# Patient Record
Sex: MALE | Race: WHITE | NOT HISPANIC OR LATINO | Employment: OTHER | URBAN - METROPOLITAN AREA
[De-identification: names, ages, dates, MRNs, and addresses within clinical notes are randomized per-mention and may not be internally consistent; named-entity substitution may affect disease eponyms.]

---

## 2017-01-17 ENCOUNTER — CLINICAL SUPPORT (OUTPATIENT)
Dept: OPHTHALMOLOGY | Facility: CLINIC | Age: 57
End: 2017-01-17
Attending: OPHTHALMOLOGY
Payer: COMMERCIAL

## 2017-01-17 DIAGNOSIS — H25.11 NUCLEAR SCLEROTIC CATARACT OF RIGHT EYE: ICD-10-CM

## 2017-01-17 DIAGNOSIS — H35.373 EPIRETINAL MEMBRANE, BOTH EYES: ICD-10-CM

## 2017-01-17 DIAGNOSIS — E11.3511 DIABETIC MACULAR EDEMA OF RIGHT EYE WITH PROLIFERATIVE RETINOPATHY ASSOCIATED WITH TYPE 2 DIABETES MELLITUS: ICD-10-CM

## 2017-01-17 DIAGNOSIS — H40.53X0: ICD-10-CM

## 2017-01-17 DIAGNOSIS — Z96.1 PSEUDOPHAKIA, LEFT EYE: ICD-10-CM

## 2017-01-17 DIAGNOSIS — H40.1112 PRIMARY OPEN ANGLE GLAUCOMA OF RIGHT EYE, MODERATE STAGE: ICD-10-CM

## 2017-01-17 DIAGNOSIS — H40.52X3 NEOVASCULAR GLAUCOMA, LEFT EYE, SEVERE STAGE: Primary | ICD-10-CM

## 2017-01-17 DIAGNOSIS — H40.52X3 NEOVASCULAR GLAUCOMA, LEFT EYE, SEVERE STAGE: ICD-10-CM

## 2017-01-17 PROCEDURE — 92012 INTRM OPH EXAM EST PATIENT: CPT | Mod: S$GLB,,, | Performed by: OPHTHALMOLOGY

## 2017-01-17 PROCEDURE — 92134 CPTRZ OPH DX IMG PST SGM RTA: CPT | Mod: S$GLB,,, | Performed by: OPHTHALMOLOGY

## 2017-01-17 PROCEDURE — 99999 PR PBB SHADOW E&M-EST. PATIENT-LVL III: CPT | Mod: PBBFAC,,, | Performed by: OPHTHALMOLOGY

## 2017-01-17 NOTE — PROGRESS NOTES
HPI     Glaucoma    Additional comments: HRT review today           Comments   DLS: 9/8/16    1. NVG OS   2. POAG OD  3. PDR with CSME OU  4. DME OD>OS  5. NS OD  6. PCIOL OS  7. ERM OS    MEDS:  Alphagan TID OU  Cosopt TID OU  Latanoprost QHS OS       Last edited by Leela Chamorro MA on 1/17/2017  2:19 PM. (History)            Assessment /Plan     For exam results, see Encounter Report.    Neovascular glaucoma, left eye, severe stage    Primary open angle glaucoma of right eye, moderate stage    Diabetic macular edema of right eye with proliferative retinopathy associated with type 2 diabetes mellitus    Neovascular glaucoma of both eyes, stage unspecified    Epiretinal membrane, both eyes    Pseudophakia, left eye    Nuclear sclerotic cataract of right eye      Pt has been followed in the retina and glaucoma clinics at [facility] for many years  Now is transferring to [facility] main campus (2016)   S/P PRP ou for PDR ou   S/P multiple avastin injections ou  + NVG os // ? POAG od   S/P ahmed os 2011  S/P phaco/IOL / baerveldt os 2012     A/P    1. NVG OS 2/2 PDR // ?? POAG od - on gtts    FH:   CCT:449 // 540 (? [facility])    Gonio: OD +3; OS sup +3/nasal PAS/temp PAS/inf small PAS   Surgeries:AGV 2011;  phaco/BV 2012 (both at [facility])    Lasers: SLT   Tmax:    Tbase (before treatment):   T range on gtts from 2014 to 2016 12-19 od // 16-25 os ([facility])    Ttarget:     C/D: 0.4//0.9    Drop intolerance: ??    APD: ?trace OS    HVF 2012, 2013, 2014, 2015 - ([facility])      [facility] - 1 test 2016 to 2016 - SAD / IAD od // gen dep - SAD / IAD OS     OCT 2015 - RNFL - dec S, bord N/I od // dec S/I, bord N os ([facility])       [facility] - 1 test 2016 to 2016  RNFL - dec. S/I od // dec. S/I, bord N   HRT 1 test 2017 to 2017: full od // dec inf os /// CDR 0.48 od // 0.69 os    T today: 14//22  Test done: HRT/Gonio       - Stopped Xalatan OD due to macular edema  - pt reports good adherence  - cont xal os, cosopt tid ou, alphagan tid  ou      2.  PDR s/p PRP OU (DM2), with CSME OU (and HTN)  - Encouraged good BS/BP/Cholesterol control    3. DME OU  OD>OS  S/p Avastin OD x 9, OS x 7  Will monitor OS for now given NVG and ERM    4. NSC OD  - Not VS    5. PSK OS  - Stable, CTM, RD precautions       6. ERM OS  Given NVG hx, monitor  - CTM      IOP OS above goal-   S/P GDD x 2 os - the ST one is retracting out of cornea - just barely in - still functioning - lrg bleb over plate   Rec. G6 CB destruction - will have baljit schedule - wants to be done after all the other eye surgeries that day - so can make the morning milk run still     Keep F/U with Mazzulla - gets avastin od q 2 months     I have seen and personally examined the patient.  I agree with the findings, assessment and plan of the resident and/or fellow.     Perla Cortés MD

## 2017-01-17 NOTE — Clinical Note
G6 CB destruction - OS - put at end of day after other cataract / glaucoma surgeries  ?? Do same day as the oterh G6 laser that got postphoned

## 2017-01-20 ENCOUNTER — PROCEDURE VISIT (OUTPATIENT)
Dept: OPHTHALMOLOGY | Facility: CLINIC | Age: 57
End: 2017-01-20
Payer: COMMERCIAL

## 2017-01-20 VITALS — SYSTOLIC BLOOD PRESSURE: 130 MMHG | HEART RATE: 73 BPM | DIASTOLIC BLOOD PRESSURE: 74 MMHG

## 2017-01-20 DIAGNOSIS — E11.3512 DIABETIC MACULAR EDEMA OF LEFT EYE WITH PROLIFERATIVE RETINOPATHY ASSOCIATED WITH TYPE 2 DIABETES MELLITUS: ICD-10-CM

## 2017-01-20 DIAGNOSIS — H35.373 EPIRETINAL MEMBRANE, BOTH EYES: ICD-10-CM

## 2017-01-20 DIAGNOSIS — E11.3511 DIABETIC MACULAR EDEMA OF RIGHT EYE WITH PROLIFERATIVE RETINOPATHY ASSOCIATED WITH TYPE 2 DIABETES MELLITUS: Primary | ICD-10-CM

## 2017-01-20 PROCEDURE — 92134 CPTRZ OPH DX IMG PST SGM RTA: CPT | Mod: S$GLB,,, | Performed by: OPHTHALMOLOGY

## 2017-01-20 PROCEDURE — 67028 INJECTION EYE DRUG: CPT | Mod: RT,S$GLB,, | Performed by: OPHTHALMOLOGY

## 2017-01-20 PROCEDURE — 92014 COMPRE OPH EXAM EST PT 1/>: CPT | Mod: 25,S$GLB,, | Performed by: OPHTHALMOLOGY

## 2017-01-20 RX ORDER — FENOFIBRATE 67 MG/1
CAPSULE ORAL
COMMUNITY
Start: 2016-10-25 | End: 2017-02-02 | Stop reason: SDUPTHER

## 2017-01-20 RX ADMIN — Medication 1.25 MG: at 10:01

## 2017-01-20 NOTE — MR AVS SNAPSHOT
[facility] - [facility]hthalmology  [address]  [facility] [address]  Phone: [phone]  Fax: [phone]                  Marvin FIGUEROA Flor   2017 9:20 AM   Procedure visit    Description:  Male : 1960   Provider:  LISA Jackamn MD   Department:  [facility]           Reason for Visit     Eye Problem           Diagnoses this Visit        Comments    Diabetic macular edema of right eye with proliferative retinopathy associated with type 2 diabetes mellitus    -  Primary     Epiretinal membrane, both eyes         Diabetic macular edema of left eye with proliferative retinopathy associated with type 2 diabetes mellitus                To Do List           Future Appointments        Provider Department Dept Phone    2/3/2017 1:30 PM Perla Cortés MD [facility] Ophthalmology [phone]      Goals (5 Years of Data)     None      Follow-Up and Disposition     Return in about 3 months (around 2017).      [facility] On Call     [facility] On Call Nurse Care Line -  Assistance  Registered nurses in the [facility] provide clinical advisement, health education, appointment booking, and other advisory services.  Call for this free service at [phone].             Medications           These medications were administered today        Dose Freq    bevacizumab (AVASTIN) 2.5 mg/0.10 mL 1.25 mg 1.25 mg Clinic/HOD 1 time    Sig: Inject 0.05 mLs (1.25 mg total) into the eye one time.    Class: Normal    Route: Intraocular           Verify that the below list of medications is an accurate representation of the medications you are currently taking.  If none reported, the list may be blank. If incorrect, please contact your healthcare provider. Carry this list with you in case of emergency.           Current Medications     allopurinol (ZYLOPRIM) 100 MG tablet Take 2 tablets (200 mg total) by mouth once daily.    amlodipine (NORVASC) 10 MG tablet Take 0.5 tablets (5  "mg total) by mouth once daily.    aspirin (ECOTRIN) 81 MG EC tablet Take 1 tablet (81 mg total) by mouth once daily.    benazepril (LOTENSIN) 40 MG tablet Take 1 tablet (40 mg total) by mouth 2 (two) times daily.    brimonidine 0.1% (ALPHAGAN P) 0.1 % Drop Place 1 drop into both eyes 3 (three) times daily.    carvedilol (COREG) 25 MG tablet Take 1 tablet (25 mg total) by mouth 2 (two) times daily.    chlorthalidone (HYGROTEN) 25 MG Tab Take 1 tablet (25 mg total) by mouth once daily.    co-enzyme Q-10 50 mg capsule Take 2 capsules (100 mg total) by mouth once daily.    dorzolamide-timolol 2-0.5% (COSOPT) 22.3-6.8 mg/mL ophthalmic solution Place 1 drop into both eyes 3 (three) times daily.    fenofibrate micronized (LOFIBRA) 67 MG capsule     fish oil-omega-3 fatty acids 300-1,000 mg capsule Take 2 capsules (2 g total) by mouth 2 (two) times daily.    insulin aspart (NOVOLOG) 100 unit/mL InPn pen Inject 20 Units into the skin 3 (three) times daily with meals.    insulin glargine (LANTUS SOLOSTAR) 100 unit/mL (3 mL) InPn pen Inject 80 Units into the skin every evening.    latanoprost 0.005 % ophthalmic solution Place 1 drop into the left eye every evening.    liraglutide 0.6 mg/0.1 mL, 18 mg/3 mL, subq PNIJ (VICTOZA 3-MEMO) 0.6 mg/0.1 mL (18 mg/3 mL) PnIj 0.6 mg per day for 5 days, then 1.2 mg daily after that    metformin (GLUCOPHAGE) 1000 MG tablet Take 1 tablet (1,000 mg total) by mouth 2 (two) times daily with meals.    MULTIVIT,THER IRON,CA,FA & MIN (MULTIVITAMIN) Tab Take 1 tablet by mouth once daily.    nitroGLYCERIN (NITROSTAT) 0.4 MG SL tablet Place 1 tablet (0.4 mg total) under the tongue every 5 (five) minutes as needed for Chest pain (If CP persists go to ER. If taking NTG notify [facility]).    pen needle, diabetic 31 gauge x 1/4" Ndle Use as directed    rosuvastatin (CRESTOR) 20 MG tablet Take 1 tablet (20 mg total) by mouth once daily.    spironolactone (ALDACTONE) 25 MG tablet Take 1 tablet (25 mg total) by " mouth once daily.           Clinical Reference Information           Vital Signs - Last Recorded  Most recent update: 1/20/2017  9:30 AM by Chanell Contreras    BP Pulse                130/74 (BP Location: Right arm, Patient Position: Sitting, BP Method: Automatic) 73          Blood Pressure          Most Recent Value    BP  130/74      Allergies as of 1/20/2017     Statins-hmg-coa Reductase Inhibitors    Penicillins      Immunizations Administered on Date of Encounter - 1/20/2017     None      Orders Placed During Today's Visit      Normal Orders This Visit    Posterior Segment OCT Retina-Both eyes     Prior Authorization Order     Future Labs/Procedures Expected by Expires    Posterior Segment OCT Retina-Both eyes  As directed 1/20/2018      [facility] Sign-Up     Activating your [facility] account is as easy as 1-2-3!     1) Visit [email or web address], select Sign Up Now, enter this activation code and your date of birth, then select Next.  [email or web address]  Expires: 3/6/2017 10:35 AM      2) Create a username and password to use when you visit [facility] in the future and select a security question in case you lose your password and select Next.    3) Enter your e-mail address and click Sign Up!    Additional Information  If you have questions, please e-mail [email or web address] or call [phone] to talk to our [facility] staff. Remember, [facility] is NOT to be used for urgent needs. For medical emergencies, dial 911.

## 2017-01-20 NOTE — PROGRESS NOTES
OCT : OD shows central DME, stable   OS ERM with DME central cyst, stable    Prior FA: shows macular leakage OU, significant NP OU, no NV OU, enlarged SURINDER OU    A/P    1. PDR s/p PRP OU (DM2), with CSME OU (and HTN)  - Encouraged good BS/BP/Cholesterol control    2. DME OU, OD>OS  - S/p Avastin OD x 12, OS x 7  - Will monitor OS for now given NVG and ERM  - Could consider Ozurdex but pt is POAG and phakic, IOP good right now)  - Avastin OD x 13    Keep 3 month maintenance      2. NVG OS   FH:   CCT:449 // 540    Gonio: OD CBB; OS sup CBB/nasal small NV at 9:00/inf PAS/temp CBB with PAS   Surgeries:;  phaco/BV 2012   Lasers: SLT   Tmax:    Tbase (before treatment):   Ttarget:     Drop intolerance:    APD:     - Stopped Xalatan OD due to macular edema  - pt reports good adherence  - No NVA OD. 1 NVA vessel at 9 oclock OS  - Cont drops as above- Rx refilled    Sees KL    3. NSC OD  - Not VS      4. PSK OS  - Stable, CTM, RD precautions       5. ERM OS>OD  - Given NVG hx, monitor  - CTM    12 weeks OCT    Risks, benefits, and alternatives to treatment discussed in detail with the patient.  The patient voiced understanding and wished to proceed with the procedure    Injection Procedure Note:  Diagnosis: DME OD    Topical Proparacaine and Betadine.  Inject Avastin OD at 6:00 @ 3.5-4mm posterior to limbus  Post Operative Dx: Same  Complications: None  Follow up as above.

## 2017-01-20 NOTE — PATIENT INSTRUCTIONS

## 2017-01-25 ENCOUNTER — LAB VISIT (OUTPATIENT)
Dept: LAB | Facility: HOSPITAL | Age: 57
End: 2017-01-25
Attending: FAMILY MEDICINE
Payer: COMMERCIAL

## 2017-01-25 ENCOUNTER — OFFICE VISIT (OUTPATIENT)
Dept: FAMILY MEDICINE | Facility: CLINIC | Age: 57
End: 2017-01-25
Payer: COMMERCIAL

## 2017-01-25 ENCOUNTER — TELEPHONE (OUTPATIENT)
Dept: OPHTHALMOLOGY | Facility: CLINIC | Age: 57
End: 2017-01-25

## 2017-01-25 VITALS
DIASTOLIC BLOOD PRESSURE: 86 MMHG | SYSTOLIC BLOOD PRESSURE: 152 MMHG | HEIGHT: 70 IN | WEIGHT: 223.75 LBS | OXYGEN SATURATION: 98 % | HEART RATE: 77 BPM | TEMPERATURE: 98 F | BODY MASS INDEX: 32.03 KG/M2

## 2017-01-25 DIAGNOSIS — K21.9 GASTROESOPHAGEAL REFLUX DISEASE, ESOPHAGITIS PRESENCE NOT SPECIFIED: Primary | ICD-10-CM

## 2017-01-25 DIAGNOSIS — H40.52X3 NEOVASCULAR GLAUCOMA OF LEFT EYE, SEVERE STAGE: Primary | ICD-10-CM

## 2017-01-25 DIAGNOSIS — Z23 IMMUNIZATION DUE: ICD-10-CM

## 2017-01-25 DIAGNOSIS — K21.9 GASTROESOPHAGEAL REFLUX DISEASE, ESOPHAGITIS PRESENCE NOT SPECIFIED: ICD-10-CM

## 2017-01-25 DIAGNOSIS — Z23 NEED FOR INFLUENZA VACCINATION: ICD-10-CM

## 2017-01-25 PROCEDURE — 90686 IIV4 VACC NO PRSV 0.5 ML IM: CPT | Mod: S$GLB,,, | Performed by: FAMILY MEDICINE

## 2017-01-25 PROCEDURE — 90471 IMMUNIZATION ADMIN: CPT | Mod: S$GLB,,, | Performed by: FAMILY MEDICINE

## 2017-01-25 PROCEDURE — 99999 PR PBB SHADOW E&M-EST. PATIENT-LVL III: CPT | Mod: PBBFAC,,, | Performed by: FAMILY MEDICINE

## 2017-01-25 PROCEDURE — 99214 OFFICE O/P EST MOD 30 MIN: CPT | Mod: 25,S$GLB,, | Performed by: FAMILY MEDICINE

## 2017-01-25 PROCEDURE — 3077F SYST BP >= 140 MM HG: CPT | Mod: S$GLB,,, | Performed by: FAMILY MEDICINE

## 2017-01-25 PROCEDURE — 86677 HELICOBACTER PYLORI ANTIBODY: CPT

## 2017-01-25 PROCEDURE — 3079F DIAST BP 80-89 MM HG: CPT | Mod: S$GLB,,, | Performed by: FAMILY MEDICINE

## 2017-01-25 PROCEDURE — 36415 COLL VENOUS BLD VENIPUNCTURE: CPT

## 2017-01-25 RX ORDER — PANTOPRAZOLE SODIUM 20 MG/1
20 TABLET, DELAYED RELEASE ORAL DAILY
Qty: 30 TABLET | Refills: 11 | Status: SHIPPED | OUTPATIENT
Start: 2017-01-25 | End: 2017-11-20

## 2017-01-25 NOTE — MR AVS SNAPSHOT
[facility]  [address]  [place] [address]  Phone: [phone]                  Marvin Mosleyue   2017 1:45 PM   Office Visit    Description:  Male : 1960   Provider:  Rubén Davis MD   Department:  [facility]           Reason for Visit     Heartburn           Diagnoses this Visit        Comments    Gastroesophageal reflux disease, esophagitis presence not specified    -  Primary            To Do List           Future Appointments        Provider Department Dept Phone    2/3/2017 1:30 PM Perla Cortés MD [facility] Ophthalmology [phone]    2017 9:10 AM LISA Jackman MD [facility] Ophthalmology [phone]      Goals (5 Years of Data)     None      Follow-Up and Disposition     Return if symptoms worsen or fail to improve.       These Medications        Disp Refills Start End    pantoprazole (PROTONIX) 20 MG tablet 30 tablet 11 2017    Take 1 tablet (20 mg total) by mouth once daily. - Oral    Pharmacy: [facility] [address] [place] [address] EXPY AT [facility] #: [phone]         [facility] On Call     [facility] On Call Nurse Care Line -  Assistance  Registered nurses in the [facility] On Call Center provide clinical advisement, health education, appointment booking, and other advisory services.  Call for this free service at [phone].             Medications           START taking these NEW medications        Refills    pantoprazole (PROTONIX) 20 MG tablet 11    Sig: Take 1 tablet (20 mg total) by mouth once daily.    Class: Normal    Route: Oral      STOP taking these medications     liraglutide 0.6 mg/0.1 mL, 18 mg/3 mL, subq PNIJ (VICTOZA 3-MEMO) 0.6 mg/0.1 mL (18 mg/3 mL) PnIj 0.6 mg per day for 5 days, then 1.2 mg daily after that           Verify that the below list of medications is an accurate representation of the medications you are currently taking.   "If none reported, the list may be blank. If incorrect, please contact your healthcare provider. Carry this list with you in case of emergency.           Current Medications     allopurinol (ZYLOPRIM) 100 MG tablet Take 2 tablets (200 mg total) by mouth once daily.    amlodipine (NORVASC) 10 MG tablet Take 0.5 tablets (5 mg total) by mouth once daily.    aspirin (ECOTRIN) 81 MG EC tablet Take 1 tablet (81 mg total) by mouth once daily.    benazepril (LOTENSIN) 40 MG tablet Take 1 tablet (40 mg total) by mouth 2 (two) times daily.    brimonidine 0.1% (ALPHAGAN P) 0.1 % Drop Place 1 drop into both eyes 3 (three) times daily.    carvedilol (COREG) 25 MG tablet Take 1 tablet (25 mg total) by mouth 2 (two) times daily.    chlorthalidone (HYGROTEN) 25 MG Tab Take 1 tablet (25 mg total) by mouth once daily.    co-enzyme Q-10 50 mg capsule Take 2 capsules (100 mg total) by mouth once daily.    dorzolamide-timolol 2-0.5% (COSOPT) 22.3-6.8 mg/mL ophthalmic solution Place 1 drop into both eyes 3 (three) times daily.    fenofibrate micronized (LOFIBRA) 67 MG capsule     fish oil-omega-3 fatty acids 300-1,000 mg capsule Take 2 capsules (2 g total) by mouth 2 (two) times daily.    insulin aspart (NOVOLOG) 100 unit/mL InPn pen Inject 20 Units into the skin 3 (three) times daily with meals.    latanoprost 0.005 % ophthalmic solution Place 1 drop into the left eye every evening.    metformin (GLUCOPHAGE) 1000 MG tablet Take 1 tablet (1,000 mg total) by mouth 2 (two) times daily with meals.    MULTIVIT,THER IRON,CA,FA & MIN (MULTIVITAMIN) Tab Take 1 tablet by mouth once daily.    pen needle, diabetic 31 gauge x 1/4" Ndle Use as directed    rosuvastatin (CRESTOR) 20 MG tablet Take 1 tablet (20 mg total) by mouth once daily.    spironolactone (ALDACTONE) 25 MG tablet Take 1 tablet (25 mg total) by mouth once daily.    insulin glargine (LANTUS SOLOSTAR) 100 unit/mL (3 mL) InPn pen Inject 80 Units into the skin every evening.    " "nitroGLYCERIN (NITROSTAT) 0.4 MG SL tablet Place 1 tablet (0.4 mg total) under the tongue every 5 (five) minutes as needed for Chest pain (If CP persists go to ER. If taking NTG notify MD.).    pantoprazole (PROTONIX) 20 MG tablet Take 1 tablet (20 mg total) by mouth once daily.           Clinical Reference Information           Vital Signs - Last Recorded  Most recent update: 1/25/2017  1:50 PM by Suzanne Ayers MA    BP Pulse Temp Ht    (!) 152/86 (BP Location: Right arm, Patient Position: Sitting, BP Method: Manual) 77 98.4 °F (36.9 °C) (Oral) 5' 10" (1.778 m)    Wt SpO2 BMI    101.5 kg (223 lb 12.3 oz) 98% 32.11 kg/m2      Blood Pressure          Most Recent Value    BP  (!)  152/86      Allergies as of 1/25/2017     Statins-hmg-coa Reductase Inhibitors    Penicillins      Immunizations Administered on Date of Encounter - 1/25/2017     None      Orders Placed During Today's Visit     Future Labs/Procedures Expected by Expires    H. PYLORI ANTIBODY, IGG  1/25/2017 3/26/2018      [facility] Sign-Up     Activating your [facility] account is as easy as 1-2-3!     1) Visit [email or web address], select Sign Up Now, enter this activation code and your date of birth, then select Next.  [email or web address]  Expires: 3/6/2017 10:35 AM      2) Create a username and password to use when you visit [facility] in the future and select a security question in case you lose your password and select Next.    3) Enter your e-mail address and click Sign Up!    Additional Information  If you have questions, please e-mail [email or web address] or call [phone] to talk to our [facility] staff. Remember, [facility] is NOT to be used for urgent needs. For medical emergencies, dial 911.         "

## 2017-01-26 LAB — H PYLORI IGG SERPL QL IA: NEGATIVE

## 2017-01-30 ENCOUNTER — TELEPHONE (OUTPATIENT)
Dept: FAMILY MEDICINE | Facility: CLINIC | Age: 57
End: 2017-01-30

## 2017-01-30 NOTE — TELEPHONE ENCOUNTER
----- Message from Rubén Davis MD sent at 1/30/2017  3:50 PM CST -----  Please let patient know that he tested negative for any bacteria in his stomach.      Thanks,  Dr. Davis

## 2017-02-03 ENCOUNTER — OFFICE VISIT (OUTPATIENT)
Dept: OPHTHALMOLOGY | Facility: CLINIC | Age: 57
End: 2017-02-03
Payer: COMMERCIAL

## 2017-02-03 DIAGNOSIS — E11.3513 PROLIFERATIVE DIABETIC RETINOPATHY OF BOTH EYES WITH MACULAR EDEMA ASSOCIATED WITH TYPE 2 DIABETES MELLITUS: ICD-10-CM

## 2017-02-03 DIAGNOSIS — H35.373 EPIRETINAL MEMBRANE, BOTH EYES: ICD-10-CM

## 2017-02-03 DIAGNOSIS — H25.11 NUCLEAR SCLEROTIC CATARACT OF RIGHT EYE: ICD-10-CM

## 2017-02-03 DIAGNOSIS — H40.1112 PRIMARY OPEN ANGLE GLAUCOMA OF RIGHT EYE, MODERATE STAGE: ICD-10-CM

## 2017-02-03 DIAGNOSIS — Z96.1 PSEUDOPHAKIA, LEFT EYE: ICD-10-CM

## 2017-02-03 DIAGNOSIS — H40.52X3 NEOVASCULAR GLAUCOMA OF LEFT EYE, SEVERE STAGE: Primary | ICD-10-CM

## 2017-02-03 PROCEDURE — 99499 UNLISTED E&M SERVICE: CPT | Mod: S$GLB,,, | Performed by: OPHTHALMOLOGY

## 2017-02-03 PROCEDURE — 99999 PR PBB SHADOW E&M-EST. PATIENT-LVL III: CPT | Mod: PBBFAC,,, | Performed by: OPHTHALMOLOGY

## 2017-02-03 RX ORDER — MOXIFLOXACIN 5 MG/ML
1 SOLUTION/ DROPS OPHTHALMIC
Status: CANCELLED | OUTPATIENT
Start: 2017-02-03

## 2017-02-03 RX ORDER — SODIUM CHLORIDE 9 MG/ML
INJECTION, SOLUTION INTRAVENOUS CONTINUOUS
Status: CANCELLED | OUTPATIENT
Start: 2017-02-03

## 2017-02-03 RX ORDER — LIDOCAINE HYDROCHLORIDE 10 MG/ML
1 INJECTION, SOLUTION EPIDURAL; INFILTRATION; INTRACAUDAL; PERINEURAL ONCE
Status: CANCELLED | OUTPATIENT
Start: 2017-02-03 | End: 2017-02-03

## 2017-02-03 NOTE — PROGRESS NOTES
HPI     DLS: 1/17/17    Pt here for Pre-Op G6 CB destruction left eye;    Meds: Alphagan tid ou             Cosopt tid ou            Latanoprost qhs os    1. Neovascular glaucoma, left eye, severe stage  2. Primary open angle glaucoma of right eye, moderate stage  3. Diabetic macular edema of right eye with proliferative retinopathy   associated with type 2 diabetes mellitus  4. Neovascular glaucoma of both eyes, stage unspecified  5. Epiretinal membrane, both eyes  6. Pseudophakia, left eye  7. Nuclear sclerotic cataract of right eye        Last edited by Brenda Don on 2/3/2017  1:42 PM.         Assessment /Plan     For exam results, see Encounter Report.    Neovascular glaucoma of left eye, severe stage    Primary open angle glaucoma of right eye, moderate stage    Epiretinal membrane, both eyes    Nuclear sclerotic cataract of right eye    Proliferative diabetic retinopathy of both eyes with macular edema associated with type 2 diabetes mellitus    Pseudophakia, left eye        Pt has been followed in the retina and glaucoma clinics at [facility] for many years  Now is transferring to [facility] main [facility] (2016)   S/P PRP ou for PDR ou   S/P multiple avastin injections ou  + NVG os // ? POAG od   S/P ahmed os 2011  S/P phaco/IOL / baerveldt os 2012     A/P    1. NVG OS 2/2 PDR // ?? POAG od - on gtts    FH:   CCT:449 // 540 (? [facility])    Gonio: OD +3; OS sup +3/nasal PAS/temp PAS/inf small PAS   Surgeries:AGV 2011;  phaco/BV 2012 (both at [facility])    Lasers: SLT   Tmax:    Tbase (before treatment):   T range on gtts from 2014 to 2016 12-19 od // 16-25 os ([facility])    Ttarget:     C/D: 0.4//0.9    Drop intolerance: ??    APD: ?trace OS    HVF 2012, 2013, 2014, 2015 - ([facility])      [facility] 2016 to 2016 - SAD / IAD od // gen dep - SAD / IAD OS     OCT 2015 - RNFL - dec S, bord N/I od // dec S/I, bord N os ([facility])       [facility] - 1 test 2016 to 2016  RNFL - dec. S/I od // dec. S/I, bord N   HRT 1 test  2017 to 2017: full od // dec inf os /// CDR 0.48 od // 0.69 os    T today: 14//22  Test done: HRT/Gonio       - Stopped Xalatan OD due to macular edema  - pt reports good adherence  - cont xal os, cosopt tid ou, alphagan tid ou      2.  PDR s/p PRP OU (DM2), with CSME OU (and HTN)  - Encouraged good BS/BP/Cholesterol control    3. DME OU  OD>OS  S/p Avastin OD x 9, OS x 7  Will monitor OS for now given NVG and ERM    4. NSC OD  - Not VS    5. PSK OS  - Stable, CTM, RD precautions       6. ERM OS  Given NVG hx, monitor  - CTM      IOP OS above goal-   S/P GDD x 2 os - the ST one is retracting out of cornea - just barely in - still functioning - lrg bleb over plate   Rec. G6 CB destruction - will have baljit schedule - wants to be done after all the other eye surgeries that day - so can make the morning milk run still     Pre-op cyclo G6 - CB destruction - OS   propofol pump     Risks and benefits discussed and consent signed.    Sees Gasper q 2 mos for antiveg therapy     I have seen and personally examined the patient.  I agree with the findings, assessment and plan of the resident and/or fellow.     Perla Cortés MD

## 2017-02-06 NOTE — PROGRESS NOTES
Routine Office Visit    Patient Name: Marvin Ray    : 1960  MRN: [id number]    Subjective:  Marvin is a 56 y.o. male who presents today for:    1. GERD  Patient presenting today for recurring GERD.  He states that it has been going on for a while.  There has been a decreased appetite as well.  He states that he normally has no trouble eating, but now feels bloated all the time.  There has been no weight loss.  There has been no changes in bowel habits.  He denies rashida diarrhea or emesis.      Past Medical History  Past Medical History   Diagnosis Date    Arthritis     Cataract     Diabetes mellitus     Diabetic retinopathy     Glaucoma     Hyperlipemia     Hypertension        Past Surgical History  Past Surgical History   Procedure Laterality Date    Tonsillectomy      Toe amputation      Cyst removal      Right foot surgery  10/2014    Cataract extraction w/  intraocular lens implant Left      WITH BAERVEDT//DONE AT [facility]    Baerveldt glaucoma implant Left      WITH CATARACT EXTRACTION//DONE AT [facility]    Ahmed glaucoma implant Left      DONE AT [facility]       Family History  Family History   Problem Relation Age of Onset    Diabetes Mother     Heart disease Brother     No Known Problems Father     No Known Problems Sister     No Known Problems Maternal Aunt     No Known Problems Maternal Uncle     No Known Problems Paternal Aunt     No Known Problems Paternal Uncle     No Known Problems Maternal Grandmother     No Known Problems Maternal Grandfather     No Known Problems Paternal Grandmother     No Known Problems Paternal Grandfather     Anemia Neg Hx     Arrhythmia Neg Hx     Asthma Neg Hx     Clotting disorder Neg Hx     Fainting Neg Hx     Heart attack Neg Hx     Heart failure Neg Hx     Hyperlipidemia Neg Hx     Hypertension Neg Hx     Stroke Neg Hx     Atrial Septal Defect Neg Hx     Amblyopia Neg Hx     Blindness Neg Hx     Glaucoma Neg Hx      Macular degeneration Neg Hx     Retinal detachment Neg Hx     Strabismus Neg Hx        Social History  Social History     Social History    Marital status: Legally      Spouse name: N/A    Number of children: N/A    Years of education: 14     Occupational History    Not on file.     Social History Main Topics    Smoking status: Former Smoker     Packs/day: 1.00     Years: 3.00     Quit date: 7/11/1984    Smokeless tobacco: Never Used    Alcohol use 0.6 oz/week     1 Cans of beer per week      Comment: Occasional    Drug use: No    Sexual activity: Not Currently     Other Topics Concern    Not on file     Social History Narrative       Current Medications  Current Outpatient Prescriptions on File Prior to Visit   Medication Sig Dispense Refill    allopurinol (ZYLOPRIM) 100 MG tablet Take 2 tablets (200 mg total) by mouth once daily. 180 tablet 3    amlodipine (NORVASC) 10 MG tablet Take 0.5 tablets (5 mg total) by mouth once daily. 30 tablet 11    aspirin (ECOTRIN) 81 MG EC tablet Take 1 tablet (81 mg total) by mouth once daily.  0    benazepril (LOTENSIN) 40 MG tablet Take 1 tablet (40 mg total) by mouth 2 (two) times daily. 180 tablet 3    brimonidine 0.1% (ALPHAGAN P) 0.1 % Drop Place 1 drop into both eyes 3 (three) times daily. 15 mL 11    carvedilol (COREG) 25 MG tablet Take 1 tablet (25 mg total) by mouth 2 (two) times daily. 60 tablet 1    chlorthalidone (HYGROTEN) 25 MG Tab Take 1 tablet (25 mg total) by mouth once daily. 30 tablet 11    co-enzyme Q-10 50 mg capsule Take 2 capsules (100 mg total) by mouth once daily. 30 capsule 11    dorzolamide-timolol 2-0.5% (COSOPT) 22.3-6.8 mg/mL ophthalmic solution Place 1 drop into both eyes 3 (three) times daily. 10 mL 11    fish oil-omega-3 fatty acids 300-1,000 mg capsule Take 2 capsules (2 g total) by mouth 2 (two) times daily. 120 capsule 5    insulin aspart (NOVOLOG) 100 unit/mL InPn pen Inject 20 Units into the skin 3 (three)  "times daily with meals. 2 Box 11    latanoprost 0.005 % ophthalmic solution Place 1 drop into the left eye every evening. 2.5 mL 11    metformin (GLUCOPHAGE) 1000 MG tablet Take 1 tablet (1,000 mg total) by mouth 2 (two) times daily with meals. 180 tablet 3    MULTIVIT,THER IRON,CA,FA & MIN (MULTIVITAMIN) Tab Take 1 tablet by mouth once daily.      pen needle, diabetic 31 gauge x 1/4" Ndle Use as directed 100 each 11    rosuvastatin (CRESTOR) 20 MG tablet Take 1 tablet (20 mg total) by mouth once daily. 90 tablet 3    spironolactone (ALDACTONE) 25 MG tablet Take 1 tablet (25 mg total) by mouth once daily. 30 tablet 11    insulin glargine (LANTUS SOLOSTAR) 100 unit/mL (3 mL) InPn pen Inject 80 Units into the skin every evening. (Patient taking differently: Inject 40 Units into the skin 2 (two) times daily. ) 3 Box 11    nitroGLYCERIN (NITROSTAT) 0.4 MG SL tablet Place 1 tablet (0.4 mg total) under the tongue every 5 (five) minutes as needed for Chest pain (If CP persists go to ER. If taking NTG notify MD.). 20 tablet 1     No current facility-administered medications on file prior to visit.        Allergies   Review of patient's allergies indicates:   Allergen Reactions    Statins-hmg-coa reductase inhibitors Other (See Comments)     Pain, Lipitor and Pravastatin    Penicillins Rash       Review of Systems (Pertinent positives)  Review of Systems   Constitutional: Negative.    HENT: Negative.    Eyes: Negative.    Respiratory: Negative.    Cardiovascular: Negative.    Gastrointestinal: Positive for abdominal pain and heartburn. Negative for blood in stool, constipation, diarrhea, melena and vomiting.   Musculoskeletal: Negative.    Skin: Negative.          Visit Vitals    BP (!) 152/86 (BP Location: Right arm, Patient Position: Sitting, BP Method: Manual)    Pulse 77    Temp 98.4 °F (36.9 °C) (Oral)    Ht 5' 10" (1.778 m)    Wt 101.5 kg (223 lb 12.3 oz)    SpO2 98%    BMI 32.11 kg/m2       GENERAL " APPEARANCE: in no apparent distress and well developed and well nourished  HEENT: PERRL, EOMI, Sclera clear, anicteric, Oropharynx clear, no lesions, Neck supple with midline trachea  NECK: normal, supple, no adenopathy, thyroid normal in size  RESPIRATORY: appears well, vitals normal, no respiratory distress, acyanotic, normal RR, chest clear, no wheezing, crepitations, rhonchi, normal symmetric air entry  HEART: regular rate and rhythm, S1, S2 normal, no murmur, click, rub or gallop.    ABDOMEN: abdomen is soft without tenderness, no masses, no hernias, no organomegaly, no rebound, no guarding. Suprapubic tenderness absent. No CVA tenderness.  SKIN: no rashes, no wounds, no other lesions  PSYCH: Alert, oriented x 3, thought content appropriate, speech normal, pleasant and cooperative, good eye contact, well groomed    Assessment/Plan:  Marvin Ray is a 56 y.o. male who presents today for :    Marvin was seen today for heartburn.    Diagnoses and all orders for this visit:    Gastroesophageal reflux disease, esophagitis presence not specified  -     pantoprazole (PROTONIX) 20 MG tablet; Take 1 tablet (20 mg total) by mouth once daily.  -     H. PYLORI ANTIBODY, IGG; Future    Immunization due  -     Influenza - Quadrivalent (3 years & older) (PF)    Need for influenza vaccination      1.  Will try protonix  2.  H.pylori ordered  3.  He is to follow up if no improvement  4.  Will consider EGD if no improvement  5.  Patient to call with any concerns  6.  Flu shot given today    Rubén Davis MD

## 2017-02-14 RX ORDER — EPINEPHRINE 0.22MG
100 AEROSOL WITH ADAPTER (ML) INHALATION EVERY MORNING
COMMUNITY

## 2017-02-14 NOTE — PRE-PROCEDURE INSTRUCTIONS
Spoke with Patient.  NPO, medication, and pre-op instructions reviewed.  Denies previous problems with Anesthesia.  He is not sure if he is taking 2 of the medicines on his list.  He is not at home to check.  Stated that he does not check his glucose daily.  Verbalized understanding of instructions.

## 2017-02-15 ENCOUNTER — HOSPITAL ENCOUNTER (OUTPATIENT)
Facility: HOSPITAL | Age: 57
Discharge: HOME | End: 2017-02-15
Attending: OPHTHALMOLOGY | Admitting: OPHTHALMOLOGY
Payer: COMMERCIAL

## 2017-02-15 ENCOUNTER — SURGERY (OUTPATIENT)
Age: 57
End: 2017-02-15

## 2017-02-15 ENCOUNTER — ANESTHESIA EVENT (OUTPATIENT)
Dept: SURGERY | Facility: HOSPITAL | Age: 57
End: 2017-02-15
Payer: COMMERCIAL

## 2017-02-15 ENCOUNTER — ANESTHESIA (OUTPATIENT)
Dept: SURGERY | Facility: HOSPITAL | Age: 57
End: 2017-02-15
Payer: COMMERCIAL

## 2017-02-15 VITALS
SYSTOLIC BLOOD PRESSURE: 125 MMHG | HEART RATE: 66 BPM | OXYGEN SATURATION: 100 % | HEIGHT: 71 IN | BODY MASS INDEX: 30.8 KG/M2 | WEIGHT: 220 LBS | TEMPERATURE: 98 F | RESPIRATION RATE: 16 BRPM | DIASTOLIC BLOOD PRESSURE: 68 MMHG

## 2017-02-15 DIAGNOSIS — H40.52X3 NEOVASCULAR GLAUCOMA OF LEFT EYE, SEVERE STAGE: Primary | ICD-10-CM

## 2017-02-15 LAB
POCT GLUCOSE: 221 MG/DL (ref 70–110)
POCT GLUCOSE: 254 MG/DL (ref 70–110)

## 2017-02-15 PROCEDURE — 99499 UNLISTED E&M SERVICE: CPT | Mod: ,,, | Performed by: OPHTHALMOLOGY

## 2017-02-15 PROCEDURE — 66710 CILIARY TRANSSLERAL THERAPY: CPT | Mod: LT,,, | Performed by: OPHTHALMOLOGY

## 2017-02-15 PROCEDURE — D9220A PRA ANESTHESIA: Mod: CRNA,,, | Performed by: NURSE ANESTHETIST, CERTIFIED REGISTERED

## 2017-02-15 PROCEDURE — 36000706: Performed by: OPHTHALMOLOGY

## 2017-02-15 PROCEDURE — 71000015 HC POSTOP RECOV 1ST HR: Performed by: OPHTHALMOLOGY

## 2017-02-15 PROCEDURE — D9220A PRA ANESTHESIA: Mod: ANES,,, | Performed by: ANESTHESIOLOGY

## 2017-02-15 PROCEDURE — 25000003 PHARM REV CODE 250

## 2017-02-15 PROCEDURE — 37000008 HC ANESTHESIA 1ST 15 MINUTES: Performed by: OPHTHALMOLOGY

## 2017-02-15 PROCEDURE — 25000003 PHARM REV CODE 250: Performed by: NURSE ANESTHETIST, CERTIFIED REGISTERED

## 2017-02-15 PROCEDURE — 63600175 PHARM REV CODE 636 W HCPCS: Performed by: NURSE ANESTHETIST, CERTIFIED REGISTERED

## 2017-02-15 PROCEDURE — 25000003 PHARM REV CODE 250: Performed by: OPHTHALMOLOGY

## 2017-02-15 RX ORDER — LIDOCAINE HCL/PF 100 MG/5ML
SYRINGE (ML) INTRAVENOUS
Status: DISCONTINUED | OUTPATIENT
Start: 2017-02-15 | End: 2017-02-15

## 2017-02-15 RX ORDER — PREDNISOLONE ACETATE 10 MG/ML
SUSPENSION/ DROPS OPHTHALMIC
Status: DISCONTINUED
Start: 2017-02-15 | End: 2017-02-15 | Stop reason: HOSPADM

## 2017-02-15 RX ORDER — LIDOCAINE HYDROCHLORIDE 10 MG/ML
1 INJECTION, SOLUTION EPIDURAL; INFILTRATION; INTRACAUDAL; PERINEURAL ONCE
Status: DISCONTINUED | OUTPATIENT
Start: 2017-02-15 | End: 2017-02-15 | Stop reason: HOSPADM

## 2017-02-15 RX ORDER — MOXIFLOXACIN 5 MG/ML
SOLUTION/ DROPS OPHTHALMIC
Status: COMPLETED
Start: 2017-02-15 | End: 2017-02-15

## 2017-02-15 RX ORDER — PROPOFOL 10 MG/ML
VIAL (ML) INTRAVENOUS CONTINUOUS PRN
Status: DISCONTINUED | OUTPATIENT
Start: 2017-02-15 | End: 2017-02-15

## 2017-02-15 RX ORDER — LIDOCAINE HYDROCHLORIDE 20 MG/ML
JELLY TOPICAL
Status: DISCONTINUED
Start: 2017-02-15 | End: 2017-02-15 | Stop reason: HOSPADM

## 2017-02-15 RX ORDER — ATROPINE SULFATE 10 MG/ML
SOLUTION/ DROPS OPHTHALMIC
Status: DISCONTINUED
Start: 2017-02-15 | End: 2017-02-15 | Stop reason: HOSPADM

## 2017-02-15 RX ORDER — MOXIFLOXACIN 5 MG/ML
1 SOLUTION/ DROPS OPHTHALMIC
Status: DISCONTINUED | OUTPATIENT
Start: 2017-02-15 | End: 2017-02-15 | Stop reason: HOSPADM

## 2017-02-15 RX ORDER — MIDAZOLAM HYDROCHLORIDE 1 MG/ML
INJECTION, SOLUTION INTRAMUSCULAR; INTRAVENOUS
Status: DISCONTINUED | OUTPATIENT
Start: 2017-02-15 | End: 2017-02-15

## 2017-02-15 RX ORDER — SODIUM CHLORIDE 9 MG/ML
INJECTION, SOLUTION INTRAVENOUS CONTINUOUS
Status: DISCONTINUED | OUTPATIENT
Start: 2017-02-15 | End: 2017-02-15 | Stop reason: HOSPADM

## 2017-02-15 RX ORDER — PROPOFOL 10 MG/ML
VIAL (ML) INTRAVENOUS
Status: DISCONTINUED | OUTPATIENT
Start: 2017-02-15 | End: 2017-02-15

## 2017-02-15 RX ORDER — PREDNISOLONE ACETATE 10 MG/ML
SUSPENSION/ DROPS OPHTHALMIC
Status: DISCONTINUED | OUTPATIENT
Start: 2017-02-15 | End: 2017-02-15 | Stop reason: HOSPADM

## 2017-02-15 RX ORDER — ATROPINE SULFATE 10 MG/ML
SOLUTION/ DROPS OPHTHALMIC
Status: DISCONTINUED | OUTPATIENT
Start: 2017-02-15 | End: 2017-02-15 | Stop reason: HOSPADM

## 2017-02-15 RX ADMIN — MOXIFLOXACIN 1 DROP: 5 SOLUTION/ DROPS OPHTHALMIC at 11:02

## 2017-02-15 RX ADMIN — PROPOFOL 150 MCG/KG/MIN: 10 INJECTION, EMULSION INTRAVENOUS at 11:02

## 2017-02-15 RX ADMIN — MIDAZOLAM HYDROCHLORIDE 2 MG: 1 INJECTION, SOLUTION INTRAMUSCULAR; INTRAVENOUS at 11:02

## 2017-02-15 RX ADMIN — PROPOFOL 50 MG: 10 INJECTION, EMULSION INTRAVENOUS at 11:02

## 2017-02-15 RX ADMIN — LIDOCAINE HYDROCHLORIDE 20 MG: 20 INJECTION, SOLUTION INTRAVENOUS at 11:02

## 2017-02-15 RX ADMIN — SODIUM CHLORIDE: 0.9 INJECTION, SOLUTION INTRAVENOUS at 11:02

## 2017-02-15 RX ADMIN — ATROPINE SULFATE 1 DROP: 10 SOLUTION OPHTHALMIC at 11:02

## 2017-02-15 RX ADMIN — MOXIFLOXACIN HYDROCHLORIDE 1 DROP: 5 SOLUTION/ DROPS OPHTHALMIC at 11:02

## 2017-02-15 RX ADMIN — PREDNISOLONE ACETATE 1 DROP: 10 SUSPENSION/ DROPS OPHTHALMIC at 11:02

## 2017-02-15 NOTE — OP NOTE
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 Neovascular glaucoma of left eye, severe stage    PROCEDURE PERFORMED: ciliary body destruction with G6 laser  OS    ATTENDING SURGEON: Perla Cortés M.D.     ASSISTANT: Carly Hernandez MD      ANESTHESIA:  Local MAC    COMPLICATIONS: None.     BLOOD LOSS: Less than 5 mL     PROCEDURE IN DETAIL: After informed consent including the risks, benefits and   alternatives, the patient was brought to the Operating Room table. Monitored   anesthesia care was used throughout the entire case without any complications.     A lid speculum was inserted. The ciliary body was treated trans scleral fashion.   The laser G6 was used. The inferior half was treated with 9 sweeps of 10 seconds each.   After the inferior half was treated the same thing was done to the superior half of the eye. Once again doing 9 sweeps of 10 seconds each .  Atropine drops were given and a collagen shield soaked in pred acetate was placed. A patch was placed after that. The patient left the operating room for the folding area in good condition.

## 2017-02-15 NOTE — ANESTHESIA POSTPROCEDURE EVALUATION
"Anesthesia Post Evaluation    Patient: Marvin Ray    Procedure(s) Performed: Procedure(s) (LRB):  TRABECULECTOMY/G 6 LASER (Left)    Final Anesthesia Type: MAC  Patient location during evaluation: PACU  Patient participation: Yes- Able to Participate  Level of consciousness: awake and alert  Post-procedure vital signs: reviewed and stable  Pain management: adequate  Airway patency: patent  PONV status at discharge: No PONV  Anesthetic complications: no      Cardiovascular status: stable  Respiratory status: unassisted and spontaneous ventilation  Hydration status: euvolemic  Follow-up not needed.        Visit Vitals    /68    Pulse 66    Temp 36.6 °C (97.9 °F) (Temporal)    Resp 16    Ht 5' 10.5" (1.791 m)    Wt 99.8 kg (220 lb)    SpO2 100%    BMI 31.12 kg/m2       Pain/Ralf Score: Presence of Pain: non-verbal indicators absent (2/15/2017 12:11 PM)  Ralf Score: 7 (2/15/2017 12:11 PM)      "

## 2017-02-15 NOTE — DISCHARGE SUMMARY
Date of Procedure / Discharge: 02/15/2017     PreOp Diagnosis: Uncontrolled Glaucoma OS     PostOp Diagnosis:as above s/p procedure    Procedure:ciliary body destruction with G6 laser  OS   Attending:Perla Cortés MD    Assistant Carly Hernandez MD     Anesthesia:Local MAC    Complications::None    Blood loss: <5 CC    Specimens: none    Procedure Details:  See Dictation      -D/C home when patient meets anesthesia requirements  -Follow up tomorrow with surgeon in the [facility].

## 2017-02-15 NOTE — ANESTHESIA PREPROCEDURE EVALUATION
02/15/2017  Marvin Ray is a 56 y.o., male.    [facility] Anesthesia Evaluation    I have reviewed the Patient Summary Reports.    I have reviewed the Nursing Notes.   I have reviewed the Medications.     Review of Systems  Anesthesia Hx:  No problems with previous Anesthesia  History of prior surgery of interest to airway management or planning: Denies Family Hx of Anesthesia complications.   Denies Personal Hx of Anesthesia complications.   Cardiovascular:   Hypertension CAD   ECG has been reviewed.    Pulmonary:  Pulmonary Normal    Renal/:  Renal/ Normal     Hepatic/GI:  Hepatic/GI Normal    Musculoskeletal:   Arthritis     Neurological:  Neurology Normal    Endocrine:   Diabetes        Physical Exam  General:  Obesity    Airway/Jaw/Neck:  Airway Findings: Mouth Opening: Normal Tongue: Normal  General Airway Assessment: Adult  Mallampati: II  Improves to I with phonation.  TM Distance: Normal, at least 6 cm  Jaw/Neck Findings:  Micrognathia: Negative Neck ROM: Normal ROM      Dental:  Dental Findings:    Chest/Lungs:  Chest/Lungs Findings: Clear to auscultation, Normal Respiratory Rate     Heart/Vascular:  Heart Findings: Rate: Normal  Rhythm: Regular Rhythm  Sounds: Normal  Heart murmur: negative    Abdomen:  Abdomen Findings:  Nontender, Soft  protuberant       Mental Status:  Mental Status Findings:  Cooperative, Alert and Oriented         Anesthesia Plan  Type of Anesthesia, risks & benefits discussed:  Anesthesia Type:  MAC, general  Patient's Preference:   Intra-op Monitoring Plan:   Intra-op Monitoring Plan Comments:   Post Op Pain Control Plan:   Post Op Pain Control Plan Comments:   Induction:   IV  Beta Blocker:  Patient is on a Beta-Blocker and has received one dose within the past 24 hours (No further documentation required).       Informed Consent: Patient understands risks and agrees  with Anesthesia plan.  Questions answered. Anesthesia consent signed with patient.  ASA Score: 2     Day of Surgery Review of History & Physical:    H&P update referred to the surgeon.         Ready For Surgery From Anesthesia Perspective.

## 2017-02-15 NOTE — TRANSFER OF CARE
"Anesthesia Transfer of Care Note    Patient: Marvin Ray    Procedure(s) Performed: Procedure(s) (LRB):  TRABECULECTOMY/G 6 LASER (Left)    Patient location: PACU    Anesthesia Type: general    Transport from OR: Transported from OR on 2-3 L/min O2 by NC with adequate spontaneous ventilation    Post pain: adequate analgesia    Post assessment: no apparent anesthetic complications    Post vital signs: stable    Level of consciousness: awake, oriented and alert    Nausea/Vomiting: no nausea/vomiting    Complications: none          Last vitals:   Visit Vitals    BP (!) 186/97 (BP Location: Right arm, Patient Position: Lying, BP Method: Automatic)    Pulse 92    Temp 36.8 °C (98.3 °F) (Oral)    Resp 18    Ht 5' 10.5" (1.791 m)    Wt 99.8 kg (220 lb)    SpO2 95%    BMI 31.12 kg/m2     "

## 2017-02-15 NOTE — PLAN OF CARE
Discharge instructions reviewed w/ pt and family, vebalized understanding. Pt in NADN. No complaints at this time. Tolerated liquids w/ no issues. To be d/c'd home w/ family.

## 2017-02-15 NOTE — DISCHARGE INSTRUCTIONS
Having a Trabeculectomy  Trabeculectomy is a type of eye surgery done in the front part of the eye. Its done to treat glaucoma by draining some fluid from the eye. If you have glaucoma, fluid can drain too slowly. This can cause the pressure in your eye to increase. This then increases pressure on the optic nerve. This surgery helps to lower pressure inside the eye.  What to tell your health care provider  Before your surgery, tell your health care provider:  · What medicines you take. This includes over-the-counter medicines such as ibuprofen. It also includes vitamins, herbs, and other supplements. You may need to stop taking some medicines before the procedure, such as blood thinners and aspirin.  · If you smoke. You may need to stop before your surgery. Smoking can delay healing. Talk with your health care provider if you need help to stop smoking.  · If youve had recent changes in your health. This includes an infection or fever.  · If you are sensitive or allergic to anything. This includes medicines, latex, tape, and anesthetic medicines.  · If you are pregnant. Also tell your health care provider if you think you may be pregnant.  Getting ready for your surgery  Make sure to:  · Ask a family member or friend to take you home from the hospital  · Make plans for some help at home while you recover  · Follow all other instructions from your health care provider  · Read the consent form and ask questions if something is not clear  · Not eat or drink after midnight before your surgery  Tests before your surgery  Before your surgery, you may have tests to look at your eye. These may include:  · Dilated eye exam  · Tonometry, to measure the pressure inside the eye  · Pachymetry, to measure the thickness of the cornea  On the day of your surgery  Talk with your doctor about what to expect during your surgery. The details of the surgery may differ somewhat. A doctor specially trained in eye surgery  (ophthalmologist) will likely do the procedure. In general, you can expect the following:  · You may have general anesthesia. This will cause you to sleep through the surgery. Or you may be awake during the surgery. You will receive a medicine to help you relax. You also may be given anesthetic eye drops and injections. This is to make sure you dont feel anything.  · Antifibrotic medicine may be put on your eye. This medicine can help reduce scarring and other problems.  · Your surgeon may rotate the eye and secure it in place with a single stitch.  · The surgeon will make an incision along the conjunctiva on the side of your eye. He or she will then make an incision partway through your sclera, making a flap. The incision will go all the way into the area that contains the fluid. A small piece of tissue will be removed. The surgeon will then make a small hole in your iris. This will let the fluid drain.  · Your surgeon will then close the area and remove the stitch that held your eye in place.  · The surgeon may put antibiotics in your eye. This is to help prevent infection.  · Your eye will be covered and taped shut.  After your surgery  You may be able to go home the same day. Have someone drive you home.  Recovering at home  Follow all of your doctors instructions about caring for your eye. Your eye may be a little sore after the procedure. You can take over-the-counter pain medicine as approved by your health care provider. You may need to take antibiotics to help prevent infection. You may also need other medicines, such as steroids or antifibrotic medicine. You may need to keep your eye covered for a while after surgery. Ask your doctor if you should avoid certain activities while you recover.  Follow-up care  You will need close follow-up with your doctor to see if how well the surgery worked. You may have an appointment the day after the surgery. Your doctor will make sure the new opening is working well  and that fluid is draining.  You will need continued follow-up care to check your progress. You may need to have stitches in your eye removed in a follow-up appointment a few weeks after your surgery.     When to call your health care provider  Call your health care provider right away if you have any of the below:  · Bleeding  · Fever of 100.4°F (38.0°C) or higher  · Vision that gets worse  · Pain or swelling in your eye that gets worse   Date Last Reviewed: 6/16/2015  © 6687-8265 [employer]. [address] [place] [id number]. All rights reserved. This information is not intended as a substitute for professional medical care. Always follow your healthcare professional's instructions.

## 2017-02-15 NOTE — IP AVS SNAPSHOT
[facility]  [address]  [facility] [address]  Phone: [phone]           Patient Discharge Instructions     Our goal is to set you up for success. This packet includes information on your condition, medications, and your home care. It will help you to care for yourself so you don't get sicker and need to go back to the hospital.     Please ask your nurse if you have any questions.        There are many details to remember when preparing to leave the hospital. Here is what you will need to do:    1. Take your medicine. If you are prescribed medications, review your Medication List in the following pages. You may have new medications to  at the pharmacy and others that you'll need to stop taking. Review the instructions for how and when to take your medications. Talk with your doctor or nurses if you are unsure of what to do.     2. Go to your follow-up appointments. Specific follow-up information is listed in the following pages. Your may be contacted by a transition nurse or clinical provider about future appointments. Be sure we have all of the phone numbers to reach you, if needed. Please contact your provider's office if you are unable to make an appointment.     3. Watch for warning signs. Your doctor or nurse will give you detailed warning signs to watch for and when to call for assistance. These instructions may also include educational information about your condition. If you experience any of warning signs to your health, call your doctor.               [facility]  Unless otherwise directed by your provider, please contact [facility], our nurse care line that is available for 24/7 assistance.     [phone] (toll-free)    Registered nurses in the [facility] provide clinical advisement, health education, appointment booking, and other advisory services.                    ** Verify the list of medication(s) below is accurate and up  to date. Carry this with you in case of emergency. If your medications have changed, please notify your healthcare provider.             Medication List      CHANGE how you take these medications        Additional Info                      allopurinol 100 MG tablet   Commonly known as:  ZYLOPRIM   Quantity:  180 tablet   Refills:  3   Dose:  200 mg   What changed:  when to take this    Instructions:  Take 2 tablets (200 mg total) by mouth once daily.     Begin Date    AM    Noon    PM    Bedtime       aspirin 81 MG EC tablet   Commonly known as:  ECOTRIN   Refills:  0   Dose:  81 mg   What changed:  when to take this    Instructions:  Take 1 tablet (81 mg total) by mouth once daily.     Begin Date    AM    Noon    PM    Bedtime       fish oil-omega-3 fatty acids 300-1,000 mg capsule   Quantity:  120 capsule   Refills:  5   Dose:  2 g   What changed:  how much to take    Instructions:  Take 2 capsules (2 g total) by mouth 2 (two) times daily.     Begin Date    AM    Noon    PM    Bedtime       insulin aspart 100 unit/mL Inpn pen   Commonly known as:  NovoLOG   Quantity:  2 Box   Refills:  11   Dose:  20 Units   What changed:  additional instructions    Instructions:  Inject 20 Units into the skin 3 (three) times daily with meals.     Begin Date    AM    Noon    PM    Bedtime       insulin glargine 100 unit/mL (3 mL) Inpn pen   Commonly known as:  LANTUS SOLOSTAR   Quantity:  3 Box   Refills:  11   Dose:  80 Units   What changed:    - how much to take  - when to take this    Instructions:  Inject 80 Units into the skin every evening.     Begin Date    AM    Noon    PM    Bedtime       latanoprost 0.005 % ophthalmic solution   Quantity:  2.5 mL   Refills:  11   Dose:  1 drop   What changed:  when to take this    Instructions:  Place 1 drop into the left eye every evening.     Begin Date    AM    Noon    PM    Bedtime       pantoprazole 20 MG tablet   Commonly known as:  PROTONIX   Quantity:  30 tablet   Refills:  11    Dose:  20 mg   What changed:  when to take this    Instructions:  Take 1 tablet (20 mg total) by mouth once daily.     Begin Date    AM    Noon    PM    Bedtime       rosuvastatin 20 MG tablet   Commonly known as:  CRESTOR   Quantity:  90 tablet   Refills:  3   Dose:  20 mg   What changed:    - when to take this  - additional instructions    Instructions:  Take 1 tablet (20 mg total) by mouth once daily.     Begin Date    AM    Noon    PM    Bedtime       spironolactone 25 MG tablet   Commonly known as:  ALDACTONE   Quantity:  30 tablet   Refills:  11   Dose:  25 mg   What changed:  when to take this    Instructions:  Take 1 tablet (25 mg total) by mouth once daily.     Begin Date    AM    Noon    PM    Bedtime         CONTINUE taking these medications        Additional Info                      amlodipine 10 MG tablet   Commonly known as:  NORVASC   Quantity:  30 tablet   Refills:  11   Dose:  5 mg    Instructions:  Take 0.5 tablets (5 mg total) by mouth once daily.     Begin Date    AM    Noon    PM    Bedtime       benazepril 40 MG tablet   Commonly known as:  LOTENSIN   Quantity:  180 tablet   Refills:  3   Dose:  40 mg    Instructions:  Take 1 tablet (40 mg total) by mouth 2 (two) times daily.     Begin Date    AM    Noon    PM    Bedtime       brimonidine 0.1% 0.1 % Drop   Commonly known as:  ALPHAGAN P   Quantity:  15 mL   Refills:  11   Dose:  1 drop    Instructions:  Place 1 drop into both eyes 3 (three) times daily.     Begin Date    AM    Noon    PM    Bedtime       carvedilol 25 MG tablet   Commonly known as:  COREG   Quantity:  60 tablet   Refills:  1   Dose:  25 mg    Instructions:  Take 1 tablet (25 mg total) by mouth 2 (two) times daily.     Begin Date    AM    Noon    PM    Bedtime       chlorthalidone 25 MG Tab   Commonly known as:  HYGROTEN   Quantity:  30 tablet   Refills:  11   Dose:  25 mg    Instructions:  Take 1 tablet (25 mg total) by mouth once daily.     Begin Date    AM    Noon    PM     "Bedtime       coenzyme Q10 100 mg capsule   Refills:  0   Dose:  100 mg    Instructions:  Take 100 mg by mouth every morning.     Begin Date    AM    Noon    PM    Bedtime       dorzolamide-timolol 2-0.5% 22.3-6.8 mg/mL ophthalmic solution   Commonly known as:  COSOPT   Quantity:  10 mL   Refills:  11   Dose:  1 drop    Instructions:  Place 1 drop into both eyes 3 (three) times daily.     Begin Date    AM    Noon    PM    Bedtime       fenofibrate micronized 67 MG capsule   Commonly known as:  LOFIBRA   Quantity:  90 capsule   Refills:  3   Dose:  67 mg    Instructions:  Take 1 capsule (67 mg total) by mouth before breakfast.     Begin Date    AM    Noon    PM    Bedtime       metformin 1000 MG tablet   Commonly known as:  GLUCOPHAGE   Quantity:  180 tablet   Refills:  3   Dose:  1000 mg    Instructions:  Take 1 tablet (1,000 mg total) by mouth 2 (two) times daily with meals.     Begin Date    AM    Noon    PM    Bedtime       multivitamin Tab   Refills:  0   Dose:  1 tablet    Instructions:  Take 1 tablet by mouth every morning.     Begin Date    AM    Noon    PM    Bedtime       nitroGLYCERIN 0.4 MG SL tablet   Commonly known as:  NITROSTAT   Quantity:  20 tablet   Refills:  1   Dose:  0.4 mg    Instructions:  Place 1 tablet (0.4 mg total) under the tongue every 5 (five) minutes as needed for Chest pain (If CP persists go to ER. If taking NTG notify MD.).     Begin Date    AM    Noon    PM    Bedtime       pen needle, diabetic 31 gauge x 1/4" Ndle   Quantity:  100 each   Refills:  11    Instructions:  Use as directed     Begin Date    AM    Noon    PM    Bedtime                  Please bring to all follow up appointments:    1. A copy of your discharge instructions.  2. All medicines you are currently taking in their original bottles.  3. Identification and insurance card.    Please arrive 15 minutes ahead of scheduled appointment time.    Please call 24 hours in advance if you must reschedule your appointment " and/or time.        Your Scheduled Appointments     Feb 16, 2017  8:30 AM CST   Post OP with Perla Cortés MD   [facility] ([facility] )    [address]  [place] [address] [phone]            Apr 21, 2017  9:10 AM CDT   Procedure with LISA Jackman MD   [facility] ([facility] )    [address]  [place] [address] [phone]              Your Future Surgeries/Procedures     Feb 15, 2017   Surgery with Perla Cortés MD   [facility] ([facility])    [address] [facility] [address] [phone]              Follow-up Information     Follow up with Perla Cortés MD In 1 day.    Specialty:  Ophthalmology    Contact information:    [address] [place] [address] [id number] [phone]          Discharge Instructions     Future Orders    Activity as tolerated     Diet general     Questions:    Total calories:      Fat restriction, if any:      Protein restriction, if any:      Na restriction, if any:      Fluid restriction:      Additional restrictions:      Leave dressing on - Keep it clean, dry, and intact until clinic visit         Discharge Instructions         Having a Trabeculectomy  Trabeculectomy is a type of eye surgery done in the front part of the eye. Its done to treat glaucoma by draining some fluid from the eye. If you have glaucoma, fluid can drain too slowly. This can cause the pressure in your eye to increase. This then increases pressure on the optic nerve. This surgery helps to lower pressure inside the eye.  What to tell your health care provider  Before your surgery, tell your health care provider:  · What medicines you take. This includes over-the-counter medicines such as ibuprofen. It also includes vitamins, herbs, and other supplements. You may need to stop taking some medicines before the procedure, such as blood thinners and aspirin.  · If you smoke.  You may need to stop before your surgery. Smoking can delay healing. Talk with your health care provider if you need help to stop smoking.  · If youve had recent changes in your health. This includes an infection or fever.  · If you are sensitive or allergic to anything. This includes medicines, latex, tape, and anesthetic medicines.  · If you are pregnant. Also tell your health care provider if you think you may be pregnant.  Getting ready for your surgery  Make sure to:  · Ask a family member or friend to take you home from the hospital  · Make plans for some help at home while you recover  · Follow all other instructions from your health care provider  · Read the consent form and ask questions if something is not clear  · Not eat or drink after midnight before your surgery  Tests before your surgery  Before your surgery, you may have tests to look at your eye. These may include:  · Dilated eye exam  · Tonometry, to measure the pressure inside the eye  · Pachymetry, to measure the thickness of the cornea  On the day of your surgery  Talk with your doctor about what to expect during your surgery. The details of the surgery may differ somewhat. A doctor specially trained in eye surgery (ophthalmologist) will likely do the procedure. In general, you can expect the following:  · You may have general anesthesia. This will cause you to sleep through the surgery. Or you may be awake during the surgery. You will receive a medicine to help you relax. You also may be given anesthetic eye drops and injections. This is to make sure you dont feel anything.  · Antifibrotic medicine may be put on your eye. This medicine can help reduce scarring and other problems.  · Your surgeon may rotate the eye and secure it in place with a single stitch.  · The surgeon will make an incision along the conjunctiva on the side of your eye. He or she will then make an incision partway through your sclera, making a flap. The incision will go  all the way into the area that contains the fluid. A small piece of tissue will be removed. The surgeon will then make a small hole in your iris. This will let the fluid drain.  · Your surgeon will then close the area and remove the stitch that held your eye in place.  · The surgeon may put antibiotics in your eye. This is to help prevent infection.  · Your eye will be covered and taped shut.  After your surgery  You may be able to go home the same day. Have someone drive you home.  Recovering at home  Follow all of your doctors instructions about caring for your eye. Your eye may be a little sore after the procedure. You can take over-the-counter pain medicine as approved by your health care provider. You may need to take antibiotics to help prevent infection. You may also need other medicines, such as steroids or antifibrotic medicine. You may need to keep your eye covered for a while after surgery. Ask your doctor if you should avoid certain activities while you recover.  Follow-up care  You will need close follow-up with your doctor to see if how well the surgery worked. You may have an appointment the day after the surgery. Your doctor will make sure the new opening is working well and that fluid is draining.  You will need continued follow-up care to check your progress. You may need to have stitches in your eye removed in a follow-up appointment a few weeks after your surgery.     When to call your health care provider  Call your health care provider right away if you have any of the below:  · Bleeding  · Fever of 100.4°F (38.0°C) or higher  · Vision that gets worse  · Pain or swelling in your eye that gets worse   Date Last Reviewed: 6/16/2015  © 9737-4848 [employer]. [address] [place] [address]. All rights reserved. This information is not intended as a substitute for professional medical care. Always follow your healthcare professional's instructions.            Primary  "Diagnosis     Your primary diagnosis was:  Glaucoma (Increased Eye Pressure)      Admission Information     Date & Time Provider Department CSN    2/15/2017  9:29 AM Perla Cortés MD [facility] [id number]      Care Providers     Provider Role Specialty Primary office phone    Perla Cortés MD Attending Provider Ophthalmology [phone]    Perla Cortés MD Surgeon  Ophthalmology [phone]      Your Vitals Were     BP Pulse Temp Resp Height Weight    125/63 (BP Location: Right arm, Patient Position: Lying, BP Method: Automatic) 76 97.9 °F (36.6 °C) (Temporal) 16 5' 10.5" (1.791 m) 99.8 kg (220 lb)    SpO2 BMI             100% 31.12 kg/m2         Recent Lab Values        7/15/2015 10/15/2015 1/8/2016 5/6/2016 8/25/2016               9:22 AM  9:34 AM  9:52 AM  5:48 AM 12:40 PM       A1C 8.2 (H) 9.3 (H) 8.0 (H) 8.7 (H) 9.0 (H)       Comment for A1C at 12:40 PM on 8/25/2016:  According to ADA guidelines, hemoglobin A1C <7.0% represents  optimal control in non-pregnant diabetic patients.  Different  metrics may apply to specific populations.   Standards of Medical Care in Diabetes - 2016.  For the purpose of screening for the presence of diabetes:  <5.7%     Consistent with the absence of diabetes  5.7-6.4%  Consistent with increasing risk for diabetes   (prediabetes)  >or=6.5%  Consistent with diabetes  Currently no consensus exists for use of hemoglobin A1C  for diagnosis of diabetes for children.        Allergies as of 2/15/2017        Reactions    Statins-hmg-coa Reductase Inhibitors Other (See Comments)    Generalized Pain    Penicillins Rash      Advance Directives     An advance directive is a document which, in the event you are no longer able to make decisions for yourself, tells your healthcare team what kind of treatment you do or do not want to receive, or who you would like to make those decisions for you.  If you do not currently have an advance directive, [facility] " encourages you to create one.  For more information call:  [phone]), [phone] ([phone]),  or log on to [email or web address].        Smoking Cessation     If you would like to quit smoking:   You may be eligible for free services if you are a [place] resident and started smoking cigarettes before September 1, 1988.  Call the Smoking Cessation Trust (SCT) toll free at (612) 080-8384 or (780) 800-3818.   Call 2-680-QUIT-NOW if you do not meet the above criteria.            Language Assistance Services     ATTENTION: Language assistance services are available, free of charge. Please call [phone].      ATENCIÓN: Si ken coon, tiene a quinonez disposición servicios gratuitos de asistencia lingüística. Llame al [phone].     CHÚ Ý: N?u b?n nói Ti?ng Vi?t, có các d?ch v? h? tr? ngôn ng? mi?n phí dành cho b?n. G?i s? [phone].        Diabetes Discharge Instructions                                   [facility] Sign-Up     Activating your [facility] account is as easy as 1-2-3!     1) Visit [email or web address], select Sign Up Now, enter this activation code and your date of birth, then select Next.  [email or web address]  Expires: 3/6/2017 10:35 AM      2) Create a username and password to use when you visit [facility] in the future and select a security question in case you lose your password and select Next.    3) Enter your e-mail address and click Sign Up!    Additional Information  If you have questions, please e-mail [email or web address] or call [phone] to talk to our [facility] staff. Remember, [facility] is NOT to be used for urgent needs. For medical emergencies, dial 911.          [facility] complies with applicable Federal civil rights laws and does not discriminate on the basis of race, color, national origin, age, disability, or sex.

## 2017-02-15 NOTE — H&P (VIEW-ONLY)
Subjective:       Patient ID: Marvin Ray is a 56 y.o. male.    Chief Complaint: Pre-op Exam      Past Medical History   Diagnosis Date    Arthritis     Cataract     Diabetes mellitus     Diabetic retinopathy     Glaucoma     Hyperlipemia     Hypertension      Past Surgical History   Procedure Laterality Date    Tonsillectomy      Toe amputation      Cyst removal      Right foot surgery  10/2014    Cataract extraction w/  intraocular lens implant Left 2012     WITH BAERVEDT//DONE AT [facility]    Baerveldt glaucoma implant Left 2012     WITH CATARACT EXTRACTION//DONE AT [facility]    Ahmed glaucoma implant Left 2011     DONE AT [facility]     Family History   Problem Relation Age of Onset    Diabetes Mother     Heart disease Brother     No Known Problems Father     No Known Problems Sister     No Known Problems Maternal Aunt     No Known Problems Maternal Uncle     No Known Problems Paternal Aunt     No Known Problems Paternal Uncle     No Known Problems Maternal Grandmother     No Known Problems Maternal Grandfather     No Known Problems Paternal Grandmother     No Known Problems Paternal Grandfather     Anemia Neg Hx     Arrhythmia Neg Hx     Asthma Neg Hx     Clotting disorder Neg Hx     Fainting Neg Hx     Heart attack Neg Hx     Heart failure Neg Hx     Hyperlipidemia Neg Hx     Hypertension Neg Hx     Stroke Neg Hx     Atrial Septal Defect Neg Hx     Amblyopia Neg Hx     Blindness Neg Hx     Glaucoma Neg Hx     Macular degeneration Neg Hx     Retinal detachment Neg Hx     Strabismus Neg Hx      Social History     Social History    Marital status: Legally      Spouse name: N/A    Number of children: N/A    Years of education: 14     Social History Main Topics    Smoking status: Former Smoker     Packs/day: 1.00     Years: 3.00     Quit date: 7/11/1984    Smokeless tobacco: Never Used    Alcohol use 0.6 oz/week     1 Cans of beer per week      Comment:  Occasional    Drug use: No    Sexual activity: Not Currently     Other Topics Concern    None     Social History Narrative       Current Outpatient Prescriptions   Medication Sig Dispense Refill    allopurinol (ZYLOPRIM) 100 MG tablet Take 2 tablets (200 mg total) by mouth once daily. 180 tablet 3    amlodipine (NORVASC) 10 MG tablet Take 0.5 tablets (5 mg total) by mouth once daily. 30 tablet 11    aspirin (ECOTRIN) 81 MG EC tablet Take 1 tablet (81 mg total) by mouth once daily.  0    benazepril (LOTENSIN) 40 MG tablet Take 1 tablet (40 mg total) by mouth 2 (two) times daily. 180 tablet 3    brimonidine 0.1% (ALPHAGAN P) 0.1 % Drop Place 1 drop into both eyes 3 (three) times daily. 15 mL 11    carvedilol (COREG) 25 MG tablet Take 1 tablet (25 mg total) by mouth 2 (two) times daily. 60 tablet 1    chlorthalidone (HYGROTEN) 25 MG Tab Take 1 tablet (25 mg total) by mouth once daily. 30 tablet 11    co-enzyme Q-10 50 mg capsule Take 2 capsules (100 mg total) by mouth once daily. 30 capsule 11    dorzolamide-timolol 2-0.5% (COSOPT) 22.3-6.8 mg/mL ophthalmic solution Place 1 drop into both eyes 3 (three) times daily. 10 mL 11    fenofibrate micronized (LOFIBRA) 67 MG capsule Take 1 capsule (67 mg total) by mouth before breakfast. 90 capsule 3    fish oil-omega-3 fatty acids 300-1,000 mg capsule Take 2 capsules (2 g total) by mouth 2 (two) times daily. 120 capsule 5    insulin aspart (NOVOLOG) 100 unit/mL InPn pen Inject 20 Units into the skin 3 (three) times daily with meals. 2 Box 11    latanoprost 0.005 % ophthalmic solution Place 1 drop into the left eye every evening. 2.5 mL 11    metformin (GLUCOPHAGE) 1000 MG tablet Take 1 tablet (1,000 mg total) by mouth 2 (two) times daily with meals. 180 tablet 3    MULTIVIT,THER IRON,CA,FA & MIN (MULTIVITAMIN) Tab Take 1 tablet by mouth once daily.      nitroGLYCERIN (NITROSTAT) 0.4 MG SL tablet Place 1 tablet (0.4 mg total) under the tongue every 5 (five)  "minutes as needed for Chest pain (If CP persists go to ER. If taking NTG notify MD.). 20 tablet 1    pantoprazole (PROTONIX) 20 MG tablet Take 1 tablet (20 mg total) by mouth once daily. 30 tablet 11    pen needle, diabetic 31 gauge x 1/4" Ndle Use as directed 100 each 11    rosuvastatin (CRESTOR) 20 MG tablet Take 1 tablet (20 mg total) by mouth once daily. 90 tablet 3    spironolactone (ALDACTONE) 25 MG tablet Take 1 tablet (25 mg total) by mouth once daily. 30 tablet 11    insulin glargine (LANTUS SOLOSTAR) 100 unit/mL (3 mL) InPn pen Inject 80 Units into the skin every evening. (Patient taking differently: Inject 40 Units into the skin 2 (two) times daily. ) 3 Box 11     No current facility-administered medications for this visit.      Review of patient's allergies indicates:   Allergen Reactions    Statins-hmg-coa reductase inhibitors Other (See Comments)     Pain, Lipitor and Pravastatin    Penicillins Rash       Review of Systems   Constitutional: Negative.    HENT: Negative.    Eyes: Positive for visual disturbance.   Respiratory: Negative.    Cardiovascular: Negative.    Neurological: Negative.    Psychiatric/Behavioral: Negative.        Objective:      There were no vitals filed for this visit.  Physical Exam   Constitutional: He is oriented to person, place, and time. He appears well-developed and well-nourished.   HENT:   Head: Normocephalic and atraumatic.   Eyes:   See eye exam    Cardiovascular: Normal rate.    Pulmonary/Chest: Effort normal.   Neurological: He is alert and oriented to person, place, and time.   Skin: Skin is warm and dry.   Psychiatric: He has a normal mood and affect.            Assessment:       1. Neovascular glaucoma of left eye, severe stage    2. Primary open angle glaucoma of right eye, moderate stage    3. Epiretinal membrane, both eyes    4. Nuclear sclerotic cataract of right eye    5. Proliferative diabetic retinopathy of both eyes with macular edema associated with " type 2 diabetes mellitus    6. Pseudophakia, left eye        Plan:       Cyclo G6 - CB destruction

## 2017-02-16 ENCOUNTER — OFFICE VISIT (OUTPATIENT)
Dept: OPHTHALMOLOGY | Facility: CLINIC | Age: 57
End: 2017-02-16
Payer: COMMERCIAL

## 2017-02-16 DIAGNOSIS — H35.373 EPIRETINAL MEMBRANE, BOTH EYES: ICD-10-CM

## 2017-02-16 DIAGNOSIS — E11.3512 DIABETIC MACULAR EDEMA OF LEFT EYE WITH PROLIFERATIVE RETINOPATHY ASSOCIATED WITH TYPE 2 DIABETES MELLITUS: ICD-10-CM

## 2017-02-16 DIAGNOSIS — H40.52X3 NEOVASCULAR GLAUCOMA OF LEFT EYE, SEVERE STAGE: Primary | ICD-10-CM

## 2017-02-16 DIAGNOSIS — H25.11 NUCLEAR SCLEROTIC CATARACT OF RIGHT EYE: ICD-10-CM

## 2017-02-16 DIAGNOSIS — Z96.1 PSEUDOPHAKIA, LEFT EYE: ICD-10-CM

## 2017-02-16 DIAGNOSIS — E11.3513 PROLIFERATIVE DIABETIC RETINOPATHY OF BOTH EYES WITH MACULAR EDEMA ASSOCIATED WITH TYPE 2 DIABETES MELLITUS: ICD-10-CM

## 2017-02-16 DIAGNOSIS — H40.1112 PRIMARY OPEN ANGLE GLAUCOMA OF RIGHT EYE, MODERATE STAGE: ICD-10-CM

## 2017-02-16 PROCEDURE — 99999 PR PBB SHADOW E&M-EST. PATIENT-LVL II: CPT | Mod: PBBFAC,,, | Performed by: OPHTHALMOLOGY

## 2017-02-16 PROCEDURE — 99024 POSTOP FOLLOW-UP VISIT: CPT | Mod: S$GLB,,, | Performed by: OPHTHALMOLOGY

## 2017-02-16 RX ORDER — PREDNISOLONE ACETATE 10 MG/ML
1 SUSPENSION/ DROPS OPHTHALMIC 4 TIMES DAILY
COMMUNITY
Start: 2017-02-16 | End: 2017-02-23 | Stop reason: SDUPTHER

## 2017-02-16 RX ORDER — MOXIFLOXACIN 5 MG/ML
1 SOLUTION/ DROPS OPHTHALMIC 4 TIMES DAILY
COMMUNITY
Start: 2017-02-15 | End: 2017-03-17 | Stop reason: ALTCHOICE

## 2017-02-16 NOTE — PROGRESS NOTES
HPI     DLS: 2/03/17    Pt here for 1 day post cyclo G6-CB destruction OS- 2/15/17  Pt states no pain or discomfort.    Meds:    1. Post operative state  2. Neovascular glaucoma of left eye, severe stage  3. Primary open angle glaucoma of right eye, moderate stage  4. Epiretinal membrane, both eyes  5. Nuclear sclerotic cataract of right eye  6. 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 Pseudophakia, left eye        Last edited by Brenda Don on 2/16/2017  8:44 AM.         Assessment /Plan     For exam results, see Encounter Report.    Neovascular glaucoma of left eye, severe stage    Primary open angle glaucoma of right eye, moderate stage    Epiretinal membrane, both eyes    Nuclear sclerotic cataract of right eye    Proliferative diabetic retinopathy of both eyes with macular edema associated with type 2 diabetes mellitus    Pseudophakia, left eye    Diabetic macular edema of left eye with proliferative retinopathy associated with type 2 diabetes mellitus        Pt has been followed in the retina and glaucoma clinics at [facility] for many years  Now is transferring to [facility] main [facility] (2016)   S/P PRP ou for PDR ou   S/P multiple avastin injections ou  + NVG os // ? POAG od   S/P ahmed os 2011  S/P phaco/IOL / baerveldt os 2012     A/P    1. NVG OS 2/2 PDR // ?? POAG od - on gtts    FH:   CCT:449 // 540 (? [facility])    Gonio: OD +3; OS sup +3/nasal PAS/temp PAS/inf small PAS   Surgeries:AGV 2011;  phaco/BV 2012 (both at [facility])    Lasers: SLT   Tmax:    Tbase (before treatment):   T range on gtts from 2014 to 2016 12-19 od // 16-25 os ([facility])    Ttarget:     C/D: 0.4//0.9    Drop intolerance: ??    APD: ?trace OS    HVF 2012, 2013, 2014, 2015 - ([facility])      [facility] 2016 to 2016 - SAD / IAD od // gen dep - SAD / IAD OS     OCT 2015 - RNFL - dec S, bord N/I od // dec S/I, bord N os ([facility])       [facility] - 1 test 2016 to 2016  RNFL  - dec. S/I od // dec. S/I, bord N   HRT 1 test 2017 to 2017: full od // dec inf os /// CDR 0.48 od // 0.69 os    T today: 14//22  Test done: HRT/Gonio       - Stopped Xalatan OD due to macular edema  - pt reports good adherence  - cont xal os, cosopt tid ou, alphagan tid ou      2.  PDR s/p PRP OU (DM2), with CSME OU (and HTN)  - Encouraged good BS/BP/Cholesterol control    3. DME OU  OD>OS  S/p Avastin OD x 9, OS x 7  Will monitor OS for now given NVG and ERM    4. NSC OD  - Not VS    5. PSK OS  - Stable, CTM, RD precautions       6. ERM OS  Given NVG hx, monitor  - CTM      IOP OS above goal-   S/P GDD x 2 os - the ST one is retracting out of cornea - just barely in - still functioning - lrg bleb over plate       S/P G6 CB destruction - OS - 2/16/2017   POD # 1  + conj injection   + minimal AC reation   IOP 22 (unchanged from pre-op)  Cont all old gtts   Add PA tid os and atropine q day os     F/U 1 week     Sees Mazzulla q 2 mos for antiveg therapy     I have seen and personally examined the patient.  I agree with the findings, assessment and plan of the resident and/or fellow.     Perla Cortés MD

## 2017-02-23 ENCOUNTER — OFFICE VISIT (OUTPATIENT)
Dept: OPHTHALMOLOGY | Facility: CLINIC | Age: 57
End: 2017-02-23
Payer: COMMERCIAL

## 2017-02-23 DIAGNOSIS — H25.11 NUCLEAR SCLEROTIC CATARACT OF RIGHT EYE: ICD-10-CM

## 2017-02-23 DIAGNOSIS — H35.373 EPIRETINAL MEMBRANE, BOTH EYES: ICD-10-CM

## 2017-02-23 DIAGNOSIS — E11.3512 DIABETIC MACULAR EDEMA OF LEFT EYE WITH PROLIFERATIVE RETINOPATHY ASSOCIATED WITH TYPE 2 DIABETES MELLITUS: ICD-10-CM

## 2017-02-23 DIAGNOSIS — Z48.89 ENCOUNTER FOR POSTOPERATIVE CARE: Primary | ICD-10-CM

## 2017-02-23 DIAGNOSIS — H40.1112 PRIMARY OPEN ANGLE GLAUCOMA OF RIGHT EYE, MODERATE STAGE: ICD-10-CM

## 2017-02-23 DIAGNOSIS — Z96.1 PSEUDOPHAKIA, LEFT EYE: ICD-10-CM

## 2017-02-23 DIAGNOSIS — E11.3513 PROLIFERATIVE DIABETIC RETINOPATHY OF BOTH EYES WITH MACULAR EDEMA ASSOCIATED WITH TYPE 2 DIABETES MELLITUS: ICD-10-CM

## 2017-02-23 DIAGNOSIS — H40.52X3 NEOVASCULAR GLAUCOMA OF LEFT EYE, SEVERE STAGE: ICD-10-CM

## 2017-02-23 PROCEDURE — 99024 POSTOP FOLLOW-UP VISIT: CPT | Mod: S$GLB,,, | Performed by: OPHTHALMOLOGY

## 2017-02-23 PROCEDURE — 99999 PR PBB SHADOW E&M-EST. PATIENT-LVL III: CPT | Mod: PBBFAC,,, | Performed by: OPHTHALMOLOGY

## 2017-02-23 RX ORDER — PREDNISOLONE ACETATE 10 MG/ML
1 SUSPENSION/ DROPS OPHTHALMIC 2 TIMES DAILY
Qty: 1 BOTTLE | Refills: 1 | Status: SHIPPED | OUTPATIENT
Start: 2017-02-23 | End: 2017-05-02

## 2017-02-23 NOTE — PROGRESS NOTES
HPI     DLS: 2/16/17    Pt here for 1 week post cyclo G6-CB destruction OS- 2/15/17  Pt states no pain or discomfort.    Meds: PA tid os            Atropine qday os             Briominidine tid ou            Dorzolamide / timolol tid ou            Xalatan qhs os    1. Post operative state  2. Neovascular glaucoma of left eye, severe stage  3. Primary open angle glaucoma of right eye, moderate stage  4. Epiretinal membrane, both eyes  5. Nuclear sclerotic cataract of right eye  6. 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 Pseudophakia, left eye       Last edited by Perla Cortés MD on 2/23/2017  8:36 AM.         Assessment /Plan     For exam results, see Encounter Report.    Encounter for postoperative care    Neovascular glaucoma of left eye, severe stage    Primary open angle glaucoma of right eye, moderate stage    Epiretinal membrane, both eyes    Nuclear sclerotic cataract of right eye    Proliferative diabetic retinopathy of both eyes with macular edema associated with type 2 diabetes mellitus    Pseudophakia, left eye    Diabetic macular edema of left eye with proliferative retinopathy associated with type 2 diabetes mellitus    Other orders  -     prednisoLONE acetate (PRED FORTE) 1 % DrpS; Place 1 drop into the left eye 2 (two) times daily.  Dispense: 1 Bottle; Refill: 1    Pt has been followed in the retina and glaucoma clinics at [facility] for many years  Now is transferring to [facility] main [facility] (2016)   S/P PRP ou for PDR ou   S/P multiple avastin injections ou  + NVG os // ? POAG od   S/P ahmed os 2011  S/P phaco/IOL / baerveldt os 2012     A/P    1. NVG OS 2/2 PDR // ?? POAG od - on gtts    FH:   CCT:449 // 540 (? [facility])    Gonio: OD +3; OS sup +3/nasal PAS/temp PAS/inf small PAS   Surgeries:AGV 2011;  phaco/BV 2012 (both at [facility])    Lasers: SLT   Tmax:    Tbase (before treatment):   T range on gtts from 2014 to 2016 12-19  od // 16-25 os ([facility])    Ttarget:     C/D: 0.4//0.9    Drop intolerance: ??    APD: ?trace OS    HVF 2012, 2013, 2014, 2015 - ([facility])      [facility] - 1 test 2016 to 2016 - SAD / IAD od // gen dep - SAD / IAD OS     OCT 2015 - RNFL - dec S, bord N/I od // dec S/I, bord N os ([facility])       [facility] - 1 test 2016 to 2016  RNFL - dec. S/I od // dec. S/I, bord N   HRT 1 test 2017 to 2017: full od // dec inf os /// CDR 0.48 od // 0.69 os    T today: 14//22  Test done: HRT/Gonio       - Stopped Xalatan OD due to macular edema  - pt reports good adherence  - cont xal os, cosopt tid ou, alphagan tid ou      2.  PDR s/p PRP OU (DM2), with CSME OU (and HTN)  - Encouraged good BS/BP/Cholesterol control    3. DME OU  OD>OS  S/p Avastin OD x 9, OS x 7  Will monitor OS for now given NVG and ERM    4. NSC OD  - Not VS    5. PSK OS  - Stable, CTM, RD precautions       6. ERM OS  Given NVG hx, monitor  - CTM      IOP OS above goal-   S/P GDD x 2 os - the ST one is retracting out of cornea - just barely in - still functioning - lrg bleb over plate       S/P G6 CB destruction - OS - 2/16/2017   POW # 1  + conj injection   + minimal AC reation   IOP - 9 (( down from 22 pre-op)  Cont all old gtts   Cont  PA bid os and atropine q day os     Cont cosopt ou tid   Cont alphagan tid ou   Stop latanoprost - was using os only - 2/2 DME od     F/U - 3  week     Sees Gasper q 2 mos for antiveg therapy     I have seen and personally examined the patient.  I agree with the findings, assessment and plan of the resident and/or fellow.     Perla Cortés MD

## 2017-03-17 ENCOUNTER — OFFICE VISIT (OUTPATIENT)
Dept: OPHTHALMOLOGY | Facility: CLINIC | Age: 57
End: 2017-03-17
Payer: COMMERCIAL

## 2017-03-17 DIAGNOSIS — H25.11 NUCLEAR SCLEROTIC CATARACT OF RIGHT EYE: ICD-10-CM

## 2017-03-17 DIAGNOSIS — H40.52X3 NEOVASCULAR GLAUCOMA OF LEFT EYE, SEVERE STAGE: ICD-10-CM

## 2017-03-17 DIAGNOSIS — H35.373 EPIRETINAL MEMBRANE, BOTH EYES: ICD-10-CM

## 2017-03-17 DIAGNOSIS — Z96.1 PSEUDOPHAKIA, LEFT EYE: ICD-10-CM

## 2017-03-17 DIAGNOSIS — E11.3512 DIABETIC MACULAR EDEMA OF LEFT EYE WITH PROLIFERATIVE RETINOPATHY ASSOCIATED WITH TYPE 2 DIABETES MELLITUS: ICD-10-CM

## 2017-03-17 DIAGNOSIS — E11.3513 PROLIFERATIVE DIABETIC RETINOPATHY OF BOTH EYES WITH MACULAR EDEMA ASSOCIATED WITH TYPE 2 DIABETES MELLITUS: ICD-10-CM

## 2017-03-17 DIAGNOSIS — H40.1112 PRIMARY OPEN ANGLE GLAUCOMA OF RIGHT EYE, MODERATE STAGE: ICD-10-CM

## 2017-03-17 DIAGNOSIS — Z48.89 ENCOUNTER FOR POSTOPERATIVE CARE: Primary | ICD-10-CM

## 2017-03-17 PROCEDURE — 99999 PR PBB SHADOW E&M-EST. PATIENT-LVL III: CPT | Mod: PBBFAC,,, | Performed by: OPHTHALMOLOGY

## 2017-03-17 PROCEDURE — 99024 POSTOP FOLLOW-UP VISIT: CPT | Mod: S$GLB,,, | Performed by: OPHTHALMOLOGY

## 2017-03-17 NOTE — PROGRESS NOTES
HPI     Post-op Evaluation    Additional comments: Pt states no complaints this morning           Comments   S/p CB Destruction with Cyclo G6 -- OS -  2/15/17    MEDS:  PA BID OS  Atropine QDAY OS  Cossopt TID OU  Alphagan TID OU       Last edited by Perla Cortés MD on 3/17/2017  9:29 AM. (History)            Assessment /Plan     For exam results, see Encounter Report.    Encounter for postoperative care    Neovascular glaucoma of left eye, severe stage    Primary open angle glaucoma of right eye, moderate stage    Epiretinal membrane, both eyes    Nuclear sclerotic cataract of right eye    Proliferative diabetic retinopathy of both eyes with macular edema associated with type 2 diabetes mellitus    Pseudophakia, left eye    Diabetic macular edema of left eye with proliferative retinopathy associated with type 2 diabetes mellitus      Pt has been followed in the retina and glaucoma clinics at [facility] for many years  Now is transferring to [facility] (2016)   S/P PRP ou for PDR ou   S/P multiple avastin injections ou  + NVG os // ? POAG od   S/P ahmed os 2011  S/P phaco/IOL / baerveldt os 2012     A/P    1. NVG OS 2/2 PDR // ?? POAG od - on gtts    FH:   CCT:449 // 540 (? [facility])    Gonio: OD +3; OS sup +3/nasal PAS/temp PAS/inf small PAS   Surgeries:AGV 2011;  phaco/BV 2012 (both at [facility])    Lasers: SLT   Tmax:    Tbase (before treatment):   T range on gtts from 2014 to 2016 12-19 od // 16-25 os ([facility])    Ttarget:     C/D: 0.4//0.9    Drop intolerance: ??    APD: ?trace OS    HVF 2012, 2013, 2014, 2015 - ([facility])      [facility] - 1 test 2016 to 2016 - SAD / IAD od // gen dep - SAD / IAD OS     OCT 2015 - RNFL - dec S, bord N/I od // dec S/I, bord N os ([facility])       [facility] - 1 test 2016 to 2016  RNFL - dec. S/I od // dec. S/I, bord N   HRT 1 test 2017 to 2017: full od // dec inf os /// CDR 0.48 od // 0.69 os    T today: 14//22  Test done: HRT/Gonio       - Stopped Xalatan OD due to macular  edema  - pt reports good adherence  - cont xal os, cosopt tid ou, alphagan tid ou      2.  PDR s/p PRP OU (DM2), with CSME OU (and HTN)  - Encouraged good BS/BP/Cholesterol control    3. DME OU  OD>OS  S/p Avastin OD x 9, OS x 7  Will monitor OS for now given NVG and ERM    4. NSC OD  - Not VS    5. PSK OS  - Stable, CTM, RD precautions       6. ERM OS  Given NVG hx, monitor  - CTM      IOP OS above goal-   S/P GDD x 2 os - the ST one is retracting out of cornea - just barely in - still functioning - lrg bleb over plate       S/P G6 CB destruction - OS - 2/16/2017 // pre-op IOP was 22   POM # 1  + conj injection   + minimal AC reation   IOP -  (( was 22 pre-op) // at 1 week IOP was 9 // at 1 month IOP is 15   PA bid os  Decrease to q day - stop if runs out   atropine q day os - STOP     Cont cosopt ou tid   Cont alphagan tid ou   Stay off  latanoprost - was using os only - 2/2 DME od     F/U - 4- 6   Weeks - on a Tuesday at [facility] so stephen can see patient     If IOP goes up can re-start PG's os and / or repeat cyclo G6    Sees Gasper q 2 mos for antiveg therapy     I have seen and personally examined the patient.  I agree with the findings, assessment and plan of the resident and/or fellow.     Perla Cortés MD

## 2017-05-02 ENCOUNTER — OFFICE VISIT (OUTPATIENT)
Dept: OPHTHALMOLOGY | Facility: CLINIC | Age: 57
End: 2017-05-02
Payer: COMMERCIAL

## 2017-05-02 DIAGNOSIS — E11.3513 PROLIFERATIVE DIABETIC RETINOPATHY OF BOTH EYES WITH MACULAR EDEMA ASSOCIATED WITH TYPE 2 DIABETES MELLITUS: ICD-10-CM

## 2017-05-02 DIAGNOSIS — Z48.89 ENCOUNTER FOR POSTOPERATIVE CARE: Primary | ICD-10-CM

## 2017-05-02 DIAGNOSIS — E11.3511 DIABETIC MACULAR EDEMA OF RIGHT EYE WITH PROLIFERATIVE RETINOPATHY ASSOCIATED WITH TYPE 2 DIABETES MELLITUS: ICD-10-CM

## 2017-05-02 DIAGNOSIS — H40.1112 PRIMARY OPEN ANGLE GLAUCOMA OF RIGHT EYE, MODERATE STAGE: ICD-10-CM

## 2017-05-02 DIAGNOSIS — H25.11 NUCLEAR SCLEROTIC CATARACT OF RIGHT EYE: ICD-10-CM

## 2017-05-02 DIAGNOSIS — H40.52X3 NEOVASCULAR GLAUCOMA OF LEFT EYE, SEVERE STAGE: ICD-10-CM

## 2017-05-02 DIAGNOSIS — H35.373 EPIRETINAL MEMBRANE, BOTH EYES: ICD-10-CM

## 2017-05-02 PROCEDURE — 99999 PR PBB SHADOW E&M-EST. PATIENT-LVL III: CPT | Mod: PBBFAC,,, | Performed by: OPHTHALMOLOGY

## 2017-05-02 PROCEDURE — 99024 POSTOP FOLLOW-UP VISIT: CPT | Mod: S$GLB,,, | Performed by: OPHTHALMOLOGY

## 2017-05-02 NOTE — PROGRESS NOTES
HPI     DLS: 3/17/17    Pt here for post CB destruction with Cyclo G6- OS- 2/15/17    Meds: Cosopt tid ou              Alphagan tid ou    1. Post operative state  2. Neovascular glaucoma of left eye, severe stage  3. Primary open angle glaucoma of right eye, moderate stage  4. Epiretinal membrane, both eyes  5. Nuclear sclerotic cataract of right eye  6. Proliferative diabetic retinopathy of both eyes with macular edema   associated with type 2 diabetes mellitus  7. Pseudophakia, left eye  8. Diabetic macular edema of left eye with proliferative retinopathy   assoicated with type 2 diabetes mellitus        Last edited by Brenda Don on 5/2/2017  8:20 AM.         Assessment /Plan     For exam results, see Encounter Report.    Encounter for postoperative care    Neovascular glaucoma of left eye, severe stage    Primary open angle glaucoma of right eye, moderate stage    Epiretinal membrane, both eyes    Nuclear sclerotic cataract of right eye    Proliferative diabetic retinopathy of both eyes with macular edema associated with type 2 diabetes mellitus    Diabetic macular edema of right eye with proliferative retinopathy associated with type 2 diabetes mellitus    Pt has been followed in the retina and glaucoma clinics at [facility] for many years  Now is transferring to [facility] (2016)   S/P PRP ou for PDR ou   S/P multiple avastin injections ou  + NVG os // ? POAG od   S/P ahmed os 2011  S/P phaco/IOL / baerveldt os 2012     A/P    1. NVG OS 2/2 PDR // ?? POAG od - on gtts    FH:   CCT:449 // 540 (? [facility])    Gonio: OD +3; OS sup +3/nasal PAS/temp PAS/inf small PAS   Surgeries:AGV 2011;  phaco/BV 2012 (both at [facility])    Lasers: SLT, Cyclo G6 (2/15/17)    Tmax:    Tbase (before treatment):   T range on gtts from 2014 to 2016 12-19 od // 16-25 os ([facility])    Ttarget:     C/D: 0.4//0.9    Drop intolerance: ??    APD: ?trace OS    HVF 2012, 2013, 2014, 2015 - ([facility])      [facility] - 1 test 2016 to 2016 -  SAD / IAD od // gen dep - SAD / IAD OS     OCT 2015 - RNFL - dec S, bord N/I od // dec S/I, bord N os ([facility])       [facility] - 1 test 2016 to 2016  RNFL - dec. S/I od // dec. S/I, bord N   HRT 1 test 2017 to 2017: full od // dec inf os /// CDR 0.48 od // 0.69 os    T today: 10//12  Test done: POST G6 OS        - Stopped Xalatan OD due to macular edema  - pt reports good adherence  - cont xal os, cosopt tid ou, alphagan tid ou      2.  PDR s/p PRP OU (DM2), with CSME OU (and HTN)  - Encouraged good BS/BP/Cholesterol control    3. DME OU  OD>OS  S/p Avastin OD x 9, OS x 7  Will monitor OS for now given NVG and ERM    4. NSC OD  - Not VS    5. PSK OS  - Stable, CTM, RD precautions       6. ERM OS  Given NVG hx, monitor  - CTM      IOP OS above goal-   S/P GDD x 2 os - the ST one is retracting out of cornea - just barely in - still functioning - lrg bleb over plate       S/P G6 CB destruction - OS - 2/16/2017 // pre-op IOP was 22   POM # 1.5  + conj injection   + minimal AC reation   IOP -  (( was 22 pre-op) // at 1 week IOP was 9 // at 1 month IOP was 15 // at 2 mo out is 12    PA bid os  Decrease to q day - stop if runs out   atropine q day os - STOP     Cont cosopt ou tid   Cont alphagan tid ou   Stay off  latanoprost - was using os only - 2/2 DME od     F/U - 2  months - on a Tuesday at [facility] so stephen can see patient  (will be outside the 3 mos post op period)    If IOP goes up can re-start PG's os and / or repeat cyclo G6    Sees Gasper q 2 mos for antiveg therapy     I have seen and personally examined the patient.  I agree with the findings, assessment and plan of the resident and/or fellow.     Perla Cortés MD

## 2017-05-08 ENCOUNTER — OFFICE VISIT (OUTPATIENT)
Dept: OPHTHALMOLOGY | Facility: CLINIC | Age: 57
End: 2017-05-08
Payer: COMMERCIAL

## 2017-05-08 VITALS — DIASTOLIC BLOOD PRESSURE: 84 MMHG | SYSTOLIC BLOOD PRESSURE: 141 MMHG | HEART RATE: 86 BPM

## 2017-05-08 DIAGNOSIS — E11.3512 DIABETIC MACULAR EDEMA OF LEFT EYE WITH PROLIFERATIVE RETINOPATHY ASSOCIATED WITH TYPE 2 DIABETES MELLITUS: ICD-10-CM

## 2017-05-08 DIAGNOSIS — E11.3511 DIABETIC MACULAR EDEMA OF RIGHT EYE WITH PROLIFERATIVE RETINOPATHY ASSOCIATED WITH TYPE 2 DIABETES MELLITUS: Primary | ICD-10-CM

## 2017-05-08 PROCEDURE — 3079F DIAST BP 80-89 MM HG: CPT | Mod: S$GLB,,, | Performed by: OPHTHALMOLOGY

## 2017-05-08 PROCEDURE — 92134 CPTRZ OPH DX IMG PST SGM RTA: CPT | Mod: S$GLB,,, | Performed by: OPHTHALMOLOGY

## 2017-05-08 PROCEDURE — 99999 PR PBB SHADOW E&M-EST. PATIENT-LVL III: CPT | Mod: PBBFAC,,, | Performed by: OPHTHALMOLOGY

## 2017-05-08 PROCEDURE — 3077F SYST BP >= 140 MM HG: CPT | Mod: S$GLB,,, | Performed by: OPHTHALMOLOGY

## 2017-05-08 PROCEDURE — 67028 INJECTION EYE DRUG: CPT | Mod: 79,RT,S$GLB, | Performed by: OPHTHALMOLOGY

## 2017-05-08 PROCEDURE — 92014 COMPRE OPH EXAM EST PT 1/>: CPT | Mod: 24,25,S$GLB, | Performed by: OPHTHALMOLOGY

## 2017-05-08 RX ADMIN — Medication 1.25 MG: at 02:05

## 2017-05-08 NOTE — MR AVS SNAPSHOT
[facility]hthal[facility]  [address]  [facility] [address]  Phone: [phone]  Fax: [phone]                  Marvin Mosleyue   2017 1:40 PM   Office Visit    Description:  Male : 1960   Provider:  LISA Jackman MD   Department:  [facility] Ophthalmology           Reason for Visit     Eye Problem           Diagnoses this Visit        Comments    Diabetic macular edema of right eye with proliferative retinopathy associated with type 2 diabetes mellitus    -  Primary     Diabetic macular edema of left eye with proliferative retinopathy associated with type 2 diabetes mellitus                To Do List           Future Appointments        Provider Department Dept Phone    2017 8:00 AM Perla Cortés MD [facility] [phone]      Goals (5 Years of Data)     None      Follow-Up and Disposition     Return in about 3 months (around 2017).      [facility] On [facility]     [facility] On Call Nurse Care Line -  Assistance  Unless otherwise directed by your provider, please contact [facility], our nurse care line that is available for  assistance.     Registered nurses in the [facility] provide: appointment scheduling, clinical advisement, health education, and other advisory services.  Call: [phone] (toll free)               Medications           These medications were administered today        Dose Freq    bevacizumab (AVASTIN) 2.5 mg/0.10 mL 1.25 mg 1.25 mg Clinic/HOD 1 time    Sig: Inject 0.05 mLs (1.25 mg total) into the eye one time.    Class: Normal    Route: Intraocular           Verify that the below list of medications is an accurate representation of the medications you are currently taking.  If none reported, the list may be blank. If incorrect, please contact your healthcare provider. Carry this list with you in case of emergency.           Current Medications     allopurinol (ZYLOPRIM) 100 MG tablet Take 2 tablets  "(200 mg total) by mouth once daily.    amlodipine (NORVASC) 10 MG tablet Take 0.5 tablets (5 mg total) by mouth once daily.    aspirin (ECOTRIN) 81 MG EC tablet Take 1 tablet (81 mg total) by mouth once daily.    benazepril (LOTENSIN) 40 MG tablet Take 1 tablet (40 mg total) by mouth 2 (two) times daily.    brimonidine 0.1% (ALPHAGAN P) 0.1 % Drop Place 1 drop into both eyes 3 (three) times daily.    carvedilol (COREG) 25 MG tablet Take 1 tablet (25 mg total) by mouth 2 (two) times daily.    chlorthalidone (HYGROTEN) 25 MG Tab Take 1 tablet (25 mg total) by mouth once daily.    coenzyme Q10 100 mg capsule Take 100 mg by mouth every morning.    dorzolamide-timolol 2-0.5% (COSOPT) 22.3-6.8 mg/mL ophthalmic solution Place 1 drop into both eyes 3 (three) times daily.    fenofibrate micronized (LOFIBRA) 67 MG capsule Take 1 capsule (67 mg total) by mouth before breakfast.    fish oil-omega-3 fatty acids 300-1,000 mg capsule Take 2 capsules (2 g total) by mouth 2 (two) times daily.    insulin aspart (NOVOLOG) 100 unit/mL InPn pen Inject 20 Units into the skin 3 (three) times daily with meals.    metformin (GLUCOPHAGE) 1000 MG tablet Take 1 tablet (1,000 mg total) by mouth 2 (two) times daily with meals.    MULTIVIT,THER IRON,CA,FA & MIN (MULTIVITAMIN) Tab Take 1 tablet by mouth every morning.     nitroGLYCERIN (NITROSTAT) 0.4 MG SL tablet Place 1 tablet (0.4 mg total) under the tongue every 5 (five) minutes as needed for Chest pain (If CP persists go to ER. If taking NTG notify [facility]).    pantoprazole (PROTONIX) 20 MG tablet Take 1 tablet (20 mg total) by mouth once daily.    pen needle, diabetic 31 gauge x 1/4" Ndle Use as directed    rosuvastatin (CRESTOR) 20 MG tablet Take 1 tablet (20 mg total) by mouth once daily.    spironolactone (ALDACTONE) 25 MG tablet Take 1 tablet (25 mg total) by mouth once daily.    insulin glargine (LANTUS SOLOSTAR) 100 unit/mL (3 mL) InPn pen Inject 80 Units into the skin every evening.    "        Clinical Reference Information           Your Vitals Were     BP Pulse                141/84 (BP Location: Right arm, Patient Position: Sitting, BP Method: Automatic) 86          Blood Pressure          Most Recent Value    BP  (!)  141/84      Allergies as of 5/8/2017     Statins-hmg-coa Reductase Inhibitors    Penicillins      Immunizations Administered on Date of Encounter - 5/8/2017     None      Orders Placed During Today's Visit      Normal Orders This Visit    Posterior Segment OCT Retina-Both eyes     Prior Authorization Order     Future Labs/Procedures Expected by Expires    Posterior Segment OCT Retina-Both eyes  As directed 5/8/2018      [facility] Sign-Up     Activating your [facility] account is as easy as 1-2-3!     1) Visit [email or web address], select Sign Up Now, enter this activation code and your date of birth, then select Next.  [email or web address]  Expires: 6/22/2017  2:48 PM      2) Create a username and password to use when you visit [facility] in the future and select a security question in case you lose your password and select Next.    3) Enter your e-mail address and click Sign Up!    Additional Information  If you have questions, please e-mail [email or web address] or call [phone] to talk to our [facility] staff. Remember, [facility] is NOT to be used for urgent needs. For medical emergencies, dial 911.         Language Assistance Services     ATTENTION: Language assistance services are available, free of charge. Please call [phone].      ATENCIÓN: Si habla español, tiene a quinonez disposición servicios gratuitos de asistencia lingüística. Llame al [phone].     CHÚ Ý: N?u b?n nói Ti?ng Vi?t, có các d?ch v? h? tr? ngôn ng? mi?n phí dành cho b?n. G?i s? [phone].         [facility] complies with applicable Federal civil rights laws and does not discriminate on the basis of race, color, national origin, age, disability, or sex.

## 2017-05-08 NOTE — PROGRESS NOTES
Prior OCT : OD shows central DME, stable   OS ERM with DME central cyst, stable    Prior FA: shows macular leakage OU, significant NP OU, no NV OU, enlarged SURINDER OU    A/P    1. PDR s/p PRP OU (DM2), with CSME OU (and HTN)  - Encouraged good BS/BP/Cholesterol control    2. DME OU, OD>OS  - S/p Avastin OD x 11, OS x 7  - Will monitor OS for now given NVG and ERM  - Could consider Ozurdex but pt is POAG and phakic, IOP good right now)  - Avastin OD x 12    Continue extension protocol      2. NVG OS   FH:   CCT:449 // 540    Gonio: OD CBB; OS sup CBB/nasal small NV at 9:00/inf PAS/temp CBB with PAS   Surgeries:;  phaco/BV 2012   Lasers: SLT   Tmax:    Tbase (before treatment):   Ttarget:     Drop intolerance:    APD:     - Stopped Xalatan OD due to macular edema  - pt reports good adherence  - No NVA OD. 1 NVA vessel at 9 oclock OS  - Cont drops as above- Rx refilled    Sees KL    3. NSC OD  - Not VS      4. PSK OS  - Stable, CTM, RD precautions       5. ERM OS>OD  - Given NVG hx, monitor  - CTM    12 weeks OCT    Risks, benefits, and alternatives to treatment discussed in detail with the patient.  The patient voiced understanding and wished to proceed with the procedure    Injection Procedure Note:  Diagnosis: DME OD    Topical Proparacaine and Betadine.  Inject Avastin OD at 6:00 @ 3.5-4mm posterior to limbus  Post Operative Dx: Same  Complications: None  Follow up as above.

## 2017-05-08 NOTE — PATIENT INSTRUCTIONS

## 2017-06-11 DIAGNOSIS — H40.52X3 NEOVASCULAR GLAUCOMA, LEFT EYE, SEVERE STAGE: ICD-10-CM

## 2017-06-12 RX ORDER — BRIMONIDINE TARTRATE 1 MG/ML
SOLUTION/ DROPS OPHTHALMIC
Qty: 15 ML | Refills: 12 | Status: SHIPPED | OUTPATIENT
Start: 2017-06-12 | End: 2017-11-20 | Stop reason: SDUPTHER

## 2017-08-08 ENCOUNTER — PROCEDURE VISIT (OUTPATIENT)
Dept: OPHTHALMOLOGY | Facility: CLINIC | Age: 57
End: 2017-08-08
Payer: COMMERCIAL

## 2017-08-08 VITALS — DIASTOLIC BLOOD PRESSURE: 96 MMHG | SYSTOLIC BLOOD PRESSURE: 148 MMHG | HEART RATE: 97 BPM

## 2017-08-08 DIAGNOSIS — H40.52X3 NEOVASCULAR GLAUCOMA OF LEFT EYE, SEVERE STAGE: ICD-10-CM

## 2017-08-08 DIAGNOSIS — E11.3511 DIABETIC MACULAR EDEMA OF RIGHT EYE WITH PROLIFERATIVE RETINOPATHY ASSOCIATED WITH TYPE 2 DIABETES MELLITUS: ICD-10-CM

## 2017-08-08 DIAGNOSIS — H35.373 EPIRETINAL MEMBRANE, BOTH EYES: ICD-10-CM

## 2017-08-08 PROCEDURE — 92134 CPTRZ OPH DX IMG PST SGM RTA: CPT | Mod: S$GLB,,, | Performed by: OPHTHALMOLOGY

## 2017-08-08 PROCEDURE — 92014 COMPRE OPH EXAM EST PT 1/>: CPT | Mod: 25,S$GLB,, | Performed by: OPHTHALMOLOGY

## 2017-08-08 PROCEDURE — 67028 INJECTION EYE DRUG: CPT | Mod: RT,S$GLB,, | Performed by: OPHTHALMOLOGY

## 2017-08-08 RX ADMIN — Medication 1.25 MG: at 02:08

## 2017-08-08 NOTE — PATIENT INSTRUCTIONS

## 2017-08-08 NOTE — PROGRESS NOTES
HPI     Eye Problem    Additional comments: 3 mos check           Comments   DLS 5/8/17- Eyes seem about the same as last visit      Prior OCT : OD shows central DME, stable   OS ERM with DME central cyst, stable    Prior FA: shows macular leakage OU, significant NP OU, no NV OU, enlarged SURINDER OU    A/P    1. PDR s/p PRP OU (DM2), with CSME OU (and HTN)  - Encouraged good BS/BP/Cholesterol control  T2 uncontrolled on insulin    2. DME OU, OD>OS  - S/p Avastin OD x 12, OS x 7  - Will monitor OS for now given NVG and ERM  - Could consider Ozurdex but pt is POAG and phakic, IOP good right now)  - Avastin OD x 13    Continue extension protocol      2. NVG OS   FH:   CCT:449 // 540    Gonio: OD CBB; OS sup CBB/nasal small NV at 9:00/inf PAS/temp CBB with PAS   Surgeries:;  phaco/BV 2012   Lasers: SLT   Tmax:    Tbase (before treatment):   Ttarget:     Drop intolerance:    APD:     - Stopped Xalatan OD due to macular edema  - pt reports good adherence  - No NVA OD. 1 NVA vessel at 9 oclock OS  - Cont drops as above- Rx refilled    Sees KL    3. NSC OD  - Not VS      4. PSK OS  - Stable, CTM, RD precautions       5. ERM OS>OD  - Given NVG hx, monitor  - CTM    12 weeks OCT    Risks, benefits, and alternatives to treatment discussed in detail with the patient.  The patient voiced understanding and wished to proceed with the procedure    Injection Procedure Note:  Diagnosis: DME OD    Topical Proparacaine and Betadine.  Inject Avastin OD at 6:00 @ 3.5-4mm posterior to limbus  Post Operative Dx: Same  Complications: None  Follow up as above.

## 2017-11-14 ENCOUNTER — PROCEDURE VISIT (OUTPATIENT)
Dept: OPHTHALMOLOGY | Facility: CLINIC | Age: 57
End: 2017-11-14
Payer: COMMERCIAL

## 2017-11-14 VITALS — DIASTOLIC BLOOD PRESSURE: 73 MMHG | HEART RATE: 83 BPM | SYSTOLIC BLOOD PRESSURE: 107 MMHG

## 2017-11-14 DIAGNOSIS — H40.52X3 NEOVASCULAR GLAUCOMA OF LEFT EYE, SEVERE STAGE: ICD-10-CM

## 2017-11-14 DIAGNOSIS — H35.373 EPIRETINAL MEMBRANE, BOTH EYES: ICD-10-CM

## 2017-11-14 DIAGNOSIS — H40.53X2 NEOVASCULAR GLAUCOMA OF BOTH EYES, MODERATE STAGE: ICD-10-CM

## 2017-11-14 PROCEDURE — 92134 CPTRZ OPH DX IMG PST SGM RTA: CPT | Mod: S$GLB,,, | Performed by: OPHTHALMOLOGY

## 2017-11-14 PROCEDURE — 67028 INJECTION EYE DRUG: CPT | Mod: RT,S$GLB,, | Performed by: OPHTHALMOLOGY

## 2017-11-14 PROCEDURE — 92014 COMPRE OPH EXAM EST PT 1/>: CPT | Mod: 25,S$GLB,, | Performed by: OPHTHALMOLOGY

## 2017-11-14 RX ADMIN — Medication 1.25 MG: at 02:11

## 2017-11-14 NOTE — PROGRESS NOTES
"HPI     PDR    Additional comments: OU/ OCT           Comments   DLS 8/8/17     Pt states he occas has a "Veil" over Va. Pt denies floaters and flashes.     Eyemeds  Cosopt TID OU  Alphagan TID OU         OCT : OD shows central DME, stable   OS ERM with DME central cyst, stable    Prior FA: shows macular leakage OU, significant NP OU, no NV OU, enlarged SURINDER OU    A/P    1. PDR s/p PRP OU (DM2), with CSME OU (and HTN)  - Encouraged good BS/BP/Cholesterol control  T2 uncontrolled on insulin    2. DME OU, OD>OS  - S/p Avastin OD x 13, OS x 7  - Will monitor OS for now given NVG and ERM  - Could consider Ozurdex but pt is POAG and phakic, IOP good right now)  - Avastin OD x 14    Continue extension protocol      2. NVG OS   FH:   CCT:449 // 540    Gonio: OD CBB; OS sup CBB/nasal small NV at 9:00/inf PAS/temp CBB with PAS   Surgeries:;  phaco/BV 2012   Lasers: SLT   Tmax:    Tbase (before treatment):   Ttarget:     Drop intolerance:    APD:     - Stopped Xalatan OD due to macular edema  - pt reports good adherence  - No NVA OD. 1 NVA vessel at 9 oclock OS  - Cont drops as above- Rx refilled    Sees KL    3. NSC OD  - Not VS      4. PSK OS  - Stable, CTM, RD precautions       5. ERM OS>OD  - Given NVG hx, monitor  - CTM    16 weeks OCT    Risks, benefits, and alternatives to treatment discussed in detail with the patient.  The patient voiced understanding and wished to proceed with the procedure    Injection Procedure Note:  Diagnosis: DME OD    Topical Proparacaine and Betadine.  Inject Avastin OD at 6:00 @ 3.5-4mm posterior to limbus  Post Operative Dx: Same  Complications: None  Follow up as above.    "

## 2017-11-14 NOTE — PATIENT INSTRUCTIONS

## 2017-11-20 ENCOUNTER — OFFICE VISIT (OUTPATIENT)
Dept: OPHTHALMOLOGY | Facility: CLINIC | Age: 57
End: 2017-11-20
Payer: COMMERCIAL

## 2017-11-20 DIAGNOSIS — H40.52X3 NEOVASCULAR GLAUCOMA OF LEFT EYE, SEVERE STAGE: Primary | ICD-10-CM

## 2017-11-20 DIAGNOSIS — H25.11 NUCLEAR SCLEROTIC CATARACT OF RIGHT EYE: ICD-10-CM

## 2017-11-20 DIAGNOSIS — H40.1112 PRIMARY OPEN ANGLE GLAUCOMA OF RIGHT EYE, MODERATE STAGE: ICD-10-CM

## 2017-11-20 DIAGNOSIS — E11.3511 DIABETIC MACULAR EDEMA OF RIGHT EYE WITH PROLIFERATIVE RETINOPATHY ASSOCIATED WITH TYPE 2 DIABETES MELLITUS: ICD-10-CM

## 2017-11-20 DIAGNOSIS — E11.3512 DIABETIC MACULAR EDEMA OF LEFT EYE WITH PROLIFERATIVE RETINOPATHY ASSOCIATED WITH TYPE 2 DIABETES MELLITUS: ICD-10-CM

## 2017-11-20 DIAGNOSIS — H35.373 EPIRETINAL MEMBRANE, BOTH EYES: ICD-10-CM

## 2017-11-20 PROCEDURE — 92012 INTRM OPH EXAM EST PATIENT: CPT | Mod: S$GLB,,, | Performed by: OPHTHALMOLOGY

## 2017-11-20 PROCEDURE — 99999 PR PBB SHADOW E&M-EST. PATIENT-LVL II: CPT | Mod: PBBFAC,,, | Performed by: OPHTHALMOLOGY

## 2017-11-20 RX ORDER — BRIMONIDINE TARTRATE 1 MG/ML
1 SOLUTION/ DROPS OPHTHALMIC 3 TIMES DAILY
Qty: 15 ML | Refills: 12 | Status: SHIPPED | OUTPATIENT
Start: 2017-11-20 | End: 2018-03-06 | Stop reason: SDUPTHER

## 2017-11-20 RX ORDER — DORZOLAMIDE HYDROCHLORIDE AND TIMOLOL MALEATE 20; 5 MG/ML; MG/ML
1 SOLUTION/ DROPS OPHTHALMIC 3 TIMES DAILY
Qty: 10 ML | Refills: 12 | Status: SHIPPED | OUTPATIENT
Start: 2017-11-20 | End: 2018-03-06 | Stop reason: SDUPTHER

## 2017-11-20 NOTE — PROGRESS NOTES
HPI     Glaucoma    Additional comments: overdue ck           Comments   DLS: 5/2/17    1. NVG OS   2. POAG OD  3. PDR with CSME OU  4. DME OU  5. NS OD  5. PCIOL OS  6. ERM OS    MEDS:  Cosopt TID OU  Alphagan P TID OU       Last edited by Leela Chamorro MA on 11/20/2017  1:33 PM. (History)            Assessment /Plan     For exam results, see Encounter Report.    Neovascular glaucoma of left eye, severe stage  -     Posterior Segment OCT Optic Nerve- Both eyes; Future  -     Pierce Visual Field - OU - Extended - Both Eyes; Future  -     dorzolamide-timolol 2-0.5% (COSOPT) 22.3-6.8 mg/mL ophthalmic solution; Place 1 drop into both eyes 3 (three) times daily.  Dispense: 10 mL; Refill: 12  -     brimonidine 0.1% (ALPHAGAN P) 0.1 % Drop; Place 1 drop into both eyes 3 (three) times daily.  Dispense: 15 mL; Refill: 12    Primary open angle glaucoma of right eye, moderate stage  -     dorzolamide-timolol 2-0.5% (COSOPT) 22.3-6.8 mg/mL ophthalmic solution; Place 1 drop into both eyes 3 (three) times daily.  Dispense: 10 mL; Refill: 12  -     brimonidine 0.1% (ALPHAGAN P) 0.1 % Drop; Place 1 drop into both eyes 3 (three) times daily.  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 POAG od   S/P ahmed os 2011  S/P phaco/IOL / baerveldt os 2012       1. NVG OS 2/2 PDR // ?? POAG od - on gtts     FH:   CCT:449 // 540 (? [facility])    Gonio: OD +3; OS sup +3/nasal PAS/temp PAS/inf small PAS   Surgeries:AGV 2011;  phaco/BV 2012 (both at [facility])    Lasers: SLT, Cyclo G6 (2/15/17)    Tmax:    Tbase (before treatment):   T range on gtts from 2014 to 2016 12-19 od // 16-25 os ([facility])    Ttarget:     C/D: 0.4//0.9    Drop intolerance: ??    APD: ?trace OS    HVF 2012, 2013, 2014, 2015 - ([facility])      [facility] - 1 test 2016 to 2016 - SAD / IAD od // gen dep - SAD / IAD OS     OCT 2015 - RNFL - dec S, bord N/I od // dec S/I, bord N os ([facility])       [facility] - 1 test 2016 to 2016  RNFL - dec. S/I od // dec. S/I, bord N   HRT 1 test 2017 to 2017: full od // dec inf os /// CDR 0.48 od // 0.69 os    T today: 13/14  Test done: IOP check       - Stopped Xalatan OD due to macular edema  - pt reports good adherence  - cont xal os, cosopt tid ou, alphagan tid ou      2.  PDR s/p PRP OU (DM2), with CSME OU (and HTN)  - Encouraged good BS/BP/Cholesterol control    3. DME OU  OD>OS  S/p Avastin OD x 9, OS x 7  Will monitor OS for now given NVG and ERM    4. NSC OD  - Not VS    5. PSK OS  - Stable, CTM, RD precautions       6. ERM OS  Given NVG hx, monitor  - CTM      S/P GDD x 2 os - the ST one is retracting out of cornea - just barely in - still functioning - lrg bleb over plate   S/P G6 CB destruction - OS - 2/16/2017 // pre-op IOP was 22     Cont cosopt ou tid   Cont alphagan tid ou   Stay off  latanoprost - was using os only - 2/2 DME od     F/U - HVF / DFE / OCT   If IOP goes up can re-start PG's os and / or repeat cyclo G6    Tylor Jackman q 2 mos for antiveg therapy     I have seen and personally examined the patient.  I agree with the findings, assessment and plan of the resident and/or fellow.     Perla Cortés MD

## 2018-01-01 DIAGNOSIS — K21.9 GASTROESOPHAGEAL REFLUX DISEASE, ESOPHAGITIS PRESENCE NOT SPECIFIED: ICD-10-CM

## 2018-01-02 RX ORDER — PANTOPRAZOLE SODIUM 20 MG/1
TABLET, DELAYED RELEASE ORAL
Qty: 30 TABLET | Refills: 0 | OUTPATIENT
Start: 2018-01-02

## 2018-03-06 ENCOUNTER — OFFICE VISIT (OUTPATIENT)
Dept: OPHTHALMOLOGY | Facility: CLINIC | Age: 58
End: 2018-03-06
Payer: COMMERCIAL

## 2018-03-06 ENCOUNTER — CLINICAL SUPPORT (OUTPATIENT)
Dept: OPHTHALMOLOGY | Facility: CLINIC | Age: 58
End: 2018-03-06
Payer: COMMERCIAL

## 2018-03-06 DIAGNOSIS — H40.1112 PRIMARY OPEN ANGLE GLAUCOMA OF RIGHT EYE, MODERATE STAGE: ICD-10-CM

## 2018-03-06 DIAGNOSIS — E11.3511 DIABETIC MACULAR EDEMA OF RIGHT EYE WITH PROLIFERATIVE RETINOPATHY ASSOCIATED WITH TYPE 2 DIABETES MELLITUS: ICD-10-CM

## 2018-03-06 DIAGNOSIS — H25.11 NUCLEAR SCLEROTIC CATARACT OF RIGHT EYE: ICD-10-CM

## 2018-03-06 DIAGNOSIS — Z96.89 HISTORY OF GLAUCOMA TUBE SHUNT PROCEDURE: ICD-10-CM

## 2018-03-06 DIAGNOSIS — H35.373 EPIRETINAL MEMBRANE, BOTH EYES: ICD-10-CM

## 2018-03-06 DIAGNOSIS — Z96.1 PSEUDOPHAKIA, LEFT EYE: ICD-10-CM

## 2018-03-06 DIAGNOSIS — H40.52X3 NEOVASCULAR GLAUCOMA OF LEFT EYE, SEVERE STAGE: Primary | ICD-10-CM

## 2018-03-06 DIAGNOSIS — E11.3512 DIABETIC MACULAR EDEMA OF LEFT EYE WITH PROLIFERATIVE RETINOPATHY ASSOCIATED WITH TYPE 2 DIABETES MELLITUS: ICD-10-CM

## 2018-03-06 DIAGNOSIS — H40.52X3 NEOVASCULAR GLAUCOMA OF LEFT EYE, SEVERE STAGE: ICD-10-CM

## 2018-03-06 PROCEDURE — 92014 COMPRE OPH EXAM EST PT 1/>: CPT | Mod: S$GLB,,, | Performed by: OPHTHALMOLOGY

## 2018-03-06 PROCEDURE — 92133 CPTRZD OPH DX IMG PST SGM ON: CPT | Mod: S$GLB,,, | Performed by: OPHTHALMOLOGY

## 2018-03-06 PROCEDURE — 92083 EXTENDED VISUAL FIELD XM: CPT | Mod: S$GLB,,, | Performed by: OPHTHALMOLOGY

## 2018-03-06 PROCEDURE — 99999 PR PBB SHADOW E&M-EST. PATIENT-LVL II: CPT | Mod: PBBFAC,,, | Performed by: OPHTHALMOLOGY

## 2018-03-06 RX ORDER — BRIMONIDINE TARTRATE 1 MG/ML
1 SOLUTION/ DROPS OPHTHALMIC 3 TIMES DAILY
Qty: 15 ML | Refills: 12 | Status: ON HOLD | OUTPATIENT
Start: 2018-03-06 | End: 2018-12-11 | Stop reason: HOSPADM

## 2018-03-06 RX ORDER — DORZOLAMIDE HYDROCHLORIDE AND TIMOLOL MALEATE 20; 5 MG/ML; MG/ML
1 SOLUTION/ DROPS OPHTHALMIC 3 TIMES DAILY
Qty: 10 ML | Refills: 12 | Status: SHIPPED | OUTPATIENT
Start: 2018-03-06 | End: 2018-03-06 | Stop reason: SDUPTHER

## 2018-03-06 RX ORDER — DORZOLAMIDE HYDROCHLORIDE AND TIMOLOL MALEATE 20; 5 MG/ML; MG/ML
1 SOLUTION/ DROPS OPHTHALMIC 3 TIMES DAILY
Qty: 10 ML | Refills: 12 | Status: SHIPPED | OUTPATIENT
Start: 2018-03-06 | End: 2018-12-28

## 2018-03-06 NOTE — PROGRESS NOTES
HPI     DLS: 11/20/17    PT here for HVF review;    Meds: Cosopt tid ou             Alphagan P tid ou    1. NVG OS   2. POAG OD   3. PDR with CSME OU   4. DME OU   5. NS OD   5. PCIOL OS   6. ERM OS         Last edited by Brenda Don on 3/6/2018  2:10 PM. (History)            Assessment /Plan     For exam results, see Encounter Report.    Neovascular glaucoma of left eye, severe stage  -     Discontinue: dorzolamide-timolol 2-0.5% (COSOPT) 22.3-6.8 mg/mL ophthalmic solution; Place 1 drop into both eyes 3 (three) times daily.  Dispense: 10 mL; Refill: 12  -     dorzolamide-timolol 2-0.5% (COSOPT) 22.3-6.8 mg/mL ophthalmic solution; Place 1 drop into both eyes 3 (three) times daily.  Dispense: 10 mL; Refill: 12  -     brimonidine 0.1% (ALPHAGAN P) 0.1 % Drop; Place 1 drop into both eyes 3 (three) times daily.  Dispense: 15 mL; Refill: 12  -     Posterior Segment OCT Optic Nerve- Both eyes    Primary open angle glaucoma of right eye, moderate stage  -     Discontinue: dorzolamide-timolol 2-0.5% (COSOPT) 22.3-6.8 mg/mL ophthalmic solution; Place 1 drop into both eyes 3 (three) times daily.  Dispense: 10 mL; Refill: 12  -     dorzolamide-timolol 2-0.5% (COSOPT) 22.3-6.8 mg/mL ophthalmic solution; Place 1 drop into both eyes 3 (three) times daily.  Dispense: 10 mL; Refill: 12  -     brimonidine 0.1% (ALPHAGAN P) 0.1 % Drop; Place 1 drop into both eyes 3 (three) times daily.  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 POAG od   S/P ahmed os 2011  S/P phaco/IOL / baerveldt os 2012       1. NVG OS 2/2 PDR // ?? POAG od - on gtts    FH:   CCT:449 // 540 (? [facility])    Gonio: OD +3; OS sup +3/nasal PAS/temp PAS/inf small PAS   Surgeries:AGV 2011;  phaco/BV 2012 (both at [facility])    Lasers: SLT, Cyclo G6 (2/15/17)    Tmax:    Tbase (before treatment):   T range on gtts from 2014 to 2016 12-19 od // 16-25 os ([facility])    Ttarget:     C/D: 0.4//0.9    Drop intolerance: ??    APD: ?trace OS    HVF 2012, 2013, 2014, 2015 - ([facility])      [facility] - 1 test 2016 to 2016 - SAD / IAD od // gen dep - SAD / IAD OS     OCT 2015 - RNFL - dec S, bord N/I od // dec S/I, bord N os ([facility])       [facility] - 1 test 2016 to 2016  RNFL - dec. S/I od // dec. S/I, bord N   HRT 1 test 2017 to 2017: full od // dec inf os /// CDR 0.48 od // 0.69 os    T today: 11/13  Test done: IOP check / HVF / DFE / OCT        - OFF -  Xalatan OD due to macular edema  - pt reports good adherence  - cont xal os, cosopt tid ou, alphagan tid ou      2.  PDR s/p PRP OU (DM2), with CSME OU (and HTN)  - Encouraged good BS/BP/Cholesterol control    3. DME OU  OD>OS  S/p Avastin OD x 9, OS x 7  Will monitor OS for now given NVG and ERM    4. NSC OD  - Not VS    5. PSK OS  - Stable, CTM, RD precautions       6. ERM OS  Given NVG hx, monitor  - CTM      S/P GDD x 2 os - the ST one is retracting out of cornea - just barely in - still functioning - lrg bleb over plate   S/P G6 CB destruction - OS - 2/16/2017 // pre-op IOP was 22     Cont cosopt ou tid   Cont alphagan tid ou   Stay off  latanoprost - was using os only - 2/2 DME od     F/U -  4 montsh with HRT / goni    If IOP goes up can re-start PG's os and / or repeat cyclo G6    Sees aGsper q 2 mos for antiveg therapy     I  have seen and personally examined the patient.  I agree with the findings, assessment and plan of the resident and/or fellow.     Perla Cortés MD

## 2018-03-09 ENCOUNTER — TELEPHONE (OUTPATIENT)
Dept: OPHTHALMOLOGY | Facility: CLINIC | Age: 58
End: 2018-03-09

## 2018-03-13 ENCOUNTER — PROCEDURE VISIT (OUTPATIENT)
Dept: OPHTHALMOLOGY | Facility: CLINIC | Age: 58
End: 2018-03-13
Payer: COMMERCIAL

## 2018-03-13 DIAGNOSIS — H35.373 EPIRETINAL MEMBRANE, BOTH EYES: ICD-10-CM

## 2018-03-13 DIAGNOSIS — H40.52X3 NEOVASCULAR GLAUCOMA OF LEFT EYE, SEVERE STAGE: ICD-10-CM

## 2018-03-13 DIAGNOSIS — H25.11 NUCLEAR SCLEROTIC CATARACT OF RIGHT EYE: ICD-10-CM

## 2018-03-13 PROCEDURE — 92014 COMPRE OPH EXAM EST PT 1/>: CPT | Mod: 25,S$GLB,, | Performed by: OPHTHALMOLOGY

## 2018-03-13 PROCEDURE — 67028 INJECTION EYE DRUG: CPT | Mod: RT,S$GLB,, | Performed by: OPHTHALMOLOGY

## 2018-03-13 RX ADMIN — Medication 1.25 MG: at 03:03

## 2018-03-13 NOTE — PATIENT INSTRUCTIONS

## 2018-03-13 NOTE — PROGRESS NOTES
"HPI     16 week/ OCT  DLS-11/14/2017 Dr. Jackman    Pt sts va about the same denies pain.     (-)Flashes (+)Floaters  (-)Photophobia  (+)Glare    Eyemeds  Cosopt TID OU  Alphagan TID OU     HPI     PDR    Additional comments: OU/ OCT           Comments   DLS 8/8/17     Pt states he occas has a "Veil" over Va. Pt denies floaters and flashes.     Eyemeds  Cosopt TID OU  Alphagan TID OU         OCT : OD shows central DME, stable   OS ERM with DME central cyst, stable    Prior FA: shows macular leakage OU, significant NP OU, no NV OU, enlarged SURINDER OU    A/P    1. PDR s/p PRP OU (DM2), with CSME OU (and HTN)  - Encouraged good BS/BP/Cholesterol control  T2 uncontrolled on insulin    2. DME OU, OD>OS  - S/p Avastin OD x 14, OS x 7  - Will monitor OS for now given NVG and ERM  - Could consider Ozurdex but pt is POAG and phakic, IOP good right now)  - Avastin OD Today    Continue extension protocol      2. NVG OS   FH:   CCT:449 // 540    Gonio: OD CBB; OS sup CBB/nasal small NV at 9:00/inf PAS/temp CBB with PAS   Surgeries:;  phaco/BV 2012   Lasers: SLT   Tmax:    Tbase (before treatment):   Ttarget:     Drop intolerance:    APD:     - Stopped Xalatan OD due to macular edema  - pt reports good adherence  - No NVA OD. 1 NVA vessel at 9 oclock OS  - Cont drops as above- Rx refilled    Sees KL    3. NSC OD  - becoming VS      4. PSK OS  - Stable, CTM, RD precautions       5. ERM OS>OD  - Given NVG hx, monitor  - CTM    16 weeks OCT    Risks, benefits, and alternatives to treatment discussed in detail with the patient.  The patient voiced understanding and wished to proceed with the procedure    Injection Procedure Note:  Diagnosis: DME OD    Topical Proparacaine and Betadine.  Inject Avastin OD at 6:00 @ 3.5-4mm posterior to limbus  Post Operative Dx: Same  Complications: None  Follow up as above.    "

## 2018-04-25 ENCOUNTER — TELEPHONE (OUTPATIENT)
Dept: OPHTHALMOLOGY | Facility: CLINIC | Age: 58
End: 2018-04-25

## 2018-04-25 NOTE — TELEPHONE ENCOUNTER
Pharmacy has cosopt back in stock. Pt will go back on cosopt. Rx for azopt and timolol taken out of pt's pharmacy profile.

## 2018-07-03 ENCOUNTER — PROCEDURE VISIT (OUTPATIENT)
Dept: OPHTHALMOLOGY | Facility: CLINIC | Age: 58
End: 2018-07-03
Payer: COMMERCIAL

## 2018-07-03 VITALS — SYSTOLIC BLOOD PRESSURE: 117 MMHG | DIASTOLIC BLOOD PRESSURE: 74 MMHG | HEART RATE: 97 BPM

## 2018-07-03 DIAGNOSIS — H40.52X3 NEOVASCULAR GLAUCOMA OF LEFT EYE, SEVERE STAGE: ICD-10-CM

## 2018-07-03 DIAGNOSIS — H35.373 EPIRETINAL MEMBRANE, BOTH EYES: ICD-10-CM

## 2018-07-03 PROCEDURE — 67028 INJECTION EYE DRUG: CPT | Mod: RT,S$GLB,, | Performed by: OPHTHALMOLOGY

## 2018-07-03 PROCEDURE — 92134 CPTRZ OPH DX IMG PST SGM RTA: CPT | Mod: S$GLB,,, | Performed by: OPHTHALMOLOGY

## 2018-07-03 PROCEDURE — 92014 COMPRE OPH EXAM EST PT 1/>: CPT | Mod: 25,S$GLB,, | Performed by: OPHTHALMOLOGY

## 2018-07-03 RX ADMIN — Medication 1.25 MG: at 02:07

## 2018-07-03 NOTE — PATIENT INSTRUCTIONS

## 2018-07-03 NOTE — PROGRESS NOTES
HPI     Eye Problem    Additional comments: 4 mos check           Comments   DLS 3/13/18- Vision OU worse than last visit. He doesn't know how his DM   is doing because he hasn't checked BS in a long time. He hasn't had   insulin for about a year now. His provider is no longer on insurance and   hasn't had appointment since then    Cosopt TID OU   Alphagan TID OU       OCT : OD shows central DME, stable   OS ERM with DME central cyst, stable    Prior FA: shows macular leakage OU, significant NP OU, no NV OU, enlarged SURINDER OU    A/P    1. PDR s/p PRP OU (DM2), with CSME OU (and HTN)  - Encouraged good BS/BP/Cholesterol control  T2 uncontrolled on insulin    2. DME OU, OD>OS  - S/p Avastin OD x 15, OS x 7  - Will monitor OS for now given NVG and ERM  - Could consider Ozurdex but pt is POAG and phakic, IOP good right now)  - Avastin OD Today    Continue extension protocol      2. NVG OS   FH:   CCT:449 // 540    Gonio: OD CBB; OS sup CBB/nasal small NV at 9:00/inf PAS/temp CBB with PAS   Surgeries:;  phaco/BV 2012   Lasers: SLT   Tmax:    Tbase (before treatment):   Ttarget:     Drop intolerance:    APD:     - Stopped Xalatan OD due to macular edema  - pt reports good adherence  - No NVA OD. 1 NVA vessel at 9 oclock OS  - Cont drops as above- Rx refilled    Sees KL    3. NSC OD  -Ok for CE - seeing KL soon      4. PSK OS  - Stable, CTM, RD precautions       5. ERM OS>OD  - Given NVG hx, monitor  - CTM    16 weeks OCT    Risks, benefits, and alternatives to treatment discussed in detail with the patient.  The patient voiced understanding and wished to proceed with the procedure    Injection Procedure Note:  Diagnosis: DME OD    Topical Proparacaine and Betadine.  Inject Avastin OD at 6:00 @ 3.5-4mm posterior to limbus  Post Operative Dx: Same  Complications: None  Follow up as above.

## 2018-07-17 ENCOUNTER — CLINICAL SUPPORT (OUTPATIENT)
Dept: OPHTHALMOLOGY | Facility: CLINIC | Age: 58
End: 2018-07-17
Payer: COMMERCIAL

## 2018-07-17 ENCOUNTER — OFFICE VISIT (OUTPATIENT)
Dept: OPHTHALMOLOGY | Facility: CLINIC | Age: 58
End: 2018-07-17
Payer: COMMERCIAL

## 2018-07-17 DIAGNOSIS — H25.11 NUCLEAR SCLEROTIC CATARACT OF RIGHT EYE: ICD-10-CM

## 2018-07-17 DIAGNOSIS — H40.1112 PRIMARY OPEN ANGLE GLAUCOMA OF RIGHT EYE, MODERATE STAGE: ICD-10-CM

## 2018-07-17 DIAGNOSIS — Z96.1 PSEUDOPHAKIA, LEFT EYE: ICD-10-CM

## 2018-07-17 DIAGNOSIS — H40.52X3 NEOVASCULAR GLAUCOMA OF LEFT EYE, SEVERE STAGE: ICD-10-CM

## 2018-07-17 DIAGNOSIS — H35.373 EPIRETINAL MEMBRANE, BOTH EYES: ICD-10-CM

## 2018-07-17 DIAGNOSIS — E11.3511 DIABETIC MACULAR EDEMA OF RIGHT EYE WITH PROLIFERATIVE RETINOPATHY ASSOCIATED WITH TYPE 2 DIABETES MELLITUS: ICD-10-CM

## 2018-07-17 DIAGNOSIS — H40.52X3 NEOVASCULAR GLAUCOMA OF LEFT EYE, SEVERE STAGE: Primary | ICD-10-CM

## 2018-07-17 PROCEDURE — 92133 CPTRZD OPH DX IMG PST SGM ON: CPT | Mod: S$GLB,,, | Performed by: OPHTHALMOLOGY

## 2018-07-17 PROCEDURE — 99999 PR PBB SHADOW E&M-EST. PATIENT-LVL II: CPT | Mod: PBBFAC,,, | Performed by: OPHTHALMOLOGY

## 2018-07-17 PROCEDURE — 92014 COMPRE OPH EXAM EST PT 1/>: CPT | Mod: S$GLB,,, | Performed by: OPHTHALMOLOGY

## 2018-07-17 NOTE — PROGRESS NOTES
HPI     DLS: 3/06/18    Pt here for HRT review;    Meds: Cosopt tid ou             Alphagan P tid ou    1. NVG OS   2. POAG OD   3. PDR with CSME OU   4. DME OU   5. NS OD   5. PCIOL OS   6. ERM OS     Last edited by Brenda Don on 7/17/2018  2:39 PM. (History)            Assessment /Plan     For exam results, see Encounter Report.    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 POAG od   S/P ahmed os 2011  S/P phaco/IOL / baerveldt os 2012       1. NVG OS 2/2 PDR // ?? POAG od - on gtts    FH:   CCT:449 // 540 (? [facility])    Gonio: OD +3; OS sup +3/nasal PAS/temp PAS/inf small PAS   Surgeries:AGV 2011;  phaco/BV 2012 (both at [facility])    Lasers: SLT, Cyclo G6 (2/15/17)    Tmax:    Tbase (before treatment):   T range on gtts from 2014 to 2016 12-19 od // 16-25 os ([facility])    Ttarget:     C/D: 0.4//0.9    Drop intolerance: ??    APD: ?trace OS    HVF 2012, 2013, 2014, 2015 - ([facility])      [facility] to 2016 - [facility] / IAD od // gen dep - SAD / IAD OS     OCT 2015 - RNFL - dec S, bord N/I od // dec S/I, bord N os ([facility])       [facility] to [facility]  RNFL - dec. S/I od // dec. S/I, bord N   HRT 2 test 2017 to 2018: MR full od // dec TI/ bord NI os /// CDR 0.37 od // 0.55 os    T today: 12/12  Test done: HRT       - OFF -  Xalatan OD due to macular edema  - pt  reports good adherence  - cont xal os, cosopt tid ou, alphagan tid ou      2.  PDR s/p PRP OU (DM2), with CSME OU (and HTN)  - Encouraged good BS/BP/Cholesterol control    3. DME OU  OD>OS  S/p Avastin OD x 9, OS x 7  Will monitor OS for now given NVG and ERM    4. NSC OD  - Not VS    5. PSK OS  - Stable, CTM, RD precautions       6. ERM OS  Given NVG hx, monitor  - CTM      S/P GDD x 2 os - the ST one is retracting out of cornea - just barely in - still functioning - lrg bleb over plate   S/P G6 CB destruction - OS - 2/16/2017 // pre-op IOP was 22     Cont cosopt ou tid   Cont alphagan tid ou   Stay off  latanoprost - was using os only - 2/2 DME od     F/U -  Schedule phaco/IOL od - ok per gasper - ?? Can get avastin inj a week or so before surgery -   - dilates ok // + cortical spokes armd monocular / use trypan blue      If IOP goes up can re-start PG's os and / or repeat cyclo G6    Sees Gasper q 2 mos for antiveg therapy     I have seen and personally examined the patient.  I agree with the findings, assessment and plan of the resident and/or fellow.     Perla Cortés MD

## 2018-07-18 ENCOUNTER — OFFICE VISIT (OUTPATIENT)
Dept: URGENT CARE | Facility: CLINIC | Age: 58
End: 2018-07-18
Payer: COMMERCIAL

## 2018-07-18 ENCOUNTER — TELEPHONE (OUTPATIENT)
Dept: FAMILY MEDICINE | Facility: CLINIC | Age: 58
End: 2018-07-18

## 2018-07-18 VITALS
TEMPERATURE: 98 F | HEART RATE: 110 BPM | WEIGHT: 220 LBS | HEIGHT: 70 IN | BODY MASS INDEX: 31.5 KG/M2 | SYSTOLIC BLOOD PRESSURE: 147 MMHG | DIASTOLIC BLOOD PRESSURE: 91 MMHG | OXYGEN SATURATION: 98 %

## 2018-07-18 DIAGNOSIS — J01.90 ACUTE BACTERIAL RHINOSINUSITIS: Primary | ICD-10-CM

## 2018-07-18 DIAGNOSIS — R05.9 COUGH: ICD-10-CM

## 2018-07-18 DIAGNOSIS — B96.89 ACUTE BACTERIAL RHINOSINUSITIS: Primary | ICD-10-CM

## 2018-07-18 PROCEDURE — 3080F DIAST BP >= 90 MM HG: CPT | Mod: CPTII,S$GLB,, | Performed by: NURSE PRACTITIONER

## 2018-07-18 PROCEDURE — 99214 OFFICE O/P EST MOD 30 MIN: CPT | Mod: S$GLB,,, | Performed by: NURSE PRACTITIONER

## 2018-07-18 PROCEDURE — 3008F BODY MASS INDEX DOCD: CPT | Mod: CPTII,S$GLB,, | Performed by: NURSE PRACTITIONER

## 2018-07-18 PROCEDURE — 3077F SYST BP >= 140 MM HG: CPT | Mod: CPTII,S$GLB,, | Performed by: NURSE PRACTITIONER

## 2018-07-18 RX ORDER — PROMETHAZINE HYDROCHLORIDE AND DEXTROMETHORPHAN HYDROBROMIDE 6.25; 15 MG/5ML; MG/5ML
5 SYRUP ORAL NIGHTLY PRN
Qty: 120 ML | Refills: 0 | Status: SHIPPED | OUTPATIENT
Start: 2018-07-18 | End: 2018-08-09

## 2018-07-18 RX ORDER — DOXYCYCLINE 100 MG/1
100 CAPSULE ORAL 2 TIMES DAILY
Qty: 20 CAPSULE | Refills: 0 | Status: SHIPPED | OUTPATIENT
Start: 2018-07-18 | End: 2018-07-28

## 2018-07-18 NOTE — TELEPHONE ENCOUNTER
----- Message from Nori Gutierrez sent at 7/18/2018  9:19 AM CDT -----  Contact: Pt wife Mrs Ray  Pt is requesting a callback says he need to come in today and only want to see Dr Davis    Attempted to schedule same day visit for cough and no availability    Mrs. Ray can be reached at [phone]    Thanks

## 2018-07-18 NOTE — TELEPHONE ENCOUNTER
Spoke with patient and friend rhina who states not sure if they are going to urgent care or keep scheduled appt. Will call back to clinic to inform.

## 2018-07-18 NOTE — PROGRESS NOTES
"Subjective:       Patient ID: Marvin Ray is a 58 y.o. male.    Vitals:  height is 5' 10" (1.778 m) and weight is 99.8 kg (220 lb). His temperature is 98 °F (36.7 °C). His blood pressure is 147/91 (abnormal) and his pulse is 110. His oxygen saturation is 98%.     Chief Complaint: Sinus Problem    The patient complains of intermittent sinus issues over the last few months. He recently started taking Zyrtec daily with minimal relief. He complains of sinus pressure, headaches, and worsening cough that is unproductive. He has also attempted to use Mucinex with no relief. He denies any CP or SOB. The cough is worse at night while laying down. He denies fever or body aches.       Sinus Problem   This is a new problem. The current episode started in the past 7 days. The problem has been gradually worsening since onset. There has been no fever. He is experiencing no pain. Associated symptoms include congestion, coughing and shortness of breath. Pertinent negatives include no chills, ear pain, headaches, hoarse voice or sore throat.     Review of Systems   Constitution: Negative for chills, fever and malaise/fatigue.   HENT: Positive for congestion. Negative for ear pain, hoarse voice and sore throat.    Eyes: Negative for discharge and redness.   Cardiovascular: Negative for chest pain, dyspnea on exertion and leg swelling.   Respiratory: Positive for cough and shortness of breath. Negative for sputum production and wheezing.    Musculoskeletal: Negative for myalgias.   Gastrointestinal: Negative for abdominal pain and nausea.   Neurological: Negative for headaches.   All other systems reviewed and are negative.      Objective:      Physical Exam   Constitutional: He is oriented to person, place, and time. He appears well-developed and well-nourished. He is cooperative.  Non-toxic appearance. He does not appear ill. No distress.   HENT:   Head: Normocephalic and atraumatic.   Right Ear: Hearing, tympanic membrane, " external ear and ear canal normal.   Left Ear: Hearing, tympanic membrane, external ear and ear canal normal.   Nose: Mucosal edema and rhinorrhea present. No nasal deformity. No epistaxis. Right sinus exhibits frontal sinus tenderness. Right sinus exhibits no maxillary sinus tenderness. Left sinus exhibits frontal sinus tenderness. Left sinus exhibits no maxillary sinus tenderness.   Mouth/Throat: Uvula is midline and mucous membranes are normal. No trismus in the jaw. Normal dentition. No uvula swelling. Posterior oropharyngeal edema and posterior oropharyngeal erythema present. Tonsils are 1+ on the right. Tonsils are 1+ on the left. No tonsillar exudate.   Eyes: Conjunctivae and lids are normal. No scleral icterus.   Sclera clear bilat   Neck: Trachea normal, full passive range of motion without pain and phonation normal. Neck supple.   Cardiovascular: Normal rate, regular rhythm, normal heart sounds, intact distal pulses and normal pulses.    Pulmonary/Chest: Effort normal and breath sounds normal. No respiratory distress.           Abdominal: Soft. Normal appearance and bowel sounds are normal. He exhibits no distension. There is no tenderness.   Musculoskeletal: Normal range of motion. He exhibits no edema or deformity.   Lymphadenopathy:        Head (right side): No submental and no submandibular adenopathy present.        Head (left side): No submental adenopathy present.     He has no cervical adenopathy.   Neurological: He is alert and oriented to person, place, and time. He exhibits normal muscle tone. Coordination normal.   Skin: Skin is warm, dry and intact. He is not diaphoretic. No pallor.   Psychiatric: He has a normal mood and affect. His speech is normal and behavior is normal. Judgment and thought content normal. Cognition and memory are normal.   Nursing note and vitals reviewed.        Reading Physician Reading Date Result Priority   Krista Butterfield MD 7/18/2018    Narrative      EXAMINATION:  XR CHEST PA AND LATERAL    CLINICAL HISTORY:  Cough    TECHNIQUE:  PA and lateral views of the chest were performed.    COMPARISON:  Prior dated 05/06/2016    FINDINGS:  The mediastinal structures are midline.  The cardiac silhouette is not enlarged.  There is no evidence of acute pulmonary disease, pleural disease, lymph node enlargement, or cardiac decompensation.  No osseous abnormalities are identified.      Impression       Clear lungs.      Electronically signed by: Krista Butterfield MD  Date: 07/18/2018  Time: 15:13          Assessment:       1. Acute bacterial rhinosinusitis    2. Cough        Plan:         Acute bacterial rhinosinusitis  -     doxycycline (VIBRAMYCIN) 100 MG Cap; Take 1 capsule (100 mg total) by mouth 2 (two) times daily. for 10 days  Dispense: 20 capsule; Refill: 0    Cough  -     X-Ray Chest PA And Lateral; Future; Expected date: 07/18/2018  -     promethazine-dextromethorphan (PROMETHAZINE-DM) 6.25-15 mg/5 mL Syrp; Take 5 mLs by mouth nightly as needed.  Dispense: 120 mL; Refill: 0    -Nasacort and Xyzal daily samples given in the clinic today.       Patient Instructions   Sinusitis (Antibiotic Treatment)    The sinuses are air-filled spaces within the bones of the face. They connect to the inside of the nose. Sinusitis is an inflammation of the tissue lining the sinus cavity. Sinus inflammation can occur during a cold. It can also be due to allergies to pollens and other particles in the air. Sinusitis can cause symptoms of sinus congestion and fullness. A sinus infection causes fever, headache and facial pain. There is often green or yellow drainage from the nose or into the back of the throat (post-nasal drip). You have been given antibiotics to treat this condition.  Home care:  · Take the full course of antibiotics as instructed. Do not stop taking them, even if you feel better.  · Drink plenty of water, hot tea, and other liquids. This may help thin mucus. It also  may promote sinus drainage.  · Heat may help soothe painful areas of the face. Use a towel soaked in hot water. Or,  the shower and direct the hot spray onto your face. Using a vaporizer along with a menthol rub at night may also help.   · An expectorant containing guaifenesin may help thin the mucus and promote drainage from the sinuses.  · Over-the-counter decongestants may be used unless a similar medicine was prescribed. Nasal sprays work the fastest. Use one that contains phenylephrine or oxymetazoline. First blow the nose gently. Then use the spray. Do not use these medicines more often than directed on the label or symptoms may get worse. You may also use tablets containing pseudoephedrine. Avoid products that combine ingredients, because side effects may be increased. Read labels. You can also ask the pharmacist for help. (NOTE: Persons with high blood pressure should not use decongestants. They can raise blood pressure.)  · Over-the-counter antihistamines may help if allergies contributed to your sinusitis.    · Do not use nasal rinses or irrigation during an acute sinus infection, unless told to by your health care provider. Rinsing may spread the infection to other sinuses.  · Use acetaminophen or ibuprofen to control pain, unless another pain medicine was prescribed. (If you have chronic liver or kidney disease or ever had a stomach ulcer, talk with your doctor before using these medicines. Aspirin should never be used in anyone under 18 years of age who is ill with a fever. It may cause severe liver damage.)  · Don't smoke. This can worsen symptoms.  Follow-up care  Follow up with your healthcare provider or our staff if you are not improving within the next week.  When to seek medical advice  Call your healthcare provider if any of these occur:  · Facial pain or headache becoming more severe  · Stiff neck  · Unusual drowsiness or confusion  · Swelling of the forehead or eyelids  · Vision  "problems, including blurred or double vision  · Fever of 100.4ºF (38ºC) or higher, or as directed by your healthcare provider  · Seizure  · Breathing problems  · Symptoms not resolving within 10 days  Date Last Reviewed: 4/13/2015  © 9121-4025 [employer]. [address] [place] [address]. All rights reserved. This information is not intended as a substitute for professional medical care. Always follow your healthcare professional's instructions.                                                                    Sinusitis   If your condition worsens or fails to improve we recommend that you receive another evaluation at the ER immediately or contact your PCP to discuss your concerns or return here. You must understand that you've received an urgent care treatment only and that you may be released before all your medical problems are known or treated. You the patient will arrange for followup care as instructed.   If we discussed that I think your illness is viral it will not respond to antibiotics and it will last 10-14 days. However, if over the next few days the symptoms worsen start the antibiotics I have given you.   If we discussed that you require antibiotics start them now and take them to completion.   If you are female and on BCP and do take the antibiotics, use additional methods to prevent pregnancy while on the antibiotics and for one cycle after.   Flonase (fluticasone) is a nasal spray which is available over the counter and may help with your symptoms   Zyrtec D, Claritin D or allegra D can also help with symptoms of congestion and drainage.   If you have hypertension avoid using the "D" which is the decongestant   If you just have drainage you can take plain zyrtec, claritin or allegra   If you just have a congested feeling you can take pseudoephedrine (unless you have high blood pressure) which you have to sign for behind the counter. Do not buy the phenylephrine which is on " the shelf as it is not effective   Rest and fluids are also important.   Tylenol or ibuprofen can also be used as directed for pain unless you have an allergy to them or medical condition such as stomach ulcers, kidney or liver disease or blood thinners etc for which you should not be taking these type of medications.   If you are flying in the next few days Afrin nose drops for the airplane flight upon take off and landing may help. Other than at those times refrain from using afrin.   If you were prescribed a narcotic do not drive or operate heavy machinery while taking these medications.     -Please take antibiotic to completion.  -Below are suggestions for symptomatic relief:              -Tylenol every 4 hours OR ibuprofen every 6 hours as needed for pain/fever.              -Salt water gargles to soothe throat pain.              -Chloroseptic spray also helps to numb throat pain.              -Nasal saline spray reduces inflammation and dryness.              -Warm face compresses to help with facial sinus pain/pressure.              -Vicks vapor rub at night.              -Flonase OTC or Nasacort OTC for nasal congestion.              -Simple foods like chicken noodle soup.              -Delsym helps with coughing at night              -Zyrtec/Claritin during the day & Benadryl at night may help with allergies.    -Your chest xray today was negative.  -Start the Xyzal daily and the Nasacort daily.  -10 days of antibiotics.  -Be sure to drink plenty of fluids.  -Cough syrup as needed for the cough.  -Follow up with your primary care doctor.  -If your symptoms worsen go to the ER.    Please follow up with your Primary care provider within 2-5 days if your signs and symptoms have not resolved or worsen.     If your condition worsens or fails to improve we recommend that you receive another evaluation at the emergency room immediately or contact your primary medical clinic to discuss your concerns.   You must  understand that you have received an Urgent Care treatment only and that you may be released before all of your medical problems are known or treated. You, the patient, will arrange for follow up care as instructed.

## 2018-07-18 NOTE — TELEPHONE ENCOUNTER
Spoke with friend rhina who states she will take patient to urgent care and call and follow up with clinic

## 2018-07-18 NOTE — TELEPHONE ENCOUNTER
Returned wife's call. No available appts at this location. Wife states that pt has a sinus infection and congestion. Appt has been scheduled at walk in clinic for this afternoon

## 2018-07-25 ENCOUNTER — TELEPHONE (OUTPATIENT)
Dept: OPHTHALMOLOGY | Facility: CLINIC | Age: 58
End: 2018-07-25

## 2018-07-25 DIAGNOSIS — H25.11 NUCLEAR SCLEROTIC CATARACT OF RIGHT EYE: Primary | ICD-10-CM

## 2018-07-27 ENCOUNTER — LAB VISIT (OUTPATIENT)
Dept: LAB | Facility: HOSPITAL | Age: 58
End: 2018-07-27
Attending: FAMILY MEDICINE
Payer: COMMERCIAL

## 2018-07-27 ENCOUNTER — OFFICE VISIT (OUTPATIENT)
Dept: FAMILY MEDICINE | Facility: CLINIC | Age: 58
End: 2018-07-27
Payer: COMMERCIAL

## 2018-07-27 VITALS
OXYGEN SATURATION: 98 % | TEMPERATURE: 98 F | BODY MASS INDEX: 30.17 KG/M2 | DIASTOLIC BLOOD PRESSURE: 100 MMHG | RESPIRATION RATE: 12 BRPM | SYSTOLIC BLOOD PRESSURE: 158 MMHG | WEIGHT: 210.75 LBS | HEIGHT: 70 IN | HEART RATE: 100 BPM

## 2018-07-27 DIAGNOSIS — I10 ESSENTIAL HYPERTENSION: ICD-10-CM

## 2018-07-27 DIAGNOSIS — E78.5 HYPERLIPIDEMIA, UNSPECIFIED HYPERLIPIDEMIA TYPE: ICD-10-CM

## 2018-07-27 DIAGNOSIS — E11.42 DM TYPE 2 WITH DIABETIC PERIPHERAL NEUROPATHY: ICD-10-CM

## 2018-07-27 DIAGNOSIS — T46.6X5A STATIN MYOPATHY: ICD-10-CM

## 2018-07-27 DIAGNOSIS — J32.0 CHRONIC MAXILLARY SINUSITIS: ICD-10-CM

## 2018-07-27 DIAGNOSIS — G72.0 STATIN MYOPATHY: ICD-10-CM

## 2018-07-27 DIAGNOSIS — Z00.00 VISIT FOR WELL MAN HEALTH CHECK: ICD-10-CM

## 2018-07-27 DIAGNOSIS — Z00.00 VISIT FOR WELL MAN HEALTH CHECK: Primary | ICD-10-CM

## 2018-07-27 LAB
ALBUMIN SERPL BCP-MCNC: 3.2 G/DL
ALP SERPL-CCNC: 134 U/L
ALT SERPL W/O P-5'-P-CCNC: 28 U/L
ANION GAP SERPL CALC-SCNC: 11 MMOL/L
AST SERPL-CCNC: 27 U/L
BASOPHILS # BLD AUTO: 0.03 K/UL
BASOPHILS NFR BLD: 0.4 %
BILIRUB SERPL-MCNC: 0.9 MG/DL
BUN SERPL-MCNC: 31 MG/DL
CALCIUM SERPL-MCNC: 9.7 MG/DL
CHLORIDE SERPL-SCNC: 98 MMOL/L
CHOLEST SERPL-MCNC: 151 MG/DL
CHOLEST/HDLC SERPL: 5.2 {RATIO}
CO2 SERPL-SCNC: 22 MMOL/L
CREAT SERPL-MCNC: 1.3 MG/DL
DIFFERENTIAL METHOD: ABNORMAL
EOSINOPHIL # BLD AUTO: 0.2 K/UL
EOSINOPHIL NFR BLD: 2 %
ERYTHROCYTE [DISTWIDTH] IN BLOOD BY AUTOMATED COUNT: 15.3 %
EST. GFR  (AFRICAN AMERICAN): >60 ML/MIN/1.73 M^2
EST. GFR  (NON AFRICAN AMERICAN): 59 ML/MIN/1.73 M^2
ESTIMATED AVG GLUCOSE: ABNORMAL MG/DL
GLUCOSE SERPL-MCNC: 306 MG/DL
HBA1C MFR BLD HPLC: >14 %
HCT VFR BLD AUTO: 44.1 %
HDLC SERPL-MCNC: 29 MG/DL
HDLC SERPL: 19.2 %
HGB BLD-MCNC: 14.8 G/DL
LDLC SERPL CALC-MCNC: 105.4 MG/DL
LYMPHOCYTES # BLD AUTO: 1.4 K/UL
LYMPHOCYTES NFR BLD: 18.6 %
MCH RBC QN AUTO: 30.4 PG
MCHC RBC AUTO-ENTMCNC: 33.6 G/DL
MCV RBC AUTO: 91 FL
MONOCYTES # BLD AUTO: 1 K/UL
MONOCYTES NFR BLD: 13.2 %
NEUTROPHILS # BLD AUTO: 4.8 K/UL
NEUTROPHILS NFR BLD: 65.1 %
NONHDLC SERPL-MCNC: 122 MG/DL
PLATELET # BLD AUTO: 282 K/UL
PMV BLD AUTO: 10.2 FL
POTASSIUM SERPL-SCNC: 5 MMOL/L
PROT SERPL-MCNC: 7.2 G/DL
RBC # BLD AUTO: 4.87 M/UL
SODIUM SERPL-SCNC: 131 MMOL/L
TRIGL SERPL-MCNC: 83 MG/DL
WBC # BLD AUTO: 7.41 K/UL

## 2018-07-27 PROCEDURE — 85025 COMPLETE CBC W/AUTO DIFF WBC: CPT

## 2018-07-27 PROCEDURE — 80053 COMPREHEN METABOLIC PANEL: CPT

## 2018-07-27 PROCEDURE — 83036 HEMOGLOBIN GLYCOSYLATED A1C: CPT

## 2018-07-27 PROCEDURE — 3077F SYST BP >= 140 MM HG: CPT | Mod: CPTII,S$GLB,, | Performed by: FAMILY MEDICINE

## 2018-07-27 PROCEDURE — 99396 PREV VISIT EST AGE 40-64: CPT | Mod: S$GLB,,, | Performed by: FAMILY MEDICINE

## 2018-07-27 PROCEDURE — 99999 PR PBB SHADOW E&M-EST. PATIENT-LVL IV: CPT | Mod: PBBFAC,,, | Performed by: FAMILY MEDICINE

## 2018-07-27 PROCEDURE — 80061 LIPID PANEL: CPT

## 2018-07-27 PROCEDURE — 36415 COLL VENOUS BLD VENIPUNCTURE: CPT | Mod: PN

## 2018-07-27 PROCEDURE — 3080F DIAST BP >= 90 MM HG: CPT | Mod: CPTII,S$GLB,, | Performed by: FAMILY MEDICINE

## 2018-07-27 RX ORDER — METFORMIN HYDROCHLORIDE 1000 MG/1
1000 TABLET ORAL 2 TIMES DAILY WITH MEALS
Qty: 60 TABLET | Refills: 0 | Status: SHIPPED | OUTPATIENT
Start: 2018-07-27 | End: 2018-08-24 | Stop reason: SDUPTHER

## 2018-07-27 RX ORDER — INSULIN GLARGINE 100 [IU]/ML
40 INJECTION, SOLUTION SUBCUTANEOUS 2 TIMES DAILY
Qty: 24 ML | Refills: 1 | Status: SHIPPED | OUTPATIENT
Start: 2018-07-27 | End: 2018-08-10 | Stop reason: ALTCHOICE

## 2018-07-27 RX ORDER — INSULIN ASPART 100 [IU]/ML
12 INJECTION, SOLUTION INTRAVENOUS; SUBCUTANEOUS
Qty: 2 BOX | Refills: 11 | Status: SHIPPED | OUTPATIENT
Start: 2018-07-27 | End: 2018-10-19 | Stop reason: ALTCHOICE

## 2018-07-27 NOTE — PROGRESS NOTES
"Well Man VISIT      CHIEF COMPLAINT  Chief Complaint   Patient presents with    hospital followup     sinus infection        HPI  Marvin Ray is a 58 y.o. male who presents for physical.     Social Factors  Tobacco use: No  Ready to Quit: No  Alcohol: Yes on occasion  Intimate partner violence screening  "Do you feel safe in your current relationship?" Yes   "Have you ever been in a relationship in which your partner frightened you or hurt you?" No  Living Will/POA: No  Regular Exercise: No    Depression  Over the past two weeks, have you felt down, depressed, or hopeless? No  Over the past two weeks, have you felt little interest or pleasure in doing things? No    Reproductive Health  STD screening in last year: deferred  HIV screening: deferred    Screen for Chronic Disease  CHD Risk Factors: advanced age (older than 55 for men, 65 for women), dyslipidemia, hypertension, male gender and obesity (BMI >= 30 kg/m2)  Estimated body mass index is 30.24 kg/m² as calculated from the following:    Height as of this encounter: 5' 10" (1.778 m).    Weight as of this encounter: 95.6 kg (210 lb 12.2 oz).  Dyslipidemia screening needed: order 7/27/18  T2DM screening needed: order 7/27/18  Colonoscopy needed: UTD  PSA needed: order 7/27/18  AAA screening needed:n/a  Screen men 35 years and older, and men 20 to 34 years of age who have cardiovascular risk factors for dyslipidemia  Begin screening colonoscopies at 50 years of age in men of average risk, and continue until 75 years of age; offer fecal occult blood testing every year, flexible sigmoidoscopy every five years combined with fecal occult blood testing every three years, or colonoscopy every 10 years   The American Urological Association recommends offering PSA testing and digital rectal examination to well-informed men beginning at 40 years of age and continuing until life expectancy is less than 10 years  Screen once with ultrasonography in men 65 to 75 " "years of age if they have a family history or have smoked at [id number] cigarettes in their lifetime  Screen men with a sustained blood pressure greater than 135/80 mm Hg for T2DM      Immunizations  delayed    ALLERGIES and MEDS were verified.   PMHx, PSHx, FHx, SOCIALHx were updated as pertinent.    REVIEW OF SYSTEMS  Review of Systems   Constitutional: Negative.    HENT: Negative.    Eyes: Negative.    Respiratory: Negative.    Cardiovascular: Negative.    Gastrointestinal: Negative.    Genitourinary: Negative.    Musculoskeletal: Negative.    Skin: Negative.    Neurological: Negative.          PHYSICAL EXAM  VITAL SIGNS: BP (!) 158/100 (BP Location: Right arm, Patient Position: Sitting, BP Method: Medium (Automatic))   Pulse 100   Temp 97.7 °F (36.5 °C) (Oral)   Resp 12   Ht 5' 10" (1.778 m)   Wt 95.6 kg (210 lb 12.2 oz)   SpO2 98%   BMI 30.24 kg/m²   GEN: Well developed, Well nourished, No acute distress.  HENT: Normocephalic, Atraumatic, Bilateral external ears normal, Nose normal, Oropharynx moist, No oral exudates.   Eyes: PERRL, EOMI, Conjunctiva normal, No discharge.   Neck: Supple, No tenderness.  Lymphatic: No cervical or supraclavicular lymphadenopathy noted.   Cardiovascular: Normal heart rate, Normal rhythm, No murmurs, No rubs, No gallops.   Thorax & Lungs: Normal breath sounds, No respiratory distress, No wheezing.  Abdomen: Soft, No tenderness, Bowel sounds normal.  Genital:deferred  Skin: Warm, Dry, No erythema, No rash.   Extremities: No edema, No tenderness.       ASSESSMENT/PLAN    Marvin was seen today for hospital followup.    Diagnoses and all orders for this visit:    Visit for [facility] Shared Performance check  -     CBC auto differential; Future  -     Comprehensive metabolic panel; Future  -     Lipid panel; Future  -     Hemoglobin A1c; Future  -     Microalbumin/creatinine urine ratio; Future  -     Urinalysis; Future    Essential hypertension    Hyperlipidemia, unspecified hyperlipidemia " type    DM type 2 with diabetic peripheral neuropathy  -     Ambulatory referral to Endocrinology  -     metFORMIN (GLUCOPHAGE) 1000 MG tablet; Take 1 tablet (1,000 mg total) by mouth 2 (two) times daily with meals.  -     insulin glargine (LANTUS SOLOSTAR U-100 INSULIN) 100 unit/mL (3 mL) InPn pen; Inject 40 Units into the skin 2 (two) times daily.  -     insulin aspart U-100 (NOVOLOG) 100 unit/mL InPn pen; Inject 12 Units into the skin 3 (three) times daily with meals.    Statin myopathy        FOLLOW UP: 3 months or sooner if needed    Rubén Davis MD

## 2018-07-27 NOTE — PROGRESS NOTES
Routine Office Visit    Patient Name: Marvin Ray    : 1960  MRN: [id number]    Subjective:  Marvin is a 58 y.o. male who presents today for:    1. Sinus infection  Patient presenting today for follow up of a sinus infection treated by urgent care.  Feeling better after starting doxycyline.  He has not however been taking his insulin and blood sugars are staying above 300mg/dl.  He states he has been out of his medication for >300mg/dl.  There has been no fevers, chills, numbness, tingling, weakness, chest pain, or shortness of breath.      Past Medical History  Past Medical History:   Diagnosis Date    Arthritis     Cataract     Diabetes mellitus     Diabetic retinopathy     Glaucoma     Hyperlipemia     Hypertension        Past Surgical History  Past Surgical History:   Procedure Laterality Date    AHMED GLAUCOMA IMPLANT Left     DONE AT [facility]    BAERVELDT GLAUCOMA IMPLANT Left     WITH CATARACT EXTRACTION//DONE AT [facility]    CATARACT EXTRACTION W/  INTRAOCULAR LENS IMPLANT Left     WITH BAERVEDT//DONE AT [facility]    CB DESTRUCTION WITH CYCLO G6 Left 02/15/2017        CYST REMOVAL      Right foot surgery  10/2014    TOE AMPUTATION      TONSILLECTOMY         Family History  Family History   Problem Relation Age of Onset    Diabetes Mother     Heart disease Brother     No Known Problems Father     No Known Problems Sister     No Known Problems Maternal Aunt     No Known Problems Maternal Uncle     No Known Problems Paternal Aunt     No Known Problems Paternal Uncle     No Known Problems Maternal Grandmother     No Known Problems Maternal Grandfather     No Known Problems Paternal Grandmother     No Known Problems Paternal Grandfather     Anemia Neg Hx     Arrhythmia Neg Hx     Asthma Neg Hx     Clotting disorder Neg Hx     Fainting Neg Hx     Heart attack Neg Hx     Heart failure Neg Hx     Hyperlipidemia Neg Hx     Hypertension Neg Hx      Stroke Neg Hx     Atrial Septal Defect Neg Hx     Amblyopia Neg Hx     Blindness Neg Hx     Glaucoma Neg Hx     Macular degeneration Neg Hx     Retinal detachment Neg Hx     Strabismus Neg Hx        Social History  Social History     Social History    Marital status: Legally      Spouse name: N/A    Number of children: N/A    Years of education: 14     Occupational History    Not on file.     Social History Main Topics    Smoking status: Former Smoker     Packs/day: 1.00     Years: 3.00     Quit date: 7/11/1984    Smokeless tobacco: Never Used    Alcohol use 0.6 oz/week     1 Cans of beer per week      Comment: Occasional    Drug use: No    Sexual activity: Not Currently     Other Topics Concern    Not on file     Social History Narrative    No narrative on file       Current Medications  Current Outpatient Prescriptions on File Prior to Visit   Medication Sig Dispense Refill    allopurinol (ZYLOPRIM) 100 MG tablet Take 2 tablets (200 mg total) by mouth once daily. 180 tablet 3    benazepril (LOTENSIN) 40 MG tablet TAKE 1 TABLET(40 MG) BY MOUTH TWICE DAILY 60 tablet 0    brimonidine 0.1% (ALPHAGAN P) 0.1 % Drop Place 1 drop into both eyes 3 (three) times daily. 15 mL 12    coenzyme Q10 100 mg capsule Take 100 mg by mouth every morning.      dorzolamide-timolol 2-0.5% (COSOPT) 22.3-6.8 mg/mL ophthalmic solution Place 1 drop into both eyes 3 (three) times daily. 10 mL 12    fish oil-omega-3 fatty acids 300-1,000 mg capsule Take 2 capsules (2 g total) by mouth 2 (two) times daily. (Patient taking differently: Take 1 g by mouth 2 (two) times daily. ) 120 capsule 5    MULTIVIT,THER IRON,CA,FA & MIN (MULTIVITAMIN) Tab Take 1 tablet by mouth every morning.       promethazine-dextromethorphan (PROMETHAZINE-DM) 6.25-15 mg/5 mL Syrp Take 5 mLs by mouth nightly as needed. 120 mL 0    doxycycline (VIBRAMYCIN) 100 MG Cap Take 1 capsule (100 mg total) by mouth 2 (two) times daily. for 10 days  "20 capsule 0    nitroGLYCERIN (NITROSTAT) 0.4 MG SL tablet Place 1 tablet (0.4 mg total) under the tongue every 5 (five) minutes as needed for Chest pain (If CP persists go to ER. If taking NTG notify MD.). 20 tablet 1    pen needle, diabetic 31 gauge x 1/4" Ndle Use as directed 100 each 11    [DISCONTINUED] insulin aspart (NOVOLOG) 100 unit/mL InPn pen Inject 20 Units into the skin 3 (three) times daily with meals. (Patient taking differently: Inject 20 Units into the skin 3 (three) times daily with meals. Takes 1-3 times per day depending on how often he eats) 2 Box 11    [DISCONTINUED] insulin glargine (LANTUS SOLOSTAR) 100 unit/mL (3 mL) InPn pen Inject 80 Units into the skin every evening. (Patient taking differently: Inject 40 Units into the skin 2 (two) times daily. ) 3 Box 11    [DISCONTINUED] metFORMIN (GLUCOPHAGE) 1000 MG tablet TAKE 1 TABLET BY MOUTH TWICE DAILY WITH MEALS 60 tablet 0     No current facility-administered medications on file prior to visit.        Allergies   Review of patient's allergies indicates:   Allergen Reactions    Statins-hmg-coa reductase inhibitors      Generalized Pain    Penicillins Rash       Review of Systems (Pertinent positives)  Review of Systems   Constitutional: Negative.    HENT: Negative.    Eyes: Negative.    Respiratory: Negative.    Cardiovascular: Negative.    Gastrointestinal: Negative.    Genitourinary: Negative.    Musculoskeletal: Negative.    Skin: Negative.    Neurological: Negative.          BP (!) 158/100 (BP Location: Right arm, Patient Position: Sitting, BP Method: Medium (Automatic))   Pulse 100   Temp 97.7 °F (36.5 °C) (Oral)   Resp 12   Ht 5' 10" (1.778 m)   Wt 95.6 kg (210 lb 12.2 oz)   SpO2 98%   BMI 30.24 kg/m²     GENERAL APPEARANCE: in no apparent distress and well developed and well nourished  HEENT: PERRL, EOMI, Sclera clear, anicteric, Oropharynx clear, no lesions, Neck supple with midline trachea  NECK: normal, supple, no " adenopathy, thyroid normal in size  RESPIRATORY: appears well, vitals normal, no respiratory distress, acyanotic, normal RR, chest clear, no wheezing, crepitations, rhonchi, normal symmetric air entry  HEART: regular rate and rhythm, S1, S2 normal, no murmur, click, rub or gallop.    ABDOMEN: abdomen is soft without tenderness, no masses, no hernias, no organomegaly, no rebound, no guarding. Suprapubic tenderness absent. No CVA tenderness.  NEUROLOGIC: normal without focal findings, CN II-XII are intact.   SKIN: no rashes, no wounds, no other lesions  PSYCH: Alert, oriented x 3, thought content appropriate, speech normal, pleasant and cooperative, good eye contact, well groomed    Assessment/Plan:  Marvin Ray is a 58 y.o. male who presents today for :    Marvin was seen today for hospital followup.    Diagnoses and all orders for this visit:      Chronic maxillary sinusitis    1.  He finishes abx today  2.  Sinusitis has resolved  3.  Follow up as needed    Rubén Davis MD

## 2018-07-31 NOTE — PROGRESS NOTES
Please let patient know that his labs are way too high.  Also check to see if he got in to see Sheryl and if he is taking his Benazapril.  He has protein in his urine likely from diabetes and needs to be on a medication like benazapril.    Thanks,  Dr. Davis

## 2018-08-02 ENCOUNTER — TELEPHONE (OUTPATIENT)
Dept: FAMILY MEDICINE | Facility: CLINIC | Age: 58
End: 2018-08-02

## 2018-08-02 NOTE — TELEPHONE ENCOUNTER
----- Message from Rubén Davis MD sent at 7/31/2018  8:37 AM CDT -----  Please let patient know that his labs are way too high.  Also check to see if he got in to see Sheryl and if he is taking his Benazapril.  He has protein in his urine likely from diabetes and needs to be on a medication like benazapril.    Thanks,  Dr. Davis

## 2018-08-08 ENCOUNTER — TELEPHONE (OUTPATIENT)
Dept: ENDOCRINOLOGY | Facility: CLINIC | Age: 58
End: 2018-08-08

## 2018-08-08 NOTE — PROGRESS NOTES
CC: This 58 y.o. White male  is here for evaluation of  T2DM along with comorbidities indicated in the Visit Diagnosis section of this encounter.    HPI: Marvin Ray was diagnosed with T2DM in his late 20s. Pt was initially diet controlled, then required oral medications, then eventually required insulin.     Last seen by Dr. Mercedes in 2016 for the 1st time. New to me.     Latest A1c high because he couldn't afford insulin (Lantus ~$600). States his insurance plan is very expensive this and he makes a low salary. Also has a deductible of at least 1k.     He was able to get a supply of Lantus insulin from a friend and he purchased Novolog. Started Lantus 40 units once daily and Novolog 15 units ac a week.     BP is high today. Not sure if he took his benazepril last night.     LAST DIABETES EDUCATION: years ago     PRESCRIBED DIABETES MEDICATIONS: metformin 1000 mg bid, insulin as above   Misses medication doses - No    DM COMPLICATIONS: nephropathy, retinopathy and peripheral neuropathy  toe amputations (2/2 infection)    SIGNIFICANT DIABETES MED HISTORY:   Constipation on Onglyza     SELF MONITORING BLOOD GLUCOSE: Checks blood glucose at home 3x/day for the last week. predinner BG yesterday was 102; BGs in the last week have been mostly in the 100s with some in the 200s.     HYPOGLYCEMIC EPISODES: felt weak last night - not sure if it was the heat or BG of 102.      CURRENT DIET: drinks water and black tea with lemon. Eats 2 meals/day. Skips breakfast. Believes his diet is healthy, tries to watch carbs.   First meal is 10 am and 2nd meal is 4-7 pm. -- he is able to leave work and eat meals at home since he lives 2 blocks away. 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     Diet recall: supper was leftover taco, boiled cabbage. Ice. Lunch was hamburger steak with small serving of mashed potatoes, green beans, 1/2 slice of toast.     CURRENT EXERCISE:  Works out on [employer] 2-3x/week, about 12 -  "30 minutes each time     SOCIAL: shifts can be as early as 4 am or late as 10 pm but shifts are very variable.       BP (!) 147/93   Pulse 107   Ht 5' 10" (1.778 m)   Wt 99 kg (218 lb 4.1 oz)   BMI 31.32 kg/m²       ROS:   CONSTITUTIONAL: Appetite good, denies fatigue  EYES: + visual disturbances  RESPIRATORY: No shortness of breath or cough  CARDIAC: No chest pain or palpitations  GI: No nausea, vomiting, or diarrhea  OTHER: n/a    PHYSICAL EXAM:  GENERAL: Well developed, well nourished. No acute distress.   PSYCH: AAOx3, appropriate mood and affect, conversant, well-groomed. Judgement and insight good.   NEURO: Cranial nerves grossly intact. Speech clear, no tremor.   CHEST: Respirations even and unlabored. CTA bilaterally.  CARDIOVASCULAR: Regular rate and rhythm. No bruits. No murmur. Trace pitting ble edema.   MS: Gait steady. No clubbing.   SKIN: Normal skin turgor. Skin warm and dry. No areas of breakdown. No acanthosis nigricans. Shiny, atrophic skin to BLE.         Hemoglobin A1C   Date Value Ref Range Status   07/27/2018 >14.0 (H) 4.0 - 5.6 % Final     Comment:     ADA Screening Guidelines:  5.7-6.4%  Consistent with prediabetes  >or=6.5%  Consistent with diabetes  High levels of fetal hemoglobin interfere with the HbA1C  assay. Heterozygous hemoglobin variants (HbS, HgC, etc)do  not significantly interfere with this assay.   However, presence of multiple variants may affect accuracy.     08/25/2016 9.0 (H) 4.5 - 6.2 % Final     Comment:     According to ADA guidelines, hemoglobin A1C <7.0% represents  optimal control in non-pregnant diabetic patients.  Different  metrics may apply to specific populations.   Standards of Medical Care in Diabetes - 2016.  For the purpose of screening for the presence of diabetes:  <5.7%     Consistent with the absence of diabetes  5.7-6.4%  Consistent with increasing risk for diabetes   (prediabetes)  >or=6.5%  Consistent with diabetes  Currently no consensus exists for " use of hemoglobin A1C  for diagnosis of diabetes for children.     05/06/2016 8.7 (H) 4.5 - 6.2 % Final           Chemistry        Component Value Date/Time     (L) 07/27/2018 0820    K 5.0 07/27/2018 0820    CL 98 07/27/2018 0820    CO2 22 (L) 07/27/2018 0820    BUN 31 (H) 07/27/2018 0820    CREATININE 1.3 07/27/2018 0820     (H) 07/27/2018 0820        Component Value Date/Time    CALCIUM 9.7 07/27/2018 0820    ALKPHOS 134 07/27/2018 0820    AST 27 07/27/2018 0820    ALT 28 07/27/2018 0820    BILITOT 0.9 07/27/2018 0820    ESTGFRAFRICA >60 07/27/2018 0820    EGFRNONAA 59 (A) 07/27/2018 0820          Lab Results   Component Value Date    LDLCALC 105.4 07/27/2018        Ref. Range 7/27/2018 08:20   Cholesterol Latest Ref Range: 120 - 199 mg/dL 151   HDL Latest Ref Range: 40 - 75 mg/dL 29 (L)   LDL Cholesterol Latest Ref Range: 63.0 - 159.0 mg/dL 105.4   Total Cholesterol/HDL Ratio Latest Ref Range: 2.0 - 5.0  5.2 (H)   Triglycerides Latest Ref Range: 30 - 150 mg/dL 83     Lab Results   Component Value Date    MICALBCREAT 4564.0 (H) 07/27/2018           STANDARDS of CARE:        ASA:               Last eye exam:       ASSESSMENT and PLAN:    A1C GOAL: < 7 %     1. Uncontrolled type 2 diabetes mellitus with complication, with long-term current use of insulin  Discussed progression of DM disease, long term complications, and tx options. Reviewed A1c and BG goals.     Will obtain c-peptide today and if positive, will start Soliqua, cost permitting. If pt is not making endogenous insulin, will switch to Novolin 70/30 bid.       C-peptide    Glucose, random   2. Essential hypertension  Reinforced adherence to medications.    3. Retinopathy  Improve glycemic control.      4. Neuropathy  Improve glycemic control.      5. CKD (chronic kidney disease) stage 3, GFR 30-59 ml/min  Improve glycemic control.   Continue ACEi              Orders Placed This Encounter   Procedures    C-peptide     Standing Status:    Future     Number of Occurrences:   1     Standing Expiration Date:   10/8/2019    Glucose, random     Standing Status:   Future     Number of Occurrences:   1     Standing Expiration Date:   10/8/2019        No Follow-up on file.     Thank you very much for allowing me to participate in Marvin Ray's care.

## 2018-08-09 ENCOUNTER — LAB VISIT (OUTPATIENT)
Dept: LAB | Facility: HOSPITAL | Age: 58
End: 2018-08-09
Attending: NURSE PRACTITIONER
Payer: COMMERCIAL

## 2018-08-09 ENCOUNTER — OFFICE VISIT (OUTPATIENT)
Dept: ENDOCRINOLOGY | Facility: CLINIC | Age: 58
End: 2018-08-09
Payer: COMMERCIAL

## 2018-08-09 VITALS
HEART RATE: 108 BPM | HEIGHT: 70 IN | DIASTOLIC BLOOD PRESSURE: 101 MMHG | WEIGHT: 218.25 LBS | BODY MASS INDEX: 31.25 KG/M2 | SYSTOLIC BLOOD PRESSURE: 152 MMHG

## 2018-08-09 DIAGNOSIS — H35.00 RETINOPATHY: ICD-10-CM

## 2018-08-09 DIAGNOSIS — I10 ESSENTIAL HYPERTENSION: ICD-10-CM

## 2018-08-09 DIAGNOSIS — N18.30 CKD (CHRONIC KIDNEY DISEASE) STAGE 3, GFR 30-59 ML/MIN: ICD-10-CM

## 2018-08-09 DIAGNOSIS — G62.9 NEUROPATHY: ICD-10-CM

## 2018-08-09 LAB — GLUCOSE SERPL-MCNC: 149 MG/DL

## 2018-08-09 PROCEDURE — 84681 ASSAY OF C-PEPTIDE: CPT

## 2018-08-09 PROCEDURE — 3077F SYST BP >= 140 MM HG: CPT | Mod: CPTII,S$GLB,, | Performed by: NURSE PRACTITIONER

## 2018-08-09 PROCEDURE — 99214 OFFICE O/P EST MOD 30 MIN: CPT | Mod: S$GLB,,, | Performed by: NURSE PRACTITIONER

## 2018-08-09 PROCEDURE — 99999 PR PBB SHADOW E&M-EST. PATIENT-LVL IV: CPT | Mod: PBBFAC,,, | Performed by: NURSE PRACTITIONER

## 2018-08-09 PROCEDURE — 82947 ASSAY GLUCOSE BLOOD QUANT: CPT

## 2018-08-09 PROCEDURE — 36415 COLL VENOUS BLD VENIPUNCTURE: CPT | Mod: PN

## 2018-08-09 PROCEDURE — 3008F BODY MASS INDEX DOCD: CPT | Mod: CPTII,S$GLB,, | Performed by: NURSE PRACTITIONER

## 2018-08-09 PROCEDURE — 3080F DIAST BP >= 90 MM HG: CPT | Mod: CPTII,S$GLB,, | Performed by: NURSE PRACTITIONER

## 2018-08-09 RX ORDER — ASPIRIN 325 MG
325 TABLET ORAL DAILY
Status: ON HOLD | COMMUNITY
End: 2020-03-16

## 2018-08-09 NOTE — LETTER
August 9, 2018      Rubén Davis MD  [address] [facility] [address]           [place] - Endo/Diabetes  [address] [facility], [address]  [facility] [address]  Phone: [phone]  Fax: [phone]          Patient: Marvin Ray   MR Number: [id number]   YOB: 1960   Date of Visit: 8/9/2018       Dear Dr. Rubén ANDRADE Page:    Thank you for referring Marvin Ray to me for evaluation. Attached you will find relevant portions of my assessment and plan of care.    If you have questions, please do not hesitate to call me. I look forward to following Marvin Ray along with you.    Sincerely,    Sheryl Madison NP    Enclosure  CC:  No Recipients    If you would like to receive this communication electronically, please contact [email or web address] or [phone] to request more information on [employer] access.    For providers and/or their staff who would like to refer a patient to [facility], please contact us through our one-stop-shop provider referral line, [facility], at [phone].    If you feel you have received this communication in error or would no longer like to receive these types of communications, please e-mail [email or web address]

## 2018-08-09 NOTE — PATIENT INSTRUCTIONS
A1C goal: <7%  Fasting/premeal blood glucose goal:   2 hour post-meal blood glucose goal: less than 180    Labs today.

## 2018-08-10 ENCOUNTER — TELEPHONE (OUTPATIENT)
Dept: ENDOCRINOLOGY | Facility: CLINIC | Age: 58
End: 2018-08-10

## 2018-08-10 LAB — C PEPTIDE SERPL-MCNC: 2.23 NG/ML

## 2018-08-10 NOTE — TELEPHONE ENCOUNTER
Called pharmacy and stated that soliqua is not formulary and to try either one of these meds 1st to see if it works or it doesn't work for pt before trying soliqua. Theses meds are levemir, lantus, trulicity, victoza, byetta, bydureon. Sent message to provider.

## 2018-08-10 NOTE — TELEPHONE ENCOUNTER
Advised pt that once he picks up the discount card and activates it, Soliqua will be covered (he will need to tell the pharmacy to run the card like a secondary insurance). Start at Soliqua 30  Units q week and increase by 4 units weekly if BG > 150.   He voiced understanding and will pick it up next week.

## 2018-08-24 DIAGNOSIS — E11.42 DM TYPE 2 WITH DIABETIC PERIPHERAL NEUROPATHY: ICD-10-CM

## 2018-08-24 RX ORDER — METFORMIN HYDROCHLORIDE 1000 MG/1
TABLET ORAL
Qty: 60 TABLET | Refills: 0 | Status: SHIPPED | OUTPATIENT
Start: 2018-08-24 | End: 2018-10-10 | Stop reason: ALTCHOICE

## 2018-08-29 ENCOUNTER — TELEPHONE (OUTPATIENT)
Dept: FAMILY MEDICINE | Facility: CLINIC | Age: 58
End: 2018-08-29

## 2018-08-29 NOTE — TELEPHONE ENCOUNTER
----- Message from Sujit Cabral sent at 8/29/2018  2:29 PM CDT -----  Mr. Ray is scheduled for cataract surgery with Dr. Najera on September 26, 2018.  Pt will need medical clearance for this procedure.  Surgery is scheduled with local/MAC anesthesia.  Pt can be reached at: [phone]. Please call if you have any questions.    Thanks,   Sujit Cabral  Surgery Coordinator  Ophthalmology  [facility] [[id number]]  [id number]

## 2018-09-05 ENCOUNTER — OFFICE VISIT (OUTPATIENT)
Dept: FAMILY MEDICINE | Facility: CLINIC | Age: 58
End: 2018-09-05
Payer: COMMERCIAL

## 2018-09-05 VITALS
HEART RATE: 99 BPM | HEIGHT: 70 IN | TEMPERATURE: 98 F | RESPIRATION RATE: 12 BRPM | SYSTOLIC BLOOD PRESSURE: 154 MMHG | WEIGHT: 226 LBS | DIASTOLIC BLOOD PRESSURE: 102 MMHG | BODY MASS INDEX: 32.35 KG/M2 | OXYGEN SATURATION: 98 %

## 2018-09-05 DIAGNOSIS — I10 ESSENTIAL HYPERTENSION: ICD-10-CM

## 2018-09-05 DIAGNOSIS — H26.9 CATARACT OF RIGHT EYE, UNSPECIFIED CATARACT TYPE: Primary | ICD-10-CM

## 2018-09-05 DIAGNOSIS — Z01.818 PRE-OP EXAM: ICD-10-CM

## 2018-09-05 LAB — GLUCOSE SERPL-MCNC: 152 MG/DL (ref 70–110)

## 2018-09-05 PROCEDURE — 3008F BODY MASS INDEX DOCD: CPT | Mod: CPTII,S$GLB,, | Performed by: FAMILY MEDICINE

## 2018-09-05 PROCEDURE — 3080F DIAST BP >= 90 MM HG: CPT | Mod: CPTII,S$GLB,, | Performed by: FAMILY MEDICINE

## 2018-09-05 PROCEDURE — 82948 REAGENT STRIP/BLOOD GLUCOSE: CPT | Mod: S$GLB,,, | Performed by: FAMILY MEDICINE

## 2018-09-05 PROCEDURE — 3077F SYST BP >= 140 MM HG: CPT | Mod: CPTII,S$GLB,, | Performed by: FAMILY MEDICINE

## 2018-09-05 PROCEDURE — 99214 OFFICE O/P EST MOD 30 MIN: CPT | Mod: 25,S$GLB,, | Performed by: FAMILY MEDICINE

## 2018-09-05 PROCEDURE — 99999 PR PBB SHADOW E&M-EST. PATIENT-LVL III: CPT | Mod: PBBFAC,,, | Performed by: FAMILY MEDICINE

## 2018-09-05 RX ORDER — AMLODIPINE BESYLATE 5 MG/1
5 TABLET ORAL DAILY
Qty: 30 TABLET | Refills: 0 | Status: SHIPPED | OUTPATIENT
Start: 2018-09-05 | End: 2018-10-08 | Stop reason: SDUPTHER

## 2018-09-05 NOTE — PROGRESS NOTES
Routine Office Visit    Patient Name: Marvin Ray    : 1960  MRN: [id number]    Subjective:  Marvin is a 58 y.o. male who presents today for:    1. Pre op  Patient presenting today for pre-op clearance for cataract removal.  He has been taking all of his blood pressure and diabetes medications as prescribed.  He denies any chest pain, shortness of breath, cough, fever, chills, blurred vision, or palpitations.  Blood sugars have been better controlled since seeing diabetes NP.  He states that all readings have been below 200mg/dl.  He has not had polydipsia or polyuria.      Past Medical History  Past Medical History:   Diagnosis Date    Arthritis     Cataract     Diabetes mellitus     Diabetic retinopathy     Glaucoma     Hyperlipemia     Hypertension        Past Surgical History  Past Surgical History:   Procedure Laterality Date    AHMED GLAUCOMA IMPLANT Left     DONE AT [facility]    BAERVELDT GLAUCOMA IMPLANT Left     WITH CATARACT EXTRACTION//DONE AT [facility]    CATARACT EXTRACTION W/  INTRAOCULAR LENS IMPLANT Left     WITH BAERVEDT//DONE AT [facility]    CB DESTRUCTION WITH CYCLO G6 Left 02/15/2017        CYST REMOVAL      Right foot surgery  10/2014    TOE AMPUTATION      TONSILLECTOMY         Family History  Family History   Problem Relation Age of Onset    Diabetes Mother     Heart disease Brother     No Known Problems Father     No Known Problems Sister     No Known Problems Maternal Aunt     No Known Problems Maternal Uncle     No Known Problems Paternal Aunt     No Known Problems Paternal Uncle     No Known Problems Maternal Grandmother     No Known Problems Maternal Grandfather     No Known Problems Paternal Grandmother     No Known Problems Paternal Grandfather     Anemia Neg Hx     Arrhythmia Neg Hx     Asthma Neg Hx     Clotting disorder Neg Hx     Fainting Neg Hx     Heart attack Neg Hx     Heart failure Neg Hx     Hyperlipidemia Neg  Hx     Hypertension Neg Hx     Stroke Neg Hx     Atrial Septal Defect Neg Hx     Amblyopia Neg Hx     Blindness Neg Hx     Glaucoma Neg Hx     Macular degeneration Neg Hx     Retinal detachment Neg Hx     Strabismus Neg Hx        Social History  Social History     Socioeconomic History    Marital status: Legally      Spouse name: Not on file    Number of children: Not on file    Years of education: 14    Highest education level: Not on file   Social Needs    Financial resource strain: Not on file    Food insecurity - worry: Not on file    Food insecurity - inability: Not on file    Transportation needs - medical: Not on file    Transportation needs - non-medical: Not on file   Occupational History    Not on file   Tobacco Use    Smoking status: Former Smoker     Packs/day: 1.00     Years: 3.00     Pack years: 3.00     Last attempt to quit: 1984     Years since quittin.1    Smokeless tobacco: Never Used   Substance and Sexual Activity    Alcohol use: Yes     Alcohol/week: 0.6 oz     Types: 1 Cans of beer per week     Comment: Occasional    Drug use: No    Sexual activity: Not Currently   Other Topics Concern    Not on file   Social History Narrative    Not on file       Current Medications  Current Outpatient Medications on File Prior to Visit   Medication Sig Dispense Refill    allopurinol (ZYLOPRIM) 100 MG tablet Take 2 tablets (200 mg total) by mouth once daily. 180 tablet 3    aspirin 81 MG Chew Take 81 mg by mouth once daily.      benazepril (LOTENSIN) 40 MG tablet TAKE 1 TABLET(40 MG) BY MOUTH TWICE DAILY 60 tablet 0    brimonidine 0.1% (ALPHAGAN P) 0.1 % Drop Place 1 drop into both eyes 3 (three) times daily. 15 mL 12    coenzyme Q10 100 mg capsule Take 100 mg by mouth every morning.      dorzolamide-timolol 2-0.5% (COSOPT) 22.3-6.8 mg/mL ophthalmic solution Place 1 drop into both eyes 3 (three) times daily. 10 mL 12    fish oil-omega-3 fatty acids 300-1,000  "mg capsule Take 2 capsules (2 g total) by mouth 2 (two) times daily. (Patient taking differently: Take 1 g by mouth 2 (two) times daily. ) 120 capsule 5    insulin glargine-lixisenatide (SOLIQUA 100/33) 100 unit-33 mcg/mL InPn Inject 30 Units into the skin once daily. 5 Syringe 0    metFORMIN (GLUCOPHAGE) 1000 MG tablet TAKE 1 TABLET BY MOUTH TWICE DAILY WITH MEALS 60 tablet 0    MULTIVIT,THER IRON,CA,FA & MIN (MULTIVITAMIN) Tab Take 1 tablet by mouth every morning.       pen needle, diabetic 31 gauge x 1/4" Ndle Use as directed 100 each 11    insulin aspart U-100 (NOVOLOG) 100 unit/mL InPn pen Inject 12 Units into the skin 3 (three) times daily with meals. 2 Box 11    nitroGLYCERIN (NITROSTAT) 0.4 MG SL tablet Place 1 tablet (0.4 mg total) under the tongue every 5 (five) minutes as needed for Chest pain (If CP persists go to ER. If taking NTG notify MD.). 20 tablet 1     No current facility-administered medications on file prior to visit.        Allergies   Review of patient's allergies indicates:   Allergen Reactions    Statins-hmg-coa reductase inhibitors      Generalized Pain    Onglyza [saxagliptin]     Penicillins Rash       Review of Systems (Pertinent positives)  Review of Systems   Constitutional: Negative.    HENT: Negative.    Eyes: Negative.    Respiratory: Negative.    Cardiovascular: Negative.    Gastrointestinal: Negative.    Musculoskeletal: Negative.    Skin: Negative.    Neurological: Negative.          BP (!) 154/102 (BP Location: Right arm, Patient Position: Sitting, BP Method: Medium (Manual))   Pulse 99   Temp 98.3 °F (36.8 °C) (Oral)   Resp 12   Ht 5' 10" (1.778 m)   Wt 102.5 kg (225 lb 15.5 oz)   SpO2 98%   BMI 32.42 kg/m²     GENERAL APPEARANCE: in no apparent distress and well developed and well nourished  HEENT: PERRL, EOMI, Sclera clear, anicteric, Oropharynx clear, no lesions, Neck supple with midline trachea  NECK: normal, supple, no adenopathy, thyroid normal in " size  RESPIRATORY: appears well, vitals normal, no respiratory distress, acyanotic, normal RR, chest clear, no wheezing, crepitations, rhonchi, normal symmetric air entry  HEART: regular rate and rhythm, S1, S2 normal, no murmur, click, rub or gallop.    ABDOMEN: abdomen is soft without tenderness, no masses, no hernias, no organomegaly, no rebound, no guarding. Suprapubic tenderness absent. No CVA tenderness.  NEUROLOGIC: normal without focal findings, CN II-XII are intact.    SKIN: no rashes, no wounds, no other lesions  PSYCH: Alert, oriented x 3, thought content appropriate, speech normal, pleasant and cooperative, good eye contact, well groomed    Assessment/Plan:  Marvin Ray is a 58 y.o. male who presents today for :    Marvin was seen today for surgery clearance.    Diagnoses and all orders for this visit:    Cataract of right eye, unspecified cataract type    Pre-op exam    Essential hypertension  -     amLODIPine (NORVASC) 5 MG tablet; Take 1 tablet (5 mg total) by mouth once daily.    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  Random blood sugar 152mg/dl in office  2.  Start on norvasc 5mg daily as blood pressure not well controlled  3.  Follow up for nurse visit next week to check blood pressure  4.  Scheduled diabetes follow up with Sheryl Madison NP prior to leaving today  5.  If blood pressure and blood sugar get better controlled he will be low risk for low risk procedure of catarct removal      Rubén Davis MD

## 2018-09-07 ENCOUNTER — OFFICE VISIT (OUTPATIENT)
Dept: ENDOCRINOLOGY | Facility: CLINIC | Age: 58
End: 2018-09-07
Payer: COMMERCIAL

## 2018-09-07 VITALS
WEIGHT: 228.63 LBS | BODY MASS INDEX: 32.73 KG/M2 | DIASTOLIC BLOOD PRESSURE: 90 MMHG | HEART RATE: 95 BPM | RESPIRATION RATE: 18 BRPM | HEIGHT: 70 IN | SYSTOLIC BLOOD PRESSURE: 145 MMHG

## 2018-09-07 DIAGNOSIS — I10 ESSENTIAL HYPERTENSION: ICD-10-CM

## 2018-09-07 DIAGNOSIS — E78.5 HYPERLIPIDEMIA LDL GOAL <70: ICD-10-CM

## 2018-09-07 DIAGNOSIS — H35.00 RETINOPATHY: ICD-10-CM

## 2018-09-07 PROCEDURE — 99999 PR PBB SHADOW E&M-EST. PATIENT-LVL IV: CPT | Mod: PBBFAC,,, | Performed by: NURSE PRACTITIONER

## 2018-09-07 PROCEDURE — 3080F DIAST BP >= 90 MM HG: CPT | Mod: CPTII,S$GLB,, | Performed by: NURSE PRACTITIONER

## 2018-09-07 PROCEDURE — 3077F SYST BP >= 140 MM HG: CPT | Mod: CPTII,S$GLB,, | Performed by: NURSE PRACTITIONER

## 2018-09-07 PROCEDURE — 99214 OFFICE O/P EST MOD 30 MIN: CPT | Mod: S$GLB,,, | Performed by: NURSE PRACTITIONER

## 2018-09-07 PROCEDURE — 3008F BODY MASS INDEX DOCD: CPT | Mod: CPTII,S$GLB,, | Performed by: NURSE PRACTITIONER

## 2018-09-07 RX ORDER — EZETIMIBE 10 MG/1
10 TABLET ORAL DAILY
Qty: 30 TABLET | Refills: 2 | Status: SHIPPED | OUTPATIENT
Start: 2018-09-07 | End: 2019-03-16 | Stop reason: SDUPTHER

## 2018-09-07 NOTE — PROGRESS NOTES
CC: This 58 y.o. White male  is here for evaluation of  T2DM along with comorbidities indicated in the Visit Diagnosis section of this encounter.    HPI: Marvin aRy was diagnosed with T2DM in his late 20s. Pt was initially diet controlled, then required oral medications, then eventually required insulin.     Initial visit on 8/9/18  Last seen by Dr. Mercedes in 2016 for the 1st time. New to me.   Latest A1c high because he couldn't afford insulin (Lantus ~$600). States his insurance plan is very expensive this and he makes a low salary. Also has a deductible of at least 1k.   He was able to get a supply of Lantus insulin from a friend and he purchased Novolog. Started Lantus 40 units once daily and Novolog 15 units ac a week.   BP is high today. Not sure if he took his benazepril last night.   Plan Will obtain c-peptide today and if positive, will start Soliqua, cost permitting. If pt is not making endogenous insulin, will switch to Novolin 70/30 bid.     Interval history  He started Soliqua and tolerating it well without nausea.       LAST DIABETES EDUCATION: years ago     PRESCRIBED DIABETES MEDICATIONS: metformin 1000 mg bid, Soliqua 34 units every morning   Misses medication doses - No    DM COMPLICATIONS: nephropathy, retinopathy and peripheral neuropathy  toe amputations (2/2 infection)    SIGNIFICANT DIABETES MED HISTORY:   Constipation on Onglyza     SELF MONITORING BLOOD GLUCOSE: Checks blood glucose at home 1x/day - before or after dinner, varies .     HYPOGLYCEMIC EPISODES: none      CURRENT DIET: drinks water and black tea with lemon. Eats 2 meals/day. Skips breakfast. Believes his diet is healthy, tries to watch carbs.   First meal is 10 am and 2nd meal is 4-7 pm. -- he is able to leave work and eat meals at home since he lives 2 blocks away.     CURRENT EXERCISE:  Works out on [employer] 2-3x/week, about 12 - 30 minutes each time     SOCIAL: shifts can be as early as 4 am or late as 10 pm but  "shifts are very variable.       BP (!) 145/90   Pulse 95   Resp 18   Ht 5' 10" (1.778 m)   Wt 103.7 kg (228 lb 9.9 oz)   BMI 32.80 kg/m²       ROS:   CONSTITUTIONAL: Appetite good, denies fatigue  EYES: + visual disturbances - needs cataract sx  RESPIRATORY: No shortness of breath or cough  CARDIAC: No chest pain or palpitations  GI: No nausea, vomiting, or diarrhea  OTHER: n/a    PHYSICAL EXAM:  GENERAL: Well developed, well nourished. No acute distress.   PSYCH: AAOx3, appropriate mood and affect, conversant, well-groomed. Judgement and insight good.   NEURO: Cranial nerves grossly intact. Speech clear, no tremor.   CHEST: Respirations even and unlabored.         Hemoglobin A1C   Date Value Ref Range Status   07/27/2018 >14.0 (H) 4.0 - 5.6 % Final     Comment:     ADA Screening Guidelines:  5.7-6.4%  Consistent with prediabetes  >or=6.5%  Consistent with diabetes  High levels of fetal hemoglobin interfere with the HbA1C  assay. Heterozygous hemoglobin variants (HbS, HgC, etc)do  not significantly interfere with this assay.   However, presence of multiple variants may affect accuracy.     08/25/2016 9.0 (H) 4.5 - 6.2 % Final     Comment:     According to ADA guidelines, hemoglobin A1C <7.0% represents  optimal control in non-pregnant diabetic patients.  Different  metrics may apply to specific populations.   Standards of Medical Care in Diabetes - 2016.  For the purpose of screening for the presence of diabetes:  <5.7%     Consistent with the absence of diabetes  5.7-6.4%  Consistent with increasing risk for diabetes   (prediabetes)  >or=6.5%  Consistent with diabetes  Currently no consensus exists for use of hemoglobin A1C  for diagnosis of diabetes for children.     05/06/2016 8.7 (H) 4.5 - 6.2 % Final           Chemistry        Component Value Date/Time     (L) 07/27/2018 0820    K 5.0 07/27/2018 0820    CL 98 07/27/2018 0820    CO2 22 (L) 07/27/2018 0820    BUN 31 (H) 07/27/2018 0820    CREATININE " 1.3 07/27/2018 0820     (H) 08/09/2018 0923        Component Value Date/Time    CALCIUM 9.7 07/27/2018 0820    ALKPHOS 134 07/27/2018 0820    AST 27 07/27/2018 0820    ALT 28 07/27/2018 0820    BILITOT 0.9 07/27/2018 0820    ESTGFRAFRICA >60 07/27/2018 0820    EGFRNONAA 59 (A) 07/27/2018 0820          Lab Results   Component Value Date    LDLCALC 105.4 07/27/2018        Ref. Range 7/27/2018 08:20   Cholesterol Latest Ref Range: 120 - 199 mg/dL 151   HDL Latest Ref Range: 40 - 75 mg/dL 29 (L)   LDL Cholesterol Latest Ref Range: 63.0 - 159.0 mg/dL 105.4   Total Cholesterol/HDL Ratio Latest Ref Range: 2.0 - 5.0  5.2 (H)   Triglycerides Latest Ref Range: 30 - 150 mg/dL 83     Lab Results   Component Value Date    MICALBCREAT 4564.0 (H) 07/27/2018           STANDARDS of CARE:        ASA:               Last eye exam:       ASSESSMENT and PLAN:    A1C GOAL: < 7 %     1. Uncontrolled type 2 diabetes mellitus with complication, with long-term current use of insulin  Continue current regimen.   Test bg 2x/day - fasting and again before/2hours post dinner.   rtc in 2 mo with labs prior.     Hemoglobin A1c   2. Hyperlipidemia LDL goal <70  ezetimibe (ZETIA) 10 mg tablet    Lipid panel   3. Retinopathy  Improve glycemic control   4. Essential hypertension  Amlodipine started yesterday. He has nurse BP check next week as well.        Orders Placed This Encounter   Procedures    Hemoglobin A1c     Standing Status:   Future     Standing Expiration Date:   11/6/2019    Lipid panel     Standing Status:   Future     Standing Expiration Date:   9/7/2019        Follow-up in about 2 months (around 11/7/2018).

## 2018-09-13 ENCOUNTER — CLINICAL SUPPORT (OUTPATIENT)
Dept: FAMILY MEDICINE | Facility: CLINIC | Age: 58
End: 2018-09-13
Payer: COMMERCIAL

## 2018-09-13 VITALS — HEART RATE: 88 BPM | SYSTOLIC BLOOD PRESSURE: 150 MMHG | DIASTOLIC BLOOD PRESSURE: 80 MMHG

## 2018-09-13 DIAGNOSIS — Z01.30 BLOOD PRESSURE CHECK: Primary | ICD-10-CM

## 2018-09-13 PROCEDURE — 99999 PR PBB SHADOW E&M-EST. PATIENT-LVL III: CPT | Mod: PBBFAC,,,

## 2018-09-13 NOTE — PROGRESS NOTES
Marvin Ray 58 y.o. male is here today for Blood Pressure check.     History of HTN yes.    Review of patient's allergies indicates:   Allergen Reactions    Statins-hmg-coa reductase inhibitors      Generalized Pain    Onglyza [saxagliptin]     Penicillins Rash     Creatinine   Date Value Ref Range Status   07/27/2018 1.3 0.5 - 1.4 mg/dL Final     Sodium   Date Value Ref Range Status   07/27/2018 131 (L) 136 - 145 mmol/L Final     Potassium   Date Value Ref Range Status   07/27/2018 5.0 3.5 - 5.1 mmol/L Final   ]  Patient verifies taking blood pressure medications on a regular basis at the same time of the day.     Current Outpatient Medications:     allopurinol (ZYLOPRIM) 100 MG tablet, Take 2 tablets (200 mg total) by mouth once daily., Disp: 180 tablet, Rfl: 3    amLODIPine (NORVASC) 5 MG tablet, Take 1 tablet (5 mg total) by mouth once daily., Disp: 30 tablet, Rfl: 0    aspirin 81 MG Chew, Take 81 mg by mouth once daily., Disp: , Rfl:     benazepril (LOTENSIN) 40 MG tablet, TAKE 1 TABLET(40 MG) BY MOUTH TWICE DAILY, Disp: 60 tablet, Rfl: 0    brimonidine 0.1% (ALPHAGAN P) 0.1 % Drop, Place 1 drop into both eyes 3 (three) times daily., Disp: 15 mL, Rfl: 12    coenzyme Q10 100 mg capsule, Take 100 mg by mouth every morning., Disp: , Rfl:     dorzolamide-timolol 2-0.5% (COSOPT) 22.3-6.8 mg/mL ophthalmic solution, Place 1 drop into both eyes 3 (three) times daily., Disp: 10 mL, Rfl: 12    ezetimibe (ZETIA) 10 mg tablet, Take 1 tablet (10 mg total) by mouth once daily., Disp: 30 tablet, Rfl: 2    fish oil-omega-3 fatty acids 300-1,000 mg capsule, Take 2 capsules (2 g total) by mouth 2 (two) times daily. (Patient taking differently: Take 1 g by mouth 2 (two) times daily. ), Disp: 120 capsule, Rfl: 5    insulin glargine-lixisenatide (SOLIQUA 100/33) 100 unit-33 mcg/mL InPn, Inject 30 Units into the skin once daily., Disp: 5 Syringe, Rfl: 0    metFORMIN (GLUCOPHAGE) 1000 MG tablet, TAKE 1 TABLET BY  "MOUTH TWICE DAILY WITH MEALS, Disp: 60 tablet, Rfl: 0    MULTIVIT,THER IRON,CA,FA & MIN (MULTIVITAMIN) Tab, Take 1 tablet by mouth every morning. , Disp: , Rfl:     pen needle, diabetic 31 gauge x 1/4" Ndle, Use as directed, Disp: 100 each, Rfl: 11    insulin aspart U-100 (NOVOLOG) 100 unit/mL InPn pen, Inject 12 Units into the skin 3 (three) times daily with meals., Disp: 2 Box, Rfl: 11    nitroGLYCERIN (NITROSTAT) 0.4 MG SL tablet, Place 1 tablet (0.4 mg total) under the tongue every 5 (five) minutes as needed for Chest pain (If CP persists go to ER. If taking NTG notify MD.)., Disp: 20 tablet, Rfl: 1     Does patient have record of home blood pressure readings -  no. Readings have been averaging - did not bring readings but states that they go up and down 110/65, 140/80. States that when he comes here, his blood pressure is always elevated because he's nervous.    Last dose of blood pressure medication was taken at 6 AM this morning    Patient is asymptomatic.     Complains of - no complaints    BP: (!) 150/80 , Pulse: 88 .    Blood pressure reading after 15 minutes was 130/80, Pulse 72 .    Dr. Davis notified.   "

## 2018-09-14 ENCOUNTER — OFFICE VISIT (OUTPATIENT)
Dept: OPHTHALMOLOGY | Facility: CLINIC | Age: 58
End: 2018-09-14
Payer: COMMERCIAL

## 2018-09-14 DIAGNOSIS — H40.52X3 NEOVASCULAR GLAUCOMA OF LEFT EYE, SEVERE STAGE: ICD-10-CM

## 2018-09-14 DIAGNOSIS — Z96.1 PSEUDOPHAKIA, LEFT EYE: ICD-10-CM

## 2018-09-14 DIAGNOSIS — Z96.89 HISTORY OF GLAUCOMA TUBE SHUNT PROCEDURE: ICD-10-CM

## 2018-09-14 DIAGNOSIS — H25.11 NUCLEAR SCLEROTIC CATARACT OF RIGHT EYE: Primary | ICD-10-CM

## 2018-09-14 DIAGNOSIS — E11.3511 DIABETIC MACULAR EDEMA OF RIGHT EYE WITH PROLIFERATIVE RETINOPATHY ASSOCIATED WITH TYPE 2 DIABETES MELLITUS: ICD-10-CM

## 2018-09-14 DIAGNOSIS — H35.373 EPIRETINAL MEMBRANE, BOTH EYES: ICD-10-CM

## 2018-09-14 DIAGNOSIS — E11.3512 DIABETIC MACULAR EDEMA OF LEFT EYE WITH PROLIFERATIVE RETINOPATHY ASSOCIATED WITH TYPE 2 DIABETES MELLITUS: ICD-10-CM

## 2018-09-14 DIAGNOSIS — H40.1112 PRIMARY OPEN ANGLE GLAUCOMA OF RIGHT EYE, MODERATE STAGE: ICD-10-CM

## 2018-09-14 PROCEDURE — 99999 PR PBB SHADOW E&M-EST. PATIENT-LVL II: CPT | Mod: PBBFAC,,, | Performed by: OPHTHALMOLOGY

## 2018-09-14 PROCEDURE — 92136 OPHTHALMIC BIOMETRY: CPT | Mod: 26,S$GLB,, | Performed by: OPHTHALMOLOGY

## 2018-09-14 PROCEDURE — 99499 UNLISTED E&M SERVICE: CPT | Mod: S$GLB,,, | Performed by: OPHTHALMOLOGY

## 2018-09-14 NOTE — Clinical Note
THIS MUTUAL PT OF OURS IS TO HAVE PHACO/IOL OD TOMORROW. HIS LAST AVASTIN WAS 7/3/2018. He is to return to you in 16 wks with OCT and possible more avastin. It is 11 weeks since his last avastin. Do you want me to give avastin at the end of the cataract surgery tomorrow, or just leave it and see how it goes ?

## 2018-09-15 RX ORDER — LIDOCAINE HYDROCHLORIDE 10 MG/ML
1 INJECTION, SOLUTION EPIDURAL; INFILTRATION; INTRACAUDAL; PERINEURAL ONCE
Status: CANCELLED | OUTPATIENT
Start: 2018-09-15 | End: 2018-09-15

## 2018-09-15 RX ORDER — PHENYLEPHRINE HYDROCHLORIDE 100 MG/ML
1 SOLUTION/ DROPS OPHTHALMIC
Status: CANCELLED | OUTPATIENT
Start: 2018-09-15

## 2018-09-15 RX ORDER — SODIUM CHLORIDE 9 MG/ML
INJECTION, SOLUTION INTRAVENOUS CONTINUOUS
Status: CANCELLED | OUTPATIENT
Start: 2018-09-15

## 2018-09-15 RX ORDER — TROPICAMIDE 10 MG/ML
1 SOLUTION/ DROPS OPHTHALMIC
Status: CANCELLED | OUTPATIENT
Start: 2018-09-15

## 2018-09-15 RX ORDER — MOXIFLOXACIN 5 MG/ML
1 SOLUTION/ DROPS OPHTHALMIC
Status: CANCELLED | OUTPATIENT
Start: 2018-09-15

## 2018-09-15 RX ORDER — KETOROLAC TROMETHAMINE 5 MG/ML
1 SOLUTION OPHTHALMIC
Status: CANCELLED | OUTPATIENT
Start: 2018-09-15

## 2018-09-15 NOTE — H&P
Subjective:       Patient ID: Marvin Ray is a 58 y.o. male.    Chief Complaint: Pre-op Exam    HPI  Review of Systems   Constitutional: Negative.    HENT: Negative.    Eyes: Positive for visual disturbance.   Respiratory: Negative.    Cardiovascular: Negative.    Gastrointestinal: Negative.    Endocrine: Negative.    Genitourinary: Negative.        Objective:      Physical Exam   Constitutional: He is oriented to person, place, and time. He appears well-nourished.   HENT:   Head: Normocephalic and atraumatic.   Cardiovascular: Normal rate.   Pulmonary/Chest: Effort normal.   Neurological: He is alert and oriented to person, place, and time.   Skin: Skin is warm and dry.   Psychiatric: He has a normal mood and affect.       Assessment:       1. Nuclear sclerotic cataract of right eye    2. Neovascular glaucoma of left eye, severe stage    3. Primary open angle glaucoma of right eye, moderate stage    4. Epiretinal membrane, both eyes    5. 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 Diabetic macular edema of right eye with proliferative retinopathy associated with type 2 diabetes mellitus    7. Diabetic macular edema of left eye with proliferative retinopathy associated with type 2 diabetes mellitus    8. Pseudophakia, left eye    9. History of glaucoma tube shunt procedure        Plan:       Phaco/IOL od - complex trypan blue / ? Laurie ring // ++ DME -  Gets avastin series - ok for surgery per Gasper

## 2018-09-15 NOTE — PROGRESS NOTES
HPI     DLS: 7/17/18    Pt here for Pre-Op phaco w/IOL OD (Surgery is 9/26/18)    Meds: Cosopt tid ou             Alphagan P tid ou     1. NVG OS   2. POAG OD   3. PDR with CSME OU   4. DME OU   5. NS OD   5. PCIOL OS   6. ERM OS    Last edited by Brenda Don on 9/14/2018  1:40 PM. (History)            Assessment /Plan     For exam results, see Encounter Report.    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 POAG od   S/P ahmed os 2011  S/P phaco/IOL / baerveldt os 2012       1. NVG OS 2/2 PDR // ?? POAG od - on gtts    FH:   CCT:449 // 540 (? [facility])    Gonio: OD +3; OS sup +3/nasal PAS/temp PAS/inf small PAS   Surgeries:AGV 2011;  phaco/BV 2012 (both at [facility])    Lasers: SLT, Cyclo G6 (2/15/17)    Tmax:    Tbase (before treatment):   T range on gtts from 2014 to 2016 12-19 od // 16-25 os ([facility])    Ttarget:     C/D: 0.4//0.9    Drop intolerance: ??    APD: ?trace OS    HVF 2012, 2013, 2014, 2015 - ([facility])      [facility] test 2016 to 2016 - SAD / IAD od // gen dep - SAD / IAD OS     OCT 2015 - RNFL - dec S, bord N/I od // dec S/I, bord N os ([facility])       [facility] - 1 test 2016 to 2016  RNFL - dec. S/I od // dec. S/I,  bord N   HRT 2 test 2017 to 2018: MR full od // dec TI/ bord NI os /// CDR 0.37 od // 0.55 os    T today: 12/12  Test done: HRT       - OFF -  Xalatan OD due to macular edema  - pt reports good adherence  - cont xal os, cosopt tid ou, alphagan tid ou      2.  PDR s/p PRP OU (DM2), with CSME OU (and HTN)  - Encouraged good BS/BP/Cholesterol control    3. DME OU  OD>OS  S/p Avastin OD x 9, OS x 7  Will monitor OS for now given NVG and ERM    4. NSC OD  - Not VS    5. PSK OS  - Stable, CTM, RD precautions       6. ERM OS  Given NVG hx, monitor  - CTM      S/P GDD x 2 os - the ST one is retracting out of cornea - just barely in - still functioning - lrg bleb over plate   S/P G6 CB destruction - OS - 2/16/2017 // pre-op IOP was 22     Cont cosopt ou tid   Cont alphagan tid ou   Stay off  latanoprost - was using os only - 2/2 DME od     Pre-op  phaco/IOL od - ok per gasper - ?? Can get avastin inj a week or so before surgery -   - dilates ok // + cortical spokes armd monocular / use trypan blue   Will check with Gasper about possible more  antiveg pre-op   IOL cacl OD  PCB00 17.0  AC IOL14.0    Risks and benefits discussed and consent signed.           If IOP goes up can re-start PG's os and / or repeat cyclo G6    Sees Gasper q 2-4 mos for antiveg therapy     I have seen and personally examined the patient.  I agree with the findings, assessment and plan of the resident and/or fellow.     Perla Cortés MD

## 2018-09-25 ENCOUNTER — TELEPHONE (OUTPATIENT)
Dept: OPTOMETRY | Facility: CLINIC | Age: 58
End: 2018-09-25

## 2018-09-25 NOTE — PRE-PROCEDURE INSTRUCTIONS
PreOp Instructions given:   - Verbal medication information (what to hold and what to take)   - NPO guidelines   - Arrival place directions given;   - Bathing with antibacterial soap   - Don't wear any jewelry or bring any valuables AM of surgery   - No makeup or moisturizer to face   - No perfume/cologne, powder, lotions or aftershave   Pt. verbalized understanding.   Denies any  history of side effects or issues with anesthesia or sedation.

## 2018-09-26 ENCOUNTER — ANESTHESIA (OUTPATIENT)
Dept: SURGERY | Facility: HOSPITAL | Age: 58
End: 2018-09-26
Payer: COMMERCIAL

## 2018-09-26 ENCOUNTER — HOSPITAL ENCOUNTER (OUTPATIENT)
Facility: HOSPITAL | Age: 58
Discharge: HOME | End: 2018-09-26
Attending: OPHTHALMOLOGY | Admitting: OPHTHALMOLOGY
Payer: COMMERCIAL

## 2018-09-26 ENCOUNTER — ANESTHESIA EVENT (OUTPATIENT)
Dept: SURGERY | Facility: HOSPITAL | Age: 58
End: 2018-09-26
Payer: COMMERCIAL

## 2018-09-26 VITALS
BODY MASS INDEX: 30.78 KG/M2 | OXYGEN SATURATION: 99 % | DIASTOLIC BLOOD PRESSURE: 75 MMHG | HEIGHT: 70 IN | HEART RATE: 86 BPM | WEIGHT: 215 LBS | TEMPERATURE: 98 F | RESPIRATION RATE: 18 BRPM | SYSTOLIC BLOOD PRESSURE: 125 MMHG

## 2018-09-26 DIAGNOSIS — H25.11 NUCLEAR SCLEROTIC CATARACT OF RIGHT EYE: Primary | ICD-10-CM

## 2018-09-26 LAB
POCT GLUCOSE: 127 MG/DL (ref 70–110)
POCT GLUCOSE: 128 MG/DL (ref 70–110)

## 2018-09-26 PROCEDURE — D9220A PRA ANESTHESIA: Mod: ANES,,, | Performed by: ANESTHESIOLOGY

## 2018-09-26 PROCEDURE — 71000015 HC POSTOP RECOV 1ST HR: Performed by: OPHTHALMOLOGY

## 2018-09-26 PROCEDURE — 82962 GLUCOSE BLOOD TEST: CPT | Performed by: OPHTHALMOLOGY

## 2018-09-26 PROCEDURE — 63600175 PHARM REV CODE 636 W HCPCS: Performed by: OPHTHALMOLOGY

## 2018-09-26 PROCEDURE — V2632 POST CHMBR INTRAOCULAR LENS: HCPCS | Performed by: OPHTHALMOLOGY

## 2018-09-26 PROCEDURE — D9220A PRA ANESTHESIA: Mod: CRNA,,, | Performed by: NURSE ANESTHETIST, CERTIFIED REGISTERED

## 2018-09-26 PROCEDURE — 36000707: Performed by: OPHTHALMOLOGY

## 2018-09-26 PROCEDURE — 36000706: Performed by: OPHTHALMOLOGY

## 2018-09-26 PROCEDURE — 63600175 PHARM REV CODE 636 W HCPCS: Performed by: NURSE ANESTHETIST, CERTIFIED REGISTERED

## 2018-09-26 PROCEDURE — 27201423 OPTIME MED/SURG SUP & DEVICES STERILE SUPPLY: Performed by: OPHTHALMOLOGY

## 2018-09-26 PROCEDURE — 37000009 HC ANESTHESIA EA ADD 15 MINS: Performed by: OPHTHALMOLOGY

## 2018-09-26 PROCEDURE — 25000003 PHARM REV CODE 250: Performed by: OPHTHALMOLOGY

## 2018-09-26 PROCEDURE — 66982 XCAPSL CTRC RMVL CPLX WO ECP: CPT | Mod: RT,,, | Performed by: OPHTHALMOLOGY

## 2018-09-26 PROCEDURE — 37000008 HC ANESTHESIA 1ST 15 MINUTES: Performed by: OPHTHALMOLOGY

## 2018-09-26 DEVICE — LENS IOL ITEC PRELOAD 17.0D: Type: IMPLANTABLE DEVICE | Site: EYE | Status: FUNCTIONAL

## 2018-09-26 RX ORDER — SODIUM CHLORIDE 0.9 % (FLUSH) 0.9 %
3 SYRINGE (ML) INJECTION
Status: DISCONTINUED | OUTPATIENT
Start: 2018-09-26 | End: 2018-09-26 | Stop reason: HOSPADM

## 2018-09-26 RX ORDER — ACETAMINOPHEN 325 MG/1
TABLET ORAL
Status: DISCONTINUED
Start: 2018-09-26 | End: 2018-09-26 | Stop reason: HOSPADM

## 2018-09-26 RX ORDER — ACETAZOLAMIDE 500 MG/1
500 CAPSULE, EXTENDED RELEASE ORAL ONCE
Status: COMPLETED | OUTPATIENT
Start: 2018-09-26 | End: 2018-09-26

## 2018-09-26 RX ORDER — MIDAZOLAM HYDROCHLORIDE 1 MG/ML
INJECTION, SOLUTION INTRAMUSCULAR; INTRAVENOUS
Status: DISCONTINUED | OUTPATIENT
Start: 2018-09-26 | End: 2018-09-26

## 2018-09-26 RX ORDER — EPINEPHRINE 1 MG/ML
INJECTION, SOLUTION INTRACARDIAC; INTRAMUSCULAR; INTRAVENOUS; SUBCUTANEOUS
Status: DISCONTINUED | OUTPATIENT
Start: 2018-09-26 | End: 2018-09-26 | Stop reason: HOSPADM

## 2018-09-26 RX ORDER — LIDOCAINE HYDROCHLORIDE 10 MG/ML
1 INJECTION, SOLUTION EPIDURAL; INFILTRATION; INTRACAUDAL; PERINEURAL ONCE
Status: DISCONTINUED | OUTPATIENT
Start: 2018-09-26 | End: 2018-09-26 | Stop reason: HOSPADM

## 2018-09-26 RX ORDER — PHENYLEPHRINE HYDROCHLORIDE 100 MG/ML
1 SOLUTION/ DROPS OPHTHALMIC
Status: DISCONTINUED | OUTPATIENT
Start: 2018-09-26 | End: 2018-09-26 | Stop reason: HOSPADM

## 2018-09-26 RX ORDER — MOXIFLOXACIN 5 MG/ML
SOLUTION/ DROPS OPHTHALMIC
Status: DISCONTINUED | OUTPATIENT
Start: 2018-09-26 | End: 2018-09-26 | Stop reason: HOSPADM

## 2018-09-26 RX ORDER — EPINEPHRINE 1 MG/ML
INJECTION, SOLUTION INTRACARDIAC; INTRAMUSCULAR; INTRAVENOUS; SUBCUTANEOUS
Status: DISCONTINUED
Start: 2018-09-26 | End: 2018-09-26 | Stop reason: HOSPADM

## 2018-09-26 RX ORDER — ACETAZOLAMIDE 500 MG/1
CAPSULE, EXTENDED RELEASE ORAL
Status: DISCONTINUED
Start: 2018-09-26 | End: 2018-09-26 | Stop reason: HOSPADM

## 2018-09-26 RX ORDER — TROPICAMIDE 10 MG/ML
1 SOLUTION/ DROPS OPHTHALMIC
Status: DISCONTINUED | OUTPATIENT
Start: 2018-09-26 | End: 2018-09-26 | Stop reason: HOSPADM

## 2018-09-26 RX ORDER — LIDOCAINE HYDROCHLORIDE 10 MG/ML
INJECTION, SOLUTION EPIDURAL; INFILTRATION; INTRACAUDAL; PERINEURAL
Status: DISCONTINUED
Start: 2018-09-26 | End: 2018-09-26 | Stop reason: HOSPADM

## 2018-09-26 RX ORDER — LIDOCAINE HYDROCHLORIDE 20 MG/ML
JELLY TOPICAL
Status: DISCONTINUED | OUTPATIENT
Start: 2018-09-26 | End: 2018-09-26 | Stop reason: HOSPADM

## 2018-09-26 RX ORDER — ACETAMINOPHEN 325 MG/1
650 TABLET ORAL EVERY 6 HOURS PRN
Status: DISCONTINUED | OUTPATIENT
Start: 2018-09-26 | End: 2018-09-26 | Stop reason: HOSPADM

## 2018-09-26 RX ORDER — LIDOCAINE HYDROCHLORIDE 20 MG/ML
JELLY TOPICAL
Status: DISCONTINUED
Start: 2018-09-26 | End: 2018-09-26 | Stop reason: HOSPADM

## 2018-09-26 RX ORDER — MOXIFLOXACIN 5 MG/ML
1 SOLUTION/ DROPS OPHTHALMIC
Status: DISCONTINUED | OUTPATIENT
Start: 2018-09-26 | End: 2018-09-26 | Stop reason: HOSPADM

## 2018-09-26 RX ORDER — LIDOCAINE HYDROCHLORIDE 10 MG/ML
INJECTION, SOLUTION EPIDURAL; INFILTRATION; INTRACAUDAL; PERINEURAL
Status: DISCONTINUED | OUTPATIENT
Start: 2018-09-26 | End: 2018-09-26 | Stop reason: HOSPADM

## 2018-09-26 RX ORDER — PREDNISOLONE ACETATE 10 MG/ML
SUSPENSION/ DROPS OPHTHALMIC
Status: DISCONTINUED | OUTPATIENT
Start: 2018-09-26 | End: 2018-09-26 | Stop reason: HOSPADM

## 2018-09-26 RX ORDER — LIDOCAINE HYDROCHLORIDE 40 MG/ML
INJECTION, SOLUTION RETROBULBAR
Status: DISCONTINUED | OUTPATIENT
Start: 2018-09-26 | End: 2018-09-26 | Stop reason: HOSPADM

## 2018-09-26 RX ORDER — PREDNISOLONE ACETATE 10 MG/ML
SUSPENSION/ DROPS OPHTHALMIC
Status: DISCONTINUED
Start: 2018-09-26 | End: 2018-09-26 | Stop reason: HOSPADM

## 2018-09-26 RX ORDER — KETOROLAC TROMETHAMINE 5 MG/ML
1 SOLUTION OPHTHALMIC
Status: DISCONTINUED | OUTPATIENT
Start: 2018-09-26 | End: 2018-09-26 | Stop reason: HOSPADM

## 2018-09-26 RX ORDER — SODIUM CHLORIDE 9 MG/ML
INJECTION, SOLUTION INTRAVENOUS CONTINUOUS
Status: DISCONTINUED | OUTPATIENT
Start: 2018-09-26 | End: 2018-09-26 | Stop reason: HOSPADM

## 2018-09-26 RX ORDER — LIDOCAINE HYDROCHLORIDE 40 MG/ML
INJECTION, SOLUTION RETROBULBAR
Status: DISCONTINUED
Start: 2018-09-26 | End: 2018-09-26 | Stop reason: HOSPADM

## 2018-09-26 RX ADMIN — KETOROLAC TROMETHAMINE 1 DROP: 5 SOLUTION OPHTHALMIC at 07:09

## 2018-09-26 RX ADMIN — PHENYLEPHRINE HYDROCHLORIDE 1 DROP: 100 SOLUTION/ DROPS OPHTHALMIC at 07:09

## 2018-09-26 RX ADMIN — MOXIFLOXACIN 1 DROP: 5 SOLUTION/ DROPS OPHTHALMIC at 07:09

## 2018-09-26 RX ADMIN — TROPICAMIDE 1 DROP: 10 SOLUTION/ DROPS OPHTHALMIC at 07:09

## 2018-09-26 RX ADMIN — ACETAMINOPHEN 650 MG: 325 TABLET, FILM COATED ORAL at 08:09

## 2018-09-26 RX ADMIN — MIDAZOLAM HYDROCHLORIDE 1 MG: 1 INJECTION, SOLUTION INTRAMUSCULAR; INTRAVENOUS at 08:09

## 2018-09-26 RX ADMIN — ACETAZOLAMIDE 500 MG: 500 CAPSULE, EXTENDED RELEASE ORAL at 08:09

## 2018-09-26 RX ADMIN — SODIUM CHLORIDE: 0.9 INJECTION, SOLUTION INTRAVENOUS at 07:09

## 2018-09-26 NOTE — DISCHARGE SUMMARY
Date of Procedure / Discharge: 09/26/2018     PreOp Diagnosis: Complex Cataract OD      PostOp Diagnosis:as above s/p procedure    Procedure:Complex Cataract Extraction with Phacoemulsification and trypan blue and extra viscoelastic  w/ Posterior Chamber IOL Implantation    Attending:Perla Cortés MD    Assistant:Conchita Busch MD      Anesthesia:Local MAC    Complications:: None    Blood loss: <5 CC    Specimens: none    Procedure Details:  See Dictation      -D/C home when patient meets anesthesia requirements  -Follow up tomorrow with surgeon in the [facility].

## 2018-09-26 NOTE — ANESTHESIA PREPROCEDURE EVALUATION
09/26/2018  Marvin Ray is a 58 y.o., male.    Anesthesia Evaluation    I have reviewed the Patient Summary Reports.    I have reviewed the Nursing Notes.   I have reviewed the Medications.     Review of Systems  Anesthesia Hx:  No problems with previous Anesthesia Denies Hx of Anesthetic complications  Neg history of prior surgery. Denies Family Hx of Anesthesia complications.   Denies Personal Hx of Anesthesia complications.   Social:  Non-Smoker, No Alcohol Use    Hematology/Oncology:  Hematology Normal   Oncology Normal     EENT/Dental:EENT/Dental Normal   Cardiovascular:  Cardiovascular Normal Exercise tolerance: good Hypertension CAD  asymptomatic  ECG has been reviewed.    Pulmonary:  Pulmonary Normal    Renal/:  Renal/ Normal     Hepatic/GI:  Hepatic/GI Normal    Musculoskeletal:  Musculoskeletal Normal Arthritis      Neurological:   Neuromuscular Disease,    Endocrine:  Endocrine Normal Diabetes    Dermatological:  Skin Normal    Psych:  Psychiatric Normal           Physical Exam  General:  Obesity    Airway/Jaw/Neck:  Airway Findings: Mouth Opening: Normal Tongue: Normal  General Airway Assessment: Adult  Mallampati: II  Improves to I with phonation.  TM Distance: Normal, at least 6 cm  Jaw/Neck Findings:  Micrognathia: Negative Neck ROM: Normal ROM      Dental:  Dental Findings:    Chest/Lungs:  Chest/Lungs Findings: Clear to auscultation, Normal Respiratory Rate     Heart/Vascular:  Heart Findings: Rate: Normal  Rhythm: Regular Rhythm  Sounds: Normal  Heart murmur: negative    Abdomen:  Abdomen Findings:  Nontender, Soft  protuberant       Mental Status:  Mental Status Findings:  Cooperative, Alert and Oriented         Anesthesia Plan  Type of Anesthesia, risks & benefits discussed:  Anesthesia Type:  MAC, general  Patient's Preference:   Intra-op Monitoring Plan: standard ASA  monitors  Intra-op Monitoring Plan Comments:   Post Op Pain Control Plan: multimodal analgesia  Post Op Pain Control Plan Comments:   Induction:   IV  Beta Blocker:  Patient is on a Beta-Blocker and has received one dose within the past 24 hours (No further documentation required).       Informed Consent: Patient understands risks and agrees with Anesthesia plan.  Questions answered. Anesthesia consent signed with patient.  ASA Score: 2     Day of Surgery Review of History & Physical:    H&P update referred to the surgeon.         Ready For Surgery From Anesthesia Perspective.

## 2018-09-26 NOTE — DISCHARGE INSTRUCTIONS
Discharge Instructions for Cataract Surgery  A surgeon removed the cloudy lens in your eye and replaced it with a clear man-made lens. Be sure to have an adult family member or friend drive you home after surgery. Heres what you can expect following surgery and tips for a healthy recovery.  It is normal to have the following:  · Bruised or bloodshot eye for 7 days  · Itching and mild discomfort for several days  · Some fluid discharge  · Sensitivity to light  · Scratchy, sandlike feeling in the eye for 2 weeks  · Feeling tired, especially during the first 24 hours  Activity level  · Do not drive for 2 days or as instructed by your doctor.  · Do not drink alcohol for at least 24 hours.  · Avoid bending at the waist to  objects or lifting anything heavy for 2 days.  · Relax for the first 24 hours after surgery. Watching TV and reading are OK and wont harm your eye.  Eye protection  · Do not rub or press on your eye.  · Sleep on your back or on your unoperated side for 2 nights.  · If instructed, wear a bandage over your eye for 2 days and 2 nights.  · If instructed, wear a shield to protect your eye for 2 days and 2 nights.  Using eyedrops  You may be given special eyedrops or ointment. Here is one way to use eyedrops:  · Tilt your head back.  · Pull your bottom eyelid down.  · Squeeze one drop into your eye. Do not touch your eye with the bottle tip.  · Close your eyes for a few seconds.  · If you need more than one drop, wait a few minutes before adding the next one.   Call your doctor right away if you have any of the following:  · Bleeding or discharge from the eye  · Your vision suddenly becomes worse  · Pain medicine you are told to take does not ease your pain  · Nausea or vomiting  · Chills or fever over 100.4°F (39.1°C)   Date Last Reviewed: 5/30/2015  © 6842-1827 [employer]. [address] [place] [address], [place] [address]. All rights reserved. This information is not intended as a  substitute for professional medical care. Always follow your healthcare professional's instructions.

## 2018-09-26 NOTE — INTERVAL H&P NOTE
H&P completed on 9/14/18 has been reviewed, the patient examined and I concur with the findings and plan.

## 2018-09-26 NOTE — OP NOTE
DATE OF PROCEDURE: 9/26/2018    PREOPERATIVE DIAGNOSIS: Complex Cataract - poor red reflex // floppy iris // Nuclear sclerotic cataract of right eye OD.     POSTOPERATIVE DIAGNOSIS: Complex Cataract - poor red reflex // floppy iris // Nuclear sclerotic cataract of right eyeOD, status post procedure.     PROCEDURE PERFORMED: Complex Cataract extraction with with trypan blue and extra  viscoelastic to handle the floppy iris and phacoemulsification, posterior   chamber intraocular lens placement.     SURGEON: Perla Cortés M.D.     ASSISTANT: Conchita Busch MD      COMPLICATIONS: None.     BLOOD LOSS: Less than 5 mL.     ANESTHESIA: Local MAC    IMPLANT DATA:   1. Rodrigez PCB00 , power 17.0 diopters, serial #[id number]    PROCEDURE IN DETAIL: After informed consent including risks, benefits,   alternatives, the patient was brought to the operating room table, placed in   supine position. Monitored anesthesia care was used throughout the entire   procedure. Once adequate anesthesia was confirmed, the patient was then prepped   and draped in usual sterile fashion for intraocular surgery. Wire speculum was   used to hold the eyelids apart and the procedure was initiated by making   a partial thickness corneal wound with a patricio blade temporally.   A supersharp blade was then used to make a second entry into the eye via a paracentesis incision.  Intracameral lidocaine followed by trypan blue, followed by  Viscoat were placed in the anterior chamber.   A 2.4 mm blade was then used to make to complete the clear corneal   incision temporally. A cystotome was used to make a tear in   the anterior capsular flap, which was continued around with Utrata forceps for   continuous curvilinear capsulorrhexis. BSS on a Rodriguez cannula was then used for   hydrodissection and hydrodelineation of lens. The lens was noted to spin   freely in the bag. The iris was floppy and began to constrict. More viscoelastic was placed in the  anterior chamber.   Phacoemulsification handpiece was then used to remove the   lens in a divide-and-conquer manner. Irrigation aspiration handpiece removed   the remaining cortical material. Provisc was placed in the eye followed by the   lens as mentioned above without any complications. Once the lens was in proper   position, irrigation aspiration handpiece was used to remove the remaining Provisc. The   wounds were then hydrated with BSS and noted to be watertight. The eye had a   good physiological pressure and the lid speculum was removed under the   microscope without any shallowing. The eye was then covered with a collagen   shield soaked in Pred Forte and Vigamox and turned over to Anesthesia in stable   condition after placement of patch and shield.

## 2018-09-26 NOTE — ANESTHESIA POSTPROCEDURE EVALUATION
"Anesthesia Post Evaluation    Patient: Marvin Ray    Procedure(s) Performed: Procedure(s) (LRB):  PHACOEMULSIFICATION, CATARACT (Right)  INSERTION, IOL PROSTHESIS (Right)    Final Anesthesia Type: MAC  Patient location during evaluation: PACU  Patient participation: Yes- Able to Participate  Level of consciousness: awake and alert, awake and oriented  Post-procedure vital signs: reviewed and stable  Pain management: adequate  Airway patency: patent  PONV status at discharge: No PONV  Anesthetic complications: no      Cardiovascular status: blood pressure returned to baseline, stable and hemodynamically stable  Respiratory status: unassisted, spontaneous ventilation and room air  Hydration status: euvolemic  Follow-up not needed.        Visit Vitals  /75   Pulse 86   Temp 36.5 °C (97.7 °F) (Temporal)   Resp 18   Ht 5' 10" (1.778 m)   Wt 97.5 kg (215 lb)   SpO2 99%   BMI 30.85 kg/m²       Pain/Ralf Score: Pain Assessment Performed: Yes (9/26/2018  9:13 AM)  Presence of Pain: denies (9/26/2018  9:13 AM)  Pain Rating Prior to Med Admin: 1 (9/26/2018  8:57 AM)  Ralf Score: 10 (9/26/2018  9:13 AM)        "

## 2018-09-26 NOTE — TRANSFER OF CARE
"Anesthesia Transfer of Care Note    Patient: Marvin Ray    Procedure(s) Performed: Procedure(s) (LRB):  PHACOEMULSIFICATION, CATARACT (Right)  INSERTION, IOL PROSTHESIS (Right)    Patient location: PACU    Anesthesia Type: MAC    Transport from OR: Transported from OR on room air with adequate spontaneous ventilation    Post pain: adequate analgesia    Post assessment: no apparent anesthetic complications and tolerated procedure well    Post vital signs: stable    Level of consciousness: awake, alert and oriented    Nausea/Vomiting: no nausea/vomiting    Complications: none    Transfer of care protocol was followed      Last vitals:   Visit Vitals  /90 (BP Location: Left arm, Patient Position: Lying)   Pulse 85   Temp 36.8 °C (98.2 °F) (Oral)   Resp 18   Ht 5' 10" (1.778 m)   Wt 97.5 kg (215 lb)   SpO2 99%   BMI 30.85 kg/m²     "

## 2018-09-27 ENCOUNTER — OFFICE VISIT (OUTPATIENT)
Dept: OPHTHALMOLOGY | Facility: CLINIC | Age: 58
End: 2018-09-27
Payer: COMMERCIAL

## 2018-09-27 DIAGNOSIS — H40.52X3 NEOVASCULAR GLAUCOMA OF LEFT EYE, SEVERE STAGE: ICD-10-CM

## 2018-09-27 DIAGNOSIS — H40.1112 PRIMARY OPEN ANGLE GLAUCOMA OF RIGHT EYE, MODERATE STAGE: ICD-10-CM

## 2018-09-27 DIAGNOSIS — H35.373 EPIRETINAL MEMBRANE, BOTH EYES: ICD-10-CM

## 2018-09-27 DIAGNOSIS — Z96.1 PSEUDOPHAKIA: ICD-10-CM

## 2018-09-27 DIAGNOSIS — E11.3512 DIABETIC MACULAR EDEMA OF LEFT EYE WITH PROLIFERATIVE RETINOPATHY ASSOCIATED WITH TYPE 2 DIABETES MELLITUS: ICD-10-CM

## 2018-09-27 DIAGNOSIS — E11.3511 DIABETIC MACULAR EDEMA OF RIGHT EYE WITH PROLIFERATIVE RETINOPATHY ASSOCIATED WITH TYPE 2 DIABETES MELLITUS: ICD-10-CM

## 2018-09-27 DIAGNOSIS — H25.11 NUCLEAR SCLEROTIC CATARACT OF RIGHT EYE: Primary | ICD-10-CM

## 2018-09-27 PROCEDURE — 99999 PR PBB SHADOW E&M-EST. PATIENT-LVL II: CPT | Mod: PBBFAC,,, | Performed by: OPHTHALMOLOGY

## 2018-09-27 PROCEDURE — 99024 POSTOP FOLLOW-UP VISIT: CPT | Mod: S$GLB,,, | Performed by: OPHTHALMOLOGY

## 2018-09-27 RX ORDER — PREDNISOLONE ACETATE 10 MG/ML
1 SUSPENSION/ DROPS OPHTHALMIC 4 TIMES DAILY
COMMUNITY
Start: 2018-09-27 | End: 2018-10-04 | Stop reason: SDUPTHER

## 2018-09-27 RX ORDER — MOXIFLOXACIN 5 MG/ML
1 SOLUTION/ DROPS OPHTHALMIC 4 TIMES DAILY
COMMUNITY
Start: 2018-09-27 | End: 2018-10-06

## 2018-09-27 NOTE — PROGRESS NOTES
HPI     DLS: 9/14/18    Pt here for 1 day post phaco w/IOL OD- 9/26/18  Pt states no pain or discomfort.     1. NVG OS   2. POAG OD   3. PDR with CSME OU   4. DME OU   5. NS OD   5. PCIOL OS   6. ERM OS     Last edited by Brenda Don on 9/27/2018  9:09 AM. (History)            Assessment /Plan     For exam results, see Encounter Report.    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 POAG od   S/P ahmed os 2011  S/P phaco/IOL / baerveldt os 2012       1. NVG OS 2/2 PDR // ?? POAG od - on gtts    FH:   CCT:449 // 540 (? [facility])    Gonio: OD +3; OS sup +3/nasal PAS/temp PAS/inf small PAS   Surgeries:AGV 2011;  phaco/BV 2012 (both at [facility])    Lasers: SLT, Cyclo G6 (2/15/17)    Tmax:    Tbase (before treatment):   T range on gtts from 2014 to 2016 12-19 od // 16-25 os ([facility])    Ttarget:     C/D: 0.4//0.9    Drop intolerance: ??    APD: ?trace OS    HVF 2012, 2013, 2014, 2015 - ([facility])      [facility] - 1 test 2016 to 2016 - SAD / IAD od // gen dep - SAD / IAD OS     OCT 2015 - RNFL - dec S, bord N/I od // dec S/I, bord N os ([facility])       [facility] - 1 test 2016 to 2016  RNFL - dec. S/I od // dec. S/I, bord N   HRT 2 test 2017 to 2018: MR full od // dec TI/ mg PEREIRA os /// CDR 0.37  od // 0.55 os    T today: 14/??  Test done: post op phaco/IOL od        - OFF -  Xalatan OD due to macular edema  - pt reports good adherence  - cont xal os, cosopt tid ou, alphagan tid ou      2.  PDR s/p PRP OU (DM2), with CSME OU (and HTN)  - Encouraged good BS/BP/Cholesterol control    3. DME OU  OD>OS  S/p Avastin OD x 9, OS x 7  Will monitor OS for now given NVG and ERM    4. NSC OD  - Not VS    5. PSK OS  - Stable, CTM, RD precautions       6. ERM OS  Given NVG hx, monitor  - CTM      S/P GDD x 2 os - the ST one is retracting out of cornea - just barely in - still functioning - lrg bleb over plate   S/P G6 CB destruction - OS - 2/16/2017 // pre-op IOP was 22     Cont cosopt ou tid   Cont alphagan tid ou   Stay off  latanoprost - was using os only - 2/2 DME od     Post -op  phaco/IOL od - ok per gasper - ?? Can get avastin inj a week or so before surgery -   - dilates ok // + cortical spokes armd monocular / use trypan blue     Phaco / IOL OD Date: 9/26/2018 - PCB00 17.0   POD # 1 - phaco/IOL   Doing well  Begin Pred Acetate QID   Begin vigamox QID  ketoorolac qid od   Shield at night  Glasses day  No lifting, no bending, no eye rubbing  F/U 1 week, AR/MR ou    Cont cosopt tid ou   Cont alphagan P tid ou     IOL cacl OD  PCB00 17.0  AC IOL14.0      If IOP goes up can re-start PG's os and / or repeat cyclo G6    Sees Gasper q 2-4 mos for antiveg therapy     I have seen and personally examined the patient.  I agree with the findings, assessment and plan of the resident and/or fellow.     Perla Cortés MD

## 2018-10-03 NOTE — PROGRESS NOTES
Assessment /Plan     For exam results, see Encounter Report.    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 POAG od   S/P ahmed os 2011  S/P phaco/IOL / baerveldt os 2012       1. NVG OS 2/2 PDR // ?? POAG od - on gtts    First HVF   ? 10/2014    First photos   6/2016   Treatment / Drops started   Years ago           Family history    ?        Glaucoma meds    cosopt ou / alphagan ou         H/O adverse rxn to glaucoma drops    ? Try and avoid PG's - PDR with ME and NVG os         LASERS    SLT - ? Os or OU // G6 laser os // PRP ou         GLAUCOMA SURGERIES    ahmed os // baerveldt os - both at [facility] //         OTHER EYE SURGERIES    Phaco/IOL od 9/26/2018         CDR    0.4/0.9        Tbase    ??  (on gtts at [facility] from 2014 to 2016  ran - 12-19 od // 16-25 os )         Tmax    ?             Ttarget    ?             HVF    4 test 2014 to  2015 - [place] 2 test 2016 to 2018  - SAD/IAD od // gen dep - SAD / IAD os         Gonio    +3 od // +3 sup os with PAS T/N/I         CCT    449/540        OCT    2 test 2016 to 2018 - RNFL - dec S/I od  od // dec S/I, bord N  os        HRT    2 test 2017 to 2018 - MR - nl  od // dec  TI/ bord NI  os        Disc photos    2016     - Ttoday    16/12  - Test done today    F/U phaco/IOL od      - OFF -  Xalatan OD due to macular edema  - pt reports good adherence  - cont xal os, cosopt tid ou, alphagan tid ou      2.  PDR s/p PRP OU (DM2), with CSME OU (and HTN)  - Encouraged good BS/BP/Cholesterol control    3. DME OU  OD>OS  S/p Avastin OD x 9, OS x 7  Will monitor OS for now given NVG and ERM    4. NSC OD  - Not VS    5. PSK OS  - Stable, CTM, RD precautions       6. ERM OS  Given NVG hx, monitor  - CTM      S/P GDD x 2 os - the ST one is retracting out of cornea - just barely in - still functioning - lrg bleb over plate   S/P G6 CB destruction - OS - 2/16/2017 // pre-op IOP was 22     Cont cosopt ou tid   Cont alphagan tid ou   Stay off  latanoprost - was using os only - 2/2 DME od     Post -op  phaco/IOL od - ok to proceed wit CE  per lamar -   ?? Can get avastin inj a week or so before surgery -   - dilates ok // + cortical spokes armd monocular / use trypan blue     Phaco / IOL OD Date: 9/26/2018 - PCB00 17.0   POW 1  - phaco/IOL   Doing well    - BUT WITH NEW RUBEOSIS AT THE PUPIL OD AND 1 SMALL AREA OF ANGLE VIRI ING OD   CONT  Pred Acetate tid   Begin vigamox QID - finish   ketoorolac decrease to tid   Shield at night  Glasses day  No lifting, no bending, no eye rubbing  F/U 3-4 week, AR/MR ou - review lamar notes // check for rubeosis od and check for angle viri od     Cont cosopt tid ou   Cont alphagan P tid ou     IOL cacl OD  PCB00 17.0  AC IOL14.0    If IOP goes up can re-start PG's os and / or repeat cyclo G6    REFER BACK TO [facility] FOR NEW ONSET RUBEOSIS OD AT 1 WEEK POST PHACO/IOL OD   HAS A SMALL AREA OF NEW NVA INF OD   IT HAS BEEN 3 MONTHS SINCE LAST AVASTIN INJECTION OD (he has an appt in 4-6 weeks - but wll try and move it up since has new rubeosis od and new early NVA od )     I have seen and personally examined the patient.  I agree with the findings, assessment and  plan of the resident and/or fellow.     Perla Cortés MD

## 2018-10-04 ENCOUNTER — OFFICE VISIT (OUTPATIENT)
Dept: OPHTHALMOLOGY | Facility: CLINIC | Age: 58
End: 2018-10-04
Payer: COMMERCIAL

## 2018-10-04 DIAGNOSIS — Z48.810 POSTOPERATIVE CARE FOR CATARACT: Primary | ICD-10-CM

## 2018-10-04 DIAGNOSIS — E11.3511 DIABETIC MACULAR EDEMA OF RIGHT EYE WITH PROLIFERATIVE RETINOPATHY ASSOCIATED WITH TYPE 2 DIABETES MELLITUS: ICD-10-CM

## 2018-10-04 DIAGNOSIS — H35.373 EPIRETINAL MEMBRANE, BOTH EYES: ICD-10-CM

## 2018-10-04 DIAGNOSIS — E11.3512 DIABETIC MACULAR EDEMA OF LEFT EYE WITH PROLIFERATIVE RETINOPATHY ASSOCIATED WITH TYPE 2 DIABETES MELLITUS: ICD-10-CM

## 2018-10-04 DIAGNOSIS — H40.52X3 NEOVASCULAR GLAUCOMA OF LEFT EYE, SEVERE STAGE: ICD-10-CM

## 2018-10-04 DIAGNOSIS — H40.1112 PRIMARY OPEN ANGLE GLAUCOMA OF RIGHT EYE, MODERATE STAGE: ICD-10-CM

## 2018-10-04 DIAGNOSIS — Z98.49 POSTOPERATIVE CARE FOR CATARACT: Primary | ICD-10-CM

## 2018-10-04 DIAGNOSIS — H21.1X1 RUBEOSIS IRIDIS, RIGHT: ICD-10-CM

## 2018-10-04 DIAGNOSIS — Z96.89 HISTORY OF GLAUCOMA TUBE SHUNT PROCEDURE: ICD-10-CM

## 2018-10-04 DIAGNOSIS — Z96.1 PSEUDOPHAKIA: ICD-10-CM

## 2018-10-04 PROCEDURE — 99024 POSTOP FOLLOW-UP VISIT: CPT | Mod: S$GLB,,, | Performed by: OPHTHALMOLOGY

## 2018-10-04 PROCEDURE — 99999 PR PBB SHADOW E&M-EST. PATIENT-LVL II: CPT | Mod: PBBFAC,,, | Performed by: OPHTHALMOLOGY

## 2018-10-04 RX ORDER — KETOROLAC TROMETHAMINE 5 MG/ML
1 SOLUTION OPHTHALMIC 3 TIMES DAILY
Qty: 1 BOTTLE | Refills: 2 | Status: ON HOLD | OUTPATIENT
Start: 2018-10-04 | End: 2018-12-13

## 2018-10-04 RX ORDER — KETOROLAC TROMETHAMINE 5 MG/ML
1 SOLUTION OPHTHALMIC 4 TIMES DAILY
COMMUNITY
Start: 2018-09-27 | End: 2018-10-04 | Stop reason: SDUPTHER

## 2018-10-04 RX ORDER — PREDNISOLONE ACETATE 10 MG/ML
1 SUSPENSION/ DROPS OPHTHALMIC 3 TIMES DAILY
Qty: 1 BOTTLE | Refills: 2 | Status: SHIPPED | OUTPATIENT
Start: 2018-10-04 | End: 2018-11-30 | Stop reason: SDUPTHER

## 2018-10-04 NOTE — Clinical Note
At one week post phaco/IOL od - has new rubeosis od and early angle geovany od. It has been 3 months since his last avastin od. Can you please see him and do anotehr avastin injection before the angle geovany progresses. I got him an appt to see you next week - thanks KL

## 2018-10-08 DIAGNOSIS — I10 ESSENTIAL HYPERTENSION: ICD-10-CM

## 2018-10-08 RX ORDER — AMLODIPINE BESYLATE 5 MG/1
TABLET ORAL
Qty: 30 TABLET | Refills: 0 | Status: ON HOLD | OUTPATIENT
Start: 2018-10-08 | End: 2018-11-09 | Stop reason: SDUPTHER

## 2018-10-09 ENCOUNTER — PROCEDURE VISIT (OUTPATIENT)
Dept: OPHTHALMOLOGY | Facility: CLINIC | Age: 58
End: 2018-10-09
Payer: COMMERCIAL

## 2018-10-09 VITALS — DIASTOLIC BLOOD PRESSURE: 60 MMHG | HEART RATE: 89 BPM | SYSTOLIC BLOOD PRESSURE: 88 MMHG

## 2018-10-09 DIAGNOSIS — H40.51X1 NEOVASCULAR GLAUCOMA, RIGHT EYE, MILD STAGE: ICD-10-CM

## 2018-10-09 PROCEDURE — 92014 COMPRE OPH EXAM EST PT 1/>: CPT | Mod: 25,S$GLB,, | Performed by: OPHTHALMOLOGY

## 2018-10-09 PROCEDURE — 92134 CPTRZ OPH DX IMG PST SGM RTA: CPT | Mod: S$GLB,,, | Performed by: OPHTHALMOLOGY

## 2018-10-09 PROCEDURE — 67028 INJECTION EYE DRUG: CPT | Mod: 79,RT,S$GLB, | Performed by: OPHTHALMOLOGY

## 2018-10-09 RX ADMIN — Medication 1.25 MG: at 11:10

## 2018-10-09 NOTE — PATIENT INSTRUCTIONS

## 2018-10-09 NOTE — PROGRESS NOTES
HPI     Eye Problem      Additional comments: check per [place]/rubeosis OD              Comments     DLS 7/3/18- Vision blurry OU    Cosopt TID OU   Alphagan TID OU   PF TID OD  Ketorolac TID OD      HPI     Eye Problem    Additional comments: 4 mos check           Comments   DLS 3/13/18- Vision OU worse than last visit. He doesn't know how his DM   is doing because he hasn't checked BS in a long time. He hasn't had   insulin for about a year now. His provider is no longer on insurance and   hasn't had appointment since then    Cosopt TID OU   Alphagan TID OU       OCT : OD shows central DME, stable   OS ERM with DME central cyst, stable    Prior FA: shows macular leakage OU, significant NP OU, no NV OU, enlarged SURINDER OU    A/P    1. PDR s/p PRP OU (DM2), with CSME OU (and HTN)  - Encouraged good BS/BP/Cholesterol control  T2 uncontrolled on insulin    2. DME OU, OD>OS  - S/p Avastin OD x 16, OS x 7  - Will monitor OS for now given NVG and ERM  - Could consider Ozurdex but pt is POAG and phakic, IOP good right now)  With some NVI/NVA post CE OD  Will inject avastin q 3 month for now and try fill in PRP OD    - Avastin OD Today      2. NVG OS   FH:   CCT:449 // 540    Gonio: OD CBB; OS sup CBB/nasal small NV at 9:00/inf PAS/temp CBB with PAS   Surgeries:;  phaco/BV 2012   Lasers: SLT   Tmax:    Tbase (before treatment):   Ttarget:     Drop intolerance:    APD:     - Stopped Xalatan OD due to macular edema  - pt reports good adherence  - No NVA OD. 1 NVA vessel at 9 oclock OS  - Cont drops as above- Rx refilled    Sees KL    3. PCIOL OD  Some NVA noted after CE      4. PSK OS  - Stable, CTM, RD precautions       5. ERM OS>OD  - Given NVG hx, monitor  - CTM      6 weeks PRP fill in OD    Risks, benefits, and alternatives to treatment discussed in detail with the patient.  The patient voiced understanding and wished to proceed with the procedure    Injection Procedure Note:  Diagnosis: DME OD/PDR/Ear;y NVG  OD    Patient Identified and Time Out complete  Topical Proparacaine and Betadine.  Inject Avastin OD at 6:00 @ 3.5-4mm posterior to limbus  Post Operative Dx: Same  Complications: None  Follow up as above.

## 2018-10-10 ENCOUNTER — TELEPHONE (OUTPATIENT)
Dept: FAMILY MEDICINE | Facility: CLINIC | Age: 58
End: 2018-10-10

## 2018-10-10 ENCOUNTER — HOSPITAL ENCOUNTER (INPATIENT)
Facility: HOSPITAL | Age: 58
LOS: 7 days | Discharge: HOME HEALTH | DRG: 603 | End: 2018-10-17
Attending: EMERGENCY MEDICINE | Admitting: INTERNAL MEDICINE
Payer: COMMERCIAL

## 2018-10-10 DIAGNOSIS — I73.9 PVD (PERIPHERAL VASCULAR DISEASE): ICD-10-CM

## 2018-10-10 DIAGNOSIS — R52 PAIN: ICD-10-CM

## 2018-10-10 DIAGNOSIS — L08.9 DIABETIC FOOT INFECTION: ICD-10-CM

## 2018-10-10 DIAGNOSIS — E11.628 DIABETIC FOOT INFECTION: ICD-10-CM

## 2018-10-10 DIAGNOSIS — R60.1 ANASARCA: ICD-10-CM

## 2018-10-10 DIAGNOSIS — R06.02 SOB (SHORTNESS OF BREATH): ICD-10-CM

## 2018-10-10 DIAGNOSIS — I25.10 CAD (CORONARY ARTERY DISEASE): ICD-10-CM

## 2018-10-10 DIAGNOSIS — L03.116 LEFT LEG CELLULITIS: Primary | ICD-10-CM

## 2018-10-10 DIAGNOSIS — A41.9 SEPSIS, DUE TO UNSPECIFIED ORGANISM: ICD-10-CM

## 2018-10-10 DIAGNOSIS — R60.9 SWELLING: ICD-10-CM

## 2018-10-10 DIAGNOSIS — I21.4 NSTEMI (NON-ST ELEVATED MYOCARDIAL INFARCTION): ICD-10-CM

## 2018-10-10 DIAGNOSIS — N17.9 AKI (ACUTE KIDNEY INJURY): ICD-10-CM

## 2018-10-10 LAB
ALBUMIN SERPL BCP-MCNC: 3 G/DL
ALBUMIN SERPL-MCNC: 3.2 G/DL (ref 3.3–5.5)
ALP SERPL-CCNC: 80 U/L (ref 42–141)
ALP SERPL-CCNC: 94 U/L
ALT SERPL W/O P-5'-P-CCNC: 25 U/L
ANION GAP SERPL CALC-SCNC: 12 MMOL/L
AST SERPL-CCNC: 26 U/L
BASOPHILS NFR BLD: 0 %
BILIRUB SERPL-MCNC: 1.9 MG/DL
BILIRUB SERPL-MCNC: 1.9 MG/DL (ref 0.2–1.6)
BUN SERPL-MCNC: 46 MG/DL (ref 7–22)
BUN SERPL-MCNC: 48 MG/DL
CALCIUM SERPL-MCNC: 9.3 MG/DL (ref 8–10.3)
CALCIUM SERPL-MCNC: 9.5 MG/DL
CHLORIDE SERPL-SCNC: 101 MMOL/L (ref 98–108)
CHLORIDE SERPL-SCNC: 103 MMOL/L
CO2 SERPL-SCNC: 18 MMOL/L
CREAT SERPL-MCNC: 1.5 MG/DL
CREAT SERPL-MCNC: 2 MG/DL (ref 0.6–1.2)
CRP SERPL-MCNC: 360.7 MG/L
DIFFERENTIAL METHOD: ABNORMAL
EOSINOPHIL NFR BLD: 0 %
ERYTHROCYTE [DISTWIDTH] IN BLOOD BY AUTOMATED COUNT: 17.1 %
ERYTHROCYTE [SEDIMENTATION RATE] IN BLOOD BY WESTERGREN METHOD: 39 MM/HR
EST. GFR  (AFRICAN AMERICAN): 58 ML/MIN/1.73 M^2
EST. GFR  (NON AFRICAN AMERICAN): 50 ML/MIN/1.73 M^2
GLUCOSE SERPL-MCNC: 190 MG/DL
GLUCOSE SERPL-MCNC: 257 MG/DL (ref 73–118)
HCO3 UR-SCNC: 21.8 MMOL/L (ref 24–28)
HCT VFR BLD AUTO: 39.5 %
HGB BLD-MCNC: 13.5 G/DL
LACTATE SERPL-SCNC: 3 MMOL/L
LDH SERPL L TO P-CCNC: 2.72 MMOL/L (ref 0.5–2.2)
LYMPHOCYTES NFR BLD: 2 %
MCH RBC QN AUTO: 31.2 PG
MCHC RBC AUTO-ENTMCNC: 34.2 G/DL
MCV RBC AUTO: 91 FL
MONOCYTES NFR BLD: 3 %
NEUTROPHILS NFR BLD: 54 %
NEUTS BAND NFR BLD MANUAL: 41 %
PCO2 BLDA: 39.3 MMHG (ref 35–45)
PH SMN: 7.35 [PH] (ref 7.35–7.45)
PLATELET # BLD AUTO: 183 K/UL
PMV BLD AUTO: 10.7 FL
PO2 BLDA: 22 MMHG (ref 40–60)
POC ALT (SGPT): 23 U/L (ref 10–47)
POC AST (SGOT): 33 U/L (ref 11–38)
POC B-TYPE NATRIURETIC PEPTIDE: 1480 PG/ML (ref 0–100)
POC BE: -4 MMOL/L
POC CARDIAC TROPONIN I: 0.23 NG/ML
POC PTINR: 1.7 (ref 0.9–1.2)
POC PTWBT: 20.4 SEC (ref 9.7–14.3)
POC SATURATED O2: 36 % (ref 95–100)
POC TCO2: 23 MMOL/L (ref 24–29)
POC TCO2: 24 MMOL/L (ref 18–33)
POCT GLUCOSE: 189 MG/DL (ref 70–110)
POCT GLUCOSE: 198 MG/DL (ref 70–110)
POCT GLUCOSE: 220 MG/DL (ref 70–110)
POTASSIUM BLD-SCNC: 5.2 MMOL/L (ref 3.6–5.1)
POTASSIUM SERPL-SCNC: 4.6 MMOL/L
PROT SERPL-MCNC: 6.8 G/DL
PROTEIN, POC: 6.6 G/DL (ref 6.4–8.1)
RBC # BLD AUTO: 4.33 M/UL
SAMPLE: ABNORMAL
SODIUM BLD-SCNC: 139 MMOL/L (ref 128–145)
SODIUM SERPL-SCNC: 133 MMOL/L
WBC # BLD AUTO: 10.99 K/UL

## 2018-10-10 PROCEDURE — 25000003 PHARM REV CODE 250: Performed by: EMERGENCY MEDICINE

## 2018-10-10 PROCEDURE — 25000003 PHARM REV CODE 250: Performed by: INTERNAL MEDICINE

## 2018-10-10 PROCEDURE — 93005 ELECTROCARDIOGRAM TRACING: CPT

## 2018-10-10 PROCEDURE — 93010 ELECTROCARDIOGRAM REPORT: CPT | Mod: ,,, | Performed by: INTERNAL MEDICINE

## 2018-10-10 PROCEDURE — 86038 ANTINUCLEAR ANTIBODIES: CPT

## 2018-10-10 PROCEDURE — 82803 BLOOD GASES ANY COMBINATION: CPT

## 2018-10-10 PROCEDURE — 83605 ASSAY OF LACTIC ACID: CPT

## 2018-10-10 PROCEDURE — 86431 RHEUMATOID FACTOR QUANT: CPT

## 2018-10-10 PROCEDURE — 85007 BL SMEAR W/DIFF WBC COUNT: CPT

## 2018-10-10 PROCEDURE — 85652 RBC SED RATE AUTOMATED: CPT

## 2018-10-10 PROCEDURE — 96365 THER/PROPH/DIAG IV INF INIT: CPT

## 2018-10-10 PROCEDURE — 96366 THER/PROPH/DIAG IV INF ADDON: CPT

## 2018-10-10 PROCEDURE — 99285 EMERGENCY DEPT VISIT HI MDM: CPT

## 2018-10-10 PROCEDURE — 63600175 PHARM REV CODE 636 W HCPCS: Performed by: EMERGENCY MEDICINE

## 2018-10-10 PROCEDURE — 63700000 PHARM REV CODE 250 ALT 637 W/O HCPCS: Performed by: INTERNAL MEDICINE

## 2018-10-10 PROCEDURE — 86200 CCP ANTIBODY: CPT

## 2018-10-10 PROCEDURE — 11000001 HC ACUTE MED/SURG PRIVATE ROOM

## 2018-10-10 PROCEDURE — 84484 ASSAY OF TROPONIN QUANT: CPT

## 2018-10-10 PROCEDURE — 80053 COMPREHEN METABOLIC PANEL: CPT

## 2018-10-10 PROCEDURE — 63600175 PHARM REV CODE 636 W HCPCS: Performed by: INTERNAL MEDICINE

## 2018-10-10 PROCEDURE — 96375 TX/PRO/DX INJ NEW DRUG ADDON: CPT

## 2018-10-10 PROCEDURE — 87186 SC STD MICRODIL/AGAR DIL: CPT

## 2018-10-10 PROCEDURE — 99291 CRITICAL CARE FIRST HOUR: CPT | Mod: 25

## 2018-10-10 PROCEDURE — 85610 PROTHROMBIN TIME: CPT

## 2018-10-10 PROCEDURE — 85025 COMPLETE CBC W/AUTO DIFF WBC: CPT

## 2018-10-10 PROCEDURE — 87147 CULTURE TYPE IMMUNOLOGIC: CPT

## 2018-10-10 PROCEDURE — 83880 ASSAY OF NATRIURETIC PEPTIDE: CPT

## 2018-10-10 PROCEDURE — 85027 COMPLETE CBC AUTOMATED: CPT

## 2018-10-10 PROCEDURE — 86140 C-REACTIVE PROTEIN: CPT

## 2018-10-10 PROCEDURE — 87040 BLOOD CULTURE FOR BACTERIA: CPT | Mod: 59

## 2018-10-10 PROCEDURE — 36415 COLL VENOUS BLD VENIPUNCTURE: CPT

## 2018-10-10 RX ORDER — HEPARIN SODIUM 5000 [USP'U]/ML
5000 INJECTION, SOLUTION INTRAVENOUS; SUBCUTANEOUS EVERY 8 HOURS
Status: DISCONTINUED | OUTPATIENT
Start: 2018-10-10 | End: 2018-10-17 | Stop reason: HOSPADM

## 2018-10-10 RX ORDER — VANCOMYCIN HCL IN 5 % DEXTROSE 1G/250ML
1000 PLASTIC BAG, INJECTION (ML) INTRAVENOUS
Status: COMPLETED | OUTPATIENT
Start: 2018-10-10 | End: 2018-10-10

## 2018-10-10 RX ORDER — GLUCAGON 1 MG
1 KIT INJECTION
Status: DISCONTINUED | OUTPATIENT
Start: 2018-10-10 | End: 2018-10-17 | Stop reason: HOSPADM

## 2018-10-10 RX ORDER — NAPROXEN SODIUM 220 MG/1
81 TABLET, FILM COATED ORAL DAILY
Status: DISCONTINUED | OUTPATIENT
Start: 2018-10-11 | End: 2018-10-17 | Stop reason: HOSPADM

## 2018-10-10 RX ORDER — DORZOLAMIDE HYDROCHLORIDE AND TIMOLOL MALEATE 20; 5 MG/ML; MG/ML
1 SOLUTION/ DROPS OPHTHALMIC 3 TIMES DAILY
Status: DISCONTINUED | OUTPATIENT
Start: 2018-10-10 | End: 2018-10-17 | Stop reason: HOSPADM

## 2018-10-10 RX ORDER — INSULIN ASPART 100 [IU]/ML
1-10 INJECTION, SOLUTION INTRAVENOUS; SUBCUTANEOUS
Status: DISCONTINUED | OUTPATIENT
Start: 2018-10-10 | End: 2018-10-17 | Stop reason: HOSPADM

## 2018-10-10 RX ORDER — KETOROLAC TROMETHAMINE 5 MG/ML
1 SOLUTION OPHTHALMIC 3 TIMES DAILY
Status: DISCONTINUED | OUTPATIENT
Start: 2018-10-10 | End: 2018-10-17 | Stop reason: HOSPADM

## 2018-10-10 RX ORDER — IBUPROFEN 200 MG
16 TABLET ORAL
Status: DISCONTINUED | OUTPATIENT
Start: 2018-10-10 | End: 2018-10-17 | Stop reason: HOSPADM

## 2018-10-10 RX ORDER — BENAZEPRIL HYDROCHLORIDE 40 MG/1
40 TABLET ORAL DAILY
Status: DISCONTINUED | OUTPATIENT
Start: 2018-10-11 | End: 2018-10-17 | Stop reason: HOSPADM

## 2018-10-10 RX ORDER — MEROPENEM AND SODIUM CHLORIDE 1 G/50ML
1 INJECTION, SOLUTION INTRAVENOUS
Status: DISCONTINUED | OUTPATIENT
Start: 2018-10-10 | End: 2018-10-10

## 2018-10-10 RX ORDER — PREDNISOLONE ACETATE 10 MG/ML
1 SUSPENSION/ DROPS OPHTHALMIC 3 TIMES DAILY
Status: DISCONTINUED | OUTPATIENT
Start: 2018-10-10 | End: 2018-10-17 | Stop reason: HOSPADM

## 2018-10-10 RX ORDER — AMLODIPINE BESYLATE 5 MG/1
5 TABLET ORAL DAILY
Status: DISCONTINUED | OUTPATIENT
Start: 2018-10-11 | End: 2018-10-17 | Stop reason: HOSPADM

## 2018-10-10 RX ORDER — CLONIDINE HYDROCHLORIDE 0.1 MG/1
0.1 TABLET ORAL EVERY 4 HOURS PRN
Status: DISCONTINUED | OUTPATIENT
Start: 2018-10-10 | End: 2018-10-17 | Stop reason: HOSPADM

## 2018-10-10 RX ORDER — MEROPENEM AND SODIUM CHLORIDE 1 G/50ML
1 INJECTION, SOLUTION INTRAVENOUS
Status: DISCONTINUED | OUTPATIENT
Start: 2018-10-10 | End: 2018-10-13

## 2018-10-10 RX ORDER — IBUPROFEN 200 MG
24 TABLET ORAL
Status: DISCONTINUED | OUTPATIENT
Start: 2018-10-10 | End: 2018-10-17 | Stop reason: HOSPADM

## 2018-10-10 RX ORDER — ALLOPURINOL 100 MG/1
200 TABLET ORAL DAILY
Status: DISCONTINUED | OUTPATIENT
Start: 2018-10-11 | End: 2018-10-17 | Stop reason: HOSPADM

## 2018-10-10 RX ORDER — NAPROXEN SODIUM 220 MG/1
325 TABLET, FILM COATED ORAL
Status: COMPLETED | OUTPATIENT
Start: 2018-10-10 | End: 2018-10-10

## 2018-10-10 RX ORDER — BRIMONIDINE TARTRATE 1 MG/ML
1 SOLUTION/ DROPS OPHTHALMIC 3 TIMES DAILY
Status: DISCONTINUED | OUTPATIENT
Start: 2018-10-10 | End: 2018-10-17 | Stop reason: HOSPADM

## 2018-10-10 RX ORDER — EZETIMIBE 10 MG/1
10 TABLET ORAL DAILY
Status: DISCONTINUED | OUTPATIENT
Start: 2018-10-11 | End: 2018-10-17 | Stop reason: HOSPADM

## 2018-10-10 RX ORDER — MEROPENEM 1 G/1
1 INJECTION, POWDER, FOR SOLUTION INTRAVENOUS
Status: COMPLETED | OUTPATIENT
Start: 2018-10-10 | End: 2018-10-10

## 2018-10-10 RX ORDER — SODIUM CHLORIDE 9 MG/ML
1000 INJECTION, SOLUTION INTRAVENOUS
Status: COMPLETED | OUTPATIENT
Start: 2018-10-10 | End: 2018-10-10

## 2018-10-10 RX ADMIN — DORZOLAMIDE HYDROCHLORIDE AND TIMOLOL MALEATE 1 DROP: 20; 5 SOLUTION/ DROPS OPHTHALMIC at 10:10

## 2018-10-10 RX ADMIN — BRIMONIDINE TARTRATE 1 DROP: 1 SOLUTION/ DROPS OPHTHALMIC at 10:10

## 2018-10-10 RX ADMIN — INSULIN DETEMIR 20 UNITS: 100 INJECTION, SOLUTION SUBCUTANEOUS at 10:10

## 2018-10-10 RX ADMIN — SODIUM CHLORIDE 1000 ML: 0.9 INJECTION, SOLUTION INTRAVENOUS at 01:10

## 2018-10-10 RX ADMIN — PREDNISOLONE ACETATE 1 DROP: 10 SUSPENSION/ DROPS OPHTHALMIC at 10:10

## 2018-10-10 RX ADMIN — VANCOMYCIN HYDROCHLORIDE 1000 MG: 1 INJECTION, POWDER, LYOPHILIZED, FOR SOLUTION INTRAVENOUS at 01:10

## 2018-10-10 RX ADMIN — MEROPENEM AND SODIUM CHLORIDE 1 G: 1 INJECTION, SOLUTION INTRAVENOUS at 09:10

## 2018-10-10 RX ADMIN — ASPIRIN 81 MG 324 MG: 81 TABLET ORAL at 02:10

## 2018-10-10 RX ADMIN — KETOROLAC TROMETHAMINE 1 DROP: 5 SOLUTION/ DROPS OPHTHALMIC at 10:10

## 2018-10-10 RX ADMIN — HEPARIN SODIUM 5000 UNITS: 5000 INJECTION, SOLUTION INTRAVENOUS; SUBCUTANEOUS at 09:10

## 2018-10-10 RX ADMIN — MEROPENEM 1 G: 1 INJECTION, POWDER, FOR SOLUTION INTRAVENOUS at 01:10

## 2018-10-10 NOTE — ASSESSMENT & PLAN NOTE
Continue home allopurinol. Will need follow up as outpatient. He has progressive deformities of his hands. Does not appear to have an acute flare.

## 2018-10-10 NOTE — TELEPHONE ENCOUNTER
----- Message from Nabil Mcclendon sent at 10/10/2018 10:28 AM CDT -----  Contact: Chloé Menjivar (sister)    Name of Who is Calling: MELONY DASILVA [[id number]]      What is the request in detail: Requesting patient be seen today for bilateral leg swelling. Advised to go to Er or nearest Urgent Care. Would like to speak with PCP staff before doing anything.      Can the clinic reply by [facility]: no      What Number to Call Back if not in [facility]: [phone]

## 2018-10-10 NOTE — ED PROVIDER NOTES
Encounter Date: 10/10/2018       History     Chief Complaint   Patient presents with    Leg Swelling     pt reports he has been having left leg swelling x 3 days. redness noted to left leg. pt denies any recent trauma or injury    Hand Pain     pt reports right hand pain that began this morning. pt reports pain radiates to right wrist. denies any recent trauma or injury to hand or wrist     This is a 58-year-old male with history of hypertension, diabetes, hyperlipidemia, gout who comes in complaining of pain and swelling to his left leg.  Patient reports that for the past 3 days he has noticed some redness and swelling to the anterior shin area with occasional drainage.  His sister reports that yesterday it appeared that that swelling has improved but she noticed a blister on the bottom of his left foot.  That swelling has gotten significantly more pronounced over his foot so she brought him to the ER.  He reports that he has been unable to bear weight on his foot and leg today.  He reports subjective fevers and chills.  He denies any chest pain but reports that he is feeling more short of breath over the past few days.  He initially attributed the leg swelling to fluid as well as his shortness of breath. He does report dyspnea on exertion as well as orthopnea and PND.  He denies any focal weakness or numbness.  He denies any history of trauma to his leg.  No exacerbating or alleviating factors.  Patient also reports that he has been having right wrist pain for the past 3 days.  He denies any trauma to his wrist.  He reports that pain is over the 1st 3 fingers and is associated with numbness in those fingers.  He has not noted any swelling. He denies any warmth or erythema in that area.  He does report full range of motion.          Review of patient's allergies indicates:   Allergen Reactions    Statins-hmg-coa reductase inhibitors      Generalized Pain    Onglyza [saxagliptin]     Penicillins Rash     Past  Medical History:   Diagnosis Date    Arthritis     Diabetes mellitus     Diabetic retinopathy     Glaucoma     Gout     Hyperlipemia     Hypertension      Past Surgical History:   Procedure Laterality Date    AHMED GLAUCOMA IMPLANT Left 2011    DONE AT [facility]    BAERVELDT GLAUCOMA IMPLANT Left 2012    WITH CATARACT EXTRACTION//DONE AT [facility]    CATARACT EXTRACTION W/  INTRAOCULAR LENS IMPLANT Left 2012    WITH BAERVEDT//DONE AT [facility]    CATARACT EXTRACTION W/  INTRAOCULAR LENS IMPLANT Right 09/26/2018    COMPLEX ()    CB DESTRUCTION WITH CYCLO G6 Left 02/15/2017        CYST REMOVAL      HEART CATH-LEFT N/A 5/6/2016    Performed by Mike Magana MD at [facility] CATH LAB    INSERTION, IOL PROSTHESIS Right 9/26/2018    Performed by Perla Cortés MD at [facility] OR [address] [place] [address]    INTRAOCULAR PROSTHESES INSERTION Right 9/26/2018    Procedure: INSERTION, IOL PROSTHESIS;  Surgeon: Perla Cortés MD;  Location: [facility] OR [address] [place] [address];  Service: Ophthalmology;  Laterality: Right;    PHACOEMULSIFICATION OF CATARACT Right 9/26/2018    Procedure: PHACOEMULSIFICATION, CATARACT;  Surgeon: Perla Cortés MD;  Location: [facility] OR [address] [place] [address];  Service: Ophthalmology;  Laterality: Right;    PHACOEMULSIFICATION, CATARACT Right 9/26/2018    Performed by Perla Cortés MD at [facility] OR [address] [place] [address]    Right foot surgery  10/2014    TOE AMPUTATION      TONSILLECTOMY      TRABECULECTOMY/G 6 LASER Left 2/15/2017    Performed by Perla Cortés MD at [facility] OR [address] [place] [address]     Family History   Problem Relation Age of Onset    Diabetes Mother     Heart disease Brother     No Known Problems Father     No Known Problems Sister     No Known Problems Maternal Aunt     No Known Problems Maternal Uncle     No Known Problems Paternal Aunt     No Known Problems Paternal Uncle     No Known Problems Maternal Grandmother     No Known Problems Maternal Grandfather     No Known Problems Paternal  Grandmother     No Known Problems Paternal Grandfather     Anemia Neg Hx     Arrhythmia Neg Hx     Asthma Neg Hx     Clotting disorder Neg Hx     Fainting Neg Hx     Heart attack Neg Hx     Heart failure Neg Hx     Hyperlipidemia Neg Hx     Hypertension Neg Hx     Stroke Neg Hx     Atrial Septal Defect Neg Hx     Amblyopia Neg Hx     Blindness Neg Hx     Glaucoma Neg Hx     Macular degeneration Neg Hx     Retinal detachment Neg Hx     Strabismus Neg Hx      Social History     Tobacco Use    Smoking status: Former Smoker     Packs/day: 1.00     Years: 3.00     Pack years: 3.00     Last attempt to quit: 1984     Years since quittin.2    Smokeless tobacco: Never Used   Substance Use Topics    Alcohol use: Yes     Alcohol/week: 0.6 oz     Types: 1 Cans of beer per week     Comment: Occasional    Drug use: No     Review of Systems   Constitutional: Positive for chills and fever.   HENT: Negative for congestion, sore throat and trouble swallowing.    Respiratory: Positive for shortness of breath. Negative for cough.    Cardiovascular: Negative for chest pain and palpitations.   Gastrointestinal: Negative for abdominal pain, diarrhea, nausea and vomiting.   Musculoskeletal: Negative for back pain and neck pain.   Neurological: Positive for weakness. Negative for numbness and headaches.   All other systems reviewed and are negative.      Physical Exam     Initial Vitals [10/10/18 1215]   BP Pulse Resp Temp SpO2   93/65 104 17 97.8 °F (36.6 °C) --      MAP       --         Physical Exam    Nursing note and vitals reviewed.  Constitutional: Vital signs are normal. He appears well-developed and well-nourished.  Non-toxic appearance. No distress.   HENT:   Head: Normocephalic and atraumatic.   Mouth/Throat: Oropharynx is clear and moist.   Eyes: Conjunctivae and EOM are normal. Pupils are equal, round, and reactive to light.   Neck: Normal range of motion. Neck supple. No muscular tenderness  present. No neck rigidity.   Cardiovascular: Normal rate, regular rhythm and intact distal pulses.   Pulmonary/Chest:   Diminished breath sounds at bases.   Abdominal: Soft. Normal appearance and bowel sounds are normal. There is no tenderness.   Musculoskeletal:   Left leg with 2+ pitting edema with warmth and erythema noted over the anterior shin with some scaling of the skin.  Left foot with ecchymosis and blisters noted to the plantar and dorsal aspect.  Pulses not palpable but was dopplerable.  Tender to palpation. Full range of motion. Right wrist with TTP over scaphoid, no warmth or erythema. FROM, 2+ pulses   Lymphadenopathy:     He has no cervical adenopathy.     He has no axillary adenopathy.   Neurological: He is alert and oriented to person, place, and time. He has normal strength. No cranial nerve deficit or sensory deficit. Gait normal.   Skin: Skin is intact.   Skin as above   Psychiatric: He has a normal mood and affect. His behavior is normal.         ED Course   Critical Care  Date/Time: 10/10/2018 2:01 PM  Performed by: Sarai Hu MD  Authorized by: Sarai Hu MD   Direct patient critical care time: 15 minutes  Additional history critical care time: 5 minutes  Ordering / reviewing critical care time: 5 minutes  Documentation critical care time: 5 minutes  Consulting other physicians critical care time: 5 minutes  Other critical care time: 5 minutes  Total critical care time (exclusive of procedural time) : 40 minutes  Critical care time was exclusive of separately billable procedures and treating other patients.  Critical care was necessary to treat or prevent imminent or life-threatening deterioration of the following conditions: circulatory failure and sepsis.  Critical care was time spent personally by me on the following activities: blood draw for specimens, development of treatment plan with patient or surrogate, interpretation of cardiac output measurements, examination of patient,  ordering and performing treatments and interventions, ordering and review of radiographic studies, re-evaluation of patient's condition, review of old charts, pulse oximetry, ordering and review of laboratory studies, obtaining history from patient or surrogate, evaluation of patient's response to treatment and discussions with consultants.        Labs Reviewed   TROPONIN ISTAT - Abnormal; Notable for the following components:       Result Value    POC Cardiac Troponin I 0.23 (*)     All other components within normal limits   ISTAT PROCEDURE - Abnormal; Notable for the following components:    POC PO2 22 (*)     POC HCO3 21.8 (*)     POC SATURATED O2 36 (*)     POC Lactate 2.72 (*)     POC TCO2 23 (*)     All other components within normal limits   POCT CMP - Abnormal; Notable for the following components:    Albumin, POC 3.2 (*)     POC BUN 46 (*)     POC Creatinine 2.0 (*)     POC Glucose 257 (*)     POC Potassium 5.2 (*)     Bilirubin 1.9 (*)     All other components within normal limits   ISTAT PROCEDURE - Abnormal; Notable for the following components:    POC PTWBT 20.4 (*)     POC PTINR 1.7 (*)     All other components within normal limits   POCT B-TYPE NATRIURETIC PEPTIDE (BNP) - Abnormal; Notable for the following components:    POC B-Type Natriuretic Peptide 1480 (*)     All other components within normal limits   CULTURE, BLOOD   CULTURE, BLOOD   POCT CBC   POCT CMP   POCT PROTIME-INR   POCT TROPONIN   POCT B-TYPE NATRIURETIC PEPTIDE (BNP)     EKG Readings: (Independently Interpreted)   Time:  12:36 p.m.  Rate:  104 beats per minute.  Sinus tachycardia.  Anterior T-wave inversions and flattening.  These are new when compared to prior EKG 2016.       Imaging Results          X-Ray Foot Complete Left (Final result)  Result time 10/10/18 13:14:49    Final result by Tre Hunter Jr., MD (10/10/18 13:14:49)                 Impression:      Well-defined erosive changes as above.  Findings most consistent  with gout.  Correlation with clinical findings would be helpful.    Extensive vascular calcifications noted.      Electronically signed by: Tre Hunter MD  Date:    10/10/2018  Time:    13:14             Narrative:    EXAMINATION:  XR FOOT COMPLETE 3 VIEW LEFT    CLINICAL HISTORY:  .  Edema, unspecified    TECHNIQUE:  AP, lateral and oblique views of the left foot were performed.    COMPARISON:  None    FINDINGS:  Vascular calcifications noted.  There is some well-defined erosive change in the navicula as well as 1st metatarsal head and adjacent proximal phalanx.  Small defined lucency proximal phalanx of the 3rd toe.  No acute fracture seen.  There may be some erosion involving the distal aspect middle phalanx of the 2nd toe.  Some hypertrophic new bone about the tail 0 navicular and at the insertion of the plantar fascia.                               X-Ray Tibia Fibula 2 View Left (Final result)  Result time 10/10/18 13:16:42    Final result by Tre Hunter Jr., MD (10/10/18 13:16:42)                 Impression:      No significant abnormality.      Electronically signed by: Tre Hunter MD  Date:    10/10/2018  Time:    13:16             Narrative:    EXAMINATION:  XR TIBIA FIBULA 2 VIEW LEFT    CLINICAL HISTORY:  Edema, unspecified    TECHNIQUE:  AP and lateral views of the left tibia and fibula were performed.    COMPARISON:  None.    FINDINGS:  Tibia and fibula as visualized are intact.  Vascular calcifications noted.  No acute fracture.  The                               X-Ray Wrist Complete Right (Final result)     Abnormal  Result time 10/10/18 13:12:29    Final result by Tre Hunter Jr., MD (10/10/18 13:12:29)                 Impression:      There is significant abnormality with resorption or resection involving the majority of the lunate.  There also cystic lucencies in the navicula capitate trapezoid as well as likely distal radius and ulna.  Extensive vascular calcifications noted.   Correlation with the patient's history and clinical findings will be needed.    This report was flagged in Epic as abnormal.      Electronically signed by: Tre Hunter MD  Date:    10/10/2018  Time:    13:12             Narrative:    EXAMINATION:  XR WRIST COMPLETE 3 VIEWS RIGHT    CLINICAL HISTORY:  Pain, unspecified    TECHNIQUE:  PA, lateral, and oblique views of the right wrist were performed.    COMPARISON:  None    FINDINGS:  Vascular calcifications noted.  There is bony resorptive or resection changes involving the lunate.  Cystic lucencies in the navicula.  Degenerative changes about the carpal bones.  There is some erosive changes in the distal radius and ulna.  The no acute fracture seen.                                 Medical Decision Making:   Initial Assessment:   This is a 58-year-old male with history of diabetes  who comes in complaining of left lower extremity pain and swelling.  Patient is also complaining of shortness of breath.  On examination he has 2+ pitting edema of his left lower extremity with warmth and erythema over the anterior tib-fib as well as swelling and ecchymosis with mottling noted over the left foot.  Pulses are dopplerable.  He does have tenderness to palpation over his right wrist.  Exam is normal otherwise.  Orders included EKG, CBC, CMP, troponin, BNP, chest x-ray, lactic acid, blood cultures, x-rays of the foot and tib-fib as well as right wrist films.  He was given broad-spectrum IV antibiotics as well as IV fluids.  Differential Diagnosis:   Diabetic foot infection, osteomyelitis, necrotizing fasciitis, cellulitis, CHF exacerbation, ACS, PE, DVT, bacteremia, sepsis, severe sepsis electrolyte abnormality, LAYNE  Independently Interpreted Test(s):   I have ordered and independently interpreted X-rays - see summary below.       <> Summary of X-Ray Reading(s): X-rays of the foot and tib-fib were reviewed and showed no evidence of soft tissue gas.  Right wrist x-rays showed  significant changes to the lunate bone.  Chest x-ray was concerning for increased markings in the right lower lobe.  Clinical Tests:   Lab Tests: Ordered and Reviewed  The following lab test(s) were unremarkable: CBC, CMP, BNP, Troponin, Urinalysis and Lactate       <> Summary of Lab: Labs were reviewed were significant for white count of 13.8.  CMP was significant for potassium of 5.2 and glucose of 257.  CO2 was normal.  BUN and creatinine were 46 and 2.0 with prior baseline of 31 and 1.3.  Lactic acid was elevated at 2.72.  Patient's troponin was also elevated at 0.23 with a BNP 1480.  ED Management:  Patient on reexamination remains hemodynamically stable. On repeat exam he has new blisters on his foot that were not noted when I initially evaluated him.  DVT remains in the differential and patient will likely require ultrasound inpatient.  However despite the absence of soft tissue gas I am concerned the patient has a fairly aggressive diabetic foot infection.  He was initially given Merrem and vancomycin in light of penicillin allergy.  I am also concerned about sepsis in the setting of borderline hypotension and tachycardia with an elevated white count.  He meet severe sepsis criteria with 2 sirs criteria and a lactic acid greater than 2.  He does on review of medical records have a history of CHF as his EF on echo in 2016 was 35%.  That would certainly explain his BNP.  He is not currently having chest pain but does have a positive troponin with flattening of his T-waves anteriorly that was not present on a prior EKG in 2016.  This unclear if he has more recent EKG showing those changes.  He was given aspirin.  His creatinine is also elevated from baseline.  Patient had also been complaining of right wrist pain.  There is no signs of infection clinically on his wrist exam but he does have a grossly abnormal x-ray concerning for lucencies in abnormalities to the lunate which will require further workup as  well.  He will require precipitous transfer for admission and continued treatment.  Case was discussed with Dr. Estuardo Taylor who will admit the patient.                      Clinical Impression:   The primary encounter diagnosis was Left leg cellulitis. Diagnoses of Pain, Swelling, SOB (shortness of breath), Diabetic foot infection, LAYNE (acute kidney injury), NSTEMI (non-ST elevated myocardial infarction), and Sepsis, due to unspecified organism were also pertinent to this visit.      Disposition:   Disposition: Admitted  Condition: Manuel Hu MD  10/10/18 1357

## 2018-10-10 NOTE — NURSING
Received patient from [facility]. No acute distress noted. Oriented to person place and thing. Lower extremities bilaterally with rashes noted Left foot with blisters on top and sole of left foot

## 2018-10-10 NOTE — PLAN OF CARE
Problem: Patient Care Overview  Goal: Plan of Care Review  Outcome: Ongoing (interventions implemented as appropriate)   10/10/18 7310   Coping/Psychosocial   Plan Of Care Reviewed With patient   Oriented to person place and thing. Last . Only consumed a few bites then when to ultrasound. Blood cultures pending. Vital signs stable. Remains in bed since admit. Awaiting results from ultrasound

## 2018-10-10 NOTE — ASSESSMENT & PLAN NOTE
Given his tobacco history, previous poor wound healing, calcifications seen on Xray, and skin findings, PVD highly suspected. Vascular consulted.

## 2018-10-10 NOTE — ED NOTES
Doppler used to palpate for left foot pulse.  Pulse very faint.  During assessment of pulse, pt began to develop blister to left foot.  Pt denies any pain or discomfort at this time.  Denies any numbness at this time.

## 2018-10-10 NOTE — ED NOTES
Report called to JILLIAN Velasco regarding update on pt's condition.  [facility] services called and ETA is [phone].

## 2018-10-10 NOTE — HPI
Mr. Ray is a pleasant 57 yo man with IDDM (last A1c >14% 7/2018), essential HTN, HLD, gout, former tobacco abuse, and PVD with remote resection of the right 1st and 2nd toe who presented to the [facility] for right hand pain. He has chronic right hand pain from arthritis but he felt the pain had gotten much worse in the past 2-3 days. He describes a shooting, aching pain, particularly through his thumb, 2nd, and 3rd fingers. He has limited ROM due to arthritis but reports that this is not new. He also has limited wrist flexion and extension.   In the [facility] he was noted to have concerning findings of his left lower extremity. He has chronic woody appearance and hyperpigmentation of the skin on his LLE, but in the past couple of days the skin has been more erythematous. He also notes new edema of the LLE as well as a new blister on the dorsum of his foot. He had decreased pulses though they could be found with doppler.

## 2018-10-10 NOTE — ASSESSMENT & PLAN NOTE
LLE concerning for cellulitis. Blood cultures pending. Wound care consulted. Treating empirically with meropenem and vanc. Monitor leukocytosis for improvement. Lactate >2 in the ER. Follow.

## 2018-10-10 NOTE — ASSESSMENT & PLAN NOTE
Continue basal/bolus insulin. Adjust as needed. He has been seen in endocrine clinic. Continue to follow up with them as outpatient.

## 2018-10-10 NOTE — TELEPHONE ENCOUNTER
Contacted patient's sister, Chloé. She states his swelling is getting worse but wanted to consult with Dr. Davis before going to E.R. Explained to patient due to his symptoms it is better to go to the E.R. Instead of being worked in with another provider.     Patient on his way to E.R.

## 2018-10-10 NOTE — H&P
[facility] Hospital Medicine  History & Physical    Patient Name: Marvin Ray  MRN: [id number]  Admission Date: 10/10/2018  Attending Physician: Kirsten Trinh MD  Primary Care Provider: Rubén Davis MD         Patient information was obtained from patient, past medical records and ER records.     Subjective:     Principal Problem:Left leg cellulitis    Chief Complaint:   Chief Complaint   Patient presents with    Leg Swelling     pt reports he has been having left leg swelling x 3 days. redness noted to left leg. pt denies any recent trauma or injury    Hand Pain     pt reports right hand pain that began this morning. pt reports pain radiates to right wrist. denies any recent trauma or injury to hand or wrist        HPI: Mr. Ray is a pleasant 57 yo man with IDDM (last A1c >14% 7/2018), essential HTN, HLD, gout, former tobacco abuse, and PVD with remote resection of the right 1st and 2nd toe [facility]. He has chronic right hand pain from arthritis but he felt the pain had gotten much worse in the past 2-3 days. He describes a shooting, aching pain, particularly through his thumb, 2nd, and 3rd fingers. He has limited ROM due to arthritis but reports that this is not new. He also has limited wrist flexion and extension.   In the [facility] he was noted to have concerning findings of his left lower extremity. He has chronic woody appearance and hyperpigmentation of the skin on his LLE, but in the past couple of days the skin has been more erythematous. He also notes new edema of the LLE as well as a new blister on the dorsum of his foot. He had decreased pulses though they could be found with doppler.     Past Medical History:   Diagnosis Date    Arthritis     Diabetes mellitus     Diabetic retinopathy     Glaucoma     Gout     Hyperlipemia     Hypertension        Past Surgical History:   Procedure Laterality Date    AHMED GLAUCOMA  IMPLANT Left 2011    DONE AT [facility]    BAERVELDT GLAUCOMA IMPLANT Left 2012    WITH CATARACT EXTRACTION//DONE AT [facility]    CATARACT EXTRACTION W/  INTRAOCULAR LENS IMPLANT Left 2012    WITH BAERVEDT//DONE AT [facility]    CATARACT EXTRACTION W/  INTRAOCULAR LENS IMPLANT Right 09/26/2018    COMPLEX ()    CB DESTRUCTION WITH CYCLO G6 Left 02/15/2017        CYST REMOVAL      HEART CATH-LEFT N/A 5/6/2016    Performed by iMke Magana MD at [facility] CATH LAB    INSERTION, IOL PROSTHESIS Right 9/26/2018    Performed by Perla Cotrés MD at [facility] OR [address] [place] [address]    INTRAOCULAR PROSTHESES INSERTION Right 9/26/2018    Procedure: INSERTION, IOL PROSTHESIS;  Surgeon: Perla Cortés MD;  Location: [facility] OR [address] [place] [address];  Service: Ophthalmology;  Laterality: Right;    PHACOEMULSIFICATION OF CATARACT Right 9/26/2018    Procedure: PHACOEMULSIFICATION, CATARACT;  Surgeon: Perla Cortés MD;  Location: [facility] OR [address] [place] [address];  Service: Ophthalmology;  Laterality: Right;    PHACOEMULSIFICATION, CATARACT Right 9/26/2018    Performed by Perla Cortés MD at [facility] OR [address] [place] [address]    Right foot surgery  10/2014    TOE AMPUTATION      TONSILLECTOMY      TRABECULECTOMY/G 6 LASER Left 2/15/2017    Performed by Perla Cortés MD at [facility] OR [address] [place] [address]       Review of patient's allergies indicates:   Allergen Reactions    Statins-hmg-coa reductase inhibitors      Generalized Pain    Onglyza [saxagliptin]     Penicillins Rash       No current facility-administered medications on file prior to encounter.      Current Outpatient Medications on File Prior to Encounter   Medication Sig    allopurinol (ZYLOPRIM) 100 MG tablet Take 2 tablets (200 mg total) by mouth once daily.    amLODIPine (NORVASC) 5 MG tablet TAKE 1 TABLET(5 MG) BY MOUTH EVERY DAY    aspirin 81 MG Chew Take 81 mg by mouth once daily.    benazepril (LOTENSIN) 40 MG tablet TAKE 1 TABLET(40 MG) BY MOUTH TWICE DAILY    brimonidine 0.1%  "(ALPHAGAN P) 0.1 % Drop Place 1 drop into both eyes 3 (three) times daily.    coenzyme Q10 100 mg capsule Take 100 mg by mouth every morning.    dorzolamide-timolol 2-0.5% (COSOPT) 22.3-6.8 mg/mL ophthalmic solution Place 1 drop into both eyes 3 (three) times daily.    ezetimibe (ZETIA) 10 mg tablet Take 1 tablet (10 mg total) by mouth once daily.    fish oil-omega-3 fatty acids 300-1,000 mg capsule Take 2 capsules (2 g total) by mouth 2 (two) times daily. (Patient taking differently: Take 1 g by mouth 2 (two) times daily. )    insulin aspart U-100 (NOVOLOG) 100 unit/mL InPn pen Inject 12 Units into the skin 3 (three) times daily with meals.    insulin glargine-lixisenatide (SOLIQUA 100/33) 100 unit-33 mcg/mL InPn Inject 34 units once daily    ketorolac 0.5% (ACULAR) 0.5 % Drop Place 1 drop into the right eye 3 (three) times daily.    MULTIVIT,THER IRON,CA,FA & MIN (MULTIVITAMIN) Tab Take 1 tablet by mouth every morning.     nitroGLYCERIN (NITROSTAT) 0.4 MG SL tablet Place 1 tablet (0.4 mg total) under the tongue every 5 (five) minutes as needed for Chest pain (If CP persists go to ER. If taking NTG notify MD.).    pen needle, diabetic 31 gauge x 1/4" Ndle Use as directed    prednisoLONE acetate (PRED FORTE) 1 % DrpS Place 1 drop into the right eye 3 (three) times daily.    [DISCONTINUED] metFORMIN (GLUCOPHAGE) 1000 MG tablet TAKE 1 TABLET BY MOUTH TWICE DAILY WITH MEALS     Family History     Problem Relation (Age of Onset)    Diabetes Mother    Heart disease Brother    No Known Problems Father, Sister, Maternal Aunt, Maternal Uncle, Paternal Aunt, Paternal Uncle, Maternal Grandmother, Maternal Grandfather, Paternal Grandmother, Paternal Grandfather        Tobacco Use    Smoking status: Former Smoker     Packs/day: 1.00     Years: 3.00     Pack years: 3.00     Last attempt to quit: 1984     Years since quittin.2    Smokeless tobacco: Never Used   Substance and Sexual Activity    Alcohol " use: Yes     Alcohol/week: 0.6 oz     Types: 1 Cans of beer per week     Comment: Occasional    Drug use: No    Sexual activity: Not Currently     Review of Systems   Constitutional: Negative for chills and fever.   HENT: Negative for congestion and rhinorrhea.    Eyes: Negative for photophobia and visual disturbance.   Respiratory: Positive for shortness of breath. Negative for cough.    Cardiovascular: Positive for leg swelling. Negative for chest pain.   Gastrointestinal: Negative for abdominal pain, constipation, diarrhea and nausea.   Genitourinary: Negative for difficulty urinating and dysuria.   Musculoskeletal: Positive for arthralgias. Negative for gait problem and joint swelling.   Skin: Positive for color change and wound.   Neurological: Negative for dizziness and light-headedness.   Psychiatric/Behavioral: Negative for confusion and dysphoric mood.     Objective:     Vital Signs (Most Recent):  Temp: 98.4 °F (36.9 °C) (10/10/18 1735)  Pulse: 108 (10/10/18 1735)  Resp: 17 (10/10/18 1735)  BP: 125/76 (10/10/18 1735)  SpO2: (!) 94 % (10/10/18 1735) Vital Signs (24h Range):  Temp:  [97.8 °F (36.6 °C)-98.4 °F (36.9 °C)] 98.4 °F (36.9 °C)  Pulse:  [101-108] 108  Resp:  [17] 17  SpO2:  [89 %-97 %] 94 %  BP: ()/(56-76) 125/76     Weight: 97.5 kg (215 lb)  Body mass index is 30.85 kg/m².    Physical Exam   Constitutional: He is oriented to person, place, and time. He appears well-developed and well-nourished. No distress.   HENT:   Right Ear: External ear normal.   Left Ear: External ear normal.   Nose: Nose normal.   Mouth/Throat: Oropharynx is clear and moist.   Eyes: Conjunctivae and EOM are normal. Pupils are equal, round, and reactive to light. No scleral icterus.   Neck: Normal range of motion. Neck supple. No thyromegaly present.   Cardiovascular: Normal rate and normal heart sounds. Exam reveals no friction rub.   No murmur heard.  Pulmonary/Chest: Effort normal and breath sounds normal. No  respiratory distress. He has no wheezes. He has no rales.   Abdominal: Soft. Bowel sounds are normal. He exhibits no mass. There is no tenderness.   Obese   Musculoskeletal: He exhibits deformity. He exhibits no edema.   Bilateral hands with synovitis particularly of the PIPs right much worse than left and right medial MCPs. Limited ROM in the hands and wrist bilaterally.  Right great and second toe amputated, well healed.   Neurological: He is alert and oriented to person, place, and time. No cranial nerve deficit.   Skin: Skin is warm and dry. No pallor.   Left lower shin with woody appearance, hyperpigmented with erythema and calor. Sloughing skin, wet in appearance but without exudate.   Psychiatric: He has a normal mood and affect. His behavior is normal. Thought content normal.         CRANIAL NERVES     CN III, IV, VI   Pupils are equal, round, and reactive to light.  Extraocular motions are normal.         Significant Labs: All pertinent labs within the past 24 hours have been reviewed.    Significant Imaging: I have reviewed and interpreted all pertinent imaging results/findings within the past 24 hours.    Assessment/Plan:     * Left leg cellulitis    LLE concerning for cellulitis. Blood cultures pending. Wound care consulted. Treating empirically with meropenem and vanc. Monitor leukocytosis for improvement. Lactate >2 in the ER. Follow.        PVD (peripheral vascular disease)    Given his tobacco history, previous poor wound healing, calcifications seen on Xray, and skin findings, PVD highly suspected. Vascular consulted.        Essential hypertension    Continue home amlodipine and benazepril. PRN clonidine available.        Tophaceous gout    Continue home allopurinol. Will need follow up as outpatient. He has progressive deformities of his hands. Does not appear to have an acute flare.        Neovascular glaucoma of both eyes    Continue eye drops.        HLD (hyperlipidemia)    Continue Zetia.         DM type 2 with diabetic peripheral neuropathy    Continue basal/bolus insulin. Adjust as needed. He has been seen in endocrine clinic. Continue to follow up with them as outpatient.          VTE Risk Mitigation (From admission, onward)        Ordered     heparin (porcine) injection 5,000 Units  Every 8 hours      10/10/18 1710     IP VTE HIGH RISK PATIENT  Once      10/10/18 1710             Kirsten Trinh MD  Department of Hospital Medicine   [facility]

## 2018-10-10 NOTE — SUBJECTIVE & OBJECTIVE
Past Medical History:   Diagnosis Date    Arthritis     Diabetes mellitus     Diabetic retinopathy     Glaucoma     Gout     Hyperlipemia     Hypertension        Past Surgical History:   Procedure Laterality Date    AHMED GLAUCOMA IMPLANT Left 2011    DONE AT [facility]    BAERVELDT GLAUCOMA IMPLANT Left 2012    WITH CATARACT EXTRACTION//DONE AT [facility]    CATARACT EXTRACTION W/  INTRAOCULAR LENS IMPLANT Left 2012    WITH BAERVEDT//DONE AT [facility]    CATARACT EXTRACTION W/  INTRAOCULAR LENS IMPLANT Right 09/26/2018    COMPLEX ()    CB DESTRUCTION WITH CYCLO G6 Left 02/15/2017        CYST REMOVAL      HEART CATH-LEFT N/A 5/6/2016    Performed by Mike Magana MD at [facility] CATH LAB    INSERTION, IOL PROSTHESIS Right 9/26/2018    Performed by Perla Cortés MD at [facility] OR [address] [place] [address]    INTRAOCULAR PROSTHESES INSERTION Right 9/26/2018    Procedure: INSERTION, IOL PROSTHESIS;  Surgeon: Perla Cortés MD;  Location: [facility] OR [address] [place] [address];  Service: Ophthalmology;  Laterality: Right;    PHACOEMULSIFICATION OF CATARACT Right 9/26/2018    Procedure: PHACOEMULSIFICATION, CATARACT;  Surgeon: Perla Cortés MD;  Location: [facility] OR [address] [place] [address];  Service: Ophthalmology;  Laterality: Right;    PHACOEMULSIFICATION, CATARACT Right 9/26/2018    Performed by Perla Cortés MD at [facility] OR [address] [place] [address]    Right foot surgery  10/2014    TOE AMPUTATION      TONSILLECTOMY      TRABECULECTOMY/G 6 LASER Left 2/15/2017    Performed by Perla Cortés MD at [facility] OR [address] [place] [address]       Review of patient's allergies indicates:   Allergen Reactions    Statins-hmg-coa reductase inhibitors      Generalized Pain    Onglyza [saxagliptin]     Penicillins Rash       No current facility-administered medications on file prior to encounter.      Current Outpatient Medications on File Prior to Encounter   Medication Sig    allopurinol (ZYLOPRIM) 100 MG tablet Take 2 tablets (200 mg total) by mouth once  "daily.    amLODIPine (NORVASC) 5 MG tablet TAKE 1 TABLET(5 MG) BY MOUTH EVERY DAY    aspirin 81 MG Chew Take 81 mg by mouth once daily.    benazepril (LOTENSIN) 40 MG tablet TAKE 1 TABLET(40 MG) BY MOUTH TWICE DAILY    brimonidine 0.1% (ALPHAGAN P) 0.1 % Drop Place 1 drop into both eyes 3 (three) times daily.    coenzyme Q10 100 mg capsule Take 100 mg by mouth every morning.    dorzolamide-timolol 2-0.5% (COSOPT) 22.3-6.8 mg/mL ophthalmic solution Place 1 drop into both eyes 3 (three) times daily.    ezetimibe (ZETIA) 10 mg tablet Take 1 tablet (10 mg total) by mouth once daily.    fish oil-omega-3 fatty acids 300-1,000 mg capsule Take 2 capsules (2 g total) by mouth 2 (two) times daily. (Patient taking differently: Take 1 g by mouth 2 (two) times daily. )    insulin aspart U-100 (NOVOLOG) 100 unit/mL InPn pen Inject 12 Units into the skin 3 (three) times daily with meals.    insulin glargine-lixisenatide (SOLIQUA 100/33) 100 unit-33 mcg/mL InPn Inject 34 units once daily    ketorolac 0.5% (ACULAR) 0.5 % Drop Place 1 drop into the right eye 3 (three) times daily.    MULTIVIT,THER IRON,CA,FA & MIN (MULTIVITAMIN) Tab Take 1 tablet by mouth every morning.     nitroGLYCERIN (NITROSTAT) 0.4 MG SL tablet Place 1 tablet (0.4 mg total) under the tongue every 5 (five) minutes as needed for Chest pain (If CP persists go to ER. If taking NTG notify MD.).    pen needle, diabetic 31 gauge x 1/4" Ndle Use as directed    prednisoLONE acetate (PRED FORTE) 1 % DrpS Place 1 drop into the right eye 3 (three) times daily.    [DISCONTINUED] metFORMIN (GLUCOPHAGE) 1000 MG tablet TAKE 1 TABLET BY MOUTH TWICE DAILY WITH MEALS     Family History     Problem Relation (Age of Onset)    Diabetes Mother    Heart disease Brother    No Known Problems Father, Sister, Maternal Aunt, Maternal Uncle, Paternal Aunt, Paternal Uncle, Maternal Grandmother, Maternal Grandfather, Paternal Grandmother, Paternal Grandfather        Tobacco Use "    Smoking status: Former Smoker     Packs/day: 1.00     Years: 3.00     Pack years: 3.00     Last attempt to quit: 1984     Years since quittin.2    Smokeless tobacco: Never Used   Substance and Sexual Activity    Alcohol use: Yes     Alcohol/week: 0.6 oz     Types: 1 Cans of beer per week     Comment: Occasional    Drug use: No    Sexual activity: Not Currently     Review of Systems   Constitutional: Negative for chills and fever.   HENT: Negative for congestion and rhinorrhea.    Eyes: Negative for photophobia and visual disturbance.   Respiratory: Positive for shortness of breath. Negative for cough.    Cardiovascular: Positive for leg swelling. Negative for chest pain.   Gastrointestinal: Negative for abdominal pain, constipation, diarrhea and nausea.   Genitourinary: Negative for difficulty urinating and dysuria.   Musculoskeletal: Positive for arthralgias. Negative for gait problem and joint swelling.   Skin: Positive for color change and wound.   Neurological: Negative for dizziness and light-headedness.   Psychiatric/Behavioral: Negative for confusion and dysphoric mood.     Objective:     Vital Signs (Most Recent):  Temp: 98.4 °F (36.9 °C) (10/10/18 173)  Pulse: 108 (10/10/18 1735)  Resp: 17 (10/10/18 173)  BP: 125/76 (10/10/18 173)  SpO2: (!) 94 % (10/10/18 1735) Vital Signs (24h Range):  Temp:  [97.8 °F (36.6 °C)-98.4 °F (36.9 °C)] 98.4 °F (36.9 °C)  Pulse:  [101-108] 108  Resp:  [17] 17  SpO2:  [89 %-97 %] 94 %  BP: ()/(56-76) 125/76     Weight: 97.5 kg (215 lb)  Body mass index is 30.85 kg/m².    Physical Exam   Constitutional: He is oriented to person, place, and time. He appears well-developed and well-nourished. No distress.   HENT:   Right Ear: External ear normal.   Left Ear: External ear normal.   Nose: Nose normal.   Mouth/Throat: Oropharynx is clear and moist.   Eyes: Conjunctivae and EOM are normal. Pupils are equal, round, and reactive to light. No scleral icterus.    Neck: Normal range of motion. Neck supple. No thyromegaly present.   Cardiovascular: Normal rate and normal heart sounds. Exam reveals no friction rub.   No murmur heard.  Pulmonary/Chest: Effort normal and breath sounds normal. No respiratory distress. He has no wheezes. He has no rales.   Abdominal: Soft. Bowel sounds are normal. He exhibits no mass. There is no tenderness.   Obese   Musculoskeletal: He exhibits deformity. He exhibits no edema.   Bilateral hands with synovitis particularly of the PIPs right much worse than left and right medial MCPs. Limited ROM in the hands and wrist bilaterally.  Right great and second toe amputated, well healed.   Neurological: He is alert and oriented to person, place, and time. No cranial nerve deficit.   Skin: Skin is warm and dry. No pallor.   Left lower shin with woody appearance, hyperpigmented with erythema and calor. Sloughing skin, wet in appearance but without exudate.   Psychiatric: He has a normal mood and affect. His behavior is normal. Thought content normal.         CRANIAL NERVES     CN III, IV, VI   Pupils are equal, round, and reactive to light.  Extraocular motions are normal.         Significant Labs: All pertinent labs within the past 24 hours have been reviewed.    Significant Imaging: I have reviewed and interpreted all pertinent imaging results/findings within the past 24 hours.

## 2018-10-11 PROBLEM — M25.531 RIGHT WRIST PAIN: Status: ACTIVE | Noted: 2018-10-11

## 2018-10-11 PROBLEM — S81.809A MULTIPLE OPEN WOUNDS OF LOWER LEG: Status: ACTIVE | Noted: 2018-10-11

## 2018-10-11 LAB
ALBUMIN SERPL BCP-MCNC: 2.9 G/DL
ALP SERPL-CCNC: 115 U/L
ALT SERPL W/O P-5'-P-CCNC: 29 U/L
ANION GAP SERPL CALC-SCNC: 10 MMOL/L
AST SERPL-CCNC: 31 U/L
BASOPHILS # BLD AUTO: 0.01 K/UL
BASOPHILS NFR BLD: 0.1 %
BILIRUB SERPL-MCNC: 2 MG/DL
BUN SERPL-MCNC: 52 MG/DL
CALCIUM SERPL-MCNC: 9.2 MG/DL
CCP AB SER IA-ACNC: <0.5 U/ML
CHLORIDE SERPL-SCNC: 103 MMOL/L
CO2 SERPL-SCNC: 21 MMOL/L
CREAT SERPL-MCNC: 1.4 MG/DL
DIFFERENTIAL METHOD: ABNORMAL
EOSINOPHIL # BLD AUTO: 0 K/UL
EOSINOPHIL NFR BLD: 0 %
ERYTHROCYTE [DISTWIDTH] IN BLOOD BY AUTOMATED COUNT: 17.2 %
EST. GFR  (AFRICAN AMERICAN): >60 ML/MIN/1.73 M^2
EST. GFR  (NON AFRICAN AMERICAN): 54 ML/MIN/1.73 M^2
GLUCOSE SERPL-MCNC: 113 MG/DL
HCT VFR BLD AUTO: 39.2 %
HGB BLD-MCNC: 13.5 G/DL
LYMPHOCYTES # BLD AUTO: 0.8 K/UL
LYMPHOCYTES NFR BLD: 6.5 %
MCH RBC QN AUTO: 31.4 PG
MCHC RBC AUTO-ENTMCNC: 34.4 G/DL
MCV RBC AUTO: 91 FL
MONOCYTES # BLD AUTO: 0.9 K/UL
MONOCYTES NFR BLD: 7.6 %
NEUTROPHILS # BLD AUTO: 10.5 K/UL
NEUTROPHILS NFR BLD: 85.8 %
PLATELET # BLD AUTO: 166 K/UL
PMV BLD AUTO: 10.5 FL
POCT GLUCOSE: 116 MG/DL (ref 70–110)
POCT GLUCOSE: 190 MG/DL (ref 70–110)
POCT GLUCOSE: 198 MG/DL (ref 70–110)
POTASSIUM SERPL-SCNC: 4.7 MMOL/L
PROT SERPL-MCNC: 6.6 G/DL
RBC # BLD AUTO: 4.3 M/UL
RHEUMATOID FACT SERPL-ACNC: 12 IU/ML
SODIUM SERPL-SCNC: 134 MMOL/L
WBC # BLD AUTO: 12.18 K/UL

## 2018-10-11 PROCEDURE — 80053 COMPREHEN METABOLIC PANEL: CPT

## 2018-10-11 PROCEDURE — 11000001 HC ACUTE MED/SURG PRIVATE ROOM

## 2018-10-11 PROCEDURE — 63600175 PHARM REV CODE 636 W HCPCS: Performed by: INTERNAL MEDICINE

## 2018-10-11 PROCEDURE — 63600175 PHARM REV CODE 636 W HCPCS: Performed by: EMERGENCY MEDICINE

## 2018-10-11 PROCEDURE — 85025 COMPLETE CBC W/AUTO DIFF WBC: CPT

## 2018-10-11 PROCEDURE — 36415 COLL VENOUS BLD VENIPUNCTURE: CPT

## 2018-10-11 PROCEDURE — 25000003 PHARM REV CODE 250: Performed by: INTERNAL MEDICINE

## 2018-10-11 PROCEDURE — 99223 1ST HOSP IP/OBS HIGH 75: CPT | Mod: ,,, | Performed by: SURGERY

## 2018-10-11 PROCEDURE — 25000003 PHARM REV CODE 250: Performed by: EMERGENCY MEDICINE

## 2018-10-11 RX ADMIN — MEROPENEM AND SODIUM CHLORIDE 1 G: 1 INJECTION, SOLUTION INTRAVENOUS at 05:10

## 2018-10-11 RX ADMIN — HEPARIN SODIUM 5000 UNITS: 5000 INJECTION, SOLUTION INTRAVENOUS; SUBCUTANEOUS at 05:10

## 2018-10-11 RX ADMIN — KETOROLAC TROMETHAMINE 1 DROP: 5 SOLUTION/ DROPS OPHTHALMIC at 10:10

## 2018-10-11 RX ADMIN — INSULIN DETEMIR 20 UNITS: 100 INJECTION, SOLUTION SUBCUTANEOUS at 09:10

## 2018-10-11 RX ADMIN — KETOROLAC TROMETHAMINE 1 DROP: 5 SOLUTION/ DROPS OPHTHALMIC at 08:10

## 2018-10-11 RX ADMIN — INSULIN ASPART 2 UNITS: 100 INJECTION, SOLUTION INTRAVENOUS; SUBCUTANEOUS at 04:10

## 2018-10-11 RX ADMIN — ALLOPURINOL 200 MG: 100 TABLET ORAL at 08:10

## 2018-10-11 RX ADMIN — HEPARIN SODIUM 5000 UNITS: 5000 INJECTION, SOLUTION INTRAVENOUS; SUBCUTANEOUS at 02:10

## 2018-10-11 RX ADMIN — BENAZEPRIL HYDROCHLORIDE 40 MG: 40 TABLET, COATED ORAL at 08:10

## 2018-10-11 RX ADMIN — ASPIRIN 81 MG 81 MG: 81 TABLET ORAL at 08:10

## 2018-10-11 RX ADMIN — DORZOLAMIDE HYDROCHLORIDE AND TIMOLOL MALEATE 1 DROP: 20; 5 SOLUTION/ DROPS OPHTHALMIC at 08:10

## 2018-10-11 RX ADMIN — BRIMONIDINE TARTRATE 1 DROP: 1 SOLUTION/ DROPS OPHTHALMIC at 09:10

## 2018-10-11 RX ADMIN — BRIMONIDINE TARTRATE 1 DROP: 1 SOLUTION/ DROPS OPHTHALMIC at 08:10

## 2018-10-11 RX ADMIN — KETOROLAC TROMETHAMINE 1 DROP: 5 SOLUTION/ DROPS OPHTHALMIC at 02:10

## 2018-10-11 RX ADMIN — DORZOLAMIDE HYDROCHLORIDE AND TIMOLOL MALEATE 1 DROP: 20; 5 SOLUTION/ DROPS OPHTHALMIC at 10:10

## 2018-10-11 RX ADMIN — VANCOMYCIN HYDROCHLORIDE 1500 MG: 1 INJECTION, POWDER, LYOPHILIZED, FOR SOLUTION INTRAVENOUS at 12:10

## 2018-10-11 RX ADMIN — PREDNISOLONE ACETATE 1 DROP: 10 SUSPENSION/ DROPS OPHTHALMIC at 08:10

## 2018-10-11 RX ADMIN — PREDNISOLONE ACETATE 1 DROP: 10 SUSPENSION/ DROPS OPHTHALMIC at 02:10

## 2018-10-11 RX ADMIN — MULTIPLE VITAMINS W/ MINERALS TAB 1 TABLET: TAB at 08:10

## 2018-10-11 RX ADMIN — INSULIN ASPART 1 UNITS: 100 INJECTION, SOLUTION INTRAVENOUS; SUBCUTANEOUS at 09:10

## 2018-10-11 RX ADMIN — DORZOLAMIDE HYDROCHLORIDE AND TIMOLOL MALEATE 1 DROP: 20; 5 SOLUTION/ DROPS OPHTHALMIC at 02:10

## 2018-10-11 RX ADMIN — BRIMONIDINE TARTRATE 1 DROP: 1 SOLUTION/ DROPS OPHTHALMIC at 02:10

## 2018-10-11 RX ADMIN — HEPARIN SODIUM 5000 UNITS: 5000 INJECTION, SOLUTION INTRAVENOUS; SUBCUTANEOUS at 09:10

## 2018-10-11 RX ADMIN — PREDNISOLONE ACETATE 1 DROP: 10 SUSPENSION/ DROPS OPHTHALMIC at 10:10

## 2018-10-11 RX ADMIN — EZETIMIBE 10 MG: 10 TABLET ORAL at 08:10

## 2018-10-11 NOTE — PHYSICIAN QUERY
PT Name: Marvin Ray  MR #: [id number]    Physician Query Form - Emergency Medicine Diagnosis Clarification     Dia Vences RN CDS    Contact Information: [phone]    This form is a permanent document in the medical record.     Query Date: October 11, 2018    By submitting this query, we are merely seeking further clarification of documentation.  Please utilize your independent clinical judgment when addressing the question(s) below.      The Medical record contains the following:     Diagnosis Supporting Clinical Information Location in Medical Record   LAYNE (acute kidney injury   LAYNE (acute kidney injury  BUN and creatinine were 46 and 2.0 with prior baseline of 31 and 1.3.    BUN 48, 52    Creatinine 1.5, 1.4    GFR 50, 54 10/10 ED Prov note               10/10-10/11 Lab     Do you agree with the Emergency Medicine diagnosis of _AKI (acute kidney injury)___________?    [  ] Yes  [  ] Yes, but it resolved prior to my assessment of the patient  [ x ] No  [  ] Clinically Insignificant  [  ] Other/Clarification of Findings:_________________________________  [  ] Clinically Undetermined    Please document in your progress notes daily for the duration of treatment until resolved and include in your discharge summary.

## 2018-10-11 NOTE — ASSESSMENT & PLAN NOTE
LLE concerning for cellulitis. Blood cultures with Strep C but likely a contaminant - will discuss with micro. Wound care consulted. Treating empirically with meropenem and vanc. Monitor leukocytosis for improvement. Lactate 2.72 in the ER; repeat 3.0. He does have a markedly elevated CRP as well as an elevated ERS. Given clinical findings of cellulitis, continue empiric treatment with antibiotics for 7 days and follow up in clinic.

## 2018-10-11 NOTE — ASSESSMENT & PLAN NOTE
-LLE skin changes and cellulitis likely due to chronic venous hypertension and diabetes - recommend compression, elevation, dietary changes associated with water and sodium intake discussed at length with patient  -Rec wound care consult to improve skin integrity and protect skin

## 2018-10-11 NOTE — CONSULTS
A 58 year old male admitted 10/10/18 from home with left leg cellulitis; PVD; HTN; tophaceous gout; neovascular glaucoma of both eyes; HLD; DM II with diabetic peripheral neuropathy  PMH: Arthritis; DM; diabetic retinopathy; glaucoma; gout; HLD; HTN  10/11 WBC 12.18 Hgb 13.5 Hct 39.2 Alb 2.9      7/27 A1C >14.0  Vascular consult- ordered arterial US and SIDRA; skin changes and cellulitis likely due to chronic venous hypertension and diabetes-recommend compression and elevation  Consulted for left leg venous stasis ulcers  Assessment:  Photodocumentation    Blister left plantar foot- developed after wearing new Crocs    Left lower leg- Edematous. Draining large amount serous fluid.  Treatment:  Local wound care with Aquacel Extra. Will evaluate 10/11.

## 2018-10-11 NOTE — CONSULTS
[facility] [place]  Vascular Surgery  Consult Note    Inpatient consult to Vascular Surgery  Consult performed by: Mitch Chan MD  Consult ordered by: Kirsten Trinh MD  Reason for consult: LLE cellulitis        Subjective:     Chief Complaint/Reason for Admission: LLE cellulitis    History of Present Illness:             Mitch Chan MD [facility]                         10/11/2018    HPI:  Marvin Ray is a 58 y.o. 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  Patient states location is LLE occurring for 30 yrs.  Associated signs and symptoms include drainage and discoloration.  Quality is burning and severity is 5/10; states his feet hurt when he stands and walks but denies claudication.  +chills at home but no fevers.  Symptoms began 30 yrs ago; he has been a long term diabetic requiring R 1-2 toe amputations in [place] in the past.  No BLE arterial or venous surgeries.  Alleviating factors include rest.  Worsening factors include pressure.    no MI  no Stroke  Tobacco use: denies    Past Medical History:   Diagnosis Date    Arthritis     Diabetes  mellitus     Diabetic retinopathy     Glaucoma     Gout     Hyperlipemia     Hypertension      Past Surgical History:   Procedure Laterality Date    AHMED GLAUCOMA IMPLANT Left 2011    DONE AT [facility]    BAERVELDT GLAUCOMA IMPLANT Left 2012    WITH CATARACT EXTRACTION//DONE AT [facility]    CATARACT EXTRACTION W/  INTRAOCULAR LENS IMPLANT Left 2012    WITH BAERVEDT//DONE AT [facility]    CATARACT EXTRACTION W/  INTRAOCULAR LENS IMPLANT Right 09/26/2018    COMPLEX ()    CB DESTRUCTION WITH CYCLO G6 Left 02/15/2017        CYST REMOVAL      HEART CATH-LEFT N/A 5/6/2016    Performed by Mike [facility]ana MD at [facility] CATH LAB    INSERTION, IOL PROSTHESIS Right 9/26/2018    Performed by Perla Cortés MD at [facility] OR [address] [place] [address]    INTRAOCULAR PROSTHESES INSERTION Right 9/26/2018    Procedure: INSERTION, IOL PROSTHESIS;  Surgeon: Perla Cortés MD;  Location: [facility] OR [address] [place] [address];  Service: Ophthalmology;  Laterality: Right;    PHACOEMULSIFICATION OF CATARACT Right 9/26/2018    Procedure: PHACOEMULSIFICATION, CATARACT;  Surgeon: Perla Cortés MD;  Location: [facility] OR [address] [place] [address];  Service: Ophthalmology;  Laterality: Right;    PHACOEMULSIFICATION, CATARACT Right 9/26/2018    Performed by Perla Cortés MD at [facility] OR [address] [place] [address]    Right foot surgery  10/2014    TOE AMPUTATION      TONSILLECTOMY      TRABECULECTOMY/G 6 LASER Left 2/15/2017    Performed by Perla Cortés MD at [facility] OR [address] [place] [address]     Family History   Problem Relation Age of Onset    Diabetes Mother     Heart disease Brother     No Known Problems Father     No Known Problems Sister     No Known Problems Maternal Aunt     No Known Problems Maternal Uncle     No Known Problems Paternal Aunt     No Known Problems Paternal Uncle     No Known Problems Maternal Grandmother     No Known Problems Maternal Grandfather     No Known Problems Paternal Grandmother     No Known Problems Paternal Grandfather     Anemia  Neg Hx     Arrhythmia Neg Hx     Asthma Neg Hx     Clotting disorder Neg Hx     Fainting Neg Hx     Heart attack Neg Hx     Heart failure Neg Hx     Hyperlipidemia Neg Hx     Hypertension Neg Hx     Stroke Neg Hx     Atrial Septal Defect Neg Hx     Amblyopia Neg Hx     Blindness Neg Hx     Glaucoma Neg Hx     Macular degeneration Neg Hx     Retinal detachment Neg Hx     Strabismus Neg Hx      Social History     Socioeconomic History    Marital status: Legally      Spouse name: Not on file    Number of children: Not on file    Years of education: 14    Highest education level: Not on file   Social Needs    Financial resource strain: Not on file    Food insecurity - worry: Not on file    Food insecurity - inability: Not on file    Transportation needs - medical: Not on file    Transportation needs - non-medical: Not on file   Occupational History    Not on file   Tobacco Use    Smoking status: Former Smoker     Packs/day: 1.00     Years: 3.00     Pack years: 3.00     Last attempt to quit: 1984     Years since quittin.2    Smokeless tobacco: Never Used   Substance and Sexual Activity    Alcohol use: Yes     Alcohol/week: 0.6 oz     Types: 1 Cans of beer per week     Comment: Occasional    Drug use: No    Sexual activity: Not Currently   Other Topics Concern    Not on file   Social History Narrative    Not on file       Current Facility-Administered Medications:     allopurinol tablet 200 mg, 200 mg, Oral, Daily, Kirsten Trinh MD    amLODIPine tablet 5 mg, 5 mg, Oral, Daily, Kirsten Trinh MD    aspirin chewable tablet 81 mg, 81 mg, Oral, Daily, Kirsten Trinh MD    benazepril tablet 40 mg, 40 mg, Oral, Daily, Kirsten Trinh MD    brimonidine (ALPHAGAN P) ophthalmic solution 0.1%, 1 drop, Both Eyes, TID, Kirsten Trinh MD, 1 drop at 10/10/18 8702    cloNIDine tablet 0.1 mg, 0.1 mg, Oral, Q4H PRN, Kirsten Trinh MD    dextrose 50%  injection 12.5 g, 12.5 g, Intravenous, PRN, Kirsten Trinh MD    dextrose 50% injection 25 g, 25 g, Intravenous, PRN, Kirsten Trinh MD    dorzolamide-timolol 2-0.5% ophthalmic solution 1 drop, 1 drop, Both Eyes, TID, Kirsten Trinh MD, 1 drop at 10/10/18 2240    ezetimibe tablet 10 mg, 10 mg, Oral, Daily, Kirsten Trinh MD    glucagon (human recombinant) injection 1 mg, 1 mg, Intramuscular, PRN, Kirsten Trinh MD    glucose chewable tablet 16 g, 16 g, Oral, PRN, Kirsten Trinh MD    glucose chewable tablet 24 g, 24 g, Oral, PRN, Kirsten Trinh MD    heparin (porcine) injection 5,000 Units, 5,000 Units, Subcutaneous, Q8H, Sarai Hu MD, 5,000 Units at 10/11/18 0513    influenza (FLUZONE QUADRIVALENT) vaccine 0.5 mL, 0.5 mL, Intramuscular, Prior to discharge, Kirsten Trinh MD    insulin aspart U-100 pen 1-10 Units, 1-10 Units, Subcutaneous, QID (AC + HS) PRN, Kirsten Trinh MD    insulin detemir U-100 pen 20 Units, 20 Units, Subcutaneous, QHS, Kirsten Trinh MD, 20 Units at 10/10/18 2238    ketorolac 0.5% ophthalmic solution 1 drop, 1 drop, Right Eye, TID, Kirsten Trinh MD, 1 drop at 10/10/18 2239    meropenem-0.9% sodium chloride 1 g/50 mL IVPB, 1 g, Intravenous, Q12H, Sarai Hu MD, Last Rate: 50 mL/hr at 10/11/18 0513, 1 g at 10/11/18 0513    multivit-iron-FA-calcium-mins 9 mg iron-400 mcg tablet Tab 1 tablet, 1 tablet, Oral, QAM, Kirsten Trinh MD    prednisoLONE acetate 1 % ophthalmic suspension 1 drop, 1 drop, Right Eye, TID, Kirsten Trinh MD, 1 drop at 10/10/18 4002    vancomycin (VANCOCIN) 1,500 mg in dextrose 5 % 250 mL IVPB, 15 mg/kg, Intravenous, Q24H, Sarai Hu MD      Medications Prior to Admission   Medication Sig Dispense Refill Last Dose    allopurinol (ZYLOPRIM) 100 MG tablet Take 2 tablets (200 mg total) by mouth once daily. 180 tablet 3 Taking    amLODIPine (NORVASC) 5 MG tablet TAKE 1 TABLET(5 MG)  "BY MOUTH EVERY DAY 30 tablet 0 Taking    aspirin 81 MG Chew Take 81 mg by mouth once daily.   Taking    benazepril (LOTENSIN) 40 MG tablet TAKE 1 TABLET(40 MG) BY MOUTH TWICE DAILY 60 tablet 0 Taking    brimonidine 0.1% (ALPHAGAN P) 0.1 % Drop Place 1 drop into both eyes 3 (three) times daily. 15 mL 12 Taking    coenzyme Q10 100 mg capsule Take 100 mg by mouth every morning.   Taking    dorzolamide-timolol 2-0.5% (COSOPT) 22.3-6.8 mg/mL ophthalmic solution Place 1 drop into both eyes 3 (three) times daily. 10 mL 12 Taking    ezetimibe (ZETIA) 10 mg tablet Take 1 tablet (10 mg total) by mouth once daily. 30 tablet 2 Taking    fish oil-omega-3 fatty acids 300-1,000 mg capsule Take 2 capsules (2 g total) by mouth 2 (two) times daily. (Patient taking differently: Take 1 g by mouth 2 (two) times daily. ) 120 capsule 5 Taking    insulin aspart U-100 (NOVOLOG) 100 unit/mL InPn pen Inject 12 Units into the skin 3 (three) times daily with meals. 2 Box 11 Taking    insulin glargine-lixisenatide (SOLIQUA 100/33) 100 unit-33 mcg/mL InPn Inject 34 units once daily 5 Syringe 2 Taking    ketorolac 0.5% (ACULAR) 0.5 % Drop Place 1 drop into the right eye 3 (three) times daily. 1 Bottle 2 Taking    MULTIVIT,THER IRON,CA,FA & MIN (MULTIVITAMIN) Tab Take 1 tablet by mouth every morning.    Taking    nitroGLYCERIN (NITROSTAT) 0.4 MG SL tablet Place 1 tablet (0.4 mg total) under the tongue every 5 (five) minutes as needed for Chest pain (If CP persists go to ER. If taking NTG notify MD.). 20 tablet 1 Not Taking    pen needle, diabetic 31 gauge x 1/4" Ndle Use as directed 100 each 11 Taking    prednisoLONE acetate (PRED FORTE) 1 % DrpS Place 1 drop into the right eye 3 (three) times daily. 1 Bottle 2 Taking       Review of patient's allergies indicates:   Allergen Reactions    Statins-hmg-coa reductase inhibitors      Generalized Pain    Onglyza [saxagliptin]     Penicillins Rash       Past Medical History:   Diagnosis " Date    Arthritis     Diabetes mellitus     Diabetic retinopathy     Glaucoma     Gout     Hyperlipemia     Hypertension      Past Surgical History:   Procedure Laterality Date    AHMED GLAUCOMA IMPLANT Left     DONE AT [facility]    BAERVELDT GLAUCOMA IMPLANT Left     WITH CATARACT EXTRACTION//DONE AT [facility]    CATARACT EXTRACTION W/  INTRAOCULAR LENS IMPLANT Left 2012    WITH BAERVEDT//DONE AT [facility]    CATARACT EXTRACTION W/  INTRAOCULAR LENS IMPLANT Right 2018    COMPLEX ()    CB DESTRUCTION WITH CYCLO G6 Left 02/15/2017        CYST REMOVAL      HEART CATH-LEFT N/A 2016    [facility] CATH LAB    INSERTION, IOL PROSTHESIS Right 2018    Performed by Perla Cortés MD at [facility] OR [address] [place] [address]    INTRAOCULAR PROSTHESES INSERTION Right 2018    Procedure: INSERTION, IOL PROSTHESIS;  Surgeon: Perla Cortés MD;  Location: [facility] OR [address] [place] [address];  Service: Ophthalmology;  Laterality: Right;    PHACOEMULSIFICATION OF CATARACT Right 2018    Procedure: PHACOEMULSIFICATION, CATARACT;  Surgeon: Perla Cortés MD;  Location: [facility] OR [address] [place] [address];  Service: Ophthalmology;  Laterality: Right;    PHACOEMULSIFICATION, CATARACT Right 2018    Performed by Perla Cortés MD at [facility] OR [address] [place] [address]    Right foot surgery  10/2014    TOE AMPUTATION      TONSILLECTOMY      TRABECULECTOMY/G 6 LASER Left 2/15/2017    Performed by Perla Cortés MD at [facility] OR [address] [place] [address]     Family History     Problem Relation (Age of Onset)    Diabetes Mother    Heart disease Brother    No Known Problems Father, Sister, Maternal Aunt, Maternal Uncle, Paternal Aunt, Paternal Uncle, Maternal Grandmother, Maternal Grandfather, Paternal Grandmother, Paternal Grandfather        Tobacco Use    Smoking status: Former Smoker     Packs/day: 1.00     Years: 3.00     Pack years: 3.00     Last attempt to quit: 1984     Years since quittin.2     Smokeless tobacco: Never Used   Substance and Sexual Activity    Alcohol use: Yes     Alcohol/week: 0.6 oz     Types: 1 Cans of beer per week     Comment: Occasional    Drug use: No    Sexual activity: Not Currently     Review of Systems   Constitutional: Negative for fever.   HENT: Negative for congestion.    Eyes: Negative for visual disturbance.   Respiratory: Negative for chest tightness.    Cardiovascular: Negative for chest pain.   Gastrointestinal: Negative for abdominal distention.   Endocrine: Negative for polyuria.   Genitourinary: Negative for dysuria.   Musculoskeletal: Negative for back pain.   Skin: Positive for color change. Negative for pallor, rash and wound.   Allergic/Immunologic: Negative for immunocompromised state.   Neurological: Negative for dizziness.   Hematological: Does not bruise/bleed easily.   Psychiatric/Behavioral: Negative for agitation.     Objective:     Vital Signs (Most Recent):  Temp: 97.4 °F (36.3 °C) (10/11/18 0759)  Pulse: 97 (10/11/18 0759)  Resp: 18 (10/11/18 0759)  BP: 119/72 (10/11/18 0759)  SpO2: 95 % (10/11/18 0759) Vital Signs (24h Range):  Temp:  [97.4 °F (36.3 °C)-99.3 °F (37.4 °C)] 97.4 °F (36.3 °C)  Pulse:  [] 97  Resp:  [17-20] 18  SpO2:  [89 %-97 %] 95 %  BP: ()/(56-86) 119/72     Weight: 96.2 kg (212 lb)  Body mass index is 29.57 kg/m².    Physical Exam   Constitutional: He is oriented to person, place, and time. He appears well-developed and well-nourished. No distress.   HENT:   Head: Normocephalic and atraumatic.   Eyes: Conjunctivae are normal.   Neck: Neck supple.   Cardiovascular: Normal rate.   Pulses:       Radial pulses are 2+ on the right side, and 2+ on the left side.        Femoral pulses are 2+ on the right side, and 2+ on the left side.       Dorsalis pedis pulses are Detected w/ doppler on the right side, and Detected w/ doppler on the left side.        Posterior tibial pulses are Detected w/ doppler on the right side, and  Detected w/ doppler on the left side.   Pulmonary/Chest: Effort normal. He has no wheezes.   Abdominal: Soft. He exhibits no distension and no mass. There is no tenderness. There is no rebound and no guarding.   Musculoskeletal: Normal range of motion. He exhibits edema. He exhibits no tenderness or deformity.   Feet:   Right Foot:   Skin Integrity: Positive for skin breakdown, erythema and dry skin.   Left Foot:   Skin Integrity: Positive for dry skin. Negative for ulcer.   Neurological: He is alert and oriented to person, place, and time. A sensory deficit is present. No cranial nerve deficit.   Skin: Skin is warm. Capillary refill takes less than 2 seconds. No rash noted. He is diaphoretic. There is erythema. No pallor.   BLE venous stasis changes with skin breakdown to LLE with serous drainage and surrounding erythema, no purulence   Psychiatric: He has a normal mood and affect.   Vitals reviewed.      Significant Labs:  All pertinent labs from the last 24 hours have been reviewed.    Significant Diagnostics:  I have reviewed all pertinent imaging results/findings within the past 24 hours.    Assessment/Plan:     * Left leg cellulitis    -LLE skin changes and cellulitis likely due to chronic venous hypertension and diabetes - recommend compression, elevation, dietary changes associated with water and sodium intake discussed at length with patient  -Rec wound care consult to improve skin integrity and protect skin        PVD (peripheral vascular disease)    -will obtain arterial US and SIDRA; toe pressures if able        DM type 2 with diabetic peripheral neuropathy    -rec ADA diet and strict glycemic control            Thank you for your consult. I will follow-up with patient. Please contact us if you have any additional questions.    Mitch Chan MD  Vascular Surgery  [facility] [place]

## 2018-10-11 NOTE — HPI
Mitch Chan MD [facility]                         10/11/2018    HPI:  Marvin Ray is a 58 y.o. 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  Patient states location is LLE occurring for 30 yrs.  Associated signs and symptoms include drainage and discoloration.  Quality is burning and severity is 5/10; states his feet hurt when he stands and walks but denies claudication.  +chills at home but no fevers.  Symptoms began 30 yrs ago; he has been a long term diabetic requiring R 1-2 toe amputations in [place] in the past.  No BLE arterial or venous surgeries.  Alleviating factors include rest.  Worsening factors include pressure.    no MI  no Stroke  Tobacco use: denies    Past Medical History:   Diagnosis Date    Arthritis     Diabetes mellitus     Diabetic retinopathy     Glaucoma     Gout     Hyperlipemia     Hypertension      Past Surgical History:   Procedure Laterality Date    AHMED GLAUCOMA IMPLANT Left 2011    DONE AT [facility]    [facility]    WITH CATARACT EXTRACTION//DONE AT [facility]    CATARACT EXTRACTION W/  INTRAOCULAR LENS IMPLANT  Left 2012    WITH BAERVEDT//[facility]ONE [facility]    CATARACT EXTRACTION W/  INTRAOCULAR LENS IMPLANT Right 09/26/2018    COMPLEX ()    CB DESTRUCTION WITH CYCLO G6 Left 02/15/2017        CYST REMOVAL      HEART CATH-LEFT N/A 5/6/2016    Performed by Mike [facility] MD at [facility] CATH LAB    INSERTION, IOL PROSTHESIS Right 9/26/2018    Performed by Perla Cortés MD at [facility] OR [address] [place] [address]    INTRAOCULAR PROSTHESES INSERTION Right 9/26/2018    Procedure: INSERTION, IOL PROSTHESIS;  Surgeon: Perla Cortés MD;  Location: [facility] OR [address] [place] [address];  Service: Ophthalmology;  Laterality: Right;    PHACOEMULSIFICATION OF CATARACT Right 9/26/2018    Procedure: PHACOEMULSIFICATION, CATARACT;  Surgeon: Perla Cortés MD;  Location: [facility] OR [address] [place] [address];  Service: Ophthalmology;  Laterality: Right;    PHACOEMULSIFICATION, CATARACT Right 9/26/2018    Performed by Perla Cortés MD at [facility] OR [address] [place] [address]    Right foot surgery  10/2014    TOE AMPUTATION      TONSILLECTOMY      TRABECULECTOMY/G 6 LASER Left 2/15/2017    Performed by Perla Cortés MD at [facility] OR [address] [place] [address]     Family History   Problem Relation Age of Onset    Diabetes Mother     Heart disease Brother     No Known Problems Father     No Known Problems Sister     No Known Problems Maternal Aunt     No Known Problems Maternal Uncle     No Known Problems Paternal Aunt     No Known Problems Paternal Uncle     No Known Problems Maternal Grandmother     No Known Problems Maternal Grandfather     No Known Problems Paternal Grandmother     No Known Problems Paternal Grandfather     Anemia Neg Hx     Arrhythmia Neg Hx     Asthma Neg Hx     Clotting disorder Neg Hx     Fainting Neg Hx     Heart attack Neg Hx     Heart failure Neg Hx     Hyperlipidemia Neg Hx     Hypertension Neg Hx     Stroke Neg Hx     Atrial Septal Defect Neg Hx     Amblyopia Neg Hx     Blindness Neg Hx     Glaucoma Neg Hx     Macular degeneration  Neg Hx     Retinal detachment Neg Hx     Strabismus Neg Hx      Social History     Socioeconomic History    Marital status: Legally      Spouse name: Not on file    Number of children: Not on file    Years of education: 14    Highest education level: Not on file   Social Needs    Financial resource strain: Not on file    Food insecurity - worry: Not on file    Food insecurity - inability: Not on file    Transportation needs - medical: Not on file    Transportation needs - non-medical: Not on file   Occupational History    Not on file   Tobacco Use    Smoking status: Former Smoker     Packs/day: 1.00     Years: 3.00     Pack years: 3.00     Last attempt to quit: 1984     Years since quittin.2    Smokeless tobacco: Never Used   Substance and Sexual Activity    Alcohol use: Yes     Alcohol/week: 0.6 oz     Types: 1 Cans of beer per week     Comment: Occasional    Drug use: No    Sexual activity: Not Currently   Other Topics Concern    Not on file   Social History Narrative    Not on file       Current Facility-Administered Medications:     allopurinol tablet 200 mg, 200 mg, Oral, Daily, Kirsten Trinh MD    amLODIPine tablet 5 mg, 5 mg, Oral, Daily, Kirsten Trinh MD    aspirin chewable tablet 81 mg, 81 mg, Oral, Daily, Kirsten Trinh MD    benazepril tablet 40 mg, 40 mg, Oral, Daily, Kirsten Trinh MD    brimonidine (ALPHAGAN P) ophthalmic solution 0.1%, 1 drop, Both Eyes, TID, Kirsten Trinh MD, 1 drop at 10/10/18 2241    cloNIDine tablet 0.1 mg, 0.1 mg, Oral, Q4H PRN, Kirsten Trinh MD    dextrose 50% injection 12.5 g, 12.5 g, Intravenous, PRN, Kirsten Trinh MD    dextrose 50% injection 25 g, 25 g, Intravenous, PRN, Kirsten Trinh MD    dorzolamide-timolol 2-0.5% ophthalmic solution 1 drop, 1 drop, Both Eyes, TID, Kirsten Trinh MD, 1 drop at 10/10/18 224    ezetimibe tablet 10 mg, 10 mg, Oral, Daily, Kirsten Trinh  MD    glucagon (human recombinant) injection 1 mg, 1 mg, Intramuscular, PRN, Kirsten Trinh MD    glucose chewable tablet 16 g, 16 g, Oral, PRN, Kirsten Trinh MD    glucose chewable tablet 24 g, 24 g, Oral, PRN, Kirsten Trinh MD    heparin (porcine) injection 5,000 Units, 5,000 Units, Subcutaneous, Q8H, Sarai Hu MD, 5,000 Units at 10/11/18 0513    influenza (FLUZONE QUADRIVALENT) vaccine 0.5 mL, 0.5 mL, Intramuscular, Prior to discharge, Kirsten Trinh MD    insulin aspart U-100 pen 1-10 Units, 1-10 Units, Subcutaneous, QID (AC + HS) PRN, Kirsten Trinh MD    insulin detemir U-100 pen 20 Units, 20 Units, Subcutaneous, QHS, Kirsten Trinh MD, 20 Units at 10/10/18 2238    ketorolac 0.5% ophthalmic solution 1 drop, 1 drop, Right Eye, TID, Kirsten Trinh MD, 1 drop at 10/10/18 2239    meropenem-0.9% sodium chloride 1 g/50 mL IVPB, 1 g, Intravenous, Q12H, Sarai Hu MD, Last Rate: 50 mL/hr at 10/11/18 0513, 1 g at 10/11/18 0513    multivit-iron-FA-calcium-mins 9 mg iron-400 mcg tablet Tab 1 tablet, 1 tablet, Oral, QAM, Kirsten Trinh MD    prednisoLONE acetate 1 % ophthalmic suspension 1 drop, 1 drop, Right Eye, TID, Kirsten Trinh MD, 1 drop at 10/10/18 2242    vancomycin (VANCOCIN) 1,500 mg in dextrose 5 % 250 mL IVPB, 15 mg/kg, Intravenous, Q24H, Sarai Hu MD

## 2018-10-11 NOTE — HOSPITAL COURSE
Mr. Ray was admitted due to concern for LLE cellulitis. Per the [place] ER physician, she found his exam to be concerning for necrotizing fasciitis, however upon my exam I had very low clinical concern for this. He did appear to have chronic venous insufficiency and likely PVD. Vascular was consulted and thought his peripheral blood flow was sufficient without current need for intervention, however the patient does need to follow up with vascular once his cellulitis has improved.  He had chronic hyperpigmentation of his LLE c/w chronic venous stasis with an overlying area of erythema and calor concerning for cellulitis. He was started on empiric antibiotics with meropenam and vancomycin (penicillin allergic). He did have a slightly elevated lactate that remained stable but no other signs concerning for sepsis on admission. Blood cultures grew group G Strep. Antibiotics narrowed to ceftriaxone with final susceptibility results on 10/13/18. Wound care followed during admission. His leg cellulitis appeared to improve with antibiotics, but he did have a large blister on his foot that continued to expand. He will need aggressive wound care after discharge. The wound was worsening again on 10/15; the LLE was wrapped with coban and re-evaluated the following morning. His wounds were improved - further wound care recs provided.  He was found to have slightly elevated LFTs during admission. Abdominal US showed hepatosplenomegaly, ascites, and a right pleural effusion. GI consulted. Dx paracentesis with IR on 10/15/18 w/ SAAG 0.9 but not enough fluid able to be collected for cell count. The amount of ascites was not enough to provide sufficient sample. His soft tissue edema was more substantial. He has a history of heart failure. Repeat echo showed EF 25% and G3DD. Cardiology was consulted. NST 10/17/2018 showed a moderate amount of infarct in the inferior wall(s) in the typical distribution of the RCA territory. He also  had a post stress EF of 17%. Cardiology recommended medication adjustments during admission and wants the patient to follow up in clinic in 1 week.

## 2018-10-11 NOTE — PLAN OF CARE
10/11/18 1430   Discharge Assessment   Assessment Type Discharge Planning Assessment   Assessment information obtained from? Medical Record   Prior to hospitilization cognitive status: Alert/Oriented   Prior to hospitalization functional status: Independent   Current cognitive status: Alert/Oriented   Current Functional Status: Independent   Lives With spouse   Is patient able to care for self after discharge? Yes   Who are your caregiver(s) and their phone number(s)? Chloé Tiara- [phone]   Patient's perception of discharge disposition home or selfcare   Patient currently being followed by outpatient case management? No   Patient currently receives any other outside agency services? No   Equipment Currently Used at Home none   Do you have any problems affording any of your prescribed medications? No   Is the patient taking medications as prescribed? yes   Does the patient have transportation home? Yes   Transportation Available family or friend will provide;car   Discharge Plan A Home with family   Discharge Plan B Home with family;Home Health   Patient/Family In Agreement With Plan yes     [employer] [address] [place] [address] RADHA GREEN  [address] AT [facility]  [address] [facility] [place] [address]  Phone: [phone] Fax: [phone]

## 2018-10-11 NOTE — PLAN OF CARE
Problem: Patient Care Overview  Goal: Plan of Care Review   10/11/18 154   Coping/Psychosocial   Plan Of Care Reviewed With patient       Comments: Plan of care reviewed with patient and wife. Patient is alert and oriented, able to make needs known, remains free of injury during shift. Vss, afebrile, patient denies pain thru shift. Patient positions self, ambulates to bathroom without assistance, however states the bottom of his feet are sensitive and tender. accucheck done per order, patient wnl and no insulin necessary. Iv fluids infusing per order. Wounds to bilat legs open to air with small amounts of nonodorous drainage noted, he is waiting to be seen by wound care nurse. Tele monitor in place. Will continue to monitor for safety.

## 2018-10-11 NOTE — ASSESSMENT & PLAN NOTE
Given his tobacco history, previous poor wound healing, calcifications seen on Xray, and skin findings, PVD highly suspected. Vascular consulted, recs appreciated.

## 2018-10-11 NOTE — NURSING
Met with mother and [facility]. A1C today 5.9% was 6.0 in May 2018.     Weight down 6#.    Lantus dose 10 units in AM and Metformin 100 mg BID    ( was started on Tresiba but insurance does not cover unless failure on other product) Report received from JILLIAN Chavez. Care resumed

## 2018-10-11 NOTE — PLAN OF CARE
Problem: Patient Care Overview  Goal: Plan of Care Review  Outcome: Ongoing (interventions implemented as appropriate)  Pt remains free of falls and injuries. Cellulitis LLE with blister to foot and popped blisters to posterior leg. Erythema noted. Per pt and family, legs looking better. Non skid socks on. +BC called in to Dr. Ellis. No new orders. Cont on current abx. Denies any pain. Remains on tele box #[id number], slightly tachy. Ambulates independently to restroom, but has been encouraged to call for assistance OOB. No other complaints at this time.

## 2018-10-11 NOTE — SUBJECTIVE & OBJECTIVE
"Medications Prior to Admission   Medication Sig Dispense Refill Last Dose    allopurinol (ZYLOPRIM) 100 MG tablet Take 2 tablets (200 mg total) by mouth once daily. 180 tablet 3 Taking    amLODIPine (NORVASC) 5 MG tablet TAKE 1 TABLET(5 MG) BY MOUTH EVERY DAY 30 tablet 0 Taking    aspirin 81 MG Chew Take 81 mg by mouth once daily.   Taking    benazepril (LOTENSIN) 40 MG tablet TAKE 1 TABLET(40 MG) BY MOUTH TWICE DAILY 60 tablet 0 Taking    brimonidine 0.1% (ALPHAGAN P) 0.1 % Drop Place 1 drop into both eyes 3 (three) times daily. 15 mL 12 Taking    coenzyme Q10 100 mg capsule Take 100 mg by mouth every morning.   Taking    dorzolamide-timolol 2-0.5% (COSOPT) 22.3-6.8 mg/mL ophthalmic solution Place 1 drop into both eyes 3 (three) times daily. 10 mL 12 Taking    ezetimibe (ZETIA) 10 mg tablet Take 1 tablet (10 mg total) by mouth once daily. 30 tablet 2 Taking    fish oil-omega-3 fatty acids 300-1,000 mg capsule Take 2 capsules (2 g total) by mouth 2 (two) times daily. (Patient taking differently: Take 1 g by mouth 2 (two) times daily. ) 120 capsule 5 Taking    insulin aspart U-100 (NOVOLOG) 100 unit/mL InPn pen Inject 12 Units into the skin 3 (three) times daily with meals. 2 Box 11 Taking    insulin glargine-lixisenatide (SOLIQUA 100/33) 100 unit-33 mcg/mL InPn Inject 34 units once daily 5 Syringe 2 Taking    ketorolac 0.5% (ACULAR) 0.5 % Drop Place 1 drop into the right eye 3 (three) times daily. 1 Bottle 2 Taking    MULTIVIT,THER IRON,CA,FA & MIN (MULTIVITAMIN) Tab Take 1 tablet by mouth every morning.    Taking    nitroGLYCERIN (NITROSTAT) 0.4 MG SL tablet Place 1 tablet (0.4 mg total) under the tongue every 5 (five) minutes as needed for Chest pain (If CP persists go to ER. If taking NTG notify MD.). 20 tablet 1 Not Taking    pen needle, diabetic 31 gauge x 1/4" Ndle Use as directed 100 each 11 Taking    prednisoLONE acetate (PRED FORTE) 1 % DrpS Place 1 drop into the right eye 3 (three) times " daily. 1 Bottle 2 Taking       Review of patient's allergies indicates:   Allergen Reactions    Statins-hmg-coa reductase inhibitors      Generalized Pain    Onglyza [saxagliptin]     Penicillins Rash       Past Medical History:   Diagnosis Date    Arthritis     Diabetes mellitus     Diabetic retinopathy     Glaucoma     Gout     Hyperlipemia     Hypertension      Past Surgical History:   Procedure Laterality Date    AHMED GLAUCOMA IMPLANT Left 2011    [facility]ONE AT [facility]    BAERVELDT GLAUCOMA IMPLANT Left 2012    WITH CATARACT EXTRACTION//DONE AT [facility]    CATARACT EXTRACTION W/  INTRAOCULAR LENS IMPLANT Left 2012    WITH BAERVEDT//DONE AT [facility]    CATARACT EXTRACTION W/  INTRAOCULAR LENS IMPLANT Right 09/26/2018    COMPLEX ()    CB DESTRUCTION WITH CYCLO G6 Left 02/15/2017        CYST REMOVAL      HEART CATH-LEFT N/A 5/6/2016    Performed by Mike Magana MD at [facility] CATH LAB    INSERTION, IOL PROSTHESIS Right 9/26/2018    Performed by Perla Cortés MD at [facility] OR [address] [place] [address]    INTRAOCULAR PROSTHESES INSERTION Right 9/26/2018    Procedure: INSERTION, IOL PROSTHESIS;  Surgeon: Perla Cortés MD;  Location: [facility] OR [address] [place] [address];  Service: Ophthalmology;  Laterality: Right;    PHACOEMULSIFICATION OF CATARACT Right 9/26/2018    Procedure: PHACOEMULSIFICATION, CATARACT;  Surgeon: Perla Cortés MD;  Location: [facility] OR [address] [place] [address];  Service: Ophthalmology;  Laterality: Right;    PHACOEMULSIFICATION, CATARACT Right 9/26/2018    Performed by Perla Cortés MD at [facility] OR [address] [place] [address]    Right foot surgery  10/2014    TOE AMPUTATION      TONSILLECTOMY      TRABECULECTOMY/G 6 LASER Left 2/15/2017    Performed by Perla Cortés MD at [facility] OR [address] [place] [address]     Family History     Problem Relation (Age of Onset)    Diabetes Mother    Heart disease Brother    No Known Problems Father, Sister, Maternal Aunt, Maternal Uncle, Paternal Aunt, Paternal Uncle, Maternal  Grandmother, Maternal Grandfather, Paternal Grandmother, Paternal Grandfather        Tobacco Use    Smoking status: Former Smoker     Packs/day: 1.00     Years: 3.00     Pack years: 3.00     Last attempt to quit: 1984     Years since quittin.2    Smokeless tobacco: Never Used   Substance and Sexual Activity    Alcohol use: Yes     Alcohol/week: 0.6 oz     Types: 1 Cans of beer per week     Comment: Occasional    Drug use: No    Sexual activity: Not Currently     Review of Systems   Constitutional: Negative for fever.   HENT: Negative for congestion.    Eyes: Negative for visual disturbance.   Respiratory: Negative for chest tightness.    Cardiovascular: Negative for chest pain.   Gastrointestinal: Negative for abdominal distention.   Endocrine: Negative for polyuria.   Genitourinary: Negative for dysuria.   Musculoskeletal: Negative for back pain.   Skin: Positive for color change. Negative for pallor, rash and wound.   Allergic/Immunologic: Negative for immunocompromised state.   Neurological: Negative for dizziness.   Hematological: Does not bruise/bleed easily.   Psychiatric/Behavioral: Negative for agitation.     Objective:     Vital Signs (Most Recent):  Temp: 97.4 °F (36.3 °C) (10/11/18 0759)  Pulse: 97 (10/11/18 0759)  Resp: 18 (10/11/18 0759)  BP: 119/72 (10/11/18 0759)  SpO2: 95 % (10/11/18 0759) Vital Signs (24h Range):  Temp:  [97.4 °F (36.3 °C)-99.3 °F (37.4 °C)] 97.4 °F (36.3 °C)  Pulse:  [] 97  Resp:  [17-20] 18  SpO2:  [89 %-97 %] 95 %  BP: ()/(56-86) 119/72     Weight: 96.2 kg (212 lb)  Body mass index is 29.57 kg/m².    Physical Exam   Constitutional: He is oriented to person, place, and time. He appears well-developed and well-nourished. No distress.   HENT:   Head: Normocephalic and atraumatic.   Eyes: Conjunctivae are normal.   Neck: Neck supple.   Cardiovascular: Normal rate.   Pulses:       Radial pulses are 2+ on the right side, and 2+ on the left side.         Femoral pulses are 2+ on the right side, and 2+ on the left side.       Dorsalis pedis pulses are Detected w/ doppler on the right side, and Detected w/ doppler on the left side.        Posterior tibial pulses are Detected w/ doppler on the right side, and Detected w/ doppler on the left side.   Pulmonary/Chest: Effort normal. He has no wheezes.   Abdominal: Soft. He exhibits no distension and no mass. There is no tenderness. There is no rebound and no guarding.   Musculoskeletal: Normal range of motion. He exhibits edema. He exhibits no tenderness or deformity.   Feet:   Right Foot:   Skin Integrity: Positive for skin breakdown, erythema and dry skin.   Left Foot:   Skin Integrity: Positive for dry skin. Negative for ulcer.   Neurological: He is alert and oriented to person, place, and time. A sensory deficit is present. No cranial nerve deficit.   Skin: Skin is warm. Capillary refill takes less than 2 seconds. No rash noted. He is diaphoretic. There is erythema. No pallor.   BLE venous stasis changes with skin breakdown to LLE with serous drainage and surrounding erythema, no purulence   Psychiatric: He has a normal mood and affect.   Vitals reviewed.      Significant Labs:  All pertinent labs from the last 24 hours have been reviewed.    Significant Diagnostics:  I have reviewed all pertinent imaging results/findings within the past 24 hours.

## 2018-10-11 NOTE — PROGRESS NOTES
[facility] Medicine  Progress Note    Patient Name: Marvin Ray  MRN: [id number]  Patient Class: IP- Inpatient   Admission Date: 10/10/2018  Length of Stay: 1 days  Attending Physician: Kirsten Trinh MD  Primary Care Provider: Rubén Davis MD        Subjective:     Principal Problem:Left leg cellulitis    HPI:  Mr. Ray is a pleasant 57 yo man with IDDM (last A1c >14% 7/2018), essential HTN, HLD, gout, former tobacco abuse, and PVD with remote resection of the right 1st and 2nd toe [facility]. He has chronic right hand pain from arthritis but he felt the pain had gotten much worse in the past 2-3 days. He describes a shooting, aching pain, particularly through his thumb, 2nd, and 3rd fingers. He has limited ROM due to arthritis but reports that this is not new. He also has limited wrist flexion and extension.   In the [facility] he was noted to have concerning findings of his left lower extremity. He has chronic woody appearance and hyperpigmentation of the skin on his LLE, but in the past couple of days the skin has been more erythematous. He also notes new edema of the LLE as well as a new blister on the dorsum of his foot. He had decreased pulses though they could be found with doppler.     Hospital Course:  Mr. Ray was admitted due to concern for LLE cellulitis. Per the [place] ER physician, she found his exam to be concerning for necrotizing fasciitis, however upon my exam I had very low clinical concern for this. He did appear to have chronic venous insufficiency and likely PVD. Vascular was consulted.  He had chronic hyperpigmentation of his LLE c/w chronic venous stasis with an overlying area of erythema and calor concerning for cellulitis. He was started on empiric antibiotics with meropenam and vancomycin (penicillin allergic). Blood cultures collected but will likely be low yield as he was not septic. He did have a  slightly elevated lactate that remained stable.     Interval history:  No acute issue. No new complaints. No fevers. Skin findings mostly unchanged.    Review of Systems   Constitutional: Negative for chills and fever.   HENT: Negative for congestion and rhinorrhea.    Eyes: Negative for photophobia and visual disturbance.   Respiratory: Positive for shortness of breath. Negative for cough.    Cardiovascular: Positive for leg swelling. Negative for chest pain.   Gastrointestinal: Negative for abdominal pain, constipation, diarrhea and nausea.   Genitourinary: Negative for difficulty urinating and dysuria.   Musculoskeletal: Positive for arthralgias. Negative for gait problem and joint swelling.   Skin: Positive for color change and wound.   Neurological: Negative for dizziness and light-headedness.   Psychiatric/Behavioral: Negative for confusion and dysphoric mood.     Objective:     Vital Signs (Most Recent):  Temp: 98.1 °F (36.7 °C) (10/11/18 1616)  Pulse: 90 (10/11/18 1616)  Resp: 17 (10/11/18 1616)  BP: 100/67 (10/11/18 1616)  SpO2: 99 % (10/11/18 1616) Vital Signs (24h Range):  Temp:  [97.4 °F (36.3 °C)-99.3 °F (37.4 °C)] 98.1 °F (36.7 °C)  Pulse:  [] 90  Resp:  [17-20] 17  SpO2:  [93 %-99 %] 99 %  BP: (100-125)/(67-86) 100/67     Weight: 96.2 kg (212 lb)  Body mass index is 29.57 kg/m².    Physical Exam   Constitutional: He is oriented to person, place, and time. He appears well-developed and well-nourished. No distress.   HENT:   Right Ear: External ear normal.   Left Ear: External ear normal.   Nose: Nose normal.   Mouth/Throat: Oropharynx is clear and moist.   Eyes: Conjunctivae and EOM are normal. Pupils are equal, round, and reactive to light. No scleral icterus.   Neck: Normal range of motion. Neck supple. No thyromegaly present.   Cardiovascular: Normal rate and normal heart sounds. Exam reveals no friction rub.   No murmur heard.  Pulmonary/Chest: Effort normal and breath sounds normal. No  "respiratory distress. He has no wheezes. He has no rales.   Abdominal: Soft. Bowel sounds are normal. He exhibits no mass. There is no tenderness.   Obese   Musculoskeletal: He exhibits deformity. He exhibits no edema.   Bilateral hands with synovitis particularly of the PIPs right much worse than left and right medial MCPs. Limited ROM in the hands and wrist bilaterally.  Right great and second toe amputated, well healed.   Neurological: He is alert and oriented to person, place, and time. No cranial nerve deficit.   Skin: Skin is warm and dry. No pallor.   Left lower shin with woody appearance, hyperpigmented with erythema and calor. Sloughing skin, wet in appearance but without exudate.   Psychiatric: He has a normal mood and affect. His behavior is normal. Thought content normal.         CRANIAL NERVES     CN III, IV, VI   Pupils are equal, round, and reactive to light.  Extraocular motions are normal.         Significant Labs: All pertinent labs within the past 24 hours have been reviewed.    Significant Imaging: I have reviewed and interpreted all pertinent imaging results/findings within the past 24 hours.    Assessment/Plan:      * Left leg cellulitis    LLE concerning for cellulitis. Blood cultures with Strep C but likely a contaminant - will discuss with micro. Wound care consulted. Treating empirically with meropenem and vanc. Monitor leukocytosis for improvement. Lactate 2.72 in the ER; repeat 3.0. He does have a markedly elevated CRP as well as an elevated ERS. Given clinical findings of cellulitis, continue empiric treatment with antibiotics for 7 days and follow up in clinic.        Right wrist pain    Acute on chronic right wrist pain. Xrays showed "significant abnormality with resorption or resection involving the majority of the lunate". Ortho consulted.        PVD (peripheral vascular disease)    Given his tobacco history, previous poor wound healing, calcifications seen on Xray, and skin " findings, PVD highly suspected. Vascular consulted, recs appreciated.         Essential hypertension    Continue home amlodipine and benazepril. PRN clonidine available.        Tophaceous gout    Continue home allopurinol. Will need follow up as outpatient. He has progressive deformities of his hands. Does not appear to have an acute flare.        Neovascular glaucoma of both eyes    Continue eye drops.        HLD (hyperlipidemia)    Continue Zetia.        DM type 2 with diabetic peripheral neuropathy    Continue basal/bolus insulin. Adjust as needed. He has been seen in endocrine clinic. Continue to follow up with them as outpatient.          VTE Risk Mitigation (From admission, onward)        Ordered     heparin (porcine) injection 5,000 Units  Every 8 hours      10/10/18 1710     IP VTE HIGH RISK PATIENT  Once      10/10/18 1710              Kirsten Trinh MD  Department of Hospital Medicine   [facility]

## 2018-10-11 NOTE — SUBJECTIVE & OBJECTIVE
Interval history:  No acute issue. No new complaints. No fevers. Skin findings mostly unchanged.    Review of Systems   Constitutional: Negative for chills and fever.   HENT: Negative for congestion and rhinorrhea.    Eyes: Negative for photophobia and visual disturbance.   Respiratory: Positive for shortness of breath. Negative for cough.    Cardiovascular: Positive for leg swelling. Negative for chest pain.   Gastrointestinal: Negative for abdominal pain, constipation, diarrhea and nausea.   Genitourinary: Negative for difficulty urinating and dysuria.   Musculoskeletal: Positive for arthralgias. Negative for gait problem and joint swelling.   Skin: Positive for color change and wound.   Neurological: Negative for dizziness and light-headedness.   Psychiatric/Behavioral: Negative for confusion and dysphoric mood.     Objective:     Vital Signs (Most Recent):  Temp: 98.1 °F (36.7 °C) (10/11/18 1616)  Pulse: 90 (10/11/18 1616)  Resp: 17 (10/11/18 1616)  BP: 100/67 (10/11/18 1616)  SpO2: 99 % (10/11/18 1616) Vital Signs (24h Range):  Temp:  [97.4 °F (36.3 °C)-99.3 °F (37.4 °C)] 98.1 °F (36.7 °C)  Pulse:  [] 90  Resp:  [17-20] 17  SpO2:  [93 %-99 %] 99 %  BP: (100-125)/(67-86) 100/67     Weight: 96.2 kg (212 lb)  Body mass index is 29.57 kg/m².    Physical Exam   Constitutional: He is oriented to person, place, and time. He appears well-developed and well-nourished. No distress.   HENT:   Right Ear: External ear normal.   Left Ear: External ear normal.   Nose: Nose normal.   Mouth/Throat: Oropharynx is clear and moist.   Eyes: Conjunctivae and EOM are normal. Pupils are equal, round, and reactive to light. No scleral icterus.   Neck: Normal range of motion. Neck supple. No thyromegaly present.   Cardiovascular: Normal rate and normal heart sounds. Exam reveals no friction rub.   No murmur heard.  Pulmonary/Chest: Effort normal and breath sounds normal. No respiratory distress. He has no wheezes. He has no rales.    Abdominal: Soft. Bowel sounds are normal. He exhibits no mass. There is no tenderness.   Obese   Musculoskeletal: He exhibits deformity. He exhibits no edema.   Bilateral hands with synovitis particularly of the PIPs right much worse than left and right medial MCPs. Limited ROM in the hands and wrist bilaterally.  Right great and second toe amputated, well healed.   Neurological: He is alert and oriented to person, place, and time. No cranial nerve deficit.   Skin: Skin is warm and dry. No pallor.   Left lower shin with woody appearance, hyperpigmented with erythema and calor. Sloughing skin, wet in appearance but without exudate.   Psychiatric: He has a normal mood and affect. His behavior is normal. Thought content normal.         CRANIAL NERVES     CN III, IV, VI   Pupils are equal, round, and reactive to light.  Extraocular motions are normal.         Significant Labs: All pertinent labs within the past 24 hours have been reviewed.    Significant Imaging: I have reviewed and interpreted all pertinent imaging results/findings within the past 24 hours.

## 2018-10-11 NOTE — ASSESSMENT & PLAN NOTE
"Acute on chronic right wrist pain. Xrays showed "significant abnormality with resorption or resection involving the majority of the lunate". Ortho consulted.  "

## 2018-10-12 PROBLEM — N18.30 STAGE 3 CHRONIC KIDNEY DISEASE: Chronic | Status: ACTIVE | Noted: 2018-10-12

## 2018-10-12 PROBLEM — R16.2 HEPATOSPLENOMEGALY: Status: ACTIVE | Noted: 2018-10-12

## 2018-10-12 LAB
ALBUMIN SERPL BCP-MCNC: 2.7 G/DL
ALP SERPL-CCNC: 151 U/L
ALT SERPL W/O P-5'-P-CCNC: 75 U/L
ANA SER QL IF: NORMAL
ANION GAP SERPL CALC-SCNC: 10 MMOL/L
AST SERPL-CCNC: 93 U/L
BASOPHILS # BLD AUTO: 0.02 K/UL
BASOPHILS NFR BLD: 0.2 %
BILIRUB SERPL-MCNC: 2.2 MG/DL
BUN SERPL-MCNC: 67 MG/DL
CALCIUM SERPL-MCNC: 9.1 MG/DL
CHLORIDE SERPL-SCNC: 102 MMOL/L
CO2 SERPL-SCNC: 20 MMOL/L
CREAT SERPL-MCNC: 1.6 MG/DL
DIFFERENTIAL METHOD: ABNORMAL
EOSINOPHIL # BLD AUTO: 0 K/UL
EOSINOPHIL NFR BLD: 0.1 %
ERYTHROCYTE [DISTWIDTH] IN BLOOD BY AUTOMATED COUNT: 16.8 %
EST. GFR  (AFRICAN AMERICAN): 53 ML/MIN/1.73 M^2
EST. GFR  (NON AFRICAN AMERICAN): 46 ML/MIN/1.73 M^2
GLUCOSE SERPL-MCNC: 104 MG/DL
HCT VFR BLD AUTO: 38.9 %
HGB BLD-MCNC: 13.3 G/DL
LEFT ARM BP: 112 MMHG
LEFT TBI: 0.15
LEFT TOE PRESSURE: 17 MMHG
LYMPHOCYTES # BLD AUTO: 0.9 K/UL
LYMPHOCYTES NFR BLD: 7.1 %
MCH RBC QN AUTO: 31.1 PG
MCHC RBC AUTO-ENTMCNC: 34.2 G/DL
MCV RBC AUTO: 91 FL
MONOCYTES # BLD AUTO: 1.1 K/UL
MONOCYTES NFR BLD: 9.2 %
NEUTROPHILS # BLD AUTO: 10.2 K/UL
NEUTROPHILS NFR BLD: 83.4 %
PLATELET # BLD AUTO: 179 K/UL
PMV BLD AUTO: 11 FL
POCT GLUCOSE: 104 MG/DL (ref 70–110)
POCT GLUCOSE: 105 MG/DL (ref 70–110)
POCT GLUCOSE: 122 MG/DL (ref 70–110)
POCT GLUCOSE: 127 MG/DL (ref 70–110)
POTASSIUM SERPL-SCNC: 4.8 MMOL/L
PROT SERPL-MCNC: 6.3 G/DL
RBC # BLD AUTO: 4.28 M/UL
RIGHT ARM BP: 111 MMHG
RIGHT TBI: 0
RIGHT TOE PRESSURE: 0 MMHG
SODIUM SERPL-SCNC: 132 MMOL/L
WBC # BLD AUTO: 12.24 K/UL

## 2018-10-12 PROCEDURE — 80053 COMPREHEN METABOLIC PANEL: CPT

## 2018-10-12 PROCEDURE — 25000003 PHARM REV CODE 250: Performed by: INTERNAL MEDICINE

## 2018-10-12 PROCEDURE — 63600175 PHARM REV CODE 636 W HCPCS: Performed by: INTERNAL MEDICINE

## 2018-10-12 PROCEDURE — 36415 COLL VENOUS BLD VENIPUNCTURE: CPT

## 2018-10-12 PROCEDURE — 25000003 PHARM REV CODE 250: Performed by: EMERGENCY MEDICINE

## 2018-10-12 PROCEDURE — 11000001 HC ACUTE MED/SURG PRIVATE ROOM

## 2018-10-12 PROCEDURE — 63600175 PHARM REV CODE 636 W HCPCS: Performed by: EMERGENCY MEDICINE

## 2018-10-12 PROCEDURE — 85025 COMPLETE CBC W/AUTO DIFF WBC: CPT

## 2018-10-12 RX ORDER — SODIUM CHLORIDE 9 MG/ML
INJECTION, SOLUTION INTRAVENOUS CONTINUOUS
Status: ACTIVE | OUTPATIENT
Start: 2018-10-12 | End: 2018-10-12

## 2018-10-12 RX ADMIN — DORZOLAMIDE HYDROCHLORIDE AND TIMOLOL MALEATE 1 DROP: 20; 5 SOLUTION/ DROPS OPHTHALMIC at 09:10

## 2018-10-12 RX ADMIN — DORZOLAMIDE HYDROCHLORIDE AND TIMOLOL MALEATE 1 DROP: 20; 5 SOLUTION/ DROPS OPHTHALMIC at 08:10

## 2018-10-12 RX ADMIN — KETOROLAC TROMETHAMINE 1 DROP: 5 SOLUTION/ DROPS OPHTHALMIC at 10:10

## 2018-10-12 RX ADMIN — ASPIRIN 81 MG 81 MG: 81 TABLET ORAL at 08:10

## 2018-10-12 RX ADMIN — BRIMONIDINE TARTRATE 1 DROP: 1 SOLUTION/ DROPS OPHTHALMIC at 10:10

## 2018-10-12 RX ADMIN — HEPARIN SODIUM 5000 UNITS: 5000 INJECTION, SOLUTION INTRAVENOUS; SUBCUTANEOUS at 05:10

## 2018-10-12 RX ADMIN — PREDNISOLONE ACETATE 1 DROP: 10 SUSPENSION/ DROPS OPHTHALMIC at 08:10

## 2018-10-12 RX ADMIN — PREDNISOLONE ACETATE 1 DROP: 10 SUSPENSION/ DROPS OPHTHALMIC at 02:10

## 2018-10-12 RX ADMIN — AMLODIPINE BESYLATE 5 MG: 5 TABLET ORAL at 08:10

## 2018-10-12 RX ADMIN — ALLOPURINOL 200 MG: 100 TABLET ORAL at 08:10

## 2018-10-12 RX ADMIN — BRIMONIDINE TARTRATE 1 DROP: 1 SOLUTION/ DROPS OPHTHALMIC at 02:10

## 2018-10-12 RX ADMIN — MEROPENEM AND SODIUM CHLORIDE 1 G: 1 INJECTION, SOLUTION INTRAVENOUS at 05:10

## 2018-10-12 RX ADMIN — KETOROLAC TROMETHAMINE 1 DROP: 5 SOLUTION/ DROPS OPHTHALMIC at 02:10

## 2018-10-12 RX ADMIN — KETOROLAC TROMETHAMINE 1 DROP: 5 SOLUTION/ DROPS OPHTHALMIC at 08:10

## 2018-10-12 RX ADMIN — HEPARIN SODIUM 5000 UNITS: 5000 INJECTION, SOLUTION INTRAVENOUS; SUBCUTANEOUS at 09:10

## 2018-10-12 RX ADMIN — DORZOLAMIDE HYDROCHLORIDE AND TIMOLOL MALEATE 1 DROP: 20; 5 SOLUTION/ DROPS OPHTHALMIC at 02:10

## 2018-10-12 RX ADMIN — HEPARIN SODIUM 5000 UNITS: 5000 INJECTION, SOLUTION INTRAVENOUS; SUBCUTANEOUS at 01:10

## 2018-10-12 RX ADMIN — SODIUM CHLORIDE: 0.9 INJECTION, SOLUTION INTRAVENOUS at 07:10

## 2018-10-12 RX ADMIN — INSULIN DETEMIR 20 UNITS: 100 INJECTION, SOLUTION SUBCUTANEOUS at 09:10

## 2018-10-12 RX ADMIN — PREDNISOLONE ACETATE 1 DROP: 10 SUSPENSION/ DROPS OPHTHALMIC at 10:10

## 2018-10-12 RX ADMIN — EZETIMIBE 10 MG: 10 TABLET ORAL at 08:10

## 2018-10-12 RX ADMIN — SODIUM CHLORIDE: 0.9 INJECTION, SOLUTION INTRAVENOUS at 12:10

## 2018-10-12 RX ADMIN — MULTIPLE VITAMINS W/ MINERALS TAB 1 TABLET: TAB at 08:10

## 2018-10-12 RX ADMIN — BENAZEPRIL HYDROCHLORIDE 40 MG: 40 TABLET, COATED ORAL at 08:10

## 2018-10-12 RX ADMIN — BRIMONIDINE TARTRATE 1 DROP: 1 SOLUTION/ DROPS OPHTHALMIC at 08:10

## 2018-10-12 RX ADMIN — VANCOMYCIN HYDROCHLORIDE 1500 MG: 1 INJECTION, POWDER, LYOPHILIZED, FOR SOLUTION INTRAVENOUS at 12:10

## 2018-10-12 NOTE — CONSULTS
C.C. Right wrist pain  Left leg cellulitis    HPI: Marvin Dao58 y.o. complaining of right wrist and left leg pain. Pt states he has had pain and problems with the right wrist in the past but it has been quite some time. He has hx of swelling and pain to the right dorsal wrist which he associates with beginning with the left leg cellulitis. He is receiving abx for the cellulitis.    ROS:   Pertinent positives: right wrist pain. Left leg pain   Negatives:  CP, SOB,    All other 14 point ROS negative    PMH:   Past Medical History:   Diagnosis Date    Arthritis     Diabetes mellitus     Diabetic retinopathy     Glaucoma     Gout     Hyperlipemia     Hypertension        PSH:   Past Surgical History:   Procedure Laterality Date    AHMED GLAUCOMA IMPLANT Left 2011    DONE AT [facility]    BAERVELDT GLAUCOMA IMPLANT Left 2012    WITH CATARACT EXTRACTION//DONE AT [facility]    CATARACT EXTRACTION W/  INTRAOCULAR LENS IMPLANT Left 2012    WITH BAERVEDT//DONE AT [facility]    CATARACT EXTRACTION W/  INTRAOCULAR LENS IMPLANT Right 09/26/2018    COMPLEX ()    CB DESTRUCTION WITH CYCLO G6 Left 02/15/2017        CYST REMOVAL      HEART CATH-LEFT N/A 5/6/2016    Performed by Mike Magana MD at [facility] CATH LAB    INSERTION, IOL PROSTHESIS Right 9/26/2018    Performed by Perla Cortés MD at [facility] OR [address] [place] [address]    INTRAOCULAR PROSTHESES INSERTION Right 9/26/2018    Procedure: INSERTION, IOL PROSTHESIS;  Surgeon: Perla Cortés MD;  Location: [facility] OR [address] [place] [address];  Service: Ophthalmology;  Laterality: Right;    PHACOEMULSIFICATION OF CATARACT Right 9/26/2018    Procedure: PHACOEMULSIFICATION, CATARACT;  Surgeon: Perla Cortés MD;  Location: [facility] OR [address] [place] [address];  Service: Ophthalmology;  Laterality: Right;    PHACOEMULSIFICATION, CATARACT Right 9/26/2018    Performed by Perla Cortés MD at [facility] OR [address] [place] [address]    Right foot surgery  10/2014    TOE AMPUTATION      TONSILLECTOMY       TRABECULECTOMY/G 6 LASER Left 2/15/2017    Performed by [facility] Cortés MD at [facility] OR [address] [place] [address]       Social Hx:   Social History     Occupational History    Not on file   Tobacco Use    Smoking status: Former Smoker     Packs/day: 1.00     Years: 3.00     Pack years: 3.00     Last attempt to quit: 1984     Years since quittin.2    Smokeless tobacco: Never Used   Substance and Sexual Activity    Alcohol use: Yes     Alcohol/week: 0.6 oz     Types: 1 Cans of beer per week     Comment: Occasional    Drug use: No    Sexual activity: Not Currently       Medications:    No current facility-administered medications on file prior to encounter.      Current Outpatient Medications on File Prior to Encounter   Medication Sig Dispense Refill    allopurinol (ZYLOPRIM) 100 MG tablet Take 2 tablets (200 mg total) by mouth once daily. 180 tablet 3    amLODIPine (NORVASC) 5 MG tablet TAKE 1 TABLET(5 MG) BY MOUTH EVERY DAY 30 tablet 0    aspirin 81 MG Chew Take 81 mg by mouth once daily.      benazepril (LOTENSIN) 40 MG tablet TAKE 1 TABLET(40 MG) BY MOUTH TWICE DAILY 60 tablet 0    brimonidine 0.1% (ALPHAGAN P) 0.1 % Drop Place 1 drop into both eyes 3 (three) times daily. 15 mL 12    coenzyme Q10 100 mg capsule Take 100 mg by mouth every morning.      dorzolamide-timolol 2-0.5% (COSOPT) 22.3-6.8 mg/mL ophthalmic solution Place 1 drop into both eyes 3 (three) times daily. 10 mL 12    ezetimibe (ZETIA) 10 mg tablet Take 1 tablet (10 mg total) by mouth once daily. 30 tablet 2    fish oil-omega-3 fatty acids 300-1,000 mg capsule Take 2 capsules (2 g total) by mouth 2 (two) times daily. (Patient taking differently: Take 1 g by mouth 2 (two) times daily. ) 120 capsule 5    insulin aspart U-100 (NOVOLOG) 100 unit/mL InPn pen Inject 12 Units into the skin 3 (three) times daily with meals. 2 Box 11    insulin glargine-lixisenatide (SOLIQUA 100/33) 100 unit-33 mcg/mL InPn Inject 34 units once daily 5 Syringe  "2    ketorolac 0.5% (ACULAR) 0.5 % Drop Place 1 drop into the right eye 3 (three) times daily. 1 Bottle 2    MULTIVIT,THER IRON,CA,FA & MIN (MULTIVITAMIN) Tab Take 1 tablet by mouth every morning.       nitroGLYCERIN (NITROSTAT) 0.4 MG SL tablet Place 1 tablet (0.4 mg total) under the tongue every 5 (five) minutes as needed for Chest pain (If CP persists go to ER. If taking NTG notify MD.). 20 tablet 1    pen needle, diabetic 31 gauge x 1/4" Ndle Use as directed 100 each 11    prednisoLONE acetate (PRED FORTE) 1 % DrpS Place 1 drop into the right eye 3 (three) times daily. 1 Bottle 2         PE:         Vitals:    10/12/18 1131   BP: 120/85   Pulse: 90   Resp: 18   Temp: 98.2 °F (36.8 °C)       Estimated body mass index is 29.57 kg/m² as calculated from the following:    Height as of this encounter: 5' 11" (1.803 m).    Weight as of this encounter: 96.2 kg (212 lb).     General WDWN, NAD     Extremity: right hand mild swelling over dorsal wrist at radiocarpal joint  No cellulitis  No erythema  Mild pain with palpation   strength intact    Left leg with open draining areas of cellulitis and erythema    Labs:    Lab Results   Component Value Date    WBC 12.24 10/12/2018    HGB 13.3 (L) 10/12/2018    HCT 38.9 (L) 10/12/2018    MCV 91 10/12/2018     10/12/2018       BMP  Lab Results   Component Value Date     (L) 10/12/2018    K 4.8 10/12/2018     10/12/2018    CO2 20 (L) 10/12/2018    BUN 67 (H) 10/12/2018    CREATININE 1.6 (H) 10/12/2018    CALCIUM 9.1 10/12/2018    ANIONGAP 10 10/12/2018    ESTGFRAFRICA 53 (A) 10/12/2018    EGFRNONAA 46 (A) 10/12/2018       Lab Results   Component Value Date    INR 1.0 05/05/2016       Lab Results   Component Value Date    SEDRATE 39 (H) 10/10/2018       Lab Results   Component Value Date    .7 (H) 10/10/2018       Radiography:  Film    Interpretation  Xray of the right wrist shows appearance of AVN of the lunate with mild arthritic change  xrays of " LLE shows erosive changes consistent with gout    A/P  58 y.o.male with right wrist AVN and mild DJD likely from a  Previous trauma    Left leg cellulitis with erythema    Continue Abx for left leg.  Conservative tx with NSAID for right wrist  Ice and PT can help as well.  Will follow      Tyrese Gray MD

## 2018-10-12 NOTE — PLAN OF CARE
Attempted to see patient on rounds although he was in CT to evaluate gallbladder concerns by primary.  BLE arterial US, pressures and waveforms reviewed.  Perfusion appears appropriate to heal LLE cellulitis.  Agree with Abx.  Recommend light compression, elevation, dietary changes associated with water and sodium to improve venous hypertension.  Strict glycemic control.  No vascular surgical intervention indicated at this time.  I will re-evaluate pt 10/15/18 if he remains inpatient.  I will arrange f/u outpatient once pt is recovered from current clinical condition and continue to follow patient's PVD.  Please reconsult vascular surgery if further evaluation is needed.     If urgent vascular surgery evaluation is needed over the weekend, contact the Vascular Surgery Department at [facility] at [phone] and ask to page the Vascular Surgeon on Call.

## 2018-10-12 NOTE — PT/OT/SLP PROGRESS
Occupational Therapy      Patient Name:  Marvin Ray   MRN:  [id number]    Patient not seen today secondary to Other (Comment)(awaiting ortho MD clearance secondary to R wrist pain and abnormal xray ). Will follow-up tomorrow pending ortho clearance.    ROSALVA Lauren, MS  10/12/2018

## 2018-10-12 NOTE — PLAN OF CARE
10/12/18 0943   Discharge Reassessment   Assessment Type Discharge Planning Reassessment   Do you have any problems affording any of your prescribed medications? No   Discharge Plan A Home with family   Discharge Plan B (Appts Scheduled. Cultures pending. Will need Wound Care outpt. )   Patient choice form signed by patient/caregiver N/A   Can the patient answer the patient profile reliably? Yes, cognitively intact   How does the patient rate their overall health at the present time? Poor   Describe the patient's ability to walk at the present time. Minor restrictions or changes   How often would a person be available to care for the patient? Whenever needed   Number of comorbid conditions (as recorded on the chart) Five or more

## 2018-10-12 NOTE — PT/OT/SLP PROGRESS
Physical Therapy      Patient Name:  Marvin Ray   MRN:  [id number]    Patient not seen today for PT eval secondary to Toileting (pt was in the bathroom at 1154) and Unavailable (pt with MD at 1217). Will follow-up tomorrow.    Mervat Seo, PT

## 2018-10-12 NOTE — PROGRESS NOTES
Dressing on left lower leg saturated with franco fluid. Will increase frequency of dressing changes to twice a day with hydrofiber.

## 2018-10-12 NOTE — PROGRESS NOTES
Appointment Scheduled.    Follow-up Information     Anastacia Don III, MD On 10/17/2018.    Specialty:  Orthopedic Surgery  Why:  Wednesday at 1pm. Bone and Joint appointment scheduled.   Contact information:  [address] [facility]  [place] [address]  [phone]             Rubén Davis MD On 10/18/2018.    Specialty:  Family Medicine  Why:  Thursday at 11am. Primary Care Appointment. [facility].   Contact information:  [address] [facility]  [place] [address]  [phone]

## 2018-10-13 LAB
ALBUMIN SERPL BCP-MCNC: 2.6 G/DL
ALP SERPL-CCNC: 197 U/L
ALT SERPL W/O P-5'-P-CCNC: 95 U/L
ANION GAP SERPL CALC-SCNC: 11 MMOL/L
AST SERPL-CCNC: 94 U/L
BACTERIA BLD CULT: NORMAL
BASOPHILS # BLD AUTO: 0.03 K/UL
BASOPHILS NFR BLD: 0.2 %
BILIRUB SERPL-MCNC: 2.4 MG/DL
BUN SERPL-MCNC: 60 MG/DL
CALCIUM SERPL-MCNC: 9.1 MG/DL
CHLORIDE SERPL-SCNC: 103 MMOL/L
CO2 SERPL-SCNC: 20 MMOL/L
CREAT SERPL-MCNC: 1.1 MG/DL
DIFFERENTIAL METHOD: ABNORMAL
EOSINOPHIL # BLD AUTO: 0 K/UL
EOSINOPHIL NFR BLD: 0.2 %
ERYTHROCYTE [DISTWIDTH] IN BLOOD BY AUTOMATED COUNT: 16.8 %
EST. GFR  (AFRICAN AMERICAN): >60 ML/MIN/1.73 M^2
EST. GFR  (NON AFRICAN AMERICAN): >60 ML/MIN/1.73 M^2
GLUCOSE SERPL-MCNC: 87 MG/DL
HCT VFR BLD AUTO: 39.1 %
HGB BLD-MCNC: 13.7 G/DL
IRON SERPL-MCNC: 33 UG/DL
LYMPHOCYTES # BLD AUTO: 1.2 K/UL
LYMPHOCYTES NFR BLD: 9.5 %
MCH RBC QN AUTO: 31.5 PG
MCHC RBC AUTO-ENTMCNC: 35 G/DL
MCV RBC AUTO: 90 FL
MONOCYTES # BLD AUTO: 0.9 K/UL
MONOCYTES NFR BLD: 7.3 %
NEUTROPHILS # BLD AUTO: 10.7 K/UL
NEUTROPHILS NFR BLD: 82.8 %
PLATELET # BLD AUTO: 194 K/UL
PMV BLD AUTO: 11 FL
POCT GLUCOSE: 129 MG/DL (ref 70–110)
POCT GLUCOSE: 148 MG/DL (ref 70–110)
POCT GLUCOSE: 202 MG/DL (ref 70–110)
POCT GLUCOSE: 99 MG/DL (ref 70–110)
POTASSIUM SERPL-SCNC: 4.4 MMOL/L
PROT SERPL-MCNC: 6.4 G/DL
RBC # BLD AUTO: 4.35 M/UL
SATURATED IRON: 12 %
SODIUM SERPL-SCNC: 134 MMOL/L
TOTAL IRON BINDING CAPACITY: 271 UG/DL
TRANSFERRIN SERPL-MCNC: 183 MG/DL
VANCOMYCIN TROUGH SERPL-MCNC: 12.1 UG/ML
WBC # BLD AUTO: 12.88 K/UL

## 2018-10-13 PROCEDURE — G8988 SELF CARE GOAL STATUS: HCPCS | Mod: CI | Performed by: SPECIALIST

## 2018-10-13 PROCEDURE — 25000003 PHARM REV CODE 250: Performed by: INTERNAL MEDICINE

## 2018-10-13 PROCEDURE — 85025 COMPLETE CBC W/AUTO DIFF WBC: CPT

## 2018-10-13 PROCEDURE — G8987 SELF CARE CURRENT STATUS: HCPCS | Mod: CJ | Performed by: SPECIALIST

## 2018-10-13 PROCEDURE — 63600175 PHARM REV CODE 636 W HCPCS: Performed by: EMERGENCY MEDICINE

## 2018-10-13 PROCEDURE — 97161 PT EVAL LOW COMPLEX 20 MIN: CPT

## 2018-10-13 PROCEDURE — 83540 ASSAY OF IRON: CPT

## 2018-10-13 PROCEDURE — 80202 ASSAY OF VANCOMYCIN: CPT

## 2018-10-13 PROCEDURE — 11000001 HC ACUTE MED/SURG PRIVATE ROOM

## 2018-10-13 PROCEDURE — G8978 MOBILITY CURRENT STATUS: HCPCS | Mod: CJ

## 2018-10-13 PROCEDURE — G8979 MOBILITY GOAL STATUS: HCPCS | Mod: CI

## 2018-10-13 PROCEDURE — 86803 HEPATITIS C AB TEST: CPT

## 2018-10-13 PROCEDURE — 97116 GAIT TRAINING THERAPY: CPT

## 2018-10-13 PROCEDURE — 63600175 PHARM REV CODE 636 W HCPCS: Performed by: INTERNAL MEDICINE

## 2018-10-13 PROCEDURE — 36415 COLL VENOUS BLD VENIPUNCTURE: CPT

## 2018-10-13 PROCEDURE — 80053 COMPREHEN METABOLIC PANEL: CPT

## 2018-10-13 PROCEDURE — 87340 HEPATITIS B SURFACE AG IA: CPT

## 2018-10-13 RX ADMIN — BENAZEPRIL HYDROCHLORIDE 40 MG: 40 TABLET, COATED ORAL at 08:10

## 2018-10-13 RX ADMIN — HEPARIN SODIUM 5000 UNITS: 5000 INJECTION, SOLUTION INTRAVENOUS; SUBCUTANEOUS at 05:10

## 2018-10-13 RX ADMIN — PREDNISOLONE ACETATE 1 DROP: 10 SUSPENSION/ DROPS OPHTHALMIC at 09:10

## 2018-10-13 RX ADMIN — BRIMONIDINE TARTRATE 1 DROP: 1 SOLUTION/ DROPS OPHTHALMIC at 08:10

## 2018-10-13 RX ADMIN — PREDNISOLONE ACETATE 1 DROP: 10 SUSPENSION/ DROPS OPHTHALMIC at 02:10

## 2018-10-13 RX ADMIN — KETOROLAC TROMETHAMINE 1 DROP: 5 SOLUTION/ DROPS OPHTHALMIC at 09:10

## 2018-10-13 RX ADMIN — BRIMONIDINE TARTRATE 1 DROP: 1 SOLUTION/ DROPS OPHTHALMIC at 02:10

## 2018-10-13 RX ADMIN — DORZOLAMIDE HYDROCHLORIDE AND TIMOLOL MALEATE 1 DROP: 20; 5 SOLUTION/ DROPS OPHTHALMIC at 09:10

## 2018-10-13 RX ADMIN — DORZOLAMIDE HYDROCHLORIDE AND TIMOLOL MALEATE 1 DROP: 20; 5 SOLUTION/ DROPS OPHTHALMIC at 02:10

## 2018-10-13 RX ADMIN — HEPARIN SODIUM 5000 UNITS: 5000 INJECTION, SOLUTION INTRAVENOUS; SUBCUTANEOUS at 02:10

## 2018-10-13 RX ADMIN — INSULIN ASPART 2 UNITS: 100 INJECTION, SOLUTION INTRAVENOUS; SUBCUTANEOUS at 09:10

## 2018-10-13 RX ADMIN — HEPARIN SODIUM 5000 UNITS: 5000 INJECTION, SOLUTION INTRAVENOUS; SUBCUTANEOUS at 09:10

## 2018-10-13 RX ADMIN — DORZOLAMIDE HYDROCHLORIDE AND TIMOLOL MALEATE 1 DROP: 20; 5 SOLUTION/ DROPS OPHTHALMIC at 08:10

## 2018-10-13 RX ADMIN — BRIMONIDINE TARTRATE 1 DROP: 1 SOLUTION/ DROPS OPHTHALMIC at 09:10

## 2018-10-13 RX ADMIN — ASPIRIN 81 MG 81 MG: 81 TABLET ORAL at 08:10

## 2018-10-13 RX ADMIN — PREDNISOLONE ACETATE 1 DROP: 10 SUSPENSION/ DROPS OPHTHALMIC at 08:10

## 2018-10-13 RX ADMIN — KETOROLAC TROMETHAMINE 1 DROP: 5 SOLUTION/ DROPS OPHTHALMIC at 02:10

## 2018-10-13 RX ADMIN — MULTIPLE VITAMINS W/ MINERALS TAB 1 TABLET: TAB at 08:10

## 2018-10-13 RX ADMIN — EZETIMIBE 10 MG: 10 TABLET ORAL at 08:10

## 2018-10-13 RX ADMIN — INSULIN DETEMIR 20 UNITS: 100 INJECTION, SOLUTION SUBCUTANEOUS at 09:10

## 2018-10-13 RX ADMIN — ALLOPURINOL 200 MG: 100 TABLET ORAL at 08:10

## 2018-10-13 RX ADMIN — CEFTRIAXONE 2 G: 2 INJECTION, SOLUTION INTRAVENOUS at 05:10

## 2018-10-13 RX ADMIN — AMLODIPINE BESYLATE 5 MG: 5 TABLET ORAL at 08:10

## 2018-10-13 RX ADMIN — MEROPENEM AND SODIUM CHLORIDE 1 G: 1 INJECTION, SOLUTION INTRAVENOUS at 05:10

## 2018-10-13 RX ADMIN — KETOROLAC TROMETHAMINE 1 DROP: 5 SOLUTION/ DROPS OPHTHALMIC at 08:10

## 2018-10-13 NOTE — PLAN OF CARE
Problem: Occupational Therapy Goal  Goal: Occupational Therapy Goal  Goals to be met by: 10/27/18     Patient will increase functional independence with ADLs by performing:    UE Dressing with Modified [place].  LE Dressing with Set-up Assistance.  Grooming while standing at sink with Contact Guard Assistance.  Toileting from toilet with Stand-by Assistance for hygiene and clothing management.   Pt to participate in L hand FMC tasks to increase use for ADLs.      Outcome: Ongoing (interventions implemented as appropriate)  OT evaluation complete.  Pt will benefit from  OT.

## 2018-10-13 NOTE — SUBJECTIVE & OBJECTIVE
Interval history:  Blister on the bottom of his left foot is much more extensive now extending around to the top of his foot.    Review of Systems   Constitutional: Negative for chills and fever.   HENT: Negative for congestion and rhinorrhea.    Eyes: Negative for photophobia and visual disturbance.   Respiratory: Positive for shortness of breath. Negative for cough.    Cardiovascular: Positive for leg swelling. Negative for chest pain.   Gastrointestinal: Negative for abdominal pain, constipation, diarrhea and nausea.   Genitourinary: Negative for difficulty urinating and dysuria.   Musculoskeletal: Positive for arthralgias. Negative for gait problem and joint swelling.   Skin: Positive for color change and wound.   Neurological: Negative for dizziness and light-headedness.   Psychiatric/Behavioral: Negative for confusion and dysphoric mood.     Objective:     Vital Signs (Most Recent):  Temp: 96.3 °F (35.7 °C) (10/12/18 2018)  Pulse: 97 (10/12/18 2018)  Resp: 18 (10/12/18 2018)  BP: (!) 145/89 (10/12/18 2018)  SpO2: 95 % (10/12/18 2018) Vital Signs (24h Range):  Temp:  [96.3 °F (35.7 °C)-98.4 °F (36.9 °C)] 96.3 °F (35.7 °C)  Pulse:  [83-97] 97  Resp:  [18-20] 18  SpO2:  [95 %-97 %] 95 %  BP: (104-145)/(62-89) 145/89     Weight: 96.2 kg (212 lb)  Body mass index is 29.57 kg/m².    Physical Exam   Constitutional: He is oriented to person, place, and time. He appears well-developed and well-nourished. No distress.   HENT:   Right Ear: External ear normal.   Left Ear: External ear normal.   Nose: Nose normal.   Mouth/Throat: Oropharynx is clear and moist.   Eyes: Conjunctivae and EOM are normal. Pupils are equal, round, and reactive to light. No scleral icterus.   Neck: Normal range of motion. Neck supple. No thyromegaly present.   Cardiovascular: Normal rate and normal heart sounds. Exam reveals no friction rub.   No murmur heard.  Pulmonary/Chest: Effort normal and breath sounds normal. No respiratory distress. He  has no wheezes. He has no rales.   Abdominal: Soft. Bowel sounds are normal. He exhibits no mass. There is no tenderness.   Obese   Musculoskeletal: He exhibits deformity. He exhibits no edema.   Bilateral hands with synovitis particularly of the PIPs right much worse than left and right medial MCPs. Limited ROM in the hands and wrist bilaterally.  Right great and second toe amputated, well healed.   Neurological: He is alert and oriented to person, place, and time. No cranial nerve deficit.   Skin: Skin is warm and dry. No pallor.   Left lower shin with woody appearance, hyperpigmented - less erythema than previously and no longer warm. Sloughing skin, now moist with wound care ointment.    Psychiatric: He has a normal mood and affect. His behavior is normal. Thought content normal.         CRANIAL NERVES     CN III, IV, VI   Pupils are equal, round, and reactive to light.  Extraocular motions are normal.         Significant Labs: All pertinent labs within the past 24 hours have been reviewed.    Significant Imaging: I have reviewed and interpreted all pertinent imaging results/findings within the past 24 hours.

## 2018-10-13 NOTE — PT/OT/SLP EVAL
Physical Therapy Evaluation    Patient Name:  Marvin Ray   MRN:  [id number]    Recommendations:     Discharge Recommendations:  home health PT   Discharge Equipment Recommendations:     Barriers to discharge: None    Assessment:     Marvin Ray is a 58 y.o. male admitted with a medical diagnosis of Left leg cellulitis.  He presents with the following impairments/functional limitations:  weakness, impaired functional mobilty, impaired coordination, impaired cardiopulmonary response to activity, impaired endurance, gait instability, decreased coordination, pain, impaired sensation, decreased upper extremity function, impaired skin, decreased lower extremity function.    Rehab Prognosis:  good; patient would benefit from acute skilled PT services to address these deficits and reach maximum level of function.      Recent Surgery: * No surgery found *      Plan:   During this hospitalization, patient to be seen 6 x/week to address the above listed problems via gait training, therapeutic activities, therapeutic exercises, neuromuscular re-education  · Plan of Care Expires:  10/27/18   Plan of Care Reviewed with: patient    Subjective   Communicated with JILLIAN Jo prior to session.  Patient found sitting at edge of bed upon PT entry to room, agreeable to evaluation.      Chief Complaint: pain to (B) feet  Patient comments/goals: walk better, and improve endurance  Pain/Comfort:  · Pain Rating 1: 10/10  · Location - Side 1: Bilateral  · Location - Orientation 1: (bottom/ sole)  · Location 1: foot  · Pain Addressed 1: Reposition, Distraction, Cessation of Activity  · Pain Rating Post-Intervention 1: (did not complain of any)    Living Environment:  Lives with sister and brother-in-law in a 1 peggy house with a ramp.  Prior to admission, patients level of function was mod (I0 with a cane.  Patient has the following equipment: cane, straight, cane, quad, wheelchair, other (see comments)(ramp).  DME owned (not  currently used): none.  Upon discharge, patient will have assistance from family.    Objective:   General Precautions: Standard, fall, vision impaired, diabetic   Orthopedic Precautions:    Braces: N/A     Exams:  · Cognitive Exam:  Patient is oriented to Person, Place, Time and Situation  · Gross Motor Coordination:  WFL  · Postural Exam:  Patient presented with the following abnormalities:    · -       Rounded shoulders  · -       Kyphosis  · RLE ROM: WFL  · RLE Strength: graded 3-/5 to ankles; 3+/5 to knees and hips  · LLE ROM: WFL  · LLE Strength: graded 3-/5 to ankles; 3+/5 to knees and hips    Functional Mobility:  · Bed Mobility:     · Scooting: contact guard assistance  · Transfers:     · Sit to Stand:  contact guard assistance with rolling walker  · Bed to Chair: contact guard assistance with  no AD  using  Stand Pivot  · Gait: 8 ft with HHA x 1, min A, slow-paced; antalgic to (L) LE > (R)LE, min unsteady, swaying laterally  · Balance: SITTING: Static: good; dynamic: fair; STANDING: static: fair; dynamic: fair (-)    AM-PAC 6 CLICK MOBILITY  Total Score:20     Therapeutic Activities and Exercises:   functional mob training as above; safety educ; mobility techniques    Patient left supine with all lines intact, call button in reach and RN notified.    GOALS:   Multidisciplinary Problems     Physical Therapy Goals        Problem: Physical Therapy Goal    Goal Priority Disciplines Outcome Goal Variances Interventions   Physical Therapy Goal     PT, PT/OT Ongoing (interventions implemented as appropriate)     Description:  Goals to be met by: 10/27/18     Patient will increase functional independence with mobility by performin. Supine to sit with Modified [place]  2. Sit to stand transfer with Modified [place]  3. Gait  x 150 feet with Contact Guard Assistance using the least restrictive device from platform walker -> cane.   4. Lower extremity exercise program x 20-30 reps per handout, with  supervision                    History:     Past Medical History:   Diagnosis Date    Arthritis     Diabetes mellitus     Diabetic retinopathy     Glaucoma     Gout     Hyperlipemia     Hypertension      Past Surgical History:   Procedure Laterality Date    AHMED GLAUCOMA IMPLANT Left 2011    DONE AT [facility]    BAERVELDT GLAUCOMA IMPLANT Left 2012    WITH CATARACT EXTRACTION//DONE AT [facility]    CATARACT EXTRACTION W/  INTRAOCULAR LENS IMPLANT Left 2012    WITH BAERVEDT//DONE AT [facility]    CATARACT EXTRACTION W/  INTRAOCULAR LENS IMPLANT Right 09/26/2018    COMPLEX ()    CB DESTRUCTION WITH CYCLO G6 Left 02/15/2017        CYST REMOVAL      HEART CATH-LEFT N/A 5/6/2016    Performed by Mike Magana MD at [facility] CATH LAB    INSERTION, IOL PROSTHESIS Right 9/26/2018    Performed by Perla Cortés MD at [facility] OR [address] [place] [address]    INTRAOCULAR PROSTHESES INSERTION Right 9/26/2018    Procedure: INSERTION, IOL PROSTHESIS;  Surgeon: Perla Cortés MD;  Location: [facility] OR [address] [place] [address];  Service: Ophthalmology;  Laterality: Right;    PHACOEMULSIFICATION OF CATARACT Right 9/26/2018    Procedure: PHACOEMULSIFICATION, CATARACT;  Surgeon: Perla Cortés MD;  Location: [facility] OR [address] [place] [address];  Service: Ophthalmology;  Laterality: Right;    PHACOEMULSIFICATION, CATARACT Right 9/26/2018    Performed by Perla Cortés MD at [facility] OR [address] [place] [address]    Right foot surgery  10/2014    TOE AMPUTATION      TONSILLECTOMY      TRABECULECTOMY/G 6 LASER Left 2/15/2017    Performed by Perla Cortés MD at [facility] OR [address] [place] [address]     Clinical Decision Making:     History  Co-morbidities and personal factors that may impact the plan of care Examination  Body Structures and Functions, activity limitations and participation restrictions that may impact the plan of care Clinical Presentation   Decision Making/ Complexity Score   Co-morbidities:   [] Time since onset of injury / illness / exacerbation  [] Status  of current condition  []Patient's cognitive status and safety concerns    [] Multiple Medical Problems (see med hx)  Personal Factors:   [] Patient's age  [] Prior Level of function   [] Patient's home situation (environment and family support)  [] Patient's level of motivation  [x] Expected progression of patient      HISTORY:(criteria)    [] 67872 - no personal factors/history    [x] 61391 - has 1-2 personal factor/comorbidity     [] 99786 - has >3 personal factor/comorbidity     Body Regions:  [] Objective examination findings  [] Head     []  Neck  [] Trunk   [] Upper Extremity  [x] Lower Extremity    Body Systems:  [] For communication ability, affect, cognition, language, and learning style: the assessment of the ability to make needs known, consciousness, orientation (person, place, and time), expected emotional /behavioral responses, and learning preferences (eg, learning barriers, education  needs)  [x] For the neuromuscular system: a general assessment of gross coordinated movement (eg, balance, gait, locomotion, transfers, and transitions) and motor function  (motor control and motor learning)  [x] For the musculoskeletal system: the assessment of gross symmetry, gross range of motion, gross strength, height, and weight  [x] For the integumentary system: the assessment of pliability(texture), presence of scar formation, skin color, and skin integrity  [x] For cardiovascular/pulmonary system: the assessment of heart rate, respiratory rate, blood pressure, and edema     Activity limitations:    [] Patient's cognitive status and saf ety concerns          [x] Status of current condition      [] Weight bearing restriction  [x] Cardiopulmunary Restriction    Participation Restrictions:   [] Goals and goal agreement with the patient     [] Rehab potential (prognosis) and probable outcome      Examination of Body System: (criteria)    [x] 05523 - addressing 1-2 elements    [] 38268 - addressing a total of 3 or  more elements     [] 79748 -  Addressing a total of 4 or more elements         Clinical Presentation: (criteria)  Evolving - 10995     On examination of body system using standardized tests and measures patient presents with 1-2 elements from any of the following: body structures and functions, activity limitations, and/or participation restrictions.  Leading to a clinical presentation that is considered evolving with changing characteristics                              Clinical Decision Making  (Eval Complexity):  Low- 03007     Time Tracking:     PT Received On: 10/13/18  PT Start Time: 1408     PT Stop Time: 1454  PT Total Time (min): 46 min     Billable Minutes: Evaluation 12 and Gait Training 12      Valentine Melchor, PT  10/13/2018

## 2018-10-13 NOTE — ASSESSMENT & PLAN NOTE
LLE concerning for cellulitis. Blood cultures with Strep C which is unlikely to be a contaminant. Wound care consulted. Treated empirically with meropenem and vanc. Vanc stopped as meropenam would cover Strep.

## 2018-10-13 NOTE — NURSING
Notified Dr Estuardo damico in person that patient has a hanging toe nail on right foot and the top of his toe nail plate is off; patient says he hit his foot on the tray table.  Patient denies pain. I wrapped with gauze and tape. Verbalized understanding.

## 2018-10-13 NOTE — PROGRESS NOTES
Pt seen and examines at bedside this AM.  NAEON.  Pain controlled.    PE:    General WDWN, NAD                 Extremity: right hand mild swelling over dorsal wrist at radiocarpal joint  No cellulitis  No erythema  Mild pain with palpation   strength intact     Left leg with open draining areas of cellulitis and erythema with large aprox 7x7cm fluid filled blister on medial plantar aspect of foot.      A/P  58 y.o.male with right wrist AVN and mild DJD likely from a  Previous trauma     Left leg cellulitis with erythema     Continue Abx for left leg. On meropenem now  Conservative tx with NSAID for right wrist  Ice and PT can help as well.  Pt must elevate leg  Will follow ESR/CRP 39/360-  Will follow

## 2018-10-13 NOTE — CONSULTS
Reason for Consult:      Swelling of legs    HPI:    Pt is a 58 y.o. year old male came for evaluation of pain in right hand.  His sister noticed swelling of his legs.  Patient told me that the legs have been swollen for about one week.  He also noticed that his abdomen was distended for the past one year or so.  He had an ultrasound of abdomen done that has revealed hepatosplenomegaly and ascites.  Patient denied history of significant alcohol abuse. NO IV drug abuse.  His HCV anti body was negative in 2016.  He has had mild intermittent abdominal pain.  He has occasional constipation and diarrhea.  He has had a  Colonoscopy done at [facility] four years ago and he was told to come back in 10 years.  We gatito requested to see him for evaluation of hepatosplenomegaly and ascites.  His liver profile has shown mild abnormality of liver profile for the past two days.    Past Medical History:   Diagnosis Date    Arthritis     Diabetes mellitus     Diabetic retinopathy     Glaucoma     Gout     Hyperlipemia     Hypertension        Past Surgical History:   Procedure Laterality Date    AHMED GLAUCOMA IMPLANT Left 2011    DONE AT [facility]    BAERVELDT GLAUCOMA IMPLANT Left 2012    WITH CATARACT EXTRACTION//DONE AT [facility]    CATARACT EXTRACTION W/  INTRAOCULAR LENS IMPLANT Left 2012    WITH BAERVEDT//DONE AT [facility]    CATARACT EXTRACTION W/  INTRAOCULAR LENS IMPLANT Right 09/26/2018    COMPLEX ()    CB DESTRUCTION WITH CYCLO G6 Left 02/15/2017        CYST REMOVAL      HEART CATH-LEFT N/A 5/6/2016    Performed by Mike Magana MD at [facility] CATH LAB    INSERTION, IOL PROSTHESIS Right 9/26/2018    Performed by Perla Cortés MD at [facility] OR [address] [place] [address]    INTRAOCULAR PROSTHESES INSERTION Right 9/26/2018    Procedure: INSERTION, IOL PROSTHESIS;  Surgeon: Perla Cortés MD;  Location: [facility] OR [address] [place] [address];  Service: Ophthalmology;  Laterality: Right;    PHACOEMULSIFICATION  OF CATARACT Right 9/26/2018    Procedure: PHACOEMULSIFICATION, CATARACT;  Surgeon: Perla Cortés MD;  Location: [facility] OR [address] [place] [address];  Service: Ophthalmology;  Laterality: Right;    PHACOEMULSIFICATION, CATARACT Right 9/26/2018    Performed by Perla Cortés MD at [facility] OR [address] [place] [address]    Right foot surgery  10/2014    TOE AMPUTATION      TONSILLECTOMY      TRABECULECTOMY/G 6 LASER Left 2/15/2017    Performed by Perla Cortés MD at [facility] OR [address] [place] [address]       Review of patient's allergies indicates:   Allergen Reactions    Statins-hmg-coa reductase inhibitors      Generalized Pain    Onglyza [saxagliptin]     Penicillins Rash       No current facility-administered medications on file prior to encounter.      Current Outpatient Medications on File Prior to Encounter   Medication Sig Dispense Refill    allopurinol (ZYLOPRIM) 100 MG tablet Take 2 tablets (200 mg total) by mouth once daily. 180 tablet 3    amLODIPine (NORVASC) 5 MG tablet TAKE 1 TABLET(5 MG) BY MOUTH EVERY DAY 30 tablet 0    aspirin 81 MG Chew Take 81 mg by mouth once daily.      benazepril (LOTENSIN) 40 MG tablet TAKE 1 TABLET(40 MG) BY MOUTH TWICE DAILY 60 tablet 0    brimonidine 0.1% (ALPHAGAN P) 0.1 % Drop Place 1 drop into both eyes 3 (three) times daily. 15 mL 12    coenzyme Q10 100 mg capsule Take 100 mg by mouth every morning.      dorzolamide-timolol 2-0.5% (COSOPT) 22.3-6.8 mg/mL ophthalmic solution Place 1 drop into both eyes 3 (three) times daily. 10 mL 12    ezetimibe (ZETIA) 10 mg tablet Take 1 tablet (10 mg total) by mouth once daily. 30 tablet 2    fish oil-omega-3 fatty acids 300-1,000 mg capsule Take 2 capsules (2 g total) by mouth 2 (two) times daily. (Patient taking differently: Take 1 g by mouth 2 (two) times daily. ) 120 capsule 5    insulin aspart U-100 (NOVOLOG) 100 unit/mL InPn pen Inject 12 Units into the skin 3 (three) times daily with meals. 2 Box 11    insulin glargine-lixisenatide (SOLIQUA 100/33) 100  "unit-33 mcg/mL InPn Inject 34 units once daily 5 Syringe 2    ketorolac 0.5% (ACULAR) 0.5 % Drop Place 1 drop into the right eye 3 (three) times daily. 1 Bottle 2    MULTIVIT,THER IRON,CA,FA & MIN (MULTIVITAMIN) Tab Take 1 tablet by mouth every morning.       nitroGLYCERIN (NITROSTAT) 0.4 MG SL tablet Place 1 tablet (0.4 mg total) under the tongue every 5 (five) minutes as needed for Chest pain (If CP persists go to ER. If taking NTG notify MD.). 20 tablet 1    pen needle, diabetic 31 gauge x 1/4" Ndle Use as directed 100 each 11    prednisoLONE acetate (PRED FORTE) 1 % DrpS Place 1 drop into the right eye 3 (three) times daily. 1 Bottle 2     Scheduled Meds:   allopurinol  200 mg Oral Daily    amLODIPine  5 mg Oral Daily    aspirin  81 mg Oral Daily    benazepril  40 mg Oral Daily    brimonidine 0.1%  1 drop Both Eyes TID    dorzolamide-timolol 2-0.5%  1 drop Both Eyes TID    ezetimibe  10 mg Oral Daily    heparin (porcine)  5,000 Units Subcutaneous Q8H    insulin detemir U-100  20 Units Subcutaneous QHS    ketorolac 0.5%  1 drop Right Eye TID    meropenem (MERREM) IVPB  1 g Intravenous Q12H    multivit-iron-FA-calcium-mins  1 tablet Oral QAM    prednisoLONE acetate  1 drop Right Eye TID     Continuous Infusions:  PRN Meds:.cloNIDine, dextrose 50%, dextrose 50%, glucagon (human recombinant), glucose, glucose, influenza, insulin aspart U-100    Social History     Socioeconomic History    Marital status: Legally      Spouse name: Not on file    Number of children: Not on file    Years of education: 14    Highest education level: Not on file   Social Needs    Financial resource strain: Not on file    Food insecurity - worry: Not on file    Food insecurity - inability: Not on file    Transportation needs - medical: Not on file    Transportation needs - non-medical: Not on file   Occupational History    Not on file   Tobacco Use    Smoking status: Former Smoker     Packs/day: 1.00 "     Years: 3.00     Pack years: 3.00     Last attempt to quit: 1984     Years since quittin.2    Smokeless tobacco: Never Used   Substance and Sexual Activity    Alcohol use: Yes     Alcohol/week: 0.6 oz     Types: 1 Cans of beer per week     Comment: Occasional    Drug use: No    Sexual activity: Not Currently   Other Topics Concern    Not on file   Social History Narrative    Not on file       Family history:  Family History   Problem Relation Age of Onset    Diabetes Mother     Heart disease Brother     No Known Problems Father     No Known Problems Sister     No Known Problems Maternal Aunt     No Known Problems Maternal Uncle     No Known Problems Paternal Aunt     No Known Problems Paternal Uncle     No Known Problems Maternal Grandmother     No Known Problems Maternal Grandfather     No Known Problems Paternal Grandmother     No Known Problems Paternal Grandfather     Anemia Neg Hx     Arrhythmia Neg Hx     Asthma Neg Hx     Clotting disorder Neg Hx     Fainting Neg Hx     Heart attack Neg Hx     Heart failure Neg Hx     Hyperlipidemia Neg Hx     Hypertension Neg Hx     Stroke Neg Hx     Atrial Septal Defect Neg Hx     Amblyopia Neg Hx     Blindness Neg Hx     Glaucoma Neg Hx     Macular degeneration Neg Hx     Retinal detachment Neg Hx     Strabismus Neg Hx          Endoscopic History:  Colonoscopy four years ago and it was normal (Per patient)    Review of Systems -     Constitutional: no fever or chills  Eyes: no visual changes  ENT: no nasal congestion or sore throat  Respiratory: no cough or shorness of breath  Cardiovascular: no chest pain or palpitations  Gastrointestinal: abdominal distension.  Genitourinary: no hematuria or dysuria    Physical Exam -    General: Well developed, well nourished, no apparent distress  Vital Signs Range (Last 24H):  Temp:  [96.3 °F (35.7 °C)-98.4 °F (36.9 °C)]   Pulse:  [87-97]   Resp:  [16-18]   BP: (118-145)/(68-89)   SpO2:   [94 %-97 %]   HEENT: Anicteric, PERRLA  CVS: S1, S2, no murmers/rubs  Lungs: CTA-B, normal excursion  Abdomen: Ascites.  Liver could not be felt because of ascites  Extremities: edema bilateral. Amputation of right foot toe x2.  Skin: No rashes/lesions.    Labs:  Recent Labs   Lab  10/13/18   0511   CALCIUM  9.1   PROT  6.4   NA  134*   K  4.4   CO2  20*   CL  103   BUN  60*   CREATININE  1.1   ALKPHOS  197*   ALT  95*   AST  94*   BILITOT  2.4*     Recent Results (from the past 336 hour(s))   CBC auto differential    Collection Time: 10/13/18  5:11 AM   Result Value Ref Range    WBC 12.88 (H) 3.90 - 12.70 K/uL    Hemoglobin 13.7 (L) 14.0 - 18.0 g/dL    Hematocrit 39.1 (L) 40.0 - 54.0 %    Platelets 194 150 - 350 K/uL   CBC auto differential    Collection Time: 10/12/18  5:13 AM   Result Value Ref Range    WBC 12.24 3.90 - 12.70 K/uL    Hemoglobin 13.3 (L) 14.0 - 18.0 g/dL    Hematocrit 38.9 (L) 40.0 - 54.0 %    Platelets 179 150 - 350 K/uL   CBC auto differential    Collection Time: 10/11/18  5:15 AM   Result Value Ref Range    WBC 12.18 3.90 - 12.70 K/uL    Hemoglobin 13.5 (L) 14.0 - 18.0 g/dL    Hematocrit 39.2 (L) 40.0 - 54.0 %    Platelets 166 150 - 350 K/uL     No results for input(s): PT, INR, APTT in the last 24 hours.    Imaging:  Ultrasound of abdomen  Impression       Hepatosplenomegaly, ascites, and right pleural effusion.  No liver mass or biliary ductal dilatation.    Nonspecific gallbladder wall thickening, which may relate to underlying hepatic dysfunction          Assessment:  Patient Active Problem List:    Hepatosplenomegaly with ascites R/O cirrhosis  Abnormal liver profile   Diagnosis    Epiretinal membrane, both eyes    Nuclear sclerotic cataract of right eye    Pseudophakia, left eye    Ptosis, left eyelid    DM type 2 with diabetic peripheral neuropathy    HLD (hyperlipidemia)    Neovascular glaucoma of both eyes    Tophaceous gout    Chest pain    Essential hypertension    CAD  (coronary artery disease)    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 HBsAg and Anti HCV.    2. Iron and TIBC  3. Diagnostic peracentasis on Monday  4 2 gram sodium diet      I would like to take this opportunity to thank you for this consult.  If you have any questions or concerns, please do not hesitate to contact me.       Nikole Stack MD

## 2018-10-13 NOTE — PLAN OF CARE
Problem: Patient Care Overview  Goal: Plan of Care Review  Outcome: Ongoing (interventions implemented as appropriate)   10/13/18 1700   Coping/Psychosocial   Plan Of Care Reviewed With patient   Patient is awake alert and oriented. IV antibiotics as ordered. Performed dressing change on left leg as ordered; elevated on pillows.  Denies pain N/V. Tolerating po intake. Hourly rounds, call light in reach, free of falls. Continue with plan of care as ordered. No distress noted.

## 2018-10-13 NOTE — NURSING
Patient fluid filled blister on left foot burst. Removed dressing, cleaned with chlorhexidine, NS, applied aqua donna extra, dry gauze and secured with kerlix as ordered. Patient denies pain, tolerated well. Heels elevated on pillows.

## 2018-10-13 NOTE — PT/OT/SLP EVAL
Occupational Therapy   Evaluation    Name: Marvin Ray  MRN: [id number]  Admitting Diagnosis:  Left leg cellulitis      Recommendations:     Discharge Recommendations: home health OT  Discharge Equipment Recommendations:  (TBD)  Barriers to discharge:  None    History:     Occupational Profile:  Living Environment: Pt lives sister and his brother in law in [facility] with ramp.  Previous level of function: Ind  Roles and Routines: Ind  Equipment Used at Home:  cane, straight, cane, quad, wheelchair  Assistance upon Discharge: sister, brother in law    Past Medical History:   Diagnosis Date    Arthritis     Diabetes mellitus     Diabetic retinopathy     Glaucoma     Gout     Hyperlipemia     Hypertension        Past Surgical History:   Procedure Laterality Date    AHMED GLAUCOMA IMPLANT Left 2011    DONE AT [facility]    BAERVELDT GLAUCOMA IMPLANT Left 2012    WITH CATARACT EXTRACTION//DONE AT [facility]    CATARACT EXTRACTION W/  INTRAOCULAR LENS IMPLANT Left 2012    WITH BAERVEDT//DONE AT [facility]    CATARACT EXTRACTION W/  INTRAOCULAR LENS IMPLANT Right 09/26/2018    COMPLEX ()    CB DESTRUCTION WITH CYCLO G6 Left 02/15/2017        CYST REMOVAL      HEART CATH-LEFT N/A 5/6/2016    Performed by Mike Magana MD at [facility] CATH LAB    INSERTION, IOL PROSTHESIS Right 9/26/2018    Performed by Perla Cortés MD at [facility] OR [address] [place] [address]    INTRAOCULAR PROSTHESES INSERTION Right 9/26/2018    Procedure: INSERTION, IOL PROSTHESIS;  Surgeon: Perla Cortés MD;  Location: [facility] OR [address] [place] [address];  Service: Ophthalmology;  Laterality: Right;    PHACOEMULSIFICATION OF CATARACT Right 9/26/2018    Procedure: PHACOEMULSIFICATION, CATARACT;  Surgeon: Perla Cortés MD;  Location: [facility] OR [address] [place] [address];  Service: Ophthalmology;  Laterality: Right;    PHACOEMULSIFICATION, CATARACT Right 9/26/2018    Performed by Perla Cortés MD at [facility] OR [address] [place] [address]    Right foot surgery  10/2014    TOE  "AMPUTATION      TONSILLECTOMY      TRABECULECTOMY/G 6 LASER Left 2/15/2017    Performed by [facility] at [facility] OR [address] [place] [address]       Subjective     Chief Complaint: Pain  Patient/Family Comments/goals: to be in less pain and have more endurance    Pain/Comfort:  · Pain Rating 1: 10/10  · Location - Side 1: Bilateral  · Location 1: foot  · Pain Addressed 1: Reposition, Distraction, Cessation of Activity  · Pain Rating Post-Intervention 1: 0/10  · Pain Rating 2: 10/10  · Location - Side 2: Right  · Location 2: wrist  · Pain Addressed 2: Distraction  · Pain Rating Post-Intervention 2: 10/10    Patients cultural, spiritual, Cheondoism conflicts given the current situation:      Objective:     Communicated with: RN ([facility]) prior to session.  Patient found sitting EOB    upon OT entry to room.    General Precautions: Standard, fall, vision impaired   Orthopedic Precautions:(Per Dr. Gray, conserative tx of R wrist with NSAIDS, ice)   Braces: N/A     Occupational Performance:      Functional Mobility/Transfers:  · Patient completed Sit <> Stand Transfer with minimum assistance  with  hand-held assist  (hand held in L hand; elbow support on R UE)  · Functional Mobility: Min A to ambulate around bed with pt reports "legs getting weak"    Activities of Daily Living:  · Grooming: supervision EOB  · Upper Body Dressing: supervision Nathanael shirt  · Lower Body Dressing: supervision Nathanael shorts    Cognitive/Visual Perceptual:  Cognitive/Psychosocial Skills:     -       Oriented to: Person, Place, Time and Situation   -       Follows Commands/attention:Follows one-step commands  -       Communication: clear/fluent  -       Memory: No Deficits noted  -       Safety awareness/insight to disability: impaired   -       Mood/Affect/Coping skills/emotional control: Appropriate to situation  Visual/Perceptual:      -Impaired  Pt reports recent cataract sx in R eye and 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; both blurry vision     Physical " Exam:  Balance:    -       Sitting Mod I; standing Min A HHA  Skin integrity: gauze around L calf and sugrical shoe liner on L foot  Edema:  In B LEs, and dorsum of R hand and also in R fingers  Sensation:    -       Impaired  Pt reports numbness in B hands along median pathway; light tough intact everywhere else on UEs   Motor Planning:    -       WFL  Dominant hand:    -       Right  Upper Extremity Range of Motion:     -       Right Upper Extremity: Shoulder, elbow and forearm WFL (gout bump on elbow, pt with reports of no pain there and did not affect ROM) wrist/fingers deferred 2* carpal necrosis  -       Left Upper Extremity: WFL (gout bump on elbow, pt no pain and did not affect ROM)    Upper Extremity Strength:    -       Right Upper Extremity: Shoulder 4/5; elbow 5/5; deferred wrist  -       Left Upper Extremity: Shoulder 4/5; elbow 5/5, wrist 4/5    Strength:    -       Right Upper Extremity: limited; very weak  -       Left Upper Extremity: WFL   Fine Motor Coordination:    -       Impaired  Left hand thumb/finger opposition skills impaired; increased time and decreased ROM and Right hand thumb/finger opposition skills increased time and decreased ROM  Gross motor coordination:   WFL    AMPAC 6 Click ADL:  AMPAC Total Score: 21      Education:    Patient left Sitting EOB with all lines intact    Assessment:     Marvin Ray is a 58 y.o. male with a medical diagnosis of Left leg cellulitis.  He presents with the following performance deficits affecting function: weakness, impaired endurance, impaired sensation, impaired self care skills, impaired functional mobilty, impaired balance, visual deficits, gait instability, decreased coordination, decreased upper extremity function, decreased lower extremity function, pain, decreased safety awareness, decreased ROM, edema, impaired skin, impaired fine motor, impaired coordination, impaired cardiopulmonary response to activity, orthopedic precautions.   "    Rehab Prognosis: Fair; patient would benefit from acute skilled OT services to address these deficits and reach maximum level of function.         Clinical Decision Makin.  OT Low:  "Pt evaluation falls under low complexity for evaluation coding due to performance deficits noted in 1-3 areas as stated above and 0 co-morbities affecting current functional status. Data obtained from problem focused assessments. No modifications or assistance was required for completion of evaluation. Only brief occupational profile and history review completed."     Plan:     Patient to be seen 5 x/week to address the above listed problems via self-care/home management, therapeutic activities, therapeutic exercises  · Plan of Care Expires: 10/27/18  · Plan of Care Reviewed with: patient    This Plan of care has been discussed with the patient who was involved in its development and understands and is in agreement with the identified goals and treatment plan    GOALS:   Multidisciplinary Problems     Occupational Therapy Goals        Problem: Occupational Therapy Goal    Goal Priority Disciplines Outcome Interventions   Occupational Therapy Goal     OT, PT/OT     Description:  Goals to be met by: 10/27/18     Patient will increase functional independence with ADLs by performing:    UE Dressing with Modified [place].  LE Dressing with Set-up Assistance.  Grooming while standing at sink with Contact Guard Assistance.  Toileting from toilet with Stand-by Assistance for hygiene and clothing management.   Pt to participate in L hand [facility] tasks to increase use for ADLs.                       Time Tracking:     OT Date of Treatment: 10/13/18  OT Start Time: 1430  OT Stop Time: 1455  OT Total Time (min): 25 min    Billable Minutes:Evaluation 25    Ping Mcqueen OT  10/13/2018    "

## 2018-10-13 NOTE — PROGRESS NOTES
[facility] Hospital Medicine  Progress Note    Patient Name: Marvin Ray  MRN: [id number]  Patient Class: IP- Inpatient   Admission Date: 10/10/2018  Length of Stay: 2 days  Attending Physician: Kirsten Trinh MD  Primary Care Provider: Rubén Davis MD        Subjective:     Principal Problem:Left leg cellulitis    HPI:  Mr. Ray is a pleasant 59 yo man with IDDM (last A1c >14% 7/2018), essential HTN, HLD, gout, former tobacco abuse, and PVD with remote resection of the right 1st and 2nd toe [facility]. He has chronic right hand pain from arthritis but he felt the pain had gotten much worse in the past 2-3 days. He describes a shooting, aching pain, particularly through his thumb, 2nd, and 3rd fingers. He has limited ROM due to arthritis but reports that this is not new. He also has limited wrist flexion and extension.   In the [facility] he was noted to have concerning findings of his left lower extremity. He has chronic woody appearance and hyperpigmentation of the skin on his LLE, but in the past couple of days the skin has been more erythematous. He also notes new edema of the LLE as well as a new blister on the dorsum of his foot. He had decreased pulses though they could be found with doppler.     Hospital Course:  Mr. Ray was admitted due to concern for LLE cellulitis. Per the [place] ER physician, she found his exam to be concerning for necrotizing fasciitis, however upon my exam I had very low clinical concern for this. He did appear to have chronic venous insufficiency and likely PVD. Vascular was consulted.  He had chronic hyperpigmentation of his LLE c/w chronic venous stasis with an overlying area of erythema and calor concerning for cellulitis. He was started on empiric antibiotics with meropenam and vancomycin (penicillin allergic). He did have a slightly elevated lactate that remained stable. Blood cultures growing group G  Strep, discussed with micro and this is not likely a contaminant. Culture results pending.  He was found to have slightly elevated LFTs. Abdominal US with hepatosplenomegaly, ascites, and a right pleural effusion. GI consulted.  His renal function was also slightly declined from his previous baseline. IV fluid challenge with no improvement. Fluids stopped as pleural effusion noted. This is likely his new baseline. Will need nephrology follow up as outpatient.    Interval history:  Blister on the bottom of his left foot is much more extensive now extending around to the top of his foot.    Review of Systems   Constitutional: Negative for chills and fever.   HENT: Negative for congestion and rhinorrhea.    Eyes: Negative for photophobia and visual disturbance.   Respiratory: Positive for shortness of breath. Negative for cough.    Cardiovascular: Positive for leg swelling. Negative for chest pain.   Gastrointestinal: Negative for abdominal pain, constipation, diarrhea and nausea.   Genitourinary: Negative for difficulty urinating and dysuria.   Musculoskeletal: Positive for arthralgias. Negative for gait problem and joint swelling.   Skin: Positive for color change and wound.   Neurological: Negative for dizziness and light-headedness.   Psychiatric/Behavioral: Negative for confusion and dysphoric mood.     Objective:     Vital Signs (Most Recent):  Temp: 96.3 °F (35.7 °C) (10/12/18 2018)  Pulse: 97 (10/12/18 2018)  Resp: 18 (10/12/18 2018)  BP: (!) 145/89 (10/12/18 2018)  SpO2: 95 % (10/12/18 2018) Vital Signs (24h Range):  Temp:  [96.3 °F (35.7 °C)-98.4 °F (36.9 °C)] 96.3 °F (35.7 °C)  Pulse:  [83-97] 97  Resp:  [18-20] 18  SpO2:  [95 %-97 %] 95 %  BP: (104-145)/(62-89) 145/89     Weight: 96.2 kg (212 lb)  Body mass index is 29.57 kg/m².    Physical Exam   Constitutional: He is oriented to person, place, and time. He appears well-developed and well-nourished. No distress.   HENT:   Right Ear: External ear normal.    Left Ear: External ear normal.   Nose: Nose normal.   Mouth/Throat: Oropharynx is clear and moist.   Eyes: Conjunctivae and EOM are normal. Pupils are equal, round, and reactive to light. No scleral icterus.   Neck: Normal range of motion. Neck supple. No thyromegaly present.   Cardiovascular: Normal rate and normal heart sounds. Exam reveals no friction rub.   No murmur heard.  Pulmonary/Chest: Effort normal and breath sounds normal. No respiratory distress. He has no wheezes. He has no rales.   Abdominal: Soft. Bowel sounds are normal. He exhibits no mass. There is no tenderness.   Obese   Musculoskeletal: He exhibits deformity. He exhibits no edema.   Bilateral hands with synovitis particularly of the PIPs right much worse than left and right medial MCPs. Limited ROM in the hands and wrist bilaterally.  Right great and second toe amputated, well healed.   Neurological: He is alert and oriented to person, place, and time. No cranial nerve deficit.   Skin: Skin is warm and dry. No pallor.   Left lower shin with woody appearance, hyperpigmented - less erythema than previously and no longer warm. Sloughing skin, now moist with wound care ointment.    Psychiatric: He has a normal mood and affect. His behavior is normal. Thought content normal.         CRANIAL NERVES     CN III, IV, VI   Pupils are equal, round, and reactive to light.  Extraocular motions are normal.         Significant Labs: All pertinent labs within the past 24 hours have been reviewed.    Significant Imaging: I have reviewed and interpreted all pertinent imaging results/findings within the past 24 hours.    Assessment/Plan:      * Left leg cellulitis    LLE concerning for cellulitis. Blood cultures with Strep C which is unlikely to be a contaminant. Wound care consulted. Treated empirically with meropenem and vanc. Vanc stopped as meropenam would cover Strep.        Stage 3 chronic kidney disease    This is likely his new baseline. Monitor.         "  Hepatosplenomegaly    He was noted to have abnormal LFT and US abdomen showed HSM. GI consulted. Negative HCV in 2015.        Right wrist pain    Acute on chronic right wrist pain. Xrays showed "significant abnormality with resorption or resection involving the majority of the lunate". Ortho consulted.        PVD (peripheral vascular disease)    Given his tobacco history, previous poor wound healing, calcifications seen on Xray, and skin findings, PVD highly suspected. Vascular consulted, recs appreciated.         Essential hypertension    Continue home amlodipine and benazepril. PRN clonidine available.        Tophaceous gout    Continue home allopurinol. Will need follow up as outpatient. He has progressive deformities of his hands. Does not appear to have an acute flare.        Neovascular glaucoma of both eyes    Continue eye drops.        HLD (hyperlipidemia)    Continue Zetia.        DM type 2 with diabetic peripheral neuropathy    Continue basal/bolus insulin. Adjust as needed. He has been seen in endocrine clinic. Continue to follow up with them as outpatient.          VTE Risk Mitigation (From admission, onward)        Ordered     heparin (porcine) injection 5,000 Units  Every 8 hours      10/10/18 1710     IP VTE HIGH RISK PATIENT  Once      10/10/18 1710              Kirsten Trinh MD  Department of Hospital Medicine   [facility]  "

## 2018-10-14 LAB
ALBUMIN SERPL BCP-MCNC: 2.5 G/DL
ALP SERPL-CCNC: 218 U/L
ALT SERPL W/O P-5'-P-CCNC: 95 U/L
ANION GAP SERPL CALC-SCNC: 11 MMOL/L
AST SERPL-CCNC: 76 U/L
BASOPHILS # BLD AUTO: 0.07 K/UL
BASOPHILS NFR BLD: 0.6 %
BILIRUB SERPL-MCNC: 2.7 MG/DL
BUN SERPL-MCNC: 50 MG/DL
CALCIUM SERPL-MCNC: 9.2 MG/DL
CHLORIDE SERPL-SCNC: 103 MMOL/L
CO2 SERPL-SCNC: 21 MMOL/L
CREAT SERPL-MCNC: 0.9 MG/DL
DIFFERENTIAL METHOD: ABNORMAL
EOSINOPHIL # BLD AUTO: 0 K/UL
EOSINOPHIL NFR BLD: 0.3 %
ERYTHROCYTE [DISTWIDTH] IN BLOOD BY AUTOMATED COUNT: 16.6 %
EST. GFR  (AFRICAN AMERICAN): >60 ML/MIN/1.73 M^2
EST. GFR  (NON AFRICAN AMERICAN): >60 ML/MIN/1.73 M^2
GLUCOSE SERPL-MCNC: 111 MG/DL
HCT VFR BLD AUTO: 42.4 %
HGB BLD-MCNC: 15 G/DL
INR PPP: 1.2
LYMPHOCYTES # BLD AUTO: 0.8 K/UL
LYMPHOCYTES NFR BLD: 6.5 %
MCH RBC QN AUTO: 31.1 PG
MCHC RBC AUTO-ENTMCNC: 35.4 G/DL
MCV RBC AUTO: 88 FL
MONOCYTES # BLD AUTO: 1.2 K/UL
MONOCYTES NFR BLD: 9.5 %
NEUTROPHILS # BLD AUTO: 10.1 K/UL
NEUTROPHILS NFR BLD: 83.9 %
PLATELET # BLD AUTO: 187 K/UL
PMV BLD AUTO: 10.9 FL
POCT GLUCOSE: 113 MG/DL (ref 70–110)
POCT GLUCOSE: 122 MG/DL (ref 70–110)
POCT GLUCOSE: 135 MG/DL (ref 70–110)
POCT GLUCOSE: 191 MG/DL (ref 70–110)
POTASSIUM SERPL-SCNC: 4.6 MMOL/L
PROT SERPL-MCNC: 6.5 G/DL
PROTHROMBIN TIME: 12.6 SEC
RBC # BLD AUTO: 4.82 M/UL
SODIUM SERPL-SCNC: 135 MMOL/L
WBC # BLD AUTO: 12.11 K/UL

## 2018-10-14 PROCEDURE — 36415 COLL VENOUS BLD VENIPUNCTURE: CPT

## 2018-10-14 PROCEDURE — 85025 COMPLETE CBC W/AUTO DIFF WBC: CPT

## 2018-10-14 PROCEDURE — 85610 PROTHROMBIN TIME: CPT

## 2018-10-14 PROCEDURE — 80053 COMPREHEN METABOLIC PANEL: CPT

## 2018-10-14 PROCEDURE — 11000001 HC ACUTE MED/SURG PRIVATE ROOM

## 2018-10-14 PROCEDURE — 63600175 PHARM REV CODE 636 W HCPCS: Performed by: EMERGENCY MEDICINE

## 2018-10-14 PROCEDURE — 63600175 PHARM REV CODE 636 W HCPCS: Performed by: INTERNAL MEDICINE

## 2018-10-14 PROCEDURE — 25000003 PHARM REV CODE 250: Performed by: INTERNAL MEDICINE

## 2018-10-14 RX ORDER — ACETAMINOPHEN 325 MG/1
650 TABLET ORAL EVERY 6 HOURS PRN
Status: DISCONTINUED | OUTPATIENT
Start: 2018-10-14 | End: 2018-10-14

## 2018-10-14 RX ORDER — ACETAMINOPHEN 325 MG/1
650 TABLET ORAL EVERY 6 HOURS PRN
Status: DISCONTINUED | OUTPATIENT
Start: 2018-10-14 | End: 2018-10-17 | Stop reason: HOSPADM

## 2018-10-14 RX ADMIN — HEPARIN SODIUM 5000 UNITS: 5000 INJECTION, SOLUTION INTRAVENOUS; SUBCUTANEOUS at 05:10

## 2018-10-14 RX ADMIN — BRIMONIDINE TARTRATE 1 DROP: 1 SOLUTION/ DROPS OPHTHALMIC at 09:10

## 2018-10-14 RX ADMIN — PREDNISOLONE ACETATE 1 DROP: 10 SUSPENSION/ DROPS OPHTHALMIC at 09:10

## 2018-10-14 RX ADMIN — EZETIMIBE 10 MG: 10 TABLET ORAL at 09:10

## 2018-10-14 RX ADMIN — BRIMONIDINE TARTRATE 1 DROP: 1 SOLUTION/ DROPS OPHTHALMIC at 04:10

## 2018-10-14 RX ADMIN — MULTIPLE VITAMINS W/ MINERALS TAB 1 TABLET: TAB at 06:10

## 2018-10-14 RX ADMIN — ASPIRIN 81 MG 81 MG: 81 TABLET ORAL at 09:10

## 2018-10-14 RX ADMIN — BENAZEPRIL HYDROCHLORIDE 40 MG: 40 TABLET, COATED ORAL at 09:10

## 2018-10-14 RX ADMIN — KETOROLAC TROMETHAMINE 1 DROP: 5 SOLUTION/ DROPS OPHTHALMIC at 09:10

## 2018-10-14 RX ADMIN — PREDNISOLONE ACETATE 1 DROP: 10 SUSPENSION/ DROPS OPHTHALMIC at 04:10

## 2018-10-14 RX ADMIN — CEFTRIAXONE 2 G: 2 INJECTION, SOLUTION INTRAVENOUS at 04:10

## 2018-10-14 RX ADMIN — DORZOLAMIDE HYDROCHLORIDE AND TIMOLOL MALEATE 1 DROP: 20; 5 SOLUTION/ DROPS OPHTHALMIC at 09:10

## 2018-10-14 RX ADMIN — AMLODIPINE BESYLATE 5 MG: 5 TABLET ORAL at 09:10

## 2018-10-14 RX ADMIN — ACETAMINOPHEN 650 MG: 325 TABLET ORAL at 05:10

## 2018-10-14 RX ADMIN — KETOROLAC TROMETHAMINE 1 DROP: 5 SOLUTION/ DROPS OPHTHALMIC at 04:10

## 2018-10-14 RX ADMIN — DORZOLAMIDE HYDROCHLORIDE AND TIMOLOL MALEATE 1 DROP: 20; 5 SOLUTION/ DROPS OPHTHALMIC at 04:10

## 2018-10-14 RX ADMIN — INSULIN DETEMIR 20 UNITS: 100 INJECTION, SOLUTION SUBCUTANEOUS at 09:10

## 2018-10-14 RX ADMIN — INSULIN ASPART 1 UNITS: 100 INJECTION, SOLUTION INTRAVENOUS; SUBCUTANEOUS at 09:10

## 2018-10-14 RX ADMIN — HEPARIN SODIUM 5000 UNITS: 5000 INJECTION, SOLUTION INTRAVENOUS; SUBCUTANEOUS at 09:10

## 2018-10-14 RX ADMIN — HEPARIN SODIUM 5000 UNITS: 5000 INJECTION, SOLUTION INTRAVENOUS; SUBCUTANEOUS at 03:10

## 2018-10-14 RX ADMIN — ALLOPURINOL 200 MG: 100 TABLET ORAL at 09:10

## 2018-10-14 NOTE — ASSESSMENT & PLAN NOTE
"Acute on chronic right wrist pain. Xrays showed "significant abnormality with resorption or resection involving the majority of the lunate". Ortho recs appreciated.  "

## 2018-10-14 NOTE — SUBJECTIVE & OBJECTIVE
Interval history:  Hit his right 2nd toe on his bedside tray and dislodged his toenail.    Review of Systems   Constitutional: Negative for chills and fever.   HENT: Negative for congestion and rhinorrhea.    Eyes: Negative for photophobia and visual disturbance.   Respiratory: Negative for cough and shortness of breath.    Cardiovascular: Positive for leg swelling. Negative for chest pain.   Gastrointestinal: Negative for abdominal pain, constipation, diarrhea and nausea.   Genitourinary: Negative for difficulty urinating and dysuria.   Musculoskeletal: Positive for arthralgias. Negative for gait problem and joint swelling.   Skin: Positive for color change and wound.   Neurological: Negative for dizziness and light-headedness.   Psychiatric/Behavioral: Negative for confusion and dysphoric mood.     Objective:     Vital Signs (Most Recent):  Temp: 97.9 °F (36.6 °C) (10/13/18 1715)  Pulse: 95 (10/13/18 1715)  Resp: 17 (10/13/18 1715)  BP: 135/68 (10/13/18 1715)  SpO2: 95 % (10/13/18 1715) Vital Signs (24h Range):  Temp:  [96.3 °F (35.7 °C)-97.9 °F (36.6 °C)] 97.9 °F (36.6 °C)  Pulse:  [87-97] 95  Resp:  [16-18] 17  SpO2:  [94 %-96 %] 95 %  BP: (118-145)/(68-89) 135/68     Weight: 96.2 kg (212 lb)  Body mass index is 29.57 kg/m².    Physical Exam   Constitutional: He is oriented to person, place, and time. He appears well-developed and well-nourished. No distress.   HENT:   Right Ear: External ear normal.   Left Ear: External ear normal.   Nose: Nose normal.   Mouth/Throat: Oropharynx is clear and moist.   Eyes: Conjunctivae and EOM are normal. Pupils are equal, round, and reactive to light. No scleral icterus.   Neck: Normal range of motion. Neck supple. No thyromegaly present.   Cardiovascular: Normal rate and normal heart sounds. Exam reveals no friction rub.   No murmur heard.  Pulmonary/Chest: Effort normal and breath sounds normal. No respiratory distress. He has no wheezes. He has no rales.   Abdominal: Soft.  Bowel sounds are normal. He exhibits no mass. There is no tenderness.   Obese   Musculoskeletal: He exhibits deformity. He exhibits no edema.   Bilateral hands with synovitis particularly of the PIPs right much worse than left and right medial MCPs. Limited ROM in the hands and wrist bilaterally.  Right great and second toe amputated, well healed.   Neurological: He is alert and oriented to person, place, and time. No cranial nerve deficit.   Skin: Skin is warm and dry. No pallor.   Left lower extremity wrapped with gauze with yellow drainage seeping through bandage.   Psychiatric: He has a normal mood and affect. His behavior is normal. Thought content normal.         CRANIAL NERVES     CN III, IV, VI   Pupils are equal, round, and reactive to light.  Extraocular motions are normal.         Significant Labs: All pertinent labs within the past 24 hours have been reviewed.    Significant Imaging: I have reviewed and interpreted all pertinent imaging results/findings within the past 24 hours.

## 2018-10-14 NOTE — NURSING
Wound care performed to left lower leg and foot. Cleansed with chlorhexidine gluconate and NS. Dressed with Aquacel Extra, covered with 4x4 gauze and then wrapped with Kerlix roll.     Right second toe has toe nail gone and dried blood. Cleansed toe and entire right foot with normal saline, applied a non-stick dressing to open wound on toe, then covered with 4x4, then wrapped with kerlix roll. Applied clean socks to both feet.    Patient tolerated very well. All needs met. Will continue to monitor closely.

## 2018-10-14 NOTE — PROGRESS NOTES
[facility] Hospital Medicine  Progress Note    Patient Name: Marvin Ray  MRN: [id number]  Patient Class: IP- Inpatient   Admission Date: 10/10/2018  Length of Stay: 3 days  Attending Physician: Kirsten Trinh MD  Primary Care Provider: Rubén Davis MD        Subjective:     Principal Problem:Left leg cellulitis    HPI:  Mr. Ray is a pleasant 59 yo man with IDDM (last A1c >14% 7/2018), essential HTN, HLD, gout, former tobacco abuse, and PVD with remote resection of the right 1st and 2nd toe [facility]. He has chronic right hand pain from arthritis but he felt the pain had gotten much worse in the past 2-3 days. He describes a shooting, aching pain, particularly through his thumb, 2nd, and 3rd fingers. He has limited ROM due to arthritis but reports that this is not new. He also has limited wrist flexion and extension.   In the [facility] he was noted to have concerning findings of his left lower extremity. He has chronic woody appearance and hyperpigmentation of the skin on his LLE, but in the past couple of days the skin has been more erythematous. He also notes new edema of the LLE as well as a new blister on the dorsum of his foot. He had decreased pulses though they could be found with doppler.     Hospital Course:  Mr. Ray was admitted due to concern for LLE cellulitis. Per the [place] [facility] physician, she found his exam to be concerning for necrotizing fasciitis, however upon my exam I had very low clinical concern for this. He did appear to have chronic venous insufficiency and likely PVD. Vascular was consulted.  He had chronic hyperpigmentation of his LLE c/w chronic venous stasis with an overlying area of erythema and calor concerning for cellulitis. He was started on empiric antibiotics with meropenam and vancomycin (penicillin allergic). He did have a slightly elevated lactate that remained stable. Blood cultures grew group G  Strep. Antibiotics narrowed to ceftriaxone with final susceptibility results on 10/13/18.  He was found to have slightly elevated LFTs. Abdominal US with hepatosplenomegaly, ascites, and a right pleural effusion. GI consulted. Plan for Dx paracentesis on 10/15/18.  His renal function was also slightly declined from his previous baseline. IV fluid challenge with no improvement. Fluids stopped as pleural effusion noted. This is likely his new baseline. Will need nephrology follow up as outpatient.    Interval history:  Hit his right 2nd toe on his bedside tray and dislodged his toenail.    Review of Systems   Constitutional: Negative for chills and fever.   HENT: Negative for congestion and rhinorrhea.    Eyes: Negative for photophobia and visual disturbance.   Respiratory: Negative for cough and shortness of breath.    Cardiovascular: Positive for leg swelling. Negative for chest pain.   Gastrointestinal: Negative for abdominal pain, constipation, diarrhea and nausea.   Genitourinary: Negative for difficulty urinating and dysuria.   Musculoskeletal: Positive for arthralgias. Negative for gait problem and joint swelling.   Skin: Positive for color change and wound.   Neurological: Negative for dizziness and light-headedness.   Psychiatric/Behavioral: Negative for confusion and dysphoric mood.     Objective:     Vital Signs (Most Recent):  Temp: 97.9 °F (36.6 °C) (10/13/18 1715)  Pulse: 95 (10/13/18 1715)  Resp: 17 (10/13/18 1715)  BP: 135/68 (10/13/18 1715)  SpO2: 95 % (10/13/18 1715) Vital Signs (24h Range):  Temp:  [96.3 °F (35.7 °C)-97.9 °F (36.6 °C)] 97.9 °F (36.6 °C)  Pulse:  [87-97] 95  Resp:  [16-18] 17  SpO2:  [94 %-96 %] 95 %  BP: (118-145)/(68-89) 135/68     Weight: 96.2 kg (212 lb)  Body mass index is 29.57 kg/m².    Physical Exam   Constitutional: He is oriented to person, place, and time. He appears well-developed and well-nourished. No distress.   HENT:   Right Ear: External ear normal.   Left Ear:  External ear normal.   Nose: Nose normal.   Mouth/Throat: Oropharynx is clear and moist.   Eyes: Conjunctivae and EOM are normal. Pupils are equal, round, and reactive to light. No scleral icterus.   Neck: Normal range of motion. Neck supple. No thyromegaly present.   Cardiovascular: Normal rate and normal heart sounds. Exam reveals no friction rub.   No murmur heard.  Pulmonary/Chest: Effort normal and breath sounds normal. No respiratory distress. He has no wheezes. He has no rales.   Abdominal: Soft. Bowel sounds are normal. He exhibits no mass. There is no tenderness.   Obese   Musculoskeletal: He exhibits deformity. He exhibits no edema.   Bilateral hands with synovitis particularly of the PIPs right much worse than left and right medial MCPs. Limited ROM in the hands and wrist bilaterally.  Right great and second toe amputated, well healed.   Neurological: He is alert and oriented to person, place, and time. No cranial nerve deficit.   Skin: Skin is warm and dry. No pallor.   Left lower extremity wrapped with gauze with yellow drainage seeping through bandage.   Psychiatric: He has a normal mood and affect. His behavior is normal. Thought content normal.         CRANIAL NERVES     CN III, IV, VI   Pupils are equal, round, and reactive to light.  Extraocular motions are normal.         Significant Labs: All pertinent labs within the past 24 hours have been reviewed.    Significant Imaging: I have reviewed and interpreted all pertinent imaging results/findings within the past 24 hours.    Assessment/Plan:      * Left leg cellulitis    LLE concerning for cellulitis. Blood cultures with Strep C which is unlikely to be a contaminant. Wound care consulted. Treated empirically with meropenem and vanc. Vanc stopped as meropenam would cover Strep.        Stage 3 chronic kidney disease    This is likely his new baseline. Monitor.          Hepatosplenomegaly    He was noted to have abnormal LFT and US abdomen showed HSM.  "GI consulted. Negative HCV in 2015. Plan for Dx para on Monday.        Right wrist pain    Acute on chronic right wrist pain. Xrays showed "significant abnormality with resorption or resection involving the majority of the lunate". Ortho recs appreciated.        PVD (peripheral vascular disease)    Given his tobacco history, previous poor wound healing, calcifications seen on Xray, and skin findings, PVD highly suspected. Vascular consulted, recs appreciated.         Essential hypertension    Continue home amlodipine and benazepril. PRN clonidine available.        Tophaceous gout    Continue home allopurinol. Will need follow up as outpatient. He has progressive deformities of his hands. Does not appear to have an acute flare.        Neovascular glaucoma of both eyes    Continue eye drops.        HLD (hyperlipidemia)    Continue Zetia.        DM type 2 with diabetic peripheral neuropathy    Continue basal/bolus insulin. Adjust as needed. He has been seen in endocrine clinic. Continue to follow up with them as outpatient.          VTE Risk Mitigation (From admission, onward)        Ordered     heparin (porcine) injection 5,000 Units  Every 8 hours      10/10/18 1710     IP VTE HIGH RISK PATIENT  Once      10/10/18 1710              Kirsten Trinh MD  Department of Hospital Medicine   [facility]  "

## 2018-10-14 NOTE — SUBJECTIVE & OBJECTIVE
Interval history:  No acute events. No new complaints. Leg continues to have significant oozing soiling bandages.    Review of Systems   Constitutional: Negative for chills and fever.   HENT: Negative for congestion and rhinorrhea.    Eyes: Negative for photophobia and visual disturbance.   Respiratory: Negative for cough and shortness of breath.    Cardiovascular: Positive for leg swelling. Negative for chest pain.   Gastrointestinal: Negative for abdominal pain, constipation, diarrhea and nausea.   Genitourinary: Negative for difficulty urinating and dysuria.   Musculoskeletal: Positive for arthralgias. Negative for gait problem and joint swelling.   Skin: Positive for color change and wound.   Neurological: Negative for dizziness and light-headedness.   Psychiatric/Behavioral: Negative for confusion and dysphoric mood.     Objective:     Vital Signs (Most Recent):  Temp: 98.1 °F (36.7 °C) (10/14/18 1154)  Pulse: 92 (10/14/18 1154)  Resp: 17 (10/14/18 1154)  BP: 125/74 (10/14/18 1154)  SpO2: (!) 93 % (10/14/18 1154) Vital Signs (24h Range):  Temp:  [97.7 °F (36.5 °C)-98.1 °F (36.7 °C)] 98.1 °F (36.7 °C)  Pulse:  [] 92  Resp:  [16-18] 17  SpO2:  [93 %-95 %] 93 %  BP: (125-142)/(68-82) 125/74     Weight: 96.2 kg (212 lb)  Body mass index is 29.57 kg/m².    Physical Exam   Constitutional: He is oriented to person, place, and time. He appears well-developed and well-nourished. No distress.   HENT:   Right Ear: External ear normal.   Left Ear: External ear normal.   Nose: Nose normal.   Mouth/Throat: Oropharynx is clear and moist.   Eyes: Conjunctivae and EOM are normal. Pupils are equal, round, and reactive to light. No scleral icterus.   Neck: Normal range of motion. Neck supple. No thyromegaly present.   Cardiovascular: Normal rate and normal heart sounds. Exam reveals no friction rub.   No murmur heard.  Pulmonary/Chest: Effort normal and breath sounds normal. No respiratory distress. He has no wheezes. He  has no rales.   Abdominal: Soft. Bowel sounds are normal. He exhibits no mass. There is no tenderness.   Obese   Musculoskeletal: He exhibits deformity. He exhibits no edema.   Bilateral hands with synovitis particularly of the PIPs right much worse than left and right medial MCPs. Limited ROM in the hands and wrist bilaterally.  Right great and second toe amputated, well healed.   Neurological: He is alert and oriented to person, place, and time. No cranial nerve deficit.   Skin: Skin is warm and dry. No pallor.   Left lower extremity wrapped with gauze with yellow drainage seeping through bandage.   Psychiatric: He has a normal mood and affect. His behavior is normal. Thought content normal.         CRANIAL NERVES     CN III, IV, VI   Pupils are equal, round, and reactive to light.  Extraocular motions are normal.         Significant Labs: All pertinent labs within the past 24 hours have been reviewed.    Significant Imaging: I have reviewed and interpreted all pertinent imaging results/findings within the past 24 hours.

## 2018-10-14 NOTE — PROGRESS NOTES
Pt seen and examines at bedside this AM.  ARIANA.  Pain controlled.  Interval history:  Hit his right 2nd toe on his bedside tray and dislodged his toenail. He reports having hit it several days again and then again yesterday.        PE:    General WDWN, NAD                 Extremity: right hand mild swelling over dorsal wrist at radiocarpal joint  No cellulitis  No erythema  Mild pain with palpation   strength intact  Right second toe with nail plate off, no undelyung nail bed injury no signs of infectioins     Left leg with open draining areas of cellulitis and erythema with large aprox 7x7cm fluid filled blister on medial plantar aspect of foot seen yesterday now burst.      A/P  58 y.o.male with right wrist AVN and mild DJD likely from a  Previous trauma     Left leg cellulitis with erythema  Right second toe nail plate off, no underlying injury     Continue Abx for left leg. On meropenem now  Conservative tx with NSAID for right wrist  Ice and PT can help as well.  Pt must elevate leg  Will follow ESR/CRP 39/360-  Will follow  Diagnosis paracentesis tomorrow

## 2018-10-14 NOTE — ASSESSMENT & PLAN NOTE
He was noted to have abnormal LFT and US abdomen showed HSM. GI consulted. Negative HCV in 2015. Plan for Dx para on Monday.

## 2018-10-14 NOTE — PROGRESS NOTES
[facility] Hospital Medicine  Progress Note    Patient Name: Marvin Ray  MRN: [id number]  Patient Class: IP- Inpatient   Admission Date: 10/10/2018  Length of Stay: 4 days  Attending Physician: Kirsten Trinh MD  Primary Care Provider: Rubén Davis MD        Subjective:     Principal Problem:Left leg cellulitis    HPI:  Mr. Ray is a pleasant 57 yo man with IDDM (last A1c >14% 7/2018), essential HTN, HLD, gout, former tobacco abuse, and PVD with remote resection of the right 1st and 2nd toe [facility]. He has chronic right hand pain from arthritis but he felt the pain had gotten much worse in the past 2-3 days. He describes a shooting, aching pain, particularly through his thumb, 2nd, and 3rd fingers. He has limited ROM due to arthritis but reports that this is not new. He also has limited wrist flexion and extension.   In the [facility] he was noted to have concerning findings of his left lower extremity. He has chronic woody appearance and hyperpigmentation of the skin on his LLE, but in the past couple of days the skin has been more erythematous. He also notes new edema of the LLE as well as a new blister on the dorsum of his foot. He had decreased pulses though they could be found with doppler.     Hospital Course:  Mr. Ray was admitted due to concern for LLE cellulitis. Per the [place] [facility] physician, she found his exam to be concerning for necrotizing fasciitis, however upon my exam I had very low clinical concern for this. He did appear to have chronic venous insufficiency and likely PVD. Vascular was consulted.  He had chronic hyperpigmentation of his LLE c/w chronic venous stasis with an overlying area of erythema and calor concerning for cellulitis. He was started on empiric antibiotics with meropenam and vancomycin (penicillin allergic). He did have a slightly elevated lactate that remained stable. Blood cultures grew group G  Strep. Antibiotics narrowed to ceftriaxone with final susceptibility results on 10/13/18.  He was found to have slightly elevated LFTs. Abdominal US with hepatosplenomegaly, ascites, and a right pleural effusion. GI consulted. Plan for Dx paracentesis on 10/15/18.  His renal function was also slightly declined from his previous baseline. IV fluid challenge with no improvement. Fluids stopped as pleural effusion noted. This is likely his new baseline. Will need nephrology follow up as outpatient.    Interval history:  No acute events. No new complaints. Leg continues to have significant oozing soiling bandages.    Review of Systems   Constitutional: Negative for chills and fever.   HENT: Negative for congestion and rhinorrhea.    Eyes: Negative for photophobia and visual disturbance.   Respiratory: Negative for cough and shortness of breath.    Cardiovascular: Positive for leg swelling. Negative for chest pain.   Gastrointestinal: Negative for abdominal pain, constipation, diarrhea and nausea.   Genitourinary: Negative for difficulty urinating and dysuria.   Musculoskeletal: Positive for arthralgias. Negative for gait problem and joint swelling.   Skin: Positive for color change and wound.   Neurological: Negative for dizziness and light-headedness.   Psychiatric/Behavioral: Negative for confusion and dysphoric mood.     Objective:     Vital Signs (Most Recent):  Temp: 98.1 °F (36.7 °C) (10/14/18 1154)  Pulse: 92 (10/14/18 1154)  Resp: 17 (10/14/18 1154)  BP: 125/74 (10/14/18 1154)  SpO2: (!) 93 % (10/14/18 1154) Vital Signs (24h Range):  Temp:  [97.7 °F (36.5 °C)-98.1 °F (36.7 °C)] 98.1 °F (36.7 °C)  Pulse:  [] 92  Resp:  [16-18] 17  SpO2:  [93 %-95 %] 93 %  BP: (125-142)/(68-82) 125/74     Weight: 96.2 kg (212 lb)  Body mass index is 29.57 kg/m².    Physical Exam   Constitutional: He is oriented to person, place, and time. He appears well-developed and well-nourished. No distress.   HENT:   Right Ear:  External ear normal.   Left Ear: External ear normal.   Nose: Nose normal.   Mouth/Throat: Oropharynx is clear and moist.   Eyes: Conjunctivae and EOM are normal. Pupils are equal, round, and reactive to light. No scleral icterus.   Neck: Normal range of motion. Neck supple. No thyromegaly present.   Cardiovascular: Normal rate and normal heart sounds. Exam reveals no friction rub.   No murmur heard.  Pulmonary/Chest: Effort normal and breath sounds normal. No respiratory distress. He has no wheezes. He has no rales.   Abdominal: Soft. Bowel sounds are normal. He exhibits no mass. There is no tenderness.   Obese   Musculoskeletal: He exhibits deformity. He exhibits no edema.   Bilateral hands with synovitis particularly of the PIPs right much worse than left and right medial MCPs. Limited ROM in the hands and wrist bilaterally.  Right great and second toe amputated, well healed.   Neurological: He is alert and oriented to person, place, and time. No cranial nerve deficit.   Skin: Skin is warm and dry. No pallor.   Left lower extremity wrapped with gauze with yellow drainage seeping through bandage.   Psychiatric: He has a normal mood and affect. His behavior is normal. Thought content normal.         CRANIAL NERVES     CN III, IV, VI   Pupils are equal, round, and reactive to light.  Extraocular motions are normal.         Significant Labs: All pertinent labs within the past 24 hours have been reviewed.    Significant Imaging: I have reviewed and interpreted all pertinent imaging results/findings within the past 24 hours.    Assessment/Plan:      * Left leg cellulitis    LLE concerning for cellulitis. Blood cultures with Strep C which is unlikely to be a contaminant. Wound care consulted. Treated empirically with meropenem and vanc. Vanc stopped as meropenam would cover Strep.        Stage 3 chronic kidney disease    This is likely his new baseline. Monitor.          Hepatosplenomegaly    He was noted to have  "abnormal LFT and US abdomen showed HSM. GI consulted. Negative HCV in 2015. Plan for Dx para on Monday.        Right wrist pain    Acute on chronic right wrist pain. Xrays showed "significant abnormality with resorption or resection involving the majority of the lunate". Ortho recs appreciated.        PVD (peripheral vascular disease)    Given his tobacco history, previous poor wound healing, calcifications seen on Xray, and skin findings, PVD highly suspected. Vascular consulted, recs appreciated.         Essential hypertension    Continue home amlodipine and benazepril. PRN clonidine available.        Tophaceous gout    Continue home allopurinol. Will need follow up as outpatient. He has progressive deformities of his hands. Does not appear to have an acute flare.        Neovascular glaucoma of both eyes    Continue eye drops.        HLD (hyperlipidemia)    Continue Zetia.        DM type 2 with diabetic peripheral neuropathy    Continue basal/bolus insulin. Adjust as needed. He has been seen in endocrine clinic. Continue to follow up with them as outpatient.          VTE Risk Mitigation (From admission, onward)        Ordered     heparin (porcine) injection 5,000 Units  Every 8 hours      10/10/18 1710     IP VTE HIGH RISK PATIENT  Once      10/10/18 1710              Kirsten Trinh MD  Department of Hospital Medicine   [facility]  "

## 2018-10-14 NOTE — PROGRESS NOTES
Progress Note    Admit Date: 10/10/2018   LOS: 4 days     SUBJECTIVE:     Chief Complaint:  Pain in right hand    HPI: Patient still has pain in right hand.  No abdominal pain or have nausea or vomiting.    Scheduled Meds:   allopurinol  200 mg Oral Daily    amLODIPine  5 mg Oral Daily    aspirin  81 mg Oral Daily    benazepril  40 mg Oral Daily    brimonidine 0.1%  1 drop Both Eyes TID    cefTRIAXone (ROCEPHIN) IVPB  2 g Intravenous Q24H    dorzolamide-timolol 2-0.5%  1 drop Both Eyes TID    ezetimibe  10 mg Oral Daily    heparin (porcine)  5,000 Units Subcutaneous Q8H    insulin detemir U-100  20 Units Subcutaneous QHS    ketorolac 0.5%  1 drop Right Eye TID    multivit-iron-FA-calcium-mins  1 tablet Oral QAM    prednisoLONE acetate  1 drop Right Eye TID     Continuous Infusions:  PRN Meds:cloNIDine, dextrose 50%, dextrose 50%, glucagon (human recombinant), glucose, glucose, influenza, insulin aspart U-100    Review of patient's allergies indicates:   Allergen Reactions    Statins-hmg-coa reductase inhibitors      Generalized Pain    Onglyza [saxagliptin]     Penicillins Rash       Review of Systems  Constitutional- No fever or chills  Neuro- No change in mental status  CV- No Chest Pain or Palpitations  Resp- No Cough or SOB  GI- abdominal distension.  Extrem- swelling of both legs and pain in right hand.    OBJECTIVE:     Vital Signs (Most Recent)  Temp: 97.7 °F (36.5 °C) (10/14/18 0813)  Pulse: 92 (10/14/18 0813)  Resp: 16 (10/14/18 0813)  BP: 138/82 (10/14/18 0813)  SpO2: (!) 94 % (10/14/18 0813)    Vital Signs Range (Last 24H):  Temp:  [97.4 °F (36.3 °C)-97.9 °F (36.6 °C)]   Pulse:  []   Resp:  [16-18]   BP: (122-142)/(68-82)   SpO2:  [93 %-95 %]     I & O (Last 24H):    Intake/Output Summary (Last 24 hours) at 10/14/2018 0945  Last data filed at 10/14/2018 0850  Gross per 24 hour   Intake 410 ml   Output --   Net 410 ml       Physical Exam:  General- Patient alert and oriented x3 in  NAD  HEENT- PERRLA, EOMI, OP clear, MMM  Neck- No JVD, Lymphadenopathy, Thyromegaly  CV- Regular rate and rhythm, No Murmur/rafat/rubs  Resp- Lungs CTA Bilaterally, No increased WOB  Abdomen- distended  Extrem- edema of both legs. Amputation of two toes on right  Skin- No rashes, lesions, ulcers    Laboratory:  CBC:   Recent Labs   Lab  10/14/18   0459   WBC  12.11   RBC  4.82   HGB  15.0   HCT  42.4   PLT  187   MCV  88   MCH  31.1*   MCHC  35.4     CMP:   Recent Labs   Lab  10/14/18   0459   GLU  111*   CALCIUM  9.2   ALBUMIN  2.5*   PROT  6.5   NA  135*   K  4.6   CO2  21*   CL  103   BUN  50*   CREATININE  0.9   ALKPHOS  218*   ALT  95*   AST  76*   BILITOT  2.7*     LFTs:   Recent Labs   Lab  10/14/18   0459   ALT  95*   AST  76*   ALKPHOS  218*   BILITOT  2.7*   PROT  6.5   ALBUMIN  2.5*     Coagulation: No results for input(s): PT, INR, APTT in the last 168 hours.    Diagnostic Results:  Labs: Reviewed  LFTs abnormal.  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  Await hepatitis and iron screen for evaluation of hepatosplenomegaly.

## 2018-10-15 LAB
ALBUMIN FLD-MCNC: 1.6 G/DL
ALBUMIN SERPL BCP-MCNC: 2.5 G/DL
ALP SERPL-CCNC: 228 U/L
ALT SERPL W/O P-5'-P-CCNC: 78 U/L
ANION GAP SERPL CALC-SCNC: 12 MMOL/L
AST SERPL-CCNC: 62 U/L
BACTERIA BLD CULT: NORMAL
BILIRUB SERPL-MCNC: 3.1 MG/DL
BUN SERPL-MCNC: 48 MG/DL
CALCIUM SERPL-MCNC: 9 MG/DL
CERULOPLASMIN SERPL-MCNC: 41 MG/DL
CHLORIDE SERPL-SCNC: 101 MMOL/L
CO2 SERPL-SCNC: 22 MMOL/L
CREAT SERPL-MCNC: 0.9 MG/DL
EST. GFR  (AFRICAN AMERICAN): >60 ML/MIN/1.73 M^2
EST. GFR  (NON AFRICAN AMERICAN): >60 ML/MIN/1.73 M^2
GLUCOSE SERPL-MCNC: 80 MG/DL
HBV SURFACE AG SERPL QL IA: NEGATIVE
HCV AB SERPL QL IA: NEGATIVE
IGG SERPL-MCNC: 1144 MG/DL
POCT GLUCOSE: 140 MG/DL (ref 70–110)
POCT GLUCOSE: 167 MG/DL (ref 70–110)
POCT GLUCOSE: 222 MG/DL (ref 70–110)
POCT GLUCOSE: 70 MG/DL (ref 70–110)
POTASSIUM SERPL-SCNC: 4.1 MMOL/L
PROT FLD-MCNC: 2.9 G/DL
PROT SERPL-MCNC: 6.7 G/DL
SODIUM SERPL-SCNC: 135 MMOL/L
SPECIMEN SOURCE: NORMAL
SPECIMEN SOURCE: NORMAL

## 2018-10-15 PROCEDURE — 97110 THERAPEUTIC EXERCISES: CPT

## 2018-10-15 PROCEDURE — 82390 ASSAY OF CERULOPLASMIN: CPT

## 2018-10-15 PROCEDURE — 63600175 PHARM REV CODE 636 W HCPCS: Performed by: INTERNAL MEDICINE

## 2018-10-15 PROCEDURE — 87075 CULTR BACTERIA EXCEPT BLOOD: CPT

## 2018-10-15 PROCEDURE — 36415 COLL VENOUS BLD VENIPUNCTURE: CPT

## 2018-10-15 PROCEDURE — 25000003 PHARM REV CODE 250: Performed by: INTERNAL MEDICINE

## 2018-10-15 PROCEDURE — 82784 ASSAY IGA/IGD/IGG/IGM EACH: CPT

## 2018-10-15 PROCEDURE — 82042 OTHER SOURCE ALBUMIN QUAN EA: CPT

## 2018-10-15 PROCEDURE — 82103 ALPHA-1-ANTITRYPSIN TOTAL: CPT

## 2018-10-15 PROCEDURE — 0W9G3ZX DRAINAGE OF PERITONEAL CAVITY, PERCUTANEOUS APPROACH, DIAGNOSTIC: ICD-10-PCS | Performed by: RADIOLOGY

## 2018-10-15 PROCEDURE — 86376 MICROSOMAL ANTIBODY EACH: CPT

## 2018-10-15 PROCEDURE — 84157 ASSAY OF PROTEIN OTHER: CPT

## 2018-10-15 PROCEDURE — 11000001 HC ACUTE MED/SURG PRIVATE ROOM

## 2018-10-15 PROCEDURE — 80053 COMPREHEN METABOLIC PANEL: CPT

## 2018-10-15 PROCEDURE — 63600175 PHARM REV CODE 636 W HCPCS: Performed by: EMERGENCY MEDICINE

## 2018-10-15 PROCEDURE — 86256 FLUORESCENT ANTIBODY TITER: CPT

## 2018-10-15 PROCEDURE — 87070 CULTURE OTHR SPECIMN AEROBIC: CPT

## 2018-10-15 RX ORDER — FUROSEMIDE 40 MG/1
40 TABLET ORAL DAILY
Status: DISCONTINUED | OUTPATIENT
Start: 2018-10-15 | End: 2018-10-17 | Stop reason: HOSPADM

## 2018-10-15 RX ORDER — SPIRONOLACTONE 100 MG/1
100 TABLET, FILM COATED ORAL DAILY
Qty: 30 TABLET | Refills: 11 | Status: ON HOLD | OUTPATIENT
Start: 2018-10-15 | End: 2018-11-21 | Stop reason: HOSPADM

## 2018-10-15 RX ORDER — CEPHALEXIN 500 MG/1
500 CAPSULE ORAL EVERY 8 HOURS
Qty: 15 CAPSULE | Refills: 0 | Status: SHIPPED | OUTPATIENT
Start: 2018-10-15 | End: 2018-10-20

## 2018-10-15 RX ORDER — SPIRONOLACTONE 100 MG/1
100 TABLET, FILM COATED ORAL DAILY
Status: DISCONTINUED | OUTPATIENT
Start: 2018-10-15 | End: 2018-10-17 | Stop reason: HOSPADM

## 2018-10-15 RX ADMIN — PREDNISOLONE ACETATE 1 DROP: 10 SUSPENSION/ DROPS OPHTHALMIC at 09:10

## 2018-10-15 RX ADMIN — HEPARIN SODIUM 5000 UNITS: 5000 INJECTION, SOLUTION INTRAVENOUS; SUBCUTANEOUS at 04:10

## 2018-10-15 RX ADMIN — BENAZEPRIL HYDROCHLORIDE 40 MG: 40 TABLET, COATED ORAL at 09:10

## 2018-10-15 RX ADMIN — BRIMONIDINE TARTRATE 1 DROP: 1 SOLUTION/ DROPS OPHTHALMIC at 04:10

## 2018-10-15 RX ADMIN — ASPIRIN 81 MG 81 MG: 81 TABLET ORAL at 09:10

## 2018-10-15 RX ADMIN — ALLOPURINOL 200 MG: 100 TABLET ORAL at 09:10

## 2018-10-15 RX ADMIN — FUROSEMIDE 40 MG: 40 TABLET ORAL at 09:10

## 2018-10-15 RX ADMIN — AMLODIPINE BESYLATE 5 MG: 5 TABLET ORAL at 09:10

## 2018-10-15 RX ADMIN — KETOROLAC TROMETHAMINE 1 DROP: 5 SOLUTION/ DROPS OPHTHALMIC at 04:10

## 2018-10-15 RX ADMIN — BRIMONIDINE TARTRATE 1 DROP: 1 SOLUTION/ DROPS OPHTHALMIC at 09:10

## 2018-10-15 RX ADMIN — EZETIMIBE 10 MG: 10 TABLET ORAL at 09:10

## 2018-10-15 RX ADMIN — DORZOLAMIDE HYDROCHLORIDE AND TIMOLOL MALEATE 1 DROP: 20; 5 SOLUTION/ DROPS OPHTHALMIC at 09:10

## 2018-10-15 RX ADMIN — KETOROLAC TROMETHAMINE 1 DROP: 5 SOLUTION/ DROPS OPHTHALMIC at 09:10

## 2018-10-15 RX ADMIN — MULTIPLE VITAMINS W/ MINERALS TAB 1 TABLET: TAB at 06:10

## 2018-10-15 RX ADMIN — PREDNISOLONE ACETATE 1 DROP: 10 SUSPENSION/ DROPS OPHTHALMIC at 04:10

## 2018-10-15 RX ADMIN — INSULIN DETEMIR 20 UNITS: 100 INJECTION, SOLUTION SUBCUTANEOUS at 09:10

## 2018-10-15 RX ADMIN — CEFTRIAXONE 2 G: 2 INJECTION, SOLUTION INTRAVENOUS at 04:10

## 2018-10-15 RX ADMIN — ACETAMINOPHEN 650 MG: 325 TABLET ORAL at 07:10

## 2018-10-15 RX ADMIN — DORZOLAMIDE HYDROCHLORIDE AND TIMOLOL MALEATE 1 DROP: 20; 5 SOLUTION/ DROPS OPHTHALMIC at 04:10

## 2018-10-15 RX ADMIN — SPIRONOLACTONE 100 MG: 100 TABLET, FILM COATED ORAL at 09:10

## 2018-10-15 RX ADMIN — INSULIN ASPART 2 UNITS: 100 INJECTION, SOLUTION INTRAVENOUS; SUBCUTANEOUS at 09:10

## 2018-10-15 RX ADMIN — HEPARIN SODIUM 5000 UNITS: 5000 INJECTION, SOLUTION INTRAVENOUS; SUBCUTANEOUS at 06:10

## 2018-10-15 RX ADMIN — HEPARIN SODIUM 5000 UNITS: 5000 INJECTION, SOLUTION INTRAVENOUS; SUBCUTANEOUS at 09:10

## 2018-10-15 NOTE — PLAN OF CARE
Problem: Patient Care Overview  Goal: Plan of Care Review  Plan to go to IR for Paracentesis on today.Cayla

## 2018-10-15 NOTE — PROGRESS NOTES
"Patient reports being discharged home. Nursing states Dr. Chan changed dressing today. Nursing reports over the weekend, the plantar foot blister extended to the medial foot and broke.   Assessment:  Photodocumentation    Left plantar/medial foot- Broken blister with serous fluid accumulating under  epidermis. Base of blister purple in color. Intact blister at medial heel. Unable to palpate DP pulse.     Left plantar foot and 5th toe- 5th toe purple in color with purple discoloration dorsal foot. Broken blister 5 cm on dorsal foot.     Left lower leg- Less edema and less blistering. Improved since 10/12.   Treatment:  Cleansed foot and leg with NS. Placed jacqui of Aquacel Extra between toes. Covered draining areas with Aquacel Extra and covered with dry gauze. Wrapped with Kerlix roll and applied 4" Coban for light compression. Discussed wound management with patient and daughter at bedside.   Discussed status of wounds with Dr. Estuardo Taylor.         "

## 2018-10-15 NOTE — PLAN OF CARE
"   10/15/18 1155   Final Note   Assessment Type Final Discharge Note   Discharge Disposition Home   What phone number can be called within the next 1-3 days to see how you are doing after discharge? [phone]   Hospital Follow Up  Appt(s) scheduled? Yes   Discharge plans and expectations educations in teach back method with documentation complete? Yes   Right Care Referral Info   Post Acute Recommendation No Care     EDUCATION:  Pt provided with educational information on " Post Op Surgery/ Wound Care ".  Information reviewed and placed in :My Healthcare Packet" to be brought home for pt to use as resource after discharge.  Information included:  signs and symptoms to look for and call the doctor if experiencing, and symptoms that may indicate a medical emergency: CALL 911.      All questions answered.  Teach back method used.  Pt  verbalized understanding of all information by stating, "  That he is aware of the signs and symptoms to watch for and when to contact MD and when to report to the ED immediately. Also SW took this time to ask pt to provide at least 3 symptoms. Pt stated Redness, Legs in Pain, Chest Pain, or SOB.     "

## 2018-10-15 NOTE — PT/OT/SLP PROGRESS
Occupational Therapy      Patient Name:  Marvin Ray   MRN:  [id number]    Patient not seen today secondary to Unavailable (Comment)(off the unit for procedure). Will follow-up as able.    Celina Cai OT  10/15/2018

## 2018-10-15 NOTE — CONSULTS
Inpatient Radiology Pre-procedure Note    History of Present Illness:  Marvin Ray is a 58 y.o. male who presents for ascites and paracentesis.  Admission H&P reviewed.  Past Medical History:   Diagnosis Date    Arthritis     Diabetes mellitus     Diabetic retinopathy     Glaucoma     Gout     Hyperlipemia     Hypertension      Past Surgical History:   Procedure Laterality Date    AHMED GLAUCOMA IMPLANT Left 2011    DONE AT [facility]    BAERVELDT GLAUCOMA IMPLANT Left 2012    WITH CATARACT EXTRACTION//DONE AT [facility]    CATARACT EXTRACTION W/  INTRAOCULAR LENS IMPLANT Left 2012    WITH BAERVEDT//DONE AT [facility]    CATARACT EXTRACTION W/  INTRAOCULAR LENS IMPLANT Right 09/26/2018    COMPLEX ()    CB DESTRUCTION WITH CYCLO G6 Left 02/15/2017        CYST REMOVAL      HEART CATH-LEFT N/A 5/6/2016    Performed by Mike Magana MD at [facility] CATH LAB    INSERTION, IOL PROSTHESIS Right 9/26/2018    Performed by Perla Cortés MD at [facility] OR [address] [place] [address]    INTRAOCULAR PROSTHESES INSERTION Right 9/26/2018    Procedure: INSERTION, IOL PROSTHESIS;  Surgeon: Perla Cortés MD;  Location: [facility] OR [address] [place] [address];  Service: Ophthalmology;  Laterality: Right;    PHACOEMULSIFICATION OF CATARACT Right 9/26/2018    Procedure: PHACOEMULSIFICATION, CATARACT;  Surgeon: Perla Cortés MD;  Location: [facility] OR [address] [place] [address];  Service: Ophthalmology;  Laterality: Right;    PHACOEMULSIFICATION, CATARACT Right 9/26/2018    Performed by Perla Cortés MD at [facility] OR [address] [place] [address]    Right foot surgery  10/2014    TOE AMPUTATION      TONSILLECTOMY      TRABECULECTOMY/G 6 LASER Left 2/15/2017    Performed by Perla Cortés MD at [facility] OR [address] [place] [address]       Review of Systems:   As documented in primary team H&P    Home Meds:   Prior to Admission medications    Medication Sig Start Date End Date Taking? Authorizing Provider   allopurinol (ZYLOPRIM) 100 MG tablet Take 2 tablets (200 mg total) by  "mouth once daily. 9/19/17   Mike Bass MD   amLODIPine (NORVASC) 5 MG tablet TAKE 1 TABLET(5 MG) BY MOUTH EVERY DAY 10/8/18   Rubén Davis MD   aspirin 81 MG Chew Take 81 mg by mouth once daily.    Historical Provider, MD   benazepril (LOTENSIN) 40 MG tablet TAKE 1 TABLET(40 MG) BY MOUTH TWICE DAILY 1/19/18   Mike Bass MD   brimonidine 0.1% (ALPHAGAN P) 0.1 % Drop Place 1 drop into both eyes 3 (three) times daily. 3/6/18   Perla Cortés MD   coenzyme Q10 100 mg capsule Take 100 mg by mouth every morning.    Historical Provider, MD   dorzolamide-timolol 2-0.5% (COSOPT) 22.3-6.8 mg/mL ophthalmic solution Place 1 drop into both eyes 3 (three) times daily. 3/6/18   Perla Cortés MD   ezetimibe (ZETIA) 10 mg tablet Take 1 tablet (10 mg total) by mouth once daily. 9/7/18 9/7/19  Sheryl Madison NP   fish oil-omega-3 fatty acids 300-1,000 mg capsule Take 2 capsules (2 g total) by mouth 2 (two) times daily.  Patient taking differently: Take 1 g by mouth 2 (two) times daily.  10/22/14   Mike Bass MD   insulin aspart U-100 (NOVOLOG) 100 unit/mL InPn pen Inject 12 Units into the skin 3 (three) times daily with meals. 7/27/18 7/27/19  Rubén Davis MD   insulin glargine-lixisenatide (SOLIQUA 100/33) 100 unit-33 mcg/mL InPn Inject 34 units once daily 10/8/18   Sheryl Madison NP   ketorolac 0.5% (ACULAR) 0.5 % Drop Place 1 drop into the right eye 3 (three) times daily. 10/4/18   Perla Cortés MD   MULTIVIT,THER IRON,CA,FA & MIN (MULTIVITAMIN) Tab Take 1 tablet by mouth every morning.     Historical Provider, MD   nitroGLYCERIN (NITROSTAT) 0.4 MG SL tablet Place 1 tablet (0.4 mg total) under the tongue every 5 (five) minutes as needed for Chest pain (If CP persists go to ER. If taking NTG notify MD.). 5/8/16 11/20/17  EVELYN Silvestre   pen needle, diabetic 31 gauge x 1/4" Ndle Use as directed 8/11/16   Mike Bass MD   prednisoLONE acetate (PRED FORTE) 1 % DrpS Place 1 drop " into the right eye 3 (three) times daily. 10/4/18   Perla Cortés MD     Scheduled Meds:    allopurinol  200 mg Oral Daily    amLODIPine  5 mg Oral Daily    aspirin  81 mg Oral Daily    benazepril  40 mg Oral Daily    brimonidine 0.1%  1 drop Both Eyes TID    cefTRIAXone (ROCEPHIN) IVPB  2 g Intravenous Q24H    dorzolamide-timolol 2-0.5%  1 drop Both Eyes TID    ezetimibe  10 mg Oral Daily    furosemide  40 mg Oral Daily    heparin (porcine)  5,000 Units Subcutaneous Q8H    insulin detemir U-100  20 Units Subcutaneous QHS    ketorolac 0.5%  1 drop Right Eye TID    multivit-iron-FA-calcium-mins  1 tablet Oral QAM    prednisoLONE acetate  1 drop Right Eye TID    spironolactone  100 mg Oral Daily     Continuous Infusions:   PRN Meds:acetaminophen, cloNIDine, dextrose 50%, dextrose 50%, glucagon (human recombinant), glucose, glucose, influenza, insulin aspart U-100  Anticoagulants/Antiplatelets: no anticoagulation    Allergies:   Review of patient's allergies indicates:   Allergen Reactions    Statins-hmg-coa reductase inhibitors      Generalized Pain    Onglyza [saxagliptin]     Penicillins Rash     Sedation Hx: have not been any systemic reactions    Labs:  Recent Labs   Lab  10/14/18   1059   INR  1.2       Recent Labs   Lab  10/14/18   0459   WBC  12.11   HGB  15.0   HCT  42.4   MCV  88   PLT  187      Recent Labs   Lab  10/15/18   0528   GLU  80   NA  135*   K  4.1   CL  101   CO2  22*   BUN  48*   CREATININE  0.9   CALCIUM  9.0   ALT  78*   AST  62*   ALBUMIN  2.5*   BILITOT  3.1*         Vitals:  Temp: 97.6 °F (36.4 °C) (10/15/18 0727)  Pulse: 76 (10/15/18 0727)  Resp: 17 (10/15/18 0727)  BP: 126/75 (10/15/18 0727)  SpO2: (!) 94 % (10/15/18 0727)     Physical Exam:  ASA: 2  Mallampati: 2    General: no acute distress  Mental Status: alert and oriented to person, place and time  HEENT: normocephalic, atraumatic  Chest: unlabored breathing  Heart: regular heart rate  Abdomen:  nondistended  Extremity: moves all extremities    Plan: Paracentesis  Sedation Plan: local    .ISimon M.D. personally reviewed and agree with the above dictated note.

## 2018-10-15 NOTE — PROGRESS NOTES
WRITTEN HEALTHCARE DISCHARGE INFORMATION     Things that YOU are RESPONSIBLE for to Manage Your Care At Home:    1. Getting your prescriptions filled.  2. Taking you medications as directed. DO NOT MISS ANY DOSES!  3. Going to your follow-up doctor appointments. This is important because it allows the doctor to monitor your progress and to determine if any changes need to be made to your treatment plan.    If you are unable to make your follow up appointments, please call the number listed and reschedule this appointment.     ____________HELP AT HOME____________________    Experiencing any SIGNS or SYMPTOMS: YOU CAN    Schedule a same day appopintment with your Primary Care Doctor or  you can call [facility] On Call Nurse Care Line for 24/7 assistance at [phone]    If you are experience any signs or symptoms that have become severe, Call 911 and come to your nearest Emergency Room.    Thank you for choosing [facility] and allowing us to care for you.   From your care management team: KOBY Centeno, [facility] [phone]     You should receive a call from [facility] within 48-72 hours to help manage your care after discharge. Please try to make sure that you answer your phone for this important phone call.     Follow-up Information     Anastacia Don III, MD On 10/17/2018.    Specialty:  Orthopedic Surgery  Why:  Wednesday at 1pm. Bone and Joint appointment scheduled.   Contact information:  [address]  [facility]  [place] [address]  [phone]             Rubén Davis MD On 10/18/2018.    Specialty:  Family Medicine  Why:  Thursday at 11am. Primary Care Appointment. [facility].   Contact information:  [address] [facility]  [place] [address]  [phone]             [facility].    Specialty:  Wound Care  Why:  On the first floor across from Registration. They will call you with your appointment.   Contact information:  [address] [place]  [address]  [phone]

## 2018-10-15 NOTE — PLAN OF CARE
Problem: Physical Therapy Goal  Goal: Physical Therapy Goal  Goals to be met by: 10/27/18     Patient will increase functional independence with mobility by performin. Supine to sit with Modified Bonner  2. Sit to stand transfer with Modified Bonner  3. Gait  x 150 feet with Contact Guard Assistance using the least restrictive device from platform walker -> cane.   4. Lower extremity exercise program x 20-30 reps per handout, with supervision     Outcome: Ongoing (interventions implemented as appropriate)  PT for functional mob training and ex; continue PT as planned

## 2018-10-15 NOTE — PLAN OF CARE
Problem: Patient Care Overview  Goal: Plan of Care Review  Outcome: Ongoing (interventions implemented as appropriate)  Pt A/O no complaint of pain no s/s of distress; pt ambulate to restroom, free from fall/injury; IV saline lock; cardiac monitor #[id number]; accu checks monitored; dressing intact of left foot; family was at bedside; call light w/in reach bed locked in lowest position side up x2 will cont to monitor

## 2018-10-15 NOTE — PT/OT/SLP PROGRESS
Physical Therapy Treatment    Patient Name:  Marvin Ray   MRN:  [id number]    Recommendations:     Discharge Recommendations:  home health PT   Discharge Equipment Recommendations: walker, rolling   Barriers to discharge: pain to (L) LE that limits mobility    Assessment:     Marvin Ray is a 58 y.o. male admitted with a medical diagnosis of Left leg cellulitis.  He presents with the following impairments/functional limitations:  impaired functional mobilty, pain to (L)>(R)LE, impaired cardiopulmonary response to activity, impaired endurance, gait instability, impaired balance, impaired muscle length, impaired skin, impaired self care skills, decreased lower extremity function.    Rehab Prognosis:  fair; patient would benefit from acute skilled PT services to address these deficits and reach maximum level of function.      Recent Surgery: * No surgery found *      Plan:     During this hospitalization, patient to be seen 6 x/week to address the above listed problems via gait training, therapeutic activities, therapeutic exercises, neuromuscular re-education  · Plan of Care Expires:  10/27/18   Plan of Care Reviewed with: patient    Subjective     Communicated with JILLIAN Braga prior to session.  Patient found supine in bed with head and feet slightly elevated upon PT entry to room, agreeable to treatment.      Chief Complaint: pain to (L) LE  Patient comments/goals: agreed for ex only due to pain to legs and states he just got back to bed and is too tired to get up, despite encouragements by PT  Pain/Comfort:  · Pain Rating 1: 8/10(plantar aspect of (L) foot )  · Location - Side 1: Left  · Location - Orientation 1: generalized  · Location 1: foot  · Pain Addressed 1: Reposition, Cessation of Activity, Nurse notified  · Pain Rating Post-Intervention 1: (pt did not rate)    Patients cultural, spiritual, Hoahaoism conflicts given the current situation:      Objective:     Patient found with: (dressing to  (B) feet)     General Precautions: Standard, fall   Orthopedic Precautions:    Braces: N/A     Functional Mobility:  · Bed Mobility:     · Rolling Left:  modified independence  · Rolling Right: modified independence  · Transfers:  NT as pt refused due to pain and fatigue  · Gait: NT as pt refused due to pain and fatigue    AM-PAC 6 CLICK MOBILITY  Turning over in bed (including adjusting bedclothes, sheets and blankets)?: 4  Sitting down on and standing up from a chair with arms (e.g., wheelchair, bedside commode, etc.): 4  Moving from lying on back to sitting on the side of the bed?: 3  Moving to and from a bed to a chair (including a wheelchair)?: 3  Need to walk in hospital room?: 3  Climbing 3-5 steps with a railing?: 3  Basic Mobility Total Score: 20       Therapeutic Activities and Exercises:   bed mob training and Thera ex to (B), major planes as well as gluteal, adductor and quads sets, 2 sec hold, all ex done 10 reps, and pt refused to do more at this time due to fatigue.    Patient left supine with all lines intact, call button in reach and RN notified..    GOALS:   Multidisciplinary Problems     Physical Therapy Goals        Problem: Physical Therapy Goal    Goal Priority Disciplines Outcome Goal Variances Interventions   Physical Therapy Goal     PT, PT/OT Ongoing (interventions implemented as appropriate)     Description:  Goals to be met by: 10/27/18     Patient will increase functional independence with mobility by performin. Supine to sit with Modified [place]  2. Sit to stand transfer with Modified [place]  3. Gait  x 150 feet with Contact Guard Assistance using the least restrictive device from platform walker -> cane.   4. Lower extremity exercise program x 20-30 reps per handout, with supervision                      Time Tracking:     PT Received On: 10/15/18  PT Start Time: 1350     PT Stop Time: 1404  PT Total Time (min): 14 min     Billable Minutes: Therapeutic Exercise  10    Treatment Type: Treatment  PT/PTA: PT           Valentine Melchor, PT  10/15/2018

## 2018-10-15 NOTE — PROCEDURES
Radiology Post Procedure Note:     Procedure: IR Paracentesis    (s): Mian    Blood Loss: Minimal    Specimen: 3 cc clear yellow fluid    Findings:   Patient came to IR and under imaging guidance had abdomen evaluated which did not demonstrate significant fluid. Next, 21 G Needle advanced and 3 cc clear yellow fluid aspirated.     Simon HERCULES M.D. personally reviewed and agree with the above dictated note.

## 2018-10-15 NOTE — PROGRESS NOTES
The patient denies having any new issues today.    Vitals:    10/14/18 1905 10/14/18 1947 10/15/18 0017 10/15/18 0614   BP:  113/63 98/62 126/75   BP Location:  Right arm Left arm Right arm   Patient Position:  Lying Lying Lying   Pulse: 94 98 83 74   Resp:  18 18 18   Temp:  97.8 °F (36.6 °C) 97.8 °F (36.6 °C) 98.5 °F (36.9 °C)   TempSrc:  Oral Axillary Axillary   SpO2:  (!) 92% 96% 96%   Weight:       Height:          allopurinol  200 mg Oral Daily    amLODIPine  5 mg Oral Daily    aspirin  81 mg Oral Daily    benazepril  40 mg Oral Daily    brimonidine 0.1%  1 drop Both Eyes TID    cefTRIAXone (ROCEPHIN) IVPB  2 g Intravenous Q24H    dorzolamide-timolol 2-0.5%  1 drop Both Eyes TID    ezetimibe  10 mg Oral Daily    furosemide  40 mg Oral Daily    heparin (porcine)  5,000 Units Subcutaneous Q8H    insulin detemir U-100  20 Units Subcutaneous QHS    ketorolac 0.5%  1 drop Right Eye TID    multivit-iron-FA-calcium-mins  1 tablet Oral QAM    prednisoLONE acetate  1 drop Right Eye TID    spironolactone  100 mg Oral Daily     P.E.:  GEN: A x O x 3, NAD  HEENT: EOMI, PERRL, anicteric sclera  CV: RRR, no M/R/G  Chest: CTA B  Abdomen: soft, NTND, normoactive BS  Ext: No C/C/E. 2+ dorsalis pedis pulses B    Labs:    Recent Results (from the past 336 hour(s))   CBC auto differential    Collection Time: 10/14/18  4:59 AM   Result Value Ref Range    WBC 12.11 3.90 - 12.70 K/uL    Hemoglobin 15.0 14.0 - 18.0 g/dL    Hematocrit 42.4 40.0 - 54.0 %    Platelets 187 150 - 350 K/uL   CBC auto differential    Collection Time: 10/13/18  5:11 AM   Result Value Ref Range    WBC 12.88 (H) 3.90 - 12.70 K/uL    Hemoglobin 13.7 (L) 14.0 - 18.0 g/dL    Hematocrit 39.1 (L) 40.0 - 54.0 %    Platelets 194 150 - 350 K/uL   CBC auto differential    Collection Time: 10/12/18  5:13 AM   Result Value Ref Range    WBC 12.24 3.90 - 12.70 K/uL    Hemoglobin 13.3 (L) 14.0 - 18.0 g/dL    Hematocrit 38.9 (L) 40.0 - 54.0 %    Platelets 179  150 - 350 K/uL     CMP  Sodium   Date Value Ref Range Status   10/15/2018 135 (L) 136 - 145 mmol/L Final     Potassium   Date Value Ref Range Status   10/15/2018 4.1 3.5 - 5.1 mmol/L Final     Chloride   Date Value Ref Range Status   10/15/2018 101 95 - 110 mmol/L Final     CO2   Date Value Ref Range Status   10/15/2018 22 (L) 23 - 29 mmol/L Final     Glucose   Date Value Ref Range Status   10/15/2018 80 70 - 110 mg/dL Final     BUN, Bld   Date Value Ref Range Status   10/15/2018 48 (H) 6 - 20 mg/dL Final     Creatinine   Date Value Ref Range Status   10/15/2018 0.9 0.5 - 1.4 mg/dL Final     Calcium   Date Value Ref Range Status   10/15/2018 9.0 8.7 - 10.5 mg/dL Final     Total Protein   Date Value Ref Range Status   10/15/2018 6.7 6.0 - 8.4 g/dL Final     Albumin   Date Value Ref Range Status   10/15/2018 2.5 (L) 3.5 - 5.2 g/dL Final     Total Bilirubin   Date Value Ref Range Status   10/15/2018 3.1 (H) 0.1 - 1.0 mg/dL Final     Comment:     For infants and newborns, interpretation of results should be based  on gestational age, weight and in agreement with clinical  observations.  Premature Infant recommended reference ranges:  Up to 24 hours.............<8.0 mg/dL  Up to 48 hours............<12.0 mg/dL  3-5 days..................<15.0 mg/dL  6-29 days.................<15.0 mg/dL       Alkaline Phosphatase   Date Value Ref Range Status   10/15/2018 228 (H) 55 - 135 U/L Final     AST   Date Value Ref Range Status   10/15/2018 62 (H) 10 - 40 U/L Final     ALT   Date Value Ref Range Status   10/15/2018 78 (H) 10 - 44 U/L Final     Anion Gap   Date Value Ref Range Status   10/15/2018 12 8 - 16 mmol/L Final     eGFR if    Date Value Ref Range Status   10/15/2018 >60 >60 mL/min/1.73 m^2 Final     eGFR if non    Date Value Ref Range Status   10/15/2018 >60 >60 mL/min/1.73 m^2 Final     Comment:     Calculation used to obtain the estimated glomerular filtration  rate (eGFR) is the CKD-EPI  equation.          Recent Labs   Lab  10/14/18   1059   INR  1.2     Ultrasound:  Hepatosplenomegaly, ascites, and right pleural effusion.  No liver mass or biliary ductal dilatation.  Nonspecific gallbladder wall thickening, which may relate to underlying hepatic dysfunction    A/P:  The patient is a 58 year old man with a history of HTN, DM, and gout presenting with right hand pain with cellulitis, new onset ascites, and lower extremity swelling.  1.  Ascites - the patient appears to have cirrhosis.  He rarely drinks alcohol and he does not use intravenous drugs, have tattoos, or previously received blood transfusions.  A hepatitis panel is pending.  An RAYMOND level and iron studies were normal.  An anti-smooth muscle antibody, anti-LKM, IgG, ceruloplasmin, and alpha 1 antitrypsin level will be checked.  If an etiology is not found, he may need a liver biopsy.  The patient may have MENEZES cirrhosis.  A paracentesis is pending today and the fluid should be sent for cell count and differential, gram stain and culture, albumin, and protein.  A low salt, low fluid diet was emphasized.  Diuretics will be started.  He would eventually benefit from an EGD for variceal screening.

## 2018-10-15 NOTE — PLAN OF CARE
Problem: Patient Care Overview  Goal: Plan of Care Review  Free of falls. No distress. Is aware of Paracentesis procedure on today. Left leg dressing intact with yellow drainage noted. IV antibiotic on board. Wound care and Ortho consulted. Side rails up for safety. Instructed to call for assist. Call light in reach. Will continue to monitor.Cayla

## 2018-10-16 PROBLEM — I50.42 CHRONIC COMBINED SYSTOLIC AND DIASTOLIC HEART FAILURE: Status: ACTIVE | Noted: 2018-10-16

## 2018-10-16 PROBLEM — I50.42 CHRONIC COMBINED SYSTOLIC AND DIASTOLIC HEART FAILURE: Chronic | Status: ACTIVE | Noted: 2018-10-16

## 2018-10-16 LAB
ALBUMIN SERPL BCP-MCNC: 2.3 G/DL
ALP SERPL-CCNC: 214 U/L
ALT SERPL W/O P-5'-P-CCNC: 62 U/L
ANION GAP SERPL CALC-SCNC: 9 MMOL/L
AORTIC ROOT ANNULUS: 2.58 CM
AORTIC VALVE CUSP SEPERATION: 1.7 CM
ASCENDING AORTA: 2.61 CM
AST SERPL-CCNC: 48 U/L
AV MEAN GRADIENT: 3.16 MMHG
AV PEAK GRADIENT: 4.86 MMHG
AV VALVE AREA: 1.19 CM2
BILIRUB SERPL-MCNC: 2.1 MG/DL
BILIRUB UR QL STRIP: NEGATIVE
BSA FOR ECHO PROCEDURE: 2.19 M2
BUN SERPL-MCNC: 43 MG/DL
CALCIUM SERPL-MCNC: 8.9 MG/DL
CHLORIDE SERPL-SCNC: 101 MMOL/L
CLARITY UR: CLEAR
CO2 SERPL-SCNC: 23 MMOL/L
COLOR UR: YELLOW
CREAT SERPL-MCNC: 1 MG/DL
CREAT UR-MCNC: 60.8 MG/DL
CV ECHO LV RWT: 0.38 CM
DOP CALC AO PEAK VEL: 1.1 M/S
DOP CALC AO VTI: 20.33 CM
DOP CALC LVOT AREA: 1.74 CM2
DOP CALC LVOT DIAMETER: 1.49 CM
DOP CALC LVOT STROKE VOLUME: 24.14 CM3
DOP CALCLVOT PEAK VEL VTI: 13.85 CM
E WAVE DECELERATION TIME: 123.88 MSEC
E/A RATIO: 3.79
ECHO LV POSTERIOR WALL: 1.12 CM (ref 0.6–1.1)
EST. GFR  (AFRICAN AMERICAN): >60 ML/MIN/1.73 M^2
EST. GFR  (NON AFRICAN AMERICAN): >60 ML/MIN/1.73 M^2
FRACTIONAL SHORTENING: 12 % (ref 28–44)
GLUCOSE SERPL-MCNC: 61 MG/DL
GLUCOSE UR QL STRIP: NEGATIVE
HGB UR QL STRIP: NEGATIVE
INTERVENTRICULAR SEPTUM: 1.04 CM (ref 0.6–1.1)
IVRT: 0.07 MSEC
KETONES UR QL STRIP: NEGATIVE
LA MAJOR: 7.18 CM
LA MINOR: 5.84 CM
LA WIDTH: 5.25 CM
LEFT ATRIUM SIZE: 4.85 CM
LEFT ATRIUM VOLUME INDEX: 63.7 ML/M2
LEFT ATRIUM VOLUME: 139.4 CM3
LEFT INTERNAL DIMENSION IN SYSTOLE: 4.98 CM (ref 2.1–4)
LEFT VENTRICLE DIASTOLIC VOLUME INDEX: 71.71 ML/M2
LEFT VENTRICLE DIASTOLIC VOLUME: 157.05 ML
LEFT VENTRICLE MASS INDEX: 112.7 G/M2
LEFT VENTRICLE SYSTOLIC VOLUME INDEX: 53.6 ML/M2
LEFT VENTRICLE SYSTOLIC VOLUME: 117.3 ML
LEFT VENTRICULAR INTERNAL DIMENSION IN DIASTOLE: 5.65 CM (ref 3.5–6)
LEFT VENTRICULAR MASS: 246.89 G
LEUKOCYTE ESTERASE UR QL STRIP: NEGATIVE
MV PEAK A VEL: 0.29 M/S
MV PEAK E VEL: 1.1 M/S
NITRITE UR QL STRIP: NEGATIVE
PH UR STRIP: 5 [PH] (ref 5–8)
PISA TR MAX VEL: 3.77 M/S
POCT GLUCOSE: 141 MG/DL (ref 70–110)
POCT GLUCOSE: 171 MG/DL (ref 70–110)
POCT GLUCOSE: 232 MG/DL (ref 70–110)
POCT GLUCOSE: 249 MG/DL (ref 70–110)
POCT GLUCOSE: 69 MG/DL (ref 70–110)
POTASSIUM SERPL-SCNC: 4.4 MMOL/L
PROT SERPL-MCNC: 6.5 G/DL
PROT UR QL STRIP: NEGATIVE
PROT UR-MCNC: 17 MG/DL
PROT/CREAT UR: 0.28 MG/G{CREAT}
PV PEAK GRADIENT: 1.99 MMHG
PV PEAK VELOCITY: 0.71 CM/S
RA MAJOR: 5.97 CM
RA PRESSURE: 3 MMHG
RA WIDTH: 4.42 CM
RETIRED EF AND QEF - SEE NOTES: 25.31 %
RIGHT VENTRICULAR END-DIASTOLIC DIMENSION: 3.87 CM
SINUS: 2.81 CM
SMOOTH MUSCLE AB TITR SER IF: NORMAL {TITER}
SODIUM SERPL-SCNC: 133 MMOL/L
SP GR UR STRIP: 1.02 (ref 1–1.03)
STJ: 2.63 CM
TR MAX PG: 56.85 MMHG
TRICUSPID ANNULAR PLANE SYSTOLIC EXCURSION: 0.01 CM
TV REST PULMONARY ARTERY PRESSURE: 59.85 MMHG
URN SPEC COLLECT METH UR: NORMAL
UROBILINOGEN UR STRIP-ACNC: NEGATIVE EU/DL

## 2018-10-16 PROCEDURE — 63600175 PHARM REV CODE 636 W HCPCS: Performed by: INTERNAL MEDICINE

## 2018-10-16 PROCEDURE — 82570 ASSAY OF URINE CREATININE: CPT

## 2018-10-16 PROCEDURE — 25000003 PHARM REV CODE 250: Performed by: INTERNAL MEDICINE

## 2018-10-16 PROCEDURE — G8987 SELF CARE CURRENT STATUS: HCPCS | Mod: CJ

## 2018-10-16 PROCEDURE — 99254 IP/OBS CNSLTJ NEW/EST MOD 60: CPT | Mod: ,,, | Performed by: INTERNAL MEDICINE

## 2018-10-16 PROCEDURE — P9047 ALBUMIN (HUMAN), 25%, 50ML: HCPCS | Performed by: INTERNAL MEDICINE

## 2018-10-16 PROCEDURE — 25500020 PHARM REV CODE 255: Performed by: INTERNAL MEDICINE

## 2018-10-16 PROCEDURE — 97110 THERAPEUTIC EXERCISES: CPT

## 2018-10-16 PROCEDURE — G8989 SELF CARE D/C STATUS: HCPCS | Mod: CJ

## 2018-10-16 PROCEDURE — 63600175 PHARM REV CODE 636 W HCPCS: Performed by: EMERGENCY MEDICINE

## 2018-10-16 PROCEDURE — 81003 URINALYSIS AUTO W/O SCOPE: CPT

## 2018-10-16 PROCEDURE — 36415 COLL VENOUS BLD VENIPUNCTURE: CPT

## 2018-10-16 PROCEDURE — 80053 COMPREHEN METABOLIC PANEL: CPT

## 2018-10-16 PROCEDURE — 11000001 HC ACUTE MED/SURG PRIVATE ROOM

## 2018-10-16 PROCEDURE — 99233 SBSQ HOSP IP/OBS HIGH 50: CPT | Mod: ,,, | Performed by: SURGERY

## 2018-10-16 PROCEDURE — G8988 SELF CARE GOAL STATUS: HCPCS | Mod: CI

## 2018-10-16 PROCEDURE — 97535 SELF CARE MNGMENT TRAINING: CPT

## 2018-10-16 RX ORDER — ALBUMIN HUMAN 250 G/1000ML
25 SOLUTION INTRAVENOUS
Status: COMPLETED | OUTPATIENT
Start: 2018-10-16 | End: 2018-10-16

## 2018-10-16 RX ORDER — CARVEDILOL 6.25 MG/1
6.25 TABLET ORAL 2 TIMES DAILY
Status: DISCONTINUED | OUTPATIENT
Start: 2018-10-16 | End: 2018-10-17 | Stop reason: HOSPADM

## 2018-10-16 RX ADMIN — BENAZEPRIL HYDROCHLORIDE 40 MG: 40 TABLET, COATED ORAL at 08:10

## 2018-10-16 RX ADMIN — ACETAMINOPHEN 650 MG: 325 TABLET ORAL at 03:10

## 2018-10-16 RX ADMIN — FUROSEMIDE 40 MG: 40 TABLET ORAL at 08:10

## 2018-10-16 RX ADMIN — MULTIPLE VITAMINS W/ MINERALS TAB 1 TABLET: TAB at 06:10

## 2018-10-16 RX ADMIN — ASPIRIN 81 MG 81 MG: 81 TABLET ORAL at 08:10

## 2018-10-16 RX ADMIN — AMLODIPINE BESYLATE 5 MG: 5 TABLET ORAL at 08:10

## 2018-10-16 RX ADMIN — KETOROLAC TROMETHAMINE 1 DROP: 5 SOLUTION/ DROPS OPHTHALMIC at 09:10

## 2018-10-16 RX ADMIN — INSULIN ASPART 2 UNITS: 100 INJECTION, SOLUTION INTRAVENOUS; SUBCUTANEOUS at 09:10

## 2018-10-16 RX ADMIN — IOHEXOL 100 ML: 350 INJECTION, SOLUTION INTRAVENOUS at 10:10

## 2018-10-16 RX ADMIN — HEPARIN SODIUM 5000 UNITS: 5000 INJECTION, SOLUTION INTRAVENOUS; SUBCUTANEOUS at 06:10

## 2018-10-16 RX ADMIN — DORZOLAMIDE HYDROCHLORIDE AND TIMOLOL MALEATE 1 DROP: 20; 5 SOLUTION/ DROPS OPHTHALMIC at 03:10

## 2018-10-16 RX ADMIN — DORZOLAMIDE HYDROCHLORIDE AND TIMOLOL MALEATE 1 DROP: 20; 5 SOLUTION/ DROPS OPHTHALMIC at 09:10

## 2018-10-16 RX ADMIN — EZETIMIBE 10 MG: 10 TABLET ORAL at 08:10

## 2018-10-16 RX ADMIN — CARVEDILOL 6.25 MG: 6.25 TABLET, FILM COATED ORAL at 03:10

## 2018-10-16 RX ADMIN — KETOROLAC TROMETHAMINE 1 DROP: 5 SOLUTION/ DROPS OPHTHALMIC at 08:10

## 2018-10-16 RX ADMIN — ALBUMIN (HUMAN) 25 G: 25 SOLUTION INTRAVENOUS at 03:10

## 2018-10-16 RX ADMIN — HEPARIN SODIUM 5000 UNITS: 5000 INJECTION, SOLUTION INTRAVENOUS; SUBCUTANEOUS at 03:10

## 2018-10-16 RX ADMIN — PREDNISOLONE ACETATE 1 DROP: 10 SUSPENSION/ DROPS OPHTHALMIC at 09:10

## 2018-10-16 RX ADMIN — PREDNISOLONE ACETATE 1 DROP: 10 SUSPENSION/ DROPS OPHTHALMIC at 03:10

## 2018-10-16 RX ADMIN — PREDNISOLONE ACETATE 1 DROP: 10 SUSPENSION/ DROPS OPHTHALMIC at 08:10

## 2018-10-16 RX ADMIN — BRIMONIDINE TARTRATE 1 DROP: 1 SOLUTION/ DROPS OPHTHALMIC at 03:10

## 2018-10-16 RX ADMIN — CARVEDILOL 6.25 MG: 6.25 TABLET, FILM COATED ORAL at 09:10

## 2018-10-16 RX ADMIN — ALBUMIN (HUMAN) 25 G: 25 SOLUTION INTRAVENOUS at 08:10

## 2018-10-16 RX ADMIN — ALLOPURINOL 200 MG: 100 TABLET ORAL at 08:10

## 2018-10-16 RX ADMIN — BRIMONIDINE TARTRATE 1 DROP: 1 SOLUTION/ DROPS OPHTHALMIC at 08:10

## 2018-10-16 RX ADMIN — CEFTRIAXONE 2 G: 2 INJECTION, SOLUTION INTRAVENOUS at 05:10

## 2018-10-16 RX ADMIN — DORZOLAMIDE HYDROCHLORIDE AND TIMOLOL MALEATE 1 DROP: 20; 5 SOLUTION/ DROPS OPHTHALMIC at 08:10

## 2018-10-16 RX ADMIN — HEPARIN SODIUM 5000 UNITS: 5000 INJECTION, SOLUTION INTRAVENOUS; SUBCUTANEOUS at 09:10

## 2018-10-16 RX ADMIN — SPIRONOLACTONE 100 MG: 100 TABLET, FILM COATED ORAL at 08:10

## 2018-10-16 RX ADMIN — INSULIN ASPART 2 UNITS: 100 INJECTION, SOLUTION INTRAVENOUS; SUBCUTANEOUS at 05:10

## 2018-10-16 RX ADMIN — BRIMONIDINE TARTRATE 1 DROP: 1 SOLUTION/ DROPS OPHTHALMIC at 09:10

## 2018-10-16 RX ADMIN — KETOROLAC TROMETHAMINE 1 DROP: 5 SOLUTION/ DROPS OPHTHALMIC at 03:10

## 2018-10-16 NOTE — PROGRESS NOTES
[facility] Hospital Medicine  Progress Note    Patient Name: Marvin Ray  MRN: [id number]  Patient Class: IP- Inpatient   Admission Date: 10/10/2018  Length of Stay: 5 days  Attending Physician: Kirsten Trinh MD  Primary Care Provider: Rubén Davis MD        Subjective:     Principal Problem:Left leg cellulitis    HPI:  Mr. Ray is a pleasant 59 yo man with IDDM (last A1c >14% 7/2018), essential HTN, HLD, gout, former tobacco abuse, and PVD with remote resection of the right 1st and 2nd toe [facility]. He has chronic right hand pain from arthritis but he felt the pain had gotten much worse in the past 2-3 days. He describes a shooting, aching pain, particularly through his thumb, 2nd, and 3rd fingers. He has limited ROM due to arthritis but reports that this is not new. He also has limited wrist flexion and extension.   In the [facility] he was noted to have concerning findings of his left lower extremity. He has chronic woody appearance and hyperpigmentation of the skin on his LLE, but in the past couple of days the skin has been more erythematous. He also notes new edema of the LLE as well as a new blister on the dorsum of his foot. He had decreased pulses though they could be found with doppler.     Hospital Course:  Mr. Ray was admitted due to concern for LLE cellulitis. Per the [place] [facility] physician, she found his exam to be concerning for necrotizing fasciitis, however upon my exam I had very low clinical concern for this. He did appear to have chronic venous insufficiency and likely PVD. Vascular was consulted.  He had chronic hyperpigmentation of his LLE c/w chronic venous stasis with an overlying area of erythema and calor concerning for cellulitis. He was started on empiric antibiotics with meropenam and vancomycin (penicillin allergic). He did have a slightly elevated lactate that remained stable. Blood cultures grew group G  Strep. Antibiotics narrowed to ceftriaxone with final susceptibility results on 10/13/18.  He was found to have slightly elevated LFTs. Abdominal US with hepatosplenomegaly, ascites, and a right pleural effusion. GI consulted. Dx paracentesis on 10/15/18 w/ SAAG 0.9, cell count pending.  Wound care followed during admission. His leg cellulitis appeared to improve with antibiotics, but he did have a large blister on his foot that continued to expand. He will need aggressive wound care after discharge. The wound was worsening again on 10/15 and will be watched again overnight.    Interval history:  No acute events. No new complaints. Blister on the left foot expanding.    Review of Systems   Constitutional: Negative for chills and fever.   HENT: Negative for congestion and rhinorrhea.    Eyes: Negative for photophobia and visual disturbance.   Respiratory: Negative for cough and shortness of breath.    Cardiovascular: Positive for leg swelling. Negative for chest pain.   Gastrointestinal: Negative for abdominal pain, constipation, diarrhea and nausea.   Genitourinary: Negative for difficulty urinating and dysuria.   Musculoskeletal: Positive for arthralgias. Negative for gait problem and joint swelling.   Skin: Positive for color change and wound.   Neurological: Negative for dizziness and light-headedness.   Psychiatric/Behavioral: Negative for confusion and dysphoric mood.     Objective:     Vital Signs (Most Recent):  Temp: 97.8 °F (36.6 °C) (10/15/18 1934)  Pulse: 110 (10/15/18 1934)  Resp: 18 (10/15/18 1934)  BP: 127/74 (10/15/18 1934)  SpO2: (!) 90 % (10/15/18 1934) Vital Signs (24h Range):  Temp:  [97.6 °F (36.4 °C)-99 °F (37.2 °C)] 97.8 °F (36.6 °C)  Pulse:  [] 110  Resp:  [17-18] 18  SpO2:  [90 %-96 %] 90 %  BP: ()/(62-76) 127/74     Weight: 96.2 kg (212 lb)  Body mass index is 29.57 kg/m².    Physical Exam   Constitutional: He is oriented to person, place, and time. He appears well-developed and  well-nourished. No distress.   HENT:   Right Ear: External ear normal.   Left Ear: External ear normal.   Nose: Nose normal.   Mouth/Throat: Oropharynx is clear and moist.   Eyes: Conjunctivae and EOM are normal. Pupils are equal, round, and reactive to light. No scleral icterus.   Neck: Normal range of motion. Neck supple. No thyromegaly present.   Cardiovascular: Normal rate and normal heart sounds. Exam reveals no friction rub.   No murmur heard.  Pulmonary/Chest: Effort normal and breath sounds normal. No respiratory distress. He has no wheezes. He has no rales.   Abdominal: Soft. Bowel sounds are normal. He exhibits no mass. There is no tenderness.   Obese   Musculoskeletal: He exhibits deformity. He exhibits no edema.   Bilateral hands with synovitis particularly of the PIPs right much worse than left and right medial MCPs. Limited ROM in the hands and wrist bilaterally.  Right great and second toe amputated, well healed.   Neurological: He is alert and oriented to person, place, and time. No cranial nerve deficit.   Skin: Skin is warm and dry. No pallor.   Left lower extremity wrapped with gauze with yellow drainage seeping through bandage.   Psychiatric: He has a normal mood and affect. His behavior is normal. Thought content normal.         CRANIAL NERVES     CN III, IV, VI   Pupils are equal, round, and reactive to light.  Extraocular motions are normal.         Significant Labs: All pertinent labs within the past 24 hours have been reviewed.    Significant Imaging: I have reviewed and interpreted all pertinent imaging results/findings within the past 24 hours.    Assessment/Plan:      * Left leg cellulitis    LLE concerning for cellulitis. Blood cultures with Strep C which is unlikely to be a contaminant. Wound care consulted. Treated empirically with meropenem and vanc. Vanc stopped as meropenam would cover Strep. Antibiotics narrowed to ceftriaxone with culture resulted. Appreciate wound care recs.     "     Stage 3 chronic kidney disease    This is likely his new baseline. Monitor.          Hepatosplenomegaly    He was noted to have abnormal LFT and US abdomen showed HSM. GI consulted. Negative HCV in 2015. Plan for Dx para on not conclusive for portal HTN. Cell count/diff pending.        Multiple open wounds of lower leg    Wound care following. Worsened blistering on 10/15. Monitor again overnight. He will need aggressive wound care after discharge.        Right wrist pain    Acute on chronic right wrist pain. Xrays showed "significant abnormality with resorption or resection involving the majority of the lunate". Ortho recs appreciated.        PVD (peripheral vascular disease)    Given his tobacco history, previous poor wound healing, calcifications seen on Xray, and skin findings, PVD highly suspected. Vascular consulted, recs appreciated.         Essential hypertension    Continue home amlodipine and benazepril. PRN clonidine available.        Tophaceous gout    Continue home allopurinol. Will need follow up as outpatient. He has progressive deformities of his hands. Does not appear to have an acute flare.        Neovascular glaucoma of both eyes    Continue eye drops.        HLD (hyperlipidemia)    Continue Zetia.        DM type 2 with diabetic peripheral neuropathy    Continue basal/bolus insulin. Adjust as needed. He has been seen in endocrine clinic. Continue to follow up with them as outpatient.          VTE Risk Mitigation (From admission, onward)        Ordered     heparin (porcine) injection 5,000 Units  Every 8 hours      10/10/18 1710     IP VTE HIGH RISK PATIENT  Once      10/10/18 1710              Kirsten Trinh MD  Department of Hospital Medicine   [facility]  "

## 2018-10-16 NOTE — ASSESSMENT & PLAN NOTE
-LLE chronic PVD on US with SIDRA 0.9, appropriate ankle waveform, triphasic signals on doppler - will evaluate likely as outpatient once LLE cellulitis resolved and pt is recovered from other current clinical conditions

## 2018-10-16 NOTE — PROGRESS NOTES
[facility]  Vascular Surgery  Progress Note    Patient Name: Marvin Ray  MRN: [id number]  Admission Date: 10/10/2018  Primary Care Provider: Rubén Davis MD    Subjective:     Interval History: No complaints today.    Post-Op Info:  * No surgery found *           Medications:  Continuous Infusions:  Scheduled Meds:   allopurinol  200 mg Oral Daily    amLODIPine  5 mg Oral Daily    aspirin  81 mg Oral Daily    benazepril  40 mg Oral Daily    brimonidine 0.1%  1 drop Both Eyes TID    carvedilol  6.25 mg Oral BID    cefTRIAXone (ROCEPHIN) IVPB  2 g Intravenous Q24H    dorzolamide-timolol 2-0.5%  1 drop Both Eyes TID    ezetimibe  10 mg Oral Daily    furosemide  40 mg Oral Daily    heparin (porcine)  5,000 Units Subcutaneous Q8H    insulin detemir U-100  20 Units Subcutaneous QHS    ketorolac 0.5%  1 drop Right Eye TID    multivit-iron-FA-calcium-mins  1 tablet Oral QAM    prednisoLONE acetate  1 drop Right Eye TID    spironolactone  100 mg Oral Daily     PRN Meds:acetaminophen, cloNIDine, dextrose 50%, dextrose 50%, glucagon (human recombinant), glucose, glucose, influenza, insulin aspart U-100     Objective:     Vital Signs (Most Recent):  Temp: 97.4 °F (36.3 °C) (10/16/18 1524)  Pulse: 94 (10/16/18 1524)  Resp: 17 (10/16/18 1524)  BP: 128/76 (10/16/18 1524)  SpO2: (!) 93 % (10/16/18 1524) Vital Signs (24h Range):  Temp:  [97.3 °F (36.3 °C)-97.8 °F (36.6 °C)] 97.4 °F (36.3 °C)  Pulse:  [] 94  Resp:  [17-20] 17  SpO2:  [89 %-95 %] 93 %  BP: (106-141)/(57-80) 128/76     Date 10/16/18 0700 - 10/17/18 0659   Shift 6040-3204 8944-1761 6796-0620 24 Hour Total   INTAKE   P.O. 240 120  360   Shift Total(mL/kg) 240(2.5) 120(1.2)  360(3.7)   OUTPUT   Shift Total(mL/kg)       Weight (kg) 96.2 96.2 96.2 96.2       Physical Exam   Constitutional: He is oriented to person, place, and time. He appears well-developed and well-nourished. No distress.   HENT:   Head: Normocephalic and  atraumatic.   Eyes: Conjunctivae are normal.   Neck: Neck supple.   Cardiovascular: Normal rate.   Pulses:       Radial pulses are 2+ on the right side, and 2+ on the left side.        Femoral pulses are 2+ on the right side, and 2+ on the left side.       Dorsalis pedis pulses are Detected w/ doppler on the right side, and Detected w/ doppler on the left side.        Posterior tibial pulses are Detected w/ doppler on the right side, and Detected w/ doppler on the left side.   Pulmonary/Chest: Effort normal. He has no wheezes.   Abdominal: Soft. He exhibits no distension and no mass. There is no tenderness. There is no rebound and no guarding.   Musculoskeletal: Normal range of motion. He exhibits edema. He exhibits no tenderness or deformity.   Feet:   Right Foot:   Skin Integrity: Positive for skin breakdown, erythema and dry skin.   Left Foot:   Skin Integrity: Positive for dry skin. Negative for ulcer.   Neurological: He is alert and oriented to person, place, and time. A sensory deficit is present. No cranial nerve deficit.   Skin: Skin is warm. Capillary refill takes less than 2 seconds. No rash noted. He is diaphoretic. There is erythema. No pallor.   BLE venous stasis changes with skin breakdown to LLE with serous drainage, bulla to plantar aspect of foot, no purulence   Psychiatric: He has a normal mood and affect.   Vitals reviewed.      Significant Labs:  All pertinent labs from the last 24 hours have been reviewed.    Significant Diagnostics:  I have reviewed all pertinent imaging results/findings within the past 24 hours.    Assessment/Plan:     * Left leg cellulitis    -LLE skin changes and cellulitis likely due to chronic venous hypertension and diabetes - recommend compression, elevation, dietary changes associated with water and sodium intake discussed at length with patient  -Rec wound care consult to improve skin integrity and protect skin  -Once cellulitis resolved, will evaluate outpatient for  LLE revascularization (scheduled)        PVD (peripheral vascular disease)    -LLE chronic PVD on US with SIDRA 0.9, appropriate ankle waveform, triphasic signals on doppler - will evaluate likely as outpatient once LLE cellulitis resolved and pt is recovered from other current clinical conditions        DM type 2 with diabetic peripheral neuropathy    -rec ADA diet and strict glycemic control            Mitch Chan MD  Vascular Surgery  [facility]

## 2018-10-16 NOTE — ASSESSMENT & PLAN NOTE
Known diffuse triple-vessel disease which is small vessel and appears not a candidate for intervention percutaneous or surgical  No functional ischemic assessment on file, plan for NST in a.m.  Optimize medicines for secondary prevention as tolerated

## 2018-10-16 NOTE — SUBJECTIVE & OBJECTIVE
Medications:  Continuous Infusions:  Scheduled Meds:   allopurinol  200 mg Oral Daily    amLODIPine  5 mg Oral Daily    aspirin  81 mg Oral Daily    benazepril  40 mg Oral Daily    brimonidine 0.1%  1 drop Both Eyes TID    carvedilol  6.25 mg Oral BID    cefTRIAXone (ROCEPHIN) IVPB  2 g Intravenous Q24H    dorzolamide-timolol 2-0.5%  1 drop Both Eyes TID    ezetimibe  10 mg Oral Daily    furosemide  40 mg Oral Daily    heparin (porcine)  5,000 Units Subcutaneous Q8H    insulin detemir U-100  20 Units Subcutaneous QHS    ketorolac 0.5%  1 drop Right Eye TID    multivit-iron-FA-calcium-mins  1 tablet Oral QAM    prednisoLONE acetate  1 drop Right Eye TID    spironolactone  100 mg Oral Daily     PRN Meds:acetaminophen, cloNIDine, dextrose 50%, dextrose 50%, glucagon (human recombinant), glucose, glucose, influenza, insulin aspart U-100     Objective:     Vital Signs (Most Recent):  Temp: 97.4 °F (36.3 °C) (10/16/18 1524)  Pulse: 94 (10/16/18 1524)  Resp: 17 (10/16/18 1524)  BP: 128/76 (10/16/18 1524)  SpO2: (!) 93 % (10/16/18 1524) Vital Signs (24h Range):  Temp:  [97.3 °F (36.3 °C)-97.8 °F (36.6 °C)] 97.4 °F (36.3 °C)  Pulse:  [] 94  Resp:  [17-20] 17  SpO2:  [89 %-95 %] 93 %  BP: (106-141)/(57-80) 128/76     Date 10/16/18 0700 - 10/17/18 0659   Shift 9514-2564 3054-6475 2429-0527 24 Hour Total   INTAKE   P.O. 240 120  360   Shift Total(mL/kg) 240(2.5) 120(1.2)  360(3.7)   OUTPUT   Shift Total(mL/kg)       Weight (kg) 96.2 96.2 96.2 96.2       Physical Exam   Constitutional: He is oriented to person, place, and time. He appears well-developed and well-nourished. No distress.   HENT:   Head: Normocephalic and atraumatic.   Eyes: Conjunctivae are normal.   Neck: Neck supple.   Cardiovascular: Normal rate.   Pulses:       Radial pulses are 2+ on the right side, and 2+ on the left side.        Femoral pulses are 2+ on the right side, and 2+ on the left side.       Dorsalis pedis pulses are Detected  w/ doppler on the right side, and Detected w/ doppler on the left side.        Posterior tibial pulses are Detected w/ doppler on the right side, and Detected w/ doppler on the left side.   Pulmonary/Chest: Effort normal. He has no wheezes.   Abdominal: Soft. He exhibits no distension and no mass. There is no tenderness. There is no rebound and no guarding.   Musculoskeletal: Normal range of motion. He exhibits edema. He exhibits no tenderness or deformity.   Feet:   Right Foot:   Skin Integrity: Positive for skin breakdown, erythema and dry skin.   Left Foot:   Skin Integrity: Positive for dry skin. Negative for ulcer.   Neurological: He is alert and oriented to person, place, and time. A sensory deficit is present. No cranial nerve deficit.   Skin: Skin is warm. Capillary refill takes less than 2 seconds. No rash noted. He is diaphoretic. There is erythema. No pallor.   BLE venous stasis changes with skin breakdown to LLE with serous drainage, bulla to plantar aspect of foot, no purulence   Psychiatric: He has a normal mood and affect.   Vitals reviewed.      Significant Labs:  All pertinent labs from the last 24 hours have been reviewed.    Significant Diagnostics:  I have reviewed all pertinent imaging results/findings within the past 24 hours.

## 2018-10-16 NOTE — CONSULTS
Patient was brought to IR on 10/15/18 and only 2 cc of ascitic fluid could be safely aspirated. All four quadrants were evaluated with only trace fluid. For this reason, a new therapeutic paracentesis would not likely be possible. If there is concern for increasing fluid, re-imaging recommended.     Wilbert Dennis MD  Department of Radiology  Pager: [phone]

## 2018-10-16 NOTE — PLAN OF CARE
Problem: Occupational Therapy Goal  Goal: Occupational Therapy Goal  Goals to be met by: 10/27/18     Patient will increase functional independence with ADLs by performing:    UE Dressing with Modified [place].  LE Dressing with Set-up Assistance.  Grooming while standing at sink with Contact Guard Assistance.  Toileting from toilet with Stand-by Assistance for hygiene and clothing management.   Pt to participate in L hand [facility] tasks to increase use for ADLs.      Outcome: Ongoing (interventions implemented as appropriate)  The patient participated in UE therex/ROM while seated on the EOB. The patient with drainage present in BLE and abdomin.    Comments: Patient will benefit from OT to address functional deficits.

## 2018-10-16 NOTE — PT/OT/SLP PROGRESS
Physical Therapy      Patient Name:  Marvin Ray   MRN:  [id number]    Patient not seen today secondary to Unavailable (pt is off the unit to IR ) . Will follow-up as able later hour/day .    More Howe, PTA

## 2018-10-16 NOTE — SUBJECTIVE & OBJECTIVE
Past Medical History:   Diagnosis Date    Arthritis     Diabetes mellitus     Diabetic retinopathy     Glaucoma     Gout     Hyperlipemia     Hypertension        Past Surgical History:   Procedure Laterality Date    AHMED GLAUCOMA IMPLANT Left 2011    DONE AT [facility]    BAERVELDT GLAUCOMA IMPLANT Left 2012    WITH CATARACT EXTRACTION//DONE AT [facility]    CATARACT EXTRACTION W/  INTRAOCULAR LENS IMPLANT Left 2012    WITH BAERVEDT//DONE AT [facility]    CATARACT EXTRACTION W/  INTRAOCULAR LENS IMPLANT Right 09/26/2018    COMPLEX ()    CB DESTRUCTION WITH CYCLO G6 Left 02/15/2017        CYST REMOVAL      HEART CATH-LEFT N/A 5/6/2016    Performed by Mike Magana MD at [facility] CATH LAB    INSERTION, IOL PROSTHESIS Right 9/26/2018    Performed by Perla Cortés MD at [facility] OR [address] [place] [address]    INTRAOCULAR PROSTHESES INSERTION Right 9/26/2018    Procedure: INSERTION, IOL PROSTHESIS;  Surgeon: Perla Cortés MD;  Location: [facility] OR [address] [place] [address];  Service: Ophthalmology;  Laterality: Right;    PHACOEMULSIFICATION OF CATARACT Right 9/26/2018    Procedure: PHACOEMULSIFICATION, CATARACT;  Surgeon: Perla Cortés MD;  Location: [facility] OR [address] [place] [address];  Service: Ophthalmology;  Laterality: Right;    PHACOEMULSIFICATION, CATARACT Right 9/26/2018    Performed by Perla Cortés MD at [facility] OR [address] [place] [address]    Right foot surgery  10/2014    TOE AMPUTATION      TONSILLECTOMY      TRABECULECTOMY/G 6 LASER Left 2/15/2017    Performed by Perla Cortés MD at [facility] OR [address] [place] [address]       Review of patient's allergies indicates:   Allergen Reactions    Statins-hmg-coa reductase inhibitors      Generalized Pain    Onglyza [saxagliptin]     Penicillins Rash       No current facility-administered medications on file prior to encounter.      Current Outpatient Medications on File Prior to Encounter   Medication Sig    allopurinol (ZYLOPRIM) 100 MG tablet Take 2 tablets (200 mg total) by mouth once  "daily.    amLODIPine (NORVASC) 5 MG tablet TAKE 1 TABLET(5 MG) BY MOUTH EVERY DAY    aspirin 81 MG Chew Take 81 mg by mouth once daily.    benazepril (LOTENSIN) 40 MG tablet TAKE 1 TABLET(40 MG) BY MOUTH TWICE DAILY    brimonidine 0.1% (ALPHAGAN P) 0.1 % Drop Place 1 drop into both eyes 3 (three) times daily.    coenzyme Q10 100 mg capsule Take 100 mg by mouth every morning.    dorzolamide-timolol 2-0.5% (COSOPT) 22.3-6.8 mg/mL ophthalmic solution Place 1 drop into both eyes 3 (three) times daily.    ezetimibe (ZETIA) 10 mg tablet Take 1 tablet (10 mg total) by mouth once daily.    fish oil-omega-3 fatty acids 300-1,000 mg capsule Take 2 capsules (2 g total) by mouth 2 (two) times daily. (Patient taking differently: Take 1 g by mouth 2 (two) times daily. )    insulin aspart U-100 (NOVOLOG) 100 unit/mL InPn pen Inject 12 Units into the skin 3 (three) times daily with meals.    insulin glargine-lixisenatide (SOLIQUA 100/33) 100 unit-33 mcg/mL InPn Inject 34 units once daily    ketorolac 0.5% (ACULAR) 0.5 % Drop Place 1 drop into the right eye 3 (three) times daily.    MULTIVIT,THER IRON,CA,FA & MIN (MULTIVITAMIN) Tab Take 1 tablet by mouth every morning.     pen needle, diabetic 31 gauge x 1/4" Ndle Use as directed    prednisoLONE acetate (PRED FORTE) 1 % DrpS Place 1 drop into the right eye 3 (three) times daily.     Family History     Problem Relation (Age of Onset)    Diabetes Mother    Heart disease Brother    No Known Problems Father, Sister, Maternal Aunt, Maternal Uncle, Paternal Aunt, Paternal Uncle, Maternal Grandmother, Maternal Grandfather, Paternal Grandmother, Paternal Grandfather        Tobacco Use    Smoking status: Former Smoker     Packs/day: 1.00     Years: 3.00     Pack years: 3.00     Last attempt to quit: 1984     Years since quittin.2    Smokeless tobacco: Never Used   Substance and Sexual Activity    Alcohol use: Yes     Alcohol/week: 0.6 oz     Types: 1 Cans of beer " per week     Comment: Occasional    Drug use: No    Sexual activity: Not Currently     Review of Systems   Constitution: Negative.   HENT: Negative.    Eyes: Negative.    Cardiovascular: Positive for dyspnea on exertion and leg swelling. Negative for chest pain, irregular heartbeat, near-syncope, orthopnea, palpitations, paroxysmal nocturnal dyspnea and syncope.   Respiratory: Negative for shortness of breath.    Skin: Negative.    Musculoskeletal: Positive for joint pain.   Gastrointestinal: Negative for abdominal pain, constipation and diarrhea.   Genitourinary: Negative for dysuria.   Neurological: Negative for dizziness.   Psychiatric/Behavioral: Negative.      Objective:     Vital Signs (Most Recent):  Temp: 97.3 °F (36.3 °C) (10/16/18 1143)  Pulse: 78 (10/16/18 1143)  Resp: 17 (10/16/18 1143)  BP: 123/81 (10/16/18 1143)  SpO2: (!) 94 % (10/16/18 1143) Vital Signs (24h Range):  Temp:  [97.3 °F (36.3 °C)-99 °F (37.2 °C)] 97.3 °F (36.3 °C)  Pulse:  [] 78  Resp:  [17-20] 17  SpO2:  [89 %-95 %] 94 %  BP: (106-141)/(57-81) 123/81     Weight: 96.2 kg (212 lb)  Body mass index is 29.57 kg/m².    SpO2: (!) 94 %  O2 Device (Oxygen Therapy): room air      Intake/Output Summary (Last 24 hours) at 10/16/2018 1323  Last data filed at 10/16/2018 1245  Gross per 24 hour   Intake 240 ml   Output --   Net 240 ml       Lines/Drains/Airways     Peripheral Intravenous Line                 Peripheral IV - Single Lumen 10/14/18 0718 Left;Posterior Hand 2 days                Physical Exam   Constitutional: He is oriented to person, place, and time. He appears well-developed and well-nourished. No distress.   HENT:   Head: Normocephalic and atraumatic.   Eyes: Conjunctivae and EOM are normal. Pupils are equal, round, and reactive to light.   Neck: Normal range of motion. Neck supple. No thyromegaly present.   Cardiovascular: Normal rate, regular rhythm and normal heart sounds.   No murmur heard.  Pulmonary/Chest: Effort normal  and breath sounds normal. No respiratory distress. He has no wheezes. He has no rales. He exhibits no tenderness.   Abdominal: Soft. Bowel sounds are normal.   Musculoskeletal: He exhibits edema.   Neurological: He is alert and oriented to person, place, and time.   Skin: Skin is warm and dry.   Psychiatric: He has a normal mood and affect. His behavior is normal.       Significant Labs:   CMP   Recent Labs   Lab  10/15/18   0528  10/16/18   0350   NA  135*  133*   K  4.1  4.4   CL  101  101   CO2  22*  23   GLU  80  61*   BUN  48*  43*   CREATININE  0.9  1.0   CALCIUM  9.0  8.9   PROT  6.7  6.5   ALBUMIN  2.5*  2.3*   BILITOT  3.1*  2.1*   ALKPHOS  228*  214*   AST  62*  48*   ALT  78*  62*   ANIONGAP  12  9   ESTGFRAFRICA  >60  >60   EGFRNONAA  >60  >60   , CBC No results for input(s): WBC, HGB, HCT, PLT in the last 48 hours., INR No results for input(s): INR, PROTIME in the last 48 hours., Lipid Panel No results for input(s): CHOL, HDL, LDLCALC, TRIG, CHOLHDL in the last 48 hours. and Troponin No results for input(s): TROPONINI in the last 48 hours.    Significant Imaging: Echocardiogram:   2D echo with color flow doppler:   Results for orders placed or performed during the hospital encounter of 10/12/16   2D Echo w/ Color Flow Doppler   Result Value Ref Range    EF 35 (A) 55 - 65    Mitral Valve Regurgitation MILD     Diastolic Dysfunction Yes (A)     Est. PA Systolic Pressure 54 (A)     Pericardial Effusion SMALL (A)     Tricuspid Valve Regurgitation MILD      LHC:  2016 by Dr. Chino GUTIERREZ Summary/Post-Operative Diagnosis       Three vessel coronary artery disease.    LV systolic and diastolic dysfunction.    D. Hemodynamic Results     LVEDP (Pre): 25 mmHg  LVEDP (Post): 30 mmHg  Ejection Fraction: 35%    E. Angiographic Results     Diagnostic:          Patient has a right dominant coronary artery.        - Left Main Coronary Artery:             The LM has luminal irregularities. There is BAKARI 3 flow.     -  Left Anterior Descending Artery:             The proximal LAD has a 80% stenosis. There is BAKARI 3 flow.             The mid LAD has a 90% stenosis. There is BAKARI 3 flow. The remaining portion of the vessel is diffusely diseased.     - Left Circumflex Artery:             The mid LCX has a 60% stenosis. There is BAKARI 3 flow. The remaining portion of the vessel has luminal irregularities.     - Right Coronary Artery:             The proximal RCA has a 60% stenosis. There is BAKARI 3 flow.             The mid RCA has a 80% stenosis. There is BAKARI 3 flow. The remaining portion of the vessel has luminal irregularities.     - Posterior Descending Artery:             The proximal PDA has a 60% stenosis. There is BAKARI 3 flow. The remaining portion of the vessel has luminal irregularities.     - Radial:             The radial.

## 2018-10-16 NOTE — ASSESSMENT & PLAN NOTE
LLE concerning for cellulitis. Blood cultures with Strep C which is unlikely to be a contaminant. Wound care consulted. Treated empirically with meropenem and vanc. Vanc stopped as meropenam would cover Strep. Antibiotics narrowed to ceftriaxone with culture resulted. Appreciate wound care recs.

## 2018-10-16 NOTE — PROGRESS NOTES
Wound care to left leg performed by nursing. Removed foot dressing to assess blister on medial/plantar area and blister remains with scant amount serous fluid. Base of blister appears dark. No surrounding erythema. Fifth toe remains dark in color, but firm and nail bed red. Tolerated light compression with Coban.   Discussed with patient importance of elevating leg and not keeping in dependent position continuously. Voiced understanding.   Assessed right 3rd toe at patient's request. States hit toe on Sunday on furniture and broke toenail. Reports MD removed loose nail.   Photodocumentation    Right 3rd toe- Clean wound. Scant serous drainage. Dressed with Adaptic and gauze. Wrapped with Kerlix for soft bulky dressing.   Encouraged patient to follow up with Cardiologist. Aware of heart disease.

## 2018-10-16 NOTE — CONSULTS
[facility]  Cardiology  Consult Note    Patient Name: Marvin Ray  MRN: [id number]  Admission Date: 10/10/2018  Hospital Length of Stay: 6 days  Code Status: Full Code   Attending Provider: Kirsten Trinh MD   Consulting Provider: Pernell Greer MD  Primary Care Physician: Rubén Davis MD  Principal Problem:Left leg cellulitis    Patient information was obtained from patient, past medical records and ER records.     Inpatient consult to Cardiology  Consult performed by: Pernell Greer MD  Consult ordered by: Kirsten Trinh MD  Reason for consult: CHF        Subjective:     Chief Complaint:  Shortness of breath and leg swelling     HPI:   58-year-old male with history of hypertension, diabetes, hyperlipidemia, gout who comes in complaining of pain and swelling to his left leg.  Patient reports that for the past 3 days he has noticed some redness and swelling to the anterior shin area with occasional drainage.  His sister reports that yesterday it appeared that that swelling has improved but she noticed a blister on the bottom of his left foot.  That swelling has gotten significantly more pronounced over his foot so she brought him to the ER.  He reports that he has been unable to bear weight on his foot and leg today.  He reports subjective fevers and chills.  He denies any chest pain but reports that he is feeling more short of breath over the past few days.  He initially attributed the leg swelling to fluid as well as his shortness of breath. He does report dyspnea on exertion as well as orthopnea and PND.  He denies any focal weakness or numbness.  He denies any history of trauma to his leg.  No exacerbating or alleviating factors.  Patient also reports that he has been having right wrist pain for the past 3 days.  He denies any trauma to his wrist.  He reports that pain is over the 1st 3 fingers and is associated with numbness in those fingers.  He has not noted any  swelling. He denies any warmth or erythema in that area.  He does report full range of motion.    Previously followed by cardiology at [facility].  He underwent heart catheterization in 2016 by Dr. Magana.  He was found to have significant cardiomyopathy at that time. He was also found to have multivessel coronary artery disease that was too small for intervention.  CT surgery was consulted and he does not have any adequate targets for bypass.  There is no focal lesions amenable to PCI either on review of angiography.  He denies any current chest pain.  He gets shortness of breath on heavy activity but relieved with rest.  He has had no sustained tachycardia or palpitations.  He denies any PND, orthopnea unstable lower extremity edema currently.  He has not experienced any dizziness, presyncope or syncope.    Past Medical History:   Diagnosis Date    Arthritis     Diabetes mellitus     Diabetic retinopathy     Glaucoma     Gout     Hyperlipemia     Hypertension        Past Surgical History:   Procedure Laterality Date    AHMED GLAUCOMA IMPLANT Left 2011    DONE AT [facility]    BAERVELDT GLAUCOMA IMPLANT Left 2012    WITH CATARACT EXTRACTION//DONE AT [facility]    CATARACT EXTRACTION W/  INTRAOCULAR LENS IMPLANT Left 2012    WITH BAERVEDT//DONE AT [facility]    CATARACT EXTRACTION W/  INTRAOCULAR LENS IMPLANT Right 09/26/2018    COMPLEX ()    CB DESTRUCTION WITH CYCLO G6 Left 02/15/2017        CYST REMOVAL      HEART CATH-LEFT N/A 5/6/2016    Performed by Mike Magana MD at [facility] CATH LAB    INSERTION, IOL PROSTHESIS Right 9/26/2018    Performed by Perla Cortés MD at [facility] OR [address] [place] [address]    INTRAOCULAR PROSTHESES INSERTION Right 9/26/2018    Procedure: INSERTION, IOL PROSTHESIS;  Surgeon: Perla Cortés MD;  Location: [facility] OR [address] [place] [address];  Service: Ophthalmology;  Laterality: Right;    PHACOEMULSIFICATION OF CATARACT Right 9/26/2018    Procedure: PHACOEMULSIFICATION,  CATARACT;  Surgeon: Perla Cortés MD;  Location: [facility] OR [address] [place] [address];  Service: Ophthalmology;  Laterality: Right;    PHACOEMULSIFICATION, CATARACT Right 9/26/2018    [facility]    Right foot surgery  10/2014    TOE AMPUTATION      TONSILLECTOMY      TRABECULECTOMY/G 6 LASER Left 2/15/2017    [facility]       Review of patient's allergies indicates:   Allergen Reactions    Statins-hmg-coa reductase inhibitors      Generalized Pain    Onglyza [saxagliptin]     Penicillins Rash       No current facility-administered medications on file prior to encounter.      Current Outpatient Medications on File Prior to Encounter   Medication Sig    allopurinol (ZYLOPRIM) 100 MG tablet Take 2 tablets (200 mg total) by mouth once daily.    amLODIPine (NORVASC) 5 MG tablet TAKE 1 TABLET(5 MG) BY MOUTH EVERY DAY    aspirin 81 MG Chew Take 81 mg by mouth once daily.    benazepril (LOTENSIN) 40 MG tablet TAKE 1 TABLET(40 MG) BY MOUTH TWICE DAILY    brimonidine 0.1% (ALPHAGAN P) 0.1 % Drop Place 1 drop into both eyes 3 (three) times daily.    coenzyme Q10 100 mg capsule Take 100 mg by mouth every morning.    dorzolamide-timolol 2-0.5% (COSOPT) 22.3-6.8 mg/mL ophthalmic solution Place 1 drop into both eyes 3 (three) times daily.    ezetimibe (ZETIA) 10 mg tablet Take 1 tablet (10 mg total) by mouth once daily.    fish oil-omega-3 fatty acids 300-1,000 mg capsule Take 2 capsules (2 g total) by mouth 2 (two) times daily. (Patient taking differently: Take 1 g by mouth 2 (two) times daily. )    insulin aspart U-100 (NOVOLOG) 100 unit/mL InPn pen Inject 12 Units into the skin 3 (three) times daily with meals.    insulin glargine-lixisenatide (SOLIQUA 100/33) 100 unit-33 mcg/mL InPn Inject 34 units once daily    ketorolac 0.5% (ACULAR) 0.5 % Drop Place 1 drop into the right eye 3 (three) times daily.    MULTIVIT,THER IRON,CA,FA &  "MIN (MULTIVITAMIN) Tab Take 1 tablet by mouth every morning.     pen needle, diabetic 31 gauge x 1/4" Ndle Use as directed    prednisoLONE acetate (PRED FORTE) 1 % DrpS Place 1 drop into the right eye 3 (three) times daily.     Family History     Problem Relation (Age of Onset)    Diabetes Mother    Heart disease Brother    No Known Problems Father, Sister, Maternal Aunt, Maternal Uncle, Paternal Aunt, Paternal Uncle, Maternal Grandmother, Maternal Grandfather, Paternal Grandmother, Paternal Grandfather        Tobacco Use    Smoking status: Former Smoker     Packs/day: 1.00     Years: 3.00     Pack years: 3.00     Last attempt to quit: 1984     Years since quittin.2    Smokeless tobacco: Never Used   Substance and Sexual Activity    Alcohol use: Yes     Alcohol/week: 0.6 oz     Types: 1 Cans of beer per week     Comment: Occasional    Drug use: No    Sexual activity: Not Currently     Review of Systems   Constitution: Negative.   HENT: Negative.    Eyes: Negative.    Cardiovascular: Positive for dyspnea on exertion and leg swelling. Negative for chest pain, irregular heartbeat, near-syncope, orthopnea, palpitations, paroxysmal nocturnal dyspnea and syncope.   Respiratory: Negative for shortness of breath.    Skin: Negative.    Musculoskeletal: Positive for joint pain.   Gastrointestinal: Negative for abdominal pain, constipation and diarrhea.   Genitourinary: Negative for dysuria.   Neurological: Negative for dizziness.   Psychiatric/Behavioral: Negative.      Objective:     Vital Signs (Most Recent):  Temp: 97.3 °F (36.3 °C) (10/16/18 1143)  Pulse: 78 (10/16/18 1143)  Resp: 17 (10/16/18 1143)  BP: 123/81 (10/16/18 1143)  SpO2: (!) 94 % (10/16/18 1143) Vital Signs (24h Range):  Temp:  [97.3 °F (36.3 °C)-99 °F (37.2 °C)] 97.3 °F (36.3 °C)  Pulse:  [] 78  Resp:  [17-20] 17  SpO2:  [89 %-95 %] 94 %  BP: (106-141)/(57-81) 123/81     Weight: 96.2 kg (212 lb)  Body mass index is 29.57 " kg/m².    SpO2: (!) 94 %  O2 Device (Oxygen Therapy): room air      Intake/Output Summary (Last 24 hours) at 10/16/2018 1323  Last data filed at 10/16/2018 1245  Gross per 24 hour   Intake 240 ml   Output --   Net 240 ml       Lines/Drains/Airways     Peripheral Intravenous Line                 Peripheral IV - Single Lumen 10/14/18 0718 Left;Posterior Hand 2 days                Physical Exam   Constitutional: He is oriented to person, place, and time. He appears well-developed and well-nourished. No distress.   HENT:   Head: Normocephalic and atraumatic.   Eyes: Conjunctivae and EOM are normal. Pupils are equal, round, and reactive to light.   Neck: Normal range of motion. Neck supple. No thyromegaly present.   Cardiovascular: Normal rate, regular rhythm and normal heart sounds.   No murmur heard.  Pulmonary/Chest: Effort normal and breath sounds normal. No respiratory distress. He has no wheezes. He has no rales. He exhibits no tenderness.   Abdominal: Soft. Bowel sounds are normal.   Musculoskeletal: He exhibits edema.   Neurological: He is alert and oriented to person, place, and time.   Skin: Skin is warm and dry.   Psychiatric: He has a normal mood and affect. His behavior is normal.       Significant Labs:   CMP   Recent Labs   Lab  10/15/18   0528  10/16/18   0350   NA  135*  133*   K  4.1  4.4   CL  101  101   CO2  22*  23   GLU  80  61*   BUN  48*  43*   CREATININE  0.9  1.0   CALCIUM  9.0  8.9   PROT  6.7  6.5   ALBUMIN  2.5*  2.3*   BILITOT  3.1*  2.1*   ALKPHOS  228*  214*   AST  62*  48*   ALT  78*  62*   ANIONGAP  12  9   ESTGFRAFRICA  >60  >60   EGFRNONAA  >60  >60   , CBC No results for input(s): WBC, HGB, HCT, PLT in the last 48 hours., INR No results for input(s): INR, PROTIME in the last 48 hours., Lipid Panel No results for input(s): CHOL, HDL, LDLCALC, TRIG, CHOLHDL in the last 48 hours. and Troponin No results for input(s): TROPONINI in the last 48 hours.    Significant Imaging: Echocardiogram:    2D echo with color flow doppler:   Results for orders placed or performed during the hospital encounter of 10/12/16   2D Echo w/ Color Flow Doppler   Result Value Ref Range    EF 35 (A) 55 - 65    Mitral Valve Regurgitation MILD     Diastolic Dysfunction Yes (A)     Est. PA Systolic Pressure 54 (A)     Pericardial Effusion SMALL (A)     Tricuspid Valve Regurgitation MILD      LHC:  2016 by Dr. Chino GUTIERREZ Summary/Post-Operative Diagnosis       Three vessel coronary artery disease.    LV systolic and diastolic dysfunction.    D. Hemodynamic Results     LVEDP (Pre): 25 mmHg  LVEDP (Post): 30 mmHg  Ejection Fraction: 35%    E. Angiographic Results     Diagnostic:          Patient has a right dominant coronary artery.        - Left Main Coronary Artery:             The LM has luminal irregularities. There is BAKARI 3 flow.     - Left Anterior Descending Artery:             The proximal LAD has a 80% stenosis. There is BAKARI 3 flow.             The mid LAD has a 90% stenosis. There is BAKARI 3 flow. The remaining portion of the vessel is diffusely diseased.     - Left Circumflex Artery:             The mid LCX has a 60% stenosis. There is BAKARI 3 flow. The remaining portion of the vessel has luminal irregularities.     - Right Coronary Artery:             The proximal RCA has a 60% stenosis. There is BAKARI 3 flow.             The mid RCA has a 80% stenosis. There is BAKARI 3 flow. The remaining portion of the vessel has luminal irregularities.     - Posterior Descending Artery:             The proximal PDA has a 60% stenosis. There is BAKARI 3 flow. The remaining portion of the vessel has luminal irregularities.     - Radial:             The radial.      Assessment and Plan:     * Left leg cellulitis    Per IM        Chronic combined systolic and diastolic heart failure    Ischemic in nature  Encourage compliance with GDMT  Consider ICD as an outpatient        CAD (coronary artery disease)    Known diffuse triple-vessel disease  which is small vessel and appears not a candidate for intervention percutaneous or surgical  No functional ischemic assessment on file, plan for NST in a.m.  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 I will follow-up with patient. Please contact us if you have any additional questions.    Pernell Greer MD  Cardiology   [facility]

## 2018-10-16 NOTE — ASSESSMENT & PLAN NOTE
-LLE skin changes and cellulitis likely due to chronic venous hypertension and diabetes - recommend compression, elevation, dietary changes associated with water and sodium intake discussed at length with patient  -Rec wound care consult to improve skin integrity and protect skin  -Once cellulitis resolved, will evaluate outpatient for LLE revascularization (scheduled)

## 2018-10-16 NOTE — NURSING
Spoken with  Lab staff (Paige) in regards to lab order for (body fluid cell count) this writer was informed by  Lab staff that the lab did not have enough body fluid specimen to conduct the test. Will pass on to  Day shift nurse to  Inform incoming physician.

## 2018-10-16 NOTE — SUBJECTIVE & OBJECTIVE
Interval history:  No acute events. No new complaints. Blister on the left foot expanding.    Review of Systems   Constitutional: Negative for chills and fever.   HENT: Negative for congestion and rhinorrhea.    Eyes: Negative for photophobia and visual disturbance.   Respiratory: Negative for cough and shortness of breath.    Cardiovascular: Positive for leg swelling. Negative for chest pain.   Gastrointestinal: Negative for abdominal pain, constipation, diarrhea and nausea.   Genitourinary: Negative for difficulty urinating and dysuria.   Musculoskeletal: Positive for arthralgias. Negative for gait problem and joint swelling.   Skin: Positive for color change and wound.   Neurological: Negative for dizziness and light-headedness.   Psychiatric/Behavioral: Negative for confusion and dysphoric mood.     Objective:     Vital Signs (Most Recent):  Temp: 97.8 °F (36.6 °C) (10/15/18 1934)  Pulse: 110 (10/15/18 1934)  Resp: 18 (10/15/18 1934)  BP: 127/74 (10/15/18 1934)  SpO2: (!) 90 % (10/15/18 1934) Vital Signs (24h Range):  Temp:  [97.6 °F (36.4 °C)-99 °F (37.2 °C)] 97.8 °F (36.6 °C)  Pulse:  [] 110  Resp:  [17-18] 18  SpO2:  [90 %-96 %] 90 %  BP: ()/(62-76) 127/74     Weight: 96.2 kg (212 lb)  Body mass index is 29.57 kg/m².    Physical Exam   Constitutional: He is oriented to person, place, and time. He appears well-developed and well-nourished. No distress.   HENT:   Right Ear: External ear normal.   Left Ear: External ear normal.   Nose: Nose normal.   Mouth/Throat: Oropharynx is clear and moist.   Eyes: Conjunctivae and EOM are normal. Pupils are equal, round, and reactive to light. No scleral icterus.   Neck: Normal range of motion. Neck supple. No thyromegaly present.   Cardiovascular: Normal rate and normal heart sounds. Exam reveals no friction rub.   No murmur heard.  Pulmonary/Chest: Effort normal and breath sounds normal. No respiratory distress. He has no wheezes. He has no rales.   Abdominal:  Soft. Bowel sounds are normal. He exhibits no mass. There is no tenderness.   Obese   Musculoskeletal: He exhibits deformity. He exhibits no edema.   Bilateral hands with synovitis particularly of the PIPs right much worse than left and right medial MCPs. Limited ROM in the hands and wrist bilaterally.  Right great and second toe amputated, well healed.   Neurological: He is alert and oriented to person, place, and time. No cranial nerve deficit.   Skin: Skin is warm and dry. No pallor.   Left lower extremity wrapped with gauze with yellow drainage seeping through bandage.   Psychiatric: He has a normal mood and affect. His behavior is normal. Thought content normal.         CRANIAL NERVES     CN III, IV, VI   Pupils are equal, round, and reactive to light.  Extraocular motions are normal.         Significant Labs: All pertinent labs within the past 24 hours have been reviewed.    Significant Imaging: I have reviewed and interpreted all pertinent imaging results/findings within the past 24 hours.

## 2018-10-16 NOTE — ASSESSMENT & PLAN NOTE
He was noted to have abnormal LFT and US abdomen showed HSM. GI consulted. Negative HCV in 2015. Plan for Dx para on not conclusive for portal HTN. Cell count/diff pending.

## 2018-10-16 NOTE — ASSESSMENT & PLAN NOTE
Wound care following. Worsened blistering on 10/15. Monitor again overnight. He will need aggressive wound care after discharge.

## 2018-10-16 NOTE — PROGRESS NOTES
The patient denies having any new issues today.    Vitals:    10/15/18 1705 10/15/18 1934 10/15/18 2343 10/16/18 0341   BP: 125/76 127/74 (!) 106/57 (!) 141/80   BP Location: Right arm Left arm Left arm Left arm   Patient Position: Sitting Lying Lying Sitting   Pulse: 95 110 81 89   Resp: 17 18 18 20   Temp: 99 °F (37.2 °C) 97.8 °F (36.6 °C) 97.8 °F (36.6 °C) 97.5 °F (36.4 °C)   TempSrc: Oral Oral Oral Oral   SpO2: (!) 93% (!) 90% (!) 90% (!) 89%   Weight:       Height:          allopurinol  200 mg Oral Daily    amLODIPine  5 mg Oral Daily    aspirin  81 mg Oral Daily    benazepril  40 mg Oral Daily    brimonidine 0.1%  1 drop Both Eyes TID    cefTRIAXone (ROCEPHIN) IVPB  2 g Intravenous Q24H    dorzolamide-timolol 2-0.5%  1 drop Both Eyes TID    ezetimibe  10 mg Oral Daily    furosemide  40 mg Oral Daily    heparin (porcine)  5,000 Units Subcutaneous Q8H    insulin detemir U-100  20 Units Subcutaneous QHS    ketorolac 0.5%  1 drop Right Eye TID    multivit-iron-FA-calcium-mins  1 tablet Oral QAM    prednisoLONE acetate  1 drop Right Eye TID    spironolactone  100 mg Oral Daily     P.E.:  GEN: A x O x 3, NAD  HEENT: EOMI, PERRL, anicteric sclera  CV: RRR, no M/R/G  Chest: CTA B  Abdomen: tight, nontender, distended, normoactive BS  Ext: No C/C/E. 2+ dorsalis pedis pulses B    Labs:    Recent Results (from the past 336 hour(s))   CBC auto differential    Collection Time: 10/14/18  4:59 AM   Result Value Ref Range    WBC 12.11 3.90 - 12.70 K/uL    Hemoglobin 15.0 14.0 - 18.0 g/dL    Hematocrit 42.4 40.0 - 54.0 %    Platelets 187 150 - 350 K/uL   CBC auto differential    Collection Time: 10/13/18  5:11 AM   Result Value Ref Range    WBC 12.88 (H) 3.90 - 12.70 K/uL    Hemoglobin 13.7 (L) 14.0 - 18.0 g/dL    Hematocrit 39.1 (L) 40.0 - 54.0 %    Platelets 194 150 - 350 K/uL   CBC auto differential    Collection Time: 10/12/18  5:13 AM   Result Value Ref Range    WBC 12.24 3.90 - 12.70 K/uL    Hemoglobin  13.3 (L) 14.0 - 18.0 g/dL    Hematocrit 38.9 (L) 40.0 - 54.0 %    Platelets 179 150 - 350 K/uL     CMP  Sodium   Date Value Ref Range Status   10/16/2018 133 (L) 136 - 145 mmol/L Final     Potassium   Date Value Ref Range Status   10/16/2018 4.4 3.5 - 5.1 mmol/L Final     Chloride   Date Value Ref Range Status   10/16/2018 101 95 - 110 mmol/L Final     CO2   Date Value Ref Range Status   10/16/2018 23 23 - 29 mmol/L Final     Glucose   Date Value Ref Range Status   10/16/2018 61 (L) 70 - 110 mg/dL Final     BUN, Bld   Date Value Ref Range Status   10/16/2018 43 (H) 6 - 20 mg/dL Final     Creatinine   Date Value Ref Range Status   10/16/2018 1.0 0.5 - 1.4 mg/dL Final     Calcium   Date Value Ref Range Status   10/16/2018 8.9 8.7 - 10.5 mg/dL Final     Total Protein   Date Value Ref Range Status   10/16/2018 6.5 6.0 - 8.4 g/dL Final     Albumin   Date Value Ref Range Status   10/16/2018 2.3 (L) 3.5 - 5.2 g/dL Final     Total Bilirubin   Date Value Ref Range Status   10/16/2018 2.1 (H) 0.1 - 1.0 mg/dL Final     Comment:     For infants and newborns, interpretation of results should be based  on gestational age, weight and in agreement with clinical  observations.  Premature Infant recommended reference ranges:  Up to 24 hours.............<8.0 mg/dL  Up to 48 hours............<12.0 mg/dL  3-5 days..................<15.0 mg/dL  6-29 days.................<15.0 mg/dL       Alkaline Phosphatase   Date Value Ref Range Status   10/16/2018 214 (H) 55 - 135 U/L Final     AST   Date Value Ref Range Status   10/16/2018 48 (H) 10 - 40 U/L Final     ALT   Date Value Ref Range Status   10/16/2018 62 (H) 10 - 44 U/L Final     Anion Gap   Date Value Ref Range Status   10/16/2018 9 8 - 16 mmol/L Final     eGFR if    Date Value Ref Range Status   10/16/2018 >60 >60 mL/min/1.73 m^2 Final     eGFR if non    Date Value Ref Range Status   10/16/2018 >60 >60 mL/min/1.73 m^2 Final     Comment:     Calculation  used to obtain the estimated glomerular filtration  rate (eGFR) is the CKD-EPI equation.          No results for input(s): PT, INR, APTT in the last 24 hours.  Ultrasound:  Hepatosplenomegaly, ascites, and right pleural effusion.  No liver mass or biliary ductal dilatation.  Nonspecific gallbladder wall thickening, which may relate to underlying hepatic dysfunction    A/P:  The patient is a 58 year old man with a history of HTN, DM, and gout presenting with right hand pain with cellulitis, new onset ascites, and lower extremity swelling.  1.  Ascites - the patient appears to have cirrhosis.  He rarely drinks alcohol and he does not use intravenous drugs, have tattoos, or previously received blood transfusions.  A hepatitis panel is negative.  An RAYMOND level and iron studies were normal.  An anti-smooth muscle antibody, anti-LKM, and alpha 1 antitrypsin level are pending.  An IgG level and ceruloplasmin are normal.  If an etiology is not found, he may need a liver biopsy.  The patient may have MENEZES cirrhosis.  A paracentesis was performed and the SAAG is 0.9 with a total protein of 2.9 suggesting the etiology may be pancreatic, tuberculosis, or an occult malignancy as his creatinine is normal.  As it was suspected that this was hepatic in etiology and only a diagnostic paracentesis was performed with his abdomen still being tight, he can undergo a therapeutic paracentesis today with studies recent to confirm them.  A low salt, low fluid diet was emphasized.  Diuretics were started.  He would eventually benefit from an EGD for variceal screening if he has cirrhosis.  The patient wishes to be discharged after the repeat paracentesis.

## 2018-10-16 NOTE — PLAN OF CARE
Problem: Patient Care Overview  Goal: Plan of Care Review   10/15/18 1703   Coping/Psychosocial   Plan Of Care Reviewed With patient       Problem: Fall Risk (Adult)  Goal: Identify Related Risk Factors and Signs and Symptoms  Related risk factors and signs and symptoms are identified upon initiation of Human Response Clinical Practice Guideline (CPG)   10/16/18 1015   Fall Risk   Related Risk Factors (Fall Risk) environment unfamiliar   Signs and Symptoms (Fall Risk) presence of risk factors       Problem: Skin and Soft Tissue Infection (Adult)  Goal: Signs and Symptoms of Listed Potential Problems Will be Absent, Minimized or Managed (Skin and Soft Tissue Infection)  Signs and symptoms of listed potential problems will be absent, minimized or managed by discharge/transition of care (reference Skin and Soft Tissue Infection (Adult) CPG).   10/16/18 1015   Skin and Soft Tissue Infection   Problems Assessed (Skin and Soft Tissue Infection) all   Problems Present (Skin and Soft Tissue Infection) pain

## 2018-10-16 NOTE — HPI
58-year-old male with history of hypertension, diabetes, hyperlipidemia, gout who comes in complaining of pain and swelling to his left leg.  Patient reports that for the past 3 days he has noticed some redness and swelling to the anterior shin area with occasional drainage.  His sister reports that yesterday it appeared that that swelling has improved but she noticed a blister on the bottom of his left foot.  That swelling has gotten significantly more pronounced over his foot so she brought him to the ER.  He reports that he has been unable to bear weight on his foot and leg today.  He reports subjective fevers and chills.  He denies any chest pain but reports that he is feeling more short of breath over the past few days.  He initially attributed the leg swelling to fluid as well as his shortness of breath. He does report dyspnea on exertion as well as orthopnea and PND.  He denies any focal weakness or numbness.  He denies any history of trauma to his leg.  No exacerbating or alleviating factors.  Patient also reports that he has been having right wrist pain for the past 3 days.  He denies any trauma to his wrist.  He reports that pain is over the 1st 3 fingers and is associated with numbness in those fingers.  He has not noted any swelling. He denies any warmth or erythema in that area.  He does report full range of motion.    Previously followed by cardiology at [facility].  He underwent heart catheterization in 2016 by Dr. Magana.  He was found to have significant cardiomyopathy at that time. He was also found to have multivessel coronary artery disease that was too small for intervention.  CT surgery was consulted and he does not have any adequate targets for bypass.  There is no focal lesions amenable to PCI either on review of angiography.  He denies any current chest pain.  He gets shortness of breath on heavy activity but relieved with rest.  He has had no sustained tachycardia or palpitations.  He  denies any PND, orthopnea unstable lower extremity edema currently.  He has not experienced any dizziness, presyncope or syncope.

## 2018-10-17 VITALS
HEIGHT: 71 IN | BODY MASS INDEX: 29.68 KG/M2 | HEART RATE: 70 BPM | TEMPERATURE: 98 F | WEIGHT: 212 LBS | DIASTOLIC BLOOD PRESSURE: 63 MMHG | OXYGEN SATURATION: 95 % | RESPIRATION RATE: 18 BRPM | SYSTOLIC BLOOD PRESSURE: 108 MMHG

## 2018-10-17 LAB
ALBUMIN SERPL BCP-MCNC: 2.5 G/DL
ALP SERPL-CCNC: 188 U/L
ALT SERPL W/O P-5'-P-CCNC: 47 U/L
ANION GAP SERPL CALC-SCNC: 9 MMOL/L
AST SERPL-CCNC: 36 U/L
BILIRUB SERPL-MCNC: 1.8 MG/DL
BUN SERPL-MCNC: 46 MG/DL
CALCIUM SERPL-MCNC: 8.5 MG/DL
CHLORIDE SERPL-SCNC: 103 MMOL/L
CO2 SERPL-SCNC: 22 MMOL/L
CREAT SERPL-MCNC: 1.2 MG/DL
CV STRESS BASE HR: 62
DIASTOLIC BLOOD PRESSURE: 60
EST. GFR  (AFRICAN AMERICAN): >60 ML/MIN/1.73 M^2
EST. GFR  (NON AFRICAN AMERICAN): >60 ML/MIN/1.73 M^2
GLUCOSE SERPL-MCNC: 108 MG/DL
NUC STRESS EJECTION FRACTION: 17 %
OHS CV CPX 85 PERCENT MAX PREDICTED HEART RATE MALE: 136
OHS CV CPX MAX PREDICTED HEART RATE: 160
OHS CV CPX PATIENT IS FEMALE: 0
OHS CV CPX PATIENT IS MALE: 1
OHS CV CPX PEAK DIASTOLIC BLOOD PRESSURE: 65 MMHG
OHS CV CPX PEAK HEAR RATE: 64
OHS CV CPX PEAK RATE PRESSURE PRODUCT: 9856
OHS CV CPX PEAK SYSTOLIC BLOOD PRESSURE: 154
OHS CV CPX PERCENT MAX PREDICTED HEART RATE ACHIEVED: 40
OHS CV CPX RATE PRESSURE PRODUCT PRESENTING: 8618
POCT GLUCOSE: 106 MG/DL (ref 70–110)
POCT GLUCOSE: 117 MG/DL (ref 70–110)
POCT GLUCOSE: 62 MG/DL (ref 70–110)
POCT GLUCOSE: 72 MG/DL (ref 70–110)
POCT GLUCOSE: 86 MG/DL (ref 70–110)
POTASSIUM SERPL-SCNC: 3.9 MMOL/L
PROT SERPL-MCNC: 6.1 G/DL
SODIUM SERPL-SCNC: 134 MMOL/L
SYSTOLIC BLOOD PRESSURE: 139

## 2018-10-17 PROCEDURE — 63600175 PHARM REV CODE 636 W HCPCS

## 2018-10-17 PROCEDURE — 63600175 PHARM REV CODE 636 W HCPCS: Performed by: INTERNAL MEDICINE

## 2018-10-17 PROCEDURE — 63600175 PHARM REV CODE 636 W HCPCS: Performed by: EMERGENCY MEDICINE

## 2018-10-17 PROCEDURE — 25000003 PHARM REV CODE 250: Performed by: INTERNAL MEDICINE

## 2018-10-17 PROCEDURE — 80053 COMPREHEN METABOLIC PANEL: CPT

## 2018-10-17 PROCEDURE — 36415 COLL VENOUS BLD VENIPUNCTURE: CPT

## 2018-10-17 RX ORDER — CARVEDILOL 6.25 MG/1
6.25 TABLET ORAL 2 TIMES DAILY
Qty: 60 TABLET | Refills: 11 | Status: ON HOLD | OUTPATIENT
Start: 2018-10-17 | End: 2018-12-19 | Stop reason: HOSPADM

## 2018-10-17 RX ORDER — REGADENOSON 0.08 MG/ML
INJECTION, SOLUTION INTRAVENOUS
Status: COMPLETED
Start: 2018-10-17 | End: 2018-10-17

## 2018-10-17 RX ADMIN — ACETAMINOPHEN 650 MG: 325 TABLET ORAL at 04:10

## 2018-10-17 RX ADMIN — FUROSEMIDE 40 MG: 40 TABLET ORAL at 03:10

## 2018-10-17 RX ADMIN — EZETIMIBE 10 MG: 10 TABLET ORAL at 03:10

## 2018-10-17 RX ADMIN — REGADENOSON: 0.08 INJECTION, SOLUTION INTRAVENOUS at 12:10

## 2018-10-17 RX ADMIN — MULTIPLE VITAMINS W/ MINERALS TAB 1 TABLET: TAB at 05:10

## 2018-10-17 RX ADMIN — BENAZEPRIL HYDROCHLORIDE 40 MG: 40 TABLET, COATED ORAL at 03:10

## 2018-10-17 RX ADMIN — BRIMONIDINE TARTRATE 1 DROP: 1 SOLUTION/ DROPS OPHTHALMIC at 09:10

## 2018-10-17 RX ADMIN — HEPARIN SODIUM 5000 UNITS: 5000 INJECTION, SOLUTION INTRAVENOUS; SUBCUTANEOUS at 05:10

## 2018-10-17 RX ADMIN — DEXTROSE MONOHYDRATE 12.5 G: 25 INJECTION, SOLUTION INTRAVENOUS at 09:10

## 2018-10-17 RX ADMIN — KETOROLAC TROMETHAMINE 1 DROP: 5 SOLUTION/ DROPS OPHTHALMIC at 03:10

## 2018-10-17 RX ADMIN — DORZOLAMIDE HYDROCHLORIDE AND TIMOLOL MALEATE 1 DROP: 20; 5 SOLUTION/ DROPS OPHTHALMIC at 03:10

## 2018-10-17 RX ADMIN — PREDNISOLONE ACETATE 1 DROP: 10 SUSPENSION/ DROPS OPHTHALMIC at 09:10

## 2018-10-17 RX ADMIN — HEPARIN SODIUM 5000 UNITS: 5000 INJECTION, SOLUTION INTRAVENOUS; SUBCUTANEOUS at 03:10

## 2018-10-17 RX ADMIN — AMLODIPINE BESYLATE 5 MG: 5 TABLET ORAL at 03:10

## 2018-10-17 RX ADMIN — SPIRONOLACTONE 100 MG: 100 TABLET, FILM COATED ORAL at 03:10

## 2018-10-17 RX ADMIN — KETOROLAC TROMETHAMINE 1 DROP: 5 SOLUTION/ DROPS OPHTHALMIC at 09:10

## 2018-10-17 RX ADMIN — CEFTRIAXONE 2 G: 2 INJECTION, SOLUTION INTRAVENOUS at 04:10

## 2018-10-17 RX ADMIN — ACETAMINOPHEN 650 MG: 325 TABLET ORAL at 03:10

## 2018-10-17 RX ADMIN — ALLOPURINOL 200 MG: 100 TABLET ORAL at 03:10

## 2018-10-17 RX ADMIN — DORZOLAMIDE HYDROCHLORIDE AND TIMOLOL MALEATE 1 DROP: 20; 5 SOLUTION/ DROPS OPHTHALMIC at 09:10

## 2018-10-17 RX ADMIN — CARVEDILOL 6.25 MG: 6.25 TABLET, FILM COATED ORAL at 03:10

## 2018-10-17 RX ADMIN — PREDNISOLONE ACETATE 1 DROP: 10 SUSPENSION/ DROPS OPHTHALMIC at 03:10

## 2018-10-17 RX ADMIN — ASPIRIN 81 MG 81 MG: 81 TABLET ORAL at 03:10

## 2018-10-17 RX ADMIN — BRIMONIDINE TARTRATE 1 DROP: 1 SOLUTION/ DROPS OPHTHALMIC at 03:10

## 2018-10-17 NOTE — ASSESSMENT & PLAN NOTE
He was noted to have abnormal LFT and US abdomen showed HSM. GI consulted. Negative HCV in 2015. Paracentesis attempted but no pocket large enough to collect more than 3cc. SAAG 0.9 which isn't really consistent with portal hypertension. He has a significant amount of soft tissue edema which is more likely related to his hypoalbuminemia and heart failure. Further work up for HSM pending. Will need to follow up with GI as outpatient.

## 2018-10-17 NOTE — PROGRESS NOTES
[facility]  Progress Note    Patient Name: Marvin Ray  MRN: [id number]  Patient Class: IP- Inpatient   Admission Date: 10/10/2018  Length of Stay: 6 days  Attending Physician: Kirsten Trinh MD  Primary Care Provider: Rubén Davis MD        Subjective:     Principal Problem:Left leg cellulitis    HPI:  Mr. Ray is a pleasant 57 yo man with IDDM (last A1c >14% 7/2018), essential HTN, HLD, gout, former tobacco abuse, and PVD with remote resection of the right 1st and 2nd toe [facility]. He has chronic right hand pain from arthritis but he felt the pain had gotten much worse in the past 2-3 days. He describes a shooting, aching pain, particularly through his thumb, 2nd, and 3rd fingers. He has limited ROM due to arthritis but reports that this is not new. He also has limited wrist flexion and extension.   In the [facility] he was noted to have concerning findings of his left lower extremity. He has chronic woody appearance and hyperpigmentation of the skin on his LLE, but in the past couple of days the skin has been more erythematous. He also notes new edema of the LLE as well as a new blister on the dorsum of his foot. He had decreased pulses though they could be found with doppler.     Hospital Course:  Mr. Ray was admitted due to concern for LLE cellulitis. Per the [place] [facility] physician, she found his exam to be concerning for necrotizing fasciitis, however upon my exam I had very low clinical concern for this. He did appear to have chronic venous insufficiency and likely PVD. Vascular was consulted and thought his peripheral blood flow was sufficient without current need for intervention.  He had chronic hyperpigmentation of his LLE c/w chronic venous stasis with an overlying area of erythema and calor concerning for cellulitis. He was started on empiric antibiotics with meropenam and vancomycin (penicillin allergic). He  did have a slightly elevated lactate that remained stable. Blood cultures grew group G Strep. Antibiotics narrowed to ceftriaxone with final susceptibility results on 10/13/18. Wound care followed during admission. His leg cellulitis appeared to improve with antibiotics, but he did have a large blister on his foot that continued to expand. He will need aggressive wound care after discharge. The wound was worsening again on 10/15; the LLE was wrapped with coban and re-evaluated the following morning. His wounds were stable - further wound care recs provided.  He was found to have slightly elevated LFTs during admission. Abdominal US with hepatosplenomegaly, ascites, and a right pleural effusion. GI consulted. Dx paracentesis on 10/15/18 w/ SAAG 0.9 and not enough fluid able to be collected for cell count. The amount of ascites was not enough to provide sufficient sample. His soft tissue edema was more substantial. He has a history of heart failure. Repeat echo showed EF 25% and G3DD. Cardiology was consulted. NST 10/17/2018.    Interval history:  No acute events. No new complaints.    Review of Systems   Constitutional: Negative for chills and fever.   HENT: Negative for congestion and rhinorrhea.    Respiratory: Negative for cough and shortness of breath.    Cardiovascular: Positive for leg swelling. Negative for chest pain.   Gastrointestinal: Negative for abdominal pain and nausea.   Musculoskeletal: Positive for arthralgias. Negative for gait problem.   Skin: Positive for color change and wound.   Neurological: Negative for dizziness and light-headedness.   Psychiatric/Behavioral: Negative for confusion and dysphoric mood.     Objective:     Vital Signs (Most Recent):  Temp: 97.6 °F (36.4 °C) (10/16/18 1942)  Pulse: 74 (10/16/18 1942)  Resp: 18 (10/16/18 1942)  BP: (!) 98/57 (10/16/18 1942)  SpO2: (!) 90 % (10/16/18 1942) Vital Signs (24h Range):  Temp:  [97.3 °F (36.3 °C)-97.8 °F (36.6 °C)] 97.6 °F (36.4  °C)  Pulse:  [74-97] 74  Resp:  [17-20] 18  SpO2:  [89 %-95 %] 90 %  BP: ()/(57-80) 98/57     Weight: 96.2 kg (212 lb)  Body mass index is 29.57 kg/m².    Physical Exam   Constitutional: He is oriented to person, place, and time. He appears well-developed and well-nourished. No distress.   HENT:   Right Ear: External ear normal.   Left Ear: External ear normal.   Nose: Nose normal.   Mouth/Throat: Oropharynx is clear and moist.   Eyes: EOM are normal. Pupils are equal, round, and reactive to light.   Neck: Normal range of motion. Neck supple. No thyromegaly present.   Cardiovascular: Normal rate and normal heart sounds. Exam reveals no friction rub.   No murmur heard.  Pulmonary/Chest: Effort normal and breath sounds normal. No respiratory distress. He has no wheezes. He has no rales.   Abdominal: Soft. Bowel sounds are normal. He exhibits no mass. There is no tenderness.   Obese   Musculoskeletal: He exhibits deformity. He exhibits no edema.   Right hand in glove  Right great and second toe amputated, well healed.   Neurological: He is alert and oriented to person, place, and time. No cranial nerve deficit.   Skin: No pallor.   Left lower extremity wrapped with coban.   Psychiatric: He has a normal mood and affect. His behavior is normal. Thought content normal.         CRANIAL NERVES     CN III, IV, VI   Pupils are equal, round, and reactive to light.  Extraocular motions are normal.         Significant Labs: All pertinent labs within the past 24 hours have been reviewed.    Significant Imaging: I have reviewed and interpreted all pertinent imaging results/findings within the past 24 hours.    Assessment/Plan:      * Left leg cellulitis    LLE concerning for cellulitis. Blood cultures with Strep C which is unlikely to be a contaminant. Wound care consulted. Treated empirically with meropenem and vanc. Vanc stopped as meropenam would cover Strep. Antibiotics narrowed to ceftriaxone with culture resulted.  "Appreciate wound care recs. Plan to DC to continue a course of antibiotics with PO Keflex.        Chronic combined systolic and diastolic heart failure    H/o CHF. Echo now with EF 25%, G3DD. Cards optimized medications. Known CAD.        Stage 3 chronic kidney disease    Renal function improved during admission. Does not appear to have significant renal losses of protein.        Hepatosplenomegaly    He was noted to have abnormal LFT and US abdomen showed HSM. GI consulted. Negative HCV in 2015. Paracentesis attempted but no pocket large enough to collect more than 3cc. SAAG 0.9 which isn't really consistent with portal hypertension. He has a significant amount of soft tissue edema which is more likely related to his hypoalbuminemia and heart failure. Further work up for HSM pending. Will need to follow up with GI as outpatient.        Multiple open wounds of lower leg    Wound care following. Worsened blistering on 10/15. Monitor again overnight and improved with coban wrap. He will need aggressive wound care after discharge.        Right wrist pain    Acute on chronic right wrist pain. Xrays showed "significant abnormality with resorption or resection involving the majority of the lunate". Ortho recs appreciated. Follow up in clinic.        PVD (peripheral vascular disease)    Given his tobacco history, previous poor wound healing, calcifications seen on Xray, and skin findings, PVD highly suspected. Vascular consulted, recs appreciated. Will follow up as outpatient once cellulitis is improved.        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 NST 10/17. Optimize medications. Cards recs appreciated.        Essential " hypertension    Continue home amlodipine and benazepril. PRN clonidine available.        Tophaceous gout    Continue home allopurinol. Will need follow up as outpatient. He has progressive deformities of his hands. Does not appear to have an acute flare. Appreciate ortho recs.        Neovascular glaucoma of both eyes    Continue eye drops.        HLD (hyperlipidemia)    Continue Zetia.        DM type 2 with diabetic peripheral neuropathy    Continue basal/bolus insulin. Adjust as needed. He has been seen in endocrine clinic. Continue to follow up with them as outpatient.          VTE Risk Mitigation (From admission, onward)        Ordered     heparin (porcine) injection 5,000 Units  Every 8 hours      10/10/18 1710     IP VTE HIGH RISK PATIENT  Once      10/10/18 1710              Kirsten Trinh MD  Department of Hospital Medicine   [facility]

## 2018-10-17 NOTE — PROGRESS NOTES
SW sent referral to HH agencies in providers kevin.    [facility]    10:45am.    Pt accepted by [facility]. Home Health placed on AVS.

## 2018-10-17 NOTE — PLAN OF CARE
10/17/18 1608   Final Note   Assessment Type Final Discharge Note   Discharge Disposition Home-Health   What phone number can be called within the next 1-3 days to see how you are doing after discharge? [phone]   Hospital Follow Up  Appt(s) scheduled? Yes   Discharge plans and expectations educations in teach back method with documentation complete? Yes   Right Care Referral Info   Referral Type Home Care   [place] [address]

## 2018-10-17 NOTE — PROGRESS NOTES
The patient denies having any new issues today.    Vitals:    10/16/18 1942 10/16/18 2015 10/16/18 2337 10/17/18 0325   BP: (!) 98/57 116/61 102/66 96/61   BP Location: Left arm Right arm Left arm Left arm   Patient Position: Lying Sitting Lying Lying   Pulse: 74 78 75 71   Resp: 18  20 20   Temp: 97.6 °F (36.4 °C)  97.8 °F (36.6 °C) 97.7 °F (36.5 °C)   TempSrc: Oral  Oral Oral   SpO2: (!) 90%  (!) 93% 95%   Weight:       Height:          allopurinol  200 mg Oral Daily    amLODIPine  5 mg Oral Daily    aspirin  81 mg Oral Daily    benazepril  40 mg Oral Daily    brimonidine 0.1%  1 drop Both Eyes TID    carvedilol  6.25 mg Oral BID    cefTRIAXone (ROCEPHIN) IVPB  2 g Intravenous Q24H    dorzolamide-timolol 2-0.5%  1 drop Both Eyes TID    ezetimibe  10 mg Oral Daily    furosemide  40 mg Oral Daily    heparin (porcine)  5,000 Units Subcutaneous Q8H    insulin detemir U-100  23 Units Subcutaneous QHS    ketorolac 0.5%  1 drop Right Eye TID    multivit-iron-FA-calcium-mins  1 tablet Oral QAM    prednisoLONE acetate  1 drop Right Eye TID    spironolactone  100 mg Oral Daily     P.E.:  GEN: A x O x 3, NAD  HEENT: EOMI, PERRL, anicteric sclera  CV: RRR, no M/R/G  Chest: CTA B  Abdomen: tight, nontender, distended, normoactive BS  Ext: No C/C/E. 2+ dorsalis pedis pulses B    Labs:    Recent Results (from the past 336 hour(s))   CBC auto differential    Collection Time: 10/14/18  4:59 AM   Result Value Ref Range    WBC 12.11 3.90 - 12.70 K/uL    Hemoglobin 15.0 14.0 - 18.0 g/dL    Hematocrit 42.4 40.0 - 54.0 %    Platelets 187 150 - 350 K/uL   CBC auto differential    Collection Time: 10/13/18  5:11 AM   Result Value Ref Range    WBC 12.88 (H) 3.90 - 12.70 K/uL    Hemoglobin 13.7 (L) 14.0 - 18.0 g/dL    Hematocrit 39.1 (L) 40.0 - 54.0 %    Platelets 194 150 - 350 K/uL   CBC auto differential    Collection Time: 10/12/18  5:13 AM   Result Value Ref Range    WBC 12.24 3.90 - 12.70 K/uL    Hemoglobin 13.3 (L)  14.0 - 18.0 g/dL    Hematocrit 38.9 (L) 40.0 - 54.0 %    Platelets 179 150 - 350 K/uL     CMP  Sodium   Date Value Ref Range Status   10/17/2018 134 (L) 136 - 145 mmol/L Final     Potassium   Date Value Ref Range Status   10/17/2018 3.9 3.5 - 5.1 mmol/L Final     Chloride   Date Value Ref Range Status   10/17/2018 103 95 - 110 mmol/L Final     CO2   Date Value Ref Range Status   10/17/2018 22 (L) 23 - 29 mmol/L Final     Glucose   Date Value Ref Range Status   10/17/2018 108 70 - 110 mg/dL Final     BUN, Bld   Date Value Ref Range Status   10/17/2018 46 (H) 6 - 20 mg/dL Final     Creatinine   Date Value Ref Range Status   10/17/2018 1.2 0.5 - 1.4 mg/dL Final     Calcium   Date Value Ref Range Status   10/17/2018 8.5 (L) 8.7 - 10.5 mg/dL Final     Total Protein   Date Value Ref Range Status   10/17/2018 6.1 6.0 - 8.4 g/dL Final     Albumin   Date Value Ref Range Status   10/17/2018 2.5 (L) 3.5 - 5.2 g/dL Final     Total Bilirubin   Date Value Ref Range Status   10/17/2018 1.8 (H) 0.1 - 1.0 mg/dL Final     Comment:     For infants and newborns, interpretation of results should be based  on gestational age, weight and in agreement with clinical  observations.  Premature Infant recommended reference ranges:  Up to 24 hours.............<8.0 mg/dL  Up to 48 hours............<12.0 mg/dL  3-5 days..................<15.0 mg/dL  6-29 days.................<15.0 mg/dL       Alkaline Phosphatase   Date Value Ref Range Status   10/17/2018 188 (H) 55 - 135 U/L Final     AST   Date Value Ref Range Status   10/17/2018 36 10 - 40 U/L Final     ALT   Date Value Ref Range Status   10/17/2018 47 (H) 10 - 44 U/L Final     Anion Gap   Date Value Ref Range Status   10/17/2018 9 8 - 16 mmol/L Final     eGFR if    Date Value Ref Range Status   10/17/2018 >60 >60 mL/min/1.73 m^2 Final     eGFR if non    Date Value Ref Range Status   10/17/2018 >60 >60 mL/min/1.73 m^2 Final     Comment:     Calculation used to  obtain the estimated glomerular filtration  rate (eGFR) is the CKD-EPI equation.          No results for input(s): PT, INR, APTT in the last 24 hours.  Ultrasound:  Hepatosplenomegaly, ascites, and right pleural effusion.  No liver mass or biliary ductal dilatation.  Nonspecific gallbladder wall thickening, which may relate to underlying hepatic dysfunction    Echocardiogram:  · Left ventricle ejection fraction is severely decreased at 25%  · Grade III (severe) left ventricular diastolic dysfunction consistent with restrictive physiology.  · LA pressure is elevated.  · Right ventricular cavity size is moderately dilated.  · Left atrium is severely dilated.  · The estimated PA systolic pressure is 59.85 mm Hg  · Pulmonary hypertension present.    A/P:  The patient is a 58 year old man with a history of HTN, DM, and gout presenting with right hand pain with cellulitis, new onset ascites, and lower extremity swelling.  1.  Ascites - the patient appeared to have cirrhosis.  He rarely drinks alcohol and he does not use intravenous drugs, have tattoos, or previously received blood transfusions.  A hepatitis panel is negative.  An RAYMOND level, anti smooth muscle antibody, and iron studies were normal.  An anti-LKM and alpha 1 antitrypsin level are pending.  An IgG level and ceruloplasmin are normal.  A paracentesis was performed and the SAAG is 0.9 with a total protein of 2.9.  Initially, it was thought that the source pointed more toward an etiology that may have been pancreatic, tuberculosis, or an occult malignancy as his creatinine was normal (suggesting against a renal etiology).  As it was suspected that this was hepatic in etiology and only a diagnostic paracentesis was performed with his abdomen still being tight, a therapeutic paracentesis was planned.  Apparently, it appears as if he has very minimal ascites and more anasarca.  An echocardiogram showed the findings above and a stress test is planned.  His  underlying issue to explain all of his edema could be cardiac.  The patient will be monitored briefly.  He was discussed at length with Dr. Trinh.

## 2018-10-17 NOTE — PHYSICIAN QUERY
"PT Name: Marvin Ray  MR #: [id number]     Physician Query Form - Documentation Clarification      Dia Vences RN CDS    Contact Information: [phone]    This form is a permanent document in the medical record.     Query Date: October 17, 2018    By submitting this query, we are merely seeking further clarification of documentation. Please utilize your independent clinical judgment when addressing the question(s) below.    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, with long-term current use of insulin       insulin aspart U-100 pen 1-10 UnitsDose: 1-10 Units : Subcutaneous : Before meals & nightly PRN :     insulin detemir U-100 pen 23 UnitsDose: 23 Units : Subcutaneous : Nightly     insulin detemir U-100 pen 20 UnitsDose: 20 Units : Subcutaneous : Nightly   10/11 Vascular Surgery Consult        10/15-10/17 MAR    10/16 MAR    10/13-10/15 MAR   POCT  Glucose 220, 198, 189  POCT  GLucose 116, 198, 190,   POC    Glucose 105, 104, 127, 122  POCT  Glucose 99, 129, 148, 202,   POCT  Glucose 113, 122, 135, 191  POCT  Glucose 70, 140, 167   10/10 POC  10/11 POC  10/12 POC  10/13 POC  10/14 POC                                                                            Doctor, Please specify diagnosis or diagnoses associated with above clinical findings.    Please clarify the term "Uncontrolled"    Provider Use Only      [ x] Type 2 Diabetes Mellitus with Hyperglycemia    [  ] Other Conditions (Specify)___________________________                                                                                                                [  ] Clinically undetermined            "

## 2018-10-17 NOTE — NURSING
Dr Mariusz quarles at bedside updated patient on status and plan of care, patient verbalized understanding

## 2018-10-17 NOTE — ASSESSMENT & PLAN NOTE
Wound care following. Worsened blistering on 10/15. Monitor again overnight and improved with coban wrap. He will need aggressive wound care after discharge.

## 2018-10-17 NOTE — ASSESSMENT & PLAN NOTE
H/o CAD with triple vessel disease but small vessels which were not candidates for PCI or surgical intervention. NST 10/17. Optimize medications. Cards recs appreciated.

## 2018-10-17 NOTE — ASSESSMENT & PLAN NOTE
LLE concerning for cellulitis. Blood cultures with Strep C which is unlikely to be a contaminant. Wound care consulted. Treated empirically with meropenem and vanc. Vanc stopped as meropenam would cover Strep. Antibiotics narrowed to ceftriaxone with culture resulted. Appreciate wound care recs. Plan to DC to continue a course of antibiotics with PO Keflex.

## 2018-10-17 NOTE — PLAN OF CARE
Problem: Patient Care Overview  Goal: Plan of Care Review  Pt is alert and oriented.  Pt is NPO for stress test.  Drsg changed to Lt lower leg and Rt lower abd.  Voiding without difficulty.  Pt treated with Tylenol for pain.  No falls/injuries this shift.

## 2018-10-17 NOTE — PT/OT/SLP PROGRESS
Occupational Therapy   Treatment    Name: Marvin Ray  MRN: [id number]  Admitting Diagnosis:  Left leg cellulitis       Recommendations:     Discharge Recommendations: home with home health(with family assistance)  Discharge Equipment Recommendations:  walker, rolling  Barriers to discharge:  None    Subjective     Communicated with: nurse prior to session.  Pain/Comfort:  · Pain Rating 1: (yes-did not rate)  · Location - Side 1: Left  · Location 1: foot  · Pain Addressed 1: Reposition, Pre-medicate for activity, Cessation of Activity, Nurse notified    Patients cultural, spiritual, Voodoo conflicts given the current situation:      Objective:     Patient found with: peripheral IV    General Precautions: Standard, fall   Orthopedic Precautions:N/A   Braces: N/A(The patient is wearing a glove to his right hand that his family purchased at the drug store)     Occupational Performance:    Bed Mobility:    · Patient completed Supine to Sit with stand by assistance  · Patient completed Sit to Supine with stand by assistance     Functional Mobility/Transfers:  · Patient completed Sit <> Stand Transfer with contact guard assistance  with  rolling walker   · Functional Mobility: The patient side stepped using a RW with SBA x5. The patient refused to amb away from the bed 2* foot pain.    Activities of Daily Living:  · Upper Body Dressing: contact guard assistance to don/doff gown while seated on the EOB    Patient left HOB elevated with all lines intact, call button in reach and PCT and sister present    [facility] [address] [facility] 6 Click:  [facility] [address] Total Score: 21    Treatment & Education:  The patient sat on the EOB to perform AROM to BUE x10 reps in all planes. The patient removed the compression glove from his right hand and was able to tolerate AAROM to the right hand. The patient was educated re: the need to remove the glove q. 2 hours and perform ROM as tolerated. The patient tolerated sitting on the EOB ~20 min for UE therex and  self care.  Education:    Assessment:     Marvin Ray is a 58 y.o. male with a medical diagnosis of Left leg cellulitis.  He presents with good tolerance for UE therex..  Performance deficits affecting function are weakness, impaired endurance, impaired self care skills, gait instability, impaired functional mobilty, impaired balance, decreased coordination, decreased upper extremity function, decreased lower extremity function, pain, decreased ROM, impaired fine motor, decreased safety awareness, edema.      Rehab Prognosis:  good; patient would benefit from acute skilled OT services to address these deficits and reach maximum level of function.       Plan:     Patient to be seen 2 x/week to address the above listed problems via self-care/home management, therapeutic activities, therapeutic exercises  · Plan of Care Expires: 10/27/18  · Plan of Care Reviewed with: patient, sibling(sister)    This Plan of care has been discussed with the patient who was involved in its development and understands and is in agreement with the identified goals and treatment plan    GOALS:   Multidisciplinary Problems     Occupational Therapy Goals        Problem: Occupational Therapy Goal    Goal Priority Disciplines Outcome Interventions   Occupational Therapy Goal     OT, PT/OT Ongoing (interventions implemented as appropriate)    Description:  Goals to be met by: 10/27/18     Patient will increase functional independence with ADLs by performing:    UE Dressing with Modified Isabella.  LE Dressing with Set-up Assistance.  Grooming while standing at sink with Contact Guard Assistance.  Toileting from toilet with Stand-by Assistance for hygiene and clothing management.   Pt to participate in L hand FMC tasks to increase use for ADLs.                       Time Tracking:     OT Date of Treatment: 10/17/18  OT Start Time: 1550  OT Stop Time: 1619  OT Total Time (min): 29 min    Billable Minutes:Self Care/Home Management  9  Therapeutic Exercise 9  Total Time 29    Celina Cai OT  10/17/2018

## 2018-10-17 NOTE — PT/OT/SLP PROGRESS
Physical Therapy      Patient Name:  Marvin Ray   MRN:  [id number]    Patient not seen today secondary to Unavailable (pt is off the unit for stress test ) . Will follow-up as able today .    More Howe, PTA

## 2018-10-17 NOTE — SUBJECTIVE & OBJECTIVE
Interval history:  No acute events. No new complaints.    Review of Systems   Constitutional: Negative for chills and fever.   HENT: Negative for congestion and rhinorrhea.    Respiratory: Negative for cough and shortness of breath.    Cardiovascular: Positive for leg swelling. Negative for chest pain.   Gastrointestinal: Negative for abdominal pain and nausea.   Musculoskeletal: Positive for arthralgias. Negative for gait problem.   Skin: Positive for color change and wound.   Neurological: Negative for dizziness and light-headedness.   Psychiatric/Behavioral: Negative for confusion and dysphoric mood.     Objective:     Vital Signs (Most Recent):  Temp: 97.6 °F (36.4 °C) (10/16/18 1942)  Pulse: 74 (10/16/18 1942)  Resp: 18 (10/16/18 1942)  BP: (!) 98/57 (10/16/18 1942)  SpO2: (!) 90 % (10/16/18 1942) Vital Signs (24h Range):  Temp:  [97.3 °F (36.3 °C)-97.8 °F (36.6 °C)] 97.6 °F (36.4 °C)  Pulse:  [74-97] 74  Resp:  [17-20] 18  SpO2:  [89 %-95 %] 90 %  BP: ()/(57-80) 98/57     Weight: 96.2 kg (212 lb)  Body mass index is 29.57 kg/m².    Physical Exam   Constitutional: He is oriented to person, place, and time. He appears well-developed and well-nourished. No distress.   HENT:   Right Ear: External ear normal.   Left Ear: External ear normal.   Nose: Nose normal.   Mouth/Throat: Oropharynx is clear and moist.   Eyes: EOM are normal. Pupils are equal, round, and reactive to light.   Neck: Normal range of motion. Neck supple. No thyromegaly present.   Cardiovascular: Normal rate and normal heart sounds. Exam reveals no friction rub.   No murmur heard.  Pulmonary/Chest: Effort normal and breath sounds normal. No respiratory distress. He has no wheezes. He has no rales.   Abdominal: Soft. Bowel sounds are normal. He exhibits no mass. There is no tenderness.   Obese   Musculoskeletal: He exhibits deformity. He exhibits no edema.   Right hand in glove  Right great and second toe amputated, well healed.    Neurological: He is alert and oriented to person, place, and time. No cranial nerve deficit.   Skin: No pallor.   Left lower extremity wrapped with coban.   Psychiatric: He has a normal mood and affect. His behavior is normal. Thought content normal.         CRANIAL NERVES     CN III, IV, VI   Pupils are equal, round, and reactive to light.  Extraocular motions are normal.         Significant Labs: All pertinent labs within the past 24 hours have been reviewed.    Significant Imaging: I have reviewed and interpreted all pertinent imaging results/findings within the past 24 hours.

## 2018-10-17 NOTE — ASSESSMENT & PLAN NOTE
Renal function improved during admission. Does not appear to have significant renal losses of protein.

## 2018-10-17 NOTE — PROGRESS NOTES
WRITTEN HEALTHCARE DISCHARGE INFORMATION     Things that YOU are RESPONSIBLE for to Manage Your Care At Home:    1. Getting your prescriptions filled.  2. Taking you medications as directed. DO NOT MISS ANY DOSES!  3. Going to your follow-up doctor appointments. This is important because it allows the doctor to monitor your progress and to determine if any changes need to be made to your treatment plan.    If you are unable to make your follow up appointments, please call the number listed and reschedule this appointment.     ____________HELP AT HOME____________________    Experiencing any SIGNS or SYMPTOMS: YOU CAN    Schedule a same day appopintment with your Primary Care Doctor or  you can call [facility] On Call Nurse Care Line for 24/7 assistance at [phone]    If you are experience any signs or symptoms that have become severe, Call 911 and come to your nearest Emergency Room.    Thank you for choosing [facility] and allowing us to care for you.   From your care management team: KOBY Centeno, [facility] [phone]     You should receive a call from [facility] Discharge Department within 48-72 hours to help manage your care after discharge. Please try to make sure that you answer your phone for this important phone call.     Follow-up Information     Rubén Davis MD On 10/18/2018.    Specialty:  Family Medicine  Why:  Thursday at 11am. Primary Care Appointment. [facility].   Contact information:  [address]  [place] [address]  [phone]             Anastacia Don III, MD On 10/23/2018.    Specialty:  Orthopedic Surgery  Why:  Tuesday at 1pm. Surgery Follow up appointment.   Contact information:  [address]  [address]  [place] [address]  [phone]             [facility] In 1 day.    Specialties:  DME Provider, Home Health Services  Why:  Home Health: Skilled Nurse for Wound Care.  Contact information:  [address]  [address]  [place] [address]  [phone]              Mitch Chan MD On 11/15/2018.    Specialties:  Vascular Surgery, Surgery, Cardiology  Why:  Thursday at 4pm. Vascular Surgery appointment.   Contact information:  [address]  [address] [place], [place] [id number] [phone]

## 2018-10-17 NOTE — ASSESSMENT & PLAN NOTE
"Acute on chronic right wrist pain. Xrays showed "significant abnormality with resorption or resection involving the majority of the lunate". Ortho recs appreciated. Follow up in clinic.  "

## 2018-10-17 NOTE — ASSESSMENT & PLAN NOTE
Given his tobacco history, previous poor wound healing, calcifications seen on Xray, and skin findings, PVD highly suspected. Vascular consulted, recs appreciated. Will follow up as outpatient once cellulitis is improved.

## 2018-10-17 NOTE — NURSING
Discharge instructions given to patient and family member, questions and concerns answered, Both verbalized understanding, PIV discontinued pressure held til hemostasis, gauze and tape applied, patient tolerated well, no redness, swelling or drainage noted at site, no hematoma. Patient and family member are aware of the signs and symptoms that may require patient to seek medical attention, Wound care done cdi, Prescriptions called in to pharmacy and patient is aware they need to be picked up           Notified [facility] of Patients discharge today, RN spoke with Annabella. Spoke with  Jad as well she stated someone will be out to see patient tomorrow

## 2018-10-17 NOTE — ASSESSMENT & PLAN NOTE
Continue home allopurinol. Will need follow up as outpatient. He has progressive deformities of his hands. Does not appear to have an acute flare. Appreciate ortho recs.

## 2018-10-17 NOTE — PLAN OF CARE
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       Patient is homebound due to:  Left leg cellulitis    Allergies:  Review of patient's allergies indicates:   Allergen Reactions    Statins-hmg-coa reductase inhibitors      Generalized Pain    Onglyza [saxagliptin]     Penicillins Rash       Diet: 1800 ADA, high protein, low salt    Acitivities: As tolerated    Nursing:   SN to complete comprehensive assessment including routine vital signs. Instruct on disease process and s/s of complications to report to MD. Review/verify medication list sent home with the patient at time of discharge  and instruct patient/caregiver as needed. Frequency may be adjusted depending on start of care date.    Notify MD if SBP > 160 or < 90; DBP > 90 or < 50; HR > 120 or < 50; Temp > 101      CONSULTS:    PT to evaluate and treat. Evaluate for home safety  "and equipment needs; Establish/upgrade home exercise program. Perform / instruct on therapeutic exercises, gait training, transfer training, and Range of Motion.    OT to evaluate and treat. Evaluate home environment for safety and equipment needs. Perform/Instruct on transfers, ADL training, ROM, and therapeutic exercises.        WOUND CARE:  Wound spray or saline for wound cleaning with all dressing changes.    All wounds to be measured with first dressing changes and every week.    Diabetic foot wounds:            Location:  Left leg          Apply the following to wound:   Cleansed foot and leg with NS. Placed jacqui of Aquacel Extra between toes. Covered draining areas with Aquacel Extra and covered with dry gauze. Wrapped with Kerlix roll and applied 4" Coban for light compression.          -  ET Consult        Medications: Review discharge medications with patient and family and provide education.       Marvin Ray   Home Medication Instructions DEIRDRE:[id number]    Printed on:10/17/18 1015   Medication Information                      allopurinol (ZYLOPRIM) 100 MG tablet  Take 2 tablets (200 mg total) by mouth once daily.             amLODIPine (NORVASC) 5 MG tablet  TAKE 1 TABLET(5 MG) BY MOUTH EVERY DAY             aspirin 81 MG Chew  Take 81 mg by mouth once daily.             benazepril (LOTENSIN) 40 MG tablet  TAKE 1 TABLET(40 MG) BY MOUTH TWICE DAILY             brimonidine 0.1% (ALPHAGAN P) 0.1 % Drop  Place 1 drop into both eyes 3 (three) times daily.             carvedilol (COREG) 6.25 MG tablet  Take 1 tablet (6.25 mg total) by mouth 2 (two) times daily.             cephALEXin (KEFLEX) 500 MG capsule  Take 1 capsule (500 mg total) by mouth every 8 (eight) hours. for 5 days             coenzyme Q10 100 mg capsule  Take 100 mg by mouth every morning.             dorzolamide-timolol 2-0.5% (COSOPT) 22.3-6.8 mg/mL ophthalmic solution  Place 1 drop into both eyes 3 (three) times daily.           " "  ezetimibe (ZETIA) 10 mg tablet  Take 1 tablet (10 mg total) by mouth once daily.             fish oil-omega-3 fatty acids 300-1,000 mg capsule  Take 2 capsules (2 g total) by mouth 2 (two) times daily.             insulin aspart U-100 (NOVOLOG) 100 unit/mL InPn pen  Inject 12 Units into the skin 3 (three) times daily with meals.             insulin glargine-lixisenatide (SOLIQUA 100/33) 100 unit-33 mcg/mL InPn  Inject 34 units once daily             ketorolac 0.5% (ACULAR) 0.5 % Drop  Place 1 drop into the right eye 3 (three) times daily.             MULTIVIT,THER IRON,CA,FA & MIN (MULTIVITAMIN) Tab  Take 1 tablet by mouth every morning.              pen needle, diabetic 31 gauge x 1/4" Ndle  Use as directed             prednisoLONE acetate (PRED FORTE) 1 % DrpS  Place 1 drop into the right eye 3 (three) times daily.             spironolactone (ALDACTONE) 100 MG tablet  Take 1 tablet (100 mg total) by mouth once daily.                       _________________________________  Kirsten Trinh MD  10/17/2018      "

## 2018-10-17 NOTE — NURSING
0903 Spoke with Penny in nuclear med concerning a time for patients test, Penny stated it will be in about an hour they will come and inject him then come get him 45 min from that time  0907 Called Penny back concerning patients low blood sugar she put Kota RN on the phone RN updated Kota on patients low blood sugar,

## 2018-10-17 NOTE — PLAN OF CARE
Problem: Fall Risk (Adult)  Goal: Identify Related Risk Factors and Signs and Symptoms  Related risk factors and signs and symptoms are identified upon initiation of Human Response Clinical Practice Guideline (CPG)   10/16/18 1015   Fall Risk   Related Risk Factors (Fall Risk) environment unfamiliar   Signs and Symptoms (Fall Risk) presence of risk factors       Problem: Infection, Risk/Actual (Adult)  Goal: Identify Related Risk Factors and Signs and Symptoms  Related risk factors and signs and symptoms are identified upon initiation of Human Response Clinical Practice Guideline (CPG)   10/17/18 1106   Infection, Risk/Actual   Related Risk Factors (Infection, Risk/Actual) tissue perfusion altered   Signs and Symptoms (Infection, Risk/Actual) weakness       Problem: Skin and Soft Tissue Infection (Adult)  Goal: Signs and Symptoms of Listed Potential Problems Will be Absent, Minimized or Managed (Skin and Soft Tissue Infection)  Signs and symptoms of listed potential problems will be absent, minimized or managed by discharge/transition of care (reference Skin and Soft Tissue Infection (Adult) CPG).   10/16/18 1015   Skin and Soft Tissue Infection   Problems Assessed (Skin and Soft Tissue Infection) all   Problems Present (Skin and Soft Tissue Infection) pain

## 2018-10-18 ENCOUNTER — OFFICE VISIT (OUTPATIENT)
Dept: FAMILY MEDICINE | Facility: CLINIC | Age: 58
End: 2018-10-18
Payer: COMMERCIAL

## 2018-10-18 ENCOUNTER — TELEPHONE (OUTPATIENT)
Dept: FAMILY MEDICINE | Facility: CLINIC | Age: 58
End: 2018-10-18

## 2018-10-18 VITALS
OXYGEN SATURATION: 95 % | HEART RATE: 69 BPM | DIASTOLIC BLOOD PRESSURE: 58 MMHG | HEIGHT: 71 IN | SYSTOLIC BLOOD PRESSURE: 108 MMHG | RESPIRATION RATE: 28 BRPM | TEMPERATURE: 98 F | BODY MASS INDEX: 29.57 KG/M2

## 2018-10-18 DIAGNOSIS — I73.9 PVD (PERIPHERAL VASCULAR DISEASE): ICD-10-CM

## 2018-10-18 DIAGNOSIS — I50.42 CHRONIC COMBINED SYSTOLIC AND DIASTOLIC HEART FAILURE: Primary | Chronic | ICD-10-CM

## 2018-10-18 DIAGNOSIS — L03.116 LEFT LEG CELLULITIS: ICD-10-CM

## 2018-10-18 DIAGNOSIS — I10 ESSENTIAL HYPERTENSION: Chronic | ICD-10-CM

## 2018-10-18 DIAGNOSIS — R60.0 BILATERAL LEG EDEMA: ICD-10-CM

## 2018-10-18 LAB — LKM AB SER QL: 1.4 UNITS

## 2018-10-18 PROCEDURE — 3008F BODY MASS INDEX DOCD: CPT | Mod: CPTII,S$GLB,, | Performed by: FAMILY MEDICINE

## 2018-10-18 PROCEDURE — 3078F DIAST BP <80 MM HG: CPT | Mod: CPTII,S$GLB,, | Performed by: FAMILY MEDICINE

## 2018-10-18 PROCEDURE — 99214 OFFICE O/P EST MOD 30 MIN: CPT | Mod: S$GLB,,, | Performed by: FAMILY MEDICINE

## 2018-10-18 PROCEDURE — 99999 PR PBB SHADOW E&M-EST. PATIENT-LVL III: CPT | Mod: PBBFAC,,, | Performed by: FAMILY MEDICINE

## 2018-10-18 PROCEDURE — 3074F SYST BP LT 130 MM HG: CPT | Mod: CPTII,S$GLB,, | Performed by: FAMILY MEDICINE

## 2018-10-18 NOTE — PROGRESS NOTES
Routine Office Visit    Patient Name: Marvin Ray    : 1960  MRN: [id number]    Subjective:  Marvin is a 58 y.o. male who presents today for:    1. Follow up  Patient presenting today after being admitted for anasarca, CHF, right leg cellulitis, and now ulcers to both legs.  He is having swelling in the right upper extremity and bilateral lower extremities.  His sister is with him and states that his legs are continuing to drain, but less than what they were doing.  He states that he was seen by cardiology in the hospital and told his heart was worse than prior studies showed.  He is not currently short of breath, but is weak.  His sister states that she has to help him ambulate.   He is currently being treated by [facility] for wound care.  He states that he is suppose to see Dr. Greer next week for follow up and is scheduled to see vascular surgery in the next couple of weeks as well.  While in the hospital his albumin levels were found to be low and his liver enzymes were initially elevated, but AST and ALT normal at time of discharge.  Alk phos remained elevated.  He denies any history of jaundice or liver disease.  He is diabetic and states that blood sugars have been lower than his normal with an occasional 67mg/dl reading while in the hospital.      Past Medical History  Past Medical History:   Diagnosis Date    Arthritis     Diabetes mellitus     Diabetic retinopathy     Glaucoma     Gout     Hyperlipemia     Hypertension        Past Surgical History  Past Surgical History:   Procedure Laterality Date    AHMED GLAUCOMA IMPLANT Left     DONE AT [facility]    BAERVELDT GLAUCOMA IMPLANT Left     WITH CATARACT EXTRACTION//DONE AT [facility]    CATARACT EXTRACTION W/  INTRAOCULAR LENS IMPLANT Left     WITH BAERVEDT//DONE AT [facility]    CATARACT EXTRACTION W/  INTRAOCULAR LENS IMPLANT Right 2018    COMPLEX ()    CB DESTRUCTION WITH CYCLO G6 Left 02/15/2017         CYST REMOVAL      HEART CATH-LEFT N/A 5/6/2016    Performed by Mike Magana MD at [facility] CATH LAB    INSERTION, IOL PROSTHESIS Right 9/26/2018    Performed by Perla Cortés MD at [facility] OR [address] [place] [address]    INTRAOCULAR PROSTHESES INSERTION Right 9/26/2018    Procedure: INSERTION, IOL PROSTHESIS;  Surgeon: Perla Cortés MD;  Location: [facility] OR [address] [place] [address];  Service: Ophthalmology;  Laterality: Right;    PHACOEMULSIFICATION OF CATARACT Right 9/26/2018    Procedure: PHACOEMULSIFICATION, CATARACT;  Surgeon: Perla Cortés MD;  Location: [facility] OR [address] [place] [address];  Service: Ophthalmology;  Laterality: Right;    PHACOEMULSIFICATION, CATARACT Right 9/26/2018    Performed by [facility] MD at [facility] OR [address] [place] [address]    Right foot surgery  10/2014    TOE AMPUTATION      TONSILLECTOMY      TRABECULECTOMY/G 6 LASER Left 2/15/2017    Performed by [facility] Cortés MD at [facility] OR [address] [place] [address]       Family History  Family History   Problem Relation Age of Onset    Diabetes Mother     Heart disease Brother     No Known Problems Father     No Known Problems Sister     No Known Problems Maternal Aunt     No Known Problems Maternal Uncle     No Known Problems Paternal Aunt     No Known Problems Paternal Uncle     No Known Problems Maternal Grandmother     No Known Problems Maternal Grandfather     No Known Problems Paternal Grandmother     No Known Problems Paternal Grandfather     Anemia Neg Hx     Arrhythmia Neg Hx     Asthma Neg Hx     Clotting disorder Neg Hx     Fainting Neg Hx     Heart attack Neg Hx     Heart failure Neg Hx     Hyperlipidemia Neg Hx     Hypertension Neg Hx     Stroke Neg Hx     Atrial Septal Defect Neg Hx     Amblyopia Neg Hx     Blindness Neg Hx     Glaucoma Neg Hx     Macular degeneration Neg Hx     Retinal detachment Neg Hx     Strabismus Neg Hx        Social History  Social History     Socioeconomic History    Marital status: Legally      Spouse  name: Not on file    Number of children: Not on file    Years of education: 14    Highest education level: Not on file   Social Needs    Financial resource strain: Not on file    Food insecurity - worry: Not on file    Food insecurity - inability: Not on file    Transportation needs - medical: Not on file    Transportation needs - non-medical: Not on file   Occupational History    Not on file   Tobacco Use    Smoking status: Former Smoker     Packs/day: 1.00     Years: 3.00     Pack years: 3.00     Last attempt to quit: 1984     Years since quittin.2    Smokeless tobacco: Never Used   Substance and Sexual Activity    Alcohol use: Yes     Alcohol/week: 0.6 oz     Types: 1 Cans of beer per week     Comment: Occasional    Drug use: No    Sexual activity: Not Currently   Other Topics Concern    Not on file   Social History Narrative    Not on file       Current Medications  Current Outpatient Medications on File Prior to Visit   Medication Sig Dispense Refill    allopurinol (ZYLOPRIM) 100 MG tablet Take 2 tablets (200 mg total) by mouth once daily. 180 tablet 3    amLODIPine (NORVASC) 5 MG tablet TAKE 1 TABLET(5 MG) BY MOUTH EVERY DAY 30 tablet 0    aspirin 81 MG Chew Take 81 mg by mouth once daily.      benazepril (LOTENSIN) 40 MG tablet TAKE 1 TABLET(40 MG) BY MOUTH TWICE DAILY 60 tablet 0    brimonidine 0.1% (ALPHAGAN P) 0.1 % Drop Place 1 drop into both eyes 3 (three) times daily. 15 mL 12    carvedilol (COREG) 6.25 MG tablet Take 1 tablet (6.25 mg total) by mouth 2 (two) times daily. 60 tablet 11    cephALEXin (KEFLEX) 500 MG capsule Take 1 capsule (500 mg total) by mouth every 8 (eight) hours. for 5 days 15 capsule 0    coenzyme Q10 100 mg capsule Take 100 mg by mouth every morning.      dorzolamide-timolol 2-0.5% (COSOPT) 22.3-6.8 mg/mL ophthalmic solution Place 1 drop into both eyes 3 (three) times daily. 10 mL 12    ezetimibe (ZETIA) 10 mg tablet Take 1 tablet (10 mg total)  "by mouth once daily. 30 tablet 2    fish oil-omega-3 fatty acids 300-1,000 mg capsule Take 2 capsules (2 g total) by mouth 2 (two) times daily. (Patient taking differently: Take 1 g by mouth 2 (two) times daily. ) 120 capsule 5    insulin aspart U-100 (NOVOLOG) 100 unit/mL InPn pen Inject 12 Units into the skin 3 (three) times daily with meals. 2 Box 11    insulin glargine-lixisenatide (SOLIQUA 100/33) 100 unit-33 mcg/mL InPn Inject 34 units once daily 5 Syringe 2    ketorolac 0.5% (ACULAR) 0.5 % Drop Place 1 drop into the right eye 3 (three) times daily. 1 Bottle 2    MULTIVIT,THER IRON,CA,FA & MIN (MULTIVITAMIN) Tab Take 1 tablet by mouth every morning.       pen needle, diabetic 31 gauge x 1/4" Ndle Use as directed 100 each 11    prednisoLONE acetate (PRED FORTE) 1 % DrpS Place 1 drop into the right eye 3 (three) times daily. 1 Bottle 2    spironolactone (ALDACTONE) 100 MG tablet Take 1 tablet (100 mg total) by mouth once daily. 30 tablet 11     Current Facility-Administered Medications on File Prior to Visit   Medication Dose Route Frequency Provider Last Rate Last Dose    [DISCONTINUED] acetaminophen tablet 650 mg  650 mg Oral Q6H PRN Kirsten Trinh MD   650 mg at 10/17/18 1508    [DISCONTINUED] allopurinol tablet 200 mg  200 mg Oral Daily Kirsten Trinh MD   200 mg at 10/17/18 1516    [DISCONTINUED] amLODIPine tablet 5 mg  5 mg Oral Daily Kirsten Trinh MD   5 mg at 10/17/18 1504    [DISCONTINUED] aspirin chewable tablet 81 mg  81 mg Oral Daily Kirsten Trinh MD   81 mg at 10/17/18 1505    [DISCONTINUED] benazepril tablet 40 mg  40 mg Oral Daily Kirsten Trinh MD   40 mg at 10/17/18 1506    [DISCONTINUED] brimonidine (ALPHAGAN P) ophthalmic solution 0.1%  1 drop Both Eyes TID Kirsten Trinh MD   1 drop at 10/17/18 1511    [DISCONTINUED] carvedilol tablet 6.25 mg  6.25 mg Oral BID Pernell Greer MD   6.25 mg at 10/17/18 1500    [DISCONTINUED] cefTRIAXone " (ROCEPHIN) 2 g in dextrose 5 % 50 mL IVPB  2 g Intravenous Q24H Kirsten Trinh MD   2 g at 10/17/18 1630    [DISCONTINUED] cloNIDine tablet 0.1 mg  0.1 mg Oral Q4H PRN Kirsten Trinh MD        [DISCONTINUED] dextrose 50% injection 12.5 g  12.5 g Intravenous PRN Kirsten Trinh MD   12.5 g at 10/17/18 0957    [DISCONTINUED] dextrose 50% injection 25 g  25 g Intravenous PRN Kirsten Trinh MD        [DISCONTINUED] dorzolamide-timolol 2-0.5% ophthalmic solution 1 drop  1 drop Both Eyes TID Kirsten Trinh MD   1 drop at 10/17/18 1511    [DISCONTINUED] ezetimibe tablet 10 mg  10 mg Oral Daily Kirsten Trinh MD   10 mg at 10/17/18 1505    [DISCONTINUED] furosemide tablet 40 mg  40 mg Oral Daily Сергей Ramires MD   40 mg at 10/17/18 1504    [DISCONTINUED] glucagon (human recombinant) injection 1 mg  1 mg Intramuscular PRN Kirsten Trinh MD        [DISCONTINUED] glucose chewable tablet 16 g  16 g Oral PRN Kirsten Trinh MD        [DISCONTINUED] glucose chewable tablet 24 g  24 g Oral PRN Kirsten Trinh MD        [DISCONTINUED] heparin (porcine) injection 5,000 Units  5,000 Units Subcutaneous Q8H Sarai Hu MD   5,000 Units at 10/17/18 1516    [DISCONTINUED] influenza (FLUZONE QUADRIVALENT) vaccine 0.5 mL  0.5 mL Intramuscular Prior to discharge Kirsten Trinh MD        [DISCONTINUED] insulin aspart U-100 pen 1-10 Units  1-10 Units Subcutaneous QID (AC + HS) PRN Kirsten Trinh MD   2 Units at 10/16/18 2142    [DISCONTINUED] insulin detemir U-100 pen 23 Units  23 Units Subcutaneous QHS Kirsten Trinh MD   23 Units at 10/16/18 2140    [DISCONTINUED] ketorolac 0.5% ophthalmic solution 1 drop  1 drop Right Eye TID Kirsten Trinh MD   1 drop at 10/17/18 1511    [DISCONTINUED] multivit-iron-FA-calcium-mins 9 mg iron-400 mcg tablet Tab 1 tablet  1 tablet Oral QAM Kirsten Trinh MD   1 tablet at 10/17/18 2033    [DISCONTINUED] prednisoLONE  "acetate 1 % ophthalmic suspension 1 drop  1 drop Right Eye TID Kirsten Trinh MD   1 drop at 10/17/18 1511    [DISCONTINUED] spironolactone tablet 100 mg  100 mg Oral Daily Сергей Ramires MD   100 mg at 10/17/18 1504       Allergies   Review of patient's allergies indicates:   Allergen Reactions    Statins-hmg-coa reductase inhibitors      Generalized Pain    Onglyza [saxagliptin]     Penicillins Rash       Review of Systems (Pertinent positives)  Review of Systems   Constitutional: Positive for malaise/fatigue. Negative for chills and fever.   HENT: Negative for congestion and sinus pain.    Eyes: Negative.    Respiratory: Negative for cough, sputum production and stridor.    Cardiovascular: Positive for leg swelling. Negative for chest pain and palpitations.   Gastrointestinal: Negative.    Genitourinary: Negative.    Skin:        +ulcers to both legs         BP (!) 108/58 (BP Location: Left arm, Patient Position: Sitting, BP Method: Medium (Manual))   Pulse 69   Temp 97.6 °F (36.4 °C) (Oral)   Resp (!) 28   Ht 5' 11" (1.803 m)   SpO2 95%   BMI 29.57 kg/m²     GENERAL APPEARANCE: in no apparent distress and well developed and well nourished  HEENT: PERRL, EOMI, Sclera clear, anicteric, Oropharynx clear, no lesions, Neck supple with midline trachea  NECK: normal, supple, no adenopathy, thyroid normal in size  RESPIRATORY: appears well, vitals normal, no respiratory distress, acyanotic, normal RR, chest clear, no wheezing, crepitations, rhonchi, normal symmetric air entry  HEART: regular rate and rhythm, S1, S2 normal, no murmur, click, rub or gallop.    ABDOMEN: abdomen is soft without tenderness, no masses, no hernias, no organomegaly, no rebound, no guarding. Suprapubic tenderness absent. No CVA tenderness.  NEUROLOGIC: normal without focal findings, CN II-XII are intact.    Extremities: warm/well perfused.  Bilateral lower extremities 3+ edema; 2+ in RUE with LUE being normal  SKIN: unable to " assess legs due to them being wrapped and no way to rewrap after visit  PSYCH: Alert, oriented x 3, thought content appropriate, speech normal, pleasant and cooperative, good eye contact, well groomed    Assessment/Plan:  Marvin Ray is a 58 y.o. male who presents today for :    Marvin was seen today for hospital followup.    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  Follow up with Dr. Greer  2.  Follow up with Sheryl Madison NP for diabetes  3.  Follow up with Dr. Villanueva for vascular disease  4.  Cellulitis is resolved at this time  5.  Follow up with ortho for edema of RUE due to risk of compartment syndrome  6.  Take all medications as directed at time of discharge      Rubén Davis MD

## 2018-10-18 NOTE — ASSESSMENT & PLAN NOTE
LLE concerning for cellulitis. Blood cultures with Strep C which is unlikely to be a contaminant. Wound care consulted. Treated empirically with meropenem and vanc. Vanc stopped as meropenam would cover Strep. Antibiotics narrowed to ceftriaxone with culture resulted. Wound care followed throughout admission. Stable to DC to continue a course of antibiotics with PO Keflex.

## 2018-10-18 NOTE — ASSESSMENT & PLAN NOTE
H/o CAD with triple vessel disease but small vessels which were not candidates for PCI or surgical intervention. NST 10/17 with RCA territory inferior wall motion abnormality. Optimize medications. Cards follow up in 1 week.

## 2018-10-18 NOTE — ASSESSMENT & PLAN NOTE
Wound care following. Worsened blistering on 10/15. Monitored again overnight and improved with coban wrap. He will need aggressive wound care after discharge. Home health ordered and his sister, Chloé, was educated on his dressing changes.

## 2018-10-18 NOTE — DISCHARGE SUMMARY
[facility] Hospital Medicine  Discharge Summary      Patient Name: Marvin Ray  MRN: [id number]  Admission Date: 10/10/2018  Hospital Length of Stay: 7 days  Discharge Date and Time: 10/17/2018  5:36 PM  Attending Physician: No att. providers found   Discharging Provider: Kirsten Trinh MD  Primary Care Provider: Rubén Davis MD      HPI:   Mr. Ray is a pleasant 57 yo man with IDDM (last A1c >14% 7/2018), essential HTN, HLD, gout, former tobacco abuse, and PVD with remote resection of the right 1st and 2nd toe who [facility] right hand pain. He has chronic right hand pain from arthritis but he felt the pain had gotten much worse in the past 2-3 days. He describes a shooting, aching pain, particularly through his thumb, 2nd, and 3rd fingers. He has limited ROM due to arthritis but reports that this is not new. He also has limited wrist flexion and extension.   In the [facility] he was noted to have concerning findings of his left lower extremity. He has chronic woody appearance and hyperpigmentation of the skin on his LLE, but in the past couple of days the skin has been more erythematous. He also notes new edema of the LLE as well as a new blister on the dorsum of his foot. He had decreased pulses though they could be found with doppler.     Hospital Course:   Mr. Ray was admitted due to concern for LLE cellulitis. Per the [facility] physician, she found his exam to be concerning for necrotizing fasciitis, however upon my exam I had very low clinical concern for this. He did appear to have chronic venous insufficiency and likely PVD. Vascular was consulted and thought his peripheral blood flow was sufficient without current need for intervention, however the patient does need to follow up with vascular once his cellulitis has improved.  He had chronic hyperpigmentation of his LLE c/w chronic venous stasis with an overlying area of erythema and calor  concerning for cellulitis. He was started on empiric antibiotics with meropenam and vancomycin (penicillin allergic). He did have a slightly elevated lactate that remained stable but no other signs concerning for sepsis on admission. Blood cultures grew group G Strep. Antibiotics narrowed to ceftriaxone with final susceptibility results on 10/13/18. Wound care followed during admission. His leg cellulitis appeared to improve with antibiotics, but he did have a large blister on his foot that continued to expand. He will need aggressive wound care after discharge. The wound was worsening again on 10/15; the LLE was wrapped with coban and re-evaluated the following morning. His wounds were improved - further wound care recs provided.  He was found to have slightly elevated LFTs during admission. Abdominal US showed hepatosplenomegaly, ascites, and a right pleural effusion. GI consulted. Dx paracentesis with IR on 10/15/18 w/ SAAG 0.9 but not enough fluid able to be collected for cell count. The amount of ascites was not enough to provide sufficient sample. His soft tissue edema was more substantial. He has a history of heart failure. Repeat echo showed EF 25% and G3DD. Cardiology was consulted. NST 10/17/2018 showed a moderate amount of infarct in the inferior wall(s) in the typical distribution of the RCA territory. He also had a post stress EF of 17%. Cardiology recommended medication adjustments during admission and wants the patient to follow up in clinic in 1 week.     Consults:   Consults (From admission, onward)        Status Ordering Provider     Inpatient consult to Cardiology  Once     Provider:  Pernell Greer MD    Completed ALAINA DE LA GARZA     Inpatient consult to Gastroenterology  Once     Provider:  Сергей Ramires MD    Completed ALAINA DE LA GARZA     Inpatient consult to Interventional Radiology  Once     Provider:  (Not yet assigned)    Completed СЕРГЕЙ RAMIRES     Inpatient consult to  Interventional Radiology  Once     Provider:  (Not yet assigned)    Completed CORDELL MITCHELL     Inpatient consult to Orthopedic Surgery  Once     Provider:  Anastacia Don III, MD    Completed ALAINA DE LA GARZA     Inpatient consult to Vascular Surgery  Once     Provider:  Mitch Chan MD    Completed ALAINA DE LA GARZA     IP consult to case management  Once     Provider:  (Not yet assigned)    Completed ALAINA DE LA GARZA          * Left leg cellulitis    LLE concerning for cellulitis. Blood cultures with Strep C which is unlikely to be a contaminant. Wound care consulted. Treated empirically with meropenem and vanc. Vanc stopped as meropenam would cover Strep. Antibiotics narrowed to ceftriaxone with culture resulted. Wound care followed throughout admission. Stable to DC to continue a course of antibiotics with PO Keflex.     Chronic combined systolic and diastolic heart failure    H/o CHF. Echo now with EF 25%, G3DD. Cards optimized medications. Known CAD.     Stage 3 chronic kidney disease    Renal function improved during admission. Does not appear to have significant renal losses of protein.     Hepatosplenomegaly    He was noted to have abnormal LFT and US abdomen showed HSM. GI consulted. Negative HCV in 2015. Paracentesis attempted but no pocket large enough to collect more than 3cc. SAAG 0.9 which isn't really consistent with portal hypertension. He has a significant amount of soft tissue edema which is more likely related to his hypoalbuminemia and heart failure. Further work up for HSM pending. Will need to follow up with GI as outpatient.     Multiple open wounds of lower leg    Wound care following. Worsened blistering on 10/15. Monitored again overnight and improved with coban wrap. He will need aggressive wound care after discharge. Home health ordered and his sister, Chloé, was educated on his dressing changes.     Right wrist pain    Acute on chronic right wrist pain. Xrays  "showed "significant abnormality with resorption or resection involving the majority of the lunate". Ortho recs appreciated. Follow up in clinic.     PVD (peripheral vascular disease)    Given his tobacco history, previous poor wound healing, calcifications seen on Xray, and skin findings, PVD highly suspected. Vascular consulted, recs appreciated. Will follow up as outpatient once cellulitis is improved.     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 NST 10/17 with RCA territory inferior wall motion abnormality. Optimize medications. Cards follow up in 1 week.     Essential hypertension    Continue home amlodipine and benazepril.      Tophaceous gout    Continue home allopurinol. Will need follow up as outpatient. He has progressive deformities of his hands. Does not appear to have an acute flare. Appreciate ortho recs.     Neovascular glaucoma of both eyes    Continue eye drops.     HLD (hyperlipidemia)    Continue Zetia.     DM type 2 with diabetic peripheral neuropathy    He has been seen in endocrine clinic. Continue to follow up with them as outpatient.       Final Active Diagnoses:    Diagnosis Date Noted POA    PRINCIPAL PROBLEM:  Left leg cellulitis [L03.116] 10/10/2018 Yes    Chronic combined systolic and diastolic heart failure [I50.42] 10/16/2018 Yes     Chronic    Hepatosplenomegaly [R16.2] 10/12/2018 Yes    Stage 3 chronic kidney disease [N18.3] 10/12/2018 Yes     Chronic    Right wrist pain [M25.531] 10/11/2018 Yes    Multiple open wounds of lower leg [S81.809A] 10/11/2018 Yes    PVD (peripheral vascular disease) [I73.9] 10/10/2018 Yes    Uncontrolled type 2 diabetes mellitus with both eyes affected by " "proliferative retinopathy and macular edema, with long-term current use of insulin [E11.3513, E11.65, Z79.4] 08/25/2016 Yes     Chronic    CAD (coronary artery disease) [I25.10] 05/31/2016 Yes     Chronic    Essential hypertension [I10] 05/06/2016 Yes     Chronic    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] 10/22/2015 Yes     Chronic    Neovascular glaucoma of both eyes [H40.53X0] 03/20/2015 Yes     Chronic    DM type 2 with diabetic peripheral neuropathy [E11.42] 10/22/2014 Yes     Chronic    HLD (hyperlipidemia) [E78.5] 10/22/2014 Yes     Chronic      Problems Resolved During this Admission:       Discharged Condition: stable    Disposition: [facility]-Health Care [facility]    Follow Up:  Follow-up Information     Rubén Davis MD On 10/18/2018.    Specialty:  Family Medicine  Why:  Thursday at 11am. Primary Care Appointment. [facility].   Contact information:  [address] [place] [address]  [phone]             Anastacia Don III, MD On 10/23/2018.    Specialty:  Orthopedic Surgery  Why:  Tuesday at 1pm. Surgery Follow up appointment.   Contact information:  [address]  [place] [address]  [phone]             [facility] In 1 day.    Specialties:  DME Provider, Home Health Services  Why:  Home Health: Skilled Nurse for Wound Care.  Contact information:  [address]  [place] [address]  [phone]             Mitch Chan MD On 11/15/2018.    Specialties:  Vascular Surgery, Surgery, Cardiology  Why:  Thursday at 4pm. Vascular Surgery appointment.   Contact information:  [address]  [address]  [place] [address]  [phone]             Pernell Greer MD In 1 week.    Specialties:  INTERVENTIONAL CARDIOLOGY, Cardiology  Contact information:  [address]  [address]  [place] [id number] [phone]                 Patient Instructions:      WALKER FOR HOME USE     Order Specific Question Answer Comments   Type of Walker: Adult (5'4"-6'6")    With " "wheels? Yes    Height: 5' 11" (1.803 m)    Weight: 96.2 kg (212 lb)    Length of need (1-99 months): 99    Does patient have medical equipment at home? cane, straight    Does patient have medical equipment at home? cane, quad    Does patient have medical equipment at home? wheelchair    Please check all that apply: Patient's condition impairs ambulation.    Please check all that apply: Patient is unable to safely ambulate without equipment.    Please check all that apply: Patient needs help to get in and out of chair.    Please check all that apply: Walker will be used for gait training.      Ambulatory Referral to Wound Clinic   Referral Priority: Routine Referral Type: Consultation   Referral Reason: Specialty Services Required   Requested Specialty: Wound Care   Number of Visits Requested: 1     Diet Cardiac     Diet diabetic     Diet diabetic     Diet Cardiac     Notify your health care provider if you experience any of the following:  increased confusion or weakness     Notify your health care provider if you experience any of the following:  persistent dizziness, light-headedness, or visual disturbances     Notify your health care provider if you experience any of the following:  worsening rash     Notify your health care provider if you experience any of the following:  severe persistent headache     Notify your health care provider if you experience any of the following:  difficulty breathing or increased cough     Notify your health care provider if you experience any of the following:  redness, tenderness, or signs of infection (pain, swelling, redness, odor or green/yellow discharge around incision site)     Notify your health care provider if you experience any of the following:  severe uncontrolled pain     Notify your health care provider if you experience any of the following:  persistent nausea and vomiting or diarrhea     Notify your health care provider if you experience any of the following:  " temperature >100.4     Notify your health care provider if you experience any of the following:  increased confusion or weakness     Notify your health care provider if you experience any of the following:  persistent dizziness, light-headedness, or visual disturbances     Notify your health care provider if you experience any of the following:  worsening rash     Notify your health care provider if you experience any of the following:  severe persistent headache     Notify your health care provider if you experience any of the following:  difficulty breathing or increased cough     Notify your health care provider if you experience any of the following:  redness, tenderness, or signs of infection (pain, swelling, redness, odor or green/yellow discharge around incision site)     Notify your health care provider if you experience any of the following:  severe uncontrolled pain     Notify your health care provider if you experience any of the following:  persistent nausea and vomiting or diarrhea     Notify your health care provider if you experience any of the following:  temperature >100.4     Activity as tolerated     Activity as tolerated       Pending Diagnostic Studies:     Procedure Component Value Units Date/Time    Alpha 1 Antitrypsin Defiiciency Profile [[id number]] Collected:  10/15/18 0733    Order Status:  Sent Lab Status:  In process Updated:  10/15/18 0802    Specimen:  Blood     Anti-Liver, Kidney, Microsome Ab [[id number]] Collected:  10/15/18 0733    Order Status:  Sent Lab Status:  In process Updated:  10/15/18 0802    Specimen:  Blood          Medications:  Reconciled Home Medications:      Medication List      START taking these medications    carvedilol 6.25 MG tablet  Commonly known as:  COREG  Take 1 tablet (6.25 mg total) by mouth 2 (two) times daily.     cephALEXin 500 MG capsule  Commonly known as:  KEFLEX  Take 1 capsule (500 mg total) by mouth every 8 (eight) hours. for 5 days    "  spironolactone 100 MG tablet  Commonly known as:  ALDACTONE  Take 1 tablet (100 mg total) by mouth once daily.        CHANGE how you take these medications    fish oil-omega-3 fatty acids 300-1,000 mg capsule  Take 2 capsules (2 g total) by mouth 2 (two) times daily.  What changed:  how much to take        CONTINUE taking these medications    allopurinol 100 MG tablet  Commonly known as:  ZYLOPRIM  Take 2 tablets (200 mg total) by mouth once daily.     amLODIPine 5 MG tablet  Commonly known as:  NORVASC  TAKE 1 TABLET(5 MG) BY MOUTH EVERY DAY     aspirin 81 MG Chew  Take 81 mg by mouth once daily.     benazepril 40 MG tablet  Commonly known as:  LOTENSIN  TAKE 1 TABLET(40 MG) BY MOUTH TWICE DAILY     brimonidine 0.1% 0.1 % Drop  Commonly known as:  ALPHAGAN P  Place 1 drop into both eyes 3 (three) times daily.     coenzyme Q10 100 mg capsule  Take 100 mg by mouth every morning.     dorzolamide-timolol 2-0.5% 22.3-6.8 mg/mL ophthalmic solution  Commonly known as:  COSOPT  Place 1 drop into both eyes 3 (three) times daily.     ezetimibe 10 mg tablet  Commonly known as:  ZETIA  Take 1 tablet (10 mg total) by mouth once daily.     insulin aspart U-100 100 unit/mL Inpn pen  Commonly known as:  NovoLOG  Inject 12 Units into the skin 3 (three) times daily with meals.     insulin glargine-lixisenatide 100 unit-33 mcg/mL Inpn  Commonly known as:  SOLIQUA 100/33  Inject 34 units once daily     ketorolac 0.5% 0.5 % Drop  Commonly known as:  ACULAR  Place 1 drop into the right eye 3 (three) times daily.     multivitamin Tab  Take 1 tablet by mouth every morning.     pen needle, diabetic 31 gauge x 1/4" Ndle  Use as directed     prednisoLONE acetate 1 % Drps  Commonly known as:  PRED FORTE  Place 1 drop into the right eye 3 (three) times daily.        STOP taking these medications    nitroGLYCERIN 0.4 MG SL tablet  Commonly known as:  NITROSTAT          Time spent on the discharge of patient: 75 minutes  Patient was seen and " examined on the date of discharge and determined to be suitable for discharge.         Kirsten Trinh MD  Department of Hospital Medicine  [facility]

## 2018-10-19 ENCOUNTER — OFFICE VISIT (OUTPATIENT)
Dept: ENDOCRINOLOGY | Facility: CLINIC | Age: 58
End: 2018-10-19
Payer: COMMERCIAL

## 2018-10-19 VITALS
DIASTOLIC BLOOD PRESSURE: 62 MMHG | SYSTOLIC BLOOD PRESSURE: 103 MMHG | BODY MASS INDEX: 29.57 KG/M2 | HEIGHT: 71 IN | HEART RATE: 69 BPM

## 2018-10-19 DIAGNOSIS — R80.9 MICROALBUMINURIA: ICD-10-CM

## 2018-10-19 DIAGNOSIS — I10 ESSENTIAL HYPERTENSION: ICD-10-CM

## 2018-10-19 LAB
A1AT PROTEOTYPE S/Z, LC-MS/MS: ABNORMAL
A1AT SERPL NEPH-MCNC: 302 MG/DL
BACTERIA SPEC AEROBE CULT: NO GROWTH
BACTERIA SPEC ANAEROBE CULT: NORMAL

## 2018-10-19 PROCEDURE — 3008F BODY MASS INDEX DOCD: CPT | Mod: CPTII,S$GLB,, | Performed by: NURSE PRACTITIONER

## 2018-10-19 PROCEDURE — 99999 PR PBB SHADOW E&M-EST. PATIENT-LVL IV: CPT | Mod: PBBFAC,,, | Performed by: NURSE PRACTITIONER

## 2018-10-19 PROCEDURE — 99214 OFFICE O/P EST MOD 30 MIN: CPT | Mod: S$GLB,,, | Performed by: NURSE PRACTITIONER

## 2018-10-19 PROCEDURE — 3074F SYST BP LT 130 MM HG: CPT | Mod: CPTII,S$GLB,, | Performed by: NURSE PRACTITIONER

## 2018-10-19 PROCEDURE — 3078F DIAST BP <80 MM HG: CPT | Mod: CPTII,S$GLB,, | Performed by: NURSE PRACTITIONER

## 2018-10-19 RX ORDER — NITROGLYCERIN 0.4 MG/1
0.4 TABLET SUBLINGUAL EVERY 5 MIN PRN
COMMUNITY
End: 2018-10-30 | Stop reason: CLARIF

## 2018-10-19 NOTE — PROGRESS NOTES
"CC: This 58 y.o. White male  is here for evaluation of  T2DM along with comorbidities indicated in the Visit Diagnosis section of this encounter.    HPI: Marvin Ray was diagnosed with T2DM in his late 20s. Pt was initially diet controlled, then required oral medications, then eventually required insulin.     A1c in July 2018 was high at > 14%  because he couldn't afford insulin (Lantus ~$600). States his insurance plan is very expensive this and he makes a low salary. Also has a deductible of at least 1k.     Prior visit on 9/7/18  He started Soliqua and tolerating it well without nausea.   Plan Continue current regimen.   Test bg 2x/day - fasting and again before/2hours post dinner.   rtc in 2 mo with labs prior.     Interval history arrives with his sister Chloé.   He returns early to clinic today because he was discharged from the hospital earlier this week. Admitted x 7 days for anasarca, CHF, right leg cellulitis, and now ulcers to both legs.     He was discharged from hospital on tresiba 24 units once daily 2 days ago. Fasting glucoses are 184 and 128 today.  prelunch 219. His appetite is fine. Turkey and egg omelette, 1 slice of toast, strawberries.       LAST DIABETES EDUCATION: years ago     PRESCRIBED DIABETES MEDICATIONS:  Soliqua 34 units every morning   Misses medication doses - No    DM COMPLICATIONS: nephropathy, retinopathy and peripheral neuropathy  toe amputations (2/2 infection)    SIGNIFICANT DIABETES MED HISTORY:   Constipation on Onglyza     SELF MONITORING BLOOD GLUCOSE: see above     HYPOGLYCEMIC EPISODES: none      CURRENT DIET: drinks water and black tea with lemon. Eats 3 meals/day. Believes his diet is healthy, tries to watch carbs.     CURRENT EXERCISE: none. Previously -   Works out on [place] 2-3x/week, about 12 - 30 minutes each time     SOCIAL:      /62 (BP Location: Left arm, Patient Position: Sitting, BP Method: Large (Automatic))   Pulse 69   Ht 5' 11" (1.803 m)   " BMI 29.57 kg/m²       ROS:   CONSTITUTIONAL: Appetite good, denies fatigue  RESPIRATORY: No shortness of breath or cough  CARDIAC: No chest pain or palpitations  OTHER: n/a      PHYSICAL EXAM:  GENERAL: Well developed, well nourished. No acute distress.   PSYCH: AAOx3, appropriate mood and affect, conversant, well-groomed. Judgement and insight good. Appears chronically ill.   NEURO: Cranial nerves grossly intact. Speech clear, no tremor.   CHEST: Respirations even and unlabored.         Hemoglobin A1C   Date Value Ref Range Status   07/27/2018 >14.0 (H) 4.0 - 5.6 % Final     Comment:     ADA Screening Guidelines:  5.7-6.4%  Consistent with prediabetes  >or=6.5%  Consistent with diabetes  High levels of fetal hemoglobin interfere with the HbA1C  assay. Heterozygous hemoglobin variants (HbS, HgC, etc)do  not significantly interfere with this assay.   However, presence of multiple variants may affect accuracy.     08/25/2016 9.0 (H) 4.5 - 6.2 % Final     Comment:     According to ADA guidelines, hemoglobin A1C <7.0% represents  optimal control in non-pregnant diabetic patients.  Different  metrics may apply to specific populations.   Standards of Medical Care in Diabetes - 2016.  For the purpose of screening for the presence of diabetes:  <5.7%     Consistent with the absence of diabetes  5.7-6.4%  Consistent with increasing risk for diabetes   (prediabetes)  >or=6.5%  Consistent with diabetes  Currently no consensus exists for use of hemoglobin A1C  for diagnosis of diabetes for children.     05/06/2016 8.7 (H) 4.5 - 6.2 % Final           Chemistry        Component Value Date/Time     (L) 10/17/2018 0402    K 3.9 10/17/2018 0402     10/17/2018 0402    CO2 22 (L) 10/17/2018 0402    BUN 46 (H) 10/17/2018 0402    CREATININE 1.2 10/17/2018 0402     10/17/2018 0402        Component Value Date/Time    CALCIUM 8.5 (L) 10/17/2018 0402    ALKPHOS 188 (H) 10/17/2018 0402    AST 36 10/17/2018 0402    ALT 47  (H) 10/17/2018 0402    BILITOT 1.8 (H) 10/17/2018 0402    ESTGFRAFRICA >60 10/17/2018 0402    EGFRNONAA >60 10/17/2018 0402          Lab Results   Component Value Date    LDLCALC 105.4 07/27/2018        Ref. Range 7/27/2018 08:20   Cholesterol Latest Ref Range: 120 - 199 mg/dL 151   HDL Latest Ref Range: 40 - 75 mg/dL 29 (L)   LDL Cholesterol Latest Ref Range: 63.0 - 159.0 mg/dL 105.4   Total Cholesterol/HDL Ratio Latest Ref Range: 2.0 - 5.0  5.2 (H)   Triglycerides Latest Ref Range: 30 - 150 mg/dL 83     Lab Results   Component Value Date    MICALBCREAT 4564.0 (H) 07/27/2018           STANDARDS of CARE:        ASA:               Last eye exam:       ASSESSMENT and PLAN:    A1C GOAL: < 7 %     1. Uncontrolled type 2 diabetes mellitus with complication, with long-term current use of insulin  Test blood sugar before meals/bedtime, 2-3x/day. Send blood sugar log in a week. Our fax number is [phone].      Continue Tresiba at 24 units once daily. If you run out of Tresiba, you can switch to Lantus 24 units once daily since you have that at home.     Will consider keeping you on Tresiba vs. Switching you back to Soliqua after blood sugar log is reviewed.     Your appointment in early November may be rescheduled for a later date if your blood sugar numbers look good.          2. Microalbuminuria  Continue benazepril    3. Essential hypertension  controlled         No orders of the defined types were placed in this encounter.       No Follow-up on file.

## 2018-10-19 NOTE — PATIENT INSTRUCTIONS
Test blood sugar before meals/bedtime, 2-3x/day. Send blood sugar log in a week. Our fax number is [phone].      Continue Tresiba at 24 units once daily. If you run out of Tresiba, you can switch to Lantus 24 units once daily since you have that at home.     Will consider keeping you on Tresiba vs. Switching you back to Soliqua after blood sugar log is reviewed.     Your appointment in early November may be rescheduled for a later date if your blood sugar numbers look good.

## 2018-10-23 ENCOUNTER — OFFICE VISIT (OUTPATIENT)
Dept: WOUND CARE | Facility: CLINIC | Age: 58
End: 2018-10-23
Payer: COMMERCIAL

## 2018-10-23 VITALS
SYSTOLIC BLOOD PRESSURE: 117 MMHG | DIASTOLIC BLOOD PRESSURE: 72 MMHG | BODY MASS INDEX: 30.78 KG/M2 | TEMPERATURE: 97 F | HEART RATE: 74 BPM | WEIGHT: 215 LBS | HEIGHT: 70 IN

## 2018-10-23 DIAGNOSIS — I73.9 PVD (PERIPHERAL VASCULAR DISEASE): Primary | ICD-10-CM

## 2018-10-23 DIAGNOSIS — E11.42 DM TYPE 2 WITH DIABETIC PERIPHERAL NEUROPATHY: Chronic | ICD-10-CM

## 2018-10-23 DIAGNOSIS — I50.42 CHRONIC COMBINED SYSTOLIC AND DIASTOLIC HEART FAILURE: Chronic | ICD-10-CM

## 2018-10-23 DIAGNOSIS — L97.522 NON-PRESSURE CHRONIC ULCER OF OTHER PART OF LEFT FOOT WITH FAT LAYER EXPOSED: ICD-10-CM

## 2018-10-23 PROCEDURE — 3078F DIAST BP <80 MM HG: CPT | Mod: CPTII,S$GLB,, | Performed by: NURSE PRACTITIONER

## 2018-10-23 PROCEDURE — 3008F BODY MASS INDEX DOCD: CPT | Mod: CPTII,S$GLB,, | Performed by: NURSE PRACTITIONER

## 2018-10-23 PROCEDURE — 99999 PR PBB SHADOW E&M-EST. PATIENT-LVL IV: CPT | Mod: PBBFAC,,, | Performed by: NURSE PRACTITIONER

## 2018-10-23 PROCEDURE — 3074F SYST BP LT 130 MM HG: CPT | Mod: CPTII,S$GLB,, | Performed by: NURSE PRACTITIONER

## 2018-10-23 PROCEDURE — 99203 OFFICE O/P NEW LOW 30 MIN: CPT | Mod: S$GLB,,, | Performed by: NURSE PRACTITIONER

## 2018-10-23 NOTE — PROGRESS NOTES
Subjective:       Patient ID: Marvin Ray is a 58 y.o. male.    Chief Complaint: Wound Check    Mr. Ray is a 59 yo male who presents today for evaluation of wounds to the left leg. He PMhx includes CHF, PVD, ascities, DM. He recently has a vascular work up.  Findings are consistent with vessel calcification/vessel hardening on the right and elevated velocities within the mid left superficial femoral artery, left peroneal artery, and left dorsalis pedis artery suggesting moderate hemodynamically significant stenoses. He also has ascites and chronic systolic and diastolic heart failure. . The patient has an appt with cardiology in two weeks to discuss his circulatory status to the lower extremities. He also underwent a paracenthesis on 10/15/2018 and the right side of his abdomen is leaking a continuous flow of serous colored fluid. The left foot and ankle are edematous with blistering and necrosis.  They are here today for recommendations.       Review of Systems   Constitutional: Positive for fatigue. Negative for chills, diaphoresis and fever.   HENT: Negative.  Negative for ear pain, nosebleeds, sinus pain, sore throat and trouble swallowing.    Eyes: Positive for visual disturbance. Negative for pain and redness.   Respiratory: Positive for shortness of breath. Negative for cough and wheezing.    Cardiovascular: Positive for leg swelling (Blistering to the left foot and lower leg). Negative for chest pain and palpitations.        Hx CHF  PVD Bilateral lower legs     Gastrointestinal: Positive for abdominal distention. Negative for abdominal pain, blood in stool, nausea and vomiting.        Draining fluid from parenthesis site   Endocrine: Negative for polydipsia, polyphagia and polyuria.        Type 2 DM insulin dependent   Genitourinary: Negative.  Negative for flank pain and hematuria.   Musculoskeletal: Positive for arthralgias (Hand). Negative for back pain, joint swelling and myalgias.         Great toe and second toe amputation on the right leg. States had spider bite.    Skin: Positive for wound (wounds, blisters to the left foot).   Allergic/Immunologic: Negative.    Neurological: Positive for weakness (Generalized) and numbness. Negative for seizures, facial asymmetry and headaches.   Hematological: Negative for adenopathy. Does not bruise/bleed easily.   Psychiatric/Behavioral: Positive for dysphoric mood. Negative for agitation and sleep disturbance. The patient is not nervous/anxious.        Objective:      Physical Exam   Constitutional: He is oriented to person, place, and time. Vital signs are normal. He has a sickly appearance. He appears distressed.   HENT:   Head: Normocephalic.   Mouth/Throat: Oropharynx is clear and moist.   Eyes: Conjunctivae, EOM and lids are normal. Pupils are equal, round, and reactive to light.   Neck: Trachea normal and normal range of motion. Carotid bruit is not present. No thyromegaly present.   Cardiovascular: Normal rate, regular rhythm and intact distal pulses.   Murmur heard.   Systolic murmur is present.  Pulses:       Popliteal pulses are 0 on the right side, and 0 on the left side.        Dorsalis pedis pulses are 0 on the right side, and 0 on the left side.   Pulmonary/Chest: Effort normal and breath sounds normal. No respiratory distress. He has no wheezes. He has no rales.   Abdominal: Soft. He exhibits ascites. He exhibits no distension. There is no tenderness.   Leakage of serous fluid from the right side of abdomen. Clothes are saturated from leakage.    Musculoskeletal: Normal range of motion.        Arms:       Feet:    Lymphadenopathy:     He has no cervical adenopathy.   Neurological: He is alert and oriented to person, place, and time.   Skin: Skin is warm and dry. Capillary refill takes 2 to 3 seconds. He is not diaphoretic. No erythema.   Psychiatric: He has a normal mood and affect. Thought content normal.   Vitals reviewed.                             Assessment:       1. PVD (peripheral vascular disease)    2. Non-pressure chronic ulcer of other part of left foot with fat layer exposed    3. Chronic combined systolic and diastolic heart failure    4. DM type 2 with diabetic peripheral neuropathy        Plan:           Clean left leg, left foot, right third toe with dove soap and water daily  Paint affected areas with betadine twice a day  Cover with ABD pads  Kerlex roll to secure pads  Also may leave Open to air instead of ABD pads and allow leg to drain on an absorbant pad during the day  Follow up with cardiology as advised  ABD pad to left abdomen to collect fluid  May pouch left abdomen with urostomy pouch. Change every 3-4 days    Return to clinic in two weeks or follow up with previous wound care specialist (Dr. Lopez)    Orders sent to [facility]

## 2018-10-23 NOTE — PATIENT INSTRUCTIONS
Clean left leg, left foot, right third toe with dove soap and water daily  Paint affected areas with betadine twice a day  Cover with ABD pads  Kerlex roll to secure pads  Also may leave Open to air instead of ABD pads and allow leg to drain on an absorbant pad during the day  Follow up with cardiology as advised  ABD pad to left abdomen to collect fluid    Return to clinic in two weeks or follow up with previous wound care specialist (Dr. Lopez)

## 2018-10-24 ENCOUNTER — TELEPHONE (OUTPATIENT)
Dept: ENDOCRINOLOGY | Facility: CLINIC | Age: 58
End: 2018-10-24

## 2018-10-24 ENCOUNTER — OFFICE VISIT (OUTPATIENT)
Dept: CARDIOLOGY | Facility: CLINIC | Age: 58
End: 2018-10-24
Payer: COMMERCIAL

## 2018-10-24 VITALS
OXYGEN SATURATION: 97 % | RESPIRATION RATE: 16 BRPM | HEART RATE: 82 BPM | WEIGHT: 214.94 LBS | DIASTOLIC BLOOD PRESSURE: 62 MMHG | SYSTOLIC BLOOD PRESSURE: 116 MMHG | BODY MASS INDEX: 30.84 KG/M2

## 2018-10-24 DIAGNOSIS — N18.30 STAGE 3 CHRONIC KIDNEY DISEASE: ICD-10-CM

## 2018-10-24 DIAGNOSIS — I73.9 PVD (PERIPHERAL VASCULAR DISEASE): ICD-10-CM

## 2018-10-24 DIAGNOSIS — E78.2 MIXED HYPERLIPIDEMIA: ICD-10-CM

## 2018-10-24 DIAGNOSIS — I25.10 CORONARY ARTERY DISEASE INVOLVING NATIVE CORONARY ARTERY OF NATIVE HEART WITHOUT ANGINA PECTORIS: ICD-10-CM

## 2018-10-24 DIAGNOSIS — I10 ESSENTIAL HYPERTENSION: ICD-10-CM

## 2018-10-24 DIAGNOSIS — E11.42 DM TYPE 2 WITH DIABETIC PERIPHERAL NEUROPATHY: ICD-10-CM

## 2018-10-24 DIAGNOSIS — I50.42 CHRONIC COMBINED SYSTOLIC AND DIASTOLIC HEART FAILURE: Primary | ICD-10-CM

## 2018-10-24 PROCEDURE — 3074F SYST BP LT 130 MM HG: CPT | Mod: CPTII,S$GLB,, | Performed by: INTERNAL MEDICINE

## 2018-10-24 PROCEDURE — 99214 OFFICE O/P EST MOD 30 MIN: CPT | Mod: S$GLB,,, | Performed by: INTERNAL MEDICINE

## 2018-10-24 PROCEDURE — 3008F BODY MASS INDEX DOCD: CPT | Mod: CPTII,S$GLB,, | Performed by: INTERNAL MEDICINE

## 2018-10-24 PROCEDURE — 3078F DIAST BP <80 MM HG: CPT | Mod: CPTII,S$GLB,, | Performed by: INTERNAL MEDICINE

## 2018-10-24 PROCEDURE — 99999 PR PBB SHADOW E&M-EST. PATIENT-LVL III: CPT | Mod: PBBFAC,,, | Performed by: INTERNAL MEDICINE

## 2018-10-24 NOTE — TELEPHONE ENCOUNTER
Called patient who stated he is taking 40 units of Lantus, since the prescribed 24 units is not helping and his BG are still running high, and provided the following readings.                Date  Before Breakfast   Before Lunch   Before Dinner        Bedtime    10/20/18 296 256 317    10/21/18 358 340 337    10/22/18 92 184     10/23/18 307 296 304    10/24/18 217               Asked patient if he did anything different on 10/22/18 and he said no.

## 2018-10-24 NOTE — PROGRESS NOTES
Subjective:    Patient ID:  Marvin Ray is a 58 y.o. male who presents for follow-up of Hospital Follow Up and Shortness of Breath      HPI  Patient is here for follow-up of coronary artery disease and systolic heart failure.  He recently was admitted the hospital for worsening volume overload and lower extremity wound infection.  He has been following in wound care clinic and was seen by vascular as well.  TBI was abnormal.  He denies any cardiopulmonary complaints after diuresis.  He is on adequate med therapy but was not resumed on diuretic on discharge.  He denies any current chest pain, shortness of breath or palpitations.  He has experienced no PND, orthopnea and has stable lower extremity edema.  He denies any dizziness, presyncope or syncope.  He is mainly weekend and wants to try and do physical rehabilitation but is limited currently with respect to his previous foot amputation in wounds.    Review of Systems   Constitution: Negative.   HENT: Negative.    Eyes: Negative.    Cardiovascular: Negative for chest pain, dyspnea on exertion, irregular heartbeat, leg swelling, near-syncope, orthopnea, palpitations, paroxysmal nocturnal dyspnea and syncope.   Respiratory: Positive for shortness of breath.    Skin: Positive for poor wound healing.   Musculoskeletal: Negative.    Gastrointestinal: Negative for abdominal pain, constipation and diarrhea.   Genitourinary: Negative for dysuria.   Neurological: Negative.  Negative for dizziness.   Psychiatric/Behavioral: Negative.         Objective:    Physical Exam   Constitutional: He is oriented to person, place, and time. He appears well-developed and well-nourished. No distress.   HENT:   Head: Normocephalic and atraumatic.   Eyes: Conjunctivae and EOM are normal. Pupils are equal, round, and reactive to light.   Neck: Normal range of motion. Neck supple. No thyromegaly present.   Cardiovascular: Normal rate, regular rhythm and normal heart sounds.   No murmur  heard.  Pulmonary/Chest: Effort normal and breath sounds normal. No respiratory distress. He has no wheezes. He has no rales. He exhibits no tenderness.   Abdominal: Soft. Bowel sounds are normal.   Musculoskeletal: He exhibits no edema.   Neurological: He is alert and oriented to person, place, and time.   Skin: Skin is warm and dry.   Psychiatric: He has a normal mood and affect. His behavior is normal.       Echo:  10-18  · Left ventricle ejection fraction is severely decreased at 25%  · Grade III (severe) left ventricular diastolic dysfunction consistent with restrictive physiology.  · LA pressure is elevated.  · Right ventricular cavity size is moderately dilated.  · Left atrium is severely dilated.  · The estimated PA systolic pressure is 59.85 mm Hg  · Pulmonary hypertension present.    NST:  · Abnormal myocardial perfusion study  · There is a moderate amount of infarct in the inferior wall(s).In the typical distribution of the RCA territory.  · Post Stress Ejection Fraction is 17 %    LHC:  B. Summary/Post-Operative Diagnosis       Three vessel coronary artery disease.    LV systolic and diastolic dysfunction.    E. Angiographic Results     Diagnostic:          Patient has a right dominant coronary artery.        - Left Main Coronary Artery:             The LM has luminal irregularities. There is BAKARI 3 flow.     - Left Anterior Descending Artery:             The proximal LAD has a 80% stenosis. There is BAKARI 3 flow.             The mid LAD has a 90% stenosis. There is BAKARI 3 flow. The remaining portion of the vessel is diffusely diseased.     - Left Circumflex Artery:             The mid LCX has a 60% stenosis. There is BAKARI 3 flow. The remaining portion of the vessel has luminal irregularities.     - Right Coronary Artery:             The proximal RCA has a 60% stenosis. There is BAKARI 3 flow.             The mid RCA has a 80% stenosis. There is BAKARI 3 flow. The remaining portion of the vessel has luminal  irregularities.     - Posterior Descending Artery:             The proximal PDA has a 60% stenosis. There is BAKARI 3 flow. The remaining portion of the vessel has luminal irregularities.     - Radial:             The radial.      Assessment:       1. Chronic combined systolic and diastolic heart failure    2. Coronary artery disease involving native coronary artery of native heart without angina pectoris    3. Essential hypertension    4. Mixed hyperlipidemia    5. PVD (peripheral vascular disease)    6. DM type 2 with diabetic peripheral neuropathy    7. Stage 3 chronic kidney disease         Plan:       -continue med therapy  -qualifies for ICD placement single-chamber for ischemic dilated cardiomyopathy (QRS less than 120 milliseconds, life expectancy greater than 1 year, near card Association class 3)  -add low-dose maintenance Lasix  -limb ischemia followed by vascular  -follow-up with wound care clinic    Return to clinic after the procedure    **Risks/benefits of the procedure were d/w the patient including bleeding, infection, ptx, etc.  Patient understands and wishes to proceed.  Consent was placed on the chart.

## 2018-10-24 NOTE — TELEPHONE ENCOUNTER
Switch from Lantus to Soliqua now.   Please start at 30 units once daily before breakfast. He needs to take a lower dose of the Soliqua because it could can cause nausea and it needs to get adjusted to it.     After a week, if blood sugars are still higher than 150, then increase to 36 units once daily.   Also, when glucoses are very different and unexpected, test BG again to confirm.

## 2018-10-24 NOTE — TELEPHONE ENCOUNTER
"----- Message from Veena Herr sent at 10/24/2018 12:03 PM CDT -----  Contact: Felicia Romero "sister" [phone]  He has abrasions on leg and foot pt was released from the hosp last week and his levels are still high. Pts. Sister wants to know if there is additional medication that he can take or increase the dosing of what he's using now.  "

## 2018-10-25 ENCOUNTER — TELEPHONE (OUTPATIENT)
Dept: ENDOCRINOLOGY | Facility: CLINIC | Age: 58
End: 2018-10-25

## 2018-10-25 ENCOUNTER — TELEPHONE (OUTPATIENT)
Dept: CARDIOLOGY | Facility: CLINIC | Age: 58
End: 2018-10-25

## 2018-10-25 NOTE — TELEPHONE ENCOUNTER
----- Message from Radu Coronel sent at 10/25/2018 10:51 AM CDT -----  Contact: Chloé Cantrell/[phone]  The patient returned the staff call.        Thank you

## 2018-10-25 NOTE — TELEPHONE ENCOUNTER
Called patient and informed him of providers instructions per note below. Patient verbalized understanding and will send a BG log in 2 weeks.

## 2018-10-25 NOTE — TELEPHONE ENCOUNTER
----- Message from Anum Mata sent at 10/25/2018  2:43 PM CDT -----  Contact: Wife  Patient's wife says patient has been checking his pharmacy for a fluid pill the doctor told him he would call in but it's not at the pharmacy yet. Please call at [phone].

## 2018-10-26 RX ORDER — FUROSEMIDE 20 MG/1
20 TABLET ORAL DAILY
Qty: 30 TABLET | Refills: 3 | Status: ON HOLD | OUTPATIENT
Start: 2018-10-26 | End: 2018-11-21 | Stop reason: HOSPADM

## 2018-10-29 ENCOUNTER — HOSPITAL ENCOUNTER (OUTPATIENT)
Dept: WOUND CARE | Facility: HOSPITAL | Age: 58
Discharge: HOME | End: 2018-10-29
Attending: INTERNAL MEDICINE
Payer: COMMERCIAL

## 2018-10-29 DIAGNOSIS — L03.116 LEFT LEG CELLULITIS: Primary | ICD-10-CM

## 2018-10-29 DIAGNOSIS — L97.522 NON-PRESSURE CHRONIC ULCER OF OTHER PART OF LEFT FOOT WITH FAT LAYER EXPOSED: ICD-10-CM

## 2018-10-29 DIAGNOSIS — L97.529 TYPE 2 DIABETES MELLITUS WITH LEFT DIABETIC FOOT ULCER: ICD-10-CM

## 2018-10-29 DIAGNOSIS — E11.621 TYPE 2 DIABETES MELLITUS WITH LEFT DIABETIC FOOT ULCER: ICD-10-CM

## 2018-10-29 PROCEDURE — 25000003 PHARM REV CODE 250

## 2018-10-29 PROCEDURE — 17250 CHEM CAUT OF GRANLTJ TISSUE: CPT | Performed by: FAMILY MEDICINE

## 2018-10-29 PROCEDURE — 99215 OFFICE O/P EST HI 40 MIN: CPT | Mod: 25,,, | Performed by: FAMILY MEDICINE

## 2018-10-29 PROCEDURE — 17250 CHEM CAUT OF GRANLTJ TISSUE: CPT | Mod: ,,, | Performed by: FAMILY MEDICINE

## 2018-10-29 RX ORDER — SULFAMETHOXAZOLE AND TRIMETHOPRIM 800; 160 MG/1; MG/1
1 TABLET ORAL 2 TIMES DAILY
Qty: 20 TABLET | Refills: 0 | Status: ON HOLD | OUTPATIENT
Start: 2018-10-29 | End: 2018-11-21 | Stop reason: HOSPADM

## 2018-10-30 ENCOUNTER — HOSPITAL ENCOUNTER (OUTPATIENT)
Dept: PREADMISSION TESTING | Facility: HOSPITAL | Age: 58
Discharge: HOME | End: 2018-10-30
Attending: INTERNAL MEDICINE
Payer: COMMERCIAL

## 2018-10-30 VITALS
BODY MASS INDEX: 30.78 KG/M2 | RESPIRATION RATE: 18 BRPM | DIASTOLIC BLOOD PRESSURE: 68 MMHG | SYSTOLIC BLOOD PRESSURE: 113 MMHG | WEIGHT: 215 LBS | HEART RATE: 70 BPM | TEMPERATURE: 97 F | OXYGEN SATURATION: 98 % | HEIGHT: 70 IN

## 2018-10-30 DIAGNOSIS — I50.42 CHRONIC COMBINED SYSTOLIC AND DIASTOLIC HEART FAILURE: ICD-10-CM

## 2018-10-30 DIAGNOSIS — E11.621 TYPE 2 DIABETES MELLITUS WITH LEFT DIABETIC FOOT ULCER: Primary | ICD-10-CM

## 2018-10-30 DIAGNOSIS — L97.529 TYPE 2 DIABETES MELLITUS WITH LEFT DIABETIC FOOT ULCER: Primary | ICD-10-CM

## 2018-10-30 LAB
ANION GAP SERPL CALC-SCNC: 9 MMOL/L
BASOPHILS # BLD AUTO: 0.02 K/UL
BASOPHILS NFR BLD: 0.2 %
BUN SERPL-MCNC: 44 MG/DL
CALCIUM SERPL-MCNC: 9.4 MG/DL
CHLORIDE SERPL-SCNC: 107 MMOL/L
CHOLEST SERPL-MCNC: 107 MG/DL
CHOLEST/HDLC SERPL: 4 {RATIO}
CO2 SERPL-SCNC: 20 MMOL/L
CREAT SERPL-MCNC: 1.3 MG/DL
DIFFERENTIAL METHOD: ABNORMAL
EOSINOPHIL # BLD AUTO: 0.1 K/UL
EOSINOPHIL NFR BLD: 1 %
ERYTHROCYTE [DISTWIDTH] IN BLOOD BY AUTOMATED COUNT: 17.6 %
EST. GFR  (AFRICAN AMERICAN): >60 ML/MIN/1.73 M^2
EST. GFR  (NON AFRICAN AMERICAN): 59 ML/MIN/1.73 M^2
ESTIMATED AVG GLUCOSE: 192 MG/DL
GLUCOSE SERPL-MCNC: 86 MG/DL
HBA1C MFR BLD HPLC: 8.3 %
HCT VFR BLD AUTO: 32.1 %
HDLC SERPL-MCNC: 27 MG/DL
HDLC SERPL: 25.2 %
HGB BLD-MCNC: 11 G/DL
INR PPP: 1.1
LDLC SERPL CALC-MCNC: 71.2 MG/DL
LYMPHOCYTES # BLD AUTO: 0.8 K/UL
LYMPHOCYTES NFR BLD: 7.9 %
MCH RBC QN AUTO: 31.7 PG
MCHC RBC AUTO-ENTMCNC: 34.3 G/DL
MCV RBC AUTO: 93 FL
MONOCYTES # BLD AUTO: 1 K/UL
MONOCYTES NFR BLD: 10.4 %
NEUTROPHILS # BLD AUTO: 7.7 K/UL
NEUTROPHILS NFR BLD: 80.5 %
NONHDLC SERPL-MCNC: 80 MG/DL
PLATELET # BLD AUTO: 395 K/UL
PMV BLD AUTO: 9.5 FL
POTASSIUM SERPL-SCNC: 5.8 MMOL/L
PROTHROMBIN TIME: 11.4 SEC
RBC # BLD AUTO: 3.47 M/UL
SODIUM SERPL-SCNC: 136 MMOL/L
TRIGL SERPL-MCNC: 44 MG/DL
WBC # BLD AUTO: 9.59 K/UL

## 2018-10-30 PROCEDURE — 85610 PROTHROMBIN TIME: CPT

## 2018-10-30 PROCEDURE — 80048 BASIC METABOLIC PNL TOTAL CA: CPT

## 2018-10-30 PROCEDURE — 93005 ELECTROCARDIOGRAM TRACING: CPT

## 2018-10-30 PROCEDURE — 83036 HEMOGLOBIN GLYCOSYLATED A1C: CPT

## 2018-10-30 PROCEDURE — 80061 LIPID PANEL: CPT

## 2018-10-30 PROCEDURE — 93010 ELECTROCARDIOGRAM REPORT: CPT | Mod: ,,, | Performed by: INTERNAL MEDICINE

## 2018-10-30 PROCEDURE — 85025 COMPLETE CBC W/AUTO DIFF WBC: CPT

## 2018-10-30 NOTE — DISCHARGE INSTRUCTIONS
Your surgery is scheduled for____11/2/2018_____________.    Call [phone] between 2 pm and 5 pm ___11/1/2018_________ to find out your arrival time for the day of surgery.    Report to SAME DAY SURGERY UNIT at _______am on the 2nd floor of the hospital.  Use the front entrance of the hospital.  The front doors of the hospital open promptly at 5:30 am.    If you need wheelchair assistance, call [phone] from your cell phone,  or call 0 from the courtesy phone in the hospital lobby.    Important instructions:   Do not eat or drink after 12 midnight, including water.  It is okay to brush your teeth.  Do not have gum, candy or mints.     Take your morning medications with small sips of water.    Do not take any diabetic medication on the morning of surgery unless instructed to do so by your doctor or pre op nurse.    Stop taking Aspirin, Ibuprofen, Motrin and Aleve , Fish oil, and Vitamin E for at least 7 days before your surgery. You may use Tylenol unless otherwise instructed by your doctor.           Please shower the night before and the morning of your surgery.        Use Hibiclens soap to your surgery site if instructed by your pre op nurse.   If your surgery is on your abdomen, be sure to wash your naval.  Be sure to rinse off Hibiclens after it is on your skin for several minutes.  Do not use Hibiclens on your face or genitals. Please place clean linens on your bed the night before surgery. Please wear fresh clean clothing after each shower.     No shaving of procedural area at least 4-5 days before surgery due to increased risk of skin irritation and/or possible infection.      You may be asked to take a third shower on arrival to Same Day Surgery depending on the type of surgery you are having.     You may wear deodorant only.      Do not wear powder, body lotion or cologne.     Do not wear any jewelry or have any metal on your body.     Please bring any documents given to you by your  doctor.     If you are going home on the same day of surgery, you must arrange for a family member or a friend to drive you home.  Public transportation is prohibited.    You will not be able to drive home if you were given anesthesia or sedation.     Wear loose fitting clothes allowing for bandages.     Please leave money and valuables home.       You may bring your cell phone.     Call the doctor if fever or illness should occur before your surgery.    Call [phone] to contact us here at [facility] if needed.

## 2018-10-31 NOTE — H&P
HPI  Patient is here for follow-up of coronary artery disease and systolic heart failure.  He recently was admitted the hospital for worsening volume overload and lower extremity wound infection.  He has been following in wound care clinic and was seen by vascular as well.  TBI was abnormal.  He denies any cardiopulmonary complaints after diuresis.  He is on adequate med therapy but was not resumed on diuretic on discharge.  He denies any current chest pain, shortness of breath or palpitations.  He has experienced no PND, orthopnea and has stable lower extremity edema.  He denies any dizziness, presyncope or syncope.  He is mainly weekend and wants to try and do physical rehabilitation but is limited currently with respect to his previous foot amputation in wounds.     Review of Systems   Constitution: Negative.   HENT: Negative.    Eyes: Negative.    Cardiovascular: Negative for chest pain, dyspnea on exertion, irregular heartbeat, leg swelling, near-syncope, orthopnea, palpitations, paroxysmal nocturnal dyspnea and syncope.   Respiratory: Positive for shortness of breath.    Skin: Positive for poor wound healing.   Musculoskeletal: Negative.    Gastrointestinal: Negative for abdominal pain, constipation and diarrhea.   Genitourinary: Negative for dysuria.   Neurological: Negative.  Negative for dizziness.   Psychiatric/Behavioral: Negative.       Objective:    Physical Exam   Constitutional: He is oriented to person, place, and time. He appears well-developed and well-nourished. No distress.   HENT:   Head: Normocephalic and atraumatic.   Eyes: Conjunctivae and EOM are normal. Pupils are equal, round, and reactive to light.   Neck: Normal range of motion. Neck supple. No thyromegaly present.   Cardiovascular: Normal rate, regular rhythm and normal heart sounds.   No murmur heard.  Pulmonary/Chest: Effort normal and breath sounds normal. No respiratory distress. He has no wheezes. He has no rales. He exhibits no  tenderness.   Abdominal: Soft. Bowel sounds are normal.   Musculoskeletal: He exhibits no edema.   Neurological: He is alert and oriented to person, place, and time.   Skin: Skin is warm and dry.   Psychiatric: He has a normal mood and affect. His behavior is normal.        Echo:  10-18  · Left ventricle ejection fraction is severely decreased at 25%  · Grade III (severe) left ventricular diastolic dysfunction consistent with restrictive physiology.  · LA pressure is elevated.  · Right ventricular cavity size is moderately dilated.  · Left atrium is severely dilated.  · The estimated PA systolic pressure is 59.85 mm Hg  · Pulmonary hypertension present.     NST:  · Abnormal myocardial perfusion study  · There is a moderate amount of infarct in the inferior wall(s).In the typical distribution of the RCA territory.  · Post Stress Ejection Fraction is 17 %     LHC:  B. Summary/Post-Operative Diagnosis       Three vessel coronary artery disease.    LV systolic and diastolic dysfunction.     E. Angiographic Results     Diagnostic:          Patient has a right dominant coronary artery.        - Left Main Coronary Artery:             The LM has luminal irregularities. There is BAKARI 3 flow.     - Left Anterior Descending Artery:             The proximal LAD has a 80% stenosis. There is BAKARI 3 flow.             The mid LAD has a 90% stenosis. There is BAKARI 3 flow. The remaining portion of the vessel is diffusely diseased.     - Left Circumflex Artery:             The mid LCX has a 60% stenosis. There is BAKARI 3 flow. The remaining portion of the vessel has luminal irregularities.     - Right Coronary Artery:             The proximal RCA has a 60% stenosis. There is BAKARI 3 flow.             The mid RCA has a 80% stenosis. There is BAKARI 3 flow. The remaining portion of the vessel has luminal irregularities.     - Posterior Descending Artery:             The proximal PDA has a 60% stenosis. There is BAKARI 3 flow. The remaining  portion of the vessel has luminal irregularities.     - Radial:             The radial.        Assessment:       1. Chronic combined systolic and diastolic heart failure    2. Coronary artery disease involving native coronary artery of native heart without angina pectoris    3. Essential hypertension    4. Mixed hyperlipidemia    5. PVD (peripheral vascular disease)    6. DM type 2 with diabetic peripheral neuropathy    7. Stage 3 chronic kidney disease       Plan:       -continue med therapy  -qualifies for ICD placement single-chamber for ischemic dilated cardiomyopathy (QRS less than 120 milliseconds, life expectancy greater than 1 year, near card Association class 3)  -add low-dose maintenance Lasix  -limb ischemia followed by vascular  -follow-up with wound care clinic     Return to clinic after the procedure     **Risks/benefits of the procedure were d/w the patient including bleeding, infection, ptx, etc.  Patient understands and wishes to proceed.  Consent was placed on the chart.

## 2018-11-02 ENCOUNTER — HOSPITAL ENCOUNTER (OUTPATIENT)
Facility: HOSPITAL | Age: 58
Discharge: HOME HEALTH | DRG: 245 | End: 2018-11-03
Attending: INTERNAL MEDICINE | Admitting: INTERNAL MEDICINE
Payer: COMMERCIAL

## 2018-11-02 DIAGNOSIS — I50.42 CHRONIC COMBINED SYSTOLIC AND DIASTOLIC HEART FAILURE: Primary | ICD-10-CM

## 2018-11-02 LAB
POCT GLUCOSE: 103 MG/DL (ref 70–110)
POCT GLUCOSE: 121 MG/DL (ref 70–110)
POCT GLUCOSE: 62 MG/DL (ref 70–110)

## 2018-11-02 PROCEDURE — 25000003 PHARM REV CODE 250: Performed by: INTERNAL MEDICINE

## 2018-11-02 PROCEDURE — 63700000 PHARM REV CODE 250 ALT 637 W/O HCPCS: Performed by: INTERNAL MEDICINE

## 2018-11-02 PROCEDURE — 0JH60PZ INSERTION OF CARDIAC RHYTHM RELATED DEVICE INTO CHEST SUBCUTANEOUS TISSUE AND FASCIA, OPEN APPROACH: ICD-10-PCS | Performed by: INTERNAL MEDICINE

## 2018-11-02 PROCEDURE — 21400001 HC TELEMETRY ROOM

## 2018-11-02 PROCEDURE — 99152 MOD SED SAME PHYS/QHP 5/>YRS: CPT | Mod: ,,, | Performed by: INTERNAL MEDICINE

## 2018-11-02 PROCEDURE — 93010 ELECTROCARDIOGRAM REPORT: CPT | Mod: ,,, | Performed by: INTERNAL MEDICINE

## 2018-11-02 PROCEDURE — 93005 ELECTROCARDIOGRAM TRACING: CPT

## 2018-11-02 PROCEDURE — C1894 INTRO/SHEATH, NON-LASER: HCPCS | Performed by: INTERNAL MEDICINE

## 2018-11-02 PROCEDURE — 33249 INSJ/RPLCMT DEFIB W/LEAD(S): CPT | Mod: ,,, | Performed by: INTERNAL MEDICINE

## 2018-11-02 PROCEDURE — 33249 INSJ/RPLCMT DEFIB W/LEAD(S): CPT | Performed by: INTERNAL MEDICINE

## 2018-11-02 PROCEDURE — 63600175 PHARM REV CODE 636 W HCPCS: Performed by: INTERNAL MEDICINE

## 2018-11-02 PROCEDURE — 27201423 OPTIME MED/SURG SUP & DEVICES STERILE SUPPLY: Performed by: INTERNAL MEDICINE

## 2018-11-02 PROCEDURE — 0JH608Z INSERTION OF DEFIBRILLATOR GENERATOR INTO CHEST SUBCUTANEOUS TISSUE AND FASCIA, OPEN APPROACH: ICD-10-PCS | Performed by: INTERNAL MEDICINE

## 2018-11-02 PROCEDURE — 99153 MOD SED SAME PHYS/QHP EA: CPT | Performed by: INTERNAL MEDICINE

## 2018-11-02 PROCEDURE — 99152 MOD SED SAME PHYS/QHP 5/>YRS: CPT | Performed by: INTERNAL MEDICINE

## 2018-11-02 PROCEDURE — C1777 LEAD, AICD, ENDO SINGLE COIL: HCPCS | Performed by: INTERNAL MEDICINE

## 2018-11-02 PROCEDURE — C1722 AICD, SINGLE CHAMBER: HCPCS | Performed by: INTERNAL MEDICINE

## 2018-11-02 DEVICE — TIERED-THERAPY CARDIOVERTER/DEFIBRILLATOR VVEV VVIR
Type: IMPLANTABLE DEVICE | Site: CHEST | Status: FUNCTIONAL
Brand: ELLIPSE™

## 2018-11-02 DEVICE — DEFIBRILLATION LEAD
Type: IMPLANTABLE DEVICE | Site: CHEST | Status: FUNCTIONAL
Brand: DURATA™

## 2018-11-02 RX ORDER — MIDAZOLAM HYDROCHLORIDE 1 MG/ML
INJECTION INTRAMUSCULAR; INTRAVENOUS
Status: DISCONTINUED | OUTPATIENT
Start: 2018-11-02 | End: 2018-11-02 | Stop reason: HOSPADM

## 2018-11-02 RX ORDER — FENTANYL CITRATE 50 UG/ML
INJECTION, SOLUTION INTRAMUSCULAR; INTRAVENOUS
Status: DISCONTINUED | OUTPATIENT
Start: 2018-11-02 | End: 2018-11-02 | Stop reason: HOSPADM

## 2018-11-02 RX ORDER — ACETAMINOPHEN 10 MG/ML
1000 INJECTION, SOLUTION INTRAVENOUS ONCE
Status: COMPLETED | OUTPATIENT
Start: 2018-11-02 | End: 2018-11-02

## 2018-11-02 RX ORDER — BENAZEPRIL HYDROCHLORIDE 40 MG/1
40 TABLET ORAL DAILY
Status: DISCONTINUED | OUTPATIENT
Start: 2018-11-03 | End: 2018-11-03 | Stop reason: HOSPADM

## 2018-11-02 RX ORDER — KETOROLAC TROMETHAMINE 5 MG/ML
1 SOLUTION OPHTHALMIC 3 TIMES DAILY
Status: DISCONTINUED | OUTPATIENT
Start: 2018-11-02 | End: 2018-11-03 | Stop reason: HOSPADM

## 2018-11-02 RX ORDER — VANCOMYCIN HCL IN 5 % DEXTROSE 1G/250ML
1000 PLASTIC BAG, INJECTION (ML) INTRAVENOUS
Status: COMPLETED | OUTPATIENT
Start: 2018-11-02 | End: 2018-11-03

## 2018-11-02 RX ORDER — AMLODIPINE BESYLATE 5 MG/1
5 TABLET ORAL DAILY
Status: DISCONTINUED | OUTPATIENT
Start: 2018-11-03 | End: 2018-11-03 | Stop reason: HOSPADM

## 2018-11-02 RX ORDER — VANCOMYCIN HCL IN 5 % DEXTROSE 1G/250ML
1000 PLASTIC BAG, INJECTION (ML) INTRAVENOUS
Status: DISCONTINUED | OUTPATIENT
Start: 2018-11-02 | End: 2018-11-02 | Stop reason: HOSPADM

## 2018-11-02 RX ORDER — BRIMONIDINE TARTRATE 1 MG/ML
1 SOLUTION/ DROPS OPHTHALMIC 3 TIMES DAILY
Status: DISCONTINUED | OUTPATIENT
Start: 2018-11-02 | End: 2018-11-03 | Stop reason: HOSPADM

## 2018-11-02 RX ORDER — EZETIMIBE 10 MG/1
10 TABLET ORAL DAILY
Status: DISCONTINUED | OUTPATIENT
Start: 2018-11-02 | End: 2018-11-03 | Stop reason: HOSPADM

## 2018-11-02 RX ORDER — ACETAMINOPHEN 10 MG/ML
1000 INJECTION, SOLUTION INTRAVENOUS EVERY 8 HOURS
Status: DISCONTINUED | OUTPATIENT
Start: 2018-11-02 | End: 2018-11-02

## 2018-11-02 RX ORDER — FUROSEMIDE 20 MG/1
20 TABLET ORAL DAILY
Status: DISCONTINUED | OUTPATIENT
Start: 2018-11-02 | End: 2018-11-03 | Stop reason: HOSPADM

## 2018-11-02 RX ORDER — CARVEDILOL 6.25 MG/1
6.25 TABLET ORAL 2 TIMES DAILY
Status: DISCONTINUED | OUTPATIENT
Start: 2018-11-02 | End: 2018-11-03 | Stop reason: HOSPADM

## 2018-11-02 RX ORDER — SPIRONOLACTONE 100 MG/1
100 TABLET, FILM COATED ORAL DAILY
Status: DISCONTINUED | OUTPATIENT
Start: 2018-11-02 | End: 2018-11-03 | Stop reason: HOSPADM

## 2018-11-02 RX ORDER — HYDROCODONE BITARTRATE AND ACETAMINOPHEN 5; 325 MG/1; MG/1
1 TABLET ORAL EVERY 4 HOURS PRN
Status: DISCONTINUED | OUTPATIENT
Start: 2018-11-02 | End: 2018-11-03 | Stop reason: HOSPADM

## 2018-11-02 RX ORDER — PREDNISOLONE ACETATE 10 MG/ML
1 SUSPENSION/ DROPS OPHTHALMIC 3 TIMES DAILY
Status: DISCONTINUED | OUTPATIENT
Start: 2018-11-02 | End: 2018-11-03 | Stop reason: HOSPADM

## 2018-11-02 RX ADMIN — PREDNISOLONE ACETATE 1 DROP: 10 SUSPENSION OPHTHALMIC at 03:11

## 2018-11-02 RX ADMIN — PREDNISOLONE ACETATE 1 DROP: 10 SUSPENSION OPHTHALMIC at 08:11

## 2018-11-02 RX ADMIN — VANCOMYCIN HYDROCHLORIDE 1000 MG: 1 INJECTION, POWDER, LYOPHILIZED, FOR SOLUTION INTRAVENOUS at 08:11

## 2018-11-02 RX ADMIN — KETOROLAC TROMETHAMINE 1 DROP: 5 SOLUTION/ DROPS OPHTHALMIC at 08:11

## 2018-11-02 RX ADMIN — ACETAMINOPHEN 1000 MG: 10 INJECTION, SOLUTION INTRAVENOUS at 07:11

## 2018-11-02 RX ADMIN — BRIMONIDINE TARTRATE 1 DROP: 1 SOLUTION/ DROPS OPHTHALMIC at 03:11

## 2018-11-02 RX ADMIN — CARVEDILOL 6.25 MG: 6.25 TABLET, FILM COATED ORAL at 08:11

## 2018-11-02 RX ADMIN — BRIMONIDINE TARTRATE 1 DROP: 1 SOLUTION/ DROPS OPHTHALMIC at 08:11

## 2018-11-02 RX ADMIN — KETOROLAC TROMETHAMINE 1 DROP: 5 SOLUTION/ DROPS OPHTHALMIC at 03:11

## 2018-11-02 RX ADMIN — HYDROCODONE BITARTRATE AND ACETAMINOPHEN 1 TABLET: 5; 325 TABLET ORAL at 10:11

## 2018-11-02 RX ADMIN — HYDROCODONE BITARTRATE AND ACETAMINOPHEN 1 TABLET: 5; 325 TABLET ORAL at 03:11

## 2018-11-02 RX ADMIN — HYDROCODONE BITARTRATE AND ACETAMINOPHEN 1 TABLET: 5; 325 TABLET ORAL at 11:11

## 2018-11-02 NOTE — CONSULTS
A 58 year old male admitted 11/2/18 from home with chronic combined systolic and diastolic heart failure; CAD; essential hypertension; milxed hyperlipidemia; PVD; DM II with diabetic peripheral neuropathy; Stage 3 chronic kidney disease  [facility] of [facility] for [facility] care + [facility]  [facility] reports patient requiring frequent dressing changes  11/2 Insertion ICD per Dr. Greer  10/30 WBC 9.59 Hgb 11.0 Hct 32.1 A1C 8.3  Nursing consult for left foot and left leg  Assessment:  Photodocumentation    Right anterior lower leg- 3 cm linear wound with serous drainage    Left dorsal foot- Drying wounds with moderate amount serous drainage. Painted with gentian violet during visit to [facility].    Left plantar wound- Drying wounds with small amount serous drainage.   Left leg much improved with healing of open wounds. Left leg with small amount edema.   Treatment:  Replaced Aquacel Extra Ag dressing to draining wounds. Covered with ABD pad and secured with Kerlix roll.   Covered Aquacel Extra Ag on right anterior lower leg with Aquacel lite foam dressing.   Patient to continue follow up in [facility]. Had appointment on Monday, 11/5 with Dr. Dunbar, but will need to be rescheduled since admission to hospital.   Will inform patient of change in appointment.   If patient not discharged home tomorrow, recommend daily wound care to leg/foot wounds with Aquacel Extra Ag.

## 2018-11-02 NOTE — BRIEF OP NOTE
[facility]  Brief Operative Note    SUMMARY     Surgery Date: 11/2/2018     Surgeon(s) and Role:     * Pernell Greer MD - Primary    Assisting Surgeon: None    Pre-op Diagnosis:  Chronic combined systolic and diastolic heart failure [I50.42]    Post-op Diagnosis:  Post-Op Diagnosis Codes:     * Chronic combined systolic and diastolic heart failure [I50.42]    Procedure(s) (LRB):  INSERTION, ICD GENERATOR, SINGLE CHAMBER (N/A)    Anesthesia: RN IV Sedation    Description of Procedure: Single CH ICD    Description of the findings of the procedure: Successful placement of ICD via L SCV    Estimated Blood Loss: 50 cc         Specimens:   Specimen (12h ago, onward)    None

## 2018-11-03 VITALS
DIASTOLIC BLOOD PRESSURE: 56 MMHG | HEART RATE: 65 BPM | HEIGHT: 70 IN | OXYGEN SATURATION: 92 % | TEMPERATURE: 99 F | SYSTOLIC BLOOD PRESSURE: 103 MMHG | BODY MASS INDEX: 31.31 KG/M2 | RESPIRATION RATE: 18 BRPM | WEIGHT: 218.69 LBS

## 2018-11-03 LAB — POCT GLUCOSE: 183 MG/DL (ref 70–110)

## 2018-11-03 PROCEDURE — 25000003 PHARM REV CODE 250: Performed by: INTERNAL MEDICINE

## 2018-11-03 PROCEDURE — 99238 HOSP IP/OBS DSCHRG MGMT 30/<: CPT | Mod: ,,, | Performed by: INTERNAL MEDICINE

## 2018-11-03 PROCEDURE — 63600175 PHARM REV CODE 636 W HCPCS: Performed by: INTERNAL MEDICINE

## 2018-11-03 RX ADMIN — SPIRONOLACTONE 100 MG: 100 TABLET, FILM COATED ORAL at 08:11

## 2018-11-03 RX ADMIN — AMLODIPINE BESYLATE 5 MG: 5 TABLET ORAL at 08:11

## 2018-11-03 RX ADMIN — BENAZEPRIL HYDROCHLORIDE 40 MG: 40 TABLET, COATED ORAL at 08:11

## 2018-11-03 RX ADMIN — KETOROLAC TROMETHAMINE 1 DROP: 5 SOLUTION/ DROPS OPHTHALMIC at 08:11

## 2018-11-03 RX ADMIN — PREDNISOLONE ACETATE 1 DROP: 10 SUSPENSION OPHTHALMIC at 08:11

## 2018-11-03 RX ADMIN — EZETIMIBE 10 MG: 10 TABLET ORAL at 08:11

## 2018-11-03 RX ADMIN — HYDROCODONE BITARTRATE AND ACETAMINOPHEN 1 TABLET: 5; 325 TABLET ORAL at 07:11

## 2018-11-03 RX ADMIN — FUROSEMIDE 20 MG: 20 TABLET ORAL at 08:11

## 2018-11-03 RX ADMIN — CARVEDILOL 6.25 MG: 6.25 TABLET, FILM COATED ORAL at 08:11

## 2018-11-03 RX ADMIN — VANCOMYCIN HYDROCHLORIDE 1000 MG: 1 INJECTION, POWDER, LYOPHILIZED, FOR SOLUTION INTRAVENOUS at 08:11

## 2018-11-03 RX ADMIN — BRIMONIDINE TARTRATE 1 DROP: 1 SOLUTION/ DROPS OPHTHALMIC at 08:11

## 2018-11-03 NOTE — NURSING
Report received from Mary Mccabe, care assumed. Pt lying in bed with sling and swathe to left arm. Pt denies any chest pain or pain to left chest incision. Small amount of marked drainage noted to left chest wall dressing . C/o  pain to left foot. Pain medication administered per prn order. Dressing change per pt request.moderate amount of drainage noted.pt aaox4. resp even and unlabored. In no acute distress. Will continue to monitor.

## 2018-11-03 NOTE — PLAN OF CARE
11/03/18 0926   Discharge Assessment   Assessment Type Discharge Planning Assessment   Confirmed/corrected address and phone number on facesheet? Yes   Assessment information obtained from? Patient;Caregiver  (Friend-Cholé)   Communicated expected length of stay with patient/caregiver no   Prior to hospitilization cognitive status: Alert/Oriented   Prior to hospitalization functional status: Assistive Equipment;Needs Assistance  (Lives with friendMarianne who assists; has [facility]; uses wheelchair )   Current cognitive status: Alert/Oriented   Current Functional Status: Assistive Equipment;Needs Assistance  (Friend Chloé assists; uses wheelchair; has [facility]; desires standard walker and BSC upon discharge)   Facility Arrived From: Home   Lives With friend(s)  (Lives with friend-Chloé)   Able to Return to Prior Arrangements yes   Is patient able to care for self after discharge? Yes  (With friend's assist and DME)   Who are your caregiver(s) and their phone number(s)? Chloé-friend: [phone]   Patient's perception of discharge disposition home health  (Has [facility])   Readmission Within The Last 30 Days planned readmission   If yes, most recent facility name: [facility]   Patient currently being followed by outpatient case management? No   Patient currently receives any other outside agency services? No   Equipment Currently Used at Home wheelchair   Do you have any problems affording any of your prescribed medications? No   Is the patient taking medications as prescribed? yes   Does the patient have transportation home? Yes   Transportation Available family or friend will provide   Does the patient receive services at the [facility]? No   Discharge Plan A Home with family;Home Health  (Home with friend; resume [facility])   Discharge Plan B Other  (TBD)   Patient/Family In Agreement With Plan yes   Readmission Questionnaire   At the time of your discharge, did someone talk to you about what your health problems were? Yes    At the time of discharge, did someone talk to you about what to watch out for regarding worsening of your health problem? Yes   At the time of discharge, did someone talk to you about what to do if you experienced worsening of your health problem? Yes   At the time of discharge, did someone talk to you about which medication to take when you left the hospital and which ones to stop taking? Yes   At the time of discharge, did someone talk to you about when and where to follow up with a doctor after you left the hospital? Yes   What do you believe caused you to be sick enough to be re-admitted? Unknown   How often do you need to have someone help you when you read instructions, pamphlets, or other written material from your doctor or pharmacy? Sometimes   Do you have problems taking your medications as prescribed? No   Do you have any problems affording any of  your prescribed medications? No   Do you have problems obtaining/receiving your medications? No   Does the patient have transportation to healthcare appointments? Yes   Living Arrangements house   Does the patient have family/friends to help with healtcare needs after discharge? yes   Does your caregiver provide all the help you need? Yes   If no, what kind of help do you need at home? Assist at times when in pain or weak; assist wtih transport   Are you currently feeling confused? No   Are you currently having problems thinking? No   Are you currently having memory problems? No     Patient is mostly independent; lives at friend-Chloé's home; Chloé assists as needed and transports; patient is current with [facility] ; uses wheelchair; needs to re-start out-patient wound care with [facility]-will contact Monday; requested consult placed to trigger clinic to call patient to set up appointment; desires standard walker and BSC upon discharge and to resume HH.    PCP: Rubén Davis MD     Extended Emergency Contact Information  Primary Emergency  "Contact: Chloé Menjivar   [facility]  Home Phone: [phone]  Relation: Friend     [facility] [id number] - [address] - [facility] [place], [place] [facility] AT [facility] [place], [place]  [address] [address]  Phone: [phone] Fax: [phone]  Payor: BLUE CROSS BLUE SHIELD / Plan: BLUE CONNECT / Product Type: HMO /      LCSW/Patient: Discharge Planning Review   LCSW to patient's room to discuss: Help at Home and who helps the patient manage care at home   LCSW explained role to patient   "Discharge planning begins on Admission" brochure discussed and placed in "My Health Packet" at bedside   LCSW's name and contact info placed on white board; along with support person at home   LCSW discussed patient preferences for providers if services or equipment is ordered   1)  Warning signs/symptoms information reviewed with and provided to patient in blue folder  2)  Discharge planning begins upon admission   3)  Discussed and reinforced patient responsibility for managing health care at home including:        a) Acquiring prescribed medications; b) following-through with scheduled follow-up appointments or scheduling those that could not be set; and c) monitoring health at home.       "

## 2018-11-03 NOTE — PLAN OF CARE
Problem: Patient Care Overview  Goal: Interdisciplinary Rounds/Family Conf  Patient educated on post op ICD placement such has monitoring site for s/sx of infection such as redness, swelling ore erythema. Patient also educated not to lift arm above level of the head. Verbalized understanding throught teach back.

## 2018-11-03 NOTE — NURSING
Discharge instructions given to pt and his sister. Both  verbalized understanding. Iv removed with catheter intact. Pt aaox4. resp even and unlabored. In no acute distress.

## 2018-11-03 NOTE — DISCHARGE SUMMARY
[facility] Hospital Medicine  Discharge Summary      Patient Name: Marvin Ray  MRN: [id number]  Admission Date: 11/2/2018  Hospital Length of Stay: 1 days  Discharge Date and Time:  11/03/2018 1:08 PM  Attending Physician: Pernell Greer MD   Discharging Provider: Pernell Greer MD  Primary Care Provider: Rubén Davis MD        HPI:  Patient is a 58-year-old male with history of ischemic dilated cardiomyopathy was brought in electively for ICD placement.  See admission H&P for full details.    Procedure(s) (LRB):  INSERTION, ICD GENERATOR, SINGLE CHAMBER (N/A)      Hospital Course:  ICD electively placed without complications.  He is is urged overnight and receive 2 more doses antibiotics.  He did not request pain medicine on discharge.  He will continue to follow up with wound care clinic.  He is to continue all of his current medicines for GDMT.  Follow-up for staple removal in 1 week.  He is to wear his left arm sling until that time.    Consults:  none    Final Active Diagnoses:    Diagnosis Date Noted POA    PRINCIPAL PROBLEM:  Chronic combined systolic and diastolic heart failure [I50.42] 10/16/2018 Yes     Chronic      Problems Resolved During this Admission:      Discharged Condition: good    Disposition:  home    Follow Up:  Follow-up Information     Pernell Greer MD In 1 week.    Specialties:  INTERVENTIONAL CARDIOLOGY, Cardiology  Contact information:  [address] [place] [address] [id number] [phone]                 Patient Instructions:   Diet cardiac  Activity as tolerated  Routine post implant instructions given    Medications:  Reconciled Home Medications:      Medication List      CHANGE how you take these medications    fish oil-omega-3 fatty acids 300-1,000 mg capsule  Take 2 capsules (2 g total) by mouth 2 (two) times daily.  What changed:  how much to take        CONTINUE taking these medications    allopurinol 100 MG tablet  Commonly known as:   "ZYLOPRIM  Take 2 tablets (200 mg total) by mouth once daily.     amLODIPine 5 MG tablet  Commonly known as:  NORVASC  TAKE 1 TABLET(5 MG) BY MOUTH EVERY DAY     aspirin 81 MG Chew  Take 81 mg by mouth once daily.     benazepril 40 MG tablet  Commonly known as:  LOTENSIN  TAKE 1 TABLET(40 MG) BY MOUTH TWICE DAILY     brimonidine 0.1% 0.1 % Drop  Commonly known as:  ALPHAGAN P  Place 1 drop into both eyes 3 (three) times daily.     carvedilol 6.25 MG tablet  Commonly known as:  COREG  Take 1 tablet (6.25 mg total) by mouth 2 (two) times daily.     coenzyme Q10 100 mg capsule  Take 100 mg by mouth every morning.     dorzolamide-timolol 2-0.5% 22.3-6.8 mg/mL ophthalmic solution  Commonly known as:  COSOPT  Place 1 drop into both eyes 3 (three) times daily.     ezetimibe 10 mg tablet  Commonly known as:  ZETIA  Take 1 tablet (10 mg total) by mouth once daily.     furosemide 20 MG tablet  Commonly known as:  LASIX  Take 1 tablet (20 mg total) by mouth once daily.     insulin glargine-lixisenatide 100 unit-33 mcg/mL Inpn  Commonly known as:  SOLIQUA 100/33  Inject 34 units once daily     ketorolac 0.5% 0.5 % Drop  Commonly known as:  ACULAR  Place 1 drop into the right eye 3 (three) times daily.     multivitamin Tab  Take 1 tablet by mouth every morning.     pen needle, diabetic 31 gauge x 1/4" Ndle  Use as directed     prednisoLONE acetate 1 % Drps  Commonly known as:  PRED FORTE  Place 1 drop into the right eye 3 (three) times daily.     spironolactone 100 MG tablet  Commonly known as:  ALDACTONE  Take 1 tablet (100 mg total) by mouth once daily.     sulfamethoxazole-trimethoprim 800-160mg 800-160 mg Tab  Commonly known as:  BACTRIM DS  Take 1 tablet by mouth 2 (two) times daily. for 10 days            Significant Diagnostic Studies: Labs: BMP: No results for input(s): GLU, NA, K, CL, CO2, BUN, CREATININE, CALCIUM, MG in the last 48 hours.    Pending Diagnostic Studies:     Procedure Component Value Units Date/Time    " EKG 12-lead [[id number]]     Order Status:  Sent Lab Status:  No result         Indwelling Lines/Drains at time of discharge:   Lines/Drains/Airways          None          Time spent on the discharge of patient: 30 minutes  Patient was seen and examined on the date of discharge and determined to be suitable for discharge.         Pernell Greer MD  Department of Hospital Medicine  [facility]

## 2018-11-05 ENCOUNTER — HOSPITAL ENCOUNTER (OUTPATIENT)
Dept: WOUND CARE | Facility: HOSPITAL | Age: 58
Discharge: HOME | End: 2018-11-05
Attending: FAMILY MEDICINE
Payer: COMMERCIAL

## 2018-11-05 ENCOUNTER — HOSPITAL ENCOUNTER (OUTPATIENT)
Dept: RADIOLOGY | Facility: HOSPITAL | Age: 58
Discharge: HOME | End: 2018-11-05
Attending: FAMILY MEDICINE
Payer: COMMERCIAL

## 2018-11-05 DIAGNOSIS — L97.529 TYPE 2 DIABETES MELLITUS WITH LEFT DIABETIC FOOT ULCER: ICD-10-CM

## 2018-11-05 DIAGNOSIS — L97.522 NON-PRESSURE CHRONIC ULCER OF OTHER PART OF LEFT FOOT WITH FAT LAYER EXPOSED: Primary | ICD-10-CM

## 2018-11-05 DIAGNOSIS — I87.2 VENOUS INSUFFICIENCY: Primary | ICD-10-CM

## 2018-11-05 DIAGNOSIS — E11.621 TYPE 2 DIABETES MELLITUS WITH LEFT DIABETIC FOOT ULCER: ICD-10-CM

## 2018-11-05 DIAGNOSIS — L97.529 ULCER OF FOOT, CHRONIC, LEFT, WITH UNSPECIFIED SEVERITY: ICD-10-CM

## 2018-11-05 DIAGNOSIS — I87.2 VENOUS INSUFFICIENCY: ICD-10-CM

## 2018-11-05 PROCEDURE — 93970 EXTREMITY STUDY: CPT | Mod: TC

## 2018-11-05 PROCEDURE — 99215 OFFICE O/P EST HI 40 MIN: CPT | Mod: ,,, | Performed by: FAMILY MEDICINE

## 2018-11-05 PROCEDURE — 93970 EXTREMITY STUDY: CPT | Mod: 26,,, | Performed by: RADIOLOGY

## 2018-11-05 PROCEDURE — 99215 OFFICE O/P EST HI 40 MIN: CPT | Performed by: FAMILY MEDICINE

## 2018-11-06 ENCOUNTER — TELEPHONE (OUTPATIENT)
Dept: ADMINISTRATIVE | Facility: CLINIC | Age: 58
End: 2018-11-06

## 2018-11-06 ENCOUNTER — TELEPHONE (OUTPATIENT)
Dept: ENDOCRINOLOGY | Facility: CLINIC | Age: 58
End: 2018-11-06

## 2018-11-06 DIAGNOSIS — E11.42 DM TYPE 2 WITH DIABETIC PERIPHERAL NEUROPATHY: Primary | Chronic | ICD-10-CM

## 2018-11-06 NOTE — TELEPHONE ENCOUNTER
bg log received. Continue Soliqua 34 units daily for now. If blood sugar is lower than 90, please reduce to 30 units. Reschedule visit from today to 2 mo from now with a1c prior.

## 2018-11-06 NOTE — PROGRESS NOTES
Home Health SOC with [facility] - Dr. Aidee Lynch. Pt. Received SN services.

## 2018-11-08 ENCOUNTER — HOSPITAL ENCOUNTER (INPATIENT)
Facility: HOSPITAL | Age: 58
LOS: 13 days | Discharge: HOME HEALTH | DRG: 579 | End: 2018-11-21
Attending: EMERGENCY MEDICINE | Admitting: EMERGENCY MEDICINE
Payer: COMMERCIAL

## 2018-11-08 ENCOUNTER — OFFICE VISIT (OUTPATIENT)
Dept: VASCULAR SURGERY | Facility: CLINIC | Age: 58
End: 2018-11-08
Payer: COMMERCIAL

## 2018-11-08 VITALS
BODY MASS INDEX: 31.21 KG/M2 | SYSTOLIC BLOOD PRESSURE: 116 MMHG | WEIGHT: 218 LBS | DIASTOLIC BLOOD PRESSURE: 62 MMHG | HEIGHT: 70 IN

## 2018-11-08 DIAGNOSIS — E87.20 METABOLIC ACIDOSIS: ICD-10-CM

## 2018-11-08 DIAGNOSIS — I73.9 PVD (PERIPHERAL VASCULAR DISEASE): ICD-10-CM

## 2018-11-08 DIAGNOSIS — L03.116 CELLULITIS OF LEFT LOWER EXTREMITY: ICD-10-CM

## 2018-11-08 DIAGNOSIS — L03.90 CELLULITIS: ICD-10-CM

## 2018-11-08 DIAGNOSIS — S81.802D MULTIPLE OPEN WOUNDS OF LOWER LEG, LEFT, SUBSEQUENT ENCOUNTER: ICD-10-CM

## 2018-11-08 DIAGNOSIS — G62.9 PERIPHERAL NERVE DISORDER: Primary | ICD-10-CM

## 2018-11-08 DIAGNOSIS — I73.9 PVD (PERIPHERAL VASCULAR DISEASE): Primary | ICD-10-CM

## 2018-11-08 DIAGNOSIS — I73.9 PERIPHERAL VASCULAR DISEASE: ICD-10-CM

## 2018-11-08 DIAGNOSIS — I10 ESSENTIAL HYPERTENSION: ICD-10-CM

## 2018-11-08 DIAGNOSIS — E87.5 HYPERKALEMIA: ICD-10-CM

## 2018-11-08 DIAGNOSIS — S91.302D OPEN WOUND OF LEFT FOOT, SUBSEQUENT ENCOUNTER: Primary | ICD-10-CM

## 2018-11-08 DIAGNOSIS — I70.244 ATHEROSCLEROSIS OF NATIVE ARTERY OF LEFT LOWER EXTREMITY WITH ULCERATION OF MIDFOOT: Primary | ICD-10-CM

## 2018-11-08 DIAGNOSIS — N17.9 ACUTE KIDNEY INJURY: ICD-10-CM

## 2018-11-08 PROBLEM — S91.302A OPEN WOUND OF LEFT FOOT: Status: ACTIVE | Noted: 2018-11-08

## 2018-11-08 LAB
ALBUMIN SERPL BCP-MCNC: 2.4 G/DL
ALLENS TEST: ABNORMAL
ALP SERPL-CCNC: 337 U/L
ALT SERPL W/O P-5'-P-CCNC: 33 U/L
ANION GAP SERPL CALC-SCNC: 5 MMOL/L
ANION GAP SERPL CALC-SCNC: 7 MMOL/L
APTT BLDCRRT: 27.5 SEC
AST SERPL-CCNC: 24 U/L
BASOPHILS # BLD AUTO: 0.02 K/UL
BASOPHILS NFR BLD: 0.2 %
BILIRUB SERPL-MCNC: 0.8 MG/DL
BUN SERPL-MCNC: 53 MG/DL
BUN SERPL-MCNC: 55 MG/DL
CALCIUM SERPL-MCNC: 9.1 MG/DL
CALCIUM SERPL-MCNC: 9.3 MG/DL
CHLORIDE SERPL-SCNC: 109 MMOL/L
CHLORIDE SERPL-SCNC: 109 MMOL/L
CO2 SERPL-SCNC: 16 MMOL/L
CO2 SERPL-SCNC: 20 MMOL/L
CREAT SERPL-MCNC: 1.5 MG/DL
CREAT SERPL-MCNC: 1.6 MG/DL
DELSYS: ABNORMAL
DIFFERENTIAL METHOD: ABNORMAL
EOSINOPHIL # BLD AUTO: 0.1 K/UL
EOSINOPHIL NFR BLD: 1.4 %
ERYTHROCYTE [DISTWIDTH] IN BLOOD BY AUTOMATED COUNT: 17 %
EST. GFR  (AFRICAN AMERICAN): 53 ML/MIN/1.73 M^2
EST. GFR  (AFRICAN AMERICAN): 58 ML/MIN/1.73 M^2
EST. GFR  (NON AFRICAN AMERICAN): 46 ML/MIN/1.73 M^2
EST. GFR  (NON AFRICAN AMERICAN): 50 ML/MIN/1.73 M^2
FIO2: 21
FLOW: 0
GLUCOSE SERPL-MCNC: 70 MG/DL
GLUCOSE SERPL-MCNC: 77 MG/DL
HCO3 UR-SCNC: 16.6 MMOL/L (ref 24–28)
HCT VFR BLD AUTO: 32.3 %
HGB BLD-MCNC: 11 G/DL
INR PPP: 1.1
LACTATE SERPL-SCNC: 1.3 MMOL/L
LYMPHOCYTES # BLD AUTO: 0.5 K/UL
LYMPHOCYTES NFR BLD: 5.3 %
MCH RBC QN AUTO: 31.2 PG
MCHC RBC AUTO-ENTMCNC: 34.1 G/DL
MCV RBC AUTO: 92 FL
MODE: ABNORMAL
MONOCYTES # BLD AUTO: 1 K/UL
MONOCYTES NFR BLD: 10.3 %
NEUTROPHILS # BLD AUTO: 8.4 K/UL
NEUTROPHILS NFR BLD: 83.2 %
PCO2 BLDA: 28.2 MMHG (ref 35–45)
PH SMN: 7.38 [PH] (ref 7.35–7.45)
PLATELET # BLD AUTO: 407 K/UL
PLATELET BLD QL SMEAR: ABNORMAL
PMV BLD AUTO: 9.3 FL
PO2 BLDA: 47 MMHG (ref 40–60)
POC BE: -7 MMOL/L
POC SATURATED O2: 83 % (ref 95–100)
POC TCO2: 17 MMOL/L (ref 24–29)
POCT GLUCOSE: 74 MG/DL (ref 70–110)
POCT GLUCOSE: 76 MG/DL (ref 70–110)
POCT GLUCOSE: 78 MG/DL (ref 70–110)
POCT GLUCOSE: 85 MG/DL (ref 70–110)
POTASSIUM SERPL-SCNC: 5.9 MMOL/L
POTASSIUM SERPL-SCNC: 6.1 MMOL/L
PROT SERPL-MCNC: 8.1 G/DL
PROTHROMBIN TIME: 11.7 SEC
RBC # BLD AUTO: 3.53 M/UL
SAMPLE: ABNORMAL
SITE: ABNORMAL
SODIUM SERPL-SCNC: 132 MMOL/L
SODIUM SERPL-SCNC: 134 MMOL/L
SP02: 97
TOXIC GRANULES BLD QL SMEAR: PRESENT
WBC # BLD AUTO: 10.13 K/UL

## 2018-11-08 PROCEDURE — 3078F DIAST BP <80 MM HG: CPT | Mod: CPTII,S$GLB,, | Performed by: SURGERY

## 2018-11-08 PROCEDURE — 36415 COLL VENOUS BLD VENIPUNCTURE: CPT

## 2018-11-08 PROCEDURE — 82803 BLOOD GASES ANY COMBINATION: CPT

## 2018-11-08 PROCEDURE — 25000003 PHARM REV CODE 250: Performed by: EMERGENCY MEDICINE

## 2018-11-08 PROCEDURE — 83605 ASSAY OF LACTIC ACID: CPT

## 2018-11-08 PROCEDURE — 21400001 HC TELEMETRY ROOM

## 2018-11-08 PROCEDURE — 80053 COMPREHEN METABOLIC PANEL: CPT

## 2018-11-08 PROCEDURE — 93005 ELECTROCARDIOGRAM TRACING: CPT

## 2018-11-08 PROCEDURE — 85730 THROMBOPLASTIN TIME PARTIAL: CPT

## 2018-11-08 PROCEDURE — 94761 N-INVAS EAR/PLS OXIMETRY MLT: CPT

## 2018-11-08 PROCEDURE — 63600175 PHARM REV CODE 636 W HCPCS: Performed by: EMERGENCY MEDICINE

## 2018-11-08 PROCEDURE — 99900035 HC TECH TIME PER 15 MIN (STAT)

## 2018-11-08 PROCEDURE — 85025 COMPLETE CBC W/AUTO DIFF WBC: CPT

## 2018-11-08 PROCEDURE — 25000003 PHARM REV CODE 250: Performed by: HOSPITALIST

## 2018-11-08 PROCEDURE — 99214 OFFICE O/P EST MOD 30 MIN: CPT | Mod: S$GLB,,, | Performed by: SURGERY

## 2018-11-08 PROCEDURE — 99999 PR PBB SHADOW E&M-EST. PATIENT-LVL III: CPT | Mod: PBBFAC,,, | Performed by: SURGERY

## 2018-11-08 PROCEDURE — 96374 THER/PROPH/DIAG INJ IV PUSH: CPT | Mod: 59

## 2018-11-08 PROCEDURE — 99285 EMERGENCY DEPT VISIT HI MDM: CPT | Mod: 25

## 2018-11-08 PROCEDURE — 85610 PROTHROMBIN TIME: CPT

## 2018-11-08 PROCEDURE — 87040 BLOOD CULTURE FOR BACTERIA: CPT | Mod: 59

## 2018-11-08 PROCEDURE — 99221 1ST HOSP IP/OBS SF/LOW 40: CPT | Mod: ,,, | Performed by: SURGERY

## 2018-11-08 PROCEDURE — 3074F SYST BP LT 130 MM HG: CPT | Mod: CPTII,S$GLB,, | Performed by: SURGERY

## 2018-11-08 PROCEDURE — 80048 BASIC METABOLIC PNL TOTAL CA: CPT

## 2018-11-08 PROCEDURE — 3008F BODY MASS INDEX DOCD: CPT | Mod: CPTII,S$GLB,, | Performed by: SURGERY

## 2018-11-08 PROCEDURE — 63700000 PHARM REV CODE 250 ALT 637 W/O HCPCS: Performed by: HOSPITALIST

## 2018-11-08 PROCEDURE — 93010 ELECTROCARDIOGRAM REPORT: CPT | Mod: ,,, | Performed by: INTERNAL MEDICINE

## 2018-11-08 PROCEDURE — 96375 TX/PRO/DX INJ NEW DRUG ADDON: CPT

## 2018-11-08 PROCEDURE — 82962 GLUCOSE BLOOD TEST: CPT | Mod: 59

## 2018-11-08 RX ORDER — NAPROXEN SODIUM 220 MG/1
81 TABLET, FILM COATED ORAL DAILY
Status: DISCONTINUED | OUTPATIENT
Start: 2018-11-08 | End: 2018-11-21 | Stop reason: HOSPADM

## 2018-11-08 RX ORDER — SODIUM CHLORIDE 9 MG/ML
INJECTION, SOLUTION INTRAVENOUS CONTINUOUS
Status: DISCONTINUED | OUTPATIENT
Start: 2018-11-08 | End: 2018-11-18

## 2018-11-08 RX ORDER — ALLOPURINOL 100 MG/1
200 TABLET ORAL DAILY
Status: DISCONTINUED | OUTPATIENT
Start: 2018-11-09 | End: 2018-11-11

## 2018-11-08 RX ORDER — BRIMONIDINE TARTRATE 1 MG/ML
1 SOLUTION/ DROPS OPHTHALMIC 3 TIMES DAILY
Status: DISCONTINUED | OUTPATIENT
Start: 2018-11-08 | End: 2018-11-21 | Stop reason: HOSPADM

## 2018-11-08 RX ORDER — DORZOLAMIDE HYDROCHLORIDE AND TIMOLOL MALEATE 20; 5 MG/ML; MG/ML
1 SOLUTION/ DROPS OPHTHALMIC 2 TIMES DAILY
Status: DISCONTINUED | OUTPATIENT
Start: 2018-11-08 | End: 2018-11-21 | Stop reason: HOSPADM

## 2018-11-08 RX ORDER — MORPHINE SULFATE 4 MG/ML
5 INJECTION, SOLUTION INTRAMUSCULAR; INTRAVENOUS
Status: COMPLETED | OUTPATIENT
Start: 2018-11-08 | End: 2018-11-08

## 2018-11-08 RX ORDER — DEXTROSE 50 % IN WATER (D50W) INTRAVENOUS SYRINGE
25
Status: COMPLETED | OUTPATIENT
Start: 2018-11-08 | End: 2018-11-08

## 2018-11-08 RX ORDER — INSULIN ASPART 100 [IU]/ML
0-5 INJECTION, SOLUTION INTRAVENOUS; SUBCUTANEOUS
Status: DISCONTINUED | OUTPATIENT
Start: 2018-11-08 | End: 2018-11-21 | Stop reason: HOSPADM

## 2018-11-08 RX ORDER — OXYCODONE AND ACETAMINOPHEN 7.5; 325 MG/1; MG/1
1 TABLET ORAL EVERY 4 HOURS PRN
Status: DISCONTINUED | OUTPATIENT
Start: 2018-11-08 | End: 2018-11-21 | Stop reason: HOSPADM

## 2018-11-08 RX ORDER — CARVEDILOL 6.25 MG/1
6.25 TABLET ORAL 2 TIMES DAILY
Status: DISCONTINUED | OUTPATIENT
Start: 2018-11-08 | End: 2018-11-21 | Stop reason: HOSPADM

## 2018-11-08 RX ORDER — IBUPROFEN 200 MG
16 TABLET ORAL
Status: DISCONTINUED | OUTPATIENT
Start: 2018-11-08 | End: 2018-11-21 | Stop reason: HOSPADM

## 2018-11-08 RX ORDER — ACETAMINOPHEN 325 MG/1
650 TABLET ORAL EVERY 6 HOURS PRN
Status: DISCONTINUED | OUTPATIENT
Start: 2018-11-08 | End: 2018-11-21 | Stop reason: HOSPADM

## 2018-11-08 RX ORDER — PREDNISOLONE ACETATE 10 MG/ML
1 SUSPENSION/ DROPS OPHTHALMIC 3 TIMES DAILY
Status: DISCONTINUED | OUTPATIENT
Start: 2018-11-08 | End: 2018-11-21 | Stop reason: HOSPADM

## 2018-11-08 RX ORDER — FUROSEMIDE 20 MG/1
20 TABLET ORAL DAILY
Status: DISCONTINUED | OUTPATIENT
Start: 2018-11-08 | End: 2018-11-18

## 2018-11-08 RX ORDER — KETOROLAC TROMETHAMINE 5 MG/ML
1 SOLUTION OPHTHALMIC 3 TIMES DAILY
Status: DISCONTINUED | OUTPATIENT
Start: 2018-11-08 | End: 2018-11-11

## 2018-11-08 RX ORDER — AMOXICILLIN 250 MG
1 CAPSULE ORAL 2 TIMES DAILY
Status: DISCONTINUED | OUTPATIENT
Start: 2018-11-08 | End: 2018-11-21 | Stop reason: HOSPADM

## 2018-11-08 RX ORDER — ENOXAPARIN SODIUM 100 MG/ML
40 INJECTION SUBCUTANEOUS EVERY 24 HOURS
Status: DISCONTINUED | OUTPATIENT
Start: 2018-11-08 | End: 2018-11-21 | Stop reason: HOSPADM

## 2018-11-08 RX ORDER — EZETIMIBE 10 MG/1
10 TABLET ORAL DAILY
Status: DISCONTINUED | OUTPATIENT
Start: 2018-11-08 | End: 2018-11-21 | Stop reason: HOSPADM

## 2018-11-08 RX ORDER — SODIUM BICARBONATE 1 MEQ/ML
50 SYRINGE (ML) INTRAVENOUS
Status: COMPLETED | OUTPATIENT
Start: 2018-11-08 | End: 2018-11-08

## 2018-11-08 RX ORDER — GLUCAGON 1 MG
1 KIT INJECTION
Status: DISCONTINUED | OUTPATIENT
Start: 2018-11-08 | End: 2018-11-21 | Stop reason: HOSPADM

## 2018-11-08 RX ORDER — AMLODIPINE BESYLATE 5 MG/1
5 TABLET ORAL DAILY
Status: DISCONTINUED | OUTPATIENT
Start: 2018-11-08 | End: 2018-11-17

## 2018-11-08 RX ORDER — IBUPROFEN 200 MG
24 TABLET ORAL
Status: DISCONTINUED | OUTPATIENT
Start: 2018-11-08 | End: 2018-11-21 | Stop reason: HOSPADM

## 2018-11-08 RX ADMIN — DEXTROSE MONOHYDRATE 25 G: 25 INJECTION, SOLUTION INTRAVENOUS at 12:11

## 2018-11-08 RX ADMIN — INSULIN HUMAN 8 UNITS: 100 INJECTION, SOLUTION PARENTERAL at 12:11

## 2018-11-08 RX ADMIN — BRIMONIDINE TARTRATE 1 DROP: 1 SOLUTION/ DROPS OPHTHALMIC at 08:11

## 2018-11-08 RX ADMIN — SODIUM POLYSTYRENE SULFONATE 15 G: 15 SUSPENSION ORAL; RECTAL at 12:11

## 2018-11-08 RX ADMIN — DORZOLAMIDE HYDROCHLORIDE AND TIMOLOL MALEATE 1 DROP: 20; 5 SOLUTION/ DROPS OPHTHALMIC at 08:11

## 2018-11-08 RX ADMIN — PREDNISOLONE ACETATE 1 DROP: 10 SUSPENSION OPHTHALMIC at 08:11

## 2018-11-08 RX ADMIN — ENOXAPARIN SODIUM 40 MG: 100 INJECTION SUBCUTANEOUS at 06:11

## 2018-11-08 RX ADMIN — CARVEDILOL 6.25 MG: 6.25 TABLET, FILM COATED ORAL at 08:11

## 2018-11-08 RX ADMIN — SODIUM BICARBONATE 50 MEQ: 84 INJECTION INTRAVENOUS at 12:11

## 2018-11-08 RX ADMIN — CEFTRIAXONE 1 G: 1 INJECTION, SOLUTION INTRAVENOUS at 01:11

## 2018-11-08 RX ADMIN — KETOROLAC TROMETHAMINE 1 DROP: 5 SOLUTION/ DROPS OPHTHALMIC at 08:11

## 2018-11-08 RX ADMIN — OXYCODONE HYDROCHLORIDE AND ACETAMINOPHEN 1 TABLET: 7.5; 325 TABLET ORAL at 07:11

## 2018-11-08 RX ADMIN — SODIUM CHLORIDE: 0.9 INJECTION, SOLUTION INTRAVENOUS at 06:11

## 2018-11-08 RX ADMIN — STANDARDIZED SENNA CONCENTRATE AND DOCUSATE SODIUM 1 TABLET: 8.6; 5 TABLET, FILM COATED ORAL at 08:11

## 2018-11-08 RX ADMIN — MORPHINE SULFATE 5 MG: 4 INJECTION, SOLUTION INTRAMUSCULAR; INTRAVENOUS at 01:11

## 2018-11-08 NOTE — ED TRIAGE NOTES
Pt presents to ER with wound to left foot.  Pt was sent by Dr. Arnold due poor blood flow to left foot.  Pt states the MD will be placing a stent.

## 2018-11-08 NOTE — HPI
Mitch Chan MD [facility]                         11/08/2018    HPI:  Marvin Ray is a 58 y.o. 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, with long-term current use of insulin    Neovascular glaucoma of left eye, severe stage    Statin myopathy    Neovascular glaucoma, right eye, mild stage    Left leg cellulitis    PVD (peripheral vascular disease)    Right wrist pain    Multiple open wounds of lower leg    Hepatosplenomegaly    Stage 3 chronic kidney disease    Chronic combined systolic and diastolic heart failure    Non-pressure chronic ulcer of other part of left foot with fat layer exposed    Type 2 diabetes mellitus with left diabetic foot ulcer    Cellulitis    being managed by PCP and specialists who was seen today for evaluation of L foot wound.  Seen in hospital last mo with LLE cellulitis.  Treated with Abx.  Also had paracentesis for ascites with negative w/u per family.  Patient states location is L foot occurring for 1-2 mo, worsening foot wound although improvement in edema and erythema.  Associated signs and symptoms include pain and edema.  Quality is aching and severity is 5/10.  Symptoms began 10/2018 spontaneously with blistering and severe edema.  Alleviating factors include wound care and elevation.  Worsening factors include pressure.  It was recommended that pt be admitted to the hospital for his worsening wound with plans for wound care, possible Abx and  angiography.     Tobacco use: denies    Past Medical History:   Diagnosis Date    Arthritis     Cellulitis     CKD (chronic kidney disease), stage III     Coronary artery disease     Diabetes mellitus     Diabetic retinopathy     Diabetic ulcer of left foot     Glaucoma     Gout     Hyperlipemia     Hypertension     ICD (implantable cardioverter-defibrillator) in place 11/02/2018    Left chest    Non-pressure chronic ulcer of other part of left foot with fat layer exposed 10/23/2018    PVD (peripheral vascular disease)     Type 2 diabetes mellitus with left diabetic foot ulcer 10/29/2018    Unsteady gait     uses a wheelchair     Past Surgical History:   Procedure Laterality Date    AHMED GLAUCOMA IMPLANT Left 2011    DONE AT [facility]    BAERVELDT GLAUCOMA IMPLANT Left 2012    WITH CATARACT EXTRACTION//DONE AT [facility]    CARDIAC CATHETERIZATION Left 05/2016    CARDIAC DEFIBRILLATOR PLACEMENT Left 11/02/2018    CATARACT EXTRACTION W/  INTRAOCULAR LENS IMPLANT Left 2012    WITH BAERVEDT//DONE AT [facility]    CATARACT EXTRACTION W/  INTRAOCULAR LENS IMPLANT Right 09/26/2018    COMPLEX ()    CB DESTRUCTION WITH CYCLO G6 Left 02/15/2017        CYST REMOVAL      HEART CATH-LEFT N/A 5/6/2016    Performed by Mike Magana MD at [facility] CATH LAB    INSERTION, ICD GENERATOR, SINGLE CHAMBER N/A 11/2/2018    Performed by Pernell Greer MD at [facility] CATH LAB    INSERTION, IOL PROSTHESIS Right 9/26/2018    Performed by Perla Cortés MD at [facility] OR [address] [place] [address]    INTRAOCULAR PROSTHESES INSERTION Right 9/26/2018    Procedure: INSERTION, IOL PROSTHESIS;  Surgeon: Perla Cortés MD;  Location: [facility] OR [address] [place] [address];  Service: Ophthalmology;  Laterality: Right;    PHACOEMULSIFICATION OF CATARACT Right 9/26/2018    Procedure: PHACOEMULSIFICATION, CATARACT;  Surgeon: Perla Cortés MD;  Location: [facility] OR [address] [place] [address];  Service: Ophthalmology;  Laterality: Right;     PHACOEMULSIFICATION, CATARACT Right 2018    Performed by Perla Cortés MD at [facility] OR 1ST FLR    Right foot surgery  10/2014    TOE AMPUTATION Right     first and second    TONSILLECTOMY      TRABECULECTOMY/G 6 LASER Left 2/15/2017    Performed by Perla Cortés MD at [facility] OR [facility]ST [facility]     Family History   Problem Relation Age of Onset    Diabetes Mother     Heart disease Brother     No Known Problems Father     No Known Problems Sister     No Known Problems Maternal Aunt     No Known Problems Maternal Uncle     No Known Problems Paternal Aunt     No Known Problems Paternal Uncle     No Known Problems Maternal Grandmother     No Known Problems Maternal Grandfather     No Known Problems Paternal Grandmother     No Known Problems Paternal Grandfather     Anemia Neg Hx     Arrhythmia Neg Hx     Asthma Neg Hx     Clotting disorder Neg Hx     Fainting Neg Hx     Heart attack Neg Hx     Heart failure Neg Hx     Hyperlipidemia Neg Hx     Hypertension Neg Hx     Stroke Neg Hx     Atrial Septal Defect Neg Hx     Amblyopia Neg Hx     Blindness Neg Hx     Glaucoma Neg Hx     Macular degeneration Neg Hx     Retinal detachment Neg Hx     Strabismus Neg Hx      Social History     Socioeconomic History    Marital status: Legally      Spouse name: Not on file    Number of children: Not on file    Years of education: 14    Highest education level: Not on file   Social Needs    Financial resource strain: Not on file    Food insecurity - worry: Not on file    Food insecurity - inability: Not on file    Transportation needs - medical: Not on file    Transportation needs - non-medical: Not on file   Occupational History    Not on file   Tobacco Use    Smoking status: Former Smoker     Packs/day: 1.00     Years: 3.00     Pack years: 3.00     Last attempt to quit: 1984     Years since quittin.3    Smokeless tobacco: Never Used   Substance and Sexual Activity     Alcohol use: Yes     Alcohol/week: 0.6 oz     Types: 1 Cans of beer per week     Comment: Occasional    Drug use: No    Sexual activity: Not Currently   Other Topics Concern    Not on file   Social History Narrative    Not on file       Current Facility-Administered Medications:     acetaminophen tablet 650 mg, 650 mg, Oral, Q6H PRN, Rip Greenfield Jr., MD    amLODIPine tablet 5 mg, 5 mg, Oral, Daily, Rip Greenfield Jr., MD    aspirin chewable tablet 81 mg, 81 mg, Oral, Daily, Rip Greenfield Jr., MD    carvedilol tablet 6.25 mg, 6.25 mg, Oral, BID, Rip Greenfield Jr., MD    cefTRIAXone (ROCEPHIN) 1 g in dextrose 5 % 50 mL IVPB, 1 g, Intravenous, Q12H, Rip Greenfield Jr., MD    dextrose 50% injection 12.5 g, 12.5 g, Intravenous, PRN, Rip Greenfield Jr., MD    dextrose 50% injection 25 g, 25 g, Intravenous, PRN, Rip Greenfield Jr., MD    enoxaparin injection 30 mg, 30 mg, Subcutaneous, Daily, Rip Greenfield Jr., MD    ezetimibe tablet 10 mg, 10 mg, Oral, Daily, Rip Greenfield Jr., MD    furosemide tablet 20 mg, 20 mg, Oral, Daily, Rip Greenfield Jr., MD    glucagon (human recombinant) injection 1 mg, 1 mg, Intramuscular, PRN, Rip Greenfield Jr., MD    glucose chewable tablet 16 g, 16 g, Oral, PRN, Rip Greenfield Jr., MD    glucose chewable tablet 24 g, 24 g, Oral, PRN, Rpi Greenfield Jr., MD    influenza (FLUZONE QUADRIVALENT) vaccine 0.5 mL, 0.5 mL, Intramuscular, vaccine x 1 dose, Rip Greenfield Jr., MD    insulin aspart U-100 pen 0-5 Units, 0-5 Units, Subcutaneous, QID (AC + HS) PRN, Rip Greenfield Jr., MD    senna-docusate 8.6-50 mg per tablet 1 tablet, 1 tablet, Oral, BID, Rip Greenfield Jr., MD

## 2018-11-08 NOTE — ASSESSMENT & PLAN NOTE
-L foot wound multifactorial likely due to DM, PVD and venous insufficiency - rec angiography with possible intervention   -Rec wound care consult  -Offloading  -Abx per primary team  -Strict glycemic control  -NPO at midnight

## 2018-11-08 NOTE — ED PROVIDER NOTES
Encounter Date: 11/8/2018  SORT MSE:  Pt is a 58 y.o. male who presents for emergent consideration for problems with veins. Pt will be moved to room when one is available, otherwise will wait in waiting room with triage nurse supervision.  Pt arrived by wheelchair. He is not in distress. Orders have been placed. TONI Spears DNP ACNP-BC 11/8/2018 9:52 AM     SCRIBE #1 NOTE: I, Mirian Cardona, am scribing for, and in the presence of,  Rip Greenfield MD. I have scribed the following portions of the note - Other sections scribed: ROS and HPI.       History     Chief Complaint   Patient presents with    Leg Swelling     Left , stated sent by dr Peraza for evaluation of circulation problems , poor flow.      CC Wound Problem    HPI: This 58 y.o. male with  a past medical history of Arthritis, Cellulitis, Coronary artery disease, Diabetes mellitus, Diabetic retinopathy, Glaucoma, Gout, Hyperlipemia, Hypertension, Non-pressure chronic ulcer of other part of left foot with fat layer exposed (10/23/2018), and Type 2 diabetes mellitus with left diabetic foot ulcer (10/29/2018), who is sent to this ED from Dr. Chan office for further management of his non healing LLE wound with a consideration of hospital admission. He reports associated pain to his LLE but reports wound is not worsening. Denies fever, chills, N/V or any other new sx. He is currently taking his prescribed antibiotics.      The history is provided by the patient. No  was used.     Review of patient's allergies indicates:   Allergen Reactions    Statins-hmg-coa reductase inhibitors      Generalized Pain    Onglyza [saxagliptin]     Penicillins Rash     Past Medical History:   Diagnosis Date    Arthritis     Cellulitis     CKD (chronic kidney disease), stage III     Coronary artery disease     Diabetes mellitus     Diabetic retinopathy     Diabetic ulcer of left foot     Glaucoma     Gout     Hyperlipemia     Hypertension      ICD (implantable cardioverter-defibrillator) in place 11/02/2018    Left chest    Non-pressure chronic ulcer of other part of left foot with fat layer exposed 10/23/2018    PVD (peripheral vascular disease)     Type 2 diabetes mellitus with left diabetic foot ulcer 10/29/2018    Unsteady gait     uses a wheelchair     Past Surgical History:   Procedure Laterality Date    AHMED GLAUCOMA IMPLANT Left 2011    DONE AT [facility]    BAERVELDT GLAUCOMA IMPLANT Left 2012    WITH CATARACT EXTRACTION//DONE AT [facility]    CARDIAC CATHETERIZATION Left 05/2016    CARDIAC DEFIBRILLATOR PLACEMENT Left 11/02/2018    CATARACT EXTRACTION W/  INTRAOCULAR LENS IMPLANT Left 2012    WITH BAERVEDT//DONE AT [facility]    CATARACT EXTRACTION W/  INTRAOCULAR LENS IMPLANT Right 09/26/2018    COMPLEX ()    CB DESTRUCTION WITH CYCLO G6 Left 02/15/2017        CYST REMOVAL      HEART CATH-LEFT N/A 5/6/2016    Performed by Mike Magana MD at [facility] CATH LAB    INSERTION, ICD GENERATOR, SINGLE CHAMBER N/A 11/2/2018    Performed by [facility] at [facility] CATH LAB    INSERTION, IOL PROSTHESIS Right 9/26/2018    Performed by Perla Cortés MD at [facility] OR [address] [place] [address]    INTRAOCULAR PROSTHESES INSERTION Right 9/26/2018    Procedure: INSERTION, IOL PROSTHESIS;  Surgeon: Perla Cortés MD;  Location: [facility] OR [address] [place] [address];  Service: Ophthalmology;  Laterality: Right;    PHACOEMULSIFICATION OF CATARACT Right 9/26/2018    Procedure: PHACOEMULSIFICATION, CATARACT;  Surgeon: Perla Cortés MD;  Location: [facility] OR [address] [place] [address];  Service: Ophthalmology;  Laterality: Right;    PHACOEMULSIFICATION, CATARACT Right 9/26/2018    Performed by Perla Cortés MD at [facility] OR [address] [place] [address]    Right foot surgery  10/2014    TOE AMPUTATION Right     first and second    TONSILLECTOMY      TRABECULECTOMY/G 6 LASER Left 2/15/2017    Performed by Perla Cortés MD at [facility] OR [address] [place] [address]     Family History   Problem  Relation Age of Onset    Diabetes Mother     Heart disease Brother     No Known Problems Father     No Known Problems Sister     No Known Problems Maternal Aunt     No Known Problems Maternal Uncle     No Known Problems Paternal Aunt     No Known Problems Paternal Uncle     No Known Problems Maternal Grandmother     No Known Problems Maternal Grandfather     No Known Problems Paternal Grandmother     No Known Problems Paternal Grandfather     Anemia Neg Hx     Arrhythmia Neg Hx     Asthma Neg Hx     Clotting disorder Neg Hx     Fainting Neg Hx     Heart attack Neg Hx     Heart failure Neg Hx     Hyperlipidemia Neg Hx     Hypertension Neg Hx     Stroke Neg Hx     Atrial Septal Defect Neg Hx     Amblyopia Neg Hx     Blindness Neg Hx     Glaucoma Neg Hx     Macular degeneration Neg Hx     Retinal detachment Neg Hx     Strabismus Neg Hx      Social History     Tobacco Use    Smoking status: Former Smoker     Packs/day: 1.00     Years: 3.00     Pack years: 3.00     Last attempt to quit: 1984     Years since quittin.3    Smokeless tobacco: Never Used   Substance Use Topics    Alcohol use: Yes     Alcohol/week: 0.6 oz     Types: 1 Cans of beer per week     Comment: Occasional    Drug use: No     Review of Systems   Constitutional: Negative for chills and fever.   HENT: Negative for congestion, ear pain, rhinorrhea and sore throat.    Eyes: Negative for pain and visual disturbance.   Respiratory: Negative for cough and shortness of breath.    Cardiovascular: Negative for chest pain.   Gastrointestinal: Negative for abdominal pain, diarrhea, nausea and vomiting.   Genitourinary: Negative for dysuria.   Musculoskeletal: Negative for back pain and neck pain.        (+) Pain associated with L foot wound   Skin: Positive for wound (LLE). Negative for rash.   Neurological: Negative for headaches.       Physical Exam     Initial Vitals [18 0956]   BP Pulse Resp Temp SpO2   (!)  117/57 70 16 98.3 °F (36.8 °C) 97 %      MAP       --         Physical Exam    Nursing note and vitals reviewed.  Constitutional: He appears well-developed and well-nourished. He is not diaphoretic. No distress.   HENT:   Head: Normocephalic and atraumatic.   Right Ear: External ear normal.   Left Ear: External ear normal.   Nose: Nose normal.   Mouth/Throat: Oropharynx is clear and moist.   Eyes: Conjunctivae and EOM are normal. Pupils are equal, round, and reactive to light. Right eye exhibits no discharge. Left eye exhibits no discharge. No scleral icterus.   Neck: Normal range of motion. Neck supple. No JVD present.   Cardiovascular: Normal rate, regular rhythm, normal heart sounds and intact distal pulses. Exam reveals no gallop and no friction rub.    No murmur heard.  Pulmonary/Chest: Breath sounds normal. No stridor. No respiratory distress. He has no wheezes. He has no rhonchi. He has no rales. He exhibits no tenderness.   Abdominal: Soft. Bowel sounds are normal. He exhibits no distension and no mass. There is no tenderness. There is no rebound and no guarding.   Musculoskeletal: Normal range of motion. He exhibits edema. He exhibits no tenderness.   Right lower extremity:  Status post great toe amputation.  There is erythema to the mid shaft pretibial area consistent with venous stasis.  Left lower extremity:  There is a chronic wound to the dorsum left foot which appears to have been debrided.  There is also circumferential erythema affecting most of the leg.  No crepitus.  No bullae.   Neurological: He is alert and oriented to person, place, and time. He has normal strength. No cranial nerve deficit or sensory deficit.   Skin: Skin is warm and dry. No rash noted. There is erythema. No pallor.   Psychiatric: He has a normal mood and affect. His behavior is normal. Judgment and thought content normal.         ED Course   Critical Care  Date/Time: 11/8/2018 5:55 PM  Performed by: Rip Greenfield Jr.,  MD  Authorized by: Ludivina Rodríguez MD   Direct patient critical care time: 10 minutes  Additional history critical care time: 10 minutes  Ordering / reviewing critical care time: 5 minutes  Documentation critical care time: 5 minutes  Consulting other physicians critical care time: 5 minutes  Consult with family critical care time: 5 minutes  Total critical care time (exclusive of procedural time) : 40 minutes  Critical care was necessary to treat or prevent imminent or life-threatening deterioration of the following conditions: metabolic crisis.  Critical care was time spent personally by me on the following activities: development of treatment plan with patient or surrogate, discussions with consultants, interpretation of cardiac output measurements, evaluation of patient's response to treatment, examination of patient, obtaining history from patient or surrogate, ordering and review of laboratory studies, ordering and performing treatments and interventions, ordering and review of radiographic studies, pulse oximetry, re-evaluation of patient's condition and review of old charts.        Labs Reviewed   CBC W/ AUTO DIFFERENTIAL - Abnormal; Notable for the following components:       Result Value    RBC 3.53 (*)     Hemoglobin 11.0 (*)     Hematocrit 32.3 (*)     MCH 31.2 (*)     RDW 17.0 (*)     Platelets 407 (*)     Gran # (ANC) 8.4 (*)     Lymph # 0.5 (*)     Gran% 83.2 (*)     Lymph% 5.3 (*)     Platelet Estimate Increased (*)     All other components within normal limits   COMPREHENSIVE METABOLIC PANEL - Abnormal; Notable for the following components:    Sodium 132 (*)     Potassium 6.1 (*)     CO2 16 (*)     BUN, Bld 55 (*)     Creatinine 1.6 (*)     Albumin 2.4 (*)     Alkaline Phosphatase 337 (*)     Anion Gap 7 (*)     eGFR if  53 (*)     eGFR if non  46 (*)     All other components within normal limits    Narrative:     Recoll. [id number] by BREN at 11/08/2018 11:23,  reason: Specimen   hemolyzed; Tube has been refrigerated; called to [facility]    11/08/2018  11:23   ISTAT PROCEDURE - Abnormal; Notable for the following components:    POC PCO2 28.2 (*)     POC HCO3 16.6 (*)     POC SATURATED O2 83 (*)     POC TCO2 17 (*)     All other components within normal limits   LACTIC ACID, PLASMA   APTT    Narrative:     Recoll. [id number] by [facility] at 11/08/2018 11:31, reason: Specimen   hemolyzed; Tube has been refrigerated; called to Danita Yebo[facility]    11/08/2018  11:31   PROTIME-INR    Narrative:     Recoll. [id number] by [facility] at 11/08/2018 11:31, reason: Specimen   hemolyzed; Tube has been refrigerated; called to [facility]ita Yebo[facility]    11/08/2018  11:31     EKG Readings: (Independently Interpreted)   Initial Reading: No STEMI.   EKG reviewed and interpreted by me shows sinus rhythm at a rate of 67.  P.r., QRS, QT intervals within normal limits.  There is a right axis deviation.  There is poor R-wave progression.  There is low-voltage QRS.  There are no ST segment or T-wave ischemic findings.         Imaging Results    None          Medical Decision Making:   ED Management:  This is the emergent evaluation of a 58-year-old male presents emergency department nonhealing wounds to the left lower extremity.  Patient was sent in his vascular surgeon for further workup and possible admission.  Differential diagnosis at the time of initial evaluation included, but was not limited to:  Venous stasis, lymphedema, cellulitis, diabetic ulcers, arterial insufficiency/peripheral vascular disease.  I do not suspect necrotizing infection.  I do not suspect DVT as the patient has had negative DVT studies done already.       I spoke with the patient's vascular surgeon, Dr. Chan.  He is concerned that the patient is not appropriately responding to outpatient antibiotics.  He believes the patient likely needs angiogram and possible percutaneous intervention for peripheral vascular disease.  Additionally, the  patient has a metabolic acidosis in the setting of acute kidney injury and hyperkalemia.  He does not have any concerning EKG changes.  This may be medication related as the patient is on Bactrim.  The patient does have a history of congestive heart failure with reduced ejection fraction so I did not give the patient any IV fluids.  However, he did receive insulin, dextrose, sodium bicarbonate.  No QRS widening on EKG so I did not give the patient calcium.  I did given Kayexalate.  I discussed this case with the admitting physician who agrees with the above plan for admission, correction of metabolic abnormalities, and possible clearance for vascular procedure.  Patient is also in agreement with this plan and is stable at the time of admission.            Scribe Attestation:   Scribe #1: I performed the above scribed service and the documentation accurately describes the services I performed. I attest to the accuracy of the note.    Attending Attestation:           Physician Attestation for Scribe:  Physician Attestation Statement for Scribe #1: I, Rip Greenfield MD, reviewed documentation, as scribed by Mirian Cardona in my presence, and it is both accurate and complete.                 ED Course as of Nov 08 1755   u Nov 08, 2018   1021 Paged Dr. Chan    [VC]   1022 Extensive foot wound, no signs of infection. Has progressed. Venous insufficiency and pvd. DM. Had ascites tapped last time he was in the hospital. Wants admit to hospitalist and vascular will consult and do angio tomorrow. Wants labs, wound care caonsult.  [VC]      ED Course User Index  [VC] Britton Spears DNP     Clinical Impression:   The primary encounter diagnosis was Metabolic acidosis. Diagnoses of Hyperkalemia, Acute kidney injury, Cellulitis of left lower extremity, Peripheral vascular disease, and Cellulitis were also pertinent to this visit.                             Rip Greenfield Jr., MD  11/08/18 0984

## 2018-11-08 NOTE — SUBJECTIVE & OBJECTIVE
"Medications Prior to Admission   Medication Sig Dispense Refill Last Dose    allopurinol (ZYLOPRIM) 100 MG tablet Take 2 tablets (200 mg total) by mouth once daily. 180 tablet 3 11/8/2018    amLODIPine (NORVASC) 5 MG tablet TAKE 1 TABLET(5 MG) BY MOUTH EVERY DAY 30 tablet 0 11/8/2018    aspirin 81 MG Chew Take 81 mg by mouth once daily.   Unknown at Unknown time    benazepril (LOTENSIN) 40 MG tablet TAKE 1 TABLET(40 MG) BY MOUTH TWICE DAILY 60 tablet 0 Unknown at Unknown time    brimonidine 0.1% (ALPHAGAN P) 0.1 % Drop Place 1 drop into both eyes 3 (three) times daily. 15 mL 12 Unknown at Unknown time    carvedilol (COREG) 6.25 MG tablet Take 1 tablet (6.25 mg total) by mouth 2 (two) times daily. 60 tablet 11 Unknown at Unknown time    coenzyme Q10 100 mg capsule Take 100 mg by mouth every morning.   Unknown at Unknown time    dorzolamide-timolol 2-0.5% (COSOPT) 22.3-6.8 mg/mL ophthalmic solution Place 1 drop into both eyes 3 (three) times daily. 10 mL 12 Unknown at Unknown time    ezetimibe (ZETIA) 10 mg tablet Take 1 tablet (10 mg total) by mouth once daily. 30 tablet 2 Unknown at Unknown time    fish oil-omega-3 fatty acids 300-1,000 mg capsule Take 2 capsules (2 g total) by mouth 2 (two) times daily. (Patient taking differently: Take 1 g by mouth 2 (two) times daily. ) 120 capsule 5 Unknown at Unknown time    furosemide (LASIX) 20 MG tablet Take 1 tablet (20 mg total) by mouth once daily. 30 tablet 3 Unknown at Unknown time    insulin glargine-lixisenatide (SOLIQUA 100/33) 100 unit-33 mcg/mL InPn Inject 34 units once daily 5 Syringe 2 Unknown at Unknown time    ketorolac 0.5% (ACULAR) 0.5 % Drop Place 1 drop into the right eye 3 (three) times daily. 1 Bottle 2 Unknown at Unknown time    MULTIVIT,THER IRON,CA,FA & MIN (MULTIVITAMIN) Tab Take 1 tablet by mouth every morning.    Unknown at Unknown time    pen needle, diabetic 31 gauge x 1/4" Ndle Use as directed 100 each 11 Unknown at Unknown time "    prednisoLONE acetate (PRED FORTE) 1 % DrpS Place 1 drop into the right eye 3 (three) times daily. 1 Bottle 2 Unknown at Unknown time    spironolactone (ALDACTONE) 100 MG tablet Take 1 tablet (100 mg total) by mouth once daily. 30 tablet 11 Unknown at Unknown time    sulfamethoxazole-trimethoprim 800-160mg (BACTRIM DS) 800-160 mg Tab Take 1 tablet by mouth 2 (two) times daily. 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    Left chest    Non-pressure chronic ulcer of other part of left foot with fat layer exposed 10/23/2018    PVD (peripheral vascular disease)     Type 2 diabetes mellitus with left diabetic foot ulcer 10/29/2018    Unsteady gait     uses a wheelchair     Past Surgical History:   Procedure Laterality Date    AHMED GLAUCOMA IMPLANT Left 2011    DONE AT [facility]    BAERVELDT GLAUCOMA IMPLANT Left 2012    WITH CATARACT EXTRACTION//DONE AT [facility]    CARDIAC CATHETERIZATION Left 05/2016    CARDIAC DEFIBRILLATOR PLACEMENT Left 11/02/2018    CATARACT EXTRACTION W/  INTRAOCULAR LENS IMPLANT Left 2012    WITH BAERVEDT//DONE AT [facility]    CATARACT EXTRACTION W/  INTRAOCULAR LENS IMPLANT Right 09/26/2018    COMPLEX ()    CB DESTRUCTION WITH CYCLO G6 Left 02/15/2017        CYST REMOVAL      HEART CATH-LEFT N/A 5/6/2016    Performed by [facility] at [facility] CATH LAB    INSERTION, ICD GENERATOR, SINGLE CHAMBER N/A 11/2/2018     Per[facility] by [facility] at [facility] CATH LAB    INSERTION, IOL PROSTHESIS Right 2018    Performed by Perla Cortés MD at [facility] OR [address] [place] [address]    INTRAOCULAR PROSTHESES INSERTION Right 2018    Procedure: INSERTION, IOL PROSTHESIS;  Surgeon: Perla Cortés MD;  Location: [facility] OR [address] [place] [address];  Service: Ophthalmology;  Laterality: Right;    PHACOEMULSIFICATION OF CATARACT Right 2018    Procedure: PHACOEMULSIFICATION, CATARACT;  Surgeon: Perla Cortés MD;  Location: [facility] OR [address] [place] [address];  Service: Ophthalmology;  Laterality: Right;    PHACOEMULSIFICATION, CATARACT Right 2018    Performed by Perla Cortés MD at [facility] OR [address] [place] [address]    Right foot surgery  10/2014    TOE AMPUTATION Right     first and second    TONSILLECTOMY      TRABECULECTOMY/G 6 LASER Left 2/15/2017    Performed by Perla Cortés MD at [facility] OR [address] [place] [address]     Family History     Problem Relation (Age of Onset)    Diabetes Mother    Heart disease Brother    No Known Problems Father, Sister, Maternal Aunt, Maternal Uncle, Paternal Aunt, Paternal Uncle, Maternal Grandmother, Maternal Grandfather, Paternal Grandmother, Paternal Grandfather        Tobacco Use    Smoking status: Former Smoker     Packs/day: 1.00     Years: 3.00     Pack years: 3.00     Last attempt to quit: 1984     Years since quittin.3    Smokeless tobacco: Never Used   Substance and Sexual Activity    Alcohol use: Yes     Alcohol/week: 0.6 oz     Types: 1 Cans of beer per week     Comment: Occasional    Drug use: No    Sexual activity: Not Currently     Review of Systems   Constitutional: Negative for chills and fever.   HENT: Negative for congestion.    Eyes: Negative for visual disturbance.   Respiratory: Negative for shortness of breath.    Cardiovascular: Negative for chest pain.   Gastrointestinal: Negative for abdominal pain.   Endocrine: Negative for polyphagia.   Genitourinary: Negative for dysuria.   Musculoskeletal:  Negative for back pain.   Skin: Positive for color change and wound. Negative for pallor and rash.   Allergic/Immunologic: Negative for immunocompromised state.   Neurological: Negative for weakness.   Hematological: Does not bruise/bleed easily.   Psychiatric/Behavioral: Negative for confusion.     Objective:     Vital Signs (Most Recent):  Temp: 97.9 °F (36.6 °C) (11/08/18 1515)  Pulse: 75 (11/08/18 1515)  Resp: 18 (11/08/18 1515)  BP: 128/60 (11/08/18 1515)  SpO2: 98 % (11/08/18 1515) Vital Signs (24h Range):  Temp:  [97.9 °F (36.6 °C)-98.3 °F (36.8 °C)] 97.9 °F (36.6 °C)  Pulse:  [67-78] 75  Resp:  [16-21] 18  SpO2:  [96 %-98 %] 98 %  BP: (116-129)/(57-67) 128/60     Weight: 98.9 kg (218 lb)  Body mass index is 31.28 kg/m².    Physical Exam   Constitutional: He is oriented to person, place, and time. He appears well-developed and well-nourished. No distress.   HENT:   Head: Normocephalic and atraumatic.   Eyes: Conjunctivae are normal.   Neck: Neck supple.   Cardiovascular: Normal rate.   Pulses:       Femoral pulses are 2+ on the right side, and 2+ on the left side.       Dorsalis pedis pulses are Detected w/ doppler on the right side, and Detected w/ doppler on the left side.        Posterior tibial pulses are Detected w/ doppler on the right side, and Detected w/ doppler on the left side.   Pulmonary/Chest: Effort normal. No respiratory distress. He exhibits no tenderness.   Abdominal: Soft. He exhibits no distension and no mass. There is no tenderness. There is no rebound and no guarding.   Musculoskeletal: Normal range of motion. He exhibits edema. He exhibits no tenderness or deformity.   Feet:   Right Foot:   Skin Integrity: Negative for ulcer, blister, skin breakdown, callus or dry skin.   Left Foot:   Skin Integrity: Positive for ulcer, blister, skin breakdown, callus and dry skin.   Neurological: He is alert and oriented to person, place, and time. No sensory deficit.   Skin: Skin is warm and dry.  Capillary refill takes less than 2 seconds. No rash noted. No erythema. No pallor.   Psychiatric: He has a normal mood and affect.   Vitals reviewed.      Significant Labs:  All pertinent labs from the last 24 hours have been reviewed.    Significant Diagnostics:  I have reviewed all pertinent imaging results/findings within the past 24 hours.

## 2018-11-08 NOTE — SUBJECTIVE & OBJECTIVE
Past Medical History:   Diagnosis Date    Arthritis     Cellulitis     CKD (chronic kidney disease), stage III     Coronary artery disease     Diabetes mellitus     Diabetic retinopathy     Diabetic ulcer of left foot     Glaucoma     Gout     Hyperlipemia     Hypertension     ICD (implantable cardioverter-defibrillator) in place 11/02/2018    Left chest    Non-pressure chronic ulcer of other part of left foot with fat layer exposed 10/23/2018    PVD (peripheral vascular disease)     Type 2 diabetes mellitus with left diabetic foot ulcer 10/29/2018    Unsteady gait     uses a wheelchair       Past Surgical History:   Procedure Laterality Date    AHMED GLAUCOMA IMPLANT Left 2011    DONE AT [facility]    BAERVELDT GLAUCOMA IMPLANT Left 2012    WITH CATARACT EXTRACTION//DONE AT [facility]    CARDIAC CATHETERIZATION Left 05/2016    CARDIAC DEFIBRILLATOR PLACEMENT Left 11/02/2018    CATARACT EXTRACTION W/  INTRAOCULAR LENS IMPLANT Left 2012    WITH BAERVEDT//DONE AT [facility]    CATARACT EXTRACTION W/  INTRAOCULAR LENS IMPLANT Right 09/26/2018    COMPLEX ()    CB DESTRUCTION WITH CYCLO G6 Left 02/15/2017        CYST REMOVAL      HEART CATH-LEFT N/A 5/6/2016    Performed by Mike Magana MD at [facility] CATH LAB    INSERTION, ICD GENERATOR, SINGLE CHAMBER N/A 11/2/2018    Performed by Pernell Greer [facility] at [facility] CATH LAB    INSERTION, IOL PROSTHESIS Right 9/26/2018    Performed by Perla Cortés MD at [facility] OR [address] [place] [address]    INTRAOCULAR PROSTHESES INSERTION Right 9/26/2018    Procedure: INSERTION, IOL PROSTHESIS;  Surgeon: Perla Cortés MD;  Location: [facility] OR [address] [place] [address];  Service: Ophthalmology;  Laterality: Right;    PHACOEMULSIFICATION OF CATARACT Right 9/26/2018    Procedure: PHACOEMULSIFICATION, CATARACT;  Surgeon: Perla Cortés MD;  Location: [facility] OR [address] [place] [address];  Service: Ophthalmology;  Laterality: Right;    PHACOEMULSIFICATION, CATARACT Right 9/26/2018     Performed by [facility]la [facility]és MD at [facility] OR 1ST FLR    Right foot surgery  10/2014    TOE AMPUTATION Right     first and second    TONSILLECTOMY      TRABECULECTOMY/G 6 LASER Left 2/15/2017    Performed by Perla Cortés MD at [facility] OR [facility] F[facility]       Review of patient's allergies indicates:   Allergen Reactions    Statins-hmg-coa reductase inhibitors      Generalized Pain    Onglyza [saxagliptin]     Penicillins Rash       No current facility-administered medications on file prior to encounter.      Current Outpatient Medications on File Prior to Encounter   Medication Sig    allopurinol (ZYLOPRIM) 100 MG tablet Take 2 tablets (200 mg total) by mouth once daily.    amLODIPine (NORVASC) 5 MG tablet TAKE 1 TABLET(5 MG) BY MOUTH EVERY DAY    aspirin 81 MG Chew Take 81 mg by mouth once daily.    benazepril (LOTENSIN) 40 MG tablet TAKE 1 TABLET(40 MG) BY MOUTH TWICE DAILY    brimonidine 0.1% (ALPHAGAN P) 0.1 % Drop Place 1 drop into both eyes 3 (three) times daily.    carvedilol (COREG) 6.25 MG tablet Take 1 tablet (6.25 mg total) by mouth 2 (two) times daily.    coenzyme Q10 100 mg capsule Take 100 mg by mouth every morning.    dorzolamide-timolol 2-0.5% (COSOPT) 22.3-6.8 mg/mL ophthalmic solution Place 1 drop into both eyes 3 (three) times daily.    ezetimibe (ZETIA) 10 mg tablet Take 1 tablet (10 mg total) by mouth once daily.    fish oil-omega-3 fatty acids 300-1,000 mg capsule Take 2 capsules (2 g total) by mouth 2 (two) times daily. (Patient taking differently: Take 1 g by mouth 2 (two) times daily. )    furosemide (LASIX) 20 MG tablet Take 1 tablet (20 mg total) by mouth once daily.    insulin glargine-lixisenatide (SOLIQUA 100/33) 100 unit-33 mcg/mL InPn Inject 34 units once daily    ketorolac 0.5% (ACULAR) 0.5 % Drop Place 1 drop into the right eye 3 (three) times daily.    MULTIVIT,THER IRON,CA,FA & MIN (MULTIVITAMIN) Tab Take 1 tablet by mouth every morning.     pen needle,  "diabetic 31 gauge x 1/4" Ndle Use as directed    prednisoLONE acetate (PRED FORTE) 1 % DrpS Place 1 drop into the right eye 3 (three) times daily.    spironolactone (ALDACTONE) 100 MG tablet Take 1 tablet (100 mg total) by mouth once daily.    sulfamethoxazole-trimethoprim 800-160mg (BACTRIM DS) 800-160 mg Tab Take 1 tablet by mouth 2 (two) times daily. for 10 days     Family History     Problem Relation (Age of Onset)    Diabetes Mother    Heart disease Brother    No Known Problems Father, Sister, Maternal Aunt, Maternal Uncle, Paternal Aunt, Paternal Uncle, Maternal Grandmother, Maternal Grandfather, Paternal Grandmother, Paternal Grandfather        Tobacco Use    Smoking status: Former Smoker     Packs/day: 1.00     Years: 3.00     Pack years: 3.00     Last attempt to quit: 1984     Years since quittin.3    Smokeless tobacco: Never Used   Substance and Sexual Activity    Alcohol use: Yes     Alcohol/week: 0.6 oz     Types: 1 Cans of beer per week     Comment: Occasional    Drug use: No    Sexual activity: Not Currently     Review of Systems   Constitutional: Negative for chills and fever.   HENT: Negative for congestion.    Eyes: Negative for visual disturbance.   Respiratory: Negative for shortness of breath.    Cardiovascular: Negative for chest pain.   Gastrointestinal: Negative for abdominal pain.   Endocrine: Negative for polyphagia.   Genitourinary: Negative for dysuria.   Musculoskeletal: Negative for back pain.   Skin: Positive for color change and wound. Negative for pallor and rash.   Allergic/Immunologic: Negative for immunocompromised state.   Neurological: Negative for weakness.   Hematological: Does not bruise/bleed easily.   Psychiatric/Behavioral: Negative for confusion.     Objective:     Vital Signs (Most Recent):  Temp: 97.9 °F (36.6 °C) (18)  Pulse: 75 (18)  Resp: 18 (18)  BP: 128/60 (18)  SpO2: 98 % (18) Vital Signs (24h " Range):  Temp:  [97.9 °F (36.6 °C)-98.3 °F (36.8 °C)] 97.9 °F (36.6 °C)  Pulse:  [67-78] 75  Resp:  [16-21] 18  SpO2:  [96 %-98 %] 98 %  BP: (116-129)/(57-67) 128/60     Weight: (213.2 pounds)  Body mass index is 31.28 kg/m².    Physical Exam   Constitutional: He is oriented to person, place, and time. He appears well-developed and well-nourished. No distress.   HENT:   Head: Normocephalic and atraumatic.   Eyes: Conjunctivae are normal.   Neck: Neck supple.   Cardiovascular: Normal rate.   Pulses:       Femoral pulses are 2+ on the right side, and 2+ on the left side.       Dorsalis pedis pulses are Detected w/ doppler on the right side, and Detected w/ doppler on the left side.        Posterior tibial pulses are Detected w/ doppler on the right side, and Detected w/ doppler on the left side.   Pulmonary/Chest: Effort normal. No respiratory distress. He exhibits no tenderness.   Abdominal: Soft. He exhibits no distension and no mass. There is no tenderness. There is no rebound and no guarding.   Musculoskeletal: Normal range of motion. He exhibits edema. He exhibits no tenderness or deformity.   Feet:   Right Foot:   Skin Integrity: Negative for ulcer, blister, skin breakdown, callus or dry skin.   Left Foot:   Skin Integrity: Positive for ulcer, blister, skin breakdown, callus and dry skin.   Neurological: He is alert and oriented to person, place, and time. No sensory deficit.   Skin: Skin is warm and dry. Capillary refill takes less than 2 seconds. No rash noted. No erythema. No pallor.   Psychiatric: He has a normal mood and affect.   Vitals reviewed.          Significant Labs:   A1C:   Recent Labs   Lab 07/27/18  0820 10/30/18  0955   HGBA1C >14.0* 8.3*     Blood Culture: No results for input(s): LABBLOO in the last 48 hours.  CBC:   Recent Labs   Lab 11/08/18  1041   WBC 10.13   HGB 11.0*   HCT 32.3*   *     CMP:   Recent Labs   Lab 11/08/18  1154 11/08/18  1541   * 134*   K 6.1* 5.9*     109   CO2 16* 20*   GLU 77 70   BUN 55* 53*   CREATININE 1.6* 1.5*   CALCIUM 9.3 9.1   PROT 8.1  --    ALBUMIN 2.4*  --    BILITOT 0.8  --    ALKPHOS 337*  --    AST 24  --    ALT 33  --    ANIONGAP 7* 5*   EGFRNONAA 46* 50*     Cardiac Markers: No results for input(s): CKMB, MYOGLOBIN, BNP, TROPISTAT in the last 48 hours.  Coagulation:   Recent Labs   Lab 11/08/18  1154   INR 1.1   APTT 27.5     Lactic Acid:   Recent Labs   Lab 11/08/18  1041   LACTATE 1.3     Magnesium: No results for input(s): MG in the last 48 hours.  POCT Glucose:   Recent Labs   Lab 11/08/18  1247 11/08/18  1541 11/08/18  1654   POCTGLUCOSE 78 74 76     Troponin: No results for input(s): TROPONINI in the last 48 hours.  Urine Studies: No results for input(s): COLORU, APPEARANCEUA, PHUR, SPECGRAV, PROTEINUA, GLUCUA, KETONESU, BILIRUBINUA, OCCULTUA, NITRITE, UROBILINOGEN, LEUKOCYTESUR, RBCUA, WBCUA, BACTERIA, SQUAMEPITHEL, HYALINECASTS in the last 48 hours.    Invalid input(s): NOEMI    Significant Imaging: reviewed last 24 hours

## 2018-11-08 NOTE — HPI
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 Chan office with concern for nonhealing wound to LLE. He was recently given bactrim and presented today with LAYNE, hyperkalemia and increased pain and edema to LLE. Pt has h/o Group G strep bacteremia (10/10). ICD implanted 11/2.  The patient's sister reports doing dressing changes of LE wounds 5 times a day. Noticeable weeping of fluid from the LE. Pt made NPO by vascular surgery and plan for angiogram and possible stent placement to LE to assist with better LE wound healing.

## 2018-11-08 NOTE — PATIENT INSTRUCTIONS
Diabetes and Peripheral Arterial Disease (PAD)    Diabetes is a condition in which your body has trouble using a sugar called glucose, for energy. As a result, the sugar level in your blood becomes too high. Diabetes is a chronic (lifelong) condition. It puts you at high risk for peripheral arterial disease (PAD). This is a disease of arteries in the legs. If you have PAD, arteries in other parts of your body are likely diseased, too. That puts you at high risk for other serious health problems. Read on to learn how diabetes can lead to PAD and affect your health.  How diabetes can lead to PAD  Diabetes can hurt your arteries. If diabetes is not controlled well, blood sugar levels will be high. High blood sugar can make the artery walls rough. A waxy substance in the blood called plaque can then build up on the artery walls. This plaque contains cholesterol. This makes it harder for blood to flow through your arteries. This limits blood flow to your arms and legs, which causes damage in the tissues. The feet are most at risk of tissue damage. If tissue damage is very bad, then toes, feet, or even legs may need to be removed (amputated). But blood sugar and cholesterol levels can be controlled. This is done with nutrition and exercise. Weight loss and medication may also help. And proper foot care is very important for people with PAD.  If diabetes is not controlled  Uncontrolled diabetes can cause many complications, including:  · Heart disease  · Stroke  · Kidney damage or kidney failure (nephropathy)  · Liver disease  · Digestion problems  · Nerve damage (neuropathy)  · Eye damage (retinopathy)  · Sexual dysfunction  · Periodontal (gum) disease  · Needing a toe, foot, or leg amputated (if you also have PAD)  If diabetes is controlled  Controlling diabetes can reduce your risk for serious health problems, including:  · Heart disease and stroke  · Kidney disease   · Eye disease   · Neuropathy   · With proper foot  care, the need for amputation   Date Last Reviewed: 7/1/2016  © 7444-5357 [employer]. [address] [place] [address], [place] [address]. All rights reserved. This information is not intended as a substitute for professional medical care. Always follow your healthcare professional's instructions.

## 2018-11-08 NOTE — PROGRESS NOTES
Mitch Chan MD [facility]                         11/08/2018    HPI:  Marvin Ray is a 58 y.o. 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, with long-term current use of insulin    Neovascular glaucoma of left eye, severe stage    Statin myopathy    Neovascular glaucoma, right eye, mild stage    Left leg cellulitis    PVD (peripheral vascular disease)    Right wrist pain    Multiple open wounds of lower leg    Hepatosplenomegaly    Stage 3 chronic kidney disease    Chronic combined systolic and diastolic heart failure    Non-pressure chronic ulcer of other part of left foot with fat layer exposed    Type 2 diabetes mellitus with left diabetic foot ulcer    being managed by PCP and specialists who is here today for evaluation of L foot wound.  Seen in hospital last mo with LLE cellulitis.  Treated with Abx.  Also had paracentesis for ascites with negative w/u per family.  Patient states location is L foot occurring for 1-2 mo, worsening foot wound although improvement in edema and erythema.  Associated signs and symptoms include pain and edema.  Quality is aching and severity is 5/10.  Symptoms began 10/2018 spontaneously with blistering and severe edema.  Alleviating factors include wound care and elevation.  Worsening factors include pressure.    Tobacco use: denies    Past Medical History:   Diagnosis Date    Arthritis     Cellulitis     Coronary artery disease     Diabetes mellitus      Diabetic retinopathy     Glaucoma     Gout     Hyperlipemia     Hypertension     Non-pressure chronic ulcer of other part of left foot with fat layer exposed 10/23/2018    Type 2 diabetes mellitus with left diabetic foot ulcer 10/29/2018     Past Surgical History:   Procedure Laterality Date    AHMED GLAUCOMA IMPLANT Left 2011    DONE AT [facility]    BAERVELDT GLAUCOMA IMPLANT Left 2012    WITH CATARACT EXTRACTION//DONE AT [facility]    CATARACT EXTRACTION W/  INTRAOCULAR LENS IMPLANT Left 2012    WITH BAERVEDT//DONE AT [facility]    CATARACT EXTRACTION W/  INTRAOCULAR LENS IMPLANT Right 09/26/2018    COMPLEX ()    CB DESTRUCTION WITH CYCLO G6 Left 02/15/2017        CYST REMOVAL      HEART CATH-LEFT N/A 5/6/2016    Performed by Mike Magana MD at [facility] CATH LAB    INSERTION, ICD GENERATOR, SINGLE CHAMBER N/A 11/2/2018    Performed by Pernell Greer [facility] at [facility] CATH LAB    INSERTION, IOL PROSTHESIS Right 9/26/2018    Performed by Perla Cortés MD at [facility] OR [address] [place] [address]    INTRAOCULAR PROSTHESES INSERTION Right 9/26/2018    Procedure: INSERTION, IOL PROSTHESIS;  Surgeon: Pelra Cortés MD;  Location: [facility] OR [address] [place] [address];  Service: Ophthalmology;  Laterality: Right;    PHACOEMULSIFICATION OF CATARACT Right 9/26/2018    Procedure: PHACOEMULSIFICATION, CATARACT;  Surgeon: Perla Cortés MD;  Location: [facility] OR [address] [place] [address];  Service: Ophthalmology;  Laterality: Right;    PHACOEMULSIFICATION, CATARACT Right 9/26/2018    Performed by Perla Cortés MD at [facility] OR [address] [place] [address]    Right foot surgery  10/2014    TOE AMPUTATION      TONSILLECTOMY      TRABECULECTOMY/G 6 LASER Left 2/15/2017    Performed by Perla Cortés MD at [facility] OR [address] [place] [address]     Family History   Problem Relation Age of Onset    Diabetes Mother     Heart disease Brother     No Known Problems Father     No Known Problems Sister     No Known Problems Maternal Aunt     No Known Problems Maternal  Uncle     No Known Problems Paternal Aunt     No Known Problems Paternal Uncle     No Known Problems Maternal Grandmother     No Known Problems Maternal Grandfather     No Known Problems Paternal Grandmother     No Known Problems Paternal Grandfather     Anemia Neg Hx     Arrhythmia Neg Hx     Asthma Neg Hx     Clotting disorder Neg Hx     Fainting Neg Hx     Heart attack Neg Hx     Heart failure Neg Hx     Hyperlipidemia Neg Hx     Hypertension Neg Hx     Stroke Neg Hx     Atrial Septal Defect Neg Hx     Amblyopia Neg Hx     Blindness Neg Hx     Glaucoma Neg Hx     Macular degeneration Neg Hx     Retinal detachment Neg Hx     Strabismus Neg Hx      Social History     Socioeconomic History    Marital status: Legally      Spouse name: Not on file    Number of children: Not on file    Years of education: 14    Highest education level: Not on file   Social Needs    Financial resource strain: Not on file    Food insecurity - worry: Not on file    Food insecurity - inability: Not on file    Transportation needs - medical: Not on file    Transportation needs - non-medical: Not on file   Occupational History    Not on file   Tobacco Use    Smoking status: Former Smoker     Packs/day: 1.00     Years: 3.00     Pack years: 3.00     Last attempt to quit: 1984     Years since quittin.3    Smokeless tobacco: Never Used   Substance and Sexual Activity    Alcohol use: Yes     Alcohol/week: 0.6 oz     Types: 1 Cans of beer per week     Comment: Occasional    Drug use: No    Sexual activity: Not Currently   Other Topics Concern    Not on file   Social History Narrative    Not on file       Current Outpatient Medications:     allopurinol (ZYLOPRIM) 100 MG tablet, Take 2 tablets (200 mg total) by mouth once daily., Disp: 180 tablet, Rfl: 3    amLODIPine (NORVASC) 5 MG tablet, TAKE 1 TABLET(5 MG) BY MOUTH EVERY DAY, Disp: 30 tablet, Rfl: 0    aspirin 81 MG Chew, Take 81 mg  "by mouth once daily., Disp: , Rfl:     benazepril (LOTENSIN) 40 MG tablet, TAKE 1 TABLET(40 MG) BY MOUTH TWICE DAILY, Disp: 60 tablet, Rfl: 0    brimonidine 0.1% (ALPHAGAN P) 0.1 % Drop, Place 1 drop into both eyes 3 (three) times daily., Disp: 15 mL, Rfl: 12    carvedilol (COREG) 6.25 MG tablet, Take 1 tablet (6.25 mg total) by mouth 2 (two) times daily., Disp: 60 tablet, Rfl: 11    coenzyme Q10 100 mg capsule, Take 100 mg by mouth every morning., Disp: , Rfl:     dorzolamide-timolol 2-0.5% (COSOPT) 22.3-6.8 mg/mL ophthalmic solution, Place 1 drop into both eyes 3 (three) times daily., Disp: 10 mL, Rfl: 12    ezetimibe (ZETIA) 10 mg tablet, Take 1 tablet (10 mg total) by mouth once daily., Disp: 30 tablet, Rfl: 2    fish oil-omega-3 fatty acids 300-1,000 mg capsule, Take 2 capsules (2 g total) by mouth 2 (two) times daily. (Patient taking differently: Take 1 g by mouth 2 (two) times daily. ), Disp: 120 capsule, Rfl: 5    furosemide (LASIX) 20 MG tablet, Take 1 tablet (20 mg total) by mouth once daily., Disp: 30 tablet, Rfl: 3    insulin glargine-lixisenatide (SOLIQUA 100/33) 100 unit-33 mcg/mL InPn, Inject 34 units once daily, Disp: 5 Syringe, Rfl: 2    ketorolac 0.5% (ACULAR) 0.5 % Drop, Place 1 drop into the right eye 3 (three) times daily., Disp: 1 Bottle, Rfl: 2    MULTIVIT,THER IRON,CA,FA & MIN (MULTIVITAMIN) Tab, Take 1 tablet by mouth every morning. , Disp: , Rfl:     pen needle, diabetic 31 gauge x 1/4" Ndle, Use as directed, Disp: 100 each, Rfl: 11    prednisoLONE acetate (PRED FORTE) 1 % DrpS, Place 1 drop into the right eye 3 (three) times daily., Disp: 1 Bottle, Rfl: 2    spironolactone (ALDACTONE) 100 MG tablet, Take 1 tablet (100 mg total) by mouth once daily., Disp: 30 tablet, Rfl: 11    sulfamethoxazole-trimethoprim 800-160mg (BACTRIM DS) 800-160 mg Tab, Take 1 tablet by mouth 2 (two) times daily. for 10 days, Disp: 20 tablet, Rfl: 0    REVIEW OF SYSTEMS:  General: No fevers or " chills; ENT: No sore throat; Allergy and Immunology: no persistent infections; Hematological and Lymphatic: No history of bleeding or easy bruising; Endocrine: negative; Respiratory: no cough, shortness of breath, or wheezing; Cardiovascular: no chest pain or dyspnea on exertion; Gastrointestinal: no abdominal pain/back, change in bowel habits, or bloody stools; Genito-Urinary: no dysuria, trouble voiding, or hematuria; Musculoskeletal: negative; Neurological: no TIA or stroke symptoms; Psychiatric: no nervousness, anxiety or depression.    PHYSICAL EXAM:                General appearance:  Alert, well-appearing, and in no distress.  Oriented to person, place, and time                    Neurological: Normal speech, no focal findings noted; CN II - XII grossly intact. RLE with sensation to light touch, LLE with sensation to light touch.            Musculoskeletal: Digits/nail without cyanosis/clubbing.  Strength 5/5 BLE.                    Neck: Supple, no significant adenopathy                  Chest:  Clear to auscultation, no wheezes, rales or rhonchi, symmetric air entry. No use of accessory muscles               Cardiac: Normal rate and regular rhythm, S1 and S2 normal            Abdomen: Soft, nontender, nondistended, no masses or organomegaly, no hernia     No rebound tenderness noted; bowel sounds normal     No groin adenopathy      Extremities:   2+ R femoral pulse, 2+ L femoral pulse     doppler+ R popliteal pulse, doppler+ L popliteal pulse     doppler+ R PT pulse, doppler+ L PT pulse     doppler+ R DP pulse, doppler+ L DP pulse     1+ RLE edema, 2+ LLE edema    Skin: RLE with ulcer to anterior shin; LLE with extensive foot wound with eschar and granulation tissue, mild odor, no purulence or erythema    LAB RESULTS:  No results found for: CBC  Lab Results   Component Value Date    LABPROT 11.4 10/30/2018    INR 1.1 10/30/2018     Lab Results   Component Value Date     10/30/2018    K 5.8 (H)  10/30/2018     10/30/2018    CO2 20 (L) 10/30/2018    GLU 86 10/30/2018    BUN 44 (H) 10/30/2018    CREATININE 1.3 10/30/2018    CALCIUM 9.4 10/30/2018    ANIONGAP 9 10/30/2018    EGFRNONAA 59 (A) 10/30/2018     Lab Results   Component Value Date    WBC 9.59 10/30/2018    RBC 3.47 (L) 10/30/2018    HGB 11.0 (L) 10/30/2018    HCT 32.1 (L) 10/30/2018    MCV 93 10/30/2018    MCH 31.7 (H) 10/30/2018    MCHC 34.3 10/30/2018    RDW 17.6 (H) 10/30/2018     (H) 10/30/2018    MPV 9.5 10/30/2018    GRAN 7.7 10/30/2018    GRAN 80.5 (H) 10/30/2018    LYMPH 0.8 (L) 10/30/2018    LYMPH 7.9 (L) 10/30/2018    MONO 1.0 10/30/2018    MONO 10.4 10/30/2018    EOS 0.1 10/30/2018    BASO 0.02 10/30/2018    EOSINOPHIL 1.0 10/30/2018    BASOPHIL 0.2 10/30/2018    DIFFMETHOD Automated 10/30/2018     .  Lab Results   Component Value Date    HGBA1C 8.3 (H) 10/30/2018       IMAGING:  All pertinent imaging has been reviewed and interpreted independently.    IMP/PLAN:  58 y.o. 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, with long-term current use of insulin    Neovascular glaucoma of left eye, severe stage    Statin myopathy    Neovascular glaucoma, right eye, mild stage    Left leg cellulitis    PVD (peripheral vascular disease)    Right wrist pain    Multiple open wounds of lower leg    Hepatosplenomegaly    Stage 3 chronic kidney disease    Chronic combined systolic and diastolic heart failure    Non-pressure chronic ulcer of other part of left foot with fat layer  exposed    Type 2 diabetes mellitus with left diabetic foot ulcer    being managed by PCP and specialists who is here today for evaluation of L foot wound.    -L foot wound worsening, multifactorial due to diabetes, PVD and venous insufficiency - rec admission to hospital after eval and treatment in the ER  -Plan for LLE angiogram  -Wound care consult inpatient  -NPO at midnight  -Strict glycemic control    I spent 20 minutes evaluating this patient and greater than 50% of the time was spent counseling, coordinator care and discussing the plan of care.  All questions were answered and patient stated understanding with agreement with the above treatment plan.    Mitch Chan MD [facility]  Vascular and Endovascular Surgery

## 2018-11-08 NOTE — NURSING
Report on patient received from meri manzano rn on patient diagnosis and treatment in emergency department assigned to room 300 . Awaiting patient to arrived to floor.

## 2018-11-08 NOTE — LETTER
November 8, 2018        Rubén Davis MD  [address] [facility] [address]             [facility]  [address] [facility] [address]  [facility] [address]  Phone: [phone]  Fax: [phone]   Patient: Marvin Ray   MR Number: [id number]   YOB: 1960   Date of Visit: 11/8/2018       Dear Dr. Davis:    Thank you for referring Marvin Ray to me for evaluation. Below are the relevant portions of my assessment and plan of care.            If you have questions, please do not hesitate to call me. I look forward to following Marvin along with you.    Sincerely,      Mitch Chan MD           CC  No Recipients

## 2018-11-08 NOTE — ASSESSMENT & PLAN NOTE
Pt has reported baseline CKD3  Recently placed on bactrim in addition to acei and aldactone/lasix - hold offending agents  Gentle IVF while NPO   BMP in am  Renal dose antibiotics  Avoid nephrotoxins

## 2018-11-08 NOTE — NURSING
Dressing change perform to left lower leg tolerated well sundance boots to both lower legs tolerating well have on bedpan . No changes on telemetry ate all of supper meal blood sugar 76 no coverage needed.

## 2018-11-08 NOTE — CONSULTS
[facility] [place]  Consult Note    Inpatient consult to Vascular Surgery  Consult performed by: Mitch Chan MD  Consult ordered by: Rip Greenfield Jr., MD  Reason for consult: L foot wound        Subjective:     Chief Complaint/Reason for Admission: L foot wound    History of Present Illness:             Mitch Chan MD [facility]                         11/08/2018    HPI:  Marvin Ray is a 58 y.o. 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, with long-term current use of insulin    Neovascular glaucoma of left eye, severe stage    Statin myopathy    Neovascular glaucoma, right eye, mild stage    Left leg cellulitis    PVD (peripheral vascular disease)    Right wrist pain    Multiple open wounds of lower leg    Hepatosplenomegaly    Stage 3 chronic kidney disease    Chronic combined systolic and diastolic heart failure    Non-pressure chronic ulcer of other part of left foot with fat layer exposed    Type 2 diabetes mellitus with left diabetic foot ulcer    Cellulitis    being managed by PCP and specialists who was seen today for evaluation of L foot wound.  Seen in hospital last mo with LLE cellulitis.  Treated with Abx.  Also had paracentesis for ascites with negative w/u per family.  Patient states location is L foot occurring for 1-2 mo, worsening foot wound although improvement in edema and erythema.  Associated signs and symptoms include pain and  edema.  Quality is aching and severity is 5/10.  Symptoms began 10/2018 spontaneously with blistering and severe edema.  Alleviating factors include wound care and elevation.  Worsening factors include pressure.  It was recommended that pt be admitted to the hospital for his worsening wound with plans for wound care, possible Abx and angiography.     Tobacco use: denies    Past Medical History:   Diagnosis Date    Arthritis     Cellulitis     CKD (chronic kidney disease), stage III     Coronary artery disease     Diabetes mellitus     Diabetic retinopathy     Diabetic ulcer of left foot     Glaucoma     Gout     Hyperlipemia     Hypertension     ICD (implantable cardioverter-defibrillator) in place 11/02/2018    Left chest    Non-pressure chronic ulcer of other part of left foot with fat layer exposed 10/23/2018    PVD (peripheral vascular disease)     Type 2 diabetes mellitus with left diabetic foot ulcer 10/29/2018    Unsteady gait     uses a wheelchair     Past Surgical History:   Procedure Laterality Date    AHMED GLAUCOMA IMPLANT Left 2011    DONE AT [facility]    BAERVELDT GLAUCOMA IMPLANT Left 2012    WITH CATARACT EXTRACTION//DONE AT [facility]    CARDIAC CATHETERIZATION Left 05/2016    CARDIAC DEFIBRILLATOR PLACEMENT Left 11/02/2018    CATARACT EXTRACTION W/  INTRAOCULAR LENS IMPLANT Left 2012    WITH BAERVEDT//DONE AT [facility]    CATARACT EXTRACTION W/  INTRAOCULAR LENS IMPLANT Right 09/26/2018    COMPLEX ()    CB DESTRUCTION WITH CYCLO G6 Left 02/15/2017        CYST REMOVAL      HEART CATH-LEFT N/A 5/6/2016    Performed by Mike Magana MD at [facility] CATH LAB    INSERTION, ICD GENERATOR, SINGLE CHAMBER N/A 11/2/2018    Performed by Pernell Greer [facility] at [facility] CATH LAB    INSERTION, IOL PROSTHESIS Right 9/26/2018    Performed by Perla Cortés MD at [facility] OR [facility]    INTRAOCULAR PROSTHESES INSERTION Right 9/26/2018    Procedure: INSERTION, IOL  PROSTHESIS;  Surgeon: Perla Cortés MD;  Location: [address];  Service: Ophthalmology;  Laterality: Right;    PHACOEMULSIFICATION OF CATARACT Right 9/26/2018    Procedure: PHACOEMULSIFICATION, CATARACT;  Surgeon: Perla Cortés MD;  Location: [address];  Service: Ophthalmology;  Laterality: Right;    PHACOEMULSIFICATION, CATARACT Right 9/26/2018    Performed by [facility] at [facility] OR [address] [place] [address]    Right foot surgery  10/2014    TOE AMPUTATION Right     first and second    TONSILLECTOMY      TRABECULECTOMY/G 6 LASER Left 2/15/2017    Performed by Perla Cortés MD at [facility] OR [address] [place] [address]     Family History   Problem Relation Age of Onset    Diabetes Mother     Heart disease Brother     No Known Problems Father     No Known Problems Sister     No Known Problems Maternal Aunt     No Known Problems Maternal Uncle     No Known Problems Paternal Aunt     No Known Problems Paternal Uncle     No Known Problems Maternal Grandmother     No Known Problems Maternal Grandfather     No Known Problems Paternal Grandmother     No Known Problems Paternal Grandfather     Anemia Neg Hx     Arrhythmia Neg Hx     Asthma Neg Hx     Clotting disorder Neg Hx     Fainting Neg Hx     Heart attack Neg Hx     Heart failure Neg Hx     Hyperlipidemia Neg Hx     Hypertension Neg Hx     Stroke Neg Hx     Atrial Septal Defect Neg Hx     Amblyopia Neg Hx     Blindness Neg Hx     Glaucoma Neg Hx     Macular degeneration Neg Hx     Retinal detachment Neg Hx     Strabismus Neg Hx      Social History     Socioeconomic History    Marital status: Legally      Spouse name: Not on file    Number of children: Not on file    Years of education: 14    Highest education level: Not on file   Social Needs    Financial resource strain: Not on file    Food insecurity - worry: Not on file    Food insecurity - inability: Not on file    Transportation needs - medical: Not on file     Transportation needs - non-medical: Not on file   Occupational History    Not on file   Tobacco Use    Smoking status: Former Smoker     Packs/day: 1.00     Years: 3.00     Pack years: 3.00     Last attempt to quit: 1984     Years since quittin.3    Smokeless tobacco: Never Used   Substance and Sexual Activity    Alcohol use: Yes     Alcohol/week: 0.6 oz     Types: 1 Cans of beer per week     Comment: Occasional    Drug use: No    Sexual activity: Not Currently   Other Topics Concern    Not on file   Social History Narrative    Not on file       Current Facility-Administered Medications:     acetaminophen tablet 650 mg, 650 mg, Oral, Q6H PRN, Rip Greenfield Jr., MD    amLODIPine tablet 5 mg, 5 mg, Oral, Daily, Rip Greenfield Jr., MD    aspirin chewable tablet 81 mg, 81 mg, Oral, Daily, Rip Greenfield Jr., MD    carvedilol tablet 6.25 mg, 6.25 mg, Oral, BID, Rip Greenfield Jr., MD    cefTRIAXone (ROCEPHIN) 1 g in dextrose 5 % 50 mL IVPB, 1 g, Intravenous, Q12H, Rip Greenfield Jr., MD    dextrose 50% injection 12.5 g, 12.5 g, Intravenous, PRN, Rip Greenfield Jr., MD    dextrose 50% injection 25 g, 25 g, Intravenous, PRN, Rip Greenfield Jr., MD    enoxaparin injection 30 mg, 30 mg, Subcutaneous, Daily, Rip Greenfield Jr., MD    ezetimibe tablet 10 mg, 10 mg, Oral, Daily, Rip Greenfield Jr., MD    furosemide tablet 20 mg, 20 mg, Oral, Daily, Rip Greenfield Jr., MD    glucagon (human recombinant) injection 1 mg, 1 mg, Intramuscular, PRN, Rip Greenfield Jr., MD    glucose chewable tablet 16 g, 16 g, Oral, PRN, Rip Greenfield Jr., MD    glucose chewable tablet 24 g, 24 g, Oral, PRN, Rip Greenfield Jr., MD    influenza (FLUZONE QUADRIVALENT) vaccine 0.5 mL, 0.5 mL, Intramuscular, vaccine x 1 dose, Rip Greenfield Jr., MD    insulin aspart U-100 pen 0-5 Units, 0-5 Units, Subcutaneous, QID (AC + HS) PRN, Rip Greenfield Jr., MD     senna-docusate 8.6-50 mg per tablet 1 tablet, 1 tablet, Oral, BID, Rip Greenfield Jr., MD      Medications Prior to Admission   Medication Sig Dispense Refill Last Dose    allopurinol (ZYLOPRIM) 100 MG tablet Take 2 tablets (200 mg total) by mouth once daily. 180 tablet 3 11/8/2018    amLODIPine (NORVASC) 5 MG tablet TAKE 1 TABLET(5 MG) BY MOUTH EVERY DAY 30 tablet 0 11/8/2018    aspirin 81 MG Chew Take 81 mg by mouth once daily.   Unknown at Unknown time    benazepril (LOTENSIN) 40 MG tablet TAKE 1 TABLET(40 MG) BY MOUTH TWICE DAILY 60 tablet 0 Unknown at Unknown time    brimonidine 0.1% (ALPHAGAN P) 0.1 % Drop Place 1 drop into both eyes 3 (three) times daily. 15 mL 12 Unknown at Unknown time    carvedilol (COREG) 6.25 MG tablet Take 1 tablet (6.25 mg total) by mouth 2 (two) times daily. 60 tablet 11 Unknown at Unknown time    coenzyme Q10 100 mg capsule Take 100 mg by mouth every morning.   Unknown at Unknown time    dorzolamide-timolol 2-0.5% (COSOPT) 22.3-6.8 mg/mL ophthalmic solution Place 1 drop into both eyes 3 (three) times daily. 10 mL 12 Unknown at Unknown time    ezetimibe (ZETIA) 10 mg tablet Take 1 tablet (10 mg total) by mouth once daily. 30 tablet 2 Unknown at Unknown time    fish oil-omega-3 fatty acids 300-1,000 mg capsule Take 2 capsules (2 g total) by mouth 2 (two) times daily. (Patient taking differently: Take 1 g by mouth 2 (two) times daily. ) 120 capsule 5 Unknown at Unknown time    furosemide (LASIX) 20 MG tablet Take 1 tablet (20 mg total) by mouth once daily. 30 tablet 3 Unknown at Unknown time    insulin glargine-lixisenatide (SOLIQUA 100/33) 100 unit-33 mcg/mL InPn Inject 34 units once daily 5 Syringe 2 Unknown at Unknown time    ketorolac 0.5% (ACULAR) 0.5 % Drop Place 1 drop into the right eye 3 (three) times daily. 1 Bottle 2 Unknown at Unknown time    MULTIVIT,THER IRON,CA,FA & MIN (MULTIVITAMIN) Tab Take 1 tablet by mouth every morning.    Unknown at Unknown time  "   pen needle, diabetic 31 gauge x 1/4" Ndle Use as directed 100 each 11 Unknown at Unknown time    prednisoLONE acetate (PRED FORTE) 1 % DrpS Place 1 drop into the right eye 3 (three) times daily. 1 Bottle 2 Unknown at Unknown time    spironolactone (ALDACTONE) 100 MG tablet Take 1 tablet (100 mg total) by mouth once daily. 30 tablet 11 Unknown at Unknown time    sulfamethoxazole-trimethoprim 800-160mg (BACTRIM DS) 800-160 mg Tab Take 1 tablet by mouth 2 (two) times daily. 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    Left chest    Non-pressure chronic ulcer of other part of left foot with fat layer exposed 10/23/2018    PVD (peripheral vascular disease)     Type 2 diabetes mellitus with left diabetic foot ulcer 10/29/2018    Unsteady gait     uses a wheelchair     Past Surgical History:   Procedure Laterality Date    AHMED GLAUCOMA IMPLANT Left 2011    DONE AT [facility]    BAERVELDT GLAUCOMA IMPLANT Left 2012    WITH CATARACT EXTRACTION//DONE AT [facility]    CARDIAC CATHETERIZATION Left 05/2016    CARDIAC DEFIBRILLATOR PLACEMENT Left 11/02/2018    CATARACT EXTRACTION W/  INTRAOCULAR LENS IMPLANT Left 2012    WITH BAERVEDT//DONE AT [facility]    CATARACT EXTRACTION W/  INTRAOCULAR LENS IMPLANT Right 09/26/2018    COMPLEX ()    CB DESTRUCTION WITH CYCLO G6 Left 02/15/2017        CYST REMOVAL      HEART CATH-LEFT N/A 5/6/2016    Performed by Mike" JONATHON Magana MD at [facility] CATH LAB    INSERTION, ICD GENERATOR, SINGLE CHAMBER N/A 2018    Performed by Pernell Greer MD at [facility] CATH LAB    INSERTION, IOL PROSTHESIS Right 2018    Performed by Perla Cortés MD at [facility] OR [address] [place] [address]    INTRAOCULAR PROSTHESES INSERTION Right 2018    Procedure: INSERTION, IOL PROSTHESIS;  Surgeon: Perla Cortés MD;  Location: [facility] OR [address] [place] [address];  Service: Ophthalmology;  Laterality: Right;    PHACOEMULSIFICATION OF CATARACT Right 2018    Procedure: PHACOEMULSIFICATION, CATARACT;  Surgeon: Perla Cortés MD;  Location: [facility] OR [address] [place] [address];  Service: Ophthalmology;  Laterality: Right;    PHACOEMULSIFICATION, CATARACT Right 2018    Performed by Perla Cortés MD at [facility] OR [address] [place] [address]    Right foot surgery  10/2014    TOE AMPUTATION Right     first and second    TONSILLECTOMY      TRABECULECTOMY/G 6 LASER Left 2/15/2017    Performed by Perla Cortés MD at [facility] OR [address] [place] [address]     Family History     Problem Relation (Age of Onset)    Diabetes Mother    Heart disease Brother    No Known Problems Father, Sister, Maternal Aunt, Maternal Uncle, Paternal Aunt, Paternal Uncle, Maternal Grandmother, Maternal Grandfather, Paternal Grandmother, Paternal Grandfather        Tobacco Use    Smoking status: Former Smoker     Packs/day: 1.00     Years: 3.00     Pack years: 3.00     Last attempt to quit: 1984     Years since quittin.3    Smokeless tobacco: Never Used   Substance and Sexual Activity    Alcohol use: Yes     Alcohol/week: 0.6 oz     Types: 1 Cans of beer per week     Comment: Occasional    Drug use: No    Sexual activity: Not Currently     Review of Systems   Constitutional: Negative for chills and fever.   HENT: Negative for congestion.    Eyes: Negative for visual disturbance.   Respiratory: Negative for shortness of breath.    Cardiovascular: Negative for chest pain.   Gastrointestinal: Negative for abdominal pain.    Endocrine: Negative for polyphagia.   Genitourinary: Negative for dysuria.   Musculoskeletal: Negative for back pain.   Skin: Positive for color change and wound. Negative for pallor and rash.   Allergic/Immunologic: Negative for immunocompromised state.   Neurological: Negative for weakness.   Hematological: Does not bruise/bleed easily.   Psychiatric/Behavioral: Negative for confusion.     Objective:     Vital Signs (Most Recent):  Temp: 97.9 °F (36.6 °C) (11/08/18 1515)  Pulse: 75 (11/08/18 1515)  Resp: 18 (11/08/18 1515)  BP: 128/60 (11/08/18 1515)  SpO2: 98 % (11/08/18 1515) Vital Signs (24h Range):  Temp:  [97.9 °F (36.6 °C)-98.3 °F (36.8 °C)] 97.9 °F (36.6 °C)  Pulse:  [67-78] 75  Resp:  [16-21] 18  SpO2:  [96 %-98 %] 98 %  BP: (116-129)/(57-67) 128/60     Weight: 98.9 kg (218 lb)  Body mass index is 31.28 kg/m².    Physical Exam   Constitutional: He is oriented to person, place, and time. He appears well-developed and well-nourished. No distress.   HENT:   Head: Normocephalic and atraumatic.   Eyes: Conjunctivae are normal.   Neck: Neck supple.   Cardiovascular: Normal rate.   Pulses:       Femoral pulses are 2+ on the right side, and 2+ on the left side.       Dorsalis pedis pulses are Detected w/ doppler on the right side, and Detected w/ doppler on the left side.        Posterior tibial pulses are Detected w/ doppler on the right side, and Detected w/ doppler on the left side.   Pulmonary/Chest: Effort normal. No respiratory distress. He exhibits no tenderness.   Abdominal: Soft. He exhibits no distension and no mass. There is no tenderness. There is no rebound and no guarding.   Musculoskeletal: Normal range of motion. He exhibits edema. He exhibits no tenderness or deformity.   Feet:   Right Foot:   Skin Integrity: Negative for ulcer, blister, skin breakdown, callus or dry skin.   Left Foot:   Skin Integrity: Positive for ulcer, blister, skin breakdown, callus and dry skin.   Neurological: He is alert  and oriented to person, place, and time. No sensory deficit.   Skin: Skin is warm and dry. Capillary refill takes less than 2 seconds. No rash noted. No erythema. No pallor.   Psychiatric: He has a normal mood and affect.   Vitals reviewed.      Significant Labs:  All pertinent labs from the last 24 hours have been reviewed.    Significant Diagnostics:  I have reviewed all pertinent imaging results/findings within the past 24 hours.    Assessment/Plan:     Open wound of left foot    -L foot wound multifactorial likely due to DM, PVD and venous insufficiency - rec angiography with possible intervention   -Rec wound care consult  -Offloading  -Abx per primary team  -Strict glycemic control  -NPO at midnight         Thank you for your consult. I will follow-up with patient. Please contact us if you have any additional questions.    Mitch Chan MD  Vascular Surgery  [facility] [place]

## 2018-11-08 NOTE — ASSESSMENT & PLAN NOTE
LLE >R; secondary to PVD  Repeat blood cultures  Rocephin IV Q12 based on last blood culture +GroupG strep  Vascular surgery consulted - see PVD  Pain control  Wound care consulted

## 2018-11-08 NOTE — H&P (VIEW-ONLY)
[facility] [place]  Vascular Surgery  Consult Note    Inpatient consult to Vascular Surgery  Consult performed by: Mitch Chan MD  Consult ordered by: Rip Greenfield Jr., MD  Reason for consult: L foot wound        Subjective:     Chief Complaint/Reason for Admission: L foot wound    History of Present Illness:             Mitch Chan MD [facility]I [facility]                         11/08/2018    HPI:  Marvin Ray is a 58 y.o. 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, with long-term current use of insulin    Neovascular glaucoma of left eye, severe stage    Statin myopathy    Neovascular glaucoma, right eye, mild stage    Left leg cellulitis    PVD (peripheral vascular disease)    Right wrist pain    Multiple open wounds of lower leg    Hepatosplenomegaly    Stage 3 chronic kidney disease    Chronic combined systolic and diastolic heart failure    Non-pressure chronic ulcer of other part of left foot with fat layer exposed    Type 2 diabetes mellitus with left diabetic foot ulcer    Cellulitis    being managed by PCP and specialists who was seen today for evaluation of L foot wound.  Seen in hospital last mo with LLE cellulitis.  Treated with Abx.  Also had paracentesis for ascites with negative w/u per family.  Patient states location is L foot occurring for 1-2 mo, worsening foot wound although improvement in edema and erythema.  Associated signs and symptoms include pain and  edema.  Quality is aching and severity is 5/10.  Symptoms began 10/2018 spontaneously with blistering and severe edema.  Alleviating factors include wound care and elevation.  Worsening factors include pressure.  It was recommended that pt be admitted to the hospital for his worsening wound with plans for wound care, possible Abx and angiography.     Tobacco use: denies    Past Medical History:   Diagnosis Date    Arthritis     Cellulitis     CKD (chronic kidney disease), stage III     Coronary artery disease     Diabetes mellitus     Diabetic retinopathy     Diabetic ulcer of left foot     Glaucoma     Gout     Hyperlipemia     Hypertension     ICD (implantable cardioverter-defibrillator) in place 11/02/2018    Left chest    Non-pressure chronic ulcer of other part of left foot with fat layer exposed 10/23/2018    PVD (peripheral vascular disease)     Type 2 diabetes mellitus with left diabetic foot ulcer 10/29/2018    Unsteady gait     uses a wheelchair     Past Surgical History:   Procedure Laterality Date    AHMED [facility]LAUCOMA [facility]PLANT [facility]    BAERVELDT GLAUCOMA IMPLANT Left 2012    WITH CATARACT EXTRACTION//DONE AT [facility]    CARDIAC CATHETERIZATION Left 05/2016    CARDIAC DEFIBRILLATOR PLACEMENT Left 11/02/2018    CATARACT EXTRACTION W/  INTRAOCULAR LENS IMPLANT Left 2012    WITH BAERVEDT//DONE AT [facility]    CATARACT EXTRACTION W/  INTRAOCULAR LENS IMPLANT Right 09/26/2018    COMPLEX ()    CB DESTRUCTION WITH CYCLO G6 Left 02/15/2017        CYST REMOVAL      HEART CATH-LEFT N/A 5/6/2016    Performed by Mike Magana MD at [facility] CATH LAB    INSERTION, ICD GENERATOR, SINGLE CHAMBER N/A 11/2/2018    Performed by Pernell Greer MD at [facility] CATH LAB    INSERTION, IOL PROSTHESIS Right 9/26/2018    Performed by Perla Cortés MD at [facility] OR [facility]    INTRAOCULAR PROSTHESES INSERTION Right 9/26/2018    Procedure: INSERTION, IOL  PROSTHESIS;  Surgeon: Perla Cortés MD;  Location: [address];  Service: Ophthalmology;  Laterality: Right;    PHACOEMULSIFICATION OF CATARACT Right 9/26/2018    Procedure: PHACOEMULSIFICATION, CATARACT;  Surgeon: Perla Cortés MD;  Location: [address];  Service: Ophthalmology;  Laterality: Right;    PHACOEMULSIFICATION, CATARACT Right 9/26/2018    Performed by Perla Cortés MD at [facility] OR [address] [place] [address]    Right foot surgery  10/2014    TOE AMPUTATION Right     first and second    TONSILLECTOMY      TRABECULECTOMY/G 6 LASER Left 2/15/2017    Performed by Perla Cortés MD at [facility] OR [address] [place] [address]     Family History   Problem Relation Age of Onset    Diabetes Mother     Heart disease Brother     No Known Problems Father     No Known Problems Sister     No Known Problems Maternal Aunt     No Known Problems Maternal Uncle     No Known Problems Paternal Aunt     No Known Problems Paternal Uncle     No Known Problems Maternal Grandmother     No Known Problems Maternal Grandfather     No Known Problems Paternal Grandmother     No Known Problems Paternal Grandfather     Anemia Neg Hx     Arrhythmia Neg Hx     Asthma Neg Hx     Clotting disorder Neg Hx     Fainting Neg Hx     Heart attack Neg Hx     Heart failure Neg Hx     Hyperlipidemia Neg Hx     Hypertension Neg Hx     Stroke Neg Hx     Atrial Septal Defect Neg Hx     Amblyopia Neg Hx     Blindness Neg Hx     Glaucoma Neg Hx     Macular degeneration Neg Hx     Retinal detachment Neg Hx     Strabismus Neg Hx      Social History     Socioeconomic History    Marital status: Legally      Spouse name: Not on file    Number of children: Not on file    Years of education: 14    Highest education level: Not on file   Social Needs    Financial resource strain: Not on file    Food insecurity - worry: Not on file    Food insecurity - inability: Not on file    Transportation needs - medical: Not on file     Transportation needs - non-medical: Not on file   Occupational History    Not on file   Tobacco Use    Smoking status: Former Smoker     Packs/day: 1.00     Years: 3.00     Pack years: 3.00     Last attempt to quit: 1984     Years since quittin.3    Smokeless tobacco: Never Used   Substance and Sexual Activity    Alcohol use: Yes     Alcohol/week: 0.6 oz     Types: 1 Cans of beer per week     Comment: Occasional    Drug use: No    Sexual activity: Not Currently   Other Topics Concern    Not on file   Social History Narrative    Not on file       Current Facility-Administered Medications:     acetaminophen tablet 650 mg, 650 mg, Oral, Q6H PRN, Rip Greenfield Jr., MD    amLODIPine tablet 5 mg, 5 mg, Oral, Daily, Rip Greenfield Jr., MD    aspirin chewable tablet 81 mg, 81 mg, Oral, Daily, Rip Greenfield Jr., MD    carvedilol tablet 6.25 mg, 6.25 mg, Oral, BID, Rip Greenfield Jr., MD    cefTRIAXone (ROCEPHIN) 1 g in dextrose 5 % 50 mL IVPB, 1 g, Intravenous, Q12H, Rip Greenfield Jr., MD    dextrose 50% injection 12.5 g, 12.5 g, Intravenous, PRN, Rip Greenfield Jr., MD    dextrose 50% injection 25 g, 25 g, Intravenous, PRN, Rip Greenfield Jr., MD    enoxaparin injection 30 mg, 30 mg, Subcutaneous, Daily, Rip Greenfield Jr., MD    ezetimibe tablet 10 mg, 10 mg, Oral, Daily, Rip Greenfield Jr., MD    furosemide tablet 20 mg, 20 mg, Oral, Daily, Rip Greenfield Jr., MD    glucagon (human recombinant) injection 1 mg, 1 mg, Intramuscular, PRN, Rip Greenfield Jr., MD    glucose chewable tablet 16 g, 16 g, Oral, PRN, Rip Greenfield Jr., MD    glucose chewable tablet 24 g, 24 g, Oral, PRN, Rip Greenfield Jr., MD    influenza (FLUZONE QUADRIVALENT) vaccine 0.5 mL, 0.5 mL, Intramuscular, vaccine x 1 dose, Rip Greenfield Jr., MD    insulin aspart U-100 pen 0-5 Units, 0-5 Units, Subcutaneous, QID (AC + HS) PRN, Rip Greenfield Jr., MD     senna-docusate 8.6-50 mg per tablet 1 tablet, 1 tablet, Oral, BID, Rip Greenfield Jr., MD      Medications Prior to Admission   Medication Sig Dispense Refill Last Dose    allopurinol (ZYLOPRIM) 100 MG tablet Take 2 tablets (200 mg total) by mouth once daily. 180 tablet 3 11/8/2018    amLODIPine (NORVASC) 5 MG tablet TAKE 1 TABLET(5 MG) BY MOUTH EVERY DAY 30 tablet 0 11/8/2018    aspirin 81 MG Chew Take 81 mg by mouth once daily.   Unknown at Unknown time    benazepril (LOTENSIN) 40 MG tablet TAKE 1 TABLET(40 MG) BY MOUTH TWICE DAILY 60 tablet 0 Unknown at Unknown time    brimonidine 0.1% (ALPHAGAN P) 0.1 % Drop Place 1 drop into both eyes 3 (three) times daily. 15 mL 12 Unknown at Unknown time    carvedilol (COREG) 6.25 MG tablet Take 1 tablet (6.25 mg total) by mouth 2 (two) times daily. 60 tablet 11 Unknown at Unknown time    coenzyme Q10 100 mg capsule Take 100 mg by mouth every morning.   Unknown at Unknown time    dorzolamide-timolol 2-0.5% (COSOPT) 22.3-6.8 mg/mL ophthalmic solution Place 1 drop into both eyes 3 (three) times daily. 10 mL 12 Unknown at Unknown time    ezetimibe (ZETIA) 10 mg tablet Take 1 tablet (10 mg total) by mouth once daily. 30 tablet 2 Unknown at Unknown time    fish oil-omega-3 fatty acids 300-1,000 mg capsule Take 2 capsules (2 g total) by mouth 2 (two) times daily. (Patient taking differently: Take 1 g by mouth 2 (two) times daily. ) 120 capsule 5 Unknown at Unknown time    furosemide (LASIX) 20 MG tablet Take 1 tablet (20 mg total) by mouth once daily. 30 tablet 3 Unknown at Unknown time    insulin glargine-lixisenatide (SOLIQUA 100/33) 100 unit-33 mcg/mL InPn Inject 34 units once daily 5 Syringe 2 Unknown at Unknown time    ketorolac 0.5% (ACULAR) 0.5 % Drop Place 1 drop into the right eye 3 (three) times daily. 1 Bottle 2 Unknown at Unknown time    MULTIVIT,THER IRON,CA,FA & MIN (MULTIVITAMIN) Tab Take 1 tablet by mouth every morning.    Unknown at Unknown time  "   pen needle, diabetic 31 gauge x 1/4" Ndle Use as directed 100 each 11 Unknown at Unknown time    prednisoLONE acetate (PRED FORTE) 1 % DrpS Place 1 drop into the right eye 3 (three) times daily. 1 Bottle 2 Unknown at Unknown time    spironolactone (ALDACTONE) 100 MG tablet Take 1 tablet (100 mg total) by mouth once daily. 30 tablet 11 Unknown at Unknown time    sulfamethoxazole-trimethoprim 800-160mg (BACTRIM DS) 800-160 mg Tab Take 1 tablet by mouth 2 (two) times daily. 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    Left chest    Non-pressure chronic ulcer of other part of left foot with fat layer exposed 10/23/2018    PVD (peripheral vascular disease)     Type 2 diabetes mellitus with left diabetic foot ulcer 10/29/2018    Unsteady gait     uses a wheelchair     Past Surgical History:   Procedure Laterality Date    AHMED GLAUCOMA IMPLANT Left 2011    DONE AT [facility]    BAERVELDT GLAUCOMA IMPLANT Left 2012    WITH CATARACT EXTRACTION//DONE AT [facility]    CARDIAC CATHETERIZATION Left 05/2016    CARDIAC DEFIBRILLATOR PLACEMENT Left 11/02/2018    CATARACT EXTRACTION W/  INTRAOCULAR LENS IMPLANT Left 2012    WITH BAERVEDT//DONE AT [facility]    CATARACT EXTRACTION W/  INTRAOCULAR LENS IMPLANT Right 09/26/2018    COMPLEX ()    CB DESTRUCTION WITH CYCLO G6 Left 02/15/2017        CYST REMOVAL      HEART CATH-LEFT N/A 5/6/2016    Performed by Mike" JONATHON Magana MD at [facility] CATH LAB    INSERTION, ICD GENERATOR, SINGLE CHAMBER N/A 2018    Performed by [facility]ell Greer [facility] at [facility] CATH LAB    INSERTION, IOL PROSTHESIS Right 2018    Performed by Perla Cortés MD at [facility] OR [address] [place] [address]    INTRAOCULAR PROSTHESES INSERTION Right 2018    Procedure: INSERTION, IOL PROSTHESIS;  Surgeon: Perla Cortés MD;  Location: [facility] OR [address] [place] [address];  Service: Ophthalmology;  Laterality: Right;    PHACOEMULSIFICATION OF CATARACT Right 2018    Procedure: PHACOEMULSIFICATION, CATARACT;  Surgeon: Perla Cortés MD;  Location: [facility] OR [address] [place] [address];  Service: Ophthalmology;  Laterality: Right;    PHACOEMULSIFICATION, CATARACT Right 2018    Performed by Perla Cortés MD at [facility] OR [address] [place] [address]    Right foot surgery  10/2014    TOE AMPUTATION Right     first and second    TONSILLECTOMY      TRABECULECTOMY/G 6 LASER Left 2/15/2017    Performed by Perla Cortés MD at [facility] OR [address] [place] [address]     Family History     Problem Relation (Age of Onset)    Diabetes Mother    Heart disease Brother    No Known Problems Father, Sister, Maternal Aunt, Maternal Uncle, Paternal Aunt, Paternal Uncle, Maternal Grandmother, Maternal Grandfather, Paternal Grandmother, Paternal Grandfather        Tobacco Use    Smoking status: Former Smoker     Packs/day: 1.00     Years: 3.00     Pack years: 3.00     Last attempt to quit: 1984     Years since quittin.3    Smokeless tobacco: Never Used   Substance and Sexual Activity    Alcohol use: Yes     Alcohol/week: 0.6 oz     Types: 1 Cans of beer per week     Comment: Occasional    Drug use: No    Sexual activity: Not Currently     Review of Systems   Constitutional: Negative for chills and fever.   HENT: Negative for congestion.    Eyes: Negative for visual disturbance.   Respiratory: Negative for shortness of breath.    Cardiovascular: Negative for chest pain.   Gastrointestinal: Negative for abdominal pain.    Endocrine: Negative for polyphagia.   Genitourinary: Negative for dysuria.   Musculoskeletal: Negative for back pain.   Skin: Positive for color change and wound. Negative for pallor and rash.   Allergic/Immunologic: Negative for immunocompromised state.   Neurological: Negative for weakness.   Hematological: Does not bruise/bleed easily.   Psychiatric/Behavioral: Negative for confusion.     Objective:     Vital Signs (Most Recent):  Temp: 97.9 °F (36.6 °C) (11/08/18 1515)  Pulse: 75 (11/08/18 1515)  Resp: 18 (11/08/18 1515)  BP: 128/60 (11/08/18 1515)  SpO2: 98 % (11/08/18 1515) Vital Signs (24h Range):  Temp:  [97.9 °F (36.6 °C)-98.3 °F (36.8 °C)] 97.9 °F (36.6 °C)  Pulse:  [67-78] 75  Resp:  [16-21] 18  SpO2:  [96 %-98 %] 98 %  BP: (116-129)/(57-67) 128/60     Weight: 98.9 kg (218 lb)  Body mass index is 31.28 kg/m².    Physical Exam   Constitutional: He is oriented to person, place, and time. He appears well-developed and well-nourished. No distress.   HENT:   Head: Normocephalic and atraumatic.   Eyes: Conjunctivae are normal.   Neck: Neck supple.   Cardiovascular: Normal rate.   Pulses:       Femoral pulses are 2+ on the right side, and 2+ on the left side.       Dorsalis pedis pulses are Detected w/ doppler on the right side, and Detected w/ doppler on the left side.        Posterior tibial pulses are Detected w/ doppler on the right side, and Detected w/ doppler on the left side.   Pulmonary/Chest: Effort normal. No respiratory distress. He exhibits no tenderness.   Abdominal: Soft. He exhibits no distension and no mass. There is no tenderness. There is no rebound and no guarding.   Musculoskeletal: Normal range of motion. He exhibits edema. He exhibits no tenderness or deformity.   Feet:   Right Foot:   Skin Integrity: Negative for ulcer, blister, skin breakdown, callus or dry skin.   Left Foot:   Skin Integrity: Positive for ulcer, blister, skin breakdown, callus and dry skin.   Neurological: He is alert  and oriented to person, place, and time. No sensory deficit.   Skin: Skin is warm and dry. Capillary refill takes less than 2 seconds. No rash noted. No erythema. No pallor.   Psychiatric: He has a normal mood and affect.   Vitals reviewed.      Significant Labs:  All pertinent labs from the last 24 hours have been reviewed.    Significant Diagnostics:  I have reviewed all pertinent imaging results/findings within the past 24 hours.    Assessment/Plan:     Open wound of left foot    -L foot wound multifactorial likely due to DM, PVD and venous insufficiency - rec angiography with possible intervention   -Rec wound care consult  -Offloading  -Abx per primary team  -Strict glycemic control  -NPO at midnight         Thank you for your consult. I will follow-up with patient. Please contact us if you have any additional questions.    Mitch Chan MD  Vascular Surgery  [facility] [place]

## 2018-11-08 NOTE — CONSULTS
Discussed wound care with outpatient [facility]. Will perform dressing changes every 8 hours to manage drainage.   Will assess left leg tomorrow.

## 2018-11-08 NOTE — NURSING
Patient arrived to room 300 by stretcher he was able to assist with transfer from stretcher to his assigned bed. Sister rhina at bedside. Patient denies pain at this time. Left food dressing in kerlix gauze and vaseline dressing to top and bottom of foot located on heel his foot is blistered and very reddened with deep vascular ulcers noted redressed tolerated well. Right leg is reddened and his right great toe is amputated according to patient was removed over 11 years ago foot swollen and reddened anterior part of lower leg is reddened and blistered both lower forelegs have scattered scabs noted. Assessment completed scoping sinus rhythm with first degree av block. Bed alarm on call light in reach head of bed up side rail up x 3 urinal and call light in reach.

## 2018-11-09 DIAGNOSIS — I10 ESSENTIAL HYPERTENSION: ICD-10-CM

## 2018-11-09 LAB
ALBUMIN SERPL BCP-MCNC: 2.3 G/DL
ALP SERPL-CCNC: 313 U/L
ALT SERPL W/O P-5'-P-CCNC: 26 U/L
ANION GAP SERPL CALC-SCNC: 5 MMOL/L
ANION GAP SERPL CALC-SCNC: 6 MMOL/L
ANION GAP SERPL CALC-SCNC: 6 MMOL/L
AST SERPL-CCNC: 23 U/L
BASOPHILS # BLD AUTO: 0.04 K/UL
BASOPHILS NFR BLD: 0.4 %
BILIRUB SERPL-MCNC: 0.8 MG/DL
BUN SERPL-MCNC: 46 MG/DL
BUN SERPL-MCNC: 46 MG/DL
BUN SERPL-MCNC: 49 MG/DL
CALCIUM SERPL-MCNC: 9 MG/DL
CALCIUM SERPL-MCNC: 9.3 MG/DL
CALCIUM SERPL-MCNC: 9.7 MG/DL
CHLORIDE SERPL-SCNC: 109 MMOL/L
CHLORIDE SERPL-SCNC: 110 MMOL/L
CHLORIDE SERPL-SCNC: 111 MMOL/L
CO2 SERPL-SCNC: 20 MMOL/L
CO2 SERPL-SCNC: 20 MMOL/L
CO2 SERPL-SCNC: 21 MMOL/L
CREAT SERPL-MCNC: 1.5 MG/DL
DIFFERENTIAL METHOD: ABNORMAL
EOSINOPHIL # BLD AUTO: 0.1 K/UL
EOSINOPHIL NFR BLD: 1.2 %
ERYTHROCYTE [DISTWIDTH] IN BLOOD BY AUTOMATED COUNT: 17.1 %
EST. GFR  (AFRICAN AMERICAN): 58 ML/MIN/1.73 M^2
EST. GFR  (NON AFRICAN AMERICAN): 50 ML/MIN/1.73 M^2
GLUCOSE SERPL-MCNC: 53 MG/DL
GLUCOSE SERPL-MCNC: 65 MG/DL
GLUCOSE SERPL-MCNC: 71 MG/DL
HCT VFR BLD AUTO: 31.3 %
HGB BLD-MCNC: 10.4 G/DL
LYMPHOCYTES # BLD AUTO: 0.7 K/UL
LYMPHOCYTES NFR BLD: 5.8 %
MCH RBC QN AUTO: 31 PG
MCHC RBC AUTO-ENTMCNC: 33.2 G/DL
MCV RBC AUTO: 93 FL
MONOCYTES # BLD AUTO: 1.2 K/UL
MONOCYTES NFR BLD: 10.9 %
NEUTROPHILS # BLD AUTO: 9.1 K/UL
NEUTROPHILS NFR BLD: 81.7 %
PLATELET # BLD AUTO: 409 K/UL
PMV BLD AUTO: 9 FL
POCT GLUCOSE: 105 MG/DL (ref 70–110)
POCT GLUCOSE: 131 MG/DL (ref 70–110)
POCT GLUCOSE: 63 MG/DL (ref 70–110)
POCT GLUCOSE: 82 MG/DL (ref 70–110)
POCT GLUCOSE: 95 MG/DL (ref 70–110)
POTASSIUM SERPL-SCNC: 5.8 MMOL/L
POTASSIUM SERPL-SCNC: 5.9 MMOL/L
POTASSIUM SERPL-SCNC: 6.4 MMOL/L
PROT SERPL-MCNC: 7.9 G/DL
RBC # BLD AUTO: 3.36 M/UL
SODIUM SERPL-SCNC: 135 MMOL/L
SODIUM SERPL-SCNC: 136 MMOL/L
SODIUM SERPL-SCNC: 137 MMOL/L
WBC # BLD AUTO: 11.12 K/UL

## 2018-11-09 PROCEDURE — 99233 SBSQ HOSP IP/OBS HIGH 50: CPT | Mod: ,,, | Performed by: SURGERY

## 2018-11-09 PROCEDURE — 94760 N-INVAS EAR/PLS OXIMETRY 1: CPT

## 2018-11-09 PROCEDURE — 85025 COMPLETE CBC W/AUTO DIFF WBC: CPT

## 2018-11-09 PROCEDURE — 21400001 HC TELEMETRY ROOM

## 2018-11-09 PROCEDURE — 94761 N-INVAS EAR/PLS OXIMETRY MLT: CPT

## 2018-11-09 PROCEDURE — 93005 ELECTROCARDIOGRAM TRACING: CPT

## 2018-11-09 PROCEDURE — 25000003 PHARM REV CODE 250: Performed by: HOSPITALIST

## 2018-11-09 PROCEDURE — 63600175 PHARM REV CODE 636 W HCPCS: Performed by: HOSPITALIST

## 2018-11-09 PROCEDURE — 80048 BASIC METABOLIC PNL TOTAL CA: CPT | Mod: 91

## 2018-11-09 PROCEDURE — 63600175 PHARM REV CODE 636 W HCPCS: Performed by: EMERGENCY MEDICINE

## 2018-11-09 PROCEDURE — 25000003 PHARM REV CODE 250: Performed by: EMERGENCY MEDICINE

## 2018-11-09 PROCEDURE — 93010 ELECTROCARDIOGRAM REPORT: CPT | Mod: ,,, | Performed by: INTERNAL MEDICINE

## 2018-11-09 PROCEDURE — 36415 COLL VENOUS BLD VENIPUNCTURE: CPT

## 2018-11-09 PROCEDURE — 80053 COMPREHEN METABOLIC PANEL: CPT

## 2018-11-09 RX ORDER — FUROSEMIDE 10 MG/ML
20 INJECTION INTRAMUSCULAR; INTRAVENOUS ONCE
Status: COMPLETED | OUTPATIENT
Start: 2018-11-09 | End: 2018-11-09

## 2018-11-09 RX ADMIN — STANDARDIZED SENNA CONCENTRATE AND DOCUSATE SODIUM 1 TABLET: 8.6; 5 TABLET, FILM COATED ORAL at 09:11

## 2018-11-09 RX ADMIN — AMLODIPINE BESYLATE 5 MG: 5 TABLET ORAL at 03:11

## 2018-11-09 RX ADMIN — CARVEDILOL 6.25 MG: 6.25 TABLET, FILM COATED ORAL at 03:11

## 2018-11-09 RX ADMIN — OXYCODONE HYDROCHLORIDE AND ACETAMINOPHEN 1 TABLET: 7.5; 325 TABLET ORAL at 12:11

## 2018-11-09 RX ADMIN — FUROSEMIDE 20 MG: 20 TABLET ORAL at 03:11

## 2018-11-09 RX ADMIN — PREDNISOLONE ACETATE 1 DROP: 10 SUSPENSION OPHTHALMIC at 02:11

## 2018-11-09 RX ADMIN — ALLOPURINOL 200 MG: 100 TABLET ORAL at 03:11

## 2018-11-09 RX ADMIN — SODIUM POLYSTYRENE SULFONATE 30 G: 15 SUSPENSION ORAL; RECTAL at 10:11

## 2018-11-09 RX ADMIN — KETOROLAC TROMETHAMINE 1 DROP: 5 SOLUTION/ DROPS OPHTHALMIC at 02:11

## 2018-11-09 RX ADMIN — EZETIMIBE 10 MG: 10 TABLET ORAL at 03:11

## 2018-11-09 RX ADMIN — PREDNISOLONE ACETATE 1 DROP: 10 SUSPENSION OPHTHALMIC at 09:11

## 2018-11-09 RX ADMIN — OXYCODONE HYDROCHLORIDE AND ACETAMINOPHEN 1 TABLET: 7.5; 325 TABLET ORAL at 02:11

## 2018-11-09 RX ADMIN — OXYCODONE HYDROCHLORIDE AND ACETAMINOPHEN 1 TABLET: 7.5; 325 TABLET ORAL at 08:11

## 2018-11-09 RX ADMIN — CEFTRIAXONE 1 G: 1 INJECTION, SOLUTION INTRAVENOUS at 01:11

## 2018-11-09 RX ADMIN — CALCIUM GLUCONATE 500 MG: 98 INJECTION, SOLUTION INTRAVENOUS at 03:11

## 2018-11-09 RX ADMIN — DORZOLAMIDE HYDROCHLORIDE AND TIMOLOL MALEATE 1 DROP: 20; 5 SOLUTION/ DROPS OPHTHALMIC at 09:11

## 2018-11-09 RX ADMIN — BRIMONIDINE TARTRATE 1 DROP: 1 SOLUTION/ DROPS OPHTHALMIC at 02:11

## 2018-11-09 RX ADMIN — CARVEDILOL 6.25 MG: 6.25 TABLET, FILM COATED ORAL at 09:11

## 2018-11-09 RX ADMIN — OXYCODONE HYDROCHLORIDE AND ACETAMINOPHEN 1 TABLET: 7.5; 325 TABLET ORAL at 10:11

## 2018-11-09 RX ADMIN — FUROSEMIDE 20 MG: 10 INJECTION, SOLUTION INTRAMUSCULAR; INTRAVENOUS at 03:11

## 2018-11-09 RX ADMIN — DEXTROSE MONOHYDRATE 12.5 G: 25 INJECTION, SOLUTION INTRAVENOUS at 08:11

## 2018-11-09 RX ADMIN — BRIMONIDINE TARTRATE 1 DROP: 1 SOLUTION/ DROPS OPHTHALMIC at 08:11

## 2018-11-09 RX ADMIN — KETOROLAC TROMETHAMINE 1 DROP: 5 SOLUTION/ DROPS OPHTHALMIC at 09:11

## 2018-11-09 RX ADMIN — ASPIRIN 81 MG 81 MG: 81 TABLET ORAL at 03:11

## 2018-11-09 RX ADMIN — ENOXAPARIN SODIUM 40 MG: 100 INJECTION SUBCUTANEOUS at 03:11

## 2018-11-09 RX ADMIN — DORZOLAMIDE HYDROCHLORIDE AND TIMOLOL MALEATE 1 DROP: 20; 5 SOLUTION/ DROPS OPHTHALMIC at 08:11

## 2018-11-09 RX ADMIN — SODIUM CHLORIDE: 0.9 INJECTION, SOLUTION INTRAVENOUS at 01:11

## 2018-11-09 RX ADMIN — BRIMONIDINE TARTRATE 1 DROP: 1 SOLUTION/ DROPS OPHTHALMIC at 09:11

## 2018-11-09 NOTE — SUBJECTIVE & OBJECTIVE
Medications:  Continuous Infusions:   sodium chloride 0.9% 50 mL/hr at 11/08/18 1844     Scheduled Meds:   allopurinol  200 mg Oral Daily    amLODIPine  5 mg Oral Daily    aspirin  81 mg Oral Daily    brimonidine 0.1%  1 drop Both Eyes TID    carvedilol  6.25 mg Oral BID    cefTRIAXone (ROCEPHIN) IVPB  1 g Intravenous Q12H    dorzolamide-timolol 2-0.5%  1 drop Both Eyes BID    enoxaparin  40 mg Subcutaneous Daily    ezetimibe  10 mg Oral Daily    furosemide  20 mg Oral Daily    ketorolac 0.5%  1 drop Right Eye TID    prednisoLONE acetate  1 drop Right Eye TID    senna-docusate 8.6-50 mg  1 tablet Oral BID     PRN Meds:acetaminophen, dextrose 50%, dextrose 50%, glucagon (human recombinant), glucose, glucose, influenza, insulin aspart U-100, oxyCODONE-acetaminophen     Objective:     Vital Signs (Most Recent):  Temp: 99 °F (37.2 °C) (11/09/18 1157)  Pulse: 82 (11/09/18 1157)  Resp: 18 (11/09/18 1157)  BP: (!) 126/59 (11/09/18 1157)  SpO2: (!) 94 % (11/09/18 1157) Vital Signs (24h Range):  Temp:  [97.9 °F (36.6 °C)-99 °F (37.2 °C)] 99 °F (37.2 °C)  Pulse:  [66-84] 82  Resp:  [17-21] 18  SpO2:  [91 %-98 %] 94 %  BP: (123-143)/(59-83) 126/59     Date 11/09/18 0700 - 11/10/18 0659   Shift 2140-6974 4516-8687 2592-3993 24 Hour Total   INTAKE   P.O. 120   120   I.V.(mL/kg) 813.3(8.4)   813.3(8.4)   Shift Total(mL/kg) 933.3(9.6)   933.3(9.6)   OUTPUT   Shift Total(mL/kg)       Weight (kg) 97.3 97.3 97.3 97.3       Physical Exam   Constitutional: He is oriented to person, place, and time. He appears well-developed and well-nourished. No distress.   HENT:   Head: Normocephalic and atraumatic.   Eyes: Conjunctivae are normal.   Neck: Neck supple.   Cardiovascular: Normal rate.   Pulses:       Femoral pulses are 2+ on the right side, and 2+ on the left side.       Dorsalis pedis pulses are Detected w/ doppler on the right side, and Detected w/ doppler on the left side.        Posterior tibial pulses are Detected  w/ doppler on the right side, and Detected w/ doppler on the left side.   Pulmonary/Chest: Effort normal. No respiratory distress. He exhibits no tenderness.   Abdominal: Soft. He exhibits no distension and no mass. There is no tenderness. There is no rebound and no guarding.   Musculoskeletal: Normal range of motion. He exhibits edema. He exhibits no tenderness or deformity.   Feet:   Right Foot:   Skin Integrity: Negative for ulcer, blister, skin breakdown, callus or dry skin.   Left Foot:   Skin Integrity: Positive for ulcer, blister, skin breakdown, callus and dry skin.   Neurological: He is alert and oriented to person, place, and time. No sensory deficit.   Skin: Skin is warm and dry. Capillary refill takes less than 2 seconds. No rash noted. No erythema. No pallor.   Psychiatric: He has a normal mood and affect.   Vitals reviewed.      Significant Labs:  All pertinent labs from the last 24 hours have been reviewed.    Significant Diagnostics:  I have reviewed all pertinent imaging results/findings within the past 24 hours.

## 2018-11-09 NOTE — PROGRESS NOTES
[facility]  Vascular Surgery  Progress Note    Patient Name: Marvin Ray  MRN: [id number]  Admission Date: 11/8/2018  Primary Care Provider: Rubén Davis MD    Subjective:     Interval History: No new complaints.  Hyperkalemia on AM labs.    Post-Op Info:  Procedure(s) (LRB):  AORTOGRAM, WITH SERIALOGRAPHY, LEFT LOWER EXTREMITY, POSSIBLE INTERVENTION (Left)           Medications:  Continuous Infusions:   sodium chloride 0.9% 50 mL/hr at 11/08/18 1844     Scheduled Meds:   allopurinol  200 mg Oral Daily    amLODIPine  5 mg Oral Daily    aspirin  81 mg Oral Daily    brimonidine 0.1%  1 drop Both Eyes TID    carvedilol  6.25 mg Oral BID    cefTRIAXone (ROCEPHIN) IVPB  1 g Intravenous Q12H    dorzolamide-timolol 2-0.5%  1 drop Both Eyes BID    enoxaparin  40 mg Subcutaneous Daily    ezetimibe  10 mg Oral Daily    furosemide  20 mg Oral Daily    ketorolac 0.5%  1 drop Right Eye TID    prednisoLONE acetate  1 drop Right Eye TID    senna-docusate 8.6-50 mg  1 tablet Oral BID     PRN Meds:acetaminophen, dextrose 50%, dextrose 50%, glucagon (human recombinant), glucose, glucose, influenza, insulin aspart U-100, oxyCODONE-acetaminophen     Objective:     Vital Signs (Most Recent):  Temp: 99 °F (37.2 °C) (11/09/18 1157)  Pulse: 82 (11/09/18 1157)  Resp: 18 (11/09/18 1157)  BP: (!) 126/59 (11/09/18 1157)  SpO2: (!) 94 % (11/09/18 1157) Vital Signs (24h Range):  Temp:  [97.9 °F (36.6 °C)-99 °F (37.2 °C)] 99 °F (37.2 °C)  Pulse:  [66-84] 82  Resp:  [17-21] 18  SpO2:  [91 %-98 %] 94 %  BP: (123-143)/(59-83) 126/59     Date 11/09/18 0700 - 11/10/18 0659   Shift [id number] 24 Hour Total   INTAKE   P.O. 120   120   I.V.(mL/kg) 813.3(8.4)   813.3(8.4)   Shift Total(mL/kg) 933.3(9.6)   933.3(9.6)   OUTPUT   Shift Total(mL/kg)       Weight (kg) 97.3 97.3 97.3 97.3       Physical Exam   Constitutional: He is oriented to person, place, and time. He appears well-developed and  well-nourished. No distress.   HENT:   Head: Normocephalic and atraumatic.   Eyes: Conjunctivae are normal.   Neck: Neck supple.   Cardiovascular: Normal rate.   Pulses:       Femoral pulses are 2+ on the right side, and 2+ on the left side.       Dorsalis pedis pulses are Detected w/ doppler on the right side, and Detected w/ doppler on the left side.        Posterior tibial pulses are Detected w/ doppler on the right side, and Detected w/ doppler on the left side.   Pulmonary/Chest: Effort normal. No respiratory distress. He exhibits no tenderness.   Abdominal: Soft. He exhibits no distension and no mass. There is no tenderness. There is no rebound and no guarding.   Musculoskeletal: Normal range of motion. He exhibits edema. He exhibits no tenderness or deformity.   Feet:   Right Foot:   Skin Integrity: Negative for ulcer, blister, skin breakdown, callus or dry skin.   Left Foot:   Skin Integrity: Positive for ulcer, blister, skin breakdown, callus and dry skin.   Neurological: He is alert and oriented to person, place, and time. No sensory deficit.   Skin: Skin is warm and dry. Capillary refill takes less than 2 seconds. No rash noted. No erythema. No pallor.   Psychiatric: He has a normal mood and affect.   Vitals reviewed.      Significant Labs:  All pertinent labs from the last 24 hours have been reviewed.    Significant Diagnostics:  I have reviewed all pertinent imaging results/findings within the past 24 hours.    Assessment/Plan:     Open wound of left foot    -L foot wound multifactorial likely due to DM, PVD and venous insufficiency - rec angiography with possible intervention; postponed case due to hyperkalemia and LAYNE, treating today and repeating BMP after, will reassess at that time  -Rec wound care consult  -Offloading  -Abx per primary team  -Strict glycemic control  -NPO          Mitch Chan MD  Vascular Surgery  [facility]

## 2018-11-09 NOTE — NURSING
Bedside rounding report given to santiago damico rn on patient progress and updated handoff report sheet given to her no acute events or changes.

## 2018-11-09 NOTE — ASSESSMENT & PLAN NOTE
No acute issues  Hold aldactone and acei insetting of hyperkalemia and LAYNE   Resume BB  S/p ICD 11/2

## 2018-11-09 NOTE — ASSESSMENT & PLAN NOTE
Secondary to LAYNE and bactrim/aldactone/acei - holding agents  kayexlate given in ED  Bmp in am

## 2018-11-09 NOTE — NURSING
kaexalate given po will monitor bowel movents and times basic chemistry level ordered for 1300 today . Patient remains npo for now and plan for vascular surgery will proceed if potassium level decreases . Spoke with dr coelho she is also aware of dr monsivais plan of care today . No acute changes on telemetry , medicated for pain left foot. Dressing clean and dry .sister rhina at bedside too. Side rail up x 3 call light in reach head of bed up bed alarm on.

## 2018-11-09 NOTE — NURSING
1230- npo status remains . labwork pending . Patient resting in bed has had two stools . No changes on telemetry . Call light and urinal  Within reach. Sister at bedside. Very supportive.     1455- dr wing saw patient at bedside . Angiogram canceled today per dr wing and did call interventional radilology and spoke with verito . Potassium level elevated new order for 12 lead ekg . No changes on telemetry no chest pain tolerating iv fluids. Removed dressing to left foot per md order so that wound care nurse dominick esquivel needs to see. Dressing removed .       1649- spoke with dominick esquivel rn wound care nurse plan to see patient this evening. Dressing remains off. Calcium gluconate infusing. Had another bowel movement too loose formed. No changes on telemetry .         1715- no acute distress wound care nurse assessing patients left and right legs and she is redressing the left foot. Sister at bedside patient ate all of his supper meal. No changes on telemetry . Denies needs call light and urinal within reach.

## 2018-11-09 NOTE — NURSING
Bedside rounding report given to santiago damico rn on patients progress and updated handoff report sheet given to her. Assessment completed and plan of care updated on board and reviewed with patient. Nurse santiago reports patient has been clipped and bathed npo status maintained since midnight . Call light and head of bed up side rail up x 3 . Bed alarm on . Denies pain or needs right now.

## 2018-11-09 NOTE — H&P
[facility] Hospital Medicine  History & Physical    Patient Name: Marvin Ray  MRN: [id number]  Admission Date: 11/8/2018  Attending Physician: Ludivina Rodríguez MD   Primary Care Provider: Rubén Davis MD         Patient information was obtained from patient, relative(s) and ER records.     Subjective:     Principal Problem:Cellulitis    Chief Complaint:   Chief Complaint   Patient presents with    Leg Swelling     Left , stated sent by dr Peraza for evaluation of circulation problems , poor flow.         HPI: 59 yo male with CAD, HLP, hepatosplenomegaly/ascites, DM2 with CKD3 and retinopathy, PVD, gout, chronic combined CHF and chronic BLE wounds L>R presented from Dr. Chan office with concern for nonhealing wound to LLE. He was recently given bactrim and presented today with LAYNE, hyperkalemia and increased pain and edema to LLE. Pt has h/o Group G strep bacteremia (10/10). ICD implanted 11/2.  The patient's sister reports doing dressing changes of LE wounds 5 times a day. Noticeable weeping of fluid from the LE. Pt made NPO by vascular surgery and plan for angiogram and possible stent placement to LE to assist with better LE wound healing.     Past Medical History:   Diagnosis Date    Arthritis     Cellulitis     CKD (chronic kidney disease), stage III     Coronary artery disease     Diabetes mellitus     Diabetic retinopathy     Diabetic ulcer of left foot     Glaucoma     Gout     Hyperlipemia     Hypertension     ICD (implantable cardioverter-defibrillator) in place 11/02/2018    Left chest    Non-pressure chronic ulcer of other part of left foot with fat layer exposed 10/23/2018    PVD (peripheral vascular disease)     Type 2 diabetes mellitus with left diabetic foot ulcer 10/29/2018    Unsteady gait     uses a wheelchair       Past Surgical History:   Procedure Laterality Date    AHMED GLAUCOMA IMPLANT Left 2011    DONE AT [facility] GLAUCOMA  IMPLANT Left 2012    WITH CATARACT EXTRACTION//DONE AT [facility]    CARDIAC CATHETERIZATION Left 05/2016    CARDIAC DEFIBRILLATOR PLACEMENT Left 11/02/2018    CATARACT EXTRACTION W/  INTRAOCULAR LENS IMPLANT Left 2012    WITH BAERVEDT//DONE AT [facility]    CATARACT EXTRACTION W/  INTRAOCULAR LENS IMPLANT Right 09/26/2018    COMPLEX ()    CB DESTRUCTION WITH CYCLO G6 Left 02/15/2017        CYST REMOVAL      HEART CATH-LEFT N/A 5/6/2016    Performed by Mike Magana MD at [facility] CATH LAB    INSERTION, ICD GENERATOR, SINGLE CHAMBER N/A 11/2/2018    Performed by Pernell Greer [facility] at [facility] CATH LAB    INSERTION, IOL PROSTHESIS Right 9/26/2018    Performed by Perla Cortés MD at [facility] OR [address] [place] [address]    INTRAOCULAR PROSTHESES INSERTION Right 9/26/2018    Procedure: INSERTION, IOL PROSTHESIS;  Surgeon: Perla Cortés MD;  Location: [facility] OR [address] [place] [address];  Service: Ophthalmology;  Laterality: Right;    PHACOEMULSIFICATION OF CATARACT Right 9/26/2018    Procedure: PHACOEMULSIFICATION, CATARACT;  Surgeon: Perla Cortés MD;  Location: [facility] OR [address] [place] [address];  Service: Ophthalmology;  Laterality: Right;    PHACOEMULSIFICATION, CATARACT Right 9/26/2018    Performed by Perla Cortés MD at [facility] OR [address] [place] [address]    Right foot surgery  10/2014    TOE AMPUTATION Right     first and second    TONSILLECTOMY      TRABECULECTOMY/G 6 LASER Left 2/15/2017    Performed by Perla Cortés MD at [facility] OR [address] [place] [address]       Review of patient's allergies indicates:   Allergen Reactions    Statins-hmg-coa reductase inhibitors      Generalized Pain    Onglyza [saxagliptin]     Penicillins Rash       No current facility-administered medications on file prior to encounter.      Current Outpatient Medications on File Prior to Encounter   Medication Sig    allopurinol (ZYLOPRIM) 100 MG tablet Take 2 tablets (200 mg total) by mouth once daily.    amLODIPine (NORVASC) 5 MG tablet TAKE 1 TABLET(5 MG) BY  "MOUTH EVERY DAY    aspirin 81 MG Chew Take 81 mg by mouth once daily.    benazepril (LOTENSIN) 40 MG tablet TAKE 1 TABLET(40 MG) BY MOUTH TWICE DAILY    brimonidine 0.1% (ALPHAGAN P) 0.1 % Drop Place 1 drop into both eyes 3 (three) times daily.    carvedilol (COREG) 6.25 MG tablet Take 1 tablet (6.25 mg total) by mouth 2 (two) times daily.    coenzyme Q10 100 mg capsule Take 100 mg by mouth every morning.    dorzolamide-timolol 2-0.5% (COSOPT) 22.3-6.8 mg/mL ophthalmic solution Place 1 drop into both eyes 3 (three) times daily.    ezetimibe (ZETIA) 10 mg tablet Take 1 tablet (10 mg total) by mouth once daily.    fish oil-omega-3 fatty acids 300-1,000 mg capsule Take 2 capsules (2 g total) by mouth 2 (two) times daily. (Patient taking differently: Take 1 g by mouth 2 (two) times daily. )    furosemide (LASIX) 20 MG tablet Take 1 tablet (20 mg total) by mouth once daily.    insulin glargine-lixisenatide (SOLIQUA 100/33) 100 unit-33 mcg/mL InPn Inject 34 units once daily    ketorolac 0.5% (ACULAR) 0.5 % Drop Place 1 drop into the right eye 3 (three) times daily.    MULTIVIT,THER IRON,CA,FA & MIN (MULTIVITAMIN) Tab Take 1 tablet by mouth every morning.     pen needle, diabetic 31 gauge x 1/4" Ndle Use as directed    prednisoLONE acetate (PRED FORTE) 1 % DrpS Place 1 drop into the right eye 3 (three) times daily.    spironolactone (ALDACTONE) 100 MG tablet Take 1 tablet (100 mg total) by mouth once daily.    sulfamethoxazole-trimethoprim 800-160mg (BACTRIM DS) 800-160 mg Tab Take 1 tablet by mouth 2 (two) times daily. for 10 days     Family History     Problem Relation (Age of Onset)    Diabetes Mother    Heart disease Brother    No Known Problems Father, Sister, Maternal Aunt, Maternal Uncle, Paternal Aunt, Paternal Uncle, Maternal Grandmother, Maternal Grandfather, Paternal Grandmother, Paternal Grandfather        Tobacco Use    Smoking status: Former Smoker     Packs/day: 1.00     Years: 3.00     Pack " years: 3.00     Last attempt to quit: 1984     Years since quittin.3    Smokeless tobacco: Never Used   Substance and Sexual Activity    Alcohol use: Yes     Alcohol/week: 0.6 oz     Types: 1 Cans of beer per week     Comment: Occasional    Drug use: No    Sexual activity: Not Currently     Review of Systems   Constitutional: Negative for chills and fever.   HENT: Negative for congestion.    Eyes: Negative for visual disturbance.   Respiratory: Negative for shortness of breath.    Cardiovascular: Negative for chest pain.   Gastrointestinal: Negative for abdominal pain.   Endocrine: Negative for polyphagia.   Genitourinary: Negative for dysuria.   Musculoskeletal: Negative for back pain.   Skin: Positive for color change and wound. Negative for pallor and rash.   Allergic/Immunologic: Negative for immunocompromised state.   Neurological: Negative for weakness.   Hematological: Does not bruise/bleed easily.   Psychiatric/Behavioral: Negative for confusion.     Objective:     Vital Signs (Most Recent):  Temp: 97.9 °F (36.6 °C) (18 1515)  Pulse: 75 (18 1515)  Resp: 18 (18 1515)  BP: 128/60 (18 1515)  SpO2: 98 % (18 1515) Vital Signs (24h Range):  Temp:  [97.9 °F (36.6 °C)-98.3 °F (36.8 °C)] 97.9 °F (36.6 °C)  Pulse:  [67-78] 75  Resp:  [16-21] 18  SpO2:  [96 %-98 %] 98 %  BP: (116-129)/(57-67) 128/60     Weight: (213.2 pounds)  Body mass index is 31.28 kg/m².    Physical Exam   Constitutional: He is oriented to person, place, and time. He appears well-developed and well-nourished. No distress.   HENT:   Head: Normocephalic and atraumatic.   Eyes: Conjunctivae are normal.   Neck: Neck supple.   Cardiovascular: Normal rate.   Pulses:       Femoral pulses are 2+ on the right side, and 2+ on the left side.       Dorsalis pedis pulses are Detected w/ doppler on the right side, and Detected w/ doppler on the left side.        Posterior tibial pulses are Detected w/ doppler on the  right side, and Detected w/ doppler on the left side.   Pulmonary/Chest: Effort normal. No respiratory distress. He exhibits no tenderness.   Abdominal: Soft. He exhibits no distension and no mass. There is no tenderness. There is no rebound and no guarding.   Musculoskeletal: Normal range of motion. He exhibits edema. He exhibits no tenderness or deformity.   Feet:   Right Foot:   Skin Integrity: Negative for ulcer, blister, skin breakdown, callus or dry skin.   Left Foot:   Skin Integrity: Positive for ulcer, blister, skin breakdown, callus and dry skin.   Neurological: He is alert and oriented to person, place, and time. No sensory deficit.   Skin: Skin is warm and dry. Capillary refill takes less than 2 seconds. No rash noted. No erythema. No pallor.   Psychiatric: He has a normal mood and affect.   Vitals reviewed.          Significant Labs:   A1C:   Recent Labs   Lab 07/27/18  0820 10/30/18  0955   HGBA1C >14.0* 8.3*     Blood Culture: No results for input(s): LABBLOO in the last 48 hours.  CBC:   Recent Labs   Lab 11/08/18  1041   WBC 10.13   HGB 11.0*   HCT 32.3*   *     CMP:   Recent Labs   Lab 11/08/18  1154 11/08/18  1541   * 134*   K 6.1* 5.9*    109   CO2 16* 20*   GLU 77 70   BUN 55* 53*   CREATININE 1.6* 1.5*   CALCIUM 9.3 9.1   PROT 8.1  --    ALBUMIN 2.4*  --    BILITOT 0.8  --    ALKPHOS 337*  --    AST 24  --    ALT 33  --    ANIONGAP 7* 5*   EGFRNONAA 46* 50*     Cardiac Markers: No results for input(s): CKMB, MYOGLOBIN, BNP, TROPISTAT in the last 48 hours.  Coagulation:   Recent Labs   Lab 11/08/18  1154   INR 1.1   APTT 27.5     Lactic Acid:   Recent Labs   Lab 11/08/18  1041   LACTATE 1.3     Magnesium: No results for input(s): MG in the last 48 hours.  POCT Glucose:   Recent Labs   Lab 11/08/18  1247 11/08/18  1541 11/08/18  1654   POCTGLUCOSE 78 74 76     Troponin: No results for input(s): TROPONINI in the last 48 hours.  Urine Studies: No results for input(s): COLORU,  APPEARANCEUA, PHUR, SPECGRAV, PROTEINUA, GLUCUA, KETONESU, BILIRUBINUA, OCCULTUA, NITRITE, UROBILINOGEN, LEUKOCYTESUR, RBCUA, WBCUA, BACTERIA, SQUAMEPITHEL, HYALINECASTS in the last 48 hours.    Invalid input(s): NOEMI    Significant Imaging: reviewed last 24 hours       Assessment/Plan:     * Cellulitis    LLE >R; secondary to PVD  Repeat blood cultures  Rocephin IV Q12 based on last blood culture +GroupG strep  Vascular surgery consulted - see PVD  Pain control  Wound care consulted          Hyperkalemia    Secondary to LAYNE and bactrim/aldactone/acei - holding agents  kayexlate given in ED  Bmp in am        Metabolic acidosis    Secondary to LAYNE  Bicarb given in ED  Bmp in am        Acute kidney injury    Pt has reported baseline CKD3  Recently placed on bactrim in addition to acei and aldactone/lasix - hold offending agents  Gentle IVF while NPO   BMP in am  Renal dose antibiotics  Avoid nephrotoxins        Open wound of left foot    Wound care consulted  See above        Chronic combined systolic and diastolic heart failure    No acute issues  Hold aldactone and acei insetting of hyperkalemia and LAYNE   Resume BB  S/p ICD 11/2       Hepatosplenomegaly    Recent paracentesis - negative culture   Monitor CMP             PVD (peripheral vascular disease)    NPo after MN  Angiogram in am  Vascular surgery consulted  Pain control        CAD (coronary artery disease)    No acute issues  Resume aspirin and zetia  Statin intolerance         Essential hypertension      Controlled  Resume amlodipine and BB  Hold aldactone and acei as stated above      DM type 2 with diabetic peripheral neuropathy    Uncontrolled, A1c 8.3%  Basal -bolus insulin   Diabetic diet             VTE Risk Mitigation (From admission, onward)        Ordered     enoxaparin injection 40 mg  Daily      11/08/18 1501     IP VTE HIGH RISK PATIENT  Once      11/08/18 1501             Ludivina Rodríguez MD  Department of Hospital Medicine   [facility] [place]

## 2018-11-09 NOTE — ASSESSMENT & PLAN NOTE
-L foot wound multifactorial likely due to DM, PVD and venous insufficiency - rec angiography with possible intervention; postponed case due to hyperkalemia and LAYNE, treating today and repeating BMP after, will reassess at that time  -Rec wound care consult  -Offloading  -Abx per primary team  -Strict glycemic control  -NPO

## 2018-11-09 NOTE — CONSULTS
A 58 year old man readmitted for cellulitis; hyperkalemia; metabolic acidosis; acute kidney injury; open wound of left foot; chronic combined systolic and diastolic heart failure; hepatosplenomegaly; PVD; CAD; HTN; DM II with diabetic peripheral neuropathy  11/9 WBC 11.12 Hgb 10.4 Hct 31.3 Alb 2.3 Potassium 6.4  Vascular procedure delayed due to hyperkalemia  Nursing performing dressing changes to left foot every 8 hours to manage moisture  Assessment:  Photodocumentation    Left dorsal foot- Darkened/drying toes extending to mid dorsal foot. No drainage seen from dorsal area    Left medial foot- Dry open wound involving medial foot extending to plantar foot.    Left plantar toes- Toes darkened and moist at base of toes.   Doesn't report increased pain with leg elevated.   Treatment:  Painted wounds with betadine. Placed Aquacel Ag jacqui between toes. Covered foot wounds with ABD pads. Covered ABDs with dry gauze and wrapped with Kerlix roll.   Nursing to continue local wound care to manage moisture and provide comfort.

## 2018-11-09 NOTE — PLAN OF CARE
"TN met with patient and patient's sisters at bedside to complete discharge needs assessment. TN explained duties of case management to patient.  TN reviewed   "[facility]", "Discharge Planning Begins on Admission" and discussed "Help at Home".  Patient stated he lives with his sister Chloé whom assists him at home if needed. Patient plans to return home and resume services with [facility] when ready for discharge. TN also discussed his responsibilities to manage his health at home. Patient was informed to leave folder at bedside during hospital stay. Contact information added to white board.    Discharge Needs- Disability Application, Resume services with [facility]  (Nurse/ PT/OT)    Patient Preferred Pharmacy:   [facility] [address] [place] [address] AT [address] [address]  Phone: [phone] Fax: [phone]      Appointment Time Preference: afternoon     11/09/18 1004   Discharge Assessment   Assessment Type Discharge Planning Assessment   Confirmed/corrected address and phone number on facesheet? Yes   Assessment information obtained from? Patient   Prior to hospitilization cognitive status: Alert/Oriented   Prior to hospitalization functional status: Independent;Assistive Equipment;Needs Assistance   Current cognitive status: Alert/Oriented   Current Functional Status: Independent;Assistive Equipment;Needs Assistance  (using wheelchair)   Lives With sibling(s)   Able to Return to Prior Arrangements yes   Is patient able to care for self after discharge? Yes   Who are your caregiver(s) and their phone number(s)? Chloé- sister @ [phone]   Patient's perception of discharge disposition home or selfcare;home health  ([place] )   Readmission Within The Last 30 Days current reason for admission unrelated to previous admission   Patient currently being followed by outpatient case management? No   Name and contact number of " agency or person providing outside services Home health    Equipment Currently Used at Home wheelchair;walker, rolling;bedside commode;bath bench;cane, straight   Do you have any problems affording any of your prescribed medications? Yes   If yes, what medications? (eye medications)   Is the patient taking medications as prescribed? yes   Does the patient have transportation home? Yes   Transportation Available car;family or friend will provide   Does the patient receive services at [facility]? No   Discharge Plan A Home;Home with family;Home Health   Discharge Plan B Home;Home with family;Home Health   Patient/Family In Agreement With Plan yes   Readmission Questionnaire   At the time of your discharge, did someone talk to you about what your health problems were? Yes   At the time of discharge, did someone talk to you about what to watch out for regarding worsening of your health problem? Yes   At the time of discharge, did someone talk to you about what to do if you experienced worsening of your health problem? Yes   At the time of discharge, did someone talk to you about which medication to take when you left the hospital and which ones to stop taking? Yes   At the time of discharge, did someone talk to you about when and where to follow up with a doctor after you left the hospital? Yes   Are you currently feeling confused? No   Are you currently having problems thinking? No   Are you currently having memory problems? No   Have you felt down, depressed, or hopeless? 1   Have you felt little interest or pleasure in doing things? 1   In the last 7 days, my sleep quality was: poor

## 2018-11-09 NOTE — NURSING
Patient remains npo for now unless cancelled per dr wing . I spoke with him he wants to cancel procedure for now and treat the potassium and recheck level . Will notifiy dr coelho .

## 2018-11-10 LAB
ALBUMIN SERPL BCP-MCNC: 2.2 G/DL
ALP SERPL-CCNC: 308 U/L
ALT SERPL W/O P-5'-P-CCNC: 23 U/L
ANION GAP SERPL CALC-SCNC: 5 MMOL/L
ANION GAP SERPL CALC-SCNC: 6 MMOL/L
AST SERPL-CCNC: 21 U/L
BILIRUB SERPL-MCNC: 0.7 MG/DL
BUN SERPL-MCNC: 41 MG/DL
BUN SERPL-MCNC: 44 MG/DL
CALCIUM SERPL-MCNC: 8.9 MG/DL
CALCIUM SERPL-MCNC: 9 MG/DL
CHLORIDE SERPL-SCNC: 108 MMOL/L
CHLORIDE SERPL-SCNC: 110 MMOL/L
CO2 SERPL-SCNC: 19 MMOL/L
CO2 SERPL-SCNC: 20 MMOL/L
CREAT SERPL-MCNC: 1.3 MG/DL
CREAT SERPL-MCNC: 1.4 MG/DL
EOSINOPHIL URNS QL WRIGHT STN: NORMAL
EST. GFR  (AFRICAN AMERICAN): >60 ML/MIN/1.73 M^2
EST. GFR  (AFRICAN AMERICAN): >60 ML/MIN/1.73 M^2
EST. GFR  (NON AFRICAN AMERICAN): 54 ML/MIN/1.73 M^2
EST. GFR  (NON AFRICAN AMERICAN): 59 ML/MIN/1.73 M^2
GLUCOSE SERPL-MCNC: 149 MG/DL
GLUCOSE SERPL-MCNC: 91 MG/DL
POCT GLUCOSE: 102 MG/DL (ref 70–110)
POCT GLUCOSE: 155 MG/DL (ref 70–110)
POCT GLUCOSE: 175 MG/DL (ref 70–110)
POCT GLUCOSE: 175 MG/DL (ref 70–110)
POTASSIUM SERPL-SCNC: 5.6 MMOL/L
POTASSIUM SERPL-SCNC: 5.8 MMOL/L
POTASSIUM UR-SCNC: 30 MMOL/L
PROT SERPL-MCNC: 7.5 G/DL
SODIUM SERPL-SCNC: 133 MMOL/L
SODIUM SERPL-SCNC: 135 MMOL/L

## 2018-11-10 PROCEDURE — 87205 SMEAR GRAM STAIN: CPT

## 2018-11-10 PROCEDURE — 99233 SBSQ HOSP IP/OBS HIGH 50: CPT | Mod: ,,, | Performed by: SURGERY

## 2018-11-10 PROCEDURE — 80048 BASIC METABOLIC PNL TOTAL CA: CPT

## 2018-11-10 PROCEDURE — 36415 COLL VENOUS BLD VENIPUNCTURE: CPT

## 2018-11-10 PROCEDURE — 21400001 HC TELEMETRY ROOM

## 2018-11-10 PROCEDURE — 94761 N-INVAS EAR/PLS OXIMETRY MLT: CPT

## 2018-11-10 PROCEDURE — 25000003 PHARM REV CODE 250: Performed by: EMERGENCY MEDICINE

## 2018-11-10 PROCEDURE — 63600175 PHARM REV CODE 636 W HCPCS: Performed by: HOSPITALIST

## 2018-11-10 PROCEDURE — 80053 COMPREHEN METABOLIC PANEL: CPT

## 2018-11-10 PROCEDURE — 84133 ASSAY OF URINE POTASSIUM: CPT

## 2018-11-10 PROCEDURE — 63600175 PHARM REV CODE 636 W HCPCS: Performed by: EMERGENCY MEDICINE

## 2018-11-10 PROCEDURE — 25000003 PHARM REV CODE 250: Performed by: HOSPITALIST

## 2018-11-10 RX ADMIN — BRIMONIDINE TARTRATE 1 DROP: 1 SOLUTION/ DROPS OPHTHALMIC at 04:11

## 2018-11-10 RX ADMIN — FUROSEMIDE 20 MG: 20 TABLET ORAL at 08:11

## 2018-11-10 RX ADMIN — ALLOPURINOL 200 MG: 100 TABLET ORAL at 08:11

## 2018-11-10 RX ADMIN — BRIMONIDINE TARTRATE 1 DROP: 1 SOLUTION/ DROPS OPHTHALMIC at 08:11

## 2018-11-10 RX ADMIN — DORZOLAMIDE HYDROCHLORIDE AND TIMOLOL MALEATE 1 DROP: 20; 5 SOLUTION/ DROPS OPHTHALMIC at 08:11

## 2018-11-10 RX ADMIN — EZETIMIBE 10 MG: 10 TABLET ORAL at 08:11

## 2018-11-10 RX ADMIN — KETOROLAC TROMETHAMINE 1 DROP: 5 SOLUTION/ DROPS OPHTHALMIC at 08:11

## 2018-11-10 RX ADMIN — CEFTRIAXONE 1 G: 1 INJECTION, SOLUTION INTRAVENOUS at 12:11

## 2018-11-10 RX ADMIN — OXYCODONE HYDROCHLORIDE AND ACETAMINOPHEN 1 TABLET: 7.5; 325 TABLET ORAL at 06:11

## 2018-11-10 RX ADMIN — ASPIRIN 81 MG 81 MG: 81 TABLET ORAL at 08:11

## 2018-11-10 RX ADMIN — STANDARDIZED SENNA CONCENTRATE AND DOCUSATE SODIUM 1 TABLET: 8.6; 5 TABLET, FILM COATED ORAL at 08:11

## 2018-11-10 RX ADMIN — PREDNISOLONE ACETATE 1 DROP: 10 SUSPENSION OPHTHALMIC at 08:11

## 2018-11-10 RX ADMIN — CALCIUM GLUCONATE 1000 MG: 98 INJECTION, SOLUTION INTRAVENOUS at 08:11

## 2018-11-10 RX ADMIN — CARVEDILOL 6.25 MG: 6.25 TABLET, FILM COATED ORAL at 08:11

## 2018-11-10 RX ADMIN — OXYCODONE HYDROCHLORIDE AND ACETAMINOPHEN 1 TABLET: 7.5; 325 TABLET ORAL at 05:11

## 2018-11-10 RX ADMIN — AMLODIPINE BESYLATE 5 MG: 5 TABLET ORAL at 08:11

## 2018-11-10 RX ADMIN — ENOXAPARIN SODIUM 40 MG: 100 INJECTION SUBCUTANEOUS at 04:11

## 2018-11-10 RX ADMIN — KETOROLAC TROMETHAMINE 1 DROP: 5 SOLUTION/ DROPS OPHTHALMIC at 04:11

## 2018-11-10 RX ADMIN — CEFTRIAXONE 1 G: 1 INJECTION, SOLUTION INTRAVENOUS at 01:11

## 2018-11-10 RX ADMIN — SODIUM CHLORIDE: 0.9 INJECTION, SOLUTION INTRAVENOUS at 12:11

## 2018-11-10 RX ADMIN — PREDNISOLONE ACETATE 1 DROP: 10 SUSPENSION OPHTHALMIC at 04:11

## 2018-11-10 NOTE — NURSING
No acute changes tolerating iv fluids has another stool more loose formed too good urine output too. Left foot dressing clean and dry both lower legs elevated off bed. Sister at bedside . No other areas of skin breakdown . Wound to left foot is dry  and the skin is hyperpigmented .

## 2018-11-10 NOTE — ASSESSMENT & PLAN NOTE
Secondary to LAYNE and bactrim/aldactone/acei - holding agents  kayexlate given in ED, again x2  Calcium gluconate 500mg IV and lasix IV x one dose   Bmp in am

## 2018-11-10 NOTE — PLAN OF CARE
Problem: Skin Integrity Impairment, Risk/Actual (Adult)  Intervention: Promote/Optimize Nutrition   11/10/18 0435   Nutrition Interventions   Oral Nutrition Promotion medicated;rest periods promoted;safe use of adaptive equipment encouraged     Intervention: Prevent/Manage Excess Moisture   11/10/18 0435   Hygiene Care   Perineal Care absorbent pad changed   Bathing/Skin Care linen changed     Intervention: Prevent/Minimize Sheer/Friction Injuries   11/10/18 0435   Skin Interventions   Pressure Reduction Devices Pressure-redistributing mattress utilized;Heel offloading device utilized   Pressure Reduction Techniques frequent weight shift encouraged;weight shift assistance provided;heels elevated off bed

## 2018-11-10 NOTE — ASSESSMENT & PLAN NOTE
-L foot wound multifactorial likely due to DM, PVD and venous insufficiency - rec angiography with possible intervention; postponed case due to hyperkalemia and LAYNE; cont optimization with possible Nephrology consult if persists  -Cont wound care consult recs  -Cont offloading  -Abx per primary team  -Strict glycemic control

## 2018-11-10 NOTE — ASSESSMENT & PLAN NOTE
Secondary to LAYNE and bactrim/aldactone/acei - holding agents  kayexlate given in ED, again x2  Calcium gluconate 500mg IV and then 1 gm followed   Bmp in am

## 2018-11-10 NOTE — PROGRESS NOTES
[facility]  Progress Note    Patient Name: Marvin Ray  MRN: [id number]  Patient Class: IP- Inpatient   Admission Date: 11/8/2018  Length of Stay: 2 days  Attending Physician: Ludivina Rodríguez MD  Primary Care Provider: Rubén Davis MD        Subjective:     Principal Problem:Cellulitis    HPI:  59 yo male with CAD, HLP, hepatosplenomegaly/ascites, DM2 with CKD3 and retinopathy, PVD, gout, chronic combined CHF and chronic BLE wounds L>R presented from Dr. Chan office with concern for nonhealing wound to E. He was recently given bactrim and presented today with LAYNE, hyperkalemia and increased pain and edema to LLE. Pt has h/o Group G strep bacteremia (10/10). ICD implanted 11/2.  The patient's sister reports doing dressing changes of LE wounds 5 times a day. Noticeable weeping of fluid from the LE. Pt made NPO by vascular surgery and plan for angiogram and possible stent placement to LE to assist with better LE wound healing.     Hospital Course:  PT admitted with non-healing Left foot wounds. Plan for angiogram  By vascular surgery cancelled due to hyperkalemia/LAYNE.  Pt received kayexalate x 2 and  Potassium persistently high. Add calcium gluconate x one dose and IV lasix. Monitor K levels and plan for possible intervention in next few days if labs improve. Wound care consulted.     11/10- serum potassium elevated, check urine electrolytes; calcium IV given, pt frustrated about being in hospital and not able to ambulate on his own     Interval History: pt frustrated about pain and not being able to ambulate on his own     Review of Systems   Constitutional: Negative for chills and fever.   HENT: Negative for congestion.    Eyes: Negative for visual disturbance.   Respiratory: Negative for shortness of breath.    Cardiovascular: Negative for chest pain.   Gastrointestinal: Negative for abdominal pain.   Endocrine: Negative for polyphagia.   Genitourinary: Negative  for dysuria.   Musculoskeletal: Negative for back pain.   Skin: Positive for color change and wound. Negative for pallor and rash.   Allergic/Immunologic: Negative for immunocompromised state.   Neurological: Negative for weakness.   Hematological: Does not bruise/bleed easily.   Psychiatric/Behavioral: Negative for confusion.     Objective:     Vital Signs (Most Recent):  Temp: 97.5 °F (36.4 °C) (11/10/18 1152)  Pulse: 76 (11/10/18 1152)  Resp: 16 (11/10/18 1152)  BP: 120/67 (11/10/18 1152)  SpO2: (!) 92 % (11/10/18 1152) Vital Signs (24h Range):  Temp:  [97.5 °F (36.4 °C)-99.1 °F (37.3 °C)] 97.5 °F (36.4 °C)  Pulse:  [68-82] 76  Resp:  [16-19] 16  SpO2:  [89 %-100 %] 92 %  BP: (102-120)/(58-74) 120/67     Weight: 97.3 kg (214 lb 8.1 oz)  Body mass index is 30.78 kg/m².    Intake/Output Summary (Last 24 hours) at 11/10/2018 1304  Last data filed at 11/10/2018 1248  Gross per 24 hour   Intake 1743 ml   Output 400 ml   Net 1343 ml      Physical Exam   Constitutional: He is oriented to person, place, and time. He appears well-developed and well-nourished. No distress.   HENT:   Head: Normocephalic and atraumatic.   Eyes: Conjunctivae are normal.   Neck: Neck supple.   Cardiovascular: Normal rate.   Pulses:       Femoral pulses are 2+ on the right side, and 2+ on the left side.       Dorsalis pedis pulses are Detected w/ doppler on the right side, and Detected w/ doppler on the left side.        Posterior tibial pulses are Detected w/ doppler on the right side, and Detected w/ doppler on the left side.   Pulmonary/Chest: Effort normal. No respiratory distress. He exhibits no tenderness.   Abdominal: Soft. He exhibits no distension and no mass. There is no tenderness. There is no rebound and no guarding.   Musculoskeletal: Normal range of motion. He exhibits edema. He exhibits no tenderness or deformity.   Feet:   Right Foot:   Skin Integrity: Negative for ulcer, blister, skin breakdown, callus or dry skin.   Left Foot:    Skin Integrity: Positive for ulcer, blister, skin breakdown, callus and dry skin.   Neurological: He is alert and oriented to person, place, and time. No sensory deficit.   Skin: Skin is warm and dry. Capillary refill takes less than 2 seconds. No rash noted. No erythema. No pallor.   Psychiatric: He has a normal mood and affect.   Vitals reviewed.      Significant Labs:   Blood Culture:   Recent Labs   Lab 11/08/18  1817 11/08/18  1820   LABBLOO No Growth to date  No Growth to date No Growth to date  No Growth to date     BMP:   Recent Labs   Lab 11/10/18  0419   GLU 91   *   K 5.8*      CO2 20*   BUN 44*   CREATININE 1.4   CALCIUM 9.0     CBC:   Recent Labs   Lab 11/09/18  0507   WBC 11.12   HGB 10.4*   HCT 31.3*   *       Significant Imaging: I have reviewed all pertinent imaging results/findings within the past 24 hours.    Assessment/Plan:      * Cellulitis    LLE >R; secondary to PVD  Repeat blood cultures  Rocephin IV Q12 based on last blood culture +GroupG strep  Vascular surgery consulted - see PVD  Pain control  Wound care consulted          Hyperkalemia    Secondary to LAYNE and bactrim/aldactone/acei - holding agents  kayexlate given in ED, again x2  Calcium gluconate 500mg IV and then 1 gm followed   Bmp in am        Metabolic acidosis    Secondary to LAYNE  Bicarb given in ED  Bmp in am        Acute kidney injury    Pt has reported baseline CKD3  Recently placed on bactrim in addition to acei and aldactone/lasix - hold offending agents  Gentle IVF  BMP in am  Renal dose antibiotics  Avoid nephrotoxins        Open wound of left foot    Wound care consulted  See above        Chronic combined systolic and diastolic heart failure    No acute issues  Hold aldactone and acei insetting of hyperkalemia and LAYNE   Resume BB  S/p ICD 11/2       Hepatosplenomegaly    Recent paracentesis - negative culture   Monitor CMP             PVD (peripheral vascular disease)    NPo after MN  Angiogram in  am  Vascular surgery consulted  Pain control        CAD (coronary artery disease)    No acute issues  Resume aspirin and zetia  Statin intolerance         Essential hypertension      Controlled  Resume amlodipine and BB  Hold aldactone and acei as stated above      DM type 2 with diabetic peripheral neuropathy    Uncontrolled, A1c 8.3%  Basal -bolus insulin   Diabetic diet             VTE Risk Mitigation (From admission, onward)        Ordered     enoxaparin injection 40 mg  Daily      11/08/18 1501     IP VTE HIGH RISK PATIENT  Once      11/08/18 1501              Ludivina Rodríguez MD  Department of Hospital Medicine   [facility]

## 2018-11-10 NOTE — SUBJECTIVE & OBJECTIVE
Interval History: pt reports foot pain fairly well controlled     Review of Systems   Constitutional: Negative for chills and fever.   HENT: Negative for congestion.    Eyes: Negative for visual disturbance.   Respiratory: Negative for shortness of breath.    Cardiovascular: Negative for chest pain.   Gastrointestinal: Negative for abdominal pain.   Endocrine: Negative for polyphagia.   Genitourinary: Negative for dysuria.   Musculoskeletal: Negative for back pain.   Skin: Positive for color change and wound. Negative for pallor and rash.   Allergic/Immunologic: Negative for immunocompromised state.   Neurological: Negative for weakness.   Hematological: Does not bruise/bleed easily.   Psychiatric/Behavioral: Negative for confusion.     Objective:     Vital Signs (Most Recent):  Temp: 99.1 °F (37.3 °C) (11/09/18 1915)  Pulse: 80 (11/09/18 1920)  Resp: 18 (11/09/18 1920)  BP: 102/74 (11/09/18 1915)  SpO2: (!) 92 % (11/09/18 1920) Vital Signs (24h Range):  Temp:  [98 °F (36.7 °C)-99.1 °F (37.3 °C)] 99.1 °F (37.3 °C)  Pulse:  [66-84] 80  Resp:  [17-20] 18  SpO2:  [89 %-100 %] 92 %  BP: (102-143)/(59-83) 102/74     Weight: 97.3 kg (214 lb 8.1 oz)  Body mass index is 30.78 kg/m².    Intake/Output Summary (Last 24 hours) at 11/9/2018 2037  Last data filed at 11/9/2018 1900  Gross per 24 hour   Intake 1752.33 ml   Output 400 ml   Net 1352.33 ml      Physical Exam   Constitutional: He is oriented to person, place, and time. He appears well-developed and well-nourished. No distress.   HENT:   Head: Normocephalic and atraumatic.   Eyes: Conjunctivae are normal.   Neck: Neck supple.   Cardiovascular: Normal rate.   Pulses:       Femoral pulses are 2+ on the right side, and 2+ on the left side.       Dorsalis pedis pulses are Detected w/ doppler on the right side, and Detected w/ doppler on the left side.        Posterior tibial pulses are Detected w/ doppler on the right side, and Detected w/ doppler on the left side.    Pulmonary/Chest: Effort normal. No respiratory distress. He exhibits no tenderness.   Abdominal: Soft. He exhibits no distension and no mass. There is no tenderness. There is no rebound and no guarding.   Musculoskeletal: Normal range of motion. He exhibits edema. He exhibits no tenderness or deformity.   Feet:   Right Foot:   Skin Integrity: Negative for ulcer, blister, skin breakdown, callus or dry skin.   Left Foot:   Skin Integrity: Positive for ulcer, blister, skin breakdown, callus and dry skin.   Neurological: He is alert and oriented to person, place, and time. No sensory deficit.   Skin: Skin is warm and dry. Capillary refill takes less than 2 seconds. No rash noted. No erythema. No pallor.   Psychiatric: He has a normal mood and affect.   Vitals reviewed.      Significant Labs:   Blood Culture:   Recent Labs   Lab 11/08/18  1817 11/08/18  1820   LABBLOO No Growth to date No Growth to date     BMP:   Recent Labs   Lab 11/09/18  1325   GLU 65*      K 6.4*   *   CO2 21*   BUN 46*   CREATININE 1.5*   CALCIUM 9.7     CBC:   Recent Labs   Lab 11/08/18  1041 11/09/18  0507   WBC 10.13 11.12   HGB 11.0* 10.4*   HCT 32.3* 31.3*   * 409*     POCT Glucose:   Recent Labs   Lab 11/09/18  1156 11/09/18  1626 11/09/18  1913   POCTGLUCOSE 82 105 131*       Significant Imaging: I have reviewed all pertinent imaging results/findings within the past 24 hours.

## 2018-11-10 NOTE — ASSESSMENT & PLAN NOTE
Pt has reported baseline CKD3  Recently placed on bactrim in addition to acei and aldactone/lasix - hold offending agents  Gentle IVF  BMP in am  Renal dose antibiotics  Avoid nephrotoxins

## 2018-11-10 NOTE — PLAN OF CARE
Problem: Fall Risk (Adult)  Goal: Identify Related Risk Factors and Signs and Symptoms  Related risk factors and signs and symptoms are identified upon initiation of Human Response Clinical Practice Guideline (CPG)  Outcome: Ongoing (interventions implemented as appropriate)   11/10/18 1300   Fall Risk   Related Risk Factors (Fall Risk) age-related changes;culprit medication(s);fatigue/slow reaction;fear of falling;gait/mobility problems;objects hard to reach;polypharmacy   Signs and Symptoms (Fall Risk) presence of risk factors

## 2018-11-10 NOTE — PLAN OF CARE
Problem: Diabetes, Type 2 (Adult)  Goal: Signs and Symptoms of Listed Potential Problems Will be Absent, Minimized or Managed (Diabetes, Type 2)  Signs and symptoms of listed potential problems will be absent, minimized or managed by discharge/transition of care (reference Diabetes, Type 2 (Adult) CPG).  Outcome: Ongoing (interventions implemented as appropriate)   11/10/18 1300   Diabetes, Type 2   Problems Assessed (Type 2 Diabetes) hyperglycemia;hypoglycemia;situational response   Problems Present (Type 2 Diabetes) hyperglycemia

## 2018-11-10 NOTE — NURSING
Bedside rounding report given to santiago damico rn on patient progress and updated handoff report sheet given to her . No acute distress , dressing to left lower foot clean dry and intact. No changes on telemetry . Good results today with kayexalate . Tolerating iv fluids. No new skin breakdown areas . Sister sania at bedside call light in reach head of bed up side rail up x 2. Tele box on .

## 2018-11-10 NOTE — NURSING
Upon arrival to floor: cardiac monitoring in progress, patient oriented to room, call bell in reach and bed in lowest position. Denies pain or discomfort at this time. No apparent distress noted.

## 2018-11-10 NOTE — SUBJECTIVE & OBJECTIVE
Interval History: pt frustrated about pain and not being able to ambulate on his own     Review of Systems   Constitutional: Negative for chills and fever.   HENT: Negative for congestion.    Eyes: Negative for visual disturbance.   Respiratory: Negative for shortness of breath.    Cardiovascular: Negative for chest pain.   Gastrointestinal: Negative for abdominal pain.   Endocrine: Negative for polyphagia.   Genitourinary: Negative for dysuria.   Musculoskeletal: Negative for back pain.   Skin: Positive for color change and wound. Negative for pallor and rash.   Allergic/Immunologic: Negative for immunocompromised state.   Neurological: Negative for weakness.   Hematological: Does not bruise/bleed easily.   Psychiatric/Behavioral: Negative for confusion.     Objective:     Vital Signs (Most Recent):  Temp: 97.5 °F (36.4 °C) (11/10/18 1152)  Pulse: 76 (11/10/18 1152)  Resp: 16 (11/10/18 1152)  BP: 120/67 (11/10/18 1152)  SpO2: (!) 92 % (11/10/18 1152) Vital Signs (24h Range):  Temp:  [97.5 °F (36.4 °C)-99.1 °F (37.3 °C)] 97.5 °F (36.4 °C)  Pulse:  [68-82] 76  Resp:  [16-19] 16  SpO2:  [89 %-100 %] 92 %  BP: (102-120)/(58-74) 120/67     Weight: 97.3 kg (214 lb 8.1 oz)  Body mass index is 30.78 kg/m².    Intake/Output Summary (Last 24 hours) at 11/10/2018 1304  Last data filed at 11/10/2018 1248  Gross per 24 hour   Intake 1743 ml   Output 400 ml   Net 1343 ml      Physical Exam   Constitutional: He is oriented to person, place, and time. He appears well-developed and well-nourished. No distress.   HENT:   Head: Normocephalic and atraumatic.   Eyes: Conjunctivae are normal.   Neck: Neck supple.   Cardiovascular: Normal rate.   Pulses:       Femoral pulses are 2+ on the right side, and 2+ on the left side.       Dorsalis pedis pulses are Detected w/ doppler on the right side, and Detected w/ doppler on the left side.        Posterior tibial pulses are Detected w/ doppler on the right side, and Detected w/ doppler on  the left side.   Pulmonary/Chest: Effort normal. No respiratory distress. He exhibits no tenderness.   Abdominal: Soft. He exhibits no distension and no mass. There is no tenderness. There is no rebound and no guarding.   Musculoskeletal: Normal range of motion. He exhibits edema. He exhibits no tenderness or deformity.   Feet:   Right Foot:   Skin Integrity: Negative for ulcer, blister, skin breakdown, callus or dry skin.   Left Foot:   Skin Integrity: Positive for ulcer, blister, skin breakdown, callus and dry skin.   Neurological: He is alert and oriented to person, place, and time. No sensory deficit.   Skin: Skin is warm and dry. Capillary refill takes less than 2 seconds. No rash noted. No erythema. No pallor.   Psychiatric: He has a normal mood and affect.   Vitals reviewed.      Significant Labs:   Blood Culture:   Recent Labs   Lab 11/08/18  1817 11/08/18  1820   LABBLOO No Growth to date  No Growth to date No Growth to date  No Growth to date     BMP:   Recent Labs   Lab 11/10/18  0419   GLU 91   *   K 5.8*      CO2 20*   BUN 44*   CREATININE 1.4   CALCIUM 9.0     CBC:   Recent Labs   Lab 11/09/18  0507   WBC 11.12   HGB 10.4*   HCT 31.3*   *       Significant Imaging: I have reviewed all pertinent imaging results/findings within the past 24 hours.

## 2018-11-10 NOTE — SUBJECTIVE & OBJECTIVE
Medications:  Continuous Infusions:   sodium chloride 0.9% 50 mL/hr at 11/09/18 1354     Scheduled Meds:   allopurinol  200 mg Oral Daily    amLODIPine  5 mg Oral Daily    aspirin  81 mg Oral Daily    brimonidine 0.1%  1 drop Both Eyes TID    carvedilol  6.25 mg Oral BID    cefTRIAXone (ROCEPHIN) IVPB  1 g Intravenous Q12H    dorzolamide-timolol 2-0.5%  1 drop Both Eyes BID    enoxaparin  40 mg Subcutaneous Daily    ezetimibe  10 mg Oral Daily    furosemide  20 mg Oral Daily    ketorolac 0.5%  1 drop Right Eye TID    prednisoLONE acetate  1 drop Right Eye TID    senna-docusate 8.6-50 mg  1 tablet Oral BID     PRN Meds:acetaminophen, dextrose 50%, dextrose 50%, glucagon (human recombinant), glucose, glucose, influenza, insulin aspart U-100, oxyCODONE-acetaminophen     Objective:     Vital Signs (Most Recent):  Temp: 98.6 °F (37 °C) (11/10/18 0718)  Pulse: 76 (11/10/18 0744)  Resp: 16 (11/10/18 0718)  BP: 118/60 (11/10/18 0718)  SpO2: (!) 93 % (11/10/18 0732) Vital Signs (24h Range):  Temp:  [98.3 °F (36.8 °C)-99.1 °F (37.3 °C)] 98.6 °F (37 °C)  Pulse:  [68-82] 76  Resp:  [16-19] 16  SpO2:  [89 %-100 %] 93 %  BP: (102-126)/(58-74) 118/60     Date 11/10/18 0700 - 11/11/18 0659   Shift 5779-4211 4234-3482 8003-3022 24 Hour Total   INTAKE   I.V.(mL/kg) 600(6.2)   600(6.2)   IV Piggyback 50   50   Shift Total(mL/kg) 650(6.7)   650(6.7)   OUTPUT   Urine(mL/kg/hr) 400   400   Shift Total(mL/kg) 400(4.1)   400(4.1)   Weight (kg) 97.3 97.3 97.3 97.3       Physical Exam   Constitutional: He is oriented to person, place, and time. He appears well-developed and well-nourished. No distress.   HENT:   Head: Normocephalic and atraumatic.   Eyes: Conjunctivae are normal.   Neck: Neck supple.   Cardiovascular: Normal rate.   Pulses:       Femoral pulses are 2+ on the right side, and 2+ on the left side.       Dorsalis pedis pulses are Detected w/ doppler on the right side, and Detected w/ doppler on the left side.         Posterior tibial pulses are Detected w/ doppler on the right side, and Detected w/ doppler on the left side.   Pulmonary/Chest: Effort normal. No respiratory distress. He exhibits no tenderness.   Abdominal: Soft. He exhibits no distension and no mass. There is no tenderness. There is no rebound and no guarding.   Musculoskeletal: Normal range of motion. He exhibits edema. He exhibits no tenderness or deformity.   Feet:   Right Foot:   Skin Integrity: Negative for ulcer, blister, skin breakdown, callus or dry skin.   Left Foot:   Skin Integrity: Positive for ulcer, blister, skin breakdown, callus and dry skin.   Neurological: He is alert and oriented to person, place, and time. No sensory deficit.   Skin: Skin is warm and dry. Capillary refill takes less than 2 seconds. No rash noted. No erythema. No pallor.   Psychiatric: He has a normal mood and affect.   Vitals reviewed.      Significant Labs:  All pertinent labs from the last 24 hours have been reviewed.    Significant Diagnostics:  I have reviewed all pertinent imaging results/findings within the past 24 hours.

## 2018-11-10 NOTE — PROGRESS NOTES
[facility]  Vascular Surgery  Progress Note    Patient Name: Marvin Ray  MRN: [id number]  Admission Date: 11/8/2018  Primary Care Provider: Rubén Davis MD    Subjective:     Interval History: Resting comfortably in bed this morning.    Post-Op Info:  Procedure(s) (LRB):  AORTOGRAM, WITH SERIALOGRAPHY, LEFT LOWER EXTREMITY, POSSIBLE INTERVENTION (Left)           Medications:  Continuous Infusions:   sodium chloride 0.9% 50 mL/hr at 11/09/18 1354     Scheduled Meds:   allopurinol  200 mg Oral Daily    amLODIPine  5 mg Oral Daily    aspirin  81 mg Oral Daily    brimonidine 0.1%  1 drop Both Eyes TID    carvedilol  6.25 mg Oral BID    cefTRIAXone (ROCEPHIN) IVPB  1 g Intravenous Q12H    dorzolamide-timolol 2-0.5%  1 drop Both Eyes BID    enoxaparin  40 mg Subcutaneous Daily    ezetimibe  10 mg Oral Daily    furosemide  20 mg Oral Daily    ketorolac 0.5%  1 drop Right Eye TID    prednisoLONE acetate  1 drop Right Eye TID    senna-docusate 8.6-50 mg  1 tablet Oral BID     PRN Meds:acetaminophen, dextrose 50%, dextrose 50%, glucagon (human recombinant), glucose, glucose, influenza, insulin aspart U-100, oxyCODONE-acetaminophen     Objective:     Vital Signs (Most Recent):  Temp: 98.6 °F (37 °C) (11/10/18 0718)  Pulse: 76 (11/10/18 0744)  Resp: 16 (11/10/18 0718)  BP: 118/60 (11/10/18 0718)  SpO2: (!) 93 % (11/10/18 0732) Vital Signs (24h Range):  Temp:  [98.3 °F (36.8 °C)-99.1 °F (37.3 °C)] 98.6 °F (37 °C)  Pulse:  [68-82] 76  Resp:  [16-19] 16  SpO2:  [89 %-100 %] 93 %  BP: (102-126)/(58-74) 118/60     Date 11/10/18 0700 - 11/11/18 0659   Shift 3201-4396 7866-2821 2197-9488 24 Hour Total   INTAKE   I.V.(mL/kg) 600(6.2)   600(6.2)   IV Piggyback 50   50   Shift Total(mL/kg) 650(6.7)   650(6.7)   OUTPUT   Urine(mL/kg/hr) 400   400   Shift Total(mL/kg) 400(4.1)   400(4.1)   Weight (kg) 97.3 97.3 97.3 97.3       Physical Exam   Constitutional: He is oriented to person, place, and  time. He appears well-developed and well-nourished. No distress.   HENT:   Head: Normocephalic and atraumatic.   Eyes: Conjunctivae are normal.   Neck: Neck supple.   Cardiovascular: Normal rate.   Pulses:       Femoral pulses are 2+ on the right side, and 2+ on the left side.       Dorsalis pedis pulses are Detected w/ doppler on the right side, and Detected w/ doppler on the left side.        Posterior tibial pulses are Detected w/ doppler on the right side, and Detected w/ doppler on the left side.   Pulmonary/Chest: Effort normal. No respiratory distress. He exhibits no tenderness.   Abdominal: Soft. He exhibits no distension and no mass. There is no tenderness. There is no rebound and no guarding.   Musculoskeletal: Normal range of motion. He exhibits edema. He exhibits no tenderness or deformity.   Feet:   Right Foot:   Skin Integrity: Negative for ulcer, blister, skin breakdown, callus or dry skin.   Left Foot:   Skin Integrity: Positive for ulcer, blister, skin breakdown, callus and dry skin.   Neurological: He is alert and oriented to person, place, and time. No sensory deficit.   Skin: Skin is warm and dry. Capillary refill takes less than 2 seconds. No rash noted. No erythema. No pallor.   Psychiatric: He has a normal mood and affect.   Vitals reviewed.      Significant Labs:  All pertinent labs from the last 24 hours have been reviewed.    Significant Diagnostics:  I have reviewed all pertinent imaging results/findings within the past 24 hours.    Assessment/Plan:     Open wound of left foot    -L foot wound multifactorial likely due to DM, PVD and venous insufficiency - rec angiography with possible intervention; postponed case due to hyperkalemia and LAYNE; cont optimization with possible Nephrology consult if persists  -Cont wound care consult recs  -Cont offloading  -Abx per primary team  -Strict glycemic control         Mitch Chan MD  Vascular Surgery  [facility] [place]

## 2018-11-10 NOTE — HOSPITAL COURSE
59 yo male with CAD, HLP, hepatosplenomegaly/ascites, DM2 with CKD3 and retinopathy, PVD, gout, chronic combined CHF and chronic BLE wounds L>R presented from Dr. Chan office with concern for nonhealing wound to LLE. He was recently given bactrim and presented today with LAYNE, hyperkalemia and increased pain and edema to LLE. Pt has h/o Group G strep bacteremia (10/10). ICD implanted 11/2.  The patient's sister reports doing dressing changes of LE wounds 5 times a day. Noticeable weeping of fluid from the LE. Pt made NPO by vascular surgery and plan for angiogram and possible stent placement to LE to assist with better LE wound healing. PT was admitted with non-healing Left foot wounds. Plan for angiogram  By vascular surgery cancelled due to hyperkalemia/LAYNE.  Pt received kayexalate x 2 and  Potassium persistently high. Was Add calcium gluconate x one dose and IV lasix. Monitor K levels and plan for possible intervention in next few days if labs improve. Wound care consulted.     11/10- serum potassium elevated, check urine electrolytes; calcium IV given, pt frustrated about being in hospital and not able to ambulate on his own   11/11- persistent hyperkalemia in setting of improving LAYNE; pt received IV lasix, kayexalate x2, calcium gluconate 1500mg; insulin and dextrose today, lasix IV again and albuterol high dose neb x one  11/12- potassium 4.9; nephrology added sodium bicarb; vascular surgery planning intervention with improved labs   S/P angiogram,on 11.13.18 ,1. US guided R CFA access  2. Aortogram   3. LLE angiogram  4. L SFA angioplasty with a 5 x 40 mm Skandia balloon  5. L AT angioplasty with a 2 x 220 mm Haiku balloon  6. L peroneal angioplasty with a 2 x 80 mm Haiku balloon  7. Moderate sedation,  S/P wound debridemnemnt,intraoprative cultures grow acinobacter,strep,enterococus,adjusted IV Abx,DC clindamycin,started on Cipro,continue with Rocephin per sensitivity.  S/P toes 2-5 amputation.  He has  hyperkalemia again,,will gave dose of kayexalate.imroved.  Had  local debridement .  plastic surgery was consulted and planing  For intervention as out patient.  Local wound care by wound care tewa was provided,,PT,OT was seeing patient.  Patient has been discharged home with HH,wound care,PO Abx and follow up with wound care center,vascular surgery,plastic,PCP in next 1- 2 weeks.

## 2018-11-10 NOTE — PROGRESS NOTES
[facility]  Progress Note    Patient Name: Marvin Ray  MRN: [id number]  Patient Class: IP- Inpatient   Admission Date: 11/8/2018  Length of Stay: 1 days  Attending Physician: Ludivina Rodríguez MD  Primary Care Provider: Rubén Davis MD        Subjective:     Principal Problem:Cellulitis    HPI:  57 yo male with CAD, HLP, hepatosplenomegaly/ascites, DM2 with CKD3 and retinopathy, PVD, gout, chronic combined CHF and chronic BLE wounds L>R presented from Dr. Chan office with concern for nonhealing wound to LLE. He was recently given bactrim and presented today with LAYNE, hyperkalemia and increased pain and edema to LLE. Pt has h/o Group G strep bacteremia (10/10). ICD implanted 11/2.  The patient's sister reports doing dressing changes of LE wounds 5 times a day. Noticeable weeping of fluid from the LE. Pt made NPO by vascular surgery and plan for angiogram and possible stent placement to LE to assist with better LE wound healing.     Hospital Course:  PT admitted with non-healing Left foot wounds. Plan for angiogram  By vascular surgery cancelled due to hyperkalemia/LAYNE.  Pt received kayexalate x 2 and  Potassium persistently high. Add calcium gluconate x one dose and IV lasix. Monitor K levels and plan for possible intervention in next few days if labs improve. Wound care consulted.     Interval History: pt reports foot pain fairly well controlled     Review of Systems   Constitutional: Negative for chills and fever.   HENT: Negative for congestion.    Eyes: Negative for visual disturbance.   Respiratory: Negative for shortness of breath.    Cardiovascular: Negative for chest pain.   Gastrointestinal: Negative for abdominal pain.   Endocrine: Negative for polyphagia.   Genitourinary: Negative for dysuria.   Musculoskeletal: Negative for back pain.   Skin: Positive for color change and wound. Negative for pallor and rash.   Allergic/Immunologic: Negative for  immunocompromised state.   Neurological: Negative for weakness.   Hematological: Does not bruise/bleed easily.   Psychiatric/Behavioral: Negative for confusion.     Objective:     Vital Signs (Most Recent):  Temp: 99.1 °F (37.3 °C) (11/09/18 1915)  Pulse: 80 (11/09/18 1920)  Resp: 18 (11/09/18 1920)  BP: 102/74 (11/09/18 1915)  SpO2: (!) 92 % (11/09/18 1920) Vital Signs (24h Range):  Temp:  [98 °F (36.7 °C)-99.1 °F (37.3 °C)] 99.1 °F (37.3 °C)  Pulse:  [66-84] 80  Resp:  [17-20] 18  SpO2:  [89 %-100 %] 92 %  BP: (102-143)/(59-83) 102/74     Weight: 97.3 kg (214 lb 8.1 oz)  Body mass index is 30.78 kg/m².    Intake/Output Summary (Last 24 hours) at 11/9/2018 2037  Last data filed at 11/9/2018 1900  Gross per 24 hour   Intake 1752.33 ml   Output 400 ml   Net 1352.33 ml      Physical Exam   Constitutional: He is oriented to person, place, and time. He appears well-developed and well-nourished. No distress.   HENT:   Head: Normocephalic and atraumatic.   Eyes: Conjunctivae are normal.   Neck: Neck supple.   Cardiovascular: Normal rate.   Pulses:       Femoral pulses are 2+ on the right side, and 2+ on the left side.       Dorsalis pedis pulses are Detected w/ doppler on the right side, and Detected w/ doppler on the left side.        Posterior tibial pulses are Detected w/ doppler on the right side, and Detected w/ doppler on the left side.   Pulmonary/Chest: Effort normal. No respiratory distress. He exhibits no tenderness.   Abdominal: Soft. He exhibits no distension and no mass. There is no tenderness. There is no rebound and no guarding.   Musculoskeletal: Normal range of motion. He exhibits edema. He exhibits no tenderness or deformity.   Feet:   Right Foot:   Skin Integrity: Negative for ulcer, blister, skin breakdown, callus or dry skin.   Left Foot:   Skin Integrity: Positive for ulcer, blister, skin breakdown, callus and dry skin.   Neurological: He is alert and oriented to person, place, and time. No sensory  deficit.   Skin: Skin is warm and dry. Capillary refill takes less than 2 seconds. No rash noted. No erythema. No pallor.   Psychiatric: He has a normal mood and affect.   Vitals reviewed.      Significant Labs:   Blood Culture:   Recent Labs   Lab 11/08/18  1817 11/08/18  1820   LABBLOO No Growth to date No Growth to date     BMP:   Recent Labs   Lab 11/09/18  1325   GLU 65*      K 6.4*   *   CO2 21*   BUN 46*   CREATININE 1.5*   CALCIUM 9.7     CBC:   Recent Labs   Lab 11/08/18  1041 11/09/18  0507   WBC 10.13 11.12   HGB 11.0* 10.4*   HCT 32.3* 31.3*   * 409*     POCT Glucose:   Recent Labs   Lab 11/09/18  1156 11/09/18  1626 11/09/18  1913   POCTGLUCOSE 82 105 131*       Significant Imaging: I have reviewed all pertinent imaging results/findings within the past 24 hours.    Assessment/Plan:      * Cellulitis    LLE >R; secondary to PVD  Repeat blood cultures  Rocephin IV Q12 based on last blood culture +GroupG strep  Vascular surgery consulted - see PVD  Pain control  Wound care consulted          Hyperkalemia    Secondary to LAYNE and bactrim/aldactone/acei - holding agents  kayexlate given in ED, again x2  Calcium gluconate 500mg IV and lasix IV x one dose   Bmp in am        Metabolic acidosis    Secondary to LAYNE  Bicarb given in ED  Bmp in am        Acute kidney injury    Pt has reported baseline CKD3  Recently placed on bactrim in addition to acei and aldactone/lasix - hold offending agents  Gentle IVF while NPO   BMP in am  Renal dose antibiotics  Avoid nephrotoxins        Open wound of left foot    Wound care consulted  See above        Chronic combined systolic and diastolic heart failure    No acute issues  Hold aldactone and acei insetting of hyperkalemia and LAYNE   Resume BB  S/p ICD 11/2       Hepatosplenomegaly    Recent paracentesis - negative culture   Monitor CMP             PVD (peripheral vascular disease)    NPo after MN  Angiogram in am  Vascular surgery consulted  Pain control         CAD (coronary artery disease)    No acute issues  Resume aspirin and zetia  Statin intolerance         Essential hypertension      Controlled  Resume amlodipine and BB  Hold aldactone and acei as stated above      DM type 2 with diabetic peripheral neuropathy    Uncontrolled, A1c 8.3%  Basal -bolus insulin   Diabetic diet             VTE Risk Mitigation (From admission, onward)        Ordered     enoxaparin injection 40 mg  Daily      11/08/18 1501     IP VTE HIGH RISK PATIENT  Once      11/08/18 1501              Ludivina Rodríguez MD  Department of Hospital Medicine   [facility]

## 2018-11-11 LAB
ALBUMIN SERPL BCP-MCNC: 2.2 G/DL
ALP SERPL-CCNC: 291 U/L
ALT SERPL W/O P-5'-P-CCNC: 20 U/L
AMMONIA PLAS-SCNC: 35 UMOL/L
ANION GAP SERPL CALC-SCNC: 5 MMOL/L
AST SERPL-CCNC: 22 U/L
BILIRUB SERPL-MCNC: 0.8 MG/DL
BUN SERPL-MCNC: 36 MG/DL
CALCIUM SERPL-MCNC: 9.1 MG/DL
CHLORIDE SERPL-SCNC: 107 MMOL/L
CO2 SERPL-SCNC: 22 MMOL/L
CREAT SERPL-MCNC: 1.1 MG/DL
EST. GFR  (AFRICAN AMERICAN): >60 ML/MIN/1.73 M^2
EST. GFR  (NON AFRICAN AMERICAN): >60 ML/MIN/1.73 M^2
GLUCOSE SERPL-MCNC: 113 MG/DL
POCT GLUCOSE: 145 MG/DL (ref 70–110)
POCT GLUCOSE: 161 MG/DL (ref 70–110)
POCT GLUCOSE: 191 MG/DL (ref 70–110)
POCT GLUCOSE: 206 MG/DL (ref 70–110)
POCT GLUCOSE: 233 MG/DL (ref 70–110)
POTASSIUM SERPL-SCNC: 5.8 MMOL/L
PROT SERPL-MCNC: 7.7 G/DL
SODIUM SERPL-SCNC: 134 MMOL/L

## 2018-11-11 PROCEDURE — 99233 SBSQ HOSP IP/OBS HIGH 50: CPT | Mod: ,,, | Performed by: SURGERY

## 2018-11-11 PROCEDURE — 36415 COLL VENOUS BLD VENIPUNCTURE: CPT

## 2018-11-11 PROCEDURE — 25000003 PHARM REV CODE 250: Performed by: EMERGENCY MEDICINE

## 2018-11-11 PROCEDURE — 94761 N-INVAS EAR/PLS OXIMETRY MLT: CPT

## 2018-11-11 PROCEDURE — 63600175 PHARM REV CODE 636 W HCPCS: Performed by: EMERGENCY MEDICINE

## 2018-11-11 PROCEDURE — 94760 N-INVAS EAR/PLS OXIMETRY 1: CPT

## 2018-11-11 PROCEDURE — 25000003 PHARM REV CODE 250: Performed by: HOSPITALIST

## 2018-11-11 PROCEDURE — 63600175 PHARM REV CODE 636 W HCPCS: Performed by: HOSPITALIST

## 2018-11-11 PROCEDURE — 80053 COMPREHEN METABOLIC PANEL: CPT

## 2018-11-11 PROCEDURE — 25000242 PHARM REV CODE 250 ALT 637 W/ HCPCS: Performed by: HOSPITALIST

## 2018-11-11 PROCEDURE — 94644 CONT INHLJ TX 1ST HOUR: CPT

## 2018-11-11 PROCEDURE — 25000003 PHARM REV CODE 250: Performed by: INTERNAL MEDICINE

## 2018-11-11 PROCEDURE — 21400001 HC TELEMETRY ROOM

## 2018-11-11 PROCEDURE — 82140 ASSAY OF AMMONIA: CPT

## 2018-11-11 PROCEDURE — 82533 TOTAL CORTISOL: CPT

## 2018-11-11 PROCEDURE — 63600175 PHARM REV CODE 636 W HCPCS: Performed by: INTERNAL MEDICINE

## 2018-11-11 PROCEDURE — 81000 URINALYSIS NONAUTO W/SCOPE: CPT

## 2018-11-11 RX ORDER — LACTULOSE 10 G/15ML
10 SOLUTION ORAL 2 TIMES DAILY
Status: DISPENSED | OUTPATIENT
Start: 2018-11-11 | End: 2018-11-14

## 2018-11-11 RX ORDER — SODIUM BICARBONATE 325 MG/1
650 TABLET ORAL 2 TIMES DAILY
Status: DISPENSED | OUTPATIENT
Start: 2018-11-11 | End: 2018-11-17

## 2018-11-11 RX ORDER — ALBUTEROL SULFATE 2.5 MG/.5ML
10 SOLUTION RESPIRATORY (INHALATION) ONCE
Status: COMPLETED | OUTPATIENT
Start: 2018-11-11 | End: 2018-11-11

## 2018-11-11 RX ORDER — COSYNTROPIN 0.25 MG/ML
0.25 INJECTION, POWDER, FOR SOLUTION INTRAMUSCULAR; INTRAVENOUS ONCE
Status: COMPLETED | OUTPATIENT
Start: 2018-11-11 | End: 2018-11-11

## 2018-11-11 RX ORDER — FUROSEMIDE 10 MG/ML
40 INJECTION INTRAMUSCULAR; INTRAVENOUS ONCE
Status: COMPLETED | OUTPATIENT
Start: 2018-11-11 | End: 2018-11-11

## 2018-11-11 RX ADMIN — STANDARDIZED SENNA CONCENTRATE AND DOCUSATE SODIUM 1 TABLET: 8.6; 5 TABLET, FILM COATED ORAL at 08:11

## 2018-11-11 RX ADMIN — PREDNISOLONE ACETATE 1 DROP: 10 SUSPENSION OPHTHALMIC at 09:11

## 2018-11-11 RX ADMIN — LACTULOSE 10 G: 20 SOLUTION ORAL at 09:11

## 2018-11-11 RX ADMIN — DORZOLAMIDE HYDROCHLORIDE AND TIMOLOL MALEATE 1 DROP: 20; 5 SOLUTION/ DROPS OPHTHALMIC at 08:11

## 2018-11-11 RX ADMIN — CEFTRIAXONE 1 G: 1 INJECTION, SOLUTION INTRAVENOUS at 12:11

## 2018-11-11 RX ADMIN — FUROSEMIDE 20 MG: 20 TABLET ORAL at 08:11

## 2018-11-11 RX ADMIN — OXYCODONE HYDROCHLORIDE AND ACETAMINOPHEN 1 TABLET: 7.5; 325 TABLET ORAL at 01:11

## 2018-11-11 RX ADMIN — SODIUM BICARBONATE 650 MG: 325 TABLET ORAL at 09:11

## 2018-11-11 RX ADMIN — CARVEDILOL 6.25 MG: 6.25 TABLET, FILM COATED ORAL at 08:11

## 2018-11-11 RX ADMIN — KETOROLAC TROMETHAMINE 1 DROP: 5 SOLUTION/ DROPS OPHTHALMIC at 02:11

## 2018-11-11 RX ADMIN — DORZOLAMIDE HYDROCHLORIDE AND TIMOLOL MALEATE 1 DROP: 20; 5 SOLUTION/ DROPS OPHTHALMIC at 09:11

## 2018-11-11 RX ADMIN — KETOROLAC TROMETHAMINE 1 DROP: 5 SOLUTION/ DROPS OPHTHALMIC at 08:11

## 2018-11-11 RX ADMIN — DEXTROSE MONOHYDRATE 25 G: 25 INJECTION, SOLUTION INTRAVENOUS at 02:11

## 2018-11-11 RX ADMIN — STANDARDIZED SENNA CONCENTRATE AND DOCUSATE SODIUM 1 TABLET: 8.6; 5 TABLET, FILM COATED ORAL at 09:11

## 2018-11-11 RX ADMIN — PREDNISOLONE ACETATE 1 DROP: 10 SUSPENSION OPHTHALMIC at 08:11

## 2018-11-11 RX ADMIN — ALBUTEROL SULFATE 10 MG: 2.5 SOLUTION RESPIRATORY (INHALATION) at 03:11

## 2018-11-11 RX ADMIN — FUROSEMIDE 40 MG: 10 INJECTION, SOLUTION INTRAMUSCULAR; INTRAVENOUS at 01:11

## 2018-11-11 RX ADMIN — CARVEDILOL 6.25 MG: 6.25 TABLET, FILM COATED ORAL at 09:11

## 2018-11-11 RX ADMIN — PREDNISOLONE ACETATE 1 DROP: 10 SUSPENSION OPHTHALMIC at 02:11

## 2018-11-11 RX ADMIN — OXYCODONE HYDROCHLORIDE AND ACETAMINOPHEN 1 TABLET: 7.5; 325 TABLET ORAL at 04:11

## 2018-11-11 RX ADMIN — OXYCODONE HYDROCHLORIDE AND ACETAMINOPHEN 1 TABLET: 7.5; 325 TABLET ORAL at 10:11

## 2018-11-11 RX ADMIN — AMLODIPINE BESYLATE 5 MG: 5 TABLET ORAL at 08:11

## 2018-11-11 RX ADMIN — CEFTRIAXONE 1 G: 1 INJECTION, SOLUTION INTRAVENOUS at 01:11

## 2018-11-11 RX ADMIN — BRIMONIDINE TARTRATE 1 DROP: 1 SOLUTION/ DROPS OPHTHALMIC at 02:11

## 2018-11-11 RX ADMIN — INSULIN HUMAN 10 UNITS: 100 INJECTION, SOLUTION PARENTERAL at 02:11

## 2018-11-11 RX ADMIN — BRIMONIDINE TARTRATE 1 DROP: 1 SOLUTION/ DROPS OPHTHALMIC at 09:11

## 2018-11-11 RX ADMIN — COSYNTROPIN 0.25 MG: 0.25 INJECTION, POWDER, LYOPHILIZED, FOR SOLUTION INTRAVENOUS at 09:11

## 2018-11-11 RX ADMIN — ALLOPURINOL 200 MG: 100 TABLET ORAL at 08:11

## 2018-11-11 RX ADMIN — ASPIRIN 81 MG 81 MG: 81 TABLET ORAL at 08:11

## 2018-11-11 RX ADMIN — ENOXAPARIN SODIUM 40 MG: 100 INJECTION SUBCUTANEOUS at 05:11

## 2018-11-11 RX ADMIN — BRIMONIDINE TARTRATE 1 DROP: 1 SOLUTION/ DROPS OPHTHALMIC at 08:11

## 2018-11-11 RX ADMIN — EZETIMIBE 10 MG: 10 TABLET ORAL at 08:11

## 2018-11-11 RX ADMIN — SODIUM CHLORIDE: 0.9 INJECTION, SOLUTION INTRAVENOUS at 06:11

## 2018-11-11 NOTE — PLAN OF CARE
Problem: Diabetes, Type 2 (Adult)  Goal: Signs and Symptoms of Listed Potential Problems Will be Absent, Minimized or Managed (Diabetes, Type 2)  Signs and symptoms of listed potential problems will be absent, minimized or managed by discharge/transition of care (reference Diabetes, Type 2 (Adult) CPG).  Outcome: Ongoing (interventions implemented as appropriate)  AO x4, no falls or injury. Up to bedside commode with x1 assist. Repositions self. Medicated for pain as ordered. Dressing changed as ordered, completed at 1400, to be change again every 8 hrs. Received abx and IV fluids. VS stable, afebrile. CBG within acceptable limits      Problem: Patient Care Overview  Goal: Plan of Care Review   11/11/18 3625   Coping/Psychosocial   Plan Of Care Reviewed With patient

## 2018-11-11 NOTE — SUBJECTIVE & OBJECTIVE
Interval History: no acute events overnight     Review of Systems   Constitutional: Negative for chills and fever.   HENT: Negative for congestion.    Eyes: Negative for visual disturbance.   Respiratory: Negative for shortness of breath.    Cardiovascular: Negative for chest pain.   Gastrointestinal: Negative for abdominal pain.   Endocrine: Negative for polyphagia.   Genitourinary: Negative for dysuria.   Musculoskeletal: Negative for back pain.   Skin: Positive for color change and wound. Negative for pallor and rash.   Allergic/Immunologic: Negative for immunocompromised state.   Neurological: Negative for weakness.   Hematological: Does not bruise/bleed easily.   Psychiatric/Behavioral: Negative for confusion.     Objective:     Vital Signs (Most Recent):  Temp: 96.1 °F (35.6 °C) (11/11/18 1113)  Pulse: 79 (11/11/18 1113)  Resp: 18 (11/11/18 1113)  BP: (!) 115/59 (11/11/18 1113)  SpO2: (!) 93 % (11/11/18 1113) Vital Signs (24h Range):  Temp:  [96.1 °F (35.6 °C)-98 °F (36.7 °C)] 96.1 °F (35.6 °C)  Pulse:  [68-85] 79  Resp:  [16-18] 18  SpO2:  [92 %-96 %] 93 %  BP: (114-137)/(56-67) 115/59     Weight: 97.3 kg (214 lb 8.1 oz)  Body mass index is 30.78 kg/m².    Intake/Output Summary (Last 24 hours) at 11/11/2018 1302  Last data filed at 11/11/2018 0900  Gross per 24 hour   Intake 1850 ml   Output 1075 ml   Net 775 ml      Physical Exam   Constitutional: He is oriented to person, place, and time. He appears well-developed and well-nourished. No distress.   HENT:   Head: Normocephalic and atraumatic.   Eyes: Conjunctivae are normal.   Neck: Neck supple.   Cardiovascular: Normal rate.   Pulses:       Femoral pulses are 2+ on the right side, and 2+ on the left side.       Dorsalis pedis pulses are Detected w/ doppler on the right side, and Detected w/ doppler on the left side.        Posterior tibial pulses are Detected w/ doppler on the right side, and Detected w/ doppler on the left side.   Pulmonary/Chest: Effort  normal. No respiratory distress. He exhibits no tenderness.   Abdominal: Soft. He exhibits no distension and no mass. There is no tenderness. There is no rebound and no guarding.   Musculoskeletal: Normal range of motion. He exhibits edema. He exhibits no tenderness or deformity.   Feet:   Right Foot:   Skin Integrity: Negative for ulcer, blister, skin breakdown, callus or dry skin.   Left Foot:   Skin Integrity: Positive for ulcer, blister, skin breakdown, callus and dry skin.   Neurological: He is alert and oriented to person, place, and time. No sensory deficit.   Skin: Skin is warm and dry. Capillary refill takes less than 2 seconds. No rash noted. No erythema. No pallor.   Psychiatric: He has a normal mood and affect.   Vitals reviewed.      Significant Labs:   BMP:   Recent Labs   Lab 11/11/18  0430   *   *   K 5.8*      CO2 22*   BUN 36*   CREATININE 1.1   CALCIUM 9.1     CBC: No results for input(s): WBC, HGB, HCT, PLT in the last 48 hours.    Significant Imaging: I have reviewed and interpreted all pertinent imaging results/findings within the past 24 hours.

## 2018-11-11 NOTE — ASSESSMENT & PLAN NOTE
Secondary to LAYNE and bactrim/aldactone/acei - holding agents  kayexlate given in ED, again x2  Calcium gluconate 500mg IV and then 1 gm followed = 1500mg  Lasix 40mg IV  - insulin and dextrose and high dose nebs   Nephrology consulted for any further recs   Bmp in am

## 2018-11-11 NOTE — PROGRESS NOTES
[facility]  Vascular Surgery  Progress Note    Patient Name: Marvin Ray  MRN: [id number]  Admission Date: 11/8/2018  Primary Care Provider: Rubén Davis MD    Subjective:     Interval History: No complaints today.  Receiving local wound care to L foot TID.    Post-Op Info:  Procedure(s) (LRB):  AORTOGRAM, WITH SERIALOGRAPHY, LEFT LOWER EXTREMITY, POSSIBLE INTERVENTION (Left)           Medications:  Continuous Infusions:   sodium chloride 0.9% 50 mL/hr at 11/11/18 0627     Scheduled Meds:   allopurinol  200 mg Oral Daily    amLODIPine  5 mg Oral Daily    aspirin  81 mg Oral Daily    brimonidine 0.1%  1 drop Both Eyes TID    carvedilol  6.25 mg Oral BID    cefTRIAXone (ROCEPHIN) IVPB  1 g Intravenous Q12H    dorzolamide-timolol 2-0.5%  1 drop Both Eyes BID    enoxaparin  40 mg Subcutaneous Daily    ezetimibe  10 mg Oral Daily    furosemide  20 mg Oral Daily    ketorolac 0.5%  1 drop Right Eye TID    prednisoLONE acetate  1 drop Right Eye TID    senna-docusate 8.6-50 mg  1 tablet Oral BID     PRN Meds:acetaminophen, dextrose 50%, dextrose 50%, glucagon (human recombinant), glucose, glucose, influenza, insulin aspart U-100, oxyCODONE-acetaminophen     Objective:     Vital Signs (Most Recent):  Temp: 96.1 °F (35.6 °C) (11/11/18 1113)  Pulse: 79 (11/11/18 1113)  Resp: 18 (11/11/18 1113)  BP: (!) 115/59 (11/11/18 1113)  SpO2: (!) 93 % (11/11/18 1113) Vital Signs (24h Range):  Temp:  [96.1 °F (35.6 °C)-98 °F (36.7 °C)] 96.1 °F (35.6 °C)  Pulse:  [68-85] 79  Resp:  [16-18] 18  SpO2:  [92 %-96 %] 93 %  BP: (114-137)/(56-67) 115/59     Date 11/11/18 0700 - 11/12/18 0659   Shift 5227-4259 9160-1809 3781-6272 24 Hour Total   INTAKE   P.O. 240   240   Shift Total(mL/kg) 240(2.5)   240(2.5)   OUTPUT   Urine(mL/kg/hr) 775   775   Shift Total(mL/kg) 775(8)   775(8)   Weight (kg) 97.3 97.3 97.3 97.3       Physical Exam   Constitutional: He is oriented to person, place, and time. He appears  well-developed and well-nourished. No distress.   HENT:   Head: Normocephalic and atraumatic.   Eyes: Conjunctivae are normal.   Neck: Neck supple.   Cardiovascular: Normal rate.   Pulses:       Femoral pulses are 2+ on the right side, and 2+ on the left side.       Dorsalis pedis pulses are Detected w/ doppler on the right side, and Detected w/ doppler on the left side.        Posterior tibial pulses are Detected w/ doppler on the right side, and Detected w/ doppler on the left side.   Pulmonary/Chest: Effort normal. No respiratory distress. He exhibits no tenderness.   Abdominal: Soft. He exhibits no distension and no mass. There is no tenderness. There is no rebound and no guarding.   Musculoskeletal: Normal range of motion. He exhibits edema. He exhibits no tenderness or deformity.   Feet:   Right Foot:   Skin Integrity: Negative for ulcer, blister, skin breakdown, callus or dry skin.   Left Foot:   Skin Integrity: Positive for ulcer, blister, skin breakdown, callus and dry skin.   Neurological: He is alert and oriented to person, place, and time. No sensory deficit.   Skin: Skin is warm and dry. Capillary refill takes less than 2 seconds. No rash noted. No erythema. No pallor.   Psychiatric: He has a normal mood and affect.   Vitals reviewed.      Significant Labs:  All pertinent labs from the last 24 hours have been reviewed.    Significant Diagnostics:  I have reviewed all pertinent imaging results/findings within the past 24 hours.    Assessment/Plan:     Open wound of left foot    -L foot wound multifactorial likely due to DM, PVD and venous insufficiency - rec angiography with possible intervention; postponed case due to hyperkalemia and LAYNE; cont optimization with  Nephrology consult if persists   -Cont wound care consult recs  -Cont offloading  -Abx per primary team  -Strict glycemic control         Mitch Chan MD  Vascular Surgery  [facility]

## 2018-11-11 NOTE — PLAN OF CARE
Problem: Patient Care Overview  Goal: Plan of Care Review  Outcome: Ongoing (interventions implemented as appropriate)  Plan of care reviewed with patient and sister at bedside. All questions answered. Fall precautions are in place. Bedside commode at bedside. Call light within reach. Bed in lowest position. No complaints throughout the night. PIV intact infusing NS at 50. NSR. Foot ulcer to the right foot painted with betadine, covered with ABD pad, 4x4 gauze, and 2 kerlix. Will continue to monitor.

## 2018-11-11 NOTE — SUBJECTIVE & OBJECTIVE
Medications:  Continuous Infusions:   sodium chloride 0.9% 50 mL/hr at 11/11/18 0627     Scheduled Meds:   allopurinol  200 mg Oral Daily    amLODIPine  5 mg Oral Daily    aspirin  81 mg Oral Daily    brimonidine 0.1%  1 drop Both Eyes TID    carvedilol  6.25 mg Oral BID    cefTRIAXone (ROCEPHIN) IVPB  1 g Intravenous Q12H    dorzolamide-timolol 2-0.5%  1 drop Both Eyes BID    enoxaparin  40 mg Subcutaneous Daily    ezetimibe  10 mg Oral Daily    furosemide  20 mg Oral Daily    ketorolac 0.5%  1 drop Right Eye TID    prednisoLONE acetate  1 drop Right Eye TID    senna-docusate 8.6-50 mg  1 tablet Oral BID     PRN Meds:acetaminophen, dextrose 50%, dextrose 50%, glucagon (human recombinant), glucose, glucose, influenza, insulin aspart U-100, oxyCODONE-acetaminophen     Objective:     Vital Signs (Most Recent):  Temp: 96.1 °F (35.6 °C) (11/11/18 1113)  Pulse: 79 (11/11/18 1113)  Resp: 18 (11/11/18 1113)  BP: (!) 115/59 (11/11/18 1113)  SpO2: (!) 93 % (11/11/18 1113) Vital Signs (24h Range):  Temp:  [96.1 °F (35.6 °C)-98 °F (36.7 °C)] 96.1 °F (35.6 °C)  Pulse:  [68-85] 79  Resp:  [16-18] 18  SpO2:  [92 %-96 %] 93 %  BP: (114-137)/(56-67) 115/59     Date 11/11/18 0700 - 11/12/18 0659   Shift 8912-1719 6283-6520 4265-9213 24 Hour Total   INTAKE   P.O. 240   240   Shift Total(mL/kg) 240(2.5)   240(2.5)   OUTPUT   Urine(mL/kg/hr) 775   775   Shift Total(mL/kg) 775(8)   775(8)   Weight (kg) 97.3 97.3 97.3 97.3       Physical Exam   Constitutional: He is oriented to person, place, and time. He appears well-developed and well-nourished. No distress.   HENT:   Head: Normocephalic and atraumatic.   Eyes: Conjunctivae are normal.   Neck: Neck supple.   Cardiovascular: Normal rate.   Pulses:       Femoral pulses are 2+ on the right side, and 2+ on the left side.       Dorsalis pedis pulses are Detected w/ doppler on the right side, and Detected w/ doppler on the left side.        Posterior tibial pulses are  Detected w/ doppler on the right side, and Detected w/ doppler on the left side.   Pulmonary/Chest: Effort normal. No respiratory distress. He exhibits no tenderness.   Abdominal: Soft. He exhibits no distension and no mass. There is no tenderness. There is no rebound and no guarding.   Musculoskeletal: Normal range of motion. He exhibits edema. He exhibits no tenderness or deformity.   Feet:   Right Foot:   Skin Integrity: Negative for ulcer, blister, skin breakdown, callus or dry skin.   Left Foot:   Skin Integrity: Positive for ulcer, blister, skin breakdown, callus and dry skin.   Neurological: He is alert and oriented to person, place, and time. No sensory deficit.   Skin: Skin is warm and dry. Capillary refill takes less than 2 seconds. No rash noted. No erythema. No pallor.   Psychiatric: He has a normal mood and affect.   Vitals reviewed.      Significant Labs:  All pertinent labs from the last 24 hours have been reviewed.    Significant Diagnostics:  I have reviewed all pertinent imaging results/findings within the past 24 hours.

## 2018-11-11 NOTE — ASSESSMENT & PLAN NOTE
-L foot wound multifactorial likely due to DM, PVD and venous insufficiency - rec angiography with possible intervention; postponed case due to hyperkalemia and LAYNE; cont optimization with  Nephrology consult if persists   -Cont wound care consult recs  -Cont offloading  -Abx per primary team  -Strict glycemic control

## 2018-11-11 NOTE — PROGRESS NOTES
[facility]  Progress Note    Patient Name: Marvin Ray  MRN: [id number]  Patient Class: IP- Inpatient   Admission Date: 11/8/2018  Length of Stay: 3 days  Attending Physician: Ludivina Rodríguez MD  Primary Care Provider: Rubén Davis MD        Subjective:     Principal Problem:Cellulitis    HPI:  59 yo male with CAD, HLP, hepatosplenomegaly/ascites, DM2 with CKD3 and retinopathy, PVD, gout, chronic combined CHF and chronic BLE wounds L>R presented from Dr. Chan office with concern for nonhealing wound to LLE. He was recently given bactrim and presented today with LAYNE, hyperkalemia and increased pain and edema to LLE. Pt has h/o Group G strep bacteremia (10/10). ICD implanted 11/2.  The patient's sister reports doing dressing changes of LE wounds 5 times a day. Noticeable weeping of fluid from the LE. Pt made NPO by vascular surgery and plan for angiogram and possible stent placement to LE to assist with better LE wound healing.     Hospital Course:  PT admitted with non-healing Left foot wounds. Plan for angiogram  By vascular surgery cancelled due to hyperkalemia/LAYNE.  Pt received kayexalate x 2 and  Potassium persistently high. Add calcium gluconate x one dose and IV lasix. Monitor K levels and plan for possible intervention in next few days if labs improve. Wound care consulted.     11/10- serum potassium elevated, check urine electrolytes; calcium IV given, pt frustrated about being in hospital and not able to ambulate on his own   11/11- persistent hyperkalemia in setting of improving LAYNE; pt received IV lasix, kayexalate x2, calcium gluconate 1500mg; insulin and dextrose today, lasix IV again and albuterol high dose neb x one    Interval History: no acute events overnight     Review of Systems   Constitutional: Negative for chills and fever.   HENT: Negative for congestion.    Eyes: Negative for visual disturbance.   Respiratory: Negative for shortness of  breath.    Cardiovascular: Negative for chest pain.   Gastrointestinal: Negative for abdominal pain.   Endocrine: Negative for polyphagia.   Genitourinary: Negative for dysuria.   Musculoskeletal: Negative for back pain.   Skin: Positive for color change and wound. Negative for pallor and rash.   Allergic/Immunologic: Negative for immunocompromised state.   Neurological: Negative for weakness.   Hematological: Does not bruise/bleed easily.   Psychiatric/Behavioral: Negative for confusion.     Objective:     Vital Signs (Most Recent):  Temp: 96.1 °F (35.6 °C) (11/11/18 1113)  Pulse: 79 (11/11/18 1113)  Resp: 18 (11/11/18 1113)  BP: (!) 115/59 (11/11/18 1113)  SpO2: (!) 93 % (11/11/18 1113) Vital Signs (24h Range):  Temp:  [96.1 °F (35.6 °C)-98 °F (36.7 °C)] 96.1 °F (35.6 °C)  Pulse:  [68-85] 79  Resp:  [16-18] 18  SpO2:  [92 %-96 %] 93 %  BP: (114-137)/(56-67) 115/59     Weight: 97.3 kg (214 lb 8.1 oz)  Body mass index is 30.78 kg/m².    Intake/Output Summary (Last 24 hours) at 11/11/2018 1302  Last data filed at 11/11/2018 0900  Gross per 24 hour   Intake 1850 ml   Output 1075 ml   Net 775 ml      Physical Exam   Constitutional: He is oriented to person, place, and time. He appears well-developed and well-nourished. No distress.   HENT:   Head: Normocephalic and atraumatic.   Eyes: Conjunctivae are normal.   Neck: Neck supple.   Cardiovascular: Normal rate.   Pulses:       Femoral pulses are 2+ on the right side, and 2+ on the left side.       Dorsalis pedis pulses are Detected w/ doppler on the right side, and Detected w/ doppler on the left side.        Posterior tibial pulses are Detected w/ doppler on the right side, and Detected w/ doppler on the left side.   Pulmonary/Chest: Effort normal. No respiratory distress. He exhibits no tenderness.   Abdominal: Soft. He exhibits no distension and no mass. There is no tenderness. There is no rebound and no guarding.   Musculoskeletal: Normal range of motion. He  exhibits edema. He exhibits no tenderness or deformity.   Feet:   Right Foot:   Skin Integrity: Negative for ulcer, blister, skin breakdown, callus or dry skin.   Left Foot:   Skin Integrity: Positive for ulcer, blister, skin breakdown, callus and dry skin.   Neurological: He is alert and oriented to person, place, and time. No sensory deficit.   Skin: Skin is warm and dry. Capillary refill takes less than 2 seconds. No rash noted. No erythema. No pallor.   Psychiatric: He has a normal mood and affect.   Vitals reviewed.      Significant Labs:   BMP:   Recent Labs   Lab 11/11/18  0430   *   *   K 5.8*      CO2 22*   BUN 36*   CREATININE 1.1   CALCIUM 9.1     CBC: No results for input(s): WBC, HGB, HCT, PLT in the last 48 hours.    Significant Imaging: I have reviewed and interpreted all pertinent imaging results/findings within the past 24 hours.    Assessment/Plan:      * Cellulitis    LLE >R; secondary to PVD  Repeat blood cultures  Rocephin IV Q12 based on last blood culture +GroupG strep  Vascular surgery consulted - see PVD  Pain control  Wound care consulted          Hyperkalemia    Secondary to LAYNE and bactrim/aldactone/acei - holding agents  kayexlate given in ED, again x2  Calcium gluconate 500mg IV and then 1 gm followed = 1500mg  Lasix 40mg IV  - insulin and dextrose and high dose nebs   Nephrology consulted for any further recs   Bmp in am        Metabolic acidosis    Secondary to LAYNE  Bicarb given in ED  Bmp in am        Acute kidney injury    Pt has reported baseline CKD3  Recently placed on bactrim in addition to acei and aldactone/lasix - hold offending agents  Gentle IVF  BMP in am  Renal dose antibiotics  Avoid nephrotoxins        Open wound of left foot    Wound care consulted  See above        Chronic combined systolic and diastolic heart failure    No acute issues  Hold aldactone and acei insetting of hyperkalemia and LAYNE   Resume BB  S/p ICD 11/2       Hepatosplenomegaly     Recent paracentesis - negative culture   Monitor CMP             PVD (peripheral vascular disease)    NPo after MN  Angiogram in am  Vascular surgery consulted  Pain control        CAD (coronary artery disease)    No acute issues  Resume aspirin and zetia  Statin intolerance         Essential hypertension      Controlled  Resume amlodipine and BB  Hold aldactone and acei as stated above      DM type 2 with diabetic peripheral neuropathy    Uncontrolled, A1c 8.3%  Basal -bolus insulin   Diabetic diet             VTE Risk Mitigation (From admission, onward)        Ordered     enoxaparin injection 40 mg  Daily      11/08/18 1501     IP VTE HIGH RISK PATIENT  Once      11/08/18 1501              Ludivina Rodríguez MD  Department of Hospital Medicine   [facility]

## 2018-11-12 ENCOUNTER — TELEPHONE (OUTPATIENT)
Dept: ENDOCRINOLOGY | Facility: CLINIC | Age: 58
End: 2018-11-12

## 2018-11-12 LAB
ALBUMIN SERPL BCP-MCNC: 2 G/DL
ALP SERPL-CCNC: 265 U/L
ALT SERPL W/O P-5'-P-CCNC: 19 U/L
ANION GAP SERPL CALC-SCNC: 8 MMOL/L
AST SERPL-CCNC: 17 U/L
BACTERIA #/AREA URNS HPF: ABNORMAL /HPF
BILIRUB SERPL-MCNC: 0.7 MG/DL
BILIRUB UR QL STRIP: NEGATIVE
BUN SERPL-MCNC: 34 MG/DL
CALCIUM SERPL-MCNC: 8.9 MG/DL
CHLORIDE SERPL-SCNC: 108 MMOL/L
CLARITY UR: CLEAR
CO2 SERPL-SCNC: 18 MMOL/L
COLOR UR: YELLOW
CORTIS SERPL-MCNC: 11.5 UG/DL
CORTIS SERPL-MCNC: 23.3 UG/DL
CORTIS SERPL-MCNC: 25.6 UG/DL
CREAT SERPL-MCNC: 1 MG/DL
CREAT UR-MCNC: 110.8 MG/DL
EST. GFR  (AFRICAN AMERICAN): >60 ML/MIN/1.73 M^2
EST. GFR  (NON AFRICAN AMERICAN): >60 ML/MIN/1.73 M^2
GLUCOSE SERPL-MCNC: 190 MG/DL
GLUCOSE UR QL STRIP: NEGATIVE
HGB UR QL STRIP: ABNORMAL
HYALINE CASTS #/AREA URNS LPF: ABNORMAL /LPF
KETONES UR QL STRIP: NEGATIVE
LEUKOCYTE ESTERASE UR QL STRIP: NEGATIVE
MICROSCOPIC COMMENT: ABNORMAL
NITRITE UR QL STRIP: NEGATIVE
PH UR STRIP: 5 [PH] (ref 5–8)
POCT GLUCOSE: 173 MG/DL (ref 70–110)
POCT GLUCOSE: 193 MG/DL (ref 70–110)
POCT GLUCOSE: 215 MG/DL (ref 70–110)
POCT GLUCOSE: 221 MG/DL (ref 70–110)
POTASSIUM SERPL-SCNC: 4.9 MMOL/L
PROT SERPL-MCNC: 7.2 G/DL
PROT UR QL STRIP: ABNORMAL
PROT UR-MCNC: 71 MG/DL
PROT/CREAT UR: 0.64 MG/G{CREAT}
RBC #/AREA URNS HPF: 5 /HPF (ref 0–4)
SODIUM SERPL-SCNC: 134 MMOL/L
SP GR UR STRIP: 1.02 (ref 1–1.03)
URN SPEC COLLECT METH UR: ABNORMAL
UROBILINOGEN UR STRIP-ACNC: ABNORMAL EU/DL
WBC #/AREA URNS HPF: 1 /HPF (ref 0–5)

## 2018-11-12 PROCEDURE — 36415 COLL VENOUS BLD VENIPUNCTURE: CPT

## 2018-11-12 PROCEDURE — 82570 ASSAY OF URINE CREATININE: CPT

## 2018-11-12 PROCEDURE — 63600175 PHARM REV CODE 636 W HCPCS: Performed by: EMERGENCY MEDICINE

## 2018-11-12 PROCEDURE — 21400001 HC TELEMETRY ROOM

## 2018-11-12 PROCEDURE — 99233 SBSQ HOSP IP/OBS HIGH 50: CPT | Mod: ,,, | Performed by: SURGERY

## 2018-11-12 PROCEDURE — 80053 COMPREHEN METABOLIC PANEL: CPT

## 2018-11-12 PROCEDURE — 25000003 PHARM REV CODE 250: Performed by: INTERNAL MEDICINE

## 2018-11-12 PROCEDURE — 94761 N-INVAS EAR/PLS OXIMETRY MLT: CPT

## 2018-11-12 PROCEDURE — 25000003 PHARM REV CODE 250: Performed by: EMERGENCY MEDICINE

## 2018-11-12 PROCEDURE — 25000003 PHARM REV CODE 250: Performed by: HOSPITALIST

## 2018-11-12 RX ORDER — AMLODIPINE BESYLATE 5 MG/1
TABLET ORAL
Qty: 30 TABLET | Refills: 0 | Status: ON HOLD | OUTPATIENT
Start: 2018-11-12 | End: 2018-12-19 | Stop reason: HOSPADM

## 2018-11-12 RX ADMIN — DORZOLAMIDE HYDROCHLORIDE AND TIMOLOL MALEATE 1 DROP: 20; 5 SOLUTION/ DROPS OPHTHALMIC at 09:11

## 2018-11-12 RX ADMIN — EZETIMIBE 10 MG: 10 TABLET ORAL at 09:11

## 2018-11-12 RX ADMIN — FUROSEMIDE 20 MG: 20 TABLET ORAL at 09:11

## 2018-11-12 RX ADMIN — LACTULOSE 10 G: 20 SOLUTION ORAL at 09:11

## 2018-11-12 RX ADMIN — BRIMONIDINE TARTRATE 1 DROP: 1 SOLUTION/ DROPS OPHTHALMIC at 09:11

## 2018-11-12 RX ADMIN — INSULIN ASPART 2 UNITS: 100 INJECTION, SOLUTION INTRAVENOUS; SUBCUTANEOUS at 12:11

## 2018-11-12 RX ADMIN — ENOXAPARIN SODIUM 40 MG: 100 INJECTION SUBCUTANEOUS at 04:11

## 2018-11-12 RX ADMIN — PREDNISOLONE ACETATE 1 DROP: 10 SUSPENSION OPHTHALMIC at 03:11

## 2018-11-12 RX ADMIN — STANDARDIZED SENNA CONCENTRATE AND DOCUSATE SODIUM 1 TABLET: 8.6; 5 TABLET, FILM COATED ORAL at 09:11

## 2018-11-12 RX ADMIN — OXYCODONE HYDROCHLORIDE AND ACETAMINOPHEN 1 TABLET: 7.5; 325 TABLET ORAL at 04:11

## 2018-11-12 RX ADMIN — INSULIN ASPART 2 UNITS: 100 INJECTION, SOLUTION INTRAVENOUS; SUBCUTANEOUS at 09:11

## 2018-11-12 RX ADMIN — CARVEDILOL 6.25 MG: 6.25 TABLET, FILM COATED ORAL at 09:11

## 2018-11-12 RX ADMIN — BRIMONIDINE TARTRATE 1 DROP: 1 SOLUTION/ DROPS OPHTHALMIC at 03:11

## 2018-11-12 RX ADMIN — AMLODIPINE BESYLATE 5 MG: 5 TABLET ORAL at 09:11

## 2018-11-12 RX ADMIN — SODIUM BICARBONATE 650 MG: 325 TABLET ORAL at 09:11

## 2018-11-12 RX ADMIN — CEFTRIAXONE 1 G: 1 INJECTION, SOLUTION INTRAVENOUS at 12:11

## 2018-11-12 RX ADMIN — SODIUM CHLORIDE: 0.9 INJECTION, SOLUTION INTRAVENOUS at 05:11

## 2018-11-12 RX ADMIN — PREDNISOLONE ACETATE 1 DROP: 10 SUSPENSION OPHTHALMIC at 09:11

## 2018-11-12 RX ADMIN — ASPIRIN 81 MG 81 MG: 81 TABLET ORAL at 09:11

## 2018-11-12 RX ADMIN — COLCHICINE 0.3 MG: 0.6 TABLET, FILM COATED ORAL at 09:11

## 2018-11-12 NOTE — PROGRESS NOTES
" [facility]  Adult Nutrition  Progress Note    SUMMARY       Recommendations    1. Continue ADA diet;   -remove renal restriction with LAYNE resolution   2. Consider Brandon bid to aid with wound healing.  3. RD to monitor progress    Goals: Meet >85% EEN  Nutrition Goal Status: new  Communication of RD Recs: reviewed with RN(plan of care)    Nutrition Discharge Planning: ADA diet to meet estimated needs.    Reason for Assessment    Reason for Assessment: identified at risk by screening criteria  Diagnosis: (Cellulitis)  Relevant Medical History: DM, HTN, HLD, gout, CKD, CAD    General Information Comments: Pt denies issues with n/v/chewing or swallowing. Reports good appetite, denies wt loss PTA. Tolerating % of meals. NFPE: adequate/excess fat stores; adequate LBM stores. Pt and family deny desire for further DM education material and review. Discussed importance of good BG control to aid with healing. Pt and family aware.       Nutrition Risk Screen    Nutrition Risk Screen: large or nonhealing wound, burn or pressure ulcer    Nutrition/Diet History    Food Preferences: Denies  Do you have any cultural, spiritual, Gnosticist conflicts, given your current situation?: no  Food Allergies: NKFA  Factors Affecting Nutritional Intake: None identified at this time    Anthropometrics    Temp: 98.6 °F (37 °C)  Height Method: Stated  Height: 5' 10" (177.8 cm)  Height (inches): 70 in  Weight Method: Bed Scale  Weight: 97.3 kg (214 lb 8.1 oz)  Weight (lb): 214.51 lb  Ideal Body Weight (IBW), Male: 166 lb  % Ideal Body Weight, Male (lb): 129.22 lb  BMI (Calculated): 30.8  BMI Grade: 30 - 34.9- obesity - grade I       Lab/Procedures/Meds    Pertinent Labs Reviewed: reviewed  Pertinent Labs Comments: HgbA1c from 10/30/2018: 8.3%  Pertinent Medications Reviewed: reviewed  Pertinent Medications Comments: abx, furosemide, lactulose    Physical Findings/Assessment    Overall Physical Appearance: " obese  Oral/Mouth Cavity: tooth/teeth missing  Skin: non-healing wound(s)(DM ulcer x 3)    Estimated/Assessed Needs    Weight Used For Calorie Calculations: 97.3 kg (214 lb 8.1 oz)  Energy Calorie Requirements (kcal): 1800 kcal (RMR for obesity)  Energy Need Method: McPherson-St Jeor  Protein Requirements: 97g (1.0g/kg)  Weight Used For Protein Calculations: 97.3 kg (214 lb 8.1 oz)     Fluid Need Method: RDA Method  RDA Method (mL): 1800  CHO Requirement: 225g      Nutrition Prescription Ordered    Current Diet Order: ADA/Renal    Evaluation of Received Nutrient/Fluid Intake    I/O: reviewed  Energy Calories Required: meeting needs  Protein Required: meeting needs  Fluid Required: (per MD)  Comments: LBM: 11/10  Tolerance: tolerating  % Intake of Estimated Energy Needs: 75 - 100 %  % Meal Intake: 75 - 100 %    Nutrition Risk    Level of Risk/Frequency of Follow-up: (1 x week)     Assessment and Plan    Nutrition Problem  Increased nutrient needs    Related to (etiology):   Physiological needs with healing    Signs and Symptoms (as evidenced by):   Multiple foot wounds;     Interventions:  Consistent CHO diet  Commercial Beverage (low CHO)    Nutrition Diagnosis Status:   New     Monitor and Evaluation    Food and Nutrient Intake: energy intake, food and beverage intake  Food and Nutrient Adminstration: diet order  Knowledge/Beliefs/Attitudes: food and nutrition knowledge/skill  Physical Activity and Function: nutrition-related ADLs and IADLs  Anthropometric Measurements: weight, weight change  Biochemical Data, Medical Tests and Procedures: electrolyte and renal panel, glucose/endocrine profile  Nutrition-Focused Physical Findings: overall appearance     Nutrition Follow-Up    RD Follow-up?: Yes

## 2018-11-12 NOTE — PROGRESS NOTES
[facility] Medicine  Progress Note    Patient Name: Marvin Ray  MRN: [id number]  Patient Class: IP- Inpatient   Admission Date: 11/8/2018  Length of Stay: 4 days  Attending Physician: Ludivina Rodríguez MD  Primary Care Provider: Rubén Davis MD        Subjective:     Principal Problem:Cellulitis    HPI:  57 yo male with CAD, HLP, hepatosplenomegaly/ascites, DM2 with CKD3 and retinopathy, PVD, gout, chronic combined CHF and chronic BLE wounds L>R presented from Dr. Chan office with concern for nonhealing wound to LLE. He was recently given bactrim and presented today with LAYNE, hyperkalemia and increased pain and edema to LLE. Pt has h/o Group G strep bacteremia (10/10). ICD implanted 11/2.  The patient's sister reports doing dressing changes of LE wounds 5 times a day. Noticeable weeping of fluid from the LE. Pt made NPO by vascular surgery and plan for angiogram and possible stent placement to LE to assist with better LE wound healing.     Hospital Course:  PT admitted with non-healing Left foot wounds. Plan for angiogram  By vascular surgery cancelled due to hyperkalemia/LAYNE.  Pt received kayexalate x 2 and  Potassium persistently high. Add calcium gluconate x one dose and IV lasix. Monitor K levels and plan for possible intervention in next few days if labs improve. Wound care consulted.     11/10- serum potassium elevated, check urine electrolytes; calcium IV given, pt frustrated about being in hospital and not able to ambulate on his own   11/11- persistent hyperkalemia in setting of improving LAYNE; pt received IV lasix, kayexalate x2, calcium gluconate 1500mg; insulin and dextrose today, lasix IV again and albuterol high dose neb x one  11/12- potassium 4.9; nephrology added sodium bicarb; vascular surgery planning intervention with improved labs     Interval History: pt has no new complaints     Review of Systems   Constitutional: Negative for chills and fever.   HENT:  Negative for congestion.    Eyes: Negative for visual disturbance.   Respiratory: Negative for shortness of breath.    Cardiovascular: Negative for chest pain.   Gastrointestinal: Negative for abdominal pain.   Endocrine: Negative for polyphagia.   Genitourinary: Negative for dysuria.   Musculoskeletal: Negative for back pain.   Skin: Positive for color change and wound. Negative for pallor and rash.   Allergic/Immunologic: Negative for immunocompromised state.   Neurological: Negative for weakness.   Hematological: Does not bruise/bleed easily.   Psychiatric/Behavioral: Negative for confusion.     Objective:     Vital Signs (Most Recent):  Temp: 98.7 °F (37.1 °C) (11/12/18 1113)  Pulse: 90 (11/12/18 1113)  Resp: 20 (11/12/18 1113)  BP: 128/69 (11/12/18 1113)  SpO2: 95 % (11/12/18 1113) Vital Signs (24h Range):  Temp:  [97.6 °F (36.4 °C)-98.7 °F (37.1 °C)] 98.7 °F (37.1 °C)  Pulse:  [76-90] 90  Resp:  [18-20] 20  SpO2:  [90 %-100 %] 95 %  BP: (122-164)/(58-69) 128/69     Weight: 97.3 kg (214 lb 8.1 oz)  Body mass index is 30.78 kg/m².    Intake/Output Summary (Last 24 hours) at 11/12/2018 1540  Last data filed at 11/12/2018 0538  Gross per 24 hour   Intake 697.5 ml   Output 250 ml   Net 447.5 ml      Physical Exam   Constitutional: He is oriented to person, place, and time. He appears well-developed and well-nourished. No distress.   HENT:   Head: Normocephalic and atraumatic.   Eyes: Conjunctivae are normal.   Neck: Neck supple.   Cardiovascular: Normal rate.   Pulses:       Femoral pulses are 2+ on the right side, and 2+ on the left side.       Dorsalis pedis pulses are Detected w/ doppler on the right side, and Detected w/ doppler on the left side.        Posterior tibial pulses are Detected w/ doppler on the right side, and Detected w/ doppler on the left side.   Pulmonary/Chest: Effort normal. No respiratory distress. He exhibits no tenderness.   Abdominal: Soft. He exhibits no distension and no mass. There is  no tenderness. There is no rebound and no guarding.   Musculoskeletal: Normal range of motion. He exhibits edema. He exhibits no tenderness or deformity.   Feet:   Right Foot:   Skin Integrity: Negative for ulcer, blister, skin breakdown, callus or dry skin.   Left Foot:   Skin Integrity: Positive for ulcer, blister, skin breakdown, callus and dry skin.   Neurological: He is alert and oriented to person, place, and time. No sensory deficit.   Skin: Skin is warm and dry. Capillary refill takes less than 2 seconds. No rash noted. No erythema. No pallor.   Psychiatric: He has a normal mood and affect.   Vitals reviewed.      Significant Labs:   BMP:   Recent Labs   Lab 11/12/18  0451   *   *   K 4.9      CO2 18*   BUN 34*   CREATININE 1.0   CALCIUM 8.9       Significant Imaging: I have reviewed and interpreted all pertinent imaging results/findings within the past 24 hours.    Assessment/Plan:      * Cellulitis    LLE >R; secondary to PVD  Repeat blood cultures  Rocephin IV Q12 based on last blood culture +GroupG strep  Vascular surgery consulted - see PVD  Pain control  Wound care consulted          Hyperkalemia    Secondary to LAYNE and bactrim/aldactone/acei - holding agents  kayexlate given in ED, again x2  Calcium gluconate 500mg IV and then 1 gm followed = 1500mg  Lasix 40mg IV  - insulin and dextrose and high dose nebs   Nephrology consulted for any further recs - oral sodium bicarb added x 6 days  Bmp in am     Potassium improved and down 4.9.       Metabolic acidosis    Secondary to LAYNE  Bicarb given in ED, oral bicarb   Bmp in am        Acute kidney injury    Pt has reported baseline CKD3  Recently placed on bactrim in addition to acei and aldactone/lasix - hold offending agents  Gentle IVF  BMP in am  Renal dose antibiotics  Avoid nephrotoxins        Open wound of left foot    Wound care consulted  See above        Chronic combined systolic and diastolic heart failure    No acute  issues  Hold aldactone and acei insetting of hyperkalemia and LAYNE   Resume BB  S/p ICD 11/2       Hepatosplenomegaly    Recent paracentesis - negative culture   Monitor CMP             PVD (peripheral vascular disease)      Angiogram pending   Vascular surgery consulted  Pain control        CAD (coronary artery disease)    No acute issues  Resume aspirin and zetia  Statin intolerance         Essential hypertension      Controlled  Resume amlodipine and BB  Hold aldactone and acei as stated above      DM type 2 with diabetic peripheral neuropathy    Uncontrolled, A1c 8.3%  Basal -bolus insulin   Diabetic diet             VTE Risk Mitigation (From admission, onward)        Ordered     enoxaparin injection 40 mg  Daily      11/08/18 1501     IP VTE HIGH RISK PATIENT  Once      11/08/18 1501              Ludivina Rodríguez MD  Department of Hospital Medicine   [facility]

## 2018-11-12 NOTE — PROGRESS NOTES
Nursing continues frequent dressing changes to left foot. For Vascular intervention tomorrow. Patient reports less drainage from left foot. Will assist with wound management.

## 2018-11-12 NOTE — SUBJECTIVE & OBJECTIVE
Medications:  Continuous Infusions:   sodium chloride 0.9% 50 mL/hr at 11/12/18 0538     Scheduled Meds:   amLODIPine  5 mg Oral Daily    aspirin  81 mg Oral Daily    brimonidine 0.1%  1 drop Both Eyes TID    carvedilol  6.25 mg Oral BID    cefTRIAXone (ROCEPHIN) IVPB  1 g Intravenous Q12H    colchicine  0.3 mg Oral Daily    dorzolamide-timolol 2-0.5%  1 drop Both Eyes BID    enoxaparin  40 mg Subcutaneous Daily    ezetimibe  10 mg Oral Daily    furosemide  20 mg Oral Daily    lactulose  10 g Oral BID    prednisoLONE acetate  1 drop Right Eye TID    senna-docusate 8.6-50 mg  1 tablet Oral BID    sodium bicarbonate  650 mg Oral BID     PRN Meds:acetaminophen, dextrose 50%, dextrose 50%, glucagon (human recombinant), glucose, glucose, influenza, insulin aspart U-100, oxyCODONE-acetaminophen     Objective:     Vital Signs (Most Recent):  Temp: 98.6 °F (37 °C) (11/12/18 0643)  Pulse: 89 (11/12/18 0643)  Resp: 18 (11/12/18 0643)  BP: (!) 122/58 (11/12/18 0643)  SpO2: (!) 90 % (11/12/18 0643) Vital Signs (24h Range):  Temp:  [96.1 °F (35.6 °C)-98.6 °F (37 °C)] 98.6 °F (37 °C)  Pulse:  [76-89] 89  Resp:  [18-20] 18  SpO2:  [90 %-100 %] 90 %  BP: (115-164)/(58-69) 122/58          Physical Exam   Constitutional: He is oriented to person, place, and time. He appears well-developed and well-nourished. No distress.   HENT:   Head: Normocephalic and atraumatic.   Eyes: Conjunctivae are normal.   Neck: Neck supple.   Cardiovascular: Normal rate.   Pulses:       Femoral pulses are 2+ on the right side, and 2+ on the left side.       Dorsalis pedis pulses are Detected w/ doppler on the right side, and Detected w/ doppler on the left side.        Posterior tibial pulses are Detected w/ doppler on the right side, and Detected w/ doppler on the left side.   Pulmonary/Chest: Effort normal. No respiratory distress. He exhibits no tenderness.   Abdominal: Soft. He exhibits no distension and no mass. There is no  tenderness. There is no rebound and no guarding.   Musculoskeletal: Normal range of motion. He exhibits no edema, tenderness or deformity.   Feet:   Right Foot:   Skin Integrity: Negative for ulcer, blister, skin breakdown, callus or dry skin.   Left Foot:   Skin Integrity: Positive for ulcer, blister, skin breakdown, callus and dry skin.   Neurological: He is alert and oriented to person, place, and time. No sensory deficit.   Skin: Skin is warm and dry. Capillary refill takes less than 2 seconds. No rash noted. No erythema. No pallor.   Psychiatric: He has a normal mood and affect.   Vitals reviewed.      Significant Labs:  All pertinent labs from the last 24 hours have been reviewed.    Significant Diagnostics:  I have reviewed all pertinent imaging results/findings within the past 24 hours.

## 2018-11-12 NOTE — NURSING
Received report from night nurse. Pt has no sign of distress. Safety precautions are maintained with bed alarm set, bed in lowest position, bed wheels locked, and call light in reach.Pt has sister at bedside .

## 2018-11-12 NOTE — PROGRESS NOTES
Awake alert oriented NAD    Denies CNS ENT CP GI  RHEUM OR DERM SX  Past Medical History:   Diagnosis Date    Arthritis     Cellulitis     CKD (chronic kidney disease), stage III     Coronary artery disease     Diabetes mellitus     Diabetic retinopathy     Diabetic ulcer of left foot     Glaucoma     Gout     Hyperlipemia     Hypertension     ICD (implantable cardioverter-defibrillator) in place 11/02/2018    Left chest    Non-pressure chronic ulcer of other part of left foot with fat layer exposed 10/23/2018    PVD (peripheral vascular disease)     Type 2 diabetes mellitus with left diabetic foot ulcer 10/29/2018    Unsteady gait     uses a wheelchair     Review of patient's allergies indicates:   Allergen Reactions    Statins-hmg-coa reductase inhibitors      Generalized Pain    Onglyza [saxagliptin]     Penicillins Rash       Current Facility-Administered Medications   Medication    0.9%  NaCl infusion    acetaminophen tablet 650 mg    amLODIPine tablet 5 mg    aspirin chewable tablet 81 mg    brimonidine (ALPHAGAN P) ophthalmic solution 0.1%    carvedilol tablet 6.25 mg    cefTRIAXone (ROCEPHIN) 1 g in dextrose 5 % 50 mL IVPB    colchicine split tablet 0.3 mg    dextrose 50% injection 12.5 g    dextrose 50% injection 25 g    dorzolamide-timolol 2-0.5% ophthalmic solution 1 drop    enoxaparin injection 40 mg    ezetimibe tablet 10 mg    furosemide tablet 20 mg    glucagon (human recombinant) injection 1 mg    glucose chewable tablet 16 g    glucose chewable tablet 24 g    influenza (FLUZONE QUADRIVALENT) vaccine 0.5 mL    insulin aspart U-100 pen 0-5 Units    lactulose 20 gram/30 mL solution Soln 10 g    oxyCODONE-acetaminophen 7.5-325 mg per tablet 1 tablet    prednisoLONE acetate 1 % ophthalmic suspension 1 drop    senna-docusate 8.6-50 mg per tablet 1 tablet    sodium bicarbonate tablet 650 mg       LABS    Recent Results (from the past 24 hour(s))   POCT glucose     Collection Time: 11/11/18 11:10 AM   Result Value Ref Range    POCT Glucose 161 (H) 70 - 110 mg/dL   POCT glucose    Collection Time: 11/11/18  3:11 PM   Result Value Ref Range    POCT Glucose 233 (H) 70 - 110 mg/dL   POCT glucose    Collection Time: 11/11/18  4:04 PM   Result Value Ref Range    POCT Glucose 191 (H) 70 - 110 mg/dL   POCT glucose    Collection Time: 11/11/18  8:14 PM   Result Value Ref Range    POCT Glucose 206 (H) 70 - 110 mg/dL   Cortisol, Random    Collection Time: 11/11/18  9:05 PM   Result Value Ref Range    Cortisol 11.50 ug/dL   Ammonia    Collection Time: 11/11/18  9:05 PM   Result Value Ref Range    Ammonia 35 10 - 50 umol/L   Cortisol    Collection Time: 11/11/18  9:35 PM   Result Value Ref Range    Cortisol 23.30 ug/dL   Cortisol    Collection Time: 11/11/18 10:04 PM   Result Value Ref Range    Cortisol 25.60 ug/dL   Urinalysis    Collection Time: 11/11/18 11:38 PM   Result Value Ref Range    Specimen UA Urine, Clean Catch     Color, UA Yellow Yellow, Straw, Arielle    Appearance, UA Clear Clear    pH, UA 5.0 5.0 - 8.0    Specific Gravity, UA 1.020 1.005 - 1.030    Protein, UA 1+ (A) Negative    Glucose, UA Negative Negative    Ketones, UA Negative Negative    Bilirubin (UA) Negative Negative    Occult Blood UA 1+ (A) Negative    Nitrite, UA Negative Negative    Urobilinogen, UA 2.0-3.0 (A) <2.0 EU/dL    Leukocytes, UA Negative Negative   Urinalysis Microscopic    Collection Time: 11/11/18 11:38 PM   Result Value Ref Range    RBC, UA 5 (H) 0 - 4 /hpf    WBC, UA 1 0 - 5 /hpf    Bacteria, UA Occasional None-Occ /hpf    Hyaline Casts, UA none 0-1/lpf /lpf    Microscopic Comment SEE COMMENT    Comprehensive metabolic panel    Collection Time: 11/12/18  4:51 AM   Result Value Ref Range    Sodium 134 (L) 136 - 145 mmol/L    Potassium 4.9 3.5 - 5.1 mmol/L    Chloride 108 95 - 110 mmol/L    CO2 18 (L) 23 - 29 mmol/L    Glucose 190 (H) 70 - 110 mg/dL    BUN, Bld 34 (H) 6 - 20 mg/dL    Creatinine  1.0 0.5 - 1.4 mg/dL    Calcium 8.9 8.7 - 10.5 mg/dL    Total Protein 7.2 6.0 - 8.4 g/dL    Albumin 2.0 (L) 3.5 - 5.2 g/dL    Total Bilirubin 0.7 0.1 - 1.0 mg/dL    Alkaline Phosphatase 265 (H) 55 - 135 U/L    AST 17 10 - 40 U/L    ALT 19 10 - 44 U/L    Anion Gap 8 8 - 16 mmol/L    eGFR if African American >60 >60 mL/min/1.73 m^2    eGFR if non African American >60 >60 mL/min/1.73 m^2   ]    I/O last 3 completed shifts:  In: 2187.5 [P.O.:600; I.V.:1537.5; IV Piggyback:50]  Out: 1325 [Urine:1325]    Vitals:    11/11/18 1930 11/11/18 2014 11/11/18 2354 11/12/18 0643   BP:  124/69 (!) 164/69 (!) 122/58   Pulse:  81 89 89   Resp:  18 18 18   Temp:  97.8 °F (36.6 °C) 98.2 °F (36.8 °C) 98.6 °F (37 °C)   TempSrc:  Oral Oral Oral   SpO2: 95% (!) 93% (!) 92% (!) 90%   Weight:       Height:           No Jvd, Thyromegaly or Lymphadenopathy  Lungs: Fairly clear anteriorly and laterally  Cor: RRR no G or rubs  Abd: Soft benign good bowel sounds non tender  Ext: Pos edema    A)  SP william corrected  CKD2 or 3  Urinary retention of 576 ml would consider uro eval  Long term dm with neuropathy and proteinuria, will quantify  HTN  PVD  Elevated WBC  Anemia   Mild hyponatremia  Recent resistant hyperK  Met acidosis (NAGMA)  Elevated ALK phosp   Severe protein malnutrition  Probable hypercalcemia  Arthritis/Gout  Ascites  Hepato splenomegaly  Pleural effusion  Ammonia 35    Cortrosyn stim negative ( 11.5, 23.30, 25.60)

## 2018-11-12 NOTE — ASSESSMENT & PLAN NOTE
Secondary to LAYNE and bactrim/aldactone/acei - holding agents  kayexlate given in ED, again x2  Calcium gluconate 500mg IV and then 1 gm followed = 1500mg  Lasix 40mg IV  - insulin and dextrose and high dose nebs   Nephrology consulted for any further recs - oral sodium bicarb added x 6 days  Bmp in am     Potassium improved and down 4.9.

## 2018-11-12 NOTE — CONSULTS
Pt follows with Dr. Greer s/p ICD placement last week with follow up planned on 11/14/18.  No current cardiac sxs and no ICD discharges.  No prohibitive cardiac contraindication to planned angiogram.  Follow up with Dr. Greer as planned.  No further cardiac testing planned at this juncture.  Cardiology will sign off, pls call with questions.

## 2018-11-12 NOTE — PLAN OF CARE
Problem: Fall Risk (Adult)  Intervention: Safety Promotion/Fall Prevention   11/11/18 1930   Safety Interventions   Safety Promotion/Fall Prevention assistive device/personal item within reach;bed alarm set;diversional activities provided;Fall Risk reviewed with patient/family;medications reviewed;lighting adjusted;nonskid shoes/socks when out of bed;room near unit station;side rails raised x 2;instructed to call staff for mobility

## 2018-11-12 NOTE — PLAN OF CARE
Problem: Skin Integrity Impairment, Risk/Actual (Adult)  Intervention: Prevent/Minimize Sheer/Friction Injuries   11/11/18 1930 11/12/18 0211   Skin Interventions   Pressure Reduction Devices Pressure-redistributing mattress utilized --    Pressure Reduction Techniques frequent weight shift encouraged;positioned off wounds;heels elevated off bed;sit time limited to 2 hours --    Positioning   Positioning/Transfer Devices --  pillows;in use

## 2018-11-12 NOTE — PLAN OF CARE
Recommendations     1. Continue ADA diet;   -remove renal restriction with LAYNE resolution   2. Consider Brandon bid to aid with wound healing.  3. RD to monitor progress     Goals: Meet >85% EEN  Nutrition Goal Status: new  Communication of RD Recs: reviewed with RN(plan of care)     Nutrition Discharge Planning: ADA diet to meet estimated needs.

## 2018-11-12 NOTE — SUBJECTIVE & OBJECTIVE
Interval History: pt has no new complaints     Review of Systems   Constitutional: Negative for chills and fever.   HENT: Negative for congestion.    Eyes: Negative for visual disturbance.   Respiratory: Negative for shortness of breath.    Cardiovascular: Negative for chest pain.   Gastrointestinal: Negative for abdominal pain.   Endocrine: Negative for polyphagia.   Genitourinary: Negative for dysuria.   Musculoskeletal: Negative for back pain.   Skin: Positive for color change and wound. Negative for pallor and rash.   Allergic/Immunologic: Negative for immunocompromised state.   Neurological: Negative for weakness.   Hematological: Does not bruise/bleed easily.   Psychiatric/Behavioral: Negative for confusion.     Objective:     Vital Signs (Most Recent):  Temp: 98.7 °F (37.1 °C) (11/12/18 1113)  Pulse: 90 (11/12/18 1113)  Resp: 20 (11/12/18 1113)  BP: 128/69 (11/12/18 1113)  SpO2: 95 % (11/12/18 1113) Vital Signs (24h Range):  Temp:  [97.6 °F (36.4 °C)-98.7 °F (37.1 °C)] 98.7 °F (37.1 °C)  Pulse:  [76-90] 90  Resp:  [18-20] 20  SpO2:  [90 %-100 %] 95 %  BP: (122-164)/(58-69) 128/69     Weight: 97.3 kg (214 lb 8.1 oz)  Body mass index is 30.78 kg/m².    Intake/Output Summary (Last 24 hours) at 11/12/2018 1540  Last data filed at 11/12/2018 0538  Gross per 24 hour   Intake 697.5 ml   Output 250 ml   Net 447.5 ml      Physical Exam   Constitutional: He is oriented to person, place, and time. He appears well-developed and well-nourished. No distress.   HENT:   Head: Normocephalic and atraumatic.   Eyes: Conjunctivae are normal.   Neck: Neck supple.   Cardiovascular: Normal rate.   Pulses:       Femoral pulses are 2+ on the right side, and 2+ on the left side.       Dorsalis pedis pulses are Detected w/ doppler on the right side, and Detected w/ doppler on the left side.        Posterior tibial pulses are Detected w/ doppler on the right side, and Detected w/ doppler on the left side.   Pulmonary/Chest: Effort  normal. No respiratory distress. He exhibits no tenderness.   Abdominal: Soft. He exhibits no distension and no mass. There is no tenderness. There is no rebound and no guarding.   Musculoskeletal: Normal range of motion. He exhibits edema. He exhibits no tenderness or deformity.   Feet:   Right Foot:   Skin Integrity: Negative for ulcer, blister, skin breakdown, callus or dry skin.   Left Foot:   Skin Integrity: Positive for ulcer, blister, skin breakdown, callus and dry skin.   Neurological: He is alert and oriented to person, place, and time. No sensory deficit.   Skin: Skin is warm and dry. Capillary refill takes less than 2 seconds. No rash noted. No erythema. No pallor.   Psychiatric: He has a normal mood and affect.   Vitals reviewed.      Significant Labs:   BMP:   Recent Labs   Lab 11/12/18  0451   *   *   K 4.9      CO2 18*   BUN 34*   CREATININE 1.0   CALCIUM 8.9       Significant Imaging: I have reviewed and interpreted all pertinent imaging results/findings within the past 24 hours.

## 2018-11-12 NOTE — PLAN OF CARE
Patient scheduled for an aortogram today.  Patient plans to return home and resume care with [facility] when ready for discharge.       11/12/18 1142   Discharge Reassessment   Assessment Type Discharge Planning Reassessment   Provided patient/caregiver education on the expected discharge date and the discharge plan Yes   Discharge Plan A Home;Home with family;Home Health   Discharge Plan B Home with family;Home;Home Health   Anticipated Discharge Disposition Home-Health   Can the patient answer the patient profile reliably? Yes, cognitively intact   How does the patient rate their overall health at the present time? Fair   Describe the patient's ability to walk at the present time. Walks with the help of equipment   How often would a person be available to care for the patient? Whenever needed   Number of comorbid conditions (as recorded on the chart) Five or more   During the past month, has the patient often been bothered by feeling down, depressed or hopeless? No   During the past month, has the patient often been bothered by little interest or pleasure in doing things? No

## 2018-11-12 NOTE — CONSULTS
Pleasant but unfortunate 58 y o male with hx of dm and htn also cad and pvd with R foot necrotic toes who has had william and hyperK. Renal consult requested to help with evaluation and treatment of his hyperk which seems to be resistant to tx.    Has never had problems with his kidneys or with K like this. Denies taking meds not rx to him here at the hospital also denies eating outside his hospital diet.    Denies CNS ENT CP GI  RHEUM OR DERM SX  Past Medical History:   Diagnosis Date    Arthritis     Cellulitis     CKD (chronic kidney disease), stage III     Coronary artery disease     Diabetes mellitus     Diabetic retinopathy     Diabetic ulcer of left foot     Glaucoma     Gout     Hyperlipemia     Hypertension     ICD (implantable cardioverter-defibrillator) in place 11/02/2018    Left chest    Non-pressure chronic ulcer of other part of left foot with fat layer exposed 10/23/2018    PVD (peripheral vascular disease)     Type 2 diabetes mellitus with left diabetic foot ulcer 10/29/2018    Unsteady gait     uses a wheelchair     Family History   Problem Relation Age of Onset    Diabetes Mother     Heart disease Brother     No Known Problems Father     No Known Problems Sister     No Known Problems Maternal Aunt     No Known Problems Maternal Uncle     No Known Problems Paternal Aunt     No Known Problems Paternal Uncle     No Known Problems Maternal Grandmother     No Known Problems Maternal Grandfather     No Known Problems Paternal Grandmother     No Known Problems Paternal Grandfather     Anemia Neg Hx     Arrhythmia Neg Hx     Asthma Neg Hx     Clotting disorder Neg Hx     Fainting Neg Hx     Heart attack Neg Hx     Heart failure Neg Hx     Hyperlipidemia Neg Hx     Hypertension Neg Hx     Stroke Neg Hx     Atrial Septal Defect Neg Hx     Amblyopia Neg Hx     Blindness Neg Hx     Glaucoma Neg Hx     Macular degeneration Neg Hx     Retinal detachment Neg Hx      Strabismus Neg Hx      Social History     Socioeconomic History    Marital status: Legally      Spouse name: Not on file    Number of children: Not on file    Years of education: 14    Highest education level: Not on file   Social Needs    Financial resource strain: Not on file    Food insecurity - worry: Not on file    Food insecurity - inability: Not on file    Transportation needs - medical: Not on file    Transportation needs - non-medical: Not on file   Occupational History    Not on file   Tobacco Use    Smoking status: Former Smoker     Packs/day: 1.00     Years: 3.00     Pack years: 3.00     Last attempt to quit: 1984     Years since quittin.3    Smokeless tobacco: Never Used   Substance and Sexual Activity    Alcohol use: Yes     Alcohol/week: 0.6 oz     Types: 1 Cans of beer per week     Comment: Occasional    Drug use: No    Sexual activity: Not Currently   Other Topics Concern    Not on file   Social History Narrative    Not on file       Review of patient's allergies indicates:   Allergen Reactions    Statins-hmg-coa reductase inhibitors      Generalized Pain    Onglyza [saxagliptin]     Penicillins Rash       Current Facility-Administered Medications   Medication    0.9%  NaCl infusion    acetaminophen tablet 650 mg    allopurinol tablet 200 mg    amLODIPine tablet 5 mg    aspirin chewable tablet 81 mg    brimonidine (ALPHAGAN P) ophthalmic solution 0.1%    carvedilol tablet 6.25 mg    cefTRIAXone (ROCEPHIN) 1 g in dextrose 5 % 50 mL IVPB    dextrose 50% injection 12.5 g    dextrose 50% injection 25 g    dorzolamide-timolol 2-0.5% ophthalmic solution 1 drop    enoxaparin injection 40 mg    ezetimibe tablet 10 mg    furosemide tablet 20 mg    glucagon (human recombinant) injection 1 mg    glucose chewable tablet 16 g    glucose chewable tablet 24 g    influenza (FLUZONE QUADRIVALENT) vaccine 0.5 mL    insulin aspart U-100 pen 0-5 Units     ketorolac 0.5% ophthalmic solution 1 drop    oxyCODONE-acetaminophen 7.5-325 mg per tablet 1 tablet    prednisoLONE acetate 1 % ophthalmic suspension 1 drop    senna-docusate 8.6-50 mg per tablet 1 tablet       LABS    Recent Results (from the past 24 hour(s))   POCT glucose    Collection Time: 11/10/18  8:33 PM   Result Value Ref Range    POCT Glucose 175 (H) 70 - 110 mg/dL   Comprehensive metabolic panel    Collection Time: 11/11/18  4:30 AM   Result Value Ref Range    Sodium 134 (L) 136 - 145 mmol/L    Potassium 5.8 (H) 3.5 - 5.1 mmol/L    Chloride 107 95 - 110 mmol/L    CO2 22 (L) 23 - 29 mmol/L    Glucose 113 (H) 70 - 110 mg/dL    BUN, Bld 36 (H) 6 - 20 mg/dL    Creatinine 1.1 0.5 - 1.4 mg/dL    Calcium 9.1 8.7 - 10.5 mg/dL    Total Protein 7.7 6.0 - 8.4 g/dL    Albumin 2.2 (L) 3.5 - 5.2 g/dL    Total Bilirubin 0.8 0.1 - 1.0 mg/dL    Alkaline Phosphatase 291 (H) 55 - 135 U/L    AST 22 10 - 40 U/L    ALT 20 10 - 44 U/L    Anion Gap 5 (L) 8 - 16 mmol/L    eGFR if African American >60 >60 mL/min/1.73 m^2    eGFR if non African American >60 >60 mL/min/1.73 m^2   POCT glucose    Collection Time: 11/11/18  7:59 AM   Result Value Ref Range    POCT Glucose 145 (H) 70 - 110 mg/dL   POCT glucose    Collection Time: 11/11/18 11:10 AM   Result Value Ref Range    POCT Glucose 161 (H) 70 - 110 mg/dL   POCT glucose    Collection Time: 11/11/18  3:11 PM   Result Value Ref Range    POCT Glucose 233 (H) 70 - 110 mg/dL   POCT glucose    Collection Time: 11/11/18  4:04 PM   Result Value Ref Range    POCT Glucose 191 (H) 70 - 110 mg/dL   ]    I/O last 3 completed shifts:  In: 2720 [P.O.:360; I.V.:2110; IV Piggyback:250]  Out: 700 [Urine:700]    Vitals:    11/11/18 0756 11/11/18 1113 11/11/18 1516 11/11/18 1609   BP: 125/60 (!) 115/59  (!) 127/58   Pulse: 85 79 86 76   Resp: 18 18 18 20   Temp: 98 °F (36.7 °C) 96.1 °F (35.6 °C)  97.6 °F (36.4 °C)   TempSrc: Oral Oral  Axillary   SpO2: 95% (!) 93% 95% 100%   Weight:       Height:            No Jvd, Thyromegaly or Lymphadenopathy  Lungs: Fairly clear anteriorly and laterally  Cor: RRR no G or rubs  Abd: Soft benign good bowel sounds non tender  Ext: R arm swollen, R foot sp toe amp, L foot with dressings which are intact but exposed toe necrotic. 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 4) Rarely ophthalmic solution could become systemic     Bothersome    Hepatosplenomegaly and allopurinol  Severe hypoalbuminemia   Probable hypercalcemia  Elevated alk phosph      Recom:    Renal diet  Dc allopurinol  Dc ketorolac oph sol  Cortrosyn stim test  Add po bicarb  Recheck ua  Bladder scan  PSA  Ammonia level  Add lactulose    No need for HD or Kayexalate  Consider Veltassa

## 2018-11-13 PROBLEM — L03.116 CELLULITIS OF LEFT LOWER LEG: Status: ACTIVE | Noted: 2018-11-08

## 2018-11-13 PROBLEM — E87.5 HYPERKALEMIA: Status: RESOLVED | Noted: 2018-11-08 | Resolved: 2018-11-13

## 2018-11-13 PROBLEM — N18.30 ACUTE RENAL FAILURE SUPERIMPOSED ON STAGE 3 CHRONIC KIDNEY DISEASE: Status: ACTIVE | Noted: 2018-11-08

## 2018-11-13 LAB
ALBUMIN SERPL BCP-MCNC: 2 G/DL
ALP SERPL-CCNC: 227 U/L
ALT SERPL W/O P-5'-P-CCNC: 17 U/L
ANION GAP SERPL CALC-SCNC: 6 MMOL/L
AST SERPL-CCNC: 18 U/L
BACTERIA BLD CULT: NORMAL
BACTERIA BLD CULT: NORMAL
BILIRUB SERPL-MCNC: 0.6 MG/DL
BUN SERPL-MCNC: 27 MG/DL
CALCIUM SERPL-MCNC: 8.5 MG/DL
CHLORIDE SERPL-SCNC: 109 MMOL/L
CO2 SERPL-SCNC: 22 MMOL/L
CREAT SERPL-MCNC: 0.8 MG/DL
EST. GFR  (AFRICAN AMERICAN): >60 ML/MIN/1.73 M^2
EST. GFR  (NON AFRICAN AMERICAN): >60 ML/MIN/1.73 M^2
GLUCOSE SERPL-MCNC: 158 MG/DL
POCT GLUCOSE: 149 MG/DL (ref 70–110)
POCT GLUCOSE: 166 MG/DL (ref 70–110)
POCT GLUCOSE: 181 MG/DL (ref 70–110)
POTASSIUM SERPL-SCNC: 4.3 MMOL/L
PROT SERPL-MCNC: 7 G/DL
SODIUM SERPL-SCNC: 137 MMOL/L

## 2018-11-13 PROCEDURE — 047Q3ZZ DILATION OF LEFT ANTERIOR TIBIAL ARTERY, PERCUTANEOUS APPROACH: ICD-10-PCS | Performed by: SURGERY

## 2018-11-13 PROCEDURE — 25000003 PHARM REV CODE 250: Performed by: SURGERY

## 2018-11-13 PROCEDURE — 80053 COMPREHEN METABOLIC PANEL: CPT

## 2018-11-13 PROCEDURE — 99152 MOD SED SAME PHYS/QHP 5/>YRS: CPT | Mod: ,,, | Performed by: SURGERY

## 2018-11-13 PROCEDURE — 047L3ZZ DILATION OF LEFT FEMORAL ARTERY, PERCUTANEOUS APPROACH: ICD-10-PCS | Performed by: SURGERY

## 2018-11-13 PROCEDURE — 047U3ZZ DILATION OF LEFT PERONEAL ARTERY, PERCUTANEOUS APPROACH: ICD-10-PCS | Performed by: SURGERY

## 2018-11-13 PROCEDURE — 25000003 PHARM REV CODE 250: Performed by: INTERNAL MEDICINE

## 2018-11-13 PROCEDURE — 25000003 PHARM REV CODE 250: Performed by: HOSPITALIST

## 2018-11-13 PROCEDURE — 37228 PR TIB/PER REVASC W/TLA: CPT | Mod: LT,,, | Performed by: SURGERY

## 2018-11-13 PROCEDURE — 75710 ARTERY X-RAYS ARM/LEG: CPT | Mod: 26,59,, | Performed by: SURGERY

## 2018-11-13 PROCEDURE — 25500020 PHARM REV CODE 255: Performed by: HOSPITALIST

## 2018-11-13 PROCEDURE — 25000003 PHARM REV CODE 250: Performed by: EMERGENCY MEDICINE

## 2018-11-13 PROCEDURE — 75625 CONTRAST EXAM ABDOMINL AORTA: CPT | Mod: 26,,, | Performed by: SURGERY

## 2018-11-13 PROCEDURE — 99233 SBSQ HOSP IP/OBS HIGH 50: CPT | Mod: 25,,, | Performed by: SURGERY

## 2018-11-13 PROCEDURE — 37232 PR REVASCULARIZE TIBIAL/PERON ARTERY,ANGIOPLASTY EA ADD: CPT | Mod: LT,,, | Performed by: SURGERY

## 2018-11-13 PROCEDURE — 63600175 PHARM REV CODE 636 W HCPCS: Performed by: SURGERY

## 2018-11-13 PROCEDURE — 21400001 HC TELEMETRY ROOM

## 2018-11-13 PROCEDURE — S0077 INJECTION, CLINDAMYCIN PHOSP: HCPCS | Performed by: SURGERY

## 2018-11-13 PROCEDURE — 36415 COLL VENOUS BLD VENIPUNCTURE: CPT

## 2018-11-13 PROCEDURE — 63600175 PHARM REV CODE 636 W HCPCS: Performed by: EMERGENCY MEDICINE

## 2018-11-13 PROCEDURE — 37224 PR FEM/POPL REVAS W/TLA: CPT | Mod: 51,LT,, | Performed by: SURGERY

## 2018-11-13 RX ORDER — CLINDAMYCIN PHOSPHATE 600 MG/50ML
600 INJECTION, SOLUTION INTRAVENOUS ONCE
Status: DISCONTINUED | OUTPATIENT
Start: 2018-11-13 | End: 2018-11-19

## 2018-11-13 RX ORDER — MIDAZOLAM HYDROCHLORIDE 1 MG/ML
INJECTION INTRAMUSCULAR; INTRAVENOUS CODE/TRAUMA/SEDATION MEDICATION
Status: COMPLETED | OUTPATIENT
Start: 2018-11-13 | End: 2018-11-13

## 2018-11-13 RX ORDER — CLINDAMYCIN PHOSPHATE 300 MG/50ML
INJECTION, SOLUTION INTRAVENOUS
Status: COMPLETED | OUTPATIENT
Start: 2018-11-13 | End: 2018-11-13

## 2018-11-13 RX ORDER — FENTANYL CITRATE 50 UG/ML
INJECTION, SOLUTION INTRAMUSCULAR; INTRAVENOUS CODE/TRAUMA/SEDATION MEDICATION
Status: COMPLETED | OUTPATIENT
Start: 2018-11-13 | End: 2018-11-13

## 2018-11-13 RX ORDER — HEPARIN SODIUM 1000 [USP'U]/ML
INJECTION, SOLUTION INTRAVENOUS; SUBCUTANEOUS CODE/TRAUMA/SEDATION MEDICATION
Status: COMPLETED | OUTPATIENT
Start: 2018-11-13 | End: 2018-11-13

## 2018-11-13 RX ORDER — PROTAMINE SULFATE 10 MG/ML
INJECTION, SOLUTION INTRAVENOUS CODE/TRAUMA/SEDATION MEDICATION
Status: COMPLETED | OUTPATIENT
Start: 2018-11-13 | End: 2018-11-13

## 2018-11-13 RX ADMIN — PROTAMINE SULFATE 40 MG: 10 INJECTION, SOLUTION INTRAVENOUS at 06:11

## 2018-11-13 RX ADMIN — OXYCODONE HYDROCHLORIDE AND ACETAMINOPHEN 1 TABLET: 7.5; 325 TABLET ORAL at 03:11

## 2018-11-13 RX ADMIN — PREDNISOLONE ACETATE 1 DROP: 10 SUSPENSION OPHTHALMIC at 09:11

## 2018-11-13 RX ADMIN — FENTANYL CITRATE 25 MCG: 50 INJECTION, SOLUTION INTRAMUSCULAR; INTRAVENOUS at 03:11

## 2018-11-13 RX ADMIN — MIDAZOLAM HYDROCHLORIDE 0.5 MG: 1 INJECTION, SOLUTION INTRAMUSCULAR; INTRAVENOUS at 03:11

## 2018-11-13 RX ADMIN — STANDARDIZED SENNA CONCENTRATE AND DOCUSATE SODIUM 1 TABLET: 8.6; 5 TABLET, FILM COATED ORAL at 08:11

## 2018-11-13 RX ADMIN — CLINDAMYCIN PHOSPHATE 600 MG: 6 INJECTION, SOLUTION INTRAVENOUS at 03:11

## 2018-11-13 RX ADMIN — LACTULOSE 10 G: 20 SOLUTION ORAL at 08:11

## 2018-11-13 RX ADMIN — OXYCODONE HYDROCHLORIDE AND ACETAMINOPHEN 1 TABLET: 7.5; 325 TABLET ORAL at 12:11

## 2018-11-13 RX ADMIN — IOHEXOL 130 ML: 300 INJECTION, SOLUTION INTRAVENOUS at 06:11

## 2018-11-13 RX ADMIN — FENTANYL CITRATE 25 MCG: 50 INJECTION, SOLUTION INTRAMUSCULAR; INTRAVENOUS at 05:11

## 2018-11-13 RX ADMIN — COLCHICINE 0.3 MG: 0.6 TABLET, FILM COATED ORAL at 09:11

## 2018-11-13 RX ADMIN — SODIUM CHLORIDE: 0.9 INJECTION, SOLUTION INTRAVENOUS at 01:11

## 2018-11-13 RX ADMIN — HEPARIN SODIUM 1000 UNITS: 1000 INJECTION, SOLUTION INTRAVENOUS; SUBCUTANEOUS at 04:11

## 2018-11-13 RX ADMIN — SODIUM BICARBONATE 650 MG: 325 TABLET ORAL at 08:11

## 2018-11-13 RX ADMIN — PREDNISOLONE ACETATE 1 DROP: 10 SUSPENSION OPHTHALMIC at 08:11

## 2018-11-13 RX ADMIN — AMLODIPINE BESYLATE 5 MG: 5 TABLET ORAL at 08:11

## 2018-11-13 RX ADMIN — STANDARDIZED SENNA CONCENTRATE AND DOCUSATE SODIUM 1 TABLET: 8.6; 5 TABLET, FILM COATED ORAL at 09:11

## 2018-11-13 RX ADMIN — EZETIMIBE 10 MG: 10 TABLET ORAL at 08:11

## 2018-11-13 RX ADMIN — ASPIRIN 81 MG 81 MG: 81 TABLET ORAL at 08:11

## 2018-11-13 RX ADMIN — DORZOLAMIDE HYDROCHLORIDE AND TIMOLOL MALEATE 1 DROP: 20; 5 SOLUTION/ DROPS OPHTHALMIC at 09:11

## 2018-11-13 RX ADMIN — FUROSEMIDE 20 MG: 20 TABLET ORAL at 08:11

## 2018-11-13 RX ADMIN — HEPARIN SODIUM 1000 UNITS: 1000 INJECTION, SOLUTION INTRAVENOUS; SUBCUTANEOUS at 05:11

## 2018-11-13 RX ADMIN — LACTULOSE 10 G: 20 SOLUTION ORAL at 09:11

## 2018-11-13 RX ADMIN — SODIUM BICARBONATE 650 MG: 325 TABLET ORAL at 09:11

## 2018-11-13 RX ADMIN — CARVEDILOL 6.25 MG: 6.25 TABLET, FILM COATED ORAL at 08:11

## 2018-11-13 RX ADMIN — CARVEDILOL 6.25 MG: 6.25 TABLET, FILM COATED ORAL at 09:11

## 2018-11-13 RX ADMIN — BRIMONIDINE TARTRATE 1 DROP: 1 SOLUTION/ DROPS OPHTHALMIC at 09:11

## 2018-11-13 RX ADMIN — HEPARIN SODIUM 10000 UNITS: 1000 INJECTION, SOLUTION INTRAVENOUS; SUBCUTANEOUS at 03:11

## 2018-11-13 RX ADMIN — CEFTRIAXONE 1 G: 1 INJECTION, SOLUTION INTRAVENOUS at 01:11

## 2018-11-13 RX ADMIN — CEFTRIAXONE 1 G: 1 INJECTION, SOLUTION INTRAVENOUS at 12:11

## 2018-11-13 NOTE — PROGRESS NOTES
TN spoke with Rolanda at [facility]. Rolanda informed that patient is requesting to begin a disability application.

## 2018-11-13 NOTE — PROGRESS NOTES
[facility]  Progress Note    Patient Name: Marvin Ray  MRN: [id number]  Patient Class: IP- Inpatient   Admission Date: 11/8/2018  Length of Stay: 5 days  Attending Physician: Tree Ortega MD  Primary Care Provider: Rubén Davis MD        Subjective:     Principal Problem:Cellulitis of left lower leg    HPI:  57 yo male with CAD, HLP, hepatosplenomegaly/ascites, DM2 with CKD3 and retinopathy, PVD, gout, chronic combined CHF and chronic BLE wounds L>R presented from Dr. Chan office with concern for nonhealing wound to LLE. He was recently given bactrim and presented today with LAYNE, hyperkalemia and increased pain and edema to LLE. Pt has h/o Group G strep bacteremia (10/10). ICD implanted 11/2.  The patient's sister reports doing dressing changes of LE wounds 5 times a day. Noticeable weeping of fluid from the LE. Pt made NPO by vascular surgery and plan for angiogram and possible stent placement to LE to assist with better LE wound healing.     Hospital Course:  PT admitted with non-healing Left foot wounds. Plan for angiogram  By vascular surgery cancelled due to hyperkalemia/LAYNE.  Pt received kayexalate x 2 and  Potassium persistently high. Add calcium gluconate x one dose and IV lasix. Monitor K levels and plan for possible intervention in next few days if labs improve. Wound care consulted.     11/10- serum potassium elevated, check urine electrolytes; calcium IV given, pt frustrated about being in hospital and not able to ambulate on his own   11/11- persistent hyperkalemia in setting of improving LAYNE; pt received IV lasix, kayexalate x2, calcium gluconate 1500mg; insulin and dextrose today, lasix IV again and albuterol high dose neb x one  11/12- potassium 4.9; nephrology added sodium bicarb; vascular surgery planning intervention with improved labs   Patient will have leg angiogram today,    Interval History: pt has no new complaints     Review of  Systems   Constitutional: Negative for chills and fever.   HENT: Negative for congestion.    Eyes: Negative for visual disturbance.   Respiratory: Negative for shortness of breath.    Cardiovascular: Negative for chest pain.   Gastrointestinal: Negative for abdominal pain.   Endocrine: Negative for polyphagia.   Genitourinary: Negative for dysuria.   Musculoskeletal: Negative for back pain.   Skin: Positive for color change and wound. Negative for pallor and rash.   Allergic/Immunologic: Negative for immunocompromised state.   Neurological: Negative for weakness.   Hematological: Does not bruise/bleed easily.   Psychiatric/Behavioral: Negative for confusion.     Objective:     Vital Signs (Most Recent):  Temp: 98.3 °F (36.8 °C) (11/13/18 0746)  Pulse: 81 (11/13/18 0746)  Resp: 18 (11/13/18 0746)  BP: 127/63 (11/13/18 0746)  SpO2: (!) 94 % (11/13/18 0746) Vital Signs (24h Range):  Temp:  [98.3 °F (36.8 °C)-98.9 °F (37.2 °C)] 98.3 °F (36.8 °C)  Pulse:  [78-92] 81  Resp:  [18-20] 18  SpO2:  [90 %-95 %] 94 %  BP: (116-130)/(60-71) 127/63     Weight: 98.1 kg (216 lb 4.3 oz)  Body mass index is 31.03 kg/m².    Intake/Output Summary (Last 24 hours) at 11/13/2018 1024  Last data filed at 11/13/2018 0510  Gross per 24 hour   Intake --   Output 550 ml   Net -550 ml      Physical Exam   Constitutional: He is oriented to person, place, and time. He appears well-developed and well-nourished. No distress.   HENT:   Head: Normocephalic and atraumatic.   Eyes: Conjunctivae are normal.   Neck: Neck supple.   Cardiovascular: Normal rate.   Pulses:       Femoral pulses are 2+ on the right side, and 2+ on the left side.       Dorsalis pedis pulses are Detected w/ doppler on the right side, and Detected w/ doppler on the left side.        Posterior tibial pulses are Detected w/ doppler on the right side, and Detected w/ doppler on the left side.   Pulmonary/Chest: Effort normal. No respiratory distress. He exhibits no tenderness.    Abdominal: Soft. He exhibits no distension and no mass. There is no tenderness. There is no rebound and no guarding.   Musculoskeletal: Normal range of motion. He exhibits edema. He exhibits no tenderness or deformity.   Feet:   Right Foot:   Skin Integrity: Negative for ulcer, blister, skin breakdown, callus or dry skin.   Left Foot:   Skin Integrity: Positive for ulcer, blister, skin breakdown, callus and dry skin.   Neurological: He is alert and oriented to person, place, and time. No sensory deficit.   Skin: Skin is warm and dry. Capillary refill takes less than 2 seconds. No rash noted. No erythema. No pallor.   Psychiatric: He has a normal mood and affect.   Vitals reviewed.      Significant Labs:   BMP:   Recent Labs   Lab 11/13/18  0543   *      K 4.3      CO2 22*   BUN 27*   CREATININE 0.8   CALCIUM 8.5*       Significant Imaging: I have reviewed and interpreted all pertinent imaging results/findings within the past 24 hours.    Assessment/Plan:      * Cellulitis of left lower leg    LLE >R; secondary to PVD  Repeat blood cultures  Rocephin IV Q12 based on last blood culture +GroupG strep  Vascular surgery consulted - see PVD,jenna have angiogram with possible stent placement today.  Pain control  Wound care consulted          Hyperkalemia    Secondary to LAYNE and bactrim/aldactone/acei - holding agents  kayexlate given in ED, again x2  Calcium gluconate 500mg IV and then 1 gm followed = 1500mg  Lasix 40mg IV  - insulin and dextrose and high dose nebs   Nephrology consulted for any further recs - oral sodium bicarb added x 6 days  Bmp in am     Potassium improved        Metabolic acidosis    Secondary to LAYNE  Bicarb given in ED, oral bicarb   Bmp in am        Acute renal failure superimposed on stage 3 chronic kidney disease    Pt has reported baseline CKD3  Recently placed on bactrim in addition to acei and aldactone/lasix - hold offending agents  Gentle IVF  BMP in am  Renal dose  antibiotics  Avoid nephrotoxins        Open wound of left foot    Wound care consulted  See above        Chronic combined systolic and diastolic heart failure    EF 20%,No acute issues  Hold aldactone and acei insetting of hyperkalemia and LAYNE   Resume BB  S/p ICD 11/2       Stage 3 chronic kidney disease    Will be monitored.       Hepatosplenomegaly    Recent paracentesis - negative culture   Monitor CMP             PVD (peripheral vascular disease)      Angiogram today.  Vascular surgery consulted  Pain control        CAD (coronary artery disease)    No acute issues  Resume aspirin and zetia  Statin intolerance         Essential hypertension      Controlled  Resume amlodipine and BB  Hold aldactone and acei as stated above      DM type 2 with diabetic peripheral neuropathy    Uncontrolled, A1c 8.3%  Basal -bolus insulin   Diabetic diet             VTE Risk Mitigation (From admission, onward)        Ordered     enoxaparin injection 40 mg  Daily      11/08/18 1501     IP VTE HIGH RISK PATIENT  Once      11/08/18 1501              Tree Ortega MD  Department of Hospital Medicine   [facility]

## 2018-11-13 NOTE — PLAN OF CARE
Problem: Patient Care Overview  Goal: Plan of Care Review   11/13/18 0221   Coping/Psychosocial   Plan Of Care Reviewed With patient

## 2018-11-13 NOTE — ASSESSMENT & PLAN NOTE
LLE >R; secondary to PVD  Repeat blood cultures  Rocephin IV Q12 based on last blood culture +GroupG strep  Vascular surgery consulted - see jenna BEAVER have angiogram with possible stent placement today.  Pain control  Wound care consulted

## 2018-11-13 NOTE — PROGRESS NOTES
"Waiting for his angio    Awake alert oriented NAD    Blood pressure 126/60, pulse 79, temperature 97.6 °F (36.4 °C), resp. rate 18, height 5' 10" (1.778 m), weight 98.1 kg (216 lb 4.3 oz), SpO2 96 %.    Fair UO    CMP  Sodium   Date Value Ref Range Status   11/13/2018 137 136 - 145 mmol/L Final     Potassium   Date Value Ref Range Status   11/13/2018 4.3 3.5 - 5.1 mmol/L Final     Chloride   Date Value Ref Range Status   11/13/2018 109 95 - 110 mmol/L Final     CO2   Date Value Ref Range Status   11/13/2018 22 (L) 23 - 29 mmol/L Final     Glucose   Date Value Ref Range Status   11/13/2018 158 (H) 70 - 110 mg/dL Final     BUN, Bld   Date Value Ref Range Status   11/13/2018 27 (H) 6 - 20 mg/dL Final     Creatinine   Date Value Ref Range Status   11/13/2018 0.8 0.5 - 1.4 mg/dL Final     Calcium   Date Value Ref Range Status   11/13/2018 8.5 (L) 8.7 - 10.5 mg/dL Final     Total Protein   Date Value Ref Range Status   11/13/2018 7.0 6.0 - 8.4 g/dL Final     Albumin   Date Value Ref Range Status   11/13/2018 2.0 (L) 3.5 - 5.2 g/dL Final     Total Bilirubin   Date Value Ref Range Status   11/13/2018 0.6 0.1 - 1.0 mg/dL Final     Comment:     For infants and newborns, interpretation of results should be based  on gestational age, weight and in agreement with clinical  observations.  Premature Infant recommended reference ranges:  Up to 24 hours.............<8.0 mg/dL  Up to 48 hours............<12.0 mg/dL  3-5 days..................<15.0 mg/dL  6-29 days.................<15.0 mg/dL       Alkaline Phosphatase   Date Value Ref Range Status   11/13/2018 227 (H) 55 - 135 U/L Final     AST   Date Value Ref Range Status   11/13/2018 18 10 - 40 U/L Final     ALT   Date Value Ref Range Status   11/13/2018 17 10 - 44 U/L Final     Anion Gap   Date Value Ref Range Status   11/13/2018 6 (L) 8 - 16 mmol/L Final     eGFR if    Date Value Ref Range Status   11/13/2018 >60 >60 mL/min/1.73 m^2 Final     eGFR if non African " American   Date Value Ref Range Status   11/13/2018 >60 >60 mL/min/1.73 m^2 Final     Comment:     Calculation used to obtain the estimated glomerular filtration  rate (eGFR) is the CKD-EPI equation.          Renal wise pt stable    K/CO 2    Still has the Bladder dysfx as a potential problem ( pt on flomax)

## 2018-11-13 NOTE — PLAN OF CARE
Problem: Fall Risk (Adult)  Intervention: Safety Promotion/Fall Prevention   11/12/18 8239   Safety Interventions   Safety Promotion/Fall Prevention assistive device/personal item within reach;bed alarm set;diversional activities provided;Fall Risk reviewed with patient/family;lighting adjusted;medications reviewed;nonskid shoes/socks when out of bed;room near unit station;side rails raised x 2;instructed to call staff for mobility

## 2018-11-13 NOTE — SUBJECTIVE & OBJECTIVE
Interval History: pt has no new complaints     Review of Systems   Constitutional: Negative for chills and fever.   HENT: Negative for congestion.    Eyes: Negative for visual disturbance.   Respiratory: Negative for shortness of breath.    Cardiovascular: Negative for chest pain.   Gastrointestinal: Negative for abdominal pain.   Endocrine: Negative for polyphagia.   Genitourinary: Negative for dysuria.   Musculoskeletal: Negative for back pain.   Skin: Positive for color change and wound. Negative for pallor and rash.   Allergic/Immunologic: Negative for immunocompromised state.   Neurological: Negative for weakness.   Hematological: Does not bruise/bleed easily.   Psychiatric/Behavioral: Negative for confusion.     Objective:     Vital Signs (Most Recent):  Temp: 98.3 °F (36.8 °C) (11/13/18 0746)  Pulse: 81 (11/13/18 0746)  Resp: 18 (11/13/18 0746)  BP: 127/63 (11/13/18 0746)  SpO2: (!) 94 % (11/13/18 0746) Vital Signs (24h Range):  Temp:  [98.3 °F (36.8 °C)-98.9 °F (37.2 °C)] 98.3 °F (36.8 °C)  Pulse:  [78-92] 81  Resp:  [18-20] 18  SpO2:  [90 %-95 %] 94 %  BP: (116-130)/(60-71) 127/63     Weight: 98.1 kg (216 lb 4.3 oz)  Body mass index is 31.03 kg/m².    Intake/Output Summary (Last 24 hours) at 11/13/2018 1024  Last data filed at 11/13/2018 0510  Gross per 24 hour   Intake --   Output 550 ml   Net -550 ml      Physical Exam   Constitutional: He is oriented to person, place, and time. He appears well-developed and well-nourished. No distress.   HENT:   Head: Normocephalic and atraumatic.   Eyes: Conjunctivae are normal.   Neck: Neck supple.   Cardiovascular: Normal rate.   Pulses:       Femoral pulses are 2+ on the right side, and 2+ on the left side.       Dorsalis pedis pulses are Detected w/ doppler on the right side, and Detected w/ doppler on the left side.        Posterior tibial pulses are Detected w/ doppler on the right side, and Detected w/ doppler on the left side.   Pulmonary/Chest: Effort normal.  No respiratory distress. He exhibits no tenderness.   Abdominal: Soft. He exhibits no distension and no mass. There is no tenderness. There is no rebound and no guarding.   Musculoskeletal: Normal range of motion. He exhibits edema. He exhibits no tenderness or deformity.   Feet:   Right Foot:   Skin Integrity: Negative for ulcer, blister, skin breakdown, callus or dry skin.   Left Foot:   Skin Integrity: Positive for ulcer, blister, skin breakdown, callus and dry skin.   Neurological: He is alert and oriented to person, place, and time. No sensory deficit.   Skin: Skin is warm and dry. Capillary refill takes less than 2 seconds. No rash noted. No erythema. No pallor.   Psychiatric: He has a normal mood and affect.   Vitals reviewed.      Significant Labs:   BMP:   Recent Labs   Lab 11/13/18  0543   *      K 4.3      CO2 22*   BUN 27*   CREATININE 0.8   CALCIUM 8.5*       Significant Imaging: I have reviewed and interpreted all pertinent imaging results/findings within the past 24 hours.

## 2018-11-13 NOTE — ASSESSMENT & PLAN NOTE
EF 20%,No acute issues  Hold aldactone and acei insetting of hyperkalemia and LAYNE   Resume BB  S/p ICD 11/2

## 2018-11-13 NOTE — ASSESSMENT & PLAN NOTE
Secondary to LAYNE and bactrim/aldactone/acei - holding agents  kayexlate given in ED, again x2  Calcium gluconate 500mg IV and then 1 gm followed = 1500mg  Lasix 40mg IV  - insulin and dextrose and high dose nebs   Nephrology consulted for any further recs - oral sodium bicarb added x 6 days  Bmp in am     Potassium improved

## 2018-11-14 ENCOUNTER — ANESTHESIA EVENT (OUTPATIENT)
Dept: SURGERY | Facility: HOSPITAL | Age: 58
DRG: 579 | End: 2018-11-14
Payer: COMMERCIAL

## 2018-11-14 ENCOUNTER — ANESTHESIA (OUTPATIENT)
Dept: SURGERY | Facility: HOSPITAL | Age: 58
DRG: 579 | End: 2018-11-14
Payer: COMMERCIAL

## 2018-11-14 ENCOUNTER — TELEPHONE (OUTPATIENT)
Dept: ENDOCRINOLOGY | Facility: CLINIC | Age: 58
End: 2018-11-14

## 2018-11-14 LAB
ALBUMIN SERPL BCP-MCNC: 2 G/DL
ALP SERPL-CCNC: 213 U/L
ALT SERPL W/O P-5'-P-CCNC: 16 U/L
ANION GAP SERPL CALC-SCNC: 6 MMOL/L
AST SERPL-CCNC: 13 U/L
BILIRUB SERPL-MCNC: 0.7 MG/DL
BUN SERPL-MCNC: 22 MG/DL
CALCIUM SERPL-MCNC: 8.7 MG/DL
CHLORIDE SERPL-SCNC: 108 MMOL/L
CO2 SERPL-SCNC: 23 MMOL/L
CREAT SERPL-MCNC: 0.8 MG/DL
EST. GFR  (AFRICAN AMERICAN): >60 ML/MIN/1.73 M^2
EST. GFR  (NON AFRICAN AMERICAN): >60 ML/MIN/1.73 M^2
GLUCOSE SERPL-MCNC: 189 MG/DL
POCT GLUCOSE: 174 MG/DL (ref 70–110)
POCT GLUCOSE: 200 MG/DL (ref 70–110)
POCT GLUCOSE: 232 MG/DL (ref 70–110)
POTASSIUM SERPL-SCNC: 4.9 MMOL/L
PROT SERPL-MCNC: 7.3 G/DL
SODIUM SERPL-SCNC: 137 MMOL/L

## 2018-11-14 PROCEDURE — 63600175 PHARM REV CODE 636 W HCPCS: Performed by: NURSE ANESTHETIST, CERTIFIED REGISTERED

## 2018-11-14 PROCEDURE — D9220A PRA ANESTHESIA: Mod: ANES,,, | Performed by: ANESTHESIOLOGY

## 2018-11-14 PROCEDURE — 99024 POSTOP FOLLOW-UP VISIT: CPT | Mod: ,,, | Performed by: SURGERY

## 2018-11-14 PROCEDURE — 25000003 PHARM REV CODE 250: Performed by: EMERGENCY MEDICINE

## 2018-11-14 PROCEDURE — 21400001 HC TELEMETRY ROOM

## 2018-11-14 PROCEDURE — 25000003 PHARM REV CODE 250: Performed by: SURGERY

## 2018-11-14 PROCEDURE — D9220A PRA ANESTHESIA: Mod: CRNA,,, | Performed by: NURSE ANESTHETIST, CERTIFIED REGISTERED

## 2018-11-14 PROCEDURE — 87075 CULTR BACTERIA EXCEPT BLOOD: CPT

## 2018-11-14 PROCEDURE — 37000009 HC ANESTHESIA EA ADD 15 MINS: Performed by: SURGERY

## 2018-11-14 PROCEDURE — 80053 COMPREHEN METABOLIC PANEL: CPT

## 2018-11-14 PROCEDURE — 27000221 HC OXYGEN, UP TO 24 HOURS

## 2018-11-14 PROCEDURE — 76942 ECHO GUIDE FOR BIOPSY: CPT | Performed by: ANESTHESIOLOGY

## 2018-11-14 PROCEDURE — 0KBW0ZZ EXCISION OF LEFT FOOT MUSCLE, OPEN APPROACH: ICD-10-PCS | Performed by: SURGERY

## 2018-11-14 PROCEDURE — 63600175 PHARM REV CODE 636 W HCPCS: Performed by: HOSPITALIST

## 2018-11-14 PROCEDURE — 36415 COLL VENOUS BLD VENIPUNCTURE: CPT

## 2018-11-14 PROCEDURE — 11043 DBRDMT MUSC&/FSCA 1ST 20/<: CPT | Mod: ,,, | Performed by: SURGERY

## 2018-11-14 PROCEDURE — 94761 N-INVAS EAR/PLS OXIMETRY MLT: CPT

## 2018-11-14 PROCEDURE — 37000008 HC ANESTHESIA 1ST 15 MINUTES: Performed by: SURGERY

## 2018-11-14 PROCEDURE — 63600175 PHARM REV CODE 636 W HCPCS: Performed by: ANESTHESIOLOGY

## 2018-11-14 PROCEDURE — 87205 SMEAR GRAM STAIN: CPT

## 2018-11-14 PROCEDURE — 11046 DBRDMT MUSC&/FSCA EA ADDL: CPT | Mod: ,,, | Performed by: SURGERY

## 2018-11-14 PROCEDURE — 71000033 HC RECOVERY, INTIAL HOUR: Performed by: SURGERY

## 2018-11-14 PROCEDURE — 25000003 PHARM REV CODE 250: Performed by: INTERNAL MEDICINE

## 2018-11-14 PROCEDURE — 36000705 HC OR TIME LEV I EA ADD 15 MIN: Performed by: SURGERY

## 2018-11-14 PROCEDURE — 36000704 HC OR TIME LEV I 1ST 15 MIN: Performed by: SURGERY

## 2018-11-14 PROCEDURE — 63600175 PHARM REV CODE 636 W HCPCS: Performed by: EMERGENCY MEDICINE

## 2018-11-14 PROCEDURE — 25000003 PHARM REV CODE 250: Performed by: HOSPITALIST

## 2018-11-14 PROCEDURE — 87070 CULTURE OTHR SPECIMN AEROBIC: CPT

## 2018-11-14 PROCEDURE — 25000003 PHARM REV CODE 250: Performed by: ANESTHESIOLOGY

## 2018-11-14 RX ORDER — HYDROMORPHONE HYDROCHLORIDE 2 MG/ML
0.2 INJECTION, SOLUTION INTRAMUSCULAR; INTRAVENOUS; SUBCUTANEOUS EVERY 5 MIN PRN
Status: DISCONTINUED | OUTPATIENT
Start: 2018-11-14 | End: 2018-11-14

## 2018-11-14 RX ORDER — FENTANYL CITRATE 50 UG/ML
INJECTION, SOLUTION INTRAMUSCULAR; INTRAVENOUS
Status: DISCONTINUED | OUTPATIENT
Start: 2018-11-14 | End: 2018-11-14

## 2018-11-14 RX ORDER — FENTANYL CITRATE 50 UG/ML
25 INJECTION, SOLUTION INTRAMUSCULAR; INTRAVENOUS EVERY 5 MIN PRN
Status: DISCONTINUED | OUTPATIENT
Start: 2018-11-14 | End: 2018-11-14

## 2018-11-14 RX ORDER — SODIUM CHLORIDE 0.9 % (FLUSH) 0.9 %
3 SYRINGE (ML) INJECTION
Status: DISCONTINUED | OUTPATIENT
Start: 2018-11-14 | End: 2018-11-21

## 2018-11-14 RX ORDER — LIDOCAINE HYDROCHLORIDE 20 MG/ML
INJECTION, SOLUTION INFILTRATION; PERINEURAL
Status: DISCONTINUED | OUTPATIENT
Start: 2018-11-14 | End: 2018-11-14

## 2018-11-14 RX ORDER — BACITRACIN 50000 [IU]/1
INJECTION, POWDER, FOR SOLUTION INTRAMUSCULAR
Status: DISCONTINUED | OUTPATIENT
Start: 2018-11-14 | End: 2018-11-14 | Stop reason: HOSPADM

## 2018-11-14 RX ORDER — INSULIN ASPART 100 [IU]/ML
5 INJECTION, SOLUTION INTRAVENOUS; SUBCUTANEOUS
Status: DISCONTINUED | OUTPATIENT
Start: 2018-11-14 | End: 2018-11-21 | Stop reason: HOSPADM

## 2018-11-14 RX ORDER — ROPIVACAINE HYDROCHLORIDE 5 MG/ML
INJECTION, SOLUTION EPIDURAL; INFILTRATION; PERINEURAL
Status: DISCONTINUED | OUTPATIENT
Start: 2018-11-14 | End: 2018-11-14

## 2018-11-14 RX ORDER — MIDAZOLAM HYDROCHLORIDE 1 MG/ML
INJECTION, SOLUTION INTRAMUSCULAR; INTRAVENOUS
Status: DISCONTINUED | OUTPATIENT
Start: 2018-11-14 | End: 2018-11-14

## 2018-11-14 RX ADMIN — SODIUM CHLORIDE: 0.9 INJECTION, SOLUTION INTRAVENOUS at 03:11

## 2018-11-14 RX ADMIN — CARVEDILOL 6.25 MG: 6.25 TABLET, FILM COATED ORAL at 08:11

## 2018-11-14 RX ADMIN — DORZOLAMIDE HYDROCHLORIDE AND TIMOLOL MALEATE 1 DROP: 20; 5 SOLUTION/ DROPS OPHTHALMIC at 08:11

## 2018-11-14 RX ADMIN — FENTANYL CITRATE 25 MCG: 50 INJECTION INTRAMUSCULAR; INTRAVENOUS at 01:11

## 2018-11-14 RX ADMIN — STANDARDIZED SENNA CONCENTRATE AND DOCUSATE SODIUM 1 TABLET: 8.6; 5 TABLET, FILM COATED ORAL at 08:11

## 2018-11-14 RX ADMIN — ENOXAPARIN SODIUM 40 MG: 100 INJECTION SUBCUTANEOUS at 04:11

## 2018-11-14 RX ADMIN — SODIUM CHLORIDE: 0.9 INJECTION, SOLUTION INTRAVENOUS at 05:11

## 2018-11-14 RX ADMIN — ROPIVACAINE HYDROCHLORIDE 30 ML: 5 INJECTION, SOLUTION EPIDURAL; INFILTRATION; PERINEURAL at 12:11

## 2018-11-14 RX ADMIN — SODIUM BICARBONATE 650 MG: 325 TABLET ORAL at 08:11

## 2018-11-14 RX ADMIN — OXYCODONE HYDROCHLORIDE AND ACETAMINOPHEN 1 TABLET: 7.5; 325 TABLET ORAL at 06:11

## 2018-11-14 RX ADMIN — INSULIN ASPART 5 UNITS: 100 INJECTION, SOLUTION INTRAVENOUS; SUBCUTANEOUS at 04:11

## 2018-11-14 RX ADMIN — BRIMONIDINE TARTRATE 1 DROP: 1 SOLUTION/ DROPS OPHTHALMIC at 04:11

## 2018-11-14 RX ADMIN — INSULIN ASPART 1 UNITS: 100 INJECTION, SOLUTION INTRAVENOUS; SUBCUTANEOUS at 08:11

## 2018-11-14 RX ADMIN — CEFTRIAXONE 1 G: 1 INJECTION, SOLUTION INTRAVENOUS at 01:11

## 2018-11-14 RX ADMIN — BRIMONIDINE TARTRATE 1 DROP: 1 SOLUTION/ DROPS OPHTHALMIC at 08:11

## 2018-11-14 RX ADMIN — LIDOCAINE HYDROCHLORIDE 15 ML: 20 INJECTION, SOLUTION INFILTRATION; PERINEURAL at 12:11

## 2018-11-14 RX ADMIN — PREDNISOLONE ACETATE 1 DROP: 10 SUSPENSION OPHTHALMIC at 04:11

## 2018-11-14 RX ADMIN — MIDAZOLAM HYDROCHLORIDE 1 MG: 1 INJECTION, SOLUTION INTRAMUSCULAR; INTRAVENOUS at 12:11

## 2018-11-14 RX ADMIN — PREDNISOLONE ACETATE 1 DROP: 10 SUSPENSION OPHTHALMIC at 08:11

## 2018-11-14 RX ADMIN — CEFTRIAXONE 1 G: 1 INJECTION, SOLUTION INTRAVENOUS at 03:11

## 2018-11-14 RX ADMIN — MIDAZOLAM HYDROCHLORIDE 1 MG: 1 INJECTION, SOLUTION INTRAMUSCULAR; INTRAVENOUS at 01:11

## 2018-11-14 NOTE — TELEPHONE ENCOUNTER
Left message for patient to call to schedule follow up once patient is out of the hospital. Waiting to hear back.

## 2018-11-14 NOTE — SEDATION DOCUMENTATION
VSS. No s/s of distress noted. Dressing to right groin clean, dry, and intact. Report given to Ai WALSH Tele.

## 2018-11-14 NOTE — BRIEF OP NOTE
[facility]  Brief Operative Note    SUMMARY     Surgery Date: 11/14/2018     Surgeon(s) and Role:     * Mitch Chan MD - Primary    Assisting Surgeon: None    Pre-op Diagnosis:  Cellulitis of left lower extremity [L03.116]    Post-op Diagnosis:  Post-Op Diagnosis Codes:     * Cellulitis of left lower extremity [L03.116]    Procedure(s) (LRB):  Incision and Drainage, left lower extremity debridement, washout (Left)    Anesthesia: Regional    Description of Procedure: extensive left foot wound debridement    Description of the findings of the procedure: bleeding tissue to left foot, two areas of deep infection with purulent drainage    Estimated Blood Loss: <5cc    Specimens:   Specimen (12h ago, onward)    None

## 2018-11-14 NOTE — PROGRESS NOTES
Patient lying in bed aaox4. o2 therapy in place at 3l, titrated down to 2l per nc. o2 sat at 92%. Bumped back up to 3l. Denies any pain or discomfort. IV to left shoulder arm currently infusing iv fluids at 50ml/hr. . Site free of redness or swelling. Breath sounds clear. Bowel sounds low pitched but active in all 4 quads. abd distended and firm. Bilateral lower extremeties redness/cellulities, warm to touch, denies pain. Left lower extremity wrapped with dressing for wound care. Call light in reach. Bed low and locked. Bed alarm on. Will continue to monitor.  Awaiting to go to scheduled procedure. Consent has been signed    AM blood sugar checked, no insulin given due to being NPO

## 2018-11-14 NOTE — ASSESSMENT & PLAN NOTE
Vascular surgery consulted  Pain control     S/P angiogram,on 11.13.18 ,1. US guided R CFA access  2. Aortogram   3. LLE angiogram  4. L SFA angioplasty with a 5 x 40 mm [place] balloon  5. L AT angioplasty with a 2 x 220 mm [place] balloon  6. L peroneal angioplasty with a 2 x 80 mm [place] balloon  7. Moderate sedation,  Will have wound debridement today.

## 2018-11-14 NOTE — PLAN OF CARE
Problem: Diabetes, Type 2 (Adult)  Intervention: Support/Optimize Psychosocial Response to Condition   11/14/18 1603   Coping/Psychosocial Interventions   Supportive Measures active listening utilized   Environmental Support calm environment promoted     Intervention: Optimize Glycemic Control   11/14/18 1603   Nutrition Interventions   Glycemic Management blood glucose monitoring       Goal: Signs and Symptoms of Listed Potential Problems Will be Absent, Minimized or Managed (Diabetes, Type 2)  Signs and symptoms of listed potential problems will be absent, minimized or managed by discharge/transition of care (reference Diabetes, Type 2 (Adult) CPG).  Outcome: Ongoing (interventions implemented as appropriate)   11/14/18 1603   Diabetes, Type 2   Problems Assessed (Type 2 Diabetes) all   Problems Present (Type 2 Diabetes) hyperglycemia

## 2018-11-14 NOTE — TRANSFER OF CARE
"Anesthesia Transfer of Care Note    Patient: Marvin Ray    Procedure(s) Performed: Procedure(s) (LRB):  Incision and Drainage, left lower extremity debridement, washout (Left)    Patient location: PACU    Anesthesia Type: regional and MAC    Transport from OR: Transported from OR on room air with adequate spontaneous ventilation    Post pain: adequate analgesia    Post assessment: no apparent anesthetic complications and tolerated procedure well    Post vital signs: stable    Level of consciousness: awake, alert and oriented    Nausea/Vomiting: no nausea/vomiting    Complications: none    Transfer of care protocol was followed      Last vitals:   Visit Vitals  BP (!) 142/79 (BP Location: Left arm)   Pulse 81   Temp 36.4 °C (97.5 °F) (Oral)   Resp 18   Ht 5' 10" (1.778 m)   Wt 100.1 kg (220 lb 10.9 oz)   SpO2 (!) 94%   BMI 31.66 kg/m²     "

## 2018-11-14 NOTE — PROGRESS NOTES
[facility]  Progress Note    Patient Name: Marvin Ray  MRN: [id number]  Patient Class: IP- Inpatient   Admission Date: 11/8/2018  Length of Stay: 6 days  Attending Physician: Tree rOtega MD  Primary Care Provider: Rubén Davis MD        Subjective:     Principal Problem:Cellulitis of left lower leg    HPI:  57 yo male with CAD, HLP, hepatosplenomegaly/ascites, DM2 with CKD3 and retinopathy, PVD, gout, chronic combined CHF and chronic BLE wounds L>R presented from Dr. Chan office with concern for nonhealing wound to LLE. He was recently given bactrim and presented today with LAYNE, hyperkalemia and increased pain and edema to LLE. Pt has h/o Group G strep bacteremia (10/10). ICD implanted 11/2.  The patient's sister reports doing dressing changes of LE wounds 5 times a day. Noticeable weeping of fluid from the LE. Pt made NPO by vascular surgery and plan for angiogram and possible stent placement to LE to assist with better LE wound healing.     Hospital Course:  PT admitted with non-healing Left foot wounds. Plan for angiogram  By vascular surgery cancelled due to hyperkalemia/LAYNE.  Pt received kayexalate x 2 and  Potassium persistently high. Add calcium gluconate x one dose and IV lasix. Monitor K levels and plan for possible intervention in next few days if labs improve. Wound care consulted.     11/10- serum potassium elevated, check urine electrolytes; calcium IV given, pt frustrated about being in hospital and not able to ambulate on his own   11/11- persistent hyperkalemia in setting of improving LAYNE; pt received IV lasix, kayexalate x2, calcium gluconate 1500mg; insulin and dextrose today, lasix IV again and albuterol high dose neb x one  11/12- potassium 4.9; nephrology added sodium bicarb; vascular surgery planning intervention with improved labs   S/P angiogram,on 11.13.18 ,1. US guided R CFA access  2. Aortogram   3. LLE angiogram  4. L SFA  angioplasty with a 5 x 40 mm [place] balloon  5. L AT angioplasty with a 2 x 220 mm [place] balloon  6. L peroneal angioplasty with a 2 x 80 mm [place] balloon  7. Moderate sedation,  Will have wound debridement today.        Interval History: pt has no new complaints     Review of Systems   Constitutional: Negative for chills and fever.   HENT: Negative for congestion.    Eyes: Negative for visual disturbance.   Respiratory: Negative for shortness of breath.    Cardiovascular: Negative for chest pain.   Gastrointestinal: Negative for abdominal pain.   Endocrine: Negative for polyphagia.   Genitourinary: Negative for dysuria.   Musculoskeletal: Negative for back pain.   Skin: Positive for color change and wound. Negative for pallor and rash.   Allergic/Immunologic: Negative for immunocompromised state.   Neurological: Negative for weakness.   Hematological: Does not bruise/bleed easily.   Psychiatric/Behavioral: Negative for confusion.     Objective:     Vital Signs (Most Recent):  Temp: 97.8 °F (36.6 °C) (11/14/18 0727)  Pulse: 82 (11/14/18 0727)  Resp: 18 (11/14/18 0727)  BP: 121/65 (11/14/18 0727)  SpO2: 98 % (11/14/18 0727) Vital Signs (24h Range):  Temp:  [97.4 °F (36.3 °C)-98 °F (36.7 °C)] 97.8 °F (36.6 °C)  Pulse:  [79-92] 82  Resp:  [16-19] 18  SpO2:  [90 %-98 %] 98 %  BP: (115-163)/(60-87) 121/65     Weight: 100.1 kg (220 lb 10.9 oz)  Body mass index is 31.66 kg/m².    Intake/Output Summary (Last 24 hours) at 11/14/2018 0931  Last data filed at 11/14/2018 0520  Gross per 24 hour   Intake 240 ml   Output 950 ml   Net -710 ml      Physical Exam   Constitutional: He is oriented to person, place, and time. He appears well-developed and well-nourished. No distress.   HENT:   Head: Normocephalic and atraumatic.   Eyes: Conjunctivae are normal.   Neck: Neck supple.   Cardiovascular: Normal rate.   Pulses:       Femoral pulses are 2+ on the right side, and 2+ on the left side.       Dorsalis pedis pulses are Detected  w/ doppler on the right side, and Detected w/ doppler on the left side.        Posterior tibial pulses are Detected w/ doppler on the right side, and Detected w/ doppler on the left side.   Pulmonary/Chest: Effort normal. No respiratory distress. He exhibits no tenderness.   Abdominal: Soft. He exhibits no distension and no mass. There is no tenderness. There is no rebound and no guarding.   Musculoskeletal: Normal range of motion. He exhibits edema. He exhibits no tenderness or deformity.   Feet:   Right Foot:   Skin Integrity: Negative for ulcer, blister, skin breakdown, callus or dry skin.   Left Foot:   Skin Integrity: Positive for ulcer, blister, skin breakdown, callus and dry skin.   Neurological: He is alert and oriented to person, place, and time. No sensory deficit.   Skin: Skin is warm and dry. Capillary refill takes less than 2 seconds. No rash noted. No erythema. No pallor.   Psychiatric: He has a normal mood and affect.   Vitals reviewed.      Significant Labs:   BMP:   Recent Labs   Lab 11/14/18  0521   *      K 4.9      CO2 23   BUN 22*   CREATININE 0.8   CALCIUM 8.7       Significant Imaging: I have reviewed and interpreted all pertinent imaging results/findings within the past 24 hours.    Assessment/Plan:      * Cellulitis of left lower leg    LLE >R; secondary to PVD  Repeat blood cultures  Rocephin IV Q12 based on last blood culture +GroupG strep  Vascular surgery consulted - see jenna BEAVER have angiogram with possible stent placement today.  Pain control  Wound care consulted   Will have debridement today.         Metabolic acidosis    Secondary to LAYNE  Bicarb given in ED, oral bicarb   Bmp in am        Acute renal failure superimposed on stage 3 chronic kidney disease    Pt has reported baseline CKD3  Recently placed on bactrim in addition to acei and aldactone/lasix - hold offending agents  Gentle IVF  BMP in am  Renal dose antibiotics  Avoid nephrotoxins        Open wound of  left foot    Wound care consulted  See above        Chronic combined systolic and diastolic heart failure    EF 20%,No acute issues  Hold aldactone and acei insetting of hyperkalemia and LAYNE   Resume BB  S/p ICD 11/2       Stage 3 chronic kidney disease    Will be monitored.       Hepatosplenomegaly    Recent paracentesis - negative culture   Monitor CMP             PVD (peripheral vascular disease)        Vascular surgery consulted  Pain control     S/P angiogram,on 11.13.18 ,1. US guided R CFA access  2. Aortogram   3. LLE angiogram  4. L SFA angioplasty with a 5 x 40 mm [place] balloon  5. L AT angioplasty with a 2 x 220 mm [place] balloon  6. L peroneal angioplasty with a 2 x 80 mm [place] balloon  7. Moderate sedation,  Will have wound debridement today.     CAD (coronary artery disease)    No acute issues  Resume aspirin and zetia  Statin intolerance         Essential hypertension      Controlled  Resume amlodipine and BB  Hold aldactone and acei as stated above      DM type 2 with diabetic peripheral neuropathy    Uncontrolled, A1c 8.3%  Basal -bolus insulin   Diabetic diet             VTE Risk Mitigation (From admission, onward)        Ordered     enoxaparin injection 40 mg  Daily      11/08/18 1501     IP VTE HIGH RISK PATIENT  Once      11/08/18 1501              Tree Ortega MD  Department of Hospital Medicine   [facility]

## 2018-11-14 NOTE — PROGRESS NOTES
[facility]  Vascular Surgery  Progress Note    Patient Name: Marvin Ray  MRN: [id number]  Admission Date: 11/8/2018  Primary Care Provider: Rubén Davis MD    Subjective:     Interval History: No complaints today.  NPO for angiogram today.    Post-Op Info:  Procedure(s) (LRB):  AORTOGRAM, WITH SERIALOGRAPHY, LEFT LOWER EXTREMITY, POSSIBLE INTERVENTION (Left)           Medications:  Continuous Infusions:   sodium chloride 0.9% 50 mL/hr at 11/13/18 0135     Scheduled Meds:   amLODIPine  5 mg Oral Daily    aspirin  81 mg Oral Daily    brimonidine 0.1%  1 drop Both Eyes TID    carvedilol  6.25 mg Oral BID    cefTRIAXone (ROCEPHIN) IVPB  1 g Intravenous Q12H    clindamycin (CLEOCIN) IVPB  600 mg Intravenous Once    colchicine  0.3 mg Oral Daily    dorzolamide-timolol 2-0.5%  1 drop Both Eyes BID    enoxaparin  40 mg Subcutaneous Daily    ezetimibe  10 mg Oral Daily    furosemide  20 mg Oral Daily    lactulose  10 g Oral BID    prednisoLONE acetate  1 drop Right Eye TID    senna-docusate 8.6-50 mg  1 tablet Oral BID    sodium bicarbonate  650 mg Oral BID     PRN Meds:acetaminophen, dextrose 50%, dextrose 50%, glucagon (human recombinant), glucose, glucose, influenza, insulin aspart U-100, oxyCODONE-acetaminophen     Objective:     Vital Signs (Most Recent):  Temp: 97.6 °F (36.4 °C) (11/13/18 1109)  Pulse: 85 (11/13/18 1800)  Resp: 18 (11/13/18 1800)  BP: (!) 163/79 (11/13/18 1800)  SpO2: (!) 90 % (11/13/18 1810) Vital Signs (24h Range):  Temp:  [97.6 °F (36.4 °C)-98.9 °F (37.2 °C)] 97.6 °F (36.4 °C)  Pulse:  [78-92] 85  Resp:  [16-19] 18  SpO2:  [90 %-98 %] 90 %  BP: (116-163)/(60-87) 163/79          Physical Exam   Constitutional: He is oriented to person, place, and time. He appears well-developed and well-nourished. No distress.   HENT:   Head: Normocephalic and atraumatic.   Eyes: Conjunctivae are normal.   Neck: Neck supple.   Cardiovascular: Normal rate.   Pulses:        Femoral pulses are 2+ on the right side, and 2+ on the left side.       Dorsalis pedis pulses are Detected w/ doppler on the right side, and Detected w/ doppler on the left side.        Posterior tibial pulses are Detected w/ doppler on the right side, and Detected w/ doppler on the left side.   Pulmonary/Chest: Effort normal. No respiratory distress. He exhibits no tenderness.   Abdominal: Soft. He exhibits no distension and no mass. There is no tenderness. There is no rebound and no guarding.   Musculoskeletal: Normal range of motion. He exhibits no edema, tenderness or deformity.   Feet:   Right Foot:   Skin Integrity: Negative for ulcer, blister, skin breakdown, callus or dry skin.   Left Foot:   Skin Integrity: Positive for ulcer, blister, skin breakdown, callus and dry skin.   Neurological: He is alert and oriented to person, place, and time. No sensory deficit.   Skin: Skin is warm and dry. Capillary refill takes less than 2 seconds. No rash noted. No erythema. No pallor.   Psychiatric: He has a normal mood and affect.   Vitals reviewed.      Significant Labs:  All pertinent labs from the last 24 hours have been reviewed.    Significant Diagnostics:  I have reviewed all pertinent imaging results/findings within the past 24 hours.    Assessment/Plan:     Open wound of left foot    -L foot wound multifactorial likely due to DM, PVD and venous insufficiency - rec angiography with possible intervention, plan for 11/13/18 now that K and Cr has normalized;   -cont optimization with Nephrology consult if persists   -Cont wound care consult recs  -Cont offloading  -Abx per primary team  -Strict glycemic control         Mitch Chan MD  Vascular Surgery  [facility]

## 2018-11-14 NOTE — PT/OT/SLP PROGRESS
Occupational Therapy      Patient Name:  Marvin Ray   MRN:  [id number]    Patient not seen today secondary to debridement surgery. Will follow-up tomorrow.    Trudy Antonio OT  11/14/2018

## 2018-11-14 NOTE — ANESTHESIA PREPROCEDURE EVALUATION
11/14/2018  Marvin Ray is a 58 y.o., male.    Anesthesia Evaluation    I have reviewed the Patient Summary Reports.     I have reviewed the Nursing Notes.   I have reviewed the Medications.     Review of Systems  Anesthesia Hx:  No problems with previous Anesthesia Denies Hx of Anesthetic complications  Neg history of prior surgery. Denies Family Hx of Anesthesia complications.   Denies Personal Hx of Anesthesia complications.   Social:  Former Smoker   Hematology/Oncology:  Hematology Normal   Oncology Normal     EENT/Dental:EENT/Dental Normal   Cardiovascular:   Exercise tolerance: good Hypertension CAD   CONCLUSIONS     1 - Moderately depressed left ventricular systolic function (EF 35-40%).     2 - Quantitatively measured LV function is 38%.     3 - Left ventricular diastolic dysfunction.     4 - Mild mitral regurgitation.     5 - Severe left atrial enlargement.     6 - Pulmonary hypertension. The estimated PA systolic pressure is 54 mmHg.     7 - Mild pulmonic regurgitation.     8 - Mild tricuspid regurgitation.     9 - Right ventricular enlargement with normal systolic function.     10 - Small pericardial effusion.     11 - Intermediate central venous pressure.       Peripheral vascular disease    Cardiac defibrillator in place   Pulmonary:  Pulmonary Normal    Renal/:  Renal/ Normal Chronic Renal Disease, CRI     Hepatic/GI:  Hepatic/GI Normal    Musculoskeletal:  Musculoskeletal Normal    Neurological:   Neuromuscular Disease,    Endocrine:   Diabetes, type 2 Hgb a1c -8.2%   Dermatological:  Skin Normal    Psych:  Psychiatric Normal           Chemistry        Component Value Date/Time     11/14/2018 0521    K 4.9 11/14/2018 0521     11/14/2018 0521    CO2 23 11/14/2018 0521    BUN 22 (H) 11/14/2018 0521    CREATININE 0.8 11/14/2018 0521     (H) 11/14/2018 0521         Component Value Date/Time    CALCIUM 8.7 11/14/2018 0521    ALKPHOS 213 (H) 11/14/2018 0521    AST 13 11/14/2018 0521    ALT 16 11/14/2018 0521    BILITOT 0.7 11/14/2018 0521    ESTGFRAFRICA >60 11/14/2018 0521    EGFRNONAA >60 11/14/2018 0521        Lab Results   Component Value Date    WBC 11.12 11/09/2018    HGB 10.4 (L) 11/09/2018    HCT 31.3 (L) 11/09/2018    MCV 93 11/09/2018     (H) 11/09/2018         Physical Exam  General:  Well nourished    Airway/Jaw/Neck:  Airway Findings: Mouth Opening: Normal General Airway Assessment: Adult  Mallampati: II  TM Distance: Normal, at least 6 cm         Dental:  DENTAL FINDINGS: Normal   Chest/Lungs:  Chest/Lungs Clear    Heart/Vascular:  Heart Findings: Normal Heart murmur: negative       Mental Status:  Mental Status Findings:  Cooperative, Alert and Oriented         Anesthesia Plan  Type of Anesthesia, risks & benefits discussed:  Anesthesia Type:  general, regional  Patient's Preference:   Intra-op Monitoring Plan:   Intra-op Monitoring Plan Comments:   Post Op Pain Control Plan:   Post Op Pain Control Plan Comments:   Induction:   IV  Beta Blocker:  Patient is on a Beta-Blocker and has received one dose within the past 24 hours (No further documentation required).       Informed Consent: Patient understands risks and agrees with Anesthesia plan.  Questions answered. Anesthesia consent signed with patient.  ASA Score: 3     Day of Surgery Review of History & Physical:            Ready For Surgery From Anesthesia Perspective.

## 2018-11-14 NOTE — BRIEF OP NOTE
[facility]  Brief Operative Note    SUMMARY     Surgery Date: 11/9/2018     Surgeon(s) and Role:     * Mitch Chan MD - Primary    Assisting Surgeon: None    Pre-op Diagnosis:  Atherosclerosis of native artery of left lower extremity with ulceration of midfoot [I70.244]    Post-op Diagnosis: same    Procedure:  1. US guided R CFA access  2. Aortogram   3. LLE angiogram  4. L SFA angioplasty with a 5 x 40 mm [place] balloon  5. L AT angioplasty with a 2 x 220 mm [place] balloon  6. L peroneal angioplasty with a 2 x 80 mm [place] balloon  7. Moderate sedation    Anesthesia: RN IV Sedation    Description of Procedure: adequate US for access    Description of the findings of the procedure: markedly improved flow in LLE after angioplasty    Estimated Blood Loss: <5cc         Specimens:   Specimen (12h ago, onward)    None

## 2018-11-14 NOTE — ANESTHESIA PROCEDURE NOTES
Peripheral    Patient location during procedure: pre-op    Reason for block: primary anesthetic   Diagnosis: id left foot   Start time: 11/14/2018 12:45 PM  Timeout: 11/14/2018 12:45 PM   End time: 11/14/2018 1:00 PM  Staffing  Anesthesiologist: Supa Arellano MD  Performed: anesthesiologist   Preanesthetic Checklist  Completed: patient identified, site marked, surgical consent, pre-op evaluation, timeout performed, IV checked, risks and benefits discussed and monitors and equipment checked  Peripheral Block  Patient position: right lateral decubitus  Prep: ChloraPrep  Patient monitoring: heart rate, cardiac monitor, continuous pulse ox and frequent blood pressure checks  Block type: popliteal  Laterality: left  Injection technique: single shot  Needle  Needle type: Stimuplex   Needle gauge: 21 G  Needle length: 4 in  Needle localization: anatomical landmarks and ultrasound guidance   -ultrasound image captured on disc.  Assessment  Injection assessment: negative aspiration, negative parasthesia and local visualized surrounding nerve  Paresthesia pain: none  Heart rate change: no  Slow fractionated injection: yes  Additional Notes  VSS.  DOS[facility] RN monitoring vitals throughout procedure.  Patient tolerated procedure well....block supplemented with saphaenous nerve block at ankle..difficult injection due to indurated skin

## 2018-11-14 NOTE — OP NOTE
DATE OF PROCEDURE:  11/13/2018    SERVICE:  Vascular surgery.    SURGEON:  Mitch Chan M.D.    ASSISTANT:  None.    PREOPERATIVE DIAGNOSES:  1.  Left lower extremity atherosclerosis with left foot wound with necrosis of   underlying musculature.  2.  Hypertension.  3.  Hyperlipidemia.  4.  Diabetes mellitus.    POSTOPERATIVE DIAGNOSES:  1.  Left lower extremity atherosclerosis with left foot wound with necrosis of   underlying musculature.  2.  Hypertension.  3.  Hyperlipidemia.  4.  Diabetes mellitus.    PROCEDURE PERFORMED:  1.  Ultrasound-guided right common femoral artery percutaneous access.  2.  Selective catheterization of first order arterial system, right lower   extremity.  3.  Selective catheterization of infrarenal aorta.  4.  Aortogram.  5.  Selective catheterization of left lower extremity, first order arterial   system.  6.  Left lower extremity angiogram.  7.  Selective catheterization of second order arterial system, left lower   extremity.  8.  Left SFA angioplasty with a 5 x 40 mm [place] balloon.  9.  Selective catheterization of third order arterial system, left lower   extremity.  10.  Left AT angioplasty with a 2 x 220 mm [place] balloon.  11.  Left peroneal angioplasty with a 2 x 80 mm [place] balloon.  12.  Moderate sedation.  13.  A 6-[place] Mynx closure, right common femoral artery.    MODERATE SEDATION TIME:  160 minutes.  I was present for the entire procedure   and monitored the patient's cardiopulmonary status during moderate sedation.    Please refer to the nurse's log for medication, route, and frequency.    INDICATIONS FOR PROCEDURE:  A 58-year-old white male with the above listed   medical history who was initially seen last month as an inpatient consultation   with left lower extremity edema and bullae skin changes to his left lower   extremity and foot with cellulitis.  He was treated medically and had an   adequate ankle waveform at that time.  He was seen in clinic for  followup and   his cellulitis had resolved as well as his edema had markedly improved, although   he had opening of his blisters and a significant left foot wound.  It was   determined that the patient will benefit from admission to the hospital and   angiography with possible intervention to determine if revascularization would   assist with his wound healing.  The patient subsequently had acute-on-chronic   renal failure with hyperkalemia that took several days to control.  Ultimately,   it was resolved by 11/12/2018 and it was recommended that he have an angiogram   with possible intervention, left lower extremity.  After risks and benefits were   discussed with the patient, the patient stated understanding and desired to   proceed with surgical intervention.    FINDINGS:  Ultrasound showed a patent right common femoral artery without   significant arterial occlusive disease.  Aortogram showed a patent aorta without   aneurysmal or occlusive disease, patent bilateral renal arteries.  No   flow-limiting stenosis of the bilateral iliofemoral system.  Patent   hypogastrics.  Left lower extremity angiograms showed no flow-limiting stenosis   of the left common femoral artery profunda.  The left SFA had a flow-limiting   stenosis of the mid to distal aspect with approximately 50-60% narrowing.    Popliteal artery had mild calcific disease without flow-limiting stenosis.  The   proximal peroneal was heavily calcified with severe stenosis with flow-limiting   stenosis.  The left proximal and mid AT was also heavily calcified with   moderate-to-severe stenosis.  There was two vessel runoff to the foot with some   filling of the DP and tarsal branches.  After angioplasty was performed, this   markedly improved flow through the left lower extremity with improved filling   into the digit vessels.    DESCRIPTION OF THE PROCEDURE:  The patient was seen preoperatively by anesthesia   and was deemed stable for surgery.  He was  brought to the Angio Suite and   placed supine on the angio table.  A timeout was performed and the patient was   prepped and draped in sterile fashion.  Ultrasound was used to access the right   common femoral artery percutaneously.  This was done with a micropuncture   technique.  This was then upsized to a 5-[place] sheath and due to the patient's   chronic kidney disease, a femoral angiogram was not performed due to accurate   placement on ultrasound guidance.  A glide J-wire was placed into the infrarenal   aorta followed by Omniflush catheter and aortogram was performed with the above   listed findings.  Next, a floppy Glidewire was used to select the left external   iliac artery and the Omniflush was advanced to this location and the left lower   extremity angiogram was performed in the above listed location.  After   independent interpretation of the imaging, I decided to intervene.  Next, a   stiff angled Glidewire was advanced into the left SFA and a 6-[place]ish x 65 cm   destination sheath was advanced into the left mid SFA.  A left lower extremity   angiogram was repeated showing the SFA stenosis and distal disease.  Balloon   angioplasty was then performed of the SFA listed above.  After followup   angiogram, it was determined that the peroneal and AT disease was flow limiting.    The AT was selected with an 0.014 [place] wire and crossed with a Quick-Cross.    The true lumen was confirmed after crossing the areas of severe stenosis.    Balloon angioplasty was performed with the above listed balloons.  After initial   balloon angioplasty with a two half nominal pressure, it was determined that   the patient would benefit from further angioplasty.  This was then repeated with   full nominal pressure.  After subsequent angiogram, it was determined that   there had been an embolization to the distal AT.  The wire was extended to the   level of the ankle and confirmed to be true lumen and an angioplasty was  then   performed at 4 atmospheres due to the small nature of the vessel.  There was   restoration of flow into the AT filling the foot briskly.  We then selected the   peroneal and performed angioplasty of the proximal peroneal.  After angioplasty   there was markedly improved flow throughout the left lower extremity with   improved filling of the left foot.  It was determined at this time to remove all   catheters and wires and a 6-[place] Mynx was used to close the right common   femoral artery after 40 mg of protamine was administered to the patient without   any hemodynamic changes.  Manual pressure was held for 15 minutes and a sterile   pressure dressing was applied at the conclusion of the case.  [facility] stable condition.    ESTIMATED BLOOD LOSS:  Less than 5 mL.    COMPLICATIONS:  None.      MIKAELA/KAREN  dd: 11/13/2018 18:57:03 (CST)  td: 11/14/2018 00:23:41 (CST)  Doc ID   #[id number]  Job ID #[id number]    CC:

## 2018-11-14 NOTE — TELEPHONE ENCOUNTER
[place] not covered by [employer] is which is a suitable alternative.     Please ask pt to call us when he is discharged so I can make appropriate recommendations at that time.

## 2018-11-14 NOTE — PROGRESS NOTES
[facility]  Vascular Surgery  Progress Note    Patient Name: Marvin Ray  MRN: [id number]  Admission Date: 11/8/2018  Primary Care Provider: Rubén Davis MD    Subjective:     Interval History: No complaints today.    Post-Op Info:  Procedure(s) (LRB):  Incision and Drainage, left lower extremity debridement, washout (Left)   Day of Surgery       Medications:  Continuous Infusions:   sodium chloride 0.9% 50 mL/hr at 11/14/18 0307     Scheduled Meds:   amLODIPine  5 mg Oral Daily    aspirin  81 mg Oral Daily    brimonidine 0.1%  1 drop Both Eyes TID    carvedilol  6.25 mg Oral BID    cefTRIAXone (ROCEPHIN) IVPB  1 g Intravenous Q12H    clindamycin (CLEOCIN) IVPB  600 mg Intravenous Once    colchicine  0.3 mg Oral Daily    dorzolamide-timolol 2-0.5%  1 drop Both Eyes BID    enoxaparin  40 mg Subcutaneous Daily    ezetimibe  10 mg Oral Daily    furosemide  20 mg Oral Daily    insulin aspart U-100  5 Units Subcutaneous TIDWM    lactulose  10 g Oral BID    prednisoLONE acetate  1 drop Right Eye TID    senna-docusate 8.6-50 mg  1 tablet Oral BID    sodium bicarbonate  650 mg Oral BID     PRN Meds:acetaminophen, dextrose 50%, dextrose 50%, fentaNYL, glucagon (human recombinant), glucose, glucose, HYDROmorphone, influenza, insulin aspart U-100, oxyCODONE-acetaminophen, sodium chloride 0.9%, sodium chloride 0.9%     Objective:     Vital Signs (Most Recent):  Temp: 98 °F (36.7 °C) (11/14/18 1537)  Pulse: 83 (11/14/18 1537)  Resp: 18 (11/14/18 1537)  BP: 126/78 (11/14/18 1537)  SpO2: (!) 93 % (11/14/18 1537) Vital Signs (24h Range):  Temp:  [97.4 °F (36.3 °C)-98 °F (36.7 °C)] 98 °F (36.7 °C)  Pulse:  [76-92] 83  Resp:  [15-22] 18  SpO2:  [90 %-98 %] 93 %  BP: (115-163)/(65-87) 126/78     Date 11/14/18 0700 - 11/15/18 0659   Shift 8280-8176 4754-6276 6129-6553 24 Hour Total   INTAKE   I.V.(mL/kg) 250(2.5)   250(2.5)   Shift Total(mL/kg) 250(2.5)   250(2.5)   OUTPUT   Shift Total(mL/kg)        Weight (kg) 100.1 100.1 100.1 100.1       Physical Exam   Constitutional: He is oriented to person, place, and time. He appears well-developed and well-nourished. No distress.   HENT:   Head: Normocephalic and atraumatic.   Eyes: Conjunctivae are normal.   Neck: Neck supple.   Cardiovascular: Normal rate.   Pulses:       Femoral pulses are 2+ on the right side, and 2+ on the left side.       Dorsalis pedis pulses are Detected w/ doppler on the right side, and Detected w/ doppler on the left side.        Posterior tibial pulses are Detected w/ doppler on the right side, and Detected w/ doppler on the left side.   Pulmonary/Chest: Effort normal. No respiratory distress. He exhibits no tenderness.   Abdominal: Soft. He exhibits no distension and no mass. There is no tenderness. There is no rebound and no guarding.   Musculoskeletal: Normal range of motion. He exhibits no edema, tenderness or deformity.   Feet:   Right Foot:   Skin Integrity: Negative for ulcer, blister, skin breakdown, callus or dry skin.   Left Foot:   Skin Integrity: Positive for ulcer, blister, skin breakdown, callus and dry skin.   Neurological: He is alert and oriented to person, place, and time. No sensory deficit.   Skin: Skin is warm and dry. Capillary refill takes less than 2 seconds. No rash noted. No erythema. No pallor.   Psychiatric: He has a normal mood and affect.   Vitals reviewed.      Significant Labs:  All pertinent labs from the last 24 hours have been reviewed.    Significant Diagnostics:  I have reviewed all pertinent imaging results/findings within the past 24 hours.    Assessment/Plan:     Open wound of left foot    -L foot wound multifactorial likely due to DM, PVD and venous insufficiency s/p LLE angiography with SFA, AT and peroneal angioplasty - to OR for L foot washout/debridement today, NPO  -Cont wound care consult recs  -Cont offloading  -Abx per primary team  -Strict glycemic control         Mitch Chan,  MD  Vascular Surgery  [facility]

## 2018-11-14 NOTE — PROGRESS NOTES
Renal fx back to normal    Will sign off at this time     Thanks    Please reconsult if needed    GR      CMP  Sodium   Date Value Ref Range Status   11/14/2018 137 136 - 145 mmol/L Final     Potassium   Date Value Ref Range Status   11/14/2018 4.9 3.5 - 5.1 mmol/L Final     Chloride   Date Value Ref Range Status   11/14/2018 108 95 - 110 mmol/L Final     CO2   Date Value Ref Range Status   11/14/2018 23 23 - 29 mmol/L Final     Glucose   Date Value Ref Range Status   11/14/2018 189 (H) 70 - 110 mg/dL Final     BUN, Bld   Date Value Ref Range Status   11/14/2018 22 (H) 6 - 20 mg/dL Final     Creatinine   Date Value Ref Range Status   11/14/2018 0.8 0.5 - 1.4 mg/dL Final     Calcium   Date Value Ref Range Status   11/14/2018 8.7 8.7 - 10.5 mg/dL Final     Total Protein   Date Value Ref Range Status   11/14/2018 7.3 6.0 - 8.4 g/dL Final     Albumin   Date Value Ref Range Status   11/14/2018 2.0 (L) 3.5 - 5.2 g/dL Final     Total Bilirubin   Date Value Ref Range Status   11/14/2018 0.7 0.1 - 1.0 mg/dL Final     Comment:     For infants and newborns, interpretation of results should be based  on gestational age, weight and in agreement with clinical  observations.  Premature Infant recommended reference ranges:  Up to 24 hours.............<8.0 mg/dL  Up to 48 hours............<12.0 mg/dL  3-5 days..................<15.0 mg/dL  6-29 days.................<15.0 mg/dL       Alkaline Phosphatase   Date Value Ref Range Status   11/14/2018 213 (H) 55 - 135 U/L Final     AST   Date Value Ref Range Status   11/14/2018 13 10 - 40 U/L Final     ALT   Date Value Ref Range Status   11/14/2018 16 10 - 44 U/L Final     Anion Gap   Date Value Ref Range Status   11/14/2018 6 (L) 8 - 16 mmol/L Final     eGFR if    Date Value Ref Range Status   11/14/2018 >60 >60 mL/min/1.73 m^2 Final     eGFR if non    Date Value Ref Range Status   11/14/2018 >60 >60 mL/min/1.73 m^2 Final     Comment:     Calculation used  to obtain the estimated glomerular filtration  rate (eGFR) is the CKD-EPI equation.

## 2018-11-14 NOTE — SUBJECTIVE & OBJECTIVE
Interval History: pt has no new complaints     Review of Systems   Constitutional: Negative for chills and fever.   HENT: Negative for congestion.    Eyes: Negative for visual disturbance.   Respiratory: Negative for shortness of breath.    Cardiovascular: Negative for chest pain.   Gastrointestinal: Negative for abdominal pain.   Endocrine: Negative for polyphagia.   Genitourinary: Negative for dysuria.   Musculoskeletal: Negative for back pain.   Skin: Positive for color change and wound. Negative for pallor and rash.   Allergic/Immunologic: Negative for immunocompromised state.   Neurological: Negative for weakness.   Hematological: Does not bruise/bleed easily.   Psychiatric/Behavioral: Negative for confusion.     Objective:     Vital Signs (Most Recent):  Temp: 97.8 °F (36.6 °C) (11/14/18 0727)  Pulse: 82 (11/14/18 0727)  Resp: 18 (11/14/18 0727)  BP: 121/65 (11/14/18 0727)  SpO2: 98 % (11/14/18 0727) Vital Signs (24h Range):  Temp:  [97.4 °F (36.3 °C)-98 °F (36.7 °C)] 97.8 °F (36.6 °C)  Pulse:  [79-92] 82  Resp:  [16-19] 18  SpO2:  [90 %-98 %] 98 %  BP: (115-163)/(60-87) 121/65     Weight: 100.1 kg (220 lb 10.9 oz)  Body mass index is 31.66 kg/m².    Intake/Output Summary (Last 24 hours) at 11/14/2018 0949  Last data filed at 11/14/2018 0520  Gross per 24 hour   Intake 240 ml   Output 950 ml   Net -710 ml      Physical Exam   Constitutional: He is oriented to person, place, and time. He appears well-developed and well-nourished. No distress.   HENT:   Head: Normocephalic and atraumatic.   Eyes: Conjunctivae are normal.   Neck: Neck supple.   Cardiovascular: Normal rate.   Pulses:       Femoral pulses are 2+ on the right side, and 2+ on the left side.       Dorsalis pedis pulses are Detected w/ doppler on the right side, and Detected w/ doppler on the left side.        Posterior tibial pulses are Detected w/ doppler on the right side, and Detected w/ doppler on the left side.   Pulmonary/Chest: Effort normal.  No respiratory distress. He exhibits no tenderness.   Abdominal: Soft. He exhibits no distension and no mass. There is no tenderness. There is no rebound and no guarding.   Musculoskeletal: Normal range of motion. He exhibits edema. He exhibits no tenderness or deformity.   Feet:   Right Foot:   Skin Integrity: Negative for ulcer, blister, skin breakdown, callus or dry skin.   Left Foot:   Skin Integrity: Positive for ulcer, blister, skin breakdown, callus and dry skin.   Neurological: He is alert and oriented to person, place, and time. No sensory deficit.   Skin: Skin is warm and dry. Capillary refill takes less than 2 seconds. No rash noted. No erythema. No pallor.   Psychiatric: He has a normal mood and affect.   Vitals reviewed.      Significant Labs:   BMP:   Recent Labs   Lab 11/14/18  0521   *      K 4.9      CO2 23   BUN 22*   CREATININE 0.8   CALCIUM 8.7       Significant Imaging: I have reviewed and interpreted all pertinent imaging results/findings within the past 24 hours.

## 2018-11-14 NOTE — ASSESSMENT & PLAN NOTE
-L foot wound multifactorial likely due to DM, PVD and venous insufficiency s/p LLE angiography with SFA, AT and peroneal angioplasty - to OR for L foot washout/debridement today, NPO  -Cont wound care consult recs  -Cont offloading  -Abx per primary team  -Strict glycemic control

## 2018-11-14 NOTE — SUBJECTIVE & OBJECTIVE
Medications:  Continuous Infusions:   sodium chloride 0.9% 50 mL/hr at 11/13/18 0135     Scheduled Meds:   amLODIPine  5 mg Oral Daily    aspirin  81 mg Oral Daily    brimonidine 0.1%  1 drop Both Eyes TID    carvedilol  6.25 mg Oral BID    cefTRIAXone (ROCEPHIN) IVPB  1 g Intravenous Q12H    clindamycin (CLEOCIN) IVPB  600 mg Intravenous Once    colchicine  0.3 mg Oral Daily    dorzolamide-timolol 2-0.5%  1 drop Both Eyes BID    enoxaparin  40 mg Subcutaneous Daily    ezetimibe  10 mg Oral Daily    furosemide  20 mg Oral Daily    lactulose  10 g Oral BID    prednisoLONE acetate  1 drop Right Eye TID    senna-docusate 8.6-50 mg  1 tablet Oral BID    sodium bicarbonate  650 mg Oral BID     PRN Meds:acetaminophen, dextrose 50%, dextrose 50%, glucagon (human recombinant), glucose, glucose, influenza, insulin aspart U-100, oxyCODONE-acetaminophen     Objective:     Vital Signs (Most Recent):  Temp: 97.6 °F (36.4 °C) (11/13/18 1109)  Pulse: 85 (11/13/18 1800)  Resp: 18 (11/13/18 1800)  BP: (!) 163/79 (11/13/18 1800)  SpO2: (!) 90 % (11/13/18 1810) Vital Signs (24h Range):  Temp:  [97.6 °F (36.4 °C)-98.9 °F (37.2 °C)] 97.6 °F (36.4 °C)  Pulse:  [78-92] 85  Resp:  [16-19] 18  SpO2:  [90 %-98 %] 90 %  BP: (116-163)/(60-87) 163/79          Physical Exam   Constitutional: He is oriented to person, place, and time. He appears well-developed and well-nourished. No distress.   HENT:   Head: Normocephalic and atraumatic.   Eyes: Conjunctivae are normal.   Neck: Neck supple.   Cardiovascular: Normal rate.   Pulses:       Femoral pulses are 2+ on the right side, and 2+ on the left side.       Dorsalis pedis pulses are Detected w/ doppler on the right side, and Detected w/ doppler on the left side.        Posterior tibial pulses are Detected w/ doppler on the right side, and Detected w/ doppler on the left side.   Pulmonary/Chest: Effort normal. No respiratory distress. He exhibits no tenderness.   Abdominal: Soft.  He exhibits no distension and no mass. There is no tenderness. There is no rebound and no guarding.   Musculoskeletal: Normal range of motion. He exhibits no edema, tenderness or deformity.   Feet:   Right Foot:   Skin Integrity: Negative for ulcer, blister, skin breakdown, callus or dry skin.   Left Foot:   Skin Integrity: Positive for ulcer, blister, skin breakdown, callus and dry skin.   Neurological: He is alert and oriented to person, place, and time. No sensory deficit.   Skin: Skin is warm and dry. Capillary refill takes less than 2 seconds. No rash noted. No erythema. No pallor.   Psychiatric: He has a normal mood and affect.   Vitals reviewed.      Significant Labs:  All pertinent labs from the last 24 hours have been reviewed.    Significant Diagnostics:  I have reviewed all pertinent imaging results/findings within the past 24 hours.

## 2018-11-14 NOTE — ASSESSMENT & PLAN NOTE
-L foot wound multifactorial likely due to DM, PVD and venous insufficiency - rec angiography with possible intervention, plan for 11/13/18 now that K and Cr has normalized;   -cont optimization with Nephrology consult if persists   -Cont wound care consult recs  -Cont offloading  -Abx per primary team  -Strict glycemic control

## 2018-11-14 NOTE — ASSESSMENT & PLAN NOTE
LLE >R; secondary to PVD  Repeat blood cultures  Rocephin IV Q12 based on last blood culture +GroupG strep  Vascular surgery consulted - see jenna BEAVER have angiogram with possible stent placement today.  Pain control  Wound care consulted   Will have debridement today.

## 2018-11-14 NOTE — PLAN OF CARE
Problem: Fall Risk (Adult)  Intervention: Safety Promotion/Fall Prevention   11/14/18 0100   Safety Interventions   Safety Promotion/Fall Prevention assistive device/personal item within reach;bed alarm set;diversional activities provided;Fall Risk reviewed with patient/family;lighting adjusted;medications reviewed;nonskid shoes/socks when out of bed;room near unit station;side rails raised x 2;instructed to call staff for mobility

## 2018-11-14 NOTE — PT/OT/SLP PROGRESS
Physical Therapy      Patient Name:  Marvin Ray   MRN:  [id number]    Patient not seen today secondary to DANDY for sx debridment. Will follow-up 11/15/2018.    Consuelo Foote, PT

## 2018-11-14 NOTE — PROGRESS NOTES
Report received from off going nurse, JILLIAN Andrade. Patient AAO. No signs of distress noted. Call light in reach. Bed low and locked. Will continue to monitor.

## 2018-11-14 NOTE — SUBJECTIVE & OBJECTIVE
Medications:  Continuous Infusions:   sodium chloride 0.9% 50 mL/hr at 11/14/18 0307     Scheduled Meds:   amLODIPine  5 mg Oral Daily    aspirin  81 mg Oral Daily    brimonidine 0.1%  1 drop Both Eyes TID    carvedilol  6.25 mg Oral BID    cefTRIAXone (ROCEPHIN) IVPB  1 g Intravenous Q12H    clindamycin (CLEOCIN) IVPB  600 mg Intravenous Once    colchicine  0.3 mg Oral Daily    dorzolamide-timolol 2-0.5%  1 drop Both Eyes BID    enoxaparin  40 mg Subcutaneous Daily    ezetimibe  10 mg Oral Daily    furosemide  20 mg Oral Daily    insulin aspart U-100  5 Units Subcutaneous TIDWM    lactulose  10 g Oral BID    prednisoLONE acetate  1 drop Right Eye TID    senna-docusate 8.6-50 mg  1 tablet Oral BID    sodium bicarbonate  650 mg Oral BID     PRN Meds:acetaminophen, dextrose 50%, dextrose 50%, fentaNYL, glucagon (human recombinant), glucose, glucose, HYDROmorphone, influenza, insulin aspart U-100, oxyCODONE-acetaminophen, sodium chloride 0.9%, sodium chloride 0.9%     Objective:     Vital Signs (Most Recent):  Temp: 98 °F (36.7 °C) (11/14/18 1537)  Pulse: 83 (11/14/18 1537)  Resp: 18 (11/14/18 1537)  BP: 126/78 (11/14/18 1537)  SpO2: (!) 93 % (11/14/18 1537) Vital Signs (24h Range):  Temp:  [97.4 °F (36.3 °C)-98 °F (36.7 °C)] 98 °F (36.7 °C)  Pulse:  [76-92] 83  Resp:  [15-22] 18  SpO2:  [90 %-98 %] 93 %  BP: (115-163)/(65-87) 126/78     Date 11/14/18 0700 - 11/15/18 0659   Shift 6929-1765 0208-6206 4424-0796 24 Hour Total   INTAKE   I.V.(mL/kg) 250(2.5)   250(2.5)   Shift Total(mL/kg) 250(2.5)   250(2.5)   OUTPUT   Shift Total(mL/kg)       Weight (kg) 100.1 100.1 100.1 100.1       Physical Exam   Constitutional: He is oriented to person, place, and time. He appears well-developed and well-nourished. No distress.   HENT:   Head: Normocephalic and atraumatic.   Eyes: Conjunctivae are normal.   Neck: Neck supple.   Cardiovascular: Normal rate.   Pulses:       Femoral pulses are 2+ on the right side,  and 2+ on the left side.       Dorsalis pedis pulses are Detected w/ doppler on the right side, and Detected w/ doppler on the left side.        Posterior tibial pulses are Detected w/ doppler on the right side, and Detected w/ doppler on the left side.   Pulmonary/Chest: Effort normal. No respiratory distress. He exhibits no tenderness.   Abdominal: Soft. He exhibits no distension and no mass. There is no tenderness. There is no rebound and no guarding.   Musculoskeletal: Normal range of motion. He exhibits no edema, tenderness or deformity.   Feet:   Right Foot:   Skin Integrity: Negative for ulcer, blister, skin breakdown, callus or dry skin.   Left Foot:   Skin Integrity: Positive for ulcer, blister, skin breakdown, callus and dry skin.   Neurological: He is alert and oriented to person, place, and time. No sensory deficit.   Skin: Skin is warm and dry. Capillary refill takes less than 2 seconds. No rash noted. No erythema. No pallor.   Psychiatric: He has a normal mood and affect.   Vitals reviewed.      Significant Labs:  All pertinent labs from the last 24 hours have been reviewed.    Significant Diagnostics:  I have reviewed all pertinent imaging results/findings within the past 24 hours.

## 2018-11-14 NOTE — PROGRESS NOTES
Patient returned from procedure. Telemetry monitor continued. Patient accompanied back in bed. Denies pain or discomfort. Will continue to monitor.    Wound post procedure wrapped. Dressing clean dry and intact, denies any pain or discomfort. Will continue to monitor.

## 2018-11-14 NOTE — PLAN OF CARE
Problem: Patient Care Overview  Goal: Plan of Care Review   11/14/18 1430   Coping/Psychosocial   Plan Of Care Reviewed With patient

## 2018-11-15 ENCOUNTER — ANESTHESIA EVENT (OUTPATIENT)
Dept: SURGERY | Facility: HOSPITAL | Age: 58
DRG: 579 | End: 2018-11-15
Payer: COMMERCIAL

## 2018-11-15 LAB
ALBUMIN SERPL BCP-MCNC: 2 G/DL
ALP SERPL-CCNC: 193 U/L
ALT SERPL W/O P-5'-P-CCNC: 14 U/L
ANION GAP SERPL CALC-SCNC: 7 MMOL/L
AST SERPL-CCNC: 12 U/L
BILIRUB SERPL-MCNC: 0.7 MG/DL
BUN SERPL-MCNC: 22 MG/DL
CALCIUM SERPL-MCNC: 8.4 MG/DL
CHLORIDE SERPL-SCNC: 109 MMOL/L
CO2 SERPL-SCNC: 21 MMOL/L
CREAT SERPL-MCNC: 0.8 MG/DL
EST. GFR  (AFRICAN AMERICAN): >60 ML/MIN/1.73 M^2
EST. GFR  (NON AFRICAN AMERICAN): >60 ML/MIN/1.73 M^2
GLUCOSE SERPL-MCNC: 170 MG/DL
GRAM STN SPEC: NORMAL
GRAM STN SPEC: NORMAL
POCT GLUCOSE: 163 MG/DL (ref 70–110)
POCT GLUCOSE: 163 MG/DL (ref 70–110)
POCT GLUCOSE: 197 MG/DL (ref 70–110)
POCT GLUCOSE: 200 MG/DL (ref 70–110)
POTASSIUM SERPL-SCNC: 4.4 MMOL/L
PROT SERPL-MCNC: 7.1 G/DL
SODIUM SERPL-SCNC: 137 MMOL/L

## 2018-11-15 PROCEDURE — 36415 COLL VENOUS BLD VENIPUNCTURE: CPT

## 2018-11-15 PROCEDURE — 94761 N-INVAS EAR/PLS OXIMETRY MLT: CPT

## 2018-11-15 PROCEDURE — 25000003 PHARM REV CODE 250: Performed by: EMERGENCY MEDICINE

## 2018-11-15 PROCEDURE — 25000003 PHARM REV CODE 250: Performed by: HOSPITALIST

## 2018-11-15 PROCEDURE — 63600175 PHARM REV CODE 636 W HCPCS: Performed by: EMERGENCY MEDICINE

## 2018-11-15 PROCEDURE — 27000221 HC OXYGEN, UP TO 24 HOURS

## 2018-11-15 PROCEDURE — 25000003 PHARM REV CODE 250: Performed by: INTERNAL MEDICINE

## 2018-11-15 PROCEDURE — 99024 POSTOP FOLLOW-UP VISIT: CPT | Mod: ,,, | Performed by: SURGERY

## 2018-11-15 PROCEDURE — 80053 COMPREHEN METABOLIC PANEL: CPT

## 2018-11-15 PROCEDURE — 21400001 HC TELEMETRY ROOM

## 2018-11-15 RX ADMIN — BRIMONIDINE TARTRATE 1 DROP: 1 SOLUTION/ DROPS OPHTHALMIC at 08:11

## 2018-11-15 RX ADMIN — CARVEDILOL 6.25 MG: 6.25 TABLET, FILM COATED ORAL at 08:11

## 2018-11-15 RX ADMIN — INSULIN ASPART 5 UNITS: 100 INJECTION, SOLUTION INTRAVENOUS; SUBCUTANEOUS at 09:11

## 2018-11-15 RX ADMIN — DORZOLAMIDE HYDROCHLORIDE AND TIMOLOL MALEATE 1 DROP: 20; 5 SOLUTION/ DROPS OPHTHALMIC at 09:11

## 2018-11-15 RX ADMIN — PREDNISOLONE ACETATE 1 DROP: 10 SUSPENSION OPHTHALMIC at 09:11

## 2018-11-15 RX ADMIN — CEFTRIAXONE 1 G: 1 INJECTION, SOLUTION INTRAVENOUS at 12:11

## 2018-11-15 RX ADMIN — SODIUM BICARBONATE 650 MG: 325 TABLET ORAL at 08:11

## 2018-11-15 RX ADMIN — STANDARDIZED SENNA CONCENTRATE AND DOCUSATE SODIUM 1 TABLET: 8.6; 5 TABLET, FILM COATED ORAL at 08:11

## 2018-11-15 RX ADMIN — INSULIN ASPART 5 UNITS: 100 INJECTION, SOLUTION INTRAVENOUS; SUBCUTANEOUS at 01:11

## 2018-11-15 RX ADMIN — BRIMONIDINE TARTRATE 1 DROP: 1 SOLUTION/ DROPS OPHTHALMIC at 09:11

## 2018-11-15 RX ADMIN — CEFTRIAXONE 1 G: 1 INJECTION, SOLUTION INTRAVENOUS at 01:11

## 2018-11-15 RX ADMIN — ENOXAPARIN SODIUM 40 MG: 100 INJECTION SUBCUTANEOUS at 04:11

## 2018-11-15 RX ADMIN — OXYCODONE HYDROCHLORIDE AND ACETAMINOPHEN 1 TABLET: 7.5; 325 TABLET ORAL at 01:11

## 2018-11-15 RX ADMIN — INSULIN ASPART 5 UNITS: 100 INJECTION, SOLUTION INTRAVENOUS; SUBCUTANEOUS at 04:11

## 2018-11-15 RX ADMIN — DORZOLAMIDE HYDROCHLORIDE AND TIMOLOL MALEATE 1 DROP: 20; 5 SOLUTION/ DROPS OPHTHALMIC at 08:11

## 2018-11-15 RX ADMIN — PREDNISOLONE ACETATE 1 DROP: 10 SUSPENSION OPHTHALMIC at 04:11

## 2018-11-15 RX ADMIN — PREDNISOLONE ACETATE 1 DROP: 10 SUSPENSION OPHTHALMIC at 08:11

## 2018-11-15 RX ADMIN — OXYCODONE HYDROCHLORIDE AND ACETAMINOPHEN 1 TABLET: 7.5; 325 TABLET ORAL at 06:11

## 2018-11-15 RX ADMIN — SODIUM CHLORIDE: 0.9 INJECTION, SOLUTION INTRAVENOUS at 01:11

## 2018-11-15 RX ADMIN — OXYCODONE HYDROCHLORIDE AND ACETAMINOPHEN 1 TABLET: 7.5; 325 TABLET ORAL at 11:11

## 2018-11-15 RX ADMIN — BRIMONIDINE TARTRATE 1 DROP: 1 SOLUTION/ DROPS OPHTHALMIC at 04:11

## 2018-11-15 NOTE — ANESTHESIA POSTPROCEDURE EVALUATION
"Anesthesia Post Evaluation    Patient: Marvin Ray    Procedure(s) Performed: Procedure(s) (LRB):  Incision and Drainage, left lower extremity debridement, washout (Left)    Final Anesthesia Type: general  Patient location during evaluation: PACU  Patient participation: Yes- Able to Participate  Level of consciousness: awake and alert, oriented and awake  Post-procedure vital signs: reviewed and stable  Pain management: adequate  Airway patency: patent  PONV status at discharge: No PONV  Anesthetic complications: no      Cardiovascular status: blood pressure returned to baseline, hemodynamically stable and stable  Respiratory status: unassisted and spontaneous ventilation  Hydration status: euvolemic  Follow-up not needed.        Visit Vitals  /82   Pulse 88   Temp 36.9 °C (98.5 °F)   Resp 16   Ht 5' 10" (1.778 m)   Wt 100.1 kg (220 lb 10.9 oz)   SpO2 95%   BMI 31.66 kg/m²       Pain/Ralf Score: Pain Assessment Performed: Yes (11/15/2018  6:08 AM)  Presence of Pain: complains of pain/discomfort (11/15/2018  6:08 AM)  Pain Rating Prior to Med Admin: 6 (11/15/2018  6:08 AM)  Pain Rating Post Med Admin: 2 (11/14/2018  7:16 AM)  Ralf Score: 9 (11/14/2018  2:35 PM)        "

## 2018-11-15 NOTE — PROGRESS NOTES
Patient resting with eyes closed. Rise and fall of chest noted. Report given to oncoming nurse. 12hour chart check complete.

## 2018-11-15 NOTE — PT/OT/SLP PROGRESS
Physical Therapy      Patient Name:  Marvin Ray   MRN:  [id number]    Patient not seen today for PT eval secondary to Other (Pt scheduled for further L foot wound debridement and toes amputation today.). Please advise on weight bearing status s/p surgery, thank you. Will follow-up tomorrow as appropriate.    Mervat Seo, PT

## 2018-11-15 NOTE — PLAN OF CARE
Patient scheduled for left toe amputation today.        11/15/18 1108   Discharge Reassessment   Assessment Type Discharge Planning Reassessment   Provided patient/caregiver education on the expected discharge date and the discharge plan Yes   Do you have any problems affording any of your prescribed medications? Yes   Discharge Plan A Home;Home with family;Home Health   Discharge Plan B (tbd)   Patient choice form signed by patient/caregiver N/A   Can the patient answer the patient profile reliably? Yes, cognitively intact   How does the patient rate their overall health at the present time? Fair   Describe the patient's ability to walk at the present time. Walks with the help of equipment   How often would a person be available to care for the patient? Whenever needed   During the past month, has the patient often been bothered by feeling down, depressed or hopeless? No   During the past month, has the patient often been bothered by little interest or pleasure in doing things? No

## 2018-11-15 NOTE — PLAN OF CARE
Problem: Patient Care Overview  Goal: Plan of Care Review  Plan of care reviewed with patient  at bedside. All questions answered. Fall precautions are in place. Urinal at bedside. Call light within reach. Bed in lowest position.  No complaints throughout the night. PIV intact infusing NS at 50. Dressing to LLE. NSR. Will continue to monitor.

## 2018-11-15 NOTE — OP NOTE
DATE OF PROCEDURE:  11/14/2018    SERVICE:  Vascular surgery.    PREOPERATIVE DIAGNOSIS:  1.  Left lower extremity atherosclerosis with left foot wound with necrosis of   underlying musculature. Wound 22 cm x 18 cm x 1 cm  2.  Hypertension.  3.  Hyperlipidemia.  4.  History of ascites.  5.  Uncontrolled diabetes mellitus.    POSTOPERATIVE DIAGNOSES:  1.  Left lower extremity atherosclerosis with left foot wound with necrosis of   underlying musculature.  2.  Hypertension.  3.  Hyperlipidemia.  4.  History of ascites.  5.  Uncontrolled diabetes mellitus.    PROCEDURE PERFORMED:  1.  Left foot wound debridement down to muscle.  2.  Left foot wound washout.    INDICATIONS FOR PROCEDURE:  This is a 58-year-old white male with the above   listed history who was admitted for a left foot wound.  On 11/13/2018, left   lower extremity endovascular revascularization was performed with improved blood   flow to the left foot.  It was felt that he would benefit from a left foot   debridement in the OR for pain control and for source control.  Risks and   benefits were discussed with the patient.  The patient stated understanding and   desired to proceed with surgery for limb salvage.    DESCRIPTION OF PROCEDURE:  The patient was seen preoperatively by anesthesia and   was deemed stable for surgery.  He was brought to the OR on 11/14/2018.  After   obtaining a regional block, left lower extremity block by Anesthesia.  His vital   signs are stable and he is without complaints.  He was placed supine on the   operating room table and a timeout was performed.  He was prepped and draped in   sterile fashion and preoperative antibiotics were continued.  The eschar   overlying the right foot wound was debrided off with a scalpel.  There were two   areas of deep infection on the dorsal aspect of the foot and these were   cultured.  Counterincisions were made and red rubber catheters were inserted   into these tracts.  All purulence was  expressed.  There was bleeding tissue   underlying the eschar and no further evidence of deep infection.  The toes were   noted to be dry and gangrenous, toes 2 through 5.  The heel wound was dry eschar   and this was not debrided off down to muscle.  Electrocautery was used to control bleeding as   well as manual pressure.  The wound was irrigated with bacitracin of saline for   a washout and a sterile wet-to-dry dressing with Xeroform was applied.  The   patient was then transferred to a stretcher and subsequently Recovery Room in   stable condition.    ESTIMATED BLOOD LOSS:  Less than 5 mL.    COMPLICATIONS:  None.    SEDATION:  Regional.      CCL/HN  dd: 11/14/2018 18:19:16 (CST)  td: 11/14/2018 19:11:50 (CST)  Doc ID   #[id number]  Job ID #[id number]    CC:

## 2018-11-15 NOTE — SUBJECTIVE & OBJECTIVE
Interval History: pt has no new complaints     Review of Systems   Constitutional: Negative for chills and fever.   HENT: Negative for congestion.    Eyes: Negative for visual disturbance.   Respiratory: Negative for shortness of breath.    Cardiovascular: Negative for chest pain.   Gastrointestinal: Negative for abdominal pain.   Endocrine: Negative for polyphagia.   Genitourinary: Negative for dysuria.   Musculoskeletal: Negative for back pain.   Skin: Positive for color change and wound. Negative for pallor and rash.   Allergic/Immunologic: Negative for immunocompromised state.   Neurological: Negative for weakness.   Hematological: Does not bruise/bleed easily.   Psychiatric/Behavioral: Negative for confusion.     Objective:     Vital Signs (Most Recent):  Temp: 98.5 °F (36.9 °C) (11/15/18 0759)  Pulse: 88 (11/15/18 0759)  Resp: 16 (11/15/18 0759)  BP: 127/82 (11/15/18 0759)  SpO2: 95 % (11/15/18 0759) Vital Signs (24h Range):  Temp:  [97.5 °F (36.4 °C)-98.5 °F (36.9 °C)] 98.5 °F (36.9 °C)  Pulse:  [76-89] 88  Resp:  [15-22] 16  SpO2:  [93 %-97 %] 95 %  BP: (121-142)/(69-82) 127/82     Weight: 100.1 kg (220 lb 10.9 oz)  Body mass index is 31.66 kg/m².    Intake/Output Summary (Last 24 hours) at 11/15/2018 1009  Last data filed at 11/14/2018 1857  Gross per 24 hour   Intake 490.83 ml   Output 500 ml   Net -9.17 ml      Physical Exam   Constitutional: He is oriented to person, place, and time. He appears well-developed and well-nourished. No distress.   HENT:   Head: Normocephalic and atraumatic.   Eyes: Conjunctivae are normal.   Neck: Neck supple.   Cardiovascular: Normal rate.   Pulses:       Femoral pulses are 2+ on the right side, and 2+ on the left side.       Dorsalis pedis pulses are Detected w/ doppler on the right side, and Detected w/ doppler on the left side.        Posterior tibial pulses are Detected w/ doppler on the right side, and Detected w/ doppler on the left side.   Pulmonary/Chest: Effort  normal. No respiratory distress. He exhibits no tenderness.   Abdominal: Soft. He exhibits no distension and no mass. There is no tenderness. There is no rebound and no guarding.   Musculoskeletal: Normal range of motion. He exhibits edema. He exhibits no tenderness or deformity.   Feet:   Right Foot:   Skin Integrity: Negative for ulcer, blister, skin breakdown, callus or dry skin.   Left Foot:   Skin Integrity: Positive for ulcer, blister, skin breakdown, callus and dry skin.   Neurological: He is alert and oriented to person, place, and time. No sensory deficit.   Skin: Skin is warm and dry. Capillary refill takes less than 2 seconds. No rash noted. No erythema. No pallor.   Psychiatric: He has a normal mood and affect.   Vitals reviewed.      Significant Labs:   BMP:   Recent Labs   Lab 11/15/18  0512   *      K 4.4      CO2 21*   BUN 22*   CREATININE 0.8   CALCIUM 8.4*       Significant Imaging: I have reviewed and interpreted all pertinent imaging results/findings within the past 24 hours.

## 2018-11-15 NOTE — PT/OT/SLP PROGRESS
Occupational Therapy      Patient Name:  Marvin Ray   MRN:  [id number]    Patient not seen today secondary to Not applicable(patient scheduled for further surgery today; debriedment and toe amputation). Will follow-up tomorrow.    ROSALVA Lauren, MS  11/15/2018

## 2018-11-15 NOTE — ASSESSMENT & PLAN NOTE
LLE >R; secondary to PVD  Repeat blood cultures  Rocephin IV Q12 based on last blood culture +GroupG strep  Vascular surgery consulted - see SD,jenna have angiogram with possible stent placement today.  Pain control  Wound care consulted   S/P wound debridemnemnt,intraoprative cultures are pending.  Will have left toe amputation today.

## 2018-11-15 NOTE — SUBJECTIVE & OBJECTIVE
Medications:  Continuous Infusions:   sodium chloride 0.9% 50 mL/hr at 11/14/18 1708     Scheduled Meds:   amLODIPine  5 mg Oral Daily    aspirin  81 mg Oral Daily    brimonidine 0.1%  1 drop Both Eyes TID    carvedilol  6.25 mg Oral BID    cefTRIAXone (ROCEPHIN) IVPB  1 g Intravenous Q12H    clindamycin (CLEOCIN) IVPB  600 mg Intravenous Once    colchicine  0.3 mg Oral Daily    dorzolamide-timolol 2-0.5%  1 drop Both Eyes BID    enoxaparin  40 mg Subcutaneous Daily    ezetimibe  10 mg Oral Daily    furosemide  20 mg Oral Daily    insulin aspart U-100  5 Units Subcutaneous TIDWM    prednisoLONE acetate  1 drop Right Eye TID    senna-docusate 8.6-50 mg  1 tablet Oral BID    sodium bicarbonate  650 mg Oral BID     PRN Meds:acetaminophen, dextrose 50%, dextrose 50%, glucagon (human recombinant), glucose, glucose, influenza, insulin aspart U-100, oxyCODONE-acetaminophen, sodium chloride 0.9%, sodium chloride 0.9%     Objective:     Vital Signs (Most Recent):  Temp: 98.5 °F (36.9 °C) (11/15/18 0759)  Pulse: 88 (11/15/18 0759)  Resp: 16 (11/15/18 0759)  BP: 127/82 (11/15/18 0759)  SpO2: 95 % (11/15/18 0759) Vital Signs (24h Range):  Temp:  [97.5 °F (36.4 °C)-98.5 °F (36.9 °C)] 98.5 °F (36.9 °C)  Pulse:  [76-89] 88  Resp:  [15-22] 16  SpO2:  [93 %-97 %] 95 %  BP: (121-142)/(69-82) 127/82          Physical Exam   Constitutional: He is oriented to person, place, and time. He appears well-developed and well-nourished. No distress.   HENT:   Head: Normocephalic and atraumatic.   Eyes: Conjunctivae are normal.   Neck: Neck supple.   Cardiovascular: Normal rate.   Pulses:       Femoral pulses are 2+ on the right side, and 2+ on the left side.       Dorsalis pedis pulses are Detected w/ doppler on the right side, and Detected w/ doppler on the left side.        Posterior tibial pulses are Detected w/ doppler on the right side, and Detected w/ doppler on the left side.   Pulmonary/Chest: Effort normal. No  respiratory distress. He exhibits no tenderness.   Abdominal: Soft. He exhibits no distension and no mass. There is no tenderness. There is no rebound and no guarding.   Musculoskeletal: Normal range of motion. He exhibits no edema, tenderness or deformity.   Feet:   Right Foot:   Skin Integrity: Negative for ulcer, blister, skin breakdown, callus or dry skin.   Left Foot:   Skin Integrity: Positive for ulcer, blister, skin breakdown, callus and dry skin.   Neurological: He is alert and oriented to person, place, and time. No sensory deficit.   Skin: Skin is warm and dry. Capillary refill takes less than 2 seconds. No rash noted. No erythema. No pallor.   Psychiatric: He has a normal mood and affect.   Vitals reviewed.      Significant Labs:  All pertinent labs from the last 24 hours have been reviewed.    Significant Diagnostics:  I have reviewed all pertinent imaging results/findings within the past 24 hours.

## 2018-11-15 NOTE — PLAN OF CARE
Problem: Diabetes, Type 2 (Adult)  Intervention: Support/Optimize Psychosocial Response to Condition   11/15/18 1332   Coping/Psychosocial Interventions   Supportive Measures active listening utilized;relaxation techniques promoted   Environmental Support calm environment promoted     Intervention: Optimize Glycemic Control   11/15/18 1332   Nutrition Interventions   Glycemic Management blood glucose monitoring       Goal: Signs and Symptoms of Listed Potential Problems Will be Absent, Minimized or Managed (Diabetes, Type 2)  Signs and symptoms of listed potential problems will be absent, minimized or managed by discharge/transition of care (reference Diabetes, Type 2 (Adult) CPG).  Outcome: Ongoing (interventions implemented as appropriate)   11/15/18 1332   Diabetes, Type 2   Problems Assessed (Type 2 Diabetes) all   Problems Present (Type 2 Diabetes) hyperglycemia

## 2018-11-15 NOTE — PROGRESS NOTES
Report received from off going nurse, JILLIAN Armstrong. Patient AAO. No signs of distress noted. Call light in reach. Bed low and locked. Will continue to monitor.

## 2018-11-15 NOTE — ASSESSMENT & PLAN NOTE
Vascular surgery consulted  Pain control     S/P angiogram,on 11.13.18 ,1. US guided R CFA access  2. Aortogram   3. LLE angiogram  4. L SFA angioplasty with a 5 x 40 mm [place] balloon  5. L AT angioplasty with a 2 x 220 mm [place] balloon  6. L peroneal angioplasty with a 2 x 80 mm [place] balloon  7. Moderate sedation,  S/P wound debridemnemnt,intraoprative cultures are pending.  Will have left toe amputation today.

## 2018-11-15 NOTE — ASSESSMENT & PLAN NOTE
-L foot wound multifactorial likely due to DM, PVD and venous insufficiency s/p LLE angiography with SFA, AT and peroneal angioplasty s/p L foot washout/debridement 11/14/18 - would benefit from further debridement and toe amputations; discussed risks/benefits with pt and family who state understanding and desire to proceed with surgery  -NPO today  -Cont wound care consult recs  -Cont offloading  -Abx per primary team  -Strict glycemic control

## 2018-11-15 NOTE — PROGRESS NOTES
[facility]  Vascular Surgery  Progress Note    Patient Name: Marvin Ray  MRN: [id number]  Admission Date: 11/8/2018  Primary Care Provider: Rubén Davis MD    Subjective:     Interval History: s/p L foot debridement 11/14/18    Post-Op Info:  Procedure(s) (LRB):  Incision and Drainage, left lower extremity debridement, washout (Left)   1 Day Post-Op       Medications:  Continuous Infusions:   sodium chloride 0.9% 50 mL/hr at 11/14/18 1708     Scheduled Meds:   amLODIPine  5 mg Oral Daily    aspirin  81 mg Oral Daily    brimonidine 0.1%  1 drop Both Eyes TID    carvedilol  6.25 mg Oral BID    cefTRIAXone (ROCEPHIN) IVPB  1 g Intravenous Q12H    clindamycin (CLEOCIN) IVPB  600 mg Intravenous Once    colchicine  0.3 mg Oral Daily    dorzolamide-timolol 2-0.5%  1 drop Both Eyes BID    enoxaparin  40 mg Subcutaneous Daily    ezetimibe  10 mg Oral Daily    furosemide  20 mg Oral Daily    insulin aspart U-100  5 Units Subcutaneous TIDWM    prednisoLONE acetate  1 drop Right Eye TID    senna-docusate 8.6-50 mg  1 tablet Oral BID    sodium bicarbonate  650 mg Oral BID     PRN Meds:acetaminophen, dextrose 50%, dextrose 50%, glucagon (human recombinant), glucose, glucose, influenza, insulin aspart U-100, oxyCODONE-acetaminophen, sodium chloride 0.9%, sodium chloride 0.9%     Objective:     Vital Signs (Most Recent):  Temp: 98.5 °F (36.9 °C) (11/15/18 0759)  Pulse: 88 (11/15/18 0759)  Resp: 16 (11/15/18 0759)  BP: 127/82 (11/15/18 0759)  SpO2: 95 % (11/15/18 0759) Vital Signs (24h Range):  Temp:  [97.5 °F (36.4 °C)-98.5 °F (36.9 °C)] 98.5 °F (36.9 °C)  Pulse:  [76-89] 88  Resp:  [15-22] 16  SpO2:  [93 %-97 %] 95 %  BP: (121-142)/(69-82) 127/82          Physical Exam   Constitutional: He is oriented to person, place, and time. He appears well-developed and well-nourished. No distress.   HENT:   Head: Normocephalic and atraumatic.   Eyes: Conjunctivae are normal.   Neck: Neck supple.    Cardiovascular: Normal rate.   Pulses:       Femoral pulses are 2+ on the right side, and 2+ on the left side.       Dorsalis pedis pulses are Detected w/ doppler on the right side, and Detected w/ doppler on the left side.        Posterior tibial pulses are Detected w/ doppler on the right side, and Detected w/ doppler on the left side.   Pulmonary/Chest: Effort normal. No respiratory distress. He exhibits no tenderness.   Abdominal: Soft. He exhibits no distension and no mass. There is no tenderness. There is no rebound and no guarding.   Musculoskeletal: Normal range of motion. He exhibits no edema, tenderness or deformity.   Feet:   Right Foot:   Skin Integrity: Negative for ulcer, blister, skin breakdown, callus or dry skin.   Left Foot:   Skin Integrity: Positive for ulcer, blister, skin breakdown, callus and dry skin.   Neurological: He is alert and oriented to person, place, and time. No sensory deficit.   Skin: Skin is warm and dry. Capillary refill takes less than 2 seconds. No rash noted. No erythema. No pallor.   Psychiatric: He has a normal mood and affect.   Vitals reviewed.      Significant Labs:  All pertinent labs from the last 24 hours have been reviewed.    Significant Diagnostics:  I have reviewed all pertinent imaging results/findings within the past 24 hours.    Assessment/Plan:     Open wound of left foot    -L foot wound multifactorial likely due to DM, PVD and venous insufficiency s/p LLE angiography with SFA, AT and peroneal angioplasty s/p L foot washout/debridement 11/14/18 - would benefit from further debridement and toe amputations; discussed risks/benefits with pt and family who state understanding and desire to proceed with surgery  -NPO today  -Cont wound care consult recs  -Cont offloading  -Abx per primary team  -Strict glycemic control         Mitch Chan MD  Vascular Surgery  [facility]

## 2018-11-15 NOTE — PROGRESS NOTES
[facility] Medicine  Progress Note    Patient Name: Marvin Ray  MRN: [id number]  Patient Class: IP- Inpatient   Admission Date: 11/8/2018  Length of Stay: 7 days  Attending Physician: Tree Ortega MD  Primary Care Provider: Rubén Davis MD        Subjective:     Principal Problem:Cellulitis of left lower leg    HPI:  59 yo male with CAD, HLP, hepatosplenomegaly/ascites, DM2 with CKD3 and retinopathy, PVD, gout, chronic combined CHF and chronic BLE wounds L>R presented from Dr. Chan office with concern for nonhealing wound to LLE. He was recently given bactrim and presented today with LAYNE, hyperkalemia and increased pain and edema to LLE. Pt has h/o Group G strep bacteremia (10/10). ICD implanted 11/2.  The patient's sister reports doing dressing changes of LE wounds 5 times a day. Noticeable weeping of fluid from the LE. Pt made NPO by vascular surgery and plan for angiogram and possible stent placement to LE to assist with better LE wound healing.     Hospital Course:  PT admitted with non-healing Left foot wounds. Plan for angiogram  By vascular surgery cancelled due to hyperkalemia/LAYNE.  Pt received kayexalate x 2 and  Potassium persistently high. Add calcium gluconate x one dose and IV lasix. Monitor K levels and plan for possible intervention in next few days if labs improve. Wound care consulted.     11/10- serum potassium elevated, check urine electrolytes; calcium IV given, pt frustrated about being in hospital and not able to ambulate on his own   11/11- persistent hyperkalemia in setting of improving LAYNE; pt received IV lasix, kayexalate x2, calcium gluconate 1500mg; insulin and dextrose today, lasix IV again and albuterol high dose neb x one  11/12- potassium 4.9; nephrology added sodium bicarb; vascular surgery planning intervention with improved labs   S/P angiogram,on 11.13.18 ,1. US guided R CFA access  2. Aortogram   3. LLE angiogram  4. L SFA  angioplasty with a 5 x 40 mm [place] balloon  5. L AT angioplasty with a 2 x 220 mm [place] balloon  6. L peroneal angioplasty with a 2 x 80 mm [place] balloon  7. Moderate sedation,  S/P wound debridemnemnt,intraoprative cultures are pending.  Will have left toe amputation today.        Interval History: pt has no new complaints     Review of Systems   Constitutional: Negative for chills and fever.   HENT: Negative for congestion.    Eyes: Negative for visual disturbance.   Respiratory: Negative for shortness of breath.    Cardiovascular: Negative for chest pain.   Gastrointestinal: Negative for abdominal pain.   Endocrine: Negative for polyphagia.   Genitourinary: Negative for dysuria.   Musculoskeletal: Negative for back pain.   Skin: Positive for color change and wound. Negative for pallor and rash.   Allergic/Immunologic: Negative for immunocompromised state.   Neurological: Negative for weakness.   Hematological: Does not bruise/bleed easily.   Psychiatric/Behavioral: Negative for confusion.     Objective:     Vital Signs (Most Recent):  Temp: 98.5 °F (36.9 °C) (11/15/18 0759)  Pulse: 88 (11/15/18 0759)  Resp: 16 (11/15/18 0759)  BP: 127/82 (11/15/18 0759)  SpO2: 95 % (11/15/18 0759) Vital Signs (24h Range):  Temp:  [97.5 °F (36.4 °C)-98.5 °F (36.9 °C)] 98.5 °F (36.9 °C)  Pulse:  [76-89] 88  Resp:  [15-22] 16  SpO2:  [93 %-97 %] 95 %  BP: (121-142)/(69-82) 127/82     Weight: 100.1 kg (220 lb 10.9 oz)  Body mass index is 31.66 kg/m².    Intake/Output Summary (Last 24 hours) at 11/15/2018 1009  Last data filed at 11/14/2018 1857  Gross per 24 hour   Intake 490.83 ml   Output 500 ml   Net -9.17 ml      Physical Exam   Constitutional: He is oriented to person, place, and time. He appears well-developed and well-nourished. No distress.   HENT:   Head: Normocephalic and atraumatic.   Eyes: Conjunctivae are normal.   Neck: Neck supple.   Cardiovascular: Normal rate.   Pulses:       Femoral pulses are 2+ on the right  side, and 2+ on the left side.       Dorsalis pedis pulses are Detected w/ doppler on the right side, and Detected w/ doppler on the left side.        Posterior tibial pulses are Detected w/ doppler on the right side, and Detected w/ doppler on the left side.   Pulmonary/Chest: Effort normal. No respiratory distress. He exhibits no tenderness.   Abdominal: Soft. He exhibits no distension and no mass. There is no tenderness. There is no rebound and no guarding.   Musculoskeletal: Normal range of motion. He exhibits edema. He exhibits no tenderness or deformity.   Feet:   Right Foot:   Skin Integrity: Negative for ulcer, blister, skin breakdown, callus or dry skin.   Left Foot:   Skin Integrity: Positive for ulcer, blister, skin breakdown, callus and dry skin.   Neurological: He is alert and oriented to person, place, and time. No sensory deficit.   Skin: Skin is warm and dry. Capillary refill takes less than 2 seconds. No rash noted. No erythema. No pallor.   Psychiatric: He has a normal mood and affect.   Vitals reviewed.      Significant Labs:   BMP:   Recent Labs   Lab 11/15/18  0512   *      K 4.4      CO2 21*   BUN 22*   CREATININE 0.8   CALCIUM 8.4*       Significant Imaging: I have reviewed and interpreted all pertinent imaging results/findings within the past 24 hours.    Assessment/Plan:      * Cellulitis of left lower leg    LLE >R; secondary to PVD  Repeat blood cultures  Rocephin IV Q12 based on last blood culture +GroupG strep  Vascular surgery consulted - see PVD,wuparas have angiogram with possible stent placement today.  Pain control  Wound care consulted   S/P wound debridemnemnt,intraoprative cultures are pending.  Will have left toe amputation today.         Metabolic acidosis    Secondary to LAYNE  Bicarb given in ED, oral bicarb   Bmp in am        Acute renal failure superimposed on stage 3 chronic kidney disease    Pt has reported baseline CKD3  Recently placed on bactrim in  addition to acei and aldactone/lasix - hold offending agents  [place]entle IVF  BMP in am  Renal dose antibiotics  Avoid nephrotoxins        Open wound of left foot    Wound care consulted  See above        Chronic combined systolic and diastolic heart failure    EF 20%,No acute issues  Hold aldactone and acei insetting of hyperkalemia and LAYNE   Resume BB  S/p ICD 11/2       Stage 3 chronic kidney disease    Will be monitored.       Hepatosplenomegaly    Recent paracentesis - negative culture   Monitor CMP             PVD (peripheral vascular disease)        Vascular surgery consulted  Pain control     S/P angiogram,on 11.13.18 ,1. US guided R CFA access  2. Aortogram   3. LLE angiogram  4. L SFA angioplasty with a 5 x 40 mm [place] balloon  5. L AT angioplasty with a 2 x 220 mm [place] balloon  6. L peroneal angioplasty with a 2 x 80 mm [place] balloon  7. Moderate sedation,  S/P wound debridemnemnt,intraoprative cultures are pending.  Will have left toe amputation today.     CAD (coronary artery disease)    No acute issues  Resume aspirin and zetia  Statin intolerance         Essential hypertension      Controlled  Resume amlodipine and BB  Hold aldactone and acei as stated above      DM type 2 with diabetic peripheral neuropathy    Uncontrolled, A1c 8.3%  Basal -bolus insulin   Diabetic diet             VTE Risk Mitigation (From admission, onward)        Ordered     enoxaparin injection 40 mg  Daily      11/08/18 1501     IP VTE HIGH RISK PATIENT  Once      11/08/18 1501              Tree Ortega MD  Department of Hospital Medicine   [facility]

## 2018-11-15 NOTE — PROGRESS NOTES
Patient lying in bed aaox4. Denies any pain or discomfort. Dr. Chan seen patient today and did a little wound care to left foot. Nurse reinforced dressing due to drainage. Patient will be going down for surgery to foot. Family at bedside for support. Tolerate meds whole. Call light in reach. Bed low and locked. Will continue to monitor.

## 2018-11-16 ENCOUNTER — ANESTHESIA (OUTPATIENT)
Dept: SURGERY | Facility: HOSPITAL | Age: 58
DRG: 579 | End: 2018-11-16
Payer: COMMERCIAL

## 2018-11-16 PROBLEM — L03.116 CELLULITIS OF LEFT LOWER LEG: Status: RESOLVED | Noted: 2018-11-08 | Resolved: 2018-11-16

## 2018-11-16 PROBLEM — E87.20 METABOLIC ACIDOSIS: Status: RESOLVED | Noted: 2018-11-08 | Resolved: 2018-11-16

## 2018-11-16 PROBLEM — E87.5 HYPERKALEMIA: Status: ACTIVE | Noted: 2018-11-16

## 2018-11-16 LAB
ALBUMIN SERPL BCP-MCNC: 2.2 G/DL
ALP SERPL-CCNC: 209 U/L
ALT SERPL W/O P-5'-P-CCNC: 17 U/L
ANION GAP SERPL CALC-SCNC: 7 MMOL/L
AST SERPL-CCNC: 16 U/L
BACTERIA SPEC AEROBE CULT: NORMAL
BILIRUB SERPL-MCNC: 0.6 MG/DL
BUN SERPL-MCNC: 27 MG/DL
CALCIUM SERPL-MCNC: 8.5 MG/DL
CHLORIDE SERPL-SCNC: 107 MMOL/L
CO2 SERPL-SCNC: 20 MMOL/L
CREAT SERPL-MCNC: 0.8 MG/DL
EST. GFR  (AFRICAN AMERICAN): >60 ML/MIN/1.73 M^2
EST. GFR  (NON AFRICAN AMERICAN): >60 ML/MIN/1.73 M^2
GLUCOSE SERPL-MCNC: 140 MG/DL
GRAM STN SPEC: NORMAL
POCT GLUCOSE: 155 MG/DL (ref 70–110)
POCT GLUCOSE: 184 MG/DL (ref 70–110)
POCT GLUCOSE: 217 MG/DL (ref 70–110)
POCT GLUCOSE: 221 MG/DL (ref 70–110)
POTASSIUM SERPL-SCNC: 4.6 MMOL/L
PROT SERPL-MCNC: 7.3 G/DL
SODIUM SERPL-SCNC: 134 MMOL/L

## 2018-11-16 PROCEDURE — 63600175 PHARM REV CODE 636 W HCPCS: Performed by: NURSE ANESTHETIST, CERTIFIED REGISTERED

## 2018-11-16 PROCEDURE — 87147 CULTURE TYPE IMMUNOLOGIC: CPT | Mod: 59

## 2018-11-16 PROCEDURE — 87102 FUNGUS ISOLATION CULTURE: CPT

## 2018-11-16 PROCEDURE — 0QBP0ZZ EXCISION OF LEFT METATARSAL, OPEN APPROACH: ICD-10-PCS | Performed by: SURGERY

## 2018-11-16 PROCEDURE — 87107 FUNGI IDENTIFICATION MOLD: CPT

## 2018-11-16 PROCEDURE — S0020 INJECTION, BUPIVICAINE HYDRO: HCPCS | Performed by: ANESTHESIOLOGY

## 2018-11-16 PROCEDURE — 88305 TISSUE EXAM BY PATHOLOGIST: CPT | Performed by: PATHOLOGY

## 2018-11-16 PROCEDURE — 87070 CULTURE OTHR SPECIMN AEROBIC: CPT

## 2018-11-16 PROCEDURE — G8978 MOBILITY CURRENT STATUS: HCPCS | Mod: CK

## 2018-11-16 PROCEDURE — 27200750 HC INSULATED NEEDLE/ STIMUPLEX: Performed by: ANESTHESIOLOGY

## 2018-11-16 PROCEDURE — 64450 NJX AA&/STRD OTHER PN/BRANCH: CPT | Mod: 59,LT,, | Performed by: ANESTHESIOLOGY

## 2018-11-16 PROCEDURE — 71000033 HC RECOVERY, INTIAL HOUR: Performed by: SURGERY

## 2018-11-16 PROCEDURE — 80053 COMPREHEN METABOLIC PANEL: CPT

## 2018-11-16 PROCEDURE — 21400001 HC TELEMETRY ROOM

## 2018-11-16 PROCEDURE — 63600175 PHARM REV CODE 636 W HCPCS: Performed by: EMERGENCY MEDICINE

## 2018-11-16 PROCEDURE — 76942 ECHO GUIDE FOR BIOPSY: CPT | Performed by: ANESTHESIOLOGY

## 2018-11-16 PROCEDURE — 76942 ECHO GUIDE FOR BIOPSY: CPT | Mod: 26,,, | Performed by: ANESTHESIOLOGY

## 2018-11-16 PROCEDURE — D9220A PRA ANESTHESIA: Mod: ANES,,, | Performed by: ANESTHESIOLOGY

## 2018-11-16 PROCEDURE — 37000009 HC ANESTHESIA EA ADD 15 MINS: Performed by: SURGERY

## 2018-11-16 PROCEDURE — 87186 SC STD MICRODIL/AGAR DIL: CPT | Mod: 59

## 2018-11-16 PROCEDURE — 88311 DECALCIFY TISSUE: CPT | Performed by: PATHOLOGY

## 2018-11-16 PROCEDURE — 25000003 PHARM REV CODE 250: Performed by: INTERNAL MEDICINE

## 2018-11-16 PROCEDURE — 88311 DECALCIFY TISSUE: CPT | Mod: 26,,, | Performed by: PATHOLOGY

## 2018-11-16 PROCEDURE — 87107 FUNGI IDENTIFICATION MOLD: CPT | Mod: 59

## 2018-11-16 PROCEDURE — 28810 AMPUTATION TOE & METATARSAL: CPT | Mod: ,,, | Performed by: SURGERY

## 2018-11-16 PROCEDURE — 25000003 PHARM REV CODE 250: Performed by: ANESTHESIOLOGY

## 2018-11-16 PROCEDURE — 36415 COLL VENOUS BLD VENIPUNCTURE: CPT

## 2018-11-16 PROCEDURE — 37000008 HC ANESTHESIA 1ST 15 MINUTES: Performed by: SURGERY

## 2018-11-16 PROCEDURE — G8988 SELF CARE GOAL STATUS: HCPCS | Mod: CJ

## 2018-11-16 PROCEDURE — G8979 MOBILITY GOAL STATUS: HCPCS | Mod: CI

## 2018-11-16 PROCEDURE — 88305 TISSUE EXAM BY PATHOLOGIST: CPT | Mod: 26,,, | Performed by: PATHOLOGY

## 2018-11-16 PROCEDURE — G8987 SELF CARE CURRENT STATUS: HCPCS | Mod: CJ

## 2018-11-16 PROCEDURE — 25000003 PHARM REV CODE 250: Performed by: EMERGENCY MEDICINE

## 2018-11-16 PROCEDURE — D9220A PRA ANESTHESIA: Mod: CRNA,,, | Performed by: NURSE ANESTHETIST, CERTIFIED REGISTERED

## 2018-11-16 PROCEDURE — 25000003 PHARM REV CODE 250: Performed by: SURGERY

## 2018-11-16 PROCEDURE — 25000003 PHARM REV CODE 250: Performed by: HOSPITALIST

## 2018-11-16 PROCEDURE — G8989 SELF CARE D/C STATUS: HCPCS | Mod: CJ

## 2018-11-16 PROCEDURE — 87075 CULTR BACTERIA EXCEPT BLOOD: CPT | Mod: 59

## 2018-11-16 PROCEDURE — 25000003 PHARM REV CODE 250: Performed by: NURSE ANESTHETIST, CERTIFIED REGISTERED

## 2018-11-16 PROCEDURE — 36000706: Performed by: SURGERY

## 2018-11-16 PROCEDURE — 87205 SMEAR GRAM STAIN: CPT

## 2018-11-16 PROCEDURE — 94761 N-INVAS EAR/PLS OXIMETRY MLT: CPT

## 2018-11-16 PROCEDURE — 87077 CULTURE AEROBIC IDENTIFY: CPT | Mod: 59

## 2018-11-16 PROCEDURE — 82962 GLUCOSE BLOOD TEST: CPT | Performed by: SURGERY

## 2018-11-16 PROCEDURE — 97165 OT EVAL LOW COMPLEX 30 MIN: CPT

## 2018-11-16 PROCEDURE — 27000221 HC OXYGEN, UP TO 24 HOURS

## 2018-11-16 PROCEDURE — 97162 PT EVAL MOD COMPLEX 30 MIN: CPT

## 2018-11-16 PROCEDURE — 36000707: Performed by: SURGERY

## 2018-11-16 RX ORDER — BACITRACIN 50000 [IU]/1
INJECTION, POWDER, FOR SOLUTION INTRAMUSCULAR
Status: DISCONTINUED | OUTPATIENT
Start: 2018-11-16 | End: 2018-11-16 | Stop reason: HOSPADM

## 2018-11-16 RX ORDER — FENTANYL CITRATE 50 UG/ML
INJECTION, SOLUTION INTRAMUSCULAR; INTRAVENOUS
Status: DISCONTINUED | OUTPATIENT
Start: 2018-11-16 | End: 2018-11-16

## 2018-11-16 RX ORDER — HYDROMORPHONE HYDROCHLORIDE 2 MG/ML
0.2 INJECTION, SOLUTION INTRAMUSCULAR; INTRAVENOUS; SUBCUTANEOUS EVERY 5 MIN PRN
Status: DISCONTINUED | OUTPATIENT
Start: 2018-11-16 | End: 2018-11-21 | Stop reason: HOSPADM

## 2018-11-16 RX ORDER — MEPERIDINE HYDROCHLORIDE 50 MG/ML
12.5 INJECTION INTRAMUSCULAR; INTRAVENOUS; SUBCUTANEOUS ONCE AS NEEDED
Status: ACTIVE | OUTPATIENT
Start: 2018-11-16 | End: 2018-11-16

## 2018-11-16 RX ORDER — SODIUM CHLORIDE 0.9 % (FLUSH) 0.9 %
3 SYRINGE (ML) INJECTION
Status: DISCONTINUED | OUTPATIENT
Start: 2018-11-16 | End: 2018-11-21 | Stop reason: HOSPADM

## 2018-11-16 RX ORDER — LIDOCAINE HCL/PF 100 MG/5ML
SYRINGE (ML) INTRAVENOUS
Status: DISCONTINUED | OUTPATIENT
Start: 2018-11-16 | End: 2018-11-16

## 2018-11-16 RX ORDER — METOCLOPRAMIDE HYDROCHLORIDE 5 MG/ML
10 INJECTION INTRAMUSCULAR; INTRAVENOUS EVERY 10 MIN PRN
Status: DISCONTINUED | OUTPATIENT
Start: 2018-11-16 | End: 2018-11-21 | Stop reason: HOSPADM

## 2018-11-16 RX ORDER — SODIUM CHLORIDE 9 MG/ML
INJECTION, SOLUTION INTRAVENOUS CONTINUOUS PRN
Status: DISCONTINUED | OUTPATIENT
Start: 2018-11-16 | End: 2018-11-16

## 2018-11-16 RX ORDER — BUPIVACAINE HYDROCHLORIDE 5 MG/ML
INJECTION, SOLUTION EPIDURAL; INTRACAUDAL
Status: COMPLETED | OUTPATIENT
Start: 2018-11-16 | End: 2018-11-16

## 2018-11-16 RX ORDER — MIDAZOLAM HYDROCHLORIDE 1 MG/ML
INJECTION, SOLUTION INTRAMUSCULAR; INTRAVENOUS
Status: DISCONTINUED | OUTPATIENT
Start: 2018-11-16 | End: 2018-11-16

## 2018-11-16 RX ORDER — MORPHINE SULFATE 4 MG/ML
2 INJECTION, SOLUTION INTRAMUSCULAR; INTRAVENOUS EVERY 5 MIN PRN
Status: DISCONTINUED | OUTPATIENT
Start: 2018-11-16 | End: 2018-11-21 | Stop reason: HOSPADM

## 2018-11-16 RX ORDER — PROPOFOL 10 MG/ML
VIAL (ML) INTRAVENOUS
Status: DISCONTINUED | OUTPATIENT
Start: 2018-11-16 | End: 2018-11-16

## 2018-11-16 RX ORDER — OXYCODONE AND ACETAMINOPHEN 5; 325 MG/1; MG/1
1 TABLET ORAL
Status: DISCONTINUED | OUTPATIENT
Start: 2018-11-16 | End: 2018-11-21 | Stop reason: HOSPADM

## 2018-11-16 RX ADMIN — CEFTRIAXONE 1 G: 1 INJECTION, SOLUTION INTRAVENOUS at 03:11

## 2018-11-16 RX ADMIN — CARVEDILOL 6.25 MG: 6.25 TABLET, FILM COATED ORAL at 08:11

## 2018-11-16 RX ADMIN — INSULIN ASPART 1 UNITS: 100 INJECTION, SOLUTION INTRAVENOUS; SUBCUTANEOUS at 09:11

## 2018-11-16 RX ADMIN — MIDAZOLAM HYDROCHLORIDE 1 MG: 1 INJECTION, SOLUTION INTRAMUSCULAR; INTRAVENOUS at 07:11

## 2018-11-16 RX ADMIN — ENOXAPARIN SODIUM 40 MG: 100 INJECTION SUBCUTANEOUS at 06:11

## 2018-11-16 RX ADMIN — FENTANYL CITRATE 50 MCG: 50 INJECTION INTRAMUSCULAR; INTRAVENOUS at 08:11

## 2018-11-16 RX ADMIN — PREDNISOLONE ACETATE 1 DROP: 10 SUSPENSION OPHTHALMIC at 03:11

## 2018-11-16 RX ADMIN — INSULIN ASPART 5 UNITS: 100 INJECTION, SOLUTION INTRAVENOUS; SUBCUTANEOUS at 06:11

## 2018-11-16 RX ADMIN — DORZOLAMIDE HYDROCHLORIDE AND TIMOLOL MALEATE 1 DROP: 20; 5 SOLUTION/ DROPS OPHTHALMIC at 08:11

## 2018-11-16 RX ADMIN — BRIMONIDINE TARTRATE 1 DROP: 1 SOLUTION/ DROPS OPHTHALMIC at 08:11

## 2018-11-16 RX ADMIN — LIDOCAINE HYDROCHLORIDE 10 MG: 20 INJECTION, SOLUTION INTRAVENOUS at 08:11

## 2018-11-16 RX ADMIN — SODIUM BICARBONATE 650 MG: 325 TABLET ORAL at 08:11

## 2018-11-16 RX ADMIN — BRIMONIDINE TARTRATE 1 DROP: 1 SOLUTION/ DROPS OPHTHALMIC at 03:11

## 2018-11-16 RX ADMIN — INSULIN ASPART 2 UNITS: 100 INJECTION, SOLUTION INTRAVENOUS; SUBCUTANEOUS at 06:11

## 2018-11-16 RX ADMIN — BUPIVACAINE HYDROCHLORIDE 20 ML: 5 INJECTION, SOLUTION EPIDURAL; INTRACAUDAL; PERINEURAL at 07:11

## 2018-11-16 RX ADMIN — PROPOFOL 10 MG: 10 INJECTION, EMULSION INTRAVENOUS at 08:11

## 2018-11-16 RX ADMIN — SODIUM CHLORIDE: 0.9 INJECTION, SOLUTION INTRAVENOUS at 05:11

## 2018-11-16 RX ADMIN — CEFTRIAXONE 1 G: 1 INJECTION, SOLUTION INTRAVENOUS at 12:11

## 2018-11-16 RX ADMIN — PREDNISOLONE ACETATE 1 DROP: 10 SUSPENSION OPHTHALMIC at 08:11

## 2018-11-16 RX ADMIN — SODIUM CHLORIDE: 0.9 INJECTION, SOLUTION INTRAVENOUS at 07:11

## 2018-11-16 RX ADMIN — STANDARDIZED SENNA CONCENTRATE AND DOCUSATE SODIUM 1 TABLET: 8.6; 5 TABLET, FILM COATED ORAL at 08:11

## 2018-11-16 RX ADMIN — INSULIN ASPART 5 UNITS: 100 INJECTION, SOLUTION INTRAVENOUS; SUBCUTANEOUS at 11:11

## 2018-11-16 RX ADMIN — BUPIVACAINE HYDROCHLORIDE 25 ML: 5 INJECTION, SOLUTION EPIDURAL; INTRACAUDAL; PERINEURAL at 07:11

## 2018-11-16 RX ADMIN — OXYCODONE HYDROCHLORIDE AND ACETAMINOPHEN 1 TABLET: 7.5; 325 TABLET ORAL at 06:11

## 2018-11-16 NOTE — SUBJECTIVE & OBJECTIVE
Interval History: pt has no new complaints     Review of Systems   Constitutional: Negative for chills and fever.   HENT: Negative for congestion.    Eyes: Negative for visual disturbance.   Respiratory: Negative for shortness of breath.    Cardiovascular: Negative for chest pain.   Gastrointestinal: Negative for abdominal pain.   Endocrine: Negative for polyphagia.   Genitourinary: Negative for dysuria.   Musculoskeletal: Negative for back pain.   Skin: Positive for color change and wound. Negative for pallor and rash.   Allergic/Immunologic: Negative for immunocompromised state.   Neurological: Negative for weakness.   Hematological: Does not bruise/bleed easily.   Psychiatric/Behavioral: Negative for confusion.     Objective:     Vital Signs (Most Recent):  Temp: 97.5 °F (36.4 °C) (11/16/18 1055)  Pulse: 82 (11/16/18 1055)  Resp: 16 (11/16/18 1055)  BP: 131/80 (11/16/18 1055)  SpO2: 96 % (11/16/18 1055) Vital Signs (24h Range):  Temp:  [96.2 °F (35.7 °C)-97.7 °F (36.5 °C)] 97.5 °F (36.4 °C)  Pulse:  [74-88] 82  Resp:  [14-20] 16  SpO2:  [94 %-100 %] 96 %  BP: (122-166)/(65-87) 131/80     Weight: 100.1 kg (220 lb 10.9 oz)  Body mass index is 31.66 kg/m².    Intake/Output Summary (Last 24 hours) at 11/16/2018 1100  Last data filed at 11/16/2018 0930  Gross per 24 hour   Intake 2047.5 ml   Output 1000 ml   Net 1047.5 ml      Physical Exam   Constitutional: He is oriented to person, place, and time. He appears well-developed and well-nourished. No distress.   HENT:   Head: Normocephalic and atraumatic.   Eyes: Conjunctivae are normal.   Neck: Neck supple.   Cardiovascular: Normal rate.   Pulses:       Femoral pulses are 2+ on the right side, and 2+ on the left side.       Dorsalis pedis pulses are Detected w/ doppler on the right side, and Detected w/ doppler on the left side.        Posterior tibial pulses are Detected w/ doppler on the right side, and Detected w/ doppler on the left side.   Pulmonary/Chest: Effort  normal. No respiratory distress. He exhibits no tenderness.   Abdominal: Soft. He exhibits no distension and no mass. There is no tenderness. There is no rebound and no guarding.   Musculoskeletal: Normal range of motion. He exhibits edema. He exhibits no tenderness or deformity.   Feet:   Right Foot:   Skin Integrity: Negative for ulcer, blister, skin breakdown, callus or dry skin.   Left Foot:   Skin Integrity: Positive for ulcer, blister, skin breakdown, callus and dry skin.   Neurological: He is alert and oriented to person, place, and time. No sensory deficit.   Skin: Skin is warm and dry. Capillary refill takes less than 2 seconds. No rash noted. No erythema. No pallor.   Psychiatric: He has a normal mood and affect.   Vitals reviewed.      Significant Labs:   BMP:   Recent Labs   Lab 11/16/18  0531   *   *   K 4.6      CO2 20*   BUN 27*   CREATININE 0.8   CALCIUM 8.5*       Significant Imaging: I have reviewed and interpreted all pertinent imaging results/findings within the past 24 hours.

## 2018-11-16 NOTE — PT/OT/SLP EVAL
Occupational Therapy   Evaluation and Discharge Note    Name: Marvin Ray  MRN: [id number]  Admitting Diagnosis:  Cellulitis of left lower leg Day of Surgery    Recommendations:     Discharge Recommendations: home(defer to PT recs)  Discharge Equipment Recommendations:  none  Barriers to discharge:  None    History:     Occupational Profile:  Living Environment: lives with his sister in a 1 story house with a ramp entrance  Previous level of function: modified independent from a w/c  Roles and Routines: prior to wounds and NWB'ing status patient was driving  Equipment Owned:  wheelchair, walker, rolling, bedside commode, crutches, shower chair  Assistance upon Discharge: from his sister    Past Medical History:   Diagnosis Date    Arthritis     Cellulitis     CKD (chronic kidney disease), stage III     Coronary artery disease     Diabetes mellitus     Diabetic retinopathy     Diabetic ulcer of left foot     Glaucoma     Gout     Hyperlipemia     Hypertension     ICD (implantable cardioverter-defibrillator) in place 11/02/2018    Left chest    Non-pressure chronic ulcer of other part of left foot with fat layer exposed 10/23/2018    PVD (peripheral vascular disease)     Type 2 diabetes mellitus with left diabetic foot ulcer 10/29/2018    Unsteady gait     uses a wheelchair       Past Surgical History:   Procedure Laterality Date    AHMED GLAUCOMA IMPLANT Left 2011    [facility]    BAERVELDT GLAUCOMA IMPLANT Left 2012    WITH CATARACT EXTRACTION//DONE AT [facility]    CARDIAC CATHETERIZATION Left 05/2016    CARDIAC DEFIBRILLATOR PLACEMENT Left 11/02/2018    CATARACT EXTRACTION W/  INTRAOCULAR LENS IMPLANT Left 2012    WITH BAERVEDT//DONE AT [facility]    CATARACT EXTRACTION W/  INTRAOCULAR LENS IMPLANT Right 09/26/2018    COMPLEX ()    CB DESTRUCTION WITH CYCLO G6 Left 02/15/2017        CYST REMOVAL      HEART CATH-LEFT N/A 5/6/2016    Performed by Mike Magana,  "MD at [facility] CATH LAB    INCISION AND DRAINAGE Left 11/14/2018    Procedure: Incision and Drainage, left lower extremity debridement, washout;  Surgeon: Mitch Chan MD;  Location: [facility] OR;  Service: Vascular;  Laterality: Left;    Incision and Drainage, left lower extremity debridement, washout Left 11/14/2018    Performed by Mitch Chan MD at [facility] OR    INSERTION, ICD GENERATOR, SINGLE CHAMBER N/A 11/2/2018    Performed by Pernell Greer MD at [facility] CATH LAB    INSERTION, IOL PROSTHESIS Right 9/26/2018    Performed by Perla Cortés MD at [address]    INTRAOCULAR PROSTHESES INSERTION Right 9/26/2018    Procedure: INSERTION, IOL PROSTHESIS;  Surgeon: Perla Cortés MD;  Location: [facility] OR [address] [place] [address];  Service: Ophthalmology;  Laterality: Right;    PHACOEMULSIFICATION OF CATARACT Right 9/26/2018    Procedure: PHACOEMULSIFICATION, CATARACT;  Surgeon: Perla Cortés MD;  Location: [facility] OR [address] [place] [address];  Service: Ophthalmology;  Laterality: Right;    PHACOEMULSIFICATION, CATARACT Right 9/26/2018    Performed by Perla Cortés MD at [facility] OR [address] [place] [address]    Right foot surgery  10/2014    TOE AMPUTATION Right     first and second    TONSILLECTOMY      TRABECULECTOMY/G 6 LASER Left 2/15/2017    Performed by Perla Cortés MD at [facility] OR [address] [place] [address]       Subjective     Chief Complaint: "Mt leg is still numb."  Patient/Family stated goals: to get better and return home  Communicated with: nursing prior to session.  Pain/Comfort:  · Pain Rating 1: 0/10("I think the block is still working because I can't feel my foot.")    Patients cultural, spiritual, Hindu conflicts given the current situation:      Objective:     Patient found with: telemetry, peripheral IV    General Precautions: Standard, fall   Orthopedic Precautions:(LLE heel touch weight bearing)   Braces: N/A     Occupational Performance:    Bed Mobility:    · Patient completed Rolling/Turning to Left with  modified " "independence  · Patient completed Scooting/Bridging with modified independence  · Patient completed Supine to Sit with modified independence    Functional Mobility/Transfers:  · Patient completed Toilet Transfer Squat Pivot technique with supervision with  bedside commode  · Functional Mobility: NT as patient heel touch weight bearing    Activities of Daily Living:  · Feeding:  independence    · Upper Body Dressing: supervision    · Lower Body Dressing: moderate assistance    · Toileting: minimum assistance      Cognitive/Visual Perceptual:  Cognitive/Psychosocial Skills:     -       Oriented to: Person, Place and Situation   -       Follows Commands/attention:Follows one-step commands  -       Communication: clear/fluent  -       Memory: No Deficits noted  -       Safety awareness/insight to disability: impaired as patient bearing weight on foot despite education on weight bearing limitations   -       Mood/Affect/Coping skills/emotional control: Appropriate to situation    Physical Exam:  Balance:    -       sit balance good  Postural examination/scapula alignment:    -       Rounded shoulders  Skin integrity: Visible skin intact  Upper Extremity Range of Motion:     -       Right Upper Extremity: WFL  -       Left Upper Extremity: WFL  Upper Extremity Strength:    -       Right Upper Extremity: WFL  -       Left Upper Extremity: WFL    Patient left seated EOB with all lines intact, call button in reach, nurse notified and family present    [facility] 6 Click:  [facility] Total Score: 20    Treatment & Education:  Evaluation    Education:    Assessment:     Marvin Ray is a 58 y.o. male with a medical diagnosis of Cellulitis of left lower leg. At this time, patient is functioning at their prior level of function and does not require further acute OT services.     Clinical Decision Makin.  OT Low:  "Pt evaluation falls under low complexity for evaluation coding due to performance deficits noted in 1-3 areas as " "stated above and 0 co-morbities affecting current functional status. Data obtained from problem focused assessments. No modifications or assistance was required for completion of evaluation. Only brief occupational profile and history review completed."     Plan:     During this hospitalization, patient does not require further acute OT services.  Please re-consult if situation changes.    · Plan of Care Reviewed with: patient    This Plan of care has been discussed with the patient who was involved in its development and understands and is in agreement with the identified goals and treatment plan    GOALS:   Multidisciplinary Problems     Occupational Therapy Goals     Not on file          Multidisciplinary Problems (Resolved)        Problem: Occupational Therapy Goal    Goal Priority Disciplines Outcome Interventions   Occupational Therapy Goal   (Resolved)     OT, PT/OT Outcome(s) achieved                    Time Tracking:     OT Date of Treatment: 11/16/18  OT Start Time: 1610  OT Stop Time: 1623  OT Total Time (min): 13 min    Billable Minutes:Evaluation 13 minutes with PT    ROSALVA Lauren, MS  11/16/2018    "

## 2018-11-16 NOTE — ANESTHESIA PREPROCEDURE EVALUATION
11/16/2018  Marvin Ray is a 58 y.o., male.    Anesthesia Evaluation    I have reviewed the Patient Summary Reports.    I have reviewed the Nursing Notes.   I have reviewed the Medications.     Review of Systems  Anesthesia Hx:  No problems with previous Anesthesia Denies Hx of Anesthetic complications  Neg history of prior surgery. Denies Family Hx of Anesthesia complications.   Denies Personal Hx of Anesthesia complications.   Social:  Former Smoker, Alcohol Use    Hematology/Oncology:  Hematology Normal   Oncology Normal     EENT/Dental:EENT/Dental Normal   Cardiovascular:   Exercise tolerance: good Pacemaker Hypertension CAD   PVD hyperlipidemia CONCLUSIONS     1 - Moderately depressed left ventricular systolic function (EF 35-40%).     2 - Quantitatively measured LV function is 38%.     3 - Left ventricular diastolic dysfunction.     4 - Mild mitral regurgitation.     5 - Severe left atrial enlargement.     6 - Pulmonary hypertension. The estimated PA systolic pressure is 54 mmHg.     7 - Mild pulmonic regurgitation.     8 - Mild tricuspid regurgitation.     9 - Right ventricular enlargement with normal systolic function.     10 - Small pericardial effusion.     11 - Intermediate central venous pressure.       Peripheral vascular disease    Cardiac defibrillator in place   Pulmonary:  Pulmonary Normal    Renal/:   Chronic Renal Disease, CRI    Hepatic/GI:   Liver Disease,    Musculoskeletal:  Musculoskeletal Normal    Neurological:   Neuromuscular Disease,    Endocrine:   Diabetes, type 2 Hgb a1c -8.2%   Dermatological:  Skin Normal    Psych:  Psychiatric Normal           Physical Exam  General:  Well nourished    Airway/Jaw/Neck:  AIRWAY FINDINGS: Normal       Dental:  DENTAL FINDINGS: Normal   Chest/Lungs:  Chest/Lungs Clear    Heart/Vascular:  Heart Findings: Normal Heart murmur: negative        Mental Status:  Mental Status Findings:  Cooperative, Alert and Oriented         Anesthesia Plan  Type of Anesthesia, risks & benefits discussed:  Anesthesia Type:  general, regional  Patient's Preference:   Intra-op Monitoring Plan: standard ASA monitors  Intra-op Monitoring Plan Comments:   Post Op Pain Control Plan: per primary service following discharge from PACU, IV/PO Opioids PRN and multimodal analgesia  Post Op Pain Control Plan Comments:   Induction:   IV  Beta Blocker:  Patient is on a Beta-Blocker and has received one dose within the past 24 hours (No further documentation required).       Informed Consent: Patient understands risks and agrees with Anesthesia plan.  Questions answered. Anesthesia consent signed with patient.  ASA Score: 3     Day of Surgery Review of History & Physical:    H&P update referred to the surgeon.         Ready For Surgery From Anesthesia Perspective.

## 2018-11-16 NOTE — BRIEF OP NOTE
[facility]  Brief Operative Note    SUMMARY     Surgery Date: 11/16/2018     Surgeon(s) and Role:     * Mitch Chan MD - Primary    Assisting Surgeon: None    Pre-op Diagnosis:  Cellulitis of left lower extremity [L03.116]    Post-op Diagnosis:  Post-Op Diagnosis Codes:     * Cellulitis of left lower extremity [L03.116]    Procedure(s) (LRB):  INCISION AND DRAINAGE (Left)  AMPUTATION, TOES  2-5 (Left)    Anesthesia: Regional    Description of Procedure: L toe 2-5 with dry gangrenous changes    Description of the findings of the procedure: bleeding tissue to entire L foot after debridement, no deep infection or purulence    Estimated Blood Loss: <5cc    Specimens:   Specimen (12h ago, onward)    Start     Ordered    11/16/18 0840  Specimen to Pathology - Surgery  Once     Comments:  2nd - 5th toe left foot     Start Status   11/16/18 0840 Collected (11/16/18 0838)       11/16/18 0840

## 2018-11-16 NOTE — OP NOTE
DATE OF PROCEDURE:  11/16/2018.    SERVICE:  Vascular Surgery.    SURGEON:  Mitch Chan M.D.    ASSISTANT:  BHARATH Nelson.    PREOPERATIVE DIAGNOSES:  1.  Left lower extremity atherosclerosis with left foot wound with necrosis of   underlying bone.  2.  Hypertension.  3.  Hyperlipidemia.  4.  Diabetes mellitus.  5.  Coronary artery disease.  6.  CKD stage III.  7.  History of left lower extremity cellulitis.    POSTOPERATIVE DIAGNOSES:  1.  Left lower extremity atherosclerosis with left foot wound with necrosis of   underlying bone.  2.  Hypertension.  3.  Hyperlipidemia.  4.  Diabetes mellitus.  5.  Coronary artery disease.  6.  CKD stage III.  7.  History of left lower extremity cellulitis.    PROCEDURES PERFORMED:  1.  Left foot debridement, additional 20 square cm.  2.  Left foot debridement, additional 20 square cm.  3.  Left foot debridement, additional 20 square cm.  4.  Left foot washout.  5.  Left second toe amputation.  6.  Left third toe amputation.  7.  Left fourth toe amputation.  8.  Left fifth toe amputation and these were all down to the level of the   metatarsophalangeal joint, including the metatarsal head resection.    DESCRIPTION OF PROCEDURE:  The patient was seen preoperatively and deemed stable   for surgery.  His vital signs were stable and he is without complaints.  He was   brought to the Operating Room and placed supine on the table.  He was prepped   and draped in sterile fashion.  A timeout was performed.  Next, a scalpel was   used to debride overlying eschar to his left foot wound.  Bleeding tissue was   noted underneath the eschar.  Fibrinous tissue was noted to the dorsal aspect   and this was debrided to bleeding tissue as well.  The left second through fifth   toes were amputated with a scalpel to the level of the metatarsal head and this   was resected with a rongeur.  Bone cultures were sent as well as the toe   specimens for culture and pathology.  Cultures were also  obtained with swabs   from this area of toe amputation.  There was no evidence of deep infection.  The   entire left foot wound was debrided and bleeding tissue was noted.    Electrocautery was used to obtain hemostasis as well as manual pressure.  Next,   the wound was washed out with Pulsavac and dressed with a bacitracin-soaked   wet-to-dry dressing.  The patient was transferred to stretcher and subsequently   to Recovery Room in stable condition.    ESTIMATED BLOOD LOSS:  Less than 10 mL.    COMPLICATIONS:  None.    ANESTHESIA:  Regional.      CCL/IN  dd: 11/16/2018 10:17:22 (CST)  td: 11/16/2018 11:20:06 (CST)  Doc ID   #[id number]  Job ID #[id number]    CC:

## 2018-11-16 NOTE — PROGRESS NOTES
Patient going to scheduled procedure as ordered. Patient aaox4 without any discomfort. Tele monitor remains in place.

## 2018-11-16 NOTE — ANESTHESIA PROCEDURE NOTES
Left adductor canal block    Patient location during procedure: pre-op    Reason for block: primary anesthetic   Diagnosis: Left foot infection   Start time: 11/16/2018 7:48 AM  Timeout: 11/16/2018 7:35 AM   End time: 11/16/2018 7:51 AM  Surgery related to: Left foot I&D, 2-5 toe amputation  Staffing  Anesthesiologist: Supa Arellano MD  Performed: anesthesiologist   Preanesthetic Checklist  Completed: patient identified, site marked, surgical consent, pre-op evaluation, timeout performed, IV checked, risks and benefits discussed and monitors and equipment checked  Peripheral Block  Patient position: supine  Prep: ChloraPrep  Patient monitoring: heart rate, cardiac monitor, continuous pulse ox, continuous capnometry and frequent blood pressure checks  Block type: adductor canal  Laterality: left  Injection technique: single shot  Needle  Needle type: Stimuplex   Needle gauge: 21 G  Needle length: 4 in  Needle localization: anatomical landmarks and ultrasound guidance   -ultrasound image captured on disc.  Assessment  Injection assessment: negative aspiration, negative parasthesia and local visualized surrounding nerve  Paresthesia pain: none  Heart rate change: no  Slow fractionated injection: yes  Additional Notes  VSS.  [facility] RN monitoring vitals throughout procedure.  Patient tolerated procedure well.

## 2018-11-16 NOTE — PLAN OF CARE
Problem: Diabetes, Type 2 (Adult)  Intervention: Support/Optimize Psychosocial Response to Condition   11/16/18 1241   Coping/Psychosocial Interventions   Supportive Measures active listening utilized   Environmental Support calm environment promoted     Intervention: Optimize Glycemic Control   11/16/18 1241   Nutrition Interventions   Glycemic Management blood glucose monitoring       Goal: Signs and Symptoms of Listed Potential Problems Will be Absent, Minimized or Managed (Diabetes, Type 2)  Signs and symptoms of listed potential problems will be absent, minimized or managed by discharge/transition of care (reference Diabetes, Type 2 (Adult) CPG).  Outcome: Ongoing (interventions implemented as appropriate)   11/16/18 1241   Diabetes, Type 2   Problems Assessed (Type 2 Diabetes) all   Problems Present (Type 2 Diabetes) hyperglycemia

## 2018-11-16 NOTE — PROGRESS NOTES
Patient returned from procedure. Aaox4, denies pain or discomfort. o2 therapy at 4l to humidifier. IVF continues. Left foot post procedure dressing clean, dry and intact. Call light in reach. Will continue to monitor.

## 2018-11-16 NOTE — ASSESSMENT & PLAN NOTE
-L foot wound multifactorial likely due to DM, PVD and venous insufficiency s/p LLE angiography with SFA, AT and peroneal angioplasty s/p L foot washout/debridement 11/14/18 - would benefit from further debridement and toe amputations; discussed risks/benefits with pt and family who state understanding and desire to proceed with surgery, plan for 11/16/18.  -Ortho consult to eval for midfoot amputation options   -NPO   -Cont wound care consult recs  -Cont offloading  -Abx per primary team  -Strict glycemic control

## 2018-11-16 NOTE — PROGRESS NOTES
[facility]  Vascular Surgery  Progress Note    Patient Name: Marvin Ray  MRN: [id number]  Admission Date: 11/8/2018  Primary Care Provider: Rubén Davis MD    Subjective:     Interval History: Resting comfortably today.    Post-Op Info:  Procedure(s) (LRB):  INCISION AND DRAINAGE (Left)  AMPUTATION, TOES  2-5 (Left)   Day of Surgery       Medications:  Continuous Infusions:   sodium chloride 0.9% 50 mL/hr at 11/16/18 0551     Scheduled Meds:   amLODIPine  5 mg Oral Daily    aspirin  81 mg Oral Daily    brimonidine 0.1%  1 drop Both Eyes TID    carvedilol  6.25 mg Oral BID    cefTRIAXone (ROCEPHIN) IVPB  1 g Intravenous Q12H    clindamycin (CLEOCIN) IVPB  600 mg Intravenous Once    colchicine  0.3 mg Oral Daily    dorzolamide-timolol 2-0.5%  1 drop Both Eyes BID    enoxaparin  40 mg Subcutaneous Daily    ezetimibe  10 mg Oral Daily    furosemide  20 mg Oral Daily    insulin aspart U-100  5 Units Subcutaneous TIDWM    prednisoLONE acetate  1 drop Right Eye TID    senna-docusate 8.6-50 mg  1 tablet Oral BID    sodium bicarbonate  650 mg Oral BID     PRN Meds:acetaminophen, dextrose 50%, dextrose 50%, glucagon (human recombinant), glucose, glucose, influenza, insulin aspart U-100, oxyCODONE-acetaminophen, sodium chloride 0.9%, sodium chloride 0.9%     Objective:     Vital Signs (Most Recent):  Temp: 97.7 °F (36.5 °C) (11/16/18 0518)  Pulse: 81 (11/16/18 0518)  Resp: 18 (11/16/18 0518)  BP: 129/87 (11/16/18 0518)  SpO2: 96 % (11/16/18 0518) Vital Signs (24h Range):  Temp:  [97.4 °F (36.3 °C)-98.5 °F (36.9 °C)] 97.7 °F (36.5 °C)  Pulse:  [74-88] 81  Resp:  [16-19] 18  SpO2:  [94 %-97 %] 96 %  BP: (122-166)/(65-87) 129/87          Physical Exam   Constitutional: He is oriented to person, place, and time. He appears well-developed and well-nourished. No distress.   HENT:   Head: Normocephalic and atraumatic.   Eyes: Conjunctivae are normal.   Neck: Neck supple.   Cardiovascular:  Normal rate.   Pulses:       Femoral pulses are 2+ on the right side, and 2+ on the left side.       Dorsalis pedis pulses are Detected w/ doppler on the right side, and Detected w/ doppler on the left side.        Posterior tibial pulses are Detected w/ doppler on the right side, and Detected w/ doppler on the left side.   Pulmonary/Chest: Effort normal. No respiratory distress. He exhibits no tenderness.   Abdominal: Soft. He exhibits no distension and no mass. There is no tenderness. There is no rebound and no guarding.   Musculoskeletal: Normal range of motion. He exhibits no edema, tenderness or deformity.   Feet:   Right Foot:   Skin Integrity: Negative for ulcer, blister, skin breakdown, callus or dry skin.   Left Foot:   Skin Integrity: Positive for ulcer, blister, skin breakdown, callus and dry skin.   Neurological: He is alert and oriented to person, place, and time. No sensory deficit.   Skin: Skin is warm and dry. Capillary refill takes less than 2 seconds. No rash noted. No erythema. No pallor.   Psychiatric: He has a normal mood and affect.   Vitals reviewed.      Significant Labs:  All pertinent labs from the last 24 hours have been reviewed.    Significant Diagnostics:  I have reviewed all pertinent imaging results/findings within the past 24 hours.    Assessment/Plan:     Open wound of left foot    -L foot wound multifactorial likely due to DM, PVD and venous insufficiency s/p LLE angiography with SFA, AT and peroneal angioplasty s/p L foot washout/debridement 11/14/18 - would benefit from further debridement and toe amputations; discussed risks/benefits with pt and family who state understanding and desire to proceed with surgery, plan for 11/16/18.  -Ortho consult to eval for midfoot amputation options   -NPO   -Cont wound care consult recs  -Cont offloading  -Abx per primary team  -Strict glycemic control         Mitch Chan MD  Vascular Surgery  [facility]

## 2018-11-16 NOTE — PROGRESS NOTES
Patient awake, alert, oriented resting comfortably. Report given to oncoming nurse, JILLIAN Armstrong. 12hour chart check complete.

## 2018-11-16 NOTE — PLAN OF CARE
Problem: Occupational Therapy Goal  Goal: Occupational Therapy Goal  Outcome: Outcome(s) achieved Date Met: 11/16/18  Patient tolerated evaluation well, patient with no need for skilled OT services at this time. ROSALVA Lauren, MS

## 2018-11-16 NOTE — ANESTHESIA PROCEDURE NOTES
Left popliteal block    Patient location during procedure: pre-op    Reason for block: primary anesthetic   Diagnosis: Left foot infection   Start time: 11/16/2018 7:35 AM  Timeout: 11/16/2018 7:35 AM   End time: 11/16/2018 8:39 AM  Surgery related to: Left foot I&D, 2-5 toe amputation  Staffing  Anesthesiologist: Donny Keita MD  Performed: anesthesiologist   Preanesthetic Checklist  Completed: patient identified, site marked, surgical consent, pre-op evaluation, timeout performed, IV checked, risks and benefits discussed and monitors and equipment checked  Peripheral Block  Patient position: prone  Prep: ChloraPrep  Patient monitoring: heart rate, cardiac monitor, continuous pulse ox and frequent blood pressure checks  Block type: popliteal  Laterality: left  Injection technique: single shot  Needle  Needle type: Stimuplex   Needle gauge: 21 G  Needle length: 4 in  Needle localization: anatomical landmarks and ultrasound guidance   -ultrasound image captured on disc.  Assessment  Injection assessment: negative aspiration, negative parasthesia and local visualized surrounding nerve  Paresthesia pain: none  Heart rate change: no  Slow fractionated injection: yes  Additional Notes  VSS throughout, tolerated procedure well.  No complications

## 2018-11-16 NOTE — TRANSFER OF CARE
"Anesthesia Transfer of Care Note    Patient: Marvin Ray    Procedure(s) Performed: Procedure(s) (LRB):  INCISION AND DRAINAGE (Left)  AMPUTATION, TOES  2-5 (Left)    Patient location: PACU    Anesthesia Type: MAC    Transport from OR: Transported from OR on 6-10 L/min O2 by face mask with adequate spontaneous ventilation    Post pain: adequate analgesia    Post assessment: no apparent anesthetic complications    Post vital signs: stable    Level of consciousness: awake    Nausea/Vomiting: no nausea/vomiting    Complications: none    Transfer of care protocol was followed      Last vitals:   Visit Vitals  BP (!) 141/80 (BP Location: Right arm, Patient Position: Lying)   Pulse 82   Temp 35.7 °C (96.2 °F) (Axillary)   Resp 20   Ht 5' 10" (1.778 m)   Wt 100.1 kg (220 lb 10.9 oz)   SpO2 100%   BMI 31.66 kg/m²     "

## 2018-11-16 NOTE — PLAN OF CARE
Problem: Patient Care Overview  Goal: Plan of Care Review  Plan of care reviewed with patient at bedside. All questions answered. Fall precautions are in place. Bedside commode at bedside. Call light within reach. Bed in lowest position. Wound care done on left foot per order. Pain medication given prior to dressing change. Patient has been NPO since midnight in anticipation on left toe amputation in the AM.  PIV intact infusing NS at 50 ml per hour.  NSR. Will continue to monitor.

## 2018-11-16 NOTE — CONSULTS
Chief Complaint: LEFT foot wound    History of Present Illness:  Marvin Ray is a very pleasant 58 y.o. male with CKD, DM, PVD who presents with left foot wound.  He has been taken to surgery for I&D and returns to the OR today for I&D and possible toe amputation.  Consulted by vascular surgery for co-management and possibly amputation.  Review of Systems:    Noncontributory except for above      Past Medical History:   Diagnosis Date    Arthritis     Cellulitis     CKD (chronic kidney disease), stage III     Coronary artery disease     Diabetes mellitus     Diabetic retinopathy     Diabetic ulcer of left foot     Glaucoma     Gout     Hyperlipemia     Hypertension     ICD (implantable cardioverter-defibrillator) in place 11/02/2018    Left chest    Non-pressure chronic ulcer of other part of left foot with fat layer exposed 10/23/2018    PVD (peripheral vascular disease)     Type 2 diabetes mellitus with left diabetic foot ulcer 10/29/2018    Unsteady gait     uses a wheelchair       Past Surgical History:   Past Surgical History:   Procedure Laterality Date    AHMED GLAUCOMA IMPLANT Left 2011    DONE AT [facility]    BAERVELDT GLAUCOMA IMPLANT Left 2012    WITH CATARACT EXTRACTION//DONE AT [facility]    CARDIAC CATHETERIZATION Left 05/2016    CARDIAC DEFIBRILLATOR PLACEMENT Left 11/02/2018    CATARACT EXTRACTION W/  INTRAOCULAR LENS IMPLANT Left 2012    WITH BAERVEDT//DONE AT [facility]    CATARACT EXTRACTION W/  INTRAOCULAR LENS IMPLANT Right 09/26/2018    COMPLEX ()    CB DESTRUCTION WITH CYCLO G6 Left 02/15/2017        CYST REMOVAL      HEART CATH-LEFT N/A 5/6/2016    Performed by Mike Magana MD at [facility] CATH LAB    INCISION AND DRAINAGE Left 11/14/2018    Procedure: Incision and Drainage, left lower extremity debridement, washout;  Surgeon: Mitch Chan MD;  Location: [facility];  Service: Vascular;  Laterality: Left;    Incision and Drainage, left  lower extremity debridement, washout Left 11/14/2018    Performed by Mitch Chan MD at [facility] OR    INSERTION, ICD GENERATOR, SINGLE CHAMBER N/A 11/2/2018    Performed by Pernell Greer MD at [facility] CATH LAB    INSERTION, IOL PROSTHESIS Right 9/26/2018    Performed by Perla Cortés MD at [facility] OR [address] [place] [address]    INTRAOCULAR PROSTHESES INSERTION Right 9/26/2018    Procedure: INSERTION, IOL PROSTHESIS;  Surgeon: Perla Cortés MD;  Location: [facility] OR [address] [place] [address];  Service: Ophthalmology;  Laterality: Right;    PHACOEMULSIFICATION OF CATARACT Right 9/26/2018    Procedure: PHACOEMULSIFICATION, CATARACT;  Surgeon: Perla Cortés MD;  Location: [facility] OR [address] [place] [address];  Service: Ophthalmology;  Laterality: Right;    PHACOEMULSIFICATION, CATARACT Right 9/26/2018    Performed by Perla Cortés MD at [facility] OR [address] [place] [address]    Right foot surgery  10/2014    TOE AMPUTATION Right     first and second    TONSILLECTOMY      TRABECULECTOMY/G 6 LASER Left 2/15/2017    Performed by Perla Cortés MD at [facility] OR [address] [place] [address]       Social History:  negative tobacco abuse    Allergies:  Review of patient's allergies indicates:   Allergen Reactions    Statins-hmg-coa reductase inhibitors      Generalized Pain    Onglyza [saxagliptin]     Penicillins Rash       Medications:  Current Facility-Administered Medications   Medication Dose Route Frequency Provider Last Rate Last Dose    0.9%  NaCl infusion   Intravenous Continuous Ludivina Rodríguez MD 50 mL/hr at 11/16/18 0551      acetaminophen tablet 650 mg  650 mg Oral Q6H PRN Rip Greenfield Jr., MD        amLODIPine tablet 5 mg  5 mg Oral Daily Rip Greenfield Jr., MD   5 mg at 11/13/18 0859    aspirin chewable tablet 81 mg  81 mg Oral Daily Rip Greenfield Jr., MD   81 mg at 11/13/18 0859    bacitracin injection    PRN Mitch Chan MD   150,000 Units at 11/16/18 0813    brimonidine (ALPHAGAN P) ophthalmic solution 0.1%  1 drop Both Eyes TID Ludivina  JULEE Rodríguez MD   1 drop at 11/15/18 2053    carvedilol tablet 6.25 mg  6.25 mg Oral BID Rip Greenfield Jr., MD   6.25 mg at 11/15/18 2053    cefTRIAXone (ROCEPHIN) 1 g in dextrose 5 % 50 mL IVPB  1 g Intravenous Q12H Rip Greenfield Jr., MD   1 g at 11/16/18 0046    clindamycin 600 MG/50 ML D5W 600 mg/50 mL IVPB 600 mg  600 mg Intravenous Once Mitch Chan MD        colchicine split tablet 0.3 mg  0.3 mg Oral Daily Anton Nicholson MD   0.3 mg at 11/13/18 0902    dextrose 50% injection 12.5 g  12.5 g Intravenous PRN Rip Greenfield Jr., MD   12.5 g at 11/09/18 0846    dextrose 50% injection 25 g  25 g Intravenous PRN Rip Greenfield Jr., MD        dorzolamide-timolol 2-0.5% ophthalmic solution 1 drop  1 drop Both Eyes BID Ludivina Rodríguez MD   1 drop at 11/15/18 2053    enoxaparin injection 40 mg  40 mg Subcutaneous Daily Rip Greenfield Jr., MD   40 mg at 11/15/18 1648    ezetimibe tablet 10 mg  10 mg Oral Daily Rip Greenfield Jr., MD   10 mg at 11/13/18 0859    furosemide tablet 20 mg  20 mg Oral Daily Rip Greenfield Jr., MD   20 mg at 11/13/18 0859    glucagon (human recombinant) injection 1 mg  1 mg Intramuscular PRN Rip Greenfield Jr., MD        glucose chewable tablet 16 g  16 g Oral PRN Rip Greenfield Jr., MD        glucose chewable tablet 24 g  24 g Oral PRN Rip Greenfield Jr., MD        influenza (FLUZONE QUADRIVALENT) vaccine 0.5 mL  0.5 mL Intramuscular vaccine x 1 dose Rip Greenfield Jr., MD        insulin aspart U-100 pen 0-5 Units  0-5 Units Subcutaneous QID (AC + HS) PRN Rip Greenfield Jr., MD   1 Units at 11/14/18 2037    insulin aspart U-100 pen 5 Units  5 Units Subcutaneous TIDWM Tree Ortega MD   5 Units at 11/15/18 1649    oxyCODONE-acetaminophen 7.5-325 mg per tablet 1 tablet  1 tablet Oral Q4H PRN Ludivina Rodríguez MD   1 tablet at 11/16/18 0611    prednisoLONE acetate 1 % ophthalmic suspension 1 drop  1  drop Right Eye TID Ludivina Rodríguez MD   1 drop at 11/15/18 2053    senna-docusate 8.6-50 mg per tablet 1 tablet  1 tablet Oral BID Rip Greenfield Jr., MD   1 tablet at 11/15/18 2053    sodium bicarbonate tablet 650 mg  650 mg Oral BID Anton Nicholson MD   650 mg at 11/15/18 2052    sodium chloride 0.9% flush 3 mL  3 mL Intravenous PRN Zulema Hernández MD        sodium chloride 0.9% flush 3 mL  3 mL Intravenous PRN Supa Arellano MD        sodium chloride 0.9% flush 3 mL  3 mL Intravenous PRN Donny Keita MD         Facility-Administered Medications Ordered in Other Encounters   Medication Dose Route Frequency Provider Last Rate Last Dose    0.9%  NaCl infusion    Continuous PRN Whitley A Sticker, CRNA        fentaNYL injection    PRN Whitley A Sticker, CRNA   50 mcg at 11/16/18 0823    lidocaine (cardiac) injection    PRN Whitley A Sticker, CRNA   10 mg at 11/16/18 0853    midazolam injection   Intravenous PRN Whitley A Sticker, CRNA   1 mg at 11/16/18 0744    propofol (DIPRIVAN) 10 mg/mL infusion    PRN Whitley A Sticker, CRNA   10 mg at 11/16/18 0853       Physical Exam:   General:  Well developed and well nourished age appropriate male in no acute distress  Cardiovascular: regular rate and rhythm  Respiratory:  Non labored breathing no wheezing  Abdomen: soft, non-tender, non-distended  Musculoskeletal: multiple necrotic toes 2-5  Plantar medial and dorsal superficial wound with granulation tissue and no exposed bones/tendons  tissue changes of skin of LLE c/w PVD    Neuro: unable to examine as pt is sedated in OR    Imaging:  Imaging revealed: will order repeat foot xray    Diagnosis:  57 y/o man with multiple co morbidities including PVD, CKD and DM with left foot infection and necrotic toes.  See and examined in the OR.    Plan:   1. Will follow along with vascular surgery  2. Pt likely to need amputation, level to be determined with patient and vascular surgery discussion    -Sebastien Mohan  M.D.

## 2018-11-16 NOTE — ASSESSMENT & PLAN NOTE
Vascular surgery consulted  Pain control     S/P angiogram,on 11.13.18 ,1. US guided R CFA access  2. Aortogram   3. LLE angiogram  4. L SFA angioplasty with a 5 x 40 mm [place] balloon  5. L AT angioplasty with a 2 x 220 mm [place] balloon  6. L peroneal angioplasty with a 2 x 80 mm [place] balloon  7. Moderate sedation,  S/P wound debridemnemnt,intraoprative cultures are pending.  S/P toes 2-5 left amputation.

## 2018-11-16 NOTE — CONSULTS
S/P Incision and drainage left foot and amputation toes 2-5 11/16/18 per Dr. Chan  Consult from Dr. Chan to re-evaluate wound today and make recommendations for weekend wound care  Assessment:  Photodocumentation    Left dorsal foot- Wound on dorsal foot yellow; Red catheter threaded through dorsal foot.    Left lateral foot/amputation site of 2-5th toes- Small amount of bleeding from lateral incision. Base of wound red in color.     Left medial foot- Wound base yellow in color. Red catheter threaded through edge of wound.   Dressing removed from left foot saturated with blood tinged serous fluid. Sister reports bleeding seen when patient transferred to [facility]. Bleeding when dressing removed from amputation site. Pressure and Aquacel Extra used to promote clotting.   Treatment:  Filled amputation site with 3 Aquacel Extra dressings. Applied one tube of hydrogel to yellow areas and covered with gauze. Wrapped foot with 2 Kerlix roll and replaced Sundance boot. Encouraged patient to keep leg elevated and avoid putting pressure on foot.   Nursing to change dressing to left foot daily and prn during the weekend.

## 2018-11-16 NOTE — PROGRESS NOTES
[facility]  Progress Note    Patient Name: Marvin Ray  MRN: [id number]  Patient Class: IP- Inpatient   Admission Date: 11/8/2018  Length of Stay: 8 days  Attending Physician: Tree Ortega MD  Primary Care Provider: Rubén Davis MD        Subjective:     Principal Problem:Cellulitis of left lower leg    HPI:  59 yo male with CAD, HLP, hepatosplenomegaly/ascites, DM2 with CKD3 and retinopathy, PVD, gout, chronic combined CHF and chronic BLE wounds L>R presented from Dr. Chan office with concern for nonhealing wound to LLE. He was recently given bactrim and presented today with LAYNE, hyperkalemia and increased pain and edema to LLE. Pt has h/o Group G strep bacteremia (10/10). ICD implanted 11/2.  The patient's sister reports doing dressing changes of LE wounds 5 times a day. Noticeable weeping of fluid from the LE. Pt made NPO by vascular surgery and plan for angiogram and possible stent placement to LE to assist with better LE wound healing.     Hospital Course:  PT admitted with non-healing Left foot wounds. Plan for angiogram  By vascular surgery cancelled due to hyperkalemia/LAYNE.  Pt received kayexalate x 2 and  Potassium persistently high. Add calcium gluconate x one dose and IV lasix. Monitor K levels and plan for possible intervention in next few days if labs improve. Wound care consulted.     11/10- serum potassium elevated, check urine electrolytes; calcium IV given, pt frustrated about being in hospital and not able to ambulate on his own   11/11- persistent hyperkalemia in setting of improving LAYNE; pt received IV lasix, kayexalate x2, calcium gluconate 1500mg; insulin and dextrose today, lasix IV again and albuterol high dose neb x one  11/12- potassium 4.9; nephrology added sodium bicarb; vascular surgery planning intervention with improved labs   S/P angiogram,on 11.13.18 ,1. US guided R CFA access  2. Aortogram   3. LLE angiogram  4. L SFA  angioplasty with a 5 x 40 mm [place] balloon  5. L AT angioplasty with a 2 x 220 mm [place] balloon  6. L peroneal angioplasty with a 2 x 80 mm [place] balloon  7. Moderate sedation,  S/P wound debridemnemnt,intraoprative cultures are pending.  S/P toes 2-5 amputation.        Interval History: pt has no new complaints     Review of Systems   Constitutional: Negative for chills and fever.   HENT: Negative for congestion.    Eyes: Negative for visual disturbance.   Respiratory: Negative for shortness of breath.    Cardiovascular: Negative for chest pain.   Gastrointestinal: Negative for abdominal pain.   Endocrine: Negative for polyphagia.   Genitourinary: Negative for dysuria.   Musculoskeletal: Negative for back pain.   Skin: Positive for color change and wound. Negative for pallor and rash.   Allergic/Immunologic: Negative for immunocompromised state.   Neurological: Negative for weakness.   Hematological: Does not bruise/bleed easily.   Psychiatric/Behavioral: Negative for confusion.     Objective:     Vital Signs (Most Recent):  Temp: 97.5 °F (36.4 °C) (11/16/18 1055)  Pulse: 82 (11/16/18 1055)  Resp: 16 (11/16/18 1055)  BP: 131/80 (11/16/18 1055)  SpO2: 96 % (11/16/18 1055) Vital Signs (24h Range):  Temp:  [96.2 °F (35.7 °C)-97.7 °F (36.5 °C)] 97.5 °F (36.4 °C)  Pulse:  [74-88] 82  Resp:  [14-20] 16  SpO2:  [94 %-100 %] 96 %  BP: (122-166)/(65-87) 131/80     Weight: 100.1 kg (220 lb 10.9 oz)  Body mass index is 31.66 kg/m².    Intake/Output Summary (Last 24 hours) at 11/16/2018 1100  Last data filed at 11/16/2018 0930  Gross per 24 hour   Intake 2047.5 ml   Output 1000 ml   Net 1047.5 ml      Physical Exam   Constitutional: He is oriented to person, place, and time. He appears well-developed and well-nourished. No distress.   HENT:   Head: Normocephalic and atraumatic.   Eyes: Conjunctivae are normal.   Neck: Neck supple.   Cardiovascular: Normal rate.   Pulses:       Femoral pulses are 2+ on the right side, and  2+ on the left side.       Dorsalis pedis pulses are Detected w/ doppler on the right side, and Detected w/ doppler on the left side.        Posterior tibial pulses are Detected w/ doppler on the right side, and Detected w/ doppler on the left side.   Pulmonary/Chest: Effort normal. No respiratory distress. He exhibits no tenderness.   Abdominal: Soft. He exhibits no distension and no mass. There is no tenderness. There is no rebound and no guarding.   Musculoskeletal: Normal range of motion. He exhibits edema. He exhibits no tenderness or deformity.   Feet:   Right Foot:   Skin Integrity: Negative for ulcer, blister, skin breakdown, callus or dry skin.   Left Foot:   Skin Integrity: Positive for ulcer, blister, skin breakdown, callus and dry skin.   Neurological: He is alert and oriented to person, place, and time. No sensory deficit.   Skin: Skin is warm and dry. Capillary refill takes less than 2 seconds. No rash noted. No erythema. No pallor.   Psychiatric: He has a normal mood and affect.   Vitals reviewed.      Significant Labs:   BMP:   Recent Labs   Lab 11/16/18  0531   *   *   K 4.6      CO2 20*   BUN 27*   CREATININE 0.8   CALCIUM 8.5*       Significant Imaging: I have reviewed and interpreted all pertinent imaging results/findings within the past 24 hours.    Assessment/Plan:      Acute renal failure superimposed on stage 3 chronic kidney disease    Pt has reported baseline CKD3  Recently placed on bactrim in addition to acei and aldactone/lasix - hold offending agents  Gentle IVF  BMP in am  Renal dose antibiotics  Avoid nephrotoxins        Open wound of left foot    Wound care consulted  See above        Chronic combined systolic and diastolic heart failure    EF 20%,No acute issues  Hold aldactone and acei insetting of hyperkalemia and LAYNE   Resume BB  S/p ICD 11/2       Stage 3 chronic kidney disease    Will be monitored.       Hepatosplenomegaly    Recent paracentesis - negative  culture   Monitor CMP             PVD (peripheral vascular disease)        Vascular surgery consulted  Pain control     S/P angiogram,on 11.13.18 ,1. US guided R CFA access  2. Aortogram   3. LLE angiogram  4. L SFA angioplasty with a 5 x 40 mm [place] balloon  5. L AT angioplasty with a 2 x 220 mm [place] balloon  6. L peroneal angioplasty with a 2 x 80 mm [place] balloon  7. Moderate sedation,  S/P wound debridemnemnt,intraoprative cultures are pending.  S/P toes 2-5 left amputation.     CAD (coronary artery disease)    No acute issues  Resume aspirin and zetia  Statin intolerance         Essential hypertension      Controlled  Resume amlodipine and BB  Hold aldactone and acei as stated above      DM type 2 with diabetic peripheral neuropathy    Uncontrolled, A1c 8.3%  Basal -bolus insulin   Diabetic diet             VTE Risk Mitigation (From admission, onward)        Ordered     enoxaparin injection 40 mg  Daily      11/08/18 1501     IP VTE HIGH RISK PATIENT  Once      11/08/18 1501              Tree Ortega MD  Department of Hospital Medicine   [facility]

## 2018-11-16 NOTE — INTERVAL H&P NOTE
The patient has been examined and the H&P has been reviewed:    I concur with the findings and changes have been noted since the H&P was written: Pt is s/p LLE angioplasty and L foot debridement during hospitalization.  Presents to OR today for L toe amputations and further foot debridement due to extensive foot wound with identification of infection.    Anesthesia/Surgery risks, benefits and alternative options discussed and understood by patient/family.          Active Hospital Problems    Diagnosis  POA    *Cellulitis of left lower leg [L03.116]  Yes    Open wound of left foot [S91.302A]  Yes    Acute renal failure superimposed on stage 3 chronic kidney disease [N17.9, N18.3]  Yes    Metabolic acidosis [E87.2]  Yes    Chronic combined systolic and diastolic heart failure [I50.42]  Yes     Chronic    Hepatosplenomegaly [R16.2]  Yes    Stage 3 chronic kidney disease [N18.3]  Yes     Chronic    PVD (peripheral vascular disease) [I73.9]  Yes    CAD (coronary artery disease) [I25.10]  Yes     Chronic    Essential hypertension [I10]  Yes     Chronic    DM type 2 with diabetic peripheral neuropathy [E11.42]  Yes     Chronic      Resolved Hospital Problems    Diagnosis Date Resolved POA    Hyperkalemia [E87.5] 11/13/2018 Yes

## 2018-11-16 NOTE — SUBJECTIVE & OBJECTIVE
Medications:  Continuous Infusions:   sodium chloride 0.9% 50 mL/hr at 11/16/18 0551     Scheduled Meds:   amLODIPine  5 mg Oral Daily    aspirin  81 mg Oral Daily    brimonidine 0.1%  1 drop Both Eyes TID    carvedilol  6.25 mg Oral BID    cefTRIAXone (ROCEPHIN) IVPB  1 g Intravenous Q12H    clindamycin (CLEOCIN) IVPB  600 mg Intravenous Once    colchicine  0.3 mg Oral Daily    dorzolamide-timolol 2-0.5%  1 drop Both Eyes BID    enoxaparin  40 mg Subcutaneous Daily    ezetimibe  10 mg Oral Daily    furosemide  20 mg Oral Daily    insulin aspart U-100  5 Units Subcutaneous TIDWM    prednisoLONE acetate  1 drop Right Eye TID    senna-docusate 8.6-50 mg  1 tablet Oral BID    sodium bicarbonate  650 mg Oral BID     PRN Meds:acetaminophen, dextrose 50%, dextrose 50%, glucagon (human recombinant), glucose, glucose, influenza, insulin aspart U-100, oxyCODONE-acetaminophen, sodium chloride 0.9%, sodium chloride 0.9%     Objective:     Vital Signs (Most Recent):  Temp: 97.7 °F (36.5 °C) (11/16/18 0518)  Pulse: 81 (11/16/18 0518)  Resp: 18 (11/16/18 0518)  BP: 129/87 (11/16/18 0518)  SpO2: 96 % (11/16/18 0518) Vital Signs (24h Range):  Temp:  [97.4 °F (36.3 °C)-98.5 °F (36.9 °C)] 97.7 °F (36.5 °C)  Pulse:  [74-88] 81  Resp:  [16-19] 18  SpO2:  [94 %-97 %] 96 %  BP: (122-166)/(65-87) 129/87          Physical Exam   Constitutional: He is oriented to person, place, and time. He appears well-developed and well-nourished. No distress.   HENT:   Head: Normocephalic and atraumatic.   Eyes: Conjunctivae are normal.   Neck: Neck supple.   Cardiovascular: Normal rate.   Pulses:       Femoral pulses are 2+ on the right side, and 2+ on the left side.       Dorsalis pedis pulses are Detected w/ doppler on the right side, and Detected w/ doppler on the left side.        Posterior tibial pulses are Detected w/ doppler on the right side, and Detected w/ doppler on the left side.   Pulmonary/Chest: Effort normal. No  respiratory distress. He exhibits no tenderness.   Abdominal: Soft. He exhibits no distension and no mass. There is no tenderness. There is no rebound and no guarding.   Musculoskeletal: Normal range of motion. He exhibits no edema, tenderness or deformity.   Feet:   Right Foot:   Skin Integrity: Negative for ulcer, blister, skin breakdown, callus or dry skin.   Left Foot:   Skin Integrity: Positive for ulcer, blister, skin breakdown, callus and dry skin.   Neurological: He is alert and oriented to person, place, and time. No sensory deficit.   Skin: Skin is warm and dry. Capillary refill takes less than 2 seconds. No rash noted. No erythema. No pallor.   Psychiatric: He has a normal mood and affect.   Vitals reviewed.      Significant Labs:  All pertinent labs from the last 24 hours have been reviewed.    Significant Diagnostics:  I have reviewed all pertinent imaging results/findings within the past 24 hours.

## 2018-11-17 LAB
ALBUMIN SERPL BCP-MCNC: 2.1 G/DL
ALP SERPL-CCNC: 200 U/L
ALT SERPL W/O P-5'-P-CCNC: 15 U/L
ANION GAP SERPL CALC-SCNC: 6 MMOL/L
AST SERPL-CCNC: 14 U/L
BASOPHILS # BLD AUTO: 0.04 K/UL
BASOPHILS NFR BLD: 0.4 %
BILIRUB SERPL-MCNC: 0.6 MG/DL
BUN SERPL-MCNC: 37 MG/DL
CALCIUM SERPL-MCNC: 8.6 MG/DL
CHLORIDE SERPL-SCNC: 107 MMOL/L
CO2 SERPL-SCNC: 21 MMOL/L
CREAT SERPL-MCNC: 0.8 MG/DL
DIFFERENTIAL METHOD: ABNORMAL
EOSINOPHIL # BLD AUTO: 0.1 K/UL
EOSINOPHIL NFR BLD: 1.3 %
ERYTHROCYTE [DISTWIDTH] IN BLOOD BY AUTOMATED COUNT: 17.4 %
EST. GFR  (AFRICAN AMERICAN): >60 ML/MIN/1.73 M^2
EST. GFR  (NON AFRICAN AMERICAN): >60 ML/MIN/1.73 M^2
GLUCOSE SERPL-MCNC: 161 MG/DL
HCT VFR BLD AUTO: 29.4 %
HGB BLD-MCNC: 9.7 G/DL
LYMPHOCYTES # BLD AUTO: 0.7 K/UL
LYMPHOCYTES NFR BLD: 6.9 %
MCH RBC QN AUTO: 31.5 PG
MCHC RBC AUTO-ENTMCNC: 33 G/DL
MCV RBC AUTO: 96 FL
MONOCYTES # BLD AUTO: 0.9 K/UL
MONOCYTES NFR BLD: 8.6 %
NEUTROPHILS # BLD AUTO: 8.5 K/UL
NEUTROPHILS NFR BLD: 82.8 %
PLATELET # BLD AUTO: 345 K/UL
PMV BLD AUTO: 9.1 FL
POCT GLUCOSE: 139 MG/DL (ref 70–110)
POCT GLUCOSE: 176 MG/DL (ref 70–110)
POCT GLUCOSE: 189 MG/DL (ref 70–110)
POCT GLUCOSE: 206 MG/DL (ref 70–110)
POTASSIUM SERPL-SCNC: 4.8 MMOL/L
PROT SERPL-MCNC: 7.2 G/DL
RBC # BLD AUTO: 3.08 M/UL
SODIUM SERPL-SCNC: 134 MMOL/L
WBC # BLD AUTO: 10.25 K/UL

## 2018-11-17 PROCEDURE — 27000221 HC OXYGEN, UP TO 24 HOURS

## 2018-11-17 PROCEDURE — 97110 THERAPEUTIC EXERCISES: CPT

## 2018-11-17 PROCEDURE — 80053 COMPREHEN METABOLIC PANEL: CPT

## 2018-11-17 PROCEDURE — G8979 MOBILITY GOAL STATUS: HCPCS | Mod: CI

## 2018-11-17 PROCEDURE — 36415 COLL VENOUS BLD VENIPUNCTURE: CPT

## 2018-11-17 PROCEDURE — 85025 COMPLETE CBC W/AUTO DIFF WBC: CPT

## 2018-11-17 PROCEDURE — 25000003 PHARM REV CODE 250: Performed by: EMERGENCY MEDICINE

## 2018-11-17 PROCEDURE — 25000003 PHARM REV CODE 250: Performed by: INTERNAL MEDICINE

## 2018-11-17 PROCEDURE — 94761 N-INVAS EAR/PLS OXIMETRY MLT: CPT

## 2018-11-17 PROCEDURE — 63600175 PHARM REV CODE 636 W HCPCS: Performed by: EMERGENCY MEDICINE

## 2018-11-17 PROCEDURE — G8978 MOBILITY CURRENT STATUS: HCPCS | Mod: CK

## 2018-11-17 PROCEDURE — 21400001 HC TELEMETRY ROOM

## 2018-11-17 RX ORDER — AMLODIPINE BESYLATE 5 MG/1
10 TABLET ORAL DAILY
Status: DISCONTINUED | OUTPATIENT
Start: 2018-11-18 | End: 2018-11-21 | Stop reason: HOSPADM

## 2018-11-17 RX ADMIN — FUROSEMIDE 20 MG: 20 TABLET ORAL at 08:11

## 2018-11-17 RX ADMIN — PREDNISOLONE ACETATE 1 DROP: 10 SUSPENSION OPHTHALMIC at 03:11

## 2018-11-17 RX ADMIN — COLCHICINE 0.3 MG: 0.6 TABLET, FILM COATED ORAL at 08:11

## 2018-11-17 RX ADMIN — STANDARDIZED SENNA CONCENTRATE AND DOCUSATE SODIUM 1 TABLET: 8.6; 5 TABLET, FILM COATED ORAL at 09:11

## 2018-11-17 RX ADMIN — CEFTRIAXONE 1 G: 1 INJECTION, SOLUTION INTRAVENOUS at 12:11

## 2018-11-17 RX ADMIN — STANDARDIZED SENNA CONCENTRATE AND DOCUSATE SODIUM 1 TABLET: 8.6; 5 TABLET, FILM COATED ORAL at 08:11

## 2018-11-17 RX ADMIN — CARVEDILOL 6.25 MG: 6.25 TABLET, FILM COATED ORAL at 09:11

## 2018-11-17 RX ADMIN — INSULIN ASPART 2 UNITS: 100 INJECTION, SOLUTION INTRAVENOUS; SUBCUTANEOUS at 11:11

## 2018-11-17 RX ADMIN — BRIMONIDINE TARTRATE 1 DROP: 1 SOLUTION/ DROPS OPHTHALMIC at 08:11

## 2018-11-17 RX ADMIN — ENOXAPARIN SODIUM 40 MG: 100 INJECTION SUBCUTANEOUS at 04:11

## 2018-11-17 RX ADMIN — ASPIRIN 81 MG 81 MG: 81 TABLET ORAL at 08:11

## 2018-11-17 RX ADMIN — SODIUM BICARBONATE 650 MG: 325 TABLET ORAL at 08:11

## 2018-11-17 RX ADMIN — PREDNISOLONE ACETATE 1 DROP: 10 SUSPENSION OPHTHALMIC at 08:11

## 2018-11-17 RX ADMIN — EZETIMIBE 10 MG: 10 TABLET ORAL at 08:11

## 2018-11-17 RX ADMIN — INSULIN ASPART 5 UNITS: 100 INJECTION, SOLUTION INTRAVENOUS; SUBCUTANEOUS at 08:11

## 2018-11-17 RX ADMIN — INSULIN ASPART 5 UNITS: 100 INJECTION, SOLUTION INTRAVENOUS; SUBCUTANEOUS at 04:11

## 2018-11-17 RX ADMIN — BRIMONIDINE TARTRATE 1 DROP: 1 SOLUTION/ DROPS OPHTHALMIC at 03:11

## 2018-11-17 RX ADMIN — DORZOLAMIDE HYDROCHLORIDE AND TIMOLOL MALEATE 1 DROP: 20; 5 SOLUTION/ DROPS OPHTHALMIC at 08:11

## 2018-11-17 RX ADMIN — BRIMONIDINE TARTRATE 1 DROP: 1 SOLUTION/ DROPS OPHTHALMIC at 09:11

## 2018-11-17 RX ADMIN — DORZOLAMIDE HYDROCHLORIDE AND TIMOLOL MALEATE 1 DROP: 20; 5 SOLUTION/ DROPS OPHTHALMIC at 09:11

## 2018-11-17 RX ADMIN — CEFTRIAXONE 1 G: 1 INJECTION, SOLUTION INTRAVENOUS at 03:11

## 2018-11-17 RX ADMIN — PREDNISOLONE ACETATE 1 DROP: 10 SUSPENSION OPHTHALMIC at 09:11

## 2018-11-17 RX ADMIN — AMLODIPINE BESYLATE 5 MG: 5 TABLET ORAL at 08:11

## 2018-11-17 RX ADMIN — CARVEDILOL 6.25 MG: 6.25 TABLET, FILM COATED ORAL at 08:11

## 2018-11-17 RX ADMIN — INSULIN ASPART 5 UNITS: 100 INJECTION, SOLUTION INTRAVENOUS; SUBCUTANEOUS at 11:11

## 2018-11-17 NOTE — ASSESSMENT & PLAN NOTE
Vascular surgery consulted  Pain control     S/P angiogram,on 11.13.18 ,1. US guided R CFA access  2. Aortogram   3. LLE angiogram  4. L SFA angioplasty with a 5 x 40 mm [place] balloon  5. L AT angioplasty with a 2 x 220 mm [place] balloon  6. L peroneal angioplasty with a 2 x 80 mm [place] balloon  7. Moderate sedation,  S/P wound debridemnemnt,intraoprative cultures are pending.  S/P toes 2-5 left amputation.growing gram negative and positive rods,cocci.

## 2018-11-17 NOTE — PT/OT/SLP PROGRESS
Physical Therapy Treatment    Patient Name:  Marvin Ray   MRN:  [id number]    Recommendations:     Discharge Recommendations:  home with home health   Discharge Equipment Recommendations: none   Barriers to discharge: None    Assessment:     Mavrin Ray is a 58 y.o. male admitted with a medical diagnosis of Cellulitis of left lower leg.  He presents with the following impairments/functional limitations:  weakness, impaired endurance, impaired sensation, impaired functional mobilty, impaired balance, edema, impaired skin, orthopedic precautions .    Rehab Prognosis:  Fair+; patient would benefit from acute skilled PT services to address these deficits and reach maximum level of function.      Recent Surgery: Procedure(s) (LRB):  INCISION AND DRAINAGE (Left)  AMPUTATION, TOES  2-5 (Left) 1 Day Post-Op    Plan:     During this hospitalization, patient to be seen 3 x/week to address the above listed problems via therapeutic activities, therapeutic exercises  · Plan of Care Expires:  11/30/18   Plan of Care Reviewed with: patient    Subjective     Communicated with nurse prior to session.  Patient found laying in bed with family present upon PT entry to room, agreeable to treatment.      Chief Complaint: none  Patient comments/goals: I have been doing some bed exercises  Pain/Comfort:  · Pain Rating 1: 0/10    Patients cultural, spiritual, [employer] conflicts given the current situation: none    Objective:     Patient found with: oxygen, pressure relief boots, peripheral IV, telemetry     General Precautions: Standard, diabetic, fall, respiratory   Orthopedic Precautions:(Pt reported that Dr. Chan is allowing him to L heel touch with Darco shoe for transfers only, no ambulation at this time.  Pt has been transferring from bed<>BSC with nursing staff and sister.)   Braces: (L Darco shoe and R tennis shoe)     Functional Mobility:  · Bed Mobility:     · Rolling Left:  modified independence  · Rolling  Right: modified independence  · Scooting: modified independence      AM-PAC 6 CLICK MOBILITY  Turning over in bed (including adjusting bedclothes, sheets and blankets)?: 4  Sitting down on and standing up from a chair with arms (e.g., wheelchair, bedside commode, etc.): 4  Moving from lying on back to sitting on the side of the bed?: 4  Moving to and from a bed to a chair (including a wheelchair)?: 3  Need to walk in hospital room?: 1  Climbing 3-5 steps with a railing?: 1  Basic Mobility Total Score: 17       Therapeutic Activities and Exercises:  Pt completed supine there-ex x 20: SLR, quad sets, hip abd, HS, resisted supine hor abd, resisted supine BL ER.  Pt able to demonstrate understanding of transfers with minimal weight bearing to L LE.     Patient left supine with all lines intact, call button in reach and nurse notified..    GOALS:   Multidisciplinary Problems     Physical Therapy Goals        Problem: Physical Therapy Goal    Goal Priority Disciplines Outcome Goal Variances Interventions   Physical Therapy Goal     PT, PT/OT      Description:  Goals to be met by: 18     Patient will increase functional independence with mobility by performin. Supine to sit with Modified [place]  2. Rolling to Left and Right with Modified [place]  3. Bed to chair transfer with Modified [place] using L Darco shoe and heel touch WB  4. Wheelchair propulsion x250 feet with Modified [place] using bilateral upper extremity  5. Upper/Lower extremity exercise program (seated/supine) 3 sets x10 reps per handout, with independence                      Time Tracking:     PT Received On: 18  PT Start Time: 1438     PT Stop Time: 1505  PT Total Time (min): 27 min     Billable Minutes: Therapeutic Exercise 27    Treatment Type: Treatment  PT/PTA: PTA     PTA Visit Number: 1     Dov Roberson, PTA  2018

## 2018-11-17 NOTE — PROGRESS NOTES
[facility]  Progress Note    Patient Name: Marvin Ray  MRN: [id number]  Patient Class: IP- Inpatient   Admission Date: 11/8/2018  Length of Stay: 9 days  Attending Physician: Tree Ortega MD  Primary Care Provider: Rubén Davis MD        Subjective:     Principal Problem:Cellulitis of left lower leg    HPI:  57 yo male with CAD, HLP, hepatosplenomegaly/ascites, DM2 with CKD3 and retinopathy, PVD, gout, chronic combined CHF and chronic BLE wounds L>R presented from Dr. Chan office with concern for nonhealing wound to LLE. He was recently given bactrim and presented today with LAYNE, hyperkalemia and increased pain and edema to LLE. Pt has h/o Group G strep bacteremia (10/10). ICD implanted 11/2.  The patient's sister reports doing dressing changes of LE wounds 5 times a day. Noticeable weeping of fluid from the LE. Pt made NPO by vascular surgery and plan for angiogram and possible stent placement to LE to assist with better LE wound healing.     Hospital Course:  PT admitted with non-healing Left foot wounds. Plan for angiogram  By vascular surgery cancelled due to hyperkalemia/LAYNE.  Pt received kayexalate x 2 and  Potassium persistently high. Add calcium gluconate x one dose and IV lasix. Monitor K levels and plan for possible intervention in next few days if labs improve. Wound care consulted.     11/10- serum potassium elevated, check urine electrolytes; calcium IV given, pt frustrated about being in hospital and not able to ambulate on his own   11/11- persistent hyperkalemia in setting of improving LAYNE; pt received IV lasix, kayexalate x2, calcium gluconate 1500mg; insulin and dextrose today, lasix IV again and albuterol high dose neb x one  11/12- potassium 4.9; nephrology added sodium bicarb; vascular surgery planning intervention with improved labs   S/P angiogram,on 11.13.18 ,1. US guided R CFA access  2. Aortogram   3. LLE angiogram  4. L SFA  angioplasty with a 5 x 40 mm [place] balloon  5. L AT angioplasty with a 2 x 220 mm [place] balloon  6. L peroneal angioplasty with a 2 x 80 mm [place] balloon  7. Moderate sedation,  S/P wound debridemnemnt,intraoprative cultures are pending.growing gram negative and positive rods,cocci.  S/P toes 2-5 amputation.        Interval History: pt has no new complaints     Review of Systems   Constitutional: Negative for chills and fever.   HENT: Negative for congestion.    Eyes: Negative for visual disturbance.   Respiratory: Negative for shortness of breath.    Cardiovascular: Negative for chest pain.   Gastrointestinal: Negative for abdominal pain.   Endocrine: Negative for polyphagia.   Genitourinary: Negative for dysuria.   Musculoskeletal: Negative for back pain.   Skin: Positive for color change and wound. Negative for pallor and rash.   Allergic/Immunologic: Negative for immunocompromised state.   Neurological: Negative for weakness.   Hematological: Does not bruise/bleed easily.   Psychiatric/Behavioral: Negative for confusion.     Objective:     Vital Signs (Most Recent):  Temp: 98.6 °F (37 °C) (11/17/18 0707)  Pulse: 80 (11/17/18 0707)  Resp: 18 (11/17/18 0707)  BP: (!) 142/82 (11/17/18 0707)  SpO2: 97 % (11/17/18 0707) Vital Signs (24h Range):  Temp:  [96.2 °F (35.7 °C)-98.6 °F (37 °C)] 98.6 °F (37 °C)  Pulse:  [] 80  Resp:  [14-20] 18  SpO2:  [92 %-100 %] 97 %  BP: (127-153)/(79-85) 142/82     Weight: 100.1 kg (220 lb 10.9 oz)  Body mass index is 31.66 kg/m².    Intake/Output Summary (Last 24 hours) at 11/17/2018 0851  Last data filed at 11/17/2018 0608  Gross per 24 hour   Intake 1606.67 ml   Output 500 ml   Net 1106.67 ml      Physical Exam   Constitutional: He is oriented to person, place, and time. He appears well-developed and well-nourished. No distress.   HENT:   Head: Normocephalic and atraumatic.   Eyes: Conjunctivae are normal.   Neck: Neck supple.   Cardiovascular: Normal rate.   Pulses:        Femoral pulses are 2+ on the right side, and 2+ on the left side.       Dorsalis pedis pulses are Detected w/ doppler on the right side, and Detected w/ doppler on the left side.        Posterior tibial pulses are Detected w/ doppler on the right side, and Detected w/ doppler on the left side.   Pulmonary/Chest: Effort normal. No respiratory distress. He exhibits no tenderness.   Abdominal: Soft. He exhibits no distension and no mass. There is no tenderness. There is no rebound and no guarding.   Musculoskeletal: Normal range of motion. He exhibits edema. He exhibits no tenderness or deformity.   Feet:   Right Foot:   Skin Integrity: Negative for ulcer, blister, skin breakdown, callus or dry skin.   Left Foot:   Skin Integrity: Positive for ulcer, blister, skin breakdown, callus and dry skin.   Neurological: He is alert and oriented to person, place, and time. No sensory deficit.   Skin: Skin is warm and dry. Capillary refill takes less than 2 seconds. No rash noted. No erythema. No pallor.   Psychiatric: He has a normal mood and affect.   Vitals reviewed.      Significant Labs:   BMP:   Recent Labs   Lab 11/17/18  0438   *   *   K 4.8      CO2 21*   BUN 37*   CREATININE 0.8   CALCIUM 8.6*       Significant Imaging: I have reviewed and interpreted all pertinent imaging results/findings within the past 24 hours.    Assessment/Plan:      Acute renal failure superimposed on stage 3 chronic kidney disease    Pt has reported baseline CKD3  Recently placed on bactrim in addition to acei and aldactone/lasix - hold offending agents  Gentle IVF  BMP in am  Renal dose antibiotics  Avoid nephrotoxins        Open wound of left foot    Wound care consulted  See above        Chronic combined systolic and diastolic heart failure    EF 20%,No acute issues  Hold aldactone and acei insetting of hyperkalemia and LAYNE   Resume BB  S/p ICD 11/2       Stage 3 chronic kidney disease    Will be monitored.        Hepatosplenomegaly    Recent paracentesis - negative culture   Monitor CMP             PVD (peripheral vascular disease)        Vascular surgery consulted  Pain control     S/P angiogram,on 11.13.18 ,1. US guided R CFA access  2. Aortogram   3. LLE angiogram  4. L SFA angioplasty with a 5 x 40 mm [place] balloon  5. L AT angioplasty with a 2 x 220 mm [place] balloon  6. L peroneal angioplasty with a 2 x 80 mm [place] balloon  7. Moderate sedation,  S/P wound debridemnemnt,intraoprative cultures are pending.  S/P toes 2-5 left amputation.growing gram negative and positive rods,cocci.     CAD (coronary artery disease)    No acute issues  Resume aspirin and zetia  Statin intolerance         Essential hypertension      Controlled  Resume amlodipine and BB  Hold aldactone and acei as stated above      DM type 2 with diabetic peripheral neuropathy    Uncontrolled, A1c 8.3%  Basal -bolus insulin   Diabetic diet             VTE Risk Mitigation (From admission, onward)        Ordered     enoxaparin injection 40 mg  Daily      11/08/18 1501     IP VTE HIGH RISK PATIENT  Once      11/08/18 1501              Tree Ortega MD  Department of Hospital Medicine   [facility]

## 2018-11-17 NOTE — SUBJECTIVE & OBJECTIVE
Interval History: pt has no new complaints     Review of Systems   Constitutional: Negative for chills and fever.   HENT: Negative for congestion.    Eyes: Negative for visual disturbance.   Respiratory: Negative for shortness of breath.    Cardiovascular: Negative for chest pain.   Gastrointestinal: Negative for abdominal pain.   Endocrine: Negative for polyphagia.   Genitourinary: Negative for dysuria.   Musculoskeletal: Negative for back pain.   Skin: Positive for color change and wound. Negative for pallor and rash.   Allergic/Immunologic: Negative for immunocompromised state.   Neurological: Negative for weakness.   Hematological: Does not bruise/bleed easily.   Psychiatric/Behavioral: Negative for confusion.     Objective:     Vital Signs (Most Recent):  Temp: 98.6 °F (37 °C) (11/17/18 0707)  Pulse: 80 (11/17/18 0707)  Resp: 18 (11/17/18 0707)  BP: (!) 142/82 (11/17/18 0707)  SpO2: 97 % (11/17/18 0707) Vital Signs (24h Range):  Temp:  [96.2 °F (35.7 °C)-98.6 °F (37 °C)] 98.6 °F (37 °C)  Pulse:  [] 80  Resp:  [14-20] 18  SpO2:  [92 %-100 %] 97 %  BP: (127-153)/(79-85) 142/82     Weight: 100.1 kg (220 lb 10.9 oz)  Body mass index is 31.66 kg/m².    Intake/Output Summary (Last 24 hours) at 11/17/2018 0851  Last data filed at 11/17/2018 0608  Gross per 24 hour   Intake 1606.67 ml   Output 500 ml   Net 1106.67 ml      Physical Exam   Constitutional: He is oriented to person, place, and time. He appears well-developed and well-nourished. No distress.   HENT:   Head: Normocephalic and atraumatic.   Eyes: Conjunctivae are normal.   Neck: Neck supple.   Cardiovascular: Normal rate.   Pulses:       Femoral pulses are 2+ on the right side, and 2+ on the left side.       Dorsalis pedis pulses are Detected w/ doppler on the right side, and Detected w/ doppler on the left side.        Posterior tibial pulses are Detected w/ doppler on the right side, and Detected w/ doppler on the left side.   Pulmonary/Chest: Effort  normal. No respiratory distress. He exhibits no tenderness.   Abdominal: Soft. He exhibits no distension and no mass. There is no tenderness. There is no rebound and no guarding.   Musculoskeletal: Normal range of motion. He exhibits edema. He exhibits no tenderness or deformity.   Feet:   Right Foot:   Skin Integrity: Negative for ulcer, blister, skin breakdown, callus or dry skin.   Left Foot:   Skin Integrity: Positive for ulcer, blister, skin breakdown, callus and dry skin.   Neurological: He is alert and oriented to person, place, and time. No sensory deficit.   Skin: Skin is warm and dry. Capillary refill takes less than 2 seconds. No rash noted. No erythema. No pallor.   Psychiatric: He has a normal mood and affect.   Vitals reviewed.      Significant Labs:   BMP:   Recent Labs   Lab 11/17/18  0438   *   *   K 4.8      CO2 21*   BUN 37*   CREATININE 0.8   CALCIUM 8.6*       Significant Imaging: I have reviewed and interpreted all pertinent imaging results/findings within the past 24 hours.

## 2018-11-17 NOTE — PT/OT/SLP EVAL
Physical Therapy Evaluation    Patient Name:  Marvin Ray   MRN:  [id number]    Recommendations:     Discharge Recommendations:  home with home health(with family assistance)   Discharge Equipment Recommendations: none   Barriers to discharge: None    Assessment:     Marvin Ray is a 58 y.o. male admitted with a medical diagnosis of Cellulitis of left lower leg.  He presents with the following impairments/functional limitations:  weakness, impaired endurance, impaired sensation, impaired functional mobilty, impaired balance, decreased lower extremity function, decreased safety awareness, impaired skin, edema, orthopedic precautions.    Rehab Prognosis:  Fair+; patient would benefit from acute skilled PT services to address these deficits and reach maximum level of function.      Recent Surgery: Procedure(s) (LRB):  INCISION AND DRAINAGE (Left)  AMPUTATION, TOES  2-5 (Left) 1 Day Post-Op    Plan:     During this hospitalization, patient to be seen 3 x/week to address the above listed problems via therapeutic activities, therapeutic exercises, wheelchair management/training  · Plan of Care Expires:  11/30/18   Plan of Care Reviewed with: patient    Subjective     Patient found in bed upon PT entry to room, agreeable to evaluation.      Chief Complaint: weakness and SOB since sx  Patient comments/goals: to go home  Pain/Comfort:  · Pain Rating 1: 0/10(2* pt's report of block during sx)    Living Environment:  Pt lives with sister in a SS house with ramp at entry.  Prior to admission, patients level of function was independent and driving prior to L foot wound.  Pt has been using his w/c recently.  Patient has the following equipment: wheelchair, walker, rolling, bedside commode, bath bench, crutches, cane, straight, cane, quad.  Upon discharge, patient will have assistance from sister.    Objective:     Patient found with: oxygen 3L, peripheral IV, telemetry, pressure relief boot for L foot     General  Precautions: Standard, fall, diabetic, respiratory   Orthopedic Precautions:(Pt reported that Dr. Chan is allowing him to L heel touch with Darco shoe for transfers only, no ambulation at this time.  Pt [place] [place] and sister.)   Braces: (L Darco shoe and R tennis shoe)     Exams:  · Cognitive Exam:  Patient was able to follow multiple commands.   · Gross Motor Coordination:  WFL  · Postural Exam:  Patient presented with the following abnormalities:    · -       Rounded shoulders  · Sensation:  Pt reported that L foot is numb 2* block from sx.   · Skin Integrity/Edema:      · -       Skin integrity: L foot wound dressings intact with minimal bloody drainage  · RLE ROM: WFL  · RLE Strength: WFL  · LLE ROM: WFL  · LLE Strength: WFL    Functional Mobility:  · Bed Mobility:     · Scooting: modified independence  · Supine to Sit: modified independence with HOB elevated   · Transfers:     · Toilet Transfer (bed<>bedside commode): stand by assistance with  no AD  using  Squat Pivot with L Darco shoe and R tennis shoe; Pt was unable to maintain L heel touch WB, appeared to have full weight on LLE.    · Balance: Pt with fair+ balance.    AM-PAC 6 CLICK MOBILITY  Total Score:17       Therapeutic Activities and Exercises:  Pt educated on the elevation of LLE and to avoid sitting for an extended period of time with LE in dependent position.  Pt verbalized good understanding.     Patient left seated EOB per pt's request with all lines intact, call button in reach and family present.    GOALS:   Multidisciplinary Problems     Physical Therapy Goals        Problem: Physical Therapy Goal    Goal Priority Disciplines Outcome Goal Variances Interventions   Physical Therapy Goal     PT, PT/OT      Description:  Goals to be met by: 18     Patient will increase functional independence with mobility by performin. Supine to sit with Modified [place]  2. Rolling to Left and  Right with Modified [place]  3. Bed to chair transfer with Modified [place] using L Darco shoe and heel touch WB  4. Wheelchair propulsion x250 feet with Modified [place] using bilateral upper extremity  5. Upper/Lower extremity exercise program (seated/supine) 3 sets x10 reps per handout, with independence                      History:     Past Medical History:   Diagnosis Date    Arthritis     Cellulitis     CKD (chronic kidney disease), stage III     Coronary artery disease     Diabetes mellitus     Diabetic retinopathy     Diabetic ulcer of left foot     Glaucoma     Gout     Hyperlipemia     Hypertension     ICD (implantable cardioverter-defibrillator) in place 11/02/2018    Left chest    Non-pressure chronic ulcer of other part of left foot with fat layer exposed 10/23/2018    PVD (peripheral vascular disease)     Type 2 diabetes mellitus with left diabetic foot ulcer 10/29/2018    Unsteady gait     uses a wheelchair       Past Surgical History:   Procedure Laterality Date    AHMED GLAUCOMA IMPLANT Left 2011    DONE AT [facility]    BAERVELDT GLAUCOMA IMPLANT Left 2012    WITH CATARACT EXTRACTION//DONE AT [facility]    CARDIAC CATHETERIZATION Left 05/2016    CARDIAC DEFIBRILLATOR PLACEMENT Left 11/02/2018    CATARACT EXTRACTION W/  INTRAOCULAR LENS IMPLANT Left 2012    WITH BAERVEDT//DONE AT [facility]    CATARACT EXTRACTION W/  INTRAOCULAR LENS IMPLANT Right 09/26/2018    COMPLEX ()    CB DESTRUCTION WITH CYCLO G6 Left 02/15/2017        CYST REMOVAL      HEART CATH-LEFT N/A 5/6/2016    Per[facility] at [facility] CATH LAB    INCISION AND DRAINAGE Left 11/14/2018    Procedure: Incision and Drainage, left lower extremity debridement, washout;  Surgeon: Mitch Chan MD;  Location: [facility] OR;  Service: Vascular;  Laterality: Left;    Incision and Drainage, left lower extremity debridement, washout Left 11/14/2018    Performed by Mitch  HERSON Chan MD at [facility] OR    INSERTION, ICD GENERATOR, SINGLE CHAMBER N/A 11/2/2018    Performed by Pernell Greer MD at [facility] CATH LAB    INSERTION, IOL PROSTHESIS Right 9/26/2018    Performed by Perla Cortés MD at [facility] OR [address] [place] [address]    INTRAOCULAR PROSTHESES INSERTION Right 9/26/2018    Procedure: INSERTION, IOL PROSTHESIS;  Surgeon: Perla Cortés MD;  Location: [facility] OR [address] [place] [address];  Service: Ophthalmology;  Laterality: Right;    PHACOEMULSIFICATION OF CATARACT Right 9/26/2018    Procedure: PHACOEMULSIFICATION, CATARACT;  Surgeon: Perla Cortés MD;  Location: [facility] OR [address] [place] [address];  Service: Ophthalmology;  Laterality: Right;    PHACOEMULSIFICATION, CATARACT Right 9/26/2018    Performed by Perla Cortés MD at [facility] OR [address] [place] [address]    Right foot surgery  10/2014    TOE AMPUTATION Right     first and second    TONSILLECTOMY      TRABECULECTOMY/G 6 LASER Left 2/15/2017    Performed by Perla Cortés MD at [facility] OR [address] [place] [address]       Clinical Decision Making:     History  Co-morbidities and personal factors that may impact the plan of care Examination  Body Structures and Functions, activity limitations and participation restrictions that may impact the plan of care Clinical Presentation   Decision Making/ Complexity Score   Co-morbidities:   [x] Time since onset of injury / illness / exacerbation  [x] Status of current condition  [x]Patient's cognitive status and safety concerns    [x] Multiple Medical Problems (see med hx)  Personal Factors:   [] Patient's age  [] Prior Level of function   [] Patient's home situation (environment and family support)  [] Patient's level of motivation  [] Expected progression of patient      HISTORY:(criteria)    [] 26771 - no personal factors/history    [] 35703 - has 1-2 personal factor/comorbidity     [x] 81177 - has >3 personal factor/comorbidity     Body Regions:  [x] Objective examination findings  [] Head     []  Neck  [] Trunk   [] Upper Extremity  [x]  Lower Extremity    Body Systems:  [x] For communication ability, affect, cognition, language, and learning style: the assessment of the ability to make needs known, consciousness, orientation (person, place, and time), expected emotional /behavioral responses, and learning preferences (eg, learning barriers, education  needs)  [x] For the neuromuscular system: a general assessment of gross coordinated movement (eg, balance, gait, locomotion, transfers, and transitions) and motor function  (motor control and motor learning)  [x] For the musculoskeletal system: the assessment of gross symmetry, gross range of motion, gross strength, height, and weight  [x] For the integumentary system: the assessment of pliability(texture), presence of scar formation, skin color, and skin integrity  [x] For cardiovascular/pulmonary system: the assessment of heart rate, respiratory rate, blood pressure, and edema     Activity limitations:    [x] Patient's cognitive status and saf ety concerns          [x] Status of current condition      [x] Weight bearing restriction  [] Cardiopulmunary Restriction    Participation Restrictions:   [] Goals and goal agreement with the patient     [] Rehab potential (prognosis) and probable outcome      Examination of Body System: (criteria)    [] 66581 - addressing 1-2 elements    [] 36022 - addressing a total of 3 or more elements     [x] 33250 -  Addressing a total of 4 or more elements         Clinical Presentation: (criteria)  Evolving - 86065     On examination of body system using standardized tests and measures patient presents with 4 or more elements from any of the following: body structures and functions, activity limitations, and/or participation restrictions.  Leading to a clinical presentation that is considered evolving with changing characteristics                              Clinical Decision Making  (Eval Complexity):  Moderate - 11841     Time Tracking:     PT Received On:  11/16/18  PT Start Time: 1610     PT Stop Time: 1623  PT Total Time (min): 13 min     Billable Minutes: Evaluation 13 min co-eval with OT      Mervat Seo, PT  11/16/2018

## 2018-11-17 NOTE — PROGRESS NOTES
Ortho Daily Progress Note    Marvin Ray is a 58 y.o. male admitted on 11/8/2018      Chief Complaint/Reason for admission: Leg Swelling (Left , stated sent by dr Peraza for evaluation of circulation problems , poor flow. )       Hospital Day: 9  Post Op Day: 1 Day Post-Op     The patient was seen and examined this morning at the bedside. Patient reports no acute issues overnight.  Patient reports that pain is adequately controlled.    _______________    Vitals:    11/17/18 0017 11/17/18 0558 11/17/18 0707 11/17/18 0850   BP: 128/79 132/83 (!) 142/82    Pulse: 91 99 80 77   Resp: 17 18 18    Temp: 97.4 °F (36.3 °C) 98 °F (36.7 °C) 98.6 °F (37 °C)    TempSrc: Oral Oral Oral    SpO2: (!) 92% 95% 97% 98%   Weight:       Height:           Vital Signs (Most Recent)  Temp: 98.6 °F (37 °C) (11/17/18 0707)  Pulse: 77 (11/17/18 0850)  Resp: 18 (11/17/18 0707)  BP: (!) 142/82 (11/17/18 0707)  SpO2: 98 % (11/17/18 0850)    Vital Signs Range (Last 24H):  Temp:  [96.2 °F (35.7 °C)-98.6 °F (37 °C)]   Pulse:  []   Resp:  [14-20]   BP: (127-153)/(79-85)   SpO2:  [92 %-100 %]       Physical:    AAOx3  Left foot with partial amputation with wound left open  Still with poor tissue quality and foul smell    Skin to left leg below knee is improved since the revascularization.      Recent Labs     11/15/18  0512 11/16/18  0531 11/17/18  0438 11/17/18  0908   K 4.4 4.6 4.8  --    CALCIUM 8.4* 8.5* 8.6*  --    WBC  --   --   --  10.25   HGB  --   --   --  9.7*   HCT  --   --   --  29.4*   PLT  --   --   --  345       I/O last 3 completed shifts:  In: 3404.2 [P.O.:800; I.V.:2504.2; IV Piggyback:100]  Out: 1200 [Urine:1200]          Assessment:  A/P Left foot s/p partial amputation and vascular procesdure              Plan:    We discussed the possible benefit of Left leg amputation   The family member and the patient would like to continue foot salvage. I think this is reasonable.    The skin to the left leg below the knee at  this point seems to be improving but still does not look ideal to heal a BKA.   May be beneficial to wait since he has had a good result with the revascularization to the leg. This could get a better flap for a BKA if that is needed in the future.       Tyrese Gray MD  [facility]

## 2018-11-17 NOTE — PLAN OF CARE
Problem: Physical Therapy Goal  Goal: Physical Therapy Goal  Goals to be met by: 18     Patient will increase functional independence with mobility by performin. Supine to sit with Modified [place]  2. Rolling to Left and Right with Modified [place]  3. Bed to chair transfer with Modified [place] using L Darco shoe and heel touch WB  4. Wheelchair propulsion x250 feet with Modified [place] using bilateral upper extremity  5. Upper/Lower extremity exercise program (seated/supine) 3 sets x10 reps per handout, with independence     Pt able to demonstrate MOD I with bed mobility, was knowledgeable of how to perform safe transfers however did not perform secondary to fatigue.

## 2018-11-17 NOTE — PROGRESS NOTES
Patient awake, alert, oriented resting comfortably. Report given to oncoming nurse, JILLIAN Yeager. 12hour chart check complete.

## 2018-11-17 NOTE — NURSING
Dressing change done as ordered. Small amount of serousanguaenous drainage on old dressing. No odor noted.

## 2018-11-17 NOTE — PLAN OF CARE
Problem: Physical Therapy Goal  Goal: Physical Therapy Goal  Goals to be met by: 18     Patient will increase functional independence with mobility by performin. Supine to sit with Modified [place]  2. Rolling to Left and Right with Modified [place]  3. Bed to chair transfer with Modified [place] using L Darco shoe and heel touch WB  4. Wheelchair propulsion x250 feet with Modified [place] using bilateral upper extremity  5. Upper/Lower extremity exercise program (seated/supine) 3 sets x10 reps per handout, with independence    Pt was able to perform bed<>BSC transfers with SBA using L Darco shoe and R tennis shoe.

## 2018-11-18 LAB
ALBUMIN SERPL BCP-MCNC: 2.1 G/DL
ALP SERPL-CCNC: 183 U/L
ALT SERPL W/O P-5'-P-CCNC: 20 U/L
ANION GAP SERPL CALC-SCNC: 5 MMOL/L
AST SERPL-CCNC: 31 U/L
BACTERIA SPEC ANAEROBE CULT: NORMAL
BILIRUB SERPL-MCNC: 0.6 MG/DL
BUN SERPL-MCNC: 33 MG/DL
CALCIUM SERPL-MCNC: 8.5 MG/DL
CHLORIDE SERPL-SCNC: 107 MMOL/L
CO2 SERPL-SCNC: 21 MMOL/L
CREAT SERPL-MCNC: 0.8 MG/DL
EST. GFR  (AFRICAN AMERICAN): >60 ML/MIN/1.73 M^2
EST. GFR  (NON AFRICAN AMERICAN): >60 ML/MIN/1.73 M^2
GLUCOSE SERPL-MCNC: 107 MG/DL
POCT GLUCOSE: 135 MG/DL (ref 70–110)
POCT GLUCOSE: 176 MG/DL (ref 70–110)
POCT GLUCOSE: 179 MG/DL (ref 70–110)
POCT GLUCOSE: 181 MG/DL (ref 70–110)
POTASSIUM SERPL-SCNC: 5.6 MMOL/L
PROT SERPL-MCNC: 7.2 G/DL
SODIUM SERPL-SCNC: 133 MMOL/L

## 2018-11-18 PROCEDURE — 25000003 PHARM REV CODE 250: Performed by: EMERGENCY MEDICINE

## 2018-11-18 PROCEDURE — 63600175 PHARM REV CODE 636 W HCPCS: Performed by: EMERGENCY MEDICINE

## 2018-11-18 PROCEDURE — 80053 COMPREHEN METABOLIC PANEL: CPT

## 2018-11-18 PROCEDURE — 25000003 PHARM REV CODE 250: Performed by: HOSPITALIST

## 2018-11-18 PROCEDURE — 25000003 PHARM REV CODE 250: Performed by: INTERNAL MEDICINE

## 2018-11-18 PROCEDURE — 21400001 HC TELEMETRY ROOM

## 2018-11-18 PROCEDURE — 36415 COLL VENOUS BLD VENIPUNCTURE: CPT

## 2018-11-18 RX ORDER — SODIUM CHLORIDE, SODIUM LACTATE, POTASSIUM CHLORIDE, CALCIUM CHLORIDE 600; 310; 30; 20 MG/100ML; MG/100ML; MG/100ML; MG/100ML
1000 INJECTION, SOLUTION INTRAVENOUS ONCE
Status: DISCONTINUED | OUTPATIENT
Start: 2018-11-18 | End: 2018-11-18

## 2018-11-18 RX ORDER — SODIUM CHLORIDE, SODIUM LACTATE, POTASSIUM CHLORIDE, CALCIUM CHLORIDE 600; 310; 30; 20 MG/100ML; MG/100ML; MG/100ML; MG/100ML
INJECTION, SOLUTION INTRAVENOUS CONTINUOUS
Status: DISCONTINUED | OUTPATIENT
Start: 2018-11-18 | End: 2018-11-18

## 2018-11-18 RX ORDER — LIDOCAINE HYDROCHLORIDE 10 MG/ML
1 INJECTION, SOLUTION EPIDURAL; INFILTRATION; INTRACAUDAL; PERINEURAL ONCE
Status: DISCONTINUED | OUTPATIENT
Start: 2018-11-18 | End: 2018-11-18

## 2018-11-18 RX ADMIN — INSULIN ASPART 5 UNITS: 100 INJECTION, SOLUTION INTRAVENOUS; SUBCUTANEOUS at 11:11

## 2018-11-18 RX ADMIN — INSULIN ASPART 5 UNITS: 100 INJECTION, SOLUTION INTRAVENOUS; SUBCUTANEOUS at 08:11

## 2018-11-18 RX ADMIN — STANDARDIZED SENNA CONCENTRATE AND DOCUSATE SODIUM 1 TABLET: 8.6; 5 TABLET, FILM COATED ORAL at 09:11

## 2018-11-18 RX ADMIN — BRIMONIDINE TARTRATE 1 DROP: 1 SOLUTION/ DROPS OPHTHALMIC at 05:11

## 2018-11-18 RX ADMIN — COLCHICINE 0.3 MG: 0.6 TABLET, FILM COATED ORAL at 08:11

## 2018-11-18 RX ADMIN — CARVEDILOL 6.25 MG: 6.25 TABLET, FILM COATED ORAL at 09:11

## 2018-11-18 RX ADMIN — DORZOLAMIDE HYDROCHLORIDE AND TIMOLOL MALEATE 1 DROP: 20; 5 SOLUTION/ DROPS OPHTHALMIC at 09:11

## 2018-11-18 RX ADMIN — STANDARDIZED SENNA CONCENTRATE AND DOCUSATE SODIUM 1 TABLET: 8.6; 5 TABLET, FILM COATED ORAL at 08:11

## 2018-11-18 RX ADMIN — DORZOLAMIDE HYDROCHLORIDE AND TIMOLOL MALEATE 1 DROP: 20; 5 SOLUTION/ DROPS OPHTHALMIC at 08:11

## 2018-11-18 RX ADMIN — PREDNISOLONE ACETATE 1 DROP: 10 SUSPENSION OPHTHALMIC at 05:11

## 2018-11-18 RX ADMIN — PREDNISOLONE ACETATE 1 DROP: 10 SUSPENSION OPHTHALMIC at 08:11

## 2018-11-18 RX ADMIN — BRIMONIDINE TARTRATE 1 DROP: 1 SOLUTION/ DROPS OPHTHALMIC at 09:11

## 2018-11-18 RX ADMIN — ASPIRIN 81 MG 81 MG: 81 TABLET ORAL at 08:11

## 2018-11-18 RX ADMIN — INSULIN ASPART 5 UNITS: 100 INJECTION, SOLUTION INTRAVENOUS; SUBCUTANEOUS at 05:11

## 2018-11-18 RX ADMIN — AMLODIPINE BESYLATE 10 MG: 5 TABLET ORAL at 08:11

## 2018-11-18 RX ADMIN — PREDNISOLONE ACETATE 1 DROP: 10 SUSPENSION OPHTHALMIC at 09:11

## 2018-11-18 RX ADMIN — SODIUM POLYSTYRENE SULFONATE 30 G: 15 SUSPENSION ORAL; RECTAL at 08:11

## 2018-11-18 RX ADMIN — EZETIMIBE 10 MG: 10 TABLET ORAL at 08:11

## 2018-11-18 RX ADMIN — ENOXAPARIN SODIUM 40 MG: 100 INJECTION SUBCUTANEOUS at 05:11

## 2018-11-18 RX ADMIN — CEFTRIAXONE 1 G: 1 INJECTION, SOLUTION INTRAVENOUS at 01:11

## 2018-11-18 RX ADMIN — CARVEDILOL 6.25 MG: 6.25 TABLET, FILM COATED ORAL at 08:11

## 2018-11-18 RX ADMIN — BRIMONIDINE TARTRATE 1 DROP: 1 SOLUTION/ DROPS OPHTHALMIC at 08:11

## 2018-11-18 NOTE — PROGRESS NOTES
[facility] Medicine  Progress Note    Patient Name: Marvin Ray  MRN: [id number]  Patient Class: IP- Inpatient   Admission Date: 11/8/2018  Length of Stay: 10 days  Attending Physician: Tree Ortega MD  Primary Care Provider: Rubén Davis MD        Subjective:     Principal Problem:Cellulitis of left lower leg    HPI:  57 yo male with CAD, HLP, hepatosplenomegaly/ascites, DM2 with CKD3 and retinopathy, PVD, gout, chronic combined CHF and chronic BLE wounds L>R presented from Dr. Chan office with concern for nonhealing wound to LLE. He was recently given bactrim and presented today with LAYNE, hyperkalemia and increased pain and edema to LLE. Pt has h/o Group G strep bacteremia (10/10). ICD implanted 11/2.  The patient's sister reports doing dressing changes of LE wounds 5 times a day. Noticeable weeping of fluid from the LE. Pt made NPO by vascular surgery and plan for angiogram and possible stent placement to LE to assist with better LE wound healing.     Hospital Course:  PT admitted with non-healing Left foot wounds. Plan for angiogram  By vascular surgery cancelled due to hyperkalemia/LAYNE.  Pt received kayexalate x 2 and  Potassium persistently high. Add calcium gluconate x one dose and IV lasix. Monitor K levels and plan for possible intervention in next few days if labs improve. Wound care consulted.     11/10- serum potassium elevated, check urine electrolytes; calcium IV given, pt frustrated about being in hospital and not able to ambulate on his own   11/11- persistent hyperkalemia in setting of improving LAYNE; pt received IV lasix, kayexalate x2, calcium gluconate 1500mg; insulin and dextrose today, lasix IV again and albuterol high dose neb x one  11/12- potassium 4.9; nephrology added sodium bicarb; vascular surgery planning intervention with improved labs   S/P angiogram,on 11.13.18 ,1. US guided R CFA access  2. Aortogram   3. LLE angiogram  4. L SFA  angioplasty with a 5 x 40 mm [place] balloon  5. L AT angioplasty with a 2 x 220 mm [place] balloon  6. L peroneal angioplasty with a 2 x 80 mm [place] balloon  7. Moderate sedation,  S/P wound debridemnemnt,intraoprative cultures are pending.growing gram negative and positive rods,cocci.  S/P toes 2-5 amputation.  He has hyperkalemia,will gave dose of kayexalate.      Interval History: pt has no new complaints     Review of Systems   Constitutional: Negative for chills and fever.   HENT: Negative for congestion.    Eyes: Negative for visual disturbance.   Respiratory: Negative for shortness of breath.    Cardiovascular: Negative for chest pain.   Gastrointestinal: Negative for abdominal pain.   Endocrine: Negative for polyphagia.   Genitourinary: Negative for dysuria.   Musculoskeletal: Negative for back pain.   Skin: Positive for color change and wound. Negative for pallor and rash.   Allergic/Immunologic: Negative for immunocompromised state.   Neurological: Negative for weakness.   Hematological: Does not bruise/bleed easily.   Psychiatric/Behavioral: Negative for confusion.     Objective:     Vital Signs (Most Recent):  Temp: 97.4 °F (36.3 °C) (11/18/18 0720)  Pulse: 77 (11/18/18 0720)  Resp: 18 (11/18/18 0720)  BP: 135/80 (11/18/18 0720)  SpO2: 96 % (11/18/18 0720) Vital Signs (24h Range):  Temp:  [97.4 °F (36.3 °C)-98.3 °F (36.8 °C)] 97.4 °F (36.3 °C)  Pulse:  [69-79] 77  Resp:  [18-20] 18  SpO2:  [96 %-98 %] 96 %  BP: (114-155)/(64-87) 135/80     Weight: 100.1 kg (220 lb 10.9 oz)  Body mass index is 31.66 kg/m².    Intake/Output Summary (Last 24 hours) at 11/18/2018 0837  Last data filed at 11/18/2018 0700  Gross per 24 hour   Intake 1500 ml   Output 1325 ml   Net 175 ml      Physical Exam   Constitutional: He is oriented to person, place, and time. He appears well-developed and well-nourished. No distress.   HENT:   Head: Normocephalic and atraumatic.   Eyes: Conjunctivae are normal.   Neck: Neck supple.    Cardiovascular: Normal rate.   Pulses:       Femoral pulses are 2+ on the right side, and 2+ on the left side.       Dorsalis pedis pulses are Detected w/ doppler on the right side, and Detected w/ doppler on the left side.        Posterior tibial pulses are Detected w/ doppler on the right side, and Detected w/ doppler on the left side.   Pulmonary/Chest: Effort normal. No respiratory distress. He exhibits no tenderness.   Abdominal: Soft. He exhibits no distension and no mass. There is no tenderness. There is no rebound and no guarding.   Musculoskeletal: Normal range of motion. He exhibits edema. He exhibits no tenderness or deformity.   Feet:   Right Foot:   Skin Integrity: Negative for ulcer, blister, skin breakdown, callus or dry skin.   Left Foot:   Skin Integrity: Positive for ulcer, blister, skin breakdown, callus and dry skin.   Neurological: He is alert and oriented to person, place, and time. No sensory deficit.   Skin: Skin is warm and dry. Capillary refill takes less than 2 seconds. No rash noted. No erythema. No pallor.   Psychiatric: He has a normal mood and affect.   Vitals reviewed.      Significant Labs:   BMP:   Recent Labs   Lab 11/18/18  0505      *   K 5.6*      CO2 21*   BUN 33*   CREATININE 0.8   CALCIUM 8.5*       Significant Imaging: I have reviewed and interpreted all pertinent imaging results/findings within the past 24 hours.    Assessment/Plan:      Hyperkalemia    He has hyperkalemia,will gave dose of kayexalate.       Acute renal failure superimposed on stage 3 chronic kidney disease    Pt has reported baseline CKD3  Recently placed on bactrim in addition to acei and aldactone/lasix - hold offending agents  Gentle IVF  BMP in am  Renal dose antibiotics  Avoid nephrotoxins        Open wound of left foot    Wound care consulted  See above        Chronic combined systolic and diastolic heart failure    EF 20%,No acute issues  Hold aldactone and acei insetting of  hyperkalemia and LAYNE   Resume BB  S/p ICD 11/2       Stage 3 chronic kidney disease    Will be monitored.       Hepatosplenomegaly    Recent paracentesis - negative culture   Monitor CMP             PVD (peripheral vascular disease)        Vascular surgery consulted  Pain control     S/P angiogram,on 11.13.18 ,1. US guided R CFA access  2. Aortogram   3. LLE angiogram  4. L SFA angioplasty with a 5 x 40 mm [place] balloon  5. L AT angioplasty with a 2 x 220 mm [place] balloon  6. L peroneal angioplasty with a 2 x 80 mm [place] balloon  7. Moderate sedation,  S/P wound debridemnemnt,intraoprative cultures are pending.  S/P toes 2-5 left amputation.growing gram negative and positive rods,cocci.     CAD (coronary artery disease)    No acute issues  Resume aspirin and zetia  Statin intolerance         Essential hypertension      Controlled  Resume amlodipine and BB  Hold aldactone and acei as stated above      DM type 2 with diabetic peripheral neuropathy    Uncontrolled, A1c 8.3%  Basal -bolus insulin   Diabetic diet             VTE Risk Mitigation (From admission, onward)        Ordered     enoxaparin injection 40 mg  Daily      11/08/18 1501     IP VTE HIGH RISK PATIENT  Once      11/08/18 1501              Tree Ortega MD  Department of Hospital Medicine   [facility]

## 2018-11-18 NOTE — SUBJECTIVE & OBJECTIVE
Interval History: pt has no new complaints     Review of Systems   Constitutional: Negative for chills and fever.   HENT: Negative for congestion.    Eyes: Negative for visual disturbance.   Respiratory: Negative for shortness of breath.    Cardiovascular: Negative for chest pain.   Gastrointestinal: Negative for abdominal pain.   Endocrine: Negative for polyphagia.   Genitourinary: Negative for dysuria.   Musculoskeletal: Negative for back pain.   Skin: Positive for color change and wound. Negative for pallor and rash.   Allergic/Immunologic: Negative for immunocompromised state.   Neurological: Negative for weakness.   Hematological: Does not bruise/bleed easily.   Psychiatric/Behavioral: Negative for confusion.     Objective:     Vital Signs (Most Recent):  Temp: 97.4 °F (36.3 °C) (11/18/18 0720)  Pulse: 77 (11/18/18 0720)  Resp: 18 (11/18/18 0720)  BP: 135/80 (11/18/18 0720)  SpO2: 96 % (11/18/18 0720) Vital Signs (24h Range):  Temp:  [97.4 °F (36.3 °C)-98.3 °F (36.8 °C)] 97.4 °F (36.3 °C)  Pulse:  [69-79] 77  Resp:  [18-20] 18  SpO2:  [96 %-98 %] 96 %  BP: (114-155)/(64-87) 135/80     Weight: 100.1 kg (220 lb 10.9 oz)  Body mass index is 31.66 kg/m².    Intake/Output Summary (Last 24 hours) at 11/18/2018 0837  Last data filed at 11/18/2018 0700  Gross per 24 hour   Intake 1500 ml   Output 1325 ml   Net 175 ml      Physical Exam   Constitutional: He is oriented to person, place, and time. He appears well-developed and well-nourished. No distress.   HENT:   Head: Normocephalic and atraumatic.   Eyes: Conjunctivae are normal.   Neck: Neck supple.   Cardiovascular: Normal rate.   Pulses:       Femoral pulses are 2+ on the right side, and 2+ on the left side.       Dorsalis pedis pulses are Detected w/ doppler on the right side, and Detected w/ doppler on the left side.        Posterior tibial pulses are Detected w/ doppler on the right side, and Detected w/ doppler on the left side.   Pulmonary/Chest: Effort  normal. No respiratory distress. He exhibits no tenderness.   Abdominal: Soft. He exhibits no distension and no mass. There is no tenderness. There is no rebound and no guarding.   Musculoskeletal: Normal range of motion. He exhibits edema. He exhibits no tenderness or deformity.   Feet:   Right Foot:   Skin Integrity: Negative for ulcer, blister, skin breakdown, callus or dry skin.   Left Foot:   Skin Integrity: Positive for ulcer, blister, skin breakdown, callus and dry skin.   Neurological: He is alert and oriented to person, place, and time. No sensory deficit.   Skin: Skin is warm and dry. Capillary refill takes less than 2 seconds. No rash noted. No erythema. No pallor.   Psychiatric: He has a normal mood and affect.   Vitals reviewed.      Significant Labs:   BMP:   Recent Labs   Lab 11/18/18  0505      *   K 5.6*      CO2 21*   BUN 33*   CREATININE 0.8   CALCIUM 8.5*       Significant Imaging: I have reviewed and interpreted all pertinent imaging results/findings within the past 24 hours.

## 2018-11-18 NOTE — PLAN OF CARE
Problem: Skin Integrity Impairment, Risk/Actual (Adult)  Intervention: Promote/Optimize Nutrition   11/18/18 0512   Nutrition Interventions   Oral Nutrition Promotion medicated;rest periods promoted;safe use of adaptive equipment encouraged     Intervention: Prevent/Manage Excess Moisture   11/18/18 0512   Skin Interventions   Skin Protection Adhesive use limited;Hydrocolloids     Intervention: Prevent/Minimize Sheer/Friction Injuries   11/15/18 0408 11/18/18 0512   Skin Interventions   Pressure Reduction Devices --  Heel offloading device utilized;Pressure-redistributing mattress utilized   Pressure Reduction Techniques --  frequent weight shift encouraged   Positioning   Positioning/Transfer Devices pillows;in use --        Goal: Identify Related Risk Factors and Signs and Symptoms  Related risk factors and signs and symptoms are identified upon initiation of Human Response Clinical Practice Guideline (CPG)  Outcome: Ongoing (interventions implemented as appropriate)   11/18/18 0512   Skin Integrity Impairment, Risk/Actual   Related Risk Factors (Skin Integrity Impairment, Risk/Actual) edema;environmental exposure;fluid/nutrition status;infection/disease process;skin disorders   Signs and Symptoms (Skin Integrity Impairment) bulla/blister/vesicle;edema;eschar and/or slough;plaques/scales     Goal: Skin Integrity/Wound Healing  Patient will demonstrate the desired outcomes by discharge/transition of care.  Outcome: Ongoing (interventions implemented as appropriate)   11/18/18 0512   Skin Integrity Impairment, Risk/Actual (Adult)   Skin Integrity/Wound Healing making progress toward outcome

## 2018-11-19 PROBLEM — L03.116 CELLULITIS OF LEFT LOWER EXTREMITY: Status: ACTIVE | Noted: 2018-11-19

## 2018-11-19 LAB
ALBUMIN SERPL BCP-MCNC: 2.1 G/DL
ALP SERPL-CCNC: 198 U/L
ALT SERPL W/O P-5'-P-CCNC: 27 U/L
ANION GAP SERPL CALC-SCNC: 7 MMOL/L
AST SERPL-CCNC: 25 U/L
BACTERIA SPEC AEROBE CULT: NORMAL
BILIRUB SERPL-MCNC: 0.6 MG/DL
BUN SERPL-MCNC: 30 MG/DL
CALCIUM SERPL-MCNC: 8.5 MG/DL
CHLORIDE SERPL-SCNC: 105 MMOL/L
CO2 SERPL-SCNC: 22 MMOL/L
CREAT SERPL-MCNC: 0.8 MG/DL
EST. GFR  (AFRICAN AMERICAN): >60 ML/MIN/1.73 M^2
EST. GFR  (NON AFRICAN AMERICAN): >60 ML/MIN/1.73 M^2
GLUCOSE SERPL-MCNC: 109 MG/DL
POCT GLUCOSE: 145 MG/DL (ref 70–110)
POCT GLUCOSE: 177 MG/DL (ref 70–110)
POCT GLUCOSE: 181 MG/DL (ref 70–110)
POCT GLUCOSE: 211 MG/DL (ref 70–110)
POTASSIUM SERPL-SCNC: 3.9 MMOL/L
PROT SERPL-MCNC: 7 G/DL
SODIUM SERPL-SCNC: 134 MMOL/L

## 2018-11-19 PROCEDURE — 36415 COLL VENOUS BLD VENIPUNCTURE: CPT

## 2018-11-19 PROCEDURE — 25000003 PHARM REV CODE 250: Performed by: HOSPITALIST

## 2018-11-19 PROCEDURE — 21400001 HC TELEMETRY ROOM

## 2018-11-19 PROCEDURE — 27000221 HC OXYGEN, UP TO 24 HOURS

## 2018-11-19 PROCEDURE — 63600175 PHARM REV CODE 636 W HCPCS: Performed by: EMERGENCY MEDICINE

## 2018-11-19 PROCEDURE — 97110 THERAPEUTIC EXERCISES: CPT

## 2018-11-19 PROCEDURE — 63600175 PHARM REV CODE 636 W HCPCS: Performed by: HOSPITALIST

## 2018-11-19 PROCEDURE — 25000003 PHARM REV CODE 250: Performed by: SURGERY

## 2018-11-19 PROCEDURE — 94761 N-INVAS EAR/PLS OXIMETRY MLT: CPT

## 2018-11-19 PROCEDURE — 25000003 PHARM REV CODE 250: Performed by: EMERGENCY MEDICINE

## 2018-11-19 PROCEDURE — 25000003 PHARM REV CODE 250: Performed by: INTERNAL MEDICINE

## 2018-11-19 PROCEDURE — 80053 COMPREHEN METABOLIC PANEL: CPT

## 2018-11-19 RX ORDER — CIPROFLOXACIN 2 MG/ML
400 INJECTION, SOLUTION INTRAVENOUS
Status: DISCONTINUED | OUTPATIENT
Start: 2018-11-19 | End: 2018-11-21 | Stop reason: HOSPADM

## 2018-11-19 RX ADMIN — BRIMONIDINE TARTRATE 1 DROP: 1 SOLUTION/ DROPS OPHTHALMIC at 09:11

## 2018-11-19 RX ADMIN — STANDARDIZED SENNA CONCENTRATE AND DOCUSATE SODIUM 1 TABLET: 8.6; 5 TABLET, FILM COATED ORAL at 09:11

## 2018-11-19 RX ADMIN — INSULIN ASPART 5 UNITS: 100 INJECTION, SOLUTION INTRAVENOUS; SUBCUTANEOUS at 05:11

## 2018-11-19 RX ADMIN — INSULIN ASPART 5 UNITS: 100 INJECTION, SOLUTION INTRAVENOUS; SUBCUTANEOUS at 12:11

## 2018-11-19 RX ADMIN — ENOXAPARIN SODIUM 40 MG: 100 INJECTION SUBCUTANEOUS at 05:11

## 2018-11-19 RX ADMIN — DORZOLAMIDE HYDROCHLORIDE AND TIMOLOL MALEATE 1 DROP: 20; 5 SOLUTION/ DROPS OPHTHALMIC at 09:11

## 2018-11-19 RX ADMIN — ACETAMINOPHEN 650 MG: 325 TABLET, FILM COATED ORAL at 02:11

## 2018-11-19 RX ADMIN — AMLODIPINE BESYLATE 10 MG: 5 TABLET ORAL at 09:11

## 2018-11-19 RX ADMIN — PREDNISOLONE ACETATE 1 DROP: 10 SUSPENSION OPHTHALMIC at 09:11

## 2018-11-19 RX ADMIN — CEFTRIAXONE 1 G: 1 INJECTION, SOLUTION INTRAVENOUS at 01:11

## 2018-11-19 RX ADMIN — COLLAGENASE SANTYL: 250 OINTMENT TOPICAL at 12:11

## 2018-11-19 RX ADMIN — BRIMONIDINE TARTRATE 1 DROP: 1 SOLUTION/ DROPS OPHTHALMIC at 05:11

## 2018-11-19 RX ADMIN — PREDNISOLONE ACETATE 1 DROP: 10 SUSPENSION OPHTHALMIC at 05:11

## 2018-11-19 RX ADMIN — EZETIMIBE 10 MG: 10 TABLET ORAL at 09:11

## 2018-11-19 RX ADMIN — CEFTRIAXONE 1 G: 1 INJECTION, SOLUTION INTRAVENOUS at 02:11

## 2018-11-19 RX ADMIN — CARVEDILOL 6.25 MG: 6.25 TABLET, FILM COATED ORAL at 09:11

## 2018-11-19 RX ADMIN — COLCHICINE 0.3 MG: 0.6 TABLET, FILM COATED ORAL at 09:11

## 2018-11-19 RX ADMIN — CIPROFLOXACIN 400 MG: 2 INJECTION, SOLUTION INTRAVENOUS at 11:11

## 2018-11-19 RX ADMIN — ASPIRIN 81 MG 81 MG: 81 TABLET ORAL at 09:11

## 2018-11-19 RX ADMIN — INSULIN ASPART 5 UNITS: 100 INJECTION, SOLUTION INTRAVENOUS; SUBCUTANEOUS at 09:11

## 2018-11-19 RX ADMIN — INSULIN ASPART 2 UNITS: 100 INJECTION, SOLUTION INTRAVENOUS; SUBCUTANEOUS at 12:11

## 2018-11-19 RX ADMIN — OXYCODONE HYDROCHLORIDE AND ACETAMINOPHEN 1 TABLET: 7.5; 325 TABLET ORAL at 09:11

## 2018-11-19 RX ADMIN — OXYCODONE HYDROCHLORIDE AND ACETAMINOPHEN 1 TABLET: 7.5; 325 TABLET ORAL at 05:11

## 2018-11-19 NOTE — PLAN OF CARE
Problem: Physical Therapy Goal  Goal: Physical Therapy Goal  Goals to be met by: 18     Patient will increase functional independence with mobility by performin. Supine to sit with Modified [place]  2. Rolling to Left and Right with Modified [place]  3. Bed to chair transfer with Modified [place] using L Darco shoe and heel touch WB  4. Wheelchair propulsion x250 feet with Modified [place] using bilateral upper extremity  5. Upper/Lower extremity exercise program (seated/supine) 3 sets x10 reps per handout, with independence     Outcome: Ongoing (interventions implemented as appropriate)  Pt will benefit from further therapy in order to get back to PLOF.

## 2018-11-19 NOTE — PLAN OF CARE
11/19/18 1226   Discharge Reassessment   Assessment Type Discharge Planning Reassessment   Provided patient/caregiver education on the expected discharge date and the discharge plan Yes   Do you have any problems affording any of your prescribed medications? Yes   Discharge Plan A Home with family;Home;Home Health   Discharge Plan B Home with family;Home;Home Health   Anticipated Discharge Disposition Home-Health   Can the patient answer the patient profile reliably? Yes, cognitively intact   How does the patient rate their overall health at the present time? Fair   Describe the patient's ability to walk at the present time. Walks with the help of equipment   How often would a person be available to care for the patient? Whenever needed   Number of comorbid conditions (as recorded on the chart) Five or more   During the past month, has the patient often been bothered by feeling down, depressed or hopeless? No   During the past month, has the patient often been bothered by little interest or pleasure in doing things? No   Patient will discharge home and resume services with [facility] when ready for discharge. Possible debridement of foot tomorrow.

## 2018-11-19 NOTE — ASSESSMENT & PLAN NOTE
Vascular surgery consulted  Pain control     S/P angiogram,on 11.13.18 ,1. US guided R CFA access  2. Aortogram   3. LLE angiogram  4. L SFA angioplasty with a 5 x 40 mm Oden balloon  5. L AT angioplasty with a 2 x 220 mm Tennga balloon  6. L peroneal angioplasty with a 2 x 80 mm Tennga balloon  7. Moderate sedation,  S/P wound debridemnemnt,intraoprative cultures grow acinobacter,strep,enterococus,adjyusted IV Abx,DC clindamycin,started on Cipro,continue with Rocephin per sensitivity.  S/P toes 2-5 left amputation.growing gram negative and positive rods,cocci.

## 2018-11-19 NOTE — PROGRESS NOTES
Left foot wound inspected during dressing change with Richard. Wound care discussed with Dr. Chan this am. Scant amount of drainage from left foot wounds.   Photodocumentation:    Left dorsal foot- Yellow roof; red catheters remain in place    Left lateral foot/amputation site of 2-5th toes- Base of amputation site light red in color and yellow tissue in base of wounds on lateral foot.    Left medial foot- Base of wound remains yellow in color. Red rubber catheters in place.   Left lower leg with moderate amount of edema.   Continues to keep heel suspended off bed with Sundance boot.  Wound care discussed with nursing, patient, and caregiver at bedside.

## 2018-11-19 NOTE — SUBJECTIVE & OBJECTIVE
Interval History: pt has no new complaints     Review of Systems   Constitutional: Negative for chills and fever.   HENT: Negative for congestion.    Eyes: Negative for visual disturbance.   Respiratory: Negative for shortness of breath.    Cardiovascular: Negative for chest pain.   Gastrointestinal: Negative for abdominal pain.   Endocrine: Negative for polyphagia.   Genitourinary: Negative for dysuria.   Musculoskeletal: Negative for back pain.   Skin: Positive for color change and wound. Negative for pallor and rash.   Allergic/Immunologic: Negative for immunocompromised state.   Neurological: Negative for weakness.   Hematological: Does not bruise/bleed easily.   Psychiatric/Behavioral: Negative for confusion.     Objective:     Vital Signs (Most Recent):  Temp: 98.4 °F (36.9 °C) (11/19/18 0801)  Pulse: 83 (11/19/18 0801)  Resp: 20 (11/19/18 0801)  BP: 136/78 (11/19/18 0801)  SpO2: 95 % (11/19/18 0801) Vital Signs (24h Range):  Temp:  [97.3 °F (36.3 °C)-98.4 °F (36.9 °C)] 98.4 °F (36.9 °C)  Pulse:  [73-85] 83  Resp:  [18-20] 20  SpO2:  [93 %-99 %] 95 %  BP: (121-165)/(74-82) 136/78     Weight: 100.1 kg (220 lb 10.9 oz)  Body mass index is 31.66 kg/m².    Intake/Output Summary (Last 24 hours) at 11/19/2018 1049  Last data filed at 11/19/2018 0600  Gross per 24 hour   Intake 460 ml   Output 675 ml   Net -215 ml      Physical Exam   Constitutional: He is oriented to person, place, and time. He appears well-developed and well-nourished. No distress.   HENT:   Head: Normocephalic and atraumatic.   Eyes: Conjunctivae are normal.   Neck: Neck supple.   Cardiovascular: Normal rate.   Pulses:       Femoral pulses are 2+ on the right side, and 2+ on the left side.       Dorsalis pedis pulses are Detected w/ doppler on the right side, and Detected w/ doppler on the left side.        Posterior tibial pulses are Detected w/ doppler on the right side, and Detected w/ doppler on the left side.   Pulmonary/Chest: Effort normal.  No respiratory distress. He exhibits no tenderness.   Abdominal: Soft. He exhibits no distension and no mass. There is no tenderness. There is no rebound and no guarding.   Musculoskeletal: Normal range of motion. He exhibits edema. He exhibits no tenderness or deformity.   Feet:   Right Foot:   Skin Integrity: Negative for ulcer, blister, skin breakdown, callus or dry skin.   Left Foot:   Skin Integrity: Positive for ulcer, blister, skin breakdown, callus and dry skin.   Neurological: He is alert and oriented to person, place, and time. No sensory deficit.   Skin: Skin is warm and dry. Capillary refill takes less than 2 seconds. No rash noted. No erythema. No pallor.   Psychiatric: He has a normal mood and affect.   Vitals reviewed.      Significant Labs:   BMP:   Recent Labs   Lab 11/19/18  0458      *   K 3.9      CO2 22*   BUN 30*   CREATININE 0.8   CALCIUM 8.5*       Significant Imaging: I have reviewed and interpreted all pertinent imaging results/findings within the past 24 hours.

## 2018-11-19 NOTE — PROGRESS NOTES
" [facility]  Adult Nutrition  Progress Note    SUMMARY       Recommendations    Recommendation: Continue ADA diet.  Goals: Meet >85% EEN  Nutrition Goal Status: goal met  Communication of RD Recs: other (comment)(Plan of care)    Reason for Assessment    Reason for Assessment: RD follow-up  Diagnosis: (Cellulitis)  Relevant Medical History: DM, HTN, HLD, gout, CKD, CAD  General Information Comments: Spoke with pt and wife in room. Denies any issues. Reports good appetite. Ate 100% of breakfast.  Nutrition Discharge Planning: ADA diet to meet estimated needs.    Nutrition Risk Screen    Nutrition Risk Screen: large or nonhealing wound, burn or pressure ulcer    Nutrition/Diet History    Food Preferences: Denies  Do you have any cultural, spiritual, Catholic conflicts, given your current situation?: none  Food Allergies: NKFA  Factors Affecting Nutritional Intake: None identified at this time    Anthropometrics    Temp: 97.6 °F (36.4 °C)  Height Method: Stated  Height: 5' 10" (177.8 cm)  Height (inches): 70 in  Weight Method: Bed Scale  Weight: 100.1 kg (220 lb 10.9 oz)  Weight (lb): 220.68 lb  Ideal Body Weight (IBW), Male: 166 lb  % Ideal Body Weight, Male (lb): 129.22 lb  BMI (Calculated): 30.8  BMI Grade: 30 - 34.9- obesity - grade I       Lab/Procedures/Meds    Pertinent Labs Reviewed: reviewed  Pertinent Labs Comments: Na 134L, BUN 30H, Ca 8.5L, Alb 2.1L  Pertinent Medications Reviewed: reviewed  Pertinent Medications Comments: abx, furosemide, lactulose    Physical Findings/Assessment    Overall Physical Appearance: obese  Oral/Mouth Cavity: tooth/teeth missing  Skin: non-healing wound(s)(DM ulcer x 3)    Estimated/Assessed Needs    Weight Used For Calorie Calculations: 97.3 kg (214 lb 8.1 oz)  Energy Calorie Requirements (kcal): 1800 kcal (RMR for obesity)  Energy Need Method: Wayne Espitia  Protein Requirements: 97g (1.0g/kg)  Weight Used For Protein Calculations: 97.3 kg (214 lb 8.1 " oz)  Fluid Need Method: RDA Method  RDA Method (mL): 1800  CHO Requirement: 225g      Nutrition Prescription Ordered    Current Diet Order: ADA/Renal    Evaluation of Received Nutrient/Fluid Intake    I/O: reviewed  Energy Calories Required: meeting needs  Protein Required: meeting needs  Fluid Required: (per MD)  Comments: LMB: 11/15  Tolerance: tolerating  % Intake of Estimated Energy Needs: 75 - 100 %  % Meal Intake: 75 - 100 %    Nutrition Risk    Level of Risk/Frequency of Follow-up: (1 x week)     Assessment and Plan    Nutrition Problem  Increased nutrient needs     Related to (etiology):   Physiological needs with healing     Signs and Symptoms (as evidenced by):   Multiple foot wounds;      Interventions:  Consistent CHO diet  Commercial Beverage (low CHO)     Nutrition Diagnosis Status:   Continues      Monitor and Evaluation    Food and Nutrient Intake: energy intake, food and beverage intake  Food and Nutrient Adminstration: diet order  Knowledge/Beliefs/Attitudes: food and nutrition knowledge/skill  Physical Activity and Function: nutrition-related ADLs and IADLs  Anthropometric Measurements: weight, weight change  Biochemical Data, Medical Tests and Procedures: electrolyte and renal panel, glucose/endocrine profile  Nutrition-Focused Physical Findings: overall appearance     Nutrition Follow-Up    RD Follow-up?: Yes

## 2018-11-19 NOTE — PT/OT/SLP PROGRESS
"Physical Therapy Treatment    Patient Name:  Marvin Ray   MRN:  [id number]    Recommendations:     Discharge Recommendations:  home with home health(with family assistance)    Discharge Equipment Recommendations:  None   Barriers to discharge: None    Assessment:     Marvin Ray is a 58 y.o. male admitted with a medical diagnosis of Cellulitis of left lower leg.  He presents with the following impairments/functional limitations:  weakness, impaired endurance, impaired self care skills, impaired functional mobilty, gait instability, impaired balance, decreased upper extremity function, decreased ROM, edema, decreased lower extremity function, orthopedic precautions, impaired skin, pain, decreased safety awareness, impaired cardiopulmonary response to activity . Pt agreeable to supine ex's only, pt c/o fatigue 2* pt has been up to use bedside commode several times prior treatment. Pt required encouragement to participate in therapy today.     Rehab Prognosis:  Fair + ; patient would benefit from acute skilled PT services to address these deficits and reach maximum level of function.      Recent Surgery: Procedure(s) (LRB):  INCISION AND DRAINAGE (Left)  AMPUTATION, TOES  2-5 (Left) 3 Days Post-Op    Plan:     During this hospitalization, patient to be seen 3 x/week to address the above listed problems via therapeutic activities, therapeutic exercises  · Plan of Care Expires:  11/30/18   Plan of Care Reviewed with: patient    Subjective     Communicated with nurse Harris prior to session.  Patient found in bed upon PT entry to room, agreeable to treatment.      Chief Complaint: fatigue   Patient comments/goals: " I just got back to bed "   Pain/Comfort:  · Pain Rating 1: 0/10  · Pain Rating Post-Intervention 1: 0/10    Patients cultural, spiritual, Adventism conflicts given the current situation: none    Objective:     Patient found with: bed alarm, telemetry, oxygen, pressure relief boots     General " Precautions: Standard, fall   Orthopedic Precautions:(Pt reported that Dr. Chan is allowing him to L heel touch with Darco shoe for transfers only, no ambulation at this time.  Pt has been transferring from [facility] [place] nursing staff and sister.)   Braces:   (L Darco shoe and R tennis shoe)         AM-PAC 6 CLICK MOBILITY  Turning over in bed (including adjusting bedclothes, sheets and blankets)?: 4  Sitting down on and standing up from a chair with arms (e.g., wheelchair, bedside commode, etc.): 3  Moving from lying on back to sitting on the side of the bed?: 4  Moving to and from a bed to a chair (including a wheelchair)?: 3  Need to walk in hospital room?: 1  Climbing 3-5 steps with a railing?: 1  Basic Mobility Total Score: 16       Therapeutic Activities and Exercises:   pt performed supine BLE ex's x 10 reps : AP, SAQ, HS, QS, SLR , hip ABD/DD and BUE ex's x 10 reps elbow flexion and extension and shoulder ADB/ADD with green thera-band. V/T cues for proper technique and sequence. Pt tolerated.     Patient left HOB elevated  Pressure relief boot placed LLE with all lines intact, call button in reach, bed alarm on and  nurse notified..    GOALS:   Multidisciplinary Problems     Physical Therapy Goals        Problem: Physical Therapy Goal    Goal Priority Disciplines Outcome Goal Variances Interventions   Physical Therapy Goal     PT, PT/OT Ongoing (interventions implemented as appropriate)     Description:  Goals to be met by: 18     Patient will increase functional independence with mobility by performin. Supine to sit with Modified [place]  2. Rolling to Left and Right with Modified [place]  3. Bed to chair transfer with Modified [place] using L Darco shoe and heel touch WB  4. Wheelchair propulsion x250 feet with Modified [place] using bilateral upper extremity  5. Upper/Lower extremity exercise program (seated/supine) 3 sets x10 reps per handout, with independence                       Time Tracking:     PT Received On: 11/19/18  PT Start Time: 1053     PT Stop Time: 1103  PT Total Time (min): 10 min     Billable Minutes: Therapeutic Exercise 10    Treatment Type: Treatment  PT/PTA: PTA     PTA Visit Number: 2     More Howe, PTA  11/19/2018

## 2018-11-19 NOTE — PROGRESS NOTES
[facility] [place], [place] Medicine  Progress Note    Patient Name: Marvin Ray  MRN: [id number]  Patient Class: IP- Inpatient   Admission Date: 11/8/2018  Length of Stay: 11 days  Attending Physician: Tree Ortega MD  Primary Care Provider: Rubén Davis MD        Subjective:     Principal Problem:Cellulitis of left lower leg    HPI:  57 yo male with CAD, HLP, hepatosplenomegaly/ascites, DM2 with CKD3 and retinopathy, PVD, gout, chronic combined CHF and chronic BLE wounds L>R presented from Dr. Chan office with concern for nonhealing wound to LLE. He was recently given bactrim and presented today with LAYNE, hyperkalemia and increased pain and edema to LLE. Pt has h/o Group G strep bacteremia (10/10). ICD implanted 11/2.  The patient's sister reports doing dressing changes of LE wounds 5 times a day. Noticeable weeping of fluid from the LE. Pt made NPO by vascular surgery and plan for angiogram and possible stent placement to LE to assist with better LE wound healing.     Hospital Course:  PT admitted with non-healing Left foot wounds. Plan for angiogram  By vascular surgery cancelled due to hyperkalemia/LAYNE.  Pt received kayexalate x 2 and  Potassium persistently high. Add calcium gluconate x one dose and IV lasix. Monitor K levels and plan for possible intervention in next few days if labs improve. Wound care consulted.     11/10- serum potassium elevated, check urine electrolytes; calcium IV given, pt frustrated about being in hospital and not able to ambulate on his own   11/11- persistent hyperkalemia in setting of improving LAYNE; pt received IV lasix, kayexalate x2, calcium gluconate 1500mg; insulin and dextrose today, lasix IV again and albuterol high dose neb x one  11/12- potassium 4.9; nephrology added sodium bicarb; vascular surgery planning intervention with improved labs   S/P angiogram,on 11.13.18 ,1. US guided R CFA access  2. Aortogram   3. LLE angiogram  4. L SFA  angioplasty with a 5 x 40 mm [place] balloon  5. L AT angioplasty with a 2 x 220 mm [place] balloon  6. L peroneal angioplasty with a 2 x 80 mm [place] balloon  7. Moderate sedation,  S/P wound debridemnemnt,intraoprative cultures grow acinobacter,strep,enterococus,adjyusted IV Abx,DC clindamycin,started on Cipro,continue with Rocephin per sensitivity.  S/P toes 2-5 amputation.  He has hyperkalemia,will gave dose of kayexalate.imroved.      Interval History: pt has no new complaints     Review of Systems   Constitutional: Negative for chills and fever.   HENT: Negative for congestion.    Eyes: Negative for visual disturbance.   Respiratory: Negative for shortness of breath.    Cardiovascular: Negative for chest pain.   Gastrointestinal: Negative for abdominal pain.   Endocrine: Negative for polyphagia.   Genitourinary: Negative for dysuria.   Musculoskeletal: Negative for back pain.   Skin: Positive for color change and wound. Negative for pallor and rash.   Allergic/Immunologic: Negative for immunocompromised state.   Neurological: Negative for weakness.   Hematological: Does not bruise/bleed easily.   Psychiatric/Behavioral: Negative for confusion.     Objective:     Vital Signs (Most Recent):  Temp: 98.4 °F (36.9 °C) (11/19/18 0801)  Pulse: 83 (11/19/18 0801)  Resp: 20 (11/19/18 0801)  BP: 136/78 (11/19/18 0801)  SpO2: 95 % (11/19/18 0801) Vital Signs (24h Range):  Temp:  [97.3 °F (36.3 °C)-98.4 °F (36.9 °C)] 98.4 °F (36.9 °C)  Pulse:  [73-85] 83  Resp:  [18-20] 20  SpO2:  [93 %-99 %] 95 %  BP: (121-165)/(74-82) 136/78     Weight: 100.1 kg (220 lb 10.9 oz)  Body mass index is 31.66 kg/m².    Intake/Output Summary (Last 24 hours) at 11/19/2018 1049  Last data filed at 11/19/2018 0600  Gross per 24 hour   Intake 460 ml   Output 675 ml   Net -215 ml      Physical Exam   Constitutional: He is oriented to person, place, and time. He appears well-developed and well-nourished. No distress.   HENT:   Head: Normocephalic  and atraumatic.   Eyes: Conjunctivae are normal.   Neck: Neck supple.   Cardiovascular: Normal rate.   Pulses:       Femoral pulses are 2+ on the right side, and 2+ on the left side.       Dorsalis pedis pulses are Detected w/ doppler on the right side, and Detected w/ doppler on the left side.        Posterior tibial pulses are Detected w/ doppler on the right side, and Detected w/ doppler on the left side.   Pulmonary/Chest: Effort normal. No respiratory distress. He exhibits no tenderness.   Abdominal: Soft. He exhibits no distension and no mass. There is no tenderness. There is no rebound and no guarding.   Musculoskeletal: Normal range of motion. He exhibits edema. He exhibits no tenderness or deformity.   Feet:   Right Foot:   Skin Integrity: Negative for ulcer, blister, skin breakdown, callus or dry skin.   Left Foot:   Skin Integrity: Positive for ulcer, blister, skin breakdown, callus and dry skin.   Neurological: He is alert and oriented to person, place, and time. No sensory deficit.   Skin: Skin is warm and dry. Capillary refill takes less than 2 seconds. No rash noted. No erythema. No pallor.   Psychiatric: He has a normal mood and affect.   Vitals reviewed.      Significant Labs:   BMP:   Recent Labs   Lab 11/19/18  0458      *   K 3.9      CO2 22*   BUN 30*   CREATININE 0.8   CALCIUM 8.5*       Significant Imaging: I have reviewed and interpreted all pertinent imaging results/findings within the past 24 hours.    Assessment/Plan:      Cellulitis of left lower extremity    See PVD.       Hyperkalemia    He has hyperkalemia,will gave dose of kayexalate.       Acute renal failure superimposed on stage 3 chronic kidney disease    Pt has reported baseline CKD3  Recently placed on bactrim in addition to acei and aldactone/lasix - hold offending agents  Gentle IVF  BMP in am  Renal dose antibiotics  Avoid nephrotoxins        Open wound of left foot    Wound care consulted  See above         Chronic combined systolic and diastolic heart failure    EF 20%,No acute issues  Hold aldactone and acei insetting of hyperkalemia and LAYNE   Resume BB  S/p ICD 11/2       Stage 3 chronic kidney disease    Will be monitored.       Hepatosplenomegaly    Recent paracentesis - negative culture   Monitor CMP             PVD (peripheral vascular disease)        Vascular surgery consulted  Pain control     S/P angiogram,on 11.13.18 ,1. US guided R CFA access  2. Aortogram   3. LLE angiogram  4. L SFA angioplasty with a 5 x 40 mm [place] balloon  5. L AT angioplasty with a 2 x 220 mm [place] balloon  6. L peroneal angioplasty with a 2 x 80 mm [place] balloon  7. Moderate sedation,  S/P wound debridemnemnt,intraoprative cultures grow acinobacter,strep,enterococus,adjyusted IV Abx,DC clindamycin,started on Cipro,continue with Rocephin per sensitivity.  S/P toes 2-5 left amputation.growing gram negative and positive rods,cocci.     CAD (coronary artery disease)    No acute issues  Resume aspirin and zetia  Statin intolerance         Essential hypertension      Controlled  Resume amlodipine and BB  Hold aldactone and acei as stated above      DM type 2 with diabetic peripheral neuropathy    Uncontrolled, A1c 8.3%  Basal -bolus insulin   Diabetic diet             VTE Risk Mitigation (From admission, onward)        Ordered     enoxaparin injection 40 mg  Daily      11/08/18 1501     IP VTE HIGH RISK PATIENT  Once      11/08/18 1501              Tree Ortega MD  Department of Hospital Medicine   [facility]

## 2018-11-19 NOTE — PLAN OF CARE
Problem: Skin Integrity Impairment, Risk/Actual (Adult)  Intervention: Promote/Optimize Nutrition   11/18/18 0512   Nutrition Interventions   Oral Nutrition Promotion medicated;rest periods promoted;safe use of adaptive equipment encouraged     Intervention: Prevent/Manage Excess Moisture   11/19/18 0139   Skin Interventions   Skin Protection Adhesive use limited;Hydrocolloids       Goal: Identify Related Risk Factors and Signs and Symptoms  Related risk factors and signs and symptoms are identified upon initiation of Human Response Clinical Practice Guideline (CPG)   11/19/18 0139   Skin Integrity Impairment, Risk/Actual   Related Risk Factors (Skin Integrity Impairment, Risk/Actual) edema;environmental exposure;infection/disease process;mechanical factors;skin disorders;tissue perfusion impaired   Signs and Symptoms (Skin Integrity Impairment) bulla/blister/vesicle;edema;eschar and/or slough;plaques/scales

## 2018-11-20 LAB
ALBUMIN SERPL BCP-MCNC: 2 G/DL
ALP SERPL-CCNC: 191 U/L
ALT SERPL W/O P-5'-P-CCNC: 24 U/L
ANION GAP SERPL CALC-SCNC: 4 MMOL/L
AST SERPL-CCNC: 22 U/L
BACTERIA SPEC ANAEROBE CULT: NORMAL
BILIRUB SERPL-MCNC: 0.5 MG/DL
BUN SERPL-MCNC: 36 MG/DL
CALCIUM SERPL-MCNC: 8.4 MG/DL
CHLORIDE SERPL-SCNC: 103 MMOL/L
CO2 SERPL-SCNC: 25 MMOL/L
CREAT SERPL-MCNC: 0.8 MG/DL
EST. GFR  (AFRICAN AMERICAN): >60 ML/MIN/1.73 M^2
EST. GFR  (NON AFRICAN AMERICAN): >60 ML/MIN/1.73 M^2
GLUCOSE SERPL-MCNC: 171 MG/DL
POCT GLUCOSE: 174 MG/DL (ref 70–110)
POCT GLUCOSE: 175 MG/DL (ref 70–110)
POCT GLUCOSE: 177 MG/DL (ref 70–110)
POCT GLUCOSE: 183 MG/DL (ref 70–110)
POTASSIUM SERPL-SCNC: 4.3 MMOL/L
PROT SERPL-MCNC: 7 G/DL
SODIUM SERPL-SCNC: 132 MMOL/L

## 2018-11-20 PROCEDURE — 25000003 PHARM REV CODE 250: Performed by: EMERGENCY MEDICINE

## 2018-11-20 PROCEDURE — 63600175 PHARM REV CODE 636 W HCPCS: Performed by: EMERGENCY MEDICINE

## 2018-11-20 PROCEDURE — 25000003 PHARM REV CODE 250: Performed by: HOSPITALIST

## 2018-11-20 PROCEDURE — 21400001 HC TELEMETRY ROOM

## 2018-11-20 PROCEDURE — 36415 COLL VENOUS BLD VENIPUNCTURE: CPT

## 2018-11-20 PROCEDURE — 94761 N-INVAS EAR/PLS OXIMETRY MLT: CPT

## 2018-11-20 PROCEDURE — 99024 POSTOP FOLLOW-UP VISIT: CPT | Mod: ,,, | Performed by: SURGERY

## 2018-11-20 PROCEDURE — 63600175 PHARM REV CODE 636 W HCPCS: Performed by: HOSPITALIST

## 2018-11-20 PROCEDURE — 94760 N-INVAS EAR/PLS OXIMETRY 1: CPT

## 2018-11-20 PROCEDURE — 80053 COMPREHEN METABOLIC PANEL: CPT

## 2018-11-20 RX ADMIN — CEFTRIAXONE 1 G: 1 INJECTION, SOLUTION INTRAVENOUS at 01:11

## 2018-11-20 RX ADMIN — OXYCODONE HYDROCHLORIDE AND ACETAMINOPHEN 1 TABLET: 7.5; 325 TABLET ORAL at 09:11

## 2018-11-20 RX ADMIN — DORZOLAMIDE HYDROCHLORIDE AND TIMOLOL MALEATE 1 DROP: 20; 5 SOLUTION/ DROPS OPHTHALMIC at 09:11

## 2018-11-20 RX ADMIN — OXYCODONE HYDROCHLORIDE AND ACETAMINOPHEN 1 TABLET: 7.5; 325 TABLET ORAL at 12:11

## 2018-11-20 RX ADMIN — CIPROFLOXACIN 400 MG: 2 INJECTION, SOLUTION INTRAVENOUS at 12:11

## 2018-11-20 RX ADMIN — PREDNISOLONE ACETATE 1 DROP: 10 SUSPENSION OPHTHALMIC at 09:11

## 2018-11-20 RX ADMIN — STANDARDIZED SENNA CONCENTRATE AND DOCUSATE SODIUM 1 TABLET: 8.6; 5 TABLET, FILM COATED ORAL at 08:11

## 2018-11-20 RX ADMIN — OXYCODONE HYDROCHLORIDE AND ACETAMINOPHEN 1 TABLET: 7.5; 325 TABLET ORAL at 07:11

## 2018-11-20 RX ADMIN — CIPROFLOXACIN 400 MG: 2 INJECTION, SOLUTION INTRAVENOUS at 11:11

## 2018-11-20 RX ADMIN — BRIMONIDINE TARTRATE 1 DROP: 1 SOLUTION/ DROPS OPHTHALMIC at 03:11

## 2018-11-20 RX ADMIN — INSULIN ASPART 5 UNITS: 100 INJECTION, SOLUTION INTRAVENOUS; SUBCUTANEOUS at 01:11

## 2018-11-20 RX ADMIN — BRIMONIDINE TARTRATE 1 DROP: 1 SOLUTION/ DROPS OPHTHALMIC at 09:11

## 2018-11-20 RX ADMIN — ENOXAPARIN SODIUM 40 MG: 100 INJECTION SUBCUTANEOUS at 05:11

## 2018-11-20 RX ADMIN — CARVEDILOL 6.25 MG: 6.25 TABLET, FILM COATED ORAL at 08:11

## 2018-11-20 RX ADMIN — DORZOLAMIDE HYDROCHLORIDE AND TIMOLOL MALEATE 1 DROP: 20; 5 SOLUTION/ DROPS OPHTHALMIC at 08:11

## 2018-11-20 RX ADMIN — PREDNISOLONE ACETATE 1 DROP: 10 SUSPENSION OPHTHALMIC at 03:11

## 2018-11-20 RX ADMIN — BRIMONIDINE TARTRATE 1 DROP: 1 SOLUTION/ DROPS OPHTHALMIC at 08:11

## 2018-11-20 RX ADMIN — INSULIN ASPART 5 UNITS: 100 INJECTION, SOLUTION INTRAVENOUS; SUBCUTANEOUS at 05:11

## 2018-11-20 NOTE — ASSESSMENT & PLAN NOTE
Vascular surgery consulted  Pain control     S/P angiogram,on 11.13.18 ,1. US guided R CFA access  2. Aortogram   3. LLE angiogram  4. L SFA angioplasty with a 5 x 40 mm [place] balloon  5. L AT angioplasty with a 2 x 220 mm [place] balloon  6. L peroneal angioplasty with a 2 x 80 mm [place] balloon  7. Moderate sedation,  S/P wound debridemnemnt,intraoprative cultures grow acinobacter,strep,enterococus,adjyusted IV Abx,DC clindamycin,started on Cipro,continue with Rocephin per sensitivity.  S/P toes 2-5 left amputation.grow acinobacter,strep,enterococus.  Will have local debridement today.

## 2018-11-20 NOTE — NURSING
Dressing change completed per MD order. Pt tolerated well, received medication prior to dressing change.

## 2018-11-20 NOTE — PLAN OF CARE
Problem: Diabetes, Type 2 (Adult)  Goal: Signs and Symptoms of Listed Potential Problems Will be Absent, Minimized or Managed (Diabetes, Type 2)  Signs and symptoms of listed potential problems will be absent, minimized or managed by discharge/transition of care (reference Diabetes, Type 2 (Adult) CPG).  Outcome: Ongoing (interventions implemented as appropriate)   11/19/18 1952   Diabetes, Type 2   Problems Assessed (Type 2 Diabetes) situational response   Problems Present (Type 2 Diabetes) situational response       Problem: Fall Risk (Adult)  Goal: Identify Related Risk Factors and Signs and Symptoms  Related risk factors and signs and symptoms are identified upon initiation of Human Response Clinical Practice Guideline (CPG)  Outcome: Ongoing (interventions implemented as appropriate)   11/19/18 1952   Fall Risk   Related Risk Factors (Fall Risk) culprit medication(s);gait/mobility problems;impaired vision;sensory deficits   Signs and Symptoms (Fall Risk) presence of risk factors       Problem: Patient Care Overview  Goal: Plan of Care Review  Outcome: Ongoing (interventions implemented as appropriate)   11/19/18 1952   Coping/Psychosocial   Plan Of Care Reviewed With patient;spouse;sibling      11/19/18 1952   Coping/Psychosocial   Plan Of Care Reviewed With patient;spouse;sibling;son       Problem: Pressure Ulcer Risk (Arash Scale) (Adult,Obstetrics,Pediatric)  Goal: Identify Related Risk Factors and Signs and Symptoms  Related risk factors and signs and symptoms are identified upon initiation of Human Response Clinical Practice Guideline (CPG)  Outcome: Ongoing (interventions implemented as appropriate)   11/19/18 1952   Pressure Ulcer Risk (Arash Scale)   Related Risk Factors (Pressure Ulcer Risk (Arash Scale)) hospitalization prolonged;mechanical forces;mobility impaired;tissue perfusion altered       Problem: Skin Integrity Impairment, Risk/Actual (Adult)  Goal: Identify Related Risk Factors and Signs  and Symptoms  Related risk factors and signs and symptoms are identified upon initiation of Human Response Clinical Practice Guideline (CPG)  Outcome: Ongoing (interventions implemented as appropriate)   11/19/18 1952   Skin Integrity Impairment, Risk/Actual   Related Risk Factors (Skin Integrity Impairment, Risk/Actual) infection/disease process;surgery/procedure;tissue perfusion impaired   Signs and Symptoms (Skin Integrity Impairment) eschar and/or slough

## 2018-11-20 NOTE — CONSULTS
Plastic Surgery Consult Note    57 yo male with CAD, HLP, hepatosplenomegaly/ascites, DM2 with CKD3 and retinopathy, PVD, gout, chronic combined CHF and chronic BLE wounds L>R presented from Dr. Chan office with concern for nonhealing wound to E. Since admission he has undergone multiple debridements and amputations of 4 toes as well as a LLE angiogram with angioplasty x 3. He's on IV Cipro and Rocephin for cultures positive for actinobacer and enterococcus. Plastic Surgery was consulted for evaluation of wound. Dressing changed today but photos in chart reviewed and there was a significant amount of necrotic tissue. We discussed with the patient that we could attempt another debridement but BKA may be a better option. We will come by in the morning to change his dressing and come up with a more definitive recommendation.     Lengthy discussion with patient and family.  He has moderately poorly controlled diabetes.  His A1C has improved from >14 to recent 8.   He has multilevel peripheral vascular disease, recently angioplasted. He  Has a complex wound with exposed bone (Metatarsal heads 2-5).  Despite debridement he has recurrent slough.      In my opinion,  It is unlikely that this wound will be graftable, but currently is not life threatening.  The family and the patient refuse to consider BKA at this point.  It will likely do no harm to try a period of wound care to see if this wound will granulate, but I seriously doubt that this will have a good outcome.  The patient's family want to return to care by Dr. Chou who took care of him in the past.  I will be happy to follow him if they want.       The final decision will be between a likely healable BKA vs. Long term wound care, deconditioning, risk of infection and non ambulation.

## 2018-11-20 NOTE — PROGRESS NOTES
[facility]  Vascular Surgery  Progress Note    Patient Name: Marvin Ray  MRN: [id number]  Admission Date: 11/8/2018  Primary Care Provider: Rubén Davis MD    Subjective:     Interval History: No complaints today.  Plastic Surgery consult for limb salvage options.    Post-Op Info:  Procedure(s) (LRB):  INCISION AND DRAINAGE (Left)  AMPUTATION, TOES  2-5 (Left)   4 Days Post-Op       Medications:  Continuous Infusions:  Scheduled Meds:   amLODIPine  10 mg Oral Daily    aspirin  81 mg Oral Daily    brimonidine 0.1%  1 drop Both Eyes TID    carvedilol  6.25 mg Oral BID    cefTRIAXone (ROCEPHIN) IVPB  1 g Intravenous Q12H    ciprofloxacin  400 mg Intravenous Q12H    colchicine  0.3 mg Oral Daily    collagenase   Topical (Top) Daily    dorzolamide-timolol 2-0.5%  1 drop Both Eyes BID    enoxaparin  40 mg Subcutaneous Daily    ezetimibe  10 mg Oral Daily    insulin aspart U-100  5 Units Subcutaneous TIDWM    prednisoLONE acetate  1 drop Right Eye TID    senna-docusate 8.6-50 mg  1 tablet Oral BID     PRN Meds:acetaminophen, dextrose 50%, dextrose 50%, glucagon (human recombinant), glucose, glucose, HYDROmorphone, influenza, insulin aspart U-100, metoclopramide HCl, morphine, oxyCODONE-acetaminophen, oxyCODONE-acetaminophen, sodium chloride 0.9%, sodium chloride 0.9%, sodium chloride 0.9%     Objective:     Vital Signs (Most Recent):  Temp: 97.9 °F (36.6 °C) (11/20/18 0726)  Pulse: 82 (11/20/18 0726)  Resp: 18 (11/20/18 0726)  BP: 123/84 (11/20/18 0726)  SpO2: 95 % (11/20/18 0726) Vital Signs (24h Range):  Temp:  [97.3 °F (36.3 °C)-98.2 °F (36.8 °C)] 97.9 °F (36.6 °C)  Pulse:  [73-82] 82  Resp:  [18] 18  SpO2:  [95 %-98 %] 95 %  BP: (120-137)/(65-84) 123/84     Date 11/20/18 0700 - 11/21/18 0659   Shift 3929-5925 2656-0166 5833-5402 24 Hour Total   INTAKE   P.O. 240   240   Shift Total(mL/kg) 240(2.4)   240(2.4)   OUTPUT   Shift Total(mL/kg)       Weight (kg) 100.1 100.1 100.1 100.1        Physical Exam   Constitutional: He is oriented to person, place, and time. He appears well-developed and well-nourished. No distress.   HENT:   Head: Normocephalic and atraumatic.   Eyes: Conjunctivae are normal.   Neck: Neck supple.   Cardiovascular: Normal rate.   Pulses:       Femoral pulses are 2+ on the right side, and 2+ on the left side.       Dorsalis pedis pulses are Detected w/ doppler on the right side, and Detected w/ doppler on the left side.        Posterior tibial pulses are Detected w/ doppler on the right side, and Detected w/ doppler on the left side.   Pulmonary/Chest: Effort normal. No respiratory distress. He exhibits no tenderness.   Abdominal: Soft. He exhibits no distension and no mass. There is no tenderness. There is no rebound and no guarding.   Musculoskeletal: Normal range of motion. He exhibits no edema, tenderness or deformity.   Feet:   Right Foot:   Skin Integrity: Negative for ulcer, blister, skin breakdown, callus or dry skin.   Left Foot:   Skin Integrity: Positive for ulcer, blister, skin breakdown, callus and dry skin.   Neurological: He is alert and oriented to person, place, and time. No sensory deficit.   Skin: Skin is warm and dry. Capillary refill takes less than 2 seconds. No rash noted. No erythema. No pallor.        Psychiatric: He has a normal mood and affect.   Vitals reviewed.      Significant Labs:  All pertinent labs from the last 24 hours have been reviewed.    Significant Diagnostics:  I have reviewed all pertinent imaging results/findings within the past 24 hours.    Assessment/Plan:     Open wound of left foot    -L foot wound multifactorial likely due to DM, PVD and venous insufficiency s/p LLE angiography with SFA, AT and peroneal angioplasty s/p L foot washout/debridement 11/14/18 s/p toe 2-5 amputations and foot debridement 11/16/18 - wound minimally improved after debridement, remains extensive; infection cleared.    -Ortho and Plastics have evaluated  wound without further interventions recommended at this time - I will continue debridements, wound care recs as pt and family desire to not have further amputation at this time; risks and benefits of continued wound care vs BKA d/w with pt and family who state understanding and desire to continue local wound care and have continued eval by [facility] and Podiatry  -Nutrition optimization  -Cont wound care consult recs  -Cont offloading  -Abx per primary team  -Strict glycemic control  -I will arrange close f/u with pt for wound recheck and have him see [facility] for comprehensive wound care with Plastic surgery and HBO eval         Mitch Chan MD  Vascular Surgery  [facility] [place]

## 2018-11-20 NOTE — PROGRESS NOTES
Patient to discharge home tomorrow to continue wound care with Home Health and [facility] outpatient (appointment 11/27 with Dr. Davis).  Recommend local wound care with Santyl to yellow slough and hydrofiber with Ag to amputation site of toes 2-5. Change dressings daily.   MD to write Rx for Santyl for amount for wound care for 1 month.   Will assist MD as needed with discharge plan.

## 2018-11-20 NOTE — NURSING
Removed staples from AICD PM site , Incision intact and well approximated.   Sterile dressing applied

## 2018-11-20 NOTE — SUBJECTIVE & OBJECTIVE
Interval History: pt has no new complaints     Review of Systems   Constitutional: Negative for chills and fever.   HENT: Negative for congestion.    Eyes: Negative for visual disturbance.   Respiratory: Negative for shortness of breath.    Cardiovascular: Negative for chest pain.   Gastrointestinal: Negative for abdominal pain.   Endocrine: Negative for polyphagia.   Genitourinary: Negative for dysuria.   Musculoskeletal: Negative for back pain.   Skin: Positive for color change and wound. Negative for pallor and rash.   Allergic/Immunologic: Negative for immunocompromised state.   Neurological: Negative for weakness.   Hematological: Does not bruise/bleed easily.   Psychiatric/Behavioral: Negative for confusion.     Objective:     Vital Signs (Most Recent):  Temp: 97.9 °F (36.6 °C) (11/20/18 0726)  Pulse: 81 (11/20/18 0800)  Resp: 18 (11/20/18 0726)  BP: 123/84 (11/20/18 0726)  SpO2: 95 % (11/20/18 0726) Vital Signs (24h Range):  Temp:  [97.3 °F (36.3 °C)-98.2 °F (36.8 °C)] 97.9 °F (36.6 °C)  Pulse:  [73-82] 81  Resp:  [18] 18  SpO2:  [95 %-98 %] 95 %  BP: (120-137)/(65-84) 123/84     Weight: 100.1 kg (220 lb 10.9 oz)  Body mass index is 31.66 kg/m².    Intake/Output Summary (Last 24 hours) at 11/20/2018 1009  Last data filed at 11/20/2018 0700  Gross per 24 hour   Intake 480 ml   Output --   Net 480 ml      Physical Exam   Constitutional: He is oriented to person, place, and time. He appears well-developed and well-nourished. No distress.   HENT:   Head: Normocephalic and atraumatic.   Eyes: Conjunctivae are normal.   Neck: Neck supple.   Cardiovascular: Normal rate.   Pulses:       Femoral pulses are 2+ on the right side, and 2+ on the left side.       Dorsalis pedis pulses are Detected w/ doppler on the right side, and Detected w/ doppler on the left side.        Posterior tibial pulses are Detected w/ doppler on the right side, and Detected w/ doppler on the left side.   Pulmonary/Chest: Effort normal. No  respiratory distress. He exhibits no tenderness.   Abdominal: Soft. He exhibits no distension and no mass. There is no tenderness. There is no rebound and no guarding.   Musculoskeletal: Normal range of motion. He exhibits edema. He exhibits no tenderness or deformity.   Feet:   Right Foot:   Skin Integrity: Negative for ulcer, blister, skin breakdown, callus or dry skin.   Left Foot:   Skin Integrity: Positive for ulcer, blister, skin breakdown, callus and dry skin.   Neurological: He is alert and oriented to person, place, and time. No sensory deficit.   Skin: Skin is warm and dry. Capillary refill takes less than 2 seconds. No rash noted. No erythema. No pallor.   Psychiatric: He has a normal mood and affect.   Vitals reviewed.      Significant Labs:   BMP:   Recent Labs   Lab 11/20/18  0550   *   *   K 4.3      CO2 25   BUN 36*   CREATININE 0.8   CALCIUM 8.4*       Significant Imaging: I have reviewed and interpreted all pertinent imaging results/findings within the past 24 hours.

## 2018-11-20 NOTE — PLAN OF CARE
Problem: Fall Risk (Adult)  Intervention: Safety Promotion/Fall Prevention   11/19/18 9651   Safety Interventions   Safety Promotion/Fall Prevention assistive device/personal item within reach;bed alarm set;diversional activities provided;Fall Risk reviewed with patient/family;lighting adjusted;medications reviewed;nonskid shoes/socks when out of bed;room near unit station;side rails raised x 2;instructed to call staff for mobility

## 2018-11-20 NOTE — PROGRESS NOTES
[facility] Medicine  Progress Note    Patient Name: Marvin Ray  MRN: [id number]  Patient Class: IP- Inpatient   Admission Date: 11/8/2018  Length of Stay: 12 days  Attending Physician: Tree Ortega MD  Primary Care Provider: Rubén Davis MD        Subjective:     Principal Problem:Cellulitis of left lower leg    HPI:  59 yo male with CAD, HLP, hepatosplenomegaly/ascites, DM2 with CKD3 and retinopathy, PVD, gout, chronic combined CHF and chronic BLE wounds L>R presented from Dr. Chan office with concern for nonhealing wound to LLE. He was recently given bactrim and presented today with LAYNE, hyperkalemia and increased pain and edema to LLE. Pt has h/o Group G strep bacteremia (10/10). ICD implanted 11/2.  The patient's sister reports doing dressing changes of LE wounds 5 times a day. Noticeable weeping of fluid from the LE. Pt made NPO by vascular surgery and plan for angiogram and possible stent placement to LE to assist with better LE wound healing.     Hospital Course:  PT admitted with non-healing Left foot wounds. Plan for angiogram  By vascular surgery cancelled due to hyperkalemia/LAYNE.  Pt received kayexalate x 2 and  Potassium persistently high. Add calcium gluconate x one dose and IV lasix. Monitor K levels and plan for possible intervention in next few days if labs improve. Wound care consulted.     11/10- serum potassium elevated, check urine electrolytes; calcium IV given, pt frustrated about being in hospital and not able to ambulate on his own   11/11- persistent hyperkalemia in setting of improving LAYNE; pt received IV lasix, kayexalate x2, calcium gluconate 1500mg; insulin and dextrose today, lasix IV again and albuterol high dose neb x one  11/12- potassium 4.9; nephrology added sodium bicarb; vascular surgery planning intervention with improved labs   S/P angiogram,on 11.13.18 ,1. US guided R CFA access  2. Aortogram   3. LLE angiogram  4. L SFA  angioplasty with a 5 x 40 mm [employer] balloon  5. L AT angioplasty with a 2 x 220 mm [place] balloon  6. L peroneal angioplasty with a 2 x 80 mm [place] balloon  7. Moderate sedation,  S/P wound debridemnemnt,intraoprative cultures grow acinobacter,strep,enterococus,adjyusted IV Abx,DC clindamycin,started on Cipro,continue with Rocephin per sensitivity.  S/P toes 2-5 amputation.  He has hyperkalemia,will gave dose of kayexalate.imroved.  Will have local debridement today.    Interval History: pt has no new complaints     Review of Systems   Constitutional: Negative for chills and fever.   HENT: Negative for congestion.    Eyes: Negative for visual disturbance.   Respiratory: Negative for shortness of breath.    Cardiovascular: Negative for chest pain.   Gastrointestinal: Negative for abdominal pain.   Endocrine: Negative for polyphagia.   Genitourinary: Negative for dysuria.   Musculoskeletal: Negative for back pain.   Skin: Positive for color change and wound. Negative for pallor and rash.   Allergic/Immunologic: Negative for immunocompromised state.   Neurological: Negative for weakness.   Hematological: Does not bruise/bleed easily.   Psychiatric/Behavioral: Negative for confusion.     Objective:     Vital Signs (Most Recent):  Temp: 97.9 °F (36.6 °C) (11/20/18 0726)  Pulse: 81 (11/20/18 0800)  Resp: 18 (11/20/18 0726)  BP: 123/84 (11/20/18 0726)  SpO2: 95 % (11/20/18 0726) Vital Signs (24h Range):  Temp:  [97.3 °F (36.3 °C)-98.2 °F (36.8 °C)] 97.9 °F (36.6 °C)  Pulse:  [73-82] 81  Resp:  [18] 18  SpO2:  [95 %-98 %] 95 %  BP: (120-137)/(65-84) 123/84     Weight: 100.1 kg (220 lb 10.9 oz)  Body mass index is 31.66 kg/m².    Intake/Output Summary (Last 24 hours) at 11/20/2018 1009  Last data filed at 11/20/2018 0700  Gross per 24 hour   Intake 480 ml   Output --   Net 480 ml      Physical Exam   Constitutional: He is oriented to person, place, and time. He appears well-developed and well-nourished. No distress.    HENT:   Head: Normocephalic and atraumatic.   Eyes: Conjunctivae are normal.   Neck: Neck supple.   Cardiovascular: Normal rate.   Pulses:       Femoral pulses are 2+ on the right side, and 2+ on the left side.       Dorsalis pedis pulses are Detected w/ doppler on the right side, and Detected w/ doppler on the left side.        Posterior tibial pulses are Detected w/ doppler on the right side, and Detected w/ doppler on the left side.   Pulmonary/Chest: Effort normal. No respiratory distress. He exhibits no tenderness.   Abdominal: Soft. He exhibits no distension and no mass. There is no tenderness. There is no rebound and no guarding.   Musculoskeletal: Normal range of motion. He exhibits edema. He exhibits no tenderness or deformity.   Feet:   Right Foot:   Skin Integrity: Negative for ulcer, blister, skin breakdown, callus or dry skin.   Left Foot:   Skin Integrity: Positive for ulcer, blister, skin breakdown, callus and dry skin.   Neurological: He is alert and oriented to person, place, and time. No sensory deficit.   Skin: Skin is warm and dry. Capillary refill takes less than 2 seconds. No rash noted. No erythema. No pallor.   Psychiatric: He has a normal mood and affect.   Vitals reviewed.      Significant Labs:   BMP:   Recent Labs   Lab 11/20/18  0550   *   *   K 4.3      CO2 25   BUN 36*   CREATININE 0.8   CALCIUM 8.4*       Significant Imaging: I have reviewed and interpreted all pertinent imaging results/findings within the past 24 hours.    Assessment/Plan:      Cellulitis of left lower extremity    See PVD.       Hyperkalemia    He has hyperkalemia,will gave dose of kayexalate.       Acute renal failure superimposed on stage 3 chronic kidney disease    Pt has reported baseline CKD3  Recently placed on bactrim in addition to acei and aldactone/lasix - hold offending agents  Gentle IVF  BMP in am  Renal dose antibiotics  Avoid nephrotoxins        Open wound of left foot    Wound  care consulted  See above        Chronic combined systolic and diastolic heart failure    EF 20%,No acute issues  Hold aldactone and acei insetting of hyperkalemia and LAYNE   Resume BB  S/p ICD 11/2       Stage 3 chronic kidney disease    Will be monitored.       Hepatosplenomegaly    Recent paracentesis - negative culture   Monitor CMP             PVD (peripheral vascular disease)        Vascular surgery consulted  Pain control     S/P angiogram,on 11.13.18 ,1. US guided R CFA access  2. Aortogram   3. LLE angiogram  4. L SFA angioplasty with a 5 x 40 mm [place] balloon  5. L AT angioplasty with a 2 x 220 mm [place] balloon  6. L peroneal angioplasty with a 2 x 80 mm [place] balloon  7. Moderate sedation,  S/P wound debridemnemnt,intraoprative cultures grow acinobacter,strep,enterococus,adjyusted IV Abx,DC clindamycin,started on Cipro,continue with Rocephin per sensitivity.  S/P toes 2-5 left amputation.grow acinobacter,strep,enterococus.  Will have local debridement today.     CAD (coronary artery disease)    No acute issues  Resume aspirin and zetia  Statin intolerance         Essential hypertension      Controlled  Resume amlodipine and BB  Hold aldactone and acei as stated above      DM type 2 with diabetic peripheral neuropathy    Uncontrolled, A1c 8.3%  Basal -bolus insulin   Diabetic diet             VTE Risk Mitigation (From admission, onward)        Ordered     enoxaparin injection 40 mg  Daily      11/08/18 1501     IP VTE HIGH RISK PATIENT  Once      11/08/18 1501              Tree Ortega MD  Department of Hospital Medicine   [facility]

## 2018-11-20 NOTE — PROGRESS NOTES
Plastic Surgery Progress Note    Wound was evaluated during dressing change.    There is a large round of the dorsum of the foot extending to involve most of the medial aspect as well. The second-5th toes are amputated. There is necrosis of the big toe. The wound is 100% covered in slough.     Had a lengthy discussion with the patient and his family. At this point there is still significant amount of healing that would need to occur prior to being able to heal a skin graft. Both him and his family is adamant that they want to try everything before and amputation.     He will be debrided today by vascular surgery and then likely discharged with outpatient wound care.     He can follow up in our clinic if he wishes for us to follow him, but at this point there is nothing we can offer to aid in wound coverage until all of the necrotic tissue is gone and there is a healthy wound bed of granulation tissue that will heal a skin graft.

## 2018-11-20 NOTE — PLAN OF CARE
Problem: Diabetes, Type 2 (Adult)  Goal: Signs and Symptoms of Listed Potential Problems Will be Absent, Minimized or Managed (Diabetes, Type 2)  Signs and symptoms of listed potential problems will be absent, minimized or managed by discharge/transition of care (reference Diabetes, Type 2 (Adult) CPG).  Outcome: Ongoing (interventions implemented as appropriate)   11/20/18 1453   Diabetes, Type 2   Problems Assessed (Type 2 Diabetes) all   Problems Present (Type 2 Diabetes) none       Problem: Fall Risk (Adult)  Goal: Absence of Falls  Patient will demonstrate the desired outcomes by discharge/transition of care.  Outcome: Ongoing (interventions implemented as appropriate)   11/20/18 1453   Fall Risk (Adult)   Absence of Falls making progress toward outcome       Problem: Patient Care Overview  Goal: Plan of Care Review  Outcome: Ongoing (interventions implemented as appropriate)   11/20/18 1453   Coping/Psychosocial   Plan Of Care Reviewed With patient       Problem: Pressure Ulcer Risk (Arash Scale) (Adult,Obstetrics,Pediatric)  Goal: Skin Integrity  Patient will demonstrate the desired outcomes by discharge/transition of care.  Outcome: Ongoing (interventions implemented as appropriate)   11/20/18 1453   Pressure Ulcer Risk (Arash Scale) (Adult,Obstetrics,Pediatric)   Skin Integrity making progress toward outcome       Problem: Skin Integrity Impairment, Risk/Actual (Adult)  Goal: Skin Integrity/Wound Healing  Patient will demonstrate the desired outcomes by discharge/transition of care.  Outcome: Ongoing (interventions implemented as appropriate)   11/20/18 1453   Skin Integrity Impairment, Risk/Actual (Adult)   Skin Integrity/Wound Healing making progress toward outcome

## 2018-11-20 NOTE — PLAN OF CARE
Problem: Diabetes, Type 2 (Adult)  Intervention: Optimize Glycemic Control   11/19/18 1945   Nutrition Interventions   Glycemic Management blood glucose monitoring

## 2018-11-20 NOTE — ASSESSMENT & PLAN NOTE
-L foot wound multifactorial likely due to DM, PVD and venous insufficiency s/p LLE angiography with SFA, AT and peroneal angioplasty s/p L foot washout/debridement 11/14/18 s/p toe 2-5 amputations and foot debridement 11/16/18 - wound minimally improved after debridement, remains extensive; infection cleared.    -Ortho and Plastics have evaluated wound without further interventions recommended at this time - I will continue debridements, wound care recs as pt and family desire to not have further amputation at this time; risks and benefits of continued wound care vs BKA d/w with pt and family who state understanding and desire to continue local wound care and have continued eval by [facility] Care Center and Podiatry  -Nutrition optimization  -Cont wound care consult recs  -Cont offloading  -Abx per primary team  -Strict glycemic control  -I will arrange close f/u with pt for wound recheck and have him see W[facility] Care Center for comprehensive wound care with Plastic surgery and HBO eval

## 2018-11-20 NOTE — SUBJECTIVE & OBJECTIVE
Medications:  Continuous Infusions:  Scheduled Meds:   amLODIPine  10 mg Oral Daily    aspirin  81 mg Oral Daily    brimonidine 0.1%  1 drop Both Eyes TID    carvedilol  6.25 mg Oral BID    cefTRIAXone (ROCEPHIN) IVPB  1 g Intravenous Q12H    ciprofloxacin  400 mg Intravenous Q12H    colchicine  0.3 mg Oral Daily    collagenase   Topical (Top) Daily    dorzolamide-timolol 2-0.5%  1 drop Both Eyes BID    enoxaparin  40 mg Subcutaneous Daily    ezetimibe  10 mg Oral Daily    insulin aspart U-100  5 Units Subcutaneous TIDWM    prednisoLONE acetate  1 drop Right Eye TID    senna-docusate 8.6-50 mg  1 tablet Oral BID     PRN Meds:acetaminophen, dextrose 50%, dextrose 50%, glucagon (human recombinant), glucose, glucose, HYDROmorphone, influenza, insulin aspart U-100, metoclopramide HCl, morphine, oxyCODONE-acetaminophen, oxyCODONE-acetaminophen, sodium chloride 0.9%, sodium chloride 0.9%, sodium chloride 0.9%     Objective:     Vital Signs (Most Recent):  Temp: 97.9 °F (36.6 °C) (11/20/18 0726)  Pulse: 82 (11/20/18 0726)  Resp: 18 (11/20/18 0726)  BP: 123/84 (11/20/18 0726)  SpO2: 95 % (11/20/18 0726) Vital Signs (24h Range):  Temp:  [97.3 °F (36.3 °C)-98.2 °F (36.8 °C)] 97.9 °F (36.6 °C)  Pulse:  [73-82] 82  Resp:  [18] 18  SpO2:  [95 %-98 %] 95 %  BP: (120-137)/(65-84) 123/84     Date 11/20/18 0700 - 11/21/18 0659   Shift 0457-5145 4003-4292 7304-5093 24 Hour Total   INTAKE   P.O. 240   240   Shift Total(mL/kg) 240(2.4)   240(2.4)   OUTPUT   Shift Total(mL/kg)       Weight (kg) 100.1 100.1 100.1 100.1       Physical Exam   Constitutional: He is oriented to person, place, and time. He appears well-developed and well-nourished. No distress.   HENT:   Head: Normocephalic and atraumatic.   Eyes: Conjunctivae are normal.   Neck: Neck supple.   Cardiovascular: Normal rate.   Pulses:       Femoral pulses are 2+ on the right side, and 2+ on the left side.       Dorsalis pedis pulses are Detected w/ doppler on  the right side, and Detected w/ doppler on the left side.        Posterior tibial pulses are Detected w/ doppler on the right side, and Detected w/ doppler on the left side.   Pulmonary/Chest: Effort normal. No respiratory distress. He exhibits no tenderness.   Abdominal: Soft. He exhibits no distension and no mass. There is no tenderness. There is no rebound and no guarding.   Musculoskeletal: Normal range of motion. He exhibits no edema, tenderness or deformity.   Feet:   Right Foot:   Skin Integrity: Negative for ulcer, blister, skin breakdown, callus or dry skin.   Left Foot:   Skin Integrity: Positive for ulcer, blister, skin breakdown, callus and dry skin.   Neurological: He is alert and oriented to person, place, and time. No sensory deficit.   Skin: Skin is warm and dry. Capillary refill takes less than 2 seconds. No rash noted. No erythema. No pallor.        Psychiatric: He has a normal mood and affect.   Vitals reviewed.      Significant Labs:  All pertinent labs from the last 24 hours have been reviewed.    Significant Diagnostics:  I have reviewed all pertinent imaging results/findings within the past 24 hours.

## 2018-11-21 ENCOUNTER — TELEPHONE (OUTPATIENT)
Dept: VASCULAR SURGERY | Facility: CLINIC | Age: 58
End: 2018-11-21

## 2018-11-21 VITALS
SYSTOLIC BLOOD PRESSURE: 137 MMHG | HEIGHT: 70 IN | DIASTOLIC BLOOD PRESSURE: 82 MMHG | TEMPERATURE: 98 F | BODY MASS INDEX: 35.13 KG/M2 | WEIGHT: 245.38 LBS | RESPIRATION RATE: 18 BRPM | HEART RATE: 83 BPM | OXYGEN SATURATION: 91 %

## 2018-11-21 LAB
ALBUMIN SERPL BCP-MCNC: 2.1 G/DL
ALP SERPL-CCNC: 178 U/L
ALT SERPL W/O P-5'-P-CCNC: 20 U/L
ANION GAP SERPL CALC-SCNC: 7 MMOL/L
AST SERPL-CCNC: 17 U/L
BILIRUB SERPL-MCNC: 0.5 MG/DL
BUN SERPL-MCNC: 35 MG/DL
CALCIUM SERPL-MCNC: 8.3 MG/DL
CHLORIDE SERPL-SCNC: 104 MMOL/L
CO2 SERPL-SCNC: 22 MMOL/L
CREAT SERPL-MCNC: 0.9 MG/DL
EST. GFR  (AFRICAN AMERICAN): >60 ML/MIN/1.73 M^2
EST. GFR  (NON AFRICAN AMERICAN): >60 ML/MIN/1.73 M^2
GLUCOSE SERPL-MCNC: 155 MG/DL
POCT GLUCOSE: 176 MG/DL (ref 70–110)
POCT GLUCOSE: 203 MG/DL (ref 70–110)
POTASSIUM SERPL-SCNC: 4.3 MMOL/L
PROT SERPL-MCNC: 7.1 G/DL
SODIUM SERPL-SCNC: 133 MMOL/L

## 2018-11-21 PROCEDURE — 25000003 PHARM REV CODE 250: Performed by: INTERNAL MEDICINE

## 2018-11-21 PROCEDURE — 25000003 PHARM REV CODE 250: Performed by: EMERGENCY MEDICINE

## 2018-11-21 PROCEDURE — 25000003 PHARM REV CODE 250: Performed by: HOSPITALIST

## 2018-11-21 PROCEDURE — 80053 COMPREHEN METABOLIC PANEL: CPT

## 2018-11-21 PROCEDURE — 63600175 PHARM REV CODE 636 W HCPCS: Performed by: EMERGENCY MEDICINE

## 2018-11-21 PROCEDURE — 99024 POSTOP FOLLOW-UP VISIT: CPT | Mod: ,,, | Performed by: SURGERY

## 2018-11-21 PROCEDURE — 36415 COLL VENOUS BLD VENIPUNCTURE: CPT

## 2018-11-21 PROCEDURE — 90686 IIV4 VACC NO PRSV 0.5 ML IM: CPT | Performed by: EMERGENCY MEDICINE

## 2018-11-21 PROCEDURE — 90471 IMMUNIZATION ADMIN: CPT | Performed by: EMERGENCY MEDICINE

## 2018-11-21 PROCEDURE — 25000003 PHARM REV CODE 250: Performed by: SURGERY

## 2018-11-21 RX ORDER — AMLODIPINE BESYLATE 5 MG/1
TABLET ORAL
Qty: 30 TABLET | Refills: 0
Start: 2018-11-21 | End: 2018-11-23 | Stop reason: SDUPTHER

## 2018-11-21 RX ORDER — CIPROFLOXACIN 500 MG/1
500 TABLET ORAL EVERY 12 HOURS
Qty: 28 TABLET | Refills: 0 | Status: SHIPPED | OUTPATIENT
Start: 2018-11-21 | End: 2018-12-05

## 2018-11-21 RX ORDER — FUROSEMIDE 40 MG/1
40 TABLET ORAL 2 TIMES DAILY
Qty: 60 TABLET | Refills: 0 | Status: ON HOLD | OUTPATIENT
Start: 2018-11-21 | End: 2018-12-11 | Stop reason: HOSPADM

## 2018-11-21 RX ORDER — DOXYCYCLINE 100 MG/1
100 CAPSULE ORAL EVERY 12 HOURS
Qty: 28 CAPSULE | Refills: 0 | Status: SHIPPED | OUTPATIENT
Start: 2018-11-21 | End: 2018-12-05

## 2018-11-21 RX ADMIN — DORZOLAMIDE HYDROCHLORIDE AND TIMOLOL MALEATE 1 DROP: 20; 5 SOLUTION/ DROPS OPHTHALMIC at 08:11

## 2018-11-21 RX ADMIN — OXYCODONE HYDROCHLORIDE AND ACETAMINOPHEN 1 TABLET: 7.5; 325 TABLET ORAL at 06:11

## 2018-11-21 RX ADMIN — ASPIRIN 81 MG 81 MG: 81 TABLET ORAL at 08:11

## 2018-11-21 RX ADMIN — BRIMONIDINE TARTRATE 1 DROP: 1 SOLUTION/ DROPS OPHTHALMIC at 08:11

## 2018-11-21 RX ADMIN — INSULIN ASPART 5 UNITS: 100 INJECTION, SOLUTION INTRAVENOUS; SUBCUTANEOUS at 12:11

## 2018-11-21 RX ADMIN — INSULIN ASPART 5 UNITS: 100 INJECTION, SOLUTION INTRAVENOUS; SUBCUTANEOUS at 08:11

## 2018-11-21 RX ADMIN — EZETIMIBE 10 MG: 10 TABLET ORAL at 08:11

## 2018-11-21 RX ADMIN — COLCHICINE 0.3 MG: 0.6 TABLET, FILM COATED ORAL at 08:11

## 2018-11-21 RX ADMIN — COLLAGENASE SANTYL: 250 OINTMENT TOPICAL at 08:11

## 2018-11-21 RX ADMIN — CEFTRIAXONE 1 G: 1 INJECTION, SOLUTION INTRAVENOUS at 01:11

## 2018-11-21 RX ADMIN — AMLODIPINE BESYLATE 10 MG: 5 TABLET ORAL at 08:11

## 2018-11-21 RX ADMIN — INFLUENZA A VIRUS A/MICHIGAN/45/2015 X-275 (H1N1) ANTIGEN (FORMALDEHYDE INACTIVATED), INFLUENZA A VIRUS A/SINGAPORE/INFIMH-16-0019/2016 IVR-186 (H3N2) ANTIGEN (FORMALDEHYDE INACTIVATED), INFLUENZA B VIRUS B/PHUKET/3073/2013 ANTIGEN (FORMALDEHYDE INACTIVATED), AND INFLUENZA B VIRUS B/MARYLAND/15/2016 BX-69A ANTIGEN (FORMALDEHYDE INACTIVATED) 0.5 ML: 15; 15; 15; 15 INJECTION, SUSPENSION INTRAMUSCULAR at 12:11

## 2018-11-21 RX ADMIN — CARVEDILOL 6.25 MG: 6.25 TABLET, FILM COATED ORAL at 08:11

## 2018-11-21 RX ADMIN — PREDNISOLONE ACETATE 1 DROP: 10 SUSPENSION OPHTHALMIC at 08:11

## 2018-11-21 RX ADMIN — STANDARDIZED SENNA CONCENTRATE AND DOCUSATE SODIUM 1 TABLET: 8.6; 5 TABLET, FILM COATED ORAL at 08:11

## 2018-11-21 RX ADMIN — INSULIN ASPART 2 UNITS: 100 INJECTION, SOLUTION INTRAVENOUS; SUBCUTANEOUS at 12:11

## 2018-11-21 NOTE — PROGRESS NOTES
Patient discharged home with plans for continued wound care to left foot. Patient to have Jignesh ADAIR to follow and appointment for follow up in outpatient [facility] on Tuesday 11/27.   To continue local wound care with Santyl to yellow slough and hydrofiber Ag to amputation site. Sister instructed on dressing changes daily with enzymatic debriding agent.   [facility] requesting wound dimensions for dispensing Santyl. Dressing changed this am with Santyl. Wound encompasses dorsum, medial, and lateral foot. Wound dimensions provided as dorsal, medial and lateral foot wounds. Wounds level with epidermis.   Left heel wound- 3 cm(L) 3 cm(W)  Medial foot wound- 8 cm(L) 13 cm(W)  Dorsal foot wound- 6 cm(L) 12 cm(W)  Lateral foot wound- 3 cm(L) 7 cm(W)   Provided TN with measurements. Photodocumentation of wounds 11/19/18.   Encouraged edema control and BG management. Patient reports feeling much better.

## 2018-11-21 NOTE — PROGRESS NOTES
[facility]  Vascular Surgery  Progress Note    Patient Name: Marvin Ray  MRN: [id number]  Admission Date: 11/8/2018  Primary Care Provider: Rubén Davis MD    Subjective:     Interval History: No complaints today.    Post-Op Info:  Procedure(s) (LRB):  INCISION AND DRAINAGE (Left)  AMPUTATION, TOES  2-5 (Left)   5 Days Post-Op       Medications:  Continuous Infusions:  Scheduled Meds:   amLODIPine  10 mg Oral Daily    aspirin  81 mg Oral Daily    brimonidine 0.1%  1 drop Both Eyes TID    carvedilol  6.25 mg Oral BID    cefTRIAXone (ROCEPHIN) IVPB  1 g Intravenous Q12H    ciprofloxacin  400 mg Intravenous Q12H    colchicine  0.3 mg Oral Daily    collagenase   Topical (Top) Daily    dorzolamide-timolol 2-0.5%  1 drop Both Eyes BID    enoxaparin  40 mg Subcutaneous Daily    ezetimibe  10 mg Oral Daily    insulin aspart U-100  5 Units Subcutaneous TIDWM    prednisoLONE acetate  1 drop Right Eye TID    senna-docusate 8.6-50 mg  1 tablet Oral BID     PRN Meds:acetaminophen, dextrose 50%, dextrose 50%, glucagon (human recombinant), glucose, glucose, HYDROmorphone, influenza, insulin aspart U-100, metoclopramide HCl, morphine, oxyCODONE-acetaminophen, oxyCODONE-acetaminophen, sodium chloride 0.9%     Objective:     Vital Signs (Most Recent):  Temp: 97.7 °F (36.5 °C) (11/21/18 1120)  Pulse: 83 (11/21/18 1120)  Resp: 18 (11/21/18 1120)  BP: 137/82 (11/21/18 1120)  SpO2: (!) 91 % (11/21/18 1120) Vital Signs (24h Range):  Temp:  [96.3 °F (35.7 °C)-97.7 °F (36.5 °C)] 97.7 °F (36.5 °C)  Pulse:  [78-89] 83  Resp:  [18-20] 18  SpO2:  [91 %-97 %] 91 %  BP: (115-145)/(76-84) 137/82     Date 11/21/18 0700 - 11/22/18 0659   Shift 9688-2825 [id number] [id number] 24 Hour Total   INTAKE   P.O. 240   240   Shift Total(mL/kg) 240(2.2)   240(2.2)   OUTPUT   Shift Total(mL/kg)       Weight (kg) 111.3 111.3 111.3 111.3       Physical Exam   Constitutional: He is oriented to person, place, and time. He  appears well-developed and well-nourished. No distress.   HENT:   Head: Normocephalic and atraumatic.   Eyes: Conjunctivae are normal.   Neck: Neck supple.   Cardiovascular: Normal rate.   Pulses:       Femoral pulses are 2+ on the right side, and 2+ on the left side.       Dorsalis pedis pulses are Detected w/ doppler on the right side, and Detected w/ doppler on the left side.        Posterior tibial pulses are Detected w/ doppler on the right side, and Detected w/ doppler on the left side.   Pulmonary/Chest: Effort normal. No respiratory distress. He exhibits no tenderness.   Abdominal: Soft. He exhibits no distension and no mass. There is no tenderness. There is no rebound and no guarding.   Musculoskeletal: Normal range of motion. He exhibits no edema, tenderness or deformity.   Feet:   Right Foot:   Skin Integrity: Negative for ulcer, blister, skin breakdown, callus or dry skin.   Left Foot:   Skin Integrity: Positive for ulcer, blister, skin breakdown, callus and dry skin.   Neurological: He is alert and oriented to person, place, and time. No sensory deficit.   Skin: Skin is warm and dry. Capillary refill takes less than 2 seconds. No rash noted. No erythema. No pallor.        Psychiatric: He has a normal mood and affect.   Vitals reviewed.      Significant Labs:  All pertinent labs from the last 24 hours have been reviewed.    Significant Diagnostics:  I have reviewed all pertinent imaging results/findings within the past 24 hours.    Assessment/Plan:     Open wound of left foot    -L foot wound multifactorial likely due to DM, PVD and venous insufficiency s/p LLE angiography with SFA, AT and peroneal angioplasty s/p L foot washout/debridement 11/14/18 s/p toe 2-5 amputations and foot debridement 11/16/18 - wound improving after debridement, remains extensive; infection cleared    -Ortho and Plastics have evaluated wound without further interventions recommended at this time - I will continue debridements,  wound care recs as pt and family desire to not have further amputation at this time; risks and benefits of continued wound care vs BKA d/w with pt and family who state understanding and desire to continue local wound care and have continued eval by [facility]ound Care Center and Podiatry; f/u with me and wound care center 11/26/18  -Nutrition optimization  -Cont wound care consult recs  -Cont offloading  -Abx per primary team  -Strict glycemic control  -I will arrange close f/u with pt for wound recheck and have him see Wound Care Center for comprehensive wound care with Plastic surgery and HBO eval         Mitch Chan MD  Vascular Surgery  [facility] [place]

## 2018-11-21 NOTE — PLAN OF CARE
Problem: Wound, Traumatic, Nonburn (Adult)  Intervention: Prevent/Manage Infection   11/21/18 0222   Safety Interventions   Infection Prevention rest/sleep promoted;personal protective equipment utilized;hydration promoted     Intervention: Provide Gentle, Aseptic Wound Care   11/21/18 0222   Skin Interventions   Skin Protection Other (see comments)  (surgical dressing)     Intervention: Promote Effective Wound Healing   11/21/18 0222   Nutrition Interventions   Oral Nutrition Promotion rest periods promoted;safe use of adaptive equipment encouraged   Pain/Comfort/Sleep Interventions   Sleep/Rest Enhancement awakenings minimized;noise level reduced      11/21/18 0222   Nutrition Interventions   Oral Nutrition Promotion rest periods promoted;safe use of adaptive equipment encouraged   Pain/Comfort/Sleep Interventions   Sleep/Rest Enhancement awakenings minimized;noise level reduced       Goal: Signs and Symptoms of Listed Potential Problems Will be Absent, Minimized or Managed (Wound, Traumatic, Nonburn)  Signs and symptoms of listed potential problems will be absent, minimized or managed by discharge/transition of care (reference Wound, Traumatic, Nonburn (Adult) CPG).  Outcome: Ongoing (interventions implemented as appropriate)   11/21/18 0222   Wound, Traumatic, Nonburn   Problems Assessed (Wound) all   Problems Present (Wound) pain;infection

## 2018-11-21 NOTE — SUBJECTIVE & OBJECTIVE
Medications:  Continuous Infusions:  Scheduled Meds:   amLODIPine  10 mg Oral Daily    aspirin  81 mg Oral Daily    brimonidine 0.1%  1 drop Both Eyes TID    carvedilol  6.25 mg Oral BID    cefTRIAXone (ROCEPHIN) IVPB  1 g Intravenous Q12H    ciprofloxacin  400 mg Intravenous Q12H    colchicine  0.3 mg Oral Daily    collagenase   Topical (Top) Daily    dorzolamide-timolol 2-0.5%  1 drop Both Eyes BID    enoxaparin  40 mg Subcutaneous Daily    ezetimibe  10 mg Oral Daily    insulin aspart U-100  5 Units Subcutaneous TIDWM    prednisoLONE acetate  1 drop Right Eye TID    senna-docusate 8.6-50 mg  1 tablet Oral BID     PRN Meds:acetaminophen, dextrose 50%, dextrose 50%, glucagon (human recombinant), glucose, glucose, HYDROmorphone, influenza, insulin aspart U-100, metoclopramide HCl, morphine, oxyCODONE-acetaminophen, oxyCODONE-acetaminophen, sodium chloride 0.9%     Objective:     Vital Signs (Most Recent):  Temp: 97.7 °F (36.5 °C) (11/21/18 1120)  Pulse: 83 (11/21/18 1120)  Resp: 18 (11/21/18 1120)  BP: 137/82 (11/21/18 1120)  SpO2: (!) 91 % (11/21/18 1120) Vital Signs (24h Range):  Temp:  [96.3 °F (35.7 °C)-97.7 °F (36.5 °C)] 97.7 °F (36.5 °C)  Pulse:  [78-89] 83  Resp:  [18-20] 18  SpO2:  [91 %-97 %] 91 %  BP: (115-145)/(76-84) 137/82     Date 11/21/18 0700 - 11/22/18 0659   Shift 2689-6986 8827-8796 0823-6164 24 Hour Total   INTAKE   P.O. 240   240   Shift Total(mL/kg) 240(2.2)   240(2.2)   OUTPUT   Shift Total(mL/kg)       Weight (kg) 111.3 111.3 111.3 111.3       Physical Exam   Constitutional: He is oriented to person, place, and time. He appears well-developed and well-nourished. No distress.   HENT:   Head: Normocephalic and atraumatic.   Eyes: Conjunctivae are normal.   Neck: Neck supple.   Cardiovascular: Normal rate.   Pulses:       Femoral pulses are 2+ on the right side, and 2+ on the left side.       Dorsalis pedis pulses are Detected w/ doppler on the right side, and Detected w/ doppler  on the left side.        Posterior tibial pulses are Detected w/ doppler on the right side, and Detected w/ doppler on the left side.   Pulmonary/Chest: Effort normal. No respiratory distress. He exhibits no tenderness.   Abdominal: Soft. He exhibits no distension and no mass. There is no tenderness. There is no rebound and no guarding.   Musculoskeletal: Normal range of motion. He exhibits no edema, tenderness or deformity.   Feet:   Right Foot:   Skin Integrity: Negative for ulcer, blister, skin breakdown, callus or dry skin.   Left Foot:   Skin Integrity: Positive for ulcer, blister, skin breakdown, callus and dry skin.   Neurological: He is alert and oriented to person, place, and time. No sensory deficit.   Skin: Skin is warm and dry. Capillary refill takes less than 2 seconds. No rash noted. No erythema. No pallor.        Psychiatric: He has a normal mood and affect.   Vitals reviewed.      Significant Labs:  All pertinent labs from the last 24 hours have been reviewed.    Significant Diagnostics:  I have reviewed all pertinent imaging results/findings within the past 24 hours.

## 2018-11-21 NOTE — PLAN OF CARE
[facility]    HOME HEALTH ORDERS  FACE TO FACE ENCOUNTER    Patient Name: Marvin Ray  YOB: 1960    PCP: Rubén Davis MD   PCP Address: [address] [place] [address] TRAM / JONELLE [id number]  PCP Phone Number: [phone]  PCP Fax: [phone]    Encounter Date: 11/21/2018    Admit to Home Health    Diagnoses:  Active Hospital Problems    Diagnosis  POA    Cellulitis of left lower extremity [L03.116]  Yes    Hyperkalemia [E87.5]  Yes    Open wound of left foot [S91.302A]  Yes    Acute renal failure superimposed on stage 3 chronic kidney disease [N17.9, N18.3]  Yes    Chronic combined systolic and diastolic heart failure [I50.42]  Yes     Chronic    Hepatosplenomegaly [R16.2]  Yes    Stage 3 chronic kidney disease [N18.3]  Yes     Chronic    PVD (peripheral vascular disease) [I73.9]  Yes    CAD (coronary artery disease) [I25.10]  Yes     Chronic    Essential hypertension [I10]  Yes     Chronic    DM type 2 with diabetic peripheral neuropathy [E11.42]  Yes     Chronic      Resolved Hospital Problems    Diagnosis Date Resolved POA    *Cellulitis of left lower leg [L03.116] 11/16/2018 Yes    Metabolic acidosis [E87.2] 11/16/2018 Yes    Hyperkalemia [E87.5] 11/13/2018 Yes       Future Appointments   Date Time Provider Department Center   11/26/2018  9:10 AM LISA Jackman MD [facility] [address]   11/27/2018  1:10 PM Rubén Davis MD [facility] WOUND [facility]   11/29/2018  9:00 AM Perla Cortés MD [facility] OPHTHAL [address]   11/30/2018  9:40 AM Rubén Davis MD [facility] FM IM [facility] - B   12/13/2018  3:00 PM Fannie Samano DO [facility] NEURO [facility]   12/13/2018  4:00 PM Mitch Chan MD [facility] VAS JAYMIE [facility]   1/7/2019 10:00 AM LAB, [facility] DRAW STATION [facility] LAB [facility]   1/11/2019 11:00 AM Sheryl Madison NP [facility] ENDO [facility] [place] [facility]     Follow-up Information     [facility]  On 11/27/2018.    Why:  On Tuesday @ 1:10pm, outpatient services  Please go to 1st floor Patient Registration            Rubén Davis MD On 11/30/2018.    Specialty:  Family Medicine  Why:  On Friday @ 9:40am, outpatient services  Contact information:  [address] [id number]  [phone]             Merle Chou DPM On 11/28/2018.    Specialty:  Podiatry  Why:  Podiatry Appt on Wednesday @ 9:30am, outpatient services  Contact information:  [address] [place] [address]  [address]  [place] [address]  [phone]             Mitch Chan MD In 1 week.    Specialties:  Cardiology, Vascular Surgery, Surgery  Why:  On Thursday @ 4:00pm, outpatient services  Contact information:  [address]  [address]  [place] [id number]  [phone]             Rubén Davis MD In 1 week.    Specialty:  Family Medicine  Contact information:  [address] [facility] [address]  [phone]                     I have seen and examined this patient face to face today. My clinical findings that support the need for the home health skilled services and home bound status are the following:  Weakness/numbness causing balance and gait disturbance due to Heart Failure, Infection and Weakness/Debility making it taxing to leave home.    Allergies:  Review of patient's allergies indicates:   Allergen Reactions    Statins-hmg-coa reductase inhibitors      Generalized Pain    Onglyza [saxagliptin]     Penicillins Rash       Diet: diabetic diet: 2000 calorie and 2 gram sodium diet    Activities: activity as tolerated    Nursing:   SN to complete comprehensive assessment including routine vital signs. Instruct on disease process and s/s of complications to report to MD. Review/verify medication list sent home with the patient at time of discharge  and instruct patient/caregiver as needed. Frequency may be adjusted depending on start of care date.    Notify MD if SBP > 160 or < 90; DBP > 90 or < 50; HR > 120 or < 50; Temp > 101; Other:         CONSULTS:    Physical Therapy to evaluate and  "treat. Evaluate for home safety and equipment needs; Establish/upgrade home exercise program. Perform / instruct on therapeutic exercises, gait training, transfer training, and Range of Motion.  Occupational Therapy to evaluate and treat. Evaluate home environment for safety and equipment needs. Perform/Instruct on transfers, ADL training, ROM, and therapeutic exercises.    MISCELLANEOUS CARE:  Routine Skin for Bedridden Patients: Instruct patient/caregiver to apply moisture barrier cream to all skin folds and wet areas in perineal area daily and after baths and all bowel movements.    WOUND CARE ORDERS    Local wound care to left foot wounds daily and prn saturated with drainage- Cleanse wounds with NS. Fill amputation site with 1 Aquacel Extra dressing folded to fit into amputation site. Apply 1/8" layer of Santyl to yellow tissue on dorsum, medial and lateral foot. Apply NS moistened gauze on top of Santyl. Cover with dry 4x4 gauze and wrap with 2 Kerlix rolls. Keep foot suspended off bed with Sundance boot.    Medications: Review discharge medications with patient and family and provide education.      Current Discharge Medication List      START taking these medications    Details   ciprofloxacin HCl (CIPRO) 500 MG tablet Take 1 tablet (500 mg total) by mouth every 12 (twelve) hours. for 14 days  Qty: 28 tablet, Refills: 0      collagenase (SANTYL) ointment Apply topically once daily.  Qty: 14 g, Refills: 0    Comments: Please for one month      doxycycline (MONODOX) 100 MG capsule Take 1 capsule (100 mg total) by mouth every 12 (twelve) hours. for 14 days  Qty: 28 capsule, Refills: 0         CONTINUE these medications which have CHANGED    Details   !! amLODIPine (NORVASC) 5 MG tablet TAKE 1 TABLET(5 MG) BY MOUTH EVERY DAY  Qty: 30 tablet, Refills: 0    Associated Diagnoses: Essential hypertension      furosemide (LASIX) 40 MG tablet Take 1 tablet (40 mg total) by mouth 2 (two) times daily.  Qty: 60 tablet, " Refills: 0       !! - Potential duplicate medications found. Please discuss with provider.      CONTINUE these medications which have NOT CHANGED    Details   allopurinol (ZYLOPRIM) 100 MG tablet Take 2 tablets (200 mg total) by mouth once daily.  Qty: 180 tablet, Refills: 3    Associated Diagnoses: Tophaceous gout      !! amLODIPine (NORVASC) 5 MG tablet TAKE 1 TABLET(5 MG) BY MOUTH EVERY DAY  Qty: 30 tablet, Refills: 0    Associated Diagnoses: Essential hypertension      aspirin 81 MG Chew Take 81 mg by mouth once daily.      benazepril (LOTENSIN) 40 MG tablet TAKE 1 TABLET(40 MG) BY MOUTH TWICE DAILY  Qty: 60 tablet, Refills: 0    Comments: No further refills without appt.  Associated Diagnoses: Essential hypertension      brimonidine 0.1% (ALPHAGAN P) 0.1 % Drop Place 1 drop into both eyes 3 (three) times daily.  Qty: 15 mL, Refills: 12    Associated Diagnoses: Neovascular glaucoma of left eye, severe stage; Primary open angle glaucoma of right eye, moderate stage      carvedilol (COREG) 6.25 MG tablet Take 1 tablet (6.25 mg total) by mouth 2 (two) times daily.  Qty: 60 tablet, Refills: 11      coenzyme Q10 100 mg capsule Take 100 mg by mouth every morning.      dorzolamide-timolol 2-0.5% (COSOPT) 22.3-6.8 mg/mL ophthalmic solution Place 1 drop into both eyes 3 (three) times daily.  Qty: 10 mL, Refills: 12    Associated Diagnoses: Neovascular glaucoma of left eye, severe stage; Primary open angle glaucoma of right eye, moderate stage      ezetimibe (ZETIA) 10 mg tablet Take 1 tablet (10 mg total) by mouth once daily.  Qty: 30 tablet, Refills: 2    Associated Diagnoses: Hyperlipidemia LDL goal <70      fish oil-omega-3 fatty acids 300-1,000 mg capsule Take 2 capsules (2 g total) by mouth 2 (two) times daily.  Qty: 120 capsule, Refills: 5    Associated Diagnoses: HLD (hyperlipidemia)      insulin glargine-lixisenatide (SOLIQUA 100/33) 100 unit-33 mcg/mL InPn Inject 34 units once daily  Qty: 5 Syringe, Refills: 2  "     ketorolac 0.5% (ACULAR) 0.5 % Drop Place 1 drop into the right eye 3 (three) times daily.  Qty: 1 Bottle, Refills: 2    Associated Diagnoses: Postoperative care for cataract      MULTIVIT,THER IRON,CA,FA & MIN (MULTIVITAMIN) Tab Take 1 tablet by mouth every morning.       pen needle, diabetic 31 gauge x 1/4" Ndle Use as directed  Qty: 100 each, Refills: 11    Associated Diagnoses: DM type 2 with diabetic peripheral neuropathy      prednisoLONE acetate (PRED FORTE) 1 % DrpS Place 1 drop into the right eye 3 (three) times daily.  Qty: 1 Bottle, Refills: 2    Associated Diagnoses: Postoperative care for cataract       !! - Potential duplicate medications found. Please discuss with provider.      STOP taking these medications       spironolactone (ALDACTONE) 100 MG tablet Comments:   Reason for Stopping:         sulfamethoxazole-trimethoprim 800-160mg (BACTRIM DS) 800-160 mg Tab Comments:   Reason for Stopping:               I certify that this patient is confined to his home and needs intermittent skilled nursing care, physical therapy and occupational therapy.      "

## 2018-11-21 NOTE — PLAN OF CARE
"TN reviewed follow up appointment information as well as  "Post op and Cellulitis discharge instructions" handout with patient using teach back. Patient stated he will notify the doctor if he has redness, swelling, and a fever of 100.4 or greater. Patient is in agreement and verbalized an understanding. Placed discharge information in blue discharge folder.  TN also reviewed patient responsibility checklist with him using teach back. Patient was able to verbalize his responsibilities after discharge to manage his care at home bein. Going to follow up appointments   2. Picking up rx from the pharmacy when discharged  3. Taking his medications as prescribed     Patient informed that a nurse from [facility] will contact him to schedule his home visits.     TN also inquired of his help at home and his caregiver whom will assist with wound care dressing changes at home. Patient stated his sister Chloé Menjivar will assist at home with wound care.         18 1042   Final Note   Assessment Type Final Discharge Note   Anticipated Discharge Disposition Home-Health   What phone number can be called within the next 1-3 days to see how you are doing after discharge? ([phone])   Hospital Follow Up  Appt(s) scheduled? Yes   Discharge plans and expectations educations in teach back method with documentation complete? Yes   Right Care Referral Info   Post Acute Recommendation Home-care      "

## 2018-11-21 NOTE — PROGRESS NOTES
WRITTEN HEALTHCARE DISCHARGE INFORMATION     Things that YOU are responsible for to Manage Your Care At Home:  1. Getting your prescriptions filled.  2. Taking you medications as directed. DO NOT MISS ANY DOSES!  3. Going to your follow-up doctor appointments. This is important because it allows the doctor to monitor your progress and to determine if any changes need to be made to your treatment plan.    If you are unable to make your follow up appointments, please call the number listed and reschedule this appointment.     After discharge, if you need assistance, you can call [facility] On Call Nurse Care Line for 24/7 assistance at [phone]    Thank you for choosing [facility] and allowing us to care for you.   From your care manager:Vikki ART,TN @ [phone]     You should receive a call from [facility] Discharge Department within 48-72 hours to help manage your care after discharge. Please try to make sure that you answer your phone for this important phone call.     Follow-up Information     [facility]  On 11/27/2018.    Why:  On Tuesday @ 1:10pm, outpatient services Please go to 1st floor Patient Registration            Rubén Davis MD On 11/30/2018.    Specialty:  Family Medicine  Why:  On Friday @ 9:40am, outpatient services  Contact information:  [address] [facility] [address]  [phone]             Merle Chou DPM On 11/28/2018.    Specialty:  Podiatry  Why:  Podiatry Appt on Wednesday @ 9:30am, outpatient services  Contact information:  [address] [place] [address]  [address]  [place] [address]  [phone]             Mitch Chan MD On 12/13/2018.    Specialties:  Cardiology, Vascular Surgery, Surgery  Why:  On Thursday @ 4:00pm, outpatient services  Contact information:  [address]  [address]  [facility] [address]  [phone]             [facility].    Specialties:  DME Provider, Home Health Services  Contact information:  [address]  [place] [address]  [phone]

## 2018-11-21 NOTE — PROGRESS NOTES
TN contacted Mikayla morales [place] to inform of caregiver's name whom will be assisting with patient's wound care. No answer. Detailed message left with requested information.

## 2018-11-21 NOTE — ASSESSMENT & PLAN NOTE
-L foot wound multifactorial likely due to DM, PVD and venous insufficiency s/p LLE angiography with SFA, AT and peroneal angioplasty s/p L foot washout/debridement 11/14/18 s/p toe 2-5 amputations and foot debridement 11/16/18 - wound improving after debridement, remains extensive; infection cleared    -Ortho and Plastics have evaluated wound without further interventions recommended at this time - I will continue debridements, wound care recs as pt and family desire to not have further amputation at this time; risks and benefits of continued wound care vs BKA d/w with pt and family who state understanding and desire to continue local wound care and have continued eval by [facility] Care Center and Podiatry; f/u with me and wound care center 11/26/18  -Nutrition optimization  -Cont wound care consult recs  -Cont offloading  -Abx per primary team  -Strict glycemic control  -I will arrange close f/u with pt for wound recheck and have him see Wound Care Center for comprehensive wound care with Plastic surgery and HBO eval

## 2018-11-21 NOTE — CONSULTS
TN sent patient's clinicals (Packet, POC, PT notes, MAR and woundcare noted) to [facility] for resumption of care. Awaiting feedback.

## 2018-11-21 NOTE — DISCHARGE SUMMARY
[facility] Hospital Medicine  Discharge Summary      Patient Name: Marvin Ray  MRN: [id number]  Admission Date: 11/8/2018  Hospital Length of Stay: 13 days  Discharge Date and Time:  11/21/2018 10:54 AM  Attending Physician: Tree Ortega MD   Discharging Provider: Tree Ortega MD  Primary Care Provider: Rubén Davis MD      HPI:   59 yo male with CAD, HLP, hepatosplenomegaly/ascites, DM2 with CKD3 and retinopathy, PVD, gout, chronic combined CHF and chronic BLE wounds L>R presented from Dr. Chan office with concern for nonhealing wound to LLE. He was recently given bactrim and presented today with LAYNE, hyperkalemia and increased pain and edema to LLE. Pt has h/o Group G strep bacteremia (10/10). ICD implanted 11/2.  The patient's sister reports doing dressing changes of LE wounds 5 times a day. Noticeable weeping of fluid from the LE. Pt made NPO by vascular surgery and plan for angiogram and possible stent placement to LE to assist with better LE wound healing.     Procedure(s) (LRB):  INCISION AND DRAINAGE (Left)  AMPUTATION, TOES  2-5 (Left)      Hospital Course:   59 yo male with CAD, HLP, hepatosplenomegaly/ascites, DM2 with CKD3 and retinopathy, PVD, gout, chronic combined CHF and chronic BLE wounds L>R presented from Dr. Chan office with concern for nonhealing wound to LLE. He was recently given bactrim and presented today with LAYNE, hyperkalemia and increased pain and edema to LLE. Pt has h/o Group G strep bacteremia (10/10). ICD implanted 11/2.  The patient's sister reports doing dressing changes of LE wounds 5 times a day. Noticeable weeping of fluid from the LE. Pt made NPO by vascular surgery and plan for angiogram and possible stent placement to LE to assist with better LE wound healing. PT was admitted with non-healing Left foot wounds. Plan for angiogram  By vascular surgery cancelled due to hyperkalemia/LAYNE.  Pt received kayexalate x 2 and   Potassium persistently high. Was Add calcium gluconate x one dose and IV lasix. Monitor K levels and plan for possible intervention in next few days if labs improve. Wound care consulted.     11/10- serum potassium elevated, check urine electrolytes; calcium IV given, pt frustrated about being in hospital and not able to ambulate on his own   11/11- persistent hyperkalemia in setting of improving LAYNE; pt received IV lasix, kayexalate x2, calcium gluconate 1500mg; insulin and dextrose today, lasix IV again and albuterol high dose neb x one  11/12- potassium 4.9; nephrology added sodium bicarb; vascular surgery planning intervention with improved labs   S/P angiogram,on 11.13.18 ,1. US guided R CFA access  2. Aortogram   3. LLE angiogram  4. L SFA angioplasty with a 5 x 40 mm [place] balloon  5. L AT angioplasty with a 2 x 220 mm [place] balloon  6. L peroneal angioplasty with a 2 x 80 mm [place] balloon  7. Moderate sedation,  S/P wound debridemnemnt,intraoprative cultures grow acinobacter,strep,enterococus,adjusted IV Abx,DC clindamycin,started on Cipro,continue with Rocephin per sensitivity.  S/P toes 2-5 amputation.  He has hyperkalemia again,,will gave dose of kayexalate.imroved.  Had  local debridement .  plastic surgery was consulted and planing  For intervention as out patient.  Local wound care by wound care tewa was provided,,PT,OT was seeing patient.  Patient has been discharged home with HH,wound care,PO Abx and follow up with wound care center,vascular surgery,plastic,PCP in next 1- 2 weeks.     Consults:   Consults (From admission, onward)        Status Ordering Provider     Inpatient consult to Cardiology  Once     Provider:  Ismael Romano MD    Completed REBEKAH WALL     Inpatient consult to Nephrology  Once     Provider:  Anton Nicholson MD    Completed JOEL HERRERA     Inpatient consult to Orthopedic Surgery  Once     Provider:  Tyrese Gray MD    Completed MAE  REBEKAH BANIS     Inpatient consult to Plastic Surgery  Once     Provider:  Rebekah Purdy MD    Completed REBEKAH WALL     Inpatient consult to Social Work  Once     Provider:  (Not yet assigned)    Completed NOAM DORANTES     Inpatient consult to Vascular Surgery  Once     Provider:  Rebekah Wall MD    Completed BRADLEY QUINONEZ JR     IP consult to case management  Once     Provider:  (Not yet assigned)    Acknowledged BRADLEY QUINONEZ JR          No new Assessment & Plan notes have been filed under this hospital service since the last note was generated.  Service: Hospital Medicine    Final Active Diagnoses:    Diagnosis Date Noted POA    Cellulitis of left lower extremity [L03.116] 11/19/2018 Yes    Hyperkalemia [E87.5] 11/16/2018 Yes    Open wound of left foot [S91.302A] 11/08/2018 Yes    Acute renal failure superimposed on stage 3 chronic kidney disease [N17.9, N18.3] 11/08/2018 Yes    Chronic combined systolic and diastolic heart failure [I50.42] 10/16/2018 Yes     Chronic    Hepatosplenomegaly [R16.2] 10/12/2018 Yes    Stage 3 chronic kidney disease [N18.3] 10/12/2018 Yes     Chronic    PVD (peripheral vascular disease) [I73.9] 10/10/2018 Yes    CAD (coronary artery disease) [I25.10] 05/31/2016 Yes     Chronic    Essential hypertension [I10] 05/06/2016 Yes     Chronic    DM type 2 with diabetic peripheral neuropathy [E11.42] 10/22/2014 Yes     Chronic      Problems Resolved During this Admission:    Diagnosis Date Noted Date Resolved POA    PRINCIPAL PROBLEM:  Cellulitis of left lower leg [L03.116] 11/08/2018 11/16/2018 Yes    Metabolic acidosis [E87.2] 11/08/2018 11/16/2018 Yes    Hyperkalemia [E87.5] 11/08/2018 11/13/2018 Yes       Discharged Condition: stable    Disposition: Home-Health Care c    Follow Up:  Follow-up Information     [facility]  On 11/27/2018.    Why:  On Tuesday @ 1:10pm, outpatient services Please go to 1st floor Patient Registration             Rubén Davis MD On 11/30/2018.    Specialty:  Family Medicine  Why:  On Friday @ 9:40am, outpatient services  Contact information:  [address] [facility]  [place] [address]  [phone]             Merle Chou DPM On 11/28/2018.    Specialty:  Podiatry  Why:  Podiatry Appt on Wednesday @ 9:30am, outpatient services  Contact information:  [address] [place], [place] [address]  [address]  [place] [address]  [phone]             Mitch Chan MD On 12/13/2018.    Specialties:  Cardiology, Vascular Surgery, Surgery  Why:  On Thursday @ 4:00pm, outpatient services  Contact information:  [address]  [address]  [place] [address]  [phone]             [facility].    Specialties:  DME Provider, Home Health Services  Contact information:  [address] [facility]  [address]  [place] [address]  [phone]                 Patient Instructions:      Activity as tolerated       Significant Diagnostic Studies: Labs:   BMP:   Recent Labs   Lab 11/20/18  0550 11/21/18  0501   * 155*   * 133*   K 4.3 4.3    104   CO2 25 22*   BUN 36* 35*   CREATININE 0.8 0.9   CALCIUM 8.4* 8.3*    and CBC No results for input(s): WBC, HGB, HCT, PLT in the last 48 hours.  Microbiology:   Blood Culture   Lab Results   Component Value Date    LABBLOO No growth after 5 days. 11/08/2018    and Wound Culture: positive  Radiology: X-Ray: CXR: X-Ray Chest 1 View (CXR): No results found for this visit on 11/08/18. and X-Ray Chest PA and Lateral (CXR): No results found for this visit on 11/08/18.    Pending Diagnostic Studies:     None         Medications:  Reconciled Home Medications:      Medication List      START taking these medications    * amLODIPine 5 MG tablet  Commonly known as:  NORVASC  TAKE 1 TABLET(5 MG) BY MOUTH EVERY DAY     * amLODIPine 5 MG tablet  Commonly known as:  NORVASC  TAKE 1 TABLET(5 MG) BY MOUTH EVERY DAY     ciprofloxacin HCl 500 MG tablet  Commonly known as:  CIPRO  Take 1 tablet  (500 mg total) by mouth every 12 (twelve) hours. for 14 days     collagenase ointment  Commonly known as:  SANTYL  Apply topically once daily.     doxycycline 100 MG capsule  Commonly known as:  MONODOX  Take 1 capsule (100 mg total) by mouth every 12 (twelve) hours. for 14 days         * This list has 2 medication(s) that are the same as other medications prescribed for you. Read the directions carefully, and ask your doctor or other care provider to review them with you.            CHANGE how you take these medications    fish oil-omega-3 fatty acids 300-1,000 mg capsule  Take 2 capsules (2 g total) by mouth 2 (two) times daily.  What changed:  how much to take     furosemide 40 MG tablet  Commonly known as:  LASIX  Take 1 tablet (40 mg total) by mouth 2 (two) times daily.  What changed:    · medication strength  · how much to take  · when to take this        CONTINUE taking these medications    allopurinol 100 MG tablet  Commonly known as:  ZYLOPRIM  Take 2 tablets (200 mg total) by mouth once daily.     aspirin 81 MG Chew  Take 81 mg by mouth once daily.     benazepril 40 MG tablet  Commonly known as:  LOTENSIN  TAKE 1 TABLET(40 MG) BY MOUTH TWICE DAILY     brimonidine 0.1% 0.1 % Drop  Commonly known as:  ALPHAGAN P  Place 1 drop into both eyes 3 (three) times daily.     carvedilol 6.25 MG tablet  Commonly known as:  COREG  Take 1 tablet (6.25 mg total) by mouth 2 (two) times daily.     coenzyme Q10 100 mg capsule  Take 100 mg by mouth every morning.     dorzolamide-timolol 2-0.5% 22.3-6.8 mg/mL ophthalmic solution  Commonly known as:  COSOPT  Place 1 drop into both eyes 3 (three) times daily.     ezetimibe 10 mg tablet  Commonly known as:  ZETIA  Take 1 tablet (10 mg total) by mouth once daily.     insulin glargine-lixisenatide 100 unit-33 mcg/mL Inpn  Commonly known as:  SOLIQUA 100/33  Inject 34 units once daily     ketorolac 0.5% 0.5 % Drop  Commonly known as:  ACULAR  Place 1 drop into the right eye 3  "(three) times daily.     multivitamin Tab  Take 1 tablet by mouth every morning.     pen needle, diabetic 31 gauge x 1/4" Ndle  Use as directed     prednisoLONE acetate 1 % Drps  Commonly known as:  PRED FORTE  Place 1 drop into the right eye 3 (three) times daily.        STOP taking these medications    spironolactone 100 MG tablet  Commonly known as:  ALDACTONE     sulfamethoxazole-trimethoprim 800-160mg 800-160 mg Tab  Commonly known as:  BACTRIM DS            Indwelling Lines/Drains at time of discharge:   Lines/Drains/Airways     Drain                 Open Drain 11/14/18 1353 Left Foot Other (see comments) Other (see comments) 6 days          Airway                 Airway - Non-Surgical 11/16/18 0756 Nasal Cannula 5 days         Airway - Non-Surgical 11/16/18 0815 Other (Comment) 5 days          Epidural Line                 Perineural Analgesia/Anesthesia Assessment (using dermatomes) Epidural 11/14/18 1245 6 days                Time spent on the discharge of patient: more than 30  minutes  Patient was seen and examined on the date of discharge and determined to be suitable for discharge.         Tree Ortega MD  Department of Hospital Medicine  [facility]  "

## 2018-11-21 NOTE — NURSING
Received report from JOSLYN Campbell. Alert and oriented. Denies pain. No sign of distress. Left foot dressing intact and dry. Call bell given on his reach. Instructed to call for assistance, specially getting out of bed. Bed alarm on. Safety maintained.

## 2018-11-21 NOTE — PROGRESS NOTES
TN spoke with Charlene at [facility]. Charlene requesting dimensions of wound for dispensing of medication.    2062- TN contacted [facility] call center @ [phone] to provide dimensions of wound. Spoke with Nelson Schulz. Jazz stated patient will need 1710g of Santyl which is 57 tubes for a 30 day supply. Jazz will send to patient's pharmacy. Jazz placed a note to process the amount covered by patient's insurance. Patient can  medication from his pharmacy when ready.

## 2018-11-21 NOTE — NURSING
Discharge instructions giving to patient and sister. Patient left with transport by wheelchair to the family vehicle. No distress noted.

## 2018-11-21 NOTE — PROGRESS NOTES
TN contacted [facility] to inquire if [facility] will supply patient's Santyl medication for patient's wound care. Spoke with Mikayla. Mikayla stated that [facility] does not supply the product and that patient will need a prescription for the medication.       1024- TN contacted patient's pharmacy to verify availability and cost for Santyl. Spoke with Pao. Pao stated that patient's script was sent to the  specialty retail support center due to it being a  specialty drug. Pao stated that someone from the center will contact the patient to coordinate /delivery. The medication will cost $2.60.     [facility] [address] [place] [address] EXPY AT [facility] OF [place] [address] [address]  Phone: [phone] Fax: [phone]    1040- TN contacted [facility] to inquire of name of specialty pharmacy. Spoke with Diana. Diana stated  [facility] is not in network. The closest in network pharmacy is [facility] in [place], [place]

## 2018-11-21 NOTE — UM SECONDARY REVIEW
VP Medical Affairs    IP Extended Stay > 10    Hospital Day # 14  Discussed at MDT's   Amputation of Toes after stabilizing medical Condition  Discharge written for today    LOS: approved an agreement with D/C plan

## 2018-11-23 ENCOUNTER — PATIENT OUTREACH (OUTPATIENT)
Dept: ADMINISTRATIVE | Facility: CLINIC | Age: 58
End: 2018-11-23

## 2018-11-23 ENCOUNTER — TELEPHONE (OUTPATIENT)
Dept: OPHTHALMOLOGY | Facility: CLINIC | Age: 58
End: 2018-11-23

## 2018-11-23 NOTE — TELEPHONE ENCOUNTER
----- Message from Abbie Puentes RN sent at 11/23/2018  1:16 PM CST -----  Pt above d/c 11/22 from hospital and wife would like clarification if prednisolone eye drops  are to be continued.   AVS says continue, however pt's wife stating  Mr Ray was not receiving in hospital. Patient is quite concerned and requesting a call  Please reach out to pt, if possible.    THANKING YOU IN ADVANCE,    Abbie Puentes RN  [address]

## 2018-11-23 NOTE — PROGRESS NOTES
C3 nurse attempted to contact patient. The following occurred:   C3 nurse attempted to contact Marvin Ray  for a TCC post hospital discharge follow up call. The patient is unable to conduct the call @ this time. The patient requested a callback.    The patient has a scheduled HOSFU appointment with Rubén Davis MD  on 11/30 @ 0940. Message sent to Physician staff.

## 2018-11-23 NOTE — PHYSICIAN QUERY
"PT Name: Marvin Ray  MR #: [id number]    Physician Query Form - Procedure Clarification     CDS/: Constanza Cota               Contact information: [email or web address]    This form is a permanent document in the medical record.     Query Date: November 23, 2018  By submitting this query, we are merely seeking further clarification of documentation. Please utilize your independent clinical judgment when addressing the question(s) below.    The Medical record contains the following:     Indicators       Supporting Clinical Findings   Location in Medical Record   x Documentation of "Debridement"    Next, a scalpel was   used to debride overlying eschar to his left foot wound.  Bleeding tissue was   noted underneath the eschar.  Fibrinous tissue was noted to the dorsal aspect   and this was debrided to bleeding tissue as well.  The left second through fifth   toes were amputated with a scalpel to the level of the metatarsal head and this   was resected with a rongeur.  Bone cultures were sent as well as the toe   specimens for culture and pathology.  Cultures were also obtained with swabs   from this area of toe amputation.  There was no evidence of deep infection.  The   entire left foot wound was debrided and bleeding tissue was noted.    Electrocautery was used to obtain hemostasis as well as manual pressure     Op note 11/16     Documentation of "I & D"      EBL =      Other:       Excisional debridement is a surgical removal of  nonvitalized tissue, necrosis or slough. The use of a sharp instrument does not always indicate that an excisional debridement was performed.  Non excisional debridement is the scraping, washing, irrigating, brushing away or removal of loose tissue fragments.    Provider, please specify type of procedure(s) performed:    [ X ] Excisional Debridement (Specify site and depth of tissue removed)   * Site: (Specify)__Left foot___________________________________   * Depth of tissue " excised:    [  ] Skin [  ]Subcutaneous Tissue/Fascia   [ ] Muscle [  ] Tendon [ x] Bone     [  ] Non-excisional Debridement   * Depth of tissue excised:    [  ] Skin [  ]Subcutaneous Tissue/Fascia   [ ] Muscle [  ] Tendon [ ] Bone       [  ] Other Procedure (Specify) ________________________________    [   ] Clinically Undetermined

## 2018-11-23 NOTE — PATIENT INSTRUCTIONS
Discharge Instructions for Cellulitis  You have been diagnosed with cellulitis. This is an infection in the deepest layer of the skin. In some cases, the infection also affects the muscle. Cellulitis is caused by bacteria. The bacteria can enter the body through broken skin. This can happen with a cut, scratch, animal bite, or an insect bite that has been scratched. You may have been treated in the hospital with antibiotics and fluids. You will likely be given a prescription for antibiotics to take at home. This sheet will help you take care of yourself at home.  Home Care  Take the prescribed antibiotic medication you are given as directed until it is gone. Take it even if you feel better. It treats the infection and stops it from returning. Not taking all of the medication can also make future infections hard to treat.  Keep the infected area clean.  When possible, raise the infected area above the level of your heart. This helps keep swelling down.  Talk to your doctor if you are in pain. Ask what kind of over-the-counter medication you can take for pain.  Apply clean bandages as advised.  Take your temperature once a day for a week.  Wash your hands often to prevent spreading the infection.  In the future, wash your hands before and after you touch cuts, scratches, or bandages. This will help prevent infection.   Follow-Up  Make a follow-up appointment as advised by our staff.    When to Call Your Doctor  Call your doctor immediately if you have any of the following:  Vomiting  Fever of 100.4°F (38°C) or higher, or as directed by your healthcare provider  Shaking chills  Redness that gets worse in or around the infected area  Swelling of the infected area  Pain that gets worse in or around the infected area  Difficulty or pain when moving the joints above or below the infected area  Discharge or pus draining from the area   © 7137-7841 [employer], [address] [place] [address], [place] [address]. All rights  reserved. This information is not intended as a substitute for professional medical care. Always follow your healthcare professional's instructions.

## 2018-11-23 NOTE — TELEPHONE ENCOUNTER
Advised pt to continue Ketorolac until his apt w/ Dr. Rodas on Monday 11/26  His sister, who is his caretaker, verbalized understanding

## 2018-11-23 NOTE — PHYSICIAN QUERY
"PT Name: Marvin Ray  MR #: [id number]    Physician Query Form - Procedure Clarification     CDS/: Constanza Cota               Contact information: [email or web address]    This form is a permanent document in the medical record.     Query Date: November 23, 2018  By submitting this query, we are merely seeking further clarification of documentation. Please utilize your independent clinical judgment when addressing the question(s) below.    The Medical record contains the following:     Indicators       Supporting Clinical Findings   Location in Medical Record   x Documentation of "Debridement"   The eschar   overlying the right foot wound was debrided off with a scalpel.  There were two   areas of deep infection on the dorsal aspect of the foot and these were   cultured.  Counterincisions were made and red rubber catheters were inserted   into these tracts.  All purulence was expressed.  There was bleeding tissue   underlying the eschar and no further evidence of deep infection.  The toes were   noted to be dry and gangrenous, toes 2 through 5.  The heel wound was dry eschar   and this was not debrided off down to muscle.  Electrocautery was used to control bleeding as   well as manual pressure.    1.  Left foot wound debridement down to muscle.       Op note 11/14      Documentation of "I & D"      EBL =      Other:       Excisional debridement is a surgical removal of  nonvitalized tissue, necrosis or slough. The use of a sharp instrument does not always indicate that an excisional debridement was performed.  Non excisional debridement is the scraping, washing, irrigating, brushing away or removal of loose tissue fragments.    Provider, please specify type of procedure(s) performed:    [ x ] Excisional Debridement (Specify site and depth of tissue removed)   * Site: (Specify)_____________________________________   * Depth of tissue excised:    [  ] Skin [  ]Subcutaneous Tissue/Fascia   [ x] Muscle [  ] " Tendon [ ] Bone     [  ] Non-excisional Debridement   * Depth of tissue excised:    [  ] Skin [  ]Subcutaneous Tissue/Fascia   [ ] Muscle [  ] Tendon [ ] Bone       [  ] Other Procedure (Specify) ________________________________    [  ] Clinically Undetermined

## 2018-11-26 ENCOUNTER — OFFICE VISIT (OUTPATIENT)
Dept: OPHTHALMOLOGY | Facility: CLINIC | Age: 58
End: 2018-11-26
Payer: COMMERCIAL

## 2018-11-26 VITALS — SYSTOLIC BLOOD PRESSURE: 102 MMHG | DIASTOLIC BLOOD PRESSURE: 60 MMHG | HEART RATE: 66 BPM

## 2018-11-26 DIAGNOSIS — H40.51X1 NEOVASCULAR GLAUCOMA, RIGHT EYE, MILD STAGE: ICD-10-CM

## 2018-11-26 PROCEDURE — 99499 UNLISTED E&M SERVICE: CPT | Mod: S$GLB,,, | Performed by: OPHTHALMOLOGY

## 2018-11-26 PROCEDURE — 99999 PR PBB SHADOW E&M-EST. PATIENT-LVL II: CPT | Mod: PBBFAC,,, | Performed by: OPHTHALMOLOGY

## 2018-11-26 PROCEDURE — 67228 TREATMENT X10SV RETINOPATHY: CPT | Mod: 79,RT,S$GLB, | Performed by: OPHTHALMOLOGY

## 2018-11-26 NOTE — BRIEF OP NOTE
[facility]  Brief Operative Note    SUMMARY     Surgery Date: 11/16/2018     Surgeon(s) and Role:     * Mitch Chan MD - Primary    Assisting Surgeon: None    Pre-op Diagnosis:  Cellulitis of left lower extremity [L03.116]    Post-op Diagnosis:  Post-Op Diagnosis Codes:     * Cellulitis of left lower extremity [L03.116]    Procedure(s) (LRB):  INCISION AND DRAINAGE (Left)  AMPUTATION, TOES  2-5 (Left)    Anesthesia: Regional    Description of Procedure: gangrenous L 2-5 toes    Description of the findings of the procedure: no deep infection, bleeding tissue to L foot     Estimated Blood Loss: 5cc         Specimens:   Specimen (12h ago, onward)    None

## 2018-11-26 NOTE — PROGRESS NOTES
HPI     DLS 10/9/18- Vision blurry OU    Cosopt TID OU   Alphagan TID OU   PF TID OD    Prior OCT : OD shows central DME, stable   OS ERM with DME central cyst, stable    Prior FA: shows macular leakage OU, significant NP OU, no NV OU, enlarged SURINDER OU    A/P    1. PDR s/p PRP OU (DM2), with CSME OU (and HTN)  - Encouraged good BS/BP/Cholesterol control  T2 uncontrolled on insulin    2. DME OU, OD>OS  - S/p Avastin OD x 16, OS x 7  - Will monitor OS for now given NVG and ERM  - Could consider Ozurdex but pt is POAG and phakic, IOP good right now)  With some NVI/NVA post CE OD  Will inject avastin q 3 month for now    - PRP fill in OD today      2. NVG OS   FH:   CCT:449 // 540    Gonio: OD CBB; OS sup CBB/nasal small NV at 9:00/inf PAS/temp CBB with PAS   Surgeries:;  phaco/BV 2012   Lasers: SLT   Tmax:    Tbase (before treatment):   Ttarget:     Drop intolerance:    APD:     - Stopped Xalatan OD due to macular edema  - pt reports good adherence  - No NVA OD. 1 NVA vessel at 9 oclock OS  - Cont drops as above    Sees KL    3. PCIOL OD  Some NVA noted after CE      4. PSK OS  - Stable, CTM, RD precautions       5. ERM OS>OD  - Given NVG hx, monitor  - CTM      6 weeks OCT, possible LIAM OD depending on findings    Risks, benefits, and alternatives to treatment discussed in detail with the patient.  The patient voiced understanding and wished to proceed with the procedure    Patient Identified and Time Out complete    Laser Procedure Note  Dx: NVG OD  Laser: PRP OD  Argon  Spot: 200  Power: 150-180  Dur: 0.100 s  #:  550  Complications: None  F/U as above

## 2018-11-26 NOTE — OP NOTE
DATE OF PROCEDURE:  11/16/2018.    SERVICE:  Vascular Surgery.    SURGEON:  Mitch Chan M.D.    ASSISTANT:  None.    PROCEDURES PERFORMED:  1.  Left foot wound debridement, foot wound measured 24 cm x 16 cm x 0.5 cm.  2.  Left foot washout.  3.  Left second through five toe amputations, including the metatarsal head.    DESCRIPTION OF PROCEDURE:  The patient was seen preoperatively by Anesthesia and   was deemed stable for surgery.  His vital signs are stable.  He received a   regional block to left lower extremity by Anesthesia.  He was brought to the   Operating Room on 11/16/2018 and his foot was prepped and draped in sterile   fashion.  A timeout was performed and preoperative antibiotics were continued by   nursing.  A scalpel was used to remove all overlying eschar and a curette was   used to remove overlying fibrinous tissue at the dorsum of the foot.  The left   second through fifth toes were removed with a scalpel to the base of the MTP   joint and the metatarsal heads were rongeured back to remove all avascular   tissue.  Tendons were removed with sharp dissection.  The wound was bleeding   copiously and manual pressure and electrocautery was used to maintain   hemostasis.  The remaining aspects of the left foot wound were debrided with a   scalpel and Metzenbaum scissors.  The wound was irrigated with bacitracin-soaked   saline.  Debridement was continued down to bone and muscle.  There was no   evidence of deep infection and the red rubber catheters were left in place.  The   wound was dressed with a sterile wet-to-dry bacitracin-soaked saline dressing   and overlying Kerlix, cast padding and Ace bandage.  The patient was transferred   from the Operating Room table to the stretcher and subsequently to the Recovery   Room in stable condition.    COMPLICATIONS:  None mL    ESTIMATED BLOOD LOSS:  5 mL.    ANESTHESIA:  Regional block, left lower extremity.      CCL/IN  dd: 11/26/2018 16:27:24 (CST)  td:  11/26/2018 16:44:31 (CST)  Doc ID   #[id number]  Job ID #[id number]    CC:

## 2018-11-27 ENCOUNTER — HOSPITAL ENCOUNTER (OUTPATIENT)
Dept: WOUND CARE | Facility: HOSPITAL | Age: 58
Discharge: HOME | End: 2018-11-27
Attending: FAMILY MEDICINE
Payer: COMMERCIAL

## 2018-11-27 DIAGNOSIS — L97.529 TYPE 2 DIABETES MELLITUS WITH LEFT DIABETIC FOOT ULCER: Primary | ICD-10-CM

## 2018-11-27 DIAGNOSIS — E11.621 TYPE 2 DIABETES MELLITUS WITH LEFT DIABETIC FOOT ULCER: Primary | ICD-10-CM

## 2018-11-27 PROCEDURE — 99214 OFFICE O/P EST MOD 30 MIN: CPT | Mod: ,,, | Performed by: FAMILY MEDICINE

## 2018-11-27 PROCEDURE — 99214 OFFICE O/P EST MOD 30 MIN: CPT | Performed by: FAMILY MEDICINE

## 2018-11-28 ENCOUNTER — TELEPHONE (OUTPATIENT)
Dept: CARDIOLOGY | Facility: CLINIC | Age: 58
End: 2018-11-28

## 2018-11-28 ENCOUNTER — PES CALL (OUTPATIENT)
Dept: ADMINISTRATIVE | Facility: CLINIC | Age: 58
End: 2018-11-28

## 2018-11-28 NOTE — TELEPHONE ENCOUNTER
Spoke to Chloé, set up appt for [facility] device ck and see Dr Greer for 12/04,       ----- Message from Jacinto Corbin sent at 11/28/2018  1:33 PM CST -----  Contact: Chloé/Sister/[phone]  Chloé would like to speak to the staff about patient's diagnosis. She stated that the Home Health nurse stated that that the patient has Congestive Heart Failure. Chloé would like to clarify the patient's diagnosis. Thank you.

## 2018-11-29 ENCOUNTER — OFFICE VISIT (OUTPATIENT)
Dept: VASCULAR SURGERY | Facility: CLINIC | Age: 58
End: 2018-11-29
Attending: HOSPITALIST
Payer: COMMERCIAL

## 2018-11-29 VITALS
SYSTOLIC BLOOD PRESSURE: 116 MMHG | DIASTOLIC BLOOD PRESSURE: 68 MMHG | WEIGHT: 245 LBS | HEIGHT: 70 IN | BODY MASS INDEX: 35.07 KG/M2

## 2018-11-29 DIAGNOSIS — I73.9 PVD (PERIPHERAL VASCULAR DISEASE): Primary | ICD-10-CM

## 2018-11-29 DIAGNOSIS — I70.244 ATHEROSCLEROSIS OF NATIVE ARTERY OF LEFT LOWER EXTREMITY WITH ULCERATION OF MIDFOOT: ICD-10-CM

## 2018-11-29 PROCEDURE — 3074F SYST BP LT 130 MM HG: CPT | Mod: CPTII,S$GLB,, | Performed by: SURGERY

## 2018-11-29 PROCEDURE — 99024 POSTOP FOLLOW-UP VISIT: CPT | Mod: S$GLB,,, | Performed by: SURGERY

## 2018-11-29 PROCEDURE — 3078F DIAST BP <80 MM HG: CPT | Mod: CPTII,S$GLB,, | Performed by: SURGERY

## 2018-11-29 PROCEDURE — 99999 PR PBB SHADOW E&M-EST. PATIENT-LVL V: CPT | Mod: PBBFAC,,, | Performed by: SURGERY

## 2018-11-29 PROCEDURE — 3008F BODY MASS INDEX DOCD: CPT | Mod: CPTII,S$GLB,, | Performed by: SURGERY

## 2018-11-29 NOTE — PROGRESS NOTES
Assessment /Plan     For exam results, see Encounter Report.    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, with long-term current use of insulin    Epiretinal membrane, both eyes    Diabetic macular edema of right eye with proliferative retinopathy associated with type 2 diabetes mellitus    Diabetic macular edema of left eye with proliferative retinopathy associated with type 2 diabetes mellitus    Pseudophakia    History of glaucoma tube shunt procedure    Rubeosis iridis, right            Pt has been followed in the retina and glaucoma clinics at [facility] for many years  Now is transferring to [facility] main campus (2016)   S/P PRP ou for PDR ou   S/P multiple avastin injections ou  + NVG os // ? POAG od   S/P ahmed os 2011  S/P phaco/IOL / baerveldt os 2012       1. NVG OS 2/2 PDR // ?? POAG od - on gtts    First HVF   ? 10/2014    First photos   6/2016   Treatment / Drops started   Years ago           Family history    ?        Glaucoma meds    cosopt ou / alphagan ou         H/O adverse rxn to glaucoma drops    ? Try and avoid PG's - PDR with ME and NVG os         LASERS    SLT - ? Os or OU // G6 laser os // PRP ou         GLAUCOMA SURGERIES    ahmed os // baerveldt os - both at [facility] //         OTHER EYE SURGERIES    Phaco/IOL od 9/26/2018         CDR    0.4/0.9        Tbase    ??  (on gtts at [facility] from 2014 to 2016  ran - 12-19 od // 16-25 os )         Tmax    ?             Ttarget    ?             HVF    4 test 2014 to  2015 - [facility] - [facility] 2 test 2016 to 2018  - SAD/IAD od // gen dep - SAD / IAD os         Gonio    +3 od // +3 sup os with PAS T/N/I         CCT    449/540        OCT    2 test 2016 to 2018 - RNFL - dec S/I od  od // dec S/I, bord N  os        HRT    2 test 2017 to 2018 -  -  nl  od // dec TI/ bord NI  os        Disc photos    2016     - Ttoday    15/12  - Test done today    F/U phaco/IOL od // rubeosis check - improved od post more avastin and fill in PRP      - OFF -  Xalatan OD due to macular edema  - pt reports good adherence  - cont xal os, cosopt tid ou, alphagan tid ou      2.  PDR s/p PRP OU (DM2), with CSME OU (and HTN)  - Encouraged good BS/BP/Cholesterol control    3. DME OU  OD>OS  S/p Avastin OD x 9, OS x 7  Will monitor OS for now given NVG and ERM    4. NSC OD  - Not VS    5. PSK OS  - Stable, CTM, RD precautions       6. ERM OS  Given NVG hx, monitor  - CTM      S/P GDD x 2 os - the ST one is retracting out of cornea - just barely in - still functioning - lrg bleb over plate   S/P G6 CB destruction - OS - 2/16/2017 // pre-op IOP was 22     Cont cosopt ou tid   Cont alphagan tid ou   Stay off  latanoprost - was using os only - 2/2 DME od     Post -op  phaco/IOL od - ok to proceed wit CE  per brennon -   ?? Can get avastin inj a week or so before surgery -   - dilates ok // + cortical spokes armd monocular / use trypan blue     Phaco / IOL OD Date: 9/26/2018 - PCB00 17.0   POM  2.5   - phaco/IOL   Developed -  NEW RUBEOSIS AT THE PUPIL OD AND 1 SMALL AREA OF ANGLE VIRI ING OD    S/P more avastin od 10/9/2018 - Anatolyzulla - rubeosis regressed    S/p fill in PRP od - 11/26/2018 - Mazzulla - rubeosis regressed   -   Pred Acetate tid - decrease to q day   ketoorolac - tid  - STOP   VA good - BCVA - 20/30 - 11/30/2018    Cont cosopt tid ou - decrease to bid ou   Cont alphagan P tid ou - decrease to bid ou     If IOP goes up OS  can re-start PG's os and / or repeat cyclo G6  F/U 2 months - AR/MR ou // rubeosis check - review retina notes     I have seen and personally examined the patient.  I agree with the findings, assessment and plan of the resident and/or fellow.     Perla Cortés MD

## 2018-11-29 NOTE — PATIENT INSTRUCTIONS
Diabetes and Peripheral Arterial Disease (PAD)    Diabetes is a condition in which your body has trouble using a sugar called glucose, for energy. As a result, the sugar level in your blood becomes too high. Diabetes is a chronic (lifelong) condition. It puts you at high risk for peripheral arterial disease (PAD). This is a disease of arteries in the legs. If you have PAD, arteries in other parts of your body are likely diseased, too. That puts you at high risk for other serious health problems. Read on to learn how diabetes can lead to PAD and affect your health.  How diabetes can lead to PAD  Diabetes can hurt your arteries. If diabetes is not controlled well, blood sugar levels will be high. High blood sugar can make the artery walls rough. A waxy substance in the blood called plaque can then build up on the artery walls. This plaque contains cholesterol. This makes it harder for blood to flow through your arteries. This limits blood flow to your arms and legs, which causes damage in the tissues. The feet are most at risk of tissue damage. If tissue damage is very bad, then toes, feet, or even legs may need to be removed (amputated). But blood sugar and cholesterol levels can be controlled. This is done with nutrition and exercise. Weight loss and medication may also help. And proper foot care is very important for people with PAD.  If diabetes is not controlled  Uncontrolled diabetes can cause many complications, including:  · Heart disease  · Stroke  · Kidney damage or kidney failure (nephropathy)  · Liver disease  · Digestion problems  · Nerve damage (neuropathy)  · Eye damage (retinopathy)  · Sexual dysfunction  · Periodontal (gum) disease  · Needing a toe, foot, or leg amputated (if you also have PAD)  If diabetes is controlled  Controlling diabetes can reduce your risk for serious health problems, including:  · Heart disease and stroke  · Kidney disease   · Eye disease   · Neuropathy   · With proper foot  care, the need for amputation   Date Last Reviewed: 7/1/2016  © 7452-3345 [employer]. [address] [place] [address], [place] [address]. All rights reserved. This information is not intended as a substitute for professional medical care. Always follow your healthcare professional's instructions.

## 2018-11-29 NOTE — H&P (VIEW-ONLY)
Mitch Chan MD [facility]                         11/29/2018    HPI:  Marvin Ray is a 58 y.o. 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, with long-term current use of insulin    Neovascular glaucoma of left eye, severe stage    Statin myopathy    Neovascular glaucoma, right eye, mild stage    Left leg cellulitis    PVD (peripheral vascular disease)    Right wrist pain    Multiple open wounds of lower leg    Hepatosplenomegaly    Stage 3 chronic kidney disease    Chronic combined systolic and diastolic heart failure    Non-pressure chronic ulcer of other part of left foot with fat layer exposed    Type 2 diabetes mellitus with left diabetic foot ulcer    Open wound of left foot    Acute renal failure superimposed on stage 3 chronic kidney disease    Hyperkalemia    Cellulitis of left lower extremity    being managed by PCP and specialists who is here today for evaluation of L foot wound.  Seen in hospital last mo with LLE cellulitis.  Treated with Abx.  Also had paracentesis for ascites with negative w/u per family.  Patient states location is L foot occurring for 1-2 mo, worsening foot wound although improvement in edema and erythema.  Associated signs and symptoms include pain and edema.  Quality is aching and severity is 5/10.  Symptoms began 10/2018 spontaneously with blistering and severe edema.  Alleviating factors include wound care and elevation.  Worsening factors include  pressure.    Tobacco use: denies    Past Medical History:   Diagnosis Date    Arthritis     Cellulitis     CKD (chronic kidney disease), stage III     Coronary artery disease     Diabetes mellitus     Diabetic retinopathy     Diabetic ulcer of left foot     Glaucoma     Gout     Hyperlipemia     Hypertension     ICD (implantable cardioverter-defibrillator) in place 11/02/2018    Left chest    Non-pressure chronic ulcer of other part of left foot with fat layer exposed 10/23/2018    PVD (peripheral vascular disease)     Type 2 diabetes mellitus with left diabetic foot ulcer 10/29/2018    Unsteady gait     uses a wheelchair     Past Surgical History:   Procedure Laterality Date    AHMED GLAUCOMA IMPLANT Left 2011    DONE AT [facility]    AMPUTATION, TOES  2-5 Left 11/16/2018    Performed by Mitch Chan MD at [facility] OR    BAERVELDT GLAUCOMA IMPLANT Left 2012    WITH CATARACT EXTRACTION//DONE AT [facility]    CARDIAC CATHETERIZATION Left 05/2016    CARDIAC DEFIBRILLATOR PLACEMENT Left 11/02/2018    CATARACT EXTRACTION W/  INTRAOCULAR LENS IMPLANT Left 2012    WITH BAERVEDT//DONE AT [facility]    CATARACT EXTRACTION W/  INTRAOCULAR LENS IMPLANT Right 09/26/2018    COMPLEX ()    CB DESTRUCTION WITH CYCLO G6 Left 02/15/2017        CYST REMOVAL      HEART CATH-LEFT N/A 5/6/2016    Per[facility] at [facility] CATH LAB    INCISION AND DRAINAGE Left 11/14/2018    Procedure: Incision and Drainage, left lower extremity debridement, washout;  Surgeon: Mitch Chan MD;  Location: [facility] OR;  Service: Vascular;  Laterality: Left;    INCISION AND DRAINAGE Left 11/16/2018    Procedure: INCISION AND DRAINAGE;  Surgeon: Mitch Chan MD;  Location: [facility] OR;  Service: Vascular;  Laterality: Left;    INCISION AND DRAINAGE Left 11/16/2018    Performed by Mitch Chan MD at [facility] OR    Incision and Drainage, left lower extremity debridement, washout Left  11/14/2018    Performed by Mitch Chan MD at [facility] OR    INSERTION, ICD GENERATOR, SINGLE CHAMBER N/A 11/2/2018    Performed by Pernell Greer MD at [facility] CATH LAB    INSERTION, IOL PROSTHESIS Right 9/26/2018    Performed by Perla Cortés MD at [facility] OR [address] [place] [address]    INTRAOCULAR PROSTHESES INSERTION Right 9/26/2018    Procedure: INSERTION, IOL PROSTHESIS;  Surgeon: Perla Cortés MD;  Location: [facility] OR [address] [place] [address];  Service: Ophthalmology;  Laterality: Right;    PHACOEMULSIFICATION OF CATARACT Right 9/26/2018    Procedure: PHACOEMULSIFICATION, CATARACT;  Surgeon: Perla oCrtés MD;  Location: [facility] OR [address] [place] [address];  Service: Ophthalmology;  Laterality: Right;    PHACOEMULSIFICATION, CATARACT Right 9/26/2018    Performed by Perla Cortés MD at [facility] OR [address] [place] [address]    Right foot surgery  10/2014    TOE AMPUTATION Right     first and second    TOE AMPUTATION Left 11/16/2018    Procedure: AMPUTATION, TOES  2-5;  Surgeon: Mitch Chan MD;  Location: [facility] OR;  Service: Vascular;  Laterality: Left;    TONSILLECTOMY      TRABECULECTOMY/G 6 LASER Left 2/15/2017    Performed by Perla Cortés MD at [facility] OR [address] [place] [address]     Family History   Problem Relation Age of Onset    Diabetes Mother     Heart disease Brother     No Known Problems Father     No Known Problems Sister     No Known Problems Maternal Aunt     No Known Problems Maternal Uncle     No Known Problems Paternal Aunt     No Known Problems Paternal Uncle     No Known Problems Maternal Grandmother     No Known Problems Maternal Grandfather     No Known Problems Paternal Grandmother     No Known Problems Paternal Grandfather     Anemia Neg Hx     Arrhythmia Neg Hx     Asthma Neg Hx     Clotting disorder Neg Hx     Fainting Neg Hx     Heart attack Neg Hx     Heart failure Neg Hx     Hyperlipidemia Neg Hx     Hypertension Neg Hx     Stroke Neg Hx     Atrial Septal Defect Neg Hx     Amblyopia Neg Hx      Blindness Neg Hx     Glaucoma Neg Hx     Macular degeneration Neg Hx     Retinal detachment Neg Hx     Strabismus Neg Hx      Social History     Socioeconomic History    Marital status: Legally      Spouse name: Not on file    Number of children: Not on file    Years of education: 14    Highest education level: Not on file   Social Needs    Financial resource strain: Not on file    Food insecurity - worry: Not on file    Food insecurity - inability: Not on file    Transportation needs - medical: Not on file    Transportation needs - non-medical: Not on file   Occupational History    Not on file   Tobacco Use    Smoking status: Former Smoker     Packs/day: 1.00     Years: 3.00     Pack years: 3.00     Last attempt to quit: 1984     Years since quittin.4    Smokeless tobacco: Never Used   Substance and Sexual Activity    Alcohol use: Yes     Alcohol/week: 0.6 oz     Types: 1 Cans of beer per week     Comment: Occasional    Drug use: No    Sexual activity: Not Currently   Other Topics Concern    Not on file   Social History Narrative    Not on file       Current Outpatient Medications:     allopurinol (ZYLOPRIM) 100 MG tablet, Take 2 tablets (200 mg total) by mouth once daily., Disp: 180 tablet, Rfl: 3    amLODIPine (NORVASC) 5 MG tablet, TAKE 1 TABLET(5 MG) BY MOUTH EVERY DAY, Disp: 30 tablet, Rfl: 0    aspirin 81 MG Chew, Take 81 mg by mouth once daily., Disp: , Rfl:     benazepril (LOTENSIN) 40 MG tablet, TAKE 1 TABLET(40 MG) BY MOUTH TWICE DAILY, Disp: 60 tablet, Rfl: 0    brimonidine 0.1% (ALPHAGAN P) 0.1 % Drop, Place 1 drop into both eyes 3 (three) times daily., Disp: 15 mL, Rfl: 12    carvedilol (COREG) 6.25 MG tablet, Take 1 tablet (6.25 mg total) by mouth 2 (two) times daily., Disp: 60 tablet, Rfl: 11    ciprofloxacin HCl (CIPRO) 500 MG tablet, Take 1 tablet (500 mg total) by mouth every 12 (twelve) hours. for 14 days, Disp: 28 tablet, Rfl: 0    coenzyme Q10 100  "mg capsule, Take 100 mg by mouth every morning., Disp: , Rfl:     collagenase (SANTYL) ointment, Apply topically once daily., Disp: 14 g, Rfl: 0    dorzolamide-timolol 2-0.5% (COSOPT) 22.3-6.8 mg/mL ophthalmic solution, Place 1 drop into both eyes 3 (three) times daily., Disp: 10 mL, Rfl: 12    doxycycline (MONODOX) 100 MG capsule, Take 1 capsule (100 mg total) by mouth every 12 (twelve) hours. for 14 days, Disp: 28 capsule, Rfl: 0    ezetimibe (ZETIA) 10 mg tablet, Take 1 tablet (10 mg total) by mouth once daily., Disp: 30 tablet, Rfl: 2    fish oil-omega-3 fatty acids 300-1,000 mg capsule, Take 2 capsules (2 g total) by mouth 2 (two) times daily. (Patient taking differently: Take 1 g by mouth 2 (two) times daily. ), Disp: 120 capsule, Rfl: 5    furosemide (LASIX) 40 MG tablet, Take 1 tablet (40 mg total) by mouth 2 (two) times daily., Disp: 60 tablet, Rfl: 0    insulin glargine-lixisenatide (SOLIQUA 100/33) 100 unit-33 mcg/mL InPn, Inject 34 units once daily, Disp: 5 Syringe, Rfl: 2    ketorolac 0.5% (ACULAR) 0.5 % Drop, Place 1 drop into the right eye 3 (three) times daily., Disp: 1 Bottle, Rfl: 2    MULTIVIT,THER IRON,CA,FA & MIN (MULTIVITAMIN) Tab, Take 1 tablet by mouth every morning. , Disp: , Rfl:     pen needle, diabetic 31 gauge x 1/4" Ndle, Use as directed, Disp: 100 each, Rfl: 11    prednisoLONE acetate (PRED FORTE) 1 % DrpS, Place 1 drop into the right eye 3 (three) times daily., Disp: 1 Bottle, Rfl: 2    REVIEW OF SYSTEMS:  General: No fevers or chills; ENT: No sore throat; Allergy and Immunology: no persistent infections; Hematological and Lymphatic: No history of bleeding or easy bruising; Endocrine: negative; Respiratory: no cough, shortness of breath, or wheezing; Cardiovascular: no chest pain or dyspnea on exertion; Gastrointestinal: no abdominal pain/back, change in bowel habits, or bloody stools; Genito-Urinary: no dysuria, trouble voiding, or hematuria; Musculoskeletal: negative; " Neurological: no TIA or stroke symptoms; Psychiatric: no nervousness, anxiety or depression.    PHYSICAL EXAM:                General appearance:  Alert, well-appearing, and in no distress.  Oriented to person, place, and time                    Neurological: Normal speech, no focal findings noted; CN II - XII grossly intact. RLE with sensation to light touch, LLE with sensation to light touch.            Musculoskeletal: Digits/nail without cyanosis/clubbing.  Strength 5/5 BLE.                    Neck: Supple, no significant adenopathy                  Chest:  Clear to auscultation, no wheezes, rales or rhonchi, symmetric air entry. No use of accessory muscles               Cardiac: Normal rate and regular rhythm, S1 and S2 normal            Abdomen: Soft, nontender, nondistended, no masses or organomegaly, no hernia     No rebound tenderness noted; bowel sounds normal     No groin adenopathy      Extremities:   2+ R femoral pulse, 2+ L femoral pulse     doppler+ R popliteal pulse, doppler+ L popliteal pulse     doppler+ R PT pulse, doppler+ L PT pulse     doppler+ R DP pulse, doppler+ L DP pulse     1+ RLE edema, 2+ LLE edema    Skin: RLE with ulcer to anterior shin; LLE with extensive foot wound with eschar and granulation tissue, mild odor, no purulence or erythema    LAB RESULTS:  No results found for: CBC  Lab Results   Component Value Date    LABPROT 11.7 11/08/2018    INR 1.1 11/08/2018     Lab Results   Component Value Date     (L) 11/21/2018    K 4.3 11/21/2018     11/21/2018    CO2 22 (L) 11/21/2018     (H) 11/21/2018    BUN 35 (H) 11/21/2018    CREATININE 0.9 11/21/2018    CALCIUM 8.3 (L) 11/21/2018    ANIONGAP 7 (L) 11/21/2018    EGFRNONAA >60 11/21/2018     Lab Results   Component Value Date    WBC 10.25 11/17/2018    RBC 3.08 (L) 11/17/2018    HGB 9.7 (L) 11/17/2018    HCT 29.4 (L) 11/17/2018    MCV 96 11/17/2018    MCH 31.5 (H) 11/17/2018    MCHC 33.0 11/17/2018    RDW 17.4 (H)  11/17/2018     11/17/2018    MPV 9.1 (L) 11/17/2018    GRAN 8.5 (H) 11/17/2018    GRAN 82.8 (H) 11/17/2018    LYMPH 0.7 (L) 11/17/2018    LYMPH 6.9 (L) 11/17/2018    MONO 0.9 11/17/2018    MONO 8.6 11/17/2018    EOS 0.1 11/17/2018    BASO 0.04 11/17/2018    EOSINOPHIL 1.3 11/17/2018    BASOPHIL 0.4 11/17/2018    DIFFMETHOD Automated 11/17/2018     .  Lab Results   Component Value Date    HGBA1C 8.3 (H) 10/30/2018       IMAGING:  All pertinent imaging has been reviewed and interpreted independently.    IMP/PLAN:  58 y.o. 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, with long-term current use of insulin    Neovascular glaucoma of left eye, severe stage    Statin myopathy    Neovascular glaucoma, right eye, mild stage    Left leg cellulitis    PVD (peripheral vascular disease)    Right wrist pain    Multiple open wounds of lower leg    Hepatosplenomegaly    Stage 3 chronic kidney disease    Chronic combined systolic and diastolic heart failure    Non-pressure chronic ulcer of other part of left foot with fat layer exposed    Type 2 diabetes mellitus with left diabetic foot ulcer    Open wound of left foot    Acute renal failure superimposed on stage 3 chronic kidney disease    Hyperkalemia    Cellulitis of left lower extremity    being managed by PCP and specialists who is here today for evaluation of L foot wound.    -L foot wound slowly healed, multifactorial due to diabetes, PVD and venous insufficiency - rec washout/debridement; plan for  12/5/18  -Will obtain SIDRA/TP to further eval LLE perfusion after angioplasty 11/16/18  -Wound care   -Strict glycemic control  -Cont outpatient Abx  -Tuesday TCOMs scheduled    I spent 20 minutes evaluating this patient and greater than 50% of the time was spent counseling, coordinator care and discussing the plan of care.  All questions were answered and patient stated understanding with agreement with the above treatment plan.    Mitch Chan MD [facility]  Vascular and Endovascular Surgery

## 2018-11-29 NOTE — LETTER
November 29, 2018        Harris Pugh MD  [address]  [address]  [facility] [address]             [facility]  [address]  [facility] [address]  Phone: [phone]  Fax: [phone]   Patient: Marvin Ray   MR Number: [id number]   YOB: 1960   Date of Visit: 11/29/2018       Dear Dr. Pugh:    Thank you for referring Marvin Ray to me for evaluation. Below are the relevant portions of my assessment and plan of care.            If you have questions, please do not hesitate to call me. I look forward to following Marvin along with you.    Sincerely,      Mitch Chan MD           CC  Rubén Davis MD

## 2018-11-29 NOTE — LETTER
November 29, 2018      Harris Pugh MD  [address]  [address]  [facility] [address]           [facility]  [address]  [facility] [address]  Phone: [phone]  Fax: [phone]          Patient: Marvin Ray   MR Number: [id number]   YOB: 1960   Date of Visit: 11/29/2018       Dear Dr. Harris Pugh:    Thank you for referring Marvin Ray to me for evaluation. Attached you will find relevant portions of my assessment and plan of care.    If you have questions, please do not hesitate to call me. I look forward to following Marvin Ray along with you.    Sincerely,    Mitch Chan MD    Enclosure  CC:  No Recipients    If you would like to receive this communication electronically, please contact [email or web address] or [phone] to request more information on [employer] access.    For providers and/or their staff who would like to refer a patient to [facility], please contact us through our one-stop-shop provider referral line, [facility], at [phone].    If you feel you have received this communication in error or would no longer like to receive these types of communications, please e-mail [email or web address]

## 2018-11-29 NOTE — PROGRESS NOTES
Mitch Chan MD [facility]                         11/29/2018    HPI:  Marvin Rya is a 58 y.o. 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, with long-term current use of insulin    Neovascular glaucoma of left eye, severe stage    Statin myopathy    Neovascular glaucoma, right eye, mild stage    Left leg cellulitis    PVD (peripheral vascular disease)    Right wrist pain    Multiple open wounds of lower leg    Hepatosplenomegaly    Stage 3 chronic kidney disease    Chronic combined systolic and diastolic heart failure    Non-pressure chronic ulcer of other part of left foot with fat layer exposed    Type 2 diabetes mellitus with left diabetic foot ulcer    Open wound of left foot    Acute renal failure superimposed on stage 3 chronic kidney disease    Hyperkalemia    Cellulitis of left lower extremity    being managed by PCP and specialists who is here today for evaluation of L foot wound.  Seen in hospital last mo with LLE cellulitis.  Treated with Abx.  Also had paracentesis for ascites with negative w/u per family.  Patient states location is L foot occurring for 1-2 mo, worsening foot wound although improvement in edema and erythema.  Associated signs and symptoms include pain and edema.  Quality is aching and severity is 5/10.  Symptoms began 10/2018 spontaneously with blistering and severe edema.  Alleviating factors include wound care and elevation.  Worsening factors include  pressure.    Tobacco use: denies    Past Medical History:   Diagnosis Date    Arthritis     Cellulitis     CKD (chronic kidney disease), stage III     Coronary artery disease     Diabetes mellitus     Diabetic retinopathy     Diabetic ulcer of left foot     Glaucoma     Gout     Hyperlipemia     Hypertension     ICD (implantable cardioverter-defibrillator) in place 11/02/2018    Left chest    Non-pressure chronic ulcer of other part of left foot with fat layer exposed 10/23/2018    PVD (peripheral vascular disease)     Type 2 diabetes mellitus with left diabetic foot ulcer 10/29/2018    Unsteady gait     uses a wheelchair     Past Surgical History:   Procedure Laterality Date    AHMED GLAUCOMA IMPLANT Left 2011    DONE AT [facility]    AMPUTATION, TOES  2-5 Left 11/16/2018    Performed by Mitch Chan MD at [facility] OR    BAERVELDT GLAUCOMA IMPLANT Left 2012    WITH CATARACT EXTRACTION//DONE AT [facility]    CARDIAC CATHETERIZATION Left 05/2016    CARDIAC DEFIBRILLATOR PLACEMENT Left 11/02/2018    CATARACT EXTRACTION W/  INTRAOCULAR LENS IMPLANT Left 2012    WITH BAERVEDT//DONE AT [facility]    CATARACT EXTRACTION W/  INTRAOCULAR LENS IMPLANT Right 09/26/2018    COMPLEX ()    CB DESTRUCTION WITH CYCLO G6 Left 02/15/2017        CYST REMOVAL      HEART CATH-LEFT N/A 5/6/2016    Per[facility] at [facility] CATH LAB    INCISION AND DRAINAGE Left 11/14/2018    Procedure: Incision and Drainage, left lower extremity debridement, washout;  Surgeon: Mitch Chan MD;  Location: [facility] OR;  Service: Vascular;  Laterality: Left;    INCISION AND DRAINAGE Left 11/16/2018    Procedure: INCISION AND DRAINAGE;  Surgeon: Mitch Chan MD;  Location: [facility] OR;  Service: Vascular;  Laterality: Left;    INCISION AND DRAINAGE Left 11/16/2018    Performed by Mitch Chan MD at [facility] OR    Incision and Drainage, left lower extremity debridement, washout Left  11/14/2018    Performed by [facility] at [facility] OR    INSERTION, ICD GENERATOR, SINGLE CHAMBER N/A 11/2/2018    Performed by Pernell Greer MD at [facility] CATH LAB    INSERTION, IOL PROSTHESIS Right 9/26/2018    Performed by Perla Cortés MD at [facility] OR [address] [place] [address]    INTRAOCULAR PROSTHESES INSERTION Right 9/26/2018    Procedure: INSERTION, IOL PROSTHESIS;  Surgeon: Perla Cortés MD;  Location: [facility] OR [address] [place] [address];  Service: Ophthalmology;  Laterality: Right;    PHACOEMULSIFICATION OF CATARACT Right 9/26/2018    Procedure: PHACOEMULSIFICATION, CATARACT;  Surgeon: Perla Cortés MD;  Location: [facility] OR [address] [place] [address];  Service: Ophthalmology;  Laterality: Right;    PHACOEMULSIFICATION, CATARACT Right 9/26/2018    Performed by Perla Cortés MD at [facility] OR [address] [place] [address]    Right foot surgery  10/2014    TOE AMPUTATION Right     first and second    TOE AMPUTATION Left 11/16/2018    Procedure: AMPUTATION, TOES  2-5;  Surgeon: Mitch Chan MD;  Location: [facility] OR;  Service: Vascular;  Laterality: Left;    TONSILLECTOMY      TRABECULECTOMY/G 6 LASER Left 2/15/2017    Performed by Perla Cortés MD at [facility] OR [address] [place] [address]     Family History   Problem Relation Age of Onset    Diabetes Mother     Heart disease Brother     No Known Problems Father     No Known Problems Sister     No Known Problems Maternal Aunt     No Known Problems Maternal Uncle     No Known Problems Paternal Aunt     No Known Problems Paternal Uncle     No Known Problems Maternal Grandmother     No Known Problems Maternal Grandfather     No Known Problems Paternal Grandmother     No Known Problems Paternal Grandfather     Anemia Neg Hx     Arrhythmia Neg Hx     Asthma Neg Hx     Clotting disorder Neg Hx     Fainting Neg Hx     Heart attack Neg Hx     Heart failure Neg Hx     Hyperlipidemia Neg Hx     Hypertension Neg Hx     Stroke Neg Hx     Atrial Septal Defect Neg Hx     Amblyopia Neg Hx      Blindness Neg Hx     Glaucoma Neg Hx     Macular degeneration Neg Hx     Retinal detachment Neg Hx     Strabismus Neg Hx      Social History     Socioeconomic History    Marital status: Legally      Spouse name: Not on file    Number of children: Not on file    Years of education: 14    Highest education level: Not on file   Social Needs    Financial resource strain: Not on file    Food insecurity - worry: Not on file    Food insecurity - inability: Not on file    Transportation needs - medical: Not on file    Transportation needs - non-medical: Not on file   Occupational History    Not on file   Tobacco Use    Smoking status: Former Smoker     Packs/day: 1.00     Years: 3.00     Pack years: 3.00     Last attempt to quit: 1984     Years since quittin.4    Smokeless tobacco: Never Used   Substance and Sexual Activity    Alcohol use: Yes     Alcohol/week: 0.6 oz     Types: 1 Cans of beer per week     Comment: Occasional    Drug use: No    Sexual activity: Not Currently   Other Topics Concern    Not on file   Social History Narrative    Not on file       Current Outpatient Medications:     allopurinol (ZYLOPRIM) 100 MG tablet, Take 2 tablets (200 mg total) by mouth once daily., Disp: 180 tablet, Rfl: 3    amLODIPine (NORVASC) 5 MG tablet, TAKE 1 TABLET(5 MG) BY MOUTH EVERY DAY, Disp: 30 tablet, Rfl: 0    aspirin 81 MG Chew, Take 81 mg by mouth once daily., Disp: , Rfl:     benazepril (LOTENSIN) 40 MG tablet, TAKE 1 TABLET(40 MG) BY MOUTH TWICE DAILY, Disp: 60 tablet, Rfl: 0    brimonidine 0.1% (ALPHAGAN P) 0.1 % Drop, Place 1 drop into both eyes 3 (three) times daily., Disp: 15 mL, Rfl: 12    carvedilol (COREG) 6.25 MG tablet, Take 1 tablet (6.25 mg total) by mouth 2 (two) times daily., Disp: 60 tablet, Rfl: 11    ciprofloxacin HCl (CIPRO) 500 MG tablet, Take 1 tablet (500 mg total) by mouth every 12 (twelve) hours. for 14 days, Disp: 28 tablet, Rfl: 0    coenzyme Q10 100  "mg capsule, Take 100 mg by mouth every morning., Disp: , Rfl:     collagenase (SANTYL) ointment, Apply topically once daily., Disp: 14 g, Rfl: 0    dorzolamide-timolol 2-0.5% (COSOPT) 22.3-6.8 mg/mL ophthalmic solution, Place 1 drop into both eyes 3 (three) times daily., Disp: 10 mL, Rfl: 12    doxycycline (MONODOX) 100 MG capsule, Take 1 capsule (100 mg total) by mouth every 12 (twelve) hours. for 14 days, Disp: 28 capsule, Rfl: 0    ezetimibe (ZETIA) 10 mg tablet, Take 1 tablet (10 mg total) by mouth once daily., Disp: 30 tablet, Rfl: 2    fish oil-omega-3 fatty acids 300-1,000 mg capsule, Take 2 capsules (2 g total) by mouth 2 (two) times daily. (Patient taking differently: Take 1 g by mouth 2 (two) times daily. ), Disp: 120 capsule, Rfl: 5    furosemide (LASIX) 40 MG tablet, Take 1 tablet (40 mg total) by mouth 2 (two) times daily., Disp: 60 tablet, Rfl: 0    insulin glargine-lixisenatide (SOLIQUA 100/33) 100 unit-33 mcg/mL InPn, Inject 34 units once daily, Disp: 5 Syringe, Rfl: 2    ketorolac 0.5% (ACULAR) 0.5 % Drop, Place 1 drop into the right eye 3 (three) times daily., Disp: 1 Bottle, Rfl: 2    MULTIVIT,THER IRON,CA,FA & MIN (MULTIVITAMIN) Tab, Take 1 tablet by mouth every morning. , Disp: , Rfl:     pen needle, diabetic 31 gauge x 1/4" Ndle, Use as directed, Disp: 100 each, Rfl: 11    prednisoLONE acetate (PRED FORTE) 1 % DrpS, Place 1 drop into the right eye 3 (three) times daily., Disp: 1 Bottle, Rfl: 2    REVIEW OF SYSTEMS:  General: No fevers or chills; ENT: No sore throat; Allergy and Immunology: no persistent infections; Hematological and Lymphatic: No history of bleeding or easy bruising; Endocrine: negative; Respiratory: no cough, shortness of breath, or wheezing; Cardiovascular: no chest pain or dyspnea on exertion; Gastrointestinal: no abdominal pain/back, change in bowel habits, or bloody stools; Genito-Urinary: no dysuria, trouble voiding, or hematuria; Musculoskeletal: negative; " Neurological: no TIA or stroke symptoms; Psychiatric: no nervousness, anxiety or depression.    PHYSICAL EXAM:                General appearance:  Alert, well-appearing, and in no distress.  Oriented to person, place, and time                    Neurological: Normal speech, no focal findings noted; CN II - XII grossly intact. RLE with sensation to light touch, LLE with sensation to light touch.            Musculoskeletal: Digits/nail without cyanosis/clubbing.  Strength 5/5 BLE.                    Neck: Supple, no significant adenopathy                  Chest:  Clear to auscultation, no wheezes, rales or rhonchi, symmetric air entry. No use of accessory muscles               Cardiac: Normal rate and regular rhythm, S1 and S2 normal            Abdomen: Soft, nontender, nondistended, no masses or organomegaly, no hernia     No rebound tenderness noted; bowel sounds normal     No groin adenopathy      Extremities:   2+ R femoral pulse, 2+ L femoral pulse     doppler+ R popliteal pulse, doppler+ L popliteal pulse     doppler+ R PT pulse, doppler+ L PT pulse     doppler+ R DP pulse, doppler+ L DP pulse     1+ RLE edema, 2+ LLE edema    Skin: RLE with ulcer to anterior shin; LLE with extensive foot wound with eschar and granulation tissue, mild odor, no purulence or erythema    LAB RESULTS:  No results found for: CBC  Lab Results   Component Value Date    LABPROT 11.7 11/08/2018    INR 1.1 11/08/2018     Lab Results   Component Value Date     (L) 11/21/2018    K 4.3 11/21/2018     11/21/2018    CO2 22 (L) 11/21/2018     (H) 11/21/2018    BUN 35 (H) 11/21/2018    CREATININE 0.9 11/21/2018    CALCIUM 8.3 (L) 11/21/2018    ANIONGAP 7 (L) 11/21/2018    EGFRNONAA >60 11/21/2018     Lab Results   Component Value Date    WBC 10.25 11/17/2018    RBC 3.08 (L) 11/17/2018    HGB 9.7 (L) 11/17/2018    HCT 29.4 (L) 11/17/2018    MCV 96 11/17/2018    MCH 31.5 (H) 11/17/2018    MCHC 33.0 11/17/2018    RDW 17.4 (H)  11/17/2018     11/17/2018    MPV 9.1 (L) 11/17/2018    GRAN 8.5 (H) 11/17/2018    GRAN 82.8 (H) 11/17/2018    LYMPH 0.7 (L) 11/17/2018    LYMPH 6.9 (L) 11/17/2018    MONO 0.9 11/17/2018    MONO 8.6 11/17/2018    EOS 0.1 11/17/2018    BASO 0.04 11/17/2018    EOSINOPHIL 1.3 11/17/2018    BASOPHIL 0.4 11/17/2018    DIFFMETHOD Automated 11/17/2018     .  Lab Results   Component Value Date    HGBA1C 8.3 (H) 10/30/2018       IMAGING:  All pertinent imaging has been reviewed and interpreted independently.    IMP/PLAN:  58 y.o. 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, with long-term current use of insulin    Neovascular glaucoma of left eye, severe stage    Statin myopathy    Neovascular glaucoma, right eye, mild stage    Left leg cellulitis    PVD (peripheral vascular disease)    Right wrist pain    Multiple open wounds of lower leg    Hepatosplenomegaly    Stage 3 chronic kidney disease    Chronic combined systolic and diastolic heart failure    Non-pressure chronic ulcer of other part of left foot with fat layer exposed    Type 2 diabetes mellitus with left diabetic foot ulcer    Open wound of left foot    Acute renal failure superimposed on stage 3 chronic kidney disease    Hyperkalemia    Cellulitis of left lower extremity    being managed by PCP and specialists who is here today for evaluation of L foot wound.    -L foot wound slowly healed, multifactorial due to diabetes, PVD and venous insufficiency - rec washout/debridement; plan for  12/5/18  -Will obtain SIDRA/TP to further eval LLE perfusion after angioplasty 11/16/18  -Wound care   -Strict glycemic control  -Cont outpatient Abx  -Tuesday TCOMs scheduled    I spent 20 minutes evaluating this patient and greater than 50% of the time was spent counseling, coordinator care and discussing the plan of care.  All questions were answered and patient stated understanding with agreement with the above treatment plan.    Mitch Chan MD [facility]  Vascular and Endovascular Surgery

## 2018-11-30 ENCOUNTER — OFFICE VISIT (OUTPATIENT)
Dept: OPHTHALMOLOGY | Facility: CLINIC | Age: 58
End: 2018-11-30
Payer: COMMERCIAL

## 2018-11-30 ENCOUNTER — HOSPITAL ENCOUNTER (OUTPATIENT)
Dept: WOUND CARE | Facility: HOSPITAL | Age: 58
Discharge: HOME | End: 2018-11-30
Attending: PODIATRIST
Payer: COMMERCIAL

## 2018-11-30 ENCOUNTER — OFFICE VISIT (OUTPATIENT)
Dept: FAMILY MEDICINE | Facility: CLINIC | Age: 58
End: 2018-11-30
Attending: HOSPITALIST
Payer: COMMERCIAL

## 2018-11-30 VITALS
DIASTOLIC BLOOD PRESSURE: 66 MMHG | TEMPERATURE: 98 F | HEIGHT: 70 IN | RESPIRATION RATE: 16 BRPM | HEART RATE: 75 BPM | OXYGEN SATURATION: 92 % | SYSTOLIC BLOOD PRESSURE: 116 MMHG | BODY MASS INDEX: 35.15 KG/M2

## 2018-11-30 DIAGNOSIS — H40.52X3 NEOVASCULAR GLAUCOMA OF LEFT EYE, SEVERE STAGE: ICD-10-CM

## 2018-11-30 DIAGNOSIS — E11.3512 DIABETIC MACULAR EDEMA OF LEFT EYE WITH PROLIFERATIVE RETINOPATHY ASSOCIATED WITH TYPE 2 DIABETES MELLITUS: ICD-10-CM

## 2018-11-30 DIAGNOSIS — Z96.1 PSEUDOPHAKIA: ICD-10-CM

## 2018-11-30 DIAGNOSIS — H21.1X1 RUBEOSIS IRIDIS, RIGHT: ICD-10-CM

## 2018-11-30 DIAGNOSIS — I10 ESSENTIAL HYPERTENSION: Chronic | ICD-10-CM

## 2018-11-30 DIAGNOSIS — I25.10 CORONARY ARTERY DISEASE INVOLVING NATIVE CORONARY ARTERY OF NATIVE HEART WITHOUT ANGINA PECTORIS: Chronic | ICD-10-CM

## 2018-11-30 DIAGNOSIS — Z98.49 POSTOPERATIVE CARE FOR CATARACT: Primary | ICD-10-CM

## 2018-11-30 DIAGNOSIS — Z96.89 HISTORY OF GLAUCOMA TUBE SHUNT PROCEDURE: ICD-10-CM

## 2018-11-30 DIAGNOSIS — I50.42 CHRONIC COMBINED SYSTOLIC AND DIASTOLIC HEART FAILURE: Chronic | ICD-10-CM

## 2018-11-30 DIAGNOSIS — H40.51X1 NEOVASCULAR GLAUCOMA, RIGHT EYE, MILD STAGE: ICD-10-CM

## 2018-11-30 DIAGNOSIS — L97.522 NON-PRESSURE CHRONIC ULCER OF OTHER PART OF LEFT FOOT WITH FAT LAYER EXPOSED: Primary | ICD-10-CM

## 2018-11-30 DIAGNOSIS — E11.3511 DIABETIC MACULAR EDEMA OF RIGHT EYE WITH PROLIFERATIVE RETINOPATHY ASSOCIATED WITH TYPE 2 DIABETES MELLITUS: ICD-10-CM

## 2018-11-30 DIAGNOSIS — Z48.810 POSTOPERATIVE CARE FOR CATARACT: Primary | ICD-10-CM

## 2018-11-30 DIAGNOSIS — H35.373 EPIRETINAL MEMBRANE, BOTH EYES: ICD-10-CM

## 2018-11-30 PROCEDURE — 99999 PR PBB SHADOW E&M-EST. PATIENT-LVL II: CPT | Mod: PBBFAC,,, | Performed by: OPHTHALMOLOGY

## 2018-11-30 PROCEDURE — 99024 POSTOP FOLLOW-UP VISIT: CPT | Mod: S$GLB,,, | Performed by: OPHTHALMOLOGY

## 2018-11-30 PROCEDURE — 99213 OFFICE O/P EST LOW 20 MIN: CPT

## 2018-11-30 PROCEDURE — 99214 OFFICE O/P EST MOD 30 MIN: CPT | Mod: S$GLB,,, | Performed by: FAMILY MEDICINE

## 2018-11-30 PROCEDURE — 99999 PR PBB SHADOW E&M-EST. PATIENT-LVL III: CPT | Mod: PBBFAC,,, | Performed by: FAMILY MEDICINE

## 2018-11-30 PROCEDURE — 3008F BODY MASS INDEX DOCD: CPT | Mod: CPTII,S$GLB,, | Performed by: FAMILY MEDICINE

## 2018-11-30 PROCEDURE — 3074F SYST BP LT 130 MM HG: CPT | Mod: CPTII,S$GLB,, | Performed by: FAMILY MEDICINE

## 2018-11-30 PROCEDURE — 3078F DIAST BP <80 MM HG: CPT | Mod: CPTII,S$GLB,, | Performed by: FAMILY MEDICINE

## 2018-11-30 RX ORDER — PREDNISOLONE ACETATE 10 MG/ML
1 SUSPENSION/ DROPS OPHTHALMIC DAILY
Qty: 1 BOTTLE | Refills: 2 | Status: ON HOLD | OUTPATIENT
Start: 2018-11-30 | End: 2018-12-05

## 2018-11-30 NOTE — LETTER
December 8, 2018      Harris Pugh MD  [address]  [address]  [facility] [address]           [facility]  [address] [facility] [address]  Phone: [phone]          Patient: Marvin Ray   MR Number: [id number]   YOB: 1960   Date of Visit: 11/30/2018       Dear Dr. Harris Pugh:    Thank you for referring Marvin Ray to me for evaluation. Attached you will find relevant portions of my assessment and plan of care.    If you have questions, please do not hesitate to call me. I look forward to following Marvin Ray along with you.    Sincerely,    Rubén Davis MD    Enclosure  CC:  No Recipients    If you would like to receive this communication electronically, please contact [email or web address] or [phone] to request more information on [employer] Link access.    For providers and/or their staff who would like to refer a patient to [facility], please contact us through our one-stop-shop provider referral line, [facility] , at [phone].    If you feel you have received this communication in error or would no longer like to receive these types of communications, please e-mail [email or web address]         
[phone]

## 2018-12-03 ENCOUNTER — HOSPITAL ENCOUNTER (OUTPATIENT)
Dept: PREADMISSION TESTING | Facility: HOSPITAL | Age: 58
Discharge: HOME | End: 2018-12-03
Attending: SURGERY
Payer: COMMERCIAL

## 2018-12-03 ENCOUNTER — HOSPITAL ENCOUNTER (OUTPATIENT)
Dept: CARDIOLOGY | Facility: HOSPITAL | Age: 58
Discharge: HOME | End: 2018-12-03
Attending: SURGERY
Payer: COMMERCIAL

## 2018-12-03 VITALS
HEART RATE: 87 BPM | HEIGHT: 70 IN | DIASTOLIC BLOOD PRESSURE: 64 MMHG | WEIGHT: 245 LBS | RESPIRATION RATE: 18 BRPM | OXYGEN SATURATION: 94 % | SYSTOLIC BLOOD PRESSURE: 108 MMHG | BODY MASS INDEX: 35.07 KG/M2 | TEMPERATURE: 98 F

## 2018-12-03 DIAGNOSIS — I73.9 PVD (PERIPHERAL VASCULAR DISEASE): Primary | ICD-10-CM

## 2018-12-03 DIAGNOSIS — I73.9 PVD (PERIPHERAL VASCULAR DISEASE): ICD-10-CM

## 2018-12-03 DIAGNOSIS — Z01.818 PREOP TESTING: ICD-10-CM

## 2018-12-03 LAB
ALBUMIN SERPL BCP-MCNC: 2.1 G/DL
ALP SERPL-CCNC: 155 U/L
ALT SERPL W/O P-5'-P-CCNC: 28 U/L
ANION GAP SERPL CALC-SCNC: 7 MMOL/L
APTT BLDCRRT: 27.1 SEC
AST SERPL-CCNC: 33 U/L
BACTERIA ISLT: NORMAL
BASOPHILS # BLD AUTO: 0.04 K/UL
BASOPHILS NFR BLD: 0.6 %
BILIRUB SERPL-MCNC: 0.5 MG/DL
BUN SERPL-MCNC: 59 MG/DL
CALCIUM SERPL-MCNC: 8.6 MG/DL
CHLORIDE SERPL-SCNC: 108 MMOL/L
CO2 SERPL-SCNC: 23 MMOL/L
CREAT SERPL-MCNC: 1.1 MG/DL
DIFFERENTIAL METHOD: ABNORMAL
EOSINOPHIL # BLD AUTO: 0.3 K/UL
EOSINOPHIL NFR BLD: 3.5 %
ERYTHROCYTE [DISTWIDTH] IN BLOOD BY AUTOMATED COUNT: 17.1 %
EST. GFR  (AFRICAN AMERICAN): >60 ML/MIN/1.73 M^2
EST. GFR  (NON AFRICAN AMERICAN): >60 ML/MIN/1.73 M^2
FUNGUS SPEC CULT: NORMAL
GLUCOSE SERPL-MCNC: 122 MG/DL
HCT VFR BLD AUTO: 30 %
HGB BLD-MCNC: 9.6 G/DL
INR PPP: 1.2
LEFT ABI: 2.14
LEFT ARM BP: 110 MMHG
LEFT DORSALIS PEDIS: 255 MMHG
LEFT POSTERIOR TIBIAL: 55 MMHG
LYMPHOCYTES # BLD AUTO: 0.7 K/UL
LYMPHOCYTES NFR BLD: 9.8 %
MAGNESIUM SERPL-MCNC: 1.8 MG/DL
MCH RBC QN AUTO: 30.4 PG
MCHC RBC AUTO-ENTMCNC: 32 G/DL
MCV RBC AUTO: 95 FL
MONOCYTES # BLD AUTO: 0.6 K/UL
MONOCYTES NFR BLD: 8.6 %
NEUTROPHILS # BLD AUTO: 5.6 K/UL
NEUTROPHILS NFR BLD: 77.5 %
PHOSPHATE SERPL-MCNC: 4.1 MG/DL
PLATELET # BLD AUTO: 289 K/UL
PMV BLD AUTO: 9 FL
POTASSIUM SERPL-SCNC: 4.1 MMOL/L
PROT SERPL-MCNC: 6.9 G/DL
PROTHROMBIN TIME: 12.6 SEC
RBC # BLD AUTO: 3.16 M/UL
RIGHT ABI: 2.14
RIGHT ARM BP: 119 MMHG
RIGHT DORSALIS PEDIS: 255 MMHG
RIGHT POSTERIOR TIBIAL: 60 MMHG
SODIUM SERPL-SCNC: 138 MMOL/L
WBC # BLD AUTO: 7.22 K/UL

## 2018-12-03 PROCEDURE — 83735 ASSAY OF MAGNESIUM: CPT

## 2018-12-03 PROCEDURE — 80053 COMPREHEN METABOLIC PANEL: CPT

## 2018-12-03 PROCEDURE — 93922 UPR/L XTREMITY ART 2 LEVELS: CPT | Mod: 26,,, | Performed by: SURGERY

## 2018-12-03 PROCEDURE — 85025 COMPLETE CBC W/AUTO DIFF WBC: CPT

## 2018-12-03 PROCEDURE — 93922 UPR/L XTREMITY ART 2 LEVELS: CPT | Mod: 50

## 2018-12-03 PROCEDURE — 85610 PROTHROMBIN TIME: CPT

## 2018-12-03 PROCEDURE — 85730 THROMBOPLASTIN TIME PARTIAL: CPT

## 2018-12-03 PROCEDURE — 84100 ASSAY OF PHOSPHORUS: CPT

## 2018-12-03 PROCEDURE — 36415 COLL VENOUS BLD VENIPUNCTURE: CPT

## 2018-12-03 NOTE — DISCHARGE INSTRUCTIONS
"Your procedure  is scheduled for __12/5/2018________.    Call [phone] between 2pm and 5pm on _12/4/2018______to find out your arrival time for the day of surgery.    Report to Same Day Surgery Unit at ____ AM on the 2nd floor of the hospital.  Use the front entrance of the hospital.  The front doors of the hospital open promptly at 5:30am.  If you need wheelchair assistance, call [phone] from your cell phone, or call "0" from the courtesy phone in the lobby.    Important instructions:   Do not eat or drink after 12 midnight, including water.  It is okay to brush your teeth.  Do not have gum, candy or mints.    Take your morning medications with small sips of water.    Do not take any diabetic medication on the morning of surgery unless instructed to do so by your doctor or pre op nurse.    Stop taking Aspirin, Ibuprofen, Motrin and Aleve , Fish oil, and Vitamin E for at least 7 days before your surgery. You may use Tylenol unless otherwise instructed by your doctor.         Please shower the night before and the morning of your surgery.     You may wear deodorant only.      Do not wear powder, body lotion or perfume/cologne.     Do not wear any jewelry or have any metal on your body.     You will be asked to remove any dentures or partials for the procedure.     Please bring any documents given to you by your doctor.     If you are going home on the same day of surgery, you must arrange for a family member or a friend to drive you home.  Public transportation is prohibited.  You will not be able to drive home if you were given anesthesia or sedation.     Wear loose fitting clothes allowing for bandages.     Please leave money and valuables home.       You may bring your cell phone.     Call the doctor if fever or illness should occur before your surgery.    Call [phone] to contact us here if needed.  "

## 2018-12-04 ENCOUNTER — OFFICE VISIT (OUTPATIENT)
Dept: CARDIOLOGY | Facility: CLINIC | Age: 58
End: 2018-12-04
Payer: COMMERCIAL

## 2018-12-04 ENCOUNTER — HOSPITAL ENCOUNTER (OUTPATIENT)
Dept: WOUND CARE | Facility: HOSPITAL | Age: 58
Discharge: HOME | End: 2018-12-04
Attending: FAMILY MEDICINE
Payer: COMMERCIAL

## 2018-12-04 VITALS
BODY MASS INDEX: 35.11 KG/M2 | OXYGEN SATURATION: 90 % | WEIGHT: 244.69 LBS | SYSTOLIC BLOOD PRESSURE: 122 MMHG | DIASTOLIC BLOOD PRESSURE: 64 MMHG | HEART RATE: 100 BPM

## 2018-12-04 DIAGNOSIS — I10 ESSENTIAL HYPERTENSION: ICD-10-CM

## 2018-12-04 DIAGNOSIS — E11.42 DM TYPE 2 WITH DIABETIC PERIPHERAL NEUROPATHY: ICD-10-CM

## 2018-12-04 DIAGNOSIS — I73.9 PVD (PERIPHERAL VASCULAR DISEASE): ICD-10-CM

## 2018-12-04 DIAGNOSIS — L97.522 NON-PRESSURE CHRONIC ULCER OF OTHER PART OF LEFT FOOT WITH FAT LAYER EXPOSED: Primary | ICD-10-CM

## 2018-12-04 DIAGNOSIS — I50.42 CHRONIC COMBINED SYSTOLIC AND DIASTOLIC HEART FAILURE: ICD-10-CM

## 2018-12-04 DIAGNOSIS — E78.2 MIXED HYPERLIPIDEMIA: ICD-10-CM

## 2018-12-04 DIAGNOSIS — N18.30 STAGE 3 CHRONIC KIDNEY DISEASE: ICD-10-CM

## 2018-12-04 DIAGNOSIS — I25.10 CORONARY ARTERY DISEASE INVOLVING NATIVE CORONARY ARTERY OF NATIVE HEART WITHOUT ANGINA PECTORIS: Primary | ICD-10-CM

## 2018-12-04 PROCEDURE — 3045F PR MOST RECENT HEMOGLOBIN A1C LEVEL 7.0-9.0%: CPT | Mod: CPTII,S$GLB,, | Performed by: INTERNAL MEDICINE

## 2018-12-04 PROCEDURE — 93922 UPR/L XTREMITY ART 2 LEVELS: CPT | Mod: 26,,, | Performed by: FAMILY MEDICINE

## 2018-12-04 PROCEDURE — 93289 INTERROG DEVICE EVAL HEART: CPT | Mod: 26,,, | Performed by: INTERNAL MEDICINE

## 2018-12-04 PROCEDURE — 3008F BODY MASS INDEX DOCD: CPT | Mod: CPTII,S$GLB,, | Performed by: INTERNAL MEDICINE

## 2018-12-04 PROCEDURE — 99999 PR PBB SHADOW E&M-EST. PATIENT-LVL III: CPT | Mod: PBBFAC,,, | Performed by: INTERNAL MEDICINE

## 2018-12-04 PROCEDURE — 93922 UPR/L XTREMITY ART 2 LEVELS: CPT | Performed by: FAMILY MEDICINE

## 2018-12-04 PROCEDURE — 99212 OFFICE O/P EST SF 10 MIN: CPT | Performed by: FAMILY MEDICINE

## 2018-12-04 PROCEDURE — 99024 POSTOP FOLLOW-UP VISIT: CPT | Mod: S$GLB,,, | Performed by: INTERNAL MEDICINE

## 2018-12-04 PROCEDURE — 3078F DIAST BP <80 MM HG: CPT | Mod: CPTII,S$GLB,, | Performed by: INTERNAL MEDICINE

## 2018-12-04 PROCEDURE — 3074F SYST BP LT 130 MM HG: CPT | Mod: CPTII,S$GLB,, | Performed by: INTERNAL MEDICINE

## 2018-12-04 NOTE — PROGRESS NOTES
Subjective:    Patient ID:  Marvin Ray is a 58 y.o. male who presents for follow-up of Follow-up ([facility]  Here for follow-up of systolic heart failure.  He says that he feels like he is retaining fluid.  Does have significant swelling to his abdomen and thighs.  He says he does set up on the side of the bed feeling short of breath at times.  He denies any PND orthopnea but lower extremity swelling mostly around his bilateral foot wounds.  He denies any dizziness, presyncope or syncope.    Review of Systems   Constitution: Negative.   HENT: Negative.    Eyes: Negative.    Cardiovascular: Positive for leg swelling. Negative for chest pain, dyspnea on exertion, irregular heartbeat, near-syncope, orthopnea, palpitations, paroxysmal nocturnal dyspnea and syncope.   Respiratory: Negative for shortness of breath.    Skin: Positive for poor wound healing.   Musculoskeletal: Negative.    Gastrointestinal: Negative for abdominal pain, constipation and diarrhea.   Genitourinary: Negative for dysuria.   Neurological: Negative for dizziness.   Psychiatric/Behavioral: Negative.         Objective:    Physical Exam   Constitutional: He is oriented to person, place, and time. He appears well-developed and well-nourished. No distress.   HENT:   Head: Normocephalic and atraumatic.   Eyes: Conjunctivae and EOM are normal. Pupils are equal, round, and reactive to light.   Neck: Normal range of motion. Neck supple. No thyromegaly present.   Cardiovascular: Normal rate, regular rhythm and normal heart sounds.   No murmur heard.  Pulmonary/Chest: Effort normal and breath sounds normal. No respiratory distress. He has no wheezes. He has no rales. He exhibits no tenderness.   Abdominal: Soft. Bowel sounds are normal.   Musculoskeletal: He exhibits no edema.   Neurological: He is alert and oriented to person, place, and time.   Skin: Skin is warm and dry.   Psychiatric: He has a normal mood and affect. His behavior is  normal.         Echo:  10-18  · Left ventricle ejection fraction is severely decreased at 25%  · Grade III (severe) left ventricular diastolic dysfunction consistent with restrictive physiology.  · LA pressure is elevated.  · Right ventricular cavity size is moderately dilated.  · Left atrium is severely dilated.  · The estimated PA systolic pressure is 59.85 mm Hg  · Pulmonary hypertension present.     NST:  · Abnormal myocardial perfusion study  · There is a moderate amount of infarct in the inferior wall(s).In the typical distribution of the RCA territory.  · Post Stress Ejection Fraction is 17 %     LHC:  B. Summary/Post-Operative Diagnosis       Three vessel coronary artery disease.    LV systolic and diastolic dysfunction.     E. Angiographic Results     Diagnostic:          Patient has a right dominant coronary artery.        - Left Main Coronary Artery:             The LM has luminal irregularities. There is BAKARI 3 flow.     - Left Anterior Descending Artery:             The proximal LAD has a 80% stenosis. There is BAKARI 3 flow.             The mid LAD has a 90% stenosis. There is BAKARI 3 flow. The remaining portion of the vessel is diffusely diseased.     - Left Circumflex Artery:             The mid LCX has a 60% stenosis. There is BAKARI 3 flow. The remaining portion of the vessel has luminal irregularities.     - Right Coronary Artery:             The proximal RCA has a 60% stenosis. There is BAKARI 3 flow.             The mid RCA has a 80% stenosis. There is BAKARI 3 flow. The remaining portion of the vessel has luminal irregularities.     - Posterior Descending Artery:             The proximal PDA has a 60% stenosis. There is BAKARI 3 flow. The remaining portion of the vessel has luminal irregularities.     - Radial:             The radial.  Assessment:       1. Coronary artery disease involving native coronary artery of native heart without angina pectoris    2. Chronic combined systolic and diastolic heart  failure    3. DM type 2 with diabetic peripheral neuropathy    4. Essential hypertension    5. Mixed hyperlipidemia    6. PVD (peripheral vascular disease)    7. 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 edema, with long-term current use of insulin    8. Stage 3 chronic kidney disease         Plan:       -continue med therapy  -status post [facility] ICD  -limb ischemia followed by vascular  -follow-up with wound care clinic     Return to clinic in 3 months with device check        [facility] ICD check:    V paced less than 1%  RV:  1.75 volts at 0.5 milliseconds, 5.4 mV, 350 Ohms    No episodes  No changes    Return to clinic in 3 months

## 2018-12-05 ENCOUNTER — ANESTHESIA (OUTPATIENT)
Dept: SURGERY | Facility: HOSPITAL | Age: 58
End: 2018-12-05
Payer: COMMERCIAL

## 2018-12-05 ENCOUNTER — ANESTHESIA EVENT (OUTPATIENT)
Dept: SURGERY | Facility: HOSPITAL | Age: 58
End: 2018-12-05
Payer: COMMERCIAL

## 2018-12-05 ENCOUNTER — HOSPITAL ENCOUNTER (OUTPATIENT)
Facility: HOSPITAL | Age: 58
Discharge: HOME | End: 2018-12-05
Attending: SURGERY | Admitting: SURGERY
Payer: COMMERCIAL

## 2018-12-05 VITALS
HEART RATE: 69 BPM | OXYGEN SATURATION: 93 % | BODY MASS INDEX: 35.07 KG/M2 | RESPIRATION RATE: 16 BRPM | WEIGHT: 245 LBS | DIASTOLIC BLOOD PRESSURE: 63 MMHG | TEMPERATURE: 98 F | HEIGHT: 70 IN | SYSTOLIC BLOOD PRESSURE: 99 MMHG

## 2018-12-05 DIAGNOSIS — I73.9 PVD (PERIPHERAL VASCULAR DISEASE): Primary | ICD-10-CM

## 2018-12-05 LAB — POCT GLUCOSE: 96 MG/DL (ref 70–110)

## 2018-12-05 PROCEDURE — D9220A PRA ANESTHESIA: Mod: CRNA,,, | Performed by: NURSE ANESTHETIST, CERTIFIED REGISTERED

## 2018-12-05 PROCEDURE — 25000003 PHARM REV CODE 250: Performed by: ANESTHESIOLOGY

## 2018-12-05 PROCEDURE — 36000706: Performed by: SURGERY

## 2018-12-05 PROCEDURE — 71000016 HC POSTOP RECOV ADDL HR: Performed by: SURGERY

## 2018-12-05 PROCEDURE — 36000707: Performed by: SURGERY

## 2018-12-05 PROCEDURE — S0020 INJECTION, BUPIVICAINE HYDRO: HCPCS | Performed by: ANESTHESIOLOGY

## 2018-12-05 PROCEDURE — 63600175 PHARM REV CODE 636 W HCPCS: Performed by: NURSE ANESTHETIST, CERTIFIED REGISTERED

## 2018-12-05 PROCEDURE — 37000008 HC ANESTHESIA 1ST 15 MINUTES: Performed by: SURGERY

## 2018-12-05 PROCEDURE — 88305 TISSUE EXAM BY PATHOLOGIST: CPT | Mod: 26,,, | Performed by: PATHOLOGY

## 2018-12-05 PROCEDURE — 76942 ECHO GUIDE FOR BIOPSY: CPT | Performed by: ANESTHESIOLOGY

## 2018-12-05 PROCEDURE — 25000003 PHARM REV CODE 250: Performed by: NURSE ANESTHETIST, CERTIFIED REGISTERED

## 2018-12-05 PROCEDURE — 11044 DBRDMT BONE 1ST 20 SQ CM/<: CPT | Mod: 78,51,, | Performed by: SURGERY

## 2018-12-05 PROCEDURE — S0077 INJECTION, CLINDAMYCIN PHOSP: HCPCS | Performed by: SURGERY

## 2018-12-05 PROCEDURE — D9220A PRA ANESTHESIA: Mod: ANES,,, | Performed by: ANESTHESIOLOGY

## 2018-12-05 PROCEDURE — 27200750 HC INSULATED NEEDLE/ STIMUPLEX: Performed by: ANESTHESIOLOGY

## 2018-12-05 PROCEDURE — 71000033 HC RECOVERY, INTIAL HOUR: Performed by: SURGERY

## 2018-12-05 PROCEDURE — 71000015 HC POSTOP RECOV 1ST HR: Performed by: SURGERY

## 2018-12-05 PROCEDURE — 11047 DBRDMT BONE EACH ADDL: CPT | Mod: ,,, | Performed by: SURGERY

## 2018-12-05 PROCEDURE — 37000009 HC ANESTHESIA EA ADD 15 MINS: Performed by: SURGERY

## 2018-12-05 PROCEDURE — 88311 DECALCIFY TISSUE: CPT | Mod: 26,,, | Performed by: PATHOLOGY

## 2018-12-05 PROCEDURE — 25000003 PHARM REV CODE 250: Performed by: SURGERY

## 2018-12-05 PROCEDURE — 88311 DECALCIFY TISSUE: CPT | Performed by: PATHOLOGY

## 2018-12-05 PROCEDURE — 87070 CULTURE OTHR SPECIMN AEROBIC: CPT

## 2018-12-05 PROCEDURE — 28810 AMPUTATION TOE & METATARSAL: CPT | Mod: 58,TA,, | Performed by: SURGERY

## 2018-12-05 PROCEDURE — 87186 SC STD MICRODIL/AGAR DIL: CPT

## 2018-12-05 PROCEDURE — 87205 SMEAR GRAM STAIN: CPT

## 2018-12-05 PROCEDURE — 87077 CULTURE AEROBIC IDENTIFY: CPT

## 2018-12-05 RX ORDER — OXYCODONE AND ACETAMINOPHEN 5; 325 MG/1; MG/1
1 TABLET ORAL
Status: DISCONTINUED | OUTPATIENT
Start: 2018-12-05 | End: 2018-12-05 | Stop reason: HOSPADM

## 2018-12-05 RX ORDER — ONDANSETRON 2 MG/ML
INJECTION INTRAMUSCULAR; INTRAVENOUS
Status: DISCONTINUED | OUTPATIENT
Start: 2018-12-05 | End: 2018-12-05

## 2018-12-05 RX ORDER — MORPHINE SULFATE 4 MG/ML
2 INJECTION, SOLUTION INTRAMUSCULAR; INTRAVENOUS EVERY 5 MIN PRN
Status: DISCONTINUED | OUTPATIENT
Start: 2018-12-05 | End: 2018-12-05 | Stop reason: HOSPADM

## 2018-12-05 RX ORDER — LIDOCAINE HYDROCHLORIDE 10 MG/ML
1 INJECTION, SOLUTION EPIDURAL; INFILTRATION; INTRACAUDAL; PERINEURAL ONCE
Status: DISCONTINUED | OUTPATIENT
Start: 2018-12-05 | End: 2018-12-05 | Stop reason: HOSPADM

## 2018-12-05 RX ORDER — CLINDAMYCIN PHOSPHATE 900 MG/50ML
900 INJECTION, SOLUTION INTRAVENOUS
Status: COMPLETED | OUTPATIENT
Start: 2018-12-05 | End: 2018-12-05

## 2018-12-05 RX ORDER — BUPIVACAINE HYDROCHLORIDE 5 MG/ML
INJECTION, SOLUTION EPIDURAL; INTRACAUDAL
Status: COMPLETED | OUTPATIENT
Start: 2018-12-05 | End: 2018-12-05

## 2018-12-05 RX ORDER — BACITRACIN 50000 [IU]/1
INJECTION, POWDER, FOR SOLUTION INTRAMUSCULAR
Status: DISCONTINUED | OUTPATIENT
Start: 2018-12-05 | End: 2018-12-05 | Stop reason: HOSPADM

## 2018-12-05 RX ORDER — MEPERIDINE HYDROCHLORIDE 50 MG/ML
12.5 INJECTION INTRAMUSCULAR; INTRAVENOUS; SUBCUTANEOUS ONCE AS NEEDED
Status: DISCONTINUED | OUTPATIENT
Start: 2018-12-05 | End: 2018-12-05 | Stop reason: HOSPADM

## 2018-12-05 RX ORDER — LIDOCAINE HCL/PF 100 MG/5ML
SYRINGE (ML) INTRAVENOUS
Status: DISCONTINUED | OUTPATIENT
Start: 2018-12-05 | End: 2018-12-05

## 2018-12-05 RX ORDER — PROPOFOL 10 MG/ML
VIAL (ML) INTRAVENOUS CONTINUOUS PRN
Status: DISCONTINUED | OUTPATIENT
Start: 2018-12-05 | End: 2018-12-05

## 2018-12-05 RX ORDER — EPHEDRINE SULFATE 50 MG/ML
INJECTION, SOLUTION INTRAVENOUS
Status: DISCONTINUED | OUTPATIENT
Start: 2018-12-05 | End: 2018-12-05

## 2018-12-05 RX ORDER — OXYCODONE AND ACETAMINOPHEN 5; 325 MG/1; MG/1
1 TABLET ORAL EVERY 6 HOURS PRN
Qty: 30 TABLET | Refills: 0 | Status: ON HOLD | OUTPATIENT
Start: 2018-12-05 | End: 2018-12-19 | Stop reason: SDUPTHER

## 2018-12-05 RX ORDER — METOCLOPRAMIDE HYDROCHLORIDE 5 MG/ML
10 INJECTION INTRAMUSCULAR; INTRAVENOUS EVERY 10 MIN PRN
Status: DISCONTINUED | OUTPATIENT
Start: 2018-12-05 | End: 2018-12-05 | Stop reason: HOSPADM

## 2018-12-05 RX ORDER — HYDROMORPHONE HYDROCHLORIDE 2 MG/ML
0.2 INJECTION, SOLUTION INTRAMUSCULAR; INTRAVENOUS; SUBCUTANEOUS EVERY 5 MIN PRN
Status: DISCONTINUED | OUTPATIENT
Start: 2018-12-05 | End: 2018-12-05 | Stop reason: HOSPADM

## 2018-12-05 RX ORDER — PHENYLEPHRINE HYDROCHLORIDE 10 MG/ML
INJECTION INTRAVENOUS
Status: DISCONTINUED | OUTPATIENT
Start: 2018-12-05 | End: 2018-12-05

## 2018-12-05 RX ORDER — SODIUM CHLORIDE 0.9 % (FLUSH) 0.9 %
3 SYRINGE (ML) INJECTION
Status: DISCONTINUED | OUTPATIENT
Start: 2018-12-05 | End: 2018-12-05 | Stop reason: HOSPADM

## 2018-12-05 RX ORDER — SODIUM CHLORIDE, SODIUM LACTATE, POTASSIUM CHLORIDE, CALCIUM CHLORIDE 600; 310; 30; 20 MG/100ML; MG/100ML; MG/100ML; MG/100ML
INJECTION, SOLUTION INTRAVENOUS CONTINUOUS
Status: DISCONTINUED | OUTPATIENT
Start: 2018-12-05 | End: 2018-12-05 | Stop reason: HOSPADM

## 2018-12-05 RX ADMIN — BUPIVACAINE HYDROCHLORIDE 20 ML: 5 INJECTION, SOLUTION EPIDURAL; INTRACAUDAL; PERINEURAL at 09:12

## 2018-12-05 RX ADMIN — LIDOCAINE HYDROCHLORIDE 100 MG: 20 INJECTION, SOLUTION INTRAVENOUS at 10:12

## 2018-12-05 RX ADMIN — BUPIVACAINE HYDROCHLORIDE 30 ML: 5 INJECTION, SOLUTION EPIDURAL; INTRACAUDAL; PERINEURAL at 09:12

## 2018-12-05 RX ADMIN — SODIUM CHLORIDE, SODIUM LACTATE, POTASSIUM CHLORIDE, AND CALCIUM CHLORIDE 10 ML/HR: .6; .31; .03; .02 INJECTION, SOLUTION INTRAVENOUS at 09:12

## 2018-12-05 RX ADMIN — PHENYLEPHRINE HYDROCHLORIDE 400 MCG: 10 INJECTION INTRAVENOUS at 11:12

## 2018-12-05 RX ADMIN — PROPOFOL 50 MCG/KG/MIN: 10 INJECTION, EMULSION INTRAVENOUS at 10:12

## 2018-12-05 RX ADMIN — CLINDAMYCIN PHOSPHATE 900 MG: 18 INJECTION, SOLUTION INTRAVENOUS at 10:12

## 2018-12-05 RX ADMIN — PHENYLEPHRINE HYDROCHLORIDE 200 MCG: 10 INJECTION INTRAVENOUS at 10:12

## 2018-12-05 RX ADMIN — EPHEDRINE SULFATE 25 MG: 50 INJECTION, SOLUTION INTRAMUSCULAR; INTRAVENOUS; SUBCUTANEOUS at 10:12

## 2018-12-05 RX ADMIN — EPHEDRINE SULFATE 10 MG: 50 INJECTION, SOLUTION INTRAMUSCULAR; INTRAVENOUS; SUBCUTANEOUS at 11:12

## 2018-12-05 RX ADMIN — EPHEDRINE SULFATE 15 MG: 50 INJECTION, SOLUTION INTRAMUSCULAR; INTRAVENOUS; SUBCUTANEOUS at 11:12

## 2018-12-05 RX ADMIN — PHENYLEPHRINE HYDROCHLORIDE 200 MCG: 10 INJECTION INTRAVENOUS at 11:12

## 2018-12-05 RX ADMIN — ONDANSETRON 4 MG: 2 INJECTION, SOLUTION INTRAMUSCULAR; INTRAVENOUS at 10:12

## 2018-12-05 RX ADMIN — SODIUM CHLORIDE, SODIUM LACTATE, POTASSIUM CHLORIDE, AND CALCIUM CHLORIDE: .6; .31; .03; .02 INJECTION, SOLUTION INTRAVENOUS at 09:12

## 2018-12-05 NOTE — ANESTHESIA PREPROCEDURE EVALUATION
12/05/2018  Marvin Ray is a 58 y.o., male.    Pre-op Assessment    I have reviewed the Patient Summary Reports.     I have reviewed the Nursing Notes.   I have reviewed the Medications.     Review of Systems  Anesthesia Hx:  No problems with previous Anesthesia Denies Hx of Anesthetic complications  Neg history of prior surgery. Denies Family Hx of Anesthesia complications.   Denies Personal Hx of Anesthesia complications.   Social:  Former Smoker, Alcohol Use    Hematology/Oncology:  Hematology Normal   Oncology Normal     EENT/Dental:EENT/Dental Normal   Cardiovascular:   Exercise tolerance: good Pacemaker Hypertension CAD   PVD hyperlipidemia CONCLUSIONS     1 - Moderately depressed left ventricular systolic function (EF 35-40%).     2 - Quantitatively measured LV function is 38%.     3 - Left ventricular diastolic dysfunction.     4 - Mild mitral regurgitation.     5 - Severe left atrial enlargement.     6 - Pulmonary hypertension. The estimated PA systolic pressure is 54 mmHg.     7 - Mild pulmonic regurgitation.     8 - Mild tricuspid regurgitation.     9 - Right ventricular enlargement with normal systolic function.     10 - Small pericardial effusion.     11 - Intermediate central venous pressure.       Peripheral vascular disease    Cardiac defibrillator in place   Pulmonary:  Pulmonary Normal    Renal/:   Chronic Renal Disease, CRI    Hepatic/GI:   Liver Disease,    Musculoskeletal:  Musculoskeletal Normal    Neurological:   Neuromuscular Disease,    Endocrine:   Diabetes, type 2 Hgb a1c -8.2%   Dermatological:  Skin Normal    Psych:  Psychiatric Normal           Physical Exam  General:  Morbid Obesity    Airway/Jaw/Neck:  AIRWAY FINDINGS: Normal       Dental:  DENTAL FINDINGS: Normal   Chest/Lungs:  Chest/Lungs Clear    Heart/Vascular:  Heart Findings: Normal Heart murmur: negative        Mental Status:  Mental Status Findings:  Cooperative, Alert and Oriented         Anesthesia Plan  Type of Anesthesia, risks & benefits discussed:  Anesthesia Type:  general, regional  Patient's Preference:   Intra-op Monitoring Plan: standard ASA monitors  Intra-op Monitoring Plan Comments:   Post Op Pain Control Plan: per primary service following discharge from PACU, IV/PO Opioids PRN and multimodal analgesia  Post Op Pain Control Plan Comments:   Induction:   IV  Beta Blocker:  Patient is on a Beta-Blocker and has received one dose within the past 24 hours (No further documentation required).       Informed Consent: Patient understands risks and agrees with Anesthesia plan.  Questions answered. Anesthesia consent signed with patient.  ASA Score: 3     Day of Surgery Review of History & Physical:    H&P update referred to the surgeon.         Ready For Surgery From Anesthesia Perspective.

## 2018-12-05 NOTE — ANESTHESIA PROCEDURE NOTES
Left popliteal block    Patient location during procedure: pre-op    Reason for block: primary anesthetic   Diagnosis: left foot infection   Start time: 12/5/2018 9:56 AM  Timeout: 12/5/2018 9:49 AM   End time: 12/5/2018 9:58 AM  Staffing  Anesthesiologist: Donny Keita MD  Performed: anesthesiologist   Preanesthetic Checklist  Completed: patient identified, site marked, surgical consent, pre-op evaluation, timeout performed, IV checked, risks and benefits discussed and monitors and equipment checked  Peripheral Block  Patient position: prone  Prep: ChloraPrep  Patient monitoring: heart rate, cardiac monitor, continuous pulse ox and frequent blood pressure checks  Block type: popliteal  Laterality: left  Injection technique: single shot  Needle  Needle type: StimupNor1   Needle gauge: 21 G  Needle length: 4 in  Needle localization: anatomical landmarks and ultrasound guidance   -ultrasound image captured on disc.  Assessment  Injection assessment: negative aspiration, negative parasthesia and local visualized surrounding nerve  Paresthesia pain: none  Heart rate change: no  Slow fractionated injection: yes  Additional Notes  Bupivacaine 0.5% + PF Decadron 4mg/30ml

## 2018-12-05 NOTE — ANESTHESIA POSTPROCEDURE EVALUATION
"Anesthesia Post Evaluation    Patient: Marvin Ray    Procedure(s) Performed: Procedure(s) (LRB):  Exam under anesthesia, left foot debridement, washout and all other indicated procedures (Left)  AMPUTATION, TOE (Left)    Final Anesthesia Type: regional  Patient location during evaluation: PACU  Patient participation: Yes- Able to Participate  Level of consciousness: awake and alert and oriented  Post-procedure vital signs: reviewed and not stable  Pain management: adequate  Airway patency: patent  PONV status at discharge: No PONV  Anesthetic complications: no      Cardiovascular status: blood pressure returned to baseline  Respiratory status: unassisted, spontaneous ventilation and room air  Hydration status: euvolemic  Follow-up not needed.        Visit Vitals  BP (!) 95/59   Pulse 62   Temp 36.8 °C (98.2 °F) (Oral)   Resp 12   Ht 5' 10" (1.778 m)   Wt 111.1 kg (245 lb)   SpO2 96%   BMI 35.15 kg/m²       Pain/Ralf Score: Pain Assessment Performed: Yes (12/5/2018 12:26 PM)  Presence of Pain: denies (12/5/2018 12:26 PM)  Ralf Score: 10 (12/5/2018 12:10 PM)        "

## 2018-12-05 NOTE — ANESTHESIA PROCEDURE NOTES
Left adductor canal block    Patient location during procedure: pre-op    Reason for block: primary anesthetic   Diagnosis: left foot infection   Start time: 12/5/2018 9:50 AM  Timeout: 12/5/2018 9:49 AM   End time: 12/5/2018 9:52 AM  Staffing  Anesthesiologist: Donny Keita MD  Performed: anesthesiologist   Preanesthetic Checklist  Completed: patient identified, site marked, surgical consent, pre-op evaluation, timeout performed, IV checked, risks and benefits discussed and monitors and equipment checked  Peripheral Block  Patient position: supine  Prep: ChloraPrep  Patient monitoring: heart rate, cardiac monitor, continuous pulse ox, continuous capnometry and frequent blood pressure checks  Block type: adductor canal  Laterality: right  Injection technique: single shot  Needle  Needle type: Stimuplex   Needle gauge: 21 G  Needle length: 4 in  Needle localization: anatomical landmarks and ultrasound guidance   -ultrasound image captured on disc.  Assessment  Injection assessment: negative aspiration, negative parasthesia and local visualized surrounding nerve  Paresthesia pain: none  Heart rate change: no  Slow fractionated injection: yes  Additional Notes  VSS.  [facility] RN monitoring vitals throughout procedure.  Patient tolerated procedure well.

## 2018-12-05 NOTE — DISCHARGE INSTRUCTIONS
DIET: You may resume your home diet. If nausea is present, increase your diet gradually with fluids and bland foods    ACTIVITY LEVEL: You have received sedation or an anesthetic, you may feel sleepy for several hours. Rest until you are more awake. Gradually resume your normal activities    Medications: Pain medication should be taken only if needed and as directed. If antibiotics are prescribed, the medication should be taken until completed. You will be given an updated list of you medications.    No driving, alcoholic beverages or signing legal documents for next 24 hours or while taking pain medication.       CALL THE DOCTOR:    For any obvious bleeding (some dried blood over the incision is normal).      Redness, swelling, foul smell around incision or fever over 101.   Shortness of breath, Coughing up Bloody sputum, Pains or Swelling in your Calves .   Persistent pain or nausea not relieved by medication.    If any unusual problems or difficulties occur contact your doctor. If you cannot contact your doctor but feel your signs and symptoms warrant a physicians attention return to the emergency room.  Fall Prevention  Millions of people fall every year and injure themselves. You may have had anesthesia or sedation which may increase your risk of falling. You may have health issues that put you at an increased risk of falling.     Here are ways to reduce your risk of falling.  ·   · Make your home safe by keeping walkways clear of objects you may trip over.  · Use non-slip pads under rugs. Do not use area rugs or small throw rugs.  · Use non-slip mats in bathtubs and showers.  · Install handrails and lights on staircases.  · Do not walk in poorly lit areas.  · Do not stand on chairs or wobbly ladders.  · Use caution when reaching overhead or looking upward. This position can cause a loss of balance.  · Be sure your shoes fit properly, have non-slip bottoms and are in good condition.   · Wear shoes both  inside and out. Avoid going barefoot or wearing slippers.  · Be cautious when going up and down stairs, curbs, and when walking on uneven sidewalks.  · If your balance is poor, consider using a cane or walker.  · If your fall was related to alcohol use, stop or limit alcohol intake.   · If your fall was related to use of sleeping medicines, talk to your doctor about this. You may need to reduce your dosage at bedtime if you awaken during the night to go to the bathroom.    · To reduce the need for nighttime bathroom trips:  ¨ Avoid drinking fluids for several hours before going to bed  ¨ Empty your bladder before going to bed  ¨ Men can keep a urinal at the bedside  · Stay as active as you can. Balance, flexibility, strength, and endurance all come from exercise. They all play a role in preventing falls. Ask your healthcare provider which types of activity are right for you.  · Get your vision checked on a regular basis.  · If you have pets, know where they are before you stand up or walk so you don't trip over them.  · Use night lights.

## 2018-12-05 NOTE — BRIEF OP NOTE
[facility]  Brief Operative Note     SUMMARY     Surgery Date: 12/5/2018     Surgeon(s) and Role:     * Mitch Chan MD - Primary    Assisting Surgeon: None    Pre-op Diagnosis:  PVD (peripheral vascular disease) [I73.9]  Atherosclerosis of native artery of left lower extremity with ulceration of midfoot [I70.244]    Post-op Diagnosis:  Post-Op Diagnosis Codes:     * PVD (peripheral vascular disease) [I73.9]     * Atherosclerosis of native artery of left lower extremity with ulceration of midfoot [I70.244]    Procedure:  1. Left foot wound debridement, 25 x 18 x 1 cm  2. Left foot wound washout out,  25 x 18 x 1 cm  3. Left great toe amputation including metatarsal head    Anesthesia: Regional    Description of the findings of the procedure: large left foot wound with minimal granulation tissue    Findings/Key Components: no infection, debrided wound with brisk bleeding    Estimated Blood Loss: 10cc         Specimens:   Specimen (12h ago, onward)    Start     Ordered    12/05/18 1055  Specimen to Pathology - Surgery  Once     Comments:  Left great toe     Start Status   12/05/18 1055 Collected (12/05/18 1055)       12/05/18 1054          Discharge Note    SUMMARY     Admit Date: 12/5/2018    Discharge Date and Time:  12/05/2018 11:39 AM    Hospital Course (synopsis of major diagnoses, care, treatment, and services provided during the course of the hospital stay): No new issues or acute events postoperatively.        Final Diagnosis: Post-Op Diagnosis Codes:     * PVD (peripheral vascular disease) [I73.9]     * Atherosclerosis of native artery of left lower extremity with ulceration of midfoot [I70.244]    Disposition: Home or Self Care    Follow Up/Patient Instructions: Resume diabetic diet, follow-up in 2-3 weeks, resume regular activity in 2-3 days.    Resume medications per post-procedure med reconciliation.      Medications:  Reconciled Home Medications:      Medication List       CHANGE how you take these medications    fish oil-omega-3 fatty acids 300-1,000 mg capsule  Take 2 capsules (2 g total) by mouth 2 (two) times daily.  What changed:  how much to take        CONTINUE taking these medications    allopurinol 100 MG tablet  Commonly known as:  ZYLOPRIM  Take 2 tablets (200 mg total) by mouth once daily.     amLODIPine 5 MG tablet  Commonly known as:  NORVASC  TAKE 1 TABLET(5 MG) BY MOUTH EVERY DAY     aspirin 81 MG Chew  Take 81 mg by mouth once daily.     benazepril 40 MG tablet  Commonly known as:  LOTENSIN  TAKE 1 TABLET(40 MG) BY MOUTH TWICE DAILY     brimonidine 0.1% 0.1 % Drop  Commonly known as:  ALPHAGAN P  Place 1 drop into both eyes 3 (three) times daily.     carvedilol 6.25 MG tablet  Commonly known as:  COREG  Take 1 tablet (6.25 mg total) by mouth 2 (two) times daily.     ciprofloxacin HCl 500 MG tablet  Commonly known as:  CIPRO  Take 1 tablet (500 mg total) by mouth every 12 (twelve) hours. for 14 days     coenzyme Q10 100 mg capsule  Take 100 mg by mouth every morning.     collagenase ointment  Commonly known as:  SANTYL  Apply topically once daily.     dorzolamide-timolol 2-0.5% 22.3-6.8 mg/mL ophthalmic solution  Commonly known as:  COSOPT  Place 1 drop into both eyes 3 (three) times daily.     doxycycline 100 MG capsule  Commonly known as:  MONODOX  Take 1 capsule (100 mg total) by mouth every 12 (twelve) hours. for 14 days     ezetimibe 10 mg tablet  Commonly known as:  ZETIA  Take 1 tablet (10 mg total) by mouth once daily.     furosemide 40 MG tablet  Commonly known as:  LASIX  Take 1 tablet (40 mg total) by mouth 2 (two) times daily.     insulin glargine-lixisenatide 100 unit-33 mcg/mL Inpn  Commonly known as:  SOLIQUA 100/33  Inject 34 units once daily     ketorolac 0.5% 0.5 % Drop  Commonly known as:  ACULAR  Place 1 drop into the right eye 3 (three) times daily.     multivitamin Tab  Take 1 tablet by mouth every morning.     pen needle, diabetic 31 gauge x  "1/4" Ndle  Use as directed          Discharge Procedure Orders   Diet diabetic     Notify your health care provider if you experience any of the following:  temperature >100.4     Notify your health care provider if you experience any of the following:  persistent nausea and vomiting or diarrhea     Notify your health care provider if you experience any of the following:  severe uncontrolled pain     Notify your health care provider if you experience any of the following:  redness, tenderness, or signs of infection (pain, swelling, redness, odor or green/yellow discharge around incision site)     Notify your health care provider if you experience any of the following:  difficulty breathing or increased cough     Notify your health care provider if you experience any of the following:  severe persistent headache     Notify your health care provider if you experience any of the following:  worsening rash     Notify your health care provider if you experience any of the following:  persistent dizziness, light-headedness, or visual disturbances     Notify your health care provider if you experience any of the following:  increased confusion or weakness     Remove dressing in 24 hours     Activity as tolerated     Shower on day dressing removed (No bath)     Follow-up Information     Mitch Chan MD.    Specialties:  Cardiology, Vascular Surgery, Surgery  Why:  For wound re-check  Contact information:  [address]  [address]  [facility] [address]  [phone]                 "

## 2018-12-05 NOTE — OP NOTE
DATE OF PROCEDURE:  12/05/2018.    SURGEON:  Mitch Chan M.D.    ASSISTANT:  None.    PREOPERATIVE DIAGNOSES:  1.  Left lower extremity atherosclerosis with left foot wound including necrosis   of underlying bone.  2.  Hypertension.  3.  Hyperlipidemia.  4.  Uncontrolled diabetes mellitus.  5.  Coronary artery disease.  6.  Chronic kidney disease.  7.  History of left lower extremity cellulitis.  8.  Arrhythmia, requiring an ICD.    POSTOPERATIVE DIAGNOSES:  1.  Left lower extremity atherosclerosis with left foot wound including necrosis   of underlying bone.  2.  Hypertension.  3.  Hyperlipidemia.  4.  Uncontrolled diabetes mellitus.  5.  Coronary artery disease.  6.  Chronic kidney disease.  7.  History of left lower extremity cellulitis.  8.  Arrhythmia, requiring an ICD.    PROCEDURES PERFORMED:  1.  Left foot wound debridement, 25 x 18 x 1 cm.  2.  Left foot wound washout, 25 x 18 x 1 cm.  3.  Left great toe amputation including metatarsal head.    INDICATIONS FOR PROCEDURE:  This is a 58-year-old white male with the above   listed medical history, who underwent left lower extremity endovascular   revascularization on 11/13/2018 including a left SFA, AT and peroneal   angioplasty to improve perfusion to his left foot due to extensive left foot   wound.  On 11/14/2018, he underwent left foot wound debridement and washout.  On   11/16/2018, he underwent further debridement, washout of his left foot wound   with second through five left toe amputations including metatarsal heads.  He   was continued on antibiotics and local [facility] and   with his podiatrist.  He was seen in followup in my clinic on 11/29/2018 with   minimal improvement in his wound healing.  Debridement was performed in the   clinic, although the patient was not able to tolerate due to pain.  It was   determined that the patient would benefit from washout, debridement, possible   left great toe amputation in the  Operating Room.  After risks and benefits were   discussed with the patient, the patient stated understanding and desired to   proceed with surgery.    DESCRIPTION OF PROCEDURE:  The patient was seen by Anesthesia preoperatively and   was deemed stable for surgery.  Vital signs were stable and without complaints.    He was brought to the Operating Room, placed supine on the operating room   table.  Antibiotics were given by anesthesia.  He was prepped and draped in   sterile fashion after regional block was performed of the left ankle per   Anesthesia.  The entire left foot wound was then debrided with a scalpel with   good bleeding to the tissues.  There was no infection noted.  All fibrinous   tissue and slough was removed and excised with the scalpel. I debrided down to level of the bone.  Next, the left great toe was removed at the MTP joint and the metatarsal head was rongeured back.  There was no deep infection and good bleeding to surrounding tissues.  Hemostasis was achieved with electrocautery and manual pressure.  The wound was then washed out with bacitracin-soaked saline and Pulsavac.  Sterile dressing with bacitracin-soaked saline wet-to-dry dressing was applied to the left foot wound and the patient was then transferred to stretcher and subsequently Recovery Room in stable condition.    COMPLICATIONS:  None.    ANESTHESIA:  Regional left ankle block.    ESTIMATED BLOOD LOSS:  10 mL.      CCL/HN  dd: 12/05/2018 11:44:56 (CST)  td: 12/05/2018 13:07:16 (CST)  Doc ID   #[id number]  Job ID #[id number]    CC:

## 2018-12-05 NOTE — INTERVAL H&P NOTE
The patient has been examined and the H&P has been reviewed:    I concur with the findings and no changes have occurred since H&P was written.    Anesthesia/Surgery risks, benefits and alternative options discussed and understood by patient/family.          Active Hospital Problems    Diagnosis  POA    PVD (peripheral vascular disease) [I73.9]  Yes      Resolved Hospital Problems   No resolved problems to display.

## 2018-12-05 NOTE — TRANSFER OF CARE
"Anesthesia Transfer of Care Note    Patient: Marvin Ray    Procedure(s) Performed: Procedure(s) (LRB):  Exam under anesthesia, left foot debridement, washout and all other indicated procedures (Left)  AMPUTATION, TOE (Left)    Patient location: PACU    Anesthesia Type: regional and MAC    Transport from OR: Transported from OR on room air with adequate spontaneous ventilation    Post pain: adequate analgesia    Post assessment: no apparent anesthetic complications    Post vital signs: stable    Level of consciousness: awake    Nausea/Vomiting: no nausea/vomiting    Complications: none    Transfer of care protocol was followed      Last vitals:   Visit Vitals  /88 (BP Location: Left arm, Patient Position: Lying)   Pulse 65   Temp 36.8 °C (98.2 °F) (Oral)   Resp 16   Ht 5' 10" (1.778 m)   Wt 111.1 kg (245 lb)   SpO2 95%   BMI 35.15 kg/m²     "

## 2018-12-05 NOTE — CONSULTS
Received phone call from from Dr. Chan requesting assistance with getting dressings for home. Family reporting home health not sending dressings for daily wound care. Patient to follow up with Dr. Chan in one week.  S/P Debridement of left foot wound and amputation of left foot great toe including metatarsal head 12/5/18 per Dr. Chan  Called sister, Chloé, and discussed wound care products. Reinforced use of NS dressings over Santyl and dry dressings on top of NS moistened gauze. Chloé is changing dressings when home health doesn't visit and when dry dressings become saturated with drainage.   Aquacel Ag not ordered with current treatment. Patient has received all dressings needed from [facility].   Sister reports only getting 2 tubes of Santyl from [facility]. According to measurements of wound given to [facility] upon discharge from acute care, patient should have been approved for more than 2 tubes of Santyl. Sister to inquire about Santyl.   Reviewed dressing changes with sister and questions answered.

## 2018-12-06 ENCOUNTER — TELEPHONE (OUTPATIENT)
Dept: FAMILY MEDICINE | Facility: CLINIC | Age: 58
End: 2018-12-06

## 2018-12-06 NOTE — TELEPHONE ENCOUNTER
----- Message from Mónica Hunt RN sent at 12/6/2018 11:59 AM CST -----  Contact: sister Chloé [phone]  Ms. Luevano called about refilling her brother's antibiotic. Dr. Chan stated that he has some cultures noted in epic and would like Dr. Davis to manage his antibiotic coverage if that is possible. Thanks.  Mónica

## 2018-12-07 NOTE — TELEPHONE ENCOUNTER
Patient's sister called about patient's cultures done by Dr. Chan. Per sister, Dr. Chan is requesting PCP to manage Antibiotic therapy for patient. Please advise.

## 2018-12-07 NOTE — TELEPHONE ENCOUNTER
----- Message from Madeline Herring sent at 12/7/2018 11:34 AM CST -----  Contact: sister   Sister is rt a call. Please call at [phone].

## 2018-12-08 LAB
BACTERIA THROAT CULT: NORMAL
GRAM STN SPEC: NORMAL
GRAM STN SPEC: NORMAL

## 2018-12-08 NOTE — PROGRESS NOTES
Routine Office Visit    Patient Name: Marvin Ray    : 1960  MRN: [id number]    Subjective:  Marvin is a 58 y.o. male who presents today for:    1. Foot ulcer  Patient presenting today with newly acquired foot ulcer that encompasses majority of his left foot.  His sister states that it was originally smaller, but that while in the hospital the dressings were not changed like she felt they should and it got overly wet.  He is being followed by vascular surgery and states they are trying to preserve the foot.  There is no pain at this time.  His sister states that they have had trouble getting home health to come out and change the dressings.  He is taking all medications as prescribed.        Past Medical History  Past Medical History:   Diagnosis Date    Arthritis     Cellulitis     CKD (chronic kidney disease), stage III     Coronary artery disease     Diabetes mellitus     Diabetic retinopathy     Diabetic ulcer of left foot     Glaucoma     Gout     Hyperlipemia     Hypertension     ICD (implantable cardioverter-defibrillator) in place 2018    Left chest    Non-pressure chronic ulcer of other part of left foot with fat layer exposed 10/23/2018    PVD (peripheral vascular disease)     Type 2 diabetes mellitus with left diabetic foot ulcer 10/29/2018    Unsteady gait     uses a wheelchair       Past Surgical History  Past Surgical History:   Procedure Laterality Date    AHMED GLAUCOMA IMPLANT Left     DONE AT [facility]    AMPUTATION, TOE Left 2018    [facility] OR    AMPUTATION, TOES  2-5 Left 2018    [facility] at [facility] OR    BAERVELDT GLAUCOMA IMPLANT Left     WITH CATARACT EXTRACTION//DONE AT [facility]    CARDIAC CATHETERIZATION Left 2016    CARDIAC DEFIBRILLATOR PLACEMENT Left 2018    CATARACT EXTRACTION W/  INTRAOCULAR LENS IMPLANT Left     WITH BAERVEDT//DONE AT [facility]    CATARACT  EXTRACTION W/  INTRAOCULAR LENS IMPLANT Right 09/26/2018    COMPLEX ()    CB DESTRUCTION WITH CYCLO G6 Left 02/15/2017        CYST REMOVAL      Exam under anesthesia, left foot debridement, washout and all other indicated procedures Left 12/5/2018    [facility] OR    EXAMINATION UNDER ANESTHESIA Left 12/5/2018    Procedure: Exam under anesthesia, left foot debridement, washout and all other indicated procedures;  Surgeon: Mitch Chan MD;  Location: [facility] OR;  Service: Vascular;  Laterality: Left;  1030AM START  RN PREOP 12/3/2018-----AICD  SEEN BY DR GREER 12/4    HEART CATH-LEFT N/A 5/6/2016    Performed by Mike Magana MD at [facility] CATH LAB    INCISION AND DRAINAGE Left 11/14/2018    Procedure: Incision and Drainage, left lower extremity debridement, washout;  Surgeon: Mitch Chan MD;  Location: [facility] OR;  Service: Vascular;  Laterality: Left;    INCISION AND DRAINAGE Left 11/16/2018    Procedure: INCISION AND DRAINAGE;  Surgeon: Mitch Chan MD;  Location: [facility] OR;  Service: Vascular;  Laterality: Left;    INCISION AND DRAINAGE Left 11/16/2018    Per[facility] by Mitch [facility] at [facility] OR    Incision and Drainage, left lower extremity debridement, washout Left 11/14/2018    Per[facility] by Mitch [facility] at [facility] OR    INSERTION, ICD GENERATOR, SINGLE CHAMBER N/A 11/2/2018    Performed by Pernell Greer MD at [facility] CATH LAB    INSERTION, IOL PROSTHESIS Right 9/26/2018    [facility]    INTRAOCULAR PROSTHESES INSERTION Right 9/26/2018    Procedure: INSERTION, IOL PROSTHESIS;  Surgeon: Perla Cortés MD;  Location: [facility] OR [address] [place] [address];  Service: Ophthalmology;  Laterality: Right;    PHACOEMULSIFICATION OF CATARACT Right 9/26/2018    Procedure: PHACOEMULSIFICATION, CATARACT;  Surgeon: Perla Cortés MD;  [facility];  Service: Ophthalmology;   Laterality: Right;    PHACOEMULSIFICATION, CATARACT Right 9/26/2018    Performed by [facility] at [facility] OR 1ST FLR    Right foot surgery  10/2014    TOE AMPUTATION Right     first and second    TOE AMPUTATION Left 11/16/2018    Procedure: AMPUTATION, TOES  2-5;  Surgeon: Mitch Chan MD;  Location: [facility] OR;  Service: Vascular;  Laterality: Left;    TOE AMPUTATION Left 12/5/2018    Procedure: AMPUTATION, TOE;  Surgeon: Mitch Chan MD;  Location: [facility] OR;  Service: Vascular;  Laterality: Left;  left great toe    TONSILLECTOMY      TRABECULECTOMY/G 6 LASER Left 2/15/2017    Performed by Perla Cortés [facility] at [facility] OR 1ST FLR       Family History  Family History   Problem Relation Age of Onset    Diabetes Mother     Heart disease Brother     No Known Problems Father     No Known Problems Sister     No Known Problems Maternal Aunt     No Known Problems Maternal Uncle     No Known Problems Paternal Aunt     No Known Problems Paternal Uncle     No Known Problems Maternal Grandmother     No Known Problems Maternal Grandfather     No Known Problems Paternal Grandmother     No Known Problems Paternal Grandfather     Anemia Neg Hx     Arrhythmia Neg Hx     Asthma Neg Hx     Clotting disorder Neg Hx     Fainting Neg Hx     Heart attack Neg Hx     Heart failure Neg Hx     Hyperlipidemia Neg Hx     Hypertension Neg Hx     Stroke Neg Hx     Atrial Septal Defect Neg Hx     Amblyopia Neg Hx     Blindness Neg Hx     Glaucoma Neg Hx     Macular degeneration Neg Hx     Retinal detachment Neg Hx     Strabismus Neg Hx        Social History  Social History     Socioeconomic History    Marital status: Legally      Spouse name: Not on file    Number of children: Not on file    Years of education: 14    Highest education level: Not on file   Social Needs    Financial resource strain: Not on file    Food insecurity - worry: Not on file    Food insecurity -  inability: Not on file    Transportation needs - medical: Not on file    Transportation needs - non-medical: Not on file   Occupational History    Not on file   Tobacco Use    Smoking status: Former Smoker     Packs/day: 1.00     Years: 3.00     Pack years: 3.00     Last attempt to quit: 1984     Years since quittin.4    Smokeless tobacco: Never Used   Substance and Sexual Activity    Alcohol use: Yes     Alcohol/week: 0.6 oz     Types: 1 Cans of beer per week     Comment: Occasional    Drug use: No    Sexual activity: Not Currently   Other Topics Concern    Not on file   Social History Narrative    Not on file       Current Medications  Current Outpatient Medications on File Prior to Visit   Medication Sig Dispense Refill    allopurinol (ZYLOPRIM) 100 MG tablet Take 2 tablets (200 mg total) by mouth once daily. 180 tablet 3    amLODIPine (NORVASC) 5 MG tablet TAKE 1 TABLET(5 MG) BY MOUTH EVERY DAY 30 tablet 0    aspirin 81 MG Chew Take 81 mg by mouth once daily.      benazepril (LOTENSIN) 40 MG tablet TAKE 1 TABLET(40 MG) BY MOUTH TWICE DAILY 60 tablet 0    carvedilol (COREG) 6.25 MG tablet Take 1 tablet (6.25 mg total) by mouth 2 (two) times daily. 60 tablet 11    coenzyme Q10 100 mg capsule Take 100 mg by mouth every morning.      collagenase (SANTYL) ointment Apply topically once daily. 14 g 0    dorzolamide-timolol 2-0.5% (COSOPT) 22.3-6.8 mg/mL ophthalmic solution Place 1 drop into both eyes 3 (three) times daily. 10 mL 12    ezetimibe (ZETIA) 10 mg tablet Take 1 tablet (10 mg total) by mouth once daily. 30 tablet 2    fish oil-omega-3 fatty acids 300-1,000 mg capsule Take 2 capsules (2 g total) by mouth 2 (two) times daily. (Patient taking differently: Take 1 g by mouth 2 (two) times daily. ) 120 capsule 5    furosemide (LASIX) 40 MG tablet Take 1 tablet (40 mg total) by mouth 2 (two) times daily. 60 tablet 0    insulin glargine-lixisenatide (SOLIQUA 100/33) 100 unit-33 mcg/mL  "InPn Inject 34 units once daily 5 Syringe 2    ketorolac 0.5% (ACULAR) 0.5 % Drop Place 1 drop into the right eye 3 (three) times daily. 1 Bottle 2    MULTIVIT,THER IRON,CA,FA & MIN (MULTIVITAMIN) Tab Take 1 tablet by mouth every morning.       pen needle, diabetic 31 gauge x 1/4" Ndle Use as directed 100 each 11    brimonidine 0.1% (ALPHAGAN P) 0.1 % Drop Place 1 drop into both eyes 3 (three) times daily. 15 mL 12    oxyCODONE-acetaminophen (PERCOCET) 5-325 mg per tablet Take 1 tablet by mouth every 6 (six) hours as needed for Pain. 30 tablet 0     No current facility-administered medications on file prior to visit.        Allergies   Review of patient's allergies indicates:   Allergen Reactions    Statins-hmg-coa reductase inhibitors      Generalized Pain    Onglyza [saxagliptin]     Penicillins Rash       Review of Systems (Pertinent positives)  Review of Systems   Constitutional: Negative.    HENT: Negative.    Eyes: Negative.    Respiratory: Negative for cough, sputum production and shortness of breath.    Cardiovascular: Positive for leg swelling. Negative for chest pain.   Gastrointestinal: Negative.    Skin: Negative.          /66 (BP Location: Right arm, Patient Position: Sitting, BP Method: Medium (Manual))   Pulse 75   Temp 98.1 °F (36.7 °C) (Oral)   Resp 16   Ht 5' 10" (1.778 m)   SpO2 (!) 92%   BMI 35.15 kg/m²     GENERAL APPEARANCE: in no apparent distress and well developed and well nourished  HEENT: PERRL, EOMI, Sclera clear, anicteric, Oropharynx clear, no lesions, Neck supple with midline trachea  NECK: normal, supple, no adenopathy, thyroid normal in size  RESPIRATORY: appears well, vitals normal, no respiratory distress, acyanotic, normal RR, chest clear, no wheezing, crepitations, rhonchi, normal symmetric air entry  HEART: regular rate and rhythm, S1, S2 normal, no murmur, click, rub or gallop.    ABDOMEN: abdomen is soft without tenderness, no masses, no hernias, no " organomegaly, no rebound, no guarding. Suprapubic tenderness absent. No CVA tenderness.  NEUROLOGIC: normal without focal findings, CN II-XII are intact.   Extremities: warm/well perfused.  No abnormal hair patterns.  2+ edema in bilateral lower extremities  SKIN: extensive wound noted to left foot with slough covering the entire wound.    PSYCH: Alert, oriented x 3, thought content appropriate, speech normal, pleasant and cooperative, good eye contact, well groomed    Assessment/Plan:  Marvin Ray is a 58 y.o. male who presents today for :    Marvin was seen today for follow-up.    Diagnoses and all orders for this visit:    Non-pressure chronic ulcer of other part of left foot with fat layer exposed    Coronary artery disease involving native coronary artery of native heart without angina pectoris    Chronic combined systolic and diastolic heart failure    Essential hypertension    Other orders  -     Cancel: (In Office Administered) Tdap Vaccine  -     Cancel: (In Office Administered) Pneumococcal Conjugate Vaccine (13 Valent) (IM)      1.  Referred to wound care for eval of wound  2.  Patient not likely an HBO candidate due to current EF  3.  Will continue to see vascular and will get Tcom to see how well perfused the lower extremity is  4.  Patient scheduled to see podiatry at [place] to have wound evaluated as well      Rubén Davis MD

## 2018-12-09 ENCOUNTER — TELEPHONE (OUTPATIENT)
Dept: FAMILY MEDICINE | Facility: CLINIC | Age: 58
End: 2018-12-09

## 2018-12-09 ENCOUNTER — NURSE TRIAGE (OUTPATIENT)
Dept: ADMINISTRATIVE | Facility: CLINIC | Age: 58
End: 2018-12-09

## 2018-12-09 NOTE — TELEPHONE ENCOUNTER
S/w JILLIAN Lomax in the ER, informed her of pt coming in for firs dose of iv Meropenem, gave her Dr. Griffin's cell # to call if any questions.   Order put in, awaiting Dr. Griffin to sign it.

## 2018-12-09 NOTE — TELEPHONE ENCOUNTER
Provided Jignesh ADAIR to Dr. Griffin, she will contact them to order iv abx.  Informed pt 's wife of the above.

## 2018-12-09 NOTE — TELEPHONE ENCOUNTER
"S/w Dr. Griffin, [facility] Med provider on call.  Reviewed culture results, lab results, and allergies with her.  Confirmed with pt's spouse that they are on service with Jignesh ADAIR, phone # [phone].  She will contact the pharmacy to discuss dosing/medication for cellulitis vs osteo.    Reason for Disposition   [1] Request for URGENT new prescription or refill of "essential" medication (i.e., likelihood of harm to patient if not taken) AND [2] triager unable to fill per unit policy    Answer Assessment - Initial Assessment Questions  1. SYMPTOMS: "Do you have any symptoms?"      Extensive foot wound, surgeon requested Dr. Davis to address the positive     cx and manage abx, but nothing called in to the pharmacy.  2. SEVERITY: If symptoms are present, ask "Are they mild, moderate or severe?"      na    Protocols used: ST MEDICATION QUESTION CALL-A-AH      "

## 2018-12-09 NOTE — TELEPHONE ENCOUNTER
Received call from Dr. Neena Griffin, she needs pt to get first dose of iv abx in the ER, she has tried repeatedly to get through to them without success.  She requested I call them and inform that pt will be coming in for first dose of iv abx, also requests that I put in the order for Meropenem 1G iv x 1 dose, and that I send a message to Dr. Davis informing him that if he wants any abx to continue, that he will need to write orders for PICC placement, and continuation of IV abx.

## 2018-12-10 ENCOUNTER — TELEPHONE (OUTPATIENT)
Dept: FAMILY MEDICINE | Facility: CLINIC | Age: 58
End: 2018-12-10

## 2018-12-10 ENCOUNTER — DOCUMENTATION ONLY (OUTPATIENT)
Dept: FAMILY MEDICINE | Facility: CLINIC | Age: 58
End: 2018-12-10

## 2018-12-10 ENCOUNTER — HOSPITAL ENCOUNTER (INPATIENT)
Facility: HOSPITAL | Age: 58
LOS: 9 days | Discharge: NURSING, REHAB OR LONG-TERM CARE | DRG: 622 | End: 2018-12-20
Attending: EMERGENCY MEDICINE | Admitting: INTERNAL MEDICINE
Payer: COMMERCIAL

## 2018-12-10 DIAGNOSIS — I46.9 CARDIAC ARREST: ICD-10-CM

## 2018-12-10 DIAGNOSIS — L03.116 CELLULITIS OF LEFT LOWER EXTREMITY: Primary | ICD-10-CM

## 2018-12-10 DIAGNOSIS — E11.621 TYPE 2 DIABETES MELLITUS WITH LEFT DIABETIC FOOT ULCER: Primary | ICD-10-CM

## 2018-12-10 DIAGNOSIS — E11.621 TYPE 2 DIABETES MELLITUS WITH LEFT DIABETIC FOOT ULCER: ICD-10-CM

## 2018-12-10 DIAGNOSIS — L97.529 TYPE 2 DIABETES MELLITUS WITH LEFT DIABETIC FOOT ULCER: Primary | ICD-10-CM

## 2018-12-10 DIAGNOSIS — S91.302D OPEN WOUND OF LEFT FOOT, SUBSEQUENT ENCOUNTER: ICD-10-CM

## 2018-12-10 DIAGNOSIS — L97.529 TYPE 2 DIABETES MELLITUS WITH LEFT DIABETIC FOOT ULCER: ICD-10-CM

## 2018-12-10 DIAGNOSIS — L08.9 FOOT INFECTION: ICD-10-CM

## 2018-12-10 LAB
ANION GAP SERPL CALC-SCNC: 7 MMOL/L
APTT BLDCRRT: 27.3 SEC
BASOPHILS # BLD AUTO: 0.03 K/UL
BASOPHILS NFR BLD: 0.4 %
BUN SERPL-MCNC: 56 MG/DL
CALCIUM SERPL-MCNC: 8.7 MG/DL
CHLORIDE SERPL-SCNC: 106 MMOL/L
CO2 SERPL-SCNC: 26 MMOL/L
CREAT SERPL-MCNC: 1.1 MG/DL
DIFFERENTIAL METHOD: ABNORMAL
EOSINOPHIL # BLD AUTO: 0.2 K/UL
EOSINOPHIL NFR BLD: 2.1 %
ERYTHROCYTE [DISTWIDTH] IN BLOOD BY AUTOMATED COUNT: 16.8 %
EST. GFR  (AFRICAN AMERICAN): >60 ML/MIN/1.73 M^2
EST. GFR  (NON AFRICAN AMERICAN): >60 ML/MIN/1.73 M^2
GLUCOSE SERPL-MCNC: 74 MG/DL
HCT VFR BLD AUTO: 28.2 %
HGB BLD-MCNC: 9.2 G/DL
INR PPP: 1.1
LYMPHOCYTES # BLD AUTO: 0.5 K/UL
LYMPHOCYTES NFR BLD: 7.4 %
MCH RBC QN AUTO: 30.6 PG
MCHC RBC AUTO-ENTMCNC: 32.6 G/DL
MCV RBC AUTO: 94 FL
MONOCYTES # BLD AUTO: 0.7 K/UL
MONOCYTES NFR BLD: 9.5 %
NEUTROPHILS # BLD AUTO: 5.8 K/UL
NEUTROPHILS NFR BLD: 80.6 %
PLATELET # BLD AUTO: 328 K/UL
PMV BLD AUTO: 8.6 FL
POCT GLUCOSE: 68 MG/DL (ref 70–110)
POCT GLUCOSE: 88 MG/DL (ref 70–110)
POTASSIUM SERPL-SCNC: 4 MMOL/L
PROTHROMBIN TIME: 12 SEC
RBC # BLD AUTO: 3.01 M/UL
SODIUM SERPL-SCNC: 139 MMOL/L
WBC # BLD AUTO: 7.18 K/UL

## 2018-12-10 PROCEDURE — 25000003 PHARM REV CODE 250: Performed by: EMERGENCY MEDICINE

## 2018-12-10 PROCEDURE — G0378 HOSPITAL OBSERVATION PER HR: HCPCS

## 2018-12-10 PROCEDURE — 85025 COMPLETE CBC W/AUTO DIFF WBC: CPT

## 2018-12-10 PROCEDURE — 94761 N-INVAS EAR/PLS OXIMETRY MLT: CPT

## 2018-12-10 PROCEDURE — 36569 INSJ PICC 5 YR+ W/O IMAGING: CPT

## 2018-12-10 PROCEDURE — 25000003 PHARM REV CODE 250: Performed by: HOSPITALIST

## 2018-12-10 PROCEDURE — 85610 PROTHROMBIN TIME: CPT

## 2018-12-10 PROCEDURE — 80048 BASIC METABOLIC PNL TOTAL CA: CPT

## 2018-12-10 PROCEDURE — 96365 THER/PROPH/DIAG IV INF INIT: CPT

## 2018-12-10 PROCEDURE — 02HV33Z INSERTION OF INFUSION DEVICE INTO SUPERIOR VENA CAVA, PERCUTANEOUS APPROACH: ICD-10-PCS | Performed by: INTERNAL MEDICINE

## 2018-12-10 PROCEDURE — 85730 THROMBOPLASTIN TIME PARTIAL: CPT

## 2018-12-10 PROCEDURE — 99285 EMERGENCY DEPT VISIT HI MDM: CPT | Mod: 25

## 2018-12-10 PROCEDURE — 63600175 PHARM REV CODE 636 W HCPCS: Performed by: HOSPITALIST

## 2018-12-10 PROCEDURE — 63600175 PHARM REV CODE 636 W HCPCS: Performed by: EMERGENCY MEDICINE

## 2018-12-10 RX ORDER — SODIUM CHLORIDE 0.9 % (FLUSH) 0.9 %
10 SYRINGE (ML) INJECTION
Status: DISCONTINUED | OUTPATIENT
Start: 2018-12-10 | End: 2018-12-20 | Stop reason: HOSPADM

## 2018-12-10 RX ORDER — SODIUM CHLORIDE 0.9 % (FLUSH) 0.9 %
10 SYRINGE (ML) INJECTION EVERY 6 HOURS
Status: DISCONTINUED | OUTPATIENT
Start: 2018-12-11 | End: 2018-12-20 | Stop reason: HOSPADM

## 2018-12-10 RX ORDER — EZETIMIBE 10 MG/1
10 TABLET ORAL DAILY
Status: DISCONTINUED | OUTPATIENT
Start: 2018-12-11 | End: 2018-12-20 | Stop reason: HOSPADM

## 2018-12-10 RX ORDER — FUROSEMIDE 40 MG/1
40 TABLET ORAL 2 TIMES DAILY
Status: DISCONTINUED | OUTPATIENT
Start: 2018-12-10 | End: 2018-12-10

## 2018-12-10 RX ORDER — MEROPENEM AND SODIUM CHLORIDE 1 G/50ML
1 INJECTION, SOLUTION INTRAVENOUS
Status: COMPLETED | OUTPATIENT
Start: 2018-12-10 | End: 2018-12-11

## 2018-12-10 RX ORDER — RAMELTEON 8 MG/1
8 TABLET ORAL NIGHTLY PRN
Status: DISCONTINUED | OUTPATIENT
Start: 2018-12-10 | End: 2018-12-20 | Stop reason: HOSPADM

## 2018-12-10 RX ORDER — FUROSEMIDE 40 MG/1
40 TABLET ORAL 2 TIMES DAILY
Status: DISCONTINUED | OUTPATIENT
Start: 2018-12-10 | End: 2018-12-11

## 2018-12-10 RX ORDER — CARVEDILOL 6.25 MG/1
6.25 TABLET ORAL 2 TIMES DAILY
Status: DISCONTINUED | OUTPATIENT
Start: 2018-12-10 | End: 2018-12-11

## 2018-12-10 RX ORDER — GLUCAGON 1 MG
1 KIT INJECTION
Status: DISCONTINUED | OUTPATIENT
Start: 2018-12-10 | End: 2018-12-20 | Stop reason: HOSPADM

## 2018-12-10 RX ORDER — NAPROXEN SODIUM 220 MG/1
81 TABLET, FILM COATED ORAL DAILY
Status: DISCONTINUED | OUTPATIENT
Start: 2018-12-11 | End: 2018-12-20 | Stop reason: HOSPADM

## 2018-12-10 RX ORDER — ENOXAPARIN SODIUM 100 MG/ML
40 INJECTION SUBCUTANEOUS EVERY 24 HOURS
Status: DISCONTINUED | OUTPATIENT
Start: 2018-12-10 | End: 2018-12-11

## 2018-12-10 RX ORDER — ONDANSETRON 2 MG/ML
4 INJECTION INTRAMUSCULAR; INTRAVENOUS EVERY 6 HOURS PRN
Status: DISCONTINUED | OUTPATIENT
Start: 2018-12-10 | End: 2018-12-20 | Stop reason: HOSPADM

## 2018-12-10 RX ORDER — ALLOPURINOL 100 MG/1
200 TABLET ORAL DAILY
Status: DISCONTINUED | OUTPATIENT
Start: 2018-12-11 | End: 2018-12-19

## 2018-12-10 RX ORDER — AMLODIPINE BESYLATE 5 MG/1
5 TABLET ORAL DAILY
Status: DISCONTINUED | OUTPATIENT
Start: 2018-12-11 | End: 2018-12-11

## 2018-12-10 RX ORDER — ACETAMINOPHEN 325 MG/1
650 TABLET ORAL EVERY 6 HOURS PRN
Status: DISCONTINUED | OUTPATIENT
Start: 2018-12-10 | End: 2018-12-20 | Stop reason: HOSPADM

## 2018-12-10 RX ORDER — GLUCOSAM/CHONDRO/HERB 149/HYAL 750-100 MG
1 TABLET ORAL 2 TIMES DAILY
Status: DISCONTINUED | OUTPATIENT
Start: 2018-12-10 | End: 2018-12-20 | Stop reason: HOSPADM

## 2018-12-10 RX ORDER — IBUPROFEN 200 MG
24 TABLET ORAL
Status: DISCONTINUED | OUTPATIENT
Start: 2018-12-10 | End: 2018-12-20 | Stop reason: HOSPADM

## 2018-12-10 RX ORDER — INSULIN ASPART 100 [IU]/ML
1-10 INJECTION, SOLUTION INTRAVENOUS; SUBCUTANEOUS
Status: DISCONTINUED | OUTPATIENT
Start: 2018-12-10 | End: 2018-12-20 | Stop reason: HOSPADM

## 2018-12-10 RX ORDER — OXYCODONE AND ACETAMINOPHEN 5; 325 MG/1; MG/1
1 TABLET ORAL EVERY 6 HOURS PRN
Status: DISCONTINUED | OUTPATIENT
Start: 2018-12-10 | End: 2018-12-20 | Stop reason: HOSPADM

## 2018-12-10 RX ORDER — BENAZEPRIL HYDROCHLORIDE 40 MG/1
40 TABLET ORAL 2 TIMES DAILY
Status: DISCONTINUED | OUTPATIENT
Start: 2018-12-10 | End: 2018-12-11

## 2018-12-10 RX ORDER — IBUPROFEN 200 MG
16 TABLET ORAL
Status: DISCONTINUED | OUTPATIENT
Start: 2018-12-10 | End: 2018-12-20 | Stop reason: HOSPADM

## 2018-12-10 RX ADMIN — BENAZEPRIL HYDROCHLORIDE 40 MG: 40 TABLET, COATED ORAL at 08:12

## 2018-12-10 RX ADMIN — MEROPENEM AND SODIUM CHLORIDE 1 G: 1 INJECTION, SOLUTION INTRAVENOUS at 05:12

## 2018-12-10 RX ADMIN — CARVEDILOL 6.25 MG: 6.25 TABLET, FILM COATED ORAL at 08:12

## 2018-12-10 RX ADMIN — ENOXAPARIN SODIUM 40 MG: 100 INJECTION SUBCUTANEOUS at 09:12

## 2018-12-10 RX ADMIN — OMEGA-3 FATTY ACIDS CAP 1000 MG 1 CAPSULE: 1000 CAP at 09:12

## 2018-12-10 RX ADMIN — OXYCODONE AND ACETAMINOPHEN 1 TABLET: 5; 325 TABLET ORAL at 09:12

## 2018-12-10 RX ADMIN — FUROSEMIDE 40 MG: 40 TABLET ORAL at 08:12

## 2018-12-10 NOTE — TELEPHONE ENCOUNTER
Please check with patient to see if he has been contacted and scheduled for PICC line placement    Thanks  Dr. Davis

## 2018-12-10 NOTE — ED PROVIDER NOTES
Encounter Date: 12/10/2018    SCRIBE #1 NOTE: I, Mirian Cardona, am scribing for, and in the presence of,  Jerardo White MD. I have scribed the following portions of the note - Other sections scribed: ROS and HPI.       History     Chief Complaint   Patient presents with    IV Medication     Pt wound cx resistant to PO abx. He was sent in last night for IV med. Pt unable to picc line until Wed. Pt needs another dose of IV antibiotic.     CC: IV Antibiotic dose    HPI: This 58 y.o. male with a chronic ulcer of left foot with fat layer exposed (10/23/2018), PVD, Type 2 diabetes mellitus with left diabetic foot ulcer (10/29/2018), who is s/p L toe amputation a week ago, presents to the ED to get 2nd dose of his IV meropenum. He received his first dose yesterday in this ED due to failed PO antibiotic treatment. He supposed to follow up with his PCP today to get a PICC line but reports appointment is not available until two days later. Denies fever, chills, N/V or any other sx. No other new complaints.      PMHx of Arthritis, Cellulitis, CKD, stage III, CAD, Diabetes mellitus, Diabetic retinopathy, Diabetic ulcer of left foot, Glaucoma, Gout, Hyperlipemia, Hypertension, ICD in place (11/02/2018).      The history is provided by the patient. No  was used.     Review of patient's allergies indicates:   Allergen Reactions    Statins-hmg-coa reductase inhibitors      Generalized Pain    Onglyza [saxagliptin]     Penicillins Rash     Past Medical History:   Diagnosis Date    Arthritis     Cellulitis     CKD (chronic kidney disease), stage III     Coronary artery disease     Diabetes mellitus     Diabetic retinopathy     Diabetic ulcer of left foot     Glaucoma     Gout     Hyperlipemia     Hypertension     ICD (implantable cardioverter-defibrillator) in place 11/02/2018    Left chest    Non-pressure chronic ulcer of other part of left foot with fat layer exposed 10/23/2018    PVD (peripheral  vascular disease)     Type 2 diabetes mellitus with left diabetic foot ulcer 10/29/2018    Unsteady gait     uses a wheelchair     Past Surgical History:   Procedure Laterality Date    AHMED GLAUCOMA IMPLANT Left 2011    DONE AT [facility]    AMPUTATION, TOE Left 12/5/2018    Performed by [facility] Chan [facility] at [facility] OR    AMPUTATION, TOES  2-5 Left 11/16/2018    Performed by [facility] at [facility] OR    BAERVELDT GLAUCOMA IMPLANT Left 2012    WITH CATARACT EXTRACTION//DONE AT [facility]    CARDIAC CATHETERIZATION Left 05/2016    CARDIAC DEFIBRILLATOR PLACEMENT Left 11/02/2018    CATARACT EXTRACTION W/  INTRAOCULAR LENS IMPLANT Left 2012    WITH BAERVEDT//DONE AT [facility]    CATARACT EXTRACTION W/  INTRAOCULAR LENS IMPLANT Right 09/26/2018    COMPLEX ()    CB DESTRUCTION WITH CYCLO G6 Left 02/15/2017        CYST REMOVAL      Exam under anesthesia, left foot debridement, washout and all other indicated procedures Left 12/5/2018    Performed by [facility] at [facility] OR    EXAMINATION UNDER ANESTHESIA Left 12/5/2018    Procedure: Exam under anesthesia, left foot debridement, washout and all other indicated procedures;  Surgeon: Mitch Chan MD;  Location: [facility] OR;  Service: Vascular;  Laterality: Left;  1030AM START  RN PREOP 12/3/2018-----AICD  SEEN BY DR JAMES 12/4    HEART CATH-LEFT N/A 5/6/2016    Per[facility] CATH LAB    INCISION AND DRAINAGE Left 11/14/2018    Procedure: Incision and Drainage, left lower extremity debridement, washout;  Surgeon: Mitch Chan MD;  Location: [facility] OR;  Service: Vascular;  Laterality: Left;    INCISION AND DRAINAGE Left 11/16/2018    Procedure: INCISION AND DRAINAGE;  Surgeon: Mitch Chan MD;  Location: [facility] OR;  Service: Vascular;  Laterality: Left;    INCISION AND DRAINAGE Left 11/16/2018    Performed by [facility] at [facility] OR    Incision and Drainage, left  lower extremity debridement, washout Left 11/14/2018    Performed by [facility] at [facility] OR    INSERTION, ICD GENERATOR, SINGLE CHAMBER N/A 11/2/2018    Performed by Pernell Greer MD at [facility] CATH LAB    INSERTION, IOL PROSTHESIS Right 9/26/2018    Performed by Perla Cortés MD at [facility] OR [address] [place] [address]    INTRAOCULAR PROSTHESES INSERTION Right 9/26/2018    Procedure: INSERTION, IOL PROSTHESIS;  Surgeon: Perla Cortés MD;  Location: [facility] OR [address] [place] [address];  Service: Ophthalmology;  Laterality: Right;    PHACOEMULSIFICATION OF CATARACT Right 9/26/2018    Procedure: PHACOEMULSIFICATION, CATARACT;  Surgeon: Perla Cortés MD;  Location: [facility] OR [address] [place] [address];  Service: Ophthalmology;  Laterality: Right;    PHACOEMULSIFICATION, CATARACT Right 9/26/2018    Performed by Perla Cortés MD at [facility] OR [address] [place] [address]    Right foot surgery  10/2014    TOE AMPUTATION Right     first and second    TOE AMPUTATION Left 11/16/2018    Procedure: AMPUTATION, TOES  2-5;  Surgeon: Mitch Chan MD;  Location: [facility] OR;  Service: Vascular;  Laterality: Left;    TOE AMPUTATION Left 12/5/2018    Procedure: AMPUTATION, TOE;  Surgeon: Mitch Chan MD;  Location: [facility];  Service: Vascular;  Laterality: Left;  left great toe    TONSILLECTOMY      TRABECULECTOMY/G 6 LASER Left 2/15/2017    Performed by Perla Cortés MD at [facility] OR [address] [place] [address]     Family History   Problem Relation Age of Onset    Diabetes Mother     Heart disease Brother     No Known Problems Father     No Known Problems Sister     No Known Problems Maternal Aunt     No Known Problems Maternal Uncle     No Known Problems Paternal Aunt     No Known Problems Paternal Uncle     No Known Problems Maternal Grandmother     No Known Problems Maternal Grandfather     No Known Problems Paternal Grandmother     No Known Problems Paternal Grandfather     Anemia Neg Hx     Arrhythmia Neg Hx     Asthma Neg Hx     Clotting disorder  Neg Hx     Fainting Neg Hx     Heart attack Neg Hx     Heart failure Neg Hx     Hyperlipidemia Neg Hx     Hypertension Neg Hx     Stroke Neg Hx     Atrial Septal Defect Neg Hx     Amblyopia Neg Hx     Blindness Neg Hx     Glaucoma Neg Hx     Macular degeneration Neg Hx     Retinal detachment Neg Hx     Strabismus Neg Hx      Social History     Tobacco Use    Smoking status: Former Smoker     Packs/day: 1.00     Years: 3.00     Pack years: 3.00     Types: Cigarettes     Last attempt to quit: 1984     Years since quittin.4    Smokeless tobacco: Never Used   Substance Use Topics    Alcohol use: Yes     Alcohol/week: 0.6 oz     Types: 1 Cans of beer per week     Comment: Occasional    Drug use: No     Review of Systems   Constitutional: Negative for activity change and appetite change.   HENT: Negative for congestion.    Eyes: Negative for visual disturbance.   Respiratory: Negative for cough and chest tightness.    Cardiovascular: Negative for chest pain.   Gastrointestinal: Negative for abdominal pain and nausea.   Endocrine: Negative for polyuria.   Genitourinary: Negative for difficulty urinating and frequency.   Musculoskeletal: Negative for back pain.   Skin: Negative for color change.   Neurological: Negative for dizziness and syncope.   Hematological: Negative for adenopathy.   Psychiatric/Behavioral: Negative for agitation and confusion.   All other systems reviewed and are negative.      Physical Exam     Initial Vitals [12/10/18 1620]   BP Pulse Resp Temp SpO2   117/71 89 18 98.3 °F (36.8 °C) 97 %      MAP       --         Physical Exam    Nursing note and vitals reviewed.  Constitutional: Vital signs are normal. He appears well-developed and well-nourished. He is active.  Non-toxic appearance. No distress.   HENT:   Head: Normocephalic and atraumatic.   Eyes: EOM are normal.   Neck: Trachea normal. Neck supple.   Cardiovascular: Normal rate and regular rhythm.   Pulmonary/Chest:  Breath sounds normal. No respiratory distress.   Abdominal: Soft. Normal appearance and bowel sounds are normal. He exhibits no distension. There is no tenderness.   Musculoskeletal: Normal range of motion. He exhibits no edema.   Neurological: He is alert.   Skin: Skin is warm, dry and intact.   The patient's L foot is wrapped and has a boot. Erythema present to L leg. The patient R leg is also wrapped.   Psychiatric: He has a normal mood and affect.         ED Course   Procedures  Labs Reviewed   CBC W/ AUTO DIFFERENTIAL - Abnormal; Notable for the following components:       Result Value    RBC 3.01 (*)     Hemoglobin 9.2 (*)     Hematocrit 28.2 (*)     RDW 16.8 (*)     MPV 8.6 (*)     Lymph # 0.5 (*)     Gran% 80.6 (*)     Lymph% 7.4 (*)     All other components within normal limits   BASIC METABOLIC PANEL - Abnormal; Notable for the following components:    BUN, Bld 56 (*)     Anion Gap 7 (*)     All other components within normal limits   PROTIME-INR   APTT          Imaging Results    None          Medical Decision Making:   Differential Diagnosis:   Patient is presenting today with continued foot infection.  Saw the patient yesterday where they are supposed to set up for outpatient antibiotics and PICC line.  Unfortunately this was unsuccessful the patient returned back to the emergency department.  Due the fact the patient needs to be on meropenem q.8 hours with patient observation and antibiotics and PICC line placement.  Additionally we will have social work see the patient to try to help out with home health services to make sure that IV antibiotics are set up for his home treatment.  Patient is agreeable with this plan.  I spoke with Francis and basic labs for the PICC line nurse be able to place.  Clinical Tests:   Lab Tests: Ordered and Reviewed            Scribe Attestation:   Scribe #1: I performed the above scribed service and the documentation accurately describes the services I performed. I  attest to the accuracy of the note.    Attending Attestation:           Physician Attestation for Scribe:  Physician Attestation Statement for Scribe #1: I, Jerardo White MD, reviewed documentation, as scribed by Mirian Cardona in my presence, and it is both accurate and complete.                    Clinical Impression:   The encounter diagnosis was Foot infection.                             Jerardo White MD  12/10/18 [id number]       Jerardo White MD  12/20/18 1156

## 2018-12-10 NOTE — TELEPHONE ENCOUNTER
----- Message from Rubén Davis MD sent at 12/10/2018  7:47 AM CST -----  Please contact patient and let them know the culture results were not fully back when I left Friday, so this is why no antibiotics were prescribed and he wouldn't have been able to get a PICC line until today.  I am putting the orders in for PICC line placement and will order meropenem by home health      Thanks,  Dr. Davis

## 2018-12-10 NOTE — TELEPHONE ENCOUNTER
While on call this afternoon today I was contacted by on call nurses on behalf of this patient who was upset that after having surgery for a foot infection and his great toe removed a few days ago he was discharged to his home.  The patient was told by his surgeon that his Primary Care doctor would be contacting him about the culture results and starting him on antibiotics. But he hasnt heard from anyone and he is upset and nervous that his wound is open.  The nurses reviewed his positive wound culture report with me and the infection is resistant to oral antibiotics. He will require IV antibiotics.  I then called his Home Health provider ([facility] ) in an effort to initiate this.  The nurse on call states that this would have to go through their infusion provider and that I would have to speak to [phone].  I called them and spoke to a nurse Brenda with that group but since the patient has never had Meropenem 1 gm q 8 hrs ( the med and dose recommended by the pharmacist- who I called and spoke to)  -the patient would have to go to the ER to have the first dose given.    They also recommend he have a PIC line for further doses.   I then phones the patient and explained all this and offered him the option of waiting till tomorrow and he could work with   Dr Davis to get the PIC line done and get the antibiotic initiated tomorrow or he could try and go in to the ed tonight and get 1 dose and then tomorrow contact Dr Lopez about getting all this started- PIC line and initiate Meropenem 1 gm IV q 8 hours - if Dr Lopez agrees.  Pt wanted to go to the ER at [facility].  We contacted Mary perez to let them know he was coming in.    Later tonight I spoke with the ER Dr at [facility] and relayed the story again and he agreed to go ahead and give the med.    I will pass along this info to Dr Davis.

## 2018-12-10 NOTE — TELEPHONE ENCOUNTER
Spoke to Kirsten in IR. Pt is scheduled for PICC line insertion 12/12/18 - needs to go to pt registration for 10:30 AM. Pt's sister Chloé was notified ([phone]). Chloé is concerned about pt being without med until PICC line is inserted. Dr Davis was notified, and Chloé was instructed to bring pt to the ER per Dr Davis's orders.

## 2018-12-10 NOTE — TELEPHONE ENCOUNTER
Notified patient of Dr. Davis's message. Told pt. To return call if no one has contacted him by this afternoon.

## 2018-12-10 NOTE — TELEPHONE ENCOUNTER
Left message on Interventional Radiology Dept voicemail that PICC line insertion needs to be scheduled .

## 2018-12-10 NOTE — TELEPHONE ENCOUNTER
----- Message from Delmis Thakur sent at 12/10/2018 10:53 AM CST -----  Contact: Self/ [phone]  Pt requesting call back from staff. Says he is ready to schedule PICC line insertion. Thank you.

## 2018-12-10 NOTE — ED TRIAGE NOTES
Pt reports to ED needing IV antibiotics since he was told he was unable to get a PICC line placed today; he was told that he would be unable to get a PICC line placed until Wednesday; AAOx4

## 2018-12-11 ENCOUNTER — TELEPHONE (OUTPATIENT)
Dept: FAMILY MEDICINE | Facility: CLINIC | Age: 58
End: 2018-12-11

## 2018-12-11 PROBLEM — J96.01 ACUTE HYPOXEMIC RESPIRATORY FAILURE: Status: ACTIVE | Noted: 2018-12-11

## 2018-12-11 PROBLEM — N17.9 AKI (ACUTE KIDNEY INJURY): Status: ACTIVE | Noted: 2018-10-12

## 2018-12-11 PROBLEM — R57.9 SHOCK: Status: ACTIVE | Noted: 2018-12-11

## 2018-12-11 PROBLEM — I46.9 CARDIAC ARREST: Status: ACTIVE | Noted: 2018-12-11

## 2018-12-11 LAB
ALBUMIN SERPL BCP-MCNC: 2 G/DL
ALBUMIN SERPL BCP-MCNC: 2.1 G/DL
ALLENS TEST: ABNORMAL
ALLENS TEST: ABNORMAL
ALP SERPL-CCNC: 128 U/L
ALP SERPL-CCNC: 133 U/L
ALT SERPL W/O P-5'-P-CCNC: 16 U/L
ALT SERPL W/O P-5'-P-CCNC: 21 U/L
ANION GAP SERPL CALC-SCNC: 10 MMOL/L
ANION GAP SERPL CALC-SCNC: 7 MMOL/L
AORTIC ROOT ANNULUS: 3.08 CM
AORTIC VALVE CUSP SEPERATION: 1.95 CM
ASCENDING AORTA: 2.6 CM
AST SERPL-CCNC: 20 U/L
AST SERPL-CCNC: 29 U/L
BASOPHILS # BLD AUTO: 0.03 K/UL
BASOPHILS # BLD AUTO: 0.03 K/UL
BASOPHILS NFR BLD: 0.3 %
BASOPHILS NFR BLD: 0.4 %
BILIRUB SERPL-MCNC: 0.5 MG/DL
BILIRUB SERPL-MCNC: 0.5 MG/DL
BNP SERPL-MCNC: 2503 PG/ML
BSA FOR ECHO PROCEDURE: 2.33 M2
BUN SERPL-MCNC: 58 MG/DL
BUN SERPL-MCNC: 60 MG/DL
CALCIUM SERPL-MCNC: 8.2 MG/DL
CALCIUM SERPL-MCNC: 8.5 MG/DL
CHLORIDE SERPL-SCNC: 106 MMOL/L
CHLORIDE SERPL-SCNC: 106 MMOL/L
CO2 SERPL-SCNC: 21 MMOL/L
CO2 SERPL-SCNC: 26 MMOL/L
CREAT SERPL-MCNC: 1.2 MG/DL
CREAT SERPL-MCNC: 1.4 MG/DL
CRP SERPL-MCNC: 89.8 MG/L
CV ECHO LV RWT: 0.35 CM
DELSYS: ABNORMAL
DELSYS: ABNORMAL
DIFFERENTIAL METHOD: ABNORMAL
DIFFERENTIAL METHOD: ABNORMAL
DOP CALC LVOT AREA: 2.8 CM2
DOP CALC LVOT DIAMETER: 1.89 CM
ECHO LV POSTERIOR WALL: 0.95 CM (ref 0.6–1.1)
EOSINOPHIL # BLD AUTO: 0.1 K/UL
EOSINOPHIL # BLD AUTO: 0.1 K/UL
EOSINOPHIL NFR BLD: 0.9 %
EOSINOPHIL NFR BLD: 1.1 %
ERYTHROCYTE [DISTWIDTH] IN BLOOD BY AUTOMATED COUNT: 16.8 %
ERYTHROCYTE [DISTWIDTH] IN BLOOD BY AUTOMATED COUNT: 16.9 %
ERYTHROCYTE [SEDIMENTATION RATE] IN BLOOD BY WESTERGREN METHOD: 16 MM/H
ERYTHROCYTE [SEDIMENTATION RATE] IN BLOOD BY WESTERGREN METHOD: 16 MM/H
ERYTHROCYTE [SEDIMENTATION RATE] IN BLOOD BY WESTERGREN METHOD: 72 MM/HR
EST. GFR  (AFRICAN AMERICAN): >60 ML/MIN/1.73 M^2
EST. GFR  (AFRICAN AMERICAN): >60 ML/MIN/1.73 M^2
EST. GFR  (NON AFRICAN AMERICAN): 54 ML/MIN/1.73 M^2
EST. GFR  (NON AFRICAN AMERICAN): >60 ML/MIN/1.73 M^2
FIO2: 40
FIO2: 60
FRACTIONAL SHORTENING: 16 % (ref 28–44)
GLUCOSE SERPL-MCNC: 132 MG/DL
GLUCOSE SERPL-MCNC: 76 MG/DL
HCO3 UR-SCNC: 19.9 MMOL/L (ref 24–28)
HCO3 UR-SCNC: 20.8 MMOL/L (ref 24–28)
HCT VFR BLD AUTO: 26.2 %
HCT VFR BLD AUTO: 26.6 %
HGB BLD-MCNC: 8.5 G/DL
HGB BLD-MCNC: 8.6 G/DL
INTERVENTRICULAR SEPTUM: 0.94 CM (ref 0.6–1.1)
LA MAJOR: 5.99 CM
LA MINOR: 5.77 CM
LA WIDTH: 4.7 CM
LACTATE SERPL-SCNC: 2.5 MMOL/L
LEFT ATRIUM SIZE: 4.84 CM
LEFT ATRIUM VOLUME INDEX: 50.2 ML/M2
LEFT ATRIUM VOLUME: 113.65 CM3
LEFT INTERNAL DIMENSION IN SYSTOLE: 4.59 CM (ref 2.1–4)
LEFT VENTRICLE DIASTOLIC VOLUME INDEX: 64.5 ML/M2
LEFT VENTRICLE DIASTOLIC VOLUME: 145.99 ML
LEFT VENTRICLE MASS INDEX: 86.9 G/M2
LEFT VENTRICLE SYSTOLIC VOLUME INDEX: 42.8 ML/M2
LEFT VENTRICLE SYSTOLIC VOLUME: 96.84 ML
LEFT VENTRICULAR INTERNAL DIMENSION IN DIASTOLE: 5.48 CM (ref 3.5–6)
LEFT VENTRICULAR MASS: 196.74 G
LYMPHOCYTES # BLD AUTO: 0.5 K/UL
LYMPHOCYTES # BLD AUTO: 0.5 K/UL
LYMPHOCYTES NFR BLD: 6 %
LYMPHOCYTES NFR BLD: 6.4 %
MAGNESIUM SERPL-MCNC: 1.8 MG/DL
MCH RBC QN AUTO: 30.4 PG
MCH RBC QN AUTO: 31 PG
MCHC RBC AUTO-ENTMCNC: 32 G/DL
MCHC RBC AUTO-ENTMCNC: 32.8 G/DL
MCV RBC AUTO: 95 FL
MCV RBC AUTO: 95 FL
MODE: ABNORMAL
MODE: ABNORMAL
MONOCYTES # BLD AUTO: 0.8 K/UL
MONOCYTES # BLD AUTO: 0.9 K/UL
MONOCYTES NFR BLD: 10.7 %
MONOCYTES NFR BLD: 8.9 %
NEUTROPHILS # BLD AUTO: 7.1 K/UL
NEUTROPHILS # BLD AUTO: 7.1 K/UL
NEUTROPHILS NFR BLD: 82.1 %
NEUTROPHILS NFR BLD: 83.2 %
PCO2 BLDA: 30.3 MMHG (ref 35–45)
PCO2 BLDA: 37.3 MMHG (ref 35–45)
PEEP: 8
PEEP: 8
PH SMN: 7.35 [PH] (ref 7.35–7.45)
PH SMN: 7.43 [PH] (ref 7.35–7.45)
PHOSPHATE SERPL-MCNC: 5.6 MG/DL
PISA TR MAX VEL: 3.53 M/S
PLATELET # BLD AUTO: 297 K/UL
PLATELET # BLD AUTO: 327 K/UL
PMV BLD AUTO: 8.6 FL
PMV BLD AUTO: 8.7 FL
PO2 BLDA: 58 MMHG (ref 40–60)
PO2 BLDA: 77 MMHG (ref 80–100)
POC BE: -4 MMOL/L
POC BE: -4 MMOL/L
POC SATURATED O2: 89 % (ref 95–100)
POC SATURATED O2: 96 % (ref 95–100)
POC TCO2: 21 MMOL/L (ref 23–27)
POC TCO2: 22 MMOL/L (ref 24–29)
POCT GLUCOSE: 162 MG/DL (ref 70–110)
POCT GLUCOSE: 74 MG/DL (ref 70–110)
POCT GLUCOSE: 85 MG/DL (ref 70–110)
POTASSIUM SERPL-SCNC: 4.1 MMOL/L
POTASSIUM SERPL-SCNC: 4.4 MMOL/L
PROT SERPL-MCNC: 6.6 G/DL
PROT SERPL-MCNC: 6.8 G/DL
PV PEAK VELOCITY: 0.97 CM/S
RA MAJOR: 5.54 CM
RA WIDTH: 4.67 CM
RBC # BLD AUTO: 2.77 M/UL
RBC # BLD AUTO: 2.8 M/UL
RIGHT VENTRICULAR END-DIASTOLIC DIMENSION: 5.45 CM
RV TISSUE DOPPLER FREE WALL SYSTOLIC VELOCITY 1 (APICAL 4 CHAMBER VIEW): 11.2 M/S
SAMPLE: ABNORMAL
SAMPLE: ABNORMAL
SINUS: 3.19 CM
SITE: ABNORMAL
SITE: ABNORMAL
SODIUM SERPL-SCNC: 137 MMOL/L
SODIUM SERPL-SCNC: 139 MMOL/L
SP02: 99
STJ: 2.25 CM
TR MAX PG: 49.84 MMHG
TRICUSPID ANNULAR PLANE SYSTOLIC EXCURSION: 1.48 CM
TROPONIN I SERPL DL<=0.01 NG/ML-MCNC: 0.45 NG/ML
TROPONIN I SERPL DL<=0.01 NG/ML-MCNC: 0.53 NG/ML
VT: 460
VT: 460
WBC # BLD AUTO: 8.5 K/UL
WBC # BLD AUTO: 8.68 K/UL

## 2018-12-11 PROCEDURE — 63600175 PHARM REV CODE 636 W HCPCS: Performed by: HOSPITALIST

## 2018-12-11 PROCEDURE — 80053 COMPREHEN METABOLIC PANEL: CPT | Mod: 91

## 2018-12-11 PROCEDURE — 83605 ASSAY OF LACTIC ACID: CPT

## 2018-12-11 PROCEDURE — 99900026 HC AIRWAY MAINTENANCE (STAT)

## 2018-12-11 PROCEDURE — 25000003 PHARM REV CODE 250: Performed by: HOSPITALIST

## 2018-12-11 PROCEDURE — 80053 COMPREHEN METABOLIC PANEL: CPT

## 2018-12-11 PROCEDURE — 25000003 PHARM REV CODE 250: Performed by: NURSE PRACTITIONER

## 2018-12-11 PROCEDURE — 99291 CRITICAL CARE FIRST HOUR: CPT | Mod: 25,,, | Performed by: INTERNAL MEDICINE

## 2018-12-11 PROCEDURE — S0028 INJECTION, FAMOTIDINE, 20 MG: HCPCS | Performed by: HOSPITALIST

## 2018-12-11 PROCEDURE — 63600175 PHARM REV CODE 636 W HCPCS: Performed by: EMERGENCY MEDICINE

## 2018-12-11 PROCEDURE — 20000000 HC ICU ROOM

## 2018-12-11 PROCEDURE — 36415 COLL VENOUS BLD VENIPUNCTURE: CPT

## 2018-12-11 PROCEDURE — 93005 ELECTROCARDIOGRAM TRACING: CPT

## 2018-12-11 PROCEDURE — 25000003 PHARM REV CODE 250: Performed by: INTERNAL MEDICINE

## 2018-12-11 PROCEDURE — A4216 STERILE WATER/SALINE, 10 ML: HCPCS | Performed by: INTERNAL MEDICINE

## 2018-12-11 PROCEDURE — 84484 ASSAY OF TROPONIN QUANT: CPT

## 2018-12-11 PROCEDURE — 63700000 PHARM REV CODE 250 ALT 637 W/O HCPCS: Performed by: HOSPITALIST

## 2018-12-11 PROCEDURE — 5A1945Z RESPIRATORY VENTILATION, 24-96 CONSECUTIVE HOURS: ICD-10-PCS | Performed by: INTERNAL MEDICINE

## 2018-12-11 PROCEDURE — 25000003 PHARM REV CODE 250: Performed by: EMERGENCY MEDICINE

## 2018-12-11 PROCEDURE — 83735 ASSAY OF MAGNESIUM: CPT

## 2018-12-11 PROCEDURE — 84100 ASSAY OF PHOSPHORUS: CPT

## 2018-12-11 PROCEDURE — 5A12012 PERFORMANCE OF CARDIAC OUTPUT, SINGLE, MANUAL: ICD-10-PCS | Performed by: EMERGENCY MEDICINE

## 2018-12-11 PROCEDURE — 86140 C-REACTIVE PROTEIN: CPT

## 2018-12-11 PROCEDURE — 63600175 PHARM REV CODE 636 W HCPCS: Performed by: NURSE PRACTITIONER

## 2018-12-11 PROCEDURE — 82803 BLOOD GASES ANY COMBINATION: CPT

## 2018-12-11 PROCEDURE — 36620 INSERTION CATHETER ARTERY: CPT | Mod: ,,, | Performed by: GENERAL ACUTE CARE HOSPITAL

## 2018-12-11 PROCEDURE — 0BH17EZ INSERTION OF ENDOTRACHEAL AIRWAY INTO TRACHEA, VIA NATURAL OR ARTIFICIAL OPENING: ICD-10-PCS | Performed by: EMERGENCY MEDICINE

## 2018-12-11 PROCEDURE — 83880 ASSAY OF NATRIURETIC PEPTIDE: CPT

## 2018-12-11 PROCEDURE — 99291 CRITICAL CARE FIRST HOUR: CPT | Mod: 25,,, | Performed by: GENERAL ACUTE CARE HOSPITAL

## 2018-12-11 PROCEDURE — 99900035 HC TECH TIME PER 15 MIN (STAT)

## 2018-12-11 PROCEDURE — 85652 RBC SED RATE AUTOMATED: CPT

## 2018-12-11 PROCEDURE — 94761 N-INVAS EAR/PLS OXIMETRY MLT: CPT

## 2018-12-11 PROCEDURE — 25000003 PHARM REV CODE 250

## 2018-12-11 PROCEDURE — 93010 ELECTROCARDIOGRAM REPORT: CPT | Mod: ,,, | Performed by: INTERNAL MEDICINE

## 2018-12-11 PROCEDURE — 85025 COMPLETE CBC W/AUTO DIFF WBC: CPT

## 2018-12-11 PROCEDURE — 37799 UNLISTED PX VASCULAR SURGERY: CPT

## 2018-12-11 PROCEDURE — 94002 VENT MGMT INPAT INIT DAY: CPT

## 2018-12-11 PROCEDURE — 87040 BLOOD CULTURE FOR BACTERIA: CPT | Mod: 59

## 2018-12-11 RX ORDER — MICONAZOLE NITRATE 2 %
POWDER (GRAM) TOPICAL 2 TIMES DAILY
Status: DISCONTINUED | OUTPATIENT
Start: 2018-12-11 | End: 2018-12-20 | Stop reason: HOSPADM

## 2018-12-11 RX ORDER — CHLORHEXIDINE GLUCONATE ORAL RINSE 1.2 MG/ML
15 SOLUTION DENTAL 2 TIMES DAILY
Status: DISCONTINUED | OUTPATIENT
Start: 2018-12-11 | End: 2018-12-13

## 2018-12-11 RX ORDER — CLOPIDOGREL 300 MG/1
600 TABLET, FILM COATED ORAL ONCE
Status: COMPLETED | OUTPATIENT
Start: 2018-12-11 | End: 2018-12-11

## 2018-12-11 RX ORDER — NALOXONE HCL 0.4 MG/ML
VIAL (ML) INJECTION
Status: DISCONTINUED
Start: 2018-12-11 | End: 2018-12-11 | Stop reason: WASHOUT

## 2018-12-11 RX ORDER — MEROPENEM AND SODIUM CHLORIDE 1 G/50ML
1 INJECTION, SOLUTION INTRAVENOUS
Status: DISCONTINUED | OUTPATIENT
Start: 2018-12-11 | End: 2018-12-11

## 2018-12-11 RX ORDER — BRIMONIDINE TARTRATE 1 MG/ML
1 SOLUTION/ DROPS OPHTHALMIC 2 TIMES DAILY
Status: DISCONTINUED | OUTPATIENT
Start: 2018-12-11 | End: 2018-12-20 | Stop reason: HOSPADM

## 2018-12-11 RX ORDER — NOREPINEPHRINE BITARTRATE/D5W 4MG/250ML
PLASTIC BAG, INJECTION (ML) INTRAVENOUS
Status: COMPLETED
Start: 2018-12-11 | End: 2018-12-11

## 2018-12-11 RX ORDER — ENOXAPARIN SODIUM 150 MG/ML
1 INJECTION SUBCUTANEOUS
Status: DISCONTINUED | OUTPATIENT
Start: 2018-12-11 | End: 2018-12-12

## 2018-12-11 RX ORDER — NALOXONE HYDROCHLORIDE 1 MG/ML
INJECTION INTRAMUSCULAR; INTRAVENOUS; SUBCUTANEOUS CODE/TRAUMA/SEDATION MEDICATION
Status: COMPLETED | OUTPATIENT
Start: 2018-12-11 | End: 2018-12-11

## 2018-12-11 RX ORDER — PROPOFOL 10 MG/ML
INJECTION, EMULSION INTRAVENOUS
Status: DISPENSED
Start: 2018-12-11 | End: 2018-12-12

## 2018-12-11 RX ORDER — PROPOFOL 10 MG/ML
5 INJECTION, EMULSION INTRAVENOUS CONTINUOUS
Status: DISCONTINUED | OUTPATIENT
Start: 2018-12-11 | End: 2018-12-13

## 2018-12-11 RX ORDER — FUROSEMIDE 10 MG/ML
40 INJECTION INTRAMUSCULAR; INTRAVENOUS DAILY
Status: DISCONTINUED | OUTPATIENT
Start: 2018-12-12 | End: 2018-12-17

## 2018-12-11 RX ORDER — EPINEPHRINE 0.1 MG/ML
INJECTION INTRAVENOUS CODE/TRAUMA/SEDATION MEDICATION
Status: COMPLETED | OUTPATIENT
Start: 2018-12-11 | End: 2018-12-11

## 2018-12-11 RX ORDER — FAMOTIDINE 10 MG/ML
20 INJECTION INTRAVENOUS 2 TIMES DAILY
Status: DISCONTINUED | OUTPATIENT
Start: 2018-12-11 | End: 2018-12-13

## 2018-12-11 RX ORDER — ENOXAPARIN SODIUM 100 MG/ML
40 INJECTION SUBCUTANEOUS EVERY 24 HOURS
Status: DISCONTINUED | OUTPATIENT
Start: 2018-12-11 | End: 2018-12-11

## 2018-12-11 RX ORDER — SODIUM CHLORIDE 9 MG/ML
INJECTION, SOLUTION INTRAVENOUS
Status: COMPLETED | OUTPATIENT
Start: 2018-12-11 | End: 2018-12-11

## 2018-12-11 RX ORDER — NOREPINEPHRINE BITARTRATE/D5W 4MG/250ML
0.02 PLASTIC BAG, INJECTION (ML) INTRAVENOUS CONTINUOUS
Status: DISCONTINUED | OUTPATIENT
Start: 2018-12-11 | End: 2018-12-11

## 2018-12-11 RX ORDER — CLOPIDOGREL BISULFATE 75 MG/1
75 TABLET ORAL DAILY
Status: DISCONTINUED | OUTPATIENT
Start: 2018-12-12 | End: 2018-12-20 | Stop reason: HOSPADM

## 2018-12-11 RX ORDER — ASPIRIN 325 MG
325 TABLET ORAL ONCE
Status: COMPLETED | OUTPATIENT
Start: 2018-12-11 | End: 2018-12-11

## 2018-12-11 RX ORDER — NOREPINEPHRINE BITARTRATE/D5W 4MG/250ML
0.02 PLASTIC BAG, INJECTION (ML) INTRAVENOUS CONTINUOUS
Status: DISCONTINUED | OUTPATIENT
Start: 2018-12-11 | End: 2018-12-15

## 2018-12-11 RX ORDER — MEROPENEM AND SODIUM CHLORIDE 1 G/50ML
1 INJECTION, SOLUTION INTRAVENOUS EVERY 8 HOURS
Qty: 126 EACH | Refills: 0 | Status: SHIPPED | OUTPATIENT
Start: 2018-12-10 | End: 2018-12-11 | Stop reason: HOSPADM

## 2018-12-11 RX ORDER — FENTANYL CITRATE 50 UG/ML
100 INJECTION, SOLUTION INTRAMUSCULAR; INTRAVENOUS ONCE
Status: COMPLETED | OUTPATIENT
Start: 2018-12-11 | End: 2018-12-11

## 2018-12-11 RX ORDER — PREDNISOLONE ACETATE 10 MG/ML
1 SUSPENSION/ DROPS OPHTHALMIC DAILY
Status: DISCONTINUED | OUTPATIENT
Start: 2018-12-11 | End: 2018-12-20 | Stop reason: HOSPADM

## 2018-12-11 RX ADMIN — FAMOTIDINE 20 MG: 10 INJECTION INTRAVENOUS at 08:12

## 2018-12-11 RX ADMIN — NOREPINEPHRINE-DEXTROSE IV SOLUTION 4 MG/250ML-5% 0.06 MCG/KG/MIN: 4-5/250 SOLUTION at 02:12

## 2018-12-11 RX ADMIN — FENTANYL CITRATE 100 MCG: 50 INJECTION, SOLUTION INTRAMUSCULAR; INTRAVENOUS at 02:12

## 2018-12-11 RX ADMIN — ALLOPURINOL 200 MG: 100 TABLET ORAL at 10:12

## 2018-12-11 RX ADMIN — MEROPENEM 2 G: 1 INJECTION, POWDER, FOR SOLUTION INTRAVENOUS at 05:12

## 2018-12-11 RX ADMIN — CHLORHEXIDINE GLUCONATE 0.12% ORAL RINSE 15 ML: 1.2 LIQUID ORAL at 08:12

## 2018-12-11 RX ADMIN — CARVEDILOL 6.25 MG: 6.25 TABLET, FILM COATED ORAL at 10:12

## 2018-12-11 RX ADMIN — OXYCODONE AND ACETAMINOPHEN 1 TABLET: 5; 325 TABLET ORAL at 10:12

## 2018-12-11 RX ADMIN — PROPOFOL 45 MCG/KG/MIN: 10 INJECTION, EMULSION INTRAVENOUS at 07:12

## 2018-12-11 RX ADMIN — PROPOFOL 50 MCG/KG/MIN: 10 INJECTION, EMULSION INTRAVENOUS at 05:12

## 2018-12-11 RX ADMIN — ASPIRIN 325 MG ORAL TABLET 325 MG: 325 PILL ORAL at 04:12

## 2018-12-11 RX ADMIN — PREDNISOLONE ACETATE 1 DROP: 10 SUSPENSION/ DROPS OPHTHALMIC at 05:12

## 2018-12-11 RX ADMIN — OXYCODONE AND ACETAMINOPHEN 1 TABLET: 5; 325 TABLET ORAL at 03:12

## 2018-12-11 RX ADMIN — ENOXAPARIN SODIUM 110 MG: 150 INJECTION SUBCUTANEOUS at 04:12

## 2018-12-11 RX ADMIN — Medication 10 ML: at 05:12

## 2018-12-11 RX ADMIN — OMEGA-3 FATTY ACIDS CAP 1000 MG 1 CAPSULE: 1000 CAP at 08:12

## 2018-12-11 RX ADMIN — AMLODIPINE BESYLATE 5 MG: 5 TABLET ORAL at 10:12

## 2018-12-11 RX ADMIN — PROPOFOL 50 MCG/KG/MIN: 10 INJECTION, EMULSION INTRAVENOUS at 01:12

## 2018-12-11 RX ADMIN — COLLAGENASE SANTYL: 250 OINTMENT TOPICAL at 12:12

## 2018-12-11 RX ADMIN — ASPIRIN 81 MG 81 MG: 81 TABLET ORAL at 10:12

## 2018-12-11 RX ADMIN — NALOXONE HYDROCHLORIDE 0.4 MG: 1 INJECTION PARENTERAL at 01:12

## 2018-12-11 RX ADMIN — BRIMONIDINE TARTRATE 1 DROP: 1 SOLUTION/ DROPS OPHTHALMIC at 08:12

## 2018-12-11 RX ADMIN — MEROPENEM AND SODIUM CHLORIDE 1 G: 1 INJECTION, SOLUTION INTRAVENOUS at 01:12

## 2018-12-11 RX ADMIN — PROPOFOL 40 MCG/KG/MIN: 10 INJECTION, EMULSION INTRAVENOUS at 10:12

## 2018-12-11 RX ADMIN — EZETIMIBE 10 MG: 10 TABLET ORAL at 10:12

## 2018-12-11 RX ADMIN — Medication 10 ML: at 12:12

## 2018-12-11 RX ADMIN — EPINEPHRINE 1 MG: 0.1 INJECTION, SOLUTION ENDOTRACHEAL; INTRACARDIAC; INTRAVENOUS at 01:12

## 2018-12-11 RX ADMIN — MEROPENEM AND SODIUM CHLORIDE 1 G: 1 INJECTION, SOLUTION INTRAVENOUS at 10:12

## 2018-12-11 RX ADMIN — Medication: at 08:12

## 2018-12-11 RX ADMIN — OMEGA-3 FATTY ACIDS CAP 1000 MG 1 CAPSULE: 1000 CAP at 10:12

## 2018-12-11 RX ADMIN — NOREPINEPHRINE-DEXTROSE IV SOLUTION 4 MG/250ML-5% 0.08 MCG/KG/MIN: 4-5/250 SOLUTION at 07:12

## 2018-12-11 RX ADMIN — CLOPIDOGREL BISULFATE 600 MG: 300 TABLET, FILM COATED ORAL at 04:12

## 2018-12-11 RX ADMIN — SODIUM CHLORIDE 500 ML/HR: 0.9 INJECTION, SOLUTION INTRAVENOUS at 01:12

## 2018-12-11 NOTE — PLAN OF CARE
Problem: Fall Injury Risk  Goal: Absence of Fall and Fall-Related Injury  Outcome: Ongoing (interventions implemented as appropriate)  Intervention: Identify and Manage Contributors to Fall Injury Risk   12/10/18 1930 12/11/18 0542   Manage Acute Allergic Reaction   Medication Review/Management --  medications reviewed   Identify and Manage Contributors to Fall Injury Risk   Self-Care Promotion independence encouraged;BADL personal objects within reach --      Intervention: Promote Injury-Free Environment   12/11/18 0400   Optimize Balance and Safe Activity   Safety Promotion/Fall Prevention assistive device/personal item within reach;bed alarm set;commode/urinal/bedpan at bedside;Fall Risk reviewed with patient/family;Fall Risk signage in place;medications reviewed;nonskid shoes/socks when out of bed;side rails raised x 2;toileting scheduled;instructed to call staff for mobility         Problem: Adult Inpatient Plan of Care  Goal: Plan of Care Review  Outcome: Ongoing (interventions implemented as appropriate)   12/11/18 0542   Plan of Care Review   Plan of Care Reviewed With patient       Problem: Diabetes Comorbidity  Goal: Blood Glucose Level Within Desired Range  Outcome: Ongoing (interventions implemented as appropriate)  Intervention: Maintain Glycemic Control   12/11/18 0542   Monitor and Manage Ketoacidosis   Glycemic Management blood glucose monitoring;other (see comments)  (supplemental foods given)         Problem: Wound  Goal: Optimal Wound Healing    Intervention: Promote Effective Wound Healing   12/11/18 0542   Monitor and Manage Anemia   Oral Nutrition Promotion (diabetic boost provided)   Prevent or Manage Pain   Pain Management Interventions pain management plan reviewed with patient/caregiver         Problem: Infection  Goal: Infection Symptom Resolution    Intervention: Prevent or Manage Infection   12/11/18 0542   Prevent or Manage Infection   Fever Reduction/Comfort Measures medication  administered   Infection Management aseptic technique maintained   Plan of care reviewed with patient. PICC line intact, iv Merapenem given. Dressings dry and intact to left & right foot

## 2018-12-11 NOTE — SUBJECTIVE & OBJECTIVE
Past Medical History:   Diagnosis Date    Arthritis     Cellulitis     CKD (chronic kidney disease), stage III     Coronary artery disease     Diabetes mellitus     Diabetic retinopathy     Diabetic ulcer of left foot     Glaucoma     Gout     Hyperlipemia     Hypertension     ICD (implantable cardioverter-defibrillator) in place 11/02/2018    Left chest    Non-pressure chronic ulcer of other part of left foot with fat layer exposed 10/23/2018    PVD (peripheral vascular disease)     Type 2 diabetes mellitus with left diabetic foot ulcer 10/29/2018    Unsteady gait     uses a wheelchair       Past Surgical History:   Procedure Laterality Date    AHMED GLAUCOMA IMPLANT Left 2011    DONE AT [facility]    AMPUTATION, TOE Left 12/5/2018    Performed by Mitch Chan MD at [facility] OR    AMPUTATION, TOES  2-5 Left 11/16/2018    Performed by Mitch Chan MD at [facility] OR    BAERVELDT GLAUCOMA IMPLANT Left 2012    WITH CATARACT EXTRACTION//DONE AT [facility]    CARDIAC CATHETERIZATION Left 05/2016    CARDIAC DEFIBRILLATOR PLACEMENT Left 11/02/2018    CATARACT EXTRACTION W/  INTRAOCULAR LENS IMPLANT Left 2012    WITH BAERVEDT//DONE AT [facility]    CATARACT EXTRACTION W/  INTRAOCULAR LENS IMPLANT Right 09/26/2018    COMPLEX ()    CB DESTRUCTION WITH CYCLO G6 Left 02/15/2017        CYST REMOVAL      Exam under anesthesia, left foot debridement, washout and all other indicated procedures Left 12/5/2018    Performed by Mitch Chan MD at [facility] OR    EXAMINATION UNDER ANESTHESIA Left 12/5/2018    Procedure: Exam under anesthesia, left foot debridement, washout and all other indicated procedures;  Surgeon: Mitch Chan MD;  Location: [facility] OR;  Service: Vascular;  Laterality: Left;  1030AM START  RN PREOP 12/3/2018-----AICD  SEEN BY DR JAMES 12/4    HEART CATH-LEFT N/A 5/6/2016    Performed by Mike Magana MD at [facility] CATH LAB    INCISION AND DRAINAGE Left  11/14/2018    Procedure: Incision and Drainage, left lower extremity debridement, washout;  Surgeon: Mitch Chan MD;  Location: [facility] OR;  Service: Vascular;  Laterality: Left;    INCISION AND DRAINAGE Left 11/16/2018    Procedure: INCISION AND DRAINAGE;  Surgeon: Mitch Chan MD;  Location: [facility] OR;  Service: Vascular;  Laterality: Left;    INCISION AND DRAINAGE Left 11/16/2018    Performed by Mitch Chan MD at [facility] OR    Incision and Drainage, left lower extremity debridement, washout Left 11/14/2018    Performed by Mitch Chan MD at [facility] OR    INSERTION, ICD GENERATOR, SINGLE CHAMBER N/A 11/2/2018    Performed by Pernell Greer MD at [facility] CATH LAB    INSERTION, IOL PROSTHESIS Right 9/26/2018    Performed by Perla Cortés MD at [facility] OR [address] [place] [address]    INTRAOCULAR PROSTHESES INSERTION Right 9/26/2018    Procedure: INSERTION, IOL PROSTHESIS;  Surgeon: Perla Cortés MD;  Location: [facility] OR [address] [place] [address];  Service: Ophthalmology;  Laterality: Right;    PHACOEMULSIFICATION OF CATARACT Right 9/26/2018    Procedure: PHACOEMULSIFICATION, CATARACT;  Surgeon: Perla Cortés MD;  Location: [facility] OR [address] [place] [address];  Service: Ophthalmology;  Laterality: Right;    PHACOEMULSIFICATION, CATARACT Right 9/26/2018    Performed by Perla Cortés MD at [facility] OR [address] [place] [address]    Right foot surgery  10/2014    TOE AMPUTATION Right     first and second    TOE AMPUTATION Left 11/16/2018    Procedure: AMPUTATION, TOES  2-5;  Surgeon: Mitch Chan MD;  Location: [facility] OR;  Service: Vascular;  Laterality: Left;    TOE AMPUTATION Left 12/5/2018    Procedure: AMPUTATION, TOE;  Surgeon: Mitch Chan MD;  Location: [facility] OR;  Service: Vascular;  Laterality: Left;  left great toe    TONSILLECTOMY      TRABECULECTOMY/G 6 LASER Left 2/15/2017    Performed by Perla Cortés MD at [facility] OR [address] [place] [address]       Review of patient's allergies indicates:   Allergen Reactions     "Statins-hmg-coa reductase inhibitors      Generalized Pain    Onglyza [saxagliptin]     Penicillins Rash       Current Facility-Administered Medications on File Prior to Encounter   Medication    [COMPLETED] meropenem-0.9% sodium chloride 1 g/50 mL IVPB     Current Outpatient Medications on File Prior to Encounter   Medication Sig    allopurinol (ZYLOPRIM) 100 MG tablet Take 2 tablets (200 mg total) by mouth once daily.    amLODIPine (NORVASC) 5 MG tablet TAKE 1 TABLET(5 MG) BY MOUTH EVERY DAY    aspirin 81 MG Chew Take 81 mg by mouth once daily.    benazepril (LOTENSIN) 40 MG tablet TAKE 1 TABLET(40 MG) BY MOUTH TWICE DAILY    brimonidine 0.1% (ALPHAGAN P) 0.1 % Drop Place 1 drop into both eyes 3 (three) times daily.    carvedilol (COREG) 6.25 MG tablet Take 1 tablet (6.25 mg total) by mouth 2 (two) times daily.    coenzyme Q10 100 mg capsule Take 100 mg by mouth every morning.    collagenase (SANTYL) ointment Apply topically once daily.    dorzolamide-timolol 2-0.5% (COSOPT) 22.3-6.8 mg/mL ophthalmic solution Place 1 drop into both eyes 3 (three) times daily.    ezetimibe (ZETIA) 10 mg tablet Take 1 tablet (10 mg total) by mouth once daily.    fish oil-omega-3 fatty acids 300-1,000 mg capsule Take 2 capsules (2 g total) by mouth 2 (two) times daily. (Patient taking differently: Take 1 g by mouth 2 (two) times daily. )    furosemide (LASIX) 40 MG tablet Take 1 tablet (40 mg total) by mouth 2 (two) times daily.    insulin glargine-lixisenatide (SOLIQUA 100/33) 100 unit-33 mcg/mL InPn Inject 34 units once daily    ketorolac 0.5% (ACULAR) 0.5 % Drop Place 1 drop into the right eye 3 (three) times daily.    MULTIVIT,THER IRON,CA,FA & MIN (MULTIVITAMIN) Tab Take 1 tablet by mouth every morning.     oxyCODONE-acetaminophen (PERCOCET) 5-325 mg per tablet Take 1 tablet by mouth every 6 (six) hours as needed for Pain.    pen needle, diabetic 31 gauge x 1/4" Ndle Use as directed     Family History     " Problem Relation (Age of Onset)    Diabetes Mother    Heart disease Brother    No Known Problems Father, Sister, Maternal Aunt, Maternal Uncle, Paternal Aunt, Paternal Uncle, Maternal Grandmother, Maternal Grandfather, Paternal Grandmother, Paternal Grandfather        Tobacco Use    Smoking status: Former Smoker     Packs/day: 1.00     Years: 3.00     Pack years: 3.00     Types: Cigarettes     Last attempt to quit: 1984     Years since quittin.4    Smokeless tobacco: Never Used   Substance and Sexual Activity    Alcohol use: Yes     Alcohol/week: 0.6 oz     Types: 1 Cans of beer per week     Comment: Occasional    Drug use: No    Sexual activity: Not Currently     Review of Systems   Constitutional: Negative for chills and fever.   Eyes: Negative for photophobia and visual disturbance.   Respiratory: Negative for cough and shortness of breath.    Cardiovascular: Negative for chest pain, palpitations and leg swelling.   Gastrointestinal: Negative for abdominal pain, diarrhea, nausea and vomiting.   Genitourinary: Negative for frequency, hematuria and urgency.   Skin: Positive for wound. Negative for pallor and rash.   Neurological: Negative for light-headedness and headaches.   Psychiatric/Behavioral: Negative for confusion and decreased concentration.     Objective:     Vital Signs (Most Recent):  Temp: 97.7 °F (36.5 °C) (12/10/18 1937)  Pulse: 101 (12/10/18 1937)  Resp: 18 (12/10/18 1937)  BP: 118/61 (12/10/18 1937)  SpO2: (!) 94 % (12/10/18 1937) Vital Signs (24h Range):  Temp:  [97.6 °F (36.4 °C)-98.3 °F (36.8 °C)] 97.7 °F (36.5 °C)  Pulse:  [] 101  Resp:  [18] 18  SpO2:  [92 %-97 %] 94 %  BP: (116-123)/(61-71) 118/61     Weight: 110.2 kg (242 lb 15.2 oz)  Body mass index is 34.86 kg/m².    Physical Exam   Constitutional: He is oriented to person, place, and time. He appears well-developed and well-nourished. No distress.   HENT:   Head: Normocephalic and atraumatic.   Right Ear: External ear  normal.   Left Ear: External ear normal.   Nose: Nose normal.   Mouth/Throat: Oropharynx is clear and moist.   Eyes: Conjunctivae and EOM are normal. Pupils are equal, round, and reactive to light.   Neck: Normal range of motion. Neck supple.   Cardiovascular: Normal rate, regular rhythm and intact distal pulses.   Pulmonary/Chest: Effort normal and breath sounds normal. No respiratory distress. He has no wheezes. He has no rales.   Abdominal: Soft. Bowel sounds are normal. He exhibits no distension. There is no tenderness.   No palpable hepatomegaly or splenomegaly   Musculoskeletal: Normal range of motion. He exhibits no edema or tenderness.   Dressings to bilat lower ext c/d/i; recent left great toe amputation   Neurological: He is alert and oriented to person, place, and time.   Skin: Skin is warm and dry. There is pallor.   Psychiatric: He has a normal mood and affect. Thought content normal.   Nursing note and vitals reviewed.        CRANIAL NERVES     CN III, IV, VI   Pupils are equal, round, and reactive to light.  Extraocular motions are normal.        Significant Labs: All pertinent labs within the past 24 hours have been reviewed.    Significant Imaging: I have reviewed all pertinent imaging results/findings within the past 24 hours.

## 2018-12-11 NOTE — HPI
58 y.o. male with DM2, HLD, HTN, CAD, chronic combined heart failure, CKD stage III presents with a complaint of left foot wound infection.  Prior cultures show only sensitive to meropenem.  Outpatient IV antibiotics were attempted to be setup last night and throughout the day today through his PCP but were unable to be procured.  He denies fever, chills, cough, SOB, chest pain, palpitations, headaches, vision changes, N/V/D, abdominal pain, or dysuria.  Placed in observation for PICC line placement and to setup home IV abx.

## 2018-12-11 NOTE — ASSESSMENT & PLAN NOTE
Well controlled, continue home medications and monitor blood pressure, adjust as needed.Hold lisinopril and lasix due to renal function.

## 2018-12-11 NOTE — ASSESSMENT & PLAN NOTE
Last HgbA1c   Lab Results   Component Value Date    HGBA1C 8.3 (H) 10/30/2018     Hold oral antihyperglycemics while inpatient  PRN sliding scale insulin  ACHS glucose monitoring   ADA diet

## 2018-12-11 NOTE — HPI
58 y.o. male with DM2, HLD, HTN, CAD, chronic combined heart failure, CKD stage III presents with a complaint of left foot wound infection.  Prior cultures show only sensitive to meropenem.  Outpatient IV antibiotics were attempted to be setup last night and throughout the day today through his PCP but were unable to be procured.  He denies fever, chills, cough, SOB, chest pain, palpitations, headaches, vision changes, N/V/D, abdominal pain, or dysuria.  Placed in observation for PICC line placement and to setup home IV abx.    Pt follows with Dr. Greer, has [facility] ICD, recent device check 12/2018 without incident.  Now admitted for PICC line placement to receive IV abx for foot infxn.  Had cardiac arrest while awaiting discharge (not on tele).  [facility] called to interrogate device.  On review of records, pt had cath 2016 with diffuse 3V CAD and severe LV dysfxn.  He was turned down for both CABG and PCI.  He also has PAD and is followed by Dr. Chan.

## 2018-12-11 NOTE — PROCEDURES
"Marvin Ray is a 58 y.o. male patient.    Temp: 98 °F (36.7 °C) (12/11/18 1131)  Pulse: 67 (12/11/18 1445)  Resp: (!) 23 (12/11/18 1445)  BP: (!) 87/51 (12/11/18 1445)  SpO2: 96 % (12/11/18 1445)  Weight: 110.2 kg (242 lb 15.2 oz) (12/10/18 1937)  Height: 5' 10" (177.8 cm) (12/10/18 1937)       Arterial Line  Date/Time: 12/11/2018 3:12 PM  Location procedure was performed: [facility] ICU  Performed by: Chan Kim PA-C  Authorized by: Chan Kim PA-C   Pre-op Diagnosis: shock  Post-operative diagnosis: shock  Consent Done: Emergent Situation  Preparation: Patient was prepped and draped in the usual sterile fashion.  Indications: multiple ABGs, respiratory failure and hemodynamic monitoring  Location: left radial  Christopher's test normal: yes  Needle gauge: 20  Seldinger technique: Seldinger technique used  Number of attempts: 1  Complications: No  Estimated blood loss (mL): 1  Specimens: No  Implants: No  Post-procedure: dressing applied  Post-procedure CMS: normal  Patient tolerance: Patient tolerated the procedure well with no immediate complications  Comments: Emergent (implied) consent was performed for this procedure because the patient was not able to consent themselves due to severe encephalopathy. There were no available surrogates with whom to discuss the risks, benefits, and alternatives of the procedure known to me at the time. Without immediate intervention, I believe the patient would suffer an immediate danger of death or permanent impairment of health.             Chan Kim  12/11/2018  "

## 2018-12-11 NOTE — CONSULTS
[facility]  Pulmonology  Consult Note    Patient Name: Marvin Ray  MRN: [id number]  Admission Date: 12/10/2018  Hospital Length of Stay: 0 days  Code Status: Full Code  Attending Physician: Jazz Sotomayor MD  Primary Care Provider: Rubén Davis MD   Principal Problem: Cardiac arrest    Consults  Subjective:     HPI:  Mr. Ray iss a 59 y/o male who was admitted on 12/10/18 for a left foot infection (cultures have grown Acenitobacter and E faecalis). Attempts were made to place a PICC and start meropenem as an outpatient, but he was not able to have the PICC placed until Wednesday so he presented to the ED. He was admitted to Observation and a PICC was placed. Shortly before discharge, Mr. Ray was noted to be in cardiac arrest. There were no clear precipitating events and he was not on telemetry (although he does have an AICD in place). The code lasted ~5 minutes with ROSC after 1 epinephrine. He was subsequently intubated and transferred to the ICU.     Past Medical History:   Diagnosis Date    Arthritis     Cellulitis     CKD (chronic kidney disease), stage III     Coronary artery disease     Diabetes mellitus     Diabetic retinopathy     Diabetic ulcer of left foot     Glaucoma     Gout     Hyperlipemia     Hypertension     ICD (implantable cardioverter-defibrillator) in place 11/02/2018    Left chest    Non-pressure chronic ulcer of other part of left foot with fat layer exposed 10/23/2018    PVD (peripheral vascular disease)     Type 2 diabetes mellitus with left diabetic foot ulcer 10/29/2018    Unsteady gait     uses a wheelchair       Past Surgical History:   Procedure Laterality Date    AHMED GLAUCOMA IMPLANT Left 2011    DONE AT [facility]    AMPUTATION, TOE Left 12/5/2018    [facility] OR    AMPUTATION, TOES  2-5 Left 11/16/2018    [facility] at [facility] OR    BAERVELDT GLAUCOMA IMPLANT Left 2012     WITH CATARACT EXTRACTION//DONE AT [facility]    CARDIAC CATHETERIZATION Left 05/2016    CARDIAC DEFIBRILLATOR PLACEMENT Left 11/02/2018    CATARACT EXTRACTION W/  INTRAOCULAR LENS IMPLANT Left 2012    WITH BAERVEDT//DONE AT [facility]    CATARACT EXTRACTION W/  INTRAOCULAR LENS IMPLANT Right 09/26/2018    COMPLEX ()    CB DESTRUCTION WITH CYCLO G6 Left 02/15/2017        CYST REMOVAL      Exam under anesthesia, left foot debridement, washout and all other indicated procedures Left 12/5/2018    Performed by [facility] at [facility] OR    EXAMINATION UNDER ANESTHESIA Left 12/5/2018    Procedure: Exam under anesthesia, left foot debridement, washout and all other indicated procedures;  Surgeon: Mitch Chan MD;  Location: [facility] OR;  Service: Vascular;  Laterality: Left;  1030AM START  RN PREOP 12/3/2018-----AICD  SEEN BY DR GREER 12/4    HEART CATH-LEFT N/A 5/6/2016    Performed by [facility] Magana [facility] at [facility] CATH LAB    INCISION AND DRAINAGE Left 11/14/2018    Procedure: Incision and Drainage, left lower extremity debridement, washout;  Surgeon: Mitch Chan MD;  Location: [facility] OR;  Service: Vascular;  Laterality: Left;    INCISION AND DRAINAGE Left 11/16/2018    Procedure: INCISION AND DRAINAGE;  Surgeon: Mitch Chan MD;  Location: [facility] OR;  Service: Vascular;  Laterality: Left;    INCISION AND DRAINAGE Left 11/16/2018    Performed by Mitch Chan MD at [facility] OR    Incision and Drainage, left lower extremity debridement, washout Left 11/14/2018    Performed by Mitch Chan [facility] at [facility] OR    INSERTION, ICD GENERATOR, SINGLE CHAMBER N/A 11/2/2018    Performed by Pernell Greer MD at [facility] CATH LAB    INSERTION, IOL PROSTHESIS Right 9/26/2018    Performed by Perla Cortés MD at [facility] OR 1ST FLR    INTRAOCULAR PROSTHESES INSERTION Right 9/26/2018    Procedure: INSERTION, IOL PROSTHESIS;  Surgeon: Perla Cortés MD;  Location:  [facility] OR [address] [place] [address];  Service: Ophthalmology;  Laterality: Right;    PHACOEMULSIFICATION OF CATARACT Right 2018    Procedure: PHACOEMULSIFICATION, CATARACT;  Surgeon: Perla Cortés MD;  Location: [facility] OR [address] [place] [address];  Service: Ophthalmology;  Laterality: Right;    PHACOEMULSIFICATION, CATARACT Right 2018    Performed by Perla Cortés MD at [facility] OR [address] [place] [address]    Right foot surgery  10/2014    TOE AMPUTATION Right     first and second    TOE AMPUTATION Left 2018    Procedure: AMPUTATION, TOES  2-5;  Surgeon: Mitch Chan MD;  Location: [facility];  Service: Vascular;  Laterality: Left;    TOE AMPUTATION Left 2018    Procedure: AMPUTATION, TOE;  Surgeon: Mitch Chan MD;  Location: [facility] OR;  Service: Vascular;  Laterality: Left;  left great toe    TONSILLECTOMY      TRABECULECTOMY/G 6 LASER Left 2/15/2017    Performed by Perla Cortés MD at [facility] OR [address] [place] [address]       Review of patient's allergies indicates:   Allergen Reactions    Statins-hmg-coa reductase inhibitors      Generalized Pain    Onglyza [saxagliptin]     Penicillins Rash       Family History     Problem Relation (Age of Onset)    Diabetes Mother    Heart disease Brother    No Known Problems Father, Sister, Maternal Aunt, Maternal Uncle, Paternal Aunt, Paternal Uncle, Maternal Grandmother, Maternal Grandfather, Paternal Grandmother, Paternal Grandfather        Tobacco Use    Smoking status: Former Smoker     Packs/day: 1.00     Years: 3.00     Pack years: 3.00     Types: Cigarettes     Last attempt to quit: 1984     Years since quittin.4    Smokeless tobacco: Never Used   Substance and Sexual Activity    Alcohol use: Yes     Alcohol/week: 0.6 oz     Types: 1 Cans of beer per week     Comment: Occasional    Drug use: No    Sexual activity: Not Currently         Review of Systems   Unable to perform ROS: Intubated     Objective:     Vital Signs (Most Recent):  Temp: 98 °F (36.7 °C) (18  1131)  Pulse: 72 (12/11/18 1520)  Resp: (!) 27 (12/11/18 1520)  BP: (!) 87/51 (12/11/18 1445)  SpO2: (!) 92 % (12/11/18 1520) Vital Signs (24h Range):  Temp:  [97.7 °F (36.5 °C)-98.9 °F (37.2 °C)] 98 °F (36.7 °C)  Pulse:  [] 72  Resp:  [18-33] 27  SpO2:  [87 %-100 %] 92 %  BP: ()/(43-71) 87/51     Weight: 110.2 kg (242 lb 15.2 oz)  Body mass index is 34.86 kg/m².      Intake/Output Summary (Last 24 hours) at 12/11/2018 1528  Last data filed at 12/11/2018 0600  Gross per 24 hour   Intake 940 ml   Output 700 ml   Net 240 ml       Physical Exam   Constitutional: He appears well-developed and well-nourished. He is intubated.   HENT:   Head: Normocephalic and atraumatic.   Mouth/Throat: Oropharynx is clear and moist.   Eyes: Conjunctivae are normal. Pupils are equal, round, and reactive to light. Right eye exhibits no discharge. Left eye exhibits no discharge. No scleral icterus.   Neck: Trachea normal, normal range of motion and full passive range of motion without pain. Neck supple. No JVD present. No tracheal deviation present. No thyromegaly present.   Cardiovascular: Normal rate, regular rhythm, S1 normal, S2 normal, normal heart sounds and intact distal pulses. Exam reveals no gallop and no friction rub.   No murmur heard.  Pulmonary/Chest: Breath sounds normal. He is intubated. No respiratory distress. He has no wheezes. He has no rales. He exhibits no tenderness.   Abdominal: Soft. Bowel sounds are normal. He exhibits distension. He exhibits no mass. There is no tenderness. There is no rebound and no guarding.   Musculoskeletal: Normal range of motion. He exhibits no tenderness or deformity.   Lymphadenopathy:     He has no cervical adenopathy.   Neurological: No cranial nerve deficit. Coordination normal.   Skin: Skin is warm and dry. No abrasion and no bruising noted.   Psychiatric: He has a normal mood and affect.   Vitals reviewed.      Vents:  Vent Mode: PRVC (12/11/18 1520)  Ventilator  Initiated: Yes (12/11/18 1413)  Set Rate: 16 bmp (12/11/18 1520)  Vt Set: 460 mL (12/11/18 1520)  PEEP/CPAP: 8 cmH20 (12/11/18 1520)  Oxygen Concentration (%): 40 (12/11/18 1520)  Peak Airway Pressure: 28.4 cmH2O (12/11/18 1520)  Total Ve: 11.2 mL (12/11/18 1520)  F/VT Ratio<105 (RSBI): (!) 58.44 (12/11/18 1520)    Lines/Drains/Airways     Peripherally Inserted Central Catheter Line                 PICC Double Lumen 12/10/18 2241 right basilic less than 1 day          Drain                 Open Drain 11/14/18 1353 Left Foot Other (see comments) Other (see comments) 27 days         NG/OG Tube 12/11/18 1350 orogastric 7.5 Fr. Center mouth less than 1 day         Urethral Catheter 12/11/18 1351 Double-lumen 16 Fr. less than 1 day          Airway                 Airway - Non-Surgical 11/16/18 0815 Other (Comment) 25 days         Airway - Non-Surgical 12/11/18 1320 Endotracheal Tube less than 1 day                Significant Labs:    CBC/Anemia Profile:  Recent Labs   Lab 12/10/18  1718 12/11/18  0604 12/11/18  1406   WBC 7.18 8.68 8.50   HGB 9.2* 8.5* 8.6*   HCT 28.2* 26.6* 26.2*    327 297   MCV 94 95 95   RDW 16.8* 16.9* 16.8*        Chemistries:  Recent Labs   Lab 12/09/18  1905 12/10/18  1718 12/11/18  0604    139 139   K 4.3 4.0 4.1    106 106   CO2 26 26 26   BUN 59* 56* 58*   CREATININE 1.0 1.1 1.2   CALCIUM 8.8 8.7 8.5*   ALBUMIN 2.2*  --  2.1*   PROT 7.0  --  6.8   BILITOT 0.4  --  0.5   ALKPHOS 127  --  128   ALT 18  --  16   AST 21  --  20     Significant Imaging:   I have reviewed and interpreted all pertinent imaging results/findings within the past 24 hours.    Assessment/Plan:     * Cardiac arrest    --Uncertain etiology. Pt was noted to be in asystole at first rhythm check, but CPR started beforehand.   --Hold on CTA given lack of significant hypoxia, although keep PE in the differential. Stat LE DVT study.   --Stat EKG unremarkable for ST changes or T wave inversion. Send stat  troponin.   --Check 2D Echo.   --AICD interrogation ordered.        Shock    --Undifferentiated at this point. May be due to sedatives. Stop carvedilol.   --2D echo, trend troponin, etc.  --Place arterial line.   --Titrate norepinephrine to keep MAP>65mmHg.   --High risk for worsening in need of close monitoring.      Acute hypoxemic respiratory failure    --Wean vent to PRVC 440, PEEP 10, FiO2 40%.  --ABG w/o significant hypercapnia or hypoxia.   --Wean ventilator as tolerated. May be able to extubate as soon as tomorrow depending on his clinical course overnight.        Critical Care time (not including procedures): 55 minutes      Thank you for your consult. Pulmonary/Critical Care will follow-up with patient. Please contact us if you have any additional questions.     Chan Kim PA-C  Pulmonology  [facility]

## 2018-12-11 NOTE — ASSESSMENT & PLAN NOTE
Tissue culture obtained by Vascular Surgery obtained 12/5/18 grew Acinetobacter baumannii/haemolyticus sensitive to meropenem.  continue IV meropenem.  Place PICC and setup outpatient abx.

## 2018-12-11 NOTE — PROGRESS NOTES
Admitted to ICU post cardiac arrest. Patient has ICD but was not on telemetry. Received ~5 min chest compressions, epi x1, and narcan (patient received pain Rx prior to code). Post code he moved all extremities but was not following commands or protecting airway. Intubated by Dr Jarvis. Now in ICU. Started on levophed for hypotension which may be related to propofol needed for sedation.     Plan    1. Cardiac arrest  - patient with history of CHF and CAD 3v (no intervention, turned down for PCI and CABG) with AICD in place.   - EKG with TWI in I, II, III, aVF, V5, and V6. These are new compared to prior. Troponin 0.5, trend Q6. Will start NSTEMI protocol with asa 325 x1, plavix 600 x1 then 75 daily, weight based lovenox. Unable to use statin due to allergy  - ICD interrogation ordered. TTE ordered BNP 2503- will start diuresis with lasix 40mg IV daily.  - Cardiology consulted  - check bilateral LE US for DVT-- negative  - check blood cultures x2 as patient was receiving antibiotics for DM foot wounds-- pending     2. Mechanical ventilation  - intubated. CXR showing hypoinflated lungs which may be due to abdominal distension. Pulmonary following for vent management. SBT in AM. Propofol for sedation.     3. Abdominal distension  - RUQ US with doppler ordered- mod ascites. Consented for paracentesis- will do tomorrow  - prior 10/2018 diagnosis of HSM on US with ascites and SAAG 0.9. No etiology for HSM noted  - stat CT does not show free air    4. DM foot wounds  - wound care consulted  - continue meropenem via PICC for Acinetobacter  - blood cultures x2  - glucose controlled, monitor Q6  - ID will see patient tomorrow       Jazz Sotomayor MD  12/11/2018 4:18 PM

## 2018-12-11 NOTE — CONSULTS
[facility]  Cardiology  Consult Note    Patient Name: Marvin Ray  MRN: [id number]  Admission Date: 12/10/2018  Hospital Length of Stay: 0 days  Code Status: Full Code   Attending Provider: Jazz Sotomayor MD   Consulting Provider: Ismael Romano MD  Primary Care Physician: Rubén Davis MD  Principal Problem:Cardiac arrest    Patient information was obtained from patient and ER records.     Inpatient consult to Cardiology  Consult performed by: Ismael Romano MD  Consult ordered by: Jazz Sotomayor MD  Reason for consult: card arrest        Subjective:     Chief Complaint:  Card arrest     HPI:   58 y.o. male with DM2, HLD, HTN, CAD, chronic combined heart failure, CKD stage III presents with a complaint of left foot wound infection.  Prior cultures show only sensitive to meropenem.  Outpatient IV antibiotics were attempted to be setup last night and throughout the day today through his PCP but were unable to be procured.  He denies fever, chills, cough, SOB, chest pain, palpitations, headaches, vision changes, N/V/D, abdominal pain, or dysuria.  Placed in observation for PICC line placement and to setup home IV abx.    Pt follows with Dr. Greer, has [employer] ICD, recent device check 12/2018 without incident.  Now admitted for PICC line placement to receive IV abx for foot infxn.  Had cardiac arrest while awaiting discharge (not on tele).  [facility] called to interrogate device.  On review of records, pt had cath 2016 with diffuse 3V CAD and severe LV dysfxn.  He was turned down for both CABG and PCI.  He also has PAD and is followed by Dr. Chan.    Past Medical History:   Diagnosis Date    Arthritis     Cellulitis     CKD (chronic kidney disease), stage III     Coronary artery disease     Diabetes mellitus     Diabetic retinopathy     Diabetic ulcer of left foot     Glaucoma     Gout     Hyperlipemia     Hypertension     ICD (implantable cardioverter-defibrillator)  in place 11/02/2018    Left chest    Non-pressure chronic ulcer of other part of left foot with fat layer exposed 10/23/2018    PVD (peripheral vascular disease)     Type 2 diabetes mellitus with left diabetic foot ulcer 10/29/2018    Unsteady gait     uses a wheelchair       Past Surgical History:   Procedure Laterality Date    AHMED GLAUCOMA IMPLANT Left 2011    DONE AT [facility]    AMPUTATION, TOE Left 12/5/2018    Performed by Mitch Chan MD at [facility] OR    AMPUTATION, TOES  2-5 Left 11/16/2018    Performed by Mitch Chan MD at [facility] OR    BAERVELDT GLAUCOMA IMPLANT Left 2012    WITH CATARACT EXTRACTION//DONE AT [facility]    CARDIAC CATHETERIZATION Left 05/2016    CARDIAC DEFIBRILLATOR PLACEMENT Left 11/02/2018    CATARACT EXTRACTION W/  INTRAOCULAR LENS IMPLANT Left 2012    WITH BAERVEDT//DONE AT [facility]    CATARACT EXTRACTION W/  INTRAOCULAR LENS IMPLANT Right 09/26/2018    COMPLEX ()    CB DESTRUCTION WITH CYCLO G6 Left 02/15/2017        CYST REMOVAL      Exam under anesthesia, left foot debridement, washout and all other indicated procedures Left 12/5/2018    Performed by Mitch Chan MD at [facility] OR    EXAMINATION UNDER ANESTHESIA Left 12/5/2018    Procedure: Exam under anesthesia, left foot debridement, washout and all other indicated procedures;  Surgeon: Mitch Chan MD;  Location: [facility] OR;  Service: Vascular;  Laterality: Left;  1030AM START  RN PREOP 12/3/2018-----AICD  SEEN BY DR JAMES 12/4    HEART CATH-LEFT N/A 5/6/2016    Performed by [facility] Magana [facility] at [facility] CATH LAB    INCISION AND DRAINAGE Left 11/14/2018    Procedure: Incision and Drainage, left lower extremity debridement, washout;  Surgeon: Mitch Chan MD;  Location: [facility] OR;  Service: Vascular;  Laterality: Left;    INCISION AND DRAINAGE Left 11/16/2018    Procedure: INCISION AND DRAINAGE;  Surgeon: Mitch Chan MD;  Location: [facility] OR;  Service:  Vascular;  Laterality: Left;    INCISION AND DRAINAGE Left 11/16/2018    Performed by Mitch Chan MD at [facility] OR    Incision and Drainage, left lower extremity debridement, washout Left 11/14/2018    Performed by Mitch Chan MD at [facility] OR    INSERTION, ICD GENERATOR, SINGLE CHAMBER N/A 11/2/2018    Performed by Pernell Greer MD at [facility] CATH LAB    INSERTION, IOL PROSTHESIS Right 9/26/2018    Performed by Perla Cortés MD at [facility] OR [address] [place] [address]    INTRAOCULAR PROSTHESES INSERTION Right 9/26/2018    Procedure: INSERTION, IOL PROSTHESIS;  Surgeon: Perla Cortés MD;  Location: [facility] OR [address] [place] [address];  Service: Ophthalmology;  Laterality: Right;    PHACOEMULSIFICATION OF CATARACT Right 9/26/2018    Procedure: PHACOEMULSIFICATION, CATARACT;  Surgeon: Perla Cortés MD;  Location: [facility] OR [address] [place] [address];  Service: Ophthalmology;  Laterality: Right;    PHACOEMULSIFICATION, CATARACT Right 9/26/2018    Performed by Perla Cortés MD at [facility] OR [address] [place] [address]    Right foot surgery  10/2014    TOE AMPUTATION Right     first and second    TOE AMPUTATION Left 11/16/2018    Procedure: AMPUTATION, TOES  2-5;  Surgeon: Mitch Chan MD;  Location: [facility] OR;  Service: Vascular;  Laterality: Left;    TOE AMPUTATION Left 12/5/2018    Procedure: AMPUTATION, TOE;  Surgeon: Mitch Chan MD;  Location: [facility] OR;  Service: Vascular;  Laterality: Left;  left great toe    TONSILLECTOMY      TRABECULECTOMY/G 6 LASER Left 2/15/2017    Performed by Perla Cortés MD at [facility] OR [address] [place] [address]       Review of patient's allergies indicates:   Allergen Reactions    Statins-hmg-coa reductase inhibitors      Generalized Pain    Onglyza [saxagliptin]     Penicillins Rash       No current facility-administered medications on file prior to encounter.      Current Outpatient Medications on File Prior to Encounter   Medication Sig    allopurinol (ZYLOPRIM) 100 MG tablet Take 2 tablets (200 mg total) by  "mouth once daily.    amLODIPine (NORVASC) 5 MG tablet TAKE 1 TABLET(5 MG) BY MOUTH EVERY DAY    aspirin 81 MG Chew Take 81 mg by mouth once daily.    carvedilol (COREG) 6.25 MG tablet Take 1 tablet (6.25 mg total) by mouth 2 (two) times daily.    coenzyme Q10 100 mg capsule Take 100 mg by mouth every morning.    collagenase (SANTYL) ointment Apply topically once daily.    dorzolamide-timolol 2-0.5% (COSOPT) 22.3-6.8 mg/mL ophthalmic solution Place 1 drop into both eyes 3 (three) times daily.    ezetimibe (ZETIA) 10 mg tablet Take 1 tablet (10 mg total) by mouth once daily.    fish oil-omega-3 fatty acids 300-1,000 mg capsule Take 2 capsules (2 g total) by mouth 2 (two) times daily. (Patient taking differently: Take 1 g by mouth 2 (two) times daily. )    insulin glargine-lixisenatide (SOLIQUA 100/33) 100 unit-33 mcg/mL InPn Inject 34 units once daily    ketorolac 0.5% (ACULAR) 0.5 % Drop Place 1 drop into the right eye 3 (three) times daily.    MULTIVIT,THER IRON,CA,FA & MIN (MULTIVITAMIN) Tab Take 1 tablet by mouth every morning.     oxyCODONE-acetaminophen (PERCOCET) 5-325 mg per tablet Take 1 tablet by mouth every 6 (six) hours as needed for Pain.    pen needle, diabetic 31 gauge x 1/4" Ndle Use as directed    [DISCONTINUED] benazepril (LOTENSIN) 40 MG tablet TAKE 1 TABLET(40 MG) BY MOUTH TWICE DAILY    [DISCONTINUED] brimonidine 0.1% (ALPHAGAN P) 0.1 % Drop Place 1 drop into both eyes 3 (three) times daily.    [DISCONTINUED] furosemide (LASIX) 40 MG tablet Take 1 tablet (40 mg total) by mouth 2 (two) times daily.     Family History     Problem Relation (Age of Onset)    Diabetes Mother    Heart disease Brother    No Known Problems Father, Sister, Maternal Aunt, Maternal Uncle, Paternal Aunt, Paternal Uncle, Maternal Grandmother, Maternal Grandfather, Paternal Grandmother, Paternal Grandfather        Tobacco Use    Smoking status: Former Smoker     Packs/day: 1.00     Years: 3.00     Pack years: " 3.00     Types: Cigarettes     Last attempt to quit: 1984     Years since quittin.4    Smokeless tobacco: Never Used   Substance and Sexual Activity    Alcohol use: Yes     Alcohol/week: 0.6 oz     Types: 1 Cans of beer per week     Comment: Occasional    Drug use: No    Sexual activity: Not Currently     Review of Systems   Unable to perform ROS: intubated     Objective:     Vital Signs (Most Recent):  Temp: 98 °F (36.7 °C) (18 1131)  Pulse: 72 (18 1615)  Resp: (!) 24 (18 1615)  BP: (!) 87/51 (18 1520)  SpO2: (!) 93 % (18 161) Vital Signs (24h Range):  Temp:  [97.7 °F (36.5 °C)-98.9 °F (37.2 °C)] 98 °F (36.7 °C)  Pulse:  [] 72  Resp:  [18-33] 24  SpO2:  [87 %-100 %] 93 %  BP: ()/(43-71) 87/51  Arterial Line BP: (122-128)/(52-56) 128/55     Weight: 109.8 kg (242 lb)  Body mass index is 34.72 kg/m².    SpO2: (!) 93 %  O2 Device (Oxygen Therapy): ventilator      Intake/Output Summary (Last 24 hours) at 2018 1657  Last data filed at 2018 1600  Gross per 24 hour   Intake 1010.61 ml   Output 700 ml   Net 310.61 ml       Lines/Drains/Airways     Peripherally Inserted Central Catheter Line                 PICC Double Lumen 12/10/18 2241 right basilic less than 1 day          Drain                 Open Drain 18 1353 Left Foot Other (see comments) Other (see comments) 27 days         NG/OG Tube 18 1350 orogastric 7.5 Fr. Center mouth less than 1 day         Urethral Catheter 18 1351 Double-lumen 16 Fr. less than 1 day          Airway                 Airway - Non-Surgical 18 0815 Other (Comment) 25 days         Airway - Non-Surgical 18 1320 Endotracheal Tube less than 1 day          Arterial Line                 Arterial Line 18 1530 Left Radial less than 1 day                Physical Exam   Constitutional: He appears well-developed and well-nourished.   HENT:   Head: Normocephalic and atraumatic.   Eyes: No scleral  icterus.   Neck: No JVD present. No thyromegaly present.   Cardiovascular: Normal rate, regular rhythm, S1 normal and S2 normal. Exam reveals distant heart sounds.   Pulmonary/Chest: Effort normal and breath sounds normal. No respiratory distress.   intub and sedated   Abdominal: Soft. He exhibits no distension.   Musculoskeletal: He exhibits no edema.   Neurological:   UTO   Skin: Skin is warm and dry.   Psychiatric:   UTO       Current Medications:   allopurinol  200 mg Oral Daily    aspirin  81 mg Oral Daily    aspirin  325 mg Oral Once    brimonidine 0.1%  1 drop Both Eyes BID    chlorhexidine  15 mL Mouth/Throat BID    clopidogrel  600 mg Oral Once    [START ON 12/12/2018] clopidogrel  75 mg Oral Daily    collagenase   Topical (Top) Daily    enoxaparin  1 mg/kg Subcutaneous Q12H    ezetimibe  10 mg Oral Daily    famotidine (PF)  20 mg Intravenous BID    [START ON 12/12/2018] furosemide  40 mg Intravenous Daily    meropenem (MERREM) IVPB  2 g Intravenous Q8H    miconazole NITRATE 2 %   Topical (Top) BID    omega 3-dha-epa-fish oil  1 capsule Oral BID    prednisoLONE acetate  1 drop Right Eye Daily    propofol        sodium chloride 0.9%  10 mL Intravenous Q6H      norepinephrine bitartrate-D5W 0.1 mcg/kg/min (12/11/18 1600)    propofol 50.06 mcg/kg/min (12/11/18 1600)     acetaminophen, dextrose 50%, dextrose 50%, glucagon (human recombinant), glucose, glucose, insulin aspart U-100, ondansetron, oxyCODONE-acetaminophen, ramelteon, Flushing PICC Protocol **AND** sodium chloride 0.9% **AND** sodium chloride 0.9%    Laboratory:  CBC:  Recent Labs   Lab 12/03/18  1130 12/09/18  1905 12/10/18  1718 12/11/18  0604 12/11/18  1406   WHITE BLOOD CELL COUNT 7.22 7.64 7.18 8.68 8.50   HEMOGLOBIN 9.6 L 9.0 L 9.2 L 8.5 L 8.6 L   HEMATOCRIT 30.0 L 27.6 L 28.2 L 26.6 L 26.2 L   PLATELETS 289 299 328 327 297       CHEMISTRIES:  Recent Labs   Lab 05/06/16  0548 05/07/16  0441 05/08/16  0606  12/03/18  1129  12/09/18  1905 12/10/18  1718 12/11/18  0604 12/11/18  1406   GLUCOSE 247 H 123 H 107   < > 122 H 97 74 76 132 H   SODIUM 139 140 140   < > 138 139 139 139 137   POTASSIUM 5.0 4.6 4.5   < > 4.1 4.3 4.0 4.1 4.4   BUN BLD 23 H 19 13   < > 59 H 59 H 56 H 58 H 60 H   CREATININE 1.0 0.8 0.8   < > 1.1 1.0 1.1 1.2 1.4   EGFR IF  >60.0 >60.0 >60.0   < > >60 >60 >60 >60 >60   EGFR IF NON- >60.0 >60.0 >60.0   < > >60 >60 >60 >60 54 A   CALCIUM 9.4 9.0 9.1   < > 8.6 L 8.8 8.7 8.5 L 8.2 L   MAGNESIUM 1.8 1.8 1.6  --  1.8  --   --   --  1.8    < > = values in this interval not displayed.       CARDIAC BIOMARKERS:  Recent Labs   Lab 05/05/16  2351 05/06/16  0548 10/12/16  1227 12/11/18  1406   CPK  --   --  93  --    TROPONIN I <0.006 0.013  --  0.532 H       COAGS:  Recent Labs   Lab 10/14/18  1059 10/30/18  0955 11/08/18  1154 12/03/18  1130 12/10/18  1718   INR 1.2 1.1 1.1 1.2 1.1       LIPIDS/LFTS:  Recent Labs   Lab 01/08/16  0952  05/08/16  0606 10/12/16  1227 07/27/18  0820  10/30/18  0955  11/21/18  0501 12/03/18  1129 12/09/18  1905 12/11/18  0604 12/11/18  1406   CHOLESTEROL 201 H  --  168 174 151  --  107 L  --   --   --   --   --   --    TRIGLYCERIDES 136  --  97 80 83  --  44  --   --   --   --   --   --    HDL 33 L  --  28 L 35 L 29 L  --  27 L  --   --   --   --   --   --    LDL CHOLESTEROL 140.8  --  120.6 123.0 105.4  --  71.2  --   --   --   --   --   --    NON-HDL CHOLESTEROL 168  --  140 139 122  --  80  --   --   --   --   --   --    AST 30   < > 9 L 16 27   < >  --    < > 17 33 21 20 29   ALT 12   < > 9 L 15 28   < >  --    < > 20 28 18 16 21    < > = values in this interval not displayed.       BNP:  Recent Labs   Lab 05/05/16  2351 12/11/18  1406    H 2,503 H       TSH:        Free T4:        Diagnostic Results:  ECG (personally reviewed tracings):  12/11/18 1359 SR 78, PRWP, NSSTTW changes, similar to 11/9/18    Chest X-Ray (personally reviewed image(s)):  12/11/18  1. Interval development of an air density curvilinear airspace opacity subjacent to the right hemidiaphragm suggest interval development of pneumoperitoneum.  Surgical consult/evaluation is indicated.  2.  Interval placement of an endotracheal tube in good position.  Remaining lines and leads are stable.  No pneumothorax.  3.  Bilateral perihilar and bibasilar airspace opacities likely reflect atelectasis however, superimposed infection cannot be excluded.    LE venous US 12/11/18  No evidence of deep venous thrombosis in either lower extremity.    Echo: 10/16/18 (repeat ordered)  · Left ventricle ejection fraction is severely decreased at 25%  · Grade III (severe) left ventricular diastolic dysfunction consistent with restrictive physiology.  · LA pressure is elevated.  · Right ventricular cavity size is moderately dilated.  · Left atrium is severely dilated.  · The estimated PA systolic pressure is 59.85 mm Hg  · Pulmonary hypertension present.    Stress Test: 10/17/18  · Abnormal myocardial perfusion study  · There is a moderate amount of infarct in the inferior wall(s).In the typical distribution of the RCA territory.  · Post Stress Ejection Fraction is 17 %    Cath: 5/6/16  B. Summary/Post-Operative Diagnosis    Three vessel coronary artery disease.    LV systolic and diastolic dysfunction.  D. Hemodynamic Results  LVEDP (Pre): 25 mmHg  LVEDP (Post): 30 mmHg  Ejection Fraction: 35%  E. Angiographic Results       Patient has a right dominant coronary artery.      - Left Main Coronary Artery:             The LM has luminal irregularities. There is BAKARI 3 flow.     - Left Anterior Descending Artery:             The proximal LAD has a 80% stenosis. There is BAKARI 3 flow.             The mid LAD has a 90% stenosis. There is BAKARI 3 flow. The remaining portion of the vessel is diffusely diseased.     - Left Circumflex Artery:             The mid LCX has a 60% stenosis. There is BAKARI 3 flow. The remaining  portion of the vessel has luminal irregularities.     - Right Coronary Artery:             The proximal RCA has a 60% stenosis. There is BAKARI 3 flow.             The mid RCA has a 80% stenosis. There is BAKARI 3 flow. The remaining portion of the vessel has luminal irregularities.     - Posterior Descending Artery:             The proximal PDA has a 60% stenosis. There is BAKARI 3 flow. The remaining portion of the vessel has luminal irregularities.      Assessment and Plan:     * Cardiac arrest    Unclear precipitant  Not on tele when this occurred, but has ICD in place  [employer] called to interrogate  No immediate premorbid anginal sxs  Currently in SR     CAD (coronary artery disease)    Known MV CAD unamenable to CABG or PCI (per notes from 2016)  Severe LV dysfxn noted  [employer] ICD in place     Chronic combined systolic and diastolic heart failure    As above     Essential hypertension    Cont med rx as BP will tolerate     HLD (hyperlipidemia)    Cont statin     DM type 2 with diabetic peripheral neuropathy    Per IM     Foot infection    Per IM/ID         VTE Risk Mitigation (From admission, onward)        Ordered     enoxaparin injection 110 mg  Every 12 hours (non-standard times)      12/11/18 1531     IP VTE HIGH RISK PATIENT  Once      12/10/18 2126        Critical care time 40min    Thank you for your consult. I will follow-up with patient. Please contact us if you have any additional questions.    Ismael Romano MD  Cardiology   [facility]    Addendum 620pm:  [employer] PPM interrogated, no tachy events.  Note made of episodic pacing at 40BPM, but unclear exact timing.  This does not appear to be an arrhythmic syncopal event/cardiac arrest based on these findings.    Echo 12/11/18 (images pers rev)  · Severely decreased left ventricular systolic function. The estimated ejection fraction is 20%  · Severe global hypokinetic wall motion. Cannot exclude RWMA.  · Septal wall has abnormal  motion. Systolic and diastolic flattening of the interventricular septum consistent with right ventricle pressure and volume overload.  · Left ventricular diastolic dysfunction.  · Mild right ventricular enlargement.  · Mildly to moderately reduced right ventricular systolic function.  · Moderate tricuspid regurgitation.  · There is a large left pleural effusion.

## 2018-12-11 NOTE — PROGRESS NOTES
[facility] Hospital Medicine  Progress Note    Patient Name: Marvin Ray  MRN: [id number]  Patient Class: IP- Inpatient   Admission Date: 12/10/2018  Length of Stay: 0 days  Attending Physician: Jazz Sotomayor MD  Primary Care Provider: Rubén Davis MD        Subjective:     Principal Problem:Cardiac arrest    HPI:  58 y.o. male with DM2, HLD, HTN, CAD, chronic combined heart failure, CKD stage III presents with a complaint of left foot wound infection.  Prior cultures show only sensitive to meropenem.  Outpatient IV antibiotics were attempted to be setup last night and throughout the day today through his PCP but were unable to be procured.  He denies fever, chills, cough, SOB, chest pain, palpitations, headaches, vision changes, N/V/D, abdominal pain, or dysuria.  Placed in observation for PICC line placement and to setup home IV abx.    Hospital Course:  Patient admitted for PICC line placement for IV antibiotics for left wound infection -12/5/18 wound culture Acinetobacter baumannii/haemolyticus sensitive to meropenem. IV meropenum started in ED. On 12/11/18, patient cardiac arrest with ROSC after ACLS. Patient intubated and transferred to ICU. Narcan given prior to transfer to ICU.       Interval History: See above.     Review of Systems   Constitutional: Negative for chills and fever.   Eyes: Negative for photophobia and visual disturbance.   Respiratory: Negative for cough and shortness of breath.    Cardiovascular: Negative for chest pain, palpitations and leg swelling.   Gastrointestinal: Negative for abdominal pain, diarrhea, nausea and vomiting.   Genitourinary: Negative for frequency, hematuria and urgency.   Skin: Positive for wound. Negative for pallor and rash.   Neurological: Negative for light-headedness and headaches.   Psychiatric/Behavioral: Negative for confusion and decreased concentration.     Objective:     Vital Signs (Most Recent):  Temp: 98 °F (36.7 °C)  (12/11/18 1131)  Pulse: 98 (12/11/18 1131)  Resp: 18 (12/11/18 1131)  BP: (!) 92/54 (12/11/18 1131)  SpO2: (!) 91 % (12/11/18 1131) Vital Signs (24h Range):  Temp:  [97.7 °F (36.5 °C)-98.9 °F (37.2 °C)] 98 °F (36.7 °C)  Pulse:  [] 98  Resp:  [18] 18  SpO2:  [91 %-97 %] 91 %  BP: ()/(54-71) 92/54     Weight: 110.2 kg (242 lb 15.2 oz)  Body mass index is 34.86 kg/m².    Intake/Output Summary (Last 24 hours) at 12/11/2018 1341  Last data filed at 12/11/2018 0600  Gross per 24 hour   Intake 940 ml   Output 700 ml   Net 240 ml      Physical Exam   Constitutional: He appears well-developed.   Ill appearing.   Neck: Normal range of motion.   Cardiovascular: Normal rate and regular rhythm.   Prior to code, HR normal , was not on monitor   Pulmonary/Chest:   Prior to intubation, lungs clear    Abdominal: Soft. Bowel sounds are normal. He exhibits distension. There is no tenderness.   Musculoskeletal: Normal range of motion. He exhibits edema (BLE).   Neurological:   Prior to code, patient AAO x 4   Skin: Skin is warm and dry. No rash noted.   Right foot guaze dressing.   Left toes 1-5 amputation with large surface foot wound with tissue graulation (per family much improved).        Significant Labs: All pertinent labs within the past 24 hours have been reviewed.    Significant Imaging: I have reviewed and interpreted all pertinent imaging results/findings within the past 24 hours.    Assessment/Plan:      * Cardiac arrest    Denies chest pain or shortness of breath. ROSC after ACLS. Intubated and transferred to ICU. Plan for ICD interrogation, Cardiology consult.        Foot infection    Tissue culture obtained by Vascular Surgery obtained 12/5/18 grew Acinetobacter baumannii/haemolyticus sensitive to meropenem.  Continue IV meropenem. Appreciate ID consult.      Chronic combined systolic and diastolic heart failure    No evidence of acute decompensation or fluid overload, continue home med regimen, I/O's, daily  weights. EF 25% with AICD 11/2018. Lasix held this am due to worsen renal function and history of poor oral intake prior to admission. Continue ACEI, BB. Hold lisinopril and lasix for now.      LAYNE (acute kidney injury)    Patient with previous LAYNE . Baseline 0.8-0.9. sCR 1.2 this am. Likely prerenal given history. Hold lisinopril and lasix. Avoid nephrotoxins. Repeat labs as now likely worsen after code.      CAD (coronary artery disease)    See above.      Essential hypertension    Well controlled, continue home medications and monitor blood pressure, adjust as needed.Hold lisinopril and lasix due to renal function.      HLD (hyperlipidemia)    Continue home regimen of ezetimibe and fish oil     DM type 2 with diabetic peripheral neuropathy    Last HgbA1c   Lab Results   Component Value Date    HGBA1C 8.3 (H) 10/30/2018   Hold oral antihyperglycemics while inpatient  PRN sliding scale insulin           VTE Risk Mitigation (From admission, onward)        Ordered     enoxaparin injection 40 mg  Daily      12/10/18 2126     IP VTE HIGH RISK PATIENT  Once      12/10/18 2126          Critical care time spent on the evaluation and treatment of severe organ dysfunction, review of pertinent labs and imaging studies, discussions with consulting providers and discussions with patient/family: 45 minutes.    Yesy Madison NP  Department of Hospital Medicine   [facility]

## 2018-12-11 NOTE — ASSESSMENT & PLAN NOTE
Denies chest pain or shortness of breath. ROSC after ACLS. Intubated and transferred to ICU. Plan for ICD interrogation, Cardiology consult.

## 2018-12-11 NOTE — ASSESSMENT & PLAN NOTE
Stable, at baseline, maintain euvolemic state, strict I/O's, monitor renal function and electrolytes, avoid nephrotoxic agents.

## 2018-12-11 NOTE — PLAN OF CARE
Brief id note:     Received call from NP to discuss patient with DM foot infection that may be discharged prior to full ID consult to be done tomorrow. He is a patient with uncontrolled DM and vascular disease admitted with L foot wound necrosiss including underlying bone. On 12/5, vascular surgery did:  1.  Left foot wound debridement, 25 x 18 x 1 cm.  2.  Left foot wound washout, 25 x 18 x 1 cm.  3.  Left great toe amputation including metatarsal head.    Reviewed cultures (below). Seems reasonable to treat the acinetobacter since this continues to grow from bone culture, but this does not have enterococcus coverage. I don't have a good understanding of how (or if) the e.faecalis that grew on 11/16 was treated.     Pt is overweight with normal renal function, so meropenem dose is 2gm IV q8h. Trend weekly cmp, esr, crp and have labs faxed to [phone]. I would prefer to evaluate the patient in hospital tomorrow prior to discharge, but if he will be discharged before that, would like to see him in ID clinic ASAP. Will ask clinic to facilitate f/u.     Reviewed cultures:     Left metatarsal bone/tissue   Susceptibility      Acinetobacter baumannii/haemolyticus     CULTURE, TISSUE     Amikacin <=16  Sensitive     Amp/Sulbactam 16/8  Intermediate     Cefepime 16  Intermediate     Ceftriaxone 32  Intermediate     Ciprofloxacin >2  Resistant     Gentamicin 8  Intermediate     Meropenem <=4  Sensitive     Tetracycline 8  Intermediate     Tobramycin <=4  Sensitive     Trimeth/Sulfa >2/38  Resistant                Collected: 11/16/18 0838       Order Status: Completed Specimen: Bone from Foot, Left Updated: 11/19/18 0825    Aerobic Bacterial Culture --    ACINETOBACTER BAUMANNII/HAEMOLYTICUS   Moderate     Aerobic Bacterial Culture --    ENTEROCOCCUS FAECALIS   Few     Aerobic Bacterial Culture --    STREPTOCOCCUS GROUP G   Rare   Beta-hemolytic streptococci are routinely susceptible to   penicillins,cephalosporins and  carbapenems.    Narrative:     2nd - 5th toe left foot   Susceptibility      Acinetobacter baumannii/haemolyticus Enterococcus faecalis     CULTURE, AEROBIC  (SPECIFY SOURCE) CULTURE, AEROBIC  (SPECIFY SOURCE)     Amikacin <=16  Sensitive       Amp/Sulbactam >16/8  Resistant       Ampicillin   <=2  Sensitive     Cefepime <=8  Sensitive       Ceftriaxone 32  Intermediate       Ciprofloxacin <=1  Sensitive       Gentamicin <=4  Sensitive       Gentamicin Synergy Screen   <=500  Sensitive     Meropenem <=4  Sensitive       Tetracycline <=4  Sensitive <=4  Sensitive     Tobramycin <=4  Sensitive       Trimeth/Sulfa >2/38  Resistant       Vancomycin   2  Sensitive            Aerobic culture [[id number]]  Collected: 11/16/18 0848   Order Status: Completed Specimen: Bone from Foot, Left Updated: 11/19/18 0823    Aerobic Bacterial Culture --    ACINETOBACTER BAUMANNII/HAEMOLYTICUS   Moderate     Aerobic Bacterial Culture --    STREPTOCOCCUS GROUP G   Few   Beta-hemolytic streptococci are routinely susceptible to   penicillins,cephalosporins and carbapenems.    Narrative:     2nd-5th toe   Susceptibility      Acinetobacter baumannii/haemolyticus     CULTURE, AEROBIC  (SPECIFY SOURCE)     Amikacin <=16  Sensitive     Amp/Sulbactam <=8/4  Sensitive     Cefepime <=8  Sensitive     Ceftriaxone <=8  Sensitive     Ciprofloxacin <=1  Sensitive     Gentamicin <=4  Sensitive     Meropenem <=4  Sensitive     Tetracycline <=4  Sensitive     Tobramycin <=4  Sensitive     Trimeth/Sulfa <=2/38  Sensitive

## 2018-12-11 NOTE — PROGRESS NOTES
Patients family called Friday to stating Dr. Chan had deferred treatment of positive cultures to this MD.  Final tissue cultures showed no PO abx would help in treatment.  Placed orders this AM for PICC line placement and for [facility] to start Meropenem 1g every 8 hours.  Unfortunately, PICC line can't be placed until Wednesday.  Patient instructed to return to ED as I cannot get PICC placed for 2 more days.        Rubén Davis MD

## 2018-12-11 NOTE — ASSESSMENT & PLAN NOTE
No evidence of acute decompensation or fluid overload, continue home med regimen, I/O's, daily weights.

## 2018-12-11 NOTE — PLAN OF CARE
To patient's room to discuss patient managing his care at home.      TN Role Explained.  Patient identified by using 2 identifiers:  Name and date of birth    Patient stated that his sister, Chloé WILL HELP AT HOME WITH his RECOVERY.      TN name and contact info placed on the communication board    Preferred Pharmacy:  [facility] [address] [place], [place] [address] [facility] [address] [place] [address]  Phone: [phone] Fax: [phone]       12/11/18 134   Discharge Assessment   Assessment Type Discharge Planning Assessment   Confirmed/corrected address and phone number on facesheet? Yes   Assessment information obtained from? Patient   Expected Length of Stay (days) 2   Communicated expected length of stay with patient/caregiver yes   Prior to hospitilization cognitive status: Alert/Oriented   Prior to hospitalization functional status: Assistive Equipment;Needs Assistance   Current cognitive status: Alert/Oriented   Current Functional Status: Assistive Equipment;Needs Assistance   Lives With sibling(s)   Able to Return to Prior Arrangements yes   Is patient able to care for self after discharge? No   Who are your caregiver(s) and their phone number(s)? SisterChloé [phone]   Patient's perception of discharge disposition home health   Readmission Within the Last 30 Days previous discharge plan unsuccessful   If yes, most recent facility name: [facility]   Patient currently being followed by outpatient case management? No   Patient currently receives any other outside agency services? No   Equipment Currently Used at Home wheelchair;crutches;walker, standard;rollator;cane, straight   Do you have any problems affording any of your prescribed medications? No   Is the patient taking medications as prescribed? yes   Does the patient have transportation home? Yes   Transportation Anticipated family or friend will provide   Does the patient receive services at the  [facility]? No   Discharge Plan A Home with family;Home Health   Discharge Plan B Home with family   Patient/Family in Agreement with Plan yes   Readmission Questionnaire   At the time of your discharge, did someone talk to you about what your health problems were? No   At the time of discharge, did someone talk to you about what to watch out for regarding worsening of your health problem? No   At the time of discharge, did someone talk to you about what to do if you experienced worsening of your health problem? No   At the time of discharge, did someone talk to you about which medication to take when you left the hospital and which ones to stop taking? No   At the time of discharge, did someone talk to you about when and where to follow up with a doctor after you left the hospital? No   How often do you need to have someone help you when you read instructions, pamphlets, or other written material from your doctor or pharmacy? Sometimes   Do you have problems taking your medications as prescribed? No   Do you have any problems affording any of  your prescribed medications? No   Do you have problems obtaining/receiving your medications? No   Does the patient have transportation to healthcare appointments? Yes   Living Arrangements house   Does the patient have family/friends to help with healtcare needs after discharge? yes   Does your caregiver provide all the help you need? Yes   Are you currently feeling confused? No   Are you currently having problems thinking? No   Are you currently having memory problems? No   In the last 7 days, my sleep quality was: fair

## 2018-12-11 NOTE — TELEPHONE ENCOUNTER
"----- Message from Veena Herr sent at 12/10/2018  3:14 PM CST -----  Contact: yonis "sister" [phone]  Would like to discuss changing the location for pt to get his PICC line put in   "

## 2018-12-11 NOTE — ASSESSMENT & PLAN NOTE
Known MV CAD unamenable to CABG or PCI (per notes from 2016)  Severe LV dysfxn noted  [facility] ICD in place

## 2018-12-11 NOTE — H&P
[facility] Medicine  History & Physical    Patient Name: Marvin Ray  MRN: [id number]  Admission Date: 12/10/2018  Attending Physician: Roberto Black MD   Primary Care Provider: Rubén Davis MD         Patient information was obtained from patient, spouse/SO, past medical records and ER records.     Subjective:     Principal Problem:Foot infection    Chief Complaint:   Chief Complaint   Patient presents with    IV Medication     Pt wound cx resistant to PO abx. He was sent in last night for IV med. Pt unable to picc line until Wed. Pt needs another dose of IV antibiotic.        HPI: 58 y.o. male with DM2, HLD, HTN, CAD, chronic combined heart failure, CKD stage III presents with a complaint of left foot wound infection.  Prior cultures show only sensitive to meropenem.  Outpatient IV antibiotics were attempted to be setup last night and throughout the day today through his PCP but were unable to be procured.  He denies fever, chills, cough, SOB, chest pain, palpitations, headaches, vision changes, N/V/D, abdominal pain, or dysuria.  Placed in observation for PICC line placement and to setup home IV abx.    Past Medical History:   Diagnosis Date    Arthritis     Cellulitis     CKD (chronic kidney disease), stage III     Coronary artery disease     Diabetes mellitus     Diabetic retinopathy     Diabetic ulcer of left foot     Glaucoma     Gout     Hyperlipemia     Hypertension     ICD (implantable cardioverter-defibrillator) in place 11/02/2018    Left chest    Non-pressure chronic ulcer of other part of left foot with fat layer exposed 10/23/2018    PVD (peripheral vascular disease)     Type 2 diabetes mellitus with left diabetic foot ulcer 10/29/2018    Unsteady gait     uses a wheelchair       Past Surgical History:   Procedure Laterality Date    AHMED GLAUCOMA IMPLANT Left 2011    DONE AT [facility]    AMPUTATION, TOE Left 12/5/2018    Performed by  Mitch Chan MD at [facility] OR    AMPUTATION, TOES  2-5 Left 11/16/2018    Performed by Mitch Chan MD at [facility] OR    BAERVELDT GLAUCOMA IMPLANT Left 2012    WITH CATARACT EXTRACTION//DONE AT [facility]    CARDIAC CATHETERIZATION Left 05/2016    CARDIAC DEFIBRILLATOR PLACEMENT Left 11/02/2018    CATARACT EXTRACTION W/  INTRAOCULAR LENS IMPLANT Left 2012    WITH BAERVEDT//DONE AT [facility]    CATARACT EXTRACTION W/  INTRAOCULAR LENS IMPLANT Right 09/26/2018    COMPLEX ()    CB DESTRUCTION WITH CYCLO G6 Left 02/15/2017        CYST REMOVAL      Exam under anesthesia, left foot debridement, washout and all other indicated procedures Left 12/5/2018    Performed by Mitch Chan MD at [facility] OR    EXAMINATION UNDER ANESTHESIA Left 12/5/2018    Procedure: Exam under anesthesia, left foot debridement, washout and all other indicated procedures;  Surgeon: Mitch Chan MD;  Location: [facility] OR;  Service: Vascular;  Laterality: Left;  1030AM START  RN PREOP 12/3/2018-----AICD  SEEN BY DR GREER 12/4    HEART CATH-LEFT N/A 5/6/2016    [facility] C[facility] LAB    INCISION AND DRAINAGE Left 11/14/2018    Procedure: Incision and Drainage, left lower extremity debridement, washout;  Surgeon: Mitch Chan MD;  Location: [facility] OR;  Service: Vascular;  Laterality: Left;    INCISION AND DRAINAGE Left 11/16/2018    Procedure: INCISION AND DRAINAGE;  Surgeon: Mitch Chan MD;  Location: [facility] OR;  Service: Vascular;  Laterality: Left;    INCISION AND DRAINAGE Left 11/16/2018    Performed by Mitch Chan MD at [facility] OR    Incision and Drainage, left lower extremity debridement, washout Left 11/14/2018    Performed by Mitch Chan MD at [facility] OR    INSERTION, ICD GENERATOR, SINGLE CHAMBER N/A 11/2/2018    Performed by Pernell Greer MD at [facility] CATH LAB    INSERTION, IOL PROSTHESIS Right 9/26/2018    [facility] by Perla  [facility] Milana at [facility] OR [address] [place] [address]    INTRAOCULAR PROSTHESES INSERTION Right 9/26/2018    Procedure: INSERTION, IOL PROSTHESIS;  Surgeon: Perla Cortés MD;  Location: [facility] OR [address] [place] [address];  Service: Ophthalmology;  Laterality: Right;    PHACOEMULSIFICATION OF CATARACT Right 9/26/2018    Procedure: PHACOEMULSIFICATION, CATARACT;  Surgeon: Perla Cortés MD;  Location: [facility] OR [address] [place] [address];  Service: Ophthalmology;  Laterality: Right;    PHACOEMULSIFICATION, CATARACT Right 9/26/2018    Performed by Perla Cortés MD at [facility] OR [address] [place] [address]    Right foot surgery  10/2014    TOE AMPUTATION Right     first and second    TOE AMPUTATION Left 11/16/2018    Procedure: AMPUTATION, TOES  2-5;  Surgeon: Mitch Chan MD;  Location: [facility];  Service: Vascular;  Laterality: Left;    TOE AMPUTATION Left 12/5/2018    Procedure: AMPUTATION, TOE;  Surgeon: Mitch Chan MD;  Location: [facility] OR;  Service: Vascular;  Laterality: Left;  left great toe    TONSILLECTOMY      TRABECULECTOMY/G 6 LASER Left 2/15/2017    Performed by Perla Cortés MD at [facility] OR [address] [place] [address]       Review of patient's allergies indicates:   Allergen Reactions    Statins-hmg-coa reductase inhibitors      Generalized Pain    Onglyza [saxagliptin]     Penicillins Rash       Current Facility-Administered Medications on File Prior to Encounter   Medication    [COMPLETED] meropenem-0.9% sodium chloride 1 g/50 mL IVPB     Current Outpatient Medications on File Prior to Encounter   Medication Sig    allopurinol (ZYLOPRIM) 100 MG tablet Take 2 tablets (200 mg total) by mouth once daily.    amLODIPine (NORVASC) 5 MG tablet TAKE 1 TABLET(5 MG) BY MOUTH EVERY DAY    aspirin 81 MG Chew Take 81 mg by mouth once daily.    benazepril (LOTENSIN) 40 MG tablet TAKE 1 TABLET(40 MG) BY MOUTH TWICE DAILY    brimonidine 0.1% (ALPHAGAN P) 0.1 % Drop Place 1 drop into both eyes 3 (three) times daily.    carvedilol (COREG) 6.25 MG tablet Take 1  "tablet (6.25 mg total) by mouth 2 (two) times daily.    coenzyme Q10 100 mg capsule Take 100 mg by mouth every morning.    collagenase (SANTYL) ointment Apply topically once daily.    dorzolamide-timolol 2-0.5% (COSOPT) 22.3-6.8 mg/mL ophthalmic solution Place 1 drop into both eyes 3 (three) times daily.    ezetimibe (ZETIA) 10 mg tablet Take 1 tablet (10 mg total) by mouth once daily.    fish oil-omega-3 fatty acids 300-1,000 mg capsule Take 2 capsules (2 g total) by mouth 2 (two) times daily. (Patient taking differently: Take 1 g by mouth 2 (two) times daily. )    furosemide (LASIX) 40 MG tablet Take 1 tablet (40 mg total) by mouth 2 (two) times daily.    insulin glargine-lixisenatide (SOLIQUA 100/33) 100 unit-33 mcg/mL InPn Inject 34 units once daily    ketorolac 0.5% (ACULAR) 0.5 % Drop Place 1 drop into the right eye 3 (three) times daily.    MULTIVIT,THER IRON,CA,FA & MIN (MULTIVITAMIN) Tab Take 1 tablet by mouth every morning.     oxyCODONE-acetaminophen (PERCOCET) 5-325 mg per tablet Take 1 tablet by mouth every 6 (six) hours as needed for Pain.    pen needle, diabetic 31 gauge x 1/4" Ndle Use as directed     Family History     Problem Relation (Age of Onset)    Diabetes Mother    Heart disease Brother    No Known Problems Father, Sister, Maternal Aunt, Maternal Uncle, Paternal Aunt, Paternal Uncle, Maternal Grandmother, Maternal Grandfather, Paternal Grandmother, Paternal Grandfather        Tobacco Use    Smoking status: Former Smoker     Packs/day: 1.00     Years: 3.00     Pack years: 3.00     Types: Cigarettes     Last attempt to quit: 1984     Years since quittin.4    Smokeless tobacco: Never Used   Substance and Sexual Activity    Alcohol use: Yes     Alcohol/week: 0.6 oz     Types: 1 Cans of beer per week     Comment: Occasional    Drug use: No    Sexual activity: Not Currently     Review of Systems   Constitutional: Negative for chills and fever.   Eyes: Negative for " photophobia and visual disturbance.   Respiratory: Negative for cough and shortness of breath.    Cardiovascular: Negative for chest pain, palpitations and leg swelling.   Gastrointestinal: Negative for abdominal pain, diarrhea, nausea and vomiting.   Genitourinary: Negative for frequency, hematuria and urgency.   Skin: Positive for wound. Negative for pallor and rash.   Neurological: Negative for light-headedness and headaches.   Psychiatric/Behavioral: Negative for confusion and decreased concentration.     Objective:     Vital Signs (Most Recent):  Temp: 97.7 °F (36.5 °C) (12/10/18 1937)  Pulse: 101 (12/10/18 1937)  Resp: 18 (12/10/18 1937)  BP: 118/61 (12/10/18 1937)  SpO2: (!) 94 % (12/10/18 1937) Vital Signs (24h Range):  Temp:  [97.6 °F (36.4 °C)-98.3 °F (36.8 °C)] 97.7 °F (36.5 °C)  Pulse:  [] 101  Resp:  [18] 18  SpO2:  [92 %-97 %] 94 %  BP: (116-123)/(61-71) 118/61     Weight: 110.2 kg (242 lb 15.2 oz)  Body mass index is 34.86 kg/m².    Physical Exam   Constitutional: He is oriented to person, place, and time. He appears well-developed and well-nourished. No distress.   HENT:   Head: Normocephalic and atraumatic.   Right Ear: External ear normal.   Left Ear: External ear normal.   Nose: Nose normal.   Mouth/Throat: Oropharynx is clear and moist.   Eyes: Conjunctivae and EOM are normal. Pupils are equal, round, and reactive to light.   Neck: Normal range of motion. Neck supple.   Cardiovascular: Normal rate, regular rhythm and intact distal pulses.   Pulmonary/Chest: Effort normal and breath sounds normal. No respiratory distress. He has no wheezes. He has no rales.   Abdominal: Soft. Bowel sounds are normal. He exhibits no distension. There is no tenderness.   No palpable hepatomegaly or splenomegaly   Musculoskeletal: Normal range of motion. He exhibits no edema or tenderness.   Dressings to bilat lower ext c/d/i; recent left great toe amputation   Neurological: He is alert and oriented to person,  place, and time.   Skin: Skin is warm and dry. There is pallor.   Psychiatric: He has a normal mood and affect. Thought content normal.   Nursing note and vitals reviewed.        CRANIAL NERVES     CN III, IV, VI   Pupils are equal, round, and reactive to light.  Extraocular motions are normal.        Significant Labs: All pertinent labs within the past 24 hours have been reviewed.    Significant Imaging: I have reviewed all pertinent imaging results/findings within the past 24 hours.    Assessment/Plan:     * Foot infection    Tissue culture obtained by Vascular Surgery obtained 12/5/18 grew Acinetobacter baumannii/haemolyticus sensitive to meropenem.  continue IV meropenem.  Place PICC and setup outpatient abx.     Chronic combined systolic and diastolic heart failure    No evidence of acute decompensation or fluid overload, continue home med regimen, I/O's, daily weights.     Stage 3 chronic kidney disease    Stable, at baseline, maintain euvolemic state, strict I/O's, monitor renal function and electrolytes, avoid nephrotoxic agents.     CAD (coronary artery disease)    No acute issue, continue home regimen     Essential hypertension    Well controlled, continue home medications and monitor blood pressure, adjust as needed.     HLD (hyperlipidemia)    Continue home regimen of ezetimibe and fish oil     DM type 2 with diabetic peripheral neuropathy    Last HgbA1c   Lab Results   Component Value Date    HGBA1C 8.3 (H) 10/30/2018     Hold oral antihyperglycemics while inpatient  PRN sliding scale insulin  ACHS glucose monitoring   ADA diet       VTE Risk Mitigation (From admission, onward)        Ordered     enoxaparin injection 40 mg  Daily      12/10/18 2126     IP VTE HIGH RISK PATIENT  Once      12/10/18 2126     Place sequential compression device  Until discontinued      12/10/18 1928        Wilbert Pate Jr., APRN, AGACNP-BC  Hospitalist - Department of Hospital Medicine  [facility] -  [address] [place] [address]  Office #: [phone]; Pager #: [phone]

## 2018-12-11 NOTE — SUBJECTIVE & OBJECTIVE
Interval History: See above.     Review of Systems   Constitutional: Negative for chills and fever.   Eyes: Negative for photophobia and visual disturbance.   Respiratory: Negative for cough and shortness of breath.    Cardiovascular: Negative for chest pain, palpitations and leg swelling.   Gastrointestinal: Negative for abdominal pain, diarrhea, nausea and vomiting.   Genitourinary: Negative for frequency, hematuria and urgency.   Skin: Positive for wound. Negative for pallor and rash.   Neurological: Negative for light-headedness and headaches.   Psychiatric/Behavioral: Negative for confusion and decreased concentration.     Objective:     Vital Signs (Most Recent):  Temp: 98 °F (36.7 °C) (12/11/18 1131)  Pulse: 98 (12/11/18 1131)  Resp: 18 (12/11/18 1131)  BP: (!) 92/54 (12/11/18 1131)  SpO2: (!) 91 % (12/11/18 1131) Vital Signs (24h Range):  Temp:  [97.7 °F (36.5 °C)-98.9 °F (37.2 °C)] 98 °F (36.7 °C)  Pulse:  [] 98  Resp:  [18] 18  SpO2:  [91 %-97 %] 91 %  BP: ()/(54-71) 92/54     Weight: 110.2 kg (242 lb 15.2 oz)  Body mass index is 34.86 kg/m².    Intake/Output Summary (Last 24 hours) at 12/11/2018 1341  Last data filed at 12/11/2018 0600  Gross per 24 hour   Intake 940 ml   Output 700 ml   Net 240 ml      Physical Exam   Constitutional: He appears well-developed.   Ill appearing.   Neck: Normal range of motion.   Cardiovascular: Normal rate and regular rhythm.   Prior to code, HR normal , was not on monitor   Pulmonary/Chest:   Prior to intubation, lungs clear    Abdominal: Soft. Bowel sounds are normal. He exhibits distension. There is no tenderness.   Musculoskeletal: Normal range of motion. He exhibits edema (BLE).   Neurological:   Prior to code, patient AAO x 4   Skin: Skin is warm and dry. No rash noted.   Right foot guaze dressing.   Left toes 1-5 amputation with large surface foot wound with tissue graulation (per family much improved).        Significant Labs: All pertinent labs within  the past 24 hours have been reviewed.    Significant Imaging: I have reviewed and interpreted all pertinent imaging results/findings within the past 24 hours.

## 2018-12-11 NOTE — PLAN OF CARE
12/11/18 1350   Post-Acute Status   Post-Acute Authorization Home Health/Hospice   Patient transferred to ICU S/P code blue.  DC planning ongoing.  TN/SW to follow and assist as needed.

## 2018-12-11 NOTE — PROCEDURES
"Marvin Ray is a 58 y.o. male patient.    Temp: 97.7 °F (36.5 °C) (12/10/18 1937)  Pulse: 101 (12/10/18 1937)  Resp: 18 (12/10/18 1937)  BP: 118/61 (12/10/18 1937)  SpO2: (!) 94 % (12/10/18 1937)  Weight: 110.2 kg (242 lb 15.2 oz) (12/10/18 1937)  Height: 5' 10" (177.8 cm) (12/10/18 1937)    PICC  Date/Time: 12/10/2018 10:41 PM  Performed by: Gus Feliz RN  Consent Done: Yes  Time out: Immediately prior to procedure a time out was called to verify the correct patient, procedure, equipment, support staff and site/side marked as required  Indications: med administration and vascular access  Anesthesia: local infiltration  Local anesthetic: lidocaine 1% without epinephrine  Anesthetic Total (mL): 2  Preparation: skin prepped with chlorhexidine (without alcohol)  Skin prep agent dried: skin prep agent completely dried prior to procedure  Sterile barriers: all five maximum sterile barriers used - cap, mask, sterile gown, sterile gloves, and large sterile sheet  Hand hygiene: hand hygiene performed prior to central venous catheter insertion  Location details: right basilic  Catheter type: double lumen  Catheter size: 5 Fr  Catheter Length: 34cm    Ultrasound guidance: yes  Vessel Caliber: medium, compressibility normal  Vascular Doppler: not done  Needle advanced into vessel with real time Ultrasound guidance.  Guidewire confirmed in vessel.  Sterile sheath used.  no esophageal manometryNumber of attempts: 1  Post-procedure: sterile dressing applied and blood return through all ports            uGs Feliz  12/10/2018  "

## 2018-12-11 NOTE — TELEPHONE ENCOUNTER
----- Message from Anum Mata sent at 12/10/2018  3:55 PM CST -----  Contact: Self   Patient is on  the way to the ED to  make sure you have placed the order for him. Please call at  [phone]

## 2018-12-11 NOTE — ASSESSMENT & PLAN NOTE
--Wean vent to PRVC 440, PEEP 10, FiO2 40%.  --ABG w/o significant hypercapnia or hypoxia.   --Wean ventilator as tolerated. May be able to extubate as soon as tomorrow depending on his clinical course overnight.

## 2018-12-11 NOTE — SUBJECTIVE & OBJECTIVE
Past Medical History:   Diagnosis Date    Arthritis     Cellulitis     CKD (chronic kidney disease), stage III     Coronary artery disease     Diabetes mellitus     Diabetic retinopathy     Diabetic ulcer of left foot     Glaucoma     Gout     Hyperlipemia     Hypertension     ICD (implantable cardioverter-defibrillator) in place 11/02/2018    Left chest    Non-pressure chronic ulcer of other part of left foot with fat layer exposed 10/23/2018    PVD (peripheral vascular disease)     Type 2 diabetes mellitus with left diabetic foot ulcer 10/29/2018    Unsteady gait     uses a wheelchair       Past Surgical History:   Procedure Laterality Date    AHMED GLAUCOMA IMPLANT Left 2011    DONE AT [facility]    AMPUTATION, TOE Left 12/5/2018    Performed by Mitch Chan MD at [facility] OR    AMPUTATION, TOES  2-5 Left 11/16/2018    Performed by Mitch Chan MD at [facility] OR    BAERVELDT GLAUCOMA IMPLANT Left 2012    WITH CATARACT EXTRACTION//DONE AT [facility]    CARDIAC CATHETERIZATION Left 05/2016    CARDIAC DEFIBRILLATOR PLACEMENT Left 11/02/2018    CATARACT EXTRACTION W/  INTRAOCULAR LENS IMPLANT Left 2012    WITH BAERVEDT//DONE AT [facility]    CATARACT EXTRACTION W/  INTRAOCULAR LENS IMPLANT Right 09/26/2018    COMPLEX ()    CB DESTRUCTION WITH CYCLO G6 Left 02/15/2017        CYST REMOVAL      Exam under anesthesia, left foot debridement, washout and all other indicated procedures Left 12/5/2018    Performed by Mitch Chan MD at [facility] OR    EXAMINATION UNDER ANESTHESIA Left 12/5/2018    Procedure: Exam under anesthesia, left foot debridement, washout and all other indicated procedures;  Surgeon: Mitch Chan MD;  Location: [facility] OR;  Service: Vascular;  Laterality: Left;  1030AM START  RN PREOP 12/3/2018-----AICD  SEEN BY DR JAMES 12/4    HEART CATH-LEFT N/A 5/6/2016    Performed by [facility]ana [facility] at [facility] CATH LAB    INCISION AND DRAINAGE Left  11/14/2018    Procedure: Incision and Drainage, left lower extremity debridement, washout;  Surgeon: Mitch Chan MD;  Location: [facility] OR;  Service: Vascular;  Laterality: Left;    INCISION AND DRAINAGE Left 11/16/2018    Procedure: INCISION AND DRAINAGE;  Surgeon: Mitch Chan MD;  Location: [facility] OR;  Service: Vascular;  Laterality: Left;    INCISION AND DRAINAGE Left 11/16/2018    Performed by Mitch Chan MD at [facility] OR    Incision and Drainage, left lower extremity debridement, washout Left 11/14/2018    Performed by Mitch Chan MD at [facility] OR    INSERTION, ICD GENERATOR, SINGLE CHAMBER N/A 11/2/2018    Performed by Pernell Greer MD at [facility] CATH LAB    INSERTION, IOL PROSTHESIS Right 9/26/2018    Performed by Perla Cortés MD at [facility] OR [address] [place] [address]    INTRAOCULAR PROSTHESES INSERTION Right 9/26/2018    Procedure: INSERTION, IOL PROSTHESIS;  Surgeon: Perla Cortés MD;  Location: [facility] OR [address] [place] [address];  Service: Ophthalmology;  Laterality: Right;    PHACOEMULSIFICATION OF CATARACT Right 9/26/2018    Procedure: PHACOEMULSIFICATION, CATARACT;  Surgeon: Perla Cortés MD;  Location: [facility] OR [address] [place] [address];  Service: Ophthalmology;  Laterality: Right;    PHACOEMULSIFICATION, CATARACT Right 9/26/2018    Performed by Perla Cortés MD at [facility] OR [address] [place] [address]    Right foot surgery  10/2014    TOE AMPUTATION Right     first and second    TOE AMPUTATION Left 11/16/2018    Procedure: AMPUTATION, TOES  2-5;  Surgeon: Mitch Chan MD;  Location: [facility] OR;  Service: Vascular;  Laterality: Left;    TOE AMPUTATION Left 12/5/2018    Procedure: AMPUTATION, TOE;  Surgeon: Mitch Chan MD;  Location: [facility] OR;  Service: Vascular;  Laterality: Left;  left great toe    TONSILLECTOMY      TRABECULECTOMY/G 6 LASER Left 2/15/2017    Performed by Perla Cortés MD at [facility] OR [address] [place] [address]       Review of patient's allergies indicates:   Allergen Reactions     "Statins-hmg-coa reductase inhibitors      Generalized Pain    Onglyza [saxagliptin]     Penicillins Rash       No current facility-administered medications on file prior to encounter.      Current Outpatient Medications on File Prior to Encounter   Medication Sig    allopurinol (ZYLOPRIM) 100 MG tablet Take 2 tablets (200 mg total) by mouth once daily.    amLODIPine (NORVASC) 5 MG tablet TAKE 1 TABLET(5 MG) BY MOUTH EVERY DAY    aspirin 81 MG Chew Take 81 mg by mouth once daily.    carvedilol (COREG) 6.25 MG tablet Take 1 tablet (6.25 mg total) by mouth 2 (two) times daily.    coenzyme Q10 100 mg capsule Take 100 mg by mouth every morning.    collagenase (SANTYL) ointment Apply topically once daily.    dorzolamide-timolol 2-0.5% (COSOPT) 22.3-6.8 mg/mL ophthalmic solution Place 1 drop into both eyes 3 (three) times daily.    ezetimibe (ZETIA) 10 mg tablet Take 1 tablet (10 mg total) by mouth once daily.    fish oil-omega-3 fatty acids 300-1,000 mg capsule Take 2 capsules (2 g total) by mouth 2 (two) times daily. (Patient taking differently: Take 1 g by mouth 2 (two) times daily. )    insulin glargine-lixisenatide (SOLIQUA 100/33) 100 unit-33 mcg/mL InPn Inject 34 units once daily    ketorolac 0.5% (ACULAR) 0.5 % Drop Place 1 drop into the right eye 3 (three) times daily.    MULTIVIT,THER IRON,CA,FA & MIN (MULTIVITAMIN) Tab Take 1 tablet by mouth every morning.     oxyCODONE-acetaminophen (PERCOCET) 5-325 mg per tablet Take 1 tablet by mouth every 6 (six) hours as needed for Pain.    pen needle, diabetic 31 gauge x 1/4" Ndle Use as directed    [DISCONTINUED] benazepril (LOTENSIN) 40 MG tablet TAKE 1 TABLET(40 MG) BY MOUTH TWICE DAILY    [DISCONTINUED] brimonidine 0.1% (ALPHAGAN P) 0.1 % Drop Place 1 drop into both eyes 3 (three) times daily.    [DISCONTINUED] furosemide (LASIX) 40 MG tablet Take 1 tablet (40 mg total) by mouth 2 (two) times daily.     Family History     Problem Relation (Age of " Onset)    Diabetes Mother    Heart disease Brother    No Known Problems Father, Sister, Maternal Aunt, Maternal Uncle, Paternal Aunt, Paternal Uncle, Maternal Grandmother, Maternal Grandfather, Paternal Grandmother, Paternal Grandfather        Tobacco Use    Smoking status: Former Smoker     Packs/day: 1.00     Years: 3.00     Pack years: 3.00     Types: Cigarettes     Last attempt to quit: 1984     Years since quittin.4    Smokeless tobacco: Never Used   Substance and Sexual Activity    Alcohol use: Yes     Alcohol/week: 0.6 oz     Types: 1 Cans of beer per week     Comment: Occasional    Drug use: No    Sexual activity: Not Currently     Review of Systems   Unable to perform ROS: intubated     Objective:     Vital Signs (Most Recent):  Temp: 98 °F (36.7 °C) (18 1131)  Pulse: 72 (18 1615)  Resp: (!) 24 (18 161)  BP: (!) 87/51 (18 1520)  SpO2: (!) 93 % (18 161) Vital Signs (24h Range):  Temp:  [97.7 °F (36.5 °C)-98.9 °F (37.2 °C)] 98 °F (36.7 °C)  Pulse:  [] 72  Resp:  [18-33] 24  SpO2:  [87 %-100 %] 93 %  BP: ()/(43-71) 87/51  Arterial Line BP: (122-128)/(52-56) 128/55     Weight: 109.8 kg (242 lb)  Body mass index is 34.72 kg/m².    SpO2: (!) 93 %  O2 Device (Oxygen Therapy): ventilator      Intake/Output Summary (Last 24 hours) at 2018 1657  Last data filed at 2018 1600  Gross per 24 hour   Intake 1010.61 ml   Output 700 ml   Net 310.61 ml       Lines/Drains/Airways     Peripherally Inserted Central Catheter Line                 PICC Double Lumen 12/10/18 2241 right basilic less than 1 day          Drain                 Open Drain 18 1353 Left Foot Other (see comments) Other (see comments) 27 days         NG/OG Tube 18 1350 orogastric 7.5 Fr. Center mouth less than 1 day         Urethral Catheter 18 1351 Double-lumen 16 Fr. less than 1 day          Airway                 Airway - Non-Surgical 18 0334 Other (Comment) 25  days         Airway - Non-Surgical 12/11/18 1320 Endotracheal Tube less than 1 day          Arterial Line                 Arterial Line 12/11/18 1530 Left Radial less than 1 day                Physical Exam   Constitutional: He appears well-developed and well-nourished.   HENT:   Head: Normocephalic and atraumatic.   Eyes: No scleral icterus.   Neck: No JVD present. No thyromegaly present.   Cardiovascular: Normal rate, regular rhythm, S1 normal and S2 normal. Exam reveals distant heart sounds.   Pulmonary/Chest: Effort normal and breath sounds normal. No respiratory distress.   intub and sedated   Abdominal: Soft. He exhibits no distension.   Musculoskeletal: He exhibits no edema.   Neurological:   UTO   Skin: Skin is warm and dry.   Psychiatric:   UTO       Current Medications:   allopurinol  200 mg Oral Daily    aspirin  81 mg Oral Daily    aspirin  325 mg Oral Once    brimonidine 0.1%  1 drop Both Eyes BID    chlorhexidine  15 mL Mouth/Throat BID    clopidogrel  600 mg Oral Once    [START ON 12/12/2018] clopidogrel  75 mg Oral Daily    collagenase   Topical (Top) Daily    enoxaparin  1 mg/kg Subcutaneous Q12H    ezetimibe  10 mg Oral Daily    famotidine (PF)  20 mg Intravenous BID    [START ON 12/12/2018] furosemide  40 mg Intravenous Daily    meropenem (MERREM) IVPB  2 g Intravenous Q8H    miconazole NITRATE 2 %   Topical (Top) BID    omega 3-dha-epa-fish oil  1 capsule Oral BID    prednisoLONE acetate  1 drop Right Eye Daily    propofol        sodium chloride 0.9%  10 mL Intravenous Q6H      norepinephrine bitartrate-D5W 0.1 mcg/kg/min (12/11/18 1600)    propofol 50.06 mcg/kg/min (12/11/18 1600)     acetaminophen, dextrose 50%, dextrose 50%, glucagon (human recombinant), glucose, glucose, insulin aspart U-100, ondansetron, oxyCODONE-acetaminophen, ramelteon, Flushing PICC Protocol **AND** sodium chloride 0.9% **AND** sodium chloride 0.9%    Laboratory:  CBC:  Recent Labs   Lab  12/03/18  1130 12/09/18  1905 12/10/18  1718 12/11/18  0604 12/11/18  1406   WHITE BLOOD CELL COUNT 7.22 7.64 7.18 8.68 8.50   HEMOGLOBIN 9.6 L 9.0 L 9.2 L 8.5 L 8.6 L   HEMATOCRIT 30.0 L 27.6 L 28.2 L 26.6 L 26.2 L   PLATELETS 289 299 328 327 297       CHEMISTRIES:  Recent Labs   Lab 05/06/16  0548 05/07/16  0441 05/08/16  0606  12/03/18  1129 12/09/18  1905 12/10/18  1718 12/11/18  0604 12/11/18  1406   GLUCOSE 247 H 123 H 107   < > 122 H 97 74 76 132 H   SODIUM 139 140 140   < > 138 139 139 139 137   POTASSIUM 5.0 4.6 4.5   < > 4.1 4.3 4.0 4.1 4.4   BUN BLD 23 H 19 13   < > 59 H 59 H 56 H 58 H 60 H   CREATININE 1.0 0.8 0.8   < > 1.1 1.0 1.1 1.2 1.4   EGFR IF  >60.0 >60.0 >60.0   < > >60 >60 >60 >60 >60   EGFR IF NON- >60.0 >60.0 >60.0   < > >60 >60 >60 >60 54 A   CALCIUM 9.4 9.0 9.1   < > 8.6 L 8.8 8.7 8.5 L 8.2 L   MAGNESIUM 1.8 1.8 1.6  --  1.8  --   --   --  1.8    < > = values in this interval not displayed.       CARDIAC BIOMARKERS:  Recent Labs   Lab 05/05/16  2351 05/06/16  0548 10/12/16  1227 12/11/18  1406   CPK  --   --  93  --    TROPONIN I <0.006 0.013  --  0.532 H       COAGS:  Recent Labs   Lab 10/14/18  1059 10/30/18  0955 11/08/18  1154 12/03/18  1130 12/10/18  1718   INR 1.2 1.1 1.1 1.2 1.1       LIPIDS/LFTS:  Recent Labs   Lab 01/08/16  0952  05/08/16  0606 10/12/16  1227 07/27/18  0820  10/30/18  0955  11/21/18  0501 12/03/18  1129 12/09/18  1905 12/11/18  0604 12/11/18  1406   CHOLESTEROL 201 H  --  168 174 151  --  107 L  --   --   --   --   --   --    TRIGLYCERIDES 136  --  97 80 83  --  44  --   --   --   --   --   --    HDL 33 L  --  28 L 35 L 29 L  --  27 L  --   --   --   --   --   --    LDL CHOLESTEROL 140.8  --  120.6 123.0 105.4  --  71.2  --   --   --   --   --   --    NON-HDL CHOLESTEROL 168  --  140 139 122  --  80  --   --   --   --   --   --    AST 30   < > 9 L 16 27   < >  --    < > 17 33 21 20 29   ALT 12   < > 9 L 15 28   < >  --    < > 20 28 18  16 21    < > = values in this interval not displayed.       BNP:  Recent Labs   Lab 05/05/16  2351 12/11/18  1406    H 2,503 H       TSH:        Free T4:        Diagnostic Results:  ECG (personally reviewed tracings):  12/11/18 1359 SR 78, PRWP, NSSTTW changes, similar to 11/9/18    Chest X-Ray (personally reviewed image(s)): 12/11/18  1. Interval development of an air density curvilinear airspace opacity subjacent to the right hemidiaphragm suggest interval development of pneumoperitoneum.  Surgical consult/evaluation is indicated.  2.  Interval placement of an endotracheal tube in good position.  Remaining lines and leads are stable.  No pneumothorax.  3.  Bilateral perihilar and bibasilar airspace opacities likely reflect atelectasis however, superimposed infection cannot be excluded.    LE venous US 12/11/18  No evidence of deep venous thrombosis in either lower extremity.    Echo: 10/16/18 (repeat ordered)  · Left ventricle ejection fraction is severely decreased at 25%  · Grade III (severe) left ventricular diastolic dysfunction consistent with restrictive physiology.  · LA pressure is elevated.  · Right ventricular cavity size is moderately dilated.  · Left atrium is severely dilated.  · The estimated PA systolic pressure is 59.85 mm Hg  · Pulmonary hypertension present.    Stress Test: 10/17/18  · Abnormal myocardial perfusion study  · There is a moderate amount of infarct in the inferior wall(s).In the typical distribution of the RCA territory.  · Post Stress Ejection Fraction is 17 %    Cath: 5/6/16  B. Summary/Post-Operative Diagnosis    Three vessel coronary artery disease.    LV systolic and diastolic dysfunction.  D. Hemodynamic Results  LVEDP (Pre): 25 mmHg  LVEDP (Post): 30 mmHg  Ejection Fraction: 35%  E. Angiographic Results       Patient has a right dominant coronary artery.      - Left Main Coronary Artery:             The LM has luminal irregularities. There is BAKARI 3 flow.     - Left  Anterior Descending Artery:             The proximal LAD has a 80% stenosis. There is BAKARI 3 flow.             The mid LAD has a 90% stenosis. There is BAKARI 3 flow. The remaining portion of the vessel is diffusely diseased.     - Left Circumflex Artery:             The mid LCX has a 60% stenosis. There is BAKARI 3 flow. The remaining portion of the vessel has luminal irregularities.     - Right Coronary Artery:             The proximal RCA has a 60% stenosis. There is BAKARI 3 flow.             The mid RCA has a 80% stenosis. There is BAKARI 3 flow. The remaining portion of the vessel has luminal irregularities.     - Posterior Descending Artery:             The proximal PDA has a 60% stenosis. There is BAKARI 3 flow. The remaining portion of the vessel has luminal irregularities.

## 2018-12-11 NOTE — HPI
Mr. Ray iss a 59 y/o male who was admitted on 12/10/18 for a left foot infection (cultures have grown Acenitobacter and E faecalis). Attempts were made to place a PICC and start meropenem as an outpatient, but he was not able to have the PICC placed until Wednesday so he presented to the ED. He was admitted to Observation and a PICC was placed. Shortly before discharge, Mr. Ray was noted to be in cardiac arrest. There were no clear precipitating events and he was not on telemetry (although he does have an AICD in place). The code lasted ~5 minutes with ROSC after 1 epinephrine. He was subsequently intubated and transferred to the ICU.

## 2018-12-11 NOTE — ASSESSMENT & PLAN NOTE
--Undifferentiated at this point. May be due to sedatives. Stop carvedilol.   --2D echo, trend troponin, etc.  --Place arterial line.   --Titrate norepinephrine to keep MAP>65mmHg.   --High risk for worsening in need of close monitoring.

## 2018-12-11 NOTE — ASSESSMENT & PLAN NOTE
Patient with previous LAYNE . Baseline 0.8-0.9. sCR 1.2 this am. Likely prerenal given history. Hold lisinopril and lasix. Avoid nephrotoxins. Repeat labs as now likely worsen after code.

## 2018-12-11 NOTE — ASSESSMENT & PLAN NOTE
Tissue culture obtained by Vascular Surgery obtained 12/5/18 grew Acinetobacter baumannii/haemolyticus sensitive to meropenem.  Continue IV meropenem. Appreciate ID consult.    [id number] Detailed

## 2018-12-11 NOTE — NURSING
"Wound care performed to left foot. Old dressing removed. Area cleaned with normal saline, santyl applied to all yellow/white areas. Wet gauze applied, abd pads and kerlix applied and secured. Patient tolerated well. Patient stated "My foot is starting to feel better". Will continue to monitor.   "

## 2018-12-11 NOTE — ASSESSMENT & PLAN NOTE
Last HgbA1c   Lab Results   Component Value Date    HGBA1C 8.3 (H) 10/30/2018   Hold oral antihyperglycemics while inpatient  PRN sliding scale insulin

## 2018-12-11 NOTE — ASSESSMENT & PLAN NOTE
No evidence of acute decompensation or fluid overload, continue home med regimen, I/O's, daily weights. EF 25% with AICD 11/2018. Lasix held this am due to worsen renal function and history of poor oral intake prior to admission. Continue ACEI, BB. Hold lisinopril and lasix for now.

## 2018-12-11 NOTE — HOSPITAL COURSE
Patient admitted to observation 12/10/2018 for PICC line placement for IV antibiotics for left wound infection (12/5/18 wound culture Acinetobacter baumannii/haemolyticus sensitive to meropenem. IV meropenum started in ED.   On 12/11/18 as he was dressing for discharge, he suffered a cardiac arrest with ROSC after ACLS. Per his sister, he was standing getting ready to leave the hospital and then fell back into the bed. He was intubated during the code and transferred to ICU. He was hypotensive after the arrest requiring Levophed. Cardiology and Pulmonary consulted. ICD interrogated- no events, no shocks. Suspect PEA arrest due to severe infection with underlying biventricular failure and severe CAD. TTE with EF 30%, diastolic dysfunction, new RV changes. CT negative for PE. Troponins elevated at 0.5 but downtrended. Initially started ASA, Plavix, and full dose Lovenox with concern for NSTEMI, but unlikely NSTEMI given minimal troponin elevations that could be explained by chest compressions alone. Lovenox discontinued. Continue DAPT given severe CAD with no intervention (not a candidate for PCI or CABG given anatomy). Blood cultures checked and NGTD. Wound care following for DM foot wounds. Abdominal US with ascites s/p paracentesis with 2.4L of clear light green ascites removed on 12/12. SAAG 0.6 which is NOT consistent with portal hypertension. Total protein 4.2 which would be consistent with cardiac etiology. No SBP. No pancreatitis on CT abd/pelvis and none clinically either. UA and UPC did not show protein; ascites not from nephrotic syndrome. Cytology showed reactive mesothelial cells and many neutrophils on a fibrinous background but no evidence of malignancy. Ascites AFB culture pending. He was extubated to BiPAP on 12/13 and weaned to nasal cannula.  Levophed off since 12/14. He continued to improve with no other acute events during hospitalization. He was stable for step down to floor on 12/15.  PT/OT  recommending SNF vs LTAC. SW consulted for placement for continued care. He was accepted to LTAC and transferred on 12/20/2018.

## 2018-12-11 NOTE — ASSESSMENT & PLAN NOTE
Unclear precipitant  Not on tele when this occurred, but has ICD in place  [facility] called to interrogate  No immediate premorbid anginal sxs  Currently in SR

## 2018-12-11 NOTE — ASSESSMENT & PLAN NOTE
--Uncertain etiology. Pt was noted to be in asystole at first rhythm check, but CPR started beforehand.   --Hold on CTA given lack of significant hypoxia, although keep PE in the differential. Stat LE DVT study.   --Stat EKG unremarkable for ST changes or T wave inversion. Send stat troponin.   --Check 2D Echo.   --AICD interrogation ordered.

## 2018-12-11 NOTE — SUBJECTIVE & OBJECTIVE
Past Medical History:   Diagnosis Date    Arthritis     Cellulitis     CKD (chronic kidney disease), stage III     Coronary artery disease     Diabetes mellitus     Diabetic retinopathy     Diabetic ulcer of left foot     Glaucoma     Gout     Hyperlipemia     Hypertension     ICD (implantable cardioverter-defibrillator) in place 11/02/2018    Left chest    Non-pressure chronic ulcer of other part of left foot with fat layer exposed 10/23/2018    PVD (peripheral vascular disease)     Type 2 diabetes mellitus with left diabetic foot ulcer 10/29/2018    Unsteady gait     uses a wheelchair       Past Surgical History:   Procedure Laterality Date    AHMED GLAUCOMA IMPLANT Left 2011    DONE AT [facility]    AMPUTATION, TOE Left 12/5/2018    Performed by Mitch Chan MD at [facility] OR    AMPUTATION, TOES  2-5 Left 11/16/2018    Performed by Mitch Chan MD at [facility] OR    BAERVELDT GLAUCOMA IMPLANT Left 2012    WITH CATARACT EXTRACTION//DONE AT [facility]    CARDIAC CATHETERIZATION Left 05/2016    CARDIAC DEFIBRILLATOR PLACEMENT Left 11/02/2018    CATARACT EXTRACTION W/  INTRAOCULAR LENS IMPLANT Left 2012    WITH BAERVEDT//DONE AT [facility]    CATARACT EXTRACTION W/  INTRAOCULAR LENS IMPLANT Right 09/26/2018    COMPLEX ()    CB DESTRUCTION WITH CYCLO G6 Left 02/15/2017        CYST REMOVAL      Exam under anesthesia, left foot debridement, washout and all other indicated procedures Left 12/5/2018    Performed by Mitch Chan [facility] at [facility] OR    EXAMINATION UNDER ANESTHESIA Left 12/5/2018    Procedure: Exam under anesthesia, left foot debridement, washout and all other indicated procedures;  Surgeon: Mitch Chan MD;  Location: [facility] OR;  Service: Vascular;  Laterality: Left;  1030AM START  RN PREOP 12/3/2018-----AICD  SEEN BY DR JAMES 12/4    HEART CATH-LEFT N/A 5/6/2016    Performed by Mike Magana [facility] at [facility] CATH LAB    INCISION AND DRAINAGE Left  11/14/2018    Procedure: Incision and Drainage, left lower extremity debridement, washout;  Surgeon: Mitch Chan MD;  Location: [facility] OR;  Service: Vascular;  Laterality: Left;    INCISION AND DRAINAGE Left 11/16/2018    Procedure: INCISION AND DRAINAGE;  Surgeon: Mitch Chan MD;  Location: [facility] OR;  Service: Vascular;  Laterality: Left;    INCISION AND DRAINAGE Left 11/16/2018    Performed by Mitch Chan MD at [facility] OR    Incision and Drainage, left lower extremity debridement, washout Left 11/14/2018    Performed by Mitch Chan MD at [facility] OR    INSERTION, ICD GENERATOR, SINGLE CHAMBER N/A 11/2/2018    Performed by Pernell Greer MD at [facility] CATH LAB    INSERTION, IOL PROSTHESIS Right 9/26/2018    Performed by Perla Cortés MD at [facility] OR [address] [place] [address]    INTRAOCULAR PROSTHESES INSERTION Right 9/26/2018    Procedure: INSERTION, IOL PROSTHESIS;  Surgeon: Perla Cortés MD;  Location: [facility] OR [address] [place] [address];  Service: Ophthalmology;  Laterality: Right;    PHACOEMULSIFICATION OF CATARACT Right 9/26/2018    Procedure: PHACOEMULSIFICATION, CATARACT;  Surgeon: Perla Cortés MD;  Location: [facility] OR [address] [place] [address];  Service: Ophthalmology;  Laterality: Right;    PHACOEMULSIFICATION, CATARACT Right 9/26/2018    Performed by Perla Cortés MD at [facility] OR [address] [place] [address]    Right foot surgery  10/2014    TOE AMPUTATION Right     first and second    TOE AMPUTATION Left 11/16/2018    Procedure: AMPUTATION, TOES  2-5;  Surgeon: Mitch Chan MD;  Location: [facility] OR;  Service: Vascular;  Laterality: Left;    TOE AMPUTATION Left 12/5/2018    Procedure: AMPUTATION, TOE;  Surgeon: Mitch Chan MD;  Location: [facility] OR;  Service: Vascular;  Laterality: Left;  left great toe    TONSILLECTOMY      TRABECULECTOMY/G 6 LASER Left 2/15/2017    Performed by Perla Cortés MD at [facility] OR [address] [place] [address]       Review of patient's allergies indicates:   Allergen Reactions     Statins-hmg-coa reductase inhibitors      Generalized Pain    Onglyza [saxagliptin]     Penicillins Rash       Family History     Problem Relation (Age of Onset)    Diabetes Mother    Heart disease Brother    No Known Problems Father, Sister, Maternal Aunt, Maternal Uncle, Paternal Aunt, Paternal Uncle, Maternal Grandmother, Maternal Grandfather, Paternal Grandmother, Paternal Grandfather        Tobacco Use    Smoking status: Former Smoker     Packs/day: 1.00     Years: 3.00     Pack years: 3.00     Types: Cigarettes     Last attempt to quit: 1984     Years since quittin.4    Smokeless tobacco: Never Used   Substance and Sexual Activity    Alcohol use: Yes     Alcohol/week: 0.6 oz     Types: 1 Cans of beer per week     Comment: Occasional    Drug use: No    Sexual activity: Not Currently         Review of Systems   Unable to perform ROS: Intubated     Objective:     Vital Signs (Most Recent):  Temp: 98 °F (36.7 °C) (18 1131)  Pulse: 72 (18 1520)  Resp: (!) 27 (18 1520)  BP: (!) 87/51 (18 1445)  SpO2: (!) 92 % (18 1520) Vital Signs (24h Range):  Temp:  [97.7 °F (36.5 °C)-98.9 °F (37.2 °C)] 98 °F (36.7 °C)  Pulse:  [] 72  Resp:  [18-33] 27  SpO2:  [87 %-100 %] 92 %  BP: ()/(43-71) 87/51     Weight: 110.2 kg (242 lb 15.2 oz)  Body mass index is 34.86 kg/m².      Intake/Output Summary (Last 24 hours) at 2018 1528  Last data filed at 2018 0600  Gross per 24 hour   Intake 940 ml   Output 700 ml   Net 240 ml       Physical Exam   Constitutional: He appears well-developed and well-nourished. He is intubated.   HENT:   Head: Normocephalic and atraumatic.   Mouth/Throat: Oropharynx is clear and moist.   Eyes: Conjunctivae are normal. Pupils are equal, round, and reactive to light. Right eye exhibits no discharge. Left eye exhibits no discharge. No scleral icterus.   Neck: Trachea normal, normal range of motion and full passive range of motion without pain.  Neck supple. No JVD present. No tracheal deviation present. No thyromegaly present.   Cardiovascular: Normal rate, regular rhythm, S1 normal, S2 normal, normal heart sounds and intact distal pulses. Exam reveals no gallop and no friction rub.   No murmur heard.  Pulmonary/Chest: Breath sounds normal. He is intubated. No respiratory distress. He has no wheezes. He has no rales. He exhibits no tenderness.   Abdominal: Soft. Bowel sounds are normal. He exhibits distension. He exhibits no mass. There is no tenderness. There is no rebound and no guarding.   Musculoskeletal: Normal range of motion. He exhibits no tenderness or deformity.   Lymphadenopathy:     He has no cervical adenopathy.   Neurological: No cranial nerve deficit. Coordination normal.   Skin: Skin is warm and dry. No abrasion and no bruising noted.   Psychiatric: He has a normal mood and affect.   Vitals reviewed.      Vents:  Vent Mode: PRVC (12/11/18 1520)  Ventilator Initiated: Yes (12/11/18 1413)  Set Rate: 16 bmp (12/11/18 1520)  Vt Set: 460 mL (12/11/18 1520)  PEEP/CPAP: 8 cmH20 (12/11/18 1520)  Oxygen Concentration (%): 40 (12/11/18 1520)  Peak Airway Pressure: 28.4 cmH2O (12/11/18 1520)  Total Ve: 11.2 mL (12/11/18 1520)  F/VT Ratio<105 (RSBI): (!) 58.44 (12/11/18 1520)    Lines/Drains/Airways     Peripherally Inserted Central Catheter Line                 PICC Double Lumen 12/10/18 2241 right basilic less than 1 day          Drain                 Open Drain 11/14/18 1353 Left Foot Other (see comments) Other (see comments) 27 days         NG/OG Tube 12/11/18 1350 orogastric 7.5 Fr. Center mouth less than 1 day         Urethral Catheter 12/11/18 1351 Double-lumen 16 Fr. less than 1 day          Airway                 Airway - Non-Surgical 11/16/18 0815 Other (Comment) 25 days         Airway - Non-Surgical 12/11/18 1320 Endotracheal Tube less than 1 day                Significant Labs:    CBC/Anemia Profile:  Recent Labs   Lab 12/10/18  1840  12/11/18  0604 12/11/18  1406   WBC 7.18 8.68 8.50   HGB 9.2* 8.5* 8.6*   HCT 28.2* 26.6* 26.2*    327 297   MCV 94 95 95   RDW 16.8* 16.9* 16.8*        Chemistries:  Recent Labs   Lab 12/09/18  1905 12/10/18  1718 12/11/18  0604    139 139   K 4.3 4.0 4.1    106 106   CO2 26 26 26   BUN 59* 56* 58*   CREATININE 1.0 1.1 1.2   CALCIUM 8.8 8.7 8.5*   ALBUMIN 2.2*  --  2.1*   PROT 7.0  --  6.8   BILITOT 0.4  --  0.5   ALKPHOS 127  --  128   ALT 18  --  16   AST 21  --  20     Significant Imaging:   I have reviewed and interpreted all pertinent imaging results/findings within the past 24 hours.

## 2018-12-12 ENCOUNTER — APPOINTMENT (OUTPATIENT)
Dept: RADIOLOGY | Facility: HOSPITAL | Age: 58
End: 2018-12-12
Attending: HOSPITALIST
Payer: COMMERCIAL

## 2018-12-12 PROBLEM — E87.5 HYPERKALEMIA: Status: RESOLVED | Noted: 2018-11-16 | Resolved: 2018-12-12

## 2018-12-12 PROBLEM — R79.89 ELEVATED TROPONIN: Status: ACTIVE | Noted: 2018-12-12

## 2018-12-12 PROBLEM — R18.8 OTHER ASCITES: Status: ACTIVE | Noted: 2018-12-12

## 2018-12-12 PROBLEM — E43 SEVERE MALNUTRITION: Status: ACTIVE | Noted: 2018-12-12

## 2018-12-12 LAB
ALBUMIN SERPL BCP-MCNC: 2.1 G/DL
ALLENS TEST: ABNORMAL
ALP SERPL-CCNC: 141 U/L
ALT SERPL W/O P-5'-P-CCNC: 19 U/L
ANION GAP SERPL CALC-SCNC: 7 MMOL/L
APPEARANCE FLD: NORMAL
AST SERPL-CCNC: 20 U/L
BASOPHILS # BLD AUTO: 0.04 K/UL
BASOPHILS NFR BLD: 0.5 %
BILIRUB SERPL-MCNC: 0.6 MG/DL
BODY FLD TYPE: NORMAL
BODY FLUID COMMENTS: NORMAL
BUN SERPL-MCNC: 50 MG/DL
CALCIUM SERPL-MCNC: 8.6 MG/DL
CHLORIDE SERPL-SCNC: 107 MMOL/L
CO2 SERPL-SCNC: 25 MMOL/L
COLOR FLD: YELLOW
CREAT SERPL-MCNC: 1.1 MG/DL
DELSYS: ABNORMAL
DIFFERENTIAL METHOD: ABNORMAL
EOSINOPHIL # BLD AUTO: 0.1 K/UL
EOSINOPHIL NFR BLD: 1.8 %
ERYTHROCYTE [DISTWIDTH] IN BLOOD BY AUTOMATED COUNT: 16.6 %
ERYTHROCYTE [SEDIMENTATION RATE] IN BLOOD BY WESTERGREN METHOD: 16 MM/H
EST. GFR  (AFRICAN AMERICAN): >60 ML/MIN/1.73 M^2
EST. GFR  (NON AFRICAN AMERICAN): >60 ML/MIN/1.73 M^2
FIO2: 40
GLUCOSE SERPL-MCNC: 153 MG/DL
HCO3 UR-SCNC: 22.6 MMOL/L (ref 24–28)
HCT VFR BLD AUTO: 27.6 %
HGB BLD-MCNC: 9.1 G/DL
LYMPHOCYTES # BLD AUTO: 0.5 K/UL
LYMPHOCYTES NFR BLD: 6.4 %
LYMPHOCYTES NFR FLD MANUAL: 40 %
MCH RBC QN AUTO: 30.4 PG
MCHC RBC AUTO-ENTMCNC: 33 G/DL
MCV RBC AUTO: 92 FL
MIN VOL: 8.8
MODE: ABNORMAL
MONOCYTES # BLD AUTO: 1 K/UL
MONOCYTES NFR BLD: 12.7 %
MONOS+MACROS NFR FLD MANUAL: 2 %
NEUTROPHILS # BLD AUTO: 6.1 K/UL
NEUTROPHILS NFR BLD: 78.6 %
NEUTROPHILS NFR FLD MANUAL: 58 %
PCO2 BLDA: 33.6 MMHG (ref 35–45)
PEEP: 8
PH SMN: 7.44 [PH] (ref 7.35–7.45)
PIP: 25
PLATELET # BLD AUTO: 343 K/UL
PMV BLD AUTO: 8.7 FL
PO2 BLDA: 104 MMHG (ref 80–100)
POC BE: -1 MMOL/L
POC SATURATED O2: 98 % (ref 95–100)
POC TCO2: 24 MMOL/L (ref 23–27)
POCT GLUCOSE: 161 MG/DL (ref 70–110)
POCT GLUCOSE: 161 MG/DL (ref 70–110)
POCT GLUCOSE: 173 MG/DL (ref 70–110)
POTASSIUM SERPL-SCNC: 3.8 MMOL/L
PROT SERPL-MCNC: 6.8 G/DL
RBC # BLD AUTO: 2.99 M/UL
SAMPLE: ABNORMAL
SITE: ABNORMAL
SODIUM SERPL-SCNC: 139 MMOL/L
SP02: 97
TROPONIN I SERPL DL<=0.01 NG/ML-MCNC: 0.34 NG/ML
VT: 440
WBC # BLD AUTO: 7.8 K/UL
WBC # FLD: 339 /CU MM

## 2018-12-12 PROCEDURE — 25000003 PHARM REV CODE 250: Performed by: HOSPITALIST

## 2018-12-12 PROCEDURE — 87015 SPECIMEN INFECT AGNT CONCNTJ: CPT

## 2018-12-12 PROCEDURE — 88305 TISSUE EXAM BY PATHOLOGIST: CPT | Mod: 26,,, | Performed by: PATHOLOGY

## 2018-12-12 PROCEDURE — 84484 ASSAY OF TROPONIN QUANT: CPT

## 2018-12-12 PROCEDURE — 20000000 HC ICU ROOM

## 2018-12-12 PROCEDURE — 85025 COMPLETE CBC W/AUTO DIFF WBC: CPT

## 2018-12-12 PROCEDURE — 63600175 PHARM REV CODE 636 W HCPCS: Performed by: HOSPITALIST

## 2018-12-12 PROCEDURE — 71275 CT ANGIOGRAPHY CHEST: CPT | Mod: TC

## 2018-12-12 PROCEDURE — 87075 CULTR BACTERIA EXCEPT BLOOD: CPT

## 2018-12-12 PROCEDURE — 25000003 PHARM REV CODE 250: Performed by: INTERNAL MEDICINE

## 2018-12-12 PROCEDURE — 37799 UNLISTED PX VASCULAR SURGERY: CPT

## 2018-12-12 PROCEDURE — 25000003 PHARM REV CODE 250: Performed by: EMERGENCY MEDICINE

## 2018-12-12 PROCEDURE — 27000221 HC OXYGEN, UP TO 24 HOURS

## 2018-12-12 PROCEDURE — 80053 COMPREHEN METABOLIC PANEL: CPT

## 2018-12-12 PROCEDURE — 89051 BODY FLUID CELL COUNT: CPT

## 2018-12-12 PROCEDURE — 87070 CULTURE OTHR SPECIMN AEROBIC: CPT

## 2018-12-12 PROCEDURE — 99291 CRITICAL CARE FIRST HOUR: CPT | Mod: ,,, | Performed by: INTERNAL MEDICINE

## 2018-12-12 PROCEDURE — 99291 CRITICAL CARE FIRST HOUR: CPT | Mod: ,,, | Performed by: EMERGENCY MEDICINE

## 2018-12-12 PROCEDURE — A4216 STERILE WATER/SALINE, 10 ML: HCPCS | Performed by: INTERNAL MEDICINE

## 2018-12-12 PROCEDURE — 88112 CYTOPATH CELL ENHANCE TECH: CPT | Mod: 26,,, | Performed by: PATHOLOGY

## 2018-12-12 PROCEDURE — 87116 MYCOBACTERIA CULTURE: CPT

## 2018-12-12 PROCEDURE — 63600175 PHARM REV CODE 636 W HCPCS: Performed by: NURSE PRACTITIONER

## 2018-12-12 PROCEDURE — 87206 SMEAR FLUORESCENT/ACID STAI: CPT

## 2018-12-12 PROCEDURE — 25000003 PHARM REV CODE 250: Performed by: NURSE PRACTITIONER

## 2018-12-12 PROCEDURE — 36415 COLL VENOUS BLD VENIPUNCTURE: CPT

## 2018-12-12 PROCEDURE — S0028 INJECTION, FAMOTIDINE, 20 MG: HCPCS | Performed by: HOSPITALIST

## 2018-12-12 PROCEDURE — 84157 ASSAY OF PROTEIN OTHER: CPT

## 2018-12-12 PROCEDURE — 0W9G3ZZ DRAINAGE OF PERITONEAL CAVITY, PERCUTANEOUS APPROACH: ICD-10-PCS | Performed by: HOSPITALIST

## 2018-12-12 PROCEDURE — 25500020 PHARM REV CODE 255

## 2018-12-12 PROCEDURE — 71275 CTA CHEST NON CORONARY: ICD-10-PCS | Mod: 26,,, | Performed by: RADIOLOGY

## 2018-12-12 PROCEDURE — 94761 N-INVAS EAR/PLS OXIMETRY MLT: CPT

## 2018-12-12 PROCEDURE — 71275 CT ANGIOGRAPHY CHEST: CPT | Mod: 26,,, | Performed by: RADIOLOGY

## 2018-12-12 PROCEDURE — 99223 1ST HOSP IP/OBS HIGH 75: CPT | Mod: ,,, | Performed by: INTERNAL MEDICINE

## 2018-12-12 PROCEDURE — 25500020 PHARM REV CODE 255: Performed by: HOSPITALIST

## 2018-12-12 PROCEDURE — 99900035 HC TECH TIME PER 15 MIN (STAT)

## 2018-12-12 PROCEDURE — 99900026 HC AIRWAY MAINTENANCE (STAT)

## 2018-12-12 PROCEDURE — 82803 BLOOD GASES ANY COMBINATION: CPT

## 2018-12-12 PROCEDURE — 82042 OTHER SOURCE ALBUMIN QUAN EA: CPT

## 2018-12-12 PROCEDURE — 88112 CYTOPATH CELL ENHANCE TECH: CPT | Performed by: PATHOLOGY

## 2018-12-12 PROCEDURE — 88305 TISSUE EXAM BY PATHOLOGIST: CPT | Performed by: PATHOLOGY

## 2018-12-12 RX ORDER — FENTANYL CITRATE 50 UG/ML
100 INJECTION, SOLUTION INTRAMUSCULAR; INTRAVENOUS
Status: DISCONTINUED | OUTPATIENT
Start: 2018-12-12 | End: 2018-12-14

## 2018-12-12 RX ORDER — ENOXAPARIN SODIUM 100 MG/ML
40 INJECTION SUBCUTANEOUS EVERY 24 HOURS
Status: DISCONTINUED | OUTPATIENT
Start: 2018-12-12 | End: 2018-12-20 | Stop reason: HOSPADM

## 2018-12-12 RX ADMIN — ASPIRIN 81 MG 81 MG: 81 TABLET ORAL at 08:12

## 2018-12-12 RX ADMIN — NOREPINEPHRINE-DEXTROSE IV SOLUTION 4 MG/250ML-5% 0.04 MCG/KG/MIN: 4-5/250 SOLUTION at 07:12

## 2018-12-12 RX ADMIN — ENOXAPARIN SODIUM 110 MG: 150 INJECTION SUBCUTANEOUS at 04:12

## 2018-12-12 RX ADMIN — PROPOFOL 40 MCG/KG/MIN: 10 INJECTION, EMULSION INTRAVENOUS at 12:12

## 2018-12-12 RX ADMIN — Medication 10 ML: at 05:12

## 2018-12-12 RX ADMIN — MEROPENEM 2 G: 1 INJECTION, POWDER, FOR SOLUTION INTRAVENOUS at 09:12

## 2018-12-12 RX ADMIN — IOHEXOL 80 ML: 350 INJECTION, SOLUTION INTRAVENOUS at 11:12

## 2018-12-12 RX ADMIN — ENOXAPARIN SODIUM 40 MG: 100 INJECTION SUBCUTANEOUS at 05:12

## 2018-12-12 RX ADMIN — BRIMONIDINE TARTRATE 1 DROP: 1 SOLUTION/ DROPS OPHTHALMIC at 08:12

## 2018-12-12 RX ADMIN — NOREPINEPHRINE-DEXTROSE IV SOLUTION 4 MG/250ML-5% 0.06 MCG/KG/MIN: 4-5/250 SOLUTION at 04:12

## 2018-12-12 RX ADMIN — MEROPENEM 2 G: 1 INJECTION, POWDER, FOR SOLUTION INTRAVENOUS at 01:12

## 2018-12-12 RX ADMIN — COLLAGENASE SANTYL: 250 OINTMENT TOPICAL at 08:12

## 2018-12-12 RX ADMIN — PROPOFOL 40 MCG/KG/MIN: 10 INJECTION, EMULSION INTRAVENOUS at 08:12

## 2018-12-12 RX ADMIN — FUROSEMIDE 40 MG: 10 INJECTION, SOLUTION INTRAMUSCULAR; INTRAVENOUS at 08:12

## 2018-12-12 RX ADMIN — FAMOTIDINE 20 MG: 10 INJECTION INTRAVENOUS at 08:12

## 2018-12-12 RX ADMIN — PROPOFOL 35 MCG/KG/MIN: 10 INJECTION, EMULSION INTRAVENOUS at 06:12

## 2018-12-12 RX ADMIN — MEROPENEM 2 G: 1 INJECTION, POWDER, FOR SOLUTION INTRAVENOUS at 05:12

## 2018-12-12 RX ADMIN — PROPOFOL 40 MCG/KG/MIN: 10 INJECTION, EMULSION INTRAVENOUS at 04:12

## 2018-12-12 RX ADMIN — NOREPINEPHRINE-DEXTROSE IV SOLUTION 4 MG/250ML-5% 0.02 MCG/KG/MIN: 4-5/250 SOLUTION at 11:12

## 2018-12-12 RX ADMIN — Medication: at 08:12

## 2018-12-12 RX ADMIN — NOREPINEPHRINE-DEXTROSE IV SOLUTION 4 MG/250ML-5% 0.08 MCG/KG/MIN: 4-5/250 SOLUTION at 11:12

## 2018-12-12 RX ADMIN — PROPOFOL 35 MCG/KG/MIN: 10 INJECTION, EMULSION INTRAVENOUS at 02:12

## 2018-12-12 RX ADMIN — EZETIMIBE 10 MG: 10 TABLET ORAL at 08:12

## 2018-12-12 RX ADMIN — PROPOFOL 40 MCG/KG/MIN: 10 INJECTION, EMULSION INTRAVENOUS at 09:12

## 2018-12-12 RX ADMIN — CLOPIDOGREL BISULFATE 75 MG: 75 TABLET ORAL at 08:12

## 2018-12-12 RX ADMIN — PREDNISOLONE ACETATE 1 DROP: 10 SUSPENSION/ DROPS OPHTHALMIC at 08:12

## 2018-12-12 RX ADMIN — PROPOFOL 40 MCG/KG/MIN: 10 INJECTION, EMULSION INTRAVENOUS at 11:12

## 2018-12-12 RX ADMIN — CHLORHEXIDINE GLUCONATE 0.12% ORAL RINSE 15 ML: 1.2 LIQUID ORAL at 08:12

## 2018-12-12 RX ADMIN — INSULIN ASPART 2 UNITS: 100 INJECTION, SOLUTION INTRAVENOUS; SUBCUTANEOUS at 06:12

## 2018-12-12 RX ADMIN — ALLOPURINOL 200 MG: 100 TABLET ORAL at 08:12

## 2018-12-12 RX ADMIN — FENTANYL CITRATE 100 MCG: 50 INJECTION INTRAMUSCULAR; INTRAVENOUS at 05:12

## 2018-12-12 RX ADMIN — Medication 10 ML: at 12:12

## 2018-12-12 NOTE — ASSESSMENT & PLAN NOTE
Undifferentiated shock post cardiac arrest.- does not have any precipitating arrhythmia on ICD interrogation..  Very poor ejection fraction with evidence of right heart failure.  Clinically with coarse volume overload.  He has known wound infection demonstrating Acinetobacter and has been on ongoing meropenem.  Shock appears multifactorial in likely secondary to cardiac decompensation and sedative infusions to facilitate mechanical ventilation.    -- continue with norepinephrine to maintain map greater than 65.  requirements are downtrending in the last 24 hr.  -- continue with diuresis to optimize cardiac function. He has responded well thus far.  -- meropenem for known wound infection.  Otherwise he has been afebrile and no additional clear source of secondary infection noted  -- follow cultures

## 2018-12-12 NOTE — ASSESSMENT & PLAN NOTE
Baseline Cr ~0.9  Cr increased to 1.4 post code, now improved to 1.1  Continue to monitor  Renal dose all meds, avoid nephrotoxins

## 2018-12-12 NOTE — NURSING
Paracentesis done at bedside with Dr. Sotomayor.  2.3L drained from site.  Fluid is clear and green.  Samples sent to lab

## 2018-12-12 NOTE — PROCEDURES
"Marvin Ray is a 58 y.o. male patient.    Temp: 97.9 °F (36.6 °C) (12/12/18 1200)  Pulse: 80 (12/12/18 1430)  Resp: (!) 23 (12/12/18 1430)  BP: 117/62 (12/12/18 1000)  SpO2: 95 % (12/12/18 1430)  Weight: 109.8 kg (242 lb) (12/11/18 1520)  Height: 5' 10" (177.8 cm) (12/11/18 1520)       Paracentesis  Date/Time: 12/12/2018 4:38 PM  Location procedure was performed: [facility] ICU  Performed by: Jazz Sotomayor MD  Authorized by: Jazz Sotomayor MD   Pre-operative diagnosis: ascites  Post-operative diagnosis: ascites  Consent Done: Yes  Consent: Verbal consent not obtained. Written consent obtained.  Consent given by: daughter.  Site marked: the operative site was marked  Imaging studies: imaging studies available  Patient identity confirmed: name and MRN  Time out: Immediately prior to procedure a "time out" was called to verify the correct patient, procedure, equipment, support staff and site/side marked as required.  Initial or subsequent exam: initial  Procedure purpose: diagnostic and therapeutic  Indications: respiratory distress secondary to ascites and suspected peritonitis  Anesthesia: local infiltration    Anesthesia:  Local Anesthetic: lidocaine 1% without epinephrine  Patient sedated: yes  Sedatives: propofol (for mechanical ventilation)  Vitals: Vital signs were monitored during sedation.  Preparation: Patient was prepped and draped in the usual sterile fashion.  Description of findings: clear light green fluid   Ultrasound guidance: yes  Puncture site: right lower quadrant  Fluid removed: 2400(ml)  Fluid appearance: clear and bilious  Dressing: 4x4 sterile gauze and pressure dressing  Complications: No  Estimated blood loss (mL): 0        Jazz Sotomayor  12/12/2018 4:40 PM   "

## 2018-12-12 NOTE — ASSESSMENT & PLAN NOTE
Malnutrition in the context of Acute Illness/Injury    Related to (etiology):  Cardiac hx; s/p recent cardiac arrest    Signs and Symptoms (as evidenced by):  Body Fat Depletion: moderate depletion of orbitals and triceps   Muscle Mass Depletion: moderate depletion of temples, clavicle region, interosseous muscle and lower extremities   Fluid Accumulation: moderate    Interventions:  Collaboration w/ other providers    Nutrition Diagnosis Status:  New

## 2018-12-12 NOTE — ASSESSMENT & PLAN NOTE
Tissue culture obtained by Vascular Surgery obtained 12/5/18 grew Acinetobacter baumannii/haemolyticus sensitive to meropenem.    ID consulted  PICC in place R arm for outpatient antibiotics  Wound care following  ESR 72, CRP 89 on 12/11

## 2018-12-12 NOTE — PLAN OF CARE
Problem: Adult Inpatient Plan of Care  Goal: Patient-Specific Goal (Individualization)  Outcome: Ongoing (interventions implemented as appropriate)  Pt has remains in ICU on ventilator. Levophed weaned to 0.06mcg/kg/min. Propofol weaned to 35mcg/kg/min. Pt.without fever or injury this shift. Daughter remains at bedside.   12/12/18 0335   OTHER   Anxieties, Fears or Concerns Pt. intubated  (ERIK)       Problem: Skin Injury Risk Increased  Goal: Skin Health and Integrity  Outcome: Ongoing (interventions implemented as appropriate)  Intervention: Optimize Skin Protection   12/12/18 0200 12/12/18 0335   Prevent Additional Skin Injury   Head of Bed (HOB) HOB at 30-45 degrees --    Pressure Reduction Devices --  positioning supports utilized;specialty bed utilized   Pressure Reduction Techniques --  pressure points protected;weight shift assistance provided         Problem: Restraint, Nonbehavioral (Nonviolent)  Goal: Discontinuation Criteria Achieved  Outcome: Ongoing (interventions implemented as appropriate)  Intervention: Implement Least-restrictive Safety Strategies   12/11/18 2100 12/12/18 0315   Implement Least-restrictive Safety Strategies   Less Restrictive Alternatives calming techniques promoted;environment adjusted;family presence promoted --    Medical Device Protection --  IV pole/bag removed from visual field;tubing secured       Goal: Personal Dignity and Safety Maintained  Outcome: Ongoing (interventions implemented as appropriate)  Intervention: Protect Dignity, Rights, and Personal Wellbeing   12/12/18 0335   Support Dyspnea Relief   Trust Relationship/Rapport care explained         Problem: Communication Impairment (Mechanical Ventilation, Invasive)  Goal: Effective Communication  Outcome: Ongoing (interventions implemented as appropriate)  Intervention: Ensure Effective Communication   12/12/18 0335   Establish Effective Means of Communication   Communication Enhancement Strategies family involved in  communication plan         Problem: Inability to Wean (Mechanical Ventilation, Invasive)  Goal: Mechanical Ventilation Liberation  Outcome: Ongoing (interventions implemented as appropriate)  Intervention: Promote Extubation and Mechanical Ventilation Liberation   12/12/18 0335   Manage Acute Allergic Reaction   Medication Review/Management infusion titrated   Monitor/Manage Chemotherapy Gastrointestinal Effects   Environmental Support calm environment promoted   Prevent or Manage Pain   Sleep/Rest Enhancement family presence promoted;relaxation techniques promoted;therapeutic touch utilized         Problem: Skin and Tissue Injury (Mechanical Ventilation, Invasive)  Goal: Absence of Device-Related Skin and Tissue Injury  Outcome: Ongoing (interventions implemented as appropriate)  Intervention: Maintain Skin and Tissue Health   12/12/18 0335   Prevent Haritha-procedural Injury   Device Skin Pressure Protection absorbent pad utilized/changed;positioning supports utilized         Problem: Communication Impairment (Artificial Airway)  Goal: Effective Communication  Outcome: Ongoing (interventions implemented as appropriate)  Intervention: Ensure Effective Communication   12/12/18 0335   Establish Effective Means of Communication   Communication Enhancement Strategies family involved in communication plan         Problem: Device-Related Complication Risk (Artificial Airway)  Goal: Optimal Device Function  Outcome: Ongoing (interventions implemented as appropriate)  Intervention: Optimize Device Care and Function   12/12/18 0335   Support Asthma Symptom Control   Airway/Ventilation Management airway patency maintained   Optimize Device Care and Function   Airway Safety Measures suction at bedside   Prevent Deficiencies/Improve Nutritional Intake   Oral Care swabbed with antiseptic solution;lip lubricant applied         Problem: Skin and Tissue Injury (Artificial Airway)  Goal: Absence of Device-Related Skin or Tissue  Injury  Outcome: Ongoing (interventions implemented as appropriate)  Intervention: Maintain Skin and Tissue Health   12/12/18 0335   Prevent Haritha-procedural Injury   Device Skin Pressure Protection absorbent pad utilized/changed;positioning supports utilized         Problem: Noninvasive Ventilation Acute  Goal: Effective Unassisted Ventilation and Oxygenation  Outcome: Ongoing (interventions implemented as appropriate)  Intervention: Monitor and Manage Noninvasive Ventilation   12/12/18 0335   Support Asthma Symptom Control   Airway/Ventilation Management airway patency maintained

## 2018-12-12 NOTE — ASSESSMENT & PLAN NOTE
Troponin downtrending.  Not consistent with a primary cardiac event.  Likely strain secondary to respiratory failure and cardiac arrest.

## 2018-12-12 NOTE — ASSESSMENT & PLAN NOTE
Known MV CAD unamenable to CABG or PCI (per notes from 2016)  Severe LV dysfxn noted  St. Topher ICD in place  Mild flat trop elev likely demand related in setting of severe CAD, unlikely ACS.

## 2018-12-12 NOTE — ASSESSMENT & PLAN NOTE
Respiratory failure multifactorial.  Appears related to his underlying cardiac arrest and ongoing cardiac decompensation and gross volume overload with impaired diaphragmatic excursion secondary to tense abdominal ascites.  Has bilateral pleural effusions with significant ascites noted. I suspect that the pleural effusions are transudative and reflect ascitic fluid crossing the diaphragms.   Over the last 24 hr his gas exchange has improved and respiratory mechanics are adequate.    -- continue with diuresis as tolerated  -- needs large volume paracentesis  -- wean supplemental O2 to maintain SpO2 greater than 88%  -- lung protective ventilation  -- plan for SAT/SBT once paracentesis completed and volume status is optimized

## 2018-12-12 NOTE — ASSESSMENT & PLAN NOTE
Patient has severe 3v disease per [facility] in 2016 with no targets for PCI or CABG  Continue asa. Add plavix (discussed with Juan Antonio). Continue lipid management. Hold BB and ACEi for hypotension  With elevated troponin- see elsewhere

## 2018-12-12 NOTE — CONSULTS
Contacted by nursing at request of Dr. Sotomayor to assess left foot wounds while dressings removed.   Photodocumentation:    Left medial foot- Open wound with adherent yellow slough on medial heel and medial foot. Scant yellow drainage. Scattered dark areas consistent with dry tissue.    Left dorsal foot- Yellow adherent slough with scattered darkened areas that appear to be dry tissue. Scant serous drainage. No odor from foot wounds.    Left lateral foot- Same adherent dry yellow slough as remainder of wound.  Recommendations:  Continue local wound care with Santyl to debride wound and apply NS moistened gauze on Santyl. Use Z flex boot to offload pressure from heel. Discussed treatment plan with nursing and Dr. Sotomayor.

## 2018-12-12 NOTE — NURSING
1311 - Patient's sister called to station via call light that patient not breathing. Nurse, JILLIAN Alberto and Keshanna, PCT to bedside. No respirations or pulse noted. CPR started and Code Blue initiated. See Code Flowsheet.  1340 - Patient transferred to ICU by nurse, ICU nurse staff and RRT.

## 2018-12-12 NOTE — PLAN OF CARE
Recommendations     1. TF initiation of Peptamen Intense VHP @ 10ml/hr; adv as angel'd to a goal rate of 55ml/hr to provide 1320 cals (2017 cals w/ propofol), 121 gms prot, & 1109 mls free fl     2. Otherwise, adv diet as angel'd s/p extubation to Cardiac   Goals: TF initiation or diet advancement w/in 24 hrs  Nutrition Goal Status: new

## 2018-12-12 NOTE — SUBJECTIVE & OBJECTIVE
Past Medical History:   Diagnosis Date    Arthritis     Cellulitis     CKD (chronic kidney disease), stage III     Coronary artery disease     Diabetes mellitus     Diabetic retinopathy     Diabetic ulcer of left foot     Glaucoma     Gout     Hyperlipemia     Hypertension     ICD (implantable cardioverter-defibrillator) in place 11/02/2018    Left chest    Non-pressure chronic ulcer of other part of left foot with fat layer exposed 10/23/2018    PVD (peripheral vascular disease)     Type 2 diabetes mellitus with left diabetic foot ulcer 10/29/2018    Unsteady gait     uses a wheelchair     Interval History: Intubated, sedated.     Review of Systems   Unable to perform ROS: Intubated     Objective:     Vital Signs (Most Recent):  Temp: 97.2 °F (36.2 °C) (12/12/18 1915)  Pulse: 74 (12/12/18 2315)  Resp: 16 (12/12/18 2315)  BP: 139/70 (12/12/18 2300)  SpO2: 100 % (12/12/18 2315) Vital Signs (24h Range):  Temp:  [97.2 °F (36.2 °C)-98.4 °F (36.9 °C)] 97.2 °F (36.2 °C)  Pulse:  [65-87] 74  Resp:  [4-33] 16  SpO2:  [94 %-100 %] 100 %  BP: (107-139)/(57-75) 139/70  Arterial Line BP: ()/(39-75) 97/48     Weight: 109.8 kg (242 lb)  Body mass index is 34.72 kg/m².    Intake/Output Summary (Last 24 hours) at 12/12/2018 2344  Last data filed at 12/12/2018 2230  Gross per 24 hour   Intake 1372.27 ml   Output 5710 ml   Net -4337.73 ml      Physical Exam   Constitutional: He appears well-developed and well-nourished. No distress.   HENT:   Head: Normocephalic and atraumatic.   Mouth/Throat: Oropharynx is clear and moist.   ETT and OGT in place   Cardiovascular: Normal rate, regular rhythm, normal heart sounds and intact distal pulses. Exam reveals no gallop and no friction rub.   No murmur heard.  Pulmonary/Chest: Effort normal and breath sounds normal. No stridor. No respiratory distress. He has no wheezes. He has no rales.   Mechanical ventilation   Abdominal: Bowel sounds are normal. He exhibits  distension. He exhibits no mass. There is no tenderness. There is no guarding.   Musculoskeletal: He exhibits edema.   Neurological:   Sedated with propofol, awakens and turns head easily   Skin: He is not diaphoretic.   Bilateral feet dressed ; chronic vevous stasis changes. Dressings c/d/i  Nursing note and vitals reviewed.      Significant Labs: All pertinent labs within the past 24 hours have been reviewed.    Significant Imaging: I have reviewed and interpreted all pertinent imaging results/findings within the past 24 hours.    Past Surgical History:   Procedure Laterality Date    AHMED GLAUCOMA IMPLANT Left 2011    DONE AT [facility]    AMPUTATION, TOE Left 12/5/2018    Performed by Mitch Chan MD at [facility] OR    AMPUTATION, TOES  2-5 Left 11/16/2018    Performed by Mitch Chan MD at [facility] OR    BAERVELDT GLAUCOMA IMPLANT Left 2012    WITH CATARACT EXTRACTION//DONE AT [facility]    CARDIAC CATHETERIZATION Left 05/2016    CARDIAC DEFIBRILLATOR PLACEMENT Left 11/02/2018    CATARACT EXTRACTION W/  INTRAOCULAR LENS IMPLANT Left 2012    WITH BAERVEDT//DONE AT [facility]    CATARACT EXTRACTION W/  INTRAOCULAR LENS IMPLANT Right 09/26/2018    COMPLEX ()    CB DESTRUCTION WITH CYCLO G6 Left 02/15/2017        CYST REMOVAL      Exam under anesthesia, left foot debridement, washout and all other indicated procedures Left 12/5/2018    Performed by Mitch Chan MD at [facility] OR    EXAMINATION UNDER ANESTHESIA Left 12/5/2018    Procedure: Exam under anesthesia, left foot debridement, washout and all other indicated procedures;  Surgeon: Mitch Chan MD;  Location: [facility] OR;  Service: Vascular;  Laterality: Left;  1030AM START  RN PREOP 12/3/2018-----AICD  SEEN BY DR JAMES 12/4    HEART CATH-LEFT N/A 5/6/2016    Performed by Mike Magana MD at [facility] CATH LAB    INCISION AND DRAINAGE Left 11/14/2018    Procedure: Incision and Drainage, left lower extremity  debridement, washout;  Surgeon: Mitch Chan MD;  Location: [facility] OR;  Service: Vascular;  Laterality: Left;    INCISION AND DRAINAGE Left 11/16/2018    Procedure: INCISION AND DRAINAGE;  Surgeon: Mitch Chan MD;  Location: [facility] OR;  Service: Vascular;  Laterality: Left;    INCISION AND DRAINAGE Left 11/16/2018    Performed by Mitch Cahn MD at [facility] OR    Incision and Drainage, left lower extremity debridement, washout Left 11/14/2018    Performed by Mitch Chan MD at [facility] OR    INSERTION, ICD GENERATOR, SINGLE CHAMBER N/A 11/2/2018    Performed by Pernell Greer MD at [facility] CATH LAB    INSERTION, IOL PROSTHESIS Right 9/26/2018    Performed by Perla Cortés MD at [facility] OR [address] [place] [address]    INTRAOCULAR PROSTHESES INSERTION Right 9/26/2018    Procedure: INSERTION, IOL PROSTHESIS;  Surgeon: Perla Cortés MD;  Location: [facility] OR [address] [place] [address];  Service: Ophthalmology;  Laterality: Right;    PHACOEMULSIFICATION OF CATARACT Right 9/26/2018    Procedure: PHACOEMULSIFICATION, CATARACT;  Surgeon: Perla Cortés MD;  Location: [facility] OR [address] [place] [address];  Service: Ophthalmology;  Laterality: Right;    PHACOEMULSIFICATION, CATARACT Right 9/26/2018    Performed by Perla Cortés MD at [facility] OR [address] [place] [address]    Right foot surgery  10/2014    TOE AMPUTATION Right     first and second    TOE AMPUTATION Left 11/16/2018    Procedure: AMPUTATION, TOES  2-5;  Surgeon: Mitch Chan MD;  Location: [facility] OR;  Service: Vascular;  Laterality: Left;    TOE AMPUTATION Left 12/5/2018    Procedure: AMPUTATION, TOE;  Surgeon: Mitch Chan MD;  Location: [facility] OR;  Service: Vascular;  Laterality: Left;  left great toe    TONSILLECTOMY      TRABECULECTOMY/G 6 LASER Left 2/15/2017    Performed by Perla Cortés MD at [facility] OR [address] [place] [address]       Review of patient's allergies indicates:   Allergen Reactions    Statins-hmg-coa reductase inhibitors      Generalized Pain    Onglyza  "[saxagliptin]     Penicillins Rash       Medications:  Medications Prior to Admission   Medication Sig    allopurinol (ZYLOPRIM) 100 MG tablet Take 2 tablets (200 mg total) by mouth once daily.    amLODIPine (NORVASC) 5 MG tablet TAKE 1 TABLET(5 MG) BY MOUTH EVERY DAY    aspirin 81 MG Chew Take 81 mg by mouth once daily.    carvedilol (COREG) 6.25 MG tablet Take 1 tablet (6.25 mg total) by mouth 2 (two) times daily.    coenzyme Q10 100 mg capsule Take 100 mg by mouth every morning.    collagenase (SANTYL) ointment Apply topically once daily.    dorzolamide-timolol 2-0.5% (COSOPT) 22.3-6.8 mg/mL ophthalmic solution Place 1 drop into both eyes 3 (three) times daily.    ezetimibe (ZETIA) 10 mg tablet Take 1 tablet (10 mg total) by mouth once daily.    fish oil-omega-3 fatty acids 300-1,000 mg capsule Take 2 capsules (2 g total) by mouth 2 (two) times daily. (Patient taking differently: Take 1 g by mouth 2 (two) times daily. )    insulin glargine-lixisenatide (SOLIQUA 100/33) 100 unit-33 mcg/mL InPn Inject 34 units once daily    ketorolac 0.5% (ACULAR) 0.5 % Drop Place 1 drop into the right eye 3 (three) times daily.    MULTIVIT,THER IRON,CA,FA & MIN (MULTIVITAMIN) Tab Take 1 tablet by mouth every morning.     oxyCODONE-acetaminophen (PERCOCET) 5-325 mg per tablet Take 1 tablet by mouth every 6 (six) hours as needed for Pain.    pen needle, diabetic 31 gauge x 1/4" Ndle Use as directed    [DISCONTINUED] benazepril (LOTENSIN) 40 MG tablet TAKE 1 TABLET(40 MG) BY MOUTH TWICE DAILY    [DISCONTINUED] brimonidine 0.1% (ALPHAGAN P) 0.1 % Drop Place 1 drop into both eyes 3 (three) times daily.    [DISCONTINUED] furosemide (LASIX) 40 MG tablet Take 1 tablet (40 mg total) by mouth 2 (two) times daily.     Antibiotics (From admission, onward)    Start     Stop Route Frequency Ordered    12/11/18 1800  meropenem (MERREM) 2 g in sodium chloride 0.9% 100 mL IVPB      -- IV Every 8 hours (non-standard times) " 18 1143        Antifungals (From admission, onward)    Start     Stop Route Frequency Ordered    18 2100  miconazole NITRATE 2 % top powder      -- Top 2 times daily 18 1345        Antivirals (From admission, onward)    None           Immunization History   Administered Date(s) Administered    Influenza - Quadrivalent - PF 10/28/2013, 10/23/2014, 10/22/2015, 2017, 2018    Influenza - Trivalent - PF (PED) 10/22/2014    Pneumococcal Polysaccharide - 23 Valent 10/22/2015       Family History     Problem Relation (Age of Onset)    Diabetes Mother    Heart disease Brother    No Known Problems Father, Sister, Maternal Aunt, Maternal Uncle, Paternal Aunt, Paternal Uncle, Maternal Grandmother, Maternal Grandfather, Paternal Grandmother, Paternal Grandfather        Social History     Socioeconomic History    Marital status: Legally      Spouse name: None    Number of children: None    Years of education: 14    Highest education level: None   Social Needs    Financial resource strain: None    Food insecurity - worry: None    Food insecurity - inability: None    Transportation needs - medical: None    Transportation needs - non-medical: None   Occupational History    None   Tobacco Use    Smoking status: Former Smoker     Packs/day: 1.00     Years: 3.00     Pack years: 3.00     Types: Cigarettes     Last attempt to quit: 1984     Years since quittin.4    Smokeless tobacco: Never Used   Substance and Sexual Activity    Alcohol use: Yes     Alcohol/week: 0.6 oz     Types: 1 Cans of beer per week     Comment: Occasional    Drug use: No    Sexual activity: Not Currently   Other Topics Concern    None   Social History Narrative    None     Review of Systems  Objective:     Vital Signs (Most Recent):  Temp: 97.9 °F (36.6 °C) (18 1200)  Pulse: 80 (18 1430)  Resp: (!) 23 (18 1430)  BP: 117/62 (18 1000)  SpO2: 95 % (18 1430) Vital Signs  (24h Range):  Temp:  [97.9 °F (36.6 °C)-98.4 °F (36.9 °C)] 97.9 °F (36.6 °C)  Pulse:  [64-87] 80  Resp:  [4-25] 23  SpO2:  [94 %-100 %] 95 %  BP: (107-130)/(57-75) 117/62  Arterial Line BP: ()/(39-70) 78/70     Weight: 109.8 kg (242 lb)  Body mass index is 34.72 kg/m².    Estimated Creatinine Clearance: 88.6 mL/min (based on SCr of 1.1 mg/dL).    Physical Exam    Significant Labs: All pertinent labs within the past 24 hours have been reviewed.    Significant Imaging: I have reviewed all pertinent imaging results/findings within the past 24 hours.

## 2018-12-12 NOTE — ASSESSMENT & PLAN NOTE
Last lipids 10/2018 are well controlled  Continue home regimen of ezetimibe and fish oil (statin intolerance)

## 2018-12-12 NOTE — ASSESSMENT & PLAN NOTE
Hypotensive after code on 12/11  On levophed, requirements decreasing  Exact cause of shock unclear

## 2018-12-12 NOTE — PROGRESS NOTES
[facility]  Cardiology  Progress Note    Patient Name: Marvin Ray  MRN: [id number]  Admission Date: 12/10/2018  Hospital Length of Stay: 1 days  Code Status: Full Code   Attending Physician: Jazz Sotomayor MD   Primary Care Physician: Rubén Davis MD  Expected Discharge Date: 12/11/2018  Principal Problem:Cardiac arrest    Subjective:     Hospital Course:   12/11/18: admitted to ICU after cardiac arrest    Interval Hx: Pt seen in ICU, case d/w RN and Dr. Sotomayor.  Intubated/sedated but responsive to voice.    [facility] PPM interrogated 12/11/18, no tachy events.  Note made of episodic pacing at 40BPM, but unclear exact timing.  This does not appear to be an arrhythmic syncopal event/cardiac arrest based on these findings.    Tele: SR (pers rev)      Past Medical History:   Diagnosis Date    Arthritis     Cellulitis     CKD (chronic kidney disease), stage III     Coronary artery disease     Diabetes mellitus     Diabetic retinopathy     Diabetic ulcer of left foot     Glaucoma     Gout     Hyperlipemia     Hypertension     ICD (implantable cardioverter-defibrillator) in place 11/02/2018    Left chest    Non-pressure chronic ulcer of other part of left foot with fat layer exposed 10/23/2018    PVD (peripheral vascular disease)     Type 2 diabetes mellitus with left diabetic foot ulcer 10/29/2018    Unsteady gait     uses a wheelchair       Past Surgical History:   Procedure Laterality Date    AHMED GLAUCOMA IMPLANT Left 2011    DONE AT [facility]    AMPUTATION, TOE Left 12/5/2018    Performed by Mitch Chan [facility] at [facility] OR    AMPUTATION, TOES  2-5 Left 11/16/2018    Performed by [facility] MD at [facility] OR    BAERVELDT GLAUCOMA IMPLANT Left 2012    WITH CATARACT EXTRACTION//DONE AT [facility]    CARDIAC CATHETERIZATION Left 05/2016    CARDIAC DEFIBRILLATOR PLACEMENT Left 11/02/2018    CATARACT EXTRACTION W/  INTRAOCULAR LENS IMPLANT Left 2012    WITH  BAERVEDT//DONE AT [facility]    CATARACT EXTRACTION W/  INTRAOCULAR LENS IMPLANT Right 09/26/2018    COMPLEX ()    CB DESTRUCTION WITH CYCLO G6 Left 02/15/2017        CYST REMOVAL      Exam under anesthesia, left foot debridement, washout and all other indicated procedures Left 12/5/2018    Performed by [facility] at [facility] OR    EXAMINATION UNDER ANESTHESIA Left 12/5/2018    Procedure: Exam under anesthesia, left foot debridement, washout and all other indicated procedures;  Surgeon: Mitch Chan MD;  Location: [facility] OR;  Service: Vascular;  Laterality: Left;  1030AM START  RN PREOP 12/3/2018-----AICD  SEEN BY DR GREER 12/4    HEART CATH-LEFT N/A 5/6/2016    Performed by Mike Magana MD at [facility] CATH LAB    INCISION AND DRAINAGE Left 11/14/2018    Procedure: Incision and Drainage, left lower extremity debridement, washout;  Surgeon: Mitch Chan MD;  Location: [facility] OR;  Service: Vascular;  Laterality: Left;    INCISION AND DRAINAGE Left 11/16/2018    Procedure: INCISION AND DRAINAGE;  Surgeon: Mitch Chan MD;  Location: [facility] OR;  Service: Vascular;  Laterality: Left;    INCISION AND DRAINAGE Left 11/16/2018    Performed by Mitch Chan MD at [facility] OR    Incision and Drainage, left lower extremity debridement, washout Left 11/14/2018    Performed by Mitch Chan MD at [facility] OR    INSERTION, ICD GENERATOR, SINGLE CHAMBER N/A 11/2/2018    Performed by Pernell Greer MD at [facility] CATH LAB    INSERTION, IOL PROSTHESIS Right 9/26/2018    [facility]    INTRAOCULAR PROSTHESES INSERTION Right 9/26/2018    Procedure: INSERTION, IOL PROSTHESIS;  Surgeon: Perla Cortés MD;  Location: [facility] OR [address] [place] [address];  Service: Ophthalmology;  Laterality: Right;    PHACOEMULSIFICATION OF CATARACT Right 9/26/2018    Procedure: PHACOEMULSIFICATION, CATARACT;  Surgeon: Perla Cortés MD;  Location:  [facility] OR 1ST FLR;  Service: Ophthalmology;  Laterality: Right;    PHACOEMULSIFICATION, CATARACT Right 9/26/2018    Performed by Perla Cortés MD at [facility] OR 1ST FLR    Right foot surgery  10/2014    TOE AMPUTATION Right     first and second    TOE AMPUTATION Left 11/16/2018    Procedure: AMPUTATION, TOES  2-5;  Surgeon: Mitch Chan MD;  Location: [facility] OR;  Service: Vascular;  Laterality: Left;    TOE AMPUTATION Left 12/5/2018    Procedure: AMPUTATION, TOE;  Surgeon: Mitch Chan MD;  Location: [facility] OR;  Service: Vascular;  Laterality: Left;  left great toe    TONSILLECTOMY      TRABECULECTOMY/G 6 LASER Left 2/15/2017    Performed by Perla Cortés MD at [facility] OR 1ST FLR       Review of patient's allergies indicates:   Allergen Reactions    Statins-hmg-coa reductase inhibitors      Generalized Pain    Onglyza [saxagliptin]     Penicillins Rash       No current facility-administered medications on file prior to encounter.      Current Outpatient Medications on File Prior to Encounter   Medication Sig    allopurinol (ZYLOPRIM) 100 MG tablet Take 2 tablets (200 mg total) by mouth once daily.    amLODIPine (NORVASC) 5 MG tablet TAKE 1 TABLET(5 MG) BY MOUTH EVERY DAY    aspirin 81 MG Chew Take 81 mg by mouth once daily.    carvedilol (COREG) 6.25 MG tablet Take 1 tablet (6.25 mg total) by mouth 2 (two) times daily.    coenzyme Q10 100 mg capsule Take 100 mg by mouth every morning.    collagenase (SANTYL) ointment Apply topically once daily.    dorzolamide-timolol 2-0.5% (COSOPT) 22.3-6.8 mg/mL ophthalmic solution Place 1 drop into both eyes 3 (three) times daily.    ezetimibe (ZETIA) 10 mg tablet Take 1 tablet (10 mg total) by mouth once daily.    fish oil-omega-3 fatty acids 300-1,000 mg capsule Take 2 capsules (2 g total) by mouth 2 (two) times daily. (Patient taking differently: Take 1 g by mouth 2 (two) times daily. )    insulin glargine-lixisenatide (SOLIQUA 100/33) 100  "unit-33 mcg/mL InPn Inject 34 units once daily    ketorolac 0.5% (ACULAR) 0.5 % Drop Place 1 drop into the right eye 3 (three) times daily.    MULTIVIT,THER IRON,CA,FA & MIN (MULTIVITAMIN) Tab Take 1 tablet by mouth every morning.     oxyCODONE-acetaminophen (PERCOCET) 5-325 mg per tablet Take 1 tablet by mouth every 6 (six) hours as needed for Pain.    pen needle, diabetic 31 gauge x 1/4" Ndle Use as directed     Family History     Problem Relation (Age of Onset)    Diabetes Mother    Heart disease Brother    No Known Problems Father, Sister, Maternal Aunt, Maternal Uncle, Paternal Aunt, Paternal Uncle, Maternal Grandmother, Maternal Grandfather, Paternal Grandmother, Paternal Grandfather        Tobacco Use    Smoking status: Former Smoker     Packs/day: 1.00     Years: 3.00     Pack years: 3.00     Types: Cigarettes     Last attempt to quit: 1984     Years since quittin.4    Smokeless tobacco: Never Used   Substance and Sexual Activity    Alcohol use: Yes     Alcohol/week: 0.6 oz     Types: 1 Cans of beer per week     Comment: Occasional    Drug use: No    Sexual activity: Not Currently     Review of Systems   Unable to perform ROS: intubated     Objective:     Vital Signs (Most Recent):  Temp: 97.9 °F (36.6 °C) (18 0800)  Pulse: 74 (18 0900)  Resp: 12 (18 0900)  BP: (!) 115/57 (18 0900)  SpO2: 97 % (18 0900) Vital Signs (24h Range):  Temp:  [97.9 °F (36.6 °C)-98.4 °F (36.9 °C)] 97.9 °F (36.6 °C)  Pulse:  [64-98] 74  Resp:  [4-33] 12  SpO2:  [87 %-100 %] 97 %  BP: ()/(43-75) 115/57  Arterial Line BP: (100-137)/(40-58) 102/40     Weight: 109.8 kg (242 lb)  Body mass index is 34.72 kg/m².    SpO2: 97 %  O2 Device (Oxygen Therapy): ventilator      Intake/Output Summary (Last 24 hours) at 2018 0937  Last data filed at 2018 0900  Gross per 24 hour   Intake 1240.43 ml   Output 3015 ml   Net -1774.57 ml       Lines/Drains/Airways     Peripherally Inserted " Central Catheter Line                 PICC Double Lumen 12/10/18 2241 right basilic 1 day          Drain                 Open Drain 11/14/18 1353 Left Foot Other (see comments) Other (see comments) 27 days         NG/OG Tube 12/11/18 1350 orogastric 7.5 Fr. Center mouth less than 1 day         Urethral Catheter 12/11/18 1351 Double-lumen 16 Fr. less than 1 day          Airway                 Airway - Non-Surgical 11/16/18 0815 Other (Comment) 26 days         Airway - Non-Surgical 12/11/18 1320 Endotracheal Tube less than 1 day          Arterial Line                 Arterial Line 12/11/18 1530 Left Radial less than 1 day                Physical Exam   Constitutional: He appears well-developed and well-nourished.   HENT:   Head: Normocephalic and atraumatic.   Eyes: No scleral icterus.   Neck: No JVD present. No thyromegaly present.   Cardiovascular: Normal rate, regular rhythm, S1 normal and S2 normal. Exam reveals distant heart sounds.   Pulmonary/Chest: Effort normal and breath sounds normal. No respiratory distress.   intub and sedated   Abdominal: Soft. He exhibits no distension.   Musculoskeletal: He exhibits no edema.   Neurological:   UTO   Skin: Skin is warm and dry.   Psychiatric:   UTO       Current Medications:   allopurinol  200 mg Oral Daily    aspirin  81 mg Oral Daily    brimonidine 0.1%  1 drop Both Eyes BID    chlorhexidine  15 mL Mouth/Throat BID    clopidogrel  75 mg Oral Daily    collagenase   Topical (Top) Daily    enoxaparin  40 mg Subcutaneous Daily    ezetimibe  10 mg Oral Daily    famotidine (PF)  20 mg Intravenous BID    furosemide  40 mg Intravenous Daily    meropenem (MERREM) IVPB  2 g Intravenous Q8H    miconazole NITRATE 2 %   Topical (Top) BID    omega 3-dha-epa-fish oil  1 capsule Oral BID    prednisoLONE acetate  1 drop Right Eye Daily    sodium chloride 0.9%  10 mL Intravenous Q6H      norepinephrine bitartrate-D5W 0.04 mcg/kg/min (12/12/18 0900)    propofol 39.927  mcg/kg/min (12/12/18 0900)     acetaminophen, dextrose 50%, dextrose 50%, glucagon (human recombinant), glucose, glucose, insulin aspart U-100, ondansetron, oxyCODONE-acetaminophen, ramelteon, Flushing PICC Protocol **AND** sodium chloride 0.9% **AND** sodium chloride 0.9%    Laboratory:  CBC:  Recent Labs   Lab 12/09/18  1905 12/10/18  1718 12/11/18  0604 12/11/18  1406 12/12/18  0226   WHITE BLOOD CELL COUNT 7.64 7.18 8.68 8.50 7.80   HEMOGLOBIN 9.0 L 9.2 L 8.5 L 8.6 L 9.1 L   HEMATOCRIT 27.6 L 28.2 L 26.6 L 26.2 L 27.6 L   PLATELETS 299 328 327 297 343       CHEMISTRIES:  Recent Labs   Lab 05/06/16  0548 05/07/16  0441 05/08/16  0606  12/03/18  1129 12/09/18  1905 12/10/18  1718 12/11/18  0604 12/11/18  1406 12/12/18  0226   GLUCOSE 247 H 123 H 107   < > 122 H 97 74 76 132 H 153 H   SODIUM 139 140 140   < > 138 139 139 139 137 139   POTASSIUM 5.0 4.6 4.5   < > 4.1 4.3 4.0 4.1 4.4 3.8   BUN BLD 23 H 19 13   < > 59 H 59 H 56 H 58 H 60 H 50 H   CREATININE 1.0 0.8 0.8   < > 1.1 1.0 1.1 1.2 1.4 1.1   EGFR IF AFRICAN AMERICAN >60.0 >60.0 >60.0   < > >60 >60 >60 >60 >60 >60   EGFR IF NON- >60.0 >60.0 >60.0   < > >60 >60 >60 >60 54 A >60   CALCIUM 9.4 9.0 9.1   < > 8.6 L 8.8 8.7 8.5 L 8.2 L 8.6 L   MAGNESIUM 1.8 1.8 1.6  --  1.8  --   --   --  1.8  --     < > = values in this interval not displayed.       CARDIAC BIOMARKERS:  Recent Labs   Lab 05/05/16  2351 05/06/16  0548 10/12/16  1227 12/11/18  1406 12/11/18  2024 12/12/18  0226   CPK  --   --  93  --   --   --    TROPONIN I <0.006 0.013  --  0.532 H 0.449 H 0.338 H       COAGS:  Recent Labs   Lab 10/14/18  1059 10/30/18  0955 11/08/18  1154 12/03/18  1130 12/10/18  1718   INR 1.2 1.1 1.1 1.2 1.1       LIPIDS/LFTS:  Recent Labs   Lab 01/08/16  0952  05/08/16  0606 10/12/16  1227 07/27/18  0820  10/30/18  0955  12/03/18  1129 12/09/18  1905 12/11/18  0604 12/11/18  1406 12/12/18  0226   CHOLESTEROL 201 H  --  168 174 151  --  107 L  --   --   --   --    --   --    TRIGLYCERIDES 136  --  97 80 83  --  44  --   --   --   --   --   --    HDL 33 L  --  28 L 35 L 29 L  --  27 L  --   --   --   --   --   --    LDL CHOLESTEROL 140.8  --  120.6 123.0 105.4  --  71.2  --   --   --   --   --   --    NON-HDL CHOLESTEROL 168  --  140 139 122  --  80  --   --   --   --   --   --    AST 30   < > 9 L 16 27   < >  --    < > 33 21 20 29 20   ALT 12   < > 9 L 15 28   < >  --    < > 28 18 16 21 19    < > = values in this interval not displayed.       BNP:  Recent Labs   Lab 05/05/16  2351 12/11/18  1406    H 2,503 H       TSH:        Free T4:        Diagnostic Results:  ECG (personally reviewed tracings):  12/11/18 1359 SR 78, PRWP, NSSTTW changes, similar to 11/9/18    Chest X-Ray (personally reviewed image(s)): 12/11/18  1. Interval development of an air density curvilinear airspace opacity subjacent to the right hemidiaphragm suggest interval development of pneumoperitoneum.  Surgical consult/evaluation is indicated.  2.  Interval placement of an endotracheal tube in good position.  Remaining lines and leads are stable.  No pneumothorax.  3.  Bilateral perihilar and bibasilar airspace opacities likely reflect atelectasis however, superimposed infection cannot be excluded.    LE venous US 12/11/18  No evidence of deep venous thrombosis in either lower extremity.    Echo: 12/11/18 (images pers rev, similar EF to report 10/16/18)  · Severely decreased left ventricular systolic function. The estimated ejection fraction is 20%  · Severe global hypokinetic wall motion. Cannot exclude RWMA.  · Septal wall has abnormal motion. Systolic and diastolic flattening of the interventricular septum consistent with right ventricle pressure and volume overload.  · Left ventricular diastolic dysfunction.  · Mild right ventricular enlargement.  · Mildly to moderately reduced right ventricular systolic function.  · Moderate tricuspid regurgitation.  · There is a large left pleural  effusion.    Stress Test: 10/17/18  · Abnormal myocardial perfusion study  · There is a moderate amount of infarct in the inferior wall(s).In the typical distribution of the RCA territory.  · Post Stress Ejection Fraction is 17 %    Cath: 5/6/16  B. Summary/Post-Operative Diagnosis    Three vessel coronary artery disease.    LV systolic and diastolic dysfunction.  D. Hemodynamic Results  LVEDP (Pre): 25 mmHg  LVEDP (Post): 30 mmHg  Ejection Fraction: 35%  E. Angiographic Results       Patient has a right dominant coronary artery.      - Left Main Coronary Artery:             The LM has luminal irregularities. There is BAKARI 3 flow.     - Left Anterior Descending Artery:             The proximal LAD has a 80% stenosis. There is BAKARI 3 flow.             The mid LAD has a 90% stenosis. There is BAKARI 3 flow. The remaining portion of the vessel is diffusely diseased.     - Left Circumflex Artery:             The mid LCX has a 60% stenosis. There is BAKARI 3 flow. The remaining portion of the vessel has luminal irregularities.     - Right Coronary Artery:             The proximal RCA has a 60% stenosis. There is BAKARI 3 flow.             The mid RCA has a 80% stenosis. There is BAKARI 3 flow. The remaining portion of the vessel has luminal irregularities.     - Posterior Descending Artery:             The proximal PDA has a 60% stenosis. There is BAKARI 3 flow. The remaining portion of the vessel has luminal irregularities.      Assessment and Plan:     * Cardiac arrest    Unclear precipitant  Not on tele when this occurred, but has ICD in place  No immediate premorbid anginal sxs  Currently in SR  [facility]. [employer] PPM interrogated 12/11/18, no tachy events.  Note made of episodic pacing at 40BPM, but unclear exact timing.  This does not appear to be an arrhythmic syncopal event/cardiac arrest based on these findings.  Case d/w Dr. Sotomayor, consider CTA r/o PE.     CAD (coronary artery disease)    Known MV CAD unamenable to CABG or PCI  (per notes from 2016)  Severe LV dysfxn noted  St. Topher ICD in place  Mild flat trop elev likely demand related in setting of severe CAD, unlikely ACS.     Chronic combined systolic and diastolic heart failure    As above     Essential hypertension    Cont med rx as BP will tolerate     HLD (hyperlipidemia)    Cont statin     DM type 2 with diabetic peripheral neuropathy    Per IM     Foot infection    Per IM/ID         VTE Risk Mitigation (From admission, onward)        Ordered     enoxaparin injection 40 mg  Daily      12/12/18 0820     IP VTE HIGH RISK PATIENT  Once      12/10/18 2126        Critical care time 40min    Ismael Romano MD  Cardiology  [facility] [place]

## 2018-12-12 NOTE — ASSESSMENT & PLAN NOTE
Precipitating is a bit unclear.  ICD interrogation does not reveal a primary arrhythmia.  Troponin curve is otherwise flat not suggestive of ischemia as etiology.  No pulmonary embolism or DVT found on investigation.  I suspect likely related to hypoxemia in the setting of right ventricular failure and use of sedating medications.  He is otherwise neurologically intact, not coma.     -- continue with supportive care and optimization of cardiac function  -- no role for targeted temperature management as he is not in a coma  -- hold sedation and re-evaluate neurological status today.

## 2018-12-12 NOTE — ASSESSMENT & PLAN NOTE
On 12/11  PEA? ICD interrogated with no shocks or abnormal rhythms.  Etiology unclear. No PE, no NSTEMI, no electrolyte abnormalities, no hypoxia, no toxins

## 2018-12-12 NOTE — ASSESSMENT & PLAN NOTE
Secondary to DM/PVD- s/p revascularization on 11/13/2018 including a left SFA, AT and peroneal angioplasty to improve perfusion to his left foot due to extensive left foot   Wound. Wound again debrided by vascular on November 29th and 12/5.  Cultures with Acinetobacter.  Continue IV Meropenem.

## 2018-12-12 NOTE — ASSESSMENT & PLAN NOTE
Intubated 12/11 post code  Remains intubated, sedated on propofol  Ascites and bilateral effusions cause decreased lung compliance-- paracentesis today

## 2018-12-12 NOTE — PROGRESS NOTES
Pt received orally intubated with a size 7.5 ET tube, Secured at 24 cm,, on Servo I ventilator, on documented settings. Alarms are set and functional, Ambu bag and mask at the bedside. Pt tolerates well, RT will continue to monitor.

## 2018-12-12 NOTE — CONSULTS
"  [facility]  Adult Nutrition  Consult Note    SUMMARY     Recommendations    1. TF initiation of Peptamen Intense VHP @ 10ml/hr; adv as angel'd to a goal rate of 55ml/hr to provide 1320 cals (2017 cals w/ propofol), 121 gms prot, & 1109 mls free fl     2. Otherwise, adv diet as angel'd s/p extubation to Cardiac   Goals: TF initiation or diet advancement w/in 24 hrs  Nutrition Goal Status: new  Communication of JAVY Recs: reviewed with physician    Reason for Assessment    Reason For Assessment: consult  Diagnosis: cardiac disease  Relevant Medical History: CKD, CAD, DMII, HLD, HTN, PVD  Interdisciplinary Rounds: attended  General Information Comments: Pt admitted yesterday s/p cardiac arrest while on OBS uint. Pt seen this a.m. Intubated, sedated, on pressor support. Daughter at bedside & reports her dad has declined from a nutr standpoint over the past few months. Wt has increased 2/2 fluid status/edema. Pt w/ extensive heart hx, including low EF. NFPE performed today & pt w/ moderate muscle wasting/fat loss. See malnutrition section for details. Pt to have paracentesis today. Likely will be extubated tmrw.    Nutrition Discharge Planning: Unable to determine @ this time.    Nutrition Risk Screen    Nutrition Risk Screen: large or nonhealing wound, burn or pressure injury    Nutrition/Diet History    Patient Reported Diet/Restrictions/Preferences: diabetic diet, heart healthy(per daughter's report)  Spiritual, Cultural Beliefs, Islam Practices, Values that Affect Care: no  Factors Affecting Nutritional Intake: on mechanical ventilation    Anthropometrics    Temp: 97.9 °F (36.6 °C)  Height Method: Stated  Height: 5' 10" (177.8 cm)  Height (inches): 70 in  Weight Method: Bed Scale  Weight: 109.8 kg (242 lb)  Weight (lb): 242 lb  Ideal Body Weight (IBW), Male: 166 lb  % Ideal Body Weight, Male (lb): 145.78 lb  BMI (Calculated): 34.8  Weight Loss: other (see comments)(wt gain pta 2/2 fluid)   "     Lab/Procedures/Meds    Pertinent Labs Reviewed: reviewed  Pertinent Labs Comments: BUN 50, Glu 153, Alb 2.1, A1C 8.3, POCT glu 161  Pertinent Medications Reviewed: reviewed  Pertinent Medications Comments: levo, propofol, famotidine, lasix, omega 3's    Estimated/Assessed Needs    Weight Used For Calorie Calculations: 109.8 kg (242 lb 1 oz)  Energy Calorie Requirements (kcal): 2050  Energy Need Method: [facility] (modified)     Protein Requirements: 129-151 gms (1.7- 2 gms/kg IBW)   Weight Used For Protein Calculations: 75.5 kg (166 lb 7.2 oz)(IBW)     Fluid Requirements: 1ml/kcal or per MD  RDA Method (mL): 2050  Estimated Fiber Requirements: 25-30 gms/day      Nutrition Prescription Ordered    Current Diet Order: NPO    Evaluation of Received Nutrient/Fluid Intake    Other Calories (kcal): 697(cals via propofol)  I/O: reviewed  Energy Calories Required: not meeting needs  Protein Required: not meeting needs  Fluid Required: other (see comments)(per MD)  Comments: LBM 12/10; good uop  % Intake of Estimated Energy Needs: 25 - 50 %  % Meal Intake: NPO    Nutrition Risk    Level of Risk/Frequency of Follow-up: (F/u 2 x weekly)     Assessment and Plan    Severe malnutrition    Malnutrition in the context of Acute Illness/Injury    Related to (etiology):  Cardiac hx; s/p recent cardiac arrest    Signs and Symptoms (as evidenced by):  Body Fat Depletion: moderate depletion of orbitals and triceps   Muscle Mass Depletion: moderate depletion of temples, clavicle region, interosseous muscle and lower extremities   Fluid Accumulation: moderate    Interventions:  Collaboration w/ other providers    Nutrition Diagnosis Status:  New              Monitor and Evaluation    Food and Nutrient Intake: energy intake  Food and Nutrient Adminstration: diet order, enteral and parenteral nutrition administration  Anthropometric Measurements: weight change, weight  Biochemical Data, Medical Tests and Procedures: electrolyte and renal  panel, glucose/endocrine profile, inflammatory profile  Nutrition-Focused Physical Findings: overall appearance     Malnutrition Assessment  Malnutrition Type: acute illness or injury  Skin (Micronutrient): wounds unhealed           Orbital Region (Subcutaneous Fat Loss): moderate depletion  Upper Arm Region (Subcutaneous Fat Loss): mild depletion   Samaritan Region (Muscle Loss): moderate depletion  Clavicle Bone Region (Muscle Loss): moderate depletion  Clavicle and Acromion Bone Region (Muscle Loss): moderate depletion  Dorsal Hand (Muscle Loss): mild depletion  Anterior Thigh Region (Muscle Loss): mild depletion   Edema (Fluid Accumulation): 3-->moderate   Subcutaneous Fat Loss (Final Summary): severe protein-calorie malnutrition  Muscle Loss Evaluation (Final Summary): severe protein-calorie malnutrition         Nutrition Follow-Up  Yes

## 2018-12-12 NOTE — PROGRESS NOTES
Pt returned to room 275 from transport. Pt placed back on ventilator on previous settings. Ventilator and alarms on and functioning. Ambu bag and mask at bedside. Nurse notified.

## 2018-12-12 NOTE — SUBJECTIVE & OBJECTIVE
Interval History: Intubated, sedated.     Review of Systems   Unable to perform ROS: Intubated     Objective:     Vital Signs (Most Recent):  Temp: 97.9 °F (36.6 °C) (12/12/18 1200)  Pulse: 72 (12/12/18 1315)  Resp: 20 (12/12/18 1315)  BP: 117/62 (12/12/18 1000)  SpO2: 96 % (12/12/18 1315) Vital Signs (24h Range):  Temp:  [97.9 °F (36.6 °C)-98.4 °F (36.9 °C)] 97.9 °F (36.6 °C)  Pulse:  [64-87] 72  Resp:  [4-33] 20  SpO2:  [87 %-100 %] 96 %  BP: ()/(43-75) 117/62  Arterial Line BP: (100-137)/(39-58) 121/49     Weight: 109.8 kg (242 lb)  Body mass index is 34.72 kg/m².    Intake/Output Summary (Last 24 hours) at 12/12/2018 1336  Last data filed at 12/12/2018 1300  Gross per 24 hour   Intake 1512.03 ml   Output 4715 ml   Net -3202.97 ml      Physical Exam   Constitutional: He appears well-developed and well-nourished. No distress.   HENT:   Head: Normocephalic and atraumatic.   Mouth/Throat: Oropharynx is clear and moist.   ETT and OGT in place   Cardiovascular: Normal rate, regular rhythm, normal heart sounds and intact distal pulses. Exam reveals no gallop and no friction rub.   No murmur heard.  Pulmonary/Chest: Effort normal and breath sounds normal. No stridor. No respiratory distress. He has no wheezes. He has no rales.   Mechanical ventilation   Abdominal: Bowel sounds are normal. He exhibits distension. He exhibits no mass. There is no tenderness. There is no guarding.   Musculoskeletal: He exhibits edema.   Neurological:   Sedated with propofol, awakens and turns head easily   Skin: He is not diaphoretic.   Bilateral feet dressed   Nursing note and vitals reviewed.      Significant Labs: All pertinent labs within the past 24 hours have been reviewed.    Significant Imaging: I have reviewed and interpreted all pertinent imaging results/findings within the past 24 hours.

## 2018-12-12 NOTE — PROGRESS NOTES
Pt received on Servo I vent with settings as followed: PRVC 16/440/+8 and 40%. Size 7.5 ETT in place and secure at 24 cm at the lip. Ambu bag and mask at bedside and all alarms on and functioning. NARN. RT will continue to monitor patient status.

## 2018-12-12 NOTE — ASSESSMENT & PLAN NOTE
Last HgbA1c   Lab Results   Component Value Date    HGBA1C 8.3 (H) 10/30/2018   Hold oral antihyperglycemics while inpatient    Glucose controlled on SSI alone  NPO, Q6 accuchecks, SSI

## 2018-12-12 NOTE — ASSESSMENT & PLAN NOTE
No evidence to suggest hepatic dysfunction.  Previous paracentesis demonstrated SAAG=0.9, which is not consistent with portal hypertension or ascites secondary to cardiac dysfunction.  His albumin is grossly low.  Doppler evaluation of liver is unremarkable. A suspect the ascites is multifactorial related to hypoalbuminemia, gross volume overload..  Despite the low SAGG, I do suspect there is some contribution from his right ventricular dysfunction    -- large volume paracentesis today  -- repeat SAAG  -- check total protein  --cell count differential to exclude SBP

## 2018-12-12 NOTE — SUBJECTIVE & OBJECTIVE
Interval History:     Patient seen and examined this a.m..  No acute events in the last 24 hr noted. ICD has been interrogated and no evidence of arrhythmia as etiology for precipitant of rest.  Vasopressor support it is down trending.  Weaning supplemental O2.    Review of Systems   Unable to perform ROS: Intubated     Objective:     Vital Signs (Most Recent):  Temp: 97.9 °F (36.6 °C) (12/12/18 0800)  Pulse: 77 (12/12/18 1145)  Resp: 19 (12/12/18 1145)  BP: 117/62 (12/12/18 1000)  SpO2: 95 % (12/12/18 1145) Vital Signs (24h Range):  Temp:  [97.9 °F (36.6 °C)-98.4 °F (36.9 °C)] 97.9 °F (36.6 °C)  Pulse:  [64-87] 77  Resp:  [4-33] 19  SpO2:  [87 %-100 %] 95 %  BP: ()/(43-75) 117/62  Arterial Line BP: (100-137)/(39-58) 129/52     Weight: 109.8 kg (242 lb)  Body mass index is 34.72 kg/m².      Intake/Output Summary (Last 24 hours) at 12/12/2018 1200  Last data filed at 12/12/2018 1100  Gross per 24 hour   Intake 1426.23 ml   Output 4165 ml   Net -2738.77 ml     Fluid balance since admission- -2.4L    Physical Exam   Constitutional: He appears well-developed and well-nourished. He is intubated.   HENT:   Head: Normocephalic and atraumatic.   Mouth/Throat: Oropharynx is clear and moist.   Eyes: Conjunctivae are normal. Pupils are equal, round, and reactive to light. Right eye exhibits no discharge. Left eye exhibits no discharge. No scleral icterus.   Neck: Trachea normal, normal range of motion and full passive range of motion without pain. Neck supple. No JVD present. No tracheal deviation present. No thyromegaly present.   Cardiovascular: Normal rate, regular rhythm, S1 normal, S2 normal, normal heart sounds and intact distal pulses. Exam reveals no gallop and no friction rub.   No murmur heard.  Pulmonary/Chest: Breath sounds normal. He is intubated. No respiratory distress. He has no wheezes. He has no rales. He exhibits no tenderness.   Abdominal: Soft. Bowel sounds are normal. He exhibits distension. He  exhibits no mass. There is no tenderness. There is no rebound and no guarding.   Musculoskeletal: Normal range of motion. He exhibits no tenderness or deformity.   Lymphadenopathy:     He has no cervical adenopathy.   Neurological: No cranial nerve deficit. Coordination normal.   Skin: Skin is warm and dry. No abrasion and no bruising noted.   Psychiatric: He has a normal mood and affect.   Vitals reviewed.      Vents:  Vent Mode: PRVC (12/12/18 1130)  Ventilator Initiated: Yes (12/11/18 1413)  Set Rate: 16 bmp (12/12/18 1130)  Vt Set: 440 mL (12/12/18 1130)  PEEP/CPAP: 8 cmH20 (12/12/18 1130)  Oxygen Concentration (%): 40 (12/12/18 1145)  Peak Airway Pressure: 29.3 cmH2O (12/12/18 1130)  Total Ve: 10.6 mL (12/12/18 1130)  F/VT Ratio<105 (RSBI): (!) 57.83 (12/12/18 1130)        Significant Labs:    CBC/Anemia Profile:  Recent Labs   Lab 12/11/18  0604 12/11/18  1406 12/12/18  0226   WBC 8.68 8.50 7.80   HGB 8.5* 8.6* 9.1*   HCT 26.6* 26.2* 27.6*    297 343   MCV 95 95 92   RDW 16.9* 16.8* 16.6*        Chemistries:  Recent Labs   Lab 12/11/18  0604 12/11/18  1406 12/12/18  0226    137 139   K 4.1 4.4 3.8    106 107   CO2 26 21* 25   BUN 58* 60* 50*   CREATININE 1.2 1.4 1.1   CALCIUM 8.5* 8.2* 8.6*   ALBUMIN 2.1* 2.0* 2.1*   PROT 6.8 6.6 6.8   BILITOT 0.5 0.5 0.6   ALKPHOS 128 133 141*   ALT 16 21 19   AST 20 29 20   MG  --  1.8  --    PHOS  --  5.6*  --      Microbiology-     Blood culture- no growth to date  Wound culture (surgical specimen)- (12/5)  Aerobic Culture - Tissue ACINETOBACTER BAUMANNII/HAEMOLYTICUS   Few       Gram Stain Result Few WBC's    Gram Stain Result Few Gram negative rods    Resulting Agency [facility]   Susceptibility      Acinetobacter baumannii/haemolyticus     CULTURE, TISSUE     Amikacin <=16  Sensitive     Amp/Sulbactam 16/8  Intermediate     Cefepime 16  Intermediate     Ceftriaxone 32  Intermediate     Ciprofloxacin >2  Resistant     Gentamicin 8  Intermediate     Meropenem  <=4  Sensitive     Tetracycline 8  Intermediate     Tobramycin <=4  Sensitive     Trimeth/Sulfa >2/38  Resistant             Significant Imaging:   I have reviewed and interpreted all pertinent imaging results/findings within the past 24 hours.     CT chest-   No evidence of pulmonary thromboembolism.  Bilateral pleural effusions with moderate amount of adjacent atelectasis/consolidation of the lower lobes of the lungs bilaterally.  Moderate volume of ascites  Pulmonary parenchyma is unremarkable without evidence to suggest underlying ILD- there is some consolidative opacities in the bilateral lower lung zones which is consistent with likely compressive atelectasis from his pleural effusions and abdominal distention.    Lower extremity ultrasound- no evidence of deep vein thrombosis in either lower extremity.    CT abdomen and pelvis-    1. No findings to suggest pneumoperitoneum as clinically questioned.  2. Bilateral pleural effusions with associated adjacent compressive atelectasis versus consolidation.  Developing infection not excluded.  3. Moderate abdominopelvic ascites and associated body wall anasarca.  4. Moderate stool in the rectum       Echocardiogram    · Severely decreased left ventricular systolic function. The estimated ejection fraction is 20%  · Severe global hypokinetic wall motion. Cannot exclude RWMA.  · Septal wall has abnormal motion. Systolic and diastolic flattening of the interventricular septum consistent with right ventricle pressure and volume overload.  · Left ventricular diastolic dysfunction.  · Mild right ventricular enlargement.  · Mildly to moderately reduced right ventricular systolic function.  · Moderate tricuspid regurgitation.  · There is a large left pleural effusion.      PFT- none on file

## 2018-12-12 NOTE — ASSESSMENT & PLAN NOTE
Unclear precipitant  Not on tele when this occurred, but has ICD in place  No immediate premorbid anginal sxs  Currently in SR  [facility] interrogated 12/11/18, no tachy events.  Note made of episodic pacing at 40BPM, but unclear exact timing.  This does not appear to be an arrhythmic syncopal event/cardiac arrest based on these findings.  Case d/w Dr. Sotomayor, consider CTA r/o PE.

## 2018-12-12 NOTE — ASSESSMENT & PLAN NOTE
Abdominal US with ascites  Previous paracentesis in 10/2018 with SAAG 0.9, not consistent with portal hypertension  Repeat paracentesis today

## 2018-12-12 NOTE — HOSPITAL COURSE
12/11/18: admitted to ICU after cardiac arrest  12/13: no acute events, low dose levo  12/14:  Off Levophed

## 2018-12-12 NOTE — ASSESSMENT & PLAN NOTE
Troponin elevated to 0.5 max with new TWIs  Initially started NSTEMI protocol on 12/11 with concern for possible NSTEMI as cause of cardiac arrest  Troponins downtrended  Discontinued weight based lovenox on 12/12 after discussion with Cardiology

## 2018-12-12 NOTE — SUBJECTIVE & OBJECTIVE
Past Medical History:   Diagnosis Date    Arthritis     Cellulitis     CKD (chronic kidney disease), stage III     Coronary artery disease     Diabetes mellitus     Diabetic retinopathy     Diabetic ulcer of left foot     Glaucoma     Gout     Hyperlipemia     Hypertension     ICD (implantable cardioverter-defibrillator) in place 11/02/2018    Left chest    Non-pressure chronic ulcer of other part of left foot with fat layer exposed 10/23/2018    PVD (peripheral vascular disease)     Type 2 diabetes mellitus with left diabetic foot ulcer 10/29/2018    Unsteady gait     uses a wheelchair       Past Surgical History:   Procedure Laterality Date    AHMED GLAUCOMA IMPLANT Left 2011    DONE AT [facility]    AMPUTATION, TOE Left 12/5/2018    Performed by Mitch Chan MD at [facility] OR    AMPUTATION, TOES  2-5 Left 11/16/2018    Performed by Mitch Chan MD at [facility] OR    BAERVELDT GLAUCOMA IMPLANT Left 2012    WITH CATARACT EXTRACTION//DONE AT [facility]    CARDIAC CATHETERIZATION Left 05/2016    CARDIAC DEFIBRILLATOR PLACEMENT Left 11/02/2018    CATARACT EXTRACTION W/  INTRAOCULAR LENS IMPLANT Left 2012    WITH BAERVEDT//DONE AT [facility]    CATARACT EXTRACTION W/  INTRAOCULAR LENS IMPLANT Right 09/26/2018    COMPLEX ()    CB DESTRUCTION WITH CYCLO G6 Left 02/15/2017        CYST REMOVAL      Exam under anesthesia, left foot debridement, washout and all other indicated procedures Left 12/5/2018    Performed by Mitch Chan MD at [facility] OR    EXAMINATION UNDER ANESTHESIA Left 12/5/2018    Procedure: Exam under anesthesia, left foot debridement, washout and all other indicated procedures;  Surgeon: Mitch Chan MD;  Location: [facility] OR;  Service: Vascular;  Laterality: Left;  1030AM START  RN PREOP 12/3/2018-----AICD  SEEN BY DR JAMES 12/4    HEART CATH-LEFT N/A 5/6/2016    Performed by Mike [facility]ana [facility] at [facility] CATH LAB    INCISION AND DRAINAGE Left  11/14/2018    Procedure: Incision and Drainage, left lower extremity debridement, washout;  Surgeon: Mitch Chan MD;  Location: [facility] OR;  Service: Vascular;  Laterality: Left;    INCISION AND DRAINAGE Left 11/16/2018    Procedure: INCISION AND DRAINAGE;  Surgeon: Mitch Chan MD;  Location: [facility] OR;  Service: Vascular;  Laterality: Left;    INCISION AND DRAINAGE Left 11/16/2018    Performed by Mitch Chan MD at [facility] OR    Incision and Drainage, left lower extremity debridement, washout Left 11/14/2018    Performed by Mitch Chan MD at [facility] OR    INSERTION, ICD GENERATOR, SINGLE CHAMBER N/A 11/2/2018    Performed by Pernell Greer MD at [facility] CATH LAB    INSERTION, IOL PROSTHESIS Right 9/26/2018    Performed by Perla Cortés MD at [facility] OR [address] [place] [address]    INTRAOCULAR PROSTHESES INSERTION Right 9/26/2018    Procedure: INSERTION, IOL PROSTHESIS;  Surgeon: Perla Cortés MD;  Location: [facility] OR [address] [place] [address];  Service: Ophthalmology;  Laterality: Right;    PHACOEMULSIFICATION OF CATARACT Right 9/26/2018    Procedure: PHACOEMULSIFICATION, CATARACT;  Surgeon: Perla Cortés MD;  Location: [facility] OR [address] [place] [address];  Service: Ophthalmology;  Laterality: Right;    PHACOEMULSIFICATION, CATARACT Right 9/26/2018    Performed by Perla Cortés MD at [facility] OR [address] [place] [address]    Right foot surgery  10/2014    TOE AMPUTATION Right     first and second    TOE AMPUTATION Left 11/16/2018    Procedure: AMPUTATION, TOES  2-5;  Surgeon: Mitch Chan MD;  Location: [facility] OR;  Service: Vascular;  Laterality: Left;    TOE AMPUTATION Left 12/5/2018    Procedure: AMPUTATION, TOE;  Surgeon: Mitch Chan MD;  Location: [facility] OR;  Service: Vascular;  Laterality: Left;  left great toe    TONSILLECTOMY      TRABECULECTOMY/G 6 LASER Left 2/15/2017    Performed by Perla Cortés MD at [facility] OR [address] [place] [address]       Review of patient's allergies indicates:   Allergen Reactions     "Statins-hmg-coa reductase inhibitors      Generalized Pain    Onglyza [saxagliptin]     Penicillins Rash       No current facility-administered medications on file prior to encounter.      Current Outpatient Medications on File Prior to Encounter   Medication Sig    allopurinol (ZYLOPRIM) 100 MG tablet Take 2 tablets (200 mg total) by mouth once daily.    amLODIPine (NORVASC) 5 MG tablet TAKE 1 TABLET(5 MG) BY MOUTH EVERY DAY    aspirin 81 MG Chew Take 81 mg by mouth once daily.    carvedilol (COREG) 6.25 MG tablet Take 1 tablet (6.25 mg total) by mouth 2 (two) times daily.    coenzyme Q10 100 mg capsule Take 100 mg by mouth every morning.    collagenase (SANTYL) ointment Apply topically once daily.    dorzolamide-timolol 2-0.5% (COSOPT) 22.3-6.8 mg/mL ophthalmic solution Place 1 drop into both eyes 3 (three) times daily.    ezetimibe (ZETIA) 10 mg tablet Take 1 tablet (10 mg total) by mouth once daily.    fish oil-omega-3 fatty acids 300-1,000 mg capsule Take 2 capsules (2 g total) by mouth 2 (two) times daily. (Patient taking differently: Take 1 g by mouth 2 (two) times daily. )    insulin glargine-lixisenatide (SOLIQUA 100/33) 100 unit-33 mcg/mL InPn Inject 34 units once daily    ketorolac 0.5% (ACULAR) 0.5 % Drop Place 1 drop into the right eye 3 (three) times daily.    MULTIVIT,THER IRON,CA,FA & MIN (MULTIVITAMIN) Tab Take 1 tablet by mouth every morning.     oxyCODONE-acetaminophen (PERCOCET) 5-325 mg per tablet Take 1 tablet by mouth every 6 (six) hours as needed for Pain.    pen needle, diabetic 31 gauge x 1/4" Ndle Use as directed     Family History     Problem Relation (Age of Onset)    Diabetes Mother    Heart disease Brother    No Known Problems Father, Sister, Maternal Aunt, Maternal Uncle, Paternal Aunt, Paternal Uncle, Maternal Grandmother, Maternal Grandfather, Paternal Grandmother, Paternal Grandfather        Tobacco Use    Smoking status: Former Smoker     Packs/day: 1.00     " Years: 3.00     Pack years: 3.00     Types: Cigarettes     Last attempt to quit: 1984     Years since quittin.4    Smokeless tobacco: Never Used   Substance and Sexual Activity    Alcohol use: Yes     Alcohol/week: 0.6 oz     Types: 1 Cans of beer per week     Comment: Occasional    Drug use: No    Sexual activity: Not Currently     Review of Systems   Unable to perform ROS: intubated     Objective:     Vital Signs (Most Recent):  Temp: 97.9 °F (36.6 °C) (18 0800)  Pulse: 74 (18 0900)  Resp: 12 (18 09)  BP: (!) 115/57 (18 09)  SpO2: 97 % (18) Vital Signs (24h Range):  Temp:  [97.9 °F (36.6 °C)-98.4 °F (36.9 °C)] 97.9 °F (36.6 °C)  Pulse:  [64-98] 74  Resp:  [4-33] 12  SpO2:  [87 %-100 %] 97 %  BP: ()/(43-75) 115/57  Arterial Line BP: (100-137)/(40-58) 102/40     Weight: 109.8 kg (242 lb)  Body mass index is 34.72 kg/m².    SpO2: 97 %  O2 Device (Oxygen Therapy): ventilator      Intake/Output Summary (Last 24 hours) at 2018 0937  Last data filed at 2018 0900  Gross per 24 hour   Intake 1240.43 ml   Output 3015 ml   Net -1774.57 ml       Lines/Drains/Airways     Peripherally Inserted Central Catheter Line                 PICC Double Lumen 12/10/18 2241 right basilic 1 day          Drain                 Open Drain 18 1353 Left Foot Other (see comments) Other (see comments) 27 days         NG/OG Tube 18 1350 orogastric 7.5 Fr. Center mouth less than 1 day         Urethral Catheter 18 1351 Double-lumen 16 Fr. less than 1 day          Airway                 Airway - Non-Surgical 18 0815 Other (Comment) 26 days         Airway - Non-Surgical 18 1320 Endotracheal Tube less than 1 day          Arterial Line                 Arterial Line 18 1530 Left Radial less than 1 day                Physical Exam   Constitutional: He appears well-developed and well-nourished.   HENT:   Head: Normocephalic and atraumatic.   Eyes: No  scleral icterus.   Neck: No JVD present. No thyromegaly present.   Cardiovascular: Normal rate, regular rhythm, S1 normal and S2 normal. Exam reveals distant heart sounds.   Pulmonary/Chest: Effort normal and breath sounds normal. No respiratory distress.   intub and sedated   Abdominal: Soft. He exhibits no distension.   Musculoskeletal: He exhibits no edema.   Neurological:   UTO   Skin: Skin is warm and dry.   Psychiatric:   UTO       Current Medications:   allopurinol  200 mg Oral Daily    aspirin  81 mg Oral Daily    brimonidine 0.1%  1 drop Both Eyes BID    chlorhexidine  15 mL Mouth/Throat BID    clopidogrel  75 mg Oral Daily    collagenase   Topical (Top) Daily    enoxaparin  40 mg Subcutaneous Daily    ezetimibe  10 mg Oral Daily    famotidine (PF)  20 mg Intravenous BID    furosemide  40 mg Intravenous Daily    meropenem (MERREM) IVPB  2 g Intravenous Q8H    miconazole NITRATE 2 %   Topical (Top) BID    omega 3-dha-epa-fish oil  1 capsule Oral BID    prednisoLONE acetate  1 drop Right Eye Daily    sodium chloride 0.9%  10 mL Intravenous Q6H      norepinephrine bitartrate-D5W 0.04 mcg/kg/min (12/12/18 0900)    propofol 39.927 mcg/kg/min (12/12/18 0900)     acetaminophen, dextrose 50%, dextrose 50%, glucagon (human recombinant), glucose, glucose, insulin aspart U-100, ondansetron, oxyCODONE-acetaminophen, ramelteon, Flushing PICC Protocol **AND** sodium chloride 0.9% **AND** sodium chloride 0.9%    Laboratory:  CBC:  Recent Labs   Lab 12/09/18  1905 12/10/18  1718 12/11/18  0604 12/11/18  1406 12/12/18  0226   WHITE BLOOD CELL COUNT 7.64 7.18 8.68 8.50 7.80   HEMOGLOBIN 9.0 L 9.2 L 8.5 L 8.6 L 9.1 L   HEMATOCRIT 27.6 L 28.2 L 26.6 L 26.2 L 27.6 L   PLATELETS 299 328 327 297 343       CHEMISTRIES:  Recent Labs   Lab 05/06/16  0548 05/07/16  0441 05/08/16  0606  12/03/18  1129 12/09/18  1905 12/10/18  1718 12/11/18  0604 12/11/18  1406 12/12/18  0226   GLUCOSE 247 H 123 H 107   < > 122 H 97 74  76 132 H 153 H   SODIUM 139 140 140   < > 138 139 139 139 137 139   POTASSIUM 5.0 4.6 4.5   < > 4.1 4.3 4.0 4.1 4.4 3.8   BUN BLD 23 H 19 13   < > 59 H 59 H 56 H 58 H 60 H 50 H   CREATININE 1.0 0.8 0.8   < > 1.1 1.0 1.1 1.2 1.4 1.1   EGFR IF AFRICAN AMERICAN >60.0 >60.0 >60.0   < > >60 >60 >60 >60 >60 >60   EGFR IF NON- >60.0 >60.0 >60.0   < > >60 >60 >60 >60 54 A >60   CALCIUM 9.4 9.0 9.1   < > 8.6 L 8.8 8.7 8.5 L 8.2 L 8.6 L   MAGNESIUM 1.8 1.8 1.6  --  1.8  --   --   --  1.8  --     < > = values in this interval not displayed.       CARDIAC BIOMARKERS:  Recent Labs   Lab 05/05/16  2351 05/06/16  0548 10/12/16  1227 12/11/18  1406 12/11/18  2024 12/12/18  0226   CPK  --   --  93  --   --   --    TROPONIN I <0.006 0.013  --  0.532 H 0.449 H 0.338 H       COAGS:  Recent Labs   Lab 10/14/18  1059 10/30/18  0955 11/08/18  1154 12/03/18  1130 12/10/18  1718   INR 1.2 1.1 1.1 1.2 1.1       LIPIDS/LFTS:  Recent Labs   Lab 01/08/16  0952  05/08/16  0606 10/12/16  1227 07/27/18  0820  10/30/18  0955  12/03/18  1129 12/09/18  1905 12/11/18  0604 12/11/18  1406 12/12/18  0226   CHOLESTEROL 201 H  --  168 174 151  --  107 L  --   --   --   --   --   --    TRIGLYCERIDES 136  --  97 80 83  --  44  --   --   --   --   --   --    HDL 33 L  --  28 L 35 L 29 L  --  27 L  --   --   --   --   --   --    LDL CHOLESTEROL 140.8  --  120.6 123.0 105.4  --  71.2  --   --   --   --   --   --    NON-HDL CHOLESTEROL 168  --  140 139 122  --  80  --   --   --   --   --   --    AST 30   < > 9 L 16 27   < >  --    < > 33 21 20 29 20   ALT 12   < > 9 L 15 28   < >  --    < > 28 18 16 21 19    < > = values in this interval not displayed.       BNP:  Recent Labs   Lab 05/05/16  2351 12/11/18  1406    H 2,503 H       TSH:        Free T4:        Diagnostic Results:  ECG (personally reviewed tracings):  12/11/18 1359 SR 78, PRWP, NSSTTW changes, similar to 11/9/18    Chest X-Ray (personally reviewed image(s)): 12/11/18  1.  Interval development of an air density curvilinear airspace opacity subjacent to the right hemidiaphragm suggest interval development of pneumoperitoneum.  Surgical consult/evaluation is indicated.  2.  Interval placement of an endotracheal tube in good position.  Remaining lines and leads are stable.  No pneumothorax.  3.  Bilateral perihilar and bibasilar airspace opacities likely reflect atelectasis however, superimposed infection cannot be excluded.    LE venous US 12/11/18  No evidence of deep venous thrombosis in either lower extremity.    Echo: 12/11/18 (images pers rev, similar EF to report 10/16/18)  · Severely decreased left ventricular systolic function. The estimated ejection fraction is 20%  · Severe global hypokinetic wall motion. Cannot exclude RWMA.  · Septal wall has abnormal motion. Systolic and diastolic flattening of the interventricular septum consistent with right ventricle pressure and volume overload.  · Left ventricular diastolic dysfunction.  · Mild right ventricular enlargement.  · Mildly to moderately reduced right ventricular systolic function.  · Moderate tricuspid regurgitation.  · There is a large left pleural effusion.    Stress Test: 10/17/18  · Abnormal myocardial perfusion study  · There is a moderate amount of infarct in the inferior wall(s).In the typical distribution of the RCA territory.  · Post Stress Ejection Fraction is 17 %    Cath: 5/6/16  B. Summary/Post-Operative Diagnosis    Three vessel coronary artery disease.    LV systolic and diastolic dysfunction.  D. Hemodynamic Results  LVEDP (Pre): 25 mmHg  LVEDP (Post): 30 mmHg  Ejection Fraction: 35%  E. Angiographic Results       Patient has a right dominant coronary artery.      - Left Main Coronary Artery:             The LM has luminal irregularities. There is BAKARI 3 flow.     - Left Anterior Descending Artery:             The proximal LAD has a 80% stenosis. There is BAKARI 3 flow.             The mid LAD has a 90%  stenosis. There is BAKARI 3 flow. The remaining portion of the vessel is diffusely diseased.     - Left Circumflex Artery:             The mid LCX has a 60% stenosis. There is BAKARI 3 flow. The remaining portion of the vessel has luminal irregularities.     - Right Coronary Artery:             The proximal RCA has a 60% stenosis. There is BAKARI 3 flow.             The mid RCA has a 80% stenosis. There is BAKARI 3 flow. The remaining portion of the vessel has luminal irregularities.     - Posterior Descending Artery:             The proximal PDA has a 60% stenosis. There is BAKARI 3 flow. The remaining portion of the vessel has luminal irregularities.

## 2018-12-12 NOTE — HPI
58M with uncontrolled DM and vascular disease admitted with L foot wound necrosiss including underlying bone.  s/p revascularization on 11/13/2018 including a left SFA, AT and peroneal angioplasty. Wounds again debrided by vascular on 11/29 and 12/5.  when outpatient abx had nearly been arranged, he had a cardiac arrest and ROSC. Now intubated and sedated in ICU. History obtained by wife and chart review. apparantly he has been denying other symptoms such as fever, chills, dysuria. He has been having increased abdominal distention and s/p paracentesis today, results pending.     Most recent vascular surgery 12/5, underwent:  1.  Left foot wound debridement, 25 x 18 x 1 cm.  2.  Left foot wound washout, 25 x 18 x 1 cm.  3.  Left great toe amputation including metatarsal head.         Reviewed echo:   · Severely decreased left ventricular systolic function. The estimated ejection fraction is 20%  · Severe global hypokinetic wall motion. Cannot exclude RWMA.  · Septal wall has abnormal motion. Systolic and diastolic flattening of the interventricular septum consistent with right ventricle pressure and volume overload.  · Left ventricular diastolic dysfunction.  · Mild right ventricular enlargement.  · Mildly to moderately reduced right ventricular systolic function.  · Moderate tricuspid regurgitation.  · There is a large left pleural effusion    Per chart review, only bacteremia was strep on 10/10 and he appeared to have been treated with 7d of ceftriaxone then meropenem inpatient and got several more days of keflex on discharge.     Reviewed cultures:    Collected: 10/10/18 1247   Order Status: Completed Specimen: Blood from Peripheral, Antecubital, Left Updated: 10/13/18 0842    Blood Culture, Routine Gram stain yudelka bottle: Gram positive cocci in chains resembling Strep     Blood Culture, Routine Results called to and read back by: Chloé To    Blood Culture, Routine Gram stain aer bottle: Gram positive cocci in  chains resembling Strep     Blood Culture, Routine Positive results previously called 10/11/2018  18:47    Blood Culture, Routine --    GROUP G STREPTOCOCCUS   Beta-hemolytic streptococci are routinely susceptible to   penicillins,cephalosporins and carbapenems.    Susceptibility      Group g streptococcus     CULTURE, BLOOD     Amox/K Clav'ate <=0.5/.25       Ampicillin <=0.06  Sensitive     Azithromycin <=0.25  Sensitive     Cefepime <=0.25  Sensitive     Cefotaxime <=0.25  Sensitive     Ceftriaxone <=0.25  Sensitive     Chloramphenicol 4  Sensitive     Clindamycin <=0.06  Sensitive     Erythromycin <=0.06  Sensitive     Meropenem <=0.06       Penicillin <=0.03  Sensitive     Tetracycline <=0.5  Sensitive     Trimeth/Sulfa <=.25/4.7       Vancomycin 0.5  Sensitive            2nd - 5th toe left foot   Susceptibility      Acinetobacter baumannii/haemolyticus Enterococcus faecalis     CULTURE, AEROBIC  (SPECIFY SOURCE) CULTURE, AEROBIC  (SPECIFY SOURCE)     Amikacin <=16  Sensitive       Amp/Sulbactam >16/8  Resistant       Ampicillin   <=2  Sensitive     Cefepime <=8  Sensitive       Ceftriaxone 32  Intermediate       Ciprofloxacin <=1  Sensitive       Gentamicin <=4  Sensitive       Gentamicin Synergy Screen   <=500  Sensitive     Meropenem <=4  Sensitive       Tetracycline <=4  Sensitive <=4  Sensitive     Tobramycin <=4  Sensitive       Trimeth/Sulfa >2/38  Resistant       Vancomycin   2  Sensitive              Linear View         Fungus culture [[id number]] Collected: 11/16/18 0838   Order Status: Completed Specimen: Bone from Foot, Left Updated: 12/03/18 1359    Fungus (Mycology) Culture FUSARIUM SPECIES   Narrative:     2nd - 5th toe left foot     Narrative:     2nd -5th toe left foot   Susceptibility      Acinetobacter baumannii/haemolyticus     CULTURE, AEROBIC  (SPECIFY SOURCE)     Amikacin <=16  Sensitive     Amp/Sulbactam >16/8  Resistant     Cefepime >16  Resistant     Ceftriaxone >32  Resistant      Ciprofloxacin <=1  Sensitive     Gentamicin <=4  Sensitive     Meropenem <=4  Sensitive     Tetracycline <=4  Sensitive     Tobramycin <=4  Sensitive     Trimeth/Sulfa >2/38  Resistant             Acinetobacter baumannii/haemolyticus     CULTURE, TISSUE     Amikacin <=16  Sensitive     Amp/Sulbactam 16/8  Intermediate     Cefepime 16  Intermediate     Ceftriaxone 32  Intermediate     Ciprofloxacin >2  Resistant     Gentamicin 8  Intermediate     Meropenem <=4  Sensitive     Tetracycline 8  Intermediate     Tobramycin <=4  Sensitive     Trimeth/Sulfa >2/38  Resistant

## 2018-12-12 NOTE — ASSESSMENT & PLAN NOTE
No evidence of decompensation on admit  Post code BNP elevated, though this is expected  Patient has ascites and bilateral pleural effusions. Does not have significant pulmonary edema  Will change lasix to 40mg IV daily while intubated  Monitor UOP    TTE 12/11  · Severely decreased left ventricular systolic function. The estimated ejection fraction is 20%  · Severe global hypokinetic wall motion. Cannot exclude RWMA.  · Septal wall has abnormal motion. Systolic and diastolic flattening of the interventricular septum consistent with right ventricle pressure and volume overload.  · Left ventricular diastolic dysfunction.  · Mild right ventricular enlargement.  · Mildly to moderately reduced right ventricular systolic function.  · Moderate tricuspid regurgitation.  · There is a large left pleural effusion.

## 2018-12-12 NOTE — PLAN OF CARE
Problem: Adult Inpatient Plan of Care  Goal: Plan of Care Review  Outcome: Ongoing (interventions implemented as appropriate)  Patient is intubated and sedated resting in bed with side rails up 2.  Soft restraints to bilateral wrists.  Patient is responsive to pain and voice.  Propofol gtt and levophed infusing to L upper arm PICC.  Generalized edema pitting to all extremities.  Abdomen is distended and firm.  CT chest with contrast obtained today for PE rule out.  Paracentesis planned for this afternoon at bedside.  Plan of care reviewed with patient and family and addressed all questions and concerns.

## 2018-12-12 NOTE — PROGRESS NOTES
[facility] [place] [facility]  Progress Note    Patient Name: Marvin Ray  MRN: [id number]  Patient Class: IP- Inpatient   Admission Date: 12/10/2018  Length of Stay: 1 days  Attending Physician: Jazz Sotomayor MD  Primary Care Provider: Rubén Davis MD        Subjective:     Principal Problem:Cardiac arrest    HPI:  58 y.o. male with DM2, HLD, HTN, CAD, chronic combined heart failure, CKD stage III presents with a complaint of left foot wound infection.  Prior cultures show only sensitive to meropenem.  Outpatient IV antibiotics were attempted to be setup last night and throughout the day today through his PCP but were unable to be procured.  He denies fever, chills, cough, SOB, chest pain, palpitations, headaches, vision changes, N/V/D, abdominal pain, or dysuria.  Placed in observation for PICC line placement and to setup home IV abx.    Hospital Course:  Patient admitted for PICC line placement for IV antibiotics for left wound infection -12/5/18 wound culture Acinetobacter baumannii/haemolyticus sensitive to meropenem. IV meropenum started in ED. On 12/11/18, patient cardiac arrest with ROSC after ACLS. Per sister, he was standing getting ready to leave the hospital and then fell back into the bed. Patient intubated on 12/11 and transferred to ICU. Narcan given prior to transfer to ICU.   Sedated with propofol. Hypotensive after arrest requiring levophed that has been weaned. Cardiology and Pulmonary consulted. ICD interrogated- no events, no shocks. Possibly PEA arrest? TTE with EF 30%, diastolic dysfunction, new RV changes. CT negative for PE. Troponins elevated at 0.5 but downtrended. Initially started asa, plavix, and full dose lovenox with concern for NSTEMI, but unlikely NSTEMI given minimal troponin elevations that could be explained by chest compressions alone; lovenox discontinued. Will continue DAPT given severe CAD with no intervention (not a candidate for PCI or CABG given  anatomy). Blood cultures checked and NGTD. Wound care following for DM foot wounds. Abdominal US with ascites. Previously investigated in 10/2018 with SAAG 0.9. Will repeat paracentesis on 12/12.     Interval History: Intubated, sedated.     Review of Systems   Unable to perform ROS: Intubated     Objective:     Vital Signs (Most Recent):  Temp: 97.9 °F (36.6 °C) (12/12/18 1200)  Pulse: 72 (12/12/18 1315)  Resp: 20 (12/12/18 1315)  BP: 117/62 (12/12/18 1000)  SpO2: 96 % (12/12/18 1315) Vital Signs (24h Range):  Temp:  [97.9 °F (36.6 °C)-98.4 °F (36.9 °C)] 97.9 °F (36.6 °C)  Pulse:  [64-87] 72  Resp:  [4-33] 20  SpO2:  [87 %-100 %] 96 %  BP: ()/(43-75) 117/62  Arterial Line BP: (100-137)/(39-58) 121/49     Weight: 109.8 kg (242 lb)  Body mass index is 34.72 kg/m².    Intake/Output Summary (Last 24 hours) at 12/12/2018 1336  Last data filed at 12/12/2018 1300  Gross per 24 hour   Intake 1512.03 ml   Output 4715 ml   Net -3202.97 ml      Physical Exam   Constitutional: He appears well-developed and well-nourished. No distress.   HENT:   Head: Normocephalic and atraumatic.   Mouth/Throat: Oropharynx is clear and moist.   ETT and OGT in place   Cardiovascular: Normal rate, regular rhythm, normal heart sounds and intact distal pulses. Exam reveals no gallop and no friction rub.   No murmur heard.  Pulmonary/Chest: Effort normal and breath sounds normal. No stridor. No respiratory distress. He has no wheezes. He has no rales.   Mechanical ventilation   Abdominal: Bowel sounds are normal. He exhibits distension. He exhibits no mass. There is no tenderness. There is no guarding.   Musculoskeletal: He exhibits edema.   Neurological:   Sedated with propofol, awakens and turns head easily   Skin: He is not diaphoretic.   Bilateral feet dressed   Nursing note and vitals reviewed.      Significant Labs: All pertinent labs within the past 24 hours have been reviewed.    Significant Imaging: I have reviewed and interpreted  all pertinent imaging results/findings within the past 24 hours.    Assessment/Plan:      * Cardiac arrest    On 12/11  PEA? ICD interrogated with no shocks or abnormal rhythms.  Etiology unclear. No PE, no NSTEMI, no electrolyte abnormalities, no hypoxia, no toxins       Other ascites    Abdominal US with ascites  Previous paracentesis in 10/2018 with SAAG 0.9, not consistent with portal hypertension  Repeat paracentesis today       Elevated troponin    Troponin elevated to 0.5 max with new TWIs  Initially started NSTEMI protocol on 12/11 with concern for possible NSTEMI as cause of cardiac arrest  Troponins downtrended  Discontinued weight based lovenox on 12/12 after discussion with Cardiology       Shock    Hypotensive after code on 12/11  On levophed, requirements decreasing  Exact cause of shock unclear       Acute hypoxemic respiratory failure    Intubated 12/11 post code  Remains intubated, sedated on propofol  Ascites and bilateral effusions cause decreased lung compliance-- paracentesis today       Foot infection    Tissue culture obtained by Vascular Surgery obtained 12/5/18 grew Acinetobacter baumannii/haemolyticus sensitive to meropenem.    ID consulted  PICC in place R arm for outpatient antibiotics  Wound care following  ESR 72, CRP 89 on 12/11     Chronic combined systolic and diastolic heart failure    No evidence of decompensation on admit  Post code BNP elevated, though this is expected  Patient has ascites and bilateral pleural effusions. Does not have significant pulmonary edema  Will change lasix to 40mg IV daily while intubated  Monitor UOP    TTE 12/11  · Severely decreased left ventricular systolic function. The estimated ejection fraction is 20%  · Severe global hypokinetic wall motion. Cannot exclude RWMA.  · Septal wall has abnormal motion. Systolic and diastolic flattening of the interventricular septum consistent with right ventricle pressure and volume overload.  · Left ventricular  diastolic dysfunction.  · Mild right ventricular enlargement.  · Mildly to moderately reduced right ventricular systolic function.  · Moderate tricuspid regurgitation.  · There is a large left pleural effusion.     LAYNE (acute kidney injury)    Baseline Cr ~0.9  Cr increased to 1.4 post code, now improved to 1.1  Continue to monitor  Renal dose all meds, avoid nephrotoxins      PVD (peripheral vascular disease)    Continue asa, plavix, lipid reduction meds       CAD (coronary artery disease)    Patient has severe 3v disease per [facility] in 2016 with no targets for PCI or CABG  Continue asa. Add plavix (discussed with Juan Antonio). Continue lipid management. Hold BB and ACEi for hypotension  With elevated troponin- see elsewhere     Essential hypertension    Currently hypotensive on levophed  Hold all BP meds     Tophaceous gout    Continue home allopurinol       Neovascular glaucoma of both eyes    Continue eye drops       HLD (hyperlipidemia)    Last lipids 10/2018 are well controlled  Continue home regimen of ezetimibe and fish oil (statin intolerance)     DM type 2 with diabetic peripheral neuropathy    Last HgbA1c   Lab Results   Component Value Date    HGBA1C 8.3 (H) 10/30/2018   Hold oral antihyperglycemics while inpatient    Glucose controlled on SSI alone  NPO, Q6 accuchecks, SSI         VTE Risk Mitigation (From admission, onward)        Ordered     enoxaparin injection 40 mg  Daily      12/12/18 0820     IP VTE HIGH RISK PATIENT  Once      12/10/18 2126          Critical care time spent on the evaluation and treatment of severe organ dysfunction, review of pertinent labs and imaging studies, discussions with consulting providers and discussions with patient/family: 60 minutes.    Jazz Sotomayor MD  Department of Hospital Medicine   [facility]

## 2018-12-12 NOTE — PROGRESS NOTES
[facility]  Pulmonology  Progress Note    Patient Name: Marvin Ray  MRN: [id number]  Admission Date: 12/10/2018  Hospital Length of Stay: 1 days  Code Status: Full Code  Attending Provider: Jazz Sotomayor MD  Primary Care Provider: Rubén Davis MD   Principal Problem: Cardiac arrest    Subjective:       Interval History:     Patient seen and examined this a.m..  No acute events in the last 24 hr noted. ICD has been interrogated and no evidence of arrhythmia as etiology for precipitant of rest.  Vasopressor support it is down trending.  Weaning supplemental O2.    Review of Systems   Unable to perform ROS: Intubated     Objective:     Vital Signs (Most Recent):  Temp: 97.9 °F (36.6 °C) (12/12/18 0800)  Pulse: 77 (12/12/18 1145)  Resp: 19 (12/12/18 1145)  BP: 117/62 (12/12/18 1000)  SpO2: 95 % (12/12/18 1145) Vital Signs (24h Range):  Temp:  [97.9 °F (36.6 °C)-98.4 °F (36.9 °C)] 97.9 °F (36.6 °C)  Pulse:  [64-87] 77  Resp:  [4-33] 19  SpO2:  [87 %-100 %] 95 %  BP: ()/(43-75) 117/62  Arterial Line BP: (100-137)/(39-58) 129/52     Weight: 109.8 kg (242 lb)  Body mass index is 34.72 kg/m².      Intake/Output Summary (Last 24 hours) at 12/12/2018 1200  Last data filed at 12/12/2018 1100  Gross per 24 hour   Intake 1426.23 ml   Output 4165 ml   Net -2738.77 ml     Fluid balance since admission- -2.4L    Physical Exam   Constitutional: He appears well-developed and well-nourished. He is intubated.   HENT:   Head: Normocephalic and atraumatic.   Mouth/Throat: Oropharynx is clear and moist.   Eyes: Conjunctivae are normal. Pupils are equal, round, and reactive to light. Right eye exhibits no discharge. Left eye exhibits no discharge. No scleral icterus.   Neck: Trachea normal, normal range of motion and full passive range of motion without pain. Neck supple. No JVD present. No tracheal deviation present. No thyromegaly present.   Cardiovascular: Normal rate, regular rhythm, S1 normal, S2  normal, normal heart sounds and intact distal pulses. Exam reveals no gallop and no friction rub.   No murmur heard.  Pulmonary/Chest: Breath sounds normal. He is intubated. No respiratory distress. He has no wheezes. He has no rales. He exhibits no tenderness.   Abdominal: Soft. Bowel sounds are normal. He exhibits distension. He exhibits no mass. There is no tenderness. There is no rebound and no guarding.   Musculoskeletal: Normal range of motion. He exhibits no tenderness or deformity.   Lymphadenopathy:     He has no cervical adenopathy.   Neurological: No cranial nerve deficit. Coordination normal.   Skin: Skin is warm and dry. No abrasion and no bruising noted.   Psychiatric: He has a normal mood and affect.   Vitals reviewed.      Vents:  Vent Mode: PRVC (12/12/18 1130)  Ventilator Initiated: Yes (12/11/18 1413)  Set Rate: 16 bmp (12/12/18 1130)  Vt Set: 440 mL (12/12/18 1130)  PEEP/CPAP: 8 cmH20 (12/12/18 1130)  Oxygen Concentration (%): 40 (12/12/18 1145)  Peak Airway Pressure: 29.3 cmH2O (12/12/18 1130)  Total Ve: 10.6 mL (12/12/18 1130)  F/VT Ratio<105 (RSBI): (!) 57.83 (12/12/18 1130)        Significant Labs:    CBC/Anemia Profile:  Recent Labs   Lab 12/11/18  0604 12/11/18  1406 12/12/18  0226   WBC 8.68 8.50 7.80   HGB 8.5* 8.6* 9.1*   HCT 26.6* 26.2* 27.6*    297 343   MCV 95 95 92   RDW 16.9* 16.8* 16.6*        Chemistries:  Recent Labs   Lab 12/11/18  0604 12/11/18  1406 12/12/18  0226    137 139   K 4.1 4.4 3.8    106 107   CO2 26 21* 25   BUN 58* 60* 50*   CREATININE 1.2 1.4 1.1   CALCIUM 8.5* 8.2* 8.6*   ALBUMIN 2.1* 2.0* 2.1*   PROT 6.8 6.6 6.8   BILITOT 0.5 0.5 0.6   ALKPHOS 128 133 141*   ALT 16 21 19   AST 20 29 20   MG  --  1.8  --    PHOS  --  5.6*  --      Microbiology-     Blood culture- no growth to date  Wound culture (surgical specimen)- (12/5)  Aerobic Culture - Tissue ACINETOBACTER BAUMANNII/HAEMOLYTICUS   Few       Gram Stain Result Few WBC's    Gram Stain Result  Few Gram negative rods    Resulting Agency [facility]   Susceptibility      Acinetobacter baumannii/haemolyticus     CULTURE, TISSUE     Amikacin <=16  Sensitive     Amp/Sulbactam 16/8  Intermediate     Cefepime 16  Intermediate     Ceftriaxone 32  Intermediate     Ciprofloxacin >2  Resistant     Gentamicin 8  Intermediate     Meropenem <=4  Sensitive     Tetracycline 8  Intermediate     Tobramycin <=4  Sensitive     Trimeth/Sulfa >2/38  Resistant             Significant Imaging:   I have reviewed and interpreted all pertinent imaging results/findings within the past 24 hours.     CT chest-   No evidence of pulmonary thromboembolism.  Bilateral pleural effusions with moderate amount of adjacent atelectasis/consolidation of the lower lobes of the lungs bilaterally.  Moderate volume of ascites  Pulmonary parenchyma is unremarkable without evidence to suggest underlying ILD- there is some consolidative opacities in the bilateral lower lung zones which is consistent with likely compressive atelectasis from his pleural effusions and abdominal distention.    Lower extremity ultrasound- no evidence of deep vein thrombosis in either lower extremity.    CT abdomen and pelvis-    1. No findings to suggest pneumoperitoneum as clinically questioned.  2. Bilateral pleural effusions with associated adjacent compressive atelectasis versus consolidation.  Developing infection not excluded.  3. Moderate abdominopelvic ascites and associated body wall anasarca.  4. Moderate stool in the rectum       Echocardiogram    · Severely decreased left ventricular systolic function. The estimated ejection fraction is 20%  · Severe global hypokinetic wall motion. Cannot exclude RWMA.  · Septal wall has abnormal motion. Systolic and diastolic flattening of the interventricular septum consistent with right ventricle pressure and volume overload.  · Left ventricular diastolic dysfunction.  · Mild right ventricular enlargement.  · Mildly to moderately  reduced right ventricular systolic function.  · Moderate tricuspid regurgitation.  · There is a large left pleural effusion.      PFT- none on file            Assessment/Plan:     * Cardiac arrest    Precipitating is a bit unclear.  ICD interrogation does not reveal a primary arrhythmia.  Troponin curve is otherwise flat not suggestive of ischemia as etiology.  No pulmonary embolism or DVT found on investigation.  I suspect likely related to hypoxemia in the setting of right ventricular failure and use of sedating medications.  He is otherwise neurologically intact, not coma.     -- continue with supportive care and optimization of cardiac function  -- no role for targeted temperature management as he is not in a coma  -- hold sedation and re-evaluate neurological status today.       Other ascites    No evidence to suggest hepatic dysfunction.  Previous paracentesis demonstrated SAAG=0.9, which is not consistent with portal hypertension or ascites secondary to cardiac dysfunction.  His albumin is grossly low.  Doppler evaluation of liver is unremarkable. A suspect the ascites is multifactorial related to hypoalbuminemia, gross volume overload..  Despite the low SAGG, I do suspect there is some contribution from his right ventricular dysfunction    -- large volume paracentesis today  -- repeat SAAG  -- check total protein  --cell count differential to exclude SBP      Elevated troponin    Troponin downtrending.  Not consistent with a primary cardiac event.  Likely strain secondary to respiratory failure and cardiac arrest.     Shock    Undifferentiated shock post cardiac arrest.- does not have any precipitating arrhythmia on ICD interrogation..  Very poor ejection fraction with evidence of right heart failure.  Clinically with coarse volume overload.  He has known wound infection demonstrating Acinetobacter and has been on ongoing meropenem.  Shock appears multifactorial in likely secondary to cardiac decompensation  and sedative infusions to facilitate mechanical ventilation.    -- continue with norepinephrine to maintain map greater than 65.  requirements are downtrending in the last 24 hr.  -- continue with diuresis to optimize cardiac function. He has responded well thus far.  -- meropenem for known wound infection.  Otherwise he has been afebrile and no additional clear source of secondary infection noted  -- follow cultures     Acute hypoxemic respiratory failure    Respiratory failure multifactorial.  Appears related to his underlying cardiac arrest and ongoing cardiac decompensation and gross volume overload with impaired diaphragmatic excursion secondary to tense abdominal ascites.  Has bilateral pleural effusions with significant ascites noted. I suspect that the pleural effusions are transudative and reflect ascitic fluid crossing the diaphragms.   Over the last 24 hr his gas exchange has improved and respiratory mechanics are adequate.    -- continue with diuresis as tolerated  -- needs large volume paracentesis  -- wean supplemental O2 to maintain SpO2 greater than 88%  -- lung protective ventilation  -- plan for SAT/SBT once paracentesis completed and volume status is optimized     Foot infection    Secondary to DM/PVD- s/p revascularization on 11/13/2018 including a left SFA, AT and peroneal angioplasty to improve perfusion to his left foot due to extensive left foot   Wound. Wound again debrided by vascular on November 29th and 12/5.  Cultures with Acinetobacter.  Continue IV Meropenem.          HLD (hyperlipidemia)    Intolerant of statins secondary to prior statin induced myositis     DM type 2 with diabetic peripheral neuropathy    Hemoglobin A1c- 8.  Sliding scale insulin.  Target glucose 140-180       DVT prophylaxis- Lovenox  Nutrition- start TF     Critical Care Time: 55 minutes  Critical care was time spent personally by me on the following activities: evaluating this patient's organ dysfunction,  development of treatment plan, discussing treatment plan with patient or surrogate and bedside caregivers, discussions with consultants, evaluation of patient's response to treatment, examination of patient, ordering and performing treatments and interventions, ordering and review of laboratory studies, ordering and review of radiographic studies, re-evaluation of patient's condition. This critical care time did not overlap with that of any other provider or involve time for any procedures.       Marlon Yee MD  Pulmonary/Critical Care Medicine   [facility]

## 2018-12-13 PROBLEM — N28.9 ACUTE RENAL INSUFFICIENCY: Status: ACTIVE | Noted: 2018-12-13

## 2018-12-13 PROBLEM — E11.628 DIABETIC FOOT INFECTION: Status: ACTIVE | Noted: 2018-12-10

## 2018-12-13 LAB
ALBUMIN FLD-MCNC: 1.5 G/DL
ALBUMIN SERPL BCP-MCNC: 1.9 G/DL
ALLENS TEST: ABNORMAL
ALP SERPL-CCNC: 129 U/L
ALT SERPL W/O P-5'-P-CCNC: 14 U/L
ANION GAP SERPL CALC-SCNC: 7 MMOL/L
AST SERPL-CCNC: 14 U/L
BASOPHILS # BLD AUTO: 0.03 K/UL
BASOPHILS NFR BLD: 0.3 %
BILIRUB SERPL-MCNC: 0.6 MG/DL
BILIRUB UR QL STRIP: NEGATIVE
BUN SERPL-MCNC: 29 MG/DL
CALCIUM SERPL-MCNC: 8.7 MG/DL
CHLORIDE SERPL-SCNC: 106 MMOL/L
CLARITY UR: CLEAR
CO2 SERPL-SCNC: 28 MMOL/L
COLOR UR: YELLOW
CREAT SERPL-MCNC: 0.8 MG/DL
CREAT UR-MCNC: 41 MG/DL
DELSYS: ABNORMAL
DIFFERENTIAL METHOD: ABNORMAL
EOSINOPHIL # BLD AUTO: 0.3 K/UL
EOSINOPHIL NFR BLD: 3.9 %
ERYTHROCYTE [DISTWIDTH] IN BLOOD BY AUTOMATED COUNT: 16.7 %
ERYTHROCYTE [SEDIMENTATION RATE] IN BLOOD BY WESTERGREN METHOD: 16 MM/H
EST. GFR  (AFRICAN AMERICAN): >60 ML/MIN/1.73 M^2
EST. GFR  (NON AFRICAN AMERICAN): >60 ML/MIN/1.73 M^2
FIO2: 40
GLUCOSE SERPL-MCNC: 185 MG/DL
GLUCOSE UR QL STRIP: NEGATIVE
HCO3 UR-SCNC: 26.7 MMOL/L (ref 24–28)
HCT VFR BLD AUTO: 28.7 %
HGB BLD-MCNC: 9.5 G/DL
HGB UR QL STRIP: NEGATIVE
KETONES UR QL STRIP: NEGATIVE
LEUKOCYTE ESTERASE UR QL STRIP: NEGATIVE
LYMPHOCYTES # BLD AUTO: 0.5 K/UL
LYMPHOCYTES NFR BLD: 5.3 %
MCH RBC QN AUTO: 30.9 PG
MCHC RBC AUTO-ENTMCNC: 33.1 G/DL
MCV RBC AUTO: 94 FL
MIN VOL: 7.2
MODE: ABNORMAL
MONOCYTES # BLD AUTO: 1 K/UL
MONOCYTES NFR BLD: 11.1 %
NEUTROPHILS # BLD AUTO: 7 K/UL
NEUTROPHILS NFR BLD: 79.4 %
NITRITE UR QL STRIP: NEGATIVE
PCO2 BLDA: 39.8 MMHG (ref 35–45)
PEEP: 8
PH SMN: 7.43 [PH] (ref 7.35–7.45)
PH UR STRIP: 5 [PH] (ref 5–8)
PIP: 19
PLATELET # BLD AUTO: 346 K/UL
PMV BLD AUTO: 8.5 FL
PO2 BLDA: 144 MMHG (ref 80–100)
POC BE: 2 MMOL/L
POC SATURATED O2: 99 % (ref 95–100)
POC TCO2: 28 MMOL/L (ref 23–27)
POCT GLUCOSE: 149 MG/DL (ref 70–110)
POCT GLUCOSE: 157 MG/DL (ref 70–110)
POCT GLUCOSE: 182 MG/DL (ref 70–110)
POCT GLUCOSE: 194 MG/DL (ref 70–110)
POTASSIUM SERPL-SCNC: 3.8 MMOL/L
PROT FLD-MCNC: 4.2 G/DL
PROT SERPL-MCNC: 6.7 G/DL
PROT UR QL STRIP: NEGATIVE
PROT UR-MCNC: 20 MG/DL
PROT/CREAT UR: 0.49 MG/G{CREAT}
RBC # BLD AUTO: 3.07 M/UL
SAMPLE: ABNORMAL
SITE: ABNORMAL
SODIUM SERPL-SCNC: 141 MMOL/L
SP GR UR STRIP: 1.01 (ref 1–1.03)
SP02: 99
SPECIMEN SOURCE: NORMAL
SPECIMEN SOURCE: NORMAL
URN SPEC COLLECT METH UR: NORMAL
UROBILINOGEN UR STRIP-ACNC: NEGATIVE EU/DL
VT: 440
WBC # BLD AUTO: 8.8 K/UL

## 2018-12-13 PROCEDURE — 99900026 HC AIRWAY MAINTENANCE (STAT)

## 2018-12-13 PROCEDURE — 85025 COMPLETE CBC W/AUTO DIFF WBC: CPT

## 2018-12-13 PROCEDURE — 63600175 PHARM REV CODE 636 W HCPCS: Performed by: INTERNAL MEDICINE

## 2018-12-13 PROCEDURE — S0028 INJECTION, FAMOTIDINE, 20 MG: HCPCS | Performed by: HOSPITALIST

## 2018-12-13 PROCEDURE — 81003 URINALYSIS AUTO W/O SCOPE: CPT

## 2018-12-13 PROCEDURE — A4216 STERILE WATER/SALINE, 10 ML: HCPCS | Performed by: INTERNAL MEDICINE

## 2018-12-13 PROCEDURE — 80053 COMPREHEN METABOLIC PANEL: CPT

## 2018-12-13 PROCEDURE — 25000003 PHARM REV CODE 250: Performed by: HOSPITALIST

## 2018-12-13 PROCEDURE — 63600175 PHARM REV CODE 636 W HCPCS: Performed by: NURSE PRACTITIONER

## 2018-12-13 PROCEDURE — 63600175 PHARM REV CODE 636 W HCPCS: Performed by: HOSPITALIST

## 2018-12-13 PROCEDURE — 99900017 HC EXTUBATION W/PARAMETERS (STAT)

## 2018-12-13 PROCEDURE — 20000000 HC ICU ROOM

## 2018-12-13 PROCEDURE — 37799 UNLISTED PX VASCULAR SURGERY: CPT

## 2018-12-13 PROCEDURE — 99900035 HC TECH TIME PER 15 MIN (STAT)

## 2018-12-13 PROCEDURE — 99291 CRITICAL CARE FIRST HOUR: CPT | Mod: ,,, | Performed by: INTERNAL MEDICINE

## 2018-12-13 PROCEDURE — 99291 CRITICAL CARE FIRST HOUR: CPT | Mod: ,,, | Performed by: PHYSICIAN ASSISTANT

## 2018-12-13 PROCEDURE — 82803 BLOOD GASES ANY COMBINATION: CPT

## 2018-12-13 PROCEDURE — 25000003 PHARM REV CODE 250: Performed by: NURSE PRACTITIONER

## 2018-12-13 PROCEDURE — 36415 COLL VENOUS BLD VENIPUNCTURE: CPT

## 2018-12-13 PROCEDURE — 27000221 HC OXYGEN, UP TO 24 HOURS

## 2018-12-13 PROCEDURE — 25000003 PHARM REV CODE 250: Performed by: EMERGENCY MEDICINE

## 2018-12-13 PROCEDURE — 25000003 PHARM REV CODE 250: Performed by: INTERNAL MEDICINE

## 2018-12-13 PROCEDURE — 82570 ASSAY OF URINE CREATININE: CPT

## 2018-12-13 PROCEDURE — 94003 VENT MGMT INPAT SUBQ DAY: CPT

## 2018-12-13 RX ORDER — DORZOLAMIDE HYDROCHLORIDE AND TIMOLOL MALEATE 20; 5 MG/ML; MG/ML
1 SOLUTION/ DROPS OPHTHALMIC 3 TIMES DAILY
Status: DISCONTINUED | OUTPATIENT
Start: 2018-12-13 | End: 2018-12-20 | Stop reason: HOSPADM

## 2018-12-13 RX ADMIN — BRIMONIDINE TARTRATE 1 DROP: 1 SOLUTION/ DROPS OPHTHALMIC at 08:12

## 2018-12-13 RX ADMIN — OMEGA-3 FATTY ACIDS CAP 1000 MG 1 CAPSULE: 1000 CAP at 08:12

## 2018-12-13 RX ADMIN — Medication: at 09:12

## 2018-12-13 RX ADMIN — PROPOFOL 40 MCG/KG/MIN: 10 INJECTION, EMULSION INTRAVENOUS at 03:12

## 2018-12-13 RX ADMIN — Medication 10 ML: at 06:12

## 2018-12-13 RX ADMIN — DORZOLAMIDE HYDROCHLORIDE AND TIMOLOL MALEATE 1 DROP: 20; 5 SOLUTION OPHTHALMIC at 09:12

## 2018-12-13 RX ADMIN — PROPOFOL 40 MCG/KG/MIN: 10 INJECTION, EMULSION INTRAVENOUS at 07:12

## 2018-12-13 RX ADMIN — VANCOMYCIN HYDROCHLORIDE 1500 MG: 1 INJECTION, POWDER, LYOPHILIZED, FOR SOLUTION INTRAVENOUS at 12:12

## 2018-12-13 RX ADMIN — DORZOLAMIDE HYDROCHLORIDE AND TIMOLOL MALEATE 1 DROP: 20; 5 SOLUTION OPHTHALMIC at 03:12

## 2018-12-13 RX ADMIN — MEROPENEM 2 G: 1 INJECTION, POWDER, FOR SOLUTION INTRAVENOUS at 05:12

## 2018-12-13 RX ADMIN — ENOXAPARIN SODIUM 40 MG: 100 INJECTION SUBCUTANEOUS at 04:12

## 2018-12-13 RX ADMIN — ASPIRIN 81 MG 81 MG: 81 TABLET ORAL at 08:12

## 2018-12-13 RX ADMIN — Medication: at 08:12

## 2018-12-13 RX ADMIN — PREDNISOLONE ACETATE 1 DROP: 10 SUSPENSION/ DROPS OPHTHALMIC at 08:12

## 2018-12-13 RX ADMIN — INSULIN ASPART 2 UNITS: 100 INJECTION, SOLUTION INTRAVENOUS; SUBCUTANEOUS at 05:12

## 2018-12-13 RX ADMIN — EZETIMIBE 10 MG: 10 TABLET ORAL at 09:12

## 2018-12-13 RX ADMIN — BRIMONIDINE TARTRATE 1 DROP: 1 SOLUTION/ DROPS OPHTHALMIC at 09:12

## 2018-12-13 RX ADMIN — OMEGA-3 FATTY ACIDS CAP 1000 MG 1 CAPSULE: 1000 CAP at 09:12

## 2018-12-13 RX ADMIN — NOREPINEPHRINE-DEXTROSE IV SOLUTION 4 MG/250ML-5% 0.02 MCG/KG/MIN: 4-5/250 SOLUTION at 12:12

## 2018-12-13 RX ADMIN — CHLORHEXIDINE GLUCONATE 0.12% ORAL RINSE 15 ML: 1.2 LIQUID ORAL at 08:12

## 2018-12-13 RX ADMIN — INSULIN ASPART 2 UNITS: 100 INJECTION, SOLUTION INTRAVENOUS; SUBCUTANEOUS at 04:12

## 2018-12-13 RX ADMIN — ALLOPURINOL 200 MG: 100 TABLET ORAL at 08:12

## 2018-12-13 RX ADMIN — Medication 10 ML: at 12:12

## 2018-12-13 RX ADMIN — INSULIN ASPART 1 UNITS: 100 INJECTION, SOLUTION INTRAVENOUS; SUBCUTANEOUS at 12:12

## 2018-12-13 RX ADMIN — FAMOTIDINE 20 MG: 10 INJECTION INTRAVENOUS at 08:12

## 2018-12-13 RX ADMIN — COLLAGENASE SANTYL: 250 OINTMENT TOPICAL at 09:12

## 2018-12-13 RX ADMIN — MEROPENEM 2 G: 1 INJECTION, POWDER, FOR SOLUTION INTRAVENOUS at 02:12

## 2018-12-13 RX ADMIN — MEROPENEM 2 G: 1 INJECTION, POWDER, FOR SOLUTION INTRAVENOUS at 10:12

## 2018-12-13 RX ADMIN — FUROSEMIDE 40 MG: 10 INJECTION, SOLUTION INTRAMUSCULAR; INTRAVENOUS at 08:12

## 2018-12-13 RX ADMIN — CLOPIDOGREL BISULFATE 75 MG: 75 TABLET ORAL at 08:12

## 2018-12-13 NOTE — PROGRESS NOTES
Patient extubated and placed on BIPAP through the vent 10/5 40%. No sign of respiratory distress. Will continue to monitor and wean as tolerated.

## 2018-12-13 NOTE — ASSESSMENT & PLAN NOTE
Intubated 12/11 post code  Ascites and bilateral effusions cause decreased lung compliance-- paracentesis 12/12 with 2.4L off  Extubated to BiPAP on 12/13

## 2018-12-13 NOTE — SUBJECTIVE & OBJECTIVE
Interval History/Significant Events: Paracentesis completed yesterday afternoon. Propofol discontinued this AM. Currently on SBT and tolerating well.     Review of Systems   Unable to perform ROS: Intubated     Objective:     Vital Signs (Most Recent):  Temp: 98.5 °F (36.9 °C) (12/13/18 0315)  Pulse: 74 (12/13/18 0630)  Resp: 16 (12/13/18 0630)  BP: 117/61 (12/13/18 0600)  SpO2: 99 % (12/13/18 0630) Vital Signs (24h Range):  Temp:  [97.2 °F (36.2 °C)-98.5 °F (36.9 °C)] 98.5 °F (36.9 °C)  Pulse:  [69-86] 74  Resp:  [8-33] 16  SpO2:  [94 %-100 %] 99 %  BP: (116-139)/(59-71) 117/61  Arterial Line BP: ()/() 117/46   Weight: 99.8 kg (220 lb 0.3 oz)  Body mass index is 31.57 kg/m².      Intake/Output Summary (Last 24 hours) at 12/13/2018 0956  Last data filed at 12/13/2018 0600  Gross per 24 hour   Intake 1667.98 ml   Output 4570 ml   Net -2902.02 ml       Physical Exam   Constitutional: He appears well-developed and well-nourished. He appears ill. He is sedated, intubated and restrained.   HENT:   Head: Normocephalic and atraumatic.   Eyes: Right pupil is not round. Right pupil is reactive. Left pupil is not round. Left pupil is reactive.   Neck: Normal range of motion. No tracheal deviation present. No thyromegaly present.   Cardiovascular: Normal rate and regular rhythm. Exam reveals no gallop and no friction rub.   No murmur heard.  Pulses:       Dorsalis pedis pulses are 2+ on the right side.   Pulmonary/Chest: Breath sounds normal. No stridor. No tachypnea. He is intubated. He has no decreased breath sounds. He has no wheezes. He has no rhonchi. He has no rales.   Abdominal: Soft. Bowel sounds are normal. There is no tenderness.   Musculoskeletal: Normal range of motion.   Right first and second toe amputated  Bandaging covering LLE up to the knee   Neurological: He is alert. He has normal strength. No sensory deficit.   Opens eyes spontaneously. Moving all extremities spontaneously. Strength normal  bilateral upper extremity. Following commands. Answering yes/no questions.    Skin: Skin is warm and dry.       Vents:  Vent Mode: PRVC (12/13/18 0420)  Ventilator Initiated: Yes (12/11/18 1413)  Set Rate: 16 bmp (12/13/18 0420)  Vt Set: 440 mL (12/13/18 0420)  PEEP/CPAP: 8 cmH20 (12/13/18 0420)  Oxygen Concentration (%): 40 (12/13/18 0630)  Peak Airway Pressure: 17 cmH2O (12/13/18 0420)  Total Ve: 8.1 mL (12/13/18 0420)  F/VT Ratio<105 (RSBI): (!) 41.36 (12/13/18 0420)  Lines/Drains/Airways     Peripherally Inserted Central Catheter Line                 PICC Double Lumen 12/10/18 2241 right basilic 2 days          Drain                 Open Drain 11/14/18 1353 Left Foot Other (see comments) Other (see comments) 28 days         NG/OG Tube 12/11/18 1350 orogastric 7.5 Fr. Center mouth 1 day         Urethral Catheter 12/11/18 1351 Double-lumen 16 Fr. 1 day          Airway                 Airway - Non-Surgical 11/16/18 0815 Other (Comment) 27 days         Airway - Non-Surgical 12/11/18 1320 Endotracheal Tube 1 day          Arterial Line                 Arterial Line 12/11/18 1530 Left Radial 1 day              Significant Labs:    CBC/Anemia Profile:  Recent Labs   Lab 12/11/18  1406 12/12/18  0226 12/13/18  0230   WBC 8.50 7.80 8.80   HGB 8.6* 9.1* 9.5*   HCT 26.2* 27.6* 28.7*    343 346   MCV 95 92 94   RDW 16.8* 16.6* 16.7*        Chemistries:  Recent Labs   Lab 12/11/18  1406 12/12/18  0226 12/13/18  0230    139 141   K 4.4 3.8 3.8    107 106   CO2 21* 25 28   BUN 60* 50* 29*   CREATININE 1.4 1.1 0.8   CALCIUM 8.2* 8.6* 8.7   ALBUMIN 2.0* 2.1* 1.9*   PROT 6.6 6.8 6.7   BILITOT 0.5 0.6 0.6   ALKPHOS 133 141* 129   ALT 21 19 14   AST 29 20 14   MG 1.8  --   --    PHOS 5.6*  --   --        All pertinent labs within the past 24 hours have been reviewed.    Significant Imaging:  I have reviewed and interpreted all pertinent imaging results/findings within the past 24 hours.

## 2018-12-13 NOTE — CONSULTS
[facility]  Infectious Disease  Consult Note    Patient Name: Marvin Ray  MRN: [id number]  Admission Date: 12/10/2018  Hospital Length of Stay: 1 days  Attending Physician: Jazz Sotomayor MD  Primary Care Provider: Rubén Davis MD     Isolation Status: Contact    Patient information was obtained from patient and ER records.      Inpatient consult to Infectious Diseases  Consult performed by: Anuradha Jamison MD  Consult ordered by: Yesy Madison NP        Assessment/Plan:     Foot infection    Risk factor  = severe PVD and uncontrolled DM. Almost certainly has osteomyelitis based on clinical appearance and growth on bone culture. Cultures consistently grow Acinetobacter baumannii/haemolyticus and group G strep has also grown from bone culture (only treated for 7d with iv abx for a cellulitis/SSTI before). Has also grown finegoldia and enterococcus in past. Doesn't appear that the digits growing these organisms have been amputated. Agree with meropenem. Would also add vancomycin for broader coverage of these organisms that have grown with attempt to avoid double b-lactam regimen. Duration likely 6 weeks, but will depend on clinical response. Trend weekly esr, crp, bmp, vanc trough (goal 15-20). Poor vascular supply and poor dm control expected to impede wound healing.          Thank you for your consult. I will follow-up with patient. Please contact us if you have any additional questions.    Anuradha Jamison MD  Infectious Disease  [facility]    Subjective:     Principal Problem: Cardiac arrest    HPI:   58M with uncontrolled DM and vascular disease admitted with L foot wound necrosiss including underlying bone.  s/p revascularization on 11/13/2018 including a left SFA, AT and peroneal angioplasty. Wounds again debrided by vascular on 11/29 and 12/5.  when outpatient abx had nearly been arranged, he had a cardiac arrest and ROSC. Now intubated and sedated in ICU.  History obtained by wife and chart review. apparantly he has been denying other symptoms such as fever, chills, dysuria. He has been having increased abdominal distention and s/p paracentesis today, results pending.     Most recent vascular surgery 12/5, underwent:  1.  Left foot wound debridement, 25 x 18 x 1 cm.  2.  Left foot wound washout, 25 x 18 x 1 cm.  3.  Left great toe amputation including metatarsal head.         Reviewed echo:   · Severely decreased left ventricular systolic function. The estimated ejection fraction is 20%  · Severe global hypokinetic wall motion. Cannot exclude RWMA.  · Septal wall has abnormal motion. Systolic and diastolic flattening of the interventricular septum consistent with right ventricle pressure and volume overload.  · Left ventricular diastolic dysfunction.  · Mild right ventricular enlargement.  · Mildly to moderately reduced right ventricular systolic function.  · Moderate tricuspid regurgitation.  · There is a large left pleural effusion    Per chart review, only bacteremia was strep on 10/10 and he appeared to have been treated with 7d of ceftriaxone then meropenem inpatient and got several more days of keflex on discharge.     Reviewed cultures:    Collected: 10/10/18 1247   Order Status: Completed Specimen: Blood from Peripheral, Antecubital, Left Updated: 10/13/18 0842    Blood Culture, Routine Gram stain uydelka bottle: Gram positive cocci in chains resembling Strep     Blood Culture, Routine Results called to and read back by: Chloé To    Blood Culture, Routine Gram stain aer bottle: Gram positive cocci in chains resembling Strep     Blood Culture, Routine Positive results previously called 10/11/2018  18:47    Blood Culture, Routine --    GROUP G STREPTOCOCCUS   Beta-hemolytic streptococci are routinely susceptible to   penicillins,cephalosporins and carbapenems.    Susceptibility      Group g streptococcus     CULTURE, BLOOD     Amox/K Clav'ate <=0.5/.25        Ampicillin <=0.06  Sensitive     Azithromycin <=0.25  Sensitive     Cefepime <=0.25  Sensitive     Cefotaxime <=0.25  Sensitive     Ceftriaxone <=0.25  Sensitive     Chloramphenicol 4  Sensitive     Clindamycin <=0.06  Sensitive     Erythromycin <=0.06  Sensitive     Meropenem <=0.06       Penicillin <=0.03  Sensitive     Tetracycline <=0.5  Sensitive     Trimeth/Sulfa <=.25/4.7       Vancomycin 0.5  Sensitive            2nd - 5th toe left foot   Susceptibility      Acinetobacter baumannii/haemolyticus Enterococcus faecalis     CULTURE, AEROBIC  (SPECIFY SOURCE) CULTURE, AEROBIC  (SPECIFY SOURCE)     Amikacin <=16  Sensitive       Amp/Sulbactam >16/8  Resistant       Ampicillin   <=2  Sensitive     Cefepime <=8  Sensitive       Ceftriaxone 32  Intermediate       Ciprofloxacin <=1  Sensitive       Gentamicin <=4  Sensitive       Gentamicin Synergy Screen   <=500  Sensitive     Meropenem <=4  Sensitive       Tetracycline <=4  Sensitive <=4  Sensitive     Tobramycin <=4  Sensitive       Trimeth/Sulfa >2/38  Resistant       Vancomycin   2  Sensitive              Linear View         Fungus culture [[id number]] Collected: 11/16/18 0838   Order Status: Completed Specimen: Bone from Foot, Left Updated: 12/03/18 1358    Fungus (Mycology) Culture FUSARIUM SPECIES   Narrative:     2nd - 5th toe left foot     Narrative:     2nd -5th toe left foot   Susceptibility      Acinetobacter baumannii/haemolyticus     CULTURE, AEROBIC  (SPECIFY SOURCE)     Amikacin <=16  Sensitive     Amp/Sulbactam >16/8  Resistant     Cefepime >16  Resistant     Ceftriaxone >32  Resistant     Ciprofloxacin <=1  Sensitive     Gentamicin <=4  Sensitive     Meropenem <=4  Sensitive     Tetracycline <=4  Sensitive     Tobramycin <=4  Sensitive     Trimeth/Sulfa >2/38  Resistant             Acinetobacter baumannii/haemolyticus     CULTURE, TISSUE     Amikacin <=16  Sensitive     Amp/Sulbactam 16/8  Intermediate     Cefepime 16  Intermediate     Ceftriaxone 32   Intermediate     Ciprofloxacin >2  Resistant     Gentamicin 8  Intermediate     Meropenem <=4  Sensitive     Tetracycline 8  Intermediate     Tobramycin <=4  Sensitive     Trimeth/Sulfa >2/38  Resistant            Past Medical History:   Diagnosis Date    Arthritis     Cellulitis     CKD (chronic kidney disease), stage III     Coronary artery disease     Diabetes mellitus     Diabetic retinopathy     Diabetic ulcer of left foot     Glaucoma     Gout     Hyperlipemia     Hypertension     ICD (implantable cardioverter-defibrillator) in place 11/02/2018    Left chest    Non-pressure chronic ulcer of other part of left foot with fat layer exposed 10/23/2018    PVD (peripheral vascular disease)     Type 2 diabetes mellitus with left diabetic foot ulcer 10/29/2018    Unsteady gait     uses a wheelchair     Interval History: Intubated, sedated.     Review of Systems   Unable to perform ROS: Intubated     Objective:     Vital Signs (Most Recent):  Temp: 97.2 °F (36.2 °C) (12/12/18 1915)  Pulse: 74 (12/12/18 2315)  Resp: 16 (12/12/18 2315)  BP: 139/70 (12/12/18 2300)  SpO2: 100 % (12/12/18 2315) Vital Signs (24h Range):  Temp:  [97.2 °F (36.2 °C)-98.4 °F (36.9 °C)] 97.2 °F (36.2 °C)  Pulse:  [65-87] 74  Resp:  [4-33] 16  SpO2:  [94 %-100 %] 100 %  BP: (107-139)/(57-75) 139/70  Arterial Line BP: ()/(39-75) 97/48     Weight: 109.8 kg (242 lb)  Body mass index is 34.72 kg/m².    Intake/Output Summary (Last 24 hours) at 12/12/2018 2344  Last data filed at 12/12/2018 2230  Gross per 24 hour   Intake 1372.27 ml   Output 5710 ml   Net -4337.73 ml      Physical Exam   Constitutional: He appears well-developed and well-nourished. No distress.   HENT:   Head: Normocephalic and atraumatic.   Mouth/Throat: Oropharynx is clear and moist.   ETT and OGT in place   Cardiovascular: Normal rate, regular rhythm, normal heart sounds and intact distal pulses. Exam reveals no gallop and no friction rub.   No murmur  heard.  Pulmonary/Chest: Effort normal and breath sounds normal. No stridor. No respiratory distress. He has no wheezes. He has no rales.   Mechanical ventilation   Abdominal: Bowel sounds are normal. He exhibits distension. He exhibits no mass. There is no tenderness. There is no guarding.   Musculoskeletal: He exhibits edema.   Neurological:   Sedated with propofol, awakens and turns head easily   Skin: He is not diaphoretic.   Bilateral feet dressed ; chronic vevous stasis changes. Dressings c/d/i  Nursing note and vitals reviewed.      Significant Labs: All pertinent labs within the past 24 hours have been reviewed.    Significant Imaging: I have reviewed and interpreted all pertinent imaging results/findings within the past 24 hours.    Past Surgical History:   Procedure Laterality Date    AHMED GLAUCOMA IMPLANT Left 2011    DONE AT [facility]    AMPUTATION, TOE Left 12/5/2018    Performed by Mitch Chan [facility] at [facility]    AMPUTATION, TOES  2-5 Left 11/16/2018    Performed by [facility] at [facility]    BAERVELDT GLAUCOMA IMPLANT Left 2012    WITH CATARACT EXTRACTION//DONE AT [facility]    CARDIAC CATHETERIZATION Left 05/2016    CARDIAC DEFIBRILLATOR PLACEMENT Left 11/02/2018    CATARACT EXTRACTION W/  INTRAOCULAR LENS IMPLANT Left 2012    WITH BAERVEDT//DONE AT [facility]    CATARACT EXTRACTION W/  INTRAOCULAR LENS IMPLANT Right 09/26/2018    COMPLEX ()    CB DESTRUCTION WITH CYCLO G6 Left 02/15/2017        CYST REMOVAL      Exam under anesthesia, left foot debridement, washout and all other indicated procedures Left 12/5/2018    Performed by Mitch [facility] at [facility]    EXAMINATION UNDER ANESTHESIA Left 12/5/2018    Procedure: Exam under anesthesia, left foot debridement, washout and all other indicated procedures;  Surgeon: Mitch Chan MD;  Location: [facility] OR;  Service: Vascular;  Laterality: Left;  1030AM START  RN PREOP 12/3/2018-----AICD  SEEN  BY DR GREER 12/4    HEART CATH-LEFT N/A 5/6/2016    Performed by Mike Magana MD at [facility] CATH LAB    INCISION AND DRAINAGE Left 11/14/2018    Procedure: Incision and Drainage, left lower extremity debridement, washout;  Surgeon: Mitch Chan MD;  Location: [facility] OR;  Service: Vascular;  Laterality: Left;    INCISION AND DRAINAGE Left 11/16/2018    Procedure: INCISION AND DRAINAGE;  Surgeon: Mitch Chan MD;  Location: [facility];  Service: Vascular;  Laterality: Left;    INCISION AND DRAINAGE Left 11/16/2018    Performed by Mitch Chan MD at [facility] OR    Incision and Drainage, left lower extremity debridement, washout Left 11/14/2018    Performed by Mitch Chan MD at [facility] OR    INSERTION, ICD GENERATOR, SINGLE CHAMBER N/A 11/2/2018    Performed by Pernell Greer MD at [facility] CATH LAB    INSERTION, IOL PROSTHESIS Right 9/26/2018    Performed by Perla Cortés MD at [facility] OR [address] [place] [address]    INTRAOCULAR PROSTHESES INSERTION Right 9/26/2018    Procedure: INSERTION, IOL PROSTHESIS;  Surgeon: Perla Cortés MD;  Location: [facility] OR [address] [place] [address];  Service: Ophthalmology;  Laterality: Right;    PHACOEMULSIFICATION OF CATARACT Right 9/26/2018    Procedure: PHACOEMULSIFICATION, CATARACT;  Surgeon: Perla Cortés MD;  Location: [facility] OR [address] [place] [address];  Service: Ophthalmology;  Laterality: Right;    PHACOEMULSIFICATION, CATARACT Right 9/26/2018    Performed by Perla Cortés MD at [facility] OR [address] [place] [address]    Right foot surgery  10/2014    TOE AMPUTATION Right     first and second    TOE AMPUTATION Left 11/16/2018    Procedure: AMPUTATION, TOES  2-5;  Surgeon: Mitch Chan MD;  Location: [facility] OR;  Service: Vascular;  Laterality: Left;    TOE AMPUTATION Left 12/5/2018    Procedure: AMPUTATION, TOE;  Surgeon: Mitch Chan MD;  Location: [facility];  Service: Vascular;  Laterality: Left;  left great toe    TONSILLECTOMY      TRABECULECTOMY/G 6 LASER Left 2/15/2017     "Performed by Perla Cortés MD at [facility] OR [address] [place] [address]       Review of patient's allergies indicates:   Allergen Reactions    Statins-hmg-coa reductase inhibitors      Generalized Pain    Onglyza [saxagliptin]     Penicillins Rash       Medications:  Medications Prior to Admission   Medication Sig    allopurinol (ZYLOPRIM) 100 MG tablet Take 2 tablets (200 mg total) by mouth once daily.    amLODIPine (NORVASC) 5 MG tablet TAKE 1 TABLET(5 MG) BY MOUTH EVERY DAY    aspirin 81 MG Chew Take 81 mg by mouth once daily.    carvedilol (COREG) 6.25 MG tablet Take 1 tablet (6.25 mg total) by mouth 2 (two) times daily.    coenzyme Q10 100 mg capsule Take 100 mg by mouth every morning.    collagenase (SANTYL) ointment Apply topically once daily.    dorzolamide-timolol 2-0.5% (COSOPT) 22.3-6.8 mg/mL ophthalmic solution Place 1 drop into both eyes 3 (three) times daily.    ezetimibe (ZETIA) 10 mg tablet Take 1 tablet (10 mg total) by mouth once daily.    fish oil-omega-3 fatty acids 300-1,000 mg capsule Take 2 capsules (2 g total) by mouth 2 (two) times daily. (Patient taking differently: Take 1 g by mouth 2 (two) times daily. )    insulin glargine-lixisenatide (SOLIQUA 100/33) 100 unit-33 mcg/mL InPn Inject 34 units once daily    ketorolac 0.5% (ACULAR) 0.5 % Drop Place 1 drop into the right eye 3 (three) times daily.    MULTIVIT,THER IRON,CA,FA & MIN (MULTIVITAMIN) Tab Take 1 tablet by mouth every morning.     oxyCODONE-acetaminophen (PERCOCET) 5-325 mg per tablet Take 1 tablet by mouth every 6 (six) hours as needed for Pain.    pen needle, diabetic 31 gauge x 1/4" Ndle Use as directed    [DISCONTINUED] benazepril (LOTENSIN) 40 MG tablet TAKE 1 TABLET(40 MG) BY MOUTH TWICE DAILY    [DISCONTINUED] brimonidine 0.1% (ALPHAGAN P) 0.1 % Drop Place 1 drop into both eyes 3 (three) times daily.    [DISCONTINUED] furosemide (LASIX) 40 MG tablet Take 1 tablet (40 mg total) by mouth 2 (two) times daily. "     Antibiotics (From admission, onward)    Start     Stop Route Frequency Ordered    18 1800  meropenem (MERREM) 2 g in sodium chloride 0.9% 100 mL IVPB      -- IV Every 8 hours (non-standard times) 18 1143        Antifungals (From admission, onward)    Start     Stop Route Frequency Ordered    18 2100  miconazole NITRATE 2 % top powder      -- Top 2 times daily 18 1345        Antivirals (From admission, onward)    None           Immunization History   Administered Date(s) Administered    Influenza - Quadrivalent - PF 10/28/2013, 10/23/2014, 10/22/2015, 2017, 2018    Influenza - Trivalent - PF (PED) 10/22/2014    Pneumococcal Polysaccharide - 23 Valent 10/22/2015       Family History     Problem Relation (Age of Onset)    Diabetes Mother    Heart disease Brother    No Known Problems Father, Sister, Maternal Aunt, Maternal Uncle, Paternal Aunt, Paternal Uncle, Maternal Grandmother, Maternal Grandfather, Paternal Grandmother, Paternal Grandfather        Social History     Socioeconomic History    Marital status: Legally      Spouse name: None    Number of children: None    Years of education: 14    Highest education level: None   Social Needs    Financial resource strain: None    Food insecurity - worry: None    Food insecurity - inability: None    Transportation needs - medical: None    Transportation needs - non-medical: None   Occupational History    None   Tobacco Use    Smoking status: Former Smoker     Packs/day: 1.00     Years: 3.00     Pack years: 3.00     Types: Cigarettes     Last attempt to quit: 1984     Years since quittin.4    Smokeless tobacco: Never Used   Substance and Sexual Activity    Alcohol use: Yes     Alcohol/week: 0.6 oz     Types: 1 Cans of beer per week     Comment: Occasional    Drug use: No    Sexual activity: Not Currently   Other Topics Concern    None   Social History Narrative    None     Review of  Systems  Objective:     Vital Signs (Most Recent):  Temp: 97.9 °F (36.6 °C) (12/12/18 1200)  Pulse: 80 (12/12/18 1430)  Resp: (!) 23 (12/12/18 1430)  BP: 117/62 (12/12/18 1000)  SpO2: 95 % (12/12/18 1430) Vital Signs (24h Range):  Temp:  [97.9 °F (36.6 °C)-98.4 °F (36.9 °C)] 97.9 °F (36.6 °C)  Pulse:  [64-87] 80  Resp:  [4-25] 23  SpO2:  [94 %-100 %] 95 %  BP: (107-130)/(57-75) 117/62  Arterial Line BP: ()/(39-70) 78/70     Weight: 109.8 kg (242 lb)  Body mass index is 34.72 kg/m².    Estimated Creatinine Clearance: 88.6 mL/min (based on SCr of 1.1 mg/dL).    Physical Exam    Significant Labs: All pertinent labs within the past 24 hours have been reviewed.    Significant Imaging: I have reviewed all pertinent imaging results/findings within the past 24 hours.

## 2018-12-13 NOTE — ASSESSMENT & PLAN NOTE
Tissue culture obtained by Vascular Surgery obtained 12/5/18 grew Acinetobacter baumannii/haemolyticus sensitive to meropenem.    ID consulted- recommend meropenem and vanc x6 weeks   PICC in place R arm for outpatient antibiotics  Wound care following  ESR 72, CRP 89 on 12/11- trend weekly  Poor wound healing likely given PAD and DM with extensive wound  Will need ID follow up

## 2018-12-13 NOTE — PROGRESS NOTES
Patient irritated with the BIPAP and mask. Spoke to the MD and got the ok to take the patient off for a while. He was placed on a 3lpm NC. Will continue to monitor. BiPAP is on standby.

## 2018-12-13 NOTE — ASSESSMENT & PLAN NOTE
Unclear etiology. ICD with no recorded arrhythmias that would cause cardiac arrest. Troponin trended down, unlikely ischemic in origin. US LE negative and CTA negative for PE. Possibly 2/2 hypoxemic event in setting of right hear failure. Last TTE 12/11/2018 with severe global hypokinetic wall and EF 20%. TTM was not iniated.   --Continue supportive care  --Off sedation this AM and responding appropriately neurologically.

## 2018-12-13 NOTE — ASSESSMENT & PLAN NOTE
After cardiac arrest, patient requiring levophed to sustain MAPs >65. Now off.   --Shock now resolved.

## 2018-12-13 NOTE — SUBJECTIVE & OBJECTIVE
Interval History: Paracentesis yesterday afternoon. Propofol turned off this AM. Passed SAT/SBT and was extubated.     Objective:     Vital Signs (Most Recent):  Temp: 98.5 °F (36.9 °C) (12/13/18 0315)  Pulse: 74 (12/13/18 0630)  Resp: 16 (12/13/18 0630)  BP: 117/61 (12/13/18 0600)  SpO2: 99 % (12/13/18 0630) Vital Signs (24h Range):  Temp:  [97.2 °F (36.2 °C)-98.5 °F (36.9 °C)] 98.5 °F (36.9 °C)  Pulse:  [69-86] 74  Resp:  [8-33] 16  SpO2:  [94 %-100 %] 99 %  BP: (116-139)/(59-71) 117/61  Arterial Line BP: ()/() 117/46     Weight: 99.8 kg (220 lb 0.3 oz)  Body mass index is 31.57 kg/m².      Intake/Output Summary (Last 24 hours) at 12/13/2018 1051  Last data filed at 12/13/2018 0600  Gross per 24 hour   Intake 1625.08 ml   Output 3770 ml   Net -2144.92 ml       Physical Exam   Constitutional: He appears well-developed and well-nourished. He appears ill. He is sedated, intubated and restrained.   HENT:   Head: Normocephalic and atraumatic.   Eyes: Right pupil is not round. Right pupil is reactive. Left pupil is not round. Left pupil is reactive.   Neck: Normal range of motion. No tracheal deviation present. No thyromegaly present.   Cardiovascular: Normal rate and regular rhythm. Exam reveals no gallop and no friction rub.   No murmur heard.  Pulses:       Dorsalis pedis pulses are 2+ on the right side.   Pulmonary/Chest: Breath sounds normal. No stridor. No tachypnea. He is intubated. He has no decreased breath sounds. He has no wheezes. He has no rhonchi. He has no rales.   Abdominal: Soft. Bowel sounds are normal. There is no tenderness.   Musculoskeletal: Normal range of motion.   Right first and second toe amputated  Bandaging covering LLE up to the knee   Neurological: He is alert. He has normal strength. No sensory deficit.   Opens eyes spontaneously. Moving all extremities spontaneously. Strength normal bilateral upper extremity. Following commands. Answering yes/no questions.    Skin: Skin is  warm and dry.       Vents:  Vent Mode: PRVC (12/13/18 0420)  Ventilator Initiated: Yes (12/11/18 1413)  Set Rate: 16 bmp (12/13/18 0420)  Vt Set: 440 mL (12/13/18 0420)  PEEP/CPAP: 8 cmH20 (12/13/18 0420)  Oxygen Concentration (%): 40 (12/13/18 0630)  Peak Airway Pressure: 17 cmH2O (12/13/18 0420)  Total Ve: 8.1 mL (12/13/18 0420)  F/VT Ratio<105 (RSBI): (!) 41.36 (12/13/18 0420)    Lines/Drains/Airways     Peripherally Inserted Central Catheter Line                 PICC Double Lumen 12/10/18 2241 right basilic 2 days          Drain                 Open Drain 11/14/18 1353 Left Foot Other (see comments) Other (see comments) 28 days         NG/OG Tube 12/11/18 1350 orogastric 7.5 Fr. Center mouth 1 day         Urethral Catheter 12/11/18 1351 Double-lumen 16 Fr. 1 day          Airway                 Airway - Non-Surgical 11/16/18 0815 Other (Comment) 27 days         Airway - Non-Surgical 12/11/18 1320 Endotracheal Tube 1 day          Arterial Line                 Arterial Line 12/11/18 1530 Left Radial 1 day                Significant Labs:    CBC/Anemia Profile:  Recent Labs   Lab 12/11/18  1406 12/12/18  0226 12/13/18  0230   WBC 8.50 7.80 8.80   HGB 8.6* 9.1* 9.5*   HCT 26.2* 27.6* 28.7*    343 346   MCV 95 92 94   RDW 16.8* 16.6* 16.7*        Chemistries:  Recent Labs   Lab 12/11/18  1406 12/12/18  0226 12/13/18  0230    139 141   K 4.4 3.8 3.8    107 106   CO2 21* 25 28   BUN 60* 50* 29*   CREATININE 1.4 1.1 0.8   CALCIUM 8.2* 8.6* 8.7   ALBUMIN 2.0* 2.1* 1.9*   PROT 6.6 6.8 6.7   BILITOT 0.5 0.6 0.6   ALKPHOS 133 141* 129   ALT 21 19 14   AST 29 20 14   MG 1.8  --   --    PHOS 5.6*  --   --        All pertinent labs within the past 24 hours have been reviewed.    Significant Imaging:  I have reviewed and interpreted all pertinent imaging results/findings within the past 24 hours.

## 2018-12-13 NOTE — PLAN OF CARE
Problem: Adult Inpatient Plan of Care  Goal: Plan of Care Review  Outcome: Ongoing (interventions implemented as appropriate)  Remains in ICU, orally intubated, propofol for sedation, levophed for blood pressure support. R upper arm PICC clean, dry and intact. Responds to touch/pain. Iyer w/ adequate urine output. OG clamped. Dressings intact to wounds to BLE, Z FLEX boots in place. Swelling noted to Right hand present prior to PICC placement, elevated on pillow. Generalized edema noted. Remains free of falls and injuries.

## 2018-12-13 NOTE — ASSESSMENT & PLAN NOTE
Secondary to DM/PVD- s/p revascularization on 11/13/2018 including a left SFA, AT and peroneal angioplasty to improve perfusion to his left foot due to extensive left foot   Wound. Wound again debrided by vascular on November 29th and 12/5.  Cultures with Acinetobacter.  Continue IV Meropenem and vancomycin for 6 weeks.

## 2018-12-13 NOTE — PROGRESS NOTES
[facility]  Pulmonology  Progress Note    Patient Name: Marvin Ray  MRN: [id number]  Admission Date: 12/10/2018  Hospital Length of Stay: 2 days  Code Status: Full Code  Attending Provider: Jazz Sotomayor MD  Primary Care Provider: Rubén Davis MD   Principal Problem: Cardiac arrest    Subjective:     Interval History: Paracentesis yesterday afternoon. Propofol turned off this AM. Passed SAT/SBT and was extubated to bipap.     Objective:     Vital Signs (Most Recent):  Temp: 98.5 °F (36.9 °C) (12/13/18 0315)  Pulse: 74 (12/13/18 0630)  Resp: 16 (12/13/18 0630)  BP: 117/61 (12/13/18 0600)  SpO2: 99 % (12/13/18 0630) Vital Signs (24h Range):  Temp:  [97.2 °F (36.2 °C)-98.5 °F (36.9 °C)] 98.5 °F (36.9 °C)  Pulse:  [69-86] 74  Resp:  [8-33] 16  SpO2:  [94 %-100 %] 99 %  BP: (116-139)/(59-71) 117/61  Arterial Line BP: ()/() 117/46     Weight: 99.8 kg (220 lb 0.3 oz)  Body mass index is 31.57 kg/m².      Intake/Output Summary (Last 24 hours) at 12/13/2018 1051  Last data filed at 12/13/2018 0600  Gross per 24 hour   Intake 1625.08 ml   Output 3770 ml   Net -2144.92 ml       Physical Exam   Constitutional: He appears well-developed and well-nourished. He appears ill. He is sedated, intubated and restrained.   HENT:   Head: Normocephalic and atraumatic.   Eyes: Right pupil is not round. Right pupil is reactive. Left pupil is not round. Left pupil is reactive.   Neck: Normal range of motion. No tracheal deviation present. No thyromegaly present.   Cardiovascular: Normal rate and regular rhythm. Exam reveals no gallop and no friction rub.   No murmur heard.  Pulses:       Dorsalis pedis pulses are 2+ on the right side.   Pulmonary/Chest: Breath sounds normal. No stridor. No tachypnea. He is intubated. He has no decreased breath sounds. He has no wheezes. He has no rhonchi. He has no rales.   Abdominal: Soft. Bowel sounds are normal. There is no tenderness.   Musculoskeletal: Normal  range of motion.   Right first and second toe amputated  Bandaging covering LLE up to the knee   Neurological: He is alert. He has normal strength. No sensory deficit.   Opens eyes spontaneously. Moving all extremities spontaneously. Strength normal bilateral upper extremity. Following commands. Answering yes/no questions.    Skin: Skin is warm and dry.       Vents:  Vent Mode: PRVC (12/13/18 0420)  Ventilator Initiated: Yes (12/11/18 1413)  Set Rate: 16 bmp (12/13/18 0420)  Vt Set: 440 mL (12/13/18 0420)  PEEP/CPAP: 8 cmH20 (12/13/18 0420)  Oxygen Concentration (%): 40 (12/13/18 0630)  Peak Airway Pressure: 17 cmH2O (12/13/18 0420)  Total Ve: 8.1 mL (12/13/18 0420)  F/VT Ratio<105 (RSBI): (!) 41.36 (12/13/18 0420)    Lines/Drains/Airways     Peripherally Inserted Central Catheter Line                 PICC Double Lumen 12/10/18 2241 right basilic 2 days          Drain                 Open Drain 11/14/18 1353 Left Foot Other (see comments) Other (see comments) 28 days         NG/OG Tube 12/11/18 1350 orogastric 7.5 Fr. Center mouth 1 day         Urethral Catheter 12/11/18 1351 Double-lumen 16 Fr. 1 day          Airway                 Airway - Non-Surgical 11/16/18 0815 Other (Comment) 27 days         Airway - Non-Surgical 12/11/18 1320 Endotracheal Tube 1 day          Arterial Line                 Arterial Line 12/11/18 1530 Left Radial 1 day                Significant Labs:    CBC/Anemia Profile:  Recent Labs   Lab 12/11/18  1406 12/12/18 0226 12/13/18  0230   WBC 8.50 7.80 8.80   HGB 8.6* 9.1* 9.5*   HCT 26.2* 27.6* 28.7*    343 346   MCV 95 92 94   RDW 16.8* 16.6* 16.7*        Chemistries:  Recent Labs   Lab 12/11/18  1406 12/12/18  0226 12/13/18  0230    139 141   K 4.4 3.8 3.8    107 106   CO2 21* 25 28   BUN 60* 50* 29*   CREATININE 1.4 1.1 0.8   CALCIUM 8.2* 8.6* 8.7   ALBUMIN 2.0* 2.1* 1.9*   PROT 6.6 6.8 6.7   BILITOT 0.5 0.6 0.6   ALKPHOS 133 141* 129   ALT 21 19 14   AST 29 20 14   MG  1.8  --   --    PHOS 5.6*  --   --        All pertinent labs within the past 24 hours have been reviewed.    Significant Imaging:  I have reviewed and interpreted all pertinent imaging results/findings within the past 24 hours.    Assessment/Plan:     * Cardiac arrest    Unclear etiology.  ICD interrogation does not reveal a primary arrhythmia.  Troponin curve is otherwise flat not suggestive of ischemia as etiology.  No pulmonary embolism or DVT found on investigation.  I suspect likely related to hypoxemia in the setting of right ventricular failure and use of sedating medications. TTM not intiated due to patients neurologic response s/p cardiac arrest.  -- continue with supportive care and optimization of cardiac function  -- Discontinued propofol this AM; neurologic status improved     Other ascites    No evidence to suggest hepatic dysfunction.  Previous paracentesis demonstrated SAAG=0.9, which is not consistent with portal hypertension or ascites secondary to cardiac dysfunction.  His albumin is grossly low.  Doppler evaluation of liver is unremarkable. Suspect the ascites is multifactorial related to hypoalbuminemia, gross volume overload.  Despite the low SAGG, ascities likely due to right ventricular dysfunction.   -- large volume paracentesis 12/12  -- Negative for SBP  -- repeat SAAG 0.4; ordered cytology on peritoneal fluid, will f/u     Shock    Undifferentiated shock post cardiac arrest.- does not have any precipitating arrhythmia on ICD interrogation. Very poor ejection fraction with evidence of right heart failure.  Clinically with coarse volume overload.  He has known wound infection demonstrating Acinetobacter and has been on ongoing meropenem.  Shock appears multifactorial in likely secondary to cardiac decompensation and sedative infusions to facilitate mechanical ventilation. Now off sedating medications.   -- Wean norepinephrine to maintain map greater than 65.  requirements are downtrending  in the last 24 hr.  -- continue with diuresis to optimize cardiac function. Continues to respond appropriately.  -- meropenem for known wound infection.  Otherwise he has been afebrile and no additional clear source of secondary infection noted. ID broadened to vancomycin and meropenem.  -- Cultures with no growth     Acute hypoxemic respiratory failure    Respiratory failure multifactorial.  Likely secondary to cardiac arrest and ongoing cardiac decompensation and gross volume overload with impaired diaphragmatic excursion secondary to tense abdominal ascites.  Has bilateral pleural effusions with significant ascites noted. Suspect that the pleural effusions are transudative and reflect ascitic fluid crossing the diaphragms.    -- continue with diuresis as tolerated  -- follow up paracentesis fluid labs and cytology  -- wean supplemental O2 to maintain SpO2 greater than 88%  -- Passed SAT/SBT this AM and was extubated to BIPAP.     Foot infection    Secondary to DM/PVD- s/p revascularization on 11/13/2018 including a left SFA, AT and peroneal angioplasty to improve perfusion to his left foot due to extensive left foot   Wound. Wound again debrided by vascular on November 29th and 12/5.  Cultures with Acinetobacter.  Continue IV Meropenem and vancomycin for 6 weeks per ID.      DM type 2 with diabetic peripheral neuropathy    Hemoglobin A1c- 8.  Sliding scale insulin.  Target glucose 140-180       Discussed with Dr. Yee.     Critical Care Time: 50 minutes  Critical care was time spent personally by me on the following activities: evaluating this patient's organ dysfunction, development of treatment plan, discussing treatment plan with patient or surrogate and bedside caregivers, discussions with consultants, evaluation of patient's response to treatment, examination of patient, ordering and performing treatments and interventions, ordering and review of laboratory studies, ordering and review of radiographic  studies, re-evaluation of patient's condition. This critical care time did not overlap with that of any other provider or involve time for any procedures.       Nichelle Leonardo PA-C  Pulmonology  [facility] [place]

## 2018-12-13 NOTE — PROGRESS NOTES
[facility] Hospital Medicine  Progress Note    Patient Name: Marvin Ray  MRN: [id number]  Patient Class: IP- Inpatient   Admission Date: 12/10/2018  Length of Stay: 2 days  Attending Physician: Jazz Sotomayor MD  Primary Care Provider: Rubén Davis MD        Subjective:     Principal Problem:Cardiac arrest    HPI:  58 y.o. male with DM2, HLD, HTN, CAD, chronic combined heart failure, CKD stage III presents with a complaint of left foot wound infection.  Prior cultures show only sensitive to meropenem.  Outpatient IV antibiotics were attempted to be setup last night and throughout the day today through his PCP but were unable to be procured.  He denies fever, chills, cough, SOB, chest pain, palpitations, headaches, vision changes, N/V/D, abdominal pain, or dysuria.  Placed in observation for PICC line placement and to setup home IV abx.    Hospital Course:  Patient admitted for PICC line placement for IV antibiotics for left wound infection -12/5/18 wound culture Acinetobacter baumannii/haemolyticus sensitive to meropenem. IV meropenum started in ED. On 12/11/18, patient cardiac arrest with ROSC after ACLS. Per sister, he was standing getting ready to leave the hospital and then fell back into the bed. Patient intubated on 12/11 and transferred to ICU. Narcan given prior to transfer to ICU.   Sedated with propofol. Hypotensive after arrest requiring levophed that has been weaned. Cardiology and Pulmonary consulted. ICD interrogated- no events, no shocks. Possibly PEA arrest due to severe infection with underlying biventricular failure and severe CAD? TTE with EF 30%, diastolic dysfunction, new RV changes. CT negative for PE. Troponins elevated at 0.5 but downtrended. Initially started asa, plavix, and full dose lovenox with concern for NSTEMI, but unlikely NSTEMI given minimal troponin elevations that could be explained by chest compressions alone; lovenox discontinued. Will continue DAPT  given severe CAD with no intervention (not a candidate for PCI or CABG given anatomy). Blood cultures checked and NGTD. Wound care following for DM foot wounds. Abdominal US with ascites s/p paracentesis with 2.4L of clear light green ascites removed on 12/12. SAAG 0.6 which is NOT consistent with portal hypertension. TP 4.2 which would be consistent with cardiac etiology. No SBP. No pancreatitis on CT abd/pelvis and none clinically either. UA and UPC pending to screen for nephrotic syndrome as cause of ascites and anasarca. Cytology and AFB culture and smear on ascitic fluid pending. Patient extubated to BiPAP on 12/13. Still requiring low dose levophed.     Interval History: Examined prior to extubation. Following commands.      Review of Systems   Unable to perform ROS: Intubated     Objective:     Vital Signs (Most Recent):  Temp: 98.1 °F (36.7 °C) (12/13/18 1101)  Pulse: 85 (12/13/18 1245)  Resp: (!) 21 (12/13/18 1245)  BP: (!) 108/59 (12/13/18 1100)  SpO2: 98 % (12/13/18 1245) Vital Signs (24h Range):  Temp:  [97.2 °F (36.2 °C)-98.5 °F (36.9 °C)] 98.1 °F (36.7 °C)  Pulse:  [69-86] 85  Resp:  [8-33] 21  SpO2:  [94 %-100 %] 98 %  BP: (108-153)/(59-78) 108/59  Arterial Line BP: ()/() 118/52     Weight: 99.8 kg (220 lb 0.3 oz)  Body mass index is 31.57 kg/m².    Intake/Output Summary (Last 24 hours) at 12/13/2018 1247  Last data filed at 12/13/2018 0600  Gross per 24 hour   Intake 1539.28 ml   Output 3220 ml   Net -1680.72 ml      Physical Exam   Constitutional: He appears well-developed and well-nourished. No distress.   HENT:   Head: Normocephalic and atraumatic.   Mouth/Throat: Oropharynx is clear and moist.   ETT and OGT in place   Cardiovascular: Normal rate, regular rhythm, normal heart sounds and intact distal pulses. Exam reveals no gallop and no friction rub.   No murmur heard.  Pulmonary/Chest: Effort normal and breath sounds normal. No stridor. No respiratory distress. He has no wheezes. He  has no rales.   Mechanical ventilation   Abdominal: Bowel sounds are normal. He exhibits distension (improved). He exhibits no mass. There is no tenderness. There is no guarding.   Bandage in RLQ dry and intact   Musculoskeletal: He exhibits edema.   Neurological:   Follows commands   Skin: He is not diaphoretic.   Bilateral feet dressed   Nursing note and vitals reviewed.      Significant Labs: All pertinent labs within the past 24 hours have been reviewed.    Significant Imaging: I have reviewed and interpreted all pertinent imaging results/findings within the past 24 hours.    Assessment/Plan:      * Cardiac arrest    On 12/11  PEA? ICD interrogated with no shocks or abnormal rhythms.  Etiology unclear. No PE, no NSTEMI, no electrolyte abnormalities, no hypoxia, no toxins  May be PEA arrest due to severe infection in setting of biventricular cardiomyopathy and severe CAD       Acute renal insufficiency    Due to cardiac arrest. Cr up to 1.4 from baseline ~0.8. Now resolved.        Severe malnutrition    Encourage diet when able       Other ascites    Abdominal US with ascites  Previous paracentesis in 10/2018 with SAAG 0.9, not consistent with portal hypertension  Repeat paracentesis 12/12 with light green clear fluid. SAAG 0.6. Total protein 4.2.  with 196 PMNs, not consistent with SBP.   Ascites cytology and AFB culture and smear ordered  UA and UPC ordered to rule out nephrotic syndrome as cause of ascites and pleural effusions  Not clinically or radiologically consistent with pancreatitis  GI consulted in 10/2018 with prior paracentesis and suggested it could be due to CHF. Total protein is consistent with this but SAAG is not       Elevated troponin    Troponin elevated to 0.5 max with new TWIs  Initially started NSTEMI protocol on 12/11 with concern for possible NSTEMI as cause of cardiac arrest  Troponins downtrended  Discontinued weight based lovenox on 12/12 after discussion with Cardiology        Shock    Hypotensive after code on 12/11  On levophed, requirements decreasing  Exact cause of shock unclear       Acute hypoxemic respiratory failure    Intubated 12/11 post code  Ascites and bilateral effusions cause decreased lung compliance-- paracentesis 12/12 with 2.4L off  Extubated to BiPAP on 12/13       Diabetic foot infection    Tissue culture obtained by Vascular Surgery obtained 12/5/18 grew Acinetobacter baumannii/haemolyticus sensitive to meropenem.    ID consulted- recommend meropenem and vanc x6 weeks   PICC in place R arm for outpatient antibiotics  Wound care following  ESR 72, CRP 89 on 12/11- trend weekly  Poor wound healing likely given PAD and DM with extensive wound  Will need ID follow up     Chronic combined systolic and diastolic heart failure    No evidence of decompensation on admit  Post code BNP elevated, though this is expected  Patient has ascites and bilateral pleural effusions. Does not have significant pulmonary edema  Will change lasix to 40mg IV daily- patient has good UOP and improving Cr with this regimen  Likely will need to add spironolactone for ascites and CHF when tolerated by BP  Monitor UOP    TTE 12/11  · Severely decreased left ventricular systolic function. The estimated ejection fraction is 20%  · Severe global hypokinetic wall motion. Cannot exclude RWMA.  · Septal wall has abnormal motion. Systolic and diastolic flattening of the interventricular septum consistent with right ventricle pressure and volume overload.  · Left ventricular diastolic dysfunction.  · Mild right ventricular enlargement.  · Mildly to moderately reduced right ventricular systolic function.  · Moderate tricuspid regurgitation.  · There is a large left pleural effusion.     LAYNE (acute kidney injury)    Baseline Cr ~0.9  Cr increased to 1.4 post code, now improved back to baseline  Continue to monitor  Renal dose all meds, avoid nephrotoxins      PVD (peripheral vascular disease)    Continue  asa, plavix, lipid reduction meds       CAD (coronary artery disease)    Patient has severe 3v disease per [facility] in 2016 with no targets for PCI or CABG  Continue asa. Add plavix (discussed with Juan Antonio). Continue lipid management. Hold BB and ACEi for hypotension  With elevated troponin- see elsewhere     Essential hypertension    Currently hypotensive on levophed  Hold all BP meds     Tophaceous gout    Continue home allopurinol       Neovascular glaucoma of both eyes    Continue eye drops       HLD (hyperlipidemia)    Last lipids 10/2018 are well controlled  Continue home regimen of ezetimibe and fish oil (statin intolerance)     DM type 2 with diabetic peripheral neuropathy    Last HgbA1c   Lab Results   Component Value Date    HGBA1C 8.3 (H) 10/30/2018   Hold oral antihyperglycemics while inpatient    Glucose controlled on SSI alone  NPO, Q6 accuchecks, SSI         VTE Risk Mitigation (From admission, onward)        Ordered     enoxaparin injection 40 mg  Daily      12/12/18 0820     IP VTE HIGH RISK PATIENT  Once      12/10/18 2126          Critical care time spent on the evaluation and treatment of severe organ dysfunction, review of pertinent labs and imaging studies, discussions with consulting providers and discussions with patient/family: 60 minutes.    Jazz Sotomayor MD  Department of Hospital Medicine   [facility]

## 2018-12-13 NOTE — ASSESSMENT & PLAN NOTE
No evidence of decompensation on admit  Post code BNP elevated, though this is expected  Patient has ascites and bilateral pleural effusions. Does not have significant pulmonary edema  Will change lasix to 40mg IV daily- patient has good UOP and improving Cr with this regimen  Likely will need to add spironolactone for ascites and CHF when tolerated by BP  Monitor UOP    TTE 12/11  · Severely decreased left ventricular systolic function. The estimated ejection fraction is 20%  · Severe global hypokinetic wall motion. Cannot exclude RWMA.  · Septal wall has abnormal motion. Systolic and diastolic flattening of the interventricular septum consistent with right ventricle pressure and volume overload.  · Left ventricular diastolic dysfunction.  · Mild right ventricular enlargement.  · Mildly to moderately reduced right ventricular systolic function.  · Moderate tricuspid regurgitation.  · There is a large left pleural effusion.

## 2018-12-13 NOTE — ASSESSMENT & PLAN NOTE
2/2 cardiac arrest.   --lung protective ventilation at 6 cc/kh Vt  --weaning O2 to sats > 88%  --SAT/SBT today with likely extubation.

## 2018-12-13 NOTE — ASSESSMENT & PLAN NOTE
On 12/11  PEA? ICD interrogated with no shocks or abnormal rhythms.  Etiology unclear. No PE, no NSTEMI, no electrolyte abnormalities, no hypoxia, no toxins  May be PEA arrest due to severe infection in setting of biventricular cardiomyopathy and severe CAD

## 2018-12-13 NOTE — SUBJECTIVE & OBJECTIVE
Interval History: Examined prior to extubation. Following commands.      Review of Systems   Unable to perform ROS: Intubated     Objective:     Vital Signs (Most Recent):  Temp: 98.1 °F (36.7 °C) (12/13/18 1101)  Pulse: 85 (12/13/18 1245)  Resp: (!) 21 (12/13/18 1245)  BP: (!) 108/59 (12/13/18 1100)  SpO2: 98 % (12/13/18 1245) Vital Signs (24h Range):  Temp:  [97.2 °F (36.2 °C)-98.5 °F (36.9 °C)] 98.1 °F (36.7 °C)  Pulse:  [69-86] 85  Resp:  [8-33] 21  SpO2:  [94 %-100 %] 98 %  BP: (108-153)/(59-78) 108/59  Arterial Line BP: ()/() 118/52     Weight: 99.8 kg (220 lb 0.3 oz)  Body mass index is 31.57 kg/m².    Intake/Output Summary (Last 24 hours) at 12/13/2018 1247  Last data filed at 12/13/2018 0600  Gross per 24 hour   Intake 1539.28 ml   Output 3220 ml   Net -1680.72 ml      Physical Exam   Constitutional: He appears well-developed and well-nourished. No distress.   HENT:   Head: Normocephalic and atraumatic.   Mouth/Throat: Oropharynx is clear and moist.   ETT and OGT in place   Cardiovascular: Normal rate, regular rhythm, normal heart sounds and intact distal pulses. Exam reveals no gallop and no friction rub.   No murmur heard.  Pulmonary/Chest: Effort normal and breath sounds normal. No stridor. No respiratory distress. He has no wheezes. He has no rales.   Mechanical ventilation   Abdominal: Bowel sounds are normal. He exhibits distension (improved). He exhibits no mass. There is no tenderness. There is no guarding.   Bandage in RLQ dry and intact   Musculoskeletal: He exhibits edema.   Neurological:   Follows commands   Skin: He is not diaphoretic.   Bilateral feet dressed   Nursing note and vitals reviewed.      Significant Labs: All pertinent labs within the past 24 hours have been reviewed.    Significant Imaging: I have reviewed and interpreted all pertinent imaging results/findings within the past 24 hours.

## 2018-12-13 NOTE — ASSESSMENT & PLAN NOTE
Baseline Cr ~0.9  Cr increased to 1.4 post code, now improved back to baseline  Continue to monitor  Renal dose all meds, avoid nephrotoxins

## 2018-12-13 NOTE — SUBJECTIVE & OBJECTIVE
Past Medical History:   Diagnosis Date    Arthritis     Cellulitis     CKD (chronic kidney disease), stage III     Coronary artery disease     Diabetes mellitus     Diabetic retinopathy     Diabetic ulcer of left foot     Glaucoma     Gout     Hyperlipemia     Hypertension     ICD (implantable cardioverter-defibrillator) in place 11/02/2018    Left chest    Non-pressure chronic ulcer of other part of left foot with fat layer exposed 10/23/2018    PVD (peripheral vascular disease)     Type 2 diabetes mellitus with left diabetic foot ulcer 10/29/2018    Unsteady gait     uses a wheelchair       Past Surgical History:   Procedure Laterality Date    AHMED GLAUCOMA IMPLANT Left 2011    DONE AT [facility]    AMPUTATION, TOE Left 12/5/2018    Performed by Mitch Chan MD at [facility] OR    AMPUTATION, TOES  2-5 Left 11/16/2018    Performed by Mitch Chan MD at [facility] OR    BAERVELDT GLAUCOMA IMPLANT Left 2012    WITH CATARACT EXTRACTION//DONE AT [facility]    CARDIAC CATHETERIZATION Left 05/2016    CARDIAC DEFIBRILLATOR PLACEMENT Left 11/02/2018    CATARACT EXTRACTION W/  INTRAOCULAR LENS IMPLANT Left 2012    WITH BAERVEDT//DONE AT [facility]    CATARACT EXTRACTION W/  INTRAOCULAR LENS IMPLANT Right 09/26/2018    COMPLEX ()    CB DESTRUCTION WITH CYCLO G6 Left 02/15/2017        CYST REMOVAL      Exam under anesthesia, left foot debridement, washout and all other indicated procedures Left 12/5/2018    Performed by Mitch Chan MD at [facility] OR    EXAMINATION UNDER ANESTHESIA Left 12/5/2018    Procedure: Exam under anesthesia, left foot debridement, washout and all other indicated procedures;  Surgeon: Mitch Chan MD;  Location: [facility] OR;  Service: Vascular;  Laterality: Left;  1030AM START  RN PREOP 12/3/2018-----AICD  SEEN BY DR JAMES 12/4    HEART CATH-LEFT N/A 5/6/2016    Performed by Mike Magana [facility] at [facility] CATH LAB    INCISION AND DRAINAGE Left  11/14/2018    Procedure: Incision and Drainage, left lower extremity debridement, washout;  Surgeon: Mitch Chan MD;  Location: [facility] OR;  Service: Vascular;  Laterality: Left;    INCISION AND DRAINAGE Left 11/16/2018    Procedure: INCISION AND DRAINAGE;  Surgeon: Mitch Chan MD;  Location: [facility] OR;  Service: Vascular;  Laterality: Left;    INCISION AND DRAINAGE Left 11/16/2018    Performed by Mitch Chan MD at [facility] OR    Incision and Drainage, left lower extremity debridement, washout Left 11/14/2018    Performed by Mitch Chan MD at [facility] OR    INSERTION, ICD GENERATOR, SINGLE CHAMBER N/A 11/2/2018    Performed by Pernell Greer MD at [facility] CATH LAB    INSERTION, IOL PROSTHESIS Right 9/26/2018    Performed by Perla Cortés MD at [facility] OR [address] [place] [address]    INTRAOCULAR PROSTHESES INSERTION Right 9/26/2018    Procedure: INSERTION, IOL PROSTHESIS;  Surgeon: Perla Cortés MD;  Location: [facility] OR [address] [place] [address];  Service: Ophthalmology;  Laterality: Right;    PHACOEMULSIFICATION OF CATARACT Right 9/26/2018    Procedure: PHACOEMULSIFICATION, CATARACT;  Surgeon: Perla Cortés MD;  Location: [facility] OR [address] [place] [address];  Service: Ophthalmology;  Laterality: Right;    PHACOEMULSIFICATION, CATARACT Right 9/26/2018    Performed by Perla Cortés MD at [facility] OR [address] [place] [address]    Right foot surgery  10/2014    TOE AMPUTATION Right     first and second    TOE AMPUTATION Left 11/16/2018    Procedure: AMPUTATION, TOES  2-5;  Surgeon: Mitch Chan MD;  Location: [facility] OR;  Service: Vascular;  Laterality: Left;    TOE AMPUTATION Left 12/5/2018    Procedure: AMPUTATION, TOE;  Surgeon: Mitch Chan MD;  Location: [facility] OR;  Service: Vascular;  Laterality: Left;  left great toe    TONSILLECTOMY      TRABECULECTOMY/G 6 LASER Left 2/15/2017    Performed by Perla Cortés MD at [facility] OR [address] [place] [address]       Review of patient's allergies indicates:   Allergen Reactions     "Statins-hmg-coa reductase inhibitors      Generalized Pain    Onglyza [saxagliptin]     Penicillins Rash       No current facility-administered medications on file prior to encounter.      Current Outpatient Medications on File Prior to Encounter   Medication Sig    allopurinol (ZYLOPRIM) 100 MG tablet Take 2 tablets (200 mg total) by mouth once daily.    amLODIPine (NORVASC) 5 MG tablet TAKE 1 TABLET(5 MG) BY MOUTH EVERY DAY    aspirin 81 MG Chew Take 81 mg by mouth once daily.    carvedilol (COREG) 6.25 MG tablet Take 1 tablet (6.25 mg total) by mouth 2 (two) times daily.    coenzyme Q10 100 mg capsule Take 100 mg by mouth every morning.    collagenase (SANTYL) ointment Apply topically once daily.    dorzolamide-timolol 2-0.5% (COSOPT) 22.3-6.8 mg/mL ophthalmic solution Place 1 drop into both eyes 3 (three) times daily.    ezetimibe (ZETIA) 10 mg tablet Take 1 tablet (10 mg total) by mouth once daily.    fish oil-omega-3 fatty acids 300-1,000 mg capsule Take 2 capsules (2 g total) by mouth 2 (two) times daily. (Patient taking differently: Take 1 g by mouth 2 (two) times daily. )    insulin glargine-lixisenatide (SOLIQUA 100/33) 100 unit-33 mcg/mL InPn Inject 34 units once daily    ketorolac 0.5% (ACULAR) 0.5 % Drop Place 1 drop into the right eye 3 (three) times daily.    MULTIVIT,THER IRON,CA,FA & MIN (MULTIVITAMIN) Tab Take 1 tablet by mouth every morning.     oxyCODONE-acetaminophen (PERCOCET) 5-325 mg per tablet Take 1 tablet by mouth every 6 (six) hours as needed for Pain.    pen needle, diabetic 31 gauge x 1/4" Ndle Use as directed     Family History     Problem Relation (Age of Onset)    Diabetes Mother    Heart disease Brother    No Known Problems Father, Sister, Maternal Aunt, Maternal Uncle, Paternal Aunt, Paternal Uncle, Maternal Grandmother, Maternal Grandfather, Paternal Grandmother, Paternal Grandfather        Tobacco Use    Smoking status: Former Smoker     Packs/day: 1.00     " Years: 3.00     Pack years: 3.00     Types: Cigarettes     Last attempt to quit: 1984     Years since quittin.4    Smokeless tobacco: Never Used   Substance and Sexual Activity    Alcohol use: Yes     Alcohol/week: 0.6 oz     Types: 1 Cans of beer per week     Comment: Occasional    Drug use: No    Sexual activity: Not Currently     Review of Systems   Unable to perform ROS: intubated     Objective:     Vital Signs (Most Recent):  Temp: 98.5 °F (36.9 °C) (18 0315)  Pulse: 74 (18 0630)  Resp: 16 (18 06)  BP: 117/61 (18 0600)  SpO2: 99 % (18) Vital Signs (24h Range):  Temp:  [97.2 °F (36.2 °C)-98.5 °F (36.9 °C)] 98.5 °F (36.9 °C)  Pulse:  [69-86] 74  Resp:  [8-33] 16  SpO2:  [94 %-100 %] 99 %  BP: (116-139)/(59-71) 117/61  Arterial Line BP: ()/() 117/46     Weight: 99.8 kg (220 lb 0.3 oz)  Body mass index is 31.57 kg/m².    SpO2: 99 %  O2 Device (Oxygen Therapy): ventilator      Intake/Output Summary (Last 24 hours) at 2018 0952  Last data filed at 2018 0600  Gross per 24 hour   Intake 1667.98 ml   Output 4570 ml   Net -2902.02 ml       Lines/Drains/Airways     Peripherally Inserted Central Catheter Line                 PICC Double Lumen 12/10/18 2241 right basilic 2 days          Drain                 Open Drain 18 1353 Left Foot Other (see comments) Other (see comments) 28 days         NG/OG Tube 18 1350 orogastric 7.5 Fr. Center mouth 1 day         Urethral Catheter 18 1351 Double-lumen 16 Fr. 1 day          Airway                 Airway - Non-Surgical 18 0815 Other (Comment) 27 days         Airway - Non-Surgical 18 1320 Endotracheal Tube 1 day          Arterial Line                 Arterial Line 18 1530 Left Radial 1 day                Physical Exam   Constitutional: He appears well-developed and well-nourished.   HENT:   Head: Normocephalic and atraumatic.   Eyes: No scleral icterus.   Neck: No JVD  present. No thyromegaly present.   Cardiovascular: Normal rate, regular rhythm, S1 normal and S2 normal. Exam reveals distant heart sounds.   Pulmonary/Chest: Effort normal and breath sounds normal. No respiratory distress.   intub and sedated   Abdominal: Soft. He exhibits no distension.   Musculoskeletal: He exhibits no edema.   Neurological:   UTO   Skin: Skin is warm and dry.   Psychiatric:   UTO       Current Medications:   allopurinol  200 mg Oral Daily    aspirin  81 mg Oral Daily    brimonidine 0.1%  1 drop Both Eyes BID    chlorhexidine  15 mL Mouth/Throat BID    clopidogrel  75 mg Oral Daily    collagenase   Topical (Top) Daily    enoxaparin  40 mg Subcutaneous Daily    ezetimibe  10 mg Oral Daily    famotidine (PF)  20 mg Intravenous BID    furosemide  40 mg Intravenous Daily    meropenem (MERREM) IVPB  2 g Intravenous Q8H    miconazole NITRATE 2 %   Topical (Top) BID    omega 3-dha-epa-fish oil  1 capsule Oral BID    prednisoLONE acetate  1 drop Right Eye Daily    sodium chloride 0.9%  10 mL Intravenous Q6H    vancomycin (VANCOCIN) IVPB  1,500 mg Intravenous Q12H      norepinephrine bitartrate-D5W 0.04 mcg/kg/min (12/13/18 0908)    propofol Stopped (12/13/18 0928)     acetaminophen, dextrose 50%, dextrose 50%, fentaNYL, glucagon (human recombinant), glucose, glucose, insulin aspart U-100, ondansetron, oxyCODONE-acetaminophen, ramelteon, Flushing PICC Protocol **AND** sodium chloride 0.9% **AND** sodium chloride 0.9%    Laboratory:  CBC:  Recent Labs   Lab 12/10/18  1718 12/11/18  0604 12/11/18  1406 12/12/18  0226 12/13/18  0230   WHITE BLOOD CELL COUNT 7.18 8.68 8.50 7.80 8.80   HEMOGLOBIN 9.2 L 8.5 L 8.6 L 9.1 L 9.5 L   HEMATOCRIT 28.2 L 26.6 L 26.2 L 27.6 L 28.7 L   PLATELETS 328 327 297 343 346       CHEMISTRIES:  Recent Labs   Lab 05/06/16  0548 05/07/16  0441 05/08/16  0606  12/03/18  1129  12/10/18  1718 12/11/18  0604 12/11/18  1406 12/12/18  0226 12/13/18  0230   GLUCOSE 247 H  123 H 107   < > 122 H   < > 74 76 132 H 153 H 185 H   SODIUM 139 140 140   < > 138   < > 139 139 137 139 141   POTASSIUM 5.0 4.6 4.5   < > 4.1   < > 4.0 4.1 4.4 3.8 3.8   BUN BLD 23 H 19 13   < > 59 H   < > 56 H 58 H 60 H 50 H 29 H   CREATININE 1.0 0.8 0.8   < > 1.1   < > 1.1 1.2 1.4 1.1 0.8   EGFR IF AFRICAN AMERICAN >60.0 >60.0 >60.0   < > >60   < > >60 >60 >60 >60 >60   EGFR IF NON- >60.0 >60.0 >60.0   < > >60   < > >60 >60 54 A >60 >60   CALCIUM 9.4 9.0 9.1   < > 8.6 L   < > 8.7 8.5 L 8.2 L 8.6 L 8.7   MAGNESIUM 1.8 1.8 1.6  --  1.8  --   --   --  1.8  --   --     < > = values in this interval not displayed.       CARDIAC BIOMARKERS:  Recent Labs   Lab 05/05/16  2351 05/06/16  0548 10/12/16  1227 12/11/18  1406 12/11/18  2024 12/12/18  0226   CPK  --   --  93  --   --   --    TROPONIN I <0.006 0.013  --  0.532 H 0.449 H 0.338 H       COAGS:  Recent Labs   Lab 10/14/18  1059 10/30/18  0955 11/08/18  1154 12/03/18  1130 12/10/18  1718   INR 1.2 1.1 1.1 1.2 1.1       LIPIDS/LFTS:  Recent Labs   Lab 01/08/16  0952  05/08/16  0606 10/12/16  1227 07/27/18  0820  10/30/18  0955  12/09/18  1905 12/11/18  0604 12/11/18  1406 12/12/18  0226 12/13/18  0230   CHOLESTEROL 201 H  --  168 174 151  --  107 L  --   --   --   --   --   --    TRIGLYCERIDES 136  --  97 80 83  --  44  --   --   --   --   --   --    HDL 33 L  --  28 L 35 L 29 L  --  27 L  --   --   --   --   --   --    LDL CHOLESTEROL 140.8  --  120.6 123.0 105.4  --  71.2  --   --   --   --   --   --    NON-HDL CHOLESTEROL 168  --  140 139 122  --  80  --   --   --   --   --   --    AST 30   < > 9 L 16 27   < >  --    < > 21 20 29 20 14   ALT 12   < > 9 L 15 28   < >  --    < > 18 16 21 19 14    < > = values in this interval not displayed.       BNP:  Recent Labs   Lab 05/05/16  2351 12/11/18  1406    H 2,503 H       TSH:        Free T4:        Diagnostic Results:  ECG (personally reviewed tracings):  12/11/18 1359 SR 78, PRWP, NSSTTW changes,  similar to 11/9/18    Chest X-Ray (personally reviewed image(s)): 12/11/18  1. Interval development of an air density curvilinear airspace opacity subjacent to the right hemidiaphragm suggest interval development of pneumoperitoneum.  Surgical consult/evaluation is indicated.  2.  Interval placement of an endotracheal tube in good position.  Remaining lines and leads are stable.  No pneumothorax.  3.  Bilateral perihilar and bibasilar airspace opacities likely reflect atelectasis however, superimposed infection cannot be excluded.    LE venous US 12/11/18  No evidence of deep venous thrombosis in either lower extremity.    Echo: 12/11/18 (images pers rev, similar EF to report 10/16/18)  · Severely decreased left ventricular systolic function. The estimated ejection fraction is 20%  · Severe global hypokinetic wall motion. Cannot exclude RWMA.  · Septal wall has abnormal motion. Systolic and diastolic flattening of the interventricular septum consistent with right ventricle pressure and volume overload.  · Left ventricular diastolic dysfunction.  · Mild right ventricular enlargement.  · Mildly to moderately reduced right ventricular systolic function.  · Moderate tricuspid regurgitation.  · There is a large left pleural effusion.    Stress Test: 10/17/18  · Abnormal myocardial perfusion study  · There is a moderate amount of infarct in the inferior wall(s).In the typical distribution of the RCA territory.  · Post Stress Ejection Fraction is 17 %    Cath: 5/6/16  B. Summary/Post-Operative Diagnosis    Three vessel coronary artery disease.    LV systolic and diastolic dysfunction.  D. Hemodynamic Results  LVEDP (Pre): 25 mmHg  LVEDP (Post): 30 mmHg  Ejection Fraction: 35%  E. Angiographic Results       Patient has a right dominant coronary artery.      - Left Main Coronary Artery:             The LM has luminal irregularities. There is BAKARI 3 flow.     - Left Anterior Descending Artery:             The proximal LAD has  a 80% stenosis. There is BAKARI 3 flow.             The mid LAD has a 90% stenosis. There is BAKARI 3 flow. The remaining portion of the vessel is diffusely diseased.     - Left Circumflex Artery:             The mid LCX has a 60% stenosis. There is BAKARI 3 flow. The remaining portion of the vessel has luminal irregularities.     - Right Coronary Artery:             The proximal RCA has a 60% stenosis. There is BAKARI 3 flow.             The mid RCA has a 80% stenosis. There is BAKARI 3 flow. The remaining portion of the vessel has luminal irregularities.     - Posterior Descending Artery:             The proximal PDA has a 60% stenosis. There is BAKARI 3 flow. The remaining portion of the vessel has luminal irregularities.

## 2018-12-13 NOTE — HPI
Mr. Ray iss a 57 y/o male who was admitted on 12/10/18 for a left foot infection (cultures have grown Acenitobacter and E faecalis). Attempts were made to place a PICC and start meropenem as an outpatient, but he was not able to have the PICC placed until Wednesday so he presented to the ED. He was admitted to Observation and a PICC was placed. Shortly before discharge, Mr. Ray was noted to be in cardiac arrest. There were no clear precipitating events and he was not on telemetry (although he does have an AICD in place). The code lasted ~5 minutes with ROSC after 1 epinephrine. He was subsequently intubated and transferred to the ICU.

## 2018-12-13 NOTE — ASSESSMENT & PLAN NOTE
Unclear precipitant, ?PEA d/t underlying severe comorbidities on background of severe CAD/CMP.  Minimal flat trop elev, doubt ACS.  Not on tele when this occurred, but has ICD in place  No immediate premorbid anginal sxs  Currently in SR  [facility] [place] PPM interrogated 12/11/18, no tachy events.  Note made of episodic pacing at 40BPM, but unclear exact timing.  This does not appear to be an arrhythmic syncopal event/cardiac arrest based on these findings.

## 2018-12-13 NOTE — ASSESSMENT & PLAN NOTE
No evidence to suggest hepatic dysfunction.  Previous paracentesis demonstrated SAAG=0.9, which is not consistent with portal hypertension or ascites secondary to cardiac dysfunction.  His albumin is grossly low.  Doppler evaluation of liver is unremarkable. Suspect the ascites is multifactorial related to hypoalbuminemia, gross volume overload.  Despite the low SAGG, ascities likely due to right ventricular dysfunction.   -- large volume paracentesis 12/12  -- Negative for SBP  -- repeat SAAG 0.4; ordered cytology on peritoneal fluid, will f/u

## 2018-12-13 NOTE — ASSESSMENT & PLAN NOTE
Risk factor  = severe PVD and uncontrolled DM. Almost certainly has osteomyelitis based on clinical appearance and growth on bone culture. Cultures consistently grow Acinetobacter baumannii/haemolyticus and group G strep has also grown from bone culture (only treated for 7d with iv abx for a cellulitis/SSTI before). Has also grown finegoldia and enterococcus in past. Doesn't appear that the digits growing these organisms have been amputated. Agree with meropenem. Would also add vancomycin for broader coverage of these organisms that have grown with attempt to avoid double b-lactam regimen. Duration likely 6 weeks, but will depend on clinical response. Trend weekly esr, crp, bmp, vanc trough (goal 15-20). Poor vascular supply and poor dm control expected to impede wound healing.

## 2018-12-13 NOTE — PROGRESS NOTES
Results for MELONY DASILVA (MRN [id number]) as of 12/13/2018 04:43   Ref. Range 12/13/2018 04:20   POC PH Latest Ref Range: 7.35 - 7.45  7.435   POC PCO2 Latest Ref Range: 35 - 45 mmHg 39.8   POC PO2 Latest Ref Range: 80 - 100 mmHg 144 (H)   POC BE Latest Ref Range: -2 to 2 mmol/L 2   POC HCO3 Latest Ref Range: 24 - 28 mmol/L 26.7   POC SATURATED O2 Latest Ref Range: 95 - 100 % 99   POC TCO2 Latest Ref Range: 23 - 27 mmol/L 28 (H)   FiO2 Unknown 40   Vt Unknown 440   PiP Unknown 19   PEEP Unknown 8   Sample Unknown ARTERIAL   DelSys Unknown Adult Vent   Allens Test Unknown N/A   Site Unknown Stephanie/UAC   Mode Unknown AC/PRVC   Rate Unknown 16

## 2018-12-13 NOTE — ASSESSMENT & PLAN NOTE
Unclear etiology.  ICD interrogation does not reveal a primary arrhythmia.  Troponin curve is otherwise flat not suggestive of ischemia as etiology.  No pulmonary embolism or DVT found on investigation.  I suspect likely related to hypoxemia in the setting of right ventricular failure and use of sedating medications. TTM not intiated due to patients neurologic response s/p cardiac arrest.  -- continue with supportive care and optimization of cardiac function  -- Discontinued propofol this AM; neurologic status improved

## 2018-12-13 NOTE — ASSESSMENT & PLAN NOTE
Abdominal US with ascites  Previous paracentesis in 10/2018 with SAAG 0.9, not consistent with portal hypertension  Repeat paracentesis 12/12 with light green clear fluid. SAAG 0.6. Total protein 4.2.  with 196 PMNs, not consistent with SBP.   Ascites cytology and AFB culture and smear ordered  UA and UPC ordered to rule out nephrotic syndrome as cause of ascites and pleural effusions  Not clinically or radiologically consistent with pancreatitis  GI consulted in 10/2018 with prior paracentesis and suggested it could be due to CHF. Total protein is consistent with this but SAAG is not

## 2018-12-13 NOTE — ASSESSMENT & PLAN NOTE
Undifferentiated shock post cardiac arrest.- does not have any precipitating arrhythmia on ICD interrogation. Very poor ejection fraction with evidence of right heart failure.  Clinically with coarse volume overload.  He has known wound infection demonstrating Acinetobacter and has been on ongoing meropenem.  Shock appears multifactorial in likely secondary to cardiac decompensation and sedative infusions to facilitate mechanical ventilation. Now off sedating medications.   -- Wean norepinephrine to maintain map greater than 65.  requirements are downtrending in the last 24 hr.  -- continue with diuresis to optimize cardiac function. Continues to respond appropriately.  -- meropenem for known wound infection.  Otherwise he has been afebrile and no additional clear source of secondary infection noted. ID broadened to vancomycin and meropenem.  -- Cultures with no growth

## 2018-12-13 NOTE — CODE DOCUMENTATION
1313  Arrived in patient room on 3W.  CPR in progress.  Intubated per ER MD with return of spontaneous circulation.  1330  Placed on Vent in ICU.  Patient moving all extremeties.  Multilple MDs at bedside.  MD spoke with patient's family.

## 2018-12-13 NOTE — ASSESSMENT & PLAN NOTE
Respiratory failure multifactorial.  Likely secondary to cardiac arrest and ongoing cardiac decompensation and gross volume overload with impaired diaphragmatic excursion secondary to tense abdominal ascites.  Has bilateral pleural effusions with significant ascites noted. Suspect that the pleural effusions are transudative and reflect ascitic fluid crossing the diaphragms.    -- continue with diuresis as tolerated  -- follow up paracentesis fluid labs and cytology  -- wean supplemental O2 to maintain SpO2 greater than 88%  -- Passed SAT/SBT this AM and was extubated to BIPAP.

## 2018-12-13 NOTE — PROGRESS NOTES
[facility]  Cardiology  Progress Note    Patient Name: Marvin Ray  MRN: [id number]  Admission Date: 12/10/2018  Hospital Length of Stay: 2 days  Code Status: Full Code   Attending Physician: Jazz Sotomayor MD   Primary Care Physician: Rubén Davis MD  Expected Discharge Date: 12/11/2018  Principal Problem:Cardiac arrest    Subjective:     Hospital Course:   12/11/18: admitted to ICU after cardiac arrest    Interval Hx: Pt seen in ICU, case d/w RN and mult family at bedside.  Intubated but awake if not completely alert.    [facility] PPM interrogated 12/11/18, no tachy events.  Note made of episodic pacing at 40BPM, but unclear exact timing.  This does not appear to be an arrhythmic syncopal event/cardiac arrest based on these findings.    Tele: SR, 4 beats NSVT (pers rev)      Past Medical History:   Diagnosis Date    Arthritis     Cellulitis     CKD (chronic kidney disease), stage III     Coronary artery disease     Diabetes mellitus     Diabetic retinopathy     Diabetic ulcer of left foot     Glaucoma     Gout     Hyperlipemia     Hypertension     ICD (implantable cardioverter-defibrillator) in place 11/02/2018    Left chest    Non-pressure chronic ulcer of other part of left foot with fat layer exposed 10/23/2018    PVD (peripheral vascular disease)     Type 2 diabetes mellitus with left diabetic foot ulcer 10/29/2018    Unsteady gait     uses a wheelchair       Past Surgical History:   Procedure Laterality Date    AHMED GLAUCOMA IMPLANT Left 2011    DONE AT [facility]    AMPUTATION, TOE Left 12/5/2018    [facility] at [facility] OR    AMPUTATION, TOES  2-5 Left 11/16/2018    [facility] by [facility] at [facility] OR    BAERVELDT GLAUCOMA IMPLANT Left 2012    WITH CATARACT EXTRACTION//DONE AT [facility]    CARDIAC CATHETERIZATION Left 05/2016    CARDIAC DEFIBRILLATOR PLACEMENT Left 11/02/2018    CATARACT EXTRACTION W/  INTRAOCULAR LENS IMPLANT  Left 2012    WITH BAERVEDT//DONE AT [facility]    CATARACT EXTRACTION W/  INTRAOCULAR LENS IMPLANT Right 09/26/2018    COMPLEX ()    CB DESTRUCTION WITH CYCLO G6 Left 02/15/2017        CYST REMOVAL      Exam under anesthesia, left foot debridement, washout and all other indicated procedures Left 12/5/2018    Performed by Mitch Chan MD at [facility] OR    EXAMINATION UNDER ANESTHESIA Left 12/5/2018    Procedure: Exam under anesthesia, left foot debridement, washout and all other indicated procedures;  Surgeon: Mitch Chan MD;  Location: [facility] OR;  Service: Vascular;  Laterality: Left;  1030AM START  RN PREOP 12/3/2018-----AICD  SEEN BY DR GREER 12/4    HEART CATH-LEFT N/A 5/6/2016    Performed by Mike Magana MD at [facility] CATH LAB    INCISION AND DRAINAGE Left 11/14/2018    Procedure: Incision and Drainage, left lower extremity debridement, washout;  Surgeon: Mitch Chan MD;  Location: [facility] OR;  Service: Vascular;  Laterality: Left;    INCISION AND DRAINAGE Left 11/16/2018    Procedure: INCISION AND DRAINAGE;  Surgeon: Mitch Chan MD;  Location: [facility] OR;  Service: Vascular;  Laterality: Left;    INCISION AND DRAINAGE Left 11/16/2018    Performed by Mitch Chan MD at [facility] OR    Incision and Drainage, left lower extremity debridement, washout Left 11/14/2018    Performed by Mitch Chan MD at [facility] OR    INSERTION, ICD GENERATOR, SINGLE CHAMBER N/A 11/2/2018    Performed by Pernell Greer MD at [facility] CATH LAB    INSERTION, IOL PROSTHESIS Right 9/26/2018    [facility]    INTRAOCULAR PROSTHESES INSERTION Right 9/26/2018    Procedure: INSERTION, IOL PROSTHESIS;  Surgeon: Perla Cortés MD;  Location: [facility] OR [address] [place] [address];  Service: Ophthalmology;  Laterality: Right;    PHACOEMULSIFICATION OF CATARACT Right 9/26/2018    Procedure: PHACOEMULSIFICATION, CATARACT;  Surgeon: Perla Cortés  MD;  Location: [facility] OR [address] [place] [address];  Service: Ophthalmology;  Laterality: Right;    PHACOEMULSIFICATION, CATARACT Right 9/26/2018    Performed by Perla Cortés MD at [facility] OR [address] [place] [address]    Right foot surgery  10/2014    TOE AMPUTATION Right     first and second    TOE AMPUTATION Left 11/16/2018    Procedure: AMPUTATION, TOES  2-5;  Surgeon: Mitch Chan MD;  Location: [facility] OR;  Service: Vascular;  Laterality: Left;    TOE AMPUTATION Left 12/5/2018    Procedure: AMPUTATION, TOE;  Surgeon: Mitch Chan MD;  Location: [facility] OR;  Service: Vascular;  Laterality: Left;  left great toe    TONSILLECTOMY      TRABECULECTOMY/G 6 LASER Left 2/15/2017    Performed by Perla Cortés MD at [facility] OR [address] [place] [address]       Review of patient's allergies indicates:   Allergen Reactions    Statins-hmg-coa reductase inhibitors      Generalized Pain    Onglyza [saxagliptin]     Penicillins Rash       No current facility-administered medications on file prior to encounter.      Current Outpatient Medications on File Prior to Encounter   Medication Sig    allopurinol (ZYLOPRIM) 100 MG tablet Take 2 tablets (200 mg total) by mouth once daily.    amLODIPine (NORVASC) 5 MG tablet TAKE 1 TABLET(5 MG) BY MOUTH EVERY DAY    aspirin 81 MG Chew Take 81 mg by mouth once daily.    carvedilol (COREG) 6.25 MG tablet Take 1 tablet (6.25 mg total) by mouth 2 (two) times daily.    coenzyme Q10 100 mg capsule Take 100 mg by mouth every morning.    collagenase (SANTYL) ointment Apply topically once daily.    dorzolamide-timolol 2-0.5% (COSOPT) 22.3-6.8 mg/mL ophthalmic solution Place 1 drop into both eyes 3 (three) times daily.    ezetimibe (ZETIA) 10 mg tablet Take 1 tablet (10 mg total) by mouth once daily.    fish oil-omega-3 fatty acids 300-1,000 mg capsule Take 2 capsules (2 g total) by mouth 2 (two) times daily. (Patient taking differently: Take 1 g by mouth 2 (two) times daily. )    insulin glargine-lixisenatide  "(SOLIQUA /) 100 unit-33 mcg/mL InPn Inject 34 units once daily    ketorolac 0.5% (ACULAR) 0.5 % Drop Place 1 drop into the right eye 3 (three) times daily.    MULTIVIT,THER IRON,CA,FA & MIN (MULTIVITAMIN) Tab Take 1 tablet by mouth every morning.     oxyCODONE-acetaminophen (PERCOCET) 5-325 mg per tablet Take 1 tablet by mouth every 6 (six) hours as needed for Pain.    pen needle, diabetic 31 gauge x 1/4" Ndle Use as directed     Family History     Problem Relation (Age of Onset)    Diabetes Mother    Heart disease Brother    No Known Problems Father, Sister, Maternal Aunt, Maternal Uncle, Paternal Aunt, Paternal Uncle, Maternal Grandmother, Maternal Grandfather, Paternal Grandmother, Paternal Grandfather        Tobacco Use    Smoking status: Former Smoker     Packs/day: 1.00     Years: 3.00     Pack years: 3.00     Types: Cigarettes     Last attempt to quit: 1984     Years since quittin.4    Smokeless tobacco: Never Used   Substance and Sexual Activity    Alcohol use: Yes     Alcohol/week: 0.6 oz     Types: 1 Cans of beer per week     Comment: Occasional    Drug use: No    Sexual activity: Not Currently     Review of Systems   Unable to perform ROS: intubated     Objective:     Vital Signs (Most Recent):  Temp: 98.5 °F (36.9 °C) (18 0315)  Pulse: 74 (18 0630)  Resp: 16 (18 0630)  BP: 117/61 (18 0600)  SpO2: 99 % (18 0630) Vital Signs (24h Range):  Temp:  [97.2 °F (36.2 °C)-98.5 °F (36.9 °C)] 98.5 °F (36.9 °C)  Pulse:  [69-86] 74  Resp:  [8-33] 16  SpO2:  [94 %-100 %] 99 %  BP: (116-139)/(59-71) 117/61  Arterial Line BP: ()/() 117/46     Weight: 99.8 kg (220 lb 0.3 oz)  Body mass index is 31.57 kg/m².    SpO2: 99 %  O2 Device (Oxygen Therapy): ventilator      Intake/Output Summary (Last 24 hours) at 2018 0952  Last data filed at 2018 0600  Gross per 24 hour   Intake 1667.98 ml   Output 4570 ml   Net -2902.02 ml       Lines/Drains/Airways  "    Peripherally Inserted Central Catheter Line                 PICC Double Lumen 12/10/18 2241 right basilic 2 days          Drain                 Open Drain 11/14/18 1353 Left Foot Other (see comments) Other (see comments) 28 days         NG/OG Tube 12/11/18 1350 orogastric 7.5 Fr. Center mouth 1 day         Urethral Catheter 12/11/18 1351 Double-lumen 16 Fr. 1 day          Airway                 Airway - Non-Surgical 11/16/18 0815 Other (Comment) 27 days         Airway - Non-Surgical 12/11/18 1320 Endotracheal Tube 1 day          Arterial Line                 Arterial Line 12/11/18 1530 Left Radial 1 day                Physical Exam   Constitutional: He appears well-developed and well-nourished.   HENT:   Head: Normocephalic and atraumatic.   Eyes: No scleral icterus.   Neck: No JVD present. No thyromegaly present.   Cardiovascular: Normal rate, regular rhythm, S1 normal and S2 normal. Exam reveals distant heart sounds.   Pulmonary/Chest: Effort normal and breath sounds normal. No respiratory distress.   intub and sedated   Abdominal: Soft. He exhibits no distension.   Musculoskeletal: He exhibits no edema.   Neurological:   UTO   Skin: Skin is warm and dry.   Psychiatric:   UTO       Current Medications:   allopurinol  200 mg Oral Daily    aspirin  81 mg Oral Daily    brimonidine 0.1%  1 drop Both Eyes BID    chlorhexidine  15 mL Mouth/Throat BID    clopidogrel  75 mg Oral Daily    collagenase   Topical (Top) Daily    enoxaparin  40 mg Subcutaneous Daily    ezetimibe  10 mg Oral Daily    famotidine (PF)  20 mg Intravenous BID    furosemide  40 mg Intravenous Daily    meropenem (MERREM) IVPB  2 g Intravenous Q8H    miconazole NITRATE 2 %   Topical (Top) BID    omega 3-dha-epa-fish oil  1 capsule Oral BID    prednisoLONE acetate  1 drop Right Eye Daily    sodium chloride 0.9%  10 mL Intravenous Q6H    vancomycin (VANCOCIN) IVPB  1,500 mg Intravenous Q12H      norepinephrine bitartrate-D5W 0.04  mcg/kg/min (12/13/18 0908)    propofol Stopped (12/13/18 0928)     acetaminophen, dextrose 50%, dextrose 50%, fentaNYL, glucagon (human recombinant), glucose, glucose, insulin aspart U-100, ondansetron, oxyCODONE-acetaminophen, ramelteon, Flushing PICC Protocol **AND** sodium chloride 0.9% **AND** sodium chloride 0.9%    Laboratory:  CBC:  Recent Labs   Lab 12/10/18  1718 12/11/18  0604 12/11/18  1406 12/12/18  0226 12/13/18  0230   WHITE BLOOD CELL COUNT 7.18 8.68 8.50 7.80 8.80   HEMOGLOBIN 9.2 L 8.5 L 8.6 L 9.1 L 9.5 L   HEMATOCRIT 28.2 L 26.6 L 26.2 L 27.6 L 28.7 L   PLATELETS 328 327 297 343 346       CHEMISTRIES:  Recent Labs   Lab 05/06/16  0548 05/07/16  0441 05/08/16  0606  12/03/18  1129  12/10/18  1718 12/11/18  0604 12/11/18  1406 12/12/18  0226 12/13/18  0230   GLUCOSE 247 H 123 H 107   < > 122 H   < > 74 76 132 H 153 H 185 H   SODIUM 139 140 140   < > 138   < > 139 139 137 139 141   POTASSIUM 5.0 4.6 4.5   < > 4.1   < > 4.0 4.1 4.4 3.8 3.8   BUN BLD 23 H 19 13   < > 59 H   < > 56 H 58 H 60 H 50 H 29 H   CREATININE 1.0 0.8 0.8   < > 1.1   < > 1.1 1.2 1.4 1.1 0.8   EGFR IF AFRICAN AMERICAN >60.0 >60.0 >60.0   < > >60   < > >60 >60 >60 >60 >60   EGFR IF NON- >60.0 >60.0 >60.0   < > >60   < > >60 >60 54 A >60 >60   CALCIUM 9.4 9.0 9.1   < > 8.6 L   < > 8.7 8.5 L 8.2 L 8.6 L 8.7   MAGNESIUM 1.8 1.8 1.6  --  1.8  --   --   --  1.8  --   --     < > = values in this interval not displayed.       CARDIAC BIOMARKERS:  Recent Labs   Lab 05/05/16  2351 05/06/16  0548 10/12/16  1227 12/11/18  1406 12/11/18  2024 12/12/18  0226   CPK  --   --  93  --   --   --    TROPONIN I <0.006 0.013  --  0.532 H 0.449 H 0.338 H       COAGS:  Recent Labs   Lab 10/14/18  1059 10/30/18  0955 11/08/18  1154 12/03/18  1130 12/10/18  1718   INR 1.2 1.1 1.1 1.2 1.1       LIPIDS/LFTS:  Recent Labs   Lab 01/08/16  0952  05/08/16  0606 10/12/16  1227 07/27/18  0820  10/30/18  0955  12/09/18  1905 12/11/18  0604  12/11/18  1406 12/12/18  0226 12/13/18  0230   CHOLESTEROL 201 H  --  168 174 151  --  107 L  --   --   --   --   --   --    TRIGLYCERIDES 136  --  97 80 83  --  44  --   --   --   --   --   --    HDL 33 L  --  28 L 35 L 29 L  --  27 L  --   --   --   --   --   --    LDL CHOLESTEROL 140.8  --  120.6 123.0 105.4  --  71.2  --   --   --   --   --   --    NON-HDL CHOLESTEROL 168  --  140 139 122  --  80  --   --   --   --   --   --    AST 30   < > 9 L 16 27   < >  --    < > 21 20 29 20 14   ALT 12   < > 9 L 15 28   < >  --    < > 18 16 21 19 14    < > = values in this interval not displayed.       BNP:  Recent Labs   Lab 05/05/16  2351 12/11/18  1406    H 2,503 H       TSH:        Free T4:        Diagnostic Results:  ECG (personally reviewed tracings):  12/11/18 1359 SR 78, PRWP, NSSTTW changes, similar to 11/9/18    Chest X-Ray (personally reviewed image(s)): 12/11/18  1. Interval development of an air density curvilinear airspace opacity subjacent to the right hemidiaphragm suggest interval development of pneumoperitoneum.  Surgical consult/evaluation is indicated.  2.  Interval placement of an endotracheal tube in good position.  Remaining lines and leads are stable.  No pneumothorax.  3.  Bilateral perihilar and bibasilar airspace opacities likely reflect atelectasis however, superimposed infection cannot be excluded.    LE venous US 12/11/18  No evidence of deep venous thrombosis in either lower extremity.    Echo: 12/11/18 (images pers rev, similar EF to report 10/16/18)  · Severely decreased left ventricular systolic function. The estimated ejection fraction is 20%  · Severe global hypokinetic wall motion. Cannot exclude RWMA.  · Septal wall has abnormal motion. Systolic and diastolic flattening of the interventricular septum consistent with right ventricle pressure and volume overload.  · Left ventricular diastolic dysfunction.  · Mild right ventricular enlargement.  · Mildly to moderately reduced right  ventricular systolic function.  · Moderate tricuspid regurgitation.  · There is a large left pleural effusion.    Stress Test: 10/17/18  · Abnormal myocardial perfusion study  · There is a moderate amount of infarct in the inferior wall(s).In the typical distribution of the RCA territory.  · Post Stress Ejection Fraction is 17 %    Cath: 5/6/16  B. Summary/Post-Operative Diagnosis    Three vessel coronary artery disease.    LV systolic and diastolic dysfunction.  D. Hemodynamic Results  LVEDP (Pre): 25 mmHg  LVEDP (Post): 30 mmHg  Ejection Fraction: 35%  E. Angiographic Results       Patient has a right dominant coronary artery.      - Left Main Coronary Artery:             The LM has luminal irregularities. There is BAKARI 3 flow.     - Left Anterior Descending Artery:             The proximal LAD has a 80% stenosis. There is BAKARI 3 flow.             The mid LAD has a 90% stenosis. There is BAKARI 3 flow. The remaining portion of the vessel is diffusely diseased.     - Left Circumflex Artery:             The mid LCX has a 60% stenosis. There is BAKARI 3 flow. The remaining portion of the vessel has luminal irregularities.     - Right Coronary Artery:             The proximal RCA has a 60% stenosis. There is BAKARI 3 flow.             The mid RCA has a 80% stenosis. There is BAKARI 3 flow. The remaining portion of the vessel has luminal irregularities.     - Posterior Descending Artery:             The proximal PDA has a 60% stenosis. There is BAKARI 3 flow. The remaining portion of the vessel has luminal irregularities.      Assessment and Plan:     * Cardiac arrest    Unclear precipitant, ?PEA d/t underlying severe comorbidities on background of severe CAD/CMP.  Minimal flat trop elev, doubt ACS.  Not on tele when this occurred, but has ICD in place  No immediate premorbid anginal sxs  Currently in SR  [facility] PPM interrogated 12/11/18, no tachy events.  Note made of episodic pacing at 40BPM, but unclear exact timing.  This  does not appear to be an arrhythmic syncopal event/cardiac arrest based on these findings.     CAD (coronary artery disease)    Known MV CAD unamenable to CABG or PCI (per notes from 2016)  Severe LV dysfxn noted  St. Topher ICD in place  Mild flat trop elev likely demand related in setting of severe CAD, unlikely ACS.     Chronic combined systolic and diastolic heart failure    As above     Essential hypertension    Cont med rx as BP will tolerate     HLD (hyperlipidemia)    Cont zetia     DM type 2 with diabetic peripheral neuropathy    Per IM     Foot infection    Per IM/ID         VTE Risk Mitigation (From admission, onward)        Ordered     enoxaparin injection 40 mg  Daily      12/12/18 0820     IP VTE HIGH RISK PATIENT  Once      12/10/18 2126          Pt seen in ICU, critical care time 35min    Ismael Romano MD  Cardiology  [facility]

## 2018-12-13 NOTE — ASSESSMENT & PLAN NOTE
Patient has severe 3v disease per [facility] in 2016 with no targets for PCI or CABG  Continue asa. Add plavix (discussed with Juan Antonio). Continue lipid management. Hold BB and ACEi for hypotension  With elevated troponin- see elsewhere

## 2018-12-14 PROBLEM — Z71.89 GOALS OF CARE, COUNSELING/DISCUSSION: Status: ACTIVE | Noted: 2018-12-14

## 2018-12-14 LAB
ALBUMIN SERPL BCP-MCNC: 1.9 G/DL
ALP SERPL-CCNC: 123 U/L
ALT SERPL W/O P-5'-P-CCNC: 11 U/L
ANION GAP SERPL CALC-SCNC: 7 MMOL/L
AST SERPL-CCNC: 14 U/L
BASOPHILS # BLD AUTO: 0.03 K/UL
BASOPHILS NFR BLD: 0.4 %
BILIRUB SERPL-MCNC: 0.7 MG/DL
BUN SERPL-MCNC: 20 MG/DL
CALCIUM SERPL-MCNC: 8.4 MG/DL
CHLORIDE SERPL-SCNC: 105 MMOL/L
CO2 SERPL-SCNC: 29 MMOL/L
CREAT SERPL-MCNC: 0.7 MG/DL
DIFFERENTIAL METHOD: ABNORMAL
EOSINOPHIL # BLD AUTO: 0.2 K/UL
EOSINOPHIL NFR BLD: 3 %
ERYTHROCYTE [DISTWIDTH] IN BLOOD BY AUTOMATED COUNT: 16.5 %
EST. GFR  (AFRICAN AMERICAN): >60 ML/MIN/1.73 M^2
EST. GFR  (NON AFRICAN AMERICAN): >60 ML/MIN/1.73 M^2
GLUCOSE SERPL-MCNC: 180 MG/DL
HCT VFR BLD AUTO: 27.7 %
HGB BLD-MCNC: 8.9 G/DL
LYMPHOCYTES # BLD AUTO: 0.6 K/UL
LYMPHOCYTES NFR BLD: 8.3 %
MCH RBC QN AUTO: 30.2 PG
MCHC RBC AUTO-ENTMCNC: 32.1 G/DL
MCV RBC AUTO: 94 FL
MONOCYTES # BLD AUTO: 0.8 K/UL
MONOCYTES NFR BLD: 11.4 %
NEUTROPHILS # BLD AUTO: 5.4 K/UL
NEUTROPHILS NFR BLD: 76.9 %
PLATELET # BLD AUTO: 298 K/UL
PMV BLD AUTO: 8.4 FL
POCT GLUCOSE: 173 MG/DL (ref 70–110)
POCT GLUCOSE: 180 MG/DL (ref 70–110)
POCT GLUCOSE: 204 MG/DL (ref 70–110)
POCT GLUCOSE: 205 MG/DL (ref 70–110)
POCT GLUCOSE: 216 MG/DL (ref 70–110)
POTASSIUM SERPL-SCNC: 3.6 MMOL/L
PROT SERPL-MCNC: 6.4 G/DL
RBC # BLD AUTO: 2.95 M/UL
SODIUM SERPL-SCNC: 141 MMOL/L
VANCOMYCIN TROUGH SERPL-MCNC: 15 UG/ML
WBC # BLD AUTO: 6.99 K/UL

## 2018-12-14 PROCEDURE — 25000003 PHARM REV CODE 250: Performed by: HOSPITALIST

## 2018-12-14 PROCEDURE — 99232 SBSQ HOSP IP/OBS MODERATE 35: CPT | Mod: 24,,, | Performed by: INTERNAL MEDICINE

## 2018-12-14 PROCEDURE — 25000003 PHARM REV CODE 250: Performed by: EMERGENCY MEDICINE

## 2018-12-14 PROCEDURE — 25000003 PHARM REV CODE 250: Performed by: INTERNAL MEDICINE

## 2018-12-14 PROCEDURE — 85025 COMPLETE CBC W/AUTO DIFF WBC: CPT

## 2018-12-14 PROCEDURE — 36415 COLL VENOUS BLD VENIPUNCTURE: CPT

## 2018-12-14 PROCEDURE — 63600175 PHARM REV CODE 636 W HCPCS: Performed by: INTERNAL MEDICINE

## 2018-12-14 PROCEDURE — A4216 STERILE WATER/SALINE, 10 ML: HCPCS | Performed by: INTERNAL MEDICINE

## 2018-12-14 PROCEDURE — 99233 SBSQ HOSP IP/OBS HIGH 50: CPT | Mod: ,,, | Performed by: PHYSICIAN ASSISTANT

## 2018-12-14 PROCEDURE — 63600175 PHARM REV CODE 636 W HCPCS: Performed by: NURSE PRACTITIONER

## 2018-12-14 PROCEDURE — 63600175 PHARM REV CODE 636 W HCPCS: Performed by: HOSPITALIST

## 2018-12-14 PROCEDURE — 25000003 PHARM REV CODE 250: Performed by: NURSE PRACTITIONER

## 2018-12-14 PROCEDURE — S5571 INSULIN DISPOS PEN 3 ML: HCPCS | Performed by: HOSPITALIST

## 2018-12-14 PROCEDURE — 80202 ASSAY OF VANCOMYCIN: CPT

## 2018-12-14 PROCEDURE — 20000000 HC ICU ROOM

## 2018-12-14 PROCEDURE — 80053 COMPREHEN METABOLIC PANEL: CPT

## 2018-12-14 RX ORDER — AMOXICILLIN 250 MG
1 CAPSULE ORAL 2 TIMES DAILY
Status: DISCONTINUED | OUTPATIENT
Start: 2018-12-14 | End: 2018-12-20 | Stop reason: HOSPADM

## 2018-12-14 RX ADMIN — OMEGA-3 FATTY ACIDS CAP 1000 MG 1 CAPSULE: 1000 CAP at 09:12

## 2018-12-14 RX ADMIN — VANCOMYCIN HYDROCHLORIDE 1500 MG: 1 INJECTION, POWDER, LYOPHILIZED, FOR SOLUTION INTRAVENOUS at 12:12

## 2018-12-14 RX ADMIN — INSULIN ASPART 4 UNITS: 100 INJECTION, SOLUTION INTRAVENOUS; SUBCUTANEOUS at 04:12

## 2018-12-14 RX ADMIN — Medication 10 ML: at 12:12

## 2018-12-14 RX ADMIN — BRIMONIDINE TARTRATE 1 DROP: 1 SOLUTION/ DROPS OPHTHALMIC at 09:12

## 2018-12-14 RX ADMIN — STANDARDIZED SENNA CONCENTRATE AND DOCUSATE SODIUM 1 TABLET: 8.6; 5 TABLET, FILM COATED ORAL at 09:12

## 2018-12-14 RX ADMIN — COLLAGENASE SANTYL: 250 OINTMENT TOPICAL at 09:12

## 2018-12-14 RX ADMIN — INSULIN ASPART 2 UNITS: 100 INJECTION, SOLUTION INTRAVENOUS; SUBCUTANEOUS at 10:12

## 2018-12-14 RX ADMIN — ASPIRIN 81 MG 81 MG: 81 TABLET ORAL at 09:12

## 2018-12-14 RX ADMIN — INSULIN ASPART 2 UNITS: 100 INJECTION, SOLUTION INTRAVENOUS; SUBCUTANEOUS at 06:12

## 2018-12-14 RX ADMIN — Medication: at 09:12

## 2018-12-14 RX ADMIN — VANCOMYCIN HYDROCHLORIDE 1500 MG: 1 INJECTION, POWDER, LYOPHILIZED, FOR SOLUTION INTRAVENOUS at 01:12

## 2018-12-14 RX ADMIN — INSULIN ASPART 4 UNITS: 100 INJECTION, SOLUTION INTRAVENOUS; SUBCUTANEOUS at 11:12

## 2018-12-14 RX ADMIN — MEROPENEM 2 G: 1 INJECTION, POWDER, FOR SOLUTION INTRAVENOUS at 06:12

## 2018-12-14 RX ADMIN — CLOPIDOGREL BISULFATE 75 MG: 75 TABLET ORAL at 09:12

## 2018-12-14 RX ADMIN — DORZOLAMIDE HYDROCHLORIDE AND TIMOLOL MALEATE 1 DROP: 20; 5 SOLUTION OPHTHALMIC at 03:12

## 2018-12-14 RX ADMIN — EZETIMIBE 10 MG: 10 TABLET ORAL at 09:12

## 2018-12-14 RX ADMIN — ALLOPURINOL 200 MG: 100 TABLET ORAL at 09:12

## 2018-12-14 RX ADMIN — INSULIN ASPART 1 UNITS: 100 INJECTION, SOLUTION INTRAVENOUS; SUBCUTANEOUS at 12:12

## 2018-12-14 RX ADMIN — DORZOLAMIDE HYDROCHLORIDE AND TIMOLOL MALEATE 1 DROP: 20; 5 SOLUTION OPHTHALMIC at 09:12

## 2018-12-14 RX ADMIN — Medication 10 ML: at 06:12

## 2018-12-14 RX ADMIN — INSULIN DETEMIR 5 UNITS: 100 INJECTION, SOLUTION SUBCUTANEOUS at 09:12

## 2018-12-14 RX ADMIN — FUROSEMIDE 40 MG: 10 INJECTION, SOLUTION INTRAMUSCULAR; INTRAVENOUS at 09:12

## 2018-12-14 RX ADMIN — PREDNISOLONE ACETATE 1 DROP: 10 SUSPENSION/ DROPS OPHTHALMIC at 09:12

## 2018-12-14 RX ADMIN — ENOXAPARIN SODIUM 40 MG: 100 INJECTION SUBCUTANEOUS at 05:12

## 2018-12-14 RX ADMIN — OXYCODONE AND ACETAMINOPHEN 1 TABLET: 5; 325 TABLET ORAL at 03:12

## 2018-12-14 RX ADMIN — MEROPENEM 2 G: 1 INJECTION, POWDER, FOR SOLUTION INTRAVENOUS at 02:12

## 2018-12-14 RX ADMIN — MEROPENEM 2 G: 1 INJECTION, POWDER, FOR SOLUTION INTRAVENOUS at 10:12

## 2018-12-14 RX ADMIN — NOREPINEPHRINE-DEXTROSE IV SOLUTION 4 MG/250ML-5% 0.04 MCG/KG/MIN: 4-5/250 SOLUTION at 06:12

## 2018-12-14 NOTE — PROGRESS NOTES
[facility]  Pulmonology  Progress Note    Patient Name: Marvin Ray  MRN: [id number]  Admission Date: 12/10/2018  Hospital Length of Stay: 3 days  Code Status: Full Code  Attending Provider: Jazz Sotomayor MD  Primary Care Provider: Rubén Davis MD   Principal Problem: Cardiac arrest    Subjective:     Interval History: Extubated yesterday to bipap. Now on 3L NC tolerating well. No complaints this AM.     Objective:     Vital Signs (Most Recent):  Temp: 99 °F (37.2 °C) (12/14/18 0701)  Pulse: 83 (12/14/18 1045)  Resp: 13 (12/14/18 1045)  BP: (!) 116/57 (12/14/18 1000)  SpO2: 97 % (12/14/18 1045) Vital Signs (24h Range):  Temp:  [98.5 °F (36.9 °C)-99.3 °F (37.4 °C)] 99 °F (37.2 °C)  Pulse:  [] 83  Resp:  [11-23] 13  SpO2:  [91 %-100 %] 97 %  BP: (109-139)/(55-96) 116/57  Arterial Line BP: ()/(43-61) 114/51     Weight: 99.8 kg (220 lb 0.3 oz)  Body mass index is 31.57 kg/m².      Intake/Output Summary (Last 24 hours) at 12/14/2018 1508  Last data filed at 12/14/2018 0600  Gross per 24 hour   Intake 676.58 ml   Output 795 ml   Net -118.42 ml       Physical Exam   Constitutional: He is oriented to person, place, and time. He appears well-developed and well-nourished. No distress. Nasal cannula in place.   HENT:   Head: Normocephalic and atraumatic.   Eyes: EOM are normal. Right pupil is not round. Right pupil is reactive. Left pupil is not round. Left pupil is reactive.   Neck: Normal range of motion. No tracheal deviation present. No thyromegaly present.   Cardiovascular: Normal rate and regular rhythm. Exam reveals no gallop and no friction rub.   No murmur heard.  Pulses:       Dorsalis pedis pulses are 2+ on the right side.   Pulmonary/Chest: Effort normal and breath sounds normal. No stridor. No tachypnea. No respiratory distress. He has no decreased breath sounds. He has no wheezes. He has no rhonchi. He has no rales.   Abdominal: Soft. Bowel sounds are normal. There is no  tenderness.   Musculoskeletal: Normal range of motion.   Right first and second toe amputated  Bandaging covering LLE up to the knee   Neurological: He is alert and oriented to person, place, and time. He has normal strength. No sensory deficit. GCS eye subscore is 4. GCS verbal subscore is 5. GCS motor subscore is 6.   Skin: Skin is warm and dry.       Vents:  Vent Mode: NIV+ PC (12/13/18 1123)  Ventilator Initiated: Yes (12/11/18 1413)  Set Rate: 12 bmp (12/13/18 1123)  Vt Set: 440 mL (12/13/18 0800)  Pressure Support: 5 cmH20 (12/13/18 1045)  PEEP/CPAP: 5 cmH20 (12/13/18 1123)  Oxygen Concentration (%): 40 (12/13/18 1230)  Peak Airway Pressure: 9.6 cmH2O (12/13/18 1123)  Total Ve: 7.5 mL (12/13/18 1123)  F/VT Ratio<105 (RSBI): (!) 9.12 (12/13/18 1123)    Lines/Drains/Airways     Peripherally Inserted Central Catheter Line                 PICC Double Lumen 12/10/18 2241 right basilic 3 days          Drain                 Open Drain 11/14/18 1353 Left Foot Other (see comments) Other (see comments) 30 days         Urethral Catheter 12/11/18 1351 Double-lumen 16 Fr. 3 days          Airway                 Airway - Non-Surgical 11/16/18 0815 Other (Comment) 28 days          Arterial Line                 Arterial Line 12/11/18 1530 Left Radial 2 days                Significant Labs:    CBC/Anemia Profile:  Recent Labs   Lab 12/13/18  0230 12/14/18  0240   WBC 8.80 6.99   HGB 9.5* 8.9*   HCT 28.7* 27.7*    298   MCV 94 94   RDW 16.7* 16.5*        Chemistries:  Recent Labs   Lab 12/13/18  0230 12/14/18  0240    141   K 3.8 3.6    105   CO2 28 29   BUN 29* 20   CREATININE 0.8 0.7   CALCIUM 8.7 8.4*   ALBUMIN 1.9* 1.9*   PROT 6.7 6.4   BILITOT 0.6 0.7   ALKPHOS 129 123   ALT 14 11   AST 14 14       All pertinent labs within the past 24 hours have been reviewed.    Significant Imaging:  I have reviewed and interpreted all pertinent imaging results/findings within the past 24 hours.    Assessment/Plan:      * Cardiac arrest    Unclear etiology. Likely related to hypoxemia in the setting of right ventricular failure and use of sedating medications. TTM not intiated due to patients neurologic response s/p cardiac arrest.  --Continue supportive care  --No neurologic injury     Other ascites    No evidence to suggest hepatic dysfunction. Suspect the ascites is multifactorial related to hypoalbuminemia, gross volume overload.  Despite the low SAGG, ascities likely due to right ventricular dysfunction.   -- large volume paracentesis 12/12  -- Negative for SBP  -- Last SAAG 0.9; repeat SAAG 0.4; ordered cytology on peritoneal fluid. Would follow up to determine source of ascites.      Shock    Shock appears multifactorial in likely secondary to cardiac decompensation and sedative infusions to facilitate mechanical ventilation. Sedative medications now off. Unlikely septic shock due to appropriate coverage and no new growth in cultures.  -- ID broadened to vancomycin and meropenem. Otherwise he has been afebrile and no additional clear source of secondary infection noted.   -- Wean norepinephrine to maintain map greater than 65.  Very low dose this AM. Could likely wean off today.  -- Continue with diuresis to optimize cardiac function.     Acute hypoxemic respiratory failure    Likely secondary to cardiac arrest and ongoing cardiac decompensation and gross volume overload with impaired diaphragmatic excursion secondary to tense abdominal ascites. Oxygenation improved s/p paracentesis 12/12/2018  -- continue with diuresis as tolerated  -- follow up paracentesis fluid cytology  -- Extubated 12/13/2018; now on 3L NC and tolerating well     Diabetic foot infection    Secondary to DM/PVD- s/p revascularization on 11/13/2018 including a left SFA, AT and peroneal angioplasty to improve perfusion to his left foot due to extensive left foot wound. Wound again debrided by vascular on November 29th and 12/5.  Cultures with  Acinetobacter.  Continue IV Meropenem and vancomycin for 6 weeks per ID     DM type 2 with diabetic peripheral neuropathy    Hemoglobin A1c- 8.  Sliding scale insulin.  Target glucose 140-180       I have spent 35 min with this patient, with over 50% of this time spent coordinating care and speaking with the family      Thank you for the consult. We will sign off. Please call if you have any questions.      Nichelle Leonardo PA-C  Pulmonology  [facility]

## 2018-12-14 NOTE — SUBJECTIVE & OBJECTIVE
Interval History: Awake. Complains of pain in chest with coughing, no other complaints. No BMs recently.     Review of Systems   Respiratory: Positive for cough. Negative for chest tightness, shortness of breath and wheezing.    Cardiovascular: Positive for chest pain. Negative for palpitations and leg swelling.   Gastrointestinal: Positive for abdominal distention and constipation. Negative for abdominal pain, diarrhea, nausea and vomiting.   Skin: Positive for wound.     Objective:     Vital Signs (Most Recent):  Temp: 98.5 °F (36.9 °C) (12/14/18 0315)  Pulse: 77 (12/14/18 0600)  Resp: 15 (12/14/18 0600)  BP: (!) 109/58 (12/14/18 0600)  SpO2: 98 % (12/14/18 0600) Vital Signs (24h Range):  Temp:  [97.7 °F (36.5 °C)-99.3 °F (37.4 °C)] 98.5 °F (36.9 °C)  Pulse:  [74-91] 77  Resp:  [10-22] 15  SpO2:  [80 %-100 %] 98 %  BP: (108-146)/(55-96) 109/58  Arterial Line BP: ()/(37-57) 105/47     Weight: 99.8 kg (220 lb 0.3 oz)  Body mass index is 31.57 kg/m².    Intake/Output Summary (Last 24 hours) at 12/14/2018 0828  Last data filed at 12/14/2018 0600  Gross per 24 hour   Intake 1120.63 ml   Output 2120 ml   Net -999.37 ml      Physical Exam   Constitutional: He appears well-developed and well-nourished. No distress.   HENT:   Head: Normocephalic and atraumatic.   Mouth/Throat: Oropharynx is clear and moist.   ETT and OGT in place   Cardiovascular: Normal rate, regular rhythm, normal heart sounds and intact distal pulses. Exam reveals no gallop and no friction rub.   No murmur heard.  Pulmonary/Chest: Effort normal and breath sounds normal. No stridor. No respiratory distress. He has no wheezes. He has no rales.   Mechanical ventilation   Abdominal: Bowel sounds are normal. He exhibits distension (improved). He exhibits no mass. There is no tenderness. There is no guarding.   Bandage in RLQ dry and intact   Musculoskeletal: He exhibits edema.   Neurological: He is alert.   Oriented to person and place. Not oriented to  year. Oriented to situation   Skin: He is not diaphoretic.   Bilateral feet dressed   Nursing note and vitals reviewed.      Significant Labs: All pertinent labs within the past 24 hours have been reviewed.    Significant Imaging: I have reviewed and interpreted all pertinent imaging results/findings within the past 24 hours.

## 2018-12-14 NOTE — ASSESSMENT & PLAN NOTE
Last HgbA1c   Lab Results   Component Value Date    HGBA1C 8.3 (H) 10/30/2018   Hold oral antihyperglycemics while inpatient    Meds: detemir 5 + SSI  ACHS accuchecks, ADA diet, hypoglycemic protocol

## 2018-12-14 NOTE — PROGRESS NOTES
[facility]  Cardiology  Progress Note    Patient Name: Marvin Ray  MRN: [id number]  Admission Date: 12/10/2018  Hospital Length of Stay: 3 days  Code Status: Full Code   Attending Physician: Jazz Sotomayor MD   Primary Care Physician: Rubén Davis MD  Expected Discharge Date: 12/11/2018  Principal Problem:Cardiac arrest    Subjective:     Hospital Course:   12/11/18: admitted to ICU after cardiac arrest  12/13: no acute events, low dose levo    Interval History:  Denies any cardiopulmonary complaints this a.m..  He is eating without any issues.    Review of Systems   All other systems reviewed and are negative.    Objective:     Vital Signs (Most Recent):  Temp: 98.5 °F (36.9 °C) (12/14/18 0315)  Pulse: 77 (12/14/18 0600)  Resp: 15 (12/14/18 0600)  BP: (!) 109/58 (12/14/18 0600)  SpO2: 98 % (12/14/18 0600) Vital Signs (24h Range):  Temp:  [97.7 °F (36.5 °C)-99.3 °F (37.4 °C)] 98.5 °F (36.9 °C)  Pulse:  [77-91] 77  Resp:  [10-22] 15  SpO2:  [80 %-100 %] 98 %  BP: (108-146)/(55-96) 109/58  Arterial Line BP: ()/(38-57) 105/47     Weight: 99.8 kg (220 lb 0.3 oz)  Body mass index is 31.57 kg/m².     SpO2: 98 %  O2 Device (Oxygen Therapy): nasal cannula      Intake/Output Summary (Last 24 hours) at 12/14/2018 0935  Last data filed at 12/14/2018 0600  Gross per 24 hour   Intake 1093.07 ml   Output 1775 ml   Net -681.93 ml       Lines/Drains/Airways     Peripherally Inserted Central Catheter Line                 PICC Double Lumen 12/10/18 2241 right basilic 3 days          Drain                 Open Drain 11/14/18 1353 Left Foot Other (see comments) Other (see comments) 29 days         Urethral Catheter 12/11/18 1351 Double-lumen 16 Fr. 2 days          Airway                 Airway - Non-Surgical 11/16/18 0815 Other (Comment) 28 days          Arterial Line                 Arterial Line 12/11/18 1530 Left Radial 2 days                Physical Exam   Constitutional: He is oriented to person,  place, and time. No distress.   Cardiovascular: Normal rate and regular rhythm.   Pulmonary/Chest:   Decreased coarse breath sounds anteriorly   Musculoskeletal: He exhibits no edema.   Neurological: He is alert and oriented to person, place, and time.       Significant Labs:   BMP:   Recent Labs   Lab 12/13/18  0230 12/14/18  0240   * 180*    141   K 3.8 3.6    105   CO2 28 29   BUN 29* 20   CREATININE 0.8 0.7   CALCIUM 8.7 8.4*    and CBC   Recent Labs   Lab 12/13/18  0230 12/14/18  0240   WBC 8.80 6.99   HGB 9.5* 8.9*   HCT 28.7* 27.7*    298       Significant Imaging: Echocardiogram:   Transthoracic echo (TTE) complete (Cupid Only):   Results for orders placed or performed during the hospital encounter of 12/10/18   Transthoracic echo (TTE) complete (Cupid Only)   Result Value Ref Range    BSA 2.33 m2    LA WIDTH 4.70 cm    AORTIC VALVE CUSP SEPERATION 1.95 cm    PV PEAK VELOCITY 0.97 cm/s    LVIDD 5.48 3.5 - 6.0 cm    IVS 0.94 0.6 - 1.1 cm    PW 0.95 0.6 - 1.1 cm    Ao root annulus 3.08 cm    LVIDS 4.59 (A) 2.1 - 4.0 cm    FS 16 28 - 44 %    LA volume 113.65 cm3    Sinus 3.19 cm    STJ 2.25 cm    Ascending aorta 2.60 cm    LV mass 196.74 g    LA size 4.84 cm    RVDD 5.45 cm    TAPSE 1.48 cm    RV S' 11.20 m/s    Left Ventricle Relative Wall Thickness 0.35 cm    LVOT diameter 1.89 cm    LVOT area 2.80 cm2    TR Max Korey 3.53 m/s    LV Systolic Volume 96.84 mL    LV Systolic Volume Index 42.8 mL/m2    LV Diastolic Volume 145.99 mL    LV Diastolic Volume Index 64.50 mL/m2    LA Volume Index 50.2 mL/m2    LV Mass Index 86.9 g/m2    RA Major Axis 5.54 cm    Left Atrium Minor Axis 5.77 cm    Left Atrium Major Axis 5.99 cm    Triscuspid Valve Regurgitation Peak Gradient 49.84 mmHg    RA Width 4.67 cm     Assessment and Plan:     Brief HPI:     * Cardiac arrest    Unclear precipitant, possible vagal vs PEA arrest?  [employer] PPM interrogated 12/11/18, no tachy events.       CAD (coronary artery  disease)    Known MV CAD unamenable to CABG or PCI (per notes from 2016)  Severe LV dysfxn noted  [employer] ICD in place  Mild flat trop elev likely demand related in setting of severe CAD, unlikely ACS.     Essential hypertension    Cont med rx as BP will tolerate     HLD (hyperlipidemia)    Cont zetia     Diabetic foot infection    Per IM/ID     Chronic combined systolic and diastolic heart failure    As above     DM type 2 with diabetic peripheral neuropathy    Per IM         VTE Risk Mitigation (From admission, onward)        Ordered     enoxaparin injection 40 mg  Daily      12/12/18 0820     IP VTE HIGH RISK PATIENT  Once      12/10/18 2126        On very low-dose Levophed should be able to wean today.  Add back medicines for secondary prevention as tolerated.    Pernell Greer MD  Cardiology  [facility] [place]

## 2018-12-14 NOTE — ASSESSMENT & PLAN NOTE
Shock appears multifactorial in likely secondary to cardiac decompensation and sedative infusions to facilitate mechanical ventilation. Sedative medications now off. Unlikely septic shock due to appropriate coverage and no new growth in cultures.  -- ID broadened to vancomycin and meropenem. Otherwise he has been afebrile and no additional clear source of secondary infection noted.   -- Wean norepinephrine to maintain map greater than 65.  Very low dose this AM. Could likely wean off today.  -- Continue with diuresis to optimize cardiac function.

## 2018-12-14 NOTE — ASSESSMENT & PLAN NOTE
Unclear etiology. Likely related to hypoxemia in the setting of right ventricular failure and use of sedating medications. TTM not intiated due to patients neurologic response s/p cardiac arrest.  --Continue supportive care  --No neurologic injury

## 2018-12-14 NOTE — ASSESSMENT & PLAN NOTE
Malnutrition in the context of Acute Illness/Injury    Related to (etiology):  Cardiac hx; s/p recent cardiac arrest    Signs and Symptoms (as evidenced by):  Body Fat Depletion: moderate depletion of orbitals and triceps   Muscle Mass Depletion: moderate depletion of temples, clavicle region, interosseous muscle and lower extremities   Fluid Accumulation: moderate    Interventions:  Collaboration w/ other providers    Nutrition Diagnosis Status:  Continues

## 2018-12-14 NOTE — PLAN OF CARE
Problem: Adult Inpatient Plan of Care  Goal: Plan of Care Review  Outcome: Ongoing (interventions implemented as appropriate)  Pt continues in ICU on 3L NC. Pt tmax of 99.3 at start of shift resolving to 98.5. Adequate urine output. Pt received PRN pain medication once during shift. Pt continues on norepinephrine gtt to maintain MAP >65. No new injury or fall noted. Pt shows no signs or symptoms of distress.

## 2018-12-14 NOTE — ASSESSMENT & PLAN NOTE
Abdominal US with ascites  Previous paracentesis in 10/2018 with SAAG 0.9, not consistent with portal hypertension  Repeat paracentesis 12/12 with light green clear fluid. SAAG 0.6. Total protein 4.2.  with 196 PMNs, not consistent with SBP.   Ascites cytology and AFB culture and smear pending  UA negative for protein, UPC not consistent with nephrotic syndrome  Not clinically or radiologically consistent with pancreatitis  GI consulted in 10/2018 with prior paracentesis and suggested it could be due to CHF. Total protein is consistent with this but SAAG is not

## 2018-12-14 NOTE — PLAN OF CARE
Recommendations     1. Cont w/ current diet order as tolerated    2. Given pt's poor nutr status, enc intake of oral nutr suppl daily   Goals: TF initiation or diet advancement w/in 24 hrs  Nutrition Goal Status: goal met

## 2018-12-14 NOTE — ASSESSMENT & PLAN NOTE
Discussed multiple medical problems and code with patient today. Asked him to discuss further with family. Will follow up. Needs LAPOST prior to discharge. Family says he is still legally  but not in contact with wife- likely also needs HCPOA documentation prior to discharge.

## 2018-12-14 NOTE — PROGRESS NOTES
Pt has remained afebrile today. Pt remains in ICU no longer on vent now O2 at 3l/nc. Pt remains on levophed at 0.04mcg/kg/min.Pt with no new skin breakdown noted. Pt remains on contact isolation. Pt tolerated soft diet tonight.Pt has been repositioned q 2hrs.

## 2018-12-14 NOTE — PROGRESS NOTES
"[facility] [place]  Adult Nutrition  Progress Note    SUMMARY       Recommendations    1. Cont w/ current diet order as tolerated    2. Given pt's poor nutr status, enc intake of oral nutr suppl daily   Goals: TF initiation or diet advancement w/in 24 hrs  Nutrition Goal Status: goal met  Communication of RD Recs: discussed on rounds    Reason for Assessment    Reason For Assessment: RD follow-up  Diagnosis: cardiac disease  Relevant Medical History: CKD, CAD, DMII, HLD, HTN, PVD  Interdisciplinary Rounds: attended  General Information Comments: Pt extubated yesterday. Cont's to require pressor support. Diet advanced yest afternoon & pt it tolerating it well w/ no c/o intolerance. 2.4L fl removed 12/12 w/ paracentesis. RN reports pt ate ~100% of breakfast & lunch trays today. NFPE performed 12/12; see malnutr section for details.   Nutrition Discharge Planning: Adequate intake of meals to meet nutr needs    Nutrition Risk Screen    Nutrition Risk Screen: large or nonhealing wound, burn or pressure injury    Nutrition/Diet History    Patient Reported Diet/Restrictions/Preferences: diabetic diet, heart healthy(per daughter's report)  Spiritual, Cultural Beliefs, Anabaptist Practices, Values that Affect Care: no  Factors Affecting Nutritional Intake: on mechanical ventilation    Anthropometrics    Temp: 99 °F (37.2 °C)  Height Method: Stated  Height: 5' 10" (177.8 cm)  Height (inches): 70 in  Weight Method: Bed Scale  Weight: 99.8 kg (220 lb 0.3 oz)  Weight (lb): 220.02 lb  Ideal Body Weight (IBW), Male: 166 lb  % Ideal Body Weight, Male (lb): 132.54 lb  BMI (Calculated): 31.6  Weight Loss: other (see comments)(wt gain pta 2/2 fluid)  Weight Loss Since Admission: (wt loss since admission 2/2 fluid staus (-5.5L))       Lab/Procedures/Meds    Pertinent Labs Reviewed: reviewed  Pertinent Labs Comments: Glu 180, POCT glu 173-194, Alb 1.9  Pertinent Medications Reviewed: reviewed  Pertinent Medications Comments: " levo, abx, lasix, omega 3's, senna-docusate    Physical Findings/Assessment  Diabetic foot wound       Estimated/Assessed Needs    Weight Used For Calorie Calculations: 99.8 kg (220 lb 0.3 oz)  Energy Calorie Requirements (kcal): 1814  Energy Need Method: [place] Jeor     Protein Requirements: 100 gms (1gm/kg)  Weight Used For Protein Calculations: 99.8 kg (220 lb 0.3 oz)     Estimated Fluid Requirement Method: RDA Method  RDA Method (mL): 1814  Estimated Fiber Requirements: 25-30 gms/day      Nutrition Prescription Ordered    Current Diet Order: Bariatric Soft/cardiac/consistent CHO    Evaluation of Received Nutrient/Fluid Intake    Other Calories (kcal): 0  I/O: -1.158L x 24 hrs; - 5.495L since admit  Energy Calories Required: not meeting needs  Protein Required: not meeting needs  Fluid Required: other (see comments)(per MD)  Comments: LBM 12/10; good uop  Tolerance: tolerating  % Intake of Estimated Energy Needs: 75 - 100 %  % Meal Intake: 75 - 100 %    Nutrition Risk    Level of Risk/Frequency of Follow-up: (F/u 1 x weekly)     Assessment and Plan    Severe malnutrition    Malnutrition in the context of Acute Illness/Injury    Related to (etiology):  Cardiac hx; s/p recent cardiac arrest    Signs and Symptoms (as evidenced by):  Body Fat Depletion: moderate depletion of orbitals and triceps   Muscle Mass Depletion: moderate depletion of temples, clavicle region, interosseous muscle and lower extremities   Fluid Accumulation: moderate    Interventions:  Commercial beverage    Nutrition Diagnosis Status:  Continues              Monitor and Evaluation    Food and Nutrient Intake: food and beverage intake, energy intake  Food and Nutrient Adminstration: diet order  Physical Activity and Function: nutrition-related ADLs and IADLs  Anthropometric Measurements: weight change, weight  Biochemical Data, Medical Tests and Procedures: electrolyte and renal panel, glucose/endocrine profile, inflammatory  profile  Nutrition-Focused Physical Findings: overall appearance     Malnutrition Assessment  Malnutrition Type: acute illness or injury  Skin (Micronutrient): wounds unhealed           Orbital Region (Subcutaneous Fat Loss): moderate depletion  Upper Arm Region (Subcutaneous Fat Loss): mild depletion   [place] Region (Muscle Loss): moderate depletion  Clavicle Bone Region (Muscle Loss): moderate depletion  Clavicle and Acromion Bone Region (Muscle Loss): moderate depletion  Dorsal Hand (Muscle Loss): mild depletion  Anterior Thigh Region (Muscle Loss): mild depletion   Edema (Fluid Accumulation): 3-->moderate   Subcutaneous Fat Loss (Final Summary): severe protein-calorie malnutrition  Muscle Loss Evaluation (Final Summary): severe protein-calorie malnutrition         Nutrition Follow-Up    RD Follow-up?: Yes

## 2018-12-14 NOTE — ASSESSMENT & PLAN NOTE
Patient has severe 3v disease per [facility] in 2016 with no targets for PCI or CABG  Continue asa. Add plavix (discussed with Juan Antonio). Continue lipid management. Hold BB and ACEi for hypotension  With elevated troponin- see elsewhere

## 2018-12-14 NOTE — ASSESSMENT & PLAN NOTE
Tissue culture obtained by Vascular Surgery obtained 12/5/18 grew Acinetobacter baumannii/haemolyticus sensitive to meropenem.    ID consulted- recommend meropenem and vanc x6 weeks   PICC in place R arm for outpatient antibiotics  Wound care following  ESR 72, CRP 89 on 12/11- trend weekly  Poor wound healing likely given PAD and DM with extensive wound. Discussed with patient on 12/14  Will need ID, Vascular, Wound care follow up after discharge

## 2018-12-14 NOTE — SUBJECTIVE & OBJECTIVE
Interval History: Extubated yesterday to bipap. Now on 3L NC tolerating well. No complaints this AM.     Objective:     Vital Signs (Most Recent):  Temp: 99 °F (37.2 °C) (12/14/18 0701)  Pulse: 83 (12/14/18 1045)  Resp: 13 (12/14/18 1045)  BP: (!) 116/57 (12/14/18 1000)  SpO2: 97 % (12/14/18 1045) Vital Signs (24h Range):  Temp:  [98.5 °F (36.9 °C)-99.3 °F (37.4 °C)] 99 °F (37.2 °C)  Pulse:  [] 83  Resp:  [11-23] 13  SpO2:  [91 %-100 %] 97 %  BP: (109-139)/(55-96) 116/57  Arterial Line BP: ()/(43-61) 114/51     Weight: 99.8 kg (220 lb 0.3 oz)  Body mass index is 31.57 kg/m².      Intake/Output Summary (Last 24 hours) at 12/14/2018 1508  Last data filed at 12/14/2018 0600  Gross per 24 hour   Intake 676.58 ml   Output 795 ml   Net -118.42 ml       Physical Exam   Constitutional: He is oriented to person, place, and time. He appears well-developed and well-nourished. No distress. Nasal cannula in place.   HENT:   Head: Normocephalic and atraumatic.   Eyes: EOM are normal. Right pupil is not round. Right pupil is reactive. Left pupil is not round. Left pupil is reactive.   Neck: Normal range of motion. No tracheal deviation present. No thyromegaly present.   Cardiovascular: Normal rate and regular rhythm. Exam reveals no gallop and no friction rub.   No murmur heard.  Pulses:       Dorsalis pedis pulses are 2+ on the right side.   Pulmonary/Chest: Effort normal and breath sounds normal. No stridor. No tachypnea. No respiratory distress. He has no decreased breath sounds. He has no wheezes. He has no rhonchi. He has no rales.   Abdominal: Soft. Bowel sounds are normal. There is no tenderness.   Musculoskeletal: Normal range of motion.   Right first and second toe amputated  Bandaging covering LLE up to the knee   Neurological: He is alert and oriented to person, place, and time. He has normal strength. No sensory deficit. GCS eye subscore is 4. GCS verbal subscore is 5. GCS motor subscore is 6.   Skin: Skin  is warm and dry.       Vents:  Vent Mode: NIV+ PC (12/13/18 1123)  Ventilator Initiated: Yes (12/11/18 1413)  Set Rate: 12 bmp (12/13/18 1123)  Vt Set: 440 mL (12/13/18 0800)  Pressure Support: 5 cmH20 (12/13/18 1045)  PEEP/CPAP: 5 cmH20 (12/13/18 1123)  Oxygen Concentration (%): 40 (12/13/18 1230)  Peak Airway Pressure: 9.6 cmH2O (12/13/18 1123)  Total Ve: 7.5 mL (12/13/18 1123)  F/VT Ratio<105 (RSBI): (!) 9.12 (12/13/18 1123)    Lines/Drains/Airways     Peripherally Inserted Central Catheter Line                 PICC Double Lumen 12/10/18 2241 right basilic 3 days          Drain                 Open Drain 11/14/18 1353 Left Foot Other (see comments) Other (see comments) 30 days         Urethral Catheter 12/11/18 1351 Double-lumen 16 Fr. 3 days          Airway                 Airway - Non-Surgical 11/16/18 0815 Other (Comment) 28 days          Arterial Line                 Arterial Line 12/11/18 1530 Left Radial 2 days                Significant Labs:    CBC/Anemia Profile:  Recent Labs   Lab 12/13/18  0230 12/14/18  0240   WBC 8.80 6.99   HGB 9.5* 8.9*   HCT 28.7* 27.7*    298   MCV 94 94   RDW 16.7* 16.5*        Chemistries:  Recent Labs   Lab 12/13/18  0230 12/14/18  0240    141   K 3.8 3.6    105   CO2 28 29   BUN 29* 20   CREATININE 0.8 0.7   CALCIUM 8.7 8.4*   ALBUMIN 1.9* 1.9*   PROT 6.7 6.4   BILITOT 0.6 0.7   ALKPHOS 129 123   ALT 14 11   AST 14 14       All pertinent labs within the past 24 hours have been reviewed.    Significant Imaging:  I have reviewed and interpreted all pertinent imaging results/findings within the past 24 hours.

## 2018-12-14 NOTE — SUBJECTIVE & OBJECTIVE
Interval History:  Denies any cardiopulmonary complaints this a.m..  He is eating without any issues.    Review of Systems   All other systems reviewed and are negative.    Objective:     Vital Signs (Most Recent):  Temp: 98.5 °F (36.9 °C) (12/14/18 0315)  Pulse: 77 (12/14/18 0600)  Resp: 15 (12/14/18 0600)  BP: (!) 109/58 (12/14/18 0600)  SpO2: 98 % (12/14/18 0600) Vital Signs (24h Range):  Temp:  [97.7 °F (36.5 °C)-99.3 °F (37.4 °C)] 98.5 °F (36.9 °C)  Pulse:  [77-91] 77  Resp:  [10-22] 15  SpO2:  [80 %-100 %] 98 %  BP: (108-146)/(55-96) 109/58  Arterial Line BP: ()/(38-57) 105/47     Weight: 99.8 kg (220 lb 0.3 oz)  Body mass index is 31.57 kg/m².     SpO2: 98 %  O2 Device (Oxygen Therapy): nasal cannula      Intake/Output Summary (Last 24 hours) at 12/14/2018 0935  Last data filed at 12/14/2018 0600  Gross per 24 hour   Intake 1093.07 ml   Output 1775 ml   Net -681.93 ml       Lines/Drains/Airways     Peripherally Inserted Central Catheter Line                 PICC Double Lumen 12/10/18 2241 right basilic 3 days          Drain                 Open Drain 11/14/18 1353 Left Foot Other (see comments) Other (see comments) 29 days         Urethral Catheter 12/11/18 1351 Double-lumen 16 Fr. 2 days          Airway                 Airway - Non-Surgical 11/16/18 0815 Other (Comment) 28 days          Arterial Line                 Arterial Line 12/11/18 1530 Left Radial 2 days                Physical Exam   Constitutional: He is oriented to person, place, and time. No distress.   Cardiovascular: Normal rate and regular rhythm.   Pulmonary/Chest:   Decreased coarse breath sounds anteriorly   Musculoskeletal: He exhibits no edema.   Neurological: He is alert and oriented to person, place, and time.       Significant Labs:   BMP:   Recent Labs   Lab 12/13/18  0230 12/14/18  0240   * 180*    141   K 3.8 3.6    105   CO2 28 29   BUN 29* 20   CREATININE 0.8 0.7   CALCIUM 8.7 8.4*    and CBC   Recent Labs    Lab 12/13/18  0230 12/14/18  0240   WBC 8.80 6.99   HGB 9.5* 8.9*   HCT 28.7* 27.7*    298       Significant Imaging: Echocardiogram:   Transthoracic echo (TTE) complete (Cupid Only):   Results for orders placed or performed during the hospital encounter of 12/10/18   Transthoracic echo (TTE) complete (Cupid Only)   Result Value Ref Range    BSA 2.33 m2    LA WIDTH 4.70 cm    AORTIC VALVE CUSP SEPERATION 1.95 cm    PV PEAK VELOCITY 0.97 cm/s    LVIDD 5.48 3.5 - 6.0 cm    IVS 0.94 0.6 - 1.1 cm    PW 0.95 0.6 - 1.1 cm    Ao root annulus 3.08 cm    LVIDS 4.59 (A) 2.1 - 4.0 cm    FS 16 28 - 44 %    LA volume 113.65 cm3    Sinus 3.19 cm    STJ 2.25 cm    Ascending aorta 2.60 cm    LV mass 196.74 g    LA size 4.84 cm    RVDD 5.45 cm    TAPSE 1.48 cm    RV S' 11.20 m/s    Left Ventricle Relative Wall Thickness 0.35 cm    LVOT diameter 1.89 cm    LVOT area 2.80 cm2    TR Max Korey 3.53 m/s    LV Systolic Volume 96.84 mL    LV Systolic Volume Index 42.8 mL/m2    LV Diastolic Volume 145.99 mL    LV Diastolic Volume Index 64.50 mL/m2    LA Volume Index 50.2 mL/m2    LV Mass Index 86.9 g/m2    RA Major Axis 5.54 cm    Left Atrium Minor Axis 5.77 cm    Left Atrium Major Axis 5.99 cm    Triscuspid Valve Regurgitation Peak Gradient 49.84 mmHg    RA Width 4.67 cm

## 2018-12-14 NOTE — ASSESSMENT & PLAN NOTE
No evidence to suggest hepatic dysfunction. Suspect the ascites is multifactorial related to hypoalbuminemia, gross volume overload.  Despite the low SAGG, ascities likely due to right ventricular dysfunction.   -- large volume paracentesis 12/12  -- Negative for SBP  -- Last SAAG 0.9; repeat SAAG 0.4; ordered cytology on peritoneal fluid. Would follow up to determine source of ascites.

## 2018-12-14 NOTE — PROGRESS NOTES
[facility]  [place] Note    Patient Name: Marvin Ray  MRN: [id number]  Patient Class: IP- Inpatient   Admission Date: 12/10/2018  Length of Stay: 3 days  Attending Physician: Jazz Sotomayor MD  Primary Care Provider: Rubén Davis MD        Subjective:     Principal Problem:Cardiac arrest    HPI:  58 y.o. male with DM2, HLD, HTN, CAD, chronic combined heart failure, CKD stage III presents with a complaint of left foot wound infection.  Prior cultures show only sensitive to meropenem.  Outpatient IV antibiotics were attempted to be setup last night and throughout the day today through his PCP but were unable to be procured.  He denies fever, chills, cough, SOB, chest pain, palpitations, headaches, vision changes, N/V/D, abdominal pain, or dysuria.  Placed in observation for PICC line placement and to setup home IV abx.    Hospital Course:  Patient admitted for PICC line placement for IV antibiotics for left wound infection -12/5/18 wound culture Acinetobacter baumannii/haemolyticus sensitive to meropenem. IV meropenum started in ED. On 12/11/18, patient cardiac arrest with ROSC after ACLS. Per sister, he was standing getting ready to leave the hospital and then fell back into the bed. Patient intubated on 12/11 and transferred to ICU. Narcan given prior to transfer to ICU.   Sedated with propofol. Hypotensive after arrest requiring levophed that has been weaned. Cardiology and Pulmonary consulted. ICD interrogated- no events, no shocks. Possibly PEA arrest due to severe infection with underlying biventricular failure and severe CAD? TTE with EF 30%, diastolic dysfunction, new RV changes. CT negative for PE. Troponins elevated at 0.5 but downtrended. Initially started asa, plavix, and full dose lovenox with concern for NSTEMI, but unlikely NSTEMI given minimal troponin elevations that could be explained by chest compressions alone; lovenox discontinued. Will continue DAPT  given severe CAD with no intervention (not a candidate for PCI or CABG given anatomy). Blood cultures checked and NGTD. Wound care following for DM foot wounds. Abdominal US with ascites s/p paracentesis with 2.4L of clear light green ascites removed on 12/12. SAAG 0.6 which is NOT consistent with portal hypertension. TP 4.2 which would be consistent with cardiac etiology. No SBP. No pancreatitis on CT abd/pelvis and none clinically either. UA and UPC does not show protein; ascites not from nephrotic syndrome. Cytology and AFB culture and smear on ascitic fluid pending. Patient extubated to BiPAP on 12/13, weaned to nasal cannula. Still requiring low dose levophed.     Interval History: Awake. Complains of pain in chest with coughing, no other complaints. No BMs recently.     Review of Systems   Respiratory: Positive for cough. Negative for chest tightness, shortness of breath and wheezing.    Cardiovascular: Positive for chest pain. Negative for palpitations and leg swelling.   Gastrointestinal: Positive for abdominal distention and constipation. Negative for abdominal pain, diarrhea, nausea and vomiting.   Skin: Positive for wound.     Objective:     Vital Signs (Most Recent):  Temp: 98.5 °F (36.9 °C) (12/14/18 0315)  Pulse: 77 (12/14/18 0600)  Resp: 15 (12/14/18 0600)  BP: (!) 109/58 (12/14/18 0600)  SpO2: 98 % (12/14/18 0600) Vital Signs (24h Range):  Temp:  [97.7 °F (36.5 °C)-99.3 °F (37.4 °C)] 98.5 °F (36.9 °C)  Pulse:  [74-91] 77  Resp:  [10-22] 15  SpO2:  [80 %-100 %] 98 %  BP: (108-146)/(55-96) 109/58  Arterial Line BP: ()/(37-57) 105/47     Weight: 99.8 kg (220 lb 0.3 oz)  Body mass index is 31.57 kg/m².    Intake/Output Summary (Last 24 hours) at 12/14/2018 0828  Last data filed at 12/14/2018 0600  Gross per 24 hour   Intake 1120.63 ml   Output 2120 ml   Net -999.37 ml      Physical Exam   Constitutional: He appears well-developed and well-nourished. No distress.   HENT:   Head: Normocephalic and  atraumatic.   Mouth/Throat: Oropharynx is clear and moist.   ETT and OGT in place   Cardiovascular: Normal rate, regular rhythm, normal heart sounds and intact distal pulses. Exam reveals no gallop and no friction rub.   No murmur heard.  Pulmonary/Chest: Effort normal and breath sounds normal. No stridor. No respiratory distress. He has no wheezes. He has no rales.   Mechanical ventilation   Abdominal: Bowel sounds are normal. He exhibits distension (improved). He exhibits no mass. There is no tenderness. There is no guarding.   Bandage in RLQ dry and intact   Musculoskeletal: He exhibits edema.   Neurological: He is alert.   Oriented to person and place. Not oriented to year. Oriented to situation   Skin: He is not diaphoretic.   Bilateral feet dressed   Nursing note and vitals reviewed.      Significant Labs: All pertinent labs within the past 24 hours have been reviewed.    Significant Imaging: I have reviewed and interpreted all pertinent imaging results/findings within the past 24 hours.    Assessment/Plan:      * Cardiac arrest    On 12/11  PEA? ICD interrogated with no shocks or abnormal rhythms.  Etiology unclear. No PE, no NSTEMI, no electrolyte abnormalities, no hypoxia, no toxins  May be PEA arrest due to severe infection in setting of biventricular cardiomyopathy and severe CAD       Goals of care, counseling/discussion    Discussed multiple medical problems and code with patient today. Asked him to discuss further with family. Will follow up. Needs LAPOST prior to discharge. Family says he is still legally  but not in contact with wife- likely also needs HCPOA documentation prior to discharge.        Acute renal insufficiency    Due to cardiac arrest. Cr up to 1.4 from baseline ~0.8. Now resolved.        Severe malnutrition    Encourage diet when able       Other ascites    Abdominal US with ascites  Previous paracentesis in 10/2018 with SAAG 0.9, not consistent with portal hypertension  Repeat  paracentesis 12/12 with light green clear fluid. SAAG 0.6. Total protein 4.2.  with 196 PMNs, not consistent with SBP.   Ascites cytology and AFB culture and smear pending  UA negative for protein, UPC not consistent with nephrotic syndrome  Not clinically or radiologically consistent with pancreatitis  GI consulted in 10/2018 with prior paracentesis and suggested it could be due to CHF. Total protein is consistent with this but SAAG is not       Elevated troponin    Troponin elevated to 0.5 max with new TWIs  Initially started NSTEMI protocol on 12/11 with concern for possible NSTEMI as cause of cardiac arrest  Troponins downtrended  Discontinued weight based lovenox on 12/12 after discussion with Cardiology       Shock    Hypotensive after code on 12/11  On levophed, requirements decreasing  Exact cause of shock unclear       Acute hypoxemic respiratory failure    Intubated 12/11 post code  Ascites and bilateral effusions cause decreased lung compliance-- paracentesis 12/12 with 2.4L off  Extubated to BiPAP on 12/13  Now weaned to NC       Diabetic foot infection    Tissue culture obtained by Vascular Surgery obtained 12/5/18 grew Acinetobacter baumannii/haemolyticus sensitive to meropenem.    ID consulted- recommend meropenem and vanc x6 weeks   PICC in place R arm for outpatient antibiotics  Wound care following  ESR 72, CRP 89 on 12/11- trend weekly  Poor wound healing likely given PAD and DM with extensive wound. Discussed with patient on 12/14  Will need ID, Vascular, Wound care follow up after discharge     Chronic combined systolic and diastolic heart failure    No evidence of decompensation on admit  Post code BNP elevated, though this is expected  Patient has ascites and bilateral pleural effusions. Does not have significant pulmonary edema  Will change lasix to 40mg IV daily- patient has good UOP and improving Cr with this regimen  Likely will need to add spironolactone for ascites and CHF when  tolerated by BP  Monitor UOP    TTE 12/11  · Severely decreased left ventricular systolic function. The estimated ejection fraction is 20%  · Severe global hypokinetic wall motion. Cannot exclude RWMA.  · Septal wall has abnormal motion. Systolic and diastolic flattening of the interventricular septum consistent with right ventricle pressure and volume overload.  · Left ventricular diastolic dysfunction.  · Mild right ventricular enlargement.  · Mildly to moderately reduced right ventricular systolic function.  · Moderate tricuspid regurgitation.  · There is a large left pleural effusion.     LAYNE (acute kidney injury)    Baseline Cr ~0.9  Cr increased to 1.4 post code, now improved back to baseline  Continue to monitor  Renal dose all meds, avoid nephrotoxins      PVD (peripheral vascular disease)    Continue asa, plavix, lipid reduction meds       CAD (coronary artery disease)    Patient has severe 3v disease per [facility] in 2016 with no targets for PCI or CABG  Continue asa. Add plavix (discussed with Juan Antonio). Continue lipid management. Hold BB and ACEi for hypotension  With elevated troponin- see elsewhere     Essential hypertension    Currently hypotensive on levophed  Hold all BP meds     Tophaceous gout    Continue home allopurinol       Neovascular glaucoma of both eyes    Continue eye drops       HLD (hyperlipidemia)    Last lipids 10/2018 are well controlled  Continue home regimen of ezetimibe and fish oil (statin intolerance)     DM type 2 with diabetic peripheral neuropathy    Last HgbA1c   Lab Results   Component Value Date    HGBA1C 8.3 (H) 10/30/2018   Hold oral antihyperglycemics while inpatient    Meds: detemir 5 + SSI  ACHS accuchecks, ADA diet, hypoglycemic protocol          VTE Risk Mitigation (From admission, onward)        Ordered     enoxaparin injection 40 mg  Daily      12/12/18 0820     IP VTE HIGH RISK PATIENT  Once      12/10/18 4185          Critical care time spent on the evaluation and  treatment of severe organ dysfunction, review of pertinent labs and imaging studies, discussions with consulting providers and discussions with patient/family: 45 minutes.    Jazz Sotomayor MD  Department of Hospital Medicine   [facility]

## 2018-12-14 NOTE — PROGRESS NOTES
Nursing performing wound care to left foot with Santyl daily. Heel suspended off bed with pillow. Encouraged patient to perform small shifts in bed to offload pressure from sacrum. Voiced understanding.

## 2018-12-14 NOTE — ASSESSMENT & PLAN NOTE
Intubated 12/11 post code  Ascites and bilateral effusions cause decreased lung compliance-- paracentesis 12/12 with 2.4L off  Extubated to BiPAP on 12/13  Now weaned to NC

## 2018-12-14 NOTE — ASSESSMENT & PLAN NOTE
Likely secondary to cardiac arrest and ongoing cardiac decompensation and gross volume overload with impaired diaphragmatic excursion secondary to tense abdominal ascites. Oxygenation improved s/p paracentesis 12/12/2018  -- continue with diuresis as tolerated  -- follow up paracentesis fluid cytology  -- Extubated 12/13/2018; now on 3L NC and tolerating well

## 2018-12-14 NOTE — ASSESSMENT & PLAN NOTE
Secondary to DM/PVD- s/p revascularization on 11/13/2018 including a left SFA, AT and peroneal angioplasty to improve perfusion to his left foot due to extensive left foot wound. Wound again debrided by vascular on November 29th and 12/5.  Cultures with Acinetobacter.  Continue IV Meropenem and vancomycin for 6 weeks per ID

## 2018-12-15 LAB
ALBUMIN SERPL BCP-MCNC: 1.7 G/DL
ALP SERPL-CCNC: 108 U/L
ALT SERPL W/O P-5'-P-CCNC: 9 U/L
ANION GAP SERPL CALC-SCNC: 8 MMOL/L
AST SERPL-CCNC: 14 U/L
BASOPHILS # BLD AUTO: 0.03 K/UL
BASOPHILS NFR BLD: 0.5 %
BILIRUB SERPL-MCNC: 0.5 MG/DL
BUN SERPL-MCNC: 23 MG/DL
CALCIUM SERPL-MCNC: 8.1 MG/DL
CHLORIDE SERPL-SCNC: 104 MMOL/L
CO2 SERPL-SCNC: 29 MMOL/L
CREAT SERPL-MCNC: 0.7 MG/DL
DIFFERENTIAL METHOD: ABNORMAL
EOSINOPHIL # BLD AUTO: 0.3 K/UL
EOSINOPHIL NFR BLD: 3.9 %
ERYTHROCYTE [DISTWIDTH] IN BLOOD BY AUTOMATED COUNT: 16.2 %
EST. GFR  (AFRICAN AMERICAN): >60 ML/MIN/1.73 M^2
EST. GFR  (NON AFRICAN AMERICAN): >60 ML/MIN/1.73 M^2
GLUCOSE SERPL-MCNC: 196 MG/DL
HCT VFR BLD AUTO: 26.8 %
HGB BLD-MCNC: 8.6 G/DL
LYMPHOCYTES # BLD AUTO: 0.6 K/UL
LYMPHOCYTES NFR BLD: 9.1 %
MCH RBC QN AUTO: 30.5 PG
MCHC RBC AUTO-ENTMCNC: 32.1 G/DL
MCV RBC AUTO: 95 FL
MONOCYTES # BLD AUTO: 0.7 K/UL
MONOCYTES NFR BLD: 10.3 %
NEUTROPHILS # BLD AUTO: 4.9 K/UL
NEUTROPHILS NFR BLD: 76.2 %
PLATELET # BLD AUTO: 252 K/UL
PMV BLD AUTO: 8.3 FL
POCT GLUCOSE: 156 MG/DL (ref 70–110)
POCT GLUCOSE: 215 MG/DL (ref 70–110)
POCT GLUCOSE: 231 MG/DL (ref 70–110)
POCT GLUCOSE: 238 MG/DL (ref 70–110)
POTASSIUM SERPL-SCNC: 3.7 MMOL/L
PROT SERPL-MCNC: 6.1 G/DL
RBC # BLD AUTO: 2.82 M/UL
SODIUM SERPL-SCNC: 141 MMOL/L
WBC # BLD AUTO: 6.4 K/UL

## 2018-12-15 PROCEDURE — 80053 COMPREHEN METABOLIC PANEL: CPT

## 2018-12-15 PROCEDURE — 25000003 PHARM REV CODE 250: Performed by: INTERNAL MEDICINE

## 2018-12-15 PROCEDURE — 25000003 PHARM REV CODE 250: Performed by: NURSE PRACTITIONER

## 2018-12-15 PROCEDURE — 63600175 PHARM REV CODE 636 W HCPCS: Performed by: HOSPITALIST

## 2018-12-15 PROCEDURE — G8979 MOBILITY GOAL STATUS: HCPCS | Mod: CK

## 2018-12-15 PROCEDURE — 97530 THERAPEUTIC ACTIVITIES: CPT

## 2018-12-15 PROCEDURE — 97161 PT EVAL LOW COMPLEX 20 MIN: CPT

## 2018-12-15 PROCEDURE — 36415 COLL VENOUS BLD VENIPUNCTURE: CPT

## 2018-12-15 PROCEDURE — G8987 SELF CARE CURRENT STATUS: HCPCS | Mod: CK

## 2018-12-15 PROCEDURE — 63600175 PHARM REV CODE 636 W HCPCS: Performed by: INTERNAL MEDICINE

## 2018-12-15 PROCEDURE — 25000003 PHARM REV CODE 250: Performed by: HOSPITALIST

## 2018-12-15 PROCEDURE — 25000003 PHARM REV CODE 250: Performed by: EMERGENCY MEDICINE

## 2018-12-15 PROCEDURE — 85025 COMPLETE CBC W/AUTO DIFF WBC: CPT

## 2018-12-15 PROCEDURE — G8988 SELF CARE GOAL STATUS: HCPCS | Mod: CI

## 2018-12-15 PROCEDURE — 99232 SBSQ HOSP IP/OBS MODERATE 35: CPT | Mod: 24,,, | Performed by: INTERNAL MEDICINE

## 2018-12-15 PROCEDURE — G8978 MOBILITY CURRENT STATUS: HCPCS | Mod: CL

## 2018-12-15 PROCEDURE — 20000000 HC ICU ROOM

## 2018-12-15 PROCEDURE — A4216 STERILE WATER/SALINE, 10 ML: HCPCS | Performed by: INTERNAL MEDICINE

## 2018-12-15 PROCEDURE — 63600175 PHARM REV CODE 636 W HCPCS: Performed by: NURSE PRACTITIONER

## 2018-12-15 PROCEDURE — 97535 SELF CARE MNGMENT TRAINING: CPT

## 2018-12-15 PROCEDURE — 97165 OT EVAL LOW COMPLEX 30 MIN: CPT

## 2018-12-15 RX ORDER — INSULIN ASPART 100 [IU]/ML
3 INJECTION, SOLUTION INTRAVENOUS; SUBCUTANEOUS
Status: DISCONTINUED | OUTPATIENT
Start: 2018-12-15 | End: 2018-12-20 | Stop reason: HOSPADM

## 2018-12-15 RX ORDER — CARVEDILOL 3.12 MG/1
3.12 TABLET ORAL 2 TIMES DAILY
Status: DISCONTINUED | OUTPATIENT
Start: 2018-12-15 | End: 2018-12-20 | Stop reason: HOSPADM

## 2018-12-15 RX ADMIN — VANCOMYCIN HYDROCHLORIDE 1500 MG: 1 INJECTION, POWDER, LYOPHILIZED, FOR SOLUTION INTRAVENOUS at 12:12

## 2018-12-15 RX ADMIN — MEROPENEM 2 G: 1 INJECTION, POWDER, FOR SOLUTION INTRAVENOUS at 10:12

## 2018-12-15 RX ADMIN — MEROPENEM 2 G: 1 INJECTION, POWDER, FOR SOLUTION INTRAVENOUS at 06:12

## 2018-12-15 RX ADMIN — Medication: at 09:12

## 2018-12-15 RX ADMIN — ASPIRIN 81 MG 81 MG: 81 TABLET ORAL at 08:12

## 2018-12-15 RX ADMIN — MEROPENEM 2 G: 1 INJECTION, POWDER, FOR SOLUTION INTRAVENOUS at 02:12

## 2018-12-15 RX ADMIN — DORZOLAMIDE HYDROCHLORIDE AND TIMOLOL MALEATE 1 DROP: 20; 5 SOLUTION OPHTHALMIC at 03:12

## 2018-12-15 RX ADMIN — DORZOLAMIDE HYDROCHLORIDE AND TIMOLOL MALEATE 1 DROP: 20; 5 SOLUTION OPHTHALMIC at 08:12

## 2018-12-15 RX ADMIN — STANDARDIZED SENNA CONCENTRATE AND DOCUSATE SODIUM 1 TABLET: 8.6; 5 TABLET, FILM COATED ORAL at 09:12

## 2018-12-15 RX ADMIN — STANDARDIZED SENNA CONCENTRATE AND DOCUSATE SODIUM 1 TABLET: 8.6; 5 TABLET, FILM COATED ORAL at 08:12

## 2018-12-15 RX ADMIN — FUROSEMIDE 40 MG: 10 INJECTION, SOLUTION INTRAMUSCULAR; INTRAVENOUS at 08:12

## 2018-12-15 RX ADMIN — OMEGA-3 FATTY ACIDS CAP 1000 MG 1 CAPSULE: 1000 CAP at 09:12

## 2018-12-15 RX ADMIN — INSULIN ASPART 3 UNITS: 100 INJECTION, SOLUTION INTRAVENOUS; SUBCUTANEOUS at 05:12

## 2018-12-15 RX ADMIN — CARVEDILOL 3.12 MG: 3.12 TABLET, FILM COATED ORAL at 11:12

## 2018-12-15 RX ADMIN — INSULIN ASPART 4 UNITS: 100 INJECTION, SOLUTION INTRAVENOUS; SUBCUTANEOUS at 04:12

## 2018-12-15 RX ADMIN — OMEGA-3 FATTY ACIDS CAP 1000 MG 1 CAPSULE: 1000 CAP at 08:12

## 2018-12-15 RX ADMIN — INSULIN ASPART 4 UNITS: 100 INJECTION, SOLUTION INTRAVENOUS; SUBCUTANEOUS at 11:12

## 2018-12-15 RX ADMIN — COLLAGENASE SANTYL: 250 OINTMENT TOPICAL at 09:12

## 2018-12-15 RX ADMIN — BRIMONIDINE TARTRATE 1 DROP: 1 SOLUTION/ DROPS OPHTHALMIC at 08:12

## 2018-12-15 RX ADMIN — PREDNISOLONE ACETATE 1 DROP: 10 SUSPENSION/ DROPS OPHTHALMIC at 08:12

## 2018-12-15 RX ADMIN — CARVEDILOL 3.12 MG: 3.12 TABLET, FILM COATED ORAL at 09:12

## 2018-12-15 RX ADMIN — Medication 10 ML: at 11:12

## 2018-12-15 RX ADMIN — ALLOPURINOL 200 MG: 100 TABLET ORAL at 08:12

## 2018-12-15 RX ADMIN — ENOXAPARIN SODIUM 40 MG: 100 INJECTION SUBCUTANEOUS at 05:12

## 2018-12-15 RX ADMIN — INSULIN ASPART 2 UNITS: 100 INJECTION, SOLUTION INTRAVENOUS; SUBCUTANEOUS at 08:12

## 2018-12-15 RX ADMIN — Medication 10 ML: at 06:12

## 2018-12-15 RX ADMIN — Medication 10 ML: at 07:12

## 2018-12-15 RX ADMIN — Medication 10 ML: at 12:12

## 2018-12-15 RX ADMIN — CLOPIDOGREL BISULFATE 75 MG: 75 TABLET ORAL at 08:12

## 2018-12-15 RX ADMIN — BRIMONIDINE TARTRATE 1 DROP: 1 SOLUTION/ DROPS OPHTHALMIC at 09:12

## 2018-12-15 RX ADMIN — INSULIN ASPART 2 UNITS: 100 INJECTION, SOLUTION INTRAVENOUS; SUBCUTANEOUS at 09:12

## 2018-12-15 RX ADMIN — DORZOLAMIDE HYDROCHLORIDE AND TIMOLOL MALEATE 1 DROP: 20; 5 SOLUTION OPHTHALMIC at 09:12

## 2018-12-15 RX ADMIN — EZETIMIBE 10 MG: 10 TABLET ORAL at 08:12

## 2018-12-15 NOTE — SUBJECTIVE & OBJECTIVE
Interval History: Awake and alert. No complaints.     Review of Systems   Respiratory: Negative for cough, chest tightness, shortness of breath and wheezing.    Cardiovascular: Negative for chest pain, palpitations and leg swelling.   Gastrointestinal: Negative for abdominal distention, abdominal pain, constipation, diarrhea, nausea and vomiting.   Skin: Positive for wound.     Objective:     Vital Signs (Most Recent):  Temp: 98.8 °F (37.1 °C) (12/15/18 0315)  Pulse: 89 (12/15/18 1147)  Resp: 15 (12/15/18 0600)  BP: (!) 115/59 (12/15/18 1147)  SpO2: 97 % (12/15/18 0600) Vital Signs (24h Range):  Temp:  [98.3 °F (36.8 °C)-98.8 °F (37.1 °C)] 98.8 °F (37.1 °C)  Pulse:  [] 89  Resp:  [11-22] 15  SpO2:  [84 %-99 %] 97 %  BP: ()/(52-69) 115/59  Arterial Line BP: ()/(44-59) 104/53     Weight: 99.8 kg (220 lb 0.3 oz)  Body mass index is 31.57 kg/m².    Intake/Output Summary (Last 24 hours) at 12/15/2018 1231  Last data filed at 12/15/2018 0730  Gross per 24 hour   Intake 738.6 ml   Output 900 ml   Net -161.4 ml      Physical Exam   Constitutional: He is oriented to person, place, and time. He appears well-developed and well-nourished. No distress.   HENT:   Head: Normocephalic and atraumatic.   Mouth/Throat: Oropharynx is clear and moist.   ETT and OGT in place   Cardiovascular: Normal rate, regular rhythm, normal heart sounds and intact distal pulses. Exam reveals no gallop and no friction rub.   No murmur heard.  Ectopy on telemetry   Pulmonary/Chest: Effort normal and breath sounds normal. No stridor. No respiratory distress. He has no wheezes. He has no rales.   Mechanical ventilation   Abdominal: Bowel sounds are normal. He exhibits distension (improved). He exhibits no mass. There is no tenderness. There is no guarding.   Bandage in RLQ dry and intact   Musculoskeletal: He exhibits edema.   Neurological: He is alert and oriented to person, place, and time.   Skin: He is not diaphoretic.   Bilateral  feet dressed   Nursing note and vitals reviewed.      Significant Labs: All pertinent labs within the past 24 hours have been reviewed.    Significant Imaging: I have reviewed and interpreted all pertinent imaging results/findings within the past 24 hours.

## 2018-12-15 NOTE — PT/OT/SLP EVAL
Occupational Therapy   Evaluation    Name: Marvin Ray  MRN: [id number]  Admitting Diagnosis:  Cardiac arrest      Recommendations:     Discharge Recommendations: nursing facility, skilled  Discharge Equipment Recommendations:  none  Barriers to discharge:  Decreased caregiver support    History:     Occupational Profile:  Living Environment: The pt lives with his sister in a H with a ramp  Previous level of function: Mod I  Roles and Routines: Retired  Equipment Used at Home:  walker, rolling, bedside commode, wheelchair, bath bench, crutches  Assistance upon Discharge: The pt will have some assistance from his sister upon discharge, but she is not able to lift him in order to transfer him into a wheelchair.    Past Medical History:   Diagnosis Date    Arthritis     Cellulitis     CKD (chronic kidney disease), stage III     Coronary artery disease     Diabetes mellitus     Diabetic retinopathy     Diabetic ulcer of left foot     Glaucoma     Gout     Hyperlipemia     Hypertension     ICD (implantable cardioverter-defibrillator) in place 11/02/2018    Left chest    Non-pressure chronic ulcer of other part of left foot with fat layer exposed 10/23/2018    PVD (peripheral vascular disease)     Type 2 diabetes mellitus with left diabetic foot ulcer 10/29/2018    Unsteady gait     uses a wheelchair       Past Surgical History:   Procedure Laterality Date    AHMED GLAUCOMA IMPLANT Left 2011    DONE AT [facility]    AMPUTATION, TOE Left 12/5/2018    [facility]formed by [facility] at [facility] OR    AMPUTATION, TOES  2-5 Left 11/16/2018    [facility]formed [facility] at [facility] OR    BAERVELDT GLAUCOMA IMPLANT Left 2012    WITH CATARACT EXTRACTION//DONE AT [facility]    CARDIAC CATHETERIZATION Left 05/2016    CARDIAC DEFIBRILLATOR PLACEMENT Left 11/02/2018    CATARACT EXTRACTION W/  INTRAOCULAR LENS IMPLANT Left 2012    WITH BAERVEDT//DONE AT [facility]    CATARACT EXTRACTION W/  INTRAOCULAR  LENS IMPLANT Right 09/26/2018    COMPLEX ()    CB DESTRUCTION WITH CYCLO G6 Left 02/15/2017        CYST REMOVAL      Exam under anesthesia, left foot debridement, washout and all other indicated procedures Left 12/5/2018    Performed by [facility] at [facility] OR    EXAMINATION UNDER ANESTHESIA Left 12/5/2018    Procedure: Exam under anesthesia, left foot debridement, washout and all other indicated procedures;  Surgeon: Mitch Chan MD;  Location: [facility] OR;  Service: Vascular;  Laterality: Left;  1030AM START  RN PREOP 12/3/2018-----AICD  SEEN BY DR GREER 12/4    HEART CATH-LEFT N/A 5/6/2016    Performed by [facility]ana [facility] at [facility] CATH LAB    INCISION AND DRAINAGE Left 11/14/2018    Procedure: Incision and Drainage, left lower extremity debridement, washout;  Surgeon: Mitch Chan MD;  Location: [facility] OR;  Service: Vascular;  Laterality: Left;    INCISION AND DRAINAGE Left 11/16/2018    Procedure: INCISION AND DRAINAGE;  Surgeon: Mitch Chan MD;  Location: [facility] OR;  Service: Vascular;  Laterality: Left;    INCISION AND DRAINAGE Left 11/16/2018    Performed by Mitch Chan MD at [facility] OR    Incision and Drainage, left lower extremity debridement, washout Left 11/14/2018    Performed by Mitch Chan MD at [facility] OR    INSERTION, ICD GENERATOR, SINGLE CHAMBER N/A 11/2/2018    Performed by Pernell Greer MD at [facility] CATH LAB    INSERTION, IOL PROSTHESIS Right 9/26/2018    [facility]    INTRAOCULAR PROSTHESES INSERTION Right 9/26/2018    Procedure: INSERTION, IOL PROSTHESIS;  Surgeon: Perla Cortés MD;  Location: [facility] OR [address] [place] [address];  Service: Ophthalmology;  Laterality: Right;    PHACOEMULSIFICATION OF CATARACT Right 9/26/2018    Procedure: PHACOEMULSIFICATION, CATARACT;  Surgeon: Perla Cortés MD;  [facility];  Service: Ophthalmology;  Laterality: Right;     PHACOEMULSIFICATION, CATARACT Right 9/26/2018    Performed by [facility]la Cort[facility] at [facility] OR 1ST FLR    Right foot surgery  10/2014    TOE AMPUTATION Right     first and second    TOE AMPUTATION Left 11/16/2018    Procedure: AMPUTATION, TOES  2-5;  Surgeon: Mitch Chan MD;  Location: [facility] OR;  Service: Vascular;  Laterality: Left;    TOE AMPUTATION Left 12/5/2018    Procedure: AMPUTATION, TOE;  Surgeon: Mitch Chan MD;  Location: [facility] OR;  Service: Vascular;  Laterality: Left;  left great toe    TONSILLECTOMY      TRABECULECTOMY/G 6 LASER Left 2/15/2017    Performed by Perla Cortés MD at [facility] OR 1ST FLR       Subjective     Chief Complaint: None  Patient/Family Comments/goals: The pt would like to return to his PLOF    Pain/Comfort:  · Pain Rating 1: 0/10    Patients cultural, spiritual, Restorationist conflicts given the current situation: no    Objective:     Communicated with: Brenda prior to session.  Patient found with: all lines intact, call button in reach and family present and blood pressure cuff, telemetry, peripheral IV, oxygen upon OT entry to room.    General Precautions: Standard, fall   Orthopedic Precautions:LLE non weight bearing   Braces: N/A     Occupational Performance:    Bed Mobility:    · Patient completed Rolling/Turning to Left with  moderate assistance  · Patient completed Rolling/Turning to Right with moderate assistance  · Patient completed Scooting/Bridging with moderate assistance  · Patient completed Supine to Sit with moderate assistance    Functional Mobility/Transfers:  · Patient completed Sit <> Stand Transfer with maximal assistance and of 2 persons  with  rolling walker   · Functional Mobility: Not tested    Activities of Daily Living:  · Grooming: stand by assistance   · Upper Body Dressing: minimum assistance     Cognitive/Visual Perceptual:  Cognitive/Psychosocial Skills:     -       Oriented to: Person, Place, Time and Situation     Physical  "Exam:  Upper Extremity Range of Motion:     -       Right Upper Extremity: WFL  -       Left Upper Extremity: WFL  Upper Extremity Strength:    -       Right Upper Extremity: WFL  -       Left Upper Extremity: WFL    AMPAC 6 Click ADL:  AMPAC Total Score: 16    Treatment & Education:  The pt performed g/h, and upper extremity dressing.  The pt and family were also educated on the role of OT.  Education:    Patient left HOB elevated with nursing and family present. present    Assessment:     Marvin Ray is a 58 y.o. male with a medical diagnosis of Cardiac arrest.  He presents with the following performance deficits affecting function: weakness, impaired endurance, impaired self care skills, impaired functional mobilty, impaired balance, gait instability, decreased coordination, decreased upper extremity function, decreased lower extremity function, decreased safety awareness, impaired fine motor, impaired cardiopulmonary response to activity, impaired muscle length, orthopedic precautions.      Rehab Prognosis: Good; patient would benefit from acute skilled OT services to address these deficits and reach maximum level of function.         Clinical Decision Makin.  OT Low:  "Pt evaluation falls under low complexity for evaluation coding due to performance deficits noted in 1-3 areas as stated above and 0 co-morbities affecting current functional status. Data obtained from problem focused assessments. No modifications or assistance was required for completion of evaluation. Only brief occupational profile and history review completed."     Plan:     Patient to be seen 5 x/week to address the above listed problems via self-care/home management, therapeutic activities, therapeutic exercises, neuromuscular re-education  · Plan of Care Expires: 18  · Plan of Care Reviewed with: patient, sibling, family    This Plan of care has been discussed with the patient who was involved in its development and " understands and is in agreement with the identified goals and treatment plan    GOALS:   Multidisciplinary Problems     Occupational Therapy Goals        Problem: Occupational Therapy Goal    Goal Priority Disciplines Outcome Interventions   Occupational Therapy Goal     OT, PT/OT Ongoing (interventions implemented as appropriate)    Description:  Goals to be met by: 12/29/18    Patient will increase functional independence with ADLs by performing:    UE Dressing with Set-up Assistance.  LE Dressing with Set-up Assistance.  Grooming while standing at sink with Set-up Assistance.  Toileting from bedside commode with Stand-by Assistance for hygiene and clothing management.   Toilet transfer to bedside commode with Supervision.  Upper extremity exercise program with supervision.                      Time Tracking:     OT Date of Treatment: 12/15/18  OT Start Time: 1232  OT Stop Time: 1316  OT Total Time (min): 44 min    Billable Minutes:Evaluation 15 minute cotreat with PT  Self Care/Home Management 8 minutes    Trudy Antonio OT  12/15/2018

## 2018-12-15 NOTE — ASSESSMENT & PLAN NOTE
Hypotensive after code on 12/11 and required levophed. Levophed off since 12/14 at 5pm  Exact cause of shock unclear

## 2018-12-15 NOTE — ASSESSMENT & PLAN NOTE
Intubated 12/11 post code  Ascites and bilateral effusions cause decreased lung compliance-- paracentesis 12/12 with 2.4L off  Extubated to BiPAP on 12/13  Now weaned to NC, likely can be weaned further to room air

## 2018-12-15 NOTE — PLAN OF CARE
Problem: Adult Inpatient Plan of Care  Goal: Plan of Care Review  Outcome: Ongoing (interventions implemented as appropriate)  Pt remains in ICU. Levophed stopped at 1700. BP 90's-120's. O2 sats high 90's on O2 at 3l/nc. Pt has remained afebrile today. Pt has remained safe and free of injury. Pt has stage 2 ulcer in gluteal fold, foam drsg maintained and changed today. Pt with good appetite.

## 2018-12-15 NOTE — PLAN OF CARE
Problem: Physical Therapy Goal  Goal: Physical Therapy Goal  Goals to be met by: 2018     Patient will increase functional independence with mobility by performin. Supine to sit with Modified [place]  2. Sit to stand transfer with Minimal Assistance  3. Gait to e assessed.   4. Lower extremity exercise program x10 reps per handout, with supervision  5.  Bed to chair t/f with Min A  6.  W/C propulsion x 150' with Supervision    Outcome: Ongoing (interventions implemented as appropriate)  Initial Pt evaluation performed.  Pt could benefit from daily skilled PT services in order to maximize function prior to D/C.  SNF recommended.

## 2018-12-15 NOTE — ASSESSMENT & PLAN NOTE
Patient has severe 3v disease per [facility] in 2016 with no targets for PCI or CABG  Continue asa. Add plavix (discussed with Juan Antonio). Continue lipid management. Hold BB and ACEi for hypotension  With elevated troponin- see elsewhere

## 2018-12-15 NOTE — PLAN OF CARE
Marvin Ray is a 58 y.o. man with severe 3v CAD not amenable to PCI or CABG, biventricular CHF with AICD in place, CKD3, PAD, DM, L foot wound growing Acinetobacter who was initially admitted to Observation because there were difficulties getting his home antibiotics set up. Patient was getting ready to leave when he had cardiac arrest with ROSC after ACLS on 12/11. Per sister, he was standing getting ready to leave the hospital and then fell back into the bed. Patient intubated on 12/11 and transferred to ICU. Narcan given prior to transfer to ICU. Hypotensive after arrest requiring levophed. Cardiology and Pulmonary consulted. ICD interrogated- no events, no shocks. Possibly PEA arrest due to severe infection with underlying biventricular failure and severe CAD. TTE with EF 30%, diastolic dysfunction, new RV dysfunction. CT negative for PE and bilateral US negative for DVT. Troponins elevated at 0.5 but downtrended. Initially started asa, plavix, and full dose lovenox with concern for NSTEMI, but unlikely NSTEMI given minimal troponin elevations that could be explained by chest compressions alone; lovenox discontinued. Will continue DAPT given severe CAD with no intervention (not a candidate for PCI or CABG given anatomy). Blood cultures checked and NGTD. Wound care following for DM foot wounds. ID added vanc to meropenem with plan to treat for 6 weeks. Abdominal US with ascites s/p paracentesis with 2.4L of clear light green ascites removed on 12/12. SAAG 0.6 which is NOT consistent with portal hypertension. TP 4.2 which would be consistent with cardiac etiology. No SBP. No pancreatitis on CT abd/pelvis and none clinically either. UA and UPC does not show protein; ascites not from nephrotic syndrome. Cytology and AFB culture and smear on ascitic fluid pending. Patient extubated to BiPAP on 12/13, weaned to nasal cannula. Levophed off since 12/14 at 5pm. Continues to improve. Stable for step down to floor  on 12/15.      To do  - when BP tolerates, patient needs BB and spironolactone started for CHF/ascites. Need to transition lasix to PO   - PT, OT consulted. Patient wants to go home  - follow up ascites studies- AFB and cytology. Suspect ascites is cardiac in origin  - needs outpatient meropenem and vanc arranged  - will need multiple outpatient appointments- Vascular, ID, wound care, Cardiology, PCP  - needs HCPOA and LAPOST    Suspect several days of post ICU care    Jazz Sotomayor MD  12/15/2018  12:39 PM

## 2018-12-15 NOTE — PLAN OF CARE
Problem: Adult Inpatient Plan of Care  Goal: Plan of Care Review  Outcome: Ongoing (interventions implemented as appropriate)  Pt continues in ICU on 3L NC. Pt AAOx4. NSR. Pt continues off norepinephrine gtt with adequate blood pressure. Adequate urine output. Pt afebrile. VSS. No new injury or fall noted. Pt  Shows no signs or symptoms of distress.

## 2018-12-15 NOTE — PROGRESS NOTES
[facility]  Cardiology  Progress Note    Patient Name: Marvin Ray  MRN: [id number]  Admission Date: 12/10/2018  Hospital Length of Stay: 4 days  Code Status: Full Code   Attending Physician: Jazz Sotomayor MD   Primary Care Physician: Rubén Davis MD  Expected Discharge Date: 12/11/2018  Principal Problem:Cardiac arrest    Subjective:     Hospital Course:   12/11/18: admitted to ICU after cardiac arrest  12/13: no acute events, low dose levo  12/14:  Off Levophed    Interval History:  No complaints.  Tolerating p.o. and stable off pressor    Telemetry:  Normal sinus rhythm with ectopy    Review of Systems   All other systems reviewed and are negative.    Objective:     Vital Signs (Most Recent):  Temp: 98.8 °F (37.1 °C) (12/15/18 0315)  Pulse: 82 (12/15/18 0600)  Resp: 15 (12/15/18 0600)  BP: (!) 114/59 (12/15/18 0600)  SpO2: 97 % (12/15/18 0600) Vital Signs (24h Range):  Temp:  [98.3 °F (36.8 °C)-98.8 °F (37.1 °C)] 98.8 °F (37.1 °C)  Pulse:  [] 82  Resp:  [11-22] 15  SpO2:  [84 %-99 %] 97 %  BP: ()/(52-69) 114/59  Arterial Line BP: ()/(44-59) 104/53     Weight: 99.8 kg (220 lb 0.3 oz)  Body mass index is 31.57 kg/m².     SpO2: 97 %  O2 Device (Oxygen Therapy): nasal cannula w/ humidification      Intake/Output Summary (Last 24 hours) at 12/15/2018 1120  Last data filed at 12/15/2018 0600  Gross per 24 hour   Intake 746.76 ml   Output 825 ml   Net -78.24 ml       Lines/Drains/Airways     Peripherally Inserted Central Catheter Line                 PICC Double Lumen 12/10/18 2241 right basilic 4 days          Drain                 Open Drain 11/14/18 1353 Left Foot Other (see comments) Other (see comments) 30 days         Urethral Catheter 12/11/18 1351 Double-lumen 16 Fr. 3 days          Airway                 Airway - Non-Surgical 11/16/18 0815 Other (Comment) 29 days          Arterial Line                 Arterial Line 12/11/18 1530 Left Radial 3 days          Pressure  Ulcer                 Pressure Injury 12/14/18 0730 Buttocks Stage 2 1 day                Physical Exam   Constitutional: He is oriented to person, place, and time. No distress.   Cardiovascular: Normal rate and regular rhythm.   Pulmonary/Chest:   Decreased breath sounds anteriorly   Musculoskeletal: He exhibits edema and deformity.   Neurological: He is alert and oriented to person, place, and time.       Significant Labs:   BMP:   Recent Labs   Lab 12/14/18  0240 12/15/18  0215   * 196*    141   K 3.6 3.7    104   CO2 29 29   BUN 20 23*   CREATININE 0.7 0.7   CALCIUM 8.4* 8.1*    and CBC   Recent Labs   Lab 12/14/18  0240 12/15/18  0215   WBC 6.99 6.40   HGB 8.9* 8.6*   HCT 27.7* 26.8*    252       Significant Imaging: Echocardiogram:   Transthoracic echo (TTE) complete (Cupid Only):   Results for orders placed or performed during the hospital encounter of 12/10/18   Transthoracic echo (TTE) complete (Cupid Only)   Result Value Ref Range    BSA 2.33 m2    LA WIDTH 4.70 cm    AORTIC VALVE CUSP SEPERATION 1.95 cm    PV PEAK VELOCITY 0.97 cm/s    LVIDD 5.48 3.5 - 6.0 cm    IVS 0.94 0.6 - 1.1 cm    PW 0.95 0.6 - 1.1 cm    Ao root annulus 3.08 cm    LVIDS 4.59 (A) 2.1 - 4.0 cm    FS 16 28 - 44 %    LA volume 113.65 cm3    Sinus 3.19 cm    STJ 2.25 cm    Ascending aorta 2.60 cm    LV mass 196.74 g    LA size 4.84 cm    RVDD 5.45 cm    TAPSE 1.48 cm    RV S' 11.20 m/s    Left Ventricle Relative Wall Thickness 0.35 cm    LVOT diameter 1.89 cm    LVOT area 2.80 cm2    TR Max [place] 3.53 m/s    LV Systolic Volume 96.84 mL    LV Systolic Volume Index 42.8 mL/m2    LV Diastolic Volume 145.99 mL    LV Diastolic Volume Index 64.50 mL/m2    LA Volume Index 50.2 mL/m2    LV Mass Index 86.9 g/m2    RA Major Axis 5.54 cm    Left Atrium Minor Axis 5.77 cm    Left Atrium Major Axis 5.99 cm    Triscuspid Valve Regurgitation Peak Gradient 49.84 mmHg    RA Width 4.67 cm     Assessment and Plan:     Brief HPI:      * Cardiac arrest    Unclear precipitant, ?PEA d/t underlying severe comorbidities on background of severe CAD/CMP.  Minimal flat trop elev, doubt ACS.  Not on tele when this occurred, but has ICD in place  No immediate premorbid anginal sxs  Currently in SR  [place] PPM interrogated 12/11/18, no tachy events.  Note made of episodic pacing at 40BPM, but unclear exact timing.  This does not appear to be an arrhythmic syncopal event/cardiac arrest based on these findings.     CAD (coronary artery disease)    Known MV CAD unamenable to CABG or PCI (per notes from 2016)  Severe LV dysfxn noted  [place] ICD in place  Mild flat trop elev likely demand related in setting of severe CAD, unlikely ACS.     Essential hypertension    Cont med rx as BP will tolerate     HLD (hyperlipidemia)    Cont zetia     Diabetic foot infection    Per IM/ID     Chronic combined systolic and diastolic heart failure    As above     DM type 2 with diabetic peripheral neuropathy    Per IM         VTE Risk Mitigation (From admission, onward)        Ordered     enoxaparin injection 40 mg  Daily      12/12/18 0820     IP VTE HIGH RISK PATIENT  Once      12/10/18 2126        Okay to telemetry from CV standpoint.    Pernell Greer MD  Cardiology  [facility]

## 2018-12-15 NOTE — PLAN OF CARE
Problem: Occupational Therapy Goal  Goal: Occupational Therapy Goal  Goals to be met by: 12/29/18    Patient will increase functional independence with ADLs by performing:    UE Dressing with Set-up Assistance.  LE Dressing with Set-up Assistance.  Grooming while standing at sink with Set-up Assistance.  Toileting from bedside commode with Stand-by Assistance for hygiene and clothing management.   Toilet transfer to bedside commode with Supervision.  Upper extremity exercise program with supervision.    Outcome: Ongoing (interventions implemented as appropriate)  OT evaluation completed.  All goals are ongoing.

## 2018-12-15 NOTE — ASSESSMENT & PLAN NOTE
Discussed multiple medical problems and code with patient today. Asked him to discuss further with family. Needs LAPOST prior to discharge. Family says he is still legally  but not in contact with wife- also needs HCPOA documentation prior to discharge.

## 2018-12-15 NOTE — PT/OT/SLP EVAL
Physical Therapy Evaluation    Patient Name:  Marvin Ray   MRN:  [id number]    Recommendations:     Discharge Recommendations:  nursing facility, skilled   Discharge Equipment Recommendations: none   Barriers to discharge: Decreased caregiver support    Assessment:     Marvin Ray is a 58 y.o. male admitted with a medical diagnosis of Cardiac arrest.  He presents with the following impairments/functional limitations:  weakness, impaired functional mobilty, impaired balance, decreased upper extremity function, decreased safety awareness, impaired coordination, impaired skin, impaired cardiopulmonary response to activity, orthopedic precautions, decreased lower extremity function, decreased coordination, gait instability, impaired self care skills, impaired endurance .    Rehab Prognosis: Good; patient would benefit from acute skilled PT services to address these deficits and reach maximum level of function.    Recent Surgery: * No surgery found *      Plan:     During this hospitalization, patient to be seen daily to address the identified rehab impairments via gait training, therapeutic activities, therapeutic exercises, neuromuscular re-education, wheelchair management/training and progress toward the following goals:    GOALS:   Multidisciplinary Problems     Physical Therapy Goals        Problem: Physical Therapy Goal    Goal Priority Disciplines Outcome Goal Variances Interventions   Physical Therapy Goal     PT, PT/OT Ongoing (interventions implemented as appropriate)     Description:  Goals to be met by: 2018     Patient will increase functional independence with mobility by performin. Supine to sit with Modified [place]  2. Sit to stand transfer with Minimal Assistance  3. Gait to e assessed.   4. Lower extremity exercise program x10 reps per handout, with supervision  5.  Bed to chair t/f with Min A  6.  W/C propulsion x 150' with Supervision                      · Plan of Care  Expires:  12/29/18    Subjective     Chief Complaint: weakness, SOB  Patient/Family Comments/goals: increased functional independence  Pain/Comfort:  · Pain Rating 1: 0/10    Patients cultural, spiritual, Hinduism conflicts given the current situation: no    Living Environment:  Pt lives with his sister in a H with ramp to enter.    Prior to admission, patients level of function was Mod I with W/C.  Equipment used at home: bedside commode, bath bench, walker, rolling, walker, standard, crutches, wheelchair.  DME owned (not currently used): none.  Upon discharge, patient will have assistance from sister.    Objective:     Communicated with nsg prior to session.  Patient found call button in reach and family present oxygen, peripheral IV(ICU monitoring)  upon PT entry to room.    General Precautions: Standard, fall, respiratory   Orthopedic Precautions:LLE non weight bearing   Braces: (Darco shoe)     Exams:  · Cognitive Exam:  Patient is oriented to Person, Place, Time and Situation  · Gross Motor Coordination:  impaired 2/2 weakness and deconditioning  · Postural Exam:  Patient presented with the following abnormalities:    · -       Rounded shoulders  · -       Forward head  · Sensation:  N/T    · Skin Integrity/Edema:      · -       Skin integrity: dressing to L foot intact  · RLE ROM: WFL except dflx/pflx limited  · RLE Strength: WFL except dflx/pflx  · LLE ROM: WFL except dflx/pflx limited  · LLE Strength: WFL except dflx/pfflx     Functional Mobility:  · Bed Mobility:     · Scooting: minimum assistance  · Supine to Sit: moderate assistance  · Sit to Supine: moderate assistance  · Transfers:     · Sit to Stand:  maximal assistance and of 2 persons with rolling walker  · Gait: unable  · Balance: Fair+ sit, Fair-/Poor+ stand      Therapeutic Activities and Exercises:   Pt sat at EOB with SBA approx 20m total.  Pt with decreased SPO2 on RA, O2 replaced.  Instructed pt on breathing in through nose and out  through mouth.  Needs reinforcement.  Pt able to stand with Mod/Max A and VC for NWB L LE and distribution of weight through UE's to walker approx 10 sec x 3 trials    AM-PAC 6 CLICK MOBILITY  Total Score:11     Patient left HOB elevated with all lines intact, call button in reach, nsg notified and  family present.    GOALS:   Multidisciplinary Problems     Physical Therapy Goals        Problem: Physical Therapy Goal    Goal Priority Disciplines Outcome Goal Variances Interventions   Physical Therapy Goal     PT, PT/OT Ongoing (interventions implemented as appropriate)     Description:  Goals to be met by: 2018     Patient will increase functional independence with mobility by performin. Supine to sit with Modified [place]  2. Sit to stand transfer with Minimal Assistance  3. Gait to e assessed.   4. Lower extremity exercise program x10 reps per handout, with supervision  5.  Bed to chair t/f with Min A  6.  W/C propulsion x 150' with Supervision                      History:     Past Medical History:   Diagnosis Date    Arthritis     Cellulitis     CKD (chronic kidney disease), stage III     Coronary artery disease     Diabetes mellitus     Diabetic retinopathy     Diabetic ulcer of left foot     Glaucoma     Gout     Hyperlipemia     Hypertension     ICD (implantable cardioverter-defibrillator) in place 2018    Left chest    Non-pressure chronic ulcer of other part of left foot with fat layer exposed 10/23/2018    PVD (peripheral vascular disease)     Type 2 diabetes mellitus with left diabetic foot ulcer 10/29/2018    Unsteady gait     uses a wheelchair       Past Surgical History:   Procedure Laterality Date    AHMED GLAUCOMA IMPLANT Left     DONE AT [facility]    AMPUTATION, TOE Left 2018    [facility]formed [facility] at [facility] OR    AMPUTATION, TOES  2-5 Left 2018    [facility] at [facility] OR    BAERVELDT GLAUCOMA IMPLANT  Left 2012    WITH CATARACT EXTRACTION//DONE AT [facility]    CARDIAC CATHETERIZATION Left 05/2016    CARDIAC DEFIBRILLATOR PLACEMENT Left 11/02/2018    CATARACT EXTRACTION W/  INTRAOCULAR LENS IMPLANT Left 2012    WITH BAERVEDT//DONE AT [facility]    CATARACT EXTRACTION W/  INTRAOCULAR LENS IMPLANT Right 09/26/2018    COMPLEX ()    CB DESTRUCTION WITH CYCLO G6 Left 02/15/2017        CYST REMOVAL      Exam under anesthesia, left foot debridement, washout and all other indicated procedures Left 12/5/2018    [facility]    EXAMINATION UNDER ANESTHESIA Left 12/5/2018    Procedure: Exam under anesthesia, left foot debridement, washout and all other indicated procedures;  Surgeon: Mitch Chan MD;  Location: [facility] OR;  Service: Vascular;  Laterality: Left;  1030AM START  RN PREOP 12/3/2018-----AICD  SEEN BY DR GREER 12/4    HEART CATH-LEFT N/A 5/6/2016    [facility] CATH LAB    INCISION AND DRAINAGE Left 11/14/2018    Procedure: Incision and Drainage, left lower extremity debridement, washout;  Surgeon: Mitch Chan MD;  Location: [facility] OR;  Service: Vascular;  Laterality: Left;    INCISION AND DRAINAGE Left 11/16/2018    Procedure: INCISION AND DRAINAGE;  Surgeon: Mitch Chan MD;  Location: [facility] OR;  Service: Vascular;  Laterality: Left;    INCISION AND DRAINAGE Left 11/16/2018    Performed by [facility] at [facility] OR    Incision and Drainage, left lower extremity debridement, washout Left 11/14/2018    Per[facility] by Mitch [facility] at [facility] OR    INSERTION, ICD GENERATOR, SINGLE CHAMBER N/A 11/2/2018    Performed by Pernell Greer MD at [facility] CATH LAB    INSERTION, IOL PROSTHESIS Right 9/26/2018    Performed by Perla Cortés [facility] at [facility] OR 1ST FLR    INTRAOCULAR PROSTHESES INSERTION Right 9/26/2018    Procedure: INSERTION, IOL PROSTHESIS;  Surgeon: Perla Cortés MD;   Location: [facility] OR [address] [place] [address];  Service: Ophthalmology;  Laterality: Right;    PHACOEMULSIFICATION OF CATARACT Right 9/26/2018    Procedure: PHACOEMULSIFICATION, CATARACT;  Surgeon: Perla Cortés MD;  Location: [facility] OR [address] [place] [address];  Service: Ophthalmology;  Laterality: Right;    PHACOEMULSIFICATION, CATARACT Right 9/26/2018    Performed by Perla Cortés MD at [facility] OR [address] [place] [address]    Right foot surgery  10/2014    TOE AMPUTATION Right     first and second    TOE AMPUTATION Left 11/16/2018    Procedure: AMPUTATION, TOES  2-5;  Surgeon: Mitch Chan MD;  Location: [facility];  Service: Vascular;  Laterality: Left;    TOE AMPUTATION Left 12/5/2018    Procedure: AMPUTATION, TOE;  Surgeon: Mitch Chan MD;  Location: [facility];  Service: Vascular;  Laterality: Left;  left great toe    TONSILLECTOMY      TRABECULECTOMY/G 6 LASER Left 2/15/2017    Performed by Perla Cortés MD at [facility] OR [address] [place] [address]       Clinical Decision Making:     History  Co-morbidities and personal factors that may impact the plan of care Examination  Body Structures and Functions, activity limitations and participation restrictions that may impact the plan of care Clinical Presentation   Decision Making/ Complexity Score   Co-morbidities:   [] Time since onset of injury / illness / exacerbation  [] Status of current condition  []Patient's cognitive status and safety concerns    [] Multiple Medical Problems (see med hx)  Personal Factors:   [] Patient's age  [] Prior Level of function   [] Patient's home situation (environment and family support)  [] Patient's level of motivation  [] Expected progression of patient      HISTORY:(criteria)    [] 48180 - no personal factors/history    [] 74193 - has 1-2 personal factor/comorbidity     [] 28412 - has >3 personal factor/comorbidity     Body Regions:  [] Objective examination findings  [] Head     []  Neck  [] Trunk   [] Upper Extremity  [] Lower Extremity    Body Systems:  [] For  communication ability, affect, cognition, language, and learning style: the assessment of the ability to make needs known, consciousness, orientation (person, place, and time), expected emotional /behavioral responses, and learning preferences (eg, learning barriers, education  needs)  [] For the neuromuscular system: a general assessment of gross coordinated movement (eg, balance, gait, locomotion, transfers, and transitions) and motor function  (motor control and motor learning)  [] For the musculoskeletal system: the assessment of gross symmetry, gross range of motion, gross strength, height, and weight  [] For the integumentary system: the assessment of pliability(texture), presence of scar formation, skin color, and skin integrity  [] For cardiovascular/pulmonary system: the assessment of heart rate, respiratory rate, blood pressure, and edema     Activity limitations:    [] Patient's cognitive status and saf ety concerns          [] Status of current condition      [] Weight bearing restriction  [] Cardiopulmunary Restriction    Participation Restrictions:   [] Goals and goal agreement with the patient     [] Rehab potential (prognosis) and probable outcome      Examination of Body System: (criteria)    [] 41365 - addressing 1-2 elements    [] 76558 - addressing a total of 3 or more elements     [] 40210 -  Addressing a total of 4 or more elements         Clinical Presentation: (criteria)  Choose one     On examination of body system using standardized tests and measures patient presents with (CHOOSE ONE) elements from any of the following: body structures and functions, activity limitations, and/or participation restrictions.  Leading to a clinical presentation that is considered (CHOOSE ONE)                              Clinical Decision Making  (Eval Complexity):  Choose One     Time Tracking:     PT Received On: 12/15/18  PT Start Time: 1232     PT Stop Time: 1308  PT Total Time (min): 36 min     Billable  Minutes: Evaluation 15 and Therapeutic Activity 8 co-treat with HEBERT Foote, PT  12/15/2018

## 2018-12-15 NOTE — PROGRESS NOTES
[facility] [place] [facility]  Progress Note    Patient Name: Marvin Ray  MRN: [id number]  Patient Class: IP- Inpatient   Admission Date: 12/10/2018  Length of Stay: 4 days  Attending Physician: Jazz Sotomayor MD  Primary Care Provider: Rubén Davis MD        Subjective:     Principal Problem:Cardiac arrest    HPI:  58 y.o. male with DM2, HLD, HTN, CAD, chronic combined heart failure, CKD stage III presents with a complaint of left foot wound infection.  Prior cultures show only sensitive to meropenem.  Outpatient IV antibiotics were attempted to be setup last night and throughout the day today through his PCP but were unable to be procured.  He denies fever, chills, cough, SOB, chest pain, palpitations, headaches, vision changes, N/V/D, abdominal pain, or dysuria.  Placed in observation for PICC line placement and to setup home IV abx.    Hospital Course:  Patient admitted for PICC line placement for IV antibiotics for left wound infection -12/5/18 wound culture Acinetobacter baumannii/haemolyticus sensitive to meropenem. IV meropenum started in ED. On 12/11/18, patient cardiac arrest with ROSC after ACLS. Per sister, he was standing getting ready to leave the hospital and then fell back into the bed. Patient intubated on 12/11 and transferred to ICU. Narcan given prior to transfer to ICU.   Sedated with propofol. Hypotensive after arrest requiring levophed that has been weaned. Cardiology and Pulmonary consulted. ICD interrogated- no events, no shocks. Possibly PEA arrest due to severe infection with underlying biventricular failure and severe CAD? TTE with EF 30%, diastolic dysfunction, new RV changes. CT negative for PE. Troponins elevated at 0.5 but downtrended. Initially started asa, plavix, and full dose lovenox with concern for NSTEMI, but unlikely NSTEMI given minimal troponin elevations that could be explained by chest compressions alone; lovenox discontinued. Will continue DAPT  given severe CAD with no intervention (not a candidate for PCI or CABG given anatomy). Blood cultures checked and NGTD. Wound care following for DM foot wounds. Abdominal US with ascites s/p paracentesis with 2.4L of clear light green ascites removed on 12/12. SAAG 0.6 which is NOT consistent with portal hypertension. TP 4.2 which would be consistent with cardiac etiology. No SBP. No pancreatitis on CT abd/pelvis and none clinically either. UA and UPC does not show protein; ascites not from nephrotic syndrome. Cytology and AFB culture and smear on ascitic fluid pending. Patient extubated to BiPAP on 12/13, weaned to nasal cannula. Levophed off since 12/14 at 5pm. Continues to improve. Stable for step down to floor on 12/15.      Interval History: Awake and alert. No complaints.     Review of Systems   Respiratory: Negative for cough, chest tightness, shortness of breath and wheezing.    Cardiovascular: Negative for chest pain, palpitations and leg swelling.   Gastrointestinal: Negative for abdominal distention, abdominal pain, constipation, diarrhea, nausea and vomiting.   Skin: Positive for wound.     Objective:     Vital Signs (Most Recent):  Temp: 98.8 °F (37.1 °C) (12/15/18 0315)  Pulse: 89 (12/15/18 1147)  Resp: 15 (12/15/18 0600)  BP: (!) 115/59 (12/15/18 1147)  SpO2: 97 % (12/15/18 0600) Vital Signs (24h Range):  Temp:  [98.3 °F (36.8 °C)-98.8 °F (37.1 °C)] 98.8 °F (37.1 °C)  Pulse:  [] 89  Resp:  [11-22] 15  SpO2:  [84 %-99 %] 97 %  BP: ()/(52-69) 115/59  Arterial Line BP: ()/(44-59) 104/53     Weight: 99.8 kg (220 lb 0.3 oz)  Body mass index is 31.57 kg/m².    Intake/Output Summary (Last 24 hours) at 12/15/2018 1231  Last data filed at 12/15/2018 0730  Gross per 24 hour   Intake 738.6 ml   Output 900 ml   Net -161.4 ml      Physical Exam   Constitutional: He is oriented to person, place, and time. He appears well-developed and well-nourished. No distress.   HENT:   Head: Normocephalic and  atraumatic.   Mouth/Throat: Oropharynx is clear and moist.   ETT and OGT in place   Cardiovascular: Normal rate, regular rhythm, normal heart sounds and intact distal pulses. Exam reveals no gallop and no friction rub.   No murmur heard.  Ectopy on telemetry   Pulmonary/Chest: Effort normal and breath sounds normal. No stridor. No respiratory distress. He has no wheezes. He has no rales.   Mechanical ventilation   Abdominal: Bowel sounds are normal. He exhibits distension (improved). He exhibits no mass. There is no tenderness. There is no guarding.   Bandage in RLQ dry and intact   Musculoskeletal: He exhibits edema.   Neurological: He is alert and oriented to person, place, and time.   Skin: He is not diaphoretic.   Bilateral feet dressed   Nursing note and vitals reviewed.      Significant Labs: All pertinent labs within the past 24 hours have been reviewed.    Significant Imaging: I have reviewed and interpreted all pertinent imaging results/findings within the past 24 hours.    Assessment/Plan:      * Cardiac arrest    On 12/11  PEA? ICD interrogated with no shocks or abnormal rhythms.  Etiology unclear. No PE, no NSTEMI, no electrolyte abnormalities, no hypoxia, no toxins  May be PEA arrest due to severe infection in setting of biventricular cardiomyopathy and severe CAD       Goals of care, counseling/discussion    Discussed multiple medical problems and code with patient today. Asked him to discuss further with family. Needs LAPOST prior to discharge. Family says he is still legally  but not in contact with wife- also needs HCPOA documentation prior to discharge.        Acute renal insufficiency    Due to cardiac arrest. Cr up to 1.4 from baseline ~0.8. Now resolved.        Severe malnutrition    Encourage diet when able       Other ascites    Abdominal US with ascites  Previous paracentesis in 10/2018 with SAAG 0.9, not consistent with portal hypertension  Repeat paracentesis 12/12 with light green  clear fluid. SAAG 0.6. Total protein 4.2.  with 196 PMNs, not consistent with SBP.   Ascites cytology and AFB culture and smear pending  UA negative for protein, UPC not consistent with nephrotic syndrome  Not clinically or radiologically consistent with pancreatitis  GI consulted in 10/2018 with prior paracentesis and suggested it could be due to CHF. Total protein is consistent with this but SAAG is not       Elevated troponin    Troponin elevated to 0.5 max with new TWIs  Initially started NSTEMI protocol on 12/11 with concern for possible NSTEMI as cause of cardiac arrest  Troponins downtrended  Discontinued weight based lovenox on 12/12 after discussion with Cardiology       Shock    Hypotensive after code on 12/11 and required levophed. Levophed off since 12/14 at 5pm  Exact cause of shock unclear       Acute hypoxemic respiratory failure    Intubated 12/11 post code  Ascites and bilateral effusions cause decreased lung compliance-- paracentesis 12/12 with 2.4L off  Extubated to BiPAP on 12/13  Now weaned to NC, likely can be weaned further to room air       Diabetic foot infection    Tissue culture obtained by Vascular Surgery obtained 12/5/18 grew Acinetobacter baumannii/haemolyticus sensitive to meropenem.    ID consulted- recommend meropenem and vanc x6 weeks   PICC in place R arm for outpatient antibiotics  Wound care following  ESR 72, CRP 89 on 12/11- trend weekly  Poor wound healing likely given PAD and DM with extensive wound. Discussed with patient on 12/14  Will need ID, Vascular, Wound care follow up after discharge     Chronic combined systolic and diastolic heart failure    No evidence of decompensation on admit  Post code BNP elevated, though this is expected  Patient has ascites and bilateral pleural effusions. Does not have significant pulmonary edema  Will change lasix to 40mg IV daily- patient has good UOP and improving Cr with this regimen  Likely will need to add spironolactone for  ascites and CHF when tolerated by BP  Monitor UOP    TTE 12/11  · Severely decreased left ventricular systolic function. The estimated ejection fraction is 20%  · Severe global hypokinetic wall motion. Cannot exclude RWMA.  · Septal wall has abnormal motion. Systolic and diastolic flattening of the interventricular septum consistent with right ventricle pressure and volume overload.  · Left ventricular diastolic dysfunction.  · Mild right ventricular enlargement.  · Mildly to moderately reduced right ventricular systolic function.  · Moderate tricuspid regurgitation.  · There is a large left pleural effusion.     LAYNE (acute kidney injury)    Baseline Cr ~0.9  Cr increased to 1.4 post code, now improved back to baseline  Continue to monitor  Renal dose all meds, avoid nephrotoxins      PVD (peripheral vascular disease)    Continue asa, plavix, lipid reduction meds       CAD (coronary artery disease)    Patient has severe 3v disease per [facility] in 2016 with no targets for PCI or CABG  Continue asa. Add plavix (discussed with Juan Antonio). Continue lipid management. Hold BB and ACEi for hypotension  With elevated troponin- see elsewhere     Essential hypertension    Off levophed since 12/14 at 5pm  Continue to monitor BP  Hold all BP meds for now     Tophaceous gout    Continue home allopurinol       Neovascular glaucoma of both eyes    Continue eye drops       HLD (hyperlipidemia)    Last lipids 10/2018 are well controlled  Continue home regimen of ezetimibe and fish oil (statin intolerance)     DM type 2 with diabetic peripheral neuropathy    Last HgbA1c   Lab Results   Component Value Date    HGBA1C 8.3 (H) 10/30/2018   Hold oral antihyperglycemics while inpatient    Meds: detemir 5 + SSI  ACHS accuchecks, ADA diet, hypoglycemic protocol          VTE Risk Mitigation (From admission, onward)        Ordered     enoxaparin injection 40 mg  Daily      12/12/18 0820     IP VTE HIGH RISK PATIENT  Once      12/10/18 6178           Critical care time spent on the evaluation and treatment of severe organ dysfunction, review of pertinent labs and imaging studies, discussions with consulting providers and discussions with patient/family: 50 minutes.    Jazz Sotomayor MD  Department of Hospital Medicine   [facility]

## 2018-12-15 NOTE — SUBJECTIVE & OBJECTIVE
Interval History:  No complaints.  Tolerating p.o. and stable off pressor    Telemetry:  Normal sinus rhythm with ectopy    Review of Systems   All other systems reviewed and are negative.    Objective:     Vital Signs (Most Recent):  Temp: 98.8 °F (37.1 °C) (12/15/18 0315)  Pulse: 82 (12/15/18 0600)  Resp: 15 (12/15/18 0600)  BP: (!) 114/59 (12/15/18 0600)  SpO2: 97 % (12/15/18 0600) Vital Signs (24h Range):  Temp:  [98.3 °F (36.8 °C)-98.8 °F (37.1 °C)] 98.8 °F (37.1 °C)  Pulse:  [] 82  Resp:  [11-22] 15  SpO2:  [84 %-99 %] 97 %  BP: ()/(52-69) 114/59  Arterial Line BP: ()/(44-59) 104/53     Weight: 99.8 kg (220 lb 0.3 oz)  Body mass index is 31.57 kg/m².     SpO2: 97 %  O2 Device (Oxygen Therapy): nasal cannula w/ humidification      Intake/Output Summary (Last 24 hours) at 12/15/2018 1120  Last data filed at 12/15/2018 0600  Gross per 24 hour   Intake 746.76 ml   Output 825 ml   Net -78.24 ml       Lines/Drains/Airways     Peripherally Inserted Central Catheter Line                 PICC Double Lumen 12/10/18 2241 right basilic 4 days          Drain                 Open Drain 11/14/18 1353 Left Foot Other (see comments) Other (see comments) 30 days         Urethral Catheter 12/11/18 1351 Double-lumen 16 Fr. 3 days          Airway                 Airway - Non-Surgical 11/16/18 0815 Other (Comment) 29 days          Arterial Line                 Arterial Line 12/11/18 1530 Left Radial 3 days          Pressure Ulcer                 Pressure Injury 12/14/18 0730 Buttocks Stage 2 1 day                Physical Exam   Constitutional: He is oriented to person, place, and time. No distress.   Cardiovascular: Normal rate and regular rhythm.   Pulmonary/Chest:   Decreased breath sounds anteriorly   Musculoskeletal: He exhibits edema and deformity.   Neurological: He is alert and oriented to person, place, and time.       Significant Labs:   BMP:   Recent Labs   Lab 12/14/18  0240 12/15/18  0215   *  196*    141   K 3.6 3.7    104   CO2 29 29   BUN 20 23*   CREATININE 0.7 0.7   CALCIUM 8.4* 8.1*    and CBC   Recent Labs   Lab 12/14/18  0240 12/15/18  0215   WBC 6.99 6.40   HGB 8.9* 8.6*   HCT 27.7* 26.8*    252       Significant Imaging: Echocardiogram:   Transthoracic echo (TTE) complete (Cupid Only):   Results for orders placed or performed during the hospital encounter of 12/10/18   Transthoracic echo (TTE) complete (Cupid Only)   Result Value Ref Range    BSA 2.33 m2    LA WIDTH 4.70 cm    AORTIC VALVE CUSP SEPERATION 1.95 cm    PV PEAK VELOCITY 0.97 cm/s    LVIDD 5.48 3.5 - 6.0 cm    IVS 0.94 0.6 - 1.1 cm    PW 0.95 0.6 - 1.1 cm    Ao root annulus 3.08 cm    LVIDS 4.59 (A) 2.1 - 4.0 cm    FS 16 28 - 44 %    LA volume 113.65 cm3    Sinus 3.19 cm    STJ 2.25 cm    Ascending aorta 2.60 cm    LV mass 196.74 g    LA size 4.84 cm    RVDD 5.45 cm    TAPSE 1.48 cm    RV S' 11.20 m/s    Left Ventricle Relative Wall Thickness 0.35 cm    LVOT diameter 1.89 cm    LVOT area 2.80 cm2    TR Max Korey 3.53 m/s    LV Systolic Volume 96.84 mL    LV Systolic Volume Index 42.8 mL/m2    LV Diastolic Volume 145.99 mL    LV Diastolic Volume Index 64.50 mL/m2    LA Volume Index 50.2 mL/m2    LV Mass Index 86.9 g/m2    RA Major Axis 5.54 cm    Left Atrium Minor Axis 5.77 cm    Left Atrium Major Axis 5.99 cm    Triscuspid Valve Regurgitation Peak Gradient 49.84 mmHg    RA Width 4.67 cm

## 2018-12-16 LAB
ANION GAP SERPL CALC-SCNC: 5 MMOL/L
BACTERIA BLD CULT: NORMAL
BACTERIA BLD CULT: NORMAL
BACTERIA SPEC AEROBE CULT: NO GROWTH
BACTERIA SPEC ANAEROBE CULT: NORMAL
BASOPHILS # BLD AUTO: 0.03 K/UL
BASOPHILS NFR BLD: 0.4 %
BUN SERPL-MCNC: 28 MG/DL
CALCIUM SERPL-MCNC: 8.3 MG/DL
CHLORIDE SERPL-SCNC: 103 MMOL/L
CO2 SERPL-SCNC: 30 MMOL/L
CREAT SERPL-MCNC: 0.7 MG/DL
DIFFERENTIAL METHOD: ABNORMAL
EOSINOPHIL # BLD AUTO: 0.3 K/UL
EOSINOPHIL NFR BLD: 4.2 %
ERYTHROCYTE [DISTWIDTH] IN BLOOD BY AUTOMATED COUNT: 15.9 %
EST. GFR  (AFRICAN AMERICAN): >60 ML/MIN/1.73 M^2
EST. GFR  (NON AFRICAN AMERICAN): >60 ML/MIN/1.73 M^2
FUNGUS SPEC CULT: NORMAL
GLUCOSE SERPL-MCNC: 154 MG/DL
HCT VFR BLD AUTO: 26.4 %
HGB BLD-MCNC: 8.4 G/DL
LYMPHOCYTES # BLD AUTO: 0.7 K/UL
LYMPHOCYTES NFR BLD: 10.3 %
MCH RBC QN AUTO: 30 PG
MCHC RBC AUTO-ENTMCNC: 31.8 G/DL
MCV RBC AUTO: 94 FL
MONOCYTES # BLD AUTO: 0.7 K/UL
MONOCYTES NFR BLD: 10.2 %
NEUTROPHILS # BLD AUTO: 5 K/UL
NEUTROPHILS NFR BLD: 74.9 %
PLATELET # BLD AUTO: 282 K/UL
PMV BLD AUTO: 8.8 FL
POCT GLUCOSE: 141 MG/DL (ref 70–110)
POCT GLUCOSE: 171 MG/DL (ref 70–110)
POCT GLUCOSE: 220 MG/DL (ref 70–110)
POCT GLUCOSE: 231 MG/DL (ref 70–110)
POTASSIUM SERPL-SCNC: 3.8 MMOL/L
RBC # BLD AUTO: 2.8 M/UL
SODIUM SERPL-SCNC: 138 MMOL/L
VANCOMYCIN SERPL-MCNC: 18 UG/ML
VANCOMYCIN TROUGH SERPL-MCNC: 24.7 UG/ML
WBC # BLD AUTO: 6.69 K/UL

## 2018-12-16 PROCEDURE — 25000003 PHARM REV CODE 250: Performed by: NURSE PRACTITIONER

## 2018-12-16 PROCEDURE — 97110 THERAPEUTIC EXERCISES: CPT

## 2018-12-16 PROCEDURE — 80202 ASSAY OF VANCOMYCIN: CPT | Mod: 91

## 2018-12-16 PROCEDURE — 63600175 PHARM REV CODE 636 W HCPCS: Performed by: INTERNAL MEDICINE

## 2018-12-16 PROCEDURE — 21400001 HC TELEMETRY ROOM

## 2018-12-16 PROCEDURE — 97530 THERAPEUTIC ACTIVITIES: CPT

## 2018-12-16 PROCEDURE — 63600175 PHARM REV CODE 636 W HCPCS: Performed by: NURSE PRACTITIONER

## 2018-12-16 PROCEDURE — 80048 BASIC METABOLIC PNL TOTAL CA: CPT

## 2018-12-16 PROCEDURE — 80202 ASSAY OF VANCOMYCIN: CPT

## 2018-12-16 PROCEDURE — 25000003 PHARM REV CODE 250: Performed by: INTERNAL MEDICINE

## 2018-12-16 PROCEDURE — 85025 COMPLETE CBC W/AUTO DIFF WBC: CPT

## 2018-12-16 PROCEDURE — 25000003 PHARM REV CODE 250: Performed by: HOSPITALIST

## 2018-12-16 PROCEDURE — 36415 COLL VENOUS BLD VENIPUNCTURE: CPT

## 2018-12-16 PROCEDURE — 63600175 PHARM REV CODE 636 W HCPCS: Performed by: HOSPITALIST

## 2018-12-16 PROCEDURE — A4216 STERILE WATER/SALINE, 10 ML: HCPCS | Performed by: INTERNAL MEDICINE

## 2018-12-16 PROCEDURE — 25000003 PHARM REV CODE 250: Performed by: EMERGENCY MEDICINE

## 2018-12-16 RX ORDER — VANCOMYCIN HCL IN 5 % DEXTROSE 1G/250ML
1000 PLASTIC BAG, INJECTION (ML) INTRAVENOUS
Status: DISCONTINUED | OUTPATIENT
Start: 2018-12-16 | End: 2018-12-18

## 2018-12-16 RX ADMIN — COLLAGENASE SANTYL: 250 OINTMENT TOPICAL at 04:12

## 2018-12-16 RX ADMIN — Medication 10 ML: at 12:12

## 2018-12-16 RX ADMIN — EZETIMIBE 10 MG: 10 TABLET ORAL at 09:12

## 2018-12-16 RX ADMIN — DORZOLAMIDE HYDROCHLORIDE AND TIMOLOL MALEATE 1 DROP: 20; 5 SOLUTION OPHTHALMIC at 10:12

## 2018-12-16 RX ADMIN — MEROPENEM 2 G: 1 INJECTION, POWDER, FOR SOLUTION INTRAVENOUS at 09:12

## 2018-12-16 RX ADMIN — DORZOLAMIDE HYDROCHLORIDE AND TIMOLOL MALEATE 1 DROP: 20; 5 SOLUTION OPHTHALMIC at 09:12

## 2018-12-16 RX ADMIN — INSULIN ASPART 3 UNITS: 100 INJECTION, SOLUTION INTRAVENOUS; SUBCUTANEOUS at 04:12

## 2018-12-16 RX ADMIN — OXYCODONE AND ACETAMINOPHEN 1 TABLET: 5; 325 TABLET ORAL at 09:12

## 2018-12-16 RX ADMIN — MEROPENEM 2 G: 1 INJECTION, POWDER, FOR SOLUTION INTRAVENOUS at 07:12

## 2018-12-16 RX ADMIN — CLOPIDOGREL BISULFATE 75 MG: 75 TABLET ORAL at 09:12

## 2018-12-16 RX ADMIN — OMEGA-3 FATTY ACIDS CAP 1000 MG 1 CAPSULE: 1000 CAP at 09:12

## 2018-12-16 RX ADMIN — CARVEDILOL 3.12 MG: 3.12 TABLET, FILM COATED ORAL at 11:12

## 2018-12-16 RX ADMIN — OXYCODONE AND ACETAMINOPHEN 1 TABLET: 5; 325 TABLET ORAL at 04:12

## 2018-12-16 RX ADMIN — CARVEDILOL 3.12 MG: 3.12 TABLET, FILM COATED ORAL at 09:12

## 2018-12-16 RX ADMIN — ALLOPURINOL 200 MG: 100 TABLET ORAL at 09:12

## 2018-12-16 RX ADMIN — INSULIN ASPART 3 UNITS: 100 INJECTION, SOLUTION INTRAVENOUS; SUBCUTANEOUS at 12:12

## 2018-12-16 RX ADMIN — INSULIN ASPART 2 UNITS: 100 INJECTION, SOLUTION INTRAVENOUS; SUBCUTANEOUS at 01:12

## 2018-12-16 RX ADMIN — MEROPENEM 2 G: 1 INJECTION, POWDER, FOR SOLUTION INTRAVENOUS at 02:12

## 2018-12-16 RX ADMIN — STANDARDIZED SENNA CONCENTRATE AND DOCUSATE SODIUM 1 TABLET: 8.6; 5 TABLET, FILM COATED ORAL at 09:12

## 2018-12-16 RX ADMIN — STANDARDIZED SENNA CONCENTRATE AND DOCUSATE SODIUM 1 TABLET: 8.6; 5 TABLET, FILM COATED ORAL at 10:12

## 2018-12-16 RX ADMIN — VANCOMYCIN HYDROCHLORIDE 1000 MG: 1 INJECTION, POWDER, LYOPHILIZED, FOR SOLUTION INTRAVENOUS at 12:12

## 2018-12-16 RX ADMIN — Medication: at 09:12

## 2018-12-16 RX ADMIN — BRIMONIDINE TARTRATE 1 DROP: 1 SOLUTION/ DROPS OPHTHALMIC at 10:12

## 2018-12-16 RX ADMIN — INSULIN ASPART 2 UNITS: 100 INJECTION, SOLUTION INTRAVENOUS; SUBCUTANEOUS at 10:12

## 2018-12-16 RX ADMIN — OMEGA-3 FATTY ACIDS CAP 1000 MG 1 CAPSULE: 1000 CAP at 10:12

## 2018-12-16 RX ADMIN — Medication 10 ML: at 06:12

## 2018-12-16 RX ADMIN — BRIMONIDINE TARTRATE 1 DROP: 1 SOLUTION/ DROPS OPHTHALMIC at 09:12

## 2018-12-16 RX ADMIN — ENOXAPARIN SODIUM 40 MG: 100 INJECTION SUBCUTANEOUS at 04:12

## 2018-12-16 RX ADMIN — INSULIN ASPART 4 UNITS: 100 INJECTION, SOLUTION INTRAVENOUS; SUBCUTANEOUS at 04:12

## 2018-12-16 RX ADMIN — ASPIRIN 81 MG 81 MG: 81 TABLET ORAL at 09:12

## 2018-12-16 RX ADMIN — PREDNISOLONE ACETATE 1 DROP: 10 SUSPENSION/ DROPS OPHTHALMIC at 09:12

## 2018-12-16 RX ADMIN — Medication: at 10:12

## 2018-12-16 RX ADMIN — INSULIN ASPART 3 UNITS: 100 INJECTION, SOLUTION INTRAVENOUS; SUBCUTANEOUS at 09:12

## 2018-12-16 RX ADMIN — DORZOLAMIDE HYDROCHLORIDE AND TIMOLOL MALEATE 1 DROP: 20; 5 SOLUTION OPHTHALMIC at 03:12

## 2018-12-16 RX ADMIN — FUROSEMIDE 40 MG: 10 INJECTION, SOLUTION INTRAMUSCULAR; INTRAVENOUS at 09:12

## 2018-12-16 RX ADMIN — Medication 10 ML: at 07:12

## 2018-12-16 NOTE — ASSESSMENT & PLAN NOTE
Baseline Cr ~0.9  Cr increased to 1.4 post code, now improved back to baseline  Continue to monitor  Renal dose all meds, avoid nephrotoxins   Resolved

## 2018-12-16 NOTE — ASSESSMENT & PLAN NOTE
On 12/11  PEA? ICD interrogated with no shocks or abnormal rhythms.  Etiology unclear. No PE, no NSTEMI, no electrolyte abnormalities, no hypoxia, no toxins  May be PEA arrest due to severe infection in setting of biventricular cardiomyopathy and severe CAD  Now stable on Telemetry.  PT/OT consulted and recommending SNF.  Patient wants to go home.  Will discuss with MAGED.

## 2018-12-16 NOTE — PROGRESS NOTES
Arrived to room with son at bedside. No c/o pain and no distress noted. Meropenem  Infusing to PICC in right upper arm. AAO X 4. Fluidized boots in place. Lungs clear, pos BS X 4 hyperactive, NC in place and telemetry monitor attached.

## 2018-12-16 NOTE — PLAN OF CARE
Patient free from fall and injurys. O2 2LNS. Sat 96%.  PICC to rt upper forearm, both lumen flushed well w/o resistance with good blood return. Sis and jacob discontinued. Tolerated well. VSS. OT/PT assess patient today. Comfort provided and maintained.

## 2018-12-16 NOTE — PROGRESS NOTES
[facility] Medicine  Progress Note    Patient Name: Marvin Ray  MRN: [id number]  Patient Class: IP- Inpatient   Admission Date: 12/10/2018  Length of Stay: 5 days  Attending Physician: Brando Tomlinson MD  Primary Care Provider: Rubén Davis MD        Subjective:     Principal Problem:Cardiac arrest    HPI:  58 y.o. male with DM2, HLD, HTN, CAD, chronic combined heart failure, CKD stage III presents with a complaint of left foot wound infection.  Prior cultures show only sensitive to meropenem.  Outpatient IV antibiotics were attempted to be setup last night and throughout the day today through his PCP but were unable to be procured.  He denies fever, chills, cough, SOB, chest pain, palpitations, headaches, vision changes, N/V/D, abdominal pain, or dysuria.  Placed in observation for PICC line placement and to setup home IV abx.    Hospital Course:  Patient admitted for PICC line placement for IV antibiotics for left wound infection -12/5/18 wound culture Acinetobacter baumannii/haemolyticus sensitive to meropenem. IV meropenum started in ED. On 12/11/18, patient cardiac arrest with ROSC after ACLS. Per sister, he was standing getting ready to leave the hospital and then fell back into the bed. Patient intubated on 12/11 and transferred to ICU. Narcan given prior to transfer to ICU.   Sedated with propofol. Hypotensive after arrest requiring levophed that has been weaned. Cardiology and Pulmonary consulted. ICD interrogated- no events, no shocks. Possibly PEA arrest due to severe infection with underlying biventricular failure and severe CAD? TTE with EF 30%, diastolic dysfunction, new RV changes. CT negative for PE. Troponins elevated at 0.5 but downtrended. Initially started asa, plavix, and full dose lovenox with concern for NSTEMI, but unlikely NSTEMI given minimal troponin elevations that could be explained by chest compressions alone; lovenox discontinued. Will continue  DAPT given severe CAD with no intervention (not a candidate for PCI or CABG given anatomy). Blood cultures checked and NGTD. Wound care following for DM foot wounds. Abdominal US with ascites s/p paracentesis with 2.4L of clear light green ascites removed on 12/12. SAAG 0.6 which is NOT consistent with portal hypertension. TP 4.2 which would be consistent with cardiac etiology. No SBP. No pancreatitis on CT abd/pelvis and none clinically either. UA and UPC does not show protein; ascites not from nephrotic syndrome. Cytology and AFB culture and smear on ascitic fluid pending. Patient extubated to BiPAP on 12/13, weaned to nasal cannula. Levophed off since 12/14 at 5pm. Continues to improve. Stable for step down to floor on 12/15.  PT/OT recommending SNF.    Interval History: Feeling well.    Review of Systems   HENT: Negative for ear discharge and ear pain.    Eyes: Negative for pain and itching.   Endocrine: Negative for polyphagia and polyuria.   Neurological: Negative for seizures and syncope.     Objective:     Vital Signs (Most Recent):  Temp: 97.4 °F (36.3 °C) (12/16/18 1134)  Pulse: 89 (12/16/18 1134)  Resp: 16 (12/16/18 1134)  BP: (!) 104/54 (12/16/18 1134)  SpO2: 97 % (12/16/18 1134) Vital Signs (24h Range):  Temp:  [97.4 °F (36.3 °C)-99.3 °F (37.4 °C)] 97.4 °F (36.3 °C)  Pulse:  [80-89] 89  Resp:  [13-23] 16  SpO2:  [96 %-99 %] 97 %  BP: (104-124)/(54-61) 104/54     Weight: 99.8 kg (220 lb 0.3 oz)  Body mass index is 31.57 kg/m².    Intake/Output Summary (Last 24 hours) at 12/16/2018 1333  Last data filed at 12/16/2018 0251  Gross per 24 hour   Intake 200 ml   Output 250 ml   Net -50 ml      Physical Exam   Constitutional: He is oriented to person, place, and time. He appears well-developed and well-nourished. No distress.   HENT:   Head: Normocephalic and atraumatic.   Mouth/Throat: Oropharynx is clear and moist.   Cardiovascular: Normal rate, regular rhythm, normal heart sounds and intact distal pulses.  Exam reveals no gallop and no friction rub.   No murmur heard.  Pulmonary/Chest: Effort normal and breath sounds normal. No stridor. No respiratory distress. He has no wheezes. He has no rales.   Abdominal: Soft. Bowel sounds are normal. He exhibits no mass. There is no tenderness. There is no guarding.   Bandage in RLQ dry and intact   Musculoskeletal: He exhibits edema.   Neurological: He is alert and oriented to person, place, and time.   Skin: He is not diaphoretic.   Bilateral feet dressed   Nursing note and vitals reviewed.      Significant Labs:   BMP:   Recent Labs   Lab 12/16/18  0320   *      K 3.8      CO2 30*   BUN 28*   CREATININE 0.7   CALCIUM 8.3*     CBC:   Recent Labs   Lab 12/15/18  0215 12/16/18  0320   WBC 6.40 6.69   HGB 8.6* 8.4*   HCT 26.8* 26.4*    282         Assessment/Plan:      * Cardiac arrest    On 12/11  PEA? ICD interrogated with no shocks or abnormal rhythms.  Etiology unclear. No PE, no NSTEMI, no electrolyte abnormalities, no hypoxia, no toxins  May be PEA arrest due to severe infection in setting of biventricular cardiomyopathy and severe CAD  Now stable on Telemetry.  PT/OT consulted and recommending SNF.  Patient wants to go home.  Will discuss with MAGED.       Diabetic foot infection    Tissue culture obtained by Vascular Surgery obtained 12/5/18 grew Acinetobacter baumannii/haemolyticus sensitive to meropenem.    ID consulted- recommend meropenem and vanc x6 weeks   PICC in place R arm for outpatient antibiotics  Wound care following  ESR 72, CRP 89 on 12/11- trend weekly  Poor wound healing likely given PAD and DM with extensive wound. Discussed with patient on 12/14  Will need ID, Vascular, Wound care follow up after discharge     Goals of care, counseling/discussion    Discussed multiple medical problems and code with patient today. Asked him to discuss further with family. Needs LAPOST prior to discharge. Family says he is still legally  but  not in contact with wife- also needs HCPOA documentation prior to discharge.        Acute renal insufficiency    Due to cardiac arrest. Cr up to 1.4 from baseline ~0.8. Now resolved.        Severe malnutrition    Encourage oral intake       Other ascites    Abdominal US with ascites  Previous paracentesis in 10/2018 with SAAG 0.9, not consistent with portal hypertension  Repeat paracentesis 12/12 with light green clear fluid. SAAG 0.6. Total protein 4.2.  with 196 PMNs, not consistent with SBP.   Ascites cytology and AFB culture and smear pending  UA negative for protein, UPC not consistent with nephrotic syndrome  Not clinically or radiologically consistent with pancreatitis  GI consulted in 10/2018 with prior paracentesis and suggested it could be due to CHF. Total protein is consistent with this but SAAG is not       Elevated troponin    Troponin elevated to 0.5 max with new TWIs  Initially started NSTEMI protocol on 12/11 with concern for possible NSTEMI as cause of cardiac arrest  Troponins downtrended  Discontinued weight based lovenox on 12/12 after discussion with Cardiology       Shock    Hypotensive after code on 12/11 and required levophed. Levophed off since 12/14 at 5pm  Exact cause of shock unclear       Acute hypoxemic respiratory failure    Intubated 12/11 post code  Ascites and bilateral effusions cause decreased lung compliance-- paracentesis 12/12 with 2.4L off  Extubated to BiPAP on 12/13  Now weaned to NC, likely can be weaned further to room air       Chronic combined systolic and diastolic heart failure    No evidence of decompensation on admit  Post code BNP elevated, though this is expected  Patient has ascites and bilateral pleural effusions. Does not have significant pulmonary edema  Will change lasix to 40mg IV daily- patient has good UOP and improving Cr with this regimen  Likely will need to add spironolactone for ascites and CHF when tolerated by BP  Monitor UOP    TTE  12/11  · Severely decreased left ventricular systolic function. The estimated ejection fraction is 20%  · Severe global hypokinetic wall motion. Cannot exclude RWMA.  · Septal wall has abnormal motion. Systolic and diastolic flattening of the interventricular septum consistent with right ventricle pressure and volume overload.  · Left ventricular diastolic dysfunction.  · Mild right ventricular enlargement.  · Mildly to moderately reduced right ventricular systolic function.  · Moderate tricuspid regurgitation.  · There is a large left pleural effusion.     LAYNE (acute kidney injury)    Baseline Cr ~0.9  Cr increased to 1.4 post code, now improved back to baseline  Continue to monitor  Renal dose all meds, avoid nephrotoxins   Resolved      PVD (peripheral vascular disease)    Continue asa, plavix, lipid reduction meds       CAD (coronary artery disease)    Patient has severe 3v disease per [facility] in 2016 with no targets for PCI or CABG  Continue asa. Added plavix (discussed with Juan Antonio). Continue lipid management. Hold BB and ACEi for hypotension       Essential hypertension    Hold all BP meds for now  Currently normotensive off BP meds.     Tophaceous gout    Continue home allopurinol       Neovascular glaucoma of both eyes    Continue eye drops       HLD (hyperlipidemia)    Last lipids 10/2018 are well controlled  Continue home regimen of ezetimibe and fish oil (statin intolerance)     DM type 2 with diabetic peripheral neuropathy    Last HgbA1c   Lab Results   Component Value Date    HGBA1C 8.3 (H) 10/30/2018   Hold oral antihyperglycemics while inpatient    Meds: detemir 5 + SSI  ACHS accuchecks, ADA diet, hypoglycemic protocol          VTE Risk Mitigation (From admission, onward)        Ordered     enoxaparin injection 40 mg  Daily      12/12/18 0820     IP VTE HIGH RISK PATIENT  Once      12/10/18 0761              Brando Tomlinson MD  Department of Hospital Medicine   [facility]

## 2018-12-16 NOTE — PLAN OF CARE
Problem: Skin Injury Risk Increased  Goal: Skin Health and Integrity    Intervention: Optimize Skin Protection   12/16/18 1609   Prevent Additional Skin Injury   Head of Bed (HOB) HOB elevated   Pressure Reduction Techniques frequent weight shift encouraged     Intervention: Promote and Optimize Oral Intake   12/16/18 1609   Monitor and Manage Anemia   Oral Nutrition Promotion rest periods promoted;social interaction promoted;medicated         Problem: Wound  Goal: Optimal Wound Healing    Intervention: Promote Effective Wound Healing   12/16/18 1609   Monitor and Manage Anemia   Oral Nutrition Promotion rest periods promoted;social interaction promoted;medicated   Prevent or Manage Pain   Sleep/Rest Enhancement awakenings minimized         Problem: Infection  Goal: Infection Symptom Resolution    Intervention: Prevent or Manage Infection   12/16/18 1609   Manage Diarrhea   Isolation Precautions contact precautions maintained

## 2018-12-16 NOTE — NURSING
Pt transported to telemetry room 337B via bed on portable cardiac monitor and O2 with ICU charge RN and house supervisor.  Report given to receiving telemetry RN with all questions answered.  Pt's son, Tre, at bedside.

## 2018-12-16 NOTE — PLAN OF CARE
Problem: Physical Therapy Goal  Goal: Physical Therapy Goal  Goals to be met by: 2018     Patient will increase functional independence with mobility by performin. Supine to sit with Modified [place]  2. Sit to stand transfer with Minimal Assistance  3. Gait to e assessed.   4. Lower extremity exercise program x10 reps per handout, with supervision  5.  Bed to chair t/f with Min A  6.  W/C propulsion x 150' with Supervision    Patient with difficulty maintaining weight bearing precautions despite max verbal cueing and education to comply with NWB LLE. Patient with improved sitting balance and tolerance today. Transfer training: sit<>Stand x 4 trials with Keely and cues to maintain NWB of left lower extremity.       Outcome: Ongoing (interventions implemented as appropriate)  Patient with difficulty maintaining weight bearing precautions despite max verbal cueing and education to comply with NWB LLE. Patient with improved sitting balance and tolerance today. Transfer training: sit<>Stand x 4 trials with Keely and cues to maintain NWB of left lower extremity.

## 2018-12-16 NOTE — SUBJECTIVE & OBJECTIVE
Interval History: Feeling well.    Review of Systems   HENT: Negative for ear discharge and ear pain.    Eyes: Negative for pain and itching.   Endocrine: Negative for polyphagia and polyuria.   Neurological: Negative for seizures and syncope.     Objective:     Vital Signs (Most Recent):  Temp: 97.4 °F (36.3 °C) (12/16/18 1134)  Pulse: 89 (12/16/18 1134)  Resp: 16 (12/16/18 1134)  BP: (!) 104/54 (12/16/18 1134)  SpO2: 97 % (12/16/18 1134) Vital Signs (24h Range):  Temp:  [97.4 °F (36.3 °C)-99.3 °F (37.4 °C)] 97.4 °F (36.3 °C)  Pulse:  [80-89] 89  Resp:  [13-23] 16  SpO2:  [96 %-99 %] 97 %  BP: (104-124)/(54-61) 104/54     Weight: 99.8 kg (220 lb 0.3 oz)  Body mass index is 31.57 kg/m².    Intake/Output Summary (Last 24 hours) at 12/16/2018 1333  Last data filed at 12/16/2018 0251  Gross per 24 hour   Intake 200 ml   Output 250 ml   Net -50 ml      Physical Exam   Constitutional: He is oriented to person, place, and time. He appears well-developed and well-nourished. No distress.   HENT:   Head: Normocephalic and atraumatic.   Mouth/Throat: Oropharynx is clear and moist.   Cardiovascular: Normal rate, regular rhythm, normal heart sounds and intact distal pulses. Exam reveals no gallop and no friction rub.   No murmur heard.  Pulmonary/Chest: Effort normal and breath sounds normal. No stridor. No respiratory distress. He has no wheezes. He has no rales.   Abdominal: Soft. Bowel sounds are normal. He exhibits no mass. There is no tenderness. There is no guarding.   Bandage in RLQ dry and intact   Musculoskeletal: He exhibits edema.   Neurological: He is alert and oriented to person, place, and time.   Skin: He is not diaphoretic.   Bilateral feet dressed   Nursing note and vitals reviewed.      Significant Labs:   BMP:   Recent Labs   Lab 12/16/18  0320   *      K 3.8      CO2 30*   BUN 28*   CREATININE 0.7   CALCIUM 8.3*     CBC:   Recent Labs   Lab 12/15/18  0215 12/16/18  0320   WBC 6.40 6.69    HGB 8.6* 8.4*   HCT 26.8* 26.4*    282

## 2018-12-16 NOTE — PT/OT/SLP PROGRESS
Physical Therapy Treatment    Patient Name:  Marvin Ray   MRN:  [id number]    Recommendations:     Discharge Recommendations:  nursing facility, skilled   Discharge Equipment Recommendations: none   Barriers to discharge: Decreased caregiver support    Assessment:     Marvin Ray is a 58 y.o. male admitted with a medical diagnosis of Cardiac arrest.  He presents with the following impairments/functional limitations:  weakness, impaired endurance, impaired self care skills, gait instability, impaired functional mobilty, orthopedic precautions, impaired cardiopulmonary response to activity, decreased safety awareness, decreased lower extremity function Patient with difficulty maintaining weight bearing precautions despite max verbal cueing and education to comply with NWB LLE. Patient with improved sitting balance and tolerance today. Transfer training: sit<>Stand x 4 trials with Keely and cues to maintain NWB of left lower extremity. .    Rehab Prognosis:  Good; patient would benefit from acute skilled PT services to address these deficits and reach maximum level of function.      Recent Surgery: * No surgery found *      Plan:     During this hospitalization, patient to be seen daily to address the above listed problems via gait training, therapeutic activities, therapeutic exercises, neuromuscular re-education, wheelchair management/training  · Plan of Care Expires:  12/29/18   Plan of Care Reviewed with: patient, sibling    Subjective     Communicated with patient and nursing  prior to session.  Patient found supine in bed  upon PT entry to room, agreeable to treatment.      Chief Complaint: Fatigue and LLE pain and chest pain due to having CPR performed on him   Patient comments/goals: To get stronger and go home   Pain/Comfort:  · Pain Rating 1: 0/10    Patients cultural, spiritual, Lutheran conflicts given the current situation:      Objective:     Patient found with: oxygen, peripheral IV      General Precautions: Standard, respiratory, fall   Orthopedic Precautions:LLE non weight bearing   Braces:       Functional Mobility:  · Bed Mobility:     · Rolling Left:  minimum assistance  · Rolling Right: minimum assistance  · Supine to Sit: minimum assistance  · Sit to Supine: contact guard assistance  · Transfers:     · Sit to Stand:  minimum assistance with rolling walker  · Gait: No gait today as patient unable to maintain NWB of LLE       AM-PAC 6 CLICK MOBILITY  Turning over in bed (including adjusting bedclothes, sheets and blankets)?: 3  Sitting down on and standing up from a chair with arms (e.g., wheelchair, bedside commode, etc.): 2  Moving from lying on back to sitting on the side of the bed?: 2  Moving to and from a bed to a chair (including a wheelchair)?: 2  Need to walk in hospital room?: 1  Climbing 3-5 steps with a railing?: 1  Basic Mobility Total Score: 11       Therapeutic Activities and Exercises:   Patient performed seated bilateral lower extremity therapeutic exercises including ankle pumps, long arc quads, hip flexion and hip adduction (pillow squeezes) 2 x 10 reps each with a rest break between sets due to fatiuge.     Patient left supine with all lines intact, call button in reach, bed alarm on and family present..    GOALS:   Multidisciplinary Problems     Physical Therapy Goals        Problem: Physical Therapy Goal    Goal Priority Disciplines Outcome Goal Variances Interventions   Physical Therapy Goal     PT, PT/OT Ongoing (interventions implemented as appropriate)     Description:  Goals to be met by: 2018     Patient will increase functional independence with mobility by performin. Supine to sit with Modified [place]  2. Sit to stand transfer with Minimal Assistance  3. Gait to e assessed.   4. Lower extremity exercise program x10 reps per handout, with supervision  5.  Bed to chair t/f with Min A  6.  W/C propulsion x 150' with Supervision                       Time Tracking:     PT Received On: 12/16/18  PT Start Time: 0845     PT Stop Time: 0904  PT Total Time (min): 19 min     Billable Minutes: Therapeutic Activity 9 and Therapeutic Exercise 10    Treatment Type: Treatment  PT/PTA: PTA     PTA Visit Number: 1     Jennifer Thomas PTA  12/16/2018

## 2018-12-16 NOTE — ASSESSMENT & PLAN NOTE
Patient has severe 3v disease per [facility] in 2016 with no targets for PCI or CABG  Continue asa. Added plavix (discussed with Juan Antonio). Continue lipid management. Hold BB and ACEi for hypotension

## 2018-12-17 LAB
ANION GAP SERPL CALC-SCNC: 7 MMOL/L
BASOPHILS # BLD AUTO: 0.03 K/UL
BASOPHILS NFR BLD: 0.4 %
BUN SERPL-MCNC: 32 MG/DL
CALCIUM SERPL-MCNC: 8.2 MG/DL
CHLORIDE SERPL-SCNC: 100 MMOL/L
CO2 SERPL-SCNC: 30 MMOL/L
CREAT SERPL-MCNC: 0.7 MG/DL
DIFFERENTIAL METHOD: ABNORMAL
EOSINOPHIL # BLD AUTO: 0.3 K/UL
EOSINOPHIL NFR BLD: 4.6 %
ERYTHROCYTE [DISTWIDTH] IN BLOOD BY AUTOMATED COUNT: 15.8 %
EST. GFR  (AFRICAN AMERICAN): >60 ML/MIN/1.73 M^2
EST. GFR  (NON AFRICAN AMERICAN): >60 ML/MIN/1.73 M^2
GLUCOSE SERPL-MCNC: 155 MG/DL
HCT VFR BLD AUTO: 28.2 %
HGB BLD-MCNC: 8.9 G/DL
LYMPHOCYTES # BLD AUTO: 0.5 K/UL
LYMPHOCYTES NFR BLD: 7.3 %
MCH RBC QN AUTO: 30 PG
MCHC RBC AUTO-ENTMCNC: 31.6 G/DL
MCV RBC AUTO: 95 FL
MONOCYTES # BLD AUTO: 0.8 K/UL
MONOCYTES NFR BLD: 10.7 %
NEUTROPHILS # BLD AUTO: 5.7 K/UL
NEUTROPHILS NFR BLD: 77 %
PLATELET # BLD AUTO: 308 K/UL
PMV BLD AUTO: 9.1 FL
POCT GLUCOSE: 163 MG/DL (ref 70–110)
POCT GLUCOSE: 164 MG/DL (ref 70–110)
POCT GLUCOSE: 165 MG/DL (ref 70–110)
POCT GLUCOSE: 191 MG/DL (ref 70–110)
POTASSIUM SERPL-SCNC: 4 MMOL/L
RBC # BLD AUTO: 2.97 M/UL
SODIUM SERPL-SCNC: 137 MMOL/L
WBC # BLD AUTO: 7.39 K/UL

## 2018-12-17 PROCEDURE — 97110 THERAPEUTIC EXERCISES: CPT

## 2018-12-17 PROCEDURE — 36415 COLL VENOUS BLD VENIPUNCTURE: CPT

## 2018-12-17 PROCEDURE — 80048 BASIC METABOLIC PNL TOTAL CA: CPT

## 2018-12-17 PROCEDURE — 63600175 PHARM REV CODE 636 W HCPCS: Performed by: HOSPITALIST

## 2018-12-17 PROCEDURE — 97542 WHEELCHAIR MNGMENT TRAINING: CPT

## 2018-12-17 PROCEDURE — 63600175 PHARM REV CODE 636 W HCPCS: Performed by: NURSE PRACTITIONER

## 2018-12-17 PROCEDURE — 25000003 PHARM REV CODE 250: Performed by: INTERNAL MEDICINE

## 2018-12-17 PROCEDURE — 25000003 PHARM REV CODE 250: Performed by: HOSPITALIST

## 2018-12-17 PROCEDURE — 97535 SELF CARE MNGMENT TRAINING: CPT

## 2018-12-17 PROCEDURE — 21400001 HC TELEMETRY ROOM

## 2018-12-17 PROCEDURE — 63600175 PHARM REV CODE 636 W HCPCS: Performed by: INTERNAL MEDICINE

## 2018-12-17 PROCEDURE — 85025 COMPLETE CBC W/AUTO DIFF WBC: CPT

## 2018-12-17 PROCEDURE — A4216 STERILE WATER/SALINE, 10 ML: HCPCS | Performed by: INTERNAL MEDICINE

## 2018-12-17 PROCEDURE — 25000003 PHARM REV CODE 250: Performed by: NURSE PRACTITIONER

## 2018-12-17 PROCEDURE — 25000003 PHARM REV CODE 250: Performed by: EMERGENCY MEDICINE

## 2018-12-17 RX ORDER — FUROSEMIDE 40 MG/1
40 TABLET ORAL DAILY
Status: DISCONTINUED | OUTPATIENT
Start: 2018-12-17 | End: 2018-12-19

## 2018-12-17 RX ADMIN — OXYCODONE AND ACETAMINOPHEN 1 TABLET: 5; 325 TABLET ORAL at 04:12

## 2018-12-17 RX ADMIN — EZETIMIBE 10 MG: 10 TABLET ORAL at 08:12

## 2018-12-17 RX ADMIN — MEROPENEM 2 G: 1 INJECTION, POWDER, FOR SOLUTION INTRAVENOUS at 03:12

## 2018-12-17 RX ADMIN — INSULIN ASPART 3 UNITS: 100 INJECTION, SOLUTION INTRAVENOUS; SUBCUTANEOUS at 11:12

## 2018-12-17 RX ADMIN — DORZOLAMIDE HYDROCHLORIDE AND TIMOLOL MALEATE 1 DROP: 20; 5 SOLUTION OPHTHALMIC at 09:12

## 2018-12-17 RX ADMIN — Medication 10 ML: at 08:12

## 2018-12-17 RX ADMIN — Medication 10 ML: at 02:12

## 2018-12-17 RX ADMIN — INSULIN ASPART 1 UNITS: 100 INJECTION, SOLUTION INTRAVENOUS; SUBCUTANEOUS at 09:12

## 2018-12-17 RX ADMIN — ALLOPURINOL 200 MG: 100 TABLET ORAL at 08:12

## 2018-12-17 RX ADMIN — DORZOLAMIDE HYDROCHLORIDE AND TIMOLOL MALEATE 1 DROP: 20; 5 SOLUTION OPHTHALMIC at 04:12

## 2018-12-17 RX ADMIN — Medication 10 ML: at 01:12

## 2018-12-17 RX ADMIN — STANDARDIZED SENNA CONCENTRATE AND DOCUSATE SODIUM 1 TABLET: 8.6; 5 TABLET, FILM COATED ORAL at 08:12

## 2018-12-17 RX ADMIN — STANDARDIZED SENNA CONCENTRATE AND DOCUSATE SODIUM 1 TABLET: 8.6; 5 TABLET, FILM COATED ORAL at 09:12

## 2018-12-17 RX ADMIN — BRIMONIDINE TARTRATE 1 DROP: 1 SOLUTION/ DROPS OPHTHALMIC at 08:12

## 2018-12-17 RX ADMIN — INSULIN ASPART 2 UNITS: 100 INJECTION, SOLUTION INTRAVENOUS; SUBCUTANEOUS at 04:12

## 2018-12-17 RX ADMIN — ASPIRIN 81 MG 81 MG: 81 TABLET ORAL at 08:12

## 2018-12-17 RX ADMIN — DORZOLAMIDE HYDROCHLORIDE AND TIMOLOL MALEATE 1 DROP: 20; 5 SOLUTION OPHTHALMIC at 08:12

## 2018-12-17 RX ADMIN — MEROPENEM 2 G: 1 INJECTION, POWDER, FOR SOLUTION INTRAVENOUS at 07:12

## 2018-12-17 RX ADMIN — FUROSEMIDE 40 MG: 40 TABLET ORAL at 11:12

## 2018-12-17 RX ADMIN — BRIMONIDINE TARTRATE 1 DROP: 1 SOLUTION/ DROPS OPHTHALMIC at 09:12

## 2018-12-17 RX ADMIN — INSULIN ASPART 3 UNITS: 100 INJECTION, SOLUTION INTRAVENOUS; SUBCUTANEOUS at 08:12

## 2018-12-17 RX ADMIN — VANCOMYCIN HYDROCHLORIDE 1000 MG: 1 INJECTION, POWDER, LYOPHILIZED, FOR SOLUTION INTRAVENOUS at 02:12

## 2018-12-17 RX ADMIN — VANCOMYCIN HYDROCHLORIDE 1000 MG: 1 INJECTION, POWDER, LYOPHILIZED, FOR SOLUTION INTRAVENOUS at 01:12

## 2018-12-17 RX ADMIN — OMEGA-3 FATTY ACIDS CAP 1000 MG 1 CAPSULE: 1000 CAP at 08:12

## 2018-12-17 RX ADMIN — INSULIN ASPART 2 UNITS: 100 INJECTION, SOLUTION INTRAVENOUS; SUBCUTANEOUS at 11:12

## 2018-12-17 RX ADMIN — FUROSEMIDE 40 MG: 10 INJECTION, SOLUTION INTRAMUSCULAR; INTRAVENOUS at 08:12

## 2018-12-17 RX ADMIN — INSULIN ASPART 3 UNITS: 100 INJECTION, SOLUTION INTRAVENOUS; SUBCUTANEOUS at 04:12

## 2018-12-17 RX ADMIN — Medication: at 09:12

## 2018-12-17 RX ADMIN — COLLAGENASE SANTYL: 250 OINTMENT TOPICAL at 02:12

## 2018-12-17 RX ADMIN — CLOPIDOGREL BISULFATE 75 MG: 75 TABLET ORAL at 08:12

## 2018-12-17 RX ADMIN — ENOXAPARIN SODIUM 40 MG: 100 INJECTION SUBCUTANEOUS at 04:12

## 2018-12-17 RX ADMIN — Medication 10 ML: at 07:12

## 2018-12-17 RX ADMIN — OMEGA-3 FATTY ACIDS CAP 1000 MG 1 CAPSULE: 1000 CAP at 09:12

## 2018-12-17 RX ADMIN — PREDNISOLONE ACETATE 1 DROP: 10 SUSPENSION/ DROPS OPHTHALMIC at 08:12

## 2018-12-17 RX ADMIN — MEROPENEM 2 G: 1 INJECTION, POWDER, FOR SOLUTION INTRAVENOUS at 12:12

## 2018-12-17 RX ADMIN — Medication: at 08:12

## 2018-12-17 NOTE — PROGRESS NOTES
[facility]  Progress Note    Patient Name: Marvin Ray  MRN: [id number]  Patient Class: IP- Inpatient   Admission Date: 12/10/2018  Length of Stay: 6 days  Attending Physician: Brando Tomlinson MD  Primary Care Provider: Rubén Davis MD        Subjective:     Principal Problem:Cardiac arrest    HPI:  58 y.o. male with DM2, HLD, HTN, CAD, chronic combined heart failure, CKD stage III presents with a complaint of left foot wound infection.  Prior cultures show only sensitive to meropenem.  Outpatient IV antibiotics were attempted to be setup last night and throughout the day today through his PCP but were unable to be procured.  He denies fever, chills, cough, SOB, chest pain, palpitations, headaches, vision changes, N/V/D, abdominal pain, or dysuria.  Placed in observation for PICC line placement and to setup home IV abx.    Hospital Course:  Patient admitted for PICC line placement for IV antibiotics for left wound infection -12/5/18 wound culture Acinetobacter baumannii/haemolyticus sensitive to meropenem. IV meropenum started in ED. On 12/11/18, patient cardiac arrest with ROSC after ACLS. Per sister, he was standing getting ready to leave the hospital and then fell back into the bed. Patient intubated on 12/11 and transferred to ICU. Narcan given prior to transfer to ICU.   Sedated with propofol. Hypotensive after arrest requiring levophed that has been weaned. Cardiology and Pulmonary consulted. ICD interrogated- no events, no shocks. Possibly PEA arrest due to severe infection with underlying biventricular failure and severe CAD? TTE with EF 30%, diastolic dysfunction, new RV changes. CT negative for PE. Troponins elevated at 0.5 but downtrended. Initially started asa, plavix, and full dose lovenox with concern for NSTEMI, but unlikely NSTEMI given minimal troponin elevations that could be explained by chest compressions alone; lovenox discontinued. Will continue  DAPT given severe CAD with no intervention (not a candidate for PCI or CABG given anatomy). Blood cultures checked and NGTD. Wound care following for DM foot wounds. Abdominal US with ascites s/p paracentesis with 2.4L of clear light green ascites removed on 12/12. SAAG 0.6 which is NOT consistent with portal hypertension. TP 4.2 which would be consistent with cardiac etiology. No SBP. No pancreatitis on CT abd/pelvis and none clinically either. UA and UPC does not show protein; ascites not from nephrotic syndrome. Cytology and AFB culture and smear on ascitic fluid pending. Patient extubated to BiPAP on 12/13, weaned to nasal cannula. Levophed off since 12/14 at 5pm. Continues to improve. Stable for step down to floor on 12/15.  PT/OT recommending SNF.    Interval History: Feeling better.    Review of Systems   HENT: Negative for ear discharge and ear pain.    Eyes: Negative for pain and itching.   Endocrine: Negative for polyphagia and polyuria.   Neurological: Negative for seizures and syncope.     Objective:     Vital Signs (Most Recent):  Temp: 98.5 °F (36.9 °C) (12/17/18 1138)  Pulse: 86 (12/17/18 1138)  Resp: 18 (12/17/18 1138)  BP: (!) 107/56 (12/17/18 1138)  SpO2: 97 % (12/17/18 1138) Vital Signs (24h Range):  Temp:  [97.8 °F (36.6 °C)-98.7 °F (37.1 °C)] 98.5 °F (36.9 °C)  Pulse:  [77-86] 86  Resp:  [16-19] 18  SpO2:  [92 %-99 %] 97 %  BP: (103-108)/(56-58) 107/56     Weight: 99.8 kg (220 lb 0.3 oz)  Body mass index is 31.57 kg/m².    Intake/Output Summary (Last 24 hours) at 12/17/2018 1601  Last data filed at 12/17/2018 0300  Gross per 24 hour   Intake 810 ml   Output 550 ml   Net 260 ml      Physical Exam   Constitutional: He is oriented to person, place, and time. He appears well-developed and well-nourished. No distress.   HENT:   Head: Normocephalic and atraumatic.   Mouth/Throat: Oropharynx is clear and moist.   Cardiovascular: Normal rate, regular rhythm, normal heart sounds and intact distal pulses.  Exam reveals no gallop and no friction rub.   No murmur heard.  Pulmonary/Chest: Effort normal and breath sounds normal. No stridor. No respiratory distress. He has no wheezes. He has no rales.   Abdominal: Soft. Bowel sounds are normal. He exhibits no mass. There is no tenderness. There is no guarding.   Bandage in RLQ dry and intact   Musculoskeletal: He exhibits edema.   Neurological: He is alert and oriented to person, place, and time.   Skin: He is not diaphoretic.   Bilateral feet dressed   Nursing note and vitals reviewed.      Significant Labs:   BMP:   Recent Labs   Lab 12/17/18  0600   *      K 4.0      CO2 30*   BUN 32*   CREATININE 0.7   CALCIUM 8.2*     CBC:   Recent Labs   Lab 12/16/18  0320 12/17/18  0600   WBC 6.69 7.39   HGB 8.4* 8.9*   HCT 26.4* 28.2*    308     Assessment/Plan:      * Cardiac arrest    On 12/11  PEA? ICD interrogated with no shocks or abnormal rhythms.  Etiology unclear. No PE, no NSTEMI, no electrolyte abnormalities, no hypoxia, no toxins  May be PEA arrest due to severe infection in setting of biventricular cardiomyopathy and severe CAD  Now stable on Telemetry.  PT/OT consulted and recommending SNF.  SW consult.       Diabetic foot infection    Tissue culture obtained by Vascular Surgery obtained 12/5/18 grew Acinetobacter baumannii/haemolyticus sensitive to meropenem.    ID consulted- recommend meropenem and vanc x6 weeks   PICC in place R arm for outpatient antibiotics  Wound care following  ESR 72, CRP 89 on 12/11- trend weekly  Poor wound healing likely given PAD and DM with extensive wound. Discussed with patient on 12/14  Will need ID, Vascular, Wound care follow up after discharge     Goals of care, counseling/discussion    Discussed multiple medical problems and code with patient today. Asked him to discuss further with family. Needs LAPOST prior to discharge. Family says he is still legally  but not in contact with wife- also needs  HCPOA documentation prior to discharge.        Acute renal insufficiency    Due to cardiac arrest. Cr up to 1.4 from baseline ~0.8. Now resolved.        Severe malnutrition    Encourage oral intake       Other ascites    Abdominal US with ascites  Previous paracentesis in 10/2018 with SAAG 0.9, not consistent with portal hypertension  Repeat paracentesis 12/12 with light green clear fluid. SAAG 0.6. Total protein 4.2.  with 196 PMNs, not consistent with SBP.   Ascites cytology and AFB culture and smear pending  UA negative for protein, UPC not consistent with nephrotic syndrome  Not clinically or radiologically consistent with pancreatitis  GI consulted in 10/2018 with prior paracentesis and suggested it could be due to CHF. Total protein is consistent with this but SAAG is not       Elevated troponin    Troponin elevated to 0.5 max with new TWIs  Initially started NSTEMI protocol on 12/11 with concern for possible NSTEMI as cause of cardiac arrest  Troponins downtrended  Discontinued weight based lovenox on 12/12 after discussion with Cardiology       Shock    Hypotensive after code on 12/11 and required levophed. Levophed off since 12/14 at 5pm  Exact cause of shock unclear       Acute hypoxemic respiratory failure    Intubated 12/11 post code  Ascites and bilateral effusions cause decreased lung compliance-- paracentesis 12/12 with 2.4L off  Extubated to BiPAP on 12/13  Now weaned to NC, likely can be weaned further to room air       Chronic combined systolic and diastolic heart failure    No evidence of decompensation on admit  Post code BNP elevated, though this is expected  Patient has ascites and bilateral pleural effusions. Does not have significant pulmonary edema  Will change lasix to 40mg IV daily- patient has good UOP and improving Cr with this regimen  Likely will need to add spironolactone for ascites and CHF when tolerated by BP  Monitor UOP    TTE 12/11  · Severely decreased left ventricular  systolic function. The estimated ejection fraction is 20%  · Severe global hypokinetic wall motion. Cannot exclude RWMA.  · Septal wall has abnormal motion. Systolic and diastolic flattening of the interventricular septum consistent with right ventricle pressure and volume overload.  · Left ventricular diastolic dysfunction.  · Mild right ventricular enlargement.  · Mildly to moderately reduced right ventricular systolic function.  · Moderate tricuspid regurgitation.  · There is a large left pleural effusion.     LAYNE (acute kidney injury)    Baseline Cr ~0.9  Cr increased to 1.4 post code, now improved back to baseline  Continue to monitor  Renal dose all meds, avoid nephrotoxins   Resolved      PVD (peripheral vascular disease)    Continue asa, plavix, lipid reduction meds       CAD (coronary artery disease)    Patient has severe 3v disease per [facility] in 2016 with no targets for PCI or CABG  Continue asa. Added plavix (discussed with Juan Antonio). Continue lipid management. Hold BB and ACEi for hypotension       Essential hypertension    Hold all BP meds for now  Currently normotensive off BP meds.     Tophaceous gout    Continue home allopurinol       Neovascular glaucoma of both eyes    Continue eye drops       HLD (hyperlipidemia)    Last lipids 10/2018 are well controlled  Continue home regimen of ezetimibe and fish oil (statin intolerance)     DM type 2 with diabetic peripheral neuropathy    Last HgbA1c   Lab Results   Component Value Date    HGBA1C 8.3 (H) 10/30/2018   Hold oral antihyperglycemics while inpatient    Meds: detemir 5 + SSI  ACHS accuchecks, ADA diet, hypoglycemic protocol          VTE Risk Mitigation (From admission, onward)        Ordered     enoxaparin injection 40 mg  Daily      12/12/18 0820     IP VTE HIGH RISK PATIENT  Once      12/10/18 3311              Brando Tomlinson MD  Department of Hospital Medicine   [facility]

## 2018-12-17 NOTE — NURSING
Bedside report given to nurse Andrade. Pt is AAO. Family at bedside. Pt denies needs. Safety maintained.

## 2018-12-17 NOTE — PROGRESS NOTES
TN met with patient and patient's sisters to discuss patient's discharge plan. TN informed patient and family that SNF has been recommended by PT. Patient and family are in agreement. TN provided a list of SNF facilities to review. Patient's sister Chloé selected; [facility], [facility], and [facility] as their first preferences.     1519- TN sent patient's clinicals (Packet, PT/OT notes, MD note, Woundcare note, and MAR) to SNF facilities. Awaiting feedback.

## 2018-12-17 NOTE — PROGRESS NOTES
[facility]  Infectious Disease  Progress Note    Patient Name: Marvin Ray  MRN: [id number]  Admission Date: 12/10/2018  Length of Stay: 6 days  Attending Physician: Brando Tomlinson MD  Primary Care Provider: Rubén Davis MD    Patient off floor. Will see again on Wednesday. Please call for questions before then.      Jorgito Velez MD  Infectious Disease  [facility]

## 2018-12-17 NOTE — ASSESSMENT & PLAN NOTE
On 12/11  PEA? ICD interrogated with no shocks or abnormal rhythms.  Etiology unclear. No PE, no NSTEMI, no electrolyte abnormalities, no hypoxia, no toxins  May be PEA arrest due to severe infection in setting of biventricular cardiomyopathy and severe CAD  Now stable on Telemetry.  PT/OT consulted and recommending SNF.  SW consult.

## 2018-12-17 NOTE — PT/OT/SLP PROGRESS
"Physical Therapy Treatment    Patient Name:  Marvin Ray   MRN:  [id number]    Recommendations:     Discharge Recommendations:  nursing facility, skilled   Discharge Equipment Recommendations: none   Barriers to discharge: Decreased caregiver support    Assessment:     Marvin Ray is a 58 y.o. male admitted with a medical diagnosis of Cardiac arrest.  He presents with the following impairments/functional limitations:  weakness, impaired functional mobilty, impaired balance, decreased upper extremity function, decreased safety awareness, impaired coordination, impaired skin, impaired cardiopulmonary response to activity, orthopedic precautions, impaired fine motor, decreased lower extremity function, decreased coordination, gait instability, impaired self care skills, impaired endurance Pt making slow progress toward PT goals.  Remains appropriate for SNF, but pt adamant about going home.    Rehab Prognosis:  Good; patient would benefit from acute skilled PT services to address these deficits and reach maximum level of function.      Recent Surgery: * No surgery found *      Plan:     During this hospitalization, patient to be seen daily to address the above listed problems via gait training, therapeutic activities, therapeutic exercises, wheelchair management/training  · Plan of Care Expires:  12/29/18   Plan of Care Reviewed with: patient, caregiver    Subjective     Communicated with nsg prior to session.  Patient found HOB elevated upon PT entry to room, agreeable to treatment.      Chief Complaint: campbell not want to go to SNF  Patient comments/goals: States" I will just put my foot down when I get home" referring to his NWB status  Pain/Comfort:  · Pain Rating 1: 0/10    Patients cultural, spiritual, Uatsdin conflicts given the current situation:      Objective:     Patient found with: oxygen, peripheral IV     General Precautions: Standard, fall, respiratory   Orthopedic Precautions:LLE non weight " bearing   Braces: (Darco shoe)     Functional Mobility:  · Bed Mobility:     · Scooting: stand by assistance  · Bridging: stand by assistance  · Supine to Sit: stand by assistance  · Sit to Supine: stand by assistance  · Transfers:     · Sit to Stand:  maximal assistance and of 2 persons with rolling walker and VC for hand placement and safety and Min A for NWB L LE  · Bed to Chair: minimum assistance with  slide board  using  VC for hand placement/safety and Min A for NWB L LE  · Chair to bed:  same  · Gait: unable  · Balance: Fair+ sit, Fair- stand      AM-PAC 6 CLICK MOBILITY  Turning over in bed (including adjusting bedclothes, sheets and blankets)?: 3  Sitting down on and standing up from a chair with arms (e.g., wheelchair, bedside commode, etc.): 2  Moving from lying on back to sitting on the side of the bed?: 3  Moving to and from a bed to a chair (including a wheelchair)?: 3  Need to walk in hospital room?: 1  Climbing 3-5 steps with a railing?: 1  Basic Mobility Total Score: 13       Therapeutic Activities and Exercises:  Pt performed B AP's, TKE's, GS, QS, Hip ABd/ADd x 10 reps in reclined position in bed.  Pt sat at EOB with SBA static and CGA dynamic a Total of approx 20m.  Instructed pt on breathing in through nose and out through mouth throughout treatment session.  Pt able to return demonstration with VC.  Pt able to stand with mod A and RW approx 10 sec.  Pt unable to maintain NWB status.  T/f tr as above.  Pt performed partial stand from bed with Max A x 2 people x 10 reps.  Pt propelled W/C with CGA 4x20' with VC for steering and 4 seated rest breaks for SOB.  Pt Mod Indep for W/C brake management and Min A for Armrest management.    Patient left with bed in chair position with all lines intact, call button in reach, nsg notified and family and OT present..    GOALS:   Multidisciplinary Problems     Physical Therapy Goals        Problem: Physical Therapy Goal    Goal Priority Disciplines Outcome  Goal Variances Interventions   Physical Therapy Goal     PT, PT/OT Ongoing (interventions implemented as appropriate)     Description:  Goals to be met by: 2018     Patient will increase functional independence with mobility by performin. Supine to sit with Modified [place]  2. Sit to stand transfer with Minimal Assistance  3. Gait to e assessed.   4. Lower extremity exercise program x10 reps per handout, with supervision  5.  Bed to chair t/f with Min A  6.  W/C propulsion x 150' with Supervision                      Time Tracking:     PT Received On: 18  PT Start Time: 1120     PT Stop Time: 1210  PT Total Time (min): 50 min     Billable Minutes: Therapeutic Exercise 10 and Train/Wheelchair Management 13    Treatment Type: Treatment  PT/PTA: PT     PTA Visit Number: 0     Consuelo Foote, PT  2018

## 2018-12-17 NOTE — PLAN OF CARE
Problem: Wound  Goal: Optimal Wound Healing    Intervention: Promote Effective Wound Healing   12/11/18 1245 12/16/18 1609   Monitor and Manage Anemia   Oral Nutrition Promotion --  rest periods promoted;social interaction promoted;medicated   Prevent or Manage Pain   Pain Management Interventions pain management plan reviewed with patient/caregiver;relaxation techniques promoted;quiet environment facilitated;position adjusted;care clustered --    Prevent or Manage Pain   Sleep/Rest Enhancement --  awakenings minimized         Comments: Dressing to BLE remains clean dry and intact. Off loading boots in place. Pt did well with repositioning self. No new skin issues. No c/o pain during shift. Sister remains at bedside.

## 2018-12-17 NOTE — PT/OT/SLP PROGRESS
Occupational Therapy   Treatment    Name: Marvin Ray  MRN: [id number]  Admitting Diagnosis:  Cardiac arrest       Recommendations:     Discharge Recommendations: nursing facility, skilled  Discharge Equipment Recommendations:  none  Barriers to discharge:  Decreased caregiver support    Subjective     Pain/Comfort:  · Pain Rating 1: 0/10    Objective:     Communicated with: nurseCortney prior to session.  Patient found in bed with  PICC line, oxygen upon OT entry to room.    General Precautions: Standard, fall, respiratory   Orthopedic Precautions:LLE non weight bearing   Braces: (Darco shoe)     Occupational Performance:    Bed Mobility:    · Patient completed Scooting/Bridging with stand by assistance  · Patient completed Supine to Sit with stand by assistance  · Patient completed Sit to Supine with stand by assistance     Functional Mobility/Transfers:  · Patient completed Sit <> Stand Transfer with maximal assistance and of 2 persons  with  rolling walker and min assist for NWB to LLE  · Functional Mobility:W/C<>bed transfer using a sliding board with min assist and VC for technique    Activities of Daily Living:  · Upper Body Dressing: moderate assistance    · Lower Body Dressing: The patient donned the right sohoe with mod assist and max/(D) to don the Darco show to the left foot        [facility] 6 Click ADL: 17    Treatment & Education:  The patient was educated re: use of green theraband for UE HEP. The patient was able to perform B shldr horiz abd, B shldr flex and B shoulder abd and elbow flex/ext  x10 reps. The patient was given green theraband and written HEP for home use. The patient tolerated therx after demo and rest between reps.    Patient left HOB elevated with all lines intact, call button in reach and family present  Education:    Assessment:     Marvin Ray is a 58 y.o. male with a medical diagnosis of Cardiac arrest.  He presents with the following performance deficits affecting function  are weakness, impaired functional mobilty, impaired endurance, impaired self care skills, impaired balance, gait instability, decreased lower extremity function, decreased upper extremity function, decreased safety awareness, impaired skin, pain, impaired cardiopulmonary response to activity, edema, decreased coordination. The patient tolerated OT well. The patient is anxious for D/C to home but will benefit from SNF.    Rehab Prognosis:  Good; patient would benefit from acute skilled OT services to address these deficits and reach maximum level of function.       Plan:     Patient to be seen 5 x/week to address the above listed problems via self-care/home management, therapeutic activities, therapeutic exercises  · Plan of Care Expires: 12/29/18  · Plan of Care Reviewed with: patient, sibling    This Plan of care has been discussed with the patient who was involved in its development and understands and is in agreement with the identified goals and treatment plan    GOALS:   Multidisciplinary Problems     Occupational Therapy Goals        Problem: Occupational Therapy Goal    Goal Priority Disciplines Outcome Interventions   Occupational Therapy Goal     OT, PT/OT Ongoing (interventions implemented as appropriate)    Description:  Goals to be met by: 12/29/18    Patient will increase functional independence with ADLs by performing:    UE Dressing with Set-up Assistance.  LE Dressing with Set-up Assistance.  Grooming while standing at sink with Set-up Assistance.  Toileting from bedside commode with Stand-by Assistance for hygiene and clothing management.   Toilet transfer to bedside commode with Supervision.  Upper extremity exercise program with supervision.                      Time Tracking:     OT Date of Treatment: 12/17/18  OT Start Time: 1125  OT Stop Time: 1221  OT Total Time (min): 56 min    Billable Minutes:Self Care/Home Management 18  Therapeutic Exercise 15  Total Time 56 (with PT)    Celina Cai  OT  12/17/2018

## 2018-12-17 NOTE — SUBJECTIVE & OBJECTIVE
Interval History: Feeling better.    Review of Systems   HENT: Negative for ear discharge and ear pain.    Eyes: Negative for pain and itching.   Endocrine: Negative for polyphagia and polyuria.   Neurological: Negative for seizures and syncope.     Objective:     Vital Signs (Most Recent):  Temp: 98.5 °F (36.9 °C) (12/17/18 1138)  Pulse: 86 (12/17/18 1138)  Resp: 18 (12/17/18 1138)  BP: (!) 107/56 (12/17/18 1138)  SpO2: 97 % (12/17/18 1138) Vital Signs (24h Range):  Temp:  [97.8 °F (36.6 °C)-98.7 °F (37.1 °C)] 98.5 °F (36.9 °C)  Pulse:  [77-86] 86  Resp:  [16-19] 18  SpO2:  [92 %-99 %] 97 %  BP: (103-108)/(56-58) 107/56     Weight: 99.8 kg (220 lb 0.3 oz)  Body mass index is 31.57 kg/m².    Intake/Output Summary (Last 24 hours) at 12/17/2018 1601  Last data filed at 12/17/2018 0300  Gross per 24 hour   Intake 810 ml   Output 550 ml   Net 260 ml      Physical Exam   Constitutional: He is oriented to person, place, and time. He appears well-developed and well-nourished. No distress.   HENT:   Head: Normocephalic and atraumatic.   Mouth/Throat: Oropharynx is clear and moist.   Cardiovascular: Normal rate, regular rhythm, normal heart sounds and intact distal pulses. Exam reveals no gallop and no friction rub.   No murmur heard.  Pulmonary/Chest: Effort normal and breath sounds normal. No stridor. No respiratory distress. He has no wheezes. He has no rales.   Abdominal: Soft. Bowel sounds are normal. He exhibits no mass. There is no tenderness. There is no guarding.   Bandage in RLQ dry and intact   Musculoskeletal: He exhibits edema.   Neurological: He is alert and oriented to person, place, and time.   Skin: He is not diaphoretic.   Bilateral feet dressed   Nursing note and vitals reviewed.      Significant Labs:   BMP:   Recent Labs   Lab 12/17/18  0600   *      K 4.0      CO2 30*   BUN 32*   CREATININE 0.7   CALCIUM 8.2*     CBC:   Recent Labs   Lab 12/16/18  0320 12/17/18  0600   WBC 6.69 7.39    HGB 8.4* 8.9*   HCT 26.4* 28.2*    308

## 2018-12-18 PROBLEM — L08.9 DIABETIC FOOT INFECTION: Status: RESOLVED | Noted: 2018-12-10 | Resolved: 2018-12-18

## 2018-12-18 PROBLEM — E43 SEVERE MALNUTRITION: Status: RESOLVED | Noted: 2018-12-12 | Resolved: 2018-12-18

## 2018-12-18 PROBLEM — R57.9 SHOCK: Status: RESOLVED | Noted: 2018-12-11 | Resolved: 2018-12-18

## 2018-12-18 PROBLEM — I46.9 CARDIAC ARREST: Status: RESOLVED | Noted: 2018-12-11 | Resolved: 2018-12-18

## 2018-12-18 PROBLEM — E11.628 DIABETIC FOOT INFECTION: Status: RESOLVED | Noted: 2018-12-10 | Resolved: 2018-12-18

## 2018-12-18 PROBLEM — N17.9 AKI (ACUTE KIDNEY INJURY): Status: RESOLVED | Noted: 2018-10-12 | Resolved: 2018-12-18

## 2018-12-18 PROBLEM — I73.9 PVD (PERIPHERAL VASCULAR DISEASE): Chronic | Status: ACTIVE | Noted: 2018-10-10

## 2018-12-18 PROBLEM — J96.01 ACUTE HYPOXEMIC RESPIRATORY FAILURE: Status: RESOLVED | Noted: 2018-12-11 | Resolved: 2018-12-18

## 2018-12-18 LAB
ANION GAP SERPL CALC-SCNC: 7 MMOL/L
BASOPHILS # BLD AUTO: 0.04 K/UL
BASOPHILS NFR BLD: 0.6 %
BUN SERPL-MCNC: 34 MG/DL
CALCIUM SERPL-MCNC: 8.4 MG/DL
CHLORIDE SERPL-SCNC: 101 MMOL/L
CO2 SERPL-SCNC: 29 MMOL/L
CREAT SERPL-MCNC: 0.8 MG/DL
DIFFERENTIAL METHOD: ABNORMAL
EOSINOPHIL # BLD AUTO: 0.3 K/UL
EOSINOPHIL NFR BLD: 4.1 %
ERYTHROCYTE [DISTWIDTH] IN BLOOD BY AUTOMATED COUNT: 15.7 %
EST. GFR  (AFRICAN AMERICAN): >60 ML/MIN/1.73 M^2
EST. GFR  (NON AFRICAN AMERICAN): >60 ML/MIN/1.73 M^2
GLUCOSE SERPL-MCNC: 101 MG/DL
HCT VFR BLD AUTO: 27.6 %
HGB BLD-MCNC: 8.9 G/DL
LYMPHOCYTES # BLD AUTO: 0.6 K/UL
LYMPHOCYTES NFR BLD: 8.2 %
MCH RBC QN AUTO: 30.4 PG
MCHC RBC AUTO-ENTMCNC: 32.2 G/DL
MCV RBC AUTO: 94 FL
MONOCYTES # BLD AUTO: 0.9 K/UL
MONOCYTES NFR BLD: 12 %
NEUTROPHILS # BLD AUTO: 5.3 K/UL
NEUTROPHILS NFR BLD: 75.1 %
PLATELET # BLD AUTO: 318 K/UL
PMV BLD AUTO: 9.1 FL
POCT GLUCOSE: 139 MG/DL (ref 70–110)
POCT GLUCOSE: 176 MG/DL (ref 70–110)
POCT GLUCOSE: 99 MG/DL (ref 70–110)
POTASSIUM SERPL-SCNC: 4.2 MMOL/L
RBC # BLD AUTO: 2.93 M/UL
SODIUM SERPL-SCNC: 137 MMOL/L
VANCOMYCIN SERPL-MCNC: 23.8 UG/ML
VANCOMYCIN TROUGH SERPL-MCNC: 30.3 UG/ML
WBC # BLD AUTO: 7.09 K/UL

## 2018-12-18 PROCEDURE — 80048 BASIC METABOLIC PNL TOTAL CA: CPT

## 2018-12-18 PROCEDURE — 63600175 PHARM REV CODE 636 W HCPCS: Performed by: HOSPITALIST

## 2018-12-18 PROCEDURE — 97110 THERAPEUTIC EXERCISES: CPT

## 2018-12-18 PROCEDURE — 63600175 PHARM REV CODE 636 W HCPCS: Performed by: NURSE PRACTITIONER

## 2018-12-18 PROCEDURE — 25000003 PHARM REV CODE 250: Performed by: HOSPITALIST

## 2018-12-18 PROCEDURE — 36415 COLL VENOUS BLD VENIPUNCTURE: CPT

## 2018-12-18 PROCEDURE — 97530 THERAPEUTIC ACTIVITIES: CPT

## 2018-12-18 PROCEDURE — 80202 ASSAY OF VANCOMYCIN: CPT | Mod: 91

## 2018-12-18 PROCEDURE — A4216 STERILE WATER/SALINE, 10 ML: HCPCS | Performed by: INTERNAL MEDICINE

## 2018-12-18 PROCEDURE — 25000003 PHARM REV CODE 250: Performed by: NURSE PRACTITIONER

## 2018-12-18 PROCEDURE — 25000003 PHARM REV CODE 250: Performed by: INTERNAL MEDICINE

## 2018-12-18 PROCEDURE — 85025 COMPLETE CBC W/AUTO DIFF WBC: CPT

## 2018-12-18 PROCEDURE — 80202 ASSAY OF VANCOMYCIN: CPT

## 2018-12-18 PROCEDURE — 97542 WHEELCHAIR MNGMENT TRAINING: CPT

## 2018-12-18 PROCEDURE — 99024 POSTOP FOLLOW-UP VISIT: CPT | Mod: ,,, | Performed by: SURGERY

## 2018-12-18 PROCEDURE — 21400001 HC TELEMETRY ROOM

## 2018-12-18 PROCEDURE — 0JBR0ZZ EXCISION OF LEFT FOOT SUBCUTANEOUS TISSUE AND FASCIA, OPEN APPROACH: ICD-10-PCS | Performed by: SURGERY

## 2018-12-18 PROCEDURE — 25000003 PHARM REV CODE 250: Performed by: EMERGENCY MEDICINE

## 2018-12-18 RX ADMIN — Medication 10 ML: at 02:12

## 2018-12-18 RX ADMIN — Medication 10 ML: at 06:12

## 2018-12-18 RX ADMIN — Medication 10 ML: at 05:12

## 2018-12-18 RX ADMIN — INSULIN ASPART 3 UNITS: 100 INJECTION, SOLUTION INTRAVENOUS; SUBCUTANEOUS at 05:12

## 2018-12-18 RX ADMIN — Medication 10 ML: at 12:12

## 2018-12-18 RX ADMIN — PREDNISOLONE ACETATE 1 DROP: 10 SUSPENSION/ DROPS OPHTHALMIC at 09:12

## 2018-12-18 RX ADMIN — FUROSEMIDE 40 MG: 40 TABLET ORAL at 09:12

## 2018-12-18 RX ADMIN — OMEGA-3 FATTY ACIDS CAP 1000 MG 1 CAPSULE: 1000 CAP at 09:12

## 2018-12-18 RX ADMIN — CARVEDILOL 3.12 MG: 3.12 TABLET, FILM COATED ORAL at 09:12

## 2018-12-18 RX ADMIN — DORZOLAMIDE HYDROCHLORIDE AND TIMOLOL MALEATE 1 DROP: 20; 5 SOLUTION OPHTHALMIC at 03:12

## 2018-12-18 RX ADMIN — ENOXAPARIN SODIUM 40 MG: 100 INJECTION SUBCUTANEOUS at 05:12

## 2018-12-18 RX ADMIN — EZETIMIBE 10 MG: 10 TABLET ORAL at 09:12

## 2018-12-18 RX ADMIN — ALLOPURINOL 200 MG: 100 TABLET ORAL at 10:12

## 2018-12-18 RX ADMIN — OXYCODONE AND ACETAMINOPHEN 1 TABLET: 5; 325 TABLET ORAL at 05:12

## 2018-12-18 RX ADMIN — INSULIN ASPART 1 UNITS: 100 INJECTION, SOLUTION INTRAVENOUS; SUBCUTANEOUS at 09:12

## 2018-12-18 RX ADMIN — MEROPENEM 2 G: 1 INJECTION, POWDER, FOR SOLUTION INTRAVENOUS at 10:12

## 2018-12-18 RX ADMIN — Medication: at 09:12

## 2018-12-18 RX ADMIN — INSULIN ASPART 3 UNITS: 100 INJECTION, SOLUTION INTRAVENOUS; SUBCUTANEOUS at 11:12

## 2018-12-18 RX ADMIN — STANDARDIZED SENNA CONCENTRATE AND DOCUSATE SODIUM 1 TABLET: 8.6; 5 TABLET, FILM COATED ORAL at 09:12

## 2018-12-18 RX ADMIN — INSULIN ASPART 3 UNITS: 100 INJECTION, SOLUTION INTRAVENOUS; SUBCUTANEOUS at 09:12

## 2018-12-18 RX ADMIN — ASPIRIN 81 MG 81 MG: 81 TABLET ORAL at 09:12

## 2018-12-18 RX ADMIN — DORZOLAMIDE HYDROCHLORIDE AND TIMOLOL MALEATE 1 DROP: 20; 5 SOLUTION OPHTHALMIC at 09:12

## 2018-12-18 RX ADMIN — OXYCODONE AND ACETAMINOPHEN 1 TABLET: 5; 325 TABLET ORAL at 09:12

## 2018-12-18 RX ADMIN — MEROPENEM 2 G: 1 INJECTION, POWDER, FOR SOLUTION INTRAVENOUS at 02:12

## 2018-12-18 RX ADMIN — MEROPENEM 2 G: 1 INJECTION, POWDER, FOR SOLUTION INTRAVENOUS at 05:12

## 2018-12-18 RX ADMIN — BRIMONIDINE TARTRATE 1 DROP: 1 SOLUTION/ DROPS OPHTHALMIC at 09:12

## 2018-12-18 RX ADMIN — CLOPIDOGREL BISULFATE 75 MG: 75 TABLET ORAL at 09:12

## 2018-12-18 NOTE — PHYSICIAN QUERY
PT Name: Marvin Ray  MR #: [id number]    Physician Query Form - Perfusion Diagnosis Clarification     CDS/: Nicolette Baker               Contact information:[email or web address]  This form is a permanent document in the medical record.     Query Date: December 18, 2018    By submitting this query, we are merely seeking further clarification of documentation. Please utilize your independent clinical judgment when addressing the question(s) below.    The medical record contains the following:    Indicators   Supporting Clinical Findings   Location in Medical Record   x Acute Illness (e.g. AMI, Sepsis, etc.) Shock    Cardiac arrest  Chronic combined systolic and diastolic heart failure          Pulmonology pn 12-14   pn 12-11    Acidosis documented      ABGs / Labs      Vital Signs      Hypotension or Low Blood Pressure documented      Altered Mental Status or Confusion      Diaphoresis, Cold Extremities or Cyanosis      Oliguria     x Medication/Treatment:  -Vasopressors  -Inotropic Drugs  -IV Fluids  -Cardiac Assist Devices  -Hemodynamic Monitoring  -Blood/Blood Products IV Norepinephrine Mar 12-11 to 12-15   x Other: Shock    Shock appears multifactorial in likely secondary to cardiac decompensation and sedative infusions to facilitate mechanical ventilation. Sedative medications now off. Unlikely septic  shock due to appropriate coverage and no new growth in cultures.        Pulmonology pn 12-14     Provider, please specify diagnosis or diagnoses associated with above clinical findings.    [ x  ] Cardiogenic Shock   [   ] Hypovolemic Shock   [   ] Other Shock (please specify):   [   ] Other Condition (please specify):   [   ] Clinically Undetermined         Please document in your progress notes daily for the duration of treatment until resolved and include in your discharge summary.

## 2018-12-18 NOTE — PROGRESS NOTES
Call placed to complete LOCET, spoke to Yamini to do so.  TN advised to fax PASRR and facesheet to [facility] to obtain 142.    1025- PASRR completed    1027- facesheet and PASRR faxed to [facility] to obtain 142 via email.    1132- 142 obtained- printed and uploaded to [facility] [facility] to send to facilities for SNF placement.    1156- call placed to pts sister Chloé to follow up on message left on [place] phone regarding SNF placement.  TN advised that at this point referral will be sent to all facilities in network with pts insurance and will notify both pt and herself when accepting facility information is available. TN advised sister that [facility] one of the 3 facilities they provided as a preference on yesterday has declined acceptance of pt.     1203- referral for SNF placement sent via [facility] to the following SNF facilities:  1. [facility]  [facility]. [facility]  3. [facility]  4. [facility]  5. [facility]  6. [facility]  7. [facility]    TN indicated that the plan is for pt to d/c in the next 1-2 days once accepting facility secured.  TN provided name and contact information for questions or concerns. TN to follow in [facility] for response.    1238-declined by [facility]    1406- [facility]ON[facility] carrera    1445- [facility] declined

## 2018-12-18 NOTE — ASSESSMENT & PLAN NOTE
-L foot wound healing s/p debridement and amputation 12/12/18, recent cardiac arrest - agree with Abx, continued wound care per Wound Care RN recs, offloading  -Wound debrided at bedside today with good bleeding - cont local wound care  -No revascularization indicated at this time

## 2018-12-18 NOTE — PLAN OF CARE
Problem: Noninvasive Ventilation Acute  Goal: Effective Unassisted Ventilation and Oxygenation    Intervention: Monitor and Manage Noninvasive Ventilation   12/15/18 2320   Support Asthma Symptom Control   Airway/Ventilation Management airway patency maintained;calming measures promoted;pulmonary hygiene promoted

## 2018-12-18 NOTE — SUBJECTIVE & OBJECTIVE
"Medications Prior to Admission   Medication Sig Dispense Refill Last Dose    allopurinol (ZYLOPRIM) 100 MG tablet Take 2 tablets (200 mg total) by mouth once daily. 180 tablet 3 12/10/2018    amLODIPine (NORVASC) 5 MG tablet TAKE 1 TABLET(5 MG) BY MOUTH EVERY DAY 30 tablet 0 12/10/2018    aspirin 81 MG Chew Take 81 mg by mouth once daily.   12/10/2018    carvedilol (COREG) 6.25 MG tablet Take 1 tablet (6.25 mg total) by mouth 2 (two) times daily. 60 tablet 11 12/10/2018    coenzyme Q10 100 mg capsule Take 100 mg by mouth every morning.   12/10/2018    collagenase (SANTYL) ointment Apply topically once daily. 14 g 0 12/10/2018    dorzolamide-timolol 2-0.5% (COSOPT) 22.3-6.8 mg/mL ophthalmic solution Place 1 drop into both eyes 3 (three) times daily. 10 mL 12 12/10/2018    ezetimibe (ZETIA) 10 mg tablet Take 1 tablet (10 mg total) by mouth once daily. 30 tablet 2 12/10/2018    fish oil-omega-3 fatty acids 300-1,000 mg capsule Take 2 capsules (2 g total) by mouth 2 (two) times daily. (Patient taking differently: Take 1 g by mouth 2 (two) times daily. ) 120 capsule 5 12/10/2018    insulin glargine-lixisenatide (SOLIQUA 100/33) 100 unit-33 mcg/mL InPn Inject 34 units once daily 5 Syringe 2 12/10/2018    MULTIVIT,THER IRON,CA,FA & MIN (MULTIVITAMIN) Tab Take 1 tablet by mouth every morning.    12/10/2018    oxyCODONE-acetaminophen (PERCOCET) 5-325 mg per tablet Take 1 tablet by mouth every 6 (six) hours as needed for Pain. 30 tablet 0 12/10/2018    pen needle, diabetic 31 gauge x 1/4" Ndle Use as directed 100 each 11 12/10/2018    [DISCONTINUED] benazepril (LOTENSIN) 40 MG tablet TAKE 1 TABLET(40 MG) BY MOUTH TWICE DAILY 60 tablet 0 12/10/2018    [DISCONTINUED] brimonidine 0.1% (ALPHAGAN P) 0.1 % Drop Place 1 drop into both eyes 3 (three) times daily. 15 mL 12 12/10/2018    [DISCONTINUED] furosemide (LASIX) 40 MG tablet Take 1 tablet (40 mg total) by mouth 2 (two) times daily. 60 tablet 0 12/10/2018    " [DISCONTINUED] ketorolac 0.5% (ACULAR) 0.5 % Drop Place 1 drop into the right eye 3 (three) times daily. 1 Bottle 2 12/10/2018       Review of patient's allergies indicates:   Allergen Reactions    Statins-hmg-coa reductase inhibitors      Generalized Pain    Onglyza [saxagliptin]     Penicillins Rash       Past Medical History:   Diagnosis Date    Arthritis     Cellulitis     CKD (chronic kidney disease), stage III     Coronary artery disease     Diabetes mellitus     Diabetic retinopathy     Diabetic ulcer of left foot     Glaucoma     Gout     Hyperlipemia     Hypertension     ICD (implantable cardioverter-defibrillator) in place 11/02/2018    Left chest    Non-pressure chronic ulcer of other part of left foot with fat layer exposed 10/23/2018    PVD (peripheral vascular disease)     Type 2 diabetes mellitus with left diabetic foot ulcer 10/29/2018    Unsteady gait     uses a wheelchair     Past Surgical History:   Procedure Laterality Date    AHMED GLAUCOMA IMPLANT Left 2011    DONE AT [facility]    AMPUTATION, TOE Left 12/5/2018    [facility]formed by [facility] at [facility] OR    AMPUTATION, TOES  2-5 Left 11/16/2018    [facility] at [facility] OR    BAERVELDT GLAUCOMA IMPLANT Left 2012    WITH CATARACT EXTRACTION//DONE AT [facility]    CARDIAC CATHETERIZATION Left 05/2016    CARDIAC DEFIBRILLATOR PLACEMENT Left 11/02/2018    CATARACT EXTRACTION W/  INTRAOCULAR LENS IMPLANT Left 2012    WITH BAERVEDT//DONE AT [facility]    CATARACT EXTRACTION W/  INTRAOCULAR LENS IMPLANT Right 09/26/2018    COMPLEX ()    CB DESTRUCTION WITH CYCLO G6 Left 02/15/2017        CYST REMOVAL      Exam under anesthesia, left foot debridement, washout and all other indicated procedures Left 12/5/2018    Performed by [facility] at [facility] OR    EXAMINATION UNDER ANESTHESIA Left 12/5/2018    Procedure: Exam under anesthesia, left foot debridement, washout and  all other indicated procedures;  Surgeon: Mitch Chan MD;  Location: [facility];  Service: Vascular;  Laterality: Left;  1030AM START  RN PREOP 12/3/2018-----AICD  SEEN BY DR GREER 12/4    HEART CATH-LEFT N/A 5/6/2016    Performed by [facility] at [facility] CATH LAB    INCISION AND DRAINAGE Left 11/14/2018    Procedure: Incision and Drainage, left lower extremity debridement, washout;  Surgeon: Mitch Chan MD;  Location: [facility] OR;  Service: Vascular;  Laterality: Left;    INCISION AND DRAINAGE Left 11/16/2018    Procedure: INCISION AND DRAINAGE;  Surgeon: Mitch Chan MD;  Location: [facility] OR;  Service: Vascular;  Laterality: Left;    INCISION AND DRAINAGE Left 11/16/2018    Performed by Mitch Chan MD at [facility] OR    Incision and Drainage, left lower extremity debridement, washout Left 11/14/2018    Performed by Mitch Chan MD at [facility] OR    INSERTION, ICD GENERATOR, SINGLE CHAMBER N/A 11/2/2018    Performed by Pernell Greer MD at [facility] CATH LAB    INSERTION, IOL PROSTHESIS Right 9/26/2018    Performed by Perla Cort[facility] at [facility] OR [address] [place] [address]    INTRAOCULAR PROSTHESES INSERTION Right 9/26/2018    Procedure: INSERTION, IOL PROSTHESIS;  Surgeon: Perla Cortés MD;  Location: [facility] OR [address] [place] [address];  Service: Ophthalmology;  Laterality: Right;    PHACOEMULSIFICATION OF CATARACT Right 9/26/2018    Procedure: PHACOEMULSIFICATION, CATARACT;  Surgeon: Perla Cortés MD;  Location: [facility] OR [address] [place] [address];  Service: Ophthalmology;  Laterality: Right;    PHACOEMULSIFICATION, CATARACT Right 9/26/2018    Performed by Perla [facility] at [facility] OR [address] [place] [address]    Right foot surgery  10/2014    TOE AMPUTATION Right     first and second    TOE AMPUTATION Left 11/16/2018    Procedure: AMPUTATION, TOES  2-5;  Surgeon: Mitch Chan MD;  Location: [facility] OR;  Service: Vascular;  Laterality: Left;    TOE AMPUTATION Left 12/5/2018    Procedure: AMPUTATION, TOE;   Surgeon: Mitch Chan MD;  Location: [facility] OR;  Service: Vascular;  Laterality: Left;  left great toe    TONSILLECTOMY      TRABECULECTOMY/G 6 LASER Left 2/15/2017    Performed by [facility] at [facility] OR [address] [place] [address]     Family History     Problem Relation (Age of Onset)    Diabetes Mother    Heart disease Brother    No Known Problems Father, Sister, Maternal Aunt, Maternal Uncle, Paternal Aunt, Paternal Uncle, Maternal Grandmother, Maternal Grandfather, Paternal Grandmother, Paternal Grandfather        Tobacco Use    Smoking status: Former Smoker     Packs/day: 1.00     Years: 3.00     Pack years: 3.00     Types: Cigarettes     Last attempt to quit: 1984     Years since quittin.4    Smokeless tobacco: Never Used   Substance and Sexual Activity    Alcohol use: Yes     Alcohol/week: 0.6 oz     Types: 1 Cans of beer per week     Comment: Occasional    Drug use: No    Sexual activity: Not Currently     Review of Systems   Constitutional: Negative for chills and fever.   HENT: Negative for congestion.    Eyes: Negative for visual disturbance.   Respiratory: Negative for shortness of breath.    Cardiovascular: Negative for leg swelling.   Gastrointestinal: Negative for abdominal distention.   Endocrine: Negative for polyuria.   Genitourinary: Negative for dysuria.   Musculoskeletal: Negative for back pain.   Skin: Positive for color change and wound. Negative for pallor and rash.   Allergic/Immunologic: Negative for immunocompromised state.   Neurological: Negative for dizziness.   Hematological: Does not bruise/bleed easily.   Psychiatric/Behavioral: Negative for agitation.     Objective:     Vital Signs (Most Recent):  Temp: 98.5 °F (36.9 °C) (18 1129)  Pulse: 75 (18 1129)  Resp: 18 (18 1129)  BP: (!) 104/58 (18 1129)  SpO2: (!) 94 % (18 1129) Vital Signs (24h Range):  Temp:  [97.7 °F (36.5 °C)-98.5 °F (36.9 °C)] 98.5 °F (36.9 °C)  Pulse:  [75-84] 75  Resp:   [18-19] 18  SpO2:  [94 %-100 %] 94 %  BP: (101-122)/(55-72) 104/58     Weight: 109.3 kg (240 lb 15.4 oz)  Body mass index is 34.57 kg/m².    Physical Exam   Constitutional: He is oriented to person, place, and time. He appears well-developed and well-nourished. No distress.   HENT:   Head: Normocephalic and atraumatic.   Eyes: Conjunctivae are normal.   Neck: Neck supple.   Cardiovascular: Normal rate.   Pulses:       Dorsalis pedis pulses are Detected w/ doppler on the right side, and Detected w/ doppler on the left side.        Posterior tibial pulses are Detected w/ doppler on the right side, and Detected w/ doppler on the left side.   Pulmonary/Chest: Effort normal. No respiratory distress.   Abdominal: Soft. He exhibits no distension and no mass. There is no tenderness. There is no rebound and no guarding.   Musculoskeletal: Normal range of motion. He exhibits no edema, tenderness or deformity.   Feet:   Left Foot:   Skin Integrity: Positive for skin breakdown. Negative for erythema.   Neurological: He is alert and oriented to person, place, and time. A sensory deficit is present.   Skin: Skin is warm and dry. Capillary refill takes 2 to 3 seconds. No rash noted. No erythema. No pallor.        Vitals reviewed.      Significant Labs:  All pertinent labs from the last 24 hours have been reviewed.    Significant Diagnostics:  I have reviewed all pertinent imaging results/findings within the past 24 hours.

## 2018-12-18 NOTE — CONSULTS
[facility] [place]  Consult Note    Consults  Subjective:     Chief Complaint/Reason for Admission: L foot wound    History of Present Illness:             Mitch Chan MD [facility]                         12/18/2018    HPI:  Marvin Ray is a 58 y.o. male with   Patient Active Problem List   Diagnosis    Epiretinal membrane, both eyes    Nuclear sclerotic cataract of right eye    Pseudophakia, left eye    Ptosis, left eyelid    DM type 2 with diabetic peripheral neuropathy    HLD (hyperlipidemia)    Neovascular glaucoma of both eyes    Tophaceous gout    Essential hypertension    CAD (coronary artery disease)    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 edema, with long-term current use of insulin    Neovascular glaucoma of left eye, severe stage    Statin myopathy    Neovascular glaucoma, right eye, mild stage    Left leg cellulitis    PVD (peripheral vascular disease)    Right wrist pain    Multiple open wounds of lower leg    Hepatosplenomegaly    LAYNE (acute kidney injury)    Chronic combined systolic and diastolic heart failure    Non-pressure chronic ulcer of other part of left foot with fat layer exposed    Type 2 diabetes mellitus with left diabetic foot ulcer    Open wound of left foot    Acute renal failure superimposed on stage 3 chronic kidney disease    Cellulitis of left lower extremity    Diabetic foot infection    Cardiac arrest    Acute hypoxemic respiratory failure    Shock    Elevated troponin    Other ascites    Severe malnutrition    Acute renal insufficiency    Goals of care, counseling/discussion   58-year-old white male with the above listed medical history, who underwent left lower extremity endovascular   revascularization on 11/13/2018 including a left SFA, AT and peroneal angioplasty to improve perfusion to his  left foot due to extensive left foot   wound.  On 11/14/2018, he underwent left foot wound debridement and washout.  On11/16/2018, he underwent further debridement, washout of his left foot wound   with second through five left toe amputations including metatarsal heads.  He was continued on antibiotics and [facility] and   with his podiatrist.  He was seen in followup in my clinic on 11/29/2018 with minimal improvement in his wound healing.  Debridement was performed in the clinic, although the patient was not able to tolerate due to pain.  On 12/12/18 he underwent   1.  Left foot wound debridement, 25 x 18 x 1 cm.  2.  Left foot wound washout, 25 x 18 x 1 cm.  3.  Left great toe amputation including metatarsal head.    Cultures were positive and he was started on IV Abx.  He had difficulty obtaining Abx and was sent to the ER 12/10/18.  Received IV Abx, PICC line and the following day he had cardiac arrest s/p ACLS with ROSC.  Recovered in ICU and transferred to floor yesterday.  Has been receiving wound care to L foot.  No F/C.      Past Medical History:   Diagnosis Date    Arthritis     Cellulitis     CKD (chronic kidney disease), stage III     Coronary artery disease     Diabetes mellitus     Diabetic retinopathy     Diabetic ulcer of left foot     Glaucoma     Gout     Hyperlipemia     Hypertension     ICD (implantable cardioverter-defibrillator) in place 11/02/2018    Left chest    Non-pressure chronic ulcer of other part of left foot with fat layer exposed 10/23/2018    PVD (peripheral vascular disease)     Type 2 diabetes mellitus with left diabetic foot ulcer 10/29/2018    Unsteady gait     uses a wheelchair     Past Surgical History:   Procedure Laterality Date    AHMED GLAUCOMA IMPLANT Left 2011    DONE [facility]    AMPUTATION, TOE Left 12/5/2018    Performed by [facility] at [facility] OR    AMPUTATION, TOES  2-5 Left 11/16/2018    Performed by  Mitch Chan MD at [facility] OR    BAERVELDT GLAUCOMA IMPLANT Left 2012    WITH CATARACT EXTRACTION//DONE AT [facility]    CARDIAC CATHETERIZATION Left 05/2016    CARDIAC DEFIBRILLATOR PLACEMENT Left 11/02/2018    CATARACT EXTRACTION W/  INTRAOCULAR LENS IMPLANT Left 2012    WITH BAERVEDT//DONE AT [facility]    CATARACT EXTRACTION W/  INTRAOCULAR LENS IMPLANT Right 09/26/2018    COMPLEX ()    CB DESTRUCTION WITH CYCLO G6 Left 02/15/2017        CYST REMOVAL      Exam under anesthesia, left foot debridement, washout and all other indicated procedures Left 12/5/2018    Performed by Mitch Chan MD at [facility] OR    EXAMINATION UNDER ANESTHESIA Left 12/5/2018    Procedure: Exam under anesthesia, left foot debridement, washout and all other indicated procedures;  Surgeon: Mitch Chan MD;  Location: [facility] OR;  Service: Vascular;  Laterality: Left;  1030AM START  RN PREOP 12/3/2018-----AICD  SEEN BY DR GREER 12/4    HEART CATH-LEFT N/A 5/6/2016    Performed by Mike Magana MD at [facility] CATH LAB    INCISION AND DRAINAGE Left 11/14/2018    Procedure: Incision and Drainage, left lower extremity debridement, washout;  Surgeon: Mitch Chan MD;  Location: [facility] OR;  Service: Vascular;  Laterality: Left;    INCISION AND DRAINAGE Left 11/16/2018    Procedure: INCISION AND DRAINAGE;  Surgeon: Mitch Chan MD;  Location: [facility] OR;  Service: Vascular;  Laterality: Left;    INCISION AND DRAINAGE Left 11/16/2018    Performed by Mitch Chan MD at [facility] OR    Incision and Drainage, left lower extremity debridement, washout Left 11/14/2018    Performed by Mitch Chan MD at [facility] OR    INSERTION, ICD GENERATOR, SINGLE CHAMBER N/A 11/2/2018    Performed by Pernell Greer MD at [facility] CATH LAB    INSERTION, IOL PROSTHESIS Right 9/26/2018    Performed by Perla Cortés MD at [facility] OR 1ST FLR    INTRAOCULAR PROSTHESES INSERTION Right 9/26/2018     Procedure: INSERTION, IOL PROSTHESIS;  Surgeon: Perla Cortés MD;  Location: [facility] OR [address] [place] [address];  Service: Ophthalmology;  Laterality: Right;    PHACOEMULSIFICATION OF CATARACT Right 9/26/2018    Procedure: PHACOEMULSIFICATION, CATARACT;  Surgeon: Perla Cortés MD;  Location: [facility] OR [address] [place] [address];  Service: Ophthalmology;  Laterality: Right;    PHACOEMULSIFICATION, CATARACT Right 9/26/2018    Performed by Perla Cortés MD at [facility] OR [address] [place] [address]    Right foot surgery  10/2014    TOE AMPUTATION Right     first and second    TOE AMPUTATION Left 11/16/2018    Procedure: AMPUTATION, TOES  2-5;  Surgeon: Mitch Chan MD;  Location: [facility] OR;  Service: Vascular;  Laterality: Left;    TOE AMPUTATION Left 12/5/2018    Procedure: AMPUTATION, TOE;  Surgeon: Mitch Chan MD;  Location: [facility] OR;  Service: Vascular;  Laterality: Left;  left great toe    TONSILLECTOMY      TRABECULECTOMY/G 6 LASER Left 2/15/2017    Performed by Perla Cortés MD at [facility] OR [address] [place] [address]     Family History   Problem Relation Age of Onset    Diabetes Mother     Heart disease Brother     No Known Problems Father     No Known Problems Sister     No Known Problems Maternal Aunt     No Known Problems Maternal Uncle     No Known Problems Paternal Aunt     No Known Problems Paternal Uncle     No Known Problems Maternal Grandmother     No Known Problems Maternal Grandfather     No Known Problems Paternal Grandmother     No Known Problems Paternal Grandfather     Anemia Neg Hx     Arrhythmia Neg Hx     Asthma Neg Hx     Clotting disorder Neg Hx     Fainting Neg Hx     Heart attack Neg Hx     Heart failure Neg Hx     Hyperlipidemia Neg Hx     Hypertension Neg Hx     Stroke Neg Hx     Atrial Septal Defect Neg Hx     Amblyopia Neg Hx     Blindness Neg Hx     Glaucoma Neg Hx     Macular degeneration Neg Hx     Retinal detachment Neg Hx     Strabismus Neg Hx      Social History     Socioeconomic  History    Marital status: Legally      Spouse name: Not on file    Number of children: Not on file    Years of education: 14    Highest education level: Not on file   Social Needs    Financial resource strain: Not on file    Food insecurity - worry: Not on file    Food insecurity - inability: Not on file    Transportation needs - medical: Not on file    Transportation needs - non-medical: Not on file   Occupational History    Not on file   Tobacco Use    Smoking status: Former Smoker     Packs/day: 1.00     Years: 3.00     Pack years: 3.00     Types: Cigarettes     Last attempt to quit: 1984     Years since quittin.4    Smokeless tobacco: Never Used   Substance and Sexual Activity    Alcohol use: Yes     Alcohol/week: 0.6 oz     Types: 1 Cans of beer per week     Comment: Occasional    Drug use: No    Sexual activity: Not Currently   Other Topics Concern    Not on file   Social History Narrative    Not on file       Current Facility-Administered Medications:     acetaminophen tablet 650 mg, 650 mg, Oral, Q6H PRN, Wilbert Pate Jr., NP    allopurinol tablet 200 mg, 200 mg, Oral, Daily, Jerardo White MD, 200 mg at 18 1029    aspirin chewable tablet 81 mg, 81 mg, Oral, Daily, Wilbert Pate Jr. NP, 81 mg at 18 0914    brimonidine (ALPHAGAN P) ophthalmic solution 0.1%, 1 drop, Both Eyes, BID, Jazz Sotomayor MD, 1 drop at 18 0918    carvedilol tablet 3.125 mg, 3.125 mg, Oral, BID, Brando Tomlinson MD, Stopped at 18 0900    clopidogrel tablet 75 mg, 75 mg, Oral, Daily, Jazz Sotomayor MD, 75 mg at 18 0915    collagenase ointment, , Topical (Top), Daily, Yesy Madison, NP    dextrose 50% injection 12.5 g, 12.5 g, Intravenous, PRN, Wilbert Pate Jr., NP    dextrose 50% injection 25 g, 25 g, Intravenous, PRN, Wilbert Pate Jr., SCOTT    dorzolamide-timolol 2-0.5% ophthalmic solution 1 drop, 1 drop, Both Eyes, TID, Jazz Sotomayor MD, 1 drop  at 12/18/18 0918    enoxaparin injection 40 mg, 40 mg, Subcutaneous, Daily, Jazz Sotomayor MD, 40 mg at 12/17/18 1623    ezetimibe tablet 10 mg, 10 mg, Oral, Daily, Wilbert Pate Jr., NP, 10 mg at 12/18/18 0914    furosemide tablet 40 mg, 40 mg, Oral, Daily, Brando Tomlinson MD, 40 mg at 12/18/18 0914    glucagon (human recombinant) injection 1 mg, 1 mg, Intramuscular, PRN, Wilbert Pate Jr., NP    glucose chewable tablet 16 g, 16 g, Oral, PRN, Wilbert Pate Jr., NP    glucose chewable tablet 24 g, 24 g, Oral, PRN, Wilbert Pate Jr., NP    insulin aspart U-100 pen 1-10 Units, 1-10 Units, Subcutaneous, QID (AC + HS) PRN, Wilbert Pate Jr., NP, 1 Units at 12/17/18 2146    insulin aspart U-100 pen 3 Units, 3 Units, Subcutaneous, TIDWM, Jazz Sotomayor MD, 3 Units at 12/18/18 0916    insulin detemir U-100 pen 10 Units, 10 Units, Subcutaneous, QHS, Jazz Sotomayor MD, 10 Units at 12/17/18 2145    meropenem (MERREM) 2 g in sodium chloride 0.9% 100 mL IVPB, 2 g, Intravenous, Q8H, Yesy Madison, SCOTT, Last Rate: 100 mL/hr at 12/18/18 1029, 2 g at 12/18/18 1029    miconazole NITRATE 2 % top powder, , Topical (Top), BID, Jazz Sotomayor MD    omega 5-cdt-ooi-fish oil 1,000 mg (120 mg-180 mg) Cap 1 capsule, 1 capsule, Oral, BID, Wilbert Pate Jr., NP, 1 capsule at 12/18/18 0914    ondansetron injection 4 mg, 4 mg, Intravenous, Q6H PRN, Wilbert Pate Jr., SCOTT    oxyCODONE-acetaminophen 5-325 mg per tablet 1 tablet, 1 tablet, Oral, Q6H PRN, Wilbert Pate Jr., NP, 1 tablet at 12/18/18 0915    prednisoLONE acetate 1 % ophthalmic suspension 1 drop, 1 drop, Right Eye, Daily, Jazz Sotomayor MD, 1 drop at 12/18/18 0918    ramelteon tablet 8 mg, 8 mg, Oral, Nightly PRN, Wilbert Pate Jr., NP    senna-docusate 8.6-50 mg per tablet 1 tablet, 1 tablet, Oral, BID, Jazz Sotomayor MD, 1 tablet at 12/18/18 0914    Flushing [facility] Protocol, , , Until Discontinued **AND** sodium chloride 0.9% flush 10 mL, 10 mL,  "Intravenous, Q6H, 10 mL at 12/18/18 0600 **AND** sodium chloride 0.9% flush 10 mL, 10 mL, Intravenous, PRN, Roberto Black MD    [START ON 12/19/2018] vancomycin (VANCOCIN) 1,250 mg in dextrose 5 % 250 mL IVPB, 1,250 mg, Intravenous, Q24H, Kirsten Trinh MD      Medications Prior to Admission   Medication Sig Dispense Refill Last Dose    allopurinol (ZYLOPRIM) 100 MG tablet Take 2 tablets (200 mg total) by mouth once daily. 180 tablet 3 12/10/2018    amLODIPine (NORVASC) 5 MG tablet TAKE 1 TABLET(5 MG) BY MOUTH EVERY DAY 30 tablet 0 12/10/2018    aspirin 81 MG Chew Take 81 mg by mouth once daily.   12/10/2018    carvedilol (COREG) 6.25 MG tablet Take 1 tablet (6.25 mg total) by mouth 2 (two) times daily. 60 tablet 11 12/10/2018    coenzyme Q10 100 mg capsule Take 100 mg by mouth every morning.   12/10/2018    collagenase (SANTYL) ointment Apply topically once daily. 14 g 0 12/10/2018    dorzolamide-timolol 2-0.5% (COSOPT) 22.3-6.8 mg/mL ophthalmic solution Place 1 drop into both eyes 3 (three) times daily. 10 mL 12 12/10/2018    ezetimibe (ZETIA) 10 mg tablet Take 1 tablet (10 mg total) by mouth once daily. 30 tablet 2 12/10/2018    fish oil-omega-3 fatty acids 300-1,000 mg capsule Take 2 capsules (2 g total) by mouth 2 (two) times daily. (Patient taking differently: Take 1 g by mouth 2 (two) times daily. ) 120 capsule 5 12/10/2018    insulin glargine-lixisenatide (SOLIQUA 100/33) 100 unit-33 mcg/mL InPn Inject 34 units once daily 5 Syringe 2 12/10/2018    MULTIVIT,THER IRON,CA,FA & MIN (MULTIVITAMIN) Tab Take 1 tablet by mouth every morning.    12/10/2018    oxyCODONE-acetaminophen (PERCOCET) 5-325 mg per tablet Take 1 tablet by mouth every 6 (six) hours as needed for Pain. 30 tablet 0 12/10/2018    pen needle, diabetic 31 gauge x 1/4" Ndle Use as directed 100 each 11 12/10/2018    [DISCONTINUED] benazepril (LOTENSIN) 40 MG tablet TAKE 1 TABLET(40 MG) BY MOUTH TWICE DAILY 60 tablet 0 " 12/10/2018    [DISCONTINUED] brimonidine 0.1% (ALPHAGAN P) 0.1 % Drop Place 1 drop into both eyes 3 (three) times daily. 15 mL 12 12/10/2018    [DISCONTINUED] furosemide (LASIX) 40 MG tablet Take 1 tablet (40 mg total) by mouth 2 (two) times daily. 60 tablet 0 12/10/2018    [DISCONTINUED] ketorolac 0.5% (ACULAR) 0.5 % Drop Place 1 drop into the right eye 3 (three) times daily. 1 Bottle 2 12/10/2018       Review of patient's allergies indicates:   Allergen Reactions    Statins-hmg-coa reductase inhibitors      Generalized Pain    Onglyza [saxagliptin]     Penicillins Rash       Past Medical History:   Diagnosis Date    Arthritis     Cellulitis     CKD (chronic kidney disease), stage III     Coronary artery disease     Diabetes mellitus     Diabetic retinopathy     Diabetic ulcer of left foot     Glaucoma     Gout     Hyperlipemia     Hypertension     ICD (implantable cardioverter-defibrillator) in place 11/02/2018    Left chest    Non-pressure chronic ulcer of other part of left foot with fat layer exposed 10/23/2018    PVD (peripheral vascular disease)     Type 2 diabetes mellitus with left diabetic foot ulcer 10/29/2018    Unsteady gait     uses a wheelchair     Past Surgical History:   Procedure Laterality Date    AHMED GLAUCOMA IMPLANT Left 2011    DONE AT [facility]    AMPUTATION, TOE Left 12/5/2018    [facility] Mitch [facility] at [facility] OR    AMPUTATION, TOES  2-5 Left 11/16/2018    Performed by Mitch Chan [facility] at [facility] OR    BAERVELDT GLAUCOMA IMPLANT Left 2012    WITH CATARACT EXTRACTION//DONE AT [facility]    CARDIAC CATHETERIZATION Left 05/2016    CARDIAC DEFIBRILLATOR PLACEMENT Left 11/02/2018    CATARACT EXTRACTION W/  INTRAOCULAR LENS IMPLANT Left 2012    WITH BAERVEDT//DONE AT [facility]    CATARACT EXTRACTION W/  INTRAOCULAR LENS IMPLANT Right 09/26/2018    COMPLEX ()    CB DESTRUCTION WITH CYCLO G6 Left 02/15/2017        CYST REMOVAL       Exam under anesthesia, left foot debridement, washout and all other indicated procedures Left 12/5/2018    Performed by Mitch Chan MD at [facility] OR    EXAMINATION UNDER ANESTHESIA Left 12/5/2018    Procedure: Exam under anesthesia, left foot debridement, washout and all other indicated procedures;  Surgeon: Mitch Chan MD;  Location: [facility] OR;  Service: Vascular;  Laterality: Left;  1030AM START  RN PREOP 12/3/2018-----AICD  SEEN BY DR GREER 12/4    HEART CATH-LEFT N/A 5/6/2016    Performed by Mike Magana MD at [facility] CATH LAB    INCISION AND DRAINAGE Left 11/14/2018    Procedure: Incision and Drainage, left lower extremity debridement, washout;  Surgeon: Mitch Chan MD;  Location: [facility] OR;  Service: Vascular;  Laterality: Left;    INCISION AND DRAINAGE Left 11/16/2018    Procedure: INCISION AND DRAINAGE;  Surgeon: Mitch Chan MD;  Location: [facility] OR;  Service: Vascular;  Laterality: Left;    INCISION AND DRAINAGE Left 11/16/2018    Performed by Mitch Chan MD at [facility] OR    Incision and Drainage, left lower extremity debridement, washout Left 11/14/2018    Performed by Mitch Chan MD at [facility] OR    INSERTION, ICD GENERATOR, SINGLE CHAMBER N/A 11/2/2018    Performed by Pernell Greer MD at [facility] CATH LAB    INSERTION, IOL PROSTHESIS Right 9/26/2018    Performed by Perla Cortés MD at [facility] OR [address] [place] [address]    INTRAOCULAR PROSTHESES INSERTION Right 9/26/2018    Procedure: INSERTION, IOL PROSTHESIS;  Surgeon: Perla Cortés MD;  Location: [facility] OR [address] [place] [address];  Service: Ophthalmology;  Laterality: Right;    PHACOEMULSIFICATION OF CATARACT Right 9/26/2018    Procedure: PHACOEMULSIFICATION, CATARACT;  Surgeon: Perla Cortés MD;  Location: [facility] OR [address] [place] [address];  Service: Ophthalmology;  Laterality: Right;    PHACOEMULSIFICATION, CATARACT Right 9/26/2018    Performed by Perla Cortés MD at [facility] OR [address] [place] [address]    Right foot surgery  10/2014    TOE  AMPUTATION Right     first and second    TOE AMPUTATION Left 2018    Procedure: AMPUTATION, TOES  2-5;  Surgeon: Mitch Chan MD;  Location: [facility] OR;  Service: Vascular;  Laterality: Left;    TOE AMPUTATION Left 2018    Procedure: AMPUTATION, TOE;  Surgeon: Mitch Chan MD;  Location: [facility] OR;  Service: Vascular;  Laterality: Left;  left great toe    TONSILLECTOMY      TRABECULECTOMY/G 6 LASER Left 2/15/2017    Performed by [facility] at [facility] OR [address] [place] [address]     Family History     Problem Relation (Age of Onset)    Diabetes Mother    Heart disease Brother    No Known Problems Father, Sister, Maternal Aunt, Maternal Uncle, Paternal Aunt, Paternal Uncle, Maternal Grandmother, Maternal Grandfather, Paternal Grandmother, Paternal Grandfather        Tobacco Use    Smoking status: Former Smoker     Packs/day: 1.00     Years: 3.00     Pack years: 3.00     Types: Cigarettes     Last attempt to quit: 1984     Years since quittin.4    Smokeless tobacco: Never Used   Substance and Sexual Activity    Alcohol use: Yes     Alcohol/week: 0.6 oz     Types: 1 Cans of beer per week     Comment: Occasional    Drug use: No    Sexual activity: Not Currently     Review of Systems   Constitutional: Negative for chills and fever.   HENT: Negative for congestion.    Eyes: Negative for visual disturbance.   Respiratory: Negative for shortness of breath.    Cardiovascular: Negative for leg swelling.   Gastrointestinal: Negative for abdominal distention.   Endocrine: Negative for polyuria.   Genitourinary: Negative for dysuria.   Musculoskeletal: Negative for back pain.   Skin: Positive for color change and wound. Negative for pallor and rash.   Allergic/Immunologic: Negative for immunocompromised state.   Neurological: Negative for dizziness.   Hematological: Does not bruise/bleed easily.   Psychiatric/Behavioral: Negative for agitation.     Objective:     Vital Signs (Most Recent):  Temp:  98.5 °F (36.9 °C) (12/18/18 1129)  Pulse: 75 (12/18/18 1129)  Resp: 18 (12/18/18 1129)  BP: (!) 104/58 (12/18/18 1129)  SpO2: (!) 94 % (12/18/18 1129) Vital Signs (24h Range):  Temp:  [97.7 °F (36.5 °C)-98.5 °F (36.9 °C)] 98.5 °F (36.9 °C)  Pulse:  [75-84] 75  Resp:  [18-19] 18  SpO2:  [94 %-100 %] 94 %  BP: (101-122)/(55-72) 104/58     Weight: 109.3 kg (240 lb 15.4 oz)  Body mass index is 34.57 kg/m².    Physical Exam   Constitutional: He is oriented to person, place, and time. He appears well-developed and well-nourished. No distress.   HENT:   Head: Normocephalic and atraumatic.   Eyes: Conjunctivae are normal.   Neck: Neck supple.   Cardiovascular: Normal rate.   Pulses:       Dorsalis pedis pulses are Detected w/ doppler on the right side, and Detected w/ doppler on the left side.        Posterior tibial pulses are Detected w/ doppler on the right side, and Detected w/ doppler on the left side.   Pulmonary/Chest: Effort normal. No respiratory distress.   Abdominal: Soft. He exhibits no distension and no mass. There is no tenderness. There is no rebound and no guarding.   Musculoskeletal: Normal range of motion. He exhibits no edema, tenderness or deformity.   Feet:   Left Foot:   Skin Integrity: Positive for skin breakdown. Negative for erythema.   Neurological: He is alert and oriented to person, place, and time. A sensory deficit is present.   Skin: Skin is warm and dry. Capillary refill takes 2 to 3 seconds. No rash noted. No erythema. No pallor.        Vitals reviewed.      Significant Labs:  All pertinent labs from the last 24 hours have been reviewed.    Significant Diagnostics:  I have reviewed all pertinent imaging results/findings within the past 24 hours.    Assessment/Plan:     Open wound of left foot    -L foot wound healing s/p debridement and amputation 12/12/18, recent cardiac arrest - agree with Abx, continued wound care per Wound Care RN recs, offloading  -Wound debrided at bedside today with  good bleeding - cont local wound care  -No revascularization indicated at this time         Thank you for your consult. I will follow-up with patient. Please contact us if you have any additional questions.    Mitch Chan MD  Vascular Surgery  [facility]

## 2018-12-18 NOTE — PT/OT/SLP PROGRESS
Physical Therapy Treatment    Patient Name:  Marvin Ray   MRN:  [id number]    Recommendations:     Discharge Recommendations:  nursing facility, skilled   Discharge Equipment Recommendations: none   Barriers to discharge: Decreased caregiver support    Assessment:     Marvin Ray is a 58 y.o. male admitted with a medical diagnosis of Cardiac arrest.  He presents with the following impairments/functional limitations:  weakness, impaired functional mobilty, decreased safety awareness, impaired coordination, impaired endurance, gait instability, impaired balance, impaired skin, decreased upper extremity function, impaired self care skills, decreased lower extremity function, orthopedic precautions, edema pt progressing toward PT goals.    Rehab Prognosis:  Good; patient would benefit from acute skilled PT services to address these deficits and reach maximum level of function.      Recent Surgery: * No surgery found *      Plan:     During this hospitalization, patient to be seen daily to address the above listed problems via therapeutic activities, therapeutic exercises, wheelchair management/training  · Plan of Care Expires:  12/29/18   Plan of Care Reviewed with: patient, sibling    Subjective     Communicated with nsg prior to session.  Patient found HOB elevated upon PT entry to room, agreeable to treatment.      Chief Complaint: tired, didn't get any sleep last night  Patient comments/goals: Pt still wants to go home  Pain/Comfort:  · Pain Rating 1: 0/10    Patients cultural, spiritual, Yazidi conflicts given the current situation:    No  Objective:     Patient found with: PICC line, bed alarm     General Precautions: Standard, fall   Orthopedic Precautions:LLE non weight bearing   Braces: (Darco)     Functional Mobility:  · Bed Mobility:     · Scooting: supervision  · Supine to Sit: supervision  · Transfers:     · Bed to Chair: minimum assistance with  slide board  using  Slide Board  · Gait:  N/T  · Balance: Fair+ sit      AM-PAC 6 CLICK MOBILITY  Turning over in bed (including adjusting bedclothes, sheets and blankets)?: 3  Sitting down on and standing up from a chair with arms (e.g., wheelchair, bedside commode, etc.): 2  Moving from lying on back to sitting on the side of the bed?: 3  Moving to and from a bed to a chair (including a wheelchair)?: 3  Need to walk in hospital room?: 1  Climbing 3-5 steps with a railing?: 1  Basic Mobility Total Score: 13       Therapeutic Activities and Exercises:   Pt performed B AP's, HS's, TKE's x 10 reps In bed then B FAQ's and marching sitting at EOB x 10 reps.  Pt propelled W/C with B UE's and L LE 75' and 125' with SBA.  Pt Mod Indep with brake management.  Pt performed W/C press-ups with max A x 10 reps    Patient left up in chair with call button in reach, nsg notified and CG present..    GOALS:   Multidisciplinary Problems     Physical Therapy Goals        Problem: Physical Therapy Goal    Goal Priority Disciplines Outcome Goal Variances Interventions   Physical Therapy Goal     PT, PT/OT Ongoing (interventions implemented as appropriate)     Description:  Goals to be met by: 2018     Patient will increase functional independence with mobility by performin. Supine to sit with Modified [place]  2. Sit to stand transfer with Minimal Assistance  3. Gait to e assessed.   4. Lower extremity exercise program x10 reps per handout, with supervision  5.  Bed to chair t/f with Min A  6.  W/C propulsion x 150' with Supervision                      Time Tracking:     PT Received On: 18  PT Start Time: 1350     PT Stop Time: 1415  PT Total Time (min): 25 min     Billable Minutes: Therapeutic Exercise 15 and Train/Wheelchair Management 10    Treatment Type: Treatment  PT/PTA: PT     PTA Visit Number: 0     Consuelo Foote, PT  2018

## 2018-12-18 NOTE — NURSING
Report received from JILLIAN Barr. Patient awake and alert. No acute distress noted. Safety maintained. Family at bedside. Will continue to monitor.

## 2018-12-18 NOTE — HPI
Mitch Chan MD [facility]                         12/18/2018    HPI:  Marvin Ray is a 58 y.o. male with   Patient Active Problem List   Diagnosis    Epiretinal membrane, both eyes    Nuclear sclerotic cataract of right eye    Pseudophakia, left eye    Ptosis, left eyelid    DM type 2 with diabetic peripheral neuropathy    HLD (hyperlipidemia)    Neovascular glaucoma of both eyes    Tophaceous gout    Essential hypertension    CAD (coronary artery disease)    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 edema, with long-term current use of insulin    Neovascular glaucoma of left eye, severe stage    Statin myopathy    Neovascular glaucoma, right eye, mild stage    Left leg cellulitis    PVD (peripheral vascular disease)    Right wrist pain    Multiple open wounds of lower leg    Hepatosplenomegaly    LAYNE (acute kidney injury)    Chronic combined systolic and diastolic heart failure    Non-pressure chronic ulcer of other part of left foot with fat layer exposed    Type 2 diabetes mellitus with left diabetic foot ulcer    Open wound of left foot    Acute renal failure superimposed on stage 3 chronic kidney disease    Cellulitis of left lower extremity    Diabetic foot infection    Cardiac arrest    Acute hypoxemic respiratory failure    Shock    Elevated troponin    Other ascites    Severe malnutrition    Acute renal insufficiency    Goals of care, counseling/discussion   58-year-old white male with the above listed medical history, who underwent left lower extremity endovascular   revascularization on 11/13/2018 including a left SFA, AT and peroneal angioplasty to improve perfusion to his left foot due to extensive left foot   wound.  On 11/14/2018, he underwent left foot wound debridement and washout.  On11/16/2018, he underwent further debridement, washout  of his left foot wound   with second through five left toe amputations including metatarsal heads.  He was continued on antibiotics and [facility] and   with his podiatrist.  He was seen in followup in my clinic on 11/29/2018 with minimal improvement in his wound healing.  Debridement was performed in the clinic, although the patient was not able to tolerate due to pain.  On 12/12/18 he underwent   1.  Left foot wound debridement, 25 x 18 x 1 cm.  2.  Left foot wound washout, 25 x 18 x 1 cm.  3.  Left great toe amputation including metatarsal head.    Cultures were positive and he was started on IV Abx.  He had difficulty obtaining Abx and was sent to the ER 12/10/18.  Received IV Abx, PICC line and the following day he had cardiac arrest s/p ACLS with ROSC.  Recovered in ICU and transferred to floor yesterday.  Has been receiving wound care to L foot.  No F/C.      Past Medical History:   Diagnosis Date    Arthritis     Cellulitis     CKD (chronic kidney disease), stage III     Coronary artery disease     Diabetes mellitus     Diabetic retinopathy     Diabetic ulcer of left foot     Glaucoma     Gout     Hyperlipemia     Hypertension     ICD (implantable cardioverter-defibrillator) in place 11/02/2018    Left chest    Non-pressure chronic ulcer of other part of left foot with fat layer exposed 10/23/2018    PVD (peripheral vascular disease)     Type 2 diabetes mellitus with left diabetic foot ulcer 10/29/2018    Unsteady gait     uses a wheelchair     Past Surgical History:   Procedure Laterality Date    AHMED GLAUCOMA IMPLANT Left 2011    DONE AT [facility]    AMPUTATION, TOE Left 12/5/2018    [facility]    AMPUTATION, TOES  2-5 Left 11/16/2018    [facility] OR    BAERVELDT GLAUCOMA IMPLANT Left 2012    WITH CATARACT EXTRACTION//DONE AT [facility]    CARDIAC CATHETERIZATION Left 05/2016    CARDIAC  DEFIBRILLATOR PLACEMENT Left 11/02/2018    CATARACT EXTRACTION W/  INTRAOCULAR LENS IMPLANT Left 2012    WITH BAERVEDT//DONE AT [facility]    CATARACT EXTRACTION W/  INTRAOCULAR LENS IMPLANT Right 09/26/2018    COMPLEX ()    CB DESTRUCTION WITH CYCLO G6 Left 02/15/2017        CYST REMOVAL      Exam under anesthesia, left foot debridement, washout and all other indicated procedures Left 12/5/2018    Performed by [facility] at [facility] OR    EXAMINATION UNDER ANESTHESIA Left 12/5/2018    Procedure: Exam under anesthesia, left foot debridement, washout and all other indicated procedures;  Surgeon: Mitch Chan MD;  Location: [facility] OR;  Service: Vascular;  Laterality: Left;  1030AM START  RN PREOP 12/3/2018-----AICD  SEEN BY DR GREER 12/4    HEART CATH-LEFT N/A 5/6/2016    Performed by [facility]ana [facility] at [facility] CATH LAB    INCISION AND DRAINAGE Left 11/14/2018    Procedure: Incision and Drainage, left lower extremity debridement, washout;  Surgeon: Mitch Chan MD;  Location: [facility] OR;  Service: Vascular;  Laterality: Left;    INCISION AND DRAINAGE Left 11/16/2018    Procedure: INCISION AND DRAINAGE;  Surgeon: Mitch Chan MD;  Location: [facility] OR;  Service: Vascular;  Laterality: Left;    INCISION AND DRAINAGE Left 11/16/2018    Performed by Mitch Chan MD at [facility] OR    Incision and Drainage, left lower extremity debridement, washout Left 11/14/2018    Performed by Mitch Chan MD at [facility] OR    INSERTION, ICD GENERATOR, SINGLE CHAMBER N/A 11/2/2018    Performed by Pernell Greer MD at [facility] CATH LAB    INSERTION, IOL PROSTHESIS Right 9/26/2018    Performed by Perla Cortés MD at [facility] OR 1ST FLR    INTRAOCULAR PROSTHESES INSERTION Right 9/26/2018    Procedure: INSERTION, IOL PROSTHESIS;  Surgeon: Perla Cortés MD;  Location: [facility] OR 1ST FLR;  Service: Ophthalmology;  Laterality: Right;    PHACOEMULSIFICATION OF CATARACT  Right 9/26/2018    Procedure: PHACOEMULSIFICATION, CATARACT;  Surgeon: Perla Cortés MD;  Location: [facility] OR [address] [place] [address];  Service: Ophthalmology;  Laterality: Right;    PHACOEMULSIFICATION, CATARACT Right 9/26/2018    Performed by Perla Cortés MD at [facility] OR [address] [place] [address]    Right foot surgery  10/2014    TOE AMPUTATION Right     first and second    TOE AMPUTATION Left 11/16/2018    Procedure: AMPUTATION, TOES  2-5;  Surgeon: Mitch Chan MD;  Location: [facility] OR;  Service: Vascular;  Laterality: Left;    TOE AMPUTATION Left 12/5/2018    Procedure: AMPUTATION, TOE;  Surgeon: Mitch Chan MD;  Location: [facility] OR;  Service: Vascular;  Laterality: Left;  left great toe    TONSILLECTOMY      TRABECULECTOMY/G 6 LASER Left 2/15/2017    Performed by Perla Cortés MD at [facility] OR [address] [place] [address]     Family History   Problem Relation Age of Onset    Diabetes Mother     Heart disease Brother     No Known Problems Father     No Known Problems Sister     No Known Problems Maternal Aunt     No Known Problems Maternal Uncle     No Known Problems Paternal Aunt     No Known Problems Paternal Uncle     No Known Problems Maternal Grandmother     No Known Problems Maternal Grandfather     No Known Problems Paternal Grandmother     No Known Problems Paternal Grandfather     Anemia Neg Hx     Arrhythmia Neg Hx     Asthma Neg Hx     Clotting disorder Neg Hx     Fainting Neg Hx     Heart attack Neg Hx     Heart failure Neg Hx     Hyperlipidemia Neg Hx     Hypertension Neg Hx     Stroke Neg Hx     Atrial Septal Defect Neg Hx     Amblyopia Neg Hx     Blindness Neg Hx     Glaucoma Neg Hx     Macular degeneration Neg Hx     Retinal detachment Neg Hx     Strabismus Neg Hx      Social History     Socioeconomic History    Marital status: Legally      Spouse name: Not on file    Number of children: Not on file    Years of education: 14    Highest education level: Not on file   Social  Needs    Financial resource strain: Not on file    Food insecurity - worry: Not on file    Food insecurity - inability: Not on file    Transportation needs - medical: Not on file    Transportation needs - non-medical: Not on file   Occupational History    Not on file   Tobacco Use    Smoking status: Former Smoker     Packs/day: 1.00     Years: 3.00     Pack years: 3.00     Types: Cigarettes     Last attempt to quit: 1984     Years since quittin.4    Smokeless tobacco: Never Used   Substance and Sexual Activity    Alcohol use: Yes     Alcohol/week: 0.6 oz     Types: 1 Cans of beer per week     Comment: Occasional    Drug use: No    Sexual activity: Not Currently   Other Topics Concern    Not on file   Social History Narrative    Not on file       Current Facility-Administered Medications:     acetaminophen tablet 650 mg, 650 mg, Oral, Q6H PRN, Wilbert Pate Jr., NP    allopurinol tablet 200 mg, 200 mg, Oral, Daily, Jerardo White MD, 200 mg at 18 1029    aspirin chewable tablet 81 mg, 81 mg, Oral, Daily, Wilbert Pate Jr., NP, 81 mg at 18 0914    brimonidine (ALPHAGAN P) ophthalmic solution 0.1%, 1 drop, Both Eyes, BID, Jazz Sotomayor MD, 1 drop at 18 0918    carvedilol tablet 3.125 mg, 3.125 mg, Oral, BID, Brando Tomlinson MD, Stopped at 18 0900    clopidogrel tablet 75 mg, 75 mg, Oral, Daily, Jazz Sotomayor MD, 75 mg at 18 0915    collagenase ointment, , Topical (Top), Daily, Yesy Madison, NP    dextrose 50% injection 12.5 g, 12.5 g, Intravenous, PRN, Wilbert Pate Jr., NP    dextrose 50% injection 25 g, 25 g, Intravenous, PRN, Wilbert Pate Jr., SCOTT    dorzolamide-timolol 2-0.5% ophthalmic solution 1 drop, 1 drop, Both Eyes, TID, Jazz Sotomayor MD, 1 drop at 18 0918    enoxaparin injection 40 mg, 40 mg, Subcutaneous, Daily, Jazz Sotomayor MD, 40 mg at 18 1623    ezetimibe tablet 10 mg, 10 mg, Oral, Daily, Wilbert Pate Jr.,  NP, 10 mg at 12/18/18 0914    furosemide tablet 40 mg, 40 mg, Oral, Daily, Brando Tomlinson MD, 40 mg at 12/18/18 0914    glucagon (human recombinant) injection 1 mg, 1 mg, Intramuscular, PRN, Wilbert Pate Jr., NP    glucose chewable tablet 16 g, 16 g, Oral, PRN, Wilbert Pate Jr., NP    glucose chewable tablet 24 g, 24 g, Oral, PRN, Wilbert Pate Jr., NP    insulin aspart U-100 pen 1-10 Units, 1-10 Units, Subcutaneous, QID (AC + HS) PRN, Wilbert Pate Jr., NP, 1 Units at 12/17/18 2146    insulin aspart U-100 pen 3 Units, 3 Units, Subcutaneous, TIDWM, Jazz Sotomayor MD, 3 Units at 12/18/18 0916    insulin detemir U-100 pen 10 Units, 10 Units, Subcutaneous, QHS, Jazz Sotomayor MD, 10 Units at 12/17/18 2145    meropenem (MERREM) 2 g in sodium chloride 0.9% 100 mL IVPB, 2 g, Intravenous, Q8H, Yesy Madison, SCOTT, Last Rate: 100 mL/hr at 12/18/18 1029, 2 g at 12/18/18 1029    miconazole NITRATE 2 % top powder, , Topical (Top), BID, Jazz Sotomayor MD    omega 5-uyc-jdy-fish oil 1,000 mg (120 mg-180 mg) Cap 1 capsule, 1 capsule, Oral, BID, Wilbert Pate Jr., NP, 1 capsule at 12/18/18 0914    ondansetron injection 4 mg, 4 mg, Intravenous, Q6H PRN, Wilbert Pate Jr., SCOTT    oxyCODONE-acetaminophen 5-325 mg per tablet 1 tablet, 1 tablet, Oral, Q6H PRN, Wilbert Pate Jr., NP, 1 tablet at 12/18/18 0915    prednisoLONE acetate 1 % ophthalmic suspension 1 drop, 1 drop, Right Eye, Daily, Jazz Sotomayor MD, 1 drop at 12/18/18 0918    ramelteon tablet 8 mg, 8 mg, Oral, Nightly PRN, Wilbert Pate Jr., NP    senna-docusate 8.6-50 mg per tablet 1 tablet, 1 tablet, Oral, BID, Jazz Sotomayor MD, 1 tablet at 12/18/18 0914    Flushing PICC Protocol, , , Until Discontinued **AND** sodium chloride 0.9% flush 10 mL, 10 mL, Intravenous, Q6H, 10 mL at 12/18/18 0600 **AND** sodium chloride 0.9% flush 10 mL, 10 mL, Intravenous, PRN, Roberto Black MD    [START ON 12/19/2018] vancomycin (VANCOCIN) 1,250 mg  in dextrose 5 % 250 mL IVPB, 1,250 mg, Intravenous, Q24H, Kirsten Trinh MD

## 2018-12-19 PROBLEM — R79.89 ELEVATED TROPONIN: Status: RESOLVED | Noted: 2018-12-12 | Resolved: 2018-12-19

## 2018-12-19 PROBLEM — N28.9 ACUTE RENAL INSUFFICIENCY: Status: RESOLVED | Noted: 2018-12-13 | Resolved: 2018-12-19

## 2018-12-19 LAB
ANION GAP SERPL CALC-SCNC: 7 MMOL/L
BASOPHILS # BLD AUTO: 0.05 K/UL
BASOPHILS NFR BLD: 0.7 %
BUN SERPL-MCNC: 38 MG/DL
CALCIUM SERPL-MCNC: 8.3 MG/DL
CHLORIDE SERPL-SCNC: 101 MMOL/L
CO2 SERPL-SCNC: 29 MMOL/L
CREAT SERPL-MCNC: 0.8 MG/DL
DIFFERENTIAL METHOD: ABNORMAL
EOSINOPHIL # BLD AUTO: 0.2 K/UL
EOSINOPHIL NFR BLD: 2.5 %
ERYTHROCYTE [DISTWIDTH] IN BLOOD BY AUTOMATED COUNT: 15.8 %
EST. GFR  (AFRICAN AMERICAN): >60 ML/MIN/1.73 M^2
EST. GFR  (NON AFRICAN AMERICAN): >60 ML/MIN/1.73 M^2
GLUCOSE SERPL-MCNC: 141 MG/DL
HCT VFR BLD AUTO: 27.2 %
HGB BLD-MCNC: 8.7 G/DL
LYMPHOCYTES # BLD AUTO: 0.7 K/UL
LYMPHOCYTES NFR BLD: 9.3 %
MCH RBC QN AUTO: 30.3 PG
MCHC RBC AUTO-ENTMCNC: 32 G/DL
MCV RBC AUTO: 95 FL
MONOCYTES # BLD AUTO: 0.9 K/UL
MONOCYTES NFR BLD: 12.5 %
NEUTROPHILS # BLD AUTO: 5.6 K/UL
NEUTROPHILS NFR BLD: 75 %
PLATELET # BLD AUTO: 320 K/UL
PMV BLD AUTO: 9.2 FL
POCT GLUCOSE: 182 MG/DL (ref 70–110)
POCT GLUCOSE: 183 MG/DL (ref 70–110)
POCT GLUCOSE: 190 MG/DL (ref 70–110)
POCT GLUCOSE: 191 MG/DL (ref 70–110)
POTASSIUM SERPL-SCNC: 4.5 MMOL/L
RBC # BLD AUTO: 2.87 M/UL
SODIUM SERPL-SCNC: 137 MMOL/L
VANCOMYCIN SERPL-MCNC: 15.7 UG/ML
WBC # BLD AUTO: 7.51 K/UL

## 2018-12-19 PROCEDURE — 86580 TB INTRADERMAL TEST: CPT | Performed by: INTERNAL MEDICINE

## 2018-12-19 PROCEDURE — 63600175 PHARM REV CODE 636 W HCPCS: Performed by: NURSE PRACTITIONER

## 2018-12-19 PROCEDURE — 25000003 PHARM REV CODE 250: Performed by: EMERGENCY MEDICINE

## 2018-12-19 PROCEDURE — G8987 SELF CARE CURRENT STATUS: HCPCS | Mod: CK

## 2018-12-19 PROCEDURE — 25000003 PHARM REV CODE 250: Performed by: HOSPITALIST

## 2018-12-19 PROCEDURE — 85025 COMPLETE CBC W/AUTO DIFF WBC: CPT

## 2018-12-19 PROCEDURE — 25000003 PHARM REV CODE 250: Performed by: INTERNAL MEDICINE

## 2018-12-19 PROCEDURE — 97535 SELF CARE MNGMENT TRAINING: CPT

## 2018-12-19 PROCEDURE — 99233 SBSQ HOSP IP/OBS HIGH 50: CPT | Mod: ,,, | Performed by: INTERNAL MEDICINE

## 2018-12-19 PROCEDURE — 97110 THERAPEUTIC EXERCISES: CPT

## 2018-12-19 PROCEDURE — A4216 STERILE WATER/SALINE, 10 ML: HCPCS | Performed by: INTERNAL MEDICINE

## 2018-12-19 PROCEDURE — 63600175 PHARM REV CODE 636 W HCPCS: Performed by: INTERNAL MEDICINE

## 2018-12-19 PROCEDURE — 36415 COLL VENOUS BLD VENIPUNCTURE: CPT

## 2018-12-19 PROCEDURE — 99024 POSTOP FOLLOW-UP VISIT: CPT | Mod: ,,, | Performed by: SURGERY

## 2018-12-19 PROCEDURE — G8980 MOBILITY D/C STATUS: HCPCS | Mod: CL

## 2018-12-19 PROCEDURE — 21400001 HC TELEMETRY ROOM

## 2018-12-19 PROCEDURE — 11044 DBRDMT BONE 1ST 20 SQ CM/<: CPT | Mod: 78,,, | Performed by: SURGERY

## 2018-12-19 PROCEDURE — 97542 WHEELCHAIR MNGMENT TRAINING: CPT

## 2018-12-19 PROCEDURE — G8988 SELF CARE GOAL STATUS: HCPCS | Mod: CI

## 2018-12-19 PROCEDURE — G8989 SELF CARE D/C STATUS: HCPCS | Mod: CK

## 2018-12-19 PROCEDURE — G8979 MOBILITY GOAL STATUS: HCPCS | Mod: CK

## 2018-12-19 PROCEDURE — 80202 ASSAY OF VANCOMYCIN: CPT

## 2018-12-19 PROCEDURE — 63600175 PHARM REV CODE 636 W HCPCS: Performed by: HOSPITALIST

## 2018-12-19 PROCEDURE — 25000003 PHARM REV CODE 250: Performed by: NURSE PRACTITIONER

## 2018-12-19 PROCEDURE — 80048 BASIC METABOLIC PNL TOTAL CA: CPT

## 2018-12-19 PROCEDURE — 11047 DBRDMT BONE EACH ADDL: CPT | Mod: ,,, | Performed by: SURGERY

## 2018-12-19 PROCEDURE — G8978 MOBILITY CURRENT STATUS: HCPCS | Mod: CL

## 2018-12-19 RX ORDER — AMOXICILLIN 250 MG
1 CAPSULE ORAL 2 TIMES DAILY
Status: ON HOLD | COMMUNITY
Start: 2018-12-19 | End: 2019-03-11

## 2018-12-19 RX ORDER — ALLOPURINOL 100 MG/1
300 TABLET ORAL DAILY
Status: DISCONTINUED | OUTPATIENT
Start: 2018-12-20 | End: 2018-12-20 | Stop reason: HOSPADM

## 2018-12-19 RX ORDER — CLOPIDOGREL BISULFATE 75 MG/1
75 TABLET ORAL DAILY
Qty: 30 TABLET | Refills: 11
Start: 2018-12-20 | End: 2019-02-06 | Stop reason: CLARIF

## 2018-12-19 RX ORDER — PREDNISOLONE ACETATE 10 MG/ML
1 SUSPENSION/ DROPS OPHTHALMIC DAILY
Qty: 5 ML | Refills: 0
Start: 2018-12-20 | End: 2018-12-30

## 2018-12-19 RX ORDER — FUROSEMIDE 40 MG/1
40 TABLET ORAL 2 TIMES DAILY
Status: DISCONTINUED | OUTPATIENT
Start: 2018-12-19 | End: 2018-12-20 | Stop reason: HOSPADM

## 2018-12-19 RX ORDER — FUROSEMIDE 40 MG/1
40 TABLET ORAL DAILY
Qty: 30 TABLET | Refills: 11 | Status: SHIPPED | OUTPATIENT
Start: 2018-12-20 | End: 2018-12-19 | Stop reason: HOSPADM

## 2018-12-19 RX ORDER — MICONAZOLE NITRATE 2 %
POWDER (GRAM) TOPICAL 2 TIMES DAILY
Refills: 0 | COMMUNITY
Start: 2018-12-19 | End: 2019-07-29

## 2018-12-19 RX ORDER — ALLOPURINOL 300 MG/1
300 TABLET ORAL DAILY
Status: ON HOLD
Start: 2018-12-20 | End: 2019-01-23 | Stop reason: SDUPTHER

## 2018-12-19 RX ORDER — CARVEDILOL 3.12 MG/1
3.12 TABLET ORAL 2 TIMES DAILY
Qty: 60 TABLET | Refills: 11 | Status: ON HOLD | OUTPATIENT
Start: 2018-12-19 | End: 2019-01-23 | Stop reason: SDUPTHER

## 2018-12-19 RX ORDER — INSULIN ASPART 100 [IU]/ML
3 INJECTION, SOLUTION INTRAVENOUS; SUBCUTANEOUS 3 TIMES DAILY
Refills: 0 | Status: ON HOLD
Start: 2018-12-19 | End: 2019-01-23 | Stop reason: SDUPTHER

## 2018-12-19 RX ORDER — FUROSEMIDE 40 MG/1
40 TABLET ORAL 2 TIMES DAILY
Qty: 60 TABLET | Refills: 11
Start: 2018-12-19 | End: 2018-12-28

## 2018-12-19 RX ORDER — OXYCODONE AND ACETAMINOPHEN 5; 325 MG/1; MG/1
1 TABLET ORAL EVERY 6 HOURS PRN
Qty: 20 TABLET | Refills: 0 | Status: SHIPPED | OUTPATIENT
Start: 2018-12-19 | End: 2018-12-28

## 2018-12-19 RX ORDER — BRIMONIDINE TARTRATE 1 MG/ML
1 SOLUTION/ DROPS OPHTHALMIC 2 TIMES DAILY
Qty: 3 ML | Refills: 11 | Status: SHIPPED | OUTPATIENT
Start: 2018-12-19 | End: 2019-02-08

## 2018-12-19 RX ADMIN — COLLAGENASE SANTYL: 250 OINTMENT TOPICAL at 08:12

## 2018-12-19 RX ADMIN — STANDARDIZED SENNA CONCENTRATE AND DOCUSATE SODIUM 1 TABLET: 8.6; 5 TABLET, FILM COATED ORAL at 10:12

## 2018-12-19 RX ADMIN — FUROSEMIDE 40 MG: 40 TABLET ORAL at 05:12

## 2018-12-19 RX ADMIN — DORZOLAMIDE HYDROCHLORIDE AND TIMOLOL MALEATE 1 DROP: 20; 5 SOLUTION OPHTHALMIC at 10:12

## 2018-12-19 RX ADMIN — OXYCODONE AND ACETAMINOPHEN 1 TABLET: 5; 325 TABLET ORAL at 04:12

## 2018-12-19 RX ADMIN — OMEGA-3 FATTY ACIDS CAP 1000 MG 1 CAPSULE: 1000 CAP at 08:12

## 2018-12-19 RX ADMIN — INSULIN ASPART 3 UNITS: 100 INJECTION, SOLUTION INTRAVENOUS; SUBCUTANEOUS at 08:12

## 2018-12-19 RX ADMIN — CARVEDILOL 3.12 MG: 3.12 TABLET, FILM COATED ORAL at 08:12

## 2018-12-19 RX ADMIN — ALLOPURINOL 200 MG: 100 TABLET ORAL at 08:12

## 2018-12-19 RX ADMIN — BRIMONIDINE TARTRATE 1 DROP: 1 SOLUTION/ DROPS OPHTHALMIC at 08:12

## 2018-12-19 RX ADMIN — Medication 10 ML: at 05:12

## 2018-12-19 RX ADMIN — PREDNISOLONE ACETATE 1 DROP: 10 SUSPENSION/ DROPS OPHTHALMIC at 08:12

## 2018-12-19 RX ADMIN — MEROPENEM 2 G: 1 INJECTION, POWDER, FOR SOLUTION INTRAVENOUS at 04:12

## 2018-12-19 RX ADMIN — INSULIN ASPART 3 UNITS: 100 INJECTION, SOLUTION INTRAVENOUS; SUBCUTANEOUS at 11:12

## 2018-12-19 RX ADMIN — INSULIN ASPART 3 UNITS: 100 INJECTION, SOLUTION INTRAVENOUS; SUBCUTANEOUS at 05:12

## 2018-12-19 RX ADMIN — BRIMONIDINE TARTRATE 1 DROP: 1 SOLUTION/ DROPS OPHTHALMIC at 10:12

## 2018-12-19 RX ADMIN — MEROPENEM 2 G: 1 INJECTION, POWDER, FOR SOLUTION INTRAVENOUS at 11:12

## 2018-12-19 RX ADMIN — CLOPIDOGREL BISULFATE 75 MG: 75 TABLET ORAL at 08:12

## 2018-12-19 RX ADMIN — MEROPENEM 2 G: 1 INJECTION, POWDER, FOR SOLUTION INTRAVENOUS at 05:12

## 2018-12-19 RX ADMIN — TUBERCULIN PURIFIED PROTEIN DERIVATIVE 5 UNITS: 5 INJECTION, SOLUTION INTRADERMAL at 04:12

## 2018-12-19 RX ADMIN — INSULIN ASPART 2 UNITS: 100 INJECTION, SOLUTION INTRAVENOUS; SUBCUTANEOUS at 05:12

## 2018-12-19 RX ADMIN — DORZOLAMIDE HYDROCHLORIDE AND TIMOLOL MALEATE 1 DROP: 20; 5 SOLUTION OPHTHALMIC at 08:12

## 2018-12-19 RX ADMIN — ASPIRIN 81 MG 81 MG: 81 TABLET ORAL at 08:12

## 2018-12-19 RX ADMIN — ENOXAPARIN SODIUM 40 MG: 100 INJECTION SUBCUTANEOUS at 04:12

## 2018-12-19 RX ADMIN — OMEGA-3 FATTY ACIDS CAP 1000 MG 1 CAPSULE: 1000 CAP at 10:12

## 2018-12-19 RX ADMIN — Medication 10 ML: at 06:12

## 2018-12-19 RX ADMIN — OXYCODONE AND ACETAMINOPHEN 1 TABLET: 5; 325 TABLET ORAL at 10:12

## 2018-12-19 RX ADMIN — VANCOMYCIN HYDROCHLORIDE 1250 MG: 1 INJECTION, POWDER, LYOPHILIZED, FOR SOLUTION INTRAVENOUS at 08:12

## 2018-12-19 RX ADMIN — INSULIN ASPART 2 UNITS: 100 INJECTION, SOLUTION INTRAVENOUS; SUBCUTANEOUS at 11:12

## 2018-12-19 RX ADMIN — STANDARDIZED SENNA CONCENTRATE AND DOCUSATE SODIUM 1 TABLET: 8.6; 5 TABLET, FILM COATED ORAL at 08:12

## 2018-12-19 RX ADMIN — CARVEDILOL 3.12 MG: 3.12 TABLET, FILM COATED ORAL at 10:12

## 2018-12-19 RX ADMIN — Medication: at 10:12

## 2018-12-19 RX ADMIN — FUROSEMIDE 40 MG: 40 TABLET ORAL at 08:12

## 2018-12-19 RX ADMIN — EZETIMIBE 10 MG: 10 TABLET ORAL at 08:12

## 2018-12-19 RX ADMIN — Medication 10 ML: at 04:12

## 2018-12-19 RX ADMIN — Medication 10 ML: at 12:12

## 2018-12-19 RX ADMIN — Medication: at 08:12

## 2018-12-19 RX ADMIN — INSULIN ASPART 1 UNITS: 100 INJECTION, SOLUTION INTRAVENOUS; SUBCUTANEOUS at 10:12

## 2018-12-19 NOTE — PROGRESS NOTES
[facility]  Vascular Surgery  Progress Note    Patient Name: Marvin Ray  MRN: [id number]  Admission Date: 12/10/2018  Primary Care Provider: Rubén Davis MD    Subjective:     Interval History: No complaints.  Receiving local wound care.    Post-Op Info:  * No surgery found *           Medications:  Continuous Infusions:  Scheduled Meds:   [START ON 12/20/2018] allopurinol  300 mg Oral Daily    aspirin  81 mg Oral Daily    brimonidine 0.1%  1 drop Both Eyes BID    carvedilol  3.125 mg Oral BID    clopidogrel  75 mg Oral Daily    collagenase   Topical (Top) Daily    dorzolamide-timolol 2-0.5%  1 drop Both Eyes TID    enoxaparin  40 mg Subcutaneous Daily    ezetimibe  10 mg Oral Daily    furosemide  40 mg Oral BID    insulin aspart U-100  3 Units Subcutaneous TIDWM    insulin detemir U-100  10 Units Subcutaneous QHS    meropenem (MERREM) IVPB  2 g Intravenous Q8H    miconazole NITRATE 2 %   Topical (Top) BID    omega 3-dha-epa-fish oil  1 capsule Oral BID    prednisoLONE acetate  1 drop Right Eye Daily    senna-docusate 8.6-50 mg  1 tablet Oral BID    sodium chloride 0.9%  10 mL Intravenous Q6H    vancomycin (VANCOCIN) IVPB  1,250 mg Intravenous Q24H     PRN Meds:acetaminophen, dextrose 50%, dextrose 50%, glucagon (human recombinant), glucose, glucose, insulin aspart U-100, ondansetron, oxyCODONE-acetaminophen, ramelteon, Flushing PICC Protocol **AND** sodium chloride 0.9% **AND** sodium chloride 0.9%     Objective:     Vital Signs (Most Recent):  Temp: 97.7 °F (36.5 °C) (12/19/18 1125)  Pulse: 74 (12/19/18 1125)  Resp: 18 (12/19/18 1125)  BP: (!) 124/58 (12/19/18 1125)  SpO2: (!) 93 % (12/19/18 1125) Vital Signs (24h Range):  Temp:  [97.3 °F (36.3 °C)-98.7 °F (37.1 °C)] 97.7 °F (36.5 °C)  Pulse:  [74-87] 74  Resp:  [18-22] 18  SpO2:  [93 %-97 %] 93 %  BP: (110-126)/(58-69) 124/58     Date 12/19/18 0700 - 12/20/18 0659   Shift 8784-9548 2440-1995 4507-5904 24 Hour Total    INTAKE   P.O. 480   480   IV Piggyback 350   350   Shift Total(mL/kg) 830(7.7)   830(7.7)   OUTPUT   Urine(mL/kg/hr) 725(0.8)   725   Shift Total(mL/kg) 725(6.8)   725(6.8)   Weight (kg) 107.3 107.3 107.3 107.3       Physical Exam   Constitutional: He is oriented to person, place, and time. He appears well-developed and well-nourished. No distress.   HENT:   Head: Normocephalic and atraumatic.   Eyes: Conjunctivae are normal.   Neck: Neck supple.   Cardiovascular: Normal rate.   Pulses:       Dorsalis pedis pulses are Detected w/ doppler on the right side, and Detected w/ doppler on the left side.        Posterior tibial pulses are Detected w/ doppler on the right side, and Detected w/ doppler on the left side.   Pulmonary/Chest: Effort normal. No respiratory distress.   Abdominal: Soft. He exhibits no distension and no mass. There is no tenderness. There is no rebound and no guarding.   Musculoskeletal: Normal range of motion. He exhibits no edema, tenderness or deformity.   Feet:   Left Foot:   Skin Integrity: Positive for skin breakdown. Negative for erythema.   Neurological: He is alert and oriented to person, place, and time. A sensory deficit is present.   Skin: Skin is warm and dry. Capillary refill takes 2 to 3 seconds. No rash noted. No erythema. No pallor.        Vitals reviewed.      Significant Labs:  All pertinent labs from the last 24 hours have been reviewed.    Significant Diagnostics:  I have reviewed all pertinent imaging results/findings within the past 24 hours.    Assessment/Plan:     Open wound of left foot    -L foot wound healing s/p debridement and amputation 12/12/18, recent cardiac arrest - agree with Abx, continued wound care per Wound Care RN recs, offloading  -Wound debrided at bedside today with good bleeding 12/18/18 - cont local wound care  -No revascularization indicated at this time  -If pt being discharged, will schedule for close f/u.  If not, will evaluate tomorrow.          Mitch Chan MD  Vascular Surgery  [facility]

## 2018-12-19 NOTE — PLAN OF CARE
Problem: Physical Therapy Goal  Goal: Physical Therapy Goal  Goals to be met by: 2018     Patient will increase functional independence with mobility by performin. Supine to sit with Modified [place]  2. Sit to stand transfer with Minimal Assistance  3. Gait to e assessed.   4. Lower extremity exercise program x10 reps per handout, with supervision  5.  Bed to chair t/f with Min A  6.  W/C propulsion x 150' with Supervision     Outcome: Ongoing (interventions implemented as appropriate)  Pt working well towards goals. Pt propelled wc 200 ft with supervision

## 2018-12-19 NOTE — PROGRESS NOTES
Dr. Chan debrided left foot wound at bedside. To continue local wound care daily with Santyl to debride. Treatment plan discussed with patient and sister. Feet suspended off bed with Z flex boots.

## 2018-12-19 NOTE — ASSESSMENT & PLAN NOTE
Abdominal US with ascites  Previous paracentesis in 10/2018 with SAAG 0.9, not consistent with portal hypertension  Repeat paracentesis 12/12 with light green clear fluid. SAAG 0.6. Total protein 4.2.  with 196 PMNs, not consistent with SBP.   Ascites cytology and AFB culture and smear pending  UA negative for protein, UPC not consistent with nephrotic syndrome  Not clinically or radiologically consistent with pancreatitis  GI consulted in 10/2018 with prior paracentesis and suggested it could be due to CHF. Total protein is consistent with this but SAAG is not. Albumin is also dismally low and he does have generalized edema.

## 2018-12-19 NOTE — PROCEDURES
Date: 12/19/18    Indications: Daniel Serrano is a 67 y.o. male with left foot wound.  The patient would benefit from debridement for limb salvage.    Pre-operative Diagnosis:    1. Left lower extremity atherosclerosis with left foot wound with necrosis of underlying bone  2. HTN  3. HLD  4. DM    Post-operative Diagnosis:   1. Same    Procedure:   1. Left foot wound debridement to dorsal foot. Wound measurement, 25 cm x 20 cm x 1 cm  2. Left foot wound debridement, medial ankle.  Wound measurement, 5 cm x 6 cm x 0.5 cm    Surgeon: Mitch Chan MD    Assistants: None    Anesthesia: None    Estimate Blood Loss:  Less than 5mL            Specimens: None               Complications:  None; patient tolerated the procedure well.     Procedure in detail: The patient was seen in the hospital room and placed in the supine position on the treatment table. Attention was directed to the wounds which was located on the left foot. The wounds were covered with dry fibrin and a mild callused rim. No acute signs of infection were noted. The wound was non-tender. The wound was prepped with betadine. Utilizing a scalpel, I debrided the wound full-thickness through skin to excise nonviable tissue inside the wound margins down to bone.  The patient tolerated well. Because of a lack of sensation, anesthesia was not necessary. The wound was flushed with sterile saline. There was appropriate bleeding with debridement which was well controlled with direct pressure. The wound was then dressed with wet to dry dressing. The patient tolerated the procedure well.

## 2018-12-19 NOTE — PT/OT/SLP PROGRESS
"Physical Therapy Treatment    Patient Name:  Marvin Ray   MRN:  [id number]    Recommendations:     Discharge Recommendations:  nursing facility, skilled   Discharge Equipment Recommendations: none   Barriers to discharge: Decreased caregiver support    Assessment:     Marvin Ray is a 58 y.o. male admitted with a medical diagnosis of Cardiac arrest.  He presents with the following impairments/functional limitations:  weakness, impaired endurance, impaired balance, gait instability, impaired cardiopulmonary response to activity, decreased lower extremity function, impaired coordination, impaired sensation, decreased safety awareness, impaired self care skills, impaired skin, orthopedic precautions, impaired functional mobilty, decreased coordination, edema .  Pt is able to propel wc 200 ft with supervision. Pt needed vcs for proper technique with transferring using nwb on affected limb.  Pt non-compliance    Rehab Prognosis:  g; patient would benefit from acute skilled PT services to address these deficits and reach maximum level of function.      Recent Surgery: * No surgery found *      Plan:     During this hospitalization, patient to be seen daily to address the above listed problems via therapeutic activities, therapeutic exercises, wheelchair management/training  · Plan of Care Expires:  12/29/18   Plan of Care Reviewed with: patient    Subjective     Communicated with nurse prior to session.  Patient found in wc upon PT entry to room, agreeable to treatment.      Chief Complaint: none  Patient comments/goals: "It's impossible not to put any weight on this foot."  Pain/Comfort:  · Pain Rating 1: 0/10    Patients cultural, spiritual, Orthodox conflicts given the current situation:      Objective:     Patient found with: PICC line     General Precautions: Standard, fall   Orthopedic Precautions:LLE non weight bearing   Braces: (ortho shoe)     Functional Mobility:  · Bed Mobility:     · Scooting: " modified independence  · Sit to Supine: modified independence  · Transfers:     · Bed to Chair: stand by assistance with  Slide board  using  Scoot Pivot  · Wheelchair Propulsion:  200 FT /c  S      AM-PAC 6 CLICK MOBILITY  Turning over in bed (including adjusting bedclothes, sheets and blankets)?: 3  Sitting down on and standing up from a chair with arms (e.g., wheelchair, bedside commode, etc.): 2  Moving from lying on back to sitting on the side of the bed?: 3  Moving to and from a bed to a chair (including a wheelchair)?: 3  Need to walk in hospital room?: 1  Climbing 3-5 steps with a railing?: 1  Basic Mobility Total Score: 13       Therapeutic Activities and Exercises:   BLE Steven seated x 10 ; LAQ, HIP FLEXION/ABD/ADD, HR, TT    Patient left supine with call button in reach and NURSE notified..    GOALS:   Multidisciplinary Problems     Physical Therapy Goals        Problem: Physical Therapy Goal    Goal Priority Disciplines Outcome Goal Variances Interventions   Physical Therapy Goal     PT, PT/OT Ongoing (interventions implemented as appropriate)     Description:  Goals to be met by: 2018     Patient will increase functional independence with mobility by performin. Supine to sit with Modified New Site  2. Sit to stand transfer with Minimal Assistance  3. Gait to e assessed.   4. Lower extremity exercise program x10 reps per handout, with supervision  5.  Bed to chair t/f with Min A  6.  W/C propulsion x 150' with Supervision                      Time Tracking:     PT Received On: 18  PT Start Time: 1550     PT Stop Time: 1613  PT Total Time (min): 23 min     Billable Minutes: Therapeutic Exercise 13 and Train/Wheelchair Management 10    Treatment Type: Treatment  PT/PTA: PTA     PTA Visit Number: 1     Zaki Melo PTA  2018

## 2018-12-19 NOTE — ASSESSMENT & PLAN NOTE
- stable on meropenem and vancomycin  - OK to discharge from an ID standpoint on same abx to complete 6 weeks of abx therapy  - needs weekly labs (CBC, CMP, ESR, CRP, vancomycin trough) faxed to [facility] at [phone]  - have patient f/u with ID in 2-4 weeks

## 2018-12-19 NOTE — SUBJECTIVE & OBJECTIVE
Interval History: Events noted. No complaints. Tolerating trini and vancomycin. Denies fever or chills.     Review of Systems   Constitutional: Negative for chills and fever.   All other systems reviewed and are negative.    Objective:     Vital Signs (Most Recent):  Temp: 97.7 °F (36.5 °C) (12/19/18 0800)  Pulse: 85 (12/19/18 0800)  Resp: 20 (12/19/18 0800)  BP: 126/69 (12/19/18 0800)  SpO2: (!) 93 % (12/19/18 0800) Vital Signs (24h Range):  Temp:  [97.3 °F (36.3 °C)-98.7 °F (37.1 °C)] 97.7 °F (36.5 °C)  Pulse:  [75-87] 85  Resp:  [18-22] 20  SpO2:  [93 %-97 %] 93 %  BP: (104-126)/(58-69) 126/69     Weight: 107.3 kg (236 lb 8.9 oz)  Body mass index is 33.94 kg/m².    Estimated Creatinine Clearance: 120.4 mL/min (based on SCr of 0.8 mg/dL).    Physical Exam   Constitutional: He is oriented to person, place, and time. He appears well-developed and well-nourished. No distress.   HENT:   Head: Normocephalic and atraumatic.   Right Ear: External ear normal.   Left Ear: External ear normal.   Mouth/Throat: Oropharynx is clear and moist.   Eyes: Conjunctivae and EOM are normal. Pupils are equal, round, and reactive to light.   Cardiovascular: Normal rate and normal heart sounds.   Pulmonary/Chest: Effort normal and breath sounds normal. No stridor. No respiratory distress.   Abdominal: Soft. Bowel sounds are normal. He exhibits no distension. There is no tenderness.   Musculoskeletal: Normal range of motion. He exhibits deformity.   dressed   Neurological: He is alert and oriented to person, place, and time.   Skin: He is not diaphoretic.   Psychiatric: He has a normal mood and affect. His behavior is normal. Judgment and thought content normal.   Nursing note and vitals reviewed.      Significant Labs:   Blood Culture:   Recent Labs   Lab 10/10/18  1247 11/08/18  1817 11/08/18  1820 12/11/18  1630 12/11/18  1631   LABBLOO No growth after 5 days.  Gram stain yudelka bottle: Gram positive cocci in chains resembling Strep    Results called to and read back by: Chloé Welch  Gram stain aer bottle: Gram positive cocci in chains resembling Strep   Positive results previously called 10/11/2018  18:47  GROUP G STREPTOCOCCUS  Beta-hemolytic streptococci are routinely susceptible to   penicillins,cephalosporins and carbapenems.   No growth after 5 days. No growth after 5 days. No growth after 5 days. No growth after 5 days.     Urine Culture: No results for input(s): LABURIN in the last 4320 hours.  Wound Culture:   Recent Labs   Lab 11/14/18  1338 11/16/18  0838 11/16/18  0848 11/16/18  0910 12/12/18  1555   LABAERO DIPHTHEROIDS  Few  No further workup.   ACINETOBACTER BAUMANNII/HAEMOLYTICUS  Moderate    ENTEROCOCCUS FAECALIS  Few    STREPTOCOCCUS GROUP G  Rare  Beta-hemolytic streptococci are routinely susceptible to   penicillins,cephalosporins and carbapenems.   ACINETOBACTER BAUMANNII/HAEMOLYTICUS  Moderate    STREPTOCOCCUS GROUP G  Few  Beta-hemolytic streptococci are routinely susceptible to   penicillins,cephalosporins and carbapenems.   ACINETOBACTER BAUMANNII/HAEMOLYTICUS  Moderate   No growth       Significant Imaging: I have reviewed all pertinent imaging results/findings within the past 24 hours.

## 2018-12-19 NOTE — ASSESSMENT & PLAN NOTE
-L foot wound healing s/p debridement and amputation 12/12/18, recent cardiac arrest - agree with Abx, continued wound care per Wound Care RN recs, offloading  -Wound debrided at bedside today with good bleeding 12/18/18 - cont local wound care  -No revascularization indicated at this time  -If pt being discharged, will schedule for close f/u.  If not, will evaluate tomorrow.

## 2018-12-19 NOTE — PROGRESS NOTES
TN met with pt after conversation with Whitley from [facility] and representative from insurance company, at this time pt states he [place] stay [place] [facility] [place] will go if they have to because they have no other choice.   TN avised pt and sister that Whitley with  [facility] is requesting for a single case agreement for [place] but that they may not approve as  [facility] are only in network with pts insurance. TN sent referral to [facility] via [facility] and advised that the pt has been accepted to [facility] which is the pts preference, but the facility is not in network with pts insurance and only [facility] facilities are. Requested to please contact Tenisha [phone] if able to accept pt whom is ready for discharge on today if insurance not willing to do single case agreement for pt to remain on [place] which is pts preference.  TN to follow in [facility] [employer] for response.    1147- call received from Whitley advising that they are not able to get pts insurance to do a single case agreement and pt will have to go to [facility]    1155- MD, pt and pts sister advised of above information and that as soon as information available     0622- TN advised via message in [facility] from Rhys with [facility] that she is at the facility completing admission paperwork for [employer].  TN requested to be contacted once complete. TN to continue to follow

## 2018-12-19 NOTE — PLAN OF CARE
Problem: Adult Inpatient Plan of Care  Goal: Plan of Care Review   12/19/18 0347   Plan of Care Review   Plan of Care Reviewed With patient;sibling       Problem: Wound  Goal: Optimal Wound Healing    Intervention: Promote Effective Wound Healing   12/11/18 1245 12/19/18 0347   Monitor and Manage Anemia   Oral Nutrition Promotion --  rest periods promoted;medicated   Prevent or Manage Pain   Pain Management Interventions pain management plan reviewed with patient/caregiver;relaxation techniques promoted;quiet environment facilitated;position adjusted;care clustered --    Prevent or Manage Pain   Sleep/Rest Enhancement --  awakenings minimized;family presence promoted

## 2018-12-19 NOTE — UM SECONDARY REVIEW
[facility]    IP Extended Stay > 10  Hospital Day # 9  Placement Issue  TN met with pt after conversation with Whitley from [facility] and representative from insurance company, at this time pt states he would rather stay [place] [facility] vs [place] [facility] [place] but will go if they have to because they have no other choice.   TN avised pt and sister that Whitley with  [facility] is requesting for a single case agreement for pt to stay on the [facility] as preferred but that they may not approve as  [facility] are only in network with pts insurance. TN sent referral to [facility] via [facility] and advised that the pt has been accepted to [facility] which is the pts preference, but the facility is not in network with pts insurance and only [facility] facilities are. Requested to please contact Tenisha [phone] if able to accept pt whom is ready for discharge on today if insurance not willing to do single case agreement for pt to remain on [facility] which is pts preference.  TN to follow in [employer] for response.    Awaiting Decision  LOS: approved an agreement with D/C plan

## 2018-12-19 NOTE — PROGRESS NOTES
[facility] Medicine  Progress Note    Patient Name: Marvin Ray  MRN: [id number]  Patient Class: IP- Inpatient   Admission Date: 12/10/2018  Length of Stay: 7 days  Attending Physician: Kirsten Trinh MD  Primary Care Provider: Rubén Davis MD        Subjective:     Principal Problem:Cardiac arrest    HPI:  58 y.o. male with DM2, HLD, HTN, CAD, chronic combined heart failure, CKD stage III presents with a complaint of left foot wound infection.  Prior cultures show only sensitive to meropenem.  Outpatient IV antibiotics were attempted to be setup last night and throughout the day today through his PCP but were unable to be procured.  He denies fever, chills, cough, SOB, chest pain, palpitations, headaches, vision changes, N/V/D, abdominal pain, or dysuria.  Placed in observation for PICC line placement and to setup home IV abx.    Hospital Course:  Patient admitted for PICC line placement for IV antibiotics for left wound infection -12/5/18 wound culture Acinetobacter baumannii/haemolyticus sensitive to meropenem. IV meropenum started in ED. On 12/11/18, he suffered a cardiac arrest with ROSC after ACLS. Per his sister, he was standing getting ready to leave the hospital and then fell back into the bed. He was intubated during the code and transferred to ICU. He was hypotensive after the arrest requiring Levophed. Cardiology and Pulmonary consulted. ICD interrogated- no events, no shocks. Suspect PEA arrest due to severe infection with underlying biventricular failure and severe CAD. TTE with EF 30%, diastolic dysfunction, new RV changes. CT negative for PE. Troponins elevated at 0.5 but downtrended. Initially started ASA, Plavix, and full dose Lovenox with concern for NSTEMI, but unlikely NSTEMI given minimal troponin elevations that could be explained by chest compressions alone. Lovenox discontinued. Continue DAPT given severe CAD with no intervention (not a candidate for  PCI or CABG given anatomy). Blood cultures checked and NGTD. Wound care following for DM foot wounds. Abdominal US with ascites s/p paracentesis with 2.4L of clear light green ascites removed on 12/12. SAAG 0.6 which is NOT consistent with portal hypertension. Total protein 4.2 which would be consistent with cardiac etiology. No SBP. No pancreatitis on CT abd/pelvis and none clinically either. UA and UPC did not show protein; ascites not from nephrotic syndrome. Cytology and AFB culture and smear on ascitic fluid pending. He was extubated to BiPAP on 12/13 and weaned to nasal cannula. Levophed off since 12/14. He continues to improve. He was stable for step down to floor on 12/15.  PT/OT recommending SNF.     Interval History: Reports feeling tired but better overall.    Review of Systems   Constitutional: Positive for fatigue. Negative for fever.   Respiratory: Negative for cough and wheezing.    Cardiovascular: Negative for chest pain and palpitations.   Gastrointestinal: Negative for nausea and vomiting.   Musculoskeletal: Positive for gait problem. Negative for neck stiffness.   Neurological: Negative for seizures and syncope.     Objective:     Vital Signs (Most Recent):  Temp: 97.3 °F (36.3 °C) (12/18/18 1627)  Pulse: 87 (12/18/18 1627)  Resp: 18 (12/18/18 1627)  BP: 116/67 (12/18/18 1627)  SpO2: 96 % (12/18/18 1627) Vital Signs (24h Range):  Temp:  [97.3 °F (36.3 °C)-98.5 °F (36.9 °C)] 97.3 °F (36.3 °C)  Pulse:  [75-87] 87  Resp:  [18-19] 18  SpO2:  [94 %-100 %] 96 %  BP: (101-122)/(55-72) 116/67     Weight: 109.3 kg (240 lb 15.4 oz)  Body mass index is 34.57 kg/m².    Intake/Output Summary (Last 24 hours) at 12/18/2018 1953  Last data filed at 12/18/2018 0847  Gross per 24 hour   Intake --   Output 500 ml   Net -500 ml      Physical Exam   Constitutional: He is oriented to person, place, and time. He appears well-developed and well-nourished. No distress.   HENT:   Head: Normocephalic and atraumatic.    Mouth/Throat: Oropharynx is clear and moist.   Eyes: EOM are normal. Pupils are equal, round, and reactive to light.   Neck: Normal range of motion. Neck supple.   Cardiovascular: Normal rate and normal heart sounds. Exam reveals no gallop and no friction rub.   No murmur heard.  Pulmonary/Chest: Effort normal and breath sounds normal. No stridor. No respiratory distress. He has no wheezes. He has no rales.   Abdominal: Soft. Bowel sounds are normal. He exhibits no mass. There is no tenderness. There is no guarding.   Bandage in RLQ dry and intact, distended but soft   Musculoskeletal: He exhibits edema.   Neurological: He is alert and oriented to person, place, and time.   Skin: He is not diaphoretic.   Bilateral feet dressed   Nursing note and vitals reviewed.      Significant Labs:   BMP:   Recent Labs   Lab 12/18/18  0508         K 4.2      CO2 29   BUN 34*   CREATININE 0.8   CALCIUM 8.4*     CBC:   Recent Labs   Lab 12/17/18  0600 12/18/18  0508   WBC 7.39 7.09   HGB 8.9* 8.9*   HCT 28.2* 27.6*    318     Assessment/Plan:      * Cardiac arrest    On 12/11  PEA? ICD interrogated with no shocks or abnormal rhythms.  Etiology unclear. No PE, no NSTEMI, no electrolyte abnormalities, no hypoxia, no toxins  May be PEA arrest due to severe infection in setting of biventricular cardiomyopathy and severe CAD  Now stable on Telemetry.  PT/OT consulted and recommending SNF.  SW consult.       Goals of care, counseling/discussion    He has been counseled on advanced directives. No decisions made yet. He is still legally  but not in contact with wife- needs HCPOA documentation prior to discharge.        Acute renal insufficiency    Due to cardiac arrest. Cr up to 1.4 from baseline ~0.8. Now resolved.        Severe malnutrition    Appreciate nutrition recs. Albumin 1.7.     Other ascites    Abdominal US with ascites  Previous paracentesis in 10/2018 with SAAG 0.9, not consistent with portal  hypertension  Repeat paracentesis 12/12 with light green clear fluid. SAAG 0.6. Total protein 4.2.  with 196 PMNs, not consistent with SBP.   Ascites cytology and AFB culture and smear pending  UA negative for protein, UPC not consistent with nephrotic syndrome  Not clinically or radiologically consistent with pancreatitis  GI consulted in 10/2018 with prior paracentesis and suggested it could be due to CHF. Total protein is consistent with this but SAAG is not. Albumin is also dismally low and he does have generalized edema.       Elevated troponin    Troponin elevated to 0.5 max with new TWIs  Initially started NSTEMI protocol on 12/11 with concern for possible NSTEMI as cause of cardiac arrest  Troponins downtrended  Discontinued weight based lovenox on 12/12 after discussion with Cardiology     Diabetic foot infection    Tissue culture obtained by Vascular Surgery obtained 12/5/18 grew Acinetobacter baumannii/haemolyticus sensitive to meropenem.    ID consulted- recommend meropenem and vanc x6 weeks   PICC in place R arm for outpatient antibiotics  Wound care following  ESR 72, CRP 89 on 12/11- trend weekly  Poor wound healing likely given PAD and DM with extensive wound. Discussed with patient on 12/14  Will need ID, Vascular, Wound care follow up after discharge     Open wound of left foot    As above     Chronic combined systolic and diastolic heart failure    No evidence of decompensation on admit  Post code BNP elevated, though this is expected  Patient has ascites and bilateral pleural effusions. Does not have significant pulmonary edema  Will change lasix to 40mg IV daily- patient has good UOP and improving Cr with this regimen  Likely will need to add spironolactone for ascites and CHF when tolerated by BP  Monitor UOP    TTE 12/11  · Severely decreased left ventricular systolic function. The estimated ejection fraction is 20%  · Severe global hypokinetic wall motion. Cannot exclude  RWMA.  · Septal wall has abnormal motion. Systolic and diastolic flattening of the interventricular septum consistent with right ventricle pressure and volume overload.  · Left ventricular diastolic dysfunction.  · Mild right ventricular enlargement.  · Mildly to moderately reduced right ventricular systolic function.  · Moderate tricuspid regurgitation.  · There is a large left pleural effusion.     PVD (peripheral vascular disease)    Continue asa, plavix, lipid reduction meds     CAD (coronary artery disease)    Patient has severe 3v disease per [facility] in 2016 with no targets for PCI or CABG  Continue asa. Added plavix (discussed with Juan Antonio). Continue lipid management. Hold BB and ACEi for hypotension       Essential hypertension    Hold all BP meds for now  Currently normotensive off BP meds.     Tophaceous gout    Continue home allopurinol       Neovascular glaucoma of both eyes    Continue eye drops     HLD (hyperlipidemia)    Last lipids 10/2018 are well controlled  Continue home regimen of ezetimibe and fish oil (statin intolerance)     DM type 2 with diabetic peripheral neuropathy    Last HgbA1c   Lab Results   Component Value Date    HGBA1C 8.3 (H) 10/30/2018   Hold oral antihyperglycemics while inpatient    Meds: detemir 5 + SSI  ACHS accuchecks, ADA diet, hypoglycemic protocol          VTE Risk Mitigation (From admission, onward)        Ordered     enoxaparin injection 40 mg  Daily      12/12/18 0820     IP VTE HIGH RISK PATIENT  Once      12/10/18 2126              Kirsten Trinh MD  Department of Hospital Medicine   [facility]

## 2018-12-19 NOTE — H&P (VIEW-ONLY)
[facility]  Vascular Surgery  Progress Note    Patient Name: Marvin Ray  MRN: [id number]  Admission Date: 12/10/2018  Primary Care Provider: Rubén Davis MD    Subjective:     Interval History: No complaints.  Receiving local wound care.    Post-Op Info:  * No surgery found *           Medications:  Continuous Infusions:  Scheduled Meds:   [START ON 12/20/2018] allopurinol  300 mg Oral Daily    aspirin  81 mg Oral Daily    brimonidine 0.1%  1 drop Both Eyes BID    carvedilol  3.125 mg Oral BID    clopidogrel  75 mg Oral Daily    collagenase   Topical (Top) Daily    dorzolamide-timolol 2-0.5%  1 drop Both Eyes TID    enoxaparin  40 mg Subcutaneous Daily    ezetimibe  10 mg Oral Daily    furosemide  40 mg Oral BID    insulin aspart U-100  3 Units Subcutaneous TIDWM    insulin detemir U-100  10 Units Subcutaneous QHS    meropenem (MERREM) IVPB  2 g Intravenous Q8H    miconazole NITRATE 2 %   Topical (Top) BID    omega 3-dha-epa-fish oil  1 capsule Oral BID    prednisoLONE acetate  1 drop Right Eye Daily    senna-docusate 8.6-50 mg  1 tablet Oral BID    sodium chloride 0.9%  10 mL Intravenous Q6H    vancomycin (VANCOCIN) IVPB  1,250 mg Intravenous Q24H     PRN Meds:acetaminophen, dextrose 50%, dextrose 50%, glucagon (human recombinant), glucose, glucose, insulin aspart U-100, ondansetron, oxyCODONE-acetaminophen, ramelteon, Flushing PICC Protocol **AND** sodium chloride 0.9% **AND** sodium chloride 0.9%     Objective:     Vital Signs (Most Recent):  Temp: 97.7 °F (36.5 °C) (12/19/18 1125)  Pulse: 74 (12/19/18 1125)  Resp: 18 (12/19/18 1125)  BP: (!) 124/58 (12/19/18 1125)  SpO2: (!) 93 % (12/19/18 1125) Vital Signs (24h Range):  Temp:  [97.3 °F (36.3 °C)-98.7 °F (37.1 °C)] 97.7 °F (36.5 °C)  Pulse:  [74-87] 74  Resp:  [18-22] 18  SpO2:  [93 %-97 %] 93 %  BP: (110-126)/(58-69) 124/58     Date 12/19/18 0700 - 12/20/18 0659   Shift 2025-6562 8850-1175 0996-1366 24 Hour Total    INTAKE   P.O. 480   480   IV Piggyback 350   350   Shift Total(mL/kg) 830(7.7)   830(7.7)   OUTPUT   Urine(mL/kg/hr) 725(0.8)   725   Shift Total(mL/kg) 725(6.8)   725(6.8)   Weight (kg) 107.3 107.3 107.3 107.3       Physical Exam   Constitutional: He is oriented to person, place, and time. He appears well-developed and well-nourished. No distress.   HENT:   Head: Normocephalic and atraumatic.   Eyes: Conjunctivae are normal.   Neck: Neck supple.   Cardiovascular: Normal rate.   Pulses:       Dorsalis pedis pulses are Detected w/ doppler on the right side, and Detected w/ doppler on the left side.        Posterior tibial pulses are Detected w/ doppler on the right side, and Detected w/ doppler on the left side.   Pulmonary/Chest: Effort normal. No respiratory distress.   Abdominal: Soft. He exhibits no distension and no mass. There is no tenderness. There is no rebound and no guarding.   Musculoskeletal: Normal range of motion. He exhibits no edema, tenderness or deformity.   Feet:   Left Foot:   Skin Integrity: Positive for skin breakdown. Negative for erythema.   Neurological: He is alert and oriented to person, place, and time. A sensory deficit is present.   Skin: Skin is warm and dry. Capillary refill takes 2 to 3 seconds. No rash noted. No erythema. No pallor.        Vitals reviewed.      Significant Labs:  All pertinent labs from the last 24 hours have been reviewed.    Significant Diagnostics:  I have reviewed all pertinent imaging results/findings within the past 24 hours.    Assessment/Plan:     Open wound of left foot    -L foot wound healing s/p debridement and amputation 12/12/18, recent cardiac arrest - agree with Abx, continued wound care per Wound Care RN recs, offloading  -Wound debrided at bedside today with good bleeding 12/18/18 - cont local wound care  -No revascularization indicated at this time  -If pt being discharged, will schedule for close f/u.  If not, will evaluate tomorrow.          Mitch Chan MD  Vascular Surgery  [facility]

## 2018-12-19 NOTE — PT/OT/SLP PROGRESS
Occupational Therapy   Treatment    Name: Marvin Ray  MRN: [id number]  Admitting Diagnosis:  Cardiac arrest       Recommendations:     Discharge Recommendations: nursing facility, skilled  Discharge Equipment Recommendations:  none  Barriers to discharge:  Decreased caregiver support    Subjective   Patient agreeable to therapy.   Pain/Comfort:  · Pain Rating 1: 0/10    Objective:     Communicated with: Nurse prior to session.  Patient found with all lines intact and call button in reach and PICC line, telemetry upon OT entry to room.    General Precautions: Standard, fall   Orthopedic Precautions:LLE non weight bearing   Braces: (Darco shoe to LLE)     Occupational Performance:    Bed Mobility:    · Patient found seated EOB.    Functional Mobility/Transfers:  · Patient completed Bed > Wheelchair Transfer  with contact guard assistance and minimum assistance with slide board and cues for LLE NWB  · Functional Mobility: Patient able to perform sliding board transfer bed>w/c with CGA and verbal cues for LLE NWB. Patient able to manage brakes and propel w/c with BUE and RLE to sink with SBA.    Activities of Daily Living:  · Grooming: modified independence seated at sink in w/c  · Upper Body Dressing: set up assistance to don back gown    · Lower Body Dressing: dependence to don R shoe    [facility] 6 Click ADL: 17    Treatment & Education:  Patient performed bed mobility, functional transfers, and ADL's as above.   Patient educated on LLE NWB precaution. Patient verbalizes understanding.  Patient able to perform BUE therex HEP with green theraband via verbal/written instruction x 15 reps. Patient performed B shoulder abd, B shoulder abd, B shoulder flex, B elbow flex, and B elbow ext between rest.     Patient left up in wheelchair with all lines intact, call button in reach and nurse notified  Education:    Assessment:     Marvin Ray is a 58 y.o. male with a medical diagnosis of Cardiac arrest.  He presents  with the following performance deficits affecting function are weakness, impaired endurance, impaired self care skills, impaired functional mobilty, gait instability, impaired balance, decreased upper extremity function, decreased lower extremity function, decreased safety awareness, edema, impaired skin, impaired cardiopulmonary response to activity. Patient OOB>w/c with CGA and sliding board.  Patient able to perform grooming tasks seated in w/c at sink with mod I. Patient progressing.     Rehab Prognosis:  Good; patient would benefit from acute skilled OT services to address these deficits and reach maximum level of function.       Plan:     Patient to be seen 5 x/week to address the above listed problems via self-care/home management, therapeutic activities, therapeutic exercises  · Plan of Care Expires: 12/29/18  · Plan of Care Reviewed with: patient, sibling    This Plan of care has been discussed with the patient who was involved in its development and understands and is in agreement with the identified goals and treatment plan    GOALS:   Multidisciplinary Problems     Occupational Therapy Goals        Problem: Occupational Therapy Goal    Goal Priority Disciplines Outcome Interventions   Occupational Therapy Goal     OT, PT/OT Ongoing (interventions implemented as appropriate)    Description:  Goals to be met by: 12/29/18    Patient will increase functional independence with ADLs by performing:    UE Dressing with Set-up Assistance.  LE Dressing with Set-up Assistance.  Grooming while standing at sink with Set-up Assistance.  Toileting from bedside commode with Stand-by Assistance for hygiene and clothing management.   Toilet transfer to bedside commode with Supervision.  Upper extremity exercise program with supervision.  Met 12/19                       Time Tracking:     OT Date of Treatment: 12/19/18  OT Start Time: 1505  OT Stop Time: 1537  OT Total Time (min): 32 min    Billable Minutes:Self Care/Home  Management 16  Therapeutic Exercise 16    ÁNGEL Tejada  12/19/2018

## 2018-12-19 NOTE — ASSESSMENT & PLAN NOTE
Patient has severe 3v disease per [facility] in 2016 with no targets for PCI or CABG  Continue asa. Added plavix (discussed with Juan Antonio). Continue lipid management. Hold BB and ACEi for hypotension

## 2018-12-19 NOTE — PT/OT/SLP PROGRESS
Occupational Therapy   Treatment    Name: Marvin Ray  MRN: [id number]  Admitting Diagnosis:  Cardiac arrest       Recommendations:     Discharge Recommendations: nursing facility, skilled  Discharge Equipment Recommendations:  none  Barriers to discharge:  Decreased caregiver support    Subjective     Pain/Comfort:  · Pain Rating 1: 0/10    Objective:     Communicated with: nurse prior to session.  Patient found in the W/C with  PICC line, telemetry upon OT entry to room.    General Precautions: Standard, fall   Orthopedic Precautions:LLE non weight bearing   Braces: (Darco shoe to left foot)     Occupational Performance:    Bed Mobility:    · Patient completed Scooting/Bridging with stand by assistance  · Patient completed Sit to Supine with contact guard assistance and with leg lift     Functional Mobility/Transfers:  · Patient completed Bed <> Chair Transfer using scoot./sliding board technique with contact guard assistance and minimum assistance with slide board. The patient requires CGA/min assist to observe NWB to LLE during transfers.   · Functional Mobility: The patient required mod assist to maneuver and position W/C next to bed to prepare for transfer(2* tight space)    Activities of Daily Living:  · Upper Body Dressing: minimum assistance to doff back gown  · Lower Body Dressing: dependence to don right shoe and left Darco shoe      [facility] 6 Click ADL: 17    Treatment & Education:  The patient was able to perform UE therex using green theraband. The patient perform B shoulder horiz abd, B shoulder abd and B ELBOW FLEX/EXT X15 REPS..     Patient left HOB elevated with all lines intact, call button in reach and nurseSulma notified  Education:    Assessment:     Marvin Ray is a 58 y.o. male with a medical diagnosis of Cardiac arrest.  He presents with the following performance deficits affecting function are weakness, impaired endurance, impaired self care skills, impaired sensation, impaired  balance, gait instability, impaired functional mobilty, decreased lower extremity function, decreased upper extremity function, pain, decreased safety awareness, edema, orthopedic precautions, impaired cardiopulmonary response to activity. The patient is progressing in OT with improved functional mobility and activity tolerance. The patient requires CGA/min assist to observe NWB to LLE during transfers.    Rehab Prognosis:  Good; patient would benefit from acute skilled OT services to address these deficits and reach maximum level of function.       Plan:     Patient to be seen 5 x/week to address the above listed problems via self-care/home management, therapeutic activities, therapeutic exercises  · Plan of Care Expires: 12/29/18  · Plan of Care Reviewed with: patient, sibling    This Plan of care has been discussed with the patient who was involved in its development and understands and is in agreement with the identified goals and treatment plan    GOALS:   Multidisciplinary Problems     Occupational Therapy Goals        Problem: Occupational Therapy Goal    Goal Priority Disciplines Outcome Interventions   Occupational Therapy Goal     OT, PT/OT Ongoing (interventions implemented as appropriate)    Description:  Goals to be met by: 12/29/18    Patient will increase functional independence with ADLs by performing:    UE Dressing with Set-up Assistance.  LE Dressing with Set-up Assistance.  Grooming while standing at sink with Set-up Assistance.  Toileting from bedside commode with Stand-by Assistance for hygiene and clothing management.   Toilet transfer to bedside commode with Supervision.  Upper extremity exercise program with supervision.  Met 12/19                       Time Tracking:     OT Date of Treatment: 12/18/18  OT Start Time: 1509  OT Stop Time: 1534  OT Total Time (min): 25 min    Billable Minutes:Therapeutic Activity 10  Therapeutic Exercise 15  Total Time 25    Celina Cai OT  12/19/2018

## 2018-12-19 NOTE — SUBJECTIVE & OBJECTIVE
Interval History: Reports feeling tired but better overall.    Review of Systems   Constitutional: Positive for fatigue. Negative for fever.   Respiratory: Negative for cough and wheezing.    Cardiovascular: Negative for chest pain and palpitations.   Gastrointestinal: Negative for nausea and vomiting.   Musculoskeletal: Positive for gait problem. Negative for neck stiffness.   Neurological: Negative for seizures and syncope.     Objective:     Vital Signs (Most Recent):  Temp: 97.3 °F (36.3 °C) (12/18/18 1627)  Pulse: 87 (12/18/18 1627)  Resp: 18 (12/18/18 1627)  BP: 116/67 (12/18/18 1627)  SpO2: 96 % (12/18/18 1627) Vital Signs (24h Range):  Temp:  [97.3 °F (36.3 °C)-98.5 °F (36.9 °C)] 97.3 °F (36.3 °C)  Pulse:  [75-87] 87  Resp:  [18-19] 18  SpO2:  [94 %-100 %] 96 %  BP: (101-122)/(55-72) 116/67     Weight: 109.3 kg (240 lb 15.4 oz)  Body mass index is 34.57 kg/m².    Intake/Output Summary (Last 24 hours) at 12/18/2018 1953  Last data filed at 12/18/2018 0847  Gross per 24 hour   Intake --   Output 500 ml   Net -500 ml      Physical Exam   Constitutional: He is oriented to person, place, and time. He appears well-developed and well-nourished. No distress.   HENT:   Head: Normocephalic and atraumatic.   Mouth/Throat: Oropharynx is clear and moist.   Eyes: EOM are normal. Pupils are equal, round, and reactive to light.   Neck: Normal range of motion. Neck supple.   Cardiovascular: Normal rate and normal heart sounds. Exam reveals no gallop and no friction rub.   No murmur heard.  Pulmonary/Chest: Effort normal and breath sounds normal. No stridor. No respiratory distress. He has no wheezes. He has no rales.   Abdominal: Soft. Bowel sounds are normal. He exhibits no mass. There is no tenderness. There is no guarding.   Bandage in RLQ dry and intact, distended but soft   Musculoskeletal: He exhibits edema.   Neurological: He is alert and oriented to person, place, and time.   Skin: He is not diaphoretic.   Bilateral  feet dressed   Nursing note and vitals reviewed.      Significant Labs:   BMP:   Recent Labs   Lab 12/18/18  0508         K 4.2      CO2 29   BUN 34*   CREATININE 0.8   CALCIUM 8.4*     CBC:   Recent Labs   Lab 12/17/18  0600 12/18/18  0508   WBC 7.39 7.09   HGB 8.9* 8.9*   HCT 28.2* 27.6*    318

## 2018-12-19 NOTE — PLAN OF CARE
Problem: Occupational Therapy Goal  Goal: Occupational Therapy Goal  Goals to be met by: 12/29/18    Patient will increase functional independence with ADLs by performing:    UE Dressing with Set-up Assistance.  LE Dressing with Set-up Assistance.  Grooming while standing at sink with Set-up Assistance.  Toileting from bedside commode with Stand-by Assistance for hygiene and clothing management.   Toilet transfer to bedside commode with Supervision.  Upper extremity exercise program with supervision.  Met 12/19     Outcome: Ongoing (interventions implemented as appropriate)  The patient is progressing in OT. The patient tolerated sitting in the chair ~2 hours and was able to perform UE therex using green theraband x15 reps.

## 2018-12-19 NOTE — PROGRESS NOTES
Pt is medically accepted to [facility] all clinicals have been submitted to insurance for authorization from [facility].  Whitley to come to the facility to meet with the pt and complete an onsite assessment on today.  MD advised of this information and will complete orders.    1029- met with pt and sister at bedside to notify that the pt will now be going to LTAC instead of SNF and that Whitley will be coming to hospital to meet with pt and provide information on LTAC. Both pt and sister in agreement with this     1100- orders uploaded to to [facility] and sent to [facility] for review.      1116- call received from Whitley stating that they have received the auth from the insurance company for pt to discharge to [facility]. [place] to work on getting orders over for review to facility via [facility].    1118- call received from [facility] representative stating that they did not realize that the pts insurance plan was an HMO when auth was provided to [facility] that they are an out of network facility and that [facility] facilities are only in network with pts plan.  Also received a call from Whitley who informed [place] that she was going to get with her insurance girl at [facility] to see if they will be willing to do a single case agreement since the pt prefers to stay on the [facility].  Whitley to follow up with [place] when additional information available.    1125- notified pt, pts sister and MD of information from insurance.

## 2018-12-19 NOTE — PLAN OF CARE
12/19/18 1618   Post-Acute Status   Post-Acute Authorization Placement   Post-Acute Placement Status Pending Payor Review  (pending auth from [facility])

## 2018-12-19 NOTE — PROGRESS NOTES
[facility]  Infectious Disease  Progress Note    Patient Name: Marvin Ray  MRN: [id number]  Admission Date: 12/10/2018  Length of Stay: 8 days  Attending Physician: Kirsten Trinh MD  Primary Care Provider: Rubén Davis MD    Isolation Status: Contact  Assessment/Plan:      Diabetic foot infection    - stable on meropenem and vancomycin  - OK to discharge from an ID standpoint on same abx to complete 6 weeks of abx therapy  - needs weekly labs (CBC, CMP, ESR, CRP, vancomycin trough) faxed to ID Clinic at [phone]  - have patient f/u with ID in 2-4 weeks        I will sign off. Please contact us if you have any additional questions.    Jorgito Velez MD  Infectious Disease  [facility]    Subjective:     Principal Problem:Cardiac arrest    HPI:   58M with uncontrolled DM and vascular disease admitted with L foot wound necrosiss including underlying bone.  s/p revascularization on 11/13/2018 including a left SFA, AT and peroneal angioplasty. Wounds again debrided by vascular on 11/29 and 12/5.  when outpatient abx had nearly been arranged, he had a cardiac arrest and ROSC. Now intubated and sedated in ICU. History obtained by wife and chart review. apparantly he has been denying other symptoms such as fever, chills, dysuria. He has been having increased abdominal distention and s/p paracentesis today, results pending.     Most recent vascular surgery 12/5, underwent:  1.  Left foot wound debridement, 25 x 18 x 1 cm.  2.  Left foot wound washout, 25 x 18 x 1 cm.  3.  Left great toe amputation including metatarsal head.         Reviewed echo:   · Severely decreased left ventricular systolic function. The estimated ejection fraction is 20%  · Severe global hypokinetic wall motion. Cannot exclude RWMA.  · Septal wall has abnormal motion. Systolic and diastolic flattening of the interventricular septum consistent with right ventricle pressure and volume  overload.  · Left ventricular diastolic dysfunction.  · Mild right ventricular enlargement.  · Mildly to moderately reduced right ventricular systolic function.  · Moderate tricuspid regurgitation.  · There is a large left pleural effusion    Per chart review, only bacteremia was strep on 10/10 and he appeared to have been treated with 7d of ceftriaxone then meropenem inpatient and got several more days of keflex on discharge.     Reviewed cultures:    Collected: 10/10/18 1247   Order Status: Completed Specimen: Blood from Peripheral, Antecubital, Left Updated: 10/13/18 0842    Blood Culture, Routine Gram stain yudelka bottle: Gram positive cocci in chains resembling Strep     Blood Culture, Routine Results called to and read back by: Chloé To    Blood Culture, Routine Gram stain aer bottle: Gram positive cocci in chains resembling Strep     Blood Culture, Routine Positive results previously called 10/11/2018  18:47    Blood Culture, Routine --    GROUP G STREPTOCOCCUS   Beta-hemolytic streptococci are routinely susceptible to   penicillins,cephalosporins and carbapenems.    Susceptibility      Group g streptococcus     CULTURE, BLOOD     Amox/K Clav'ate <=0.5/.25       Ampicillin <=0.06  Sensitive     Azithromycin <=0.25  Sensitive     Cefepime <=0.25  Sensitive     Cefotaxime <=0.25  Sensitive     Ceftriaxone <=0.25  Sensitive     Chloramphenicol 4  Sensitive     Clindamycin <=0.06  Sensitive     Erythromycin <=0.06  Sensitive     Meropenem <=0.06       Penicillin <=0.03  Sensitive     Tetracycline <=0.5  Sensitive     Trimeth/Sulfa <=.25/4.7       Vancomycin 0.5  Sensitive            2nd - 5th toe left foot   Susceptibility      Acinetobacter baumannii/haemolyticus Enterococcus faecalis     CULTURE, AEROBIC  (SPECIFY SOURCE) CULTURE, AEROBIC  (SPECIFY SOURCE)     Amikacin <=16  Sensitive       Amp/Sulbactam >16/8  Resistant       Ampicillin   <=2  Sensitive     Cefepime <=8  Sensitive       Ceftriaxone 32   Intermediate       Ciprofloxacin <=1  Sensitive       Gentamicin <=4  Sensitive       Gentamicin Synergy Screen   <=500  Sensitive     Meropenem <=4  Sensitive       Tetracycline <=4  Sensitive <=4  Sensitive     Tobramycin <=4  Sensitive       Trimeth/Sulfa >2/38  Resistant       Vancomycin   2  Sensitive              Linear View         Fungus culture [[id number]] Collected: 11/16/18 0838   Order Status: Completed Specimen: Bone from Foot, Left Updated: 12/03/18 1352    Fungus (Mycology) Culture FUSARIUM SPECIES   Narrative:     2nd - 5th toe left foot     Narrative:     2nd -5th toe left foot   Susceptibility      Acinetobacter baumannii/haemolyticus     CULTURE, AEROBIC  (SPECIFY SOURCE)     Amikacin <=16  Sensitive     Amp/Sulbactam >16/8  Resistant     Cefepime >16  Resistant     Ceftriaxone >32  Resistant     Ciprofloxacin <=1  Sensitive     Gentamicin <=4  Sensitive     Meropenem <=4  Sensitive     Tetracycline <=4  Sensitive     Tobramycin <=4  Sensitive     Trimeth/Sulfa >2/38  Resistant             Acinetobacter baumannii/haemolyticus     CULTURE, TISSUE     Amikacin <=16  Sensitive     Amp/Sulbactam 16/8  Intermediate     Cefepime 16  Intermediate     Ceftriaxone 32  Intermediate     Ciprofloxacin >2  Resistant     Gentamicin 8  Intermediate     Meropenem <=4  Sensitive     Tetracycline 8  Intermediate     Tobramycin <=4  Sensitive     Trimeth/Sulfa >2/38  Resistant          Interval History: Events noted. No complaints. Tolerating trini and vancomycin. Denies fever or chills.     Review of Systems   Constitutional: Negative for chills and fever.   All other systems reviewed and are negative.    Objective:     Vital Signs (Most Recent):  Temp: 97.7 °F (36.5 °C) (12/19/18 0800)  Pulse: 85 (12/19/18 0800)  Resp: 20 (12/19/18 0800)  BP: 126/69 (12/19/18 0800)  SpO2: (!) 93 % (12/19/18 0800) Vital Signs (24h Range):  Temp:  [97.3 °F (36.3 °C)-98.7 °F (37.1 °C)] 97.7 °F (36.5 °C)  Pulse:  [75-87] 85  Resp:   [18-22] 20  SpO2:  [93 %-97 %] 93 %  BP: (104-126)/(58-69) 126/69     Weight: 107.3 kg (236 lb 8.9 oz)  Body mass index is 33.94 kg/m².    Estimated Creatinine Clearance: 120.4 mL/min (based on SCr of 0.8 mg/dL).    Physical Exam   Constitutional: He is oriented to person, place, and time. He appears well-developed and well-nourished. No distress.   HENT:   Head: Normocephalic and atraumatic.   Right Ear: External ear normal.   Left Ear: External ear normal.   Mouth/Throat: Oropharynx is clear and moist.   Eyes: Conjunctivae and EOM are normal. Pupils are equal, round, and reactive to light.   Cardiovascular: Normal rate and normal heart sounds.   Pulmonary/Chest: Effort normal and breath sounds normal. No stridor. No respiratory distress.   Abdominal: Soft. Bowel sounds are normal. He exhibits no distension. There is no tenderness.   Musculoskeletal: Normal range of motion. He exhibits deformity.   dressed   Neurological: He is alert and oriented to person, place, and time.   Skin: He is not diaphoretic.   Psychiatric: He has a normal mood and affect. His behavior is normal. Judgment and thought content normal.   Nursing note and vitals reviewed.      Significant Labs:   Blood Culture:   Recent Labs   Lab 10/10/18  1247 11/08/18  1817 11/08/18  1820 12/11/18  1630 12/11/18  1631   LABBLOO No growth after 5 days.  Gram stain yudelka bottle: Gram positive cocci in chains resembling Strep   Results called to and read back by: Chloé To  Gram stain aer bottle: Gram positive cocci in chains resembling Strep   Positive results previously called 10/11/2018  18:47  GROUP G STREPTOCOCCUS  Beta-hemolytic streptococci are routinely susceptible to   penicillins,cephalosporins and carbapenems.   No growth after 5 days. No growth after 5 days. No growth after 5 days. No growth after 5 days.     Urine Culture: No results for input(s): LABURIN in the last 4320 hours.  Wound Culture:   Recent Labs   Lab 11/14/18  1338  11/16/18  0838 11/16/18  0848 11/16/18  0910 12/12/18  1555   LABAERO DIPHTHEROIDS  Few  No further workup.   ACINETOBACTER BAUMANNII/HAEMOLYTICUS  Moderate    ENTEROCOCCUS FAECALIS  Few    STREPTOCOCCUS GROUP G  Rare  Beta-hemolytic streptococci are routinely susceptible to   penicillins,cephalosporins and carbapenems.   ACINETOBACTER BAUMANNII/HAEMOLYTICUS  Moderate    STREPTOCOCCUS GROUP G  Few  Beta-hemolytic streptococci are routinely susceptible to   penicillins,cephalosporins and carbapenems.   ACINETOBACTER BAUMANNII/HAEMOLYTICUS  Moderate   No growth       Significant Imaging: I have reviewed all pertinent imaging results/findings within the past 24 hours.

## 2018-12-19 NOTE — ASSESSMENT & PLAN NOTE
Intubated 12/11 post code  Ascites and bilateral effusions cause decreased lung compliance-- paracentesis 12/12 with 2.4L off  Extubated to BiPAP on 12/13  Weaned to NC, likely can be weaned further to room air

## 2018-12-19 NOTE — ASSESSMENT & PLAN NOTE
He has been counseled on advanced directives. No decisions made yet. He is still legally  but not in contact with wife- needs HCPOA documentation prior to discharge.

## 2018-12-19 NOTE — SUBJECTIVE & OBJECTIVE
Medications:  Continuous Infusions:  Scheduled Meds:   [START ON 12/20/2018] allopurinol  300 mg Oral Daily    aspirin  81 mg Oral Daily    brimonidine 0.1%  1 drop Both Eyes BID    carvedilol  3.125 mg Oral BID    clopidogrel  75 mg Oral Daily    collagenase   Topical (Top) Daily    dorzolamide-timolol 2-0.5%  1 drop Both Eyes TID    enoxaparin  40 mg Subcutaneous Daily    ezetimibe  10 mg Oral Daily    furosemide  40 mg Oral BID    insulin aspart U-100  3 Units Subcutaneous TIDWM    insulin detemir U-100  10 Units Subcutaneous QHS    meropenem (MERREM) IVPB  2 g Intravenous Q8H    miconazole NITRATE 2 %   Topical (Top) BID    omega 3-dha-epa-fish oil  1 capsule Oral BID    prednisoLONE acetate  1 drop Right Eye Daily    senna-docusate 8.6-50 mg  1 tablet Oral BID    sodium chloride 0.9%  10 mL Intravenous Q6H    vancomycin (VANCOCIN) IVPB  1,250 mg Intravenous Q24H     PRN Meds:acetaminophen, dextrose 50%, dextrose 50%, glucagon (human recombinant), glucose, glucose, insulin aspart U-100, ondansetron, oxyCODONE-acetaminophen, ramelteon, Flushing PICC Protocol **AND** sodium chloride 0.9% **AND** sodium chloride 0.9%     Objective:     Vital Signs (Most Recent):  Temp: 97.7 °F (36.5 °C) (12/19/18 1125)  Pulse: 74 (12/19/18 1125)  Resp: 18 (12/19/18 1125)  BP: (!) 124/58 (12/19/18 1125)  SpO2: (!) 93 % (12/19/18 1125) Vital Signs (24h Range):  Temp:  [97.3 °F (36.3 °C)-98.7 °F (37.1 °C)] 97.7 °F (36.5 °C)  Pulse:  [74-87] 74  Resp:  [18-22] 18  SpO2:  [93 %-97 %] 93 %  BP: (110-126)/(58-69) 124/58     Date 12/19/18 0700 - 12/20/18 0659   Shift 0346-2686 5886-1487 3812-9631 24 Hour Total   INTAKE   P.O. 480   480   IV Piggyback 350   350   Shift Total(mL/kg) 830(7.7)   830(7.7)   OUTPUT   Urine(mL/kg/hr) 725(0.8)   725   Shift Total(mL/kg) 725(6.8)   725(6.8)   Weight (kg) 107.3 107.3 107.3 107.3       Physical Exam   Constitutional: He is oriented to person, place, and time. He appears  well-developed and well-nourished. No distress.   HENT:   Head: Normocephalic and atraumatic.   Eyes: Conjunctivae are normal.   Neck: Neck supple.   Cardiovascular: Normal rate.   Pulses:       Dorsalis pedis pulses are Detected w/ doppler on the right side, and Detected w/ doppler on the left side.        Posterior tibial pulses are Detected w/ doppler on the right side, and Detected w/ doppler on the left side.   Pulmonary/Chest: Effort normal. No respiratory distress.   Abdominal: Soft. He exhibits no distension and no mass. There is no tenderness. There is no rebound and no guarding.   Musculoskeletal: Normal range of motion. He exhibits no edema, tenderness or deformity.   Feet:   Left Foot:   Skin Integrity: Positive for skin breakdown. Negative for erythema.   Neurological: He is alert and oriented to person, place, and time. A sensory deficit is present.   Skin: Skin is warm and dry. Capillary refill takes 2 to 3 seconds. No rash noted. No erythema. No pallor.        Vitals reviewed.      Significant Labs:  All pertinent labs from the last 24 hours have been reviewed.    Significant Diagnostics:  I have reviewed all pertinent imaging results/findings within the past 24 hours.

## 2018-12-19 NOTE — PLAN OF CARE
[facility]  [address] [place] [address]    Facility Transfer Orders                        12/19/2018    Admit to: LTAC    Diagnoses:  Active Hospital Problems    Diagnosis  POA    *Cardiac arrest [I46.9]  No    Goals of care, counseling/discussion [Z71.89]  Not Applicable    Acute renal insufficiency [N28.9]  No    Elevated troponin [R74.8]  Yes    Other ascites [R18.8]  Yes    Severe malnutrition [E43]  Yes    Diabetic foot infection [E11.628, L08.9]  Yes    Open wound of left foot [S91.302A]  Yes    Chronic combined systolic and diastolic heart failure [I50.42]  Yes     Chronic    PVD (peripheral vascular disease) [I73.9]  Yes     Chronic    CAD (coronary artery disease) [I25.10]  Yes     Chronic    Essential hypertension [I10]  Yes     Chronic    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]  Yes     Chronic    Neovascular glaucoma of both eyes [H40.53X0]  Yes     Chronic    DM type 2 with diabetic peripheral neuropathy [E11.42]  Yes     Chronic    HLD (hyperlipidemia) [E78.5]  Yes     Chronic      Resolved Hospital Problems    Diagnosis Date Resolved POA    Acute hypoxemic respiratory failure [J96.01] 12/18/2018 No    Shock [R57.9] 12/18/2018 No    LAYNE (acute kidney injury) [N17.9] 12/18/2018 Yes       Allergies:  Review of patient's allergies indicates:   Allergen Reactions    Statins-hmg-coa reductase inhibitors      Generalized Pain    Onglyza [saxagliptin]     Penicillins Rash       Vitals:  Every shift (Skilled Nursing patients)    Diet: 2000 chris ADA diet, Glucerna with meals    Activity:      - Up in a chair each morning as tolerated   - Ambulate with assistance to bathroom    Nursing Precautions:     - Aspiration precautions:             -  Upright 90 degrees befor during and after meals       - Fall precautions   - Seizure precaution   - Decubitus precautions:        -  for positioning   - Pressure reducing foam mattress   - Turn patient every two hours. Use  "wedge pillows to anchor patient      CONSULTS:      PT to evaluate and treat - five times a week     OT to evaluate and treat - five times a week     Nutrition to evaluate and recommend diet      WOUND CARE:  Local wound care to left foot daily- Cleanse wound with NS. Apply Santyl 1/8" coat to necrotic tissue. Cover with NS moistened gauze then dry gauze. Secure dressing with Kerlix roll. Keep left foot suspended off bed in Z flex boot.      LABS:  SN to perform labs weekly every Monday: CBC, CMP, ESR, CRP, vancomycin trough  Fax lab results to ID Clinic at [phone]      INFUSION THERAPY:   SN to perform Infusion Therapy/Central Line Care.  Review Central Line Care & Central Line Flush with patient.    Administer (drug and dose): Meropenem 2g IV every 8 (eight) hours and vancomycin 1,250mg IV once daily  Last dose given: 12/19/2018                     Home dose due: 12/20/2018    Scrub the Hub: Prior to accessing the line, always perform a 30 second alcohol scrub  Each lumen of the central line is to be flushed at least daily with 10 mL Normal Saline and 3 mL Heparin flush (100 units/mL)  Skilled Nurse (SN) may draw blood from IV access  Blood Draw Procedure:   - Aspirate at least 5 mL of blood   - Discard   - Obtain specimen   - Change posiflow cap   - Flush with 20 mL Normal Saline followed by a                 3-5 mL Heparin flush (100 units/mL)  Central :   - Sterile dressing changes are done weekly and as needed.   - Use chlor-hexadine scrub to cleanse site, apply Biopatch to insertion site,       apply securement device dressing   - Posi-flow caps are changed weekly and after EVERY lab draw.   - If sterile gauze is under dressing to control oozing,                 dressing change must be performed every 24 hours until gauze is not needed.  - SN to remove PICC once IV antibiotics completed.      DIABETES CARE:      Check blood sugar:      Fingerstick blood sugar AC and HS   Fingerstick blood " sugar every 6 hours if unable to eat      Report CBG < 60 or > 400 to physician.                                          Insulin Sliding Scale          Glucose  Novolog Insulin Subcutaneous        0 - 60   Orange juice or glucose tablet, hold insulin      No insulin   201-250  2 units   251-300  4 units   301-350  6 units   351-400  8 units   >400   10 units then call physician      Medications: Discontinue all previous medication orders, if any. See new list below.     Marvin Ray   Home Medication Instructions DEIRDRE:[id number]    Printed on:12/19/18 1040   Medication Information                      allopurinol (ZYLOPRIM) 100 MG tablet  Take 3 tablets (300 mg total) by mouth once daily.             aspirin 81 MG Chew  Take 81 mg by mouth once daily.             brimonidine 0.1% (ALPHAGAN P) 0.1 % Drop  Place 1 drop into both eyes 2 (two) times daily.             carvedilol (COREG) 3.125 MG tablet  Take 1 tablet (3.125 mg total) by mouth 2 (two) times daily.             clopidogrel (PLAVIX) 75 mg tablet  Take 1 tablet (75 mg total) by mouth once daily.             coenzyme Q10 100 mg capsule  Take 100 mg by mouth every morning.             collagenase (SANTYL) ointment  Apply topically once daily to foot wound.             dextrose 5 % SolP 250 mL with vancomycin 1,000 mg SolR 1,250 mg  Inject 1,250 mg into the vein once daily.  Until 1/22/2019           dorzolamide-timolol 2-0.5% (COSOPT) 22.3-6.8 mg/mL ophthalmic solution  Place 1 drop into both eyes 3 (three) times daily.             ezetimibe (ZETIA) 10 mg tablet  Take 1 tablet (10 mg total) by mouth once daily.             fish oil-omega-3 fatty acids 300-1,000 mg capsule  Take 2 capsules (2 g total) by mouth 2 (two) times daily.             furosemide (LASIX) 40 MG tablet  Take 1 tablet (40 mg total) by mouth twice daily.             insulin aspart U-100 (NOVOLOG) 100 unit/mL InPn pen  Inject 3 Units into the skin 3 (three) times daily.           "   insulin detemir U-100 (LEVEMIR FLEXTOUCH) 100 unit/mL (3 mL) SubQ InPn pen  Inject 10 Units into the skin every evening.             miconazole NITRATE 2 % (MICOTIN) 2 % top powder  Apply topically 2 (two) times daily.             MULTIVIT,THER IRON,CA,FA & MIN (MULTIVITAMIN) Tab  Take 1 tablet by mouth every morning.              oxyCODONE-acetaminophen (PERCOCET) 5-325 mg per tablet  Take 1 tablet by mouth every 6 (six) hours as needed for severe pain.              pen needle, diabetic 31 gauge x 1/4" Ndle  Use as directed             prednisoLONE acetate (PRED FORTE) 1 % DrpS  Place 1 drop into the right eye once daily.              senna-docusate 8.6-50 mg (PERICOLACE) 8.6-50 mg per tablet  Take 1 tablet by mouth 2 (two) times daily.             sodium chloride 0.9% SolP 100 mL with meropenem 1 gram SolR 2 g  Inject 2 g into the vein every 8 (eight) hours.  Until 1/22/2019                     _________________________________  Kirsten Trinh MD  12/19/2018    "

## 2018-12-19 NOTE — PLAN OF CARE
Problem: Occupational Therapy Goal  Goal: Occupational Therapy Goal  Goals to be met by: 12/29/18    Patient will increase functional independence with ADLs by performing:    UE Dressing with Set-up Assistance.  LE Dressing with Set-up Assistance.  Grooming while standing at sink with Set-up Assistance.  Toileting from bedside commode with Stand-by Assistance for hygiene and clothing management.   Toilet transfer to bedside commode with Supervision.  Upper extremity exercise program with supervision.  Met 12/19      Outcome: Ongoing (interventions implemented as appropriate)  Patient OOB>w/c with CGA-Min A and sliding board.  Patient able to perform grooming tasks seated in w/c at sink with mod I. Patient progressing and will benefit from continued OT to address functional deficits

## 2018-12-20 VITALS
DIASTOLIC BLOOD PRESSURE: 71 MMHG | HEART RATE: 69 BPM | WEIGHT: 315 LBS | TEMPERATURE: 99 F | BODY MASS INDEX: 45.1 KG/M2 | HEIGHT: 70 IN | SYSTOLIC BLOOD PRESSURE: 130 MMHG | RESPIRATION RATE: 16 BRPM | OXYGEN SATURATION: 95 %

## 2018-12-20 PROBLEM — I50.43 ACUTE ON CHRONIC COMBINED SYSTOLIC AND DIASTOLIC HEART FAILURE: Status: ACTIVE | Noted: 2018-10-16

## 2018-12-20 PROBLEM — Z16.24 MDR ACINETOBACTER BAUMANNII INFECTION: Status: ACTIVE | Noted: 2018-12-20

## 2018-12-20 PROBLEM — A49.8 MDR ACINETOBACTER BAUMANNII INFECTION: Status: ACTIVE | Noted: 2018-12-20

## 2018-12-20 PROBLEM — L30.4 INTERTRIGINOUS DERMATITIS ASSOCIATED WITH MOISTURE: Status: ACTIVE | Noted: 2018-12-20

## 2018-12-20 PROBLEM — Z78.9 TAKES DIETARY SUPPLEMENTS: Status: ACTIVE | Noted: 2018-12-20

## 2018-12-20 PROBLEM — R23.9 ALTERATION IN SKIN INTEGRITY: Status: ACTIVE | Noted: 2018-12-20

## 2018-12-20 LAB
ANION GAP SERPL CALC-SCNC: 6 MMOL/L
BASOPHILS # BLD AUTO: 0.03 K/UL
BASOPHILS NFR BLD: 0.5 %
BUN SERPL-MCNC: 35 MG/DL
CALCIUM SERPL-MCNC: 8.3 MG/DL
CHLORIDE SERPL-SCNC: 101 MMOL/L
CO2 SERPL-SCNC: 31 MMOL/L
CREAT SERPL-MCNC: 0.7 MG/DL
DIFFERENTIAL METHOD: ABNORMAL
EOSINOPHIL # BLD AUTO: 0.3 K/UL
EOSINOPHIL NFR BLD: 4 %
ERYTHROCYTE [DISTWIDTH] IN BLOOD BY AUTOMATED COUNT: 15.9 %
EST. GFR  (AFRICAN AMERICAN): >60 ML/MIN/1.73 M^2
EST. GFR  (NON AFRICAN AMERICAN): >60 ML/MIN/1.73 M^2
GLUCOSE SERPL-MCNC: 149 MG/DL
HCT VFR BLD AUTO: 27.8 %
HGB BLD-MCNC: 8.8 G/DL
LYMPHOCYTES # BLD AUTO: 0.6 K/UL
LYMPHOCYTES NFR BLD: 9.5 %
MCH RBC QN AUTO: 29.9 PG
MCHC RBC AUTO-ENTMCNC: 31.7 G/DL
MCV RBC AUTO: 95 FL
MONOCYTES # BLD AUTO: 0.7 K/UL
MONOCYTES NFR BLD: 11.4 %
NEUTROPHILS # BLD AUTO: 4.8 K/UL
NEUTROPHILS NFR BLD: 74.6 %
PLATELET # BLD AUTO: 330 K/UL
PMV BLD AUTO: 9.2 FL
POCT GLUCOSE: 135 MG/DL (ref 70–110)
POCT GLUCOSE: 164 MG/DL (ref 70–110)
POTASSIUM SERPL-SCNC: 4.3 MMOL/L
RBC # BLD AUTO: 2.94 M/UL
SODIUM SERPL-SCNC: 138 MMOL/L
WBC # BLD AUTO: 6.42 K/UL

## 2018-12-20 PROCEDURE — 85025 COMPLETE CBC W/AUTO DIFF WBC: CPT

## 2018-12-20 PROCEDURE — 25000003 PHARM REV CODE 250: Performed by: HOSPITALIST

## 2018-12-20 PROCEDURE — A4216 STERILE WATER/SALINE, 10 ML: HCPCS | Performed by: INTERNAL MEDICINE

## 2018-12-20 PROCEDURE — 63600175 PHARM REV CODE 636 W HCPCS: Performed by: NURSE PRACTITIONER

## 2018-12-20 PROCEDURE — 25000003 PHARM REV CODE 250: Performed by: INTERNAL MEDICINE

## 2018-12-20 PROCEDURE — 25000003 PHARM REV CODE 250: Performed by: NURSE PRACTITIONER

## 2018-12-20 PROCEDURE — 63600175 PHARM REV CODE 636 W HCPCS: Performed by: INTERNAL MEDICINE

## 2018-12-20 PROCEDURE — 80048 BASIC METABOLIC PNL TOTAL CA: CPT

## 2018-12-20 RX ADMIN — CARVEDILOL 3.12 MG: 3.12 TABLET, FILM COATED ORAL at 10:12

## 2018-12-20 RX ADMIN — DORZOLAMIDE HYDROCHLORIDE AND TIMOLOL MALEATE 1 DROP: 20; 5 SOLUTION OPHTHALMIC at 10:12

## 2018-12-20 RX ADMIN — MEROPENEM 2 G: 1 INJECTION, POWDER, FOR SOLUTION INTRAVENOUS at 02:12

## 2018-12-20 RX ADMIN — CLOPIDOGREL BISULFATE 75 MG: 75 TABLET ORAL at 10:12

## 2018-12-20 RX ADMIN — BRIMONIDINE TARTRATE 1 DROP: 1 SOLUTION/ DROPS OPHTHALMIC at 10:12

## 2018-12-20 RX ADMIN — PREDNISOLONE ACETATE 1 DROP: 10 SUSPENSION/ DROPS OPHTHALMIC at 10:12

## 2018-12-20 RX ADMIN — OXYCODONE AND ACETAMINOPHEN 1 TABLET: 5; 325 TABLET ORAL at 12:12

## 2018-12-20 RX ADMIN — Medication: at 10:12

## 2018-12-20 RX ADMIN — INSULIN ASPART 2 UNITS: 100 INJECTION, SOLUTION INTRAVENOUS; SUBCUTANEOUS at 01:12

## 2018-12-20 RX ADMIN — COLLAGENASE SANTYL: 250 OINTMENT TOPICAL at 10:12

## 2018-12-20 RX ADMIN — Medication 10 ML: at 07:12

## 2018-12-20 RX ADMIN — INSULIN ASPART 3 UNITS: 100 INJECTION, SOLUTION INTRAVENOUS; SUBCUTANEOUS at 10:12

## 2018-12-20 RX ADMIN — INSULIN ASPART 3 UNITS: 100 INJECTION, SOLUTION INTRAVENOUS; SUBCUTANEOUS at 01:12

## 2018-12-20 RX ADMIN — Medication 10 ML: at 02:12

## 2018-12-20 RX ADMIN — STANDARDIZED SENNA CONCENTRATE AND DOCUSATE SODIUM 1 TABLET: 8.6; 5 TABLET, FILM COATED ORAL at 10:12

## 2018-12-20 RX ADMIN — Medication 10 ML: at 12:12

## 2018-12-20 RX ADMIN — FUROSEMIDE 40 MG: 40 TABLET ORAL at 10:12

## 2018-12-20 RX ADMIN — EZETIMIBE 10 MG: 10 TABLET ORAL at 10:12

## 2018-12-20 RX ADMIN — ALLOPURINOL 300 MG: 100 TABLET ORAL at 10:12

## 2018-12-20 RX ADMIN — ASPIRIN 81 MG 81 MG: 81 TABLET ORAL at 10:12

## 2018-12-20 RX ADMIN — VANCOMYCIN HYDROCHLORIDE 1250 MG: 1 INJECTION, POWDER, LYOPHILIZED, FOR SOLUTION INTRAVENOUS at 09:12

## 2018-12-20 RX ADMIN — MEROPENEM 2 G: 1 INJECTION, POWDER, FOR SOLUTION INTRAVENOUS at 10:12

## 2018-12-20 RX ADMIN — OMEGA-3 FATTY ACIDS CAP 1000 MG 1 CAPSULE: 1000 CAP at 10:12

## 2018-12-20 NOTE — PROGRESS NOTES
[facility]  Progress Note    Patient Name: Marvin Ray  MRN: [id number]  Patient Class: IP- Inpatient   Admission Date: 12/10/2018  Length of Stay: 8 days  Attending Physician: Kirsten Trinh MD  Primary Care Provider: Rubén Davis MD        Subjective:     Principal Problem:Cardiac arrest    HPI:  58 y.o. male with DM2, HLD, HTN, CAD, chronic combined heart failure, CKD stage III presents with a complaint of left foot wound infection.  Prior cultures show only sensitive to meropenem.  Outpatient IV antibiotics were attempted to be setup last night and throughout the day today through his PCP but were unable to be procured.  He denies fever, chills, cough, SOB, chest pain, palpitations, headaches, vision changes, N/V/D, abdominal pain, or dysuria.  Placed in observation for PICC line placement and to setup home IV abx.    Hospital Course:  Patient admitted for PICC line placement for IV antibiotics for left wound infection -12/5/18 wound culture Acinetobacter baumannii/haemolyticus sensitive to meropenem. IV meropenum started in ED. On 12/11/18, he suffered a cardiac arrest with ROSC after ACLS. Per his sister, he was standing getting ready to leave the hospital and then fell back into the bed. He was intubated during the code and transferred to ICU. He was hypotensive after the arrest requiring Levophed. Cardiology and Pulmonary consulted. ICD interrogated- no events, no shocks. Suspect PEA arrest due to severe infection with underlying biventricular failure and severe CAD. TTE with EF 30%, diastolic dysfunction, new RV changes. CT negative for PE. Troponins elevated at 0.5 but downtrended. Initially started ASA, Plavix, and full dose Lovenox with concern for NSTEMI, but unlikely NSTEMI given minimal troponin elevations that could be explained by chest compressions alone. Lovenox discontinued. Continue DAPT given severe CAD with no intervention (not a candidate for  PCI or CABG given anatomy). Blood cultures checked and NGTD. Wound care following for DM foot wounds. Abdominal US with ascites s/p paracentesis with 2.4L of clear light green ascites removed on 12/12. SAAG 0.6 which is NOT consistent with portal hypertension. Total protein 4.2 which would be consistent with cardiac etiology. No SBP. No pancreatitis on CT abd/pelvis and none clinically either. UA and UPC did not show protein; ascites not from nephrotic syndrome. Cytology and AFB culture and smear on ascitic fluid pending. He was extubated to BiPAP on 12/13 and weaned to nasal cannula. Levophed off since 12/14. He continues to improve. He was stable for step down to floor on 12/15.  PT/OT recommending SNF vs LTAC. SW arranging.     Interval History: Arranging for discharge to LTAC.    Review of Systems   Constitutional: Positive for fatigue. Negative for fever.   Respiratory: Negative for cough and wheezing.    Cardiovascular: Negative for chest pain and palpitations.   Gastrointestinal: Negative for nausea and vomiting.   Musculoskeletal: Positive for gait problem. Negative for neck stiffness.   Neurological: Negative for seizures and syncope.     Objective:     Vital Signs (Most Recent):  Temp: 98.1 °F (36.7 °C) (12/19/18 1942)  Pulse: 81 (12/19/18 1942)  Resp: 19 (12/19/18 1942)  BP: 127/71 (12/19/18 1942)  SpO2: 97 % (12/19/18 1942) Vital Signs (24h Range):  Temp:  [97.7 °F (36.5 °C)-98.7 °F (37.1 °C)] 98.1 °F (36.7 °C)  Pulse:  [74-85] 81  Resp:  [18-22] 19  SpO2:  [93 %-97 %] 97 %  BP: (110-127)/(58-71) 127/71     Weight: 107.3 kg (236 lb 8.9 oz)  Body mass index is 33.94 kg/m².    Intake/Output Summary (Last 24 hours) at 12/19/2018 2014  Last data filed at 12/19/2018 1855  Gross per 24 hour   Intake 830 ml   Output 1475 ml   Net -645 ml      Physical Exam   Constitutional: He is oriented to person, place, and time. He appears well-developed and well-nourished. No distress.   HENT:   Head: Normocephalic and  atraumatic.   Mouth/Throat: Oropharynx is clear and moist.   Eyes: EOM are normal. Pupils are equal, round, and reactive to light.   Neck: Normal range of motion. Neck supple.   Cardiovascular: Normal rate and normal heart sounds. Exam reveals no gallop and no friction rub.   No murmur heard.  Pulmonary/Chest: Effort normal and breath sounds normal. No stridor. No respiratory distress. He has no wheezes. He has no rales.   Abdominal: Soft. Bowel sounds are normal. He exhibits no mass. There is no tenderness. There is no guarding.   Bandage in RLQ dry and intact, distended but soft   Musculoskeletal: He exhibits edema.   Neurological: He is alert and oriented to person, place, and time.   Skin: He is not diaphoretic.   Bilateral feet dressed   Nursing note and vitals reviewed.      Significant Labs:   BMP:   Recent Labs   Lab 12/19/18  0224   *      K 4.5      CO2 29   BUN 38*   CREATININE 0.8   CALCIUM 8.3*     CBC:   Recent Labs   Lab 12/18/18  0508 12/19/18  0224   WBC 7.09 7.51   HGB 8.9* 8.7*   HCT 27.6* 27.2*    320     Assessment/Plan:      * Cardiac arrest    On 12/11  PEA? ICD interrogated with no shocks or abnormal rhythms.  Etiology unclear. No PE, no NSTEMI, no electrolyte abnormalities, no hypoxia, no toxins  May be PEA arrest due to severe infection in setting of biventricular cardiomyopathy and severe CAD  Now stable on Telemetry.  PT/OT consulted and recommending SNF vs LTAC.  SW consulted.     Goals of care, counseling/discussion    He has been counseled on advanced directives. No decisions made yet. He is still legally  but not in contact with wife- needs HCPOA.     Severe malnutrition    Appreciate nutrition recs. Albumin 1.7.     Other ascites    Abdominal US with ascites  Previous paracentesis in 10/2018 with SAAG 0.9, not consistent with portal hypertension  Repeat paracentesis 12/12 with light green clear fluid. SAAG 0.6. Total protein 4.2.  with 196  PMNs, not consistent with SBP.   Ascites cytology and AFB culture and smear pending  UA negative for protein, UPC not consistent with nephrotic syndrome  Not clinically or radiologically consistent with pancreatitis  GI consulted in 10/2018 with prior paracentesis and suggested it could be due to CHF. Total protein is consistent with this but SAAG is not. Albumin is also dismally low and he does have generalized edema.       Diabetic foot infection    Tissue culture obtained by Vascular Surgery obtained 12/5/18 grew Acinetobacter baumannii/haemolyticus sensitive to meropenem.    ID consulted- recommend meropenem and vanc x6 weeks   PICC in place R arm for outpatient antibiotics  Wound care following  ESR 72, CRP 89 on 12/11- trend weekly  Poor wound healing likely given PAD and DM with extensive wound. Discussed with patient on 12/14  Will need ID, Vascular, Wound care follow up after discharge     Open wound of left foot    As above     Chronic combined systolic and diastolic heart failure    No evidence of decompensation on admit  Post code BNP elevated, though this is expected  Patient has ascites and bilateral pleural effusions. Does not have significant pulmonary edema  Will change lasix to 40mg IV daily- patient has good UOP and improving Cr with this regimen  Likely will need to add spironolactone for ascites and CHF when tolerated by BP  Monitor UOP    TTE 12/11  · Severely decreased left ventricular systolic function. The estimated ejection fraction is 20%  · Severe global hypokinetic wall motion. Cannot exclude RWMA.  · Septal wall has abnormal motion. Systolic and diastolic flattening of the interventricular septum consistent with right ventricle pressure and volume overload.  · Left ventricular diastolic dysfunction.  · Mild right ventricular enlargement.  · Mildly to moderately reduced right ventricular systolic function.  · Moderate tricuspid regurgitation.  · There is a large left pleural effusion.      PVD (peripheral vascular disease)    Continue asa, plavix, lipid reduction meds     CAD (coronary artery disease)    Patient has severe 3v disease per [facility] in 2016 with no targets for PCI or CABG  Continue asa. Added plavix (discussed with Juan Antonio). Continue lipid management. Hold BB and ACEi for hypotension       Essential hypertension    Hold all BP meds for now  Currently normotensive off BP meds.     Tophaceous gout    Continue home allopurinol       Neovascular glaucoma of both eyes    Continue eye drops     HLD (hyperlipidemia)    Last lipids 10/2018 are well controlled  Continue home regimen of ezetimibe and fish oil (statin intolerance)     DM type 2 with diabetic peripheral neuropathy    Last HgbA1c   Lab Results   Component Value Date    HGBA1C 8.3 (H) 10/30/2018   Hold oral antihyperglycemics while inpatient    Meds: detemir 5 + SSI  ACHS accuchecks, ADA diet, hypoglycemic protocol          VTE Risk Mitigation (From admission, onward)        Ordered     enoxaparin injection 40 mg  Daily      12/12/18 0820     IP VTE HIGH RISK PATIENT  Once      12/10/18 7090              Kirsten Trinh MD  Department of Hospital Medicine   [facility]

## 2018-12-20 NOTE — SUBJECTIVE & OBJECTIVE
Interval History: Arranging for discharge to LTAC.    Review of Systems   Constitutional: Positive for fatigue. Negative for fever.   Respiratory: Negative for cough and wheezing.    Cardiovascular: Negative for chest pain and palpitations.   Gastrointestinal: Negative for nausea and vomiting.   Musculoskeletal: Positive for gait problem. Negative for neck stiffness.   Neurological: Negative for seizures and syncope.     Objective:     Vital Signs (Most Recent):  Temp: 98.1 °F (36.7 °C) (12/19/18 1942)  Pulse: 81 (12/19/18 1942)  Resp: 19 (12/19/18 1942)  BP: 127/71 (12/19/18 1942)  SpO2: 97 % (12/19/18 1942) Vital Signs (24h Range):  Temp:  [97.7 °F (36.5 °C)-98.7 °F (37.1 °C)] 98.1 °F (36.7 °C)  Pulse:  [74-85] 81  Resp:  [18-22] 19  SpO2:  [93 %-97 %] 97 %  BP: (110-127)/(58-71) 127/71     Weight: 107.3 kg (236 lb 8.9 oz)  Body mass index is 33.94 kg/m².    Intake/Output Summary (Last 24 hours) at 12/19/2018 2014  Last data filed at 12/19/2018 1855  Gross per 24 hour   Intake 830 ml   Output 1475 ml   Net -645 ml      Physical Exam   Constitutional: He is oriented to person, place, and time. He appears well-developed and well-nourished. No distress.   HENT:   Head: Normocephalic and atraumatic.   Mouth/Throat: Oropharynx is clear and moist.   Eyes: EOM are normal. Pupils are equal, round, and reactive to light.   Neck: Normal range of motion. Neck supple.   Cardiovascular: Normal rate and normal heart sounds. Exam reveals no gallop and no friction rub.   No murmur heard.  Pulmonary/Chest: Effort normal and breath sounds normal. No stridor. No respiratory distress. He has no wheezes. He has no rales.   Abdominal: Soft. Bowel sounds are normal. He exhibits no mass. There is no tenderness. There is no guarding.   Bandage in RLQ dry and intact, distended but soft   Musculoskeletal: He exhibits edema.   Neurological: He is alert and oriented to person, place, and time.   Skin: He is not diaphoretic.   Bilateral feet  dressed   Nursing note and vitals reviewed.      Significant Labs:   BMP:   Recent Labs   Lab 12/19/18 0224   *      K 4.5      CO2 29   BUN 38*   CREATININE 0.8   CALCIUM 8.3*     CBC:   Recent Labs   Lab 12/18/18  0508 12/19/18 0224   WBC 7.09 7.51   HGB 8.9* 8.7*   HCT 27.6* 27.2*    320

## 2018-12-20 NOTE — PROGRESS NOTES
"  [facility]  Consult Note    SUMMARY     Recommendations     1. Can change oral supplement to optisource  bid to decrease CHO intake while providing adequate protein (400 kcal, 50g pro per day)   2.RD to monitor    Goals: Meet >85% EEN Daily  Nutrition Goal Status: new  Communication of RD Recs: reviewed with RN    Reason for Assessment    Reason For Assessment: consult(malnutrition)  Diagnosis: cardiac disease  Relevant Medical History: CKD, CAD, DMII, HLD, HTN, PVD  Interdisciplinary Rounds: did not attend    General Information Comments: Diet advanced to Cardiac/ADA, pt tolerating % of meals. Drinking oral supplements when received. Discussed hospital diet, pt c/o salty food - Will address salt concerns with . Family states the pt needs 250g pro/day, but this is an excessive amt necessary. Discussed oral supplements and reducing CHO content with adequate po intake. NFPE: adequate/excess fat mass; moderate loss of lean body mass evident in upper extremities, temporal. + wounds; Fluid retention issues.     Nutrition Discharge Planning: Meet >85% EEN daily    Nutrition Risk Screen    Nutrition Risk Screen: large or nonhealing wound, burn or pressure injury    Nutrition/Diet History    Patient Reported Diet/Restrictions/Preferences: diabetic diet, heart healthy(per daughter's report)  Food Preferences: Denies  Spiritual, Cultural Beliefs, Buddhism Practices, Values that Affect Care: no  Food Allergies: NKFA  Factors Affecting Nutritional Intake: None identified at this time    Anthropometrics    Temp: 98.7 °F (37.1 °C)  Height Method: Stated  Height: 5' 10" (177.8 cm)  Height (inches): 70 in  Weight Method: Bed Scale  Weight: (? wt accuracy, 140 kg on 12/9 and pt does not appear 525#)  Weight (lb): (!) 525.36 lb  Ideal Body Weight (IBW), Male: 166 lb  % Ideal Body Weight, Male (lb): 132.54 lb  BMI (Calculated): 31.6  Weight Loss: other (see comments)(wt gain pta 2/2 " fluid)  Weight Loss Since Admission: (wt loss since admission 2/2 fluid staus (-5.5L))       Lab/Procedures/Meds    Pertinent Labs Reviewed: reviewed  Pertinent Labs Comments: B-190  Pertinent Medications Reviewed: reviewed  Pertinent Medications Comments: furosemide    Physical Findings/Assessment     Overall: Loss of LBM  Skin: Partial Thickness leg wound, Full thickness foot ulcer, Stage II buttocks    Estimated/Assessed Needs    Weight Used For Calorie Calculations: 99.8 kg (220 lb 0.3 oz)  Energy Calorie Requirements (kcal): 1814  Energy Need Method: [place]-St Jeor  Protein Requirements: 100 gms (1gm/kg)  Weight Used For Protein Calculations: 99.8 kg (220 lb 0.3 oz)     Estimated Fluid Requirement Method: RDA Method(or per MD)  RDA Method (mL): 1814  CHO Requirement: 225g      Nutrition Prescription Ordered    Current Diet Order: Bariatric Soft/cardiac/consistent CHO    Evaluation of Received Nutrient/Fluid Intake    Other Calories (kcal): 0  I/O: reviewed  Energy Calories Required: meeting needs  Protein Required: meeting needs  Fluid Required: (per MD)  Comments: LBM:   Tolerance: tolerating  % Intake of Estimated Energy Needs: 75 - 100 %  % Meal Intake: 75 - 100 % + oral supplements    Nutrition Risk    Level of Risk/Frequency of Follow-up: (1 x week)     Assessment and Plan    Nutrition Problem  Increased nutrient needs    Related to (etiology):   Wound healing    Signs and Symptoms (as evidenced by):   Partial Thickness, Full thickness wounds    Interventions  Commercial Beverage    Nutrition Diagnosis Status:   New        Monitor and Evaluation    Food and Nutrient Intake: food and beverage intake, energy intake  Food and Nutrient Adminstration: diet order  Physical Activity and Function: nutrition-related ADLs and IADLs  Anthropometric Measurements: weight change, weight  Biochemical Data, Medical Tests and Procedures: electrolyte and renal panel, glucose/endocrine profile, inflammatory  profile  Nutrition-Focused Physical Findings: overall appearance     Malnutrition Assessment  Malnutrition Type: acute illness or injury  Skin (Micronutrient): wounds unhealed       Confucianism Region (Muscle Loss): moderate depletion  Upper Arm Area: moderate depletion  Clavicle and Acromion Bone Region (Muscle Loss): moderate depletion  Dorsal Hand (Muscle Loss): mild depletion  Anterior Thigh Region (Muscle Loss): mild depletion   Edema (Fluid Accumulation): 3-->moderate     Muscle Loss Evaluation (Final Summary): Moderate protein-calorie malnutrition         Nutrition Follow-Up    RD Follow-up?: Yes

## 2018-12-20 NOTE — PLAN OF CARE
Problem: Skin Injury Risk Increased  Goal: Skin Health and Integrity  Outcome: Ongoing (interventions implemented as appropriate)  Intervention: Optimize Skin Protection   12/16/18 2200 12/17/18 0800 12/19/18 0839   Prevent Additional Skin Injury   Pressure Reduction Devices --  heel offloading device utilized --    Pressure Reduction Techniques --  --  frequent weight shift encouraged   Monitor and Manage Hypervolemia   Skin Protection electrode sites changed;incontinence pads utilized;skin-to-device areas padded;tubing/devices free from skin contact --  --      Intervention: Promote and Optimize Oral Intake   12/19/18 1937   Monitor and Manage Anemia   Oral Nutrition Promotion calorie dense liquids provided;rest periods promoted         Problem: Wound  Goal: Optimal Wound Healing  Outcome: Ongoing (interventions implemented as appropriate)  Intervention: Promote Effective Wound Healing   12/19/18 0347 12/19/18 0400 12/19/18 1937   Monitor and Manage Anemia   Oral Nutrition Promotion --  --  calorie dense liquids provided;rest periods promoted   Prevent or Manage Pain   Pain Management Interventions --  pain management plan reviewed with patient/caregiver --    Prevent or Manage Pain   Sleep/Rest Enhancement awakenings minimized;family presence promoted --  --          Problem: Infection  Goal: Infection Symptom Resolution  Outcome: Ongoing (interventions implemented as appropriate)  Intervention: Prevent or Manage Infection   12/19/18 1937   Prevent or Manage Infection   Fever Reduction/Comfort Measures lightweight bedding   Infection Management aseptic technique maintained   Manage Diarrhea   Isolation Precautions contact precautions maintained

## 2018-12-20 NOTE — PLAN OF CARE
12/20/18 1225   Final Note   Assessment Type Final Discharge Note   Anticipated Discharge Disposition LTAC   Hospital Follow Up  Appt(s) scheduled? Yes   Discharge plans and expectations educations in teach back method with documentation complete? Yes   Right Care Referral Info   Post Acute Recommendation Other   Referral Type LTAC   Facility Name [facility]

## 2018-12-20 NOTE — NURSING
Report called to Heaven WALSH at [facility] [phone]. Pt to go to room 226. Waiting on transport.

## 2018-12-20 NOTE — PROGRESS NOTES
Nursing performed dressing change to left foot this pm with Richard. Patient and sister pleased with application of dressing and appearance of foot.   Recommend: Continue wound care with Veeyl when discharged to LTAC.

## 2018-12-20 NOTE — PLAN OF CARE
12/20/18 1501   Post-Acute Status   Post-Acute Authorization Placement   Post-Acute Placement Status ([facility])

## 2018-12-20 NOTE — PROGRESS NOTES
Message sent to [facility] via [employer] [facility] to inquire if all necessary paperwork needed for pt to discharge to facility on today has been completed.  TN to follow in [facility] for response.    1052-call placed to Rhys with [facility] to follow up on insurance authorization.  Rhys reports that she did not hear anything yet. TN provided contact information to Teresita with [facility] for her to follow up as Teresita informed TN on yesterday that only [facility] facilities are in network with pts particular BCBS plan.      1128- pt accepted to [facility] via [employer] [facility] and booking completed with [facility] as discharge destination.    1155- call report information received from Rhys pt will go to room 226 and call report to charge nurse at [phone] at [facility].  TN to call to schedule w/c van transportation.    1215- call placed to hospitals, spoke to Nanda.  Wheelchair van scheduled for  with in the hour.    1220- transportation packet and call report information placed in pts slot at nurses station and call placed to pts nurse Davis to advise of this information and that  will be within the hour.

## 2018-12-20 NOTE — ASSESSMENT & PLAN NOTE
On 12/11  PEA? ICD interrogated with no shocks or abnormal rhythms.  Etiology unclear. No PE, no NSTEMI, no electrolyte abnormalities, no hypoxia, no toxins  May be PEA arrest due to severe infection in setting of biventricular cardiomyopathy and severe CAD  Now stable on Telemetry.  PT/OT consulted and recommending SNF vs LTAC.  SW consulted.

## 2018-12-20 NOTE — PLAN OF CARE
Problem: Wound  Goal: Optimal Wound Healing    Intervention: Promote Effective Wound Healing   12/19/18 0347 12/19/18 2248   Prevent or Manage Pain   Pain Management Interventions --  care clustered;position adjusted;quiet environment facilitated;relaxation techniques promoted   Prevent or Manage Pain   Sleep/Rest Enhancement awakenings minimized;family presence promoted --    Wounds remains clean and intact.       Problem: Infection  Goal: Infection Symptom Resolution    Intervention: Prevent or Manage Infection   12/19/18 1937 12/19/18 2300   Prevent or Manage Infection   Fever Reduction/Comfort Measures lightweight bedding --    Infection Management aseptic technique maintained --    Manage Diarrhea   Isolation Precautions --  contact precautions maintained   Remains afebrile, administered antibiotics as ordered.       Comments: PRN medication was effective. Had restful sleep. Repositioned self with minimal assist. Call light at side.

## 2018-12-20 NOTE — ASSESSMENT & PLAN NOTE
He has been counseled on advanced directives. No decisions made yet. He is still legally  but not in contact with wife- needs HCPOA.

## 2018-12-20 NOTE — NURSING
JELLY Loyd) at bedside for transport to [facility]. Patient awake, alert, oriented, prn percocet given 1243 for anticipated transport. Pt used sliding board standby assist to get to . [place] 3L continued for travel. Pt belongings sent with sister and son. DC packet sent. No apparent distress noted.

## 2018-12-21 PROBLEM — R23.8 BLISTERS OF MULTIPLE SITES: Status: ACTIVE | Noted: 2018-12-21

## 2018-12-21 NOTE — ASSESSMENT & PLAN NOTE
On 12/11  PEA? ICD interrogated with no shocks or abnormal rhythms.  Etiology unclear. No PE, no NSTEMI, no electrolyte abnormalities, no hypoxia, no toxins  May be PEA arrest due to severe infection in setting of biventricular cardiomyopathy and severe CAD  Remained stable on Telemetry.  PT/OT consulted and recommending SNF vs LTAC.  SW consulted.

## 2018-12-21 NOTE — ASSESSMENT & PLAN NOTE
Patient has severe 3v disease per [facility] in 2016 with no targets for PCI or CABG  Continue asa. Added plavix (discussed with Juan Antonio). Continue lipid management. Hold BB and ACEi for hypotension

## 2018-12-21 NOTE — ASSESSMENT & PLAN NOTE
He has been counseled on advanced directives. No decisions made yet. He is still legally  but not in contact with wife- needs HCPOA. Info given prior to transfer to LTAC.

## 2018-12-21 NOTE — ASSESSMENT & PLAN NOTE
Abdominal US with ascites  Previous paracentesis in 10/2018 with SAAG 0.9, not consistent with portal hypertension  Repeat paracentesis 12/12 with light green clear fluid. SAAG 0.6. Total protein 4.2.  with 196 PMNs, not consistent with SBP.   Ascites cytology with reactive mesothelial cells and many neutrophils on a fibrinous background but negative for malignancy. AFB culture and smear pending  UA negative for protein, UPC not consistent with nephrotic syndrome  Not clinically or radiologically consistent with pancreatitis  GI consulted in 10/2018 with prior paracentesis and suggested it could be due to CHF. Total protein is consistent with this but SAAG is not. Albumin is also dismally low and he does have generalized edema.

## 2018-12-21 NOTE — DISCHARGE SUMMARY
[facility] Medicine  Discharge Summary      Patient Name: Marvin Ray  MRN: [id number]  Admission Date: 12/10/2018  Hospital Length of Stay: 9 days  Discharge Date and Time: 12/20/2018  3:10 PM  Attending Physician: No att. providers found   Discharging Provider: Kirsten Trinh MD  Primary Care Provider: Rubén Davis MD      HPI:   58 y.o. male with DM2, HLD, HTN, CAD, chronic combined heart failure, CKD stage III presents with a complaint of left foot wound infection.  Prior cultures show only sensitive to meropenem.  Outpatient IV antibiotics were attempted to be setup last night and throughout the day today through his PCP but were unable to be procured.  He denies fever, chills, cough, SOB, chest pain, palpitations, headaches, vision changes, N/V/D, abdominal pain, or dysuria.  Placed in observation for PICC line placement and to setup home IV abx.    * No surgery found *      Hospital Course:   Patient admitted to observation 12/10/2018 for PICC line placement for IV antibiotics for left wound infection (12/5/18 wound culture Acinetobacter baumannii/haemolyticus sensitive to meropenem. IV meropenum started in ED.   On 12/11/18 as he was dressing for discharge, he suffered a cardiac arrest with ROSC after ACLS. Per his sister, he was standing getting ready to leave the hospital and then fell back into the bed. He was intubated during the code and transferred to ICU. He was hypotensive after the arrest requiring Levophed. Cardiology and Pulmonary consulted. ICD interrogated- no events, no shocks. Suspect PEA arrest due to severe infection with underlying biventricular failure and severe CAD. TTE with EF 30%, diastolic dysfunction, new RV changes. CT negative for PE. Troponins elevated at 0.5 but downtrended. Initially started ASA, Plavix, and full dose Lovenox with concern for NSTEMI, but unlikely NSTEMI given minimal troponin elevations that could be explained by chest  compressions alone. Lovenox discontinued. Continue DAPT given severe CAD with no intervention (not a candidate for PCI or CABG given anatomy). Blood cultures checked and NGTD. Wound care following for DM foot wounds. Abdominal US with ascites s/p paracentesis with 2.4L of clear light green ascites removed on 12/12. SAAG 0.6 which is NOT consistent with portal hypertension. Total protein 4.2 which would be consistent with cardiac etiology. No SBP. No pancreatitis on CT abd/pelvis and none clinically either. UA and UPC did not show protein; ascites not from nephrotic syndrome. Cytology showed reactive mesothelial cells and many neutrophils on a fibrinous background but no evidence of malignancy. Ascites AFB culture pending. He was extubated to BiPAP on 12/13 and weaned to nasal cannula.  Levophed off since 12/14. He continued to improve with no other acute events during hospitalization. He was stable for step down to floor on 12/15.  PT/OT recommending SNF vs LTAC. SW consulted for placement for continued care. He was accepted to LTAC and transferred on 12/20/2018.      Consults:   Consults (From admission, onward)        Status Ordering Provider     Inpatient consult to Cardiology  Once     Provider:  Ismael Romano MD    Completed ALYSON ORTIZ     Inpatient consult to Infectious Diseases  Once     Provider:  Jorgito Velez MD    Completed MOODY SIN     Inpatient consult to Pulmonology  Once     Provider:  Marlon Yee MD    Completed ALYSON ORTIZ     Inpatient consult to Registered Dietitian/Nutritionist  Once     Provider:  (Not yet assigned)    Completed LUIZ MANUEL     Inpatient consult to Registered Dietitian/Nutritionist  Once     Provider:  (Not yet assigned)    Completed ALAINA DE LA GARZA     IP consult to dietary  Once     Provider:  (Not yet assigned)    Completed ALYSON ORTIZ          * Cardiac arrest    On 12/11  PEA? ICD interrogated with no shocks or  abnormal rhythms.  Etiology unclear. No PE, no NSTEMI, no electrolyte abnormalities, no hypoxia, no toxins  May be PEA arrest due to severe infection in setting of biventricular cardiomyopathy and severe CAD  Remained stable on Telemetry.  PT/OT consulted and recommending SNF vs LTAC.  SW consulted.     Goals of care, counseling/discussion    He has been counseled on advanced directives. No decisions made yet. He is still legally  but not in contact with wife- needs HCPOA. Info given prior to transfer to LTAC.     Severe malnutrition    Appreciate nutrition recs. Albumin 1.7.     Other ascites    Abdominal US with ascites  Previous paracentesis in 10/2018 with SAAG 0.9, not consistent with portal hypertension  Repeat paracentesis 12/12 with light green clear fluid. SAAG 0.6. Total protein 4.2.  with 196 PMNs, not consistent with SBP.   Ascites cytology with reactive mesothelial cells and many neutrophils on a fibrinous background but negative for malignancy. AFB culture and smear pending  UA negative for protein, UPC not consistent with nephrotic syndrome  Not clinically or radiologically consistent with pancreatitis  GI consulted in 10/2018 with prior paracentesis and suggested it could be due to CHF. Total protein is consistent with this but SAAG is not. Albumin is also dismally low and he does have generalized edema.       Diabetic foot infection    Tissue culture obtained by Vascular Surgery obtained 12/5/18 grew Acinetobacter baumannii/haemolyticus sensitive to meropenem.    ID consulted- recommend meropenem and vanc x6 weeks   PICC in place R arm for outpatient antibiotics  Wound care following  ESR 72, CRP 89 on 12/11- trend weekly  Poor wound healing likely given PAD and DM with extensive wound. Discussed with patient on 12/14  Will need ID, Vascular, Wound care follow up after discharge     Open wound of left foot    As above     Acute on chronic combined systolic and diastolic heart failure     No evidence of decompensation on admit  Post code BNP elevated, though this is expected  Patient has ascites and bilateral pleural effusions. Does not have significant pulmonary edema  Will change lasix to 40mg IV daily- patient has good UOP and improving Cr with this regimen  Likely will need to add spironolactone for ascites and CHF when tolerated by BP  Monitor UOP    TTE 12/11  · Severely decreased left ventricular systolic function. The estimated ejection fraction is 20%  · Severe global hypokinetic wall motion. Cannot exclude RWMA.  · Septal wall has abnormal motion. Systolic and diastolic flattening of the interventricular septum consistent with right ventricle pressure and volume overload.  · Left ventricular diastolic dysfunction.  · Mild right ventricular enlargement.  · Mildly to moderately reduced right ventricular systolic function.  · Moderate tricuspid regurgitation.  · There is a large left pleural effusion.     PVD (peripheral vascular disease)    Continue asa, plavix, lipid reduction meds     CAD (coronary artery disease)    Patient has severe 3v disease per [facility] in 2016 with no targets for PCI or CABG  Continue asa. Added plavix (discussed with Juan Antonio). Continue lipid management. Hold BB and ACEi for hypotension       Essential hypertension    Hold all BP meds for now  Currently normotensive off BP meds.     Tophaceous gout    Continue home allopurinol       Neovascular glaucoma of both eyes    Continue eye drops     HLD (hyperlipidemia)    Last lipids 10/2018 are well controlled  Continue home regimen of ezetimibe and fish oil (statin intolerance)     DM type 2 with diabetic peripheral neuropathy    Last HgbA1c   Lab Results   Component Value Date    HGBA1C 8.3 (H) 10/30/2018   Hold oral antihyperglycemics while inpatient    Meds: detemir 5 + SSI  ACHS accuchecks, ADA diet, hypoglycemic protocol          Final Active Diagnoses:    Diagnosis Date Noted POA    PRINCIPAL PROBLEM:  Cardiac arrest  [I46.9] 12/11/2018 No    Goals of care, counseling/discussion [Z71.89] 12/14/2018 Not Applicable    Other ascites [R18.8] 12/12/2018 Yes    Severe malnutrition [E43] 12/12/2018 Yes    Diabetic foot infection [E11.628, L08.9] 12/10/2018 Yes    Open wound of left foot [S91.302A] 11/08/2018 Yes    Acute on chronic combined systolic and diastolic heart failure [I50.43] 10/16/2018 Yes    PVD (peripheral vascular disease) [I73.9] 10/10/2018 Yes     Chronic    CAD (coronary artery disease) [I25.10] 05/31/2016 Yes     Chronic    Essential hypertension [I10] 05/06/2016 Yes     Chronic    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] 10/22/2015 Yes     Chronic    Neovascular glaucoma of both eyes [H40.53X0] 03/20/2015 Yes     Chronic    DM type 2 with diabetic peripheral neuropathy [E11.42] 10/22/2014 Yes     Chronic    HLD (hyperlipidemia) [E78.5] 10/22/2014 Yes     Chronic      Problems Resolved During this Admission:    Diagnosis Date Noted Date Resolved POA    Acute renal insufficiency [N28.9] 12/13/2018 12/19/2018 No    Elevated troponin [R74.8] 12/12/2018 12/19/2018 Yes    Acute hypoxemic respiratory failure [J96.01] 12/11/2018 12/18/2018 No    Shock [R57.9] 12/11/2018 12/18/2018 No    LAYNE (acute kidney injury) [N17.9] 10/12/2018 12/18/2018 Yes       Discharged Condition: stable    Disposition: Long Term Care    Follow Up:  Follow-up Information     Rubén Davis MD In 3 days.    Specialty:  Family Medicine  Contact information:  [address] [facility] [address]  [phone]             Jorgito Velez MD In 2 weeks.    Specialties:  Infectious Diseases, Hospice and Palliative Medicine  Contact information:  [address]  [facility] [address]  [phone]             Mitch Chan MD In 2 weeks.    Specialties:  Cardiology, Vascular Surgery, Surgery  Contact information:  [address]  [address]  [facility] [address]  [phone]             [facility].    Contact information:  [address]  [facility] [address]               Patient Instructions:      Diet Cardiac     Diet diabetic     Notify your health care provider if you experience any of the following:  increased confusion or weakness     Notify your health care provider if you experience any of the following:  persistent dizziness, light-headedness, or visual disturbances     Notify your health care provider if you experience any of the following:  worsening rash     Notify your health care provider if you experience any of the following:  severe persistent headache     Notify your health care provider if you experience any of the following:  difficulty breathing or increased cough     Notify your health care provider if you experience any of the following:  redness, tenderness, or signs of infection (pain, swelling, redness, odor or green/yellow discharge around incision site)     Notify your health care provider if you experience any of the following:  severe uncontrolled pain     Notify your health care provider if you experience any of the following:  persistent nausea and vomiting or diarrhea     Notify your health care provider if you experience any of the following:  temperature >100.4     Activity as tolerated         Medications:  Reconciled Home Medications:      Medication List      START taking these medications    clopidogrel 75 mg tablet  Commonly known as:  PLAVIX  Take 1 tablet (75 mg total) by mouth once daily.     dextrose 5 % SolP 250 mL with vancomycin 1,000 mg SolR 1,250 mg  Inject 1,250 mg into the vein once daily.     insulin aspart U-100 100 unit/mL Inpn pen  Commonly known as:  NovoLOG  Inject 3 Units into the skin 3 (three) times daily.     insulin detemir U-100 100 unit/mL (3 mL) Inpn pen  Commonly known as:  LEVEMIR FLEXTOUCH  Inject 10 Units into the skin every evening.     miconazole NITRATE 2 % 2 % top powder  Commonly known as:  MICOTIN  Apply topically 2 (two) times daily.    "  prednisoLONE acetate 1 % Drps  Commonly known as:  PRED FORTE  Place 1 drop into the right eye once daily. for 10 days     senna-docusate 8.6-50 mg 8.6-50 mg per tablet  Commonly known as:  PERICOLACE  Take 1 tablet by mouth 2 (two) times daily.     sodium chloride 0.9% SolP 100 mL with meropenem 1 gram SolR 2 g  Inject 2 g into the vein every 8 (eight) hours.        CHANGE how you take these medications    allopurinol 300 MG tablet  Commonly known as:  ZYLOPRIM  Take 1 tablet (300 mg total) by mouth once daily.  What changed:    · medication strength  · how much to take     brimonidine 0.1% 0.1 % Drop  Commonly known as:  ALPHAGAN P  Place 1 drop into both eyes 2 (two) times daily.  What changed:  when to take this     carvedilol 3.125 MG tablet  Commonly known as:  COREG  Take 1 tablet (3.125 mg total) by mouth 2 (two) times daily.  What changed:    · medication strength  · how much to take     fish oil-omega-3 fatty acids 300-1,000 mg capsule  Take 2 capsules (2 g total) by mouth 2 (two) times daily.  What changed:  how much to take        CONTINUE taking these medications    aspirin 81 MG Chew  Take 81 mg by mouth once daily.     coenzyme Q10 100 mg capsule  Take 100 mg by mouth every morning.     collagenase ointment  Commonly known as:  SANTYL  Apply topically once daily.     dorzolamide-timolol 2-0.5% 22.3-6.8 mg/mL ophthalmic solution  Commonly known as:  COSOPT  Place 1 drop into both eyes 3 (three) times daily.     ezetimibe 10 mg tablet  Commonly known as:  ZETIA  Take 1 tablet (10 mg total) by mouth once daily.     furosemide 40 MG tablet  Commonly known as:  LASIX  Take 1 tablet (40 mg total) by mouth 2 (two) times daily.     multivitamin Tab  Take 1 tablet by mouth every morning.     oxyCODONE-acetaminophen 5-325 mg per tablet  Commonly known as:  PERCOCET  Take 1 tablet by mouth every 6 (six) hours as needed for Pain.     pen needle, diabetic 31 gauge x 1/4" Ndle  Use as directed        STOP taking " these medications    amLODIPine 5 MG tablet  Commonly known as:  NORVASC     benazepril 40 MG tablet  Commonly known as:  LOTENSIN     insulin glargine-lixisenatide 100 unit-33 mcg/mL Inpn  Commonly known as:  SOLIQUA 100/33     ketorolac 0.5% 0.5 % Drop  Commonly known as:  ACULAR            Indwelling Lines/Drains at time of discharge:   Lines/Drains/Airways     Peripherally Inserted Central Catheter Line                 PICC Double Lumen 12/10/18 2241 right basilic 10 days          Drain                 Open Drain 11/14/18 1353 Left Foot Other (see comments) Other (see comments) 36 days          Airway                 Airway - Non-Surgical 11/16/18 0815 Other (Comment) 35 days          Pressure Ulcer                 Pressure Injury 12/14/18 0730 Buttocks Stage 2 7 days                Time spent on the discharge of patient: 45 minutes  Patient was seen and examined on the date of discharge and determined to be suitable for discharge.         Kirsten Trinh MD  Department of Hospital Medicine  [facility]

## 2018-12-22 ENCOUNTER — PATIENT MESSAGE (OUTPATIENT)
Dept: VASCULAR SURGERY | Facility: CLINIC | Age: 58
End: 2018-12-22

## 2018-12-22 NOTE — PT/OT/SLP DISCHARGE
Physical Therapy Discharge Summary    Name: Marvin Ray  MRN: [id number]   Principal Problem: Cardiac arrest     Patient Discharged from acute Physical Therapy on 2018.  Please refer to prior PT noted date on 2018 for functional status.     Assessment:     Patient appropriate for care in another setting.    Objective:     GOALS:   Multidisciplinary Problems     Physical Therapy Goals        Problem: Physical Therapy Goal    Goal Priority Disciplines Outcome Goal Variances Interventions   Physical Therapy Goal     PT, PT/OT Ongoing (interventions implemented as appropriate)     Description:  Goals to be met by: 2018     Patient will increase functional independence with mobility by performin. Supine to sit with Modified [place]  2. Sit to stand transfer with Minimal Assistance  3. Gait to e assessed.   4. Lower extremity exercise program x10 reps per handout, with supervision  5.  Bed to chair t/f with Min A  6.  W/C propulsion x 150' with Supervision                      Reasons for Discontinuation of Therapy Services  Transfer to alternate level of care.      Plan:     Patient Discharged to: Long Term Acute Care.    Consuelo Foote, PT  2018

## 2018-12-24 ENCOUNTER — TELEPHONE (OUTPATIENT)
Dept: VASCULAR SURGERY | Facility: CLINIC | Age: 58
End: 2018-12-24

## 2018-12-24 ENCOUNTER — PATIENT MESSAGE (OUTPATIENT)
Dept: VASCULAR SURGERY | Facility: CLINIC | Age: 58
End: 2018-12-24

## 2018-12-24 DIAGNOSIS — S90.852D: ICD-10-CM

## 2018-12-24 DIAGNOSIS — S91.302D OPEN WOUND OF LEFT FOOT WITH COMPLICATION, SUBSEQUENT ENCOUNTER: Primary | ICD-10-CM

## 2018-12-24 NOTE — TELEPHONE ENCOUNTER
Spoke to Ms. Chloé to explain that Dr. Chan messaged office this afternoon and stated he is scheduling her brother for surgery on Friday the 28th. Explained would contact the facility to let them know about his preop appointment. She stated understanding.

## 2018-12-25 PROBLEM — R53.81 DEBILITY: Status: ACTIVE | Noted: 2018-12-25

## 2018-12-26 ENCOUNTER — TELEPHONE (OUTPATIENT)
Dept: VASCULAR SURGERY | Facility: CLINIC | Age: 58
End: 2018-12-26

## 2018-12-26 NOTE — TELEPHONE ENCOUNTER
Called facility to confirm that they were aware that the patient has a preop appointment for [facility]. Gave all information to the .

## 2018-12-27 ENCOUNTER — TELEPHONE (OUTPATIENT)
Dept: VASCULAR SURGERY | Facility: CLINIC | Age: 58
End: 2018-12-27

## 2018-12-27 RX ORDER — DORZOLAMIDE HCL 20 MG/ML
1 SOLUTION/ DROPS OPHTHALMIC 3 TIMES DAILY
Status: ON HOLD | COMMUNITY
End: 2019-01-23 | Stop reason: HOSPADM

## 2018-12-27 RX ORDER — METOLAZONE 2.5 MG/1
2.5 TABLET ORAL DAILY
Status: ON HOLD | COMMUNITY
End: 2019-01-23 | Stop reason: SDUPTHER

## 2018-12-27 RX ORDER — TIMOLOL MALEATE 5 MG/ML
1 SOLUTION OPHTHALMIC 3 TIMES DAILY
Status: ON HOLD | COMMUNITY
End: 2019-01-23 | Stop reason: HOSPADM

## 2018-12-27 NOTE — TELEPHONE ENCOUNTER
----- Message from Madeline Herring sent at 12/27/2018 12:26 PM CST -----  Contact: Wife-   Pts wife is asking for a call back in ref to being set up with [facility]. [phone] Call back with no answer. Did not leave a voicemail.

## 2018-12-28 ENCOUNTER — ANESTHESIA (OUTPATIENT)
Dept: SURGERY | Facility: HOSPITAL | Age: 58
End: 2018-12-28
Payer: COMMERCIAL

## 2018-12-28 ENCOUNTER — ANESTHESIA EVENT (OUTPATIENT)
Dept: SURGERY | Facility: HOSPITAL | Age: 58
End: 2018-12-28

## 2018-12-28 ENCOUNTER — HOSPITAL ENCOUNTER (OUTPATIENT)
Facility: HOSPITAL | Age: 58
Discharge: NURSING, REHAB OR LONG-TERM CARE | End: 2018-12-28
Attending: SURGERY | Admitting: SURGERY
Payer: COMMERCIAL

## 2018-12-28 VITALS
DIASTOLIC BLOOD PRESSURE: 65 MMHG | TEMPERATURE: 98 F | BODY MASS INDEX: 34.36 KG/M2 | SYSTOLIC BLOOD PRESSURE: 112 MMHG | HEART RATE: 81 BPM | HEIGHT: 70 IN | WEIGHT: 240 LBS | RESPIRATION RATE: 18 BRPM | OXYGEN SATURATION: 97 %

## 2018-12-28 DIAGNOSIS — S91.302D OPEN WOUND OF LEFT FOOT, SUBSEQUENT ENCOUNTER: Primary | ICD-10-CM

## 2018-12-28 PROBLEM — S91.302A OPEN WOUND OF LEFT FOOT: Status: RESOLVED | Noted: 2018-11-08 | Resolved: 2018-12-28

## 2018-12-28 LAB — POCT GLUCOSE: 90 MG/DL (ref 70–110)

## 2018-12-28 PROCEDURE — 28810 AMPUTATION TOE & METATARSAL: CPT | Mod: T1,,, | Performed by: SURGERY

## 2018-12-28 PROCEDURE — 87102 FUNGUS ISOLATION CULTURE: CPT | Mod: 59

## 2018-12-28 PROCEDURE — 88305 TISSUE SPECIMEN TO PATHOLOGY - SURGERY: ICD-10-PCS | Mod: 26,,, | Performed by: PATHOLOGY

## 2018-12-28 PROCEDURE — 11047 DBRDMT BONE EACH ADDL: CPT | Mod: ,,, | Performed by: SURGERY

## 2018-12-28 PROCEDURE — 25000003 PHARM REV CODE 250: Performed by: NURSE ANESTHETIST, CERTIFIED REGISTERED

## 2018-12-28 PROCEDURE — 88311 TISSUE SPECIMEN TO PATHOLOGY - SURGERY: ICD-10-PCS | Mod: 26,,, | Performed by: PATHOLOGY

## 2018-12-28 PROCEDURE — 64445 NJX AA&/STRD SCIATIC NRV IMG: CPT | Performed by: ANESTHESIOLOGY

## 2018-12-28 PROCEDURE — 11047: ICD-10-PCS | Mod: ,,, | Performed by: SURGERY

## 2018-12-28 PROCEDURE — 88305 TISSUE EXAM BY PATHOLOGIST: CPT | Mod: 26,,, | Performed by: PATHOLOGY

## 2018-12-28 PROCEDURE — 28810 PR AMPUTATION METATARSAL+TOE,SINGLE: ICD-10-PCS | Mod: 51,T2,, | Performed by: SURGERY

## 2018-12-28 PROCEDURE — 37000008 HC ANESTHESIA 1ST 15 MINUTES: Performed by: SURGERY

## 2018-12-28 PROCEDURE — S0077 INJECTION, CLINDAMYCIN PHOSP: HCPCS | Performed by: SURGERY

## 2018-12-28 PROCEDURE — 63600175 PHARM REV CODE 636 W HCPCS: Performed by: NURSE ANESTHETIST, CERTIFIED REGISTERED

## 2018-12-28 PROCEDURE — 37000009 HC ANESTHESIA EA ADD 15 MINS: Performed by: SURGERY

## 2018-12-28 PROCEDURE — 36000705 HC OR TIME LEV I EA ADD 15 MIN: Performed by: SURGERY

## 2018-12-28 PROCEDURE — 11044 PR DEBRIDEMENT, SKIN, SUB-Q TISSUE,MUSCLE,BONE,=<20 SQ CM: ICD-10-PCS | Mod: 51,,, | Performed by: SURGERY

## 2018-12-28 PROCEDURE — 76942 ECHO GUIDE FOR BIOPSY: CPT | Performed by: ANESTHESIOLOGY

## 2018-12-28 PROCEDURE — 87206 SMEAR FLUORESCENT/ACID STAI: CPT

## 2018-12-28 PROCEDURE — 25000242 PHARM REV CODE 250 ALT 637 W/ HCPCS: Performed by: ANESTHESIOLOGY

## 2018-12-28 PROCEDURE — 87116 MYCOBACTERIA CULTURE: CPT | Mod: 59

## 2018-12-28 PROCEDURE — 87077 CULTURE AEROBIC IDENTIFY: CPT

## 2018-12-28 PROCEDURE — 36000707: Performed by: SURGERY

## 2018-12-28 PROCEDURE — 36000706: Performed by: SURGERY

## 2018-12-28 PROCEDURE — 87015 SPECIMEN INFECT AGNT CONCNTJ: CPT | Mod: 59

## 2018-12-28 PROCEDURE — D9220A PRA ANESTHESIA: Mod: ANES,,, | Performed by: ANESTHESIOLOGY

## 2018-12-28 PROCEDURE — 87186 SC STD MICRODIL/AGAR DIL: CPT | Mod: 59

## 2018-12-28 PROCEDURE — 25000003 PHARM REV CODE 250: Performed by: ANESTHESIOLOGY

## 2018-12-28 PROCEDURE — 87070 CULTURE OTHR SPECIMN AEROBIC: CPT

## 2018-12-28 PROCEDURE — 25000003 PHARM REV CODE 250: Performed by: SURGERY

## 2018-12-28 PROCEDURE — 11044 DBRDMT BONE 1ST 20 SQ CM/<: CPT | Mod: 51,,, | Performed by: SURGERY

## 2018-12-28 PROCEDURE — 71000016 HC POSTOP RECOV ADDL HR: Performed by: SURGERY

## 2018-12-28 PROCEDURE — 71000033 HC RECOVERY, INTIAL HOUR: Performed by: SURGERY

## 2018-12-28 PROCEDURE — 87075 CULTR BACTERIA EXCEPT BLOOD: CPT

## 2018-12-28 PROCEDURE — 87205 SMEAR GRAM STAIN: CPT

## 2018-12-28 PROCEDURE — 88305 TISSUE EXAM BY PATHOLOGIST: CPT | Performed by: PATHOLOGY

## 2018-12-28 PROCEDURE — 88311 DECALCIFY TISSUE: CPT | Mod: 26,,, | Performed by: PATHOLOGY

## 2018-12-28 PROCEDURE — D9220A PRA ANESTHESIA: Mod: CRNA,,, | Performed by: NURSE ANESTHETIST, CERTIFIED REGISTERED

## 2018-12-28 PROCEDURE — 71000015 HC POSTOP RECOV 1ST HR: Performed by: SURGERY

## 2018-12-28 PROCEDURE — 36000704 HC OR TIME LEV I 1ST 15 MIN: Performed by: SURGERY

## 2018-12-28 PROCEDURE — 71000039 HC RECOVERY, EACH ADD'L HOUR: Performed by: SURGERY

## 2018-12-28 RX ORDER — PROPOFOL 10 MG/ML
VIAL (ML) INTRAVENOUS
Status: DISCONTINUED | OUTPATIENT
Start: 2018-12-28 | End: 2018-12-28

## 2018-12-28 RX ORDER — FENTANYL CITRATE 50 UG/ML
INJECTION, SOLUTION INTRAMUSCULAR; INTRAVENOUS
Status: DISCONTINUED | OUTPATIENT
Start: 2018-12-28 | End: 2018-12-28

## 2018-12-28 RX ORDER — IPRATROPIUM BROMIDE AND ALBUTEROL SULFATE 2.5; .5 MG/3ML; MG/3ML
3 SOLUTION RESPIRATORY (INHALATION)
Status: COMPLETED | OUTPATIENT
Start: 2018-12-28 | End: 2018-12-28

## 2018-12-28 RX ORDER — LIDOCAINE HYDROCHLORIDE 10 MG/ML
1 INJECTION, SOLUTION EPIDURAL; INFILTRATION; INTRACAUDAL; PERINEURAL ONCE
Status: ACTIVE | OUTPATIENT
Start: 2018-12-28

## 2018-12-28 RX ORDER — ACETAMINOPHEN 10 MG/ML
INJECTION, SOLUTION INTRAVENOUS
Status: DISCONTINUED | OUTPATIENT
Start: 2018-12-28 | End: 2018-12-28

## 2018-12-28 RX ORDER — EPHEDRINE SULFATE 50 MG/ML
INJECTION, SOLUTION INTRAVENOUS
Status: DISCONTINUED | OUTPATIENT
Start: 2018-12-28 | End: 2018-12-28

## 2018-12-28 RX ORDER — PHENYLEPHRINE HYDROCHLORIDE 10 MG/ML
INJECTION INTRAVENOUS
Status: DISCONTINUED | OUTPATIENT
Start: 2018-12-28 | End: 2018-12-28

## 2018-12-28 RX ORDER — SODIUM CHLORIDE, SODIUM LACTATE, POTASSIUM CHLORIDE, CALCIUM CHLORIDE 600; 310; 30; 20 MG/100ML; MG/100ML; MG/100ML; MG/100ML
INJECTION, SOLUTION INTRAVENOUS CONTINUOUS
Status: ACTIVE | OUTPATIENT
Start: 2018-12-28

## 2018-12-28 RX ORDER — PROPOFOL 10 MG/ML
VIAL (ML) INTRAVENOUS CONTINUOUS PRN
Status: DISCONTINUED | OUTPATIENT
Start: 2018-12-28 | End: 2018-12-28

## 2018-12-28 RX ORDER — CLINDAMYCIN PHOSPHATE 900 MG/50ML
900 INJECTION, SOLUTION INTRAVENOUS
Status: COMPLETED | OUTPATIENT
Start: 2018-12-28 | End: 2018-12-28

## 2018-12-28 RX ADMIN — IPRATROPIUM BROMIDE AND ALBUTEROL SULFATE 3 ML: .5; 3 SOLUTION RESPIRATORY (INHALATION) at 01:12

## 2018-12-28 RX ADMIN — PROPOFOL 30 MG: 10 INJECTION, EMULSION INTRAVENOUS at 02:12

## 2018-12-28 RX ADMIN — FENTANYL CITRATE 50 MCG: 50 INJECTION INTRAMUSCULAR; INTRAVENOUS at 02:12

## 2018-12-28 RX ADMIN — PHENYLEPHRINE HYDROCHLORIDE 100 MCG: 10 INJECTION INTRAVENOUS at 02:12

## 2018-12-28 RX ADMIN — ACETAMINOPHEN 1000 MG: 10 INJECTION, SOLUTION INTRAVENOUS at 02:12

## 2018-12-28 RX ADMIN — EPHEDRINE SULFATE 10 MG: 50 INJECTION, SOLUTION INTRAMUSCULAR; INTRAVENOUS; SUBCUTANEOUS at 02:12

## 2018-12-28 RX ADMIN — SODIUM CHLORIDE, SODIUM LACTATE, POTASSIUM CHLORIDE, AND CALCIUM CHLORIDE: .6; .31; .03; .02 INJECTION, SOLUTION INTRAVENOUS at 01:12

## 2018-12-28 RX ADMIN — PROPOFOL 50 MCG/KG/MIN: 10 INJECTION, EMULSION INTRAVENOUS at 02:12

## 2018-12-28 RX ADMIN — CLINDAMYCIN PHOSPHATE 900 MG: 18 INJECTION, SOLUTION INTRAVENOUS at 01:12

## 2018-12-28 NOTE — PROGRESS NOTES
Patient from [facility] for same day surgery.   SW met with family (sisters Chloé, Felicia Romero and daughter Charlene Ray) while patient in recovery. They want to explore options due to feeling that patient wound care from the treatment team at this facility which sent him is not being followed.   The original option requested was home with home health. MAGED explained why this is very poor option at this time--need for daily medical monitoring difficult to provide (doctor visits, labs, medication changes).   Another option family asked was to explore if patient could receive care at [facility] as 2 sisters and daughter live in [place].  MAGED explained that SNF level of care may not be enough at this time, that insurance may not be contracted at the facility and due to being Friday afternoon, even if yes, unlikely to obtain an auth today. MAGED agreed to contact [facility] to explore option for future.   MAGED spoke with  Lolita at [facility] [phone] (after 4 pm). She agreed to review the medical and insurance information for the patient and family and provide feedback. This may be next week. [place] does have [employer] patients on case by case basis and after the new year, a more extensive contract. It does not have [employer] contract but if can provide care, would discuss with [employer]. MAGED updated family that the information sent per their request for review. MAGED then met with patient (aprox 4:40 pm).  SW explained meeting family, information from above. SW inquired if patient could explain to SW his own concerns.   Patient states that the wound care at [facility] is not what he understood what wound care JILLIAN Ashraf (here) and Dr Us wanted. Patient states he was reassessed and it is done differently. This worries him that wound is not responding as well. SW explained that Dr Chan is providing the orders for this discharge, can check that they are being followed.  "  Patient expressed confusion over insurance constraints with his medical care--that he felt he had good care at [facility], then had to change to another [facility] location for providers. SW explained this is not really something we can control at this time.   Patient expressed he does not understand why or how his infection became an "isolation" problem which requires him to stay in his room at the facility, unable to go out to therapy. (states does have therapists come to his room but feels confined, that does not have enough opportunity to exercise). SW explained that can contact the assigned infectious disease doctor talk with him regarding his infection, what happened in his body. SW enlisted aid of Dr Reyes to contact Dr Velez to request an ID doctor discuss this with patient. Dr Velez sent Dr Reyes return message acknowledging this to be done.   Results: patient and family all understand patient is best to return to LTAC today. They have this SW contact information. This SW will take responsibility on Monday to communicate with discharge planner at LTAC if patient wants, to look at upcoming plans for patient as he progresses. This SW will communicate with LTAC if [facility] becomes an option (however this would again be changing providers thus patient will need to think about this being one of his concerns).   MAGED updated with patient's RN who was to contact the LTAC regarding patient return. She is to call SW if needs assistance with ambulance.  "

## 2018-12-28 NOTE — PROGRESS NOTES
SW aware of patient in same day surgery for debridement. Unable to talk with patient as yet due to in surgical procedure. Will assist if needed for any change in plans. Patient remains admitted to LTAC for ongoing care including IV ATB's.

## 2018-12-28 NOTE — ANESTHESIA PROCEDURE NOTES
Peripheral Block    Patient location during procedure: pre-op    Reason for block: primary anesthetic   Diagnosis: Left ankle I and D   Start time: 12/28/2018 1:38 PM  Timeout: 12/28/2018 1:37 PM   End time: 12/28/2018 1:47 PM  Staffing  Anesthesiologist: Zulema Hernández MD  Performed: anesthesiologist   Preanesthetic Checklist  Completed: patient identified, site marked, surgical consent, pre-op evaluation, timeout performed, IV checked, risks and benefits discussed and monitors and equipment checked  Peripheral Block  Patient position: supine  Prep: ChloraPrep  Patient monitoring: heart rate, cardiac monitor, continuous pulse ox, continuous capnometry and frequent blood pressure checks  Block type: saphenous  Laterality: left  Injection technique: single shot  Needle  Needle type: Stimuplex   Needle gauge: 21 G  Needle length: 4 in  Needle localization: anatomical landmarks and ultrasound guidance  Catheter type: stimulating   -ultrasound image captured on disc.  Assessment  Injection assessment: negative aspiration, negative parasthesia and local visualized surrounding nerve  Paresthesia pain: none  Heart rate change: no  Slow fractionated injection: yes  Additional Notes  VSS.  [facility] RN monitoring vitals throughout procedure.  Patient tolerated procedure well.

## 2018-12-28 NOTE — TRANSFER OF CARE
"Anesthesia Transfer of Care Note    Patient: Marvin Ray    Procedure(s) Performed: Procedure(s) (LRB):  Exam under anesthesia, left foot debridement, left foot washout, possible left second through fifth metatarsal resection (Left)    Patient location: PACU    Anesthesia Type: regional and MAC    Transport from OR: Transported from OR on room air with adequate spontaneous ventilation    Post pain: adequate analgesia    Post assessment: no apparent anesthetic complications and tolerated procedure well    Post vital signs: stable    Level of consciousness: sedated and responds to stimulation    Nausea/Vomiting: no nausea/vomiting    Complications: none    Transfer of care protocol was followed      Last vitals:   Visit Vitals  /61 (BP Location: Left arm, Patient Position: Lying)   Pulse 70   Temp 36.2 °C (97.2 °F) (Tympanic)   Resp 12   Ht 5' 10" (1.778 m)   Wt 108.9 kg (240 lb)   SpO2 100%   BMI 34.44 kg/m²     "

## 2018-12-28 NOTE — ANESTHESIA PROCEDURE NOTES
Peripheral Block    Patient location during procedure: pre-op    Reason for block: primary anesthetic   Diagnosis: L ankle   Start time: 12/28/2018 1:48 PM  Timeout: 12/28/2018 1:47 PM   End time: 12/28/2018 1:58 PM  Staffing  Anesthesiologist: Zulema Hernández MD  Performed: anesthesiologist   Preanesthetic Checklist  Completed: patient identified, site marked, surgical consent, pre-op evaluation, timeout performed, IV checked, risks and benefits discussed and monitors and equipment checked  Peripheral Block  Patient position: supine  Prep: ChloraPrep  Patient monitoring: heart rate, cardiac monitor, continuous pulse ox, continuous capnometry and frequent blood pressure checks  Block type: popliteal  Laterality: left  Injection technique: single shot  Needle  Needle type: Stimuplex   Needle gauge: 21 G  Needle length: 4 in  Needle localization: anatomical landmarks and ultrasound guidance  Catheter type: stimulating   -ultrasound image captured on disc.  Assessment  Injection assessment: negative aspiration, negative parasthesia and local visualized surrounding nerve  Paresthesia pain: none  Heart rate change: no  Slow fractionated injection: yes  Additional Notes  VSS.  [facility] RN monitoring vitals throughout procedure.  Patient tolerated procedure well.

## 2018-12-28 NOTE — ANESTHESIA PREPROCEDURE EVALUATION
12/28/2018  Marvin Ray is a 58 y.o., male.    Anesthesia Evaluation    I have reviewed the Patient Summary Reports.    I have reviewed the Nursing Notes.   I have reviewed the Medications.     Review of Systems  Anesthesia Hx:  No problems with previous Anesthesia Denies Hx of Anesthetic complications  Neg history of prior surgery. Denies Family Hx of Anesthesia complications.   Denies Personal Hx of Anesthesia complications.   Social:  Former Smoker, Alcohol Use    Hematology/Oncology:  Hematology Normal   Oncology Normal     EENT/Dental:EENT/Dental Normal   Cardiovascular:   Exercise tolerance: good Pacemaker Hypertension Valvular problems/Murmurs, MR CAD   CHF PVD hyperlipidemia Echo 10/2016:  CONCLUSIONS     1 - Moderately depressed left ventricular systolic function (EF 35-40%).     2 - Quantitatively measured LV function is 38%.     3 - Left ventricular diastolic dysfunction.     4 - Mild mitral regurgitation.     5 - Severe left atrial enlargement.     6 - Pulmonary hypertension. The estimated PA systolic pressure is 54 mmHg.     7 - Mild pulmonic regurgitation.     8 - Mild tricuspid regurgitation.     9 - Right ventricular enlargement with normal systolic function.     10 - Small pericardial effusion.     11 - Intermediate central venous pressure.     Cardiac defibrillator in place   Pulmonary:  Pulmonary Normal    Renal/:   Chronic Renal Disease, CRI    Hepatic/GI:   No Bowel Prep. Denies PUD. Denies Hiatal Hernia.  Denies GERD. Liver Disease,  Denies Hepatitis.    Musculoskeletal:   Arthritis     Neurological:   Denies CVA. Neuromuscular Disease,  Denies Seizures.   Peripheral Neuropathy    Endocrine:   Diabetes, type 2, using insulin Denies Hypothyroidism. Denies Hyperthyroidism. Hgb a1c -8.2%   Dermatological:  Skin Normal    Psych:  Psychiatric Normal           Physical  Exam  General:  Obesity    Airway/Jaw/Neck:  AIRWAY FINDINGS: Normal      Chest/Lungs:  Chest/Lungs Clear    Heart/Vascular:  Heart Findings: Normal       Mental Status:  Mental Status Findings: Normal        Anesthesia Plan  Type of Anesthesia, risks & benefits discussed:  Anesthesia Type:  regional, general  Patient's Preference:   Intra-op Monitoring Plan: standard ASA monitors  Intra-op Monitoring Plan Comments:   Post Op Pain Control Plan:   Post Op Pain Control Plan Comments:   Induction:   IV  Beta Blocker:         Informed Consent:    ASA Score: 3     Day of Surgery Review of History & Physical:        Anesthesia Plan Notes: 58 yr old man w/ cardiac dz (reduced EF, Pulm HTN, MR, defibrillator), PVD and DM here nonhealing foot wound.   He has been done before under regional block.   Case is booked as Regional block; consider popliteal and/or ankle block        Ready For Surgery From Anesthesia Perspective.

## 2018-12-28 NOTE — BRIEF OP NOTE
[facility]  Brief Operative Note     SUMMARY     Surgery Date: 12/28/2018     Surgeon(s) and Role:     * Mitch Chan MD - Primary    Assisting Surgeon: None    Pre-op Diagnosis:  Open wound of left foot with complication, subsequent encounter [S91.302D]    Post-op Diagnosis:  Post-Op Diagnosis Codes:     * Open wound of left foot with complication, subsequent encounter [S91.302D]    Procedure(s) (LRB):  Exam under anesthesia, left foot debridement, left foot washout, possible left second through fifth metatarsal resection (Left)    Anesthesia: Regional    Description of the findings of the procedure: fibrinous tissue overlying wound    Findings/Key Components: good bleeding to tissue after debridement    Estimated Blood Loss: 5cc         Specimens:   Specimen (12h ago, onward)    Orders from this admission     Start     Ordered    12/28/18 1433  Specimen to Pathology - Surgery  Once     Comments:  Bone of left metatarsal     Start Status   12/28/18 1433 Collected (12/28/18 0423)       12/28/18 1432          Orders from suspended admission ([facility] - Denys Varela MD)     None                Discharge Note    SUMMARY     Admit Date: 12/28/2018    Discharge Date and Time:  12/28/2018 2:45 PM    Hospital Course (synopsis of major diagnoses, care, treatment, and services provided during the course of the hospital stay): No new issues or acute events postoperatively.        Final Diagnosis: Post-Op Diagnosis Codes:     * Open wound of left foot with complication, subsequent encounter [S91.302D]    Disposition: Home-Health Care [facility]    Follow Up/Patient Instructions: Resume regular diet, follow-up in 1 week, resume regular activity in 2-3 days.    Resume medications per post-procedure med reconciliation.      Medications:  Reconciled Home Medications:      Medication List      ASK your doctor about these medications    allopurinol 300 MG tablet  Commonly known as:   ZYLOPRIM  Take 1 tablet (300 mg total) by mouth once daily.     aspirin 81 MG Chew  Take 81 mg by mouth once daily.     brimonidine 0.1% 0.1 % Drop  Commonly known as:  ALPHAGAN P  Place 1 drop into both eyes 2 (two) times daily.     carvedilol 3.125 MG tablet  Commonly known as:  COREG  Take 1 tablet (3.125 mg total) by mouth 2 (two) times daily.     clopidogrel 75 mg tablet  Commonly known as:  PLAVIX  Take 1 tablet (75 mg total) by mouth once daily.     coenzyme Q10 100 mg capsule  Take 100 mg by mouth every morning.     collagenase ointment  Commonly known as:  SANTYL  Apply topically once daily. for 3 days     dextrose 5 % SolP 250 mL with vancomycin 1,000 mg SolR 1,250 mg  Inject 1,250 mg into the vein once daily.     dorzolamide 2 % ophthalmic solution  Commonly known as:  TRUSOPT  Place 1 drop into both eyes 3 (three) times daily.     dorzolamide-timolol 2-0.5% 22.3-6.8 mg/mL ophthalmic solution  Commonly known as:  COSOPT  Place 1 drop into both eyes 3 (three) times daily.     ezetimibe 10 mg tablet  Commonly known as:  ZETIA  Take 1 tablet (10 mg total) by mouth once daily.     fish oil-omega-3 fatty acids 300-1,000 mg capsule  Take 2 capsules (2 g total) by mouth 2 (two) times daily.     furosemide 80 MG tablet  Commonly known as:  LASIX  Take 1 tablet (80 mg total) by mouth 2 (two) times daily.     insulin aspart U-100 100 unit/mL Inpn pen  Commonly known as:  NovoLOG  Inject 3 Units into the skin 3 (three) times daily.     insulin detemir U-100 100 unit/mL (3 mL) Inpn pen  Commonly known as:  LEVEMIR FLEXTOUCH  Inject 10 Units into the skin every evening.     MEROPENEM IV  Inject 2,000 mg into the vein 3 (three) times daily.     metOLazone 2.5 MG tablet  Commonly known as:  ZAROXOLYN  Take 2.5 mg by mouth once daily.     miconazole NITRATE 2 % 2 % top powder  Commonly known as:  MICOTIN  Apply topically 2 (two) times daily.     multivitamin Tab  Take 1 tablet by mouth every morning.    "  oxyCODONE-acetaminophen 5-325 mg per tablet  Commonly known as:  PERCOCET  Take 1 tablet by mouth every 6 (six) hours as needed for Pain.     pen needle, diabetic 31 gauge x 1/4" Ndle  Use as directed     prednisoLONE acetate 1 % Drps  Commonly known as:  PRED FORTE  Place 1 drop into the right eye once daily. for 10 days     senna-docusate 8.6-50 mg 8.6-50 mg per tablet  Commonly known as:  PERICOLACE  Take 1 tablet by mouth 2 (two) times daily.     sodium chloride 0.9% SolP 100 mL with meropenem 1 gram SolR 2 g  Inject 2 g into the vein every 8 (eight) hours.     timolol maleate 0.5% 0.5 % Solg  Commonly known as:  TIMOPTIC-XE  Place 1 drop into both eyes 3 (three) times daily.          No discharge procedures on file.    "

## 2018-12-29 PROBLEM — J96.01 ACUTE RESPIRATORY FAILURE WITH HYPOXIA: Status: RESOLVED | Noted: 2018-12-11 | Resolved: 2018-12-29

## 2018-12-29 LAB
GRAM STN SPEC: NORMAL

## 2018-12-29 NOTE — DISCHARGE INSTRUCTIONS
DIET: You may resume your home diet.  (Diabetic /renal -sodium restriction  )  If nausea is present, increase your diet gradually with fluids and bland foods    ACTIVITY LEVEL: You have received sedation or an anesthetic, you may feel sleepy for several hours. Rest until you are more awake.    Non weight bearing to left lower extremity .         Medications: Pain medication should be taken only if needed and as directed. If antibiotics are prescribed, the medication should be taken until completed. You will be given an updated list of you medications.    No driving, alcoholic beverages or signing legal documents for next 24 hours or while taking pain medication.       CALL THE DOCTOR:    For any obvious bleeding (some dried blood over the incision is normal).      Redness, swelling, foul smell around incision or fever over 101.   Shortness of breath, Coughing up Bloody sputum, Pains or Swelling in your Calves .   Persistent pain or nausea not relieved by medication.    If any unusual problems or difficulties occur contact your doctor. If you cannot contact your doctor but feel your signs and symptoms warrant a physicians attention return to the emergency room.        Change dressing to left foot as per Dr Elise instructions  Comments     1. Left foot wound daily dressing with Santyl to wound, Normal saline soaked gauze over santyl with dry Kerlix light wrap to L foot.  2. Bilateral lower limb skin breakdown dressed with Xeroform gauze daily with light Kerlix wrap            / Also may call Pascale Adler skin/ostomy nurse at [facility] .(beeper  [phone]-  Change left  lower leg dressing  every day with xeroform gauze and Kerlix. Any questions call Dr Elise ,vascular surgeon at  [phone] , cell [phone] .

## 2018-12-29 NOTE — PLAN OF CARE
Report called to nurseLucero at [facility] . He is to return to LTAC this evening. Call to Baldemar at [facility] for ambulance pickup to return to extended care.

## 2018-12-30 NOTE — OP NOTE
DATE OF PROCEDURE:  12/28/2018    SERVICE:  Vascular Surgery.    SURGEON:  Mitch Chan M.D.    ASSISTANT:  None.    PREOPERATIVE DIAGNOSES:  1.  Left lower extremity atherosclerosis with left foot wound including necrosis   of underlying bone.  2.  Hypertension.  3.  Diabetes mellitus.  4.  Hyperlipidemia.  5.  History of cardiac arrest 12/2018.    POSTOPERATIVE DIAGNOSIS:  1.  Left lower extremity atherosclerosis with left foot wound including necrosis   of underlying bone.  2.  Hypertension.  3.  Diabetes mellitus.  4.  Hyperlipidemia.  5.  History of cardiac arrest 12/2018.    PROCEDURES PERFORMED:  1.  Left foot wound debridement down to bone, wound measurements 22 cm x 15 cm x   0.5 cm.  2.  Left foot wound washout with Pulsavac.  3.  Left second through fifth metatarsal resection.    INDICATIONS FOR PROCEDURE:  This 58-year-old white male with above listed   medical history who was seen initially three months ago as an inpatient   consultation for left lower extremity cellulitis.  He was managed   conservatively, although his wound progressed when he followed up as an   outpatient.  He underwent left lower extremity angioplasty with improvement in   his left lower extremity perfusion and subsequent toe amputations and foot   debridement.  He has had subsequent foot debridement at bedside and in the   Operating Room and was started on IV antibiotics.  During his recent   hospitalization, he had an episode of cardiac arrest requiring ACLS with return   of spontaneous circulation.  His wound was managed conservatively due to his   cardiac risk and he was discharged to [facility] with IV antibiotics and wound care.    He was scheduled for outpatient debridement for which he presents today, for   limb salvage.    DESCRIPTION OF PROCEDURE:  The patient was seen preoperatively and was deemed   stable for surgery.  His vital signs were stable.  He was without complaints.    He was brought to the Operating Room, placed  supine on the operating table.    Preoperatively, a regional block was performed to the left lower extremity by   Anesthesia.  The patient was prepped and draped in sterile fashion.  Timeout was   performed.  He was given preoperative antibiotics.  The left foot wound was   debrided with a scalpel and a curette.  Bleeding was noted to all surrounding   tissue.  There is no evidence of deep infection.  All fibrinous tissue was   removed as well as the eschar on his left heel.  The left second through fifth   metatarsals were resected back with a rongeur.  The wound was debrided down to   the level of the bone.  All necrotic tissue was removed.  The wound was then   irrigated with bacitracin-soaked saline, Pulsavac.  Cultures were obtained prior   to irrigation and the bone was sent as micro specimen as well as pathology.    The wound was then dressed with a sterile Betadine soaked gauze dressing with   overlying dry Kerlix and Ace bandage.  He was transferred to the stretcher and   subsequently to Same Day Surgery without any issues.    COMPLICATIONS:  None.    ESTIMATED BLOOD LOSS:  10 mL.    ANESTHESIA:  Regional.      MIKAELA/KAREN  dd: 12/29/2018 10:25:10 (CST)  td: 12/29/2018 22:16:46 (CST)  Doc ID   #[id number]  Job ID #[id number]    CC:

## 2018-12-31 LAB
BACTERIA SPEC AEROBE CULT: NORMAL
BACTERIA SPEC AEROBE CULT: NORMAL
POCT GLUCOSE: 141 MG/DL (ref 70–110)
POCT GLUCOSE: 98 MG/DL (ref 70–110)

## 2018-12-31 NOTE — ANESTHESIA POSTPROCEDURE EVALUATION
"Anesthesia Post Evaluation    Patient: Marvin Ray    Procedure(s) Performed: Procedure(s) (LRB):  Exam under anesthesia, left foot debridement, left foot washout, possible left second through fifth metatarsal resection (Left)  DEBRIDEMENT, FOOT  EXCISION, LESION, METATARSAL BONE    Final Anesthesia Type: regional  Patient location during evaluation: PACU  Patient participation: Yes- Able to Participate  Level of consciousness: awake and alert, oriented and awake  Post-procedure vital signs: reviewed and stable  Airway patency: patent  PONV status at discharge: No PONV  Anesthetic complications: no      Cardiovascular status: blood pressure returned to baseline  Respiratory status: unassisted, spontaneous ventilation and room air  Hydration status: euvolemic  Follow-up not needed.        Visit Vitals  /65   Pulse 81   Temp 36.4 °C (97.5 °F) (Oral)   Resp 18   Ht 5' 10" (1.778 m)   Wt 108.9 kg (240 lb)   SpO2 97%   BMI 34.44 kg/m²       Pain/Ralf Score: Pain Rating Prior to Med Admin: 7 (12/30/2018 10:11 PM)  Pain Rating Post Med Admin: 5 (12/30/2018 11:11 PM)        "

## 2019-01-01 LAB — BACTERIA SPEC ANAEROBE CULT: NORMAL

## 2019-01-02 ENCOUNTER — TELEPHONE (OUTPATIENT)
Dept: FAMILY MEDICINE | Facility: CLINIC | Age: 59
End: 2019-01-02

## 2019-01-02 LAB — BACTERIA SPEC ANAEROBE CULT: NORMAL

## 2019-01-02 NOTE — TELEPHONE ENCOUNTER
----- Message from Rubén Davis MD sent at 1/2/2019 10:48 AM CST -----  Contact: Pt's Sister - Chloé  Please provide letter that due to his health he cannot do jury duty    Thanks  Dr davis   ----- Message -----  From: Eunice North MA  Sent: 1/2/2019  10:36 AM  To: Rubén Davis MD    Ok to do letter?  ----- Message -----  From: Hetal Meadows  Sent: 1/2/2019  10:18 AM  To: Susan Montana Staff    Pt has a jury summons 1/7/19. He is currently in the hospital & won't be able to make it. He needs a letter stating that he's not available for jury duty. Please call his sister, Chloé when it's ready or if there's any issue at [phone].

## 2019-01-02 NOTE — LETTER
January 2, 2019    Marvin Ray  [address] [place] [address]             [address] [address]  Phone: [phone] To Whom it may concern:    Re: Mr. Marvin Ray     Due to Mr. Ray's health he is not able to do jury duty    If you have any questions or concerns, please don't hesitate to call [phone].    Sincerely,        Eunice North MA

## 2019-01-03 LAB
BACTERIA ISLT: NORMAL

## 2019-01-04 ENCOUNTER — OFFICE VISIT (OUTPATIENT)
Dept: VASCULAR SURGERY | Facility: CLINIC | Age: 59
End: 2019-01-04
Payer: COMMERCIAL

## 2019-01-04 VITALS
WEIGHT: 236.75 LBS | HEIGHT: 70 IN | BODY MASS INDEX: 33.89 KG/M2 | SYSTOLIC BLOOD PRESSURE: 108 MMHG | DIASTOLIC BLOOD PRESSURE: 62 MMHG

## 2019-01-04 DIAGNOSIS — I70.245 ATHEROSCLEROSIS OF NATIVE ARTERY OF LEFT LOWER EXTREMITY WITH ULCERATION OF OTHER PART OF FOOT: Primary | ICD-10-CM

## 2019-01-04 PROCEDURE — 99024 PR POST-OP FOLLOW-UP VISIT: ICD-10-PCS | Mod: S$GLB,,, | Performed by: SURGERY

## 2019-01-04 PROCEDURE — 3008F PR BODY MASS INDEX (BMI) DOCUMENTED: ICD-10-PCS | Mod: CPTII,S$GLB,, | Performed by: SURGERY

## 2019-01-04 PROCEDURE — 3078F PR MOST RECENT DIASTOLIC BLOOD PRESSURE < 80 MM HG: ICD-10-PCS | Mod: CPTII,S$GLB,, | Performed by: SURGERY

## 2019-01-04 PROCEDURE — 3074F SYST BP LT 130 MM HG: CPT | Mod: CPTII,S$GLB,, | Performed by: SURGERY

## 2019-01-04 PROCEDURE — 99024 POSTOP FOLLOW-UP VISIT: CPT | Mod: S$GLB,,, | Performed by: SURGERY

## 2019-01-04 PROCEDURE — 3008F BODY MASS INDEX DOCD: CPT | Mod: CPTII,S$GLB,, | Performed by: SURGERY

## 2019-01-04 PROCEDURE — 99999 PR PBB SHADOW E&M-EST. PATIENT-LVL III: ICD-10-PCS | Mod: PBBFAC,,, | Performed by: SURGERY

## 2019-01-04 PROCEDURE — 99999 PR PBB SHADOW E&M-EST. PATIENT-LVL III: CPT | Mod: PBBFAC,,, | Performed by: SURGERY

## 2019-01-04 PROCEDURE — 3074F PR MOST RECENT SYSTOLIC BLOOD PRESSURE < 130 MM HG: ICD-10-PCS | Mod: CPTII,S$GLB,, | Performed by: SURGERY

## 2019-01-04 PROCEDURE — 3078F DIAST BP <80 MM HG: CPT | Mod: CPTII,S$GLB,, | Performed by: SURGERY

## 2019-01-04 NOTE — PATIENT INSTRUCTIONS
Wound Care  Taking proper care of your wound will help it heal. Your healthcare provider may show you how to clean and dress the wound. He or she will also explain how to tell if the wound is healing normally. If you are unsure of how to take care of the wound, be sure to clarify what dressing to use and how often you should change the bandages. Here are the basic steps.     A wound that's not healing normally may be dark in color or have white streaks.   Wash your hands  Tips for washing your hands include:  · Use liquid soap and lather for 2 minutes. Scrub between your fingers and under your nails.  · Rinse with warm water, keeping your fingers pointing down.  · Use a paper towel to dry your hands and to turn off the faucet.  Remove the used dressing  Here are suggestions for removing the dressing:  · If dressing changes cause you pain, be sure to take your pain medicine as prescribed by your healthcare provider 30 minutes before dressing changes.  · Set up your supplies.  · Put on disposable gloves if youre dressing a wound for someone else or your wound is infected.  · Loosen the tape by pulling gently toward the wound.  · Gently take off the old dressing. If the dressing is stuck to the wound, moisten it with saline (if available) or clean water.  · If you have a drain or tube in the wound, be careful not to pull on it.  · Remove the dressing 1 layer at a time and put it in a plastic bag.  · Remove your gloves.  Inspect and dress the wound  Check the wound carefully:  · Each time you change the dressing, inspect the wound carefully to be sure its healing normally by making sure your wound appears to be pink and moist, and is free of infection.    · Wash your hands again. Put on a new pair of gloves.  · Clean and dress the wound as directed by your healthcare provider or nurse. Do not put anything in the wound that is not prescribed or directed by your healthcare provider. If you have a drain or tube, be  careful not to pull on it. Make sure to secure the drain or tube as well.  · Put all supplies in a plastic bag. Seal the bag and put it in the trash.  · Be sure to wash your hands again.  Call your healthcare provider  Call your healthcare provider if you see any of the following signs of a problem:  · Bleeding that soaks the dressing  · Pink fluid weeping from the wound  · Increased drainage or drainage that is yellow, yellow-green, or foul-smelling  · Increased swelling or pain, or redness or swelling in the skin around the wound  · A change in the color of the wound, or if streaks develop in a direction away from the wound  · The area between any stitches opens up  · An increase in the size of the wound  · A fever of 100.4°F (38°C) or higher, or as directed by your healthcare provider  · Chills, increased fatigue, or a loss of appetite      Date Last Reviewed: 7/30/2015  © 9071-7644 [employer]. [address] [place] [address], [place] [address]. All rights reserved. This information is not intended as a substitute for professional medical care. Always follow your healthcare professional's instructions.

## 2019-01-04 NOTE — LETTER
January 4, 2019        Rubén Davis MD  [address] [facility] [address]             [facility] Vascular Surgery  [address]  [facility] [address]  Phone: [phone]  Fax: [phone]   Patient: Marvin Ray   MR Number: [id number]   YOB: 1960   Date of Visit: 1/4/2019       Dear Dr. Davis:    Thank you for referring Marvin Ray to me for evaluation. Below are the relevant portions of my assessment and plan of care.            If you have questions, please do not hesitate to call me. I look forward to following Marvin along with you.    Sincerely,      Mitch Chan MD           CC  No Recipients

## 2019-01-04 NOTE — PROGRESS NOTES
Mitch Chan MD [facility]                         01/04/2019    HPI:  Marvin Ray is a 58 y.o. male with   Patient Active Problem List   Diagnosis    Epiretinal membrane, both eyes    Nuclear sclerotic cataract of right eye    Pseudophakia, left eye    Ptosis, left eyelid    DM type 2 with diabetic peripheral neuropathy    HLD (hyperlipidemia)    Neovascular glaucoma of both eyes    Tophaceous gout    Essential hypertension    CAD (coronary artery disease)    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 edema, with long-term current use of insulin    Neovascular glaucoma of left eye, severe stage    Statin myopathy    Neovascular glaucoma, right eye, mild stage    Left leg cellulitis    PVD (peripheral vascular disease)    Right wrist pain    Multiple open wounds of lower leg    Hepatosplenomegaly    Acute on chronic combined systolic and diastolic heart failure    Non-pressure chronic ulcer of other part of left foot with fat layer exposed    Type 2 diabetes mellitus with left diabetic foot ulcer    Open wound of left foot    Acute renal failure superimposed on stage 3 chronic kidney disease    Cellulitis of left lower extremity    Diabetic foot infection    Cardiac arrest    Other ascites    Severe malnutrition    Goals of care, counseling/discussion    Alteration in skin integrity    MDR Acinetobacter baumannii infection    Takes dietary supplements    Intertriginous dermatitis associated with moisture    Blisters of multiple sites    Debility    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  Seen in hospital last mo with LLE cellulitis.  Treated with Abx.  Also had paracentesis for ascites with negative w/u per family.  Patient states location is L foot  occurring for 1-2 mo, worsening foot wound although improvement in edema and erythema.  Associated signs and symptoms include pain and edema.  Quality is aching and severity is 5/10.  Symptoms began 10/2018 spontaneously with blistering and severe edema.  Alleviating factors include wound care and elevation.  Worsening factors include pressure.    Tobacco use: denies    Interval history: s/p LLE angioplasty and multiple subsequent OR and bedside debridements.  On IV Abx per culture results.  Glucose better controlled.  No fevers.  Receiving local wound care with Santyl.    Past Medical History:   Diagnosis Date    Arthritis     Cellulitis     CKD (chronic kidney disease), stage III     Coronary artery disease     Diabetes mellitus     Diabetic retinopathy     Diabetic ulcer of left foot     Glaucoma     Gout     Hyperlipemia     Hypertension     ICD (implantable cardioverter-defibrillator) in place 11/02/2018    Left chest    Non-pressure chronic ulcer of other part of left foot with fat layer exposed 10/23/2018    PVD (peripheral vascular disease)     Type 2 diabetes mellitus with left diabetic foot ulcer 10/29/2018    Unsteady gait     uses a wheelchair     Past Surgical History:   Procedure Laterality Date    AHMED GLAUCOMA IMPLANT Left 2011    DONE AT [facility]    AMPUTATION, TOE Left 12/5/2018    [facility] at [facility]    AMPUTATION, TOES  2-5 Left 11/16/2018    [facility] by Mitch [facility] at [facility] OR    BAERVELDT GLAUCOMA IMPLANT Left 2012    WITH CATARACT EXTRACTION//DONE AT [facility]    CARDIAC CATHETERIZATION Left 05/2016    CARDIAC DEFIBRILLATOR PLACEMENT Left 11/02/2018    CATARACT EXTRACTION W/  INTRAOCULAR LENS IMPLANT Left 2012    WITH BAERVEDT//DONE AT [facility]    CATARACT EXTRACTION W/  INTRAOCULAR LENS IMPLANT Right 09/26/2018    COMPLEX ()    CB DESTRUCTION WITH CYCLO G6 Left 02/15/2017        CYST REMOVAL       DEBRIDEMENT, FOOT  12/28/2018    Per[facility] by [facility] at [facility] OR    Exam under anesthesia, left foot debridement, left foot washout, possible left second through fifth metatarsal resection Left 12/28/2018    Performed by Mitch Chan [facility] at [facility] OR    Exam under anesthesia, left foot debridement, washout and all other indicated procedures Left 12/5/2018    Performed by Mitch Chan [facility] at [facility] OR    EXCISION, LESION, METATARSAL BONE  12/28/2018    Performed by Mitch Chan MD at [facility] OR    HEART CATH-LEFT N/A 5/6/2016    Performed by Mike Magana MD at [facility] CATH LAB    INCISION AND DRAINAGE Left 11/16/2018    Performed by Mitch Chan MD at [facility] OR    Incision and Drainage, left lower extremity debridement, washout Left 11/14/2018    Performed by Mitch Chan MD at [facility] OR    INSERTION, ICD GENERATOR, SINGLE CHAMBER N/A 11/2/2018    Performed by Pernell Greer MD at [facility] CATH LAB    INSERTION, IOL PROSTHESIS Right 9/26/2018    Performed by Perla Cortés MD at [facility] OR 1ST FLR    PHACOEMULSIFICATION, CATARACT Right 9/26/2018    Performed by Perla Cortés MD at [facility] OR 1ST FLR    Right foot surgery  10/2014    TOE AMPUTATION Right     first and second    TONSILLECTOMY      TRABECULECTOMY/G 6 LASER Left 2/15/2017    Performed by Perla Cortés MD at [facility] OR 1ST FLR     Family History   Problem Relation Age of Onset    Diabetes Mother     Heart disease Brother     No Known Problems Father     No Known Problems Sister     No Known Problems Maternal Aunt     No Known Problems Maternal Uncle     No Known Problems Paternal Aunt     No Known Problems Paternal Uncle     No Known Problems Maternal Grandmother     No Known Problems Maternal Grandfather     No Known Problems Paternal Grandmother     No Known Problems Paternal Grandfather     Anemia Neg Hx     Arrhythmia Neg Hx     Asthma Neg Hx     Clotting disorder Neg  Hx     Fainting Neg Hx     Heart attack Neg Hx     Heart failure Neg Hx     Hyperlipidemia Neg Hx     Hypertension Neg Hx     Stroke Neg Hx     Atrial Septal Defect Neg Hx     Amblyopia Neg Hx     Blindness Neg Hx     Glaucoma Neg Hx     Macular degeneration Neg Hx     Retinal detachment Neg Hx     Strabismus Neg Hx      Social History     Socioeconomic History    Marital status: Legally      Spouse name: Not on file    Number of children: Not on file    Years of education: 14    Highest education level: Not on file   Social Needs    Financial resource strain: Not on file    Food insecurity - worry: Not on file    Food insecurity - inability: Not on file    Transportation needs - medical: Not on file    Transportation needs - non-medical: Not on file   Occupational History    Not on file   Tobacco Use    Smoking status: Former Smoker     Packs/day: 1.00     Years: 3.00     Pack years: 3.00     Types: Cigarettes     Last attempt to quit: 1984     Years since quittin.5    Smokeless tobacco: Never Used   Substance and Sexual Activity    Alcohol use: Yes     Alcohol/week: 0.6 oz     Types: 1 Cans of beer per week     Comment: Occasional    Drug use: No    Sexual activity: Not Currently   Other Topics Concern    Not on file   Social History Narrative    Not on file     No current facility-administered medications for this visit.     Current Outpatient Medications:     furosemide (LASIX) 80 MG tablet, Take 1 tablet (80 mg total) by mouth 2 (two) times daily., Disp: 60 tablet, Rfl: 11    Facility-Administered Medications Ordered in Other Visits:     acetaminophen tablet 650 mg, 650 mg, Oral, Q6H PRN, Nick Beltrán NP    allopurinol tablet 300 mg, 300 mg, Oral, Daily, Nick Beltrán NP, 300 mg at 19 0812    aspirin chewable tablet 81 mg, 81 mg, Oral, Daily, Nick Beltrán NP, 81 mg at 19 0938    brimonidine 0.2% ophthalmic solution 1  drop, 1 drop, Both Eyes, BID, Nick Beltrán NP, 1 drop at 01/04/19 0810    clopidogrel tablet 75 mg, 75 mg, Oral, Daily, Nick Beltrán, SCOTT, 75 mg at 01/04/19 0812    coenzyme Q10 100 mg, 100 mg, Oral, Daily with breakfast, Nick Beltrán NP, 100 mg at 01/04/19 0813    collagenase ointment, , Topical (Top), Daily, Nick Beltrán NP    dextrose 50% injection 12.5 g, 12.5 g, Intravenous, PRN, Nick Beltrán NP    dextrose 50% injection 25 g, 25 g, Intravenous, PRN, Nick Beltrán NP    dorzolamide 2 % ophthalmic solution 1 drop, 1 drop, Both Eyes, TID, Sara Ching MD, 1 drop at 01/04/19 0810    enoxaparin injection 40 mg, 40 mg, Subcutaneous, Daily, Denys Varela MD, 40 mg at 01/03/19 1655    ezetimibe tablet 10 mg, 10 mg, Oral, Daily, Nick Beltrán NP, 10 mg at 01/04/19 0812    furosemide injection 40 mg, 40 mg, Intravenous, BID, Sara Ching MD, 40 mg at 01/04/19 0800    glucagon (human recombinant) injection 1 mg, 1 mg, Intramuscular, PRN, Nick Beltrán NP    glucose chewable tablet 16 g, 16 g, Oral, PRN, Nick Beltrán NP    glucose chewable tablet 24 g, 24 g, Oral, PRN, Nick Beltrán NP    insulin aspart U-100 pen 1-10 Units, 1-10 Units, Subcutaneous, QID (AC + HS) PRN, Nick Beltrán NP, 2 Units at 01/03/19 2232    insulin aspart U-100 pen 3 Units, 3 Units, Subcutaneous, TIDWM, Nick Beltrán NP, 3 Units at 01/04/19 0641    insulin detemir U-100 pen 10 Units, 10 Units, Subcutaneous, QHS, Nick Beltrán, NP, 10 Units at 01/03/19 2220    lactated ringers infusion, , Intravenous, Continuous, Tam Reynolds MD    lidocaine (PF) 10 mg/ml (1%) injection 10 mg, 1 mL, Intradermal, Once, Tam Reynolds MD    linezolid tablet 600 mg, 600 mg, Oral, Q12H, Jorgito Velez MD, 600 mg at 01/04/19 0812    meropenem (MERREM) 2 g in sodium chloride 0.9% 100 mL IVPB, 2 g, Intravenous, Q8H, Sara ANDRADE  MD Humza, Last Rate: 100 mL/hr at 01/04/19 0637, 2 g at 01/04/19 0637    metOLazone tablet 5 mg, 5 mg, Oral, Q24H, Sara Ching MD    miconazole NITRATE 2 % top powder, , Topical (Top), BID, Nick Beltrán NP    multivitamin per tablet 1 tablet, 1 tablet, Oral, Daily, Sara Ching MD, 1 tablet at 01/04/19 0812    omega 3-dha-epa-fish oil 1,000 mg (120 mg-180 mg) Cap 1 capsule, 1 capsule, Oral, BID, Nick Beltrán NP, 1 capsule at 01/04/19 0810    ondansetron injection 4 mg, 4 mg, Intravenous, Q6H PRN, Nick Beltrán NP    oxyCODONE-acetaminophen 5-325 mg per tablet 1 tablet, 1 tablet, Oral, Q6H PRN, Nick Beltrán NP, 1 tablet at 01/03/19 2229    prednisoLONE acetate 1 % ophthalmic suspension 1 drop, 1 drop, Right Eye, Daily, Nick Beltrán NP, 1 drop at 01/04/19 0810    ramelteon tablet 8 mg, 8 mg, Oral, Nightly PRN, Nick Beltrán NP, 8 mg at 01/03/19 2229    senna-docusate 8.6-50 mg per tablet 1 tablet, 1 tablet, Oral, BID PRN, Sara Ching MD, 1 tablet at 12/31/18 2225    Flushing PICC Protocol, , , Until Discontinued **AND** sodium chloride 0.9% flush 10 mL, 10 mL, Intravenous, Q6H, 10 mL at 01/04/19 0600 **AND** sodium chloride 0.9% flush 10 mL, 10 mL, Intravenous, PRN, Nick Beltrán NP    timolol maleate 0.5% ophthalmic solution 1 drop, 1 drop, Both Eyes, TID, Sara Ching MD, 1 drop at 01/04/19 0810    REVIEW OF SYSTEMS:  General: No fevers or chills; ENT: No sore throat; Allergy and Immunology: no persistent infections; Hematological and Lymphatic: No history of bleeding or easy bruising; Endocrine: negative; Respiratory: no cough, shortness of breath, or wheezing; Cardiovascular: no chest pain or dyspnea on exertion; Gastrointestinal: no abdominal pain/back, change in bowel habits, or bloody stools; Genito-Urinary: no dysuria, trouble voiding, or hematuria; Musculoskeletal: negative; Neurological: no TIA or stroke symptoms;  Psychiatric: no nervousness, anxiety or depression.    PHYSICAL EXAM:                General appearance:  Alert, well-appearing, and in no distress.  Oriented to person, place, and time                    Neurological: Normal speech, no focal findings noted; CN II - XII grossly intact. RLE with sensation to light touch, LLE with sensation to light touch.            Musculoskeletal: Digits/nail without cyanosis/clubbing.  Strength 5/5 BLE.                    Neck: Supple, no significant adenopathy                  Chest:  Clear to auscultation, no wheezes, rales or rhonchi, symmetric air entry. No use of accessory muscles               Cardiac: Normal rate and regular rhythm, S1 and S2 normal            Abdomen: Soft, nontender, nondistended, no masses or organomegaly, no hernia     No rebound tenderness noted; bowel sounds normal     No groin adenopathy      Extremities:   2+ R femoral pulse, 2+ L femoral pulse     doppler+ R popliteal pulse, doppler+ L popliteal pulse     doppler+ R PT pulse, doppler+ L PT pulse     doppler+ R DP pulse, doppler+ L DP pulse     1+ RLE edema, 2+ LLE edema    Skin: RLE with ulcer to anterior shin; LLE superficial anterior lower leg wounds without infection, + large foot wound with fibrinous and granulation tissue, no odor, no purulence or erythema; improvement since last evaluation    LAB RESULTS:  No results found for: CBC  Lab Results   Component Value Date    LABPROT 11.2 12/27/2018    INR 1.1 12/27/2018     Lab Results   Component Value Date     01/04/2019    K 4.4 01/04/2019    CL 92 (L) 01/04/2019    CO2 33 (H) 01/04/2019     (H) 01/04/2019    BUN 51 (H) 01/04/2019    CREATININE 0.9 01/04/2019    CALCIUM 9.0 01/04/2019    ANIONGAP 11 01/04/2019    EGFRNONAA >60.0 01/04/2019     Lab Results   Component Value Date    WBC 6.86 01/04/2019    RBC 3.18 (L) 01/04/2019    HGB 9.0 (L) 01/04/2019    HCT 29.9 (L) 01/04/2019    MCV 94 01/04/2019    MCH 28.3 01/04/2019    [facility]  30.1 (L) 01/04/2019    RDW 16.5 (H) 01/04/2019     01/04/2019    MPV 9.4 01/04/2019    GRAN 4.9 01/04/2019    GRAN 71.0 01/04/2019    LYMPH 0.6 (L) 01/04/2019    LYMPH 8.7 (L) 01/04/2019    MONO 0.9 01/04/2019    MONO 13.0 01/04/2019    EOS 0.4 01/04/2019    BASO 0.09 01/04/2019    EOSINOPHIL 5.7 01/04/2019    BASOPHIL 1.3 01/04/2019    DIFFMETHOD Automated 01/04/2019     .  Lab Results   Component Value Date    HGBA1C 8.3 (H) 10/30/2018       IMAGING:  All pertinent imaging has been reviewed and interpreted independently.    IMP/PLAN:  58 y.o. male with   Patient Active Problem List   Diagnosis    Epiretinal membrane, both eyes    Nuclear sclerotic cataract of right eye    Pseudophakia, left eye    Ptosis, left eyelid    DM type 2 with diabetic peripheral neuropathy    HLD (hyperlipidemia)    Neovascular glaucoma of both eyes    Tophaceous gout    Essential hypertension    CAD (coronary artery disease)    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 edema, with long-term current use of insulin    Neovascular glaucoma of left eye, severe stage    Statin myopathy    Neovascular glaucoma, right eye, mild stage    Left leg cellulitis    PVD (peripheral vascular disease)    Right wrist pain    Multiple open wounds of lower leg    Hepatosplenomegaly    Acute on chronic combined systolic and diastolic heart failure    Non-pressure chronic ulcer of other part of left foot with fat layer exposed    Type 2 diabetes mellitus with left diabetic foot ulcer    Open wound of left foot    Acute renal failure superimposed on stage 3 chronic kidney disease    Cellulitis of left lower extremity    Diabetic foot infection    Cardiac arrest    Other ascites    Severe malnutrition    Goals of care, counseling/discussion    Alteration in skin integrity    MDR Acinetobacter baumannii infection    Takes dietary supplements    Intertriginous  dermatitis associated with moisture    Blisters of multiple sites    Debility    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    -L foot wound slowly healing, multifactorial due to diabetes, PVD and venous insufficiency with improvement in granulation tissue and control of infection - rec cont local wound care with Santyl and if no improvement in next week, can switch to Algidex per wound care RN recs  -Will obtain SIDRA/TP at next visit to further eval LLE perfusion after angioplasty 11/16/18  -Wound care   -Strict glycemic control  -Cont Abx per ID recs  -RTC 1-2 weeks for continued mgmt    I spent 20 minutes evaluating this patient and greater than 50% of the time was spent counseling, coordinator care and discussing the plan of care.  All questions were answered and patient stated understanding with agreement with the above treatment plan.    Mitch Chan MD [facility]  Vascular and Endovascular Surgery

## 2019-01-09 ENCOUNTER — TELEPHONE (OUTPATIENT)
Dept: INTERVENTIONAL RADIOLOGY/VASCULAR | Facility: HOSPITAL | Age: 59
End: 2019-01-09

## 2019-01-10 ENCOUNTER — HOSPITAL ENCOUNTER (OUTPATIENT)
Dept: INTERVENTIONAL RADIOLOGY/VASCULAR | Facility: HOSPITAL | Age: 59
Discharge: HOME | End: 2019-01-10
Attending: INTERNAL MEDICINE
Payer: COMMERCIAL

## 2019-01-10 VITALS
HEART RATE: 71 BPM | OXYGEN SATURATION: 99 % | SYSTOLIC BLOOD PRESSURE: 108 MMHG | DIASTOLIC BLOOD PRESSURE: 54 MMHG | RESPIRATION RATE: 17 BRPM

## 2019-01-10 PROCEDURE — 49083 ABD PARACENTESIS W/IMAGING: CPT | Mod: ,,, | Performed by: FAMILY MEDICINE

## 2019-01-10 PROCEDURE — 49083 IR PARACENTESIS WITH IMAGING: ICD-10-PCS | Mod: ,,, | Performed by: FAMILY MEDICINE

## 2019-01-10 PROCEDURE — A7048 VACUUM DRAIN BOTTLE/TUBE KIT: HCPCS

## 2019-01-10 PROCEDURE — 49083 ABD PARACENTESIS W/IMAGING: CPT

## 2019-01-10 NOTE — H&P
Inpatient Radiology Pre-procedure Note    History of Present Illness:  Marvin Ray is a 58 y.o. male who presents for ultrasound guided paracentesis.  Admission H&P reviewed.  Past Medical History:   Diagnosis Date    Arthritis     Cellulitis     CKD (chronic kidney disease), stage III     Coronary artery disease     Diabetes mellitus     Diabetic retinopathy     Diabetic ulcer of left foot     Glaucoma     Gout     Hyperlipemia     Hypertension     ICD (implantable cardioverter-defibrillator) in place 11/02/2018    Left chest    Non-pressure chronic ulcer of other part of left foot with fat layer exposed 10/23/2018    PVD (peripheral vascular disease)     Type 2 diabetes mellitus with left diabetic foot ulcer 10/29/2018    Unsteady gait     uses a wheelchair     Past Surgical History:   Procedure Laterality Date    AHMED GLAUCOMA IMPLANT Left 2011    DONE AT [facility]    AMPUTATION, TOE Left 12/5/2018    [facility]    AMPUTATION, TOES  2-5 Left 11/16/2018    [facility]formed by Mitch Chan MD at [facility]    BAERVELDT GLAUCOMA IMPLANT Left 2012    WITH CATARACT EXTRACTION//DONE AT [facility]    CARDIAC CATHETERIZATION Left 05/2016    CARDIAC DEFIBRILLATOR PLACEMENT Left 11/02/2018    CATARACT EXTRACTION W/  INTRAOCULAR LENS IMPLANT Left 2012    WITH BAERVEDT//DONE AT [facility]    CATARACT EXTRACTION W/  INTRAOCULAR LENS IMPLANT Right 09/26/2018    COMPLEX ()    CB DESTRUCTION WITH CYCLO G6 Left 02/15/2017        CYST REMOVAL      DEBRIDEMENT, FOOT  12/28/2018    Performed by Mitch Chan MD at [facility]    Exam under anesthesia, left foot debridement, left foot washout, possible left second through fifth metatarsal resection Left 12/28/2018    Performed by Mitch Chan MD at [facility]    Exam under anesthesia, left foot debridement, washout and all other indicated procedures Left 12/5/2018    Performed by Mitch  HERSON Chan MD at [facility] OR    EXCISION, LESION, METATARSAL BONE  12/28/2018    Performed by Mitch Chan MD at [facility] OR    HEART CATH-LEFT N/A 5/6/2016    Performed by Mike Magana MD at [facility] CATH LAB    INCISION AND DRAINAGE Left 11/16/2018    Performed by Mitch Chan MD at [facility] OR    Incision and Drainage, left lower extremity debridement, washout Left 11/14/2018    Performed by Mitch Chan MD at [facility] OR    INSERTION, ICD GENERATOR, SINGLE CHAMBER N/A 11/2/2018    Performed by Pernell Greer MD at [facility] CATH LAB    INSERTION, IOL PROSTHESIS Right 9/26/2018    Performed by Perla Cortés MD at [facility] OR 1ST FLR    PHACOEMULSIFICATION, CATARACT Right 9/26/2018    Performed by Perla Cortés MD at [facility] OR 1ST FLR    Right foot surgery  10/2014    TOE AMPUTATION Right     first and second    TONSILLECTOMY      TRABECULECTOMY/G 6 LASER Left 2/15/2017    Performed by Perla Cortés MD at [facility] OR 1ST FLR       Review of Systems:   As documented in primary team H&P    Home Meds:   Prior to Admission medications    Medication Sig Start Date End Date Taking? Authorizing Provider   allopurinol (ZYLOPRIM) 300 MG tablet Take 1 tablet (300 mg total) by mouth once daily. 12/20/18   Kirsten Trinh MD   aspirin 81 MG Chew Take 81 mg by mouth once daily.    Historical Provider, MD   brimonidine 0.1% (ALPHAGAN P) 0.1 % Drop Place 1 drop into both eyes 2 (two) times daily. 12/19/18 12/19/19  Kirsten Trinh MD   carvedilol (COREG) 3.125 MG tablet Take 1 tablet (3.125 mg total) by mouth 2 (two) times daily. 12/19/18 12/19/19  Kirsten Trinh MD   clopidogrel (PLAVIX) 75 mg tablet Take 1 tablet (75 mg total) by mouth once daily. 12/20/18 12/20/19  Kirsten Trinh MD   coenzyme Q10 100 mg capsule Take 100 mg by mouth every morning.    Historical Provider, MD   dextrose 5 % SolP 250 mL with vancomycin 1,000 mg SolR 1,250 mg Inject 1,250 mg into the vein once  "daily. 12/19/18 1/22/19  Kirsten Trinh MD   dorzolamide (TRUSOPT) 2 % ophthalmic solution Place 1 drop into both eyes 3 (three) times daily.    Historical Provider, MD   ezetimibe (ZETIA) 10 mg tablet Take 1 tablet (10 mg total) by mouth once daily. 9/7/18 9/7/19  Sheryl Madison, SCOTT   fish oil-omega-3 fatty acids 300-1,000 mg capsule Take 2 capsules (2 g total) by mouth 2 (two) times daily.  Patient taking differently: Take 1 g by mouth 2 (two) times daily.  10/22/14   Mike Bass MD   furosemide (LASIX) 80 MG tablet Take 1 tablet (80 mg total) by mouth 2 (two) times daily. 12/28/18 12/28/19  Denys Varela MD   insulin aspart U-100 (NOVOLOG) 100 unit/mL InPn pen Inject 3 Units into the skin 3 (three) times daily. 12/19/18 12/19/19  Kirsten Trinh MD   insulin detemir U-100 (LEVEMIR FLEXTOUCH) 100 unit/mL (3 mL) SubQ InPn pen Inject 10 Units into the skin every evening. 12/19/18 12/19/19  Kirsten Trinh MD   metOLazone (ZAROXOLYN) 2.5 MG tablet Take 2.5 mg by mouth once daily.    Historical Provider, MD   miconazole NITRATE 2 % (MICOTIN) 2 % top powder Apply topically 2 (two) times daily. 12/19/18   Kirsten Trinh MD   MULTIVIT,THER IRON,CA,FA & MIN (MULTIVITAMIN) Tab Take 1 tablet by mouth every morning.     Historical Provider, MD   pen needle, diabetic 31 gauge x 1/4" Ndle Use as directed 8/11/16   Mike Bass MD   senna-docusate 8.6-50 mg (PERICOLACE) 8.6-50 mg per tablet Take 1 tablet by mouth 2 (two) times daily. 12/19/18   Kirsten Trinh MD   sodium chloride 0.9% SolP 100 mL with meropenem 1 gram SolR 2 g Inject 2 g into the vein every 8 (eight) hours. 12/19/18 1/30/19  Kirsten Trinh MD   timolol maleate 0.5% (TIMOPTIC-XE) 0.5 % SolG Place 1 drop into both eyes 3 (three) times daily.    Historical Provider, MD     Scheduled Meds:   Continuous Infusions:   PRN Meds:  Anticoagulants/Antiplatelets: aspirin, Plavix and Lovenox    Allergies:   Review of patient's " allergies indicates:   Allergen Reactions    Statins-hmg-coa reductase inhibitors      Generalized Pain    Onglyza [saxagliptin]     Penicillins Rash     Sedation Hx: have not been any systemic reactions    Labs:  No results for input(s): INR in the last 168 hours.    Invalid input(s):  PT,  PTT    Recent Labs   Lab 01/10/19  0510   WBC 7.39   HGB 9.3*   HCT 29.6*   MCV 91         Recent Labs   Lab 01/10/19  0510   *   *   K 3.8   CL 97   CO2 26   BUN 71*   CREATININE 1.2   CALCIUM 8.8   MG 2.0   ALT 11   AST 15   ALBUMIN 2.5*   BILITOT 0.7         Vitals:        Physical Exam:  ASA: 3  Mallampati: n/a    General: no acute distress  Mental Status: alert and oriented to person, place and time  HEENT: normocephalic, atraumatic  Chest: unlabored breathing  Heart: regular heart rate  Abdomen: distended  Extremity: moves all extremities    Plan: ultrasound guided paracentesis  Sedation Plan: local    MISBAH Goldman, JUSTYNP  Interventional Radiology  [phone] [employer]

## 2019-01-10 NOTE — PROGRESS NOTES
Paracentesis complete at this time. Patient tolerated well 25052px removed from right abdomen.  250cc of albumin given IV. Dressing applied, clean dry and intact. Patient verbalized understanding of discharge instructions. Patient in wheelchair, waiting with staff on [facility] reynolds. Report called to nurse at [facility].

## 2019-01-10 NOTE — DISCHARGE INSTRUCTIONS
For scheduling: Call Josette at [phone]    For questions or concerns call: TARYN MON-FRI 8 AM- 5PM [phone].   Radiology resident on call [phone].    For immediate concerns that are not emergent, you may call our radiology clinic at: [phone]

## 2019-01-10 NOTE — PROCEDURES
Radiology Post-Procedure Note    Pre Op Diagnosis: Ascites  Post Op Diagnosis: Same    Procedure: Ultrasound Guided Paracentesis    Procedure performed by: Abdullahi CHAVEZ, Tory     Written Informed Consent Obtained: Yes  Specimen Removed: YES clear yellow  Estimated Blood Loss: Minimal    Findings:   Successful paracentesis.  Albumin administered PRN per protocol.    Patient tolerated procedure well.    Tory Fernando, APRN, FNP  Interventional Radiology  [phone] spectralink

## 2019-01-10 NOTE — PROGRESS NOTES
IR staff assisted patient to wheelchair, patient was leaning to the left side. Staff asked patient if he was ok, patient stated he was sleepy.  PT staff from [facility] assisted IR staff getting patient back to bed.  Patient again stated he was ok and only sleepy. BP checked 131 83, blood glucose was also checked and 146.  Patient currently resting waiting on [facility] transport.  Will continue to monitor and support as needed.  Report called to Catia.

## 2019-01-14 ENCOUNTER — OFFICE VISIT (OUTPATIENT)
Dept: VASCULAR SURGERY | Facility: CLINIC | Age: 59
End: 2019-01-14
Payer: COMMERCIAL

## 2019-01-14 ENCOUNTER — PATIENT MESSAGE (OUTPATIENT)
Dept: VASCULAR SURGERY | Facility: CLINIC | Age: 59
End: 2019-01-14

## 2019-01-14 VITALS
HEIGHT: 70 IN | DIASTOLIC BLOOD PRESSURE: 62 MMHG | HEART RATE: 82 BPM | SYSTOLIC BLOOD PRESSURE: 120 MMHG | WEIGHT: 199 LBS | BODY MASS INDEX: 28.49 KG/M2

## 2019-01-14 DIAGNOSIS — S81.802D LEG WOUND, LEFT, SUBSEQUENT ENCOUNTER: Primary | ICD-10-CM

## 2019-01-14 PROBLEM — M86.272 SUBACUTE OSTEOMYELITIS OF LEFT FOOT: Status: ACTIVE | Noted: 2019-01-14

## 2019-01-14 LAB
BACTERIA SPEC AEROBE CULT: NORMAL

## 2019-01-14 PROCEDURE — 3008F PR BODY MASS INDEX (BMI) DOCUMENTED: ICD-10-PCS | Mod: CPTII,S$GLB,, | Performed by: SURGERY

## 2019-01-14 PROCEDURE — 99214 PR OFFICE/OUTPT VISIT, EST, LEVL IV, 30-39 MIN: ICD-10-PCS | Mod: S$GLB,,, | Performed by: SURGERY

## 2019-01-14 PROCEDURE — 99999 PR PBB SHADOW E&M-EST. PATIENT-LVL IV: CPT | Mod: PBBFAC,,, | Performed by: SURGERY

## 2019-01-14 PROCEDURE — 3078F PR MOST RECENT DIASTOLIC BLOOD PRESSURE < 80 MM HG: ICD-10-PCS | Mod: CPTII,S$GLB,, | Performed by: SURGERY

## 2019-01-14 PROCEDURE — 3008F BODY MASS INDEX DOCD: CPT | Mod: CPTII,S$GLB,, | Performed by: SURGERY

## 2019-01-14 PROCEDURE — 99999 PR PBB SHADOW E&M-EST. PATIENT-LVL IV: ICD-10-PCS | Mod: PBBFAC,,, | Performed by: SURGERY

## 2019-01-14 PROCEDURE — 99214 OFFICE O/P EST MOD 30 MIN: CPT | Mod: S$GLB,,, | Performed by: SURGERY

## 2019-01-14 PROCEDURE — 3074F SYST BP LT 130 MM HG: CPT | Mod: CPTII,S$GLB,, | Performed by: SURGERY

## 2019-01-14 PROCEDURE — 3074F PR MOST RECENT SYSTOLIC BLOOD PRESSURE < 130 MM HG: ICD-10-PCS | Mod: CPTII,S$GLB,, | Performed by: SURGERY

## 2019-01-14 PROCEDURE — 3078F DIAST BP <80 MM HG: CPT | Mod: CPTII,S$GLB,, | Performed by: SURGERY

## 2019-01-14 NOTE — LETTER
January 14, 2019        Rubén Davis MD  [address] [facility] [place] [address]             [facility]  [address] [facility] [address]  [facility] [address]  Phone: [phone]  Fax: [phone]   Patient: Marvin Ray   MR Number: [id number]   YOB: 1960   Date of Visit: 1/14/2019       Dear Dr. Davis:    Thank you for referring Marvin Ray to me for evaluation. Below are the relevant portions of my assessment and plan of care.            If you have questions, please do not hesitate to call me. I look forward to following Marvin along with you.    Sincerely,      Mitch Chan MD           CC  No Recipients

## 2019-01-14 NOTE — PROGRESS NOTES
Mitch Chan MD [facility]                         01/14/2019    HPI:  Marvin Ray is a 58 y.o. male with   Patient Active Problem List   Diagnosis    Epiretinal membrane, both eyes    Nuclear sclerotic cataract of right eye    Pseudophakia, left eye    Ptosis, left eyelid    DM type 2 with diabetic peripheral neuropathy    HLD (hyperlipidemia)    Neovascular glaucoma of both eyes    Tophaceous gout    Essential hypertension    CAD (coronary artery disease)    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 edema, with long-term current use of insulin    Neovascular glaucoma of left eye, severe stage    Statin myopathy    Neovascular glaucoma, right eye, mild stage    Left leg cellulitis    PVD (peripheral vascular disease)    Right wrist pain    Multiple open wounds of lower leg    Hepatosplenomegaly    Acute on chronic combined systolic and diastolic heart failure    Non-pressure chronic ulcer of other part of left foot with fat layer exposed    Type 2 diabetes mellitus with left diabetic foot ulcer    Open wound of left foot    Acute renal failure superimposed on stage 3 chronic kidney disease    Cellulitis of left lower extremity    Diabetic foot infection    Cardiac arrest    Other ascites    Severe malnutrition    Goals of care, counseling/discussion    Alteration in skin integrity    MDR Acinetobacter baumannii infection    Takes dietary supplements    Intertriginous dermatitis associated with moisture    Blisters of multiple sites    Debility    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  Seen in hospital last mo with LLE cellulitis.  Treated with Abx.  Also had paracentesis for ascites with negative w/u per family.  Patient states location is L foot  occurring for 1-2 mo, worsening foot wound although improvement in edema and erythema.  Associated signs and symptoms include pain and edema.  Quality is aching and severity is 5/10.  Symptoms began 10/2018 spontaneously with blistering and severe edema.  Alleviating factors include wound care and elevation.  Worsening factors include pressure.    Tobacco use: denies    1/4/19: s/p LLE angioplasty and multiple subsequent OR and bedside debridements.  On IV Abx per culture results.  Glucose better controlled.  No fevers.  Receiving local wound care with Santyl.    Interval history:  Cont to receive local wound care.  Pseudomonas in tissue and bone cultures multidrug resistant other than aminoglycoside; d/w Dr. eVlez with high risk of ESRD with 6 weeks of aminoglycoside treatment.  No F/C.    Past Medical History:   Diagnosis Date    Arthritis     Cellulitis     CKD (chronic kidney disease), stage III     Coronary artery disease     Diabetes mellitus     Diabetic retinopathy     Diabetic ulcer of left foot     Glaucoma     Gout     Hyperlipemia     Hypertension     ICD (implantable cardioverter-defibrillator) in place 11/02/2018    Left chest    Non-pressure chronic ulcer of other part of left foot with fat layer exposed 10/23/2018    PVD (peripheral vascular disease)     Type 2 diabetes mellitus with left diabetic foot ulcer 10/29/2018    Unsteady gait     uses a wheelchair     Past Surgical History:   Procedure Laterality Date    AHMED GLAUCOMA IMPLANT Left 2011    DONE AT [facility]    AMPUTATION, TOE Left 12/5/2018    [facility] by [facility] at [facility] OR    AMPUTATION, TOES  2-5 Left 11/16/2018    [facility] by [facility] at [facility] OR    BAERVELDT GLAUCOMA IMPLANT Left 2012    WITH CATARACT EXTRACTION//DONE AT [facility]    CARDIAC CATHETERIZATION Left 05/2016    CARDIAC DEFIBRILLATOR PLACEMENT Left 11/02/2018    CATARACT EXTRACTION W/  INTRAOCULAR LENS IMPLANT Left 2012     WITH BAERVEDT//DONE AT [facility]    CATARACT EXTRACTION W/  INTRAOCULAR LENS IMPLANT Right 09/26/2018    COMPLEX ()    CB DESTRUCTION WITH CYCLO G6 Left 02/15/2017        CYST REMOVAL      DEBRIDEMENT, FOOT  12/28/2018    [facility] at [facility] OR    Exam under anesthesia, left foot debridement, left foot washout, possible left second through fifth metatarsal resection Left 12/28/2018    [facility] by Mitch [facility] at [facility] OR    Exam under anesthesia, left foot debridement, washout and all other indicated procedures Left 12/5/2018    [facility] by Mitch [facility] at [facility] OR    EXCISION, LESION, METATARSAL BONE  12/28/2018    [facility] by Mitch [facility] at [facility] OR    HEART CATH-LEFT N/A 5/6/2016    Performed by Mike Magana [facility] at [facility] CATH LAB    INCISION AND DRAINAGE Left 11/16/2018    [facility] by Mitch Chan [facility] at [facility] OR    Incision and Drainage, left lower extremity debridement, washout Left 11/14/2018    [facility] by Mitch [facility] at [facility] OR    INSERTION, ICD GENERATOR, SINGLE CHAMBER N/A 11/2/2018    Performed by Pernell Greer MD at [facility] CATH LAB    INSERTION, IOL PROSTHESIS Right 9/26/2018    Performed by Perla Cortés MD at [facility] OR 1ST FLR    PHACOEMULSIFICATION, CATARACT Right 9/26/2018    Performed by Perla Cortés MD at [facility] OR 1ST FLR    Right foot surgery  10/2014    TOE AMPUTATION Right     first and second    TONSILLECTOMY      TRABECULECTOMY/G 6 LASER Left 2/15/2017    Performed by Perla Cortés MD at [facility] OR 1ST FLR     Family History   Problem Relation Age of Onset    Diabetes Mother     Heart disease Brother     No Known Problems Father     No Known Problems Sister     No Known Problems Maternal Aunt     No Known Problems Maternal Uncle     No Known Problems Paternal Aunt     No Known Problems Paternal Uncle     No Known Problems Maternal Grandmother      No Known Problems Maternal Grandfather     No Known Problems Paternal Grandmother     No Known Problems Paternal Grandfather     Anemia Neg Hx     Arrhythmia Neg Hx     Asthma Neg Hx     Clotting disorder Neg Hx     Fainting Neg Hx     Heart attack Neg Hx     Heart failure Neg Hx     Hyperlipidemia Neg Hx     Hypertension Neg Hx     Stroke Neg Hx     Atrial Septal Defect Neg Hx     Amblyopia Neg Hx     Blindness Neg Hx     Glaucoma Neg Hx     Macular degeneration Neg Hx     Retinal detachment Neg Hx     Strabismus Neg Hx      Social History     Socioeconomic History    Marital status: Legally      Spouse name: Not on file    Number of children: Not on file    Years of education: 14    Highest education level: Not on file   Social Needs    Financial resource strain: Not on file    Food insecurity - worry: Not on file    Food insecurity - inability: Not on file    Transportation needs - medical: Not on file    Transportation needs - non-medical: Not on file   Occupational History    Not on file   Tobacco Use    Smoking status: Former Smoker     Packs/day: 1.00     Years: 3.00     Pack years: 3.00     Types: Cigarettes     Last attempt to quit: 1984     Years since quittin.5    Smokeless tobacco: Never Used   Substance and Sexual Activity    Alcohol use: Yes     Alcohol/week: 0.6 oz     Types: 1 Cans of beer per week     Comment: Occasional    Drug use: No    Sexual activity: Not Currently   Other Topics Concern    Not on file   Social History Narrative    Not on file     No current facility-administered medications for this visit.     Current Outpatient Medications:     furosemide (LASIX) 80 MG tablet, Take 1 tablet (80 mg total) by mouth 2 (two) times daily., Disp: 60 tablet, Rfl: 11    Facility-Administered Medications Ordered in Other Visits:     acetaminophen tablet 650 mg, 650 mg, Oral, Q6H PRN, Nick Beltrán NP    allopurinol tablet 300 mg, 300  mg, Oral, Daily, Nick Beltrán NP, 300 mg at 01/14/19 0803    aspirin chewable tablet 81 mg, 81 mg, Oral, Daily, Nick Beltrán, SCOTT, 81 mg at 01/14/19 0803    brimonidine 0.2% ophthalmic solution 1 drop, 1 drop, Both Eyes, BID, Nick Beltrán NP, 1 drop at 01/14/19 0806    ceftolozane-tazobactam in sodium chloride 0.9% 100 mL, , Intravenous, Q8H, Desiree Howe MD, Last Rate: 100 mL/hr at 01/14/19 1122    clopidogrel tablet 75 mg, 75 mg, Oral, Daily, Nick Beltrán NP, 75 mg at 01/14/19 0803    coenzyme Q10 100 mg, 100 mg, Oral, Daily with breakfast, Sara Ching MD, 100 mg at 01/14/19 0805    collagenase ointment, , Topical (Top), Daily, Nick Beltrán NP    dextrose 50% injection 12.5 g, 12.5 g, Intravenous, PRN, Nick Beltrán, NP    dextrose 50% injection 25 g, 25 g, Intravenous, PRN, Nick Beltrán NP    dorzolamide 2 % ophthalmic solution 1 drop, 1 drop, Both Eyes, TID, Sara Ching MD, 1 drop at 01/14/19 0806    enoxaparin injection 40 mg, 40 mg, Subcutaneous, Daily, Denys Varela MD, 40 mg at 01/13/19 1604    ezetimibe tablet 10 mg, 10 mg, Oral, Daily, Nick Beltrán NP, 10 mg at 01/14/19 0802    furosemide injection 40 mg, 40 mg, Intravenous, BID, Sara Ching MD, 40 mg at 01/14/19 0803    glucagon (human recombinant) injection 1 mg, 1 mg, Intramuscular, PRN, Nick Beltrán NP    glucose chewable tablet 16 g, 16 g, Oral, PRN, Quianne S. Aguillard, NP    glucose chewable tablet 24 g, 24 g, Oral, PRN, Nick Beltrán NP    insulin aspart U-100 pen 1-10 Units, 1-10 Units, Subcutaneous, QID (AC + HS) PRN, Nick Beltrán NP, 1 Units at 01/13/19 2101    insulin aspart U-100 pen 3 Units, 3 Units, Subcutaneous, TIDWM, Nick Beltrán NP, 3 Units at 01/14/19 1121    insulin detemir U-100 pen 10 Units, 10 Units, Subcutaneous, QHS, Nick Beltrán NP, 10 Units at 01/13/19 2100    lactated ringers  infusion, , Intravenous, Continuous, Tam Reynolds MD    lidocaine (PF) 10 mg/ml (1%) injection 10 mg, 1 mL, Intradermal, Once, Tam Reynolds MD    metOLazone tablet 5 mg, 5 mg, Oral, Q24H, Sara Ching MD, 5 mg at 01/13/19 1833    miconazole NITRATE 2 % top powder, , Topical (Top), BID, Nick Beltrán NP    multivitamin per tablet 1 tablet, 1 tablet, Oral, Daily, Sara Ching MD, 1 tablet at 01/14/19 0802    omega 3-dha-epa-fish oil 1,000 mg (120 mg-180 mg) Cap 1 capsule, 1 capsule, Oral, BID, Sara Ching MD, 1 capsule at 01/14/19 0804    ondansetron injection 4 mg, 4 mg, Intravenous, Q6H PRN, Nick Beltrán NP    oxyCODONE-acetaminophen 5-325 mg per tablet 1 tablet, 1 tablet, Oral, Q6H PRN, Nick Beltrán NP, 1 tablet at 01/14/19 1118    prednisoLONE acetate 1 % ophthalmic suspension 1 drop, 1 drop, Right Eye, Daily, Nick Beltrán NP, 1 drop at 01/14/19 0806    ramelteon tablet 8 mg, 8 mg, Oral, Nightly PRN, Nick Beltrán NP, 8 mg at 01/07/19 2110    senna-docusate 8.6-50 mg per tablet 1 tablet, 1 tablet, Oral, BID PRN, Sara Ching MD, 1 tablet at 12/31/18 2225    Flushing PICC Protocol, , , Until Discontinued **AND** sodium chloride 0.9% flush 10 mL, 10 mL, Intravenous, Q6H, 10 mL at 01/14/19 0642 **AND** sodium chloride 0.9% flush 10 mL, 10 mL, Intravenous, PRN, Nick Beltrán NP    spironolactone tablet 25 mg, 25 mg, Oral, Daily, Sara Ching MD, 25 mg at 01/14/19 0803    timolol maleate 0.5% ophthalmic solution 1 drop, 1 drop, Both Eyes, TID, Sara Ching MD, 1 drop at 01/14/19 0806    REVIEW OF SYSTEMS:  General: No fevers or chills; ENT: No sore throat; Allergy and Immunology: no persistent infections; Hematological and Lymphatic: No history of bleeding or easy bruising; Endocrine: negative; Respiratory: no cough, shortness of breath, or wheezing; Cardiovascular: no chest pain or dyspnea on exertion; Gastrointestinal: no  abdominal pain/back, change in bowel habits, or bloody stools; Genito-Urinary: no dysuria, trouble voiding, or hematuria; Musculoskeletal: negative; Neurological: no TIA or stroke symptoms; Psychiatric: no nervousness, anxiety or depression.    PHYSICAL EXAM:      Pulse: 82         General appearance:  Alert, well-appearing, and in no distress.  Oriented to person, place, and time                    Neurological: Normal speech, no focal findings noted; CN II - XII grossly intact. RLE with sensation to light touch, LLE with sensation to light touch.            Musculoskeletal: Digits/nail without cyanosis/clubbing.  Strength 5/5 BLE.                    Neck: Supple, no significant adenopathy                  Chest:  Clear to auscultation, no wheezes, rales or rhonchi, symmetric air entry. No use of accessory muscles               Cardiac: Normal rate and regular rhythm, S1 and S2 normal            Abdomen: Soft, nontender, nondistended, no masses or organomegaly, no hernia     No rebound tenderness noted; bowel sounds normal     No groin adenopathy      Extremities:   2+ R femoral pulse, 2+ L femoral pulse     doppler+ R popliteal pulse, doppler+ L popliteal pulse     doppler+ R PT pulse, doppler+ L PT pulse     doppler+ R DP pulse, doppler+ L DP pulse     1+ RLE edema, 2+ LLE edema    Skin: RLE with ulcer to anterior shin; LLE superficial anterior lower leg wounds without infection, +large foot wound with fibrinous and granulation tissue, no odor, no purulence or erythema    LAB RESULTS:  No results found for: CBC  Lab Results   Component Value Date    LABPROT 11.2 12/27/2018    INR 1.1 12/27/2018     Lab Results   Component Value Date     01/14/2019    K 3.6 01/14/2019     01/14/2019    CO2 27 01/14/2019    GLU 80 01/14/2019    BUN 40 (H) 01/14/2019    CREATININE 0.8 01/14/2019    CALCIUM 8.4 (L) 01/14/2019    ANIONGAP 7 (L) 01/14/2019    EGFRNONAA >60.0 01/14/2019     Lab Results   Component Value Date     WBC 5.36 01/14/2019    RBC 2.88 (L) 01/14/2019    HGB 8.3 (L) 01/14/2019    HCT 26.8 (L) 01/14/2019    MCV 93 01/14/2019    MCH 28.8 01/14/2019    MCHC 31.0 (L) 01/14/2019    RDW 16.1 (H) 01/14/2019    PLT 71 (L) 01/14/2019    MPV 10.5 01/14/2019    GRAN 4.0 01/14/2019    GRAN 73.7 (H) 01/14/2019    LYMPH 0.7 (L) 01/14/2019    LYMPH 13.2 (L) 01/14/2019    MONO 0.3 01/14/2019    MONO 6.0 01/14/2019    EOS 0.3 01/14/2019    BASO 0.07 01/14/2019    EOSINOPHIL 5.4 01/14/2019    BASOPHIL 1.3 01/14/2019    DIFFMETHOD Automated 01/14/2019     .  Lab Results   Component Value Date    HGBA1C 8.3 (H) 10/30/2018       IMAGING:  All pertinent imaging has been reviewed and interpreted independently.    BLE arterial US 1/2019: No focal stenosis    IMP/PLAN:  58 y.o. male with   Patient Active Problem List   Diagnosis    Epiretinal membrane, both eyes    Nuclear sclerotic cataract of right eye    Pseudophakia, left eye    Ptosis, left eyelid    DM type 2 with diabetic peripheral neuropathy    HLD (hyperlipidemia)    Neovascular glaucoma of both eyes    Tophaceous gout    Essential hypertension    CAD (coronary artery disease)    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 edema, with long-term current use of insulin    Neovascular glaucoma of left eye, severe stage    Statin myopathy    Neovascular glaucoma, right eye, mild stage    Left leg cellulitis    PVD (peripheral vascular disease)    Right wrist pain    Multiple open wounds of lower leg    Hepatosplenomegaly    Acute on chronic combined systolic and diastolic heart failure    Non-pressure chronic ulcer of other part of left foot with fat layer exposed    Type 2 diabetes mellitus with left diabetic foot ulcer    Open wound of left foot    Acute renal failure superimposed on stage 3 chronic kidney disease    Cellulitis of left lower extremity    Diabetic foot infection    Cardiac arrest     Other ascites    Severe malnutrition    Goals of care, counseling/discussion    Alteration in skin integrity    MDR Acinetobacter baumannii infection    Takes dietary supplements    Intertriginous dermatitis associated with moisture    Blisters of multiple sites    Debility    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    -L foot wound slowly healing, multifactorial due to diabetes, PVD and venous insufficiency with improvement in granulation tissue although bacteria resistant to all safe treatment options - d/w pt and family re: risk of bacteremia and progression of infection with untreated Pseudomonas in wound/bone cultures despite improvement in perfusion and wound bed; amputation options are limited due to venous stasis changes to below left knee leg and ulcerations which would require an above knee amputation at this time  -Spoke with Dr. Velez without safe alternative treatment options  -Recommend BLE Xeroform and dry kerlix wraps twice daily with elevation of LLE above level of the heart  -Low sodium diet  -Strict glycemic control  -Cont Abx per ID recs  -RTC 1-2 weeks for evaluation of distal LLE for BKA stump options vs AKA    I spent 30 minutes evaluating this patient and greater than 50% of the time was spent counseling, coordinator care and discussing the plan of care.  All questions were answered and patient stated understanding with agreement with the above treatment plan.    Mitch Chan MD [facility]  Vascular and Endovascular Surgery

## 2019-01-16 ENCOUNTER — TELEPHONE (OUTPATIENT)
Dept: VASCULAR SURGERY | Facility: CLINIC | Age: 59
End: 2019-01-16

## 2019-01-16 NOTE — TELEPHONE ENCOUNTER
Left voicemail about 2 wk f/u appointment with our office. Explained that believed that the facility could see the appointment because they were on epic. Asked sister to call the office if the appointment needed to be faxed. Left callback number.

## 2019-01-24 ENCOUNTER — TELEPHONE (OUTPATIENT)
Dept: FAMILY MEDICINE | Facility: CLINIC | Age: 59
End: 2019-01-24

## 2019-01-24 PROBLEM — I50.43 ACUTE ON CHRONIC COMBINED SYSTOLIC AND DIASTOLIC HEART FAILURE: Status: RESOLVED | Noted: 2018-10-16 | Resolved: 2019-01-24

## 2019-01-24 PROBLEM — R23.8 BLISTERS OF MULTIPLE SITES: Status: RESOLVED | Noted: 2018-12-21 | Resolved: 2019-01-24

## 2019-01-24 PROBLEM — I46.9 CARDIAC ARREST: Status: RESOLVED | Noted: 2018-12-11 | Resolved: 2019-01-24

## 2019-01-24 NOTE — TELEPHONE ENCOUNTER
----- Message from Sophia Jansen sent at 1/24/2019  2:51 PM CST -----  Contact: [facility]  Is calling to speak with staff about referral. Wants to know Dr Davis will continue to follow pt. Please call [phone]

## 2019-01-25 ENCOUNTER — TELEPHONE (OUTPATIENT)
Dept: FAMILY MEDICINE | Facility: CLINIC | Age: 59
End: 2019-01-25

## 2019-01-25 ENCOUNTER — HOSPITAL ENCOUNTER (EMERGENCY)
Facility: HOSPITAL | Age: 59
Discharge: HOME | End: 2019-01-25
Payer: COMMERCIAL

## 2019-01-25 ENCOUNTER — PROCEDURE VISIT (OUTPATIENT)
Dept: OPHTHALMOLOGY | Facility: CLINIC | Age: 59
End: 2019-01-25
Payer: COMMERCIAL

## 2019-01-25 VITALS
TEMPERATURE: 98 F | DIASTOLIC BLOOD PRESSURE: 59 MMHG | SYSTOLIC BLOOD PRESSURE: 111 MMHG | RESPIRATION RATE: 18 BRPM | BODY MASS INDEX: 28.2 KG/M2 | HEART RATE: 92 BPM | OXYGEN SATURATION: 96 % | HEIGHT: 70 IN | WEIGHT: 197 LBS

## 2019-01-25 DIAGNOSIS — Z45.2 PICC (PERIPHERALLY INSERTED CENTRAL CATHETER) FLUSH: Primary | ICD-10-CM

## 2019-01-25 DIAGNOSIS — H40.53X2 NEOVASCULAR GLAUCOMA OF BOTH EYES, MODERATE STAGE: ICD-10-CM

## 2019-01-25 PROCEDURE — 67028 PR INJECT INTRAVITREAL PHARMCOLOGIC: ICD-10-PCS | Mod: RT,S$GLB,, | Performed by: OPHTHALMOLOGY

## 2019-01-25 PROCEDURE — 67028 INJECTION EYE DRUG: CPT | Mod: RT,S$GLB,, | Performed by: OPHTHALMOLOGY

## 2019-01-25 PROCEDURE — 96374 THER/PROPH/DIAG INJ IV PUSH: CPT

## 2019-01-25 PROCEDURE — 99284 EMERGENCY DEPT VISIT MOD MDM: CPT | Mod: 25

## 2019-01-25 PROCEDURE — 92014 COMPRE OPH EXAM EST PT 1/>: CPT | Mod: 25,S$GLB,, | Performed by: OPHTHALMOLOGY

## 2019-01-25 PROCEDURE — 92134 POSTERIOR SEGMENT OCT RETINA (OCULAR COHERENCE TOMOGRAPHY)-BOTH EYES: ICD-10-PCS | Mod: S$GLB,,, | Performed by: OPHTHALMOLOGY

## 2019-01-25 PROCEDURE — 99282 PR EMERGENCY DEPT VISIT,LEVEL II: ICD-10-PCS | Mod: ,,, | Performed by: EMERGENCY MEDICINE

## 2019-01-25 PROCEDURE — 63600175 PHARM REV CODE 636 W HCPCS: Mod: JG | Performed by: GENERAL PRACTICE

## 2019-01-25 PROCEDURE — 99282 EMERGENCY DEPT VISIT SF MDM: CPT | Mod: ,,, | Performed by: EMERGENCY MEDICINE

## 2019-01-25 PROCEDURE — 92134 CPTRZ OPH DX IMG PST SGM RTA: CPT | Mod: S$GLB,,, | Performed by: OPHTHALMOLOGY

## 2019-01-25 PROCEDURE — 92014 PR EYE EXAM, EST PATIENT,COMPREHESV: ICD-10-PCS | Mod: 25,S$GLB,, | Performed by: OPHTHALMOLOGY

## 2019-01-25 RX ADMIN — ALTEPLASE 2 MG: 2.2 INJECTION, POWDER, LYOPHILIZED, FOR SOLUTION INTRAVENOUS at 09:01

## 2019-01-25 RX ADMIN — Medication 1.25 MG: at 12:01

## 2019-01-25 NOTE — PROGRESS NOTES
HPI     Diabetic Eye Exam      Additional comments: 2 mon chk              Comments       Cosopt TID OU   Alphagan BID OU   PF QD OD      HPI     DLS 10/9/18- Vision blurry OU    Cosopt TID OU   Alphagan TID OU   PF TID OD    Prior OCT : OD shows central DME, stable   OS ERM with DME central cyst, stable    Prior FA: shows macular leakage OU, significant NP OU, no NV OU, enlarged SURINDER OU    A/P    1. PDR s/p PRP OU (DM2), with CSME OU (and HTN)  - Encouraged good BS/BP/Cholesterol control  T2 uncontrolled on insulin  S/p PRP fill in 11/18    2. DME OU, OD>OS  - S/p Avastin OD x 16, OS x 7  - Will monitor OS for now given NVG and ERM  - Could consider Ozurdex but pt is POAG and phakic, IOP good right now)  With some NVI/NVA post CE OD  Will inject avastin q 3 month for now    - Avastin OD today      2. NVG OS   FH:   CCT:449 // 540    Gonio: OD CBB; OS sup CBB/nasal small NV at 9:00/inf PAS/temp CBB with PAS   Surgeries:;  phaco/BV 2012   Lasers: SLT   Tmax:    Tbase (before treatment):   Ttarget:     Drop intolerance:    APD:     - Stopped Xalatan OD due to macular edema  - pt reports good adherence  - No NVA OD. 1 NVA vessel at 9 oclock OS  - Cont drops as above    Sees KL    3. PCIOL OD  Some NVA noted after CE      4. PSK OS  - Stable, CTM, RD precautions       5. ERM OS>OD  - Given NVG hx, monitor  - CTM      3 months OCT    Risks, benefits, and alternatives to treatment discussed in detail with the patient.  The patient voiced understanding and wished to proceed with the procedure    Injection Procedure Note:  Diagnosis: PDR/NVG and DME OD    Patient Identified and Time Out complete  Topical Proparacaine and Betadine.  Inject Avastin OD at 6:00 @ 3.5-4mm posterior to limbus  Post Operative Dx: Same  Complications: None  Follow up as above.

## 2019-01-25 NOTE — TELEPHONE ENCOUNTER
----- Message from Delmis Chung sent at 1/25/2019  3:58 PM CST -----  Contact: sister rhina [phone]  Name of Who is Calling: pt sister      What is the request in detail: pt's picc line is clogged. Call sister       Can the clinic reply by [facility]: no      [facility] sister rhina [phone]

## 2019-01-25 NOTE — TELEPHONE ENCOUNTER
Contacted pts sister and informed her that pt must go to ER asap . Even if one pic line is clogged , pt still needs to go to ER .

## 2019-01-25 NOTE — PATIENT INSTRUCTIONS

## 2019-01-26 NOTE — ED PROVIDER NOTES
"Encounter Date: 1/25/2019       History     Chief Complaint   Patient presents with    Vascular Access Problem     "Can't flush red line, purple line open"- line placed yesterday. Needs to get antibiotics for left foot infection.      HPI   Patient presents with PICC line malfunction. Complicated PMH of cardiac arrest and has infected diabetic ulcer for which he receives IV abx. Line was changed yesterday and both ports flushed at that time. Today, one port will flush but the other will not. No pain or swelling, no other complaints. Has not missed any antibiotics yet.     Review of patient's allergies indicates:   Allergen Reactions    Statins-hmg-coa reductase inhibitors      Generalized Pain    Onglyza [saxagliptin]     Penicillins Rash     Past Medical History:   Diagnosis Date    Arthritis     Cellulitis     CKD (chronic kidney disease), stage III     Coronary artery disease     Diabetes mellitus     Diabetic retinopathy     Diabetic ulcer of left foot     Glaucoma     Gout     Hyperlipemia     Hypertension     ICD (implantable cardioverter-defibrillator) in place 11/02/2018    Left chest    Non-pressure chronic ulcer of other part of left foot with fat layer exposed 10/23/2018    PVD (peripheral vascular disease)     Type 2 diabetes mellitus with left diabetic foot ulcer 10/29/2018    Unsteady gait     uses a wheelchair     Past Surgical History:   Procedure Laterality Date    AHMED GLAUCOMA IMPLANT Left 2011    DONE AT [facility]    AMPUTATION, TOE Left 12/5/2018    [facility]    AMPUTATION, TOES  2-5 Left 11/16/2018    [facility]    BAERVELDT GLAUCOMA IMPLANT Left 2012    WITH CATARACT EXTRACTION//DONE AT [facility]    CARDIAC CATHETERIZATION Left 05/2016    CARDIAC DEFIBRILLATOR PLACEMENT Left 11/02/2018    CATARACT EXTRACTION W/  INTRAOCULAR LENS IMPLANT Left 2012    WITH BAERVEDT//DONE AT [facility]    CATARACT " EXTRACTION W/  INTRAOCULAR LENS IMPLANT Right 09/26/2018    COMPLEX ()    CB DESTRUCTION WITH CYCLO G6 Left 02/15/2017        CYST REMOVAL      DEBRIDEMENT, FOOT  12/28/2018    [facility]formed [facility] at [facility] OR    Exam under anesthesia, left foot debridement, left foot washout, possible left second through fifth metatarsal resection Left 12/28/2018    [facility]formed by [facility] at [facility] OR    Exam under anesthesia, left foot debridement, washout and all other indicated procedures Left 12/5/2018    [facility] at [facility] OR    EXCISION, LESION, METATARSAL BONE  12/28/2018    [facility] by [facility] at [facility] OR    HEART CATH-LEFT N/A 5/6/2016    Performed by Mike Magana [facility] at [facility] CATH LAB    INCISION AND DRAINAGE Left 11/16/2018    [facility]formed by [facility] at [facility] OR    Incision and Drainage, left lower extremity debridement, washout Left 11/14/2018    [facility]formed by [facility] at [facility] OR    INSERTION, ICD GENERATOR, SINGLE CHAMBER N/A 11/2/2018    Performed by Pernell Greer MD at [facility] CATH LAB    INSERTION, IOL PROSTHESIS Right 9/26/2018    Performed by Perla Cortés MD at [facility] OR [facility]ST [facility]    PHACOEMULSIFICATION, CATARACT Right 9/26/2018    Performed by Perla [facility] at [facility] OR [facility]    Right foot surgery  10/2014    TOE AMPUTATION Right     first and second    TONSILLECTOMY      TRABECULECTOMY/G 6 LASER Left 2/15/2017    Performed by [facility] at [facility] OR [facility]     Family History   Problem Relation Age of Onset    Diabetes Mother     Heart disease Brother     No Known Problems Father     No Known Problems Sister     No Known Problems Maternal Aunt     No Known Problems Maternal Uncle     No Known Problems Paternal Aunt     No Known Problems Paternal Uncle     No Known Problems Maternal Grandmother     No Known Problems Maternal Grandfather      No Known Problems Paternal Grandmother     No Known Problems Paternal Grandfather     Anemia Neg Hx     Arrhythmia Neg Hx     Asthma Neg Hx     Clotting disorder Neg Hx     Fainting Neg Hx     Heart attack Neg Hx     Heart failure Neg Hx     Hyperlipidemia Neg Hx     Hypertension Neg Hx     Stroke Neg Hx     Atrial Septal Defect Neg Hx     Amblyopia Neg Hx     Blindness Neg Hx     Glaucoma Neg Hx     Macular degeneration Neg Hx     Retinal detachment Neg Hx     Strabismus Neg Hx      Social History     Tobacco Use    Smoking status: Former Smoker     Packs/day: 1.00     Years: 3.00     Pack years: 3.00     Types: Cigarettes     Last attempt to quit: 1984     Years since quittin.5    Smokeless tobacco: Never Used   Substance Use Topics    Alcohol use: Yes     Alcohol/week: 0.6 oz     Types: 1 Cans of beer per week     Comment: Occasional    Drug use: No     Review of Systems   Constitutional: Negative for fever.   HENT: Negative for sore throat.    Respiratory: Negative for shortness of breath.    Cardiovascular: Negative for chest pain.   Gastrointestinal: Negative for nausea.   Genitourinary: Negative for dysuria.   Musculoskeletal: Negative for back pain.   Skin: Negative for rash.   Neurological: Negative for weakness.   Hematological: Does not bruise/bleed easily.       Physical Exam     Initial Vitals [19 1845]   BP Pulse Resp Temp SpO2   (!) 111/59 92 18 97.6 °F (36.4 °C) 96 %      MAP       --         Physical Exam    Nursing note and vitals reviewed.  Constitutional: He appears well-developed and well-nourished. No distress.   Chronically ill appearing   HENT:   Head: Normocephalic and atraumatic.   Eyes: Conjunctivae are normal. Right eye exhibits no discharge. Left eye exhibits no discharge.   Neck: Normal range of motion. No tracheal deviation present.   Cardiovascular: Normal rate and regular rhythm.   PICC in place to right arm, dressing CDI, no erythema or edema of  skin, no tenderness   Pulmonary/Chest: No respiratory distress. He has rhonchi. He has rales.   Abdominal: Soft. He exhibits no distension.   Musculoskeletal: Normal range of motion.   Neurological: He is alert and oriented to person, place, and time.   Skin: Skin is warm and dry. Capillary refill takes less than 2 seconds.   Psychiatric: He has a normal mood and affect.         ED Course   Procedures  Labs Reviewed - No data to display       Imaging Results    None                APC / Resident Notes:   58M with complicated PMH including cardiac arrest and infected diabetic ulcer presents with clogged PICC line. On exam, no distress, PICC line insertion site appears healthy. Cathflo ordered and after that, both port draw back and flush smoothly in PICC line. Stable for discharge. Patient has not missed any antibiotics.     Shelley Medina MD  PGY-4, Emergency Medicine  10:13 PM 1/25/2019           Attending Attestation:   Physician Attestation Statement for Resident:  As the supervising MD  I agree with the above history. -:   As the supervising MD I agree with the above PE.    As the supervising MD I agree with the above treatment, course, plan, and disposition.  I have reviewed the following: old records at this facility.                       Clinical Impression:   The encounter diagnosis was PICC (peripherally inserted central catheter) flush.      Disposition:   Disposition: Discharged  Condition: Stable                        Shelley Medina MD  Resident  01/25/19 7040       Gareth Cook MD  01/28/19 4527

## 2019-01-28 LAB
FUNGUS SPEC CULT: NORMAL
FUNGUS SPEC CULT: NORMAL

## 2019-01-29 ENCOUNTER — OFFICE VISIT (OUTPATIENT)
Dept: INFECTIOUS DISEASES | Facility: CLINIC | Age: 59
End: 2019-01-29
Payer: COMMERCIAL

## 2019-01-29 VITALS
HEIGHT: 70 IN | HEART RATE: 81 BPM | SYSTOLIC BLOOD PRESSURE: 94 MMHG | BODY MASS INDEX: 28.27 KG/M2 | TEMPERATURE: 99 F | DIASTOLIC BLOOD PRESSURE: 63 MMHG

## 2019-01-29 DIAGNOSIS — M86.072 ACUTE HEMATOGENOUS OSTEOMYELITIS OF LEFT FOOT: Primary | ICD-10-CM

## 2019-01-29 PROCEDURE — 99214 OFFICE O/P EST MOD 30 MIN: CPT | Mod: S$GLB,,, | Performed by: INTERNAL MEDICINE

## 2019-01-29 PROCEDURE — 3008F BODY MASS INDEX DOCD: CPT | Mod: CPTII,S$GLB,, | Performed by: INTERNAL MEDICINE

## 2019-01-29 PROCEDURE — 3078F DIAST BP <80 MM HG: CPT | Mod: CPTII,S$GLB,, | Performed by: INTERNAL MEDICINE

## 2019-01-29 PROCEDURE — 3078F PR MOST RECENT DIASTOLIC BLOOD PRESSURE < 80 MM HG: ICD-10-PCS | Mod: CPTII,S$GLB,, | Performed by: INTERNAL MEDICINE

## 2019-01-29 PROCEDURE — 3074F SYST BP LT 130 MM HG: CPT | Mod: CPTII,S$GLB,, | Performed by: INTERNAL MEDICINE

## 2019-01-29 PROCEDURE — 99214 PR OFFICE/OUTPT VISIT, EST, LEVL IV, 30-39 MIN: ICD-10-PCS | Mod: S$GLB,,, | Performed by: INTERNAL MEDICINE

## 2019-01-29 PROCEDURE — 3074F PR MOST RECENT SYSTOLIC BLOOD PRESSURE < 130 MM HG: ICD-10-PCS | Mod: CPTII,S$GLB,, | Performed by: INTERNAL MEDICINE

## 2019-01-29 PROCEDURE — 3008F PR BODY MASS INDEX (BMI) DOCUMENTED: ICD-10-PCS | Mod: CPTII,S$GLB,, | Performed by: INTERNAL MEDICINE

## 2019-01-29 PROCEDURE — 99999 PR PBB SHADOW E&M-EST. PATIENT-LVL III: ICD-10-PCS | Mod: PBBFAC,,, | Performed by: INTERNAL MEDICINE

## 2019-01-29 PROCEDURE — 99999 PR PBB SHADOW E&M-EST. PATIENT-LVL III: CPT | Mod: PBBFAC,,, | Performed by: INTERNAL MEDICINE

## 2019-01-29 RX ORDER — OMEGA-3S/DHA/EPA/FISH OIL/D3 300MG-1000
CAPSULE ORAL
Refills: 3 | COMMUNITY
Start: 2019-01-23 | End: 2019-01-31 | Stop reason: SDUPTHER

## 2019-01-29 RX ORDER — SULFAMETHOXAZOLE AND TRIMETHOPRIM 800; 160 MG/1; MG/1
1 TABLET ORAL 2 TIMES DAILY
Qty: 28 TABLET | Refills: 0 | Status: SHIPPED | OUTPATIENT
Start: 2019-01-29 | End: 2019-02-12

## 2019-01-29 NOTE — PROGRESS NOTES
Subjective:      Patient ID: Marvin Ray is a 58 y.o. male.    Chief Complaint:Follow-up      History of Present Illness    61 y/o male with a history of DM type 2, CAD with CHF s/p AICD placement recently admitted to the hospital for a polymicrobial infection of his left foot.  He is being followed by Dr. Chan with vascular surgery.  He is s/p debridement of his foot on 11/16/18.  He underwent subsequent debridements on 12/5/18 (with amputation of left great toe) and on December 28.  Cultures from his foot wounds have been positive for VRE, stenotrophomonas sp, and MDR Pseudomonas sp.  He was only on daptomycin due to concerns for renal toxicity with use of bactrim for the stenotrophomonas and aminoglycoside for the pseudomonas.      He was discharged to an LTAC initially.  He has now been discharged from the LTAC to his sister's home. [facility].  Foot dressings are changed everyday.      Review of Systems   All other systems reviewed and are negative.    Objective:   Physical Exam   Constitutional: He is oriented to person, place, and time. He appears well-developed and well-nourished. No distress.   HENT:   Head: Normocephalic and atraumatic.   Right Ear: External ear normal.   Left Ear: External ear normal.   Nose: Nose normal.   Mouth/Throat: Oropharynx is clear and moist. No oropharyngeal exudate.   Eyes: Conjunctivae and EOM are normal. Pupils are equal, round, and reactive to light. Right eye exhibits no discharge. Left eye exhibits no discharge. No scleral icterus.   Neck: Normal range of motion. Neck supple. No JVD present. No tracheal deviation present. No thyromegaly present.   Cardiovascular: Normal rate, regular rhythm and intact distal pulses. Exam reveals no gallop and no friction rub.   No murmur heard.  Pulmonary/Chest: Effort normal and breath sounds normal. No stridor. No respiratory distress. He has no wheezes. He has no rales.  He exhibits no tenderness.   Abdominal: Soft. Bowel sounds are normal. He exhibits no distension and no mass. There is no tenderness. There is no rebound and no guarding.   Musculoskeletal: Normal range of motion. He exhibits no edema or tenderness.   Left foot wrapped in dressings.   Lymphadenopathy:     He has no cervical adenopathy.   Neurological: He is alert and oriented to person, place, and time. He has normal reflexes. He displays normal reflexes. No cranial nerve deficit. He exhibits normal muscle tone. Coordination normal.   Skin: Skin is warm. No rash noted. He is not diaphoretic. No erythema. No pallor.   Psychiatric: He has a normal mood and affect. His behavior is normal. Judgment and thought content normal.   Nursing note and vitals reviewed.        Assessment:       1. Acute hematogenous osteomyelitis of left foot        59 y/o with polymicrobial infection of his left foot with highly drug resistant organisms.  Last debridement revealed cultures positive for VRE, Pseudomonas and Stenotrophomonas.  He was only on daptomycin IV due to concerns that treating the drug resistant pseudomonas and the stenotrophomonas would be harmful for his renal function.  I will start him on bactrim again for the stenotrophomonas.  The pseudomonas isolate is only sensitive for aminoglycosides.  Will hold off on this for now given concerns for his kidney function.  He is aware that we may not be able to save his foot with the antibiotics alone.      Plan:       Acute hematogenous osteomyelitis of left foot  -     sulfamethoxazole-trimethoprim 800-160mg (BACTRIM DS) 800-160 mg Tab; Take 1 tablet by mouth 2 (two) times daily. for 14 days  Dispense: 28 tablet; Refill: 0

## 2019-01-30 ENCOUNTER — OFFICE VISIT (OUTPATIENT)
Dept: FAMILY MEDICINE | Facility: CLINIC | Age: 59
End: 2019-01-30
Payer: COMMERCIAL

## 2019-01-30 ENCOUNTER — LAB VISIT (OUTPATIENT)
Dept: LAB | Facility: HOSPITAL | Age: 59
End: 2019-01-30
Attending: FAMILY MEDICINE
Payer: COMMERCIAL

## 2019-01-30 VITALS
HEIGHT: 70 IN | OXYGEN SATURATION: 95 % | TEMPERATURE: 98 F | SYSTOLIC BLOOD PRESSURE: 108 MMHG | BODY MASS INDEX: 28.27 KG/M2 | HEART RATE: 89 BPM | DIASTOLIC BLOOD PRESSURE: 66 MMHG | RESPIRATION RATE: 14 BRPM

## 2019-01-30 DIAGNOSIS — E11.42 DM TYPE 2 WITH DIABETIC PERIPHERAL NEUROPATHY: Primary | Chronic | ICD-10-CM

## 2019-01-30 DIAGNOSIS — Z16.24 MDR ACINETOBACTER BAUMANNII INFECTION: ICD-10-CM

## 2019-01-30 DIAGNOSIS — R53.81 DEBILITY: ICD-10-CM

## 2019-01-30 DIAGNOSIS — I25.10 CORONARY ARTERY DISEASE INVOLVING NATIVE CORONARY ARTERY OF NATIVE HEART WITHOUT ANGINA PECTORIS: Chronic | ICD-10-CM

## 2019-01-30 DIAGNOSIS — R18.8 OTHER ASCITES: ICD-10-CM

## 2019-01-30 DIAGNOSIS — L97.522 NON-PRESSURE CHRONIC ULCER OF OTHER PART OF LEFT FOOT WITH FAT LAYER EXPOSED: ICD-10-CM

## 2019-01-30 DIAGNOSIS — L08.9 DIABETIC FOOT INFECTION: ICD-10-CM

## 2019-01-30 DIAGNOSIS — M86.271 SUBACUTE OSTEOMYELITIS, RIGHT ANKLE AND FOOT: Primary | ICD-10-CM

## 2019-01-30 DIAGNOSIS — E11.628 DIABETIC FOOT INFECTION: ICD-10-CM

## 2019-01-30 DIAGNOSIS — L97.529 TYPE 2 DIABETES MELLITUS WITH LEFT DIABETIC FOOT ULCER: ICD-10-CM

## 2019-01-30 DIAGNOSIS — A49.8 MDR ACINETOBACTER BAUMANNII INFECTION: ICD-10-CM

## 2019-01-30 DIAGNOSIS — E11.621 TYPE 2 DIABETES MELLITUS WITH LEFT DIABETIC FOOT ULCER: ICD-10-CM

## 2019-01-30 DIAGNOSIS — I10 ESSENTIAL HYPERTENSION: Chronic | ICD-10-CM

## 2019-01-30 DIAGNOSIS — M86.272 SUBACUTE OSTEOMYELITIS OF LEFT FOOT: ICD-10-CM

## 2019-01-30 PROBLEM — N17.9 ACUTE RENAL FAILURE SUPERIMPOSED ON STAGE 3 CHRONIC KIDNEY DISEASE: Status: RESOLVED | Noted: 2018-11-08 | Resolved: 2019-01-30

## 2019-01-30 PROBLEM — N18.30 ACUTE RENAL FAILURE SUPERIMPOSED ON STAGE 3 CHRONIC KIDNEY DISEASE: Status: RESOLVED | Noted: 2018-11-08 | Resolved: 2019-01-30

## 2019-01-30 LAB
ALBUMIN SERPL BCP-MCNC: 2.4 G/DL
ALP SERPL-CCNC: 126 U/L
ALT SERPL W/O P-5'-P-CCNC: 64 U/L
ANION GAP SERPL CALC-SCNC: 9 MMOL/L
ANISOCYTOSIS BLD QL SMEAR: ABNORMAL
AST SERPL-CCNC: 78 U/L
BASOPHILS # BLD AUTO: 0.04 K/UL
BASOPHILS NFR BLD: 0.5 %
BILIRUB SERPL-MCNC: 0.4 MG/DL
BUN SERPL-MCNC: 57 MG/DL
CALCIUM SERPL-MCNC: 8.4 MG/DL
CHLORIDE SERPL-SCNC: 99 MMOL/L
CK SERPL-CCNC: 598 U/L
CO2 SERPL-SCNC: 27 MMOL/L
CREAT SERPL-MCNC: 1.2 MG/DL
CRP SERPL-MCNC: 84.6 MG/L
DIFFERENTIAL METHOD: ABNORMAL
EOSINOPHIL # BLD AUTO: 0.1 K/UL
EOSINOPHIL NFR BLD: 1.6 %
ERYTHROCYTE [DISTWIDTH] IN BLOOD BY AUTOMATED COUNT: 17.2 %
EST. GFR  (AFRICAN AMERICAN): >60 ML/MIN/1.73 M^2
EST. GFR  (NON AFRICAN AMERICAN): >60 ML/MIN/1.73 M^2
GLUCOSE SERPL-MCNC: 130 MG/DL
HCT VFR BLD AUTO: 27.9 %
HGB BLD-MCNC: 8.6 G/DL
HYPOCHROMIA BLD QL SMEAR: ABNORMAL
LYMPHOCYTES # BLD AUTO: 1.1 K/UL
LYMPHOCYTES NFR BLD: 15.5 %
MCH RBC QN AUTO: 27.1 PG
MCHC RBC AUTO-ENTMCNC: 30.8 G/DL
MCV RBC AUTO: 88 FL
MONOCYTES # BLD AUTO: 0.3 K/UL
MONOCYTES NFR BLD: 4.1 %
NEUTROPHILS # BLD AUTO: 5.7 K/UL
NEUTROPHILS NFR BLD: 78.6 %
OVALOCYTES BLD QL SMEAR: ABNORMAL
PLATELET # BLD AUTO: 435 K/UL
PLATELET BLD QL SMEAR: ABNORMAL
PMV BLD AUTO: 8.9 FL
POLYCHROMASIA BLD QL SMEAR: ABNORMAL
POTASSIUM SERPL-SCNC: 3.5 MMOL/L
PROT SERPL-MCNC: 6.7 G/DL
RBC # BLD AUTO: 3.17 M/UL
SODIUM SERPL-SCNC: 135 MMOL/L
STOMATOCYTES BLD QL SMEAR: PRESENT
WBC # BLD AUTO: 7.31 K/UL

## 2019-01-30 PROCEDURE — 99214 OFFICE O/P EST MOD 30 MIN: CPT | Mod: S$GLB,,, | Performed by: FAMILY MEDICINE

## 2019-01-30 PROCEDURE — 99999 PR PBB SHADOW E&M-EST. PATIENT-LVL IV: CPT | Mod: PBBFAC,,, | Performed by: FAMILY MEDICINE

## 2019-01-30 PROCEDURE — 86140 C-REACTIVE PROTEIN: CPT

## 2019-01-30 PROCEDURE — 99214 PR OFFICE/OUTPT VISIT, EST, LEVL IV, 30-39 MIN: ICD-10-PCS | Mod: S$GLB,,, | Performed by: FAMILY MEDICINE

## 2019-01-30 PROCEDURE — 85025 COMPLETE CBC W/AUTO DIFF WBC: CPT

## 2019-01-30 PROCEDURE — 82550 ASSAY OF CK (CPK): CPT

## 2019-01-30 PROCEDURE — 80053 COMPREHEN METABOLIC PANEL: CPT

## 2019-01-30 PROCEDURE — 99999 PR PBB SHADOW E&M-EST. PATIENT-LVL IV: ICD-10-PCS | Mod: PBBFAC,,, | Performed by: FAMILY MEDICINE

## 2019-01-30 PROCEDURE — 36415 COLL VENOUS BLD VENIPUNCTURE: CPT

## 2019-01-30 NOTE — PROGRESS NOTES
Transitional Care Note  Subjective:       Patient ID: Marvin Ray is a 58 y.o. male.  Chief Complaint: hospital f/u    Family and/or Caretaker present at visit?  Yes.  Diagnostic tests reviewed/disposition: No diagnosic tests pending after this hospitalization.  Disease/illness education: discussed CHF and ID concerns  Home health/community services discussion/referrals: Patient has home health established at [place].   Establishment or re-establishment of referral orders for community resources: No other necessary community resources.   Discussion with other health care providers: No discussion with other health care providers necessary.   Patient presenting after being discharged from LTAC last week.  Patient suffers with CAD/CHF/Ascites, and chronic venous ulcer to [facility].  He is followed by cardiology and vascular surgery.  He was being seen in Wound Care center at time of admission, but has not returned as he just got out of LTAC.  He currently has home health, but states they are not coming out regularly.  He is followed by ID as his tissue cultures had multiple organisms.  No fever, chills, or shortness of breath.  He continues to have tightness in his abdomen after 10L removed via paracentesis.        Review of Systems   Constitutional: Negative for chills, diaphoresis, fatigue and fever.   HENT: Negative for congestion, ear pain, sinus pain and sore throat.    Eyes: Negative for pain, discharge and itching.   Respiratory: Negative for chest tightness, shortness of breath and wheezing.    Cardiovascular: Negative for chest pain.   Gastrointestinal: Positive for abdominal distention. Negative for abdominal pain and constipation.   Genitourinary: Negative for dysuria, enuresis and flank pain.   Musculoskeletal: Negative for arthralgias and myalgias.   Skin: Positive for pallor.   Psychiatric/Behavioral: Negative for agitation and behavioral problems.       Objective:      Physical Exam   Constitutional: He  is oriented to person, place, and time. He appears well-developed and well-nourished.   HENT:   Head: Normocephalic and atraumatic.   Eyes: Conjunctivae and EOM are normal. Pupils are equal, round, and reactive to light.   Neck: Normal range of motion. Neck supple.   Cardiovascular: Normal rate and regular rhythm.   Pulmonary/Chest: Effort normal and breath sounds normal.   Abdominal: Bowel sounds are normal. He exhibits distension.   Neurological: He is alert and oriented to person, place, and time.   Skin: Skin is warm and dry.   Psychiatric: He has a normal mood and affect.       Assessment:       1. DM type 2 with diabetic peripheral neuropathy    2. Non-pressure chronic ulcer of other part of left foot with fat layer exposed    3. Coronary artery disease involving native coronary artery of native heart without angina pectoris    4. Essential hypertension    5. MDR Acinetobacter baumannii infection    6. Subacute osteomyelitis of left foot    7. Diabetic foot infection    8. Type 2 diabetes mellitus with left diabetic foot ulcer    9. Debility    10. Other ascites        Plan:       1.  Follow up with vascular surgery tomorrow  2.  Continue abx per ID orders  3.  Consult IR for asictes  4.  Continue all medications as prescribed  5.  Follow up with cardiology as scheduled  6.  Will have infusion center draw labs from PICC tomorrow  7.  Patient or family to call with any concerns      Rubén Davis MD

## 2019-01-30 NOTE — PATIENT INSTRUCTIONS
1.  Keep appointment with vascular tomorrow  2.  Get blood drawn at infusion center tomorrow  3.  Call with any concerns  4.  Weigh daily

## 2019-01-31 ENCOUNTER — OFFICE VISIT (OUTPATIENT)
Dept: VASCULAR SURGERY | Facility: CLINIC | Age: 59
End: 2019-01-31
Payer: COMMERCIAL

## 2019-01-31 ENCOUNTER — INFUSION (OUTPATIENT)
Dept: INFUSION THERAPY | Facility: HOSPITAL | Age: 59
End: 2019-01-31
Attending: FAMILY MEDICINE
Payer: COMMERCIAL

## 2019-01-31 ENCOUNTER — OFFICE VISIT (OUTPATIENT)
Dept: ENDOCRINOLOGY | Facility: CLINIC | Age: 59
End: 2019-01-31
Payer: COMMERCIAL

## 2019-01-31 VITALS
WEIGHT: 201 LBS | SYSTOLIC BLOOD PRESSURE: 103 MMHG | HEIGHT: 70 IN | HEART RATE: 87 BPM | BODY MASS INDEX: 28.77 KG/M2 | DIASTOLIC BLOOD PRESSURE: 61 MMHG

## 2019-01-31 VITALS
HEIGHT: 70 IN | DIASTOLIC BLOOD PRESSURE: 74 MMHG | BODY MASS INDEX: 28.77 KG/M2 | WEIGHT: 201 LBS | SYSTOLIC BLOOD PRESSURE: 120 MMHG

## 2019-01-31 DIAGNOSIS — E78.5 HYPERLIPIDEMIA, UNSPECIFIED HYPERLIPIDEMIA TYPE: ICD-10-CM

## 2019-01-31 DIAGNOSIS — E11.42 DM TYPE 2 WITH DIABETIC PERIPHERAL NEUROPATHY: Chronic | ICD-10-CM

## 2019-01-31 DIAGNOSIS — L97.522 NON-PRESSURE CHRONIC ULCER OF OTHER PART OF LEFT FOOT WITH FAT LAYER EXPOSED: ICD-10-CM

## 2019-01-31 DIAGNOSIS — I73.9 PVD (PERIPHERAL VASCULAR DISEASE): Chronic | ICD-10-CM

## 2019-01-31 DIAGNOSIS — S91.302S OPEN WOUND OF LEFT FOOT, SEQUELA: Primary | ICD-10-CM

## 2019-01-31 LAB
ALBUMIN SERPL BCP-MCNC: 2.2 G/DL
ALP SERPL-CCNC: 124 U/L
ALT SERPL W/O P-5'-P-CCNC: 55 U/L
ANION GAP SERPL CALC-SCNC: 9 MMOL/L
AST SERPL-CCNC: 69 U/L
BASOPHILS # BLD AUTO: 0.02 K/UL
BASOPHILS NFR BLD: 0.3 %
BILIRUB SERPL-MCNC: 0.4 MG/DL
BUN SERPL-MCNC: 57 MG/DL
CALCIUM SERPL-MCNC: 8.5 MG/DL
CHLORIDE SERPL-SCNC: 97 MMOL/L
CO2 SERPL-SCNC: 26 MMOL/L
CREAT SERPL-MCNC: 1.3 MG/DL
DIFFERENTIAL METHOD: ABNORMAL
EOSINOPHIL # BLD AUTO: 0.1 K/UL
EOSINOPHIL NFR BLD: 1.9 %
ERYTHROCYTE [DISTWIDTH] IN BLOOD BY AUTOMATED COUNT: 17.4 %
EST. GFR  (AFRICAN AMERICAN): >60 ML/MIN/1.73 M^2
EST. GFR  (NON AFRICAN AMERICAN): 59 ML/MIN/1.73 M^2
GLUCOSE SERPL-MCNC: 199 MG/DL
HCT VFR BLD AUTO: 27.1 %
HGB BLD-MCNC: 8.5 G/DL
LYMPHOCYTES # BLD AUTO: 0.9 K/UL
LYMPHOCYTES NFR BLD: 14.2 %
MCH RBC QN AUTO: 27.5 PG
MCHC RBC AUTO-ENTMCNC: 31.4 G/DL
MCV RBC AUTO: 88 FL
MONOCYTES # BLD AUTO: 0.3 K/UL
MONOCYTES NFR BLD: 4 %
NEUTROPHILS # BLD AUTO: 5 K/UL
NEUTROPHILS NFR BLD: 79.6 %
PLATELET # BLD AUTO: 440 K/UL
PMV BLD AUTO: 8.5 FL
POTASSIUM SERPL-SCNC: 3.1 MMOL/L
PROT SERPL-MCNC: 6.9 G/DL
RBC # BLD AUTO: 3.09 M/UL
SODIUM SERPL-SCNC: 132 MMOL/L
WBC # BLD AUTO: 6.32 K/UL

## 2019-01-31 PROCEDURE — 99214 OFFICE O/P EST MOD 30 MIN: CPT | Mod: S$GLB,,, | Performed by: NURSE PRACTITIONER

## 2019-01-31 PROCEDURE — 99999 PR PBB SHADOW E&M-EST. PATIENT-LVL V: ICD-10-PCS | Mod: PBBFAC,,, | Performed by: NURSE PRACTITIONER

## 2019-01-31 PROCEDURE — 3008F BODY MASS INDEX DOCD: CPT | Mod: CPTII,S$GLB,, | Performed by: SURGERY

## 2019-01-31 PROCEDURE — 3074F SYST BP LT 130 MM HG: CPT | Mod: CPTII,S$GLB,, | Performed by: SURGERY

## 2019-01-31 PROCEDURE — 3045F PR MOST RECENT HEMOGLOBIN A1C LEVEL 7.0-9.0%: CPT | Mod: CPTII,S$GLB,, | Performed by: NURSE PRACTITIONER

## 2019-01-31 PROCEDURE — 63600175 PHARM REV CODE 636 W HCPCS: Performed by: FAMILY MEDICINE

## 2019-01-31 PROCEDURE — 3078F PR MOST RECENT DIASTOLIC BLOOD PRESSURE < 80 MM HG: ICD-10-PCS | Mod: CPTII,S$GLB,, | Performed by: SURGERY

## 2019-01-31 PROCEDURE — 3078F DIAST BP <80 MM HG: CPT | Mod: CPTII,S$GLB,, | Performed by: SURGERY

## 2019-01-31 PROCEDURE — 3008F PR BODY MASS INDEX (BMI) DOCUMENTED: ICD-10-PCS | Mod: CPTII,S$GLB,, | Performed by: SURGERY

## 2019-01-31 PROCEDURE — 36592 COLLECT BLOOD FROM PICC: CPT

## 2019-01-31 PROCEDURE — 3074F PR MOST RECENT SYSTOLIC BLOOD PRESSURE < 130 MM HG: ICD-10-PCS | Mod: CPTII,S$GLB,, | Performed by: NURSE PRACTITIONER

## 2019-01-31 PROCEDURE — 3008F BODY MASS INDEX DOCD: CPT | Mod: CPTII,S$GLB,, | Performed by: NURSE PRACTITIONER

## 2019-01-31 PROCEDURE — 3074F SYST BP LT 130 MM HG: CPT | Mod: CPTII,S$GLB,, | Performed by: NURSE PRACTITIONER

## 2019-01-31 PROCEDURE — 99214 OFFICE O/P EST MOD 30 MIN: CPT | Mod: S$GLB,,, | Performed by: SURGERY

## 2019-01-31 PROCEDURE — 3078F DIAST BP <80 MM HG: CPT | Mod: CPTII,S$GLB,, | Performed by: NURSE PRACTITIONER

## 2019-01-31 PROCEDURE — 99999 PR PBB SHADOW E&M-EST. PATIENT-LVL IV: CPT | Mod: PBBFAC,,, | Performed by: SURGERY

## 2019-01-31 PROCEDURE — 3078F PR MOST RECENT DIASTOLIC BLOOD PRESSURE < 80 MM HG: ICD-10-PCS | Mod: CPTII,S$GLB,, | Performed by: NURSE PRACTITIONER

## 2019-01-31 PROCEDURE — 99999 PR PBB SHADOW E&M-EST. PATIENT-LVL V: CPT | Mod: PBBFAC,,, | Performed by: NURSE PRACTITIONER

## 2019-01-31 PROCEDURE — 25000003 PHARM REV CODE 250: Performed by: FAMILY MEDICINE

## 2019-01-31 PROCEDURE — 3045F PR MOST RECENT HEMOGLOBIN A1C LEVEL 7.0-9.0%: ICD-10-PCS | Mod: CPTII,S$GLB,, | Performed by: NURSE PRACTITIONER

## 2019-01-31 PROCEDURE — A4216 STERILE WATER/SALINE, 10 ML: HCPCS | Performed by: FAMILY MEDICINE

## 2019-01-31 PROCEDURE — 3074F PR MOST RECENT SYSTOLIC BLOOD PRESSURE < 130 MM HG: ICD-10-PCS | Mod: CPTII,S$GLB,, | Performed by: SURGERY

## 2019-01-31 PROCEDURE — 99214 PR OFFICE/OUTPT VISIT, EST, LEVL IV, 30-39 MIN: ICD-10-PCS | Mod: S$GLB,,, | Performed by: SURGERY

## 2019-01-31 PROCEDURE — 80053 COMPREHEN METABOLIC PANEL: CPT

## 2019-01-31 PROCEDURE — 3008F PR BODY MASS INDEX (BMI) DOCUMENTED: ICD-10-PCS | Mod: CPTII,S$GLB,, | Performed by: NURSE PRACTITIONER

## 2019-01-31 PROCEDURE — 85025 COMPLETE CBC W/AUTO DIFF WBC: CPT

## 2019-01-31 PROCEDURE — 99999 PR PBB SHADOW E&M-EST. PATIENT-LVL IV: ICD-10-PCS | Mod: PBBFAC,,, | Performed by: SURGERY

## 2019-01-31 PROCEDURE — 99214 PR OFFICE/OUTPT VISIT, EST, LEVL IV, 30-39 MIN: ICD-10-PCS | Mod: S$GLB,,, | Performed by: NURSE PRACTITIONER

## 2019-01-31 RX ORDER — SODIUM CHLORIDE 0.9 % (FLUSH) 0.9 %
10 SYRINGE (ML) INJECTION
Status: DISCONTINUED | OUTPATIENT
Start: 2019-01-31 | End: 2019-01-31 | Stop reason: HOSPADM

## 2019-01-31 RX ORDER — INSULIN ASPART 100 [IU]/ML
INJECTION, SOLUTION INTRAVENOUS; SUBCUTANEOUS
Qty: 2.7 ML | Refills: 11
Start: 2019-01-31 | End: 2019-12-10 | Stop reason: ALTCHOICE

## 2019-01-31 RX ORDER — HEPARIN 100 UNIT/ML
500 SYRINGE INTRAVENOUS
Status: COMPLETED | OUTPATIENT
Start: 2019-01-31 | End: 2019-01-31

## 2019-01-31 RX ADMIN — HEPARIN SODIUM (PORCINE) LOCK FLUSH IV SOLN 100 UNIT/ML 500 UNITS: 100 SOLUTION at 02:01

## 2019-01-31 RX ADMIN — Medication 10 ML: at 02:01

## 2019-01-31 NOTE — PATIENT INSTRUCTIONS
Continue novolog with correction scale.   Increase Soliqua starting tomorrow to 38 units every morning. After 4 days, if most blood sugars are still above 150, increase to 42 units every morning.   Send a blood sugar log in 2 weeks.     Return to clinic in 5 months.

## 2019-01-31 NOTE — LETTER
January 31, 2019        Rubén Davis MD  [address] [facility] [address]             [facility]  [address]  [facility] [address]  Phone: [phone]  Fax: [phone]   Patient: Marvin Ray   MR Number: [id number]   YOB: 1960   Date of Visit: 1/31/2019       Dear Dr. Davis:    Thank you for referring Marvin Ray to me for evaluation. Below are the relevant portions of my assessment and plan of care.            If you have questions, please do not hesitate to call me. I look forward to following Marvin along with you.    Sincerely,      Mitch Chan MD           CC  No Recipients

## 2019-01-31 NOTE — PROGRESS NOTES
Mitch Chan MD [facility]                         01/31/2019    HPI:  Marvin Ray is a 58 y.o. male with   Patient Active Problem List   Diagnosis    Epiretinal membrane, both eyes    Nuclear sclerotic cataract of right eye    Pseudophakia, left eye    Ptosis, left eyelid    DM type 2 with diabetic peripheral neuropathy    HLD (hyperlipidemia)    Neovascular glaucoma of both eyes    Tophaceous gout    Essential hypertension    CAD (coronary artery disease)    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 edema, with long-term current use of insulin    Neovascular glaucoma of left eye, severe stage    Statin myopathy    Neovascular glaucoma, right eye, mild stage    Left leg cellulitis    PVD (peripheral vascular disease)    Right wrist pain    Multiple open wounds of lower leg    Hepatosplenomegaly    Non-pressure chronic ulcer of other part of left foot with fat layer exposed    Type 2 diabetes mellitus with left diabetic foot ulcer    Open wound of left foot    Cellulitis of left lower extremity    Diabetic foot infection    Other ascites    Severe malnutrition    Goals of care, counseling/discussion    Alteration in skin integrity    MDR Acinetobacter baumannii infection    Takes dietary supplements    Intertriginous dermatitis associated with moisture    Debility    Infection due to multidrug-resistant Pseudomonas aeruginosa    Subacute osteomyelitis of left foot    being managed by PCP and specialists who is here today for evaluation of L foot wound.  Seen in hospital last mo with LLE cellulitis.  Treated with Abx.  Also had paracentesis for ascites with negative w/u per family.  Patient states location is L foot occurring for 1-2 mo, worsening foot wound although improvement in edema and erythema.  Associated signs and symptoms include pain and edema.   Quality is aching and severity is 5/10.  Symptoms began 10/2018 spontaneously with blistering and severe edema.  Alleviating factors include wound care and elevation.  Worsening factors include pressure.    Tobacco use: denies    1/4/19: s/p LLE angioplasty and multiple subsequent OR and bedside debridements.  On IV Abx per culture results.  Glucose better controlled.  No fevers.  Receiving local wound care with Santyl.    1/14/19:  Cont to receive local wound care.  Pseudomonas in tissue and bone cultures multidrug resistant other than aminoglycoside; d/w Dr. Velez with high risk of ESRD with 6 weeks of aminoglycoside treatment.  No F/C.    Interval history:  Pt without complaints.  Was started back on Bactrim.  Family doing wound care.    Past Medical History:   Diagnosis Date    Arthritis     Cellulitis     CKD (chronic kidney disease), stage III     Coronary artery disease     Diabetes mellitus     Diabetic retinopathy     Diabetic ulcer of left foot     Glaucoma     Gout     Hyperlipemia     Hypertension     ICD (implantable cardioverter-defibrillator) in place 11/02/2018    Left chest    Non-pressure chronic ulcer of other part of left foot with fat layer exposed 10/23/2018    PVD (peripheral vascular disease)     Type 2 diabetes mellitus with left diabetic foot ulcer 10/29/2018    Unsteady gait     uses a wheelchair     Past Surgical History:   Procedure Laterality Date    AHMED GLAUCOMA IMPLANT Left 2011    DONE AT [facility]    AMPUTATION, TOE Left 12/5/2018    [facility] at [facility]    AMPUTATION, TOES  2-5 Left 11/16/2018    [facility] at [facility] OR    BAERVELDT GLAUCOMA IMPLANT Left 2012    WITH CATARACT EXTRACTION//DONE AT [facility]    CARDIAC CATHETERIZATION Left 05/2016    CARDIAC DEFIBRILLATOR PLACEMENT Left 11/02/2018    CATARACT EXTRACTION W/  INTRAOCULAR LENS IMPLANT Left 2012    WITH BAERVEDT//DONE AT [facility]    CATARACT  EXTRACTION W/  INTRAOCULAR LENS IMPLANT Right 09/26/2018    COMPLEX ()    CB DESTRUCTION WITH CYCLO G6 Left 02/15/2017        CYST REMOVAL      DEBRIDEMENT, FOOT  12/28/2018    [facility] at [facility] OR    Exam under anesthesia, left foot debridement, left foot washout, possible left second through fifth metatarsal resection Left 12/28/2018    [facility] at [facility] OR    Exam under anesthesia, left foot debridement, washout and all other indicated procedures Left 12/5/2018    [facility] by [facility] at [facility] OR    EXCISION, LESION, METATARSAL BONE  12/28/2018    Per[facility] by [facility] at [facility] OR    HEART CATH-LEFT N/A 5/6/2016    [facility] at [facility] CATH LAB    INCISION AND DRAINAGE Left 11/16/2018    [facility] at [facility] OR    Incision and Drainage, left lower extremity debridement, washout Left 11/14/2018    [facility] by [facility] at [facility] OR    INSERTION, ICD GENERATOR, SINGLE CHAMBER N/A 11/2/2018    Performed by Pernell Greer MD at [facility] CATH LAB    INSERTION, IOL PROSTHESIS Right 9/26/2018    [facility] at [facility] OR 1ST FLR    PHACOEMULSIFICATION, CATARACT Right 9/26/2018    Per[facility] at [facility] OR 1ST FLR    Right foot surgery  10/2014    TOE AMPUTATION Right     first and second    TONSILLECTOMY      TRABECULECTOMY/G 6 LASER Left 2/15/2017    [facility] at [facility] OR [facility]ST FLR     Family History   Problem Relation Age of Onset    Diabetes Mother     Heart disease Brother     No Known Problems Father     No Known Problems Sister     No Known Problems Maternal Aunt     No Known Problems Maternal Uncle     No Known Problems Paternal Aunt     No Known Problems Paternal Uncle     No Known Problems Maternal Grandmother     No Known Problems Maternal Grandfather      No Known Problems Paternal Grandmother     No Known Problems Paternal Grandfather     Anemia Neg Hx     Arrhythmia Neg Hx     Asthma Neg Hx     Clotting disorder Neg Hx     Fainting Neg Hx     Heart attack Neg Hx     Heart failure Neg Hx     Hyperlipidemia Neg Hx     Hypertension Neg Hx     Stroke Neg Hx     Atrial Septal Defect Neg Hx     Amblyopia Neg Hx     Blindness Neg Hx     Glaucoma Neg Hx     Macular degeneration Neg Hx     Retinal detachment Neg Hx     Strabismus Neg Hx      Social History     Socioeconomic History    Marital status: Legally      Spouse name: Not on file    Number of children: Not on file    Years of education: 14    Highest education level: Not on file   Social Needs    Financial resource strain: Not on file    Food insecurity - worry: Not on file    Food insecurity - inability: Not on file    Transportation needs - medical: Not on file    Transportation needs - non-medical: Not on file   Occupational History    Not on file   Tobacco Use    Smoking status: Former Smoker     Packs/day: 1.00     Years: 3.00     Pack years: 3.00     Types: Cigarettes     Last attempt to quit: 1984     Years since quittin.5    Smokeless tobacco: Never Used   Substance and Sexual Activity    Alcohol use: Yes     Alcohol/week: 0.6 oz     Types: 1 Cans of beer per week     Comment: Occasional    Drug use: No    Sexual activity: Not Currently   Other Topics Concern    Not on file   Social History Narrative    Not on file       Current Outpatient Medications:     allopurinol (ZYLOPRIM) 300 MG tablet, Take 1 tablet (300 mg total) by mouth once daily., Disp: 30 tablet, Rfl: 3    aspirin 81 MG Chew, Take 81 mg by mouth once daily., Disp: , Rfl:     brimonidine 0.1% (ALPHAGAN P) 0.1 % Drop, Place 1 drop into both eyes 2 (two) times daily., Disp: 3 mL, Rfl: 11    carvedilol (COREG) 3.125 MG tablet, Take 1 tablet (3.125 mg total) by mouth 2 (two) times daily.,  "Disp: 60 tablet, Rfl: 11    clopidogrel (PLAVIX) 75 mg tablet, Take 1 tablet (75 mg total) by mouth once daily., Disp: 30 tablet, Rfl: 11    coenzyme Q10 100 mg capsule, Take 100 mg by mouth every morning., Disp: , Rfl:     dorzolamide-timolol 2-0.5% (COSOPT) 22.3-6.8 mg/mL ophthalmic solution, Place 1 drop into both eyes 2 (two) times daily., Disp: 1 Bottle, Rfl: 4    ezetimibe (ZETIA) 10 mg tablet, Take 1 tablet (10 mg total) by mouth once daily., Disp: 30 tablet, Rfl: 2    fish oil-omega-3 fatty acids 300-1,000 mg capsule, Take 1 capsule by mouth 2 (two) times daily., Disp: 60 capsule, Rfl: 3    furosemide (LASIX) 40 MG tablet, Take 1.5 tablets (60 mg total) by mouth 2 (two) times daily., Disp: 90 tablet, Rfl: 11    insulin aspart U-100 (NOVOLOG) 100 unit/mL InPn pen, Use on premeal readings: 150-200=+2, 201-250=+4; 251-300=+6; 301-350=+8, over 350=+10 units, Disp: 2.7 mL, Rfl: 11    insulin glargine-lixisenatide (SOLIQUA 100/33) 100 unit-33 mcg/mL InPn, Inject 42 units every morning, Disp: 5 Syringe, Rfl: 5    metOLazone (ZAROXOLYN) 5 MG tablet, Take 1 tablet (5 mg total) by mouth every Monday and Thursday. 30 minutes before lasix., Disp: 30 tablet, Rfl: 2    miconazole NITRATE 2 % (MICOTIN) 2 % top powder, Apply topically 2 (two) times daily., Disp: , Rfl: 0    MULTIVIT,THER IRON,CA,FA & MIN (MULTIVITAMIN) Tab, Take 1 tablet by mouth every morning. , Disp: , Rfl:     oxyCODONE-acetaminophen (PERCOCET) 5-325 mg per tablet, Take 1 tablet by mouth every 6 (six) hours as needed for Pain., Disp: 45 tablet, Rfl: 0    pen needle, diabetic 31 gauge x 1/4" Ndle, Use as directed, Disp: 100 each, Rfl: 11    prednisoLONE acetate (PRED FORTE) 1 % DrpS, Place 1 drop into the right eye once daily., Disp: 1 mL, Rfl: 2    senna-docusate 8.6-50 mg (PERICOLACE) 8.6-50 mg per tablet, Take 1 tablet by mouth 2 (two) times daily., Disp: , Rfl:     sodium chloride 0.9% SolP 50 mL with DAPTOmycin 500 mg SolR 750 mg, " Inject 750 mg into the vein once daily. for 14 days, Disp: 14 Dose, Rfl: 1    sulfamethoxazole-trimethoprim 800-160mg (BACTRIM DS) 800-160 mg Tab, Take 1 tablet by mouth 2 (two) times daily. for 14 days, Disp: 28 tablet, Rfl: 0  No current facility-administered medications for this visit.     Facility-Administered Medications Ordered in Other Visits:     lactated ringers infusion, , Intravenous, Continuous, Tam Reynolds MD    lidocaine (PF) 10 mg/ml (1%) injection 10 mg, 1 mL, Intradermal, Once, Tam Reynolds MD    REVIEW OF SYSTEMS:  General: No fevers or chills; ENT: No sore throat; Allergy and Immunology: no persistent infections; Hematological and Lymphatic: No history of bleeding or easy bruising; Endocrine: negative; Respiratory: no cough, shortness of breath, or wheezing; Cardiovascular: no chest pain or dyspnea on exertion; Gastrointestinal: no abdominal pain/back, change in bowel habits, or bloody stools; Genito-Urinary: no dysuria, trouble voiding, or hematuria; Musculoskeletal: negative; Neurological: no TIA or stroke symptoms; Psychiatric: no nervousness, anxiety or depression.    PHYSICAL EXAM:                General appearance:  Alert, well-appearing, and in no distress.  Oriented to person, place, and time                    Neurological: Normal speech, no focal findings noted; CN II - XII grossly intact. RLE with sensation to light touch, LLE with sensation to light touch.            Musculoskeletal: Digits/nail without cyanosis/clubbing.  Strength 5/5 BLE.                    Neck: Supple, no significant adenopathy                  Chest:  Clear to auscultation, no wheezes, rales or rhonchi, symmetric air entry. No use of accessory muscles               Cardiac: Normal rate and regular rhythm, S1 and S2 normal            Abdomen: Soft, nontender, nondistended, no masses or organomegaly, no hernia     No rebound tenderness noted; bowel sounds normal     No groin adenopathy      Extremities:    2+ R femoral pulse, 2+ L femoral pulse     doppler+ R popliteal pulse, doppler+ L popliteal pulse     doppler+ R PT pulse, doppler+ L PT pulse     doppler+ R DP pulse, doppler+ L DP pulse     1+ RLE edema, 2+ LLE edema    Skin: LLE with healed superficial anterior lower leg wounds, minimal brawny edema, +large foot wound with fibrinous and improved granulation tissue, no odor, no purulence or erythema    LAB RESULTS:  No results found for: CBC  Lab Results   Component Value Date    LABPROT 11.2 12/27/2018    INR 1.1 12/27/2018     Lab Results   Component Value Date     (L) 01/30/2019    K 3.5 01/30/2019    CL 99 01/30/2019    CO2 27 01/30/2019     (H) 01/30/2019    BUN 57 (H) 01/30/2019    CREATININE 1.2 01/30/2019    CALCIUM 8.4 (L) 01/30/2019    ANIONGAP 9 01/30/2019    EGFRNONAA >60 01/30/2019     Lab Results   Component Value Date    WBC 7.31 01/30/2019    RBC 3.17 (L) 01/30/2019    HGB 8.6 (L) 01/30/2019    HCT 27.9 (L) 01/30/2019    MCV 88 01/30/2019    MCH 27.1 01/30/2019    MCHC 30.8 (L) 01/30/2019    RDW 17.2 (H) 01/30/2019     (H) 01/30/2019    MPV 8.9 (L) 01/30/2019    GRAN 5.7 01/30/2019    GRAN 78.6 (H) 01/30/2019    LYMPH 1.1 01/30/2019    LYMPH 15.5 (L) 01/30/2019    MONO 0.3 01/30/2019    MONO 4.1 01/30/2019    EOS 0.1 01/30/2019    BASO 0.04 01/30/2019    EOSINOPHIL 1.6 01/30/2019    BASOPHIL 0.5 01/30/2019    DIFFMETHOD Automated 01/30/2019     .  Lab Results   Component Value Date    HGBA1C 8.3 (H) 10/30/2018       IMAGING:  All pertinent imaging has been reviewed and interpreted independently.    BLE arterial US 1/2019: No focal stenosis    IMP/PLAN:  58 y.o. male with   Patient Active Problem List   Diagnosis    Epiretinal membrane, both eyes    Nuclear sclerotic cataract of right eye    Pseudophakia, left eye    Ptosis, left eyelid    DM type 2 with diabetic peripheral neuropathy    HLD (hyperlipidemia)    Neovascular glaucoma of both eyes    Tophaceous gout     Essential hypertension    CAD (coronary artery disease)    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 edema, with long-term current use of insulin    Neovascular glaucoma of left eye, severe stage    Statin myopathy    Neovascular glaucoma, right eye, mild stage    Left leg cellulitis    PVD (peripheral vascular disease)    Right wrist pain    Multiple open wounds of lower leg    Hepatosplenomegaly    Non-pressure chronic ulcer of other part of left foot with fat layer exposed    Type 2 diabetes mellitus with left diabetic foot ulcer    Open wound of left foot    Cellulitis of left lower extremity    Diabetic foot infection    Other ascites    Severe malnutrition    Goals of care, counseling/discussion    Alteration in skin integrity    MDR Acinetobacter baumannii infection    Takes dietary supplements    Intertriginous dermatitis associated with moisture    Debility    Infection due to multidrug-resistant Pseudomonas aeruginosa    Subacute osteomyelitis of left foot    being managed by PCP and specialists who is here today for evaluation of L foot wound.    -L foot wound slowly healing, multifactorial due to diabetes, PVD and venous insufficiency with improvement in granulation tissue although bacteria resistant to all safe treatment options - d/w pt and family re: risk of bacteremia and progression of infection with untreated Pseudomonas in wound/bone cultures despite improvement in perfusion and wound bed; amputation options are limited due to venous stasis changes to below left knee leg - Pt states he desires to continue Abx and local wound care; does not desire amputation at this time  -He would benefit from further debridement and washout with repeat cultures  -Spoke with Dr. Velez without safe alternative treatment options  -Recommend BLE Xeroform and dry kerlix wraps twice daily with elevation of LLE above level of the  heart  -Low sodium diet  -Strict glycemic control  -Cont Abx per ID recs  -Plan for L foot wound debridement 2/13/19 - cont local wound care to L foot wound    I spent 20 minutes evaluating this patient and greater than 50% of the time was spent counseling, coordinator care and discussing the plan of care.  All questions were answered and patient stated understanding with agreement with the above treatment plan.    Mitch Chan MD [facility]  Vascular and Endovascular Surgery

## 2019-01-31 NOTE — PROGRESS NOTES
CC: This 58 y.o. White male  is here for evaluation of  T2DM along with comorbidities indicated in the Visit Diagnosis section of this encounter.    HPI: Marvin Ray was diagnosed with T2DM in his late 20s. Pt was initially diet controlled, then required oral medications, then eventually required insulin.     A1c in July 2018 was high at > 14%  because he couldn't afford insulin (Lantus ~$600). States his insurance plan is very expensive this and he makes a low salary. Also has a deductible of at least 1k.     Prior visit on 9/7/18  He started Soliqua and tolerating it well without nausea.   Plan Continue current regimen.   Test bg 2x/day - fasting and again before/2hours post dinner.   rtc in 2 mo with labs prior.     Prior visit on 10/19/18 arrives with his sister Chloé.   He returns early to clinic today because he was discharged from the hospital earlier this week. Admitted x 7 days for anasarca, CHF, right leg cellulitis, and now ulcers to both legs.   He was discharged from hospital on tresiba 24 units once daily 2 days ago. Fasting glucoses are 184 and 128 today.  prelunch 219. His appetite is fine. Turkey and egg omelette, 1 slice of toast, strawberries.   Plan Will consider keeping you on Tresiba vs. Switching you back to Soliqua after blood sugar log is reviewed. Pt was advised to start soliqua and increase to 34 units after lov.     Interval history arrives with his sister Chloé.   He was just discharged from hospital last week. Has been admitted to hospital or LTAC often over the last few months. Had amputations to all 5 toes to left foot since r/t osteoyelitis. Also suffered cardiac arrest in Dec.     He resumed Soliqua a week after after hospital discharge.       LAST DIABETES EDUCATION: years ago     PRESCRIBED DIABETES MEDICATIONS:  Soliqua 34 units every morning, Novolog per sliding scale - Use on premeal readings: 150-200=+2, 201-250=+4; 251-300=+6; 301-350=+8, over 350=+10 units        "medication doses - No    DM COMPLICATIONS: nephropathy, retinopathy and peripheral neuropathy  toe amputations (2/2 infection)    SIGNIFICANT DIABETES MED HISTORY:   Constipation on Onglyza     SELF MONITORING BLOOD GLUCOSE:           HYPOGLYCEMIC EPISODES: none      CURRENT DIET: drinks water and black tea with lemon. Eats 3 meals/day. Believes his diet is healthy, tries to watch carbs.     CURRENT EXERCISE: none.     SOCIAL:      /61 (BP Location: Left arm, Patient Position: Sitting, BP Method: Large (Automatic))   Pulse 87   Ht 5' 10" (1.778 m)   Wt 91.2 kg (201 lb)   BMI 28.84 kg/m²       ROS:   CONSTITUTIONAL: Appetite good, denies fatigue  RESPIRATORY: No shortness of breath  CARDIAC: No chest pain  OTHER: n/a      PHYSICAL EXAM:  GENERAL: Well developed, well nourished. No acute distress.   PSYCH: AAOx3, appropriate mood and affect, conversant, casually-groomed. Judgement and insight good. Appears chronically ill. Arrives in w/c   NEURO: Cranial nerves grossly intact. Speech clear, no tremor.   CHEST: Respirations even and unlabored.         Hemoglobin A1C   Date Value Ref Range Status   10/30/2018 8.3 (H) 4.0 - 5.6 % Final     Comment:     ADA Screening Guidelines:  5.7-6.4%  Consistent with prediabetes  >or=6.5%  Consistent with diabetes  High levels of fetal hemoglobin interfere with the HbA1C  assay. Heterozygous hemoglobin variants (HbS, HgC, etc)do  not significantly interfere with this assay.   However, presence of multiple variants may affect accuracy.     07/27/2018 >14.0 (H) 4.0 - 5.6 % Final     Comment:     ADA Screening Guidelines:  5.7-6.4%  Consistent with prediabetes  >or=6.5%  Consistent with diabetes  High levels of fetal hemoglobin interfere with the HbA1C  assay. Heterozygous hemoglobin variants (HbS, HgC, etc)do  not significantly interfere with this assay.   However, presence of multiple variants may affect accuracy.     08/25/2016 9.0 (H) 4.5 - 6.2 % Final     Comment:     " According to ADA guidelines, hemoglobin A1C <7.0% represents  optimal control in non-pregnant diabetic patients.  Different  metrics may apply to specific populations.   Standards of Medical Care in Diabetes - 2016.  For the purpose of screening for the presence of diabetes:  <5.7%     Consistent with the absence of diabetes  5.7-6.4%  Consistent with increasing risk for diabetes   (prediabetes)  >or=6.5%  Consistent with diabetes  Currently no consensus exists for use of hemoglobin A1C  for diagnosis of diabetes for children.             Chemistry        Component Value Date/Time     (L) 01/30/2019 1630    K 3.5 01/30/2019 1630    CL 99 01/30/2019 1630    CO2 27 01/30/2019 1630    BUN 57 (H) 01/30/2019 1630    CREATININE 1.2 01/30/2019 1630     (H) 01/30/2019 1630        Component Value Date/Time    CALCIUM 8.4 (L) 01/30/2019 1630    ALKPHOS 126 01/30/2019 1630    AST 78 (H) 01/30/2019 1630    ALT 64 (H) 01/30/2019 1630    BILITOT 0.4 01/30/2019 1630    ESTGFRAFRICA >60 01/30/2019 1630    EGFRNONAA >60 01/30/2019 1630          Lab Results   Component Value Date    LDLCALC 71.2 10/30/2018        Ref. Range 7/27/2018 08:20   Cholesterol Latest Ref Range: 120 - 199 mg/dL 151   HDL Latest Ref Range: 40 - 75 mg/dL 29 (L)   LDL Cholesterol Latest Ref Range: 63.0 - 159.0 mg/dL 105.4   Total Cholesterol/HDL Ratio Latest Ref Range: 2.0 - 5.0  5.2 (H)   Triglycerides Latest Ref Range: 30 - 150 mg/dL 83     Lab Results   Component Value Date    MICALBCREAT 4564.0 (H) 07/27/2018           STANDARDS of CARE:        ASA:               Last eye exam:       ASSESSMENT and PLAN:    A1C GOAL: < 7 %       1. Type 2 diabetes, uncontrolled, with neuropathy  Continue novolog with correction scale.   Increase Soliqua starting tomorrow to 38 units every morning. After 4 days, if most blood sugars are still above 150, increase to 42 units every morning.   Send a blood sugar log in 2 weeks.     Return to clinic in 5 months.       2. Hyperlipidemia, unspecified hyperlipidemia type  Continue zetia and fish oil.    3. PVD (peripheral vascular disease)  Followed by Vascular.     Improve glycemic control.            No orders of the defined types were placed in this encounter.       Follow-up in about 5 months (around 6/30/2019).

## 2019-01-31 NOTE — H&P (VIEW-ONLY)
Mitch Chan MD [facility]                         01/31/2019    HPI:  Marvin Ray is a 58 y.o. male with   Patient Active Problem List   Diagnosis    Epiretinal membrane, both eyes    Nuclear sclerotic cataract of right eye    Pseudophakia, left eye    Ptosis, left eyelid    DM type 2 with diabetic peripheral neuropathy    HLD (hyperlipidemia)    Neovascular glaucoma of both eyes    Tophaceous gout    Essential hypertension    CAD (coronary artery disease)    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 edema, with long-term current use of insulin    Neovascular glaucoma of left eye, severe stage    Statin myopathy    Neovascular glaucoma, right eye, mild stage    Left leg cellulitis    PVD (peripheral vascular disease)    Right wrist pain    Multiple open wounds of lower leg    Hepatosplenomegaly    Non-pressure chronic ulcer of other part of left foot with fat layer exposed    Type 2 diabetes mellitus with left diabetic foot ulcer    Open wound of left foot    Cellulitis of left lower extremity    Diabetic foot infection    Other ascites    Severe malnutrition    Goals of care, counseling/discussion    Alteration in skin integrity    MDR Acinetobacter baumannii infection    Takes dietary supplements    Intertriginous dermatitis associated with moisture    Debility    Infection due to multidrug-resistant Pseudomonas aeruginosa    Subacute osteomyelitis of left foot    being managed by PCP and specialists who is here today for evaluation of L foot wound.  Seen in hospital last mo with LLE cellulitis.  Treated with Abx.  Also had paracentesis for ascites with negative w/u per family.  Patient states location is L foot occurring for 1-2 mo, worsening foot wound although improvement in edema and erythema.  Associated signs and symptoms include pain and edema.   Quality is aching and severity is 5/10.  Symptoms began 10/2018 spontaneously with blistering and severe edema.  Alleviating factors include wound care and elevation.  Worsening factors include pressure.    Tobacco use: denies    1/4/19: s/p LLE angioplasty and multiple subsequent OR and bedside debridements.  On IV Abx per culture results.  Glucose better controlled.  No fevers.  Receiving local wound care with Santyl.    1/14/19:  Cont to receive local wound care.  Pseudomonas in tissue and bone cultures multidrug resistant other than aminoglycoside; d/w Dr. Velez with high risk of ESRD with 6 weeks of aminoglycoside treatment.  No F/C.    Interval history:  Pt without complaints.  Was started back on Bactrim.  Family doing wound care.    Past Medical History:   Diagnosis Date    Arthritis     Cellulitis     CKD (chronic kidney disease), stage III     Coronary artery disease     Diabetes mellitus     Diabetic retinopathy     Diabetic ulcer of left foot     Glaucoma     Gout     Hyperlipemia     Hypertension     ICD (implantable cardioverter-defibrillator) in place 11/02/2018    Left chest    Non-pressure chronic ulcer of other part of left foot with fat layer exposed 10/23/2018    PVD (peripheral vascular disease)     Type 2 diabetes mellitus with left diabetic foot ulcer 10/29/2018    Unsteady gait     uses a wheelchair     Past Surgical History:   Procedure Laterality Date    AHMED GLAUCOMA IMPLANT Left 2011    DONE AT [facility]    AMPUTATION, TOE Left 12/5/2018    [facility] Mitch [facility] at [facility] OR    AMPUTATION, TOES  2-5 Left 11/16/2018    Performed by Mitch Chan MD at [facility] OR    BAERVELDT GLAUCOMA IMPLANT Left 2012    WITH CATARACT EXTRACTION//DONE AT [facility]    CARDIAC CATHETERIZATION Left 05/2016    CARDIAC DEFIBRILLATOR PLACEMENT Left 11/02/2018    CATARACT EXTRACTION W/  INTRAOCULAR LENS IMPLANT Left 2012    WITH BAERVEDT//DONE AT [facility]    CATARACT  EXTRACTION W/  INTRAOCULAR LENS IMPLANT Right 09/26/2018    COMPLEX ()    CB DESTRUCTION WITH CYCLO G6 Left 02/15/2017        CYST REMOVAL      DEBRIDEMENT, FOOT  12/28/2018    Performed by [facility] at [facility] OR    Exam under anesthesia, left foot debridement, left foot washout, possible left second through fifth metatarsal resection Left 12/28/2018    Performed by Mitch Chan MD at [facility] OR    Exam under anesthesia, left foot debridement, washout and all other indicated procedures Left 12/5/2018    Performed by Mitch Chan MD at [facility] OR    EXCISION, LESION, METATARSAL BONE  12/28/2018    Performed by Mitch Chan MD at [facility] OR    HEART CATH-LEFT N/A 5/6/2016    Performed by Mike Magana MD at [facility] CATH LAB    INCISION AND DRAINAGE Left 11/16/2018    Performed by Mitch Chan MD at [facility] OR    Incision and Drainage, left lower extremity debridement, washout Left 11/14/2018    Performed by Mitch Chan MD at [facility] OR    INSERTION, ICD GENERATOR, SINGLE CHAMBER N/A 11/2/2018    Performed by Pernell Greer MD at [facility] CATH LAB    INSERTION, IOL PROSTHESIS Right 9/26/2018    Performed by Perla Cortés MD at [facility] OR [facility]ST F[facility]    PHACOEMULSIFICATION, CATARACT Right 9/26/2018    Performed by Perla Cortés MD at [facility] OR [facility]ST [facility]    Right foot surgery  10/2014    TOE AMPUTATION Right     first and second    TONSILLECTOMY      TRABECULECTOMY/G 6 LASER Left 2/15/2017    Performed by Perla Cortés MD at [facility] OR [facility]ST [facility]     Family History   Problem Relation Age of Onset    Diabetes Mother     Heart disease Brother     No Known Problems Father     No Known Problems Sister     No Known Problems Maternal Aunt     No Known Problems Maternal Uncle     No Known Problems Paternal Aunt     No Known Problems Paternal Uncle     No Known Problems Maternal Grandmother     No Known Problems Maternal Grandfather      No Known Problems Paternal Grandmother     No Known Problems Paternal Grandfather     Anemia Neg Hx     Arrhythmia Neg Hx     Asthma Neg Hx     Clotting disorder Neg Hx     Fainting Neg Hx     Heart attack Neg Hx     Heart failure Neg Hx     Hyperlipidemia Neg Hx     Hypertension Neg Hx     Stroke Neg Hx     Atrial Septal Defect Neg Hx     Amblyopia Neg Hx     Blindness Neg Hx     Glaucoma Neg Hx     Macular degeneration Neg Hx     Retinal detachment Neg Hx     Strabismus Neg Hx      Social History     Socioeconomic History    Marital status: Legally      Spouse name: Not on file    Number of children: Not on file    Years of education: 14    Highest education level: Not on file   Social Needs    Financial resource strain: Not on file    Food insecurity - worry: Not on file    Food insecurity - inability: Not on file    Transportation needs - medical: Not on file    Transportation needs - non-medical: Not on file   Occupational History    Not on file   Tobacco Use    Smoking status: Former Smoker     Packs/day: 1.00     Years: 3.00     Pack years: 3.00     Types: Cigarettes     Last attempt to quit: 1984     Years since quittin.5    Smokeless tobacco: Never Used   Substance and Sexual Activity    Alcohol use: Yes     Alcohol/week: 0.6 oz     Types: 1 Cans of beer per week     Comment: Occasional    Drug use: No    Sexual activity: Not Currently   Other Topics Concern    Not on file   Social History Narrative    Not on file       Current Outpatient Medications:     allopurinol (ZYLOPRIM) 300 MG tablet, Take 1 tablet (300 mg total) by mouth once daily., Disp: 30 tablet, Rfl: 3    aspirin 81 MG Chew, Take 81 mg by mouth once daily., Disp: , Rfl:     brimonidine 0.1% (ALPHAGAN P) 0.1 % Drop, Place 1 drop into both eyes 2 (two) times daily., Disp: 3 mL, Rfl: 11    carvedilol (COREG) 3.125 MG tablet, Take 1 tablet (3.125 mg total) by mouth 2 (two) times daily.,  "Disp: 60 tablet, Rfl: 11    clopidogrel (PLAVIX) 75 mg tablet, Take 1 tablet (75 mg total) by mouth once daily., Disp: 30 tablet, Rfl: 11    coenzyme Q10 100 mg capsule, Take 100 mg by mouth every morning., Disp: , Rfl:     dorzolamide-timolol 2-0.5% (COSOPT) 22.3-6.8 mg/mL ophthalmic solution, Place 1 drop into both eyes 2 (two) times daily., Disp: 1 Bottle, Rfl: 4    ezetimibe (ZETIA) 10 mg tablet, Take 1 tablet (10 mg total) by mouth once daily., Disp: 30 tablet, Rfl: 2    fish oil-omega-3 fatty acids 300-1,000 mg capsule, Take 1 capsule by mouth 2 (two) times daily., Disp: 60 capsule, Rfl: 3    furosemide (LASIX) 40 MG tablet, Take 1.5 tablets (60 mg total) by mouth 2 (two) times daily., Disp: 90 tablet, Rfl: 11    insulin aspart U-100 (NOVOLOG) 100 unit/mL InPn pen, Use on premeal readings: 150-200=+2, 201-250=+4; 251-300=+6; 301-350=+8, over 350=+10 units, Disp: 2.7 mL, Rfl: 11    insulin glargine-lixisenatide (SOLIQUA 100/33) 100 unit-33 mcg/mL InPn, Inject 42 units every morning, Disp: 5 Syringe, Rfl: 5    metOLazone (ZAROXOLYN) 5 MG tablet, Take 1 tablet (5 mg total) by mouth every Monday and Thursday. 30 minutes before lasix., Disp: 30 tablet, Rfl: 2    miconazole NITRATE 2 % (MICOTIN) 2 % top powder, Apply topically 2 (two) times daily., Disp: , Rfl: 0    MULTIVIT,THER IRON,CA,FA & MIN (MULTIVITAMIN) Tab, Take 1 tablet by mouth every morning. , Disp: , Rfl:     oxyCODONE-acetaminophen (PERCOCET) 5-325 mg per tablet, Take 1 tablet by mouth every 6 (six) hours as needed for Pain., Disp: 45 tablet, Rfl: 0    pen needle, diabetic 31 gauge x 1/4" Ndle, Use as directed, Disp: 100 each, Rfl: 11    prednisoLONE acetate (PRED FORTE) 1 % DrpS, Place 1 drop into the right eye once daily., Disp: 1 mL, Rfl: 2    senna-docusate 8.6-50 mg (PERICOLACE) 8.6-50 mg per tablet, Take 1 tablet by mouth 2 (two) times daily., Disp: , Rfl:     sodium chloride 0.9% SolP 50 mL with DAPTOmycin 500 mg SolR 750 mg, " Inject 750 mg into the vein once daily. for 14 days, Disp: 14 Dose, Rfl: 1    sulfamethoxazole-trimethoprim 800-160mg (BACTRIM DS) 800-160 mg Tab, Take 1 tablet by mouth 2 (two) times daily. for 14 days, Disp: 28 tablet, Rfl: 0  No current facility-administered medications for this visit.     Facility-Administered Medications Ordered in Other Visits:     lactated ringers infusion, , Intravenous, Continuous, Tam Reynolds MD    lidocaine (PF) 10 mg/ml (1%) injection 10 mg, 1 mL, Intradermal, Once, Tam Reynolds MD    REVIEW OF SYSTEMS:  General: No fevers or chills; ENT: No sore throat; Allergy and Immunology: no persistent infections; Hematological and Lymphatic: No history of bleeding or easy bruising; Endocrine: negative; Respiratory: no cough, shortness of breath, or wheezing; Cardiovascular: no chest pain or dyspnea on exertion; Gastrointestinal: no abdominal pain/back, change in bowel habits, or bloody stools; Genito-Urinary: no dysuria, trouble voiding, or hematuria; Musculoskeletal: negative; Neurological: no TIA or stroke symptoms; Psychiatric: no nervousness, anxiety or depression.    PHYSICAL EXAM:                General appearance:  Alert, well-appearing, and in no distress.  Oriented to person, place, and time                    Neurological: Normal speech, no focal findings noted; CN II - XII grossly intact. RLE with sensation to light touch, LLE with sensation to light touch.            Musculoskeletal: Digits/nail without cyanosis/clubbing.  Strength 5/5 BLE.                    Neck: Supple, no significant adenopathy                  Chest:  Clear to auscultation, no wheezes, rales or rhonchi, symmetric air entry. No use of accessory muscles               Cardiac: Normal rate and regular rhythm, S1 and S2 normal            Abdomen: Soft, nontender, nondistended, no masses or organomegaly, no hernia     No rebound tenderness noted; bowel sounds normal     No groin adenopathy      Extremities:    2+ R femoral pulse, 2+ L femoral pulse     doppler+ R popliteal pulse, doppler+ L popliteal pulse     doppler+ R PT pulse, doppler+ L PT pulse     doppler+ R DP pulse, doppler+ L DP pulse     1+ RLE edema, 2+ LLE edema    Skin: LLE with healed superficial anterior lower leg wounds, minimal brawny edema, +large foot wound with fibrinous and improved granulation tissue, no odor, no purulence or erythema    LAB RESULTS:  No results found for: CBC  Lab Results   Component Value Date    LABPROT 11.2 12/27/2018    INR 1.1 12/27/2018     Lab Results   Component Value Date     (L) 01/30/2019    K 3.5 01/30/2019    CL 99 01/30/2019    CO2 27 01/30/2019     (H) 01/30/2019    BUN 57 (H) 01/30/2019    CREATININE 1.2 01/30/2019    CALCIUM 8.4 (L) 01/30/2019    ANIONGAP 9 01/30/2019    EGFRNONAA >60 01/30/2019     Lab Results   Component Value Date    WBC 7.31 01/30/2019    RBC 3.17 (L) 01/30/2019    HGB 8.6 (L) 01/30/2019    HCT 27.9 (L) 01/30/2019    MCV 88 01/30/2019    MCH 27.1 01/30/2019    MCHC 30.8 (L) 01/30/2019    RDW 17.2 (H) 01/30/2019     (H) 01/30/2019    MPV 8.9 (L) 01/30/2019    GRAN 5.7 01/30/2019    GRAN 78.6 (H) 01/30/2019    LYMPH 1.1 01/30/2019    LYMPH 15.5 (L) 01/30/2019    MONO 0.3 01/30/2019    MONO 4.1 01/30/2019    EOS 0.1 01/30/2019    BASO 0.04 01/30/2019    EOSINOPHIL 1.6 01/30/2019    BASOPHIL 0.5 01/30/2019    DIFFMETHOD Automated 01/30/2019     .  Lab Results   Component Value Date    HGBA1C 8.3 (H) 10/30/2018       IMAGING:  All pertinent imaging has been reviewed and interpreted independently.    BLE arterial US 1/2019: No focal stenosis    IMP/PLAN:  58 y.o. male with   Patient Active Problem List   Diagnosis    Epiretinal membrane, both eyes    Nuclear sclerotic cataract of right eye    Pseudophakia, left eye    Ptosis, left eyelid    DM type 2 with diabetic peripheral neuropathy    HLD (hyperlipidemia)    Neovascular glaucoma of both eyes    Tophaceous gout     Essential hypertension    CAD (coronary artery disease)    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 edema, with long-term current use of insulin    Neovascular glaucoma of left eye, severe stage    Statin myopathy    Neovascular glaucoma, right eye, mild stage    Left leg cellulitis    PVD (peripheral vascular disease)    Right wrist pain    Multiple open wounds of lower leg    Hepatosplenomegaly    Non-pressure chronic ulcer of other part of left foot with fat layer exposed    Type 2 diabetes mellitus with left diabetic foot ulcer    Open wound of left foot    Cellulitis of left lower extremity    Diabetic foot infection    Other ascites    Severe malnutrition    Goals of care, counseling/discussion    Alteration in skin integrity    MDR Acinetobacter baumannii infection    Takes dietary supplements    Intertriginous dermatitis associated with moisture    Debility    Infection due to multidrug-resistant Pseudomonas aeruginosa    Subacute osteomyelitis of left foot    being managed by PCP and specialists who is here today for evaluation of L foot wound.    -L foot wound slowly healing, multifactorial due to diabetes, PVD and venous insufficiency with improvement in granulation tissue although bacteria resistant to all safe treatment options - d/w pt and family re: risk of bacteremia and progression of infection with untreated Pseudomonas in wound/bone cultures despite improvement in perfusion and wound bed; amputation options are limited due to venous stasis changes to below left knee leg - Pt states he desires to continue Abx and local wound care; does not desire amputation at this time  -He would benefit from further debridement and washout with repeat cultures  -Spoke with Dr. Velez without safe alternative treatment options  -Recommend BLE Xeroform and dry kerlix wraps twice daily with elevation of LLE above level of the  heart  -Low sodium diet  -Strict glycemic control  -Cont Abx per ID recs  -Plan for L foot wound debridement 2/13/19 - cont local wound care to L foot wound    I spent 20 minutes evaluating this patient and greater than 50% of the time was spent counseling, coordinator care and discussing the plan of care.  All questions were answered and patient stated understanding with agreement with the above treatment plan.    Mitch Chan MD [facility]  Vascular and Endovascular Surgery

## 2019-01-31 NOTE — PLAN OF CARE
Problem: Adult Inpatient Plan of Care  Goal: Plan of Care Review  Outcome: Ongoing (interventions implemented as appropriate)  Arrived to unit in wheelchair. No complaints voiced. Blood collected from PICC line. Tolerated well. Pt will follow up with Dr. Davis. Pt discharged in wheelchair, accompanied by family.

## 2019-02-01 ENCOUNTER — LAB VISIT (OUTPATIENT)
Dept: LAB | Facility: HOSPITAL | Age: 59
End: 2019-02-01
Attending: FAMILY MEDICINE
Payer: COMMERCIAL

## 2019-02-01 ENCOUNTER — TELEPHONE (OUTPATIENT)
Dept: VASCULAR SURGERY | Facility: CLINIC | Age: 59
End: 2019-02-01

## 2019-02-01 DIAGNOSIS — M86.272 SUBACUTE OSTEOMYELITIS, LEFT ANKLE AND FOOT: Primary | ICD-10-CM

## 2019-02-01 LAB
ALBUMIN SERPL BCP-MCNC: 2.4 G/DL
ALP SERPL-CCNC: 130 U/L
ALT SERPL W/O P-5'-P-CCNC: 57 U/L
ANION GAP SERPL CALC-SCNC: 11 MMOL/L
AST SERPL-CCNC: 60 U/L
BASOPHILS # BLD AUTO: 0.03 K/UL
BASOPHILS NFR BLD: 0.4 %
BILIRUB SERPL-MCNC: 0.4 MG/DL
BUN SERPL-MCNC: 58 MG/DL
CALCIUM SERPL-MCNC: 8.3 MG/DL
CHLORIDE SERPL-SCNC: 96 MMOL/L
CK SERPL-CCNC: 384 U/L
CO2 SERPL-SCNC: 27 MMOL/L
CREAT SERPL-MCNC: 1.2 MG/DL
CRP SERPL-MCNC: 64.5 MG/L
DIFFERENTIAL METHOD: ABNORMAL
EOSINOPHIL # BLD AUTO: 0.1 K/UL
EOSINOPHIL NFR BLD: 1.9 %
ERYTHROCYTE [DISTWIDTH] IN BLOOD BY AUTOMATED COUNT: 17.2 %
EST. GFR  (AFRICAN AMERICAN): >60 ML/MIN/1.73 M^2
EST. GFR  (NON AFRICAN AMERICAN): >60 ML/MIN/1.73 M^2
GLUCOSE SERPL-MCNC: 117 MG/DL
HCT VFR BLD AUTO: 25.2 %
HGB BLD-MCNC: 7.9 G/DL
LYMPHOCYTES # BLD AUTO: 0.9 K/UL
LYMPHOCYTES NFR BLD: 12.5 %
MCH RBC QN AUTO: 27.4 PG
MCHC RBC AUTO-ENTMCNC: 31.3 G/DL
MCV RBC AUTO: 88 FL
MONOCYTES # BLD AUTO: 0.2 K/UL
MONOCYTES NFR BLD: 3.1 %
NEUTROPHILS # BLD AUTO: 6.2 K/UL
NEUTROPHILS NFR BLD: 82.1 %
PLATELET # BLD AUTO: 493 K/UL
PMV BLD AUTO: 9 FL
POTASSIUM SERPL-SCNC: 3.3 MMOL/L
PROT SERPL-MCNC: 7.1 G/DL
RBC # BLD AUTO: 2.88 M/UL
SODIUM SERPL-SCNC: 134 MMOL/L
WBC # BLD AUTO: 7.52 K/UL

## 2019-02-01 PROCEDURE — 82550 ASSAY OF CK (CPK): CPT

## 2019-02-01 PROCEDURE — 86140 C-REACTIVE PROTEIN: CPT

## 2019-02-01 PROCEDURE — 85025 COMPLETE CBC W/AUTO DIFF WBC: CPT

## 2019-02-01 PROCEDURE — 36415 COLL VENOUS BLD VENIPUNCTURE: CPT

## 2019-02-01 PROCEDURE — 80053 COMPREHEN METABOLIC PANEL: CPT

## 2019-02-01 NOTE — TELEPHONE ENCOUNTER
Call to Mr. Ray and gave him the date, time and location of his preop appointment. He stated understanding.

## 2019-02-05 ENCOUNTER — TELEPHONE (OUTPATIENT)
Dept: INFECTIOUS DISEASES | Facility: CLINIC | Age: 59
End: 2019-02-05

## 2019-02-05 ENCOUNTER — LAB VISIT (OUTPATIENT)
Dept: LAB | Facility: HOSPITAL | Age: 59
End: 2019-02-05
Attending: INTERNAL MEDICINE
Payer: COMMERCIAL

## 2019-02-05 ENCOUNTER — TELEPHONE (OUTPATIENT)
Dept: FAMILY MEDICINE | Facility: CLINIC | Age: 59
End: 2019-02-05

## 2019-02-05 DIAGNOSIS — E11.620 DIABETIC NECROBIOSIS LIPOIDICA: Primary | ICD-10-CM

## 2019-02-05 LAB
ALBUMIN SERPL BCP-MCNC: 2.3 G/DL
ALP SERPL-CCNC: 140 U/L
ALT SERPL W/O P-5'-P-CCNC: 53 U/L
ANION GAP SERPL CALC-SCNC: 10 MMOL/L
AST SERPL-CCNC: 47 U/L
BASOPHILS # BLD AUTO: 0.05 K/UL
BASOPHILS NFR BLD: 0.8 %
BILIRUB SERPL-MCNC: 0.3 MG/DL
BUN SERPL-MCNC: 65 MG/DL
CALCIUM SERPL-MCNC: 8.2 MG/DL
CHLORIDE SERPL-SCNC: 100 MMOL/L
CK SERPL-CCNC: 198 U/L
CO2 SERPL-SCNC: 25 MMOL/L
CREAT SERPL-MCNC: 1.9 MG/DL
CRP SERPL-MCNC: 38.8 MG/L
DIFFERENTIAL METHOD: ABNORMAL
EOSINOPHIL # BLD AUTO: 0.2 K/UL
EOSINOPHIL NFR BLD: 2.5 %
ERYTHROCYTE [DISTWIDTH] IN BLOOD BY AUTOMATED COUNT: 17.2 %
EST. GFR  (AFRICAN AMERICAN): 43 ML/MIN/1.73 M^2
EST. GFR  (NON AFRICAN AMERICAN): 37 ML/MIN/1.73 M^2
GLUCOSE SERPL-MCNC: 114 MG/DL
HCT VFR BLD AUTO: 28.9 %
HGB BLD-MCNC: 8.9 G/DL
LYMPHOCYTES # BLD AUTO: 0.6 K/UL
LYMPHOCYTES NFR BLD: 9.6 %
MCH RBC QN AUTO: 27.5 PG
MCHC RBC AUTO-ENTMCNC: 30.8 G/DL
MCV RBC AUTO: 89 FL
MONOCYTES # BLD AUTO: 0.5 K/UL
MONOCYTES NFR BLD: 7.9 %
NEUTROPHILS # BLD AUTO: 4.7 K/UL
NEUTROPHILS NFR BLD: 79.2 %
PLATELET # BLD AUTO: 360 K/UL
PMV BLD AUTO: 8.8 FL
POTASSIUM SERPL-SCNC: 4.2 MMOL/L
PROT SERPL-MCNC: 6.6 G/DL
RBC # BLD AUTO: 3.24 M/UL
SODIUM SERPL-SCNC: 135 MMOL/L
WBC # BLD AUTO: 5.95 K/UL

## 2019-02-05 PROCEDURE — 36415 COLL VENOUS BLD VENIPUNCTURE: CPT

## 2019-02-05 PROCEDURE — 85025 COMPLETE CBC W/AUTO DIFF WBC: CPT

## 2019-02-05 PROCEDURE — 86140 C-REACTIVE PROTEIN: CPT

## 2019-02-05 PROCEDURE — 80053 COMPREHEN METABOLIC PANEL: CPT

## 2019-02-05 PROCEDURE — 82550 ASSAY OF CK (CPK): CPT

## 2019-02-05 NOTE — TELEPHONE ENCOUNTER
----- Message from Delmis Thakur sent at 2/5/2019 10:48 AM CST -----  Contact: Chloé/ / [phone]/ [phone]  Chloé calling to request call back from staff. Says pt is retaining a lot of fluid in stomach. Please call to advise. Thank you.

## 2019-02-05 NOTE — TELEPHONE ENCOUNTER
----- Message from Aleksandra Mcclendon MA sent at 2/5/2019 11:20 AM CST -----  Contact: Matthew Garza from [employer] says pt end of care is 2/7/2019. There is no follow up appointment. Is the date as stands? Please advise

## 2019-02-06 ENCOUNTER — HOSPITAL ENCOUNTER (OUTPATIENT)
Dept: PREADMISSION TESTING | Facility: HOSPITAL | Age: 59
Discharge: HOME | End: 2019-02-06
Attending: SURGERY
Payer: COMMERCIAL

## 2019-02-06 ENCOUNTER — TELEPHONE (OUTPATIENT)
Dept: INFECTIOUS DISEASES | Facility: CLINIC | Age: 59
End: 2019-02-06

## 2019-02-06 ENCOUNTER — TELEPHONE (OUTPATIENT)
Dept: ENDOCRINOLOGY | Facility: CLINIC | Age: 59
End: 2019-02-06

## 2019-02-06 ENCOUNTER — TELEPHONE (OUTPATIENT)
Dept: CARDIOLOGY | Facility: CLINIC | Age: 59
End: 2019-02-06

## 2019-02-06 ENCOUNTER — ANESTHESIA EVENT (OUTPATIENT)
Dept: SURGERY | Facility: HOSPITAL | Age: 59
End: 2019-02-06
Payer: COMMERCIAL

## 2019-02-06 VITALS
HEART RATE: 71 BPM | RESPIRATION RATE: 18 BRPM | HEIGHT: 70 IN | WEIGHT: 205 LBS | BODY MASS INDEX: 29.35 KG/M2 | TEMPERATURE: 97 F | SYSTOLIC BLOOD PRESSURE: 101 MMHG | OXYGEN SATURATION: 97 % | DIASTOLIC BLOOD PRESSURE: 62 MMHG

## 2019-02-06 DIAGNOSIS — S91.302S OPEN WOUND OF LEFT FOOT, SEQUELA: ICD-10-CM

## 2019-02-06 LAB
ANION GAP SERPL CALC-SCNC: 8 MMOL/L
APTT BLDCRRT: 30.3 SEC
BASOPHILS # BLD AUTO: 0.03 K/UL
BASOPHILS NFR BLD: 0.4 %
BUN SERPL-MCNC: 61 MG/DL
CALCIUM SERPL-MCNC: 8.8 MG/DL
CHLORIDE SERPL-SCNC: 99 MMOL/L
CO2 SERPL-SCNC: 25 MMOL/L
CREAT SERPL-MCNC: 1.8 MG/DL
DIFFERENTIAL METHOD: ABNORMAL
EOSINOPHIL # BLD AUTO: 0.2 K/UL
EOSINOPHIL NFR BLD: 2.2 %
ERYTHROCYTE [DISTWIDTH] IN BLOOD BY AUTOMATED COUNT: 17.1 %
EST. GFR  (AFRICAN AMERICAN): 46 ML/MIN/1.73 M^2
EST. GFR  (NON AFRICAN AMERICAN): 40 ML/MIN/1.73 M^2
GLUCOSE SERPL-MCNC: 114 MG/DL
HCT VFR BLD AUTO: 30.4 %
HGB BLD-MCNC: 9.4 G/DL
INR PPP: 1.1
LYMPHOCYTES # BLD AUTO: 0.5 K/UL
LYMPHOCYTES NFR BLD: 7 %
MAGNESIUM SERPL-MCNC: 2.4 MG/DL
MCH RBC QN AUTO: 27.2 PG
MCHC RBC AUTO-ENTMCNC: 30.9 G/DL
MCV RBC AUTO: 88 FL
MONOCYTES # BLD AUTO: 0.6 K/UL
MONOCYTES NFR BLD: 8.5 %
NEUTROPHILS # BLD AUTO: 5.6 K/UL
NEUTROPHILS NFR BLD: 81.9 %
PHOSPHATE SERPL-MCNC: 4.6 MG/DL
PLATELET # BLD AUTO: 355 K/UL
PMV BLD AUTO: 8.9 FL
POTASSIUM SERPL-SCNC: 3.5 MMOL/L
PROTHROMBIN TIME: 11.1 SEC
RBC # BLD AUTO: 3.46 M/UL
SODIUM SERPL-SCNC: 132 MMOL/L
WBC # BLD AUTO: 6.82 K/UL

## 2019-02-06 PROCEDURE — 36415 COLL VENOUS BLD VENIPUNCTURE: CPT

## 2019-02-06 PROCEDURE — 85730 THROMBOPLASTIN TIME PARTIAL: CPT

## 2019-02-06 PROCEDURE — 83735 ASSAY OF MAGNESIUM: CPT

## 2019-02-06 PROCEDURE — 80048 BASIC METABOLIC PNL TOTAL CA: CPT

## 2019-02-06 PROCEDURE — 84100 ASSAY OF PHOSPHORUS: CPT

## 2019-02-06 PROCEDURE — 93010 ELECTROCARDIOGRAM REPORT: CPT | Mod: ,,, | Performed by: INTERNAL MEDICINE

## 2019-02-06 PROCEDURE — 85025 COMPLETE CBC W/AUTO DIFF WBC: CPT

## 2019-02-06 PROCEDURE — 85610 PROTHROMBIN TIME: CPT

## 2019-02-06 PROCEDURE — 93005 ELECTROCARDIOGRAM TRACING: CPT

## 2019-02-06 PROCEDURE — 93010 EKG 12-LEAD: ICD-10-PCS | Mod: ,,, | Performed by: INTERNAL MEDICINE

## 2019-02-06 NOTE — TELEPHONE ENCOUNTER
Spoke with aurea and Ping at [employer]  and notified them the py is to stop his abx on 2/7/2019

## 2019-02-06 NOTE — TELEPHONE ENCOUNTER
Appears that insurance prefers Xultophy instead of Soliqua.     rx for Xultophy sent instead.     If covered, please let pt know that he can substitute the same amount of Xultophy for Soliqua. Call with any issues.   He can also find savings card online for xultophy.

## 2019-02-06 NOTE — TELEPHONE ENCOUNTER
Called patient and informed them per providers note below. Patient stated that he is taking 34 units instead of 38 units due to diet change. They will drop off BG log next week and  coupon for Xultophy.

## 2019-02-06 NOTE — PROGRESS NOTES
Assessment /Plan     For exam results, see Encounter Report.    Neovascular glaucoma of left eye, severe stage    Neovascular glaucoma, right eye, mild stage    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 edema, with long-term current use of insulin    Epiretinal membrane, both eyes    Diabetic macular edema of right eye with proliferative retinopathy associated with type 2 diabetes mellitus    Diabetic macular edema of left eye with proliferative retinopathy associated with type 2 diabetes mellitus    Pseudophakia    History of glaucoma tube shunt procedure    Rubeosis iridis, right        Pt has been followed in the retina and glaucoma clinics at [facility] for many years  Now is transferring to [facility] main campus (2016)   S/P PRP ou for PDR ou   S/P multiple avastin injections ou  + NVG os // ? POAG od   S/P ahmed os 2011  S/P phaco/IOL / baerveldt os 2012       1. NVG OS 2/2 PDR // ?? POAG od - on gtts    First HVF   ? 10/2014    First photos   6/2016   Treatment / Drops started   Years ago           Family history    ?        Glaucoma meds    cosopt ou / alphagan ou         H/O adverse rxn to glaucoma drops    ? Try and avoid PG's - PDR with ME and NVG os         LASERS    SLT - ? Os or OU // G6 laser os // PRP ou         GLAUCOMA SURGERIES    ahmed os // baerveldt os - both at [facility] //         OTHER EYE SURGERIES    Phaco/IOL od 9/26/2018         CDR    0.4/0.9        Tbase    ??  (on gtts at [facility] from 2014 to 2016  ran - 12-19 od // 16-25 os )         Tmax    ?             Ttarget    ?             HVF    4 test 2014 to  2015 - [facility] 2 test 2016 to 2018  - SAD/IAD od // gen dep - SAD / IAD os         Gonio    +3 od // +3 sup os with PAS T/N/I         CCT    449/540        OCT    2 test 2016 to 2018 - RNFL - dec S/I od  od // dec S/I, bord N  os        HRT    2 test 2017 to 2018 - MR - nl  od // dec TI/ bord NI  os         Disc photos    2016     - Ttoday    15/15  - Test done today    F/U phaco/IOL od // rubeosis check - improved od post more avastin and fill in PRP      - OFF -  Xalatan OD due to macular edema  - pt reports good adherence  Cont  cosopt bid ou, brimonidine bid ou        2.  PDR s/p PRP OU (DM2), with CSME OU (and HTN)  - Encouraged good BS/BP/Cholesterol control    3. DME OU  OD>OS  S/p Avastin OD x 9, OS x 7  Will monitor OS for now given NVG and ERM      4. PSK OS  - Stable, CTM, RD precautions       5. ERM OS  Given NVG hx, monitor  - CTM    6. PC IOL OU    OD 9/26/2018 - pcb00 17.0   Os       S/P GDD x 2 os - the ST one is retracting out of cornea - just barely in - still functioning - lrg bleb over plate   S/P G6 CB destruction - OS - 2/16/2017 // pre-op IOP was 22     Cont cosopt ou tid   Cont alphagan tid ou   Stay off  latanoprost - - 2/2 DME od       Phaco / IOL OD Date: 9/26/2018 - PCB00 17.0   Developed -  NEW RUBEOSIS AT THE PUPIL OD AND 1 SMALL AREA OF ANGLE VIRI ING OD    S/P more avastin od 10/9/2018 - [facility] - rubeosis regressed    S/p fill in PRP od - 11/26/2018 - [facility] - rubeosis regressed    S/p MORE AVASTIN OD 1/25/2019     VA good - BCVA - 20/40 - 11/30/2018    Cont cosopt Bid ou   Cont BRIMONIDINE  bid ou     If IOP goes up OS  can re-start PG's os and / or repeat cyclo G6    KEEP F/U WITH MAZZULLA FOR ? MORE AVASTIN Q 3 MONTHS OD   F/U ME 3 MONTHS WITH HVF 24-2 SS OD AND 24-2 STIM V OS // DFE / PHOTOS     I have seen and personally examined the patient.  I agree with the findings, assessment and plan of the resident and/or fellow.     Perla Cortés MD

## 2019-02-06 NOTE — DISCHARGE INSTRUCTIONS
"Your procedure  is scheduled for __2/13/2019________.    Call [phone] between 2pm and 5pm on _2/12/2019______to find out your arrival time for the day of surgery.    Report to Same Day Surgery Unit at ____ AM on the 2nd floor of the hospital.  Use the front entrance of the hospital.  The front doors of the hospital open promptly at 5:30am.  If you need wheelchair assistance, call [phone] from your cell phone, or call "0" from the courtesy phone in the lobby.    Important instructions:   Do not eat or drink after 12 midnight, including water.  It is okay to brush your teeth.  Do not have gum, candy or mints.     Take only these medications with a small swallow of water on the morning of your surgery __carvedilol, eye drops____________    Do not take any diabetic medication on the morning of surgery unless instructed to do so by your doctor or pre op nurse.    Stop taking Aspirin, Ibuprofen, Motrin and Aleve , Fish oil, and Vitamin E for at least 7 days before your surgery. You may use Tylenol unless otherwise instructed by your doctor.         Please shower the night before and the morning of your surgery.          You may wear deodorant only.      Do not wear powder, body lotion or perfume/cologne.     Do not wear any jewelry or have any metal on your body.     You will be asked to remove any dentures or partials for the procedure.     Please bring any documents given to you by your doctor.     If you are going home on the same day of surgery, you must arrange for a family member or a friend to drive you home.  Public transportation is prohibited.  You will not be able to drive home if you were given anesthesia or sedation.     Wear loose fitting clothes allowing for bandages.     Please leave money and valuables home.       You may bring your cell phone.     Call the doctor if fever or illness should occur before your surgery.    Call [phone] to contact us here if needed.  "

## 2019-02-06 NOTE — TELEPHONE ENCOUNTER
Okay.  Then let's stop the IV antibiotic at the previously planned date of February 7.  He is to continue taking the pill antibiotic called bactrim that I prescribed for him at his last visit.  Arrange follow up after the surgery.

## 2019-02-06 NOTE — PRE-PROCEDURE INSTRUCTIONS
Dr Montes requires cardiac clearance.  Dr Chan notified.  Dr Greer's office called to see if pt can see him today.  Waiting for call back.

## 2019-02-06 NOTE — TELEPHONE ENCOUNTER
Spoke to mellissa ng clearance to me will return in AM     ef        ----- Message from Delmis Chung sent at 2/6/2019 12:30 PM CST -----  Contact: mellissa with pre op [phone]  Name of Who is Calling: mellissa      What is the request in detail: needs cardiac clearance for surgery. Can he come over right now? He is next door. Call malloy      Can the clinic reply by [facility]: no      What Number to Call Back if not in [facility]: mellissa with pre op [phone]

## 2019-02-06 NOTE — TELEPHONE ENCOUNTER
----- Message from Dixon West MD sent at 2/6/2019  2:24 PM CST -----  I am just seeing his labs. Looks like his kidneys are not tolerating the bactrim.  Ask him to stop taking the bactrim.

## 2019-02-08 ENCOUNTER — OFFICE VISIT (OUTPATIENT)
Dept: OPHTHALMOLOGY | Facility: CLINIC | Age: 59
End: 2019-02-08
Payer: COMMERCIAL

## 2019-02-08 DIAGNOSIS — Z96.89 HISTORY OF GLAUCOMA TUBE SHUNT PROCEDURE: ICD-10-CM

## 2019-02-08 DIAGNOSIS — Z96.1 PSEUDOPHAKIA: ICD-10-CM

## 2019-02-08 DIAGNOSIS — E11.3511 DIABETIC MACULAR EDEMA OF RIGHT EYE WITH PROLIFERATIVE RETINOPATHY ASSOCIATED WITH TYPE 2 DIABETES MELLITUS: ICD-10-CM

## 2019-02-08 DIAGNOSIS — H40.51X1 NEOVASCULAR GLAUCOMA, RIGHT EYE, MILD STAGE: ICD-10-CM

## 2019-02-08 DIAGNOSIS — H40.52X3 NEOVASCULAR GLAUCOMA OF LEFT EYE, SEVERE STAGE: Primary | ICD-10-CM

## 2019-02-08 DIAGNOSIS — E11.3512 DIABETIC MACULAR EDEMA OF LEFT EYE WITH PROLIFERATIVE RETINOPATHY ASSOCIATED WITH TYPE 2 DIABETES MELLITUS: ICD-10-CM

## 2019-02-08 DIAGNOSIS — H35.373 EPIRETINAL MEMBRANE, BOTH EYES: ICD-10-CM

## 2019-02-08 DIAGNOSIS — H21.1X1 RUBEOSIS IRIDIS, RIGHT: ICD-10-CM

## 2019-02-08 PROCEDURE — 92012 INTRM OPH EXAM EST PATIENT: CPT | Mod: S$GLB,,, | Performed by: OPHTHALMOLOGY

## 2019-02-08 PROCEDURE — 99999 PR PBB SHADOW E&M-EST. PATIENT-LVL II: ICD-10-PCS | Mod: PBBFAC,,, | Performed by: OPHTHALMOLOGY

## 2019-02-08 PROCEDURE — 92020 PR SPECIAL EYE EVAL,GONIOSCOPY: ICD-10-PCS | Mod: S$GLB,,, | Performed by: OPHTHALMOLOGY

## 2019-02-08 PROCEDURE — 99999 PR PBB SHADOW E&M-EST. PATIENT-LVL II: CPT | Mod: PBBFAC,,, | Performed by: OPHTHALMOLOGY

## 2019-02-08 PROCEDURE — 92012 PR EYE EXAM, EST PATIENT,INTERMED: ICD-10-PCS | Mod: S$GLB,,, | Performed by: OPHTHALMOLOGY

## 2019-02-08 PROCEDURE — 92020 GONIOSCOPY: CPT | Mod: S$GLB,,, | Performed by: OPHTHALMOLOGY

## 2019-02-08 RX ORDER — DORZOLAMIDE HYDROCHLORIDE AND TIMOLOL MALEATE 20; 5 MG/ML; MG/ML
1 SOLUTION/ DROPS OPHTHALMIC 2 TIMES DAILY
Qty: 1 BOTTLE | Refills: 11 | Status: SHIPPED | OUTPATIENT
Start: 2019-02-08 | End: 2019-12-12 | Stop reason: SDUPTHER

## 2019-02-08 RX ORDER — BRIMONIDINE TARTRATE 2 MG/ML
1 SOLUTION/ DROPS OPHTHALMIC 2 TIMES DAILY
Qty: 10 ML | Refills: 11 | Status: SHIPPED | OUTPATIENT
Start: 2019-02-08 | End: 2019-12-12 | Stop reason: SDUPTHER

## 2019-02-11 ENCOUNTER — HOSPITAL ENCOUNTER (OUTPATIENT)
Facility: HOSPITAL | Age: 59
Discharge: HOME | End: 2019-02-11
Attending: RADIOLOGY | Admitting: RADIOLOGY
Payer: COMMERCIAL

## 2019-02-11 VITALS
SYSTOLIC BLOOD PRESSURE: 144 MMHG | TEMPERATURE: 98 F | HEART RATE: 75 BPM | OXYGEN SATURATION: 97 % | DIASTOLIC BLOOD PRESSURE: 87 MMHG | RESPIRATION RATE: 18 BRPM

## 2019-02-11 DIAGNOSIS — R18.8 ASCITES: ICD-10-CM

## 2019-02-11 PROCEDURE — P9047 ALBUMIN (HUMAN), 25%, 50ML: HCPCS | Mod: JG | Performed by: RADIOLOGY

## 2019-02-11 PROCEDURE — 63600175 PHARM REV CODE 636 W HCPCS: Mod: JG | Performed by: RADIOLOGY

## 2019-02-11 RX ORDER — SODIUM CHLORIDE 9 MG/ML
INJECTION, SOLUTION INTRAVENOUS CONTINUOUS
Status: DISCONTINUED | OUTPATIENT
Start: 2019-02-11 | End: 2019-02-11 | Stop reason: HOSPADM

## 2019-02-11 RX ORDER — LIDOCAINE HYDROCHLORIDE 10 MG/ML
1 INJECTION, SOLUTION EPIDURAL; INFILTRATION; INTRACAUDAL; PERINEURAL ONCE
Status: DISCONTINUED | OUTPATIENT
Start: 2019-02-11 | End: 2019-02-11 | Stop reason: HOSPADM

## 2019-02-11 RX ORDER — ALBUMIN HUMAN 250 G/1000ML
75 SOLUTION INTRAVENOUS ONCE
Status: COMPLETED | OUTPATIENT
Start: 2019-02-11 | End: 2019-02-11

## 2019-02-11 RX ADMIN — ALBUMIN HUMAN 75 G: 0.25 SOLUTION INTRAVENOUS at 03:02

## 2019-02-11 NOTE — H&P
Radiology History & Physical      SUBJECTIVE:     Chief Complaint: ascites    History of Present Illness:  Marvin Ray is a 58 y.o. male who presents for paracentesis.  High volumn 10 L removed in past.  Past Medical History:   Diagnosis Date    Arthritis     Cellulitis     CKD (chronic kidney disease), stage III     Coronary artery disease     Diabetes mellitus     Diabetic retinopathy     Diabetic ulcer of left foot     Glaucoma     Gout     Hyperlipemia     Hypertension     ICD (implantable cardioverter-defibrillator) in place 11/02/2018    Left chest    Non-pressure chronic ulcer of other part of left foot with fat layer exposed 10/23/2018    PVD (peripheral vascular disease)     Type 2 diabetes mellitus with left diabetic foot ulcer 10/29/2018    Unsteady gait     uses a wheelchair     Past Surgical History:   Procedure Laterality Date    AHMED GLAUCOMA IMPLANT Left 2011    DONE AT [facility]    AMPUTATION, TOE Left 12/5/2018    [facility] OR    AMPUTATION, TOES  2-5 Left 11/16/2018    [facility] at [facility] OR    BAERVELDT GLAUCOMA IMPLANT Left 2012    WITH CATARACT EXTRACTION//DONE AT [facility]    CARDIAC CATHETERIZATION Left 05/2016    CARDIAC DEFIBRILLATOR PLACEMENT Left 11/02/2018    CATARACT EXTRACTION W/  INTRAOCULAR LENS IMPLANT Left 2012    WITH BAERVEDT//DONE AT [facility]    CATARACT EXTRACTION W/  INTRAOCULAR LENS IMPLANT Right 09/26/2018    COMPLEX ()    CB DESTRUCTION WITH CYCLO G6 Left 02/15/2017        CYST REMOVAL      DEBRIDEMENT, FOOT  12/28/2018    [facility] at [facility] OR    Exam under anesthesia, left foot debridement, left foot washout, possible left second through fifth metatarsal resection Left 12/28/2018    [facility] by [facility] at [facility] OR    Exam under anesthesia, left foot debridement, washout and all other indicated procedures Left  12/5/2018    [facility] at [facility] OR    EXCISION, LESION, METATARSAL BONE  12/28/2018    Performed [facility] at [facility] OR    HEART CATH-LEFT N/A 5/6/2016    Performed by Mike Magana [facility] at [facility] CATH LAB    INCISION AND DRAINAGE Left 11/16/2018    Performed [facility] at [facility] OR    Incision and Drainage, left lower extremity debridement, washout Left 11/14/2018    Performed by [facility] at [facility] OR    INSERTION, ICD GENERATOR, SINGLE CHAMBER N/A 11/2/2018    Performed by Pernell Greer MD at [facility] CATH LAB    INSERTION, IOL PROSTHESIS Right 9/26/2018    Performed by Perla Cortés MD at [facility] OR 1ST FLR    PHACOEMULSIFICATION, CATARACT Right 9/26/2018    Performed by Perla Cortés MD at [facility] OR 1ST FLR    Right foot surgery  10/2014    TOE AMPUTATION Right     first and second    TONSILLECTOMY      TRABECULECTOMY/G 6 LASER Left 2/15/2017    Performed by Perla Cortés MD at [facility] OR 1ST FLR       Home Meds:   Prior to Admission medications    Medication Sig Start Date End Date Taking? Authorizing Provider   allopurinol (ZYLOPRIM) 300 MG tablet Take 1 tablet (300 mg total) by mouth once daily. 1/23/19   Sara Ching MD   aspirin 81 MG Chew Take 81 mg by mouth once daily.    Historical Provider, MD   brimonidine 0.2% (ALPHAGAN) 0.2 % Drop Place 1 drop into both eyes 2 (two) times daily. 2/8/19   Perla Cortés MD   carvedilol (COREG) 3.125 MG tablet Take 1 tablet (3.125 mg total) by mouth 2 (two) times daily. 1/23/19 1/23/20  Sara Ching MD   coenzyme Q10 100 mg capsule Take 100 mg by mouth every morning.    Historical Provider, MD   dorzolamide-timolol 2-0.5% (COSOPT) 22.3-6.8 mg/mL ophthalmic solution Place 1 drop into both eyes 2 (two) times daily. 2/8/19 2/8/20  Perla Cortés MD   ezetimibe (ZETIA) 10 mg tablet Take 1 tablet (10 mg total) by mouth once daily. 9/7/18 9/7/19  Sheryl Madison NP  "  fish oil-omega-3 fatty acids 300-1,000 mg capsule Take 1 capsule by mouth 2 (two) times daily. 1/23/19   Sara Ching MD   furosemide (LASIX) 40 MG tablet Take 1.5 tablets (60 mg total) by mouth 2 (two) times daily. 1/23/19 1/23/20  Sara Ching MD   insulin aspart U-100 (NOVOLOG) 100 unit/mL InPn pen Use on premeal readings: 150-200=+2, 201-250=+4; 251-300=+6; 301-350=+8, over 350=+10 units 1/31/19   Sheryl Madison NP   insulin degludec-liraglutide (XULTOPHY 100/3.6) 100 unit-3.6 mg /mL (3 mL) InPn Inject 42 Units into the skin once daily. 2/6/19   Sheryl Madison NP   metOLazone (ZAROXOLYN) 5 MG tablet Take 1 tablet (5 mg total) by mouth every Monday and Thursday. 30 minutes before lasix. 1/24/19   Sara Ching MD   miconazole NITRATE 2 % (MICOTIN) 2 % top powder Apply topically 2 (two) times daily. 12/19/18   Kirsten Trinh MD   MULTIVIT,THER IRON,CA,FA & MIN (MULTIVITAMIN) Tab Take 1 tablet by mouth every morning.     Aisha Keyes MD   oxyCODONE-acetaminophen (PERCOCET) 5-325 mg per tablet Take 1 tablet by mouth every 6 (six) hours as needed for Pain. 1/24/19   Keyana Haider, NP   pen needle, diabetic 31 gauge x 1/4" Ndle Use as directed 8/11/16   iMke Bass MD   senna-docusate 8.6-50 mg (PERICOLACE) 8.6-50 mg per tablet Take 1 tablet by mouth 2 (two) times daily. 12/19/18   Kirsten Trinh MD   sulfamethoxazole-trimethoprim 800-160mg (BACTRIM DS) 800-160 mg Tab Take 1 tablet by mouth 2 (two) times daily. for 14 days 1/29/19 2/12/19  iDxon West MD     Anticoagulants/Antiplatelets: no anticoagulation    Allergies:   Review of patient's allergies indicates:   Allergen Reactions    Statins-hmg-coa reductase inhibitors      Generalized Pain    Onglyza [saxagliptin]     Penicillins Rash     Sedation History:  no adverse reactions    Review of Systems:   Hematological: no known coagulopathies  Respiratory: no shortness of breath  Cardiovascular: no chest " pain  Gastrointestinal: positive for - gas/bloating  no abdominal pain  Genito-Urinary: no dysuria  Musculoskeletal: negative  Neurological: no TIA or stroke symptoms         OBJECTIVE:     Vital Signs (Most Recent)  BP: 134/73 (02/11/19 1448)    Physical Exam:  ASA: 2  Mallampati: 3    General: no acute distress  Mental Status: alert and oriented to person, place and time  HEENT: normocephalic, atraumatic  Chest: unlabored breathing  Heart: regular heart rate  Abdomen: nondistended  Extremity: moves all extremities    Laboratory  Lab Results   Component Value Date    INR 1.1 02/06/2019       Lab Results   Component Value Date    WBC 6.82 02/06/2019    HGB 9.4 (L) 02/06/2019    HCT 30.4 (L) 02/06/2019    MCV 88 02/06/2019     (H) 02/06/2019      Lab Results   Component Value Date     (H) 02/06/2019     (L) 02/06/2019    K 3.5 02/06/2019    CL 99 02/06/2019    CO2 25 02/06/2019    BUN 61 (H) 02/06/2019    CREATININE 1.8 (H) 02/06/2019    CALCIUM 8.8 02/06/2019    MG 2.4 02/06/2019    ALT 53 (H) 02/05/2019    AST 47 (H) 02/05/2019    ALBUMIN 2.3 (L) 02/05/2019    BILITOT 0.3 02/05/2019       ASSESSMENT/PLAN:     Sedation Plan: local  Patient will undergo paracentesis.    Jenna Mackenzie MD  Staff Radiologist  Department of Radiology  Pager: [phone]

## 2019-02-11 NOTE — DISCHARGE SUMMARY
Radiology Post-Procedure Note    Pre Op Diagnosis: Ascites  Post Op Diagnosis: Same    Procedure: Paracentesis    Procedure performed by: Jenna Mackenzie MD    Written Informed Consent Obtained: Yes  Specimen Removed: NO  Estimated Blood Loss: Minimal    Findings:   Successful paracentesis.  Albumin administered PRN per protocol.    Patient tolerated procedure well.    Jenna Mackenzie MD  Staff Radiologist  Department of Radiology  Pager: [phone]

## 2019-02-11 NOTE — NURSING
Pt tolerated paracentesis with Dr. Mackenzie. VSS. No s/s of distress noted. Report given to Nely Mccabe. 11.1 liters collected.

## 2019-02-11 NOTE — DISCHARGE INSTRUCTIONS
BATHING:  ? You may shower tomorrow.      DRESSING:  ? Remove dressing tomorrow.        ACTIVITY LEVEL: If you have received sedation or an anesthetic, you may feel sleepy for several hours. Rest until you are more awake. Gradually resume your normal activities    Do not drive, drink alcohol, or sign legal documents for 24 hours, or if taking narcotic pain medication.      DIET: You may resume your home diet. If nausea is present, increase your diet gradually with fluids and bland foods.    Medications: Pain medication should be taken only if needed and as directed. If antibiotics are prescribed, the medication should be taken until completed. You will be given an updated list of you medications.    ? No driving, alcoholic beverages or signing legal documents for next 24 hours if you have had sedation, or while taking pain medication    CALL THE DOCTOR:     For any obvious bleeding (some dried blood over the incision is normal).     Redness, swelling, foul smell around incision or fever over 101.  Shortness of breath.  Persistent pain or nausea not relieved by medication.      Call  [phone]     to speak with an Interventional Radiologist    If any unusual problems or difficulties occur contact your doctor. If you cannot contact your doctor but feel your signs and symptoms warrant a physicians attention return to the emergency room.     Fall Prevention  Millions of people fall every year and injure themselves. You may have had anesthesia or sedation which may increase your risk of falling. You may have health issues that put you at an increased risk of falling.     Here are ways to reduce your risk of falling.  ·   · Make your home safe by keeping walkways clear of objects you may trip over.  · Use non-slip pads under rugs. Do not use area rugs or small throw rugs.  · Use non-slip mats in bathtubs and showers.  · Install handrails and lights on staircases.  · Do not walk in poorly lit areas.  · Do not stand on  chairs or wobbly ladders.  · Use caution when reaching overhead or looking upward. This position can cause a loss of balance.  · Be sure your shoes fit properly, have non-slip bottoms and are in good condition.   · Wear shoes both inside and out. Avoid going barefoot or wearing slippers.  · Be cautious when going up and down stairs, curbs, and when walking on uneven sidewalks.  · If your balance is poor, consider using a cane or walker.  · If your fall was related to alcohol use, stop or limit alcohol intake.   · If your fall was related to use of sleeping medicines, talk to your doctor about this. You may need to reduce your dosage at bedtime if you awaken during the night to go to the bathroom.    · To reduce the need for nighttime bathroom trips:  ¨ Avoid drinking fluids for several hours before going to bed  ¨ Empty your bladder before going to bed  ¨ Men can keep a urinal at the bedside  · Stay as active as you can. Balance, flexibility, strength, and endurance all come from exercise. They all play a role in preventing falls. Ask your healthcare provider which types of activity are right for you.  · Get your vision checked on a regular basis.  · If you have pets, know where they are before you stand up or walk so you don't trip over them.  · Use night lights.

## 2019-02-13 ENCOUNTER — HOSPITAL ENCOUNTER (OUTPATIENT)
Facility: HOSPITAL | Age: 59
Discharge: HOME | End: 2019-02-13
Attending: SURGERY | Admitting: SURGERY
Payer: COMMERCIAL

## 2019-02-13 ENCOUNTER — ANESTHESIA (OUTPATIENT)
Dept: SURGERY | Facility: HOSPITAL | Age: 59
End: 2019-02-13
Payer: COMMERCIAL

## 2019-02-13 VITALS
WEIGHT: 205 LBS | HEART RATE: 77 BPM | RESPIRATION RATE: 16 BRPM | BODY MASS INDEX: 29.35 KG/M2 | TEMPERATURE: 98 F | DIASTOLIC BLOOD PRESSURE: 63 MMHG | HEIGHT: 70 IN | OXYGEN SATURATION: 97 % | SYSTOLIC BLOOD PRESSURE: 110 MMHG

## 2019-02-13 DIAGNOSIS — S91.302D OPEN WOUND OF LEFT FOOT, SUBSEQUENT ENCOUNTER: ICD-10-CM

## 2019-02-13 DIAGNOSIS — S91.302S OPEN WOUND OF LEFT FOOT, SEQUELA: Primary | ICD-10-CM

## 2019-02-13 LAB
GRAM STN SPEC: NORMAL

## 2019-02-13 PROCEDURE — 87186 SC STD MICRODIL/AGAR DIL: CPT

## 2019-02-13 PROCEDURE — 88305 TISSUE EXAM BY PATHOLOGIST: CPT | Performed by: PATHOLOGY

## 2019-02-13 PROCEDURE — 87116 MYCOBACTERIA CULTURE: CPT

## 2019-02-13 PROCEDURE — 36000704 HC OR TIME LEV I 1ST 15 MIN: Performed by: SURGERY

## 2019-02-13 PROCEDURE — 87102 FUNGUS ISOLATION CULTURE: CPT | Mod: 59

## 2019-02-13 PROCEDURE — 82962 GLUCOSE BLOOD TEST: CPT | Performed by: SURGERY

## 2019-02-13 PROCEDURE — 71000015 HC POSTOP RECOV 1ST HR: Performed by: SURGERY

## 2019-02-13 PROCEDURE — 11047 DBRDMT BONE EACH ADDL: CPT | Mod: ,,, | Performed by: SURGERY

## 2019-02-13 PROCEDURE — 88311 TISSUE SPECIMEN TO PATHOLOGY - SURGERY: ICD-10-PCS | Mod: 26,,, | Performed by: PATHOLOGY

## 2019-02-13 PROCEDURE — 87075 CULTR BACTERIA EXCEPT BLOOD: CPT | Mod: 59

## 2019-02-13 PROCEDURE — 37000009 HC ANESTHESIA EA ADD 15 MINS: Performed by: SURGERY

## 2019-02-13 PROCEDURE — 87077 CULTURE AEROBIC IDENTIFY: CPT

## 2019-02-13 PROCEDURE — 37000008 HC ANESTHESIA 1ST 15 MINUTES: Performed by: SURGERY

## 2019-02-13 PROCEDURE — D9220A PRA ANESTHESIA: Mod: CRNA,,, | Performed by: NURSE ANESTHETIST, CERTIFIED REGISTERED

## 2019-02-13 PROCEDURE — 87206 SMEAR FLUORESCENT/ACID STAI: CPT | Mod: 91

## 2019-02-13 PROCEDURE — 87070 CULTURE OTHR SPECIMN AEROBIC: CPT | Mod: 59

## 2019-02-13 PROCEDURE — 87205 SMEAR GRAM STAIN: CPT

## 2019-02-13 PROCEDURE — 11047: ICD-10-PCS | Mod: ,,, | Performed by: SURGERY

## 2019-02-13 PROCEDURE — 63600175 PHARM REV CODE 636 W HCPCS: Performed by: NURSE ANESTHETIST, CERTIFIED REGISTERED

## 2019-02-13 PROCEDURE — S0077 INJECTION, CLINDAMYCIN PHOSP: HCPCS | Performed by: SURGERY

## 2019-02-13 PROCEDURE — D9220A PRA ANESTHESIA: ICD-10-PCS | Mod: ANES,,, | Performed by: ANESTHESIOLOGY

## 2019-02-13 PROCEDURE — 25000003 PHARM REV CODE 250: Performed by: SURGERY

## 2019-02-13 PROCEDURE — D9220A PRA ANESTHESIA: Mod: ANES,,, | Performed by: ANESTHESIOLOGY

## 2019-02-13 PROCEDURE — 88305 TISSUE EXAM BY PATHOLOGIST: CPT | Mod: 26,,, | Performed by: PATHOLOGY

## 2019-02-13 PROCEDURE — 11044 DBRDMT BONE 1ST 20 SQ CM/<: CPT | Mod: ,,, | Performed by: SURGERY

## 2019-02-13 PROCEDURE — 88311 DECALCIFY TISSUE: CPT | Mod: 26,,, | Performed by: PATHOLOGY

## 2019-02-13 PROCEDURE — D9220A PRA ANESTHESIA: ICD-10-PCS | Mod: CRNA,,, | Performed by: NURSE ANESTHETIST, CERTIFIED REGISTERED

## 2019-02-13 PROCEDURE — 11044 PR DEBRIDEMENT, SKIN, SUB-Q TISSUE,MUSCLE,BONE,=<20 SQ CM: ICD-10-PCS | Mod: ,,, | Performed by: SURGERY

## 2019-02-13 PROCEDURE — 97606 PR NEG PRESS WOUND THERAPY (NPWT) W/NON-DISPOSABLE WOUND VAC DEVICE (DME), >=50 CM: ICD-10-PCS | Mod: ,,, | Performed by: SURGERY

## 2019-02-13 PROCEDURE — 36000705 HC OR TIME LEV I EA ADD 15 MIN: Performed by: SURGERY

## 2019-02-13 PROCEDURE — 88305 TISSUE SPECIMEN TO PATHOLOGY - SURGERY: ICD-10-PCS | Mod: 26,,, | Performed by: PATHOLOGY

## 2019-02-13 PROCEDURE — 97606 NEG PRS WND THER DME>50 SQCM: CPT | Mod: ,,, | Performed by: SURGERY

## 2019-02-13 RX ORDER — CLINDAMYCIN PHOSPHATE 900 MG/50ML
900 INJECTION, SOLUTION INTRAVENOUS
Status: DISCONTINUED | OUTPATIENT
Start: 2019-02-13 | End: 2019-02-13 | Stop reason: HOSPADM

## 2019-02-13 RX ORDER — OXYCODONE AND ACETAMINOPHEN 5; 325 MG/1; MG/1
1 TABLET ORAL EVERY 6 HOURS PRN
Qty: 30 TABLET | Refills: 0 | Status: SHIPPED | OUTPATIENT
Start: 2019-02-13 | End: 2019-02-27

## 2019-02-13 RX ORDER — CLINDAMYCIN PHOSPHATE 150 MG/ML
900 INJECTION, SOLUTION INTRAVENOUS ONCE
Status: DISCONTINUED | OUTPATIENT
Start: 2019-02-13 | End: 2019-02-13 | Stop reason: CLARIF

## 2019-02-13 RX ORDER — SODIUM CHLORIDE 0.9 % (FLUSH) 0.9 %
3 SYRINGE (ML) INJECTION
Status: DISCONTINUED | OUTPATIENT
Start: 2019-02-13 | End: 2019-02-13 | Stop reason: HOSPADM

## 2019-02-13 RX ORDER — HYDROMORPHONE HYDROCHLORIDE 2 MG/ML
0.2 INJECTION, SOLUTION INTRAMUSCULAR; INTRAVENOUS; SUBCUTANEOUS EVERY 5 MIN PRN
Status: DISCONTINUED | OUTPATIENT
Start: 2019-02-13 | End: 2019-02-13 | Stop reason: HOSPADM

## 2019-02-13 RX ORDER — MIDAZOLAM HYDROCHLORIDE 1 MG/ML
INJECTION, SOLUTION INTRAMUSCULAR; INTRAVENOUS
Status: DISCONTINUED | OUTPATIENT
Start: 2019-02-13 | End: 2019-02-13

## 2019-02-13 RX ORDER — BACITRACIN 50000 [IU]/1
INJECTION, POWDER, FOR SOLUTION INTRAMUSCULAR
Status: DISCONTINUED | OUTPATIENT
Start: 2019-02-13 | End: 2019-02-13 | Stop reason: HOSPADM

## 2019-02-13 RX ADMIN — MIDAZOLAM HYDROCHLORIDE 1 MG: 1 INJECTION, SOLUTION INTRAMUSCULAR; INTRAVENOUS at 08:02

## 2019-02-13 RX ADMIN — CLINDAMYCIN PHOSPHATE 900 MG: 18 INJECTION, SOLUTION INTRAVENOUS at 07:02

## 2019-02-13 RX ADMIN — MIDAZOLAM HYDROCHLORIDE 1 MG: 1 INJECTION, SOLUTION INTRAMUSCULAR; INTRAVENOUS at 07:02

## 2019-02-13 NOTE — ANESTHESIA POSTPROCEDURE EVALUATION
"Anesthesia Post Evaluation    Patient: Marvin Ray    Procedure(s) Performed: Procedure(s) (LRB):  LEFT FOOT WOUND DEBRIDEMENT, WASHOUT AND ALL OTHER INDICATED PROCEDURES (Left)    Final Anesthesia Type: general  Patient location during evaluation: PACU  Patient participation: Yes- Able to Participate  Level of consciousness: awake and alert, oriented and awake  Post-procedure vital signs: reviewed and stable  Airway patency: patent  PONV status at discharge: No PONV  Anesthetic complications: no      Cardiovascular status: blood pressure returned to baseline  Respiratory status: unassisted, spontaneous ventilation and room air  Hydration status: euvolemic  Follow-up not needed.        Visit Vitals  /63   Pulse 77   Temp 36.7 °C (98 °F)   Resp 16   Ht 5' 10" (1.778 m)   Wt 93 kg (205 lb)   SpO2 97%   BMI 29.41 kg/m²       Pain/Ralf Score: No Data Recorded      "

## 2019-02-13 NOTE — BRIEF OP NOTE
[facility]  Brief Operative Note     SUMMARY     Surgery Date: 2/13/2019     Surgeon(s) and Role:     * Mitch Chan MD - Primary    Assisting Surgeon: None    Pre-op Diagnosis:  Open wound of left foot, sequela [S91.302S]    Post-op Diagnosis:  Post-Op Diagnosis Codes:     * Open wound of left foot, sequela [S91.302S]    Procedure:  1. L foot wound debridement down to level of bone, wound measurements 25cm x 17 cm x 1 cm  2. L foot wound washout with pulsevac, wound measurements 25cm x 17 cm x 1 cm    Anesthesia: Regional    Description of the findings of the procedure: fibrinous tissue to dorsum of foot, no purulence    Findings/Key Components: tissue bleeding well    Estimated Blood Loss: <5cc         Specimens:   Specimen (12h ago, onward)    None          Discharge Note    SUMMARY     Admit Date: 2/13/2019    Discharge Date and Time:  02/13/2019 8:53 AM    Hospital Course (synopsis of major diagnoses, care, treatment, and services provided during the course of the hospital stay): No new issues or acute events postoperatively.      Final Diagnosis: Post-Op Diagnosis Codes:     * Open wound of left foot, sequela [S91.302S]    Disposition: Home or Self Care    Follow Up/Patient Instructions: Resume diabetic diet, follow-up in 2 weeks, resume regular activity in 2-3 days.    Resume medications per post-procedure med reconciliation.      Medications:  Reconciled Home Medications:      Medication List      ASK your doctor about these medications    allopurinol 300 MG tablet  Commonly known as:  ZYLOPRIM  Take 1 tablet (300 mg total) by mouth once daily.     aspirin 81 MG Chew  Take 81 mg by mouth once daily.     brimonidine 0.2% 0.2 % Drop  Commonly known as:  ALPHAGAN  Place 1 drop into both eyes 2 (two) times daily.     carvedilol 3.125 MG tablet  Commonly known as:  COREG  Take 1 tablet (3.125 mg total) by mouth 2 (two) times daily.     coenzyme Q10 100 mg capsule  Take 100 mg by mouth  "every morning.     dorzolamide-timolol 2-0.5% 22.3-6.8 mg/mL ophthalmic solution  Commonly known as:  COSOPT  Place 1 drop into both eyes 2 (two) times daily.     ezetimibe 10 mg tablet  Commonly known as:  ZETIA  Take 1 tablet (10 mg total) by mouth once daily.     fish oil-omega-3 fatty acids 300-1,000 mg capsule  Take 1 capsule by mouth 2 (two) times daily.     furosemide 40 MG tablet  Commonly known as:  LASIX  Take 1.5 tablets (60 mg total) by mouth 2 (two) times daily.     insulin aspart U-100 100 unit/mL Inpn pen  Commonly known as:  NovoLOG  Use on premeal readings: 150-200=+2, 201-250=+4; 251-300=+6; 301-350=+8, over 350=+10 units     insulin degludec-liraglutide 100 unit-3.6 mg /mL (3 mL) Inpn  Commonly known as:  XULTOPHY 100/3.6  Inject 42 Units into the skin once daily.     metOLazone 5 MG tablet  Commonly known as:  ZAROXOLYN  Take 1 tablet (5 mg total) by mouth every Monday and Thursday. 30 minutes before lasix.     miconazole NITRATE 2 % 2 % top powder  Commonly known as:  MICOTIN  Apply topically 2 (two) times daily.     multivitamin Tab  Take 1 tablet by mouth every morning.     oxyCODONE-acetaminophen 5-325 mg per tablet  Commonly known as:  PERCOCET  Take 1 tablet by mouth every 6 (six) hours as needed for Pain.     pen needle, diabetic 31 gauge x 1/4" Ndle  Use as directed     senna-docusate 8.6-50 mg 8.6-50 mg per tablet  Commonly known as:  PERICOLACE  Take 1 tablet by mouth 2 (two) times daily.     sulfamethoxazole-trimethoprim 800-160mg 800-160 mg Tab  Commonly known as:  BACTRIM DS  Take 1 tablet by mouth 2 (two) times daily. for 14 days  Ask about: Should I take this medication?          No discharge procedures on file.    "

## 2019-02-13 NOTE — ANESTHESIA PREPROCEDURE EVALUATION
02/13/2019  Marvin Ray is a 58 y.o., male.    Pre-op Assessment    I have reviewed the Patient Summary Reports.     I have reviewed the Nursing Notes.   I have reviewed the Medications.     Review of Systems  Anesthesia Hx:  No problems with previous Anesthesia Denies Hx of Anesthetic complications  Neg history of prior surgery. Denies Family Hx of Anesthesia complications.   Denies Personal Hx of Anesthesia complications.   Social:  Former Smoker, Alcohol Use    Hematology/Oncology:  Hematology Normal   Oncology Normal     EENT/Dental:EENT/Dental Normal   Cardiovascular:   Exercise tolerance: good Pacemaker Hypertension Valvular problems/Murmurs, MR CAD   CHF PVD hyperlipidemia Echo 10/2016:  CONCLUSIONS     1 - Moderately depressed left ventricular systolic function (EF 35-40%).     2 - Quantitatively measured LV function is 38%.     3 - Left ventricular diastolic dysfunction.     4 - Mild mitral regurgitation.     5 - Severe left atrial enlargement.     6 - Pulmonary hypertension. The estimated PA systolic pressure is 54 mmHg.     7 - Mild pulmonic regurgitation.     8 - Mild tricuspid regurgitation.     9 - Right ventricular enlargement with normal systolic function.     10 - Small pericardial effusion.     11 - Intermediate central venous pressure.     Cardiac defibrillator in place   Pulmonary:  Pulmonary Normal    Renal/:   Chronic Renal Disease, CRI    Hepatic/GI:   No Bowel Prep. Denies PUD. Denies Hiatal Hernia.  Denies GERD. Liver Disease,  Denies Hepatitis.    Musculoskeletal:   Arthritis     Neurological:   Denies CVA. Neuromuscular Disease,  Denies Seizures.   Peripheral Neuropathy    Endocrine:   Diabetes, type 2, using insulin Denies Hypothyroidism. Denies Hyperthyroidism. Hgb a1c -8.2%   Dermatological:  Skin Normal    Psych:  Psychiatric Normal           Physical  Exam  General:  Obesity    Airway/Jaw/Neck:  AIRWAY FINDINGS: Normal      Chest/Lungs:  Chest/Lungs Clear    Heart/Vascular:  Heart Findings: Normal       Mental Status:  Mental Status Findings: Normal        Anesthesia Plan  Type of Anesthesia, risks & benefits discussed:  Anesthesia Type:  regional, general  Patient's Preference:   Intra-op Monitoring Plan: standard ASA monitors  Intra-op Monitoring Plan Comments:   Post Op Pain Control Plan:   Post Op Pain Control Plan Comments:   Induction:   IV  Beta Blocker:         Informed Consent:    ASA Score: 3     Day of Surgery Review of History & Physical:        Anesthesia Plan Notes: 58 yr old man w/ cardiac dz (reduced EF, Pulm HTN, MR, defibrillator), PVD and DM here nonhealing foot wound.   He has been done before under regional block.           Ready For Surgery From Anesthesia Perspective.

## 2019-02-13 NOTE — DISCHARGE INSTRUCTIONS
DIET: You may resume your home diet. If nausea is present, increase your diet gradually with fluids and bland foods    ACTIVITY LEVEL: You have received sedation or an anesthetic, you may feel sleepy for several hours. Rest until you are more awake. Gradually resume your normal activities    Medications: Pain medication should be taken only if needed and as directed. If antibiotics are prescribed, the medication should be taken until completed. You will be given an updated list of you medications.    No driving, alcoholic beverages or signing legal documents for next 24 hours or while taking pain medication.       CALL THE DOCTOR:    For any obvious bleeding (some dried blood over the incision is normal).      Redness, swelling, foul smell around incision or fever over 101.   Shortness of breath, Coughing up Bloody sputum, Pains or Swelling in your Calves .   Persistent pain or nausea not relieved by medication.    If any unusual problems or difficulties occur contact your doctor. If you cannot contact your doctor but feel your signs and symptoms warrant a physicians attention return to the emergency room.    Continue wound care as discussed with Dr. Chan  Call him with any problems, questions or concerns        Fall Prevention  Millions of people fall every year and injure themselves. You may have had anesthesia or sedation which may increase your risk of falling. You may have health issues that put you at an increased risk of falling.     Here are ways to reduce your risk of falling.  ·   · Make your home safe by keeping walkways clear of objects you may trip over.  · Use non-slip pads under rugs. Do not use area rugs or small throw rugs.  · Use non-slip mats in bathtubs and showers.  · Install handrails and lights on staircases.  · Do not walk in poorly lit areas.  · Do not stand on chairs or wobbly ladders.  · Use caution when reaching overhead or looking upward. This position can cause a loss of  balance.  · Be sure your shoes fit properly, have non-slip bottoms and are in good condition.   · Wear shoes both inside and out. Avoid going barefoot or wearing slippers.  · Be cautious when going up and down stairs, curbs, and when walking on uneven sidewalks.  · If your balance is poor, consider using a cane or walker.  · If your fall was related to alcohol use, stop or limit alcohol intake.   · If your fall was related to use of sleeping medicines, talk to your doctor about this. You may need to reduce your dosage at bedtime if you awaken during the night to go to the bathroom.    · To reduce the need for nighttime bathroom trips:  ¨ Avoid drinking fluids for several hours before going to bed  ¨ Empty your bladder before going to bed  ¨ Men can keep a urinal at the bedside  · Stay as active as you can. Balance, flexibility, strength, and endurance all come from exercise. They all play a role in preventing falls. Ask your healthcare provider which types of activity are right for you.  · Get your vision checked on a regular basis.  · If you have pets, know where they are before you stand up or walk so you don't trip over them.  · Use night lights.

## 2019-02-13 NOTE — TRANSFER OF CARE
"Anesthesia Transfer of Care Note    Patient: Marvin Ray    Procedure(s) Performed: Procedure(s) (LRB):  LEFT FOOT WOUND DEBRIDEMENT, WASHOUT AND ALL OTHER INDICATED PROCEDURES (Left)    Patient location: [facility]    Anesthesia Type: MAC and regional    Transport from OR: Transported from OR on room air with adequate spontaneous ventilation    Post pain: adequate analgesia    Post assessment: no apparent anesthetic complications and tolerated procedure well    Post vital signs: stable    Level of consciousness: awake, alert and oriented    Nausea/Vomiting: no nausea/vomiting    Complications: none    Transfer of care protocol was followed      Last vitals:   Visit Vitals  BP (!) 106/58 (BP Location: Left arm)   Pulse 76   Temp 36.8 °C (98.2 °F) (Oral)   Resp 17   Ht 5' 10" (1.778 m)   Wt 93 kg (205 lb)   SpO2 97%   BMI 29.41 kg/m²     "

## 2019-02-14 ENCOUNTER — TELEPHONE (OUTPATIENT)
Dept: INFECTIOUS DISEASES | Facility: CLINIC | Age: 59
End: 2019-02-14

## 2019-02-14 ENCOUNTER — TELEPHONE (OUTPATIENT)
Dept: ADMINISTRATIVE | Facility: CLINIC | Age: 59
End: 2019-02-14

## 2019-02-14 ENCOUNTER — TELEPHONE (OUTPATIENT)
Dept: VASCULAR SURGERY | Facility: CLINIC | Age: 59
End: 2019-02-14

## 2019-02-14 LAB — POCT GLUCOSE: 128 MG/DL (ref 70–110)

## 2019-02-14 NOTE — TELEPHONE ENCOUNTER
----- Message from Aleksandra Mcclendon MA sent at 2/14/2019 12:07 PM CST -----  Contact: Sister :  Chloé    tel: [phone]   Let me know what you would like to do  ----- Message -----  From: Kamini Garza  Sent: 2/14/2019  11:56 AM  To: Jenna Metcalf Staff    Needs Advice    Reason for call:   Pt.had a procedure on his foot yesterday and today he has a fever.   Can you see him today?    Pls call to discuss this.         Communication Preference:  Phone     Additional Information: pls call.

## 2019-02-14 NOTE — TELEPHONE ENCOUNTER
Called patient's sister and re-assured her.  If fevers persist, they are to go to the ER.  Add him to my schedule for Monday before I go on hospital service and notify them of the time.

## 2019-02-14 NOTE — TELEPHONE ENCOUNTER
Call to patient's sister Chloé to give her date and time of his appointment with the office. She stated understanding.

## 2019-02-15 ENCOUNTER — TELEPHONE (OUTPATIENT)
Dept: INFECTIOUS DISEASES | Facility: CLINIC | Age: 59
End: 2019-02-15

## 2019-02-15 DIAGNOSIS — M86.272 SUBACUTE OSTEOMYELITIS OF LEFT FOOT: Primary | ICD-10-CM

## 2019-02-15 LAB
ACID FAST MOD KINY STN SPEC: NORMAL
MYCOBACTERIUM SPEC QL CULT: NORMAL

## 2019-02-15 NOTE — TELEPHONE ENCOUNTER
Called IR, got no answer. Left a message for Josette Tracy to give me a call back to schedule PICC line

## 2019-02-17 LAB
BACTERIA SPEC AEROBE CULT: NORMAL
BACTERIA SPEC AEROBE CULT: NORMAL
BACTERIA SPEC ANAEROBE CULT: NORMAL
BACTERIA SPEC ANAEROBE CULT: NORMAL

## 2019-02-18 ENCOUNTER — TELEPHONE (OUTPATIENT)
Dept: INFECTIOUS DISEASES | Facility: CLINIC | Age: 59
End: 2019-02-18

## 2019-02-18 ENCOUNTER — OFFICE VISIT (OUTPATIENT)
Dept: INFECTIOUS DISEASES | Facility: CLINIC | Age: 59
End: 2019-02-18
Payer: COMMERCIAL

## 2019-02-18 ENCOUNTER — LAB VISIT (OUTPATIENT)
Dept: LAB | Facility: HOSPITAL | Age: 59
End: 2019-02-18
Attending: INTERNAL MEDICINE
Payer: COMMERCIAL

## 2019-02-18 VITALS
TEMPERATURE: 98 F | DIASTOLIC BLOOD PRESSURE: 62 MMHG | HEIGHT: 70 IN | BODY MASS INDEX: 31.06 KG/M2 | HEART RATE: 80 BPM | WEIGHT: 216.94 LBS | SYSTOLIC BLOOD PRESSURE: 108 MMHG

## 2019-02-18 DIAGNOSIS — E87.6 HYPOKALEMIA: Primary | ICD-10-CM

## 2019-02-18 DIAGNOSIS — M86.272 SUBACUTE OSTEOMYELITIS OF LEFT FOOT: ICD-10-CM

## 2019-02-18 DIAGNOSIS — M86.272 SUBACUTE OSTEOMYELITIS OF LEFT FOOT: Primary | ICD-10-CM

## 2019-02-18 LAB
ALBUMIN SERPL BCP-MCNC: 2 G/DL
ALP SERPL-CCNC: 131 U/L
ALT SERPL W/O P-5'-P-CCNC: 22 U/L
ANION GAP SERPL CALC-SCNC: 10 MMOL/L
AST SERPL-CCNC: 21 U/L
BASOPHILS # BLD AUTO: 0.05 K/UL
BASOPHILS NFR BLD: 0.8 %
BILIRUB SERPL-MCNC: 0.3 MG/DL
BUN SERPL-MCNC: 45 MG/DL
CALCIUM SERPL-MCNC: 8.3 MG/DL
CHLORIDE SERPL-SCNC: 94 MMOL/L
CO2 SERPL-SCNC: 27 MMOL/L
CREAT SERPL-MCNC: 0.9 MG/DL
CRP SERPL-MCNC: 66.3 MG/L
DIFFERENTIAL METHOD: ABNORMAL
EOSINOPHIL # BLD AUTO: 0.3 K/UL
EOSINOPHIL NFR BLD: 4.3 %
ERYTHROCYTE [DISTWIDTH] IN BLOOD BY AUTOMATED COUNT: 17.6 %
EST. GFR  (AFRICAN AMERICAN): >60 ML/MIN/1.73 M^2
EST. GFR  (NON AFRICAN AMERICAN): >60 ML/MIN/1.73 M^2
GLUCOSE SERPL-MCNC: 149 MG/DL
HCT VFR BLD AUTO: 28.5 %
HGB BLD-MCNC: 8.8 G/DL
IMM GRANULOCYTES # BLD AUTO: 0.04 K/UL
IMM GRANULOCYTES NFR BLD AUTO: 0.7 %
LYMPHOCYTES # BLD AUTO: 0.5 K/UL
LYMPHOCYTES NFR BLD: 8.7 %
MCH RBC QN AUTO: 26.4 PG
MCHC RBC AUTO-ENTMCNC: 30.9 G/DL
MCV RBC AUTO: 86 FL
MONOCYTES # BLD AUTO: 0.6 K/UL
MONOCYTES NFR BLD: 10.2 %
NEUTROPHILS # BLD AUTO: 4.6 K/UL
NEUTROPHILS NFR BLD: 75.3 %
NRBC BLD-RTO: 0 /100 WBC
PLATELET # BLD AUTO: 310 K/UL
PMV BLD AUTO: 8.8 FL
POTASSIUM SERPL-SCNC: 2.7 MMOL/L
PROT SERPL-MCNC: 6.2 G/DL
RBC # BLD AUTO: 3.33 M/UL
SODIUM SERPL-SCNC: 131 MMOL/L
WBC # BLD AUTO: 6.07 K/UL

## 2019-02-18 PROCEDURE — 99215 PR OFFICE/OUTPT VISIT, EST, LEVL V, 40-54 MIN: ICD-10-PCS | Mod: S$GLB,,, | Performed by: INTERNAL MEDICINE

## 2019-02-18 PROCEDURE — 3074F SYST BP LT 130 MM HG: CPT | Mod: CPTII,S$GLB,, | Performed by: INTERNAL MEDICINE

## 2019-02-18 PROCEDURE — 3008F BODY MASS INDEX DOCD: CPT | Mod: CPTII,S$GLB,, | Performed by: INTERNAL MEDICINE

## 2019-02-18 PROCEDURE — 3078F DIAST BP <80 MM HG: CPT | Mod: CPTII,S$GLB,, | Performed by: INTERNAL MEDICINE

## 2019-02-18 PROCEDURE — 36415 COLL VENOUS BLD VENIPUNCTURE: CPT

## 2019-02-18 PROCEDURE — 99215 OFFICE O/P EST HI 40 MIN: CPT | Mod: S$GLB,,, | Performed by: INTERNAL MEDICINE

## 2019-02-18 PROCEDURE — 86140 C-REACTIVE PROTEIN: CPT

## 2019-02-18 PROCEDURE — 99999 PR PBB SHADOW E&M-EST. PATIENT-LVL III: CPT | Mod: PBBFAC,,, | Performed by: INTERNAL MEDICINE

## 2019-02-18 PROCEDURE — 3008F PR BODY MASS INDEX (BMI) DOCUMENTED: ICD-10-PCS | Mod: CPTII,S$GLB,, | Performed by: INTERNAL MEDICINE

## 2019-02-18 PROCEDURE — 99999 PR PBB SHADOW E&M-EST. PATIENT-LVL III: ICD-10-PCS | Mod: PBBFAC,,, | Performed by: INTERNAL MEDICINE

## 2019-02-18 PROCEDURE — 3074F PR MOST RECENT SYSTOLIC BLOOD PRESSURE < 130 MM HG: ICD-10-PCS | Mod: CPTII,S$GLB,, | Performed by: INTERNAL MEDICINE

## 2019-02-18 PROCEDURE — 3078F PR MOST RECENT DIASTOLIC BLOOD PRESSURE < 80 MM HG: ICD-10-PCS | Mod: CPTII,S$GLB,, | Performed by: INTERNAL MEDICINE

## 2019-02-18 PROCEDURE — 85025 COMPLETE CBC W/AUTO DIFF WBC: CPT

## 2019-02-18 PROCEDURE — 80053 COMPREHEN METABOLIC PANEL: CPT

## 2019-02-18 RX ORDER — POTASSIUM CHLORIDE 20 MEQ/1
20 TABLET, EXTENDED RELEASE ORAL DAILY
Qty: 7 TABLET | Refills: 0 | Status: SHIPPED | OUTPATIENT
Start: 2019-02-18 | End: 2019-02-25

## 2019-02-18 NOTE — TELEPHONE ENCOUNTER
Notify him that his kidney function is much better.  However his potassium levels are low.  I will send prescription for potassium pills to is pharmacy.

## 2019-02-18 NOTE — TELEPHONE ENCOUNTER
----- Message from Aleksandra Mcclendon MA sent at 2/18/2019 12:29 PM CST -----  Contact: Dayanara Renee  Pt has a potassium of 2.7

## 2019-02-18 NOTE — PROGRESS NOTES
Subjective:      Patient ID: Marvin Ray is a 58 y.o. male.    Chief Complaint:Follow-up    History of Present Illness    59 y/o male with a history of DM type 2, CAD with CHF s/p AICD placement recently admitted to the hospital for a polymicrobial infection of his left foot.  He is being followed by Dr. Chan with vascular surgery.  He is s/p debridement of his foot on 11/16/18.  He underwent subsequent debridements on 12/5/18 (with amputation of left great toe) and on December 28.  Cultures from his foot wounds have been positive for VRE, stenotrophomonas sp, and MDR Pseudomonas sp.  He was only on daptomycin due to concerns for renal toxicity with use of bactrim for the stenotrophomonas and aminoglycoside for the pseudomonas.  He completed his antibiotics and has been off of antibiotics for about 2 weeks.  He underwent further debridement of his left foot by Dr. Chan on February 13.  Cultures from that surgical procedure are positive for pseudomonas putida and acinetobacter sp.  Both pathogens have significant resistance mutations.      Review of Systems   Constitution: Positive for fever. Negative for chills, decreased appetite, weakness, malaise/fatigue, night sweats, weight gain and weight loss.   HENT: Negative for congestion, ear pain, hearing loss, hoarse voice, sore throat and tinnitus.    Eyes: Negative for blurred vision, redness and visual disturbance.   Cardiovascular: Negative for chest pain, leg swelling and palpitations.   Respiratory: Negative for cough, hemoptysis, shortness of breath and sputum production.    Hematologic/Lymphatic: Negative for adenopathy. Bruises/bleeds easily.   Skin: Positive for itching. Negative for dry skin, rash and suspicious lesions.   Musculoskeletal: Negative for back pain, joint pain, myalgias and neck pain.   Gastrointestinal: Negative for abdominal pain, constipation, diarrhea, heartburn, nausea and vomiting.   Genitourinary: Negative for dysuria,  flank pain, frequency, hematuria, hesitancy and urgency.   Neurological: Positive for paresthesias. Negative for dizziness, headaches and numbness.   Psychiatric/Behavioral: Negative for depression and memory loss. The patient does not have insomnia and is not nervous/anxious.      Objective:   Physical Exam   Constitutional: He is oriented to person, place, and time. He appears well-developed and well-nourished. No distress.   HENT:   Head: Normocephalic and atraumatic.   Right Ear: External ear normal.   Left Ear: External ear normal.   Nose: Nose normal.   Mouth/Throat: Oropharynx is clear and moist. No oropharyngeal exudate.   Eyes: Conjunctivae and EOM are normal. Pupils are equal, round, and reactive to light. Right eye exhibits no discharge. Left eye exhibits no discharge. No scleral icterus.   Neck: Normal range of motion. Neck supple. No JVD present. No tracheal deviation present. No thyromegaly present.   Cardiovascular: Normal rate, regular rhythm and intact distal pulses. Exam reveals no gallop and no friction rub.   No murmur heard.  Pulmonary/Chest: Effort normal and breath sounds normal. No stridor. No respiratory distress. He has no wheezes. He has no rales. He exhibits no tenderness.   Abdominal: Soft. Bowel sounds are normal. He exhibits no distension and no mass. There is no tenderness. There is no rebound and no guarding.   Musculoskeletal: Normal range of motion. He exhibits no edema or tenderness.   Left foot wrapped in dressings.   Lymphadenopathy:     He has no cervical adenopathy.   Neurological: He is alert and oriented to person, place, and time. He has normal reflexes. He displays normal reflexes. No cranial nerve deficit. He exhibits normal muscle tone. Coordination normal.   Skin: Skin is warm. No rash noted. He is not diaphoretic. No erythema. No pallor.   Psychiatric: He has a normal mood and affect. His behavior is normal. Judgment and thought content normal.   Nursing note and  vitals reviewed.    Assessment:       1. Subacute osteomyelitis of left foot        S/p debridement on February 13. Cultures positive for Pseudomonas putida and Acinetobacter sp.  Both organisms with significant resistance.  Will plan to treat with high dose zerbaxa (ceftolozane-tazobactam) 3 grams IV q 8 hours.  Anticipate 4-6 week course.  Will monitor his renal function closely.  Plan:       Subacute osteomyelitis of left foot  -     Comprehensive metabolic panel; Future; Expected date: 02/18/2019  -     CBC auto differential; Future; Expected date: 02/18/2019  -     C-reactive protein; Future; Expected date: 02/18/2019        - Ceftolozane-tazobactam 3 grams IV q 8 hours for 6 weeks

## 2019-02-19 LAB
BACTERIA SPEC AEROBE CULT: NORMAL
BACTERIA SPEC AEROBE CULT: NORMAL

## 2019-02-20 ENCOUNTER — HOSPITAL ENCOUNTER (OUTPATIENT)
Dept: INTERVENTIONAL RADIOLOGY/VASCULAR | Facility: HOSPITAL | Age: 59
Discharge: HOME | End: 2019-02-20
Attending: INTERNAL MEDICINE
Payer: COMMERCIAL

## 2019-02-20 VITALS
OXYGEN SATURATION: 98 % | SYSTOLIC BLOOD PRESSURE: 116 MMHG | HEART RATE: 83 BPM | RESPIRATION RATE: 18 BRPM | DIASTOLIC BLOOD PRESSURE: 63 MMHG

## 2019-02-20 DIAGNOSIS — M86.272 SUBACUTE OSTEOMYELITIS OF LEFT FOOT: ICD-10-CM

## 2019-02-20 PROCEDURE — 36573 IR PICC LINE PLACEMENT W/O PORT, W/IMG > 5 Y/O: ICD-10-PCS | Mod: RT,,, | Performed by: RADIOLOGY

## 2019-02-20 PROCEDURE — C1751 CATH, INF, PER/CENT/MIDLINE: HCPCS

## 2019-02-20 PROCEDURE — 36573 INSJ PICC RS&I 5 YR+: CPT | Mod: RT,,, | Performed by: RADIOLOGY

## 2019-02-20 NOTE — PROCEDURES
"Radiology Post-Procedure Note    Pre Op Diagnosis: Osteomyelitis    Post Op Diagnosis: Same    Procedure: PICC placement    Procedure performed by: Monica    Written Informed Consent Obtained: Yes    Specimen Removed: No    Estimated Blood Loss: Minimal    Findings:   Using realtime U/S guidance a 5 Fr PICC catheter was placed into the right Brachial Vein with tip of the catheter in the SVC.    PICC is ready for use.     Klaus Martnis MD ([facility])  Radiology PGY-5  [phone]      "

## 2019-02-20 NOTE — H&P
Radiology History & Physical      SUBJECTIVE:     Chief Complaint: preprocedure    History of Present Illness:  Marvin Ray is a 58 y.o. male who was recently admitted to the hospital for a polymicrobial infection of his left foot. He has undergone multiple debridements of his left foot for VRE, stenotrophomonas sp, and MDR Pseudomonas sp. and will now require a PICC line for administration of long term meropenem.     Past Medical History:   Diagnosis Date    Arthritis     Cellulitis     CKD (chronic kidney disease), stage III     Coronary artery disease     Diabetes mellitus     Diabetic retinopathy     Diabetic ulcer of left foot     Glaucoma     Gout     Hyperlipemia     Hypertension     ICD (implantable cardioverter-defibrillator) in place 11/02/2018    Left chest    Non-pressure chronic ulcer of other part of left foot with fat layer exposed 10/23/2018    PVD (peripheral vascular disease)     Type 2 diabetes mellitus with left diabetic foot ulcer 10/29/2018    Unsteady gait     uses a wheelchair     Past Surgical History:   Procedure Laterality Date    AHMED GLAUCOMA IMPLANT Left 2011    DONE AT [facility]    AMPUTATION, TOE Left 12/5/2018    [facility] at [facility] OR    AMPUTATION, TOES  2-5 Left 11/16/2018    [facility] at [facility] OR    BAERVELDT GLAUCOMA IMPLANT Left 2012    WITH CATARACT EXTRACTION//DONE AT [facility]    CARDIAC CATHETERIZATION Left 05/2016    CARDIAC DEFIBRILLATOR PLACEMENT Left 11/02/2018    CATARACT EXTRACTION W/  INTRAOCULAR LENS IMPLANT Left 2012    WITH BAERVEDT//DONE AT [facility]    CATARACT EXTRACTION W/  INTRAOCULAR LENS IMPLANT Right 09/26/2018    COMPLEX ()    CB DESTRUCTION WITH CYCLO G6 Left 02/15/2017        CYST REMOVAL      DEBRIDEMENT, FOOT  12/28/2018    Performed by [facility] at [facility] OR    Exam under anesthesia, left foot debridement, left foot washout, possible  left second through fifth metatarsal resection Left 12/28/2018    [facility] OR    Exam under anesthesia, left foot debridement, washout and all other indicated procedures Left 12/5/2018    Per[facility] Mitch [facility] at [facility] OR    EXCISION, LESION, METATARSAL BONE  12/28/2018    Performed [facility] Mitch [facility] at [facility] OR    HEART CATH-LEFT N/A 5/6/2016    Performed by Mike Magana MD at [facility] CATH LAB    INCISION AND DRAINAGE Left 11/16/2018    Performed by Mitch Chan [facility] at [facility] OR    Incision and Drainage, left lower extremity debridement, washout Left 11/14/2018    Performed by Mitch Chan MD at [facility] OR    INSERTION, ICD GENERATOR, SINGLE CHAMBER N/A 11/2/2018    Performed by Pernell Greer MD at [facility] CATH LAB    INSERTION, IOL PROSTHESIS Right 9/26/2018    [facility]    LEFT FOOT WOUND DEBRIDEMENT, WASHOUT AND ALL OTHER INDICATED PROCEDURES Left 2/13/2019    Performed by Mitch hCan MD at [facility] OR    PARACENTESIS, ABDOMINAL, REPEAT N/A 2/11/2019    Performed by [facility] Diagnostic Provider at [facility] OR    PHACOEMULSIFICATION, CATARACT Right 9/26/2018    [facility]    Right foot surgery  10/2014    TOE AMPUTATION Right     first and second    TONSILLECTOMY      TRABECULECTOMY/G 6 LASER Left 2/15/2017    Performed by Perla Cortés MD at [facility] OR 1ST FLR       Home Meds:   Prior to Admission medications    Medication Sig Start Date End Date Taking? Authorizing Provider   allopurinol (ZYLOPRIM) 300 MG tablet Take 1 tablet (300 mg total) by mouth once daily. 1/23/19   Sara Ching MD   aspirin 81 MG Chew Take 81 mg by mouth once daily.    Historical Provider, MD   brimonidine 0.2% (ALPHAGAN) 0.2 % Drop Place 1 drop into both eyes 2 (two) times daily. 2/8/19   Perla Cortés MD   carvedilol (COREG) 3.125 MG tablet Take 1  "tablet (3.125 mg total) by mouth 2 (two) times daily. 1/23/19 1/23/20  Sara Ching MD   coenzyme Q10 100 mg capsule Take 100 mg by mouth every morning.    Historical Provider, MD   dorzolamide-timolol 2-0.5% (COSOPT) 22.3-6.8 mg/mL ophthalmic solution Place 1 drop into both eyes 2 (two) times daily. 2/8/19 2/8/20  Perla Cortés MD   ezetimibe (ZETIA) 10 mg tablet Take 1 tablet (10 mg total) by mouth once daily. 9/7/18 9/7/19  Sheryl Madison NP   fish oil-omega-3 fatty acids 300-1,000 mg capsule Take 1 capsule by mouth 2 (two) times daily. 1/23/19   Sara Ching MD   furosemide (LASIX) 40 MG tablet Take 1.5 tablets (60 mg total) by mouth 2 (two) times daily. 1/23/19 1/23/20  Sara Ching MD   insulin aspart U-100 (NOVOLOG) 100 unit/mL InPn pen Use on premeal readings: 150-200=+2, 201-250=+4; 251-300=+6; 301-350=+8, over 350=+10 units 1/31/19   Sheryl Madison NP   insulin degludec-liraglutide (XULTOPHY 100/3.6) 100 unit-3.6 mg /mL (3 mL) InPn Inject 42 Units into the skin once daily. 2/6/19   Sheryl Madison NP   metOLazone (ZAROXOLYN) 5 MG tablet Take 1 tablet (5 mg total) by mouth every Monday and Thursday. 30 minutes before lasix. 1/24/19   Sara Ching MD   miconazole NITRATE 2 % (MICOTIN) 2 % top powder Apply topically 2 (two) times daily. 12/19/18   Kirsten Trinh MD   MULTIVIT,THER IRON,CA,FA & MIN (MULTIVITAMIN) Tab Take 1 tablet by mouth every morning.     Historical Provider, MD   oxyCODONE-acetaminophen (PERCOCET) 5-325 mg per tablet Take 1 tablet by mouth every 6 (six) hours as needed for Pain. 1/24/19   Keyana Haider, SCOTT   oxyCODONE-acetaminophen (PERCOCET) 5-325 mg per tablet Take 1 tablet by mouth every 6 (six) hours as needed for Pain. 2/13/19 2/27/19  Mitch Chan MD   pen needle, diabetic 31 gauge x 1/4" Ndle Use as directed 8/11/16   Mike Bass MD   potassium chloride SA (K-DUR,KLOR-CON) 20 MEQ tablet Take 1 tablet (20 mEq total) by mouth once daily. for 7 " doses 2/18/19 2/25/19  Dixon West MD   senna-docusate 8.6-50 mg (PERICOLACE) 8.6-50 mg per tablet Take 1 tablet by mouth 2 (two) times daily. 12/19/18   Kirsten Trinh MD     Anticoagulants/Antiplatelets: no anticoagulation    Allergies:   Review of patient's allergies indicates:   Allergen Reactions    Statins-hmg-coa reductase inhibitors      Generalized Pain    Onglyza [saxagliptin]     Penicillins Rash     Sedation History:  no adverse reactions    Review of Systems:   Hematological: no known coagulopathies  Respiratory: no shortness of breath  Cardiovascular: no chest pain  Gastrointestinal: no abdominal pain  Genito-Urinary: no dysuria  Musculoskeletal: negative  Neurological: no TIA or stroke symptoms         OBJECTIVE:     Vital Signs (Most Recent)       Physical Exam:  ASA: 2  Mallampati: 3  General: no acute distress  Mental Status: alert and oriented to person, place and time  HEENT: normocephalic, atraumatic  Chest: unlabored breathing  Heart: regular heart rate  Abdomen: nondistended  Extremity: moves all extremities    Laboratory  Lab Results   Component Value Date    INR 1.1 02/06/2019       Lab Results   Component Value Date    WBC 6.07 02/18/2019    HGB 8.8 (L) 02/18/2019    HCT 28.5 (L) 02/18/2019    MCV 86 02/18/2019     02/18/2019      Lab Results   Component Value Date     (H) 02/18/2019     (L) 02/18/2019    K 2.7 (LL) 02/18/2019    CL 94 (L) 02/18/2019    CO2 27 02/18/2019    BUN 45 (H) 02/18/2019    CREATININE 0.9 02/18/2019    CALCIUM 8.3 (L) 02/18/2019    MG 2.4 02/06/2019    ALT 22 02/18/2019    AST 21 02/18/2019    ALBUMIN 2.0 (L) 02/18/2019    BILITOT 0.3 02/18/2019       ASSESSMENT/PLAN:     Sedation Plan: Local Anesthesia  Patient will undergo PICC line placement.    Rip Talbot MD  Interventional Radiology PGY-II  [facility]  Pager: [phone]

## 2019-02-20 NOTE — DISCHARGE INSTRUCTIONS
For scheduling: Call Josette at [phone]    For questions or concerns call: TARYN MON-FRI 8 AM- 5PM [phone].   After hours call Radiology resident on call at [phone].    For immediate concerns that are not emergent, you may call our radiology clinic at: [phone]

## 2019-02-20 NOTE — PROGRESS NOTES
Pt arrived to  via wheelchair for picc line placement.  Name verified using two identifiers.  Allergies verified.  Will continue to monitor.

## 2019-02-20 NOTE — DISCHARGE SUMMARY
"Radiology Discharge Summary      Hospital Course: No complications    Admit Date: 2/20/2019  Discharge Date: 02/20/2019     Instructions Given to Patient: Yes  Diet: Resume prior diet  Activity: activity as tolerated    Description of Condition on Discharge: Stable  Vital Signs (Most Recent): Pulse: 83 (02/20/19 1039)  Resp: 18 (02/20/19 1039)  BP: 116/63 (02/20/19 1039)  SpO2: 98 % (02/20/19 1039)    Discharge Disposition: Home    Discharge Diagnosis: Osteomyelitis     Follow-up: with AMELIA Martins MD ([facility])  Radiology PGY-5  [phone]      "

## 2019-02-22 ENCOUNTER — TELEPHONE (OUTPATIENT)
Dept: INFECTIOUS DISEASES | Facility: CLINIC | Age: 59
End: 2019-02-22

## 2019-02-22 NOTE — TELEPHONE ENCOUNTER
----- Message from Kamini Garza sent at 2/22/2019 10:42 AM CST -----  Contact: Chloé / Sister   [phone]  Sister of pt. Wants to make it clearer for you ref. The previous conversation.  Would like to add that [facility] is fine, but it doesn't have to be them, they have no problem with [facility]  [facility].

## 2019-02-22 NOTE — TELEPHONE ENCOUNTER
----- Message from Chrystal Taylor sent at 2/22/2019 10:20 AM CST -----  Contact: Chloé/sister [phone]  .Needs Advice    Reason for call:        Communication Preference:phone    Additional Information:pt states they were suppose to get home health set up for her brother states he has a picc line in please call back to discuss

## 2019-02-22 NOTE — TELEPHONE ENCOUNTER
Spoke with Chloé, pt sister and I am coordinating with [facility] [facility] to set up home health

## 2019-02-28 ENCOUNTER — TELEPHONE (OUTPATIENT)
Dept: FAMILY MEDICINE | Facility: CLINIC | Age: 59
End: 2019-02-28

## 2019-02-28 ENCOUNTER — OFFICE VISIT (OUTPATIENT)
Dept: VASCULAR SURGERY | Facility: CLINIC | Age: 59
End: 2019-02-28
Payer: COMMERCIAL

## 2019-02-28 VITALS
SYSTOLIC BLOOD PRESSURE: 112 MMHG | WEIGHT: 216.94 LBS | HEIGHT: 70 IN | BODY MASS INDEX: 31.06 KG/M2 | DIASTOLIC BLOOD PRESSURE: 72 MMHG

## 2019-02-28 DIAGNOSIS — R18.8 OTHER ASCITES: Primary | ICD-10-CM

## 2019-02-28 DIAGNOSIS — S91.302D OPEN WOUND OF LEFT FOOT, SUBSEQUENT ENCOUNTER: Primary | ICD-10-CM

## 2019-02-28 PROCEDURE — 99999 PR PBB SHADOW E&M-EST. PATIENT-LVL III: ICD-10-PCS | Mod: PBBFAC,,, | Performed by: SURGERY

## 2019-02-28 PROCEDURE — 3078F DIAST BP <80 MM HG: CPT | Mod: CPTII,S$GLB,, | Performed by: SURGERY

## 2019-02-28 PROCEDURE — 3074F SYST BP LT 130 MM HG: CPT | Mod: CPTII,S$GLB,, | Performed by: SURGERY

## 2019-02-28 PROCEDURE — 99999 PR PBB SHADOW E&M-EST. PATIENT-LVL III: CPT | Mod: PBBFAC,,, | Performed by: SURGERY

## 2019-02-28 PROCEDURE — 99214 PR OFFICE/OUTPT VISIT, EST, LEVL IV, 30-39 MIN: ICD-10-PCS | Mod: S$GLB,,, | Performed by: SURGERY

## 2019-02-28 PROCEDURE — 3008F BODY MASS INDEX DOCD: CPT | Mod: CPTII,S$GLB,, | Performed by: SURGERY

## 2019-02-28 PROCEDURE — 99214 OFFICE O/P EST MOD 30 MIN: CPT | Mod: S$GLB,,, | Performed by: SURGERY

## 2019-02-28 PROCEDURE — 3008F PR BODY MASS INDEX (BMI) DOCUMENTED: ICD-10-PCS | Mod: CPTII,S$GLB,, | Performed by: SURGERY

## 2019-02-28 PROCEDURE — 3074F PR MOST RECENT SYSTOLIC BLOOD PRESSURE < 130 MM HG: ICD-10-PCS | Mod: CPTII,S$GLB,, | Performed by: SURGERY

## 2019-02-28 PROCEDURE — 3078F PR MOST RECENT DIASTOLIC BLOOD PRESSURE < 80 MM HG: ICD-10-PCS | Mod: CPTII,S$GLB,, | Performed by: SURGERY

## 2019-02-28 NOTE — LETTER
February 28, 2019        Rubén Davis MD  [address] [facility] [address]             [facility]  [address]  [place] [address]  Phone: [phone]  Fax: [phone]   Patient: Marvin Ray   MR Number: [id number]   YOB: 1960   Date of Visit: 2/28/2019       Dear Dr. Davis:    Thank you for referring Marvin Ray to me for evaluation. Below are the relevant portions of my assessment and plan of care.            If you have questions, please do not hesitate to call me. I look forward to following Marvin along with you.    Sincerely,      Mitch Chan MD           CC  No Recipients

## 2019-02-28 NOTE — PROGRESS NOTES
Mitch Chan MD [facility]                         02/28/2019    HPI:  Marvin Ray is a 58 y.o. male with   Patient Active Problem List   Diagnosis    Epiretinal membrane, both eyes    Nuclear sclerotic cataract of right eye    Pseudophakia, left eye    Ptosis, left eyelid    DM type 2 with diabetic peripheral neuropathy    HLD (hyperlipidemia)    Neovascular glaucoma of both eyes    Tophaceous gout    Essential hypertension    CAD (coronary artery disease)    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 edema, with long-term current use of insulin    Neovascular glaucoma of left eye, severe stage    Statin myopathy    Neovascular glaucoma, right eye, mild stage    Left leg cellulitis    PVD (peripheral vascular disease)    Right wrist pain    Multiple open wounds of lower leg    Hepatosplenomegaly    Non-pressure chronic ulcer of other part of left foot with fat layer exposed    Type 2 diabetes mellitus with left diabetic foot ulcer    Open wound of left foot    Cellulitis of left lower extremity    Diabetic foot infection    Other ascites    Severe malnutrition    Goals of care, counseling/discussion    Alteration in skin integrity    MDR Acinetobacter baumannii infection    Takes dietary supplements    Intertriginous dermatitis associated with moisture    Debility    Infection due to multidrug-resistant Pseudomonas aeruginosa    Subacute osteomyelitis of left foot    Ascites    being managed by PCP and specialists who is here today for evaluation of L foot wound.  Seen in hospital last mo with LLE cellulitis.  Treated with Abx.  Also had paracentesis for ascites with negative w/u per family.  Patient states location is L foot occurring for 1-2 mo, worsening foot wound although improvement in edema and erythema.  Associated signs and symptoms include pain and  edema.  Quality is aching and severity is 5/10.  Symptoms began 10/2018 spontaneously with blistering and severe edema.  Alleviating factors include wound care and elevation.  Worsening factors include pressure.    Tobacco use: denies    1/4/19: s/p LLE angioplasty and multiple subsequent OR and bedside debridements.  On IV Abx per culture results.  Glucose better controlled.  No fevers.  Receiving local wound care with Santyl.    1/14/19:  Cont to receive local wound care.  Pseudomonas in tissue and bone cultures multidrug resistant other than aminoglycoside; d/w Dr. Velez with high risk of ESRD with 6 weeks of aminoglycoside treatment.  No F/C.    1/31/19:  Pt without complaints.  Was started back on Bactrim.  Family doing wound care.    Interval history:  S/p L foot debridement 2/13/19.  Started on Meropenem.    Past Medical History:   Diagnosis Date    Arthritis     Cellulitis     CKD (chronic kidney disease), stage III     Coronary artery disease     Diabetes mellitus     Diabetic retinopathy     Diabetic ulcer of left foot     Glaucoma     Gout     Hyperlipemia     Hypertension     ICD (implantable cardioverter-defibrillator) in place 11/02/2018    Left chest    Non-pressure chronic ulcer of other part of left foot with fat layer exposed 10/23/2018    PVD (peripheral vascular disease)     Type 2 diabetes mellitus with left diabetic foot ulcer 10/29/2018    Unsteady gait     uses a wheelchair     Past Surgical History:   Procedure Laterality Date    AHMED GLAUCOMA IMPLANT Left 2011    DONE AT [facility]    AMPUTATION, TOE Left 12/5/2018    [facility]    AMPUTATION, TOES  2-5 Left 11/16/2018    [facility] by [facility] at [facility] OR    BAERVELDT GLAUCOMA IMPLANT Left 2012    WITH CATARACT EXTRACTION//DONE AT [facility]    CARDIAC CATHETERIZATION Left 05/2016    CARDIAC DEFIBRILLATOR PLACEMENT Left 11/02/2018    CATARACT EXTRACTION W/  INTRAOCULAR  LENS IMPLANT Left 2012    WITH BAERVEDT//DONE AT [facility]    CATARACT EXTRACTION W/  INTRAOCULAR LENS IMPLANT Right 09/26/2018    COMPLEX ()    CB DESTRUCTION WITH CYCLO G6 Left 02/15/2017        CYST REMOVAL      DEBRIDEMENT, FOOT  12/28/2018    [facility]formed by [facility] at [facility] OR    Exam under anesthesia, left foot debridement, left foot washout, possible left second through fifth metatarsal resection Left 12/28/2018    Performed by Mitch Chan MD at [facility] OR    Exam under anesthesia, left foot debridement, washout and all other indicated procedures Left 12/5/2018    Performed by Mitch Chan [facility] at [facility] OR    EXCISION, LESION, METATARSAL BONE  12/28/2018    Performed by Mitch Chan [facility] at [facility] OR    HEART CATH-LEFT N/A 5/6/2016    Performed by Mike Magana MD at [facility] CATH LAB    INCISION AND DRAINAGE Left 11/16/2018    Performed by Mitch Chan MD at [facility] OR    Incision and Drainage, left lower extremity debridement, washout Left 11/14/2018    Performed by Mitch Chan [facility] at [facility] OR    INSERTION, ICD GENERATOR, SINGLE CHAMBER N/A 11/2/2018    Performed by Pernell Greer MD at [facility] CATH LAB    INSERTION, IOL PROSTHESIS Right 9/26/2018    Performed by Perla Cortés MD at [facility] OR 1ST FLR    LEFT FOOT WOUND DEBRIDEMENT, WASHOUT AND ALL OTHER INDICATED PROCEDURES Left 2/13/2019    Performed by Mitch Chan MD at [facility] OR    PARACENTESIS, ABDOMINAL, REPEAT N/A 2/11/2019    Performed by [facility] Provider at [facility] OR    PHACOEMULSIFICATION, CATARACT Right 9/26/2018    Performed by Perla Cortés MD at [facility] OR 1ST FLR    Right foot surgery  10/2014    TOE AMPUTATION Right     first and second    TONSILLECTOMY      TRABECULECTOMY/G 6 LASER Left 2/15/2017    Performed by Perla Cortés MD at [facility] OR 1ST FLR     Family History   Problem Relation Age of Onset    Diabetes Mother     Heart  disease Brother     No Known Problems Father     No Known Problems Sister     No Known Problems Maternal Aunt     No Known Problems Maternal Uncle     No Known Problems Paternal Aunt     No Known Problems Paternal Uncle     No Known Problems Maternal Grandmother     No Known Problems Maternal Grandfather     No Known Problems Paternal Grandmother     No Known Problems Paternal Grandfather     Anemia Neg Hx     Arrhythmia Neg Hx     Asthma Neg Hx     Clotting disorder Neg Hx     Fainting Neg Hx     Heart attack Neg Hx     Heart failure Neg Hx     Hyperlipidemia Neg Hx     Hypertension Neg Hx     Stroke Neg Hx     Atrial Septal Defect Neg Hx     Amblyopia Neg Hx     Blindness Neg Hx     Glaucoma Neg Hx     Macular degeneration Neg Hx     Retinal detachment Neg Hx     Strabismus Neg Hx      Social History     Socioeconomic History    Marital status: Legally      Spouse name: Not on file    Number of children: Not on file    Years of education: 14    Highest education level: Not on file   Social Needs    Financial resource strain: Not on file    Food insecurity - worry: Not on file    Food insecurity - inability: Not on file    Transportation needs - medical: Not on file    Transportation needs - non-medical: Not on file   Occupational History    Not on file   Tobacco Use    Smoking status: Former Smoker     Packs/day: 1.00     Years: 3.00     Pack years: 3.00     Types: Cigarettes     Last attempt to quit: 1984     Years since quittin.6    Smokeless tobacco: Never Used   Substance and Sexual Activity    Alcohol use: Yes     Alcohol/week: 0.6 oz     Types: 1 Cans of beer per week     Comment: Occasional    Drug use: No    Sexual activity: Not Currently   Other Topics Concern    Not on file   Social History Narrative    Not on file       Current Outpatient Medications:     allopurinol (ZYLOPRIM) 300 MG tablet, Take 1 tablet (300 mg total) by mouth once  "daily., Disp: 30 tablet, Rfl: 3    aspirin 81 MG Chew, Take 81 mg by mouth once daily., Disp: , Rfl:     brimonidine 0.2% (ALPHAGAN) 0.2 % Drop, Place 1 drop into both eyes 2 (two) times daily., Disp: 10 mL, Rfl: 11    carvedilol (COREG) 3.125 MG tablet, Take 1 tablet (3.125 mg total) by mouth 2 (two) times daily., Disp: 60 tablet, Rfl: 11    coenzyme Q10 100 mg capsule, Take 100 mg by mouth every morning., Disp: , Rfl:     dorzolamide-timolol 2-0.5% (COSOPT) 22.3-6.8 mg/mL ophthalmic solution, Place 1 drop into both eyes 2 (two) times daily., Disp: 1 Bottle, Rfl: 11    ezetimibe (ZETIA) 10 mg tablet, Take 1 tablet (10 mg total) by mouth once daily., Disp: 30 tablet, Rfl: 2    fish oil-omega-3 fatty acids 300-1,000 mg capsule, Take 1 capsule by mouth 2 (two) times daily., Disp: 60 capsule, Rfl: 3    furosemide (LASIX) 40 MG tablet, Take 1.5 tablets (60 mg total) by mouth 2 (two) times daily., Disp: 90 tablet, Rfl: 11    insulin aspart U-100 (NOVOLOG) 100 unit/mL InPn pen, Use on premeal readings: 150-200=+2, 201-250=+4; 251-300=+6; 301-350=+8, over 350=+10 units, Disp: 2.7 mL, Rfl: 11    insulin degludec-liraglutide (XULTOPHY 100/3.6) 100 unit-3.6 mg /mL (3 mL) InPn, Inject 42 Units into the skin once daily., Disp: 5 Syringe, Rfl: 5    metOLazone (ZAROXOLYN) 5 MG tablet, Take 1 tablet (5 mg total) by mouth every Monday and Thursday. 30 minutes before lasix., Disp: 30 tablet, Rfl: 2    miconazole NITRATE 2 % (MICOTIN) 2 % top powder, Apply topically 2 (two) times daily., Disp: , Rfl: 0    MULTIVIT,THER IRON,CA,FA & MIN (MULTIVITAMIN) Tab, Take 1 tablet by mouth every morning. , Disp: , Rfl:     oxyCODONE-acetaminophen (PERCOCET) 5-325 mg per tablet, Take 1 tablet by mouth every 6 (six) hours as needed for Pain., Disp: 45 tablet, Rfl: 0    pen needle, diabetic 31 gauge x 1/4" Ndle, Use as directed, Disp: 100 each, Rfl: 11    senna-docusate 8.6-50 mg (PERICOLACE) 8.6-50 mg per tablet, Take 1 tablet by " mouth 2 (two) times daily., Disp: , Rfl:   No current facility-administered medications for this visit.     Facility-Administered Medications Ordered in Other Visits:     lactated ringers infusion, , Intravenous, Continuous, Tam Reynolds MD    lidocaine (PF) 10 mg/ml (1%) injection 10 mg, 1 mL, Intradermal, Once, Tam Reynolds MD    REVIEW OF SYSTEMS:  General: No fevers or chills; ENT: No sore throat; Allergy and Immunology: no persistent infections; Hematological and Lymphatic: No history of bleeding or easy bruising; Endocrine: negative; Respiratory: no cough, shortness of breath, or wheezing; Cardiovascular: no chest pain or dyspnea on exertion; Gastrointestinal: no abdominal pain/back, change in bowel habits, or bloody stools; Genito-Urinary: no dysuria, trouble voiding, or hematuria; Musculoskeletal: negative; Neurological: no TIA or stroke symptoms; Psychiatric: no nervousness, anxiety or depression.    PHYSICAL EXAM:                General appearance:  Alert, well-appearing, and in no distress.  Oriented to person, place, and time                    Neurological: Normal speech, no focal findings noted; CN II - XII grossly intact. RLE with sensation to light touch, LLE with sensation to light touch.            Musculoskeletal: Digits/nail without cyanosis/clubbing.  Strength 5/5 BLE.                    Neck: Supple, no significant adenopathy                  Chest:  Clear to auscultation, no wheezes, rales or rhonchi, symmetric air entry. No use of accessory muscles               Cardiac: Normal rate and regular rhythm, S1 and S2 normal            Abdomen: Soft, nontender, nondistended, no masses or organomegaly, no hernia     No rebound tenderness noted; bowel sounds normal     No groin adenopathy      Extremities:   2+ R femoral pulse, 2+ L femoral pulse     doppler+ R popliteal pulse, doppler+ L popliteal pulse     doppler+ R PT pulse, doppler+ L PT pulse     doppler+ R DP pulse, doppler+ L DP  pulse     1+ RLE edema, 2+ LLE edema    Skin: LLE with blistering superficial anterior lower leg wound, minimal brawny edema, +large foot wound with minimal fibrinous and improved granulation tissue, no odor, no purulence or erythema    LAB RESULTS:  No results found for: CBC  Lab Results   Component Value Date    LABPROT 11.1 02/06/2019    INR 1.1 02/06/2019     Lab Results   Component Value Date     (L) 02/18/2019    K 2.7 (LL) 02/18/2019    CL 94 (L) 02/18/2019    CO2 27 02/18/2019     (H) 02/18/2019    BUN 45 (H) 02/18/2019    CREATININE 0.9 02/18/2019    CALCIUM 8.3 (L) 02/18/2019    ANIONGAP 10 02/18/2019    EGFRNONAA >60.0 02/18/2019     Lab Results   Component Value Date    WBC 6.07 02/18/2019    RBC 3.33 (L) 02/18/2019    HGB 8.8 (L) 02/18/2019    HCT 28.5 (L) 02/18/2019    MCV 86 02/18/2019    MCH 26.4 (L) 02/18/2019    MCHC 30.9 (L) 02/18/2019    RDW 17.6 (H) 02/18/2019     02/18/2019    MPV 8.8 (L) 02/18/2019    GRAN 4.6 02/18/2019    GRAN 75.3 (H) 02/18/2019    LYMPH 0.5 (L) 02/18/2019    LYMPH 8.7 (L) 02/18/2019    MONO 0.6 02/18/2019    MONO 10.2 02/18/2019    EOS 0.3 02/18/2019    BASO 0.05 02/18/2019    EOSINOPHIL 4.3 02/18/2019    BASOPHIL 0.8 02/18/2019    DIFFMETHOD Automated 02/18/2019     .  Lab Results   Component Value Date    HGBA1C 8.3 (H) 10/30/2018       IMAGING:  All pertinent imaging has been reviewed and interpreted independently.    BLE arterial US 1/2019: No focal stenosis    IMP/PLAN:  58 y.o. male with   Patient Active Problem List   Diagnosis    Epiretinal membrane, both eyes    Nuclear sclerotic cataract of right eye    Pseudophakia, left eye    Ptosis, left eyelid    DM type 2 with diabetic peripheral neuropathy    HLD (hyperlipidemia)    Neovascular glaucoma of both eyes    Tophaceous gout    Essential hypertension    CAD (coronary artery disease)    Uncontrolled type 2 diabetes mellitus with both eyes affected by proliferative retinopathy and  macular edema, with long-term current use of insulin    Neovascular glaucoma of left eye, severe stage    Statin myopathy    Neovascular glaucoma, right eye, mild stage    Left leg cellulitis    PVD (peripheral vascular disease)    Right wrist pain    Multiple open wounds of lower leg    Hepatosplenomegaly    Non-pressure chronic ulcer of other part of left foot with fat layer exposed    Type 2 diabetes mellitus with left diabetic foot ulcer    Open wound of left foot    Cellulitis of left lower extremity    Diabetic foot infection    Other ascites    Severe malnutrition    Goals of care, counseling/discussion    Alteration in skin integrity    MDR Acinetobacter baumannii infection    Takes dietary supplements    Intertriginous dermatitis associated with moisture    Debility    Infection due to multidrug-resistant Pseudomonas aeruginosa    Subacute osteomyelitis of left foot    Ascites    being managed by PCP and specialists who is here today for evaluation of L foot wound.    -L foot wound improved s/p debridement 2/13/19, multifactorial due to diabetes, PVD and venous insufficiency with improvement in granulation tissue on Meropenem due to multidrug resistent bacteria in the past - have d/w pt and family re: risk of bacteremia and progression of infection despite improvement in perfusion and wound bed; amputation options are limited due to venous stasis changes to below left knee leg - Pt states he desires to continue Abx and local wound care; does not desire amputation at this time  -He would benefit from continued aggressive wound care - will refer to Dr. Diaz for comprehensive management/evaluation for coverage as wound is granulating more  -Cont ID rec Abx regimen  -Recommend BLE Xeroform and dry kerlix wraps twice daily with elevation of LLE above level of the heart  -Low sodium diet  -Strict glycemic control  -Cont Abx per ID recs  -RTC 2-3 weeks for wound recheck    I spent 20  minutes evaluating this patient and greater than 50% of the time was spent counseling, coordinator care and discussing the plan of care.  All questions were answered and patient stated understanding with agreement with the above treatment plan.    Mitch Chan MD [facility]  Vascular and Endovascular Surgery

## 2019-02-28 NOTE — PATIENT INSTRUCTIONS
Wound Care  Taking proper care of your wound will help it heal. Your healthcare provider may show you how to clean and dress the wound. He or she will also explain how to tell if the wound is healing normally. If you are unsure of how to take care of the wound, be sure to clarify what dressing to use and how often you should change the bandages. Here are the basic steps.     A wound that's not healing normally may be dark in color or have white streaks.   Wash your hands  Tips for washing your hands include:  · Use liquid soap and lather for 2 minutes. Scrub between your fingers and under your nails.  · Rinse with warm water, keeping your fingers pointing down.  · Use a paper towel to dry your hands and to turn off the faucet.  Remove the used dressing  Here are suggestions for removing the dressing:  · If dressing changes cause you pain, be sure to take your pain medicine as prescribed by your healthcare provider 30 minutes before dressing changes.  · Set up your supplies.  · Put on disposable gloves if youre dressing a wound for someone else or your wound is infected.  · Loosen the tape by pulling gently toward the wound.  · Gently take off the old dressing. If the dressing is stuck to the wound, moisten it with saline (if available) or clean water.  · If you have a drain or tube in the wound, be careful not to pull on it.  · Remove the dressing 1 layer at a time and put it in a plastic bag.  · Remove your gloves.  Inspect and dress the wound  Check the wound carefully:  · Each time you change the dressing, inspect the wound carefully to be sure its healing normally by making sure your wound appears to be pink and moist, and is free of infection.    · Wash your hands again. Put on a new pair of gloves.  · Clean and dress the wound as directed by your healthcare provider or nurse. Do not put anything in the wound that is not prescribed or directed by your healthcare provider. If you have a drain or tube, be  careful not to pull on it. Make sure to secure the drain or tube as well.  · Put all supplies in a plastic bag. Seal the bag and put it in the trash.  · Be sure to wash your hands again.  Call your healthcare provider  Call your healthcare provider if you see any of the following signs of a problem:  · Bleeding that soaks the dressing  · Pink fluid weeping from the wound  · Increased drainage or drainage that is yellow, yellow-green, or foul-smelling  · Increased swelling or pain, or redness or swelling in the skin around the wound  · A change in the color of the wound, or if streaks develop in a direction away from the wound  · The area between any stitches opens up  · An increase in the size of the wound  · A fever of 100.4°F (38°C) or higher, or as directed by your healthcare provider  · Chills, increased fatigue, or a loss of appetite      Date Last Reviewed: 7/30/2015  © 1541-7504 [employer]. [address] [place] [address], [place] [address]. All rights reserved. This information is not intended as a substitute for professional medical care. Always follow your healthcare professional's instructions.

## 2019-02-28 NOTE — TELEPHONE ENCOUNTER
----- Message from Rubén Davis MD sent at 2/28/2019 11:38 AM CST -----  Contact: Chloé-Sister  Orders placed    Thanks  Dr. Davis     ----- Message -----  From: Zainab Taylor LPN  Sent: 2/28/2019  10:34 AM  To: Rubén Davis MD    Please advise. Patient's sister states there is supposed to be a standing order for the paracentesis. Please advise.   ----- Message -----  From: Romina Borrero  Sent: 2/28/2019   9:37 AM  To: Susan Luevano called to check the status on a standing order for a paracentesis (abdominal). Please call to clarify and advise at  [phone]

## 2019-03-01 ENCOUNTER — HOSPITAL ENCOUNTER (OUTPATIENT)
Facility: HOSPITAL | Age: 59
Discharge: HOME | End: 2019-03-01
Attending: RADIOLOGY | Admitting: RADIOLOGY
Payer: COMMERCIAL

## 2019-03-01 VITALS
OXYGEN SATURATION: 98 % | HEART RATE: 96 BPM | SYSTOLIC BLOOD PRESSURE: 137 MMHG | TEMPERATURE: 98 F | DIASTOLIC BLOOD PRESSURE: 56 MMHG | RESPIRATION RATE: 18 BRPM

## 2019-03-01 DIAGNOSIS — R18.8 OTHER ASCITES: ICD-10-CM

## 2019-03-01 PROCEDURE — P9047 ALBUMIN (HUMAN), 25%, 50ML: HCPCS | Mod: JG | Performed by: RADIOLOGY

## 2019-03-01 PROCEDURE — 63600175 PHARM REV CODE 636 W HCPCS: Performed by: RADIOLOGY

## 2019-03-01 RX ORDER — ALBUMIN HUMAN 250 G/1000ML
65 SOLUTION INTRAVENOUS ONCE
Status: COMPLETED | OUTPATIENT
Start: 2019-03-01 | End: 2019-03-01

## 2019-03-01 RX ORDER — HEPARIN SODIUM,PORCINE/PF 10 UNIT/ML
50 SYRINGE (ML) INTRAVENOUS ONCE
Status: DISCONTINUED | OUTPATIENT
Start: 2019-03-01 | End: 2019-03-01

## 2019-03-01 RX ORDER — HEPARIN SODIUM (PORCINE) LOCK FLUSH IV SOLN 100 UNIT/ML 100 UNIT/ML
200 SOLUTION INTRAVENOUS
Status: DISCONTINUED | OUTPATIENT
Start: 2019-03-01 | End: 2019-03-01

## 2019-03-01 RX ORDER — HEPARIN SODIUM (PORCINE) LOCK FLUSH IV SOLN 100 UNIT/ML 100 UNIT/ML
100 SOLUTION INTRAVENOUS ONCE
Status: COMPLETED | OUTPATIENT
Start: 2019-03-01 | End: 2019-03-01

## 2019-03-01 RX ADMIN — Medication 100 UNITS: at 06:03

## 2019-03-01 RX ADMIN — ALBUMIN HUMAN 65 G: 0.25 SOLUTION INTRAVENOUS at 04:03

## 2019-03-01 NOTE — NURSING
Pt tolerated procedure well with no s/s of complications. 10,400 ml of fluid pulled from Abdomen. VSS throughout procedure. Report called to JILLIAN Patricio

## 2019-03-01 NOTE — PROCEDURES
Interventional Radiology Post-Procedure Note    Pre Op Diagnosis: Ascites  Post Op Diagnosis: Same    Procedure: Paracentesis    Procedure performed by: Vernell    Written Informed Consent Obtained: Yes  Specimen Sent: None  Estimated Blood Loss: Minimal    Findings:   Large ascites. 10.4 L cloudy yellow fluid removed from the RLQ.    No immediate complications. Patient tolerated procedure well. Please see full dictated procedure report for additional details.      Xiang Matt MD  [facility]  Pager [phone]

## 2019-03-01 NOTE — H&P
Interventional Radiology Pre-Procedure History & Physical      Chief Complaint/Reason for Referral: Ascites    History of Present Illness:  Marvin Ray is a 58 y.o. male with the PMH listed below who presents with recurrent ascites. Has required high-volume paracenteses in the past. Repeat paracentesis requested.    Past Medical History:   Diagnosis Date    Arthritis     Cellulitis     CKD (chronic kidney disease), stage III     Coronary artery disease     Diabetes mellitus     Diabetic retinopathy     Diabetic ulcer of left foot     Glaucoma     Gout     Hyperlipemia     Hypertension     ICD (implantable cardioverter-defibrillator) in place 11/02/2018    Left chest    Non-pressure chronic ulcer of other part of left foot with fat layer exposed 10/23/2018    PVD (peripheral vascular disease)     Type 2 diabetes mellitus with left diabetic foot ulcer 10/29/2018    Unsteady gait     uses a wheelchair     Past Surgical History:   Procedure Laterality Date    AHMED GLAUCOMA IMPLANT Left 2011    DONE AT [facility]    AMPUTATION, TOE Left 12/5/2018    Performed by Mitch Chan MD at [facility] OR    AMPUTATION, TOES  2-5 Left 11/16/2018    Performed by [facility] Chan [facility] at [facility] OR    BAERVELDT GLAUCOMA IMPLANT Left 2012    WITH CATARACT EXTRACTION//DONE AT [facility]    CARDIAC CATHETERIZATION Left 05/2016    CARDIAC DEFIBRILLATOR PLACEMENT Left 11/02/2018    CATARACT EXTRACTION W/  INTRAOCULAR LENS IMPLANT Left 2012    WITH BAERVEDT//DONE AT [facility]    CATARACT EXTRACTION W/  INTRAOCULAR LENS IMPLANT Right 09/26/2018    COMPLEX ()    CB DESTRUCTION WITH CYCLO G6 Left 02/15/2017        CYST REMOVAL      DEBRIDEMENT, FOOT  12/28/2018    Performed by [facility] Chan MD at [facility] OR    Exam under anesthesia, left foot debridement, left foot washout, possible left second through fifth metatarsal resection Left 12/28/2018    Performed by [facility] MD at  [facility] OR    Exam under anesthesia, left foot debridement, washout and all other indicated procedures Left 12/5/2018    Performed by [facility] at [facility] OR    EXCISION, LESION, METATARSAL BONE  12/28/2018    Performed by [facility] MD at [facility] OR    HEART CATH-LEFT N/A 5/6/2016    Performed by [facility]ana MD at [facility] CATH LAB    INCISION AND DRAINAGE Left 11/16/2018    Performed by [facility] Chan [facility] at [facility] OR    Incision and Drainage, left lower extremity debridement, washout Left 11/14/2018    Performed by [facility] Chan [facility] at [facility] OR    INSERTION, ICD GENERATOR, SINGLE CHAMBER N/A 11/2/2018    Performed by Pernell Greer MD at [facility] CATH LAB    INSERTION, IOL PROSTHESIS Right 9/26/2018    Performed by Perla Cortés MD at [facility] OR [facility] FLR    LEFT FOOT WOUND DEBRIDEMENT, WASHOUT AND ALL OTHER INDICATED PROCEDURES Left 2/13/2019    Performed by [facility] at [facility] OR    PARACENTESIS, ABDOMINAL, REPEAT N/A 2/11/2019    Performed by [facility] at [facility] OR    PHACOEMULSIFICATION, CATARACT Right 9/26/2018    Performed by Perla Cort[facility] at [facility] OR [facility]    Right foot surgery  10/2014    TOE AMPUTATION Right     first and second    TONSILLECTOMY      TRABECULECTOMY/G 6 LASER Left 2/15/2017    Performed by Perla Cort[facility] MD at [facility] OR [facility] FLR       Allergies:   Review of patient's allergies indicates:   Allergen Reactions    Statins-hmg-coa reductase inhibitors      Generalized Pain    Onglyza [saxagliptin]     Penicillins Rash        Home Meds:   Prior to Admission medications    Medication Sig Start Date End Date Taking? Authorizing Provider   allopurinol (ZYLOPRIM) 300 MG tablet Take 1 tablet (300 mg total) by mouth once daily. 1/23/19   Sara Ching MD   aspirin 81 MG Chew Take 81 mg by mouth once daily.    Historical Provider, MD   brimonidine 0.2% (ALPHAGAN) 0.2 % Drop Place 1 drop into both eyes 2 (two) times  "daily. 2/8/19   Perla Cortés MD   carvedilol (COREG) 3.125 MG tablet Take 1 tablet (3.125 mg total) by mouth 2 (two) times daily. 1/23/19 1/23/20  Sara Ching MD   coenzyme Q10 100 mg capsule Take 100 mg by mouth every morning.    Historical Provider, MD   dorzolamide-timolol 2-0.5% (COSOPT) 22.3-6.8 mg/mL ophthalmic solution Place 1 drop into both eyes 2 (two) times daily. 2/8/19 2/8/20  Perla Cortés MD   ezetimibe (ZETIA) 10 mg tablet Take 1 tablet (10 mg total) by mouth once daily. 9/7/18 9/7/19  Sheryl Madison NP   fish oil-omega-3 fatty acids 300-1,000 mg capsule Take 1 capsule by mouth 2 (two) times daily. 1/23/19   Sara Ching MD   furosemide (LASIX) 40 MG tablet Take 1.5 tablets (60 mg total) by mouth 2 (two) times daily. 1/23/19 1/23/20  Sara Ching MD   insulin aspart U-100 (NOVOLOG) 100 unit/mL InPn pen Use on premeal readings: 150-200=+2, 201-250=+4; 251-300=+6; 301-350=+8, over 350=+10 units 1/31/19   Sheryl Madison NP   insulin degludec-liraglutide (XULTOPHY 100/3.6) 100 unit-3.6 mg /mL (3 mL) InPn Inject 42 Units into the skin once daily. 2/6/19   Sheryl Madison NP   metOLazone (ZAROXOLYN) 5 MG tablet Take 1 tablet (5 mg total) by mouth every Monday and Thursday. 30 minutes before lasix. 1/24/19   Sara Ching MD   miconazole NITRATE 2 % (MICOTIN) 2 % top powder Apply topically 2 (two) times daily. 12/19/18   Kirsten Trinh MD   MULTIVIT,THER IRON,CA,FA & MIN (MULTIVITAMIN) Tab Take 1 tablet by mouth every morning.     Historical Provider, MD   oxyCODONE-acetaminophen (PERCOCET) 5-325 mg per tablet Take 1 tablet by mouth every 6 (six) hours as needed for Pain. 1/24/19   Keyana Haider, NP   pen needle, diabetic 31 gauge x 1/4" Ndle Use as directed 8/11/16   Mike Bass MD   senna-docusate 8.6-50 mg (PERICOLACE) 8.6-50 mg per tablet Take 1 tablet by mouth 2 (two) times daily. 12/19/18   Kirsten Trinh MD       Anticoagulation/Antiplatelet Meds: no " anticoagulation    Review of Systems:   Hematological: no known coagulopathies  Respiratory: no shortness of breath  Cardiovascular: no chest pain  Gastrointestinal: no abdominal pain  Genitourinary: no dysuria  Musculoskeletal: negative  Neurological: no TIA or stroke symptoms     Physical Exam:       General: NAD  HEENT: Normocephalic, sclera anicteric, oropharynx clear  Neck: Supple, no palpable lymphadenopathy  Heart: RRR  Lungs: Symmetric excursions, breathing unlabored  Abd: Markedly distended, soft, NT, positive fluid wave  Extremities: BORREGO  Neuro: Gross nonfocal    Laboratory:  Lab Results   Component Value Date    INR 1.1 02/06/2019       Lab Results   Component Value Date    WBC 6.07 02/18/2019    HGB 8.8 (L) 02/18/2019    HCT 28.5 (L) 02/18/2019    MCV 86 02/18/2019     02/18/2019      Lab Results   Component Value Date     (H) 02/18/2019     (L) 02/18/2019    K 2.7 (LL) 02/18/2019    CL 94 (L) 02/18/2019    CO2 27 02/18/2019    BUN 45 (H) 02/18/2019    CREATININE 0.9 02/18/2019    CALCIUM 8.3 (L) 02/18/2019    MG 2.4 02/06/2019    ALT 22 02/18/2019    AST 21 02/18/2019    ALBUMIN 2.0 (L) 02/18/2019    BILITOT 0.3 02/18/2019     Assessment/Plan:  58 y.o. male with large ascites for high-volume paracentesis today.    Sedation: None    Risks (including, but not limited to, pain, bleeding, infection, damage to nearby structures, treatment failure, and the need for additional procedures), benefits, and alternatives were discussed with the patient. All questions were answered to the best of my abilities. The patient wishes to proceed with paracentesis. Written informed consent was obtained.      Xiang Matt MD  [facility]  Pager [phone]

## 2019-03-01 NOTE — DISCHARGE INSTRUCTIONS
BATHING:  ? You may shower tomorrow.  DRESSING:  ? Remove dressing tomorrow.        ACTIVITY LEVEL: If you have received sedation or an anesthetic, you may feel sleepy for several hours. Rest until you are more awake. Gradually resume your normal activities    Do not drive, drink alcohol, or sign legal documents for 24 hours, or if taking narcotic pain medication.      DIET: You may resume your home diet. If nausea is present, increase your diet gradually with fluids and bland foods.    Medications: Pain medication should be taken only if needed and as directed. If antibiotics are prescribed, the medication should be taken until completed. You will be given an updated list of you medications.  ? No driving, alcoholic beverages or signing legal documents for next 24 hours if you have had sedation, or while taking pain medication    CALL THE DOCTOR:   For any obvious bleeding (some dried blood over the incision is normal).     Redness, swelling, foul smell around incision or fever over 101.  Shortness of breath.  Persistent pain or nausea not relieved by medication.  Call  [phone]     to speak with an Interventional Radiologist    If any unusual problems or difficulties occur contact your doctor. If you cannot contact your doctor but feel your signs and symptoms warrant a physicians attention return to the emergency room.    Fall Prevention  Millions of people fall every year and injure themselves. You may have had anesthesia or sedation which may increase your risk of falling. You may have health issues that put you at an increased risk of falling.     Here are ways to reduce your risk of falling.  ·   · Make your home safe by keeping walkways clear of objects you may trip over.  · Use non-slip pads under rugs. Do not use area rugs or small throw rugs.  · Use non-slip mats in bathtubs and showers.  · Install handrails and lights on staircases.  · Do not walk in poorly lit areas.  · Do not stand on chairs or wobbly  ladders.  · Use caution when reaching overhead or looking upward. This position can cause a loss of balance.  · Be sure your shoes fit properly, have non-slip bottoms and are in good condition.   · Wear shoes both inside and out. Avoid going barefoot or wearing slippers.  · Be cautious when going up and down stairs, curbs, and when walking on uneven sidewalks.  · If your balance is poor, consider using a cane or walker.  · If your fall was related to alcohol use, stop or limit alcohol intake.   · If your fall was related to use of sleeping medicines, talk to your doctor about this. You may need to reduce your dosage at bedtime if you awaken during the night to go to the bathroom.    · To reduce the need for nighttime bathroom trips:  ¨ Avoid drinking fluids for several hours before going to bed  ¨ Empty your bladder before going to bed  ¨ Men can keep a urinal at the bedside  · Stay as active as you can. Balance, flexibility, strength, and endurance all come from exercise. They all play a role in preventing falls. Ask your healthcare provider which types of activity are right for you.  · Get your vision checked on a regular basis.  · If you have pets, know where they are before you stand up or walk so you don't trip over them.  · Use night lights.

## 2019-03-02 LAB
ACID FAST MOD KINY STN SPEC: NORMAL
ACID FAST MOD KINY STN SPEC: NORMAL
MYCOBACTERIUM SPEC QL CULT: NORMAL
MYCOBACTERIUM SPEC QL CULT: NORMAL

## 2019-03-02 NOTE — DISCHARGE SUMMARY
Interventional Radiology Discharge Summary      Hospital Course: No complications    Admit Date: 3/1/2019  Discharge Date: 3/1/2019    Instructions Given to Patient: Yes  Diet: Resume prior diet  Activity: Activity as tolerated    Description of Condition on Discharge: Stable  Vital Signs (Most Recent): Temp: 98.1 °F (36.7 °C) (03/01/19 1617)  Pulse: 96 (03/01/19 1815)  Resp: 18 (03/01/19 1815)  BP: (!) 137/56 (03/01/19 1815)  SpO2: 98 % (03/01/19 1815)    Discharge Disposition: Home    Discharge Diagnosis: Ascites s/p large volume paracentesis, albumin given     Follow-up: With referring provider      Xiang Matt MD  [facility]  Pager [phone]

## 2019-03-07 ENCOUNTER — DOCUMENTATION ONLY (OUTPATIENT)
Dept: INFECTIOUS DISEASES | Facility: CLINIC | Age: 59
End: 2019-03-07

## 2019-03-07 NOTE — PROGRESS NOTES
Labs from MARCH 4, 2019 reviewed    Potassium=4.0  Creatinine=1.2  Ast=11  Alt=9  Wbc=6.9  Hemoglobulin=9.6  Platelets=313  CRP=27.8    Patient is on high dose ceftolozane-tazobactam IV

## 2019-03-09 ENCOUNTER — NURSE TRIAGE (OUTPATIENT)
Dept: ADMINISTRATIVE | Facility: CLINIC | Age: 59
End: 2019-03-09

## 2019-03-10 ENCOUNTER — HOSPITAL ENCOUNTER (INPATIENT)
Facility: HOSPITAL | Age: 59
LOS: 1 days | Discharge: HOME | DRG: 291 | End: 2019-03-12
Attending: EMERGENCY MEDICINE | Admitting: EMERGENCY MEDICINE
Payer: COMMERCIAL

## 2019-03-10 DIAGNOSIS — R14.0 ABDOMINAL DISTENTION: ICD-10-CM

## 2019-03-10 DIAGNOSIS — R06.02 SHORTNESS OF BREATH: ICD-10-CM

## 2019-03-10 DIAGNOSIS — I50.9 ACUTE ON CHRONIC CONGESTIVE HEART FAILURE, UNSPECIFIED HEART FAILURE TYPE: Primary | ICD-10-CM

## 2019-03-10 PROCEDURE — 85730 THROMBOPLASTIN TIME PARTIAL: CPT

## 2019-03-10 PROCEDURE — 96372 THER/PROPH/DIAG INJ SC/IM: CPT

## 2019-03-10 PROCEDURE — 96365 THER/PROPH/DIAG IV INF INIT: CPT

## 2019-03-10 PROCEDURE — 96376 TX/PRO/DX INJ SAME DRUG ADON: CPT

## 2019-03-10 PROCEDURE — 99285 EMERGENCY DEPT VISIT HI MDM: CPT | Mod: 25

## 2019-03-10 PROCEDURE — 85610 PROTHROMBIN TIME: CPT

## 2019-03-10 PROCEDURE — 96375 TX/PRO/DX INJ NEW DRUG ADDON: CPT

## 2019-03-10 PROCEDURE — 82962 GLUCOSE BLOOD TEST: CPT

## 2019-03-10 PROCEDURE — 83880 ASSAY OF NATRIURETIC PEPTIDE: CPT

## 2019-03-10 PROCEDURE — 99285 EMERGENCY DEPT VISIT HI MDM: CPT | Mod: ,,, | Performed by: EMERGENCY MEDICINE

## 2019-03-10 PROCEDURE — 99285 PR EMERGENCY DEPT VISIT,LEVEL V: ICD-10-PCS | Mod: ,,, | Performed by: EMERGENCY MEDICINE

## 2019-03-10 PROCEDURE — 80053 COMPREHEN METABOLIC PANEL: CPT

## 2019-03-10 PROCEDURE — 85025 COMPLETE CBC W/AUTO DIFF WBC: CPT

## 2019-03-10 RX ORDER — FUROSEMIDE 10 MG/ML
60 INJECTION INTRAMUSCULAR; INTRAVENOUS
Status: DISCONTINUED | OUTPATIENT
Start: 2019-03-11 | End: 2019-03-10

## 2019-03-11 PROBLEM — I50.9 ACUTE ON CHRONIC CONGESTIVE HEART FAILURE: Status: ACTIVE | Noted: 2019-03-11

## 2019-03-11 PROBLEM — N18.30 STAGE 3 CHRONIC KIDNEY DISEASE: Status: ACTIVE | Noted: 2019-03-11

## 2019-03-11 PROBLEM — I50.43 ACUTE ON CHRONIC COMBINED SYSTOLIC AND DIASTOLIC CONGESTIVE HEART FAILURE: Status: ACTIVE | Noted: 2019-03-11

## 2019-03-11 LAB
ALBUMIN FLD-MCNC: 1.4 G/DL
ALBUMIN SERPL BCP-MCNC: 2.1 G/DL
ALBUMIN SERPL BCP-MCNC: 2.1 G/DL
ALP SERPL-CCNC: 96 U/L
ALP SERPL-CCNC: 96 U/L
ALT SERPL W/O P-5'-P-CCNC: 12 U/L
ALT SERPL W/O P-5'-P-CCNC: 12 U/L
ANION GAP SERPL CALC-SCNC: 11 MMOL/L
ANION GAP SERPL CALC-SCNC: 9 MMOL/L
ANION GAP SERPL CALC-SCNC: 9 MMOL/L
APPEARANCE FLD: NORMAL
APTT BLDCRRT: 25.1 SEC
AST SERPL-CCNC: 15 U/L
AST SERPL-CCNC: 20 U/L
BACTERIA #/AREA URNS AUTO: ABNORMAL /HPF
BASOPHILS # BLD AUTO: 0.06 K/UL
BASOPHILS # BLD AUTO: 0.08 K/UL
BASOPHILS # BLD AUTO: 0.08 K/UL
BASOPHILS NFR BLD: 1 %
BILIRUB SERPL-MCNC: 0.3 MG/DL
BILIRUB SERPL-MCNC: 0.3 MG/DL
BILIRUB UR QL STRIP: NEGATIVE
BNP SERPL-MCNC: 2895 PG/ML
BODY FLD TYPE: NORMAL
BODY FLUID SOURCE, LDH: NORMAL
BUN SERPL-MCNC: 70 MG/DL
BUN SERPL-MCNC: 72 MG/DL
BUN SERPL-MCNC: 74 MG/DL
CALCIUM SERPL-MCNC: 8.6 MG/DL
CALCIUM SERPL-MCNC: 8.9 MG/DL
CALCIUM SERPL-MCNC: 8.9 MG/DL
CHLORIDE SERPL-SCNC: 97 MMOL/L
CHLORIDE SERPL-SCNC: 97 MMOL/L
CHLORIDE SERPL-SCNC: 98 MMOL/L
CLARITY UR REFRACT.AUTO: CLEAR
CO2 SERPL-SCNC: 27 MMOL/L
CO2 SERPL-SCNC: 28 MMOL/L
CO2 SERPL-SCNC: 28 MMOL/L
COLOR FLD: YELLOW
COLOR UR AUTO: YELLOW
CREAT SERPL-MCNC: 1.3 MG/DL
CREAT SERPL-MCNC: 1.5 MG/DL
CREAT SERPL-MCNC: 1.6 MG/DL
CREAT UR-MCNC: 22 MG/DL
DIFFERENTIAL METHOD: ABNORMAL
EOSINOPHIL # BLD AUTO: 0.2 K/UL
EOSINOPHIL # BLD AUTO: 0.3 K/UL
EOSINOPHIL # BLD AUTO: 0.3 K/UL
EOSINOPHIL NFR BLD: 3.1 %
EOSINOPHIL URNS QL WRIGHT STN: NORMAL
ERYTHROCYTE [DISTWIDTH] IN BLOOD BY AUTOMATED COUNT: 20.2 %
ERYTHROCYTE [DISTWIDTH] IN BLOOD BY AUTOMATED COUNT: 20.2 %
ERYTHROCYTE [DISTWIDTH] IN BLOOD BY AUTOMATED COUNT: 20.4 %
EST. GFR  (AFRICAN AMERICAN): 53.3 ML/MIN/1.73 M^2
EST. GFR  (AFRICAN AMERICAN): 57.7 ML/MIN/1.73 M^2
EST. GFR  (AFRICAN AMERICAN): >60 ML/MIN/1.73 M^2
EST. GFR  (NON AFRICAN AMERICAN): 46.1 ML/MIN/1.73 M^2
EST. GFR  (NON AFRICAN AMERICAN): 49.9 ML/MIN/1.73 M^2
EST. GFR  (NON AFRICAN AMERICAN): 59.3 ML/MIN/1.73 M^2
ESTIMATED AVG GLUCOSE: 146 MG/DL
GLUCOSE FLD-MCNC: 130 MG/DL
GLUCOSE SERPL-MCNC: 124 MG/DL
GLUCOSE SERPL-MCNC: 143 MG/DL
GLUCOSE SERPL-MCNC: 153 MG/DL
GLUCOSE UR QL STRIP: NEGATIVE
HBA1C MFR BLD HPLC: 6.7 %
HCT VFR BLD AUTO: 28 %
HCT VFR BLD AUTO: 30.6 %
HCT VFR BLD AUTO: 31.1 %
HGB BLD-MCNC: 8.8 G/DL
HGB BLD-MCNC: 9.3 G/DL
HGB BLD-MCNC: 9.6 G/DL
HGB UR QL STRIP: NEGATIVE
HYALINE CASTS UR QL AUTO: 6 /LPF
IMM GRANULOCYTES # BLD AUTO: 0.02 K/UL
IMM GRANULOCYTES # BLD AUTO: 0.02 K/UL
IMM GRANULOCYTES # BLD AUTO: 0.04 K/UL
IMM GRANULOCYTES NFR BLD AUTO: 0.3 %
IMM GRANULOCYTES NFR BLD AUTO: 0.3 %
IMM GRANULOCYTES NFR BLD AUTO: 0.5 %
INR PPP: 1
KETONES UR QL STRIP: ABNORMAL
LDH FLD L TO P-CCNC: 59 U/L
LEUKOCYTE ESTERASE UR QL STRIP: NEGATIVE
LYMPHOCYTES # BLD AUTO: 0.4 K/UL
LYMPHOCYTES # BLD AUTO: 0.5 K/UL
LYMPHOCYTES # BLD AUTO: 0.5 K/UL
LYMPHOCYTES NFR BLD: 6.5 %
LYMPHOCYTES NFR BLD: 6.6 %
LYMPHOCYTES NFR BLD: 7.2 %
LYMPHOCYTES NFR FLD MANUAL: 17 %
MAGNESIUM SERPL-MCNC: 2 MG/DL
MAGNESIUM SERPL-MCNC: 2 MG/DL
MCH RBC QN AUTO: 26.1 PG
MCH RBC QN AUTO: 26.8 PG
MCH RBC QN AUTO: 27 PG
MCHC RBC AUTO-ENTMCNC: 30.4 G/DL
MCHC RBC AUTO-ENTMCNC: 30.9 G/DL
MCHC RBC AUTO-ENTMCNC: 31.4 G/DL
MCV RBC AUTO: 85 FL
MCV RBC AUTO: 86 FL
MCV RBC AUTO: 88 FL
MESOTHL CELL NFR FLD MANUAL: 2 %
MICROSCOPIC COMMENT: ABNORMAL
MONOCYTES # BLD AUTO: 0.7 K/UL
MONOCYTES # BLD AUTO: 0.8 K/UL
MONOCYTES # BLD AUTO: 0.9 K/UL
MONOCYTES NFR BLD: 11.3 %
MONOCYTES NFR BLD: 12.1 %
MONOCYTES NFR BLD: 9.9 %
MONOS+MACROS NFR FLD MANUAL: 59 %
NEUTROPHILS # BLD AUTO: 4.7 K/UL
NEUTROPHILS # BLD AUTO: 6.2 K/UL
NEUTROPHILS # BLD AUTO: 6.3 K/UL
NEUTROPHILS NFR BLD: 76.3 %
NEUTROPHILS NFR BLD: 77.8 %
NEUTROPHILS NFR BLD: 78.9 %
NEUTROPHILS NFR FLD MANUAL: 22 %
NITRITE UR QL STRIP: NEGATIVE
NRBC BLD-RTO: 0 /100 WBC
PH UR STRIP: 5 [PH] (ref 5–8)
PHOSPHATE SERPL-MCNC: 5 MG/DL
PLATELET # BLD AUTO: 220 K/UL
PLATELET # BLD AUTO: 248 K/UL
PLATELET # BLD AUTO: 255 K/UL
PMV BLD AUTO: 9.1 FL
PMV BLD AUTO: 9.2 FL
PMV BLD AUTO: 9.4 FL
POCT GLUCOSE: 125 MG/DL (ref 70–110)
POCT GLUCOSE: 127 MG/DL (ref 70–110)
POCT GLUCOSE: 157 MG/DL (ref 70–110)
POCT GLUCOSE: 242 MG/DL (ref 70–110)
POTASSIUM SERPL-SCNC: 3.1 MMOL/L
POTASSIUM SERPL-SCNC: 3.2 MMOL/L
POTASSIUM SERPL-SCNC: 3.4 MMOL/L
PROT FLD-MCNC: 3.4 G/DL
PROT SERPL-MCNC: 6.7 G/DL
PROT SERPL-MCNC: 6.7 G/DL
PROT UR QL STRIP: NEGATIVE
PROTHROMBIN TIME: 10.7 SEC
RBC # BLD AUTO: 3.28 M/UL
RBC # BLD AUTO: 3.55 M/UL
RBC # BLD AUTO: 3.57 M/UL
RBC #/AREA URNS AUTO: 1 /HPF (ref 0–4)
SODIUM SERPL-SCNC: 133 MMOL/L
SODIUM SERPL-SCNC: 135 MMOL/L
SODIUM SERPL-SCNC: 136 MMOL/L
SP GR UR STRIP: 1.02 (ref 1–1.03)
SPECIMEN SOURCE: NORMAL
SQUAMOUS #/AREA URNS AUTO: 0 /HPF
TROPONIN I SERPL DL<=0.01 NG/ML-MCNC: 0.01 NG/ML
URN SPEC COLLECT METH UR: ABNORMAL
UUN UR-MCNC: 210 MG/DL
WBC # BLD AUTO: 6.13 K/UL
WBC # BLD AUTO: 7.98 K/UL
WBC # BLD AUTO: 7.99 K/UL
WBC # FLD: 151 /CU MM
WBC #/AREA URNS AUTO: 1 /HPF (ref 0–5)

## 2019-03-11 PROCEDURE — 82042 OTHER SOURCE ALBUMIN QUAN EA: CPT

## 2019-03-11 PROCEDURE — 80053 COMPREHEN METABOLIC PANEL: CPT

## 2019-03-11 PROCEDURE — 83036 HEMOGLOBIN GLYCOSYLATED A1C: CPT

## 2019-03-11 PROCEDURE — 82945 GLUCOSE OTHER FLUID: CPT

## 2019-03-11 PROCEDURE — 99223 PR INITIAL HOSPITAL CARE,LEVL III: ICD-10-PCS | Mod: ,,, | Performed by: INTERNAL MEDICINE

## 2019-03-11 PROCEDURE — 63600175 PHARM REV CODE 636 W HCPCS: Mod: JG | Performed by: STUDENT IN AN ORGANIZED HEALTH CARE EDUCATION/TRAINING PROGRAM

## 2019-03-11 PROCEDURE — 89051 BODY FLUID CELL COUNT: CPT

## 2019-03-11 PROCEDURE — 87070 CULTURE OTHR SPECIMN AEROBIC: CPT

## 2019-03-11 PROCEDURE — 84540 ASSAY OF URINE/UREA-N: CPT

## 2019-03-11 PROCEDURE — 25000003 PHARM REV CODE 250: Performed by: STUDENT IN AN ORGANIZED HEALTH CARE EDUCATION/TRAINING PROGRAM

## 2019-03-11 PROCEDURE — 63600175 PHARM REV CODE 636 W HCPCS: Performed by: STUDENT IN AN ORGANIZED HEALTH CARE EDUCATION/TRAINING PROGRAM

## 2019-03-11 PROCEDURE — 25000003 PHARM REV CODE 250: Performed by: INTERNAL MEDICINE

## 2019-03-11 PROCEDURE — 83615 LACTATE (LD) (LDH) ENZYME: CPT

## 2019-03-11 PROCEDURE — 82570 ASSAY OF URINE CREATININE: CPT

## 2019-03-11 PROCEDURE — 63600175 PHARM REV CODE 636 W HCPCS: Mod: JG | Performed by: INTERNAL MEDICINE

## 2019-03-11 PROCEDURE — 99223 1ST HOSP IP/OBS HIGH 75: CPT | Mod: ,,, | Performed by: INTERNAL MEDICINE

## 2019-03-11 PROCEDURE — 87205 SMEAR GRAM STAIN: CPT

## 2019-03-11 PROCEDURE — 81001 URINALYSIS AUTO W/SCOPE: CPT

## 2019-03-11 PROCEDURE — 11000001 HC ACUTE MED/SURG PRIVATE ROOM

## 2019-03-11 PROCEDURE — 93010 ELECTROCARDIOGRAM REPORT: CPT | Mod: ,,, | Performed by: INTERNAL MEDICINE

## 2019-03-11 PROCEDURE — 84100 ASSAY OF PHOSPHORUS: CPT

## 2019-03-11 PROCEDURE — 80048 BASIC METABOLIC PNL TOTAL CA: CPT

## 2019-03-11 PROCEDURE — 84157 ASSAY OF PROTEIN OTHER: CPT

## 2019-03-11 PROCEDURE — 25000003 PHARM REV CODE 250

## 2019-03-11 PROCEDURE — P9047 ALBUMIN (HUMAN), 25%, 50ML: HCPCS | Mod: JG | Performed by: STUDENT IN AN ORGANIZED HEALTH CARE EDUCATION/TRAINING PROGRAM

## 2019-03-11 PROCEDURE — 85025 COMPLETE CBC W/AUTO DIFF WBC: CPT

## 2019-03-11 PROCEDURE — S5571 INSULIN DISPOS PEN 3 ML: HCPCS | Performed by: STUDENT IN AN ORGANIZED HEALTH CARE EDUCATION/TRAINING PROGRAM

## 2019-03-11 PROCEDURE — 93005 ELECTROCARDIOGRAM TRACING: CPT

## 2019-03-11 PROCEDURE — 63600175 PHARM REV CODE 636 W HCPCS: Performed by: INTERNAL MEDICINE

## 2019-03-11 PROCEDURE — 83735 ASSAY OF MAGNESIUM: CPT | Mod: 91

## 2019-03-11 PROCEDURE — 97161 PT EVAL LOW COMPLEX 20 MIN: CPT

## 2019-03-11 PROCEDURE — 93010 EKG 12-LEAD: ICD-10-PCS | Mod: ,,, | Performed by: INTERNAL MEDICINE

## 2019-03-11 PROCEDURE — 83735 ASSAY OF MAGNESIUM: CPT

## 2019-03-11 PROCEDURE — 84484 ASSAY OF TROPONIN QUANT: CPT

## 2019-03-11 RX ORDER — SODIUM CHLORIDE 0.9 % (FLUSH) 0.9 %
5 SYRINGE (ML) INJECTION
Status: DISCONTINUED | OUTPATIENT
Start: 2019-03-11 | End: 2019-03-12 | Stop reason: HOSPADM

## 2019-03-11 RX ORDER — GLUCAGON 1 MG
1 KIT INJECTION
Status: DISCONTINUED | OUTPATIENT
Start: 2019-03-11 | End: 2019-03-12 | Stop reason: HOSPADM

## 2019-03-11 RX ORDER — METOLAZONE 2.5 MG/1
5 TABLET ORAL DAILY
Status: DISCONTINUED | OUTPATIENT
Start: 2019-03-11 | End: 2019-03-11

## 2019-03-11 RX ORDER — LIDOCAINE HYDROCHLORIDE 10 MG/ML
10 INJECTION INFILTRATION; PERINEURAL ONCE
Status: COMPLETED | OUTPATIENT
Start: 2019-03-11 | End: 2019-03-11

## 2019-03-11 RX ORDER — ALLOPURINOL 300 MG/1
300 TABLET ORAL DAILY
Status: DISCONTINUED | OUTPATIENT
Start: 2019-03-11 | End: 2019-03-12 | Stop reason: HOSPADM

## 2019-03-11 RX ORDER — POTASSIUM CHLORIDE 20 MEQ/15ML
40 SOLUTION ORAL ONCE
Status: COMPLETED | OUTPATIENT
Start: 2019-03-11 | End: 2019-03-11

## 2019-03-11 RX ORDER — IBUPROFEN 200 MG
24 TABLET ORAL
Status: DISCONTINUED | OUTPATIENT
Start: 2019-03-11 | End: 2019-03-12 | Stop reason: HOSPADM

## 2019-03-11 RX ORDER — INSULIN ASPART 100 [IU]/ML
0-5 INJECTION, SOLUTION INTRAVENOUS; SUBCUTANEOUS
Status: DISCONTINUED | OUTPATIENT
Start: 2019-03-11 | End: 2019-03-12 | Stop reason: HOSPADM

## 2019-03-11 RX ORDER — METOLAZONE 2.5 MG/1
5 TABLET ORAL DAILY
Status: DISCONTINUED | OUTPATIENT
Start: 2019-03-12 | End: 2019-03-12 | Stop reason: HOSPADM

## 2019-03-11 RX ORDER — HEPARIN SODIUM 5000 [USP'U]/ML
5000 INJECTION, SOLUTION INTRAVENOUS; SUBCUTANEOUS EVERY 8 HOURS
Status: DISCONTINUED | OUTPATIENT
Start: 2019-03-11 | End: 2019-03-11

## 2019-03-11 RX ORDER — FUROSEMIDE 10 MG/ML
120 INJECTION INTRAMUSCULAR; INTRAVENOUS 2 TIMES DAILY
Status: DISCONTINUED | OUTPATIENT
Start: 2019-03-12 | End: 2019-03-12

## 2019-03-11 RX ORDER — FUROSEMIDE 10 MG/ML
120 INJECTION INTRAMUSCULAR; INTRAVENOUS ONCE
Status: COMPLETED | OUTPATIENT
Start: 2019-03-11 | End: 2019-03-11

## 2019-03-11 RX ORDER — NAPROXEN SODIUM 220 MG/1
81 TABLET, FILM COATED ORAL DAILY
Status: DISCONTINUED | OUTPATIENT
Start: 2019-03-11 | End: 2019-03-12 | Stop reason: HOSPADM

## 2019-03-11 RX ORDER — CARVEDILOL 3.12 MG/1
3.12 TABLET ORAL 2 TIMES DAILY
Status: DISCONTINUED | OUTPATIENT
Start: 2019-03-11 | End: 2019-03-12 | Stop reason: HOSPADM

## 2019-03-11 RX ORDER — ALBUMIN HUMAN 250 G/1000ML
50 SOLUTION INTRAVENOUS ONCE
Status: COMPLETED | OUTPATIENT
Start: 2019-03-11 | End: 2019-03-11

## 2019-03-11 RX ORDER — LIDOCAINE HYDROCHLORIDE 10 MG/ML
INJECTION INFILTRATION; PERINEURAL
Status: COMPLETED
Start: 2019-03-11 | End: 2019-03-11

## 2019-03-11 RX ORDER — FUROSEMIDE 10 MG/ML
80 INJECTION INTRAMUSCULAR; INTRAVENOUS
Status: COMPLETED | OUTPATIENT
Start: 2019-03-11 | End: 2019-03-11

## 2019-03-11 RX ORDER — FUROSEMIDE 10 MG/ML
120 INJECTION INTRAMUSCULAR; INTRAVENOUS 2 TIMES DAILY
Status: DISCONTINUED | OUTPATIENT
Start: 2019-03-11 | End: 2019-03-11

## 2019-03-11 RX ORDER — OXYCODONE AND ACETAMINOPHEN 5; 325 MG/1; MG/1
1 TABLET ORAL EVERY 6 HOURS PRN
Status: DISCONTINUED | OUTPATIENT
Start: 2019-03-11 | End: 2019-03-12 | Stop reason: HOSPADM

## 2019-03-11 RX ORDER — IBUPROFEN 200 MG
16 TABLET ORAL
Status: DISCONTINUED | OUTPATIENT
Start: 2019-03-11 | End: 2019-03-12 | Stop reason: HOSPADM

## 2019-03-11 RX ORDER — EPINEPHRINE 0.22MG
100 AEROSOL WITH ADAPTER (ML) INHALATION EVERY MORNING
Status: DISCONTINUED | OUTPATIENT
Start: 2019-03-11 | End: 2019-03-12 | Stop reason: HOSPADM

## 2019-03-11 RX ORDER — ALLOPURINOL 100 MG/1
300 TABLET ORAL DAILY
Status: DISCONTINUED | OUTPATIENT
Start: 2019-03-11 | End: 2019-03-11

## 2019-03-11 RX ORDER — EZETIMIBE 10 MG/1
10 TABLET ORAL DAILY
Status: DISCONTINUED | OUTPATIENT
Start: 2019-03-11 | End: 2019-03-12 | Stop reason: HOSPADM

## 2019-03-11 RX ORDER — BISACODYL 5 MG
5 TABLET, DELAYED RELEASE (ENTERIC COATED) ORAL DAILY PRN
COMMUNITY
End: 2021-01-25 | Stop reason: CLARIF

## 2019-03-11 RX ADMIN — METOLAZONE 5 MG: 5 TABLET ORAL at 12:03

## 2019-03-11 RX ADMIN — ALLOPURINOL 300 MG: 300 TABLET ORAL at 11:03

## 2019-03-11 RX ADMIN — LIDOCAINE HYDROCHLORIDE 10 ML: 10 INJECTION INFILTRATION; PERINEURAL at 04:03

## 2019-03-11 RX ADMIN — ALBUMIN HUMAN 50 G: 0.25 SOLUTION INTRAVENOUS at 06:03

## 2019-03-11 RX ADMIN — Medication 100 MG: at 10:03

## 2019-03-11 RX ADMIN — CEFTOLOZANE AND TAZOBACTAM 3000 MG: 1; .5 INJECTION, POWDER, LYOPHILIZED, FOR SOLUTION INTRAVENOUS at 01:03

## 2019-03-11 RX ADMIN — POTASSIUM CHLORIDE 40 MEQ: 20 SOLUTION ORAL at 06:03

## 2019-03-11 RX ADMIN — FUROSEMIDE 120 MG: 10 INJECTION, SOLUTION INTRAMUSCULAR; INTRAVENOUS at 06:03

## 2019-03-11 RX ADMIN — HEPARIN SODIUM 5000 UNITS: 5000 INJECTION, SOLUTION INTRAVENOUS; SUBCUTANEOUS at 06:03

## 2019-03-11 RX ADMIN — ASPIRIN 81 MG CHEWABLE TABLET 81 MG: 81 TABLET CHEWABLE at 08:03

## 2019-03-11 RX ADMIN — INSULIN DETEMIR 20 UNITS: 100 INJECTION, SOLUTION SUBCUTANEOUS at 08:03

## 2019-03-11 RX ADMIN — CEFTOLOZANE AND TAZOBACTAM 3000 MG: 1; .5 INJECTION, POWDER, LYOPHILIZED, FOR SOLUTION INTRAVENOUS at 10:03

## 2019-03-11 RX ADMIN — EZETIMIBE 10 MG: 10 TABLET ORAL at 08:03

## 2019-03-11 RX ADMIN — LIDOCAINE HYDROCHLORIDE 10 ML: 10 INJECTION, SOLUTION INFILTRATION; PERINEURAL at 04:03

## 2019-03-11 RX ADMIN — FUROSEMIDE 120 MG: 10 INJECTION, SOLUTION INTRAMUSCULAR; INTRAVENOUS at 12:03

## 2019-03-11 RX ADMIN — CARVEDILOL 3.12 MG: 3.12 TABLET, FILM COATED ORAL at 08:03

## 2019-03-11 RX ADMIN — POTASSIUM CHLORIDE 40 MEQ: 20 SOLUTION ORAL at 01:03

## 2019-03-11 RX ADMIN — FUROSEMIDE 80 MG: 10 INJECTION, SOLUTION INTRAMUSCULAR; INTRAVENOUS at 12:03

## 2019-03-11 RX ADMIN — CEFTOLOZANE AND TAZOBACTAM 3000 MG: 1; .5 INJECTION, POWDER, LYOPHILIZED, FOR SOLUTION INTRAVENOUS at 04:03

## 2019-03-11 RX ADMIN — INSULIN ASPART 1 UNITS: 100 INJECTION, SOLUTION INTRAVENOUS; SUBCUTANEOUS at 02:03

## 2019-03-11 NOTE — SUBJECTIVE & OBJECTIVE
Past Medical History:   Diagnosis Date    Arthritis     Cellulitis     CKD (chronic kidney disease), stage III     Coronary artery disease     Diabetes mellitus     Diabetic retinopathy     Diabetic ulcer of left foot     Glaucoma     Gout     Hyperlipemia     Hypertension     ICD (implantable cardioverter-defibrillator) in place 11/02/2018    Left chest    Non-pressure chronic ulcer of other part of left foot with fat layer exposed 10/23/2018    PVD (peripheral vascular disease)     Type 2 diabetes mellitus with left diabetic foot ulcer 10/29/2018    Unsteady gait     uses a wheelchair       Past Surgical History:   Procedure Laterality Date    AHMED GLAUCOMA IMPLANT Left 2011    DONE AT [facility]    AMPUTATION, TOE Left 12/5/2018    [facility]formed by [facility] at [facility] OR    AMPUTATION, TOES  2-5 Left 11/16/2018    [facility]formed by [facility] at [facility]    BAERVELDT GLAUCOMA IMPLANT Left 2012    WITH CATARACT EXTRACTION//DONE AT [facility]    CARDIAC CATHETERIZATION Left 05/2016    CARDIAC DEFIBRILLATOR PLACEMENT Left 11/02/2018    CATARACT EXTRACTION W/  INTRAOCULAR LENS IMPLANT Left 2012    WITH BAERVEDT//DONE AT [facility]    CATARACT EXTRACTION W/  INTRAOCULAR LENS IMPLANT Right 09/26/2018    COMPLEX ()    CB DESTRUCTION WITH CYCLO G6 Left 02/15/2017        CYST REMOVAL      DEBRIDEMENT, FOOT  12/28/2018    Performed by [facility] at [facility] OR    Exam under anesthesia, left foot debridement, left foot washout, possible left second through fifth metatarsal resection Left 12/28/2018    Performed by [facility] at [facility] OR    Exam under anesthesia, left foot debridement, washout and all other indicated procedures Left 12/5/2018    Performed by [facility] at [facility] OR    EXCISION, LESION, METATARSAL BONE  12/28/2018    Performed by [facility] at [facility] OR    HEART CATH-LEFT N/A 5/6/2016    Performed by Mike  JONATHON Magana MD at [facility] CATH LAB    INCISION AND DRAINAGE Left 11/16/2018    Performed by [facility] at [facility] OR    Incision and Drainage, left lower extremity debridement, washout Left 11/14/2018    Performed by Mitch Chan MD at [facility] OR    INSERTION, ICD GENERATOR, SINGLE CHAMBER N/A 11/2/2018    Performed by Pernell Greer MD at [facility] CATH LAB    INSERTION, IOL PROSTHESIS Right 9/26/2018    Performed by Perla Cortés MD at [facility] OR 1ST FLR    LEFT FOOT WOUND DEBRIDEMENT, WASHOUT AND ALL OTHER INDICATED PROCEDURES Left 2/13/2019    Performed by Mitch Chan MD at [facility] OR    PARACENTESIS, ABDOMINAL, INITIAL N/A 3/1/2019    Performed by [facility] Provider at [facility] OR    PARACENTESIS, ABDOMINAL, REPEAT N/A 2/11/2019    Performed by [facility] Diagnostic Provider at [facility] OR    PHACOEMULSIFICATION, CATARACT Right 9/26/2018    Performed by [facility]la [facility]és MD at [facility] OR [facility] FLR    Right foot surgery  10/2014    TOE AMPUTATION Right     first and second    TONSILLECTOMY      TRABECULECTOMY/G 6 LASER Left 2/15/2017    Performed by Perla Cortés MD at [facility] OR [facility]ST FLR       Review of patient's allergies indicates:   Allergen Reactions    Statins-hmg-coa reductase inhibitors      Generalized Pain    Onglyza [saxagliptin]     Penicillins Rash       Current Facility-Administered Medications on File Prior to Encounter   Medication    lactated ringers infusion    lidocaine (PF) 10 mg/ml (1%) injection 10 mg     Current Outpatient Medications on File Prior to Encounter   Medication Sig    allopurinol (ZYLOPRIM) 300 MG tablet Take 1 tablet (300 mg total) by mouth once daily.    aspirin 81 MG Chew Take 81 mg by mouth once daily.    carvedilol (COREG) 3.125 MG tablet Take 1 tablet (3.125 mg total) by mouth 2 (two) times daily.    coenzyme Q10 100 mg capsule Take 100 mg by mouth every morning.    ezetimibe (ZETIA) 10 mg tablet Take 1 tablet (10 mg total) by mouth  "once daily.    furosemide (LASIX) 40 MG tablet Take 1.5 tablets (60 mg total) by mouth 2 (two) times daily.    metOLazone (ZAROXOLYN) 5 MG tablet Take 1 tablet (5 mg total) by mouth every Monday and Thursday. 30 minutes before lasix.    oxyCODONE-acetaminophen (PERCOCET) 5-325 mg per tablet Take 1 tablet by mouth every 6 (six) hours as needed for Pain.    brimonidine 0.2% (ALPHAGAN) 0.2 % Drop Place 1 drop into both eyes 2 (two) times daily.    dorzolamide-timolol 2-0.5% (COSOPT) 22.3-6.8 mg/mL ophthalmic solution Place 1 drop into both eyes 2 (two) times daily.    fish oil-omega-3 fatty acids 300-1,000 mg capsule Take 1 capsule by mouth 2 (two) times daily.    insulin aspart U-100 (NOVOLOG) 100 unit/mL InPn pen Use on premeal readings: 150-200=+2, 201-250=+4; 251-300=+6; 301-350=+8, over 350=+10 units    insulin degludec-liraglutide (XULTOPHY 100/3.6) 100 unit-3.6 mg /mL (3 mL) InPn Inject 42 Units into the skin once daily.    miconazole NITRATE 2 % (MICOTIN) 2 % top powder Apply topically 2 (two) times daily.    MULTIVIT,THER IRON,CA,FA & MIN (MULTIVITAMIN) Tab Take 1 tablet by mouth every morning.     pen needle, diabetic 31 gauge x 1/4" Ndle Use as directed    senna-docusate 8.6-50 mg (PERICOLACE) 8.6-50 mg per tablet Take 1 tablet by mouth 2 (two) times daily.     Family History     Problem Relation (Age of Onset)    Diabetes Mother    Heart disease Brother    No Known Problems Father, Sister, Maternal Aunt, Maternal Uncle, Paternal Aunt, Paternal Uncle, Maternal Grandmother, Maternal Grandfather, Paternal Grandmother, Paternal Grandfather        Tobacco Use    Smoking status: Former Smoker     Packs/day: 1.00     Years: 3.00     Pack years: 3.00     Types: Cigarettes     Last attempt to quit: 1984     Years since quittin.6    Smokeless tobacco: Never Used   Substance and Sexual Activity    Alcohol use: Yes     Alcohol/week: 0.6 oz     Types: 1 Cans of beer per week     Comment: " Occasional    Drug use: No    Sexual activity: Not Currently     Review of Systems   Constitutional: Negative for chills, diaphoresis and fever.   HENT: Negative for rhinorrhea and sore throat.    Eyes: Negative for pain and visual disturbance.   Respiratory: Positive for shortness of breath. Negative for cough.    Cardiovascular: Positive for leg swelling. Negative for chest pain and palpitations.   Gastrointestinal: Positive for abdominal distention. Negative for abdominal pain, constipation, diarrhea, nausea and vomiting.   Genitourinary: Negative for decreased urine volume, dysuria and hematuria.   Musculoskeletal: Negative for arthralgias and myalgias.   Skin: Positive for wound. Negative for color change and rash.   Neurological: Positive for weakness. Negative for syncope and headaches.   All other systems reviewed and are negative.    Objective:     Vital Signs (Most Recent):  Temp: 98 °F (36.7 °C) (03/11/19 0132)  Pulse: 84 (03/11/19 0241)  Resp: (!) 22 (03/11/19 0241)  BP: 103/76 (03/11/19 0241)  SpO2: (!) 93 % (03/11/19 0241) Vital Signs (24h Range):  Temp:  [98 °F (36.7 °C)-98.7 °F (37.1 °C)] 98 °F (36.7 °C)  Pulse:  [84-91] 84  Resp:  [19-26] 22  SpO2:  [93 %-100 %] 93 %  BP: (103-138)/(64-94) 103/76     Weight: 108.5 kg (239 lb 3.2 oz)  Body mass index is 34.32 kg/m².    Physical Exam   Constitutional: He is oriented to person, place, and time. He appears well-developed and well-nourished. No distress.   HENT:   Head: Normocephalic and atraumatic.   Eyes: Conjunctivae and EOM are normal.   Neck: Normal range of motion. Neck supple.   Cardiovascular: Normal rate and regular rhythm.   Murmur (S3) heard.  Pulmonary/Chest: Effort normal. No respiratory distress. He has rales (Bilateral lower lobes ).   Abdominal: Soft. He exhibits distension. There is no tenderness.   Musculoskeletal: Normal range of motion. He exhibits edema (tense edema up to his midline, bilaterally).   Left foot in dressing    Neurological: He is alert and oriented to person, place, and time.   Skin: Skin is warm and dry. No rash noted. He is not diaphoretic. No erythema.   Psychiatric: He has a normal mood and affect. His behavior is normal.   Vitals reviewed.        CRANIAL NERVES     CN III, IV, VI   Extraocular motions are normal.        Significant Labs: All pertinent labs within the past 24 hours have been reviewed.    Significant Imaging: I have reviewed all pertinent imaging results/findings within the past 24 hours.

## 2019-03-11 NOTE — ASSESSMENT & PLAN NOTE
STARKEY and severe volume overload/anasarca which I suspect is secondary to dietary indiscretion, EKG unchanged, and no chest pain to suggest ischemia.     - Aggressive diuresis, given lasix 80 IV in ED, will given 120 mg IV with metolazone  - trying to assess for PO agent that pt responds to with trials of torsemide and possibly bumex  - Cardiac diet and fluid restrict   - Daily weights, strict ins and outs, dietary education    - Consider Cardiology consult in AM given complex history and severe volume overload.   - Patient compliant with Coreg will continue   -pt on room air

## 2019-03-11 NOTE — ASSESSMENT & PLAN NOTE
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 Most recent cultures from prior surgical procedure were positive for pseudomonas putida and acinetobacter sp., both pathogens have significant resistance mutations. No signs of systemic infection on presentation.     Plan:  - Continue current regimen of high dose zerbaxa (ceftolozane-tazobactam) 3 grams IV q 8 hours (started approximately on 2/22/19)  - Anticipate 6-week course and clinic f/u with Dr. West  - Will coordinate with patient's HH regarding reducing volume of infusion to avoid volume-overload

## 2019-03-11 NOTE — ASSESSMENT & PLAN NOTE
Will need paracentesis in AM, can send diagnostic studies, likely secondary to heart failure, most recent CT shows no evidence of steatosis or cirrhosis  - Consider

## 2019-03-11 NOTE — ASSESSMENT & PLAN NOTE
Creatine and BUN elevated but around baseline, closely monitor function and UOP, If creatine worsens would obtain UA and lytes and consider changing abx dosing.

## 2019-03-11 NOTE — PT/OT/SLP EVAL
Physical Therapy Evaluation    Patient Name:  Marvin Ray   MRN:  [id number]    Recommendations:     Discharge Recommendations:  ( PT ( pt states that he does not want any facility placement ))   Discharge Equipment Recommendations: none   Barriers to discharge: None    Assessment:     Marvin Ray is a 58 y.o. male admitted with a medical diagnosis of Acute on chronic combined systolic and diastolic congestive heart failure.  He presents with the following impairments/functional limitations:  weakness, impaired endurance, impaired functional mobilty, impaired self care skills, gait instability, decreased lower extremity function, orthopedic precautions .  At today's session, patient able to transfer from bed to chair with CGA and chair to bed with min A. Pt states he has been performing stand pivots to w/c primarily. Patient is close to their functional baseline at this time,  but would still benefit from skilled PT services to prevent deconditioning, reduce length of stay and prevent falls while in the hospital. Pt states that he does not want to go to a facility for further therapy and would rather do it at home.     Benefits of skilled PT services include decreasing risk of falls, preventing skin break down and limiting further deconditioning during their hospital stay. Discharge recommendation home with home health PT in order to maximize mobility, decrease caregiver burden and increase  functional independence while in the home setting.          Rehab Prognosis: Fair; patient would benefit from acute skilled PT services to address these deficits and reach maximum level of function.    Recent Surgery: * No surgery found *      Plan:     During this hospitalization, patient to be seen 3 x/week to address the identified rehab impairments via gait training, therapeutic activities, therapeutic exercises, neuromuscular re-education and progress toward the following goals:    · Plan of Care Expires:   04/11/19    Subjective     Chief Complaint: none  Patient/Family Comments/goals: to go home  Pain/Comfort:  · Pain Rating 1: 0/10  · Pain Rating Post-Intervention 1: 0/10  · Pain Rating Post-Intervention 2: 0/10    Patients cultural, spiritual, Jain conflicts given the current situation: no    Living Environment:  Pt lives in a handicapped accessible home with ramp access with his sister.   Prior to admission, patients level of function was mod ( I) with transfers and mobility using w/c in his home.  Equipment used at home: walker, rolling, wheelchair, bedside commode, walker, standard.  DME owned (not currently used): none.  Upon discharge, patient will have assistance from his sister and family.    Objective:     Communicated with RN  prior to session.  Patient found all lines intact, call button in reach and sitting EOB with  telemetry, peripheral IV  upon PT entry to room.    General Precautions: Standard, fall   Orthopedic Precautions:N/A(NWB to LLE per pt)   Braces: (DARCO shoe LLE)     Exams:  · Cognitive Exam:  Patient is oriented to Person, Place, Time and Situation  · Fine Motor Coordination: -       Intact  · Gross Motor Coordination:  WFL  · Postural Exam:  Patient presented with the following abnormalities: -       Rounded shoulders  · -       Forward head  · Sensation: -       Intact  · RLE ROM: WFL  · RLE Strength: WFL  · LLE ROM: WFL except foot/ankle not assessed  · LLE Strength: WFL except foot/ankle not assessed    Functional Mobility:  · Bed Mobility:     · Transfers:  Sit to Stand:  contact guard assistance with no AD  · Bed to Chair: contact guard assistance with  no AD  using  Stand Pivot  · Chair to bed: minimum assistance with  no AD  using  Squat Pivot    Balance:   Static Sitting:   NORMAL: No deviations seen in posture held statically  Dynamic Sitting:   GOOD+: Maintains balance through MAXIMAL excursions of active trunk motion  Static Standing:   FAIR+: Takes MINIMAL challenges  from all directions  Dynamic Standin: N/A        Therapeutic Activities and Exercises:     Patient education  · Patient educated on the role of PT and POC  · Patient educated on importance  activity while in the hosptial per tolerance for improved endurance and to limit deconditioning   · Patient educated on safe transfers with nursing as appropriate  · Patient educated on proper transfer mechanics and safety  · All of patients questions were answered within the scope of PT        AM-PAC 6 CLICK MOBILITY  Total Score:14     Patient left up in chair with all lines intact, call button in reach and RN  notified.    GOALS:   Multidisciplinary Problems     Physical Therapy Goals        Problem: Physical Therapy Goal    Goal Priority Disciplines Outcome Goal Variances Interventions   Physical Therapy Goal     PT, PT/OT Ongoing (interventions implemented as appropriate)     Description:  Goals to be met by: 3/21/19     Patient will increase functional independence with mobility by performin. Supine to sit with Contact Guard Assistance  2. Sit to supine with Contact Guard Assistance  3. Sit to stand transfer with Contact Guard Assistance with LRD.   4. Bed to chair transfer with Supervision using LRD.     ** add gait goal if WB precautions allow**                      History:     Past Medical History:   Diagnosis Date    Arthritis     Cellulitis     CKD (chronic kidney disease), stage III     Coronary artery disease     Diabetes mellitus     Diabetic retinopathy     Diabetic ulcer of left foot     Glaucoma     Gout     Hyperlipemia     Hypertension     ICD (implantable cardioverter-defibrillator) in place 2018    Left chest    Non-pressure chronic ulcer of other part of left foot with fat layer exposed 10/23/2018    PVD (peripheral vascular disease)     Type 2 diabetes mellitus with left diabetic foot ulcer 10/29/2018    Unsteady gait     uses a wheelchair       Past Surgical History:    Procedure Laterality Date    AHMED GLAUCOMA IMPLANT Left 2011    DONE AT [facility]    AMPUTATION, TOE Left 12/5/2018    [facility]    AMPUTATION, TOES  2-5 Left 11/16/2018    [facility] at [facility]    BAERVELDT GLAUCOMA IMPLANT Left 2012    WITH CATARACT EXTRACTION//DONE AT [facility]    CARDIAC CATHETERIZATION Left 05/2016    CARDIAC DEFIBRILLATOR PLACEMENT Left 11/02/2018    CATARACT EXTRACTION W/  INTRAOCULAR LENS IMPLANT Left 2012    WITH BAERVEDT//DONE AT [facility]    CATARACT EXTRACTION W/  INTRAOCULAR LENS IMPLANT Right 09/26/2018    COMPLEX ()    CB DESTRUCTION WITH CYCLO G6 Left 02/15/2017        CYST REMOVAL      DEBRIDEMENT, FOOT  12/28/2018    [facility] by [facility] at [facility] OR    Exam under anesthesia, left foot debridement, left foot washout, possible left second through fifth metatarsal resection Left 12/28/2018    [facility] by [facility]    Exam under anesthesia, left foot debridement, washout and all other indicated procedures Left 12/5/2018    [facility]    EXCISION, LESION, METATARSAL BONE  12/28/2018    [facility] by [facility] OR    HEART CATH-LEFT N/A 5/6/2016    [facility] at [facility] CATH LAB    INCISION AND DRAINAGE Left 11/16/2018    [facility] by [facility] OR    Incision and Drainage, left lower extremity debridement, washout Left 11/14/2018    [facility] by [facility] OR    INSERTION, ICD GENERATOR, SINGLE CHAMBER N/A 11/2/2018    Performed by Pernell Greer [facility] at [facility] CATH LAB    INSERTION, IOL PROSTHESIS Right 9/26/2018    [facility] by [facility] at [facility] OR [facility]ST FLR    LEFT FOOT WOUND DEBRIDEMENT, WASHOUT AND ALL OTHER INDICATED PROCEDURES Left 2/13/2019    [facility]    PARACENTESIS,  ABDOMINAL, INITIAL N/A 3/1/2019    [facility]    PARACENTESIS, ABDOMINAL, REPEAT N/A 2/11/2019    [facility]    PHACOEMULSIFICATION, CATARACT Right 9/26/2018    [facility] OR 1ST FLR    Right foot surgery  10/2014    TOE AMPUTATION Right     first and second    TONSILLECTOMY      TRABECULECTOMY/G 6 LASER Left 2/15/2017    [facility] at [facility] OR 1ST FLR       Time Tracking:     PT Received On: 03/11/19  PT Start Time: 0945     PT Stop Time: 1001  PT Total Time (min): 16 min     Billable Minutes: Evaluation 15  min      Goyo Donovan, PT  03/11/2019

## 2019-03-11 NOTE — ASSESSMENT & PLAN NOTE
+ Left Foot wound s/p debridement 2/13/19, multifactorial due to diabetes, PVD and venous insufficiency followed by Dr. Chan  - Continue Cefazolin tazobactam 3g Q8 Per Dr. West's note  - ID following  -wound care following

## 2019-03-11 NOTE — CONSULTS
[facility]  Infectious Disease  Consult Note    Patient Name: Marvin Ray  MRN: [id number]  Admission Date: 3/10/2019  Hospital Length of Stay: 0 days  Attending Physician: April Pugh MD  Primary Care Provider: Rubén Davis MD     Isolation Status: Contact    Patient information was obtained from patient, relative(s), past medical records and ER records.      Consults  Assessment/Plan:     MDR Acinetobacter baumannii infection    59 y/o male with T2DM, CAD, HFrEF (EF 25%) s/p AICD placement, PVD, venous stasis and L foot osteomyelitis s/p multiple debridements and amputation of digits currently on home IV ceftolozane-tazobactam presented to ED on 3/11 with abdominal distension concerning for heart failure exacerbation. Most recent cultures from prior surgical procedure were positive for pseudomonas putida and acinetobacter sp., both pathogens have significant resistance mutations. No signs of systemic infection on presentation.     Plan:  - Continue current regimen of high dose zerbaxa (ceftolozane-tazobactam) 3 grams IV q 8 hours (started approximately on 2/22/19)  - Anticipate 6-week course and clinic f/u with Dr. West  - Will coordinate with patient's HH regarding reducing volume of infusion to avoid volume-overload         Thank you for your consult. I will sign off. Please contact us if you have any additional questions.    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 ID is consulted for abx management. Cultures from patient's foot  wounds have been positive for VRE, stenotrophomonas sp, and MDR Pseudomonas sp. Patient underwent debridement on 2/13/19, and cultures from that surgical procedure were positive for pseudomonas putida and acinetobacter sp., both pathogens have significant resistance mutations. Patient follows with Dr. West who started 4-6 week course of high dose zerbaxa (ceftolozane-tazobactam) 3 grams IV q 8 hours (started approximately on 2/22/19). Patient's family member is concerned about his volume status from IV infusion of abx.     Patient denies irritation at PICC site, fevers, chills, cough, abdominal pain, rashes, dysuria. Does complain of mild SOB, attributes to increased abdominal pressure.     Past Medical History:   Diagnosis Date    Arthritis     Cellulitis     CKD (chronic kidney disease), stage III     Coronary artery disease     Diabetes mellitus     Diabetic retinopathy     Diabetic ulcer of left foot     Glaucoma     Gout     Hyperlipemia     Hypertension     ICD (implantable cardioverter-defibrillator) in place 11/02/2018    Left chest    Non-pressure chronic ulcer of other part of left foot with fat layer exposed 10/23/2018    PVD (peripheral vascular disease)     Type 2 diabetes mellitus with left diabetic foot ulcer 10/29/2018    Unsteady gait     uses a wheelchair       Past Surgical History:   Procedure Laterality Date    AHMED GLAUCOMA IMPLANT Left 2011    DONE AT [facility]    AMPUTATION, TOE Left 12/5/2018    [facility]    AMPUTATION, TOES  2-5 Left 11/16/2018    [facility] by [facility] at [facility] OR    BAERVELDT GLAUCOMA IMPLANT Left 2012    WITH CATARACT EXTRACTION//DONE AT [facility]    CARDIAC CATHETERIZATION Left 05/2016    CARDIAC DEFIBRILLATOR PLACEMENT Left 11/02/2018    CATARACT EXTRACTION W/  INTRAOCULAR LENS IMPLANT Left 2012    WITH BAERVEDT//DONE AT [facility]    CATARACT EXTRACTION W/  INTRAOCULAR LENS IMPLANT Right 09/26/2018     COMPLEX ()    CB DESTRUCTION WITH CYCLO G6 Left 02/15/2017        CYST REMOVAL      DEBRIDEMENT, FOOT  12/28/2018    [facility] OR    Exam under anesthesia, left foot debridement, left foot washout, possible left second through fifth metatarsal resection Left 12/28/2018    [facility]formed by [facility] OR    Exam under anesthesia, left foot debridement, washout and all other indicated procedures Left 12/5/2018    [facility] by [facility] OR    EXCISION, LESION, METATARSAL BONE  12/28/2018    [facility]formed by [facility] at [facility] OR    HEART CATH-LEFT N/A 5/6/2016    [facility]formed by [facility] at [facility] CATH LAB    INCISION AND DRAINAGE Left 11/16/2018    [facility]formed by [facility] at [facility] OR    Incision and Drainage, left lower extremity debridement, washout Left 11/14/2018    [facility]formed by [facility] at [facility] OR    INSERTION, ICD GENERATOR, SINGLE CHAMBER N/A 11/2/2018    Performed by [facility] at [facility] CATH LAB    INSERTION, IOL PROSTHESIS Right 9/26/2018    Performed by [facility] at [facility] OR 1ST FLR    LEFT FOOT WOUND DEBRIDEMENT, WASHOUT AND ALL OTHER INDICATED PROCEDURES Left 2/13/2019    [facility]formed by [facility] at [facility] OR    PARACENTESIS, ABDOMINAL, INITIAL N/A 3/1/2019    [facility]formed by [facility] at [facility] OR    PARACENTESIS, ABDOMINAL, REPEAT N/A 2/11/2019    [facility]formed by [facility] OR    PHACOEMULSIFICATION, CATARACT Right 9/26/2018    Performed by [facility] at [facility] OR 1ST FLR    Right foot surgery  10/2014    TOE AMPUTATION Right     first and second    TONSILLECTOMY      TRABECULECTOMY/G 6 LASER Left 2/15/2017    Performed by [facility] at [facility] OR 1ST FLR       Review of patient's allergies indicates:   Allergen Reactions    Statins-hmg-coa reductase inhibitors       Generalized Pain    Onglyza [saxagliptin]     Penicillins Rash       Medications:    (Not in a hospital admission)  Antibiotics (From admission, onward)    Start     Stop Route Frequency Ordered    19 0430  ceftolozane-tazobactam (ZERBAXA) 3,000 mg in dextrose 5 % 100 mL      -- IV Every 8 hours (non-standard times) 19 0319        Antifungals (From admission, onward)    None        Antivirals (From admission, onward)    None           Immunization History   Administered Date(s) Administered    Influenza - Quadrivalent - PF 10/28/2013, 10/23/2014, 10/22/2015, 2017, 2018    Influenza - Trivalent - PF (PED) 10/22/2014    PPD Test 2018    Pneumococcal Polysaccharide - 23 Valent 10/22/2015       Family History     Problem Relation (Age of Onset)    Diabetes Mother    Heart disease Brother    No Known Problems Father, Sister, Maternal Aunt, Maternal Uncle, Paternal Aunt, Paternal Uncle, Maternal Grandmother, Maternal Grandfather, Paternal Grandmother, Paternal Grandfather        Social History     Socioeconomic History    Marital status: Legally      Spouse name: Not on file    Number of children: Not on file    Years of education: 14    Highest education level: Not on file   Social Needs    Financial resource strain: Not on file    Food insecurity - worry: Not on file    Food insecurity - inability: Not on file    Transportation needs - medical: Not on file    Transportation needs - non-medical: Not on file   Occupational History    Not on file   Tobacco Use    Smoking status: Former Smoker     Packs/day: 1.00     Years: 3.00     Pack years: 3.00     Types: Cigarettes     Last attempt to quit: 1984     Years since quittin.6    Smokeless tobacco: Never Used   Substance and Sexual Activity    Alcohol use: Yes     Alcohol/week: 0.6 oz     Types: 1 Cans of beer per week     Comment: Occasional    Drug use: No    Sexual activity: Not Currently   Other  Topics Concern    Not on file   Social History Narrative    Not on file     Review of Systems   Constitutional: Negative for chills and fever.   HENT: Negative for rhinorrhea and sore throat.    Eyes: Positive for visual disturbance (attributes to bilateral glaucoma). Negative for photophobia.   Respiratory: Positive for shortness of breath. Negative for cough.    Cardiovascular: Negative for chest pain and palpitations.   Gastrointestinal: Positive for abdominal distention. Negative for abdominal pain, nausea and vomiting.   Genitourinary: Negative for difficulty urinating and dysuria.   Musculoskeletal: Negative for neck pain and neck stiffness.   Skin: Positive for wound (L foot). Negative for color change.   Neurological: Negative for dizziness and headaches.     Objective:     Vital Signs (Most Recent):  Temp: 98 °F (36.7 °C) (03/11/19 0132)  Pulse: 91 (03/11/19 1007)  Resp: 18 (03/11/19 1007)  BP: 127/73 (03/11/19 1007)  SpO2: 96 % (03/11/19 1007) Vital Signs (24h Range):  Temp:  [98 °F (36.7 °C)-98.7 °F (37.1 °C)] 98 °F (36.7 °C)  Pulse:  [83-93] 91  Resp:  [10-26] 18  SpO2:  [93 %-100 %] 96 %  BP: (103-147)/(62-94) 127/73     Weight: 108.5 kg (239 lb 3.2 oz)  Body mass index is 34.32 kg/m².    Estimated Creatinine Clearance: 64.6 mL/min (A) (based on SCr of 1.5 mg/dL (H)).    Physical Exam   Constitutional: No distress.   HENT:   Head: Normocephalic.   Mouth/Throat: Oropharynx is clear and moist.   Eyes: No scleral icterus.   Bilateral pupils sluggish to react   Neck: Neck supple. No tracheal deviation present.   Cardiovascular: Normal rate and regular rhythm.   Pulmonary/Chest: Effort normal and breath sounds normal. No respiratory distress.   Abdominal: He exhibits distension. There is no tenderness. There is no rebound.   Musculoskeletal: He exhibits no edema or tenderness.   Skin: He is not diaphoretic.   Vitals reviewed.      Significant Labs:   CBC:   Recent Labs   Lab 03/10/19  0149 03/11/19  3519    WBC 7.99 7.98   HGB 9.3* 9.6*   HCT 30.6* 31.1*    255     CMP:   Recent Labs   Lab 03/10/19  2349 03/11/19  0335    133*   K 3.2* 3.4*   CL 97 97   CO2 28 27   * 143*   BUN 72* 74*   CREATININE 1.6* 1.5*   CALCIUM 8.9 8.6*   PROT 6.7 6.7   ALBUMIN 2.1* 2.1*   BILITOT 0.3 0.3   ALKPHOS 96 96   AST 20 15   ALT 12 12   ANIONGAP 11 9   EGFRNONAA 46.1* 49.9*     Microbiology Results (last 7 days)     ** No results found for the last 168 hours. **          Significant Imaging: I have reviewed all pertinent imaging results/findings within the past 24 hours.     CXR:  Impression       Low lung volumes with suspected mild edema and small pleural effusion.  Overall aeration has improved when compared with December 2018.      Electronically signed by: Hector Green MD  Date: 03/11/2019  Time: 00:18

## 2019-03-11 NOTE — ASSESSMENT & PLAN NOTE
STARKEY and severe volume overload/anasarca which I suspect is secondary to dietary indiscretion, EKG unchanged, and no chest pain to suggest ischemia.     - Aggressive diuresis, given lasix 80 IV in ED, will given 120 mg IV with metolazone  - If UOP does not increase will start lasix drip and consider diuril   - Cardiac diet and fluid restrict   - Daily weights, strict ins and outs, dietary education    - Consider Cardiology consult in AM given complex history and severe volume overload.   - Patient compliant with Coreg will continue

## 2019-03-11 NOTE — ED PROVIDER NOTES
Encounter Date: 3/10/2019       History     Chief Complaint   Patient presents with    Shortness of Breath     Pt to ER c/o worsening SOB and abd pain/distention. Hx of ascites. Last paracentesis 2 weeks ago. He had a 6 pound weight gain in the past week.     Abdominal Pain     HPI   Mr. Ray is a 57 yo M w/ pmh notable for DM w/ retinopathy and peripheral neuropathy, CKD, CAD, HTN, HLD, CHF ef 20% by recent echo w/ ICD placement, PVD, recently diagnosed osteomyelitis of left lower extremity s/p amputation of digits, currently on IV antibiotics at home via picc line, recent IR guided paracentesis with 10.4L of ascites evacuated from abdomen, presenting to the ED for evaluation of worsening abd distention and shortness of breath, consistent with prior episodes requiring diuresis and paracentesis. Patient has been taking his medications as directed, states that his lower extremity is much better, good granulation tissue. Denies fever, chills, worsening abd pain, chest pain, nausea, vomiting, diarrhea.     Review of patient's allergies indicates:   Allergen Reactions    Statins-hmg-coa reductase inhibitors      Generalized Pain    Onglyza [saxagliptin]     Penicillins Rash     Past Medical History:   Diagnosis Date    Arthritis     Cellulitis     CKD (chronic kidney disease), stage III     Coronary artery disease     Diabetes mellitus     Diabetic retinopathy     Diabetic ulcer of left foot     Glaucoma     Gout     Hyperlipemia     Hypertension     ICD (implantable cardioverter-defibrillator) in place 11/02/2018    Left chest    Non-pressure chronic ulcer of other part of left foot with fat layer exposed 10/23/2018    PVD (peripheral vascular disease)     Type 2 diabetes mellitus with left diabetic foot ulcer 10/29/2018    Unsteady gait     uses a wheelchair     Past Surgical History:   Procedure Laterality Date    AHMED GLAUCOMA IMPLANT Left 2011    DONE AT [facility]    AMPUTATION, TOE Left  12/5/2018    [facility] at [facility] OR    AMPUTATION, TOES  2-5 Left 11/16/2018    [facility] at [facility]    BAERVELDT GLAUCOMA IMPLANT Left 2012    WITH CATARACT EXTRACTION//DONE AT [facility]    CARDIAC CATHETERIZATION Left 05/2016    CARDIAC DEFIBRILLATOR PLACEMENT Left 11/02/2018    CATARACT EXTRACTION W/  INTRAOCULAR LENS IMPLANT Left 2012    WITH BAERVEDT//DONE AT [facility]    CATARACT EXTRACTION W/  INTRAOCULAR LENS IMPLANT Right 09/26/2018    COMPLEX ()    CB DESTRUCTION WITH CYCLO G6 Left 02/15/2017        CYST REMOVAL      DEBRIDEMENT, FOOT  12/28/2018    [facility] at [facility]    Exam under anesthesia, left foot debridement, left foot washout, possible left second through fifth metatarsal resection Left 12/28/2018    [facility] at [facility]    Exam under anesthesia, left foot debridement, washout and all other indicated procedures Left 12/5/2018    [facility] at [facility]    EXCISION, LESION, METATARSAL BONE  12/28/2018    [facility] at [facility]    HEART CATH-LEFT N/A 5/6/2016    [facility] at [facility] C[facility]    INCISION AND DRAINAGE Left 11/16/2018    [facility] at [facility]    Incision and Drainage, left lower extremity debridement, washout Left 11/14/2018    [facility] at [facility]    INSERTION, ICD GENERATOR, SINGLE CHAMBER N/A 11/2/2018    Performed by Pernell Greer MD at [facility] CATH LAB    INSERTION, IOL PROSTHESIS Right 9/26/2018    [facility] by [facility] at [facility] OR [facility]    LEFT FOOT WOUND DEBRIDEMENT, WASHOUT AND ALL OTHER INDICATED PROCEDURES Left 2/13/2019    [facility] by [facility] at [facility]    PARACENTESIS, ABDOMINAL, INITIAL N/A 3/1/2019    Performed by [facility] at [facility] OR    PARACENTESIS,  ABDOMINAL, REPEAT N/A 2019    Performed by [facility]    PHACOEMULSIFICATION, CATARACT Right 2018    [facility]    Right foot surgery  10/2014    TOE AMPUTATION Right     first and second    TONSILLECTOMY      TRABECULECTOMY/G 6 LASER Left 2/15/2017    Performed by [facility] at [facility] OR [facility]     Family History   Problem Relation Age of Onset    Diabetes Mother     Heart disease Brother     No Known Problems Father     No Known Problems Sister     No Known Problems Maternal Aunt     No Known Problems Maternal Uncle     No Known Problems Paternal Aunt     No Known Problems Paternal Uncle     No Known Problems Maternal Grandmother     No Known Problems Maternal Grandfather     No Known Problems Paternal Grandmother     No Known Problems Paternal Grandfather     Anemia Neg Hx     Arrhythmia Neg Hx     Asthma Neg Hx     Clotting disorder Neg Hx     Fainting Neg Hx     Heart attack Neg Hx     Heart failure Neg Hx     Hyperlipidemia Neg Hx     Hypertension Neg Hx     Stroke Neg Hx     Atrial Septal Defect Neg Hx     Amblyopia Neg Hx     Blindness Neg Hx     Glaucoma Neg Hx     Macular degeneration Neg Hx     Retinal detachment Neg Hx     Strabismus Neg Hx      Social History     Tobacco Use    Smoking status: Former Smoker     Packs/day: 1.00     Years: 3.00     Pack years: 3.00     Types: Cigarettes     Last attempt to quit: 1984     Years since quittin.6    Smokeless tobacco: Never Used   Substance Use Topics    Alcohol use: Yes     Alcohol/week: 0.6 oz     Types: 1 Cans of beer per week     Comment: Occasional    Drug use: No     Review of Systems   Constitutional: Negative for chills, diaphoresis and fever.   HENT: Negative for congestion, sinus pressure and sore throat.    Eyes: Negative for photophobia and visual disturbance.   Respiratory: Positive for shortness of breath.  Negative for cough, wheezing and stridor.    Cardiovascular: Positive for leg swelling. Negative for chest pain and palpitations.   Gastrointestinal: Positive for abdominal distention and abdominal pain (abd fullness). Negative for blood in stool, diarrhea, nausea and vomiting.   Genitourinary: Negative for dysuria, flank pain and frequency.   Musculoskeletal: Negative for back pain and neck pain.   Skin: Negative for pallor, rash and wound.   Neurological: Negative for dizziness, syncope, weakness, light-headedness and numbness.   Psychiatric/Behavioral: Negative for agitation and confusion.       Physical Exam     Initial Vitals [03/10/19 2305]   BP Pulse Resp Temp SpO2   129/64 91 (!) 22 98.7 °F (37.1 °C) (!) 93 %      MAP       --         Physical Exam    Nursing note and vitals reviewed.  Constitutional: He appears well-developed and well-nourished. He is not diaphoretic. He is cooperative.  Non-toxic appearance. He does not have a sickly appearance. He does not appear ill.   HENT:   Head: Normocephalic and atraumatic.   Mouth/Throat: Oropharynx is clear and moist. No oropharyngeal exudate.   Eyes: EOM are normal. Pupils are equal, round, and reactive to light. No scleral icterus.   Neck: Normal range of motion. No tracheal deviation present.   Cardiovascular: Normal rate and regular rhythm.   2+ pitting edema noted, 1+ to the level of mid abdomen    Pulmonary/Chest: No accessory muscle usage or stridor. Tachypnea noted. No respiratory distress. He has no wheezes. He has no rhonchi. He has rales in the right lower field and the left lower field.   Abdominal: He exhibits distension. There is no rigidity, no rebound and no guarding.   Tense, large volume ascites noted, abd fullness to palpation    Musculoskeletal: He exhibits edema.        Arms:       Right lower leg: He exhibits tenderness and edema.        Left lower leg: He exhibits tenderness and edema.   Left foot amputation of toes, granulation tissue,  healing well   Neurological: He is alert and oriented to person, place, and time. GCS score is 15. GCS eye subscore is 4. GCS verbal subscore is 5. GCS motor subscore is 6.   Skin: Skin is warm. No pallor.       ED Course   Procedures  Labs Reviewed   CBC W/ AUTO DIFFERENTIAL - Abnormal; Notable for the following components:       Result Value    RBC 3.57 (*)     Hemoglobin 9.3 (*)     Hematocrit 30.6 (*)     MCH 26.1 (*)     MCHC 30.4 (*)     RDW 20.4 (*)     Lymph # 0.5 (*)     Gran% 78.9 (*)     Lymph% 6.6 (*)     All other components within normal limits   COMPREHENSIVE METABOLIC PANEL - Abnormal; Notable for the following components:    Potassium 3.2 (*)     Glucose 153 (*)     BUN, Bld 72 (*)     Creatinine 1.6 (*)     Albumin 2.1 (*)     eGFR if  53.3 (*)     eGFR if non  46.1 (*)     All other components within normal limits   URINALYSIS, REFLEX TO URINE CULTURE - Abnormal; Notable for the following components:    Ketones, UA Trace (*)     All other components within normal limits    Narrative:     Preferred Collection Type->Urine, Clean Catch  orange cup   B-TYPE NATRIURETIC PEPTIDE - Abnormal; Notable for the following components:    BNP 2,895 (*)     All other components within normal limits   URINALYSIS MICROSCOPIC - Abnormal; Notable for the following components:    Hyaline Casts, UA 6 (*)     All other components within normal limits    Narrative:     Preferred Collection Type->Urine, Clean Catch  orange cup   HEMOGLOBIN A1C - Abnormal; Notable for the following components:    Hemoglobin A1C 6.7 (*)     Estimated Avg Glucose 146 (*)     All other components within normal limits   PHOSPHORUS - Abnormal; Notable for the following components:    Phosphorus 5.0 (*)     All other components within normal limits   CBC W/ AUTO DIFFERENTIAL - Abnormal; Notable for the following components:    RBC 3.55 (*)     Hemoglobin 9.6 (*)     Hematocrit 31.1 (*)     MCHC 30.9 (*)     RDW  20.2 (*)     MPV 9.1 (*)     Lymph # 0.5 (*)     Gran% 77.8 (*)     Lymph% 6.5 (*)     All other components within normal limits   COMPREHENSIVE METABOLIC PANEL - Abnormal; Notable for the following components:    Sodium 133 (*)     Potassium 3.4 (*)     Glucose 143 (*)     BUN, Bld 74 (*)     Creatinine 1.5 (*)     Calcium 8.6 (*)     Albumin 2.1 (*)     eGFR if  57.7 (*)     eGFR if non  49.9 (*)     All other components within normal limits   CREATININE, URINE, RANDOM - Abnormal; Notable for the following components:    Creatinine, Random Ur 22.0 (*)     All other components within normal limits    Narrative:     two extra urines in yellow tube plus gray    POCT GLUCOSE - Abnormal; Notable for the following components:    POCT Glucose 242 (*)     All other components within normal limits   POCT GLUCOSE - Abnormal; Notable for the following components:    POCT Glucose 125 (*)     All other components within normal limits   PROTIME-INR   APTT   MAGNESIUM   TROPONIN I   SHAW'S STAIN, URINE RANDOM    Narrative:     two extra urines in yellow tube plus gray    UREA NITROGEN, URINE, RANDOM    Narrative:     two extra urines in yellow tube plus gray      HO3  59 yo w/ multiple large therapeutic paracentesis in the past, presenting with abd distention, weight gain, shortness of breath worsening since last admission, patient was trying to make it to tomorrow to contact his physician for paracentesis, however, with worsening shortness of breath was unable to weight.   Concern for CHF exacerbation, obviously worsening ascites, will require therapeutic likely large volume paracentesis, diuresis.  At this time do not clinically suspect SBP given presentation and lack of other infectious symptoms.   Anticipate admission for ongoing management, as well as paracentesis. Labs and imaging currently pending.   Derick Guerrero MD PGY3  03/10/2019 11:59 PM    HO3  Notably elevated BNP at 2895,  "pulm edema on chest xray, large volute ascites by exam.  No leukocytosis, stable H&H, elevated Cr at 1.6.   Derick Guerrero MD PGY3  03/11/2019 12:43 AM  EKG Readings: (Independently Interpreted)   Sinus rhythm with occasional PVCs.  Ventricular rate 91 beats per minute.  Right axis deviation.  No ST segment elevation or depression.  Poor anteroseptal R-wave progression.       Imaging Results          X-Ray Chest 1 View (Final result)  Result time 03/11/19 00:18:18    Final result by Hector Green MD (03/11/19 00:18:18)                 Impression:      Low lung volumes with suspected mild edema and small pleural effusion.  Overall aeration has improved when compared with December 2018.      Electronically signed by: Hector Green MD  Date:    03/11/2019  Time:    00:18             Narrative:    EXAMINATION:  XR CHEST 1 VIEW    CLINICAL HISTORY:  Provided history is "  Shortness of breath".    TECHNIQUE:  One view of the chest.    COMPARISON:  12/25/2018.    FINDINGS:  Cardiac silhouette is stable.  Left-sided AICD is present with transvenous lead overlying the heart.  Right-sided PICC overlies the lower SVC.  Lung volumes are relatively low.  There are minimal scattered parenchymal and interstitial opacities which could be exaggerated by hypoventilatory state.  Probable small pleural effusions and adjacent passive atelectasis.  Overall, aeration has improved when compared with December 2018.                                 Medical Decision Making:   History:   Old Medical Records: I decided to obtain old medical records.  Independently Interpreted Test(s):   I have ordered and independently interpreted X-rays - see prior notes.  I have ordered and independently interpreted EKG Reading(s) - see prior notes  Clinical Tests:   Lab Tests: Ordered and Reviewed  The following lab test(s) were unremarkable: CBC, CMP and BNP  Radiological Study: Ordered and Reviewed  Medical Tests: Ordered and Reviewed        "       Attending Attestation:             Attending ED Notes:   I evaluated this patient with the resident physician and was involved in every aspect of the treatment rendered.  I had a face-to-face encounter with the patient and performed a physical examination.     The patient is a 58-year-old male presenting with increased shortness of breath from baseline with associated abdominal discomfort due to distension and lower extremity edema. He was afebrile.  He had mild tachypnea with bibasilar rales.  He had tense ascites on examination of the abdomen.  He also had pitting edema to both lower extremities. EKG was negative for arrhythmia and acute ischemic changes.  Chest x-ray was positive for signs of volume overload.  On lab review, he had an acute kidney injury, markedly elevated BNP, and a normal troponin.  He had mild hypokalemia.  He had chronic , stable anemia.    Following ED workup, the patient was admitted to the hospitalist service for further evaluation and management.    I agree with the history, examination, workup, assessment, and plan as documented by the resident.     Elroy Butler III, M.D. - Attending             Clinical Impression:       ICD-10-CM ICD-9-CM   1. Acute on chronic congestive heart failure, unspecified heart failure type I50.9 428.0   2. Shortness of breath R06.02 786.05   3. Abdominal distention R14.0 787.3         Disposition:   Disposition: Admitted  Condition: Serious                        Derick Guerrero MD  Resident  03/11/19 0045       Elroy Butler III, MD  03/13/19 [id number]

## 2019-03-11 NOTE — PROCEDURES
"Marvin Ray is a 58 y.o. male patient.    Temp: 97.5 °F (36.4 °C) (19)  Pulse: 94 (19)  Resp: 18 (19)  BP: (!) 141/79 (19)  SpO2: 95 % (19)  Weight: 106.9 kg (235 lb 9.6 oz) (19)  Height: 5' 10" (177.8 cm) (19)       Paracentesis  Date/Time: 3/11/2019 4:32 PM  Performed by: Mitch Farrell MD  Authorized by: Mitch Farrell MD   Consent Done: Yes  Consent given by: patient  Patient understanding: patient states understanding of the procedure being performed  Patient consent: the patient's understanding of the procedure matches consent given  Procedure consent: procedure consent matches procedure scheduled  Patient identity confirmed:  and MRN  Procedure purpose: therapeutic  Indications: abdominal discomfort secondary to ascites    Anesthesia:  Local Anesthetic: lidocaine 1% without epinephrine  Ultrasound guidance: yes  Puncture site: right lower quadrant  Fluid removed: 6500(ml)  Fluid appearance: serous  Dressing: 4x4 sterile gauze  Complications: No  Specimens: Yes  Implants: No          Mitch Farrell  3/11/2019  "

## 2019-03-11 NOTE — ASSESSMENT & PLAN NOTE
+ Left Foot wound s/p debridement 2/13/19, multifactorial due to diabetes, PVD and venous insufficiency followed by Dr. Chan  - Continue Cefazolin tazobactam 3g Q8 Per Dr. West's note  - Consult ID and wound care in AM

## 2019-03-11 NOTE — PLAN OF CARE
Problem: Physical Therapy Goal  Goal: Physical Therapy Goal  Goals to be met by: 3/21/19     Patient will increase functional independence with mobility by performin. Supine to sit with Contact Guard Assistance  2. Sit to supine with Contact Guard Assistance  3. Sit to stand transfer with Contact Guard Assistance with LRD.   4. Bed to chair transfer with Supervision using LRD.     ** add gait goal if WB precautions allow**    Outcome: Ongoing (interventions implemented as appropriate)  Patient evaluated today. All goals established are appropriate for patient progression at this time.     Goyo Donovan PT, DPT  3/11/2019  Pager: [phone]

## 2019-03-11 NOTE — CONSULTS
Consult received. Full note to follow.    Please call for questions.    Thanks,  Sandra Conklin MD  Infectious Disease Fellow, PGY-5  Pager: [phone] or ext: [phone]  [facility]

## 2019-03-11 NOTE — ASSESSMENT & PLAN NOTE
On Long acting 32 units at home with sliding scale but reports sugars around 120s and no need for sliding scale  - Detemir 20 units daily with sliding scale In house  - A1c

## 2019-03-11 NOTE — HPI
58 year old male with DM2, CKD, HTN, CAD complicated by ischemic CHF ef 20% by recent echo w/ ICD placement, PVD, and recently diagnosed osteomyelitis of left lower extremity s/p amputation of digits, currently on IV antibiotics who presents to the ED with chief complaint of worsening abdominal distension and SOB.  Patient is accompanied by his sister. Patient reports worsening abdominal distension and STARKEY for the past 2 weeks.  He attribtues the fluid build up with dietary indiscretion (campbels soup) in addition to IV fluids from his abx.  Regarding his abdominal distension he reports a recent abdominal paracentesis with 10 L removed on 03/01.  He presented to the hospital today primarily for his SOB which resolved with 2L of NC.  He states he was supposed to be schedule for routine paracentesis, but so far has not heard from anyone.  He was scheduled to follow up with Dr. West on Monday regarding his left wound/osteomyeltits and abx management.  Patient otherwise states he is in his normal state of health and is upset that he is being admitted to the hospital.  He currently denies  fever or chills, chest pain, palpitations, change in BM, or abdominal pain.      Patient has been taking his medications as directed.  He admits to some dietary indiscretion with canned goods and still has good UOP.  At home he can walk without assistance and accomplish he ADLs with minimal assistance

## 2019-03-11 NOTE — SUBJECTIVE & OBJECTIVE
Past Medical History:   Diagnosis Date    Arthritis     Cellulitis     CKD (chronic kidney disease), stage III     Coronary artery disease     Diabetes mellitus     Diabetic retinopathy     Diabetic ulcer of left foot     Glaucoma     Gout     Hyperlipemia     Hypertension     ICD (implantable cardioverter-defibrillator) in place 11/02/2018    Left chest    Non-pressure chronic ulcer of other part of left foot with fat layer exposed 10/23/2018    PVD (peripheral vascular disease)     Type 2 diabetes mellitus with left diabetic foot ulcer 10/29/2018    Unsteady gait     uses a wheelchair       Past Surgical History:   Procedure Laterality Date    AHMED GLAUCOMA IMPLANT Left 2011    DONE AT [facility]    AMPUTATION, TOE Left 12/5/2018    Performed by Mitch Chan MD at [facility] OR    AMPUTATION, TOES  2-5 Left 11/16/2018    [facility] at [facility]    BAERVELDT GLAUCOMA IMPLANT Left 2012    WITH CATARACT EXTRACTION//DONE AT [facility]    CARDIAC CATHETERIZATION Left 05/2016    CARDIAC DEFIBRILLATOR PLACEMENT Left 11/02/2018    CATARACT EXTRACTION W/  INTRAOCULAR LENS IMPLANT Left 2012    WITH BAERVEDT//DONE AT [facility]    CATARACT EXTRACTION W/  INTRAOCULAR LENS IMPLANT Right 09/26/2018    COMPLEX ()    CB DESTRUCTION WITH CYCLO G6 Left 02/15/2017        CYST REMOVAL      DEBRIDEMENT, FOOT  12/28/2018    Per[facility] at [facility] OR    Exam under anesthesia, left foot debridement, left foot washout, possible left second through fifth metatarsal resection Left 12/28/2018    Performed by [facility] at [facility] OR    Exam under anesthesia, left foot debridement, washout and all other indicated procedures Left 12/5/2018    Per[facility] at [facility] OR    EXCISION, LESION, METATARSAL BONE  12/28/2018    Per[facility] by [facility] at [facility]    HEART CATH-LEFT N/A 5/6/2016    Performed by Mike  JONATHON Magana MD at [facility] CATH LAB    INCISION AND DRAINAGE Left 11/16/2018    [facility] by [facility] at [facility] OR    Incision and Drainage, left lower extremity debridement, washout Left 11/14/2018    Performed by [facility] at [facility] OR    INSERTION, ICD GENERATOR, SINGLE CHAMBER N/A 11/2/2018    Performed by Pernell Greer MD at [facility] CATH LAB    INSERTION, IOL PROSTHESIS Right 9/26/2018    Performed by Perla Cortés MD at [facility] OR 1ST FLR    LEFT FOOT WOUND DEBRIDEMENT, WASHOUT AND ALL OTHER INDICATED PROCEDURES Left 2/13/2019    Performed by [facility] at [facility] OR    PARACENTESIS, ABDOMINAL, INITIAL N/A 3/1/2019    Performed by [facility] at [facility] OR    PARACENTESIS, ABDOMINAL, REPEAT N/A 2/11/2019    Performed by [facility] Provider at [facility] OR    PHACOEMULSIFICATION, CATARACT Right 9/26/2018    Performed by Perla Cortés MD at [facility] OR 1ST FLR    Right foot surgery  10/2014    TOE AMPUTATION Right     first and second    TONSILLECTOMY      TRABECULECTOMY/G 6 LASER Left 2/15/2017    Performed by Perla Cortés MD at [facility] OR 1ST FLR       Review of patient's allergies indicates:   Allergen Reactions    Statins-hmg-coa reductase inhibitors      Generalized Pain    Onglyza [saxagliptin]     Penicillins Rash       Medications:    (Not in a hospital admission)  Antibiotics (From admission, onward)    Start     Stop Route Frequency Ordered    03/11/19 0430  ceftolozane-tazobactam (ZERBAXA) 3,000 mg in dextrose 5 % 100 mL      -- IV Every 8 hours (non-standard times) 03/11/19 0319        Antifungals (From admission, onward)    None        Antivirals (From admission, onward)    None           Immunization History   Administered Date(s) Administered    Influenza - Quadrivalent - PF 10/28/2013, 10/23/2014, 10/22/2015, 01/25/2017, 11/21/2018    Influenza - Trivalent - PF (PED) 10/22/2014    PPD Test 12/19/2018    Pneumococcal  Polysaccharide - 23 Valent 10/22/2015       Family History     Problem Relation (Age of Onset)    Diabetes Mother    Heart disease Brother    No Known Problems Father, Sister, Maternal Aunt, Maternal Uncle, Paternal Aunt, Paternal Uncle, Maternal Grandmother, Maternal Grandfather, Paternal Grandmother, Paternal Grandfather        Social History     Socioeconomic History    Marital status: Legally      Spouse name: Not on file    Number of children: Not on file    Years of education: 14    Highest education level: Not on file   Social Needs    Financial resource strain: Not on file    Food insecurity - worry: Not on file    Food insecurity - inability: Not on file    Transportation needs - medical: Not on file    Transportation needs - non-medical: Not on file   Occupational History    Not on file   Tobacco Use    Smoking status: Former Smoker     Packs/day: 1.00     Years: 3.00     Pack years: 3.00     Types: Cigarettes     Last attempt to quit: 1984     Years since quittin.6    Smokeless tobacco: Never Used   Substance and Sexual Activity    Alcohol use: Yes     Alcohol/week: 0.6 oz     Types: 1 Cans of beer per week     Comment: Occasional    Drug use: No    Sexual activity: Not Currently   Other Topics Concern    Not on file   Social History Narrative    Not on file     Review of Systems   Constitutional: Negative for chills and fever.   HENT: Negative for rhinorrhea and sore throat.    Eyes: Positive for visual disturbance (attributes to bilateral glaucoma). Negative for photophobia.   Respiratory: Positive for shortness of breath. Negative for cough.    Cardiovascular: Negative for chest pain and palpitations.   Gastrointestinal: Positive for abdominal distention. Negative for abdominal pain, nausea and vomiting.   Genitourinary: Negative for difficulty urinating and dysuria.   Musculoskeletal: Negative for neck pain and neck stiffness.   Skin: Positive for wound (L foot).  Negative for color change.   Neurological: Negative for dizziness and headaches.     Objective:     Vital Signs (Most Recent):  Temp: 98 °F (36.7 °C) (03/11/19 0132)  Pulse: 91 (03/11/19 1007)  Resp: 18 (03/11/19 1007)  BP: 127/73 (03/11/19 1007)  SpO2: 96 % (03/11/19 1007) Vital Signs (24h Range):  Temp:  [98 °F (36.7 °C)-98.7 °F (37.1 °C)] 98 °F (36.7 °C)  Pulse:  [83-93] 91  Resp:  [10-26] 18  SpO2:  [93 %-100 %] 96 %  BP: (103-147)/(62-94) 127/73     Weight: 108.5 kg (239 lb 3.2 oz)  Body mass index is 34.32 kg/m².    Estimated Creatinine Clearance: 64.6 mL/min (A) (based on SCr of 1.5 mg/dL (H)).    Physical Exam   Constitutional: No distress.   HENT:   Head: Normocephalic.   Mouth/Throat: Oropharynx is clear and moist.   Eyes: No scleral icterus.   Bilateral pupils sluggish to react   Neck: Neck supple. No tracheal deviation present.   Cardiovascular: Normal rate and regular rhythm.   Pulmonary/Chest: Effort normal and breath sounds normal. No respiratory distress.   Abdominal: He exhibits distension. There is no tenderness. There is no rebound.   Musculoskeletal: He exhibits no edema or tenderness.   Skin: He is not diaphoretic.   Vitals reviewed.      Significant Labs:   CBC:   Recent Labs   Lab 03/10/19  2349 03/11/19  0335   WBC 7.99 7.98   HGB 9.3* 9.6*   HCT 30.6* 31.1*    255     CMP:   Recent Labs   Lab 03/10/19  2349 03/11/19  0335    133*   K 3.2* 3.4*   CL 97 97   CO2 28 27   * 143*   BUN 72* 74*   CREATININE 1.6* 1.5*   CALCIUM 8.9 8.6*   PROT 6.7 6.7   ALBUMIN 2.1* 2.1*   BILITOT 0.3 0.3   ALKPHOS 96 96   AST 20 15   ALT 12 12   ANIONGAP 11 9   EGFRNONAA 46.1* 49.9*     Microbiology Results (last 7 days)     ** No results found for the last 168 hours. **          Significant Imaging: I have reviewed all pertinent imaging results/findings within the past 24 hours.     CXR:  Impression       Low lung volumes with suspected mild edema and small pleural effusion.  Overall aeration  has improved when compared with December 2018.      Electronically signed by: Hector Green MD  Date: 03/11/2019  Time: 00:18

## 2019-03-11 NOTE — ED NOTES
Pt verbalizing frustration and discomfort. Pt requesting information on possible paracentesis, he is reporting increasing abd pressure and sustained SOB. Hospital Med 2 paged. They will be rounding within the next hour or so. Pt updated on POC. Will continue to monitor.

## 2019-03-11 NOTE — HPI
Patient is 61 y/o male with T2DM, CAD, HFrEF (EF 25%) s/p AICD placement, PVD, venous stasis and L foot osteomyelitis s/p multiple debridements and amputation of digits currently on home IV ceftolozane-tazobactam presented to ED on 3/11 with abdominal distension concerning for heart failure exacerbation. ID is consulted for abx management. Cultures from patient's foot wounds have been positive for VRE, stenotrophomonas sp, and MDR Pseudomonas sp. Patient underwent debridement on 2/13/19, and cultures from that surgical procedure were positive for pseudomonas putida and acinetobacter sp., both pathogens have significant resistance mutations. Patient follows with Dr. West who started 4-6 week course of high dose zerbaxa (ceftolozane-tazobactam) 3 grams IV q 8 hours (started approximately on 2/22/19). Patient's family member is concerned about his volume status from IV infusion of abx.     Patient denies irritation at PICC site, fevers, chills, cough, abdominal pain, rashes, dysuria. Does complain of mild SOB, attributes to increased abdominal pressure.

## 2019-03-11 NOTE — ED TRIAGE NOTES
"Pt arrived per private vehicle with cc of "fluid on stomach". Pt also complaining of shortness of breath. Pt with recent paracentesis to drain 11L of fluids. Pt on home antibiotics for wound to left foot. Has picc line to right brachial for antibiotic administration.   "

## 2019-03-11 NOTE — ED NOTES
LOC: Pt is awake, alert, oriented x4. Affect is appropriate. Speech clear and appropriate.      Appearance: Pt resting comfortably in no acute distress. Pt clean and well groomed.     Skin: Skin warm and dry. Color consistent with ethnicity. Skin turgor normal. Mucus membranes moist. Skin not intact. Ulcer to left foot. Swelling bilaterally to lower extremities with weeping.      Musculoskeletal: Pt moving upper and lower extremities without difficulty. Generalized weakness.     Respiratory: Airway open and patent. Respirations spontaneous, even, easy, and unlabored. Pt has normal effort and rate. No accessory muscle use noted. Denies cough. Denies shortness of breath.     Cardiovascular: peripheral edema noted. No complaints of chest pain. S1S2 present. Rate regular. Radial pulses 2+.     Abdominal: Abdomen taunt and nontender. Distention noted. Denies n/v. Bowels sounds active x 4 quadrants.     Neurological: Eyes open spontaneously. Behavior appropriate to situation. Follows commands appropriately. Facial expression symmetrical. Purposeful motor response. Sensation intact.

## 2019-03-11 NOTE — ASSESSMENT & PLAN NOTE
Paracentesis in AM, can send diagnostic studies, likely secondary to heart failure, most recent CT and US shows no evidence of steatosis or cirrhosis

## 2019-03-11 NOTE — PROGRESS NOTES
Patient admitted to 9th floor. Report received from JILLIAN Dumont. Vital signs stable. No signs of acute distress noted. Patient oriented to call light and instructed to call for assistance. Dr. Escalante and Dr. Farrell at bedside for paracentesis. Will continue to monitor.

## 2019-03-11 NOTE — H&P
[facility] Medicine  History & Physical    Patient Name: Marvin Ray  MRN: [id number]  Admission Date: 3/10/2019  Attending Physician: April Pugh MD   Primary Care Provider: Rubén Davis MD    [facility] Medicine Team: [facility] 2 Xiang Mccormack MD     Patient information was obtained from patient, relative(s), past medical records and ER records.     Subjective:     Principal Problem:Acute on chronic combined systolic and diastolic congestive heart failure    Chief Complaint:   Chief Complaint   Patient presents with    Shortness of Breath     Pt to ER c/o worsening SOB and abd pain/distention. Hx of ascites. Last paracentesis 2 weeks ago. He had a 6 pound weight gain in the past week.     Abdominal Pain        HPI: 58 year old male with DM2, CKD, HTN, CAD complicated by ischemic CHF ef 20% by recent echo w/ ICD placement, PVD, and recently diagnosed osteomyelitis of left lower extremity s/p amputation of digits, currently on IV antibiotics who presents to the ED with chief complaint of worsening abdominal distension and SOB.  Patient is accompanied by his sister. Patient reports worsening abdominal distension and STARKEY for the past 2 weeks.  He attribtues the fluid build up with dietary indiscretion (campbels soup) in addition to IV fluids from his abx.  Regarding his abdominal distension he reports a recent abdominal paracentesis with 10 L removed on 03/01.  He presented to the hospital today primarily for his SOB which resolved with 2L of NC.  He states he was supposed to be schedule for routine paracentesis, but so far has not heard from anyone.  He was scheduled to follow up with Dr. West on Monday regarding his left wound/osteomyeltits and abx management.  Patient otherwise states he is in his normal state of health and is upset that he is being admitted to the hospital.  He currently denies  fever or chills, chest pain, palpitations, change in BM, or  abdominal pain.      Patient has been taking his medications as directed.  He admits to some dietary indiscretion with canned goods and still has good UOP.  At home he can walk without assistance and accomplish he ADLs with minimal assistance         Past Medical History:   Diagnosis Date    Arthritis     Cellulitis     CKD (chronic kidney disease), stage III     Coronary artery disease     Diabetes mellitus     Diabetic retinopathy     Diabetic ulcer of left foot     Glaucoma     Gout     Hyperlipemia     Hypertension     ICD (implantable cardioverter-defibrillator) in place 11/02/2018    Left chest    Non-pressure chronic ulcer of other part of left foot with fat layer exposed 10/23/2018    PVD (peripheral vascular disease)     Type 2 diabetes mellitus with left diabetic foot ulcer 10/29/2018    Unsteady gait     uses a wheelchair       Past Surgical History:   Procedure Laterality Date    AHMED GLAUCOMA IMPLANT Left 2011    DONE AT [facility] [place]    AMPUTATION, TOE Left 12/5/2018    Performed by [facility] at [facility] OR    AMPUTATION, TOES  2-5 Left 11/16/2018    [facility] at [facility]    BAERVELDT GLAUCOMA IMPLANT Left 2012    WITH CATARACT EXTRACTION//DONE AT [facility] [place]    CARDIAC CATHETERIZATION Left 05/2016    CARDIAC DEFIBRILLATOR PLACEMENT Left 11/02/2018    CATARACT EXTRACTION W/  INTRAOCULAR LENS IMPLANT Left 2012    WITH BAERVEDT//DONE AT [facility] [place]    CATARACT EXTRACTION W/  INTRAOCULAR LENS IMPLANT Right 09/26/2018    COMPLEX ()    CB DESTRUCTION WITH CYCLO G6 Left 02/15/2017        CYST REMOVAL      DEBRIDEMENT, FOOT  12/28/2018    Performed by [facility] at [facility] OR    Exam under anesthesia, left foot debridement, left foot washout, possible left second through fifth metatarsal resection Left 12/28/2018    Performed by [facility] at [facility] OR    Exam under anesthesia, left foot debridement, washout and  all other indicated procedures Left 12/5/2018    Performed by [facility] MD at [facility] OR    EXCISION, LESION, METATARSAL BONE  12/28/2018    Performed by Mitch [facility] at [facility] OR    HEART CATH-LEFT N/A 5/6/2016    Performed by [facility] Magana [facility] at [facility] CATH LAB    INCISION AND DRAINAGE Left 11/16/2018    Performed by Mitch Chan MD at [facility] OR    Incision and Drainage, left lower extremity debridement, washout Left 11/14/2018    Performed by Mitch Chan [facility] at [facility] OR    INSERTION, ICD GENERATOR, SINGLE CHAMBER N/A 11/2/2018    Performed by Pernell Greer MD at [facility] CATH LAB    INSERTION, IOL PROSTHESIS Right 9/26/2018    Performed by Perla Cortés MD at [facility] OR 1ST FLR    LEFT FOOT WOUND DEBRIDEMENT, WASHOUT AND ALL OTHER INDICATED PROCEDURES Left 2/13/2019    Performed by Mitch Chan MD at [facility] OR    PARACENTESIS, ABDOMINAL, INITIAL N/A 3/1/2019    Performed by [facility] at [facility] OR    PARACENTESIS, ABDOMINAL, REPEAT N/A 2/11/2019    Performed by [facility] at [facility] OR    PHACOEMULSIFICATION, CATARACT Right 9/26/2018    Performed by Perla Cortés MD at [facility] OR 1ST FLR    Right foot surgery  10/2014    TOE AMPUTATION Right     first and second    TONSILLECTOMY      TRABECULECTOMY/G 6 LASER Left 2/15/2017    Performed by Perla Cortés MD at [facility] OR 1ST FLR       Review of patient's allergies indicates:   Allergen Reactions    Statins-hmg-coa reductase inhibitors      Generalized Pain    Onglyza [saxagliptin]     Penicillins Rash       Current Facility-Administered Medications on File Prior to Encounter   Medication    lactated ringers infusion    lidocaine (PF) 10 mg/ml (1%) injection 10 mg     Current Outpatient Medications on File Prior to Encounter   Medication Sig    allopurinol (ZYLOPRIM) 300 MG tablet Take 1 tablet (300 mg total) by mouth once daily.    aspirin 81 MG Chew Take 81 mg by mouth  "once daily.    carvedilol (COREG) 3.125 MG tablet Take 1 tablet (3.125 mg total) by mouth 2 (two) times daily.    coenzyme Q10 100 mg capsule Take 100 mg by mouth every morning.    ezetimibe (ZETIA) 10 mg tablet Take 1 tablet (10 mg total) by mouth once daily.    furosemide (LASIX) 40 MG tablet Take 1.5 tablets (60 mg total) by mouth 2 (two) times daily.    metOLazone (ZAROXOLYN) 5 MG tablet Take 1 tablet (5 mg total) by mouth every Monday and Thursday. 30 minutes before lasix.    oxyCODONE-acetaminophen (PERCOCET) 5-325 mg per tablet Take 1 tablet by mouth every 6 (six) hours as needed for Pain.    brimonidine 0.2% (ALPHAGAN) 0.2 % Drop Place 1 drop into both eyes 2 (two) times daily.    dorzolamide-timolol 2-0.5% (COSOPT) 22.3-6.8 mg/mL ophthalmic solution Place 1 drop into both eyes 2 (two) times daily.    fish oil-omega-3 fatty acids 300-1,000 mg capsule Take 1 capsule by mouth 2 (two) times daily.    insulin aspart U-100 (NOVOLOG) 100 unit/mL InPn pen Use on premeal readings: 150-200=+2, 201-250=+4; 251-300=+6; 301-350=+8, over 350=+10 units    insulin degludec-liraglutide (XULTOPHY 100/3.6) 100 unit-3.6 mg /mL (3 mL) InPn Inject 42 Units into the skin once daily.    miconazole NITRATE 2 % (MICOTIN) 2 % top powder Apply topically 2 (two) times daily.    MULTIVIT,THER IRON,CA,FA & MIN (MULTIVITAMIN) Tab Take 1 tablet by mouth every morning.     pen needle, diabetic 31 gauge x 1/4" Ndle Use as directed    senna-docusate 8.6-50 mg (PERICOLACE) 8.6-50 mg per tablet Take 1 tablet by mouth 2 (two) times daily.     Family History     Problem Relation (Age of Onset)    Diabetes Mother    Heart disease Brother    No Known Problems Father, Sister, Maternal Aunt, Maternal Uncle, Paternal Aunt, Paternal Uncle, Maternal Grandmother, Maternal Grandfather, Paternal Grandmother, Paternal Grandfather        Tobacco Use    Smoking status: Former Smoker     Packs/day: 1.00     Years: 3.00     Pack years: 3.00     " Types: Cigarettes     Last attempt to quit: 1984     Years since quittin.6    Smokeless tobacco: Never Used   Substance and Sexual Activity    Alcohol use: Yes     Alcohol/week: 0.6 oz     Types: 1 Cans of beer per week     Comment: Occasional    Drug use: No    Sexual activity: Not Currently     Review of Systems   Constitutional: Negative for chills, diaphoresis and fever.   HENT: Negative for rhinorrhea and sore throat.    Eyes: Negative for pain and visual disturbance.   Respiratory: Positive for shortness of breath. Negative for cough.    Cardiovascular: Positive for leg swelling. Negative for chest pain and palpitations.   Gastrointestinal: Positive for abdominal distention. Negative for abdominal pain, constipation, diarrhea, nausea and vomiting.   Genitourinary: Negative for decreased urine volume, dysuria and hematuria.   Musculoskeletal: Negative for arthralgias and myalgias.   Skin: Positive for wound. Negative for color change and rash.   Neurological: Positive for weakness. Negative for syncope and headaches.   All other systems reviewed and are negative.    Objective:     Vital Signs (Most Recent):  Temp: 98 °F (36.7 °C) (19 0132)  Pulse: 84 (19 024)  Resp: (!) 22 (19 024)  BP: 103/76 (19)  SpO2: (!) 93 % (19) Vital Signs (24h Range):  Temp:  [98 °F (36.7 °C)-98.7 °F (37.1 °C)] 98 °F (36.7 °C)  Pulse:  [84-91] 84  Resp:  [19-26] 22  SpO2:  [93 %-100 %] 93 %  BP: (103-138)/(64-94) 103/76     Weight: 108.5 kg (239 lb 3.2 oz)  Body mass index is 34.32 kg/m².    Physical Exam   Constitutional: He is oriented to person, place, and time. He appears well-developed and well-nourished. No distress.   HENT:   Head: Normocephalic and atraumatic.   Eyes: Conjunctivae and EOM are normal.   Neck: Normal range of motion. Neck supple.   Cardiovascular: Normal rate and regular rhythm.   Murmur (S3) heard.  Pulmonary/Chest: Effort normal. No respiratory distress.  He has rales (Bilateral lower lobes ).   Abdominal: Soft. He exhibits distension. There is no tenderness.   Musculoskeletal: Normal range of motion. He exhibits edema (tense edema up to his midline, bilaterally).   Left foot in dressing, Right lower extremity wound dressed, with evidence of venous stasis     Neurological: He is alert and oriented to person, place, and time.   Skin: Skin is warm and dry. No rash noted. He is not diaphoretic. No erythema.   Psychiatric: He has a normal mood and affect. His behavior is normal.   Vitals reviewed.        CRANIAL NERVES     CN III, IV, VI   Extraocular motions are normal.        Significant Labs: All pertinent labs within the past 24 hours have been reviewed.    Significant Imaging: I have reviewed all pertinent imaging results/findings within the past 24 hours.    Assessment/Plan:     * Acute on chronic combined systolic and diastolic congestive heart failure    STARKEY and severe volume overload/anasarca which I suspect is secondary to dietary indiscretion, EKG unchanged, and no chest pain to suggest ischemia.     - Aggressive diuresis, given lasix 80 IV in ED, will given 120 mg IV with metolazone  - If UOP does not increase will start lasix drip and consider diuril   - Cardiac diet and fluid restrict   - Daily weights, strict ins and outs, dietary education    - Consider Cardiology consult in AM given complex history and severe volume overload.   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 Chan  - Continue Cefazolin tazobactam 3g Q8 Per Dr. West's note  - Consult ID and wound care in AM       Stage 3 chronic kidney disease    Creatine and BUN elevated but around baseline, closely monitor function and UOP, If creatine worsens would obtain UA and lytes and consider changing abx dosing.         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   Patient remains full code        Multiple open wounds of lower leg    Wound care consult in AM        PVD (peripheral vascular disease)    See wound        CAD (coronary artery disease)    Continue ASA, Zetia and BP control.      Essential hypertension    Continue Carvedilol 3.125, not on ACE or ARB likely would benefit in future.        Tophaceous gout    Continue allopurinol, 300 mg ordered, discuss dose with Pharmacy in AM given Kidney function          VTE Risk Mitigation (From admission, onward)        Ordered     heparin (porcine) injection 5,000 Units  Every 8 hours      03/11/19 0204     IP VTE HIGH RISK PATIENT  Once      03/11/19 0204     Place JANE hose  Until discontinued      03/11/19 0204        Dispo: Pending diuresis likely SNF, but if clinical course or UOP worsens would revisit goals of care.       Xiang Mccormack MD  Department of Hospital Medicine   [facility]

## 2019-03-11 NOTE — ED NOTES
Patient transported to floor with transport team via wheelchair, with personal belongings, with family, on monitor

## 2019-03-12 ENCOUNTER — TELEPHONE (OUTPATIENT)
Dept: PLASTIC SURGERY | Facility: CLINIC | Age: 59
End: 2019-03-12

## 2019-03-12 VITALS
HEART RATE: 80 BPM | RESPIRATION RATE: 18 BRPM | OXYGEN SATURATION: 93 % | BODY MASS INDEX: 31.55 KG/M2 | DIASTOLIC BLOOD PRESSURE: 73 MMHG | HEIGHT: 70 IN | SYSTOLIC BLOOD PRESSURE: 115 MMHG | TEMPERATURE: 98 F | WEIGHT: 220.38 LBS

## 2019-03-12 LAB
ALBUMIN SERPL BCP-MCNC: 2.3 G/DL
ALP SERPL-CCNC: 74 U/L
ALT SERPL W/O P-5'-P-CCNC: 8 U/L
ANION GAP SERPL CALC-SCNC: 8 MMOL/L
AST SERPL-CCNC: 13 U/L
BASOPHILS # BLD AUTO: 0.07 K/UL
BASOPHILS NFR BLD: 1.1 %
BILIRUB SERPL-MCNC: 0.4 MG/DL
BUN SERPL-MCNC: 71 MG/DL
CALCIUM SERPL-MCNC: 8.9 MG/DL
CHLORIDE SERPL-SCNC: 102 MMOL/L
CO2 SERPL-SCNC: 28 MMOL/L
CREAT SERPL-MCNC: 1.2 MG/DL
DIFFERENTIAL METHOD: ABNORMAL
EOSINOPHIL # BLD AUTO: 0.2 K/UL
EOSINOPHIL NFR BLD: 3.2 %
ERYTHROCYTE [DISTWIDTH] IN BLOOD BY AUTOMATED COUNT: 20.3 %
EST. GFR  (AFRICAN AMERICAN): >60 ML/MIN/1.73 M^2
EST. GFR  (NON AFRICAN AMERICAN): >60 ML/MIN/1.73 M^2
GLUCOSE SERPL-MCNC: 78 MG/DL
HCT VFR BLD AUTO: 28.9 %
HGB BLD-MCNC: 8.8 G/DL
IMM GRANULOCYTES # BLD AUTO: 0.02 K/UL
IMM GRANULOCYTES NFR BLD AUTO: 0.3 %
LYMPHOCYTES # BLD AUTO: 0.5 K/UL
LYMPHOCYTES NFR BLD: 7.8 %
MAGNESIUM SERPL-MCNC: 1.8 MG/DL
MCH RBC QN AUTO: 26.2 PG
MCHC RBC AUTO-ENTMCNC: 30.4 G/DL
MCV RBC AUTO: 86 FL
MONOCYTES # BLD AUTO: 0.8 K/UL
MONOCYTES NFR BLD: 13 %
NEUTROPHILS # BLD AUTO: 4.7 K/UL
NEUTROPHILS NFR BLD: 74.6 %
NRBC BLD-RTO: 0 /100 WBC
PHOSPHATE SERPL-MCNC: 4.5 MG/DL
PLATELET # BLD AUTO: 232 K/UL
PMV BLD AUTO: 9.6 FL
POCT GLUCOSE: 147 MG/DL (ref 70–110)
POCT GLUCOSE: 166 MG/DL (ref 70–110)
POCT GLUCOSE: 86 MG/DL (ref 70–110)
POTASSIUM SERPL-SCNC: 3.2 MMOL/L
PROT SERPL-MCNC: 5.8 G/DL
RBC # BLD AUTO: 3.36 M/UL
SODIUM SERPL-SCNC: 138 MMOL/L
WBC # BLD AUTO: 6.31 K/UL

## 2019-03-12 PROCEDURE — 63600175 PHARM REV CODE 636 W HCPCS: Performed by: STUDENT IN AN ORGANIZED HEALTH CARE EDUCATION/TRAINING PROGRAM

## 2019-03-12 PROCEDURE — 85025 COMPLETE CBC W/AUTO DIFF WBC: CPT

## 2019-03-12 PROCEDURE — 99239 HOSP IP/OBS DSCHRG MGMT >30: CPT | Mod: ,,, | Performed by: INTERNAL MEDICINE

## 2019-03-12 PROCEDURE — 25000003 PHARM REV CODE 250: Performed by: STUDENT IN AN ORGANIZED HEALTH CARE EDUCATION/TRAINING PROGRAM

## 2019-03-12 PROCEDURE — 99239 PR HOSPITAL DISCHARGE DAY,>30 MIN: ICD-10-PCS | Mod: ,,, | Performed by: INTERNAL MEDICINE

## 2019-03-12 PROCEDURE — 63600175 PHARM REV CODE 636 W HCPCS: Mod: JG | Performed by: INTERNAL MEDICINE

## 2019-03-12 PROCEDURE — 80053 COMPREHEN METABOLIC PANEL: CPT

## 2019-03-12 PROCEDURE — 83735 ASSAY OF MAGNESIUM: CPT

## 2019-03-12 PROCEDURE — 25000003 PHARM REV CODE 250: Performed by: INTERNAL MEDICINE

## 2019-03-12 PROCEDURE — S5571 INSULIN DISPOS PEN 3 ML: HCPCS | Performed by: STUDENT IN AN ORGANIZED HEALTH CARE EDUCATION/TRAINING PROGRAM

## 2019-03-12 PROCEDURE — 84100 ASSAY OF PHOSPHORUS: CPT

## 2019-03-12 RX ORDER — POTASSIUM CHLORIDE 750 MG/1
30 CAPSULE, EXTENDED RELEASE ORAL
Status: COMPLETED | OUTPATIENT
Start: 2019-03-12 | End: 2019-03-12

## 2019-03-12 RX ORDER — TORSEMIDE 20 MG/1
80 TABLET ORAL ONCE
Status: COMPLETED | OUTPATIENT
Start: 2019-03-12 | End: 2019-03-12

## 2019-03-12 RX ORDER — POTASSIUM CHLORIDE 7.45 MG/ML
10 INJECTION INTRAVENOUS ONCE
Status: COMPLETED | OUTPATIENT
Start: 2019-03-12 | End: 2019-03-12

## 2019-03-12 RX ORDER — POTASSIUM CHLORIDE 7.45 MG/ML
10 INJECTION INTRAVENOUS ONCE
Status: DISCONTINUED | OUTPATIENT
Start: 2019-03-12 | End: 2019-03-12

## 2019-03-12 RX ORDER — TORSEMIDE 20 MG/1
60 TABLET ORAL ONCE
Status: COMPLETED | OUTPATIENT
Start: 2019-03-12 | End: 2019-03-12

## 2019-03-12 RX ORDER — POTASSIUM CHLORIDE 20 MEQ/1
40 TABLET, EXTENDED RELEASE ORAL
Status: COMPLETED | OUTPATIENT
Start: 2019-03-12 | End: 2019-03-12

## 2019-03-12 RX ORDER — TORSEMIDE 20 MG/1
80 TABLET ORAL 2 TIMES DAILY
Qty: 120 TABLET | Refills: 0 | Status: SHIPPED | OUTPATIENT
Start: 2019-03-12 | End: 2019-03-28 | Stop reason: SDUPTHER

## 2019-03-12 RX ORDER — TORSEMIDE 20 MG/1
80 TABLET ORAL ONCE
Status: DISCONTINUED | OUTPATIENT
Start: 2019-03-12 | End: 2019-03-12

## 2019-03-12 RX ORDER — LOSARTAN POTASSIUM 25 MG/1
25 TABLET ORAL DAILY
Qty: 90 TABLET | Refills: 3 | Status: SHIPPED | OUTPATIENT
Start: 2019-03-12 | End: 2019-07-29

## 2019-03-12 RX ADMIN — CARVEDILOL 3.12 MG: 3.12 TABLET, FILM COATED ORAL at 10:03

## 2019-03-12 RX ADMIN — METOLAZONE 5 MG: 2.5 TABLET ORAL at 10:03

## 2019-03-12 RX ADMIN — CEFTOLOZANE AND TAZOBACTAM 3000 MG: 1; .5 INJECTION, POWDER, LYOPHILIZED, FOR SOLUTION INTRAVENOUS at 05:03

## 2019-03-12 RX ADMIN — POTASSIUM CHLORIDE 10 MEQ: 10 INJECTION, SOLUTION INTRAVENOUS at 10:03

## 2019-03-12 RX ADMIN — POTASSIUM CHLORIDE 30 MEQ: 750 CAPSULE, EXTENDED RELEASE ORAL at 10:03

## 2019-03-12 RX ADMIN — ASPIRIN 81 MG CHEWABLE TABLET 81 MG: 81 TABLET CHEWABLE at 10:03

## 2019-03-12 RX ADMIN — TORSEMIDE 80 MG: 20 TABLET ORAL at 04:03

## 2019-03-12 RX ADMIN — TORSEMIDE 60 MG: 20 TABLET ORAL at 10:03

## 2019-03-12 RX ADMIN — POTASSIUM CHLORIDE 40 MEQ: 1500 TABLET, EXTENDED RELEASE ORAL at 02:03

## 2019-03-12 RX ADMIN — POTASSIUM CHLORIDE 40 MEQ: 1500 TABLET, EXTENDED RELEASE ORAL at 05:03

## 2019-03-12 RX ADMIN — Medication 100 MG: at 06:03

## 2019-03-12 RX ADMIN — CEFTOLOZANE AND TAZOBACTAM 3000 MG: 1; .5 INJECTION, POWDER, LYOPHILIZED, FOR SOLUTION INTRAVENOUS at 12:03

## 2019-03-12 RX ADMIN — ALLOPURINOL 300 MG: 300 TABLET ORAL at 10:03

## 2019-03-12 RX ADMIN — OXYCODONE HYDROCHLORIDE AND ACETAMINOPHEN 1 TABLET: 5; 325 TABLET ORAL at 02:03

## 2019-03-12 RX ADMIN — INSULIN DETEMIR 20 UNITS: 100 INJECTION, SOLUTION SUBCUTANEOUS at 10:03

## 2019-03-12 RX ADMIN — EZETIMIBE 10 MG: 10 TABLET ORAL at 10:03

## 2019-03-12 NOTE — PLAN OF CARE
03/12/19 1635   Final Note   Assessment Type Final Discharge Note   Anticipated Discharge Disposition Home-Health   What phone number can be called within the next 1-3 days to see how you are doing after discharge? [phone]   Hospital Follow Up  Appt(s) scheduled? Yes   Discharge plans and expectations educations in teach back method with documentation complete? Yes   Right Care Referral Info   Post Acute Recommendation Home-care   Referral Type HH and IV abx therapy    Facility Name [facility]      Future Appointments   Date Time Provider Department Center   3/13/2019  1:30 PM Leonidas Diaz MD [facility]   3/19/2019  1:00 PM Pernell Greer MD [facility] CARDIO [facility]   3/28/2019  3:20 PM Mitch Chan MD [facility] VAS JAYMIE [facility]   4/26/2019  9:50 AM LISA Jackman MD [facility]PH[facility]AL [facility]   5/24/2019  2:00 PM ZHANG, VISUAL FIELDS [facility]   5/24/2019  2:30 PM Perla Cortés MD [facility]   6/10/2019  2:30 PM Sheryl Madison NP [facility] -

## 2019-03-12 NOTE — HOSPITAL COURSE
Pt admitted to hospital medicine for CHF exacerbation. Paracentesis 3/11/19 removed 6.5 L, albumin 50g (25%) given.   3/12/19: trial of torsemide 60mg resulted in no diuresis. Will increase dose for trial and if no response will consider bumex.

## 2019-03-12 NOTE — PLAN OF CARE
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 [A49.8, Z16.24]  Yes    MDR Acinetobacter baumannii infection [A49.9, Z16.24]  Yes    Goals of care, counseling/discussion [Z71.89]  Not Applicable    Multiple open wounds of lower leg [S81.809A]  Yes    PVD (peripheral vascular disease) [I73.9]  Yes     Chronic    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       Future Appointments   Date Time Provider Department Center   3/13/2019  1:30 PM Leonidas Diaz MD [facility] PLASTE [facility]   3/19/2019  1:00 PM Pernell Greer MD [facility] CARDIO [facility]   3/28/2019  3:20 PM Mitch Chan MD [facility] VAS JAYMIE [facility]   4/26/2019  9:50 AM LISA Jackman MD [facility] OPHTHAL [facility]   5/24/2019  2:00 PM ZHANG, VISUAL FIELDS [facility]PHTHAL [facility]   5/24/2019  2:30 PM Perla Cortés MD [facility]   6/10/2019  2:30 PM Sheryl Madison NP [facility] ENDO [facility]      Follow-up Information     Rubén Davis MD. Schedule an appointment as soon as possible for a visit in 1 week.    Specialty:  Family Medicine  Contact information:  [address] [place] [id number] [phone]             [facility] CARDIOLOGY.    Specialty:  Cardiology  Why:  for optimization of goal directed therapy  Contact information:  [address]  [facility] [address]  [phone]                   I have seen and examined this patient face to face today. My clinical findings that support the need for the home health skilled services and home bound status are the following:  Weakness/numbness causing balance and gait disturbance due to Heart Failure, Infection and Weakness/Debility making it taxing to leave home.  Requiring assistive device to leave home due to unsteady gait caused by  Infection and Weakness/Debility.    Allergies:  Review of patient's allergies indicates:   Allergen Reactions    Statins-hmg-coa reductase inhibitors      Generalized Pain    Onglyza [saxagliptin]     Penicillins Rash       Diet: cardiac diet    Activities: activity as tolerated    Nursing:   SN to complete comprehensive assessment including routine vital signs. Instruct on disease process and s/s of complications to report to MD. Review/verify medication list sent home with the patient at time of discharge  and instruct patient/caregiver as needed. Frequency may be adjusted depending on start of care date.    Notify MD if SBP > 160 or < 90; DBP > 90 or < 50; HR > 120 or < 50; Temp > 101;       CONSULTS:    Physical Therapy to evaluate and treat. Evaluate for home safety and equipment needs; Establish/upgrade home exercise program. Perform / instruct on therapeutic exercises, gait training, transfer training, and Range of Motion.  Occupational Therapy to evaluate and treat. Evaluate home environment for safety and equipment needs. Perform/Instruct on transfers, ADL training, ROM, and therapeutic  exercises.    MISCELLANEOUS CARE:  Home Infusion Therapy:   SN to perform Infusion Therapy/Central Line Care.  Review Central Line Care & Central Line Flush with patient.    Administer (drug and dose): ceftolozane-tazobactam 3g q8hs  Last dose given:      12:28 3/12/19                    Home dose due: 20:30 3/12/19    Scrub the Hub: Prior to accessing the line, always perform a 30 second alcohol scrub  Each lumen of the central line is to be flushed at least daily with 10 mL Normal Saline and 3 mL Heparin flush (10 units/mL)  Skilled Nurse (SN) may draw blood from IV access  Blood Draw Procedure:   - Aspirate at least 5 mL of blood   - Discard   - Obtain specimen   - Change injection cap   - Flush with 20 mL Normal Saline followed by a                 3-5 mL Heparin flush (10 units/mL)  Central :   - Sterile dressing changes are done weekly and as needed.   - Use chlor-hexadine scrub to cleanse site, apply Biopatch to insertion site,       apply securement device dressing   - Injection caps are changed weekly and after EVERY lab draw.   - If sterile gauze is under dressing to control oozing,                 dressing change must be performed every 24 hours until gauze is not needed.    WOUND CARE ORDERS  yes:  Foot Ulcer:  Location: left foot        Medications: Review discharge medications with patient and family and provide education.      Current Discharge Medication List      START taking these medications    Details   dextrose 5 % SolP 100 mL with ceftolozane-tazobactam 1.5 gram SolR 3,000 mg injection Inject 3,000 mg into the vein every 8 (eight) hours. for 21 days      losartan (COZAAR) 25 MG tablet Take 1 tablet (25 mg total) by mouth once daily.  Qty: 90 tablet, Refills: 3      torsemide (DEMADEX) 20 MG Tab Take 4 tablets (80 mg total) by mouth 2 (two) times daily.  Qty: 120 tablet, Refills: 0         CONTINUE these medications which have NOT CHANGED    Details   allopurinol (ZYLOPRIM) 300  MG tablet Take 1 tablet (300 mg total) by mouth once daily.  Qty: 30 tablet, Refills: 3      aspirin 325 MG tablet Take 325 mg by mouth once daily.       bisacodyl (DULCOLAX) 5 mg EC tablet Take 5 mg by mouth daily as needed for Constipation.      brimonidine 0.2% (ALPHAGAN) 0.2 % Drop Place 1 drop into both eyes 2 (two) times daily.  Qty: 10 mL, Refills: 11    Associated Diagnoses: Neovascular glaucoma of left eye, severe stage; Neovascular glaucoma, right eye, mild stage      carvedilol (COREG) 3.125 MG tablet Take 1 tablet (3.125 mg total) by mouth 2 (two) times daily.  Qty: 60 tablet, Refills: 11      coenzyme Q10 100 mg capsule Take 100 mg by mouth every morning.      dorzolamide-timolol 2-0.5% (COSOPT) 22.3-6.8 mg/mL ophthalmic solution Place 1 drop into both eyes 2 (two) times daily.  Qty: 1 Bottle, Refills: 11    Associated Diagnoses: Neovascular glaucoma of left eye, severe stage; Neovascular glaucoma, right eye, mild stage      ezetimibe (ZETIA) 10 mg tablet Take 1 tablet (10 mg total) by mouth once daily.  Qty: 30 tablet, Refills: 2    Associated Diagnoses: Hyperlipidemia LDL goal <70      fish oil-omega-3 fatty acids 300-1,000 mg capsule Take 1 capsule by mouth 2 (two) times daily.  Qty: 60 capsule, Refills: 3    Associated Diagnoses: HLD (hyperlipidemia)      insulin aspart U-100 (NOVOLOG) 100 unit/mL InPn pen Use on premeal readings: 150-200=+2, 201-250=+4; 251-300=+6; 301-350=+8, over 350=+10 units  Qty: 2.7 mL, Refills: 11      insulin glargine-lixisenatide (SOLIQUA 100/33) 100 unit-33 mcg/mL InPn Inject 34 Units into the skin once daily.      metOLazone (ZAROXOLYN) 5 MG tablet Take 1 tablet (5 mg total) by mouth every Monday and Thursday. 30 minutes before lasix.  Qty: 30 tablet, Refills: 2      miconazole NITRATE 2 % (MICOTIN) 2 % top powder Apply topically 2 (two) times daily.  Refills: 0      MULTIVIT,THER IRON,CA,FA & MIN (MULTIVITAMIN) Tab Take 1 tablet by mouth every morning.      "  oxyCODONE-acetaminophen (PERCOCET) 5-325 mg per tablet Take 1 tablet by mouth every 6 (six) hours as needed for Pain.  Qty: 45 tablet, Refills: 0      pen needle, diabetic 31 gauge x 1/4" Ndle Use as directed  Qty: 100 each, Refills: 11    Associated Diagnoses: DM type 2 with diabetic peripheral neuropathy      insulin degludec-liraglutide (XULTOPHY 100/3.6) 100 unit-3.6 mg /mL (3 mL) InPn Inject 42 Units into the skin once daily.  Qty: 5 Syringe, Refills: 5    Comments: Dispense 30 day supply with 5 refills.         STOP taking these medications       furosemide (LASIX) 40 MG tablet Comments:   Reason for Stopping:               I certify that this patient is confined to his home and needs intermittent skilled nursing care, physical therapy and occupational therapy.        "

## 2019-03-12 NOTE — PROGRESS NOTES
Wound care consult received from MD team for Left foot wound. Pt known to wound care team for treatment of Left foot wound.  See assessment below.  Pt has been treating wound with Santyl ointment daily.  Will continue with this treatment as it is appropriate to enzymatically debride the slough from wound bed.  Pt planning d/c today.  [id number]       03/12/19 1720       Wound 11/02/18 Diabetic Ulcer plantar Foot   Date First Assessed: 11/02/18   Pre-existing: Yes  Primary Wound Type: (c) Diabetic Ulcer  Side: Left  Orientation: plantar  Location: Foot   Wound Image      Wound WDL ex   Dressing Appearance Intact   Drainage Amount Moderate   Drainage Characteristics/Odor Serosanguineous   Appearance Pink;Red;Slough;Moist;Bone   Tissue loss description Full thickness   Periwound Area Intact   Wound Edges Open   Wound Length (cm) 9.5 cm   Wound Width (cm) 40 cm   Wound Depth (cm) 1 cm   Wound Volume (cm^3) 380 cm^3   Wound Surface Area (cm^2) 380 cm^2   Care Cleansed with:;Sterile normal saline   Dressing Changed;Foam;Gauze;Abd pad;Cast padding  (santyl)   Dressing Change Due 03/13/19

## 2019-03-12 NOTE — PHARMACY MED REC
"Admission Medication Reconciliation - Pharmacy Consult Note    The home medication history was taken by Jessie Gutiérrez, Pharmacy Tech.     Based on information gathered and subsequent review by the clinical pharmacist, the items below may need attention.     You may go to "Admission" then "Reconcile Home Medications" tabs to review and/or act upon these items.     Potentially problematic discrepancies with current MAR  o Patient IS taking the following which was not ordered upon admit  o Brimonidine 0.2% 1 drop both eyes BID  o Dorzolamide-timolol 2-0.5% 1 trop both eyes BID (need to order these as separate products inpatient)  o Patient is taking a drug DIFFERENTLY than how ordered upon admit  o  mg PO daily    Potential issues to be addressed PRIOR TO DISCHARGE  o Of note, patient is currently on Soliqua and will transition to Xultophy     Please address this information as you see fit.  Feel free to contact us if you have any questions or require assistance.    Dov Baca, Pharm.D., BCPS  [id number]                    .    .            "

## 2019-03-12 NOTE — ASSESSMENT & PLAN NOTE
STARKEY and severe volume overload/anasarca which I suspect is secondary to dietary indiscretion, EKG unchanged, and no chest pain to suggest ischemia.     - Aggressive diuresis, given lasix 80 IV in ED, will given 120 mg IV with metolazone  - trying to assess for PO agent that pt responds to with trials of torsemide and possibly bumex  - Cardiac diet and fluid restrict   - Daily weights, strict ins and outs, dietary education    - Consider Cardiology consult in AM given complex history and severe volume overload.   - Patient compliant with Coreg will continue   -pt on room air  -torsemide 80mg BID  -cards referral and PCP apt needed for goal directed medical therapy

## 2019-03-12 NOTE — PLAN OF CARE
On Discharge planning assessment patient is in bed, resting quietly, sister at the bedside. Introduced myself and CM role in patients care plan, patient verbalized understanding.     Pt is currently living with his sister, because her home is handicap accessible, he has transportation at discharge.  Patient denies HD, and coumadin. Pt has Home Health with [facility]. Pt has a BSC, several walkers, several wheelchairs and cane. Verified Pts Address, Emergency Contact, Pharmacy and PCP.     Pt denies any concerns for this visit, CM will continue to follow. CM name and number placed on patients white board .        03/12/19 1525   Discharge Assessment   Assessment Type Discharge Planning Assessment   Confirmed/corrected address and phone number on facesheet? Yes   Assessment information obtained from? Patient   Expected Length of Stay (days) 3   Communicated expected length of stay with patient/caregiver yes   Prior to hospitilization cognitive status: Alert/Oriented   Prior to hospitalization functional status: Assistive Equipment   Current cognitive status: Alert/Oriented   Current Functional Status: Assistive Equipment   Lives With sibling(s)   Able to Return to Prior Arrangements yes   Is patient able to care for self after discharge? Yes   Who are your caregiver(s) and their phone number(s)? Chloé MenjivarIhgnea-zodoom-[phone]   Patient's perception of discharge disposition home health   Readmission Within the Last 30 Days no previous admission in last 30 days   Patient currently being followed by outpatient case management? No   Patient currently receives any other outside agency services? No   Equipment Currently Used at Home bedside commode;walker, rolling;walker, standard;rollator;glucometer;wheelchair;cane, straight;cane, quad;crutches   Do you have any problems affording any of your prescribed medications? No   Is the patient taking medications as prescribed? yes   Does the patient have transportation home? Yes    Transportation Anticipated family or friend will provide   Does the patient receive services at the [facility] Clinic? No   Discharge Plan A Home Health   Discharge Plan B Home with family   DME Needed Upon Discharge  none   Patient/Family in Agreement with Plan yes     Rubén Davis MD  [address] [place] [address] [phone]    Extended Emergency Contact Information  Primary Emergency Contact: Chloé Menjivar   [facility]  Home Phone: [phone]  Relation: Sister  Secondary Emergency Contact: sania david  Mobile Phone: [phone]  Relation: Sister      [facility] [address] [place] [address] RADHA GREEN [address] AT [address]  [place] [address]  Phone: [phone] Fax: [phone]

## 2019-03-12 NOTE — ASSESSMENT & PLAN NOTE
Paracentesis 3/12/19, can send diagnostic studies, likely secondary to heart failure, most recent CT and US shows no evidence of steatosis or cirrhosis

## 2019-03-12 NOTE — DISCHARGE SUMMARY
[facility] Hospital Medicine  Discharge Summary      Patient Name: Marvin Ray  MRN: [id number]  Admission Date: 3/10/2019  Hospital Length of Stay: 1 days  Discharge Date and Time:  03/12/2019 3:59 PM  Attending Physician: April Pugh MD   Discharging Provider: Francisca Echeverria MD  Primary Care Provider: Rubén Davis MD  Hospital Medicine Team: [facility] HOSP MED 2 Francisca Echeverria MD    HPI:   58 year old male with DM2, CKD, HTN, CAD complicated by ischemic CHF ef 20% by recent echo w/ ICD placement, PVD, and recently diagnosed osteomyelitis of left lower extremity s/p amputation of digits, currently on IV antibiotics who presents to the ED with chief complaint of worsening abdominal distension and SOB.  Patient is accompanied by his sister. Patient reports worsening abdominal distension and STARKEY for the past 2 weeks.  He attribtues the fluid build up with dietary indiscretion (campbels soup) in addition to IV fluids from his abx.  Regarding his abdominal distension he reports a recent abdominal paracentesis with 10 L removed on 03/01.  He presented to the hospital today primarily for his SOB which resolved with 2L of NC.  He states he was supposed to be schedule for routine paracentesis, but so far has not heard from anyone.  He was scheduled to follow up with Dr. West on Monday regarding his left wound/osteomyeltits and abx management.  Patient otherwise states he is in his normal state of health and is upset that he is being admitted to the hospital.  He currently denies  fever or chills, chest pain, palpitations, change in BM, or abdominal pain.      Patient has been taking his medications as directed.  He admits to some dietary indiscretion with canned goods and still has good UOP.  At home he can walk without assistance and accomplish he ADLs with minimal assistance         * No surgery found *      Hospital Course:   Pt admitted to hospital medicine for CHF exacerbation.  Paracentesis 3/11/19 removed 6.5 L, albumin 50g (25%) given.   3/12/19: trial of torsemide 60mg resulted in no diuresis. Will increase dose for trial and if no response will consider bumex.      Consults:   Consults (From admission, onward)        Status Ordering Provider     Inpatient consult to Infectious Diseases  Once     Provider:  (Not yet assigned)    Completed MARTHA BRYANT          No new Assessment & Plan notes have been filed under this hospital service since the last note was generated.  Service: Hospital Medicine    Final Active Diagnoses:    Diagnosis Date Noted POA    PRINCIPAL PROBLEM:  Acute on chronic combined systolic and diastolic congestive heart failure [I50.43] 03/11/2019 Yes    Stage 3 chronic kidney disease [N18.3] 03/11/2019 Yes    Ascites [R18.8] 02/11/2019 Yes    Subacute osteomyelitis of left foot [M86.272] 01/14/2019 Yes    Infection due to multidrug-resistant Pseudomonas aeruginosa [A49.8, Z16.24]  Yes    MDR Acinetobacter baumannii infection [A49.9, Z16.24] 12/20/2018 Yes    Goals of care, counseling/discussion [Z71.89] 12/14/2018 Not Applicable    Multiple open wounds of lower leg [S81.809A] 10/11/2018 Yes    PVD (peripheral vascular disease) [I73.9] 10/10/2018 Yes     Chronic    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 Schedule an appointment as soon as possible for a visit in 1 week.     Specialty:  Family Medicine  Contact information:  [place] [address]  [phone]             [facility] CARDIOLOGY.    Specialty:  Cardiology  Why:  for optimization of goal directed therapy  Contact information:  [address] [place]  [facility] [id number] [phone]               Patient Instructions:      Ambulatory referral to Cardiology   Referral Priority: Routine Referral Type: Consultation   Referral Reason: Specialty Services Required   Requested Specialty: Cardiology   Number of Visits Requested: 1       Significant Diagnostic Studies: Labs:   CMP   Recent Labs   Lab 03/10/19  2349 03/11/19 0335 03/11/19 2148 03/12/19  0558    133* 135* 138   K 3.2* 3.4* 3.1* 3.2*   CL 97 97 98 102   CO2 28 27 28 28   * 143* 124* 78   BUN 72* 74* 70* 71*   CREATININE 1.6* 1.5* 1.3 1.2   CALCIUM 8.9 8.6* 8.9 8.9   PROT 6.7 6.7  --  5.8*   ALBUMIN 2.1* 2.1*  --  2.3*   BILITOT 0.3 0.3  --  0.4   ALKPHOS 96 96  --  74   AST 20 15  --  13   ALT 12 12  --  8*   ANIONGAP 11 9 9 8   ESTGFRAFRICA 53.3* 57.7* >60.0 >60.0   EGFRNONAA 46.1* 49.9* 59.3* >60.0   , CBC   Recent Labs   Lab 03/11/19 0335 03/11/19 2148 03/12/19  0558   WBC 7.98 6.13 6.31   HGB 9.6* 8.8* 8.8*   HCT 31.1* 28.0* 28.9*    220 232    and All labs within the past 24 hours have been reviewed    Pending Diagnostic Studies:     None         Medications:  Reconciled Home Medications:      Medication List      START taking these medications    dextrose 5 % SolP 100 mL with ceftolozane-tazobactam 1.5 gram SolR 3,000 mg injection  Inject 3,000 mg into the vein every 8 (eight) hours. for 21 days     losartan 25 MG tablet  Commonly known as:  COZAAR  Take 1 tablet (25 mg total) by mouth once daily.     torsemide 20 MG Tab  Commonly known as:  DEMADEX  Take 4 tablets (80 mg total) by mouth 2 (two) times daily.        CONTINUE taking these medications    allopurinol 300 MG tablet  Commonly known as:  ZYLOPRIM  Take 1 tablet  "(300 mg total) by mouth once daily.     aspirin 325 MG tablet  Take 325 mg by mouth once daily.     bisacodyl 5 mg EC tablet  Commonly known as:  DULCOLAX  Take 5 mg by mouth daily as needed for Constipation.     brimonidine 0.2% 0.2 % Drop  Commonly known as:  ALPHAGAN  Place 1 drop into both eyes 2 (two) times daily.     carvedilol 3.125 MG tablet  Commonly known as:  COREG  Take 1 tablet (3.125 mg total) by mouth 2 (two) times daily.     coenzyme Q10 100 mg capsule  Take 100 mg by mouth every morning.     dorzolamide-timolol 2-0.5% 22.3-6.8 mg/mL ophthalmic solution  Commonly known as:  COSOPT  Place 1 drop into both eyes 2 (two) times daily.     ezetimibe 10 mg tablet  Commonly known as:  ZETIA  Take 1 tablet (10 mg total) by mouth once daily.     fish oil-omega-3 fatty acids 300-1,000 mg capsule  Take 1 capsule by mouth 2 (two) times daily.     insulin aspart U-100 100 unit/mL Inpn pen  Commonly known as:  NovoLOG  Use on premeal readings: 150-200=+2, 201-250=+4; 251-300=+6; 301-350=+8, over 350=+10 units     insulin degludec-liraglutide 100 unit-3.6 mg /mL (3 mL) Inpn  Commonly known as:  XULTOPHY 100/3.6  Inject 42 Units into the skin once daily.     metOLazone 5 MG tablet  Commonly known as:  ZAROXOLYN  Take 1 tablet (5 mg total) by mouth every Monday and Thursday. 30 minutes before lasix.     miconazole NITRATE 2 % 2 % top powder  Commonly known as:  MICOTIN  Apply topically 2 (two) times daily.     multivitamin Tab  Take 1 tablet by mouth every morning.     oxyCODONE-acetaminophen 5-325 mg per tablet  Commonly known as:  PERCOCET  Take 1 tablet by mouth every 6 (six) hours as needed for Pain.     pen needle, diabetic 31 gauge x 1/4" Ndle  Use as directed     SOLIQUA 100/33 100 unit-33 mcg/mL Inpn  Generic drug:  insulin glargine-lixisenatide  Inject 34 Units into the skin once daily.        STOP taking these medications    furosemide 40 MG tablet  Commonly known as:  LASIX            Indwelling " Lines/Drains at time of discharge:   Lines/Drains/Airways     Peripherally Inserted Central Catheter Line                 PICC Double Lumen 02/20/19 1108 right brachial 20 days                Time spent on the discharge of patient: 45 minutes  Patient was seen and examined on the date of discharge and determined to be suitable for discharge.         Francisca Echeverria MD  Department of Hospital Medicine  [facility]

## 2019-03-12 NOTE — PLAN OF CARE
Problem: Adult Inpatient Plan of Care  Goal: Plan of Care Review  Outcome: Ongoing (interventions implemented as appropriate)  Greeted patient and gained consent for nursing care. POC reviewed, verbalized understanding. Used wheelchair with assistance in going to restroom. No complaint of pain. No complaint of shortness of breath. Assisted in his needs. Will continue to monitor.

## 2019-03-12 NOTE — SUBJECTIVE & OBJECTIVE
"Interval History: Pt's breathing has improved and he is no longer on O2. Pt still has significant edema. Trials of PO diuretics to assess for effectiveness today.    Review of Systems   Constitutional: Negative for chills, diaphoresis and fever.   HENT: Negative for rhinorrhea and sore throat.    Eyes: Negative for pain and visual disturbance.   Respiratory: Negative for cough and shortness of breath (now at baseline).    Cardiovascular: Positive for leg swelling (decreased from yesterday). Negative for chest pain and palpitations.   Gastrointestinal: Positive for abdominal distention (decreased from yesterday "but there's still more to be taken off"). Negative for abdominal pain, constipation, diarrhea, nausea and vomiting.   Genitourinary: Negative for decreased urine volume, dysuria and hematuria.   Musculoskeletal: Negative for arthralgias and myalgias.   Skin: Positive for wound (left foot). Negative for color change and rash.   Neurological: Positive for weakness. Negative for syncope and headaches.   All other systems reviewed and are negative.    Objective:     Vital Signs (Most Recent):  Temp: 98.5 °F (36.9 °C) (03/12/19 1126)  Pulse: 89 (03/12/19 1137)  Resp: 18 (03/12/19 1126)  BP: 122/70 (03/12/19 1126)  SpO2: (!) 92 % (03/12/19 1126) Vital Signs (24h Range):  Temp:  [97.5 °F (36.4 °C)-98.5 °F (36.9 °C)] 98.5 °F (36.9 °C)  Pulse:  [54-95] 89  Resp:  [16-18] 18  SpO2:  [92 %-100 %] 92 %  BP: (106-141)/(56-79) 122/70     Weight: 100 kg (220 lb 6.4 oz)  Body mass index is 31.62 kg/m².    Physical Exam   Constitutional: He is oriented to person, place, and time. He appears well-developed and well-nourished. No distress.   HENT:   Head: Normocephalic and atraumatic.   Eyes: Conjunctivae and EOM are normal.   Neck: Normal range of motion. Neck supple.   Cardiovascular: Normal rate and regular rhythm.   Murmur (S3) heard.  Pulmonary/Chest: Effort normal. No respiratory distress. He has no rales.   Abdominal: " Soft. He exhibits distension. There is no tenderness.   Musculoskeletal: Normal range of motion. He exhibits edema (tense edema up to his hips, bilaterally).   Left foot in dressing   Neurological: He is alert and oriented to person, place, and time.   Skin: Skin is warm and dry. No rash noted. He is not diaphoretic. No erythema.   Psychiatric: He has a normal mood and affect. His behavior is normal.   Nursing note and vitals reviewed.        CRANIAL NERVES     CN III, IV, VI   Extraocular motions are normal.        Significant Labs: All pertinent labs within the past 24 hours have been reviewed.    Significant Imaging: I have reviewed all pertinent imaging results/findings within the past 24 hours.

## 2019-03-12 NOTE — PROGRESS NOTES
Patient received discharge instructions and verbalized understanding. PICC line to remain in place. Patient disconnected from telemetry monitor. Patient in room waiting on ride home. Will continue to monitor.

## 2019-03-12 NOTE — TELEPHONE ENCOUNTER
Called pt and pt sister answered and said that pt was in the hospital and being discharged today and that unless things changed then pt would be at scheduled appt . Pt verbalized understanding.

## 2019-03-12 NOTE — PROGRESS NOTES
[facility] Hospital Medicine  Progress Note    Patient Name: Marvin Ray  MRN: [id number]  Patient Class: IP- Inpatient   Admission Date: 3/10/2019  Length of Stay: 1 days  Attending Physician: April Pugh MD  Primary Care Provider: Rubén Davis MD    Hospital Medicine Team: [facility] HOSP MED 2 Francisca Echeverria MD    Subjective:     Principal Problem:Acute on chronic combined systolic and diastolic congestive heart failure    HPI:  58 year old male with DM2, CKD, HTN, CAD complicated by ischemic CHF ef 20% by recent echo w/ ICD placement, PVD, and recently diagnosed osteomyelitis of left lower extremity s/p amputation of digits, currently on IV antibiotics who presents to the ED with chief complaint of worsening abdominal distension and SOB.  Patient is accompanied by his sister. Patient reports worsening abdominal distension and STARKEY for the past 2 weeks.  He attribtues the fluid build up with dietary indiscretion (campbels soup) in addition to IV fluids from his abx.  Regarding his abdominal distension he reports a recent abdominal paracentesis with 10 L removed on 03/01.  He presented to the hospital today primarily for his SOB which resolved with 2L of NC.  He states he was supposed to be schedule for routine paracentesis, but so far has not heard from anyone.  He was scheduled to follow up with Dr. West on Monday regarding his left wound/osteomyeltits and abx management.  Patient otherwise states he is in his normal state of health and is upset that he is being admitted to the hospital.  He currently denies  fever or chills, chest pain, palpitations, change in BM, or abdominal pain.      Patient has been taking his medications as directed.  He admits to some dietary indiscretion with canned goods and still has good UOP.  At home he can walk without assistance and accomplish he ADLs with minimal assistance         Hospital Course:  Pt admitted to hospital medicine for CHF  "exacerbation. Paracentesis 3/11/19 removed 6.5 L, albumin 50g (25%) given.   3/12/19: trial of torsemide 60mg resulted in no diuresis. Will increase dose for trial and if no response will consider bumex.     Interval History: Pt's breathing has improved and he is no longer on O2. Pt still has significant edema. Trials of PO diuretics to assess for effectiveness today.    Review of Systems   Constitutional: Negative for chills, diaphoresis and fever.   HENT: Negative for rhinorrhea and sore throat.    Eyes: Negative for pain and visual disturbance.   Respiratory: Negative for cough and shortness of breath (now at baseline).    Cardiovascular: Positive for leg swelling (decreased from yesterday). Negative for chest pain and palpitations.   Gastrointestinal: Positive for abdominal distention (decreased from yesterday "but there's still more to be taken off"). Negative for abdominal pain, constipation, diarrhea, nausea and vomiting.   Genitourinary: Negative for decreased urine volume, dysuria and hematuria.   Musculoskeletal: Negative for arthralgias and myalgias.   Skin: Positive for wound (left foot). Negative for color change and rash.   Neurological: Positive for weakness. Negative for syncope and headaches.   All other systems reviewed and are negative.    Objective:     Vital Signs (Most Recent):  Temp: 98.5 °F (36.9 °C) (03/12/19 1126)  Pulse: 89 (03/12/19 1137)  Resp: 18 (03/12/19 1126)  BP: 122/70 (03/12/19 1126)  SpO2: (!) 92 % (03/12/19 1126) Vital Signs (24h Range):  Temp:  [97.5 °F (36.4 °C)-98.5 °F (36.9 °C)] 98.5 °F (36.9 °C)  Pulse:  [54-95] 89  Resp:  [16-18] 18  SpO2:  [92 %-100 %] 92 %  BP: (106-141)/(56-79) 122/70     Weight: 100 kg (220 lb 6.4 oz)  Body mass index is 31.62 kg/m².    Physical Exam   Constitutional: He is oriented to person, place, and time. He appears well-developed and well-nourished. No distress.   HENT:   Head: Normocephalic and atraumatic.   Eyes: Conjunctivae and EOM are " normal.   Neck: Normal range of motion. Neck supple.   Cardiovascular: Normal rate and regular rhythm.   Murmur (S3) heard.  Pulmonary/Chest: Effort normal. No respiratory distress. He has no rales.   Abdominal: Soft. He exhibits distension. There is no tenderness.   Musculoskeletal: Normal range of motion. He exhibits edema (tense edema up to his hips, bilaterally).   Left foot in dressing   Neurological: He is alert and oriented to person, place, and time.   Skin: Skin is warm and dry. No rash noted. He is not diaphoretic. No erythema.   Psychiatric: He has a normal mood and affect. His behavior is normal.   Nursing note and vitals reviewed.        CRANIAL NERVES     CN III, IV, VI   Extraocular motions are normal.        Significant Labs: All pertinent labs within the past 24 hours have been reviewed.    Significant Imaging: I have reviewed all pertinent imaging results/findings within the past 24 hours.    Assessment/Plan:      * Acute on chronic combined systolic and diastolic congestive heart failure    STARKEY and severe volume overload/anasarca which I suspect is secondary to dietary indiscretion, EKG unchanged, and no chest pain to suggest ischemia.     - Aggressive diuresis, given lasix 80 IV in ED, will given 120 mg IV with metolazone  - trying to assess for PO agent that pt responds to with trials of torsemide and possibly bumex  - Cardiac diet and fluid restrict   - Daily weights, strict ins and outs, dietary education    - Consider Cardiology consult in AM given complex history and severe volume overload.   - Patient compliant with Coreg will continue   -pt on room air     Stage 3 chronic kidney disease    Creatine and BUN elevated but around baseline, closely monitor function and UOP, If creatine worsens would obtain UA and lytes and consider changing abx dosing.         Ascites    Paracentesis in AM, can send diagnostic studies, likely secondary to heart failure, most recent CT and US shows no evidence  of steatosis or cirrhosis     Subacute osteomyelitis of left foot    + Left Foot wound s/p debridement 2/13/19, multifactorial due to diabetes, PVD and venous insufficiency followed by Dr. Chan  - Continue Cefazolin tazobactam 3g Q8 Per Dr. 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      Essential hypertension    Continue Carvedilol 3.125, not on ACE or ARB likely would benefit in future.        Tophaceous gout    Continue allopurinol, 300 mg ordered       HLD (hyperlipidemia)    -continue home ezetimibe         VTE Risk Mitigation (From admission, onward)        Ordered     IP VTE HIGH RISK PATIENT  Once      03/11/19 0204              Francisca Echeverria MD  Department of Hospital Medicine   [facility]

## 2019-03-13 ENCOUNTER — OFFICE VISIT (OUTPATIENT)
Dept: PLASTIC SURGERY | Facility: CLINIC | Age: 59
End: 2019-03-13
Payer: COMMERCIAL

## 2019-03-13 VITALS
WEIGHT: 220.44 LBS | SYSTOLIC BLOOD PRESSURE: 137 MMHG | HEART RATE: 82 BPM | DIASTOLIC BLOOD PRESSURE: 59 MMHG | BODY MASS INDEX: 31.63 KG/M2

## 2019-03-13 DIAGNOSIS — R23.9 ALTERATION IN SKIN INTEGRITY: Primary | ICD-10-CM

## 2019-03-13 PROCEDURE — 99204 OFFICE O/P NEW MOD 45 MIN: CPT | Mod: S$GLB,,, | Performed by: SURGERY

## 2019-03-13 PROCEDURE — 3008F PR BODY MASS INDEX (BMI) DOCUMENTED: ICD-10-PCS | Mod: CPTII,S$GLB,, | Performed by: SURGERY

## 2019-03-13 PROCEDURE — 3078F PR MOST RECENT DIASTOLIC BLOOD PRESSURE < 80 MM HG: ICD-10-PCS | Mod: CPTII,S$GLB,, | Performed by: SURGERY

## 2019-03-13 PROCEDURE — 3078F DIAST BP <80 MM HG: CPT | Mod: CPTII,S$GLB,, | Performed by: SURGERY

## 2019-03-13 PROCEDURE — 3075F SYST BP GE 130 - 139MM HG: CPT | Mod: CPTII,S$GLB,, | Performed by: SURGERY

## 2019-03-13 PROCEDURE — 3075F PR MOST RECENT SYSTOLIC BLOOD PRESS GE 130-139MM HG: ICD-10-PCS | Mod: CPTII,S$GLB,, | Performed by: SURGERY

## 2019-03-13 PROCEDURE — 99999 PR PBB SHADOW E&M-EST. PATIENT-LVL III: ICD-10-PCS | Mod: PBBFAC,,, | Performed by: SURGERY

## 2019-03-13 PROCEDURE — 99204 PR OFFICE/OUTPT VISIT, NEW, LEVL IV, 45-59 MIN: ICD-10-PCS | Mod: S$GLB,,, | Performed by: SURGERY

## 2019-03-13 PROCEDURE — 3008F BODY MASS INDEX DOCD: CPT | Mod: CPTII,S$GLB,, | Performed by: SURGERY

## 2019-03-13 PROCEDURE — 99999 PR PBB SHADOW E&M-EST. PATIENT-LVL III: CPT | Mod: PBBFAC,,, | Performed by: SURGERY

## 2019-03-13 NOTE — LETTER
March 17, 2019      Mitch Chan MD  [address] [facility] [address]  [address]  [place] [address]           [facility] - Plastic Surg 2nd Fl  [address]  [facility] [address]  Phone: [phone]  Fax: [phone]          Patient: Marvin Ray   MR Number: [id number]   YOB: 1960   Date of Visit: 3/13/2019       Dear Dr. Mitch Chan:    Thank you for referring Marvin Ray to me for evaluation. Attached you will find relevant portions of my assessment and plan of care.    If you have questions, please do not hesitate to call me. I look forward to following Marvin Ray along with you.    Sincerely,    Leonidas Diaz MD    Enclosure  CC:  No Recipients    If you would like to receive this communication electronically, please contact [email or web address] or [phone] to request more information on [employer] access.    For providers and/or their staff who would like to refer a patient to [facility], please contact us through our one-stop-shop provider referral line, [facility] [place], at [phone].    If you feel you have received this communication in error or would no longer like to receive these types of communications, please e-mail [email or web address]

## 2019-03-14 ENCOUNTER — PATIENT OUTREACH (OUTPATIENT)
Dept: ADMINISTRATIVE | Facility: CLINIC | Age: 59
End: 2019-03-14

## 2019-03-14 LAB — BACTERIA SPEC AEROBE CULT: NO GROWTH

## 2019-03-14 NOTE — PATIENT INSTRUCTIONS

## 2019-03-15 NOTE — ADDENDUM NOTE
Addendum  created 03/15/19 0808 by Zulema Hernández MD    Intraprocedure Blocks edited, Sign clinical note

## 2019-03-16 DIAGNOSIS — E78.5 HYPERLIPIDEMIA LDL GOAL <70: ICD-10-CM

## 2019-03-17 NOTE — PROGRESS NOTES
PLASTIC & RECONSTRUCTIVE CONSULTATION NOTE    CC  No chief complaint on file.  Right foot wounds, chronic    Referring Provider- Mitch Cox  PCP: Rubén Davis MD    HPI  History from patient, chart, referring provider  Marvin Ray is a 58 y.o. male presenting with left foot wounds.  He has had multiple debridements since last fall by vascular surgery.  He has been doing dressing changes to left  foot and is hopeful that he can heal his foot wounds so that he can walk.  He had a recent admission for heart failure.  His last debridement in the OR was 2/19.  He said he has a preference for not proceeding with amputation if at all possible.      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     Debility 12/25/2018    Alteration in skin integrity 12/20/2018    MDR Acinetobacter baumannii infection 12/20/2018    Takes dietary supplements 12/20/2018    Intertriginous dermatitis associated with moisture 12/20/2018    Goals of care, counseling/discussion 12/14/2018    Other ascites 12/12/2018    Severe malnutrition 12/12/2018    Diabetic foot infection 12/10/2018    Cellulitis of left lower extremity 11/19/2018    Open wound of left foot 11/08/2018    Type 2 diabetes mellitus with left diabetic foot ulcer 10/29/2018    Non-pressure chronic ulcer of other part of left foot with fat layer exposed 10/23/2018    Hepatosplenomegaly 10/12/2018    Right wrist pain 10/11/2018    Multiple open wounds of lower leg 10/11/2018    Left leg cellulitis 10/10/2018    PVD (peripheral vascular disease) 10/10/2018    Neovascular glaucoma, right eye, mild stage 10/09/2018    Statin myopathy 07/27/2018    Neovascular glaucoma of  left eye, severe stage 02/15/2017    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 edema, with long-term current use of insulin 08/25/2016    CAD (coronary artery disease) 05/31/2016    Essential hypertension 05/06/2016    Tophaceous gout 10/22/2015    Neovascular glaucoma of both eyes 03/20/2015    DM type 2 with diabetic peripheral neuropathy 10/22/2014    HLD (hyperlipidemia) 10/22/2014    Nuclear sclerotic cataract of right eye 08/28/2014    Pseudophakia, left eye 08/28/2014    Ptosis, left eyelid 08/28/2014    Epiretinal membrane, both eyes 08/22/2014       PSH  Past Surgical History:   Procedure Laterality Date    AHMED GLAUCOMA IMPLANT Left 2011    DONE AT [facility]    AMPUTATION, TOE Left 12/5/2018    [facility] at [facility] OR    AMPUTATION, TOES  2-5 Left 11/16/2018    [facility]formed by [facility] at [facility] OR    BAERVELDT GLAUCOMA IMPLANT Left 2012    WITH CATARACT EXTRACTION//DONE AT [facility]    CARDIAC CATHETERIZATION Left 05/2016    CARDIAC DEFIBRILLATOR PLACEMENT Left 11/02/2018    CATARACT EXTRACTION W/  INTRAOCULAR LENS IMPLANT Left 2012    WITH BAERVEDT//DONE AT [facility]    CATARACT EXTRACTION W/  INTRAOCULAR LENS IMPLANT Right 09/26/2018    COMPLEX ()    CB DESTRUCTION WITH CYCLO G6 Left 02/15/2017        CYST REMOVAL      DEBRIDEMENT, FOOT  12/28/2018    Performed by [facility] at [facility] OR    Exam under anesthesia, left foot debridement, left foot washout, possible left second through fifth metatarsal resection Left 12/28/2018    Performed by Mitch Chan [facility] at [facility] OR    Exam under anesthesia, left foot debridement, washout and all other indicated procedures Left 12/5/2018    Performed by [facility] at [facility] OR    EXCISION, LESION, METATARSAL BONE  12/28/2018    Performed by [facility] at [facility] OR    HEART CATH-LEFT N/A  5/6/2016    Performed by Mike Magana MD at [facility] CATH LAB    INCISION AND DRAINAGE Left 11/16/2018    Performed by [facility] MD at [facility] OR    Incision and Drainage, left lower extremity debridement, washout Left 11/14/2018    Performed by Mitch Chan MD at [facility] OR    INSERTION, ICD GENERATOR, SINGLE CHAMBER N/A 11/2/2018    Performed by Pernell Greer MD at [facility] CATH LAB    INSERTION, IOL PROSTHESIS Right 9/26/2018    Performed by Perla Cortés MD at [facility] OR 1ST FLR    LEFT FOOT WOUND DEBRIDEMENT, WASHOUT AND ALL OTHER INDICATED PROCEDURES Left 2/13/2019    Performed by Mitch Chan MD at [facility] OR    PARACENTESIS, ABDOMINAL, INITIAL N/A 3/1/2019    Performed by [facility] at [facility] OR    PARACENTESIS, ABDOMINAL, REPEAT N/A 2/11/2019    Performed by [facility] at [facility] OR    PHACOEMULSIFICATION, CATARACT Right 9/26/2018    Performed by Perla Cortés MD at [facility] OR 1ST FLR    Right foot surgery  10/2014    TOE AMPUTATION Right     first and second    TONSILLECTOMY      TRABECULECTOMY/G 6 LASER Left 2/15/2017    Performed by Perla Cortés MD at [facility] OR 1ST FLR       FH  Family History   Problem Relation Age of Onset    Diabetes Mother     Heart disease Brother     No Known Problems Father     No Known Problems Sister     No Known Problems Maternal Aunt     No Known Problems Maternal Uncle     No Known Problems Paternal Aunt     No Known Problems Paternal Uncle     No Known Problems Maternal Grandmother     No Known Problems Maternal Grandfather     No Known Problems Paternal Grandmother     No Known Problems Paternal Grandfather     Anemia Neg Hx     Arrhythmia Neg Hx     Asthma Neg Hx     Clotting disorder Neg Hx     Fainting Neg Hx     Heart attack Neg Hx     Heart failure Neg Hx     Hyperlipidemia Neg Hx     Hypertension Neg Hx     Stroke Neg Hx     Atrial Septal Defect Neg Hx     Amblyopia Neg Hx      Blindness Neg Hx     Glaucoma Neg Hx     Macular degeneration Neg Hx     Retinal detachment Neg Hx     Strabismus Neg Hx        MEDICATIONS  Outpatient Medications Marked as Taking for the 3/13/19 encounter (Office Visit) with Leonidas Diaz MD   Medication Sig Dispense Refill    allopurinol (ZYLOPRIM) 300 MG tablet Take 1 tablet (300 mg total) by mouth once daily. 30 tablet 3    aspirin 325 MG tablet Take 325 mg by mouth once daily.       bisacodyl (DULCOLAX) 5 mg EC tablet Take 5 mg by mouth daily as needed for Constipation.      brimonidine 0.2% (ALPHAGAN) 0.2 % Drop Place 1 drop into both eyes 2 (two) times daily. 10 mL 11    carvedilol (COREG) 3.125 MG tablet Take 1 tablet (3.125 mg total) by mouth 2 (two) times daily. 60 tablet 11    coenzyme Q10 100 mg capsule Take 100 mg by mouth every morning.      dextrose 5 % SolP 100 mL with ceftolozane-tazobactam 1.5 gram SolR 3,000 mg injection Inject 3,000 mg into the vein every 8 (eight) hours. for 21 days      dorzolamide-timolol 2-0.5% (COSOPT) 22.3-6.8 mg/mL ophthalmic solution Place 1 drop into both eyes 2 (two) times daily. 1 Bottle 11    ezetimibe (ZETIA) 10 mg tablet Take 1 tablet (10 mg total) by mouth once daily. 30 tablet 2    fish oil-omega-3 fatty acids 300-1,000 mg capsule Take 1 capsule by mouth 2 (two) times daily. 60 capsule 3    insulin aspart U-100 (NOVOLOG) 100 unit/mL InPn pen Use on premeal readings: 150-200=+2, 201-250=+4; 251-300=+6; 301-350=+8, over 350=+10 units 2.7 mL 11    insulin degludec-liraglutide (XULTOPHY 100/3.6) 100 unit-3.6 mg /mL (3 mL) InPn Inject 42 Units into the skin once daily. 5 Syringe 5    insulin glargine-lixisenatide (SOLIQUA 100/33) 100 unit-33 mcg/mL InPn Inject 34 Units into the skin once daily.      losartan (COZAAR) 25 MG tablet Take 1 tablet (25 mg total) by mouth once daily. 90 tablet 3    metOLazone (ZAROXOLYN) 5 MG tablet Take 1 tablet (5 mg total) by mouth every Monday and Thursday. 30  "minutes before lasix. 30 tablet 2    miconazole NITRATE 2 % (MICOTIN) 2 % top powder Apply topically 2 (two) times daily.  0    MULTIVIT,THER IRON,CA,FA & MIN (MULTIVITAMIN) Tab Take 1 tablet by mouth every morning.       oxyCODONE-acetaminophen (PERCOCET) 5-325 mg per tablet Take 1 tablet by mouth every 6 (six) hours as needed for Pain. 45 tablet 0    pen needle, diabetic 31 gauge x 1/4" Ndle Use as directed 100 each 11    torsemide (DEMADEX) 20 MG Tab Take 4 tablets (80 mg total) by mouth 2 (two) times daily. 120 tablet 0       ALLERGIES   Review of patient's allergies indicates:   Allergen Reactions    Statins-hmg-coa reductase inhibitors      Generalized Pain    Onglyza [saxagliptin]     Penicillins Rash       SOCIAL HISTORY  Tobacco:   Social History     Tobacco Use   Smoking Status Former Smoker    Packs/day: 1.00    Years: 3.00    Pack years: 3.00    Types: Cigarettes    Last attempt to quit: 1984    Years since quittin.7   Smokeless Tobacco Never Used     EtOH:   Social History     Substance and Sexual Activity   Alcohol Use Yes    Alcohol/week: 0.6 oz    Types: 1 Cans of beer per week    Comment: Occasional       ROS  Pertinent otherwise negative except per HPI and intake form    PHYSICAL EXAM  BP (!) 137/59   Pulse 82   Wt 100 kg (220 lb 7.4 oz)   BMI 31.63 kg/m²     Vitals:    19 1406   BP: (!) 137/59   Pulse: 82   Weight: 100 kg (220 lb 7.4 oz)       Height:   Ht Readings from Last 1 Encounters:   19 5' 10" (1.778 m)       Weight:   Wt Readings from Last 1 Encounters:   19 100 kg (220 lb 7.4 oz)       Body mass index is 31.63 kg/m².    Gen: Physical examination reveals a well developed, well nourished male of good mood who is alert and is oriented to person, place, time.    HEENT: Head is normocephalic and atraumatic. Extraocular motion is intact. Mucosal membranes moist.    Pulm: Breathing is non-labored, normal respiratory effort    CV: Palpable peripheral " pulses    Extremities: No cyanosis, edematous thigh and leg compartments bilaterally    Musculoskeletal: In wheelchair, non-ambulatory    Skin: Normal turgor    GI: Abdomen Soft, Non-tender. Moderate lipodystrophy.    Left foot with transmetatarsal level amputation  Islands of granulation tissue interspersed with yellow, non-viable soft tissue  Palpable metatarsals and calcaneus with very thin layer of soft tissue over it  Wounds extend onto the plantar surface of his foot and involve part of the load bearing surface of the calcaneus    LABS  Lab Results   Component Value Date    WBC 6.31 03/12/2019    HGB 8.8 (L) 03/12/2019    HCT 28.9 (L) 03/12/2019    MCV 86 03/12/2019     03/12/2019     Lab Results   Component Value Date     03/12/2019    K 3.2 (L) 03/12/2019     03/12/2019    CO2 28 03/12/2019     Lab Results   Component Value Date    ALBUMIN 2.3 (L) 03/12/2019     Lab Results   Component Value Date    CREATININE 1.2 03/12/2019     Lab Results   Component Value Date    HGBA1C 6.7 (H) 03/11/2019     Reviewed the results of his January 2019 study  EXAMINATION:  US ARTERIAL LOWER EXTREMITY BILAT WITH SIDRA (XPD)    CLINICAL HISTORY:  osteomyelitis of left foot;    TECHNIQUE:  Ultrasound examination of the bilateral lower extremity arterial vasculature.  Color flow and Doppler techniques were used.  ABIs not performed secondary to lower extremity wounds.    COMPARISON:  Bilateral arterial lower extremity ultrasound 10/11/2018    FINDINGS:  Right lower extremity.    CFA : 74 cm/sec, triphasic    Prox SFA: 77 cm/sec, triphasic    Mid SFA : 119 cm/sec, monophasic    Dist SFA : 80 cm/sec, monophasic    Pop A: : 110 cm/sec, monophasic    ISAC: 27 cm/sec, monophasic    PTA: 77 cm/sec, monophasic    Left lower extremity    CFA: 118 cm/sec, monophasic    Prox SFA: 99 cm/sec, monophasic    Mid SFA: 68 cm/sec, monophasic    Dist SFA: 82 cm/sec, monophasic    Pop A: 90 cm/sec, monophasic    ISAC: 86 cm/sec,  monophasic    PTA: 62 cm/sec, monophasic      Impression       Findings consistent with bilateral lower extremity atherosclerosis without hemodynamically significant stenosis.    ABIs not performed secondary to lower extremity wounds.    Electronically signed by resident: Arian Warner  Date: 01/11/2019  Time: 16:34         ASSESSMENT  1.  Peripheral vascular disease with monophasic signals in his left foot  2.  History of multiple debridements in last year  3.  History of osteomyelitis in his left foot  4.  History of transmetatarsal level amputation left foot  5.  Wounds extend onto load bearing surface of calcaneus      PLAN    Not a candidate for reconstruction from my standpoint.  If he is currently optimized from an arterial inflow standpoint and since he has endured several debridements by vascular surgery, I do not expect that skin grafting would permit him to return to a higher level of function.  And there is not adequate soft tissue in my opinion to receive a graft at his TM stump sites or his calcaneus.       All the same, skin grafting would not provide an acceptably durable surface for him to be able to perform any meaningful activities with this leg.  If he had adequately protective sensation of his foot without arthropathy and in tact plantar skin, I would consider offering a free tissue transfer over his stump.      I discussed the possibility of amputation with him.  I am not certain at which level he would be offered an amputation at this point.      60 minutes of face to face time, of which greater than fifty percent of the total visit was  counseling/coordinating care      Plastic & Reconstructive Surgery  Microsurgery  [facility]  c/o Leonidas Diaz M.D.  [facility]  [address]  [address] [place] [address]    Work [phone]  Toll free [phone]  If no answer [phone]

## 2019-03-18 ENCOUNTER — LAB VISIT (OUTPATIENT)
Dept: LAB | Facility: HOSPITAL | Age: 59
End: 2019-03-18
Attending: INTERNAL MEDICINE
Payer: COMMERCIAL

## 2019-03-18 DIAGNOSIS — M01.X0 ACUTE BACTERIAL ARTHRITIS: Primary | ICD-10-CM

## 2019-03-18 DIAGNOSIS — A49.9 ACUTE BACTERIAL ARTHRITIS: Primary | ICD-10-CM

## 2019-03-18 LAB
ALBUMIN SERPL BCP-MCNC: 2 G/DL
ALP SERPL-CCNC: 87 U/L
ALT SERPL W/O P-5'-P-CCNC: 14 U/L
ANION GAP SERPL CALC-SCNC: 11 MMOL/L
AST SERPL-CCNC: 16 U/L
BASOPHILS # BLD AUTO: 0.05 K/UL
BASOPHILS NFR BLD: 0.9 %
BILIRUB SERPL-MCNC: 0.3 MG/DL
BUN SERPL-MCNC: 93 MG/DL
CALCIUM SERPL-MCNC: 8.3 MG/DL
CHLORIDE SERPL-SCNC: 95 MMOL/L
CO2 SERPL-SCNC: 29 MMOL/L
CREAT SERPL-MCNC: 1.6 MG/DL
CRP SERPL-MCNC: 43.8 MG/L
DIFFERENTIAL METHOD: ABNORMAL
EOSINOPHIL # BLD AUTO: 0.3 K/UL
EOSINOPHIL NFR BLD: 5.5 %
ERYTHROCYTE [DISTWIDTH] IN BLOOD BY AUTOMATED COUNT: 20.6 %
ERYTHROCYTE [SEDIMENTATION RATE] IN BLOOD BY WESTERGREN METHOD: 56 MM/HR
EST. GFR  (AFRICAN AMERICAN): 53 ML/MIN/1.73 M^2
EST. GFR  (NON AFRICAN AMERICAN): 46 ML/MIN/1.73 M^2
FUNGUS SPEC CULT: NORMAL
FUNGUS SPEC CULT: NORMAL
GLUCOSE SERPL-MCNC: 137 MG/DL
HCT VFR BLD AUTO: 27.7 %
HGB BLD-MCNC: 8.6 G/DL
LYMPHOCYTES # BLD AUTO: 0.9 K/UL
LYMPHOCYTES NFR BLD: 15 %
MCH RBC QN AUTO: 26.3 PG
MCHC RBC AUTO-ENTMCNC: 31 G/DL
MCV RBC AUTO: 85 FL
MONOCYTES # BLD AUTO: 0.3 K/UL
MONOCYTES NFR BLD: 5.8 %
NEUTROPHILS # BLD AUTO: 4.3 K/UL
NEUTROPHILS NFR BLD: 72.8 %
PLATELET # BLD AUTO: 269 K/UL
PMV BLD AUTO: 9.4 FL
POTASSIUM SERPL-SCNC: 3.6 MMOL/L
PROT SERPL-MCNC: 5.5 G/DL
RBC # BLD AUTO: 3.27 M/UL
SODIUM SERPL-SCNC: 135 MMOL/L
WBC # BLD AUTO: 5.85 K/UL

## 2019-03-18 PROCEDURE — 85652 RBC SED RATE AUTOMATED: CPT

## 2019-03-18 PROCEDURE — 85025 COMPLETE CBC W/AUTO DIFF WBC: CPT

## 2019-03-18 PROCEDURE — 80053 COMPREHEN METABOLIC PANEL: CPT

## 2019-03-18 PROCEDURE — 86140 C-REACTIVE PROTEIN: CPT

## 2019-03-18 RX ORDER — EZETIMIBE 10 MG/1
TABLET ORAL
Qty: 30 TABLET | Refills: 0 | Status: SHIPPED | OUTPATIENT
Start: 2019-03-18 | End: 2019-04-29 | Stop reason: SDUPTHER

## 2019-03-19 ENCOUNTER — OFFICE VISIT (OUTPATIENT)
Dept: CARDIOLOGY | Facility: CLINIC | Age: 59
End: 2019-03-19
Payer: COMMERCIAL

## 2019-03-19 ENCOUNTER — TELEPHONE (OUTPATIENT)
Dept: INFECTIOUS DISEASES | Facility: CLINIC | Age: 59
End: 2019-03-19

## 2019-03-19 VITALS
HEART RATE: 86 BPM | OXYGEN SATURATION: 90 % | DIASTOLIC BLOOD PRESSURE: 62 MMHG | WEIGHT: 225.75 LBS | RESPIRATION RATE: 16 BRPM | BODY MASS INDEX: 32.39 KG/M2 | SYSTOLIC BLOOD PRESSURE: 106 MMHG

## 2019-03-19 DIAGNOSIS — I25.10 CORONARY ARTERY DISEASE INVOLVING NATIVE CORONARY ARTERY OF NATIVE HEART WITHOUT ANGINA PECTORIS: ICD-10-CM

## 2019-03-19 DIAGNOSIS — E78.2 MIXED HYPERLIPIDEMIA: ICD-10-CM

## 2019-03-19 DIAGNOSIS — E11.621 TYPE 2 DIABETES MELLITUS WITH LEFT DIABETIC FOOT ULCER: ICD-10-CM

## 2019-03-19 DIAGNOSIS — I10 ESSENTIAL HYPERTENSION: ICD-10-CM

## 2019-03-19 DIAGNOSIS — I73.9 PVD (PERIPHERAL VASCULAR DISEASE): ICD-10-CM

## 2019-03-19 DIAGNOSIS — I46.9 CARDIAC ARREST: ICD-10-CM

## 2019-03-19 DIAGNOSIS — L08.9 FOOT INFECTION: ICD-10-CM

## 2019-03-19 DIAGNOSIS — I50.42 CHRONIC COMBINED SYSTOLIC AND DIASTOLIC HEART FAILURE: Primary | ICD-10-CM

## 2019-03-19 DIAGNOSIS — L97.529 TYPE 2 DIABETES MELLITUS WITH LEFT DIABETIC FOOT ULCER: ICD-10-CM

## 2019-03-19 PROCEDURE — 93289 PR INTERROG EVAL, IN PERSON,CARDVERT/DEFIB: ICD-10-PCS | Mod: 26,,, | Performed by: INTERNAL MEDICINE

## 2019-03-19 PROCEDURE — 3074F SYST BP LT 130 MM HG: CPT | Mod: CPTII,S$GLB,, | Performed by: INTERNAL MEDICINE

## 2019-03-19 PROCEDURE — 93289 INTERROG DEVICE EVAL HEART: CPT | Mod: 26,,, | Performed by: INTERNAL MEDICINE

## 2019-03-19 PROCEDURE — 99999 PR PBB SHADOW E&M-EST. PATIENT-LVL III: CPT | Mod: PBBFAC,,, | Performed by: INTERNAL MEDICINE

## 2019-03-19 PROCEDURE — 99214 OFFICE O/P EST MOD 30 MIN: CPT | Mod: S$GLB,,, | Performed by: INTERNAL MEDICINE

## 2019-03-19 PROCEDURE — 3078F PR MOST RECENT DIASTOLIC BLOOD PRESSURE < 80 MM HG: ICD-10-PCS | Mod: CPTII,S$GLB,, | Performed by: INTERNAL MEDICINE

## 2019-03-19 PROCEDURE — 3074F PR MOST RECENT SYSTOLIC BLOOD PRESSURE < 130 MM HG: ICD-10-PCS | Mod: CPTII,S$GLB,, | Performed by: INTERNAL MEDICINE

## 2019-03-19 PROCEDURE — 99214 PR OFFICE/OUTPT VISIT, EST, LEVL IV, 30-39 MIN: ICD-10-PCS | Mod: S$GLB,,, | Performed by: INTERNAL MEDICINE

## 2019-03-19 PROCEDURE — 3044F HG A1C LEVEL LT 7.0%: CPT | Mod: CPTII,S$GLB,, | Performed by: INTERNAL MEDICINE

## 2019-03-19 PROCEDURE — 99999 PR PBB SHADOW E&M-EST. PATIENT-LVL III: ICD-10-PCS | Mod: PBBFAC,,, | Performed by: INTERNAL MEDICINE

## 2019-03-19 PROCEDURE — 3008F PR BODY MASS INDEX (BMI) DOCUMENTED: ICD-10-PCS | Mod: CPTII,S$GLB,, | Performed by: INTERNAL MEDICINE

## 2019-03-19 PROCEDURE — 3078F DIAST BP <80 MM HG: CPT | Mod: CPTII,S$GLB,, | Performed by: INTERNAL MEDICINE

## 2019-03-19 PROCEDURE — 3044F PR MOST RECENT HEMOGLOBIN A1C LEVEL <7.0%: ICD-10-PCS | Mod: CPTII,S$GLB,, | Performed by: INTERNAL MEDICINE

## 2019-03-19 PROCEDURE — 3008F BODY MASS INDEX DOCD: CPT | Mod: CPTII,S$GLB,, | Performed by: INTERNAL MEDICINE

## 2019-03-19 NOTE — TELEPHONE ENCOUNTER
----- Message from Dixon West MD sent at 3/18/2019  5:54 PM CDT -----  His kidney function declined.  See if home health can repeat the the CMP this Thursday.  Thanks.

## 2019-03-19 NOTE — PROGRESS NOTES
Subjective:    Patient ID:  Marvin Ray is a 58 y.o. male who presents for follow-up of CAD F/U ([facility] )      HPI  Patient is here for follow-up of systolic heart failure and device check.  He had 1 nonsustained VT episode and it slightly increased threshold no significant pacing.  He had several of his toes on his left foot amputated.  He is still going to wound care clinic and has been healing adequately.  He denies any cardiopulmonary complaints and has been on several diuretics very high regimen.  He denies any current PND, orthopnea and has stable lower extremity edema and ascites for which he gets routine paracentesis.  He says last time they lee ann out 5 L and previous time the drug 10 L. He denies any dizziness, presyncope or syncope.    Review of Systems   Constitution: Negative.   HENT: Negative.    Eyes: Negative.    Cardiovascular: Negative for chest pain, dyspnea on exertion, irregular heartbeat, leg swelling, near-syncope, orthopnea, palpitations, paroxysmal nocturnal dyspnea and syncope.   Respiratory: Negative for shortness of breath.    Skin: Negative.    Musculoskeletal: Negative.    Gastrointestinal: Negative for abdominal pain, constipation and diarrhea.   Genitourinary: Negative for dysuria.   Neurological: Negative for dizziness.   Psychiatric/Behavioral: Negative.         Objective:    Physical Exam   Constitutional: He is oriented to person, place, and time. He appears well-developed and well-nourished. No distress.   HENT:   Head: Normocephalic and atraumatic.   Eyes: Conjunctivae and EOM are normal. Pupils are equal, round, and reactive to light.   Neck: Normal range of motion. Neck supple. No thyromegaly present.   Cardiovascular: Normal rate, regular rhythm and normal heart sounds.   No murmur heard.  Pulmonary/Chest: Effort normal and breath sounds normal. No respiratory distress. He has no wheezes. He has no rales. He exhibits no tenderness.   Abdominal: Soft. Bowel sounds are  normal. He exhibits distension.   Positive fluid wave   Musculoskeletal: He exhibits edema.   Neurological: He is alert and oriented to person, place, and time.   Skin: Skin is warm and dry.   Psychiatric: He has a normal mood and affect. His behavior is normal.       ECHO:  12-18  · Severely decreased left ventricular systolic function. The estimated ejection fraction is 20%  · Severe global hypokinetic wall motion. Cannot exclude RWMA.  · Septal wall has abnormal motion. Systolic and diastolic flattening of the interventricular septum consistent with right ventricle pressure and volume overload.  · Left ventricular diastolic dysfunction.  · Mild right ventricular enlargement.  · Mildly to moderately reduced right ventricular systolic function.  · Moderate tricuspid regurgitation.  · There is a large left pleural effusion.    NST:  · Abnormal myocardial perfusion study  · There is a moderate amount of infarct in the inferior wall(s).In the typical distribution of the RCA territory.  · Post Stress Ejection Fraction is 17 %     LHC:  B. Summary/Post-Operative Diagnosis       Three vessel coronary artery disease.    LV systolic and diastolic dysfunction.     E. Angiographic Results     Diagnostic:          Patient has a right dominant coronary artery.        - Left Main Coronary Artery:             The LM has luminal irregularities. There is BAKARI 3 flow.     - Left Anterior Descending Artery:             The proximal LAD has a 80% stenosis. There is BAKARI 3 flow.             The mid LAD has a 90% stenosis. There is BAKARI 3 flow. The remaining portion of the vessel is diffusely diseased.     - Left Circumflex Artery:             The mid LCX has a 60% stenosis. There is BAKARI 3 flow. The remaining portion of the vessel has luminal irregularities.     - Right Coronary Artery:             The proximal RCA has a 60% stenosis. There is BAKARI 3 flow.             The mid RCA has a 80% stenosis. There is BAKARI 3 flow. The remaining  portion of the vessel has luminal irregularities.     - Posterior Descending Artery:             The proximal PDA has a 60% stenosis. There is BAKARI 3 flow. The remaining portion of the vessel has luminal irregularities.     - Radial:             The radial.    Assessment:       1. Chronic combined systolic and diastolic heart failure    2. Coronary artery disease involving native coronary artery of native heart without angina pectoris    3. Cardiac arrest    4. Type 2 diabetes mellitus with left diabetic foot ulcer    5. Mixed hyperlipidemia    6. PVD (peripheral vascular disease)    7. Essential hypertension    8. Foot infection         Plan:       -continue medical therapy  -surveillance device checks with [facility]  -continue follow-up with podiatry/wound care  -follow-up with IR for paracentesis    Return to clinic in 6 months with device checks          [facility] Medical ICD check:    Indication:  Primary prevention    Underlying rhythm is sinus rhythm at 84 beats per minute  V paced less than 1%    RV:  2 volts at 1 millisecond, 3.1 mV, 380 Ohms    One nonsustained VT episode lasting approximately 30 beats  RV threshold increased to 2.2 volts at 1 millisecond output was increased to 5 volts at 1 millisecond    Return to clinic in 6 months

## 2019-03-19 NOTE — LETTER
March 19, 2019      Francisca Echeverria MD  [address]  [facility] [address]           [facility] - Cardiology  [address] [facility] [address]  [place] [address]  Phone: [phone]          Patient: Marvin Ray   MR Number: [id number]   YOB: 1960   Date of Visit: 3/19/2019       Dear Dr. Francisca Echeverria:    Thank you for referring Marvin Ray to me for evaluation. Attached you will find relevant portions of my assessment and plan of care.    If you have questions, please do not hesitate to call me. I look forward to following Marvin Ray along with you.    Sincerely,    Pernell Greer MD    Enclosure  CC:  No Recipients    If you would like to receive this communication electronically, please contact [email or web address] or [phone] to request more information on [employer] access.    For providers and/or their staff who would like to refer a patient to [facility], please contact us through our one-stop-shop provider referral line, [facility] , at [phone].    If you feel you have received this communication in error or would no longer like to receive these types of communications, please e-mail [email or web address]

## 2019-03-20 ENCOUNTER — OFFICE VISIT (OUTPATIENT)
Dept: FAMILY MEDICINE | Facility: CLINIC | Age: 59
End: 2019-03-20
Payer: COMMERCIAL

## 2019-03-20 VITALS
HEIGHT: 70 IN | BODY MASS INDEX: 33.55 KG/M2 | DIASTOLIC BLOOD PRESSURE: 80 MMHG | HEART RATE: 77 BPM | SYSTOLIC BLOOD PRESSURE: 120 MMHG | WEIGHT: 234.38 LBS | TEMPERATURE: 96 F | OXYGEN SATURATION: 98 % | RESPIRATION RATE: 17 BRPM

## 2019-03-20 DIAGNOSIS — E43 SEVERE MALNUTRITION: ICD-10-CM

## 2019-03-20 DIAGNOSIS — R18.8 OTHER ASCITES: Primary | ICD-10-CM

## 2019-03-20 DIAGNOSIS — L97.522 NON-PRESSURE CHRONIC ULCER OF OTHER PART OF LEFT FOOT WITH FAT LAYER EXPOSED: ICD-10-CM

## 2019-03-20 DIAGNOSIS — E11.628 DIABETIC FOOT INFECTION: ICD-10-CM

## 2019-03-20 DIAGNOSIS — Z78.9 TAKES DIETARY SUPPLEMENTS: ICD-10-CM

## 2019-03-20 DIAGNOSIS — I50.43 ACUTE ON CHRONIC COMBINED SYSTOLIC AND DIASTOLIC CONGESTIVE HEART FAILURE: ICD-10-CM

## 2019-03-20 DIAGNOSIS — L97.529 TYPE 2 DIABETES MELLITUS WITH LEFT DIABETIC FOOT ULCER: ICD-10-CM

## 2019-03-20 DIAGNOSIS — E11.621 TYPE 2 DIABETES MELLITUS WITH LEFT DIABETIC FOOT ULCER: ICD-10-CM

## 2019-03-20 DIAGNOSIS — L08.9 DIABETIC FOOT INFECTION: ICD-10-CM

## 2019-03-20 DIAGNOSIS — I25.10 CORONARY ARTERY DISEASE INVOLVING NATIVE CORONARY ARTERY OF NATIVE HEART WITHOUT ANGINA PECTORIS: Chronic | ICD-10-CM

## 2019-03-20 PROCEDURE — 3044F PR MOST RECENT HEMOGLOBIN A1C LEVEL <7.0%: ICD-10-PCS | Mod: CPTII,S$GLB,, | Performed by: FAMILY MEDICINE

## 2019-03-20 PROCEDURE — 99215 OFFICE O/P EST HI 40 MIN: CPT | Mod: S$GLB,,, | Performed by: FAMILY MEDICINE

## 2019-03-20 PROCEDURE — 3008F PR BODY MASS INDEX (BMI) DOCUMENTED: ICD-10-PCS | Mod: CPTII,S$GLB,, | Performed by: FAMILY MEDICINE

## 2019-03-20 PROCEDURE — 3079F PR MOST RECENT DIASTOLIC BLOOD PRESSURE 80-89 MM HG: ICD-10-PCS | Mod: CPTII,S$GLB,, | Performed by: FAMILY MEDICINE

## 2019-03-20 PROCEDURE — 3008F BODY MASS INDEX DOCD: CPT | Mod: CPTII,S$GLB,, | Performed by: FAMILY MEDICINE

## 2019-03-20 PROCEDURE — 99999 PR PBB SHADOW E&M-EST. PATIENT-LVL III: ICD-10-PCS | Mod: PBBFAC,,, | Performed by: FAMILY MEDICINE

## 2019-03-20 PROCEDURE — 3074F SYST BP LT 130 MM HG: CPT | Mod: CPTII,S$GLB,, | Performed by: FAMILY MEDICINE

## 2019-03-20 PROCEDURE — 3044F HG A1C LEVEL LT 7.0%: CPT | Mod: CPTII,S$GLB,, | Performed by: FAMILY MEDICINE

## 2019-03-20 PROCEDURE — 3074F PR MOST RECENT SYSTOLIC BLOOD PRESSURE < 130 MM HG: ICD-10-PCS | Mod: CPTII,S$GLB,, | Performed by: FAMILY MEDICINE

## 2019-03-20 PROCEDURE — 99999 PR PBB SHADOW E&M-EST. PATIENT-LVL III: CPT | Mod: PBBFAC,,, | Performed by: FAMILY MEDICINE

## 2019-03-20 PROCEDURE — 3079F DIAST BP 80-89 MM HG: CPT | Mod: CPTII,S$GLB,, | Performed by: FAMILY MEDICINE

## 2019-03-20 PROCEDURE — 99215 PR OFFICE/OUTPT VISIT, EST, LEVL V, 40-54 MIN: ICD-10-PCS | Mod: S$GLB,,, | Performed by: FAMILY MEDICINE

## 2019-03-20 RX ORDER — COLLAGENASE SANTYL 250 [ARB'U]/G
OINTMENT TOPICAL
Refills: 0 | COMMUNITY
Start: 2019-02-25 | End: 2019-06-10

## 2019-03-20 RX ORDER — CARVEDILOL 6.25 MG/1
TABLET ORAL
Refills: 11 | COMMUNITY
Start: 2019-02-20 | End: 2019-06-10

## 2019-03-20 NOTE — PROGRESS NOTES
Transitional Care Note  Subjective:       Patient ID: Marvin Ray is a 58 y.o. male.  Chief Complaint: Follow-up    Family and/or Caretaker present at visit?  Yes.  Diagnostic tests reviewed/disposition: No diagnosic tests pending after this hospitalization.  Disease/illness education: CHF and type 2 DM  Home health/community services discussion/referrals: Patient has home health established at [facility].   Establishment or re-establishment of referral orders for community resources: No other necessary community resources.   Discussion with other health care providers: No discussion with other health care providers necessary.   Patient admitted to [facility] extended care after excess ascites.  He has been changed to a different diuretic and continue to be on IV abx via PICC for infected left foot wound.  Patient states he does feel better, but continues to have excess fluid in his abdomen.  No shortness of breath, increased weakness, chest pain, or confusion.  His sister is with him today and states he is doing better with the new diuretic.  He is having labs drawn from PICC line every Monday by home health.  Patient and sister did voice concerns about the extended care [facility] he was at stating they almost gave him the wrong medication and rarely ever got his diet correct as he was suppose to be on a low sodium/diabetic diet.  He is scheduled to see Vascular next week for chronic venous wound and PAD.  He has follow up appointments with cardiology already scheduled      Review of Systems   Constitutional: Negative for appetite change, chills and diaphoresis.   HENT: Negative for congestion, ear pain, postnasal drip and sinus pain.    Respiratory: Negative.    Cardiovascular: Positive for leg swelling. Negative for chest pain and palpitations.   Gastrointestinal: Positive for abdominal distention. Negative for abdominal pain, blood in stool, constipation, diarrhea and nausea.   Genitourinary: Negative for dysuria,  enuresis and flank pain.   Musculoskeletal: Negative for back pain, gait problem and joint swelling.   Skin: Negative for color change, pallor and rash.   Neurological: Negative.    Psychiatric/Behavioral: Negative.        Objective:      Physical Exam   Constitutional: He is oriented to person, place, and time. He appears well-developed and well-nourished.   HENT:   Head: Normocephalic and atraumatic.   Right Ear: External ear normal.   Left Ear: External ear normal.   Nose: Nose normal.   Mouth/Throat: Oropharynx is clear and moist.   Eyes: Conjunctivae and EOM are normal. Pupils are equal, round, and reactive to light.   Neck: Normal range of motion. Neck supple.   Cardiovascular: Normal rate, regular rhythm and normal heart sounds.   Pulmonary/Chest: Effort normal and breath sounds normal.   Abdominal: He exhibits distension. He exhibits no mass. There is no tenderness. There is no rebound and no guarding.   Musculoskeletal: Normal range of motion.   Neurological: He is alert and oriented to person, place, and time.   Skin: Skin is warm and dry.   Psychiatric: He has a normal mood and affect.       Assessment:       1. Other ascites    2. Diabetic foot infection    3. Type 2 diabetes mellitus with left diabetic foot ulcer    4. Takes dietary supplements    5. Severe malnutrition    6. Coronary artery disease involving native coronary artery of native heart without angina pectoris    7. Acute on chronic combined systolic and diastolic congestive heart failure    8. Non-pressure chronic ulcer of other part of left foot with fat layer exposed        Plan:       1.  Will order routine paracentesis for asictes  2.  Follow up with cardiology as scheduled  3.  Follow up with vascular surgery as scheduled  4.  Call with any concerns  5.  Home health to continue routine wound care and blood work      Rubén Davis MD

## 2019-03-22 ENCOUNTER — TELEPHONE (OUTPATIENT)
Dept: FAMILY MEDICINE | Facility: CLINIC | Age: 59
End: 2019-03-22

## 2019-03-22 NOTE — TELEPHONE ENCOUNTER
Patient informed the order has been placed for the Paracentesis every 6 weeks. Patient will contact his wife then schedule. Patient given number to schedule appointment. Patient encouraged to contact the clinic if he has any further questions or concerns.

## 2019-03-22 NOTE — TELEPHONE ENCOUNTER
----- Message from Luna Damon sent at 3/22/2019  9:30 AM CDT -----  Contact: wife  [phone]   Chloé   Pt is checking on the status on a order for Radiology . Pls call pt or wife Chloé [phone]. Thanks.......Yaquelin

## 2019-03-25 ENCOUNTER — HOSPITAL ENCOUNTER (OUTPATIENT)
Facility: HOSPITAL | Age: 59
Discharge: HOME | End: 2019-03-25
Attending: RADIOLOGY | Admitting: RADIOLOGY
Payer: COMMERCIAL

## 2019-03-25 VITALS
OXYGEN SATURATION: 96 % | SYSTOLIC BLOOD PRESSURE: 101 MMHG | DIASTOLIC BLOOD PRESSURE: 56 MMHG | RESPIRATION RATE: 18 BRPM | TEMPERATURE: 98 F | HEART RATE: 82 BPM

## 2019-03-25 DIAGNOSIS — R18.8 OTHER ASCITES: ICD-10-CM

## 2019-03-25 PROCEDURE — 63600175 PHARM REV CODE 636 W HCPCS: Mod: JG | Performed by: RADIOLOGY

## 2019-03-25 PROCEDURE — P9047 ALBUMIN (HUMAN), 25%, 50ML: HCPCS | Mod: JG | Performed by: RADIOLOGY

## 2019-03-25 RX ORDER — ALBUMIN HUMAN 250 G/1000ML
50 SOLUTION INTRAVENOUS ONCE AS NEEDED
Status: COMPLETED | OUTPATIENT
Start: 2019-03-25 | End: 2019-03-25

## 2019-03-25 RX ORDER — HEPARIN 100 UNIT/ML
100 SYRINGE INTRAVENOUS ONCE AS NEEDED
Status: COMPLETED | OUTPATIENT
Start: 2019-03-25 | End: 2019-03-25

## 2019-03-25 RX ADMIN — ALBUMIN HUMAN 50 G: 0.25 SOLUTION INTRAVENOUS at 12:03

## 2019-03-25 RX ADMIN — Medication 100 UNITS: at 02:03

## 2019-03-25 NOTE — DISCHARGE SUMMARY
Radiology Discharge Summary      Hospital Course: No complications    Admit Date: 3/25/2019  Discharge Date: 03/25/2019     Instructions Given to Patient: Yes  Diet: Resume prior diet  Activity: activity as tolerated    Description of Condition on Discharge: Stable  Vital Signs (Most Recent): BP: 92/62 (03/25/19 1218)    Discharge Disposition: Home    Discharge Diagnosis:   CKD III and combined systolic/diastolic CHF complicated by recurrent abdominal distension and pain 2/2 recurrent painful, tense ascites s/p successful U/S-guided large-volume therapeutic paracentesis with local anesthetic only and albumin infusion post per protocol.     Patient tolerated the procedure well. No immediate post-procedural complications noted.          Zach Cha MD  Interventional Radiology

## 2019-03-25 NOTE — H&P
Radiology History & Physical      SUBJECTIVE:     Chief Complaint: Recurrent bdominal distension and pain    History of Present Illness:  Marvin Ray is a 58 y.o. male with PMHx of CKD III and combined systolic/diastolic CHF complicated by recurrent abdominal distension and pain 2/2 recurrent painful, tense ascites requiring frequent large-volume therapeutic paracentesis.     Pt returns as outpatient with recurrence of abdominal distension and pain with +fluid wave on PE indicating recurrent ascites with outpatient request for image-guided therapeutic paracentesis.       Past Medical History:   Diagnosis Date    Arthritis     Cellulitis     CKD (chronic kidney disease), stage III     Coronary artery disease     Diabetes mellitus     Diabetic retinopathy     Diabetic ulcer of left foot     Glaucoma     Gout     Hyperlipemia     Hypertension     ICD (implantable cardioverter-defibrillator) in place 11/02/2018    Left chest    Non-pressure chronic ulcer of other part of left foot with fat layer exposed 10/23/2018    PVD (peripheral vascular disease)     Type 2 diabetes mellitus with left diabetic foot ulcer 10/29/2018    Unsteady gait     uses a wheelchair     Past Surgical History:   Procedure Laterality Date    AHMED GLAUCOMA IMPLANT Left 2011    DONE AT [facility]    AMPUTATION, TOE Left 12/5/2018    Performed by [facility] at [facility] OR    AMPUTATION, TOES  2-5 Left 11/16/2018    [facility]formed by [facility] at [facility] OR    BAERVELDT GLAUCOMA IMPLANT Left 2012    WITH CATARACT EXTRACTION//DONE AT [facility]    CARDIAC CATHETERIZATION Left 05/2016    CARDIAC DEFIBRILLATOR PLACEMENT Left 11/02/2018    CATARACT EXTRACTION W/  INTRAOCULAR LENS IMPLANT Left 2012    WITH BAERVEDT//DONE AT [facility]    CATARACT EXTRACTION W/  INTRAOCULAR LENS IMPLANT Right 09/26/2018    COMPLEX ()    CB DESTRUCTION WITH CYCLO G6 Left 02/15/2017        CYST REMOVAL       DEBRIDEMENT, FOOT  12/28/2018    Performed [facility] at [facility] OR    Exam under anesthesia, left foot debridement, left foot washout, possible left second through fifth metatarsal resection Left 12/28/2018    Performed by Mitch [facility] at [facility] OR    Exam under anesthesia, left foot debridement, washout and all other indicated procedures Left 12/5/2018    Performed by Mitch Chan [facility] at [facility] OR    EXCISION, LESION, METATARSAL BONE  12/28/2018    Performed by Mitch [facility] at [facility] OR    HEART CATH-LEFT N/A 5/6/2016    Performed by [facility]ana [facility] at [facility] CATH LAB    INCISION AND DRAINAGE Left 11/16/2018    Performed by Mitch Chan [facility] at [facility] OR    Incision and Drainage, left lower extremity debridement, washout Left 11/14/2018    Performed by Mitch Chan MD at [facility] OR    INSERTION, ICD GENERATOR, SINGLE CHAMBER N/A 11/2/2018    Performed by Pernell Greer MD at [facility] CATH LAB    INSERTION, IOL PROSTHESIS Right 9/26/2018    Performed by Perla Cortés MD at [facility] OR 1ST FLR    LEFT FOOT WOUND DEBRIDEMENT, WASHOUT AND ALL OTHER INDICATED PROCEDURES Left 2/13/2019    Performed by Mitch Chan MD at [facility] OR    PARACENTESIS, ABDOMINAL, INITIAL N/A 3/1/2019    Performed by [facility] Provider at [facility] OR    PARACENTESIS, ABDOMINAL, REPEAT N/A 2/11/2019    Performed by [facility] Diagnostic Provider at [facility] OR    PHACOEMULSIFICATION, CATARACT Right 9/26/2018    Performed by Perla Cortés MD at [facility] OR 1ST FLR    Right foot surgery  10/2014    TOE AMPUTATION Right     first and second    TONSILLECTOMY      TRABECULECTOMY/G 6 LASER Left 2/15/2017    Performed by Perla Cortés MD at [facility] OR 1ST FLR       Home Meds:   Prior to Admission medications    Medication Sig Start Date End Date Taking? Authorizing Provider   allopurinol (ZYLOPRIM) 300 MG tablet Take 1 tablet (300 mg total) by mouth once daily. 1/23/19   Sara ANDRADE  MD Humza   aspirin 325 MG tablet Take 325 mg by mouth once daily.     Historical Provider, MD   bisacodyl (DULCOLAX) 5 mg EC tablet Take 5 mg by mouth daily as needed for Constipation.    Historical Provider, MD   brimonidine 0.2% (ALPHAGAN) 0.2 % Drop Place 1 drop into both eyes 2 (two) times daily. 2/8/19   Perla Cortés MD   carvedilol (COREG) 3.125 MG tablet Take 1 tablet (3.125 mg total) by mouth 2 (two) times daily. 1/23/19 1/23/20  Sara Ching MD   carvedilol (COREG) 6.25 MG tablet  2/20/19   Historical Provider, MD   coenzyme Q10 100 mg capsule Take 100 mg by mouth every morning.    Historical Provider, MD   dextrose 5 % SolP 100 mL with ceftolozane-tazobactam 1.5 gram SolR 3,000 mg injection Inject 3,000 mg into the vein every 8 (eight) hours. for 21 days 3/12/19 4/2/19  Francisca Echeverria MD   dorzolamide-timolol 2-0.5% (COSOPT) 22.3-6.8 mg/mL ophthalmic solution Place 1 drop into both eyes 2 (two) times daily. 2/8/19 2/8/20  Perla Cortés MD   ezetimibe (ZETIA) 10 mg tablet TAKE 1 TABLET(10 MG) BY MOUTH EVERY DAY 3/18/19   Altaf Patino DO   fish oil-omega-3 fatty acids 300-1,000 mg capsule Take 1 capsule by mouth 2 (two) times daily. 1/23/19   Sara Ching MD   insulin aspart U-100 (NOVOLOG) 100 unit/mL InPn pen Use on premeal readings: 150-200=+2, 201-250=+4; 251-300=+6; 301-350=+8, over 350=+10 units 1/31/19   Sheryl Madison NP   insulin degludec-liraglutide (XULTOPHY 100/3.6) 100 unit-3.6 mg /mL (3 mL) InPn Inject 42 Units into the skin once daily. 2/6/19   Sheryl Madison NP   insulin glargine-lixisenatide (SOLIQUA 100/33) 100 unit-33 mcg/mL InPn Inject 34 Units into the skin once daily.    Historical Provider, MD   losartan (COZAAR) 25 MG tablet Take 1 tablet (25 mg total) by mouth once daily. 3/12/19 3/11/20  Francisca Echeverria MD   metOLazone (ZAROXOLYN) 5 MG tablet Take 1 tablet (5 mg total) by mouth every Monday and Thursday. 30 minutes before lasix. 1/24/19   Sara Ching MD  "  miconazole NITRATE 2 % (MICOTIN) 2 % top powder Apply topically 2 (two) times daily. 12/19/18   Kirsten Trinh MD   MULTIVIT,THER IRON,CA,FA & MIN (MULTIVITAMIN) Tab Take 1 tablet by mouth every morning.     Historical Provider, MD   oxyCODONE-acetaminophen (PERCOCET) 5-325 mg per tablet Take 1 tablet by mouth every 6 (six) hours as needed for Pain. 1/24/19   Keyana Haider NP   pen needle, diabetic 31 gauge x 1/4" Ndle Use as directed 8/11/16   Mike Bass MD   SANTYL ointment FEDERICO TOPICALLY ONCE D 2/25/19   Historical Provider, MD   torsemide (DEMADEX) 20 MG Tab Take 4 tablets (80 mg total) by mouth 2 (two) times daily. 3/12/19   Francisca Echeverria MD     Anticoagulants/Antiplatelets: no anticoagulation    Allergies:   Review of patient's allergies indicates:   Allergen Reactions    Statins-hmg-coa reductase inhibitors      Generalized Pain    Onglyza [saxagliptin]     Penicillins Rash     Sedation History:  no adverse reactions    Review of Systems:   Hematological: no known coagulopathies  Respiratory: positive for - shortness of breath  Cardiovascular: positive for - dyspnea on exertion, edema and shortness of breath  Gastrointestinal: positive for - abdominal pain without tenderness  Genito-Urinary: no dysuria, trouble voiding, or hematuria  Musculoskeletal: negative  Neurological: no TIA or stroke symptoms         OBJECTIVE:     Vital Signs (Most Recent)  BP: 110/67 (03/25/19 1152)    Physical Exam:  ASA: III  Mallampati: III    General: no acute distress  Mental Status: alert and oriented to person, place and time  HEENT: normocephalic, atraumatic  Chest: mild labored breathing  Heart: regular heart rate  Abdomen: nontender. +distended  Extremity: moves all extremities    Laboratory  Lab Results   Component Value Date    INR 1.0 03/10/2019       Lab Results   Component Value Date    WBC 5.85 03/18/2019    HGB 8.6 (L) 03/18/2019    HCT 27.7 (L) 03/18/2019    MCV 85 03/18/2019     " 03/18/2019      Lab Results   Component Value Date     (H) 03/18/2019     (L) 03/18/2019    K 3.6 03/18/2019    CL 95 03/18/2019    CO2 29 03/18/2019    BUN 93 (H) 03/18/2019    CREATININE 1.6 (H) 03/18/2019    CALCIUM 8.3 (L) 03/18/2019    MG 1.8 03/12/2019    ALT 14 03/18/2019    AST 16 03/18/2019    ALBUMIN 2.0 (L) 03/18/2019    BILITOT 0.3 03/18/2019       ASSESSMENT/PLAN:     59 y/o M with CKD III and combined systolic/diastolic CHF complicated by recurrent abdominal distension and pain 2/2 recurrent painful, tense ascites requiring frequent large-volume therapeutic paracentesis.     1. Will attempt U/S-guided therapeutic paracentesis with local anesthetic only and albumin infusion post per protocol.     Risks (including, but not limited to, pain, bleeding, infection, damage to nearby structures, failure to obtain sufficient material for a diagnosis, the need for additional procedures, and death), benefits, and alternatives were discussed with the patient. All questions were answered to the best of my abilities. The patient wishes to proceed with the procedure. Written informed consent was obtained.    Thank you for considering IR for the care of your patient.     Zach Cha MD  Interventional Radiology

## 2019-03-25 NOTE — PROCEDURES
Radiology Post-Procedure Note    Pre Op Diagnosis: CKD III and combined systolic/diastolic CHF complicated by recurrent abdominal distension and pain 2/2 recurrent painful, tense ascites  Post Op Diagnosis: Same    Procedure: Therapeutic large-volume paracentesis    Procedure performed by: Zach Cha MD    Written Informed Consent Obtained: Yes  Specimen Removed: YES, 12-L of serous ascitic fluid  Estimated Blood Loss: none    Findings:   1. Successful therapeutic large-volume paracentesis with local anesthetic only. Patient tolerated the procedure well. No immediate post-procedural complications noted. Pt to receive albumin infusion post per protocol.     Zach Cha MD  Interventional Radiology

## 2019-03-26 ENCOUNTER — TELEPHONE (OUTPATIENT)
Dept: INFECTIOUS DISEASES | Facility: CLINIC | Age: 59
End: 2019-03-26

## 2019-03-26 NOTE — TELEPHONE ENCOUNTER
----- Message from Dixon West MD sent at 3/26/2019  9:10 AM CDT -----  His kidney function has declined.  Ask him to hold the antibiotics for now and notify the infusion company.  Recheck CMP on Thursday.

## 2019-03-28 ENCOUNTER — OFFICE VISIT (OUTPATIENT)
Dept: VASCULAR SURGERY | Facility: CLINIC | Age: 59
End: 2019-03-28
Payer: COMMERCIAL

## 2019-03-28 ENCOUNTER — TELEPHONE (OUTPATIENT)
Dept: INFECTIOUS DISEASES | Facility: CLINIC | Age: 59
End: 2019-03-28

## 2019-03-28 VITALS
HEIGHT: 70 IN | SYSTOLIC BLOOD PRESSURE: 90 MMHG | WEIGHT: 234.38 LBS | HEART RATE: 59 BPM | BODY MASS INDEX: 33.55 KG/M2 | DIASTOLIC BLOOD PRESSURE: 42 MMHG

## 2019-03-28 DIAGNOSIS — L03.116 LEFT LEG CELLULITIS: Primary | ICD-10-CM

## 2019-03-28 DIAGNOSIS — S91.302D OPEN WOUND OF LEFT FOOT, SUBSEQUENT ENCOUNTER: Primary | ICD-10-CM

## 2019-03-28 DIAGNOSIS — S81.802A OPEN LEG WOUND, LEFT, INITIAL ENCOUNTER: ICD-10-CM

## 2019-03-28 PROCEDURE — 3074F PR MOST RECENT SYSTOLIC BLOOD PRESSURE < 130 MM HG: ICD-10-PCS | Mod: CPTII,S$GLB,, | Performed by: SURGERY

## 2019-03-28 PROCEDURE — 3074F SYST BP LT 130 MM HG: CPT | Mod: CPTII,S$GLB,, | Performed by: SURGERY

## 2019-03-28 PROCEDURE — 99214 PR OFFICE/OUTPT VISIT, EST, LEVL IV, 30-39 MIN: ICD-10-PCS | Mod: S$GLB,,, | Performed by: SURGERY

## 2019-03-28 PROCEDURE — 3078F DIAST BP <80 MM HG: CPT | Mod: CPTII,S$GLB,, | Performed by: SURGERY

## 2019-03-28 PROCEDURE — 99999 PR PBB SHADOW E&M-EST. PATIENT-LVL III: CPT | Mod: PBBFAC,,, | Performed by: SURGERY

## 2019-03-28 PROCEDURE — 3008F PR BODY MASS INDEX (BMI) DOCUMENTED: ICD-10-PCS | Mod: CPTII,S$GLB,, | Performed by: SURGERY

## 2019-03-28 PROCEDURE — 3008F BODY MASS INDEX DOCD: CPT | Mod: CPTII,S$GLB,, | Performed by: SURGERY

## 2019-03-28 PROCEDURE — 99214 OFFICE O/P EST MOD 30 MIN: CPT | Mod: S$GLB,,, | Performed by: SURGERY

## 2019-03-28 PROCEDURE — 99999 PR PBB SHADOW E&M-EST. PATIENT-LVL III: ICD-10-PCS | Mod: PBBFAC,,, | Performed by: SURGERY

## 2019-03-28 PROCEDURE — 3078F PR MOST RECENT DIASTOLIC BLOOD PRESSURE < 80 MM HG: ICD-10-PCS | Mod: CPTII,S$GLB,, | Performed by: SURGERY

## 2019-03-28 RX ORDER — TORSEMIDE 20 MG/1
80 TABLET ORAL 2 TIMES DAILY
Qty: 120 TABLET | Refills: 0 | Status: SHIPPED | OUTPATIENT
Start: 2019-03-28 | End: 2019-04-11 | Stop reason: SDUPTHER

## 2019-03-28 NOTE — TELEPHONE ENCOUNTER
Kayla from [facility] called to state patient was suppose to have labs done today with HH but HH has droped patient. Can you please order stat BMP to be done at ?

## 2019-03-28 NOTE — TELEPHONE ENCOUNTER
Attempted to call patient to schedule labs but didn't answer. Left voicemail to return call to schedule lab asap.

## 2019-03-28 NOTE — TELEPHONE ENCOUNTER
----- Message from Kamini Garza sent at 3/28/2019  4:38 PM CDT -----  Contact: Sister/pt. Marvin  tel:    Chloé    [phone]   Patient Returning Call from [facility]    Who Left Message for Patient:  Jessica     Communication Preference:  Phone   Additional Information:  Pls call.

## 2019-03-28 NOTE — LETTER
March 28, 2019        Rubén Davis MD  [address] [facility] [address]             [facility] Vascular Surgery  [address] [facility] [address]  [facility] [address]  Phone: [phone]  Fax: [phone]   Patient: Marvin Ray   MR Number: [id number]   YOB: 1960   Date of Visit: 3/28/2019       Dear Dr. Davis:    Thank you for referring Marvin Ray to me for evaluation. Below are the relevant portions of my assessment and plan of care.            If you have questions, please do not hesitate to call me. I look forward to following Marvin along with you.    Sincerely,      Mitch Chan MD           CC  No Recipients

## 2019-03-28 NOTE — PROGRESS NOTES
iMtch Chan MD [facility]                         03/28/2019    HPI:  Marvin Ray is a 59 y.o. male with   Patient Active Problem List   Diagnosis    Epiretinal membrane, both eyes    Nuclear sclerotic cataract of right eye    Pseudophakia, left eye    Ptosis, left eyelid    DM type 2 with diabetic peripheral neuropathy    HLD (hyperlipidemia)    Neovascular glaucoma of both eyes    Tophaceous gout    Essential hypertension    CAD (coronary artery disease)    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 edema, with long-term current use of insulin    Neovascular glaucoma of left eye, severe stage    Statin myopathy    Neovascular glaucoma, right eye, mild stage    Left leg cellulitis    PVD (peripheral vascular disease)    Right wrist pain    Multiple open wounds of lower leg    Hepatosplenomegaly    Non-pressure chronic ulcer of other part of left foot with fat layer exposed    Type 2 diabetes mellitus with left diabetic foot ulcer    Open wound of left foot    Cellulitis of left lower extremity    Diabetic foot infection    Other ascites    Severe malnutrition    Goals of care, counseling/discussion    Alteration in skin integrity    MDR Acinetobacter baumannii infection    Takes dietary supplements    Intertriginous dermatitis associated with moisture    Debility    Infection due to multidrug-resistant Pseudomonas aeruginosa    Subacute osteomyelitis of left foot    Ascites    Acute on chronic combined systolic and diastolic congestive heart failure    Stage 3 chronic kidney disease    being managed by PCP and specialists who is here today for evaluation of L foot wound.  Seen in hospital last mo with LLE cellulitis.  Treated with Abx.  Also had paracentesis for ascites with negative w/u per family.  Patient states location is L foot occurring for 1-2 mo,  worsening foot wound although improvement in edema and erythema.  Associated signs and symptoms include pain and edema.  Quality is aching and severity is 5/10.  Symptoms began 10/2018 spontaneously with blistering and severe edema.  Alleviating factors include wound care and elevation.  Worsening factors include pressure.    Tobacco use: denies    1/4/19: s/p LLE angioplasty and multiple subsequent OR and bedside debridements.  On IV Abx per culture results.  Glucose better controlled.  No fevers.  Receiving local wound care with Santyl.    1/14/19:  Cont to receive local wound care.  Pseudomonas in tissue and bone cultures multidrug resistant other than aminoglycoside; d/w Dr. Velez with high risk of ESRD with 6 weeks of aminoglycoside treatment.  No F/C.    1/31/19:  Pt without complaints.  Was started back on Bactrim.  Family doing wound care.    2/28/19: S/p L foot debridement 2/13/19.  Started on Meropenem.    Interval history:  No complaints.  S/p paracentesis and pt feels better.    Past Medical History:   Diagnosis Date    Arthritis     Cellulitis     CKD (chronic kidney disease), stage III     Coronary artery disease     Diabetes mellitus     Diabetic retinopathy     Diabetic ulcer of left foot     Glaucoma     Gout     Hyperlipemia     Hypertension     ICD (implantable cardioverter-defibrillator) in place 11/02/2018    Left chest    Non-pressure chronic ulcer of other part of left foot with fat layer exposed 10/23/2018    PVD (peripheral vascular disease)     Type 2 diabetes mellitus with left diabetic foot ulcer 10/29/2018    Unsteady gait     uses a wheelchair     Past Surgical History:   Procedure Laterality Date    AHMED GLAUCOMA IMPLANT Left 2011    DONE AT [facility]    AMPUTATION, TOE Left 12/5/2018    [facility]    AMPUTATION, TOES  2-5 Left 11/16/2018    [facility] at [facility]    BAERVELDT GLAUCOMA IMPLANT Left 2012     WITH CATARACT EXTRACTION//DONE AT [facility]    CARDIAC CATHETERIZATION Left 05/2016    CARDIAC DEFIBRILLATOR PLACEMENT Left 11/02/2018    CATARACT EXTRACTION W/  INTRAOCULAR LENS IMPLANT Left 2012    WITH BAERVEDT//DONE AT [facility]    CATARACT EXTRACTION W/  INTRAOCULAR LENS IMPLANT Right 09/26/2018    COMPLEX ()    CB DESTRUCTION WITH CYCLO G6 Left 02/15/2017        CYST REMOVAL      DEBRIDEMENT, FOOT  12/28/2018    Performed by [facility] at [facility] OR    Exam under anesthesia, left foot debridement, left foot washout, possible left second through fifth metatarsal resection Left 12/28/2018    Performed by Mitch Chan MD at [facility] OR    Exam under anesthesia, left foot debridement, washout and all other indicated procedures Left 12/5/2018    Performed by Mitch Chan [facility] at [facility] OR    EXCISION, LESION, METATARSAL BONE  12/28/2018    Performed by Mitch Chan MD at [facility] OR    HEART CATH-LEFT N/A 5/6/2016    Performed by [facility]ana [facility] at [facility] CATH LAB    INCISION AND DRAINAGE Left 11/16/2018    Performed by Mitch Chan [facility] at [facility] OR    Incision and Drainage, left lower extremity debridement, washout Left 11/14/2018    Performed by Mitch Chan [facility] at [facility] OR    INSERTION, ICD GENERATOR, SINGLE CHAMBER N/A 11/2/2018    Performed by Pernell Greer MD at [facility] CATH LAB    INSERTION, IOL PROSTHESIS Right 9/26/2018    Performed by Perla Cortés MD at [facility] OR 1ST FLR    LEFT FOOT WOUND DEBRIDEMENT, WASHOUT AND ALL OTHER INDICATED PROCEDURES Left 2/13/2019    Performed by Mitch Chan MD at [facility] OR    PARACENTESIS, ABDOMINAL, INITIAL N/A 3/25/2019    [facility] by [facility] at [facility] OR    PARACENTESIS, ABDOMINAL, INITIAL N/A 3/1/2019    Performed by [facility] at [facility] OR    PARACENTESIS, ABDOMINAL, REPEAT N/A 2/11/2019    Performed by [facility] OR     PHACOEMULSIFICATION, CATARACT Right 2018    Performed by [facility] at [facility] OR 1ST FLR    Right foot surgery  10/2014    TOE AMPUTATION Right     first and second    TONSILLECTOMY      TRABECULECTOMY/G 6 LASER Left 2/15/2017    Performed by [facility] at [facility] OR 1ST FLR     Family History   Problem Relation Age of Onset    Diabetes Mother     Heart disease Brother     No Known Problems Father     No Known Problems Sister     No Known Problems Maternal Aunt     No Known Problems Maternal Uncle     No Known Problems Paternal Aunt     No Known Problems Paternal Uncle     No Known Problems Maternal Grandmother     No Known Problems Maternal Grandfather     No Known Problems Paternal Grandmother     No Known Problems Paternal Grandfather     Anemia Neg Hx     Arrhythmia Neg Hx     Asthma Neg Hx     Clotting disorder Neg Hx     Fainting Neg Hx     Heart attack Neg Hx     Heart failure Neg Hx     Hyperlipidemia Neg Hx     Hypertension Neg Hx     Stroke Neg Hx     Atrial Septal Defect Neg Hx     Amblyopia Neg Hx     Blindness Neg Hx     Glaucoma Neg Hx     Macular degeneration Neg Hx     Retinal detachment Neg Hx     Strabismus Neg Hx      Social History     Socioeconomic History    Marital status: Legally      Spouse name: Not on file    Number of children: Not on file    Years of education: 14    Highest education level: Not on file   Occupational History    Not on file   Social Needs    Financial resource strain: Not on file    Food insecurity:     Worry: Not on file     Inability: Not on file    Transportation needs:     Medical: Not on file     Non-medical: Not on file   Tobacco Use    Smoking status: Former Smoker     Packs/day: 1.00     Years: 3.00     Pack years: 3.00     Types: Cigarettes     Last attempt to quit: 1984     Years since quittin.7    Smokeless tobacco: Never Used   Substance and Sexual Activity    Alcohol  use: Yes     Alcohol/week: 0.6 oz     Types: 1 Cans of beer per week     Comment: Occasional    Drug use: No    Sexual activity: Not Currently   Lifestyle    Physical activity:     Days per week: Not on file     Minutes per session: Not on file    Stress: Not on file   Relationships    Social connections:     Talks on phone: Not on file     Gets together: Not on file     Attends Yarsani service: Not on file     Active member of club or organization: Not on file     Attends meetings of clubs or organizations: Not on file     Relationship status: Not on file    Intimate partner violence:     Fear of current or ex partner: Not on file     Emotionally abused: Not on file     Physically abused: Not on file     Forced sexual activity: Not on file   Other Topics Concern    Not on file   Social History Narrative    Not on file       Current Outpatient Medications:     allopurinol (ZYLOPRIM) 300 MG tablet, Take 1 tablet (300 mg total) by mouth once daily., Disp: 30 tablet, Rfl: 3    aspirin 325 MG tablet, Take 325 mg by mouth once daily. , Disp: , Rfl:     bisacodyl (DULCOLAX) 5 mg EC tablet, Take 5 mg by mouth daily as needed for Constipation., Disp: , Rfl:     brimonidine 0.2% (ALPHAGAN) 0.2 % Drop, Place 1 drop into both eyes 2 (two) times daily., Disp: 10 mL, Rfl: 11    carvedilol (COREG) 3.125 MG tablet, Take 1 tablet (3.125 mg total) by mouth 2 (two) times daily., Disp: 60 tablet, Rfl: 11    coenzyme Q10 100 mg capsule, Take 100 mg by mouth every morning., Disp: , Rfl:     dextrose 5 % SolP 100 mL with ceftolozane-tazobactam 1.5 gram SolR 3,000 mg injection, Inject 3,000 mg into the vein every 8 (eight) hours. for 21 days, Disp: , Rfl:     dorzolamide-timolol 2-0.5% (COSOPT) 22.3-6.8 mg/mL ophthalmic solution, Place 1 drop into both eyes 2 (two) times daily., Disp: 1 Bottle, Rfl: 11    ezetimibe (ZETIA) 10 mg tablet, TAKE 1 TABLET(10 MG) BY MOUTH EVERY DAY, Disp: 30 tablet, Rfl: 0    fish  "oil-omega-3 fatty acids 300-1,000 mg capsule, Take 1 capsule by mouth 2 (two) times daily., Disp: 60 capsule, Rfl: 3    insulin aspart U-100 (NOVOLOG) 100 unit/mL InPn pen, Use on premeal readings: 150-200=+2, 201-250=+4; 251-300=+6; 301-350=+8, over 350=+10 units, Disp: 2.7 mL, Rfl: 11    losartan (COZAAR) 25 MG tablet, Take 1 tablet (25 mg total) by mouth once daily., Disp: 90 tablet, Rfl: 3    metOLazone (ZAROXOLYN) 5 MG tablet, Take 1 tablet (5 mg total) by mouth every Monday and Thursday. 30 minutes before lasix., Disp: 30 tablet, Rfl: 2    MULTIVIT,THER IRON,CA,FA & MIN (MULTIVITAMIN) Tab, Take 1 tablet by mouth every morning. , Disp: , Rfl:     oxyCODONE-acetaminophen (PERCOCET) 5-325 mg per tablet, Take 1 tablet by mouth every 6 (six) hours as needed for Pain., Disp: 45 tablet, Rfl: 0    pen needle, diabetic 31 gauge x 1/4" Ndle, Use as directed, Disp: 100 each, Rfl: 11    carvedilol (COREG) 6.25 MG tablet, , Disp: , Rfl: 11    insulin degludec-liraglutide (XULTOPHY 100/3.6) 100 unit-3.6 mg /mL (3 mL) InPn, Inject 42 Units into the skin once daily., Disp: 5 Syringe, Rfl: 5    insulin glargine-lixisenatide (SOLIQUA 100/33) 100 unit-33 mcg/mL InPn, Inject 34 Units into the skin once daily., Disp: , Rfl:     miconazole NITRATE 2 % (MICOTIN) 2 % top powder, Apply topically 2 (two) times daily., Disp: , Rfl: 0    SANTYL ointment, FEDERICO TOPICALLY ONCE D, Disp: , Rfl: 0    torsemide (DEMADEX) 20 MG Tab, Take 4 tablets (80 mg total) by mouth 2 (two) times daily., Disp: 120 tablet, Rfl: 0  No current facility-administered medications for this visit.     Facility-Administered Medications Ordered in Other Visits:     lactated ringers infusion, , Intravenous, Continuous, Tam Reynolds MD    lidocaine (PF) 10 mg/ml (1%) injection 10 mg, 1 mL, Intradermal, Once, Tam Reynolds MD    REVIEW OF SYSTEMS:  General: No fevers or chills; ENT: No sore throat; Allergy and Immunology: no persistent infections; " Hematological and Lymphatic: No history of bleeding or easy bruising; Endocrine: negative; Respiratory: no cough, shortness of breath, or wheezing; Cardiovascular: no chest pain or dyspnea on exertion; Gastrointestinal: no abdominal pain/back, change in bowel habits, or bloody stools; Genito-Urinary: no dysuria, trouble voiding, or hematuria; Musculoskeletal: negative; Neurological: no TIA or stroke symptoms; Psychiatric: no nervousness, anxiety or depression.    PHYSICAL EXAM:      Pulse: (!) 59         General appearance:  Alert, well-appearing, and in no distress.  Oriented to person, place, and time                    Neurological: Normal speech, no focal findings noted; CN II - XII grossly intact. RLE with sensation to light touch, LLE with sensation to light touch.            Musculoskeletal: Digits/nail without cyanosis/clubbing.  Strength 5/5 BLE.                    Neck: Supple, no significant adenopathy                  Chest:  Clear to auscultation, no wheezes, rales or rhonchi, symmetric air entry. No use of accessory muscles               Cardiac: Normal rate and regular rhythm, S1 and S2 normal            Abdomen: Soft, nontender, nondistended, no masses or organomegaly, no hernia     No rebound tenderness noted; bowel sounds normal     No groin adenopathy      Extremities:   2+ R femoral pulse, 2+ L femoral pulse     doppler+ R popliteal pulse, doppler+ L popliteal pulse     doppler+ R PT pulse, doppler+ L PT pulse     doppler+ R DP pulse, doppler+ L DP pulse     1+ RLE edema, 2+ LLE edema    Skin: LLE with blistering superficial anterior lower leg wound, minimal brawny edema, +large foot wound with minimal fibrinous and improved granulation tissue, no odor, no purulence or erythema    LAB RESULTS:  No results found for: CBC  Lab Results   Component Value Date    LABPROT 10.7 03/10/2019    INR 1.0 03/10/2019     Lab Results   Component Value Date     (L) 03/25/2019    K 3.9 03/25/2019    CL 95  03/25/2019    CO2 30 (H) 03/25/2019    GLU 67 (L) 03/25/2019     (H) 03/25/2019    CREATININE 2.2 (H) 03/25/2019    CALCIUM 9.0 03/25/2019    ANIONGAP 9 03/25/2019    EGFRNONAA 31 (A) 03/25/2019     Lab Results   Component Value Date    WBC 5.96 03/25/2019    RBC 3.28 (L) 03/25/2019    HGB 8.6 (L) 03/25/2019    HCT 27.3 (L) 03/25/2019    MCV 83 03/25/2019    MCH 26.2 (L) 03/25/2019    MCHC 31.5 (L) 03/25/2019    RDW 20.7 (H) 03/25/2019     03/25/2019    MPV 9.2 03/25/2019    GRAN 4.4 03/25/2019    GRAN 74.4 (H) 03/25/2019    LYMPH 1.0 03/25/2019    LYMPH 16.3 (L) 03/25/2019    MONO 0.3 03/25/2019    MONO 4.4 03/25/2019    EOS 0.3 03/25/2019    BASO 0.04 03/25/2019    EOSINOPHIL 4.2 03/25/2019    BASOPHIL 0.7 03/25/2019    DIFFMETHOD Automated 03/25/2019     .  Lab Results   Component Value Date    HGBA1C 6.7 (H) 03/11/2019       IMAGING:  All pertinent imaging has been reviewed and interpreted independently.    BLE arterial US 1/2019: No focal stenosis    IMP/PLAN:  59 y.o. male with   Patient Active Problem List   Diagnosis    Epiretinal membrane, both eyes    Nuclear sclerotic cataract of right eye    Pseudophakia, left eye    Ptosis, left eyelid    DM type 2 with diabetic peripheral neuropathy    HLD (hyperlipidemia)    Neovascular glaucoma of both eyes    Tophaceous gout    Essential hypertension    CAD (coronary artery disease)    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 edema, with long-term current use of insulin    Neovascular glaucoma of left eye, severe stage    Statin myopathy    Neovascular glaucoma, right eye, mild stage    Left leg cellulitis    PVD (peripheral vascular disease)    Right wrist pain    Multiple open wounds of lower leg    Hepatosplenomegaly    Non-pressure chronic ulcer of other part of left foot with fat layer exposed    Type 2 diabetes mellitus with left diabetic foot ulcer    Open wound  of left foot    Cellulitis of left lower extremity    Diabetic foot infection    Other ascites    Severe malnutrition    Goals of care, counseling/discussion    Alteration in skin integrity    MDR Acinetobacter baumannii infection    Takes dietary supplements    Intertriginous dermatitis associated with moisture    Debility    Infection due to multidrug-resistant Pseudomonas aeruginosa    Subacute osteomyelitis of left foot    Ascites    Acute on chronic combined systolic and diastolic congestive heart failure    Stage 3 chronic kidney disease    being managed by PCP and specialists who is here today for evaluation of L foot wound.    -L foot wound improved s/p debridement 2/13/19, multifactorial due to diabetes, PVD and venous insufficiency with improvement in granulation tissue on Abx due to multidrug resistent bacteria in the past - have d/w pt and family re: risk of bacteremia and progression of infection despite improvement in perfusion and wound bed; amputation options are limited due to venous stasis changes to below left knee leg - Pt states he desires to continue Abx and local wound care; does not desire amputation at this time  -L foot wound improving - will obtain arterial US and SIDRA to further eval perfusion s/p angioplasty; he would benefit from continued aggressive wound care   -Cont ID rec Abx regimen  -Recommend daily Santyl to left foot wounds with cast padding and dry Kerlix overlying  -RecBLE Xeroform and dry kerlix wraps twice daily to shin regions with elevation of LLE above level of the heart  -Low sodium diet  -Strict glycemic control  -Cont Abx per ID recs  -Will call with imaging results to discuss further plan of care - LLE angiogram vs L foot wound debridement; possible skin coverage in future    I spent 20 minutes evaluating this patient and greater than 50% of the time was spent counseling, coordinator care and discussing the plan of care.  All questions were answered and  patient stated understanding with agreement with the above treatment plan.    Mitch Chan MD [facility]  Vascular and Endovascular Surgery

## 2019-03-29 ENCOUNTER — TELEPHONE (OUTPATIENT)
Dept: INFECTIOUS DISEASES | Facility: CLINIC | Age: 59
End: 2019-03-29

## 2019-03-29 ENCOUNTER — TELEPHONE (OUTPATIENT)
Dept: VASCULAR SURGERY | Facility: CLINIC | Age: 59
End: 2019-03-29

## 2019-03-29 ENCOUNTER — LAB VISIT (OUTPATIENT)
Dept: LAB | Facility: HOSPITAL | Age: 59
End: 2019-03-29
Attending: INTERNAL MEDICINE
Payer: COMMERCIAL

## 2019-03-29 DIAGNOSIS — L03.116 LEFT LEG CELLULITIS: ICD-10-CM

## 2019-03-29 DIAGNOSIS — N17.9 ACUTE KIDNEY INJURY: Primary | ICD-10-CM

## 2019-03-29 LAB
ANION GAP SERPL CALC-SCNC: 10 MMOL/L (ref 8–16)
BUN SERPL-MCNC: 130 MG/DL (ref 8–23)
CALCIUM SERPL-MCNC: 8.9 MG/DL (ref 8.7–10.5)
CHLORIDE SERPL-SCNC: 97 MMOL/L (ref 95–110)
CO2 SERPL-SCNC: 29 MMOL/L (ref 23–29)
CREAT SERPL-MCNC: 2.7 MG/DL (ref 0.5–1.4)
EST. GFR  (AFRICAN AMERICAN): 28 ML/MIN/1.73 M^2
EST. GFR  (NON AFRICAN AMERICAN): 24 ML/MIN/1.73 M^2
GLUCOSE SERPL-MCNC: 78 MG/DL (ref 70–110)
POTASSIUM SERPL-SCNC: 3.8 MMOL/L (ref 3.5–5.1)
SODIUM SERPL-SCNC: 136 MMOL/L (ref 136–145)

## 2019-03-29 PROCEDURE — 80048 BASIC METABOLIC PNL TOTAL CA: CPT

## 2019-03-29 PROCEDURE — 36415 COLL VENOUS BLD VENIPUNCTURE: CPT

## 2019-03-29 NOTE — TELEPHONE ENCOUNTER
Call and spoke with sister that Dr. Chan's clinic notes were faxed to [facility] as requested. Also explained that he would have ultrasound of his leg on April 3rd. Explained that about 2 days after Dr. Chan or the office would be calling them. She stated understanding.

## 2019-03-29 NOTE — TELEPHONE ENCOUNTER
Arrange follow up with nephrology.  We will stop his antibiotics.  Arrange for the picc line to be removed.

## 2019-03-29 NOTE — TELEPHONE ENCOUNTER
"Spoke to Ping poe/Tootie ([phone]) advised will be stopping abx and arrange for Picc to be removed per Dr West.    Also scheduled appt w/Nephrology (Dr Walker) first available appt [facility] is 5/1/19 at 1pm.    When called to advise patient of above, I spoke to Chloé (patient sister/caretaker) at [phone] and she would like to speak with you re: if the "infection is gone"?  "

## 2019-04-03 ENCOUNTER — HOSPITAL ENCOUNTER (OUTPATIENT)
Dept: CARDIOLOGY | Facility: HOSPITAL | Age: 59
Discharge: HOME | End: 2019-04-03
Attending: SURGERY
Payer: COMMERCIAL

## 2019-04-03 DIAGNOSIS — S91.302D OPEN WOUND OF LEFT FOOT, SUBSEQUENT ENCOUNTER: ICD-10-CM

## 2019-04-03 PROCEDURE — 93923 CV US LOWER EXTREMITY RESTING SEGMENTAL PRESSURES (CUPID ONLY): ICD-10-PCS | Mod: 26,,, | Performed by: SURGERY

## 2019-04-03 PROCEDURE — 93925 CV US DOPPLER ARTERIAL LEGS BILATERAL (CUPID ONLY): ICD-10-PCS | Mod: 26,,, | Performed by: SURGERY

## 2019-04-03 PROCEDURE — 93925 LOWER EXTREMITY STUDY: CPT | Mod: 50

## 2019-04-03 PROCEDURE — 93923 UPR/LXTR ART STDY 3+ LVLS: CPT | Mod: 50

## 2019-04-03 PROCEDURE — 93925 LOWER EXTREMITY STUDY: CPT | Mod: 26,,, | Performed by: SURGERY

## 2019-04-03 PROCEDURE — 93923 UPR/LXTR ART STDY 3+ LVLS: CPT | Mod: 26,,, | Performed by: SURGERY

## 2019-04-04 LAB
LEFT ABI: 1.13
LEFT ANT TIBIAL SYS PSV: 82 CM/S
LEFT ARM BP: 104 MMHG
LEFT CALF BP: 162 MMHG
LEFT CFA PSV: 125 CM/S
LEFT DORSALIS PEDIS: 117 MMHG
LEFT EXTERNAL ILIAC PSV: 73 CM/S
LEFT LOWER LEG BP: 120 MMHG
LEFT PERONEAL SYS PSV: 43 CM/S
LEFT POPLITEAL PSV: 83 CM/S
LEFT POST TIBIAL SYS PSV: 0 CM/S
LEFT POSTERIOR TIBIAL: 47 MMHG
LEFT PROFUNDA SYS PSV: 86 CM/S
LEFT SUPER FEMORAL DIST SYS PSV: 113 CM/S
LEFT SUPER FEMORAL MID SYS PSV: 174 CM/S
LEFT SUPER FEMORAL OSTIAL SYS PSV: 109 CM/S
LEFT SUPER FEMORAL PROX SYS PSV: 83 CM/S
LEFT TIB/PER TRUNK SYS PSV: 95 CM/S
LEFT UPPER LEG BP: 106 MMHG
RIGHT ABI: 2.45
RIGHT ANT TIBIAL SYS PSV: 44 CM/S
RIGHT ARM BP: 97 MMHG
RIGHT CALF BP: 102 MMHG
RIGHT CFA PSV: 95 CM/S
RIGHT DORSALIS PEDIS: 255 MMHG
RIGHT EXTERNAL ILLIAC PSV: 93 CM/S
RIGHT LOWER LEG BP: 128 MMHG
RIGHT PERONEAL SYS PSV: 99 CM/S
RIGHT POPLITEAL PSV: 93 CM/S
RIGHT POST TIBIAL SYS PSV: 86 CM/S
RIGHT POSTERIOR TIBIAL: 14 MMHG
RIGHT PROFUNDA SYS PSV: 123 CM/S
RIGHT SUPER FEMORAL DIST SYS PSV: 70 CM/S
RIGHT SUPER FEMORAL MID SYS PSV: 172 CM/S
RIGHT SUPER FEMORAL OSTIAL SYS PSV: 80 CM/S
RIGHT SUPER FEMORAL PROX SYS PSV: 92 CM/S
RIGHT TIB/PER TRUNK SYS PSV: 181 CM/S
RIGHT UPPER LEG BP: 124 MMHG

## 2019-04-09 ENCOUNTER — TELEPHONE (OUTPATIENT)
Dept: VASCULAR SURGERY | Facility: CLINIC | Age: 59
End: 2019-04-09

## 2019-04-09 NOTE — TELEPHONE ENCOUNTER
----- Message from Delmis Chung sent at 4/9/2019  9:59 AM CDT -----  Contact: pt's sister rhina [phone]  Name of Who is Calling: rhnia      What is the request in detail: pt would like to get test results. Call pt      Can the clinic reply by [facility]: no      What Number to Call Back if not in [facility]: pt's sister rhina [phone]      [phone] Call to Ms. Luevano and explained that after reviewing the results he wants to do a angio on his leg. It will be scheduled for the 30th and once the orders are in office will contact for a preop date for her brother. She was wondering if the ultrasounds were worse then last time. Explained that he got some test that were a little different but that he was concerned for some stenosis as he is scheduling a surgery to get a better look and to open up areas if needed. She stated understanding. Explained would let Dr. Chan know she was asking about test results.

## 2019-04-11 RX ORDER — TORSEMIDE 20 MG/1
80 TABLET ORAL 2 TIMES DAILY
Qty: 120 TABLET | Refills: 0 | Status: SHIPPED | OUTPATIENT
Start: 2019-04-11 | End: 2019-05-02 | Stop reason: SDUPTHER

## 2019-04-12 ENCOUNTER — TELEPHONE (OUTPATIENT)
Dept: VASCULAR SURGERY | Facility: CLINIC | Age: 59
End: 2019-04-12

## 2019-04-12 DIAGNOSIS — I70.244 ATHEROSCLEROSIS OF NATIVE ARTERY OF LEFT LOWER EXTREMITY WITH ULCERATION OF MIDFOOT: Primary | ICD-10-CM

## 2019-04-12 NOTE — TELEPHONE ENCOUNTER
Left voicemail about her brother's preop appointment. Left date, time and location. Explained about surgery date. Left callback numbers for any questions.

## 2019-04-23 ENCOUNTER — HOSPITAL ENCOUNTER (OUTPATIENT)
Dept: PREADMISSION TESTING | Facility: HOSPITAL | Age: 59
Discharge: HOME | End: 2019-04-23
Attending: SURGERY
Payer: COMMERCIAL

## 2019-04-23 ENCOUNTER — OFFICE VISIT (OUTPATIENT)
Dept: INFECTIOUS DISEASES | Facility: CLINIC | Age: 59
End: 2019-04-23
Payer: COMMERCIAL

## 2019-04-23 VITALS
HEIGHT: 70 IN | TEMPERATURE: 98 F | SYSTOLIC BLOOD PRESSURE: 88 MMHG | BODY MASS INDEX: 25.31 KG/M2 | RESPIRATION RATE: 17 BRPM | WEIGHT: 176.81 LBS | OXYGEN SATURATION: 100 % | DIASTOLIC BLOOD PRESSURE: 53 MMHG | HEART RATE: 88 BPM

## 2019-04-23 VITALS
HEART RATE: 85 BPM | SYSTOLIC BLOOD PRESSURE: 93 MMHG | BODY MASS INDEX: 25.06 KG/M2 | WEIGHT: 175.06 LBS | TEMPERATURE: 98 F | DIASTOLIC BLOOD PRESSURE: 56 MMHG | HEIGHT: 70 IN

## 2019-04-23 DIAGNOSIS — I70.244: ICD-10-CM

## 2019-04-23 DIAGNOSIS — I70.244 ATHEROSCLEROSIS OF NATIVE ARTERY OF LEFT LOWER EXTREMITY WITH ULCERATION OF MIDFOOT: Primary | ICD-10-CM

## 2019-04-23 DIAGNOSIS — M86.272 SUBACUTE OSTEOMYELITIS OF LEFT FOOT: Primary | ICD-10-CM

## 2019-04-23 LAB
ANION GAP SERPL CALC-SCNC: 14 MMOL/L (ref 8–16)
ANISOCYTOSIS BLD QL SMEAR: SLIGHT
APTT BLDCRRT: 29.2 SEC (ref 21–32)
BASOPHILS # BLD AUTO: 0.02 K/UL (ref 0–0.2)
BASOPHILS NFR BLD: 0.3 % (ref 0–1.9)
BUN SERPL-MCNC: 112 MG/DL (ref 8–23)
CALCIUM SERPL-MCNC: 9.3 MG/DL (ref 8.7–10.5)
CHLORIDE SERPL-SCNC: 93 MMOL/L (ref 95–110)
CO2 SERPL-SCNC: 26 MMOL/L (ref 23–29)
CREAT SERPL-MCNC: 1.9 MG/DL (ref 0.5–1.4)
DACRYOCYTES BLD QL SMEAR: ABNORMAL
DIFFERENTIAL METHOD: ABNORMAL
EOSINOPHIL # BLD AUTO: 0.3 K/UL (ref 0–0.5)
EOSINOPHIL NFR BLD: 4.7 % (ref 0–8)
ERYTHROCYTE [DISTWIDTH] IN BLOOD BY AUTOMATED COUNT: 19.4 % (ref 11.5–14.5)
EST. GFR  (AFRICAN AMERICAN): 43 ML/MIN/1.73 M^2
EST. GFR  (NON AFRICAN AMERICAN): 37 ML/MIN/1.73 M^2
GLUCOSE SERPL-MCNC: 111 MG/DL (ref 70–110)
HCT VFR BLD AUTO: 28.8 % (ref 40–54)
HGB BLD-MCNC: 9.1 G/DL (ref 14–18)
HYPOCHROMIA BLD QL SMEAR: ABNORMAL
INR PPP: 1 (ref 0.8–1.2)
LYMPHOCYTES # BLD AUTO: 0.6 K/UL (ref 1–4.8)
LYMPHOCYTES NFR BLD: 9.5 % (ref 18–48)
MAGNESIUM SERPL-MCNC: 1.7 MG/DL (ref 1.6–2.6)
MCH RBC QN AUTO: 26.5 PG (ref 27–31)
MCHC RBC AUTO-ENTMCNC: 31.6 G/DL (ref 32–36)
MCV RBC AUTO: 84 FL (ref 82–98)
MONOCYTES # BLD AUTO: 0.7 K/UL (ref 0.3–1)
MONOCYTES NFR BLD: 10.9 % (ref 4–15)
NEUTROPHILS # BLD AUTO: 5.1 K/UL (ref 1.8–7.7)
NEUTROPHILS NFR BLD: 74.7 % (ref 38–73)
PHOSPHATE SERPL-MCNC: 4.5 MG/DL (ref 2.7–4.5)
PLATELET # BLD AUTO: 357 K/UL (ref 150–350)
PLATELET BLD QL SMEAR: ABNORMAL
PMV BLD AUTO: 9.4 FL (ref 9.2–12.9)
POLYCHROMASIA BLD QL SMEAR: ABNORMAL
POTASSIUM SERPL-SCNC: 3.4 MMOL/L (ref 3.5–5.1)
PROTHROMBIN TIME: 10.9 SEC (ref 9–12.5)
RBC # BLD AUTO: 3.44 M/UL (ref 4.6–6.2)
SODIUM SERPL-SCNC: 133 MMOL/L (ref 136–145)
TARGETS BLD QL SMEAR: ABNORMAL
WBC # BLD AUTO: 6.77 K/UL (ref 3.9–12.7)

## 2019-04-23 PROCEDURE — 36415 COLL VENOUS BLD VENIPUNCTURE: CPT

## 2019-04-23 PROCEDURE — 84100 ASSAY OF PHOSPHORUS: CPT

## 2019-04-23 PROCEDURE — 99999 PR PBB SHADOW E&M-EST. PATIENT-LVL III: CPT | Mod: PBBFAC,,, | Performed by: INTERNAL MEDICINE

## 2019-04-23 PROCEDURE — 3078F DIAST BP <80 MM HG: CPT | Mod: CPTII,S$GLB,, | Performed by: INTERNAL MEDICINE

## 2019-04-23 PROCEDURE — 85730 THROMBOPLASTIN TIME PARTIAL: CPT

## 2019-04-23 PROCEDURE — 99999 PR PBB SHADOW E&M-EST. PATIENT-LVL III: ICD-10-PCS | Mod: PBBFAC,,, | Performed by: INTERNAL MEDICINE

## 2019-04-23 PROCEDURE — 99214 OFFICE O/P EST MOD 30 MIN: CPT | Mod: S$GLB,,, | Performed by: INTERNAL MEDICINE

## 2019-04-23 PROCEDURE — 3078F PR MOST RECENT DIASTOLIC BLOOD PRESSURE < 80 MM HG: ICD-10-PCS | Mod: CPTII,S$GLB,, | Performed by: INTERNAL MEDICINE

## 2019-04-23 PROCEDURE — 83735 ASSAY OF MAGNESIUM: CPT

## 2019-04-23 PROCEDURE — 3008F PR BODY MASS INDEX (BMI) DOCUMENTED: ICD-10-PCS | Mod: CPTII,S$GLB,, | Performed by: INTERNAL MEDICINE

## 2019-04-23 PROCEDURE — 99214 PR OFFICE/OUTPT VISIT, EST, LEVL IV, 30-39 MIN: ICD-10-PCS | Mod: S$GLB,,, | Performed by: INTERNAL MEDICINE

## 2019-04-23 PROCEDURE — 3008F BODY MASS INDEX DOCD: CPT | Mod: CPTII,S$GLB,, | Performed by: INTERNAL MEDICINE

## 2019-04-23 PROCEDURE — 85025 COMPLETE CBC W/AUTO DIFF WBC: CPT

## 2019-04-23 PROCEDURE — 80048 BASIC METABOLIC PNL TOTAL CA: CPT

## 2019-04-23 PROCEDURE — 3074F PR MOST RECENT SYSTOLIC BLOOD PRESSURE < 130 MM HG: ICD-10-PCS | Mod: CPTII,S$GLB,, | Performed by: INTERNAL MEDICINE

## 2019-04-23 PROCEDURE — 85610 PROTHROMBIN TIME: CPT

## 2019-04-23 PROCEDURE — 3074F SYST BP LT 130 MM HG: CPT | Mod: CPTII,S$GLB,, | Performed by: INTERNAL MEDICINE

## 2019-04-23 NOTE — PROGRESS NOTES
Subjective:      Patient ID: Marvin Ray is a 59 y.o. male.    Chief Complaint:No chief complaint on file.      History of Present Illness    59 y/o male with a history of DM type 2, CAD with CHF s/p AICD placement recently admitted to the hospital for a polymicrobial infection of his left foot.  He is being followed by Dr. Chan with vascular surgery.  He is s/p debridement of his foot on 11/16/18.  He underwent subsequent debridements on 12/5/18 (with amputation of left great toe) and on December 28.  Cultures from his foot wounds have been positive for VRE, stenotrophomonas sp, and MDR Pseudomonas sp.  He was only treated with daptomycin initially due to concerns for renal toxicity with use of bactrim for the stenotrophomonas and aminoglycoside for the pseudomonas.  He completed this course of antibiotics.    He underwent further debridement of his left foot by Dr. Chan on February 13.  Cultures from that surgical procedure are positive for pseudomonas putida and acinetobacter sp.  Both pathogens have significant resistance mutations.  He was started on high dose zerbaxa and completed around 4 weeks of this.  It was discontinued due to a decline in his renal function.  He is here today for follow up.       Review of Systems   Constitution: Positive for malaise/fatigue and weight loss. Negative for chills, decreased appetite, fever, night sweats and weight gain.   HENT: Positive for congestion and hoarse voice. Negative for ear pain, hearing loss, sore throat and tinnitus.    Eyes: Negative for blurred vision, redness and visual disturbance.   Cardiovascular: Negative for chest pain, leg swelling and palpitations.   Respiratory: Negative for cough, hemoptysis, shortness of breath and sputum production.    Hematologic/Lymphatic: Negative for adenopathy. Bruises/bleeds easily.   Skin: Positive for itching. Negative for dry skin, rash and suspicious lesions.   Musculoskeletal: Negative for back pain,  joint pain, myalgias and neck pain.   Gastrointestinal: Positive for nausea. Negative for abdominal pain, constipation, diarrhea, heartburn and vomiting.   Genitourinary: Negative for dysuria, flank pain, frequency, hematuria, hesitancy and urgency.   Neurological: Positive for paresthesias. Negative for dizziness, headaches, numbness and weakness.   Psychiatric/Behavioral: Negative for depression and memory loss. The patient does not have insomnia and is not nervous/anxious.      Objective:   Physical Exam   Constitutional: He is oriented to person, place, and time. He appears well-developed and well-nourished. No distress.   HENT:   Head: Normocephalic and atraumatic.   Right Ear: External ear normal.   Left Ear: External ear normal.   Nose: Nose normal.   Mouth/Throat: Oropharynx is clear and moist. No oropharyngeal exudate.   Eyes: Pupils are equal, round, and reactive to light. Conjunctivae and EOM are normal. Right eye exhibits no discharge. Left eye exhibits no discharge. No scleral icterus.   Neck: Normal range of motion. Neck supple. No JVD present. No tracheal deviation present. No thyromegaly present.   Cardiovascular: Normal rate, regular rhythm and intact distal pulses. Exam reveals no gallop and no friction rub.   No murmur heard.  Pulmonary/Chest: Effort normal and breath sounds normal. No stridor. No respiratory distress. He has no wheezes. He has no rales. He exhibits no tenderness.   Abdominal: Soft. Bowel sounds are normal. He exhibits no distension and no mass. There is no tenderness. There is no rebound and no guarding.   Musculoskeletal: Normal range of motion. He exhibits no edema or tenderness.   All toes of left foot were amputated.  The is an open wound over this distal foot.   Lymphadenopathy:     He has no cervical adenopathy.   Neurological: He is alert and oriented to person, place, and time. He has normal reflexes. He displays normal reflexes. No cranial nerve deficit. He exhibits  normal muscle tone. Coordination normal.   Skin: Skin is warm. No rash noted. He is not diaphoretic. No erythema. No pallor.   Psychiatric: He has a normal mood and affect. His behavior is normal. Judgment and thought content normal.   Nursing note and vitals reviewed.       Left foot plantar surface      Left foot medial surface        Left foot dorsal surface          Assessment:       1. Subacute osteomyelitis of left foot        58 y/o with chronic wounds of left foot complicated by infections with multi drug resistant organisms.  Patient is s/p zerbaxa for 4 weeks.  Wounds examined and don't appear infected currently.  Will hold off on antibiotics for now.  He is to continue local wound care.    Plan:       Subacute osteomyelitis of left foot   -continue local wound care   -follow up as needed

## 2019-04-23 NOTE — DISCHARGE INSTRUCTIONS
Your surgery is scheduled for Tuesday April 30, 2019____________________.    Call [phone] between 2 p.m. and 5 p.m. on   __Monday_ to find out your arrival time for the day of your surgery.      Please report to SAME DAY SURGERY UNIT on the 2nd FLOOR at _______ a.m.  Use front door entrance. The doors open at 0530 am.          INSTRUCTIONS IMPORTANT!!!  ¨ Do not eat or drink after 12 midnight-including water. OK to brush teeth, no   gum, candy or mints!    ¨ Take only these medicines with a small swallow of water-morning of surgery.  Take Losartan with water morning of surgery.       _x___  Prep instructions:    SHOWER     _x___  Please shower using Hibiclens soap the night before AND  the morning of  your surgery/procedure. Do not use Hibiclens on your face or genitals                _x___  No shaving of procedural area at least 4-5 days before surgery due to  increased risk of skin irritation and/or possible infection.  _x___  No powder, lotions or creams to your body.  _x___  You may wear only deodorant on the day of surgery.  _x___  Please remove all jewelry, including piercings and leave at home.  ____  No money or valuables needed. Please leave at home.  You may bring your cell phone.  ____  Please bring any documents given by your doctor.  _x___  If going home the same day, arrange for a ride home. You will not be able to drive if Anesthesia was used.  ____  Children under 18 years require a parent / guardian present the entire time   they are in surgery / recovery.  _x___  Wear loose fitting clothing. Allow for dressings, bandages.  ____  Stop Aspirin, Ibuprofen, Motrin and Aleve at least 3-5 days before surgery, unless otherwise instructed by your doctor, or the nurse.              You MAY use Tylenol/acetaminophen until day of surgery.  _x___  If you take diabetic medication, do not take am of surgery unless instructed by Doctor.  _x___  Call MD for temperature above 101 degrees.        ____ Stop taking  any Fish Oil supplement or any Vitamins that contain Vitamin E at least 5 days prior to surgery.              I have read or had read and explained to me, and understand the above information.  Additional comments or instructions:Please call   [phone] if you have any questions regarding the instructions above.

## 2019-04-26 ENCOUNTER — PROCEDURE VISIT (OUTPATIENT)
Dept: OPHTHALMOLOGY | Facility: CLINIC | Age: 59
End: 2019-04-26
Payer: COMMERCIAL

## 2019-04-26 ENCOUNTER — TELEPHONE (OUTPATIENT)
Dept: VASCULAR SURGERY | Facility: CLINIC | Age: 59
End: 2019-04-26

## 2019-04-26 VITALS — HEART RATE: 90 BPM | SYSTOLIC BLOOD PRESSURE: 90 MMHG | DIASTOLIC BLOOD PRESSURE: 56 MMHG

## 2019-04-26 DIAGNOSIS — H40.53X2 NEOVASCULAR GLAUCOMA OF BOTH EYES, MODERATE STAGE: Chronic | ICD-10-CM

## 2019-04-26 DIAGNOSIS — H35.373 EPIRETINAL MEMBRANE, BOTH EYES: ICD-10-CM

## 2019-04-26 PROCEDURE — 92134 CPTRZ OPH DX IMG PST SGM RTA: CPT | Mod: S$GLB,,, | Performed by: OPHTHALMOLOGY

## 2019-04-26 PROCEDURE — 92134 POSTERIOR SEGMENT OCT RETINA (OCULAR COHERENCE TOMOGRAPHY)-BOTH EYES: ICD-10-PCS | Mod: S$GLB,,, | Performed by: OPHTHALMOLOGY

## 2019-04-26 PROCEDURE — 67028 PR INJECT INTRAVITREAL PHARMCOLOGIC: ICD-10-PCS | Mod: RT,S$GLB,, | Performed by: OPHTHALMOLOGY

## 2019-04-26 PROCEDURE — 67028 INJECTION EYE DRUG: CPT | Mod: RT,S$GLB,, | Performed by: OPHTHALMOLOGY

## 2019-04-26 PROCEDURE — 92014 PR EYE EXAM, EST PATIENT,COMPREHESV: ICD-10-PCS | Mod: 25,S$GLB,, | Performed by: OPHTHALMOLOGY

## 2019-04-26 PROCEDURE — 92014 COMPRE OPH EXAM EST PT 1/>: CPT | Mod: 25,S$GLB,, | Performed by: OPHTHALMOLOGY

## 2019-04-26 RX ORDER — FUROSEMIDE 20 MG/1
TABLET ORAL
COMMUNITY
Start: 2019-04-24 | End: 2019-06-10

## 2019-04-26 RX ADMIN — Medication 1.25 MG: at 11:04

## 2019-04-26 NOTE — PATIENT INSTRUCTIONS

## 2019-04-26 NOTE — TELEPHONE ENCOUNTER
Spoke to sister and let her know that Mr. Ray's kidney function was decreased from his last surgery he had with Dr. Chan. Explained Dr. Chan stated the benefit outweighs the risk and that he still wants to go through with the angiogram but will use CO2 but patient will still receive some contrast, and will receive extra fluids to help. She stated understanding.

## 2019-04-26 NOTE — PROGRESS NOTES
HPI     3 month follow, diabetic eye exam/.  Pt states vision is currently stable at present time.    EYE MEDS  Alphangan Bid ou  Cosopt Bid ou    Last edited by Mike Mccoy on 4/26/2019 10:26 AM. (History)          OCT : OD shows central DME, stable   OS ERM with DME central cyst, stable    Prior FA: shows macular leakage OU, significant NP OU, no NV OU, enlarged SURINDER OU    A/P    1. PDR s/p PRP OU (DM2), with CSME OU (and HTN)  - Encouraged good BS/BP/Cholesterol control  T2 uncontrolled on insulin  S/p PRP fill in 11/18    2. DME OU, OD>OS  - S/p Avastin OD x 17, OS x 7  - Will monitor OS for now given NVG and ERM  - Could consider Ozurdex but pt is POAG and phakic, IOP good right now)  With some NVI/NVA post CE OD  Will inject avastin q 3 month for now    - Avastin OD today      2. NVG OS   FH:   CCT:449 // 540    Gonio: OD CBB; OS sup CBB/nasal small NV at 9:00/inf PAS/temp CBB with PAS   Surgeries:;  phaco/BV 2012   Lasers: SLT   Tmax:    Tbase (before treatment):   Ttarget:     Drop intolerance:    APD:     - Stopped Xalatan OD due to macular edema  - pt reports good adherence  - No NVA OD. 1 NVA vessel at 9 oclock OS  - Cont drops as above    Sees KL    3. PCIOL OD  Some NVA noted after CE      4. PSK OS  - Stable, CTM, RD precautions       5. ERM OS>OD  - Given NVG hx, monitor  - CTM      3 months OCT    Risks, benefits, and alternatives to treatment discussed in detail with the patient.  The patient voiced understanding and wished to proceed with the procedure    Injection Procedure Note:  Diagnosis: PDR/NVG and DME OD    Patient Identified and Time Out complete  Topical Proparacaine and Betadine.  Inject Avastin OD at 6:00 @ 3.5-4mm posterior to limbus  Post Operative Dx: Same  Complications: None  Follow up as above.

## 2019-04-29 DIAGNOSIS — E78.5 HYPERLIPIDEMIA LDL GOAL <70: ICD-10-CM

## 2019-04-29 RX ORDER — EZETIMIBE 10 MG/1
TABLET ORAL
Qty: 90 TABLET | Refills: 0 | Status: SHIPPED | OUTPATIENT
Start: 2019-04-29 | End: 2019-07-26 | Stop reason: SDUPTHER

## 2019-04-30 ENCOUNTER — HOSPITAL ENCOUNTER (OUTPATIENT)
Facility: HOSPITAL | Age: 59
Discharge: HOME | End: 2019-04-30
Attending: SURGERY | Admitting: SURGERY
Payer: COMMERCIAL

## 2019-04-30 VITALS
HEIGHT: 70 IN | HEART RATE: 92 BPM | RESPIRATION RATE: 18 BRPM | TEMPERATURE: 98 F | OXYGEN SATURATION: 98 % | WEIGHT: 176.81 LBS | BODY MASS INDEX: 25.31 KG/M2 | SYSTOLIC BLOOD PRESSURE: 113 MMHG | DIASTOLIC BLOOD PRESSURE: 64 MMHG

## 2019-04-30 DIAGNOSIS — I73.9 PVD (PERIPHERAL VASCULAR DISEASE): ICD-10-CM

## 2019-04-30 DIAGNOSIS — I70.244: Primary | ICD-10-CM

## 2019-04-30 DIAGNOSIS — I70.244 ATHEROSCLEROSIS OF NATIVE ARTERY OF LEFT LOWER EXTREMITY WITH ULCERATION OF MIDFOOT: ICD-10-CM

## 2019-04-30 LAB — POCT GLUCOSE: 115 MG/DL (ref 70–110)

## 2019-04-30 PROCEDURE — 25500020 PHARM REV CODE 255: Performed by: SURGERY

## 2019-04-30 PROCEDURE — 37232 PR REVASCULARIZE TIBIAL/PERON ARTERY,ANGIOPLASTY EA ADD: CPT | Mod: LT,,, | Performed by: SURGERY

## 2019-04-30 PROCEDURE — 99152 MOD SED SAME PHYS/QHP 5/>YRS: CPT | Mod: ,,, | Performed by: SURGERY

## 2019-04-30 PROCEDURE — 25000003 PHARM REV CODE 250: Performed by: SURGERY

## 2019-04-30 PROCEDURE — 75625 CONTRAST EXAM ABDOMINL AORTA: CPT | Mod: 26,,, | Performed by: SURGERY

## 2019-04-30 PROCEDURE — 37228 PR TIB/PER REVASC W/TLA: ICD-10-PCS | Mod: LT,,, | Performed by: SURGERY

## 2019-04-30 PROCEDURE — 75710 PR  ANGIO EXTREMITY UNILAT: ICD-10-PCS | Mod: 26,59,, | Performed by: SURGERY

## 2019-04-30 PROCEDURE — 63600175 PHARM REV CODE 636 W HCPCS: Performed by: SURGERY

## 2019-04-30 PROCEDURE — 37228 PR TIB/PER REVASC W/TLA: CPT | Mod: LT,,, | Performed by: SURGERY

## 2019-04-30 PROCEDURE — 99152 PR MOD CONSCIOUS SEDATION, SAME PHYS, 5+ YRS, FIRST 15 MIN: ICD-10-PCS | Mod: ,,, | Performed by: SURGERY

## 2019-04-30 PROCEDURE — 75625 PR  ANGIO AORTOGRAM ABD SERIAL: ICD-10-PCS | Mod: 26,,, | Performed by: SURGERY

## 2019-04-30 PROCEDURE — 25000003 PHARM REV CODE 250: Performed by: ANESTHESIOLOGY

## 2019-04-30 PROCEDURE — S0077 INJECTION, CLINDAMYCIN PHOSP: HCPCS | Performed by: SURGERY

## 2019-04-30 PROCEDURE — 37232 PR REVASCULARIZE TIBIAL/PERON ARTERY,ANGIOPLASTY EA ADD: ICD-10-PCS | Mod: LT,,, | Performed by: SURGERY

## 2019-04-30 PROCEDURE — 75710 ARTERY X-RAYS ARM/LEG: CPT | Mod: 26,59,, | Performed by: SURGERY

## 2019-04-30 RX ORDER — LIDOCAINE HYDROCHLORIDE 10 MG/ML
0.2 INJECTION INFILTRATION; PERINEURAL ONCE
Status: COMPLETED | OUTPATIENT
Start: 2019-04-30 | End: 2019-04-30

## 2019-04-30 RX ORDER — FENTANYL CITRATE 50 UG/ML
INJECTION, SOLUTION INTRAMUSCULAR; INTRAVENOUS CODE/TRAUMA/SEDATION MEDICATION
Status: COMPLETED | OUTPATIENT
Start: 2019-04-30 | End: 2019-04-30

## 2019-04-30 RX ORDER — CLINDAMYCIN PHOSPHATE 900 MG/50ML
900 INJECTION, SOLUTION INTRAVENOUS ONCE
Status: COMPLETED | OUTPATIENT
Start: 2019-04-30 | End: 2019-04-30

## 2019-04-30 RX ORDER — OXYCODONE AND ACETAMINOPHEN 5; 325 MG/1; MG/1
1 TABLET ORAL EVERY 6 HOURS PRN
Qty: 30 TABLET | Refills: 0 | Status: SHIPPED | OUTPATIENT
Start: 2019-04-30 | End: 2019-05-14

## 2019-04-30 RX ORDER — MIDAZOLAM HYDROCHLORIDE 1 MG/ML
INJECTION INTRAMUSCULAR; INTRAVENOUS CODE/TRAUMA/SEDATION MEDICATION
Status: COMPLETED | OUTPATIENT
Start: 2019-04-30 | End: 2019-04-30

## 2019-04-30 RX ORDER — SODIUM CHLORIDE 9 MG/ML
INJECTION, SOLUTION INTRAVENOUS CONTINUOUS
Status: DISCONTINUED | OUTPATIENT
Start: 2019-04-30 | End: 2019-04-30 | Stop reason: HOSPADM

## 2019-04-30 RX ORDER — HEPARIN SODIUM 1000 [USP'U]/ML
INJECTION, SOLUTION INTRAVENOUS; SUBCUTANEOUS CODE/TRAUMA/SEDATION MEDICATION
Status: COMPLETED | OUTPATIENT
Start: 2019-04-30 | End: 2019-04-30

## 2019-04-30 RX ADMIN — MIDAZOLAM HYDROCHLORIDE 1 MG: 1 INJECTION, SOLUTION INTRAMUSCULAR; INTRAVENOUS at 09:04

## 2019-04-30 RX ADMIN — SODIUM CHLORIDE: 0.9 INJECTION, SOLUTION INTRAVENOUS at 01:04

## 2019-04-30 RX ADMIN — CLINDAMYCIN IN 5 PERCENT DEXTROSE 900 MG: 18 INJECTION, SOLUTION INTRAVENOUS at 09:04

## 2019-04-30 RX ADMIN — FENTANYL CITRATE 25 MCG: 50 INJECTION INTRAMUSCULAR; INTRAVENOUS at 10:04

## 2019-04-30 RX ADMIN — LIDOCAINE HYDROCHLORIDE 0.2 ML: 10 INJECTION, SOLUTION EPIDURAL; INFILTRATION; INTRACAUDAL at 08:04

## 2019-04-30 RX ADMIN — HEPARIN SODIUM 1000 UNITS: 1000 INJECTION, SOLUTION INTRAVENOUS; SUBCUTANEOUS at 11:04

## 2019-04-30 RX ADMIN — MIDAZOLAM HYDROCHLORIDE 0.5 MG: 1 INJECTION, SOLUTION INTRAMUSCULAR; INTRAVENOUS at 11:04

## 2019-04-30 RX ADMIN — FENTANYL CITRATE 50 MCG: 50 INJECTION INTRAMUSCULAR; INTRAVENOUS at 09:04

## 2019-04-30 RX ADMIN — FENTANYL CITRATE 25 MCG: 50 INJECTION INTRAMUSCULAR; INTRAVENOUS at 11:04

## 2019-04-30 RX ADMIN — HEPARIN SODIUM 2000 UNITS: 1000 INJECTION, SOLUTION INTRAVENOUS; SUBCUTANEOUS at 09:04

## 2019-04-30 RX ADMIN — HEPARIN SODIUM 8000 UNITS: 1000 INJECTION, SOLUTION INTRAVENOUS; SUBCUTANEOUS at 10:04

## 2019-04-30 RX ADMIN — HEPARIN SODIUM 2000 UNITS: 1000 INJECTION, SOLUTION INTRAVENOUS; SUBCUTANEOUS at 11:04

## 2019-04-30 RX ADMIN — IOHEXOL 80 ML: 300 INJECTION, SOLUTION INTRAVENOUS at 12:04

## 2019-04-30 RX ADMIN — MIDAZOLAM HYDROCHLORIDE 0.5 MG: 1 INJECTION, SOLUTION INTRAMUSCULAR; INTRAVENOUS at 10:04

## 2019-04-30 NOTE — SEDATION DOCUMENTATION
Pt tolerated procedure well. VSS. No s/s of distress noted. Report given to Karen WALSH. RN will transport pt.

## 2019-04-30 NOTE — DISCHARGE INSTRUCTIONS
ANGIOGRAM INSTRUCTIONS                                           Drink plenty of fluids for the next 48 hours and follow your doctor's diet orders.    Rest for the next 72 hours.   Try not to keep the injected leg bent for a long period of time.  Remove the dressing in 24 hours, and you may shower. Clean the area with soap and water, and apply a band aid for the next 5 days.                                                                 No  Lifting over 5-10 lbs., that is, not more than 1 gallon of water, or straining for 72 hours.    No driving, no drinking alcohol, and no signing legal documents for the next 24 hours.    Call your doctor for elevated temperature, shortness of breath, chest pain, or cold discolored foot or leg.    If oozing occurs at the injections site, lie down.  Apply pressure with a clean wash cloth for 20 to 30 minutes and call your doctor.    If severe bleeding occurs, lie down, apply pressure.  Call 911 and request an ambulance to take you to the nearest hospital emergency room.    Continue to take your regular medications as instructed.      Follow the instructions in the handout given to you.      Vascular Closure Device MYNX    -Re apply a clean, dry band aid and every day for five days or until a scab has formed at the site.  Change the band aid as needed  -Keep the site dry and clean.  -You may shower 24 hours after the procedure, but do not bathe or use a pool until the wound had completely closed.  -Gently clean your puncture site with soap and warm water.  -After showering , gently pat-dry the site with a clean towel; then let the site air dry before covering with a band aid  -Limit tight fitting clothes or underwear that my irritate the puncture site until the site has healed.    Daily Activities    Do not drive, drink alcohol, or sign legal documents for 24 hours, or if taking narcotic pain medication.    Day of discharge  -No driving  Modify activity for 3-5 days  -No heavy  lifting of anything over 5 pounds  (equivalent to a 1/2 gallon of milk)  -No pushing or pulling.  -No vigorous activity or straining  -Avoid stairs unless necessary : if necessary, take them slowly.  -Coughing, sneezing, or straining for a bowel movement:  Support your groin by pressing with your palm on top of the dressing/bandage.  -Sexual activity : check with your doctor  -No strenuous exercise.  -Avoid driving unless necessary.  Talk to your doctor about returning back to work, which depends on your type of work., your procedure and any medication you might be taking.        Fall Prevention  Millions of people fall every year and injure themselves. You may have had anesthesia or sedation which may increase your risk of falling. You may have health issues that put you at an increased risk of falling.     Here are ways to reduce your risk of falling.  ·   · Make your home safe by keeping walkways clear of objects you may trip over.  · Use non-slip pads under rugs. Do not use area rugs or small throw rugs.  · Use non-slip mats in bathtubs and showers.  · Install handrails and lights on staircases.  · Do not walk in poorly lit areas.  · Do not stand on chairs or wobbly ladders.  · Use caution when reaching overhead or looking upward. This position can cause a loss of balance.  · Be sure your shoes fit properly, have non-slip bottoms and are in good condition.   · Wear shoes both inside and out. Avoid going barefoot or wearing slippers.  · Be cautious when going up and down stairs, curbs, and when walking on uneven sidewalks.  · If your balance is poor, consider using a cane or walker.  · If your fall was related to alcohol use, stop or limit alcohol intake.   · If your fall was related to use of sleeping medicines, talk to your doctor about this. You may need to reduce your dosage at bedtime if you awaken during the night to go to the bathroom.    · To reduce the need for nighttime bathroom trips:  ¨ Avoid drinking  fluids for several hours before going to bed  ¨ Empty your bladder before going to bed  ¨ Men can keep a urinal at the bedside  · Stay as active as you can. Balance, flexibility, strength, and endurance all come from exercise. They all play a role in preventing falls. Ask your healthcare provider which types of activity are right for you.  · Get your vision checked on a regular basis.  · If you have pets, know where they are before you stand up or walk so you don't trip over them.  · Use night lights.

## 2019-04-30 NOTE — H&P
HPI:   Marvin Ray is a 59 y.o. male with       Patient Active Problem List   Diagnosis    Epiretinal membrane, both eyes    Nuclear sclerotic cataract of right eye    Pseudophakia, left eye    Ptosis, left eyelid    DM type 2 with diabetic peripheral neuropathy    HLD (hyperlipidemia)    Neovascular glaucoma of both eyes    Tophaceous gout    Essential hypertension    CAD (coronary artery disease)    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 edema, with long-term current use of insulin    Neovascular glaucoma of left eye, severe stage    Statin myopathy    Neovascular glaucoma, right eye, mild stage    Left leg cellulitis    PVD (peripheral vascular disease)    Right wrist pain    Multiple open wounds of lower leg    Hepatosplenomegaly    Non-pressure chronic ulcer of other part of left foot with fat layer exposed    Type 2 diabetes mellitus with left diabetic foot ulcer    Open wound of left foot    Cellulitis of left lower extremity    Diabetic foot infection    Other ascites    Severe malnutrition    Goals of care, counseling/discussion    Alteration in skin integrity    MDR Acinetobacter baumannii infection    Takes dietary supplements    Intertriginous dermatitis associated with moisture    Debility    Infection due to multidrug-resistant Pseudomonas aeruginosa    Subacute osteomyelitis of left foot    Ascites    Acute on chronic combined systolic and diastolic congestive heart failure    Stage 3 chronic kidney disease   being managed by PCP and specialists who is here today for evaluation of L foot wound. Seen in hospital last mo with LLE cellulitis. Treated with Abx. Also had paracentesis for ascites with negative w/u per family. Patient states location is L foot occurring for 1-2 mo, worsening foot wound although improvement in edema and erythema. Associated signs and symptoms include pain and edema.  Quality is aching and severity is 5/10. Symptoms began 10/2018 spontaneously with blistering and severe edema. Alleviating factors include wound care and elevation. Worsening factors include pressure.   Tobacco use: denies   1/4/19: s/p LLE angioplasty and multiple subsequent OR and bedside debridements. On IV Abx per culture results. Glucose better controlled. No fevers. Receiving local wound care with Santyl.   1/14/19: Cont to receive local wound care. Pseudomonas in tissue and bone cultures multidrug resistant other than aminoglycoside; d/w Dr. Velez with high risk of ESRD with 6 weeks of aminoglycoside treatment. No F/C.   1/31/19: Pt without complaints. Was started back on Bactrim. Family doing wound care.   2/28/19: S/p L foot debridement 2/13/19. Started on Meropenem.   3/28/19: No complaints. S/p paracentesis and pt feels better.     Interval history: No new issues, cont local wound care, denies F/C.          Past Medical History:   Diagnosis Date    Arthritis     Cellulitis     CKD (chronic kidney disease), stage III     Coronary artery disease     Diabetes mellitus     Diabetic retinopathy     Diabetic ulcer of left foot     Glaucoma     Gout     Hyperlipemia     Hypertension     ICD (implantable cardioverter-defibrillator) in place 11/02/2018    Left chest    Non-pressure chronic ulcer of other part of left foot with fat layer exposed 10/23/2018    PVD (peripheral vascular disease)     Type 2 diabetes mellitus with left diabetic foot ulcer 10/29/2018    Unsteady gait     uses a wheelchair           Past Surgical History:   Procedure Laterality Date    AHMED GLAUCOMA IMPLANT Left 2011    DONE AT [facility]    AMPUTATION, TOE Left 12/5/2018    Performed by [facility] Chan [facility] at [facility] OR    AMPUTATION, TOES 2-5 Left 11/16/2018    Performed by [facility] at [facility] OR    BAERVELDT GLAUCOMA IMPLANT Left 2012    WITH CATARACT EXTRACTION//DONE AT [facility]    CARDIAC  CATHETERIZATION Left 05/2016    CARDIAC DEFIBRILLATOR PLACEMENT Left 11/02/2018    CATARACT EXTRACTION W/ INTRAOCULAR LENS IMPLANT Left 2012    WITH BAERVEDT//DONE AT [facility]    CATARACT EXTRACTION W/ INTRAOCULAR LENS IMPLANT Right 09/26/2018    COMPLEX ()    CB DESTRUCTION WITH CYCLO G6 Left 02/15/2017        CYST REMOVAL      DEBRIDEMENT, FOOT  12/28/2018    Performed by [facility] at [facility] OR    Exam under anesthesia, left foot debridement, left foot washout, possible left second through fifth metatarsal resection Left 12/28/2018    Performed by Mitch Chan MD at [facility] OR    Exam under anesthesia, left foot debridement, washout and all other indicated procedures Left 12/5/2018    Performed by Mitch Chan MD at [facility] OR    EXCISION, LESION, METATARSAL BONE  12/28/2018    Performed by Mitch Chan MD at [facility] OR    HEART CATH-LEFT N/A 5/6/2016    Performed by [facility]ana MD at [facility] CATH LAB    INCISION AND DRAINAGE Left 11/16/2018    Performed by Mitch Chan MD at [facility] OR    Incision and Drainage, left lower extremity debridement, washout Left 11/14/2018    Performed by Mitch Chan MD at [facility] OR    INSERTION, ICD GENERATOR, SINGLE CHAMBER N/A 11/2/2018    Performed by Pernell Greer MD at [facility] CATH LAB    INSERTION, IOL PROSTHESIS Right 9/26/2018    Performed by Perla Cortés MD at [facility] OR 1ST FLR    LEFT FOOT WOUND DEBRIDEMENT, WASHOUT AND ALL OTHER INDICATED PROCEDURES Left 2/13/2019    Performed by Mitch Chan MD at [facility] OR    PARACENTESIS, ABDOMINAL, INITIAL N/A 3/25/2019    Performed by [facility] at [facility] OR    PARACENTESIS, ABDOMINAL, INITIAL N/A 3/1/2019    Performed by [facility] Provider at [facility] OR    PARACENTESIS, ABDOMINAL, REPEAT N/A 2/11/2019    Performed by [facility] at [facility] OR    PHACOEMULSIFICATION, CATARACT Right 9/26/2018    Performed by  Perla Cortés MD at [facility] OR [address] [place] [address]    Right foot surgery  10/2014    TOE AMPUTATION Right     first and second    TONSILLECTOMY      TRABECULECTOMY/G 6 LASER Left 2/15/2017    Performed by Perla Cortés MD at [facility] OR [address] [place] [address]           Family History   Problem Relation Age of Onset    Diabetes Mother     Heart disease Brother     No Known Problems Father     No Known Problems Sister     No Known Problems Maternal Aunt     No Known Problems Maternal Uncle     No Known Problems Paternal Aunt     No Known Problems Paternal Uncle     No Known Problems Maternal Grandmother     No Known Problems Maternal Grandfather     No Known Problems Paternal Grandmother     No Known Problems Paternal Grandfather     Anemia Neg Hx     Arrhythmia Neg Hx     Asthma Neg Hx     Clotting disorder Neg Hx     Fainting Neg Hx     Heart attack Neg Hx     Heart failure Neg Hx     Hyperlipidemia Neg Hx     Hypertension Neg Hx     Stroke Neg Hx     Atrial Septal Defect Neg Hx     Amblyopia Neg Hx     Blindness Neg Hx     Glaucoma Neg Hx     Macular degeneration Neg Hx     Retinal detachment Neg Hx     Strabismus Neg Hx      Social History           Socioeconomic History    Marital status: Legally      Spouse name: Not on file    Number of children: Not on file    Years of education: 14    Highest education level: Not on file   Occupational History    Not on file   Social Needs    Financial resource strain: Not on file    Food insecurity:     Worry: Not on file     Inability: Not on file    Transportation needs:     Medical: Not on file     Non-medical: Not on file   Tobacco Use    Smoking status: Former Smoker     Packs/day: 1.00     Years: 3.00     Pack years: 3.00     Types: Cigarettes     Last attempt to quit: 1984     Years since quittin.7    Smokeless tobacco: Never Used   Substance and Sexual Activity    Alcohol use: Yes     Alcohol/week: 0.6 oz     Types: 1 Cans  of beer per week     Comment: Occasional    Drug use: No    Sexual activity: Not Currently   Lifestyle    Physical activity:     Days per week: Not on file     Minutes per session: Not on file    Stress: Not on file   Relationships    Social connections:     Talks on phone: Not on file     Gets together: Not on file     Attends Mu-ism service: Not on file     Active member of club or organization: Not on file     Attends meetings of clubs or organizations: Not on file     Relationship status: Not on file    Intimate partner violence:     Fear of current or ex partner: Not on file     Emotionally abused: Not on file     Physically abused: Not on file     Forced sexual activity: Not on file   Other Topics Concern    Not on file   Social History Narrative    Not on file       Current Outpatient Medications:    allopurinol (ZYLOPRIM) 300 MG tablet, Take 1 tablet (300 mg total) by mouth once daily., Disp: 30 tablet, Rfl: 3    aspirin 325 MG tablet, Take 325 mg by mouth once daily. , Disp: , Rfl:    bisacodyl (DULCOLAX) 5 mg EC tablet, Take 5 mg by mouth daily as needed for Constipation., Disp: , Rfl:    brimonidine 0.2% (ALPHAGAN) 0.2 % Drop, Place 1 drop into both eyes 2 (two) times daily., Disp: 10 mL, Rfl: 11    carvedilol (COREG) 3.125 MG tablet, Take 1 tablet (3.125 mg total) by mouth 2 (two) times daily., Disp: 60 tablet, Rfl: 11    coenzyme Q10 100 mg capsule, Take 100 mg by mouth every morning., Disp: , Rfl:    dextrose 5 % SolP 100 mL with ceftolozane-tazobactam 1.5 gram SolR 3,000 mg injection, Inject 3,000 mg into the vein every 8 (eight) hours. for 21 days, Disp: , Rfl:    dorzolamide-timolol 2-0.5% (COSOPT) 22.3-6.8 mg/mL ophthalmic solution, Place 1 drop into both eyes 2 (two) times daily., Disp: 1 Bottle, Rfl: 11    ezetimibe (ZETIA) 10 mg tablet, TAKE 1 TABLET(10 MG) BY MOUTH EVERY DAY, Disp: 30 tablet, Rfl: 0    fish oil-omega-3 fatty acids 300-1,000 mg capsule, Take 1 capsule by  "mouth 2 (two) times daily., Disp: 60 capsule, Rfl: 3    insulin aspart U-100 (NOVOLOG) 100 unit/mL InPn pen, Use on premeal readings: 150-200=+2, 201-250=+4; 251-300=+6; 301-350=+8, over 350=+10 units, Disp: 2.7 mL, Rfl: 11    losartan (COZAAR) 25 MG tablet, Take 1 tablet (25 mg total) by mouth once daily., Disp: 90 tablet, Rfl: 3    metOLazone (ZAROXOLYN) 5 MG tablet, Take 1 tablet (5 mg total) by mouth every Monday and Thursday. 30 minutes before lasix., Disp: 30 tablet, Rfl: 2    MULTIVIT,THER IRON,CA,FA & MIN (MULTIVITAMIN) Tab, Take 1 tablet by mouth every morning. , Disp: , Rfl:    oxyCODONE-acetaminophen (PERCOCET) 5-325 mg per tablet, Take 1 tablet by mouth every 6 (six) hours as needed for Pain., Disp: 45 tablet, Rfl: 0    pen needle, diabetic 31 gauge x 1/4" Ndle, Use as directed, Disp: 100 each, Rfl: 11    carvedilol (COREG) 6.25 MG tablet, , Disp: , Rfl: 11    insulin degludec-liraglutide (XULTOPHY 100/3.6) 100 unit-3.6 mg /mL (3 mL) InPn, Inject 42 Units into the skin once daily., Disp: 5 Syringe, Rfl: 5    insulin glargine-lixisenatide (SOLIQUA 100/33) 100 unit-33 mcg/mL InPn, Inject 34 Units into the skin once daily., Disp: , Rfl:    miconazole NITRATE 2 % (MICOTIN) 2 % top powder, Apply topically 2 (two) times daily., Disp: , Rfl: 0    SANTYL ointment, [place] TOPICALLY ONCE D, Disp: , Rfl: 0    torsemide (DEMADEX) 20 MG Tab, Take 4 tablets (80 mg total) by mouth 2 (two) times daily., Disp: 120 tablet, Rfl: 0   No current facility-administered medications for this visit.   Facility-Administered Medications Ordered in Other Visits:    lactated ringers infusion, , Intravenous, Continuous, Tam Reynolds MD    lidocaine (PF) 10 mg/ml (1%) injection 10 mg, 1 mL, Intradermal, Once, Tam Reynolds MD   REVIEW OF SYSTEMS:   General: No fevers or chills; ENT: No sore throat; Allergy and Immunology: no persistent infections; Hematological and Lymphatic: No history of bleeding or easy bruising; " Endocrine: negative; Respiratory: no cough, shortness of breath, or wheezing; Cardiovascular: no chest pain or dyspnea on exertion; Gastrointestinal: no abdominal pain/back, change in bowel habits, or bloody stools; Genito-Urinary: no dysuria, trouble voiding, or hematuria; Musculoskeletal: negative; Neurological: no TIA or stroke symptoms; Psychiatric: no nervousness, anxiety or depression.   PHYSICAL EXAM:     Pulse: (!) 59     General appearance: Alert, well-appearing, and in no distress. Oriented to person, place, and time   Neurological: Normal speech, no focal findings noted; CN II - XII grossly intact. RLE with sensation to light touch, LLE with sensation to light touch.   Musculoskeletal: Digits/nail without cyanosis/clubbing. Strength 5/5 BLE.   Neck: Supple, no significant adenopathy   Chest: Clear to auscultation, no wheezes, rales or rhonchi, symmetric air entry. No use of accessory muscles   Cardiac: Normal rate and regular rhythm, S1 and S2 normal   Abdomen: Soft, nontender, nondistended, no masses or organomegaly, no hernia   No rebound tenderness noted; bowel sounds normal   No groin adenopathy   Extremities: 2+ R femoral pulse, 2+ L femoral pulse   doppler+ R popliteal pulse, doppler+ L popliteal pulse   doppler+ R PT pulse, doppler+ L PT pulse   doppler+ R DP pulse, doppler+ L DP pulse   1+ RLE edema, 2+ LLE edema   Skin: LLE with blistering superficial anterior lower leg wound, minimal brawny edema, +large foot wound with minimal fibrinous distally and improved granulation tissue over entire wound, no odor, no purulence or erythema     LAB RESULTS:   No results found for: CBC         Lab Results   Component Value Date    LABPROT 10.7 03/10/2019    INR 1.0 03/10/2019           Lab Results   Component Value Date     (L) 03/25/2019    K 3.9 03/25/2019    CL 95 03/25/2019    CO2 30 (H) 03/25/2019    GLU 67 (L) 03/25/2019     (H) 03/25/2019    CREATININE 2.2 (H) 03/25/2019    CALCIUM 9.0  03/25/2019    ANIONGAP 9 03/25/2019    EGFRNONAA 31 (A) 03/25/2019           Lab Results   Component Value Date    WBC 5.96 03/25/2019    RBC 3.28 (L) 03/25/2019    HGB 8.6 (L) 03/25/2019    HCT 27.3 (L) 03/25/2019    MCV 83 03/25/2019    MCH 26.2 (L) 03/25/2019    MCHC 31.5 (L) 03/25/2019    RDW 20.7 (H) 03/25/2019     03/25/2019    MPV 9.2 03/25/2019    GRAN 4.4 03/25/2019    GRAN 74.4 (H) 03/25/2019    LYMPH 1.0 03/25/2019    LYMPH 16.3 (L) 03/25/2019    MONO 0.3 03/25/2019    MONO 4.4 03/25/2019    EOS 0.3 03/25/2019    BASO 0.04 03/25/2019    EOSINOPHIL 4.2 03/25/2019    BASOPHIL 0.7 03/25/2019    DIFFMETHOD Automated 03/25/2019     .         Lab Results   Component Value Date    HGBA1C 6.7 (H) 03/11/2019     IMAGING:   All pertinent imaging has been reviewed and interpreted independently.   BLE arterial US 1/2019: No focal stenosis    IMP/PLAN:   59 y.o. male with       Patient Active Problem List   Diagnosis    Epiretinal membrane, both eyes    Nuclear sclerotic cataract of right eye    Pseudophakia, left eye    Ptosis, left eyelid    DM type 2 with diabetic peripheral neuropathy    HLD (hyperlipidemia)    Neovascular glaucoma of both eyes    Tophaceous gout    Essential hypertension    CAD (coronary artery disease)    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 edema, with long-term current use of insulin    Neovascular glaucoma of left eye, severe stage    Statin myopathy    Neovascular glaucoma, right eye, mild stage    Left leg cellulitis    PVD (peripheral vascular disease)    Right wrist pain    Multiple open wounds of lower leg    Hepatosplenomegaly    Non-pressure chronic ulcer of other part of left foot with fat layer exposed    Type 2 diabetes mellitus with left diabetic foot ulcer    Open wound of left foot    Cellulitis of left lower extremity    Diabetic foot infection    Other ascites    Severe  malnutrition    Goals of care, counseling/discussion    Alteration in skin integrity    MDR Acinetobacter baumannii infection    Takes dietary supplements    Intertriginous dermatitis associated with moisture    Debility    Infection due to multidrug-resistant Pseudomonas aeruginosa    Subacute osteomyelitis of left foot    Ascites    Acute on chronic combined systolic and diastolic congestive heart failure    Stage 3 chronic kidney disease   being managed by PCP and specialists who is here today for evaluation of L foot wound.     -L foot wound improved s/p debridement 2/13/19, multifactorial due to diabetes, PVD and venous insufficiency with improvement in granulation tissue on Abx due to multidrug resistent bacteria in the past - have d/w pt and family re: risk of bacteremia and progression of infection despite improvement in perfusion and wound bed; amputation options are limited due to venous stasis changes to below left knee leg - Pt states he desires to continue Abx and local wound care; does not desire amputation at this time  -Rec LLE angiogram with possible intervention with CO2 today for limb salvage   -Rec Wound care center referral - he would benefit from continued aggressive wound care   -Cont ID rec Abx regimen   -Recommend daily Santyl to left foot wounds with cast padding and dry Kerlix overlying   -Rec BLE Xeroform and dry kerlix wraps twice daily to shin regions with elevation of LLE above level of the heart   -Strict glycemic control   -Cont Abx per ID recs

## 2019-04-30 NOTE — BRIEF OP NOTE
[facility]  Brief Operative Note     SUMMARY     Surgery Date: 4/30/2019     Surgeon(s) and Role:     * Mitch Chan MD - Primary    Assisting Surgeon: None    Pre-op Diagnosis:  Atherosclerosis of native artery of left lower extremity with ulceration of midfoot [I70.244]    Post-op Diagnosis:  Post-Op Diagnosis Codes:     * Atherosclerosis of native artery of left lower extremity with ulceration of midfoot [I70.244]    Procedure:  1. US guided R CFA access  2. Aortogram with CO2  3. L femoral angiogram with CO2  4. L popliteal angiogram  5. L tibial angiogram   6. L peroneal angioplasty with a [id number] [place] balloon   7. L peroneal angioplasty with a [id number] [place] balloon  8. L AT angioplasty with a [id number] [place] balloon  9. L AT angioplasty with a 2.5x100 [place] balloon   10. 6fr Mynx closure R CFA  11. Moderate sedation    Anesthesia: RN IV Sedation    Description of the findings of the procedure: adequate artery for access    Findings/Key Components: 2+ R femoral at conclusion of case, markedly improved flow into L foot after angioplasty    Estimated Blood Loss: <5cc         Specimens:   Specimen (12h ago, onward)    None          Discharge Note    SUMMARY     Admit Date: 4/30/2019    Discharge Date and Time:  04/30/2019 12:25 PM    Hospital Course (synopsis of major diagnoses, care, treatment, and services provided during the course of the hospital stay): No new issues or acute events postoperatively.      Final Diagnosis: Post-Op Diagnosis Codes:     * Atherosclerosis of native artery of left lower extremity with ulceration of midfoot [I70.244]    Disposition: Home or Self Care    Follow Up/Patient Instructions: Resume diabetic diet, follow-up in 2-3 weeks, resume regular activity in 2-3 days.    Resume medications per post-procedure med reconciliation.    Medications:  Reconciled Home Medications:      Medication List      ASK your doctor about these medications    allopurinol  "300 MG tablet  Commonly known as:  ZYLOPRIM  Take 1 tablet (300 mg total) by mouth once daily.     aspirin 325 MG tablet  Take 325 mg by mouth once daily.     bisacodyl 5 mg EC tablet  Commonly known as:  DULCOLAX  Take 5 mg by mouth daily as needed for Constipation.     brimonidine 0.2% 0.2 % Drop  Commonly known as:  ALPHAGAN  Place 1 drop into both eyes 2 (two) times daily.     * carvedilol 3.125 MG tablet  Commonly known as:  COREG  Take 1 tablet (3.125 mg total) by mouth 2 (two) times daily.     * carvedilol 6.25 MG tablet  Commonly known as:  COREG     coenzyme Q10 100 mg capsule  Take 100 mg by mouth every morning.     dorzolamide-timolol 2-0.5% 22.3-6.8 mg/mL ophthalmic solution  Commonly known as:  COSOPT  Place 1 drop into both eyes 2 (two) times daily.     ezetimibe 10 mg tablet  Commonly known as:  ZETIA  TAKE 1 TABLET(10 MG) BY MOUTH EVERY DAY     fish oil-omega-3 fatty acids 300-1,000 mg capsule  Take 1 capsule by mouth 2 (two) times daily.     furosemide 20 MG tablet  Commonly known as:  LASIX     insulin aspart U-100 100 unit/mL (3 mL) Inpn pen  Commonly known as:  NovoLOG  Use on premeal readings: 150-200=+2, 201-250=+4; 251-300=+6; 301-350=+8, over 350=+10 units     insulin degludec-liraglutide 100 unit-3.6 mg /mL (3 mL) Inpn  Commonly known as:  XULTOPHY 100/3.6  Inject 42 Units into the skin once daily.     losartan 25 MG tablet  Commonly known as:  COZAAR  Take 1 tablet (25 mg total) by mouth once daily.     metOLazone 5 MG tablet  Commonly known as:  ZAROXOLYN  Take 1 tablet (5 mg total) by mouth every Monday and Thursday. 30 minutes before lasix.     miconazole NITRATE 2 % 2 % top powder  Commonly known as:  MICOTIN  Apply topically 2 (two) times daily.     multivitamin Tab  Take 1 tablet by mouth every morning.     oxyCODONE-acetaminophen 5-325 mg per tablet  Commonly known as:  PERCOCET  Take 1 tablet by mouth every 6 (six) hours as needed for Pain.     pen needle, diabetic 31 gauge x 1/4" " Ndle  Use as directed     SANTYL ointment  Generic drug:  collagenase  FEDERICO TOPICALLY ONCE D     SOLIQUA 100/33 100 unit-33 mcg/mL Inpn  Generic drug:  insulin glargine-lixisenatide  Inject 34 Units into the skin once daily.     torsemide 20 MG Tab  Commonly known as:  DEMADEX  Take 4 tablets (80 mg total) by mouth 2 (two) times daily.         * This list has 2 medication(s) that are the same as other medications prescribed for you. Read the directions carefully, and ask your doctor or other care provider to review them with you.              No discharge procedures on file.

## 2019-05-01 ENCOUNTER — OFFICE VISIT (OUTPATIENT)
Dept: NEPHROLOGY | Facility: CLINIC | Age: 59
End: 2019-05-01
Payer: COMMERCIAL

## 2019-05-01 ENCOUNTER — LAB VISIT (OUTPATIENT)
Dept: LAB | Facility: HOSPITAL | Age: 59
End: 2019-05-01
Attending: INTERNAL MEDICINE
Payer: COMMERCIAL

## 2019-05-01 VITALS
OXYGEN SATURATION: 98 % | WEIGHT: 177 LBS | SYSTOLIC BLOOD PRESSURE: 80 MMHG | BODY MASS INDEX: 25.34 KG/M2 | DIASTOLIC BLOOD PRESSURE: 60 MMHG | HEIGHT: 70 IN | HEART RATE: 83 BPM

## 2019-05-01 DIAGNOSIS — N18.30 STAGE 3 CHRONIC KIDNEY DISEASE: Primary | ICD-10-CM

## 2019-05-01 DIAGNOSIS — D64.89 ANEMIA DUE TO MULTIPLE MECHANISMS: ICD-10-CM

## 2019-05-01 DIAGNOSIS — R80.1 PERSISTENT PROTEINURIA: ICD-10-CM

## 2019-05-01 DIAGNOSIS — R79.89 AZOTEMIA: ICD-10-CM

## 2019-05-01 DIAGNOSIS — N18.30 STAGE 3 CHRONIC KIDNEY DISEASE: ICD-10-CM

## 2019-05-01 DIAGNOSIS — N18.30 CKD STAGE 3 DUE TO TYPE 2 DIABETES MELLITUS: ICD-10-CM

## 2019-05-01 DIAGNOSIS — I73.9 PVD (PERIPHERAL VASCULAR DISEASE): Chronic | ICD-10-CM

## 2019-05-01 DIAGNOSIS — L97.529 TYPE 2 DIABETES MELLITUS WITH LEFT DIABETIC FOOT ULCER: ICD-10-CM

## 2019-05-01 DIAGNOSIS — E87.1 HYPONATREMIA: ICD-10-CM

## 2019-05-01 DIAGNOSIS — E11.621 TYPE 2 DIABETES MELLITUS WITH LEFT DIABETIC FOOT ULCER: ICD-10-CM

## 2019-05-01 DIAGNOSIS — E11.22 CKD STAGE 3 DUE TO TYPE 2 DIABETES MELLITUS: ICD-10-CM

## 2019-05-01 LAB
ALBUMIN SERPL BCP-MCNC: 2.8 G/DL (ref 3.5–5.2)
ANION GAP SERPL CALC-SCNC: 11 MMOL/L (ref 8–16)
BUN SERPL-MCNC: 128 MG/DL (ref 8–23)
CALCIUM SERPL-MCNC: 9.5 MG/DL (ref 8.7–10.5)
CHLORIDE SERPL-SCNC: 98 MMOL/L (ref 95–110)
CO2 SERPL-SCNC: 23 MMOL/L (ref 23–29)
CREAT SERPL-MCNC: 2 MG/DL (ref 0.5–1.4)
EST. GFR  (AFRICAN AMERICAN): 40.4 ML/MIN/1.73 M^2
EST. GFR  (NON AFRICAN AMERICAN): 35 ML/MIN/1.73 M^2
GLUCOSE SERPL-MCNC: 145 MG/DL (ref 70–110)
PHOSPHATE SERPL-MCNC: 5.3 MG/DL (ref 2.7–4.5)
POTASSIUM SERPL-SCNC: 3.8 MMOL/L (ref 3.5–5.1)
SODIUM SERPL-SCNC: 132 MMOL/L (ref 136–145)

## 2019-05-01 PROCEDURE — 99205 OFFICE O/P NEW HI 60 MIN: CPT | Mod: S$GLB,,, | Performed by: INTERNAL MEDICINE

## 2019-05-01 PROCEDURE — 99999 PR PBB SHADOW E&M-EST. PATIENT-LVL IV: CPT | Mod: PBBFAC,,, | Performed by: INTERNAL MEDICINE

## 2019-05-01 PROCEDURE — 36415 COLL VENOUS BLD VENIPUNCTURE: CPT

## 2019-05-01 PROCEDURE — 80069 RENAL FUNCTION PANEL: CPT

## 2019-05-01 PROCEDURE — 3044F HG A1C LEVEL LT 7.0%: CPT | Mod: CPTII,S$GLB,, | Performed by: INTERNAL MEDICINE

## 2019-05-01 PROCEDURE — 3008F BODY MASS INDEX DOCD: CPT | Mod: CPTII,S$GLB,, | Performed by: INTERNAL MEDICINE

## 2019-05-01 PROCEDURE — 3078F DIAST BP <80 MM HG: CPT | Mod: CPTII,S$GLB,, | Performed by: INTERNAL MEDICINE

## 2019-05-01 PROCEDURE — 3074F SYST BP LT 130 MM HG: CPT | Mod: CPTII,S$GLB,, | Performed by: INTERNAL MEDICINE

## 2019-05-01 PROCEDURE — 99205 PR OFFICE/OUTPT VISIT, NEW, LEVL V, 60-74 MIN: ICD-10-PCS | Mod: S$GLB,,, | Performed by: INTERNAL MEDICINE

## 2019-05-01 PROCEDURE — 3008F PR BODY MASS INDEX (BMI) DOCUMENTED: ICD-10-PCS | Mod: CPTII,S$GLB,, | Performed by: INTERNAL MEDICINE

## 2019-05-01 PROCEDURE — 3074F PR MOST RECENT SYSTOLIC BLOOD PRESSURE < 130 MM HG: ICD-10-PCS | Mod: CPTII,S$GLB,, | Performed by: INTERNAL MEDICINE

## 2019-05-01 PROCEDURE — 99999 PR PBB SHADOW E&M-EST. PATIENT-LVL IV: ICD-10-PCS | Mod: PBBFAC,,, | Performed by: INTERNAL MEDICINE

## 2019-05-01 PROCEDURE — 3078F PR MOST RECENT DIASTOLIC BLOOD PRESSURE < 80 MM HG: ICD-10-PCS | Mod: CPTII,S$GLB,, | Performed by: INTERNAL MEDICINE

## 2019-05-01 PROCEDURE — 3044F PR MOST RECENT HEMOGLOBIN A1C LEVEL <7.0%: ICD-10-PCS | Mod: CPTII,S$GLB,, | Performed by: INTERNAL MEDICINE

## 2019-05-01 NOTE — LETTER
May 1, 2019      Dixon West MD  [address]  [facility] [address]           [facility] - Nephrology  [address]  [facility] [address]  Phone: [phone]  Fax: [phone]          Patient: Marvin Ray   MR Number: [id number]   YOB: 1960   Date of Visit: 5/1/2019       Dear Dr. Dixon West:    Thank you for referring Marvin Ray to me for evaluation. Attached you will find relevant portions of my assessment and plan of care.    If you have questions, please do not hesitate to call me. I look forward to following Marvin Ray along with you.    Sincerely,    Kristopher Walker MD    Enclosure  CC:  No Recipients    If you would like to receive this communication electronically, please contact [email or web address] or [phone] to request more information on [employer] Link access.    For providers and/or their staff who would like to refer a patient to [facility], please contact us through our one-stop-shop provider referral line, [facility], at [phone].    If you feel you have received this communication in error or would no longer like to receive these types of communications, please e-mail [email or web address]

## 2019-05-01 NOTE — OP NOTE
DATE OF PROCEDURE:  04/30/2019    SURGEON:  Mitch Chan M.D.    ASSISTANT:  None.    PREOPERATIVE DIAGNOSES:  1.  Left lower extremity atherosclerosis, left foot wound with necrosis of   underlying skin and subcutaneous tissue.  2.  Hypertension.  3.  Hyperlipidemia.  4.  CKD stage III.  5.  Diabetes mellitus.  6.  Liver disease, status post paracentesis.  7.  Status post left open transmetatarsal amputation.    POSTOPERATIVE DIAGNOSES:  1.  Left lower extremity atherosclerosis, left foot wound with necrosis of   underlying skin and subcutaneous tissue.  2.  Hypertension.  3.  Hyperlipidemia.  4.  CKD stage III.  5.  Diabetes mellitus.  6.  Liver disease, status post paracentesis.  7.  Status post left open transmetatarsal amputation.    PROCEDURES PERFORMED:  1.  Ultrasound-guided right common femoral artery percutaneous access.  2.  Selective catheterization of infrarenal aorta.  3.  Aortogram with carbon dioxide.  4.  Selective catheterization of first order tissues, left lower extremity.  5.  Left femoral angiogram with carbon dioxide.  6.  Left lower extremity angiogram.  7.  Selective catheterization of second order arterial system, left lower   extremity.  8.  Selective catheterization of third order arterial system, left lower   extremity.  9.  Balloon angioplasty of left peroneal artery with a 2 x 100 mm [place]   balloon.  10.  Balloon angioplasty of left anterior tibial artery with a 2 x 220 mm   balloon.  11.  Balloon angioplasty of left anterior tibial artery with a 2.5 x 100 mm   [place] balloon.  12.  A 6-[place] Mynx closure, right common femoral artery.  13.  Moderate sedation.    MODERATE SEDATION TIME:  152 minutes.  I was present for the entire procedure   and monitored the patient's hemodynamics during the moderate sedation.  Please   review the nurse's log for medications, route and frequency.    FINDINGS:  Aortogram showed no flow-limiting stenosis of the aorta or iliac   vessels.   Hypogastrics were patent bilaterally as well as renal arteries.  Left   iliac femoral system was without flow-limiting stenosis.  The proximal AT is   with a hemodynamically severe stenosis proximally as well as mid AT   moderate-to-severe stenosis with flow limitation.  The proximal peroneal also   had moderate stenosis with flow limitation.  There is 2-vessel runoff to the   foot, filling the foot.  After angioplasty, there is markedly reduced perfusion   in the left lower extremity with improved perfusion into the foot and TMA site.    INDICATIONS FOR PROCEDURE:  A 59-year-old white male with above listed medical   history, who has been followed in the [facility] after angioplasty   in November 2018 and open left TMA for a left foot wound.  He has undergone   debridements as well as continued local wound care with improvement in his   wound, although there was concern for continued malperfusion for which   noninvasive vascular studies were obtained and it was determined that he would   benefit from a left lower extremity angiogram and possible intervention.  Risks   and benefits were discussed with the patient.  The patient stated understanding   and desired to proceed with surgical intervention including risk of   contrast-induced nephrotoxicity and renal failure.    DESCRIPTION OF PROCEDURE:  The patient was seen preoperatively by Anesthesia   team and was deemed stable for surgery.  He was without complaints.  His vital   signs were stable.  He was brought to the Angio Suite and placed supine on the   procedure table.  Timeout was performed.  He was prepped and draped in sterile   fashion.  Preoperative antibiotics were given by nursing.  Moderate sedation was   initiated by nursing.  Ultrasound was used to access the right common femoral   artery in micropuncture technique.  This was upsized to a 5-Sami sheath and a   J-wire was advanced into the infrarenal aorta followed by Omniflush catheter.     CO2 angiography with aortogram was performed.  Next, a Glidewire was used to   select the left lower iliac artery.  An Omniflush catheter was advanced into the   left external iliac artery and a left femoral angiogram was performed with   carbon dioxide.  Next, a complete left lower extremity angiogram was performed   with minimal contrast to the popliteal and tibial vessels.  After independent   interpretation of the imaging, I decided to intervene.  We then exchanged his   Omni flush catheter with a stiff angled Glidewire and advanced a 6-English x 65   cm destination sheath into the left common femoral artery.  The patient remained   hemodynamically stable and he was systemically heparinized.  We then advanced   the sheath into the mid SFA.  An 0.014 wire was then advanced into the peroneal   artery and a Quick-Cross catheter confirmed to be true lumen.  We then performed   balloon angioplasty of the peroneal followed by AT arteries with the above   listed balloons.  After angioplasty, there was markedly improved flow in the   left lower extremity and to the left foot.  All of the wires and catheters were   removed.  The patient's ACT was checked and dosed appropriately.  No reversal   was necessary due to him being subtherapeutic at the conclusion of the   procedure.  A 6-English Mynx was used to close the right common femoral artery   puncture site.  Manual pressure was held for 10 minutes with no hematoma or   bleeding.  A sterile dressing was applied in the room and he was subsequently   transferred back to the stretcher and to the Same-Day Surgery in stable   condition.    ESTIMATED BLOOD LOSS:  Less than 5 mL.    COMPLICATIONS:  None.    ANESTHESIA:  Moderate sedation.  RN IV sedation.      CCL/IN  dd: 05/01/2019 08:34:12 (CDT)  td: 05/01/2019 11:22:24 (CDT)  Doc ID   #[id number]  Job ID #[id number]    CC:

## 2019-05-01 NOTE — PROGRESS NOTES
"Subjective:   Patient ID: Marvin Ray is a 59 y.o. White male who presents for new evaluation of Chronic Kidney Disease    HPI   was seen in clinic today for new patient evaluation of CKD. He was referred by ID, Dr. West. Since this was his first visit with me I reviewed his prior pertinent chart.    Pt arrived in the clinic accompanied by his family member. He is wheelchair bound and does not have much mobility. He reports having diabetes for almost 30 years, hypertension for several years. He has diabetes with complications like retinopathy, neuropathy, severe PVD with multiple toes amputated, non healing leg wound/ ulcers, multiple prior angiography procedures, hyperlipidemia, CAD, cardiomyopathy, AICD placement, EF 20%, CKD, osteomyelitis, prior multiple rounds of different antibiotics, chronic ascites, gout and multiple other medical problems.     He developed LAYNE with severe hyperkalemia in the context of one such hospitalization for non healing leg wound and bactrim use. He was evaluated by local nephrology group on the [facility] in 11/2018 while he was hospitalized there but post hospital discharge he was not followed by nephrology group.    He has had multiple wound infections, has had VRE, MDR pseudomonas infections. Previously he was suspected to have bactrim induced LAYNE.     Overall, serial renal labs noted for multiple episodes of LAYNE. Of note, since about 3/2019 he has disproportionately elevated BUN. Pt denies decreased urine output. Of note, he is severely hypotensive during this visit. His BP is 80 SBP, checked multiple times. He denies any CP/ dizziness. He was advised to go to ER but he became very upset, expressed severe frustration with having to go to ER multiple times and stated he does "not want to die in the hospital".    Of note pt has been on coreg, losartan, lasix and metolazone. He and his family member report his BP always runs low, and he could see SBP in low 90's at " home but he does not have any recent adjustment to his antihypertensive regimen.    On further questioning he admits to prior use of NSAID. His A1C especially in July 2018 was > 14, he has had uncontrolled diabetes per A1C trend.     Renal Function:  Lab Results   Component Value Date     (H) 04/23/2019    GLU 78 03/29/2019     (L) 04/23/2019     03/29/2019    K 3.4 (L) 04/23/2019    K 3.8 03/29/2019    CL 93 (L) 04/23/2019    CL 97 03/29/2019    CO2 26 04/23/2019    CO2 29 03/29/2019     (H) 04/23/2019     (H) 03/29/2019    CALCIUM 9.3 04/23/2019    CALCIUM 8.9 03/29/2019    CREATININE 1.9 (H) 04/23/2019    CREATININE 2.7 (H) 03/29/2019    ALBUMIN 2.0 (L) 03/25/2019    ALBUMIN 2.0 (L) 03/18/2019    PHOS 4.5 04/23/2019    PHOS 4.5 03/12/2019    ESTGFRAFRICA 43 (A) 04/23/2019    ESTGFRAFRICA 28 (A) 03/29/2019    EGFRNONAA 37 (A) 04/23/2019    EGFRNONAA 24 (A) 03/29/2019       Urinalysis:  Lab Results   Component Value Date    APPEARANCEUA Clear 03/11/2019    PHUR 5.0 03/11/2019    SPECGRAV 1.020 03/11/2019    PROTEINUA Negative 03/11/2019    GLUCUA Negative 03/11/2019    OCCULTUA Negative 03/11/2019    NITRITE Negative 03/11/2019    LEUKOCYTESUR Negative 03/11/2019       Protein/Creatinine Ratio:  Lab Results   Component Value Date    PROTEINURINE 20 12/13/2018    CREATRANDUR 22.0 (L) 03/11/2019    UTPCR 0.49 (H) 12/13/2018       CBC:  Lab Results   Component Value Date    WBC 6.77 04/23/2019    HGB 9.1 (L) 04/23/2019    HCT 28.8 (L) 04/23/2019       PTH:  No results found for: PTH    Review of Systems   Constitutional: Positive for activity change and fatigue. Negative for appetite change, chills and fever.   HENT: Negative for hearing loss, nosebleeds and sore throat.    Eyes: Negative for pain and discharge.   Respiratory: Negative for cough, shortness of breath and wheezing.    Cardiovascular: Negative for chest pain and leg swelling.   Gastrointestinal: Negative for abdominal  pain, diarrhea, nausea and vomiting.   Endocrine: Negative for polydipsia and polyuria.   Genitourinary: Negative for dysuria, flank pain, frequency and hematuria.   Musculoskeletal: Positive for arthralgias, back pain and gait problem. Negative for myalgias.   Skin: Positive for wound. Negative for pallor and rash.   Allergic/Immunologic: Negative for environmental allergies.   Neurological: Negative for dizziness, light-headedness and headaches.   Psychiatric/Behavioral: Negative for behavioral problems. The patient is nervous/anxious.        Objective:   Physical Exam   Constitutional: He is oriented to person, place, and time. He appears well-developed. No distress.   HENT:   Head: Normocephalic.   Mouth/Throat: Oropharynx is clear and moist.   Eyes: Right eye exhibits no discharge. Left eye exhibits no discharge.   Neck: Neck supple.   Cardiovascular: Normal rate, regular rhythm and normal heart sounds.   Pulmonary/Chest: Effort normal and breath sounds normal. No respiratory distress. He has no wheezes. He has no rales.   Abdominal: He exhibits distension.   Musculoskeletal: He exhibits no edema.   Left foot covered in dressing    Neurological: He is alert and oriented to person, place, and time.   Skin: Skin is warm and dry. He is not diaphoretic.   Psychiatric:   Upset mood    Vitals reviewed.      Assessment:     1. Stage 3 chronic kidney disease    2. CKD stage 3 due to type 2 diabetes mellitus    3. Azotemia    4. Type 2 diabetes mellitus with left diabetic foot ulcer    5. PVD (peripheral vascular disease)    6. Hyponatremia    7. Anemia due to multiple mechanisms    8. Persistent proteinuria        Plan:       Problem List Items Addressed This Visit        Cardiac/Vascular    PVD (peripheral vascular disease) (Chronic)       Renal/    Stage 3 chronic kidney disease - Primary    Relevant Orders    Renal function panel    Urinalysis    Protein / creatinine ratio, urine    US Retroperitoneal Complete  (Kidney and    Hyponatremia    Persistent proteinuria       Oncology    Anemia due to multiple mechanisms       Endocrine    Type 2 diabetes mellitus with left diabetic foot ulcer    CKD stage 3 due to type 2 diabetes mellitus       Other    Azotemia          CKD III due to diabetes with micro and macrovascular complications, hypertension, atherosclerotic vascular disease, cardiorenal syndrome.   Noted for the past few months he has significantly elevated BUN which is disproportionate to his serum creatinine. At some point may have to consider a 24 hour urine creatinine clearance for accurate assessment of his kidney function.    I reviewed his CKD diagnosis with him and his family member. Pt became extremely upset and visibly frustrated. When asked directly he stated he feels his doctors don't know what they are doing. Explained he has a very complicated burden of medical conditions, some of which are potentially life limiting. Also explained that he has severely low BP and he has risk of developing complications as a result including stroke/ MI, shock. Suggested ER visit would be appropriate but he did not want to consider it.    I explained his kidney problem is secondary to his longstanding diabetes which evidently has been poorly controlled and has complications of diabetes as such, prior NSAID use, prior hypertension, cardiomyopathy and now persistent hypotension can cause further progression of CKD. Also explained to him that he has higher risk of LAYNE due to hypoperfusion/ nephrotoxins/ XIMENA.     Pt agreed to have labs checked today but did not want to go to ER for evaluation and management of hypotension.    I advised him to avoid losartan. I advised him to contact his cardiologist to discuss coreg and diuretic dosing. I reviewed what symptoms to look for which could suggest uremia and to report such promptly to local ER if he develops them. His family member expressed understanding.     Plan, labs,  recommendations were discussed with patient and his family member who feel overwhelmed by his complicated medical problems, their questions were answered to their satisfaction.   Total time spent was 55 minutes with >50% time spent in face to face evaluation, counseling about possible etiology, work up, monitoring, natural course of illness, recommendations.      RTC 6 weeks     Addendum  Labs post clinic noted for worsened BUN to 128, creatinine to 2.0. He has poor muscle mass. Plan to obtain 24 hour urine for creatinine clearance as suspect he has advanced CKD at this point. He also has hyperphosphatemia. Will recommend to follow low phos diet. Watch for any uremic symptoms. Serum creatinine does not seem very reliable marker for monitoring his kidney function. Elevated BUN is concerning for uremic toxin build up.

## 2019-05-02 RX ORDER — TORSEMIDE 20 MG/1
80 TABLET ORAL 2 TIMES DAILY
Qty: 120 TABLET | Refills: 0 | Status: SHIPPED | OUTPATIENT
Start: 2019-05-02 | End: 2019-05-22 | Stop reason: SDUPTHER

## 2019-05-02 NOTE — TELEPHONE ENCOUNTER
----- Message from Debbie Christopher sent at 5/2/2019  9:13 AM CDT -----  Contact: [facility]  Can the clinic reply in [facility]: No     Please refill the medication(s) listed below. Please call the patient when the prescription(s) is ready for  at the phone number [phone]    Medication #1: torsemide (DEMADEX) 20 MG Tab // Pharmacy called to advise this medication is on back order and the pt need an alternative. Please advise.     Medication #2:    Preferred Pharmacy: [facility] on file

## 2019-05-03 ENCOUNTER — TELEPHONE (OUTPATIENT)
Dept: FAMILY MEDICINE | Facility: CLINIC | Age: 59
End: 2019-05-03

## 2019-05-03 NOTE — TELEPHONE ENCOUNTER
She needs to contact his cardiologist to see which diuretic he/she wants Mr. Ray to be on.    Thanks  Dr. Davis

## 2019-05-03 NOTE — TELEPHONE ENCOUNTER
----- Message from Nanda Joe sent at 5/3/2019 10:59 AM CDT -----  Contact: Hailey Luevano  Type: Patient Call Back    Who called: Hailey Luevano    What is the request in detail: Sister of the pt is requesting a call back in ref to torsemide (DEMADEX) 20 MG Tab              Sister of the pt is stating that she has try several pharmacies, unfortunately no one has the medication. Please advise    Can the clinic reply by [facility]? No    Would the patient rather a call back or a response via [facility]?  Call back    Best call back number: [phone]   [phone]    Additional Information:

## 2019-05-03 NOTE — TELEPHONE ENCOUNTER
I spoke to the pt sister and advised her to call the cardiologist for an alternative diuretic. She verbalizes understanding.

## 2019-05-07 ENCOUNTER — HOSPITAL ENCOUNTER (OUTPATIENT)
Dept: WOUND CARE | Facility: HOSPITAL | Age: 59
Discharge: HOME | End: 2019-05-07
Attending: SURGERY
Payer: COMMERCIAL

## 2019-05-07 DIAGNOSIS — E11.621 TYPE 2 DIABETES MELLITUS WITH LEFT DIABETIC FOOT ULCER: Primary | ICD-10-CM

## 2019-05-07 DIAGNOSIS — L97.529 TYPE 2 DIABETES MELLITUS WITH LEFT DIABETIC FOOT ULCER: Primary | ICD-10-CM

## 2019-05-07 PROCEDURE — 97602 WOUND(S) CARE NON-SELECTIVE: CPT | Performed by: FAMILY MEDICINE

## 2019-05-07 PROCEDURE — 99214 OFFICE O/P EST MOD 30 MIN: CPT | Mod: ,,, | Performed by: FAMILY MEDICINE

## 2019-05-07 PROCEDURE — 99214 PR OFFICE/OUTPT VISIT, EST, LEVL IV, 30-39 MIN: ICD-10-PCS | Mod: ,,, | Performed by: FAMILY MEDICINE

## 2019-05-07 PROCEDURE — 99213 OFFICE O/P EST LOW 20 MIN: CPT | Performed by: FAMILY MEDICINE

## 2019-05-14 ENCOUNTER — HOSPITAL ENCOUNTER (OUTPATIENT)
Dept: WOUND CARE | Facility: HOSPITAL | Age: 59
Discharge: HOME | End: 2019-05-14
Attending: FAMILY MEDICINE
Payer: COMMERCIAL

## 2019-05-14 DIAGNOSIS — E11.621 TYPE 2 DIABETES MELLITUS WITH LEFT DIABETIC FOOT ULCER: Primary | ICD-10-CM

## 2019-05-14 DIAGNOSIS — L97.529 TYPE 2 DIABETES MELLITUS WITH LEFT DIABETIC FOOT ULCER: Primary | ICD-10-CM

## 2019-05-14 PROCEDURE — 97602 WOUND(S) CARE NON-SELECTIVE: CPT | Performed by: FAMILY MEDICINE

## 2019-05-14 PROCEDURE — 99214 PR OFFICE/OUTPT VISIT, EST, LEVL IV, 30-39 MIN: ICD-10-PCS | Mod: ,,, | Performed by: FAMILY MEDICINE

## 2019-05-14 PROCEDURE — 99214 OFFICE O/P EST MOD 30 MIN: CPT | Mod: ,,, | Performed by: FAMILY MEDICINE

## 2019-05-16 ENCOUNTER — OFFICE VISIT (OUTPATIENT)
Dept: VASCULAR SURGERY | Facility: CLINIC | Age: 59
End: 2019-05-16
Payer: COMMERCIAL

## 2019-05-16 ENCOUNTER — HOSPITAL ENCOUNTER (OUTPATIENT)
Dept: CARDIOLOGY | Facility: HOSPITAL | Age: 59
Discharge: HOME | End: 2019-05-16
Attending: SURGERY
Payer: COMMERCIAL

## 2019-05-16 VITALS
HEIGHT: 70 IN | BODY MASS INDEX: 25.34 KG/M2 | WEIGHT: 177 LBS | DIASTOLIC BLOOD PRESSURE: 56 MMHG | HEART RATE: 99 BPM | SYSTOLIC BLOOD PRESSURE: 86 MMHG

## 2019-05-16 DIAGNOSIS — I70.244: ICD-10-CM

## 2019-05-16 DIAGNOSIS — S91.302D OPEN WOUND OF LEFT FOOT, SUBSEQUENT ENCOUNTER: Primary | ICD-10-CM

## 2019-05-16 PROCEDURE — 93925 CV US DOPPLER ARTERIAL LEGS BILATERAL (CUPID ONLY): ICD-10-PCS | Mod: 26,,, | Performed by: SURGERY

## 2019-05-16 PROCEDURE — 3078F PR MOST RECENT DIASTOLIC BLOOD PRESSURE < 80 MM HG: ICD-10-PCS | Mod: CPTII,S$GLB,, | Performed by: SURGERY

## 2019-05-16 PROCEDURE — 3074F SYST BP LT 130 MM HG: CPT | Mod: CPTII,S$GLB,, | Performed by: SURGERY

## 2019-05-16 PROCEDURE — 99999 PR PBB SHADOW E&M-EST. PATIENT-LVL III: ICD-10-PCS | Mod: PBBFAC,,, | Performed by: SURGERY

## 2019-05-16 PROCEDURE — 3074F PR MOST RECENT SYSTOLIC BLOOD PRESSURE < 130 MM HG: ICD-10-PCS | Mod: CPTII,S$GLB,, | Performed by: SURGERY

## 2019-05-16 PROCEDURE — 3078F DIAST BP <80 MM HG: CPT | Mod: CPTII,S$GLB,, | Performed by: SURGERY

## 2019-05-16 PROCEDURE — 93922 CV US ANKLE BRACHIAL INDICES RESTING (CUPID ONLY): ICD-10-PCS | Mod: 26,,, | Performed by: SURGERY

## 2019-05-16 PROCEDURE — 93922 UPR/L XTREMITY ART 2 LEVELS: CPT | Mod: 50

## 2019-05-16 PROCEDURE — 3008F BODY MASS INDEX DOCD: CPT | Mod: CPTII,S$GLB,, | Performed by: SURGERY

## 2019-05-16 PROCEDURE — 93925 LOWER EXTREMITY STUDY: CPT | Mod: 26,,, | Performed by: SURGERY

## 2019-05-16 PROCEDURE — 99214 OFFICE O/P EST MOD 30 MIN: CPT | Mod: S$GLB,,, | Performed by: SURGERY

## 2019-05-16 PROCEDURE — 99214 PR OFFICE/OUTPT VISIT, EST, LEVL IV, 30-39 MIN: ICD-10-PCS | Mod: S$GLB,,, | Performed by: SURGERY

## 2019-05-16 PROCEDURE — 93925 LOWER EXTREMITY STUDY: CPT | Mod: 50

## 2019-05-16 PROCEDURE — 3008F PR BODY MASS INDEX (BMI) DOCUMENTED: ICD-10-PCS | Mod: CPTII,S$GLB,, | Performed by: SURGERY

## 2019-05-16 PROCEDURE — 93922 UPR/L XTREMITY ART 2 LEVELS: CPT | Mod: 26,,, | Performed by: SURGERY

## 2019-05-16 PROCEDURE — 99999 PR PBB SHADOW E&M-EST. PATIENT-LVL III: CPT | Mod: PBBFAC,,, | Performed by: SURGERY

## 2019-05-16 NOTE — PATIENT INSTRUCTIONS
Understanding Peripheral Arterial Disease    Peripheral arteries deliver oxygen-rich blood to the tissues outside the heart. As you age, your arteries become stiffer and thicker. In addition, risk factors, such as smoking and high cholesterol, can damage the artery lining. This allows a buildup of fat and other materials (plaque) to form within the artery walls. The buildup of plaque narrows the space inside the artery and sometimes blocks blood flow. Peripheral arterial disease (PAD) happens when blood flow through the arteries is reduced because of plaque buildup. It often happens in the legs and feet, but can also happen elsewhere in the body. If this buildup happens in the a large artery in the neck (carotid artery), it can be a major contributor to stroke.  A healthy artery  An artery is a muscular tube that carries oxgen rich blood and nutrients from the heart to the rest of the body. It has a smooth lining and flexible walls that allow blood to pass freely. When active, muscles need more oxygen. This increases blood flow. Healthy arteries can adapt to meet this need.  A damaged artery    PAD begins when the lining of an artery is damaged. This is often because of risk factors, such as smoking, older age, or diabetes. Plaque then starts to form within the artery wall. At this stage, blood flows normally, so youre not likely to have symptoms.  A narrowed artery    If plaque continues to build up, the space inside the artery narrows. The artery walls become less able to expand. The artery still provides enough blood and oxygen to your muscles during rest. But when youre active, the increased demand for blood cant be met. As a result, your leg may cramp or ache when you walk.  A blocked artery    An artery can become blocked by plaque or by a blood clot lodged in a narrowed section. When this happens, oxygen cant reach the muscle below the blockage. Then you may feel pain when lying down or when you are not  active (rest pain). This type of pain is especially common at night when youre lying flat. In time, the affected tissue can die. This can lead to the loss of a toe or foot.  Date Last Reviewed: 5/1/2016 © 2000-2017 [employer]. [address] [place] [id number]. All rights reserved. This information is not intended as a substitute for professional medical care. Always follow your healthcare professional's instructions.        Peripheral Artery Disease (PAD)     Peripheral artery disease (PAD) occurs when the arteries that carry blood to the limbs are narrowed or blocked. This is usually due to a buildup of a fatty substance called plaque in the walls of the arteries.  PAD most often affects the arteries in the legs. When these arteries are narrowed or blocked, blood flow to the legs is reduced. This can cause leg and foot pain and other symptoms. If severe enough, reduced blood flow to the legs can lead to tissue death (gangrene) and the loss of a toe, foot, or leg. Having PAD also makes it more likely that arteries in other body areas are blocked. For instance, arteries that carry blood to the heart or brain may be affected. This raises the chances of heart attack and stroke.  Risk factors  Certain factors can make PAD more likely. They include:  · Smoking  · Diabetes  · High blood pressure  · Unhealthy cholesterol levels  · Obesity  · Inactive lifestyle  · Older age  · Family history of PAD  Symptoms  Many people with PAD have no symptoms. If symptoms do occur, they can include:  · Pain in the muscles of the calves, thighs or hips that gets worse with activity and better with rest (intermittent claudication)  · Achy, tired, or heavy feeling in the legs  · Weakness, numbness, tingling, or loss of feeling in the legs  · Changes in skin color of the legs  · Sores on the legs and feet  · Cold leg, feet, or toes  · Pain the feet or toes even when lying down (rest pain)  Home care  PAD is a  chronic (lifelong) condition. Treatment is focused on managing your condition and lowering your health risks. This may include doing the following:  · If you smoke, quit. This helps prevent further damage to your arteries and lowers your health risks. Ask your provider about medicines or products that can help you quit smoking. Also consider joining a stop-smoking program or support group.  · Be more active. This helps you lose weight and manage problems such as high blood pressure and unhealthy cholesterol levels. Start a walking program if advised to by your provider. Your provider may also help you form a safe exercise program that is right for your needs.  · Make healthy eating changes. This includes eating less fat, salt, and sugar.  · Take medicines for high blood pressure, unhealthy cholesterol levels, and diabetes as directed.  · Have your blood pressure and cholesterol levels checked as often as directed.  · If you have diabetes, try to keep your blood sugar well controlled. Test your blood sugar as directed.  · If you are overweight, talk to your provider about forming a weight-loss plan.  · Watch for cuts, scrapes, or open sores on your feet. Poor blood flow to the feet may slow healing and increase the risk of infection from these problems.   Follow-up care  Follow up with your healthcare provider, or as advised. If imaging tests such as ultrasound were done, they will be reviewed by a doctor. You will be told the results and any new findings that may affect your care.  When to seek medical advice   Call your healthcare provider right away if any of these occur:  · Sudden severe pain in the legs or feet  · Sudden cold, pale or blue color in the legs or feet  · Weakness or numbness in the legs or feet that worsens  · Any sore or wound in the legs or feet that wont heal  · Weak pulse in your legs or feet  Know the Signs of Heart Attack and Stroke  People with PAD are at high risk for heart attack and  stroke. Knowing the signs of these problems can help you protect your health and get help when you need it. Call 911 right away if you have any of the following:  Signs of a Heart Attack  · Chest discomfort, such as pain, aching, tightness, or pressure that lasts more than a few minutes, or that comes and goes  · Pain or discomfort in the arms, back, shoulders, neck, or jaw  · Shortness of breath  · Sweating (often a cold, clammy sweat)  · Nausea  · Lightheadedness  Signs of a Stroke  · Sudden numbness or weakness of the face, arms, or legs, especially on one side  · Sudden confusion or trouble speaking or understanding  · Sudden trouble seeing in one or both eyes  · Sudden trouble walking, dizziness, or loss of balance  · Sudden, severe headache with no known cause   Date Last Reviewed: 9/21/2015  © 4356-7704 [employer]. [address] [place] [address]. All rights reserved. This information is not intended as a substitute for professional medical care. Always follow your healthcare professional's instructions.        A Walking Program for Peripheral Arterial Disease (PAD)  Peripheral arterial disease (PAD) is a condition where the arteries in the legs are severely damaged. When you have PAD, walking can be painful. So you may start to walk less. Walking less makes your leg muscles weaker. It then becomes harder and more painful to walk. A walking program can break this cycle. This sheet gives you tips on how to get started.     Walking farther without pain  Exercise strengthens leg muscles that are out of shape. It also trains these muscles to work with less oxygen. This helps your leg muscles work better even with reduced blood flow to your legs. When you have PAD, a walking program can be helpful. Your program can:  · Let you walk longer and farther without claudication. This is an ache in your legs during exercise that goes away with rest.  · Let you do more and be more active  · Add to  your overall health and well-being  · Help you control your blood sugar and blood pressure  · Help you become healthier with no risk and at little or no cost  Getting started  Your local hospital, vascular center, or cardiac rehab center may have a special walking program for people with PAD. If so, this is your best option. But if you cant find a program, or its not covered by insurance, you can still walk on your own. Follow these steps at each session:  · Step 1. Start at a pace that lets you walk for 5 to 10 minutes before you start to feel claudication. This feeling is unpleasant, but it doesnt hurt you. Keep going until the pain makes you feel that you need to stop.  · Step 2. Stop and rest for 3 to 5 minutes, just long enough for the pain to go away. You can rest standing or sitting. (Some people like to bring along a cane or a lightweight folding chair.)  · Step 3. Again walk at a pace that lets you walk for 5 to 10 minutes more before you feel pain. This may be slower than your starting pace in step 1. Then rest again.  · Step 4. Repeat this process until youve walked for 45 minutes. This should be about 60 to 80 minutes total, including resting time. You may not be able to do a full 45 minutes at first. Do as much as you can, and increase your time as you improve.  Making the most of your program  · Walk at least 3 times a week. Take no more than 2 days off between sessions. If you stop walking, even for a week or two, you can lose the health benefits of your program.  · Find a good place to walk. A treadmill or a track may be better at first. That way, you wont run the risk of going too far and finding that you cant walk back. Be sure to have a place to walk indoors in bad weather, such as a gym or a mall.  · Wear shoes with sturdy, flexible soles. The heel should fit without slipping. You should have room to wiggle your toes.  · Keep track of how long you walk. A pedometer will show your daily  progress. It can also show how much farther you can walk as time goes by.  · Ask a friend to keep you company. You may enjoy walking with someone else. Or you may want to make your walking sessions a time to relax by yourself.  · Make it fun. Listen to music while you walk and rest. Walk in a favorite park. Get to know the people at the gym, or the folks that you pass on your route. While you rest, you can window-shop, read, or feed the birds. Do whatever will help you enjoy your exercise sessions.  Date Last Reviewed: 6/1/2016  © 5594-1223 [employer]. [address] [place] [address], [place] [address]. All rights reserved. This information is not intended as a substitute for professional medical care. Always follow your healthcare professional's instructions.

## 2019-05-16 NOTE — H&P (VIEW-ONLY)
Mitch Chan MD [facility]                         05/16/2019    HPI:  Marvin Ray is a 59 y.o. male with   Patient Active Problem List   Diagnosis    Epiretinal membrane, both eyes    Nuclear sclerotic cataract of right eye    Pseudophakia, left eye    Ptosis, left eyelid    DM type 2 with diabetic peripheral neuropathy    HLD (hyperlipidemia)    Neovascular glaucoma of both eyes    Tophaceous gout    Essential hypertension    CAD (coronary artery disease)    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 edema, with long-term current use of insulin    Neovascular glaucoma of left eye, severe stage    Statin myopathy    Neovascular glaucoma, right eye, mild stage    Left leg cellulitis    PVD (peripheral vascular disease)    Right wrist pain    Multiple open wounds of lower leg    Hepatosplenomegaly    Non-pressure chronic ulcer of other part of left foot with fat layer exposed    Type 2 diabetes mellitus with left diabetic foot ulcer    Open wound of left foot    Cellulitis of left lower extremity    Diabetic foot infection    Other ascites    Severe malnutrition    Goals of care, counseling/discussion    Alteration in skin integrity    MDR Acinetobacter baumannii infection    Takes dietary supplements    Intertriginous dermatitis associated with moisture    Debility    Infection due to multidrug-resistant Pseudomonas aeruginosa    Subacute osteomyelitis of left foot    Ascites    Acute on chronic combined systolic and diastolic congestive heart failure    Stage 3 chronic kidney disease    Atherosclerosis of left lower extremity with ulceration of midfoot    CKD stage 3 due to type 2 diabetes mellitus    Azotemia    Hyponatremia    Anemia due to multiple mechanisms    Persistent proteinuria    being managed by PCP and specialists who is here today for  evaluation of L foot wound.  Seen in hospital last mo with LLE cellulitis.  Treated with Abx.  Also had paracentesis for ascites with negative w/u per family.  Patient states location is L foot occurring for 1-2 mo, worsening foot wound although improvement in edema and erythema.  Associated signs and symptoms include pain and edema.  Quality is aching and severity is 5/10.  Symptoms began 10/2018 spontaneously with blistering and severe edema.  Alleviating factors include wound care and elevation.  Worsening factors include pressure.    Tobacco use: denies    1/4/19: s/p LLE angioplasty and multiple subsequent OR and bedside debridements.  On IV Abx per culture results.  Glucose better controlled.  No fevers.  Receiving local wound care with Santyl.    1/14/19:  Cont to receive local wound care.  Pseudomonas in tissue and bone cultures multidrug resistant other than aminoglycoside; d/w Dr. Velez with high risk of ESRD with 6 weeks of aminoglycoside treatment.  No F/C.    1/31/19:  Pt without complaints.  Was started back on Bactrim.  Family doing wound care.    2/28/19: S/p L foot debridement 2/13/19.  Started on Meropenem.    3/28/19:  No complaints.  S/p paracentesis and pt feels better.    Interval history:  S/p L peroneal and AT angioplasty, wound healing well.    Past Medical History:   Diagnosis Date    Arthritis     Cellulitis     CKD (chronic kidney disease), stage III     Coronary artery disease     Diabetes mellitus     Diabetic retinopathy     Diabetic ulcer of left foot     Glaucoma     Gout     Hyperlipemia     Hypertension     ICD (implantable cardioverter-defibrillator) in place 11/02/2018    Left chest    Non-pressure chronic ulcer of other part of left foot with fat layer exposed 10/23/2018    PVD (peripheral vascular disease)     Type 2 diabetes mellitus with left diabetic foot ulcer 10/29/2018    Unsteady gait     uses a wheelchair     Past Surgical History:   Procedure Laterality  Date    AHMED GLAUCOMA IMPLANT Left 2011    DONE AT [facility]    AMPUTATION, TOE Left 12/5/2018    Performed by Mitch Chan MD at [facility] OR    AMPUTATION, TOES  2-5 Left 11/16/2018    Performed by Mitch Chan MD at [facility] OR    AORTOGRAM, WITH SERIALOGRAPHY, LEFT LOWER EXTREMITY, POSSIBLE INTERVENTION Left 4/30/2019    Performed by Mitch Chan MD at [facility] OR    BAERVELDT GLAUCOMA IMPLANT Left 2012    WITH CATARACT EXTRACTION//DONE AT [facility]    CARDIAC CATHETERIZATION Left 05/2016    CARDIAC DEFIBRILLATOR PLACEMENT Left 11/02/2018    CATARACT EXTRACTION W/  INTRAOCULAR LENS IMPLANT Left 2012    WITH BAERVEDT//DONE AT [facility]    CATARACT EXTRACTION W/  INTRAOCULAR LENS IMPLANT Right 09/26/2018    COMPLEX ()    CB DESTRUCTION WITH CYCLO G6 Left 02/15/2017        CYST REMOVAL      DEBRIDEMENT, FOOT  12/28/2018    Performed by Mitch Chan MD at [facility] OR    Exam under anesthesia, left foot debridement, left foot washout, possible left second through fifth metatarsal resection Left 12/28/2018    Performed by Mitch Chan MD at [facility] OR    Exam under anesthesia, left foot debridement, washout and all other indicated procedures Left 12/5/2018    Performed by Mitch Chan MD at [facility] OR    EXCISION, LESION, METATARSAL BONE  12/28/2018    Performed by Mitch Chan MD at [facility] OR    HEART CATH-LEFT N/A 5/6/2016    Performed by Mike Magana MD at [facility] CATH LAB    INCISION AND DRAINAGE Left 11/16/2018    Performed by Mitch Chan MD at [facility] OR    Incision and Drainage, left lower extremity debridement, washout Left 11/14/2018    Performed by Mitch Chan MD at [facility] OR    INSERTION, ICD GENERATOR, SINGLE CHAMBER N/A 11/2/2018    Performed by Pernell Greer MD at [facility] CATH LAB    INSERTION, IOL PROSTHESIS Right 9/26/2018    Performed by Perla Cortés MD at [facility] OR 1ST FLR    LEFT FOOT WOUND DEBRIDEMENT,  WASHOUT AND ALL OTHER INDICATED PROCEDURES Left 2/13/2019    [facility]formed by [facility] at [facility] OR    PARACENTESIS, ABDOMINAL, INITIAL N/A 3/25/2019    [facility] by [facility] OR    PARACENTESIS, ABDOMINAL, INITIAL N/A 3/1/2019    [facility] by [facility] OR    PARACENTESIS, ABDOMINAL, REPEAT N/A 2/11/2019    [facility] by [facility] OR    PHACOEMULSIFICATION, CATARACT Right 9/26/2018    Performed by [facility] at [facility] OR [address] [place] [address]    Right foot surgery  10/2014    TOE AMPUTATION Right     first and second    TONSILLECTOMY      TRABECULECTOMY/G 6 LASER Left 2/15/2017    Performed by [facility] at [facility] OR [address] [place] [address]     Family History   Problem Relation Age of Onset    Diabetes Mother     Heart disease Brother     No Known Problems Father     No Known Problems Sister     No Known Problems Maternal Aunt     No Known Problems Maternal Uncle     No Known Problems Paternal Aunt     No Known Problems Paternal Uncle     No Known Problems Maternal Grandmother     No Known Problems Maternal Grandfather     No Known Problems Paternal Grandmother     No Known Problems Paternal Grandfather     Anemia Neg Hx     Arrhythmia Neg Hx     Asthma Neg Hx     Clotting disorder Neg Hx     Fainting Neg Hx     Heart attack Neg Hx     Heart failure Neg Hx     Hyperlipidemia Neg Hx     Hypertension Neg Hx     Stroke Neg Hx     Atrial Septal Defect Neg Hx     Amblyopia Neg Hx     Blindness Neg Hx     Glaucoma Neg Hx     Macular degeneration Neg Hx     Retinal detachment Neg Hx     Strabismus Neg Hx      Social History     Socioeconomic History    Marital status: Single     Spouse name: Not on file    Number of children: Not on file    Years of education: 14    Highest education level: Not on file   Occupational History    Not on file   Social Needs    Financial resource strain: Not on file    Food  insecurity:     Worry: Not on file     Inability: Not on file    Transportation needs:     Medical: Not on file     Non-medical: Not on file   Tobacco Use    Smoking status: Former Smoker     Packs/day: 1.00     Years: 3.00     Pack years: 3.00     Types: Cigarettes     Last attempt to quit: 1984     Years since quittin.8    Smokeless tobacco: Never Used   Substance and Sexual Activity    Alcohol use: Not Currently     Alcohol/week: 0.6 oz     Types: 1 Cans of beer per week     Comment: rarely    Drug use: No    Sexual activity: Not Currently     Partners: Female   Lifestyle    Physical activity:     Days per week: Not on file     Minutes per session: Not on file    Stress: Not on file   Relationships    Social connections:     Talks on phone: Not on file     Gets together: Not on file     Attends Sabianism service: Not on file     Active member of club or organization: Not on file     Attends meetings of clubs or organizations: Not on file     Relationship status: Not on file   Other Topics Concern    Not on file   Social History Narrative    Not on file       Current Outpatient Medications:     allopurinol (ZYLOPRIM) 300 MG tablet, Take 1 tablet (300 mg total) by mouth once daily., Disp: 30 tablet, Rfl: 3    aspirin 325 MG tablet, Take 325 mg by mouth once daily. , Disp: , Rfl:     bisacodyl (DULCOLAX) 5 mg EC tablet, Take 5 mg by mouth daily as needed for Constipation., Disp: , Rfl:     brimonidine 0.2% (ALPHAGAN) 0.2 % Drop, Place 1 drop into both eyes 2 (two) times daily., Disp: 10 mL, Rfl: 11    carvedilol (COREG) 3.125 MG tablet, Take 1 tablet (3.125 mg total) by mouth 2 (two) times daily., Disp: 60 tablet, Rfl: 11    carvedilol (COREG) 6.25 MG tablet, , Disp: , Rfl: 11    coenzyme Q10 100 mg capsule, Take 100 mg by mouth every morning., Disp: , Rfl:     dorzolamide-timolol 2-0.5% (COSOPT) 22.3-6.8 mg/mL ophthalmic solution, Place 1 drop into both eyes 2 (two) times daily., Disp:  "1 Bottle, Rfl: 11    ezetimibe (ZETIA) 10 mg tablet, TAKE 1 TABLET(10 MG) BY MOUTH EVERY DAY, Disp: 90 tablet, Rfl: 0    fish oil-omega-3 fatty acids 300-1,000 mg capsule, Take 1 capsule by mouth 2 (two) times daily., Disp: 60 capsule, Rfl: 3    furosemide (LASIX) 20 MG tablet, , Disp: , Rfl:     insulin aspart U-100 (NOVOLOG) 100 unit/mL InPn pen, Use on premeal readings: 150-200=+2, 201-250=+4; 251-300=+6; 301-350=+8, over 350=+10 units, Disp: 2.7 mL, Rfl: 11    insulin degludec-liraglutide (XULTOPHY 100/3.6) 100 unit-3.6 mg /mL (3 mL) InPn, Inject 42 Units into the skin once daily., Disp: 5 Syringe, Rfl: 5    losartan (COZAAR) 25 MG tablet, Take 1 tablet (25 mg total) by mouth once daily., Disp: 90 tablet, Rfl: 3    metOLazone (ZAROXOLYN) 5 MG tablet, Take 1 tablet (5 mg total) by mouth every Monday and Thursday. 30 minutes before lasix., Disp: 30 tablet, Rfl: 2    miconazole NITRATE 2 % (MICOTIN) 2 % top powder, Apply topically 2 (two) times daily., Disp: , Rfl: 0    MULTIVIT,THER IRON,CA,FA & MIN (MULTIVITAMIN) Tab, Take 1 tablet by mouth every morning. , Disp: , Rfl:     oxyCODONE-acetaminophen (PERCOCET) 5-325 mg per tablet, Take 1 tablet by mouth every 6 (six) hours as needed for Pain., Disp: 45 tablet, Rfl: 0    pen needle, diabetic 31 gauge x 1/4" Ndle, Use as directed, Disp: 100 each, Rfl: 11    SANTYL ointment, FEDERICO TOPICALLY ONCE D, Disp: , Rfl: 0    torsemide (DEMADEX) 20 MG Tab, Take 4 tablets (80 mg total) by mouth 2 (two) times daily., Disp: 120 tablet, Rfl: 0  No current facility-administered medications for this visit.     Facility-Administered Medications Ordered in Other Visits:     lactated ringers infusion, , Intravenous, Continuous, Tam Reynolds MD    lidocaine (PF) 10 mg/ml (1%) injection 10 mg, 1 mL, Intradermal, Once, Tam Reynolds MD    REVIEW OF SYSTEMS:  General: No fevers or chills; ENT: No sore throat; Allergy and Immunology: no persistent infections; Hematological " and Lymphatic: No history of bleeding or easy bruising; Endocrine: negative; Respiratory: no cough, shortness of breath, or wheezing; Cardiovascular: no chest pain or dyspnea on exertion; Gastrointestinal: no abdominal pain/back, change in bowel habits, or bloody stools; Genito-Urinary: no dysuria, trouble voiding, or hematuria; Musculoskeletal: negative; Neurological: no TIA or stroke symptoms; Psychiatric: no nervousness, anxiety or depression.    PHYSICAL EXAM:      Pulse: 99         General appearance:  Alert, well-appearing, and in no distress.  Oriented to person, place, and time                    Neurological: Normal speech, no focal findings noted; CN II - XII grossly intact. RLE with sensation to light touch, LLE with sensation to light touch.            Musculoskeletal: Digits/nail without cyanosis/clubbing.  Strength 5/5 BLE.                    Neck: Supple, no significant adenopathy                  Chest:  Clear to auscultation, no wheezes, rales or rhonchi, symmetric air entry. No use of accessory muscles               Cardiac: Normal rate and regular rhythm, S1 and S2 normal            Abdomen: Soft, nontender, nondistended, no masses or organomegaly, no hernia     No rebound tenderness noted; bowel sounds normal     No groin adenopathy      Extremities:   2+ R femoral pulse, 2+ L femoral pulse     doppler+ R popliteal pulse, doppler+ L popliteal pulse     doppler+ R PT pulse, doppler+ L PT pulse     doppler+ R DP pulse, doppler+ L DP pulse     1+ RLE edema, 2+ LLE edema    Skin: LLE with minimal brawny edema, +large foot wound with minimal fibrinous and improved granulation tissue, no odor, no purulence or erythema    LAB RESULTS:  No results found for: CBC  Lab Results   Component Value Date    LABPROT 10.9 04/23/2019    INR 1.0 04/23/2019     Lab Results   Component Value Date     (L) 05/01/2019    K 3.8 05/01/2019    CL 98 05/01/2019    CO2 23 05/01/2019     (H) 05/01/2019      (H) 05/01/2019    CREATININE 2.0 (H) 05/01/2019    CALCIUM 9.5 05/01/2019    ANIONGAP 11 05/01/2019    EGFRNONAA 35.0 (A) 05/01/2019     Lab Results   Component Value Date    WBC 6.77 04/23/2019    RBC 3.44 (L) 04/23/2019    HGB 9.1 (L) 04/23/2019    HCT 28.8 (L) 04/23/2019    MCV 84 04/23/2019    MCH 26.5 (L) 04/23/2019    MCHC 31.6 (L) 04/23/2019    RDW 19.4 (H) 04/23/2019     (H) 04/23/2019    MPV 9.4 04/23/2019    GRAN 5.1 04/23/2019    GRAN 74.7 (H) 04/23/2019    LYMPH 0.6 (L) 04/23/2019    LYMPH 9.5 (L) 04/23/2019    MONO 0.7 04/23/2019    MONO 10.9 04/23/2019    EOS 0.3 04/23/2019    BASO 0.02 04/23/2019    EOSINOPHIL 4.7 04/23/2019    BASOPHIL 0.3 04/23/2019    DIFFMETHOD Automated 04/23/2019     .  Lab Results   Component Value Date    HGBA1C 6.7 (H) 03/11/2019       IMAGING:  All pertinent imaging has been reviewed and interpreted independently.    BLE arterial US 1/2019: No focal stenosis    5/16/19: BLE with no HD significant stenosis, SIDRA 0.8/1.46, left ankle pressures 60 and 171    IMP/PLAN:  59 y.o. male with   Patient Active Problem List   Diagnosis    Epiretinal membrane, both eyes    Nuclear sclerotic cataract of right eye    Pseudophakia, left eye    Ptosis, left eyelid    DM type 2 with diabetic peripheral neuropathy    HLD (hyperlipidemia)    Neovascular glaucoma of both eyes    Tophaceous gout    Essential hypertension    CAD (coronary artery disease)    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 edema, with long-term current use of insulin    Neovascular glaucoma of left eye, severe stage    Statin myopathy    Neovascular glaucoma, right eye, mild stage    Left leg cellulitis    PVD (peripheral vascular disease)    Right wrist pain    Multiple open wounds of lower leg    Hepatosplenomegaly    Non-pressure chronic ulcer of other part of left foot with fat layer exposed    Type 2 diabetes mellitus with left  diabetic foot ulcer    Open wound of left foot    Cellulitis of left lower extremity    Diabetic foot infection    Other ascites    Severe malnutrition    Goals of care, counseling/discussion    Alteration in skin integrity    MDR Acinetobacter baumannii infection    Takes dietary supplements    Intertriginous dermatitis associated with moisture    Debility    Infection due to multidrug-resistant Pseudomonas aeruginosa    Subacute osteomyelitis of left foot    Ascites    Acute on chronic combined systolic and diastolic congestive heart failure    Stage 3 chronic kidney disease    Atherosclerosis of left lower extremity with ulceration of midfoot    CKD stage 3 due to type 2 diabetes mellitus    Azotemia    Hyponatremia    Anemia due to multiple mechanisms    Persistent proteinuria    being managed by PCP and specialists who is here today for evaluation of L foot wound.    -L foot wound improved s/p debridement 2/13/19, multifactorial due to diabetes, PVD and venous insufficiency with improvement in granulation tissue on Abx due to multidrug resistent bacteria in the past s/p debridements and L tibial angioplasty x 2 with improvement in wound +granulation tissue - will discuss further with Plastic Surgery re: coverage / skin grafting vs further debridement  -Cont ID rec Abx regimen  -Recommend continued wound care center care   -Rec BLE Xeroform and dry kerlix wraps twice daily to shin regions with elevation of LLE above level of the heart  -Low sodium diet  -Strict glycemic control  -Will call with further plan of care    I spent 20 minutes evaluating this patient and greater than 50% of the time was spent counseling, coordinator care and discussing the plan of care.  All questions were answered and patient stated understanding with agreement with the above treatment plan.    Mitch Chan MD [facility]  Vascular and Endovascular Surgery

## 2019-05-16 NOTE — PROGRESS NOTES
Mitch Chan MD [facility]                         05/16/2019    HPI:  Marvin Ray is a 59 y.o. male with   Patient Active Problem List   Diagnosis    Epiretinal membrane, both eyes    Nuclear sclerotic cataract of right eye    Pseudophakia, left eye    Ptosis, left eyelid    DM type 2 with diabetic peripheral neuropathy    HLD (hyperlipidemia)    Neovascular glaucoma of both eyes    Tophaceous gout    Essential hypertension    CAD (coronary artery disease)    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 edema, with long-term current use of insulin    Neovascular glaucoma of left eye, severe stage    Statin myopathy    Neovascular glaucoma, right eye, mild stage    Left leg cellulitis    PVD (peripheral vascular disease)    Right wrist pain    Multiple open wounds of lower leg    Hepatosplenomegaly    Non-pressure chronic ulcer of other part of left foot with fat layer exposed    Type 2 diabetes mellitus with left diabetic foot ulcer    Open wound of left foot    Cellulitis of left lower extremity    Diabetic foot infection    Other ascites    Severe malnutrition    Goals of care, counseling/discussion    Alteration in skin integrity    MDR Acinetobacter baumannii infection    Takes dietary supplements    Intertriginous dermatitis associated with moisture    Debility    Infection due to multidrug-resistant Pseudomonas aeruginosa    Subacute osteomyelitis of left foot    Ascites    Acute on chronic combined systolic and diastolic congestive heart failure    Stage 3 chronic kidney disease    Atherosclerosis of left lower extremity with ulceration of midfoot    CKD stage 3 due to type 2 diabetes mellitus    Azotemia    Hyponatremia    Anemia due to multiple mechanisms    Persistent proteinuria    being managed by PCP and specialists who is here today for  evaluation of L foot wound.  Seen in hospital last mo with LLE cellulitis.  Treated with Abx.  Also had paracentesis for ascites with negative w/u per family.  Patient states location is L foot occurring for 1-2 mo, worsening foot wound although improvement in edema and erythema.  Associated signs and symptoms include pain and edema.  Quality is aching and severity is 5/10.  Symptoms began 10/2018 spontaneously with blistering and severe edema.  Alleviating factors include wound care and elevation.  Worsening factors include pressure.    Tobacco use: denies    1/4/19: s/p LLE angioplasty and multiple subsequent OR and bedside debridements.  On IV Abx per culture results.  Glucose better controlled.  No fevers.  Receiving local wound care with Santyl.    1/14/19:  Cont to receive local wound care.  Pseudomonas in tissue and bone cultures multidrug resistant other than aminoglycoside; d/w Dr. Velez with high risk of ESRD with 6 weeks of aminoglycoside treatment.  No F/C.    1/31/19:  Pt without complaints.  Was started back on Bactrim.  Family doing wound care.    2/28/19: S/p L foot debridement 2/13/19.  Started on Meropenem.    3/28/19:  No complaints.  S/p paracentesis and pt feels better.    Interval history:  S/p L peroneal and AT angioplasty, wound healing well.    Past Medical History:   Diagnosis Date    Arthritis     Cellulitis     CKD (chronic kidney disease), stage III     Coronary artery disease     Diabetes mellitus     Diabetic retinopathy     Diabetic ulcer of left foot     Glaucoma     Gout     Hyperlipemia     Hypertension     ICD (implantable cardioverter-defibrillator) in place 11/02/2018    Left chest    Non-pressure chronic ulcer of other part of left foot with fat layer exposed 10/23/2018    PVD (peripheral vascular disease)     Type 2 diabetes mellitus with left diabetic foot ulcer 10/29/2018    Unsteady gait     uses a wheelchair     Past Surgical History:   Procedure Laterality  Date    AHMED GLAUCOMA IMPLANT Left 2011    DONE AT [facility]    AMPUTATION, TOE Left 12/5/2018    [facility]formed by [facility] Chan [facility] at [facility] OR    AMPUTATION, TOES  2-5 Left 11/16/2018    [facility] by [facility] at [facility]    AORTOGRAM, WITH SERIALOGRAPHY, LEFT LOWER EXTREMITY, POSSIBLE INTERVENTION Left 4/30/2019    [facility] by [facility] at [facility]    BAERVELDT GLAUCOMA IMPLANT Left 2012    WITH CATARACT EXTRACTION//DONE AT [facility]    CARDIAC CATHETERIZATION Left 05/2016    CARDIAC DEFIBRILLATOR PLACEMENT Left 11/02/2018    CATARACT EXTRACTION W/  INTRAOCULAR LENS IMPLANT Left 2012    WITH BAERVEDT//DONE AT [facility]    CATARACT EXTRACTION W/  INTRAOCULAR LENS IMPLANT Right 09/26/2018    COMPLEX ()    CB DESTRUCTION WITH CYCLO G6 Left 02/15/2017        CYST REMOVAL      DEBRIDEMENT, FOOT  12/28/2018    Per[facility] by Mitch [facility] at [facility] OR    Exam under anesthesia, left foot debridement, left foot washout, possible left second through fifth metatarsal resection Left 12/28/2018    [facility] by Mitch [facility] at [facility] OR    Exam under anesthesia, left foot debridement, washout and all other indicated procedures Left 12/5/2018    [facility]    EXCISION, LESION, METATARSAL BONE  12/28/2018    [facility] by Mitch [facility] at [facility]    HEART CATH-LEFT N/A 5/6/2016    Performed by [facility] at [facility] CATH LAB    INCISION AND DRAINAGE Left 11/16/2018    Performed by Mitch [facility] at [facility] OR    Incision and Drainage, left lower extremity debridement, washout Left 11/14/2018    Performed by Mitch Chan [facility] at [facility] OR    INSERTION, ICD GENERATOR, SINGLE CHAMBER N/A 11/2/2018    Performed by Pernell Greer MD at [facility] CATH LAB    INSERTION, IOL PROSTHESIS Right 9/26/2018    Performed by Perla Cortés MD at [facility] OR 1ST FLR    LEFT FOOT WOUND DEBRIDEMENT,  WASHOUT AND ALL OTHER INDICATED PROCEDURES Left 2/13/2019    Performed by [facility] MD at [facility] OR    PARACENTESIS, ABDOMINAL, INITIAL N/A 3/25/2019    Performed by [facility] Provider at [facility] OR    PARACENTESIS, ABDOMINAL, INITIAL N/A 3/1/2019    Performed by [facility] at [facility] OR    PARACENTESIS, ABDOMINAL, REPEAT N/A 2/11/2019    Performed by [facility] at [facility] OR    PHACOEMULSIFICATION, CATARACT Right 9/26/2018    Performed by [facility]la Cort[facility] MD at [facility] OR [address] [place] [address]    Right foot surgery  10/2014    TOE AMPUTATION Right     first and second    TONSILLECTOMY      TRABECULECTOMY/G 6 LASER Left 2/15/2017    Performed by Perla Cortés MD at [facility] OR [address] [place] [address]     Family History   Problem Relation Age of Onset    Diabetes Mother     Heart disease Brother     No Known Problems Father     No Known Problems Sister     No Known Problems Maternal Aunt     No Known Problems Maternal Uncle     No Known Problems Paternal Aunt     No Known Problems Paternal Uncle     No Known Problems Maternal Grandmother     No Known Problems Maternal Grandfather     No Known Problems Paternal Grandmother     No Known Problems Paternal Grandfather     Anemia Neg Hx     Arrhythmia Neg Hx     Asthma Neg Hx     Clotting disorder Neg Hx     Fainting Neg Hx     Heart attack Neg Hx     Heart failure Neg Hx     Hyperlipidemia Neg Hx     Hypertension Neg Hx     Stroke Neg Hx     Atrial Septal Defect Neg Hx     Amblyopia Neg Hx     Blindness Neg Hx     Glaucoma Neg Hx     Macular degeneration Neg Hx     Retinal detachment Neg Hx     Strabismus Neg Hx      Social History     Socioeconomic History    Marital status: Single     Spouse name: Not on file    Number of children: Not on file    Years of education: 14    Highest education level: Not on file   Occupational History    Not on file   Social Needs    Financial resource strain: Not on file    Food  insecurity:     Worry: Not on file     Inability: Not on file    Transportation needs:     Medical: Not on file     Non-medical: Not on file   Tobacco Use    Smoking status: Former Smoker     Packs/day: 1.00     Years: 3.00     Pack years: 3.00     Types: Cigarettes     Last attempt to quit: 1984     Years since quittin.8    Smokeless tobacco: Never Used   Substance and Sexual Activity    Alcohol use: Not Currently     Alcohol/week: 0.6 oz     Types: 1 Cans of beer per week     Comment: rarely    Drug use: No    Sexual activity: Not Currently     Partners: Female   Lifestyle    Physical activity:     Days per week: Not on file     Minutes per session: Not on file    Stress: Not on file   Relationships    Social connections:     Talks on phone: Not on file     Gets together: Not on file     Attends Alevism service: Not on file     Active member of club or organization: Not on file     Attends meetings of clubs or organizations: Not on file     Relationship status: Not on file   Other Topics Concern    Not on file   Social History Narrative    Not on file       Current Outpatient Medications:     allopurinol (ZYLOPRIM) 300 MG tablet, Take 1 tablet (300 mg total) by mouth once daily., Disp: 30 tablet, Rfl: 3    aspirin 325 MG tablet, Take 325 mg by mouth once daily. , Disp: , Rfl:     bisacodyl (DULCOLAX) 5 mg EC tablet, Take 5 mg by mouth daily as needed for Constipation., Disp: , Rfl:     brimonidine 0.2% (ALPHAGAN) 0.2 % Drop, Place 1 drop into both eyes 2 (two) times daily., Disp: 10 mL, Rfl: 11    carvedilol (COREG) 3.125 MG tablet, Take 1 tablet (3.125 mg total) by mouth 2 (two) times daily., Disp: 60 tablet, Rfl: 11    carvedilol (COREG) 6.25 MG tablet, , Disp: , Rfl: 11    coenzyme Q10 100 mg capsule, Take 100 mg by mouth every morning., Disp: , Rfl:     dorzolamide-timolol 2-0.5% (COSOPT) 22.3-6.8 mg/mL ophthalmic solution, Place 1 drop into both eyes 2 (two) times daily., Disp:  "1 Bottle, Rfl: 11    ezetimibe (ZETIA) 10 mg tablet, TAKE 1 TABLET(10 MG) BY MOUTH EVERY DAY, Disp: 90 tablet, Rfl: 0    fish oil-omega-3 fatty acids 300-1,000 mg capsule, Take 1 capsule by mouth 2 (two) times daily., Disp: 60 capsule, Rfl: 3    furosemide (LASIX) 20 MG tablet, , Disp: , Rfl:     insulin aspart U-100 (NOVOLOG) 100 unit/mL InPn pen, Use on premeal readings: 150-200=+2, 201-250=+4; 251-300=+6; 301-350=+8, over 350=+10 units, Disp: 2.7 mL, Rfl: 11    insulin degludec-liraglutide (XULTOPHY 100/3.6) 100 unit-3.6 mg /mL (3 mL) InPn, Inject 42 Units into the skin once daily., Disp: 5 Syringe, Rfl: 5    losartan (COZAAR) 25 MG tablet, Take 1 tablet (25 mg total) by mouth once daily., Disp: 90 tablet, Rfl: 3    metOLazone (ZAROXOLYN) 5 MG tablet, Take 1 tablet (5 mg total) by mouth every Monday and Thursday. 30 minutes before lasix., Disp: 30 tablet, Rfl: 2    miconazole NITRATE 2 % (MICOTIN) 2 % top powder, Apply topically 2 (two) times daily., Disp: , Rfl: 0    MULTIVIT,THER IRON,CA,FA & MIN (MULTIVITAMIN) Tab, Take 1 tablet by mouth every morning. , Disp: , Rfl:     oxyCODONE-acetaminophen (PERCOCET) 5-325 mg per tablet, Take 1 tablet by mouth every 6 (six) hours as needed for Pain., Disp: 45 tablet, Rfl: 0    pen needle, diabetic 31 gauge x 1/4" Ndle, Use as directed, Disp: 100 each, Rfl: 11    SANTYL ointment, FEDERICO TOPICALLY ONCE D, Disp: , Rfl: 0    torsemide (DEMADEX) 20 MG Tab, Take 4 tablets (80 mg total) by mouth 2 (two) times daily., Disp: 120 tablet, Rfl: 0  No current facility-administered medications for this visit.     Facility-Administered Medications Ordered in Other Visits:     lactated ringers infusion, , Intravenous, Continuous, Tam Reynolds MD    lidocaine (PF) 10 mg/ml (1%) injection 10 mg, 1 mL, Intradermal, Once, Tam Reynolds MD    REVIEW OF SYSTEMS:  General: No fevers or chills; ENT: No sore throat; Allergy and Immunology: no persistent infections; Hematological " and Lymphatic: No history of bleeding or easy bruising; Endocrine: negative; Respiratory: no cough, shortness of breath, or wheezing; Cardiovascular: no chest pain or dyspnea on exertion; Gastrointestinal: no abdominal pain/back, change in bowel habits, or bloody stools; Genito-Urinary: no dysuria, trouble voiding, or hematuria; Musculoskeletal: negative; Neurological: no TIA or stroke symptoms; Psychiatric: no nervousness, anxiety or depression.    PHYSICAL EXAM:      Pulse: 99         General appearance:  Alert, well-appearing, and in no distress.  Oriented to person, place, and time                    Neurological: Normal speech, no focal findings noted; CN II - XII grossly intact. RLE with sensation to light touch, LLE with sensation to light touch.            Musculoskeletal: Digits/nail without cyanosis/clubbing.  Strength 5/5 BLE.                    Neck: Supple, no significant adenopathy                  Chest:  Clear to auscultation, no wheezes, rales or rhonchi, symmetric air entry. No use of accessory muscles               Cardiac: Normal rate and regular rhythm, S1 and S2 normal            Abdomen: Soft, nontender, nondistended, no masses or organomegaly, no hernia     No rebound tenderness noted; bowel sounds normal     No groin adenopathy      Extremities:   2+ R femoral pulse, 2+ L femoral pulse     doppler+ R popliteal pulse, doppler+ L popliteal pulse     doppler+ R PT pulse, doppler+ L PT pulse     doppler+ R DP pulse, doppler+ L DP pulse     1+ RLE edema, 2+ LLE edema    Skin: LLE with minimal brawny edema, +large foot wound with minimal fibrinous and improved granulation tissue, no odor, no purulence or erythema    LAB RESULTS:  No results found for: CBC  Lab Results   Component Value Date    LABPROT 10.9 04/23/2019    INR 1.0 04/23/2019     Lab Results   Component Value Date     (L) 05/01/2019    K 3.8 05/01/2019    CL 98 05/01/2019    CO2 23 05/01/2019     (H) 05/01/2019      (H) 05/01/2019    CREATININE 2.0 (H) 05/01/2019    CALCIUM 9.5 05/01/2019    ANIONGAP 11 05/01/2019    EGFRNONAA 35.0 (A) 05/01/2019     Lab Results   Component Value Date    WBC 6.77 04/23/2019    RBC 3.44 (L) 04/23/2019    HGB 9.1 (L) 04/23/2019    HCT 28.8 (L) 04/23/2019    MCV 84 04/23/2019    MCH 26.5 (L) 04/23/2019    MCHC 31.6 (L) 04/23/2019    RDW 19.4 (H) 04/23/2019     (H) 04/23/2019    MPV 9.4 04/23/2019    GRAN 5.1 04/23/2019    GRAN 74.7 (H) 04/23/2019    LYMPH 0.6 (L) 04/23/2019    LYMPH 9.5 (L) 04/23/2019    MONO 0.7 04/23/2019    MONO 10.9 04/23/2019    EOS 0.3 04/23/2019    BASO 0.02 04/23/2019    EOSINOPHIL 4.7 04/23/2019    BASOPHIL 0.3 04/23/2019    DIFFMETHOD Automated 04/23/2019     .  Lab Results   Component Value Date    HGBA1C 6.7 (H) 03/11/2019       IMAGING:  All pertinent imaging has been reviewed and interpreted independently.    BLE arterial US 1/2019: No focal stenosis    5/16/19: BLE with no HD significant stenosis, SIDRA 0.8/1.46, left ankle pressures 60 and 171    IMP/PLAN:  59 y.o. male with   Patient Active Problem List   Diagnosis    Epiretinal membrane, both eyes    Nuclear sclerotic cataract of right eye    Pseudophakia, left eye    Ptosis, left eyelid    DM type 2 with diabetic peripheral neuropathy    HLD (hyperlipidemia)    Neovascular glaucoma of both eyes    Tophaceous gout    Essential hypertension    CAD (coronary artery disease)    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 edema, with long-term current use of insulin    Neovascular glaucoma of left eye, severe stage    Statin myopathy    Neovascular glaucoma, right eye, mild stage    Left leg cellulitis    PVD (peripheral vascular disease)    Right wrist pain    Multiple open wounds of lower leg    Hepatosplenomegaly    Non-pressure chronic ulcer of other part of left foot with fat layer exposed    Type 2 diabetes mellitus with left  diabetic foot ulcer    Open wound of left foot    Cellulitis of left lower extremity    Diabetic foot infection    Other ascites    Severe malnutrition    Goals of care, counseling/discussion    Alteration in skin integrity    MDR Acinetobacter baumannii infection    Takes dietary supplements    Intertriginous dermatitis associated with moisture    Debility    Infection due to multidrug-resistant Pseudomonas aeruginosa    Subacute osteomyelitis of left foot    Ascites    Acute on chronic combined systolic and diastolic congestive heart failure    Stage 3 chronic kidney disease    Atherosclerosis of left lower extremity with ulceration of midfoot    CKD stage 3 due to type 2 diabetes mellitus    Azotemia    Hyponatremia    Anemia due to multiple mechanisms    Persistent proteinuria    being managed by PCP and specialists who is here today for evaluation of L foot wound.    -L foot wound improved s/p debridement 2/13/19, multifactorial due to diabetes, PVD and venous insufficiency with improvement in granulation tissue on Abx due to multidrug resistent bacteria in the past s/p debridements and L tibial angioplasty x 2 with improvement in wound +granulation tissue - will discuss further with Plastic Surgery re: coverage / skin grafting vs further debridement  -Cont ID rec Abx regimen  -Recommend continued wound care center care   -Rec BLE Xeroform and dry kerlix wraps twice daily to shin regions with elevation of LLE above level of the heart  -Low sodium diet  -Strict glycemic control  -Will call with further plan of care    I spent 20 minutes evaluating this patient and greater than 50% of the time was spent counseling, coordinator care and discussing the plan of care.  All questions were answered and patient stated understanding with agreement with the above treatment plan.    Mitch Chan MD [facility]  Vascular and Endovascular Surgery

## 2019-05-16 NOTE — LETTER
May 16, 2019        Rubén Davis MD  [address] [facility] [address]             [facility] Vascular Surgery  [address] [facility] [address]  [facility] [address]  Phone: [phone]  Fax: [phone]   Patient: Marvin Ray   MR Number: [id number]   YOB: 1960   Date of Visit: 5/16/2019       Dear Dr. Davis:    Thank you for referring Marvin Ray to me for evaluation. Below are the relevant portions of my assessment and plan of care.            If you have questions, please do not hesitate to call me. I look forward to following Marvin along with you.    Sincerely,      Micth Chan MD           CC  No Recipients

## 2019-05-17 ENCOUNTER — TELEPHONE (OUTPATIENT)
Dept: NEPHROLOGY | Facility: CLINIC | Age: 59
End: 2019-05-17

## 2019-05-17 DIAGNOSIS — N18.30 STAGE 3 CHRONIC KIDNEY DISEASE: Primary | ICD-10-CM

## 2019-05-17 NOTE — TELEPHONE ENCOUNTER
Called pt per dr umaña to schedule pt and inform him his results after clinic visit on may 1st. No answer on either phones number listed in pt chart left vm.

## 2019-05-17 NOTE — TELEPHONE ENCOUNTER
Talked with rhina about the message I left on the vm per dr umaña and pt is all schedule for next month, and coming to get 24hrs urine sometimes next week. But rhina has a lot of questions and concern for dr umaña about pt health and discussion  is making   ----- Message from Maryjane Downey sent at 5/17/2019  1:18 PM CDT -----  Contact:   Rhina  [phone]  Needs Advice/ Pt Sister     Reason for call:  An appt         Communication Preference:phone     Additional Information: returning the nurse phone call  in regards  to an appt

## 2019-05-19 LAB
LEFT ABI: 1.46
LEFT ANT TIBIAL SYS PSV: 80 CM/S
LEFT ARM BP: 105 MMHG
LEFT CFA PSV: 203 CM/S
LEFT DORSALIS PEDIS: 171 MMHG
LEFT EXTERNAL ILIAC PSV: 96 CM/S
LEFT PERONEAL SYS PSV: 147 CM/S
LEFT POPLITEAL PSV: 98 CM/S
LEFT POST TIBIAL SYS PSV: 45 CM/S
LEFT POSTERIOR TIBIAL: 60 MMHG
LEFT PROFUNDA SYS PSV: 65 CM/S
LEFT SUPER FEMORAL DIST SYS PSV: 82 CM/S
LEFT SUPER FEMORAL MID SYS PSV: 196 CM/S
LEFT SUPER FEMORAL OSTIAL SYS PSV: 133 CM/S
LEFT SUPER FEMORAL PROX SYS PSV: 132 CM/S
LEFT TIB/PER TRUNK SYS PSV: 181 CM/S
OHS CV LEFT LOWER EXTREMITY ABI (NO CALC): 1.46
OHS CV RIGHT ABI LOWER EXTREMITY (NO CALC): 0.84
RIGHT ABI: 2.17
RIGHT ANT TIBIAL SYS PSV: 29 CM/S
RIGHT ARM BP: 117 MMHG
RIGHT CFA PSV: 83 CM/S
RIGHT DORSALIS PEDIS: 254 MMHG
RIGHT EXTERNAL ILLIAC PSV: 75 CM/S
RIGHT PERONEAL SYS PSV: 91 CM/S
RIGHT POPLITEAL PSV: 93 CM/S
RIGHT POST TIBIAL SYS PSV: 43 CM/S
RIGHT POSTERIOR TIBIAL: 98 MMHG
RIGHT PROFUNDA SYS PSV: 82 CM/S
RIGHT SUPER FEMORAL DIST SYS PSV: 91 CM/S
RIGHT SUPER FEMORAL MID SYS PSV: 84 CM/S
RIGHT SUPER FEMORAL OSTIAL SYS PSV: 106 CM/S
RIGHT SUPER FEMORAL PROX SYS PSV: 121 CM/S
RIGHT TIB/PER TRUNK SYS PSV: 158 CM/S

## 2019-05-21 ENCOUNTER — HOSPITAL ENCOUNTER (OUTPATIENT)
Dept: WOUND CARE | Facility: HOSPITAL | Age: 59
Discharge: HOME | End: 2019-05-21
Attending: FAMILY MEDICINE
Payer: COMMERCIAL

## 2019-05-21 DIAGNOSIS — L97.529 TYPE 2 DIABETES MELLITUS WITH LEFT DIABETIC FOOT ULCER: ICD-10-CM

## 2019-05-21 DIAGNOSIS — I70.244 ATHEROSCLEROSIS OF NATIVE ARTERY OF LEFT LOWER EXTREMITY WITH ULCERATION OF MIDFOOT: Primary | ICD-10-CM

## 2019-05-21 DIAGNOSIS — E11.621 TYPE 2 DIABETES MELLITUS WITH LEFT DIABETIC FOOT ULCER: ICD-10-CM

## 2019-05-21 PROCEDURE — 99215 OFFICE O/P EST HI 40 MIN: CPT | Performed by: FAMILY MEDICINE

## 2019-05-21 PROCEDURE — 99214 OFFICE O/P EST MOD 30 MIN: CPT | Mod: ,,, | Performed by: FAMILY MEDICINE

## 2019-05-21 PROCEDURE — 99214 PR OFFICE/OUTPT VISIT, EST, LEVL IV, 30-39 MIN: ICD-10-PCS | Mod: ,,, | Performed by: FAMILY MEDICINE

## 2019-05-22 ENCOUNTER — TELEPHONE (OUTPATIENT)
Dept: PLASTIC SURGERY | Facility: CLINIC | Age: 59
End: 2019-05-22

## 2019-05-22 RX ORDER — TORSEMIDE 20 MG/1
TABLET ORAL
Qty: 120 TABLET | Refills: 0 | Status: SHIPPED | OUTPATIENT
Start: 2019-05-22 | End: 2019-06-13 | Stop reason: SDUPTHER

## 2019-05-22 NOTE — PROGRESS NOTES
HPI     Concerns About Ocular Health      Additional comments: HVF review              Comments     Patient here for HVF review. Patient c/o dry eyes and denies any other   complaints.    alphagan BID OU  cosopt (blue) BID OU    S/p CE w/ IOL OD (9/26/2018)  S/p CE w/ IOL OS (2012)  S/p baerveldt glaucoma implant OS (2012)  S/p ahmed glaucoma implant (2011)  DM  Epiretinal membrane OU  H/o cataracts OU  Pseudophakia OU  Neovascular glaucoma, severe stage OD  Neovascular glaucoma, mild stage OS  HTN  Macular edema OD  Proliferative retinopathy OD          Last edited by Helen Kovacs on 5/24/2019  3:05 PM. (History)          Assessment /Plan     For exam results, see Encounter Report.    Neovascular glaucoma of left eye, severe stage    Neovascular glaucoma, right eye, mild stage    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 edema, with long-term current use of insulin    Epiretinal membrane, both eyes    Diabetic macular edema of right eye with proliferative retinopathy associated with type 2 diabetes mellitus    Diabetic macular edema of left eye with proliferative retinopathy associated with type 2 diabetes mellitus    Pseudophakia    Rubeosis iridis, right    History of glaucoma tube shunt procedure        Pt has been followed in the retina and glaucoma clinics at [facility] for many years  Now is transferring to [facility] main campus (2016)   S/P PRP ou for PDR ou   S/P multiple avastin injections ou  + NVG os // ? POAG od   S/P ahmed os 2011  S/P phaco/IOL / baerveldt os 2012       1. NVG OS 2/2 PDR // ?? POAG od - on gtts    First HVF   ? 10/2014    First photos   6/2016   Treatment / Drops started   Years ago           Family history    ?        Glaucoma meds    cosopt ou / alphagan ou         H/O adverse rxn to glaucoma drops    ? Try and avoid PG's - PDR with ME and NVG os         LASERS    SLT - ? Os or OU // G6 laser os // PRP ou          GLAUCOMA SURGERIES    ahmed os // baerveldt os - both at [facility] // G6 laser os 2/15/2017        OTHER EYE SURGERIES    Phaco/IOL od 9/26/2018         CDR    0.4/0.9        Tbase    ??  (on gtts at [facility] from 2014 to 2016  ran - 12-19 od // 16-25 os )         Tmax    ?             Ttarget    ?             [facility]    [facility] 2 test 2016 to 2019  -24-2 ss  SAD/IAD od // 24-2 stim V gen dep - SAD / IAD os         Gonio    +3 od // +3 sup os with PAS T/N/I         CCT    449/540        OCT    2 test 2016 to 2018 - RNFL - dec S/I od  od // dec S/I, bord N  os        HRT    2 test 2017 to 2018 - MR - nl  od // dec TI/ bord NI  os        Disc photos    2016     - Ttoday    10/10  - Test done today    HVF / 24-2 ss od and 24-2 stim V os // DFE // photos      - OFF -  Xalatan OD due to macular edema  - pt reports good adherence  Cont  cosopt bid ou, brimonidine bid ou        2.  PDR s/p PRP OU (DM2), with CSME OU (and HTN)  - Encouraged good BS/BP/Cholesterol control    3. DME OU  OD>OS  S/p Avastin OD x 9, OS x 7  Will monitor OS for now given NVG and ERM      4. PSK OS  - Stable, CTM, RD precautions       5. ERM OS  Given NVG hx, monitor  - CTM    6. PC IOL OU    OD 9/26/2018 - pcb00 17.0   Os       Cont cosopt ou tid   Cont alphagan tid ou   Stay off  latanoprost - - 2/2 DME od     H/O  RUBEOSIS AT THE PUPIL OD AND 1 SMALL AREA OF ANGLE VIRI ING OD (post CE)    S/P more avastin od 10/9/2018 - [place] - rubeosis regressed    S/p fill in PRP od - 11/26/2018 - [place] - rubeosis regressed    S/p MORE AVASTIN OD 1/25/2019     VA good - BCVA - 20/40 - 11/30/2018    If IOP goes up OS  can re-start PG's os and / or repeat cyclo G6    5/24/2019 - pt has been very ill   + CHF   + PVD - had toes amputated left foot   -S/P angioplasty to open leg vessels   Out of hospital and at home - now - wound care continues     F/U with HRT - 4 months     I have seen and personally examined the patient.  I  agree with the findings, assessment and plan of the resident and/or fellow.     Perla Cortés MD

## 2019-05-22 NOTE — TELEPHONE ENCOUNTER
Spoke with pt and he confirmed that he would be here for upcoming appt on thursday 5/23/2019. Pt verbalized understanding of appt time and location.

## 2019-05-23 ENCOUNTER — OFFICE VISIT (OUTPATIENT)
Dept: PLASTIC SURGERY | Facility: CLINIC | Age: 59
End: 2019-05-23
Payer: COMMERCIAL

## 2019-05-23 VITALS
HEART RATE: 89 BPM | BODY MASS INDEX: 25.34 KG/M2 | HEIGHT: 70 IN | SYSTOLIC BLOOD PRESSURE: 100 MMHG | DIASTOLIC BLOOD PRESSURE: 61 MMHG | WEIGHT: 177 LBS

## 2019-05-23 DIAGNOSIS — M86.272 SUBACUTE OSTEOMYELITIS OF LEFT FOOT: Primary | ICD-10-CM

## 2019-05-23 PROCEDURE — 3078F DIAST BP <80 MM HG: CPT | Mod: CPTII,S$GLB,, | Performed by: SURGERY

## 2019-05-23 PROCEDURE — 3078F PR MOST RECENT DIASTOLIC BLOOD PRESSURE < 80 MM HG: ICD-10-PCS | Mod: CPTII,S$GLB,, | Performed by: SURGERY

## 2019-05-23 PROCEDURE — 3074F PR MOST RECENT SYSTOLIC BLOOD PRESSURE < 130 MM HG: ICD-10-PCS | Mod: CPTII,S$GLB,, | Performed by: SURGERY

## 2019-05-23 PROCEDURE — 99999 PR PBB SHADOW E&M-EST. PATIENT-LVL III: ICD-10-PCS | Mod: PBBFAC,,, | Performed by: SURGERY

## 2019-05-23 PROCEDURE — 3008F PR BODY MASS INDEX (BMI) DOCUMENTED: ICD-10-PCS | Mod: CPTII,S$GLB,, | Performed by: SURGERY

## 2019-05-23 PROCEDURE — 99213 PR OFFICE/OUTPT VISIT, EST, LEVL III, 20-29 MIN: ICD-10-PCS | Mod: S$GLB,,, | Performed by: SURGERY

## 2019-05-23 PROCEDURE — 99999 PR PBB SHADOW E&M-EST. PATIENT-LVL III: CPT | Mod: PBBFAC,,, | Performed by: SURGERY

## 2019-05-23 PROCEDURE — 99213 OFFICE O/P EST LOW 20 MIN: CPT | Mod: S$GLB,,, | Performed by: SURGERY

## 2019-05-23 PROCEDURE — 3008F BODY MASS INDEX DOCD: CPT | Mod: CPTII,S$GLB,, | Performed by: SURGERY

## 2019-05-23 PROCEDURE — 3074F SYST BP LT 130 MM HG: CPT | Mod: CPTII,S$GLB,, | Performed by: SURGERY

## 2019-05-24 ENCOUNTER — OFFICE VISIT (OUTPATIENT)
Dept: OPHTHALMOLOGY | Facility: CLINIC | Age: 59
End: 2019-05-24
Payer: COMMERCIAL

## 2019-05-24 ENCOUNTER — CLINICAL SUPPORT (OUTPATIENT)
Dept: OPHTHALMOLOGY | Facility: CLINIC | Age: 59
End: 2019-05-24
Payer: COMMERCIAL

## 2019-05-24 DIAGNOSIS — Z96.89 HISTORY OF GLAUCOMA TUBE SHUNT PROCEDURE: ICD-10-CM

## 2019-05-24 DIAGNOSIS — H40.52X3 NEOVASCULAR GLAUCOMA OF LEFT EYE, SEVERE STAGE: Primary | ICD-10-CM

## 2019-05-24 DIAGNOSIS — E11.3511 DIABETIC MACULAR EDEMA OF RIGHT EYE WITH PROLIFERATIVE RETINOPATHY ASSOCIATED WITH TYPE 2 DIABETES MELLITUS: ICD-10-CM

## 2019-05-24 DIAGNOSIS — H21.1X1 RUBEOSIS IRIDIS, RIGHT: ICD-10-CM

## 2019-05-24 DIAGNOSIS — S91.302D OPEN WOUND OF LEFT FOOT, SUBSEQUENT ENCOUNTER: Primary | ICD-10-CM

## 2019-05-24 DIAGNOSIS — Z96.1 PSEUDOPHAKIA: ICD-10-CM

## 2019-05-24 DIAGNOSIS — E11.3512 DIABETIC MACULAR EDEMA OF LEFT EYE WITH PROLIFERATIVE RETINOPATHY ASSOCIATED WITH TYPE 2 DIABETES MELLITUS: ICD-10-CM

## 2019-05-24 DIAGNOSIS — H35.373 EPIRETINAL MEMBRANE, BOTH EYES: ICD-10-CM

## 2019-05-24 DIAGNOSIS — H40.51X1 NEOVASCULAR GLAUCOMA, RIGHT EYE, MILD STAGE: ICD-10-CM

## 2019-05-24 DIAGNOSIS — H40.52X3 NEOVASCULAR GLAUCOMA OF LEFT EYE, SEVERE STAGE: ICD-10-CM

## 2019-05-24 PROCEDURE — 99999 PR PBB SHADOW E&M-EST. PATIENT-LVL II: CPT | Mod: PBBFAC,,, | Performed by: OPHTHALMOLOGY

## 2019-05-24 PROCEDURE — 92083 EXTENDED VISUAL FIELD XM: CPT | Mod: S$GLB,,, | Performed by: OPHTHALMOLOGY

## 2019-05-24 PROCEDURE — 92250 COLOR FUNDUS PHOTOGRAPHY - OU - BOTH EYES: ICD-10-PCS | Mod: S$GLB,,, | Performed by: OPHTHALMOLOGY

## 2019-05-24 PROCEDURE — 92014 PR EYE EXAM, EST PATIENT,COMPREHESV: ICD-10-PCS | Mod: S$GLB,,, | Performed by: OPHTHALMOLOGY

## 2019-05-24 PROCEDURE — 99999 PR PBB SHADOW E&M-EST. PATIENT-LVL II: ICD-10-PCS | Mod: PBBFAC,,, | Performed by: OPHTHALMOLOGY

## 2019-05-24 PROCEDURE — 92014 COMPRE OPH EXAM EST PT 1/>: CPT | Mod: S$GLB,,, | Performed by: OPHTHALMOLOGY

## 2019-05-24 PROCEDURE — 92083 HUMPHREY VISUAL FIELD - OU - BOTH EYES: ICD-10-PCS | Mod: S$GLB,,, | Performed by: OPHTHALMOLOGY

## 2019-05-24 PROCEDURE — 92250 FUNDUS PHOTOGRAPHY W/I&R: CPT | Mod: S$GLB,,, | Performed by: OPHTHALMOLOGY

## 2019-05-24 NOTE — PROGRESS NOTES
hvf done rel/fix/coop. Good ou/checked for latex allergy and asked patient none noted.-[facility]

## 2019-05-24 NOTE — PROGRESS NOTES
Yefri Montana - Plastic Surg [facility]  General Surgery  Progress Note    Subjective:     History of Present Illness:    Saw him on 3/18 to discuss reconstructive options, limb salvage.  He had strong preference for foot salvage.  Since that time, he has had angioplasty and new vascular studies.    4/30 Angiogram  Procedure:  1. US guided R CFA access  2. Aortogram with CO2  3. L femoral angiogram with CO2  4. L popliteal angiogram  5. L tibial angiogram   6. L peroneal angioplasty with a [id number] Greenwood balloon   7. L peroneal angioplasty with a [id number] Greenwood balloon  8. L AT angioplasty with a [id number] Greenwood balloon  9. L AT angioplasty with a 2.5x100 Greenwood balloon   10. 6fr Mynx closure R CFA  11. Moderate sedation    Per Dr. Chan's last note:  5/16/19: BLE with no HD significant stenosis, SIDRA 0.8/1.46, left ankle pressures 60 and 171      Interval History:     They return asking to discuss limb salvage options.  He is again accompanied by his sister.  His sister has extensive experience with wound vac therapy.  I was asked to evaluate the wound by Dr. Chan, because he says the wound looks  since his last vascular intervention.  He is offloading his foot, using it only for transfers.  Earlier this year he had an admission to have a parcentesis.  He is no longer taking antibiotics. He says his sugars are well controlled.         Current Outpatient Medications:     allopurinol (ZYLOPRIM) 300 MG tablet, Take 1 tablet (300 mg total) by mouth once daily., Disp: 30 tablet, Rfl: 3    aspirin 325 MG tablet, Take 325 mg by mouth once daily. , Disp: , Rfl:     bisacodyl (DULCOLAX) 5 mg EC tablet, Take 5 mg by mouth daily as needed for Constipation., Disp: , Rfl:     brimonidine 0.2% (ALPHAGAN) 0.2 % Drop, Place 1 drop into both eyes 2 (two) times daily., Disp: 10 mL, Rfl: 11    carvedilol (COREG) 3.125 MG tablet, Take 1 tablet (3.125 mg total) by mouth 2 (two) times daily., Disp: 60 tablet, Rfl: 11     "carvedilol (COREG) 6.25 MG tablet, , Disp: , Rfl: 11    coenzyme Q10 100 mg capsule, Take 100 mg by mouth every morning., Disp: , Rfl:     dorzolamide-timolol 2-0.5% (COSOPT) 22.3-6.8 mg/mL ophthalmic solution, Place 1 drop into both eyes 2 (two) times daily., Disp: 1 Bottle, Rfl: 11    ezetimibe (ZETIA) 10 mg tablet, TAKE 1 TABLET(10 MG) BY MOUTH EVERY DAY, Disp: 90 tablet, Rfl: 0    fish oil-omega-3 fatty acids 300-1,000 mg capsule, Take 1 capsule by mouth 2 (two) times daily., Disp: 60 capsule, Rfl: 3    furosemide (LASIX) 20 MG tablet, , Disp: , Rfl:     insulin aspart U-100 (NOVOLOG) 100 unit/mL InPn pen, Use on premeal readings: 150-200=+2, 201-250=+4; 251-300=+6; 301-350=+8, over 350=+10 units, Disp: 2.7 mL, Rfl: 11    insulin degludec-liraglutide (XULTOPHY 100/3.6) 100 unit-3.6 mg /mL (3 mL) InPn, Inject 42 Units into the skin once daily., Disp: 5 Syringe, Rfl: 5    losartan (COZAAR) 25 MG tablet, Take 1 tablet (25 mg total) by mouth once daily., Disp: 90 tablet, Rfl: 3    metOLazone (ZAROXOLYN) 5 MG tablet, Take 1 tablet (5 mg total) by mouth every Monday and Thursday. 30 minutes before lasix., Disp: 30 tablet, Rfl: 2    miconazole NITRATE 2 % (MICOTIN) 2 % top powder, Apply topically 2 (two) times daily., Disp: , Rfl: 0    MULTIVIT,THER IRON,CA,FA & MIN (MULTIVITAMIN) Tab, Take 1 tablet by mouth every morning. , Disp: , Rfl:     oxyCODONE-acetaminophen (PERCOCET) 5-325 mg per tablet, Take 1 tablet by mouth every 6 (six) hours as needed for Pain., Disp: 45 tablet, Rfl: 0    pen needle, diabetic 31 gauge x 1/4" Ndle, Use as directed, Disp: 100 each, Rfl: 11    SANTYL ointment, FEDERICO TOPICALLY ONCE D, Disp: , Rfl: 0    torsemide (DEMADEX) 20 MG Tab, TAKE 4 TABLETS BY MOUTH TWICE DAILY, Disp: 120 tablet, Rfl: 0  No current facility-administered medications for this visit.     Facility-Administered Medications Ordered in Other Visits:     lactated ringers infusion, , Intravenous, Continuous, Tam" JA Reynolds MD    lidocaine (PF) 10 mg/ml (1%) injection 10 mg, 1 mL, Intradermal, Once, Tam Reynolds MD       Review of patient's allergies indicates:   Allergen Reactions    Statins-hmg-coa reductase inhibitors      Generalized Pain    Onglyza [saxagliptin]     Penicillins Rash     Objective:     Vital Signs (Most Recent):  Pulse: 89 (05/23/19 0950)  BP: 100/61 (05/23/19 0950) Vital Signs (24h Range):  .     Weight: 80.3 kg (177 lb)  Body mass index is 25.4 kg/m².    Improved granulation tissue left TMA stump  Interval contraction of wounds with epithelialization of wound edges  Has devitalized soft tissue over calcaneus, at TMA stump, over plantar surface of his foot      Physical Exam   Constitutional: He is oriented to person, place, and time. He appears well-developed and well-nourished. No distress.   HENT:   Head: Normocephalic and atraumatic.   Right Ear: Hearing and external ear normal.   Left Ear: Hearing and external ear normal.   Nose: Nose normal.   Eyes: Pupils are equal, round, and reactive to light. Conjunctivae are normal.   Cardiovascular: Normal rate and regular rhythm.   Pulmonary/Chest: Effort normal and breath sounds normal.   Normal respiratory effort.    Abdominal: Soft. He exhibits no distension.   Musculoskeletal: He exhibits edema.        Left foot: There is decreased range of motion and deformity.   Feet:   Left Foot: amputated  Protective Sensation: 1 site tested. 1 site sensed.  Skin Integrity: Positive for skin breakdown and dry skin.   Neurological: He is alert and oriented to person, place, and time.   Skin: Skin is warm and dry. No rash noted. No pallor.   Psychiatric: He has a normal mood and affect. His speech is normal and behavior is normal.       Significant Labs:    Lab Results   Component Value Date    HGBA1C 6.7 (H) 03/11/2019     Lab Results   Component Value Date    WBC 6.77 04/23/2019    HGB 9.1 (L) 04/23/2019    HCT 28.8 (L) 04/23/2019    MCV 84 04/23/2019    PLT  357 (H) 04/23/2019          Coagulation:   Microbiology Results (last 7 days)     ** No results found for the last 168 hours. **          Microbiology Results (last 7 days)     ** No results found for the last 168 hours. **        Significant Diagnostics:  I have personally reviewed the results of the vascular reports listed above      Assessment/Plan:     Diagnoses:  1.  Left lower extremity atherosclerosis, left foot wound with necrosis of   underlying skin and subcutaneous tissue.  Now with decreased devitalized tissue and intervally improved granulation tissue  2.  Hypertension.  3.  Hyperlipidemia.  4.  CKD stage III.  5.  Diabetes mellitus.  6.  Liver disease, status post paracentesis.  7.  Status post left open transmetatarsal amputation.  8.  Status post angioplasty revascularization of left lower extremity     Plan:  - Once devitalized tissue is removed, would transition to a wound vac in preparation for skin grafting  - Would not place a biologic dressing at this time  - Continue ID follow up.  In my opinion, antibiotics are not indicated at this time.  Could send new ESR, CRP for baseline.  No infection in left leg in my opinion at this point  - When wound bed is covered with granulation tissue, would proceed with skin grafting.      I discussed the goals of limb salvage with him.  Skin grafting would provide coverage of the wound, decrease risk of infection but would not provide a durable surface for high demand stump use.  It would however potentially limit the need for complex wound care and serve as an adequate surface for transfer.  I explained skin graft donor sites.  He is not a candidate for free tissue transfer.      Plastic & Reconstructive Surgery  [facility]  c/o Leonidas Diaz M.D.  [facility]  [address]  [address] [place] [address]    Work [phone]  Toll free [phone]  If no answer [phone]

## 2019-05-28 ENCOUNTER — LAB VISIT (OUTPATIENT)
Dept: LAB | Facility: HOSPITAL | Age: 59
End: 2019-05-28
Attending: INTERNAL MEDICINE
Payer: COMMERCIAL

## 2019-05-28 DIAGNOSIS — E11.22 CKD STAGE 3 DUE TO TYPE 2 DIABETES MELLITUS: ICD-10-CM

## 2019-05-28 DIAGNOSIS — N18.30 STAGE 3 CHRONIC KIDNEY DISEASE: ICD-10-CM

## 2019-05-28 DIAGNOSIS — R79.89 AZOTEMIA: ICD-10-CM

## 2019-05-28 DIAGNOSIS — N18.30 CKD STAGE 3 DUE TO TYPE 2 DIABETES MELLITUS: ICD-10-CM

## 2019-05-28 PROCEDURE — 82575 CREATININE CLEARANCE TEST: CPT

## 2019-05-31 ENCOUNTER — TELEPHONE (OUTPATIENT)
Dept: FAMILY MEDICINE | Facility: CLINIC | Age: 59
End: 2019-05-31

## 2019-05-31 ENCOUNTER — HOSPITAL ENCOUNTER (OUTPATIENT)
Dept: PREADMISSION TESTING | Facility: HOSPITAL | Age: 59
Discharge: HOME | End: 2019-05-31
Attending: SURGERY
Payer: COMMERCIAL

## 2019-05-31 VITALS
BODY MASS INDEX: 25.16 KG/M2 | OXYGEN SATURATION: 99 % | TEMPERATURE: 97 F | DIASTOLIC BLOOD PRESSURE: 57 MMHG | HEART RATE: 77 BPM | RESPIRATION RATE: 16 BRPM | SYSTOLIC BLOOD PRESSURE: 99 MMHG | WEIGHT: 166 LBS | HEIGHT: 68 IN

## 2019-05-31 DIAGNOSIS — S91.302D OPEN WOUND OF LEFT FOOT, SUBSEQUENT ENCOUNTER: ICD-10-CM

## 2019-05-31 LAB
ANION GAP SERPL CALC-SCNC: 10 MMOL/L (ref 8–16)
APTT BLDCRRT: 28.3 SEC (ref 21–32)
BASOPHILS # BLD AUTO: 0.03 K/UL (ref 0–0.2)
BASOPHILS NFR BLD: 0.5 % (ref 0–1.9)
BUN SERPL-MCNC: 64 MG/DL (ref 8–23)
CALCIUM SERPL-MCNC: 9.3 MG/DL (ref 8.7–10.5)
CHLORIDE SERPL-SCNC: 92 MMOL/L (ref 95–110)
CO2 SERPL-SCNC: 31 MMOL/L (ref 23–29)
CREAT SERPL-MCNC: 1.3 MG/DL (ref 0.5–1.4)
DIFFERENTIAL METHOD: ABNORMAL
EOSINOPHIL # BLD AUTO: 0.4 K/UL (ref 0–0.5)
EOSINOPHIL NFR BLD: 6.4 % (ref 0–8)
ERYTHROCYTE [DISTWIDTH] IN BLOOD BY AUTOMATED COUNT: 20.8 % (ref 11.5–14.5)
EST. GFR  (AFRICAN AMERICAN): >60 ML/MIN/1.73 M^2
EST. GFR  (NON AFRICAN AMERICAN): 59 ML/MIN/1.73 M^2
GLUCOSE SERPL-MCNC: 135 MG/DL (ref 70–110)
HCT VFR BLD AUTO: 29.1 % (ref 40–54)
HGB BLD-MCNC: 9.3 G/DL (ref 14–18)
INR PPP: 1.1 (ref 0.8–1.2)
LYMPHOCYTES # BLD AUTO: 0.8 K/UL (ref 1–4.8)
LYMPHOCYTES NFR BLD: 12 % (ref 18–48)
MAGNESIUM SERPL-MCNC: 2.1 MG/DL (ref 1.6–2.6)
MCH RBC QN AUTO: 27.6 PG (ref 27–31)
MCHC RBC AUTO-ENTMCNC: 32 G/DL (ref 32–36)
MCV RBC AUTO: 86 FL (ref 82–98)
MONOCYTES # BLD AUTO: 0.8 K/UL (ref 0.3–1)
MONOCYTES NFR BLD: 12.1 % (ref 4–15)
NEUTROPHILS # BLD AUTO: 4.6 K/UL (ref 1.8–7.7)
NEUTROPHILS NFR BLD: 69 % (ref 38–73)
PHOSPHATE SERPL-MCNC: 3.8 MG/DL (ref 2.7–4.5)
PLATELET # BLD AUTO: 303 K/UL (ref 150–350)
PMV BLD AUTO: 8.8 FL (ref 9.2–12.9)
POTASSIUM SERPL-SCNC: 2.7 MMOL/L (ref 3.5–5.1)
PROTHROMBIN TIME: 11.2 SEC (ref 9–12.5)
RBC # BLD AUTO: 3.37 M/UL (ref 4.6–6.2)
SODIUM SERPL-SCNC: 133 MMOL/L (ref 136–145)
WBC # BLD AUTO: 6.61 K/UL (ref 3.9–12.7)

## 2019-05-31 PROCEDURE — 84100 ASSAY OF PHOSPHORUS: CPT

## 2019-05-31 PROCEDURE — 85730 THROMBOPLASTIN TIME PARTIAL: CPT

## 2019-05-31 PROCEDURE — 85025 COMPLETE CBC W/AUTO DIFF WBC: CPT

## 2019-05-31 PROCEDURE — 85610 PROTHROMBIN TIME: CPT

## 2019-05-31 PROCEDURE — 36415 COLL VENOUS BLD VENIPUNCTURE: CPT

## 2019-05-31 PROCEDURE — 83735 ASSAY OF MAGNESIUM: CPT

## 2019-05-31 PROCEDURE — 80048 BASIC METABOLIC PNL TOTAL CA: CPT

## 2019-05-31 RX ORDER — LORATADINE 10 MG/1
10 TABLET ORAL DAILY PRN
COMMUNITY
End: 2019-07-29

## 2019-05-31 NOTE — DISCHARGE INSTRUCTIONS
Your surgery is scheduled for _Wednesday June 5, 2019___________________.    Call [phone] between 2 p.m. and 5 p.m. on   _Tuesday _ to find out your arrival time for the day of your surgery.      Please report to SAME DAY SURGERY UNIT on the 2nd FLOOR at _______ a.m.  Use front door entrance. The doors open at 0530 am.          INSTRUCTIONS IMPORTANT!!!  ¨ Do not eat or drink after 12 midnight-including water. OK to brush teeth, no   gum, candy or mints!    ¨ Take only these medicines with a small swallow of water-morning of surgery.  Take Carvedilol and Claritin with water    _x___  Prep instructions:   SHOWER     ____  Please shower using Hibiclens soap the night before AND  the morning of your surgery/procedure. Do not use Hibiclens on your face or genitals     ____  No shaving of procedural area at least 4-5 days before surgery due to increased risk of skin irritation and/or possible infection.    _x___  No powder, lotions or creams to your body.  _x___  You may wear only deodorant on the day of surgery.  _x___  Please remove all jewelry, including piercings and leave at home.  _x___  No money or valuables needed. Please leave at home.  You may bring your cell phone.  ____  Please bring any documents given by your doctor.  __x__  If going home the same day, arrange for a ride home. You will not be able to   drive if Anesthesia was used.  ____  Children under 18 years require a parent / guardian present the entire time   they are in surgery / recovery.  _x___  Wear loose fitting clothing. Allow for dressings, bandages.  _x___  Stop Aspirin, Ibuprofen, Motrin and Aleve at least -5 days before  surgery, unless otherwise instructed by your doctor, or the nurse.      x        You MAY use Tylenol/acetaminophen until day of surgery.  _x___  If you take diabetic medication, do not take am of surgery unless instructed by Doctor.  _x___  Call MD for temperature above 101 degrees.        _x___ Stop taking any Fish Oil  supplement or any Vitamins that contain Vitamin E at least 5 days prior to surgery.          I have read or had read and explained to me, and understand the above information.  Additional comments or instructions:Please call   [phone] if you have any questions regarding the instructions above.

## 2019-05-31 NOTE — TELEPHONE ENCOUNTER
----- Message from Radu Coronel sent at 5/31/2019 11:57 AM CDT -----  Contact: Chloé putnam/[phone]  Type: Patient Call Back    Who called:Chloé putnam    What is the request in detail:Chloé would like to speak to the staff regarding the patients potassium levels.    Would the patient rather a call back or a response via [facility]? Call back    Best call back number:[phone]    Thank you

## 2019-06-02 LAB
CREAT CL/1.73 SQ M 12H UR+SERPL-ARVRAT: 34 ML/MIN (ref 70–110)
CREAT SERPL-MCNC: 1.3 MG/DL (ref 0.5–1.4)
CREAT UR-MCNC: 22.9 MG/DL (ref 23–375)
CREATININE, URINE (MG/SPEC): 629.8 MG/SPEC
URINE COLLECTION DURATION: 24 HR
URINE VOLUME: 2750 ML

## 2019-06-03 ENCOUNTER — TELEPHONE (OUTPATIENT)
Dept: FAMILY MEDICINE | Facility: CLINIC | Age: 59
End: 2019-06-03

## 2019-06-03 NOTE — TELEPHONE ENCOUNTER
----- Message from Luna Damon sent at 6/3/2019  1:33 PM CDT -----  Contact: Chloé   [phone]  Type: Patient Call Back    Who called: sister Chloé     What is the request in detail: Regarding pt scheduled for surgery on 6-5-19 and his blood levels, she said that she did put in a message already     Best call back number: [phone]    Thanks......REAL

## 2019-06-04 ENCOUNTER — HOSPITAL ENCOUNTER (OUTPATIENT)
Dept: WOUND CARE | Facility: HOSPITAL | Age: 59
Discharge: HOME | End: 2019-06-04
Attending: FAMILY MEDICINE
Payer: COMMERCIAL

## 2019-06-04 ENCOUNTER — TELEPHONE (OUTPATIENT)
Dept: FAMILY MEDICINE | Facility: CLINIC | Age: 59
End: 2019-06-04

## 2019-06-04 ENCOUNTER — LAB VISIT (OUTPATIENT)
Dept: LAB | Facility: HOSPITAL | Age: 59
End: 2019-06-04
Attending: SURGERY
Payer: COMMERCIAL

## 2019-06-04 DIAGNOSIS — I73.9 PVD (PERIPHERAL VASCULAR DISEASE): ICD-10-CM

## 2019-06-04 DIAGNOSIS — L97.522 NON-PRESSURE CHRONIC ULCER OF OTHER PART OF LEFT FOOT WITH FAT LAYER EXPOSED: Primary | ICD-10-CM

## 2019-06-04 DIAGNOSIS — I73.9 PVD (PERIPHERAL VASCULAR DISEASE): Primary | ICD-10-CM

## 2019-06-04 DIAGNOSIS — E87.6 HYPOKALEMIA: ICD-10-CM

## 2019-06-04 LAB
ANION GAP SERPL CALC-SCNC: 7 MMOL/L (ref 8–16)
BUN SERPL-MCNC: 63 MG/DL (ref 8–23)
CALCIUM SERPL-MCNC: 9.1 MG/DL (ref 8.7–10.5)
CHLORIDE SERPL-SCNC: 97 MMOL/L (ref 95–110)
CO2 SERPL-SCNC: 31 MMOL/L (ref 23–29)
CREAT SERPL-MCNC: 1.2 MG/DL (ref 0.5–1.4)
EST. GFR  (AFRICAN AMERICAN): >60 ML/MIN/1.73 M^2
EST. GFR  (NON AFRICAN AMERICAN): >60 ML/MIN/1.73 M^2
GLUCOSE SERPL-MCNC: 149 MG/DL (ref 70–110)
POTASSIUM SERPL-SCNC: 3.3 MMOL/L (ref 3.5–5.1)
SODIUM SERPL-SCNC: 135 MMOL/L (ref 136–145)

## 2019-06-04 PROCEDURE — 99214 OFFICE O/P EST MOD 30 MIN: CPT | Mod: ,,, | Performed by: FAMILY MEDICINE

## 2019-06-04 PROCEDURE — 80048 BASIC METABOLIC PNL TOTAL CA: CPT

## 2019-06-04 PROCEDURE — 36415 COLL VENOUS BLD VENIPUNCTURE: CPT

## 2019-06-04 PROCEDURE — 99215 OFFICE O/P EST HI 40 MIN: CPT | Performed by: FAMILY MEDICINE

## 2019-06-04 PROCEDURE — 99214 PR OFFICE/OUTPT VISIT, EST, LEVL IV, 30-39 MIN: ICD-10-PCS | Mod: ,,, | Performed by: FAMILY MEDICINE

## 2019-06-04 RX ORDER — POTASSIUM CHLORIDE 20 MEQ/1
20 TABLET, EXTENDED RELEASE ORAL DAILY
Qty: 30 TABLET | Refills: 0 | Status: SHIPPED | OUTPATIENT
Start: 2019-06-04 | End: 2019-07-04

## 2019-06-04 NOTE — TELEPHONE ENCOUNTER
----- Message from Anum Mata sent at 6/4/2019 10:34 AM CDT -----  Contact: Self   Type:  Patient Returning Call    Who Called: Self     Who Left Message for Patient: Soila     Does the patient know what this is regarding?: no     Would the patient rather a call back or a response via [facility]?  Call     Best Call Back Number: [phone]

## 2019-06-04 NOTE — TELEPHONE ENCOUNTER
I spoke to pt sister and she is upset that no one responded to their message on Friday regarding Potassium levels. Pt reports that they have Wound Care appointment today and they will discuss it with Dr. Davis. I apologized for the inconvenience.

## 2019-06-05 ENCOUNTER — TELEPHONE (OUTPATIENT)
Dept: PLASTIC SURGERY | Facility: CLINIC | Age: 59
End: 2019-06-05

## 2019-06-05 ENCOUNTER — ANESTHESIA EVENT (OUTPATIENT)
Dept: SURGERY | Facility: HOSPITAL | Age: 59
End: 2019-06-05
Payer: COMMERCIAL

## 2019-06-05 ENCOUNTER — ANESTHESIA (OUTPATIENT)
Dept: SURGERY | Facility: HOSPITAL | Age: 59
End: 2019-06-05
Payer: COMMERCIAL

## 2019-06-05 ENCOUNTER — HOSPITAL ENCOUNTER (OUTPATIENT)
Facility: HOSPITAL | Age: 59
Discharge: HOME | End: 2019-06-05
Attending: SURGERY | Admitting: SURGERY
Payer: COMMERCIAL

## 2019-06-05 VITALS
OXYGEN SATURATION: 98 % | DIASTOLIC BLOOD PRESSURE: 60 MMHG | BODY MASS INDEX: 25.13 KG/M2 | HEART RATE: 76 BPM | RESPIRATION RATE: 15 BRPM | HEIGHT: 68 IN | WEIGHT: 165.81 LBS | SYSTOLIC BLOOD PRESSURE: 102 MMHG | TEMPERATURE: 98 F

## 2019-06-05 DIAGNOSIS — S91.302D OPEN WOUND OF FOOT, LEFT, SUBSEQUENT ENCOUNTER: Primary | ICD-10-CM

## 2019-06-05 DIAGNOSIS — S91.302D OPEN WOUND OF LEFT FOOT, SUBSEQUENT ENCOUNTER: ICD-10-CM

## 2019-06-05 PROCEDURE — D9220A PRA ANESTHESIA: ICD-10-PCS | Mod: CRNA,,, | Performed by: NURSE ANESTHETIST, CERTIFIED REGISTERED

## 2019-06-05 PROCEDURE — 36000705 HC OR TIME LEV I EA ADD 15 MIN: Performed by: SURGERY

## 2019-06-05 PROCEDURE — 25000003 PHARM REV CODE 250: Performed by: SURGERY

## 2019-06-05 PROCEDURE — 82962 GLUCOSE BLOOD TEST: CPT | Performed by: SURGERY

## 2019-06-05 PROCEDURE — S0077 INJECTION, CLINDAMYCIN PHOSP: HCPCS | Performed by: SURGERY

## 2019-06-05 PROCEDURE — 25000003 PHARM REV CODE 250: Performed by: ANESTHESIOLOGY

## 2019-06-05 PROCEDURE — 11047: ICD-10-PCS | Mod: ,,, | Performed by: SURGERY

## 2019-06-05 PROCEDURE — D9220A PRA ANESTHESIA: Mod: ANES,,, | Performed by: ANESTHESIOLOGY

## 2019-06-05 PROCEDURE — 37000008 HC ANESTHESIA 1ST 15 MINUTES: Performed by: SURGERY

## 2019-06-05 PROCEDURE — 63600175 PHARM REV CODE 636 W HCPCS: Performed by: NURSE ANESTHETIST, CERTIFIED REGISTERED

## 2019-06-05 PROCEDURE — 71000015 HC POSTOP RECOV 1ST HR: Performed by: SURGERY

## 2019-06-05 PROCEDURE — 11047 DBRDMT BONE EACH ADDL: CPT | Mod: ,,, | Performed by: SURGERY

## 2019-06-05 PROCEDURE — 36000707: Performed by: SURGERY

## 2019-06-05 PROCEDURE — 37000009 HC ANESTHESIA EA ADD 15 MINS: Performed by: SURGERY

## 2019-06-05 PROCEDURE — 71000033 HC RECOVERY, INTIAL HOUR: Performed by: SURGERY

## 2019-06-05 PROCEDURE — 36000704 HC OR TIME LEV I 1ST 15 MIN: Performed by: SURGERY

## 2019-06-05 PROCEDURE — 11044 PR DEBRIDEMENT, SKIN, SUB-Q TISSUE,MUSCLE,BONE,=<20 SQ CM: ICD-10-PCS | Mod: ,,, | Performed by: SURGERY

## 2019-06-05 PROCEDURE — 11044 DBRDMT BONE 1ST 20 SQ CM/<: CPT | Mod: ,,, | Performed by: SURGERY

## 2019-06-05 PROCEDURE — 36000706: Performed by: SURGERY

## 2019-06-05 PROCEDURE — D9220A PRA ANESTHESIA: Mod: CRNA,,, | Performed by: NURSE ANESTHETIST, CERTIFIED REGISTERED

## 2019-06-05 PROCEDURE — D9220A PRA ANESTHESIA: ICD-10-PCS | Mod: ANES,,, | Performed by: ANESTHESIOLOGY

## 2019-06-05 RX ORDER — MIDAZOLAM HYDROCHLORIDE 1 MG/ML
INJECTION, SOLUTION INTRAMUSCULAR; INTRAVENOUS
Status: DISCONTINUED | OUTPATIENT
Start: 2019-06-05 | End: 2019-06-05

## 2019-06-05 RX ORDER — FENTANYL CITRATE 50 UG/ML
25 INJECTION, SOLUTION INTRAMUSCULAR; INTRAVENOUS EVERY 5 MIN PRN
Status: DISCONTINUED | OUTPATIENT
Start: 2019-06-05 | End: 2019-06-05 | Stop reason: HOSPADM

## 2019-06-05 RX ORDER — BACITRACIN 50000 [IU]/1
INJECTION, POWDER, FOR SOLUTION INTRAMUSCULAR
Status: DISCONTINUED | OUTPATIENT
Start: 2019-06-05 | End: 2019-06-05 | Stop reason: HOSPADM

## 2019-06-05 RX ORDER — CLINDAMYCIN PHOSPHATE 900 MG/50ML
900 INJECTION, SOLUTION INTRAVENOUS
Status: DISCONTINUED | OUTPATIENT
Start: 2019-06-05 | End: 2021-02-05

## 2019-06-05 RX ORDER — OXYCODONE AND ACETAMINOPHEN 5; 325 MG/1; MG/1
1 TABLET ORAL ONCE AS NEEDED
Status: COMPLETED | OUTPATIENT
Start: 2019-06-05 | End: 2019-06-05

## 2019-06-05 RX ORDER — OXYCODONE AND ACETAMINOPHEN 5; 325 MG/1; MG/1
1 TABLET ORAL EVERY 6 HOURS PRN
Qty: 15 TABLET | Refills: 0 | Status: SHIPPED | OUTPATIENT
Start: 2019-06-05 | End: 2019-06-05

## 2019-06-05 RX ORDER — ONDANSETRON 2 MG/ML
4 INJECTION INTRAMUSCULAR; INTRAVENOUS DAILY PRN
Status: DISCONTINUED | OUTPATIENT
Start: 2019-06-05 | End: 2019-06-05 | Stop reason: HOSPADM

## 2019-06-05 RX ORDER — OXYCODONE AND ACETAMINOPHEN 5; 325 MG/1; MG/1
1 TABLET ORAL EVERY 6 HOURS PRN
Qty: 15 TABLET | Refills: 0 | Status: SHIPPED | OUTPATIENT
Start: 2019-06-05 | End: 2019-06-19

## 2019-06-05 RX ORDER — SODIUM CHLORIDE 0.9 % (FLUSH) 0.9 %
10 SYRINGE (ML) INJECTION
Status: DISCONTINUED | OUTPATIENT
Start: 2019-06-05 | End: 2019-06-05 | Stop reason: HOSPADM

## 2019-06-05 RX ORDER — SODIUM CHLORIDE, SODIUM LACTATE, POTASSIUM CHLORIDE, CALCIUM CHLORIDE 600; 310; 30; 20 MG/100ML; MG/100ML; MG/100ML; MG/100ML
INJECTION, SOLUTION INTRAVENOUS CONTINUOUS
Status: DISCONTINUED | OUTPATIENT
Start: 2019-06-05 | End: 2019-06-05 | Stop reason: HOSPADM

## 2019-06-05 RX ORDER — LIDOCAINE HYDROCHLORIDE 10 MG/ML
1 INJECTION, SOLUTION EPIDURAL; INFILTRATION; INTRACAUDAL; PERINEURAL ONCE
Status: DISCONTINUED | OUTPATIENT
Start: 2019-06-05 | End: 2019-06-05 | Stop reason: HOSPADM

## 2019-06-05 RX ADMIN — SODIUM CHLORIDE, SODIUM LACTATE, POTASSIUM CHLORIDE, AND CALCIUM CHLORIDE: .6; .31; .03; .02 INJECTION, SOLUTION INTRAVENOUS at 11:06

## 2019-06-05 RX ADMIN — OXYCODONE HYDROCHLORIDE AND ACETAMINOPHEN 1 TABLET: 5; 325 TABLET ORAL at 04:06

## 2019-06-05 RX ADMIN — CLINDAMYCIN IN 5 PERCENT DEXTROSE 900 MG: 18 INJECTION, SOLUTION INTRAVENOUS at 02:06

## 2019-06-05 RX ADMIN — MIDAZOLAM HYDROCHLORIDE 2 MG: 1 INJECTION, SOLUTION INTRAMUSCULAR; INTRAVENOUS at 01:06

## 2019-06-05 NOTE — INTERVAL H&P NOTE
The patient has been examined and the H&P has been reviewed:    I concur with the findings and no changes have occurred since H&P was written.    Anesthesia/Surgery risks, benefits and alternative options discussed and understood by patient/family.          Active Hospital Problems    Diagnosis  POA    Open wound of foot, left, subsequent encounter [S90.494Y]  Not Applicable      Resolved Hospital Problems   No resolved problems to display.

## 2019-06-05 NOTE — BRIEF OP NOTE
[facility]  Brief Operative Note     SUMMARY     Surgery Date: 6/5/2019     Surgeon(s) and Role:     * Mitch Chan MD - Primary    Assisting Surgeon: None    Pre-op Diagnosis:  Open wound of left foot, subsequent encounter [S91.302D]    Post-op Diagnosis:  Post-Op Diagnosis Codes:     * Open wound of left foot, subsequent encounter [S91.302D]    Procedure(s) (LRB):  LEFT FOOT DEBRIDEMENT, WASHOUT AND ALL OTHER INDICATED PROCEDURES (Left)    Anesthesia: Monitor Anesthesia Care    Description of the findings of the procedure: granulating wound with fibrinous tissue and adipose tissue present    Findings/Key Components: healthy bleeding tissue after debridement, no deep infection    Estimated Blood Loss: 10cc         Specimens:   Specimen (12h ago, onward)    None          Discharge Note    SUMMARY     Admit Date: 6/5/2019    Discharge Date and Time:  06/05/2019 2:57 PM    Hospital Course (synopsis of major diagnoses, care, treatment, and services provided during the course of the hospital stay): No new issues or acute events postoperatively.        Final Diagnosis: Post-Op Diagnosis Codes:     * Open wound of left foot, subsequent encounter [S91.302D]    Disposition: Home or Self Care    Follow Up/Patient Instructions: Resume regular diet, follow-up with Plastic Surgery, resume regular activity in 2-3 days.    Resume medications per post-procedure med reconciliation.      Medications:  Reconciled Home Medications:      Medication List      ASK your doctor about these medications    allopurinol 300 MG tablet  Commonly known as:  ZYLOPRIM  Take 1 tablet (300 mg total) by mouth once daily.     aspirin 325 MG tablet  Take 325 mg by mouth once daily.     bisacodyl 5 mg EC tablet  Commonly known as:  DULCOLAX  Take 5 mg by mouth daily as needed for Constipation.     brimonidine 0.2% 0.2 % Drop  Commonly known as:  ALPHAGAN  Place 1 drop into both eyes 2 (two) times daily.     * carvedilol 3.125  "MG tablet  Commonly known as:  COREG  Take 1 tablet (3.125 mg total) by mouth 2 (two) times daily.     * carvedilol 6.25 MG tablet  Commonly known as:  COREG     coenzyme Q10 100 mg capsule  Take 100 mg by mouth every morning.     dorzolamide-timolol 2-0.5% 22.3-6.8 mg/mL ophthalmic solution  Commonly known as:  COSOPT  Place 1 drop into both eyes 2 (two) times daily.     ezetimibe 10 mg tablet  Commonly known as:  ZETIA  TAKE 1 TABLET(10 MG) BY MOUTH EVERY DAY     fish oil-omega-3 fatty acids 300-1,000 mg capsule  Take 1 capsule by mouth 2 (two) times daily.     furosemide 20 MG tablet  Commonly known as:  LASIX     insulin aspart U-100 100 unit/mL (3 mL) Inpn pen  Commonly known as:  NovoLOG  Use on premeal readings: 150-200=+2, 201-250=+4; 251-300=+6; 301-350=+8, over 350=+10 units     insulin degludec-liraglutide 100 unit-3.6 mg /mL (3 mL) Inpn  Commonly known as:  XULTOPHY 100/3.6  Inject 42 Units into the skin once daily.     loratadine 10 mg tablet  Commonly known as:  CLARITIN  Take 10 mg by mouth daily as needed for Allergies.     losartan 25 MG tablet  Commonly known as:  COZAAR  Take 1 tablet (25 mg total) by mouth once daily.     metOLazone 5 MG tablet  Commonly known as:  ZAROXOLYN  Take 1 tablet (5 mg total) by mouth every Monday and Thursday. 30 minutes before lasix.     miconazole NITRATE 2 % 2 % top powder  Commonly known as:  MICOTIN  Apply topically 2 (two) times daily.     multivitamin Tab  Take 1 tablet by mouth every morning.     oxyCODONE-acetaminophen 5-325 mg per tablet  Commonly known as:  PERCOCET  Take 1 tablet by mouth every 6 (six) hours as needed for Pain.     pen needle, diabetic 31 gauge x 1/4" Ndle  Use as directed     potassium chloride SA 20 MEQ tablet  Commonly known as:  K-DUR,KLOR-CON  Take 1 tablet (20 mEq total) by mouth once daily.     SANTYL ointment  Generic drug:  collagenase  FEDERICO TOPICALLY ONCE D     torsemide 20 MG Tab  Commonly known as:  DEMADEX  TAKE 4 TABLETS BY " MOUTH TWICE DAILY         * This list has 2 medication(s) that are the same as other medications prescribed for you. Read the directions carefully, and ask your doctor or other care provider to review them with you.              No discharge procedures on file.

## 2019-06-05 NOTE — TRANSFER OF CARE
"Anesthesia Transfer of Care Note    Patient: Marvin Ray    Procedure(s) Performed: Procedure(s) (LRB):  LEFT FOOT DEBRIDEMENT, WASHOUT AND ALL OTHER INDICATED PROCEDURES (Left)    Patient location: PACU    Anesthesia Type: MAC    Transport from OR: Transported from OR on room air with adequate spontaneous ventilation    Post pain: adequate analgesia    Post assessment: no apparent anesthetic complications    Post vital signs: stable    Level of consciousness: awake, alert and oriented    Nausea/Vomiting: no nausea/vomiting    Complications: none    Transfer of care protocol was followed      Last vitals:   Visit Vitals  /75 (BP Location: Right arm, Patient Position: Lying)   Pulse 77   Temp 36.4 °C (97.5 °F) (Oral)   Resp 14   Ht 5' 8" (1.727 m)   Wt 75.2 kg (165 lb 12.6 oz)   SpO2 99%   BMI 25.21 kg/m²     "

## 2019-06-05 NOTE — DISCHARGE INSTRUCTIONS
DIET: You may resume your home diet. If nausea is present, increase your diet gradually with fluids and bland foods    ACTIVITY LEVEL: You have received sedation or an anesthetic, you may feel sleepy for several hours. Rest until you are more awake. Gradually resume your normal activities    Medications: Pain medication should be taken only if needed and as directed. If antibiotics are prescribed, the medication should be taken until completed. You will be given an updated list of you medications.    No driving, alcoholic beverages or signing legal documents for next 24 hours or while taking pain medication.       CALL THE DOCTOR:    For any obvious bleeding (some dried blood over the incision is normal).      Redness, swelling, foul smell around incision or fever over 101.   Shortness of breath, Coughing up Bloody sputum, Pains or Swelling in your Calves .   Persistent pain or nausea not relieved by medication.    If any unusual problems or difficulties occur contact your doctor. If you cannot contact your doctor but feel your signs and symptoms warrant a physicians attention return to the emergency room.      Continue present home wound care as discussed with Dr. Chan    Fall Prevention  Millions of people fall every year and injure themselves. You may have had anesthesia or sedation which may increase your risk of falling. You may have health issues that put you at an increased risk of falling.     Here are ways to reduce your risk of falling.  ·   · Make your home safe by keeping walkways clear of objects you may trip over.  · Use non-slip pads under rugs. Do not use area rugs or small throw rugs.  · Use non-slip mats in bathtubs and showers.  · Install handrails and lights on staircases.  · Do not walk in poorly lit areas.  · Do not stand on chairs or wobbly ladders.  · Use caution when reaching overhead or looking upward. This position can cause a loss of balance.  · Be sure your shoes fit properly,  have non-slip bottoms and are in good condition.   · Wear shoes both inside and out. Avoid going barefoot or wearing slippers.  · Be cautious when going up and down stairs, curbs, and when walking on uneven sidewalks.  · If your balance is poor, consider using a cane or walker.  · If your fall was related to alcohol use, stop or limit alcohol intake.   · If your fall was related to use of sleeping medicines, talk to your doctor about this. You may need to reduce your dosage at bedtime if you awaken during the night to go to the bathroom.    · To reduce the need for nighttime bathroom trips:  ¨ Avoid drinking fluids for several hours before going to bed  ¨ Empty your bladder before going to bed  ¨ Men can keep a urinal at the bedside  · Stay as active as you can. Balance, flexibility, strength, and endurance all come from exercise. They all play a role in preventing falls. Ask your healthcare provider which types of activity are right for you.  · Get your vision checked on a regular basis.  · If you have pets, know where they are before you stand up or walk so you don't trip over them.  Use night lights.

## 2019-06-05 NOTE — ANESTHESIA PREPROCEDURE EVALUATION
06/05/2019  Marvin Ray is a 59 y.o., male presenting for I&D of left foot.    Past Medical History:   Diagnosis Date    Arthritis     Cellulitis     CKD (chronic kidney disease), stage III     Coronary artery disease     Diabetes mellitus     Diabetic retinopathy     Diabetic ulcer of left foot     Glaucoma     Gout     Hyperlipemia     Hypertension     ICD (implantable cardioverter-defibrillator) in place 11/02/2018    Left chest    Non-pressure chronic ulcer of other part of left foot with fat layer exposed 10/23/2018    PVD (peripheral vascular disease)     Type 2 diabetes mellitus with left diabetic foot ulcer 10/29/2018    Unsteady gait     uses a wheelchair     Past Surgical History:   Procedure Laterality Date    AHMED GLAUCOMA IMPLANT Left 2011    DONE AT [facility]    AMPUTATION, TOE Left 12/5/2018    Performed by Mitch Chan MD at [facility] OR    AMPUTATION, TOES  2-5 Left 11/16/2018    Performed by [facility] Chan [facility] at [facility] OR    AORTOGRAM, WITH SERIALOGRAPHY, LEFT LOWER EXTREMITY, POSSIBLE INTERVENTION Left 4/30/2019    Performed by Mitch Chan [facility] at [facility] OR    BAERVELDT GLAUCOMA IMPLANT Left 2012    WITH CATARACT EXTRACTION//DONE AT [facility]    CARDIAC CATHETERIZATION Left 05/2016    CARDIAC DEFIBRILLATOR PLACEMENT Left 11/02/2018    CATARACT EXTRACTION W/  INTRAOCULAR LENS IMPLANT Left 2012    WITH BAERVEDT//DONE AT [facility]    CATARACT EXTRACTION W/  INTRAOCULAR LENS IMPLANT Right 09/26/2018    COMPLEX ()    CB DESTRUCTION WITH CYCLO G6 Left 02/15/2017        CYST REMOVAL      DEBRIDEMENT, FOOT  12/28/2018    Performed by [facility] at [facility] OR    Exam under anesthesia, left foot debridement, left foot washout, possible left second through fifth metatarsal resection Left 12/28/2018    Performed by Mitch BAINS  MD Dustin at [facility] OR    Exam under anesthesia, left foot debridement, washout and all other indicated procedures Left 12/5/2018    Performed by Mitch Chan [facility] at [facility] OR    EXCISION, LESION, METATARSAL BONE  12/28/2018    Performed by Mitch Chan MD at [facility] OR    HEART CATH-LEFT N/A 5/6/2016    Performed by Mike Magana MD at [facility] CATH LAB    INCISION AND DRAINAGE Left 11/16/2018    Performed by Mitch Chan MD at [facility] OR    Incision and Drainage, left lower extremity debridement, washout Left 11/14/2018    Performed by Mitch Chan MD at [facility] OR    INSERTION, ICD GENERATOR, SINGLE CHAMBER N/A 11/2/2018    Performed by Pernell Greer MD at [facility] CATH LAB    INSERTION, IOL PROSTHESIS Right 9/26/2018    Performed by Perla Cortés MD at [facility] OR 1ST FLR    LEFT FOOT WOUND DEBRIDEMENT, WASHOUT AND ALL OTHER INDICATED PROCEDURES Left 2/13/2019    Performed by Mitch Chan MD at [facility] OR    PARACENTESIS, ABDOMINAL, INITIAL N/A 3/25/2019    [facility] by [facility] at [facility] OR    PARACENTESIS, ABDOMINAL, INITIAL N/A 3/1/2019    Performed by [facility] Diagnostic Provider at [facility] OR    PARACENTESIS, ABDOMINAL, REPEAT N/A 2/11/2019    Performed by [facility] at [facility] OR    PHACOEMULSIFICATION, CATARACT Right 9/26/2018    Performed by Perla Cortés MD at [facility] OR 1ST FLR    Right foot surgery  10/2014    TOE AMPUTATION Right     first and second    TONSILLECTOMY      TRABECULECTOMY/G 6 LASER Left 2/15/2017    Performed by Perla Crotés MD at [facility] OR 1ST FLR     Review of patient's allergies indicates:   Allergen Reactions    Statins-hmg-coa reductase inhibitors      Generalized Pain    Onglyza [saxagliptin]     Penicillins Rash     Current Facility-Administered Medications on File Prior to Encounter   Medication Dose Route Frequency Provider Last Rate Last Dose    lactated ringers infusion   Intravenous Continuous Tam  JA Reynolds MD        lidocaine (PF) 10 mg/ml (1%) injection 10 mg  1 mL Intradermal Once Tam Reynolds MD         Current Outpatient Medications on File Prior to Encounter   Medication Sig Dispense Refill    allopurinol (ZYLOPRIM) 300 MG tablet Take 1 tablet (300 mg total) by mouth once daily. 30 tablet 3    bisacodyl (DULCOLAX) 5 mg EC tablet Take 5 mg by mouth daily as needed for Constipation.      brimonidine 0.2% (ALPHAGAN) 0.2 % Drop Place 1 drop into both eyes 2 (two) times daily. 10 mL 11    carvedilol (COREG) 3.125 MG tablet Take 1 tablet (3.125 mg total) by mouth 2 (two) times daily. 60 tablet 11    dorzolamide-timolol 2-0.5% (COSOPT) 22.3-6.8 mg/mL ophthalmic solution Place 1 drop into both eyes 2 (two) times daily. 1 Bottle 11    ezetimibe (ZETIA) 10 mg tablet TAKE 1 TABLET(10 MG) BY MOUTH EVERY DAY 90 tablet 0    furosemide (LASIX) 20 MG tablet       insulin degludec-liraglutide (XULTOPHY 100/3.6) 100 unit-3.6 mg /mL (3 mL) InPn Inject 42 Units into the skin once daily. 5 Syringe 5    losartan (COZAAR) 25 MG tablet Take 1 tablet (25 mg total) by mouth once daily. 90 tablet 3    metOLazone (ZAROXOLYN) 5 MG tablet Take 1 tablet (5 mg total) by mouth every Monday and Thursday. 30 minutes before lasix. 30 tablet 2    aspirin 325 MG tablet Take 325 mg by mouth once daily.       carvedilol (COREG) 6.25 MG tablet   11    coenzyme Q10 100 mg capsule Take 100 mg by mouth every morning.      fish oil-omega-3 fatty acids 300-1,000 mg capsule Take 1 capsule by mouth 2 (two) times daily. 60 capsule 3    insulin aspart U-100 (NOVOLOG) 100 unit/mL InPn pen Use on premeal readings: 150-200=+2, 201-250=+4; 251-300=+6; 301-350=+8, over 350=+10 units 2.7 mL 11    miconazole NITRATE 2 % (MICOTIN) 2 % top powder Apply topically 2 (two) times daily.  0    MULTIVIT,THER IRON,CA,FA & MIN (MULTIVITAMIN) Tab Take 1 tablet by mouth every morning.       oxyCODONE-acetaminophen (PERCOCET) 5-325 mg per tablet  "Take 1 tablet by mouth every 6 (six) hours as needed for Pain. 45 tablet 0    pen needle, diabetic 31 gauge x 1/4" Ndle Use as directed 100 each 11    SANTYL ointment FEDERICO TOPICALLY ONCE D  0    torsemide (DEMADEX) 20 MG Tab TAKE 4 TABLETS BY MOUTH TWICE DAILY 120 tablet 0     Lab Results   Component Value Date    WBC 6.61 05/31/2019    HGB 9.3 (L) 05/31/2019    HCT 29.1 (L) 05/31/2019    MCV 86 05/31/2019     05/31/2019     BMP  Lab Results   Component Value Date     (L) 06/04/2019    K 3.3 (L) 06/04/2019    CL 97 06/04/2019    CO2 31 (H) 06/04/2019    BUN 63 (H) 06/04/2019    CREATININE 1.2 06/04/2019    CALCIUM 9.1 06/04/2019    ANIONGAP 7 (L) 06/04/2019    ESTGFRAFRICA >60 06/04/2019    EGFRNONAA >60 06/04/2019   \      Anesthesia Evaluation    I have reviewed the Patient Summary Reports.     I have reviewed the Medications.     Review of Systems  Anesthesia Hx:  No problems with previous Anesthesia   Denies Personal Hx of Anesthesia complications.   Cardiovascular:   Pacemaker Hypertension CAD   CHF    Renal/:   Chronic Renal Disease    Hepatic/GI:   Liver Disease,    Neurological:   Denies CVA. Denies Seizures.   Peripheral Neuropathy        Physical Exam  General:  Well nourished    Airway/Jaw/Neck:  Airway Findings: Mouth Opening: Normal Tongue: Normal  General Airway Assessment: Adult  Mallampati: II  TM Distance: Normal, at least 6 cm  Jaw/Neck Findings:  Neck ROM: Normal ROM      Dental:  Dental Findings: Periodontal disease, Severe         Mental Status:  Mental Status Findings:  Cooperative, Alert and Oriented         Anesthesia Plan  Type of Anesthesia, risks & benefits discussed:  Anesthesia Type:  MAC, regional  Patient's Preference:   Intra-op Monitoring Plan: standard ASA monitors  Intra-op Monitoring Plan Comments:   Post Op Pain Control Plan: per primary service following discharge from PACU  Post Op Pain Control Plan Comments:   Induction:   IV  Beta Blocker:  Patient is not " currently on a Beta-Blocker (No further documentation required).       Informed Consent: Patient understands risks and agrees with Anesthesia plan.  Questions answered. Anesthesia consent signed with patient.  ASA Score: 4     Day of Surgery Review of History & Physical:            Ready For Surgery From Anesthesia Perspective.

## 2019-06-05 NOTE — TELEPHONE ENCOUNTER
Spoke with pts sister and had pt scheduled to come in for a follow up appt next thursdat 6 /13 at [facility] . Pt sister verbalized understanding of appt time date and location.

## 2019-06-06 LAB — POCT GLUCOSE: 85 MG/DL (ref 70–110)

## 2019-06-06 NOTE — ANESTHESIA POSTPROCEDURE EVALUATION
Anesthesia Post Evaluation    Patient: Marvin Ray    Procedure(s) Performed: Procedure(s) (LRB):  LEFT FOOT DEBRIDEMENT, WASHOUT AND ALL OTHER INDICATED PROCEDURES (Left)    Final Anesthesia Type: MAC  Patient location during evaluation: PACU  Patient participation: Yes- Able to Participate  Level of consciousness: awake and alert  Post-procedure vital signs: reviewed and stable  Pain management: adequate  Airway patency: patent  PONV status at discharge: No PONV  Anesthetic complications: no      Cardiovascular status: hemodynamically stable  Respiratory status: spontaneous ventilation  Hydration status: hypervolemic  Follow-up not needed.          Vitals Value Taken Time   /60 6/5/2019  4:30 PM   Temp 36.4 °C (97.6 °F) 6/5/2019  4:30 PM   Pulse 76 6/5/2019  4:30 PM   Resp 15 6/5/2019  4:30 PM   SpO2 98 % 6/5/2019  4:30 PM         Event Time     Out of Recovery 15:57:13          Pain/Ralf Score: Pain Rating Prior to Med Admin: 4 (6/5/2019  4:50 PM)  Pain Rating Post Med Admin: 4 (6/5/2019  4:50 PM)  Ralf Score: 10 (6/5/2019  4:40 PM)

## 2019-06-06 NOTE — OP NOTE
DATE OF PROCEDURE:  06/05/2019.    SERVICE:  Vascular Surgery.    PREOPERATIVE DIAGNOSES:  1.  Left foot wound with necrosis of the underlying musculature, bone and breakdown of   skin and subcutaneous tissue measuring 24 x 15 x 1 cm.  2.  Hypertension.  3.  Hyperlipidemia.  4.  Cirrhosis.  5.  Diabetes mellitus.    POSTOPERATIVE DIAGNOSES:  1.  Left foot wound with necrosis of the underlying musculature and breakdown of   skin and subcutaneous tissue measuring 24 x 15 x 1 cm.  2.  Hypertension.  3.  Hyperlipidemia.  4.  Cirrhosis.  5.  Diabetes mellitus.    PROCEDURES PERFORMED:  1.  Left foot wound debridement.  2.  Left foot wound washout with Pulsavac irrigation.    ESTIMATED BLOOD LOSS:  20 mL    COMPLICATIONS:  None.    DESCRIPTION OF PROCEDURE:  The patient was seen preoperatively by the Anesthesia   team and was deemed stable for surgery.  He was brought to the Operating Room   and placed supine on the operating room table.  He was prepped and draped in   sterile fashion.  A timeout was performed.  A scalpel was used to debride the   entire foot wound down to muscle and bone, and removing the overlying slough and fibrinous tissue to   healthy bleeding granulation tissue.  There was no evidence of deep infection.    The wound was then irrigated with Pulsavac with bacitracin saline.  A sterile   dressing was applied to the wound and the patient was then transferred to   stretcher and back to the Recovery Room in stable condition.    ANESTHESIA:  Regional and IV.      MIKAELA/KAREN  dd: 06/05/2019 19:03:58 (CDT)  td: 06/05/2019 19:47:55 (CUBAT)  Doc ID   #[id number]  Job ID #[id number]    CC:

## 2019-06-07 DIAGNOSIS — I73.9 PVD (PERIPHERAL VASCULAR DISEASE): Primary | ICD-10-CM

## 2019-06-10 ENCOUNTER — LAB VISIT (OUTPATIENT)
Dept: LAB | Facility: HOSPITAL | Age: 59
End: 2019-06-10
Payer: COMMERCIAL

## 2019-06-10 ENCOUNTER — OFFICE VISIT (OUTPATIENT)
Dept: ENDOCRINOLOGY | Facility: CLINIC | Age: 59
End: 2019-06-10
Payer: COMMERCIAL

## 2019-06-10 VITALS
WEIGHT: 182.13 LBS | BODY MASS INDEX: 27.69 KG/M2 | DIASTOLIC BLOOD PRESSURE: 58 MMHG | SYSTOLIC BLOOD PRESSURE: 112 MMHG

## 2019-06-10 DIAGNOSIS — N18.30 CKD (CHRONIC KIDNEY DISEASE) STAGE 3, GFR 30-59 ML/MIN: ICD-10-CM

## 2019-06-10 DIAGNOSIS — I10 ESSENTIAL HYPERTENSION: ICD-10-CM

## 2019-06-10 DIAGNOSIS — E11.42 DM TYPE 2 WITH DIABETIC PERIPHERAL NEUROPATHY: Primary | ICD-10-CM

## 2019-06-10 DIAGNOSIS — N18.30 STAGE 3 CHRONIC KIDNEY DISEASE: ICD-10-CM

## 2019-06-10 LAB
BILIRUB UR QL STRIP: NEGATIVE
CLARITY UR: CLEAR
COLOR UR: YELLOW
CREAT UR-MCNC: 36.6 MG/DL (ref 23–375)
GLUCOSE UR QL STRIP: NEGATIVE
HGB UR QL STRIP: NEGATIVE
KETONES UR QL STRIP: NEGATIVE
LEUKOCYTE ESTERASE UR QL STRIP: NEGATIVE
NITRITE UR QL STRIP: NEGATIVE
PH UR STRIP: 6 [PH] (ref 5–8)
PROT UR QL STRIP: NEGATIVE
PROT UR-MCNC: 8 MG/DL
PROT/CREAT UR: 0.22 MG/G{CREAT} (ref 0–0.2)
SP GR UR STRIP: 1.01 (ref 1–1.03)
URN SPEC COLLECT METH UR: NORMAL
UROBILINOGEN UR STRIP-ACNC: NEGATIVE EU/DL

## 2019-06-10 PROCEDURE — 3008F PR BODY MASS INDEX (BMI) DOCUMENTED: ICD-10-PCS | Mod: CPTII,S$GLB,, | Performed by: NURSE PRACTITIONER

## 2019-06-10 PROCEDURE — 3078F DIAST BP <80 MM HG: CPT | Mod: CPTII,S$GLB,, | Performed by: NURSE PRACTITIONER

## 2019-06-10 PROCEDURE — 3074F PR MOST RECENT SYSTOLIC BLOOD PRESSURE < 130 MM HG: ICD-10-PCS | Mod: CPTII,S$GLB,, | Performed by: NURSE PRACTITIONER

## 2019-06-10 PROCEDURE — 3078F PR MOST RECENT DIASTOLIC BLOOD PRESSURE < 80 MM HG: ICD-10-PCS | Mod: CPTII,S$GLB,, | Performed by: NURSE PRACTITIONER

## 2019-06-10 PROCEDURE — 81003 URINALYSIS AUTO W/O SCOPE: CPT

## 2019-06-10 PROCEDURE — 3074F SYST BP LT 130 MM HG: CPT | Mod: CPTII,S$GLB,, | Performed by: NURSE PRACTITIONER

## 2019-06-10 PROCEDURE — 3044F HG A1C LEVEL LT 7.0%: CPT | Mod: CPTII,S$GLB,, | Performed by: NURSE PRACTITIONER

## 2019-06-10 PROCEDURE — 84156 ASSAY OF PROTEIN URINE: CPT

## 2019-06-10 PROCEDURE — 99214 OFFICE O/P EST MOD 30 MIN: CPT | Mod: S$GLB,,, | Performed by: NURSE PRACTITIONER

## 2019-06-10 PROCEDURE — 3044F PR MOST RECENT HEMOGLOBIN A1C LEVEL <7.0%: ICD-10-PCS | Mod: CPTII,S$GLB,, | Performed by: NURSE PRACTITIONER

## 2019-06-10 PROCEDURE — 3008F BODY MASS INDEX DOCD: CPT | Mod: CPTII,S$GLB,, | Performed by: NURSE PRACTITIONER

## 2019-06-10 PROCEDURE — 99999 PR PBB SHADOW E&M-EST. PATIENT-LVL IV: CPT | Mod: PBBFAC,,, | Performed by: NURSE PRACTITIONER

## 2019-06-10 PROCEDURE — 99214 PR OFFICE/OUTPT VISIT, EST, LEVL IV, 30-39 MIN: ICD-10-PCS | Mod: S$GLB,,, | Performed by: NURSE PRACTITIONER

## 2019-06-10 PROCEDURE — 99999 PR PBB SHADOW E&M-EST. PATIENT-LVL IV: ICD-10-PCS | Mod: PBBFAC,,, | Performed by: NURSE PRACTITIONER

## 2019-06-10 NOTE — PROGRESS NOTES
CC: This 59 y.o. White male  is here for evaluation of  T2DM along with comorbidities indicated in the Visit Diagnosis section of this encounter.    HPI: Marvin Ray was diagnosed with T2DM in his late 20s. Pt was initially diet controlled, then required oral medications, then eventually required insulin.     A1c in July 2018 was high at > 14%  because he couldn't afford insulin (Lantus ~$600). States his insurance plan is very expensive this and he makes a low salary. Also has a deductible of at least 1k.         Prior visit on 1/31/19 arrives with his sister Chloé.   He was just discharged from hospital last week. Has been admitted to hospital or LTAC often over the last few months. Had amputations to all 5 toes to left foot since r/t osteoyelitis. Also suffered cardiac arrest in Dec.   He resumed Soliqua a week after after hospital discharge.   Plan Continue novolog with correction scale.   Increase Soliqua starting tomorrow to 38 units every morning. After 4 days, if most blood sugars are still above 150, increase to 42 units every morning.   Send a blood sugar log in 2 weeks. Return to clinic in 5 months.     Interval history arrives with his sister Chloé.   Doing well with his BGs and Xultophy.   He has a left foot wound and is seeing Wound clinic as well as Surgery in order to avoid amputation. He has been admitted since lov for HF exacerbation.       LAST DIABETES EDUCATION: years ago     PRESCRIBED DIABETES MEDICATIONS:  Xultophy 34 units every morning, Novolog per sliding scale - Use on premeal readings: 150-200=+2, 201-250=+4; 251-300=+6; 301-350=+8, over 350=+10 units      Misses medication doses - No    DM COMPLICATIONS: nephropathy, retinopathy and peripheral neuropathy  toe amputations (2/2 infection)    SIGNIFICANT DIABETES MED HISTORY:   Constipation on Onglyza     SELF MONITORING BLOOD GLUCOSE:               HYPOGLYCEMIC EPISODES: none      CURRENT DIET: drinks water and black tea with  lemon. Eats 3 meals/day.     Breakfast is 3 eggs with a slice of toast, sometimes oatmeal or grits. Likes protein shakes.         BP (!) 112/58 (BP Location: Right arm, Patient Position: Sitting, BP Method: Large (Manual))   Wt 82.6 kg (182 lb 1.6 oz)   BMI 27.69 kg/m²       ROS:   CONSTITUTIONAL: Appetite good, denies fatigue  RESPIRATORY: No shortness of breath  CARDIAC: No chest pain  OTHER: n/a      PHYSICAL EXAM:  GENERAL: Well developed, well nourished. No acute distress.   PSYCH: AAOx3, appropriate mood and affect, conversant, casually-groomed. Judgement and insight good. Appears chronically ill. Arrives in w/c   NEURO: Cranial nerves grossly intact. Speech clear, no tremor.   CHEST: Respirations even and unlabored.         Hemoglobin A1C   Date Value Ref Range Status   03/11/2019 6.7 (H) 4.0 - 5.6 % Final     Comment:     ADA Screening Guidelines:  5.7-6.4%  Consistent with prediabetes  >or=6.5%  Consistent with diabetes  High levels of fetal hemoglobin interfere with the HbA1C  assay. Heterozygous hemoglobin variants (HbS, HgC, etc)do  not significantly interfere with this assay.   However, presence of multiple variants may affect accuracy.     10/30/2018 8.3 (H) 4.0 - 5.6 % Final     Comment:     ADA Screening Guidelines:  5.7-6.4%  Consistent with prediabetes  >or=6.5%  Consistent with diabetes  High levels of fetal hemoglobin interfere with the HbA1C  assay. Heterozygous hemoglobin variants (HbS, HgC, etc)do  not significantly interfere with this assay.   However, presence of multiple variants may affect accuracy.     07/27/2018 >14.0 (H) 4.0 - 5.6 % Final     Comment:     ADA Screening Guidelines:  5.7-6.4%  Consistent with prediabetes  >or=6.5%  Consistent with diabetes  High levels of fetal hemoglobin interfere with the HbA1C  assay. Heterozygous hemoglobin variants (HbS, HgC, etc)do  not significantly interfere with this assay.   However, presence of multiple variants may affect accuracy.              Chemistry        Component Value Date/Time     (L) 06/04/2019 1300    K 3.3 (L) 06/04/2019 1300    CL 97 06/04/2019 1300    CO2 31 (H) 06/04/2019 1300    BUN 63 (H) 06/04/2019 1300    CREATININE 1.2 06/04/2019 1300     (H) 06/04/2019 1300        Component Value Date/Time    CALCIUM 9.1 06/04/2019 1300    ALKPHOS 86 03/25/2019 0920    AST 14 03/25/2019 0920    ALT 15 03/25/2019 0920    BILITOT 0.3 03/25/2019 0920    ESTGFRAFRICA >60 06/04/2019 1300    EGFRNONAA >60 06/04/2019 1300          Lab Results   Component Value Date    LDLCALC 71.2 10/30/2018        Ref. Range 7/27/2018 08:20   Cholesterol Latest Ref Range: 120 - 199 mg/dL 151   HDL Latest Ref Range: 40 - 75 mg/dL 29 (L)   LDL Cholesterol Latest Ref Range: 63.0 - 159.0 mg/dL 105.4   Total Cholesterol/HDL Ratio Latest Ref Range: 2.0 - 5.0  5.2 (H)   Triglycerides Latest Ref Range: 30 - 150 mg/dL 83     Lab Results   Component Value Date    MICALBCREAT 4564.0 (H) 07/27/2018           STANDARDS of CARE:        ASA:               Last eye exam:       ASSESSMENT and PLAN:    A1C GOAL: < 7 %     1. DM type 2 with diabetic peripheral neuropathy  Hemoglobin A1c - at earliest convenience.     Continue current regimen. Glucoses are stable without hypoglycemia. Test BG 1x/day. rtc in 6 mo with a1c prior.       Hemoglobin A1c   2. Essential hypertension  controlled   3. CKD (chronic kidney disease) stage 3, GFR 30-59 ml/min  Maintain glucose control            Orders Placed This Encounter   Procedures    Hemoglobin A1c     Standing Status:   Future     Standing Expiration Date:   8/8/2020    Hemoglobin A1c     Standing Status:   Future     Standing Expiration Date:   8/8/2020        Follow up in about 6 months (around 12/10/2019).

## 2019-06-12 ENCOUNTER — TELEPHONE (OUTPATIENT)
Dept: PLASTIC SURGERY | Facility: CLINIC | Age: 59
End: 2019-06-12

## 2019-06-13 ENCOUNTER — OFFICE VISIT (OUTPATIENT)
Dept: PLASTIC SURGERY | Facility: CLINIC | Age: 59
End: 2019-06-13
Payer: COMMERCIAL

## 2019-06-13 VITALS
SYSTOLIC BLOOD PRESSURE: 103 MMHG | TEMPERATURE: 99 F | HEART RATE: 85 BPM | BODY MASS INDEX: 27.97 KG/M2 | WEIGHT: 184 LBS | DIASTOLIC BLOOD PRESSURE: 55 MMHG

## 2019-06-13 DIAGNOSIS — N18.30 CKD STAGE 3 DUE TO TYPE 2 DIABETES MELLITUS: ICD-10-CM

## 2019-06-13 DIAGNOSIS — S91.302D OPEN WOUND OF FOOT, LEFT, SUBSEQUENT ENCOUNTER: Primary | ICD-10-CM

## 2019-06-13 DIAGNOSIS — E11.22 CKD STAGE 3 DUE TO TYPE 2 DIABETES MELLITUS: ICD-10-CM

## 2019-06-13 PROCEDURE — 3008F BODY MASS INDEX DOCD: CPT | Mod: CPTII,S$GLB,, | Performed by: SURGERY

## 2019-06-13 PROCEDURE — 99999 PR PBB SHADOW E&M-EST. PATIENT-LVL III: CPT | Mod: PBBFAC,,, | Performed by: SURGERY

## 2019-06-13 PROCEDURE — 99214 OFFICE O/P EST MOD 30 MIN: CPT | Mod: S$GLB,,, | Performed by: SURGERY

## 2019-06-13 PROCEDURE — 3044F HG A1C LEVEL LT 7.0%: CPT | Mod: CPTII,S$GLB,, | Performed by: SURGERY

## 2019-06-13 PROCEDURE — 3008F PR BODY MASS INDEX (BMI) DOCUMENTED: ICD-10-PCS | Mod: CPTII,S$GLB,, | Performed by: SURGERY

## 2019-06-13 PROCEDURE — 99999 PR PBB SHADOW E&M-EST. PATIENT-LVL III: ICD-10-PCS | Mod: PBBFAC,,, | Performed by: SURGERY

## 2019-06-13 PROCEDURE — 3078F DIAST BP <80 MM HG: CPT | Mod: CPTII,S$GLB,, | Performed by: SURGERY

## 2019-06-13 PROCEDURE — 3074F SYST BP LT 130 MM HG: CPT | Mod: CPTII,S$GLB,, | Performed by: SURGERY

## 2019-06-13 PROCEDURE — 3044F PR MOST RECENT HEMOGLOBIN A1C LEVEL <7.0%: ICD-10-PCS | Mod: CPTII,S$GLB,, | Performed by: SURGERY

## 2019-06-13 PROCEDURE — 3074F PR MOST RECENT SYSTOLIC BLOOD PRESSURE < 130 MM HG: ICD-10-PCS | Mod: CPTII,S$GLB,, | Performed by: SURGERY

## 2019-06-13 PROCEDURE — 99214 PR OFFICE/OUTPT VISIT, EST, LEVL IV, 30-39 MIN: ICD-10-PCS | Mod: S$GLB,,, | Performed by: SURGERY

## 2019-06-13 PROCEDURE — 3078F PR MOST RECENT DIASTOLIC BLOOD PRESSURE < 80 MM HG: ICD-10-PCS | Mod: CPTII,S$GLB,, | Performed by: SURGERY

## 2019-06-13 RX ORDER — TORSEMIDE 20 MG/1
TABLET ORAL
Qty: 120 TABLET | Refills: 0 | Status: SHIPPED | OUTPATIENT
Start: 2019-06-13 | End: 2019-07-05 | Stop reason: SDUPTHER

## 2019-06-18 ENCOUNTER — HOSPITAL ENCOUNTER (OUTPATIENT)
Dept: WOUND CARE | Facility: HOSPITAL | Age: 59
Discharge: HOME | End: 2019-06-18
Attending: FAMILY MEDICINE
Payer: COMMERCIAL

## 2019-06-18 DIAGNOSIS — E11.621 TYPE 2 DIABETES MELLITUS WITH LEFT DIABETIC FOOT ULCER: Primary | ICD-10-CM

## 2019-06-18 DIAGNOSIS — L97.529 TYPE 2 DIABETES MELLITUS WITH LEFT DIABETIC FOOT ULCER: Primary | ICD-10-CM

## 2019-06-18 PROCEDURE — 97606 NEG PRS WND THER DME>50 SQCM: CPT | Performed by: FAMILY MEDICINE

## 2019-06-18 PROCEDURE — 99213 OFFICE O/P EST LOW 20 MIN: CPT | Performed by: FAMILY MEDICINE

## 2019-06-18 PROCEDURE — 99214 OFFICE O/P EST MOD 30 MIN: CPT | Mod: ,,, | Performed by: FAMILY MEDICINE

## 2019-06-18 PROCEDURE — 99214 PR OFFICE/OUTPT VISIT, EST, LEVL IV, 30-39 MIN: ICD-10-PCS | Mod: ,,, | Performed by: FAMILY MEDICINE

## 2019-06-18 NOTE — PROGRESS NOTES
Yefri Montana - Plastic Surg [facility]  General Surgery  Progress Note     Subjective:      History of Present Illness:     Returns for wound check after last debridement by Dr. Chan.  He had strong preference for foot salvage.  Since that time, he has had angioplasty and new vascular studies.     4/30 Angiogram  Procedure:  1. US guided R CFA access  2. Aortogram with CO2  3. L femoral angiogram with CO2  4. L popliteal angiogram  5. L tibial angiogram   6. L peroneal angioplasty with a [id number] Emblem balloon   7. L peroneal angioplasty with a 2.4o960kh Emblem balloon  8. L AT angioplasty with a [id number] Emblem balloon  9. L AT angioplasty with a 2.5x100 Emblem balloon   10. 6fr Mynx closure R CFA  11. Moderate sedation     Per Dr. Chan's note:  5/16/19: BLE with no HD significant stenosis, SIDRA 0.8/1.46, left ankle pressures 60 and 171        Past History:      They return asking to discuss limb salvage options.  He is again accompanied by his sister.  His sister has extensive experience with wound vac therapy.  I was asked to evaluate the wound by Dr. Chan, because he says the wound looks  since his last vascular intervention.  He is offloading his foot, using it only for transfers.  Earlier this year he had an admission to have a parcentesis.      Interval History  He is no longer taking antibiotics. No new pain.  Doing kerlix wet to dry dressing changes.         Current Outpatient Medications:     allopurinol (ZYLOPRIM) 300 MG tablet, Take 1 tablet (300 mg total) by mouth once daily., Disp: 30 tablet, Rfl: 3    aspirin 325 MG tablet, Take 325 mg by mouth once daily. , Disp: , Rfl:     bisacodyl (DULCOLAX) 5 mg EC tablet, Take 5 mg by mouth daily as needed for Constipation., Disp: , Rfl:     brimonidine 0.2% (ALPHAGAN) 0.2 % Drop, Place 1 drop into both eyes 2 (two) times daily., Disp: 10 mL, Rfl: 11    carvedilol (COREG) 3.125 MG tablet, Take 1 tablet (3.125 mg total) by mouth 2 (two) times  "daily., Disp: 60 tablet, Rfl: 11    carvedilol (COREG) 6.25 MG tablet, , Disp: , Rfl: 11    coenzyme Q10 100 mg capsule, Take 100 mg by mouth every morning., Disp: , Rfl:     dorzolamide-timolol 2-0.5% (COSOPT) 22.3-6.8 mg/mL ophthalmic solution, Place 1 drop into both eyes 2 (two) times daily., Disp: 1 Bottle, Rfl: 11    ezetimibe (ZETIA) 10 mg tablet, TAKE 1 TABLET(10 MG) BY MOUTH EVERY DAY, Disp: 90 tablet, Rfl: 0    fish oil-omega-3 fatty acids 300-1,000 mg capsule, Take 1 capsule by mouth 2 (two) times daily., Disp: 60 capsule, Rfl: 3    furosemide (LASIX) 20 MG tablet, , Disp: , Rfl:     insulin aspart U-100 (NOVOLOG) 100 unit/mL InPn pen, Use on premeal readings: 150-200=+2, 201-250=+4; 251-300=+6; 301-350=+8, over 350=+10 units, Disp: 2.7 mL, Rfl: 11    insulin degludec-liraglutide (XULTOPHY 100/3.6) 100 unit-3.6 mg /mL (3 mL) InPn, Inject 42 Units into the skin once daily., Disp: 5 Syringe, Rfl: 5    losartan (COZAAR) 25 MG tablet, Take 1 tablet (25 mg total) by mouth once daily., Disp: 90 tablet, Rfl: 3    metOLazone (ZAROXOLYN) 5 MG tablet, Take 1 tablet (5 mg total) by mouth every Monday and Thursday. 30 minutes before lasix., Disp: 30 tablet, Rfl: 2    miconazole NITRATE 2 % (MICOTIN) 2 % top powder, Apply topically 2 (two) times daily., Disp: , Rfl: 0    MULTIVIT,THER IRON,CA,FA & MIN (MULTIVITAMIN) Tab, Take 1 tablet by mouth every morning. , Disp: , Rfl:     oxyCODONE-acetaminophen (PERCOCET) 5-325 mg per tablet, Take 1 tablet by mouth every 6 (six) hours as needed for Pain., Disp: 45 tablet, Rfl: 0    pen needle, diabetic 31 gauge x 1/4" Ndle, Use as directed, Disp: 100 each, Rfl: 11    SANTYL ointment, FEDERICO TOPICALLY ONCE D, Disp: , Rfl: 0    torsemide (DEMADEX) 20 MG Tab, TAKE 4 TABLETS BY MOUTH TWICE DAILY, Disp: 120 tablet, Rfl: 0  No current facility-administered medications for this visit.      Facility-Administered Medications Ordered in Other Visits:     lactated ringers " infusion, , Intravenous, Continuous, Tam Reynolds MD    lidocaine (PF) 10 mg/ml (1%) injection 10 mg, 1 mL, Intradermal, Once, Tam Reynolds MD               Review of patient's allergies indicates:   Allergen Reactions    Statins-hmg-coa reductase inhibitors         Generalized Pain    Onglyza [saxagliptin]      Penicillins Rash      Objective:    Weight: 80.3 kg (177 lb)  Body mass index is 25.4 kg/m².     Improved granulation tissue left TMA stump  Interval contraction of wounds with epithelialization of wound edges  Improved soft tissue over calcaneus, at TMA stump, over plantar surface of his foot  Still awaiting granulation tissue over exposed metatarsal heads        Physical Exam   Constitutional: He is oriented to person, place, and time. He appears well-developed and well-nourished. No distress.   HENT:   Head: Normocephalic and atraumatic.   Right Ear: Hearing and external ear normal.   Left Ear: Hearing and external ear normal.   Nose: Nose normal.   Eyes: Pupils are equal, round, and reactive to light. Conjunctivae are normal.   Cardiovascular: Normal rate and regular rhythm.   Pulmonary/Chest: Effort normal and breath sounds normal.   Normal respiratory effort.    Abdominal: Soft. He exhibits no distension.   Musculoskeletal: He exhibits edema.        Left foot: There is decreased range of motion and deformity.   Feet:   Left Foot: amputated  Protective Sensation: 1 site tested. 1 site sensed.  Skin Integrity: Positive for skin breakdown and dry skin.   Neurological: He is alert and oriented to person, place, and time.   Skin: Skin is warm and dry. No rash noted. No pallor.   Psychiatric: He has a normal mood and affect. His speech is normal and behavior is normal.         Significant Labs:  Component      Latest Ref Rng & Units 5/31/2019   WBC      3.90 - 12.70 K/uL 6.61   RBC      4.60 - 6.20 M/uL 3.37 (L)   Hemoglobin      14.0 - 18.0 g/dL 9.3 (L)   Hematocrit      40.0 - 54.0 % 29.1 (L)    MCV      82 - 98 fL 86   MCH      27.0 - 31.0 pg 27.6   MCHC      32.0 - 36.0 g/dL 32.0   RDW      11.5 - 14.5 % 20.8 (H)   Platelets      150 - 350 K/uL 303   MPV      9.2 - 12.9 fL 8.8 (L)   Gran # (ANC)      1.8 - 7.7 K/uL 4.6   Lymph #      1.0 - 4.8 K/uL 0.8 (L)   Mono #      0.3 - 1.0 K/uL 0.8   Eos #      0.0 - 0.5 K/uL 0.4   Baso #      0.00 - 0.20 K/uL 0.03   Gran%      38.0 - 73.0 % 69.0   Lymph%      18.0 - 48.0 % 12.0 (L)   Mono%      4.0 - 15.0 % 12.1   Eosinophil%      0.0 - 8.0 % 6.4   Basophil%      0.0 - 1.9 % 0.5   Differential Method       Automated   Sodium      136 - 145 mmol/L 133 (L)   Potassium      3.5 - 5.1 mmol/L 2.7 (LL)   Chloride      95 - 110 mmol/L 92 (L)   CO2      23 - 29 mmol/L 31 (H)   Glucose      70 - 110 mg/dL 135 (H)   BUN, Bld      8 - 23 mg/dL 64 (H)   Creatinine      0.5 - 1.4 mg/dL 1.3   Calcium      8.7 - 10.5 mg/dL 9.3   Anion Gap      8 - 16 mmol/L 10   eGFR if African American      >60 mL/min/1.73 m:2 >60   eGFR if non African American      >60 mL/min/1.73 m:2 59 (A)         Coagulation:       Microbiology Results (last 7 days)      ** No results found for the last 168 hours. **                 Microbiology Results (last 7 days)      ** No results found for the last 168 hours. **          Significant Diagnostics:  I have personally reviewed the results of the vascular reports listed above        Assessment/Plan:      Diagnoses:  1.  Left lower extremity atherosclerosis, left foot wound with necrosis of   underlying skin and subcutaneous tissue.  Now with decreased devitalized tissue and intervally improved granulation tissue  2.  Hypertension.  3.  Hyperlipidemia.  4.  CKD stage III.  5.  Diabetes mellitus.  6.  Liver disease, status post paracentesis.  7.  Status post left open transmetatarsal amputation.  8.  Status post angioplasty revascularization of left lower extremity      Plan:  - Should have wound care follow up for vac dressing changes.  In my opinion,  placing a wound vac over heel and over TMA stump will facilitate with granulation formation and wound contraction.    - Antibiotics not indicated from my standpoint.  Continue ID follow up.  In my opinion, antibiotics are not indicated at this time.  Could send new ESR, CRP for baseline.  No infection in left leg in my opinion at this point  - When wound bed has improved coverage with granulation tissue, would proceed with skin grafting.       I discussed the goals of limb salvage with him.  Skin grafting would provide coverage of the wound, decrease risk of infection but would not provide a durable surface for high demand stump use.  It would however potentially limit the need for complex wound care and serve as an adequate surface for transfer.  I explained skin graft donor sites.  He is not a candidate for free tissue transfer.       Plastic & Reconstructive Surgery  [facility]  c/o Leonidas Diaz M.D.  [facility]  [address]  [address] [place] [address]     Work [phone]  Toll free [phone]  If no answer [phone]

## 2019-06-21 PROCEDURE — G0180 MD CERTIFICATION HHA PATIENT: HCPCS | Mod: ,,, | Performed by: SURGERY

## 2019-06-21 PROCEDURE — G0180 PR HOME HEALTH MD CERTIFICATION: ICD-10-PCS | Mod: ,,, | Performed by: SURGERY

## 2019-06-25 ENCOUNTER — HOSPITAL ENCOUNTER (OUTPATIENT)
Dept: WOUND CARE | Facility: HOSPITAL | Age: 59
Discharge: HOME | End: 2019-06-25
Attending: FAMILY MEDICINE
Payer: COMMERCIAL

## 2019-06-25 DIAGNOSIS — E11.621 TYPE 2 DIABETES MELLITUS WITH LEFT DIABETIC FOOT ULCER: Primary | ICD-10-CM

## 2019-06-25 DIAGNOSIS — L97.529 TYPE 2 DIABETES MELLITUS WITH LEFT DIABETIC FOOT ULCER: Primary | ICD-10-CM

## 2019-06-25 PROCEDURE — 97605 NEG PRS WND THER DME<=50SQCM: CPT | Performed by: FAMILY MEDICINE

## 2019-06-25 PROCEDURE — 99214 PR OFFICE/OUTPT VISIT, EST, LEVL IV, 30-39 MIN: ICD-10-PCS | Mod: ,,, | Performed by: FAMILY MEDICINE

## 2019-06-25 PROCEDURE — 99214 OFFICE O/P EST MOD 30 MIN: CPT | Mod: ,,, | Performed by: FAMILY MEDICINE

## 2019-06-27 RX ORDER — ALLOPURINOL 300 MG/1
300 TABLET ORAL DAILY
Qty: 30 TABLET | Refills: 3 | Status: SHIPPED | OUTPATIENT
Start: 2019-06-27 | End: 2019-09-27 | Stop reason: SDUPTHER

## 2019-07-02 ENCOUNTER — HOSPITAL ENCOUNTER (OUTPATIENT)
Dept: WOUND CARE | Facility: HOSPITAL | Age: 59
Discharge: HOME | End: 2019-07-02
Attending: FAMILY MEDICINE
Payer: COMMERCIAL

## 2019-07-02 DIAGNOSIS — E11.621 TYPE 2 DIABETES MELLITUS WITH LEFT DIABETIC FOOT ULCER: Primary | ICD-10-CM

## 2019-07-02 DIAGNOSIS — L97.529 TYPE 2 DIABETES MELLITUS WITH LEFT DIABETIC FOOT ULCER: Primary | ICD-10-CM

## 2019-07-02 PROCEDURE — 99214 PR OFFICE/OUTPT VISIT, EST, LEVL IV, 30-39 MIN: ICD-10-PCS | Mod: ,,, | Performed by: FAMILY MEDICINE

## 2019-07-02 PROCEDURE — 99214 OFFICE O/P EST MOD 30 MIN: CPT | Mod: ,,, | Performed by: FAMILY MEDICINE

## 2019-07-02 PROCEDURE — 97605 NEG PRS WND THER DME<=50SQCM: CPT | Performed by: FAMILY MEDICINE

## 2019-07-05 RX ORDER — TORSEMIDE 20 MG/1
TABLET ORAL
Qty: 120 TABLET | Refills: 0 | Status: SHIPPED | OUTPATIENT
Start: 2019-07-05 | End: 2019-08-01 | Stop reason: SDUPTHER

## 2019-07-09 ENCOUNTER — HOSPITAL ENCOUNTER (OUTPATIENT)
Dept: WOUND CARE | Facility: HOSPITAL | Age: 59
Discharge: HOME | End: 2019-07-09
Attending: FAMILY MEDICINE
Payer: COMMERCIAL

## 2019-07-09 DIAGNOSIS — E11.621 TYPE 2 DIABETES MELLITUS WITH LEFT DIABETIC FOOT ULCER: Primary | ICD-10-CM

## 2019-07-09 DIAGNOSIS — L97.529 TYPE 2 DIABETES MELLITUS WITH LEFT DIABETIC FOOT ULCER: Primary | ICD-10-CM

## 2019-07-09 PROCEDURE — 99214 PR OFFICE/OUTPT VISIT, EST, LEVL IV, 30-39 MIN: ICD-10-PCS | Mod: ,,, | Performed by: FAMILY MEDICINE

## 2019-07-09 PROCEDURE — 99214 OFFICE O/P EST MOD 30 MIN: CPT | Mod: ,,, | Performed by: FAMILY MEDICINE

## 2019-07-09 PROCEDURE — 97605 NEG PRS WND THER DME<=50SQCM: CPT | Performed by: FAMILY MEDICINE

## 2019-07-10 ENCOUNTER — TELEPHONE (OUTPATIENT)
Dept: NEPHROLOGY | Facility: CLINIC | Age: 59
End: 2019-07-10

## 2019-07-10 ENCOUNTER — EXTERNAL HOME HEALTH (OUTPATIENT)
Dept: HOME HEALTH SERVICES | Facility: HOSPITAL | Age: 59
End: 2019-07-10
Payer: COMMERCIAL

## 2019-07-10 DIAGNOSIS — N18.30 CHRONIC KIDNEY DISEASE, STAGE III (MODERATE): Primary | ICD-10-CM

## 2019-07-16 ENCOUNTER — HOSPITAL ENCOUNTER (OUTPATIENT)
Dept: WOUND CARE | Facility: HOSPITAL | Age: 59
Discharge: HOME | End: 2019-07-16
Attending: FAMILY MEDICINE
Payer: COMMERCIAL

## 2019-07-16 DIAGNOSIS — S91.302D OPEN WOUND OF FOOT, LEFT, SUBSEQUENT ENCOUNTER: Primary | ICD-10-CM

## 2019-07-16 PROCEDURE — 99214 OFFICE O/P EST MOD 30 MIN: CPT | Mod: ,,, | Performed by: FAMILY MEDICINE

## 2019-07-16 PROCEDURE — 99214 PR OFFICE/OUTPT VISIT, EST, LEVL IV, 30-39 MIN: ICD-10-PCS | Mod: ,,, | Performed by: FAMILY MEDICINE

## 2019-07-16 PROCEDURE — 97602 WOUND(S) CARE NON-SELECTIVE: CPT | Performed by: FAMILY MEDICINE

## 2019-07-17 ENCOUNTER — TELEPHONE (OUTPATIENT)
Dept: FAMILY MEDICINE | Facility: CLINIC | Age: 59
End: 2019-07-17

## 2019-07-17 RX ORDER — OXYCODONE AND ACETAMINOPHEN 5; 325 MG/1; MG/1
1 TABLET ORAL EVERY 6 HOURS PRN
Qty: 30 TABLET | Refills: 0 | Status: SHIPPED | OUTPATIENT
Start: 2019-07-17 | End: 2019-07-31

## 2019-07-17 RX ORDER — FUROSEMIDE 20 MG/1
TABLET ORAL
Refills: 3 | COMMUNITY
Start: 2019-06-30 | End: 2019-07-29

## 2019-07-17 NOTE — TELEPHONE ENCOUNTER
----- Message from Ava Miller sent at 7/17/2019 12:26 PM CDT -----  Contact: sister Chloé  [phone]  Type: RX Refill Request    Who Called: sister Chloé    Refill or New Rx: Refill    RX Name and Strength: oxyCODONE-acetaminophen (PERCOCET) 5-325 mg per tablet    Is this a 30 day or 90 day RX: 90 day    Preferred Pharmacy with phone number: .  [facility] [address] [place] [address] [place] [address] AT [address]  [place] [address]  Phone: [phone] Fax: [phone]    Local or Mail Order: Local    Would the patient rather a call back or a response via [facility]? Call back    Best Call Back Number: [phone]

## 2019-07-22 ENCOUNTER — HOSPITAL ENCOUNTER (OUTPATIENT)
Facility: HOSPITAL | Age: 59
Discharge: HOME | End: 2019-07-22
Attending: FAMILY MEDICINE | Admitting: RADIOLOGY
Payer: COMMERCIAL

## 2019-07-22 VITALS — SYSTOLIC BLOOD PRESSURE: 103 MMHG | DIASTOLIC BLOOD PRESSURE: 55 MMHG

## 2019-07-22 DIAGNOSIS — R18.8 OTHER ASCITES: ICD-10-CM

## 2019-07-22 DIAGNOSIS — Z01.818 PREOPERATIVE CLEARANCE: Primary | ICD-10-CM

## 2019-07-22 NOTE — PROCEDURES
Radiology Post-Procedure Note     Pre Op Diagnosis: Recurrent painful, tense ascites  Post Op Diagnosis: Same     Procedure: U/S-guided therapeutic LVP     Procedure performed by: Zach Cha MD     Written Informed Consent Obtained: Yes  Specimen Removed: YES, 4.3-L of serous ascitic fluid  Estimated Blood Loss: none     Findings:   1. Successful therapeutic large-volume paracentesis with local anesthetic only. Albumin infusion post not indicated per protocol. Patient tolerated the procedure well. No immediate post-procedural complications noted.         Zach Cha MD  Interventional Radiology

## 2019-07-22 NOTE — H&P
Radiology History & Physical      SUBJECTIVE:     Chief Complaint: Recurrent painful, tense ascites    History of Present Illness:  Marvin Ray is a 58 y.o. male with PMHx of CKD III and combined systolic/diastolic CHF complicated by recurrent abdominal distension and pain 2/2 recurrent painful, tense ascites requiring frequent large-volume therapeutic paracentesis.      Pt returns as outpatient with recurrence of abdominal distension and pain with +fluid wave on PE indicating recurrent ascites with outpatient request for image-guided therapeutic paracentesis.       Past Medical History:   Diagnosis Date    Arthritis     Cellulitis     CKD (chronic kidney disease), stage III     Coronary artery disease     Diabetes mellitus     Diabetic retinopathy     Diabetic ulcer of left foot     Glaucoma     Gout     Hyperlipemia     Hypertension     ICD (implantable cardioverter-defibrillator) in place 11/02/2018    Left chest    Non-pressure chronic ulcer of other part of left foot with fat layer exposed 10/23/2018    PVD (peripheral vascular disease)     Type 2 diabetes mellitus with left diabetic foot ulcer 10/29/2018    Unsteady gait     uses a wheelchair     Past Surgical History:   Procedure Laterality Date    AHMED GLAUCOMA IMPLANT Left 2011    DONE AT [facility]    AMPUTATION, TOE Left 12/5/2018    Performed by [facility] Chan [facility] at [facility] OR    AMPUTATION, TOES  2-5 Left 11/16/2018    [facility] by [facility] at [facility] OR    AORTOGRAM, WITH SERIALOGRAPHY, LEFT LOWER EXTREMITY, POSSIBLE INTERVENTION Left 4/30/2019    [facility] by [facility] at [facility] OR    BAERVELDT GLAUCOMA IMPLANT Left 2012    WITH CATARACT EXTRACTION//DONE AT [facility]    CARDIAC CATHETERIZATION Left 05/2016    CARDIAC DEFIBRILLATOR PLACEMENT Left 11/02/2018    CATARACT EXTRACTION W/  INTRAOCULAR LENS IMPLANT Left 2012    WITH BAERVEDT//DONE AT [facility]    CATARACT EXTRACTION W/  INTRAOCULAR LENS  IMPLANT Right 09/26/2018    COMPLEX ()    CB DESTRUCTION WITH CYCLO G6 Left 02/15/2017        CYST REMOVAL      DEBRIDEMENT, FOOT  6/5/2019    Per[facility] at [facility] OR    DEBRIDEMENT, FOOT  12/28/2018    Per[facility] by Mitch Chan [facility] at [facility] OR    Exam under anesthesia, left foot debridement, left foot washout, possible left second through fifth metatarsal resection Left 12/28/2018    Performed by Mitch Chan [facility] at [facility] OR    Exam under anesthesia, left foot debridement, washout and all other indicated procedures Left 12/5/2018    Performed by Mitch Chan [facility] at [facility] OR    EXCISION, LESION, METATARSAL BONE  12/28/2018    Performed by Mitch Chan [facility] at [facility] OR    HEART CATH-LEFT N/A 5/6/2016    Per[facility] at [facility] CATH LAB    INCISION AND DRAINAGE Left 11/16/2018    Performed by Mitch Chan MD at [facility] OR    Incision and Drainage, left lower extremity debridement, washout Left 11/14/2018    Performed by Mitch Chan [facility] at [facility] OR    INSERTION, ICD GENERATOR, SINGLE CHAMBER N/A 11/2/2018    Performed by Pernell Greer MD at [facility] CATH LAB    INSERTION, IOL PROSTHESIS Right 9/26/2018    Performed by Perla Cortés MD at [facility] OR 1ST FLR    LEFT FOOT DEBRIDEMENT, WASHOUT AND ALL OTHER INDICATED PROCEDURES Left 6/5/2019    Performed by Mitch Chan MD at [facility] OR    LEFT FOOT WOUND DEBRIDEMENT, WASHOUT AND ALL OTHER INDICATED PROCEDURES Left 2/13/2019    Performed by Mitch Chan [facility] at [facility] OR    PARACENTESIS, ABDOMINAL, INITIAL N/A 3/25/2019    Performed by [facility] Diagnostic Provider at [facility] OR    PARACENTESIS, ABDOMINAL, INITIAL N/A 3/1/2019    Performed by [facility] Diagnostic Provider at [facility] OR    PARACENTESIS, ABDOMINAL, REPEAT N/A 2/11/2019    Performed by [facility] Diagnostic Provider at [facility] OR    PHACOEMULSIFICATION, CATARACT Right 9/26/2018    Performed by Perla  MD Milana at [facility] OR 1ST FLR    Right foot surgery  10/2014    TOE AMPUTATION Right     first and second    TONSILLECTOMY      TRABECULECTOMY/G 6 LASER Left 2/15/2017    Performed by [facility]la [facility] at [facility] OR 1ST FLR    WASHOUT  6/5/2019    [facility] by [facility] OR       Home Meds:   Prior to Admission medications    Medication Sig Start Date End Date Taking? Authorizing Provider   allopurinol (ZYLOPRIM) 300 MG tablet Take 1 tablet (300 mg total) by mouth once daily. 6/27/19   Rubén Davis MD   aspirin 325 MG tablet Take 325 mg by mouth once daily.     Historical Provider, MD   bisacodyl (DULCOLAX) 5 mg EC tablet Take 5 mg by mouth daily as needed for Constipation.    Historical Provider, MD   brimonidine 0.2% (ALPHAGAN) 0.2 % Drop Place 1 drop into both eyes 2 (two) times daily. 2/8/19   Perla Cortés MD   carvedilol (COREG) 3.125 MG tablet Take 1 tablet (3.125 mg total) by mouth 2 (two) times daily. 1/23/19 1/23/20  Sara Ching MD   coenzyme Q10 100 mg capsule Take 100 mg by mouth every morning.    Historical Provider, MD   dorzolamide-timolol 2-0.5% (COSOPT) 22.3-6.8 mg/mL ophthalmic solution Place 1 drop into both eyes 2 (two) times daily. 2/8/19 2/8/20  Perla Cortés MD   ezetimibe (ZETIA) 10 mg tablet TAKE 1 TABLET(10 MG) BY MOUTH EVERY DAY 4/29/19   Rubén Davis MD   fish oil-omega-3 fatty acids 300-1,000 mg capsule Take 1 capsule by mouth 2 (two) times daily. 1/23/19   Sara Ching MD   furosemide (LASIX) 20 MG tablet  6/30/19   Historical Provider, MD   insulin aspart U-100 (NOVOLOG) 100 unit/mL InPn pen Use on premeal readings: 150-200=+2, 201-250=+4; 251-300=+6; 301-350=+8, over 350=+10 units 1/31/19   Sheryl Madison NP   insulin degludec-liraglutide (XULTOPHY 100/3.6) 100 unit-3.6 mg /mL (3 mL) InPn Inject 42 Units into the skin once daily. 2/6/19   Sheryl Madison NP   loratadine (CLARITIN) 10 mg tablet Take 10 mg by mouth daily as  "needed for Allergies.    Historical Provider, MD   losartan (COZAAR) 25 MG tablet Take 1 tablet (25 mg total) by mouth once daily. 3/12/19 3/11/20  Francisca Echeverria MD   miconazole NITRATE 2 % (MICOTIN) 2 % top powder Apply topically 2 (two) times daily. 12/19/18   Kirsten Trinh MD   MULTIVIT,THER IRON,CA,FA & MIN (MULTIVITAMIN) Tab Take 1 tablet by mouth every morning.     Historical Provider, MD   oxyCODONE-acetaminophen (PERCOCET) 5-325 mg per tablet Take 1 tablet by mouth every 6 (six) hours as needed for Pain. 7/17/19 7/31/19  Rubén Davis MD   pen needle, diabetic 31 gauge x 1/4" Ndle Use as directed 8/11/16   Mike Bass MD   torsemide (DEMADEX) 20 MG Tab TAKE 4 TABLETS BY MOUTH TWICE DAILY 7/5/19   Rubén Davis MD     Anticoagulants/Antiplatelets: no anticoagulation    Allergies:   Review of patient's allergies indicates:   Allergen Reactions    Statins-hmg-coa reductase inhibitors      Generalized Pain    Onglyza [saxagliptin]     Penicillins Rash     Sedation History:  no adverse reactions     Review of Systems:   Hematological: no known coagulopathies  Respiratory: positive for - shortness of breath  Cardiovascular: positive for - dyspnea on exertion, edema and shortness of breath  Gastrointestinal: positive for - abdominal pain without tenderness  Genito-Urinary: no dysuria, trouble voiding, or hematuria  Musculoskeletal: negative  Neurological: no TIA or stroke symptoms         OBJECTIVE:     Vital Signs (Most Recent)       Physical Exam:  ASA: III  Mallampati: III     General: no acute distress  Mental Status: alert and oriented to person, place and time  HEENT: normocephalic, atraumatic  Chest: mild labored breathing  Heart: regular heart rate  Abdomen: nontender. +distended  Extremity: moves all extremities    Laboratory  Lab Results   Component Value Date    INR 1.1 05/31/2019       Lab Results   Component Value Date    WBC 6.61 05/31/2019    HGB 9.3 (L) 05/31/2019    HCT " 29.1 (L) 05/31/2019    MCV 86 05/31/2019     05/31/2019      Lab Results   Component Value Date     (H) 06/10/2019     06/10/2019    K 4.2 06/10/2019     06/10/2019    CO2 28 06/10/2019    BUN 51 (H) 06/10/2019    CREATININE 1.3 06/10/2019    CALCIUM 8.9 06/10/2019    MG 2.1 05/31/2019    ALT 15 03/25/2019    AST 14 03/25/2019    ALBUMIN 2.7 (L) 06/10/2019    BILITOT 0.3 03/25/2019       ASSESSMENT/PLAN:   57 y/o M with CKD III and combined systolic/diastolic CHF complicated by recurrent abdominal distension and pain 2/2 recurrent painful, tense ascites requiring frequent large-volume therapeutic paracentesis.      1. Will attempt U/S-guided therapeutic paracentesis with local anesthetic only and albumin infusion post per protocol.      Risks (including, but not limited to, pain, bleeding, infection, damage to nearby structures, failure to obtain sufficient material for a diagnosis, the need for additional procedures, and death), benefits, and alternatives were discussed with the patient. All questions were answered to the best of my abilities. The patient wishes to proceed with the procedure. Written informed consent was obtained.     Thank you for considering IR for the care of your patient.      Zach Cha MD  Interventional Radiology

## 2019-07-22 NOTE — DISCHARGE SUMMARY
Radiology Discharge Summary      Hospital Course: No complications    Admit Date: 7/22/2019  Discharge Date: 07/22/2019     Instructions Given to Patient: Yes  Diet: Resume prior diet  Activity: activity as tolerated and no driving for today    Description of Condition on Discharge: Stable  Vital Signs (Most Recent):      Discharge Disposition: Home    Discharge Diagnosis:   CKD III and combined systolic/diastolic CHF complicated by recurrent abdominal distension and pain 2/2 recurrent painful, tense ascites s/p successful U/S-guided therapeutic LVP with local anesthetic only. Albumin infusion post not indicated per protocol.      Patient tolerated the procedure well. No immediate post-procedural complications noted.     Thank you for considering IR for the care of your patient.     Zach Cha MD  Interventional Radiology

## 2019-07-23 ENCOUNTER — HOSPITAL ENCOUNTER (OUTPATIENT)
Dept: WOUND CARE | Facility: HOSPITAL | Age: 59
Discharge: HOME | End: 2019-07-23
Attending: FAMILY MEDICINE
Payer: COMMERCIAL

## 2019-07-23 DIAGNOSIS — I70.244 ATHEROSCLEROSIS OF NATIVE ARTERY OF LEFT LOWER EXTREMITY WITH ULCERATION OF MIDFOOT: ICD-10-CM

## 2019-07-23 DIAGNOSIS — S91.302D OPEN WOUND OF FOOT, LEFT, SUBSEQUENT ENCOUNTER: Primary | ICD-10-CM

## 2019-07-23 PROCEDURE — 97606 NEG PRS WND THER DME>50 SQCM: CPT | Performed by: FAMILY MEDICINE

## 2019-07-23 PROCEDURE — 99214 PR OFFICE/OUTPT VISIT, EST, LEVL IV, 30-39 MIN: ICD-10-PCS | Mod: ,,, | Performed by: FAMILY MEDICINE

## 2019-07-23 PROCEDURE — 99214 OFFICE O/P EST MOD 30 MIN: CPT | Mod: ,,, | Performed by: FAMILY MEDICINE

## 2019-07-24 ENCOUNTER — TELEPHONE (OUTPATIENT)
Dept: PLASTIC SURGERY | Facility: CLINIC | Age: 59
End: 2019-07-24

## 2019-07-24 ENCOUNTER — PATIENT OUTREACH (OUTPATIENT)
Dept: ADMINISTRATIVE | Facility: OTHER | Age: 59
End: 2019-07-24

## 2019-07-24 NOTE — TELEPHONE ENCOUNTER
spoke with pt sister and she stated that she didnt actually request an earlier appt // but wound care measured and the wound was smaller.         ----- Message from Lupe Zhang RN sent at 7/23/2019  5:32 PM CDT -----  Contact: Pt      ----- Message -----  From: Mitch Deleon  Sent: 7/23/2019   3:03 PM  To: Emily Lauren Staff    Needs Advice    Reason for call:Please contact the Pt if an earlier appt becomes available.  The Pt's wound measurements are smaller and they would like an appt please.  Currently the Pt is scheduled for an appt on 8/22/19 @ 1:30        Communication Preference:[phone]    Additional Information:

## 2019-07-26 ENCOUNTER — PROCEDURE VISIT (OUTPATIENT)
Dept: OPHTHALMOLOGY | Facility: CLINIC | Age: 59
End: 2019-07-26
Payer: COMMERCIAL

## 2019-07-26 DIAGNOSIS — H40.52X3 NEOVASCULAR GLAUCOMA OF LEFT EYE, SEVERE STAGE: ICD-10-CM

## 2019-07-26 DIAGNOSIS — E78.5 HYPERLIPIDEMIA LDL GOAL <70: ICD-10-CM

## 2019-07-26 DIAGNOSIS — H40.51X1 NEOVASCULAR GLAUCOMA, RIGHT EYE, MILD STAGE: ICD-10-CM

## 2019-07-26 DIAGNOSIS — H35.373 EPIRETINAL MEMBRANE, BOTH EYES: ICD-10-CM

## 2019-07-26 PROCEDURE — 92014 COMPRE OPH EXAM EST PT 1/>: CPT | Mod: 25,S$GLB,, | Performed by: OPHTHALMOLOGY

## 2019-07-26 PROCEDURE — 92134 POSTERIOR SEGMENT OCT RETINA (OCULAR COHERENCE TOMOGRAPHY)-BOTH EYES: ICD-10-PCS | Mod: S$GLB,,, | Performed by: OPHTHALMOLOGY

## 2019-07-26 PROCEDURE — 92134 CPTRZ OPH DX IMG PST SGM RTA: CPT | Mod: S$GLB,,, | Performed by: OPHTHALMOLOGY

## 2019-07-26 PROCEDURE — 67028 PR INJECT INTRAVITREAL PHARMCOLOGIC: ICD-10-PCS | Mod: RT,S$GLB,, | Performed by: OPHTHALMOLOGY

## 2019-07-26 PROCEDURE — 67028 INJECTION EYE DRUG: CPT | Mod: RT,S$GLB,, | Performed by: OPHTHALMOLOGY

## 2019-07-26 PROCEDURE — 92014 PR EYE EXAM, EST PATIENT,COMPREHESV: ICD-10-PCS | Mod: 25,S$GLB,, | Performed by: OPHTHALMOLOGY

## 2019-07-26 RX ORDER — EZETIMIBE 10 MG/1
TABLET ORAL
Qty: 90 TABLET | Refills: 0 | Status: SHIPPED | OUTPATIENT
Start: 2019-07-26 | End: 2019-10-23 | Stop reason: SDUPTHER

## 2019-07-26 RX ADMIN — Medication 1.25 MG: at 12:07

## 2019-07-26 NOTE — PATIENT INSTRUCTIONS

## 2019-07-26 NOTE — PROGRESS NOTES
HPI     DLS: 5/24/19---Dr Cortés  DLS 4/26/19     3 mons ck     Pt states that VA in OD is stable, OS has off and on blur vision   -eye pain   -f/f     Meds: Cosopt BID OU   Alphagan BID OU         OCT : OD shows central DME, stable   OS ERM with DME central cyst, stable    Prior FA: shows macular leakage OU, significant NP OU, no NV OU, enlarged SURINDER OU    A/P    1. PDR s/p PRP OU (DM2), with CSME OU (and HTN)  - Encouraged good BS/BP/Cholesterol control  T2 uncontrolled on insulin  S/p PRP fill in 11/18    2. DME OU, OD>OS  - S/p Avastin OD x 18, OS x 7  - Will monitor OS for now given NVG and ERM  - Could consider Ozurdex but pt is POAG and phakic, IOP good right now)  With some NVI/NVA post CE OD  Will inject avastin q 3 month for now    - Avastin OD today      2. NVG OS   FH:   CCT:449 // 540    Gonio: OD CBB; OS sup CBB/nasal small NV at 9:00/inf PAS/temp CBB with PAS   Surgeries:;  phaco/BV 2012   Lasers: SLT   Tmax:    Tbase (before treatment):   Ttarget:     Drop intolerance:    APD:     - Stopped Xalatan OD due to macular edema  - pt reports good adherence  - No NVA OD. 1 NVA vessel at 9 oclock OS  - Cont drops as above    Sees KL    3. PCIOL OD  Some NVA noted after CE      4. PSK OS  - Stable, CTM, RD precautions       5. ERM OS>OD  - Given NVG hx, monitor  - CTM      3 months OCT    Risks, benefits, and alternatives to treatment discussed in detail with the patient.  The patient voiced understanding and wished to proceed with the procedure    Injection Procedure Note:  Diagnosis: PDR/NVG and DME OD    Patient Identified and Time Out complete  Topical Proparacaine and Betadine.  Inject Avastin OD at 6:00 @ 3.5-4mm posterior to limbus  Post Operative Dx: Same  Complications: None  Follow up as above.

## 2019-07-29 ENCOUNTER — HOSPITAL ENCOUNTER (OUTPATIENT)
Dept: CARDIOLOGY | Facility: HOSPITAL | Age: 59
Discharge: HOME | End: 2019-07-29
Attending: SURGERY
Payer: COMMERCIAL

## 2019-07-29 ENCOUNTER — OFFICE VISIT (OUTPATIENT)
Dept: VASCULAR SURGERY | Facility: CLINIC | Age: 59
End: 2019-07-29
Payer: COMMERCIAL

## 2019-07-29 ENCOUNTER — PATIENT OUTREACH (OUTPATIENT)
Dept: ADMINISTRATIVE | Facility: OTHER | Age: 59
End: 2019-07-29

## 2019-07-29 VITALS
BODY MASS INDEX: 27.87 KG/M2 | WEIGHT: 183.88 LBS | HEART RATE: 87 BPM | HEIGHT: 68 IN | DIASTOLIC BLOOD PRESSURE: 66 MMHG | SYSTOLIC BLOOD PRESSURE: 110 MMHG

## 2019-07-29 DIAGNOSIS — I70.244 ATHEROSCLEROSIS OF NATIVE ARTERY OF LEFT LOWER EXTREMITY WITH ULCERATION OF MIDFOOT: ICD-10-CM

## 2019-07-29 DIAGNOSIS — S91.302D OPEN WOUND OF FOOT, LEFT, SUBSEQUENT ENCOUNTER: Primary | ICD-10-CM

## 2019-07-29 DIAGNOSIS — I70.245 ATHEROSCLEROSIS OF NATIVE ARTERY OF LEFT LOWER EXTREMITY WITH ULCERATION OF OTHER PART OF FOOT: ICD-10-CM

## 2019-07-29 DIAGNOSIS — I73.9 PVD (PERIPHERAL VASCULAR DISEASE): ICD-10-CM

## 2019-07-29 PROCEDURE — 99999 PR PBB SHADOW E&M-EST. PATIENT-LVL IV: ICD-10-PCS | Mod: PBBFAC,,, | Performed by: SURGERY

## 2019-07-29 PROCEDURE — 93922 CV US ANKLE BRACHIAL INDICES WO STRESS W TOE TRACINGS (CUPID ONLY): ICD-10-PCS | Mod: 26,,, | Performed by: SURGERY

## 2019-07-29 PROCEDURE — 93922 UPR/L XTREMITY ART 2 LEVELS: CPT | Mod: 26,,, | Performed by: SURGERY

## 2019-07-29 PROCEDURE — 99024 PR POST-OP FOLLOW-UP VISIT: ICD-10-PCS | Mod: S$GLB,,, | Performed by: SURGERY

## 2019-07-29 PROCEDURE — 93925 CV US DOPPLER ARTERIAL LEGS BILATERAL (CUPID ONLY): ICD-10-PCS | Mod: 26,,, | Performed by: SURGERY

## 2019-07-29 PROCEDURE — 99024 POSTOP FOLLOW-UP VISIT: CPT | Mod: S$GLB,,, | Performed by: SURGERY

## 2019-07-29 PROCEDURE — 99999 PR PBB SHADOW E&M-EST. PATIENT-LVL IV: CPT | Mod: PBBFAC,,, | Performed by: SURGERY

## 2019-07-29 PROCEDURE — 93925 LOWER EXTREMITY STUDY: CPT | Mod: 26,,, | Performed by: SURGERY

## 2019-07-29 PROCEDURE — 93922 UPR/L XTREMITY ART 2 LEVELS: CPT | Mod: 50

## 2019-07-29 PROCEDURE — 93925 LOWER EXTREMITY STUDY: CPT | Mod: 50

## 2019-07-29 NOTE — PROGRESS NOTES
Mitch Chan MD [facility]                         07/29/2019    HPI:  Marvin Ray is a 59 y.o. male with   Patient Active Problem List   Diagnosis    Epiretinal membrane, both eyes    Nuclear sclerotic cataract of right eye    Pseudophakia, left eye    Ptosis, left eyelid    DM type 2 with diabetic peripheral neuropathy    HLD (hyperlipidemia)    Neovascular glaucoma of both eyes    Tophaceous gout    Essential hypertension    CAD (coronary artery disease)    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 edema, with long-term current use of insulin    Neovascular glaucoma of left eye, severe stage    Statin myopathy    Neovascular glaucoma, right eye, mild stage    Left leg cellulitis    PVD (peripheral vascular disease)    Right wrist pain    Multiple open wounds of lower leg    Hepatosplenomegaly    Non-pressure chronic ulcer of other part of left foot with fat layer exposed    Type 2 diabetes mellitus with left diabetic foot ulcer    Open wound of left foot    Cellulitis of left lower extremity    Diabetic foot infection    Other ascites    Severe malnutrition    Goals of care, counseling/discussion    Alteration in skin integrity    MDR Acinetobacter baumannii infection    Takes dietary supplements    Intertriginous dermatitis associated with moisture    Debility    Infection due to multidrug-resistant Pseudomonas aeruginosa    Subacute osteomyelitis of left foot    Ascites    Acute on chronic combined systolic and diastolic congestive heart failure    Stage 3 chronic kidney disease    Atherosclerosis of left lower extremity with ulceration of midfoot    CKD stage 3 due to type 2 diabetes mellitus    Azotemia    Hyponatremia    Anemia due to multiple mechanisms    Persistent proteinuria    Hypokalemia    Open wound of foot, left, subsequent  encounter    being managed by PCP and specialists who is here today for evaluation of L foot wound.  Seen in hospital last mo with LLE cellulitis.  Treated with Abx.  Also had paracentesis for ascites with negative w/u per family.  Patient states location is L foot occurring for 1-2 mo, worsening foot wound although improvement in edema and erythema.  Associated signs and symptoms include pain and edema.  Quality is aching and severity is 5/10.  Symptoms began 10/2018 spontaneously with blistering and severe edema.  Alleviating factors include wound care and elevation.  Worsening factors include pressure.    Tobacco use: denies    1/4/19: s/p LLE angioplasty and multiple subsequent OR and bedside debridements.  On IV Abx per culture results.  Glucose better controlled.  No fevers.  Receiving local wound care with Santyl.    1/14/19:  Cont to receive local wound care.  Pseudomonas in tissue and bone cultures multidrug resistant other than aminoglycoside; d/w Dr. Velez with high risk of ESRD with 6 weeks of aminoglycoside treatment.  No F/C.    1/31/19:  Pt without complaints.  Was started back on Bactrim.  Family doing wound care.    2/28/19: S/p L foot debridement 2/13/19.  Started on Meropenem.    3/28/19:  No complaints.  S/p paracentesis and pt feels better.    5/16/19:  S/p L peroneal and AT angioplasty, wound healing well.    Interval history:  S/p L foot debridement 6/5/19. WV placed.    Past Medical History:   Diagnosis Date    Arthritis     Cellulitis     CKD (chronic kidney disease), stage III     Coronary artery disease     Diabetes mellitus     Diabetic retinopathy     Diabetic ulcer of left foot     Glaucoma     Gout     Hyperlipemia     Hypertension     ICD (implantable cardioverter-defibrillator) in place 11/02/2018    Left chest    Non-pressure chronic ulcer of other part of left foot with fat layer exposed 10/23/2018    PVD (peripheral vascular disease)     Type 2 diabetes mellitus  with left diabetic foot ulcer 10/29/2018    Unsteady gait     uses a wheelchair     Past Surgical History:   Procedure Laterality Date    AHMED GLAUCOMA IMPLANT Left 2011    DONE AT [facility]    AMPUTATION, TOE Left 12/5/2018    [facility]formed by [facility] at [facility]    AMPUTATION, TOES  2-5 Left 11/16/2018    [facility] at [facility]    AORTOGRAM, WITH SERIALOGRAPHY, LEFT LOWER EXTREMITY, POSSIBLE INTERVENTION Left 4/30/2019    [facility] at [facility]    BAERVELDT GLAUCOMA IMPLANT Left 2012    WITH CATARACT EXTRACTION//DONE AT [facility]    CARDIAC CATHETERIZATION Left 05/2016    CARDIAC DEFIBRILLATOR PLACEMENT Left 11/02/2018    CATARACT EXTRACTION W/  INTRAOCULAR LENS IMPLANT Left 2012    WITH BAERVEDT//DONE AT [facility]    CATARACT EXTRACTION W/  INTRAOCULAR LENS IMPLANT Right 09/26/2018    COMPLEX ()    CB DESTRUCTION WITH CYCLO G6 Left 02/15/2017        CYST REMOVAL      DEBRIDEMENT, FOOT  6/5/2019    [facility] at [facility]    DEBRIDEMENT, FOOT  12/28/2018    [facility] by [facility] at [facility]    Exam under anesthesia, left foot debridement, left foot washout, possible left second through fifth metatarsal resection Left 12/28/2018    [facility] by [facility] at [facility] OR    Exam under anesthesia, left foot debridement, washout and all other indicated procedures Left 12/5/2018    [facility] by [facility] at [facility] OR    EXCISION, LESION, METATARSAL BONE  12/28/2018    [facility] by [facility] at [facility]    HEART CATH-LEFT N/A 5/6/2016    [facility] CATH LAB    INCISION AND DRAINAGE Left 11/16/2018    [facility] by [facility] at [facility] OR    Incision and Drainage, left lower extremity debridement, washout Left 11/14/2018    [facility] by [facility] at [facility] OR    INSERTION, ICD GENERATOR, SINGLE  CHAMBER N/A 11/2/2018    Performed by Pernell Greer MD at [facility] CATH LAB    INSERTION, IOL PROSTHESIS Right 9/26/2018    Performed by Perla Cortés [facility] at [facility] OR 1ST FLR    LEFT FOOT DEBRIDEMENT, WASHOUT AND ALL OTHER INDICATED PROCEDURES Left 6/5/2019    Per[facility] by Mitch Chan [facility] at [facility] OR    LEFT FOOT WOUND DEBRIDEMENT, WASHOUT AND ALL OTHER INDICATED PROCEDURES Left 2/13/2019    Performed by Mitch Chan [facility] at [facility] OR    PARACENTESIS, ABDOMINAL, INITIAL N/A 7/22/2019    Performed by [facility] OR    PARACENTESIS, ABDOMINAL, INITIAL N/A 3/25/2019    [facility] by [facility] OR    PARACENTESIS, ABDOMINAL, INITIAL N/A 3/1/2019    [facility]formed by [facility] OR    PARACENTESIS, ABDOMINAL, REPEAT N/A 2/11/2019    [facility]formed by [facility] OR    PHACOEMULSIFICATION, CATARACT Right 9/26/2018    Performed by Per[facility] at [facility] OR 1ST FLR    Right foot surgery  10/2014    TOE AMPUTATION Right     first and second    TONSILLECTOMY      TRABECULECTOMY/G 6 LASER Left 2/15/2017    Performed by [facility] at [facility] OR 1ST FLR    WASHOUT  6/5/2019    Performed by Mitch Chan MD at [facility] OR     Family History   Problem Relation Age of Onset    Diabetes Mother     Heart disease Brother     No Known Problems Father     No Known Problems Sister     No Known Problems Maternal Aunt     No Known Problems Maternal Uncle     No Known Problems Paternal Aunt     No Known Problems Paternal Uncle     No Known Problems Maternal Grandmother     No Known Problems Maternal Grandfather     No Known Problems Paternal Grandmother     No Known Problems Paternal Grandfather     Anemia Neg Hx     Arrhythmia Neg Hx     Asthma Neg Hx     Clotting disorder Neg Hx     Fainting Neg Hx     Heart attack Neg Hx     Heart failure Neg Hx     Hyperlipidemia Neg Hx     Hypertension Neg Hx      Stroke Neg Hx     Atrial Septal Defect Neg Hx     Amblyopia Neg Hx     Blindness Neg Hx     Glaucoma Neg Hx     Macular degeneration Neg Hx     Retinal detachment Neg Hx     Strabismus Neg Hx      Social History     Socioeconomic History    Marital status: Single     Spouse name: Not on file    Number of children: Not on file    Years of education: 14    Highest education level: Not on file   Occupational History    Not on file   Social Needs    Financial resource strain: Not on file    Food insecurity:     Worry: Not on file     Inability: Not on file    Transportation needs:     Medical: Not on file     Non-medical: Not on file   Tobacco Use    Smoking status: Former Smoker     Packs/day: 1.00     Years: 3.00     Pack years: 3.00     Types: Cigarettes     Last attempt to quit: 1984     Years since quittin.0    Smokeless tobacco: Never Used   Substance and Sexual Activity    Alcohol use: Not Currently     Alcohol/week: 0.6 oz     Types: 1 Cans of beer per week     Comment: rarely    Drug use: No    Sexual activity: Not Currently     Partners: Female   Lifestyle    Physical activity:     Days per week: Not on file     Minutes per session: Not on file    Stress: Not on file   Relationships    Social connections:     Talks on phone: Not on file     Gets together: Not on file     Attends [employer] service: Not on file     Active member of club or organization: Not on file     Attends meetings of clubs or organizations: Not on file     Relationship status: Not on file   Other Topics Concern    Not on file   Social History Narrative    Not on file       Current Outpatient Medications:     allopurinol (ZYLOPRIM) 300 MG tablet, Take 1 tablet (300 mg total) by mouth once daily., Disp: 30 tablet, Rfl: 3    aspirin 325 MG tablet, Take 325 mg by mouth once daily. , Disp: , Rfl:     bisacodyl (DULCOLAX) 5 mg EC tablet, Take 5 mg by mouth daily as needed for Constipation., Disp: , Rfl:  "    brimonidine 0.2% (ALPHAGAN) 0.2 % Drop, Place 1 drop into both eyes 2 (two) times daily., Disp: 10 mL, Rfl: 11    carvedilol (COREG) 3.125 MG tablet, Take 1 tablet (3.125 mg total) by mouth 2 (two) times daily., Disp: 60 tablet, Rfl: 11    coenzyme Q10 100 mg capsule, Take 100 mg by mouth every morning., Disp: , Rfl:     dorzolamide-timolol 2-0.5% (COSOPT) 22.3-6.8 mg/mL ophthalmic solution, Place 1 drop into both eyes 2 (two) times daily., Disp: 1 Bottle, Rfl: 11    ezetimibe (ZETIA) 10 mg tablet, TAKE 1 TABLET(10 MG) BY MOUTH EVERY DAY, Disp: 90 tablet, Rfl: 0    fish oil-omega-3 fatty acids 300-1,000 mg capsule, Take 1 capsule by mouth 2 (two) times daily., Disp: 60 capsule, Rfl: 3    insulin aspart U-100 (NOVOLOG) 100 unit/mL InPn pen, Use on premeal readings: 150-200=+2, 201-250=+4; 251-300=+6; 301-350=+8, over 350=+10 units, Disp: 2.7 mL, Rfl: 11    insulin degludec-liraglutide (XULTOPHY 100/3.6) 100 unit-3.6 mg /mL (3 mL) InPn, Inject 42 Units into the skin once daily., Disp: 5 Syringe, Rfl: 5    MULTIVIT,THER IRON,CA,FA & MIN (MULTIVITAMIN) Tab, Take 1 tablet by mouth every morning. , Disp: , Rfl:     oxyCODONE-acetaminophen (PERCOCET) 5-325 mg per tablet, Take 1 tablet by mouth every 6 (six) hours as needed for Pain., Disp: 30 tablet, Rfl: 0    pen needle, diabetic 31 gauge x 1/4" Ndle, Use as directed, Disp: 100 each, Rfl: 11    torsemide (DEMADEX) 20 MG Tab, TAKE 4 TABLETS BY MOUTH TWICE DAILY, Disp: 120 tablet, Rfl: 0  No current facility-administered medications for this visit.     Facility-Administered Medications Ordered in Other Visits:     clindamycin 900 MG/50 ML D5W 900 mg/50 mL IVPB 900 mg, 900 mg, Intravenous, Q8H, Mitch Chan MD, 900 mg at 06/05/19 1407    lactated ringers infusion, , Intravenous, Continuous, Tam Reynolds MD    lidocaine (PF) 10 mg/ml (1%) injection 10 mg, 1 mL, Intradermal, Once, Tam Reynolds MD    REVIEW OF SYSTEMS:  General: No fevers or " chills; ENT: No sore throat; Allergy and Immunology: no persistent infections; Hematological and Lymphatic: No history of bleeding or easy bruising; Endocrine: negative; Respiratory: no cough, shortness of breath, or wheezing; Cardiovascular: no chest pain or dyspnea on exertion; Gastrointestinal: no abdominal pain/back, change in bowel habits, or bloody stools; Genito-Urinary: no dysuria, trouble voiding, or hematuria; Musculoskeletal: negative; Neurological: no TIA or stroke symptoms; Psychiatric: no nervousness, anxiety or depression.    PHYSICAL EXAM:      Pulse: 87         General appearance:  Alert, well-appearing, and in no distress.  Oriented to person, place, and time                    Neurological: Normal speech, no focal findings noted; CN II - XII grossly intact. RLE with sensation to light touch, LLE with sensation to light touch.            Musculoskeletal: Digits/nail without cyanosis/clubbing.  Strength 5/5 BLE.                    Neck: Supple, no significant adenopathy                  Chest:  Clear to auscultation, no wheezes, rales or rhonchi, symmetric air entry. No use of accessory muscles               Cardiac: Normal rate and regular rhythm, S1 and S2 normal            Abdomen: Soft, nontender, nondistended, no masses or organomegaly, no hernia     No rebound tenderness noted; bowel sounds normal     No groin adenopathy      Extremities:   2+ R femoral pulse, 2+ L femoral pulse     doppler+ R popliteal pulse, doppler+ L popliteal pulse     doppler+ R PT pulse, doppler+ L PT pulse     doppler+ R DP pulse, doppler+ L DP pulse     1+ RLE edema, 2+ LLE edema    Skin: LLE with minimal brawny edema, +large foot wound with markedly improved granulation tissue, no odor, no purulence or erythema    LAB RESULTS:  No results found for: CBC  Lab Results   Component Value Date    LABPROT 11.2 05/31/2019    INR 1.1 05/31/2019     Lab Results   Component Value Date     06/10/2019    K 4.2 06/10/2019      06/10/2019    CO2 28 06/10/2019     (H) 06/10/2019    BUN 51 (H) 06/10/2019    CREATININE 1.3 06/10/2019    CALCIUM 8.9 06/10/2019    ANIONGAP 8 06/10/2019    EGFRNONAA 59 (A) 06/10/2019     Lab Results   Component Value Date    WBC 6.61 05/31/2019    RBC 3.37 (L) 05/31/2019    HGB 9.3 (L) 05/31/2019    HCT 29.1 (L) 05/31/2019    MCV 86 05/31/2019    MCH 27.6 05/31/2019    MCHC 32.0 05/31/2019    RDW 20.8 (H) 05/31/2019     05/31/2019    MPV 8.8 (L) 05/31/2019    GRAN 4.6 05/31/2019    GRAN 69.0 05/31/2019    LYMPH 0.8 (L) 05/31/2019    LYMPH 12.0 (L) 05/31/2019    MONO 0.8 05/31/2019    MONO 12.1 05/31/2019    EOS 0.4 05/31/2019    BASO 0.03 05/31/2019    EOSINOPHIL 6.4 05/31/2019    BASOPHIL 0.5 05/31/2019    DIFFMETHOD Automated 05/31/2019     .  Lab Results   Component Value Date    HGBA1C 6.0 (H) 07/22/2019       IMAGING:  All pertinent imaging has been reviewed and interpreted independently.    BLE arterial US 1/2019: No focal stenosis    5/16/19: BLE with no HD significant stenosis, SIDRA 0.8/1.46, left ankle pressures 60 and 171    7/29/19: BLE arterial US with approx 50% R TPT stenosis, SIDRA 0.8; LLE showing no HD significant stenosis, SIDRA 0.66    IMP/PLAN:  59 y.o. male with   Patient Active Problem List   Diagnosis    Epiretinal membrane, both eyes    Nuclear sclerotic cataract of right eye    Pseudophakia, left eye    Ptosis, left eyelid    DM type 2 with diabetic peripheral neuropathy    HLD (hyperlipidemia)    Neovascular glaucoma of both eyes    Tophaceous gout    Essential hypertension    CAD (coronary artery disease)    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 edema, with long-term current use of insulin    Neovascular glaucoma of left eye, severe stage    Statin myopathy    Neovascular glaucoma, right eye, mild stage    Left leg cellulitis    PVD (peripheral vascular disease)    Right wrist pain     Multiple open wounds of lower leg    Hepatosplenomegaly    Non-pressure chronic ulcer of other part of left foot with fat layer exposed    Type 2 diabetes mellitus with left diabetic foot ulcer    Open wound of left foot    Cellulitis of left lower extremity    Diabetic foot infection    Other ascites    Severe malnutrition    Goals of care, counseling/discussion    Alteration in skin integrity    MDR Acinetobacter baumannii infection    Takes dietary supplements    Intertriginous dermatitis associated with moisture    Debility    Infection due to multidrug-resistant Pseudomonas aeruginosa    Subacute osteomyelitis of left foot    Ascites    Acute on chronic combined systolic and diastolic congestive heart failure    Stage 3 chronic kidney disease    Atherosclerosis of left lower extremity with ulceration of midfoot    CKD stage 3 due to type 2 diabetes mellitus    Azotemia    Hyponatremia    Anemia due to multiple mechanisms    Persistent proteinuria    Hypokalemia    Open wound of foot, left, subsequent encounter    being managed by PCP and specialists who is here today for evaluation of L foot wound.    -L foot wound improved s/p debridement 2/13/19, multifactorial due to diabetes, PVD and venous insufficiency with improvement in granulation tissue on Abx due to multidrug resistent bacteria in the past s/p debridements and L tibial angioplasty x 2 with improvement in wound +granulation tissue s/p debridement 6/5/19 and WV placement - will discuss further with Plastic Surgery re: grafting / skin grafting vs continuing WV and allowing to heal by secondary intention (photos in media)  -Cont ID rec Abx regimen  -Recommend continued wound care center care   -Rec BLE Xeroform and dry kerlix wraps twice daily to shin regions with elevation of LLE above level of the heart  -Low sodium diet  -Strict glycemic control  -RTC 4-6 weeks for continued evaluation    I spent 20 minutes evaluating this  patient and greater than 50% of the time was spent counseling, coordinator care and discussing the plan of care.  All questions were answered and patient stated understanding with agreement with the above treatment plan.    Mitch Chan MD [facility]  Vascular and Endovascular Surgery

## 2019-07-29 NOTE — LETTER
August 2, 2019        Rubén Davis MD  [address] [facility] [address]             [facility]  [address] [facility] [address]  [facility] [address]  Phone: [phone]  Fax: [phone]   Patient: Marvin Ray   MR Number: [id number]   YOB: 1960   Date of Visit: 7/29/2019       Dear Dr. Davis:    Thank you for referring Marvin Ray to me for evaluation. Below are the relevant portions of my assessment and plan of care.            If you have questions, please do not hesitate to call me. I look forward to following Marvin along with you.    Sincerely,      Mitch Chan MD           CC  No Recipients

## 2019-07-30 ENCOUNTER — HOSPITAL ENCOUNTER (OUTPATIENT)
Dept: WOUND CARE | Facility: HOSPITAL | Age: 59
Discharge: HOME | End: 2019-07-30
Attending: FAMILY MEDICINE
Payer: COMMERCIAL

## 2019-07-30 DIAGNOSIS — L08.9 DIABETIC FOOT INFECTION: ICD-10-CM

## 2019-07-30 DIAGNOSIS — E11.621 TYPE 2 DIABETES MELLITUS WITH LEFT DIABETIC FOOT ULCER: Primary | ICD-10-CM

## 2019-07-30 DIAGNOSIS — I70.244 ATHEROSCLEROSIS OF NATIVE ARTERY OF LEFT LOWER EXTREMITY WITH ULCERATION OF MIDFOOT: ICD-10-CM

## 2019-07-30 DIAGNOSIS — L97.529 TYPE 2 DIABETES MELLITUS WITH LEFT DIABETIC FOOT ULCER: Primary | ICD-10-CM

## 2019-07-30 DIAGNOSIS — E11.628 DIABETIC FOOT INFECTION: ICD-10-CM

## 2019-07-30 LAB
LEFT ABI: 1.84
LEFT ANT TIBIAL SYS PSV: 129 CM/S
LEFT ARM BP: 129 MMHG
LEFT CFA PSV: 126 CM/S
LEFT DORSALIS PEDIS: 254 MMHG
LEFT EXTERNAL ILIAC PSV: 175 CM/S
LEFT PERONEAL SYS PSV: 64 CM/S
LEFT POPLITEAL PSV: 151 CM/S
LEFT POST TIBIAL SYS PSV: 38 CM/S
LEFT POSTERIOR TIBIAL: 91 MMHG
LEFT PROFUNDA SYS PSV: 112 CM/S
LEFT SUPER FEMORAL DIST SYS PSV: 99 CM/S
LEFT SUPER FEMORAL MID SYS PSV: 181 CM/S
LEFT SUPER FEMORAL OSTIAL SYS PSV: 157 CM/S
LEFT SUPER FEMORAL PROX SYS PSV: 145 CM/S
LEFT TIB/PER TRUNK SYS PSV: 162 CM/S
OHS CV LEFT LOWER EXTREMITY ABI (NO CALC): 0.66
OHS CV RIGHT ABI LOWER EXTREMITY (NO CALC): 0.84
RIGHT ABI: 1.84
RIGHT ANT TIBIAL SYS PSV: 49 CM/S
RIGHT ARM BP: 138 MMHG
RIGHT CFA PSV: 91 CM/S
RIGHT DORSALIS PEDIS: 254 MMHG
RIGHT EXTERNAL ILLIAC PSV: 119 CM/S
RIGHT PERONEAL SYS PSV: 80 CM/S
RIGHT POPLITEAL PSV: 96 CM/S
RIGHT POST TIBIAL SYS PSV: 40 CM/S
RIGHT POSTERIOR TIBIAL: 116 MMHG
RIGHT PROFUNDA SYS PSV: 98 CM/S
RIGHT SUPER FEMORAL DIST SYS PSV: 69 CM/S
RIGHT SUPER FEMORAL MID SYS PSV: 140 CM/S
RIGHT SUPER FEMORAL OSTIAL SYS PSV: 108 CM/S
RIGHT SUPER FEMORAL PROX SYS PSV: 99 CM/S
RIGHT TIB/PER TRUNK SYS PSV: 206 CM/S

## 2019-07-30 PROCEDURE — 99214 OFFICE O/P EST MOD 30 MIN: CPT | Mod: ,,, | Performed by: FAMILY MEDICINE

## 2019-07-30 PROCEDURE — 97606 NEG PRS WND THER DME>50 SQCM: CPT | Performed by: FAMILY MEDICINE

## 2019-07-30 PROCEDURE — 99214 PR OFFICE/OUTPT VISIT, EST, LEVL IV, 30-39 MIN: ICD-10-PCS | Mod: ,,, | Performed by: FAMILY MEDICINE

## 2019-08-01 RX ORDER — TORSEMIDE 20 MG/1
TABLET ORAL
Qty: 120 TABLET | Refills: 0 | Status: ON HOLD | OUTPATIENT
Start: 2019-08-01 | End: 2019-08-17 | Stop reason: SDUPTHER

## 2019-08-02 PROBLEM — L97.522 NON-PRESSURE CHRONIC ULCER OF OTHER PART OF LEFT FOOT WITH FAT LAYER EXPOSED: Status: RESOLVED | Noted: 2018-10-23 | Resolved: 2019-08-02

## 2019-08-02 NOTE — PATIENT INSTRUCTIONS
Understanding Peripheral Arterial Disease    Peripheral arteries deliver oxygen-rich blood to the tissues outside the heart. As you age, your arteries become stiffer and thicker. In addition, risk factors, such as smoking and high cholesterol, can damage the artery lining. This allows a buildup of fat and other materials (plaque) to form within the artery walls. The buildup of plaque narrows the space inside the artery and sometimes blocks blood flow. Peripheral arterial disease (PAD) happens when blood flow through the arteries is reduced because of plaque buildup. It often happens in the legs and feet, but can also happen elsewhere in the body. If this buildup happens in the a large artery in the neck (carotid artery), it can be a major contributor to stroke.  A healthy artery  An artery is a muscular tube that carries oxgen rich blood and nutrients from the heart to the rest of the body. It has a smooth lining and flexible walls that allow blood to pass freely. When active, muscles need more oxygen. This increases blood flow. Healthy arteries can adapt to meet this need.  A damaged artery    PAD begins when the lining of an artery is damaged. This is often because of risk factors, such as smoking, older age, or diabetes. Plaque then starts to form within the artery wall. At this stage, blood flows normally, so youre not likely to have symptoms.  A narrowed artery    If plaque continues to build up, the space inside the artery narrows. The artery walls become less able to expand. The artery still provides enough blood and oxygen to your muscles during rest. But when youre active, the increased demand for blood cant be met. As a result, your leg may cramp or ache when you walk.  A blocked artery    An artery can become blocked by plaque or by a blood clot lodged in a narrowed section. When this happens, oxygen cant reach the muscle below the blockage. Then you may feel pain when lying down or when you are not  active (rest pain). This type of pain is especially common at night when youre lying flat. In time, the affected tissue can die. This can lead to the loss of a toe or foot.  Date Last Reviewed: 5/1/2016 © 2000-2017 [employer]. [address] [place] [address]. All rights reserved. This information is not intended as a substitute for professional medical care. Always follow your healthcare professional's instructions.        Peripheral Artery Disease (PAD)     Peripheral artery disease (PAD) occurs when the arteries that carry blood to the limbs are narrowed or blocked. This is usually due to a buildup of a fatty substance called plaque in the walls of the arteries.  PAD most often affects the arteries in the legs. When these arteries are narrowed or blocked, blood flow to the legs is reduced. This can cause leg and foot pain and other symptoms. If severe enough, reduced blood flow to the legs can lead to tissue death (gangrene) and the loss of a toe, foot, or leg. Having PAD also makes it more likely that arteries in other body areas are blocked. For instance, arteries that carry blood to the heart or brain may be affected. This raises the chances of heart attack and stroke.  Risk factors  Certain factors can make PAD more likely. They include:  · Smoking  · Diabetes  · High blood pressure  · Unhealthy cholesterol levels  · Obesity  · Inactive lifestyle  · Older age  · Family history of PAD  Symptoms  Many people with PAD have no symptoms. If symptoms do occur, they can include:  · Pain in the muscles of the calves, thighs or hips that gets worse with activity and better with rest (intermittent claudication)  · Achy, tired, or heavy feeling in the legs  · Weakness, numbness, tingling, or loss of feeling in the legs  · Changes in skin color of the legs  · Sores on the legs and feet  · Cold leg, feet, or toes  · Pain the feet or toes even when lying down (rest pain)  Home care  PAD is a  chronic (lifelong) condition. Treatment is focused on managing your condition and lowering your health risks. This may include doing the following:  · If you smoke, quit. This helps prevent further damage to your arteries and lowers your health risks. Ask your provider about medicines or products that can help you quit smoking. Also consider joining a stop-smoking program or support group.  · Be more active. This helps you lose weight and manage problems such as high blood pressure and unhealthy cholesterol levels. Start a walking program if advised to by your provider. Your provider may also help you form a safe exercise program that is right for your needs.  · Make healthy eating changes. This includes eating less fat, salt, and sugar.  · Take medicines for high blood pressure, unhealthy cholesterol levels, and diabetes as directed.  · Have your blood pressure and cholesterol levels checked as often as directed.  · If you have diabetes, try to keep your blood sugar well controlled. Test your blood sugar as directed.  · If you are overweight, talk to your provider about forming a weight-loss plan.  · Watch for cuts, scrapes, or open sores on your feet. Poor blood flow to the feet may slow healing and increase the risk of infection from these problems.   Follow-up care  Follow up with your healthcare provider, or as advised. If imaging tests such as ultrasound were done, they will be reviewed by a doctor. You will be told the results and any new findings that may affect your care.  When to seek medical advice   Call your healthcare provider right away if any of these occur:  · Sudden severe pain in the legs or feet  · Sudden cold, pale or blue color in the legs or feet  · Weakness or numbness in the legs or feet that worsens  · Any sore or wound in the legs or feet that wont heal  · Weak pulse in your legs or feet  Know the Signs of Heart Attack and Stroke  People with PAD are at high risk for heart attack and  stroke. Knowing the signs of these problems can help you protect your health and get help when you need it. Call 911 right away if you have any of the following:  Signs of a Heart Attack  · Chest discomfort, such as pain, aching, tightness, or pressure that lasts more than a few minutes, or that comes and goes  · Pain or discomfort in the arms, back, shoulders, neck, or jaw  · Shortness of breath  · Sweating (often a cold, clammy sweat)  · Nausea  · Lightheadedness  Signs of a Stroke  · Sudden numbness or weakness of the face, arms, or legs, especially on one side  · Sudden confusion or trouble speaking or understanding  · Sudden trouble seeing in one or both eyes  · Sudden trouble walking, dizziness, or loss of balance  · Sudden, severe headache with no known cause   Date Last Reviewed: 9/21/2015  © 5737-6769 [employer]. [address] [place] [address]. All rights reserved. This information is not intended as a substitute for professional medical care. Always follow your healthcare professional's instructions.        A Walking Program for Peripheral Arterial Disease (PAD)  Peripheral arterial disease (PAD) is a condition where the arteries in the legs are severely damaged. When you have PAD, walking can be painful. So you may start to walk less. Walking less makes your leg muscles weaker. It then becomes harder and more painful to walk. A walking program can break this cycle. This sheet gives you tips on how to get started.     Walking farther without pain  Exercise strengthens leg muscles that are out of shape. It also trains these muscles to work with less oxygen. This helps your leg muscles work better even with reduced blood flow to your legs. When you have PAD, a walking program can be helpful. Your program can:  · Let you walk longer and farther without claudication. This is an ache in your legs during exercise that goes away with rest.  · Let you do more and be more active  · Add to  your overall health and well-being  · Help you control your blood sugar and blood pressure  · Help you become healthier with no risk and at little or no cost  Getting started  Your local hospital, vascular center, or cardiac rehab center may have a special walking program for people with PAD. If so, this is your best option. But if you cant find a program, or its not covered by insurance, you can still walk on your own. Follow these steps at each session:  · Step 1. Start at a pace that lets you walk for 5 to 10 minutes before you start to feel claudication. This feeling is unpleasant, but it doesnt hurt you. Keep going until the pain makes you feel that you need to stop.  · Step 2. Stop and rest for 3 to 5 minutes, just long enough for the pain to go away. You can rest standing or sitting. (Some people like to bring along a cane or a lightweight folding chair.)  · Step 3. Again walk at a pace that lets you walk for 5 to 10 minutes more before you feel pain. This may be slower than your starting pace in step 1. Then rest again.  · Step 4. Repeat this process until youve walked for 45 minutes. This should be about 60 to 80 minutes total, including resting time. You may not be able to do a full 45 minutes at first. Do as much as you can, and increase your time as you improve.  Making the most of your program  · Walk at least 3 times a week. Take no more than 2 days off between sessions. If you stop walking, even for a week or two, you can lose the health benefits of your program.  · Find a good place to walk. A treadmill or a track may be better at first. That way, you wont run the risk of going too far and finding that you cant walk back. Be sure to have a place to walk indoors in bad weather, such as a gym or a mall.  · Wear shoes with sturdy, flexible soles. The heel should fit without slipping. You should have room to wiggle your toes.  · Keep track of how long you walk. A pedometer will show your daily  progress. It can also show how much farther you can walk as time goes by.  · Ask a friend to keep you company. You may enjoy walking with someone else. Or you may want to make your walking sessions a time to relax by yourself.  · Make it fun. Listen to music while you walk and rest. Walk in a favorite park. Get to know the people at the gym, or the folks that you pass on your route. While you rest, you can window-shop, read, or feed the birds. Do whatever will help you enjoy your exercise sessions.  Date Last Reviewed: 6/1/2016  © 1098-7504 [employer]. [address] [place] [address], [place] [address]. All rights reserved. This information is not intended as a substitute for professional medical care. Always follow your healthcare professional's instructions.

## 2019-08-05 ENCOUNTER — OFFICE VISIT (OUTPATIENT)
Dept: PLASTIC SURGERY | Facility: CLINIC | Age: 59
End: 2019-08-05
Payer: COMMERCIAL

## 2019-08-05 VITALS
HEART RATE: 77 BPM | WEIGHT: 183.88 LBS | DIASTOLIC BLOOD PRESSURE: 65 MMHG | SYSTOLIC BLOOD PRESSURE: 120 MMHG | BODY MASS INDEX: 27.96 KG/M2

## 2019-08-05 DIAGNOSIS — S91.302D OPEN WOUND OF LEFT FOOT, SUBSEQUENT ENCOUNTER: Primary | ICD-10-CM

## 2019-08-05 PROCEDURE — 3008F PR BODY MASS INDEX (BMI) DOCUMENTED: ICD-10-PCS | Mod: CPTII,S$GLB,, | Performed by: SURGERY

## 2019-08-05 PROCEDURE — 99999 PR PBB SHADOW E&M-EST. PATIENT-LVL III: ICD-10-PCS | Mod: PBBFAC,,, | Performed by: SURGERY

## 2019-08-05 PROCEDURE — 3008F BODY MASS INDEX DOCD: CPT | Mod: CPTII,S$GLB,, | Performed by: SURGERY

## 2019-08-05 PROCEDURE — 3078F DIAST BP <80 MM HG: CPT | Mod: CPTII,S$GLB,, | Performed by: SURGERY

## 2019-08-05 PROCEDURE — 99999 PR PBB SHADOW E&M-EST. PATIENT-LVL III: CPT | Mod: PBBFAC,,, | Performed by: SURGERY

## 2019-08-05 PROCEDURE — 3074F SYST BP LT 130 MM HG: CPT | Mod: CPTII,S$GLB,, | Performed by: SURGERY

## 2019-08-05 PROCEDURE — 3074F PR MOST RECENT SYSTOLIC BLOOD PRESSURE < 130 MM HG: ICD-10-PCS | Mod: CPTII,S$GLB,, | Performed by: SURGERY

## 2019-08-05 PROCEDURE — 99214 PR OFFICE/OUTPT VISIT, EST, LEVL IV, 30-39 MIN: ICD-10-PCS | Mod: S$GLB,,, | Performed by: SURGERY

## 2019-08-05 PROCEDURE — 99214 OFFICE O/P EST MOD 30 MIN: CPT | Mod: S$GLB,,, | Performed by: SURGERY

## 2019-08-05 PROCEDURE — 3078F PR MOST RECENT DIASTOLIC BLOOD PRESSURE < 80 MM HG: ICD-10-PCS | Mod: CPTII,S$GLB,, | Performed by: SURGERY

## 2019-08-06 RX ORDER — DOXYCYCLINE 100 MG/1
100 CAPSULE ORAL EVERY 12 HOURS
Qty: 14 CAPSULE | Refills: 0 | Status: SHIPPED | OUTPATIENT
Start: 2019-08-06 | End: 2019-12-10 | Stop reason: ALTCHOICE

## 2019-08-07 ENCOUNTER — TELEPHONE (OUTPATIENT)
Dept: PLASTIC SURGERY | Facility: CLINIC | Age: 59
End: 2019-08-07

## 2019-08-07 NOTE — TELEPHONE ENCOUNTER
spoke with pts sister to find out a good time for her to transport pt to appt, Pt sister stated friday afternnon. I called back to give her appt time date and location, she didnt answer so I left it in a vm.

## 2019-08-07 NOTE — TELEPHONE ENCOUNTER
No answer. Left VM.    Calling to discuss OR date for patient could possibly add on for 8/15 or 8/28

## 2019-08-07 NOTE — PROGRESS NOTES
Returns for wound check after follow up with Dr. Cox  Has been getting wound vac in wound care with wound vac changes  Denies fever, new drainage or odor from wound  Has noticed new redness and swelling in his left leg    4/30 Angiogram  Procedure:  1. US guided R CFA access  2. Aortogram with CO2  3. L femoral angiogram with CO2  4. L popliteal angiogram  5. L tibial angiogram   6. L peroneal angioplasty with a [id number] [place] balloon   7. L peroneal angioplasty with a [id number] [place] balloon  8. L AT angioplasty with a 4f040cn [place] balloon  9. L AT angioplasty with a 2.5x100 [place] balloon   10. 6fr Mynx closure R CFA  11. Moderate sedation     Per Dr. Chan's note:  5/16/19: BLE with no HD significant stenosis, SIDRA 0.8/1.46, left ankle pressures 60 and 171                  Review of patient's allergies indicates:   Allergen Reactions    Statins-hmg-coa reductase inhibitors         Generalized Pain    Onglyza [saxagliptin]      Penicillins Rash      Objective:    Weight: 80.3 kg (177 lb)  Body mass index is 25.4 kg/m².     Improved granulation tissue left TMA stump  Further contraction of wounds with epithelialization of wound edges  Weight bearing surfaces other than calcaneus not involved  Some fibrinous debris over area of metatarsal heads  Interval development of left leg swelling and erythema     Physical Exam   Constitutional: He is oriented to person, place, and time. He appears well-developed and well-nourished. No distress.   HENT:   Head: Normocephalic and atraumatic.   Right Ear: Hearing and external ear normal.   Left Ear: Hearing and external ear normal.   Nose: Nose normal.   Eyes: Pupils are equal, round, and reactive to light. Conjunctivae are normal.   Cardiovascular: Normal rate and regular rhythm.   Pulmonary/Chest: Effort normal and breath sounds normal.   Normal respiratory effort.    Abdominal: Soft. He exhibits no distension.   Musculoskeletal: He exhibits edema.        Left  foot: There is decreased range of motion and deformity.   Feet:   Left Foot: amputated  Protective Sensation: 1 site tested. 1 site sensed.  Skin Integrity: Positive for skin breakdown and dry skin.   Neurological: He is alert and oriented to person, place, and time.   Skin: Skin is warm and dry. No rash noted. No pallor.   Psychiatric: He has a normal mood and affect. His speech is normal and behavior is normal.      Personally reviewed following labs  Last A1C was 6    Personally reviewed his last aerobic wound culture  Multi resistant Acinetobacter and pseudomonas, sensitivities only for IV antibiotics     Significant Diagnostics:  I have personally reviewed the results of the vascular reports listed above        Assessment/Plan:      Diagnoses:  1.  Left lower extremity atherosclerosis, left foot wound with necrosis of   underlying skin and subcutaneous tissue.  Now with decreased devitalized tissue and intervally improved granulation tissue since last visit  2.  Hypertension.  3.  Hyperlipidemia.  4.  CKD stage III.  5.  Diabetes mellitus.  6.  Liver disease, status post paracentesis.  7.  Status post left open transmetatarsal amputation.  8.  Status post angioplasty revascularization of left lower extremity      Plan:  - Ordered doxycycline to cover skin gita and MRSA.    - DVT scan to rule out DVT  - ordered new labs CBC, CMP, ESR, CRP for baseline  - Wound appears healthier other new left leg redness. Cannot say this represents any deep infection or evolving cellulitis.  Instructed them on monitoring    I discussed skin graft coverage with him.  I explained that a reasonable goal for him would be to contract the size of the wound and promote healing.  It is not without risk, including the risk of general anesthesia and his tenuous overall health.   I explained that I would expect his wound wound take a prolonged time to heal without any intervention and risk infection in the wound or leg given the large  wound surfaces.      I explained the nature of the procedure, the need to use a dermatome have a split thickness skin graft taken from his thigh.  I explained that this donor would have to heal and can take time, ooze and have scabbing.  I explained to him that if I can rule out infection, I expect the wound bed could accept a skin graft.  The recovery from this procedure would require that he offload his left foot for 4 weeks as a main cause of graft failure is shear, in particular at his heel.    Risks of the surgery include bleeding, infection, delayed healing at donor site, partial or complete loss of skin graft, need for continued wound care post op, need for future surgeries, pain in the limb, and pain in his thigh.  Alternatives include not performing a skin graft and allowing his wound to further heal by secondary intention.      Insurance verification will be submitted.  He can return to clinic in 1 week to review his preoperative work up.  In the interim I can answer any questions that he or his family have.      40 minutes was spend with patient, more than 50% was spent explaining the nature of the procedure, expected recovery, risks benefits and alternatives or answering questions.       Plastic & Reconstructive Surgery  [facility]  c/o Leonidas Diaz M.D.  [facility]  [address]  [address] [place] [address]     Work [phone]  Toll free [phone]  If no answer [phone]

## 2019-08-09 ENCOUNTER — CLINICAL SUPPORT (OUTPATIENT)
Dept: CARDIOLOGY | Facility: CLINIC | Age: 59
End: 2019-08-09
Attending: SURGERY
Payer: COMMERCIAL

## 2019-08-09 DIAGNOSIS — S91.302D OPEN WOUND OF LEFT FOOT, SUBSEQUENT ENCOUNTER: ICD-10-CM

## 2019-08-09 PROCEDURE — 93970 EXTREMITY STUDY: CPT | Mod: S$GLB,,, | Performed by: INTERNAL MEDICINE

## 2019-08-09 PROCEDURE — 93970 CV US LOWER VENOUS INSUFFICIENCY BILATERAL (CUPID ONLY): ICD-10-PCS | Mod: S$GLB,,, | Performed by: INTERNAL MEDICINE

## 2019-08-12 ENCOUNTER — TELEPHONE (OUTPATIENT)
Dept: HOME HEALTH SERVICES | Facility: HOSPITAL | Age: 59
End: 2019-08-12
Payer: COMMERCIAL

## 2019-08-12 ENCOUNTER — OFFICE VISIT (OUTPATIENT)
Dept: PLASTIC SURGERY | Facility: CLINIC | Age: 59
End: 2019-08-12
Payer: COMMERCIAL

## 2019-08-12 VITALS
DIASTOLIC BLOOD PRESSURE: 61 MMHG | WEIGHT: 190 LBS | SYSTOLIC BLOOD PRESSURE: 112 MMHG | HEART RATE: 84 BPM | BODY MASS INDEX: 28.89 KG/M2

## 2019-08-12 DIAGNOSIS — S91.302A WOUND OF LEFT FOOT: Primary | ICD-10-CM

## 2019-08-12 LAB
LEFT GREAT SAPHENOUS DISTAL THIGH DIA: 0.65 CM
LEFT GREAT SAPHENOUS JUNCTION DIA: 0.88 CM
LEFT GREAT SAPHENOUS KNEE DIA: 0.2 CM
LEFT GREAT SAPHENOUS MIDDLE THIGH DIA: 0.73 CM
LEFT GREAT SAPHENOUS PROXIMAL CALF DIA: 0.32 CM
LEFT SMALL SAPHENOUS KNEE DIA: 0.44 CM
LEFT SMALL SAPHENOUS SPJ DIA: 0.22 CM
LEFT SMALL SAPHENOUS SPJ REFLUX: 3314 MS
RIGHT GREAT SAPHENOUS DISTAL THIGH DIA: 0.43 CM
RIGHT GREAT SAPHENOUS JUNCTION DIA: 0.84 CM
RIGHT GREAT SAPHENOUS KNEE DIA: 0.47 CM
RIGHT GREAT SAPHENOUS MIDDLE THIGH DIA: 0.51 CM
RIGHT GREAT SAPHENOUS PROXIMAL CALF DIA: 0.34 CM
RIGHT GREAT SAPHENOUS PROXIMAL CALF REFLUX: 4449 MS
RIGHT SMALL SAPHENOUS KNEE DIA: 0.28 CM
RIGHT SMALL SAPHENOUS SPJ DIA: 0.25 CM

## 2019-08-12 PROCEDURE — 3078F PR MOST RECENT DIASTOLIC BLOOD PRESSURE < 80 MM HG: ICD-10-PCS | Mod: CPTII,S$GLB,, | Performed by: SURGERY

## 2019-08-12 PROCEDURE — 3074F SYST BP LT 130 MM HG: CPT | Mod: CPTII,S$GLB,, | Performed by: SURGERY

## 2019-08-12 PROCEDURE — 99214 PR OFFICE/OUTPT VISIT, EST, LEVL IV, 30-39 MIN: ICD-10-PCS | Mod: S$GLB,,, | Performed by: SURGERY

## 2019-08-12 PROCEDURE — 3078F DIAST BP <80 MM HG: CPT | Mod: CPTII,S$GLB,, | Performed by: SURGERY

## 2019-08-12 PROCEDURE — 99999 PR PBB SHADOW E&M-EST. PATIENT-LVL III: CPT | Mod: PBBFAC,,, | Performed by: SURGERY

## 2019-08-12 PROCEDURE — 3008F PR BODY MASS INDEX (BMI) DOCUMENTED: ICD-10-PCS | Mod: CPTII,S$GLB,, | Performed by: SURGERY

## 2019-08-12 PROCEDURE — 99214 OFFICE O/P EST MOD 30 MIN: CPT | Mod: S$GLB,,, | Performed by: SURGERY

## 2019-08-12 PROCEDURE — 99999 PR PBB SHADOW E&M-EST. PATIENT-LVL III: ICD-10-PCS | Mod: PBBFAC,,, | Performed by: SURGERY

## 2019-08-12 PROCEDURE — 3008F BODY MASS INDEX DOCD: CPT | Mod: CPTII,S$GLB,, | Performed by: SURGERY

## 2019-08-12 PROCEDURE — 3074F PR MOST RECENT SYSTOLIC BLOOD PRESSURE < 130 MM HG: ICD-10-PCS | Mod: CPTII,S$GLB,, | Performed by: SURGERY

## 2019-08-12 NOTE — LETTER
[facility]  [address]  [facility] [address]  Phone: [phone]  Fax: [phone] August 16, 2019      Mitch Chan MD  [address] [facility]  [address]  [facility] [address]    Patient: Marvin Ray   MR Number: [id number]   YOB: 1960   Date of Visit: 8/12/2019     Dear Dr. Chan:    Thank you for referring Marvin Ray to me for evaluation. Below are the relevant portions of my assessment and plan of care.    Mr. Ray is a 59-year-old male with left foot wounds, granulating after extensive revascularization.     Plan:  - Will proceed with OR booking   - Optimize nutrition with protein shakes, etc  - Explained to him the importance of offloading post operatively, spent a great deal of time reinforcing that this is his chance to heal these wounds and he has to offload in order to do so  - Will require anesthesia clearance or cardiology clearance.  Will not add on before September.   Should occur after he is cleared by cardiology or Dr. Echols.  - Should return for preoperative appointment and wound culture  - No further antibiotics at this time   - All questions answered     40 minutes was spend with patient, more than 50% was spent explaining the surgery, recovery, timeline or answering questions, coordinating care related to his foot wounds    If you have questions, please do not hesitate to call me. I look forward to following Marvin along with you.    Sincerely,    Leonidas Diaz MD  Section of Plastic & Reconstructive Surgery  Department of Surgery  [facility]    KMK/hcr    CC  Rubén Davis MD

## 2019-08-13 ENCOUNTER — TELEPHONE (OUTPATIENT)
Dept: PLASTIC SURGERY | Facility: CLINIC | Age: 59
End: 2019-08-13

## 2019-08-13 ENCOUNTER — HOSPITAL ENCOUNTER (OUTPATIENT)
Dept: WOUND CARE | Facility: HOSPITAL | Age: 59
Discharge: HOME | End: 2019-08-13
Attending: FAMILY MEDICINE
Payer: COMMERCIAL

## 2019-08-13 DIAGNOSIS — E11.621 TYPE 2 DIABETES MELLITUS WITH LEFT DIABETIC FOOT ULCER: Primary | ICD-10-CM

## 2019-08-13 DIAGNOSIS — L97.529 TYPE 2 DIABETES MELLITUS WITH LEFT DIABETIC FOOT ULCER: Primary | ICD-10-CM

## 2019-08-13 DIAGNOSIS — I70.244 ATHEROSCLEROSIS OF NATIVE ARTERY OF LEFT LOWER EXTREMITY WITH ULCERATION OF MIDFOOT: ICD-10-CM

## 2019-08-13 PROCEDURE — 99214 OFFICE O/P EST MOD 30 MIN: CPT | Mod: ,,, | Performed by: FAMILY MEDICINE

## 2019-08-13 PROCEDURE — 99214 PR OFFICE/OUTPT VISIT, EST, LEVL IV, 30-39 MIN: ICD-10-PCS | Mod: ,,, | Performed by: FAMILY MEDICINE

## 2019-08-13 PROCEDURE — 97606 NEG PRS WND THER DME>50 SQCM: CPT | Performed by: FAMILY MEDICINE

## 2019-08-13 NOTE — PROGRESS NOTES
Plastic Update    Subjective:  No acute events  Completed ultrasound and laboratory work    Objective:  Respiration unlabored, chest rise symmetric  No evidence of infection in feet  Granulating foot wounds  7-22 A1C 6.0  Improved induration of soft tissues of left leg after antibiotics    Assessment:  59 year old male with left foot wounds, granulating after extensive revascularization    Plan:  - Will proceed with OR booking.   - Optimize nutrition with protein shakes, etc  - Will   - Explained to him the importance of offloading post operatively, spent a great deal of time reinforcing that this is his chance to heal these wounds and he has to offload in order to do so  - Will require anesthesia clearance or cardiology clearance.  Will not add on before September.   Should occur after he is cleared by cardiology or Dr. Echols  - Should return for preoperative appointment and wound culture.    - No further antibiotics at this time.    - All questions answered    40 minutes was spend with patient, more than 50% was spent explaining the surgery, recovery, timeline or answering questions, coordinating care related to his foot wounds    Plastic & Reconstructive Surgery  [facility]  c/o Leonidas Diaz M.D.  [facility]  [address]  [address] [place] [address]    Work [phone]  Toll free [phone]  If no answer [phone]

## 2019-08-13 NOTE — TELEPHONE ENCOUNTER
Called patient to discuss OR dates. He requested I reach out to his sister to determine date.    No answer patient's sister's line. Left VM.    If patient's sister calls back please inform her the following dates are available    9/4  9/6  9/18  10/2

## 2019-08-14 ENCOUNTER — TELEPHONE (OUTPATIENT)
Dept: PLASTIC SURGERY | Facility: CLINIC | Age: 59
End: 2019-08-14

## 2019-08-15 ENCOUNTER — TELEPHONE (OUTPATIENT)
Dept: HOME HEALTH SERVICES | Facility: HOSPITAL | Age: 59
End: 2019-08-15

## 2019-08-16 ENCOUNTER — HOSPITAL ENCOUNTER (OUTPATIENT)
Facility: HOSPITAL | Age: 59
Discharge: HOME | End: 2019-08-16
Attending: RADIOLOGY | Admitting: RADIOLOGY
Payer: COMMERCIAL

## 2019-08-16 DIAGNOSIS — R18.8 OTHER ASCITES: ICD-10-CM

## 2019-08-16 NOTE — DISCHARGE SUMMARY
Radiology Post-Procedure Note    Pre Op Diagnosis: Ascites  Post Op Diagnosis: Same    Procedure: Paracentesis    Procedure performed by: Jenna Mackenzie MD    Written Informed Consent Obtained: Yes  Specimen Removed: YES ascites was removed and sent for testing as requested.  Estimated Blood Loss: Minimal    Findings:   Successful paracentesis.  Albumin administered PRN per protocol.    Patient tolerated procedure well.    Jenna Mackenzie MD  Staff Radiologist  Department of Radiology  Pager: [phone]

## 2019-08-16 NOTE — H&P
Radiology Consult    Marvin Ray is a 59 y.o. male with a history of ascites.  Past Medical History:   Diagnosis Date    Arthritis     Cellulitis     CKD (chronic kidney disease), stage III     Coronary artery disease     Diabetes mellitus     Diabetic retinopathy     Diabetic ulcer of left foot     Glaucoma     Gout     Hyperlipemia     Hypertension     ICD (implantable cardioverter-defibrillator) in place 11/02/2018    Left chest    Non-pressure chronic ulcer of other part of left foot with fat layer exposed 10/23/2018    PVD (peripheral vascular disease)     Type 2 diabetes mellitus with left diabetic foot ulcer 10/29/2018    Unsteady gait     uses a wheelchair     Past Surgical History:   Procedure Laterality Date    AHMED GLAUCOMA IMPLANT Left 2011    DONE AT [facility]    AMPUTATION, TOE Left 12/5/2018    Performed by Mitch Chan MD at [facility] OR    AMPUTATION, TOES  2-5 Left 11/16/2018    Performed by [facility] at [facility]    AORTOGRAM, WITH SERIALOGRAPHY, LEFT LOWER EXTREMITY, POSSIBLE INTERVENTION Left 4/30/2019    Performed by [facility] at [facility] OR    BAERVELDT GLAUCOMA IMPLANT Left 2012    WITH CATARACT EXTRACTION//DONE AT [facility]    CARDIAC CATHETERIZATION Left 05/2016    CARDIAC DEFIBRILLATOR PLACEMENT Left 11/02/2018    CATARACT EXTRACTION W/  INTRAOCULAR LENS IMPLANT Left 2012    WITH BAERVEDT//DONE AT [facility]    CATARACT EXTRACTION W/  INTRAOCULAR LENS IMPLANT Right 09/26/2018    COMPLEX ()    CB DESTRUCTION WITH CYCLO G6 Left 02/15/2017        CYST REMOVAL      DEBRIDEMENT, FOOT  6/5/2019    Performed by [facility] at [facility] OR    DEBRIDEMENT, FOOT  12/28/2018    Performed by [facility] at [facility] OR    Exam under anesthesia, left foot debridement, left foot washout, possible left second through fifth metatarsal resection Left 12/28/2018    Performed by [facility] at [facility] OR     Exam under anesthesia, left foot debridement, washout and all other indicated procedures Left 12/5/2018    Performed by [facility] at [facility] OR    EXCISION, LESION, METATARSAL BONE  12/28/2018    Performed by [facility] Chan [facility] at [facility] OR    HEART CATH-LEFT N/A 5/6/2016    Performed by Mike Magana MD at [facility] CATH LAB    INCISION AND DRAINAGE Left 11/16/2018    Performed by Mitch [facility] at [facility] OR    Incision and Drainage, left lower extremity debridement, washout Left 11/14/2018    Performed by Mitch Chan [facility] at [facility] OR    INSERTION, ICD GENERATOR, SINGLE CHAMBER N/A 11/2/2018    Performed by Pernell Greer MD at [facility] CATH LAB    INSERTION, IOL PROSTHESIS Right 9/26/2018    Performed by Perla Cortés MD at [facility] OR 1ST FLR    LEFT FOOT DEBRIDEMENT, WASHOUT AND ALL OTHER INDICATED PROCEDURES Left 6/5/2019    Performed by [facility] at [facility] OR    LEFT FOOT WOUND DEBRIDEMENT, WASHOUT AND ALL OTHER INDICATED PROCEDURES Left 2/13/2019    Performed by [facility] Chan [facility] at [facility] OR    PARACENTESIS, ABDOMINAL, INITIAL N/A 7/22/2019    Performed by [facility] Diagnostic Provider at [facility] OR    PARACENTESIS, ABDOMINAL, INITIAL N/A 3/25/2019    Performed by [facility] at [facility] OR    PARACENTESIS, ABDOMINAL, INITIAL N/A 3/1/2019    Performed by [facility] at [facility]    PARACENTESIS, ABDOMINAL, REPEAT N/A 2/11/2019    Performed by [facility] at [facility] OR    PHACOEMULSIFICATION, CATARACT Right 9/26/2018    Performed by Perla [facility] MD at [facility] OR 1ST FLR    Right foot surgery  10/2014    TOE AMPUTATION Right     first and second    TONSILLECTOMY      TRABECULECTOMY/G 6 LASER Left 2/15/2017    Performed by [facility]la [facility] at [facility] OR 1ST FLR    WASHOUT  6/5/2019    Performed by Mitch Chan MD at [facility] OR           Imaging reviewed with Radiology staff, Dr. Mackenzie.     Procedure:  Paracentesis    Scheduled Meds:   Continuous Infusions:   PRN Meds:    Allergies:   Review of patient's allergies indicates:   Allergen Reactions    Statins-hmg-coa reductase inhibitors      Generalized Pain    Onglyza [saxagliptin]     Penicillins Rash       Labs:  No results for input(s): INR in the last 168 hours.    Invalid input(s):  PT,  PTT  No results for input(s): WBC, HGB, HCT, MCV, PLT in the last 168 hours. No results for input(s): GLU, NA, K, CL, CO2, BUN, CREATININE, CALCIUM, MG, ALT, AST, ALBUMIN, BILITOT, BILIDIR in the last 168 hours.  ASA 2  Mallampati 3    Vitals (Most Recent):       Plan:   1. .  2. Hold anticoagulants.  3.  scheduled for Paracentesis.    Jenna Mackenzie MD  Staff Radiologist  Department of Radiology  Pager: [phone]

## 2019-08-16 NOTE — DISCHARGE SUMMARY
Radiology Post-Procedure Note    Pre Op Diagnosis: Ascites  Post Op Diagnosis: Same    Procedure: Paracentesis    Procedure performed by: Jenna Mackenzie MD    Written Informed Consent Obtained: Yes  Specimen Removed: NO  Estimated Blood Loss: Minimal    Findings:   Successful paracentesis.  Albumin administered PRN per protocol.    Patient tolerated procedure well.    Jenna Mackenzie MD  Staff Radiologist  Department of Radiology  Pager: [phone]

## 2019-08-17 RX ORDER — TORSEMIDE 20 MG/1
TABLET ORAL
Qty: 120 TABLET | Refills: 0 | Status: SHIPPED | OUTPATIENT
Start: 2019-08-17 | End: 2019-09-03 | Stop reason: SDUPTHER

## 2019-08-20 ENCOUNTER — HOSPITAL ENCOUNTER (OUTPATIENT)
Dept: WOUND CARE | Facility: HOSPITAL | Age: 59
Discharge: HOME | End: 2019-08-20
Attending: FAMILY MEDICINE
Payer: COMMERCIAL

## 2019-08-20 DIAGNOSIS — I70.244 ATHEROSCLEROSIS OF NATIVE ARTERY OF LEFT LOWER EXTREMITY WITH ULCERATION OF MIDFOOT: Primary | ICD-10-CM

## 2019-08-20 DIAGNOSIS — L97.529 TYPE 2 DIABETES MELLITUS WITH LEFT DIABETIC FOOT ULCER: ICD-10-CM

## 2019-08-20 DIAGNOSIS — E11.621 TYPE 2 DIABETES MELLITUS WITH LEFT DIABETIC FOOT ULCER: ICD-10-CM

## 2019-08-20 PROCEDURE — G0179 MD RECERTIFICATION HHA PT: HCPCS | Mod: ,,, | Performed by: SURGERY

## 2019-08-20 PROCEDURE — 99214 PR OFFICE/OUTPT VISIT, EST, LEVL IV, 30-39 MIN: ICD-10-PCS | Mod: ,,, | Performed by: FAMILY MEDICINE

## 2019-08-20 PROCEDURE — 97606 NEG PRS WND THER DME>50 SQCM: CPT | Performed by: FAMILY MEDICINE

## 2019-08-20 PROCEDURE — 99214 OFFICE O/P EST MOD 30 MIN: CPT | Mod: ,,, | Performed by: FAMILY MEDICINE

## 2019-08-20 PROCEDURE — G0179 PR HOME HEALTH MD RECERTIFICATION: ICD-10-PCS | Mod: ,,, | Performed by: SURGERY

## 2019-08-21 ENCOUNTER — TELEPHONE (OUTPATIENT)
Dept: HOME HEALTH SERVICES | Facility: HOSPITAL | Age: 59
End: 2019-08-21

## 2019-08-22 ENCOUNTER — LAB VISIT (OUTPATIENT)
Dept: LAB | Facility: HOSPITAL | Age: 59
End: 2019-08-22
Attending: SURGERY
Payer: COMMERCIAL

## 2019-08-22 DIAGNOSIS — S91.302D OPEN WOUND OF LEFT FOOT, SUBSEQUENT ENCOUNTER: ICD-10-CM

## 2019-08-22 LAB
ALBUMIN SERPL BCP-MCNC: 2.8 G/DL (ref 3.5–5.2)
ALP SERPL-CCNC: 135 U/L (ref 55–135)
ALT SERPL W/O P-5'-P-CCNC: 12 U/L (ref 10–44)
ANION GAP SERPL CALC-SCNC: 8 MMOL/L (ref 8–16)
AST SERPL-CCNC: 14 U/L (ref 10–40)
BASOPHILS # BLD AUTO: 0.05 K/UL (ref 0–0.2)
BASOPHILS NFR BLD: 1 % (ref 0–1.9)
BILIRUB SERPL-MCNC: 0.5 MG/DL (ref 0.1–1)
BUN SERPL-MCNC: 39 MG/DL (ref 8–23)
CALCIUM SERPL-MCNC: 8.8 MG/DL (ref 8.7–10.5)
CHLORIDE SERPL-SCNC: 104 MMOL/L (ref 95–110)
CO2 SERPL-SCNC: 28 MMOL/L (ref 23–29)
CREAT SERPL-MCNC: 1.1 MG/DL (ref 0.5–1.4)
CRP SERPL-MCNC: 28.3 MG/L (ref 0–8.2)
DIFFERENTIAL METHOD: ABNORMAL
EOSINOPHIL # BLD AUTO: 0.3 K/UL (ref 0–0.5)
EOSINOPHIL NFR BLD: 6 % (ref 0–8)
ERYTHROCYTE [DISTWIDTH] IN BLOOD BY AUTOMATED COUNT: 19.1 % (ref 11.5–14.5)
ERYTHROCYTE [SEDIMENTATION RATE] IN BLOOD BY WESTERGREN METHOD: 35 MM/HR (ref 0–23)
EST. GFR  (AFRICAN AMERICAN): >60 ML/MIN/1.73 M^2
EST. GFR  (NON AFRICAN AMERICAN): >60 ML/MIN/1.73 M^2
GLUCOSE SERPL-MCNC: 83 MG/DL (ref 70–110)
HCT VFR BLD AUTO: 32 % (ref 40–54)
HGB BLD-MCNC: 9.9 G/DL (ref 14–18)
LYMPHOCYTES # BLD AUTO: 0.5 K/UL (ref 1–4.8)
LYMPHOCYTES NFR BLD: 10.1 % (ref 18–48)
MCH RBC QN AUTO: 26.5 PG (ref 27–31)
MCHC RBC AUTO-ENTMCNC: 30.9 G/DL (ref 32–36)
MCV RBC AUTO: 86 FL (ref 82–98)
MONOCYTES # BLD AUTO: 0.7 K/UL (ref 0.3–1)
MONOCYTES NFR BLD: 13.1 % (ref 4–15)
NEUTROPHILS # BLD AUTO: 3.6 K/UL (ref 1.8–7.7)
NEUTROPHILS NFR BLD: 69.8 % (ref 38–73)
PLATELET # BLD AUTO: 319 K/UL (ref 150–350)
PMV BLD AUTO: 8.7 FL (ref 9.2–12.9)
POTASSIUM SERPL-SCNC: 3.6 MMOL/L (ref 3.5–5.1)
PROT SERPL-MCNC: 6.7 G/DL (ref 6–8.4)
RBC # BLD AUTO: 3.73 M/UL (ref 4.6–6.2)
SODIUM SERPL-SCNC: 140 MMOL/L (ref 136–145)
WBC # BLD AUTO: 5.13 K/UL (ref 3.9–12.7)

## 2019-08-22 PROCEDURE — 85025 COMPLETE CBC W/AUTO DIFF WBC: CPT

## 2019-08-22 PROCEDURE — 80053 COMPREHEN METABOLIC PANEL: CPT

## 2019-08-22 PROCEDURE — 36415 COLL VENOUS BLD VENIPUNCTURE: CPT

## 2019-08-22 PROCEDURE — 86140 C-REACTIVE PROTEIN: CPT

## 2019-08-22 PROCEDURE — 85652 RBC SED RATE AUTOMATED: CPT

## 2019-08-27 ENCOUNTER — HOSPITAL ENCOUNTER (OUTPATIENT)
Dept: WOUND CARE | Facility: HOSPITAL | Age: 59
Discharge: HOME | End: 2019-08-27
Attending: FAMILY MEDICINE
Payer: COMMERCIAL

## 2019-08-27 DIAGNOSIS — E08.621 DIABETES MELLITUS DUE TO UNDERLYING CONDITION WITH FOOT ULCER, WITHOUT LONG-TERM CURRENT USE OF INSULIN: ICD-10-CM

## 2019-08-27 DIAGNOSIS — S91.302D OPEN WOUND OF FOOT, LEFT, SUBSEQUENT ENCOUNTER: Primary | ICD-10-CM

## 2019-08-27 DIAGNOSIS — L97.522 ULCER OF LEFT FOOT, WITH FAT LAYER EXPOSED: ICD-10-CM

## 2019-08-27 DIAGNOSIS — L97.509 DIABETES MELLITUS DUE TO UNDERLYING CONDITION WITH FOOT ULCER, WITHOUT LONG-TERM CURRENT USE OF INSULIN: ICD-10-CM

## 2019-08-27 PROCEDURE — 99214 OFFICE O/P EST MOD 30 MIN: CPT | Mod: ,,, | Performed by: FAMILY MEDICINE

## 2019-08-27 PROCEDURE — 99214 PR OFFICE/OUTPT VISIT, EST, LEVL IV, 30-39 MIN: ICD-10-PCS | Mod: ,,, | Performed by: FAMILY MEDICINE

## 2019-08-27 PROCEDURE — 97605 NEG PRS WND THER DME<=50SQCM: CPT

## 2019-08-29 ENCOUNTER — EXTERNAL HOME HEALTH (OUTPATIENT)
Dept: HOME HEALTH SERVICES | Facility: HOSPITAL | Age: 59
End: 2019-08-29
Payer: COMMERCIAL

## 2019-08-29 ENCOUNTER — TELEPHONE (OUTPATIENT)
Dept: HOME HEALTH SERVICES | Facility: HOSPITAL | Age: 59
End: 2019-08-29

## 2019-08-29 NOTE — NURSING
Path Notes (To The Dermatopathologist): . Patient off unit to IR via bed.Cayla    Destruction After The Procedure: No Additional Anesthesia Volume In Cc (Will Not Render If 0): 0 Billing Type: Third-Party Bill Electrodesiccation And Curettage Text: The wound bed was treated with electrodesiccation and curettage after the biopsy was performed. Anesthesia Volume In Cc: 0.5 Wound Care: Vaseline Detail Level: Detailed Biopsy Type: H and E Post-Care Instructions: I reviewed with the patient in detail post-care instructions. Patient is to keep the biopsy site dry overnight, and then apply bacitracin twice daily until healed. Patient may apply hydrogen peroxide soaks to remove any crusting. Was A Bandage Applied: Yes Depth Of Biopsy: dermis Consent: Written consent was obtained and risks were reviewed including but not limited to scarring, infection, bleeding, scabbing, incomplete removal, nerve damage and allergy to anesthesia. Accession #: [id number] Anesthesia Type: 1% lidocaine with epinephrine Dressing: bandage Biopsy Method: Dermablade Notification Instructions: Patient will be notified of biopsy results. However, patient instructed to call the office if not contacted within 2 weeks. Type Of Destruction Used: Curettage Electrodesiccation Text: The wound bed was treated with electrodesiccation after the biopsy was performed. Silver Nitrate Text: The wound bed was treated with silver nitrate after the biopsy was performed. Cryotherapy Text: The wound bed was treated with cryotherapy after the biopsy was performed. Curettage Text: The wound bed was treated with curettage after the biopsy was performed. Hemostasis: Aluminum Chloride

## 2019-09-03 RX ORDER — TORSEMIDE 20 MG/1
TABLET ORAL
Qty: 120 TABLET | Refills: 0 | Status: SHIPPED | OUTPATIENT
Start: 2019-09-03 | End: 2019-09-22 | Stop reason: SDUPTHER

## 2019-09-10 ENCOUNTER — HOSPITAL ENCOUNTER (OUTPATIENT)
Dept: WOUND CARE | Facility: HOSPITAL | Age: 59
Discharge: HOME | End: 2019-09-10
Attending: FAMILY MEDICINE
Payer: COMMERCIAL

## 2019-09-10 DIAGNOSIS — I70.244 ATHEROSCLEROSIS OF NATIVE ARTERY OF LEFT LOWER EXTREMITY WITH ULCERATION OF MIDFOOT: Primary | ICD-10-CM

## 2019-09-10 PROCEDURE — 99214 OFFICE O/P EST MOD 30 MIN: CPT | Mod: ,,, | Performed by: FAMILY MEDICINE

## 2019-09-10 PROCEDURE — 99215 OFFICE O/P EST HI 40 MIN: CPT | Performed by: FAMILY MEDICINE

## 2019-09-10 PROCEDURE — 99214 PR OFFICE/OUTPT VISIT, EST, LEVL IV, 30-39 MIN: ICD-10-PCS | Mod: ,,, | Performed by: FAMILY MEDICINE

## 2019-09-11 ENCOUNTER — TELEPHONE (OUTPATIENT)
Dept: HOME HEALTH SERVICES | Facility: HOSPITAL | Age: 59
End: 2019-09-11

## 2019-09-12 ENCOUNTER — HOSPITAL ENCOUNTER (OUTPATIENT)
Facility: HOSPITAL | Age: 59
Discharge: HOME | End: 2019-09-12
Attending: RADIOLOGY | Admitting: RADIOLOGY
Payer: COMMERCIAL

## 2019-09-12 VITALS
HEART RATE: 75 BPM | DIASTOLIC BLOOD PRESSURE: 64 MMHG | RESPIRATION RATE: 18 BRPM | OXYGEN SATURATION: 98 % | SYSTOLIC BLOOD PRESSURE: 117 MMHG

## 2019-09-12 DIAGNOSIS — R18.8 OTHER ASCITES: ICD-10-CM

## 2019-09-12 PROCEDURE — 63600175 PHARM REV CODE 636 W HCPCS: Mod: JG | Performed by: RADIOLOGY

## 2019-09-12 PROCEDURE — P9047 ALBUMIN (HUMAN), 25%, 50ML: HCPCS | Mod: JG | Performed by: RADIOLOGY

## 2019-09-12 RX ORDER — ALBUMIN HUMAN 250 G/1000ML
50 SOLUTION INTRAVENOUS ONCE AS NEEDED
Status: COMPLETED | OUTPATIENT
Start: 2019-09-12 | End: 2019-09-12

## 2019-09-12 RX ADMIN — ALBUMIN (HUMAN) 50 G: 25 SOLUTION INTRAVENOUS at 11:09

## 2019-09-12 NOTE — DISCHARGE INSTRUCTIONS
BATHING:  ? You may shower tomorrow.  DRESSING:  ? Remove dressing tomorrow.        ACTIVITY LEVEL: If you have received sedation or an anesthetic, you may feel sleepy for several hours. Rest until you are more awake. Gradually resume your normal activities    Do not drive, drink alcohol, or sign legal documents for 24 hours, or if taking narcotic pain medication.      DIET: You may resume your home diet. If nausea is present, increase your diet gradually with fluids and bland foods.    Medications: Pain medication should be taken only if needed and as directed. If antibiotics are prescribed, the medication should be taken until completed. You will be given an updated list of you medications.  ? No driving, alcoholic beverages or signing legal documents for next 24 hours if you have had sedation, or while taking pain medication    CALL THE DOCTOR:   For any obvious bleeding (some dried blood over the incision is normal).     Redness, swelling, foul smell around incision or fever over 101.  Shortness of breath.  Persistent pain or nausea not relieved by medication.  Call  [phone]     to speak with an Interventional Radiologist    If any unusual problems or difficulties occur contact your doctor. If you cannot contact your doctor but feel your signs and symptoms warrant a physicians attention return to the emergency room.     Fall Prevention  Millions of people fall every year and injure themselves. You may have had anesthesia or sedation which may increase your risk of falling. You may have health issues that put you at an increased risk of falling.     Here are ways to reduce your risk of falling.  ·   · Make your home safe by keeping walkways clear of objects you may trip over.  · Use non-slip pads under rugs. Do not use area rugs or small throw rugs.  · Use non-slip mats in bathtubs and showers.  · Install handrails and lights on staircases.  · Do not walk in poorly lit areas.  · Do not stand on chairs or wobbly  ladders.  · Use caution when reaching overhead or looking upward. This position can cause a loss of balance.  · Be sure your shoes fit properly, have non-slip bottoms and are in good condition.   · Wear shoes both inside and out. Avoid going barefoot or wearing slippers.  · Be cautious when going up and down stairs, curbs, and when walking on uneven sidewalks.  · If your balance is poor, consider using a cane or walker.  · If your fall was related to alcohol use, stop or limit alcohol intake.   · If your fall was related to use of sleeping medicines, talk to your doctor about this. You may need to reduce your dosage at bedtime if you awaken during the night to go to the bathroom.    · To reduce the need for nighttime bathroom trips:  ¨ Avoid drinking fluids for several hours before going to bed  ¨ Empty your bladder before going to bed  ¨ Men can keep a urinal at the bedside  · Stay as active as you can. Balance, flexibility, strength, and endurance all come from exercise. They all play a role in preventing falls. Ask your healthcare provider which types of activity are right for you.  · Get your vision checked on a regular basis.  · If you have pets, know where they are before you stand up or walk so you don't trip over them.  Use night lights.

## 2019-09-12 NOTE — PROCEDURES
Radiology Post-Procedure Note     Pre Op Diagnosis: Recurrent painful, tense ascites  Post Op Diagnosis: Same     Procedure: U/S-guided therapeutic LVP     Procedure performed by: Zach Cha MD     Written Informed Consent Obtained: Yes  Specimen Removed: YES, 8.5-L of serous ascitic fluid  Estimated Blood Loss: none     Findings:   1. Successful therapeutic large-volume paracentesis with local anesthetic only and albumin infusion post per protocol. Patient tolerated the procedure well. No immediate post-procedural complications noted.

## 2019-09-12 NOTE — H&P
Radiology History & Physical      SUBJECTIVE:     Chief Complaint: Recurrent painful, tense ascites     History of Present Illness:  Marvin Ray is a 58 y.o. male with PMHx of CKD III and combined systolic/diastolic CHF complicated by recurrent abdominal distension and pain 2/2 recurrent painful, tense ascites requiring frequent large-volume therapeutic paracentesis.      Pt returns as outpatient with recurrence of abdominal distension and pain with +fluid wave on PE indicating recurrent ascites with outpatient request for image-guided therapeutic paracentesis.       Past Medical History:   Diagnosis Date    Arthritis     Cellulitis     CKD (chronic kidney disease), stage III     Coronary artery disease     Diabetes mellitus     Diabetic retinopathy     Diabetic ulcer of left foot     Glaucoma     Gout     Hyperlipemia     Hypertension     ICD (implantable cardioverter-defibrillator) in place 11/02/2018    Left chest    Non-pressure chronic ulcer of other part of left foot with fat layer exposed 10/23/2018    PVD (peripheral vascular disease)     Type 2 diabetes mellitus with left diabetic foot ulcer 10/29/2018    Unsteady gait     uses a wheelchair     Past Surgical History:   Procedure Laterality Date    AHMED GLAUCOMA IMPLANT Left 2011    DONE AT [facility]    AMPUTATION, TOE Left 12/5/2018    Performed by [facility] at [facility]    AMPUTATION, TOES  2-5 Left 11/16/2018    Performed by Mitch Chan MD at [facility]    AORTOGRAM, WITH SERIALOGRAPHY, LEFT LOWER EXTREMITY, POSSIBLE INTERVENTION Left 4/30/2019    Performed by Mitch Chan MD at [facility]    BAERVELDT GLAUCOMA IMPLANT Left 2012    WITH CATARACT EXTRACTION//DONE AT [facility]    CARDIAC CATHETERIZATION Left 05/2016    CARDIAC DEFIBRILLATOR PLACEMENT Left 11/02/2018    CATARACT EXTRACTION W/  INTRAOCULAR LENS IMPLANT Left 2012    WITH BAERVEDT//DONE AT [facility]    CATARACT EXTRACTION W/  INTRAOCULAR LENS  IMPLANT Right 09/26/2018    COMPLEX ()    CB DESTRUCTION WITH CYCLO G6 Left 02/15/2017        CYST REMOVAL      DEBRIDEMENT, FOOT  6/5/2019    Performed by [facility] at [facility] OR    DEBRIDEMENT, FOOT  12/28/2018    Performed by Mitch Chan MD at [facility] OR    Exam under anesthesia, left foot debridement, left foot washout, possible left second through fifth metatarsal resection Left 12/28/2018    Performed by Mitch Chan MD at [facility] OR    Exam under anesthesia, left foot debridement, washout and all other indicated procedures Left 12/5/2018    Performed by Mitch Chan MD at [facility] OR    EXCISION, LESION, METATARSAL BONE  12/28/2018    Performed by Mitch Chan MD at [facility] OR    HEART CATH-LEFT N/A 5/6/2016    Performed by [facility] at [facility] CATH LAB    INCISION AND DRAINAGE Left 11/16/2018    Performed by Mitch Chan MD at [facility] OR    Incision and Drainage, left lower extremity debridement, washout Left 11/14/2018    Performed by Mitch Chan MD at [facility] OR    INSERTION, ICD GENERATOR, SINGLE CHAMBER N/A 11/2/2018    Performed by Pernell Greer MD at [facility] CATH LAB    INSERTION, IOL PROSTHESIS Right 9/26/2018    Performed by Perla Cortés MD at [facility] OR 1ST FLR    LEFT FOOT DEBRIDEMENT, WASHOUT AND ALL OTHER INDICATED PROCEDURES Left 6/5/2019    Performed by Mitch Chan MD at [facility] OR    LEFT FOOT WOUND DEBRIDEMENT, WASHOUT AND ALL OTHER INDICATED PROCEDURES Left 2/13/2019    Performed by Mitch Chan MD at [facility] OR    PARACENTESIS, ABDOMINAL, INITIAL N/A 8/16/2019    Performed by [facility] Diagnostic Provider at [facility] OR    PARACENTESIS, ABDOMINAL, INITIAL N/A 7/22/2019    Performed by [facility] Diagnostic Provider at [facility] OR    PARACENTESIS, ABDOMINAL, INITIAL N/A 3/25/2019    Performed by [facility] Diagnostic Provider at [facility] OR    PARACENTESIS, ABDOMINAL, INITIAL N/A 3/1/2019    Performed by [facility]  Diagnostic Provider at [facility] OR    PARACENTESIS, ABDOMINAL, REPEAT N/A 2/11/2019    Performed by [facility] Diagnostic Provider at [facility] OR    PHACOEMULSIFICATION, CATARACT Right 9/26/2018    Performed by [facility]la Cort[facility] MD at [facility] OR 1ST FLR    Right foot surgery  10/2014    TOE AMPUTATION Right     first and second    TONSILLECTOMY      TRABECULECTOMY/G 6 LASER Left 2/15/2017    Performed by [facility] at [facility] OR 1ST FLR    WASHOUT  6/5/2019    Performed by Mitch Chan MD at [facility] OR       Home Meds:   Prior to Admission medications    Medication Sig Start Date End Date Taking? Authorizing Provider   allopurinol (ZYLOPRIM) 300 MG tablet Take 1 tablet (300 mg total) by mouth once daily. 6/27/19   Rubén Davis MD   aspirin 325 MG tablet Take 325 mg by mouth once daily.     Historical Provider, MD   bisacodyl (DULCOLAX) 5 mg EC tablet Take 5 mg by mouth daily as needed for Constipation.    Historical Provider, MD   brimonidine 0.2% (ALPHAGAN) 0.2 % Drop Place 1 drop into both eyes 2 (two) times daily. 2/8/19   Perla Cortés MD   carvedilol (COREG) 3.125 MG tablet Take 1 tablet (3.125 mg total) by mouth 2 (two) times daily. 1/23/19 1/23/20  Sara Ching MD   coenzyme Q10 100 mg capsule Take 100 mg by mouth every morning.    Historical Provider, MD   dorzolamide-timolol 2-0.5% (COSOPT) 22.3-6.8 mg/mL ophthalmic solution Place 1 drop into both eyes 2 (two) times daily. 2/8/19 2/8/20  Perla Cortés MD   doxycycline (VIBRAMYCIN) 100 MG Cap Take 1 capsule (100 mg total) by mouth every 12 (twelve) hours. 8/6/19   Leonidas Diaz MD   ezetimibe (ZETIA) 10 mg tablet TAKE 1 TABLET(10 MG) BY MOUTH EVERY DAY 7/26/19   Rubén Davis MD   fish oil-omega-3 fatty acids 300-1,000 mg capsule Take 1 capsule by mouth 2 (two) times daily. 1/23/19   Sara Ching MD   insulin aspart U-100 (NOVOLOG) 100 unit/mL InPn pen Use on premeal readings: 150-200=+2, 201-250=+4; 251-300=+6;  "301-350=+8, over 350=+10 units 1/31/19   Sheryl Madison NP   insulin degludec-liraglutide (XULTOPHY 100/3.6) 100 unit-3.6 mg /mL (3 mL) InPn Inject 42 Units into the skin once daily. 2/6/19   Sheryl Madison NP   MULTIVIT,THER IRON,CA,FA & MIN (MULTIVITAMIN) Tab Take 1 tablet by mouth every morning.     Historical Provider, MD   pen needle, diabetic 31 gauge x 1/4" Ndle Use as directed 8/11/16   Mike Bass MD   torsemide (DEMADEX) 20 MG Tab TAKE 4 TABLETS BY MOUTH TWICE DAILY 9/3/19   Rubén Davis MD     Anticoagulants/Antiplatelets: no anticoagulation    Allergies:   Review of patient's allergies indicates:   Allergen Reactions    Statins-hmg-coa reductase inhibitors      Generalized Pain    Onglyza [saxagliptin]     Penicillins Rash     Sedation History:  no adverse reactions    Review of Systems:   Hematological: no known coagulopathies  Respiratory: no cough, shortness of breath, or wheezing  Cardiovascular: no chest pain or dyspnea on exertion  Gastrointestinal: no abdominal pain, change in bowel habits, or black or bloody stools  Genito-Urinary: no dysuria, trouble voiding, or hematuria  Musculoskeletal: negative  Neurological: no TIA or stroke symptoms         OBJECTIVE:     Vital Signs (Most Recent)       Physical Exam:  ASA: III  Mallampati: III     General: no acute distress  Mental Status: alert and oriented to person, place and time  HEENT: normocephalic, atraumatic  Chest: mild labored breathing  Heart: regular heart rate  Abdomen: nontender. +distended  Extremity: moves all extremities      Laboratory  Lab Results   Component Value Date    INR 1.1 05/31/2019       Lab Results   Component Value Date    WBC 5.13 08/22/2019    HGB 9.9 (L) 08/22/2019    HCT 32.0 (L) 08/22/2019    MCV 86 08/22/2019     08/22/2019      Lab Results   Component Value Date    GLU 83 08/22/2019     08/22/2019    K 3.6 08/22/2019     08/22/2019    CO2 28 08/22/2019    BUN 39 (H) 08/22/2019    " CREATININE 1.1 08/22/2019    CALCIUM 8.8 08/22/2019    MG 2.1 05/31/2019    ALT 12 08/22/2019    AST 14 08/22/2019    ALBUMIN 2.8 (L) 08/22/2019    BILITOT 0.5 08/22/2019       ASSESSMENT/PLAN:   59 y/o M with CKD III and combined systolic/diastolic CHF complicated by recurrent abdominal distension and pain 2/2 recurrent painful, tense ascites requiring frequent large-volume therapeutic paracentesis.      1. Will attempt U/S-guided therapeutic paracentesis with local anesthetic only and albumin infusion post PRN per protocol.      Risks (including, but not limited to, pain, bleeding, infection, damage to nearby structures, failure to obtain sufficient material for a diagnosis, the need for additional procedures, and death), benefits, and alternatives were discussed with the patient. All questions were answered to the best of my abilities. The patient wishes to proceed with the procedure. Written informed consent was obtained.     Thank you for considering IR for the care of your patient.      Zach Cha MD  Interventional Radiology

## 2019-09-17 ENCOUNTER — OFFICE VISIT (OUTPATIENT)
Dept: OPHTHALMOLOGY | Facility: CLINIC | Age: 59
End: 2019-09-17
Payer: COMMERCIAL

## 2019-09-17 ENCOUNTER — HOSPITAL ENCOUNTER (OUTPATIENT)
Dept: WOUND CARE | Facility: HOSPITAL | Age: 59
Discharge: HOME | End: 2019-09-17
Attending: FAMILY MEDICINE
Payer: COMMERCIAL

## 2019-09-17 ENCOUNTER — PATIENT OUTREACH (OUTPATIENT)
Dept: ADMINISTRATIVE | Facility: OTHER | Age: 59
End: 2019-09-17

## 2019-09-17 DIAGNOSIS — Z96.1 PSEUDOPHAKIA: ICD-10-CM

## 2019-09-17 DIAGNOSIS — H40.52X3 NEOVASCULAR GLAUCOMA OF LEFT EYE, SEVERE STAGE: Primary | ICD-10-CM

## 2019-09-17 DIAGNOSIS — Z96.89 HISTORY OF GLAUCOMA TUBE SHUNT PROCEDURE: ICD-10-CM

## 2019-09-17 DIAGNOSIS — H40.51X1 NEOVASCULAR GLAUCOMA, RIGHT EYE, MILD STAGE: ICD-10-CM

## 2019-09-17 DIAGNOSIS — H21.1X1 RUBEOSIS IRIDIS, RIGHT: ICD-10-CM

## 2019-09-17 DIAGNOSIS — I70.244 ATHEROSCLEROSIS OF NATIVE ARTERY OF LEFT LOWER EXTREMITY WITH ULCERATION OF MIDFOOT: Primary | ICD-10-CM

## 2019-09-17 DIAGNOSIS — H35.373 EPIRETINAL MEMBRANE, BOTH EYES: ICD-10-CM

## 2019-09-17 PROCEDURE — 99999 PR PBB SHADOW E&M-EST. PATIENT-LVL II: ICD-10-PCS | Mod: PBBFAC,,, | Performed by: OPHTHALMOLOGY

## 2019-09-17 PROCEDURE — 11045 DBRDMT SUBQ TISS EACH ADDL: CPT | Performed by: FAMILY MEDICINE

## 2019-09-17 PROCEDURE — 11042 DBRDMT SUBQ TIS 1ST 20SQCM/<: CPT | Performed by: FAMILY MEDICINE

## 2019-09-17 PROCEDURE — 99214 OFFICE O/P EST MOD 30 MIN: CPT | Mod: ,,, | Performed by: FAMILY MEDICINE

## 2019-09-17 PROCEDURE — 92012 PR EYE EXAM, EST PATIENT,INTERMED: ICD-10-PCS | Mod: S$GLB,,, | Performed by: OPHTHALMOLOGY

## 2019-09-17 PROCEDURE — 25000003 PHARM REV CODE 250

## 2019-09-17 PROCEDURE — 99214 PR OFFICE/OUTPT VISIT, EST, LEVL IV, 30-39 MIN: ICD-10-PCS | Mod: ,,, | Performed by: FAMILY MEDICINE

## 2019-09-17 PROCEDURE — 99999 PR PBB SHADOW E&M-EST. PATIENT-LVL II: CPT | Mod: PBBFAC,,, | Performed by: OPHTHALMOLOGY

## 2019-09-17 PROCEDURE — 27201912 HC WOUND CARE DEBRIDEMENT SUPPLIES: Performed by: FAMILY MEDICINE

## 2019-09-17 PROCEDURE — 92012 INTRM OPH EXAM EST PATIENT: CPT | Mod: S$GLB,,, | Performed by: OPHTHALMOLOGY

## 2019-09-17 NOTE — PROGRESS NOTES
HPI     DLS: 5/24/19    Pt here for 4 month check; (HRT machine is broken);    Meds: Cosopt BID OU   Alphagan BID OU     1. NVG OS   2. POAG OD   3. PDR with CSME OU   4. DME OU   5. NS OD   5. PCIOL OS   6. ERM OS         Last edited by Brenda Don on 9/17/2019  1:37 PM. (History)            Assessment /Plan     For exam results, see Encounter Report.    Neovascular glaucoma of left eye, severe stage    Neovascular glaucoma, right eye, mild stage    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 edema, with long-term current use of insulin    Epiretinal membrane, both eyes    Pseudophakia    Rubeosis iridis, right    History of glaucoma tube shunt procedure        Pt has been followed in the retina and glaucoma clinics at [facility] for many years  Now is transferring to [facility] - main campus (2016)   S/P PRP ou for PDR ou   S/P multiple avastin injections ou  + NVG os // ? POAG od   S/P ahmed os 2011  S/P phaco/IOL / baerveldt os 2012       1. NVG OS 2/2 PDR // ?? POAG od - on gtts    First HVF   ? 10/2014    First photos   6/2016   Treatment / Drops started   Years ago           Family history    ?        Glaucoma meds    cosopt ou / alphagan ou         H/O adverse rxn to glaucoma drops    ? Try and avoid PG's - PDR with ME and NVG os         LASERS    SLT - ? Os or OU // G6 laser os // PRP ou         GLAUCOMA SURGERIES    ahmed os // baerveldt os - both at [facility] // G6 laser os 2/15/2017        OTHER EYE SURGERIES    Phaco/IOL od 9/26/2018         CDR    0.4/0.9        Tbase    ??  (on gtts at [facility] from 2014 to 2016  ran - 12-19 od // 16-25 os )         Tmax    ?             Ttarget    ?             HVF    4 test 2014 to  2015 - [facility] 2 test 2016 to 2019  -24-2 ss  SAD/IAD od // 24-2 stim V gen dep - SAD / IAD os         Gonio    +3 od // +3 sup os with PAS T/N/I         CCT    449/540        OCT    2 test 2016 to 2018 - RNFL - dec S/I od   od // dec S/I, bord N  os        HRT    2 test 2017 to 2018 - MR - nl  od // dec TI/ bord NI  os        Disc photos    2016     - Ttoday    11/11  - Test done today    IOP check      - OFF -  Xalatan OD due to macular edema  - pt reports good adherence    PLAN   Cont  cosopt bid ou, brimonidine bid ou        2.  PDR s/p PRP OU (DM2), with CSME OU (and HTN)  - Encouraged good BS/BP/Cholesterol control    3. DME OU  OD>OS  S/p Avastin OD x 9, OS x 7  Will monitor OS for now given NVG and ERM      4. PSK OS  - Stable, CTM, RD precautions       5. ERM OS  Given NVG hx, monitor  - CTM    6. PC IOL OU    OD 9/26/2018 - pcb00 17.0   Os       Cont cosopt ou tid   Cont alphagan tid ou   Stay off  latanoprost - - 2/2 DME od     H/O  RUBEOSIS AT THE PUPIL OD AND 1 SMALL AREA OF ANGLE VIRI ING OD (post CE)    S/P more avastin od 10/9/2018 - [facility] - rubeosis regressed    S/p fill in PRP od - 11/26/2018 - [facility] - rubeosis regressed    S/p MORE AVASTIN OD 1/25/2019    S/p LIAM OD 7/2019     VA good - BCVA - 20/40 - 11/30/2018    If IOP goes up OS  can re-start PG's os and / or repeat cyclo G6    5/24/2019 - pt has been very ill   + CHF   + PVD - had toes amputated left foot   -S/P angioplasty to open leg vessels   Out of hospital and at home - now - wound care continues         TODAY  PLAN   Continue glaucoma eye drops   Tube exposed OS today inferior tube 2-3 mm exposed , hamlet negative   Called Dr. Cortés     Will start moxifloxacin QID OS until follow up   RTC 1 week for AC and tube check       Joel Aden MD    Discussed with Dr. Cortés who agrees

## 2019-09-18 ENCOUNTER — PATIENT OUTREACH (OUTPATIENT)
Dept: ADMINISTRATIVE | Facility: OTHER | Age: 59
End: 2019-09-18

## 2019-09-20 ENCOUNTER — OFFICE VISIT (OUTPATIENT)
Dept: CARDIOLOGY | Facility: CLINIC | Age: 59
End: 2019-09-20
Payer: COMMERCIAL

## 2019-09-20 VITALS
RESPIRATION RATE: 15 BRPM | HEART RATE: 75 BPM | WEIGHT: 201.75 LBS | OXYGEN SATURATION: 95 % | BODY MASS INDEX: 30.58 KG/M2 | SYSTOLIC BLOOD PRESSURE: 126 MMHG | DIASTOLIC BLOOD PRESSURE: 72 MMHG | HEIGHT: 68 IN

## 2019-09-20 DIAGNOSIS — E78.2 MIXED HYPERLIPIDEMIA: Chronic | ICD-10-CM

## 2019-09-20 DIAGNOSIS — I73.9 PVD (PERIPHERAL VASCULAR DISEASE): Chronic | ICD-10-CM

## 2019-09-20 DIAGNOSIS — Z01.810 PREOP CARDIOVASCULAR EXAM: Primary | ICD-10-CM

## 2019-09-20 DIAGNOSIS — I50.43 ACUTE ON CHRONIC COMBINED SYSTOLIC AND DIASTOLIC CONGESTIVE HEART FAILURE: ICD-10-CM

## 2019-09-20 DIAGNOSIS — I10 ESSENTIAL HYPERTENSION: Chronic | ICD-10-CM

## 2019-09-20 PROCEDURE — 93000 EKG 12-LEAD: ICD-10-PCS | Mod: S$GLB,,, | Performed by: INTERNAL MEDICINE

## 2019-09-20 PROCEDURE — 99214 PR OFFICE/OUTPT VISIT, EST, LEVL IV, 30-39 MIN: ICD-10-PCS | Mod: S$GLB,,, | Performed by: INTERNAL MEDICINE

## 2019-09-20 PROCEDURE — 99214 OFFICE O/P EST MOD 30 MIN: CPT | Mod: S$GLB,,, | Performed by: INTERNAL MEDICINE

## 2019-09-20 PROCEDURE — 3078F DIAST BP <80 MM HG: CPT | Mod: CPTII,S$GLB,, | Performed by: INTERNAL MEDICINE

## 2019-09-20 PROCEDURE — 3074F PR MOST RECENT SYSTOLIC BLOOD PRESSURE < 130 MM HG: ICD-10-PCS | Mod: CPTII,S$GLB,, | Performed by: INTERNAL MEDICINE

## 2019-09-20 PROCEDURE — 99999 PR PBB SHADOW E&M-EST. PATIENT-LVL III: CPT | Mod: PBBFAC,,, | Performed by: INTERNAL MEDICINE

## 2019-09-20 PROCEDURE — 99999 PR PBB SHADOW E&M-EST. PATIENT-LVL III: ICD-10-PCS | Mod: PBBFAC,,, | Performed by: INTERNAL MEDICINE

## 2019-09-20 PROCEDURE — 3008F PR BODY MASS INDEX (BMI) DOCUMENTED: ICD-10-PCS | Mod: CPTII,S$GLB,, | Performed by: INTERNAL MEDICINE

## 2019-09-20 PROCEDURE — 3078F PR MOST RECENT DIASTOLIC BLOOD PRESSURE < 80 MM HG: ICD-10-PCS | Mod: CPTII,S$GLB,, | Performed by: INTERNAL MEDICINE

## 2019-09-20 PROCEDURE — 93000 ELECTROCARDIOGRAM COMPLETE: CPT | Mod: S$GLB,,, | Performed by: INTERNAL MEDICINE

## 2019-09-20 PROCEDURE — 3074F SYST BP LT 130 MM HG: CPT | Mod: CPTII,S$GLB,, | Performed by: INTERNAL MEDICINE

## 2019-09-20 PROCEDURE — 3008F BODY MASS INDEX DOCD: CPT | Mod: CPTII,S$GLB,, | Performed by: INTERNAL MEDICINE

## 2019-09-20 NOTE — PROGRESS NOTES
CARDIOVASCULAR PROGRESS NOTE    REASON FOR CONSULT:   Marvin Ray is a 59 y.o. male who presents for follow up of CAD/ICM.    PCP: Susan Damonc: Dustin  Optho: [facility]  HISTORY OF PRESENT ILLNESS:   Previously followed by Dr. Greer, last seen March 2019.    Patient comes in for follow-up, accompanied by his wife.  He reports generally stable status without angina or dyspnea.  He does report generalized fatigue.  He has had no palpitations, lightheadedness, dizziness, syncope, or defibrillator discharges.  There has been no PND, orthopnea, melena, or hematuria.  He states his left lower extremity wounds are healing up nicely.    The patient also tells me he is due to have ophthalmological surgery.  This seems to be a low risk procedure.  The patient does have known multivessel CAD and deemed to not be a surgical candidate previously.  He appears to be stable from this perspective, I feel that he is at acceptable, albeit not low, cardiac risk for surgery and anesthesia as deemed necessary.    CARDIOVASCULAR HISTORY:   [employer] [facility] ICD  CAD/ICM, nonoperable per [facility] note 5/2016  PAD    PAST MEDICAL HISTORY:     Past Medical History:   Diagnosis Date    Arthritis     Cellulitis     CKD (chronic kidney disease), stage III     Coronary artery disease     Diabetes mellitus     Diabetic retinopathy     Diabetic ulcer of left foot     Glaucoma     Gout     Hyperlipemia     Hypertension     ICD (implantable cardioverter-defibrillator) in place 11/02/2018    Left chest    Non-pressure chronic ulcer of other part of left foot with fat layer exposed 10/23/2018    PVD (peripheral vascular disease)     Type 2 diabetes mellitus with left diabetic foot ulcer 10/29/2018    Unsteady gait     uses a wheelchair       PAST SURGICAL HISTORY:     Past Surgical History:   Procedure Laterality Date    AHMED GLAUCOMA IMPLANT Left 2011    DONE AT [facility], TOE Left 12/5/2018    Performed by Mitch BAINS  MD Dustin at [facility] OR    AMPUTATION, TOES  2-5 Left 11/16/2018    Performed by Mitch Chan MD at [facility] OR    AORTOGRAM, WITH SERIALOGRAPHY, LEFT LOWER EXTREMITY, POSSIBLE INTERVENTION Left 4/30/2019    Performed by Mitch Chan MD at [facility] OR    BAERVELDT GLAUCOMA IMPLANT Left 2012    WITH CATARACT EXTRACTION//DONE AT [facility]    CARDIAC CATHETERIZATION Left 05/2016    CARDIAC DEFIBRILLATOR PLACEMENT Left 11/02/2018    CATARACT EXTRACTION W/  INTRAOCULAR LENS IMPLANT Left 2012    WITH BAERVEDT//DONE AT [facility]    CATARACT EXTRACTION W/  INTRAOCULAR LENS IMPLANT Right 09/26/2018    COMPLEX ()    CB DESTRUCTION WITH CYCLO G6 Left 02/15/2017        CYST REMOVAL      DEBRIDEMENT, FOOT  6/5/2019    Performed by Mitch Chan MD at [facility] OR    DEBRIDEMENT, FOOT  12/28/2018    Performed by Mitch Chan MD at [facility] OR    Exam under anesthesia, left foot debridement, left foot washout, possible left second through fifth metatarsal resection Left 12/28/2018    Performed by Mitch Chan MD at [facility] OR    Exam under anesthesia, left foot debridement, washout and all other indicated procedures Left 12/5/2018    Performed by Mitch Chan MD at [facility] OR    EXCISION, LESION, METATARSAL BONE  12/28/2018    Performed by Mitch Chan MD at [facility] OR    HEART CATH-LEFT N/A 5/6/2016    Performed by Mike Magana MD at [facility] CATH LAB    INCISION AND DRAINAGE Left 11/16/2018    Performed by Mitch Chan MD at [facility] OR    Incision and Drainage, left lower extremity debridement, washout Left 11/14/2018    Performed by Mitch Chan MD at [facility] OR    INSERTION, ICD GENERATOR, SINGLE CHAMBER N/A 11/2/2018    Performed by Pernell Greer MD at [facility] CATH LAB    INSERTION, IOL PROSTHESIS Right 9/26/2018    Performed by Perla Cortés MD at [facility] OR 1ST FLR    LEFT FOOT DEBRIDEMENT, WASHOUT AND ALL OTHER INDICATED PROCEDURES Left  6/5/2019    Performed by Mitch Chan MD at [facility] OR    LEFT FOOT WOUND DEBRIDEMENT, WASHOUT AND ALL OTHER INDICATED PROCEDURES Left 2/13/2019    Performed by Mitch Chan MD at [facility] OR    PARACENTESIS, ABDOMINAL, INITIAL N/A 8/16/2019    Performed by [facility] Provider at [facility] OR    PARACENTESIS, ABDOMINAL, INITIAL N/A 7/22/2019    Performed by [facility] at [facility] OR    PARACENTESIS, ABDOMINAL, INITIAL N/A 3/25/2019    Performed by [facility] at [facility] OR    PARACENTESIS, ABDOMINAL, INITIAL N/A 3/1/2019    Performed by [facility] at [facility] OR    PARACENTESIS, ABDOMINAL, REPEAT N/A 9/12/2019    Performed by [facility] at [facility] OR    PARACENTESIS, ABDOMINAL, REPEAT N/A 2/11/2019    Performed by [facility] at [facility] OR    PHACOEMULSIFICATION, CATARACT Right 9/26/2018    Performed by Perla Cortés MD at [facility] OR 1ST FLR    Right foot surgery  10/2014    TOE AMPUTATION Right     first and second    TONSILLECTOMY      TRABECULECTOMY/G 6 LASER Left 2/15/2017    Performed by Perla Cortés MD at [facility] OR 1ST FLR    WASHOUT  6/5/2019    Performed by Mitch Chan MD at [facility] OR       ALLERGIES AND MEDICATION:     Review of patient's allergies indicates:   Allergen Reactions    Statins-hmg-coa reductase inhibitors      Generalized Pain    Onglyza [saxagliptin]     Penicillins Rash        Medication List           Accurate as of 9/20/19 10:28 AM. If you have any questions, ask your nurse or doctor.               CONTINUE taking these medications    allopurinol 300 MG tablet  Commonly known as:  ZYLOPRIM  Take 1 tablet (300 mg total) by mouth once daily.     aspirin 325 MG tablet     bisacodyl 5 mg EC tablet  Commonly known as:  DULCOLAX     brimonidine 0.2% 0.2 % Drop  Commonly known as:  ALPHAGAN  Place 1 drop into both eyes 2 (two) times daily.     carvedilol 3.125 MG tablet  Commonly known as:  COREG  Take 1  "tablet (3.125 mg total) by mouth 2 (two) times daily.     coenzyme Q10 100 mg capsule     dorzolamide-timolol 2-0.5% 22.3-6.8 mg/mL ophthalmic solution  Commonly known as:  COSOPT  Place 1 drop into both eyes 2 (two) times daily.     doxycycline 100 MG Cap  Commonly known as:  VIBRAMYCIN  Take 1 capsule (100 mg total) by mouth every 12 (twelve) hours.     ezetimibe 10 mg tablet  Commonly known as:  ZETIA  TAKE 1 TABLET(10 MG) BY MOUTH EVERY DAY     fish oil-omega-3 fatty acids 300-1,000 mg capsule  Take 1 capsule by mouth 2 (two) times daily.     insulin aspart U-100 100 unit/mL (3 mL) Inpn pen  Commonly known as:  NovoLOG  Use on premeal readings: 150-200=+2, 201-250=+4; 251-300=+6; 301-350=+8, over 350=+10 units     insulin degludec-liraglutide 100 unit-3.6 mg /mL (3 mL) Inpn  Commonly known as:  XULTOPHY 100/3.6  Inject 42 Units into the skin once daily.     multivitamin Tab     pen needle, diabetic 31 gauge x 1/4" Ndle  Use as directed     torsemide 20 MG Tab  Commonly known as:  DEMADEX  TAKE 4 TABLETS BY MOUTH TWICE DAILY            SOCIAL HISTORY:     Social History     Socioeconomic History    Marital status: Single     Spouse name: Not on file    Number of children: Not on file    Years of education: 14    Highest education level: Not on file   Occupational History    Not on file   Social Needs    Financial resource strain: Not on file    Food insecurity:     Worry: Not on file     Inability: Not on file    Transportation needs:     Medical: Not on file     Non-medical: Not on file   Tobacco Use    Smoking status: Former Smoker     Packs/day: 1.00     Years: 3.00     Pack years: 3.00     Types: Cigarettes     Last attempt to quit: 1984     Years since quittin.2    Smokeless tobacco: Never Used   Substance and Sexual Activity    Alcohol use: Not Currently     Alcohol/week: 0.6 oz     Types: 1 Cans of beer per week     Comment: rarely    Drug use: No    Sexual activity: Not Currently    "  Partners: Female   Lifestyle    Physical activity:     Days per week: Not on file     Minutes per session: Not on file    Stress: Not on file   Relationships    Social connections:     Talks on phone: Not on file     Gets together: Not on file     Attends Yarsanism service: Not on file     Active member of club or organization: Not on file     Attends meetings of clubs or organizations: Not on file     Relationship status: Not on file   Other Topics Concern    Not on file   Social History Narrative    Not on file       FAMILY HISTORY:     Family History   Problem Relation Age of Onset    Diabetes Mother     Heart disease Brother     No Known Problems Father     No Known Problems Sister     No Known Problems Maternal Aunt     No Known Problems Maternal Uncle     No Known Problems Paternal Aunt     No Known Problems Paternal Uncle     No Known Problems Maternal Grandmother     No Known Problems Maternal Grandfather     No Known Problems Paternal Grandmother     No Known Problems Paternal Grandfather     Anemia Neg Hx     Arrhythmia Neg Hx     Asthma Neg Hx     Clotting disorder Neg Hx     Fainting Neg Hx     Heart attack Neg Hx     Heart failure Neg Hx     Hyperlipidemia Neg Hx     Hypertension Neg Hx     Stroke Neg Hx     Atrial Septal Defect Neg Hx     Amblyopia Neg Hx     Blindness Neg Hx     Glaucoma Neg Hx     Macular degeneration Neg Hx     Retinal detachment Neg Hx     Strabismus Neg Hx        REVIEW OF SYSTEMS:   Review of Systems   Constitutional: Positive for malaise/fatigue. Negative for chills, diaphoresis and fever.   HENT: Negative for nosebleeds.    Eyes: Negative for blurred vision, double vision and photophobia.   Respiratory: Negative for hemoptysis, shortness of breath and wheezing.    Cardiovascular: Negative for chest pain, palpitations, orthopnea, claudication, leg swelling and PND.   Gastrointestinal: Negative for abdominal pain, blood in stool, heartburn,  "melena, nausea and vomiting.   Genitourinary: Negative for flank pain and hematuria.   Musculoskeletal: Negative for falls, myalgias and neck pain.   Skin: Negative for rash.   Neurological: Negative for dizziness, seizures, loss of consciousness, weakness and headaches.   Endo/Heme/Allergies: Negative for polydipsia. Does not bruise/bleed easily.   Psychiatric/Behavioral: Negative for depression and memory loss. The patient is not nervous/anxious.        PHYSICAL EXAM:     Vitals:    09/20/19 0939   BP: 126/72   Pulse: 75   Resp: 15    Body mass index is 30.67 kg/m².  Weight: 91.5 kg (201 lb 11.5 oz)   Height: 5' 8" (172.7 cm)     Physical Exam   Constitutional: He is oriented to person, place, and time. He appears well-developed and well-nourished. He is cooperative.  Non-toxic appearance. No distress.   HENT:   Head: Normocephalic and atraumatic.   Eyes: Pupils are equal, round, and reactive to light. Conjunctivae and EOM are normal. No scleral icterus.   Neck: Trachea normal and normal range of motion. Neck supple. No JVD present. No neck rigidity. No tracheal deviation and no edema present. No thyromegaly present.   Cardiovascular: Normal rate, regular rhythm, S1 normal and S2 normal. PMI is not displaced. Exam reveals no gallop and no friction rub.   No murmur heard.  Pulmonary/Chest: Effort normal and breath sounds normal. No stridor. No respiratory distress. He has no wheezes. He has no rales. He exhibits no tenderness.   Abdominal: Soft. He exhibits no distension. There is no hepatosplenomegaly.   Musculoskeletal: Normal range of motion. He exhibits no edema or tenderness.   L foot in boot   Feet:   Right Foot:   Skin Integrity: Negative for ulcer.   Left Foot:   Skin Integrity: Negative for ulcer.   Neurological: He is alert and oriented to person, place, and time. No cranial nerve deficit.   Skin: Skin is warm and dry. No rash noted. No erythema.   Psychiatric: He has a normal mood and affect. His speech " is normal and behavior is normal.   Vitals reviewed.      DATA:   EKG: (personally reviewed tracing)  9/20/19 SR 74, ant MI-age indet, PVC    Laboratory:  CBC:  Recent Labs   Lab 04/23/19  1255 05/31/19  1025 08/22/19  0820   WBC 6.77 6.61 5.13   Hemoglobin 9.1 L 9.3 L 9.9 L   Hematocrit 28.8 L 29.1 L 32.0 L   Platelets 357 H 303 319       CHEMISTRIES:  Recent Labs   Lab 03/12/19  0558  04/23/19  1255  05/31/19  1025 06/04/19  1300 06/10/19  1407 08/22/19  0820   Glucose 78   < > 111 H   < > 135 H 149 H 128 H 83   Sodium 138   < > 133 L   < > 133 L 135 L 136 140   Potassium 3.2 L   < > 3.4 L   < > 2.7 LL 3.3 L 4.2 3.6   BUN, Bld 71 H   < > 112 H   < > 64 H 63 H 51 H 39 H   Creatinine 1.2   < > 1.9 H   < > 1.3 1.2 1.3 1.1   eGFR if  >60.0   < > 43 A   < > >60 >60 >60 >60   eGFR if non  >60.0   < > 37 A   < > 59 A >60 59 A >60   Calcium 8.9   < > 9.3   < > 9.3 9.1 8.9 8.8   Magnesium 1.8  --  1.7  --  2.1  --   --   --     < > = values in this interval not displayed.       CARDIAC BIOMARKERS:  Recent Labs   Lab 12/11/18 2024 12/12/18  0226  01/30/19  1630 02/01/19  1100 02/05/19  1530 03/11/19  0335   CPK  --   --    < > 598 H 384 H 198  --    Troponin I 0.449 H 0.338 H  --   --   --   --  0.013    < > = values in this interval not displayed.       COAGS:  Recent Labs   Lab 03/10/19  2349 04/23/19  1255 05/31/19  1025   INR 1.0 1.0 1.1       LIPIDS/LFTS:  Recent Labs   Lab 10/12/16  1227 07/27/18  0820  10/30/18  0955  03/18/19 0830 03/25/19 0920 08/22/19 0820   Cholesterol 174 151  --  107 L  --   --   --   --    Triglycerides 80 83  --  44  --   --   --   --    HDL 35 L 29 L  --  27 L  --   --   --   --    LDL Cholesterol 123.0 105.4  --  71.2  --   --   --   --    Non-HDL Cholesterol 139 122  --  80  --   --   --   --    AST 16 27   < >  --    < > 16 14 14   ALT 15 28   < >  --    < > 14 15 12    < > = values in this interval not displayed.       Cardiovascular Testing:  Echo  12/11/18  · Severely decreased left ventricular systolic function. The estimated ejection fraction is 20%  · Severe global hypokinetic wall motion. Cannot exclude RWMA.  · Septal wall has abnormal motion. Systolic and diastolic flattening of the interventricular septum consistent with right ventricle pressure and volume overload.  · Left ventricular diastolic dysfunction.  · Mild right ventricular enlargement.  · Mildly to moderately reduced right ventricular systolic function.  · Moderate tricuspid regurgitation.  · There is a large left pleural effusion.    L MPI 10/17/19  · Abnormal myocardial perfusion study  · There is a moderate amount of infarct in the inferior wall(s).In the typical distribution of the RCA territory.  · Post Stress Ejection Fraction is 17 %    Cath 5/6/16 (subsequently seen by Dr. Mathews 5/12/16 and deemed to not be a surgical candidate)  B. Summary/Post-Operative Diagnosis    Three vessel coronary artery disease.    LV systolic and diastolic dysfunction.  D. Hemodynamic Results  LVEDP (Pre): 25 mmHg  LVEDP (Post): 30 mmHg  Ejection Fraction: 35%  E. Angiographic Results       Patient has a right dominant coronary artery.      - Left Main Coronary Artery:             The LM has luminal irregularities. There is BAKARI 3 flow.     - Left Anterior Descending Artery:             The proximal LAD has a 80% stenosis. There is BAKARI 3 flow.             The mid LAD has a 90% stenosis. There is BAKARI 3 flow. The remaining portion of the vessel is diffusely diseased.     - Left Circumflex Artery:             The mid LCX has a 60% stenosis. There is BAKARI 3 flow. The remaining portion of the vessel has luminal irregularities.     - Right Coronary Artery:             The proximal RCA has a 60% stenosis. There is BAKARI 3 flow.             The mid RCA has a 80% stenosis. There is BAKARI 3 flow. The remaining portion of the vessel has luminal irregularities.     - Posterior Descending Artery:             The  proximal PDA has a 60% stenosis. There is BAKARI 3 flow. The remaining portion of the vessel has luminal irregularities.     - Radial:             The radial.  G. Recommendations  1. Routine post-cath care.  2. Risk factor reductions.  3. ASA 81mg.  4. Statin therapy.  5. Consult CV Surgery.      ASSESSMENT:   # preop for optho surgery, pt at low-int cardiac risk (low risk surgery), no preop cardiac testing needed  # CAD/ICM, clinically stable  # [employer] ICD  # Statin allergy on zetia  # PAD followed by Dr. Chan    PLAN:   The patient is at low-intermediate cardiac risk for the planned ophthalmologic procedure and anesthesia as deemed necessary.  I do not feel preoperative cardiac testing will change his risk profile.  Continue medical therapy.  Decrease aspirin to 81 mg daily.  Return to clinic 3 months with [facility] ICD check.      Ismael Romano MD, FACC

## 2019-09-22 RX ORDER — TORSEMIDE 20 MG/1
TABLET ORAL
Qty: 120 TABLET | Refills: 0 | Status: SHIPPED | OUTPATIENT
Start: 2019-09-22 | End: 2019-10-12 | Stop reason: SDUPTHER

## 2019-09-23 ENCOUNTER — OFFICE VISIT (OUTPATIENT)
Dept: OPHTHALMOLOGY | Facility: CLINIC | Age: 59
End: 2019-09-23
Payer: COMMERCIAL

## 2019-09-23 ENCOUNTER — TELEPHONE (OUTPATIENT)
Dept: OPHTHALMOLOGY | Facility: CLINIC | Age: 59
End: 2019-09-23

## 2019-09-23 DIAGNOSIS — E11.3511 DIABETIC MACULAR EDEMA OF RIGHT EYE WITH PROLIFERATIVE RETINOPATHY ASSOCIATED WITH TYPE 2 DIABETES MELLITUS: ICD-10-CM

## 2019-09-23 DIAGNOSIS — Z96.89 HISTORY OF GLAUCOMA TUBE SHUNT PROCEDURE: ICD-10-CM

## 2019-09-23 DIAGNOSIS — H40.52X3 NEOVASCULAR GLAUCOMA, LEFT EYE, SEVERE STAGE: Primary | ICD-10-CM

## 2019-09-23 DIAGNOSIS — T85.9XXA DISORDER DUE TO INSERTION OF GLAUCOMA DRAINAGE DEVICE: Primary | ICD-10-CM

## 2019-09-23 DIAGNOSIS — H40.52X3 NEOVASCULAR GLAUCOMA OF LEFT EYE, SEVERE STAGE: ICD-10-CM

## 2019-09-23 DIAGNOSIS — H21.1X1 RUBEOSIS IRIDIS, RIGHT: ICD-10-CM

## 2019-09-23 DIAGNOSIS — H35.373 EPIRETINAL MEMBRANE, BOTH EYES: ICD-10-CM

## 2019-09-23 DIAGNOSIS — E11.3512 DIABETIC MACULAR EDEMA OF LEFT EYE WITH PROLIFERATIVE RETINOPATHY ASSOCIATED WITH TYPE 2 DIABETES MELLITUS: ICD-10-CM

## 2019-09-23 DIAGNOSIS — H40.51X1 NEOVASCULAR GLAUCOMA, RIGHT EYE, MILD STAGE: ICD-10-CM

## 2019-09-23 DIAGNOSIS — Z96.1 PSEUDOPHAKIA: ICD-10-CM

## 2019-09-23 PROCEDURE — 99999 PR PBB SHADOW E&M-EST. PATIENT-LVL II: CPT | Mod: PBBFAC,,, | Performed by: OPHTHALMOLOGY

## 2019-09-23 PROCEDURE — 92012 PR EYE EXAM, EST PATIENT,INTERMED: ICD-10-PCS | Mod: S$GLB,,, | Performed by: OPHTHALMOLOGY

## 2019-09-23 PROCEDURE — 92012 INTRM OPH EXAM EST PATIENT: CPT | Mod: S$GLB,,, | Performed by: OPHTHALMOLOGY

## 2019-09-23 PROCEDURE — 99999 PR PBB SHADOW E&M-EST. PATIENT-LVL II: ICD-10-PCS | Mod: PBBFAC,,, | Performed by: OPHTHALMOLOGY

## 2019-09-23 RX ORDER — SODIUM CHLORIDE 9 MG/ML
INJECTION, SOLUTION INTRAVENOUS CONTINUOUS
Status: CANCELLED | OUTPATIENT
Start: 2019-09-23

## 2019-09-23 RX ORDER — LIDOCAINE HYDROCHLORIDE 10 MG/ML
1 INJECTION, SOLUTION EPIDURAL; INFILTRATION; INTRACAUDAL; PERINEURAL ONCE
Status: CANCELLED | OUTPATIENT
Start: 2019-09-23 | End: 2019-09-23

## 2019-09-23 RX ORDER — MOXIFLOXACIN 5 MG/ML
1 SOLUTION/ DROPS OPHTHALMIC
Status: CANCELLED | OUTPATIENT
Start: 2019-09-23

## 2019-09-23 NOTE — PROGRESS NOTES
HPI     1 Week return per Dr. Aden for AC and tube check.  Pt states that vision seems to be stable OU since last exam a wk ago with   Dr. Aden. Pt admits to occ pain in OS but only once in a while. Pt   reports he was now put on an antibiotic gtt in his OS. \    1. NVG OS  2. POAG OD  3. PDRw/ CSME OU  4. DME ou   5. NS OD   5. PC IOL OS  6. ERM os     Pt confirms using  Cosopt BID OU  Brimonidine BID OU  Moxifloxacin QID OS      Last edited by Perla Cortés MD on 9/23/2019  3:20 PM. (History)            Assessment /Plan     For exam results, see Encounter Report.    Neovascular glaucoma of left eye, severe stage    Neovascular glaucoma, right eye, mild stage    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 edema, with long-term current use of insulin    Epiretinal membrane, both eyes    Rubeosis iridis, right    Pseudophakia    History of glaucoma tube shunt procedure    Diabetic macular edema of right eye with proliferative retinopathy associated with type 2 diabetes mellitus    Diabetic macular edema of left eye with proliferative retinopathy associated with type 2 diabetes mellitus            F/u TUBE ERROSION THROUGH CONJUNCTIVA     Pt has been followed in the retina and glaucoma clinics at [facility] for many years  Now is transferring to [facility] - main campus (2016)   S/P PRP ou for PDR ou   S/P multiple avastin injections ou  + NVG os // ? POAG od   S/P ahmed os 2011  S/P phaco/IOL / baerveldt os 2012       1. NVG OS 2/2 PDR // ?? POAG od - on gtts    First HVF   ? 10/2014    First photos   6/2016   Treatment / Drops started   Years ago           Family history    ?        Glaucoma meds    cosopt ou / alphagan ou         H/O adverse rxn to glaucoma drops    ? Try and avoid PG's - PDR with ME and NVG os         LASERS    SLT - ? Os or OU // G6 laser os // PRP ou         GLAUCOMA SURGERIES    [facility] os- 2011 - [facility] - ? ST  //  bacamrelaldt os - 2012 - ? [place]  - both at [facility] /   / G6 laser os 2/15/2017        OTHER EYE SURGERIES    Phaco/IOL od 9/26/2018         CDR    0.4/0.9        Tbase    ??  (on gtts at [facility] from 2014 to 2016  ran - 12-19 od // 16-25 os )         Tmax    ?             Ttarget    ?             HVF    4 test 2014 to  2015 - luis -     daphniespaolo 2 test 2016 to 2019  -24-2 ss  SAD/IAD od // 24-2 stim V gen dep - SAD / IAD os         Gonio    +3 od // +3 sup os with PAS T/N/I         CCT    449/540        OCT    2 test 2016 to 2018 - RNFL - dec S/I od  od // dec S/I, bord N  os        HRT    2 test 2017 to 2018 - MR - nl  od // dec TI/ bord NI  os        Disc photos    2016     - Ttoday    12/12  - Test done today    F/U GDD tube errosion OS of the infero-nasal tube      - OFF -  Xalatan OD due to macular edema  - pt reports good adherence  Cont  cosopt bid ou, brimonidine bid ou        2.  PDR s/p PRP OU (DM2), with CSME OU (and HTN)  - Encouraged good BS/BP/Cholesterol control    3. DME OU  OD>OS  S/p Avastin OD x 9, OS x 7  Will monitor OS for now given NVG and ERM      4. PSK OS  - Stable, CTM, RD precautions       5. ERM OS  Given NVG hx, monitor  - CTM    6. PC IOL OU    OD 9/26/2018 - pcb00 17.0   Os       Cont cosopt ou tid   Cont alphagan tid ou   Stay off  latanoprost - - 2/2 DME od     H/O  RUBEOSIS AT THE PUPIL OD AND 1 SMALL AREA OF ANGLE VIRI ING OD (post CE)    S/P more avastin od 10/9/2018 - [place] - rubeosis regressed    S/p fill in PRP od - 11/26/2018 - [place] - rubeosis regressed    S/p MORE AVASTIN OD 1/25/2019     VA good - BCVA - 20/40 - 11/30/2018    If IOP goes up OS  can re-start PG's os and / or repeat cyclo G6    5/24/2019 - pt has been very ill   + CHF   + PVD - had toes amputated left foot   -S/P angioplasty to open leg vessels   Out of hospital and at home - now - wound care continues     GDD - tube errosion of the IN tube -    Suggest revision with pericardial patch graft and free conj  graft vs explantation and cutting off of the tube and patching over the sclerostomy. If the IOP goes up could have more G6 laser . The truncation of the tube and removal would be the quickest and most definitive. Even with a free conj graft - it might break down again .     Options discussed and will proceed with revision / partial explantation and patch graft - OS     Risks and benefits discussed and consent signed.    I have seen and personally examined the patient.  I agree with the findings, assessment and plan of the resident and/or fellow.     Perla Cortés MD

## 2019-09-23 NOTE — H&P (VIEW-ONLY)
Subjective:       Patient ID: Marvin Ray is a 59 y.o. male.    Chief Complaint: Glaucoma    HPI  Review of Systems   Constitutional: Negative.    HENT: Negative.    Eyes: Positive for visual disturbance.   Respiratory: Negative.    Cardiovascular: Negative.    Gastrointestinal: Negative.    Endocrine: Negative.    Genitourinary: Negative.        Objective:      Physical Exam   Constitutional: He is oriented to person, place, and time. He appears well-nourished.   HENT:   Head: Normocephalic and atraumatic.   Cardiovascular: Normal rate.   Pulmonary/Chest: Effort normal.   Neurological: He is alert and oriented to person, place, and time.   Skin: Skin is warm and dry.   Psychiatric: He has a normal mood and affect.       Assessment:       1. Neovascular glaucoma of left eye, severe stage    2. Neovascular glaucoma, right eye, mild stage    3. 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 edema, with long-term current use of insulin    4. Epiretinal membrane, both eyes    5. Rubeosis iridis, right    6. Pseudophakia    7. History of glaucoma tube shunt procedure    8. Diabetic macular edema of right eye with proliferative retinopathy associated with type 2 diabetes mellitus    9. Diabetic macular edema of left eye with proliferative retinopathy associated with type 2 diabetes mellitus        Plan:       ERROSION // EXTRUSION  OF GDD IN THE INFER-NASAL QUADRANT LEFT EYE   PLAN REVISION AND/OF EXPLANTATION AND CLOSURE OF SCLEROSTOMY WITH PATCH GRAFT

## 2019-09-25 ENCOUNTER — TELEPHONE (OUTPATIENT)
Dept: HOME HEALTH SERVICES | Facility: HOSPITAL | Age: 59
End: 2019-09-25

## 2019-09-25 ENCOUNTER — PATIENT OUTREACH (OUTPATIENT)
Dept: ADMINISTRATIVE | Facility: OTHER | Age: 59
End: 2019-09-25

## 2019-09-26 ENCOUNTER — HOSPITAL ENCOUNTER (OUTPATIENT)
Dept: INTERVENTIONAL RADIOLOGY/VASCULAR | Facility: HOSPITAL | Age: 59
Discharge: HOME | End: 2019-09-26
Attending: FAMILY MEDICINE
Payer: COMMERCIAL

## 2019-09-26 ENCOUNTER — HOSPITAL ENCOUNTER (OUTPATIENT)
Facility: HOSPITAL | Age: 59
Discharge: HOME | End: 2019-09-26
Attending: RADIOLOGY | Admitting: RADIOLOGY
Payer: COMMERCIAL

## 2019-09-26 VITALS
DIASTOLIC BLOOD PRESSURE: 57 MMHG | RESPIRATION RATE: 18 BRPM | OXYGEN SATURATION: 96 % | HEART RATE: 68 BPM | TEMPERATURE: 98 F | SYSTOLIC BLOOD PRESSURE: 116 MMHG

## 2019-09-26 VITALS — SYSTOLIC BLOOD PRESSURE: 110 MMHG | DIASTOLIC BLOOD PRESSURE: 61 MMHG

## 2019-09-26 DIAGNOSIS — R18.8 OTHER ASCITES: ICD-10-CM

## 2019-09-26 PROCEDURE — 49083 ABD PARACENTESIS W/IMAGING: CPT

## 2019-09-26 PROCEDURE — A7048 VACUUM DRAIN BOTTLE/TUBE KIT: HCPCS

## 2019-09-26 PROCEDURE — P9047 ALBUMIN (HUMAN), 25%, 50ML: HCPCS | Mod: JG | Performed by: RADIOLOGY

## 2019-09-26 PROCEDURE — 49083 ABD PARACENTESIS W/IMAGING: CPT | Mod: ,,, | Performed by: RADIOLOGY

## 2019-09-26 PROCEDURE — 63600175 PHARM REV CODE 636 W HCPCS: Mod: JG | Performed by: RADIOLOGY

## 2019-09-26 PROCEDURE — 49083 IR PARACENTESIS NO IMAGING: ICD-10-PCS | Mod: ,,, | Performed by: RADIOLOGY

## 2019-09-26 RX ORDER — ALBUMIN HUMAN 250 G/1000ML
50 SOLUTION INTRAVENOUS ONCE AS NEEDED
Status: COMPLETED | OUTPATIENT
Start: 2019-09-26 | End: 2019-09-26

## 2019-09-26 RX ORDER — HYDROCODONE BITARTRATE AND ACETAMINOPHEN 5; 325 MG/1; MG/1
1 TABLET ORAL EVERY 4 HOURS PRN
Status: CANCELLED | OUTPATIENT
Start: 2019-09-26

## 2019-09-26 RX ADMIN — ALBUMIN (HUMAN) 50 G: 25 SOLUTION INTRAVENOUS at 10:09

## 2019-09-26 NOTE — H&P
[facility]  History & Physical - Short Stay  Interventional Radiology    SUBJECTIVE:     Chief Complaint/Reason for Admission: Ascities    Informant(s):  self    History of Present Illness:  Marvin Ray is a 59 y.o. male with a ascities history of refractory ascities.  Previous ultrasound showed Ascites.    Patient presents for paracentesis.    Scheduled Meds:   Continuous Infusions:   PRN Meds:     Review of patient's allergies indicates:   Allergen Reactions    Statins-hmg-coa reductase inhibitors      Generalized Pain    Onglyza [saxagliptin]     Penicillins Rash       Past Medical History:   Diagnosis Date    Arthritis     Cellulitis     CKD (chronic kidney disease), stage III     Coronary artery disease     Diabetes mellitus     Diabetic retinopathy     Diabetic ulcer of left foot     Glaucoma     Gout     Hyperlipemia     Hypertension     ICD (implantable cardioverter-defibrillator) in place 11/02/2018    Left chest    Non-pressure chronic ulcer of other part of left foot with fat layer exposed 10/23/2018    PVD (peripheral vascular disease)     Type 2 diabetes mellitus with left diabetic foot ulcer 10/29/2018    Unsteady gait     uses a wheelchair     Past Surgical History:   Procedure Laterality Date    AHMED GLAUCOMA IMPLANT Left 2011    DONE AT [facility]    AORTOGRAPHY WITH SERIALOGRAPHY Left 4/30/2019    Procedure: AORTOGRAM, WITH SERIALOGRAPHY, LEFT LOWER EXTREMITY, POSSIBLE INTERVENTION;  Surgeon: Mitch Chan MD;  Location: Health system OR;  Service: Vascular;  Laterality: Left;  RN PRE OP 4-23-19.  NO H & P, No Cosent  [place]    BAERVELDT GLAUCOMA IMPLANT Left 2012    WITH CATARACT EXTRACTION//DONE AT [facility]    CARDIAC CATHETERIZATION Left 05/2016    CARDIAC DEFIBRILLATOR PLACEMENT Left 11/02/2018    CATARACT EXTRACTION W/  INTRAOCULAR LENS IMPLANT Left 2012    WITH BAERVEDT//DONE AT [facility]    CATARACT EXTRACTION W/  INTRAOCULAR LENS IMPLANT Right  09/26/2018    COMPLEX ()    CB DESTRUCTION WITH CYCLO G6 Left 02/15/2017        CYST REMOVAL      DEBRIDEMENT OF FOOT  12/28/2018    Procedure: DEBRIDEMENT, FOOT;  Surgeon: Mitch Wall MD;  Location: [facility] OR;  Service: Vascular;;    DEBRIDEMENT OF FOOT  6/5/2019    Procedure: DEBRIDEMENT, FOOT;  Surgeon: Mitch Wall MD;  Location: [facility] OR;  Service: Vascular;;    EXAMINATION UNDER ANESTHESIA Left 12/5/2018    Procedure: Exam under anesthesia, left foot debridement, washout and all other indicated procedures;  Surgeon: Mitch Wall MD;  Location: [facility] OR;  Service: Vascular;  Laterality: Left;  1030AM START  RN PREOP 12/3/2018-----AICD  SEEN BY DR JAMES 12/4    EXAMINATION UNDER ANESTHESIA Left 2/13/2019    Procedure: LEFT FOOT WOUND DEBRIDEMENT, WASHOUT AND ALL OTHER INDICATED PROCEDURES;  Surgeon: Mitch Wall MD;  Location: [facility] OR;  Service: Vascular;  Laterality: Left;  requesting 1st case start  THIS CASE 1ST PER DR WALL ON 2/1/19 @ 844AM -  RN PREOP 2/6/2019  HAS CARDIAC CLEARANCE    EXAMINATION UNDER ANESTHESIA Left 12/28/2018    Procedure: Exam under anesthesia, left foot debridement, left foot washout, possible left second through fifth metatarsal resection;  Surgeon: Mitch Wall MD;  Location: [facility] OR;  Service: Vascular;  Laterality: Left;  1:00pm start per julissa @ 8:58am  RN  PHONE PRE OP 12-27-18--=Pt coming from \[facility]\"" on [facility]  NEED CONSENT    EXAMINATION UNDER ANESTHESIA Left 6/5/2019    Procedure: LEFT FOOT DEBRIDEMENT, WASHOUT AND ALL OTHER INDICATED PROCEDURES;  Surgeon: Mitch Wall MD;  Location: [facility] OR;  Service: Vascular;  Laterality: Left;  RN PRE OP 5-31-19------ICD------NEED CONSENT    INCISION AND DRAINAGE Left 11/14/2018    Procedure: Incision and Drainage, left lower extremity debridement, washout;  Surgeon: Mitch Wall MD;  Location: [facility] OR;  Service: Vascular;  Laterality: Left;     INCISION AND DRAINAGE Left 11/16/2018    Procedure: INCISION AND DRAINAGE;  Surgeon: Mitch Chan MD;  Location: [facility] OR;  Service: Vascular;  Laterality: Left;    INTRAOCULAR PROSTHESES INSERTION Right 9/26/2018    Procedure: INSERTION, IOL PROSTHESIS;  Surgeon: Perla Cortés MD;  Location: [facility] OR 1ST FLR;  Service: Ophthalmology;  Laterality: Right;    PERITONEOCENTESIS N/A 3/1/2019    Procedure: PARACENTESIS, ABDOMINAL, INITIAL;  Surgeon: Dosc Diagnostic Provider;  Location: [facility] OR;  Service: Radiology;  Laterality: N/A;    PERITONEOCENTESIS N/A 3/25/2019    Procedure: PARACENTESIS, ABDOMINAL, INITIAL;  Surgeon: Dosc Diagnostic Provider;  Location: [facility] OR;  Service: Radiology;  Laterality: N/A;    PERITONEOCENTESIS N/A 7/22/2019    Procedure: PARACENTESIS, ABDOMINAL, INITIAL;  Surgeon: Dosc Diagnostic Provider;  Location: [facility] OR;  Service: Radiology;  Laterality: N/A;    PERITONEOCENTESIS N/A 8/16/2019    Procedure: PARACENTESIS, ABDOMINAL, INITIAL;  Surgeon: Dosc Diagnostic Provider;  Location: [facility] OR;  Service: Radiology;  Laterality: N/A;    PHACOEMULSIFICATION OF CATARACT Right 9/26/2018    Procedure: PHACOEMULSIFICATION, CATARACT;  Surgeon: Perla Cortés MD;  Location: [facility] OR 1ST FLR;  Service: Ophthalmology;  Laterality: Right;    REPEAT ABDOMINAL PARACENTESIS N/A 2/11/2019    Procedure: PARACENTESIS, ABDOMINAL, REPEAT;  Surgeon: Dosc Diagnostic Provider;  Location: [facility] OR;  Service: Radiology;  Laterality: N/A;  1PM START    REPEAT ABDOMINAL PARACENTESIS N/A 9/12/2019    Procedure: PARACENTESIS, ABDOMINAL, REPEAT;  Surgeon: Dosc Diagnostic Provider;  Location: [facility] OR;  Service: Radiology;  Laterality: N/A;  9AM START    Right foot surgery  10/2014    TOE AMPUTATION Right     first and second    TOE AMPUTATION Left 11/16/2018    Procedure: AMPUTATION, TOES  2-5;  Surgeon: Mitch Chan MD;  Location: [facility] OR;  Service: Vascular;  Laterality: Left;    TOE  AMPUTATION Left 2018    Procedure: AMPUTATION, TOE;  Surgeon: Mitch Chan MD;  Location: [facility];  Service: Vascular;  Laterality: Left;  left great toe    TONSILLECTOMY       Family History   Problem Relation Age of Onset    Diabetes Mother     Heart disease Brother     No Known Problems Father     No Known Problems Sister     No Known Problems Maternal Aunt     No Known Problems Maternal Uncle     No Known Problems Paternal Aunt     No Known Problems Paternal Uncle     No Known Problems Maternal Grandmother     No Known Problems Maternal Grandfather     No Known Problems Paternal Grandmother     No Known Problems Paternal Grandfather     Anemia Neg Hx     Arrhythmia Neg Hx     Asthma Neg Hx     Clotting disorder Neg Hx     Fainting Neg Hx     Heart attack Neg Hx     Heart failure Neg Hx     Hyperlipidemia Neg Hx     Hypertension Neg Hx     Stroke Neg Hx     Atrial Septal Defect Neg Hx     Amblyopia Neg Hx     Blindness Neg Hx     Glaucoma Neg Hx     Macular degeneration Neg Hx     Retinal detachment Neg Hx     Strabismus Neg Hx      Social History     Tobacco Use    Smoking status: Former Smoker     Packs/day: 1.00     Years: 3.00     Pack years: 3.00     Types: Cigarettes     Last attempt to quit: 1984     Years since quittin.2    Smokeless tobacco: Never Used   Substance Use Topics    Alcohol use: Not Currently     Alcohol/week: 1.0 standard drinks     Types: 1 Cans of beer per week     Comment: rarely    Drug use: No        Review of Systems:  Constitutional: no fever or chills  Eyes: no visual changes  Respiratory: no cough or shortness of breath  Cardiovascular: no chest pain or palpitations  Gastrointestinal: no nausea or vomiting, no abdominal pain or change in bowel habits  Behavioral/Psych: no auditory or visual hallucinations    OBJECTIVE:     Vital Signs (Most Recent):       Physical Exam:      Laboratory  CBC:   Lab Results   Component Value  Date/Time    WBC 5.13 08/22/2019 08:20 AM    RBC 3.73 (L) 08/22/2019 08:20 AM    HGB 9.9 (L) 08/22/2019 08:20 AM    HCT 32.0 (L) 08/22/2019 08:20 AM     08/22/2019 08:20 AM    MCV 86 08/22/2019 08:20 AM    MCH 26.5 (L) 08/22/2019 08:20 AM    MCHC 30.9 (L) 08/22/2019 08:20 AM     BMP:   Lab Results   Component Value Date/Time    GLU 83 08/22/2019 08:20 AM    CO2 28 08/22/2019 08:20 AM    BUN 39 (H) 08/22/2019 08:20 AM    CREATININE 1.1 08/22/2019 08:20 AM    CALCIUM 8.8 08/22/2019 08:20 AM    MG 2.1 05/31/2019 10:25 AM     Coagulation:   Lab Results   Component Value Date/Time    INR 1.1 05/31/2019 10:25 AM    APTT 28.3 05/31/2019 10:25 AM       Imaging:  Ultrasound showed Ascites.    ASSESSMENT/PLAN:     Ascites.    Patient will undergo paracentesis.    Sedation/Anesthesia Assessment:    Sedation Plan: local anesthesia  Assessment:      Refractory ascities    Plan:    paracentesis

## 2019-09-26 NOTE — PROCEDURES
Marvin Ray is a 59 y.o. male patient.    BP: 110/61 (09/26/19 0945)     [facility]  Interventional Radiology    Procedure Performed by: Veronica    Procedure: paracentesis    Pre-Op Diagnosis: refractory ascities    Post-Op Diagnosis/Findings: same    Estimated Blood Loss: none    Specimen/Tissue Removed: Yes - 9.1 Liters    Date: 09/26/2019 Time: 10:02 AM  Procedures    Maciej Vogel  9/26/2019

## 2019-09-26 NOTE — DISCHARGE INSTRUCTIONS
BATHING:  ? You may shower tomorrow.  DRESSING:  ? Remove dressing tomorrow. Do not submerge your incision in water. No baths, swimming or hot tubs for 7 days.        ACTIVITY LEVEL: If you have received sedation or an anesthetic, you may feel sleepy for several hours. Rest until you are more awake. Gradually resume your normal activities    Do not drive, drink alcohol, or sign legal documents for 24 hours, or if taking narcotic pain medication.      DIET: You may resume your home diet. If nausea is present, increase your diet gradually with fluids and bland foods.    Medications: Pain medication should be taken only if needed and as directed. If antibiotics are prescribed, the medication should be taken until completed. You will be given an updated list of you medications.  ? No driving, alcoholic beverages or signing legal documents for next 24 hours if you have had sedation, or while taking pain medication    CALL THE DOCTOR:   For any obvious bleeding (some dried blood over the incision is normal).     Redness, swelling, foul smell around incision or fever over 101.  Shortness of breath.  Persistent pain or nausea not relieved by medication.  Call  [phone]     to speak with an Interventional Radiologist    If any unusual problems or difficulties occur contact your doctor. If you cannot contact your doctor but feel your signs and symptoms warrant a physicians attention return to the emergency room.       Fall Prevention  Millions of people fall every year and injure themselves. You may have had anesthesia or sedation which may increase your risk of falling. You may have health issues that put you at an increased risk of falling.     Here are ways to reduce your risk of falling.  ·   · Make your home safe by keeping walkways clear of objects you may trip over.  · Use non-slip pads under rugs. Do not use area rugs or small throw rugs.  · Use non-slip mats in bathtubs and showers.  · Install handrails and lights  on staircases.  · Do not walk in poorly lit areas.  · Do not stand on chairs or wobbly ladders.  · Use caution when reaching overhead or looking upward. This position can cause a loss of balance.  · Be sure your shoes fit properly, have non-slip bottoms and are in good condition.   · Wear shoes both inside and out. Avoid going barefoot or wearing slippers.  · Be cautious when going up and down stairs, curbs, and when walking on uneven sidewalks.  · If your balance is poor, consider using a cane or walker.  · If your fall was related to alcohol use, stop or limit alcohol intake.   · If your fall was related to use of sleeping medicines, talk to your doctor about this. You may need to reduce your dosage at bedtime if you awaken during the night to go to the bathroom.    · To reduce the need for nighttime bathroom trips:  ¨ Avoid drinking fluids for several hours before going to bed  ¨ Empty your bladder before going to bed  ¨ Men can keep a urinal at the bedside  · Stay as active as you can. Balance, flexibility, strength, and endurance all come from exercise. They all play a role in preventing falls. Ask your healthcare provider which types of activity are right for you.  · Get your vision checked on a regular basis.  · If you have pets, know where they are before you stand up or walk so you don't trip over them.  · Use night lights.

## 2019-09-27 RX ORDER — ALLOPURINOL 300 MG/1
300 TABLET ORAL DAILY
Qty: 30 TABLET | Refills: 3 | Status: SHIPPED | OUTPATIENT
Start: 2019-09-27 | End: 2020-02-23

## 2019-09-30 ENCOUNTER — OFFICE VISIT (OUTPATIENT)
Dept: VASCULAR SURGERY | Facility: CLINIC | Age: 59
End: 2019-09-30
Payer: COMMERCIAL

## 2019-09-30 VITALS
HEIGHT: 68 IN | WEIGHT: 201.75 LBS | BODY MASS INDEX: 30.58 KG/M2 | SYSTOLIC BLOOD PRESSURE: 94 MMHG | DIASTOLIC BLOOD PRESSURE: 52 MMHG | HEART RATE: 70 BPM

## 2019-09-30 DIAGNOSIS — I70.244 ATHEROSCLEROSIS OF NATIVE ARTERY OF LEFT LOWER EXTREMITY WITH ULCERATION OF MIDFOOT: Primary | ICD-10-CM

## 2019-09-30 PROCEDURE — 3074F SYST BP LT 130 MM HG: CPT | Mod: CPTII,S$GLB,, | Performed by: SURGERY

## 2019-09-30 PROCEDURE — 3074F PR MOST RECENT SYSTOLIC BLOOD PRESSURE < 130 MM HG: ICD-10-PCS | Mod: CPTII,S$GLB,, | Performed by: SURGERY

## 2019-09-30 PROCEDURE — 99215 OFFICE O/P EST HI 40 MIN: CPT | Mod: S$GLB,,, | Performed by: SURGERY

## 2019-09-30 PROCEDURE — 3078F PR MOST RECENT DIASTOLIC BLOOD PRESSURE < 80 MM HG: ICD-10-PCS | Mod: CPTII,S$GLB,, | Performed by: SURGERY

## 2019-09-30 PROCEDURE — 3078F DIAST BP <80 MM HG: CPT | Mod: CPTII,S$GLB,, | Performed by: SURGERY

## 2019-09-30 PROCEDURE — 3008F BODY MASS INDEX DOCD: CPT | Mod: CPTII,S$GLB,, | Performed by: SURGERY

## 2019-09-30 PROCEDURE — 99215 PR OFFICE/OUTPT VISIT, EST, LEVL V, 40-54 MIN: ICD-10-PCS | Mod: S$GLB,,, | Performed by: SURGERY

## 2019-09-30 PROCEDURE — 99999 PR PBB SHADOW E&M-EST. PATIENT-LVL III: CPT | Mod: PBBFAC,,, | Performed by: SURGERY

## 2019-09-30 PROCEDURE — 3008F PR BODY MASS INDEX (BMI) DOCUMENTED: ICD-10-PCS | Mod: CPTII,S$GLB,, | Performed by: SURGERY

## 2019-09-30 PROCEDURE — 99999 PR PBB SHADOW E&M-EST. PATIENT-LVL III: ICD-10-PCS | Mod: PBBFAC,,, | Performed by: SURGERY

## 2019-09-30 NOTE — PROGRESS NOTES
Mitch Chan MD [facility]                         09/30/2019    HPI:  Marvin Ray is a 59 y.o. male with   Patient Active Problem List   Diagnosis    Epiretinal membrane, both eyes    Nuclear sclerotic cataract of right eye    Pseudophakia, left eye    Ptosis, left eyelid    DM type 2 with diabetic peripheral neuropathy    HLD (hyperlipidemia)    Neovascular glaucoma of both eyes    Tophaceous gout    Essential hypertension    Coronary artery disease involving native coronary artery of native heart without angina pectoris    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 edema, with long-term current use of insulin    Neovascular glaucoma of left eye, severe stage    Statin myopathy    Neovascular glaucoma, right eye, mild stage    Left leg cellulitis    PVD (peripheral vascular disease)    Right wrist pain    Multiple open wounds of lower leg    Hepatosplenomegaly    Type 2 diabetes mellitus with left diabetic foot ulcer    Open wound of left foot    Cellulitis of left lower extremity    Diabetic foot infection    Other ascites    Severe malnutrition    Goals of care, counseling/discussion    Alteration in skin integrity    MDR Acinetobacter baumannii infection    Takes dietary supplements    Intertriginous dermatitis associated with moisture    Debility    Infection due to multidrug-resistant Pseudomonas aeruginosa    Subacute osteomyelitis of left foot    Ascites    Acute on chronic combined systolic and diastolic congestive heart failure    Stage 3 chronic kidney disease    Atherosclerosis of left lower extremity with ulceration of midfoot    CKD stage 3 due to type 2 diabetes mellitus    Azotemia    Hyponatremia    Anemia due to multiple mechanisms    Persistent proteinuria    Hypokalemia    Open wound of foot, left, subsequent encounter    being  managed by PCP and specialists who is here today for evaluation of L foot wound.  Seen in hospital last mo with LLE cellulitis.  Treated with Abx.  Also had paracentesis for ascites with negative w/u per family.  Patient states location is L foot occurring for 1-2 mo, worsening foot wound although improvement in edema and erythema.  Associated signs and symptoms include pain and edema.  Quality is aching and severity is 5/10.  Symptoms began 10/2018 spontaneously with blistering and severe edema.  Alleviating factors include wound care and elevation.  Worsening factors include pressure.    Tobacco use: denies    1/4/19: s/p LLE angioplasty and multiple subsequent OR and bedside debridements.  On IV Abx per culture results.  Glucose better controlled.  No fevers.  Receiving local wound care with Santyl.    1/14/19:  Cont to receive local wound care.  Pseudomonas in tissue and bone cultures multidrug resistant other than aminoglycoside; d/w Dr. Velez with high risk of ESRD with 6 weeks of aminoglycoside treatment.  No F/C.    1/31/19:  Pt without complaints.  Was started back on Bactrim.  Family doing wound care.    2/28/19: S/p L foot debridement 2/13/19.  Started on Meropenem.    3/28/19:  No complaints.  S/p paracentesis and pt feels better.    5/16/19:  S/p L peroneal and AT angioplasty, wound healing well.    Interval history:  S/p L foot debridement 6/5/19. WV placed.    Past Medical History:   Diagnosis Date    Arthritis     Cellulitis     CKD (chronic kidney disease), stage III     Coronary artery disease     Diabetes mellitus     Diabetic retinopathy     Diabetic ulcer of left foot     Glaucoma     Gout     Hyperlipemia     Hypertension     ICD (implantable cardioverter-defibrillator) in place 11/02/2018    Left chest    Non-pressure chronic ulcer of other part of left foot with fat layer exposed 10/23/2018    PVD (peripheral vascular disease)     Type 2 diabetes mellitus with left diabetic  foot ulcer 10/29/2018    Unsteady gait     uses a wheelchair     Past Surgical History:   Procedure Laterality Date    AHMED GLAUCOMA IMPLANT Left 2011    DONE AT [facility]    AORTOGRAPHY WITH SERIALOGRAPHY Left 4/30/2019    Procedure: AORTOGRAM, WITH SERIALOGRAPHY, LEFT LOWER EXTREMITY, POSSIBLE INTERVENTION;  Surgeon: Mitch Wall MD;  Location: [facility] OR;  Service: Vascular;  Laterality: Left;  RN PRE OP 4-23-19.  NO H & P, No Cosent  [place]    BAERVELDT GLAUCOMA IMPLANT Left 2012    WITH CATARACT EXTRACTION//DONE AT [facility]    CARDIAC CATHETERIZATION Left 05/2016    CARDIAC DEFIBRILLATOR PLACEMENT Left 11/02/2018    CATARACT EXTRACTION W/  INTRAOCULAR LENS IMPLANT Left 2012    WITH BAERVEDT//DONE AT [facility]    CATARACT EXTRACTION W/  INTRAOCULAR LENS IMPLANT Right 09/26/2018    COMPLEX ()    CB DESTRUCTION WITH CYCLO G6 Left 02/15/2017        CYST REMOVAL      DEBRIDEMENT OF FOOT  12/28/2018    Procedure: DEBRIDEMENT, FOOT;  Surgeon: Mitch Wall MD;  Location: [facility] OR;  Service: Vascular;;    DEBRIDEMENT OF FOOT  6/5/2019    Procedure: DEBRIDEMENT, FOOT;  Surgeon: Mitch Wall MD;  Location: [facility] OR;  Service: Vascular;;    EXAMINATION UNDER ANESTHESIA Left 12/5/2018    Procedure: Exam under anesthesia, left foot debridement, washout and all other indicated procedures;  Surgeon: Mitch Wall MD;  Location: [facility] OR;  Service: Vascular;  Laterality: Left;  1030AM START  RN PREOP 12/3/2018-----AICD  SEEN BY DR JAMES 12/4    EXAMINATION UNDER ANESTHESIA Left 2/13/2019    Procedure: LEFT FOOT WOUND DEBRIDEMENT, WASHOUT AND ALL OTHER INDICATED PROCEDURES;  Surgeon: Mitch Wall MD;  Location: [facility] OR;  Service: Vascular;  Laterality: Left;  requesting 1st case start  THIS CASE 1ST PER DR WALL ON 2/1/19 @ 844AM -LO  RN PREOP 2/6/2019  HAS CARDIAC CLEARANCE    EXAMINATION UNDER ANESTHESIA Left 12/28/2018    Procedure: Exam under anesthesia,  left foot debridement, left foot washout, possible left second through fifth metatarsal resection;  Surgeon: Mitch Chan MD;  Location: [facility] OR;  Service: Vascular;  Laterality: Left;  1:00pm start per julissa @ 8:58am  RN  PHONE PRE OP 12-27-18--=Pt coming from [facility] on [address]  NEED CONSENT    EXAMINATION UNDER ANESTHESIA Left 6/5/2019    Procedure: LEFT FOOT DEBRIDEMENT, WASHOUT AND ALL OTHER INDICATED PROCEDURES;  Surgeon: Mitch Chan MD;  Location: [facility] OR;  Service: Vascular;  Laterality: Left;  RN PRE OP 5-31-19------ICD------NEED CONSENT    INCISION AND DRAINAGE Left 11/14/2018    Procedure: Incision and Drainage, left lower extremity debridement, washout;  Surgeon: Mitch Chan MD;  Location: [facility] OR;  Service: Vascular;  Laterality: Left;    INCISION AND DRAINAGE Left 11/16/2018    Procedure: INCISION AND DRAINAGE;  Surgeon: Mitch Chan MD;  Location: [facility] OR;  Service: Vascular;  Laterality: Left;    INTRAOCULAR PROSTHESES INSERTION Right 9/26/2018    Procedure: INSERTION, IOL PROSTHESIS;  Surgeon: Perla Cortés MD;  Location: [address];  Service: Ophthalmology;  Laterality: Right;    PERITONEOCENTESIS N/A 3/1/2019    Procedure: PARACENTESIS, ABDOMINAL, INITIAL;  Surgeon: Dosc Diagnostic Provider;  Location: [facility] OR;  Service: Radiology;  Laterality: N/A;    PERITONEOCENTESIS N/A 3/25/2019    Procedure: PARACENTESIS, ABDOMINAL, INITIAL;  Surgeon: Dosc Diagnostic Provider;  Location: [facility] OR;  Service: Radiology;  Laterality: N/A;    PERITONEOCENTESIS N/A 7/22/2019    Procedure: PARACENTESIS, ABDOMINAL, INITIAL;  Surgeon: Dosc Diagnostic Provider;  Location: [facility] OR;  Service: Radiology;  Laterality: N/A;    PERITONEOCENTESIS N/A 8/16/2019    Procedure: PARACENTESIS, ABDOMINAL, INITIAL;  Surgeon: Dosc Diagnostic Provider;  Location: [facility] OR;  Service: Radiology;  Laterality: N/A;    PERITONEOCENTESIS N/A 9/26/2019    Procedure: PARACENTESIS,  ABDOMINAL;  Surgeon: [facility] Diagnostic Provider;  Location: [facility] OR;  Service: Radiology;  Laterality: N/A;    PHACOEMULSIFICATION OF CATARACT Right 9/26/2018    Procedure: PHACOEMULSIFICATION, CATARACT;  Surgeon: Perla Cortés MD;  Location: [address];  Service: Ophthalmology;  Laterality: Right;    REPEAT ABDOMINAL PARACENTESIS N/A 2/11/2019    Procedure: PARACENTESIS, ABDOMINAL, REPEAT;  Surgeon: [facility] Diagnostic Provider;  Location: [facility] OR;  Service: Radiology;  Laterality: N/A;  1PM START    REPEAT ABDOMINAL PARACENTESIS N/A 9/12/2019    Procedure: PARACENTESIS, ABDOMINAL, REPEAT;  Surgeon: [facility] Diagnostic Provider;  Location: [facility] OR;  Service: Radiology;  Laterality: N/A;  9AM START    Right foot surgery  10/2014    TOE AMPUTATION Right     first and second    TOE AMPUTATION Left 11/16/2018    Procedure: AMPUTATION, TOES  2-5;  Surgeon: Mitch Chan MD;  Location: [facility];  Service: Vascular;  Laterality: Left;    TOE AMPUTATION Left 12/5/2018    Procedure: AMPUTATION, TOE;  Surgeon: Mitch Chan MD;  Location: [facility];  Service: Vascular;  Laterality: Left;  left great toe    TONSILLECTOMY       Family History   Problem Relation Age of Onset    Diabetes Mother     Heart disease Brother     No Known Problems Father     No Known Problems Sister     No Known Problems Maternal Aunt     No Known Problems Maternal Uncle     No Known Problems Paternal Aunt     No Known Problems Paternal Uncle     No Known Problems Maternal Grandmother     No Known Problems Maternal Grandfather     No Known Problems Paternal Grandmother     No Known Problems Paternal Grandfather     Anemia Neg Hx     Arrhythmia Neg Hx     Asthma Neg Hx     Clotting disorder Neg Hx     Fainting Neg Hx     Heart attack Neg Hx     Heart failure Neg Hx     Hyperlipidemia Neg Hx     Hypertension Neg Hx     Stroke Neg Hx     Atrial Septal Defect Neg Hx     Amblyopia Neg Hx     Blindness Neg Hx      Glaucoma Neg Hx     Macular degeneration Neg Hx     Retinal detachment Neg Hx     Strabismus Neg Hx      Social History     Socioeconomic History    Marital status: Single     Spouse name: Not on file    Number of children: Not on file    Years of education: 14    Highest education level: Not on file   Occupational History    Not on file   Social Needs    Financial resource strain: Not on file    Food insecurity:     Worry: Not on file     Inability: Not on file    Transportation needs:     Medical: Not on file     Non-medical: Not on file   Tobacco Use    Smoking status: Former Smoker     Packs/day: 1.00     Years: 3.00     Pack years: 3.00     Types: Cigarettes     Last attempt to quit: 1984     Years since quittin.2    Smokeless tobacco: Never Used   Substance and Sexual Activity    Alcohol use: Not Currently     Alcohol/week: 1.0 standard drinks     Types: 1 Cans of beer per week     Comment: rarely    Drug use: No    Sexual activity: Not Currently     Partners: Female   Lifestyle    Physical activity:     Days per week: Not on file     Minutes per session: Not on file    Stress: Not on file   Relationships    Social connections:     Talks on phone: Not on file     Gets together: Not on file     Attends [employer] service: Not on file     Active member of club or organization: Not on file     Attends meetings of clubs or organizations: Not on file     Relationship status: Not on file   Other Topics Concern    Not on file   Social History Narrative    Not on file       Current Outpatient Medications:     allopurinol (ZYLOPRIM) 300 MG tablet, Take 1 tablet (300 mg total) by mouth once daily., Disp: 30 tablet, Rfl: 3    aspirin 325 MG tablet, Take 325 mg by mouth once daily. , Disp: , Rfl:     bisacodyl (DULCOLAX) 5 mg EC tablet, Take 5 mg by mouth daily as needed for Constipation., Disp: , Rfl:     brimonidine 0.2% (ALPHAGAN) 0.2 % Drop, Place 1 drop into both eyes 2 (two)  "times daily., Disp: 10 mL, Rfl: 11    carvedilol (COREG) 3.125 MG tablet, Take 1 tablet (3.125 mg total) by mouth 2 (two) times daily., Disp: 60 tablet, Rfl: 11    coenzyme Q10 100 mg capsule, Take 100 mg by mouth every morning., Disp: , Rfl:     dorzolamide-timolol 2-0.5% (COSOPT) 22.3-6.8 mg/mL ophthalmic solution, Place 1 drop into both eyes 2 (two) times daily., Disp: 1 Bottle, Rfl: 11    doxycycline (VIBRAMYCIN) 100 MG Cap, Take 1 capsule (100 mg total) by mouth every 12 (twelve) hours., Disp: 14 capsule, Rfl: 0    ezetimibe (ZETIA) 10 mg tablet, TAKE 1 TABLET(10 MG) BY MOUTH EVERY DAY, Disp: 90 tablet, Rfl: 0    fish oil-omega-3 fatty acids 300-1,000 mg capsule, Take 1 capsule by mouth 2 (two) times daily., Disp: 60 capsule, Rfl: 3    insulin aspart U-100 (NOVOLOG) 100 unit/mL InPn pen, Use on premeal readings: 150-200=+2, 201-250=+4; 251-300=+6; 301-350=+8, over 350=+10 units, Disp: 2.7 mL, Rfl: 11    insulin degludec-liraglutide (XULTOPHY 100/3.6) 100 unit-3.6 mg /mL (3 mL) InPn, Inject 42 Units into the skin once daily., Disp: 5 Syringe, Rfl: 5    MULTIVIT,THER IRON,CA,FA & MIN (MULTIVITAMIN) Tab, Take 1 tablet by mouth every morning. , Disp: , Rfl:     pen needle, diabetic 31 gauge x 1/4" Ndle, Use as directed, Disp: 100 each, Rfl: 11    torsemide (DEMADEX) 20 MG Tab, TAKE 4 TABLETS BY MOUTH TWICE DAILY, Disp: 120 tablet, Rfl: 0  No current facility-administered medications for this visit.     Facility-Administered Medications Ordered in Other Visits:     clindamycin 900 MG/50 ML D5W 900 mg/50 mL IVPB 900 mg, 900 mg, Intravenous, Q8H, Mitch Chan MD, 900 mg at 06/05/19 1407    lactated ringers infusion, , Intravenous, Continuous, Tam Reynolds MD    lidocaine (PF) 10 mg/ml (1%) injection 10 mg, 1 mL, Intradermal, Once, Tam Reynolds MD    REVIEW OF SYSTEMS:  General: No fevers or chills; ENT: No sore throat; Allergy and Immunology: no persistent infections; Hematological and " Lymphatic: No history of bleeding or easy bruising; Endocrine: negative; Respiratory: no cough, shortness of breath, or wheezing; Cardiovascular: no chest pain or dyspnea on exertion; Gastrointestinal: no abdominal pain/back, change in bowel habits, or bloody stools; Genito-Urinary: no dysuria, trouble voiding, or hematuria; Musculoskeletal: negative; Neurological: no TIA or stroke symptoms; Psychiatric: no nervousness, anxiety or depression.    PHYSICAL EXAM:      Pulse: 70         General appearance:  Alert, well-appearing, and in no distress.  Oriented to person, place, and time                    Neurological: Normal speech, no focal findings noted; CN II - XII grossly intact. RLE with sensation to light touch, LLE with sensation to light touch.            Musculoskeletal: Digits/nail without cyanosis/clubbing.  Strength 5/5 BLE.                    Neck: Supple, no significant adenopathy                  Chest:  Clear to auscultation, no wheezes, rales or rhonchi, symmetric air entry. No use of accessory muscles               Cardiac: Normal rate and regular rhythm, S1 and S2 normal            Abdomen: Soft, nontender, nondistended, no masses or organomegaly, no hernia     No rebound tenderness noted; bowel sounds normal     No groin adenopathy      Extremities:   2+ R femoral pulse, 2+ L femoral pulse     doppler+ R popliteal pulse, doppler+ L popliteal pulse     doppler+ R PT pulse, doppler+ L PT pulse     doppler+ R DP pulse, doppler+ L DP pulse     1+ RLE edema, 2+ LLE edema    Skin: LLE with minimal brawny edema, +large foot wound with markedly improved granulation tissue, no odor, no purulence or erythema    LAB RESULTS:  No results found for: CBC  Lab Results   Component Value Date    LABPROT 11.2 05/31/2019    INR 1.1 05/31/2019     Lab Results   Component Value Date     08/22/2019    K 3.6 08/22/2019     08/22/2019    CO2 28 08/22/2019    GLU 83 08/22/2019    BUN 39 (H) 08/22/2019     CREATININE 1.1 08/22/2019    CALCIUM 8.8 08/22/2019    ANIONGAP 8 08/22/2019    EGFRNONAA >60 08/22/2019     Lab Results   Component Value Date    WBC 5.13 08/22/2019    RBC 3.73 (L) 08/22/2019    HGB 9.9 (L) 08/22/2019    HCT 32.0 (L) 08/22/2019    MCV 86 08/22/2019    MCH 26.5 (L) 08/22/2019    MCHC 30.9 (L) 08/22/2019    RDW 19.1 (H) 08/22/2019     08/22/2019    MPV 8.7 (L) 08/22/2019    GRAN 3.6 08/22/2019    GRAN 69.8 08/22/2019    LYMPH 0.5 (L) 08/22/2019    LYMPH 10.1 (L) 08/22/2019    MONO 0.7 08/22/2019    MONO 13.1 08/22/2019    EOS 0.3 08/22/2019    BASO 0.05 08/22/2019    EOSINOPHIL 6.0 08/22/2019    BASOPHIL 1.0 08/22/2019    DIFFMETHOD Automated 08/22/2019     .  Lab Results   Component Value Date    HGBA1C 6.0 (H) 07/22/2019       IMAGING:  All pertinent imaging has been reviewed and interpreted independently.    BLE arterial US 1/2019: No focal stenosis    5/16/19: BLE with no HD significant stenosis, SIDRA 0.8/1.46, left ankle pressures 60 and 171    7/29/19: BLE arterial US with approx 50% R TPT stenosis, SIDRA 0.8; LLE showing no HD significant stenosis, SIDRA 0.66    IMP/PLAN:  59 y.o. male with   Patient Active Problem List   Diagnosis    Epiretinal membrane, both eyes    Nuclear sclerotic cataract of right eye    Pseudophakia, left eye    Ptosis, left eyelid    DM type 2 with diabetic peripheral neuropathy    HLD (hyperlipidemia)    Neovascular glaucoma of both eyes    Tophaceous gout    Essential hypertension    Coronary artery disease involving native coronary artery of native heart without angina pectoris    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 edema, with long-term current use of insulin    Neovascular glaucoma of left eye, severe stage    Statin myopathy    Neovascular glaucoma, right eye, mild stage    Left leg cellulitis    PVD (peripheral vascular disease)    Right wrist pain    Multiple open wounds of  lower leg    Hepatosplenomegaly    Type 2 diabetes mellitus with left diabetic foot ulcer    Open wound of left foot    Cellulitis of left lower extremity    Diabetic foot infection    Other ascites    Severe malnutrition    Goals of care, counseling/discussion    Alteration in skin integrity    MDR Acinetobacter baumannii infection    Takes dietary supplements    Intertriginous dermatitis associated with moisture    Debility    Infection due to multidrug-resistant Pseudomonas aeruginosa    Subacute osteomyelitis of left foot    Ascites    Acute on chronic combined systolic and diastolic congestive heart failure    Stage 3 chronic kidney disease    Atherosclerosis of left lower extremity with ulceration of midfoot    CKD stage 3 due to type 2 diabetes mellitus    Azotemia    Hyponatremia    Anemia due to multiple mechanisms    Persistent proteinuria    Hypokalemia    Open wound of foot, left, subsequent encounter    being managed by PCP and specialists who is here today for evaluation of L foot wound.    -L foot wound improved s/p debridement 2/13/19, multifactorial due to diabetes, PVD and venous insufficiency with improvement in granulation tissue on Abx due to multidrug resistent bacteria in the past s/p debridements and L tibial angioplasty x 2 with improvement in wound +granulation tissue s/p debridement 6/5/19 and WV placement; eval by Plastic Surgery re: grafting / skin grafting vs continuing wound care and allowing to heal by secondary intention - pt desires to cont wound care  -Cont ID rec Abx regimen  -Recommend continued wound care center care   -Rec BLE Xeroform and dry kerlix wraps twice daily to shin regions with elevation of LLE above level of the heart  -Low sodium diet  -Strict glycemic control  -RTC 4 weeks for continued evaluation and arterial studies    I spent 20 minutes evaluating this patient and greater than 50% of the time was spent counseling, coordinator care and  discussing the plan of care.  All questions were answered and patient stated understanding with agreement with the above treatment plan.    Mitch Chan MD RPVI  Vascular and Endovascular Surgery

## 2019-09-30 NOTE — PATIENT INSTRUCTIONS
Understanding Peripheral Arterial Disease    Peripheral arteries deliver oxygen-rich blood to the tissues outside the heart. As you age, your arteries become stiffer and thicker. In addition, risk factors, such as smoking and high cholesterol, can damage the artery lining. This allows a buildup of fat and other materials (plaque) to form within the artery walls. The buildup of plaque narrows the space inside the artery and sometimes blocks blood flow. Peripheral arterial disease (PAD) happens when blood flow through the arteries is reduced because of plaque buildup. It often happens in the legs and feet, but can also happen elsewhere in the body. If this buildup happens in the a large artery in the neck (carotid artery), it can be a major contributor to stroke.  A healthy artery  An artery is a muscular tube that carries oxgen rich blood and nutrients from the heart to the rest of the body. It has a smooth lining and flexible walls that allow blood to pass freely. When active, muscles need more oxygen. This increases blood flow. Healthy arteries can adapt to meet this need.  A damaged artery    PAD begins when the lining of an artery is damaged. This is often because of risk factors, such as smoking, older age, or diabetes. Plaque then starts to form within the artery wall. At this stage, blood flows normally, so youre not likely to have symptoms.  A narrowed artery    If plaque continues to build up, the space inside the artery narrows. The artery walls become less able to expand. The artery still provides enough blood and oxygen to your muscles during rest. But when youre active, the increased demand for blood cant be met. As a result, your leg may cramp or ache when you walk.  A blocked artery    An artery can become blocked by plaque or by a blood clot lodged in a narrowed section. When this happens, oxygen cant reach the muscle below the blockage. Then you may feel pain when lying down or when you are not  active (rest pain). This type of pain is especially common at night when youre lying flat. In time, the affected tissue can die. This can lead to the loss of a toe or foot.  Date Last Reviewed: 5/1/2016 © 2000-2017 [employer]. [address] [place] [address]. All rights reserved. This information is not intended as a substitute for professional medical care. Always follow your healthcare professional's instructions.        Peripheral Artery Disease (PAD)     Peripheral artery disease (PAD) occurs when the arteries that carry blood to the limbs are narrowed or blocked. This is usually due to a buildup of a fatty substance called plaque in the walls of the arteries.  PAD most often affects the arteries in the legs. When these arteries are narrowed or blocked, blood flow to the legs is reduced. This can cause leg and foot pain and other symptoms. If severe enough, reduced blood flow to the legs can lead to tissue death (gangrene) and the loss of a toe, foot, or leg. Having PAD also makes it more likely that arteries in other body areas are blocked. For instance, arteries that carry blood to the heart or brain may be affected. This raises the chances of heart attack and stroke.  Risk factors  Certain factors can make PAD more likely. They include:  · Smoking  · Diabetes  · High blood pressure  · Unhealthy cholesterol levels  · Obesity  · Inactive lifestyle  · Older age  · Family history of PAD  Symptoms  Many people with PAD have no symptoms. If symptoms do occur, they can include:  · Pain in the muscles of the calves, thighs or hips that gets worse with activity and better with rest (intermittent claudication)  · Achy, tired, or heavy feeling in the legs  · Weakness, numbness, tingling, or loss of feeling in the legs  · Changes in skin color of the legs  · Sores on the legs and feet  · Cold leg, feet, or toes  · Pain the feet or toes even when lying down (rest pain)  Home care  PAD is a  chronic (lifelong) condition. Treatment is focused on managing your condition and lowering your health risks. This may include doing the following:  · If you smoke, quit. This helps prevent further damage to your arteries and lowers your health risks. Ask your provider about medicines or products that can help you quit smoking. Also consider joining a stop-smoking program or support group.  · Be more active. This helps you lose weight and manage problems such as high blood pressure and unhealthy cholesterol levels. Start a walking program if advised to by your provider. Your provider may also help you form a safe exercise program that is right for your needs.  · Make healthy eating changes. This includes eating less fat, salt, and sugar.  · Take medicines for high blood pressure, unhealthy cholesterol levels, and diabetes as directed.  · Have your blood pressure and cholesterol levels checked as often as directed.  · If you have diabetes, try to keep your blood sugar well controlled. Test your blood sugar as directed.  · If you are overweight, talk to your provider about forming a weight-loss plan.  · Watch for cuts, scrapes, or open sores on your feet. Poor blood flow to the feet may slow healing and increase the risk of infection from these problems.   Follow-up care  Follow up with your healthcare provider, or as advised. If imaging tests such as ultrasound were done, they will be reviewed by a doctor. You will be told the results and any new findings that may affect your care.  When to seek medical advice   Call your healthcare provider right away if any of these occur:  · Sudden severe pain in the legs or feet  · Sudden cold, pale or blue color in the legs or feet  · Weakness or numbness in the legs or feet that worsens  · Any sore or wound in the legs or feet that wont heal  · Weak pulse in your legs or feet  Know the Signs of Heart Attack and Stroke  People with PAD are at high risk for heart attack and  stroke. Knowing the signs of these problems can help you protect your health and get help when you need it. Call 911 right away if you have any of the following:  Signs of a Heart Attack  · Chest discomfort, such as pain, aching, tightness, or pressure that lasts more than a few minutes, or that comes and goes  · Pain or discomfort in the arms, back, shoulders, neck, or jaw  · Shortness of breath  · Sweating (often a cold, clammy sweat)  · Nausea  · Lightheadedness  Signs of a Stroke  · Sudden numbness or weakness of the face, arms, or legs, especially on one side  · Sudden confusion or trouble speaking or understanding  · Sudden trouble seeing in one or both eyes  · Sudden trouble walking, dizziness, or loss of balance  · Sudden, severe headache with no known cause   Date Last Reviewed: 9/21/2015  © 5566-9045 [employer]. [address] [place] [address]. All rights reserved. This information is not intended as a substitute for professional medical care. Always follow your healthcare professional's instructions.        A Walking Program for Peripheral Arterial Disease (PAD)  Peripheral arterial disease (PAD) is a condition where the arteries in the legs are severely damaged. When you have PAD, walking can be painful. So you may start to walk less. Walking less makes your leg muscles weaker. It then becomes harder and more painful to walk. A walking program can break this cycle. This sheet gives you tips on how to get started.     Walking farther without pain  Exercise strengthens leg muscles that are out of shape. It also trains these muscles to work with less oxygen. This helps your leg muscles work better even with reduced blood flow to your legs. When you have PAD, a walking program can be helpful. Your program can:  · Let you walk longer and farther without claudication. This is an ache in your legs during exercise that goes away with rest.  · Let you do more and be more active  · Add to  your overall health and well-being  · Help you control your blood sugar and blood pressure  · Help you become healthier with no risk and at little or no cost  Getting started  Your local hospital, vascular center, or cardiac rehab center may have a special walking program for people with PAD. If so, this is your best option. But if you cant find a program, or its not covered by insurance, you can still walk on your own. Follow these steps at each session:  · Step 1. Start at a pace that lets you walk for 5 to 10 minutes before you start to feel claudication. This feeling is unpleasant, but it doesnt hurt you. Keep going until the pain makes you feel that you need to stop.  · Step 2. Stop and rest for 3 to 5 minutes, just long enough for the pain to go away. You can rest standing or sitting. (Some people like to bring along a cane or a lightweight folding chair.)  · Step 3. Again walk at a pace that lets you walk for 5 to 10 minutes more before you feel pain. This may be slower than your starting pace in step 1. Then rest again.  · Step 4. Repeat this process until youve walked for 45 minutes. This should be about 60 to 80 minutes total, including resting time. You may not be able to do a full 45 minutes at first. Do as much as you can, and increase your time as you improve.  Making the most of your program  · Walk at least 3 times a week. Take no more than 2 days off between sessions. If you stop walking, even for a week or two, you can lose the health benefits of your program.  · Find a good place to walk. A treadmill or a track may be better at first. That way, you wont run the risk of going too far and finding that you cant walk back. Be sure to have a place to walk indoors in bad weather, such as a gym or a mall.  · Wear shoes with sturdy, flexible soles. The heel should fit without slipping. You should have room to wiggle your toes.  · Keep track of how long you walk. A pedometer will show your daily  progress. It can also show how much farther you can walk as time goes by.  · Ask a friend to keep you company. You may enjoy walking with someone else. Or you may want to make your walking sessions a time to relax by yourself.  · Make it fun. Listen to music while you walk and rest. Walk in a favorite park. Get to know the people at the gym, or the folks that you pass on your route. While you rest, you can window-shop, read, or feed the birds. Do whatever will help you enjoy your exercise sessions.  Date Last Reviewed: 6/1/2016  © 8616-6211 [employer]. [address] [place] [address], [place] [address]. All rights reserved. This information is not intended as a substitute for professional medical care. Always follow your healthcare professional's instructions.

## 2019-09-30 NOTE — LETTER
September 30, 2019        Rubén Davis MD  [address] [facility] [address]             [facility]  [address]  [facility] [address]  Phone: [phone]  Fax: [phone]   Patient: Marvin Ray   MR Number: [id number]   YOB: 1960   Date of Visit: 9/30/2019       Dear Dr. Davis:    Thank you for referring Marvin Ray to me for evaluation. Below are the relevant portions of my assessment and plan of care.            If you have questions, please do not hesitate to call me. I look forward to following Marvin along with you.    Sincerely,      Mitch Chan MD           CC  No Recipients

## 2019-10-01 ENCOUNTER — TELEPHONE (OUTPATIENT)
Dept: OPTOMETRY | Facility: CLINIC | Age: 59
End: 2019-10-01

## 2019-10-01 ENCOUNTER — HOSPITAL ENCOUNTER (OUTPATIENT)
Dept: WOUND CARE | Facility: HOSPITAL | Age: 59
Discharge: HOME | End: 2019-10-01
Attending: FAMILY MEDICINE
Payer: COMMERCIAL

## 2019-10-01 DIAGNOSIS — L97.529 TYPE 2 DIABETES MELLITUS WITH LEFT DIABETIC FOOT ULCER: Primary | ICD-10-CM

## 2019-10-01 DIAGNOSIS — E11.621 TYPE 2 DIABETES MELLITUS WITH LEFT DIABETIC FOOT ULCER: Primary | ICD-10-CM

## 2019-10-01 PROCEDURE — 99215 OFFICE O/P EST HI 40 MIN: CPT | Performed by: FAMILY MEDICINE

## 2019-10-01 PROCEDURE — 99214 OFFICE O/P EST MOD 30 MIN: CPT | Mod: ,,, | Performed by: FAMILY MEDICINE

## 2019-10-01 PROCEDURE — 99214 PR OFFICE/OUTPT VISIT, EST, LEVL IV, 30-39 MIN: ICD-10-PCS | Mod: ,,, | Performed by: FAMILY MEDICINE

## 2019-10-01 NOTE — PRE-PROCEDURE INSTRUCTIONS
PREOP INSTRUCTIONS:No solid food ,milk or milk products for 8 hours prior to procedure.Clear liquids are allowed up to 2 hours before arrival time.Clear liquids are:water,apple juice,pedialyte,gatorade,& jello.Shower instructions as well as directions to the Surgery Center were given.Patient encouraged to wear loose fitting,comfortable clothing that preferably buttons up the front.Medication instructions for pm prior to and am of procedure reviewed.Patient stated an understanding.    Patient denies any side effects or issues with anesthesia or sedation.

## 2019-10-01 NOTE — PROGRESS NOTES
HPI     Post-op Evaluation      Additional comments: Pt c/o itchy OS but denies any pain              Comments     S/p Revision of Extruding Baerveldt OS 10/2/19  (with pericardial patch graft)    MEDS:  Vigamox QID OS  Cosopt BID OU  Brimonidine BID OU          Last edited by Leela Chamorro MA on 10/3/2019  9:32 AM. (History)              Assessment /Plan     For exam results, see Encounter Report.    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 POAG od   S/P ahmed os 2011  S/P phaco/IOL / baerveldt os 2012       1. NVG OS 2/2 PDR // ?? POAG od - on gtts    First HVF   ? 10/2014    First photos   6/2016   Treatment / Drops started   Years ago           Family history    ?        Glaucoma meds    cosopt ou / alphagan ou         H/O adverse rxn to glaucoma drops    ? Try and avoid PG's - PDR with ME and NVG os         LASERS    SLT - ? Os or OU // G6 laser os // PRP ou         GLAUCOMA SURGERIES    ahmed os- 2011 - [facility] - ? [place]  // baerveldt os - 2012 - ? [place]  - both at [facility] /   / G6 laser os 2/15/2017        OTHER EYE SURGERIES    Phaco/IOL od 9/26/2018         CDR    0.4/0.9        Tbase    ??  (on gtts at [facility] from 2014 to 2016 ran - 12-19 od // 16-25 os )         Tmax    ?             Ttarget    ?             HVF    4 test 2014 to  2015 - [facility] 2 test 2016 to 2019  -24-2 ss  SAD/IAD od // 24-2 stim V gen dep - SAD / IAD  os         Gonio    +3 od // +3 sup os with PAS T/N/I         CCT    449/540        OCT    2 test 2016 to 2018 - RNFL - dec S/I od  od // dec S/I, bord N  os        HRT    2 test 2017 to 2018 - MR - nl  od // dec TI/ bord NI  os        Disc photos    2016     - Ttoday   ?? / 34  - Test done today    F/U revision of GDD tube errosion OS of the infero-nasal tube  - 10/3/2019      - OFF -  Xalatan OD due to macular edema  - pt reports good adherence  Cont  cosopt bid ou, brimonidine bid ou        2.  PDR s/p PRP OU (DM2), with CSME OU (and HTN)  - Encouraged good BS/BP/Cholesterol control    3. DME OU  OD>OS  S/p Avastin OD x 9, OS x 7  Will monitor OS for now given NVG and ERM      4. PSK OS  - Stable, CTM, RD precautions       5. ERM OS  Given NVG hx, monitor  - CTM    6. PC IOL OU    OD 9/26/2018 - pcb00 17.0   Os       Cont cosopt ou tid   Cont alphagan tid ou   Stay off  latanoprost - - 2/2 DME od     H/O  RUBEOSIS AT THE PUPIL OD AND 1 SMALL AREA OF ANGLE VIRI ING OD (post CE)    S/P more avastin od 10/9/2018 - [facility] - rubeosis regressed    S/p fill in PRP od - 11/26/2018 - [facility] - rubeosis regressed    S/p MORE AVASTIN OD 1/25/2019     VA good - BCVA - 20/40 - 11/30/2018    If IOP goes up OS  can re-start PG's os and / or repeat cyclo G6    5/24/2019 - pt has been very ill   + CHF   + PVD - had toes amputated left foot   -S/P angioplasty to open leg vessels   Out of hospital and at home - now - wound care continues     GDD - tube errosion of the IN tube OS - // revision // tube no longer functions - 10/2/2019     POD # 1  anterior chamber depth  Deep   Bleb appearance  None  Patch graft in place IN quadrant   sidell neg  IOP  34 (no gtts today )     Meds: - operated eye  Pred acetate tid  vigamox tid  Atropine 1 x day     cosopt tid ou   Brimonidine tid ou     Shield at night   Glasses day     F/U  1 week // if IOP remains up can try PG or rhopressa / or more G6 laser      I have seen and personally  examined the patient.  I agree with the findings, assessment and plan of the resident and/or fellow.     Perla Cortés MD

## 2019-10-02 ENCOUNTER — DOCUMENTATION ONLY (OUTPATIENT)
Dept: ELECTROPHYSIOLOGY | Facility: CLINIC | Age: 59
End: 2019-10-02

## 2019-10-02 ENCOUNTER — ANESTHESIA EVENT (OUTPATIENT)
Dept: SURGERY | Facility: HOSPITAL | Age: 59
End: 2019-10-02
Payer: COMMERCIAL

## 2019-10-02 ENCOUNTER — ANESTHESIA (OUTPATIENT)
Dept: SURGERY | Facility: HOSPITAL | Age: 59
End: 2019-10-02
Payer: COMMERCIAL

## 2019-10-02 ENCOUNTER — TELEPHONE (OUTPATIENT)
Dept: HOME HEALTH SERVICES | Facility: HOSPITAL | Age: 59
End: 2019-10-02

## 2019-10-02 ENCOUNTER — HOSPITAL ENCOUNTER (OUTPATIENT)
Facility: HOSPITAL | Age: 59
Discharge: HOME | End: 2019-10-02
Attending: OPHTHALMOLOGY | Admitting: OPHTHALMOLOGY
Payer: COMMERCIAL

## 2019-10-02 VITALS
WEIGHT: 201 LBS | DIASTOLIC BLOOD PRESSURE: 59 MMHG | RESPIRATION RATE: 18 BRPM | HEART RATE: 69 BPM | TEMPERATURE: 98 F | SYSTOLIC BLOOD PRESSURE: 112 MMHG | BODY MASS INDEX: 28.77 KG/M2 | HEIGHT: 70 IN | OXYGEN SATURATION: 96 %

## 2019-10-02 DIAGNOSIS — H40.52X3 NEOVASCULAR GLAUCOMA OF LEFT EYE, SEVERE STAGE: ICD-10-CM

## 2019-10-02 DIAGNOSIS — T85.9XXA DISORDER DUE TO INSERTION OF GLAUCOMA DRAINAGE DEVICE: Primary | ICD-10-CM

## 2019-10-02 LAB
POCT GLUCOSE: 140 MG/DL (ref 70–110)
POCT GLUCOSE: 155 MG/DL (ref 70–110)

## 2019-10-02 PROCEDURE — D9220A PRA ANESTHESIA: Mod: ANES,,, | Performed by: ANESTHESIOLOGY

## 2019-10-02 PROCEDURE — 36000706: Performed by: OPHTHALMOLOGY

## 2019-10-02 PROCEDURE — 37000008 HC ANESTHESIA 1ST 15 MINUTES: Performed by: OPHTHALMOLOGY

## 2019-10-02 PROCEDURE — 25000003 PHARM REV CODE 250: Performed by: OPHTHALMOLOGY

## 2019-10-02 PROCEDURE — 37000009 HC ANESTHESIA EA ADD 15 MINS: Performed by: OPHTHALMOLOGY

## 2019-10-02 PROCEDURE — 66185 PR REVISE EYE SHUNT: ICD-10-PCS | Mod: LT,,, | Performed by: OPHTHALMOLOGY

## 2019-10-02 PROCEDURE — 71000015 HC POSTOP RECOV 1ST HR: Performed by: OPHTHALMOLOGY

## 2019-10-02 PROCEDURE — D9220A PRA ANESTHESIA: ICD-10-PCS | Mod: CRNA,,, | Performed by: NURSE ANESTHETIST, CERTIFIED REGISTERED

## 2019-10-02 PROCEDURE — 63600175 PHARM REV CODE 636 W HCPCS: Performed by: OPHTHALMOLOGY

## 2019-10-02 PROCEDURE — 36000707: Performed by: OPHTHALMOLOGY

## 2019-10-02 PROCEDURE — 71000044 HC DOSC ROUTINE RECOVERY FIRST HOUR: Performed by: OPHTHALMOLOGY

## 2019-10-02 PROCEDURE — 63600175 PHARM REV CODE 636 W HCPCS: Performed by: NURSE ANESTHETIST, CERTIFIED REGISTERED

## 2019-10-02 PROCEDURE — 27800903 OPTIME MED/SURG SUP & DEVICES OTHER IMPLANTS: Performed by: OPHTHALMOLOGY

## 2019-10-02 PROCEDURE — D9220A PRA ANESTHESIA: Mod: CRNA,,, | Performed by: NURSE ANESTHETIST, CERTIFIED REGISTERED

## 2019-10-02 PROCEDURE — 82962 GLUCOSE BLOOD TEST: CPT | Performed by: OPHTHALMOLOGY

## 2019-10-02 PROCEDURE — 66185 REVISE AQUEOUS SHUNT EYE: CPT | Mod: LT,,, | Performed by: OPHTHALMOLOGY

## 2019-10-02 PROCEDURE — D9220A PRA ANESTHESIA: ICD-10-PCS | Mod: ANES,,, | Performed by: ANESTHESIOLOGY

## 2019-10-02 PROCEDURE — 25000003 PHARM REV CODE 250

## 2019-10-02 DEVICE — PERICARDIUM TUTOPLAST 1.5X1.5: Type: IMPLANTABLE DEVICE | Site: EYE | Status: FUNCTIONAL

## 2019-10-02 RX ORDER — LIDOCAINE HYDROCHLORIDE 10 MG/ML
1 INJECTION, SOLUTION EPIDURAL; INFILTRATION; INTRACAUDAL; PERINEURAL ONCE
Status: COMPLETED | OUTPATIENT
Start: 2019-10-02 | End: 2019-10-02

## 2019-10-02 RX ORDER — SODIUM CHLORIDE 9 MG/ML
INJECTION, SOLUTION INTRAVENOUS CONTINUOUS
Status: DISCONTINUED | OUTPATIENT
Start: 2019-10-02 | End: 2019-10-02 | Stop reason: HOSPADM

## 2019-10-02 RX ORDER — GENTAMICIN SULFATE 40 MG/ML
INJECTION, SOLUTION INTRAMUSCULAR; INTRAVENOUS
Status: DISCONTINUED
Start: 2019-10-02 | End: 2019-10-02 | Stop reason: HOSPADM

## 2019-10-02 RX ORDER — LIDOCAINE HYDROCHLORIDE 10 MG/ML
INJECTION, SOLUTION EPIDURAL; INFILTRATION; INTRACAUDAL; PERINEURAL
Status: DISCONTINUED
Start: 2019-10-02 | End: 2019-10-02 | Stop reason: HOSPADM

## 2019-10-02 RX ORDER — LIDOCAINE HYDROCHLORIDE 10 MG/ML
INJECTION, SOLUTION EPIDURAL; INFILTRATION; INTRACAUDAL; PERINEURAL
Status: DISCONTINUED | OUTPATIENT
Start: 2019-10-02 | End: 2019-10-02 | Stop reason: HOSPADM

## 2019-10-02 RX ORDER — ACETAMINOPHEN 325 MG/1
650 TABLET ORAL EVERY 6 HOURS PRN
Status: DISCONTINUED | OUTPATIENT
Start: 2019-10-02 | End: 2019-10-02 | Stop reason: HOSPADM

## 2019-10-02 RX ORDER — LIDOCAINE HYDROCHLORIDE 40 MG/ML
INJECTION, SOLUTION RETROBULBAR
Status: DISCONTINUED
Start: 2019-10-02 | End: 2019-10-02 | Stop reason: HOSPADM

## 2019-10-02 RX ORDER — MIDAZOLAM HYDROCHLORIDE 1 MG/ML
INJECTION, SOLUTION INTRAMUSCULAR; INTRAVENOUS
Status: DISCONTINUED | OUTPATIENT
Start: 2019-10-02 | End: 2019-10-02

## 2019-10-02 RX ORDER — HYDROMORPHONE HYDROCHLORIDE 1 MG/ML
0.2 INJECTION, SOLUTION INTRAMUSCULAR; INTRAVENOUS; SUBCUTANEOUS EVERY 5 MIN PRN
Status: DISCONTINUED | OUTPATIENT
Start: 2019-10-02 | End: 2019-10-02 | Stop reason: HOSPADM

## 2019-10-02 RX ORDER — SODIUM CHLORIDE 0.9 % (FLUSH) 0.9 %
3 SYRINGE (ML) INJECTION
Status: DISCONTINUED | OUTPATIENT
Start: 2019-10-02 | End: 2019-10-02 | Stop reason: HOSPADM

## 2019-10-02 RX ORDER — ATROPINE SULFATE 10 MG/ML
SOLUTION/ DROPS OPHTHALMIC
Status: DISCONTINUED
Start: 2019-10-02 | End: 2019-10-02 | Stop reason: HOSPADM

## 2019-10-02 RX ORDER — MOXIFLOXACIN 5 MG/ML
1 SOLUTION/ DROPS OPHTHALMIC
Status: DISCONTINUED | OUTPATIENT
Start: 2019-10-02 | End: 2019-10-02 | Stop reason: HOSPADM

## 2019-10-02 RX ORDER — PREDNISOLONE ACETATE 10 MG/ML
SUSPENSION/ DROPS OPHTHALMIC
Status: DISCONTINUED
Start: 2019-10-02 | End: 2019-10-02 | Stop reason: HOSPADM

## 2019-10-02 RX ORDER — GENTAMICIN SULFATE 40 MG/ML
INJECTION, SOLUTION INTRAMUSCULAR; INTRAVENOUS
Status: DISCONTINUED | OUTPATIENT
Start: 2019-10-02 | End: 2019-10-02 | Stop reason: HOSPADM

## 2019-10-02 RX ORDER — LIDOCAINE HYDROCHLORIDE 40 MG/ML
INJECTION, SOLUTION RETROBULBAR
Status: DISCONTINUED | OUTPATIENT
Start: 2019-10-02 | End: 2019-10-02 | Stop reason: HOSPADM

## 2019-10-02 RX ORDER — PREDNISOLONE ACETATE 10 MG/ML
SUSPENSION/ DROPS OPHTHALMIC
Status: DISCONTINUED | OUTPATIENT
Start: 2019-10-02 | End: 2019-10-02 | Stop reason: HOSPADM

## 2019-10-02 RX ORDER — HYDROCODONE BITARTRATE AND ACETAMINOPHEN 5; 325 MG/1; MG/1
1 TABLET ORAL EVERY 4 HOURS PRN
Status: DISCONTINUED | OUTPATIENT
Start: 2019-10-02 | End: 2019-10-02 | Stop reason: HOSPADM

## 2019-10-02 RX ADMIN — MIDAZOLAM HYDROCHLORIDE 1 MG: 1 INJECTION, SOLUTION INTRAMUSCULAR; INTRAVENOUS at 11:10

## 2019-10-02 RX ADMIN — MOXIFLOXACIN HYDROCHLORIDE 1 DROP: 5 SOLUTION/ DROPS OPHTHALMIC at 09:10

## 2019-10-02 RX ADMIN — LIDOCAINE HYDROCHLORIDE 1 MG: 10 INJECTION, SOLUTION EPIDURAL; INFILTRATION; INTRACAUDAL; PERINEURAL at 09:10

## 2019-10-02 RX ADMIN — SODIUM CHLORIDE: 0.9 INJECTION, SOLUTION INTRAVENOUS at 09:10

## 2019-10-02 NOTE — OP NOTE
DATE OF PROCEDURE: 10/02/2019    PREOPERATIVE DIAGNOSIS: extruding glaucoma implant  (bearveldt) - inferior - nasal quadrant left eye     POSTOPERATIVE DIAGNOSIS: same     PROCEDURE PERFORMED: revision of extruding Baerveldt glaucoma drainage device  OS with pericardial patch graft.     ATTENDING SURGEON: Perla Cortés M.D.     ASSISTANT: Joel Aden MD      ANESTHESIA: Local MAC    COMPLICATIONS: None.     BLOOD LOSS: Less than 5 mL     PROCEDURE IN DETAIL: After informed consent including the risks, benefits and   alternatives, the patient was brought to the Operating Room table. Monitored   anesthesia care was used throughout the entire case without any complications.     A 6-0 vicryl traction suture ws use to expose the inferor - nasal quadrant .   An incision throu the conj and tenons was made at the plate of the baerveldt .   The tube was identified and cut flush with the plate.  The tube which had eroded was then removed.   The AC was filled with provisc to stabilize the eye pressure.   A 8-0 vicryl was placed through the sclerostomy   The conjunctiva was undermined and cleaned from the sclerostomy site.  A pericardial patch graft was placed over the area and secured with 8-0 vicryl.   The conjunctiva was close using a combination of interrupted and running suture.   The provisc was irrigated out of the anterior chamber.   The was no leaking and the eye pressure was maintained.  Atropine eye drops were used. The traction sutre was removed.   A collagen shield soaked in pred acetate and vigamox was placed on the cornea and the eye was patched and shielded.    The patient tolerated the procedure and was placed in the recovery area.  He is to follow up in the eye clinic in the morning

## 2019-10-02 NOTE — INTERVAL H&P NOTE
H&P completed on 9/23/2019 has been reviewed, the patient examined and I concur with the findings and plan.

## 2019-10-02 NOTE — PROGRESS NOTES
Patient has been identified as having an implanted cardiac rhythm device (CRD); the implanted device is a [employer] defibrillator.      No noted pacer dependency.       DEFIBRILLATORS/NON-DEPENDENT ANY LOCATION    Per protocol,a magnet should be applied directly over the implanted device during entire surgical procedure when electrocautery will be used.    If no electrocautery is planned, magnet application is not needed.        For additional questions, please contact the Arrhythmia Department at Ext [phone].

## 2019-10-02 NOTE — DISCHARGE INSTRUCTIONS
Leave dressing on; Keep clean, dry and intact until appointment tomorrow.         Anesthesia: General Anesthesia     You are watched continuously during your procedure by your anesthesia provider.     Youre due to have surgery. During surgery, youll be given medicine called anesthesia or anesthetic. This will keep you comfortable and pain-free. Your anesthesia provider will use general anesthesia.  What is general anesthesia?  General anesthesia puts you into a state like deep sleep. It goes into the bloodstream (IV anesthetics), into the lungs (gas anesthetics), or both. You feel nothing during the procedure. You will not remember it. During the procedure, the anesthesia provider monitors you continuously. He or she checks your heart rate and rhythm, blood pressure, breathing, and blood oxygen.  · IV anesthetics. IV anesthetics are given through an IV line in your arm. Theyre often given first. This is so you are asleep before a gas anesthetic is started. Some kinds of IV anesthetics relieve pain. Others relax you. Your doctor will decide which kind is best in your case.  · Gas anesthetics. Gas anesthetics are breathed into the lungs. They are often used to keep you asleep. They can be given through a facemask or a tube placed in your larynx or trachea (breathing tube).  ¨ If you have a facemask, your anesthesia provider will most likely place it over your nose and mouth while youre still awake. Youll breathe oxygen through the mask as your IV anesthetic is started. Gas anesthetic may be added through the mask.  ¨ If you have a tube in the larynx or trachea, it will be inserted into your throat after youre asleep.  Anesthesia tools and medicines  You will likely have:  · IV anesthetics. These are put into an IV line into your bloodstream.  · Gas anesthetics. You breathe these anesthetics into your lungs, where they pass into your bloodstream.  · Pulse oximeter. This is a small clip that is attached to the  end of your finger. This measures your blood oxygen level.  · Electrocardiography leads (electrodes). These are small sticky pads that are placed on your chest. They record your heart rate and rhythm.  · Blood pressure cuff. This reads your blood pressure.  Risks and possible complications  General anesthesia has some risks. These include:  · Breathing problems  · Nausea and vomiting  · Sore throat or hoarseness (usually temporary)  · Allergic reaction to the anesthetic  · Irregular heartbeat (rare)  · Cardiac arrest (rare)   Anesthesia safety  · Follow all instructions you are given for how long not to eat or drink before your procedure.  · Be sure your doctor knows what medicines and drugs you take. This includes over-the-counter medicines, herbs, supplements, alcohol or other drugs. You will be asked when those were last taken.  · Have an adult family member or friend drive you home after the procedure.  · For the first 24 hours after your surgery:  ¨ Do not drive or use heavy equipment.  ¨ Do not make important decisions or sign legal documents. If important decisions or signing legal documents is necessary during the first 24 hours after surgery, have a trusted family member or spouse act on your behalf.  ¨ Avoid alcohol.  ¨ Have a responsible adult stay with you. He or she can watch for problems and help keep you safe.  Date Last Reviewed: 12/1/2016 © 2000-2017 [employer]. [address] [place] [address], [place] [address]. All rights reserved. This information is not intended as a substitute for professional medical care. Always follow your healthcare professional's instructions.        What Is Glaucoma?    Glaucoma is an eye disease that can cause blindness. If caught early, it can usually be controlled. But it often has no symptoms, so you need regular eye exams. Glaucoma usually begins when pressure builds up in the eye. This pressure can damage the optic nerve. The optic nerve sends messages to the  "brain so you can see. There are two main kinds of glaucoma: "open-angle" and "closed-angle."  Drainage area  The eye is always producing fluid. The eye's drainage areas may become clogged or blocked. Too much fluid stays in the eye. This increases eye pressure.  Optic nerve  Too much pressure in the eye can damage the optic nerve. If damaged, this nerve cannot send the messages to the brain that let you see.  Open-Angle Glaucoma  Open-angle is the most common kind of glaucoma. It occurs slowly as people age. The drainage area in the eye becomes clogged. Not enough fluid drains from the eye, so pressure slowly builds up. This causes gradual loss of side (peripheral) vision. You may not even notice changes until much of your vision is lost.  Closed-Angle Glaucoma  Closed-angle glaucoma is less common than open-angle. It usually comes on quickly. The drainage area in the eye suddenly becomes completely blocked. Eye pressure builds rapidly. You may notice blurred vision and rainbow halos around lights. You may also have headaches, nausea, vomiting, and severe pain. If not treated right away, blindness can occur quickly.  Date Last Reviewed: 6/1/2015  © 0748-7050 [employer]. [address] [place] [address], [place] [address]. All rights reserved. This information is not intended as a substitute for professional medical care. Always follow your healthcare professional's instructions.            "

## 2019-10-02 NOTE — ANESTHESIA PREPROCEDURE EVALUATION
10/02/2019  Marvin Ray is a 59 y.o., male presenting for I&D of left foot.    Past Medical History:   Diagnosis Date    Arthritis     Cellulitis     CKD (chronic kidney disease), stage III     Coronary artery disease     Diabetes mellitus     Diabetic retinopathy     Diabetic ulcer of left foot     Glaucoma     Gout     Hyperlipemia     Hypertension     ICD (implantable cardioverter-defibrillator) in place 11/02/2018    Left chest    Non-pressure chronic ulcer of other part of left foot with fat layer exposed 10/23/2018    PVD (peripheral vascular disease)     Type 2 diabetes mellitus with left diabetic foot ulcer 10/29/2018    Unsteady gait     uses a wheelchair     Past Surgical History:   Procedure Laterality Date    AHMED GLAUCOMA IMPLANT Left 2011    DONE AT [facility]    AORTOGRAPHY WITH SERIALOGRAPHY Left 4/30/2019    Procedure: AORTOGRAM, WITH SERIALOGRAPHY, LEFT LOWER EXTREMITY, POSSIBLE INTERVENTION;  Surgeon: Mitch Chan MD;  Location: [facility] OR;  Service: Vascular;  Laterality: Left;  RN PRE OP 4-23-19.  NO H & P, No Cosent  [place]    BAERVELDT GLAUCOMA IMPLANT Left 2012    WITH CATARACT EXTRACTION//DONE AT [facility]    CARDIAC CATHETERIZATION Left 05/2016    CARDIAC DEFIBRILLATOR PLACEMENT Left 11/02/2018    CATARACT EXTRACTION W/  INTRAOCULAR LENS IMPLANT Left 2012    WITH BAERVEDT//DONE AT [facility]    CATARACT EXTRACTION W/  INTRAOCULAR LENS IMPLANT Right 09/26/2018    COMPLEX ()    CB DESTRUCTION WITH CYCLO G6 Left 02/15/2017        CYST REMOVAL      DEBRIDEMENT OF FOOT  12/28/2018    Procedure: DEBRIDEMENT, FOOT;  Surgeon: Mitch Chan MD;  Location: [facility] OR;  Service: Vascular;;    DEBRIDEMENT OF FOOT  6/5/2019    Procedure: DEBRIDEMENT, FOOT;  Surgeon: Mitch Chan MD;  Location: [facility] OR;  Service: Vascular;;     EXAMINATION UNDER ANESTHESIA Left 12/5/2018    Procedure: Exam under anesthesia, left foot debridement, washout and all other indicated procedures;  Surgeon: Mitch Wall MD;  Location: [facility] OR;  Service: Vascular;  Laterality: Left;  1030AM START  RN PREOP 12/3/2018-----AICD  SEEN BY DR JAMES 12/4    EXAMINATION UNDER ANESTHESIA Left 2/13/2019    Procedure: LEFT FOOT WOUND DEBRIDEMENT, WASHOUT AND ALL OTHER INDICATED PROCEDURES;  Surgeon: Mitch Wall MD;  Location: [facility] OR;  Service: Vascular;  Laterality: Left;  requesting 1st case start  THIS CASE 1ST PER DR WALL ON 2/1/19 @ 844AM -[place]  RN PREOP 2/6/2019  HAS CARDIAC CLEARANCE    EXAMINATION UNDER ANESTHESIA Left 12/28/2018    Procedure: Exam under anesthesia, left foot debridement, left foot washout, possible left second through fifth metatarsal resection;  Surgeon: Mitch Wall MD;  Location: [facility] OR;  Service: Vascular;  Laterality: Left;  1:00pm start per julissa @ 8:58am  RN  PHONE PRE OP 12-27-18--=Pt coming from [facility]  NEED CONSENT    EXAMINATION UNDER ANESTHESIA Left 6/5/2019    Procedure: LEFT FOOT DEBRIDEMENT, WASHOUT AND ALL OTHER INDICATED PROCEDURES;  Surgeon: Mitch Wall MD;  Location: [facility] OR;  Service: Vascular;  Laterality: Left;  RN PRE OP 5-31-19------ICD------NEED CONSENT    INCISION AND DRAINAGE Left 11/14/2018    Procedure: Incision and Drainage, left lower extremity debridement, washout;  Surgeon: Mitch Wall MD;  Location: [facility] OR;  Service: Vascular;  Laterality: Left;    INCISION AND DRAINAGE Left 11/16/2018    Procedure: INCISION AND DRAINAGE;  Surgeon: Mitch Wall MD;  Location: [facility] OR;  Service: Vascular;  Laterality: Left;    INTRAOCULAR PROSTHESES INSERTION Right 9/26/2018    Procedure: INSERTION, IOL PROSTHESIS;  Surgeon: Perla Cortés MD;  Location: [facility] OR [address] [place] [address];  Service: Ophthalmology;  Laterality: Right;    PERITONEOCENTESIS N/A 3/1/2019     Procedure: PARACENTESIS, ABDOMINAL, INITIAL;  Surgeon: Dosc Diagnostic Provider;  Location: [facility] OR;  Service: Radiology;  Laterality: N/A;    PERITONEOCENTESIS N/A 3/25/2019    Procedure: PARACENTESIS, ABDOMINAL, INITIAL;  Surgeon: Dosc Diagnostic Provider;  Location: [facility] OR;  Service: Radiology;  Laterality: N/A;    PERITONEOCENTESIS N/A 7/22/2019    Procedure: PARACENTESIS, ABDOMINAL, INITIAL;  Surgeon: Dosc Diagnostic Provider;  Location: [facility] OR;  Service: Radiology;  Laterality: N/A;    PERITONEOCENTESIS N/A 8/16/2019    Procedure: PARACENTESIS, ABDOMINAL, INITIAL;  Surgeon: Dos Diagnostic Provider;  Location: [facility] OR;  Service: Radiology;  Laterality: N/A;    PERITONEOCENTESIS N/A 9/26/2019    Procedure: PARACENTESIS, ABDOMINAL;  Surgeon: Dos Diagnostic Provider;  Location: [facility] OR;  Service: Radiology;  Laterality: N/A;    PHACOEMULSIFICATION OF CATARACT Right 9/26/2018    Procedure: PHACOEMULSIFICATION, CATARACT;  Surgeon: Perla Cortés MD;  Location: [address];  Service: Ophthalmology;  Laterality: Right;    REPEAT ABDOMINAL PARACENTESIS N/A 2/11/2019    Procedure: PARACENTESIS, ABDOMINAL, REPEAT;  Surgeon: Dos Diagnostic Provider;  Location: [facility] OR;  Service: Radiology;  Laterality: N/A;  1PM START    REPEAT ABDOMINAL PARACENTESIS N/A 9/12/2019    Procedure: PARACENTESIS, ABDOMINAL, REPEAT;  Surgeon: Dos Diagnostic Provider;  Location: [facility] OR;  Service: Radiology;  Laterality: N/A;  9AM START    Right foot surgery  10/2014    TOE AMPUTATION Right     first and second    TOE AMPUTATION Left 11/16/2018    Procedure: AMPUTATION, TOES  2-5;  Surgeon: Mitch Chan MD;  Location: [facility] OR;  Service: Vascular;  Laterality: Left;    TOE AMPUTATION Left 12/5/2018    Procedure: AMPUTATION, TOE;  Surgeon: Mitch Chan MD;  Location: [facility] OR;  Service: Vascular;  Laterality: Left;  left great toe    TONSILLECTOMY       Review of patient's allergies indicates:   Allergen  Reactions    Statins-hmg-coa reductase inhibitors      Generalized Pain    Onglyza [saxagliptin]     Penicillins Rash     Current Facility-Administered Medications on File Prior to Visit   Medication Dose Route Frequency Provider Last Rate Last Dose    0.9%  NaCl infusion   Intravenous Continuous Perla Cortés MD 10 mL/hr at 10/02/19 0951      clindamycin 900 MG/50 ML D5W 900 mg/50 mL IVPB 900 mg  900 mg Intravenous Q8H Mitch Chan MD   900 mg at 06/05/19 1407    lactated ringers infusion   Intravenous Continuous Tam Reynolds MD        lidocaine (PF) 10 mg/ml (1%) injection 10 mg  1 mL Intradermal Once Tam Reynolds MD        moxifloxacin 0.5 % ophthalmic solution 1 drop  1 drop Left Eye On Call Procedure Perla Cortés MD   1 drop at 10/02/19 0940     Current Outpatient Medications on File Prior to Visit   Medication Sig Dispense Refill    allopurinol (ZYLOPRIM) 300 MG tablet Take 1 tablet (300 mg total) by mouth once daily. 30 tablet 3    aspirin 325 MG tablet Take 325 mg by mouth once daily.       bisacodyl (DULCOLAX) 5 mg EC tablet Take 5 mg by mouth daily as needed for Constipation.      brimonidine 0.2% (ALPHAGAN) 0.2 % Drop Place 1 drop into both eyes 2 (two) times daily. 10 mL 11    carvedilol (COREG) 3.125 MG tablet Take 1 tablet (3.125 mg total) by mouth 2 (two) times daily. 60 tablet 11    coenzyme Q10 100 mg capsule Take 100 mg by mouth every morning.      dorzolamide-timolol 2-0.5% (COSOPT) 22.3-6.8 mg/mL ophthalmic solution Place 1 drop into both eyes 2 (two) times daily. 1 Bottle 11    doxycycline (VIBRAMYCIN) 100 MG Cap Take 1 capsule (100 mg total) by mouth every 12 (twelve) hours. 14 capsule 0    ezetimibe (ZETIA) 10 mg tablet TAKE 1 TABLET(10 MG) BY MOUTH EVERY DAY 90 tablet 0    fish oil-omega-3 fatty acids 300-1,000 mg capsule Take 1 capsule by mouth 2 (two) times daily. 60 capsule 3    insulin aspart U-100 (NOVOLOG) 100 unit/mL InPn pen Use on  "premeal readings: 150-200=+2, 201-250=+4; 251-300=+6; 301-350=+8, over 350=+10 units 2.7 mL 11    insulin degludec-liraglutide (XULTOPHY 100/3.6) 100 unit-3.6 mg /mL (3 mL) InPn Inject 42 Units into the skin once daily. 5 Syringe 5    MULTIVIT,THER IRON,CA,FA & MIN (MULTIVITAMIN) Tab Take 1 tablet by mouth every morning.       pen needle, diabetic 31 gauge x 1/4" Ndle Use as directed 100 each 11    torsemide (DEMADEX) 20 MG Tab TAKE 4 TABLETS BY MOUTH TWICE DAILY 120 tablet 0     Lab Results   Component Value Date    WBC 5.13 08/22/2019    HGB 9.9 (L) 08/22/2019    HCT 32.0 (L) 08/22/2019    MCV 86 08/22/2019     08/22/2019     BMP  Lab Results   Component Value Date     08/22/2019    K 3.6 08/22/2019     08/22/2019    CO2 28 08/22/2019    BUN 39 (H) 08/22/2019    CREATININE 1.1 08/22/2019    CALCIUM 8.8 08/22/2019    ANIONGAP 8 08/22/2019    ESTGFRAFRICA >60 08/22/2019    EGFRNONAA >60 08/22/2019   \      Anesthesia Evaluation    I have reviewed the Patient Summary Reports.     I have reviewed the Medications.     Review of Systems  Anesthesia Hx:  No problems with previous Anesthesia   Denies Personal Hx of Anesthesia complications.   Cardiovascular:   Pacemaker Hypertension CAD   CHF    Renal/:   Chronic Renal Disease    Hepatic/GI:   Liver Disease,    Neurological:   Denies CVA. Denies Seizures.   Peripheral Neuropathy        Physical Exam  General:  Well nourished    Airway/Jaw/Neck:  Airway Findings: Mouth Opening: Normal Tongue: Normal  General Airway Assessment: Adult  Mallampati: II  TM Distance: Normal, at least 6 cm  Jaw/Neck Findings:  Neck ROM: Normal ROM      Dental:  Dental Findings: Periodontal disease, Severe         Mental Status:  Mental Status Findings:  Cooperative, Alert and Oriented         Anesthesia Plan  Type of Anesthesia, risks & benefits discussed:  Anesthesia Type:  MAC, regional  Patient's Preference:   Intra-op Monitoring Plan: standard ASA monitors  Intra-op " Monitoring Plan Comments:   Post Op Pain Control Plan: per primary service following discharge from PACU  Post Op Pain Control Plan Comments:   Induction:   IV  Beta Blocker:  Patient is not currently on a Beta-Blocker (No further documentation required).       Informed Consent: Patient understands risks and agrees with Anesthesia plan.  Questions answered. Anesthesia consent signed with patient.  ASA Score: 4     Day of Surgery Review of History & Physical:            Ready For Surgery From Anesthesia Perspective.                                                                                                                  10/02/2019  Marvin Ray is a 59 y.o., male.    Anesthesia Evaluation    I have reviewed the Patient Summary Reports.        Review of Systems  Anesthesia Hx:  No problems with previous Anesthesia    Social:  Non-Smoker    Hematology/Oncology:  Hematology Normal   Oncology Normal     EENT/Dental:EENT/Dental Normal   Cardiovascular:   Pacemaker Hypertension CAD   CHF (EF 20%, RV dysfunction)    Pulmonary:  Pulmonary Normal    Renal/:   Chronic Renal Disease, CRI    Hepatic/GI:  Hepatic/GI Normal    Musculoskeletal:  Musculoskeletal Normal    Neurological:  Neurology Normal    Endocrine:   Diabetes, type 2    Dermatological:  Skin Normal    Psych:  Psychiatric Normal           Physical Exam  General:  Well nourished    Airway/Jaw/Neck:  Airway Findings: Mouth Opening: Normal Tongue: Normal  General Airway Assessment: Adult  Mallampati: II  Improves to II with phonation.  TM Distance: Normal, at least 6 cm  Jaw/Neck Findings:  Neck ROM: Normal ROM      Dental:  Dental Findings: In tact   Chest/Lungs:  Chest/Lungs Findings: Clear to auscultation, Normal Respiratory Rate     Heart/Vascular:  Heart Findings: Rate: Normal  Rhythm: Regular Rhythm  Sounds: Normal             Anesthesia Plan  Type of Anesthesia, risks & benefits discussed:  Anesthesia Type:  general, MAC  Patient's Preference:  MAC  Intra-op Monitoring Plan:   Intra-op Monitoring Plan Comments:   Post Op Pain Control Plan:   Post Op Pain Control Plan Comments:   Induction:   IV  Beta Blocker:  Patient is not currently on a Beta-Blocker (No further documentation required).       Informed Consent: Patient understands risks and agrees with Anesthesia plan.  Questions answered. Anesthesia consent signed with patient.  ASA Score: 4     Day of Surgery Review of History & Physical: I have interviewed and examined the patient. I have reviewed the patient's H&P dated:  There are no significant changes.          Ready For Surgery From Anesthesia Perspective.

## 2019-10-02 NOTE — TRANSFER OF CARE
"Anesthesia Transfer of Care Note    Patient: Marvin Ray    Procedure(s) Performed: Procedure(s) (LRB):  REVISION OF PROCEDURE INVOLVING GLAUCOMA DRAINAGE DEVICE/MAY NEED  PERICARDIAL GRAFT OR CONJUNCTIVAL GRAFT. (Left)    Patient location: [facility]    Anesthesia Type: MAC    Transport from OR: Transported from OR on room air with adequate spontaneous ventilation    Post pain: adequate analgesia    Post assessment: no apparent anesthetic complications and tolerated procedure well    Post vital signs: stable    Level of consciousness: awake and alert    Nausea/Vomiting: no nausea/vomiting    Complications: none    Transfer of care protocol was followed      Last vitals:   Visit Vitals  BP (!) 112/59 (BP Location: Left arm, Patient Position: Lying)   Pulse 69   Temp 36.8 °C (98.2 °F) (Temporal)   Resp 18   Ht 5' 10" (1.778 m)   Wt 91.2 kg (201 lb)   SpO2 (!) 94%   BMI 28.84 kg/m²     "

## 2019-10-02 NOTE — PLAN OF CARE
Pt tolerated procedure well. Discharge instructions printed and reviewed with patient.  Copies of discharge instructions sent home with patient.  Pt tolerating PO liquids well.  Pt not complaining of pain or nausea. VSS.  Safety maintained throughout care.

## 2019-10-03 ENCOUNTER — OFFICE VISIT (OUTPATIENT)
Dept: OPHTHALMOLOGY | Facility: CLINIC | Age: 59
End: 2019-10-03
Payer: COMMERCIAL

## 2019-10-03 DIAGNOSIS — H21.1X1 RUBEOSIS IRIDIS, RIGHT: ICD-10-CM

## 2019-10-03 DIAGNOSIS — Z96.1 PSEUDOPHAKIA: ICD-10-CM

## 2019-10-03 DIAGNOSIS — Z96.89 HISTORY OF GLAUCOMA TUBE SHUNT PROCEDURE: ICD-10-CM

## 2019-10-03 DIAGNOSIS — H35.373 EPIRETINAL MEMBRANE, BOTH EYES: ICD-10-CM

## 2019-10-03 DIAGNOSIS — H40.51X1 NEOVASCULAR GLAUCOMA, RIGHT EYE, MILD STAGE: ICD-10-CM

## 2019-10-03 DIAGNOSIS — H40.52X3 NEOVASCULAR GLAUCOMA, LEFT EYE, SEVERE STAGE: Primary | ICD-10-CM

## 2019-10-03 PROCEDURE — 99024 POSTOP FOLLOW-UP VISIT: CPT | Mod: S$GLB,,, | Performed by: OPHTHALMOLOGY

## 2019-10-03 PROCEDURE — 99999 PR PBB SHADOW E&M-EST. PATIENT-LVL II: ICD-10-PCS | Mod: PBBFAC,,, | Performed by: OPHTHALMOLOGY

## 2019-10-03 PROCEDURE — 99999 PR PBB SHADOW E&M-EST. PATIENT-LVL II: CPT | Mod: PBBFAC,,, | Performed by: OPHTHALMOLOGY

## 2019-10-03 PROCEDURE — 99024 PR POST-OP FOLLOW-UP VISIT: ICD-10-PCS | Mod: S$GLB,,, | Performed by: OPHTHALMOLOGY

## 2019-10-03 RX ORDER — PREDNISOLONE ACETATE 10 MG/ML
1 SUSPENSION/ DROPS OPHTHALMIC 4 TIMES DAILY
COMMUNITY
Start: 2019-10-03 | End: 2019-10-10 | Stop reason: SDUPTHER

## 2019-10-03 RX ORDER — MOXIFLOXACIN 5 MG/ML
1 SOLUTION/ DROPS OPHTHALMIC 4 TIMES DAILY
COMMUNITY
Start: 2019-10-03 | End: 2019-10-12

## 2019-10-03 NOTE — ANESTHESIA POSTPROCEDURE EVALUATION
Anesthesia Post Evaluation    Patient: Marvin Ray    Procedure(s) Performed: Procedure(s) (LRB):  REVISION OF PROCEDURE INVOLVING GLAUCOMA DRAINAGE DEVICE/MAY NEED  PERICARDIAL GRAFT OR CONJUNCTIVAL GRAFT. (Left)    Final Anesthesia Type: MAC  Patient location during evaluation: PACU  Patient participation: Yes- Able to Participate  Level of consciousness: awake and alert  Post-procedure vital signs: reviewed and stable  Pain management: adequate  Airway patency: patent  PONV status at discharge: No PONV  Anesthetic complications: no      Cardiovascular status: blood pressure returned to baseline and stable  Respiratory status: unassisted  Hydration status: euvolemic  Follow-up not needed.          Vitals Value Taken Time   /59 10/2/2019 12:37 PM   Temp 36.4 °C (97.6 °F) 10/2/2019 12:35 PM   Pulse 68 10/2/2019 12:40 PM   Resp 25 10/2/2019 12:40 PM   SpO2 95 % 10/2/2019 12:39 PM   Vitals shown include unvalidated device data.      No case tracking events are documented in the log.      Pain/Ralf Score: Ralf Score: 10 (10/2/2019 12:05 PM)

## 2019-10-08 ENCOUNTER — HOSPITAL ENCOUNTER (OUTPATIENT)
Dept: WOUND CARE | Facility: HOSPITAL | Age: 59
Discharge: HOME | End: 2019-10-08
Attending: FAMILY MEDICINE
Payer: COMMERCIAL

## 2019-10-08 DIAGNOSIS — L97.529 TYPE 2 DIABETES MELLITUS WITH LEFT DIABETIC FOOT ULCER: Primary | ICD-10-CM

## 2019-10-08 DIAGNOSIS — E11.621 TYPE 2 DIABETES MELLITUS WITH LEFT DIABETIC FOOT ULCER: Primary | ICD-10-CM

## 2019-10-08 PROCEDURE — 99214 OFFICE O/P EST MOD 30 MIN: CPT | Mod: ,,, | Performed by: FAMILY MEDICINE

## 2019-10-08 PROCEDURE — 99215 OFFICE O/P EST HI 40 MIN: CPT | Performed by: FAMILY MEDICINE

## 2019-10-08 PROCEDURE — 99214 PR OFFICE/OUTPT VISIT, EST, LEVL IV, 30-39 MIN: ICD-10-PCS | Mod: ,,, | Performed by: FAMILY MEDICINE

## 2019-10-09 ENCOUNTER — TELEPHONE (OUTPATIENT)
Dept: HOME HEALTH SERVICES | Facility: HOSPITAL | Age: 59
End: 2019-10-09

## 2019-10-09 NOTE — ADDENDUM NOTE
Addended by: EFE MANRIQUE on: 7/17/2019 01:34 PM     Modules accepted: Orders     Nils Wolfe is being seen in physical therapy s/p rotator cuff surgery on 7/16/19. His progress has been slow but his range of motion is improving and he is slowly tolerating more exercise although we remain behind schedule according to the protocol. He still demonstrates poor mechanics with reaching overhead and away from body which began after he needed to take an almost 3 week break from PT early on as he went out of town for work. He expressed concern at his appointment yesterday regarding his pain levels and how functionally limited he still is. He stated he was interested in asking for an MRI to ensure everything was healing OK. I wanted to reach you and give you an update especially since it looks like he does not have a follow-up appointment scheduled with you at this time. Please let me know if you have any questions.    Jessica Sheikh, PT

## 2019-10-10 ENCOUNTER — OFFICE VISIT (OUTPATIENT)
Dept: OPHTHALMOLOGY | Facility: CLINIC | Age: 59
End: 2019-10-10
Payer: COMMERCIAL

## 2019-10-10 DIAGNOSIS — T85.9XXA DISORDER DUE TO INSERTION OF GLAUCOMA DRAINAGE DEVICE: ICD-10-CM

## 2019-10-10 DIAGNOSIS — Z96.89 HISTORY OF GLAUCOMA TUBE SHUNT PROCEDURE: ICD-10-CM

## 2019-10-10 DIAGNOSIS — Z48.89 ENCOUNTER FOR POSTOPERATIVE CARE: Primary | ICD-10-CM

## 2019-10-10 DIAGNOSIS — H40.51X1 NEOVASCULAR GLAUCOMA, RIGHT EYE, MILD STAGE: ICD-10-CM

## 2019-10-10 DIAGNOSIS — H21.1X1 RUBEOSIS IRIDIS, RIGHT: ICD-10-CM

## 2019-10-10 DIAGNOSIS — H35.373 EPIRETINAL MEMBRANE, BOTH EYES: ICD-10-CM

## 2019-10-10 DIAGNOSIS — H40.52X3 NEOVASCULAR GLAUCOMA, LEFT EYE, SEVERE STAGE: ICD-10-CM

## 2019-10-10 DIAGNOSIS — Z96.1 PSEUDOPHAKIA: ICD-10-CM

## 2019-10-10 PROCEDURE — 99024 POSTOP FOLLOW-UP VISIT: CPT | Mod: S$GLB,,, | Performed by: OPHTHALMOLOGY

## 2019-10-10 PROCEDURE — 99999 PR PBB SHADOW E&M-EST. PATIENT-LVL II: ICD-10-PCS | Mod: PBBFAC,,, | Performed by: OPHTHALMOLOGY

## 2019-10-10 PROCEDURE — 99999 PR PBB SHADOW E&M-EST. PATIENT-LVL II: CPT | Mod: PBBFAC,,, | Performed by: OPHTHALMOLOGY

## 2019-10-10 PROCEDURE — 99024 PR POST-OP FOLLOW-UP VISIT: ICD-10-PCS | Mod: S$GLB,,, | Performed by: OPHTHALMOLOGY

## 2019-10-10 RX ORDER — PREDNISOLONE ACETATE 10 MG/ML
1 SUSPENSION/ DROPS OPHTHALMIC EVERY MORNING
Qty: 1 BOTTLE | Refills: 0 | Status: ON HOLD | OUTPATIENT
Start: 2019-10-10 | End: 2020-03-16

## 2019-10-10 NOTE — PROGRESS NOTES
HPI     DLS: 10/03/19    S/p Revision of Extruding Baerveldt OS 10/2/19   (with pericardial patch graft)   Pt states no eye pain but vision is still blurry.     Meds: Vigamox TID OS   PA TID OS  Atropine QDAY OS    Cosopt TID OU   Brimonidine TID OU     1. NVG OS   2. POAG OD   3. PDRw/ CSME OU   4. DME ou   5. NS OD   5. PC IOL OS   6. ERM os     Last edited by Brenda Don on 10/10/2019 10:27 AM. (History)              Assessment /Plan     For exam results, see Encounter Report.    Encounter for postoperative care    Neovascular glaucoma, left eye, severe stage    Neovascular glaucoma, right eye, mild stage    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 edema, with long-term current use of insulin    Epiretinal membrane, both eyes    Rubeosis iridis, right    Pseudophakia    History of glaucoma tube shunt procedure    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        F/u TUBE ERROSION THROUGH CONJUNCTIVA     Pt has been followed in the retina and glaucoma clinics at [facility] for many years  Now is transferring to [facility] - main campus (2016)   S/P PRP ou for PDR ou   S/P multiple avastin injections ou  + NVG os // ? POAG od   S/P ahmed os 2011  S/P phaco/IOL / baerveldt os 2012       1. NVG OS 2/2 PDR // ?? POAG od - on gtts    First HVF   ? 10/2014    First photos   6/2016   Treatment / Drops started   Years ago           Family history    ?        Glaucoma meds    cosopt ou / alphagan ou         H/O adverse rxn to glaucoma drops    ? Try and avoid PG's - PDR with ME and NVG os         LASERS    SLT - ? Os or OU // G6 laser os // PRP ou         GLAUCOMA SURGERIES    ahmed os- 2011 - [facility] - ? [place]  // baerveldt os - [place] - ? [place]  - both at [facility] /   / G6 laser os 2/15/2017        OTHER EYE SURGERIES    Phaco/IOL od 9/26/2018         CDR    0.4/0.9        Tbase    ??  (on gtts at [facility] from 2014 to 2016  ran - 12-19 od // 16-25 os )          Tmax    ?             Ttarget    ?             HVF    4 test 2014 to  2015 - [place] [facility] 2 test 2016 to 2019  -24-2 ss  SAD/IAD od // 24-2 stim V gen dep - SAD / IAD os         Gonio    +3 od // +3 sup os with PAS T/N/I         CCT    449/540        OCT    2 test 2016 to 2018 - RNFL - dec S/I od  od // dec S/I, bord N  os        HRT    2 test 2017 to 2018 - MR - nl  od // dec TI/ bord NI  os        Disc photos    2016     - Ttoday   13/22  - Test done today    F/U revision of GDD tube errosion OS of the infero-nasal tube  - 10/3/2019      - OFF -  Xalatan OD due to macular edema  - pt reports good adherence  Cont  cosopt bid ou, brimonidine bid ou        2.  PDR s/p PRP OU (DM2), with CSME OU (and HTN)  - Encouraged good BS/BP/Cholesterol control    3. DME OU  OD>OS  S/p Avastin OD x 9, OS x 7  Will monitor OS for now given NVG and ERM      4. PSK OS  - Stable, CTM, RD precautions       5. ERM OS  Given NVG hx, monitor  - CTM    6. PC IOL OU    OD 9/26/2018 - pcb00 17.0   Os       Cont cosopt ou tid   Cont alphagan tid ou   Stay off  latanoprost - - 2/2 DME od     H/O  RUBEOSIS AT THE PUPIL OD AND 1 SMALL AREA OF ANGLE VIRI ING OD (post CE)    S/P more avastin od 10/9/2018 - [facility] - rubeosis regressed    S/p fill in PRP od - 11/26/2018 - [facility] - rubeosis regressed    S/p MORE AVASTIN OD 1/25/2019     VA good - BCVA - 20/40 - 11/30/2018    If IOP goes up OS  can re-start PG's os and / or repeat cyclo G6    5/24/2019 - pt has been very ill   + CHF   + PVD - had toes amputated left foot   -S/P angioplasty to open leg vessels   Out of hospital and at home - now - wound care continues     GDD - tube errosion of the IN tube OS - // revision // tube no longer functions - 10/2/2019     POW # 1   anterior chamber depth  Deep   Bleb appearance  None  Patch graft in place IN quadrant   sidell neg  IOP  22     Meds: - operated eye  Pred acetate tid - DECREASE TO 1 X DAY   vigamox tid - STOP   Atropine 1 x day  -  CONT  Till runs out     cosopt tid ou   Brimonidine tid ou     Shield at night   Glasses day     F/U  1 month  // if IOP is too high  can try PG or rhopressa / or more G6 laser      I have seen and personally examined the patient.  I agree with the findings, assessment and plan of the resident and/or fellow.     Perla Cortés MD

## 2019-10-11 ENCOUNTER — HOSPITAL ENCOUNTER (OUTPATIENT)
Facility: HOSPITAL | Age: 59
Discharge: HOME | End: 2019-10-11
Attending: RADIOLOGY | Admitting: RADIOLOGY
Payer: COMMERCIAL

## 2019-10-11 ENCOUNTER — HOSPITAL ENCOUNTER (OUTPATIENT)
Dept: INTERVENTIONAL RADIOLOGY/VASCULAR | Facility: HOSPITAL | Age: 59
Discharge: HOME | End: 2019-10-11
Attending: FAMILY MEDICINE
Payer: COMMERCIAL

## 2019-10-11 VITALS
SYSTOLIC BLOOD PRESSURE: 111 MMHG | SYSTOLIC BLOOD PRESSURE: 112 MMHG | HEART RATE: 76 BPM | TEMPERATURE: 98 F | OXYGEN SATURATION: 95 % | DIASTOLIC BLOOD PRESSURE: 59 MMHG | RESPIRATION RATE: 18 BRPM | DIASTOLIC BLOOD PRESSURE: 62 MMHG

## 2019-10-11 DIAGNOSIS — R18.8 OTHER ASCITES: ICD-10-CM

## 2019-10-11 PROCEDURE — A7048 VACUUM DRAIN BOTTLE/TUBE KIT: HCPCS

## 2019-10-11 PROCEDURE — 49083 ABD PARACENTESIS W/IMAGING: CPT | Mod: ,,, | Performed by: RADIOLOGY

## 2019-10-11 PROCEDURE — 49083 IR PARACENTESIS WITH IMAGING: ICD-10-PCS | Mod: ,,, | Performed by: RADIOLOGY

## 2019-10-11 PROCEDURE — 49083 ABD PARACENTESIS W/IMAGING: CPT

## 2019-10-11 PROCEDURE — P9047 ALBUMIN (HUMAN), 25%, 50ML: HCPCS | Mod: JG | Performed by: RADIOLOGY

## 2019-10-11 PROCEDURE — 63600175 PHARM REV CODE 636 W HCPCS: Mod: JG | Performed by: RADIOLOGY

## 2019-10-11 RX ORDER — ALBUMIN HUMAN 250 G/1000ML
SOLUTION INTRAVENOUS
Status: DISCONTINUED
Start: 2019-10-11 | End: 2019-10-11 | Stop reason: HOSPADM

## 2019-10-11 RX ORDER — HYDROCODONE BITARTRATE AND ACETAMINOPHEN 5; 325 MG/1; MG/1
1 TABLET ORAL EVERY 4 HOURS PRN
Status: DISCONTINUED | OUTPATIENT
Start: 2019-10-11 | End: 2019-10-11 | Stop reason: HOSPADM

## 2019-10-11 RX ORDER — ALBUMIN HUMAN 250 G/1000ML
50 SOLUTION INTRAVENOUS ONCE AS NEEDED
Status: COMPLETED | OUTPATIENT
Start: 2019-10-11 | End: 2019-10-11

## 2019-10-11 RX ORDER — HYDROCODONE BITARTRATE AND ACETAMINOPHEN 5; 325 MG/1; MG/1
1 TABLET ORAL EVERY 4 HOURS PRN
Status: CANCELLED | OUTPATIENT
Start: 2019-10-11

## 2019-10-11 RX ADMIN — ALBUMIN (HUMAN) 50 G: 25 SOLUTION INTRAVENOUS at 12:10

## 2019-10-11 NOTE — PROGRESS NOTES
Paracentesis completed. 8.2L removed. Site dressed with bandaid, CDI; no redness, swelling, or hematoma noted. VSS. NADN. Denies pain and SOB. Report called to JILLIAN Zambrano in SDS.

## 2019-10-11 NOTE — DISCHARGE INSTRUCTIONS
BATHING:  ? You may shower tomorrow.  DRESSING:  ? Remove dressing tomorrow.        ACTIVITY LEVEL: If you have received sedation or an anesthetic, you may feel sleepy for several hours. Rest until you are more awake. Gradually resume your normal activities    Do not drive, drink alcohol, or sign legal documents for 24 hours, or if taking narcotic pain medication.      DIET: You may resume your home diet. If nausea is present, increase your diet gradually with fluids and bland foods.    Medications: Pain medication should be taken only if needed and as directed. If antibiotics are prescribed, the medication should be taken until completed. You will be given an updated list of you medications.  ? No driving, alcoholic beverages or signing legal documents for next 24 hours if you have had sedation, or while taking pain medication    CALL THE DOCTOR:   For any obvious bleeding (some dried blood over the incision is normal).     Redness, swelling, foul smell around incision or fever over 101.  Shortness of breath.  Persistent pain or nausea not relieved by medication.  Call  [phone]     to speak with an Interventional Radiologist    If any unusual problems or difficulties occur contact your doctor. If you cannot contact your doctor but feel your signs and symptoms warrant a physicians attention return to the emergency room.     Fall Prevention  Millions of people fall every year and injure themselves. You may have had anesthesia or sedation which may increase your risk of falling. You may have health issues that put you at an increased risk of falling.     Here are ways to reduce your risk of falling.  ·   · Make your home safe by keeping walkways clear of objects you may trip over.  · Use non-slip pads under rugs. Do not use area rugs or small throw rugs.  · Use non-slip mats in bathtubs and showers.  · Install handrails and lights on staircases.  · Do not walk in poorly lit areas.  · Do not stand on chairs or wobbly  ladders.  · Use caution when reaching overhead or looking upward. This position can cause a loss of balance.  · Be sure your shoes fit properly, have non-slip bottoms and are in good condition.   · Wear shoes both inside and out. Avoid going barefoot or wearing slippers.  · Be cautious when going up and down stairs, curbs, and when walking on uneven sidewalks.  · If your balance is poor, consider using a cane or walker.  · If your fall was related to alcohol use, stop or limit alcohol intake.   · If your fall was related to use of sleeping medicines, talk to your doctor about this. You may need to reduce your dosage at bedtime if you awaken during the night to go to the bathroom.    · To reduce the need for nighttime bathroom trips:  ¨ Avoid drinking fluids for several hours before going to bed  ¨ Empty your bladder before going to bed  ¨ Men can keep a urinal at the bedside  · Stay as active as you can. Balance, flexibility, strength, and endurance all come from exercise. They all play a role in preventing falls. Ask your healthcare provider which types of activity are right for you.  · Get your vision checked on a regular basis.  · If you have pets, know where they are before you stand up or walk so you don't trip over them.  Use night lights.

## 2019-10-11 NOTE — PROCEDURES
Marvin Ray is a 59 y.o. male patient.    BP: 111/62 (10/11/19 1200)       Procedures     [facility]  Interventional Radiology    Procedure Performed by: Veronica    Procedure: paracentesis    Pre-Op Diagnosis: ascities    Post-Op Diagnosis/Findings: same    Estimated Blood Loss: none    Specimen/Tissue Removed: Yes - 8.2 L    Date: 10/11/2019 Time: 12:05 PM    Maciej Vogel  10/11/2019

## 2019-10-11 NOTE — H&P
[facility]  History & Physical  [facility]  History & Physical - Short Stay  Interventional Radiology    SUBJECTIVE:     Chief Complaint/Reason for Admission: ascites     Informant(s):  self    History of Present Illness:  Marvin Ray is a 59 y.o. male with a ascites history.  Patient presents for paracentesis.    Scheduled Meds:   Continuous Infusions:   PRN Meds:     Review of patient's allergies indicates:   Allergen Reactions    Statins-hmg-coa reductase inhibitors      Generalized Pain    Onglyza [saxagliptin]     Penicillins Rash       Past Medical History:   Diagnosis Date    Arthritis     Cellulitis     CKD (chronic kidney disease), stage III     Coronary artery disease     Diabetes mellitus     Diabetic retinopathy     Diabetic ulcer of left foot     Glaucoma     Gout     Hyperlipemia     Hypertension     ICD (implantable cardioverter-defibrillator) in place 11/02/2018    Left chest    Non-pressure chronic ulcer of other part of left foot with fat layer exposed 10/23/2018    PVD (peripheral vascular disease)     Type 2 diabetes mellitus with left diabetic foot ulcer 10/29/2018    Unsteady gait     uses a wheelchair     Past Surgical History:   Procedure Laterality Date    AHMED GLAUCOMA IMPLANT Left 2011    DONE AT [facility]    AORTOGRAPHY WITH SERIALOGRAPHY Left 4/30/2019    Procedure: AORTOGRAM, WITH SERIALOGRAPHY, LEFT LOWER EXTREMITY, POSSIBLE INTERVENTION;  Surgeon: Mitch Chan MD;  Location: [facility] OR;  Service: Vascular;  Laterality: Left;  RN PRE OP 4-23-19.  NO H & P, No Cosent  [place]    BAERVELDT GLAUCOMA IMPLANT Left 2012    WITH CATARACT EXTRACTION//DONE AT [facility]    CARDIAC CATHETERIZATION Left 05/2016    CARDIAC DEFIBRILLATOR PLACEMENT Left 11/02/2018    CATARACT EXTRACTION W/  INTRAOCULAR LENS IMPLANT Left 2012    WITH BAERVEDT//DONE AT [facility]    CATARACT EXTRACTION W/  INTRAOCULAR LENS IMPLANT Right 09/26/2018     COMPLEX ()    CB DESTRUCTION WITH CYCLO G6 Left 02/15/2017        CYST REMOVAL      DEBRIDEMENT OF FOOT  12/28/2018    Procedure: DEBRIDEMENT, FOOT;  Surgeon: Mitch Wall MD;  Location: [facility] OR;  Service: Vascular;;    DEBRIDEMENT OF FOOT  6/5/2019    Procedure: DEBRIDEMENT, FOOT;  Surgeon: Mitch Wall MD;  Location: [facility] OR;  Service: Vascular;;    EXAMINATION UNDER ANESTHESIA Left 12/5/2018    Procedure: Exam under anesthesia, left foot debridement, washout and all other indicated procedures;  Surgeon: Mitch Wall MD;  Location: [facility] OR;  Service: Vascular;  Laterality: Left;  1030AM START  RN PREOP 12/3/2018-----AICD  SEEN BY DR JAMES 12/4    EXAMINATION UNDER ANESTHESIA Left 2/13/2019    Procedure: LEFT FOOT WOUND DEBRIDEMENT, WASHOUT AND ALL OTHER INDICATED PROCEDURES;  Surgeon: Mitch Wall MD;  Location: [facility] OR;  Service: Vascular;  Laterality: Left;  requesting 1st case start  THIS CASE 1ST PER DR WALL ON 2/1/19 @ 844AM -  RN PREOP 2/6/2019  HAS CARDIAC CLEARANCE    EXAMINATION UNDER ANESTHESIA Left 12/28/2018    Procedure: Exam under anesthesia, left foot debridement, left foot washout, possible left second through fifth metatarsal resection;  Surgeon: Mitch Wall MD;  Location: [facility] OR;  Service: Vascular;  Laterality: Left;  1:00pm start per julissa @ 8:58am  RN  PHONE PRE OP 12-27-18--=[facility]  NEED CONSENT    EXAMINATION UNDER ANESTHESIA Left 6/5/2019    Procedure: LEFT FOOT DEBRIDEMENT, WASHOUT AND ALL OTHER INDICATED PROCEDURES;  Surgeon: Mitch Wall MD;  Location: [facility] OR;  Service: Vascular;  Laterality: Left;  RN PRE OP 5-31-19------ICD------NEED CONSENT    INCISION AND DRAINAGE Left 11/14/2018    Procedure: Incision and Drainage, left lower extremity debridement, washout;  Surgeon: Mitch Wall MD;  Location: [facility] OR;  Service: Vascular;  Laterality: Left;    INCISION AND  DRAINAGE Left 11/16/2018    Procedure: INCISION AND DRAINAGE;  Surgeon: Mitch Chan MD;  Location: [facility] OR;  Service: Vascular;  Laterality: Left;    INTRAOCULAR PROSTHESES INSERTION Right 9/26/2018    Procedure: INSERTION, IOL PROSTHESIS;  Surgeon: Perla Cortés MD;  Location: [facility] OR 1ST FLR;  Service: Ophthalmology;  Laterality: Right;    PERITONEOCENTESIS N/A 3/1/2019    Procedure: PARACENTESIS, ABDOMINAL, INITIAL;  Surgeon: Dosc Diagnostic Provider;  Location: [facility] OR;  Service: Radiology;  Laterality: N/A;    PERITONEOCENTESIS N/A 3/25/2019    Procedure: PARACENTESIS, ABDOMINAL, INITIAL;  Surgeon: Dosc Diagnostic Provider;  Location: [facility] OR;  Service: Radiology;  Laterality: N/A;    PERITONEOCENTESIS N/A 7/22/2019    Procedure: PARACENTESIS, ABDOMINAL, INITIAL;  Surgeon: Dosc Diagnostic Provider;  Location: [facility] OR;  Service: Radiology;  Laterality: N/A;    PERITONEOCENTESIS N/A 8/16/2019    Procedure: PARACENTESIS, ABDOMINAL, INITIAL;  Surgeon: Dosc Diagnostic Provider;  Location: [facility] OR;  Service: Radiology;  Laterality: N/A;    PERITONEOCENTESIS N/A 9/26/2019    Procedure: PARACENTESIS, ABDOMINAL;  Surgeon: Dosc Diagnostic Provider;  Location: [facility] OR;  Service: Radiology;  Laterality: N/A;    PHACOEMULSIFICATION OF CATARACT Right 9/26/2018    Procedure: PHACOEMULSIFICATION, CATARACT;  Surgeon: Perla Cortés MD;  Location: [facility] OR 1ST FLR;  Service: Ophthalmology;  Laterality: Right;    REPEAT ABDOMINAL PARACENTESIS N/A 2/11/2019    Procedure: PARACENTESIS, ABDOMINAL, REPEAT;  Surgeon: Dosc Diagnostic Provider;  Location: [facility] OR;  Service: Radiology;  Laterality: N/A;  1PM START    REPEAT ABDOMINAL PARACENTESIS N/A 9/12/2019    Procedure: PARACENTESIS, ABDOMINAL, REPEAT;  Surgeon: Dosc Diagnostic Provider;  Location: [facility] OR;  Service: Radiology;  Laterality: N/A;  9AM START    REVISION OF PROCEDURE INVOLVING GLAUCOMA DRAINAGE DEVICE Left 10/2/2019    EXTRUDING BAERVELDT  /WITH PERICARDIAL PATCH GRAFT ()    Right foot surgery  10/2014    TOE AMPUTATION Right     first and second    TOE AMPUTATION Left 2018    Procedure: AMPUTATION, TOES  2-5;  Surgeon: Mitch Chan MD;  Location: [facility] OR;  Service: Vascular;  Laterality: Left;    TOE AMPUTATION Left 2018    Procedure: AMPUTATION, TOE;  Surgeon: Mitch Chan MD;  Location: [facility] OR;  Service: Vascular;  Laterality: Left;  left great toe    TONSILLECTOMY       Family History   Problem Relation Age of Onset    Diabetes Mother     Heart disease Brother     No Known Problems Father     No Known Problems Sister     No Known Problems Maternal Aunt     No Known Problems Maternal Uncle     No Known Problems Paternal Aunt     No Known Problems Paternal Uncle     No Known Problems Maternal Grandmother     No Known Problems Maternal Grandfather     No Known Problems Paternal Grandmother     No Known Problems Paternal Grandfather     Anemia Neg Hx     Arrhythmia Neg Hx     Asthma Neg Hx     Clotting disorder Neg Hx     Fainting Neg Hx     Heart attack Neg Hx     Heart failure Neg Hx     Hyperlipidemia Neg Hx     Hypertension Neg Hx     Stroke Neg Hx     Atrial Septal Defect Neg Hx     Amblyopia Neg Hx     Blindness Neg Hx     Glaucoma Neg Hx     Macular degeneration Neg Hx     Retinal detachment Neg Hx     Strabismus Neg Hx      Social History     Tobacco Use    Smoking status: Former Smoker     Packs/day: 1.00     Years: 3.00     Pack years: 3.00     Types: Cigarettes     Last attempt to quit: 1984     Years since quittin.2    Smokeless tobacco: Never Used   Substance Use Topics    Alcohol use: Not Currently     Alcohol/week: 1.0 standard drinks     Types: 1 Cans of beer per week     Comment: rarely    Drug use: No        Review of Systems:  no new complaints    OBJECTIVE:     Vital Signs (Most Recent):  BP: 109/62 (10/11/19 1110)    Physical Exam:  bedside  ultrasound shows ascities    Laboratory  CBC:   Lab Results   Component Value Date/Time    WBC 5.13 08/22/2019 08:20 AM    RBC 3.73 (L) 08/22/2019 08:20 AM    HGB 9.9 (L) 08/22/2019 08:20 AM    HCT 32.0 (L) 08/22/2019 08:20 AM     08/22/2019 08:20 AM    MCV 86 08/22/2019 08:20 AM    MCH 26.5 (L) 08/22/2019 08:20 AM    MCHC 30.9 (L) 08/22/2019 08:20 AM     BMP:   Lab Results   Component Value Date/Time    GLU 83 08/22/2019 08:20 AM    CO2 28 08/22/2019 08:20 AM    BUN 39 (H) 08/22/2019 08:20 AM    CREATININE 1.1 08/22/2019 08:20 AM    CALCIUM 8.8 08/22/2019 08:20 AM    MG 2.1 05/31/2019 10:25 AM     CMP:   Lab Results   Component Value Date/Time    GLU 83 08/22/2019 08:20 AM    CALCIUM 8.8 08/22/2019 08:20 AM    ALBUMIN 2.8 (L) 08/22/2019 08:20 AM    PROT 6.7 08/22/2019 08:20 AM     08/22/2019 08:20 AM    K 3.6 08/22/2019 08:20 AM    CO2 28 08/22/2019 08:20 AM     08/22/2019 08:20 AM    BUN 39 (H) 08/22/2019 08:20 AM    CREATININE 1.1 08/22/2019 08:20 AM    ALKPHOS 135 08/22/2019 08:20 AM    ALT 12 08/22/2019 08:20 AM    AST 14 08/22/2019 08:20 AM    BILITOT 0.5 08/22/2019 08:20 AM     LFTs:   Lab Results   Component Value Date/Time    ALT 12 08/22/2019 08:20 AM    AST 14 08/22/2019 08:20 AM    ALKPHOS 135 08/22/2019 08:20 AM    BILITOT 0.5 08/22/2019 08:20 AM    PROT 6.7 08/22/2019 08:20 AM    ALBUMIN 2.8 (L) 08/22/2019 08:20 AM     Coagulation:   Lab Results   Component Value Date/Time    INR 1.1 05/31/2019 10:25 AM    APTT 28.3 05/31/2019 10:25 AM       Imaging:  Ultrasound showed Ascites.    ASSESSMENT/PLAN:     Ascites.    Patient will undergo paracentesis.    Sedation/Anesthesia Assessment:  Sedation Plan: local

## 2019-10-13 RX ORDER — TORSEMIDE 20 MG/1
TABLET ORAL
Qty: 120 TABLET | Refills: 0 | Status: SHIPPED | OUTPATIENT
Start: 2019-10-13 | End: 2019-10-29 | Stop reason: SDUPTHER

## 2019-10-19 PROCEDURE — G0179 MD RECERTIFICATION HHA PT: HCPCS | Mod: ,,, | Performed by: SURGERY

## 2019-10-19 PROCEDURE — G0179 PR HOME HEALTH MD RECERTIFICATION: ICD-10-PCS | Mod: ,,, | Performed by: SURGERY

## 2019-10-22 ENCOUNTER — HOSPITAL ENCOUNTER (OUTPATIENT)
Dept: WOUND CARE | Facility: HOSPITAL | Age: 59
Discharge: HOME | End: 2019-10-22
Attending: FAMILY MEDICINE
Payer: COMMERCIAL

## 2019-10-22 DIAGNOSIS — I70.244 ATHEROSCLEROSIS OF NATIVE ARTERY OF LEFT LOWER EXTREMITY WITH ULCERATION OF MIDFOOT: Primary | ICD-10-CM

## 2019-10-22 PROCEDURE — 99214 PR OFFICE/OUTPT VISIT, EST, LEVL IV, 30-39 MIN: ICD-10-PCS | Mod: ,,, | Performed by: FAMILY MEDICINE

## 2019-10-22 PROCEDURE — 97598 DBRDMT OPN WND ADDL 20CM/<: CPT | Performed by: FAMILY MEDICINE

## 2019-10-22 PROCEDURE — 97597 DBRDMT OPN WND 1ST 20 CM/<: CPT | Performed by: FAMILY MEDICINE

## 2019-10-22 PROCEDURE — 27201912 HC WOUND CARE DEBRIDEMENT SUPPLIES: Performed by: FAMILY MEDICINE

## 2019-10-22 PROCEDURE — 99214 OFFICE O/P EST MOD 30 MIN: CPT | Mod: ,,, | Performed by: FAMILY MEDICINE

## 2019-10-23 DIAGNOSIS — E78.5 HYPERLIPIDEMIA LDL GOAL <70: ICD-10-CM

## 2019-10-24 RX ORDER — EZETIMIBE 10 MG/1
TABLET ORAL
Qty: 90 TABLET | Refills: 0 | Status: SHIPPED | OUTPATIENT
Start: 2019-10-24 | End: 2020-01-24

## 2019-10-25 ENCOUNTER — TELEPHONE (OUTPATIENT)
Dept: HOME HEALTH SERVICES | Facility: HOSPITAL | Age: 59
End: 2019-10-25

## 2019-10-28 ENCOUNTER — EXTERNAL HOME HEALTH (OUTPATIENT)
Dept: HOME HEALTH SERVICES | Facility: HOSPITAL | Age: 59
End: 2019-10-28
Payer: COMMERCIAL

## 2019-10-29 RX ORDER — TORSEMIDE 20 MG/1
TABLET ORAL
Qty: 120 TABLET | Refills: 0 | Status: SHIPPED | OUTPATIENT
Start: 2019-10-29 | End: 2019-11-16 | Stop reason: SDUPTHER

## 2019-10-30 ENCOUNTER — PATIENT OUTREACH (OUTPATIENT)
Dept: ADMINISTRATIVE | Facility: OTHER | Age: 59
End: 2019-10-30

## 2019-10-31 ENCOUNTER — HOSPITAL ENCOUNTER (OUTPATIENT)
Facility: HOSPITAL | Age: 59
Discharge: HOME | End: 2019-10-31
Attending: RADIOLOGY | Admitting: RADIOLOGY
Payer: COMMERCIAL

## 2019-10-31 ENCOUNTER — HOSPITAL ENCOUNTER (OUTPATIENT)
Dept: INTERVENTIONAL RADIOLOGY/VASCULAR | Facility: HOSPITAL | Age: 59
Discharge: HOME | End: 2019-10-31
Attending: FAMILY MEDICINE
Payer: COMMERCIAL

## 2019-10-31 VITALS
DIASTOLIC BLOOD PRESSURE: 56 MMHG | TEMPERATURE: 98 F | SYSTOLIC BLOOD PRESSURE: 118 MMHG | OXYGEN SATURATION: 97 % | RESPIRATION RATE: 16 BRPM | HEART RATE: 88 BPM

## 2019-10-31 DIAGNOSIS — R18.8 OTHER ASCITES: ICD-10-CM

## 2019-10-31 PROCEDURE — P9047 ALBUMIN (HUMAN), 25%, 50ML: HCPCS | Mod: JG | Performed by: RADIOLOGY

## 2019-10-31 PROCEDURE — 49083 IR PARACENTESIS WITH IMAGING: ICD-10-PCS | Mod: ,,, | Performed by: RADIOLOGY

## 2019-10-31 PROCEDURE — 63600175 PHARM REV CODE 636 W HCPCS: Mod: JG | Performed by: RADIOLOGY

## 2019-10-31 PROCEDURE — 49083 ABD PARACENTESIS W/IMAGING: CPT

## 2019-10-31 PROCEDURE — 49083 ABD PARACENTESIS W/IMAGING: CPT | Mod: ,,, | Performed by: RADIOLOGY

## 2019-10-31 RX ORDER — ALBUMIN HUMAN 250 G/1000ML
50 SOLUTION INTRAVENOUS ONCE AS NEEDED
Status: COMPLETED | OUTPATIENT
Start: 2019-10-31 | End: 2019-10-31

## 2019-10-31 RX ADMIN — ALBUMIN (HUMAN) 50 G: 25 SOLUTION INTRAVENOUS at 03:10

## 2019-10-31 NOTE — DISCHARGE SUMMARY
Radiology Discharge Summary      Hospital Course: No complications    Admit Date: 10/31/2019  Discharge Date: 10/31/2019     Instructions Given to Patient: Yes  Diet: Resume prior diet  Activity: activity as tolerated    Description of Condition on Discharge: Stable  Vital Signs (Most Recent):      Discharge Disposition: Home    Discharge Diagnosis:   CKD III and combined systolic/diastolic CHF complicated by recurrent abdominal distension and pain 2/2 recurrent painful, tense ascites s/p successful U/S-guided therapeutic LVP with local anesthetic only and albumin infusion post per protocol.      Patient tolerated the procedure well. No immediate post-procedural complications noted.      Thank you for considering IR for the care of your patient.      Zach Cha MD  Interventional Radiology

## 2019-10-31 NOTE — PLAN OF CARE
Pt verbalized readiness to go home and understanding of discharge instructions.    Refused handouts.

## 2019-10-31 NOTE — PROCEDURES
Radiology Post-Procedure Note     Pre Op Diagnosis: Recurrent painful, tense ascites  Post Op Diagnosis: Same     Procedure: U/S-guided therapeutic LVP     Procedure performed by: Zach Cha MD     Written Informed Consent Obtained: Yes  Specimen Removed: YES, 7.7-L of serous ascitic fluid  Estimated Blood Loss: none     Findings:   1. Successful therapeutic large-volume paracentesis with local anesthetic only and albumin infusion post per protocol. Patient tolerated the procedure well. No immediate post-procedural complications noted.      Thank you for considering IR for the care of your patient.     Zach Cha MD  Interventional Radiology

## 2019-10-31 NOTE — H&P
Radiology History & Physical      SUBJECTIVE:     Chief Complaint: Recurrent painful, tense ascites     History of Present Illness:  Marvin Ray is a 58 y.o. male with PMHx of CKD III and combined systolic/diastolic CHF complicated by recurrent abdominal distension and pain 2/2 recurrent painful, tense ascites requiring frequent large-volume therapeutic paracentesis.      Pt returns as outpatient with recurrence of abdominal distension and pain with +fluid wave on PE indicating recurrent ascites with outpatient request for image-guided therapeutic paracentesis.       Past Medical History:   Diagnosis Date    Arthritis     Cellulitis     CKD (chronic kidney disease), stage III     Coronary artery disease     Diabetes mellitus     Diabetic retinopathy     Diabetic ulcer of left foot     Glaucoma     Gout     Hyperlipemia     Hypertension     ICD (implantable cardioverter-defibrillator) in place 11/02/2018    Left chest    Non-pressure chronic ulcer of other part of left foot with fat layer exposed 10/23/2018    PVD (peripheral vascular disease)     Type 2 diabetes mellitus with left diabetic foot ulcer 10/29/2018    Unsteady gait     uses a wheelchair     Past Surgical History:   Procedure Laterality Date    AHMED GLAUCOMA IMPLANT Left 2011    DONE AT [facility]    AORTOGRAPHY WITH SERIALOGRAPHY Left 4/30/2019    Procedure: AORTOGRAM, WITH SERIALOGRAPHY, LEFT LOWER EXTREMITY, POSSIBLE INTERVENTION;  Surgeon: Mitch Chan MD;  Location: [facility];  Service: Vascular;  Laterality: Left;  RN PRE OP 4-23-19.  NO H & P, No Cosent  [place]    BAERVELDT GLAUCOMA IMPLANT Left 2012    WITH CATARACT EXTRACTION//DONE AT [facility]    CARDIAC CATHETERIZATION Left 05/2016    CARDIAC DEFIBRILLATOR PLACEMENT Left 11/02/2018    CATARACT EXTRACTION W/  INTRAOCULAR LENS IMPLANT Left 2012    WITH BAERVEDT//DONE AT [facility]    CATARACT EXTRACTION W/  INTRAOCULAR LENS IMPLANT Right 09/26/2018    COMPLEX  ()    CB DESTRUCTION WITH CYCLO G6 Left 02/15/2017        CYST REMOVAL      DEBRIDEMENT OF FOOT  12/28/2018    Procedure: DEBRIDEMENT, FOOT;  Surgeon: Mitch Wall MD;  Location: [facility] OR;  Service: Vascular;;    DEBRIDEMENT OF FOOT  6/5/2019    Procedure: DEBRIDEMENT, FOOT;  Surgeon: Mitch Wall MD;  Location: [facility] OR;  Service: Vascular;;    EXAMINATION UNDER ANESTHESIA Left 12/5/2018    Procedure: Exam under anesthesia, left foot debridement, washout and all other indicated procedures;  Surgeon: Mitch Wall MD;  Location: [facility] OR;  Service: Vascular;  Laterality: Left;  1030AM START  RN PREOP 12/3/2018-----AICD  SEEN BY DR JAMES 12/4    EXAMINATION UNDER ANESTHESIA Left 2/13/2019    Procedure: LEFT FOOT WOUND DEBRIDEMENT, WASHOUT AND ALL OTHER INDICATED PROCEDURES;  Surgeon: Mitch Wall MD;  Location: [facility] OR;  Service: Vascular;  Laterality: Left;  requesting 1st case start  THIS CASE 1ST PER DR WALL ON 2/1/19 @ 844AM -  RN PREOP 2/6/2019  HAS CARDIAC CLEARANCE    EXAMINATION UNDER ANESTHESIA Left 12/28/2018    Procedure: Exam under anesthesia, left foot debridement, left foot washout, possible left second through fifth metatarsal resection;  Surgeon: Mitch Wall MD;  Location: [facility] OR;  Service: Vascular;  Laterality: Left;  1:00pm start per julissa @ 8:58am  RN  PHONE PRE OP 12-27-18--=Pt coming from [facility] [address] [facility]  NEED CONSENT    EXAMINATION UNDER ANESTHESIA Left 6/5/2019    Procedure: LEFT FOOT DEBRIDEMENT, WASHOUT AND ALL OTHER INDICATED PROCEDURES;  Surgeon: Mitch Wall MD;  Location: [facility] OR;  Service: Vascular;  Laterality: Left;  RN PRE OP 5-31-19------ICD------NEED CONSENT    INCISION AND DRAINAGE Left 11/14/2018    Procedure: Incision and Drainage, left lower extremity debridement, washout;  Surgeon: Mitch Wall MD;  Location: [facility] OR;  Service: Vascular;  Laterality: Left;    INCISION AND  DRAINAGE Left 11/16/2018    Procedure: INCISION AND DRAINAGE;  Surgeon: Mitch Chan MD;  Location: [facility] OR;  Service: Vascular;  Laterality: Left;    INTRAOCULAR PROSTHESES INSERTION Right 9/26/2018    Procedure: INSERTION, IOL PROSTHESIS;  Surgeon: Perla Cortés MD;  Location: [facility] OR 1ST FLR;  Service: Ophthalmology;  Laterality: Right;    PERITONEOCENTESIS N/A 3/1/2019    Procedure: PARACENTESIS, ABDOMINAL, INITIAL;  Surgeon: Dosc Diagnostic Provider;  Location: [facility] OR;  Service: Radiology;  Laterality: N/A;    PERITONEOCENTESIS N/A 3/25/2019    Procedure: PARACENTESIS, ABDOMINAL, INITIAL;  Surgeon: Dosc Diagnostic Provider;  Location: [facility] OR;  Service: Radiology;  Laterality: N/A;    PERITONEOCENTESIS N/A 7/22/2019    Procedure: PARACENTESIS, ABDOMINAL, INITIAL;  Surgeon: Dosc Diagnostic Provider;  Location: [facility] OR;  Service: Radiology;  Laterality: N/A;    PERITONEOCENTESIS N/A 8/16/2019    Procedure: PARACENTESIS, ABDOMINAL, INITIAL;  Surgeon: Dosc Diagnostic Provider;  Location: [facility] OR;  Service: Radiology;  Laterality: N/A;    PERITONEOCENTESIS N/A 9/26/2019    Procedure: PARACENTESIS, ABDOMINAL;  Surgeon: Dosc Diagnostic Provider;  Location: [facility] OR;  Service: Radiology;  Laterality: N/A;    PERITONEOCENTESIS N/A 10/11/2019    Procedure: PARACENTESIS, ABDOMINAL;  Surgeon: Dosc Diagnostic Provider;  Location: [facility] OR;  Service: Radiology;  Laterality: N/A;    PHACOEMULSIFICATION OF CATARACT Right 9/26/2018    Procedure: PHACOEMULSIFICATION, CATARACT;  Surgeon: Perla Cortés MD;  Location: [facility] OR 1ST FLR;  Service: Ophthalmology;  Laterality: Right;    REPEAT ABDOMINAL PARACENTESIS N/A 2/11/2019    Procedure: PARACENTESIS, ABDOMINAL, REPEAT;  Surgeon: Dosc Diagnostic Provider;  Location: [facility] OR;  Service: Radiology;  Laterality: N/A;  1PM START    REPEAT ABDOMINAL PARACENTESIS N/A 9/12/2019    Procedure: PARACENTESIS, ABDOMINAL, REPEAT;  Surgeon: Dosc Diagnostic  Provider;  Location: [facility] OR;  Service: Radiology;  Laterality: N/A;  9AM START    REVISION OF PROCEDURE INVOLVING GLAUCOMA DRAINAGE DEVICE Left 10/2/2019    EXTRUDING BAERVELDT /WITH PERICARDIAL PATCH GRAFT ()    Right foot surgery  10/2014    TOE AMPUTATION Right     first and second    TOE AMPUTATION Left 11/16/2018    Procedure: AMPUTATION, TOES  2-5;  Surgeon: Mitch Chan MD;  Location: [facility] OR;  Service: Vascular;  Laterality: Left;    TOE AMPUTATION Left 12/5/2018    Procedure: AMPUTATION, TOE;  Surgeon: Mitch Chan MD;  Location: [facility] OR;  Service: Vascular;  Laterality: Left;  left great toe    TONSILLECTOMY         Home Meds:   Prior to Admission medications    Medication Sig Start Date End Date Taking? Authorizing Provider   allopurinol (ZYLOPRIM) 300 MG tablet Take 1 tablet (300 mg total) by mouth once daily. 9/27/19   Rubén Davis MD   aspirin 325 MG tablet Take 325 mg by mouth once daily.     Historical Provider, MD   bisacodyl (DULCOLAX) 5 mg EC tablet Take 5 mg by mouth daily as needed for Constipation.    Historical Provider, MD   brimonidine 0.2% (ALPHAGAN) 0.2 % Drop Place 1 drop into both eyes 2 (two) times daily. 2/8/19   Perla Cortés MD   carvedilol (COREG) 3.125 MG tablet Take 1 tablet (3.125 mg total) by mouth 2 (two) times daily. 1/23/19 1/23/20  Sara Ching MD   coenzyme Q10 100 mg capsule Take 100 mg by mouth every morning.    Historical Provider, MD   dorzolamide-timolol 2-0.5% (COSOPT) 22.3-6.8 mg/mL ophthalmic solution Place 1 drop into both eyes 2 (two) times daily. 2/8/19 2/8/20  Perla Cortés MD   doxycycline (VIBRAMYCIN) 100 MG Cap Take 1 capsule (100 mg total) by mouth every 12 (twelve) hours. 8/6/19   Leondias Diaz MD   ezetimibe (ZETIA) 10 mg tablet TAKE 1 TABLET(10 MG) BY MOUTH EVERY DAY 10/24/19   Rubén Davis MD   fish oil-omega-3 fatty acids 300-1,000 mg capsule Take 1 capsule by mouth 2 (two) times daily.  "1/23/19   Sara Ching MD   insulin aspart U-100 (NOVOLOG) 100 unit/mL InPn pen Use on premeal readings: 150-200=+2, 201-250=+4; 251-300=+6; 301-350=+8, over 350=+10 units 1/31/19   Sheryl Madison NP   insulin degludec-liraglutide (XULTOPHY 100/3.6) 100 unit-3.6 mg /mL (3 mL) InPn Inject 42 Units into the skin once daily. 2/6/19   Sheryl Madison NP   MULTIVIT,THER IRON,CA,FA & MIN (MULTIVITAMIN) Tab Take 1 tablet by mouth every morning.     Historical Provider, MD   pen needle, diabetic 31 gauge x 1/4" Ndle Use as directed 8/11/16   Mike Bass MD   prednisoLONE acetate (PRED FORTE) 1 % DrpS Place 1 drop into the left eye every morning. 10/10/19   Perla Cortés MD   torsemide (DEMADEX) 20 MG Tab TAKE 4 TABLETS BY MOUTH TWICE DAILY 10/29/19   Rubén Davis MD     Anticoagulants/Antiplatelets: no anticoagulation    Allergies:   Review of patient's allergies indicates:   Allergen Reactions    Statins-hmg-coa reductase inhibitors      Generalized Pain    Onglyza [saxagliptin]     Penicillins Rash     Sedation History:  no adverse reactions     Review of Systems:   Hematological: no known coagulopathies  Respiratory: no cough, shortness of breath, or wheezing  Cardiovascular: no chest pain or dyspnea on exertion  Gastrointestinal: no abdominal pain, change in bowel habits, or black or bloody stools  Genito-Urinary: no dysuria, trouble voiding, or hematuria  Musculoskeletal: negative  Neurological: no TIA or stroke symptoms       OBJECTIVE:     Vital Signs (Most Recent)     Physical Exam:  ASA: III  Mallampati: III     General: no acute distress  Mental Status: alert and oriented to person, place and time  HEENT: normocephalic, atraumatic  Chest: mild labored breathing  Heart: regular heart rate  Abdomen: nontender. +distended  Extremity: moves all extremities    Laboratory  Lab Results   Component Value Date    INR 1.1 05/31/2019       Lab Results   Component Value Date    WBC 5.13 08/22/2019    HGB " 9.9 (L) 08/22/2019    HCT 32.0 (L) 08/22/2019    MCV 86 08/22/2019     08/22/2019      Lab Results   Component Value Date    GLU 83 08/22/2019     08/22/2019    K 3.6 08/22/2019     08/22/2019    CO2 28 08/22/2019    BUN 39 (H) 08/22/2019    CREATININE 1.1 08/22/2019    CALCIUM 8.8 08/22/2019    MG 2.1 05/31/2019    ALT 12 08/22/2019    AST 14 08/22/2019    ALBUMIN 2.8 (L) 08/22/2019    BILITOT 0.5 08/22/2019       ASSESSMENT/PLAN:     57 y/o M with CKD III and combined systolic/diastolic CHF complicated by recurrent abdominal distension and pain 2/2 recurrent painful, tense ascites requiring frequent large-volume therapeutic paracentesis.      1. Will attempt U/S-guided therapeutic paracentesis with local anesthetic only and albumin infusion post PRN per protocol.      Risks (including, but not limited to, pain, bleeding, infection, damage to nearby structures, failure to obtain sufficient material for a diagnosis, the need for additional procedures, and death), benefits, and alternatives were discussed with the patient. All questions were answered to the best of my abilities. The patient wishes to proceed with the procedure. Written informed consent was obtained.     Thank you for considering IR for the care of your patient.      Zach Cha MD  Interventional Radiology

## 2019-11-01 DIAGNOSIS — E11.9 TYPE 2 DIABETES MELLITUS WITHOUT COMPLICATION: ICD-10-CM

## 2019-11-04 ENCOUNTER — OFFICE VISIT (OUTPATIENT)
Dept: VASCULAR SURGERY | Facility: CLINIC | Age: 59
End: 2019-11-04
Payer: COMMERCIAL

## 2019-11-04 ENCOUNTER — HOSPITAL ENCOUNTER (OUTPATIENT)
Dept: CARDIOLOGY | Facility: HOSPITAL | Age: 59
Discharge: HOME | End: 2019-11-04
Attending: SURGERY
Payer: COMMERCIAL

## 2019-11-04 VITALS
HEIGHT: 70 IN | DIASTOLIC BLOOD PRESSURE: 68 MMHG | SYSTOLIC BLOOD PRESSURE: 114 MMHG | WEIGHT: 201.06 LBS | BODY MASS INDEX: 28.78 KG/M2 | HEART RATE: 87 BPM

## 2019-11-04 DIAGNOSIS — I70.244 ATHEROSCLEROSIS OF NATIVE ARTERY OF LEFT LOWER EXTREMITY WITH ULCERATION OF MIDFOOT: ICD-10-CM

## 2019-11-04 DIAGNOSIS — I70.245 ATHEROSCLEROSIS OF NATIVE ARTERY OF LEFT LOWER EXTREMITY WITH ULCERATION OF OTHER PART OF FOOT: Primary | ICD-10-CM

## 2019-11-04 PROCEDURE — 99214 PR OFFICE/OUTPT VISIT, EST, LEVL IV, 30-39 MIN: ICD-10-PCS | Mod: S$GLB,,, | Performed by: SURGERY

## 2019-11-04 PROCEDURE — 3008F PR BODY MASS INDEX (BMI) DOCUMENTED: ICD-10-PCS | Mod: CPTII,S$GLB,, | Performed by: SURGERY

## 2019-11-04 PROCEDURE — 99999 PR PBB SHADOW E&M-EST. PATIENT-LVL III: CPT | Mod: PBBFAC,,, | Performed by: SURGERY

## 2019-11-04 PROCEDURE — 3078F DIAST BP <80 MM HG: CPT | Mod: CPTII,S$GLB,, | Performed by: SURGERY

## 2019-11-04 PROCEDURE — 99999 PR PBB SHADOW E&M-EST. PATIENT-LVL III: ICD-10-PCS | Mod: PBBFAC,,, | Performed by: SURGERY

## 2019-11-04 PROCEDURE — 3074F PR MOST RECENT SYSTOLIC BLOOD PRESSURE < 130 MM HG: ICD-10-PCS | Mod: CPTII,S$GLB,, | Performed by: SURGERY

## 2019-11-04 PROCEDURE — 93925 LOWER EXTREMITY STUDY: CPT | Mod: 50

## 2019-11-04 PROCEDURE — 3074F SYST BP LT 130 MM HG: CPT | Mod: CPTII,S$GLB,, | Performed by: SURGERY

## 2019-11-04 PROCEDURE — 93922 UPR/L XTREMITY ART 2 LEVELS: CPT | Mod: 26,,, | Performed by: SURGERY

## 2019-11-04 PROCEDURE — 3078F PR MOST RECENT DIASTOLIC BLOOD PRESSURE < 80 MM HG: ICD-10-PCS | Mod: CPTII,S$GLB,, | Performed by: SURGERY

## 2019-11-04 PROCEDURE — 99214 OFFICE O/P EST MOD 30 MIN: CPT | Mod: S$GLB,,, | Performed by: SURGERY

## 2019-11-04 PROCEDURE — 93922 UPR/L XTREMITY ART 2 LEVELS: CPT | Mod: 50

## 2019-11-04 PROCEDURE — 93922 CV US ANKLE BRACHIAL INDICES RESTING (CUPID ONLY): ICD-10-PCS | Mod: 26,,, | Performed by: SURGERY

## 2019-11-04 PROCEDURE — 93925 LOWER EXTREMITY STUDY: CPT | Mod: 26,,, | Performed by: SURGERY

## 2019-11-04 PROCEDURE — 93925 CV US DOPPLER ARTERIAL LEGS BILATERAL (CUPID ONLY): ICD-10-PCS | Mod: 26,,, | Performed by: SURGERY

## 2019-11-04 PROCEDURE — 3008F BODY MASS INDEX DOCD: CPT | Mod: CPTII,S$GLB,, | Performed by: SURGERY

## 2019-11-04 NOTE — LETTER
November 4, 2019        Rubén Davis MD  [address] [facility] [address]             [facility]  [address]  [facility] [address]  Phone: [phone]  Fax: [phone]   Patient: Marvin Ray   MR Number: [id number]   YOB: 1960   Date of Visit: 11/4/2019       Dear Dr. Davis:    Thank you for referring Marvin Ray to me for evaluation. Below are the relevant portions of my assessment and plan of care.            If you have questions, please do not hesitate to call me. I look forward to following Marvin along with you.    Sincerely,      Mitch Chan MD           CC  No Recipients

## 2019-11-04 NOTE — PROGRESS NOTES
Mitch Chan MD [facility]                         11/04/2019    HPI:  Marvin Ray is a 59 y.o. male with   Patient Active Problem List   Diagnosis    Epiretinal membrane, both eyes    Nuclear sclerotic cataract of right eye    Pseudophakia, left eye    Ptosis, left eyelid    DM type 2 with diabetic peripheral neuropathy    HLD (hyperlipidemia)    Neovascular glaucoma of both eyes    Tophaceous gout    Essential hypertension    Coronary artery disease involving native coronary artery of native heart without angina pectoris    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 edema, with long-term current use of insulin    Neovascular glaucoma of left eye, severe stage    Statin myopathy    Neovascular glaucoma, right eye, mild stage    Left leg cellulitis    PVD (peripheral vascular disease)    Right wrist pain    Multiple open wounds of lower leg    Hepatosplenomegaly    Type 2 diabetes mellitus with left diabetic foot ulcer    Open wound of left foot    Cellulitis of left lower extremity    Diabetic foot infection    Other ascites    Severe malnutrition    Goals of care, counseling/discussion    Alteration in skin integrity    MDR Acinetobacter baumannii infection    Takes dietary supplements    Intertriginous dermatitis associated with moisture    Debility    Infection due to multidrug-resistant Pseudomonas aeruginosa    Subacute osteomyelitis of left foot    Ascites    Acute on chronic combined systolic and diastolic congestive heart failure    Stage 3 chronic kidney disease    Atherosclerosis of left lower extremity with ulceration of midfoot    CKD stage 3 due to type 2 diabetes mellitus    Azotemia    Hyponatremia    Anemia due to multiple mechanisms    Persistent proteinuria    Hypokalemia    Open wound of foot, left, subsequent encounter    Disorder  due to insertion of glaucoma drainage device    being managed by PCP and specialists who is here today for evaluation of L foot wound.  Seen in hospital last mo with LLE cellulitis.  Treated with Abx.  Also had paracentesis for ascites with negative w/u per family.  Patient states location is L foot occurring for 1-2 mo, worsening foot wound although improvement in edema and erythema.  Associated signs and symptoms include pain and edema.  Quality is aching and severity is 5/10.  Symptoms began 10/2018 spontaneously with blistering and severe edema.  Alleviating factors include wound care and elevation.  Worsening factors include pressure.    Tobacco use: denies    1/4/19: s/p LLE angioplasty and multiple subsequent OR and bedside debridements.  On IV Abx per culture results.  Glucose better controlled.  No fevers.  Receiving local wound care with Santyl.    1/14/19:  Cont to receive local wound care.  Pseudomonas in tissue and bone cultures multidrug resistant other than aminoglycoside; d/w Dr. Velez with high risk of ESRD with 6 weeks of aminoglycoside treatment.  No F/C.    1/31/19:  Pt without complaints.  Was started back on Bactrim.  Family doing wound care.    2/28/19: S/p L foot debridement 2/13/19.  Started on Meropenem.    3/28/19:  No complaints.  S/p paracentesis and pt feels better.    5/16/19:  S/p L peroneal and AT angioplasty, wound healing well.    9/30/19:  S/p L foot debridement 6/5/19. WV placed.    Interval history:  States wound is healing well.  Did not see Plastic Surgery clinic.  Cont wound care center and home health wound care.    Past Medical History:   Diagnosis Date    Arthritis     Cellulitis     CKD (chronic kidney disease), stage III     Coronary artery disease     Diabetes mellitus     Diabetic retinopathy     Diabetic ulcer of left foot     Glaucoma     Gout     Hyperlipemia     Hypertension     ICD (implantable cardioverter-defibrillator) in place 11/02/2018    Left  chest    Non-pressure chronic ulcer of other part of left foot with fat layer exposed 10/23/2018    PVD (peripheral vascular disease)     Type 2 diabetes mellitus with left diabetic foot ulcer 10/29/2018    Unsteady gait     uses a wheelchair     Past Surgical History:   Procedure Laterality Date    AHMED GLAUCOMA IMPLANT Left 2011    DONE AT [facility]    AORTOGRAPHY WITH SERIALOGRAPHY Left 4/30/2019    Procedure: AORTOGRAM, WITH SERIALOGRAPHY, LEFT LOWER EXTREMITY, POSSIBLE INTERVENTION;  Surgeon: Mitch Chan MD;  Location: [facility] OR;  Service: Vascular;  Laterality: Left;  RN PRE OP 4-23-19.  NO H & P, No Cosent  [place]    BAERVELDT GLAUCOMA IMPLANT Left 2012    WITH CATARACT EXTRACTION//DONE AT [facility]    CARDIAC CATHETERIZATION Left 05/2016    CARDIAC DEFIBRILLATOR PLACEMENT Left 11/02/2018    CATARACT EXTRACTION W/  INTRAOCULAR LENS IMPLANT Left 2012    WITH BAERVEDT//DONE AT [facility]    CATARACT EXTRACTION W/  INTRAOCULAR LENS IMPLANT Right 09/26/2018    COMPLEX ()    CB DESTRUCTION WITH CYCLO G6 Left 02/15/2017        CYST REMOVAL      DEBRIDEMENT OF FOOT  12/28/2018    Procedure: DEBRIDEMENT, FOOT;  Surgeon: Mitch Chan MD;  Location: [facility] OR;  Service: Vascular;;    DEBRIDEMENT OF FOOT  6/5/2019    Procedure: DEBRIDEMENT, FOOT;  Surgeon: Mitch Chan MD;  Location: [facility] OR;  Service: Vascular;;    EXAMINATION UNDER ANESTHESIA Left 12/5/2018    Procedure: Exam under anesthesia, left foot debridement, washout and all other indicated procedures;  Surgeon: Mitch Chan MD;  Location: [facility] OR;  Service: Vascular;  Laterality: Left;  1030AM START  RN PREOP 12/3/2018-----AICD  SEEN BY DR JAMES 12/4    EXAMINATION UNDER ANESTHESIA Left 2/13/2019    Procedure: LEFT FOOT WOUND DEBRIDEMENT, WASHOUT AND ALL OTHER INDICATED PROCEDURES;  Surgeon: Mitch Chan MD;  Location: [facility] OR;  Service: Vascular;  Laterality: Left;  requesting 1st case  start  THIS CASE 1ST PER DR WALL ON 2/1/19 @ 844AM -[place]  RN PREOP 2/6/2019  HAS CARDIAC CLEARANCE    EXAMINATION UNDER ANESTHESIA Left 12/28/2018    Procedure: Exam under anesthesia, left foot debridement, left foot washout, possible left second through fifth metatarsal resection;  Surgeon: Mitch Wall MD;  Location: [facility] OR;  Service: Vascular;  Laterality: Left;  1:00pm start per julissa @ 8:58am  RN  PHONE PRE OP 12-27-18--=Pt coming [facility]  NEED CONSENT    EXAMINATION UNDER ANESTHESIA Left 6/5/2019    Procedure: LEFT FOOT DEBRIDEMENT, WASHOUT AND ALL OTHER INDICATED PROCEDURES;  Surgeon: Mitch Wall MD;  Location: [facility] OR;  Service: Vascular;  Laterality: Left;  RN PRE OP 5-31-19------ICD------NEED CONSENT    INCISION AND DRAINAGE Left 11/14/2018    Procedure: Incision and Drainage, left lower extremity debridement, washout;  Surgeon: Mitch Wall MD;  Location: [facility] OR;  Service: Vascular;  Laterality: Left;    INCISION AND DRAINAGE Left 11/16/2018    Procedure: INCISION AND DRAINAGE;  Surgeon: Mitch Wall MD;  Location: [facility] OR;  Service: Vascular;  Laterality: Left;    INTRAOCULAR PROSTHESES INSERTION Right 9/26/2018    Procedure: INSERTION, IOL PROSTHESIS;  Surgeon: Perla Cortés MD;  Location: [facility] OR [address] [place] [address];  Service: Ophthalmology;  Laterality: Right;    PERITONEOCENTESIS N/A 3/1/2019    Procedure: PARACENTESIS, ABDOMINAL, INITIAL;  Surgeon: Dosc Diagnostic Provider;  Location: [facility] OR;  Service: Radiology;  Laterality: N/A;    PERITONEOCENTESIS N/A 3/25/2019    Procedure: PARACENTESIS, ABDOMINAL, INITIAL;  Surgeon: Dosc Diagnostic Provider;  Location: [facility] OR;  Service: Radiology;  Laterality: N/A;    PERITONEOCENTESIS N/A 7/22/2019    Procedure: PARACENTESIS, ABDOMINAL, INITIAL;  Surgeon: Dosc Diagnostic Provider;  Location: [facility] OR;  Service: Radiology;  Laterality: N/A;    PERITONEOCENTESIS N/A 8/16/2019    Procedure: PARACENTESIS,  ABDOMINAL, INITIAL;  Surgeon: [facility] Diagnostic Provider;  Location: [facility] OR;  Service: Radiology;  Laterality: N/A;    PERITONEOCENTESIS N/A 9/26/2019    Procedure: PARACENTESIS, ABDOMINAL;  Surgeon: Dos Diagnostic Provider;  Location: [facility] OR;  Service: Radiology;  Laterality: N/A;    PERITONEOCENTESIS N/A 10/11/2019    Procedure: PARACENTESIS, ABDOMINAL;  Surgeon: Dos Diagnostic Provider;  Location: [facility] OR;  Service: Radiology;  Laterality: N/A;    PERITONEOCENTESIS N/A 10/31/2019    Procedure: PARACENTESIS, ABDOMINAL;  Surgeon: [facility] Diagnostic Provider;  Location: [facility] OR;  Service: Radiology;  Laterality: N/A;    PHACOEMULSIFICATION OF CATARACT Right 9/26/2018    Procedure: PHACOEMULSIFICATION, CATARACT;  Surgeon: Perla Cortés MD;  Location: [facility] OR [address] [place] [address];  Service: Ophthalmology;  Laterality: Right;    REPEAT ABDOMINAL PARACENTESIS N/A 2/11/2019    Procedure: PARACENTESIS, ABDOMINAL, REPEAT;  Surgeon: [facility] Diagnostic Provider;  Location: [facility] OR;  Service: Radiology;  Laterality: N/A;  1PM START    REPEAT ABDOMINAL PARACENTESIS N/A 9/12/2019    Procedure: PARACENTESIS, ABDOMINAL, REPEAT;  Surgeon: [facility] Diagnostic Provider;  Location: [facility] OR;  Service: Radiology;  Laterality: N/A;  9AM START    REVISION OF PROCEDURE INVOLVING GLAUCOMA DRAINAGE DEVICE Left 10/2/2019    EXTRUDING BAERVELDT /WITH PERICARDIAL PATCH GRAFT ()    Right foot surgery  10/2014    TOE AMPUTATION Right     first and second    TOE AMPUTATION Left 11/16/2018    Procedure: AMPUTATION, TOES  2-5;  Surgeon: Mitch Chan MD;  Location: [facility] OR;  Service: Vascular;  Laterality: Left;    TOE AMPUTATION Left 12/5/2018    Procedure: AMPUTATION, TOE;  Surgeon: Mitch Chan MD;  Location: [facility] OR;  Service: Vascular;  Laterality: Left;  left great toe    TONSILLECTOMY       Family History   Problem Relation Age of Onset    Diabetes Mother     Heart disease Brother     No Known Problems Father      No Known Problems Sister     No Known Problems Maternal Aunt     No Known Problems Maternal Uncle     No Known Problems Paternal Aunt     No Known Problems Paternal Uncle     No Known Problems Maternal Grandmother     No Known Problems Maternal Grandfather     No Known Problems Paternal Grandmother     No Known Problems Paternal Grandfather     Anemia Neg Hx     Arrhythmia Neg Hx     Asthma Neg Hx     Clotting disorder Neg Hx     Fainting Neg Hx     Heart attack Neg Hx     Heart failure Neg Hx     Hyperlipidemia Neg Hx     Hypertension Neg Hx     Stroke Neg Hx     Atrial Septal Defect Neg Hx     Amblyopia Neg Hx     Blindness Neg Hx     Glaucoma Neg Hx     Macular degeneration Neg Hx     Retinal detachment Neg Hx     Strabismus Neg Hx      Social History     Socioeconomic History    Marital status: Single     Spouse name: Not on file    Number of children: Not on file    Years of education: 14    Highest education level: Not on file   Occupational History    Not on file   Social Needs    Financial resource strain: Not on file    Food insecurity:     Worry: Not on file     Inability: Not on file    Transportation needs:     Medical: Not on file     Non-medical: Not on file   Tobacco Use    Smoking status: Former Smoker     Packs/day: 1.00     Years: 3.00     Pack years: 3.00     Types: Cigarettes     Last attempt to quit: 1984     Years since quittin.3    Smokeless tobacco: Never Used   Substance and Sexual Activity    Alcohol use: Not Currently     Alcohol/week: 1.0 standard drinks     Types: 1 Cans of beer per week     Comment: rarely    Drug use: No    Sexual activity: Not Currently     Partners: Female   Lifestyle    Physical activity:     Days per week: Not on file     Minutes per session: Not on file    Stress: Not on file   Relationships    Social connections:     Talks on phone: Not on file     Gets together: Not on file     Attends [employer] service: Not on  "file     Active member of club or organization: Not on file     Attends meetings of clubs or organizations: Not on file     Relationship status: Not on file   Other Topics Concern    Not on file   Social History Narrative    Not on file       Current Outpatient Medications:     allopurinol (ZYLOPRIM) 300 MG tablet, Take 1 tablet (300 mg total) by mouth once daily., Disp: 30 tablet, Rfl: 3    aspirin 325 MG tablet, Take 325 mg by mouth once daily. , Disp: , Rfl:     bisacodyl (DULCOLAX) 5 mg EC tablet, Take 5 mg by mouth daily as needed for Constipation., Disp: , Rfl:     brimonidine 0.2% (ALPHAGAN) 0.2 % Drop, Place 1 drop into both eyes 2 (two) times daily., Disp: 10 mL, Rfl: 11    carvedilol (COREG) 3.125 MG tablet, Take 1 tablet (3.125 mg total) by mouth 2 (two) times daily., Disp: 60 tablet, Rfl: 11    coenzyme Q10 100 mg capsule, Take 100 mg by mouth every morning., Disp: , Rfl:     dorzolamide-timolol 2-0.5% (COSOPT) 22.3-6.8 mg/mL ophthalmic solution, Place 1 drop into both eyes 2 (two) times daily., Disp: 1 Bottle, Rfl: 11    doxycycline (VIBRAMYCIN) 100 MG Cap, Take 1 capsule (100 mg total) by mouth every 12 (twelve) hours., Disp: 14 capsule, Rfl: 0    ezetimibe (ZETIA) 10 mg tablet, TAKE 1 TABLET(10 MG) BY MOUTH EVERY DAY, Disp: 90 tablet, Rfl: 0    fish oil-omega-3 fatty acids 300-1,000 mg capsule, Take 1 capsule by mouth 2 (two) times daily., Disp: 60 capsule, Rfl: 3    insulin aspart U-100 (NOVOLOG) 100 unit/mL InPn pen, Use on premeal readings: 150-200=+2, 201-250=+4; 251-300=+6; 301-350=+8, over 350=+10 units, Disp: 2.7 mL, Rfl: 11    insulin degludec-liraglutide (XULTOPHY 100/3.6) 100 unit-3.6 mg /mL (3 mL) InPn, Inject 42 Units into the skin once daily., Disp: 5 Syringe, Rfl: 5    MULTIVIT,THER IRON,CA,FA & MIN (MULTIVITAMIN) Tab, Take 1 tablet by mouth every morning. , Disp: , Rfl:     pen needle, diabetic 31 gauge x 1/4" Ndle, Use as directed, Disp: 100 each, Rfl: 11    " prednisoLONE acetate (PRED FORTE) 1 % DrpS, Place 1 drop into the left eye every morning., Disp: 1 Bottle, Rfl: 0    torsemide (DEMADEX) 20 MG Tab, TAKE 4 TABLETS BY MOUTH TWICE DAILY, Disp: 120 tablet, Rfl: 0  No current facility-administered medications for this visit.     Facility-Administered Medications Ordered in Other Visits:     clindamycin 900 MG/50 ML D5W 900 mg/50 mL IVPB 900 mg, 900 mg, Intravenous, Q8H, Mitch Chan MD, 900 mg at 06/05/19 1407    lactated ringers infusion, , Intravenous, Continuous, Tam Reynolds MD    lidocaine (PF) 10 mg/ml (1%) injection 10 mg, 1 mL, Intradermal, Once, Tam Reynolds MD    REVIEW OF SYSTEMS:  General: No fevers or chills; ENT: No sore throat; Allergy and Immunology: no persistent infections; Hematological and Lymphatic: No history of bleeding or easy bruising; Endocrine: negative; Respiratory: no cough, shortness of breath, or wheezing; Cardiovascular: no chest pain or dyspnea on exertion; Gastrointestinal: no abdominal pain/back, change in bowel habits, or bloody stools; Genito-Urinary: no dysuria, trouble voiding, or hematuria; Musculoskeletal: negative; Neurological: no TIA or stroke symptoms; Psychiatric: no nervousness, anxiety or depression.    PHYSICAL EXAM:      Pulse: 87         General appearance:  Alert, well-appearing, and in no distress.  Oriented to person, place, and time                    Neurological: Normal speech, no focal findings noted; CN II - XII grossly intact. RLE with sensation to light touch, LLE with sensation to light touch.            Musculoskeletal: Digits/nail without cyanosis/clubbing.  Strength 5/5 BLE.                    Neck: Supple, no significant adenopathy                  Chest:  Clear to auscultation, no wheezes, rales or rhonchi, symmetric air entry. No use of accessory muscles               Cardiac: Normal rate and regular rhythm, S1 and S2 normal            Abdomen: Soft, nontender, nondistended, no masses  or organomegaly, no hernia     No rebound tenderness noted; bowel sounds normal     No groin adenopathy      Extremities:   2+ R femoral pulse, 2+ L femoral pulse     doppler+ R popliteal pulse, doppler+ L popliteal pulse     doppler+ R PT pulse, doppler+ L PT pulse     doppler+ R DP pulse, doppler+ L DP pulse     1+ RLE edema, 2+ LLE edema    Skin: LLE with minimal brawny edema, improved/smaller foot wound with markedly improved granulation tissue, no odor, no purulence or erythema    LAB RESULTS:  No results found for: CBC  Lab Results   Component Value Date    LABPROT 11.2 05/31/2019    INR 1.1 05/31/2019     Lab Results   Component Value Date     08/22/2019    K 3.6 08/22/2019     08/22/2019    CO2 28 08/22/2019    GLU 83 08/22/2019    BUN 39 (H) 08/22/2019    CREATININE 1.1 08/22/2019    CALCIUM 8.8 08/22/2019    ANIONGAP 8 08/22/2019    EGFRNONAA >60 08/22/2019     Lab Results   Component Value Date    WBC 5.13 08/22/2019    RBC 3.73 (L) 08/22/2019    HGB 9.9 (L) 08/22/2019    HCT 32.0 (L) 08/22/2019    MCV 86 08/22/2019    MCH 26.5 (L) 08/22/2019    MCHC 30.9 (L) 08/22/2019    RDW 19.1 (H) 08/22/2019     08/22/2019    MPV 8.7 (L) 08/22/2019    GRAN 3.6 08/22/2019    GRAN 69.8 08/22/2019    LYMPH 0.5 (L) 08/22/2019    LYMPH 10.1 (L) 08/22/2019    MONO 0.7 08/22/2019    MONO 13.1 08/22/2019    EOS 0.3 08/22/2019    BASO 0.05 08/22/2019    EOSINOPHIL 6.0 08/22/2019    BASOPHIL 1.0 08/22/2019    DIFFMETHOD Automated 08/22/2019     .  Lab Results   Component Value Date    HGBA1C 6.0 (H) 07/22/2019       IMAGING:  All pertinent imaging has been reviewed and interpreted independently.    BLE arterial US 1/2019: No focal stenosis    5/16/19: BLE with no HD significant stenosis, SIDRA 0.8/1.46, left ankle pressures 60 and 171    7/29/19: BLE arterial US with approx 50% R TPT stenosis, SIDRA 0.8; LLE showing no HD significant stenosis, SIDRA 0.66    11/4/19: SIDRA 0.4/0.4, DP vessel NC bilaterally, R toe  waveform normal  Right lower extremity arterial ultrasound shows no hemodynamically significant stenosis.  Pressures indicate moderate arterial occlusive disease.  Left lower extremity arterial ultrasound shows no hemodynamically significant stenosis.  Pressures indicate moderate arterial occlusive disease.      IMP/PLAN:  59 y.o. male with   Patient Active Problem List   Diagnosis    Epiretinal membrane, both eyes    Nuclear sclerotic cataract of right eye    Pseudophakia, left eye    Ptosis, left eyelid    DM type 2 with diabetic peripheral neuropathy    HLD (hyperlipidemia)    Neovascular glaucoma of both eyes    Tophaceous gout    Essential hypertension    Coronary artery disease involving native coronary artery of native heart without angina pectoris    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 edema, with long-term current use of insulin    Neovascular glaucoma of left eye, severe stage    Statin myopathy    Neovascular glaucoma, right eye, mild stage    Left leg cellulitis    PVD (peripheral vascular disease)    Right wrist pain    Multiple open wounds of lower leg    Hepatosplenomegaly    Type 2 diabetes mellitus with left diabetic foot ulcer    Open wound of left foot    Cellulitis of left lower extremity    Diabetic foot infection    Other ascites    Severe malnutrition    Goals of care, counseling/discussion    Alteration in skin integrity    MDR Acinetobacter baumannii infection    Takes dietary supplements    Intertriginous dermatitis associated with moisture    Debility    Infection due to multidrug-resistant Pseudomonas aeruginosa    Subacute osteomyelitis of left foot    Ascites    Acute on chronic combined systolic and diastolic congestive heart failure    Stage 3 chronic kidney disease    Atherosclerosis of left lower extremity with ulceration of midfoot    CKD stage 3 due to type 2 diabetes mellitus     Azotemia    Hyponatremia    Anemia due to multiple mechanisms    Persistent proteinuria    Hypokalemia    Open wound of foot, left, subsequent encounter    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    being managed by PCP and specialists who is here today for evaluation of L foot wound.    -Improving L foot wound improved s/p debridement 2/13/19, multifactorial due to diabetes, PVD and venous insufficiency with improvement in granulation tissue on Abx due to multidrug resistent bacteria in the past s/p debridements and L tibial angioplasty x 2 with improvement in wound +granulation tissue s/p debridement 6/5/19 and WV placement; eval by Plastic Surgery re: grafting / skin grafting vs continuing wound care and allowing to heal by secondary intention - pt desires to cont wound care  -Cont ID rec Abx regimen  -Recommend continued wound care center care   -Rec BLE Xeroform and dry kerlix wraps twice daily to shin regions with elevation of LLE above level of the heart  -Low sodium diet  -Strict glycemic control  -RTC 3 mo for continued evaluation     I spent 20 minutes evaluating this patient and greater than 50% of the time was spent counseling, coordinator care and discussing the plan of care.  All questions were answered and patient stated understanding with agreement with the above treatment plan.    Mitch Chan MD VI  Vascular and Endovascular Surgery

## 2019-11-04 NOTE — PATIENT INSTRUCTIONS
Understanding Peripheral Arterial Disease    Peripheral arteries deliver oxygen-rich blood to the tissues outside the heart. As you age, your arteries become stiffer and thicker. In addition, risk factors, such as smoking and high cholesterol, can damage the artery lining. This allows a buildup of fat and other materials (plaque) to form within the artery walls. The buildup of plaque narrows the space inside the artery and sometimes blocks blood flow. Peripheral arterial disease (PAD) happens when blood flow through the arteries is reduced because of plaque buildup. It often happens in the legs and feet, but can also happen elsewhere in the body. If this buildup happens in the a large artery in the neck (carotid artery), it can be a major contributor to stroke.  A healthy artery  An artery is a muscular tube that carries oxgen rich blood and nutrients from the heart to the rest of the body. It has a smooth lining and flexible walls that allow blood to pass freely. When active, muscles need more oxygen. This increases blood flow. Healthy arteries can adapt to meet this need.  A damaged artery    PAD begins when the lining of an artery is damaged. This is often because of risk factors, such as smoking, older age, or diabetes. Plaque then starts to form within the artery wall. At this stage, blood flows normally, so youre not likely to have symptoms.  A narrowed artery    If plaque continues to build up, the space inside the artery narrows. The artery walls become less able to expand. The artery still provides enough blood and oxygen to your muscles during rest. But when youre active, the increased demand for blood cant be met. As a result, your leg may cramp or ache when you walk.  A blocked artery    An artery can become blocked by plaque or by a blood clot lodged in a narrowed section. When this happens, oxygen cant reach the muscle below the blockage. Then you may feel pain when lying down or when you are not  active (rest pain). This type of pain is especially common at night when youre lying flat. In time, the affected tissue can die. This can lead to the loss of a toe or foot.  Date Last Reviewed: 5/1/2016 © 2000-2017 [employer]. [address] [place] [address]. All rights reserved. This information is not intended as a substitute for professional medical care. Always follow your healthcare professional's instructions.        Peripheral Artery Disease (PAD)     Peripheral artery disease (PAD) occurs when the arteries that carry blood to the limbs are narrowed or blocked. This is usually due to a buildup of a fatty substance called plaque in the walls of the arteries.  PAD most often affects the arteries in the legs. When these arteries are narrowed or blocked, blood flow to the legs is reduced. This can cause leg and foot pain and other symptoms. If severe enough, reduced blood flow to the legs can lead to tissue death (gangrene) and the loss of a toe, foot, or leg. Having PAD also makes it more likely that arteries in other body areas are blocked. For instance, arteries that carry blood to the heart or brain may be affected. This raises the chances of heart attack and stroke.  Risk factors  Certain factors can make PAD more likely. They include:  · Smoking  · Diabetes  · High blood pressure  · Unhealthy cholesterol levels  · Obesity  · Inactive lifestyle  · Older age  · Family history of PAD  Symptoms  Many people with PAD have no symptoms. If symptoms do occur, they can include:  · Pain in the muscles of the calves, thighs or hips that gets worse with activity and better with rest (intermittent claudication)  · Achy, tired, or heavy feeling in the legs  · Weakness, numbness, tingling, or loss of feeling in the legs  · Changes in skin color of the legs  · Sores on the legs and feet  · Cold leg, feet, or toes  · Pain the feet or toes even when lying down (rest pain)  Home care  PAD is a  chronic (lifelong) condition. Treatment is focused on managing your condition and lowering your health risks. This may include doing the following:  · If you smoke, quit. This helps prevent further damage to your arteries and lowers your health risks. Ask your provider about medicines or products that can help you quit smoking. Also consider joining a stop-smoking program or support group.  · Be more active. This helps you lose weight and manage problems such as high blood pressure and unhealthy cholesterol levels. Start a walking program if advised to by your provider. Your provider may also help you form a safe exercise program that is right for your needs.  · Make healthy eating changes. This includes eating less fat, salt, and sugar.  · Take medicines for high blood pressure, unhealthy cholesterol levels, and diabetes as directed.  · Have your blood pressure and cholesterol levels checked as often as directed.  · If you have diabetes, try to keep your blood sugar well controlled. Test your blood sugar as directed.  · If you are overweight, talk to your provider about forming a weight-loss plan.  · Watch for cuts, scrapes, or open sores on your feet. Poor blood flow to the feet may slow healing and increase the risk of infection from these problems.   Follow-up care  Follow up with your healthcare provider, or as advised. If imaging tests such as ultrasound were done, they will be reviewed by a doctor. You will be told the results and any new findings that may affect your care.  When to seek medical advice   Call your healthcare provider right away if any of these occur:  · Sudden severe pain in the legs or feet  · Sudden cold, pale or blue color in the legs or feet  · Weakness or numbness in the legs or feet that worsens  · Any sore or wound in the legs or feet that wont heal  · Weak pulse in your legs or feet  Know the Signs of Heart Attack and Stroke  People with PAD are at high risk for heart attack and  stroke. Knowing the signs of these problems can help you protect your health and get help when you need it. Call 911 right away if you have any of the following:  Signs of a Heart Attack  · Chest discomfort, such as pain, aching, tightness, or pressure that lasts more than a few minutes, or that comes and goes  · Pain or discomfort in the arms, back, shoulders, neck, or jaw  · Shortness of breath  · Sweating (often a cold, clammy sweat)  · Nausea  · Lightheadedness  Signs of a Stroke  · Sudden numbness or weakness of the face, arms, or legs, especially on one side  · Sudden confusion or trouble speaking or understanding  · Sudden trouble seeing in one or both eyes  · Sudden trouble walking, dizziness, or loss of balance  · Sudden, severe headache with no known cause   Date Last Reviewed: 9/21/2015  © 6016-8902 [employer]. [address] [place] [address]. All rights reserved. This information is not intended as a substitute for professional medical care. Always follow your healthcare professional's instructions.        A Walking Program for Peripheral Arterial Disease (PAD)  Peripheral arterial disease (PAD) is a condition where the arteries in the legs are severely damaged. When you have PAD, walking can be painful. So you may start to walk less. Walking less makes your leg muscles weaker. It then becomes harder and more painful to walk. A walking program can break this cycle. This sheet gives you tips on how to get started.     Walking farther without pain  Exercise strengthens leg muscles that are out of shape. It also trains these muscles to work with less oxygen. This helps your leg muscles work better even with reduced blood flow to your legs. When you have PAD, a walking program can be helpful. Your program can:  · Let you walk longer and farther without claudication. This is an ache in your legs during exercise that goes away with rest.  · Let you do more and be more active  · Add to  your overall health and well-being  · Help you control your blood sugar and blood pressure  · Help you become healthier with no risk and at little or no cost  Getting started  Your local hospital, vascular center, or cardiac rehab center may have a special walking program for people with PAD. If so, this is your best option. But if you cant find a program, or its not covered by insurance, you can still walk on your own. Follow these steps at each session:  · Step 1. Start at a pace that lets you walk for 5 to 10 minutes before you start to feel claudication. This feeling is unpleasant, but it doesnt hurt you. Keep going until the pain makes you feel that you need to stop.  · Step 2. Stop and rest for 3 to 5 minutes, just long enough for the pain to go away. You can rest standing or sitting. (Some people like to bring along a cane or a lightweight folding chair.)  · Step 3. Again walk at a pace that lets you walk for 5 to 10 minutes more before you feel pain. This may be slower than your starting pace in step 1. Then rest again.  · Step 4. Repeat this process until youve walked for 45 minutes. This should be about 60 to 80 minutes total, including resting time. You may not be able to do a full 45 minutes at first. Do as much as you can, and increase your time as you improve.  Making the most of your program  · Walk at least 3 times a week. Take no more than 2 days off between sessions. If you stop walking, even for a week or two, you can lose the health benefits of your program.  · Find a good place to walk. A treadmill or a track may be better at first. That way, you wont run the risk of going too far and finding that you cant walk back. Be sure to have a place to walk indoors in bad weather, such as a gym or a mall.  · Wear shoes with sturdy, flexible soles. The heel should fit without slipping. You should have room to wiggle your toes.  · Keep track of how long you walk. A pedometer will show your daily  progress. It can also show how much farther you can walk as time goes by.  · Ask a friend to keep you company. You may enjoy walking with someone else. Or you may want to make your walking sessions a time to relax by yourself.  · Make it fun. Listen to music while you walk and rest. Walk in a favorite park. Get to know the people at the gym, or the folks that you pass on your route. While you rest, you can window-shop, read, or feed the birds. Do whatever will help you enjoy your exercise sessions.  Date Last Reviewed: 6/1/2016  © 1411-6758 [employer]. [address] [place] [address], [place] [address]. All rights reserved. This information is not intended as a substitute for professional medical care. Always follow your healthcare professional's instructions.

## 2019-11-05 ENCOUNTER — HOSPITAL ENCOUNTER (OUTPATIENT)
Dept: WOUND CARE | Facility: HOSPITAL | Age: 59
Discharge: HOME | End: 2019-11-05
Attending: FAMILY MEDICINE
Payer: COMMERCIAL

## 2019-11-05 DIAGNOSIS — E11.621 TYPE 2 DIABETES MELLITUS WITH LEFT DIABETIC FOOT ULCER: Primary | ICD-10-CM

## 2019-11-05 DIAGNOSIS — L97.529 TYPE 2 DIABETES MELLITUS WITH LEFT DIABETIC FOOT ULCER: Primary | ICD-10-CM

## 2019-11-05 LAB
LEFT ABI: 1.85
LEFT ANT TIBIAL SYS PSV: 110 CM/S
LEFT ARM BP: 138 MMHG
LEFT CFA PSV: 146 CM/S
LEFT DORSALIS PEDIS: 255 MMHG
LEFT EXTERNAL ILIAC PSV: 87 CM/S
LEFT PERONEAL SYS PSV: 225 CM/S
LEFT POPLITEAL PSV: 125 CM/S
LEFT POST TIBIAL SYS PSV: 51 CM/S
LEFT POSTERIOR TIBIAL: 51 MMHG
LEFT PROFUNDA SYS PSV: 126 CM/S
LEFT SUPER FEMORAL DIST SYS PSV: 100 CM/S
LEFT SUPER FEMORAL MID SYS PSV: 145 CM/S
LEFT SUPER FEMORAL OSTIAL SYS PSV: 108 CM/S
LEFT SUPER FEMORAL PROX SYS PSV: 100 CM/S
LEFT TIB/PER TRUNK SYS PSV: 80 CM/S
OHS CV LEFT LOWER EXTREMITY ABI (NO CALC): 0.37
OHS CV RIGHT ABI LOWER EXTREMITY (NO CALC): 0.43
RIGHT ABI: 1.85
RIGHT ANT TIBIAL SYS PSV: 41 CM/S
RIGHT ARM BP: 133 MMHG
RIGHT CFA PSV: 73 CM/S
RIGHT DORSALIS PEDIS: 255 MMHG
RIGHT EXTERNAL ILLIAC PSV: 93 CM/S
RIGHT PERONEAL SYS PSV: 75 CM/S
RIGHT POPLITEAL PSV: 82 CM/S
RIGHT POST TIBIAL SYS PSV: 34 CM/S
RIGHT POSTERIOR TIBIAL: 59 MMHG
RIGHT PROFUNDA SYS PSV: 103 CM/S
RIGHT SUPER FEMORAL DIST SYS PSV: 133 CM/S
RIGHT SUPER FEMORAL MID SYS PSV: 142 CM/S
RIGHT SUPER FEMORAL OSTIAL SYS PSV: 102 CM/S
RIGHT SUPER FEMORAL PROX SYS PSV: 98 CM/S
RIGHT TIB/PER TRUNK SYS PSV: 50 CM/S

## 2019-11-05 PROCEDURE — 99214 PR OFFICE/OUTPT VISIT, EST, LEVL IV, 30-39 MIN: ICD-10-PCS | Mod: 25,,, | Performed by: FAMILY MEDICINE

## 2019-11-05 PROCEDURE — 11045 PR DEB SUBQ TISSUE ADD-ON: ICD-10-PCS | Mod: ,,, | Performed by: FAMILY MEDICINE

## 2019-11-05 PROCEDURE — 11042 DBRDMT SUBQ TIS 1ST 20SQCM/<: CPT | Performed by: FAMILY MEDICINE

## 2019-11-05 PROCEDURE — 99214 OFFICE O/P EST MOD 30 MIN: CPT | Mod: 25,,, | Performed by: FAMILY MEDICINE

## 2019-11-05 PROCEDURE — 11042 DBRDMT SUBQ TIS 1ST 20SQCM/<: CPT | Mod: ,,, | Performed by: FAMILY MEDICINE

## 2019-11-05 PROCEDURE — 11045 DBRDMT SUBQ TISS EACH ADDL: CPT | Performed by: FAMILY MEDICINE

## 2019-11-05 PROCEDURE — 11045 DBRDMT SUBQ TISS EACH ADDL: CPT | Mod: ,,, | Performed by: FAMILY MEDICINE

## 2019-11-05 PROCEDURE — 11042 PR DEBRIDEMENT, SKIN, SUB-Q TISSUE,=<20 SQ CM: ICD-10-PCS | Mod: ,,, | Performed by: FAMILY MEDICINE

## 2019-11-05 PROCEDURE — 27201912 HC WOUND CARE DEBRIDEMENT SUPPLIES: Performed by: FAMILY MEDICINE

## 2019-11-05 NOTE — PROGRESS NOTES
HPI     Post-op Evaluation      Additional comments: Pt c/o blurry vision OS              Comments     S/p Revision of Extruding Baerveldt OS 10/2/19 - tube cut - no longer   functions   (with pericardial patch graft)    MEDS:  PA QDAY  Atropine QDAY OS  Cosopt TID OU  Brimonidine TID OU          Last edited by Perla Cortés MD on 11/7/2019 11:23 AM. (History)              Assessment /Plan     For exam results, see Encounter Report.    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 POAG od   S/P ahmed os 2011  S/P phaco/IOL / baerveldt os 2012       1. NVG OS 2/2 PDR // ?? POAG od - on gtts    First HVF   ? 10/2014    First photos   6/2016   Treatment / Drops started   Years ago           Family history    ?        Glaucoma meds    cosopt ou / alphagan ou         H/O adverse rxn to glaucoma drops    ? Try and avoid PG's - PDR with ME and NVG os         LASERS    SLT - ? Os or OU // G6 laser os // PRP ou         GLA[facility]COMA SURGERIES    [facility]  // ba[place]veldt os - [place] - ? [place]  - both at [facility] /   / G6 laser os 2/15/2017        OTHER EYE SURGERIES    Phaco/IOL od 9/26/2018         CDR    0.4/0.9        Tbase    ??  (on gtts at [facility] from 2014 to 2016 ran - 12-19 od // 16-25 os )         Tmax    ?             Ttarget    ?              HVF    4 test 2014 to  2015 - [facility] 2 test 2016 to 2019  -24-2 ss  SAD/IAD od // 24-2 stim V gen dep - SAD / IAD os         Gonio    +3 od // +3 sup os with PAS T/N/I         CCT    449/540        OCT    2 test 2016 to 2018 - RNFL - dec S/I od  od // dec S/I, bord N  os        HRT    2 test 2017 to 2018 - MR - nl  od // dec TI/ bord NI  os        Disc photos    2016     - Ttoday   11/17  - Test done today    F/U revision of GDD tube errosion OS of the infero-nasal tube  - 10/3/2019      - OFF -  Xalatan OD due to macular edema  - pt reports good adherence  Cont  cosopt bid ou, brimonidine bid ou        2.  PDR s/p PRP OU (DM2), with CSME OU (and HTN)  - Encouraged good BS/BP/Cholesterol control    3. DME OU  OD>OS  S/p Avastin OD x 9, OS x 7  Will monitor OS for now given NVG and ERM      4. PSK OS  - Stable, CTM, RD precautions       5. ERM OS  Given NVG hx, monitor  - CTM    6. PC IOL OU    OD 9/26/2018 - pcb00 17.0   Os       Cont cosopt ou tid   Cont alphagan tid ou   Stay off  latanoprost - - 2/2 DME od     H/O  RUBEOSIS AT THE PUPIL OD AND 1 SMALL AREA OF ANGLE VIRI ING OD (post CE)    S/P more avastin od 10/9/2018 - [facility] - rubeosis regressed    S/p fill in PRP od - 11/26/2018 - [facility] - rubeosis regressed    S/p MORE AVASTIN OD 1/25/2019     VA good - BCVA - 20/40 - 11/30/2018    If IOP goes up OS  can re-start PG's os and / or repeat cyclo G6    5/24/2019 - pt has been very ill   + CHF   + PVD - had toes amputated left foot   -S/P angioplasty to open leg vessels   Out of hospital and at home - now - wound care continues     GDD - tube errosion of the IN tube OS - // revision // tube no longer functions - 10/2/2019     POM  # 1   anterior chamber depth  Deep   Bleb appearance  None  Patch graft in place IN quadrant   sidell neg  IOP 11/17    Meds: - operated eye  Pred acetate  1 X DAY - until bottle runs out   Atropine- stop     Loose vicryl sutures removed 11/7/2019     cosopt tid ou    Brimonidine tid ou       F/U  4-6 weeks    // if IOP is too high  can try PG or rhopressa / or more G6 laser      I have seen and personally examined the patient.  I agree with the findings, assessment and plan of the resident and/or fellow.     Perla Cortés MD

## 2019-11-07 ENCOUNTER — OFFICE VISIT (OUTPATIENT)
Dept: OPHTHALMOLOGY | Facility: CLINIC | Age: 59
End: 2019-11-07
Payer: COMMERCIAL

## 2019-11-07 DIAGNOSIS — Z96.1 PSEUDOPHAKIA: ICD-10-CM

## 2019-11-07 DIAGNOSIS — Z48.89 ENCOUNTER FOR POSTOPERATIVE CARE: Primary | ICD-10-CM

## 2019-11-07 DIAGNOSIS — H21.1X1 RUBEOSIS IRIDIS, RIGHT: ICD-10-CM

## 2019-11-07 DIAGNOSIS — H40.51X1 NEOVASCULAR GLAUCOMA, RIGHT EYE, MILD STAGE: ICD-10-CM

## 2019-11-07 DIAGNOSIS — H40.52X3 NEOVASCULAR GLAUCOMA, LEFT EYE, SEVERE STAGE: ICD-10-CM

## 2019-11-07 DIAGNOSIS — T85.9XXA DISORDER DUE TO INSERTION OF GLAUCOMA DRAINAGE DEVICE: ICD-10-CM

## 2019-11-07 DIAGNOSIS — H35.373 EPIRETINAL MEMBRANE, BOTH EYES: ICD-10-CM

## 2019-11-07 DIAGNOSIS — Z96.89 HISTORY OF GLAUCOMA TUBE SHUNT PROCEDURE: ICD-10-CM

## 2019-11-07 PROCEDURE — 99024 POSTOP FOLLOW-UP VISIT: CPT | Mod: S$GLB,,, | Performed by: OPHTHALMOLOGY

## 2019-11-07 PROCEDURE — 99999 PR PBB SHADOW E&M-EST. PATIENT-LVL II: CPT | Mod: PBBFAC,,, | Performed by: OPHTHALMOLOGY

## 2019-11-07 PROCEDURE — 99999 PR PBB SHADOW E&M-EST. PATIENT-LVL II: ICD-10-PCS | Mod: PBBFAC,,, | Performed by: OPHTHALMOLOGY

## 2019-11-07 PROCEDURE — 99024 PR POST-OP FOLLOW-UP VISIT: ICD-10-PCS | Mod: S$GLB,,, | Performed by: OPHTHALMOLOGY

## 2019-11-07 RX ORDER — ATROPINE SULFATE 10 MG/ML
1 SOLUTION/ DROPS OPHTHALMIC DAILY
Status: ON HOLD | COMMUNITY
End: 2020-03-16

## 2019-11-08 ENCOUNTER — TELEPHONE (OUTPATIENT)
Dept: HOME HEALTH SERVICES | Facility: HOSPITAL | Age: 59
End: 2019-11-08

## 2019-11-12 ENCOUNTER — HOSPITAL ENCOUNTER (OUTPATIENT)
Dept: WOUND CARE | Facility: HOSPITAL | Age: 59
Discharge: HOME | End: 2019-11-12
Attending: FAMILY MEDICINE
Payer: COMMERCIAL

## 2019-11-12 DIAGNOSIS — L97.529 TYPE 2 DIABETES MELLITUS WITH LEFT DIABETIC FOOT ULCER: Primary | ICD-10-CM

## 2019-11-12 DIAGNOSIS — E11.621 TYPE 2 DIABETES MELLITUS WITH LEFT DIABETIC FOOT ULCER: Primary | ICD-10-CM

## 2019-11-12 PROCEDURE — 99215 OFFICE O/P EST HI 40 MIN: CPT | Performed by: FAMILY MEDICINE

## 2019-11-12 PROCEDURE — 99214 PR OFFICE/OUTPT VISIT, EST, LEVL IV, 30-39 MIN: ICD-10-PCS | Mod: ,,, | Performed by: FAMILY MEDICINE

## 2019-11-12 PROCEDURE — 99214 OFFICE O/P EST MOD 30 MIN: CPT | Mod: ,,, | Performed by: FAMILY MEDICINE

## 2019-11-16 RX ORDER — TORSEMIDE 20 MG/1
TABLET ORAL
Qty: 120 TABLET | Refills: 0 | Status: SHIPPED | OUTPATIENT
Start: 2019-11-16 | End: 2019-12-01 | Stop reason: SDUPTHER

## 2019-11-18 ENCOUNTER — HOSPITAL ENCOUNTER (OUTPATIENT)
Facility: HOSPITAL | Age: 59
Discharge: HOME | End: 2019-11-18
Attending: RADIOLOGY | Admitting: RADIOLOGY
Payer: COMMERCIAL

## 2019-11-18 VITALS — SYSTOLIC BLOOD PRESSURE: 108 MMHG | DIASTOLIC BLOOD PRESSURE: 62 MMHG

## 2019-11-18 DIAGNOSIS — R18.8 OTHER ASCITES: ICD-10-CM

## 2019-11-18 NOTE — NURSING
6.5 liters of Peritoneal fluid drained. Per , pt does not have to go to [facility]. Spoke with Nahomy and notified her of pt not coming up.

## 2019-11-18 NOTE — H&P
Radiology History & Physical      SUBJECTIVE:     Chief Complaint: recurrent ascites    History of Present Illness:  Marvin Ray is a 59 y.o. male who presents for paracentesis.  Clotting factors OK.  Past Medical History:   Diagnosis Date    Arthritis     Cellulitis     CKD (chronic kidney disease), stage III     Coronary artery disease     Diabetes mellitus     Diabetic retinopathy     Diabetic ulcer of left foot     Glaucoma     Gout     Hyperlipemia     Hypertension     ICD (implantable cardioverter-defibrillator) in place 11/02/2018    Left chest    Non-pressure chronic ulcer of other part of left foot with fat layer exposed 10/23/2018    PVD (peripheral vascular disease)     Type 2 diabetes mellitus with left diabetic foot ulcer 10/29/2018    Unsteady gait     uses a wheelchair     Past Surgical History:   Procedure Laterality Date    AHMED GLAUCOMA IMPLANT Left 2011    DONE AT [facility]    AORTOGRAPHY WITH SERIALOGRAPHY Left 4/30/2019    Procedure: AORTOGRAM, WITH SERIALOGRAPHY, LEFT LOWER EXTREMITY, POSSIBLE INTERVENTION;  Surgeon: Mitch Chan MD;  Location: [facility] OR;  Service: Vascular;  Laterality: Left;  RN PRE OP 4-23-19.  NO H & P, No Cosent  [place]    BAERVELDT GLAUCOMA IMPLANT Left 2012    WITH CATARACT EXTRACTION//DONE AT [facility]    CARDIAC CATHETERIZATION Left 05/2016    CARDIAC DEFIBRILLATOR PLACEMENT Left 11/02/2018    CATARACT EXTRACTION W/  INTRAOCULAR LENS IMPLANT Left 2012    WITH BAERVEDT//DONE AT [facility]    CATARACT EXTRACTION W/  INTRAOCULAR LENS IMPLANT Right 09/26/2018    COMPLEX ()    CB DESTRUCTION WITH CYCLO G6 Left 02/15/2017        CYST REMOVAL      DEBRIDEMENT OF FOOT  12/28/2018    Procedure: DEBRIDEMENT, FOOT;  Surgeon: Mitch Chan MD;  Location: [facility] OR;  Service: Vascular;;    DEBRIDEMENT OF FOOT  6/5/2019    Procedure: DEBRIDEMENT, FOOT;  Surgeon: Mitch Chan MD;  Location: [facility] OR;  Service:  Vascular;;    EXAMINATION UNDER ANESTHESIA Left 12/5/2018    Procedure: Exam under anesthesia, left foot debridement, washout and all other indicated procedures;  Surgeon: Mitch Wall MD;  Location: [facility] OR;  Service: Vascular;  Laterality: Left;  1030AM START  RN PREOP 12/3/2018-----AICD  SEEN BY DR JAMES 12/4    EXAMINATION UNDER ANESTHESIA Left 2/13/2019    Procedure: LEFT FOOT WOUND DEBRIDEMENT, WASHOUT AND ALL OTHER INDICATED PROCEDURES;  Surgeon: Mitch Wall MD;  Location: [facility] OR;  Service: Vascular;  Laterality: Left;  requesting 1st case start  THIS CASE 1ST PER DR WALL ON 2/1/19 @ 844AM -[place]  RN PREOP 2/6/2019  HAS CARDIAC CLEARANCE    EXAMINATION UNDER ANESTHESIA Left 12/28/2018    Procedure: Exam under anesthesia, left foot debridement, left foot washout, possible left second through fifth metatarsal resection;  Surgeon: Mitch Wall MD;  Location: [facility] OR;  Service: Vascular;  Laterality: Left;  1:00pm start per julissa @ 8:58am  RN  PHONE PRE OP 12-27-18--=Pt coming from [facility]  NEED CONSENT    EXAMINATION UNDER ANESTHESIA Left 6/5/2019    Procedure: LEFT FOOT DEBRIDEMENT, WASHOUT AND ALL OTHER INDICATED PROCEDURES;  Surgeon: Mitch Wall MD;  Location: [facility] OR;  Service: Vascular;  Laterality: Left;  RN PRE OP 5-31-19------ICD------NEED CONSENT    INCISION AND DRAINAGE Left 11/14/2018    Procedure: Incision and Drainage, left lower extremity debridement, washout;  Surgeon: Mitch Wall MD;  Location: [facility] OR;  Service: Vascular;  Laterality: Left;    INCISION AND DRAINAGE Left 11/16/2018    Procedure: INCISION AND DRAINAGE;  Surgeon: Mitch Wall MD;  Location: [facility] OR;  Service: Vascular;  Laterality: Left;    INTRAOCULAR PROSTHESES INSERTION Right 9/26/2018    Procedure: INSERTION, IOL PROSTHESIS;  Surgeon: Perla Cortés MD;  Location: [facility] OR [address] [place] [address];  Service: Ophthalmology;  Laterality: Right;    PERITONEOCENTESIS N/A  3/1/2019    Procedure: PARACENTESIS, ABDOMINAL, INITIAL;  Surgeon: Dosc Diagnostic Provider;  Location: [facility] OR;  Service: Radiology;  Laterality: N/A;    PERITONEOCENTESIS N/A 3/25/2019    Procedure: PARACENTESIS, ABDOMINAL, INITIAL;  Surgeon: Dosc Diagnostic Provider;  Location: [facility] OR;  Service: Radiology;  Laterality: N/A;    PERITONEOCENTESIS N/A 7/22/2019    Procedure: PARACENTESIS, ABDOMINAL, INITIAL;  Surgeon: Dosc Diagnostic Provider;  Location: [facility] OR;  Service: Radiology;  Laterality: N/A;    PERITONEOCENTESIS N/A 8/16/2019    Procedure: PARACENTESIS, ABDOMINAL, INITIAL;  Surgeon: Dosc Diagnostic Provider;  Location: [facility] OR;  Service: Radiology;  Laterality: N/A;    PERITONEOCENTESIS N/A 9/26/2019    Procedure: PARACENTESIS, ABDOMINAL;  Surgeon: Dosc Diagnostic Provider;  Location: [facility] OR;  Service: Radiology;  Laterality: N/A;    PERITONEOCENTESIS N/A 10/11/2019    Procedure: PARACENTESIS, ABDOMINAL;  Surgeon: Dosc Diagnostic Provider;  Location: [facility] OR;  Service: Radiology;  Laterality: N/A;    PERITONEOCENTESIS N/A 10/31/2019    Procedure: PARACENTESIS, ABDOMINAL;  Surgeon: Dosc Diagnostic Provider;  Location: [facility] OR;  Service: Radiology;  Laterality: N/A;    PHACOEMULSIFICATION OF CATARACT Right 9/26/2018    Procedure: PHACOEMULSIFICATION, CATARACT;  Surgeon: Perla Cortés MD;  Location: [address];  Service: Ophthalmology;  Laterality: Right;    REPEAT ABDOMINAL PARACENTESIS N/A 2/11/2019    Procedure: PARACENTESIS, ABDOMINAL, REPEAT;  Surgeon: Dosc Diagnostic Provider;  Location: [facility] OR;  Service: Radiology;  Laterality: N/A;  1PM START    REPEAT ABDOMINAL PARACENTESIS N/A 9/12/2019    Procedure: PARACENTESIS, ABDOMINAL, REPEAT;  Surgeon: Dosc Diagnostic Provider;  Location: [facility] OR;  Service: Radiology;  Laterality: N/A;  9AM START    REVISION OF PROCEDURE INVOLVING GLAUCOMA DRAINAGE DEVICE Left 10/2/2019    EXTRUDING BAERVELDT /WITH PERICARDIAL PATCH GRAFT  ()    Right foot surgery  10/2014    TOE AMPUTATION Right     first and second    TOE AMPUTATION Left 11/16/2018    Procedure: AMPUTATION, TOES  2-5;  Surgeon: Mitch Chan MD;  Location: [facility] OR;  Service: Vascular;  Laterality: Left;    TOE AMPUTATION Left 12/5/2018    Procedure: AMPUTATION, TOE;  Surgeon: Mitch Chan MD;  Location: [facility] OR;  Service: Vascular;  Laterality: Left;  left great toe    TONSILLECTOMY         Home Meds:   Prior to Admission medications    Medication Sig Start Date End Date Taking? Authorizing Provider   allopurinol (ZYLOPRIM) 300 MG tablet Take 1 tablet (300 mg total) by mouth once daily. 9/27/19   Rubén Davis MD   aspirin 325 MG tablet Take 325 mg by mouth once daily.     Historical Provider, MD   atropine 1% (ISOPTO ATROPINE) 1 % Drop Place 1 drop into the left eye once daily.    Perla Cortés MD   bisacodyl (DULCOLAX) 5 mg EC tablet Take 5 mg by mouth daily as needed for Constipation.    Historical Provider, MD   brimonidine 0.2% (ALPHAGAN) 0.2 % Drop Place 1 drop into both eyes 2 (two) times daily.  Patient taking differently: Place 1 drop into both eyes 3 (three) times daily.  2/8/19   Perla Cortés MD   carvedilol (COREG) 3.125 MG tablet Take 1 tablet (3.125 mg total) by mouth 2 (two) times daily. 1/23/19 1/23/20  Sara Ching MD   coenzyme Q10 100 mg capsule Take 100 mg by mouth every morning.    Historical Provider, MD   dorzolamide-timolol 2-0.5% (COSOPT) 22.3-6.8 mg/mL ophthalmic solution Place 1 drop into both eyes 2 (two) times daily.  Patient taking differently: Place 1 drop into both eyes 3 (three) times daily.  2/8/19 2/8/20  Perla Cortés MD   doxycycline (VIBRAMYCIN) 100 MG Cap Take 1 capsule (100 mg total) by mouth every 12 (twelve) hours. 8/6/19   Leonidas Diaz MD   ezetimibe (ZETIA) 10 mg tablet TAKE 1 TABLET(10 MG) BY MOUTH EVERY DAY 10/24/19   Rubén Davis MD   fish oil-omega-3 fatty acids  "300-1,000 mg capsule Take 1 capsule by mouth 2 (two) times daily. 1/23/19   Sara Ching MD   insulin aspart U-100 (NOVOLOG) 100 unit/mL InPn pen Use on premeal readings: 150-200=+2, 201-250=+4; 251-300=+6; 301-350=+8, over 350=+10 units 1/31/19   Sheryl Madison NP   insulin degludec-liraglutide (XULTOPHY 100/3.6) 100 unit-3.6 mg /mL (3 mL) InPn Inject 42 Units into the skin once daily. 2/6/19   Sheryl Madison NP   MULTIVIT,THER IRON,CA,FA & MIN (MULTIVITAMIN) Tab Take 1 tablet by mouth every morning.     Historical Provider, MD   pen needle, diabetic 31 gauge x 1/4" Ndle Use as directed 8/11/16   Mike Bass MD   prednisoLONE acetate (PRED FORTE) 1 % DrpS Place 1 drop into the left eye every morning. 10/10/19   Perla Cortés MD   torsemide (DEMADEX) 20 MG Tab TAKE 4 TABLETS BY MOUTH TWICE DAILY 11/16/19   Rubén Davis MD     Anticoagulants/Antiplatelets: ASA    Allergies:   Review of patient's allergies indicates:   Allergen Reactions    Statins-hmg-coa reductase inhibitors      Generalized Pain    Onglyza [saxagliptin]     Penicillins Rash     Sedation History:  no adverse reactions    Review of Systems:   Hematological: no known coagulopathies  Respiratory: some SOB  no shortness of breath  Cardiovascular: no chest pain  Gastrointestinal: positive for - gas/bloating  no abdominal pain  Genito-Urinary: no dysuria  Musculoskeletal: negative  Neurological: no TIA or stroke symptoms         OBJECTIVE:     Vital Signs (Most Recent)  BP: 108/65 (11/18/19 1330)    Physical Exam:  ASA: 2  Mallampati: 3    General: no acute distress  Mental Status: alert and oriented to person, place and time  HEENT: normocephalic, atraumatic  Chest: unlabored breathing  Heart: regular heart rate  Abdomen: nondistended  Extremity: moves all extremities    Laboratory  Lab Results   Component Value Date    INR 1.1 05/31/2019       Lab Results   Component Value Date    WBC 5.13 08/22/2019    HGB 9.9 (L) 08/22/2019    HCT " 32.0 (L) 08/22/2019    MCV 86 08/22/2019     08/22/2019      Lab Results   Component Value Date    GLU 83 08/22/2019     08/22/2019    K 3.6 08/22/2019     08/22/2019    CO2 28 08/22/2019    BUN 39 (H) 08/22/2019    CREATININE 1.1 08/22/2019    CALCIUM 8.8 08/22/2019    MG 2.1 05/31/2019    ALT 12 08/22/2019    AST 14 08/22/2019    ALBUMIN 2.8 (L) 08/22/2019    BILITOT 0.5 08/22/2019       ASSESSMENT/PLAN:     Sedation Plan: local  Patient will undergo paracentesis.    Jenna Mackenzie MD  Staff Radiologist  Department of Radiology  Pager: [phone]

## 2019-11-18 NOTE — DISCHARGE SUMMARY
Radiology Post-Procedure Note    Pre Op Diagnosis: Ascites  Post Op Diagnosis: Same    Procedure: Paracentesis    Procedure performed by: Jenna Mackenzie MD    Written Informed Consent Obtained: Yes  Specimen Removed: NO  Estimated Blood Loss: less than 50     Findings:   Successful paracentesis.  Albumin administered PRN per protocol.    Patient tolerated procedure well.    Jenna Mackenzie MD  Staff Radiologist  Department of Radiology  Pager: [phone]

## 2019-11-19 ENCOUNTER — HOSPITAL ENCOUNTER (OUTPATIENT)
Dept: WOUND CARE | Facility: HOSPITAL | Age: 59
Discharge: HOME | End: 2019-11-19
Attending: FAMILY MEDICINE
Payer: COMMERCIAL

## 2019-11-19 DIAGNOSIS — E11.621 TYPE 2 DIABETES MELLITUS WITH LEFT DIABETIC FOOT ULCER: Primary | ICD-10-CM

## 2019-11-19 DIAGNOSIS — L97.529 TYPE 2 DIABETES MELLITUS WITH LEFT DIABETIC FOOT ULCER: Primary | ICD-10-CM

## 2019-11-19 PROCEDURE — 11045 PR DEB SUBQ TISSUE ADD-ON: ICD-10-PCS | Mod: ,,, | Performed by: FAMILY MEDICINE

## 2019-11-19 PROCEDURE — 11042 PR DEBRIDEMENT, SKIN, SUB-Q TISSUE,=<20 SQ CM: ICD-10-PCS | Mod: ,,, | Performed by: FAMILY MEDICINE

## 2019-11-19 PROCEDURE — 11045 DBRDMT SUBQ TISS EACH ADDL: CPT | Mod: ,,, | Performed by: FAMILY MEDICINE

## 2019-11-19 PROCEDURE — 99214 PR OFFICE/OUTPT VISIT, EST, LEVL IV, 30-39 MIN: ICD-10-PCS | Mod: 25,,, | Performed by: FAMILY MEDICINE

## 2019-11-19 PROCEDURE — 27201912 HC WOUND CARE DEBRIDEMENT SUPPLIES: Performed by: FAMILY MEDICINE

## 2019-11-19 PROCEDURE — 99214 OFFICE O/P EST MOD 30 MIN: CPT | Mod: 25,,, | Performed by: FAMILY MEDICINE

## 2019-11-19 PROCEDURE — 97598 DBRDMT OPN WND ADDL 20CM/<: CPT | Performed by: FAMILY MEDICINE

## 2019-11-19 PROCEDURE — 97597 DBRDMT OPN WND 1ST 20 CM/<: CPT | Performed by: FAMILY MEDICINE

## 2019-11-19 PROCEDURE — 11042 DBRDMT SUBQ TIS 1ST 20SQCM/<: CPT | Mod: ,,, | Performed by: FAMILY MEDICINE

## 2019-11-21 ENCOUNTER — TELEPHONE (OUTPATIENT)
Dept: FAMILY MEDICINE | Facility: CLINIC | Age: 59
End: 2019-11-21

## 2019-11-21 NOTE — TELEPHONE ENCOUNTER
----- Message from Elis Santacruz sent at 11/21/2019 12:13 PM CST -----  Contact: Joel/ [facility] Nurse/ [phone]  Type: Patient Call Back    Who called:  Patient    What is the request in detail:  Patient is going to miss his appt with wound care due to him taking a ride with his sister.  Nurse will be there tomorrow and Saturday.  Thank you    Would the patient rather a call back or a response via [facility]?   Call back    Best call back number: [phone]

## 2019-11-21 NOTE — TELEPHONE ENCOUNTER
Tried reaching [facility] nurse , called two times NA. Called pt and sister answered. She's upset about wound care / clinic schedule . Doesn't understand [facility] and wound clinic methods , was upset . Explain to pt sister [facility] and wound care will further handle the matter .    [facility] Joel call regarding pt canceling wound clinic apt . Informed her that wound clinic department needs to be contacted .  Please read previous note

## 2019-11-25 ENCOUNTER — PROCEDURE VISIT (OUTPATIENT)
Dept: OPHTHALMOLOGY | Facility: CLINIC | Age: 59
End: 2019-11-25
Attending: OPHTHALMOLOGY
Payer: COMMERCIAL

## 2019-11-25 VITALS — HEART RATE: 82 BPM | SYSTOLIC BLOOD PRESSURE: 107 MMHG | DIASTOLIC BLOOD PRESSURE: 69 MMHG

## 2019-11-25 DIAGNOSIS — H40.53X3 NEOVASCULAR GLAUCOMA OF BOTH EYES, SEVERE STAGE: Chronic | ICD-10-CM

## 2019-11-25 DIAGNOSIS — H40.52X3 NEOVASCULAR GLAUCOMA OF LEFT EYE, SEVERE STAGE: ICD-10-CM

## 2019-11-25 PROCEDURE — 92014 COMPRE OPH EXAM EST PT 1/>: CPT | Mod: 24,25,S$GLB, | Performed by: OPHTHALMOLOGY

## 2019-11-25 PROCEDURE — 92134 CPTRZ OPH DX IMG PST SGM RTA: CPT | Mod: S$GLB,,, | Performed by: OPHTHALMOLOGY

## 2019-11-25 PROCEDURE — 67028 INJECTION EYE DRUG: CPT | Mod: 79,RT,S$GLB, | Performed by: OPHTHALMOLOGY

## 2019-11-25 PROCEDURE — 67028 PR INJECT INTRAVITREAL PHARMCOLOGIC: ICD-10-PCS | Mod: 79,RT,S$GLB, | Performed by: OPHTHALMOLOGY

## 2019-11-25 PROCEDURE — 92014 PR EYE EXAM, EST PATIENT,COMPREHESV: ICD-10-PCS | Mod: 24,25,S$GLB, | Performed by: OPHTHALMOLOGY

## 2019-11-25 PROCEDURE — 92134 POSTERIOR SEGMENT OCT RETINA (OCULAR COHERENCE TOMOGRAPHY)-BOTH EYES: ICD-10-PCS | Mod: S$GLB,,, | Performed by: OPHTHALMOLOGY

## 2019-11-25 RX ADMIN — Medication 1.25 MG: at 12:11

## 2019-11-25 NOTE — PATIENT INSTRUCTIONS

## 2019-11-25 NOTE — PROGRESS NOTES
HPI     DLS: 7/26/19 with Dr Jackman     3 mons ck       S/p Revision of Extruding Baerveldt OS 10/2/19   (with pericardial patch graft     HPI     DLS: 5/24/19---Dr Cortés  DLS 4/26/19     3 mons ck     Pt states that VA in OD is stable, OS has off and on blur vision   -eye pain   -f/f     Meds: Cosopt BID OU   Alphagan BID OU         OCT : OD shows central DME, stable   OS ERM with DME central cyst, stable    Prior FA: shows macular leakage OU, significant NP OU, no NV OU, enlarged SURINDER OU    A/P    1. PDR s/p PRP OU (DM2), with CSME OU (and HTN)  - Encouraged good BS/BP/Cholesterol control  T2 uncontrolled on insulin  S/p PRP fill in 11/18    2. DME OU, OD>OS  - S/p Avastin OD x 18, OS x 7  - Will monitor OS for now given NVG and ERM  - Could consider Ozurdex but pt is POAG and phakic, IOP good right now)  With some NVI/NVA post CE OD  Will inject avastin q 4 month for now    - Avastin OD today      2. NVG OS   FH:   CCT:449 // 540    Gonio: OD CBB; OS sup CBB/nasal small NV at 9:00/inf PAS/temp CBB with PAS   Surgeries:;  phaco/BV 2012, s/p revision of BV for extrusion 10/19   Lasers: SLT   Tmax:    Tbase (before treatment):   Ttarget:     Drop intolerance:    APD:     - Stopped Xalatan OD due to macular edema  - pt reports good adherence  - No NVA OD. 1 NVA vessel at 9 oclock OS  - Cont drops as above    Sees KL    3. PCIOL OD  Some NVA noted after CE      4. PSK OS  - Stable, CTM, RD precautions       5. ERM OS>OD  - Given NVG hx, monitor  - CTM      4 months OCT    Risks, benefits, and alternatives to treatment discussed in detail with the patient.  The patient voiced understanding and wished to proceed with the procedure    Injection Procedure Note:  Diagnosis: PDR/NVG and DME OD    Patient Identified and Time Out complete  Topical Proparacaine and Betadine.  Inject Avastin OD at 6:00 @ 3.5-4mm posterior to limbus  Post Operative Dx: Same  Complications: None  Follow up as above.

## 2019-12-01 RX ORDER — TORSEMIDE 20 MG/1
TABLET ORAL
Qty: 120 TABLET | Refills: 0 | Status: SHIPPED | OUTPATIENT
Start: 2019-12-01 | End: 2019-12-23

## 2019-12-02 ENCOUNTER — HOSPITAL ENCOUNTER (OUTPATIENT)
Facility: HOSPITAL | Age: 59
Discharge: HOME | End: 2019-12-02
Attending: RADIOLOGY | Admitting: RADIOLOGY
Payer: MEDICAID

## 2019-12-02 VITALS
RESPIRATION RATE: 18 BRPM | OXYGEN SATURATION: 99 % | DIASTOLIC BLOOD PRESSURE: 70 MMHG | TEMPERATURE: 98 F | SYSTOLIC BLOOD PRESSURE: 132 MMHG | HEART RATE: 90 BPM

## 2019-12-02 DIAGNOSIS — R18.8 OTHER ASCITES: ICD-10-CM

## 2019-12-02 PROCEDURE — P9047 ALBUMIN (HUMAN), 25%, 50ML: HCPCS | Mod: JG | Performed by: RADIOLOGY

## 2019-12-02 PROCEDURE — 63600175 PHARM REV CODE 636 W HCPCS: Mod: JG | Performed by: RADIOLOGY

## 2019-12-02 RX ORDER — ALBUMIN HUMAN 250 G/1000ML
50 SOLUTION INTRAVENOUS ONCE AS NEEDED
Status: COMPLETED | OUTPATIENT
Start: 2019-12-02 | End: 2019-12-02

## 2019-12-02 RX ADMIN — ALBUMIN (HUMAN) 50 G: 25 SOLUTION INTRAVENOUS at 03:12

## 2019-12-02 NOTE — PROCEDURES
Radiology Post-Procedure Note     Pre Op Diagnosis: Recurrent painful, tense ascites  Post Op Diagnosis: Same     Procedure: U/S-guided therapeutic LVP     Procedure performed by: Zach Cha MD     Written Informed Consent Obtained: Yes  Specimen Removed: YES, 7.1-L of serous ascitic fluid  Estimated Blood Loss: none     Findings:   1. Successful therapeutic large-volume paracentesis with local anesthetic only and albumin infusion post per protocol. Patient tolerated the procedure well. No immediate post-procedural complications noted.       Thank you for considering IR for the care of your patient.      Zach Cha MD  Interventional Radiology

## 2019-12-02 NOTE — DISCHARGE SUMMARY
Radiology Discharge Summary      Hospital Course: No complications    Admit Date: 12/2/2019  Discharge Date: 12/02/2019     Instructions Given to Patient: Yes  Diet: Resume prior diet  Activity: activity as tolerated    Description of Condition on Discharge: Stable  Vital Signs (Most Recent): BP: 125/70 (12/02/19 1500)    Discharge Disposition: Home    Discharge Diagnosis:   CKD III and combined systolic/diastolic CHF complicated by recurrent abdominal distension and pain 2/2 recurrent painful, tense ascites s/p successful U/S-guided therapeutic LVP with local anesthetic only and albumin infusion post per protocol.      Patient tolerated the procedure well. No immediate post-procedural complications noted.      Thank you for considering IR for the care of your patient.      Zach Cha MD  Interventional Radiology

## 2019-12-02 NOTE — NURSING
Report called to [facility] nurse Tory. Pt to receive Albumin. 7100 mL drained from abdomen.

## 2019-12-02 NOTE — H&P
Radiology History & Physical      SUBJECTIVE:     Chief Complaint: Recurrent painful, tense ascites     History of Present Illness:  Marvin Ray is a 58 y.o. male with PMHx of CKD III and combined systolic/diastolic CHF complicated by recurrent abdominal distension and pain 2/2 recurrent painful, tense ascites without TTP on PE requiring frequent large-volume therapeutic paracentesis.     A new outpatient IR consult placed for US-guided therapeutic paracentesis.        Past Medical History:   Diagnosis Date    Arthritis     Cellulitis     CKD (chronic kidney disease), stage III     Coronary artery disease     Diabetes mellitus     Diabetic retinopathy     Diabetic ulcer of left foot     Glaucoma     Gout     Hyperlipemia     Hypertension     ICD (implantable cardioverter-defibrillator) in place 11/02/2018    Left chest    Non-pressure chronic ulcer of other part of left foot with fat layer exposed 10/23/2018    PVD (peripheral vascular disease)     Type 2 diabetes mellitus with left diabetic foot ulcer 10/29/2018    Unsteady gait     uses a wheelchair     Past Surgical History:   Procedure Laterality Date    AHMED GLAUCOMA IMPLANT Left 2011    DONE AT [facility]    AORTOGRAPHY WITH SERIALOGRAPHY Left 4/30/2019    Procedure: AORTOGRAM, WITH SERIALOGRAPHY, LEFT LOWER EXTREMITY, POSSIBLE INTERVENTION;  Surgeon: Mitch Chan MD;  Location: [facility];  Service: Vascular;  Laterality: Left;  RN PRE OP 4-23-19.  NO H & P, No Cosent  [place]    BAERVELDT GLAUCOMA IMPLANT Left 2012    WITH CATARACT EXTRACTION//DONE AT [facility]    CARDIAC CATHETERIZATION Left 05/2016    CARDIAC DEFIBRILLATOR PLACEMENT Left 11/02/2018    CATARACT EXTRACTION W/  INTRAOCULAR LENS IMPLANT Left 2012    WITH BAERVEDT//DONE AT [facility]    CATARACT EXTRACTION W/  INTRAOCULAR LENS IMPLANT Right 09/26/2018    COMPLEX ()    CB DESTRUCTION WITH CYCLO G6 Left 02/15/2017        CYST REMOVAL       DEBRIDEMENT OF FOOT  12/28/2018    Procedure: DEBRIDEMENT, FOOT;  Surgeon: Mitch Wall MD;  Location: [facility] OR;  Service: Vascular;;    DEBRIDEMENT OF FOOT  6/5/2019    Procedure: DEBRIDEMENT, FOOT;  Surgeon: Mitch Wall MD;  Location: [facility] OR;  Service: Vascular;;    EXAMINATION UNDER ANESTHESIA Left 12/5/2018    Procedure: Exam under anesthesia, left foot debridement, washout and all other indicated procedures;  Surgeon: Mitch Wall MD;  Location: [facility] OR;  Service: Vascular;  Laterality: Left;  1030AM START  RN PREOP 12/3/2018-----AICD  SEEN BY DR JAMES 12/4    EXAMINATION UNDER ANESTHESIA Left 2/13/2019    Procedure: LEFT FOOT WOUND DEBRIDEMENT, WASHOUT AND ALL OTHER INDICATED PROCEDURES;  Surgeon: Mitch Wall MD;  Location: [facility] OR;  Service: Vascular;  Laterality: Left;  requesting 1st case start  THIS CASE 1ST PER DR WALL ON 2/1/19 @ 844AM -[place]  RN PREOP 2/6/2019  HAS CARDIAC CLEARANCE    EXAMINATION UNDER ANESTHESIA Left 12/28/2018    Procedure: Exam under anesthesia, left foot debridement, left foot washout, possible left second through fifth metatarsal resection;  Surgeon: Mitch Wall MD;  Location: [facility] OR;  Service: Vascular;  Laterality: Left;  1:00pm start per julissa @ 8:58am  RN  PHONE PRE OP 12-27-18--=Pt coming from [facility] on [address] [facility]  NEED CONSENT    EXAMINATION UNDER ANESTHESIA Left 6/5/2019    Procedure: LEFT FOOT DEBRIDEMENT, WASHOUT AND ALL OTHER INDICATED PROCEDURES;  Surgeon: Mitch Wall MD;  Location: [facility] OR;  Service: Vascular;  Laterality: Left;  RN PRE OP 5-31-19------ICD------NEED CONSENT    INCISION AND DRAINAGE Left 11/14/2018    Procedure: Incision and Drainage, left lower extremity debridement, washout;  Surgeon: Mitch Wall MD;  Location: [facility] OR;  Service: Vascular;  Laterality: Left;    INCISION AND DRAINAGE Left 11/16/2018    Procedure: INCISION AND DRAINAGE;  Surgeon: Mitch Wall MD;  Location:  [facility] OR;  Service: Vascular;  Laterality: Left;    INTRAOCULAR PROSTHESES INSERTION Right 9/26/2018    Procedure: INSERTION, IOL PROSTHESIS;  Surgeon: Perla Cortés MD;  Location: [facility] OR 1ST FLR;  Service: Ophthalmology;  Laterality: Right;    PERITONEOCENTESIS N/A 3/1/2019    Procedure: PARACENTESIS, ABDOMINAL, INITIAL;  Surgeon: Dosc Diagnostic Provider;  Location: [facility] OR;  Service: Radiology;  Laterality: N/A;    PERITONEOCENTESIS N/A 3/25/2019    Procedure: PARACENTESIS, ABDOMINAL, INITIAL;  Surgeon: Dosc Diagnostic Provider;  Location: [facility] OR;  Service: Radiology;  Laterality: N/A;    PERITONEOCENTESIS N/A 7/22/2019    Procedure: PARACENTESIS, ABDOMINAL, INITIAL;  Surgeon: Dosc Diagnostic Provider;  Location: [facility] OR;  Service: Radiology;  Laterality: N/A;    PERITONEOCENTESIS N/A 8/16/2019    Procedure: PARACENTESIS, ABDOMINAL, INITIAL;  Surgeon: Dosc Diagnostic Provider;  Location: [facility] OR;  Service: Radiology;  Laterality: N/A;    PERITONEOCENTESIS N/A 9/26/2019    Procedure: PARACENTESIS, ABDOMINAL;  Surgeon: Dosc Diagnostic Provider;  Location: [facility] OR;  Service: Radiology;  Laterality: N/A;    PERITONEOCENTESIS N/A 10/11/2019    Procedure: PARACENTESIS, ABDOMINAL;  Surgeon: Dosc Diagnostic Provider;  Location: [facility] OR;  Service: Radiology;  Laterality: N/A;    PERITONEOCENTESIS N/A 10/31/2019    Procedure: PARACENTESIS, ABDOMINAL;  Surgeon: Dosc Diagnostic Provider;  Location: [facility] OR;  Service: Radiology;  Laterality: N/A;    PERITONEOCENTESIS N/A 11/18/2019    Procedure: PARACENTESIS, ABDOMINAL;  Surgeon: Dosc Diagnostic Provider;  Location: [facility] OR;  Service: Radiology;  Laterality: N/A;    PHACOEMULSIFICATION OF CATARACT Right 9/26/2018    Procedure: PHACOEMULSIFICATION, CATARACT;  Surgeon: Perla Cortés MD;  Location: [facility] OR 1ST FLR;  Service: Ophthalmology;  Laterality: Right;    REPEAT ABDOMINAL PARACENTESIS N/A 2/11/2019    Procedure: PARACENTESIS, ABDOMINAL, REPEAT;   Surgeon: [facility] Diagnostic Provider;  Location: [facility] OR;  Service: Radiology;  Laterality: N/A;  1PM START    REPEAT ABDOMINAL PARACENTESIS N/A 9/12/2019    Procedure: PARACENTESIS, ABDOMINAL, REPEAT;  Surgeon: [facility] Diagnostic Provider;  Location: [facility] OR;  Service: Radiology;  Laterality: N/A;  9AM START    REVISION OF PROCEDURE INVOLVING GLAUCOMA DRAINAGE DEVICE Left 10/2/2019    EXTRUDING BAERVELDT /WITH PERICARDIAL PATCH GRAFT ()    Right foot surgery  10/2014    TOE AMPUTATION Right     first and second    TOE AMPUTATION Left 11/16/2018    Procedure: AMPUTATION, TOES  2-5;  Surgeon: Mitch Chan MD;  Location: [facility] OR;  Service: Vascular;  Laterality: Left;    TOE AMPUTATION Left 12/5/2018    Procedure: AMPUTATION, TOE;  Surgeon: Mitch Chan MD;  Location: [facility] OR;  Service: Vascular;  Laterality: Left;  left great toe    TONSILLECTOMY         Home Meds:   Prior to Admission medications    Medication Sig Start Date End Date Taking? Authorizing Provider   allopurinol (ZYLOPRIM) 300 MG tablet Take 1 tablet (300 mg total) by mouth once daily. 9/27/19   Rubén Davis MD   aspirin 325 MG tablet Take 325 mg by mouth once daily.     Historical Provider, MD   atropine 1% (ISOPTO ATROPINE) 1 % Drop Place 1 drop into the left eye once daily.    Perla Cortés MD   bisacodyl (DULCOLAX) 5 mg EC tablet Take 5 mg by mouth daily as needed for Constipation.    Historical Provider, MD   brimonidine 0.2% (ALPHAGAN) 0.2 % Drop Place 1 drop into both eyes 2 (two) times daily.  Patient taking differently: Place 1 drop into both eyes 3 (three) times daily.  2/8/19   Perla Cortés MD   carvedilol (COREG) 3.125 MG tablet Take 1 tablet (3.125 mg total) by mouth 2 (two) times daily. 1/23/19 1/23/20  Sara Ching MD   coenzyme Q10 100 mg capsule Take 100 mg by mouth every morning.    Historical Provider, MD   dorzolamide-timolol 2-0.5% (COSOPT) 22.3-6.8 mg/mL ophthalmic solution Place  "1 drop into both eyes 2 (two) times daily.  Patient taking differently: Place 1 drop into both eyes 3 (three) times daily.  2/8/19 2/8/20  Perla Cortés MD   doxycycline (VIBRAMYCIN) 100 MG Cap Take 1 capsule (100 mg total) by mouth every 12 (twelve) hours. 8/6/19   Leonidas Diaz MD   ezetimibe (ZETIA) 10 mg tablet TAKE 1 TABLET(10 MG) BY MOUTH EVERY DAY 10/24/19   Rubén Davis MD   fish oil-omega-3 fatty acids 300-1,000 mg capsule Take 1 capsule by mouth 2 (two) times daily. 1/23/19   Sara Ching MD   insulin aspart U-100 (NOVOLOG) 100 unit/mL InPn pen Use on premeal readings: 150-200=+2, 201-250=+4; 251-300=+6; 301-350=+8, over 350=+10 units 1/31/19   Sheryl Madison NP   insulin degludec-liraglutide (XULTOPHY 100/3.6) 100 unit-3.6 mg /mL (3 mL) InPn Inject 42 Units into the skin once daily. 2/6/19   Sheryl Madison NP   MULTIVIT,THER IRON,CA,FA & MIN (MULTIVITAMIN) Tab Take 1 tablet by mouth every morning.     Historical Provider, MD   pen needle, diabetic 31 gauge x 1/4" Ndle Use as directed 8/11/16   Mkie Bass MD   prednisoLONE acetate (PRED FORTE) 1 % DrpS Place 1 drop into the left eye every morning. 10/10/19   Perla Cortés MD   torsemide (DEMADEX) 20 MG Tab TAKE 4 TABLETS BY MOUTH TWICE DAILY 12/1/19   Rubén Davis MD     Anticoagulants/Antiplatelets: no anticoagulation    Allergies:   Review of patient's allergies indicates:   Allergen Reactions    Statins-hmg-coa reductase inhibitors      Generalized Pain    Onglyza [saxagliptin]     Penicillins Rash     Sedation History:  no adverse reactions     Review of Systems:   Hematological: no known coagulopathies  Respiratory: no cough, shortness of breath, or wheezing  Cardiovascular: no chest pain or dyspnea on exertion  Gastrointestinal: no abdominal pain, change in bowel habits, or black or bloody stools  Genito-Urinary: no dysuria, trouble voiding, or hematuria  Musculoskeletal: negative  Neurological: no TIA or stroke " symptoms         OBJECTIVE:     Vital Signs (Most Recent)     Physical Exam:  ASA: III  Mallampati: III     General: no acute distress  Mental Status: alert and oriented to person, place and time  HEENT: normocephalic, atraumatic  Chest: mild labored breathing  Heart: regular heart rate  Abdomen: +distended. No TTP.  Extremity: moves all extremities      Laboratory  Lab Results   Component Value Date    INR 1.1 05/31/2019       Lab Results   Component Value Date    WBC 5.13 08/22/2019    HGB 9.9 (L) 08/22/2019    HCT 32.0 (L) 08/22/2019    MCV 86 08/22/2019     08/22/2019      Lab Results   Component Value Date    GLU 83 08/22/2019     08/22/2019    K 3.6 08/22/2019     08/22/2019    CO2 28 08/22/2019    BUN 39 (H) 08/22/2019    CREATININE 1.1 08/22/2019    CALCIUM 8.8 08/22/2019    MG 2.1 05/31/2019    ALT 12 08/22/2019    AST 14 08/22/2019    ALBUMIN 2.8 (L) 08/22/2019    BILITOT 0.5 08/22/2019       ASSESSMENT/PLAN:     59 y/o M with CKD III and combined systolic/diastolic CHF complicated by recurrent abdominal distension and pain 2/2 recurrent painful, tense ascites requiring frequent large-volume therapeutic paracentesis.      1. Will attempt U/S-guided therapeutic paracentesis with local anesthetic only and albumin infusion post PRN per protocol.      Risks (including, but not limited to, pain, bleeding, infection, damage to nearby structures, failure to obtain sufficient material for a diagnosis, the need for additional procedures, and death), benefits, and alternatives were discussed with the patient. All questions were answered to the best of my abilities. The patient wishes to proceed with the procedure. Written informed consent was obtained.     Thank you for considering IR for the care of your patient.      Zach Cha MD  Interventional Radiology

## 2019-12-03 ENCOUNTER — HOSPITAL ENCOUNTER (OUTPATIENT)
Dept: WOUND CARE | Facility: HOSPITAL | Age: 59
Discharge: HOME | End: 2019-12-03
Attending: FAMILY MEDICINE
Payer: MEDICAID

## 2019-12-03 DIAGNOSIS — E11.621 TYPE 2 DIABETES MELLITUS WITH LEFT DIABETIC FOOT ULCER: Primary | ICD-10-CM

## 2019-12-03 DIAGNOSIS — L97.529 TYPE 2 DIABETES MELLITUS WITH LEFT DIABETIC FOOT ULCER: Primary | ICD-10-CM

## 2019-12-03 PROCEDURE — 99214 PR OFFICE/OUTPT VISIT, EST, LEVL IV, 30-39 MIN: ICD-10-PCS | Mod: ,,, | Performed by: FAMILY MEDICINE

## 2019-12-03 PROCEDURE — 97602 WOUND(S) CARE NON-SELECTIVE: CPT | Performed by: FAMILY MEDICINE

## 2019-12-03 PROCEDURE — 99214 OFFICE O/P EST MOD 30 MIN: CPT | Mod: ,,, | Performed by: FAMILY MEDICINE

## 2019-12-05 ENCOUNTER — TELEPHONE (OUTPATIENT)
Dept: HOME HEALTH SERVICES | Facility: HOSPITAL | Age: 59
End: 2019-12-05

## 2019-12-09 ENCOUNTER — PATIENT OUTREACH (OUTPATIENT)
Dept: ADMINISTRATIVE | Facility: OTHER | Age: 59
End: 2019-12-09

## 2019-12-09 NOTE — PROGRESS NOTES
CC: This 59 y.o. White male  is here for evaluation of  T2DM along with comorbidities indicated in the Visit Diagnosis section of this encounter.    HPI: Marvin Ray was diagnosed with T2DM in his late 20s. Pt was initially diet controlled, then required oral medications, then eventually required insulin.   A1c in July 2018 was high at > 14%  because he couldn't afford insulin (Lantus ~$600).      Interval history arrives with his sister Chloé.  Last seen 6 months ago. Last a1c was 6% in July. States BGs have been doing well.     Continues to see Dr. Davis for wound care to left foot.   Gets paracentesis regularly every few weeks.     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 Also suffered cardiac arrest in Dec 2018.     LAST DIABETES EDUCATION: years ago     PRESCRIBED DIABETES MEDICATIONS:  Xultophy 34 units every morning, Novolog per sliding scale - Use on premeal readings: 150-200=+2, 201-250=+4; 251-300=+6; 301-350=+8, over 350=+10 units      Misses medication doses - No    DM COMPLICATIONS: nephropathy, retinopathy and peripheral neuropathy  toe amputations (2/2 infection)    SIGNIFICANT DIABETES MED HISTORY:   Constipation on Onglyza     SELF MONITORING BLOOD GLUCOSE: Tests BG 1x/day in the mornings.   Before breakfast 90-110s  After breakfast 115-173, a few BGs in low 200s      HYPOGLYCEMIC EPISODES: recalls just one episode when he felt his BG drop, presumably related to not eating carbs for dinner. Corrected with juice.      CURRENT DIET: drinks water and black tea with lemon. Eats 3 meals/day.     Breakfast is 3 eggs with a slice of toast, sometimes oatmeal or grits. Likes protein shakes.         /63 (BP Location: Right arm, Patient Position: Sitting, BP Method: Large (Automatic))   Pulse 80   Wt 87.1 kg (192 lb 0.3 oz)   BMI 27.55 kg/m²       ROS:   CONSTITUTIONAL: Appetite good, denies fatigue  RESPIRATORY: No shortness of breath  CARDIAC: No chest pain  OTHER:  n/a      PHYSICAL EXAM:  GENERAL: Well developed, well nourished. No acute distress.   PSYCH: AAOx3, appropriate mood and affect, conversant, casually-groomed. Judgement and insight good. Appears chronically ill. Arrives in w/c   NEURO: Cranial nerves grossly intact. Speech clear, no tremor.   CHEST: Respirations even and unlabored.         Hemoglobin A1C   Date Value Ref Range Status   07/22/2019 6.0 (H) 4.0 - 5.6 % Final     Comment:     ADA Screening Guidelines:  5.7-6.4%  Consistent with prediabetes  >or=6.5%  Consistent with diabetes  High levels of fetal hemoglobin interfere with the HbA1C  assay. Heterozygous hemoglobin variants (HbS, HgC, etc)do  not significantly interfere with this assay.   However, presence of multiple variants may affect accuracy.     03/11/2019 6.7 (H) 4.0 - 5.6 % Final     Comment:     ADA Screening Guidelines:  5.7-6.4%  Consistent with prediabetes  >or=6.5%  Consistent with diabetes  High levels of fetal hemoglobin interfere with the HbA1C  assay. Heterozygous hemoglobin variants (HbS, HgC, etc)do  not significantly interfere with this assay.   However, presence of multiple variants may affect accuracy.     10/30/2018 8.3 (H) 4.0 - 5.6 % Final     Comment:     ADA Screening Guidelines:  5.7-6.4%  Consistent with prediabetes  >or=6.5%  Consistent with diabetes  High levels of fetal hemoglobin interfere with the HbA1C  assay. Heterozygous hemoglobin variants (HbS, HgC, etc)do  not significantly interfere with this assay.   However, presence of multiple variants may affect accuracy.             Chemistry        Component Value Date/Time     08/22/2019 0820    K 3.6 08/22/2019 0820     08/22/2019 0820    CO2 28 08/22/2019 0820    BUN 39 (H) 08/22/2019 0820    CREATININE 1.1 08/22/2019 0820    GLU 83 08/22/2019 0820        Component Value Date/Time    CALCIUM 8.8 08/22/2019 0820    ALKPHOS 135 08/22/2019 0820    AST 14 08/22/2019 0820    ALT 12 08/22/2019 0820    BILITOT 0.5  08/22/2019 0820    ESTGFRAFRICA >60 08/22/2019 0820    EGFRNONAA >60 08/22/2019 0820          Lab Results   Component Value Date    LDLCALC 71.2 10/30/2018         Lab Results   Component Value Date    MICALBCREAT 4564.0 (H) 07/27/2018           STANDARDS of CARE:        ASA:               Last eye exam:       ASSESSMENT and PLAN:    A1C GOAL: < 7 %     1. DM type 2 with diabetic peripheral neuropathy  Discontinue Novolog sliding scale. He has not been using anyway.   Continue testing blood sugar once a day.   Call with any issues including if blood sugar drops less than 80.   Will draw A1c today. If A1c is very low, will drop his Xultophy dose.   Return to clinic in 6 months with labs prior.       Hemoglobin A1c    Hemoglobin A1c   2. Essential hypertension  controlled   3. PVD (peripheral vascular disease)  Maintain glucose control.        Orders Placed This Encounter   Procedures    Hemoglobin A1c     Standing Status:   Future     Standing Expiration Date:   2/7/2021    Hemoglobin A1c     Standing Status:   Future     Standing Expiration Date:   2/7/2021        Follow up in about 6 months (around 6/10/2020).

## 2019-12-10 ENCOUNTER — OFFICE VISIT (OUTPATIENT)
Dept: ENDOCRINOLOGY | Facility: CLINIC | Age: 59
End: 2019-12-10
Payer: COMMERCIAL

## 2019-12-10 ENCOUNTER — LAB VISIT (OUTPATIENT)
Dept: LAB | Facility: HOSPITAL | Age: 59
End: 2019-12-10
Attending: NURSE PRACTITIONER
Payer: MEDICAID

## 2019-12-10 VITALS
WEIGHT: 192 LBS | DIASTOLIC BLOOD PRESSURE: 63 MMHG | SYSTOLIC BLOOD PRESSURE: 111 MMHG | HEART RATE: 80 BPM | BODY MASS INDEX: 27.55 KG/M2

## 2019-12-10 DIAGNOSIS — I10 ESSENTIAL HYPERTENSION: ICD-10-CM

## 2019-12-10 DIAGNOSIS — E11.42 DM TYPE 2 WITH DIABETIC PERIPHERAL NEUROPATHY: Primary | ICD-10-CM

## 2019-12-10 DIAGNOSIS — I73.9 PVD (PERIPHERAL VASCULAR DISEASE): ICD-10-CM

## 2019-12-10 DIAGNOSIS — E11.42 DM TYPE 2 WITH DIABETIC PERIPHERAL NEUROPATHY: ICD-10-CM

## 2019-12-10 LAB
ESTIMATED AVG GLUCOSE: 169 MG/DL (ref 68–131)
HBA1C MFR BLD HPLC: 7.5 % (ref 4–5.6)

## 2019-12-10 PROCEDURE — 99214 PR OFFICE/OUTPT VISIT, EST, LEVL IV, 30-39 MIN: ICD-10-PCS | Mod: S$GLB,,, | Performed by: NURSE PRACTITIONER

## 2019-12-10 PROCEDURE — 36415 COLL VENOUS BLD VENIPUNCTURE: CPT | Mod: PN

## 2019-12-10 PROCEDURE — 99214 OFFICE O/P EST MOD 30 MIN: CPT | Mod: S$GLB,,, | Performed by: NURSE PRACTITIONER

## 2019-12-10 PROCEDURE — 99999 PR PBB SHADOW E&M-EST. PATIENT-LVL IV: CPT | Mod: PBBFAC,,, | Performed by: NURSE PRACTITIONER

## 2019-12-10 PROCEDURE — 99999 PR PBB SHADOW E&M-EST. PATIENT-LVL IV: ICD-10-PCS | Mod: PBBFAC,,, | Performed by: NURSE PRACTITIONER

## 2019-12-10 PROCEDURE — 83036 HEMOGLOBIN GLYCOSYLATED A1C: CPT

## 2019-12-10 NOTE — PATIENT INSTRUCTIONS
Discontinue Novolog sliding scale. He has not been using anyway.   Continue testing blood sugar once a day.   Call with any issues including if blood sugar drops less than 80.   Will draw A1c today. If A1c is very low, will drop his Xultophy dose.   Return to clinic in 6 months with labs prior.

## 2019-12-11 ENCOUNTER — TELEPHONE (OUTPATIENT)
Dept: ENDOCRINOLOGY | Facility: CLINIC | Age: 59
End: 2019-12-11

## 2019-12-11 NOTE — TELEPHONE ENCOUNTER
Called pt to inform him of his lab results. Your A1c is higher now at 7.5% which still indicates adequate blood sugar control at this time. We don't want it to go much higher than this though so continue the same Xultophy dose. No need for Novolog. Pt stated he understood the results and that he would watch what he eats. Pt stated he did not have any questions at this time. Pt informed he may call the office if he has questions.

## 2019-12-11 NOTE — TELEPHONE ENCOUNTER
----- Message from Sheryl Madison NP sent at 12/11/2019  9:21 AM CST -----  Your A1c is higher now at 7.5% which still indicates adequate glucose control at this time. We don't want it to go much higher than this though so continue the same Xultophy dose. No need for Novolog.

## 2019-12-12 ENCOUNTER — OFFICE VISIT (OUTPATIENT)
Dept: OPHTHALMOLOGY | Facility: CLINIC | Age: 59
End: 2019-12-12
Payer: MEDICAID

## 2019-12-12 ENCOUNTER — TELEPHONE (OUTPATIENT)
Dept: ENDOCRINOLOGY | Facility: CLINIC | Age: 59
End: 2019-12-12

## 2019-12-12 DIAGNOSIS — Z48.89 ENCOUNTER FOR POSTOPERATIVE CARE: Primary | ICD-10-CM

## 2019-12-12 DIAGNOSIS — Z96.89 HISTORY OF GLAUCOMA TUBE SHUNT PROCEDURE: ICD-10-CM

## 2019-12-12 DIAGNOSIS — T85.9XXA DISORDER DUE TO INSERTION OF GLAUCOMA DRAINAGE DEVICE: ICD-10-CM

## 2019-12-12 DIAGNOSIS — H40.52X3 NEOVASCULAR GLAUCOMA OF LEFT EYE, SEVERE STAGE: ICD-10-CM

## 2019-12-12 DIAGNOSIS — Z96.1 PSEUDOPHAKIA: ICD-10-CM

## 2019-12-12 DIAGNOSIS — H21.1X1 RUBEOSIS IRIDIS, RIGHT: ICD-10-CM

## 2019-12-12 DIAGNOSIS — H40.51X1 NEOVASCULAR GLAUCOMA, RIGHT EYE, MILD STAGE: ICD-10-CM

## 2019-12-12 DIAGNOSIS — H35.373 EPIRETINAL MEMBRANE, BOTH EYES: ICD-10-CM

## 2019-12-12 PROCEDURE — 99999 PR PBB SHADOW E&M-EST. PATIENT-LVL II: ICD-10-PCS | Mod: PBBFAC,,, | Performed by: OPHTHALMOLOGY

## 2019-12-12 PROCEDURE — 99024 PR POST-OP FOLLOW-UP VISIT: ICD-10-PCS | Mod: ,,, | Performed by: OPHTHALMOLOGY

## 2019-12-12 PROCEDURE — 99212 OFFICE O/P EST SF 10 MIN: CPT | Mod: PBBFAC | Performed by: OPHTHALMOLOGY

## 2019-12-12 PROCEDURE — 99024 POSTOP FOLLOW-UP VISIT: CPT | Mod: ,,, | Performed by: OPHTHALMOLOGY

## 2019-12-12 PROCEDURE — 99999 PR PBB SHADOW E&M-EST. PATIENT-LVL II: CPT | Mod: PBBFAC,,, | Performed by: OPHTHALMOLOGY

## 2019-12-12 RX ORDER — BRIMONIDINE TARTRATE 2 MG/ML
1 SOLUTION/ DROPS OPHTHALMIC 3 TIMES DAILY
Qty: 10 ML | Refills: 11 | Status: SHIPPED | OUTPATIENT
Start: 2019-12-12 | End: 2020-12-31

## 2019-12-12 RX ORDER — DORZOLAMIDE HYDROCHLORIDE AND TIMOLOL MALEATE 20; 5 MG/ML; MG/ML
1 SOLUTION/ DROPS OPHTHALMIC 3 TIMES DAILY
Qty: 1 BOTTLE | Refills: 11 | Status: SHIPPED | OUTPATIENT
Start: 2019-12-12 | End: 2021-02-12 | Stop reason: SDUPTHER

## 2019-12-12 RX ORDER — ASPIRIN 81 MG/1
81 TABLET ORAL DAILY
COMMUNITY

## 2019-12-16 ENCOUNTER — HOSPITAL ENCOUNTER (OUTPATIENT)
Facility: HOSPITAL | Age: 59
Discharge: HOME | End: 2019-12-16
Attending: RADIOLOGY | Admitting: RADIOLOGY
Payer: MEDICAID

## 2019-12-16 VITALS — DIASTOLIC BLOOD PRESSURE: 66 MMHG | SYSTOLIC BLOOD PRESSURE: 132 MMHG

## 2019-12-16 DIAGNOSIS — R18.8 OTHER ASCITES: ICD-10-CM

## 2019-12-16 NOTE — H&P
Radiology History & Physical      SUBJECTIVE:     Chief Complaint: ascites, hx of CHF, CKD    History of Present Illness:  Marvin Ray is a 59 y.o. male who presents for paracentesis  Past Medical History:   Diagnosis Date    Arthritis     Cellulitis     CKD (chronic kidney disease), stage III     Coronary artery disease     Diabetes mellitus     Diabetic retinopathy     Diabetic ulcer of left foot     Glaucoma     Gout     Hyperlipemia     Hypertension     ICD (implantable cardioverter-defibrillator) in place 11/02/2018    Left chest    Non-pressure chronic ulcer of other part of left foot with fat layer exposed 10/23/2018    PVD (peripheral vascular disease)     Type 2 diabetes mellitus with left diabetic foot ulcer 10/29/2018    Unsteady gait     uses a wheelchair     Past Surgical History:   Procedure Laterality Date    AHMED GLAUCOMA IMPLANT Left 2011    DONE AT [facility]    AORTOGRAPHY WITH SERIALOGRAPHY Left 4/30/2019    Procedure: AORTOGRAM, WITH SERIALOGRAPHY, LEFT LOWER EXTREMITY, POSSIBLE INTERVENTION;  Surgeon: Mitch Chan MD;  Location: [facility] OR;  Service: Vascular;  Laterality: Left;  RN PRE OP 4-23-19.  NO H & P, No Cosent  [place]    BAERVELDT GLAUCOMA IMPLANT Left 2012    WITH CATARACT EXTRACTION//DONE AT [facility]    CARDIAC CATHETERIZATION Left 05/2016    CARDIAC DEFIBRILLATOR PLACEMENT Left 11/02/2018    CATARACT EXTRACTION W/  INTRAOCULAR LENS IMPLANT Left 2012    WITH BAERVEDT//DONE AT [facility]    CATARACT EXTRACTION W/  INTRAOCULAR LENS IMPLANT Right 09/26/2018    COMPLEX ()    CB DESTRUCTION WITH CYCLO G6 Left 02/15/2017        CYST REMOVAL      DEBRIDEMENT OF FOOT  12/28/2018    Procedure: DEBRIDEMENT, FOOT;  Surgeon: Mitch Chan MD;  Location: [facility] OR;  Service: Vascular;;    DEBRIDEMENT OF FOOT  6/5/2019    Procedure: DEBRIDEMENT, FOOT;  Surgeon: Mitch Chan MD;  Location: [facility] OR;  Service: Vascular;;     EXAMINATION UNDER ANESTHESIA Left 12/5/2018    Procedure: Exam under anesthesia, left foot debridement, washout and all other indicated procedures;  Surgeon: Mitch Wall MD;  Location: [facility] OR;  Service: Vascular;  Laterality: Left;  1030AM START  RN PREOP 12/3/2018-----AICD  SEEN BY DR JAMES 12/4    EXAMINATION UNDER ANESTHESIA Left 2/13/2019    Procedure: LEFT FOOT WOUND DEBRIDEMENT, WASHOUT AND ALL OTHER INDICATED PROCEDURES;  Surgeon: Mitch Wall MD;  Location: [facility] OR;  Service: Vascular;  Laterality: Left;  requesting 1st case start  THIS CASE 1ST PER DR WALL ON 2/1/19 @ 844AM -[place]  RN PREOP 2/6/2019  HAS CARDIAC CLEARANCE    EXAMINATION UNDER ANESTHESIA Left 12/28/2018    Procedure: Exam under anesthesia, left foot debridement, left foot washout, possible left second through fifth metatarsal resection;  Surgeon: Mitch Wall MD;  Location: [facility] OR;  Service: Vascular;  Laterality: Left;  1:00pm start per julissa @ 8:58am  RN  PHONE PRE OP 12-27-18--=Pt coming from [facility]  NEED CONSENT    EXAMINATION UNDER ANESTHESIA Left 6/5/2019    Procedure: LEFT FOOT DEBRIDEMENT, WASHOUT AND ALL OTHER INDICATED PROCEDURES;  Surgeon: Mitch Wall MD;  Location: [facility] OR;  Service: Vascular;  Laterality: Left;  RN PRE OP 5-31-19------ICD------NEED CONSENT    INCISION AND DRAINAGE Left 11/14/2018    Procedure: Incision and Drainage, left lower extremity debridement, washout;  Surgeon: Mitch Wall MD;  Location: [facility] OR;  Service: Vascular;  Laterality: Left;    INCISION AND DRAINAGE Left 11/16/2018    Procedure: INCISION AND DRAINAGE;  Surgeon: Mitch Wall MD;  Location: [facility] OR;  Service: Vascular;  Laterality: Left;    INTRAOCULAR PROSTHESES INSERTION Right 9/26/2018    Procedure: INSERTION, IOL PROSTHESIS;  Surgeon: Perla Cortés MD;  Location: [facility] OR [address] [place] [address];  Service: Ophthalmology;  Laterality: Right;    PERITONEOCENTESIS N/A 3/1/2019     Procedure: PARACENTESIS, ABDOMINAL, INITIAL;  Surgeon: Dosc Diagnostic Provider;  Location: [facility] OR;  Service: Radiology;  Laterality: N/A;    PERITONEOCENTESIS N/A 3/25/2019    Procedure: PARACENTESIS, ABDOMINAL, INITIAL;  Surgeon: Dosc Diagnostic Provider;  Location: [facility] OR;  Service: Radiology;  Laterality: N/A;    PERITONEOCENTESIS N/A 7/22/2019    Procedure: PARACENTESIS, ABDOMINAL, INITIAL;  Surgeon: Dosc Diagnostic Provider;  Location: [facility] OR;  Service: Radiology;  Laterality: N/A;    PERITONEOCENTESIS N/A 8/16/2019    Procedure: PARACENTESIS, ABDOMINAL, INITIAL;  Surgeon: Dosc Diagnostic Provider;  Location: [facility] OR;  Service: Radiology;  Laterality: N/A;    PERITONEOCENTESIS N/A 9/26/2019    Procedure: PARACENTESIS, ABDOMINAL;  Surgeon: Dosc Diagnostic Provider;  Location: [facility] OR;  Service: Radiology;  Laterality: N/A;    PERITONEOCENTESIS N/A 10/11/2019    Procedure: PARACENTESIS, ABDOMINAL;  Surgeon: Dosc Diagnostic Provider;  Location: [facility] OR;  Service: Radiology;  Laterality: N/A;    PERITONEOCENTESIS N/A 10/31/2019    Procedure: PARACENTESIS, ABDOMINAL;  Surgeon: Dosc Diagnostic Provider;  Location: [facility] OR;  Service: Radiology;  Laterality: N/A;    PERITONEOCENTESIS N/A 11/18/2019    Procedure: PARACENTESIS, ABDOMINAL;  Surgeon: Dosc Diagnostic Provider;  Location: [facility] OR;  Service: Radiology;  Laterality: N/A;    PHACOEMULSIFICATION OF CATARACT Right 9/26/2018    Procedure: PHACOEMULSIFICATION, CATARACT;  Surgeon: Perla Cortés MD;  Location: [facility] OR [address] [place] [address];  Service: Ophthalmology;  Laterality: Right;    REPEAT ABDOMINAL PARACENTESIS N/A 2/11/2019    Procedure: PARACENTESIS, ABDOMINAL, REPEAT;  Surgeon: Dosc Diagnostic Provider;  Location: [facility] OR;  Service: Radiology;  Laterality: N/A;  1PM START    REPEAT ABDOMINAL PARACENTESIS N/A 9/12/2019    Procedure: PARACENTESIS, ABDOMINAL, REPEAT;  Surgeon: Dosc Diagnostic Provider;  Location: [facility] OR;  Service: Radiology;   Laterality: N/A;  9AM START    REPEAT ABDOMINAL PARACENTESIS N/A 12/2/2019    Procedure: PARACENTESIS, ABDOMINAL, REPEAT;  Surgeon: Winston Diagnostic Provider;  Location: [facility] OR;  Service: Radiology;  Laterality: N/A;  3PM START    REVISION OF PROCEDURE INVOLVING GLAUCOMA DRAINAGE DEVICE Left 10/2/2019    EXTRUDING BAERVELDT /WITH PERICARDIAL PATCH GRAFT ()    Right foot surgery  10/2014    TOE AMPUTATION Right     first and second    TOE AMPUTATION Left 11/16/2018    Procedure: AMPUTATION, TOES  2-5;  Surgeon: Mitch Chan MD;  Location: [facility] OR;  Service: Vascular;  Laterality: Left;    TOE AMPUTATION Left 12/5/2018    Procedure: AMPUTATION, TOE;  Surgeon: Mitch Chan MD;  Location: [facility] OR;  Service: Vascular;  Laterality: Left;  left great toe    TONSILLECTOMY         Home Meds:   Prior to Admission medications    Medication Sig Start Date End Date Taking? Authorizing Provider   allopurinol (ZYLOPRIM) 300 MG tablet Take 1 tablet (300 mg total) by mouth once daily. 9/27/19   Rubén Davis MD   aspirin (ECOTRIN) 81 MG EC tablet Take 81 mg by mouth once daily.    Historical Provider, MD   aspirin 325 MG tablet Take 325 mg by mouth once daily.     Historical Provider, MD   atropine 1% (ISOPTO ATROPINE) 1 % Drop Place 1 drop into the left eye once daily.    Perla Cortés MD   bisacodyl (DULCOLAX) 5 mg EC tablet Take 5 mg by mouth daily as needed for Constipation.    Historical Provider, MD   brimonidine 0.2% (ALPHAGAN) 0.2 % Drop Place 1 drop into both eyes 3 (three) times daily. 12/12/19   Perla Cortés MD   carvedilol (COREG) 3.125 MG tablet Take 1 tablet (3.125 mg total) by mouth 2 (two) times daily. 1/23/19 1/23/20  Sara Ching MD   coenzyme Q10 100 mg capsule Take 100 mg by mouth every morning.    Historical Provider, MD   dorzolamide-timolol 2-0.5% (COSOPT) 22.3-6.8 mg/mL ophthalmic solution Place 1 drop into both eyes 3 (three) times daily. 12/12/19    "Perla Cortés MD   ezetimibe (ZETIA) 10 mg tablet TAKE 1 TABLET(10 MG) BY MOUTH EVERY DAY 10/24/19   Rubén Davis MD   fish oil-omega-3 fatty acids 300-1,000 mg capsule Take 1 capsule by mouth 2 (two) times daily. 1/23/19   Sara Ching MD   insulin degludec-liraglutide (XULTOPHY 100/3.6) 100 unit-3.6 mg /mL (3 mL) InPn Inject 42 Units into the skin once daily. 2/6/19   Sheryl Madison NP   MULTIVIT,THER IRON,CA,FA & MIN (MULTIVITAMIN) Tab Take 1 tablet by mouth every morning.     Historical Provider, MD   pen needle, diabetic 31 gauge x 1/4" Ndle Use as directed 8/11/16   Mike Bass MD   prednisoLONE acetate (PRED FORTE) 1 % DrpS Place 1 drop into the left eye every morning. 10/10/19   Perla Cortés MD   torsemide (DEMADEX) 20 MG Tab TAKE 4 TABLETS BY MOUTH TWICE DAILY 12/1/19   Rubén Davis MD     Anticoagulants/Antiplatelets: aspirin    Allergies:   Review of patient's allergies indicates:   Allergen Reactions    Statins-hmg-coa reductase inhibitors      Generalized Pain    Onglyza [saxagliptin]     Penicillins Rash     Sedation History:  no adverse reactions    Review of Systems:   Hematological: negative, no known coagulopathies  Respiratory: no shortness of breath  Cardiovascular: no chest pain  Gastrointestinal: no abdominal pain  Genito-Urinary: no dysuria  Musculoskeletal: negative  Neurological: no TIA or stroke symptoms         OBJECTIVE:     Vital Signs (Most Recent)       Physical Exam:  ASA: 2  Mallampati: N/A    General: no acute distress  Mental Status: alert and oriented to person, place and time  HEENT: normocephalic, atraumatic  Chest: unlabored breathing  Heart: regular heart rate  Abdomen: nontender but distended/tense  Extremity: moves all extremities    Laboratory  Lab Results   Component Value Date    INR 1.1 05/31/2019       Lab Results   Component Value Date    WBC 5.13 08/22/2019    HGB 9.9 (L) 08/22/2019    HCT 32.0 (L) 08/22/2019    MCV 86 08/22/2019    "  08/22/2019      Lab Results   Component Value Date    GLU 83 08/22/2019     08/22/2019    K 3.6 08/22/2019     08/22/2019    CO2 28 08/22/2019    BUN 39 (H) 08/22/2019    CREATININE 1.1 08/22/2019    CALCIUM 8.8 08/22/2019    MG 2.1 05/31/2019    ALT 12 08/22/2019    AST 14 08/22/2019    ALBUMIN 2.8 (L) 08/22/2019    BILITOT 0.5 08/22/2019       ASSESSMENT/PLAN:     Sedation Plan: local anesthesia  Patient will undergo US guided paracentesis.    Vin Kong MD  Staff Radiologist  Department of Radiology  Pager: [phone]

## 2019-12-16 NOTE — DISCHARGE SUMMARY
Radiology Discharge Summary      Hospital Course: No complications    Admit Date: 12/16/2019  Discharge Date: 12/16/2019     Instructions Given to Patient: Yes  Diet: Resume prior diet  Activity: activity as tolerated    Description of Condition on Discharge: Stable  Vital Signs (Most Recent): BP: 132/66 (12/16/19 1445)    Discharge Disposition: Home    Discharge Diagnosis: CHF, CKD s/p paracentesis     Follow-up: with primary MD Vin Kong MD  Staff Radiologist  Department of Radiology  Pager: [phone]

## 2019-12-16 NOTE — PROCEDURES
Radiology Post-Procedure Note    Pre Op Diagnosis: Ascites, hx of CHF and CKD  Post Op Diagnosis: Same    Procedure: Paracentesis    Procedure performed by: Vin Kong MD    Written Informed Consent Obtained: Yes  Specimen Removed: YES thin, yellow fluid  Estimated Blood Loss: Minimal    Findings:   Successful paracentesis.  Albumin administered PRN per protocol.    Patient tolerated procedure well.    Vin Kong MD  Staff Radiologist  Department of Radiology  Pager: [phone]

## 2019-12-17 ENCOUNTER — HOSPITAL ENCOUNTER (OUTPATIENT)
Dept: WOUND CARE | Facility: HOSPITAL | Age: 59
Discharge: HOME | End: 2019-12-17
Attending: FAMILY MEDICINE
Payer: MEDICAID

## 2019-12-17 DIAGNOSIS — I70.244 ATHEROSCLEROSIS OF NATIVE ARTERY OF LEFT LOWER EXTREMITY WITH ULCERATION OF MIDFOOT: ICD-10-CM

## 2019-12-17 DIAGNOSIS — E11.621 TYPE 2 DIABETES MELLITUS WITH LEFT DIABETIC FOOT ULCER: Primary | ICD-10-CM

## 2019-12-17 DIAGNOSIS — L97.529 TYPE 2 DIABETES MELLITUS WITH LEFT DIABETIC FOOT ULCER: Primary | ICD-10-CM

## 2019-12-17 PROCEDURE — 99215 OFFICE O/P EST HI 40 MIN: CPT | Performed by: FAMILY MEDICINE

## 2019-12-17 PROCEDURE — 99214 PR OFFICE/OUTPT VISIT, EST, LEVL IV, 30-39 MIN: ICD-10-PCS | Mod: ,,, | Performed by: FAMILY MEDICINE

## 2019-12-17 PROCEDURE — 99214 OFFICE O/P EST MOD 30 MIN: CPT | Mod: ,,, | Performed by: FAMILY MEDICINE

## 2019-12-18 PROCEDURE — G0179 PR HOME HEALTH MD RECERTIFICATION: ICD-10-PCS | Mod: ,,, | Performed by: SURGERY

## 2019-12-18 PROCEDURE — G0179 MD RECERTIFICATION HHA PT: HCPCS | Mod: ,,, | Performed by: SURGERY

## 2019-12-23 RX ORDER — TORSEMIDE 20 MG/1
TABLET ORAL
Qty: 120 TABLET | Refills: 0 | Status: SHIPPED | OUTPATIENT
Start: 2019-12-23 | End: 2020-01-09

## 2019-12-24 ENCOUNTER — EXTERNAL HOME HEALTH (OUTPATIENT)
Dept: HOME HEALTH SERVICES | Facility: HOSPITAL | Age: 59
End: 2019-12-24
Payer: MEDICAID

## 2019-12-31 ENCOUNTER — HOSPITAL ENCOUNTER (OUTPATIENT)
Dept: WOUND CARE | Facility: HOSPITAL | Age: 59
Discharge: HOME | End: 2019-12-31
Attending: FAMILY MEDICINE
Payer: MEDICAID

## 2019-12-31 DIAGNOSIS — E11.621 TYPE 2 DIABETES MELLITUS WITH LEFT DIABETIC FOOT ULCER: Primary | ICD-10-CM

## 2019-12-31 DIAGNOSIS — L97.529 TYPE 2 DIABETES MELLITUS WITH LEFT DIABETIC FOOT ULCER: Primary | ICD-10-CM

## 2019-12-31 PROCEDURE — 11042 PR DEBRIDEMENT, SKIN, SUB-Q TISSUE,=<20 SQ CM: ICD-10-PCS | Mod: ,,, | Performed by: FAMILY MEDICINE

## 2019-12-31 PROCEDURE — 11045 DBRDMT SUBQ TISS EACH ADDL: CPT | Mod: ,,, | Performed by: FAMILY MEDICINE

## 2019-12-31 PROCEDURE — 11045 DBRDMT SUBQ TISS EACH ADDL: CPT | Performed by: FAMILY MEDICINE

## 2019-12-31 PROCEDURE — 11042 DBRDMT SUBQ TIS 1ST 20SQCM/<: CPT | Mod: ,,, | Performed by: FAMILY MEDICINE

## 2019-12-31 PROCEDURE — 11045 PR DEB SUBQ TISSUE ADD-ON: ICD-10-PCS | Mod: ,,, | Performed by: FAMILY MEDICINE

## 2019-12-31 PROCEDURE — 99214 PR OFFICE/OUTPT VISIT, EST, LEVL IV, 30-39 MIN: ICD-10-PCS | Mod: 25,,, | Performed by: FAMILY MEDICINE

## 2019-12-31 PROCEDURE — 99214 OFFICE O/P EST MOD 30 MIN: CPT | Mod: 25,,, | Performed by: FAMILY MEDICINE

## 2019-12-31 PROCEDURE — 11042 DBRDMT SUBQ TIS 1ST 20SQCM/<: CPT | Performed by: FAMILY MEDICINE

## 2019-12-31 PROCEDURE — 27201912 HC WOUND CARE DEBRIDEMENT SUPPLIES: Performed by: FAMILY MEDICINE

## 2020-01-08 ENCOUNTER — EXTERNAL HOME HEALTH (OUTPATIENT)
Dept: HOME HEALTH SERVICES | Facility: HOSPITAL | Age: 60
End: 2020-01-08
Payer: MEDICAID

## 2020-01-09 RX ORDER — TORSEMIDE 20 MG/1
TABLET ORAL
Qty: 120 TABLET | Refills: 2 | Status: SHIPPED | OUTPATIENT
Start: 2020-01-09 | End: 2020-01-13 | Stop reason: SDUPTHER

## 2020-01-10 ENCOUNTER — HOSPITAL ENCOUNTER (OUTPATIENT)
Facility: HOSPITAL | Age: 60
Discharge: HOME | End: 2020-01-10
Attending: RADIOLOGY | Admitting: RADIOLOGY
Payer: MEDICAID

## 2020-01-10 VITALS — DIASTOLIC BLOOD PRESSURE: 59 MMHG | SYSTOLIC BLOOD PRESSURE: 111 MMHG

## 2020-01-10 DIAGNOSIS — R18.8 OTHER ASCITES: ICD-10-CM

## 2020-01-10 NOTE — DISCHARGE SUMMARY
Radiology Discharge Summary      Hospital Course: No complications    Admit Date: 1/10/2020  Discharge Date: 01/10/2020     Instructions Given to Patient: Yes    Diet: Resume prior diet     Activity: activity as tolerated    Description of Condition on Discharge: Stable    Vital Signs (Most Recent): BP: (!) 111/59 (01/10/20 1150)    Discharge Disposition: Home    Discharge Diagnosis:   58 y/o M with h/o recurrent painful, tense ascites s/p successful U/S-guided therapeutic LVP with local anesthetic only. Patient tolerated the procedure well. No immediate post-procedural complications noted.      Thank you for considering IR for the care of your patient.      Zach Cha MD  Interventional Radiology

## 2020-01-10 NOTE — PROGRESS NOTES
Paracentesis completed. 4,600 ml removed from right abdomen. Tolerated well. Site dressed with bandaid, CDI, no redness, swelling or hematoma noted. VSS. NADN. Verbalized understanding of discharge instructions. Family present at time of discharge to drive patient home. Pt will call for next appointment.

## 2020-01-10 NOTE — PROCEDURES
Radiology Post-Procedure Note     Pre Op Diagnosis: Recurrent painful, tense ascites  Post Op Diagnosis: Same     Procedure: U/S-guided therapeutic LVP     Procedure performed by: Zach Cha MD     Written Informed Consent Obtained: Yes  Specimen Removed: YES, 4.6-L of straw-colored ascitic fluid  Estimated Blood Loss: none     Findings:   1. Successful therapeutic large-volume paracentesis with local anesthetic only and albumin infusion post PRN as indicated per institutional protocol. Patient tolerated the procedure well. No immediate post-procedural complications noted.       Thank you for considering IR for the care of your patient.      Zach Cha MD  Interventional Radiology

## 2020-01-10 NOTE — H&P
Radiology History & Physical      SUBJECTIVE:     Chief Complaint: Recurrent painful, tense ascites     History of Present Illness:  Marvin Ray is a 58 y.o. male with PMHx of CKD III and combined systolic/diastolic CHF complicated by recurrent abdominal distension and pain 2/2 recurrent painful, tense ascites without TTP on PE requiring frequent  therapeutic LVP.      A new outpatient IR consult placed for US-guided therapeutic paracentesis.       Past Medical History:   Diagnosis Date    Arthritis     Cellulitis     CKD (chronic kidney disease), stage III     Coronary artery disease     Diabetes mellitus     Diabetic retinopathy     Diabetic ulcer of left foot     Glaucoma     Gout     Hyperlipemia     Hypertension     ICD (implantable cardioverter-defibrillator) in place 11/02/2018    Left chest    Non-pressure chronic ulcer of other part of left foot with fat layer exposed 10/23/2018    PVD (peripheral vascular disease)     Type 2 diabetes mellitus with left diabetic foot ulcer 10/29/2018    Unsteady gait     uses a wheelchair     Past Surgical History:   Procedure Laterality Date    AHMED GLAUCOMA IMPLANT Left 2011    DONE AT [facility]    AORTOGRAPHY WITH SERIALOGRAPHY Left 4/30/2019    Procedure: AORTOGRAM, WITH SERIALOGRAPHY, LEFT LOWER EXTREMITY, POSSIBLE INTERVENTION;  Surgeon: Mitch Chan MD;  Location: [facility];  Service: Vascular;  Laterality: Left;  RN PRE OP 4-23-19.  NO H & P, No Cosent  [place]    BAERVELDT GLAUCOMA IMPLANT Left 2012    WITH CATARACT EXTRACTION//DONE AT [facility]    CARDIAC CATHETERIZATION Left 05/2016    CARDIAC DEFIBRILLATOR PLACEMENT Left 11/02/2018    CATARACT EXTRACTION W/  INTRAOCULAR LENS IMPLANT Left 2012    WITH BAERVEDT//DONE AT [facility]    CATARACT EXTRACTION W/  INTRAOCULAR LENS IMPLANT Right 09/26/2018    COMPLEX ()    CB DESTRUCTION WITH CYCLO G6 Left 02/15/2017        CYST REMOVAL      DEBRIDEMENT OF FOOT   12/28/2018    Procedure: DEBRIDEMENT, FOOT;  Surgeon: Mitch Wall MD;  Location: [facility] OR;  Service: Vascular;;    DEBRIDEMENT OF FOOT  6/5/2019    Procedure: DEBRIDEMENT, FOOT;  Surgeon: Mitch Wall MD;  Location: [facility] OR;  Service: Vascular;;    EXAMINATION UNDER ANESTHESIA Left 12/5/2018    Procedure: Exam under anesthesia, left foot debridement, washout and all other indicated procedures;  Surgeon: Mitch Wall MD;  Location: [facility] OR;  Service: Vascular;  Laterality: Left;  1030AM START  RN PREOP 12/3/2018-----AICD  SEEN BY DR JAMES 12/4    EXAMINATION UNDER ANESTHESIA Left 2/13/2019    Procedure: LEFT FOOT WOUND DEBRIDEMENT, WASHOUT AND ALL OTHER INDICATED PROCEDURES;  Surgeon: Mitch Wall MD;  Location: [facility] OR;  Service: Vascular;  Laterality: Left;  requesting 1st case start  THIS CASE 1ST PER DR WALL ON 2/1/19 @ 844AM -[place]  RN PREOP 2/6/2019  HAS CARDIAC CLEARANCE    EXAMINATION UNDER ANESTHESIA Left 12/28/2018    Procedure: Exam under anesthesia, left foot debridement, left foot washout, possible left second through fifth metatarsal resection;  Surgeon: Mitch Wall MD;  Location: [facility] OR;  Service: Vascular;  Laterality: Left;  1:00pm start per julissa @ 8:58am  RN  PHONE PRE OP 12-27-18--=Pt coming from \[facility]\"" on Yefri vickie  NEED CONSENT    EXAMINATION UNDER ANESTHESIA Left 6/5/2019    Procedure: LEFT FOOT DEBRIDEMENT, WASHOUT AND ALL OTHER INDICATED PROCEDURES;  Surgeon: Mitch Wall MD;  Location: [facility] OR;  Service: Vascular;  Laterality: Left;  RN PRE OP 5-31-19------ICD------NEED CONSENT    INCISION AND DRAINAGE Left 11/14/2018    Procedure: Incision and Drainage, left lower extremity debridement, washout;  Surgeon: Mitch Wall MD;  Location: [facility] OR;  Service: Vascular;  Laterality: Left;    INCISION AND DRAINAGE Left 11/16/2018    Procedure: INCISION AND DRAINAGE;  Surgeon: Mitch Wall MD;  Location: [facility] OR;  Service:  Vascular;  Laterality: Left;    INTRAOCULAR PROSTHESES INSERTION Right 9/26/2018    Procedure: INSERTION, IOL PROSTHESIS;  Surgeon: Perla Cortés MD;  Location: [facility] OR [address] [place] [address];  Service: Ophthalmology;  Laterality: Right;    PERITONEOCENTESIS N/A 3/1/2019    Procedure: PARACENTESIS, ABDOMINAL, INITIAL;  Surgeon: Dosc Diagnostic Provider;  [facility];  Service: Radiology;  Laterality: N/A;    PERITONEOCENTESIS N/A 3/25/2019    Procedure: PARACENTESIS, ABDOMINAL, INITIAL;  Surgeon: Dosc Diagnostic Provider;  [facility];  Service: Radiology;  Laterality: N/A;    PERITONEOCENTESIS N/A 7/22/2019    Procedure: PARACENTESIS, ABDOMINAL, INITIAL;  Surgeon: Dosc Diagnostic Provider;  [facility];  Service: Radiology;  Laterality: N/A;    PERITONEOCENTESIS N/A 8/16/2019    Procedure: PARACENTESIS, ABDOMINAL, INITIAL;  Surgeon: Dosc Diagnostic Provider;  Location: [facility] OR;  Service: Radiology;  Laterality: N/A;    PERITONEOCENTESIS N/A 9/26/2019    Procedure: PARACENTESIS, ABDOMINAL;  Surgeon: Dosc Diagnostic Provider;  [facility];  Service: Radiology;  Laterality: N/A;    PERITONEOCENTESIS N/A 10/11/2019    Procedure: PARACENTESIS, ABDOMINAL;  Surgeon: Dosc Diagnostic Provider;  [facility];  Service: Radiology;  Laterality: N/A;    PERITONEOCENTESIS N/A 10/31/2019    Procedure: PARACENTESIS, ABDOMINAL;  Surgeon: Dosc Diagnostic Provider;  [facility];  Service: Radiology;  Laterality: N/A;    PERITONEOCENTESIS N/A 11/18/2019    Procedure: PARACENTESIS, ABDOMINAL;  Surgeon: Dosc Diagnostic Provider;  [facility];  Service: Radiology;  Laterality: N/A;    PERITONEOCENTESIS N/A 12/16/2019    Procedure: PARACENTESIS, ABDOMINAL;  Surgeon: Dosc Diagnostic Provider;  [facility];  Service: Radiology;  Laterality: N/A;  2PM START    PHACOEMULSIFICATION OF CATARACT Right 9/26/2018    Procedure: PHACOEMULSIFICATION, CATARACT;  Surgeon: Perla Cortés MD;   Location: [facility] OR 1ST FLR;  Service: Ophthalmology;  Laterality: Right;    REPEAT ABDOMINAL PARACENTESIS N/A 2/11/2019    Procedure: PARACENTESIS, ABDOMINAL, REPEAT;  Surgeon: [facility] Diagnostic Provider;  Location: [facility] OR;  Service: Radiology;  Laterality: N/A;  1PM START    REPEAT ABDOMINAL PARACENTESIS N/A 9/12/2019    Procedure: PARACENTESIS, ABDOMINAL, REPEAT;  Surgeon: [facility] Diagnostic Provider;  Location: [facility] OR;  Service: Radiology;  Laterality: N/A;  9AM START    REPEAT ABDOMINAL PARACENTESIS N/A 12/2/2019    Procedure: PARACENTESIS, ABDOMINAL, REPEAT;  Surgeon: [facility] Diagnostic Provider;  Location: [facility] OR;  Service: Radiology;  Laterality: N/A;  3PM START    REVISION OF PROCEDURE INVOLVING GLAUCOMA DRAINAGE DEVICE Left 10/2/2019    EXTRUDING BAERVELDT /WITH PERICARDIAL PATCH GRAFT ()    Right foot surgery  10/2014    TOE AMPUTATION Right     first and second    TOE AMPUTATION Left 11/16/2018    Procedure: AMPUTATION, TOES  2-5;  Surgeon: Mitch Chan MD;  Location: [facility] OR;  Service: Vascular;  Laterality: Left;    TOE AMPUTATION Left 12/5/2018    Procedure: AMPUTATION, TOE;  Surgeon: Mitch Chan MD;  Location: [facility] OR;  Service: Vascular;  Laterality: Left;  left great toe    TONSILLECTOMY         Home Meds:   Prior to Admission medications    Medication Sig Start Date End Date Taking? Authorizing Provider   allopurinol (ZYLOPRIM) 300 MG tablet Take 1 tablet (300 mg total) by mouth once daily. 9/27/19   Rubén Davis MD   aspirin (ECOTRIN) 81 MG EC tablet Take 81 mg by mouth once daily.    Historical Provider, MD   aspirin 325 MG tablet Take 325 mg by mouth once daily.     Historical Provider, MD   atropine 1% (ISOPTO ATROPINE) 1 % Drop Place 1 drop into the left eye once daily.    Perla Cortés MD   bisacodyl (DULCOLAX) 5 mg EC tablet Take 5 mg by mouth daily as needed for Constipation.    Historical Provider, MD   brimonidine 0.2% (ALPHAGAN) 0.2 % Drop Place  "1 drop into both eyes 3 (three) times daily. 12/12/19   Perla Cortés MD   carvedilol (COREG) 3.125 MG tablet Take 1 tablet (3.125 mg total) by mouth 2 (two) times daily. 1/23/19 1/23/20  Sara Ching MD   coenzyme Q10 100 mg capsule Take 100 mg by mouth every morning.    Historical Provider, MD   dorzolamide-timolol 2-0.5% (COSOPT) 22.3-6.8 mg/mL ophthalmic solution Place 1 drop into both eyes 3 (three) times daily. 12/12/19   Perla Cortés MD   ezetimibe (ZETIA) 10 mg tablet TAKE 1 TABLET(10 MG) BY MOUTH EVERY DAY 10/24/19   Rubén Davis MD   fish oil-omega-3 fatty acids 300-1,000 mg capsule Take 1 capsule by mouth 2 (two) times daily. 1/23/19   Sara Ching MD   insulin degludec-liraglutide (XULTOPHY 100/3.6) 100 unit-3.6 mg /mL (3 mL) InPn Inject 42 Units into the skin once daily. 2/6/19   Sheryl Madison NP   MULTIVIT,THER IRON,CA,FA & MIN (MULTIVITAMIN) Tab Take 1 tablet by mouth every morning.     Historical Provider, MD   pen needle, diabetic 31 gauge x 1/4" Ndle Use as directed 8/11/16   Mike Bass MD   prednisoLONE acetate (PRED FORTE) 1 % DrpS Place 1 drop into the left eye every morning. 10/10/19   Perla Cortés MD   torsemide (DEMADEX) 20 MG Tab TAKE 4 TABLETS BY MOUTH TWICE DAILY 1/9/20   Rubén Davis MD     Anticoagulants/Antiplatelets: no anticoagulation    Allergies:   Review of patient's allergies indicates:   Allergen Reactions    Statins-hmg-coa reductase inhibitors      Generalized Pain    Onglyza [saxagliptin]     Penicillins Rash     Sedation History:  no adverse reactions     Review of Systems:   Hematological: no known coagulopathies  Respiratory: no cough, shortness of breath, or wheezing  Cardiovascular: no chest pain or dyspnea on exertion  Gastrointestinal: no abdominal pain, change in bowel habits, or black or bloody stools  Genito-Urinary: no dysuria, trouble voiding, or hematuria  Musculoskeletal: negative  Neurological: no TIA or stroke " symptoms        OBJECTIVE:     Vital Signs (Most Recent)     Physical Exam:  ASA: III  Mallampati: III     General: no acute distress  Mental Status: alert and oriented to person, place and time  HEENT: normocephalic, atraumatic  Chest: mild labored breathing  Heart: regular heart rate  Abdomen: +distended. No TTP.  Extremity: moves all extremities    Laboratory  Lab Results   Component Value Date    INR 1.1 05/31/2019       Lab Results   Component Value Date    WBC 5.13 08/22/2019    HGB 9.9 (L) 08/22/2019    HCT 32.0 (L) 08/22/2019    MCV 86 08/22/2019     08/22/2019      Lab Results   Component Value Date    GLU 83 08/22/2019     08/22/2019    K 3.6 08/22/2019     08/22/2019    CO2 28 08/22/2019    BUN 39 (H) 08/22/2019    CREATININE 1.1 08/22/2019    CALCIUM 8.8 08/22/2019    MG 2.1 05/31/2019    ALT 12 08/22/2019    AST 14 08/22/2019    ALBUMIN 2.8 (L) 08/22/2019    BILITOT 0.5 08/22/2019       ASSESSMENT/PLAN:     59 y/o M with CKD III and combined systolic/diastolic CHF complicated by recurrent abdominal distension and pain 2/2 recurrent painful, tense ascites requiring frequent large-volume therapeutic paracentesis.      1. Will attempt U/S-guided therapeutic paracentesis with local anesthetic only and albumin infusion post PRN per institutional protocol.      Risks (including, but not limited to, pain, bleeding, infection, damage to nearby structures, failure to obtain sufficient material for a diagnosis, the need for additional procedures, and death), benefits, and alternatives were discussed with the patient. All questions were answered to the best of my abilities. The patient wishes to proceed with the procedure. Written informed consent was obtained.     Thank you for considering IR for the care of your patient.      Zach Cha MD  Interventional Radiology

## 2020-01-13 RX ORDER — TORSEMIDE 20 MG/1
80 TABLET ORAL 2 TIMES DAILY
Qty: 120 TABLET | Refills: 2 | Status: SHIPPED | OUTPATIENT
Start: 2020-01-13 | End: 2020-01-28 | Stop reason: SDUPTHER

## 2020-01-13 NOTE — TELEPHONE ENCOUNTER
----- Message from Delmis Chung sent at 1/13/2020 12:01 PM CST -----  Contact: pt's sister rhina [phone]  Type: RX Refill Request    Who Called: pt's sister rhina [phone]    Have you contacted your pharmacy:    Refill or New Rx:torsemide (DEMADEX) 20 MG Tab - needs pa    RX Name and Strength    How is the patient currently taking it? (ex. 1XDay):    Is this a 30 day or 90 day RX:    Preferred Pharmacy with phone number:    [id number]  RADHA MERCEDES - [address]  [address]  [place] [address]  Phone: [phone] Fax: [phone]        Local or Mail Order:    Ordering Provider:    Would the patient rather a call back or a response via [facility]? [phone] or [phone]    Best Call Back Number:    Additional Information:

## 2020-01-14 ENCOUNTER — HOSPITAL ENCOUNTER (OUTPATIENT)
Dept: WOUND CARE | Facility: HOSPITAL | Age: 60
Discharge: HOME | End: 2020-01-14
Attending: FAMILY MEDICINE
Payer: MEDICAID

## 2020-01-14 DIAGNOSIS — L97.529 TYPE 2 DIABETES MELLITUS WITH LEFT DIABETIC FOOT ULCER: Primary | ICD-10-CM

## 2020-01-14 DIAGNOSIS — E11.621 TYPE 2 DIABETES MELLITUS WITH LEFT DIABETIC FOOT ULCER: Primary | ICD-10-CM

## 2020-01-14 PROCEDURE — 11042 PR DEBRIDEMENT, SKIN, SUB-Q TISSUE,=<20 SQ CM: ICD-10-PCS | Mod: ,,, | Performed by: FAMILY MEDICINE

## 2020-01-14 PROCEDURE — 11045 DBRDMT SUBQ TISS EACH ADDL: CPT | Mod: ,,, | Performed by: FAMILY MEDICINE

## 2020-01-14 PROCEDURE — 11042 DBRDMT SUBQ TIS 1ST 20SQCM/<: CPT | Mod: ,,, | Performed by: FAMILY MEDICINE

## 2020-01-14 PROCEDURE — 99214 PR OFFICE/OUTPT VISIT, EST, LEVL IV, 30-39 MIN: ICD-10-PCS | Mod: 25,,, | Performed by: FAMILY MEDICINE

## 2020-01-14 PROCEDURE — 27201912 HC WOUND CARE DEBRIDEMENT SUPPLIES: Performed by: FAMILY MEDICINE

## 2020-01-14 PROCEDURE — 11045 DBRDMT SUBQ TISS EACH ADDL: CPT | Performed by: FAMILY MEDICINE

## 2020-01-14 PROCEDURE — 11042 DBRDMT SUBQ TIS 1ST 20SQCM/<: CPT | Performed by: FAMILY MEDICINE

## 2020-01-14 PROCEDURE — 99214 OFFICE O/P EST MOD 30 MIN: CPT | Mod: 25,,, | Performed by: FAMILY MEDICINE

## 2020-01-14 PROCEDURE — 11045 PR DEB SUBQ TISSUE ADD-ON: ICD-10-PCS | Mod: ,,, | Performed by: FAMILY MEDICINE

## 2020-01-15 ENCOUNTER — PATIENT OUTREACH (OUTPATIENT)
Dept: ADMINISTRATIVE | Facility: OTHER | Age: 60
End: 2020-01-15

## 2020-01-20 ENCOUNTER — HOSPITAL ENCOUNTER (OUTPATIENT)
Facility: HOSPITAL | Age: 60
Discharge: HOME | End: 2020-01-20
Attending: RADIOLOGY | Admitting: RADIOLOGY
Payer: MEDICAID

## 2020-01-20 ENCOUNTER — HOSPITAL ENCOUNTER (OUTPATIENT)
Dept: INTERVENTIONAL RADIOLOGY/VASCULAR | Facility: HOSPITAL | Age: 60
Discharge: HOME | End: 2020-01-20
Attending: FAMILY MEDICINE
Payer: MEDICAID

## 2020-01-20 ENCOUNTER — OFFICE VISIT (OUTPATIENT)
Dept: CARDIOLOGY | Facility: CLINIC | Age: 60
End: 2020-01-20
Payer: MEDICAID

## 2020-01-20 VITALS
RESPIRATION RATE: 20 BRPM | RESPIRATION RATE: 15 BRPM | TEMPERATURE: 98 F | HEART RATE: 85 BPM | HEART RATE: 80 BPM | SYSTOLIC BLOOD PRESSURE: 109 MMHG | DIASTOLIC BLOOD PRESSURE: 65 MMHG | DIASTOLIC BLOOD PRESSURE: 82 MMHG | BODY MASS INDEX: 27.06 KG/M2 | OXYGEN SATURATION: 96 % | SYSTOLIC BLOOD PRESSURE: 119 MMHG | WEIGHT: 189 LBS | OXYGEN SATURATION: 99 % | HEIGHT: 70 IN

## 2020-01-20 VITALS — SYSTOLIC BLOOD PRESSURE: 102 MMHG | DIASTOLIC BLOOD PRESSURE: 79 MMHG

## 2020-01-20 DIAGNOSIS — I25.5 ISCHEMIC CARDIOMYOPATHY: ICD-10-CM

## 2020-01-20 DIAGNOSIS — E11.621 TYPE 2 DIABETES MELLITUS WITH LEFT DIABETIC FOOT ULCER: ICD-10-CM

## 2020-01-20 DIAGNOSIS — I10 ESSENTIAL HYPERTENSION: Chronic | ICD-10-CM

## 2020-01-20 DIAGNOSIS — N18.30 STAGE 3 CHRONIC KIDNEY DISEASE: ICD-10-CM

## 2020-01-20 DIAGNOSIS — R18.8 ASCITES: ICD-10-CM

## 2020-01-20 DIAGNOSIS — I25.10 CORONARY ARTERY DISEASE INVOLVING NATIVE CORONARY ARTERY OF NATIVE HEART WITHOUT ANGINA PECTORIS: Primary | ICD-10-CM

## 2020-01-20 DIAGNOSIS — R18.8 OTHER ASCITES: ICD-10-CM

## 2020-01-20 DIAGNOSIS — I73.9 PVD (PERIPHERAL VASCULAR DISEASE): Chronic | ICD-10-CM

## 2020-01-20 DIAGNOSIS — E78.2 MIXED HYPERLIPIDEMIA: Chronic | ICD-10-CM

## 2020-01-20 DIAGNOSIS — L97.529 TYPE 2 DIABETES MELLITUS WITH LEFT DIABETIC FOOT ULCER: ICD-10-CM

## 2020-01-20 PROCEDURE — 93282 PRGRMG EVAL IMPLANTABLE DFB: CPT | Mod: 26,S$PBB,, | Performed by: INTERNAL MEDICINE

## 2020-01-20 PROCEDURE — 63600175 PHARM REV CODE 636 W HCPCS: Mod: JG | Performed by: RADIOLOGY

## 2020-01-20 PROCEDURE — 49083 IR PARACENTESIS WITH IMAGING: ICD-10-PCS | Mod: ,,, | Performed by: RADIOLOGY

## 2020-01-20 PROCEDURE — 99999 PR PBB SHADOW E&M-EST. PATIENT-LVL III: CPT | Mod: PBBFAC,,, | Performed by: INTERNAL MEDICINE

## 2020-01-20 PROCEDURE — A7048 VACUUM DRAIN BOTTLE/TUBE KIT: HCPCS

## 2020-01-20 PROCEDURE — 93282 PR PROGRAM EVAL (IN PERSON) IMPLANT DEVICE,CARDVERT/DEFIB,1 LEAD: ICD-10-PCS | Mod: 26,S$PBB,, | Performed by: INTERNAL MEDICINE

## 2020-01-20 PROCEDURE — 99214 OFFICE O/P EST MOD 30 MIN: CPT | Mod: S$PBB,25,, | Performed by: INTERNAL MEDICINE

## 2020-01-20 PROCEDURE — 99213 OFFICE O/P EST LOW 20 MIN: CPT | Mod: PBBFAC,25 | Performed by: INTERNAL MEDICINE

## 2020-01-20 PROCEDURE — 49083 ABD PARACENTESIS W/IMAGING: CPT

## 2020-01-20 PROCEDURE — P9047 ALBUMIN (HUMAN), 25%, 50ML: HCPCS | Mod: JG | Performed by: RADIOLOGY

## 2020-01-20 PROCEDURE — 99214 PR OFFICE/OUTPT VISIT, EST, LEVL IV, 30-39 MIN: ICD-10-PCS | Mod: S$PBB,25,, | Performed by: INTERNAL MEDICINE

## 2020-01-20 PROCEDURE — 99999 PR PBB SHADOW E&M-EST. PATIENT-LVL III: ICD-10-PCS | Mod: PBBFAC,,, | Performed by: INTERNAL MEDICINE

## 2020-01-20 PROCEDURE — 93282 PRGRMG EVAL IMPLANTABLE DFB: CPT | Mod: PBBFAC | Performed by: INTERNAL MEDICINE

## 2020-01-20 PROCEDURE — 49083 ABD PARACENTESIS W/IMAGING: CPT | Mod: ,,, | Performed by: RADIOLOGY

## 2020-01-20 RX ORDER — ALBUMIN HUMAN 250 G/1000ML
37.5 SOLUTION INTRAVENOUS ONCE AS NEEDED
Status: COMPLETED | OUTPATIENT
Start: 2020-01-20 | End: 2020-01-20

## 2020-01-20 RX ORDER — HYDROCODONE BITARTRATE AND ACETAMINOPHEN 5; 325 MG/1; MG/1
1 TABLET ORAL EVERY 4 HOURS PRN
Status: CANCELLED | OUTPATIENT
Start: 2020-01-20

## 2020-01-20 RX ADMIN — ALBUMIN HUMAN 37.5 G: 250 SOLUTION INTRAVENOUS at 01:01

## 2020-01-20 NOTE — H&P
[facility]  History & Physical    Subjective:      Chief Complaint/Reason for Admission: refractory ascities    Marvin Ray is a 59 y.o. male with refractory ascities for large volume paracentesis.    Past Medical History:   Diagnosis Date    Arthritis     Cellulitis     CKD (chronic kidney disease), stage III     Coronary artery disease     Diabetes mellitus     Diabetic retinopathy     Diabetic ulcer of left foot     Glaucoma     Gout     Hyperlipemia     Hypertension     ICD (implantable cardioverter-defibrillator) in place 11/02/2018    Left chest    Non-pressure chronic ulcer of other part of left foot with fat layer exposed 10/23/2018    PVD (peripheral vascular disease)     Type 2 diabetes mellitus with left diabetic foot ulcer 10/29/2018    Unsteady gait     uses a wheelchair     Past Surgical History:   Procedure Laterality Date    AHMED GLAUCOMA IMPLANT Left 2011    DONE AT [facility]    AORTOGRAPHY WITH SERIALOGRAPHY Left 4/30/2019    Procedure: AORTOGRAM, WITH SERIALOGRAPHY, LEFT LOWER EXTREMITY, POSSIBLE INTERVENTION;  Surgeon: Mitch Chan MD;  Location: [facility] OR;  Service: Vascular;  Laterality: Left;  RN PRE OP 4-23-19.  NO H & P, No Cosent  [place]    BAERVELDT GLAUCOMA IMPLANT Left 2012    WITH CATARACT EXTRACTION//DONE AT [facility]    CARDIAC CATHETERIZATION Left 05/2016    CARDIAC DEFIBRILLATOR PLACEMENT Left 11/02/2018    CATARACT EXTRACTION W/  INTRAOCULAR LENS IMPLANT Left 2012    WITH BAERVEDT//DONE AT [facility]    CATARACT EXTRACTION W/  INTRAOCULAR LENS IMPLANT Right 09/26/2018    COMPLEX ()    CB DESTRUCTION WITH CYCLO G6 Left 02/15/2017        CYST REMOVAL      DEBRIDEMENT OF FOOT  12/28/2018    Procedure: DEBRIDEMENT, FOOT;  Surgeon: Mitch Chan MD;  Location: [facility] OR;  Service: Vascular;;    DEBRIDEMENT OF FOOT  6/5/2019    Procedure: DEBRIDEMENT, FOOT;  Surgeon: Mitch Chan MD;  Location: [facility] OR;   Service: Vascular;;    EXAMINATION UNDER ANESTHESIA Left 12/5/2018    Procedure: Exam under anesthesia, left foot debridement, washout and all other indicated procedures;  Surgeon: Mitch Wall MD;  Location: [facility] OR;  Service: Vascular;  Laterality: Left;  1030AM START  RN PREOP 12/3/2018-----AICD  SEEN BY DR JAMES 12/4    EXAMINATION UNDER ANESTHESIA Left 2/13/2019    Procedure: LEFT FOOT WOUND DEBRIDEMENT, WASHOUT AND ALL OTHER INDICATED PROCEDURES;  Surgeon: Mitch Wall MD;  Location: [facility] OR;  Service: Vascular;  Laterality: Left;  requesting 1st case start  THIS CASE 1ST PER DR WALL ON 2/1/19 @ 844AM -[place]  RN PREOP 2/6/2019  HAS CARDIAC CLEARANCE    EXAMINATION UNDER ANESTHESIA Left 12/28/2018    Procedure: Exam under anesthesia, left foot debridement, left foot washout, possible left second through fifth metatarsal resection;  Surgeon: Mitch Wall MD;  Location: [facility] OR;  Service: Vascular;  Laterality: Left;  1:00pm start per julissa @ 8:58am  RN  PHONE PRE OP 12-27-18--=Pt coming from [facility] on [address]  NEED CONSENT    EXAMINATION UNDER ANESTHESIA Left 6/5/2019    Procedure: LEFT FOOT DEBRIDEMENT, WASHOUT AND ALL OTHER INDICATED PROCEDURES;  Surgeon: Mitch Wall MD;  Location: [facility] OR;  Service: Vascular;  Laterality: Left;  RN PRE OP 5-31-19------ICD------NEED CONSENT    INCISION AND DRAINAGE Left 11/14/2018    Procedure: Incision and Drainage, left lower extremity debridement, washout;  Surgeon: Mitch Wall MD;  Location: [facility] OR;  Service: Vascular;  Laterality: Left;    INCISION AND DRAINAGE Left 11/16/2018    Procedure: INCISION AND DRAINAGE;  Surgeon: Mitch Wall MD;  Location: [facility] OR;  Service: Vascular;  Laterality: Left;    INTRAOCULAR PROSTHESES INSERTION Right 9/26/2018    Procedure: INSERTION, IOL PROSTHESIS;  Surgeon: Perla Cortés MD;  Location: [facility] OR [address] [place] [address];  Service: Ophthalmology;  Laterality: Right;     PERITONEOCENTESIS N/A 3/1/2019    Procedure: PARACENTESIS, ABDOMINAL, INITIAL;  Surgeon: Dosc Diagnostic Provider;  Location: [facility] OR;  Service: Radiology;  Laterality: N/A;    PERITONEOCENTESIS N/A 3/25/2019    Procedure: PARACENTESIS, ABDOMINAL, INITIAL;  Surgeon: Dosc Diagnostic Provider;  [facility];  Service: Radiology;  Laterality: N/A;    PERITONEOCENTESIS N/A 7/22/2019    Procedure: PARACENTESIS, ABDOMINAL, INITIAL;  Surgeon: Dosc Diagnostic Provider;  [facility];  Service: Radiology;  Laterality: N/A;    PERITONEOCENTESIS N/A 8/16/2019    Procedure: PARACENTESIS, ABDOMINAL, INITIAL;  Surgeon: Dosc Diagnostic Provider;  [facility];  Service: Radiology;  Laterality: N/A;    PERITONEOCENTESIS N/A 9/26/2019    Procedure: PARACENTESIS, ABDOMINAL;  Surgeon: Dosc Diagnostic Provider;  Location: [facility] OR;  Service: Radiology;  Laterality: N/A;    PERITONEOCENTESIS N/A 10/11/2019    Procedure: PARACENTESIS, ABDOMINAL;  Surgeon: Dosc Diagnostic Provider;  Location: [facility] OR;  Service: Radiology;  Laterality: N/A;    PERITONEOCENTESIS N/A 10/31/2019    Procedure: PARACENTESIS, ABDOMINAL;  Surgeon: Dosc Diagnostic Provider;  Location: [facility] OR;  Service: Radiology;  Laterality: N/A;    PERITONEOCENTESIS N/A 11/18/2019    Procedure: PARACENTESIS, ABDOMINAL;  Surgeon: Dosc Diagnostic Provider;  Location: [facility] OR;  Service: Radiology;  Laterality: N/A;    PERITONEOCENTESIS N/A 12/16/2019    Procedure: PARACENTESIS, ABDOMINAL;  Surgeon: Dosc Diagnostic Provider;  Location: [facility] OR;  Service: Radiology;  Laterality: N/A;  2PM START    PHACOEMULSIFICATION OF CATARACT Right 9/26/2018    Procedure: PHACOEMULSIFICATION, CATARACT;  Surgeon: Perla Cortés MD;  Location: [facility] OR [address] [place] [address];  Service: Ophthalmology;  Laterality: Right;    REPEAT ABDOMINAL PARACENTESIS N/A 2/11/2019    Procedure: PARACENTESIS, ABDOMINAL, REPEAT;  Surgeon: Dosc Diagnostic Provider;  Location: [facility] OR;  Service:  Radiology;  Laterality: N/A;  1PM START    REPEAT ABDOMINAL PARACENTESIS N/A 9/12/2019    Procedure: PARACENTESIS, ABDOMINAL, REPEAT;  Surgeon: [facility] Diagnostic Provider;  Location: [facility] OR;  Service: Radiology;  Laterality: N/A;  9AM START    REPEAT ABDOMINAL PARACENTESIS N/A 12/2/2019    Procedure: PARACENTESIS, ABDOMINAL, REPEAT;  Surgeon: [facility] Diagnostic Provider;  Location: [facility] OR;  Service: Radiology;  Laterality: N/A;  3PM START    REPEAT ABDOMINAL PARACENTESIS N/A 1/10/2020    Procedure: PARACENTESIS, ABDOMINAL, REPEAT;  Surgeon: [facility] Diagnostic Provider;  Location: [facility] OR;  Service: Radiology;  Laterality: N/A;  1130AM START    REVISION OF PROCEDURE INVOLVING GLAUCOMA DRAINAGE DEVICE Left 10/2/2019    EXTRUDING BAERVELDT /WITH PERICARDIAL PATCH GRAFT ()    Right foot surgery  10/2014    TOE AMPUTATION Right     first and second    TOE AMPUTATION Left 11/16/2018    Procedure: AMPUTATION, TOES  2-5;  Surgeon: Mitch Chan MD;  Location: [facility] OR;  Service: Vascular;  Laterality: Left;    TOE AMPUTATION Left 12/5/2018    Procedure: AMPUTATION, TOE;  Surgeon: Mitch Chan MD;  Location: [facility] OR;  Service: Vascular;  Laterality: Left;  left great toe    TONSILLECTOMY       Family History   Problem Relation Age of Onset    Diabetes Mother     Heart disease Brother     No Known Problems Father     No Known Problems Sister     No Known Problems Maternal Aunt     No Known Problems Maternal Uncle     No Known Problems Paternal Aunt     No Known Problems Paternal Uncle     No Known Problems Maternal Grandmother     No Known Problems Maternal Grandfather     No Known Problems Paternal Grandmother     No Known Problems Paternal Grandfather     Anemia Neg Hx     Arrhythmia Neg Hx     Asthma Neg Hx     Clotting disorder Neg Hx     Fainting Neg Hx     Heart attack Neg Hx     Heart failure Neg Hx     Hyperlipidemia Neg Hx     Hypertension Neg Hx     Stroke Neg Hx      Atrial Septal Defect Neg Hx     Amblyopia Neg Hx     Blindness Neg Hx     Glaucoma Neg Hx     Macular degeneration Neg Hx     Retinal detachment Neg Hx     Strabismus Neg Hx      Social History     Tobacco Use    Smoking status: Former Smoker     Packs/day: 1.00     Years: 3.00     Pack years: 3.00     Types: Cigarettes     Last attempt to quit: 1984     Years since quittin.5    Smokeless tobacco: Never Used   Substance Use Topics    Alcohol use: Not Currently     Alcohol/week: 1.0 standard drinks     Types: 1 Cans of beer per week     Comment: rarely    Drug use: No         (Not in a hospital admission)  Review of patient's allergies indicates:   Allergen Reactions    Statins-hmg-coa reductase inhibitors      Generalized Pain    Onglyza [saxagliptin]     Penicillins Rash        ROS: abdominal fullness.    Objective:      Vital Signs (Most Recent)  BP: 123/68 (20 1210)    Vital Signs Range (Last 24H):  BP: (123-139)/(58-81)     Physical Exam: ascities    Data Review:    CBC:   Lab Results   Component Value Date    WBC 5.13 2019    RBC 3.73 (L) 2019    HGB 9.9 (L) 2019    HCT 32.0 (L) 2019     2019     BMP:   Lab Results   Component Value Date    GLU 83 2019     2019    K 3.6 2019     2019    CO2 28 2019    BUN 39 (H) 2019    CREATININE 1.1 2019    CALCIUM 8.8 2019     Coagulation:   Lab Results   Component Value Date    INR 1.1 2019    APTT 28.3 2019          Assessment:      Refractory ascities    Plan:    Large volume paracentesis

## 2020-01-20 NOTE — DISCHARGE INSTRUCTIONS
BATHING:  ? You may shower tomorrow.  DRESSING:  ? Remove dressing tomorrow.        ACTIVITY LEVEL: If you have received sedation or an anesthetic, you may feel sleepy for several hours. Rest until you are more awake. Gradually resume your normal activities    Do not drive, drink alcohol, or sign legal documents for 24 hours, or if taking narcotic pain medication.      DIET: You may resume your home diet. If nausea is present, increase your diet gradually with fluids and bland foods.    Medications: Pain medication should be taken only if needed and as directed. If antibiotics are prescribed, the medication should be taken until completed. You will be given an updated list of you medications.  ? No driving, alcoholic beverages or signing legal documents for next 24 hours if you have had sedation, or while taking pain medication    CALL THE DOCTOR:   For any obvious bleeding (some dried blood over the incision is normal).     Redness, swelling, foul smell around incision or fever over 101.  Shortness of breath.  Persistent pain or nausea not relieved by medication.  Call  [phone]     to speak with an Interventional Radiologist    If any unusual problems or difficulties occur contact your doctor. If you cannot contact your doctor but feel your signs and symptoms warrant a physicians attention return to the emergency room.  Fall Prevention  Millions of people fall every year and injure themselves. You may have had anesthesia or sedation which may increase your risk of falling. You may have health issues that put you at an increased risk of falling.     Here are ways to reduce your risk of falling.  ·   · Make your home safe by keeping walkways clear of objects you may trip over.  · Use non-slip pads under rugs. Do not use area rugs or small throw rugs.  · Use non-slip mats in bathtubs and showers.  · Install handrails and lights on staircases.  · Do not walk in poorly lit areas.  · Do not stand on chairs or wobbly  ladders.  · Use caution when reaching overhead or looking upward. This position can cause a loss of balance.  · Be sure your shoes fit properly, have non-slip bottoms and are in good condition.   · Wear shoes both inside and out. Avoid going barefoot or wearing slippers.  · Be cautious when going up and down stairs, curbs, and when walking on uneven sidewalks.  · If your balance is poor, consider using a cane or walker.  · If your fall was related to alcohol use, stop or limit alcohol intake.   · If your fall was related to use of sleeping medicines, talk to your doctor about this. You may need to reduce your dosage at bedtime if you awaken during the night to go to the bathroom.    · To reduce the need for nighttime bathroom trips:  ¨ Avoid drinking fluids for several hours before going to bed  ¨ Empty your bladder before going to bed  ¨ Men can keep a urinal at the bedside  · Stay as active as you can. Balance, flexibility, strength, and endurance all come from exercise. They all play a role in preventing falls. Ask your healthcare provider which types of activity are right for you.  · Get your vision checked on a regular basis.  · If you have pets, know where they are before you stand up or walk so you don't trip over them.  · Use night lights.

## 2020-01-20 NOTE — PROCEDURES
Marvin Ray is a 59 y.o. male patient.    BP: 102/79 (01/20/20 1225)       Procedures     [facility]  Interventional Radiology    Procedure Performed by: Veronica    Procedure: paracentesis    Pre-Op Diagnosis: refractory ascities    Post-Op Diagnosis/Findings: same    Estimated Blood Loss: 0    Specimen/Tissue Removed: Yes - 5.9 L ascities    Date: 01/20/2020 Time: 12:47 PM    Maciej Vogel  1/20/2020

## 2020-01-20 NOTE — PROGRESS NOTES
Report called to JILLIAN Corea in [facility]. Paracentesis completed. 5.9L removed from right lateral abdomen. Site dressed with bandaid, CDI, no redness, swelling or hematoma noted. Tolerated well. EDDI LANGSTON. Transported to [facility] per RN.

## 2020-01-20 NOTE — PROGRESS NOTES
CARDIOVASCULAR PROGRESS NOTE    REASON FOR CONSULT:   Marvin Ray is a 59 y.o. male who presents for follow up of CAD/ICM.    PCP: Susan Damonc: Dustin  Optho: [facility]  HISTORY OF PRESENT ILLNESS:   The patient returns for follow-up.  He is accompanied by his wife.  He underwent large volume paracentesis this morning, and his current medications are not listed below, but apparently have not changed versus his last office visit.  The patient denies intercurrent angina, dyspnea, palpitations, lightheadedness, dizziness, syncope, or defibrillator discharges.  There has been no PND, orthopnea, or lower extremity edema.  He denies melena, hematuria, or claudicant symptoms.  His left heel wound apparently has healed.  He does continue to follow-up with Dr. Davis for this.    The [employer] ICD was interrogated in the office today.  Current rhythm is V sensed at 81 beats per minute.  He is pacing less than 1% of time the ventricle.  Estimated longevity is 4.5 years.  Pacing and sensing thresholds as well as impedance are normal.  There been no mode switch episodes or high ventricular rate episodes.  No programming changes were made.    CARDIOVASCULAR HISTORY:   [employer] ICD  CAD/ICM, nonoperable per [facility] note 5/2016  PAD  CVI    PAST MEDICAL HISTORY:     Past Medical History:   Diagnosis Date    Arthritis     Cellulitis     CKD (chronic kidney disease), stage III     Coronary artery disease     Diabetes mellitus     Diabetic retinopathy     Diabetic ulcer of left foot     Glaucoma     Gout     Hyperlipemia     Hypertension     ICD (implantable cardioverter-defibrillator) in place 11/02/2018    Left chest    Non-pressure chronic ulcer of other part of left foot with fat layer exposed 10/23/2018    PVD (peripheral vascular disease)     Type 2 diabetes mellitus with left diabetic foot ulcer 10/29/2018    Unsteady gait     uses a wheelchair       PAST SURGICAL HISTORY:     Past Surgical History:    Procedure Laterality Date    AHMED GLAUCOMA IMPLANT Left 2011    DONE AT [facility]    AORTOGRAPHY WITH SERIALOGRAPHY Left 4/30/2019    Procedure: AORTOGRAM, WITH SERIALOGRAPHY, LEFT LOWER EXTREMITY, POSSIBLE INTERVENTION;  Surgeon: Mitch Wall MD;  Location: [facility] OR;  Service: Vascular;  Laterality: Left;  RN PRE OP 4-23-19.  NO H & P, No Cosent  [place]    BAERVELDT GLAUCOMA IMPLANT Left 2012    WITH CATARACT EXTRACTION//DONE AT [facility]    CARDIAC CATHETERIZATION Left 05/2016    CARDIAC DEFIBRILLATOR PLACEMENT Left 11/02/2018    CATARACT EXTRACTION W/  INTRAOCULAR LENS IMPLANT Left 2012    WITH BAERVEDT//DONE AT [facility]    CATARACT EXTRACTION W/  INTRAOCULAR LENS IMPLANT Right 09/26/2018    COMPLEX ()    CB DESTRUCTION WITH CYCLO G6 Left 02/15/2017        CYST REMOVAL      DEBRIDEMENT OF FOOT  12/28/2018    Procedure: DEBRIDEMENT, FOOT;  Surgeon: Mitch Wall MD;  Location: [facility] OR;  Service: Vascular;;    DEBRIDEMENT OF FOOT  6/5/2019    Procedure: DEBRIDEMENT, FOOT;  Surgeon: Mitch Wall MD;  Location: [facility] OR;  Service: Vascular;;    EXAMINATION UNDER ANESTHESIA Left 12/5/2018    Procedure: Exam under anesthesia, left foot debridement, washout and all other indicated procedures;  Surgeon: Mitch Wall MD;  Location: [facility] OR;  Service: Vascular;  Laterality: Left;  1030AM START  RN PREOP 12/3/2018-----AICD  SEEN BY DR JAMES 12/4    EXAMINATION UNDER ANESTHESIA Left 2/13/2019    Procedure: LEFT FOOT WOUND DEBRIDEMENT, WASHOUT AND ALL OTHER INDICATED PROCEDURES;  Surgeon: Mitch Wall MD;  Location: [facility] OR;  Service: Vascular;  Laterality: Left;  requesting 1st case start  THIS CASE 1ST PER DR WALL ON 2/1/19 @ 844AM -[place]  RN PREOP 2/6/2019  HAS CARDIAC CLEARANCE    EXAMINATION UNDER ANESTHESIA Left 12/28/2018    Procedure: Exam under anesthesia, left foot debridement, left foot washout, possible left second through fifth metatarsal  resection;  Surgeon: Mitch Chan MD;  Location: [facility] OR;  Service: Vascular;  Laterality: Left;  1:00pm start per julissa @ 8:58am  RN  PHONE PRE OP 12-27-18--=Pt coming from [facility] on [address]  NEED CONSENT    EXAMINATION UNDER ANESTHESIA Left 6/5/2019    Procedure: LEFT FOOT DEBRIDEMENT, WASHOUT AND ALL OTHER INDICATED PROCEDURES;  Surgeon: Mitch Chan MD;  Location: [facility] OR;  Service: Vascular;  Laterality: Left;  RN PRE OP 5-31-19------ICD------NEED CONSENT    INCISION AND DRAINAGE Left 11/14/2018    Procedure: Incision and Drainage, left lower extremity debridement, washout;  Surgeon: Mitch Chan MD;  Location: [facility] OR;  Service: Vascular;  Laterality: Left;    INCISION AND DRAINAGE Left 11/16/2018    Procedure: INCISION AND DRAINAGE;  Surgeon: Mitch Chan MD;  Location: [facility] OR;  Service: Vascular;  Laterality: Left;    INTRAOCULAR PROSTHESES INSERTION Right 9/26/2018    Procedure: INSERTION, IOL PROSTHESIS;  Surgeon: Perla Cortés MD;  Location: [facility] OR [address] [place] [address];  Service: Ophthalmology;  Laterality: Right;    PERITONEOCENTESIS N/A 3/1/2019    Procedure: PARACENTESIS, ABDOMINAL, INITIAL;  Surgeon: Dosc Diagnostic Provider;  Location: [facility] OR;  Service: Radiology;  Laterality: N/A;    PERITONEOCENTESIS N/A 3/25/2019    Procedure: PARACENTESIS, ABDOMINAL, INITIAL;  Surgeon: Dosc Diagnostic Provider;  Location: [facility] OR;  Service: Radiology;  Laterality: N/A;    PERITONEOCENTESIS N/A 7/22/2019    Procedure: PARACENTESIS, ABDOMINAL, INITIAL;  Surgeon: Dosc Diagnostic Provider;  Location: [facility] OR;  Service: Radiology;  Laterality: N/A;    PERITONEOCENTESIS N/A 8/16/2019    Procedure: PARACENTESIS, ABDOMINAL, INITIAL;  Surgeon: Dosc Diagnostic Provider;  Location: [facility] OR;  Service: Radiology;  Laterality: N/A;    PERITONEOCENTESIS N/A 9/26/2019    Procedure: PARACENTESIS, ABDOMINAL;  Surgeon: Dosc Diagnostic Provider;  Location: [facility] OR;  Service: Radiology;   Laterality: N/A;    PERITONEOCENTESIS N/A 10/11/2019    Procedure: PARACENTESIS, ABDOMINAL;  Surgeon: Dosc Diagnostic Provider;  Location: [facility] OR;  Service: Radiology;  Laterality: N/A;    PERITONEOCENTESIS N/A 10/31/2019    Procedure: PARACENTESIS, ABDOMINAL;  Surgeon: Dosc Diagnostic Provider;  Location: [facility] OR;  Service: Radiology;  Laterality: N/A;    PERITONEOCENTESIS N/A 11/18/2019    Procedure: PARACENTESIS, ABDOMINAL;  Surgeon: Dosc Diagnostic Provider;  Location: [facility] OR;  Service: Radiology;  Laterality: N/A;    PERITONEOCENTESIS N/A 12/16/2019    Procedure: PARACENTESIS, ABDOMINAL;  Surgeon: Dosc Diagnostic Provider;  Location: [facility] OR;  Service: Radiology;  Laterality: N/A;  2PM START    PHACOEMULSIFICATION OF CATARACT Right 9/26/2018    Procedure: PHACOEMULSIFICATION, CATARACT;  Surgeon: Perla Cortés MD;  Location: [address];  Service: Ophthalmology;  Laterality: Right;    REPEAT ABDOMINAL PARACENTESIS N/A 2/11/2019    Procedure: PARACENTESIS, ABDOMINAL, REPEAT;  Surgeon: Dos Diagnostic Provider;  Location: [facility] OR;  Service: Radiology;  Laterality: N/A;  1PM START    REPEAT ABDOMINAL PARACENTESIS N/A 9/12/2019    Procedure: PARACENTESIS, ABDOMINAL, REPEAT;  Surgeon: Dos Diagnostic Provider;  Location: [facility] OR;  Service: Radiology;  Laterality: N/A;  9AM START    REPEAT ABDOMINAL PARACENTESIS N/A 12/2/2019    Procedure: PARACENTESIS, ABDOMINAL, REPEAT;  Surgeon: Dos Diagnostic Provider;  Location: [facility] OR;  Service: Radiology;  Laterality: N/A;  3PM START    REPEAT ABDOMINAL PARACENTESIS N/A 1/10/2020    Procedure: PARACENTESIS, ABDOMINAL, REPEAT;  Surgeon: Dos Diagnostic Provider;  Location: [facility] OR;  Service: Radiology;  Laterality: N/A;  1130AM START    REVISION OF PROCEDURE INVOLVING GLAUCOMA DRAINAGE DEVICE Left 10/2/2019    EXTRUDING BAERVELDT /WITH PERICARDIAL PATCH GRAFT ()    Right foot surgery  10/2014    TOE AMPUTATION Right     first and second     TOE AMPUTATION Left 2018    Procedure: AMPUTATION, TOES  2-5;  Surgeon: Mitch Chan MD;  Location: [facility] OR;  Service: Vascular;  Laterality: Left;    TOE AMPUTATION Left 2018    Procedure: AMPUTATION, TOE;  Surgeon: Mitch Chan MD;  Location: [facility] OR;  Service: Vascular;  Laterality: Left;  left great toe    TONSILLECTOMY         ALLERGIES AND MEDICATION:     Review of patient's allergies indicates:   Allergen Reactions    Statins-hmg-coa reductase inhibitors      Generalized Pain    Onglyza [saxagliptin]     Penicillins Rash        Medication List      Notice    This visit is during an admission. Changes to the med list made in this visit will be reflected in the After Visit Summary of the admission.         SOCIAL HISTORY:     Social History     Socioeconomic History    Marital status: Single     Spouse name: Not on file    Number of children: Not on file    Years of education: 14    Highest education level: Not on file   Occupational History    Not on file   Social Needs    Financial resource strain: Not on file    Food insecurity:     Worry: Not on file     Inability: Not on file    Transportation needs:     Medical: Not on file     Non-medical: Not on file   Tobacco Use    Smoking status: Former Smoker     Packs/day: 1.00     Years: 3.00     Pack years: 3.00     Types: Cigarettes     Last attempt to quit: 1984     Years since quittin.5    Smokeless tobacco: Never Used   Substance and Sexual Activity    Alcohol use: Not Currently     Alcohol/week: 1.0 standard drinks     Types: 1 Cans of beer per week     Comment: rarely    Drug use: No    Sexual activity: Not Currently     Partners: Female   Lifestyle    Physical activity:     Days per week: Not on file     Minutes per session: Not on file    Stress: Not on file   Relationships    Social connections:     Talks on phone: Not on file     Gets together: Not on file     Attends Nondenominational service: Not on  file     Active member of club or organization: Not on file     Attends meetings of clubs or organizations: Not on file     Relationship status: Not on file   Other Topics Concern    Not on file   Social History Narrative    Not on file       FAMILY HISTORY:     Family History   Problem Relation Age of Onset    Diabetes Mother     Heart disease Brother     No Known Problems Father     No Known Problems Sister     No Known Problems Maternal Aunt     No Known Problems Maternal Uncle     No Known Problems Paternal Aunt     No Known Problems Paternal Uncle     No Known Problems Maternal Grandmother     No Known Problems Maternal Grandfather     No Known Problems Paternal Grandmother     No Known Problems Paternal Grandfather     Anemia Neg Hx     Arrhythmia Neg Hx     Asthma Neg Hx     Clotting disorder Neg Hx     Fainting Neg Hx     Heart attack Neg Hx     Heart failure Neg Hx     Hyperlipidemia Neg Hx     Hypertension Neg Hx     Stroke Neg Hx     Atrial Septal Defect Neg Hx     Amblyopia Neg Hx     Blindness Neg Hx     Glaucoma Neg Hx     Macular degeneration Neg Hx     Retinal detachment Neg Hx     Strabismus Neg Hx        REVIEW OF SYSTEMS:   Review of Systems   Constitutional: Negative for chills, diaphoresis, fever and malaise/fatigue.   HENT: Negative for nosebleeds.    Eyes: Negative for blurred vision, double vision and photophobia.   Respiratory: Negative for hemoptysis, shortness of breath and wheezing.    Cardiovascular: Negative for chest pain, palpitations, orthopnea, claudication, leg swelling and PND.   Gastrointestinal: Negative for abdominal pain, blood in stool, heartburn, melena, nausea and vomiting.   Genitourinary: Negative for flank pain and hematuria.   Musculoskeletal: Negative for falls, myalgias and neck pain.   Skin: Negative for rash.   Neurological: Negative for dizziness, seizures, loss of consciousness, weakness and headaches.   Endo/Heme/Allergies: Negative  "for polydipsia. Does not bruise/bleed easily.   Psychiatric/Behavioral: Negative for depression and memory loss. The patient is not nervous/anxious.        PHYSICAL EXAM:     Vitals:    01/20/20 1339   BP: 109/82   Pulse: 80   Resp: 15    Body mass index is 27.12 kg/m².  Weight: 85.7 kg (189 lb)   Height: 5' 10" (177.8 cm)     Physical Exam   Constitutional: He is oriented to person, place, and time. He appears well-developed and well-nourished. He is cooperative.  Non-toxic appearance. No distress.   HENT:   Head: Normocephalic and atraumatic.   Eyes: Pupils are equal, round, and reactive to light. Conjunctivae and EOM are normal. No scleral icterus.   Neck: Trachea normal and normal range of motion. Neck supple. No JVD present. No neck rigidity. No tracheal deviation and no edema present. No thyromegaly present.   Cardiovascular: Normal rate, regular rhythm, S1 normal and S2 normal. PMI is not displaced. Exam reveals no gallop and no friction rub.   No murmur heard.  Pulmonary/Chest: Effort normal and breath sounds normal. No stridor. No respiratory distress. He has no wheezes. He has no rales. He exhibits no tenderness.   Abdominal: Soft. He exhibits no distension. There is no hepatosplenomegaly.   Musculoskeletal: Normal range of motion. He exhibits no edema or tenderness.   L foot in boot   Feet:   Right Foot:   Skin Integrity: Negative for ulcer.   Left Foot:   Skin Integrity: Negative for ulcer.   Neurological: He is alert and oriented to person, place, and time. No cranial nerve deficit.   Skin: Skin is warm and dry. No rash noted. No erythema.   Psychiatric: He has a normal mood and affect. His speech is normal and behavior is normal.   Vitals reviewed.      DATA:   EKG: (personally reviewed tracing)  9/20/19 SR 74, ant MI-age indet, PVC    Laboratory:  CBC:  Recent Labs   Lab 04/23/19  1255 05/31/19  1025 08/22/19  0820   WBC 6.77 6.61 5.13   Hemoglobin 9.1 L 9.3 L 9.9 L   Hematocrit 28.8 L 29.1 L 32.0 L "   Platelets 357 H 303 319       CHEMISTRIES:  Recent Labs   Lab 03/12/19  0558  04/23/19  1255  05/31/19  1025 06/04/19  1300 06/10/19  1407 08/22/19  0820   Glucose 78   < > 111 H   < > 135 H 149 H 128 H 83   Sodium 138   < > 133 L   < > 133 L 135 L 136 140   Potassium 3.2 L   < > 3.4 L   < > 2.7 LL 3.3 L 4.2 3.6   BUN, Bld 71 H   < > 112 H   < > 64 H 63 H 51 H 39 H   Creatinine 1.2   < > 1.9 H   < > 1.3 1.2 1.3 1.1   eGFR if  >60.0   < > 43 A   < > >60 >60 >60 >60   eGFR if non  >60.0   < > 37 A   < > 59 A >60 59 A >60   Calcium 8.9   < > 9.3   < > 9.3 9.1 8.9 8.8   Magnesium 1.8  --  1.7  --  2.1  --   --   --     < > = values in this interval not displayed.       CARDIAC BIOMARKERS:  Recent Labs   Lab 12/11/18  2024 12/12/18  0226  01/30/19  1630 02/01/19  1100 02/05/19  1530 03/11/19  0335   CPK  --   --    < > 598 H 384 H 198  --    Troponin I 0.449 H 0.338 H  --   --   --   --  0.013    < > = values in this interval not displayed.       COAGS:  Recent Labs   Lab 03/10/19  2349 04/23/19  1255 05/31/19  1025   INR 1.0 1.0 1.1       LIPIDS/LFTS:  Recent Labs   Lab 07/27/18  0820  10/30/18  0955  03/18/19  0830 03/25/19  0920 08/22/19  0820   Cholesterol 151  --  107 L  --   --   --   --    Triglycerides 83  --  44  --   --   --   --    HDL 29 L  --  27 L  --   --   --   --    LDL Cholesterol 105.4  --  71.2  --   --   --   --    Non-HDL Cholesterol 122  --  80  --   --   --   --    AST 27   < >  --    < > 16 14 14   ALT 28   < >  --    < > 14 15 12    < > = values in this interval not displayed.       Cardiovascular Testing:  LE art US/SIDRA 11/4/19  1. Right lower extremity arterial ultrasound shows no hemodynamically significant stenosis.  Pressures indicate moderate arterial occlusive disease.  SIDRA 1.85.  2. Left lower extremity arterial ultrasound shows no hemodynamically significant stenosis.  Pressures indicate moderate to severe arterial occlusive disease. SIDRA 1.85.    LE  venous US/reflux 8/9/19  No evidence of lower extremity DVT bilaterally.  R GSV reflux (below knee).   vein noted below knee.  L GSV reflux (below knee).   vein noted below knee.  L Giacomini vein reflux.    Echo 12/11/18  · Severely decreased left ventricular systolic function. The estimated ejection fraction is 20%  · Severe global hypokinetic wall motion. Cannot exclude RWMA.  · Septal wall has abnormal motion. Systolic and diastolic flattening of the interventricular septum consistent with right ventricle pressure and volume overload.  · Left ventricular diastolic dysfunction.  · Mild right ventricular enlargement.  · Mildly to moderately reduced right ventricular systolic function.  · Moderate tricuspid regurgitation.  · There is a large left pleural effusion.    L MPI 10/17/19  · Abnormal myocardial perfusion study  · There is a moderate amount of infarct in the inferior wall(s).In the typical distribution of the RCA territory.  · Post Stress Ejection Fraction is 17 %    Cath 5/6/16 (subsequently seen by Dr. Mathews 5/12/16 and deemed to not be a surgical candidate)  B. Summary/Post-Operative Diagnosis    Three vessel coronary artery disease.    LV systolic and diastolic dysfunction.  D. Hemodynamic Results  LVEDP (Pre): 25 mmHg  LVEDP (Post): 30 mmHg  Ejection Fraction: 35%  E. Angiographic Results       Patient has a right dominant coronary artery.      - Left Main Coronary Artery:             The LM has luminal irregularities. There is BAKARI 3 flow.     - Left Anterior Descending Artery:             The proximal LAD has a 80% stenosis. There is BAKARI 3 flow.             The mid LAD has a 90% stenosis. There is BAKARI 3 flow. The remaining portion of the vessel is diffusely diseased.     - Left Circumflex Artery:             The mid LCX has a 60% stenosis. There is BAKARI 3 flow. The remaining portion of the vessel has luminal irregularities.     - Right Coronary Artery:             The proximal  RCA has a 60% stenosis. There is BAKARI 3 flow.             The mid RCA has a 80% stenosis. There is BAKARI 3 flow. The remaining portion of the vessel has luminal irregularities.     - Posterior Descending Artery:             The proximal PDA has a 60% stenosis. There is BAKARI 3 flow. The remaining portion of the vessel has luminal irregularities.     - Radial:             The radial.  G. Recommendations  1. Routine post-cath care.  2. Risk factor reductions.  3. ASA 81mg.  4. Statin therapy.  5. Consult CV Surgery.      ASSESSMENT:   # CAD/ICM, clinically stable  # [employer] ICD, functioning normally  # Statin allergy on zetia  # PAD followed by Dr. Chan    PLAN:   Continue medical therapy.  Cont aspirin 81 mg daily.  RTC 6 months.   [employer] ICD check in 1 year (Jan 2021).      Ismael Romano MD, FACC

## 2020-01-20 NOTE — DISCHARGE SUMMARY
Radiology Discharge Summary      Admit date: 1/20/2020 11:27 AM  Discharge date: January 20, 2020    Instructions Given to patient: YesVerbal    Diet: Regular    Activity:NO Restrictions    Medications on discharge (List): Refer to Discharge Medication List    Hospital Course: paracentesis, 5.9 L removed, Albumin given.    Description of Condition on Discharge: Chief Complaint Resolved    Discharge Disposition: Home    Discharge Diagnosis: refractory ascites.

## 2020-01-24 DIAGNOSIS — E78.5 HYPERLIPIDEMIA LDL GOAL <70: ICD-10-CM

## 2020-01-24 RX ORDER — EZETIMIBE 10 MG/1
TABLET ORAL
Qty: 90 TABLET | Refills: 0 | Status: SHIPPED | OUTPATIENT
Start: 2020-01-24 | End: 2020-04-16

## 2020-01-27 NOTE — TELEPHONE ENCOUNTER
Tried contacting pt to inform him that his refill on Xultophy for 90 days has been sent with 1 refill. LVM to contact if he has any further questions.

## 2020-01-28 ENCOUNTER — HOSPITAL ENCOUNTER (OUTPATIENT)
Dept: WOUND CARE | Facility: HOSPITAL | Age: 60
Discharge: HOME | End: 2020-01-28
Attending: FAMILY MEDICINE
Payer: MEDICAID

## 2020-01-28 DIAGNOSIS — L97.529 TYPE 2 DIABETES MELLITUS WITH LEFT DIABETIC FOOT ULCER: Primary | ICD-10-CM

## 2020-01-28 DIAGNOSIS — E11.621 TYPE 2 DIABETES MELLITUS WITH LEFT DIABETIC FOOT ULCER: Primary | ICD-10-CM

## 2020-01-28 DIAGNOSIS — R20.2 PARESTHESIA: ICD-10-CM

## 2020-01-28 DIAGNOSIS — I50.43 ACUTE ON CHRONIC COMBINED SYSTOLIC AND DIASTOLIC CONGESTIVE HEART FAILURE: Primary | ICD-10-CM

## 2020-01-28 PROCEDURE — 99214 OFFICE O/P EST MOD 30 MIN: CPT | Performed by: FAMILY MEDICINE

## 2020-01-28 PROCEDURE — 99214 PR OFFICE/OUTPT VISIT, EST, LEVL IV, 30-39 MIN: ICD-10-PCS | Mod: ,,, | Performed by: FAMILY MEDICINE

## 2020-01-28 PROCEDURE — 99214 OFFICE O/P EST MOD 30 MIN: CPT | Mod: ,,, | Performed by: FAMILY MEDICINE

## 2020-01-28 RX ORDER — GABAPENTIN 100 MG/1
200 CAPSULE ORAL NIGHTLY
Qty: 60 CAPSULE | Refills: 1 | Status: SHIPPED | OUTPATIENT
Start: 2020-01-28 | End: 2020-03-30

## 2020-01-28 RX ORDER — TORSEMIDE 20 MG/1
80 TABLET ORAL 2 TIMES DAILY
Qty: 240 TABLET | Refills: 2 | Status: SHIPPED | OUTPATIENT
Start: 2020-01-28 | End: 2020-02-26 | Stop reason: SDUPTHER

## 2020-01-28 NOTE — TELEPHONE ENCOUNTER
Spoke with Higinio in the PA dept. He has done the Pa and stated he is now sending the information to be approved or denied and will fax over the information within 24 hours.

## 2020-01-28 NOTE — TELEPHONE ENCOUNTER
Please do PA for Xultophy.   Patient has tried and failed Levemir, metformin, Onglyza, and Januvia because he was unable to meet glycemic goals on them.

## 2020-01-30 ENCOUNTER — PATIENT OUTREACH (OUTPATIENT)
Dept: ADMINISTRATIVE | Facility: OTHER | Age: 60
End: 2020-01-30

## 2020-01-31 ENCOUNTER — HOSPITAL ENCOUNTER (OUTPATIENT)
Facility: HOSPITAL | Age: 60
Discharge: HOME | End: 2020-01-31
Attending: RADIOLOGY | Admitting: RADIOLOGY
Payer: MEDICAID

## 2020-01-31 VITALS
HEART RATE: 82 BPM | TEMPERATURE: 98 F | OXYGEN SATURATION: 95 % | DIASTOLIC BLOOD PRESSURE: 65 MMHG | SYSTOLIC BLOOD PRESSURE: 123 MMHG | RESPIRATION RATE: 17 BRPM

## 2020-01-31 DIAGNOSIS — R18.8 OTHER ASCITES: ICD-10-CM

## 2020-01-31 PROCEDURE — 63600175 PHARM REV CODE 636 W HCPCS: Mod: JG | Performed by: RADIOLOGY

## 2020-01-31 PROCEDURE — P9047 ALBUMIN (HUMAN), 25%, 50ML: HCPCS | Mod: JG | Performed by: RADIOLOGY

## 2020-01-31 RX ORDER — ALBUMIN HUMAN 250 G/1000ML
50 SOLUTION INTRAVENOUS ONCE AS NEEDED
Status: COMPLETED | OUTPATIENT
Start: 2020-01-31 | End: 2020-01-31

## 2020-01-31 RX ADMIN — ALBUMIN (HUMAN) 50 G: 12.5 SOLUTION INTRAVENOUS at 11:01

## 2020-01-31 NOTE — DISCHARGE SUMMARY
Radiology Discharge Summary      Admit date: 1/31/2020 10:31 AM  Discharge date: January 31, 2020    Instructions Given to patient: YesVerbal    Diet: Regular    Activity:NO Restrictions    Medications on discharge (List): Refer to Discharge Medication List    Hospital Course: U/S guided paracentesis    Description of Condition on Discharge: stable    Discharge Disposition: Home    Discharge Diagnosis: Recurrent ascites    Follow up as scheduled.

## 2020-01-31 NOTE — H&P
[facility]  History & Physical - Short Stay  Interventional Radiology    SUBJECTIVE:     Chief Complaint/Reason for Admission: Recurrent ascites    Informant(s):  self and Electronic Health Record    History of Present Illness:  Marvin Ray is a 59 y.o. male with a history of ascites.    Patient presents for paracentesis.    Scheduled Meds:   Continuous Infusions:   PRN Meds:     Review of patient's allergies indicates:   Allergen Reactions    Statins-hmg-coa reductase inhibitors      Generalized Pain    Onglyza [saxagliptin]     Penicillins Rash       Past Medical History:   Diagnosis Date    Arthritis     Cellulitis     CKD (chronic kidney disease), stage III     Coronary artery disease     Diabetes mellitus     Diabetic retinopathy     Diabetic ulcer of left foot     Glaucoma     Gout     Hyperlipemia     Hypertension     ICD (implantable cardioverter-defibrillator) in place 11/02/2018    Left chest    Non-pressure chronic ulcer of other part of left foot with fat layer exposed 10/23/2018    PVD (peripheral vascular disease)     Type 2 diabetes mellitus with left diabetic foot ulcer 10/29/2018    Unsteady gait     uses a wheelchair     Past Surgical History:   Procedure Laterality Date    AHMED GLAUCOMA IMPLANT Left 2011    DONE AT [facility]    AORTOGRAPHY WITH SERIALOGRAPHY Left 4/30/2019    Procedure: AORTOGRAM, WITH SERIALOGRAPHY, LEFT LOWER EXTREMITY, POSSIBLE INTERVENTION;  Surgeon: Mitch Chan MD;  Location: [facility] OR;  Service: Vascular;  Laterality: Left;  RN PRE OP 4-23-19.  NO H & P, No Cosent  [place]    BAERVELDT GLAUCOMA IMPLANT Left 2012    WITH CATARACT EXTRACTION//DONE AT [facility]    CARDIAC CATHETERIZATION Left 05/2016    CARDIAC DEFIBRILLATOR PLACEMENT Left 11/02/2018    CATARACT EXTRACTION W/  INTRAOCULAR LENS IMPLANT Left 2012    WITH BAERVEDT//DONE AT [facility]    CATARACT EXTRACTION W/  INTRAOCULAR LENS IMPLANT Right 09/26/2018    COMPLEX  ()    CB DESTRUCTION WITH CYCLO G6 Left 02/15/2017        CYST REMOVAL      DEBRIDEMENT OF FOOT  12/28/2018    Procedure: DEBRIDEMENT, FOOT;  Surgeon: Mitch Wall MD;  Location: [facility] OR;  Service: Vascular;;    DEBRIDEMENT OF FOOT  6/5/2019    Procedure: DEBRIDEMENT, FOOT;  Surgeon: Mitch Wall MD;  Location: [facility] OR;  Service: Vascular;;    EXAMINATION UNDER ANESTHESIA Left 12/5/2018    Procedure: Exam under anesthesia, left foot debridement, washout and all other indicated procedures;  Surgeon: Mitch Wall MD;  Location: [facility] OR;  Service: Vascular;  Laterality: Left;  1030AM START  RN PREOP 12/3/2018-----AICD  SEEN BY DR JAMES 12/4    EXAMINATION UNDER ANESTHESIA Left 2/13/2019    Procedure: LEFT FOOT WOUND DEBRIDEMENT, WASHOUT AND ALL OTHER INDICATED PROCEDURES;  Surgeon: Mitch Wall MD;  Location: [facility] OR;  Service: Vascular;  Laterality: Left;  requesting 1st case start  THIS CASE 1ST PER DR WALL ON 2/1/19 @ 844AM -  RN PREOP 2/6/2019  HAS CARDIAC CLEARANCE    EXAMINATION UNDER ANESTHESIA Left 12/28/2018    Procedure: Exam under anesthesia, left foot debridement, left foot washout, possible left second through fifth metatarsal resection;  Surgeon: Mitch Wall MD;  Location: [facility] OR;  Service: Vascular;  Laterality: Left;  1:00pm start per julissa @ 8:58am  RN  PHONE PRE OP 12-27-18--=[facility]  NEED CONSENT    EXAMINATION UNDER ANESTHESIA Left 6/5/2019    Procedure: LEFT FOOT DEBRIDEMENT, WASHOUT AND ALL OTHER INDICATED PROCEDURES;  Surgeon: Mitch Wall MD;  Location: [facility] OR;  Service: Vascular;  Laterality: Left;  RN PRE OP 5-31-19------ICD------NEED CONSENT    INCISION AND DRAINAGE Left 11/14/2018    Procedure: Incision and Drainage, left lower extremity debridement, washout;  Surgeon: Mitch Wall MD;  Location: [facility] OR;  Service: Vascular;  Laterality: Left;    INCISION AND  DRAINAGE Left 11/16/2018    Procedure: INCISION AND DRAINAGE;  Surgeon: Mitch Chan MD;  Location: [facility] OR;  Service: Vascular;  Laterality: Left;    INTRAOCULAR PROSTHESES INSERTION Right 9/26/2018    Procedure: INSERTION, IOL PROSTHESIS;  Surgeon: Perla Cortés MD;  Location: [facility];  Service: Ophthalmology;  Laterality: Right;    PERITONEOCENTESIS N/A 3/1/2019    Procedure: PARACENTESIS, ABDOMINAL, INITIAL;  Surgeon: Dosc Diagnostic Provider;  Location: [facility] OR;  Service: Radiology;  Laterality: N/A;    PERITONEOCENTESIS N/A 3/25/2019    Procedure: PARACENTESIS, ABDOMINAL, INITIAL;  Surgeon: Dosc Diagnostic Provider;  Location: [facility] OR;  Service: Radiology;  Laterality: N/A;    PERITONEOCENTESIS N/A 7/22/2019    Procedure: PARACENTESIS, ABDOMINAL, INITIAL;  Surgeon: Dosc Diagnostic Provider;  Location: [facility] OR;  Service: Radiology;  Laterality: N/A;    PERITONEOCENTESIS N/A 8/16/2019    Procedure: PARACENTESIS, ABDOMINAL, INITIAL;  Surgeon: Dosc Diagnostic Provider;  Location: [facility] OR;  Service: Radiology;  Laterality: N/A;    PERITONEOCENTESIS N/A 9/26/2019    Procedure: PARACENTESIS, ABDOMINAL;  Surgeon: Dosc Diagnostic Provider;  Location: [facility] OR;  Service: Radiology;  Laterality: N/A;    PERITONEOCENTESIS N/A 10/11/2019    Procedure: PARACENTESIS, ABDOMINAL;  Surgeon: Dosc Diagnostic Provider;  Location: [facility] OR;  Service: Radiology;  Laterality: N/A;    PERITONEOCENTESIS N/A 10/31/2019    Procedure: PARACENTESIS, ABDOMINAL;  Surgeon: Dosc Diagnostic Provider;  Location: [facility] OR;  Service: Radiology;  Laterality: N/A;    PERITONEOCENTESIS N/A 11/18/2019    Procedure: PARACENTESIS, ABDOMINAL;  Surgeon: Dosc Diagnostic Provider;  Location: [facility] OR;  Service: Radiology;  Laterality: N/A;    PERITONEOCENTESIS N/A 12/16/2019    Procedure: PARACENTESIS, ABDOMINAL;  Surgeon: Dosc Diagnostic Provider;  Location: [facility] OR;  Service: Radiology;  Laterality: N/A;  2PM START     PHACOEMULSIFICATION OF CATARACT Right 9/26/2018    Procedure: PHACOEMULSIFICATION, CATARACT;  Surgeon: Perla Cortés MD;  Location: [address];  Service: Ophthalmology;  Laterality: Right;    REPEAT ABDOMINAL PARACENTESIS N/A 2/11/2019    Procedure: PARACENTESIS, ABDOMINAL, REPEAT;  Surgeon: [facility] Diagnostic Provider;  Location: [facility] OR;  Service: Radiology;  Laterality: N/A;  1PM START    REPEAT ABDOMINAL PARACENTESIS N/A 9/12/2019    Procedure: PARACENTESIS, ABDOMINAL, REPEAT;  Surgeon: [facility] Diagnostic Provider;  Location: [facility] OR;  Service: Radiology;  Laterality: N/A;  9AM START    REPEAT ABDOMINAL PARACENTESIS N/A 12/2/2019    Procedure: PARACENTESIS, ABDOMINAL, REPEAT;  Surgeon: [facility] Diagnostic Provider;  Location: [facility] OR;  Service: Radiology;  Laterality: N/A;  3PM START    REPEAT ABDOMINAL PARACENTESIS N/A 1/10/2020    Procedure: PARACENTESIS, ABDOMINAL, REPEAT;  Surgeon: [facility] Diagnostic Provider;  Location: [facility] OR;  Service: Radiology;  Laterality: N/A;  1130AM START    REPEAT ABDOMINAL PARACENTESIS N/A 1/20/2020    Procedure: PARACENTESIS, ABDOMINAL, REPEAT;  Surgeon: [facility] Diagnostic Provider;  Location: [facility] OR;  Service: Radiology;  Laterality: N/A;  1PM START    REVISION OF PROCEDURE INVOLVING GLAUCOMA DRAINAGE DEVICE Left 10/2/2019    EXTRUDING BAERVELDT /WITH PERICARDIAL PATCH GRAFT ()    Right foot surgery  10/2014    TOE AMPUTATION Right     first and second    TOE AMPUTATION Left 11/16/2018    Procedure: AMPUTATION, TOES  2-5;  Surgeon: Mitch Chan MD;  Location: [facility] OR;  Service: Vascular;  Laterality: Left;    TOE AMPUTATION Left 12/5/2018    Procedure: AMPUTATION, TOE;  Surgeon: Mitch Chan MD;  Location: [facility] OR;  Service: Vascular;  Laterality: Left;  left great toe    TONSILLECTOMY       Family History   Problem Relation Age of Onset    Diabetes Mother     Heart disease Brother     No Known Problems Father     No Known Problems Sister      No Known Problems Maternal Aunt     No Known Problems Maternal Uncle     No Known Problems Paternal Aunt     No Known Problems Paternal Uncle     No Known Problems Maternal Grandmother     No Known Problems Maternal Grandfather     No Known Problems Paternal Grandmother     No Known Problems Paternal Grandfather     Anemia Neg Hx     Arrhythmia Neg Hx     Asthma Neg Hx     Clotting disorder Neg Hx     Fainting Neg Hx     Heart attack Neg Hx     Heart failure Neg Hx     Hyperlipidemia Neg Hx     Hypertension Neg Hx     Stroke Neg Hx     Atrial Septal Defect Neg Hx     Amblyopia Neg Hx     Blindness Neg Hx     Glaucoma Neg Hx     Macular degeneration Neg Hx     Retinal detachment Neg Hx     Strabismus Neg Hx      Social History     Tobacco Use    Smoking status: Former Smoker     Packs/day: 1.00     Years: 3.00     Pack years: 3.00     Types: Cigarettes     Last attempt to quit: 1984     Years since quittin.5    Smokeless tobacco: Never Used   Substance Use Topics    Alcohol use: Not Currently     Alcohol/week: 1.0 standard drinks     Types: 1 Cans of beer per week     Comment: rarely    Drug use: No        Review of Systems:  ROS not obtained    OBJECTIVE:     Vital Signs (Most Recent):  BP: 127/65 (20 1115)    Physical Exam:  Lungs: No respiratory distress  Cardiac: regular rate and rhythm  Alert and oriented    Laboratory  CBC:   Lab Results   Component Value Date/Time    WBC 5.13 2019 08:20 AM    RBC 3.73 (L) 2019 08:20 AM    HGB 9.9 (L) 2019 08:20 AM    HCT 32.0 (L) 2019 08:20 AM     2019 08:20 AM    MCV 86 2019 08:20 AM    MCH 26.5 (L) 2019 08:20 AM    MCHC 30.9 (L) 2019 08:20 AM     Coagulation:   Lab Results   Component Value Date/Time    INR 1.1 2019 10:25 AM    APTT 28.3 2019 10:25 AM         ASSESSMENT/PLAN:     Ascites.    Patient will undergo paracentesis.      Sedation Plan: Lidocaine local  only

## 2020-01-31 NOTE — DISCHARGE INSTRUCTIONS
BATHING:  ? You may shower tomorrow.  DRESSING:  ? Remove dressing tomorrow.        ACTIVITY LEVEL: If you have received sedation or an anesthetic, you may feel sleepy for several hours. Rest until you are more awake. Gradually resume your normal activities    Do not drive, drink alcohol, or sign legal documents for 24 hours, or if taking narcotic pain medication.      DIET: You may resume your home diet. If nausea is present, increase your diet gradually with fluids and bland foods.    Medications: Pain medication should be taken only if needed and as directed. If antibiotics are prescribed, the medication should be taken until completed. You will be given an updated list of you medications.  ? No driving, alcoholic beverages or signing legal documents for next 24 hours if you have had sedation, or while taking pain medication    CALL THE DOCTOR:   For any obvious bleeding (some dried blood over the incision is normal).     Redness, swelling, foul smell around incision or fever over 101.  Shortness of breath.  Persistent pain or nausea not relieved by medication.  Call  [phone]     to speak with an Interventional Radiologist    If any unusual problems or difficulties occur contact your doctor. If you cannot contact your doctor but feel your signs and symptoms warrant a physicians attention return to the emergency room.

## 2020-01-31 NOTE — PROGRESS NOTES
Report called to JILLIAN Mckay in SDS. Paracentesis completed. 6.9L removed from right lateral abdomen. Site dressed with bandaid, CDI, no redness, swelling or hematoma noted. Tolerated well. EDDI LANGSTON. Transported to [facility] per RN.

## 2020-01-31 NOTE — OP NOTE
[facility]  Interventional Radiology  High Risk Procedure - Outpatient    Date: 01/31/2020 Time: 11:18 AM    Pre-Op Diagnosis: Recurrent ascites    Post-Op Diagnosis: same    Procedure Performed by: Matheus Marinelli MD    Assistant: none    Procedure: U/S guided paracentesis    Specimen/Tissue Removed: 6900 mL of clear yellow fluid    Estimated Blood Loss: Less than 5 mL    Procedure Note/Findings: U/S guided paracentesis with 5 Uzbek centesis needle via RLQ approach. No immediate post-procedure complications noted.            Please refer to dictated report for additional details.

## 2020-02-03 ENCOUNTER — OFFICE VISIT (OUTPATIENT)
Dept: VASCULAR SURGERY | Facility: CLINIC | Age: 60
End: 2020-02-03
Payer: MEDICAID

## 2020-02-03 VITALS
BODY MASS INDEX: 27.05 KG/M2 | HEIGHT: 70 IN | SYSTOLIC BLOOD PRESSURE: 110 MMHG | DIASTOLIC BLOOD PRESSURE: 60 MMHG | WEIGHT: 188.94 LBS

## 2020-02-03 DIAGNOSIS — I70.245 ATHEROSCLEROSIS OF NATIVE ARTERY OF LEFT LOWER EXTREMITY WITH ULCERATION OF OTHER PART OF FOOT: Primary | ICD-10-CM

## 2020-02-03 PROCEDURE — 99999 PR PBB SHADOW E&M-EST. PATIENT-LVL III: ICD-10-PCS | Mod: PBBFAC,,, | Performed by: SURGERY

## 2020-02-03 PROCEDURE — 99213 OFFICE O/P EST LOW 20 MIN: CPT | Mod: PBBFAC | Performed by: SURGERY

## 2020-02-03 PROCEDURE — 99214 PR OFFICE/OUTPT VISIT, EST, LEVL IV, 30-39 MIN: ICD-10-PCS | Mod: S$PBB,,, | Performed by: SURGERY

## 2020-02-03 PROCEDURE — 99214 OFFICE O/P EST MOD 30 MIN: CPT | Mod: S$PBB,,, | Performed by: SURGERY

## 2020-02-03 PROCEDURE — 99999 PR PBB SHADOW E&M-EST. PATIENT-LVL III: CPT | Mod: PBBFAC,,, | Performed by: SURGERY

## 2020-02-03 NOTE — PROGRESS NOTES
Mitch Chan MD [facility]                         02/03/2020    HPI:  Marvin Ray is a 59 y.o. male with   Patient Active Problem List   Diagnosis    Epiretinal membrane, both eyes    Nuclear sclerotic cataract of right eye    Pseudophakia, left eye    Ptosis, left eyelid    DM type 2 with diabetic peripheral neuropathy    HLD (hyperlipidemia)    Neovascular glaucoma of both eyes    Tophaceous gout    Essential hypertension    Coronary artery disease involving native coronary artery of native heart without angina pectoris    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 edema, with long-term current use of insulin    Neovascular glaucoma of left eye, severe stage    Statin myopathy    Neovascular glaucoma, right eye, mild stage    Left leg cellulitis    PVD (peripheral vascular disease)    Right wrist pain    Multiple open wounds of lower leg    Hepatosplenomegaly    Type 2 diabetes mellitus with left diabetic foot ulcer    Open wound of left foot    Cellulitis of left lower extremity    Diabetic foot infection    Other ascites    Severe malnutrition    Goals of care, counseling/discussion    Alteration in skin integrity    MDR Acinetobacter baumannii infection    Takes dietary supplements    Intertriginous dermatitis associated with moisture    Debility    Infection due to multidrug-resistant Pseudomonas aeruginosa    Subacute osteomyelitis of left foot    Ascites    Acute on chronic combined systolic and diastolic congestive heart failure    Stage 3 chronic kidney disease    Atherosclerosis of left lower extremity with ulceration of midfoot    CKD stage 3 due to type 2 diabetes mellitus    Azotemia    Hyponatremia    Anemia due to multiple mechanisms    Persistent proteinuria    Hypokalemia    Open wound of foot, left, subsequent encounter    Disorder  due to insertion of glaucoma drainage device    Ischemic cardiomyopathy    being managed by PCP and specialists who is here today for evaluation of L foot wound.  Seen in hospital last mo with LLE cellulitis.  Treated with Abx.  Also had paracentesis for ascites with negative w/u per family.  Patient states location is L foot occurring for 1-2 mo, worsening foot wound although improvement in edema and erythema.  Associated signs and symptoms include pain and edema.  Quality is aching and severity is 5/10.  Symptoms began 10/2018 spontaneously with blistering and severe edema.  Alleviating factors include wound care and elevation.  Worsening factors include pressure.    Tobacco use: denies    1/4/19: s/p LLE angioplasty and multiple subsequent OR and bedside debridements.  On IV Abx per culture results.  Glucose better controlled.  No fevers.  Receiving local wound care with Santyl.    1/14/19:  Cont to receive local wound care.  Pseudomonas in tissue and bone cultures multidrug resistant other than aminoglycoside; d/w Dr. Velez with high risk of ESRD with 6 weeks of aminoglycoside treatment.  No F/C.    1/31/19:  Pt without complaints.  Was started back on Bactrim.  Family doing wound care.    2/28/19: S/p L foot debridement 2/13/19.  Started on Meropenem.    3/28/19:  No complaints.  S/p paracentesis and pt feels better.    5/16/19:  S/p L peroneal and AT angioplasty, wound healing well.    9/30/19:  S/p L foot debridement 6/5/19. WV placed.    11/2019:  States wound is healing well.  Did not see Plastic Surgery clinic.  Cont wound care center and home health wound care.    Interval history: States wound nearly fully healed.  Receiving wound care daily.    Past Medical History:   Diagnosis Date    Arthritis     Cellulitis     CKD (chronic kidney disease), stage III     Coronary artery disease     Diabetes mellitus     Diabetic retinopathy     Diabetic ulcer of left foot     Glaucoma     Gout      Hyperlipemia     Hypertension     ICD (implantable cardioverter-defibrillator) in place 11/02/2018    Left chest    Non-pressure chronic ulcer of other part of left foot with fat layer exposed 10/23/2018    PVD (peripheral vascular disease)     Type 2 diabetes mellitus with left diabetic foot ulcer 10/29/2018    Unsteady gait     uses a wheelchair     Past Surgical History:   Procedure Laterality Date    AHMED GLAUCOMA IMPLANT Left 2011    DONE AT [facility]    AORTOGRAPHY WITH SERIALOGRAPHY Left 4/30/2019    Procedure: AORTOGRAM, WITH SERIALOGRAPHY, LEFT LOWER EXTREMITY, POSSIBLE INTERVENTION;  Surgeon: Mitch Chan MD;  Location: [facility] OR;  Service: Vascular;  Laterality: Left;  RN PRE OP 4-23-19.  NO H & P, No Cosent  [place]    BAERVELDT GLAUCOMA IMPLANT Left 2012    WITH CATARACT EXTRACTION//DONE AT [facility]    CARDIAC CATHETERIZATION Left 05/2016    CARDIAC DEFIBRILLATOR PLACEMENT Left 11/02/2018    CATARACT EXTRACTION W/  INTRAOCULAR LENS IMPLANT Left 2012    WITH BAERVEDT//DONE AT [facility]    CATARACT EXTRACTION W/  INTRAOCULAR LENS IMPLANT Right 09/26/2018    COMPLEX ()    CB DESTRUCTION WITH CYCLO G6 Left 02/15/2017        CYST REMOVAL      DEBRIDEMENT OF FOOT  12/28/2018    Procedure: DEBRIDEMENT, FOOT;  Surgeon: Mitch Chan MD;  Location: [facility] OR;  Service: Vascular;;    DEBRIDEMENT OF FOOT  6/5/2019    Procedure: DEBRIDEMENT, FOOT;  Surgeon: Mitch Chan MD;  Location: [facility] OR;  Service: Vascular;;    EXAMINATION UNDER ANESTHESIA Left 12/5/2018    Procedure: Exam under anesthesia, left foot debridement, washout and all other indicated procedures;  Surgeon: Mitch Chan MD;  Location: [facility] OR;  Service: Vascular;  Laterality: Left;  1030AM START  RN PREOP 12/3/2018-----AICD  SEEN BY DR JAMES 12/4    EXAMINATION UNDER ANESTHESIA Left 2/13/2019    Procedure: LEFT FOOT WOUND DEBRIDEMENT, WASHOUT AND ALL OTHER INDICATED PROCEDURES;  Surgeon:  Mitch Wall MD;  Location: [facility] OR;  Service: Vascular;  Laterality: Left;  requesting 1st case start  THIS CASE 1ST PER DR WALL ON 2/1/19 @ 844AM -LO  RN PREOP 2/6/2019  HAS CARDIAC CLEARANCE    EXAMINATION UNDER ANESTHESIA Left 12/28/2018    Procedure: Exam under anesthesia, left foot debridement, left foot washout, possible left second through fifth metatarsal resection;  Surgeon: Mitch Wall MD;  Location: [facility] OR;  Service: Vascular;  Laterality: Left;  1:00pm start per julissa @ 8:58am  RN  PHONE PRE OP 12-27-18--=Pt coming from [facility] on [address]  NEED CONSENT    EXAMINATION UNDER ANESTHESIA Left 6/5/2019    Procedure: LEFT FOOT DEBRIDEMENT, WASHOUT AND ALL OTHER INDICATED PROCEDURES;  Surgeon: Mitch Wall MD;  Location: [facility] OR;  Service: Vascular;  Laterality: Left;  RN PRE OP 5-31-19------ICD------NEED CONSENT    INCISION AND DRAINAGE Left 11/14/2018    Procedure: Incision and Drainage, left lower extremity debridement, washout;  Surgeon: Mitch Wall MD;  Location: [facility] OR;  Service: Vascular;  Laterality: Left;    INCISION AND DRAINAGE Left 11/16/2018    Procedure: INCISION AND DRAINAGE;  Surgeon: Mitch Wall MD;  Location: [facility] OR;  Service: Vascular;  Laterality: Left;    INTRAOCULAR PROSTHESES INSERTION Right 9/26/2018    Procedure: INSERTION, IOL PROSTHESIS;  Surgeon: Perla Cortés MD;  Location: [address];  Service: Ophthalmology;  Laterality: Right;    PERITONEOCENTESIS N/A 3/1/2019    Procedure: PARACENTESIS, ABDOMINAL, INITIAL;  Surgeon: Dos Diagnostic Provider;  Location: [facility] OR;  Service: Radiology;  Laterality: N/A;    PERITONEOCENTESIS N/A 3/25/2019    Procedure: PARACENTESIS, ABDOMINAL, INITIAL;  Surgeon: Dos Diagnostic Provider;  Location: [facility] OR;  Service: Radiology;  Laterality: N/A;    PERITONEOCENTESIS N/A 7/22/2019    Procedure: PARACENTESIS, ABDOMINAL, INITIAL;  Surgeon: Dosc Diagnostic Provider;  Location: [facility]  OR;  Service: Radiology;  Laterality: N/A;    PERITONEOCENTESIS N/A 8/16/2019    Procedure: PARACENTESIS, ABDOMINAL, INITIAL;  Surgeon: Dosc Diagnostic Provider;  Location: [facility] OR;  Service: Radiology;  Laterality: N/A;    PERITONEOCENTESIS N/A 9/26/2019    Procedure: PARACENTESIS, ABDOMINAL;  Surgeon: Dosc Diagnostic Provider;  Location: [facility] OR;  Service: Radiology;  Laterality: N/A;    PERITONEOCENTESIS N/A 10/11/2019    Procedure: PARACENTESIS, ABDOMINAL;  Surgeon: Dosc Diagnostic Provider;  Location: [facility] OR;  Service: Radiology;  Laterality: N/A;    PERITONEOCENTESIS N/A 10/31/2019    Procedure: PARACENTESIS, ABDOMINAL;  Surgeon: Dosc Diagnostic Provider;  Location: [facility] OR;  Service: Radiology;  Laterality: N/A;    PERITONEOCENTESIS N/A 11/18/2019    Procedure: PARACENTESIS, ABDOMINAL;  Surgeon: Dosc Diagnostic Provider;  Location: [facility] OR;  Service: Radiology;  Laterality: N/A;    PERITONEOCENTESIS N/A 12/16/2019    Procedure: PARACENTESIS, ABDOMINAL;  Surgeon: Dos Diagnostic Provider;  Location: [facility] OR;  Service: Radiology;  Laterality: N/A;  2PM START    PHACOEMULSIFICATION OF CATARACT Right 9/26/2018    Procedure: PHACOEMULSIFICATION, CATARACT;  Surgeon: Perla Cortés MD;  Location: [address];  Service: Ophthalmology;  Laterality: Right;    REPEAT ABDOMINAL PARACENTESIS N/A 2/11/2019    Procedure: PARACENTESIS, ABDOMINAL, REPEAT;  Surgeon: Dosc Diagnostic Provider;  Location: [facility] OR;  Service: Radiology;  Laterality: N/A;  1PM START    REPEAT ABDOMINAL PARACENTESIS N/A 9/12/2019    Procedure: PARACENTESIS, ABDOMINAL, REPEAT;  Surgeon: Dosc Diagnostic Provider;  Location: [facility] OR;  Service: Radiology;  Laterality: N/A;  9AM START    REPEAT ABDOMINAL PARACENTESIS N/A 12/2/2019    Procedure: PARACENTESIS, ABDOMINAL, REPEAT;  Surgeon: Dosc Diagnostic Provider;  Location: [facility] OR;  Service: Radiology;  Laterality: N/A;  3PM START    REPEAT ABDOMINAL PARACENTESIS N/A 1/10/2020     Procedure: PARACENTESIS, ABDOMINAL, REPEAT;  Surgeon: [facility] Diagnostic Provider;  Location: [facility] OR;  Service: Radiology;  Laterality: N/A;  1130AM START    REPEAT ABDOMINAL PARACENTESIS N/A 1/20/2020    Procedure: PARACENTESIS, ABDOMINAL, REPEAT;  Surgeon: [facility] Diagnostic Provider;  Location: [facility] OR;  Service: Radiology;  Laterality: N/A;  1PM START    REPEAT ABDOMINAL PARACENTESIS N/A 1/31/2020    Procedure: PARACENTESIS, ABDOMINAL, REPEAT;  Surgeon: [facility] Diagnostic Provider;  Location: [facility] OR;  Service: Radiology;  Laterality: N/A;  10AM START    REVISION OF PROCEDURE INVOLVING GLAUCOMA DRAINAGE DEVICE Left 10/2/2019    EXTRUDING BAERVELDT /WITH PERICARDIAL PATCH GRAFT ()    Right foot surgery  10/2014    TOE AMPUTATION Right     first and second    TOE AMPUTATION Left 11/16/2018    Procedure: AMPUTATION, TOES  2-5;  Surgeon: Mitch Chan MD;  Location: [facility] OR;  Service: Vascular;  Laterality: Left;    TOE AMPUTATION Left 12/5/2018    Procedure: AMPUTATION, TOE;  Surgeon: Mitch Chan MD;  Location: [facility] OR;  Service: Vascular;  Laterality: Left;  left great toe    TONSILLECTOMY       Family History   Problem Relation Age of Onset    Diabetes Mother     Heart disease Brother     No Known Problems Father     No Known Problems Sister     No Known Problems Maternal Aunt     No Known Problems Maternal Uncle     No Known Problems Paternal Aunt     No Known Problems Paternal Uncle     No Known Problems Maternal Grandmother     No Known Problems Maternal Grandfather     No Known Problems Paternal Grandmother     No Known Problems Paternal Grandfather     Anemia Neg Hx     Arrhythmia Neg Hx     Asthma Neg Hx     Clotting disorder Neg Hx     Fainting Neg Hx     Heart attack Neg Hx     Heart failure Neg Hx     Hyperlipidemia Neg Hx     Hypertension Neg Hx     Stroke Neg Hx     Atrial Septal Defect Neg Hx     Amblyopia Neg Hx     Blindness Neg Hx     Glaucoma  Neg Hx     Macular degeneration Neg Hx     Retinal detachment Neg Hx     Strabismus Neg Hx      Social History     Socioeconomic History    Marital status: Single     Spouse name: Not on file    Number of children: Not on file    Years of education: 14    Highest education level: Not on file   Occupational History    Not on file   Social Needs    Financial resource strain: Not on file    Food insecurity:     Worry: Not on file     Inability: Not on file    Transportation needs:     Medical: Not on file     Non-medical: Not on file   Tobacco Use    Smoking status: Former Smoker     Packs/day: 1.00     Years: 3.00     Pack years: 3.00     Types: Cigarettes     Last attempt to quit: 1984     Years since quittin.5    Smokeless tobacco: Never Used   Substance and Sexual Activity    Alcohol use: Not Currently     Alcohol/week: 1.0 standard drinks     Types: 1 Cans of beer per week     Comment: rarely    Drug use: No    Sexual activity: Not Currently     Partners: Female   Lifestyle    Physical activity:     Days per week: Not on file     Minutes per session: Not on file    Stress: Not on file   Relationships    Social connections:     Talks on phone: Not on file     Gets together: Not on file     Attends [employer] service: Not on file     Active member of club or organization: Not on file     Attends meetings of clubs or organizations: Not on file     Relationship status: Not on file   Other Topics Concern    Not on file   Social History Narrative    Not on file       Current Outpatient Medications:     allopurinol (ZYLOPRIM) 300 MG tablet, Take 1 tablet (300 mg total) by mouth once daily., Disp: 30 tablet, Rfl: 3    aspirin (ECOTRIN) 81 MG EC tablet, Take 81 mg by mouth once daily., Disp: , Rfl:     aspirin 325 MG tablet, Take 325 mg by mouth once daily. , Disp: , Rfl:     atropine 1% (ISOPTO ATROPINE) 1 % Drop, Place 1 drop into the left eye once daily., Disp: , Rfl:     bisacodyl  "(DULCOLAX) 5 mg EC tablet, Take 5 mg by mouth daily as needed for Constipation., Disp: , Rfl:     brimonidine 0.2% (ALPHAGAN) 0.2 % Drop, Place 1 drop into both eyes 3 (three) times daily., Disp: 10 mL, Rfl: 11    coenzyme Q10 100 mg capsule, Take 100 mg by mouth every morning., Disp: , Rfl:     dorzolamide-timolol 2-0.5% (COSOPT) 22.3-6.8 mg/mL ophthalmic solution, Place 1 drop into both eyes 3 (three) times daily., Disp: 1 Bottle, Rfl: 11    ezetimibe (ZETIA) 10 mg tablet, TAKE 1 TABLET(10 MG) BY MOUTH EVERY DAY, Disp: 90 tablet, Rfl: 0    fish oil-omega-3 fatty acids 300-1,000 mg capsule, Take 1 capsule by mouth 2 (two) times daily., Disp: 60 capsule, Rfl: 3    gabapentin (NEURONTIN) 100 MG capsule, Take 2 capsules (200 mg total) by mouth every evening., Disp: 60 capsule, Rfl: 1    insulin degludec-liraglutide (XULTOPHY 100/3.6) 100 unit-3.6 mg /mL (3 mL) InPn, Inject 34 Units into the skin once daily., Disp: 15 Syringe, Rfl: 1    MULTIVIT,THER IRON,CA,FA & MIN (MULTIVITAMIN) Tab, Take 1 tablet by mouth every morning. , Disp: , Rfl:     pen needle, diabetic 31 gauge x 1/4" Ndle, Use as directed, Disp: 100 each, Rfl: 11    prednisoLONE acetate (PRED FORTE) 1 % DrpS, Place 1 drop into the left eye every morning., Disp: 1 Bottle, Rfl: 0    torsemide (DEMADEX) 20 MG Tab, Take 4 tablets (80 mg total) by mouth 2 (two) times daily., Disp: 240 tablet, Rfl: 2    carvedilol (COREG) 3.125 MG tablet, Take 1 tablet (3.125 mg total) by mouth 2 (two) times daily., Disp: 60 tablet, Rfl: 11  No current facility-administered medications for this visit.     Facility-Administered Medications Ordered in Other Visits:     clindamycin 900 MG/50 ML D5W 900 mg/50 mL IVPB 900 mg, 900 mg, Intravenous, Q8H, Mitch Chan MD, 900 mg at 06/05/19 1407    lactated ringers infusion, , Intravenous, Continuous, Tam Reynolds MD    lidocaine (PF) 10 mg/ml (1%) injection 10 mg, 1 mL, Intradermal, Once, Tam Reynolds, " MD    REVIEW OF SYSTEMS:  General: No fevers or chills; ENT: No sore throat; Allergy and Immunology: no persistent infections; Hematological and Lymphatic: No history of bleeding or easy bruising; Endocrine: negative; Respiratory: no cough, shortness of breath, or wheezing; Cardiovascular: no chest pain or dyspnea on exertion; Gastrointestinal: no abdominal pain/back, change in bowel habits, or bloody stools; Genito-Urinary: no dysuria, trouble voiding, or hematuria; Musculoskeletal: negative; Neurological: no TIA or stroke symptoms; Psychiatric: no nervousness, anxiety or depression.    PHYSICAL EXAM:                General appearance:  Alert, well-appearing, and in no distress.  Oriented to person, place, and time                    Neurological: Normal speech, no focal findings noted; CN II - XII grossly intact. RLE with sensation to light touch, LLE with sensation to light touch.            Musculoskeletal: Digits/nail without cyanosis/clubbing.  Strength 5/5 BLE.                    Neck: Supple, no significant adenopathy                  Chest:  Clear to auscultation, no wheezes, rales or rhonchi, symmetric air entry. No use of accessory muscles               Cardiac: Normal rate and regular rhythm, S1 and S2 normal            Abdomen: Soft, nontender, nondistended, no masses or organomegaly, no hernia     No rebound tenderness noted; bowel sounds normal     No groin adenopathy      Extremities:   2+ R femoral pulse, 2+ L femoral pulse     doppler+ R popliteal pulse, doppler+ L popliteal pulse     doppler+ R PT pulse, doppler+ L PT pulse     doppler+ R DP pulse, doppler+ L DP pulse     1+ RLE edema, 2+ LLE edema    Skin: LLE with minimal brawny edema, improved/smaller foot wound with markedly improved granulation tissue, no odor, no purulence or erythema    LAB RESULTS:  No results found for: CBC  Lab Results   Component Value Date    LABPROT 11.2 05/31/2019    INR 1.1 05/31/2019     Lab Results   Component  Value Date     08/22/2019    K 3.6 08/22/2019     08/22/2019    CO2 28 08/22/2019    GLU 83 08/22/2019    BUN 39 (H) 08/22/2019    CREATININE 1.1 08/22/2019    CALCIUM 8.8 08/22/2019    ANIONGAP 8 08/22/2019    EGFRNONAA >60 08/22/2019     Lab Results   Component Value Date    WBC 5.13 08/22/2019    RBC 3.73 (L) 08/22/2019    HGB 9.9 (L) 08/22/2019    HCT 32.0 (L) 08/22/2019    MCV 86 08/22/2019    MCH 26.5 (L) 08/22/2019    MCHC 30.9 (L) 08/22/2019    RDW 19.1 (H) 08/22/2019     08/22/2019    MPV 8.7 (L) 08/22/2019    GRAN 3.6 08/22/2019    GRAN 69.8 08/22/2019    LYMPH 0.5 (L) 08/22/2019    LYMPH 10.1 (L) 08/22/2019    MONO 0.7 08/22/2019    MONO 13.1 08/22/2019    EOS 0.3 08/22/2019    BASO 0.05 08/22/2019    EOSINOPHIL 6.0 08/22/2019    BASOPHIL 1.0 08/22/2019    DIFFMETHOD Automated 08/22/2019     .  Lab Results   Component Value Date    HGBA1C 7.5 (H) 12/10/2019       IMAGING:  All pertinent imaging has been reviewed and interpreted independently.    BLE arterial US 1/2019: No focal stenosis    5/16/19: BLE with no HD significant stenosis, SIDRA 0.8/1.46, left ankle pressures 60 and 171    7/29/19: BLE arterial US with approx 50% R TPT stenosis, SIDRA 0.8; LLE showing no HD significant stenosis, SIDRA 0.66    11/4/19: SIDRA 0.4/0.4, DP vessel NC bilaterally, R toe waveform normal  Right lower extremity arterial ultrasound shows no hemodynamically significant stenosis.  Pressures indicate moderate arterial occlusive disease.  Left lower extremity arterial ultrasound shows no hemodynamically significant stenosis.  Pressures indicate moderate arterial occlusive disease.      IMP/PLAN:  59 y.o. male with   Patient Active Problem List   Diagnosis    Epiretinal membrane, both eyes    Nuclear sclerotic cataract of right eye    Pseudophakia, left eye    Ptosis, left eyelid    DM type 2 with diabetic peripheral neuropathy    HLD (hyperlipidemia)    Neovascular glaucoma of both eyes    Tophaceous gout     Essential hypertension    Coronary artery disease involving native coronary artery of native heart without angina pectoris    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 edema, with long-term current use of insulin    Neovascular glaucoma of left eye, severe stage    Statin myopathy    Neovascular glaucoma, right eye, mild stage    Left leg cellulitis    PVD (peripheral vascular disease)    Right wrist pain    Multiple open wounds of lower leg    Hepatosplenomegaly    Type 2 diabetes mellitus with left diabetic foot ulcer    Open wound of left foot    Cellulitis of left lower extremity    Diabetic foot infection    Other ascites    Severe malnutrition    Goals of care, counseling/discussion    Alteration in skin integrity    MDR Acinetobacter baumannii infection    Takes dietary supplements    Intertriginous dermatitis associated with moisture    Debility    Infection due to multidrug-resistant Pseudomonas aeruginosa    Subacute osteomyelitis of left foot    Ascites    Acute on chronic combined systolic and diastolic congestive heart failure    Stage 3 chronic kidney disease    Atherosclerosis of left lower extremity with ulceration of midfoot    CKD stage 3 due to type 2 diabetes mellitus    Azotemia    Hyponatremia    Anemia due to multiple mechanisms    Persistent proteinuria    Hypokalemia    Open wound of foot, left, subsequent encounter    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    Ischemic cardiomyopathy    being managed by PCP and specialists who is here today for evaluation of L foot wound.    -Improving L foot wound improved s/p debridement 2/13/19, multifactorial due to diabetes, PVD and venous insufficiency with improvement in granulation tissue on Abx due to multidrug resistent bacteria in the past s/p debridements and L tibial angioplasty x 2 with improvement in wound +granulation tissue  s/p debridement 6/5/19 and WV placement; eval by Plastic Surgery re: grafting / skin grafting vs continuing wound care and allowing to heal by secondary intention - pt desires to cont wound care  -Cont ID rec Abx regimen  -Recommend continued wound care center care   -Rec BLE Xeroform and dry kerlix wraps twice daily to shin regions with elevation of LLE above level of the heart  -Low sodium diet  -Strict glycemic control  -RTC 3 mo for continued evaluation - wound may be fully healed at that time    I spent 20 minutes evaluating this patient and greater than 50% of the time was spent counseling, coordinator care and discussing the plan of care.  All questions were answered and patient stated understanding with agreement with the above treatment plan.    Mitch Chan MD VI  Vascular and Endovascular Surgery

## 2020-02-03 NOTE — MEDICAL/APP STUDENT
"Subjective:       Marvin Ray is a 59 y.o. male who presents today for wound check. Patient has a ulceration due to arterial/venous disease wound which is located on the left foot. Current symptoms: wound healing as expected. Symptoms began 2 years ago. Pain is rated 0/10. Interventions to date: dressing changed 1 day ago.     patient currently in wheelchair. The patient's symptoms are non-lifestyle limiting.    Objective:      /60 (BP Location: Left arm, Patient Position: Sitting, BP Method: Large (Manual))   Ht 5' 10" (1.778 m)   Wt 85.7 kg (188 lb 15 oz)   BMI 27.11 kg/m²     Wound:   wound margins intact and healing well.  No signs of infection.        Assessment:      Wound check. Cares provided were removal of existing dressing  application of clean dressing  application of sterile dressing     Plan:      1. Discussed appropriate home care of this wound., Wound redressed.  2. Patient instructions were given.  3. Follow up: 3 months.   "

## 2020-02-03 NOTE — LETTER
February 3, 2020        Rubén Davis MD  [address] [facility] [address]             [facility]  [address]  [facility] [address]  Phone: [phone]  Fax: [phone]   Patient: Marvin Ray   MR Number: [id number]   YOB: 1960   Date of Visit: 2/3/2020       Dear Dr. Davis:    Thank you for referring Marvin Ray to me for evaluation. Below are the relevant portions of my assessment and plan of care.            If you have questions, please do not hesitate to call me. I look forward to following Marvin along with you.    Sincerely,      Mitch Chan MD           CC  No Recipients

## 2020-02-07 ENCOUNTER — HOSPITAL ENCOUNTER (OUTPATIENT)
Facility: HOSPITAL | Age: 60
Discharge: HOME | End: 2020-02-07
Attending: RADIOLOGY | Admitting: RADIOLOGY
Payer: MEDICAID

## 2020-02-07 DIAGNOSIS — R18.8 OTHER ASCITES: ICD-10-CM

## 2020-02-10 DIAGNOSIS — R18.8 ASCITES: ICD-10-CM

## 2020-02-11 ENCOUNTER — HOSPITAL ENCOUNTER (OUTPATIENT)
Dept: WOUND CARE | Facility: HOSPITAL | Age: 60
Discharge: HOME | End: 2020-02-11
Attending: FAMILY MEDICINE
Payer: MEDICAID

## 2020-02-11 DIAGNOSIS — E11.621 TYPE 2 DIABETES MELLITUS WITH LEFT DIABETIC FOOT ULCER: Primary | ICD-10-CM

## 2020-02-11 DIAGNOSIS — L97.529 TYPE 2 DIABETES MELLITUS WITH LEFT DIABETIC FOOT ULCER: Primary | ICD-10-CM

## 2020-02-11 PROCEDURE — 99214 OFFICE O/P EST MOD 30 MIN: CPT | Mod: 25,,, | Performed by: FAMILY MEDICINE

## 2020-02-11 PROCEDURE — 11042 DBRDMT SUBQ TIS 1ST 20SQCM/<: CPT | Mod: ,,, | Performed by: FAMILY MEDICINE

## 2020-02-11 PROCEDURE — 27201912 HC WOUND CARE DEBRIDEMENT SUPPLIES: Performed by: FAMILY MEDICINE

## 2020-02-11 PROCEDURE — 11042 PR DEBRIDEMENT, SKIN, SUB-Q TISSUE,=<20 SQ CM: ICD-10-PCS | Mod: ,,, | Performed by: FAMILY MEDICINE

## 2020-02-11 PROCEDURE — 11042 DBRDMT SUBQ TIS 1ST 20SQCM/<: CPT | Performed by: FAMILY MEDICINE

## 2020-02-11 PROCEDURE — 99214 PR OFFICE/OUTPT VISIT, EST, LEVL IV, 30-39 MIN: ICD-10-PCS | Mod: 25,,, | Performed by: FAMILY MEDICINE

## 2020-02-19 ENCOUNTER — HOSPITAL ENCOUNTER (OUTPATIENT)
Facility: HOSPITAL | Age: 60
Discharge: HOME | End: 2020-02-19
Attending: RADIOLOGY | Admitting: RADIOLOGY
Payer: MEDICAID

## 2020-02-19 ENCOUNTER — HOSPITAL ENCOUNTER (OUTPATIENT)
Dept: INTERVENTIONAL RADIOLOGY/VASCULAR | Facility: HOSPITAL | Age: 60
Discharge: HOME | End: 2020-02-19
Attending: FAMILY MEDICINE
Payer: MEDICAID

## 2020-02-19 VITALS — SYSTOLIC BLOOD PRESSURE: 110 MMHG | DIASTOLIC BLOOD PRESSURE: 63 MMHG

## 2020-02-19 VITALS
TEMPERATURE: 98 F | RESPIRATION RATE: 20 BRPM | HEART RATE: 75 BPM | DIASTOLIC BLOOD PRESSURE: 71 MMHG | OXYGEN SATURATION: 95 % | SYSTOLIC BLOOD PRESSURE: 122 MMHG

## 2020-02-19 DIAGNOSIS — R18.8 OTHER ASCITES: Primary | ICD-10-CM

## 2020-02-19 DIAGNOSIS — R18.8 OTHER ASCITES: ICD-10-CM

## 2020-02-19 PROCEDURE — 49083 ABD PARACENTESIS W/IMAGING: CPT

## 2020-02-19 PROCEDURE — 49083 IR PARACENTESIS WITH IMAGING: ICD-10-PCS | Mod: ,,, | Performed by: RADIOLOGY

## 2020-02-19 PROCEDURE — 49083 ABD PARACENTESIS W/IMAGING: CPT | Mod: ,,, | Performed by: RADIOLOGY

## 2020-02-19 PROCEDURE — P9047 ALBUMIN (HUMAN), 25%, 50ML: HCPCS | Mod: JG | Performed by: RADIOLOGY

## 2020-02-19 PROCEDURE — 63600175 PHARM REV CODE 636 W HCPCS: Mod: JG | Performed by: RADIOLOGY

## 2020-02-19 PROCEDURE — A7048 VACUUM DRAIN BOTTLE/TUBE KIT: HCPCS

## 2020-02-19 RX ORDER — ALBUMIN HUMAN 250 G/1000ML
37.5 SOLUTION INTRAVENOUS ONCE AS NEEDED
Status: COMPLETED | OUTPATIENT
Start: 2020-02-19 | End: 2020-02-19

## 2020-02-19 RX ORDER — ALBUMIN HUMAN 250 G/1000ML
37.5 SOLUTION INTRAVENOUS ONCE AS NEEDED
Status: CANCELLED | OUTPATIENT
Start: 2020-02-19 | End: 2031-07-18

## 2020-02-19 RX ADMIN — ALBUMIN (HUMAN) 37.5 G: 12.5 SOLUTION INTRAVENOUS at 09:02

## 2020-02-19 NOTE — BRIEF OP NOTE
Radiology Post-Procedure Note     Pre Op Diagnosis: Recurrent painful, tense ascites  Post Op Diagnosis: Same     Procedure: U/S-guided therapeutic LVP     Procedure performed by: Zach Cah MD     Written Informed Consent Obtained: Yes  Specimen Removed: YES, 6850-L of straw-colored ascitic fluid  Estimated Blood Loss: none     Findings:   1. Successful therapeutic large-volume paracentesis with local anesthetic only and albumin infusion post PRN as indicated per institutional protocol. Patient tolerated the procedure well. No immediate post-procedural complications noted.       Thank you for considering IR for the care of your patient.      Zach Cha MD  Interventional Radiology

## 2020-02-19 NOTE — PROGRESS NOTES
Report called to JILLIAN Corea in [facility]. Paracentesis completed. 6850 ml removed from right lateral abdomen. Site dressed with bandaid, CDI, no redness, swelling or hematoma noted. Tolerated well. EDDI LANGSTON. Transported to [facility] per RN.

## 2020-02-19 NOTE — H&P
Radiology History & Physical      SUBJECTIVE:     Chief Complaint: Recurrent painful, tense ascites     History of Present Illness:  Marvin Ray is a 58 y.o. male with PMHx of CKD III and combined systolic/diastolic CHF complicated by recurrent abdominal distension and pain 2/2 recurrent painful, tense ascites without TTP on PE requiring frequent  therapeutic LVP.      A new outpatient IR consult placed for US-guided therapeutic paracentesis.      Past Medical History:   Diagnosis Date    Arthritis     Cellulitis     CKD (chronic kidney disease), stage III     Coronary artery disease     Diabetes mellitus     Diabetic retinopathy     Diabetic ulcer of left foot     Glaucoma     Gout     Hyperlipemia     Hypertension     ICD (implantable cardioverter-defibrillator) in place 11/02/2018    Left chest    Non-pressure chronic ulcer of other part of left foot with fat layer exposed 10/23/2018    PVD (peripheral vascular disease)     Type 2 diabetes mellitus with left diabetic foot ulcer 10/29/2018    Unsteady gait     uses a wheelchair     Past Surgical History:   Procedure Laterality Date    AHMED GLAUCOMA IMPLANT Left 2011    DONE AT [facility]    AORTOGRAPHY WITH SERIALOGRAPHY Left 4/30/2019    Procedure: AORTOGRAM, WITH SERIALOGRAPHY, LEFT LOWER EXTREMITY, POSSIBLE INTERVENTION;  Surgeon: Mitch Chan MD;  Location: [facility];  Service: Vascular;  Laterality: Left;  RN PRE OP 4-23-19.  NO H & P, No Cosent  [place]    BAERVELDT GLAUCOMA IMPLANT Left 2012    WITH CATARACT EXTRACTION//DONE AT [facility]    CARDIAC CATHETERIZATION Left 05/2016    CARDIAC DEFIBRILLATOR PLACEMENT Left 11/02/2018    CATARACT EXTRACTION W/  INTRAOCULAR LENS IMPLANT Left 2012    WITH BAERVEDT//DONE AT [facility]    CATARACT EXTRACTION W/  INTRAOCULAR LENS IMPLANT Right 09/26/2018    COMPLEX ()    CB DESTRUCTION WITH CYCLO G6 Left 02/15/2017        CYST REMOVAL      DEBRIDEMENT OF FOOT   12/28/2018    Procedure: DEBRIDEMENT, FOOT;  Surgeon: Mitch Wall MD;  Location: [facility] OR;  Service: Vascular;;    DEBRIDEMENT OF FOOT  6/5/2019    Procedure: DEBRIDEMENT, FOOT;  Surgeon: Mitch Wall MD;  Location: [facility] OR;  Service: Vascular;;    EXAMINATION UNDER ANESTHESIA Left 12/5/2018    Procedure: Exam under anesthesia, left foot debridement, washout and all other indicated procedures;  Surgeon: Mitch Wall MD;  Location: [facility] OR;  Service: Vascular;  Laterality: Left;  1030AM START  RN PREOP 12/3/2018-----AICD  SEEN BY DR JAMES 12/4    EXAMINATION UNDER ANESTHESIA Left 2/13/2019    Procedure: LEFT FOOT WOUND DEBRIDEMENT, WASHOUT AND ALL OTHER INDICATED PROCEDURES;  Surgeon: Mitch Wall MD;  Location: [facility] OR;  Service: Vascular;  Laterality: Left;  requesting 1st case start  THIS CASE 1ST PER DR WALL ON 2/1/19 @ 844AM -LO  RN PREOP 2/6/2019  HAS CARDIAC CLEARANCE    EXAMINATION UNDER ANESTHESIA Left 12/28/2018    Procedure: Exam under anesthesia, left foot debridement, left foot washout, possible left second through fifth metatarsal resection;  Surgeon: Mitch Wall MD;  Location: [facility] OR;  Service: Vascular;  Laterality: Left;  1:00pm start per julissa @ 8:58am  RN  PHONE PRE OP 12-27-18--=Pt coming from [facility] on [address]  NEED CONSENT    EXAMINATION UNDER ANESTHESIA Left 6/5/2019    Procedure: LEFT FOOT DEBRIDEMENT, WASHOUT AND ALL OTHER INDICATED PROCEDURES;  Surgeon: Mitch Wall MD;  Location: [facility] OR;  Service: Vascular;  Laterality: Left;  RN PRE OP 5-31-19------ICD------NEED CONSENT    INCISION AND DRAINAGE Left 11/14/2018    Procedure: Incision and Drainage, left lower extremity debridement, washout;  Surgeon: Mitch Wall MD;  Location: [facility] OR;  Service: Vascular;  Laterality: Left;    INCISION AND DRAINAGE Left 11/16/2018    Procedure: INCISION AND DRAINAGE;  Surgeon: Mitch Wall MD;  Location: [facility] OR;  Service:  Vascular;  Laterality: Left;    INTRAOCULAR PROSTHESES INSERTION Right 9/26/2018    Procedure: INSERTION, IOL PROSTHESIS;  Surgeon: Perla Cortés MD;  Location: [facility] OR [address] [place] [address];  Service: Ophthalmology;  Laterality: Right;    PERITONEOCENTESIS N/A 3/1/2019    Procedure: PARACENTESIS, ABDOMINAL, INITIAL;  Surgeon: Dosc Diagnostic Provider;  Location: [facility] OR;  Service: Radiology;  Laterality: N/A;    PERITONEOCENTESIS N/A 3/25/2019    Procedure: PARACENTESIS, ABDOMINAL, INITIAL;  Surgeon: Dosc Diagnostic Provider;  [facility];  Service: Radiology;  Laterality: N/A;    PERITONEOCENTESIS N/A 7/22/2019    Procedure: PARACENTESIS, ABDOMINAL, INITIAL;  Surgeon: Dosc Diagnostic Provider;  Location: [facility] OR;  Service: Radiology;  Laterality: N/A;    PERITONEOCENTESIS N/A 8/16/2019    Procedure: PARACENTESIS, ABDOMINAL, INITIAL;  Surgeon: Dosc Diagnostic Provider;  Location: [facility] OR;  Service: Radiology;  Laterality: N/A;    PERITONEOCENTESIS N/A 9/26/2019    Procedure: PARACENTESIS, ABDOMINAL;  Surgeon: Dosc Diagnostic Provider;  Location: [facility] OR;  Service: Radiology;  Laterality: N/A;    PERITONEOCENTESIS N/A 10/11/2019    Procedure: PARACENTESIS, ABDOMINAL;  Surgeon: Dosc Diagnostic Provider;  Location: [facility] OR;  Service: Radiology;  Laterality: N/A;    PERITONEOCENTESIS N/A 10/31/2019    Procedure: PARACENTESIS, ABDOMINAL;  Surgeon: Dosc Diagnostic Provider;  Location: [facility] OR;  Service: Radiology;  Laterality: N/A;    PERITONEOCENTESIS N/A 11/18/2019    Procedure: PARACENTESIS, ABDOMINAL;  Surgeon: Dosc Diagnostic Provider;  [facility];  Service: Radiology;  Laterality: N/A;    PERITONEOCENTESIS N/A 12/16/2019    Procedure: PARACENTESIS, ABDOMINAL;  Surgeon: Dosc Diagnostic Provider;  [facility]: [facility];  Service: Radiology;  Laterality: N/A;  2PM START    PERITONEOCENTESIS N/A 2/7/2020    Procedure: PARACENTESIS, ABDOMINAL;  Surgeon: Dosc Diagnostic Provider;  Location: [facility] OR;   Service: Radiology;  Laterality: N/A;  REQUESTED 10:30A START    PHACOEMULSIFICATION OF CATARACT Right 9/26/2018    Procedure: PHACOEMULSIFICATION, CATARACT;  Surgeon: Perla Cortés MD;  Location: [address];  Service: Ophthalmology;  Laterality: Right;    REPEAT ABDOMINAL PARACENTESIS N/A 2/11/2019    Procedure: PARACENTESIS, ABDOMINAL, REPEAT;  Surgeon: [facility] Diagnostic Provider;  Location: [facility] OR;  Service: Radiology;  Laterality: N/A;  1PM START    REPEAT ABDOMINAL PARACENTESIS N/A 9/12/2019    Procedure: PARACENTESIS, ABDOMINAL, REPEAT;  Surgeon: Dos Diagnostic Provider;  Location: [facility] OR;  Service: Radiology;  Laterality: N/A;  9AM START    REPEAT ABDOMINAL PARACENTESIS N/A 12/2/2019    Procedure: PARACENTESIS, ABDOMINAL, REPEAT;  Surgeon: Dos Diagnostic Provider;  Location: [facility] OR;  Service: Radiology;  Laterality: N/A;  3PM START    REPEAT ABDOMINAL PARACENTESIS N/A 1/10/2020    Procedure: PARACENTESIS, ABDOMINAL, REPEAT;  Surgeon: Dos Diagnostic Provider;  Location: [facility] OR;  Service: Radiology;  Laterality: N/A;  1130AM START    REPEAT ABDOMINAL PARACENTESIS N/A 1/20/2020    Procedure: PARACENTESIS, ABDOMINAL, REPEAT;  Surgeon: Dos Diagnostic Provider;  Location: [facility] OR;  Service: Radiology;  Laterality: N/A;  1PM START    REPEAT ABDOMINAL PARACENTESIS N/A 1/31/2020    Procedure: PARACENTESIS, ABDOMINAL, REPEAT;  Surgeon: Dos Diagnostic Provider;  Location: [facility] OR;  Service: Radiology;  Laterality: N/A;  10AM START    REVISION OF PROCEDURE INVOLVING GLAUCOMA DRAINAGE DEVICE Left 10/2/2019    EXTRUDING BAERVELDT /WITH PERICARDIAL PATCH GRAFT ()    Right foot surgery  10/2014    TOE AMPUTATION Right     first and second    TOE AMPUTATION Left 11/16/2018    Procedure: AMPUTATION, TOES  2-5;  Surgeon: Mitch Chan MD;  Location: [facility] OR;  Service: Vascular;  Laterality: Left;    TOE AMPUTATION Left 12/5/2018    Procedure: AMPUTATION, TOE;  Surgeon: Mitch  "HERSON Chan MD;  Location: [facility] OR;  Service: Vascular;  Laterality: Left;  left great toe    TONSILLECTOMY         Home Meds:   Prior to Admission medications    Medication Sig Start Date End Date Taking? Authorizing Provider   allopurinol (ZYLOPRIM) 300 MG tablet Take 1 tablet (300 mg total) by mouth once daily. 9/27/19   Rubén Davis MD   aspirin (ECOTRIN) 81 MG EC tablet Take 81 mg by mouth once daily.    Historical Provider, MD   aspirin 325 MG tablet Take 325 mg by mouth once daily.     Historical Provider, MD   atropine 1% (ISOPTO ATROPINE) 1 % Drop Place 1 drop into the left eye once daily.    Perla Cortés MD   bisacodyl (DULCOLAX) 5 mg EC tablet Take 5 mg by mouth daily as needed for Constipation.    Historical Provider, MD   brimonidine 0.2% (ALPHAGAN) 0.2 % Drop Place 1 drop into both eyes 3 (three) times daily. 12/12/19   Perla Cortés MD   carvedilol (COREG) 3.125 MG tablet Take 1 tablet (3.125 mg total) by mouth 2 (two) times daily. 1/23/19 1/23/20  Sara Ching MD   coenzyme Q10 100 mg capsule Take 100 mg by mouth every morning.    Historical Provider, MD   dorzolamide-timolol 2-0.5% (COSOPT) 22.3-6.8 mg/mL ophthalmic solution Place 1 drop into both eyes 3 (three) times daily. 12/12/19   Perla Cortés MD   ezetimibe (ZETIA) 10 mg tablet TAKE 1 TABLET(10 MG) BY MOUTH EVERY DAY 1/24/20   Rubén Davis MD   fish oil-omega-3 fatty acids 300-1,000 mg capsule Take 1 capsule by mouth 2 (two) times daily. 1/23/19   Sara Ching MD   gabapentin (NEURONTIN) 100 MG capsule Take 2 capsules (200 mg total) by mouth every evening. 1/28/20 1/27/21  Rubén Davis MD   insulin degludec-liraglutide (XULTOPHY 100/3.6) 100 unit-3.6 mg /mL (3 mL) InPn Inject 34 Units into the skin once daily. 1/28/20   Sheryl Madison, SCOTT   MULTIVIT,THER IRON,CA,FA & MIN (MULTIVITAMIN) Tab Take 1 tablet by mouth every morning.     Historical Provider, MD   pen needle, diabetic 31 gauge x 1/4" Ndle Use " as directed 8/11/16   Mike Bass MD   prednisoLONE acetate (PRED FORTE) 1 % DrpS Place 1 drop into the left eye every morning. 10/10/19   Perla Cortés MD   torsemide (DEMADEX) 20 MG Tab Take 4 tablets (80 mg total) by mouth 2 (two) times daily. 1/28/20 2/27/20  Rubén Davis MD     Anticoagulants/Antiplatelets: aspirin    Allergies:   Review of patient's allergies indicates:   Allergen Reactions    Statins-hmg-coa reductase inhibitors      Generalized Pain    Onglyza [saxagliptin]     Penicillins Rash     Sedation History:  no adverse reactions     Review of Systems:   Hematological: no known coagulopathies  Respiratory: no cough, shortness of breath, or wheezing  Cardiovascular: no chest pain or dyspnea on exertion  Gastrointestinal: no abdominal pain, change in bowel habits, or black or bloody stools  Genito-Urinary: no dysuria, trouble voiding, or hematuria  Musculoskeletal: negative  Neurological: no TIA or stroke symptoms      OBJECTIVE:     Vital Signs (Most Recent)     Physical Exam:  General: no acute distress  Mental Status: alert and oriented to person, place and time  HEENT: normocephalic, atraumatic  Chest: mild labored breathing  Heart: regular heart rate  Abdomen: +distended. No TTP.  Extremity: moves all extremities    Laboratory  Lab Results   Component Value Date    INR 1.1 05/31/2019       Lab Results   Component Value Date    WBC 5.13 08/22/2019    HGB 9.9 (L) 08/22/2019    HCT 32.0 (L) 08/22/2019    MCV 86 08/22/2019     08/22/2019      Lab Results   Component Value Date    GLU 83 08/22/2019     08/22/2019    K 3.6 08/22/2019     08/22/2019    CO2 28 08/22/2019    BUN 39 (H) 08/22/2019    CREATININE 1.1 08/22/2019    CALCIUM 8.8 08/22/2019    MG 2.1 05/31/2019    ALT 12 08/22/2019    AST 14 08/22/2019    ALBUMIN 2.8 (L) 08/22/2019    BILITOT 0.5 08/22/2019       ASSESSMENT/PLAN:     57 y/o M with CKD III and combined systolic/diastolic CHF complicated by  recurrent abdominal distension and pain 2/2 recurrent painful, tense ascites requiring frequent large-volume therapeutic paracentesis.      1. Will attempt U/S-guided therapeutic paracentesis with local anesthetic only and albumin infusion post PRN per institutional protocol.      Risks (including, but not limited to, pain, bleeding, infection, damage to nearby structures, failure to obtain sufficient material for a diagnosis, the need for additional procedures, and death), benefits, and alternatives were discussed with the patient. All questions were answered to the best of my abilities. The patient wishes to proceed with the procedure. Written informed consent was obtained.     Thank you for considering IR for the care of your patient.      Zach Cha MD  Interventional Radiology

## 2020-02-19 NOTE — DISCHARGE SUMMARY
Radiology Discharge Summary      Hospital Course: No complications    Admit Date: 2/19/2020  Discharge Date: 02/19/2020     Instructions Given to Patient: Yes    Diet: Resume prior diet     Activity: activity as tolerated    Description of Condition on Discharge: Stable    Vital Signs (Most Recent): BP: 110/63 (02/19/20 0855)    Discharge Disposition: Home    Discharge Diagnosis:   60 y/o M with h/o recurrent painful, tense ascites s/p successful U/S-guided therapeutic LVP with local anesthetic only and albumin infusion post PRN as indicated per institutional protocol. Patient tolerated the procedure well. No immediate post-procedural complications noted.      Thank you for considering IR for the care of your patient.      Zach Cha MD  Interventional Radiology

## 2020-02-19 NOTE — DISCHARGE INSTRUCTIONS
BATHING:  ? You may shower tomorrow.  DRESSING:  ? Remove dressing tomorrow.        ACTIVITY LEVEL: If you have received sedation or an anesthetic, you may feel sleepy for several hours. Rest until you are more awake. Gradually resume your normal activities    Do not drive, drink alcohol, or sign legal documents for 24 hours, or if taking narcotic pain medication.      DIET: You may resume your home diet. If nausea is present, increase your diet gradually with fluids and bland foods.    Medications: Pain medication should be taken only if needed and as directed. If antibiotics are prescribed, the medication should be taken until completed. You will be given an updated list of you medications.  ? No driving, alcoholic beverages or signing legal documents for next 24 hours if you have had sedation, or while taking pain medication    CALL THE DOCTOR:   For any obvious bleeding (some dried blood over the incision is normal).     Redness, swelling, foul smell around incision or fever over 101.  Shortness of breath.  Persistent pain or nausea not relieved by medication.  Call  [phone]     to speak with an Interventional Radiologist    If any unusual problems or difficulties occur contact your doctor. If you cannot contact your doctor but feel your signs and symptoms warrant a physicians attention return to the emergency room.            Discharge Instructions for Paracentesis  Paracentesis is a procedure to remove extra fluid from your belly (abdomen). This fluid buildup in the abdomen is called ascites. The procedure may have been done to take a sample of the fluid. Or, it may have been done to drain the extra fluid from your abdomen and help make you more comfortable.     Ascites is buildup of excess fluid in the abdomen.   Home care  · If you have pain after the procedure, your healthcare provider can prescribe or recommend pain medicines. Take these exactly as directed. If you stopped taking other medicines before  the procedure, ask your provider when you can start them again.  · Take it easy for 24 hours after the procedure. Avoid physical activity until your provider says its OK.  · You will have a small bandage over the puncture site. Stitches (sutures), surgical staples, adhesive tapes, adhesive strips, or surgical glue may be used to close the incision. They also help stop bleeding and speed healing. You may take the bandage off in 24 hours.  · Check the puncture site for the signs of infection listed below.  Follow-up care  Make a follow-up appointment with your healthcare provider as directed. During your follow-up visit, your provider will check your healing. Let your provider know how you are feeling. You can also discuss the cause of your ascites and if you need any further treatment.  When to seek medical advice  Call your healthcare provider if you have any of the following after the procedure:  · A fever of 100.4°F (38.0°C) or higher  · Trouble breathing  · Pain that doesn't go away even after taking pain medicine  · Belly pain not caused by having the skin punctured  · Bleeding from the puncture site  · More than a small amount of fluid leaking from the puncture site  · Swollen belly  · Signs of infection at the puncture site. These include increased pain, redness, or swelling, warmth, or bad-smelling drainage.  · Blood in your urine  · Feeling dizzy or lightheaded, or fainting   Date Last Reviewed: 7/1/2016  © 6136-0416 [employer]. [address] [place] [address]. All rights reserved. This information is not intended as a substitute for professional medical care. Always follow your healthcare professional's instructions.      Fall Prevention  Millions of people fall every year and injure themselves. You may have had anesthesia or sedation which may increase your risk of falling. You may have health issues that put you at an increased risk of falling.     Here are ways to reduce your  risk of falling.  ·   · Make your home safe by keeping walkways clear of objects you may trip over.  · Use non-slip pads under rugs. Do not use area rugs or small throw rugs.  · Use non-slip mats in bathtubs and showers.  · Install handrails and lights on staircases.  · Do not walk in poorly lit areas.  · Do not stand on chairs or wobbly ladders.  · Use caution when reaching overhead or looking upward. This position can cause a loss of balance.  · Be sure your shoes fit properly, have non-slip bottoms and are in good condition.   · Wear shoes both inside and out. Avoid going barefoot or wearing slippers.  · Be cautious when going up and down stairs, curbs, and when walking on uneven sidewalks.  · If your balance is poor, consider using a cane or walker.  · If your fall was related to alcohol use, stop or limit alcohol intake.   · If your fall was related to use of sleeping medicines, talk to your doctor about this. You may need to reduce your dosage at bedtime if you awaken during the night to go to the bathroom.    · To reduce the need for nighttime bathroom trips:  ¨ Avoid drinking fluids for several hours before going to bed  ¨ Empty your bladder before going to bed  ¨ Men can keep a urinal at the bedside  · Stay as active as you can. Balance, flexibility, strength, and endurance all come from exercise. They all play a role in preventing falls. Ask your healthcare provider which types of activity are right for you.  · Get your vision checked on a regular basis.  · If you have pets, know where they are before you stand up or walk so you don't trip over them.  · Use night lights.

## 2020-02-23 RX ORDER — ALLOPURINOL 300 MG/1
TABLET ORAL
Qty: 30 TABLET | Refills: 3 | Status: SHIPPED | OUTPATIENT
Start: 2020-02-23 | End: 2020-06-15

## 2020-02-26 DIAGNOSIS — R18.8 OTHER ASCITES: Primary | ICD-10-CM

## 2020-02-26 DIAGNOSIS — I50.43 ACUTE ON CHRONIC COMBINED SYSTOLIC AND DIASTOLIC CONGESTIVE HEART FAILURE: ICD-10-CM

## 2020-02-26 RX ORDER — TORSEMIDE 20 MG/1
80 TABLET ORAL 2 TIMES DAILY
Qty: 240 TABLET | Refills: 2 | Status: SHIPPED | OUTPATIENT
Start: 2020-02-26 | End: 2020-06-03 | Stop reason: SDUPTHER

## 2020-02-26 RX ORDER — CARVEDILOL 3.12 MG/1
3.12 TABLET ORAL 2 TIMES DAILY
Qty: 60 TABLET | Refills: 1 | Status: SHIPPED | OUTPATIENT
Start: 2020-02-26 | End: 2020-04-24

## 2020-02-26 NOTE — TELEPHONE ENCOUNTER
----- Message from Soumya Bradley sent at 2/26/2020  8:26 AM CST -----  Contact: pt   Pt needing a call back regarding new prescription medication carvedilol (COREG) 3.125 MG tablet sent to [facility]. Pt needing a updated orders        Pt contact # [phone]

## 2020-02-26 NOTE — TELEPHONE ENCOUNTER
Last Office Visit Info:   The patient's last visit with Rubén Davis MD was on 3/20/2019.    The patient's last visit in current department was on Visit date not found.        Last CBC Results:   Lab Results   Component Value Date    WBC 5.13 08/22/2019    HGB 9.9 (L) 08/22/2019    HCT 32.0 (L) 08/22/2019     08/22/2019       Last CMP Results  Lab Results   Component Value Date     08/22/2019    K 3.6 08/22/2019     08/22/2019    CO2 28 08/22/2019    BUN 39 (H) 08/22/2019    CREATININE 1.1 08/22/2019    CALCIUM 8.8 08/22/2019    ALBUMIN 2.8 (L) 08/22/2019    AST 14 08/22/2019    ALT 12 08/22/2019       Last Lipids  Lab Results   Component Value Date    CHOL 107 (L) 10/30/2018    TRIG 44 10/30/2018    HDL 27 (L) 10/30/2018    LDLCALC 71.2 10/30/2018       Last A1C  Lab Results   Component Value Date    HGBA1C 7.5 (H) 12/10/2019       Last TSH  No results found for: TSH      Current Med Refills  Medication List with Changes/Refills   Current Medications    ALLOPURINOL (ZYLOPRIM) 300 MG TABLET    TAKE 1 TABLET(300 MG) BY MOUTH EVERY DAY       Start Date: 2/23/2020 End Date: --    ASPIRIN (ECOTRIN) 81 MG EC TABLET    Take 81 mg by mouth once daily.       Start Date: --        End Date: --    ASPIRIN 325 MG TABLET    Take 325 mg by mouth once daily.        Start Date: --        End Date: --    ATROPINE 1% (ISOPTO ATROPINE) 1 % DROP    Place 1 drop into the left eye once daily.       Start Date: --        End Date: --    BISACODYL (DULCOLAX) 5 MG EC TABLET    Take 5 mg by mouth daily as needed for Constipation.       Start Date: --        End Date: --    BRIMONIDINE 0.2% (ALPHAGAN) 0.2 % DROP    Place 1 drop into both eyes 3 (three) times daily.       Start Date: 12/12/2019End Date: --    CARVEDILOL (COREG) 3.125 MG TABLET    Take 1 tablet (3.125 mg total) by mouth 2 (two) times daily.       Start Date: 2/26/2020 End Date: 2/25/2021    COENZYME Q10 100 MG CAPSULE    Take 100 mg by mouth every  "morning.       Start Date: --        End Date: --    DORZOLAMIDE-TIMOLOL 2-0.5% (COSOPT) 22.3-6.8 MG/ML OPHTHALMIC SOLUTION    Place 1 drop into both eyes 3 (three) times daily.       Start Date: 12/12/2019End Date: --    EZETIMIBE (ZETIA) 10 MG TABLET    TAKE 1 TABLET(10 MG) BY MOUTH EVERY DAY       Start Date: 1/24/2020 End Date: --    FISH OIL-OMEGA-3 FATTY ACIDS 300-1,000 MG CAPSULE    Take 1 capsule by mouth 2 (two) times daily.       Start Date: 1/23/2019 End Date: --    GABAPENTIN (NEURONTIN) 100 MG CAPSULE    Take 2 capsules (200 mg total) by mouth every evening.       Start Date: 1/28/2020 End Date: 1/27/2021    INSULIN DEGLUDEC-LIRAGLUTIDE (XULTOPHY 100/3.6) 100 UNIT-3.6 MG /ML (3 ML) INPN    Inject 34 Units into the skin once daily.       Start Date: 1/28/2020 End Date: --    MULTIVIT,THER IRON,CA,FA & MIN (MULTIVITAMIN) TAB    Take 1 tablet by mouth every morning.        Start Date: --        End Date: --    PEN NEEDLE, DIABETIC 31 GAUGE X 1/4" NDLE    Use as directed       Start Date: 8/11/2016 End Date: --    PREDNISOLONE ACETATE (PRED FORTE) 1 % DRPS    Place 1 drop into the left eye every morning.       Start Date: 10/10/2019End Date: --    TORSEMIDE (DEMADEX) 20 MG TAB    Take 4 tablets (80 mg total) by mouth 2 (two) times daily.       Start Date: 1/28/2020 End Date: 2/27/2020       Order(s) placed per written order guidelines:     Please advise.              "

## 2020-02-28 ENCOUNTER — HOSPITAL ENCOUNTER (OUTPATIENT)
Dept: INTERVENTIONAL RADIOLOGY/VASCULAR | Facility: HOSPITAL | Age: 60
Discharge: HOME | End: 2020-02-28
Attending: FAMILY MEDICINE
Payer: MEDICAID

## 2020-02-28 ENCOUNTER — HOSPITAL ENCOUNTER (OUTPATIENT)
Facility: HOSPITAL | Age: 60
Discharge: HOME | End: 2020-02-28
Attending: RADIOLOGY | Admitting: RADIOLOGY
Payer: MEDICAID

## 2020-02-28 VITALS
DIASTOLIC BLOOD PRESSURE: 63 MMHG | HEART RATE: 67 BPM | OXYGEN SATURATION: 100 % | RESPIRATION RATE: 20 BRPM | SYSTOLIC BLOOD PRESSURE: 132 MMHG | TEMPERATURE: 97 F

## 2020-02-28 DIAGNOSIS — R18.8 OTHER ASCITES: ICD-10-CM

## 2020-02-28 PROCEDURE — 49083 ABD PARACENTESIS W/IMAGING: CPT | Mod: ,,, | Performed by: RADIOLOGY

## 2020-02-28 PROCEDURE — P9047 ALBUMIN (HUMAN), 25%, 50ML: HCPCS | Mod: JG | Performed by: RADIOLOGY

## 2020-02-28 PROCEDURE — 63600175 PHARM REV CODE 636 W HCPCS: Mod: JG | Performed by: RADIOLOGY

## 2020-02-28 PROCEDURE — A7048 VACUUM DRAIN BOTTLE/TUBE KIT: HCPCS

## 2020-02-28 PROCEDURE — 49083 IR PARACENTESIS WITH IMAGING: ICD-10-PCS | Mod: ,,, | Performed by: RADIOLOGY

## 2020-02-28 PROCEDURE — 49083 ABD PARACENTESIS W/IMAGING: CPT

## 2020-02-28 RX ORDER — ALBUMIN HUMAN 250 G/1000ML
50 SOLUTION INTRAVENOUS ONCE AS NEEDED
Status: CANCELLED | OUTPATIENT
Start: 2020-02-28 | End: 2031-07-27

## 2020-02-28 RX ORDER — ALBUMIN HUMAN 250 G/1000ML
50 SOLUTION INTRAVENOUS ONCE AS NEEDED
Status: COMPLETED | OUTPATIENT
Start: 2020-02-28 | End: 2020-02-28

## 2020-02-28 RX ADMIN — ALBUMIN (HUMAN) 50 G: 12.5 SOLUTION INTRAVENOUS at 12:02

## 2020-02-28 NOTE — DISCHARGE SUMMARY
Radiology Discharge Summary      Hospital Course: No complications    Admit Date: 2/28/2020  Discharge Date: 02/28/2020     Instructions Given to Patient: Yes    Diet: Resume prior diet     Activity: activity as tolerated    Description of Condition on Discharge: Stable    Vital Signs (Most Recent):      Discharge Disposition: Home    Discharge Diagnosis:   60 y/o M with h/o recurrent painful, tense ascites s/p successful U/S-guided therapeutic LVP with local anesthetic only and albumin infusion post PRN as indicated per institutional protocol. Patient tolerated the procedure well. No immediate post-procedural complications noted.      Thank you for considering IR for the care of your patient.      Zach Cha MD  Interventional Radiology

## 2020-02-28 NOTE — DISCHARGE INSTRUCTIONS
BATHING:  ? You may shower tomorrow.  DRESSING:  ? Remove dressing tomorrow.        ACTIVITY LEVEL: If you have received sedation or an anesthetic, you may feel sleepy for several hours. Rest until you are more awake. Gradually resume your normal activities    Do not drive, drink alcohol, or sign legal documents for 24 hours, or if taking narcotic pain medication.      DIET: You may resume your home diet. If nausea is present, increase your diet gradually with fluids and bland foods.    Medications: Pain medication should be taken only if needed and as directed. If antibiotics are prescribed, the medication should be taken until completed. You will be given an updated list of you medications.  ? No driving, alcoholic beverages or signing legal documents for next 24 hours if you have had sedation, or while taking pain medication    CALL THE DOCTOR:   For any obvious bleeding (some dried blood over the incision is normal).     Redness, swelling, foul smell around incision or fever over 101.  Shortness of breath.  Persistent pain or nausea not relieved by medication.  Call  [phone]     to speak with an Interventional Radiologist    If any unusual problems or difficulties occur contact your doctor. If you cannot contact your doctor but feel your signs and symptoms warrant a physicians attention return to the emergency room.       Fall Prevention  Millions of people fall every year and injure themselves. You may have had anesthesia or sedation which may increase your risk of falling. You may have health issues that put you at an increased risk of falling.     Here are ways to reduce your risk of falling.  ·   · Make your home safe by keeping walkways clear of objects you may trip over.  · Use non-slip pads under rugs. Do not use area rugs or small throw rugs.  · Use non-slip mats in bathtubs and showers.  · Install handrails and lights on staircases.  · Do not walk in poorly lit areas.  · Do not stand on chairs or  wobbly ladders.  · Use caution when reaching overhead or looking upward. This position can cause a loss of balance.  · Be sure your shoes fit properly, have non-slip bottoms and are in good condition.   · Wear shoes both inside and out. Avoid going barefoot or wearing slippers.  · Be cautious when going up and down stairs, curbs, and when walking on uneven sidewalks.  · If your balance is poor, consider using a cane or walker.  · If your fall was related to alcohol use, stop or limit alcohol intake.   · If your fall was related to use of sleeping medicines, talk to your doctor about this. You may need to reduce your dosage at bedtime if you awaken during the night to go to the bathroom.    · To reduce the need for nighttime bathroom trips:  ¨ Avoid drinking fluids for several hours before going to bed  ¨ Empty your bladder before going to bed  ¨ Men can keep a urinal at the bedside  · Stay as active as you can. Balance, flexibility, strength, and endurance all come from exercise. They all play a role in preventing falls. Ask your healthcare provider which types of activity are right for you.  · Get your vision checked on a regular basis.  · If you have pets, know where they are before you stand up or walk so you don't trip over them.  · Use night lights.

## 2020-02-28 NOTE — H&P
Radiology History & Physical      SUBJECTIVE:     Chief Complaint: Recurrent painful, tense ascites     History of Present Illness:  Marvin Ray is a 58 y.o. male with PMHx of CKD III and combined systolic/diastolic CHF complicated by recurrent abdominal distension and pain 2/2 recurrent painful, tense ascites without TTP on PE to suggest SBP requiring frequent  therapeutic LVP.      A new outpatient IR consult placed for US-guided therapeutic paracentesis.      Past Medical History:   Diagnosis Date    Arthritis     Cellulitis     CKD (chronic kidney disease), stage III     Coronary artery disease     Diabetes mellitus     Diabetic retinopathy     Diabetic ulcer of left foot     Glaucoma     Gout     Hyperlipemia     Hypertension     ICD (implantable cardioverter-defibrillator) in place 11/02/2018    Left chest    Non-pressure chronic ulcer of other part of left foot with fat layer exposed 10/23/2018    PVD (peripheral vascular disease)     Type 2 diabetes mellitus with left diabetic foot ulcer 10/29/2018    Unsteady gait     uses a wheelchair     Past Surgical History:   Procedure Laterality Date    AHMED GLAUCOMA IMPLANT Left 2011    DONE AT [facility]    AORTOGRAPHY WITH SERIALOGRAPHY Left 4/30/2019    Procedure: AORTOGRAM, WITH SERIALOGRAPHY, LEFT LOWER EXTREMITY, POSSIBLE INTERVENTION;  Surgeon: Mitch Chan MD;  Location: [facility];  Service: Vascular;  Laterality: Left;  RN PRE OP 4-23-19.  NO H & P, No Cosent  [place]    BAERVELDT GLAUCOMA IMPLANT Left 2012    WITH CATARACT EXTRACTION//DONE AT [facility]    CARDIAC CATHETERIZATION Left 05/2016    CARDIAC DEFIBRILLATOR PLACEMENT Left 11/02/2018    CATARACT EXTRACTION W/  INTRAOCULAR LENS IMPLANT Left 2012    WITH BAERVEDT//DONE AT [facility]    CATARACT EXTRACTION W/  INTRAOCULAR LENS IMPLANT Right 09/26/2018    COMPLEX ()    CB DESTRUCTION WITH CYCLO G6 Left 02/15/2017        CYST REMOVAL      DEBRIDEMENT OF  FOOT  12/28/2018    Procedure: DEBRIDEMENT, FOOT;  Surgeon: Mitch Wall MD;  Location: [facility] OR;  Service: Vascular;;    DEBRIDEMENT OF FOOT  6/5/2019    Procedure: DEBRIDEMENT, FOOT;  Surgeon: Mitch Wall MD;  Location: [facility] OR;  Service: Vascular;;    EXAMINATION UNDER ANESTHESIA Left 12/5/2018    Procedure: Exam under anesthesia, left foot debridement, washout and all other indicated procedures;  Surgeon: Mitch Wall MD;  Location: [facility] OR;  Service: Vascular;  Laterality: Left;  1030AM START  RN PREOP 12/3/2018-----AICD  SEEN BY DR JAMES 12/4    EXAMINATION UNDER ANESTHESIA Left 2/13/2019    Procedure: LEFT FOOT WOUND DEBRIDEMENT, WASHOUT AND ALL OTHER INDICATED PROCEDURES;  Surgeon: Mitch Wall MD;  Location: [facility] OR;  Service: Vascular;  Laterality: Left;  requesting 1st case start  THIS CASE 1ST PER DR WALL ON 2/1/19 @ 844AM -[place]  RN PREOP 2/6/2019  HAS CARDIAC CLEARANCE    EXAMINATION UNDER ANESTHESIA Left 12/28/2018    Procedure: Exam under anesthesia, left foot debridement, left foot washout, possible left second through fifth metatarsal resection;  Surgeon: Mitch Wall MD;  Location: [facility] OR;  Service: Vascular;  Laterality: Left;  1:00pm start per julissa @ 8:58am  RN  PHONE PRE OP 12-27-18--=Pt coming from [facility] on [address]  NEED CONSENT    EXAMINATION UNDER ANESTHESIA Left 6/5/2019    Procedure: LEFT FOOT DEBRIDEMENT, WASHOUT AND ALL OTHER INDICATED PROCEDURES;  Surgeon: Mitch Wall MD;  Location: [facility] OR;  Service: Vascular;  Laterality: Left;  RN PRE OP 5-31-19------ICD------NEED CONSENT    INCISION AND DRAINAGE Left 11/14/2018    Procedure: Incision and Drainage, left lower extremity debridement, washout;  Surgeon: Mitch Wall MD;  Location: [facility] OR;  Service: Vascular;  Laterality: Left;    INCISION AND DRAINAGE Left 11/16/2018    Procedure: INCISION AND DRAINAGE;  Surgeon: Mitch Wall MD;  Location: [facility] OR;   Service: Vascular;  Laterality: Left;    INTRAOCULAR PROSTHESES INSERTION Right 9/26/2018    Procedure: INSERTION, IOL PROSTHESIS;  Surgeon: Perla Cortés MD;  Location: [address];  Service: Ophthalmology;  Laterality: Right;    PERITONEOCENTESIS N/A 3/1/2019    Procedure: PARACENTESIS, ABDOMINAL, INITIAL;  Surgeon: Dosc Diagnostic Provider;  Location: [facility] OR;  Service: Radiology;  Laterality: N/A;    PERITONEOCENTESIS N/A 3/25/2019    Procedure: PARACENTESIS, ABDOMINAL, INITIAL;  Surgeon: Dosc Diagnostic Provider;  Location: [facility] OR;  Service: Radiology;  Laterality: N/A;    PERITONEOCENTESIS N/A 7/22/2019    Procedure: PARACENTESIS, ABDOMINAL, INITIAL;  Surgeon: Dosc Diagnostic Provider;  Location: [facility] OR;  Service: Radiology;  Laterality: N/A;    PERITONEOCENTESIS N/A 8/16/2019    Procedure: PARACENTESIS, ABDOMINAL, INITIAL;  Surgeon: Dosc Diagnostic Provider;  Location: [facility] OR;  Service: Radiology;  Laterality: N/A;    PERITONEOCENTESIS N/A 9/26/2019    Procedure: PARACENTESIS, ABDOMINAL;  Surgeon: Dosc Diagnostic Provider;  Location: [facility] OR;  Service: Radiology;  Laterality: N/A;    PERITONEOCENTESIS N/A 10/11/2019    Procedure: PARACENTESIS, ABDOMINAL;  Surgeon: Dosc Diagnostic Provider;  Location: [facility] OR;  Service: Radiology;  Laterality: N/A;    PERITONEOCENTESIS N/A 10/31/2019    Procedure: PARACENTESIS, ABDOMINAL;  Surgeon: Dosc Diagnostic Provider;  Location: [facility] OR;  Service: Radiology;  Laterality: N/A;    PERITONEOCENTESIS N/A 11/18/2019    Procedure: PARACENTESIS, ABDOMINAL;  Surgeon: Dosc Diagnostic Provider;  Location: [facility];  Service: Radiology;  Laterality: N/A;    PERITONEOCENTESIS N/A 12/16/2019    Procedure: PARACENTESIS, ABDOMINAL;  Surgeon: Dosc Diagnostic Provider;  Location: [facility] OR;  Service: Radiology;  Laterality: N/A;  2PM START    PERITONEOCENTESIS N/A 2/7/2020    Procedure: PARACENTESIS, ABDOMINAL;  Surgeon: Dosc Diagnostic Provider;  Location: [facility] OR;   Service: Radiology;  Laterality: N/A;  REQUESTED 10:30A START    PERITONEOCENTESIS N/A 2/19/2020    Procedure: PARACENTESIS, ABDOMINAL;  Surgeon: Dosc Diagnostic Provider;  Location: [facility] OR;  Service: Radiology;  Laterality: N/A;    PHACOEMULSIFICATION OF CATARACT Right 9/26/2018    Procedure: PHACOEMULSIFICATION, CATARACT;  Surgeon: Perla Cortés MD;  Location: [facility] OR [address] [place] [address];  Service: Ophthalmology;  Laterality: Right;    REPEAT ABDOMINAL PARACENTESIS N/A 2/11/2019    Procedure: PARACENTESIS, ABDOMINAL, REPEAT;  Surgeon: Dosc Diagnostic Provider;  Location: [facility] OR;  Service: Radiology;  Laterality: N/A;  1PM START    REPEAT ABDOMINAL PARACENTESIS N/A 9/12/2019    Procedure: PARACENTESIS, ABDOMINAL, REPEAT;  Surgeon: Dos Diagnostic Provider;  Location: [facility] OR;  Service: Radiology;  Laterality: N/A;  9AM START    REPEAT ABDOMINAL PARACENTESIS N/A 12/2/2019    Procedure: PARACENTESIS, ABDOMINAL, REPEAT;  Surgeon: Dosc Diagnostic Provider;  Location: [facility] OR;  Service: Radiology;  Laterality: N/A;  3PM START    REPEAT ABDOMINAL PARACENTESIS N/A 1/10/2020    Procedure: PARACENTESIS, ABDOMINAL, REPEAT;  Surgeon: Dosc Diagnostic Provider;  Location: [facility] OR;  Service: Radiology;  Laterality: N/A;  1130AM START    REPEAT ABDOMINAL PARACENTESIS N/A 1/20/2020    Procedure: PARACENTESIS, ABDOMINAL, REPEAT;  Surgeon: Dosc Diagnostic Provider;  Location: [facility] OR;  Service: Radiology;  Laterality: N/A;  1PM START    REPEAT ABDOMINAL PARACENTESIS N/A 1/31/2020    Procedure: PARACENTESIS, ABDOMINAL, REPEAT;  Surgeon: Dos Diagnostic Provider;  Location: [facility] OR;  Service: Radiology;  Laterality: N/A;  10AM START    REVISION OF PROCEDURE INVOLVING GLAUCOMA DRAINAGE DEVICE Left 10/2/2019    EXTRUDING BAERVELDT /WITH PERICARDIAL PATCH GRAFT ()    Right foot surgery  10/2014    TOE AMPUTATION Right     first and second    TOE AMPUTATION Left 11/16/2018    Procedure: AMPUTATION, TOES  2-5;   Surgeon: Mitch Chan MD;  Location: [facility] OR;  Service: Vascular;  Laterality: Left;    TOE AMPUTATION Left 12/5/2018    Procedure: AMPUTATION, TOE;  Surgeon: Mitch Chan MD;  Location: [facility] OR;  Service: Vascular;  Laterality: Left;  left great toe    TONSILLECTOMY         Home Meds:   Prior to Admission medications    Medication Sig Start Date End Date Taking? Authorizing Provider   allopurinoL (ZYLOPRIM) 300 MG tablet TAKE 1 TABLET(300 MG) BY MOUTH EVERY DAY 2/23/20   Rubén Davis MD   aspirin (ECOTRIN) 81 MG EC tablet Take 81 mg by mouth once daily.    Historical Provider, MD   aspirin 325 MG tablet Take 325 mg by mouth once daily.     Historical Provider, MD   atropine 1% (ISOPTO ATROPINE) 1 % Drop Place 1 drop into the left eye once daily.    Perla Cortés MD   bisacodyl (DULCOLAX) 5 mg EC tablet Take 5 mg by mouth daily as needed for Constipation.    Historical Provider, MD   brimonidine 0.2% (ALPHAGAN) 0.2 % Drop Place 1 drop into both eyes 3 (three) times daily. 12/12/19   Perla Cortés MD   carvediloL (COREG) 3.125 MG tablet Take 1 tablet (3.125 mg total) by mouth 2 (two) times daily. 2/26/20 2/25/21  Rubén Davis MD   coenzyme Q10 100 mg capsule Take 100 mg by mouth every morning.    Historical Provider, MD   dorzolamide-timolol 2-0.5% (COSOPT) 22.3-6.8 mg/mL ophthalmic solution Place 1 drop into both eyes 3 (three) times daily. 12/12/19   Perla Cortés MD   ezetimibe (ZETIA) 10 mg tablet TAKE 1 TABLET(10 MG) BY MOUTH EVERY DAY 1/24/20   Rubén Davis MD   fish oil-omega-3 fatty acids 300-1,000 mg capsule Take 1 capsule by mouth 2 (two) times daily. 1/23/19   Sara Ching MD   gabapentin (NEURONTIN) 100 MG capsule Take 2 capsules (200 mg total) by mouth every evening. 1/28/20 1/27/21  Rubén Davis MD   insulin degludec-liraglutide (XULTOPHY 100/3.6) 100 unit-3.6 mg /mL (3 mL) InPn Inject 34 Units into the skin once daily. 1/28/20   Sheryl Madison NP  "  MULTIVIT,THER IRON,CA,FA & MIN (MULTIVITAMIN) Tab Take 1 tablet by mouth every morning.     Historical Provider, MD   pen needle, diabetic 31 gauge x 1/4" Ndle Use as directed 8/11/16   Mike Bass MD   prednisoLONE acetate (PRED FORTE) 1 % DrpS Place 1 drop into the left eye every morning. 10/10/19   Perla Cortés MD   torsemide (DEMADEX) 20 MG Tab Take 4 tablets (80 mg total) by mouth 2 (two) times daily. 2/26/20 3/27/20  Rubén Davis MD     Anticoagulants/Antiplatelets: no anticoagulation    Allergies:   Review of patient's allergies indicates:   Allergen Reactions    Statins-hmg-coa reductase inhibitors      Generalized Pain    Onglyza [saxagliptin]     Penicillins Rash     Sedation History:  no adverse reactions    Review of Systems:   Hematological: no known coagulopathies  Respiratory: positive for - orthopnea and shortness of breath  Cardiovascular: positive for - dyspnea on exertion, orthopnea and shortness of breath  Gastrointestinal: positive for - abdominal pain and distension  Genito-Urinary: no dysuria, trouble voiding, or hematuria  Musculoskeletal: negative  Neurological: no TIA or stroke symptoms       OBJECTIVE:     Vital Signs (Most Recent)     Physical Exam:  General: no acute distress  Mental Status: alert and oriented to person, place and time  HEENT: normocephalic, atraumatic  Chest: mild labored breathing  Heart: regular heart rate  Abdomen: +distended. No TTP/r/g.  Extremity: moves all extremities    Laboratory  Lab Results   Component Value Date    INR 1.1 05/31/2019       Lab Results   Component Value Date    WBC 5.13 08/22/2019    HGB 9.9 (L) 08/22/2019    HCT 32.0 (L) 08/22/2019    MCV 86 08/22/2019     08/22/2019      Lab Results   Component Value Date    GLU 83 08/22/2019     08/22/2019    K 3.6 08/22/2019     08/22/2019    CO2 28 08/22/2019    BUN 39 (H) 08/22/2019    CREATININE 1.1 08/22/2019    CALCIUM 8.8 08/22/2019    MG 2.1 05/31/2019    " ALT 12 08/22/2019    AST 14 08/22/2019    ALBUMIN 2.8 (L) 08/22/2019    BILITOT 0.5 08/22/2019       ASSESSMENT/PLAN:     59 y/o M with CKD III and combined systolic/diastolic CHF complicated by recurrent abdominal distension and pain 2/2 recurrent painful, tense ascites without TTP on PE to suggest SBP, requiring frequent large-volume therapeutic paracentesis.      1. Will attempt U/S-guided therapeutic paracentesis with local anesthetic only and albumin infusion post PRN per institutional protocol.      Risks (including, but not limited to, pain, bleeding, infection, damage to nearby structures, failure to obtain sufficient material for a diagnosis, the need for additional procedures, and death), benefits, and alternatives were discussed with the patient. All questions were answered to the best of my abilities. The patient wishes to proceed with the procedure. Written informed consent was obtained.     Thank you for considering IR for the care of your patient.      Zach Cha MD  Interventional Radiology

## 2020-02-28 NOTE — BRIEF OP NOTE
Radiology Post-Procedure Note     Pre Op Diagnosis: Recurrent painful, tense ascites  Post Op Diagnosis: Same     Procedure: U/S-guided therapeutic LVP     Procedure performed by: Zach Cha MD     Written Informed Consent Obtained: Yes  Specimen Removed: YES, 7150-L of straw-colored ascitic fluid  Estimated Blood Loss: none     Findings:   1. Successful therapeutic large-volume paracentesis with local anesthetic only and albumin infusion post PRN as indicated per institutional protocol. Patient tolerated the procedure well. No immediate post-procedural complications noted.       Thank you for considering IR for the care of your patient.      Zach Cha MD  Interventional Radiology

## 2020-03-03 ENCOUNTER — HOSPITAL ENCOUNTER (OUTPATIENT)
Dept: WOUND CARE | Facility: HOSPITAL | Age: 60
Discharge: HOME | End: 2020-03-03
Attending: FAMILY MEDICINE
Payer: MEDICAID

## 2020-03-03 DIAGNOSIS — I70.244 ATHEROSCLEROSIS OF NATIVE ARTERY OF LEFT LOWER EXTREMITY WITH ULCERATION OF MIDFOOT: Primary | ICD-10-CM

## 2020-03-03 PROCEDURE — 99214 OFFICE O/P EST MOD 30 MIN: CPT | Performed by: FAMILY MEDICINE

## 2020-03-03 PROCEDURE — 99214 OFFICE O/P EST MOD 30 MIN: CPT | Mod: ,,, | Performed by: FAMILY MEDICINE

## 2020-03-03 PROCEDURE — 99214 PR OFFICE/OUTPT VISIT, EST, LEVL IV, 30-39 MIN: ICD-10-PCS | Mod: ,,, | Performed by: FAMILY MEDICINE

## 2020-03-16 ENCOUNTER — HOSPITAL ENCOUNTER (OUTPATIENT)
Facility: HOSPITAL | Age: 60
Discharge: HOME | End: 2020-03-16
Attending: RADIOLOGY | Admitting: RADIOLOGY
Payer: MEDICAID

## 2020-03-16 VITALS
SYSTOLIC BLOOD PRESSURE: 131 MMHG | DIASTOLIC BLOOD PRESSURE: 67 MMHG | RESPIRATION RATE: 18 BRPM | TEMPERATURE: 97 F | HEART RATE: 80 BPM | OXYGEN SATURATION: 98 %

## 2020-03-16 DIAGNOSIS — R18.8 ASCITES: ICD-10-CM

## 2020-03-16 PROCEDURE — 63600175 PHARM REV CODE 636 W HCPCS: Mod: JG | Performed by: RADIOLOGY

## 2020-03-16 PROCEDURE — P9047 ALBUMIN (HUMAN), 25%, 50ML: HCPCS | Mod: JG | Performed by: RADIOLOGY

## 2020-03-16 RX ORDER — ALBUMIN HUMAN 250 G/1000ML
50 SOLUTION INTRAVENOUS ONCE AS NEEDED
Status: COMPLETED | OUTPATIENT
Start: 2020-03-16 | End: 2020-03-16

## 2020-03-16 RX ORDER — HYDROCODONE BITARTRATE AND ACETAMINOPHEN 5; 325 MG/1; MG/1
1 TABLET ORAL EVERY 4 HOURS PRN
Status: DISCONTINUED | OUTPATIENT
Start: 2020-03-16 | End: 2020-03-16 | Stop reason: HOSPADM

## 2020-03-16 RX ADMIN — ALBUMIN (HUMAN) 50 G: 12.5 SOLUTION INTRAVENOUS at 12:03

## 2020-03-16 NOTE — DISCHARGE SUMMARY
Radiology Discharge Summary      Hospital Course: No complications    Admit Date: 3/16/2020  Discharge Date: 03/16/2020     Instructions Given to Patient: Yes    Diet: Resume prior diet     Activity: activity as tolerated    Description of Condition on Discharge: Stable    Vital Signs (Most Recent): Pulse: 84 (03/16/20 1230)  Resp: 20 (03/16/20 1230)  BP: 130/70 (03/16/20 1215)  SpO2: 98 % (03/16/20 1230)    Discharge Disposition: Home    Discharge Diagnosis:   58 y/o M with h/o recurrent painful, tense ascites s/p successful U/S-guided therapeutic LVP with local anesthetic only and albumin infusion post PRN as indicated per institutional protocol. Patient tolerated the procedure well. No immediate post-procedural complications noted.      Thank you for considering IR for the care of your patient.      Zach Cha MD  Interventional Radiology

## 2020-03-16 NOTE — DISCHARGE INSTRUCTIONS
BATHING:  ? You may shower tomorrow.  DRESSING:  ? Remove dressing tomorrow.        ACTIVITY LEVEL: If you have received sedation or an anesthetic, you may feel sleepy for several hours. Rest until you are more awake. Gradually resume your normal activities    Do not drive, drink alcohol, or sign legal documents for 24 hours, or if taking narcotic pain medication.      DIET: You may resume your home diet. If nausea is present, increase your diet gradually with fluids and bland foods.    Medications: Pain medication should be taken only if needed and as directed. If antibiotics are prescribed, the medication should be taken until completed. You will be given an updated list of you medications.  ? No driving, alcoholic beverages or signing legal documents for next 24 hours if you have had sedation, or while taking pain medication    CALL THE DOCTOR:   For any obvious bleeding (some dried blood over the incision is normal).     Redness, swelling, foul smell around incision or fever over 101.  Shortness of breath.  Persistent pain or nausea not relieved by medication.  Call  [phone]     to speak with an Interventional Radiologist    If any unusual problems or difficulties occur contact your doctor. If you cannot contact your doctor but feel your signs and symptoms warrant a physicians attention return to the emergency room.    Fall Prevention  Millions of people fall every year and injure themselves. You may have had anesthesia or sedation which may increase your risk of falling. You may have health issues that put you at an increased risk of falling.     Here are ways to reduce your risk of falling.  ·   · Make your home safe by keeping walkways clear of objects you may trip over.  · Use non-slip pads under rugs. Do not use area rugs or small throw rugs.  · Use non-slip mats in bathtubs and showers.  · Install handrails and lights on staircases.  · Do not walk in poorly lit areas.  · Do not stand on chairs or wobbly  ladders.  · Use caution when reaching overhead or looking upward. This position can cause a loss of balance.  · Be sure your shoes fit properly, have non-slip bottoms and are in good condition.   · Wear shoes both inside and out. Avoid going barefoot or wearing slippers.  · Be cautious when going up and down stairs, curbs, and when walking on uneven sidewalks.  · If your balance is poor, consider using a cane or walker.  · If your fall was related to alcohol use, stop or limit alcohol intake.   · If your fall was related to use of sleeping medicines, talk to your doctor about this. You may need to reduce your dosage at bedtime if you awaken during the night to go to the bathroom.    · To reduce the need for nighttime bathroom trips:  ¨ Avoid drinking fluids for several hours before going to bed  ¨ Empty your bladder before going to bed  ¨ Men can keep a urinal at the bedside  · Stay as active as you can. Balance, flexibility, strength, and endurance all come from exercise. They all play a role in preventing falls. Ask your healthcare provider which types of activity are right for you.  · Get your vision checked on a regular basis.  · If you have pets, know where they are before you stand up or walk so you don't trip over them.  · Use night lights.

## 2020-03-16 NOTE — BRIEF OP NOTE
Radiology Post-Procedure Note     Pre Op Diagnosis: Recurrent painful, tense ascites  Post Op Diagnosis: Same     Procedure: U/S-guided therapeutic LVP     Procedure performed by: Zach Cha MD     Written Informed Consent Obtained: Yes  Specimen Removed: YES, 7000-L of thin, swati-colored ascitic fluid  Estimated Blood Loss: none     Findings:   1. Successful therapeutic large-volume paracentesis with local anesthetic only and albumin infusion post PRN as indicated per institutional protocol. Patient tolerated the procedure well. No immediate post-procedural complications noted.       Thank you for considering IR for the care of your patient.      Zach Cha MD  Interventional Radiology

## 2020-03-16 NOTE — PLAN OF CARE
Patient  tolerated procedure well. 7 liters of clear swati fluid  removed via paracentesis. Bandaid applied to site. No noted problems .

## 2020-03-16 NOTE — PLAN OF CARE
Patient was given instructions for procedure. All questions were answered.Patient in IR ultrasound room on stretcher. Consent is signed with patient and Dr Cha.

## 2020-03-16 NOTE — H&P
Radiology History & Physical      SUBJECTIVE:     Chief Complaint: Recurrent painful, tense ascites     History of Present Illness:  Marvin Ray is a 58 y.o. male with PMHx of CKD III and combined systolic/diastolic CHF complicated by recurrent abdominal distension and pain 2/2 recurrent painful, tense ascites without TTP on PE to suggest SBP requiring frequent  therapeutic LVP.      A new outpatient IR consult placed for US-guided therapeutic paracentesis.      Past Medical History:   Diagnosis Date    Arthritis     Cellulitis     CKD (chronic kidney disease), stage III     Coronary artery disease     Diabetes mellitus     Diabetic retinopathy     Diabetic ulcer of left foot     Glaucoma     Gout     Hyperlipemia     Hypertension     ICD (implantable cardioverter-defibrillator) in place 11/02/2018    Left chest    Non-pressure chronic ulcer of other part of left foot with fat layer exposed 10/23/2018    PVD (peripheral vascular disease)     Type 2 diabetes mellitus with left diabetic foot ulcer 10/29/2018    Unsteady gait     uses a wheelchair     Past Surgical History:   Procedure Laterality Date    AHMED GLAUCOMA IMPLANT Left 2011    DONE AT [facility]    AORTOGRAPHY WITH SERIALOGRAPHY Left 4/30/2019    Procedure: AORTOGRAM, WITH SERIALOGRAPHY, LEFT LOWER EXTREMITY, POSSIBLE INTERVENTION;  Surgeon: Mitch Chan MD;  Location: [facility];  Service: Vascular;  Laterality: Left;  RN PRE OP 4-23-19.  NO H & P, No Cosent  [place]    BAERVELDT GLAUCOMA IMPLANT Left 2012    WITH CATARACT EXTRACTION//DONE AT [facility]    CARDIAC CATHETERIZATION Left 05/2016    CARDIAC DEFIBRILLATOR PLACEMENT Left 11/02/2018    CATARACT EXTRACTION W/  INTRAOCULAR LENS IMPLANT Left 2012    WITH BAERVEDT//DONE AT [facility]    CATARACT EXTRACTION W/  INTRAOCULAR LENS IMPLANT Right 09/26/2018    COMPLEX ()    CB DESTRUCTION WITH CYCLO G6 Left 02/15/2017        CYST REMOVAL      DEBRIDEMENT OF  FOOT  12/28/2018    Procedure: DEBRIDEMENT, FOOT;  Surgeon: Mitch Wall MD;  Location: [facility] OR;  Service: Vascular;;    DEBRIDEMENT OF FOOT  6/5/2019    Procedure: DEBRIDEMENT, FOOT;  Surgeon: Mitch Wall MD;  Location: [facility] OR;  Service: Vascular;;    EXAMINATION UNDER ANESTHESIA Left 12/5/2018    Procedure: Exam under anesthesia, left foot debridement, washout and all other indicated procedures;  Surgeon: Mitch Wall MD;  Location: [facility] OR;  Service: Vascular;  Laterality: Left;  1030AM START  RN PREOP 12/3/2018-----AICD  SEEN BY DR JAMES 12/4    EXAMINATION UNDER ANESTHESIA Left 2/13/2019    Procedure: LEFT FOOT WOUND DEBRIDEMENT, WASHOUT AND ALL OTHER INDICATED PROCEDURES;  Surgeon: Mitch Wall MD;  Location: [facility] OR;  Service: Vascular;  Laterality: Left;  requesting 1st case start  THIS CASE 1ST PER DR WALL ON 2/1/19 @ 844AM -[place]  RN PREOP 2/6/2019  HAS CARDIAC CLEARANCE    EXAMINATION UNDER ANESTHESIA Left 12/28/2018    Procedure: Exam under anesthesia, left foot debridement, left foot washout, possible left second through fifth metatarsal resection;  Surgeon: Mitch Wall MD;  Location: [facility] OR;  Service: Vascular;  Laterality: Left;  1:00pm start per julissa @ 8:58am  RN  PHONE PRE OP 12-27-18--=Pt coming from [facility] on [address]  NEED CONSENT    EXAMINATION UNDER ANESTHESIA Left 6/5/2019    Procedure: LEFT FOOT DEBRIDEMENT, WASHOUT AND ALL OTHER INDICATED PROCEDURES;  Surgeon: Mitch Wall MD;  Location: [facility] OR;  Service: Vascular;  Laterality: Left;  RN PRE OP 5-31-19------ICD------NEED CONSENT    INCISION AND DRAINAGE Left 11/14/2018    Procedure: Incision and Drainage, left lower extremity debridement, washout;  Surgeon: Mitch Wall MD;  Location: [facility] OR;  Service: Vascular;  Laterality: Left;    INCISION AND DRAINAGE Left 11/16/2018    Procedure: INCISION AND DRAINAGE;  Surgeon: Mitch Wall MD;  Location: [facility] OR;   Service: Vascular;  Laterality: Left;    INTRAOCULAR PROSTHESES INSERTION Right 9/26/2018    Procedure: INSERTION, IOL PROSTHESIS;  Surgeon: Perla Cortés MD;  Location: [address];  Service: Ophthalmology;  Laterality: Right;    PERITONEOCENTESIS N/A 3/1/2019    Procedure: PARACENTESIS, ABDOMINAL, INITIAL;  Surgeon: Dosc Diagnostic Provider;  Location: [facility] OR;  Service: Radiology;  Laterality: N/A;    PERITONEOCENTESIS N/A 3/25/2019    Procedure: PARACENTESIS, ABDOMINAL, INITIAL;  Surgeon: Dosc Diagnostic Provider;  Location: [facility] OR;  Service: Radiology;  Laterality: N/A;    PERITONEOCENTESIS N/A 7/22/2019    Procedure: PARACENTESIS, ABDOMINAL, INITIAL;  Surgeon: Dosc Diagnostic Provider;  Location: [facility] OR;  Service: Radiology;  Laterality: N/A;    PERITONEOCENTESIS N/A 8/16/2019    Procedure: PARACENTESIS, ABDOMINAL, INITIAL;  Surgeon: Dosc Diagnostic Provider;  Location: [facility] OR;  Service: Radiology;  Laterality: N/A;    PERITONEOCENTESIS N/A 9/26/2019    Procedure: PARACENTESIS, ABDOMINAL;  Surgeon: Dosc Diagnostic Provider;  Location: [facility] OR;  Service: Radiology;  Laterality: N/A;    PERITONEOCENTESIS N/A 10/11/2019    Procedure: PARACENTESIS, ABDOMINAL;  Surgeon: Dosc Diagnostic Provider;  Location: [facility] OR;  Service: Radiology;  Laterality: N/A;    PERITONEOCENTESIS N/A 10/31/2019    Procedure: PARACENTESIS, ABDOMINAL;  Surgeon: Dosc Diagnostic Provider;  Location: [facility] OR;  Service: Radiology;  Laterality: N/A;    PERITONEOCENTESIS N/A 11/18/2019    Procedure: PARACENTESIS, ABDOMINAL;  Surgeon: Dosc Diagnostic Provider;  Location: [facility];  Service: Radiology;  Laterality: N/A;    PERITONEOCENTESIS N/A 12/16/2019    Procedure: PARACENTESIS, ABDOMINAL;  Surgeon: Dosc Diagnostic Provider;  Location: [facility] OR;  Service: Radiology;  Laterality: N/A;  2PM START    PERITONEOCENTESIS N/A 2/7/2020    Procedure: PARACENTESIS, ABDOMINAL;  Surgeon: Dosc Diagnostic Provider;  Location: [facility] OR;   Service: Radiology;  Laterality: N/A;  REQUESTED 10:30A START    PERITONEOCENTESIS N/A 2/19/2020    Procedure: PARACENTESIS, ABDOMINAL;  Surgeon: Dosc Diagnostic Provider;  Location: [facility] OR;  Service: Radiology;  Laterality: N/A;    PERITONEOCENTESIS N/A 2/28/2020    Procedure: PARACENTESIS, ABDOMINAL;  Surgeon: Dosc Diagnostic Provider;  Location: [facility] OR;  Service: Radiology;  Laterality: N/A;  REQUESTED 10AM START    PHACOEMULSIFICATION OF CATARACT Right 9/26/2018    Procedure: PHACOEMULSIFICATION, CATARACT;  Surgeon: Perla Cortés MD;  Location: [facility] OR [address] [place] [address];  Service: Ophthalmology;  Laterality: Right;    REPEAT ABDOMINAL PARACENTESIS N/A 2/11/2019    Procedure: PARACENTESIS, ABDOMINAL, REPEAT;  Surgeon: Dos Diagnostic Provider;  Location: [facility] OR;  Service: Radiology;  Laterality: N/A;  1PM START    REPEAT ABDOMINAL PARACENTESIS N/A 9/12/2019    Procedure: PARACENTESIS, ABDOMINAL, REPEAT;  Surgeon: Dos Diagnostic Provider;  Location: [facility] OR;  Service: Radiology;  Laterality: N/A;  9AM START    REPEAT ABDOMINAL PARACENTESIS N/A 12/2/2019    Procedure: PARACENTESIS, ABDOMINAL, REPEAT;  Surgeon: Dosc Diagnostic Provider;  Location: [facility] OR;  Service: Radiology;  Laterality: N/A;  3PM START    REPEAT ABDOMINAL PARACENTESIS N/A 1/10/2020    Procedure: PARACENTESIS, ABDOMINAL, REPEAT;  Surgeon: Dosc Diagnostic Provider;  Location: [facility] OR;  Service: Radiology;  Laterality: N/A;  1130AM START    REPEAT ABDOMINAL PARACENTESIS N/A 1/20/2020    Procedure: PARACENTESIS, ABDOMINAL, REPEAT;  Surgeon: Dosc Diagnostic Provider;  Location: [facility] OR;  Service: Radiology;  Laterality: N/A;  1PM START    REPEAT ABDOMINAL PARACENTESIS N/A 1/31/2020    Procedure: PARACENTESIS, ABDOMINAL, REPEAT;  Surgeon: Dosc Diagnostic Provider;  Location: [facility] OR;  Service: Radiology;  Laterality: N/A;  10AM START    REVISION OF PROCEDURE INVOLVING GLAUCOMA DRAINAGE DEVICE Left 10/2/2019    EXTRUDING BAERVELDT /WITH  "PERICARDIAL PATCH GRAFT ()    Right foot surgery  10/2014    TOE AMPUTATION Right     first and second    TOE AMPUTATION Left 11/16/2018    Procedure: AMPUTATION, TOES  2-5;  Surgeon: Mitch Chan MD;  Location: [facility] OR;  Service: Vascular;  Laterality: Left;    TOE AMPUTATION Left 12/5/2018    Procedure: AMPUTATION, TOE;  Surgeon: Mitch Chan MD;  Location: [facility] OR;  Service: Vascular;  Laterality: Left;  left great toe    TONSILLECTOMY       Home Meds:   Prior to Admission medications    Medication Sig Start Date End Date Taking? Authorizing Provider   allopurinoL (ZYLOPRIM) 300 MG tablet TAKE 1 TABLET(300 MG) BY MOUTH EVERY DAY 2/23/20  Yes Rubén Davis MD   aspirin (ECOTRIN) 81 MG EC tablet Take 81 mg by mouth once daily.   Yes Historical Provider, MD   brimonidine 0.2% (ALPHAGAN) 0.2 % Drop Place 1 drop into both eyes 3 (three) times daily. 12/12/19  Yes Perla Cortés MD   coenzyme Q10 100 mg capsule Take 100 mg by mouth every morning.   Yes Historical Provider, MD   dorzolamide-timolol 2-0.5% (COSOPT) 22.3-6.8 mg/mL ophthalmic solution Place 1 drop into both eyes 3 (three) times daily. 12/12/19  Yes Perla Cortés MD   ezetimibe (ZETIA) 10 mg tablet TAKE 1 TABLET(10 MG) BY MOUTH EVERY DAY 1/24/20  Yes Rubén Davis MD   fish oil-omega-3 fatty acids 300-1,000 mg capsule Take 1 capsule by mouth 2 (two) times daily. 1/23/19  Yes Sara Ching MD   gabapentin (NEURONTIN) 100 MG capsule Take 2 capsules (200 mg total) by mouth every evening. 1/28/20 1/27/21 Yes Rubén Davis MD   insulin degludec-liraglutide (XULTOPHY 100/3.6) 100 unit-3.6 mg /mL (3 mL) InPn Inject 34 Units into the skin once daily. 1/28/20  Yes Sheryl Madison NP   MULTIVIT,THER IRON,CA,FA & MIN (MULTIVITAMIN) Tab Take 1 tablet by mouth every morning.    Yes Historical Provider, MD   pen needle, diabetic 31 gauge x 1/4" Ndle Use as directed 8/11/16  Yes Mike Bass MD   torsemide " (DEMADEX) 20 MG Tab Take 4 tablets (80 mg total) by mouth 2 (two) times daily. 2/26/20 3/27/20 Yes Rubén Davis MD   bisacodyl (DULCOLAX) 5 mg EC tablet Take 5 mg by mouth daily as needed for Constipation.    Historical Provider, MD   carvediloL (COREG) 3.125 MG tablet Take 1 tablet (3.125 mg total) by mouth 2 (two) times daily. 2/26/20 2/25/21  Rubén Davis MD   aspirin 325 MG tablet Take 325 mg by mouth once daily.   3/16/20  Historical Provider, MD   atropine 1% (ISOPTO ATROPINE) 1 % Drop Place 1 drop into the left eye once daily.  3/16/20  Perla Cortés MD   prednisoLONE acetate (PRED FORTE) 1 % DrpS Place 1 drop into the left eye every morning. 10/10/19 3/16/20  Perla Cortés MD     Anticoagulants/Antiplatelets: no anticoagulation    Allergies:   Review of patient's allergies indicates:   Allergen Reactions    Statins-hmg-coa reductase inhibitors      Generalized Pain    Onglyza [saxagliptin]     Penicillins Rash     Sedation History:  no adverse reactions     Review of Systems:   Hematological: no known coagulopathies  Respiratory: positive for - orthopnea and shortness of breath  Cardiovascular: positive for - dyspnea on exertion, orthopnea and shortness of breath  Gastrointestinal: positive for - abdominal pain and distension  Genito-Urinary: no dysuria, trouble voiding, or hematuria  Musculoskeletal: negative  Neurological: no TIA or stroke symptoms       OBJECTIVE:     Vital Signs (Most Recent)     Physical Exam:  General: no acute distress  Mental Status: alert and oriented to person, place and time  HEENT: normocephalic, atraumatic  Chest: mild labored breathing  Heart: regular heart rate  Abdomen: +distended. No TTP/r/g.  Extremity: moves all extremities    Laboratory  Lab Results   Component Value Date    INR 1.1 05/31/2019       Lab Results   Component Value Date    WBC 5.13 08/22/2019    HGB 9.9 (L) 08/22/2019    HCT 32.0 (L) 08/22/2019    MCV 86 08/22/2019      08/22/2019      Lab Results   Component Value Date    GLU 83 08/22/2019     08/22/2019    K 3.6 08/22/2019     08/22/2019    CO2 28 08/22/2019    BUN 39 (H) 08/22/2019    CREATININE 1.1 08/22/2019    CALCIUM 8.8 08/22/2019    MG 2.1 05/31/2019    ALT 12 08/22/2019    AST 14 08/22/2019    ALBUMIN 2.8 (L) 08/22/2019    BILITOT 0.5 08/22/2019       ASSESSMENT/PLAN:     59 y/o M with CKD III and combined systolic/diastolic CHF complicated by recurrent abdominal distension and pain 2/2 recurrent painful, tense ascites without TTP on PE to suggest SBP, requiring frequent large-volume therapeutic paracentesis.      1. Will attempt U/S-guided therapeutic paracentesis with local anesthetic only and albumin infusion post PRN per institutional protocol.      Risks (including, but not limited to, pain, bleeding, infection, damage to nearby structures, failure to obtain sufficient material for a diagnosis, the need for additional procedures, and death), benefits, and alternatives were discussed with the patient. All questions were answered to the best of my abilities. The patient wishes to proceed with the procedure. Written informed consent was obtained.     Thank you for considering IR for the care of your patient.      Zach Cha MD  Interventional Radiology

## 2020-03-29 DIAGNOSIS — R20.2 PARESTHESIA: ICD-10-CM

## 2020-03-30 ENCOUNTER — HOSPITAL ENCOUNTER (OUTPATIENT)
Dept: INTERVENTIONAL RADIOLOGY/VASCULAR | Facility: HOSPITAL | Age: 60
Discharge: HOME | End: 2020-03-30
Attending: FAMILY MEDICINE
Payer: MEDICAID

## 2020-03-30 ENCOUNTER — HOSPITAL ENCOUNTER (OUTPATIENT)
Facility: HOSPITAL | Age: 60
Discharge: HOME | End: 2020-03-30
Attending: RADIOLOGY | Admitting: RADIOLOGY
Payer: MEDICAID

## 2020-03-30 VITALS — DIASTOLIC BLOOD PRESSURE: 66 MMHG | SYSTOLIC BLOOD PRESSURE: 111 MMHG

## 2020-03-30 VITALS
TEMPERATURE: 99 F | RESPIRATION RATE: 16 BRPM | OXYGEN SATURATION: 99 % | SYSTOLIC BLOOD PRESSURE: 124 MMHG | HEART RATE: 74 BPM | DIASTOLIC BLOOD PRESSURE: 66 MMHG

## 2020-03-30 DIAGNOSIS — R18.8 ASCITES: ICD-10-CM

## 2020-03-30 PROCEDURE — A7048 VACUUM DRAIN BOTTLE/TUBE KIT: HCPCS

## 2020-03-30 PROCEDURE — P9047 ALBUMIN (HUMAN), 25%, 50ML: HCPCS | Mod: JG | Performed by: RADIOLOGY

## 2020-03-30 PROCEDURE — 63600175 PHARM REV CODE 636 W HCPCS: Mod: JG | Performed by: RADIOLOGY

## 2020-03-30 PROCEDURE — 49083 ABD PARACENTESIS W/IMAGING: CPT | Mod: ,,, | Performed by: RADIOLOGY

## 2020-03-30 PROCEDURE — 49083 IR PARACENTESIS WITH IMAGING: ICD-10-PCS | Mod: ,,, | Performed by: RADIOLOGY

## 2020-03-30 PROCEDURE — 49083 ABD PARACENTESIS W/IMAGING: CPT

## 2020-03-30 RX ORDER — ALBUMIN HUMAN 250 G/1000ML
50 SOLUTION INTRAVENOUS ONCE AS NEEDED
Status: COMPLETED | OUTPATIENT
Start: 2020-03-30 | End: 2020-03-30

## 2020-03-30 RX ORDER — ALBUMIN HUMAN 250 G/1000ML
50 SOLUTION INTRAVENOUS ONCE AS NEEDED
Status: CANCELLED | OUTPATIENT
Start: 2020-03-30 | End: 2031-08-27

## 2020-03-30 RX ORDER — GABAPENTIN 100 MG/1
CAPSULE ORAL
Qty: 60 CAPSULE | Refills: 1 | Status: SHIPPED | OUTPATIENT
Start: 2020-03-30 | End: 2020-05-31

## 2020-03-30 RX ADMIN — ALBUMIN (HUMAN) 50 G: 12.5 SOLUTION INTRAVENOUS at 11:03

## 2020-03-30 NOTE — BRIEF OP NOTE
Radiology Post-Procedure Note     Pre Op Diagnosis: Recurrent painful, tense ascites  Post Op Diagnosis: Same     Procedure: U/S-guided therapeutic LVP     Procedure performed by: Zach Cha MD     Written Informed Consent Obtained: Yes  Specimen Removed: YES, 6.4-L of thin, straw-colored ascitic fluid  Estimated Blood Loss: none     Findings:   1. Successful therapeutic large-volume paracentesis with local anesthetic only and albumin infusion post PRN as indicated per institutional protocol. Patient tolerated the procedure well. No immediate post-procedural complications noted.       Thank you for considering IR for the care of your patient.      Zach Cha MD  Interventional Radiology

## 2020-03-30 NOTE — DISCHARGE SUMMARY
Radiology Discharge Summary      Hospital Course: No complications    Admit Date: 3/30/2020  Discharge Date: 03/30/2020     Instructions Given to Patient: Yes    Diet: Resume prior diet     Activity: activity as tolerated    Description of Condition on Discharge: Stable    Vital Signs (Most Recent): BP: 111/66 (03/30/20 1050)    Discharge Disposition: Home    Discharge Diagnosis:   58 y/o M with h/o recurrent painful, tense ascites s/p successful U/S-guided therapeutic LVP with local anesthetic only and albumin infusion post PRN as indicated per institutional protocol. Patient tolerated the procedure well. No immediate post-procedural complications noted.      Thank you for considering IR for the care of your patient.      Zach Cha MD  Interventional Radiology

## 2020-03-30 NOTE — H&P
Radiology History & Physical      SUBJECTIVE:     Chief Complaint: Recurrent painful, tense ascites     History of Present Illness:  Marvin Ray is a 58 y.o. male with PMHx of CKD III and combined systolic/diastolic CHF complicated by recurrent abdominal distension and pain 2/2 recurrent painful, tense ascites without TTP on PE to suggest SBP requiring frequent  therapeutic LVP.      A new outpatient IR consult placed for US-guided therapeutic paracentesis.       Past Medical History:   Diagnosis Date    Arthritis     Cellulitis     CKD (chronic kidney disease), stage III     Coronary artery disease     Diabetes mellitus     Diabetic retinopathy     Diabetic ulcer of left foot     Glaucoma     Gout     Hyperlipemia     Hypertension     ICD (implantable cardioverter-defibrillator) in place 11/02/2018    Left chest    Non-pressure chronic ulcer of other part of left foot with fat layer exposed 10/23/2018    PVD (peripheral vascular disease)     Type 2 diabetes mellitus with left diabetic foot ulcer 10/29/2018    Unsteady gait     uses a wheelchair     Past Surgical History:   Procedure Laterality Date    AHMED GLAUCOMA IMPLANT Left 2011    DONE AT [facility]    AORTOGRAPHY WITH SERIALOGRAPHY Left 4/30/2019    Procedure: AORTOGRAM, WITH SERIALOGRAPHY, LEFT LOWER EXTREMITY, POSSIBLE INTERVENTION;  Surgeon: Mitch Chan MD;  Location[facility];  Service: Vascular;  Laterality: Left;  RN PRE OP 4-23-19.  NO H & P, No Cosent  [place]    BAERVELDT GLAUCOMA IMPLANT Left 2012    WITH CATARACT EXTRACTION//DONE AT [facility]    CARDIAC CATHETERIZATION Left 05/2016    CARDIAC DEFIBRILLATOR PLACEMENT Left 11/02/2018    CATARACT EXTRACTION W/  INTRAOCULAR LENS IMPLANT Left 2012    WITH BAERVEDT//DONE AT [facility]    CATARACT EXTRACTION W/  INTRAOCULAR LENS IMPLANT Right 09/26/2018    COMPLEX ()    CB DESTRUCTION WITH CYCLO G6 Left 02/15/2017        CYST REMOVAL      DEBRIDEMENT OF  FOOT  12/28/2018    Procedure: DEBRIDEMENT, FOOT;  Surgeon: Mitch Wall MD;  Location: [facility] OR;  Service: Vascular;;    DEBRIDEMENT OF FOOT  6/5/2019    Procedure: DEBRIDEMENT, FOOT;  Surgeon: Mitch Wall MD;  Location: [facility] OR;  Service: Vascular;;    EXAMINATION UNDER ANESTHESIA Left 12/5/2018    Procedure: Exam under anesthesia, left foot debridement, washout and all other indicated procedures;  Surgeon: Mitch Wall MD;  Location: [facility] OR;  Service: Vascular;  Laterality: Left;  1030AM START  RN PREOP 12/3/2018-----AICD  SEEN BY DR JAMES 12/4    EXAMINATION UNDER ANESTHESIA Left 2/13/2019    Procedure: LEFT FOOT WOUND DEBRIDEMENT, WASHOUT AND ALL OTHER INDICATED PROCEDURES;  Surgeon: Mitch Wall MD;  Location: [facility] OR;  Service: Vascular;  Laterality: Left;  requesting 1st case start  THIS CASE 1ST PER DR WALL ON 2/1/19 @ 844AM -[place]  RN PREOP 2/6/2019  HAS CARDIAC CLEARANCE    EXAMINATION UNDER ANESTHESIA Left 12/28/2018    Procedure: Exam under anesthesia, left foot debridement, left foot washout, possible left second through fifth metatarsal resection;  Surgeon: Mitch Wall MD;  Location: [facility] OR;  Service: Vascular;  Laterality: Left;  1:00pm start per julissa @ 8:58am  RN  PHONE PRE OP 12-27-18--=[facility] [place] [address]  NEED CONSENT    EXAMINATION UNDER ANESTHESIA Left 6/5/2019    Procedure: LEFT FOOT DEBRIDEMENT, WASHOUT AND ALL OTHER INDICATED PROCEDURES;  Surgeon: Mitch Wall MD;  Location: [facility] OR;  Service: Vascular;  Laterality: Left;  RN PRE OP 5-31-19------ICD------NEED CONSENT    INCISION AND DRAINAGE Left 11/14/2018    Procedure: Incision and Drainage, left lower extremity debridement, washout;  Surgeon: Mitch Wall MD;  Location: [facility] OR;  Service: Vascular;  Laterality: Left;    INCISION AND DRAINAGE Left 11/16/2018    Procedure: INCISION AND DRAINAGE;  Surgeon: Mitch Wall MD;  Location: [facility] OR;   Service: Vascular;  Laterality: Left;    INTRAOCULAR PROSTHESES INSERTION Right 9/26/2018    Procedure: INSERTION, IOL PROSTHESIS;  Surgeon: Perla Cortés MD;  Location: [address];  Service: Ophthalmology;  Laterality: Right;    PERITONEOCENTESIS N/A 3/1/2019    Procedure: PARACENTESIS, ABDOMINAL, INITIAL;  Surgeon: Dosc Diagnostic Provider;  Location: [facility] OR;  Service: Radiology;  Laterality: N/A;    PERITONEOCENTESIS N/A 3/25/2019    Procedure: PARACENTESIS, ABDOMINAL, INITIAL;  Surgeon: Dosc Diagnostic Provider;  Location: [facility] OR;  Service: Radiology;  Laterality: N/A;    PERITONEOCENTESIS N/A 7/22/2019    Procedure: PARACENTESIS, ABDOMINAL, INITIAL;  Surgeon: Dosc Diagnostic Provider;  Location: [facility] OR;  Service: Radiology;  Laterality: N/A;    PERITONEOCENTESIS N/A 8/16/2019    Procedure: PARACENTESIS, ABDOMINAL, INITIAL;  Surgeon: Dosc Diagnostic Provider;  Location: [facility] OR;  Service: Radiology;  Laterality: N/A;    PERITONEOCENTESIS N/A 9/26/2019    Procedure: PARACENTESIS, ABDOMINAL;  Surgeon: Dosc Diagnostic Provider;  Location: [facility] OR;  Service: Radiology;  Laterality: N/A;    PERITONEOCENTESIS N/A 10/11/2019    Procedure: PARACENTESIS, ABDOMINAL;  Surgeon: Dosc Diagnostic Provider;  Location: [facility] OR;  Service: Radiology;  Laterality: N/A;    PERITONEOCENTESIS N/A 10/31/2019    Procedure: PARACENTESIS, ABDOMINAL;  Surgeon: Dosc Diagnostic Provider;  Location: [facility] OR;  Service: Radiology;  Laterality: N/A;    PERITONEOCENTESIS N/A 11/18/2019    Procedure: PARACENTESIS, ABDOMINAL;  Surgeon: Dosc Diagnostic Provider;  Location: [facility];  Service: Radiology;  Laterality: N/A;    PERITONEOCENTESIS N/A 12/16/2019    Procedure: PARACENTESIS, ABDOMINAL;  Surgeon: Dosc Diagnostic Provider;  Location: [facility] OR;  Service: Radiology;  Laterality: N/A;  2PM START    PERITONEOCENTESIS N/A 2/7/2020    Procedure: PARACENTESIS, ABDOMINAL;  Surgeon: Dosc Diagnostic Provider;  Location: [facility] OR;   Service: Radiology;  Laterality: N/A;  REQUESTED 10:30A START    PERITONEOCENTESIS N/A 2/19/2020    Procedure: PARACENTESIS, ABDOMINAL;  Surgeon: Dos Diagnostic Provider;  Location: [facility] OR;  Service: Radiology;  Laterality: N/A;    PERITONEOCENTESIS N/A 2/28/2020    Procedure: PARACENTESIS, ABDOMINAL;  Surgeon: Dos Diagnostic Provider;  Location: [facility] OR;  Service: Radiology;  Laterality: N/A;  REQUESTED 10AM START    PHACOEMULSIFICATION OF CATARACT Right 9/26/2018    Procedure: PHACOEMULSIFICATION, CATARACT;  Surgeon: Perla Cortés MD;  [facility];  Service: Ophthalmology;  Laterality: Right;    REPEAT ABDOMINAL PARACENTESIS N/A 2/11/2019    Procedure: PARACENTESIS, ABDOMINAL, REPEAT;  Surgeon: [facility] Diagnostic Provider;  Location: [facility] OR;  Service: Radiology;  Laterality: N/A;  1PM START    REPEAT ABDOMINAL PARACENTESIS N/A 9/12/2019    Procedure: PARACENTESIS, ABDOMINAL, REPEAT;  Surgeon: [facility] Diagnostic Provider;  Location: [facility] OR;  Service: Radiology;  Laterality: N/A;  9AM START    REPEAT ABDOMINAL PARACENTESIS N/A 12/2/2019    Procedure: PARACENTESIS, ABDOMINAL, REPEAT;  Surgeon: Dos Diagnostic Provider;  Location: [facility] OR;  Service: Radiology;  Laterality: N/A;  3PM START    REPEAT ABDOMINAL PARACENTESIS N/A 1/10/2020    Procedure: PARACENTESIS, ABDOMINAL, REPEAT;  Surgeon: Dos Diagnostic Provider;  Location: [facility] OR;  Service: Radiology;  Laterality: N/A;  1130AM START    REPEAT ABDOMINAL PARACENTESIS N/A 1/20/2020    Procedure: PARACENTESIS, ABDOMINAL, REPEAT;  Surgeon: Dos Diagnostic Provider;  Location: [facility] OR;  Service: Radiology;  Laterality: N/A;  1PM START    REPEAT ABDOMINAL PARACENTESIS N/A 1/31/2020    Procedure: PARACENTESIS, ABDOMINAL, REPEAT;  Surgeon: [facility] Diagnostic Provider;  Location: [facility] OR;  Service: Radiology;  Laterality: N/A;  10AM START    REPEAT ABDOMINAL PARACENTESIS N/A 3/16/2020    Procedure: PARACENTESIS, ABDOMINAL, REPEAT;  Surgeon: Dosc  Diagnostic Provider;  Location: [facility] OR;  Service: Radiology;  Laterality: N/A;  10AM START    REVISION OF PROCEDURE INVOLVING GLAUCOMA DRAINAGE DEVICE Left 10/2/2019    EXTRUDING BAERVELDT /WITH PERICARDIAL PATCH GRAFT ()    Right foot surgery  10/2014    TOE AMPUTATION Right     first and second    TOE AMPUTATION Left 11/16/2018    Procedure: AMPUTATION, TOES  2-5;  Surgeon: Mitch Chan MD;  Location: [facility] OR;  Service: Vascular;  Laterality: Left;    TOE AMPUTATION Left 12/5/2018    Procedure: AMPUTATION, TOE;  Surgeon: Mitch Chan MD;  Location: [facility] OR;  Service: Vascular;  Laterality: Left;  left great toe    TONSILLECTOMY       Home Meds:   Prior to Admission medications    Medication Sig Start Date End Date Taking? Authorizing Provider   allopurinoL (ZYLOPRIM) 300 MG tablet TAKE 1 TABLET(300 MG) BY MOUTH EVERY DAY 2/23/20   Rubén Davis MD   aspirin (ECOTRIN) 81 MG EC tablet Take 81 mg by mouth once daily.    Historical Provider, MD   bisacodyl (DULCOLAX) 5 mg EC tablet Take 5 mg by mouth daily as needed for Constipation.    Historical Provider, MD   brimonidine 0.2% (ALPHAGAN) 0.2 % Drop Place 1 drop into both eyes 3 (three) times daily. 12/12/19   Perla Cortés MD   carvediloL (COREG) 3.125 MG tablet Take 1 tablet (3.125 mg total) by mouth 2 (two) times daily. 2/26/20 2/25/21  Rubén Davis MD   coenzyme Q10 100 mg capsule Take 100 mg by mouth every morning.    Historical Provider, MD   dorzolamide-timolol 2-0.5% (COSOPT) 22.3-6.8 mg/mL ophthalmic solution Place 1 drop into both eyes 3 (three) times daily. 12/12/19   Perla Cortés MD   ezetimibe (ZETIA) 10 mg tablet TAKE 1 TABLET(10 MG) BY MOUTH EVERY DAY 1/24/20   Rubén Davis MD   fish oil-omega-3 fatty acids 300-1,000 mg capsule Take 1 capsule by mouth 2 (two) times daily. 1/23/19   Sara Ching MD   gabapentin (NEURONTIN) 100 MG capsule TAKE 2 CAPSULES(200 MG) BY MOUTH EVERY EVENING  "3/30/20   Rubén Davis MD   insulin degludec-liraglutide (XULTOPHY 100/3.6) 100 unit-3.6 mg /mL (3 mL) InPn Inject 34 Units into the skin once daily. 1/28/20   Sheryl Madison NP   MULTIVIT,THER IRON,CA,FA & MIN (MULTIVITAMIN) Tab Take 1 tablet by mouth every morning.     Historical Provider, MD   pen needle, diabetic 31 gauge x 1/4" Ndle Use as directed 8/11/16   Mike Bass MD   torsemide (DEMADEX) 20 MG Tab Take 4 tablets (80 mg total) by mouth 2 (two) times daily. 2/26/20 3/27/20  Rubén Davis MD   gabapentin (NEURONTIN) 100 MG capsule Take 2 capsules (200 mg total) by mouth every evening. 1/28/20 3/30/20  Rubén Davis MD     Anticoagulants/Antiplatelets: no anticoagulation    Allergies:   Review of patient's allergies indicates:   Allergen Reactions    Statins-hmg-coa reductase inhibitors      Generalized Pain    Onglyza [saxagliptin]     Penicillins Rash     Sedation History:  no adverse reactions     Review of Systems:   Hematological: no known coagulopathies  Respiratory: positive for - orthopnea and shortness of breath  Cardiovascular: positive for - dyspnea on exertion, orthopnea and shortness of breath  Gastrointestinal: positive for - abdominal pain and distension  Genito-Urinary: no dysuria, trouble voiding, or hematuria  Musculoskeletal: negative  Neurological: no TIA or stroke symptoms       OBJECTIVE:     Vital Signs (Most Recent)    Physical Exam:  General: no acute distress  Mental Status: alert and oriented to person, place and time  HEENT: normocephalic, atraumatic  Chest: mild labored breathing  Heart: regular heart rate  Abdomen: +distended. No TTP/r/g.  Extremity: moves all extremities    Laboratory  Lab Results   Component Value Date    INR 1.1 05/31/2019       Lab Results   Component Value Date    WBC 5.13 08/22/2019    HGB 9.9 (L) 08/22/2019    HCT 32.0 (L) 08/22/2019    MCV 86 08/22/2019     08/22/2019      Lab Results   Component Value Date    GLU 83 08/22/2019 "     08/22/2019    K 3.6 08/22/2019     08/22/2019    CO2 28 08/22/2019    BUN 39 (H) 08/22/2019    CREATININE 1.1 08/22/2019    CALCIUM 8.8 08/22/2019    MG 2.1 05/31/2019    ALT 12 08/22/2019    AST 14 08/22/2019    ALBUMIN 2.8 (L) 08/22/2019    BILITOT 0.5 08/22/2019     ASSESSMENT/PLAN:     57 y/o M with CKD III and combined systolic/diastolic CHF complicated by recurrent abdominal distension and pain 2/2 recurrent painful, tense ascites without TTP on PE to suggest SBP, requiring frequent large-volume therapeutic paracentesis.      1. Will attempt U/S-guided therapeutic paracentesis with local anesthetic only and albumin infusion post PRN per institutional protocol.      Risks (including, but not limited to, pain, bleeding, infection, damage to nearby structures, failure to obtain sufficient material for a diagnosis, the need for additional procedures, and death), benefits, and alternatives were discussed with the patient. All questions were answered to the best of my abilities. The patient wishes to proceed with the procedure. Written informed consent was obtained.     Thank you for considering IR for the care of your patient.      Zach Cha MD  Interventional Radiology

## 2020-03-30 NOTE — NURSING
6400 mL Peritoneal fluid drained from abdomen. Pt to go to [facility] for ALbumin. Report given to Tam

## 2020-04-09 ENCOUNTER — HOSPITAL ENCOUNTER (OUTPATIENT)
Dept: INTERVENTIONAL RADIOLOGY/VASCULAR | Facility: HOSPITAL | Age: 60
Discharge: HOME | End: 2020-04-09
Attending: FAMILY MEDICINE | Admitting: RADIOLOGY
Payer: MEDICAID

## 2020-04-09 ENCOUNTER — HOSPITAL ENCOUNTER (OUTPATIENT)
Facility: HOSPITAL | Age: 60
Discharge: HOME | End: 2020-04-09
Attending: RADIOLOGY | Admitting: RADIOLOGY
Payer: MEDICAID

## 2020-04-09 VITALS — DIASTOLIC BLOOD PRESSURE: 72 MMHG | SYSTOLIC BLOOD PRESSURE: 136 MMHG

## 2020-04-09 DIAGNOSIS — R18.8 OTHER ASCITES: ICD-10-CM

## 2020-04-09 PROCEDURE — 49083 ABD PARACENTESIS W/IMAGING: CPT | Mod: ,,, | Performed by: RADIOLOGY

## 2020-04-09 PROCEDURE — 49083 ABD PARACENTESIS W/IMAGING: CPT

## 2020-04-09 PROCEDURE — 49083 IR PARACENTESIS WITH IMAGING: ICD-10-PCS | Mod: ,,, | Performed by: RADIOLOGY

## 2020-04-09 PROCEDURE — A7048 VACUUM DRAIN BOTTLE/TUBE KIT: HCPCS

## 2020-04-09 NOTE — DISCHARGE SUMMARY
Radiology Discharge Summary      Hospital Course: No complications    Admit Date: 4/9/2020  Discharge Date: 04/09/2020     Instructions Given to Patient: Yes  Diet: Resume prior diet  Activity: activity as tolerated    Description of Condition on Discharge: Stable  Vital Signs (Most Recent):      Discharge Disposition: Home    Discharge Diagnosis: CHF, CKD Stage 3, recurrent ascites s/p paracentesis     Follow-up: with primary MD Vin Kong MD  Staff Radiologist  Department of Radiology  Pager: [phone]

## 2020-04-09 NOTE — PROCEDURES
Radiology Post-Procedure Note    Pre Op Diagnosis: Ascites, CKD Stage 3, CHF  Post Op Diagnosis: Same    Procedure: Paracentesis    Procedure performed by: Vin Kong MD    Written Informed Consent Obtained: Yes  Specimen Removed: YES thin yellow fluid  Estimated Blood Loss: Minimal    Findings:   Successful paracentesis.  Albumin administered PRN per protocol.    Patient tolerated procedure well.    Vin Kong MD  Staff Radiologist  Department of Radiology  Pager: [phone]

## 2020-04-09 NOTE — H&P
Radiology History & Physical      SUBJECTIVE:     Chief Complaint: recurrent ascites    History of Present Illness:  Marvin Ray is a 60 y.o. male who presents for paracentesis  Past Medical History:   Diagnosis Date    Arthritis     Cellulitis     CKD (chronic kidney disease), stage III     Coronary artery disease     Diabetes mellitus     Diabetic retinopathy     Diabetic ulcer of left foot     Glaucoma     Gout     Hyperlipemia     Hypertension     ICD (implantable cardioverter-defibrillator) in place 11/02/2018    Left chest    Non-pressure chronic ulcer of other part of left foot with fat layer exposed 10/23/2018    PVD (peripheral vascular disease)     Type 2 diabetes mellitus with left diabetic foot ulcer 10/29/2018    Unsteady gait     uses a wheelchair     Past Surgical History:   Procedure Laterality Date    AHMED GLAUCOMA IMPLANT Left 2011    DONE AT [facility]    AORTOGRAPHY WITH SERIALOGRAPHY Left 4/30/2019    Procedure: AORTOGRAM, WITH SERIALOGRAPHY, LEFT LOWER EXTREMITY, POSSIBLE INTERVENTION;  Surgeon: Mitch Chan MD;  Location: [facility] OR;  Service: Vascular;  Laterality: Left;  RN PRE OP 4-23-19.  NO H & P, No Cosent  [place]    BAERVELDT GLAUCOMA IMPLANT Left 2012    WITH CATARACT EXTRACTION//DONE AT [facility]    CARDIAC CATHETERIZATION Left 05/2016    CARDIAC DEFIBRILLATOR PLACEMENT Left 11/02/2018    CATARACT EXTRACTION W/  INTRAOCULAR LENS IMPLANT Left 2012    WITH BAERVEDT//DONE AT [facility]    CATARACT EXTRACTION W/  INTRAOCULAR LENS IMPLANT Right 09/26/2018    COMPLEX ()    CB DESTRUCTION WITH CYCLO G6 Left 02/15/2017        CYST REMOVAL      DEBRIDEMENT OF FOOT  12/28/2018    Procedure: DEBRIDEMENT, FOOT;  Surgeon: Mitch Chan MD;  Location: [facility] OR;  Service: Vascular;;    DEBRIDEMENT OF FOOT  6/5/2019    Procedure: DEBRIDEMENT, FOOT;  Surgeon: Mitch Chan MD;  Location: [facility] OR;  Service: Vascular;;    EXAMINATION  UNDER ANESTHESIA Left 12/5/2018    Procedure: Exam under anesthesia, left foot debridement, washout and all other indicated procedures;  Surgeon: Mitch Wall MD;  Location: [facility] OR;  Service: Vascular;  Laterality: Left;  1030AM START  RN PREOP 12/3/2018-----AICD  SEEN BY DR JAMES 12/4    EXAMINATION UNDER ANESTHESIA Left 2/13/2019    Procedure: LEFT FOOT WOUND DEBRIDEMENT, WASHOUT AND ALL OTHER INDICATED PROCEDURES;  Surgeon: Mitch Wall MD;  Location: [facility] OR;  Service: Vascular;  Laterality: Left;  requesting 1st case start  THIS CASE 1ST PER DR WALL ON 2/1/19 @ 844AM -[place]  RN PREOP 2/6/2019  HAS CARDIAC CLEARANCE    EXAMINATION UNDER ANESTHESIA Left 12/28/2018    Procedure: Exam under anesthesia, left foot debridement, left foot washout, possible left second through fifth metatarsal resection;  Surgeon: Mitch Wall MD;  Location: [facility] OR;  Service: Vascular;  Laterality: Left;  1:00pm start per julissa @ 8:58am  RN  PHONE PRE OP 12-27-18--=Pt coming from [facility]  NEED CONSENT    EXAMINATION UNDER ANESTHESIA Left 6/5/2019    Procedure: LEFT FOOT DEBRIDEMENT, WASHOUT AND ALL OTHER INDICATED PROCEDURES;  Surgeon: Mitch Wall MD;  Location: [facility] OR;  Service: Vascular;  Laterality: Left;  RN PRE OP 5-31-19------ICD------NEED CONSENT    INCISION AND DRAINAGE Left 11/14/2018    Procedure: Incision and Drainage, left lower extremity debridement, washout;  Surgeon: Mitch Wall MD;  Location: [facility] OR;  Service: Vascular;  Laterality: Left;    INCISION AND DRAINAGE Left 11/16/2018    Procedure: INCISION AND DRAINAGE;  Surgeon: Mitch Wall MD;  Location: [facility] OR;  Service: Vascular;  Laterality: Left;    INTRAOCULAR PROSTHESES INSERTION Right 9/26/2018    Procedure: INSERTION, IOL PROSTHESIS;  Surgeon: Perla Cortés MD;  Location: [facility] OR [address] [place] [address];  Service: Ophthalmology;  Laterality: Right;    PERITONEOCENTESIS N/A 3/1/2019    Procedure:  PARACENTESIS, ABDOMINAL, INITIAL;  Surgeon: Dosc Diagnostic Provider;  Location: [facility] OR;  Service: Radiology;  Laterality: N/A;    PERITONEOCENTESIS N/A 3/25/2019    Procedure: PARACENTESIS, ABDOMINAL, INITIAL;  Surgeon: Dosc Diagnostic Provider;  Location: [facility] OR;  Service: Radiology;  Laterality: N/A;    PERITONEOCENTESIS N/A 7/22/2019    Procedure: PARACENTESIS, ABDOMINAL, INITIAL;  Surgeon: Dosc Diagnostic Provider;  Location: [facility] OR;  Service: Radiology;  Laterality: N/A;    PERITONEOCENTESIS N/A 8/16/2019    Procedure: PARACENTESIS, ABDOMINAL, INITIAL;  Surgeon: Dosc Diagnostic Provider;  Location: [facility] OR;  Service: Radiology;  Laterality: N/A;    PERITONEOCENTESIS N/A 9/26/2019    Procedure: PARACENTESIS, ABDOMINAL;  Surgeon: Dosc Diagnostic Provider;  [facility]: [facility];  Service: Radiology;  Laterality: N/A;    PERITONEOCENTESIS N/A 10/11/2019    Procedure: PARACENTESIS, ABDOMINAL;  Surgeon: Dosc Diagnostic Provider;  [facility]: [facility];  Service: Radiology;  Laterality: N/A;    PERITONEOCENTESIS N/A 10/31/2019    Procedure: PARACENTESIS, ABDOMINAL;  Surgeon: Dosc Diagnostic Provider;  [facility]: [facility];  Service: Radiology;  Laterality: N/A;    PERITONEOCENTESIS N/A 11/18/2019    Procedure: PARACENTESIS, ABDOMINAL;  Surgeon: Dosc Diagnostic Provider;  [facility];  Service: Radiology;  Laterality: N/A;    PERITONEOCENTESIS N/A 12/16/2019    Procedure: PARACENTESIS, ABDOMINAL;  Surgeon: Dosc Diagnostic Provider;  [facility]: [facility];  Service: Radiology;  Laterality: N/A;  2PM START    PERITONEOCENTESIS N/A 2/7/2020    Procedure: PARACENTESIS, ABDOMINAL;  Surgeon: Dosc Diagnostic Provider;  Location: [facility] OR;  Service: Radiology;  Laterality: N/A;  REQUESTED 10:30A START    PERITONEOCENTESIS N/A 2/19/2020    Procedure: PARACENTESIS, ABDOMINAL;  Surgeon: Dosc Diagnostic Provider;  Location: [facility] OR;  Service: Radiology;  Laterality: N/A;    PERITONEOCENTESIS N/A 2/28/2020    Procedure:  PARACENTESIS, ABDOMINAL;  Surgeon: Dosc Diagnostic Provider;  Location: [facility] OR;  Service: Radiology;  Laterality: N/A;  REQUESTED 10AM START    PHACOEMULSIFICATION OF CATARACT Right 9/26/2018    Procedure: PHACOEMULSIFICATION, CATARACT;  Surgeon: Perla Cortés MD;  [facility];  Service: Ophthalmology;  Laterality: Right;    REPEAT ABDOMINAL PARACENTESIS N/A 2/11/2019    Procedure: PARACENTESIS, ABDOMINAL, REPEAT;  Surgeon: Dosc Diagnostic Provider;  Location: [facility] OR;  Service: Radiology;  Laterality: N/A;  1PM START    REPEAT ABDOMINAL PARACENTESIS N/A 9/12/2019    Procedure: PARACENTESIS, ABDOMINAL, REPEAT;  Surgeon: Dosc Diagnostic Provider;  Location: [facility] OR;  Service: Radiology;  Laterality: N/A;  9AM START    REPEAT ABDOMINAL PARACENTESIS N/A 12/2/2019    Procedure: PARACENTESIS, ABDOMINAL, REPEAT;  Surgeon: Dosc Diagnostic Provider;  Location: [facility] OR;  Service: Radiology;  Laterality: N/A;  3PM START    REPEAT ABDOMINAL PARACENTESIS N/A 1/10/2020    Procedure: PARACENTESIS, ABDOMINAL, REPEAT;  Surgeon: Dosc Diagnostic Provider;  Location: [facility] OR;  Service: Radiology;  Laterality: N/A;  1130AM START    REPEAT ABDOMINAL PARACENTESIS N/A 1/20/2020    Procedure: PARACENTESIS, ABDOMINAL, REPEAT;  Surgeon: Dos Diagnostic Provider;  Location: [facility] OR;  Service: Radiology;  Laterality: N/A;  1PM START    REPEAT ABDOMINAL PARACENTESIS N/A 1/31/2020    Procedure: PARACENTESIS, ABDOMINAL, REPEAT;  Surgeon: Dosc Diagnostic Provider;  Location: [facility] OR;  Service: Radiology;  Laterality: N/A;  10AM START    REPEAT ABDOMINAL PARACENTESIS N/A 3/16/2020    Procedure: PARACENTESIS, ABDOMINAL, REPEAT;  Surgeon: Dosc Diagnostic Provider;  Location: [facility] OR;  Service: Radiology;  Laterality: N/A;  10AM START    REPEAT ABDOMINAL PARACENTESIS N/A 3/30/2020    Procedure: PARACENTESIS, ABDOMINAL, REPEAT;  Surgeon: Dosc Diagnostic Provider;  Location: [facility] OR;  Service: Radiology;  Laterality: N/A;     REVISION OF PROCEDURE INVOLVING GLAUCOMA DRAINAGE DEVICE Left 10/2/2019    EXTRUDING BAERVELDT /WITH PERICARDIAL PATCH GRAFT ()    Right foot surgery  10/2014    TOE AMPUTATION Right     first and second    TOE AMPUTATION Left 11/16/2018    Procedure: AMPUTATION, TOES  2-5;  Surgeon: Mitch Chan MD;  Location: [facility] OR;  Service: Vascular;  Laterality: Left;    TOE AMPUTATION Left 12/5/2018    Procedure: AMPUTATION, TOE;  Surgeon: Mitch Chan MD;  Location: [facility] OR;  Service: Vascular;  Laterality: Left;  left great toe    TONSILLECTOMY         Home Meds:   Prior to Admission medications    Medication Sig Start Date End Date Taking? Authorizing Provider   allopurinoL (ZYLOPRIM) 300 MG tablet TAKE 1 TABLET(300 MG) BY MOUTH EVERY DAY 2/23/20   Rubén Davis MD   aspirin (ECOTRIN) 81 MG EC tablet Take 81 mg by mouth once daily.    Historical Provider, MD   bisacodyl (DULCOLAX) 5 mg EC tablet Take 5 mg by mouth daily as needed for Constipation.    Historical Provider, MD   brimonidine 0.2% (ALPHAGAN) 0.2 % Drop Place 1 drop into both eyes 3 (three) times daily. 12/12/19   Perla Cortés MD   carvediloL (COREG) 3.125 MG tablet Take 1 tablet (3.125 mg total) by mouth 2 (two) times daily. 2/26/20 2/25/21  Rubén Davis MD   coenzyme Q10 100 mg capsule Take 100 mg by mouth every morning.    Historical Provider, MD   dorzolamide-timolol 2-0.5% (COSOPT) 22.3-6.8 mg/mL ophthalmic solution Place 1 drop into both eyes 3 (three) times daily. 12/12/19   Perla Cortés MD   ezetimibe (ZETIA) 10 mg tablet TAKE 1 TABLET(10 MG) BY MOUTH EVERY DAY 1/24/20   Rubén Davis MD   fish oil-omega-3 fatty acids 300-1,000 mg capsule Take 1 capsule by mouth 2 (two) times daily. 1/23/19   Sara Ching MD   gabapentin (NEURONTIN) 100 MG capsule TAKE 2 CAPSULES(200 MG) BY MOUTH EVERY EVENING 3/30/20   Rubén Davis MD   insulin degludec-liraglutide (XULTOPHY 100/3.6) 100 unit-3.6 mg  "/mL (3 mL) InPn Inject 34 Units into the skin once daily. 1/28/20   Sheryl Madison NP   MULTIVIT,THER IRON,CA,FA & MIN (MULTIVITAMIN) Tab Take 1 tablet by mouth every morning.     Historical Provider, MD   pen needle, diabetic 31 gauge x 1/4" Ndle Use as directed 8/11/16   Mike Bass MD   torsemide (DEMADEX) 20 MG Tab Take 4 tablets (80 mg total) by mouth 2 (two) times daily. 2/26/20 3/27/20  Rubén Davis MD     Anticoagulants/Antiplatelets: aspirin    Allergies:   Review of patient's allergies indicates:   Allergen Reactions    Statins-hmg-coa reductase inhibitors      Generalized Pain    Onglyza [saxagliptin]     Penicillins Rash     Sedation History:  no adverse reactions    Review of Systems:   Hematological: no known coagulopathies  Respiratory: no shortness of breath  Cardiovascular: no chest pain  Gastrointestinal: no abdominal pain  Genito-Urinary: no dysuria  Musculoskeletal: negative  Neurological: no TIA or stroke symptoms         OBJECTIVE:     Vital Signs (Most Recent)       Physical Exam:  ASA: 2  Mallampati: N/A    General: no acute distress  Mental Status: alert and oriented to person, place and time  HEENT: normocephalic, atraumatic  Chest: unlabored breathing  Heart: regular heart rate  Abdomen: nondistended  Extremity: moves all extremities    Laboratory  Lab Results   Component Value Date    INR 1.1 05/31/2019       Lab Results   Component Value Date    WBC 5.13 08/22/2019    HGB 9.9 (L) 08/22/2019    HCT 32.0 (L) 08/22/2019    MCV 86 08/22/2019     08/22/2019      Lab Results   Component Value Date    GLU 83 08/22/2019     08/22/2019    K 3.6 08/22/2019     08/22/2019    CO2 28 08/22/2019    BUN 39 (H) 08/22/2019    CREATININE 1.1 08/22/2019    CALCIUM 8.8 08/22/2019    MG 2.1 05/31/2019    ALT 12 08/22/2019    AST 14 08/22/2019    ALBUMIN 2.8 (L) 08/22/2019    BILITOT 0.5 08/22/2019       ASSESSMENT/PLAN:     Sedation Plan: local anesthesis  Patient will undergo " paracentesis.    Vin Kong MD  Staff Radiologist  Department of Radiology  Pager: [phone]

## 2020-04-16 DIAGNOSIS — E78.5 HYPERLIPIDEMIA LDL GOAL <70: ICD-10-CM

## 2020-04-16 RX ORDER — EZETIMIBE 10 MG/1
TABLET ORAL
Qty: 90 TABLET | Refills: 0 | Status: SHIPPED | OUTPATIENT
Start: 2020-04-16 | End: 2020-07-16

## 2020-04-24 ENCOUNTER — HOSPITAL ENCOUNTER (OUTPATIENT)
Dept: INTERVENTIONAL RADIOLOGY/VASCULAR | Facility: HOSPITAL | Age: 60
Discharge: HOME | End: 2020-04-24
Attending: FAMILY MEDICINE
Payer: MEDICAID

## 2020-04-24 VITALS — DIASTOLIC BLOOD PRESSURE: 64 MMHG | SYSTOLIC BLOOD PRESSURE: 128 MMHG

## 2020-04-24 DIAGNOSIS — R18.8 OTHER ASCITES: ICD-10-CM

## 2020-04-24 LAB — SARS-COV-2 RDRP RESP QL NAA+PROBE: NEGATIVE

## 2020-04-24 PROCEDURE — U0002 COVID-19 LAB TEST NON-CDC: HCPCS

## 2020-04-24 PROCEDURE — 49083 IR PARACENTESIS WITH IMAGING: ICD-10-PCS | Mod: ,,, | Performed by: RADIOLOGY

## 2020-04-24 PROCEDURE — 49083 ABD PARACENTESIS W/IMAGING: CPT | Mod: ,,, | Performed by: RADIOLOGY

## 2020-04-24 PROCEDURE — 49083 ABD PARACENTESIS W/IMAGING: CPT

## 2020-04-24 RX ORDER — CARVEDILOL 3.12 MG/1
TABLET ORAL
Qty: 60 TABLET | Refills: 1 | Status: SHIPPED | OUTPATIENT
Start: 2020-04-24 | End: 2020-06-21

## 2020-04-24 NOTE — H&P
Radiology History & Physical      SUBJECTIVE:     Chief Complaint: recurrent ascites    History of Present Illness:  Marvin Ray is a 60 y.o. male who presents for paracentesis  Past Medical History:   Diagnosis Date    Arthritis     Cellulitis     CKD (chronic kidney disease), stage III     Coronary artery disease     Diabetes mellitus     Diabetic retinopathy     Diabetic ulcer of left foot     Glaucoma     Gout     Hyperlipemia     Hypertension     ICD (implantable cardioverter-defibrillator) in place 11/02/2018    Left chest    Non-pressure chronic ulcer of other part of left foot with fat layer exposed 10/23/2018    PVD (peripheral vascular disease)     Type 2 diabetes mellitus with left diabetic foot ulcer 10/29/2018    Unsteady gait     uses a wheelchair     Past Surgical History:   Procedure Laterality Date    AHMED GLAUCOMA IMPLANT Left 2011    DONE AT [facility]    AORTOGRAPHY WITH SERIALOGRAPHY Left 4/30/2019    Procedure: AORTOGRAM, WITH SERIALOGRAPHY, LEFT LOWER EXTREMITY, POSSIBLE INTERVENTION;  Surgeon: Mitch Chan MD;  Location: [facility] OR;  Service: Vascular;  Laterality: Left;  RN PRE OP 4-23-19.  NO H & P, No Cosent  [place]    BAERVELDT GLAUCOMA IMPLANT Left 2012    WITH CATARACT EXTRACTION//DONE AT [facility]    CARDIAC CATHETERIZATION Left 05/2016    CARDIAC DEFIBRILLATOR PLACEMENT Left 11/02/2018    CATARACT EXTRACTION W/  INTRAOCULAR LENS IMPLANT Left 2012    WITH BAERVEDT//DONE AT [facility]    CATARACT EXTRACTION W/  INTRAOCULAR LENS IMPLANT Right 09/26/2018    COMPLEX ()    CB DESTRUCTION WITH CYCLO G6 Left 02/15/2017        CYST REMOVAL      DEBRIDEMENT OF FOOT  12/28/2018    Procedure: DEBRIDEMENT, FOOT;  Surgeon: Mitch Chan MD;  Location: [facility] OR;  Service: Vascular;;    DEBRIDEMENT OF FOOT  6/5/2019    Procedure: DEBRIDEMENT, FOOT;  Surgeon: Mitch Chan MD;  Location: [facility] OR;  Service: Vascular;;    EXAMINATION  UNDER ANESTHESIA Left 12/5/2018    Procedure: Exam under anesthesia, left foot debridement, washout and all other indicated procedures;  Surgeon: Mitch Wall MD;  Location: [facility] OR;  Service: Vascular;  Laterality: Left;  1030AM START  RN PREOP 12/3/2018-----AICD  SEEN BY DR JAMES 12/4    EXAMINATION UNDER ANESTHESIA Left 2/13/2019    Procedure: LEFT FOOT WOUND DEBRIDEMENT, WASHOUT AND ALL OTHER INDICATED PROCEDURES;  Surgeon: Mtich Wall MD;  Location: [facility] OR;  Service: Vascular;  Laterality: Left;  requesting 1st case start  THIS CASE 1ST PER DR WALL ON 2/1/19 @ 844AM -LO  RN PREOP 2/6/2019  HAS CARDIAC CLEARANCE    EXAMINATION UNDER ANESTHESIA Left 12/28/2018    Procedure: Exam under anesthesia, left foot debridement, left foot washout, possible left second through fifth metatarsal resection;  Surgeon: Mitch Wall MD;  Location: [facility] OR;  Service: Vascular;  Laterality: Left;  1:00pm start per julissa @ 8:58am  RN  PHONE PRE OP 12-27-18--=Pt coming [facility]  NEED CONSENT    EXAMINATION UNDER ANESTHESIA Left 6/5/2019    Procedure: LEFT FOOT DEBRIDEMENT, WASHOUT AND ALL OTHER INDICATED PROCEDURES;  Surgeon: Mitch Wall MD;  Location: [facility] OR;  Service: Vascular;  Laterality: Left;  RN PRE OP 5-31-19------ICD------NEED CONSENT    INCISION AND DRAINAGE Left 11/14/2018    Procedure: Incision and Drainage, left lower extremity debridement, washout;  Surgeon: Mitch Wall MD;  Location: [facility] OR;  Service: Vascular;  Laterality: Left;    INCISION AND DRAINAGE Left 11/16/2018    Procedure: INCISION AND DRAINAGE;  Surgeon: Mitch Wall MD;  Location: [facility] OR;  Service: Vascular;  Laterality: Left;    INTRAOCULAR PROSTHESES INSERTION Right 9/26/2018    Procedure: INSERTION, IOL PROSTHESIS;  Surgeon: Perla Cortés MD;  Location: [facility] OR [address] [place] [address];  Service: Ophthalmology;  Laterality: Right;    PERITONEOCENTESIS N/A 3/1/2019    Procedure:  PARACENTESIS, ABDOMINAL, INITIAL;  Surgeon: Dosc Diagnostic Provider;  Location: [facility] OR;  Service: Radiology;  Laterality: N/A;    PERITONEOCENTESIS N/A 3/25/2019    Procedure: PARACENTESIS, ABDOMINAL, INITIAL;  Surgeon: Dosc Diagnostic Provider;  Location: [facility] OR;  Service: Radiology;  Laterality: N/A;    PERITONEOCENTESIS N/A 7/22/2019    Procedure: PARACENTESIS, ABDOMINAL, INITIAL;  Surgeon: Dosc Diagnostic Provider;  Location: [facility] OR;  Service: Radiology;  Laterality: N/A;    PERITONEOCENTESIS N/A 8/16/2019    Procedure: PARACENTESIS, ABDOMINAL, INITIAL;  Surgeon: Dosc Diagnostic Provider;  Location: [facility] OR;  Service: Radiology;  Laterality: N/A;    PERITONEOCENTESIS N/A 9/26/2019    Procedure: PARACENTESIS, ABDOMINAL;  Surgeon: Dosc Diagnostic Provider;  Location: [facility] OR;  Service: Radiology;  Laterality: N/A;    PERITONEOCENTESIS N/A 10/11/2019    Procedure: PARACENTESIS, ABDOMINAL;  Surgeon: Dosc Diagnostic Provider;  Location: [facility] OR;  Service: Radiology;  Laterality: N/A;    PERITONEOCENTESIS N/A 10/31/2019    Procedure: PARACENTESIS, ABDOMINAL;  Surgeon: Dosc Diagnostic Provider;  Location: [facility] OR;  Service: Radiology;  Laterality: N/A;    PERITONEOCENTESIS N/A 11/18/2019    Procedure: PARACENTESIS, ABDOMINAL;  Surgeon: Dosc Diagnostic Provider;  [facility]: [facility] OR;  Service: Radiology;  Laterality: N/A;    PERITONEOCENTESIS N/A 12/16/2019    Procedure: PARACENTESIS, ABDOMINAL;  Surgeon: Dosc Diagnostic Provider;  Location: [facility] OR;  Service: Radiology;  Laterality: N/A;  2PM START    PERITONEOCENTESIS N/A 2/7/2020    Procedure: PARACENTESIS, ABDOMINAL;  Surgeon: Dosc Diagnostic Provider;  Location: [facility] OR;  Service: Radiology;  Laterality: N/A;  REQUESTED 10:30A START    PERITONEOCENTESIS N/A 2/19/2020    Procedure: PARACENTESIS, ABDOMINAL;  Surgeon: Dosc Diagnostic Provider;  Location: [facility] OR;  Service: Radiology;  Laterality: N/A;    PERITONEOCENTESIS N/A 2/28/2020    Procedure:  PARACENTESIS, ABDOMINAL;  Surgeon: Dosc Diagnostic Provider;  Location: [facility] OR;  Service: Radiology;  Laterality: N/A;  REQUESTED 10AM START    PHACOEMULSIFICATION OF CATARACT Right 9/26/2018    Procedure: PHACOEMULSIFICATION, CATARACT;  Surgeon: Perla Cortés MD;  [facility];  Service: Ophthalmology;  Laterality: Right;    REPEAT ABDOMINAL PARACENTESIS N/A 2/11/2019    Procedure: PARACENTESIS, ABDOMINAL, REPEAT;  Surgeon: Dosc Diagnostic Provider;  Location: [facility] OR;  Service: Radiology;  Laterality: N/A;  1PM START    REPEAT ABDOMINAL PARACENTESIS N/A 9/12/2019    Procedure: PARACENTESIS, ABDOMINAL, REPEAT;  Surgeon: Dosc Diagnostic Provider;  Location: [facility] OR;  Service: Radiology;  Laterality: N/A;  9AM START    REPEAT ABDOMINAL PARACENTESIS N/A 12/2/2019    Procedure: PARACENTESIS, ABDOMINAL, REPEAT;  Surgeon: Dosc Diagnostic Provider;  Location: [facility] OR;  Service: Radiology;  Laterality: N/A;  3PM START    REPEAT ABDOMINAL PARACENTESIS N/A 1/10/2020    Procedure: PARACENTESIS, ABDOMINAL, REPEAT;  Surgeon: Dosc Diagnostic Provider;  Location: [facility] OR;  Service: Radiology;  Laterality: N/A;  1130AM START    REPEAT ABDOMINAL PARACENTESIS N/A 1/20/2020    Procedure: PARACENTESIS, ABDOMINAL, REPEAT;  Surgeon: Dos Diagnostic Provider;  Location: [facility] OR;  Service: Radiology;  Laterality: N/A;  1PM START    REPEAT ABDOMINAL PARACENTESIS N/A 1/31/2020    Procedure: PARACENTESIS, ABDOMINAL, REPEAT;  Surgeon: Dosc Diagnostic Provider;  Location: [facility] OR;  Service: Radiology;  Laterality: N/A;  10AM START    REPEAT ABDOMINAL PARACENTESIS N/A 3/16/2020    Procedure: PARACENTESIS, ABDOMINAL, REPEAT;  Surgeon: Dosc Diagnostic Provider;  Location: [facility] OR;  Service: Radiology;  Laterality: N/A;  10AM START    REPEAT ABDOMINAL PARACENTESIS N/A 3/30/2020    Procedure: PARACENTESIS, ABDOMINAL, REPEAT;  Surgeon: Dosc Diagnostic Provider;  Location: [facility] OR;  Service: Radiology;  Laterality: N/A;     REPEAT ABDOMINAL PARACENTESIS N/A 4/9/2020    Procedure: PARACENTESIS, ABDOMINAL, REPEAT;  Surgeon: Jovanic Diagnostic Provider;  Location: [facility] OR;  Service: Radiology;  Laterality: N/A;  1130AM START    REVISION OF PROCEDURE INVOLVING GLAUCOMA DRAINAGE DEVICE Left 10/2/2019    EXTRUDING BAERVELDT /WITH PERICARDIAL PATCH GRAFT ()    Right foot surgery  10/2014    TOE AMPUTATION Right     first and second    TOE AMPUTATION Left 11/16/2018    Procedure: AMPUTATION, TOES  2-5;  Surgeon: Mitch Chan MD;  Location: [facility] OR;  Service: Vascular;  Laterality: Left;    TOE AMPUTATION Left 12/5/2018    Procedure: AMPUTATION, TOE;  Surgeon: Mitch Chan MD;  Location: [facility] OR;  Service: Vascular;  Laterality: Left;  left great toe    TONSILLECTOMY         Home Meds:   Prior to Admission medications    Medication Sig Start Date End Date Taking? Authorizing Provider   allopurinoL (ZYLOPRIM) 300 MG tablet TAKE 1 TABLET(300 MG) BY MOUTH EVERY DAY 2/23/20   Rubén Davis MD   aspirin (ECOTRIN) 81 MG EC tablet Take 81 mg by mouth once daily.    Historical Provider, MD   bisacodyl (DULCOLAX) 5 mg EC tablet Take 5 mg by mouth daily as needed for Constipation.    Historical Provider, MD   brimonidine 0.2% (ALPHAGAN) 0.2 % Drop Place 1 drop into both eyes 3 (three) times daily. 12/12/19   Perla Cortés MD   carvediloL (COREG) 3.125 MG tablet TAKE 1 TABLET(3.125 MG) BY MOUTH TWICE DAILY 4/24/20   Rubén Davis MD   coenzyme Q10 100 mg capsule Take 100 mg by mouth every morning.    Historical Provider, MD   dorzolamide-timolol 2-0.5% (COSOPT) 22.3-6.8 mg/mL ophthalmic solution Place 1 drop into both eyes 3 (three) times daily. 12/12/19   Perla Cortés MD   ezetimibe (ZETIA) 10 mg tablet TAKE 1 TABLET(10 MG) BY MOUTH EVERY DAY 4/16/20   Rubén Davis MD   fish oil-omega-3 fatty acids 300-1,000 mg capsule Take 1 capsule by mouth 2 (two) times daily. 1/23/19   Sara Ching MD  "  gabapentin (NEURONTIN) 100 MG capsule TAKE 2 CAPSULES(200 MG) BY MOUTH EVERY EVENING 3/30/20   Rubén Davis MD   insulin degludec-liraglutide (XULTOPHY 100/3.6) 100 unit-3.6 mg /mL (3 mL) InPn Inject 34 Units into the skin once daily. 1/28/20   Sheryl Madison NP   MULTIVIT,THER IRON,CA,FA & MIN (MULTIVITAMIN) Tab Take 1 tablet by mouth every morning.     Historical Provider, MD   pen needle, diabetic 31 gauge x 1/4" Ndle Use as directed 8/11/16   Mike Bass MD   torsemide (DEMADEX) 20 MG Tab Take 4 tablets (80 mg total) by mouth 2 (two) times daily. 2/26/20 3/27/20  Rubén Davis MD   carvediloL (COREG) 3.125 MG tablet Take 1 tablet (3.125 mg total) by mouth 2 (two) times daily. 2/26/20 4/24/20  Rubén Davis MD     Anticoagulants/Antiplatelets: aspirin    Allergies:   Review of patient's allergies indicates:   Allergen Reactions    Statins-hmg-coa reductase inhibitors      Generalized Pain    Onglyza [saxagliptin]     Penicillins Rash     Sedation History:  no adverse reactions    Review of Systems:   Hematological: no known coagulopathies  Respiratory: no shortness of breath  Cardiovascular: no chest pain  Gastrointestinal: no abdominal pain  Genito-Urinary: no dysuria  Musculoskeletal: negative  Neurological: no TIA or stroke symptoms         OBJECTIVE:     Vital Signs (Most Recent)  BP: 133/74 (04/24/20 1350)    Physical Exam:  ASA: 2  Mallampati: N/A    General: no acute distress  Mental Status: alert and oriented to person, place and time  HEENT: normocephalic, atraumatic  Chest: unlabored breathing  Heart: regular heart rate  Abdomen: distended  Extremity: moves all extremities    Laboratory  Lab Results   Component Value Date    INR 1.1 05/31/2019       Lab Results   Component Value Date    WBC 5.13 08/22/2019    HGB 9.9 (L) 08/22/2019    HCT 32.0 (L) 08/22/2019    MCV 86 08/22/2019     08/22/2019      Lab Results   Component Value Date    GLU 83 08/22/2019     08/22/2019    " K 3.6 08/22/2019     08/22/2019    CO2 28 08/22/2019    BUN 39 (H) 08/22/2019    CREATININE 1.1 08/22/2019    CALCIUM 8.8 08/22/2019    MG 2.1 05/31/2019    ALT 12 08/22/2019    AST 14 08/22/2019    ALBUMIN 2.8 (L) 08/22/2019    BILITOT 0.5 08/22/2019       ASSESSMENT/PLAN:     Sedation Plan: local anesthesia  Patient will undergo US guided paracentesis.    Vin Kong MD  Staff Radiologist  Department of Radiology  Pager: [phone]

## 2020-04-24 NOTE — PROCEDURES
Radiology Post-Procedure Note    Pre Op Diagnosis: Ascites, CKD 3, CHF  Post Op Diagnosis: Same    Procedure: Paracentesis    Procedure performed by: Vin Kong MD    Written Informed Consent Obtained: Yes  Specimen Removed: YES thin yellow fluid  Estimated Blood Loss: Minimal    Findings:   Successful paracentesis.  Albumin administered PRN per protocol.    Patient tolerated procedure well.    Vin Kong MD  Staff Radiologist  Department of Radiology  Pager: [phone]

## 2020-04-24 NOTE — DISCHARGE SUMMARY
Radiology Discharge Summary      Hospital Course: No complications    Admit Date: 4/24/2020  Discharge Date: 04/24/2020     Instructions Given to Patient: Yes  Diet: Resume prior diet  Activity: activity as tolerated    Description of Condition on Discharge: Stable  Vital Signs (Most Recent): BP: 128/64 (04/24/20 1420)    Discharge Disposition: Home    Discharge Diagnosis: CHF, CKD, ascites s/p paracentesis     Follow-up: with primary MD Vin Kong MD  Staff Radiologist  Department of Radiology  Pager: [phone]

## 2020-04-24 NOTE — PROGRESS NOTES
Paracentesis completed.4400 ml removed from right lateral abdomen. Site dressed with bandaid, CDI, no redness, swelling or hematoma noted. Tolerated well. VSS. NADN. Verbalizes understanding of discharge instructions. Next appointment scheduled prior to discharge. Family present at time of discharge to drive patient home.

## 2020-05-01 ENCOUNTER — PATIENT OUTREACH (OUTPATIENT)
Dept: ADMINISTRATIVE | Facility: OTHER | Age: 60
End: 2020-05-01

## 2020-05-07 ENCOUNTER — HOSPITAL ENCOUNTER (OUTPATIENT)
Dept: PREADMISSION TESTING | Facility: HOSPITAL | Age: 60
Discharge: HOME | End: 2020-05-07
Attending: RADIOLOGY
Payer: MEDICAID

## 2020-05-07 DIAGNOSIS — Z01.818 PREOP TESTING: ICD-10-CM

## 2020-05-07 LAB — SARS-COV-2 RDRP RESP QL NAA+PROBE: NEGATIVE

## 2020-05-07 PROCEDURE — U0002 COVID-19 LAB TEST NON-CDC: HCPCS

## 2020-05-08 ENCOUNTER — HOSPITAL ENCOUNTER (OUTPATIENT)
Facility: HOSPITAL | Age: 60
Discharge: HOME | End: 2020-05-08
Attending: RADIOLOGY | Admitting: RADIOLOGY
Payer: MEDICAID

## 2020-05-08 VITALS
RESPIRATION RATE: 18 BRPM | SYSTOLIC BLOOD PRESSURE: 129 MMHG | TEMPERATURE: 98 F | DIASTOLIC BLOOD PRESSURE: 63 MMHG | OXYGEN SATURATION: 96 % | HEART RATE: 80 BPM

## 2020-05-08 DIAGNOSIS — Z98.890 STATUS POST ABDOMINAL PARACENTESIS: ICD-10-CM

## 2020-05-08 DIAGNOSIS — Z01.818 PREOP TESTING: Primary | ICD-10-CM

## 2020-05-08 DIAGNOSIS — R18.8 OTHER ASCITES: ICD-10-CM

## 2020-05-08 PROCEDURE — 63600175 PHARM REV CODE 636 W HCPCS: Mod: JG | Performed by: RADIOLOGY

## 2020-05-08 PROCEDURE — P9047 ALBUMIN (HUMAN), 25%, 50ML: HCPCS | Mod: JG | Performed by: RADIOLOGY

## 2020-05-08 RX ORDER — ALBUMIN HUMAN 250 G/1000ML
50 SOLUTION INTRAVENOUS ONCE
Status: COMPLETED | OUTPATIENT
Start: 2020-05-08 | End: 2020-05-08

## 2020-05-08 RX ADMIN — ALBUMIN (HUMAN) 50 G: 0.25 INJECTION, SOLUTION INTRAVENOUS at 10:05

## 2020-05-08 NOTE — DISCHARGE SUMMARY
Radiology Discharge Summary      Hospital Course: No complications    Admit Date: 5/8/2020  Discharge Date: 05/08/2020     Instructions Given to Patient: Yes  Diet: Resume prior diet  Activity: activity as tolerated    Description of Condition on Discharge: Stable  Vital Signs (Most Recent): Temp: 97.8 °F (36.6 °C) (05/08/20 1125)  Pulse: 80 (05/08/20 1125)  Resp: 18 (05/08/20 1125)  BP: 129/63 (05/08/20 1125)  SpO2: 96 % (05/08/20 1125)    Discharge Disposition: Home    Discharge Diagnosis: CHF, CKD s/p paracentesis     Follow-up: with primary MD    Vin Kong MD  Staff Radiologist  Department of Radiology  Pager: [phone]

## 2020-05-08 NOTE — PROCEDURES
Radiology Post-Procedure Note    Pre Op Diagnosis: Ascites, CHF, CKD  Post Op Diagnosis: Same    Procedure: Paracentesis    Procedure performed by: Vin Kong MD    Written Informed Consent Obtained: Yes  Specimen Removed: YES thin yellow fluid  Estimated Blood Loss: Minimal    Findings:   Successful paracentesis.  Albumin administered PRN per protocol.    Patient tolerated procedure well.    Vin Kong MD  Staff Radiologist  Department of Radiology  Pager: [phone]

## 2020-05-08 NOTE — PROGRESS NOTES
Report called to JILLIAN Corea in [facility]. Paracentesis completed. 6.6L removed from right lateral abdomen. Tolerated well. Site with bandaid, CDI, no redness, swelling or hematoma noted. EDDI LANGSTON. Transported to [facility] per RN. Next appointment scheduled before transfer.

## 2020-05-08 NOTE — DISCHARGE INSTRUCTIONS
BATHING:  ? You may shower tomorrow.  DRESSING:  ? Remove dressing tomorrow.        ACTIVITY LEVEL: If you have received sedation or an anesthetic, you may feel sleepy for several hours. Rest until you are more awake. Gradually resume your normal activities    Do not drive, drink alcohol, or sign legal documents for 24 hours, or if taking narcotic pain medication.      DIET: You may resume your home diet. If nausea is present, increase your diet gradually with fluids and bland foods.    Medications: Pain medication should be taken only if needed and as directed. If antibiotics are prescribed, the medication should be taken until completed. You will be given an updated list of you medications.  ? No driving, alcoholic beverages or signing legal documents for next 24 hours if you have had sedation, or while taking pain medication    CALL THE DOCTOR:   For any obvious bleeding (some dried blood over the incision is normal).     Redness, swelling, foul smell around incision or fever over 100.4  Shortness of breath.  Persistent pain or nausea not relieved by medication.  Call  [phone]     to speak with an Interventional Radiologist    If any unusual problems or difficulties occur contact your doctor. If you cannot contact your doctor but feel your signs and symptoms warrant a physicians attention return to the emergency room.               Discharge Instructions for Paracentesis  Paracentesis is a procedure to remove extra fluid from your belly (abdomen). This fluid buildup in the abdomen is called ascites. The procedure may have been done to take a sample of the fluid. Or, it may have been done to drain the extra fluid from your abdomen and help make you more comfortable.     Ascites is buildup of excess fluid in the abdomen.   Home care  · If you have pain after the procedure, your healthcare provider can prescribe or recommend pain medicines. Take these exactly as directed. If you stopped taking other medicines  before the procedure, ask your provider when you can start them again.  · Take it easy for 24 hours after the procedure. Avoid physical activity until your provider says its OK.  · You will have a small bandage over the puncture site. Stitches (sutures), surgical staples, adhesive tapes, adhesive strips, or surgical glue may be used to close the incision. They also help stop bleeding and speed healing. You may take the bandage off in 24 hours.  · Check the puncture site for the signs of infection listed below.  Follow-up care  Make a follow-up appointment with your healthcare provider as directed. During your follow-up visit, your provider will check your healing. Let your provider know how you are feeling. You can also discuss the cause of your ascites and if you need any further treatment.  When to seek medical advice  Call your healthcare provider if you have any of the following after the procedure:  · A fever of 100.4°F (38.0°C) or higher  · Trouble breathing  · Pain that doesn't go away even after taking pain medicine  · Belly pain not caused by having the skin punctured  · Bleeding from the puncture site  · More than a small amount of fluid leaking from the puncture site  · Swollen belly  · Signs of infection at the puncture site. These include increased pain, redness, or swelling, warmth, or bad-smelling drainage.  · Blood in your urine  · Feeling dizzy or lightheaded, or fainting   Date Last Reviewed: 7/1/2016  © 6023-5782 [employer]. [address] [place] [address]. All rights reserved. This information is not intended as a substitute for professional medical care. Always follow your healthcare professional's instructions.      Fall Prevention  Millions of people fall every year and injure themselves. You may have had anesthesia or sedation which may increase your risk of falling. You may have health issues that put you at an increased risk of falling.     Here are ways to reduce  your risk of falling.  ·   · Make your home safe by keeping walkways clear of objects you may trip over.  · Use non-slip pads under rugs. Do not use area rugs or small throw rugs.  · Use non-slip mats in bathtubs and showers.  · Install handrails and lights on staircases.  · Do not walk in poorly lit areas.  · Do not stand on chairs or wobbly ladders.  · Use caution when reaching overhead or looking upward. This position can cause a loss of balance.  · Be sure your shoes fit properly, have non-slip bottoms and are in good condition.   · Wear shoes both inside and out. Avoid going barefoot or wearing slippers.  · Be cautious when going up and down stairs, curbs, and when walking on uneven sidewalks.  · If your balance is poor, consider using a cane or walker.  · If your fall was related to alcohol use, stop or limit alcohol intake.   · If your fall was related to use of sleeping medicines, talk to your doctor about this. You may need to reduce your dosage at bedtime if you awaken during the night to go to the bathroom.    · To reduce the need for nighttime bathroom trips:  ¨ Avoid drinking fluids for several hours before going to bed  ¨ Empty your bladder before going to bed  ¨ Men can keep a urinal at the bedside  · Stay as active as you can. Balance, flexibility, strength, and endurance all come from exercise. They all play a role in preventing falls. Ask your healthcare provider which types of activity are right for you.  · Get your vision checked on a regular basis.  · If you have pets, know where they are before you stand up or walk so you don't trip over them.  · Use night lights.

## 2020-05-08 NOTE — H&P
Radiology History & Physical      SUBJECTIVE:     Chief Complaint: recurrent ascites    History of Present Illness:  Marvin Ray is a 60 y.o. male who presents for [aracentesis  Past Medical History:   Diagnosis Date    Arthritis     Cellulitis     CKD (chronic kidney disease), stage III     Coronary artery disease     Diabetes mellitus     Diabetic retinopathy     Diabetic ulcer of left foot     Glaucoma     Gout     Hyperlipemia     Hypertension     ICD (implantable cardioverter-defibrillator) in place 11/02/2018    Left chest    Non-pressure chronic ulcer of other part of left foot with fat layer exposed 10/23/2018    PVD (peripheral vascular disease)     Type 2 diabetes mellitus with left diabetic foot ulcer 10/29/2018    Unsteady gait     uses a wheelchair     Past Surgical History:   Procedure Laterality Date    AHMED GLAUCOMA IMPLANT Left 2011    DONE AT [facility]    AORTOGRAPHY WITH SERIALOGRAPHY Left 4/30/2019    Procedure: AORTOGRAM, WITH SERIALOGRAPHY, LEFT LOWER EXTREMITY, POSSIBLE INTERVENTION;  Surgeon: Mitch Chan MD;  Location: [facility] OR;  Service: Vascular;  Laterality: Left;  RN PRE OP 4-23-19.  NO H & P, No Cosent  [place]    BAERVELDT GLAUCOMA IMPLANT Left 2012    WITH CATARACT EXTRACTION//DONE AT [facility]    CARDIAC CATHETERIZATION Left 05/2016    CARDIAC DEFIBRILLATOR PLACEMENT Left 11/02/2018    CATARACT EXTRACTION W/  INTRAOCULAR LENS IMPLANT Left 2012    WITH BAERVEDT//DONE AT [facility]    CATARACT EXTRACTION W/  INTRAOCULAR LENS IMPLANT Right 09/26/2018    COMPLEX ()    CB DESTRUCTION WITH CYCLO G6 Left 02/15/2017        CYST REMOVAL      DEBRIDEMENT OF FOOT  12/28/2018    Procedure: DEBRIDEMENT, FOOT;  Surgeon: Mitch Chan MD;  Location: [facility] OR;  Service: Vascular;;    DEBRIDEMENT OF FOOT  6/5/2019    Procedure: DEBRIDEMENT, FOOT;  Surgeon: Mitch Chan MD;  Location: [facility] OR;  Service: Vascular;;    EXAMINATION  UNDER ANESTHESIA Left 12/5/2018    Procedure: Exam under anesthesia, left foot debridement, washout and all other indicated procedures;  Surgeon: Mitch Wall MD;  Location: [facility] OR;  Service: Vascular;  Laterality: Left;  1030AM START  RN PREOP 12/3/2018-----AICD  SEEN BY DR JAMES 12/4    EXAMINATION UNDER ANESTHESIA Left 2/13/2019    Procedure: LEFT FOOT WOUND DEBRIDEMENT, WASHOUT AND ALL OTHER INDICATED PROCEDURES;  Surgeon: Mitch Wall MD;  Location: [facility] OR;  Service: Vascular;  Laterality: Left;  requesting 1st case start  THIS CASE 1ST PER DR WALL ON 2/1/19 @ 844AM -[place]  RN PREOP 2/6/2019  HAS CARDIAC CLEARANCE    EXAMINATION UNDER ANESTHESIA Left 12/28/2018    Procedure: Exam under anesthesia, left foot debridement, left foot washout, possible left second through fifth metatarsal resection;  Surgeon: Mitch Wall MD;  Location: [facility] OR;  Service: Vascular;  Laterality: Left;  1:00pm start per julissa @ 8:58am  RN  PHONE PRE OP 12-27-18--=Pt coming from [facility]"" on [facility]  NEED CONSENT    EXAMINATION UNDER ANESTHESIA Left 6/5/2019    Procedure: LEFT FOOT DEBRIDEMENT, WASHOUT AND ALL OTHER INDICATED PROCEDURES;  Surgeon: Mitch Wall MD;  Location: [facility] OR;  Service: Vascular;  Laterality: Left;  RN PRE OP 5-31-19------ICD------NEED CONSENT    INCISION AND DRAINAGE Left 11/14/2018    Procedure: Incision and Drainage, left lower extremity debridement, washout;  Surgeon: Mitch Wall MD;  Location: [facility] OR;  Service: Vascular;  Laterality: Left;    INCISION AND DRAINAGE Left 11/16/2018    Procedure: INCISION AND DRAINAGE;  Surgeon: Mitch Wall MD;  Location: [facility] OR;  Service: Vascular;  Laterality: Left;    INTRAOCULAR PROSTHESES INSERTION Right 9/26/2018    Procedure: INSERTION, IOL PROSTHESIS;  Surgeon: Perla Cortés MD;  Location: [facility] OR [address] [place] [address];  Service: Ophthalmology;  Laterality: Right;    PERITONEOCENTESIS N/A 3/1/2019    Procedure:  PARACENTESIS, ABDOMINAL, INITIAL;  Surgeon: Dosc Diagnostic Provider;  [facility]: [facility] OR;  Service: Radiology;  Laterality: N/A;    PERITONEOCENTESIS N/A 3/25/2019    Procedure: PARACENTESIS, ABDOMINAL, INITIAL;  Surgeon: Dosc Diagnostic Provider;  Location: [facility] OR;  Service: Radiology;  Laterality: N/A;    PERITONEOCENTESIS N/A 7/22/2019    Procedure: PARACENTESIS, ABDOMINAL, INITIAL;  Surgeon: Dosc Diagnostic Provider;  Location: [facility] OR;  Service: Radiology;  Laterality: N/A;    PERITONEOCENTESIS N/A 8/16/2019    Procedure: PARACENTESIS, ABDOMINAL, INITIAL;  Surgeon: Dosc Diagnostic Provider;  Location: [facility] OR;  Service: Radiology;  Laterality: N/A;    PERITONEOCENTESIS N/A 9/26/2019    Procedure: PARACENTESIS, ABDOMINAL;  Surgeon: Dosc Diagnostic Provider;  [facility];  Service: Radiology;  Laterality: N/A;    PERITONEOCENTESIS N/A 10/11/2019    Procedure: PARACENTESIS, ABDOMINAL;  Surgeon: Dosc Diagnostic Provider;  [facility];  Service: Radiology;  Laterality: N/A;    PERITONEOCENTESIS N/A 10/31/2019    Procedure: PARACENTESIS, ABDOMINAL;  Surgeon: Dosc Diagnostic Provider;  [facility];  Service: Radiology;  Laterality: N/A;    PERITONEOCENTESIS N/A 11/18/2019    Procedure: PARACENTESIS, ABDOMINAL;  Surgeon: Dosc Diagnostic Provider;  [facility];  Service: Radiology;  Laterality: N/A;    PERITONEOCENTESIS N/A 12/16/2019    Procedure: PARACENTESIS, ABDOMINAL;  Surgeon: Dosc Diagnostic Provider;  [facility];  Service: Radiology;  Laterality: N/A;  2PM START    PERITONEOCENTESIS N/A 2/7/2020    Procedure: PARACENTESIS, ABDOMINAL;  Surgeon: Dosc Diagnostic Provider;  Location: [facility] OR;  Service: Radiology;  Laterality: N/A;  REQUESTED 10:30A START    PERITONEOCENTESIS N/A 2/19/2020    Procedure: PARACENTESIS, ABDOMINAL;  Surgeon: Dosc Diagnostic Provider;  Location: [facility] OR;  Service: Radiology;  Laterality: N/A;    PERITONEOCENTESIS N/A 2/28/2020    Procedure:  PARACENTESIS, ABDOMINAL;  Surgeon: Dosc Diagnostic Provider;  Location: [facility] OR;  Service: Radiology;  Laterality: N/A;  REQUESTED 10AM START    PHACOEMULSIFICATION OF CATARACT Right 9/26/2018    Procedure: PHACOEMULSIFICATION, CATARACT;  Surgeon: Perla Cortés MD;  [facility];  Service: Ophthalmology;  Laterality: Right;    REPEAT ABDOMINAL PARACENTESIS N/A 2/11/2019    Procedure: PARACENTESIS, ABDOMINAL, REPEAT;  Surgeon: Dosc Diagnostic Provider;  Location: [facility] OR;  Service: Radiology;  Laterality: N/A;  1PM START    REPEAT ABDOMINAL PARACENTESIS N/A 9/12/2019    Procedure: PARACENTESIS, ABDOMINAL, REPEAT;  Surgeon: Dosc Diagnostic Provider;  Location: [facility] OR;  Service: Radiology;  Laterality: N/A;  9AM START    REPEAT ABDOMINAL PARACENTESIS N/A 12/2/2019    Procedure: PARACENTESIS, ABDOMINAL, REPEAT;  Surgeon: Dosc Diagnostic Provider;  Location: [facility] OR;  Service: Radiology;  Laterality: N/A;  3PM START    REPEAT ABDOMINAL PARACENTESIS N/A 1/10/2020    Procedure: PARACENTESIS, ABDOMINAL, REPEAT;  Surgeon: Dosc Diagnostic Provider;  Location: [facility] OR;  Service: Radiology;  Laterality: N/A;  1130AM START    REPEAT ABDOMINAL PARACENTESIS N/A 1/20/2020    Procedure: PARACENTESIS, ABDOMINAL, REPEAT;  Surgeon: Dos Diagnostic Provider;  Location: [facility] OR;  Service: Radiology;  Laterality: N/A;  1PM START    REPEAT ABDOMINAL PARACENTESIS N/A 1/31/2020    Procedure: PARACENTESIS, ABDOMINAL, REPEAT;  Surgeon: Dosc Diagnostic Provider;  Location: [facility] OR;  Service: Radiology;  Laterality: N/A;  10AM START    REPEAT ABDOMINAL PARACENTESIS N/A 3/16/2020    Procedure: PARACENTESIS, ABDOMINAL, REPEAT;  Surgeon: Dosc Diagnostic Provider;  Location: [facility] OR;  Service: Radiology;  Laterality: N/A;  10AM START    REPEAT ABDOMINAL PARACENTESIS N/A 3/30/2020    Procedure: PARACENTESIS, ABDOMINAL, REPEAT;  Surgeon: Dosc Diagnostic Provider;  Location: [facility] OR;  Service: Radiology;  Laterality: N/A;     REPEAT ABDOMINAL PARACENTESIS N/A 4/9/2020    Procedure: PARACENTESIS, ABDOMINAL, REPEAT;  Surgeon: Jovanic Diagnostic Provider;  Location: [facility] OR;  Service: Radiology;  Laterality: N/A;  1130AM START    REVISION OF PROCEDURE INVOLVING GLAUCOMA DRAINAGE DEVICE Left 10/2/2019    EXTRUDING BAERVELDT /WITH PERICARDIAL PATCH GRAFT ()    Right foot surgery  10/2014    TOE AMPUTATION Right     first and second    TOE AMPUTATION Left 11/16/2018    Procedure: AMPUTATION, TOES  2-5;  Surgeon: Mitch Chan MD;  Location: [facility] OR;  Service: Vascular;  Laterality: Left;    TOE AMPUTATION Left 12/5/2018    Procedure: AMPUTATION, TOE;  Surgeon: Mitch Chan MD;  Location: [facility] OR;  Service: Vascular;  Laterality: Left;  left great toe    TONSILLECTOMY         Home Meds:   Prior to Admission medications    Medication Sig Start Date End Date Taking? Authorizing Provider   allopurinoL (ZYLOPRIM) 300 MG tablet TAKE 1 TABLET(300 MG) BY MOUTH EVERY DAY 2/23/20   Rubén Davis MD   aspirin (ECOTRIN) 81 MG EC tablet Take 81 mg by mouth once daily.    Historical Provider, MD   bisacodyl (DULCOLAX) 5 mg EC tablet Take 5 mg by mouth daily as needed for Constipation.    Historical Provider, MD   brimonidine 0.2% (ALPHAGAN) 0.2 % Drop Place 1 drop into both eyes 3 (three) times daily. 12/12/19   Perla Cortés MD   carvediloL (COREG) 3.125 MG tablet TAKE 1 TABLET(3.125 MG) BY MOUTH TWICE DAILY 4/24/20   Rubén Davis MD   coenzyme Q10 100 mg capsule Take 100 mg by mouth every morning.    Historical Provider, MD   dorzolamide-timolol 2-0.5% (COSOPT) 22.3-6.8 mg/mL ophthalmic solution Place 1 drop into both eyes 3 (three) times daily. 12/12/19   Perla Cortés MD   ezetimibe (ZETIA) 10 mg tablet TAKE 1 TABLET(10 MG) BY MOUTH EVERY DAY 4/16/20   Rubén Davis MD   fish oil-omega-3 fatty acids 300-1,000 mg capsule Take 1 capsule by mouth 2 (two) times daily. 1/23/19   Sara Ching MD  "  gabapentin (NEURONTIN) 100 MG capsule TAKE 2 CAPSULES(200 MG) BY MOUTH EVERY EVENING 3/30/20   Rubén Davis MD   insulin degludec-liraglutide (XULTOPHY 100/3.6) 100 unit-3.6 mg /mL (3 mL) InPn Inject 34 Units into the skin once daily. 1/28/20   Sheryl Madison NP   MULTIVIT,THER IRON,CA,FA & MIN (MULTIVITAMIN) Tab Take 1 tablet by mouth every morning.     Historical Provider, MD   pen needle, diabetic 31 gauge x 1/4" Ndle Use as directed 8/11/16   Mike Bass MD   torsemide (DEMADEX) 20 MG Tab Take 4 tablets (80 mg total) by mouth 2 (two) times daily. 2/26/20 3/27/20  Rubén Davis MD     Anticoagulants/Antiplatelets: aspirin    Allergies:   Review of patient's allergies indicates:   Allergen Reactions    Statins-hmg-coa reductase inhibitors      Generalized Pain    Onglyza [saxagliptin]     Penicillins Rash     Sedation History:  no adverse reactions    Review of Systems:   Hematological: no known coagulopathies  Respiratory: no shortness of breath  Cardiovascular: no chest pain  Gastrointestinal: no abdominal pain, just feels tense  Genito-Urinary: no dysuria  Musculoskeletal: negative  Neurological: no TIA or stroke symptoms         OBJECTIVE:     Vital Signs (Most Recent)       Physical Exam:  ASA: 2  Mallampati: N/A    General: no acute distress  Mental Status: alert and oriented to person, place and time  HEENT: normocephalic, atraumatic  Chest: unlabored breathing  Heart: regular heart rate  Abdomen: distended  Extremity: moves all extremities    Laboratory  Lab Results   Component Value Date    INR 1.1 05/31/2019       Lab Results   Component Value Date    WBC 5.13 08/22/2019    HGB 9.9 (L) 08/22/2019    HCT 32.0 (L) 08/22/2019    MCV 86 08/22/2019     08/22/2019      Lab Results   Component Value Date    GLU 83 08/22/2019     08/22/2019    K 3.6 08/22/2019     08/22/2019    CO2 28 08/22/2019    BUN 39 (H) 08/22/2019    CREATININE 1.1 08/22/2019    CALCIUM 8.8 08/22/2019    " MG 2.1 05/31/2019    ALT 12 08/22/2019    AST 14 08/22/2019    ALBUMIN 2.8 (L) 08/22/2019    BILITOT 0.5 08/22/2019       ASSESSMENT/PLAN:     Sedation Plan: local anesthesia  Patient will undergo paracentesis.    Vin Kong MD  Staff Radiologist  Department of Radiology  Pager: [phone]

## 2020-05-12 ENCOUNTER — PROCEDURE VISIT (OUTPATIENT)
Dept: OPHTHALMOLOGY | Facility: CLINIC | Age: 60
End: 2020-05-12
Payer: MEDICAID

## 2020-05-12 VITALS — HEART RATE: 74 BPM | SYSTOLIC BLOOD PRESSURE: 119 MMHG | DIASTOLIC BLOOD PRESSURE: 62 MMHG

## 2020-05-12 DIAGNOSIS — H40.53X0 NVG (NEOVASCULAR GLAUCOMA), BILATERAL, STAGE UNSPECIFIED: Chronic | ICD-10-CM

## 2020-05-12 LAB
LEFT EYE DM RETINOPATHY: POSITIVE
RIGHT EYE DM RETINOPATHY: POSITIVE

## 2020-05-12 PROCEDURE — 92012 INTRM OPH EXAM EST PATIENT: CPT | Mod: 25,S$PBB,, | Performed by: OPHTHALMOLOGY

## 2020-05-12 PROCEDURE — 67028 INJECTION EYE DRUG: CPT | Mod: PBBFAC,RT | Performed by: OPHTHALMOLOGY

## 2020-05-12 PROCEDURE — 92134 POSTERIOR SEGMENT OCT RETINA (OCULAR COHERENCE TOMOGRAPHY)-BOTH EYES: ICD-10-PCS | Mod: 26,S$PBB,, | Performed by: OPHTHALMOLOGY

## 2020-05-12 PROCEDURE — 67028 PR INJECT INTRAVITREAL PHARMCOLOGIC: ICD-10-PCS | Mod: S$PBB,RT,, | Performed by: OPHTHALMOLOGY

## 2020-05-12 PROCEDURE — 67028 INJECTION EYE DRUG: CPT | Mod: S$PBB,RT,, | Performed by: OPHTHALMOLOGY

## 2020-05-12 PROCEDURE — 92134 CPTRZ OPH DX IMG PST SGM RTA: CPT | Mod: PBBFAC | Performed by: OPHTHALMOLOGY

## 2020-05-12 PROCEDURE — 92012 PR EYE EXAM, EST PATIENT,INTERMED: ICD-10-PCS | Mod: 25,S$PBB,, | Performed by: OPHTHALMOLOGY

## 2020-05-12 RX ADMIN — BEVACIZUMAB 1.25 MG: 100 INJECTION, SOLUTION INTRAVENOUS at 03:05

## 2020-05-12 NOTE — PATIENT INSTRUCTIONS

## 2020-05-12 NOTE — PROGRESS NOTES
HPI     4 months OCT avastin   DLS-11/25/2019 Dr. Jackman     No change in vision still can not see very well   Denies pain   (-)Flashes (+)Floaters  (-)Photophobia  (-)Glare      OCT : OD shows central DME, stable   OS ERM with DME central cyst, stable    Prior FA: shows macular leakage OU, significant NP OU, no NV OU, enlarged SURINDER OU    A/P    1. PDR s/p PRP OU (DM2), with CSME OU (and HTN)  - Encouraged good BS/BP/Cholesterol control  T2 uncontrolled on insulin  S/p PRP fill in 11/18    2. DME OU, OD>OS  - S/p Avastin OD x 18, OS x 7  - Will monitor OS for now given NVG and ERM  - Could consider Ozurdex but pt is POAG and phakic, IOP good right now)  With some NVI/NVA post CE OD  Will inject avastin q 6 month for now    - Avastin OD today      2. NVG OS   FH:   CCT:449 // 540    Gonio: OD CBB; OS sup CBB/nasal small NV at 9:00/inf PAS/temp CBB with PAS   Surgeries:;  phaco/BV 2012, s/p revision of BV for extrusion 10/19   Lasers: SLT   Tmax:    Tbase (before treatment):   Ttarget:     Drop intolerance:    APD:     - Stopped Xalatan OD due to macular edema  - pt reports good adherence  - No NVA OD. 1 NVA vessel at 9 oclock OS  - Cont drops as above    Sees KL    3. PCIOL OD  Some NVA noted after CE      4. PSK OS  - Stable, CTM, RD precautions       5. ERM OS>OD  - Given NVG hx, monitor  - CTM      6 months OCT    Risks, benefits, and alternatives to treatment discussed in detail with the patient.  The patient voiced understanding and wished to proceed with the procedure    Injection Procedure Note:  Diagnosis: PDR/NVG and DME OD    Patient Identified and Time Out complete  Topical Proparacaine and Betadine.  Inject Avastin OD at 6:00 @ 3.5-4mm posterior to limbus  Post Operative Dx: Same  Complications: None  Follow up as above.

## 2020-05-20 ENCOUNTER — HOSPITAL ENCOUNTER (OUTPATIENT)
Dept: CARDIOLOGY | Facility: HOSPITAL | Age: 60
Discharge: HOME | End: 2020-05-20
Attending: SURGERY
Payer: MEDICAID

## 2020-05-20 ENCOUNTER — PATIENT OUTREACH (OUTPATIENT)
Dept: ADMINISTRATIVE | Facility: OTHER | Age: 60
End: 2020-05-20

## 2020-05-20 DIAGNOSIS — I70.245 ATHEROSCLEROSIS OF NATIVE ARTERY OF LEFT LOWER EXTREMITY WITH ULCERATION OF OTHER PART OF FOOT: ICD-10-CM

## 2020-05-20 LAB
LEFT ABI: 1.26
LEFT ARM BP: 134 MMHG
LEFT DORSALIS PEDIS: 169 MMHG
LEFT POSTERIOR TIBIAL: 97 MMHG
RIGHT ABI: 1.9
RIGHT ARM BP: 131 MMHG
RIGHT DORSALIS PEDIS: 254 MMHG
RIGHT POSTERIOR TIBIAL: 43 MMHG
RIGHT TBI: 0.28
RIGHT TOE PRESSURE: 38 MMHG

## 2020-05-20 PROCEDURE — 93922 UPR/L XTREMITY ART 2 LEVELS: CPT | Mod: 26,,, | Performed by: SURGERY

## 2020-05-20 PROCEDURE — 93922 UPR/L XTREMITY ART 2 LEVELS: CPT | Mod: 50

## 2020-05-20 PROCEDURE — 93922 CV US ANKLE BRACHIAL INDICES EXT LTD WO STRESS (CUPID ONLY): ICD-10-PCS | Mod: 26,,, | Performed by: SURGERY

## 2020-05-21 ENCOUNTER — OFFICE VISIT (OUTPATIENT)
Dept: VASCULAR SURGERY | Facility: CLINIC | Age: 60
End: 2020-05-21
Payer: MEDICAID

## 2020-05-21 ENCOUNTER — HOSPITAL ENCOUNTER (OUTPATIENT)
Facility: HOSPITAL | Age: 60
Discharge: HOME | End: 2020-05-21
Attending: SURGERY | Admitting: SURGERY
Payer: MEDICAID

## 2020-05-21 VITALS — SYSTOLIC BLOOD PRESSURE: 120 MMHG | BODY MASS INDEX: 27.11 KG/M2 | DIASTOLIC BLOOD PRESSURE: 62 MMHG | HEIGHT: 70 IN

## 2020-05-21 VITALS — DIASTOLIC BLOOD PRESSURE: 66 MMHG | SYSTOLIC BLOOD PRESSURE: 123 MMHG

## 2020-05-21 DIAGNOSIS — I70.245 ATHEROSCLEROSIS OF NATIVE ARTERY OF LEFT LOWER EXTREMITY WITH ULCERATION OF OTHER PART OF FOOT: Primary | ICD-10-CM

## 2020-05-21 DIAGNOSIS — S91.302D OPEN WOUND OF FOOT, LEFT, SUBSEQUENT ENCOUNTER: ICD-10-CM

## 2020-05-21 DIAGNOSIS — R18.8 OTHER ASCITES: Primary | ICD-10-CM

## 2020-05-21 PROCEDURE — 99999 PR PBB SHADOW E&M-EST. PATIENT-LVL III: ICD-10-PCS | Mod: PBBFAC,,, | Performed by: SURGERY

## 2020-05-21 PROCEDURE — 99999 PR PBB SHADOW E&M-EST. PATIENT-LVL III: CPT | Mod: PBBFAC,,, | Performed by: SURGERY

## 2020-05-21 PROCEDURE — P9047 ALBUMIN (HUMAN), 25%, 50ML: HCPCS | Performed by: RADIOLOGY

## 2020-05-21 PROCEDURE — 99215 OFFICE O/P EST HI 40 MIN: CPT | Mod: S$PBB,,, | Performed by: SURGERY

## 2020-05-21 PROCEDURE — 63600175 PHARM REV CODE 636 W HCPCS: Performed by: RADIOLOGY

## 2020-05-21 PROCEDURE — 99213 OFFICE O/P EST LOW 20 MIN: CPT | Mod: PBBFAC | Performed by: SURGERY

## 2020-05-21 PROCEDURE — 99215 PR OFFICE/OUTPT VISIT, EST, LEVL V, 40-54 MIN: ICD-10-PCS | Mod: S$PBB,,, | Performed by: SURGERY

## 2020-05-21 RX ORDER — ALBUMIN HUMAN 250 G/1000ML
50 SOLUTION INTRAVENOUS ONCE AS NEEDED
Status: COMPLETED | OUTPATIENT
Start: 2020-05-21 | End: 2020-05-21

## 2020-05-21 RX ADMIN — ALBUMIN (HUMAN) 50 G: 0.25 INJECTION, SOLUTION INTRAVENOUS at 11:05

## 2020-05-21 NOTE — LETTER
May 21, 2020        Rubén Davis MD  [address] [facility] [address]             [facility] Vascular Surgery  [address]  [facility] [address]  Phone: [phone]  Fax: [phone]   Patient: Marvin Ray   MR Number: [id number]   YOB: 1960   Date of Visit: 5/21/2020       Dear Dr. Davis:    Thank you for referring Marvin Ray to me for evaluation. Below are the relevant portions of my assessment and plan of care.            If you have questions, please do not hesitate to call me. I look forward to following Marvin along with you.    Sincerely,      Mitch Chan MD           CC  No Recipients

## 2020-05-21 NOTE — BRIEF OP NOTE
Radiology Post-Procedure Note     Pre Op Diagnosis: Recurrent painful, tense ascites  Post Op Diagnosis: Same     Procedure: U/S-guided therapeutic LVP     Procedure performed by: Zach Cha MD     Written Informed Consent Obtained: Yes  Specimen Removed: YES, 7.2-L of thin, straw-colored ascitic fluid  Estimated Blood Loss: none     Findings:   1. Successful therapeutic large-volume paracentesis with local anesthetic only and albumin infusion post PRN as indicated per institutional protocol. Patient tolerated the procedure well. No immediate post-procedural complications noted.       Thank you for considering IR for the care of your patient.      Zach Cha MD  Interventional Radiology

## 2020-05-21 NOTE — H&P
Radiology History & Physical      SUBJECTIVE:     Chief Complaint: Recurrent painful, tense ascites     History of Present Illness:  Marvin Ray is a 58 y.o. male with PMHx of CKD III and combined systolic/diastolic CHF complicated by recurrent abdominal distension and pain 2/2 recurrent painful, tense ascites without TTP on PE to suggest SBP requiring frequent  therapeutic LVP.      A new outpatient IR consult placed for US-guided therapeutic paracentesis.    Past Medical History:   Diagnosis Date    Arthritis     Cellulitis     CKD (chronic kidney disease), stage III     Coronary artery disease     Diabetes mellitus     Diabetic retinopathy     Diabetic ulcer of left foot     Glaucoma     Gout     Hyperlipemia     Hypertension     ICD (implantable cardioverter-defibrillator) in place 11/02/2018    Left chest    Non-pressure chronic ulcer of other part of left foot with fat layer exposed 10/23/2018    PVD (peripheral vascular disease)     Type 2 diabetes mellitus with left diabetic foot ulcer 10/29/2018    Unsteady gait     uses a wheelchair     Past Surgical History:   Procedure Laterality Date    AHMED GLAUCOMA IMPLANT Left 2011    DONE AT [facility]    AORTOGRAPHY WITH SERIALOGRAPHY Left 4/30/2019    Procedure: AORTOGRAM, WITH SERIALOGRAPHY, LEFT LOWER EXTREMITY, POSSIBLE INTERVENTION;  Surgeon: Mitch Chan MD;  Location: [facility];  Service: Vascular;  Laterality: Left;  RN PRE OP 4-23-19.  NO H & P, No Cosent  [place]    BAERVELDT GLAUCOMA IMPLANT Left 2012    WITH CATARACT EXTRACTION//DONE AT [facility]    CARDIAC CATHETERIZATION Left 05/2016    CARDIAC DEFIBRILLATOR PLACEMENT Left 11/02/2018    CATARACT EXTRACTION W/  INTRAOCULAR LENS IMPLANT Left 2012    WITH BAERVEDT//DONE AT [facility]    CATARACT EXTRACTION W/  INTRAOCULAR LENS IMPLANT Right 09/26/2018    COMPLEX ()    CB DESTRUCTION WITH CYCLO G6 Left 02/15/2017        CYST REMOVAL      DEBRIDEMENT OF  FOOT  12/28/2018    Procedure: DEBRIDEMENT, FOOT;  Surgeon: Mitch Wall MD;  Location: [facility] OR;  Service: Vascular;;    DEBRIDEMENT OF FOOT  6/5/2019    Procedure: DEBRIDEMENT, FOOT;  Surgeon: Mitch Wall MD;  Location: [facility] OR;  Service: Vascular;;    EXAMINATION UNDER ANESTHESIA Left 12/5/2018    Procedure: Exam under anesthesia, left foot debridement, washout and all other indicated procedures;  Surgeon: Mitch Wall MD;  Location: [facility] OR;  Service: Vascular;  Laterality: Left;  1030AM START  RN PREOP 12/3/2018-----AICD  SEEN BY DR JAMES 12/4    EXAMINATION UNDER ANESTHESIA Left 2/13/2019    Procedure: LEFT FOOT WOUND DEBRIDEMENT, WASHOUT AND ALL OTHER INDICATED PROCEDURES;  Surgeon: Mitch Wall MD;  Location: [facility] OR;  Service: Vascular;  Laterality: Left;  requesting 1st case start  THIS CASE 1ST PER DR WALL ON 2/1/19 @ 844AM -[place]  RN PREOP 2/6/2019  HAS CARDIAC CLEARANCE    EXAMINATION UNDER ANESTHESIA Left 12/28/2018    Procedure: Exam under anesthesia, left foot debridement, left foot washout, possible left second through fifth metatarsal resection;  Surgeon: Mitch Wall MD;  Location: [facility] OR;  Service: Vascular;  Laterality: Left;  1:00pm start per julissa @ 8:58am  RN  PHONE PRE OP 12-27-18--=Pt coming from [facility] on [address]  NEED CONSENT    EXAMINATION UNDER ANESTHESIA Left 6/5/2019    Procedure: LEFT FOOT DEBRIDEMENT, WASHOUT AND ALL OTHER INDICATED PROCEDURES;  Surgeon: Mitch Wall MD;  Location: [facility] OR;  Service: Vascular;  Laterality: Left;  RN PRE OP 5-31-19------ICD------NEED CONSENT    INCISION AND DRAINAGE Left 11/14/2018    Procedure: Incision and Drainage, left lower extremity debridement, washout;  Surgeon: Mitch Wall MD;  Location: [facility] OR;  Service: Vascular;  Laterality: Left;    INCISION AND DRAINAGE Left 11/16/2018    Procedure: INCISION AND DRAINAGE;  Surgeon: Mitch Wall MD;  Location: [facility] OR;   Service: Vascular;  Laterality: Left;    INTRAOCULAR PROSTHESES INSERTION Right 9/26/2018    Procedure: INSERTION, IOL PROSTHESIS;  Surgeon: Perla Cortés MD;  Location: [address];  Service: Ophthalmology;  Laterality: Right;    PERITONEOCENTESIS N/A 3/1/2019    Procedure: PARACENTESIS, ABDOMINAL, INITIAL;  Surgeon: Dosc Diagnostic Provider;  Location: [facility] OR;  Service: Radiology;  Laterality: N/A;    PERITONEOCENTESIS N/A 3/25/2019    Procedure: PARACENTESIS, ABDOMINAL, INITIAL;  Surgeon: Dosc Diagnostic Provider;  [facility];  Service: Radiology;  Laterality: N/A;    PERITONEOCENTESIS N/A 7/22/2019    Procedure: PARACENTESIS, ABDOMINAL, INITIAL;  Surgeon: Dosc Diagnostic Provider;  Location: [facility] OR;  Service: Radiology;  Laterality: N/A;    PERITONEOCENTESIS N/A 8/16/2019    Procedure: PARACENTESIS, ABDOMINAL, INITIAL;  Surgeon: Dosc Diagnostic Provider;  Location: [facility] OR;  Service: Radiology;  Laterality: N/A;    PERITONEOCENTESIS N/A 9/26/2019    Procedure: PARACENTESIS, ABDOMINAL;  Surgeon: Dosc Diagnostic Provider;  Location: [facility] OR;  Service: Radiology;  Laterality: N/A;    PERITONEOCENTESIS N/A 10/11/2019    Procedure: PARACENTESIS, ABDOMINAL;  Surgeon: Dosc Diagnostic Provider;  Location: [facility] OR;  Service: Radiology;  Laterality: N/A;    PERITONEOCENTESIS N/A 10/31/2019    Procedure: PARACENTESIS, ABDOMINAL;  Surgeon: Dosc Diagnostic Provider;  Location: [facility] OR;  Service: Radiology;  Laterality: N/A;    PERITONEOCENTESIS N/A 11/18/2019    Procedure: PARACENTESIS, ABDOMINAL;  Surgeon: Dosc Diagnostic Provider;  [facility]: [facility];  Service: Radiology;  Laterality: N/A;    PERITONEOCENTESIS N/A 12/16/2019    Procedure: PARACENTESIS, ABDOMINAL;  Surgeon: Dosc Diagnostic Provider;  Location: [facility] OR;  Service: Radiology;  Laterality: N/A;  2PM START    PERITONEOCENTESIS N/A 2/7/2020    Procedure: PARACENTESIS, ABDOMINAL;  Surgeon: Dosc Diagnostic Provider;  Location: [facility] OR;   Service: Radiology;  Laterality: N/A;  REQUESTED 10:30A START    PERITONEOCENTESIS N/A 2/19/2020    Procedure: PARACENTESIS, ABDOMINAL;  Surgeon: Dos Diagnostic Provider;  Location: [facility] OR;  Service: Radiology;  Laterality: N/A;    PERITONEOCENTESIS N/A 2/28/2020    Procedure: PARACENTESIS, ABDOMINAL;  Surgeon: Dos Diagnostic Provider;  Location: [facility] OR;  Service: Radiology;  Laterality: N/A;  REQUESTED 10AM START    PHACOEMULSIFICATION OF CATARACT Right 9/26/2018    Procedure: PHACOEMULSIFICATION, CATARACT;  Surgeon: Perla Cortés MD;  [facility];  Service: Ophthalmology;  Laterality: Right;    REPEAT ABDOMINAL PARACENTESIS N/A 2/11/2019    Procedure: PARACENTESIS, ABDOMINAL, REPEAT;  Surgeon: [facility] Diagnostic Provider;  Location: [facility] OR;  Service: Radiology;  Laterality: N/A;  1PM START    REPEAT ABDOMINAL PARACENTESIS N/A 9/12/2019    Procedure: PARACENTESIS, ABDOMINAL, REPEAT;  Surgeon: [facility] Diagnostic Provider;  Location: [facility] OR;  Service: Radiology;  Laterality: N/A;  9AM START    REPEAT ABDOMINAL PARACENTESIS N/A 12/2/2019    Procedure: PARACENTESIS, ABDOMINAL, REPEAT;  Surgeon: Dos Diagnostic Provider;  Location: [facility] OR;  Service: Radiology;  Laterality: N/A;  3PM START    REPEAT ABDOMINAL PARACENTESIS N/A 1/10/2020    Procedure: PARACENTESIS, ABDOMINAL, REPEAT;  Surgeon: Dos Diagnostic Provider;  Location: [facility] OR;  Service: Radiology;  Laterality: N/A;  1130AM START    REPEAT ABDOMINAL PARACENTESIS N/A 1/20/2020    Procedure: PARACENTESIS, ABDOMINAL, REPEAT;  Surgeon: Dos Diagnostic Provider;  Location: [facility] OR;  Service: Radiology;  Laterality: N/A;  1PM START    REPEAT ABDOMINAL PARACENTESIS N/A 1/31/2020    Procedure: PARACENTESIS, ABDOMINAL, REPEAT;  Surgeon: [facility] Diagnostic Provider;  Location: [facility] OR;  Service: Radiology;  Laterality: N/A;  10AM START    REPEAT ABDOMINAL PARACENTESIS N/A 3/16/2020    Procedure: PARACENTESIS, ABDOMINAL, REPEAT;  Surgeon: Dosc  Diagnostic Provider;  Location: [facility] OR;  Service: Radiology;  Laterality: N/A;  10AM START    REPEAT ABDOMINAL PARACENTESIS N/A 3/30/2020    Procedure: PARACENTESIS, ABDOMINAL, REPEAT;  Surgeon: [facility] Diagnostic Provider;  Location: [facility] OR;  Service: Radiology;  Laterality: N/A;    REPEAT ABDOMINAL PARACENTESIS N/A 4/9/2020    Procedure: PARACENTESIS, ABDOMINAL, REPEAT;  Surgeon: [facility] Diagnostic Provider;  Location: [facility] OR;  Service: Radiology;  Laterality: N/A;  1130AM START    REPEAT ABDOMINAL PARACENTESIS N/A 5/8/2020    Procedure: PARACENTESIS, ABDOMINAL, REPEAT;  Surgeon: [facility] Diagnostic Provider;  Location: [facility] OR;  Service: Radiology;  Laterality: N/A;  9AM START    REVISION OF PROCEDURE INVOLVING GLAUCOMA DRAINAGE DEVICE Left 10/2/2019    EXTRUDING BAERVELDT /WITH PERICARDIAL PATCH GRAFT ()    Right foot surgery  10/2014    TOE AMPUTATION Right     first and second    TOE AMPUTATION Left 11/16/2018    Procedure: AMPUTATION, TOES  2-5;  Surgeon: Mitch Chan MD;  Location: [facility] OR;  Service: Vascular;  Laterality: Left;    TOE AMPUTATION Left 12/5/2018    Procedure: AMPUTATION, TOE;  Surgeon: Mitch Chan MD;  Location: [facility] OR;  Service: Vascular;  Laterality: Left;  left great toe    TONSILLECTOMY         Home Meds:   Prior to Admission medications    Medication Sig Start Date End Date Taking? Authorizing Provider   allopurinoL (ZYLOPRIM) 300 MG tablet TAKE 1 TABLET(300 MG) BY MOUTH EVERY DAY 2/23/20   Rubén Davis MD   aspirin (ECOTRIN) 81 MG EC tablet Take 81 mg by mouth once daily.    Historical Provider, MD   bisacodyl (DULCOLAX) 5 mg EC tablet Take 5 mg by mouth daily as needed for Constipation.    Historical Provider, MD   brimonidine 0.2% (ALPHAGAN) 0.2 % Drop Place 1 drop into both eyes 3 (three) times daily. 12/12/19   Perla Cortés MD   carvediloL (COREG) 3.125 MG tablet TAKE 1 TABLET(3.125 MG) BY MOUTH TWICE DAILY 4/24/20   Rubén Davis MD  "  coenzyme Q10 100 mg capsule Take 100 mg by mouth every morning.    Historical Provider, MD   dorzolamide-timolol 2-0.5% (COSOPT) 22.3-6.8 mg/mL ophthalmic solution Place 1 drop into both eyes 3 (three) times daily. 12/12/19   Perla Cortés MD   ezetimibe (ZETIA) 10 mg tablet TAKE 1 TABLET(10 MG) BY MOUTH EVERY DAY 4/16/20   Rubén Davis MD   fish oil-omega-3 fatty acids 300-1,000 mg capsule Take 1 capsule by mouth 2 (two) times daily. 1/23/19   Sara Ching MD   gabapentin (NEURONTIN) 100 MG capsule TAKE 2 CAPSULES(200 MG) BY MOUTH EVERY EVENING 3/30/20   Rubén Davis MD   insulin degludec-liraglutide (XULTOPHY 100/3.6) 100 unit-3.6 mg /mL (3 mL) InPn Inject 34 Units into the skin once daily. 1/28/20   Sheryl Madison NP   MULTIVIT,THER IRON,CA,FA & MIN (MULTIVITAMIN) Tab Take 1 tablet by mouth every morning.     Historical Provider, MD   pen needle, diabetic 31 gauge x 1/4" Ndle Use as directed 8/11/16   Mike Bass MD   torsemide (DEMADEX) 20 MG Tab Take 4 tablets (80 mg total) by mouth 2 (two) times daily. 2/26/20 3/27/20  Rubén Davis MD     Anticoagulants/Antiplatelets: no anticoagulation    Allergies:   Review of patient's allergies indicates:   Allergen Reactions    Statins-hmg-coa reductase inhibitors      Generalized Pain    Onglyza [saxagliptin]     Penicillins Rash     Sedation History:  no adverse reactions     Review of Systems:   Hematological: no known coagulopathies  Respiratory: positive for - orthopnea and shortness of breath  Cardiovascular: positive for - dyspnea on exertion, orthopnea and shortness of breath  Gastrointestinal: positive for - abdominal pain and distension  Genito-Urinary: no dysuria, trouble voiding, or hematuria  Musculoskeletal: negative  Neurological: no TIA or stroke symptoms       OBJECTIVE:     Vital Signs (Most Recent)  BP: 128/70 (05/21/20 1110)     Physical Exam:  General: no acute distress  Mental Status: alert and oriented to person, " place and time  HEENT: normocephalic, atraumatic  Chest: mild labored breathing  Heart: regular heart rate  Abdomen: +distended. No TTP/r/g.  Extremity: moves all extremities    Laboratory  Lab Results   Component Value Date    INR 1.1 05/31/2019       Lab Results   Component Value Date    WBC 5.13 08/22/2019    HGB 9.9 (L) 08/22/2019    HCT 32.0 (L) 08/22/2019    MCV 86 08/22/2019     08/22/2019      Lab Results   Component Value Date    GLU 83 08/22/2019     08/22/2019    K 3.6 08/22/2019     08/22/2019    CO2 28 08/22/2019    BUN 39 (H) 08/22/2019    CREATININE 1.1 08/22/2019    CALCIUM 8.8 08/22/2019    MG 2.1 05/31/2019    ALT 12 08/22/2019    AST 14 08/22/2019    ALBUMIN 2.8 (L) 08/22/2019    BILITOT 0.5 08/22/2019     ASSESSMENT/PLAN:     57 y/o M with CKD III and combined systolic/diastolic CHF complicated by recurrent abdominal distension and pain 2/2 recurrent painful, tense ascites without TTP on PE to suggest SBP, requiring frequent large-volume therapeutic paracentesis.      1. Will attempt U/S-guided therapeutic paracentesis with local anesthetic only and albumin infusion post PRN per institutional protocol.      Risks (including, but not limited to, pain, bleeding, infection, damage to nearby structures, failure to obtain sufficient material for a diagnosis, the need for additional procedures, and death), benefits, and alternatives were discussed with the patient. All questions were answered to the best of my abilities. The patient wishes to proceed with the procedure. Written informed consent was obtained.     Thank you for considering IR for the care of your patient.      Zach Cha MD  Interventional Radiology

## 2020-05-21 NOTE — PATIENT INSTRUCTIONS
Understanding Peripheral Arterial Disease    Peripheral arteries deliver oxygen-rich blood to the tissues outside the heart. As you age, your arteries become stiffer and thicker. In addition, risk factors, such as smoking and high cholesterol, can damage the artery lining. This allows a buildup of fat and other materials (plaque) to form within the artery walls. The buildup of plaque narrows the space inside the artery and sometimes blocks blood flow. Peripheral arterial disease (PAD) happens when blood flow through the arteries is reduced because of plaque buildup. It often happens in the legs and feet, but can also happen elsewhere in the body. If this buildup happens in the a large artery in the neck (carotid artery), it can be a major contributor to stroke.  A healthy artery  An artery is a muscular tube that carries oxgen rich blood and nutrients from the heart to the rest of the body. It has a smooth lining and flexible walls that allow blood to pass freely. When active, muscles need more oxygen. This increases blood flow. Healthy arteries can adapt to meet this need.  A damaged artery    PAD begins when the lining of an artery is damaged. This is often because of risk factors, such as smoking, older age, or diabetes. Plaque then starts to form within the artery wall. At this stage, blood flows normally, so youre not likely to have symptoms.  A narrowed artery    If plaque continues to build up, the space inside the artery narrows. The artery walls become less able to expand. The artery still provides enough blood and oxygen to your muscles during rest. But when youre active, the increased demand for blood cant be met. As a result, your leg may cramp or ache when you walk.  A blocked artery    An artery can become blocked by plaque or by a blood clot lodged in a narrowed section. When this happens, oxygen cant reach the muscle below the blockage. Then you may feel pain when lying down or when you are not  active (rest pain). This type of pain is especially common at night when youre lying flat. In time, the affected tissue can die. This can lead to the loss of a toe or foot.  Date Last Reviewed: 5/1/2016 © 2000-2017 [employer]. [address] [place] [id number]. All rights reserved. This information is not intended as a substitute for professional medical care. Always follow your healthcare professional's instructions.        Peripheral Artery Disease (PAD)     Peripheral artery disease (PAD) occurs when the arteries that carry blood to the limbs are narrowed or blocked. This is usually due to a buildup of a fatty substance called plaque in the walls of the arteries.  PAD most often affects the arteries in the legs. When these arteries are narrowed or blocked, blood flow to the legs is reduced. This can cause leg and foot pain and other symptoms. If severe enough, reduced blood flow to the legs can lead to tissue death (gangrene) and the loss of a toe, foot, or leg. Having PAD also makes it more likely that arteries in other body areas are blocked. For instance, arteries that carry blood to the heart or brain may be affected. This raises the chances of heart attack and stroke.  Risk factors  Certain factors can make PAD more likely. They include:  · Smoking  · Diabetes  · High blood pressure  · Unhealthy cholesterol levels  · Obesity  · Inactive lifestyle  · Older age  · Family history of PAD  Symptoms  Many people with PAD have no symptoms. If symptoms do occur, they can include:  · Pain in the muscles of the calves, thighs or hips that gets worse with activity and better with rest (intermittent claudication)  · Achy, tired, or heavy feeling in the legs  · Weakness, numbness, tingling, or loss of feeling in the legs  · Changes in skin color of the legs  · Sores on the legs and feet  · Cold leg, feet, or toes  · Pain the feet or toes even when lying down (rest pain)  Home care  PAD is a  chronic (lifelong) condition. Treatment is focused on managing your condition and lowering your health risks. This may include doing the following:  · If you smoke, quit. This helps prevent further damage to your arteries and lowers your health risks. Ask your provider about medicines or products that can help you quit smoking. Also consider joining a stop-smoking program or support group.  · Be more active. This helps you lose weight and manage problems such as high blood pressure and unhealthy cholesterol levels. Start a walking program if advised to by your provider. Your provider may also help you form a safe exercise program that is right for your needs.  · Make healthy eating changes. This includes eating less fat, salt, and sugar.  · Take medicines for high blood pressure, unhealthy cholesterol levels, and diabetes as directed.  · Have your blood pressure and cholesterol levels checked as often as directed.  · If you have diabetes, try to keep your blood sugar well controlled. Test your blood sugar as directed.  · If you are overweight, talk to your provider about forming a weight-loss plan.  · Watch for cuts, scrapes, or open sores on your feet. Poor blood flow to the feet may slow healing and increase the risk of infection from these problems.   Follow-up care  Follow up with your healthcare provider, or as advised. If imaging tests such as ultrasound were done, they will be reviewed by a doctor. You will be told the results and any new findings that may affect your care.  When to seek medical advice   Call your healthcare provider right away if any of these occur:  · Sudden severe pain in the legs or feet  · Sudden cold, pale or blue color in the legs or feet  · Weakness or numbness in the legs or feet that worsens  · Any sore or wound in the legs or feet that wont heal  · Weak pulse in your legs or feet  Know the Signs of Heart Attack and Stroke  People with PAD are at high risk for heart attack and  stroke. Knowing the signs of these problems can help you protect your health and get help when you need it. Call 911 right away if you have any of the following:  Signs of a Heart Attack  · Chest discomfort, such as pain, aching, tightness, or pressure that lasts more than a few minutes, or that comes and goes  · Pain or discomfort in the arms, back, shoulders, neck, or jaw  · Shortness of breath  · Sweating (often a cold, clammy sweat)  · Nausea  · Lightheadedness  Signs of a Stroke  · Sudden numbness or weakness of the face, arms, or legs, especially on one side  · Sudden confusion or trouble speaking or understanding  · Sudden trouble seeing in one or both eyes  · Sudden trouble walking, dizziness, or loss of balance  · Sudden, severe headache with no known cause   Date Last Reviewed: 9/21/2015  © 3751-9652 [employer]. [address] [place] [address]. All rights reserved. This information is not intended as a substitute for professional medical care. Always follow your healthcare professional's instructions.        A Walking Program for Peripheral Arterial Disease (PAD)  Peripheral arterial disease (PAD) is a condition where the arteries in the legs are severely damaged. When you have PAD, walking can be painful. So you may start to walk less. Walking less makes your leg muscles weaker. It then becomes harder and more painful to walk. A walking program can break this cycle. This sheet gives you tips on how to get started.     Walking farther without pain  Exercise strengthens leg muscles that are out of shape. It also trains these muscles to work with less oxygen. This helps your leg muscles work better even with reduced blood flow to your legs. When you have PAD, a walking program can be helpful. Your program can:  · Let you walk longer and farther without claudication. This is an ache in your legs during exercise that goes away with rest.  · Let you do more and be more active  · Add to  your overall health and well-being  · Help you control your blood sugar and blood pressure  · Help you become healthier with no risk and at little or no cost  Getting started  Your local hospital, vascular center, or cardiac rehab center may have a special walking program for people with PAD. If so, this is your best option. But if you cant find a program, or its not covered by insurance, you can still walk on your own. Follow these steps at each session:  · Step 1. Start at a pace that lets you walk for 5 to 10 minutes before you start to feel claudication. This feeling is unpleasant, but it doesnt hurt you. Keep going until the pain makes you feel that you need to stop.  · Step 2. Stop and rest for 3 to 5 minutes, just long enough for the pain to go away. You can rest standing or sitting. (Some people like to bring along a cane or a lightweight folding chair.)  · Step 3. Again walk at a pace that lets you walk for 5 to 10 minutes more before you feel pain. This may be slower than your starting pace in step 1. Then rest again.  · Step 4. Repeat this process until youve walked for 45 minutes. This should be about 60 to 80 minutes total, including resting time. You may not be able to do a full 45 minutes at first. Do as much as you can, and increase your time as you improve.  Making the most of your program  · Walk at least 3 times a week. Take no more than 2 days off between sessions. If you stop walking, even for a week or two, you can lose the health benefits of your program.  · Find a good place to walk. A treadmill or a track may be better at first. That way, you wont run the risk of going too far and finding that you cant walk back. Be sure to have a place to walk indoors in bad weather, such as a gym or a mall.  · Wear shoes with sturdy, flexible soles. The heel should fit without slipping. You should have room to wiggle your toes.  · Keep track of how long you walk. A pedometer will show your daily  progress. It can also show how much farther you can walk as time goes by.  · Ask a friend to keep you company. You may enjoy walking with someone else. Or you may want to make your walking sessions a time to relax by yourself.  · Make it fun. Listen to music while you walk and rest. Walk in a favorite park. Get to know the people at the gym, or the folks that you pass on your route. While you rest, you can window-shop, read, or feed the birds. Do whatever will help you enjoy your exercise sessions.  Date Last Reviewed: 6/1/2016  © 1290-6861 [employer]. [address] [place] [address], [place] [address]. All rights reserved. This information is not intended as a substitute for professional medical care. Always follow your healthcare professional's instructions.

## 2020-05-21 NOTE — PROGRESS NOTES
Mitch Chan MD [facility]                         05/21/2020    HPI:  Marvin Ray is a 60 y.o. male with   Patient Active Problem List   Diagnosis    Epiretinal membrane, both eyes    Nuclear sclerotic cataract of right eye    Pseudophakia, left eye    Ptosis, left eyelid    DM type 2 with diabetic peripheral neuropathy    HLD (hyperlipidemia)    Neovascular glaucoma of both eyes    Tophaceous gout    Essential hypertension    Coronary artery disease involving native coronary artery of native heart without angina pectoris    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 edema, with long-term current use of insulin    Neovascular glaucoma of left eye, severe stage    Statin myopathy    Neovascular glaucoma, right eye, mild stage    Left leg cellulitis    PVD (peripheral vascular disease)    Right wrist pain    Multiple open wounds of lower leg    Hepatosplenomegaly    Type 2 diabetes mellitus with left diabetic foot ulcer    Open wound of left foot    Cellulitis of left lower extremity    Diabetic foot infection    Other ascites    Severe malnutrition    Goals of care, counseling/discussion    Alteration in skin integrity    MDR Acinetobacter baumannii infection    Takes dietary supplements    Intertriginous dermatitis associated with moisture    Debility    Infection due to multidrug-resistant Pseudomonas aeruginosa    Subacute osteomyelitis of left foot    Ascites    Acute on chronic combined systolic and diastolic congestive heart failure    Stage 3 chronic kidney disease    Atherosclerosis of left lower extremity with ulceration of midfoot    CKD stage 3 due to type 2 diabetes mellitus    Azotemia    Hyponatremia    Anemia due to multiple mechanisms    Persistent proteinuria    Hypokalemia    Open wound of foot, left, subsequent encounter    Disorder  due to insertion of glaucoma drainage device    Ischemic cardiomyopathy    Status post abdominal paracentesis    being managed by PCP and specialists who is here today for evaluation of L foot wound.  Seen in hospital last mo with LLE cellulitis.  Treated with Abx.  Also had paracentesis for ascites with negative w/u per family.  Patient states location is L foot occurring for 1-2 mo, worsening foot wound although improvement in edema and erythema.  Associated signs and symptoms include pain and edema.  Quality is aching and severity is 5/10.  Symptoms began 10/2018 spontaneously with blistering and severe edema.  Alleviating factors include wound care and elevation.  Worsening factors include pressure.    Tobacco use: denies    1/4/19: s/p LLE angioplasty and multiple subsequent OR and bedside debridements.  On IV Abx per culture results.  Glucose better controlled.  No fevers.  Receiving local wound care with Santyl.    1/14/19:  Cont to receive local wound care.  Pseudomonas in tissue and bone cultures multidrug resistant other than aminoglycoside; d/w Dr. Velez with high risk of ESRD with 6 weeks of aminoglycoside treatment.  No F/C.    1/31/19:  Pt without complaints.  Was started back on Bactrim.  Family doing wound care.    2/28/19: S/p L foot debridement 2/13/19.  Started on Meropenem.    3/28/19:  No complaints.  S/p paracentesis and pt feels better.    5/16/19:  S/p L peroneal and AT angioplasty, wound healing well.    9/30/19:  S/p L foot debridement 6/5/19. WV placed.    11/2019:  States wound is healing well.  Did not see Plastic Surgery clinic.  Cont wound care center and home health wound care.    2/2020: States wound nearly fully healed.  Receiving wound care daily.    Interval history:  Wounds healing.  Seeing Dr. Davis next week.    Past Medical History:   Diagnosis Date    Arthritis     Cellulitis     CKD (chronic kidney disease), stage III     Coronary artery disease     Diabetes mellitus      Diabetic retinopathy     Diabetic ulcer of left foot     Glaucoma     Gout     Hyperlipemia     Hypertension     ICD (implantable cardioverter-defibrillator) in place 11/02/2018    Left chest    Non-pressure chronic ulcer of other part of left foot with fat layer exposed 10/23/2018    PVD (peripheral vascular disease)     Type 2 diabetes mellitus with left diabetic foot ulcer 10/29/2018    Unsteady gait     uses a wheelchair     Past Surgical History:   Procedure Laterality Date    AHMED GLAUCOMA IMPLANT Left 2011    DONE AT [facility]    AORTOGRAPHY WITH SERIALOGRAPHY Left 4/30/2019    Procedure: AORTOGRAM, WITH SERIALOGRAPHY, LEFT LOWER EXTREMITY, POSSIBLE INTERVENTION;  Surgeon: Mitch Chan MD;  Location: [facility] OR;  Service: Vascular;  Laterality: Left;  RN PRE OP 4-23-19.  NO H & P, No Cosent  [place]    BAERVELDT GLAUCOMA IMPLANT Left 2012    WITH CATARACT EXTRACTION//DONE AT [facility]    CARDIAC CATHETERIZATION Left 05/2016    CARDIAC DEFIBRILLATOR PLACEMENT Left 11/02/2018    CATARACT EXTRACTION W/  INTRAOCULAR LENS IMPLANT Left 2012    WITH BAERVEDT//DONE AT [facility]    CATARACT EXTRACTION W/  INTRAOCULAR LENS IMPLANT Right 09/26/2018    COMPLEX ()    CB DESTRUCTION WITH CYCLO G6 Left 02/15/2017        CYST REMOVAL      DEBRIDEMENT OF FOOT  12/28/2018    Procedure: DEBRIDEMENT, FOOT;  Surgeon: Mitch Chan MD;  Location: [facility] OR;  Service: Vascular;;    DEBRIDEMENT OF FOOT  6/5/2019    Procedure: DEBRIDEMENT, FOOT;  Surgeon: Mitch Chan MD;  Location: [facility] OR;  Service: Vascular;;    EXAMINATION UNDER ANESTHESIA Left 12/5/2018    Procedure: Exam under anesthesia, left foot debridement, washout and all other indicated procedures;  Surgeon: Mitch Chan MD;  Location: [facility] OR;  Service: Vascular;  Laterality: Left;  1030AM START  RN PREOP 12/3/2018-----AICD  SEEN BY DR JAMSE 12/4    EXAMINATION UNDER ANESTHESIA Left 2/13/2019     Procedure: LEFT FOOT WOUND DEBRIDEMENT, WASHOUT AND ALL OTHER INDICATED PROCEDURES;  Surgeon: Mitch Wall MD;  Location: [facility] OR;  Service: Vascular;  Laterality: Left;  requesting 1st case start  THIS CASE 1ST PER DR WALL ON 2/1/19 @ 844AM -LO  RN PREOP 2/6/2019  HAS CARDIAC CLEARANCE    EXAMINATION UNDER ANESTHESIA Left 12/28/2018    Procedure: Exam under anesthesia, left foot debridement, left foot washout, possible left second through fifth metatarsal resection;  Surgeon: Mitch Wall MD;  Location: [facility] OR;  Service: Vascular;  Laterality: Left;  1:00pm start per julissa @ 8:58am  RN  PHONE PRE OP 12-27-18--=Pt coming from [facility] on [address]  NEED CONSENT    EXAMINATION UNDER ANESTHESIA Left 6/5/2019    Procedure: LEFT FOOT DEBRIDEMENT, WASHOUT AND ALL OTHER INDICATED PROCEDURES;  Surgeon: Mitch aWll MD;  Location: [facility] OR;  Service: Vascular;  Laterality: Left;  RN PRE OP 5-31-19------ICD------NEED CONSENT    INCISION AND DRAINAGE Left 11/14/2018    Procedure: Incision and Drainage, left lower extremity debridement, washout;  Surgeon: Mitch Wall MD;  Location: [facility] OR;  Service: Vascular;  Laterality: Left;    INCISION AND DRAINAGE Left 11/16/2018    Procedure: INCISION AND DRAINAGE;  Surgeon: Mitch Wall MD;  Location: [facility] OR;  Service: Vascular;  Laterality: Left;    INTRAOCULAR PROSTHESES INSERTION Right 9/26/2018    Procedure: INSERTION, IOL PROSTHESIS;  Surgeon: Perla Cortés MD;  Location: [address];  Service: Ophthalmology;  Laterality: Right;    PERITONEOCENTESIS N/A 3/1/2019    Procedure: PARACENTESIS, ABDOMINAL, INITIAL;  Surgeon: [facility] Diagnostic Provider;  Location: [facility] OR;  Service: Radiology;  Laterality: N/A;    PERITONEOCENTESIS N/A 3/25/2019    Procedure: PARACENTESIS, ABDOMINAL, INITIAL;  Surgeon: [facility] Diagnostic Provider;  Location: [facility] OR;  Service: Radiology;  Laterality: N/A;    PERITONEOCENTESIS N/A 7/22/2019     Procedure: PARACENTESIS, ABDOMINAL, INITIAL;  Surgeon: Dosc Diagnostic Provider;  Location: [facility] OR;  Service: Radiology;  Laterality: N/A;    PERITONEOCENTESIS N/A 8/16/2019    Procedure: PARACENTESIS, ABDOMINAL, INITIAL;  Surgeon: Dosc Diagnostic Provider;  Location: [facility] OR;  Service: Radiology;  Laterality: N/A;    PERITONEOCENTESIS N/A 9/26/2019    Procedure: PARACENTESIS, ABDOMINAL;  Surgeon: Dosc Diagnostic Provider;  Location: [facility] OR;  Service: Radiology;  Laterality: N/A;    PERITONEOCENTESIS N/A 10/11/2019    Procedure: PARACENTESIS, ABDOMINAL;  Surgeon: Dosc Diagnostic Provider;  Location: [facility] OR;  Service: Radiology;  Laterality: N/A;    PERITONEOCENTESIS N/A 10/31/2019    Procedure: PARACENTESIS, ABDOMINAL;  Surgeon: Dosc Diagnostic Provider;  Location: [facility] OR;  Service: Radiology;  Laterality: N/A;    PERITONEOCENTESIS N/A 11/18/2019    Procedure: PARACENTESIS, ABDOMINAL;  Surgeon: Dosc Diagnostic Provider;  Location: [facility] OR;  Service: Radiology;  Laterality: N/A;    PERITONEOCENTESIS N/A 12/16/2019    Procedure: PARACENTESIS, ABDOMINAL;  Surgeon: Dosc Diagnostic Provider;  Location: [facility] OR;  Service: Radiology;  Laterality: N/A;  2PM START    PERITONEOCENTESIS N/A 2/7/2020    Procedure: PARACENTESIS, ABDOMINAL;  Surgeon: Dosc Diagnostic Provider;  Location: [facility] OR;  Service: Radiology;  Laterality: N/A;  REQUESTED 10:30A START    PERITONEOCENTESIS N/A 2/19/2020    Procedure: PARACENTESIS, ABDOMINAL;  Surgeon: Dosc Diagnostic Provider;  Location: [facility] OR;  Service: Radiology;  Laterality: N/A;    PERITONEOCENTESIS N/A 2/28/2020    Procedure: PARACENTESIS, ABDOMINAL;  Surgeon: Dosc Diagnostic Provider;  Location: [facility] OR;  Service: Radiology;  Laterality: N/A;  REQUESTED 10AM START    PHACOEMULSIFICATION OF CATARACT Right 9/26/2018    Procedure: PHACOEMULSIFICATION, CATARACT;  Surgeon: Perla Cortés MD;  Location: [address];  Service: Ophthalmology;  Laterality: Right;     REPEAT ABDOMINAL PARACENTESIS N/A 2/11/2019    Procedure: PARACENTESIS, ABDOMINAL, REPEAT;  Surgeon: Dosc Diagnostic Provider;  Location: [facility] OR;  Service: Radiology;  Laterality: N/A;  1PM START    REPEAT ABDOMINAL PARACENTESIS N/A 9/12/2019    Procedure: PARACENTESIS, ABDOMINAL, REPEAT;  Surgeon: Dosc Diagnostic Provider;  Location: [facility] OR;  Service: Radiology;  Laterality: N/A;  9AM START    REPEAT ABDOMINAL PARACENTESIS N/A 12/2/2019    Procedure: PARACENTESIS, ABDOMINAL, REPEAT;  Surgeon: Dos Diagnostic Provider;  Location: [facility] OR;  Service: Radiology;  Laterality: N/A;  3PM START    REPEAT ABDOMINAL PARACENTESIS N/A 1/10/2020    Procedure: PARACENTESIS, ABDOMINAL, REPEAT;  Surgeon: Dos Diagnostic Provider;  Location: [facility] OR;  Service: Radiology;  Laterality: N/A;  1130AM START    REPEAT ABDOMINAL PARACENTESIS N/A 1/20/2020    Procedure: PARACENTESIS, ABDOMINAL, REPEAT;  Surgeon: Dos Diagnostic Provider;  Location: [facility] OR;  Service: Radiology;  Laterality: N/A;  1PM START    REPEAT ABDOMINAL PARACENTESIS N/A 1/31/2020    Procedure: PARACENTESIS, ABDOMINAL, REPEAT;  Surgeon: Dos Diagnostic Provider;  Location: [facility] OR;  Service: Radiology;  Laterality: N/A;  10AM START    REPEAT ABDOMINAL PARACENTESIS N/A 3/16/2020    Procedure: PARACENTESIS, ABDOMINAL, REPEAT;  Surgeon: Dosc Diagnostic Provider;  Location: [facility] OR;  Service: Radiology;  Laterality: N/A;  10AM START    REPEAT ABDOMINAL PARACENTESIS N/A 3/30/2020    Procedure: PARACENTESIS, ABDOMINAL, REPEAT;  Surgeon: Dos Diagnostic Provider;  Location: [facility] OR;  Service: Radiology;  Laterality: N/A;    REPEAT ABDOMINAL PARACENTESIS N/A 4/9/2020    Procedure: PARACENTESIS, ABDOMINAL, REPEAT;  Surgeon: Dosc Diagnostic Provider;  Location: [facility] OR;  Service: Radiology;  Laterality: N/A;  1130AM START    REPEAT ABDOMINAL PARACENTESIS N/A 5/8/2020    Procedure: PARACENTESIS, ABDOMINAL, REPEAT;  Surgeon: Dosc Diagnostic Provider;  Location: [facility]  OR;  Service: Radiology;  Laterality: N/A;  9AM START    REVISION OF PROCEDURE INVOLVING GLAUCOMA DRAINAGE DEVICE Left 10/2/2019    EXTRUDING BAERVELDT /WITH PERICARDIAL PATCH GRAFT ()    Right foot surgery  10/2014    TOE AMPUTATION Right     first and second    TOE AMPUTATION Left 11/16/2018    Procedure: AMPUTATION, TOES  2-5;  Surgeon: Mitch Chan MD;  Location: [facility] OR;  Service: Vascular;  Laterality: Left;    TOE AMPUTATION Left 12/5/2018    Procedure: AMPUTATION, TOE;  Surgeon: Mitch Chan MD;  Location: [facility] OR;  Service: Vascular;  Laterality: Left;  left great toe    TONSILLECTOMY       Family History   Problem Relation Age of Onset    Diabetes Mother     Heart disease Brother     No Known Problems Father     No Known Problems Sister     No Known Problems Maternal Aunt     No Known Problems Maternal Uncle     No Known Problems Paternal Aunt     No Known Problems Paternal Uncle     No Known Problems Maternal Grandmother     No Known Problems Maternal Grandfather     No Known Problems Paternal Grandmother     No Known Problems Paternal Grandfather     Anemia Neg Hx     Arrhythmia Neg Hx     Asthma Neg Hx     Clotting disorder Neg Hx     Fainting Neg Hx     Heart attack Neg Hx     Heart failure Neg Hx     Hyperlipidemia Neg Hx     Hypertension Neg Hx     Stroke Neg Hx     Atrial Septal Defect Neg Hx     Amblyopia Neg Hx     Blindness Neg Hx     Glaucoma Neg Hx     Macular degeneration Neg Hx     Retinal detachment Neg Hx     Strabismus Neg Hx      Social History     Socioeconomic History    Marital status: Single     Spouse name: Not on file    Number of children: Not on file    Years of education: 14    Highest education level: Not on file   Occupational History    Not on file   Social Needs    Financial resource strain: Not on file    Food insecurity:     Worry: Not on file     Inability: Not on file    Transportation needs:      Medical: Not on file     Non-medical: Not on file   Tobacco Use    Smoking status: Former Smoker     Packs/day: 1.00     Years: 3.00     Pack years: 3.00     Types: Cigarettes     Last attempt to quit: 1984     Years since quittin.8    Smokeless tobacco: Never Used   Substance and Sexual Activity    Alcohol use: Not Currently     Alcohol/week: 1.0 standard drinks     Types: 1 Cans of beer per week     Comment: rarely    Drug use: No    Sexual activity: Not Currently     Partners: Female   Lifestyle    Physical activity:     Days per week: Not on file     Minutes per session: Not on file    Stress: Not on file   Relationships    Social connections:     Talks on phone: Not on file     Gets together: Not on file     Attends Sabianist service: Not on file     Active member of club or organization: Not on file     Attends meetings of clubs or organizations: Not on file     Relationship status: Not on file   Other Topics Concern    Not on file   Social History Narrative    Not on file     No current facility-administered medications for this visit.   No current outpatient medications on file.    Facility-Administered Medications Ordered in Other Visits:     clindamycin 900 MG/50 ML D5W 900 mg/50 mL IVPB 900 mg, 900 mg, Intravenous, Q8H, Mitch Chan MD, 900 mg at 19 1407    lactated ringers infusion, , Intravenous, Continuous, Tam Reynolds MD    lidocaine (PF) 10 mg/ml (1%) injection 10 mg, 1 mL, Intradermal, Once, Tam Reynolds MD    REVIEW OF SYSTEMS:  General: No fevers or chills; ENT: No sore throat; Allergy and Immunology: no persistent infections; Hematological and Lymphatic: No history of bleeding or easy bruising; Endocrine: negative; Respiratory: no cough, shortness of breath, or wheezing; Cardiovascular: no chest pain or dyspnea on exertion; Gastrointestinal: no abdominal pain/back, change in bowel habits, or bloody stools; Genito-Urinary: no dysuria, trouble voiding, or  hematuria; Musculoskeletal: negative; Neurological: no TIA or stroke symptoms; Psychiatric: no nervousness, anxiety or depression.    PHYSICAL EXAM:                General appearance:  Alert, well-appearing, and in no distress.  Oriented to person, place, and time                    Neurological: Normal speech, no focal findings noted; CN II - XII grossly intact. RLE with sensation to light touch, LLE with sensation to light touch.            Musculoskeletal: Digits/nail without cyanosis/clubbing.  Strength 5/5 BLE.                    Neck: Supple, no significant adenopathy                  Chest:  Clear to auscultation, no wheezes, rales or rhonchi, symmetric air entry. No use of accessory muscles               Cardiac: Normal rate and regular rhythm, S1 and S2 normal            Abdomen: Soft, nontender, nondistended, no masses or organomegaly, no hernia     No rebound tenderness noted; bowel sounds normal     No groin adenopathy      Extremities:   2+ R femoral pulse, 2+ L femoral pulse     doppler+ R popliteal pulse, doppler+ L popliteal pulse     doppler+ R PT pulse, doppler+ L PT pulse     doppler+ R DP pulse, doppler+ L DP pulse     1+ RLE edema, 2+ LLE edema    Skin: RLE with R second toe, medial ankle, and lower limb skin breakdown, no infection or drainage; LLE with minimal brawny edema, improved/smaller foot wound with markedly improved granulation tissue, no odor, no purulence or erythema    LAB RESULTS:  No results found for: CBC  Lab Results   Component Value Date    LABPROT 11.2 05/31/2019    INR 1.1 05/31/2019     Lab Results   Component Value Date     08/22/2019    K 3.6 08/22/2019     08/22/2019    CO2 28 08/22/2019    GLU 83 08/22/2019    BUN 39 (H) 08/22/2019    CREATININE 1.1 08/22/2019    CALCIUM 8.8 08/22/2019    ANIONGAP 8 08/22/2019    EGFRNONAA >60 08/22/2019     Lab Results   Component Value Date    WBC 5.13 08/22/2019    RBC 3.73 (L) 08/22/2019    HGB 9.9 (L) 08/22/2019    HCT  32.0 (L) 08/22/2019    MCV 86 08/22/2019    MCH 26.5 (L) 08/22/2019    MCHC 30.9 (L) 08/22/2019    RDW 19.1 (H) 08/22/2019     08/22/2019    MPV 8.7 (L) 08/22/2019    GRAN 3.6 08/22/2019    GRAN 69.8 08/22/2019    LYMPH 0.5 (L) 08/22/2019    LYMPH 10.1 (L) 08/22/2019    MONO 0.7 08/22/2019    MONO 13.1 08/22/2019    EOS 0.3 08/22/2019    BASO 0.05 08/22/2019    EOSINOPHIL 6.0 08/22/2019    BASOPHIL 1.0 08/22/2019    DIFFMETHOD Automated 08/22/2019     .  Lab Results   Component Value Date    HGBA1C 7.5 (H) 12/10/2019       IMAGING:  All pertinent imaging has been reviewed and interpreted independently.    BLE arterial US 1/2019: No focal stenosis    5/16/19: BLE with no HD significant stenosis, SIDRA 0.8/1.46, left ankle pressures 60 and 171    7/29/19: BLE arterial US with approx 50% R TPT stenosis, SIDRA 0.8; LLE showing no HD significant stenosis, SIDRA 0.66    11/4/19: SIDRA 0.4/0.4, DP vessel NC bilaterally, R toe waveform normal  Right lower extremity arterial ultrasound shows no hemodynamically significant stenosis.  Pressures indicate moderate arterial occlusive disease.  Left lower extremity arterial ultrasound shows no hemodynamically significant stenosis.  Pressures indicate moderate arterial occlusive disease.    5/2020:  1. Right lower extremity pressures and waveforms indicate moderate arterial occlusive disease.  2. Left lower extremity pressures and waveforms indicate no hemodynamically significant arterial occlusive disease.    IMP/PLAN:  60 y.o. male with   Patient Active Problem List   Diagnosis    Epiretinal membrane, both eyes    Nuclear sclerotic cataract of right eye    Pseudophakia, left eye    Ptosis, left eyelid    DM type 2 with diabetic peripheral neuropathy    HLD (hyperlipidemia)    Neovascular glaucoma of both eyes    Tophaceous gout    Essential hypertension    Coronary artery disease involving native coronary artery of native heart without angina pectoris    Uncontrolled  type 2 diabetes mellitus with both eyes affected by proliferative retinopathy and macular edema, with long-term current use of insulin    Neovascular glaucoma of left eye, severe stage    Statin myopathy    Neovascular glaucoma, right eye, mild stage    Left leg cellulitis    PVD (peripheral vascular disease)    Right wrist pain    Multiple open wounds of lower leg    Hepatosplenomegaly    Type 2 diabetes mellitus with left diabetic foot ulcer    Open wound of left foot    Cellulitis of left lower extremity    Diabetic foot infection    Other ascites    Severe malnutrition    Goals of care, counseling/discussion    Alteration in skin integrity    MDR Acinetobacter baumannii infection    Takes dietary supplements    Intertriginous dermatitis associated with moisture    Debility    Infection due to multidrug-resistant Pseudomonas aeruginosa    Subacute osteomyelitis of left foot    Ascites    Acute on chronic combined systolic and diastolic congestive heart failure    Stage 3 chronic kidney disease    Atherosclerosis of left lower extremity with ulceration of midfoot    CKD stage 3 due to type 2 diabetes mellitus    Azotemia    Hyponatremia    Anemia due to multiple mechanisms    Persistent proteinuria    Hypokalemia    Open wound of foot, left, subsequent encounter    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    Ischemic cardiomyopathy    Status post abdominal paracentesis    being managed by PCP and specialists who is here today for evaluation of L foot wound.    -Improving L foot wound improved s/p debridement 2/13/19, multifactorial due to diabetes, PVD and venous insufficiency with improvement in granulation tissue on Abx due to multidrug resistent bacteria in the past s/p debridements and L tibial angioplasty x 2 with improvement in wound +granulation tissue s/p debridement 6/5/19 and WV placement; eval by Plastic Surgery re: grafting / skin grafting vs continuing  wound care and allowing to heal by secondary intention   -Cont ID rec Abx regimen  -Recommend continued wound care center care - seeing Dr. Davis next week; may benefit from hyperbaric O2 therapy  -Rec BLE Xeroform and dry kerlix wraps twice daily to shin regions with elevation of LLE above level of the heart  -Low sodium diet  -Strict glycemic control  -RTC 4-6 weeks for continued evaluation - will d/w Dr. Emily HERCULES spent 12 minutes evaluating this patient and greater than 50% of the time was spent counseling, coordinator care and discussing the plan of care.  All questions were answered and patient stated understanding with agreement with the above treatment plan.    Mitch Chan MD [facility]  Vascular and Endovascular Surgery

## 2020-05-30 DIAGNOSIS — R20.2 PARESTHESIA: ICD-10-CM

## 2020-05-31 RX ORDER — GABAPENTIN 100 MG/1
CAPSULE ORAL
Qty: 60 CAPSULE | Refills: 1 | Status: SHIPPED | OUTPATIENT
Start: 2020-05-31 | End: 2020-07-28

## 2020-06-02 ENCOUNTER — HOSPITAL ENCOUNTER (OUTPATIENT)
Dept: WOUND CARE | Facility: HOSPITAL | Age: 60
Discharge: HOME | End: 2020-06-02
Attending: FAMILY MEDICINE
Payer: MEDICAID

## 2020-06-02 VITALS — HEART RATE: 98 BPM | DIASTOLIC BLOOD PRESSURE: 70 MMHG | SYSTOLIC BLOOD PRESSURE: 140 MMHG

## 2020-06-02 DIAGNOSIS — S91.302A OPEN WOUND OF LEFT FOOT: Primary | ICD-10-CM

## 2020-06-02 PROCEDURE — 99214 OFFICE O/P EST MOD 30 MIN: CPT | Mod: ,,, | Performed by: FAMILY MEDICINE

## 2020-06-02 PROCEDURE — 99214 PR OFFICE/OUTPT VISIT, EST, LEVL IV, 30-39 MIN: ICD-10-PCS | Mod: ,,, | Performed by: FAMILY MEDICINE

## 2020-06-02 PROCEDURE — 99214 OFFICE O/P EST MOD 30 MIN: CPT

## 2020-06-02 NOTE — PROGRESS NOTES
[facility]                Progress Note    Subjective:       Patient ID: Marvin Ray is a 60 y.o. male.    Chief Complaint: No chief complaint on file.    Patient following up and doing well.  Heel has healed.  Anterior, medial, and plantar wounds improved.  Mild slough in each wound, but no debridement necessary.  No active drainage.  No odor from wounds       Review of Systems   Constitutional: Negative.    HENT: Negative.    Eyes: Negative.    Respiratory: Negative.    Cardiovascular: Positive for leg swelling. Negative for chest pain and palpitations.   Gastrointestinal: Positive for abdominal distention. Negative for abdominal pain, anal bleeding and constipation.   Skin: Positive for wound.   Neurological: Negative for dizziness, facial asymmetry, light-headedness and headaches.   Psychiatric/Behavioral: Negative.          Objective:        Physical Exam   Constitutional: He is oriented to person, place, and time. He appears well-developed and well-nourished.   HENT:   Head: Normocephalic and atraumatic.   Eyes: Pupils are equal, round, and reactive to light. Conjunctivae and EOM are normal.   Neck: Normal range of motion.   Cardiovascular: Normal rate and regular rhythm.   Pulmonary/Chest: Effort normal and breath sounds normal.   Abdominal: Soft. He exhibits distension. There is no tenderness. There is no guarding.   Musculoskeletal: He exhibits edema. He exhibits no tenderness or deformity.   Neurological: He is alert and oriented to person, place, and time. A sensory deficit is present.   Skin: Skin is warm and dry.   +DFU to right forefoot, plantar foot, and medial foot.  Mild slough present in each wound       Vitals:    06/02/20 1031   BP: (!) 140/70   Pulse: 98       Assessment:           ICD-10-CM ICD-9-CM   1. Open wound of left foot S91.302A 892.0            Wound 11/02/18 Diabetic Ulcer plantar Foot (Active)   11/02/18     Pre-existing: Yes    Primary Wound Type: Diabetic ulc   Side: Left   Orientation: plantar   Location: Foot   Wound/PI Number (optional):    Ankle-Brachial Index:    Pulses:    Removal Indication and Assessment:    Wound Outcome:    (Retired) Wound Type:    (Retired) Wound Length (cm):    (Retired) Wound Width (cm):    (Retired) Depth (cm):    Wound Description (Comments):    Removal Indications:    Wound Image   6/2/2020 10:32 AM   Wound WDL ex 6/2/2020 10:32 AM   Dressing Appearance Dry;Intact 6/2/2020 10:32 AM   Drainage Amount Moderate 6/2/2020 10:32 AM   Drainage Characteristics/Odor Serosanguineous 6/2/2020 10:32 AM   Appearance Dressing in place, unable to visualize;Red 6/2/2020 10:32 AM   Tissue loss description Full thickness 6/2/2020 10:32 AM   Red (%), Wound Tissue Color 100 % 6/2/2020 10:32 AM   Periwound Area Intact 6/2/2020 10:32 AM   Wound Edges Rolled/closed 6/2/2020 10:32 AM   Wound Length (cm) 5.5 cm 6/2/2020 10:32 AM   Wound Width (cm) 8 cm 6/2/2020 10:32 AM   Wound Depth (cm) 0.2 cm 6/2/2020 10:32 AM   Wound Volume (cm^3) 8.8 cm^3 6/2/2020 10:32 AM   Wound Surface Area (cm^2) 44 cm^2 6/2/2020 10:32 AM   Care Cleansed with:;Antimicrobial agent;Sterile normal saline 6/2/2020 10:32 AM   Dressing Applied;Other (see comments) 6/2/2020 10:32 AM   Periwound Care Topical treatment applied 6/2/2020 10:32 AM   Off Loading Off loading shoe 6/2/2020 10:32 AM   Dressing Change Due 06/16/20 6/2/2020 10:32 AM            Wound 05/08/20 1015 Left Foot (Active)   05/08/20 1015    Pre-existing: Yes   Primary Wound Type:    Side: Left   Orientation:    Location: Foot   Wound/PI Number (optional):    Ankle-Brachial Index:    Pulses:    Removal Indication and Assessment:    Wound Outcome:    (Retired) Wound Type:    (Retired) Wound Length (cm):    (Retired) Wound Width (cm):    (Retired) Depth (cm):    Wound Description (Comments):    Removal Indications:    Wound Image   6/2/2020 10:40 AM   Wound WDL ex 6/2/2020 10:40 AM   Dressing  Appearance Dry;Intact 6/2/2020 10:40 AM   Drainage Amount Moderate 6/2/2020 10:40 AM   Drainage Characteristics/Odor Serosanguineous 6/2/2020 10:40 AM   Appearance Dressing in place, unable to visualize;Red 6/2/2020 10:40 AM   Tissue loss description Full thickness 6/2/2020 10:40 AM   Red (%), Wound Tissue Color 100 % 6/2/2020 10:40 AM   Periwound Area Intact;Dry 6/2/2020 10:40 AM   Wound Edges Rolled/closed 6/2/2020 10:40 AM   Wound Length (cm) 1.8 cm 6/2/2020 10:40 AM   Wound Width (cm) 6 cm 6/2/2020 10:40 AM   Wound Depth (cm) 0.2 cm 6/2/2020 10:40 AM   Wound Volume (cm^3) 2.16 cm^3 6/2/2020 10:40 AM   Wound Surface Area (cm^2) 10.8 cm^2 6/2/2020 10:40 AM   Care Cleansed with:;Antimicrobial agent;Wound cleanser 6/2/2020 10:40 AM   Dressing Applied;Other (see comments) 6/2/2020 10:40 AM   Wound Pouch Applied 6/2/2020 10:40 AM   Dressing Change Due 06/16/20 6/2/2020 10:40 AM           Plan:                Orders Placed This Encounter   Procedures    Change dressing     Iodosorb to periphery and endoform to center of wounds cover with adaptic, aquacel, foam to heel. Football x2 cast padding, stockinet. Miguelina        Follow up in about 2 weeks (around 6/16/2020).

## 2020-06-02 NOTE — PROGRESS NOTES
[facility]                Progress Note    Subjective:       Patient ID: Marvin Ray is a 60 y.o. male.    Chief Complaint: No chief complaint on file.    Wounds much smaller with granulation migrating into wound bed.      Review of Systems      Objective:        Physical Exam    Vitals:    06/02/20 1031   BP: (!) 140/70   Pulse: 98       Assessment:           ICD-10-CM ICD-9-CM   1. Open wound of left foot S91.302A 892.0            Wound 11/02/18 Diabetic Ulcer plantar Foot (Active)   11/02/18     Pre-existing: Yes   Primary Wound Type: Diabetic ulc   Side: Left   Orientation: plantar   Location: Foot   Wound/PI Number (optional):    Ankle-Brachial Index:    Pulses:    Removal Indication and Assessment:    Wound Outcome:    (Retired) Wound Type:    (Retired) Wound Length (cm):    (Retired) Wound Width (cm):    (Retired) Depth (cm):    Wound Description (Comments):    Removal Indications:    Wound Image   6/2/2020 10:32 AM   Wound WDL ex 6/2/2020 10:32 AM   Dressing Appearance Dry;Intact 6/2/2020 10:32 AM   Drainage Amount Moderate 6/2/2020 10:32 AM   Drainage Characteristics/Odor Serosanguineous 6/2/2020 10:32 AM   Appearance Dressing in place, unable to visualize;Red 6/2/2020 10:32 AM   Tissue loss description Full thickness 6/2/2020 10:32 AM   Red (%), Wound Tissue Color 100 % 6/2/2020 10:32 AM   Periwound Area Intact 6/2/2020 10:32 AM   Wound Edges Rolled/closed 6/2/2020 10:32 AM   Wound Length (cm) 5.5 cm 6/2/2020 10:32 AM   Wound Width (cm) 8 cm 6/2/2020 10:32 AM   Wound Depth (cm) 0.2 cm 6/2/2020 10:32 AM   Wound Volume (cm^3) 8.8 cm^3 6/2/2020 10:32 AM   Wound Surface Area (cm^2) 44 cm^2 6/2/2020 10:32 AM   Care Cleansed with:;Antimicrobial agent;Sterile normal saline 6/2/2020 10:32 AM   Dressing Applied;Other (see comments) 6/2/2020 10:32 AM   Periwound Care Topical treatment applied 6/2/2020 10:32 AM   Off Loading Off loading shoe 6/2/2020 10:32 AM    Dressing Change Due 06/16/20 6/2/2020 10:32 AM            Wound 05/08/20 1015 Left Foot (Active)   05/08/20 1015    Pre-existing: Yes   Primary Wound Type:    Side: Left   Orientation:    Location: Foot   Wound/PI Number (optional):    Ankle-Brachial Index:    Pulses:    Removal Indication and Assessment:    Wound Outcome:    (Retired) Wound Type:    (Retired) Wound Length (cm):    (Retired) Wound Width (cm):    (Retired) Depth (cm):    Wound Description (Comments):    Removal Indications:    Wound Image   6/2/2020 10:40 AM   Wound WDL ex 6/2/2020 10:40 AM   Dressing Appearance Dry;Intact 6/2/2020 10:40 AM   Drainage Amount Moderate 6/2/2020 10:40 AM   Drainage Characteristics/Odor Serosanguineous 6/2/2020 10:40 AM   Appearance Dressing in place, unable to visualize;Red 6/2/2020 10:40 AM   Tissue loss description Full thickness 6/2/2020 10:40 AM   Red (%), Wound Tissue Color 100 % 6/2/2020 10:40 AM   Periwound Area Intact;Dry 6/2/2020 10:40 AM   Wound Edges Rolled/closed 6/2/2020 10:40 AM   Wound Length (cm) 1.8 cm 6/2/2020 10:40 AM   Wound Width (cm) 6 cm 6/2/2020 10:40 AM   Wound Depth (cm) 0.2 cm 6/2/2020 10:40 AM   Wound Volume (cm^3) 2.16 cm^3 6/2/2020 10:40 AM   Wound Surface Area (cm^2) 10.8 cm^2 6/2/2020 10:40 AM   Care Cleansed with:;Antimicrobial agent;Wound cleanser 6/2/2020 10:40 AM   Dressing Applied;Other (see comments) 6/2/2020 10:40 AM   Wound Pouch Applied 6/2/2020 10:40 AM   Dressing Change Due 06/16/20 6/2/2020 10:40 AM           Plan:                Orders Placed This Encounter   Procedures    Change dressing     Iodosorb to periphery and endoform to center of wounds cover with adaptic, aquacel, foam to heel. Football x2 cast padding, stockinet. Miguelina        Follow up in about 2 weeks (around 6/16/2020).

## 2020-06-03 DIAGNOSIS — I50.43 ACUTE ON CHRONIC COMBINED SYSTOLIC AND DIASTOLIC CONGESTIVE HEART FAILURE: ICD-10-CM

## 2020-06-03 RX ORDER — TORSEMIDE 20 MG/1
80 TABLET ORAL 2 TIMES DAILY
Qty: 240 TABLET | Refills: 2 | Status: SHIPPED | OUTPATIENT
Start: 2020-06-03 | End: 2020-06-15 | Stop reason: SDUPTHER

## 2020-06-05 ENCOUNTER — HOSPITAL ENCOUNTER (OUTPATIENT)
Facility: HOSPITAL | Age: 60
Discharge: HOME | End: 2020-06-05
Attending: RADIOLOGY | Admitting: RADIOLOGY
Payer: MEDICAID

## 2020-06-05 ENCOUNTER — HOSPITAL ENCOUNTER (OUTPATIENT)
Dept: INTERVENTIONAL RADIOLOGY/VASCULAR | Facility: HOSPITAL | Age: 60
Discharge: HOME | End: 2020-06-05
Attending: FAMILY MEDICINE
Payer: MEDICAID

## 2020-06-05 DIAGNOSIS — R18.8 OTHER ASCITES: ICD-10-CM

## 2020-06-05 DIAGNOSIS — R18.8 ASCITES: ICD-10-CM

## 2020-06-05 PROCEDURE — A7048 VACUUM DRAIN BOTTLE/TUBE KIT: HCPCS

## 2020-06-05 PROCEDURE — 49083 IR PARACENTESIS WITH IMAGING: ICD-10-PCS | Mod: ,,, | Performed by: RADIOLOGY

## 2020-06-05 PROCEDURE — 49083 ABD PARACENTESIS W/IMAGING: CPT | Mod: ,,, | Performed by: RADIOLOGY

## 2020-06-05 PROCEDURE — 49083 ABD PARACENTESIS W/IMAGING: CPT

## 2020-06-05 NOTE — DISCHARGE SUMMARY
Radiology Discharge Summary      Hospital Course: No complications    Admit Date: 6/5/2020  Discharge Date: 06/05/2020     Instructions Given to Patient: Yes  Diet: Resume prior diet  Activity: activity as tolerated    Description of Condition on Discharge: Stable  Vital Signs (Most Recent):      Discharge Disposition: Home    Discharge Diagnosis: CHF, CKD s/p paracentesis     Follow-up: with primary MD Vin Kong MD  Staff Radiologist  Department of Radiology  Pager: [phone]

## 2020-06-05 NOTE — PROCEDURES
Radiology Post-Procedure Note    Pre Op Diagnosis: Ascites, CKD 3, CHF  Post Op Diagnosis: Same    Procedure: Paracentesis    Procedure performed by: Vin Kong MD    Written Informed Consent Obtained: Yes  Specimen Removed: YES thin yellow fluid  Estimated Blood Loss: Minimal    Findings:   Successful paracentesis.  Albumin administered PRN per protocol.    Patient tolerated procedure well.    Vin Kong MD  Staff Radiologist  Department of Radiology  Pager: [phone]

## 2020-06-10 ENCOUNTER — PATIENT OUTREACH (OUTPATIENT)
Dept: ADMINISTRATIVE | Facility: OTHER | Age: 60
End: 2020-06-10

## 2020-06-10 NOTE — PROGRESS NOTES
CC: This 60 y.o. White male  is here for evaluation of  T2DM along with comorbidities indicated in the Visit Diagnosis section of this encounter.    HPI: Marvin Ray was diagnosed with T2DM in his late 20s. Pt was initially diet controlled, then required oral medications, then eventually required insulin.   A1c in July 2018 was high at > 14%  because he couldn't afford insulin (Lantus ~$600).      Prior visit 12/2019 arrives with his sister Chloé.  Last seen 6 months ago. Last a1c was 6% in July. States BGs have been doing well.   Continues to see Dr. Davis for wound care to left foot.   Gets paracentesis regularly every few weeks.   Plan Discontinue Novolog sliding scale. He has not been using anyway.   Continue testing blood sugar once a day.   Call with any issues including if blood sugar drops less than 80.   Will draw A1c today. If A1c is very low, will drop his Xultophy dose.   Return to clinic in 6 months with labs prior.       Interval history arrives with his sister Chloé.  Continues to see Dr. Davis in Wound Care for chronic left foot wound. States that his foot is almost completely healed.   Overall BGs are higher. He admits that he has not been monitoring his diet. He is snacking overnight - a fruit or 2 no-sugar cookies. Eating more fruit than vegetables. Drinks more cranberry juice now. Doesn't eat as much salads.       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 Also suffered cardiac arrest in Dec 2018.   Ischemic cardiomyopathy, CAD, PVD     LAST DIABETES EDUCATION: years ago     PRESCRIBED DIABETES MEDICATIONS:  Xultophy 35 units every morning, Novolog per sliding scale - Use on premeal readings: 150-200=+2, 201-250=+4; 251-300=+6; 301-350=+8, over 350=+10 units      Misses medication doses - No    DM COMPLICATIONS: nephropathy, retinopathy, peripheral neuropathy and cardiovascular disease  toe amputations (2/2 infection)    SIGNIFICANT DIABETES MED HISTORY:   Constipation  on Onglyza     SELF MONITORING BLOOD GLUCOSE: Tests BG 1x/day in the mornings, within half an hour after breakfast. BGs 132-264.     HYPOGLYCEMIC EPISODES: recalls just one episode when he felt his BG drop, presumably related to not eating carbs for dinner. Corrected with juice.      CURRENT DIET: drinks water, protein drink, sometimes coke zero, a pint of milk every other day. Eats 3 meals/day. As above.     Breakfast is 3 eggs with a slice of toast, sometimes oatmeal or grits. Likes protein shakes.         /73 (BP Location: Right arm, Patient Position: Sitting, BP Method: Large (Automatic))   Pulse 80   Wt 91.3 kg (201 lb 3.2 oz)   BMI 28.87 kg/m²       ROS:   CONSTITUTIONAL: Appetite good, denies fatigue  RESPIRATORY: No shortness of breath  CARDIAC: No chest pain  OTHER: n/a      PHYSICAL EXAM:  GENERAL: Well developed, well nourished. No acute distress.   PSYCH: AAOx3, appropriate mood and affect, conversant, casually-groomed. Judgement and insight good. Appears chronically ill. Arrives in w/c   NEURO: Cranial nerves grossly intact. Speech clear, no tremor.   CHEST: Respirations even and unlabored.         Hemoglobin A1C   Date Value Ref Range Status   12/10/2019 7.5 (H) 4.0 - 5.6 % Final     Comment:     ADA Screening Guidelines:  5.7-6.4%  Consistent with prediabetes  >or=6.5%  Consistent with diabetes  High levels of fetal hemoglobin interfere with the HbA1C  assay. Heterozygous hemoglobin variants (HbS, HgC, etc)do  not significantly interfere with this assay.   However, presence of multiple variants may affect accuracy.     07/22/2019 6.0 (H) 4.0 - 5.6 % Final     Comment:     ADA Screening Guidelines:  5.7-6.4%  Consistent with prediabetes  >or=6.5%  Consistent with diabetes  High levels of fetal hemoglobin interfere with the HbA1C  assay. Heterozygous hemoglobin variants (HbS, HgC, etc)do  not significantly interfere with this assay.   However, presence of multiple variants may affect  accuracy.     03/11/2019 6.7 (H) 4.0 - 5.6 % Final     Comment:     ADA Screening Guidelines:  5.7-6.4%  Consistent with prediabetes  >or=6.5%  Consistent with diabetes  High levels of fetal hemoglobin interfere with the HbA1C  assay. Heterozygous hemoglobin variants (HbS, HgC, etc)do  not significantly interfere with this assay.   However, presence of multiple variants may affect accuracy.             Chemistry        Component Value Date/Time     08/22/2019 0820    K 3.6 08/22/2019 0820     08/22/2019 0820    CO2 28 08/22/2019 0820    BUN 39 (H) 08/22/2019 0820    CREATININE 1.1 08/22/2019 0820    GLU 83 08/22/2019 0820        Component Value Date/Time    CALCIUM 8.8 08/22/2019 0820    ALKPHOS 135 08/22/2019 0820    AST 14 08/22/2019 0820    ALT 12 08/22/2019 0820    BILITOT 0.5 08/22/2019 0820    ESTGFRAFRICA >60 08/22/2019 0820    EGFRNONAA >60 08/22/2019 0820          Lab Results   Component Value Date    LDLCALC 71.2 10/30/2018         Lab Results   Component Value Date    MICALBCREAT 4564.0 (H) 07/27/2018           STANDARDS of CARE:        ASA:               Last eye exam:       ASSESSMENT and PLAN:    A1C GOAL: < 7 %     1. DM type 2 with diabetic peripheral neuropathy  DM worse because of diet. Needs to focus on improving diet. Avoid beverages with sugar and watch milk intake.   Labs today. A1C and BMP.   Test glucose 1-2x/day. Before meal or 2 hours after a meal.   Continue Xultophy 35 units once daily.  Return to clinic in 3 mo with a1c and lipid panel.       Hemoglobin A1C    Basic metabolic panel   2. Coronary artery disease, angina presence unspecified, unspecified vessel or lesion type, unspecified whether native or transplanted heart  Lipid Panel   3. Essential hypertension  controlled       Orders Placed This Encounter   Procedures    Hemoglobin A1C     Standing Status:   Standing     Number of Occurrences:   2     Standing Expiration Date:   8/10/2021    Basic metabolic panel      Standing Status:   Future     Standing Expiration Date:   8/10/2021    Lipid Panel     Standing Status:   Future     Standing Expiration Date:   6/11/2021        Follow up in about 3 months (around 9/11/2020).

## 2020-06-11 ENCOUNTER — OFFICE VISIT (OUTPATIENT)
Dept: ENDOCRINOLOGY | Facility: CLINIC | Age: 60
End: 2020-06-11
Payer: MEDICAID

## 2020-06-11 VITALS
DIASTOLIC BLOOD PRESSURE: 73 MMHG | SYSTOLIC BLOOD PRESSURE: 124 MMHG | BODY MASS INDEX: 28.87 KG/M2 | WEIGHT: 201.19 LBS | HEART RATE: 80 BPM

## 2020-06-11 DIAGNOSIS — I10 ESSENTIAL HYPERTENSION: ICD-10-CM

## 2020-06-11 DIAGNOSIS — I25.10 CORONARY ARTERY DISEASE, ANGINA PRESENCE UNSPECIFIED, UNSPECIFIED VESSEL OR LESION TYPE, UNSPECIFIED WHETHER NATIVE OR TRANSPLANTED HEART: ICD-10-CM

## 2020-06-11 DIAGNOSIS — E11.42 DM TYPE 2 WITH DIABETIC PERIPHERAL NEUROPATHY: Primary | ICD-10-CM

## 2020-06-11 PROCEDURE — 99999 PR PBB SHADOW E&M-EST. PATIENT-LVL IV: ICD-10-PCS | Mod: PBBFAC,,, | Performed by: NURSE PRACTITIONER

## 2020-06-11 PROCEDURE — 99214 OFFICE O/P EST MOD 30 MIN: CPT | Mod: PBBFAC,PN | Performed by: NURSE PRACTITIONER

## 2020-06-11 PROCEDURE — 99214 PR OFFICE/OUTPT VISIT, EST, LEVL IV, 30-39 MIN: ICD-10-PCS | Mod: S$PBB,,, | Performed by: NURSE PRACTITIONER

## 2020-06-11 PROCEDURE — 99214 OFFICE O/P EST MOD 30 MIN: CPT | Mod: S$PBB,,, | Performed by: NURSE PRACTITIONER

## 2020-06-11 PROCEDURE — 99999 PR PBB SHADOW E&M-EST. PATIENT-LVL IV: CPT | Mod: PBBFAC,,, | Performed by: NURSE PRACTITIONER

## 2020-06-11 NOTE — PATIENT INSTRUCTIONS
DM worse because of diet. Needs to focus on improving diet. Avoid beverages with sugar and watch milk intake.   Labs today. A1C and BMP.   Test glucose 1-2x/day. Before meal or 2 hours after a meal.   Continue Xultophy 35 units once daily.  Return to clinic in 3 mo with a1c and lipid panel.

## 2020-06-12 ENCOUNTER — TELEPHONE (OUTPATIENT)
Dept: ENDOCRINOLOGY | Facility: CLINIC | Age: 60
End: 2020-06-12

## 2020-06-12 NOTE — TELEPHONE ENCOUNTER
----- Message from Sheryl Madison NP sent at 6/12/2020  9:22 AM CDT -----  A1c is higher from 7.5 to 8.3%. Kidney function is a little lower as well, perhaps because of worse diabetes control.

## 2020-06-12 NOTE — TELEPHONE ENCOUNTER
Spoke with the pt and informed him that his A1c is higher from 7.5 to 8.3%. Kidney function is a little lower as well, perhaps because of worse diabetes control. The pt understood.

## 2020-06-15 ENCOUNTER — TELEPHONE (OUTPATIENT)
Dept: FAMILY MEDICINE | Facility: CLINIC | Age: 60
End: 2020-06-15

## 2020-06-15 DIAGNOSIS — I50.43 ACUTE ON CHRONIC COMBINED SYSTOLIC AND DIASTOLIC CONGESTIVE HEART FAILURE: ICD-10-CM

## 2020-06-15 NOTE — TELEPHONE ENCOUNTER
----- Message from Ad Berrios sent at 6/15/2020  2:07 PM CDT -----  Type: Patient Call Back    Who called:Chloé (sister)     What is the request in detail: Chloé, sister, of the staff is requesting a call back from the staff in regards to medication for the patient. She stated that he has none left. The name of the medication is: torsemide (DEMADEX) 20 MG Tab    Can the clinic reply by [facility]?no    Would the patient rather a call back or a response via [facility]? Call back     Best call back number:[phone]

## 2020-06-16 ENCOUNTER — HOSPITAL ENCOUNTER (OUTPATIENT)
Dept: WOUND CARE | Facility: HOSPITAL | Age: 60
Discharge: HOME | End: 2020-06-16
Attending: FAMILY MEDICINE
Payer: MEDICAID

## 2020-06-16 VITALS — DIASTOLIC BLOOD PRESSURE: 61 MMHG | HEART RATE: 74 BPM | TEMPERATURE: 98 F | SYSTOLIC BLOOD PRESSURE: 115 MMHG

## 2020-06-16 DIAGNOSIS — L97.529 TYPE 2 DIABETES MELLITUS WITH LEFT DIABETIC FOOT ULCER: Primary | ICD-10-CM

## 2020-06-16 DIAGNOSIS — E11.622 DIABETIC ULCER OF RIGHT LOWER LEG: ICD-10-CM

## 2020-06-16 DIAGNOSIS — E11.621 TYPE 2 DIABETES MELLITUS WITH LEFT DIABETIC FOOT ULCER: Primary | ICD-10-CM

## 2020-06-16 DIAGNOSIS — L97.919 DIABETIC ULCER OF RIGHT LOWER LEG: ICD-10-CM

## 2020-06-16 PROCEDURE — 11042 DBRDMT SUBQ TIS 1ST 20SQCM/<: CPT

## 2020-06-16 PROCEDURE — 27201912 HC WOUND CARE DEBRIDEMENT SUPPLIES

## 2020-06-16 PROCEDURE — 99214 OFFICE O/P EST MOD 30 MIN: CPT | Mod: 25,,, | Performed by: FAMILY MEDICINE

## 2020-06-16 PROCEDURE — 99214 PR OFFICE/OUTPT VISIT, EST, LEVL IV, 30-39 MIN: ICD-10-PCS | Mod: 25,,, | Performed by: FAMILY MEDICINE

## 2020-06-16 PROCEDURE — 11042 DEBRIDEMENT: ICD-10-PCS | Mod: ,,, | Performed by: FAMILY MEDICINE

## 2020-06-16 PROCEDURE — 11042 DBRDMT SUBQ TIS 1ST 20SQCM/<: CPT | Mod: ,,, | Performed by: FAMILY MEDICINE

## 2020-06-16 RX ORDER — TORSEMIDE 20 MG/1
80 TABLET ORAL 2 TIMES DAILY
Qty: 240 TABLET | Refills: 2 | Status: SHIPPED | OUTPATIENT
Start: 2020-06-16 | End: 2020-11-05 | Stop reason: SDUPTHER

## 2020-06-16 NOTE — PROGRESS NOTES
[facility]                Progress Note    Subjective:       Patient ID: Marvin Ray is a 60 y.o. male.    Chief Complaint: No chief complaint on file.    New wounds on right leg under darco strap and on third toe. Iodosorb to R. Left foot continues to have epethileal tissue migrating in from edges.  New wounds to right foot that are likely secondary to him hitting his foot and leg with bijal from left foot.  Excess slough in RLE wound.  Left foot doing well without excess slough requiring debridement      Review of Systems   Constitutional: Negative.    HENT: Negative.    Eyes: Negative.    Respiratory: Negative.    Cardiovascular: Negative.    Musculoskeletal: Negative.    Skin: Positive for wound.   Neurological: Positive for numbness.   Psychiatric/Behavioral: Negative.          Objective:        Physical Exam  Constitutional:       General: He is not in acute distress.     Appearance: Normal appearance. He is not toxic-appearing.   HENT:      Head: Normocephalic and atraumatic.      Nose: Nose normal.      Mouth/Throat:      Mouth: Mucous membranes are moist.      Pharynx: Oropharynx is clear.   Eyes:      Extraocular Movements: Extraocular movements intact.      Conjunctiva/sclera: Conjunctivae normal.      Pupils: Pupils are equal, round, and reactive to light.   Neck:      Musculoskeletal: Normal range of motion and neck supple.   Cardiovascular:      Rate and Rhythm: Normal rate and regular rhythm.   Pulmonary:      Effort: Pulmonary effort is normal. No respiratory distress.      Breath sounds: No stridor.   Abdominal:      General: There is no distension.      Palpations: Abdomen is soft.      Tenderness: There is abdominal tenderness.   Skin:     General: Skin is warm and dry.      Comments: +diabetic ulcer to RLE and left foot.  RLE wound has excess slough requiring debridement   Neurological:      Mental Status: He is alert and oriented to person,  place, and time.      Sensory: Sensory deficit present.   Psychiatric:         Mood and Affect: Mood normal.         Behavior: Behavior normal.           Vitals:    06/16/20 1009   BP: 115/61   Pulse: 74   Temp: 98 °F (36.7 °C)     Debridement    Date/Time: 6/16/2020 4:45 PM  Performed by: Rubén Davis MD  Authorized by: Rubén Davis MD     Consent Done?:  Yes (Written)    Preparation: Patient was prepped and draped in usual sterile fashion    Local anesthesia used?: No      Wound Details:    Location:  Right leg       Length (cm):  1       Area (sq cm):  2       Width (cm):  2       Percent Debrided (%):  100       Depth (cm):  0.2       Total Area Debrided (sq cm):  2    Depth of debridement:  Subcutaneous tissue    Tissue debrided:  Dermis, Epidermis and Subcutaneous    Devitalized tissue debrided:  Biofilm, Exudate, Fibrin and Slough    Instruments:  Curette    Bleeding:  Minimal  Hemostasis Achieved: Yes    Method Used:  Pressure  Patient tolerance:  Patient tolerated the procedure well with no immediate complications      Assessment:           ICD-10-CM ICD-9-CM   1. Type 2 diabetes mellitus with left diabetic foot ulcer  E11.621 250.80    L97.529 707.15   2. Diabetic ulcer of right lower leg  E11.622 250.80    L97.919 707.10            Wound 11/02/18 Diabetic Ulcer plantar Foot (Active)   11/02/18     Pre-existing: Yes   Primary Wound Type: Diabetic ulc   Side: Left   Orientation: plantar   Location: Foot   Wound/PI Number (optional):    Ankle-Brachial Index:    Pulses:    Removal Indication and Assessment:    Wound Outcome:    (Retired) Wound Type:    (Retired) Wound Length (cm):    (Retired) Wound Width (cm):    (Retired) Depth (cm):    Wound Description (Comments):    Removal Indications:    Wound Image   06/16/20 1000   Wound WDL ex 06/16/20 1000   Dressing Appearance Moist drainage 06/16/20 1000   Drainage Amount Copious 06/16/20 1000   Drainage Characteristics/Odor Green 06/16/20 1000    Appearance Red 06/16/20 1000   Tissue loss description Full thickness 06/16/20 1000   Red (%), Wound Tissue Color 100 % 06/16/20 1000   Wound Edges Defined 06/16/20 1000   Wound Length (cm) 1 cm 06/16/20 1000   Wound Width (cm) 1.6 cm 06/16/20 1000   Wound Depth (cm) 0.3 cm 06/16/20 1000   Wound Volume (cm^3) 0.48 cm^3 06/16/20 1000   Wound Surface Area (cm^2) 1.6 cm^2 06/16/20 1000   Care Cleansed with:;Antimicrobial agent;Sterile normal saline 06/16/20 1000   Dressing Changed 06/16/20 1000   Dressing Change Due 06/23/20 06/16/20 1000            Wound 05/08/20 1015 Left Foot (Active)   05/08/20 1015    Pre-existing: Yes   Primary Wound Type:    Side: Left   Orientation:    Location: Foot   Wound/PI Number (optional):    Ankle-Brachial Index:    Pulses:    Removal Indication and Assessment:    Wound Outcome:    (Retired) Wound Type:    (Retired) Wound Length (cm):    (Retired) Wound Width (cm):    (Retired) Depth (cm):    Wound Description (Comments):    Removal Indications:    Wound Image   06/16/20 1000   Wound WDL ex 06/16/20 1000   Dressing Appearance Moist drainage 06/16/20 1000   Drainage Amount Copious 06/16/20 1000   Drainage Characteristics/Odor Green 06/16/20 1000   Appearance Tan 06/16/20 1000   Tissue loss description Full thickness 06/16/20 1000   Red (%), Wound Tissue Color 100 % 06/16/20 1000   Periwound Area Intact 06/16/20 1000   Wound Edges Defined 06/16/20 1000   Wound Length (cm) 4 cm 06/16/20 1000   Wound Width (cm) 1.6 cm 06/16/20 1000   Wound Depth (cm) 0.3 cm 06/16/20 1000   Wound Volume (cm^3) 1.92 cm^3 06/16/20 1000   Wound Surface Area (cm^2) 6.4 cm^2 06/16/20 1000   Care Cleansed with:;Antimicrobial agent;Sterile normal saline 06/16/20 1000   Dressing Changed 06/16/20 1000   Dressing Change Due 06/23/20 06/16/20 1000            Wound 06/16/20 1037 Venous Ulcer Right medial Leg (Active)   06/16/20 1037    Pre-existing: No   Primary Wound Type: Venous ulcer   Side: Right   Orientation:  medial   Location: Leg   Wound/PI Number (optional):    Ankle-Brachial Index:    Pulses:    Removal Indication and Assessment:    Wound Outcome:    (Retired) Wound Type:    (Retired) Wound Length (cm):    (Retired) Wound Width (cm):    (Retired) Depth (cm):    Wound Description (Comments):    Removal Indications:    Wound Image   06/16/20 1000   Wound WDL ex 06/16/20 1000   Dressing Appearance Open to air 06/16/20 1000   Drainage Amount Scant 06/16/20 1000   Drainage Characteristics/Odor Green 06/16/20 1000   Appearance Tan 06/16/20 1000   Tissue loss description Partial thickness 06/16/20 1000   Yellow (%), Wound Tissue Color 100 % 06/16/20 1000   Wound Edges Defined 06/16/20 1000   Wound Length (cm) 1 cm 06/16/20 1000   Wound Width (cm) 2 cm 06/16/20 1000   Wound Depth (cm) 0.2 cm 06/16/20 1000   Wound Volume (cm^3) 0.4 cm^3 06/16/20 1000   Wound Surface Area (cm^2) 2 cm^2 06/16/20 1000   Care Cleansed with:;Antimicrobial agent;Wound cleanser 06/16/20 1000   Dressing Applied 06/16/20 1000   Dressing Change Due 06/23/20 06/16/20 1000            Incision/Site 05/21/20 1131 Right Lower quadrant lateral other (see comments) (Active)   05/21/20 1131   Present Prior to Hospital Arrival?: No   Side: Right   Location: Lower quadrant   Orientation: lateral   Incision Type: other (see comments)   Closure Method: Other (see comments)   Additional Comments: PARACENTESIS; Bandage applied   Removal Indication and Assessment:    Wound Outcome:    Removal Indications:    Wound Image   06/16/20 1100   Incision WDL ex 06/16/20 1100   Dressing Appearance Intact;Moist drainage 06/16/20 1100   Drainage Amount Small 06/16/20 1100   Drainage Characteristics/Odor Clear 06/16/20 1100   Red (%), Wound Tissue Color 100 % 06/16/20 1100   Periwound Area Intact 06/16/20 1100   Wound Edges Defined 06/16/20 1100   Wound Length (cm) 1.5 cm 06/16/20 1100   Wound Width (cm) 1.5 cm 06/16/20 1100   Wound Depth (cm) 0.1 cm 06/16/20 1100   Wound Volume  (cm^3) 0.22 cm^3 06/16/20 1100   Wound Surface Area (cm^2) 2.25 cm^2 06/16/20 1100   Care Cleansed with:;Antimicrobial agent;Sterile normal saline 06/16/20 1100   Dressing Change Due 06/16/20 06/16/20 1100            Incision/Site 06/16/20 0930 Right Toe, third anterior (Active)   06/16/20 0930   Present Prior to Hospital Arrival?: Yes   Side: Right   Location: Toe, third   Orientation: anterior   Incision Type:    Closure Method:    Additional Comments:    Removal Indication and Assessment:    Wound Outcome:    Removal Indications:    Wound Image   06/16/20 1000   Incision WDL ex 06/16/20 1000   Dressing Appearance Dried drainage 06/16/20 1000   Drainage Amount Moderate 06/16/20 1000   Red (%), Wound Tissue Color 100 % 06/16/20 1000   Periwound Area Intact 06/16/20 1000   Wound Edges Defined 06/16/20 1000   Wound Length (cm) 0.5 cm 06/16/20 1000   Wound Width (cm) 0.5 cm 06/16/20 1000   Wound Depth (cm) 0.5 cm 06/16/20 1000   Wound Volume (cm^3) 0.12 cm^3 06/16/20 1000   Wound Surface Area (cm^2) 0.25 cm^2 06/16/20 1000   Care Cleansed with:;Antimicrobial agent;Sterile normal saline 06/16/20 1000   Dressing Changed 06/16/20 1000   Dressing Change Due 06/23/20 06/16/20 1000           Plan:                Orders Placed This Encounter   Procedures    Debridement     This order was created via procedure documentation     Standing Status:   Standing     Number of Occurrences:   1    Change dressing     Comments    Adia and aquacel ag foam and football with stockinet to left leg wounds covered with mextra, long foam over instep to protect from strap of darco.  R foot wounds covered with Iodosorb, mextra and long foam to protect from darco shoe.  Two new womens medium darcos given as have longer strap        Follow up in about 2 weeks (around 6/30/2020).   Rubén Davis MD

## 2020-06-18 PROBLEM — L97.919 DIABETIC ULCER OF RIGHT LOWER LEG: Status: ACTIVE | Noted: 2020-06-18

## 2020-06-18 PROBLEM — E11.622 DIABETIC ULCER OF RIGHT LOWER LEG: Status: ACTIVE | Noted: 2020-06-18

## 2020-06-23 ENCOUNTER — TELEPHONE (OUTPATIENT)
Dept: FAMILY MEDICINE | Facility: CLINIC | Age: 60
End: 2020-06-23

## 2020-06-23 DIAGNOSIS — R18.8 OTHER ASCITES: Primary | ICD-10-CM

## 2020-06-23 NOTE — TELEPHONE ENCOUNTER
----- Message from Anum Mata sent at 6/23/2020  3:29 PM CDT -----  Type: Patient Call Back    Who called: Self     What is the request in detail: patient need a new order to do a paracentesis. Please call     Can the clinic reply by [facility]? No     Would the patient rather a call back or a response via [facility]?  Call     Best call back number: [phone]

## 2020-06-25 ENCOUNTER — HOSPITAL ENCOUNTER (OUTPATIENT)
Dept: INTERVENTIONAL RADIOLOGY/VASCULAR | Facility: HOSPITAL | Age: 60
Discharge: HOME | End: 2020-06-25
Attending: FAMILY MEDICINE
Payer: MEDICAID

## 2020-06-25 ENCOUNTER — HOSPITAL ENCOUNTER (OUTPATIENT)
Facility: HOSPITAL | Age: 60
Discharge: HOME | End: 2020-06-25
Attending: RADIOLOGY | Admitting: RADIOLOGY
Payer: MEDICAID

## 2020-06-25 VITALS — DIASTOLIC BLOOD PRESSURE: 73 MMHG | SYSTOLIC BLOOD PRESSURE: 128 MMHG

## 2020-06-25 DIAGNOSIS — R18.8 OTHER ASCITES: ICD-10-CM

## 2020-06-25 PROCEDURE — 49083 ABD PARACENTESIS W/IMAGING: CPT | Mod: ,,, | Performed by: RADIOLOGY

## 2020-06-25 PROCEDURE — A7048 VACUUM DRAIN BOTTLE/TUBE KIT: HCPCS

## 2020-06-25 PROCEDURE — 49083 ABD PARACENTESIS W/IMAGING: CPT

## 2020-06-25 PROCEDURE — 49083 IR PARACENTESIS NO IMAGING: ICD-10-PCS | Mod: ,,, | Performed by: RADIOLOGY

## 2020-06-25 NOTE — PROGRESS NOTES
Paracentesis completed. 5.2L removed from lateral RUQ; tolerated well. Site dressed with gauze and tegaderm, CDI, no redness, swelling or hematoma noted. VSS. IVIS. Family present at time of discharge to drive patient home. Verbalizes understanding of discharge instructions. Will call for next appointment when ready.

## 2020-06-25 NOTE — H&P
Radiology History & Physical      SUBJECTIVE:     Chief Complaint: Recurrent painful, tense ascites     History of Present Illness:  Marvin Ray is a 58 y.o. male with PMHx of CKD III and combined systolic/diastolic CHF complicated by recurrent abdominal distension and pain 2/2 recurrent painful, tense ascites without TTP on PE to suggest SBP requiring frequent  therapeutic LVP.      A new outpatient IR consult placed for US-guided therapeutic paracentesis.      Past Medical History:   Diagnosis Date    Arthritis     Cellulitis     CKD (chronic kidney disease), stage III     Coronary artery disease     Diabetes mellitus     Diabetic retinopathy     Diabetic ulcer of left foot     Glaucoma     Gout     Hyperlipemia     Hypertension     ICD (implantable cardioverter-defibrillator) in place 11/02/2018    Left chest    Non-pressure chronic ulcer of other part of left foot with fat layer exposed 10/23/2018    PVD (peripheral vascular disease)     Type 2 diabetes mellitus with left diabetic foot ulcer 10/29/2018    Unsteady gait     uses a wheelchair     Past Surgical History:   Procedure Laterality Date    AHMED GLAUCOMA IMPLANT Left 2011    DONE AT [facility]    AORTOGRAPHY WITH SERIALOGRAPHY Left 4/30/2019    Procedure: AORTOGRAM, WITH SERIALOGRAPHY, LEFT LOWER EXTREMITY, POSSIBLE INTERVENTION;  Surgeon: Mitch Chan MD;  Location: [facility];  Service: Vascular;  Laterality: Left;  RN PRE OP 4-23-19.  NO H & P, No Cosent  [place]    BAERVELDT GLAUCOMA IMPLANT Left 2012    WITH CATARACT EXTRACTION//DONE AT [facility]    CARDIAC CATHETERIZATION Left 05/2016    CARDIAC DEFIBRILLATOR PLACEMENT Left 11/02/2018    CATARACT EXTRACTION W/  INTRAOCULAR LENS IMPLANT Left 2012    WITH BAERVEDT//DONE AT [facility]    CATARACT EXTRACTION W/  INTRAOCULAR LENS IMPLANT Right 09/26/2018    COMPLEX ()    CB DESTRUCTION WITH CYCLO G6 Left 02/15/2017        CYST REMOVAL      DEBRIDEMENT OF  FOOT  12/28/2018    Procedure: DEBRIDEMENT, FOOT;  Surgeon: Mitch Wall MD;  Location: [facility] OR;  Service: Vascular;;    DEBRIDEMENT OF FOOT  6/5/2019    Procedure: DEBRIDEMENT, FOOT;  Surgeon: Mitch Wall MD;  Location: [facility] OR;  Service: Vascular;;    EXAMINATION UNDER ANESTHESIA Left 12/5/2018    Procedure: Exam under anesthesia, left foot debridement, washout and all other indicated procedures;  Surgeon: Mitch Wall MD;  Location: [facility] OR;  Service: Vascular;  Laterality: Left;  1030AM START  RN PREOP 12/3/2018-----AICD  SEEN BY DR JAMES 12/4    EXAMINATION UNDER ANESTHESIA Left 2/13/2019    Procedure: LEFT FOOT WOUND DEBRIDEMENT, WASHOUT AND ALL OTHER INDICATED PROCEDURES;  Surgeon: Mitch Wall MD;  Location: [facility] OR;  Service: Vascular;  Laterality: Left;  requesting 1st case start  THIS CASE 1ST PER DR WALL ON 2/1/19 @ 844AM -[place]  RN PREOP 2/6/2019  HAS CARDIAC CLEARANCE    EXAMINATION UNDER ANESTHESIA Left 12/28/2018    Procedure: Exam under anesthesia, left foot debridement, left foot washout, possible left second through fifth metatarsal resection;  Surgeon: Mitch Wall MD;  Location: [facility] OR;  Service: Vascular;  Laterality: Left;  1:00pm start per julissa @ 8:58am  RN  PHONE PRE OP 12-27-18--=Pt coming from [facility] on [address]  NEED CONSENT    EXAMINATION UNDER ANESTHESIA Left 6/5/2019    Procedure: LEFT FOOT DEBRIDEMENT, WASHOUT AND ALL OTHER INDICATED PROCEDURES;  Surgeon: Mitch Wall MD;  Location: [facility] OR;  Service: Vascular;  Laterality: Left;  RN PRE OP 5-31-19------ICD------NEED CONSENT    INCISION AND DRAINAGE Left 11/14/2018    Procedure: Incision and Drainage, left lower extremity debridement, washout;  Surgeon: Mitch Wall MD;  Location: [facility] OR;  Service: Vascular;  Laterality: Left;    INCISION AND DRAINAGE Left 11/16/2018    Procedure: INCISION AND DRAINAGE;  Surgeon: Mitch Wall MD;  Location: [facility] OR;   Service: Vascular;  Laterality: Left;    INTRAOCULAR PROSTHESES INSERTION Right 9/26/2018    Procedure: INSERTION, IOL PROSTHESIS;  Surgeon: Perla Cortés MD;  Location: [address];  Service: Ophthalmology;  Laterality: Right;    PERITONEOCENTESIS N/A 3/1/2019    Procedure: PARACENTESIS, ABDOMINAL, INITIAL;  Surgeon: Dosc Diagnostic Provider;  Location: [facility] OR;  Service: Radiology;  Laterality: N/A;    PERITONEOCENTESIS N/A 3/25/2019    Procedure: PARACENTESIS, ABDOMINAL, INITIAL;  Surgeon: Dosc Diagnostic Provider;  [facility];  Service: Radiology;  Laterality: N/A;    PERITONEOCENTESIS N/A 7/22/2019    Procedure: PARACENTESIS, ABDOMINAL, INITIAL;  Surgeon: Dosc Diagnostic Provider;  Location: [facility] OR;  Service: Radiology;  Laterality: N/A;    PERITONEOCENTESIS N/A 8/16/2019    Procedure: PARACENTESIS, ABDOMINAL, INITIAL;  Surgeon: Dosc Diagnostic Provider;  Location: [facility] OR;  Service: Radiology;  Laterality: N/A;    PERITONEOCENTESIS N/A 9/26/2019    Procedure: PARACENTESIS, ABDOMINAL;  Surgeon: Dosc Diagnostic Provider;  Location: [facility] OR;  Service: Radiology;  Laterality: N/A;    PERITONEOCENTESIS N/A 10/11/2019    Procedure: PARACENTESIS, ABDOMINAL;  Surgeon: Dosc Diagnostic Provider;  Location: [facility] OR;  Service: Radiology;  Laterality: N/A;    PERITONEOCENTESIS N/A 10/31/2019    Procedure: PARACENTESIS, ABDOMINAL;  Surgeon: Dosc Diagnostic Provider;  Location: [facility] OR;  Service: Radiology;  Laterality: N/A;    PERITONEOCENTESIS N/A 11/18/2019    Procedure: PARACENTESIS, ABDOMINAL;  Surgeon: Dosc Diagnostic Provider;  [facility];  Service: Radiology;  Laterality: N/A;    PERITONEOCENTESIS N/A 12/16/2019    Procedure: PARACENTESIS, ABDOMINAL;  Surgeon: Dosc Diagnostic Provider;  Location: [facility] OR;  Service: Radiology;  Laterality: N/A;  2PM START    PERITONEOCENTESIS N/A 2/7/2020    Procedure: PARACENTESIS, ABDOMINAL;  Surgeon: Dosc Diagnostic Provider;  Location: [facility] OR;   Service: Radiology;  Laterality: N/A;  REQUESTED 10:30A START    PERITONEOCENTESIS N/A 2/19/2020    Procedure: PARACENTESIS, ABDOMINAL;  Surgeon: Dos Diagnostic Provider;  Location: [facility] OR;  Service: Radiology;  Laterality: N/A;    PERITONEOCENTESIS N/A 2/28/2020    Procedure: PARACENTESIS, ABDOMINAL;  Surgeon: Dos Diagnostic Provider;  Location: [facility] OR;  Service: Radiology;  Laterality: N/A;  REQUESTED 10AM START    PHACOEMULSIFICATION OF CATARACT Right 9/26/2018    Procedure: PHACOEMULSIFICATION, CATARACT;  Surgeon: Perla Cortés MD;  [facility];  Service: Ophthalmology;  Laterality: Right;    REPEAT ABDOMINAL PARACENTESIS N/A 2/11/2019    Procedure: PARACENTESIS, ABDOMINAL, REPEAT;  Surgeon: [facility] Diagnostic Provider;  Location: [facility] OR;  Service: Radiology;  Laterality: N/A;  1PM START    REPEAT ABDOMINAL PARACENTESIS N/A 9/12/2019    Procedure: PARACENTESIS, ABDOMINAL, REPEAT;  Surgeon: [facility] Diagnostic Provider;  Location: [facility] OR;  Service: Radiology;  Laterality: N/A;  9AM START    REPEAT ABDOMINAL PARACENTESIS N/A 12/2/2019    Procedure: PARACENTESIS, ABDOMINAL, REPEAT;  Surgeon: Dos Diagnostic Provider;  Location: [facility] OR;  Service: Radiology;  Laterality: N/A;  3PM START    REPEAT ABDOMINAL PARACENTESIS N/A 1/10/2020    Procedure: PARACENTESIS, ABDOMINAL, REPEAT;  Surgeon: Dos Diagnostic Provider;  Location: [facility] OR;  Service: Radiology;  Laterality: N/A;  1130AM START    REPEAT ABDOMINAL PARACENTESIS N/A 1/20/2020    Procedure: PARACENTESIS, ABDOMINAL, REPEAT;  Surgeon: Dos Diagnostic Provider;  Location: [facility] OR;  Service: Radiology;  Laterality: N/A;  1PM START    REPEAT ABDOMINAL PARACENTESIS N/A 1/31/2020    Procedure: PARACENTESIS, ABDOMINAL, REPEAT;  Surgeon: [facility] Diagnostic Provider;  Location: [facility] OR;  Service: Radiology;  Laterality: N/A;  10AM START    REPEAT ABDOMINAL PARACENTESIS N/A 3/16/2020    Procedure: PARACENTESIS, ABDOMINAL, REPEAT;  Surgeon: Dosc  Diagnostic Provider;  Location: [facility] OR;  Service: Radiology;  Laterality: N/A;  10AM START    REPEAT ABDOMINAL PARACENTESIS N/A 3/30/2020    Procedure: PARACENTESIS, ABDOMINAL, REPEAT;  Surgeon: [facility] Diagnostic Provider;  Location: [facility] OR;  Service: Radiology;  Laterality: N/A;    REPEAT ABDOMINAL PARACENTESIS N/A 4/9/2020    Procedure: PARACENTESIS, ABDOMINAL, REPEAT;  Surgeon: [facility] Diagnostic Provider;  Location: [facility] OR;  Service: Radiology;  Laterality: N/A;  1130AM START    REPEAT ABDOMINAL PARACENTESIS N/A 5/8/2020    Procedure: PARACENTESIS, ABDOMINAL, REPEAT;  Surgeon: [facility] Diagnostic Provider;  Location: [facility] OR;  Service: Radiology;  Laterality: N/A;  9AM START    REPEAT ABDOMINAL PARACENTESIS N/A 5/21/2020    Procedure: PARACENTESIS, ABDOMINAL, REPEAT;  Surgeon: [facility] Diagnostic Provider;  Location: [facility] OR;  Service: Radiology;  Laterality: N/A;  10AM START    REPEAT ABDOMINAL PARACENTESIS N/A 6/5/2020    Procedure: PARACENTESIS, ABDOMINAL, REPEAT;  Surgeon: [facility] Diagnostic Provider;  Location: [facility] OR;  Service: Radiology;  Laterality: N/A;  NOON START    REVISION OF PROCEDURE INVOLVING GLAUCOMA DRAINAGE DEVICE Left 10/2/2019    EXTRUDING BAERVELDT /WITH PERICARDIAL PATCH GRAFT ()    Right foot surgery  10/2014    TOE AMPUTATION Right     first and second    TOE AMPUTATION Left 11/16/2018    Procedure: AMPUTATION, TOES  2-5;  Surgeon: Mitch Chan MD;  Location: [facility] OR;  Service: Vascular;  Laterality: Left;    TOE AMPUTATION Left 12/5/2018    Procedure: AMPUTATION, TOE;  Surgeon: Mitch Chan MD;  Location: [facility] OR;  Service: Vascular;  Laterality: Left;  left great toe    TONSILLECTOMY       Home Meds:   Prior to Admission medications    Medication Sig Start Date End Date Taking? Authorizing Provider   allopurinoL (ZYLOPRIM) 300 MG tablet TAKE 1 TABLET(300 MG) BY MOUTH EVERY DAY 6/15/20   Rubén Davis MD   aspirin (ECOTRIN) 81 MG EC tablet Take 81 mg by  "mouth once daily.    Historical Provider, MD   bisacodyl (DULCOLAX) 5 mg EC tablet Take 5 mg by mouth daily as needed for Constipation.    Historical Provider, MD   brimonidine 0.2% (ALPHAGAN) 0.2 % Drop Place 1 drop into both eyes 3 (three) times daily. 12/12/19   Perla Cortés MD   carvediloL (COREG) 3.125 MG tablet TAKE 1 TABLET(3.125 MG) BY MOUTH TWICE DAILY 6/21/20   Rubén Davis MD   coenzyme Q10 100 mg capsule Take 100 mg by mouth every morning.    Historical Provider, MD   dorzolamide-timolol 2-0.5% (COSOPT) 22.3-6.8 mg/mL ophthalmic solution Place 1 drop into both eyes 3 (three) times daily. 12/12/19   Perla Cortés MD   ezetimibe (ZETIA) 10 mg tablet TAKE 1 TABLET(10 MG) BY MOUTH EVERY DAY 4/16/20   Rubén Davis MD   fish oil-omega-3 fatty acids 300-1,000 mg capsule Take 1 capsule by mouth 2 (two) times daily. 1/23/19   Sara Ching MD   gabapentin (NEURONTIN) 100 MG capsule TAKE 2 CAPSULES(200 MG) BY MOUTH EVERY EVENING 5/31/20   Rubén Davis MD   insulin degludec-liraglutide (XULTOPHY 100/3.6) 100 unit-3.6 mg /mL (3 mL) InPn Inject 34 Units into the skin once daily. 1/28/20   Sheryl Madison NP   MULTIVIT,THER IRON,CA,FA & MIN (MULTIVITAMIN) Tab Take 1 tablet by mouth every morning.     Historical Provider, MD   pen needle, diabetic 31 gauge x 1/4" Ndle Use as directed  Patient not taking: Reported on 5/21/2020 8/11/16   Mike Bass MD   torsemide (DEMADEX) 20 MG Tab Take 4 tablets (80 mg total) by mouth 2 (two) times daily. 6/16/20 7/16/20  Rubén Davis MD     Anticoagulants/Antiplatelets: no anticoagulation    Allergies:   Review of patient's allergies indicates:   Allergen Reactions    Statins-hmg-coa reductase inhibitors      Generalized Pain    Onglyza [saxagliptin]     Penicillins Rash     Sedation History:  no adverse reactions     Review of Systems:   Hematological: no known coagulopathies  Respiratory: positive for - orthopnea and shortness of " breath  Cardiovascular: positive for - dyspnea on exertion, orthopnea and shortness of breath  Gastrointestinal: positive for - abdominal pain and distension  Genito-Urinary: no dysuria, trouble voiding, or hematuria  Musculoskeletal: negative  Neurological: no TIA or stroke symptoms       OBJECTIVE:     Vital Signs (Most Recent)  BP: 120/62 (06/25/20 1335)    Physical Exam:  General: no acute distress  Mental Status: alert and oriented to person, place and time  HEENT: normocephalic, atraumatic  Chest: mild labored breathing  Heart: regular heart rate  Abdomen: +distended. No TTP/r/g.  Extremity: moves all extremities    Laboratory  Lab Results   Component Value Date    INR 1.1 05/31/2019       Lab Results   Component Value Date    WBC 5.13 08/22/2019    HGB 9.9 (L) 08/22/2019    HCT 32.0 (L) 08/22/2019    MCV 86 08/22/2019     08/22/2019      Lab Results   Component Value Date     (H) 06/11/2020     06/11/2020    K 4.3 06/11/2020     06/11/2020    CO2 29 06/11/2020    BUN 56 (H) 06/11/2020    CREATININE 1.5 (H) 06/11/2020    CALCIUM 9.0 06/11/2020    MG 2.1 05/31/2019    ALT 12 08/22/2019    AST 14 08/22/2019    ALBUMIN 2.8 (L) 08/22/2019    BILITOT 0.5 08/22/2019     ASSESSMENT/PLAN:     59 y/o M with CKD III and combined systolic/diastolic CHF complicated by recurrent abdominal distension and pain 2/2 recurrent painful, tense ascites without TTP on PE to suggest SBP, requiring frequent large-volume therapeutic paracentesis.      1. Will attempt U/S-guided therapeutic paracentesis with local anesthetic only and albumin infusion post PRN per institutional protocol.      Risks (including, but not limited to, pain, bleeding, infection, damage to nearby structures, failure to obtain sufficient material for a diagnosis, the need for additional procedures, and death), benefits, and alternatives were discussed with the patient. All questions were answered to the best of my abilities. The  patient wishes to proceed with the procedure. Written informed consent was obtained.     Thank you for considering IR for the care of your patient.      Zach Cha MD  Interventional Radiology

## 2020-06-25 NOTE — DISCHARGE SUMMARY
Radiology Discharge Summary      Hospital Course: No complications    Admit Date: 6/25/2020  Discharge Date: 06/25/2020     Instructions Given to Patient: Yes    Diet: Resume prior diet     Activity: activity as tolerated    Description of Condition on Discharge: Stable    Vital Signs (Most Recent): BP: 132/78 (06/25/20 1400)    Discharge Disposition: Home    Discharge Diagnosis:   58 y/o M with h/o recurrent painful, tense ascites s/p successful U/S-guided therapeutic LVP with local anesthetic only and albumin infusion post PRN as indicated per institutional protocol. Patient tolerated the procedure well. No immediate post-procedural complications noted.      Thank you for considering IR for the care of your patient.      Zach Cha MD  Interventional Radiology

## 2020-06-25 NOTE — BRIEF OP NOTE
Radiology Post-Procedure Note     Pre Op Diagnosis: Recurrent painful, tense ascites  Post Op Diagnosis: Same     Procedure: U/S-guided therapeutic LVP     Procedure performed by: Zach Cha MD     Written Informed Consent Obtained: Yes  Specimen Removed: YES, 5.2-L of thin, straw-colored ascitic fluid  Estimated Blood Loss: none     Findings:   1. Successful therapeutic large-volume paracentesis with local anesthetic only and albumin infusion post PRN as indicated per institutional protocol. Patient tolerated the procedure well. No immediate post-procedural complications noted.       Thank you for considering IR for the care of your patient.      Zach Cha MD  Interventional Radiology

## 2020-06-30 ENCOUNTER — HOSPITAL ENCOUNTER (OUTPATIENT)
Dept: WOUND CARE | Facility: HOSPITAL | Age: 60
Discharge: HOME | End: 2020-06-30
Attending: FAMILY MEDICINE
Payer: MEDICAID

## 2020-06-30 VITALS — TEMPERATURE: 98 F | HEART RATE: 77 BPM | DIASTOLIC BLOOD PRESSURE: 65 MMHG | SYSTOLIC BLOOD PRESSURE: 124 MMHG

## 2020-06-30 DIAGNOSIS — S91.302D OPEN WOUND OF FOOT, LEFT, SUBSEQUENT ENCOUNTER: ICD-10-CM

## 2020-06-30 DIAGNOSIS — E11.622 DIABETIC ULCER OF RIGHT LOWER LEG: Primary | ICD-10-CM

## 2020-06-30 DIAGNOSIS — L97.919 DIABETIC ULCER OF RIGHT LOWER LEG: Primary | ICD-10-CM

## 2020-06-30 PROCEDURE — 99214 PR OFFICE/OUTPT VISIT, EST, LEVL IV, 30-39 MIN: ICD-10-PCS | Mod: 25,,, | Performed by: FAMILY MEDICINE

## 2020-06-30 PROCEDURE — 27201912 HC WOUND CARE DEBRIDEMENT SUPPLIES: Performed by: FAMILY MEDICINE

## 2020-06-30 PROCEDURE — 99214 OFFICE O/P EST MOD 30 MIN: CPT | Mod: 25,,, | Performed by: FAMILY MEDICINE

## 2020-06-30 PROCEDURE — 11042 DBRDMT SUBQ TIS 1ST 20SQCM/<: CPT | Mod: ,,, | Performed by: FAMILY MEDICINE

## 2020-06-30 PROCEDURE — 11042 DEBRIDEMENT: ICD-10-PCS | Mod: ,,, | Performed by: FAMILY MEDICINE

## 2020-06-30 PROCEDURE — 11044 DBRDMT BONE 1ST 20 SQ CM/<: CPT | Performed by: FAMILY MEDICINE

## 2020-06-30 NOTE — PROGRESS NOTES
[facility]                Progress Note    Subjective:       Patient ID: Marvin Ray is a 60 y.o. male.    Chief Complaint: No chief complaint on file.    Minimum drainage. Looks better with islands of new tissue growing from edges. Right Darco strap site full of slough. No pain in wound.  No maceration.  There is excess slough in medial right lower extremity venous ulcer.  No odor from wound.  He didn't drain through dressings.  Sister continues to change dressings a directed.      Review of Systems   Constitutional: Negative.    Eyes: Negative.    Respiratory: Negative.    Cardiovascular: Positive for leg swelling.   Gastrointestinal: Positive for abdominal distention. Negative for abdominal pain and anal bleeding.   Skin: Positive for wound.   Psychiatric/Behavioral: Negative.          Objective:        Physical Exam  Constitutional:       Appearance: Normal appearance.   HENT:      Head: Normocephalic and atraumatic.      Right Ear: External ear normal.      Left Ear: External ear normal.      Nose: Nose normal.      Mouth/Throat:      Mouth: Mucous membranes are moist.      Pharynx: Oropharynx is clear.   Eyes:      Pupils: Pupils are equal, round, and reactive to light.   Neck:      Musculoskeletal: Normal range of motion.   Cardiovascular:      Rate and Rhythm: Normal rate and regular rhythm.      Pulses: Normal pulses.      Heart sounds: Normal heart sounds.   Abdominal:      General: There is distension.      Palpations: Abdomen is soft.      Tenderness: There is no abdominal tenderness. There is no guarding.   Musculoskeletal:      Right lower leg: Edema present.      Left lower leg: Edema present.   Skin:     General: Skin is warm and dry.      Comments: +DFU's to left foot  + venous ulcer to right medial lower extremity with excess slough   Neurological:      Mental Status: He is alert.           Vitals:    06/30/20 1024   BP: 124/65   Pulse: 77  "  Temp: 97.7 °F (36.5 °C)     Debridement    Date/Time: 6/30/2020 11:19 AM  Performed by: Rubén Davis MD  Authorized by: Rubén Davis MD     Time out: Immediately prior to procedure a "time out" was called to verify the correct patient, procedure, equipment, support staff and site/side marked as required.    Consent Done?:  Yes (Written)  Local anesthesia used?: Yes    Local anesthetic:  Topical anesthetic  Anesthetic total (ml):  5    Wound Details:    Location:  Right leg    Type of Debridement:  Excisional       Length (cm):  2       Area (sq cm):  4       Width (cm):  2       Percent Debrided (%):  100       Depth (cm):  0.1       Total Area Debrided (sq cm):  4    Depth of debridement:  Subcutaneous tissue    Tissue debrided:  Dermis, Epidermis and Subcutaneous    Devitalized tissue debrided:  Biofilm, Fibrin and Slough    Instruments:  Curette    Bleeding:  Minimal  Hemostasis Achieved: Yes    Method Used:  Pressure  Patient tolerance:  Patient tolerated the procedure well with no immediate complications      Assessment:           ICD-10-CM ICD-9-CM   1. Diabetic ulcer of right lower leg  E11.622 250.80    L97.919 707.10   2. Open wound of foot, left, subsequent encounter  S91.302D V58.89     892.0            Wound 11/02/18 Diabetic Ulcer plantar Foot (Active)   11/02/18     Pre-existing: Yes   Primary Wound Type: Diabetic ulc   Side: Left   Orientation: plantar   Location: Foot   Wound Number (optional):    Ankle-Brachial Index:    Pulses:    Removal Indication and Assessment:    Wound Outcome:    (Retired) Wound Type:    (Retired) Wound Length (cm):    (Retired) Wound Width (cm):    (Retired) Depth (cm):    Wound Description (Comments):    Removal Indications:    Wound Image   06/30/20 1000   Wound WDL ex 06/30/20 1000   Dressing Appearance Dry 06/30/20 1000   Drainage Amount Scant 06/30/20 1000   Drainage Characteristics/Odor Clear 06/30/20 1000   Appearance Red 06/30/20 1000   Tissue loss " description Partial thickness 06/30/20 1000   Red (%), Wound Tissue Color 90 % 06/30/20 1000   Yellow (%), Wound Tissue Color 10 % 06/30/20 1000   Periwound Area Intact 06/30/20 1000   Wound Edges Defined 06/30/20 1000   Wound Length (cm) 9 cm 06/30/20 1000   Wound Width (cm) 5.3 cm 06/30/20 1000   Wound Depth (cm) 0.2 cm 06/30/20 1000   Wound Volume (cm^3) 9.54 cm^3 06/30/20 1000   Wound Surface Area (cm^2) 47.7 cm^2 06/30/20 1000   Care Cleansed with:;Antimicrobial agent;Sterile normal saline 06/30/20 1000   Dressing Changed 06/30/20 1000   Dressing Change Due 07/07/20 06/30/20 1000            Wound 05/08/20 1015 Left Foot (Active)   05/08/20 1015    Pre-existing: Yes   Primary Wound Type:    Side: Left   Orientation:    Location: Foot   Wound Number (optional):    Ankle-Brachial Index:    Pulses:    Removal Indication and Assessment:    Wound Outcome:    (Retired) Wound Type:    (Retired) Wound Length (cm):    (Retired) Wound Width (cm):    (Retired) Depth (cm):    Wound Description (Comments):    Removal Indications:    Wound Image   06/30/20 1000   Wound WDL ex 06/30/20 1000   Dressing Appearance Dry;Intact 06/30/20 1000   Drainage Amount Small 06/30/20 1000   Drainage Characteristics/Odor Clear 06/30/20 1000   Appearance Red 06/30/20 1000   Tissue loss description Partial thickness 06/30/20 1000   Red (%), Wound Tissue Color 100 % 06/30/20 1000   Periwound Area Intact 06/30/20 1000   Wound Edges Defined 06/30/20 1000   Wound Length (cm) 6 cm 06/30/20 1000   Wound Width (cm) 2.2 cm 06/30/20 1000   Wound Depth (cm) 0.2 cm 06/30/20 1000   Wound Volume (cm^3) 2.64 cm^3 06/30/20 1000   Wound Surface Area (cm^2) 13.2 cm^2 06/30/20 1000   Care Cleansed with:;Antimicrobial agent;Sterile normal saline 06/30/20 1000   Dressing Changed 06/30/20 1000   Dressing Change Due 07/07/20 06/30/20 1000            Wound 06/16/20 1037 Venous Ulcer Right medial Leg (Active)   06/16/20 1037    Pre-existing: No   Primary Wound Type:  Venous ulcer   Side: Right   Orientation: medial   Location: Leg   Wound Number (optional):    Ankle-Brachial Index:    Pulses:    Removal Indication and Assessment:    Wound Outcome:    (Retired) Wound Type:    (Retired) Wound Length (cm):    (Retired) Wound Width (cm):    (Retired) Depth (cm):    Wound Description (Comments):    Removal Indications:    Wound Image   06/30/20 1000   Wound WDL ex 06/30/20 1000   Dressing Appearance Open to air 06/30/20 1000   Appearance Tan 06/30/20 1000   Tissue loss description Partial thickness 06/30/20 1000   Yellow (%), Wound Tissue Color 100 % 06/30/20 1000   Periwound Area Dry 06/30/20 1000   Wound Edges Defined 06/30/20 1000   Wound Length (cm) 2 cm 06/30/20 1000   Wound Width (cm) 2 cm 06/30/20 1000   Wound Depth (cm) 0.1 cm 06/30/20 1000   Wound Volume (cm^3) 0.4 cm^3 06/30/20 1000   Wound Surface Area (cm^2) 4 cm^2 06/30/20 1000   Care Cleansed with:;Antimicrobial agent;Sterile normal saline 06/30/20 1000   Dressing Changed 06/30/20 1000   Off Loading Off loading shoe 06/30/20 1000           Plan:                Orders Placed This Encounter   Procedures    Debridement     This order was created via procedure documentation     Standing Status:   Standing     Number of Occurrences:   1        Follow up in about 2 weeks (around 7/14/2020).     Rubén Davis MD

## 2020-07-01 ENCOUNTER — TELEPHONE (OUTPATIENT)
Dept: WOUND CARE | Facility: HOSPITAL | Age: 60
End: 2020-07-01

## 2020-07-01 DIAGNOSIS — L97.909 VENOUS STASIS ULCER WITH EDEMA OF LOWER LEG: ICD-10-CM

## 2020-07-01 DIAGNOSIS — I83.009 VENOUS STASIS ULCER WITH EDEMA OF LOWER LEG: ICD-10-CM

## 2020-07-01 DIAGNOSIS — I83.899 VENOUS STASIS ULCER WITH EDEMA OF LOWER LEG: ICD-10-CM

## 2020-07-01 DIAGNOSIS — R60.9 VENOUS STASIS ULCER WITH EDEMA OF LOWER LEG: ICD-10-CM

## 2020-07-01 DIAGNOSIS — E11.621 DIABETIC FOOT ULCERS: Primary | ICD-10-CM

## 2020-07-01 DIAGNOSIS — L97.509 DIABETIC FOOT ULCERS: Primary | ICD-10-CM

## 2020-07-08 ENCOUNTER — PATIENT OUTREACH (OUTPATIENT)
Dept: ADMINISTRATIVE | Facility: OTHER | Age: 60
End: 2020-07-08

## 2020-07-09 ENCOUNTER — OFFICE VISIT (OUTPATIENT)
Dept: VASCULAR SURGERY | Facility: CLINIC | Age: 60
End: 2020-07-09
Payer: MEDICAID

## 2020-07-09 VITALS
BODY MASS INDEX: 28.81 KG/M2 | HEART RATE: 74 BPM | OXYGEN SATURATION: 98 % | SYSTOLIC BLOOD PRESSURE: 116 MMHG | DIASTOLIC BLOOD PRESSURE: 64 MMHG | WEIGHT: 201.25 LBS | HEIGHT: 70 IN

## 2020-07-09 DIAGNOSIS — I70.245 ATHEROSCLEROSIS OF NATIVE ARTERY OF LEFT LOWER EXTREMITY WITH ULCERATION OF OTHER PART OF FOOT: Primary | ICD-10-CM

## 2020-07-09 DIAGNOSIS — I70.233 ATHEROSCLEROSIS OF NATIVE ARTERY OF RIGHT LOWER EXTREMITY WITH ULCERATION OF ANKLE: ICD-10-CM

## 2020-07-09 PROCEDURE — 99999 PR PBB SHADOW E&M-EST. PATIENT-LVL IV: ICD-10-PCS | Mod: PBBFAC,,, | Performed by: SURGERY

## 2020-07-09 PROCEDURE — 99214 OFFICE O/P EST MOD 30 MIN: CPT | Mod: PBBFAC | Performed by: SURGERY

## 2020-07-09 PROCEDURE — 99215 OFFICE O/P EST HI 40 MIN: CPT | Mod: S$PBB,,, | Performed by: SURGERY

## 2020-07-09 PROCEDURE — 99999 PR PBB SHADOW E&M-EST. PATIENT-LVL IV: CPT | Mod: PBBFAC,,, | Performed by: SURGERY

## 2020-07-09 PROCEDURE — 99215 PR OFFICE/OUTPT VISIT, EST, LEVL V, 40-54 MIN: ICD-10-PCS | Mod: S$PBB,,, | Performed by: SURGERY

## 2020-07-09 NOTE — LETTER
July 9, 2020        Rubén Davis MD  [address] [facility] [address]             [facility]  [address]  [facility] [address]  Phone: [phone]  Fax: [phone]   Patient: Marvin Ray   MR Number: [id number]   YOB: 1960   Date of Visit: 7/9/2020       Dear Dr. Davis:    Thank you for referring Marvin Ray to me for evaluation. Below are the relevant portions of my assessment and plan of care.            If you have questions, please do not hesitate to call me. I look forward to following Marvin along with you.    Sincerely,      Mitch Chan MD           CC  No Recipients

## 2020-07-09 NOTE — PROGRESS NOTES
Mitch Chan MD [facility]                         07/09/2020    HPI:  Marvin Ray is a 60 y.o. male with   Patient Active Problem List   Diagnosis    Epiretinal membrane, both eyes    Nuclear sclerotic cataract of right eye    Pseudophakia, left eye    Ptosis, left eyelid    DM type 2 with diabetic peripheral neuropathy    HLD (hyperlipidemia)    Neovascular glaucoma of both eyes    Tophaceous gout    Essential hypertension    Coronary artery disease involving native coronary artery of native heart without angina pectoris    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 edema, with long-term current use of insulin    Neovascular glaucoma of left eye, severe stage    Statin myopathy    Neovascular glaucoma, right eye, mild stage    Left leg cellulitis    PVD (peripheral vascular disease)    Right wrist pain    Multiple open wounds of lower leg    Hepatosplenomegaly    Type 2 diabetes mellitus with left diabetic foot ulcer    Open wound of left foot    Cellulitis of left lower extremity    Diabetic foot infection    Other ascites    Severe malnutrition    Goals of care, counseling/discussion    Alteration in skin integrity    MDR Acinetobacter baumannii infection    Takes dietary supplements    Intertriginous dermatitis associated with moisture    Debility    Infection due to multidrug-resistant Pseudomonas aeruginosa    Subacute osteomyelitis of left foot    Ascites    Acute on chronic combined systolic and diastolic congestive heart failure    Stage 3 chronic kidney disease    Atherosclerosis of left lower extremity with ulceration of midfoot    CKD stage 3 due to type 2 diabetes mellitus    Azotemia    Hyponatremia    Anemia due to multiple mechanisms    Persistent proteinuria    Hypokalemia    Open wound of foot, left, subsequent encounter    Disorder  due to insertion of glaucoma drainage device    Ischemic cardiomyopathy    Status post abdominal paracentesis    Diabetic ulcer of right lower leg    being managed by PCP and specialists who is here today for evaluation of L foot wound.  Seen in hospital last mo with LLE cellulitis.  Treated with Abx.  Also had paracentesis for ascites with negative w/u per family.  Patient states location is L foot occurring for 1-2 mo, worsening foot wound although improvement in edema and erythema.  Associated signs and symptoms include pain and edema.  Quality is aching and severity is 5/10.  Symptoms began 10/2018 spontaneously with blistering and severe edema.  Alleviating factors include wound care and elevation.  Worsening factors include pressure.    Tobacco use: denies    1/4/19: s/p LLE angioplasty and multiple subsequent OR and bedside debridements.  On IV Abx per culture results.  Glucose better controlled.  No fevers.  Receiving local wound care with Santyl.    1/14/19:  Cont to receive local wound care.  Pseudomonas in tissue and bone cultures multidrug resistant other than aminoglycoside; d/w Dr. Velez with high risk of ESRD with 6 weeks of aminoglycoside treatment.  No F/C.    1/31/19:  Pt without complaints.  Was started back on Bactrim.  Family doing wound care.    2/28/19: S/p L foot debridement 2/13/19.  Started on Meropenem.    3/28/19:  No complaints.  S/p paracentesis and pt feels better.    5/16/19:  S/p L peroneal and AT angioplasty, wound healing well.    9/30/19:  S/p L foot debridement 6/5/19. WV placed.    11/2019:  States wound is healing well.  Did not see Plastic Surgery clinic.  Cont wound care center and home health wound care.    2/2020: States wound nearly fully healed.  Receiving wound care daily.    5/2020:  Wounds healing.  Seeing Dr. Davis next week.    7/2020:  Doing well with L foot wound, has developed a R medial maleolus wound due to trauma    Past Medical History:   Diagnosis Date     Arthritis     Cellulitis     CKD (chronic kidney disease), stage III     Coronary artery disease     Diabetes mellitus     Diabetic retinopathy     Diabetic ulcer of left foot     Glaucoma     Gout     Hyperlipemia     Hypertension     ICD (implantable cardioverter-defibrillator) in place 11/02/2018    Left chest    Non-pressure chronic ulcer of other part of left foot with fat layer exposed 10/23/2018    PVD (peripheral vascular disease)     Type 2 diabetes mellitus with left diabetic foot ulcer 10/29/2018    Unsteady gait     uses a wheelchair     Past Surgical History:   Procedure Laterality Date    AHMED GLAUCOMA IMPLANT Left 2011    DONE AT [facility]    AORTOGRAPHY WITH SERIALOGRAPHY Left 4/30/2019    Procedure: AORTOGRAM, WITH SERIALOGRAPHY, LEFT LOWER EXTREMITY, POSSIBLE INTERVENTION;  Surgeon: Mitch Chan MD;  Location: [facility] OR;  Service: Vascular;  Laterality: Left;  RN PRE OP 4-23-19.  NO H & P, No Cosent  [place]    BAERVELDT GLAUCOMA IMPLANT Left 2012    WITH CATARACT EXTRACTION//DONE AT [facility]    CARDIAC CATHETERIZATION Left 05/2016    CARDIAC DEFIBRILLATOR PLACEMENT Left 11/02/2018    CATARACT EXTRACTION W/  INTRAOCULAR LENS IMPLANT Left 2012    WITH BAERVEDT//DONE AT [facility]    CATARACT EXTRACTION W/  INTRAOCULAR LENS IMPLANT Right 09/26/2018    COMPLEX ()    CB DESTRUCTION WITH CYCLO G6 Left 02/15/2017        CYST REMOVAL      DEBRIDEMENT OF FOOT  12/28/2018    Procedure: DEBRIDEMENT, FOOT;  Surgeon: Mitch Chan MD;  Location: [facility] OR;  Service: Vascular;;    DEBRIDEMENT OF FOOT  6/5/2019    Procedure: DEBRIDEMENT, FOOT;  Surgeon: Mitch Chan MD;  Location: [facility] OR;  Service: Vascular;;    EXAMINATION UNDER ANESTHESIA Left 12/5/2018    Procedure: Exam under anesthesia, left foot debridement, washout and all other indicated procedures;  Surgeon: Mitch Chan MD;  Location: [facility] OR;  Service: Vascular;  Laterality: Left;   1030AM START  RN PREOP 12/3/2018-----AICD  SEEN BY DR JAMES 12/4    EXAMINATION UNDER ANESTHESIA Left 2/13/2019    Procedure: LEFT FOOT WOUND DEBRIDEMENT, WASHOUT AND ALL OTHER INDICATED PROCEDURES;  Surgeon: Mitch Wall MD;  Location: [facility] OR;  Service: Vascular;  Laterality: Left;  requesting 1st case start  THIS CASE 1ST PER DR WALL ON 2/1/19 @ 844AM -[place]  RN PREOP 2/6/2019  HAS CARDIAC CLEARANCE    EXAMINATION UNDER ANESTHESIA Left 12/28/2018    Procedure: Exam under anesthesia, left foot debridement, left foot washout, possible left second through fifth metatarsal resection;  Surgeon: Mitch Wall MD;  Location: [facility] OR;  Service: Vascular;  Laterality: Left;  1:00pm start per julissa @ 8:58am  RN  PHONE PRE OP 12-27-18--=Pt coming from [facility] on [address]  NEED CONSENT    EXAMINATION UNDER ANESTHESIA Left 6/5/2019    Procedure: LEFT FOOT DEBRIDEMENT, WASHOUT AND ALL OTHER INDICATED PROCEDURES;  Surgeon: Mitch Wall MD;  Location: [facility] OR;  Service: Vascular;  Laterality: Left;  RN PRE OP 5-31-19------ICD------NEED CONSENT    INCISION AND DRAINAGE Left 11/14/2018    Procedure: Incision and Drainage, left lower extremity debridement, washout;  Surgeon: Mitch Wall MD;  Location: [facility] OR;  Service: Vascular;  Laterality: Left;    INCISION AND DRAINAGE Left 11/16/2018    Procedure: INCISION AND DRAINAGE;  Surgeon: Mitch Wall MD;  Location: [facility] OR;  Service: Vascular;  Laterality: Left;    INTRAOCULAR PROSTHESES INSERTION Right 9/26/2018    Procedure: INSERTION, IOL PROSTHESIS;  Surgeon: Perla Cortés MD;  Location: [facility] OR 1ST FLR;  Service: Ophthalmology;  Laterality: Right;    PERITONEOCENTESIS N/A 3/1/2019    Procedure: PARACENTESIS, ABDOMINAL, INITIAL;  Surgeon: Jovani Diagnostic Provider;  Location: [facility] OR;  Service: Radiology;  Laterality: N/A;    PERITONEOCENTESIS N/A 3/25/2019    Procedure: PARACENTESIS, ABDOMINAL, INITIAL;  Surgeon: Winston  Diagnostic Provider;  Location: [facility] OR;  Service: Radiology;  Laterality: N/A;    PERITONEOCENTESIS N/A 7/22/2019    Procedure: PARACENTESIS, ABDOMINAL, INITIAL;  Surgeon: Dosc Diagnostic Provider;  Location: [facility] OR;  Service: Radiology;  Laterality: N/A;    PERITONEOCENTESIS N/A 8/16/2019    Procedure: PARACENTESIS, ABDOMINAL, INITIAL;  Surgeon: Dosc Diagnostic Provider;  Location: [facility] OR;  Service: Radiology;  Laterality: N/A;    PERITONEOCENTESIS N/A 9/26/2019    Procedure: PARACENTESIS, ABDOMINAL;  Surgeon: Dosc Diagnostic Provider;  Location: [facility] OR;  Service: Radiology;  Laterality: N/A;    PERITONEOCENTESIS N/A 10/11/2019    Procedure: PARACENTESIS, ABDOMINAL;  Surgeon: Dosc Diagnostic Provider;  Location: [facility];  Service: Radiology;  Laterality: N/A;    PERITONEOCENTESIS N/A 10/31/2019    Procedure: PARACENTESIS, ABDOMINAL;  Surgeon: Dosc Diagnostic Provider;  Location: [facility] OR;  Service: Radiology;  Laterality: N/A;    PERITONEOCENTESIS N/A 11/18/2019    Procedure: PARACENTESIS, ABDOMINAL;  Surgeon: Dosc Diagnostic Provider;  Location: [facility] OR;  Service: Radiology;  Laterality: N/A;    PERITONEOCENTESIS N/A 12/16/2019    Procedure: PARACENTESIS, ABDOMINAL;  Surgeon: Dosc Diagnostic Provider;  Location: [facility] OR;  Service: Radiology;  Laterality: N/A;  2PM START    PERITONEOCENTESIS N/A 2/7/2020    Procedure: PARACENTESIS, ABDOMINAL;  Surgeon: Dosc Diagnostic Provider;  Location: [facility] OR;  Service: Radiology;  Laterality: N/A;  REQUESTED 10:30A START    PERITONEOCENTESIS N/A 2/19/2020    Procedure: PARACENTESIS, ABDOMINAL;  Surgeon: Dosc Diagnostic Provider;  [facility]: [facility];  Service: Radiology;  Laterality: N/A;    PERITONEOCENTESIS N/A 2/28/2020    Procedure: PARACENTESIS, ABDOMINAL;  Surgeon: Dosc Diagnostic Provider;  Location: [facility] OR;  Service: Radiology;  Laterality: N/A;  REQUESTED 10AM START    PHACOEMULSIFICATION OF CATARACT Right 9/26/2018    Procedure: PHACOEMULSIFICATION,  CATARACT;  Surgeon: Perla Cortés MD;  [facility];  Service: Ophthalmology;  Laterality: Right;    REPEAT ABDOMINAL PARACENTESIS N/A 2/11/2019    Procedure: PARACENTESIS, ABDOMINAL, REPEAT;  Surgeon: Dosc Diagnostic Provider;  Location: [facility] OR;  Service: Radiology;  Laterality: N/A;  1PM START    REPEAT ABDOMINAL PARACENTESIS N/A 9/12/2019    Procedure: PARACENTESIS, ABDOMINAL, REPEAT;  Surgeon: Dosc Diagnostic Provider;  Location: [facility] OR;  Service: Radiology;  Laterality: N/A;  9AM START    REPEAT ABDOMINAL PARACENTESIS N/A 12/2/2019    Procedure: PARACENTESIS, ABDOMINAL, REPEAT;  Surgeon: Dosc Diagnostic Provider;  Location: [facility] OR;  Service: Radiology;  Laterality: N/A;  3PM START    REPEAT ABDOMINAL PARACENTESIS N/A 1/10/2020    Procedure: PARACENTESIS, ABDOMINAL, REPEAT;  Surgeon: Dosc Diagnostic Provider;  Location: [facility] OR;  Service: Radiology;  Laterality: N/A;  1130AM START    REPEAT ABDOMINAL PARACENTESIS N/A 1/20/2020    Procedure: PARACENTESIS, ABDOMINAL, REPEAT;  Surgeon: Dosc Diagnostic Provider;  Location: [facility] OR;  Service: Radiology;  Laterality: N/A;  1PM START    REPEAT ABDOMINAL PARACENTESIS N/A 1/31/2020    Procedure: PARACENTESIS, ABDOMINAL, REPEAT;  Surgeon: Dosc Diagnostic Provider;  Location: [facility] OR;  Service: Radiology;  Laterality: N/A;  10AM START    REPEAT ABDOMINAL PARACENTESIS N/A 3/16/2020    Procedure: PARACENTESIS, ABDOMINAL, REPEAT;  Surgeon: Dosc Diagnostic Provider;  Location: [facility] OR;  Service: Radiology;  Laterality: N/A;  10AM START    REPEAT ABDOMINAL PARACENTESIS N/A 3/30/2020    Procedure: PARACENTESIS, ABDOMINAL, REPEAT;  Surgeon: Dosc Diagnostic Provider;  Location: [facility];  Service: Radiology;  Laterality: N/A;    REPEAT ABDOMINAL PARACENTESIS N/A 4/9/2020    Procedure: PARACENTESIS, ABDOMINAL, REPEAT;  Surgeon: Dosc Diagnostic Provider;  Location: [facility] OR;  Service: Radiology;  Laterality: N/A;  1130AM START    REPEAT ABDOMINAL  PARACENTESIS N/A 5/8/2020    Procedure: PARACENTESIS, ABDOMINAL, REPEAT;  Surgeon: [facility] Diagnostic Provider;  Location: [facility] OR;  Service: Radiology;  Laterality: N/A;  9AM START    REPEAT ABDOMINAL PARACENTESIS N/A 5/21/2020    Procedure: PARACENTESIS, ABDOMINAL, REPEAT;  Surgeon: [facility] Diagnostic Provider;  Location: [facility] OR;  Service: Radiology;  Laterality: N/A;  10AM START    REPEAT ABDOMINAL PARACENTESIS N/A 6/5/2020    Procedure: PARACENTESIS, ABDOMINAL, REPEAT;  Surgeon: [facility] Diagnostic Provider;  Location: [facility] OR;  Service: Radiology;  Laterality: N/A;  NOON START    REVISION OF PROCEDURE INVOLVING GLAUCOMA DRAINAGE DEVICE Left 10/2/2019    EXTRUDING BAERVELDT /WITH PERICARDIAL PATCH GRAFT ()    Right foot surgery  10/2014    TOE AMPUTATION Right     first and second    TOE AMPUTATION Left 11/16/2018    Procedure: AMPUTATION, TOES  2-5;  Surgeon: Mitch Chan MD;  Location: [facility] OR;  Service: Vascular;  Laterality: Left;    TOE AMPUTATION Left 12/5/2018    Procedure: AMPUTATION, TOE;  Surgeon: Mitch Chan MD;  Location: [facility] OR;  Service: Vascular;  Laterality: Left;  left great toe    TONSILLECTOMY       Family History   Problem Relation Age of Onset    Diabetes Mother     Heart disease Brother     No Known Problems Father     No Known Problems Sister     No Known Problems Maternal Aunt     No Known Problems Maternal Uncle     No Known Problems Paternal Aunt     No Known Problems Paternal Uncle     No Known Problems Maternal Grandmother     No Known Problems Maternal Grandfather     No Known Problems Paternal Grandmother     No Known Problems Paternal Grandfather     Anemia Neg Hx     Arrhythmia Neg Hx     Asthma Neg Hx     Clotting disorder Neg Hx     Fainting Neg Hx     Heart attack Neg Hx     Heart failure Neg Hx     Hyperlipidemia Neg Hx     Hypertension Neg Hx     Stroke Neg Hx     Atrial Septal Defect Neg Hx     Amblyopia Neg Hx      Blindness Neg Hx     Glaucoma Neg Hx     Macular degeneration Neg Hx     Retinal detachment Neg Hx     Strabismus Neg Hx      Social History     Socioeconomic History    Marital status: Single     Spouse name: Not on file    Number of children: Not on file    Years of education: 14    Highest education level: Not on file   Occupational History    Not on file   Social Needs    Financial resource strain: Not on file    Food insecurity     Worry: Not on file     Inability: Not on file    Transportation needs     Medical: Not on file     Non-medical: Not on file   Tobacco Use    Smoking status: Former Smoker     Packs/day: 1.00     Years: 3.00     Pack years: 3.00     Types: Cigarettes     Quit date: 1984     Years since quittin.0    Smokeless tobacco: Never Used   Substance and Sexual Activity    Alcohol use: Not Currently     Alcohol/week: 1.0 standard drinks     Types: 1 Cans of beer per week     Comment: rarely    Drug use: No    Sexual activity: Not Currently     Partners: Female   Lifestyle    Physical activity     Days per week: Not on file     Minutes per session: Not on file    Stress: Not on file   Relationships    Social connections     Talks on phone: Not on file     Gets together: Not on file     Attends Voodoo service: Not on file     Active member of club or organization: Not on file     Attends meetings of clubs or organizations: Not on file     Relationship status: Not on file   Other Topics Concern    Not on file   Social History Narrative    Not on file       Current Outpatient Medications:     allopurinoL (ZYLOPRIM) 300 MG tablet, TAKE 1 TABLET(300 MG) BY MOUTH EVERY DAY, Disp: 30 tablet, Rfl: 3    aspirin (ECOTRIN) 81 MG EC tablet, Take 81 mg by mouth once daily., Disp: , Rfl:     bisacodyl (DULCOLAX) 5 mg EC tablet, Take 5 mg by mouth daily as needed for Constipation., Disp: , Rfl:     brimonidine 0.2% (ALPHAGAN) 0.2 % Drop, Place 1 drop into both eyes 3  "(three) times daily., Disp: 10 mL, Rfl: 11    carvediloL (COREG) 3.125 MG tablet, TAKE 1 TABLET(3.125 MG) BY MOUTH TWICE DAILY, Disp: 60 tablet, Rfl: 1    coenzyme Q10 100 mg capsule, Take 100 mg by mouth every morning., Disp: , Rfl:     dorzolamide-timolol 2-0.5% (COSOPT) 22.3-6.8 mg/mL ophthalmic solution, Place 1 drop into both eyes 3 (three) times daily., Disp: 1 Bottle, Rfl: 11    ezetimibe (ZETIA) 10 mg tablet, TAKE 1 TABLET(10 MG) BY MOUTH EVERY DAY, Disp: 90 tablet, Rfl: 0    fish oil-omega-3 fatty acids 300-1,000 mg capsule, Take 1 capsule by mouth 2 (two) times daily., Disp: 60 capsule, Rfl: 3    gabapentin (NEURONTIN) 100 MG capsule, TAKE 2 CAPSULES(200 MG) BY MOUTH EVERY EVENING, Disp: 60 capsule, Rfl: 1    insulin degludec-liraglutide (XULTOPHY 100/3.6) 100 unit-3.6 mg /mL (3 mL) InPn, Inject 34 Units into the skin once daily., Disp: 15 Syringe, Rfl: 1    MULTIVIT,THER IRON,CA,FA & MIN (MULTIVITAMIN) Tab, Take 1 tablet by mouth every morning. , Disp: , Rfl:     torsemide (DEMADEX) 20 MG Tab, Take 4 tablets (80 mg total) by mouth 2 (two) times daily., Disp: 240 tablet, Rfl: 2    pen needle, diabetic 31 gauge x 1/4" Ndle, Use as directed (Patient not taking: Reported on 5/21/2020), Disp: 100 each, Rfl: 11  No current facility-administered medications for this visit.     Facility-Administered Medications Ordered in Other Visits:     clindamycin 900 MG/50 ML D5W 900 mg/50 mL IVPB 900 mg, 900 mg, Intravenous, Q8H, Mitch Chan MD, 900 mg at 06/05/19 1407    lactated ringers infusion, , Intravenous, Continuous, Tam Reynolds MD    lidocaine (PF) 10 mg/ml (1%) injection 10 mg, 1 mL, Intradermal, Once, Tam Reynolds MD    REVIEW OF SYSTEMS:  General: No fevers or chills; ENT: No sore throat; Allergy and Immunology: no persistent infections; Hematological and Lymphatic: No history of bleeding or easy bruising; Endocrine: negative; Respiratory: no cough, shortness of breath, or wheezing; " Cardiovascular: no chest pain or dyspnea on exertion; Gastrointestinal: no abdominal pain/back, change in bowel habits, or bloody stools; Genito-Urinary: no dysuria, trouble voiding, or hematuria; Musculoskeletal: negative; Neurological: no TIA or stroke symptoms; Psychiatric: no nervousness, anxiety or depression.    PHYSICAL EXAM:      Pulse: 74         General appearance:  Alert, well-appearing, and in no distress.  Oriented to person, place, and time                    Neurological: Normal speech, no focal findings noted; CN II - XII grossly intact. RLE with sensation to light touch, LLE with sensation to light touch.            Musculoskeletal: Digits/nail without cyanosis/clubbing.  Strength 5/5 BLE.                    Neck: Supple, no significant adenopathy                  Chest:  Clear to auscultation, no wheezes, rales or rhonchi, symmetric air entry. No use of accessory muscles               Cardiac: Normal rate and regular rhythm, S1 and S2 normal            Abdomen: Soft, nontender, nondistended, no masses or organomegaly, no hernia     No rebound tenderness noted; bowel sounds normal     No groin adenopathy      Extremities:   2+ R femoral pulse, 2+ L femoral pulse     doppler+ R popliteal pulse, doppler+ L popliteal pulse     doppler+ R PT pulse, doppler+ L PT pulse     doppler+ R DP pulse, doppler+ L DP pulse     1+ RLE edema, 2+ LLE edema    Skin: RLE with R second toe, medial ankle, and lower limb skin breakdown, with new R medial maleolus wound, no infection or drainage; LLE with minimal brawny edema, improved/smaller foot wound with markedly improved granulation tissue, no odor, no purulence or erythema    LAB RESULTS:  No results found for: CBC  Lab Results   Component Value Date    LABPROT 11.2 05/31/2019    INR 1.1 05/31/2019     Lab Results   Component Value Date     06/11/2020    K 4.3 06/11/2020     06/11/2020    CO2 29 06/11/2020     (H) 06/11/2020    BUN 56 (H)  06/11/2020    CREATININE 1.5 (H) 06/11/2020    CALCIUM 9.0 06/11/2020    ANIONGAP 7 (L) 06/11/2020    EGFRNONAA 49 (A) 06/11/2020     Lab Results   Component Value Date    WBC 5.13 08/22/2019    RBC 3.73 (L) 08/22/2019    HGB 9.9 (L) 08/22/2019    HCT 32.0 (L) 08/22/2019    MCV 86 08/22/2019    MCH 26.5 (L) 08/22/2019    MCHC 30.9 (L) 08/22/2019    RDW 19.1 (H) 08/22/2019     08/22/2019    MPV 8.7 (L) 08/22/2019    GRAN 3.6 08/22/2019    GRAN 69.8 08/22/2019    LYMPH 0.5 (L) 08/22/2019    LYMPH 10.1 (L) 08/22/2019    MONO 0.7 08/22/2019    MONO 13.1 08/22/2019    EOS 0.3 08/22/2019    BASO 0.05 08/22/2019    EOSINOPHIL 6.0 08/22/2019    BASOPHIL 1.0 08/22/2019    DIFFMETHOD Automated 08/22/2019     .  Lab Results   Component Value Date    HGBA1C 8.3 (H) 06/11/2020       IMAGING:  All pertinent imaging has been reviewed and interpreted independently.    BLE arterial US 1/2019: No focal stenosis    5/16/19: BLE with no HD significant stenosis, SIDRA 0.8/1.46, left ankle pressures 60 and 171    7/29/19: BLE arterial US with approx 50% R TPT stenosis, SIDRA 0.8; LLE showing no HD significant stenosis, SIDRA 0.66    11/4/19: SIDRA 0.4/0.4, DP vessel NC bilaterally, R toe waveform normal  Right lower extremity arterial ultrasound shows no hemodynamically significant stenosis.  Pressures indicate moderate arterial occlusive disease.  Left lower extremity arterial ultrasound shows no hemodynamically significant stenosis.  Pressures indicate moderate arterial occlusive disease.    5/2020:  1. Right lower extremity pressures and waveforms indicate moderate arterial occlusive disease.  2. Left lower extremity pressures and waveforms indicate no hemodynamically significant arterial occlusive disease.    IMP/PLAN:  60 y.o. male with   Patient Active Problem List   Diagnosis    Epiretinal membrane, both eyes    Nuclear sclerotic cataract of right eye    Pseudophakia, left eye    Ptosis, left eyelid    DM type 2 with diabetic  peripheral neuropathy    HLD (hyperlipidemia)    Neovascular glaucoma of both eyes    Tophaceous gout    Essential hypertension    Coronary artery disease involving native coronary artery of native heart without angina pectoris    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 edema, with long-term current use of insulin    Neovascular glaucoma of left eye, severe stage    Statin myopathy    Neovascular glaucoma, right eye, mild stage    Left leg cellulitis    PVD (peripheral vascular disease)    Right wrist pain    Multiple open wounds of lower leg    Hepatosplenomegaly    Type 2 diabetes mellitus with left diabetic foot ulcer    Open wound of left foot    Cellulitis of left lower extremity    Diabetic foot infection    Other ascites    Severe malnutrition    Goals of care, counseling/discussion    Alteration in skin integrity    MDR Acinetobacter baumannii infection    Takes dietary supplements    Intertriginous dermatitis associated with moisture    Debility    Infection due to multidrug-resistant Pseudomonas aeruginosa    Subacute osteomyelitis of left foot    Ascites    Acute on chronic combined systolic and diastolic congestive heart failure    Stage 3 chronic kidney disease    Atherosclerosis of left lower extremity with ulceration of midfoot    CKD stage 3 due to type 2 diabetes mellitus    Azotemia    Hyponatremia    Anemia due to multiple mechanisms    Persistent proteinuria    Hypokalemia    Open wound of foot, left, subsequent encounter    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    Ischemic cardiomyopathy    Status post abdominal paracentesis    Diabetic ulcer of right lower leg    being managed by PCP and specialists who is here today for evaluation of L foot wound.    -Improving L foot wound improved s/p debridement 2/13/19, multifactorial due to diabetes, PVD and venous insufficiency with  improvement in granulation tissue on Abx due to multidrug resistent bacteria in the past s/p debridements and L tibial angioplasty x 2 with improvement in wound +granulation tissue s/p debridement 6/5/19 and [place] placement; eval by Plastic Surgery re: grafting / skin grafting vs continuing wound care and allowing to heal by secondary intention   -Cont ID rec Abx regimen  -Recommend continued wound care center care - seeing Dr. Davis; may benefit from hyperbaric O2 therapy  -R ankle wound - cont wound care and obtain arterial US and SIDRA/TP; may require RLE angiogram/revascularization  -Rec BLE Xeroform and dry kerlix wraps twice daily to shin regions with elevation of LLE above level of the heart  -Low sodium diet  -Strict glycemic control  -RTC 4 weeks for continued evaluation     I spent 12 minutes evaluating this patient and greater than 50% of the time was spent counseling, coordinator care and discussing the plan of care.  All questions were answered and patient stated understanding with agreement with the above treatment plan.    Mtich Chan MD VI  Vascular and Endovascular Surgery

## 2020-07-09 NOTE — PATIENT INSTRUCTIONS
Understanding Peripheral Arterial Disease    Peripheral arteries deliver oxygen-rich blood to the tissues outside the heart. As you age, your arteries become stiffer and thicker. In addition, risk factors, such as smoking and high cholesterol, can damage the artery lining. This allows a buildup of fat and other materials (plaque) to form within the artery walls. The buildup of plaque narrows the space inside the artery and sometimes blocks blood flow. Peripheral arterial disease (PAD) happens when blood flow through the arteries is reduced because of plaque buildup. It often happens in the legs and feet, but can also happen elsewhere in the body. If this buildup happens in the a large artery in the neck (carotid artery), it can be a major contributor to stroke.  A healthy artery  An artery is a muscular tube that carries oxgen rich blood and nutrients from the heart to the rest of the body. It has a smooth lining and flexible walls that allow blood to pass freely. When active, muscles need more oxygen. This increases blood flow. Healthy arteries can adapt to meet this need.  A damaged artery    PAD begins when the lining of an artery is damaged. This is often because of risk factors, such as smoking, older age, or diabetes. Plaque then starts to form within the artery wall. At this stage, blood flows normally, so youre not likely to have symptoms.  A narrowed artery    If plaque continues to build up, the space inside the artery narrows. The artery walls become less able to expand. The artery still provides enough blood and oxygen to your muscles during rest. But when youre active, the increased demand for blood cant be met. As a result, your leg may cramp or ache when you walk.  A blocked artery    An artery can become blocked by plaque or by a blood clot lodged in a narrowed section. When this happens, oxygen cant reach the muscle below the blockage. Then you may feel pain when lying down or when you are not  active (rest pain). This type of pain is especially common at night when youre lying flat. In time, the affected tissue can die. This can lead to the loss of a toe or foot.  Date Last Reviewed: 5/1/2016 © 2000-2017 [employer]. [address] [place] [address]. All rights reserved. This information is not intended as a substitute for professional medical care. Always follow your healthcare professional's instructions.        Peripheral Artery Disease (PAD)     Peripheral artery disease (PAD) occurs when the arteries that carry blood to the limbs are narrowed or blocked. This is usually due to a buildup of a fatty substance called plaque in the walls of the arteries.  PAD most often affects the arteries in the legs. When these arteries are narrowed or blocked, blood flow to the legs is reduced. This can cause leg and foot pain and other symptoms. If severe enough, reduced blood flow to the legs can lead to tissue death (gangrene) and the loss of a toe, foot, or leg. Having PAD also makes it more likely that arteries in other body areas are blocked. For instance, arteries that carry blood to the heart or brain may be affected. This raises the chances of heart attack and stroke.  Risk factors  Certain factors can make PAD more likely. They include:  · Smoking  · Diabetes  · High blood pressure  · Unhealthy cholesterol levels  · Obesity  · Inactive lifestyle  · Older age  · Family history of PAD  Symptoms  Many people with PAD have no symptoms. If symptoms do occur, they can include:  · Pain in the muscles of the calves, thighs or hips that gets worse with activity and better with rest (intermittent claudication)  · Achy, tired, or heavy feeling in the legs  · Weakness, numbness, tingling, or loss of feeling in the legs  · Changes in skin color of the legs  · Sores on the legs and feet  · Cold leg, feet, or toes  · Pain the feet or toes even when lying down (rest pain)  Home care  PAD is a  chronic (lifelong) condition. Treatment is focused on managing your condition and lowering your health risks. This may include doing the following:  · If you smoke, quit. This helps prevent further damage to your arteries and lowers your health risks. Ask your provider about medicines or products that can help you quit smoking. Also consider joining a stop-smoking program or support group.  · Be more active. This helps you lose weight and manage problems such as high blood pressure and unhealthy cholesterol levels. Start a walking program if advised to by your provider. Your provider may also help you form a safe exercise program that is right for your needs.  · Make healthy eating changes. This includes eating less fat, salt, and sugar.  · Take medicines for high blood pressure, unhealthy cholesterol levels, and diabetes as directed.  · Have your blood pressure and cholesterol levels checked as often as directed.  · If you have diabetes, try to keep your blood sugar well controlled. Test your blood sugar as directed.  · If you are overweight, talk to your provider about forming a weight-loss plan.  · Watch for cuts, scrapes, or open sores on your feet. Poor blood flow to the feet may slow healing and increase the risk of infection from these problems.   Follow-up care  Follow up with your healthcare provider, or as advised. If imaging tests such as ultrasound were done, they will be reviewed by a doctor. You will be told the results and any new findings that may affect your care.  When to seek medical advice   Call your healthcare provider right away if any of these occur:  · Sudden severe pain in the legs or feet  · Sudden cold, pale or blue color in the legs or feet  · Weakness or numbness in the legs or feet that worsens  · Any sore or wound in the legs or feet that wont heal  · Weak pulse in your legs or feet  Know the Signs of Heart Attack and Stroke  People with PAD are at high risk for heart attack and  stroke. Knowing the signs of these problems can help you protect your health and get help when you need it. Call 911 right away if you have any of the following:  Signs of a Heart Attack  · Chest discomfort, such as pain, aching, tightness, or pressure that lasts more than a few minutes, or that comes and goes  · Pain or discomfort in the arms, back, shoulders, neck, or jaw  · Shortness of breath  · Sweating (often a cold, clammy sweat)  · Nausea  · Lightheadedness  Signs of a Stroke  · Sudden numbness or weakness of the face, arms, or legs, especially on one side  · Sudden confusion or trouble speaking or understanding  · Sudden trouble seeing in one or both eyes  · Sudden trouble walking, dizziness, or loss of balance  · Sudden, severe headache with no known cause   Date Last Reviewed: 9/21/2015  © 1233-7913 [employer]. [address] [place] [address]. All rights reserved. This information is not intended as a substitute for professional medical care. Always follow your healthcare professional's instructions.        A Walking Program for Peripheral Arterial Disease (PAD)  Peripheral arterial disease (PAD) is a condition where the arteries in the legs are severely damaged. When you have PAD, walking can be painful. So you may start to walk less. Walking less makes your leg muscles weaker. It then becomes harder and more painful to walk. A walking program can break this cycle. This sheet gives you tips on how to get started.     Walking farther without pain  Exercise strengthens leg muscles that are out of shape. It also trains these muscles to work with less oxygen. This helps your leg muscles work better even with reduced blood flow to your legs. When you have PAD, a walking program can be helpful. Your program can:  · Let you walk longer and farther without claudication. This is an ache in your legs during exercise that goes away with rest.  · Let you do more and be more active  · Add to  your overall health and well-being  · Help you control your blood sugar and blood pressure  · Help you become healthier with no risk and at little or no cost  Getting started  Your local hospital, vascular center, or cardiac rehab center may have a special walking program for people with PAD. If so, this is your best option. But if you cant find a program, or its not covered by insurance, you can still walk on your own. Follow these steps at each session:  · Step 1. Start at a pace that lets you walk for 5 to 10 minutes before you start to feel claudication. This feeling is unpleasant, but it doesnt hurt you. Keep going until the pain makes you feel that you need to stop.  · Step 2. Stop and rest for 3 to 5 minutes, just long enough for the pain to go away. You can rest standing or sitting. (Some people like to bring along a cane or a lightweight folding chair.)  · Step 3. Again walk at a pace that lets you walk for 5 to 10 minutes more before you feel pain. This may be slower than your starting pace in step 1. Then rest again.  · Step 4. Repeat this process until youve walked for 45 minutes. This should be about 60 to 80 minutes total, including resting time. You may not be able to do a full 45 minutes at first. Do as much as you can, and increase your time as you improve.  Making the most of your program  · Walk at least 3 times a week. Take no more than 2 days off between sessions. If you stop walking, even for a week or two, you can lose the health benefits of your program.  · Find a good place to walk. A treadmill or a track may be better at first. That way, you wont run the risk of going too far and finding that you cant walk back. Be sure to have a place to walk indoors in bad weather, such as a gym or a mall.  · Wear shoes with sturdy, flexible soles. The heel should fit without slipping. You should have room to wiggle your toes.  · Keep track of how long you walk. A pedometer will show your daily  progress. It can also show how much farther you can walk as time goes by.  · Ask a friend to keep you company. You may enjoy walking with someone else. Or you may want to make your walking sessions a time to relax by yourself.  · Make it fun. Listen to music while you walk and rest. Walk in a favorite park. Get to know the people at the gym, or the folks that you pass on your route. While you rest, you can window-shop, read, or feed the birds. Do whatever will help you enjoy your exercise sessions.  Date Last Reviewed: 6/1/2016  © 7312-7308 [employer]. [address] [place] [address], [place] [address]. All rights reserved. This information is not intended as a substitute for professional medical care. Always follow your healthcare professional's instructions.          Wound Care  Taking proper care of your wound will help it heal. Your healthcare provider may show you how to clean and dress the wound. He or she will also explain how to tell if the wound is healing normally. If you are unsure of how to take care of the wound, be sure to clarify what dressing to use and how often you should change the bandages. Here are the basic steps.     A wound that's not healing normally may be dark in color or have white streaks.   Wash your hands  Tips for washing your hands include:  · Use liquid soap and lather for 2 minutes. Scrub between your fingers and under your nails.  · Rinse with warm water, keeping your fingers pointing down.  · Use a paper towel to dry your hands and to turn off the faucet.  Remove the used dressing  Here are suggestions for removing the dressing:  · If dressing changes cause you pain, be sure to take your pain medicine as prescribed by your healthcare provider 30 minutes before dressing changes.  · Set up your supplies.  · Put on disposable gloves if youre dressing a wound for someone else or your wound is infected.  · Loosen the tape by pulling gently toward the wound.  · Gently take off  the old dressing. If the dressing is stuck to the wound, moisten it with saline (if available) or clean water.  · If you have a drain or tube in the wound, be careful not to pull on it.  · Remove the dressing 1 layer at a time and put it in a plastic bag.  · Remove your gloves.  Inspect and dress the wound  Check the wound carefully:  · Each time you change the dressing, inspect the wound carefully to be sure its healing normally by making sure your wound appears to be pink and moist, and is free of infection.    · Wash your hands again. Put on a new pair of gloves.  · Clean and dress the wound as directed by your healthcare provider or nurse. Do not put anything in the wound that is not prescribed or directed by your healthcare provider. If you have a drain or tube, be careful not to pull on it. Make sure to secure the drain or tube as well.  · Put all supplies in a plastic bag. Seal the bag and put it in the trash.  · Be sure to wash your hands again.  Call your healthcare provider  Call your healthcare provider if you see any of the following signs of a problem:  · Bleeding that soaks the dressing  · Pink fluid weeping from the wound  · Increased drainage or drainage that is yellow, yellow-green, or foul-smelling  · Increased swelling or pain, or redness or swelling in the skin around the wound  · A change in the color of the wound, or if streaks develop in a direction away from the wound  · The area between any stitches opens up  · An increase in the size of the wound  · A fever of 100.4°F (38°C) or higher, or as directed by your healthcare provider  · Chills, increased fatigue, or a loss of appetite      Date Last Reviewed: 7/30/2015  © 8454-9935 [employer]. [address] [place] [address], [place] [address]. All rights reserved. This information is not intended as a substitute for professional medical care. Always follow your healthcare professional's instructions.

## 2020-07-10 ENCOUNTER — HOSPITAL ENCOUNTER (OUTPATIENT)
Facility: HOSPITAL | Age: 60
Discharge: HOME | End: 2020-07-10
Attending: RADIOLOGY | Admitting: RADIOLOGY
Payer: MEDICAID

## 2020-07-10 VITALS — SYSTOLIC BLOOD PRESSURE: 149 MMHG | DIASTOLIC BLOOD PRESSURE: 77 MMHG

## 2020-07-10 DIAGNOSIS — R18.8 OTHER ASCITES: ICD-10-CM

## 2020-07-10 NOTE — PROGRESS NOTES
Paracentesis completed. 3,750ml removed from lateral RLQ. Tolerated well. Site with bandaid, CDI, no redness, swelling or hematoma noted. VSS. NADN. Stable for discharge home. Verbalized understanding of discharge instructions. Family present at time of discharge to drive patient home. Will call for next appointment when ready.

## 2020-07-10 NOTE — DISCHARGE SUMMARY
Radiology Discharge Summary      Hospital Course: No complications    Admit Date: 7/10/2020  Discharge Date: 07/10/2020     Instructions Given to Patient: Yes  Diet: Resume prior diet  Activity: activity as tolerated and no driving for today    Description of Condition on Discharge: Stable  Vital Signs (Most Recent): BP: (!) 149/77 (07/10/20 1420)    Discharge Disposition: Home    Discharge Diagnosis: Ascites s/p paracentesis. Follow up as scheduled.    Nicholas Vaughan M.D.  Diagnostic and Interventional Radiologist  Department of Radiology  Pager: [phone]

## 2020-07-10 NOTE — H&P
Radiology History & Physical      SUBJECTIVE:     Chief Complaint: Ascites    History of Present Illness:  Marvin Ray is a 60 y.o. male who presents for paracentesis  Past Medical History:   Diagnosis Date    Arthritis     Cellulitis     CKD (chronic kidney disease), stage III     Coronary artery disease     Diabetes mellitus     Diabetic retinopathy     Diabetic ulcer of left foot     Glaucoma     Gout     Hyperlipemia     Hypertension     ICD (implantable cardioverter-defibrillator) in place 11/02/2018    Left chest    Non-pressure chronic ulcer of other part of left foot with fat layer exposed 10/23/2018    PVD (peripheral vascular disease)     Type 2 diabetes mellitus with left diabetic foot ulcer 10/29/2018    Unsteady gait     uses a wheelchair     Past Surgical History:   Procedure Laterality Date    AHMED GLAUCOMA IMPLANT Left 2011    DONE AT [facility]    AORTOGRAPHY WITH SERIALOGRAPHY Left 4/30/2019    Procedure: AORTOGRAM, WITH SERIALOGRAPHY, LEFT LOWER EXTREMITY, POSSIBLE INTERVENTION;  Surgeon: Mitch Chan MD;  Location: [facility] OR;  Service: Vascular;  Laterality: Left;  RN PRE OP 4-23-19.  NO H & P, No [place]ent  [place]    BAERVELDT GLAUCOMA IMPLANT Left 2012    WITH CATARACT EXTRACTION//DONE AT [facility]    CARDIAC CATHETERIZATION Left 05/2016    CARDIAC DEFIBRILLATOR PLACEMENT Left 11/02/2018    CATARACT EXTRACTION W/  INTRAOCULAR LENS IMPLANT Left 2012    WITH BAERVEDT//DONE AT [facility]    CATARACT EXTRACTION W/  INTRAOCULAR LENS IMPLANT Right 09/26/2018    COMPLEX ()    CB DESTRUCTION WITH CYCLO G6 Left 02/15/2017        CYST REMOVAL      DEBRIDEMENT OF FOOT  12/28/2018    Procedure: DEBRIDEMENT, FOOT;  Surgeon: Mitch Chan MD;  Location: [facility] OR;  Service: Vascular;;    DEBRIDEMENT OF FOOT  6/5/2019    Procedure: DEBRIDEMENT, FOOT;  Surgeon: Mitch Chan MD;  Location: [facility] OR;  Service: Vascular;;    EXAMINATION UNDER  ANESTHESIA Left 12/5/2018    Procedure: Exam under anesthesia, left foot debridement, washout and all other indicated procedures;  Surgeon: Mitch Wall MD;  Location: [facility] OR;  Service: Vascular;  Laterality: Left;  1030AM START  RN PREOP 12/3/2018-----AICD  SEEN BY DR JAMES 12/4    EXAMINATION UNDER ANESTHESIA Left 2/13/2019    Procedure: LEFT FOOT WOUND DEBRIDEMENT, WASHOUT AND ALL OTHER INDICATED PROCEDURES;  Surgeon: Mitch Wall MD;  Location: [facility] OR;  Service: Vascular;  Laterality: Left;  requesting 1st case start  THIS CASE 1ST PER DR WALL ON 2/1/19 @ 844AM -[place]  RN PREOP 2/6/2019  HAS CARDIAC CLEARANCE    EXAMINATION UNDER ANESTHESIA Left 12/28/2018    Procedure: Exam under anesthesia, left foot debridement, left foot washout, possible left second through fifth metatarsal resection;  Surgeon: Mitch Wall MD;  Location: [facility] OR;  Service: Vascular;  Laterality: Left;  1:00pm start per julissa @ 8:58am  RN  PHONE PRE OP 12-27-18--=Pt coming [facility]  NEED CONSENT    EXAMINATION UNDER ANESTHESIA Left 6/5/2019    Procedure: LEFT FOOT DEBRIDEMENT, WASHOUT AND ALL OTHER INDICATED PROCEDURES;  Surgeon: Mitch Wall MD;  Location: [facility] OR;  Service: Vascular;  Laterality: Left;  RN PRE OP 5-31-19------ICD------NEED CONSENT    INCISION AND DRAINAGE Left 11/14/2018    Procedure: Incision and Drainage, left lower extremity debridement, washout;  Surgeon: Mitch Wall MD;  Location: [facility] OR;  Service: Vascular;  Laterality: Left;    INCISION AND DRAINAGE Left 11/16/2018    Procedure: INCISION AND DRAINAGE;  Surgeon: Mitch Wall MD;  Location: [facility] OR;  Service: Vascular;  Laterality: Left;    INTRAOCULAR PROSTHESES INSERTION Right 9/26/2018    Procedure: INSERTION, IOL PROSTHESIS;  Surgeon: Perla Cortés MD;  Location: [facility] OR [address] [place] [address];  Service: Ophthalmology;  Laterality: Right;    PERITONEOCENTESIS N/A 3/1/2019    Procedure:  PARACENTESIS, ABDOMINAL, INITIAL;  Surgeon: Dosc Diagnostic Provider;  [facility];  Service: Radiology;  Laterality: N/A;    PERITONEOCENTESIS N/A 3/25/2019    Procedure: PARACENTESIS, ABDOMINAL, INITIAL;  Surgeon: Dosc Diagnostic Provider;  Location: [facility] OR;  Service: Radiology;  Laterality: N/A;    PERITONEOCENTESIS N/A 7/22/2019    Procedure: PARACENTESIS, ABDOMINAL, INITIAL;  Surgeon: Dosc Diagnostic Provider;  Location: [facility];  Service: Radiology;  Laterality: N/A;    PERITONEOCENTESIS N/A 8/16/2019    Procedure: PARACENTESIS, ABDOMINAL, INITIAL;  Surgeon: Dosc Diagnostic Provider;  Location: [facility] OR;  Service: Radiology;  Laterality: N/A;    PERITONEOCENTESIS N/A 9/26/2019    Procedure: PARACENTESIS, ABDOMINAL;  Surgeon: Dosc Diagnostic Provider;  [facility];  Service: Radiology;  Laterality: N/A;    PERITONEOCENTESIS N/A 10/11/2019    Procedure: PARACENTESIS, ABDOMINAL;  Surgeon: Dosc Diagnostic Provider;  [facility];  Service: Radiology;  Laterality: N/A;    PERITONEOCENTESIS N/A 10/31/2019    Procedure: PARACENTESIS, ABDOMINAL;  Surgeon: Dosc Diagnostic Provider;  [facility];  Service: Radiology;  Laterality: N/A;    PERITONEOCENTESIS N/A 11/18/2019    Procedure: PARACENTESIS, ABDOMINAL;  Surgeon: Dosc Diagnostic Provider;  [facility];  Service: Radiology;  Laterality: N/A;    PERITONEOCENTESIS N/A 12/16/2019    Procedure: PARACENTESIS, ABDOMINAL;  Surgeon: Dosc Diagnostic Provider;  [facility];  Service: Radiology;  Laterality: N/A;  2PM START    PERITONEOCENTESIS N/A 2/7/2020    Procedure: PARACENTESIS, ABDOMINAL;  Surgeon: Dosc Diagnostic Provider;  Location: [facility] OR;  Service: Radiology;  Laterality: N/A;  REQUESTED 10:30A START    PERITONEOCENTESIS N/A 2/19/2020    Procedure: PARACENTESIS, ABDOMINAL;  Surgeon: Dosc Diagnostic Provider;  Location: [facility] OR;  Service: Radiology;  Laterality: N/A;    PERITONEOCENTESIS N/A 2/28/2020    Procedure:  PARACENTESIS, ABDOMINAL;  Surgeon: Dosc Diagnostic Provider;  Location: [facility] OR;  Service: Radiology;  Laterality: N/A;  REQUESTED 10AM START    PHACOEMULSIFICATION OF CATARACT Right 9/26/2018    Procedure: PHACOEMULSIFICATION, CATARACT;  Surgeon: Perla Cortés MD;  [facility];  Service: Ophthalmology;  Laterality: Right;    REPEAT ABDOMINAL PARACENTESIS N/A 2/11/2019    Procedure: PARACENTESIS, ABDOMINAL, REPEAT;  Surgeon: Dosc Diagnostic Provider;  Location: [facility] OR;  Service: Radiology;  Laterality: N/A;  1PM START    REPEAT ABDOMINAL PARACENTESIS N/A 9/12/2019    Procedure: PARACENTESIS, ABDOMINAL, REPEAT;  Surgeon: Dosc Diagnostic Provider;  Location: [facility] OR;  Service: Radiology;  Laterality: N/A;  9AM START    REPEAT ABDOMINAL PARACENTESIS N/A 12/2/2019    Procedure: PARACENTESIS, ABDOMINAL, REPEAT;  Surgeon: Dosc Diagnostic Provider;  Location: [facility] OR;  Service: Radiology;  Laterality: N/A;  3PM START    REPEAT ABDOMINAL PARACENTESIS N/A 1/10/2020    Procedure: PARACENTESIS, ABDOMINAL, REPEAT;  Surgeon: Dosc Diagnostic Provider;  Location: [facility] OR;  Service: Radiology;  Laterality: N/A;  1130AM START    REPEAT ABDOMINAL PARACENTESIS N/A 1/20/2020    Procedure: PARACENTESIS, ABDOMINAL, REPEAT;  Surgeon: Dos Diagnostic Provider;  Location: [facility] OR;  Service: Radiology;  Laterality: N/A;  1PM START    REPEAT ABDOMINAL PARACENTESIS N/A 1/31/2020    Procedure: PARACENTESIS, ABDOMINAL, REPEAT;  Surgeon: Dosc Diagnostic Provider;  Location: [facility] OR;  Service: Radiology;  Laterality: N/A;  10AM START    REPEAT ABDOMINAL PARACENTESIS N/A 3/16/2020    Procedure: PARACENTESIS, ABDOMINAL, REPEAT;  Surgeon: Dosc Diagnostic Provider;  Location: [facility] OR;  Service: Radiology;  Laterality: N/A;  10AM START    REPEAT ABDOMINAL PARACENTESIS N/A 3/30/2020    Procedure: PARACENTESIS, ABDOMINAL, REPEAT;  Surgeon: Dosc Diagnostic Provider;  Location: [facility] OR;  Service: Radiology;  Laterality: N/A;     REPEAT ABDOMINAL PARACENTESIS N/A 4/9/2020    Procedure: PARACENTESIS, ABDOMINAL, REPEAT;  Surgeon: [facility] Diagnostic Provider;  Location: [facility] OR;  Service: Radiology;  Laterality: N/A;  1130AM START    REPEAT ABDOMINAL PARACENTESIS N/A 5/8/2020    Procedure: PARACENTESIS, ABDOMINAL, REPEAT;  Surgeon: [facility] Diagnostic Provider;  Location: [facility] OR;  Service: Radiology;  Laterality: N/A;  9AM START    REPEAT ABDOMINAL PARACENTESIS N/A 5/21/2020    Procedure: PARACENTESIS, ABDOMINAL, REPEAT;  Surgeon: [facility] Diagnostic Provider;  Location: [facility] OR;  Service: Radiology;  Laterality: N/A;  10AM START    REPEAT ABDOMINAL PARACENTESIS N/A 6/5/2020    Procedure: PARACENTESIS, ABDOMINAL, REPEAT;  Surgeon: [facility] Diagnostic Provider;  Location: [facility] OR;  Service: Radiology;  Laterality: N/A;  NOON START    REVISION OF PROCEDURE INVOLVING GLAUCOMA DRAINAGE DEVICE Left 10/2/2019    EXTRUDING BAERVELDT /WITH PERICARDIAL PATCH GRAFT ()    Right foot surgery  10/2014    TOE AMPUTATION Right     first and second    TOE AMPUTATION Left 11/16/2018    Procedure: AMPUTATION, TOES  2-5;  Surgeon: Mitch Chan MD;  Location: [facility] OR;  Service: Vascular;  Laterality: Left;    TOE AMPUTATION Left 12/5/2018    Procedure: AMPUTATION, TOE;  Surgeon: Mitch Chan MD;  Location: [facility] OR;  Service: Vascular;  Laterality: Left;  left great toe    TONSILLECTOMY         Home Meds:   Prior to Admission medications    Medication Sig Start Date End Date Taking? Authorizing Provider   allopurinoL (ZYLOPRIM) 300 MG tablet TAKE 1 TABLET(300 MG) BY MOUTH EVERY DAY 6/15/20   Rubén Davis MD   aspirin (ECOTRIN) 81 MG EC tablet Take 81 mg by mouth once daily.    Historical Provider, MD   bisacodyl (DULCOLAX) 5 mg EC tablet Take 5 mg by mouth daily as needed for Constipation.    Historical Provider, MD   brimonidine 0.2% (ALPHAGAN) 0.2 % Drop Place 1 drop into both eyes 3 (three) times daily. 12/12/19   Perla  "MD Milana   carvediloL (COREG) 3.125 MG tablet TAKE 1 TABLET(3.125 MG) BY MOUTH TWICE DAILY 6/21/20   Rubén Davis MD   coenzyme Q10 100 mg capsule Take 100 mg by mouth every morning.    Historical Provider, MD   dorzolamide-timolol 2-0.5% (COSOPT) 22.3-6.8 mg/mL ophthalmic solution Place 1 drop into both eyes 3 (three) times daily. 12/12/19   Perla Cortés MD   ezetimibe (ZETIA) 10 mg tablet TAKE 1 TABLET(10 MG) BY MOUTH EVERY DAY 4/16/20   Rubén Davis MD   fish oil-omega-3 fatty acids 300-1,000 mg capsule Take 1 capsule by mouth 2 (two) times daily. 1/23/19   Sara Ching MD   gabapentin (NEURONTIN) 100 MG capsule TAKE 2 CAPSULES(200 MG) BY MOUTH EVERY EVENING 5/31/20   Rubén Davis MD   insulin degludec-liraglutide (XULTOPHY 100/3.6) 100 unit-3.6 mg /mL (3 mL) InPn Inject 34 Units into the skin once daily. 1/28/20   Sheryl Madison NP   MULTIVIT,THER IRON,CA,FA & MIN (MULTIVITAMIN) Tab Take 1 tablet by mouth every morning.     Historical Provider, MD   pen needle, diabetic 31 gauge x 1/4" Ndle Use as directed  Patient not taking: Reported on 5/21/2020 8/11/16   Mike Bass MD   torsemide (DEMADEX) 20 MG Tab Take 4 tablets (80 mg total) by mouth 2 (two) times daily. 6/16/20 7/16/20  Rubén Davis MD     Anticoagulants/Antiplatelets: no anticoagulation    Allergies:   Review of patient's allergies indicates:   Allergen Reactions    Statins-hmg-coa reductase inhibitors      Generalized Pain    Onglyza [saxagliptin]     Penicillins Rash     Sedation History:  no adverse reactions    Review of Systems:   Hematological: no known coagulopathies  Respiratory: no shortness of breath  Cardiovascular: no chest pain  Gastrointestinal: no abdominal pain  Genito-Urinary: no dysuria  Musculoskeletal: negative  Neurological: no TIA or stroke symptoms         OBJECTIVE:     Vital Signs (Most Recent)  BP: (!) 149/77 (07/10/20 1420)    Physical Exam:  ASA: 3  Mallampati: 2    General: no acute " distress  Mental Status: alert and oriented to person, place and time  HEENT: normocephalic, atraumatic  Chest: unlabored breathing  Heart: regular heart rate  Abdomen: distended  Extremity: moves all extremities    Laboratory  Lab Results   Component Value Date    INR 1.1 05/31/2019       Lab Results   Component Value Date    WBC 5.13 08/22/2019    HGB 9.9 (L) 08/22/2019    HCT 32.0 (L) 08/22/2019    MCV 86 08/22/2019     08/22/2019      Lab Results   Component Value Date     (H) 06/11/2020     06/11/2020    K 4.3 06/11/2020     06/11/2020    CO2 29 06/11/2020    BUN 56 (H) 06/11/2020    CREATININE 1.5 (H) 06/11/2020    CALCIUM 9.0 06/11/2020    MG 2.1 05/31/2019    ALT 12 08/22/2019    AST 14 08/22/2019    ALBUMIN 2.8 (L) 08/22/2019    BILITOT 0.5 08/22/2019       ASSESSMENT/PLAN:     Sedation Plan: Local only  Patient will undergo paracentesis.    Nicholas Vaughan M.D.  Diagnostic and Interventional Radiologist  Department of Radiology  Pager: [phone]

## 2020-07-10 NOTE — PROCEDURES
Radiology Post-Procedure Note    Pre Op Diagnosis: Ascites    Post Op Diagnosis: Same    Procedure: US guided paracentesis.    Procedure performed by: Nicholas Vaughan MD    Written Informed Consent Obtained: Yes  Specimen Removed: YES clear fluid  Estimated Blood Loss: Minimal    Findings:   Successful RLQ paracentesis.  Albumin administered per protocol. No complications.    Patient tolerated procedure well.    Nicholas Vaughan M.D.  Diagnostic and Interventional Radiologist  Department of Radiology  Pager: [phone]

## 2020-07-10 NOTE — PROGRESS NOTES
Time out completed. MD assessing RLQ per ultrasound. Lidocaine administered per MD. Catheter advanced into peritoneal fluid. Tolerated well. Fluid draining without difficulty into vacutainer.

## 2020-07-14 ENCOUNTER — HOSPITAL ENCOUNTER (OUTPATIENT)
Dept: WOUND CARE | Facility: HOSPITAL | Age: 60
Discharge: HOME | End: 2020-07-14
Attending: FAMILY MEDICINE
Payer: MEDICAID

## 2020-07-14 VITALS
RESPIRATION RATE: 20 BRPM | HEART RATE: 73 BPM | SYSTOLIC BLOOD PRESSURE: 109 MMHG | TEMPERATURE: 99 F | DIASTOLIC BLOOD PRESSURE: 56 MMHG

## 2020-07-14 DIAGNOSIS — S91.302A OPEN WOUND OF LEFT FOOT: ICD-10-CM

## 2020-07-14 DIAGNOSIS — L97.919 DIABETIC ULCER OF RIGHT LOWER LEG: ICD-10-CM

## 2020-07-14 DIAGNOSIS — E11.621 TYPE 2 DIABETES MELLITUS WITH LEFT DIABETIC FOOT ULCER: Primary | ICD-10-CM

## 2020-07-14 DIAGNOSIS — E11.622 DIABETIC ULCER OF RIGHT LOWER LEG: ICD-10-CM

## 2020-07-14 DIAGNOSIS — L97.529 TYPE 2 DIABETES MELLITUS WITH LEFT DIABETIC FOOT ULCER: Primary | ICD-10-CM

## 2020-07-14 PROCEDURE — 11042 DBRDMT SUBQ TIS 1ST 20SQCM/<: CPT | Performed by: FAMILY MEDICINE

## 2020-07-14 PROCEDURE — 99214 OFFICE O/P EST MOD 30 MIN: CPT | Mod: 25,,, | Performed by: FAMILY MEDICINE

## 2020-07-14 PROCEDURE — 11042 DEBRIDEMENT: ICD-10-PCS | Mod: ,,, | Performed by: FAMILY MEDICINE

## 2020-07-14 PROCEDURE — 27201912 HC WOUND CARE DEBRIDEMENT SUPPLIES: Performed by: FAMILY MEDICINE

## 2020-07-14 PROCEDURE — 11042 DBRDMT SUBQ TIS 1ST 20SQCM/<: CPT | Mod: ,,, | Performed by: FAMILY MEDICINE

## 2020-07-14 PROCEDURE — 99214 PR OFFICE/OUTPT VISIT, EST, LEVL IV, 30-39 MIN: ICD-10-PCS | Mod: 25,,, | Performed by: FAMILY MEDICINE

## 2020-07-14 NOTE — PROGRESS NOTES
"[facility]                Progress Note    Subjective:       Patient ID: Marvin Ray is a 60 y.o. male.    Chief Complaint: Wound Check    F/u wound care visit. Patient in w/c, easily transfers to exam chair. Denies pain at present. There has been no increased drainage.  Slough has been builing back up in right foot wounds per patients sister.  No odor from wounds.        Review of Systems   Constitutional: Negative.    HENT: Negative.    Eyes: Negative.    Respiratory: Negative.    Cardiovascular: Negative.    Gastrointestinal: Negative.    Skin: Positive for wound.   Neurological: Negative.    Psychiatric/Behavioral: Negative.          Objective:        Physical Exam  Constitutional:       Appearance: Normal appearance.   HENT:      Head: Normocephalic and atraumatic.      Nose: Nose normal.      Mouth/Throat:      Mouth: Mucous membranes are moist.      Pharynx: Oropharynx is clear.   Eyes:      Pupils: Pupils are equal, round, and reactive to light.   Neck:      Musculoskeletal: Normal range of motion and neck supple.   Pulmonary:      Effort: Pulmonary effort is normal.      Breath sounds: Normal breath sounds.   Abdominal:      General: There is no distension.      Palpations: Abdomen is soft.      Tenderness: There is no abdominal tenderness.   Skin:     General: Skin is warm and dry.      Comments: +diabetic foot ulcers to bilateral feet; excess slough in right foot wounds   Neurological:      General: No focal deficit present.      Mental Status: He is alert and oriented to person, place, and time.           Vitals:    07/14/20 1022   BP: (!) 109/56   Pulse: 73   Resp: 20   Temp: 98.5 °F (36.9 °C)     Debridement    Date/Time: 7/14/2020 1:04 PM  Performed by: Rubén Davis MD  Authorized by: Rubén Davis MD     Time out: Immediately prior to procedure a "time out" was called to verify the correct patient, procedure, equipment, support staff and " site/side marked as required.    Consent Done?:  Yes (Written)  Local anesthesia used?: Yes    Local anesthetic:  Topical anesthetic  Anesthetic total (ml):  10    Wound Details:    Location:  Right foot    Location:  Right Dorsal Foot       Length (cm):  1.7       Area (sq cm):  4.25       Width (cm):  2.5       Percent Debrided (%):  100       Depth (cm):  0.1       Total Area Debrided (sq cm):  4.25    Depth of debridement:  Subcutaneous tissue    Tissue debrided:  Dermis, Epidermis and Subcutaneous    Devitalized tissue debrided:  Biofilm, Slough and Fibrin    Instruments:  Curette    Bleeding:  Minimal  Hemostasis Achieved: Yes    Method Used:  Pressure  Patient tolerance:  Patient tolerated the procedure well with no immediate complications      Assessment:           ICD-10-CM ICD-9-CM   1. Type 2 diabetes mellitus with left diabetic foot ulcer  E11.621 250.80    L97.529 707.15   2. Open wound of left foot  S91.302A 892.0   3. Diabetic ulcer of right lower leg  E11.622 250.80    L97.919 707.10            Wound 11/02/18 Diabetic Ulcer plantar Foot (Active)   11/02/18     Pre-existing: Yes   Primary Wound Type: Diabetic ulc   Side: Left   Orientation: plantar   Location: Foot   Wound Number (optional):    Ankle-Brachial Index:    Pulses:    Removal Indication and Assessment:    Wound Outcome:    (Retired) Wound Type:    (Retired) Wound Length (cm):    (Retired) Wound Width (cm):    (Retired) Depth (cm):    Wound Description (Comments):    Removal Indications:    Wound Image   07/14/20 1000   Wound WDL ex 07/14/20 1000   Dressing Appearance Intact;Moist drainage 07/14/20 1000   Drainage Amount Small 07/14/20 1000   Drainage Characteristics/Odor Serosanguineous 07/14/20 1000   Appearance Pink;Red;Slough 07/14/20 1000   Tissue loss description Full thickness 07/14/20 1000   Black (%), Wound Tissue Color 0 % 07/14/20 1000   Red (%), Wound Tissue Color 60 % 07/14/20 1000   Yellow (%), Wound Tissue Color 40 % 07/14/20  1000   Periwound Area Dry 07/14/20 1000   Wound Edges Open 07/14/20 1000   Wound Length (cm) 5.5 cm 07/14/20 1000   Wound Width (cm) 5.5 cm 07/14/20 1000   Wound Depth (cm) 0.2 cm 07/14/20 1000   Wound Volume (cm^3) 6.05 cm^3 07/14/20 1000   Wound Surface Area (cm^2) 30.25 cm^2 07/14/20 1000   Care Cleansed with:;Soap and water;Sterile normal saline 07/14/20 1000   Dressing Changed;Collagen;Foam;Cast padding 07/14/20 1000   Periwound Care Skin barrier film applied 07/14/20 1000            Wound 05/08/20 1015 Left Foot (Active)   05/08/20 1015    Pre-existing: Yes   Primary Wound Type:    Side: Left   Orientation:    Location: Foot   Wound Number (optional):    Ankle-Brachial Index:    Pulses:    Removal Indication and Assessment:    Wound Outcome:    (Retired) Wound Type:    (Retired) Wound Length (cm):    (Retired) Wound Width (cm):    (Retired) Depth (cm):    Wound Description (Comments):    Removal Indications:    Wound Image   07/14/20 1000   Wound WDL ex 07/14/20 1000   Dressing Appearance Intact;Moist drainage 07/14/20 1000   Drainage Amount Small 07/14/20 1000   Drainage Characteristics/Odor Serosanguineous 07/14/20 1000   Appearance Red 07/14/20 1000   Tissue loss description Full thickness 07/14/20 1000   Black (%), Wound Tissue Color 0 % 07/14/20 1000   Red (%), Wound Tissue Color 100 % 07/14/20 1000   Yellow (%), Wound Tissue Color 0 % 07/14/20 1000   Periwound Area Dry 07/14/20 1000   Wound Edges Open 07/14/20 1000   Wound Length (cm) 3 cm 07/14/20 1000   Wound Width (cm) 7.8 cm 07/14/20 1000   Wound Depth (cm) 0.2 cm 07/14/20 1000   Wound Volume (cm^3) 4.68 cm^3 07/14/20 1000   Wound Surface Area (cm^2) 23.4 cm^2 07/14/20 1000   Care Cleansed with:;Soap and water;Sterile normal saline 07/14/20 1000   Dressing Changed;Collagen;Foam;Cast padding 07/14/20 1000   Periwound Care Skin barrier film applied 07/14/20 1000   Dressing Change Due 07/17/20 07/14/20 1000            Wound 06/16/20 1037 Venous Ulcer  Right medial Leg (Active)   06/16/20 1037    Pre-existing: No   Primary Wound Type: Venous ulcer   Side: Right   Orientation: medial   Location: Leg   Wound Number (optional):    Ankle-Brachial Index:    Pulses:    Removal Indication and Assessment:    Wound Outcome:    (Retired) Wound Type:    (Retired) Wound Length (cm):    (Retired) Wound Width (cm):    (Retired) Depth (cm):    Wound Description (Comments):    Removal Indications:    Wound Image   07/14/20 1000   Wound WDL ex 07/14/20 1000   Dressing Appearance Clean;Moist drainage 07/14/20 1000   Drainage Amount Small 07/14/20 1000   Drainage Characteristics/Odor Serosanguineous 07/14/20 1000   Appearance Red;Yellow;Slough 07/14/20 1000   Tissue loss description Full thickness 07/14/20 1000   Black (%), Wound Tissue Color 0 % 07/14/20 1000   Red (%), Wound Tissue Color 5 % 07/14/20 1000   Yellow (%), Wound Tissue Color 95 % 07/14/20 1000   Periwound Area Dry;Redness 07/14/20 1000   Wound Edges Open 07/14/20 1000   Wound Length (cm) 1.7 cm 07/14/20 1000   Wound Width (cm) 2.5 cm 07/14/20 1000   Wound Depth (cm) 0.1 cm 07/14/20 1000   Wound Volume (cm^3) 0.42 cm^3 07/14/20 1000   Wound Surface Area (cm^2) 4.25 cm^2 07/14/20 1000   Care Cleansed with:;Soap and water;Sterile normal saline 07/14/20 1000   Dressing Changed;Cast padding 07/14/20 1000   Periwound Care Skin barrier film applied 07/14/20 1000   Dressing Change Due 07/17/20 07/14/20 1000            Incision/Site 06/16/20 0930 Right Toe, third anterior (Active)   06/16/20 0930   Present Prior to Hospital Arrival?: Yes   Side: Right   Location: Toe, third   Orientation: anterior   Incision Type:    Closure Method:    Additional Comments:    Removal Indication and Assessment:    Wound Outcome:    Removal Indications:    Wound Image   07/14/20 1000   Incision WDL ex 07/14/20 1000   Dressing Appearance Intact;Moist drainage 07/14/20 1000   Drainage Amount Small 07/14/20 1000   Drainage Characteristics/Odor  Serosanguineous 07/14/20 1000   Appearance Red;Yellow;Slough 07/14/20 1000   Black (%), Wound Tissue Color 0 % 07/14/20 1000   Red (%), Wound Tissue Color 5 % 07/14/20 1000   Yellow (%), Wound Tissue Color 95 % 07/14/20 1000   Periwound Area Dry;Redness 07/14/20 1000   Wound Edges Open 07/14/20 1000   Wound Length (cm) 0.5 cm 07/14/20 1000   Wound Width (cm) 0.6 cm 07/14/20 1000   Wound Depth (cm) 0.1 cm 07/14/20 1000   Wound Volume (cm^3) 0.03 cm^3 07/14/20 1000   Wound Surface Area (cm^2) 0.3 cm^2 07/14/20 1000   Care Cleansed with:;Soap and water;Sterile normal saline 07/14/20 1000   Dressing Applied;Foam;Cast padding 07/14/20 1000   Periwound Care Skin barrier film applied 07/14/20 1000   Dressing Change Due 07/17/20 07/14/20 1000           Plan:                Orders Placed This Encounter   Procedures    Debridement     This order was created via procedure documentation     Standing Status:   Standing     Number of Occurrences:   1    Change dressing     Right foot/toe wound: Clean with saline and cover with Iodosorb, mextra, and tab foam long secured with cast padding x 2 and stockinet. Change every 3 days.    Left foot wound: Clean wounds with saline. Cavilon periwound. Adia to wound bed with aquacel ag foam secured with cast padding x 3 with stockinet. Tab foam long to ankle prior to securing with cast padding to protect shearing injury. Change every 3 days.        Follow up in about 2 weeks (around 7/28/2020) for wound care.       Rubén Davis MD

## 2020-07-28 ENCOUNTER — HOSPITAL ENCOUNTER (OUTPATIENT)
Dept: WOUND CARE | Facility: HOSPITAL | Age: 60
Discharge: HOME | End: 2020-07-28
Attending: FAMILY MEDICINE
Payer: MEDICAID

## 2020-07-28 VITALS — HEART RATE: 80 BPM | TEMPERATURE: 98 F | SYSTOLIC BLOOD PRESSURE: 124 MMHG | DIASTOLIC BLOOD PRESSURE: 69 MMHG

## 2020-07-28 DIAGNOSIS — E11.622 DIABETIC ULCER OF RIGHT LOWER LEG: ICD-10-CM

## 2020-07-28 DIAGNOSIS — L97.919 DIABETIC ULCER OF RIGHT LOWER LEG: ICD-10-CM

## 2020-07-28 DIAGNOSIS — S91.302D OPEN WOUND OF FOOT, LEFT, SUBSEQUENT ENCOUNTER: Primary | ICD-10-CM

## 2020-07-28 DIAGNOSIS — L97.529 TYPE 2 DIABETES MELLITUS WITH LEFT DIABETIC FOOT ULCER: ICD-10-CM

## 2020-07-28 DIAGNOSIS — E11.621 TYPE 2 DIABETES MELLITUS WITH LEFT DIABETIC FOOT ULCER: ICD-10-CM

## 2020-07-28 PROCEDURE — 99214 PR OFFICE/OUTPT VISIT, EST, LEVL IV, 30-39 MIN: ICD-10-PCS | Mod: ,,, | Performed by: FAMILY MEDICINE

## 2020-07-28 PROCEDURE — 99213 OFFICE O/P EST LOW 20 MIN: CPT

## 2020-07-28 PROCEDURE — 99214 OFFICE O/P EST MOD 30 MIN: CPT | Mod: ,,, | Performed by: FAMILY MEDICINE

## 2020-07-28 NOTE — PROGRESS NOTES
[facility]                Progress Note    Subjective:       Patient ID: Marvin Ray is a 60 y.o. male.    Chief Complaint: No chief complaint on file.    Patient presenting today with follow up for diabetic foot wounds to bilateral feet.  He is with his sister and has no new complaints.  No pain in wounds.  No increased drainage.  He denies any fevers, chills, sweats, shortness of breath, or palpitations.  He has been taking all medications as prescribed.  His sister continues to do dressing changes as directed      Review of Systems   Constitutional: Negative.    HENT: Negative.    Eyes: Negative.    Respiratory: Negative.    Cardiovascular: Positive for leg swelling. Negative for chest pain and palpitations.   Gastrointestinal: Negative.    Skin: Positive for wound.   Neurological: Negative.    Psychiatric/Behavioral: Negative.          Objective:        Physical Exam  Constitutional:       Appearance: Normal appearance.   HENT:      Head: Normocephalic and atraumatic.      Right Ear: External ear normal.      Left Ear: External ear normal.      Nose: Nose normal.      Mouth/Throat:      Mouth: Mucous membranes are moist.      Pharynx: Oropharynx is clear.   Eyes:      Extraocular Movements: Extraocular movements intact.      Pupils: Pupils are equal, round, and reactive to light.   Neck:      Musculoskeletal: Normal range of motion and neck supple.   Cardiovascular:      Rate and Rhythm: Normal rate and regular rhythm.      Pulses: Normal pulses.      Heart sounds: Normal heart sounds.   Pulmonary:      Effort: Pulmonary effort is normal.      Breath sounds: Normal breath sounds.   Abdominal:      General: There is distension.      Palpations: Abdomen is soft.      Tenderness: There is no abdominal tenderness.   Skin:     General: Skin is warm and dry.      Comments: +bilateral lower extremity diabetic ulcers with mild-moderate slough present   Neurological:       Mental Status: He is alert.         Vitals:    07/28/20 1036   BP: 124/69   Pulse: 80   Temp: 97.7 °F (36.5 °C)       Assessment:           ICD-10-CM ICD-9-CM   1. Open wound of foot, left, subsequent encounter  S91.302D V58.89     892.0   2. Diabetic ulcer of right lower leg  E11.622 250.80    L97.919 707.10   3. Type 2 diabetes mellitus with left diabetic foot ulcer  E11.621 250.80    L97.529 707.15            Wound 11/02/18 Diabetic Ulcer plantar Foot (Active)   11/02/18     Pre-existing: Yes   Primary Wound Type: Diabetic ulc   Side: Left   Orientation: plantar   Location: Foot   Wound Number (optional):    Ankle-Brachial Index:    Pulses:    Removal Indication and Assessment:    Wound Outcome:    (Retired) Wound Type:    (Retired) Wound Length (cm):    (Retired) Wound Width (cm):    (Retired) Depth (cm):    Wound Description (Comments):    Removal Indications:    Wound Image   07/28/20 1000   Wound WDL ex 07/28/20 1000   Dressing Appearance Dry 07/28/20 1000   Drainage Characteristics/Odor Clear 07/28/20 1000   Appearance Pink 07/28/20 1000   Tissue loss description Full thickness 07/28/20 1000   Red (%), Wound Tissue Color 100 % 07/28/20 1000   Periwound Area Dry 07/28/20 1000   Wound Edges Defined 07/28/20 1000   Wound Length (cm) 0.5 cm 07/28/20 1000   Wound Width (cm) 1.5 cm 07/28/20 1000   Wound Depth (cm) 0.2 cm 07/28/20 1000   Wound Volume (cm^3) 0.15 cm^3 07/28/20 1000   Wound Surface Area (cm^2) 0.75 cm^2 07/28/20 1000   Care Cleansed with:;Antimicrobial agent;Sterile normal saline 07/28/20 1000   Off Loading Off loading shoe 07/28/20 1000   Dressing Change Due 08/04/20 07/28/20 1000            Wound 05/08/20 1015 Left Foot (Active)   05/08/20 1015    Pre-existing: Yes   Primary Wound Type:    Side: Left   Orientation:    Location: Foot   Wound Number (optional):    Ankle-Brachial Index:    Pulses:    Removal Indication and Assessment:    Wound Outcome:    (Retired) Wound Type:    (Retired) Wound  Length (cm):    (Retired) Wound Width (cm):    (Retired) Depth (cm):    Wound Description (Comments):    Removal Indications:    Wound Image   07/28/20 1000   Wound WDL ex 07/28/20 1000   Dressing Appearance Dry 07/28/20 1000   Drainage Characteristics/Odor Clear 07/28/20 1000   Appearance Pink 07/28/20 1000   Tissue loss description Partial thickness 07/28/20 1000   Red (%), Wound Tissue Color 100 % 07/28/20 1000   Periwound Area Intact 07/28/20 1000   Wound Edges Defined 07/28/20 1000   Wound Length (cm) 2.3 cm 07/28/20 1000   Wound Width (cm) 5.5 cm 07/28/20 1000   Wound Depth (cm) 0.2 cm 07/28/20 1000   Wound Volume (cm^3) 2.53 cm^3 07/28/20 1000   Wound Surface Area (cm^2) 12.65 cm^2 07/28/20 1000   Care Cleansed with:;Antimicrobial agent;Sterile normal saline 07/28/20 1000   Off Loading Off loading shoe 07/28/20 1000   Dressing Change Due 08/04/20 07/28/20 1000            Wound 06/16/20 1037 Venous Ulcer Right medial Leg (Active)   06/16/20 1037    Pre-existing: No   Primary Wound Type: Venous ulcer   Side: Right   Orientation: medial   Location: Leg   Wound Number (optional):    Ankle-Brachial Index:    Pulses:    Removal Indication and Assessment:    Wound Outcome:    (Retired) Wound Type:    (Retired) Wound Length (cm):    (Retired) Wound Width (cm):    (Retired) Depth (cm):    Wound Description (Comments):    Removal Indications:    Wound Image   07/28/20 1000   Wound WDL ex 07/28/20 1000   Dressing Appearance Moist drainage 07/28/20 1000   Drainage Amount Moderate 07/28/20 1000   Drainage Characteristics/Odor Yellow 07/28/20 1000   Appearance Red;Tan 07/28/20 1000   Tissue loss description Partial thickness 07/28/20 1000   Red (%), Wound Tissue Color 50 % 07/28/20 1000   Yellow (%), Wound Tissue Color 50 % 07/28/20 1000   Periwound Area Intact 07/28/20 1000   Wound Edges Defined 07/28/20 1000   Wound Length (cm) 2.3 cm 07/28/20 1000   Wound Width (cm) 2.3 cm 07/28/20 1000   Wound Depth (cm) 0.2 cm  07/28/20 1000   Wound Volume (cm^3) 1.06 cm^3 07/28/20 1000   Wound Surface Area (cm^2) 5.29 cm^2 07/28/20 1000   Care Cleansed with:;Antimicrobial agent;Sterile normal saline 07/28/20 1000   Off Loading Off loading shoe 07/28/20 1000           Plan:                Orders Placed This Encounter   Procedures    Change dressing     Comments    Right foot/toe wound: Clean with saline and cover with endoform,    Iodosorb, mextra, and tab foam long secured with cast padding x 2 and stockinet. Change every 3 days.     Left foot wound: Clean wounds with saline. Cavilon periwound. Adia to wound bed with aquacel ag foam secured with cast padding x 3 with stockinet. Tab foam long to ankle prior to securing with cast padding to protect shearing injury. Change every 3 days.        Follow up in about 2 weeks (around 8/11/2020), or if symptoms worsen or fail to improve.     Rubén Davis MD

## 2020-07-29 ENCOUNTER — HOSPITAL ENCOUNTER (OUTPATIENT)
Dept: CARDIOLOGY | Facility: HOSPITAL | Age: 60
Discharge: HOME | End: 2020-07-29
Attending: SURGERY
Payer: MEDICAID

## 2020-07-29 DIAGNOSIS — I70.233 ATHEROSCLEROSIS OF NATIVE ARTERY OF RIGHT LOWER EXTREMITY WITH ULCERATION OF ANKLE: ICD-10-CM

## 2020-07-29 DIAGNOSIS — I70.245 ATHEROSCLEROSIS OF NATIVE ARTERY OF LEFT LOWER EXTREMITY WITH ULCERATION OF OTHER PART OF FOOT: ICD-10-CM

## 2020-07-29 PROCEDURE — 93925 LOWER EXTREMITY STUDY: CPT | Mod: 26,,, | Performed by: SURGERY

## 2020-07-29 PROCEDURE — 93922 CV US ANKLE BRACHIAL INDICES WO STRESS W TOE TRACINGS (CUPID ONLY): ICD-10-PCS | Mod: 26,,, | Performed by: SURGERY

## 2020-07-29 PROCEDURE — 93925 LOWER EXTREMITY STUDY: CPT | Mod: 50

## 2020-07-29 PROCEDURE — 93925 CV US DOPPLER ARTERIAL LEGS BILATERAL (CUPID ONLY): ICD-10-PCS | Mod: 26,,, | Performed by: SURGERY

## 2020-07-29 PROCEDURE — 93922 UPR/L XTREMITY ART 2 LEVELS: CPT | Mod: 26,,, | Performed by: SURGERY

## 2020-07-29 PROCEDURE — 93922 UPR/L XTREMITY ART 2 LEVELS: CPT | Mod: 50

## 2020-07-31 ENCOUNTER — HOSPITAL ENCOUNTER (OUTPATIENT)
Dept: INTERVENTIONAL RADIOLOGY/VASCULAR | Facility: HOSPITAL | Age: 60
Discharge: HOME | End: 2020-07-31
Attending: FAMILY MEDICINE
Payer: MEDICAID

## 2020-07-31 VITALS — DIASTOLIC BLOOD PRESSURE: 67 MMHG | SYSTOLIC BLOOD PRESSURE: 129 MMHG

## 2020-07-31 DIAGNOSIS — R18.8 OTHER ASCITES: Primary | ICD-10-CM

## 2020-07-31 LAB
LEFT ABI: 1.84
LEFT ANT TIBIAL SYS PSV: 108 CM/S
LEFT ARM BP: 133 MMHG
LEFT CFA PSV: 107 CM/S
LEFT DORSALIS PEDIS: 254 MMHG
LEFT EXTERNAL ILIAC PSV: 56 CM/S
LEFT PERONEAL SYS PSV: 75 CM/S
LEFT POPLITEAL PSV: 102 CM/S
LEFT POST TIBIAL SYS PSV: 0 CM/S
LEFT POSTERIOR TIBIAL: 40 MMHG
LEFT PROFUNDA SYS PSV: 147 CM/S
LEFT SUPER FEMORAL DIST SYS PSV: 94 CM/S
LEFT SUPER FEMORAL MID SYS PSV: 63 CM/S
LEFT SUPER FEMORAL OSTIAL SYS PSV: 132 CM/S
LEFT SUPER FEMORAL PROX SYS PSV: 152 CM/S
LEFT TIB/PER TRUNK SYS PSV: 368 CM/S
OHS CV LEFT LOWER EXTREMITY ABI (NO CALC): 0.29
OHS CV RIGHT ABI LOWER EXTREMITY (NO CALC): 0.3
RIGHT ABI: 1.84
RIGHT ANT TIBIAL SYS PSV: 42 CM/S
RIGHT ARM BP: 138 MMHG
RIGHT CFA PSV: 55 CM/S
RIGHT DORSALIS PEDIS: 254 MMHG
RIGHT EXTERNAL ILLIAC PSV: 65 CM/S
RIGHT PERONEAL SYS PSV: 53 CM/S
RIGHT POPLITEAL PSV: 59 CM/S
RIGHT POST TIBIAL SYS PSV: 0 CM/S
RIGHT POSTERIOR TIBIAL: 41 MMHG
RIGHT PROFUNDA SYS PSV: 88 CM/S
RIGHT SUPER FEMORAL DIST SYS PSV: 79 CM/S
RIGHT SUPER FEMORAL MID SYS PSV: 44 CM/S
RIGHT SUPER FEMORAL OSTIAL SYS PSV: 64 CM/S
RIGHT SUPER FEMORAL PROX SYS PSV: 36 CM/S
RIGHT TIB/PER TRUNK SYS PSV: 162 CM/S

## 2020-07-31 PROCEDURE — 49083 ABD PARACENTESIS W/IMAGING: CPT | Mod: ,,, | Performed by: RADIOLOGY

## 2020-07-31 PROCEDURE — 49083 IR PARACENTESIS NO IMAGING: ICD-10-PCS | Mod: ,,, | Performed by: RADIOLOGY

## 2020-07-31 PROCEDURE — 49083 ABD PARACENTESIS W/IMAGING: CPT

## 2020-07-31 RX ORDER — ALBUMIN HUMAN 250 G/1000ML
50 SOLUTION INTRAVENOUS ONCE AS NEEDED
Status: CANCELLED | OUTPATIENT
Start: 2020-07-31 | End: 2031-12-28

## 2020-07-31 NOTE — BRIEF OP NOTE
Radiology Post-Procedure Note     Pre Op Diagnosis: Recurrent painful, tense ascites  Post Op Diagnosis: Same     Procedure: U/S-guided therapeutic LVP     Procedure performed by: Zach Cha MD     Written Informed Consent Obtained: Yes  Specimen Removed: YES, 4.0-L of thin, straw-colored ascitic fluid  Estimated Blood Loss: none     Findings:   1. Successful therapeutic large-volume paracentesis with local anesthetic only and albumin infusion post PRN as indicated per institutional protocol. Patient tolerated the procedure well. No immediate post-procedural complications noted.       Thank you for considering IR for the care of your patient.      Zach Cha MD  Interventional Radiology

## 2020-07-31 NOTE — H&P
Radiology History & Physical      SUBJECTIVE:     Chief Complaint: Recurrent painful, tense ascites     History of Present Illness:  Marvin Ray is a 58 y.o. male with PMHx of CKD III and combined systolic/diastolic CHF complicated by recurrent abdominal distension and pain 2/2 recurrent painful, tense ascites without TTP on PE to suggest SBP requiring frequent  therapeutic LVP.      A new outpatient IR consult placed for US-guided therapeutic paracentesis.      Past Medical History:   Diagnosis Date    Arthritis     Cellulitis     CKD (chronic kidney disease), stage III     Coronary artery disease     Diabetes mellitus     Diabetic retinopathy     Diabetic ulcer of left foot     Glaucoma     Gout     Hyperlipemia     Hypertension     ICD (implantable cardioverter-defibrillator) in place 11/02/2018    Left chest    Non-pressure chronic ulcer of other part of left foot with fat layer exposed 10/23/2018    PVD (peripheral vascular disease)     Type 2 diabetes mellitus with left diabetic foot ulcer 10/29/2018    Unsteady gait     uses a wheelchair     Past Surgical History:   Procedure Laterality Date    AHMED GLAUCOMA IMPLANT Left 2011    DONE AT [facility]    AORTOGRAPHY WITH SERIALOGRAPHY Left 4/30/2019    Procedure: AORTOGRAM, WITH SERIALOGRAPHY, LEFT LOWER EXTREMITY, POSSIBLE INTERVENTION;  Surgeon: Mitch Chan MD;  Location: [facility];  Service: Vascular;  Laterality: Left;  RN PRE OP 4-23-19.  NO H & P, No Cosent  [place]    BAERVELDT GLAUCOMA IMPLANT Left 2012    WITH CATARACT EXTRACTION//DONE AT [facility]    CARDIAC CATHETERIZATION Left 05/2016    CARDIAC DEFIBRILLATOR PLACEMENT Left 11/02/2018    CATARACT EXTRACTION W/  INTRAOCULAR LENS IMPLANT Left 2012    WITH BAERVEDT//DONE AT [facility]    CATARACT EXTRACTION W/  INTRAOCULAR LENS IMPLANT Right 09/26/2018    COMPLEX ()    CB DESTRUCTION WITH CYCLO G6 Left 02/15/2017        CYST REMOVAL      DEBRIDEMENT OF  FOOT  12/28/2018    Procedure: DEBRIDEMENT, FOOT;  Surgeon: Mitch Wall MD;  Location: [facility] OR;  Service: Vascular;;    DEBRIDEMENT OF FOOT  6/5/2019    Procedure: DEBRIDEMENT, FOOT;  Surgeon: Mitch Wall MD;  Location: [facility] OR;  Service: Vascular;;    EXAMINATION UNDER ANESTHESIA Left 12/5/2018    Procedure: Exam under anesthesia, left foot debridement, washout and all other indicated procedures;  Surgeon: Mitch Wall MD;  Location: [facility] OR;  Service: Vascular;  Laterality: Left;  1030AM START  RN PREOP 12/3/2018-----AICD  SEEN BY DR JAMES 12/4    EXAMINATION UNDER ANESTHESIA Left 2/13/2019    Procedure: LEFT FOOT WOUND DEBRIDEMENT, WASHOUT AND ALL OTHER INDICATED PROCEDURES;  Surgeon: Mitch Wall MD;  Location: [facility] OR;  Service: Vascular;  Laterality: Left;  requesting 1st case start  THIS CASE 1ST PER DR WALL ON 2/1/19 @ 844AM -[place]  RN PREOP 2/6/2019  HAS CARDIAC CLEARANCE    EXAMINATION UNDER ANESTHESIA Left 12/28/2018    Procedure: Exam under anesthesia, left foot debridement, left foot washout, possible left second through fifth metatarsal resection;  Surgeon: Mitch Wall MD;  Location: [facility] OR;  Service: Vascular;  Laterality: Left;  1:00pm start per julissa @ 8:58am  RN  PHONE PRE OP 12-27-18--=Pt coming from [facility] on [address]  NEED CONSENT    EXAMINATION UNDER ANESTHESIA Left 6/5/2019    Procedure: LEFT FOOT DEBRIDEMENT, WASHOUT AND ALL OTHER INDICATED PROCEDURES;  Surgeon: Mitch Wall MD;  Location: [facility] OR;  Service: Vascular;  Laterality: Left;  RN PRE OP 5-31-19------ICD------NEED CONSENT    INCISION AND DRAINAGE Left 11/14/2018    Procedure: Incision and Drainage, left lower extremity debridement, washout;  Surgeon: Mitch Wall MD;  Location: [facility] OR;  Service: Vascular;  Laterality: Left;    INCISION AND DRAINAGE Left 11/16/2018    Procedure: INCISION AND DRAINAGE;  Surgeon: Mitch Wall MD;  Location: [facility] OR;   Service: Vascular;  Laterality: Left;    INTRAOCULAR PROSTHESES INSERTION Right 9/26/2018    Procedure: INSERTION, IOL PROSTHESIS;  Surgeon: Perla Cortés MD;  Location: [address];  Service: Ophthalmology;  Laterality: Right;    PERITONEOCENTESIS N/A 3/1/2019    Procedure: PARACENTESIS, ABDOMINAL, INITIAL;  Surgeon: Dosc Diagnostic Provider;  Location: [facility] OR;  Service: Radiology;  Laterality: N/A;    PERITONEOCENTESIS N/A 3/25/2019    Procedure: PARACENTESIS, ABDOMINAL, INITIAL;  Surgeon: Dosc Diagnostic Provider;  [facility];  Service: Radiology;  Laterality: N/A;    PERITONEOCENTESIS N/A 7/22/2019    Procedure: PARACENTESIS, ABDOMINAL, INITIAL;  Surgeon: Dosc Diagnostic Provider;  Location: [facility] OR;  Service: Radiology;  Laterality: N/A;    PERITONEOCENTESIS N/A 8/16/2019    Procedure: PARACENTESIS, ABDOMINAL, INITIAL;  Surgeon: Dosc Diagnostic Provider;  Location: [facility] OR;  Service: Radiology;  Laterality: N/A;    PERITONEOCENTESIS N/A 9/26/2019    Procedure: PARACENTESIS, ABDOMINAL;  Surgeon: Dosc Diagnostic Provider;  Location: [facility] OR;  Service: Radiology;  Laterality: N/A;    PERITONEOCENTESIS N/A 10/11/2019    Procedure: PARACENTESIS, ABDOMINAL;  Surgeon: Dosc Diagnostic Provider;  Location: [facility] OR;  Service: Radiology;  Laterality: N/A;    PERITONEOCENTESIS N/A 10/31/2019    Procedure: PARACENTESIS, ABDOMINAL;  Surgeon: Dosc Diagnostic Provider;  Location: [facility] OR;  Service: Radiology;  Laterality: N/A;    PERITONEOCENTESIS N/A 11/18/2019    Procedure: PARACENTESIS, ABDOMINAL;  Surgeon: Dosc Diagnostic Provider;  [facility];  Service: Radiology;  Laterality: N/A;    PERITONEOCENTESIS N/A 12/16/2019    Procedure: PARACENTESIS, ABDOMINAL;  Surgeon: Dosc Diagnostic Provider;  Location: [facility] OR;  Service: Radiology;  Laterality: N/A;  2PM START    PERITONEOCENTESIS N/A 2/7/2020    Procedure: PARACENTESIS, ABDOMINAL;  Surgeon: Dosc Diagnostic Provider;  Location: [facility] OR;   Service: Radiology;  Laterality: N/A;  REQUESTED 10:30A START    PERITONEOCENTESIS N/A 2/19/2020    Procedure: PARACENTESIS, ABDOMINAL;  Surgeon: Dos Diagnostic Provider;  Location: [facility] OR;  Service: Radiology;  Laterality: N/A;    PERITONEOCENTESIS N/A 2/28/2020    Procedure: PARACENTESIS, ABDOMINAL;  Surgeon: Dos Diagnostic Provider;  Location: [facility] OR;  Service: Radiology;  Laterality: N/A;  REQUESTED 10AM START    PHACOEMULSIFICATION OF CATARACT Right 9/26/2018    Procedure: PHACOEMULSIFICATION, CATARACT;  Surgeon: Perla Cortés MD;  [facility];  Service: Ophthalmology;  Laterality: Right;    REPEAT ABDOMINAL PARACENTESIS N/A 2/11/2019    Procedure: PARACENTESIS, ABDOMINAL, REPEAT;  Surgeon: [facility] Diagnostic Provider;  Location: [facility] OR;  Service: Radiology;  Laterality: N/A;  1PM START    REPEAT ABDOMINAL PARACENTESIS N/A 9/12/2019    Procedure: PARACENTESIS, ABDOMINAL, REPEAT;  Surgeon: [facility] Diagnostic Provider;  Location: [facility] OR;  Service: Radiology;  Laterality: N/A;  9AM START    REPEAT ABDOMINAL PARACENTESIS N/A 12/2/2019    Procedure: PARACENTESIS, ABDOMINAL, REPEAT;  Surgeon: Dos Diagnostic Provider;  Location: [facility] OR;  Service: Radiology;  Laterality: N/A;  3PM START    REPEAT ABDOMINAL PARACENTESIS N/A 1/10/2020    Procedure: PARACENTESIS, ABDOMINAL, REPEAT;  Surgeon: Dos Diagnostic Provider;  Location: [facility] OR;  Service: Radiology;  Laterality: N/A;  1130AM START    REPEAT ABDOMINAL PARACENTESIS N/A 1/20/2020    Procedure: PARACENTESIS, ABDOMINAL, REPEAT;  Surgeon: Dos Diagnostic Provider;  Location: [facility] OR;  Service: Radiology;  Laterality: N/A;  1PM START    REPEAT ABDOMINAL PARACENTESIS N/A 1/31/2020    Procedure: PARACENTESIS, ABDOMINAL, REPEAT;  Surgeon: [facility] Diagnostic Provider;  Location: [facility] OR;  Service: Radiology;  Laterality: N/A;  10AM START    REPEAT ABDOMINAL PARACENTESIS N/A 3/16/2020    Procedure: PARACENTESIS, ABDOMINAL, REPEAT;  Surgeon: Dosc  Diagnostic Provider;  Location: [facility] OR;  Service: Radiology;  Laterality: N/A;  10AM START    REPEAT ABDOMINAL PARACENTESIS N/A 3/30/2020    Procedure: PARACENTESIS, ABDOMINAL, REPEAT;  Surgeon: [facility] Diagnostic Provider;  Location: [facility] OR;  Service: Radiology;  Laterality: N/A;    REPEAT ABDOMINAL PARACENTESIS N/A 4/9/2020    Procedure: PARACENTESIS, ABDOMINAL, REPEAT;  Surgeon: Dosc Diagnostic Provider;  Location: [facility] OR;  Service: Radiology;  Laterality: N/A;  1130AM START    REPEAT ABDOMINAL PARACENTESIS N/A 5/8/2020    Procedure: PARACENTESIS, ABDOMINAL, REPEAT;  Surgeon: Dos Diagnostic Provider;  Location: [facility] OR;  Service: Radiology;  Laterality: N/A;  9AM START    REPEAT ABDOMINAL PARACENTESIS N/A 5/21/2020    Procedure: PARACENTESIS, ABDOMINAL, REPEAT;  Surgeon: [facility] Diagnostic Provider;  Location: [facility] OR;  Service: Radiology;  Laterality: N/A;  10AM START    REPEAT ABDOMINAL PARACENTESIS N/A 6/5/2020    Procedure: PARACENTESIS, ABDOMINAL, REPEAT;  Surgeon: [facility] Diagnostic Provider;  Location: [facility] OR;  Service: Radiology;  Laterality: N/A;  NOON START    REPEAT ABDOMINAL PARACENTESIS N/A 7/10/2020    Procedure: PARACENTESIS, ABDOMINAL, REPEAT;  Surgeon: [facility] Diagnostic Provider;  Location: [facility] OR;  Service: Radiology;  Laterality: N/A;  1PM START    REVISION OF PROCEDURE INVOLVING GLAUCOMA DRAINAGE DEVICE Left 10/2/2019    EXTRUDING BAERVELDT /WITH PERICARDIAL PATCH GRAFT ()    Right foot surgery  10/2014    TOE AMPUTATION Right     first and second    TOE AMPUTATION Left 11/16/2018    Procedure: AMPUTATION, TOES  2-5;  Surgeon: Mitch Chan MD;  Location: [facility] OR;  Service: Vascular;  Laterality: Left;    TOE AMPUTATION Left 12/5/2018    Procedure: AMPUTATION, TOE;  Surgeon: Mitch Chan MD;  Location: [facility] OR;  Service: Vascular;  Laterality: Left;  left great toe    TONSILLECTOMY       Home Meds:   Prior to Admission medications    Medication Sig Start Date  "End Date Taking? Authorizing Provider   allopurinoL (ZYLOPRIM) 300 MG tablet TAKE 1 TABLET(300 MG) BY MOUTH EVERY DAY 6/15/20   Rubén Davis MD   aspirin (ECOTRIN) 81 MG EC tablet Take 81 mg by mouth once daily.    Historical Provider, MD   bisacodyl (DULCOLAX) 5 mg EC tablet Take 5 mg by mouth daily as needed for Constipation.    Historical Provider, MD   brimonidine 0.2% (ALPHAGAN) 0.2 % Drop Place 1 drop into both eyes 3 (three) times daily. 12/12/19   Perla Cortés MD   carvediloL (COREG) 3.125 MG tablet TAKE 1 TABLET(3.125 MG) BY MOUTH TWICE DAILY 6/21/20   Rubén Davis MD   coenzyme Q10 100 mg capsule Take 100 mg by mouth every morning.    Historical Provider, MD   dorzolamide-timolol 2-0.5% (COSOPT) 22.3-6.8 mg/mL ophthalmic solution Place 1 drop into both eyes 3 (three) times daily. 12/12/19   Perla Cortés MD   ezetimibe (ZETIA) 10 mg tablet TAKE 1 TABLET(10 MG) BY MOUTH EVERY DAY 7/16/20   Rubén Davis MD   fish oil-omega-3 fatty acids 300-1,000 mg capsule Take 1 capsule by mouth 2 (two) times daily. 1/23/19   Sara Ching MD   gabapentin (NEURONTIN) 100 MG capsule TAKE 2 CAPSULES(200 MG) BY MOUTH EVERY EVENING 7/28/20   Rubén Davis MD   insulin degludec-liraglutide (XULTOPHY 100/3.6) 100 unit-3.6 mg /mL (3 mL) InPn Inject 34 Units into the skin once daily. 1/28/20   Sheryl Madison NP   MULTIVIT,THER IRON,CA,FA & MIN (MULTIVITAMIN) Tab Take 1 tablet by mouth every morning.     Historical Provider, MD   pen needle, diabetic 31 gauge x 1/4" Ndle Use as directed  Patient not taking: Reported on 5/21/2020 8/11/16   Mike Bass MD   torsemide (DEMADEX) 20 MG Tab Take 4 tablets (80 mg total) by mouth 2 (two) times daily. 6/16/20 7/16/20  Rubén Davis MD     Anticoagulants/Antiplatelets: no anticoagulation    Allergies:   Review of patient's allergies indicates:   Allergen Reactions    Statins-hmg-coa reductase inhibitors      Generalized Pain    Onglyza [saxagliptin]  "    Penicillins Rash     Sedation History:  no adverse reactions    Review of Systems:   Hematological: no known coagulopathies  Respiratory: positive for - orthopnea and shortness of breath  Cardiovascular: positive for - dyspnea on exertion, orthopnea and shortness of breath  Gastrointestinal: positive for - abdominal pain and distension  Genito-Urinary: no dysuria, trouble voiding, or hematuria  Musculoskeletal: negative  Neurological: no TIA or stroke symptoms     OBJECTIVE:     Vital Signs (Most Recent)     Physical Exam:  General: no acute distress  Mental Status: alert and oriented to person, place and time  HEENT: normocephalic, atraumatic  Chest: mild labored breathing  Heart: regular heart rate  Abdomen: +distended. No TTP/r/g.  Extremity: moves all extremities    Laboratory  Lab Results   Component Value Date    INR 1.1 05/31/2019       Lab Results   Component Value Date    WBC 5.13 08/22/2019    HGB 9.9 (L) 08/22/2019    HCT 32.0 (L) 08/22/2019    MCV 86 08/22/2019     08/22/2019      Lab Results   Component Value Date     (H) 06/11/2020     06/11/2020    K 4.3 06/11/2020     06/11/2020    CO2 29 06/11/2020    BUN 56 (H) 06/11/2020    CREATININE 1.5 (H) 06/11/2020    CALCIUM 9.0 06/11/2020    MG 2.1 05/31/2019    ALT 12 08/22/2019    AST 14 08/22/2019    ALBUMIN 2.8 (L) 08/22/2019    BILITOT 0.5 08/22/2019     ASSESSMENT/PLAN:     57 y/o M with CKD III and combined systolic/diastolic CHF complicated by recurrent abdominal distension and pain 2/2 recurrent painful, tense ascites without TTP on PE to suggest SBP, requiring frequent large-volume therapeutic paracentesis.      1. Will attempt U/S-guided therapeutic paracentesis with local anesthetic only and albumin infusion post PRN per institutional protocol.      Risks (including, but not limited to, pain, bleeding, infection, damage to nearby structures, failure to obtain sufficient material for a diagnosis, the need for  additional procedures, and death), benefits, and alternatives were discussed with the patient. All questions were answered to the best of my abilities. The patient wishes to proceed with the procedure. Written informed consent was obtained.     Thank you for considering IR for the care of your patient.      Zach Cha MD  Interventional Radiology

## 2020-07-31 NOTE — DISCHARGE SUMMARY
Radiology Discharge Summary      Hospital Course: No complications    Admit Date: 7/31/2020  Discharge Date: 07/31/2020     Instructions Given to Patient: Yes    Diet: Resume prior diet     Activity: activity as tolerated    Description of Condition on Discharge: Good    Vital Signs (Most Recent): BP: 129/67 (07/31/20 1010)    Discharge Disposition: Home    Discharge Diagnosis:   60 y/o M with h/o recurrent painful, tense ascites s/p successful U/S-guided therapeutic LVP with local anesthetic only and albumin infusion post PRN as indicated per institutional protocol. Patient tolerated the procedure well. No immediate post-procedural complications noted.      Thank you for considering IR for the care of your patient.      Zach Cha MD  Interventional Radiology

## 2020-07-31 NOTE — NURSING
Procedure completed with 4 liters of Peritoneal fluid drained from abdomen without complications. Bandaid in place. VSS.

## 2020-08-04 ENCOUNTER — PATIENT OUTREACH (OUTPATIENT)
Dept: ADMINISTRATIVE | Facility: OTHER | Age: 60
End: 2020-08-04

## 2020-08-04 NOTE — PROGRESS NOTES
Requested updates within Care Everywhere.  Patient's chart was reviewed for overdue REYES topics.  Immunizations reconciled.

## 2020-08-11 ENCOUNTER — HOSPITAL ENCOUNTER (OUTPATIENT)
Dept: WOUND CARE | Facility: HOSPITAL | Age: 60
Discharge: HOME | End: 2020-08-11
Attending: FAMILY MEDICINE
Payer: MEDICAID

## 2020-08-11 ENCOUNTER — PATIENT MESSAGE (OUTPATIENT)
Dept: WOUND CARE | Facility: HOSPITAL | Age: 60
End: 2020-08-11

## 2020-08-11 VITALS — DIASTOLIC BLOOD PRESSURE: 62 MMHG | SYSTOLIC BLOOD PRESSURE: 114 MMHG | HEART RATE: 84 BPM | TEMPERATURE: 98 F

## 2020-08-11 DIAGNOSIS — S91.302D OPEN WOUND OF FOOT, LEFT, SUBSEQUENT ENCOUNTER: ICD-10-CM

## 2020-08-11 DIAGNOSIS — L97.516 DIABETIC ULCER OF TOE OF RIGHT FOOT ASSOCIATED WITH TYPE 2 DIABETES MELLITUS, WITH BONE INVOLVEMENT WITHOUT EVIDENCE OF NECROSIS: Primary | ICD-10-CM

## 2020-08-11 DIAGNOSIS — L97.919 DIABETIC ULCER OF RIGHT LOWER LEG: Primary | ICD-10-CM

## 2020-08-11 DIAGNOSIS — E11.621 DIABETIC ULCER OF TOE OF RIGHT FOOT ASSOCIATED WITH TYPE 2 DIABETES MELLITUS, WITH BONE INVOLVEMENT WITHOUT EVIDENCE OF NECROSIS: Primary | ICD-10-CM

## 2020-08-11 DIAGNOSIS — L97.529 TYPE 2 DIABETES MELLITUS WITH LEFT DIABETIC FOOT ULCER: ICD-10-CM

## 2020-08-11 DIAGNOSIS — E11.628 DIABETIC FOOT INFECTION: ICD-10-CM

## 2020-08-11 DIAGNOSIS — L97.516 DIABETIC ULCER OF TOE OF RIGHT FOOT ASSOCIATED WITH TYPE 2 DIABETES MELLITUS, WITH BONE INVOLVEMENT WITHOUT EVIDENCE OF NECROSIS: ICD-10-CM

## 2020-08-11 DIAGNOSIS — L08.9 DIABETIC FOOT INFECTION: ICD-10-CM

## 2020-08-11 DIAGNOSIS — E11.622 DIABETIC ULCER OF RIGHT LOWER LEG: Primary | ICD-10-CM

## 2020-08-11 DIAGNOSIS — E11.621 DIABETIC ULCER OF TOE OF RIGHT FOOT ASSOCIATED WITH TYPE 2 DIABETES MELLITUS, WITH BONE INVOLVEMENT WITHOUT EVIDENCE OF NECROSIS: ICD-10-CM

## 2020-08-11 DIAGNOSIS — E11.621 TYPE 2 DIABETES MELLITUS WITH LEFT DIABETIC FOOT ULCER: ICD-10-CM

## 2020-08-11 PROCEDURE — 87077 CULTURE AEROBIC IDENTIFY: CPT

## 2020-08-11 PROCEDURE — 87186 SC STD MICRODIL/AGAR DIL: CPT

## 2020-08-11 PROCEDURE — 87147 CULTURE TYPE IMMUNOLOGIC: CPT

## 2020-08-11 PROCEDURE — 11045 DEBRIDEMENT: ICD-10-PCS | Mod: ,,, | Performed by: FAMILY MEDICINE

## 2020-08-11 PROCEDURE — 11042 DBRDMT SUBQ TIS 1ST 20SQCM/<: CPT | Mod: ,,, | Performed by: FAMILY MEDICINE

## 2020-08-11 PROCEDURE — 11045 DBRDMT SUBQ TISS EACH ADDL: CPT | Mod: ,,, | Performed by: FAMILY MEDICINE

## 2020-08-11 PROCEDURE — 11042 DEBRIDEMENT: ICD-10-PCS | Mod: ,,, | Performed by: FAMILY MEDICINE

## 2020-08-11 PROCEDURE — 87070 CULTURE OTHR SPECIMN AEROBIC: CPT

## 2020-08-11 PROCEDURE — 11042 DBRDMT SUBQ TIS 1ST 20SQCM/<: CPT | Performed by: FAMILY MEDICINE

## 2020-08-11 PROCEDURE — 99214 OFFICE O/P EST MOD 30 MIN: CPT | Mod: ,,, | Performed by: FAMILY MEDICINE

## 2020-08-11 PROCEDURE — 27201912 HC WOUND CARE DEBRIDEMENT SUPPLIES: Performed by: FAMILY MEDICINE

## 2020-08-11 PROCEDURE — 99214 PR OFFICE/OUTPT VISIT, EST, LEVL IV, 30-39 MIN: ICD-10-PCS | Mod: ,,, | Performed by: FAMILY MEDICINE

## 2020-08-11 RX ORDER — CLINDAMYCIN HYDROCHLORIDE 300 MG/1
300 CAPSULE ORAL EVERY 8 HOURS
Qty: 30 CAPSULE | Refills: 0 | Status: SHIPPED | OUTPATIENT
Start: 2020-08-11 | End: 2020-08-21

## 2020-08-11 NOTE — PROGRESS NOTES
[facility]                Progress Note    Subjective:       Patient ID: Marvin Ray is a 60 y.o. male.    Chief Complaint: Non-healing Wound Follow Up and Diabetic Foot Ulcer    08/11/2020: F/U visit. Left foot wounds improving, no debridement needed. Right toe third bone exposed. Culture taken. New wound to right medial foot. Macerated periwound. Patient has excess drainage from right medial heel.  There is maceration to surrounding tissue.  Sister concerned after they changed dressings over the weekend.      Review of Systems   Constitutional: Negative.    HENT: Negative.    Eyes: Negative.    Respiratory: Negative.    Cardiovascular: Negative.    Gastrointestinal: Negative.    Musculoskeletal: Positive for myalgias.   Psychiatric/Behavioral: Negative.          Objective:        Physical Exam  Constitutional:       Appearance: Normal appearance.   HENT:      Head: Normocephalic and atraumatic.      Right Ear: External ear normal.      Left Ear: External ear normal.      Mouth/Throat:      Mouth: Mucous membranes are dry.   Eyes:      Extraocular Movements: Extraocular movements intact.      Pupils: Pupils are equal, round, and reactive to light.   Neck:      Musculoskeletal: Normal range of motion and neck supple.   Cardiovascular:      Rate and Rhythm: Normal rate and regular rhythm.   Pulmonary:      Effort: Pulmonary effort is normal. No respiratory distress.      Breath sounds: No wheezing.   Abdominal:      General: There is distension.      Palpations: Abdomen is soft.      Tenderness: There is no abdominal tenderness.   Skin:     General: Skin is warm and dry.      Comments: +left DFU's stable without slough  +right DFU's have maceration and slough present; bone exposed to dorsum of right second toe   Neurological:      Mental Status: He is alert.           Vitals:    08/11/20 1015   BP: 114/62   Pulse: 84   Temp: 97.9 °F (36.6 °C)  "    Debridement    Date/Time: 8/11/2020 8:08 AM  Performed by: Rubén Davis MD  Authorized by: Rubén Davis MD     Time out: Immediately prior to procedure a "time out" was called to verify the correct patient, procedure, equipment, support staff and site/side marked as required.    Consent Done?:  Yes (Written)  Local anesthesia used?: Yes    Local anesthetic:  Topical anesthetic  Anesthetic total (ml):  5    Wound Details:    Location:  Right foot    Location:  Right Heel       Length (cm):  6       Area (sq cm):  24       Width (cm):  4       Percent Debrided (%):  100       Depth (cm):  0.1       Total Area Debrided (sq cm):  24    Depth of debridement:  Subcutaneous tissue    Tissue debrided:  Dermis, Epidermis and Subcutaneous    Devitalized tissue debrided:  Biofilm, Fibrin and Slough    Instruments:  Curette    Bleeding:  Minimal  Hemostasis Achieved: Yes    Method Used:  Pressure  Patient tolerance:  Patient tolerated the procedure well with no immediate complications      Assessment:           ICD-10-CM ICD-9-CM   1. Diabetic ulcer of right lower leg  E11.622 250.80    L97.919 707.10   2. Open wound of foot, left, subsequent encounter  S91.302D V58.89     892.0   3. Type 2 diabetes mellitus with left diabetic foot ulcer  E11.621 250.80    L97.529 707.15   4. Diabetic ulcer of toe of right foot associated with type 2 diabetes mellitus, with bone involvement without evidence of necrosis  E11.621 250.80    L97.516 707.15   5. Diabetic foot infection  E11.628 250.80    L08.9 686.9            Wound 11/02/18 Diabetic Ulcer plantar Foot (Active)   11/02/18     Pre-existing: Yes   Primary Wound Type: Diabetic ulc   Side: Left   Orientation: plantar   Location: Foot   Wound Number (optional):    Ankle-Brachial Index:    Pulses:    Removal Indication and Assessment:    Wound Outcome:    (Retired) Wound Type:    (Retired) Wound Length (cm):    (Retired) Wound Width (cm):    (Retired) Depth (cm):    Wound " Description (Comments):    Removal Indications:    Wound Image   08/11/20 1000   Wound WDL ex 08/11/20 1000   Dressing Appearance Intact;Moist drainage 08/11/20 1000   Drainage Amount Small 08/11/20 1000   Drainage Characteristics/Odor Yellow 08/11/20 1000   Appearance Pink;Red 08/11/20 1000   Tissue loss description Partial thickness 08/11/20 1000   Black (%), Wound Tissue Color 0 % 08/11/20 1000   Red (%), Wound Tissue Color 90 % 08/11/20 1000   Yellow (%), Wound Tissue Color 10 % 08/11/20 1000   Periwound Area Scar tissue;Intact 08/11/20 1000   Wound Edges Irregular 08/11/20 1000   Wound Length (cm) 5.9 cm 08/11/20 1000   Wound Width (cm) 5.4 cm 08/11/20 1000   Wound Depth (cm) 0.2 cm 08/11/20 1000   Wound Volume (cm^3) 6.37 cm^3 08/11/20 1000   Wound Surface Area (cm^2) 31.86 cm^2 08/11/20 1000   Tunneling (depth (cm)/location) 0 08/11/20 1000   Undermining (depth (cm)/location) 0 08/11/20 1000   Care Wound cleanser;Sterile normal saline;Soap and water 08/11/20 1000   Dressing Applied;Collagen;Abd pad 08/11/20 1000   Off Loading Football dressing 08/11/20 1000   Dressing Change Due 08/18/20 08/11/20 1000            Wound 05/08/20 1015 Left Foot (Active)   05/08/20 1015    Pre-existing: Yes   Primary Wound Type:    Side: Left   Orientation:    Location: Foot   Wound Number (optional):    Ankle-Brachial Index:    Pulses:    Removal Indication and Assessment:    Wound Outcome:    (Retired) Wound Type:    (Retired) Wound Length (cm):    (Retired) Wound Width (cm):    (Retired) Depth (cm):    Wound Description (Comments):    Removal Indications:    Wound Image   08/11/20 1000   Wound WDL ex 08/11/20 1000   Dressing Appearance Intact;Moist drainage 08/11/20 1000   Drainage Amount Small 08/11/20 1000   Drainage Characteristics/Odor Yellow 08/11/20 1000   Appearance Pink;Red;White;Yellow;Hypergranulation 08/11/20 1000   Tissue loss description Partial thickness 08/11/20 1000   Black (%), Wound Tissue Color 0 % 08/11/20  1000   Red (%), Wound Tissue Color 90 % 08/11/20 1000   Yellow (%), Wound Tissue Color 10 % 08/11/20 1000   Periwound Area Macerated;Scar tissue 08/11/20 1000   Wound Edges Irregular 08/11/20 1000   Wound Length (cm) 5.5 cm 08/11/20 1000   Wound Width (cm) 3.5 cm 08/11/20 1000   Wound Depth (cm) 0.1 cm 08/11/20 1000   Wound Volume (cm^3) 1.92 cm^3 08/11/20 1000   Wound Surface Area (cm^2) 19.25 cm^2 08/11/20 1000   Tunneling (depth (cm)/location) 0 08/11/20 1000   Undermining (depth (cm)/location) 0 08/11/20 1000   Care Soap and water;Sterile normal saline;Wound cleanser 08/11/20 1000   Dressing Applied;Collagen;Abd pad 08/11/20 1000   Off Loading Football dressing 08/11/20 1000   Dressing Change Due 08/18/20 08/11/20 1000            Wound 08/11/20 1051 Diabetic Ulcer Right medial Malleolus/Ankle (Active)   08/11/20 1051    Pre-existing: No   Primary Wound Type: Diabetic ulc   Side: Right   Orientation: medial   Location: Malleolus/Ankle   Wound Number (optional):    Ankle-Brachial Index:    Pulses:    Removal Indication and Assessment:    Wound Outcome:    (Retired) Wound Type:    (Retired) Wound Length (cm):    (Retired) Wound Width (cm):    (Retired) Depth (cm):    Wound Description (Comments):    Removal Indications:    Wound Image   08/11/20 1000   Wound WDL ex 08/11/20 1000   Dressing Appearance Intact;Moist drainage 08/11/20 1000   Drainage Amount Moderate 08/11/20 1000   Drainage Characteristics/Odor Yellow 08/11/20 1000   Appearance Pink;Red;Slough 08/11/20 1000   Tissue loss description Partial thickness 08/11/20 1000   Black (%), Wound Tissue Color 0 % 08/11/20 1000   Red (%), Wound Tissue Color 20 % 08/11/20 1000   Yellow (%), Wound Tissue Color 80 % 08/11/20 1000   Periwound Area Redness 08/11/20 1000   Wound Edges Defined 08/11/20 1000   Wound Length (cm) 2 cm 08/11/20 1000   Wound Width (cm) 2.4 cm 08/11/20 1000   Wound Depth (cm) 0.2 cm 08/11/20 1000   Wound Volume (cm^3) 0.96 cm^3 08/11/20 1000    Wound Surface Area (cm^2) 4.8 cm^2 08/11/20 1000   Tunneling (depth (cm)/location) 0 08/11/20 1000   Undermining (depth (cm)/location) 0 08/11/20 1000   Care Soap and water;Sterile normal saline;Wound cleanser 08/11/20 1000   Dressing Applied;Collagen;Foam;Other (see comments) 08/11/20 1000   Periwound Care Other (see comments) 08/11/20 1000   Off Loading Football dressing 08/11/20 1000   Dressing Change Due 08/18/20 08/11/20 1000            Wound 08/11/20 1052 Diabetic Ulcer Right medial Foot (Active)   08/11/20 1052    Pre-existing: No   Primary Wound Type: Diabetic ulc   Side: Right   Orientation: medial   Location: Foot   Wound Number (optional):    Ankle-Brachial Index:    Pulses:    Removal Indication and Assessment:    Wound Outcome:    (Retired) Wound Type:    (Retired) Wound Length (cm):    (Retired) Wound Width (cm):    (Retired) Depth (cm):    Wound Description (Comments):    Removal Indications:    Wound Image   08/11/20 1000   Wound WDL ex 08/11/20 1000   Dressing Appearance Open to air 08/11/20 1000   Drainage Amount Small 08/11/20 1000   Drainage Characteristics/Odor Yellow 08/11/20 1000   Appearance White;Pink;Wet 08/11/20 1000   Tissue loss description Partial thickness 08/11/20 1000   Black (%), Wound Tissue Color 0 % 08/11/20 1000   Red (%), Wound Tissue Color 100 % 08/11/20 1000   Yellow (%), Wound Tissue Color 0 % 08/11/20 1000   Periwound Area Macerated 08/11/20 1000   Wound Edges Irregular 08/11/20 1000   Wound Length (cm) 6 cm 08/11/20 1000   Wound Width (cm) 4 cm 08/11/20 1000   Wound Depth (cm) 0.1 cm 08/11/20 1000   Wound Volume (cm^3) 2.4 cm^3 08/11/20 1000   Wound Surface Area (cm^2) 24 cm^2 08/11/20 1000   Tunneling (depth (cm)/location) 0 08/11/20 1000   Undermining (depth (cm)/location) 0 08/11/20 1000   Care Soap and water;Sterile normal saline;Wound cleanser 08/11/20 1000   Dressing Applied;Other (see comments) 08/11/20 1000   Periwound Care Other (see comments) 08/11/20 1000    Off Loading Football dressing 08/11/20 1000   Dressing Change Due 08/18/20 08/11/20 1000            Incision/Site 06/16/20 0930 Right Toe, third anterior (Active)   06/16/20 0930   Present Prior to Hospital Arrival?: Yes   Side: Right   Location: Toe, third   Orientation: anterior   Incision Type:    Closure Method:    Additional Comments:    Removal Indication and Assessment:    Wound Outcome:    Removal Indications:    Wound Image   08/11/20 1000   Incision WDL ex 08/11/20 1000   Dressing Appearance Intact;Moist drainage 08/11/20 1000   Drainage Amount Small 08/11/20 1000   Drainage Characteristics/Odor Yellow 08/11/20 1000   Appearance Pink;Red;Bone 08/11/20 1000   Black (%), Wound Tissue Color 0 % 08/11/20 1000   Red (%), Wound Tissue Color 100 % 08/11/20 1000   Yellow (%), Wound Tissue Color 0 % 08/11/20 1000   Periwound Area Redness;Swelling 08/11/20 1000   Wound Edges Defined 08/11/20 1000   Wound Length (cm) 0.4 cm 08/11/20 1000   Wound Width (cm) 0.7 cm 08/11/20 1000   Wound Depth (cm) 0.2 cm 08/11/20 1000   Wound Volume (cm^3) 0.06 cm^3 08/11/20 1000   Wound Surface Area (cm^2) 0.28 cm^2 08/11/20 1000   Tunneling (depth (cm)/location) 0 08/11/20 1000   Undermining (depth (cm)/location) 0 08/11/20 1000   Care Soap and water;Sterile normal saline;Wound cleanser 08/11/20 1000   Dressing Applied;Other (see comments);Foam 08/11/20 1000   Periwound Care Other (see comments) 08/11/20 1000   Off Loading Football dressing 08/11/20 1000   Dressing Change Due 08/18/20 08/11/20 1000           Plan:                Orders Placed This Encounter   Procedures    Aerobic culture    CBC auto differential     Standing Status:   Future     Number of Occurrences:   1     Standing Expiration Date:   8/11/2021    Comprehensive metabolic panel     Standing Status:   Future     Number of Occurrences:   1     Standing Expiration Date:   8/11/2021    C-Reactive Protein     Standing Status:   Future     Number of Occurrences:    1     Standing Expiration Date:   8/11/2021    Sedimentation rate     Standing Status:   Future     Number of Occurrences:   1     Standing Expiration Date:   10/10/2021    Change dressing     Left foot/ plantar foot- promogran, ABD pads X2, football dressing (cast padding X1, stockinet). Right medial ankle- gentian violet, stacy, aquacel foam cut, mextra long X1, Right medial foot- gentian violet, HB transfer, mextra long X1, Right toe third- gentian violet, iodosorb, aquacel foam strip, football dressing (cast padding X2, stockinet).        Follow up in about 1 week (around 8/18/2020) for wound care.     Rubén Davis MD

## 2020-08-13 LAB
BACTERIA SPEC AEROBE CULT: ABNORMAL
BACTERIA SPEC AEROBE CULT: ABNORMAL

## 2020-08-14 DIAGNOSIS — E11.9 TYPE 2 DIABETES MELLITUS WITHOUT COMPLICATION: ICD-10-CM

## 2020-08-15 DIAGNOSIS — Z95.810 AUTOMATIC IMPLANTABLE CARDIOVERTER-DEFIBRILLATOR IN SITU: ICD-10-CM

## 2020-08-15 DIAGNOSIS — I25.5 ISCHEMIC CARDIOMYOPATHY: ICD-10-CM

## 2020-08-15 DIAGNOSIS — I50.42 CHRONIC COMBINED SYSTOLIC AND DIASTOLIC HEART FAILURE: ICD-10-CM

## 2020-08-15 DIAGNOSIS — I46.9 CARDIAC ARREST: ICD-10-CM

## 2020-08-15 DIAGNOSIS — I25.10 CORONARY ARTERY DISEASE INVOLVING NATIVE CORONARY ARTERY OF NATIVE HEART WITHOUT ANGINA PECTORIS: Primary | ICD-10-CM

## 2020-08-16 ENCOUNTER — TELEPHONE (OUTPATIENT)
Dept: FAMILY MEDICINE | Facility: CLINIC | Age: 60
End: 2020-08-16

## 2020-08-16 DIAGNOSIS — E11.621 DIABETIC ULCER OF TOE OF RIGHT FOOT ASSOCIATED WITH TYPE 2 DIABETES MELLITUS, WITH BONE INVOLVEMENT WITHOUT EVIDENCE OF NECROSIS: Primary | ICD-10-CM

## 2020-08-16 DIAGNOSIS — L97.516 DIABETIC ULCER OF TOE OF RIGHT FOOT ASSOCIATED WITH TYPE 2 DIABETES MELLITUS, WITH BONE INVOLVEMENT WITHOUT EVIDENCE OF NECROSIS: Primary | ICD-10-CM

## 2020-08-17 DIAGNOSIS — L97.516 DIABETIC ULCER OF TOE OF RIGHT FOOT ASSOCIATED WITH TYPE 2 DIABETES MELLITUS, WITH BONE INVOLVEMENT WITHOUT EVIDENCE OF NECROSIS: Primary | ICD-10-CM

## 2020-08-17 DIAGNOSIS — E11.621 DIABETIC ULCER OF TOE OF RIGHT FOOT ASSOCIATED WITH TYPE 2 DIABETES MELLITUS, WITH BONE INVOLVEMENT WITHOUT EVIDENCE OF NECROSIS: Primary | ICD-10-CM

## 2020-08-17 RX ORDER — CIPROFLOXACIN 500 MG/1
500 TABLET ORAL EVERY 12 HOURS
Qty: 14 TABLET | Refills: 0 | Status: SHIPPED | OUTPATIENT
Start: 2020-08-17 | End: 2020-08-24

## 2020-08-17 RX ORDER — CARVEDILOL 3.12 MG/1
TABLET ORAL
Qty: 60 TABLET | Refills: 1 | Status: SHIPPED | OUTPATIENT
Start: 2020-08-17 | End: 2020-11-23 | Stop reason: SDUPTHER

## 2020-08-17 NOTE — TELEPHONE ENCOUNTER
----- Message from Ad Berrios sent at 8/17/2020  8:06 AM CDT -----  Regarding: order for an xray of the right knee  Type:  Needs Medical Advice/Symptom-based Call    Who Called: Chloé(sister)    Symptoms (please be specific): knee pain, tenderness, red and swollen    How long has patient had these symptoms:  since Thursday     Would the patient rather a call back or a response via [facility]? Call back     Best Call Back Number: [phone]    Additional Information: Chloé, sister of the patient is requesting an xray of the knee of the patient. She stated that the patient's knee is in pain with some tenderness. She would like a call back to schedule an xray.

## 2020-08-17 NOTE — TELEPHONE ENCOUNTER
I spoke to the pt sister and scheduled him for tomorrow for the xray before his wound care appointment. She was notified about the antibiotics.

## 2020-08-17 NOTE — TELEPHONE ENCOUNTER
I ordered xrays to be done and I sent in cipro due to most recent culture showing an additional bacteria growing    Thanks'  Dr. Davis

## 2020-08-18 ENCOUNTER — HOSPITAL ENCOUNTER (OUTPATIENT)
Dept: WOUND CARE | Facility: HOSPITAL | Age: 60
Discharge: HOME | End: 2020-08-18
Attending: FAMILY MEDICINE
Payer: MEDICAID

## 2020-08-18 ENCOUNTER — TELEPHONE (OUTPATIENT)
Dept: ADMINISTRATIVE | Facility: HOSPITAL | Age: 60
End: 2020-08-18

## 2020-08-18 ENCOUNTER — HOSPITAL ENCOUNTER (OUTPATIENT)
Dept: RADIOLOGY | Facility: HOSPITAL | Age: 60
Discharge: HOME | End: 2020-08-18
Attending: FAMILY MEDICINE
Payer: MEDICAID

## 2020-08-18 VITALS
DIASTOLIC BLOOD PRESSURE: 61 MMHG | TEMPERATURE: 98 F | RESPIRATION RATE: 17 BRPM | SYSTOLIC BLOOD PRESSURE: 129 MMHG | HEART RATE: 73 BPM

## 2020-08-18 DIAGNOSIS — E11.621 DIABETIC ULCER OF TOE OF RIGHT FOOT ASSOCIATED WITH TYPE 2 DIABETES MELLITUS, WITH BONE INVOLVEMENT WITHOUT EVIDENCE OF NECROSIS: ICD-10-CM

## 2020-08-18 DIAGNOSIS — E11.621 DIABETIC ULCER OF TOE OF RIGHT FOOT ASSOCIATED WITH TYPE 2 DIABETES MELLITUS, WITH BONE INVOLVEMENT WITHOUT EVIDENCE OF NECROSIS: Primary | ICD-10-CM

## 2020-08-18 DIAGNOSIS — L97.516 DIABETIC ULCER OF TOE OF RIGHT FOOT ASSOCIATED WITH TYPE 2 DIABETES MELLITUS, WITH BONE INVOLVEMENT WITHOUT EVIDENCE OF NECROSIS: ICD-10-CM

## 2020-08-18 DIAGNOSIS — L97.516 DIABETIC ULCER OF TOE OF RIGHT FOOT ASSOCIATED WITH TYPE 2 DIABETES MELLITUS, WITH BONE INVOLVEMENT WITHOUT EVIDENCE OF NECROSIS: Primary | ICD-10-CM

## 2020-08-18 DIAGNOSIS — L97.529 TYPE 2 DIABETES MELLITUS WITH LEFT DIABETIC FOOT ULCER: ICD-10-CM

## 2020-08-18 DIAGNOSIS — E11.621 TYPE 2 DIABETES MELLITUS WITH LEFT DIABETIC FOOT ULCER: ICD-10-CM

## 2020-08-18 PROCEDURE — 99215 OFFICE O/P EST HI 40 MIN: CPT | Mod: 25 | Performed by: FAMILY MEDICINE

## 2020-08-18 PROCEDURE — 99214 PR OFFICE/OUTPT VISIT, EST, LEVL IV, 30-39 MIN: ICD-10-PCS | Mod: ,,, | Performed by: FAMILY MEDICINE

## 2020-08-18 PROCEDURE — 73630 X-RAY EXAM OF FOOT: CPT | Mod: TC,FY,RT

## 2020-08-18 PROCEDURE — 99214 OFFICE O/P EST MOD 30 MIN: CPT | Mod: ,,, | Performed by: FAMILY MEDICINE

## 2020-08-18 PROCEDURE — 73630 X-RAY EXAM OF FOOT: CPT | Mod: 26,RT,, | Performed by: RADIOLOGY

## 2020-08-18 PROCEDURE — 73630 XR FOOT COMPLETE 3 VIEW RIGHT: ICD-10-PCS | Mod: 26,RT,, | Performed by: RADIOLOGY

## 2020-08-18 NOTE — PROGRESS NOTES
[facility]                Progress Note    Subjective:       Patient ID: Marvin Ray is a 60 y.o. male.    Chief Complaint: Non-healing Wound Follow Up and Diabetic Foot Ulcer    08/18/2020: F/U visit. Wounds stable. Right toe third bone exposed. Culture came back positive. No debridement performed. Discussed elevating as much as possible.      Review of Systems   Constitutional: Negative for activity change, appetite change, chills, diaphoresis and fatigue.   HENT: Negative.    Eyes: Negative.    Respiratory: Negative.    Cardiovascular: Positive for leg swelling.   Gastrointestinal: Negative.    Musculoskeletal: Negative.    Skin: Positive for wound.   Neurological: Positive for numbness. Negative for dizziness.   Psychiatric/Behavioral: Negative.          Objective:        Physical Exam  Vitals signs reviewed.   Constitutional:       Appearance: Normal appearance.   HENT:      Head: Normocephalic and atraumatic.      Right Ear: External ear normal.      Left Ear: External ear normal.      Nose: Nose normal.      Mouth/Throat:      Mouth: Mucous membranes are moist.      Pharynx: Oropharynx is clear.   Eyes:      Extraocular Movements: Extraocular movements intact.      Pupils: Pupils are equal, round, and reactive to light.   Neck:      Musculoskeletal: Normal range of motion and neck supple.   Cardiovascular:      Rate and Rhythm: Normal rate and regular rhythm.   Pulmonary:      Effort: Pulmonary effort is normal. No respiratory distress.      Breath sounds: No wheezing.   Abdominal:      General: There is distension.      Palpations: Abdomen is soft.      Tenderness: There is no abdominal tenderness.   Skin:     General: Skin is warm and dry.      Comments: +bilateral DFU's with significant erythema to RLE   Neurological:      Mental Status: He is alert and oriented to person, place, and time.      Sensory: Sensory deficit present.   Psychiatric:          Mood and Affect: Mood normal.         Behavior: Behavior normal.         Vitals:    08/18/20 1014   BP: 129/61   Pulse: 73   Resp: 17   Temp: 97.7 °F (36.5 °C)       Assessment:           ICD-10-CM ICD-9-CM   1. Diabetic ulcer of toe of right foot associated with type 2 diabetes mellitus, with bone involvement without evidence of necrosis  E11.621 250.80    L97.516 707.15   2. Type 2 diabetes mellitus with left diabetic foot ulcer  E11.621 250.80    L97.529 707.15            Wound 11/02/18 Diabetic Ulcer plantar Foot (Active)   11/02/18     Pre-existing: Yes   Primary Wound Type: Diabetic ulc   Side: Left   Orientation: plantar   Location: Foot   Wound Number (optional):    Ankle-Brachial Index:    Pulses:    Removal Indication and Assessment:    Wound Outcome:    (Retired) Wound Type:    (Retired) Wound Length (cm):    (Retired) Wound Width (cm):    (Retired) Depth (cm):    Wound Description (Comments):    Removal Indications:    Wound Image   08/18/20 1000   Wound WDL ex 08/18/20 1000   Dressing Appearance Intact;Moist drainage 08/18/20 1000   Drainage Amount Small 08/18/20 1000   Drainage Characteristics/Odor Serosanguineous 08/18/20 1000   Appearance Pink;Red 08/18/20 1000   Tissue loss description Partial thickness 08/18/20 1000   Black (%), Wound Tissue Color 0 % 08/18/20 1000   Red (%), Wound Tissue Color 80 % 08/18/20 1000   Yellow (%), Wound Tissue Color 20 % 08/18/20 1000   Periwound Area Scar tissue;Macerated 08/18/20 1000   Wound Edges Irregular 08/18/20 1000   Wound Length (cm) 5.4 cm 08/18/20 1000   Wound Width (cm) 5.8 cm 08/18/20 1000   Wound Depth (cm) 0.2 cm 08/18/20 1000   Wound Volume (cm^3) 6.26 cm^3 08/18/20 1000   Wound Surface Area (cm^2) 31.32 cm^2 08/18/20 1000   Tunneling (depth (cm)/location) 0 08/18/20 1000   Undermining (depth (cm)/location) 0 08/18/20 1000   Care Soap and water;Sterile normal saline;Wound cleanser 08/18/20 1000   Dressing Applied;Collagen;Abd pad 08/18/20 1000    Periwound Care Other (see comments) 08/18/20 1000   Off Loading Football dressing 08/18/20 1000   Dressing Change Due 08/25/20 08/18/20 1000            Wound 05/08/20 1015 Left Foot (Active)   05/08/20 1015    Pre-existing: Yes   Primary Wound Type:    Side: Left   Orientation:    Location: Foot   Wound Number (optional):    Ankle-Brachial Index:    Pulses:    Removal Indication and Assessment:    Wound Outcome:    (Retired) Wound Type:    (Retired) Wound Length (cm):    (Retired) Wound Width (cm):    (Retired) Depth (cm):    Wound Description (Comments):    Removal Indications:    Wound Image   08/18/20 1000   Wound WDL ex 08/18/20 1000   Dressing Appearance Intact;Moist drainage 08/18/20 1000   Drainage Amount Small 08/18/20 1000   Drainage Characteristics/Odor Serosanguineous 08/18/20 1000   Appearance Pink;Red;Slough;White 08/18/20 1000   Tissue loss description Partial thickness 08/18/20 1000   Black (%), Wound Tissue Color 0 % 08/18/20 1000   Red (%), Wound Tissue Color 70 % 08/18/20 1000   Yellow (%), Wound Tissue Color 30 % 08/18/20 1000   Periwound Area Scar tissue;Macerated 08/18/20 1000   Wound Edges Defined 08/18/20 1000   Wound Length (cm) 2.8 cm 08/18/20 1000   Wound Width (cm) 7.5 cm 08/18/20 1000   Wound Depth (cm) 0.1 cm 08/18/20 1000   Wound Volume (cm^3) 2.1 cm^3 08/18/20 1000   Wound Surface Area (cm^2) 21 cm^2 08/18/20 1000   Tunneling (depth (cm)/location) 0 08/18/20 1000   Undermining (depth (cm)/location) 0 08/18/20 1000   Care Soap and water;Sterile normal saline;Wound cleanser 08/18/20 1000   Dressing Applied;Collagen;Abd pad 08/18/20 1000   Periwound Care Other (see comments) 08/18/20 1000   Off Loading Football dressing 08/18/20 1000   Dressing Change Due 08/25/20 08/18/20 1000            Wound 08/11/20 1051 Diabetic Ulcer Right medial Malleolus/Ankle (Active)   08/11/20 1051    Pre-existing: No   Primary Wound Type: Diabetic ulc   Side: Right   Orientation: medial   Location:  Malleolus/Ankle   Wound Number (optional):    Ankle-Brachial Index:    Pulses:    Removal Indication and Assessment:    Wound Outcome:    (Retired) Wound Type:    (Retired) Wound Length (cm):    (Retired) Wound Width (cm):    (Retired) Depth (cm):    Wound Description (Comments):    Removal Indications:    Wound Image   08/18/20 1000   Wound WDL ex 08/18/20 1000   Dressing Appearance Intact;Moist drainage 08/18/20 1000   Drainage Amount Small 08/18/20 1000   Drainage Characteristics/Odor Yellow 08/18/20 1000   Appearance Red;Slough;White 08/18/20 1000   Tissue loss description Partial thickness 08/18/20 1000   Black (%), Wound Tissue Color 0 % 08/18/20 1000   Red (%), Wound Tissue Color 70 % 08/18/20 1000   Yellow (%), Wound Tissue Color 30 % 08/18/20 1000   Periwound Area Redness;Swelling;Warm 08/18/20 1000   Wound Edges Defined 08/18/20 1000   Wound Length (cm) 2 cm 08/18/20 1000   Wound Width (cm) 2.5 cm 08/18/20 1000   Wound Depth (cm) 0.3 cm 08/18/20 1000   Wound Volume (cm^3) 1.5 cm^3 08/18/20 1000   Wound Surface Area (cm^2) 5 cm^2 08/18/20 1000   Tunneling (depth (cm)/location) 0 08/18/20 1000   Undermining (depth (cm)/location) 0 08/18/20 1000   Care Soap and water;Sterile normal saline;Wound cleanser 08/18/20 1000   Dressing Changed;Collagen;Other (see comments) 08/18/20 1000   Periwound Care Other (see comments) 08/18/20 1000   Off Loading Football dressing 08/18/20 1000   Dressing Change Due 08/25/20 08/18/20 1000            Wound 08/11/20 1052 Diabetic Ulcer Right medial Foot (Active)   08/11/20 1052    Pre-existing: No   Primary Wound Type: Diabetic ulc   Side: Right   Orientation: medial   Location: Foot   Wound Number (optional):    Ankle-Brachial Index:    Pulses:    Removal Indication and Assessment:    Wound Outcome:    (Retired) Wound Type:    (Retired) Wound Length (cm):    (Retired) Wound Width (cm):    (Retired) Depth (cm):    Wound Description (Comments):    Removal Indications:    Wound  Image   08/18/20 1000   Wound WDL ex 08/18/20 1000   Dressing Appearance Intact;Moist drainage 08/18/20 1000   Drainage Amount Moderate 08/18/20 1000   Drainage Characteristics/Odor Serosanguineous 08/18/20 1000   Appearance Pink;Red;White 08/18/20 1000   Tissue loss description Partial thickness 08/18/20 1000   Black (%), Wound Tissue Color 0 % 08/18/20 1000   Red (%), Wound Tissue Color 100 % 08/18/20 1000   Yellow (%), Wound Tissue Color 0 % 08/18/20 1000   Periwound Area Macerated;Redness;Swelling;Warm 08/18/20 1000   Wound Edges Irregular 08/18/20 1000   Wound Length (cm) 4 cm 08/18/20 1000   Wound Width (cm) 4 cm 08/18/20 1000   Wound Depth (cm) 0.1 cm 08/18/20 1000   Wound Volume (cm^3) 1.6 cm^3 08/18/20 1000   Wound Surface Area (cm^2) 16 cm^2 08/18/20 1000   Tunneling (depth (cm)/location) 0 08/18/20 1000   Undermining (depth (cm)/location) 0 08/18/20 1000   Care Soap and water;Sterile normal saline;Wound cleanser 08/18/20 1000   Dressing Applied;Other (see comments) 08/18/20 1000   Periwound Care Other (see comments) 08/18/20 1000   Off Loading Football dressing 08/18/20 1000   Dressing Change Due 08/25/20 08/18/20 1000            Incision/Site 06/16/20 0930 Right Toe, third anterior (Active)   06/16/20 0930   Present Prior to Hospital Arrival?: Yes   Side: Right   Location: Toe, third   Orientation: anterior   Incision Type:    Closure Method:    Additional Comments:    Removal Indication and Assessment:    Wound Outcome:    Removal Indications:    Wound Image   08/18/20 1000   Incision WDL ex 08/18/20 1000   Dressing Appearance Intact;Moist drainage 08/18/20 1000   Drainage Amount Small 08/18/20 1000   Drainage Characteristics/Odor Yellow;Serosanguineous 08/18/20 1000   Appearance Pink;Red;Bone 08/18/20 1000   Black (%), Wound Tissue Color 0 % 08/18/20 1000   Red (%), Wound Tissue Color 100 % 08/18/20 1000   Yellow (%), Wound Tissue Color 0 % 08/18/20 1000   Periwound Area Redness;Swelling;Warm 08/18/20  1000   Wound Edges Defined 08/18/20 1000   Wound Length (cm) 0.2 cm 08/18/20 1000   Wound Width (cm) 0.3 cm 08/18/20 1000   Wound Depth (cm) 0.2 cm 08/18/20 1000   Wound Volume (cm^3) 0.01 cm^3 08/18/20 1000   Wound Surface Area (cm^2) 0.06 cm^2 08/18/20 1000   Tunneling (depth (cm)/location) 0 08/18/20 1000   Undermining (depth (cm)/location) 0 08/18/20 1000   Care Sterile normal saline;Soap and water;Wound cleanser 08/18/20 1000   Dressing Applied;Other (see comments);Foam 08/18/20 1000   Periwound Care Other (see comments) 08/18/20 1000   Off Loading Football dressing 08/18/20 1000   Dressing Change Due 08/25/20 08/18/20 1000           Plan:                Orders Placed This Encounter   Procedures    Change dressing     Left foot- gentian violet, endoform, ABD pad X2, football dressing (cast padding X2, stockinet). Gentian violet to all right foot wounds. Right toe third- iodosorb, aquacel foam, right medial foot- drawtex, mextra long, right malleolus- promogran, iodosorb, mextra long, football dressing (cast padding X2, stockinet)        Follow up in about 1 week (around 8/25/2020) for wound care.     Rubén Davis MD

## 2020-08-20 ENCOUNTER — HOSPITAL ENCOUNTER (OUTPATIENT)
Facility: HOSPITAL | Age: 60
Discharge: HOME | End: 2020-08-20
Attending: RADIOLOGY | Admitting: RADIOLOGY
Payer: MEDICAID

## 2020-08-20 VITALS — OXYGEN SATURATION: 100 % | DIASTOLIC BLOOD PRESSURE: 69 MMHG | SYSTOLIC BLOOD PRESSURE: 132 MMHG

## 2020-08-20 DIAGNOSIS — R18.8 OTHER ASCITES: ICD-10-CM

## 2020-08-20 NOTE — DISCHARGE SUMMARY
Radiology Discharge Summary      Hospital Course: No complications    Admit Date: 8/20/2020  Discharge Date: 08/20/2020     Instructions Given to Patient: Yes    Diet: Resume prior diet     Activity: activity as tolerated    Description of Condition on Discharge: Stable    Vital Signs (Most Recent): BP: 132/69 (08/20/20 1410)  SpO2: 100 % (08/20/20 1410)    Discharge Disposition: Home    Discharge Diagnosis:   58 y/o M with h/o recurrent painful, tense ascites s/p successful U/S-guided therapeutic LVP with local anesthetic only and albumin infusion post PRN as indicated per institutional protocol. Patient tolerated the procedure well. No immediate post-procedural complications noted.      Thank you for considering IR for the care of your patient.      Zach Cha MD  Interventional Radiology

## 2020-08-20 NOTE — BRIEF OP NOTE
Radiology Post-Procedure Note     Pre Op Diagnosis: Recurrent painful, tense ascites  Post Op Diagnosis: Same     Procedure: U/S-guided therapeutic LVP     Procedure performed by: Zach Cha MD     Written Informed Consent Obtained: Yes  Specimen Removed: YES, 4.8-L of thin, straw-colored ascitic fluid  Estimated Blood Loss: none     Findings:   1. Successful therapeutic large-volume paracentesis with local anesthetic only and albumin infusion post PRN as indicated per institutional protocol. Patient tolerated the procedure well. No immediate post-procedural complications noted.       Thank you for considering IR for the care of your patient.      Zach Cha MD  Interventional Radiology

## 2020-08-24 ENCOUNTER — OFFICE VISIT (OUTPATIENT)
Dept: INFECTIOUS DISEASES | Facility: CLINIC | Age: 60
End: 2020-08-24
Payer: MEDICAID

## 2020-08-24 VITALS
BODY MASS INDEX: 29.32 KG/M2 | WEIGHT: 204.81 LBS | HEART RATE: 74 BPM | TEMPERATURE: 98 F | SYSTOLIC BLOOD PRESSURE: 122 MMHG | HEIGHT: 70 IN | DIASTOLIC BLOOD PRESSURE: 68 MMHG

## 2020-08-24 DIAGNOSIS — L97.516 DIABETIC ULCER OF TOE OF RIGHT FOOT ASSOCIATED WITH TYPE 2 DIABETES MELLITUS, WITH BONE INVOLVEMENT WITHOUT EVIDENCE OF NECROSIS: ICD-10-CM

## 2020-08-24 DIAGNOSIS — E11.621 DIABETIC ULCER OF TOE OF RIGHT FOOT ASSOCIATED WITH TYPE 2 DIABETES MELLITUS, WITH BONE INVOLVEMENT WITHOUT EVIDENCE OF NECROSIS: ICD-10-CM

## 2020-08-24 PROCEDURE — 99214 OFFICE O/P EST MOD 30 MIN: CPT | Mod: S$PBB,,, | Performed by: INTERNAL MEDICINE

## 2020-08-24 PROCEDURE — 99999 PR PBB SHADOW E&M-EST. PATIENT-LVL IV: CPT | Mod: PBBFAC,,, | Performed by: INTERNAL MEDICINE

## 2020-08-24 PROCEDURE — 99214 PR OFFICE/OUTPT VISIT, EST, LEVL IV, 30-39 MIN: ICD-10-PCS | Mod: S$PBB,,, | Performed by: INTERNAL MEDICINE

## 2020-08-24 PROCEDURE — 99214 OFFICE O/P EST MOD 30 MIN: CPT | Mod: PBBFAC | Performed by: INTERNAL MEDICINE

## 2020-08-24 PROCEDURE — 99999 PR PBB SHADOW E&M-EST. PATIENT-LVL IV: ICD-10-PCS | Mod: PBBFAC,,, | Performed by: INTERNAL MEDICINE

## 2020-08-24 RX ORDER — CIPROFLOXACIN 500 MG/1
500 TABLET ORAL EVERY 12 HOURS
Qty: 20 TABLET | Refills: 0 | Status: SHIPPED | OUTPATIENT
Start: 2020-08-24 | End: 2020-09-03

## 2020-08-24 RX ORDER — CEFADROXIL 1000 MG/1
1 TABLET ORAL 2 TIMES DAILY
Qty: 20 TABLET | Refills: 0 | Status: SHIPPED | OUTPATIENT
Start: 2020-08-24 | End: 2020-09-03

## 2020-08-24 NOTE — PROGRESS NOTES
Subjective:      Patient ID: Marvin Ray is a 60 y.o. male.    Chief Complaint:Follow-up      History of Present Illness    61 y/o with a history of DM type 2, CAD with CHF s/p AICD placement.  He also has a history of diabetic foot ulcer and osteomyelitis.  He was treated by myself earlier this year.  He is referred to me for evaluation of a foot ulcer.  Cultures were positive for pseudomonas and group b strep.    Review of Systems   Constitution: Negative for chills, decreased appetite, fever, malaise/fatigue, night sweats, weight gain and weight loss.   HENT: Negative for congestion, ear pain, hearing loss, hoarse voice, sore throat and tinnitus.    Eyes: Negative for blurred vision, redness and visual disturbance.   Cardiovascular: Positive for leg swelling. Negative for chest pain and palpitations.   Respiratory: Negative for cough, hemoptysis, shortness of breath and sputum production.    Hematologic/Lymphatic: Negative for adenopathy. Does not bruise/bleed easily.   Skin: Positive for itching. Negative for dry skin, rash and suspicious lesions.   Musculoskeletal: Negative for back pain, joint pain, myalgias and neck pain.   Gastrointestinal: Negative for abdominal pain, constipation, diarrhea, heartburn, nausea and vomiting.   Genitourinary: Negative for dysuria, flank pain, frequency, hematuria, hesitancy and urgency.   Neurological: Negative for dizziness, headaches, numbness, paresthesias and weakness.   Psychiatric/Behavioral: Negative for depression and memory loss. The patient does not have insomnia and is not nervous/anxious.      Objective:   Physical Exam  Assessment:       1. Diabetic ulcer of toe of right foot associated with type 2 diabetes mellitus, with bone involvement without evidence of necrosis        Cultures were positive for pseudomonas and group b strep.  Will manage with ciprofloxacin and cefadroxil.  I will check labs on him.  He will follow up with me in 2-3 weeks.  Plan:        Diabetic ulcer of toe of right foot associated with type 2 diabetes mellitus, with bone involvement without evidence of necrosis  -     Ambulatory referral/consult to Infectious Disease  -     ciprofloxacin HCl (CIPRO) 500 MG tablet; Take 1 tablet (500 mg total) by mouth every 12 (twelve) hours. for 10 days  Dispense: 20 tablet; Refill: 0  -     cefadroxil (DURICEF) 1 gram tablet; Take 1 tablet (1 g total) by mouth 2 (two) times daily. for 10 days  Dispense: 20 tablet; Refill: 0  -     Comprehensive metabolic panel; Future; Expected date: 08/24/2020  -     CBC auto differential; Future; Expected date: 08/24/2020  -     C-reactive protein; Future; Expected date: 08/24/2020

## 2020-08-24 NOTE — LETTER
August 30, 2020      Rubén Davis MD  [address]  [place] [address]           [facility] - Infectious Disease 1st Fl  [address]  [place] [address]  Phone: [phone]  Fax: [phone]          Patient: Marvin Ray   MR Number: [id number]   YOB: 1960   Date of Visit: 8/24/2020       Dear Dr. Rubén ANDRADE Page:    Thank you for referring Marvin Ray to me for evaluation. Attached you will find relevant portions of my assessment and plan of care.    If you have questions, please do not hesitate to call me. I look forward to following Marvin Ray along with you.    Sincerely,    Dixon West MD    Enclosure  CC:  No Recipients    If you would like to receive this communication electronically, please contact [email or web address] or [phone] to request more information on [facility] access.    For providers and/or their staff who would like to refer a patient to [facility], please contact us through our one-stop-shop provider referral line, [facility], at [phone].    If you feel you have received this communication in error or would no longer like to receive these types of communications, please e-mail [email or web address]

## 2020-08-25 ENCOUNTER — TELEPHONE (OUTPATIENT)
Dept: INFECTIOUS DISEASES | Facility: CLINIC | Age: 60
End: 2020-08-25

## 2020-08-25 ENCOUNTER — HOSPITAL ENCOUNTER (OUTPATIENT)
Dept: WOUND CARE | Facility: HOSPITAL | Age: 60
Discharge: HOME | End: 2020-08-25
Attending: FAMILY MEDICINE
Payer: MEDICAID

## 2020-08-25 VITALS
RESPIRATION RATE: 17 BRPM | DIASTOLIC BLOOD PRESSURE: 71 MMHG | TEMPERATURE: 97 F | HEART RATE: 83 BPM | SYSTOLIC BLOOD PRESSURE: 129 MMHG

## 2020-08-25 DIAGNOSIS — S91.302D OPEN WOUND OF FOOT, LEFT, SUBSEQUENT ENCOUNTER: Primary | ICD-10-CM

## 2020-08-25 DIAGNOSIS — L97.516 DIABETIC ULCER OF TOE OF RIGHT FOOT ASSOCIATED WITH TYPE 2 DIABETES MELLITUS, WITH BONE INVOLVEMENT WITHOUT EVIDENCE OF NECROSIS: ICD-10-CM

## 2020-08-25 DIAGNOSIS — E11.621 DIABETIC ULCER OF TOE OF RIGHT FOOT ASSOCIATED WITH TYPE 2 DIABETES MELLITUS, WITH BONE INVOLVEMENT WITHOUT EVIDENCE OF NECROSIS: ICD-10-CM

## 2020-08-25 PROCEDURE — 27201912 HC WOUND CARE DEBRIDEMENT SUPPLIES: Performed by: FAMILY MEDICINE

## 2020-08-25 PROCEDURE — 11042 DBRDMT SUBQ TIS 1ST 20SQCM/<: CPT | Performed by: FAMILY MEDICINE

## 2020-08-25 PROCEDURE — 99214 OFFICE O/P EST MOD 30 MIN: CPT | Mod: 25,,, | Performed by: FAMILY MEDICINE

## 2020-08-25 PROCEDURE — 11042 DEBRIDEMENT: ICD-10-PCS | Mod: ,,, | Performed by: FAMILY MEDICINE

## 2020-08-25 PROCEDURE — 11042 DBRDMT SUBQ TIS 1ST 20SQCM/<: CPT | Mod: ,,, | Performed by: FAMILY MEDICINE

## 2020-08-25 PROCEDURE — 99214 PR OFFICE/OUTPT VISIT, EST, LEVL IV, 30-39 MIN: ICD-10-PCS | Mod: 25,,, | Performed by: FAMILY MEDICINE

## 2020-08-25 NOTE — TELEPHONE ENCOUNTER
----- Message from Gustavo Donovan sent at 8/25/2020 12:32 PM CDT -----  Regarding: Prescription Inquiry  Pt called inquiring on prescription that was prescribed on yesterday by Physician     [phone] or [phone]

## 2020-08-25 NOTE — TELEPHONE ENCOUNTER
----- Message from Dixon West MD sent at 8/25/2020  1:02 PM CDT -----  His blood sugar is high.  But otherwise the blood work looks okay.  Please notify him.

## 2020-08-25 NOTE — PROGRESS NOTES
[facility]                Progress Note    Subjective:       Patient ID: aMrvin Ray is a 60 y.o. male.    Chief Complaint: Non-healing Wound Follow Up and Diabetic Foot Ulcer    08/25/2020: F/U visit. Patient recently seen by ID and started on abx.  No pain in wounds.  No increased drainage.  No fevers, chills, or sweats.  no odor from wound.  He reports taking abx as prescribed.       Review of Systems   Constitutional: Negative.    HENT: Negative.    Respiratory: Negative.    Cardiovascular: Negative.    Gastrointestinal: Negative.    Skin: Positive for wound.   Neurological: Negative.    Psychiatric/Behavioral: Negative.          Objective:        Physical Exam  Vitals signs reviewed.   Constitutional:       Appearance: Normal appearance.   HENT:      Head: Normocephalic.      Right Ear: External ear normal.      Left Ear: External ear normal.      Nose: Nose normal.      Mouth/Throat:      Mouth: Mucous membranes are moist.      Pharynx: Oropharynx is clear.   Eyes:      Extraocular Movements: Extraocular movements intact.      Pupils: Pupils are equal, round, and reactive to light.   Neck:      Musculoskeletal: Normal range of motion and neck supple.   Cardiovascular:      Rate and Rhythm: Normal rate and regular rhythm.   Pulmonary:      Effort: Pulmonary effort is normal. No respiratory distress.      Breath sounds: No wheezing.   Abdominal:      General: There is no distension.      Palpations: Abdomen is soft.      Tenderness: There is no abdominal tenderness.   Skin:     General: Skin is warm and dry.      Comments: +multiple bilateral DFUs with excess slough; Right foot DFUs requiring debridement due to slough   Neurological:      Mental Status: He is alert and oriented to person, place, and time.   Psychiatric:         Mood and Affect: Mood normal.           Vitals:    08/25/20 1024   BP: 129/71   Pulse: 83   Resp: 17   Temp: 97.3 °F (36.3 °C)  "    Debridement    Date/Time: 8/25/2020 1:42 PM  Performed by: Rubén Davis MD  Authorized by: Rubén Davis MD     Time out: Immediately prior to procedure a "time out" was called to verify the correct patient, procedure, equipment, support staff and site/side marked as required.    Consent Done?:  Yes (Written)    Preparation: Patient was prepped and draped in usual sterile fashion    Local anesthetic:  Topical anesthetic  Anesthetic total (ml):  10    Wound Details:    Location:  Right foot    Location:  Right Ankle    Type of Debridement:  Excisional       Length (cm):  2.2       Area (sq cm):  5.5       Width (cm):  2.5       Percent Debrided (%):  100       Depth (cm):  0.2       Total Area Debrided (sq cm):  5.5    Depth of debridement:  Subcutaneous tissue    Tissue debrided:  Dermis, Epidermis and Subcutaneous    Devitalized tissue debrided:  Biofilm, Fibrin and Slough    Instruments:  Blade    Additional wounds:  1    2nd Wound Details:     Location:  Right foot    Location:  Right Midfoot    Location:  Right Midfoot    Type of Debridement:  Excisional       Length (cm):  2.4       Area (sq cm):  3.6       Width (cm):  1.5       Percent Debrided (%):  100       Depth (cm):  0.1       Total Area Debrided (sq cm):  3.6    Depth of debridement:  Subcutaneous tissue    Tissue debrided:  Subcutaneous    Devitalized tissue debrided:  Biofilm, Fibrin and Slough    Instruments:  Curette    Bleeding:  Minimal  Hemostasis Achieved: Yes    Method Used:  Pressure  Patient tolerance:  Patient tolerated the procedure well with no immediate complications      Assessment:           ICD-10-CM ICD-9-CM   1. Open wound of foot, left, subsequent encounter  S91.302D V58.89     892.0   2. Diabetic ulcer of toe of right foot associated with type 2 diabetes mellitus, with bone involvement without evidence of necrosis  E11.621 250.80    L97.516 707.15            Wound 11/02/18 Diabetic Ulcer plantar Foot (Active)   11/02/18 "     Pre-existing: Yes   Primary Wound Type: Diabetic ulc   Side: Left   Orientation: plantar   Location: Foot   Wound Number (optional):    Ankle-Brachial Index:    Pulses:    Removal Indication and Assessment:    Wound Outcome:    (Retired) Wound Type:    (Retired) Wound Length (cm):    (Retired) Wound Width (cm):    (Retired) Depth (cm):    Wound Description (Comments):    Removal Indications:    Wound Image   08/25/20 1000   Wound WDL ex 08/25/20 1000   Dressing Appearance Intact;Moist drainage 08/25/20 1000   Drainage Amount Moderate 08/25/20 1000   Drainage Characteristics/Odor Yellow 08/25/20 1000   Appearance Pink;Red;White;Slough 08/25/20 1000   Tissue loss description Partial thickness 08/25/20 1000   Black (%), Wound Tissue Color 0 % 08/25/20 1000   Red (%), Wound Tissue Color 70 % 08/25/20 1000   Yellow (%), Wound Tissue Color 30 % 08/25/20 1000   Periwound Area Macerated;Scar tissue 08/25/20 1000   Wound Edges Irregular 08/25/20 1000   Wound Length (cm) 5 cm 08/25/20 1000   Wound Width (cm) 5.2 cm 08/25/20 1000   Wound Depth (cm) 0.1 cm 08/25/20 1000   Wound Volume (cm^3) 2.6 cm^3 08/25/20 1000   Wound Surface Area (cm^2) 26 cm^2 08/25/20 1000   Tunneling (depth (cm)/location) 0 08/25/20 1000   Undermining (depth (cm)/location) 0 08/25/20 1000   Care Soap and water;Sterile normal saline;Wound cleanser 08/25/20 1000   Dressing Applied;Collagen;Abd pad 08/25/20 1000   Periwound Care Other (see comments) 08/25/20 1000   Off Loading Football dressing 08/25/20 1000   Dressing Change Due 09/01/20 08/25/20 1000            Wound 05/08/20 1015 Left Foot (Active)   05/08/20 1015    Pre-existing: Yes   Primary Wound Type:    Side: Left   Orientation:    Location: Foot   Wound Number (optional):    Ankle-Brachial Index:    Pulses:    Removal Indication and Assessment:    Wound Outcome:    (Retired) Wound Type:    (Retired) Wound Length (cm):    (Retired) Wound Width (cm):    (Retired) Depth (cm):    Wound  Description (Comments):    Removal Indications:    Wound Image   08/25/20 1000   Wound WDL ex 08/25/20 1000   Dressing Appearance Intact;Moist drainage 08/25/20 1000   Drainage Amount Small 08/25/20 1000   Drainage Characteristics/Odor Yellow 08/25/20 1000   Appearance Pink;Red;Slough;White 08/25/20 1000   Tissue loss description Partial thickness 08/25/20 1000   Black (%), Wound Tissue Color 0 % 08/25/20 1000   Red (%), Wound Tissue Color 60 % 08/25/20 1000   Yellow (%), Wound Tissue Color 40 % 08/25/20 1000   Periwound Area Macerated;Scar tissue 08/25/20 1000   Wound Edges Defined 08/25/20 1000   Wound Length (cm) 2.1 cm 08/25/20 1000   Wound Width (cm) 5.5 cm 08/25/20 1000   Wound Depth (cm) 0.1 cm 08/25/20 1000   Wound Volume (cm^3) 1.16 cm^3 08/25/20 1000   Wound Surface Area (cm^2) 11.55 cm^2 08/25/20 1000   Tunneling (depth (cm)/location) 0 08/25/20 1000   Undermining (depth (cm)/location) 0 08/25/20 1000   Care Soap and water;Sterile normal saline;Wound cleanser 08/25/20 1000   Dressing Applied;Collagen;Abd pad 08/25/20 1000   Periwound Care Other (see comments) 08/25/20 1000   Off Loading Football dressing 08/25/20 1000   Dressing Change Due 09/01/20 08/25/20 1000            Wound 08/11/20 1051 Diabetic Ulcer Right medial Malleolus/Ankle (Active)   08/11/20 1051    Pre-existing: No   Primary Wound Type: Diabetic ulc   Side: Right   Orientation: medial   Location: Malleolus/Ankle   Wound Number (optional):    Ankle-Brachial Index:    Pulses:    Removal Indication and Assessment:    Wound Outcome:    (Retired) Wound Type:    (Retired) Wound Length (cm):    (Retired) Wound Width (cm):    (Retired) Depth (cm):    Wound Description (Comments):    Removal Indications:    Wound Image   08/25/20 1000   Wound WDL ex 08/25/20 1000   Dressing Appearance Intact;Moist drainage 08/25/20 1000   Drainage Amount Small 08/25/20 1000   Drainage Characteristics/Odor Yellow 08/25/20 1000   Appearance Pink;Slough 08/25/20 1000    Tissue loss description Partial thickness 08/25/20 1000   Black (%), Wound Tissue Color 0 % 08/25/20 1000   Red (%), Wound Tissue Color 30 % 08/25/20 1000   Yellow (%), Wound Tissue Color 70 % 08/25/20 1000   Periwound Area Redness;Macerated 08/25/20 1000   Wound Edges Defined 08/25/20 1000   Wound Length (cm) 2.2 cm 08/25/20 1000   Wound Width (cm) 2.5 cm 08/25/20 1000   Wound Depth (cm) 0.2 cm 08/25/20 1000   Wound Volume (cm^3) 1.1 cm^3 08/25/20 1000   Wound Surface Area (cm^2) 5.5 cm^2 08/25/20 1000   Tunneling (depth (cm)/location) 0 08/25/20 1000   Undermining (depth (cm)/location) 0 08/25/20 1000   Care Soap and water;Sterile normal saline;Wound cleanser 08/25/20 1000   Dressing Applied;Collagen;Foam 08/25/20 1000   Off Loading Football dressing 08/25/20 1000   Dressing Change Due 09/01/20 08/25/20 1000            Wound 08/11/20 1052 Diabetic Ulcer Right medial Foot (Active)   08/11/20 1052    Pre-existing: No   Primary Wound Type: Diabetic ulc   Side: Right   Orientation: medial   Location: Foot   Wound Number (optional):    Ankle-Brachial Index:    Pulses:    Removal Indication and Assessment:    Wound Outcome:    (Retired) Wound Type:    (Retired) Wound Length (cm):    (Retired) Wound Width (cm):    (Retired) Depth (cm):    Wound Description (Comments):    Removal Indications:    Wound Image   08/25/20 1000   Wound WDL ex 08/25/20 1000   Dressing Appearance Intact;Moist drainage 08/25/20 1000   Drainage Amount Moderate 08/25/20 1000   Drainage Characteristics/Odor Yellow 08/25/20 1000   Appearance Slough;White 08/25/20 1000   Tissue loss description Partial thickness 08/25/20 1000   Black (%), Wound Tissue Color 0 % 08/25/20 1000   Red (%), Wound Tissue Color 0 % 08/25/20 1000   Yellow (%), Wound Tissue Color 100 % 08/25/20 1000   Periwound Area Macerated 08/25/20 1000   Wound Edges Irregular 08/25/20 1000   Wound Length (cm) 2.4 cm 08/25/20 1000   Wound Width (cm) 1.5 cm 08/25/20 1000   Wound Depth (cm)  0.1 cm 08/25/20 1000   Wound Volume (cm^3) 0.36 cm^3 08/25/20 1000   Wound Surface Area (cm^2) 3.6 cm^2 08/25/20 1000   Tunneling (depth (cm)/location) 0 08/25/20 1000   Undermining (depth (cm)/location) 0 08/25/20 1000   Care Soap and water;Sterile normal saline;Wound cleanser 08/25/20 1000   Dressing Applied;Other (see comments) 08/25/20 1000   Periwound Care Other (see comments) 08/25/20 1000   Off Loading Football dressing 08/25/20 1000   Dressing Change Due 09/01/20 08/25/20 1000            Incision/Site 06/16/20 0930 Right Toe, third anterior (Active)   06/16/20 0930   Present Prior to Hospital Arrival?: Yes   Side: Right   Location: Toe, third   Orientation: anterior   Incision Type:    Closure Method:    Additional Comments:    Removal Indication and Assessment:    Wound Outcome:    Removal Indications:    Wound Image   08/25/20 1000   Incision WDL WDL 08/25/20 1000   Dressing Appearance Intact;Moist drainage 08/25/20 1000   Drainage Amount Small 08/25/20 1000   Drainage Characteristics/Odor Yellow 08/25/20 1000   Appearance Pink;Red;Bone;White 08/25/20 1000   Black (%), Wound Tissue Color 0 % 08/25/20 1000   Red (%), Wound Tissue Color 100 % 08/25/20 1000   Yellow (%), Wound Tissue Color 0 % 08/25/20 1000   Periwound Area Swelling;Redness 08/25/20 1000   Wound Edges Defined 08/25/20 1000   Wound Length (cm) 0.2 cm 08/25/20 1000   Wound Width (cm) 0.3 cm 08/25/20 1000   Wound Depth (cm) 0.3 cm 08/25/20 1000   Wound Volume (cm^3) 0.02 cm^3 08/25/20 1000   Wound Surface Area (cm^2) 0.06 cm^2 08/25/20 1000   Tunneling (depth (cm)/location) 0 08/25/20 1000   Undermining (depth (cm)/location) 0 08/25/20 1000   Care Soap and water;Sterile normal saline;Wound cleanser 08/25/20 1000   Dressing Applied;Other (see comments);Foam 08/25/20 1000   Off Loading Football dressing 08/25/20 1000   Dressing Change Due 09/01/20 08/25/20 1000           Plan:                Orders Placed This Encounter   Procedures     Debridement     This order was created via procedure documentation     Standing Status:   Standing     Number of Occurrences:   1    Change dressing     Left foot/ plantar- gentian violet, endoform, ABD pad X2, football dressing (cast padding X2, stockinet). Right toe third- iodosorb, demian short, right malleolus- endoform, demian foam, right medial foot- gentian violet, santyl, football dressing (cast padding X2, stockinet).        Follow up in about 1 week (around 9/1/2020) for wound care.     Rubén Davis MD

## 2020-08-27 ENCOUNTER — HOSPITAL ENCOUNTER (OUTPATIENT)
Dept: INTERVENTIONAL RADIOLOGY/VASCULAR | Facility: HOSPITAL | Age: 60
Discharge: HOME | End: 2020-08-27
Attending: FAMILY MEDICINE
Payer: MEDICAID

## 2020-08-27 VITALS — DIASTOLIC BLOOD PRESSURE: 73 MMHG | SYSTOLIC BLOOD PRESSURE: 139 MMHG

## 2020-08-27 DIAGNOSIS — R18.8 OTHER ASCITES: ICD-10-CM

## 2020-08-27 PROCEDURE — 49083 ABD PARACENTESIS W/IMAGING: CPT

## 2020-08-27 PROCEDURE — 49083 ABD PARACENTESIS W/IMAGING: CPT | Mod: ,,, | Performed by: RADIOLOGY

## 2020-08-27 PROCEDURE — A7048 VACUUM DRAIN BOTTLE/TUBE KIT: HCPCS

## 2020-08-27 PROCEDURE — 49083 IR PARACENTESIS NO IMAGING: ICD-10-PCS | Mod: ,,, | Performed by: RADIOLOGY

## 2020-08-27 NOTE — DISCHARGE SUMMARY
Radiology Discharge Summary      Hospital Course: No complications    Admit Date: 8/27/2020  Discharge Date: 08/27/2020     Instructions Given to Patient: Yes    Diet: Resume prior diet     Activity: activity as tolerated    Description of Condition on Discharge: Stable    Vital Signs (Most Recent): BP: 139/73 (08/27/20 1000)    Discharge Disposition: Home    Discharge Diagnosis:   60 y/o M with h/o recurrent painful, tense ascites s/p successful U/S-guided therapeutic LVP with local anesthetic only and albumin infusion post PRN as indicated per institutional protocol. Patient tolerated the procedure well. No immediate post-procedural complications noted.      Thank you for considering IR for the care of your patient.      Zach Cha MD  Interventional Radiology

## 2020-08-27 NOTE — BRIEF OP NOTE
Radiology Post-Procedure Note     Pre Op Diagnosis: Recurrent painful, tense ascites  Post Op Diagnosis: Same     Procedure: U/S-guided therapeutic LVP     Procedure performed by: Zach Cha MD     Written Informed Consent Obtained: Yes  Specimen Removed: YES, 4350-cc of thin, straw-colored ascitic fluid  Estimated Blood Loss: none     Findings:   1. Successful therapeutic large-volume paracentesis with local anesthetic only and albumin infusion post PRN as indicated per institutional protocol. Patient tolerated the procedure well. No immediate post-procedural complications noted.       Thank you for considering IR for the care of your patient.      Zach Cha MD  Interventional Radiology

## 2020-08-27 NOTE — H&P
Radiology History & Physical      SUBJECTIVE:     Chief Complaint: Recurrent painful, tense ascites     History of Present Illness:  Marvin Ray is a 58 y.o. male with PMHx of CKD III and combined systolic/diastolic CHF complicated by recurrent abdominal distension and pain 2/2 recurrent painful, tense ascites without TTP on PE to suggest SBP requiring frequent  therapeutic LVP.      A new outpatient IR consult placed for US-guided therapeutic large-volume paracentesis.      Past Medical History:   Diagnosis Date    Arthritis     Cellulitis     CKD (chronic kidney disease), stage III     Coronary artery disease     Diabetes mellitus     Diabetic retinopathy     Diabetic ulcer of left foot     Glaucoma     Gout     Hyperlipemia     Hypertension     ICD (implantable cardioverter-defibrillator) in place 11/02/2018    Left chest    Non-pressure chronic ulcer of other part of left foot with fat layer exposed 10/23/2018    PVD (peripheral vascular disease)     Type 2 diabetes mellitus with left diabetic foot ulcer 10/29/2018    Unsteady gait     uses a wheelchair     Past Surgical History:   Procedure Laterality Date    AHMED GLAUCOMA IMPLANT Left 2011    DONE AT [facility]    AORTOGRAPHY WITH SERIALOGRAPHY Left 4/30/2019    Procedure: AORTOGRAM, WITH SERIALOGRAPHY, LEFT LOWER EXTREMITY, POSSIBLE INTERVENTION;  Surgeon: Mitch Chan MD;  Location: [facility];  Service: Vascular;  Laterality: Left;  RN PRE OP 4-23-19.  NO H & P, No Cosent  [place]    BAERVELDT GLAUCOMA IMPLANT Left 2012    WITH CATARACT EXTRACTION//DONE AT [facility]    CARDIAC CATHETERIZATION Left 05/2016    CARDIAC DEFIBRILLATOR PLACEMENT Left 11/02/2018    CATARACT EXTRACTION W/  INTRAOCULAR LENS IMPLANT Left 2012    WITH BAERVEDT//DONE AT [facility]    CATARACT EXTRACTION W/  INTRAOCULAR LENS IMPLANT Right 09/26/2018    COMPLEX ()    CB DESTRUCTION WITH CYCLO G6 Left 02/15/2017        CYST REMOVAL       DEBRIDEMENT OF FOOT  12/28/2018    Procedure: DEBRIDEMENT, FOOT;  Surgeon: Mitch Wall MD;  Location: [facility];  Service: Vascular;;    DEBRIDEMENT OF FOOT  6/5/2019    Procedure: DEBRIDEMENT, FOOT;  Surgeon: Mitch Wall MD;  Location: [facility];  Service: Vascular;;    EXAMINATION UNDER ANESTHESIA Left 12/5/2018    Procedure: Exam under anesthesia, left foot debridement, washout and all other indicated procedures;  Surgeon: Mitch Wall MD;  Location: [facility];  Service: Vascular;  Laterality: Left;  1030AM START  RN PREOP 12/3/2018-----AICD  SEEN BY DR JAMES 12/4    EXAMINATION UNDER ANESTHESIA Left 2/13/2019    Procedure: LEFT FOOT WOUND DEBRIDEMENT, WASHOUT AND ALL OTHER INDICATED PROCEDURES;  Surgeon: Mitch Wall MD;  Location: [facility];  Service: Vascular;  Laterality: Left;  requesting 1st case start  THIS CASE 1ST PER DR WALL ON 2/1/19 @ 844AM -[place]  RN PREOP 2/6/2019  HAS CARDIAC CLEARANCE    EXAMINATION UNDER ANESTHESIA Left 12/28/2018    Procedure: Exam under anesthesia, left foot debridement, left foot washout, possible left second through fifth metatarsal resection;  Surgeon: Mitch Wall MD;  Location: [facility];  Service: Vascular;  Laterality: Left;  1:00pm start per julissa @ 8:58am  RN  PHONE PRE OP 12-27-18--=[facility]  NEED CONSENT    EXAMINATION UNDER ANESTHESIA Left 6/5/2019    Procedure: LEFT FOOT DEBRIDEMENT, WASHOUT AND ALL OTHER INDICATED PROCEDURES;  Surgeon: Mitch Wall MD;  Location: [facility];  Service: Vascular;  Laterality: Left;  RN PRE OP 5-31-19------ICD------NEED CONSENT    INCISION AND DRAINAGE Left 11/14/2018    Procedure: Incision and Drainage, left lower extremity debridement, washout;  Surgeon: Mitch Wall MD;  Location: [facility];  Service: Vascular;  Laterality: Left;    INCISION AND DRAINAGE Left 11/16/2018    Procedure: INCISION AND DRAINAGE;  Surgeon: Mitch Wall MD;  Location:  [facility] OR;  Service: Vascular;  Laterality: Left;    INTRAOCULAR PROSTHESES INSERTION Right 9/26/2018    Procedure: INSERTION, IOL PROSTHESIS;  Surgeon: Perla Cortés MD;  Location: [address];  Service: Ophthalmology;  Laterality: Right;    PERITONEOCENTESIS N/A 3/1/2019    Procedure: PARACENTESIS, ABDOMINAL, INITIAL;  Surgeon: Dosc Diagnostic Provider;  Location: [facility] OR;  Service: Radiology;  Laterality: N/A;    PERITONEOCENTESIS N/A 3/25/2019    Procedure: PARACENTESIS, ABDOMINAL, INITIAL;  Surgeon: Dosc Diagnostic Provider;  Location: [facility] OR;  Service: Radiology;  Laterality: N/A;    PERITONEOCENTESIS N/A 7/22/2019    Procedure: PARACENTESIS, ABDOMINAL, INITIAL;  Surgeon: Dosc Diagnostic Provider;  Location: [facility] OR;  Service: Radiology;  Laterality: N/A;    PERITONEOCENTESIS N/A 8/16/2019    Procedure: PARACENTESIS, ABDOMINAL, INITIAL;  Surgeon: Dosc Diagnostic Provider;  Location: [facility] OR;  Service: Radiology;  Laterality: N/A;    PERITONEOCENTESIS N/A 9/26/2019    Procedure: PARACENTESIS, ABDOMINAL;  Surgeon: Dosc Diagnostic Provider;  Location: [facility] OR;  Service: Radiology;  Laterality: N/A;    PERITONEOCENTESIS N/A 10/11/2019    Procedure: PARACENTESIS, ABDOMINAL;  Surgeon: Dosc Diagnostic Provider;  Location: [facility] OR;  Service: Radiology;  Laterality: N/A;    PERITONEOCENTESIS N/A 10/31/2019    Procedure: PARACENTESIS, ABDOMINAL;  Surgeon: Dosc Diagnostic Provider;  Location: [facility] OR;  Service: Radiology;  Laterality: N/A;    PERITONEOCENTESIS N/A 11/18/2019    Procedure: PARACENTESIS, ABDOMINAL;  Surgeon: Dosc Diagnostic Provider;  Location: [facility] OR;  Service: Radiology;  Laterality: N/A;    PERITONEOCENTESIS N/A 12/16/2019    Procedure: PARACENTESIS, ABDOMINAL;  Surgeon: Dosc Diagnostic Provider;  Location: [facility] OR;  Service: Radiology;  Laterality: N/A;  2PM START    PERITONEOCENTESIS N/A 2/7/2020    Procedure: PARACENTESIS, ABDOMINAL;  Surgeon: Dosc Diagnostic Provider;   Location: [facility] OR;  Service: Radiology;  Laterality: N/A;  REQUESTED 10:30A START    PERITONEOCENTESIS N/A 2/19/2020    Procedure: PARACENTESIS, ABDOMINAL;  Surgeon: Dosc Diagnostic Provider;  Location: [facility] OR;  Service: Radiology;  Laterality: N/A;    PERITONEOCENTESIS N/A 2/28/2020    Procedure: PARACENTESIS, ABDOMINAL;  Surgeon: Dosc Diagnostic Provider;  Location: [facility] OR;  Service: Radiology;  Laterality: N/A;  REQUESTED 10AM START    PHACOEMULSIFICATION OF CATARACT Right 9/26/2018    Procedure: PHACOEMULSIFICATION, CATARACT;  Surgeon: Perla Cortés MD;  [facility];  Service: Ophthalmology;  Laterality: Right;    REPEAT ABDOMINAL PARACENTESIS N/A 2/11/2019    Procedure: PARACENTESIS, ABDOMINAL, REPEAT;  Surgeon: Dosc Diagnostic Provider;  Location: [facility] OR;  Service: Radiology;  Laterality: N/A;  1PM START    REPEAT ABDOMINAL PARACENTESIS N/A 9/12/2019    Procedure: PARACENTESIS, ABDOMINAL, REPEAT;  Surgeon: Dosc Diagnostic Provider;  Location: [facility] OR;  Service: Radiology;  Laterality: N/A;  9AM START    REPEAT ABDOMINAL PARACENTESIS N/A 12/2/2019    Procedure: PARACENTESIS, ABDOMINAL, REPEAT;  Surgeon: Dosc Diagnostic Provider;  Location: [facility] OR;  Service: Radiology;  Laterality: N/A;  3PM START    REPEAT ABDOMINAL PARACENTESIS N/A 1/10/2020    Procedure: PARACENTESIS, ABDOMINAL, REPEAT;  Surgeon: Dosc Diagnostic Provider;  Location: [facility] OR;  Service: Radiology;  Laterality: N/A;  1130AM START    REPEAT ABDOMINAL PARACENTESIS N/A 1/20/2020    Procedure: PARACENTESIS, ABDOMINAL, REPEAT;  Surgeon: Dosc Diagnostic Provider;  Location: [facility] OR;  Service: Radiology;  Laterality: N/A;  1PM START    REPEAT ABDOMINAL PARACENTESIS N/A 1/31/2020    Procedure: PARACENTESIS, ABDOMINAL, REPEAT;  Surgeon: Dosc Diagnostic Provider;  Location: [facility] OR;  Service: Radiology;  Laterality: N/A;  10AM START    REPEAT ABDOMINAL PARACENTESIS N/A 3/16/2020    Procedure: PARACENTESIS, ABDOMINAL,  REPEAT;  Surgeon: [facility] Diagnostic Provider;  Location: [facility] OR;  Service: Radiology;  Laterality: N/A;  10AM START    REPEAT ABDOMINAL PARACENTESIS N/A 3/30/2020    Procedure: PARACENTESIS, ABDOMINAL, REPEAT;  Surgeon: Dosc Diagnostic Provider;  Location: [facility] OR;  Service: Radiology;  Laterality: N/A;    REPEAT ABDOMINAL PARACENTESIS N/A 4/9/2020    Procedure: PARACENTESIS, ABDOMINAL, REPEAT;  Surgeon: Dos Diagnostic Provider;  Location: [facility] OR;  Service: Radiology;  Laterality: N/A;  1130AM START    REPEAT ABDOMINAL PARACENTESIS N/A 5/8/2020    Procedure: PARACENTESIS, ABDOMINAL, REPEAT;  Surgeon: Dos Diagnostic Provider;  Location: [facility] OR;  Service: Radiology;  Laterality: N/A;  9AM START    REPEAT ABDOMINAL PARACENTESIS N/A 5/21/2020    Procedure: PARACENTESIS, ABDOMINAL, REPEAT;  Surgeon: Dos Diagnostic Provider;  Location: [facility] OR;  Service: Radiology;  Laterality: N/A;  10AM START    REPEAT ABDOMINAL PARACENTESIS N/A 6/5/2020    Procedure: PARACENTESIS, ABDOMINAL, REPEAT;  Surgeon: Dos Diagnostic Provider;  Location: [facility] OR;  Service: Radiology;  Laterality: N/A;  NOON START    REPEAT ABDOMINAL PARACENTESIS N/A 7/10/2020    Procedure: PARACENTESIS, ABDOMINAL, REPEAT;  Surgeon: Dos Diagnostic Provider;  Location: [facility] OR;  Service: Radiology;  Laterality: N/A;  1PM START    REPEAT ABDOMINAL PARACENTESIS N/A 8/20/2020    Procedure: PARACENTESIS, ABDOMINAL, REPEAT;  Surgeon: Dos Diagnostic Provider;  Location: [facility] OR;  Service: Radiology;  Laterality: N/A;  1PM START    REVISION OF PROCEDURE INVOLVING GLAUCOMA DRAINAGE DEVICE Left 10/2/2019    EXTRUDING BAERVELDT /WITH PERICARDIAL PATCH GRAFT ()    Right foot surgery  10/2014    TOE AMPUTATION Right     first and second    TOE AMPUTATION Left 11/16/2018    Procedure: AMPUTATION, TOES  2-5;  Surgeon: Mitch Chan MD;  Location: [facility] OR;  Service: Vascular;  Laterality: Left;    TOE AMPUTATION Left 12/5/2018    Procedure:  AMPUTATION, TOE;  Surgeon: Mitch Chan MD;  Location: [facility] OR;  Service: Vascular;  Laterality: Left;  left great toe    TONSILLECTOMY       Home Meds:   Prior to Admission medications    Medication Sig Start Date End Date Taking? Authorizing Provider   allopurinoL (ZYLOPRIM) 300 MG tablet TAKE 1 TABLET(300 MG) BY MOUTH EVERY DAY 6/15/20   Rubén Davis MD   aspirin (ECOTRIN) 81 MG EC tablet Take 81 mg by mouth once daily.    Historical Provider, MD   bisacodyl (DULCOLAX) 5 mg EC tablet Take 5 mg by mouth daily as needed for Constipation.    Historical Provider, MD   brimonidine 0.2% (ALPHAGAN) 0.2 % Drop Place 1 drop into both eyes 3 (three) times daily. 12/12/19   Perla Cortés MD   carvediloL (COREG) 3.125 MG tablet TAKE 1 TABLET(3.125 MG) BY MOUTH TWICE DAILY 8/17/20   Rubén Davis MD   cefadroxil (DURICEF) 1 gram tablet Take 1 tablet (1 g total) by mouth 2 (two) times daily. for 10 days 8/24/20 9/3/20  Dixon West MD   ciprofloxacin HCl (CIPRO) 500 MG tablet Take 1 tablet (500 mg total) by mouth every 12 (twelve) hours. for 10 days 8/24/20 9/3/20  Dixon West MD   coenzyme Q10 100 mg capsule Take 100 mg by mouth every morning.    Historical Provider, MD   dorzolamide-timolol 2-0.5% (COSOPT) 22.3-6.8 mg/mL ophthalmic solution Place 1 drop into both eyes 3 (three) times daily. 12/12/19   Perla Cortés MD   ezetimibe (ZETIA) 10 mg tablet TAKE 1 TABLET(10 MG) BY MOUTH EVERY DAY 7/16/20   Rubén Davis MD   fish oil-omega-3 fatty acids 300-1,000 mg capsule Take 1 capsule by mouth 2 (two) times daily. 1/23/19   Sara Ching MD   gabapentin (NEURONTIN) 100 MG capsule TAKE 2 CAPSULES(200 MG) BY MOUTH EVERY EVENING 7/28/20   Rubén Davis MD   insulin degludec-liraglutide (XULTOPHY 100/3.6) 100 unit-3.6 mg /mL (3 mL) InPn Inject 34 Units into the skin once daily. 1/28/20   Sheryl Madison NP   MULTIVIT,THER IRON,CA,FA & MIN (MULTIVITAMIN) Tab Take 1 tablet by mouth every  "morning.     Historical Provider, MD   pen needle, diabetic 31 gauge x 1/4" Ndle Use as directed 8/11/16   Mike Bass MD   torsemide (DEMADEX) 20 MG Tab Take 4 tablets (80 mg total) by mouth 2 (two) times daily. 6/16/20 7/16/20  Rubén Davis MD     Anticoagulants/Antiplatelets: no anticoagulation    Allergies:   Review of patient's allergies indicates:   Allergen Reactions    Statins-hmg-coa reductase inhibitors      Generalized Pain    Onglyza [saxagliptin]     Penicillins Rash     Sedation History:  no adverse reactions     Review of Systems:   Hematological: no known coagulopathies  Respiratory: positive for - orthopnea and shortness of breath  Cardiovascular: positive for - dyspnea on exertion, orthopnea and shortness of breath  Gastrointestinal: positive for - abdominal pain and distension  Genito-Urinary: no dysuria, trouble voiding, or hematuria  Musculoskeletal: negative  Neurological: no TIA or stroke symptoms      OBJECTIVE:     Vital Signs (Most Recent)     Physical Exam:  General: no acute distress  Mental Status: alert and oriented to person, place and time  HEENT: normocephalic, atraumatic  Chest: mild labored breathing  Heart: regular heart rate  Abdomen: +distended. No TTP/r/g.  Extremity: moves all extremities    Laboratory  Lab Results   Component Value Date    INR 1.1 05/31/2019       Lab Results   Component Value Date    WBC 10.60 08/25/2020    HGB 11.8 (L) 08/25/2020    HCT 37.4 (L) 08/25/2020    MCV 93 08/25/2020     08/25/2020      Lab Results   Component Value Date     (H) 08/25/2020     08/25/2020    K 4.2 08/25/2020     08/25/2020    CO2 26 08/25/2020    BUN 44 (H) 08/25/2020    CREATININE 1.3 08/25/2020    CALCIUM 8.3 (L) 08/25/2020    MG 2.1 05/31/2019    ALT 30 08/25/2020    AST 21 08/25/2020    ALBUMIN 2.4 (L) 08/25/2020    BILITOT 0.7 08/25/2020     ASSESSMENT/PLAN:     59 y/o M with CKD III and combined systolic/diastolic CHF complicated by " recurrent abdominal distension and pain 2/2 recurrent painful, tense ascites without TTP on PE to suggest SBP, requiring frequent large-volume therapeutic paracentesis.      1. Will attempt U/S-guided therapeutic paracentesis with local anesthetic only and albumin infusion post PRN per institutional protocol.      Risks (including, but not limited to, pain, bleeding, infection, damage to nearby structures, failure to obtain sufficient material for a diagnosis, the need for additional procedures, and death), benefits, and alternatives were discussed with the patient. All questions were answered to the best of my abilities. The patient wishes to proceed with the procedure. Written informed consent was obtained.     Thank you for considering IR for the care of your patient.      Zach Cha MD  Interventional Radiology

## 2020-08-27 NOTE — NURSING
Procedure successfully done without complications. Lidocaine used to right abdomen to numb location. Para needle inserted and 4350 mL of peritoneal fluid drained. VSS throughout. No need for SDS, discharged to home.

## 2020-09-01 ENCOUNTER — HOSPITAL ENCOUNTER (OUTPATIENT)
Dept: WOUND CARE | Facility: HOSPITAL | Age: 60
Discharge: HOME | End: 2020-09-01
Attending: FAMILY MEDICINE
Payer: MEDICAID

## 2020-09-01 VITALS
SYSTOLIC BLOOD PRESSURE: 131 MMHG | DIASTOLIC BLOOD PRESSURE: 68 MMHG | RESPIRATION RATE: 17 BRPM | HEART RATE: 75 BPM | TEMPERATURE: 97 F

## 2020-09-01 DIAGNOSIS — L97.919 DIABETIC ULCER OF RIGHT LOWER LEG: ICD-10-CM

## 2020-09-01 DIAGNOSIS — S91.302D OPEN WOUND OF FOOT, LEFT, SUBSEQUENT ENCOUNTER: Primary | ICD-10-CM

## 2020-09-01 DIAGNOSIS — L97.516 DIABETIC ULCER OF TOE OF RIGHT FOOT ASSOCIATED WITH TYPE 2 DIABETES MELLITUS, WITH BONE INVOLVEMENT WITHOUT EVIDENCE OF NECROSIS: ICD-10-CM

## 2020-09-01 DIAGNOSIS — E11.621 DIABETIC ULCER OF TOE OF RIGHT FOOT ASSOCIATED WITH TYPE 2 DIABETES MELLITUS, WITH BONE INVOLVEMENT WITHOUT EVIDENCE OF NECROSIS: ICD-10-CM

## 2020-09-01 DIAGNOSIS — E11.622 DIABETIC ULCER OF RIGHT LOWER LEG: ICD-10-CM

## 2020-09-01 PROCEDURE — 11042 DEBRIDEMENT: ICD-10-PCS | Mod: ,,, | Performed by: FAMILY MEDICINE

## 2020-09-01 PROCEDURE — 11045 DEBRIDEMENT: ICD-10-PCS | Mod: ,,, | Performed by: FAMILY MEDICINE

## 2020-09-01 PROCEDURE — 11045 DBRDMT SUBQ TISS EACH ADDL: CPT | Performed by: FAMILY MEDICINE

## 2020-09-01 PROCEDURE — 11042 DBRDMT SUBQ TIS 1ST 20SQCM/<: CPT | Performed by: FAMILY MEDICINE

## 2020-09-01 PROCEDURE — 11042 DBRDMT SUBQ TIS 1ST 20SQCM/<: CPT | Mod: ,,, | Performed by: FAMILY MEDICINE

## 2020-09-01 PROCEDURE — 99214 OFFICE O/P EST MOD 30 MIN: CPT | Mod: 25,,, | Performed by: FAMILY MEDICINE

## 2020-09-01 PROCEDURE — 27201912 HC WOUND CARE DEBRIDEMENT SUPPLIES: Performed by: FAMILY MEDICINE

## 2020-09-01 PROCEDURE — 11045 DBRDMT SUBQ TISS EACH ADDL: CPT | Mod: ,,, | Performed by: FAMILY MEDICINE

## 2020-09-01 PROCEDURE — 99214 PR OFFICE/OUTPT VISIT, EST, LEVL IV, 30-39 MIN: ICD-10-PCS | Mod: 25,,, | Performed by: FAMILY MEDICINE

## 2020-09-01 NOTE — PROGRESS NOTES
[facility]                Progress Note    Subjective:       Patient ID: Marvin Ray is a 60 y.o. male.    Chief Complaint: Non-healing Wound Follow Up and Diabetic Foot Ulcer    09/1/2020: F/u visit.  Patient following up for bilateral DFU's  He has been keeping dressings in place.  No odor and no pain.  He has not had any fevers or chills.  Once in the room and wounds assessed:   Left foot wounds are improving and measuring smaller. Right toe third bone exposed. Right medial foot macerated, applied drawtex/mextra/ABD.      Review of Systems   Constitutional: Negative for activity change, appetite change and diaphoresis.   HENT: Negative for congestion, ear pain and sinus pain.    Eyes: Negative.    Respiratory: Negative for cough, shortness of breath and stridor.    Cardiovascular: Positive for leg swelling. Negative for chest pain.   Gastrointestinal: Negative.    Musculoskeletal: Negative.    Skin: Positive for wound.         Objective:        Physical Exam  Vitals signs reviewed.   Constitutional:       Appearance: Normal appearance.   HENT:      Head: Normocephalic and atraumatic.      Right Ear: External ear normal.      Left Ear: External ear normal.      Nose: Nose normal.      Mouth/Throat:      Mouth: Mucous membranes are moist.      Pharynx: Oropharynx is clear.   Eyes:      Extraocular Movements: Extraocular movements intact.      Pupils: Pupils are equal, round, and reactive to light.   Neck:      Musculoskeletal: Normal range of motion and neck supple.   Cardiovascular:      Rate and Rhythm: Normal rate and regular rhythm.      Pulses: Normal pulses.   Pulmonary:      Effort: Pulmonary effort is normal. No respiratory distress.      Breath sounds: No wheezing.   Abdominal:      General: There is no distension.      Palpations: Abdomen is soft.      Tenderness: There is no abdominal tenderness.   Musculoskeletal:      Right lower leg: Edema present.  "     Left lower leg: Edema present.   Skin:     General: Skin is warm and dry.      Comments: +bilateral DFU's.  Right medial foot wound has excess slough and drainage; left foot doing well    Neurological:      Mental Status: He is alert and oriented to person, place, and time.      Sensory: Sensory deficit present.   Psychiatric:         Mood and Affect: Mood normal.         Behavior: Behavior normal.           Vitals:    09/01/20 1021   BP: 131/68   Pulse: 75   Resp: 17   Temp: 97.3 °F (36.3 °C)     Debridement    Date/Time: 9/1/2020 12:14 PM  Performed by: Rubén Davis MD  Authorized by: Rubén Davis MD     Time out: Immediately prior to procedure a "time out" was called to verify the correct patient, procedure, equipment, support staff and site/side marked as required.    Consent Done?:  Yes (Written)  Local anesthesia used?: Yes    Local anesthetic:  Topical anesthetic  Anesthetic total (ml):  5    Wound Details:    Location:  Right ankle       Length (cm):  2.3       Area (sq cm):  11.96       Width (cm):  5.2       Percent Debrided (%):  100       Depth (cm):  0.2       Total Area Debrided (sq cm):  11.96    Depth of debridement:  Subcutaneous tissue    Tissue debrided:  Dermis, Epidermis and Subcutaneous    Devitalized tissue debrided:  Callus, Exudate, Fibrin, Slough and Biofilm    Instruments:  Curette    Additional wounds:  1    2nd Wound Details:     Location:  Right foot    Location:  Right Midfoot    Location:  Right Midfoot    Type of Debridement:  Excisional       Length (cm):  10       Area (sq cm):  50       Width (cm):  5       Percent Debrided (%):  50       Depth (cm):  0.4       Total Area Debrided (sq cm):  25    Depth of debridement:  Subcutaneous tissue    Tissue debrided:  Dermis, Epidermis and Subcutaneous    Devitalized tissue debrided:  Biofilm, Fibrin and Slough    Instruments:  Curette    Bleeding:  Minimal  Hemostasis Achieved: Yes    Method Used:  Pressure  Patient " tolerance:  Patient tolerated the procedure well with no immediate complications      Assessment:           ICD-10-CM ICD-9-CM   1. Open wound of foot, left, subsequent encounter  S91.302D V58.89     892.0   2. Diabetic ulcer of right lower leg  E11.622 250.80    L97.919 707.10   3. Diabetic ulcer of toe of right foot associated with type 2 diabetes mellitus, with bone involvement without evidence of necrosis  E11.621 250.80    L97.516 707.15            Wound 11/02/18 Diabetic Ulcer plantar Foot (Active)   11/02/18     Pre-existing: Yes   Primary Wound Type: Diabetic ulc   Side: Left   Orientation: plantar   Location: Foot   Wound Number (optional):    Ankle-Brachial Index:    Pulses:    Removal Indication and Assessment:    Wound Outcome:    (Retired) Wound Type:    (Retired) Wound Length (cm):    (Retired) Wound Width (cm):    (Retired) Depth (cm):    Wound Description (Comments):    Removal Indications:    Wound Image   09/01/20 1000   Wound WDL WDL 09/01/20 1000   Dressing Appearance Intact;Moist drainage 09/01/20 1000   Drainage Amount Moderate 09/01/20 1000   Drainage Characteristics/Odor Serous 09/01/20 1000   Appearance Pink;Red;White;Yellow 09/01/20 1000   Tissue loss description Partial thickness 09/01/20 1000   Black (%), Wound Tissue Color 0 % 09/01/20 1000   Red (%), Wound Tissue Color 95 % 09/01/20 1000   Yellow (%), Wound Tissue Color 5 % 09/01/20 1000   Periwound Area Macerated;Scar tissue 09/01/20 1000   Wound Edges Defined 09/01/20 1000   Wound Length (cm) 2.3 cm 09/01/20 1000   Wound Width (cm) 5.2 cm 09/01/20 1000   Wound Depth (cm) 0.2 cm 09/01/20 1000   Wound Volume (cm^3) 2.39 cm^3 09/01/20 1000   Wound Surface Area (cm^2) 11.96 cm^2 09/01/20 1000   Tunneling (depth (cm)/location) 0 09/01/20 1000   Undermining (depth (cm)/location) 0 09/01/20 1000   Care Soap and water;Sterile normal saline;Wound cleanser 09/01/20 1000   Dressing Applied;Collagen;Abd pad 09/01/20 1000   Periwound Care Other  (see comments) 09/01/20 1000   Off Loading Football dressing 09/01/20 1000   Dressing Change Due 09/08/20 09/01/20 1000            Wound 05/08/20 1015 Left Foot (Active)   05/08/20 1015    Pre-existing: Yes   Primary Wound Type:    Side: Left   Orientation:    Location: Foot   Wound Number (optional):    Ankle-Brachial Index:    Pulses:    Removal Indication and Assessment:    Wound Outcome:    (Retired) Wound Type:    (Retired) Wound Length (cm):    (Retired) Wound Width (cm):    (Retired) Depth (cm):    Wound Description (Comments):    Removal Indications:    Wound Image   09/01/20 1000   Wound WDL WDL 09/01/20 1000   Dressing Appearance Intact;Moist drainage 09/01/20 1000   Drainage Amount Moderate 09/01/20 1000   Drainage Characteristics/Odor Serous 09/01/20 1000   Appearance Pink;Red;White;Slough 09/01/20 1000   Tissue loss description Partial thickness 09/01/20 1000   Black (%), Wound Tissue Color 0 % 09/01/20 1000   Red (%), Wound Tissue Color 95 % 09/01/20 1000   Yellow (%), Wound Tissue Color 5 % 09/01/20 1000   Periwound Area Macerated;Scar tissue 09/01/20 1000   Wound Edges Defined 09/01/20 1000   Wound Length (cm) 2.5 cm 09/01/20 1000   Wound Width (cm) 5 cm 09/01/20 1000   Wound Depth (cm) 0.1 cm 09/01/20 1000   Wound Volume (cm^3) 1.25 cm^3 09/01/20 1000   Wound Surface Area (cm^2) 12.5 cm^2 09/01/20 1000   Tunneling (depth (cm)/location) 0 09/01/20 1000   Undermining (depth (cm)/location) 0 09/01/20 1000   Care Soap and water;Sterile normal saline;Wound cleanser 09/01/20 1000   Dressing Applied;Collagen;Abd pad 09/01/20 1000   Periwound Care Other (see comments) 09/01/20 1000   Off Loading Football dressing 09/01/20 1000   Dressing Change Due 09/08/20 09/01/20 1000            Wound 08/11/20 1051 Diabetic Ulcer Right medial Malleolus/Ankle (Active)   08/11/20 1051    Pre-existing: No   Primary Wound Type: Diabetic ulc   Side: Right   Orientation: medial   Location: Malleolus/Ankle   Wound Number  (optional):    Ankle-Brachial Index:    Pulses:    Removal Indication and Assessment:    Wound Outcome:    (Retired) Wound Type:    (Retired) Wound Length (cm):    (Retired) Wound Width (cm):    (Retired) Depth (cm):    Wound Description (Comments):    Removal Indications:    Wound Image   09/01/20 1000   Wound WDL ex 09/01/20 1000   Dressing Appearance Intact;Moist drainage 09/01/20 1000   Drainage Amount Moderate 09/01/20 1000   Drainage Characteristics/Odor Serous 09/01/20 1000   Appearance Pink;Red;Slough;White 09/01/20 1000   Tissue loss description Partial thickness 09/01/20 1000   Black (%), Wound Tissue Color 0 % 09/01/20 1000   Red (%), Wound Tissue Color 70 % 09/01/20 1000   Yellow (%), Wound Tissue Color 30 % 09/01/20 1000   Periwound Area Macerated;Redness 09/01/20 1000   Wound Edges Defined 09/01/20 1000   Wound Length (cm) 2.3 cm 09/01/20 1000   Wound Width (cm) 2.5 cm 09/01/20 1000   Wound Depth (cm) 0.2 cm 09/01/20 1000   Wound Volume (cm^3) 1.15 cm^3 09/01/20 1000   Wound Surface Area (cm^2) 5.75 cm^2 09/01/20 1000   Tunneling (depth (cm)/location) 0 09/01/20 1000   Undermining (depth (cm)/location) 0 09/01/20 1000   Care Soap and water;Wound cleanser;Sterile normal saline 09/01/20 1000   Dressing Applied;Other (see comments) 09/01/20 1000   Periwound Care Other (see comments) 09/01/20 1000   Off Loading Football dressing 09/01/20 1000   Dressing Change Due 09/08/20 09/01/20 1000            Wound 08/11/20 1052 Diabetic Ulcer Right medial Foot (Active)   08/11/20 1052    Pre-existing: No   Primary Wound Type: Diabetic ulc   Side: Right   Orientation: medial   Location: Foot   Wound Number (optional):    Ankle-Brachial Index:    Pulses:    Removal Indication and Assessment:    Wound Outcome:    (Retired) Wound Type:    (Retired) Wound Length (cm):    (Retired) Wound Width (cm):    (Retired) Depth (cm):    Wound Description (Comments):    Removal Indications:    Wound Image   09/01/20 1000   Wound WDL  ex 09/01/20 1000   Dressing Appearance Intact;Moist drainage 09/01/20 1000   Drainage Amount Moderate 09/01/20 1000   Drainage Characteristics/Odor Serous 09/01/20 1000   Appearance Red;Pink;Slough;White 09/01/20 1000   Tissue loss description Partial thickness 09/01/20 1000   Black (%), Wound Tissue Color 0 % 09/01/20 1000   Red (%), Wound Tissue Color 20 % 09/01/20 1000   Yellow (%), Wound Tissue Color 80 % 09/01/20 1000   Periwound Area Macerated 09/01/20 1000   Wound Edges Irregular 09/01/20 1000   Wound Length (cm) 10 cm 09/01/20 1000   Wound Width (cm) 5 cm 09/01/20 1000   Wound Depth (cm) 0.4 cm 09/01/20 1000   Wound Volume (cm^3) 20 cm^3 09/01/20 1000   Wound Surface Area (cm^2) 50 cm^2 09/01/20 1000   Tunneling (depth (cm)/location) 0 09/01/20 1000   Undermining (depth (cm)/location) 0 09/01/20 1000   Care Soap and water;Sterile normal saline;Wound cleanser 09/01/20 1000   Dressing Applied;Other (see comments) 09/01/20 1000   Periwound Care Other (see comments) 09/01/20 1000   Off Loading Football dressing 09/01/20 1000   Dressing Change Due 09/08/20 09/01/20 1000            Incision/Site 06/16/20 0930 Right Toe, third anterior (Active)   06/16/20 0930   Present Prior to Hospital Arrival?: Yes   Side: Right   Location: Toe, third   Orientation: anterior   Incision Type:    Closure Method:    Additional Comments:    Removal Indication and Assessment:    Wound Outcome:    Removal Indications:    Wound Image   09/01/20 1000   Incision WDL ex 09/01/20 1000   Dressing Appearance Intact;Moist drainage 09/01/20 1000   Drainage Amount Small 09/01/20 1000   Drainage Characteristics/Odor Purulent 09/01/20 1000   Appearance Pink;Bone 09/01/20 1000   Black (%), Wound Tissue Color 0 % 09/01/20 1000   Red (%), Wound Tissue Color 100 % 09/01/20 1000   Yellow (%), Wound Tissue Color 0 % 09/01/20 1000   Periwound Area Redness;Swelling 09/01/20 1000   Wound Edges Defined 09/01/20 1000   Wound Length (cm) 0.2 cm 09/01/20 1000    Wound Width (cm) 0.4 cm 09/01/20 1000   Wound Depth (cm) 0.3 cm 09/01/20 1000   Wound Volume (cm^3) 0.02 cm^3 09/01/20 1000   Wound Surface Area (cm^2) 0.08 cm^2 09/01/20 1000   Tunneling (depth (cm)/location) 0 09/01/20 1000   Undermining (depth (cm)/location) 0 09/01/20 1000   Care Sterile normal saline;Soap and water;Wound cleanser 09/01/20 1000   Dressing Applied;Collagen;Other (see comments) 09/01/20 1000   Off Loading Football dressing 09/01/20 1000   Dressing Change Due 09/08/20 09/01/20 1000           Plan:                Orders Placed This Encounter   Procedures    Debridement     This order was created via procedure documentation     Standing Status:   Standing     Number of Occurrences:   1    Change dressing     Left foot- gentian violet periwound, promogran, ABD pads X2, football dressing (cast padding X2, stockinet). Gentian violet periwound to all wounds on the right foot. Right toe third- cutimed, stacy, demian foam short cut to fit, Right medial malleolus- medhoney, gauze,demian foam short X1, Right medial foot- mesalt to wound, drawtex strip, mextra short X1, ABD pad X1, football dressing (cast padding X3, stockinet).        Follow up in about 1 week (around 9/8/2020) for wound care.     Rubén Davis MD

## 2020-09-02 ENCOUNTER — OFFICE VISIT (OUTPATIENT)
Dept: VASCULAR SURGERY | Facility: CLINIC | Age: 60
End: 2020-09-02
Payer: MEDICAID

## 2020-09-02 VITALS — DIASTOLIC BLOOD PRESSURE: 70 MMHG | HEIGHT: 70 IN | SYSTOLIC BLOOD PRESSURE: 132 MMHG | BODY MASS INDEX: 29.39 KG/M2

## 2020-09-02 DIAGNOSIS — L97.919 DIABETIC ULCER OF RIGHT LOWER LEG: ICD-10-CM

## 2020-09-02 DIAGNOSIS — E11.622 DIABETIC ULCER OF RIGHT LOWER LEG: ICD-10-CM

## 2020-09-02 PROCEDURE — 97597 PR DEBRIDEMENT OPEN WOUND 20 SQ CM<: ICD-10-PCS | Mod: S$PBB,,, | Performed by: SURGERY

## 2020-09-02 PROCEDURE — 99214 OFFICE O/P EST MOD 30 MIN: CPT | Mod: PBBFAC | Performed by: SURGERY

## 2020-09-02 PROCEDURE — 99215 OFFICE O/P EST HI 40 MIN: CPT | Mod: 25,S$PBB,, | Performed by: SURGERY

## 2020-09-02 PROCEDURE — 99999 PR PBB SHADOW E&M-EST. PATIENT-LVL IV: CPT | Mod: PBBFAC,,, | Performed by: SURGERY

## 2020-09-02 PROCEDURE — 99999 PR PBB SHADOW E&M-EST. PATIENT-LVL IV: ICD-10-PCS | Mod: PBBFAC,,, | Performed by: SURGERY

## 2020-09-02 PROCEDURE — 97597 DBRDMT OPN WND 1ST 20 CM/<: CPT | Mod: S$PBB,,, | Performed by: SURGERY

## 2020-09-02 PROCEDURE — 97597 DBRDMT OPN WND 1ST 20 CM/<: CPT | Mod: PBBFAC | Performed by: SURGERY

## 2020-09-02 PROCEDURE — 99215 PR OFFICE/OUTPT VISIT, EST, LEVL V, 40-54 MIN: ICD-10-PCS | Mod: 25,S$PBB,, | Performed by: SURGERY

## 2020-09-02 NOTE — PROGRESS NOTES
Mitch Chan MD [facility]                         09/02/2020    HPI:  Marvin Ray is a 60 y.o. male with   Patient Active Problem List   Diagnosis    Epiretinal membrane, both eyes    Nuclear sclerotic cataract of right eye    Pseudophakia, left eye    Ptosis, left eyelid    DM type 2 with diabetic peripheral neuropathy    HLD (hyperlipidemia)    Neovascular glaucoma of both eyes    Tophaceous gout    Essential hypertension    Coronary artery disease involving native coronary artery of native heart without angina pectoris    Neovascular glaucoma of left eye, severe stage    Statin myopathy    Neovascular glaucoma, right eye, mild stage    Left leg cellulitis    PVD (peripheral vascular disease)    Right wrist pain    Multiple open wounds of lower leg    Hepatosplenomegaly    Type 2 diabetes mellitus with left diabetic foot ulcer    Open wound of left foot    Cellulitis of left lower extremity    Diabetic foot infection    Other ascites    Severe malnutrition    Goals of care, counseling/discussion    Alteration in skin integrity    MDR Acinetobacter baumannii infection    Takes dietary supplements    Intertriginous dermatitis associated with moisture    Debility    Infection due to multidrug-resistant Pseudomonas aeruginosa    Subacute osteomyelitis of left foot    Ascites    Acute on chronic combined systolic and diastolic congestive heart failure    Stage 3 chronic kidney disease    Atherosclerosis of left lower extremity with ulceration of midfoot    CKD stage 3 due to type 2 diabetes mellitus    Azotemia    Hyponatremia    Anemia due to multiple mechanisms    Persistent proteinuria    Hypokalemia    Open wound of foot, left, subsequent encounter    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    Ischemic cardiomyopathy    Status post abdominal paracentesis    Diabetic ulcer of right lower leg     Diabetic ulcer of toe of right foot associated with type 2 diabetes mellitus, with bone involvement without evidence of necrosis    being managed by PCP and specialists who is here today for evaluation of L foot wound.  Seen in hospital last mo with LLE cellulitis.  Treated with Abx.  Also had paracentesis for ascites with negative w/u per family.  Patient states location is L foot occurring for 1-2 mo, worsening foot wound although improvement in edema and erythema.  Associated signs and symptoms include pain and edema.  Quality is aching and severity is 5/10.  Symptoms began 10/2018 spontaneously with blistering and severe edema.  Alleviating factors include wound care and elevation.  Worsening factors include pressure.    Tobacco use: denies    1/4/19: s/p LLE angioplasty and multiple subsequent OR and bedside debridements.  On IV Abx per culture results.  Glucose better controlled.  No fevers.  Receiving local wound care with Santyl.    1/14/19:  Cont to receive local wound care.  Pseudomonas in tissue and bone cultures multidrug resistant other than aminoglycoside; d/w Dr. Velez with high risk of ESRD with 6 weeks of aminoglycoside treatment.  No F/C.    1/31/19:  Pt without complaints.  Was started back on Bactrim.  Family doing wound care.    2/28/19: S/p L foot debridement 2/13/19.  Started on Meropenem.    3/28/19:  No complaints.  S/p paracentesis and pt feels better.    5/16/19:  S/p L peroneal and AT angioplasty, wound healing well.    9/30/19:  S/p L foot debridement 6/5/19. WV placed.    11/2019:  States wound is healing well.  Did not see Plastic Surgery clinic.  Cont wound care center and home health wound care.    2/2020: States wound nearly fully healed.  Receiving wound care daily.    5/2020:  Wounds healing.  Seeing Dr. Davis next week.    7/2020:  Doing well with L foot wound, has developed a R medial maleolus wound due to trauma    8/2020:  Wounds healing BLE.    Past Medical History:    Diagnosis Date    Arthritis     Cellulitis     CKD (chronic kidney disease), stage III     Coronary artery disease     Diabetes mellitus     Diabetic retinopathy     Diabetic ulcer of left foot     Glaucoma     Gout     Hyperlipemia     Hypertension     ICD (implantable cardioverter-defibrillator) in place 11/02/2018    Left chest    Non-pressure chronic ulcer of other part of left foot with fat layer exposed 10/23/2018    PVD (peripheral vascular disease)     Type 2 diabetes mellitus with left diabetic foot ulcer 10/29/2018    Unsteady gait     uses a wheelchair     Past Surgical History:   Procedure Laterality Date    AHMED GLAUCOMA IMPLANT Left 2011    DONE AT [facility]    AORTOGRAPHY WITH SERIALOGRAPHY Left 4/30/2019    Procedure: AORTOGRAM, WITH SERIALOGRAPHY, LEFT LOWER EXTREMITY, POSSIBLE INTERVENTION;  Surgeon: Mitch Chan MD;  Location: [facility] OR;  Service: Vascular;  Laterality: Left;  RN PRE OP 4-23-19.  NO H & P, No Cosent  [place]    BAERVELDT GLAUCOMA IMPLANT Left 2012    WITH CATARACT EXTRACTION//DONE AT [facility]    CARDIAC CATHETERIZATION Left 05/2016    CARDIAC DEFIBRILLATOR PLACEMENT Left 11/02/2018    CATARACT EXTRACTION W/  INTRAOCULAR LENS IMPLANT Left 2012    WITH BAERVEDT//DONE AT [facility]    CATARACT EXTRACTION W/  INTRAOCULAR LENS IMPLANT Right 09/26/2018    COMPLEX ()    CB DESTRUCTION WITH CYCLO G6 Left 02/15/2017        CYST REMOVAL      DEBRIDEMENT OF FOOT  12/28/2018    Procedure: DEBRIDEMENT, FOOT;  Surgeon: Mitch Chan MD;  Location: [facility] OR;  Service: Vascular;;    DEBRIDEMENT OF FOOT  6/5/2019    Procedure: DEBRIDEMENT, FOOT;  Surgeon: Mitch Chan MD;  Location: [facility] OR;  Service: Vascular;;    EXAMINATION UNDER ANESTHESIA Left 12/5/2018    Procedure: Exam under anesthesia, left foot debridement, washout and all other indicated procedures;  Surgeon: Mitch Chan MD;  Location: [facility] OR;  Service: Vascular;   Laterality: Left;  1030AM START  RN PREOP 12/3/2018-----AICD  SEEN BY DR JAMES 12/4    EXAMINATION UNDER ANESTHESIA Left 2/13/2019    Procedure: LEFT FOOT WOUND DEBRIDEMENT, WASHOUT AND ALL OTHER INDICATED PROCEDURES;  Surgeon: Mitch Wall MD;  Location: [facility] OR;  Service: Vascular;  Laterality: Left;  requesting 1st case start  THIS CASE 1ST PER DR WALL ON 2/1/19 @ 844AM -[place]  RN PREOP 2/6/2019  HAS CARDIAC CLEARANCE    EXAMINATION UNDER ANESTHESIA Left 12/28/2018    Procedure: Exam under anesthesia, left foot debridement, left foot washout, possible left second through fifth metatarsal resection;  Surgeon: Mitch Wall MD;  Location: [facility] OR;  Service: Vascular;  Laterality: Left;  1:00pm start per julissa @ 8:58am  RN  PHONE PRE OP 12-27-18--=Pt coming from [facility] on [address]  NEED CONSENT    EXAMINATION UNDER ANESTHESIA Left 6/5/2019    Procedure: LEFT FOOT DEBRIDEMENT, WASHOUT AND ALL OTHER INDICATED PROCEDURES;  Surgeon: Mitch Wall MD;  Location: [facility] OR;  Service: Vascular;  Laterality: Left;  RN PRE OP 5-31-19------ICD------NEED CONSENT    INCISION AND DRAINAGE Left 11/14/2018    Procedure: Incision and Drainage, left lower extremity debridement, washout;  Surgeon: Mitch Wall MD;  Location: [facility] OR;  Service: Vascular;  Laterality: Left;    INCISION AND DRAINAGE Left 11/16/2018    Procedure: INCISION AND DRAINAGE;  Surgeon: Mitch Wall MD;  Location: [facility] OR;  Service: Vascular;  Laterality: Left;    INTRAOCULAR PROSTHESES INSERTION Right 9/26/2018    Procedure: INSERTION, IOL PROSTHESIS;  Surgeon: Perla Cortés MD;  Location: [facility] OR [address] [place] [address];  Service: Ophthalmology;  Laterality: Right;    PERITONEOCENTESIS N/A 3/1/2019    Procedure: PARACENTESIS, ABDOMINAL, INITIAL;  Surgeon: [facility] Diagnostic Provider;  Location: [facility] OR;  Service: Radiology;  Laterality: N/A;    PERITONEOCENTESIS N/A 3/25/2019    Procedure: PARACENTESIS, ABDOMINAL, INITIAL;   Surgeon: Dosc Diagnostic Provider;  Location: [facility] OR;  Service: Radiology;  Laterality: N/A;    PERITONEOCENTESIS N/A 7/22/2019    Procedure: PARACENTESIS, ABDOMINAL, INITIAL;  Surgeon: Dosc Diagnostic Provider;  Location: [facility] OR;  Service: Radiology;  Laterality: N/A;    PERITONEOCENTESIS N/A 8/16/2019    Procedure: PARACENTESIS, ABDOMINAL, INITIAL;  Surgeon: Dosc Diagnostic Provider;  Location: [facility] OR;  Service: Radiology;  Laterality: N/A;    PERITONEOCENTESIS N/A 9/26/2019    Procedure: PARACENTESIS, ABDOMINAL;  Surgeon: Dosc Diagnostic Provider;  [facility]: [facility];  Service: Radiology;  Laterality: N/A;    PERITONEOCENTESIS N/A 10/11/2019    Procedure: PARACENTESIS, ABDOMINAL;  Surgeon: Dosc Diagnostic Provider;  [facility];  Service: Radiology;  Laterality: N/A;    PERITONEOCENTESIS N/A 10/31/2019    Procedure: PARACENTESIS, ABDOMINAL;  Surgeon: Dosc Diagnostic Provider;  Location: [facility] OR;  Service: Radiology;  Laterality: N/A;    PERITONEOCENTESIS N/A 11/18/2019    Procedure: PARACENTESIS, ABDOMINAL;  Surgeon: Dosc Diagnostic Provider;  Location: [facility] OR;  Service: Radiology;  Laterality: N/A;    PERITONEOCENTESIS N/A 12/16/2019    Procedure: PARACENTESIS, ABDOMINAL;  Surgeon: Dosc Diagnostic Provider;  Location: [facility] OR;  Service: Radiology;  Laterality: N/A;  2PM START    PERITONEOCENTESIS N/A 2/7/2020    Procedure: PARACENTESIS, ABDOMINAL;  Surgeon: Dosc Diagnostic Provider;  Location: [facility] OR;  Service: Radiology;  Laterality: N/A;  REQUESTED 10:30A START    PERITONEOCENTESIS N/A 2/19/2020    Procedure: PARACENTESIS, ABDOMINAL;  Surgeon: Dosc Diagnostic Provider;  [facility];  Service: Radiology;  Laterality: N/A;    PERITONEOCENTESIS N/A 2/28/2020    Procedure: PARACENTESIS, ABDOMINAL;  Surgeon: Dosc Diagnostic Provider;  [facility];  Service: Radiology;  Laterality: N/A;  REQUESTED 10AM START    PHACOEMULSIFICATION OF CATARACT Right 9/26/2018    Procedure:  PHACOEMULSIFICATION, CATARACT;  Surgeon: Perla Cortés MD;  [facility];  Service: Ophthalmology;  Laterality: Right;    REPEAT ABDOMINAL PARACENTESIS N/A 2/11/2019    Procedure: PARACENTESIS, ABDOMINAL, REPEAT;  Surgeon: Dosc Diagnostic Provider;  Location: [facility] OR;  Service: Radiology;  Laterality: N/A;  1PM START    REPEAT ABDOMINAL PARACENTESIS N/A 9/12/2019    Procedure: PARACENTESIS, ABDOMINAL, REPEAT;  Surgeon: Dosc Diagnostic Provider;  Location: [facility] OR;  Service: Radiology;  Laterality: N/A;  9AM START    REPEAT ABDOMINAL PARACENTESIS N/A 12/2/2019    Procedure: PARACENTESIS, ABDOMINAL, REPEAT;  Surgeon: Dosc Diagnostic Provider;  Location: [facility] OR;  Service: Radiology;  Laterality: N/A;  3PM START    REPEAT ABDOMINAL PARACENTESIS N/A 1/10/2020    Procedure: PARACENTESIS, ABDOMINAL, REPEAT;  Surgeon: Dosc Diagnostic Provider;  Location: [facility] OR;  Service: Radiology;  Laterality: N/A;  1130AM START    REPEAT ABDOMINAL PARACENTESIS N/A 1/20/2020    Procedure: PARACENTESIS, ABDOMINAL, REPEAT;  Surgeon: Dosc Diagnostic Provider;  Location: [facility] OR;  Service: Radiology;  Laterality: N/A;  1PM START    REPEAT ABDOMINAL PARACENTESIS N/A 1/31/2020    Procedure: PARACENTESIS, ABDOMINAL, REPEAT;  Surgeon: Dosc Diagnostic Provider;  Location: [facility] OR;  Service: Radiology;  Laterality: N/A;  10AM START    REPEAT ABDOMINAL PARACENTESIS N/A 3/16/2020    Procedure: PARACENTESIS, ABDOMINAL, REPEAT;  Surgeon: Dosc Diagnostic Provider;  Location: [facility] OR;  Service: Radiology;  Laterality: N/A;  10AM START    REPEAT ABDOMINAL PARACENTESIS N/A 3/30/2020    Procedure: PARACENTESIS, ABDOMINAL, REPEAT;  Surgeon: Dosc Diagnostic Provider;  Location: [facility] OR;  Service: Radiology;  Laterality: N/A;    REPEAT ABDOMINAL PARACENTESIS N/A 4/9/2020    Procedure: PARACENTESIS, ABDOMINAL, REPEAT;  Surgeon: Dosc Diagnostic Provider;  Location: [facility] OR;  Service: Radiology;  Laterality: N/A;  1130AM START     REPEAT ABDOMINAL PARACENTESIS N/A 5/8/2020    Procedure: PARACENTESIS, ABDOMINAL, REPEAT;  Surgeon: [facility] Diagnostic Provider;  Location: [facility] OR;  Service: Radiology;  Laterality: N/A;  9AM START    REPEAT ABDOMINAL PARACENTESIS N/A 5/21/2020    Procedure: PARACENTESIS, ABDOMINAL, REPEAT;  Surgeon: [facility] Diagnostic Provider;  Location: [facility] OR;  Service: Radiology;  Laterality: N/A;  10AM START    REPEAT ABDOMINAL PARACENTESIS N/A 6/5/2020    Procedure: PARACENTESIS, ABDOMINAL, REPEAT;  Surgeon: [facility] Diagnostic Provider;  Location: [facility] OR;  Service: Radiology;  Laterality: N/A;  NOON START    REPEAT ABDOMINAL PARACENTESIS N/A 7/10/2020    Procedure: PARACENTESIS, ABDOMINAL, REPEAT;  Surgeon: [facility] Diagnostic Provider;  Location: [facility] OR;  Service: Radiology;  Laterality: N/A;  1PM START    REPEAT ABDOMINAL PARACENTESIS N/A 8/20/2020    Procedure: PARACENTESIS, ABDOMINAL, REPEAT;  Surgeon: [facility] Diagnostic Provider;  Location: [facility] OR;  Service: Radiology;  Laterality: N/A;  1PM START    REVISION OF PROCEDURE INVOLVING GLAUCOMA DRAINAGE DEVICE Left 10/2/2019    EXTRUDING BAERVELDT /WITH PERICARDIAL PATCH GRAFT ()    Right foot surgery  10/2014    TOE AMPUTATION Right     first and second    TOE AMPUTATION Left 11/16/2018    Procedure: AMPUTATION, TOES  2-5;  Surgeon: Mitch Chan MD;  Location: [facility] OR;  Service: Vascular;  Laterality: Left;    TOE AMPUTATION Left 12/5/2018    Procedure: AMPUTATION, TOE;  Surgeon: Mitch Chan MD;  Location: [facility] OR;  Service: Vascular;  Laterality: Left;  left great toe    TONSILLECTOMY       Family History   Problem Relation Age of Onset    Diabetes Mother     Heart disease Brother     No Known Problems Father     No Known Problems Sister     No Known Problems Maternal Aunt     No Known Problems Maternal Uncle     No Known Problems Paternal Aunt     No Known Problems Paternal Uncle     No Known Problems Maternal Grandmother     No Known  Problems Maternal Grandfather     No Known Problems Paternal Grandmother     No Known Problems Paternal Grandfather     Anemia Neg Hx     Arrhythmia Neg Hx     Asthma Neg Hx     Clotting disorder Neg Hx     Fainting Neg Hx     Heart attack Neg Hx     Heart failure Neg Hx     Hyperlipidemia Neg Hx     Hypertension Neg Hx     Stroke Neg Hx     Atrial Septal Defect Neg Hx     Amblyopia Neg Hx     Blindness Neg Hx     Glaucoma Neg Hx     Macular degeneration Neg Hx     Retinal detachment Neg Hx     Strabismus Neg Hx      Social History     Socioeconomic History    Marital status: Single     Spouse name: Not on file    Number of children: Not on file    Years of education: 14    Highest education level: Not on file   Occupational History    Not on file   Social Needs    Financial resource strain: Not on file    Food insecurity     Worry: Not on file     Inability: Not on file    Transportation needs     Medical: Not on file     Non-medical: Not on file   Tobacco Use    Smoking status: Former Smoker     Packs/day: 1.00     Years: 3.00     Pack years: 3.00     Types: Cigarettes     Quit date: 1984     Years since quittin.1    Smokeless tobacco: Never Used   Substance and Sexual Activity    Alcohol use: Not Currently     Alcohol/week: 1.0 standard drinks     Types: 1 Cans of beer per week     Comment: rarely    Drug use: No    Sexual activity: Not Currently     Partners: Female   Lifestyle    Physical activity     Days per week: Not on file     Minutes per session: Not on file    Stress: Not on file   Relationships    Social connections     Talks on phone: Not on file     Gets together: Not on file     Attends Yazidi service: Not on file     Active member of club or organization: Not on file     Attends meetings of clubs or organizations: Not on file     Relationship status: Not on file   Other Topics Concern    Not on file   Social History Narrative    Not on file  "      Current Outpatient Medications:     allopurinoL (ZYLOPRIM) 300 MG tablet, TAKE 1 TABLET(300 MG) BY MOUTH EVERY DAY, Disp: 30 tablet, Rfl: 3    aspirin (ECOTRIN) 81 MG EC tablet, Take 81 mg by mouth once daily., Disp: , Rfl:     bisacodyl (DULCOLAX) 5 mg EC tablet, Take 5 mg by mouth daily as needed for Constipation., Disp: , Rfl:     brimonidine 0.2% (ALPHAGAN) 0.2 % Drop, Place 1 drop into both eyes 3 (three) times daily., Disp: 10 mL, Rfl: 11    carvediloL (COREG) 3.125 MG tablet, TAKE 1 TABLET(3.125 MG) BY MOUTH TWICE DAILY, Disp: 60 tablet, Rfl: 1    cefadroxil (DURICEF) 1 gram tablet, Take 1 tablet (1 g total) by mouth 2 (two) times daily. for 10 days, Disp: 20 tablet, Rfl: 0    ciprofloxacin HCl (CIPRO) 500 MG tablet, Take 1 tablet (500 mg total) by mouth every 12 (twelve) hours. for 10 days, Disp: 20 tablet, Rfl: 0    dorzolamide-timolol 2-0.5% (COSOPT) 22.3-6.8 mg/mL ophthalmic solution, Place 1 drop into both eyes 3 (three) times daily., Disp: 1 Bottle, Rfl: 11    ezetimibe (ZETIA) 10 mg tablet, TAKE 1 TABLET(10 MG) BY MOUTH EVERY DAY, Disp: 90 tablet, Rfl: 0    fish oil-omega-3 fatty acids 300-1,000 mg capsule, Take 1 capsule by mouth 2 (two) times daily., Disp: 60 capsule, Rfl: 3    gabapentin (NEURONTIN) 100 MG capsule, TAKE 2 CAPSULES(200 MG) BY MOUTH EVERY EVENING, Disp: 60 capsule, Rfl: 1    insulin degludec-liraglutide (XULTOPHY 100/3.6) 100 unit-3.6 mg /mL (3 mL) InPn, Inject 34 Units into the skin once daily., Disp: 15 Syringe, Rfl: 1    MULTIVIT,THER IRON,CA,FA & MIN (MULTIVITAMIN) Tab, Take 1 tablet by mouth every morning. , Disp: , Rfl:     coenzyme Q10 100 mg capsule, Take 100 mg by mouth every morning., Disp: , Rfl:     pen needle, diabetic 31 gauge x 1/4" Ndle, Use as directed (Patient not taking: Reported on 9/2/2020), Disp: 100 each, Rfl: 11    torsemide (DEMADEX) 20 MG Tab, Take 4 tablets (80 mg total) by mouth 2 (two) times daily., Disp: 240 tablet, Rfl: 2  No " current facility-administered medications for this visit.     Facility-Administered Medications Ordered in Other Visits:     clindamycin 900 MG/50 ML D5W 900 mg/50 mL IVPB 900 mg, 900 mg, Intravenous, Q8H, Mitch Chan MD, 900 mg at 06/05/19 1407    lactated ringers infusion, , Intravenous, Continuous, Tam Reynolds MD    lidocaine (PF) 10 mg/ml (1%) injection 10 mg, 1 mL, Intradermal, Once, Tam Reynolds MD    REVIEW OF SYSTEMS:  General: No fevers or chills; ENT: No sore throat; Allergy and Immunology: no persistent infections; Hematological and Lymphatic: No history of bleeding or easy bruising; Endocrine: negative; Respiratory: no cough, shortness of breath, or wheezing; Cardiovascular: no chest pain or dyspnea on exertion; Gastrointestinal: no abdominal pain/back, change in bowel habits, or bloody stools; Genito-Urinary: no dysuria, trouble voiding, or hematuria; Musculoskeletal: negative; Neurological: no TIA or stroke symptoms; Psychiatric: no nervousness, anxiety or depression.    PHYSICAL EXAM:                General appearance:  Alert, well-appearing, and in no distress.  Oriented to person, place, and time                    Neurological: Normal speech, no focal findings noted; CN II - XII grossly intact. RLE with sensation to light touch, LLE with sensation to light touch.            Musculoskeletal: Digits/nail without cyanosis/clubbing.  Strength 5/5 BLE.                    Neck: Supple, no significant adenopathy                  Chest:  Clear to auscultation, no wheezes, rales or rhonchi, symmetric air entry. No use of accessory muscles               Cardiac: Normal rate and regular rhythm, S1 and S2 normal            Abdomen: Soft, nontender, nondistended, no masses or organomegaly, no hernia     No rebound tenderness noted; bowel sounds normal     No groin adenopathy      Extremities:   2+ R femoral pulse, 2+ L femoral pulse     doppler+ R popliteal pulse, doppler+ L popliteal  pulse     doppler+ R PT pulse, doppler+ L PT pulse     doppler+ R DP pulse, doppler+ L DP pulse     1+ RLE edema, 2+ LLE edema    Skin: RLE with medial ankle, and lower foot medial wounds, bleeding present, no infection or drainage; LLE with minimal brawny edema, improved/smaller foot wound with markedly improved granulation tissue, no odor, no purulence or erythema    LAB RESULTS:  No results found for: CBC  Lab Results   Component Value Date    LABPROT 11.2 05/31/2019    INR 1.1 05/31/2019     Lab Results   Component Value Date     08/25/2020    K 4.2 08/25/2020     08/25/2020    CO2 26 08/25/2020     (H) 08/25/2020    BUN 44 (H) 08/25/2020    CREATININE 1.3 08/25/2020    CALCIUM 8.3 (L) 08/25/2020    ANIONGAP 8 08/25/2020    EGFRNONAA 58 (A) 08/25/2020     Lab Results   Component Value Date    WBC 10.60 08/25/2020    RBC 4.01 (L) 08/25/2020    HGB 11.8 (L) 08/25/2020    HCT 37.4 (L) 08/25/2020    MCV 93 08/25/2020    MCH 29.4 08/25/2020    MCHC 31.6 (L) 08/25/2020    RDW 17.6 (H) 08/25/2020     08/25/2020    MPV 9.7 08/25/2020    GRAN 8.9 (H) 08/25/2020    GRAN 83.5 (H) 08/25/2020    LYMPH 0.5 (L) 08/25/2020    LYMPH 4.9 (L) 08/25/2020    MONO 0.7 08/25/2020    MONO 6.7 08/25/2020    EOS 0.3 08/25/2020    BASO 0.08 08/25/2020    EOSINOPHIL 3.1 08/25/2020    BASOPHIL 0.8 08/25/2020    DIFFMETHOD Automated 08/25/2020     .  Lab Results   Component Value Date    HGBA1C 8.3 (H) 06/11/2020       IMAGING:  All pertinent imaging has been reviewed and interpreted independently.    BLE arterial US 1/2019: No focal stenosis    5/16/19: BLE with no HD significant stenosis, SIDRA 0.8/1.46, left ankle pressures 60 and 171    7/29/19: BLE arterial US with approx 50% R TPT stenosis, SIDRA 0.8; LLE showing no HD significant stenosis, SIDRA 0.66    11/4/19: SIDRA 0.4/0.4, DP vessel NC bilaterally, R toe waveform normal  Right lower extremity arterial ultrasound shows no hemodynamically significant stenosis.   Pressures indicate moderate arterial occlusive disease.  Left lower extremity arterial ultrasound shows no hemodynamically significant stenosis.  Pressures indicate moderate arterial occlusive disease.    5/2020:  1. Right lower extremity pressures and waveforms indicate moderate arterial occlusive disease.  2. Left lower extremity pressures and waveforms indicate no hemodynamically significant arterial occlusive disease.    7/2020:  1. Right lower extremity arterial ultrasound shows approximately 50% TP trunk stenosis and an occluded PT artery.  There is no hemodynamically significant stenosis.  Pressures indicate severe arterial occlusive disease.  2. Left lower extremity arterial ultrasound shows TP trunk hemodynamically significant stenosis and an occluded PT artery.  Pressures indicate severe arterial occlusive disease.    1. Right lower extremity pressures and waveforms indicate moderate to severe arterial occlusive disease.  2. Left lower extremity pressures and waveforms indicate moderate to severe arterial occlusive disease.    IMP/PLAN:  60 y.o. male with   Patient Active Problem List   Diagnosis    Epiretinal membrane, both eyes    Nuclear sclerotic cataract of right eye    Pseudophakia, left eye    Ptosis, left eyelid    DM type 2 with diabetic peripheral neuropathy    HLD (hyperlipidemia)    Neovascular glaucoma of both eyes    Tophaceous gout    Essential hypertension    Coronary artery disease involving native coronary artery of native heart without angina pectoris    Neovascular glaucoma of left eye, severe stage    Statin myopathy    Neovascular glaucoma, right eye, mild stage    Left leg cellulitis    PVD (peripheral vascular disease)    Right wrist pain    Multiple open wounds of lower leg    Hepatosplenomegaly    Type 2 diabetes mellitus with left diabetic foot ulcer    Open wound of left foot    Cellulitis of left lower extremity    Diabetic foot infection    Other ascites     Severe malnutrition    Goals of care, counseling/discussion    Alteration in skin integrity    MDR Acinetobacter baumannii infection    Takes dietary supplements    Intertriginous dermatitis associated with moisture    Debility    Infection due to multidrug-resistant Pseudomonas aeruginosa    Subacute osteomyelitis of left foot    Ascites    Acute on chronic combined systolic and diastolic congestive heart failure    Stage 3 chronic kidney disease    Atherosclerosis of left lower extremity with ulceration of midfoot    CKD stage 3 due to type 2 diabetes mellitus    Azotemia    Hyponatremia    Anemia due to multiple mechanisms    Persistent proteinuria    Hypokalemia    Open wound of foot, left, subsequent encounter    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    Ischemic cardiomyopathy    Status post abdominal paracentesis    Diabetic ulcer of right lower leg    Diabetic ulcer of toe of right foot associated with type 2 diabetes mellitus, with bone involvement without evidence of necrosis    being managed by PCP and specialists who is here today for evaluation of L foot wound.    -Improving L foot wound improved s/p debridement 2/13/19, multifactorial due to diabetes, PVD and venous insufficiency with improvement in granulation tissue on Abx due to multidrug resistent bacteria in the past s/p debridements and L tibial angioplasty x 2 with improvement in wound +granulation tissue s/p debridement 6/5/19 and W[place] placement; anticipate healing in coming months  -Cont ID rec Abx regimen  -Recommend continued wound care center care - seeing Dr. Davis; may benefit from hyperbaric O2 therapy  -R ankle / foot wounds - cont wound care and if no significant improvement in 4 weeks - will plan RLE angiogram/revascularization  -Rec BLE Xeroform and dry kerlix wraps twice daily to shin regions with elevation of LLE above level of the heart  -Low sodium diet  -Strict glycemic control  -RTC 1 mo  for continued evaluation     I spent 13 minutes evaluating this patient and greater than 50% of the time was spent counseling, coordinator care and discussing the plan of care.  All questions were answered and patient stated understanding with agreement with the above treatment plan.    Mitch Chan MD [facility]  Vascular and Endovascular Surgery

## 2020-09-02 NOTE — LETTER
September 2, 2020        Rubén Davis MD  [address] [facility] [address]             [facility]  [address]  [facility] [address]  Phone: [phone]  Fax: [phone]   Patient: Marvin Ray   MR Number: [id number]   YOB: 1960   Date of Visit: 9/2/2020       Dear Dr. Davis:    Thank you for referring Marvin Ray to me for evaluation. Below are the relevant portions of my assessment and plan of care.            If you have questions, please do not hesitate to call me. I look forward to following Marvin along with you.    Sincerely,      Mitch Chan MD           CC  No Recipients

## 2020-09-02 NOTE — PATIENT INSTRUCTIONS
Peripheral Artery Disease (PAD)     Peripheral artery disease (PAD) occurs when the arteries that carry blood to the limbs are narrowed or blocked. This is usually due to a buildup of a fatty substance called plaque in the walls of the arteries.  PAD most often affects the arteries in the legs. When these arteries are narrowed or blocked, blood flow to the legs is reduced. This can cause leg and foot pain and other symptoms. If severe enough, reduced blood flow to the legs can lead to tissue death (gangrene) and the loss of a toe, foot, or leg. Having PAD also makes it more likely that arteries in other body areas are blocked. For instance, arteries that carry blood to the heart or brain may be affected. This raises the chances of heart attack and stroke.  Risk factors  Certain factors can make PAD more likely. They include:  · Smoking  · Diabetes  · High blood pressure  · Unhealthy cholesterol levels  · Obesity  · Inactive lifestyle  · Older age  · Family history of PAD  Symptoms  Many people with PAD have no symptoms. If symptoms do occur, they can include:  · Pain in the muscles of the calves, thighs or hips that gets worse with activity and better with rest (intermittent claudication)  · Achy, tired, or heavy feeling in the legs  · Weakness, numbness, tingling, or loss of feeling in the legs  · Changes in skin color of the legs  · Sores on the legs and feet  · Cold leg, feet, or toes  · Pain the feet or toes even when lying down (rest pain)  Home care  PAD is a chronic (lifelong) condition. Treatment is focused on managing your condition and lowering your health risks. This may include doing the following:  · If you smoke, quit. This helps prevent further damage to your arteries and lowers your health risks. Ask your provider about medicines or products that can help you quit smoking. Also consider joining a stop-smoking program or support group.  · Be more active. This helps you lose weight and manage  problems such as high blood pressure and unhealthy cholesterol levels. Start a walking program if advised to by your provider. Your provider may also help you form a safe exercise program that is right for your needs.  · Make healthy eating changes. This includes eating less fat, salt, and sugar.  · Take medicines for high blood pressure, unhealthy cholesterol levels, and diabetes as directed.  · Have your blood pressure and cholesterol levels checked as often as directed.  · If you have diabetes, try to keep your blood sugar well controlled. Test your blood sugar as directed.  · If you are overweight, talk to your provider about forming a weight-loss plan.  · Watch for cuts, scrapes, or open sores on your feet. Poor blood flow to the feet may slow healing and increase the risk of infection from these problems.   Follow-up care  Follow up with your healthcare provider, or as advised. If imaging tests such as ultrasound were done, they will be reviewed by a doctor. You will be told the results and any new findings that may affect your care.  When to seek medical advice   Call your healthcare provider right away if any of these occur:  · Sudden severe pain in the legs or feet  · Sudden cold, pale or blue color in the legs or feet  · Weakness or numbness in the legs or feet that worsens  · Any sore or wound in the legs or feet that wont heal  · Weak pulse in your legs or feet  Know the Signs of Heart Attack and Stroke  People with PAD are at high risk for heart attack and stroke. Knowing the signs of these problems can help you protect your health and get help when you need it. Call 911 right away if you have any of the following:  Signs of a Heart Attack  · Chest discomfort, such as pain, aching, tightness, or pressure that lasts more than a few minutes, or that comes and goes  · Pain or discomfort in the arms, back, shoulders, neck, or jaw  · Shortness of breath  · Sweating (often a cold, clammy  sweat)  · Nausea  · Lightheadedness  Signs of a Stroke  · Sudden numbness or weakness of the face, arms, or legs, especially on one side  · Sudden confusion or trouble speaking or understanding  · Sudden trouble seeing in one or both eyes  · Sudden trouble walking, dizziness, or loss of balance  · Sudden, severe headache with no known cause   Date Last Reviewed: 9/21/2015  © 0623-2474 [employer]. [address] [place] [address], [place] [address]. All rights reserved. This information is not intended as a substitute for professional medical care. Always follow your healthcare professional's instructions.          Wound Care  Taking proper care of your wound will help it heal. Your healthcare provider may show you how to clean and dress the wound. He or she will also explain how to tell if the wound is healing normally. If you are unsure of how to take care of the wound, be sure to clarify what dressing to use and how often you should change the bandages. Here are the basic steps.     A wound that's not healing normally may be dark in color or have white streaks.   Wash your hands  Tips for washing your hands include:  · Use liquid soap and lather for 2 minutes. Scrub between your fingers and under your nails.  · Rinse with warm water, keeping your fingers pointing down.  · Use a paper towel to dry your hands and to turn off the faucet.  Remove the used dressing  Here are suggestions for removing the dressing:  · If dressing changes cause you pain, be sure to take your pain medicine as prescribed by your healthcare provider 30 minutes before dressing changes.  · Set up your supplies.  · Put on disposable gloves if youre dressing a wound for someone else or your wound is infected.  · Loosen the tape by pulling gently toward the wound.  · Gently take off the old dressing. If the dressing is stuck to the wound, moisten it with saline (if available) or clean water.  · If you have a drain or tube in the wound, be  careful not to pull on it.  · Remove the dressing 1 layer at a time and put it in a plastic bag.  · Remove your gloves.  Inspect and dress the wound  Check the wound carefully:  · Each time you change the dressing, inspect the wound carefully to be sure its healing normally by making sure your wound appears to be pink and moist, and is free of infection.    · Wash your hands again. Put on a new pair of gloves.  · Clean and dress the wound as directed by your healthcare provider or nurse. Do not put anything in the wound that is not prescribed or directed by your healthcare provider. If you have a drain or tube, be careful not to pull on it. Make sure to secure the drain or tube as well.  · Put all supplies in a plastic bag. Seal the bag and put it in the trash.  · Be sure to wash your hands again.  Call your healthcare provider  Call your healthcare provider if you see any of the following signs of a problem:  · Bleeding that soaks the dressing  · Pink fluid weeping from the wound  · Increased drainage or drainage that is yellow, yellow-green, or foul-smelling  · Increased swelling or pain, or redness or swelling in the skin around the wound  · A change in the color of the wound, or if streaks develop in a direction away from the wound  · The area between any stitches opens up  · An increase in the size of the wound  · A fever of 100.4°F (38°C) or higher, or as directed by your healthcare provider  · Chills, increased fatigue, or a loss of appetite      Date Last Reviewed: 7/30/2015  © 1347-2635 [employer] [address] [place] [address], [place] [address]. All rights reserved. This information is not intended as a substitute for professional medical care. Always follow your healthcare professional's instructions.

## 2020-09-02 NOTE — PROGRESS NOTES
Date: 9/2/20    Indications: Patient is a 60 y.o. male with R foot and ankle wound.  The patient would benefit from debridement for limb salvage.    Pre-operative Diagnosis:    1. Atherosclerosis RLE with foot wound including skin and subcutaneous tissue  2. HTN  3. HLD  4. DM  5. Cirrhosis with ascites    Post-operative Diagnosis:   1. Same    Procedure:   1. R foot wound debridement down to muscle. Wound measurement, 1 cm x 2 cm x 1 cm     2. R ankle wound debridement including subcutaneous tissue.    Wound measurement, 3 cm x 2 cm x 0.5 cm     Surgeon: Mitch Chan MD    Assistants: None    Anesthesia: None    Estimate Blood Loss:  Less than 5mL            Specimens: None               Complications:  None; patient tolerated the procedure well.     Procedure in detail: The patient was seen in the procedure room and placed in the supine position on the treatment table. Attention was directed to the ulcer which was located on the right foot and ankle. The wound was covered with dry fibrin and a mild callused rim. No acute signs of infection were noted. The wound was non-tender. Utilizing a scissor, I debrided the wound full-thickness through skin to excise nonviable tissue inside the wound margins.  The patient tolerated well. Because of a lack of sensation, anesthesia was not necessary.There was appropriate bleeding with debridement which was well controlled with direct pressure. The wound was then dressed with wet to dry dressing. The patient tolerated the procedure well.

## 2020-09-04 ENCOUNTER — HOSPITAL ENCOUNTER (OUTPATIENT)
Facility: HOSPITAL | Age: 60
Discharge: HOME | End: 2020-09-04
Attending: RADIOLOGY | Admitting: RADIOLOGY
Payer: MEDICAID

## 2020-09-04 ENCOUNTER — HOSPITAL ENCOUNTER (OUTPATIENT)
Dept: INTERVENTIONAL RADIOLOGY/VASCULAR | Facility: HOSPITAL | Age: 60
Discharge: HOME | End: 2020-09-04
Attending: FAMILY MEDICINE
Payer: MEDICAID

## 2020-09-04 VITALS — DIASTOLIC BLOOD PRESSURE: 66 MMHG | SYSTOLIC BLOOD PRESSURE: 131 MMHG

## 2020-09-04 DIAGNOSIS — R18.8 OTHER ASCITES: ICD-10-CM

## 2020-09-04 PROCEDURE — 49083 ABD PARACENTESIS W/IMAGING: CPT

## 2020-09-04 PROCEDURE — 49083 IR PARACENTESIS NO IMAGING: ICD-10-PCS | Mod: ,,, | Performed by: RADIOLOGY

## 2020-09-04 PROCEDURE — 49083 ABD PARACENTESIS W/IMAGING: CPT | Mod: ,,, | Performed by: RADIOLOGY

## 2020-09-04 NOTE — PLAN OF CARE
Dr Kong discharging patient from IR after 5200 mL out from paracentesis.  No labs today.  BP well maintained during procedure.  Patient denies pain.

## 2020-09-04 NOTE — DISCHARGE SUMMARY
Radiology Discharge Summary      Hospital Course: No complications    Admit Date: 9/4/2020  Discharge Date: 09/04/2020     Instructions Given to Patient: Yes  Diet: Resume prior diet  Activity: activity as tolerated    Description of Condition on Discharge: Stable  Vital Signs (Most Recent): BP: 131/66 (09/04/20 1218)    Discharge Disposition: Home    Discharge Diagnosis: CHF, CKD s/p paracentesis     Follow-up: with primary MD Vin Kong MD  Staff Radiologist  Department of Radiology  Pager: [phone]

## 2020-09-04 NOTE — H&P
Radiology History & Physical      SUBJECTIVE:     Chief Complaint: ascites    History of Present Illness:  Marvin Ray is a 60 y.o. male who presents for paracentesis  Past Medical History:   Diagnosis Date    Arthritis     Cellulitis     CKD (chronic kidney disease), stage III     Coronary artery disease     Diabetes mellitus     Diabetic retinopathy     Diabetic ulcer of left foot     Glaucoma     Gout     Hyperlipemia     Hypertension     ICD (implantable cardioverter-defibrillator) in place 11/02/2018    Left chest    Non-pressure chronic ulcer of other part of left foot with fat layer exposed 10/23/2018    PVD (peripheral vascular disease)     Type 2 diabetes mellitus with left diabetic foot ulcer 10/29/2018    Unsteady gait     uses a wheelchair     Past Surgical History:   Procedure Laterality Date    AHMED GLAUCOMA IMPLANT Left 2011    DONE AT [facility]    AORTOGRAPHY WITH SERIALOGRAPHY Left 4/30/2019    Procedure: AORTOGRAM, WITH SERIALOGRAPHY, LEFT LOWER EXTREMITY, POSSIBLE INTERVENTION;  Surgeon: Mitch Chan MD;  Location: [facility] OR;  Service: Vascular;  Laterality: Left;  RN PRE OP 4-23-19.  NO H & P, No Cosent  [place]    BAERVELDT GLAUCOMA IMPLANT Left 2012    WITH CATARACT EXTRACTION//DONE AT [facility]    CARDIAC CATHETERIZATION Left 05/2016    CARDIAC DEFIBRILLATOR PLACEMENT Left 11/02/2018    CATARACT EXTRACTION W/  INTRAOCULAR LENS IMPLANT Left 2012    WITH BAERVEDT//DONE AT [facility]    CATARACT EXTRACTION W/  INTRAOCULAR LENS IMPLANT Right 09/26/2018    COMPLEX ()    CB DESTRUCTION WITH CYCLO G6 Left 02/15/2017        CYST REMOVAL      DEBRIDEMENT OF FOOT  12/28/2018    Procedure: DEBRIDEMENT, FOOT;  Surgeon: Mitch Chan MD;  Location: [facility] OR;  Service: Vascular;;    DEBRIDEMENT OF FOOT  6/5/2019    Procedure: DEBRIDEMENT, FOOT;  Surgeon: Mitch Chan MD;  Location: [facility] OR;  Service: Vascular;;    EXAMINATION UNDER  ANESTHESIA Left 12/5/2018    Procedure: Exam under anesthesia, left foot debridement, washout and all other indicated procedures;  Surgeon: Mitch Wall MD;  Location: [facility] OR;  Service: Vascular;  Laterality: Left;  1030AM START  RN PREOP 12/3/2018-----AICD  SEEN BY DR JAMES 12/4    EXAMINATION UNDER ANESTHESIA Left 2/13/2019    Procedure: LEFT FOOT WOUND DEBRIDEMENT, WASHOUT AND ALL OTHER INDICATED PROCEDURES;  Surgeon: Mitch Wall MD;  Location: [facility] OR;  Service: Vascular;  Laterality: Left;  requesting 1st case start  THIS CASE 1ST PER DR WALL ON 2/1/19 @ 844AM -[place]  RN PREOP 2/6/2019  HAS CARDIAC CLEARANCE    EXAMINATION UNDER ANESTHESIA Left 12/28/2018    Procedure: Exam under anesthesia, left foot debridement, left foot washout, possible left second through fifth metatarsal resection;  Surgeon: Mitch Wall MD;  Location: [facility] OR;  Service: Vascular;  Laterality: Left;  1:00pm start per julissa @ 8:58am  RN  PHONE PRE OP 12-27-18--=Pt coming from [facility] on [facility]  NEED CONSENT    EXAMINATION UNDER ANESTHESIA Left 6/5/2019    Procedure: LEFT FOOT DEBRIDEMENT, WASHOUT AND ALL OTHER INDICATED PROCEDURES;  Surgeon: Mitch Wall MD;  Location: [facility] OR;  Service: Vascular;  Laterality: Left;  RN PRE OP 5-31-19------ICD------NEED CONSENT    INCISION AND DRAINAGE Left 11/14/2018    Procedure: Incision and Drainage, left lower extremity debridement, washout;  Surgeon: Mitch Wall MD;  Location: [facility] OR;  Service: Vascular;  Laterality: Left;    INCISION AND DRAINAGE Left 11/16/2018    Procedure: INCISION AND DRAINAGE;  Surgeon: Mitch Wall MD;  Location: [facility] OR;  Service: Vascular;  Laterality: Left;    INTRAOCULAR PROSTHESES INSERTION Right 9/26/2018    Procedure: INSERTION, IOL PROSTHESIS;  Surgeon: Perla Cortés MD;  Location: [facility] OR [address] [place] [address];  Service: Ophthalmology;  Laterality: Right;    PERITONEOCENTESIS N/A 3/1/2019    Procedure:  PARACENTESIS, ABDOMINAL, INITIAL;  Surgeon: Dosc Diagnostic Provider;  Location: [facility] OR;  Service: Radiology;  Laterality: N/A;    PERITONEOCENTESIS N/A 3/25/2019    Procedure: PARACENTESIS, ABDOMINAL, INITIAL;  Surgeon: Dosc Diagnostic Provider;  Location: [facility] OR;  Service: Radiology;  Laterality: N/A;    PERITONEOCENTESIS N/A 7/22/2019    Procedure: PARACENTESIS, ABDOMINAL, INITIAL;  Surgeon: Dosc Diagnostic Provider;  Location: [facility] OR;  Service: Radiology;  Laterality: N/A;    PERITONEOCENTESIS N/A 8/16/2019    Procedure: PARACENTESIS, ABDOMINAL, INITIAL;  Surgeon: Dosc Diagnostic Provider;  Location: [facility] OR;  Service: Radiology;  Laterality: N/A;    PERITONEOCENTESIS N/A 9/26/2019    Procedure: PARACENTESIS, ABDOMINAL;  Surgeon: Dosc Diagnostic Provider;  [facility]: [facility];  Service: Radiology;  Laterality: N/A;    PERITONEOCENTESIS N/A 10/11/2019    Procedure: PARACENTESIS, ABDOMINAL;  Surgeon: Dosc Diagnostic Provider;  [facility]: [facility];  Service: Radiology;  Laterality: N/A;    PERITONEOCENTESIS N/A 10/31/2019    Procedure: PARACENTESIS, ABDOMINAL;  Surgeon: Dosc Diagnostic Provider;  [facility]: [facility];  Service: Radiology;  Laterality: N/A;    PERITONEOCENTESIS N/A 11/18/2019    Procedure: PARACENTESIS, ABDOMINAL;  Surgeon: Dosc Diagnostic Provider;  [facility];  Service: Radiology;  Laterality: N/A;    PERITONEOCENTESIS N/A 12/16/2019    Procedure: PARACENTESIS, ABDOMINAL;  Surgeon: Dosc Diagnostic Provider;  [facility]: [facility];  Service: Radiology;  Laterality: N/A;  2PM START    PERITONEOCENTESIS N/A 2/7/2020    Procedure: PARACENTESIS, ABDOMINAL;  Surgeon: Dosc Diagnostic Provider;  Location: [facility] OR;  Service: Radiology;  Laterality: N/A;  REQUESTED 10:30A START    PERITONEOCENTESIS N/A 2/19/2020    Procedure: PARACENTESIS, ABDOMINAL;  Surgeon: Dosc Diagnostic Provider;  Location: [facility] OR;  Service: Radiology;  Laterality: N/A;    PERITONEOCENTESIS N/A 2/28/2020    Procedure:  PARACENTESIS, ABDOMINAL;  Surgeon: Dosc Diagnostic Provider;  Location: [facility] OR;  Service: Radiology;  Laterality: N/A;  REQUESTED 10AM START    PHACOEMULSIFICATION OF CATARACT Right 9/26/2018    Procedure: PHACOEMULSIFICATION, CATARACT;  Surgeon: Perla Cortés MD;  [facility];  Service: Ophthalmology;  Laterality: Right;    REPEAT ABDOMINAL PARACENTESIS N/A 2/11/2019    Procedure: PARACENTESIS, ABDOMINAL, REPEAT;  Surgeon: Dosc Diagnostic Provider;  Location: [facility] OR;  Service: Radiology;  Laterality: N/A;  1PM START    REPEAT ABDOMINAL PARACENTESIS N/A 9/12/2019    Procedure: PARACENTESIS, ABDOMINAL, REPEAT;  Surgeon: Dosc Diagnostic Provider;  Location: [facility] OR;  Service: Radiology;  Laterality: N/A;  9AM START    REPEAT ABDOMINAL PARACENTESIS N/A 12/2/2019    Procedure: PARACENTESIS, ABDOMINAL, REPEAT;  Surgeon: Dosc Diagnostic Provider;  Location: [facility] OR;  Service: Radiology;  Laterality: N/A;  3PM START    REPEAT ABDOMINAL PARACENTESIS N/A 1/10/2020    Procedure: PARACENTESIS, ABDOMINAL, REPEAT;  Surgeon: Dosc Diagnostic Provider;  Location: [facility] OR;  Service: Radiology;  Laterality: N/A;  1130AM START    REPEAT ABDOMINAL PARACENTESIS N/A 1/20/2020    Procedure: PARACENTESIS, ABDOMINAL, REPEAT;  Surgeon: Dos Diagnostic Provider;  Location: [facility] OR;  Service: Radiology;  Laterality: N/A;  1PM START    REPEAT ABDOMINAL PARACENTESIS N/A 1/31/2020    Procedure: PARACENTESIS, ABDOMINAL, REPEAT;  Surgeon: Dosc Diagnostic Provider;  Location: [facility] OR;  Service: Radiology;  Laterality: N/A;  10AM START    REPEAT ABDOMINAL PARACENTESIS N/A 3/16/2020    Procedure: PARACENTESIS, ABDOMINAL, REPEAT;  Surgeon: Dosc Diagnostic Provider;  Location: [facility] OR;  Service: Radiology;  Laterality: N/A;  10AM START    REPEAT ABDOMINAL PARACENTESIS N/A 3/30/2020    Procedure: PARACENTESIS, ABDOMINAL, REPEAT;  Surgeon: Dosc Diagnostic Provider;  Location: [facility] OR;  Service: Radiology;  Laterality: N/A;     REPEAT ABDOMINAL PARACENTESIS N/A 4/9/2020    Procedure: PARACENTESIS, ABDOMINAL, REPEAT;  Surgeon: [facility] Diagnostic Provider;  Location: [facility] OR;  Service: Radiology;  Laterality: N/A;  1130AM START    REPEAT ABDOMINAL PARACENTESIS N/A 5/8/2020    Procedure: PARACENTESIS, ABDOMINAL, REPEAT;  Surgeon: Dosc Diagnostic Provider;  Location: [facility] OR;  Service: Radiology;  Laterality: N/A;  9AM START    REPEAT ABDOMINAL PARACENTESIS N/A 5/21/2020    Procedure: PARACENTESIS, ABDOMINAL, REPEAT;  Surgeon: Dos Diagnostic Provider;  Location: [facility] OR;  Service: Radiology;  Laterality: N/A;  10AM START    REPEAT ABDOMINAL PARACENTESIS N/A 6/5/2020    Procedure: PARACENTESIS, ABDOMINAL, REPEAT;  Surgeon: [facility] Diagnostic Provider;  Location: [facility] OR;  Service: Radiology;  Laterality: N/A;  NOON START    REPEAT ABDOMINAL PARACENTESIS N/A 7/10/2020    Procedure: PARACENTESIS, ABDOMINAL, REPEAT;  Surgeon: Dos Diagnostic Provider;  Location: [facility] OR;  Service: Radiology;  Laterality: N/A;  1PM START    REPEAT ABDOMINAL PARACENTESIS N/A 8/20/2020    Procedure: PARACENTESIS, ABDOMINAL, REPEAT;  Surgeon: [facility] Diagnostic Provider;  Location: [facility] OR;  Service: Radiology;  Laterality: N/A;  1PM START    REVISION OF PROCEDURE INVOLVING GLAUCOMA DRAINAGE DEVICE Left 10/2/2019    EXTRUDING BAERVELDT /WITH PERICARDIAL PATCH GRAFT ()    Right foot surgery  10/2014    TOE AMPUTATION Right     first and second    TOE AMPUTATION Left 11/16/2018    Procedure: AMPUTATION, TOES  2-5;  Surgeon: Mitch Chan MD;  Location: [facility] OR;  Service: Vascular;  Laterality: Left;    TOE AMPUTATION Left 12/5/2018    Procedure: AMPUTATION, TOE;  Surgeon: Mitch Chan MD;  Location: [facility] OR;  Service: Vascular;  Laterality: Left;  left great toe    TONSILLECTOMY         Home Meds:   Prior to Admission medications    Medication Sig Start Date End Date Taking? Authorizing Provider   allopurinoL (ZYLOPRIM) 300 MG tablet  "TAKE 1 TABLET(300 MG) BY MOUTH EVERY DAY 6/15/20   Rubén Davis MD   aspirin (ECOTRIN) 81 MG EC tablet Take 81 mg by mouth once daily.    Historical Provider, MD   bisacodyl (DULCOLAX) 5 mg EC tablet Take 5 mg by mouth daily as needed for Constipation.    Historical Provider, MD   brimonidine 0.2% (ALPHAGAN) 0.2 % Drop Place 1 drop into both eyes 3 (three) times daily. 12/12/19   Perla Cortés MD   carvediloL (COREG) 3.125 MG tablet TAKE 1 TABLET(3.125 MG) BY MOUTH TWICE DAILY 8/17/20   Rubén Davis MD   cefadroxil (DURICEF) 1 gram tablet Take 1 tablet (1 g total) by mouth 2 (two) times daily. for 10 days 8/24/20 9/3/20  Dixon West MD   ciprofloxacin HCl (CIPRO) 500 MG tablet Take 1 tablet (500 mg total) by mouth every 12 (twelve) hours. for 10 days 8/24/20 9/3/20  Dixon West MD   coenzyme Q10 100 mg capsule Take 100 mg by mouth every morning.    Historical Provider, MD   dorzolamide-timolol 2-0.5% (COSOPT) 22.3-6.8 mg/mL ophthalmic solution Place 1 drop into both eyes 3 (three) times daily. 12/12/19   Perla Cortés MD   ezetimibe (ZETIA) 10 mg tablet TAKE 1 TABLET(10 MG) BY MOUTH EVERY DAY 7/16/20   Rubén Davis MD   fish oil-omega-3 fatty acids 300-1,000 mg capsule Take 1 capsule by mouth 2 (two) times daily. 1/23/19   Sara Ching MD   gabapentin (NEURONTIN) 100 MG capsule TAKE 2 CAPSULES(200 MG) BY MOUTH EVERY EVENING 7/28/20   Rubén Davis MD   insulin degludec-liraglutide (XULTOPHY 100/3.6) 100 unit-3.6 mg /mL (3 mL) InPn Inject 34 Units into the skin once daily. 1/28/20   Sheryl Madison NP   MULTIVIT,THER IRON,CA,FA & MIN (MULTIVITAMIN) Tab Take 1 tablet by mouth every morning.     Historical Provider, MD   pen needle, diabetic 31 gauge x 1/4" Ndle Use as directed  Patient not taking: Reported on 9/2/2020 8/11/16   Mike Bass MD   torsemide (DEMADEX) 20 MG Tab Take 4 tablets (80 mg total) by mouth 2 (two) times daily. 6/16/20 7/16/20  Rubén Davis MD "     Anticoagulants/Antiplatelets: aspirin    Allergies:   Review of patient's allergies indicates:   Allergen Reactions    Statins-hmg-coa reductase inhibitors      Generalized Pain    Onglyza [saxagliptin]     Penicillins Rash     Sedation History:  no adverse reactions    Review of Systems:   Hematological: no known coagulopathies  Respiratory: no shortness of breath  Cardiovascular: no chest pain  Gastrointestinal: no abdominal pain  Genito-Urinary: no dysuria  Musculoskeletal: negative  Neurological: no TIA or stroke symptoms         OBJECTIVE:     Vital Signs (Most Recent)  BP: (!) 151/72 (09/04/20 1145)    Physical Exam:  ASA: 2  Mallampati: N/A    General: no acute distress  Mental Status: alert and oriented to person, place and time  HEENT: normocephalic, atraumatic  Chest: unlabored breathing  Heart: regular heart rate  Abdomen: distended  Extremity: moves all extremities    Laboratory  Lab Results   Component Value Date    INR 1.1 05/31/2019       Lab Results   Component Value Date    WBC 10.60 08/25/2020    HGB 11.8 (L) 08/25/2020    HCT 37.4 (L) 08/25/2020    MCV 93 08/25/2020     08/25/2020      Lab Results   Component Value Date     (H) 08/25/2020     08/25/2020    K 4.2 08/25/2020     08/25/2020    CO2 26 08/25/2020    BUN 44 (H) 08/25/2020    CREATININE 1.3 08/25/2020    CALCIUM 8.3 (L) 08/25/2020    MG 2.1 05/31/2019    ALT 30 08/25/2020    AST 21 08/25/2020    ALBUMIN 2.4 (L) 08/25/2020    BILITOT 0.7 08/25/2020       ASSESSMENT/PLAN:     Sedation Plan: local anesthesia  Patient will undergo US guided paracntesis    Vin Kong MD  Staff Radiologist  Department of Radiology  Pager: [phone]

## 2020-09-04 NOTE — PROCEDURES
Radiology Post-Procedure Note    Pre Op Diagnosis: Ascites, CHF, CKD  Post Op Diagnosis: Same    Procedure: Paracentesis    Procedure performed by: Vin Kong MD    Written Informed Consent Obtained: Yes  Specimen Removed: YES thin yellow fluid  Estimated Blood Loss: Minimal    Findings:   Successful paracentesis.  Albumin administered PRN per protocol.    Patient tolerated procedure well.    Vin Kong MD  Staff Radiologist  Department of Radiology  Pager: [phone]

## 2020-09-08 ENCOUNTER — HOSPITAL ENCOUNTER (OUTPATIENT)
Dept: WOUND CARE | Facility: HOSPITAL | Age: 60
Discharge: HOME | End: 2020-09-08
Attending: FAMILY MEDICINE
Payer: MEDICAID

## 2020-09-08 ENCOUNTER — OFFICE VISIT (OUTPATIENT)
Dept: INFECTIOUS DISEASES | Facility: CLINIC | Age: 60
End: 2020-09-08
Payer: MEDICAID

## 2020-09-08 VITALS
HEART RATE: 85 BPM | BODY MASS INDEX: 29.32 KG/M2 | TEMPERATURE: 97 F | SYSTOLIC BLOOD PRESSURE: 124 MMHG | HEIGHT: 70 IN | DIASTOLIC BLOOD PRESSURE: 69 MMHG | WEIGHT: 204.81 LBS

## 2020-09-08 VITALS — TEMPERATURE: 99 F | DIASTOLIC BLOOD PRESSURE: 73 MMHG | HEART RATE: 82 BPM | SYSTOLIC BLOOD PRESSURE: 142 MMHG

## 2020-09-08 DIAGNOSIS — E11.621 TYPE 2 DIABETES MELLITUS WITH LEFT DIABETIC FOOT ULCER: ICD-10-CM

## 2020-09-08 DIAGNOSIS — I70.244: ICD-10-CM

## 2020-09-08 DIAGNOSIS — L97.529 TYPE 2 DIABETES MELLITUS WITH LEFT DIABETIC FOOT ULCER: ICD-10-CM

## 2020-09-08 DIAGNOSIS — E11.621 DIABETIC ULCER OF TOE OF RIGHT FOOT ASSOCIATED WITH TYPE 2 DIABETES MELLITUS, WITH BONE INVOLVEMENT WITHOUT EVIDENCE OF NECROSIS: Primary | ICD-10-CM

## 2020-09-08 DIAGNOSIS — E11.622 DIABETIC ULCER OF RIGHT LOWER LEG: Primary | ICD-10-CM

## 2020-09-08 DIAGNOSIS — L97.516 DIABETIC ULCER OF TOE OF RIGHT FOOT ASSOCIATED WITH TYPE 2 DIABETES MELLITUS, WITH BONE INVOLVEMENT WITHOUT EVIDENCE OF NECROSIS: ICD-10-CM

## 2020-09-08 DIAGNOSIS — L97.516 DIABETIC ULCER OF TOE OF RIGHT FOOT ASSOCIATED WITH TYPE 2 DIABETES MELLITUS, WITH BONE INVOLVEMENT WITHOUT EVIDENCE OF NECROSIS: Primary | ICD-10-CM

## 2020-09-08 DIAGNOSIS — E11.621 DIABETIC ULCER OF TOE OF RIGHT FOOT ASSOCIATED WITH TYPE 2 DIABETES MELLITUS, WITH BONE INVOLVEMENT WITHOUT EVIDENCE OF NECROSIS: ICD-10-CM

## 2020-09-08 DIAGNOSIS — L97.919 DIABETIC ULCER OF RIGHT LOWER LEG: Primary | ICD-10-CM

## 2020-09-08 PROBLEM — S91.302D OPEN WOUND OF FOOT, LEFT, SUBSEQUENT ENCOUNTER: Status: RESOLVED | Noted: 2019-06-05 | Resolved: 2020-09-08

## 2020-09-08 PROBLEM — L03.116 LEFT LEG CELLULITIS: Status: RESOLVED | Noted: 2018-10-10 | Resolved: 2020-09-08

## 2020-09-08 PROBLEM — R79.89 AZOTEMIA: Status: RESOLVED | Noted: 2019-05-01 | Resolved: 2020-09-08

## 2020-09-08 PROBLEM — E87.1 HYPONATREMIA: Status: RESOLVED | Noted: 2019-05-01 | Resolved: 2020-09-08

## 2020-09-08 PROBLEM — I50.43 ACUTE ON CHRONIC COMBINED SYSTOLIC AND DIASTOLIC CONGESTIVE HEART FAILURE: Status: RESOLVED | Noted: 2019-03-11 | Resolved: 2020-09-08

## 2020-09-08 PROBLEM — L30.4 INTERTRIGINOUS DERMATITIS ASSOCIATED WITH MOISTURE: Status: RESOLVED | Noted: 2018-12-20 | Resolved: 2020-09-08

## 2020-09-08 PROBLEM — L03.116 CELLULITIS OF LEFT LOWER EXTREMITY: Status: RESOLVED | Noted: 2018-11-19 | Resolved: 2020-09-08

## 2020-09-08 PROBLEM — S81.809A MULTIPLE OPEN WOUNDS OF LOWER LEG: Status: RESOLVED | Noted: 2018-10-11 | Resolved: 2020-09-08

## 2020-09-08 PROBLEM — S91.302A OPEN WOUND OF LEFT FOOT: Status: RESOLVED | Noted: 2018-11-08 | Resolved: 2020-09-08

## 2020-09-08 PROBLEM — Z78.9 TAKES DIETARY SUPPLEMENTS: Status: RESOLVED | Noted: 2018-12-20 | Resolved: 2020-09-08

## 2020-09-08 PROBLEM — E11.628 DIABETIC FOOT INFECTION: Status: RESOLVED | Noted: 2018-12-10 | Resolved: 2020-09-08

## 2020-09-08 PROBLEM — E87.6 HYPOKALEMIA: Status: RESOLVED | Noted: 2019-06-04 | Resolved: 2020-09-08

## 2020-09-08 PROBLEM — L08.9 DIABETIC FOOT INFECTION: Status: RESOLVED | Noted: 2018-12-10 | Resolved: 2020-09-08

## 2020-09-08 PROBLEM — R23.9 ALTERATION IN SKIN INTEGRITY: Status: RESOLVED | Noted: 2018-12-20 | Resolved: 2020-09-08

## 2020-09-08 PROBLEM — Z71.89 GOALS OF CARE, COUNSELING/DISCUSSION: Status: RESOLVED | Noted: 2018-12-14 | Resolved: 2020-09-08

## 2020-09-08 PROBLEM — R18.8 ASCITES: Status: RESOLVED | Noted: 2019-02-11 | Resolved: 2020-09-08

## 2020-09-08 PROBLEM — E43 SEVERE MALNUTRITION: Status: RESOLVED | Noted: 2018-12-12 | Resolved: 2020-09-08

## 2020-09-08 PROCEDURE — 99214 OFFICE O/P EST MOD 30 MIN: CPT | Mod: 25,,, | Performed by: FAMILY MEDICINE

## 2020-09-08 PROCEDURE — 11042 DBRDMT SUBQ TIS 1ST 20SQCM/<: CPT | Mod: ,,, | Performed by: FAMILY MEDICINE

## 2020-09-08 PROCEDURE — 99999 PR PBB SHADOW E&M-EST. PATIENT-LVL IV: CPT | Mod: PBBFAC,,, | Performed by: INTERNAL MEDICINE

## 2020-09-08 PROCEDURE — 99214 PR OFFICE/OUTPT VISIT, EST, LEVL IV, 30-39 MIN: ICD-10-PCS | Mod: S$PBB,,, | Performed by: INTERNAL MEDICINE

## 2020-09-08 PROCEDURE — 11045 DBRDMT SUBQ TISS EACH ADDL: CPT | Performed by: FAMILY MEDICINE

## 2020-09-08 PROCEDURE — 11042 DBRDMT SUBQ TIS 1ST 20SQCM/<: CPT

## 2020-09-08 PROCEDURE — 27201912 HC WOUND CARE DEBRIDEMENT SUPPLIES: Performed by: FAMILY MEDICINE

## 2020-09-08 PROCEDURE — 99214 OFFICE O/P EST MOD 30 MIN: CPT | Mod: PBBFAC,25 | Performed by: INTERNAL MEDICINE

## 2020-09-08 PROCEDURE — 11045 DBRDMT SUBQ TISS EACH ADDL: CPT | Mod: ,,, | Performed by: FAMILY MEDICINE

## 2020-09-08 PROCEDURE — 27201912 HC WOUND CARE DEBRIDEMENT SUPPLIES

## 2020-09-08 PROCEDURE — 99214 PR OFFICE/OUTPT VISIT, EST, LEVL IV, 30-39 MIN: ICD-10-PCS | Mod: 25,,, | Performed by: FAMILY MEDICINE

## 2020-09-08 PROCEDURE — 11042 DEBRIDEMENT: ICD-10-PCS | Mod: ,,, | Performed by: FAMILY MEDICINE

## 2020-09-08 PROCEDURE — 99214 OFFICE O/P EST MOD 30 MIN: CPT | Mod: S$PBB,,, | Performed by: INTERNAL MEDICINE

## 2020-09-08 PROCEDURE — 99999 PR PBB SHADOW E&M-EST. PATIENT-LVL IV: ICD-10-PCS | Mod: PBBFAC,,, | Performed by: INTERNAL MEDICINE

## 2020-09-08 PROCEDURE — 11042 DBRDMT SUBQ TIS 1ST 20SQCM/<: CPT | Performed by: FAMILY MEDICINE

## 2020-09-08 PROCEDURE — 11045 DBRDMT SUBQ TISS EACH ADDL: CPT

## 2020-09-08 PROCEDURE — 11045 DEBRIDEMENT: ICD-10-PCS | Mod: ,,, | Performed by: FAMILY MEDICINE

## 2020-09-08 RX ORDER — CEPHALEXIN 500 MG/1
500 CAPSULE ORAL 4 TIMES DAILY
Qty: 56 CAPSULE | Refills: 0 | Status: SHIPPED | OUTPATIENT
Start: 2020-09-08 | End: 2020-09-22 | Stop reason: SDUPTHER

## 2020-09-08 RX ORDER — CIPROFLOXACIN 750 MG/1
750 TABLET, FILM COATED ORAL EVERY 12 HOURS
Qty: 28 TABLET | Refills: 0 | Status: SHIPPED | OUTPATIENT
Start: 2020-09-08 | End: 2020-09-22 | Stop reason: SDUPTHER

## 2020-09-08 NOTE — PROGRESS NOTES
Subjective:      Patient ID: Marvin Ray is a 60 y.o. male.    Chief Complaint:Follow-up      History of Present Illness    {ID HPI BLOCKS:79206}    Review of Systems   Constitution: Negative for chills, decreased appetite, fever, malaise/fatigue, night sweats, weight gain and weight loss.   HENT: Negative for congestion, ear pain, hearing loss, hoarse voice, sore throat and tinnitus.    Eyes: Positive for blurred vision. Negative for redness and visual disturbance.   Cardiovascular: Positive for leg swelling. Negative for chest pain and palpitations.   Respiratory: Negative for cough, hemoptysis, shortness of breath and sputum production.    Hematologic/Lymphatic: Negative for adenopathy. Does not bruise/bleed easily.   Skin: Negative for dry skin, itching, rash and suspicious lesions.   Musculoskeletal: Negative for back pain, joint pain, myalgias and neck pain.   Gastrointestinal: Negative for abdominal pain, constipation, diarrhea, heartburn, nausea and vomiting.   Genitourinary: Negative for dysuria, flank pain, frequency, hematuria, hesitancy and urgency.   Neurological: Negative for dizziness, headaches, numbness, paresthesias and weakness.   Psychiatric/Behavioral: Negative for depression and memory loss. The patient has insomnia. The patient is not nervous/anxious.      Objective:   Physical Exam  Assessment:       No diagnosis found.      Plan:       ***

## 2020-09-08 NOTE — PROGRESS NOTES
[facility]                Progress Note    Subjective:       Patient ID: Marvin Ray is a 60 y.o. male.    Chief Complaint: No chief complaint on file.    Patient transferred per self from wheelchair to exam chair. Pain reported as 5/10 with shooting pains off and on. Right Medial Ankle Diabetic Ulcer with medihoney this week measuring slightly smaller and appears to have improved tissue within wound bed. Right Medial Foot/Heel Diabetic Ulcer with mesalt over the week was causing increased pain and drainage and so was discontinued per sister and santyl was started. Much less avascular tissue this week with granular tissue visible at the base of the wound. Tunnel noted to Right Medial foot, however, now that yellow slough has been removed from area. Left Distal Foot and Midfoot measuring slightly larger but otherwise appears stable and unchanged from previous picture; however do note biofilm formation to wound surfaces. Patient to see Dr. West today with Infectious Disease.       Review of Systems   Constitutional: Negative.    HENT: Negative.    Eyes: Negative.    Respiratory: Negative.    Gastrointestinal: Positive for abdominal distention. Negative for abdominal pain, constipation and diarrhea.   Musculoskeletal: Negative.    Skin: Positive for wound.   Neurological: Positive for numbness.         Objective:        Physical Exam  Constitutional:       Appearance: Normal appearance.   HENT:      Head: Normocephalic and atraumatic.      Right Ear: External ear normal.      Left Ear: External ear normal.      Nose: Nose normal.      Mouth/Throat:      Mouth: Mucous membranes are moist.      Pharynx: Oropharynx is clear.   Eyes:      Extraocular Movements: Extraocular movements intact.      Pupils: Pupils are equal, round, and reactive to light.   Neck:      Musculoskeletal: Normal range of motion and neck supple.   Cardiovascular:      Rate and Rhythm: Normal  "rate and regular rhythm.   Pulmonary:      Effort: Pulmonary effort is normal. No respiratory distress.      Breath sounds: No wheezing.   Abdominal:      General: There is distension.      Palpations: Abdomen is soft.      Tenderness: There is no abdominal tenderness.   Musculoskeletal:         General: Swelling present. No tenderness.   Skin:     General: Skin is warm and dry.      Comments: +bilateral DFU with excess slough; moderate drainage; no erythema; no odor   Neurological:      Mental Status: He is alert and oriented to person, place, and time.      Sensory: Sensory deficit present.           Vitals:    09/08/20 1013   BP: (!) 142/73   Pulse: 82   Temp: 98.7 °F (37.1 °C)     Debridement    Date/Time: 9/8/2020 8:14 PM  Performed by: Rubén Davis MD  Authorized by: Rubén Davis MD     Time out: Immediately prior to procedure a "time out" was called to verify the correct patient, procedure, equipment, support staff and site/side marked as required.    Consent Done?:  Yes (Written)  Local anesthesia used?: No      Wound Details:    Location:  Left foot    Location:  Left Midfoot    Type of Debridement:  Excisional       Length (cm):  5.5       Area (sq cm):  44       Width (cm):  8       Percent Debrided (%):  100       Depth (cm):  0.2       Total Area Debrided (sq cm):  44    Depth of debridement:  Subcutaneous tissue    Tissue debrided:  Dermis, Epidermis and Subcutaneous    Devitalized tissue debrided:  Biofilm, Fibrin and Slough    Instruments:  Curette    Additional wounds:  2    2nd Wound Details:     Location:  Left foot    Type of Debridement:  Excisional       Length (cm):  3       Area (sq cm):  19.8       Width (cm):  6.6       Percent Debrided (%):  100       Depth (cm):  0.1       Total Area Debrided (sq cm):  19.8    Depth of debridement:  Subcutaneous tissue    Tissue debrided:  Dermis, Epidermis and Subcutaneous    Devitalized tissue debrided:  Biofilm, Fibrin and Slough    " Instruments:  Curette    3rd Wound Details:     Location:  Right foot    Location:  Right Midfoot    Location:  Right Midfoot    Type of Debridement:  Excisional       Length (cm):  3       Area (sq cm):  21       Width (cm):  7       Percent Debrided (%):  50       Depth (cm):  1.5       Total Area Debrided (sq cm):  10.5    Depth of debridement:  Subcutaneous tissue    Tissue debrided:  Dermis, Epidermis and Subcutaneous    Devitalized tissue debrided:  Biofilm, Fibrin and Slough    Instruments:  Curette    Bleeding:  Minimal  Hemostasis Achieved: Yes    Method Used:  Pressure  Patient tolerance:  Patient tolerated the procedure well with no immediate complications      Assessment:           ICD-10-CM ICD-9-CM   1. Diabetic ulcer of right lower leg  E11.622 250.80    L97.919 707.10   2. Diabetic ulcer of toe of right foot associated with type 2 diabetes mellitus, with bone involvement without evidence of necrosis  E11.621 250.80    L97.516 707.15   3. Type 2 diabetes mellitus with left diabetic foot ulcer  E11.621 250.80    L97.529 707.15   4. Atherosclerosis of left lower extremity with ulceration of midfoot  I70.244 440.23     707.14            Wound 11/02/18 Diabetic Ulcer Left medial Foot (Active)   11/02/18     Pre-existing: Yes   Primary Wound Type: Diabetic ulc   Side: Left   Orientation: medial   Location: Foot   Wound Number (optional):    Ankle-Brachial Index:    Pulses:    Removal Indication and Assessment:    Wound Outcome:    (Retired) Wound Type:    (Retired) Wound Length (cm):    (Retired) Wound Width (cm):    (Retired) Depth (cm):    Wound Description (Comments):    Removal Indications:    Wound Image   09/08/20 1000   Wound WDL ex 09/08/20 1000   Dressing Appearance Moist drainage 09/08/20 1000   Drainage Amount Moderate 09/08/20 1000   Drainage Characteristics/Odor Serosanguineous 09/08/20 1000   Appearance Pink;Red;Yellow 09/08/20 1000   Tissue loss description Full thickness 09/08/20 1000    Black (%), Wound Tissue Color 0 % 09/08/20 1000   Red (%), Wound Tissue Color 60 % 09/08/20 1000   Yellow (%), Wound Tissue Color 40 % 09/08/20 1000   Periwound Area Macerated 09/08/20 1000   Wound Edges Open 09/08/20 1000   Wound Length (cm) 5.5 cm 09/08/20 1000   Wound Width (cm) 8 cm 09/08/20 1000   Wound Depth (cm) 0.15 cm 09/08/20 1000   Wound Volume (cm^3) 6.6 cm^3 09/08/20 1000   Wound Surface Area (cm^2) 44 cm^2 09/08/20 1000   Care Cleansed with:;Sterile normal saline;Soap and water 09/08/20 1000   Dressing Changed 09/08/20 1000   Periwound Care Moisture barrier applied 09/08/20 1000   Off Loading Football dressing 09/08/20 1000            Wound 05/08/20 1015 Diabetic Ulcer Left distal Foot (Active)   05/08/20 1015    Pre-existing: Yes   Primary Wound Type: Diabetic ulc   Side: Left   Orientation: distal   Location: Foot   Wound Number (optional):    Ankle-Brachial Index:    Pulses:    Removal Indication and Assessment:    Wound Outcome:    (Retired) Wound Type:    (Retired) Wound Length (cm):    (Retired) Wound Width (cm):    (Retired) Depth (cm):    Wound Description (Comments):    Removal Indications:    Wound Image   09/08/20 1000   Wound WDL ex 09/08/20 1000   Dressing Appearance Moist drainage 09/08/20 1000   Drainage Amount Moderate 09/08/20 1000   Drainage Characteristics/Odor Serosanguineous 09/08/20 1000   Appearance Pink;Red;Yellow 09/08/20 1000   Tissue loss description Full thickness 09/08/20 1000   Black (%), Wound Tissue Color 0 % 09/08/20 1000   Red (%), Wound Tissue Color 90 % 09/08/20 1000   Yellow (%), Wound Tissue Color 10 % 09/08/20 1000   Periwound Area Scar tissue 09/08/20 1000   Wound Edges Open 09/08/20 1000   Wound Length (cm) 3 cm 09/08/20 1000   Wound Width (cm) 6.6 cm 09/08/20 1000   Wound Depth (cm) 0.1 cm 09/08/20 1000   Wound Volume (cm^3) 1.98 cm^3 09/08/20 1000   Wound Surface Area (cm^2) 19.8 cm^2 09/08/20 1000   Care Cleansed with:;Soap and water;Sterile normal saline  09/08/20 1000   Dressing Changed 09/08/20 1000   Periwound Care Moisture barrier applied 09/08/20 1000   Off Loading Football dressing 09/08/20 1000            Wound 08/11/20 1051 Diabetic Ulcer Right medial Malleolus/Ankle (Active)   08/11/20 1051    Pre-existing: No   Primary Wound Type: Diabetic ulc   Side: Right   Orientation: medial   Location: Malleolus/Ankle   Wound Number (optional):    Ankle-Brachial Index:    Pulses:    Removal Indication and Assessment:    Wound Outcome:    (Retired) Wound Type:    (Retired) Wound Length (cm):    (Retired) Wound Width (cm):    (Retired) Depth (cm):    Wound Description (Comments):    Removal Indications:    Wound Image   09/08/20 1000   Wound WDL ex 09/08/20 1000   Dressing Appearance Moist drainage 09/08/20 1000   Drainage Amount Moderate 09/08/20 1000   Drainage Characteristics/Odor Serosanguineous 09/08/20 1000   Appearance Pink;Yellow;Slough 09/08/20 1000   Tissue loss description Full thickness 09/08/20 1000   Black (%), Wound Tissue Color 0 % 09/08/20 1000   Red (%), Wound Tissue Color 80 % 09/08/20 1000   Yellow (%), Wound Tissue Color 20 % 09/08/20 1000   Periwound Area Macerated 09/08/20 1000   Wound Edges Rolled/closed 09/08/20 1000   Wound Length (cm) 2.1 cm 09/08/20 1000   Wound Width (cm) 2.6 cm 09/08/20 1000   Wound Depth (cm) 0.2 cm 09/08/20 1000   Wound Volume (cm^3) 1.09 cm^3 09/08/20 1000   Wound Surface Area (cm^2) 5.46 cm^2 09/08/20 1000   Care Cleansed with:;Soap and water;Sterile normal saline 09/08/20 1000   Dressing Changed 09/08/20 1000   Off Loading Football dressing 09/08/20 1000            Wound 08/11/20 1052 Diabetic Ulcer Right medial Foot (Active)   08/11/20 1052    Pre-existing: No   Primary Wound Type: Diabetic ulc   Side: Right   Orientation: medial   Location: Foot   Wound Number (optional):    Ankle-Brachial Index:    Pulses:    Removal Indication and Assessment:    Wound Outcome:    (Retired) Wound Type:    (Retired) Wound Length (cm):     (Retired) Wound Width (cm):    (Retired) Depth (cm):    Wound Description (Comments):    Removal Indications:    Wound Image   09/08/20 1000   Wound WDL ex 09/08/20 1000   Dressing Appearance Moist drainage 09/08/20 1000   Drainage Amount Moderate 09/08/20 1000   Drainage Characteristics/Odor Serosanguineous 09/08/20 1000   Appearance Yellow;Adipose;Pink;Red 09/08/20 1000   Tissue loss description Full thickness 09/08/20 1000   Black (%), Wound Tissue Color 0 % 09/08/20 1000   Red (%), Wound Tissue Color 50 % 09/08/20 1000   Yellow (%), Wound Tissue Color 20 % 09/08/20 1000   Periwound Area Macerated 09/08/20 1000   Wound Edges Open 09/08/20 1000   Wound Length (cm) 3 cm 09/08/20 1000   Wound Width (cm) 7 cm 09/08/20 1000   Wound Depth (cm) 1.3 cm 09/08/20 1000   Wound Volume (cm^3) 27.3 cm^3 09/08/20 1000   Wound Surface Area (cm^2) 21 cm^2 09/08/20 1000   Tunneling (depth (cm)/location) 1.2 (cm) at 7 o'clock 09/08/20 1000   Care Cleansed with:;Soap and water;Sterile normal saline 09/08/20 1000   Dressing Changed 09/08/20 1000   Periwound Care Moisture barrier applied 09/08/20 1000   Off Loading Football dressing 09/08/20 1000            Incision/Site 06/16/20 0930 Right Toe, third anterior (Active)   06/16/20 0930   Present Prior to Hospital Arrival?: Yes   Side: Right   Location: Toe, third   Orientation: anterior   Incision Type:    Closure Method:    Additional Comments:    Removal Indication and Assessment:    Wound Outcome:    Removal Indications:    Wound Image   09/08/20 1000   Incision WDL ex 09/08/20 1000   Dressing Appearance Dried drainage 09/08/20 1000   Drainage Amount Small 09/08/20 1000   Drainage Characteristics/Odor Serosanguineous 09/08/20 1000   Appearance Bone;Red 09/08/20 1000   Black (%), Wound Tissue Color 0 % 09/08/20 1000   Red (%), Wound Tissue Color 10 % 09/08/20 1000   Yellow (%), Wound Tissue Color 0 % 09/08/20 1000   Periwound Area Intact 09/08/20 1000   Wound Edges Open 09/08/20  1000   Wound Length (cm) 0.2 cm 09/08/20 1000   Wound Width (cm) 0.3 cm 09/08/20 1000   Wound Depth (cm) 0.2 cm 09/08/20 1000   Wound Volume (cm^3) 0.01 cm^3 09/08/20 1000   Wound Surface Area (cm^2) 0.06 cm^2 09/08/20 1000   Care Cleansed with:;Soap and water;Sterile normal saline 09/08/20 1000   Dressing Changed 09/08/20 1000   Periwound Care Moisture barrier applied 09/08/20 1000   Off Loading Football dressing 09/08/20 1000           Plan:                Orders Placed This Encounter   Procedures    Debridement     This order was created via procedure documentation     Standing Status:   Standing     Number of Occurrences:   1    Change dressing     Clean with vashe/debrisoft. Apply santyl, mextra, cast padding x 3, stockinet. Patient to change daily.    Change dressing     Clean with vashe/debrisoft. Apply endoform, mextra, cast padding x 2, stockinet.        Follow up in about 1 week (around 9/15/2020) for wound care.   Rubén Davis MD

## 2020-09-08 NOTE — PROGRESS NOTES
Subjective:      Patient ID: Marvin Ray is a 60 y.o. male.    Chief Complaint:Follow-up      History of Present Illness    59 y/o with a history of DM type 2, CAD with CHF s/p AICD placement.  He also has a history of diabetic foot ulcer and osteomyelitis.  He was treated by myself earlier this year.  He is referred to me for evaluation of a foot ulcer.  Cultures were positive for pseudomonas and group b strep.  At his last visit, he was placed on cefadroxil and ciprofloxacin. The foot ulcer is improved.    Review of Systems   Constitution: Negative for chills, decreased appetite, fever, malaise/fatigue, night sweats, weight gain and weight loss.   HENT: Negative for congestion, ear pain, hearing loss, hoarse voice, sore throat and tinnitus.    Eyes: Negative for blurred vision, redness and visual disturbance.   Cardiovascular: Positive for leg swelling. Negative for chest pain and palpitations.   Respiratory: Negative for cough, hemoptysis, shortness of breath and sputum production.    Hematologic/Lymphatic: Negative for adenopathy. Does not bruise/bleed easily.   Skin: Positive for itching. Negative for dry skin, rash and suspicious lesions.   Musculoskeletal: Negative for back pain, joint pain, myalgias and neck pain.   Gastrointestinal: Negative for abdominal pain, constipation, diarrhea, heartburn, nausea and vomiting.   Genitourinary: Negative for dysuria, flank pain, frequency, hematuria, hesitancy and urgency.   Neurological: Negative for dizziness, headaches, numbness, paresthesias and weakness.   Psychiatric/Behavioral: Negative for depression and memory loss. The patient does not have insomnia and is not nervous/anxious.      Objective:   Physical Exam  Vitals signs and nursing note reviewed.   Constitutional:       General: He is not in acute distress.     Appearance: He is well-developed. He is not diaphoretic.   HENT:      Head: Normocephalic and atraumatic.      Right Ear: External ear normal.       Left Ear: External ear normal.      Nose: Nose normal.      Mouth/Throat:      Pharynx: No oropharyngeal exudate.   Eyes:      General: No scleral icterus.        Right eye: No discharge.         Left eye: No discharge.      Conjunctiva/sclera: Conjunctivae normal.      Pupils: Pupils are equal, round, and reactive to light.   Neck:      Musculoskeletal: Normal range of motion and neck supple.      Thyroid: No thyromegaly.      Vascular: No JVD.      Trachea: No tracheal deviation.   Cardiovascular:      Rate and Rhythm: Normal rate and regular rhythm.      Heart sounds: No murmur. No friction rub. No gallop.    Pulmonary:      Effort: Pulmonary effort is normal. No respiratory distress.      Breath sounds: Normal breath sounds. No stridor. No wheezing or rales.   Chest:      Chest wall: No tenderness.   Abdominal:      General: Bowel sounds are normal. There is no distension.      Palpations: Abdomen is soft. There is no mass.      Tenderness: There is no abdominal tenderness. There is no guarding or rebound.   Musculoskeletal: Normal range of motion.         General: No tenderness.   Lymphadenopathy:      Cervical: No cervical adenopathy.   Skin:     General: Skin is warm.      Coloration: Skin is not pale.      Findings: No erythema or rash.   Neurological:      Mental Status: He is alert and oriented to person, place, and time.      Cranial Nerves: No cranial nerve deficit.      Motor: No abnormal muscle tone.      Coordination: Coordination normal.      Deep Tendon Reflexes: Reflexes are normal and symmetric. Reflexes normal.   Psychiatric:         Behavior: Behavior normal.         Thought Content: Thought content normal.         Judgment: Judgment normal.           Assessment:       1. Diabetic ulcer of toe of right foot associated with type 2 diabetes mellitus, with bone involvement without evidence of necrosis        Cultures were positive for pseudomonas and group b strep.  He is s/p 2 weeks of oral  keflex and ciprofloxacin.  I will extend his antibiotics for another 2 weeks.  Cefadroxil was expensive so will switch that to keflex.  Plan:       Diabetic ulcer of toe of right foot associated with type 2 diabetes mellitus, with bone involvement without evidence of necrosis  -     cephALEXin (KEFLEX) 500 MG capsule; Take 1 capsule (500 mg total) by mouth 4 (four) times daily. for 14 days  Dispense: 56 capsule; Refill: 0  -     ciprofloxacin HCl (CIPRO) 750 MG tablet; Take 1 tablet (750 mg total) by mouth every 12 (twelve) hours. for 14 days  Dispense: 28 tablet; Refill: 0

## 2020-09-09 ENCOUNTER — NURSE TRIAGE (OUTPATIENT)
Dept: ADMINISTRATIVE | Facility: CLINIC | Age: 60
End: 2020-09-09

## 2020-09-09 NOTE — TELEPHONE ENCOUNTER
Pt contacted through the Post Procedural Symptom Tracker. No answer. No additional contact required per post procedure protocol.    Reason for Disposition   Caller has cancelled the call before the first contact    Protocols used: NO CONTACT OR DUPLICATE CONTACT CALL-A-AH

## 2020-09-10 NOTE — PROGRESS NOTES
CC: This 60 y.o. White male  is here for evaluation of  T2DM along with comorbidities indicated in the Visit Diagnosis section of this encounter.    HPI: Marvin Ray was diagnosed with T2DM in his late 20s. Pt was initially diet controlled, then required oral medications, then eventually required insulin.   A1c in July 2018 was high at > 14%  because he couldn't afford insulin (Lantus ~$600).        Prior visit 6/2020 arrives with his sister Chloé.  Continues to see Dr. Davis in Wound Care for chronic left foot wound. States that his foot is almost completely healed.   Overall BGs are higher. He admits that he has not been monitoring his diet. He is snacking overnight - a fruit or 2 no-sugar cookies. Eating more fruit than vegetables. Drinks more cranberry juice now. Doesn't eat as much salads.   Plan DM worse because of diet. Needs to focus on improving diet. Avoid beverages with sugar and watch milk intake.   Labs today. A1C and BMP.   Test glucose 1-2x/day. Before meal or 2 hours after a meal.   Continue Xultophy 35 units once daily.  Return to clinic in 3 mo with a1c and lipid panel.      Interval history arrives with his sister Chloé.  a1c is worse from 8.3 to 8.7%. He now has right wound infection as well. Denies overeating.   His sister has been struggling with her own medical issues and she's missed giving him his shots a few times and hasn't been testing his BGs in the last month. Pt takes very long to administer his own injections and test his BG.       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 Also suffered cardiac arrest in Dec 2018.   Ischemic cardiomyopathy, CAD, PVD     LAST DIABETES EDUCATION: years ago     PRESCRIBED DIABETES MEDICATIONS:  Xultophy 35 units every morning, Novolog per sliding scale - Use on premeal readings: 150-200=+2, 201-250=+4; 251-300=+6; 301-350=+8, over 350=+10 units      Misses medication doses - No    DM COMPLICATIONS: nephropathy, retinopathy,  peripheral neuropathy and cardiovascular disease  toe amputations (2/2 infection)    SIGNIFICANT DIABETES MED HISTORY:   Constipation on Onglyza   Reports oral meds like metformin and glyburide were ineffective.     SELF MONITORING BLOOD GLUCOSE: none in the last month    HYPOGLYCEMIC EPISODES: recalls just one episode when he felt his BG drop, presumably related to not eating carbs for dinner. Corrected with juice.      CURRENT DIET: drinks water, protein drink, sometimes coke zero, a pint of milk every other day. Eats 3 meals/day. Eats 1-2 yogurts a day plain. Also likes diluted cranberry juice.             /71 (BP Location: Right arm, Patient Position: Sitting, BP Method: Large (Automatic))   Pulse 75   Temp 98.8 °F (37.1 °C) (Oral)       ROS:   CONSTITUTIONAL: Appetite good, + fatigue  SKIN: wounds to both feet. Left foot reportedly almost all healed       PHYSICAL EXAM:  GENERAL: Well developed, well nourished. No acute distress.   PSYCH: AAOx3, appropriate mood and affect, conversant, casually-groomed. Judgement and insight good. Appears chronically ill. Arrives in w/c   NEURO: Cranial nerves grossly intact. Speech clear, no tremor.   CHEST: Respirations even and unlabored.         Hemoglobin A1C   Date Value Ref Range Status   09/04/2020 8.7 (H) 4.0 - 5.6 % Final     Comment:     ADA Screening Guidelines:  5.7-6.4%  Consistent with prediabetes  >or=6.5%  Consistent with diabetes  High levels of fetal hemoglobin interfere with the HbA1C  assay. Heterozygous hemoglobin variants (HbS, HgC, etc)do  not significantly interfere with this assay.   However, presence of multiple variants may affect accuracy.     06/11/2020 8.3 (H) 4.0 - 5.6 % Final     Comment:     ADA Screening Guidelines:  5.7-6.4%  Consistent with prediabetes  >or=6.5%  Consistent with diabetes  High levels of fetal hemoglobin interfere with the HbA1C  assay. Heterozygous hemoglobin variants (HbS, HgC, etc)do  not significantly interfere  with this assay.   However, presence of multiple variants may affect accuracy.     12/10/2019 7.5 (H) 4.0 - 5.6 % Final     Comment:     ADA Screening Guidelines:  5.7-6.4%  Consistent with prediabetes  >or=6.5%  Consistent with diabetes  High levels of fetal hemoglobin interfere with the HbA1C  assay. Heterozygous hemoglobin variants (HbS, HgC, etc)do  not significantly interfere with this assay.   However, presence of multiple variants may affect accuracy.             Chemistry        Component Value Date/Time     08/25/2020 1016    K 4.2 08/25/2020 1016     08/25/2020 1016    CO2 26 08/25/2020 1016    BUN 44 (H) 08/25/2020 1016    CREATININE 1.3 08/25/2020 1016     (H) 08/25/2020 1016        Component Value Date/Time    CALCIUM 8.3 (L) 08/25/2020 1016    ALKPHOS 160 (H) 08/25/2020 1016    AST 21 08/25/2020 1016    ALT 30 08/25/2020 1016    BILITOT 0.7 08/25/2020 1016    ESTGFRAFRICA >60 08/25/2020 1016    EGFRNONAA 58 (A) 08/25/2020 1016          Lab Results   Component Value Date    LDLCALC 84.2 09/04/2020          Ref. Range 9/4/2020 08:14   Cholesterol Latest Ref Range: 120 - 199 mg/dL 125   HDL Latest Ref Range: 40 - 75 mg/dL 30 (L)   Hdl/Cholesterol Ratio Latest Ref Range: 20.0 - 50.0 % 24.0   LDL Cholesterol External Latest Ref Range: 63.0 - 159.0 mg/dL 84.2   Non-HDL Cholesterol Latest Units: mg/dL 95   Total Cholesterol/HDL Ratio Latest Ref Range: 2.0 - 5.0  4.2   Triglycerides Latest Ref Range: 30 - 150 mg/dL 54     Lab Results   Component Value Date    MICALBCREAT 4564.0 (H) 07/27/2018           STANDARDS of CARE:        ASA:               Last eye exam:       ASSESSMENT and PLAN:    A1C GOAL: < 7 %     1. DM type 2 with diabetic peripheral neuropathy  Suspect higher a1c is related to new right foot infection.     Stop Xultophy and start taking long acting insulin and GLP1-receptor agonist separately so that he can get higher dosing of the latter.     Start Lantus 35 units once daily  in the morning.   Start Victoza 1.2 mg once daily for 2 weeks. Then increase to 1.8 mg once daily.   Test glucose 2x/day.   Follow up in 2 months with a1c prior.       Hemoglobin A1C   2. Coronary artery disease, angina presence unspecified, unspecified vessel or lesion type, unspecified whether native or transplanted heart  Improve glycemic control.      3. Essential hypertension  controlled   4. Hyperlipidemia, unspecified hyperlipidemia type  Continue Zetia        Orders Placed This Encounter   Procedures    Hemoglobin A1C     Standing Status:   Future     Standing Expiration Date:   11/10/2021        Follow up in about 2 months (around 11/11/2020).

## 2020-09-11 ENCOUNTER — OFFICE VISIT (OUTPATIENT)
Dept: ENDOCRINOLOGY | Facility: CLINIC | Age: 60
End: 2020-09-11
Payer: MEDICAID

## 2020-09-11 ENCOUNTER — CLINICAL SUPPORT (OUTPATIENT)
Dept: FAMILY MEDICINE | Facility: CLINIC | Age: 60
End: 2020-09-11
Payer: MEDICAID

## 2020-09-11 ENCOUNTER — PATIENT OUTREACH (OUTPATIENT)
Dept: ADMINISTRATIVE | Facility: OTHER | Age: 60
End: 2020-09-11

## 2020-09-11 VITALS — HEART RATE: 75 BPM | TEMPERATURE: 99 F | SYSTOLIC BLOOD PRESSURE: 117 MMHG | DIASTOLIC BLOOD PRESSURE: 71 MMHG

## 2020-09-11 DIAGNOSIS — I25.10 CORONARY ARTERY DISEASE, ANGINA PRESENCE UNSPECIFIED, UNSPECIFIED VESSEL OR LESION TYPE, UNSPECIFIED WHETHER NATIVE OR TRANSPLANTED HEART: ICD-10-CM

## 2020-09-11 DIAGNOSIS — E11.42 DM TYPE 2 WITH DIABETIC PERIPHERAL NEUROPATHY: Primary | ICD-10-CM

## 2020-09-11 DIAGNOSIS — I10 ESSENTIAL HYPERTENSION: ICD-10-CM

## 2020-09-11 DIAGNOSIS — Z23 NEED FOR TETANUS BOOSTER: Primary | ICD-10-CM

## 2020-09-11 DIAGNOSIS — E78.5 HYPERLIPIDEMIA, UNSPECIFIED HYPERLIPIDEMIA TYPE: ICD-10-CM

## 2020-09-11 DIAGNOSIS — Z23 FLU VACCINE NEED: ICD-10-CM

## 2020-09-11 PROCEDURE — 99214 PR OFFICE/OUTPT VISIT, EST, LEVL IV, 30-39 MIN: ICD-10-PCS | Mod: S$PBB,,, | Performed by: NURSE PRACTITIONER

## 2020-09-11 PROCEDURE — 99214 OFFICE O/P EST MOD 30 MIN: CPT | Mod: S$PBB,,, | Performed by: NURSE PRACTITIONER

## 2020-09-11 PROCEDURE — 99499 UNLISTED E&M SERVICE: CPT | Mod: S$PBB,,, | Performed by: NURSE PRACTITIONER

## 2020-09-11 PROCEDURE — 90472 IMMUNIZATION ADMIN EACH ADD: CPT | Mod: PBBFAC,PN

## 2020-09-11 PROCEDURE — 99214 OFFICE O/P EST MOD 30 MIN: CPT | Mod: PBBFAC,PN | Performed by: NURSE PRACTITIONER

## 2020-09-11 PROCEDURE — 99499 NO LOS: ICD-10-PCS | Mod: S$PBB,,, | Performed by: NURSE PRACTITIONER

## 2020-09-11 PROCEDURE — 90471 IMMUNIZATION ADMIN: CPT | Mod: PBBFAC,PN

## 2020-09-11 PROCEDURE — 99999 PR PBB SHADOW E&M-EST. PATIENT-LVL IV: CPT | Mod: PBBFAC,,, | Performed by: NURSE PRACTITIONER

## 2020-09-11 PROCEDURE — 99999 PR PBB SHADOW E&M-EST. PATIENT-LVL IV: ICD-10-PCS | Mod: PBBFAC,,, | Performed by: NURSE PRACTITIONER

## 2020-09-11 PROCEDURE — 90715 TDAP VACCINE 7 YRS/> IM: CPT | Mod: PBBFAC,PN

## 2020-09-11 RX ORDER — INSULIN GLARGINE 100 [IU]/ML
INJECTION, SOLUTION SUBCUTANEOUS
Qty: 15 ML | Refills: 3 | Status: SHIPPED | OUTPATIENT
Start: 2020-09-11 | End: 2020-12-09

## 2020-09-11 RX ORDER — PEN NEEDLE, DIABETIC 30 GX3/16"
1 NEEDLE, DISPOSABLE MISCELLANEOUS 2 TIMES DAILY
Qty: 100 EACH | Refills: 11 | Status: SHIPPED | OUTPATIENT
Start: 2020-09-11

## 2020-09-11 RX ORDER — LIRAGLUTIDE 6 MG/ML
1.8 INJECTION SUBCUTANEOUS DAILY
Qty: 9 ML | Refills: 3 | Status: SHIPPED | OUTPATIENT
Start: 2020-09-11 | End: 2020-10-27 | Stop reason: CLARIF

## 2020-09-11 NOTE — PROGRESS NOTES
Patient tolerated flu med. administration well.  Instructed to wait for 15 minutes to monitor for any adverse reactions.  IM Tdap injection administered as ordered.  Pt. tolerated well with no complaints.

## 2020-09-11 NOTE — PATIENT INSTRUCTIONS
Stop Xultophy and start taking long acting insulin and GLP1-receptor agonist separately so that he can get higher dosing of the latter.     Start Lantus 35 units once daily in the morning.   Start Victoza 1.2 mg once daily for 2 weeks. Then increase to 1.8 mg once daily.   Test glucose 2x/day.   Follow up in 2 months with a1c prior.

## 2020-09-15 ENCOUNTER — HOSPITAL ENCOUNTER (OUTPATIENT)
Dept: WOUND CARE | Facility: HOSPITAL | Age: 60
Discharge: HOME | End: 2020-09-15
Attending: FAMILY MEDICINE
Payer: MEDICAID

## 2020-09-15 VITALS
TEMPERATURE: 98 F | DIASTOLIC BLOOD PRESSURE: 61 MMHG | RESPIRATION RATE: 17 BRPM | HEART RATE: 81 BPM | SYSTOLIC BLOOD PRESSURE: 115 MMHG

## 2020-09-15 DIAGNOSIS — E11.621 TYPE 2 DIABETES MELLITUS WITH LEFT DIABETIC FOOT ULCER: Primary | ICD-10-CM

## 2020-09-15 DIAGNOSIS — L97.516 DIABETIC ULCER OF TOE OF RIGHT FOOT ASSOCIATED WITH TYPE 2 DIABETES MELLITUS, WITH BONE INVOLVEMENT WITHOUT EVIDENCE OF NECROSIS: ICD-10-CM

## 2020-09-15 DIAGNOSIS — E11.621 DIABETIC ULCER OF TOE OF RIGHT FOOT ASSOCIATED WITH TYPE 2 DIABETES MELLITUS, WITH BONE INVOLVEMENT WITHOUT EVIDENCE OF NECROSIS: ICD-10-CM

## 2020-09-15 DIAGNOSIS — L97.529 TYPE 2 DIABETES MELLITUS WITH LEFT DIABETIC FOOT ULCER: Primary | ICD-10-CM

## 2020-09-15 DIAGNOSIS — E11.622 DIABETIC ULCER OF RIGHT LOWER LEG: ICD-10-CM

## 2020-09-15 DIAGNOSIS — M86.9 OSTEOMYELITIS, UNSPECIFIED SITE, UNSPECIFIED TYPE: ICD-10-CM

## 2020-09-15 DIAGNOSIS — L97.919 DIABETIC ULCER OF RIGHT LOWER LEG: ICD-10-CM

## 2020-09-15 PROCEDURE — 99214 OFFICE O/P EST MOD 30 MIN: CPT | Mod: ,,, | Performed by: FAMILY MEDICINE

## 2020-09-15 PROCEDURE — 11042 DEBRIDEMENT: ICD-10-PCS | Mod: ,,, | Performed by: FAMILY MEDICINE

## 2020-09-15 PROCEDURE — 27201912 HC WOUND CARE DEBRIDEMENT SUPPLIES: Performed by: FAMILY MEDICINE

## 2020-09-15 PROCEDURE — 99214 PR OFFICE/OUTPT VISIT, EST, LEVL IV, 30-39 MIN: ICD-10-PCS | Mod: ,,, | Performed by: FAMILY MEDICINE

## 2020-09-15 PROCEDURE — 11045 DEBRIDEMENT: ICD-10-PCS | Mod: ,,, | Performed by: FAMILY MEDICINE

## 2020-09-15 PROCEDURE — 11045 DBRDMT SUBQ TISS EACH ADDL: CPT | Mod: ,,, | Performed by: FAMILY MEDICINE

## 2020-09-15 PROCEDURE — 11042 DBRDMT SUBQ TIS 1ST 20SQCM/<: CPT | Performed by: FAMILY MEDICINE

## 2020-09-15 PROCEDURE — 11042 DBRDMT SUBQ TIS 1ST 20SQCM/<: CPT | Mod: ,,, | Performed by: FAMILY MEDICINE

## 2020-09-15 PROCEDURE — 11045 DBRDMT SUBQ TISS EACH ADDL: CPT | Performed by: FAMILY MEDICINE

## 2020-09-15 NOTE — PROGRESS NOTES
[facility]                Progress Note    Subjective:       Patient ID: Marvin Ray is a 60 y.o. male.    Chief Complaint: Non-healing Wound Follow Up and Diabetic Foot Ulcer    09/15/2020: F/U visit. Wounds are stable. Debridement performed. Right toe third bone exposed. Right medial foot macerated periwound and increased drainage. New blister to right medial leg and applied aquacel foam border. Discussed elevating as much as possible. The wound to the medial foot and to the toe are not improving and concerning for underlying osteomyelitis.        Review of Systems   Constitutional: Negative.    HENT: Negative.    Eyes: Negative.    Cardiovascular: Negative.    Gastrointestinal: Positive for abdominal distention. Negative for abdominal pain, anal bleeding and blood in stool.   Genitourinary: Negative.    Skin: Positive for wound.   Neurological: Negative.    Psychiatric/Behavioral: Negative.          Objective:        Physical Exam  Constitutional:       Appearance: Normal appearance.   HENT:      Head: Normocephalic and atraumatic.      Right Ear: External ear normal.      Left Ear: External ear normal.      Mouth/Throat:      Mouth: Mucous membranes are moist.      Pharynx: Oropharynx is clear.   Eyes:      Extraocular Movements: Extraocular movements intact.      Pupils: Pupils are equal, round, and reactive to light.   Pulmonary:      Effort: Pulmonary effort is normal. No respiratory distress.      Breath sounds: No wheezing.   Abdominal:      General: There is distension.      Tenderness: There is no abdominal tenderness. There is no guarding.   Skin:     General: Skin is warm and dry.      Comments: +new wound to RLE from blister busting, but no slough or maceration  +Right DFU's not improving; continue to have excess slough present requiring debridement and this week excess drainage.  No odor  +left DFU stable with no significant size change, but excess  "slough requiring debridement   Neurological:      Mental Status: He is alert.           Vitals:    09/15/20 1020   BP: 115/61   Pulse: 81   Resp: 17   Temp: 97.7 °F (36.5 °C)     Debridement    Date/Time: 9/15/2020 10:30 AM  Performed by: Rubén Davis MD  Authorized by: Rubén Davis MD     Time out: Immediately prior to procedure a "time out" was called to verify the correct patient, procedure, equipment, support staff and site/side marked as required.    Consent Done?:  Yes (Written)  Local anesthesia used?: Yes    Local anesthetic:  Topical anesthetic  Anesthetic total (ml):  10    Wound Details:    Location:  Right foot    Location:  Right Midfoot    Type of Debridement:  Excisional       Length (cm):  4       Area (sq cm):  28       Width (cm):  7       Percent Debrided (%):  100       Depth (cm):  1.1       Total Area Debrided (sq cm):  28    Depth of debridement:  Subcutaneous tissue    Tissue debrided:  Dermis and Epidermis    Devitalized tissue debrided:  Biofilm, Callus, Fibrin and Slough    Instruments:  Curette    Additional wounds:  3    2nd Wound Details:     Location:  Right foot    Location:  Right Ankle    Location:  Right Ankle    Type of Debridement:  Excisional       Length (cm):  2.1       Area (sq cm):  5.04       Width (cm):  2.4       Percent Debrided (%):  100       Depth (cm):  0.2       Total Area Debrided (sq cm):  5.04    Depth of debridement:  Subcutaneous tissue    Tissue debrided:  Dermis, Epidermis and Subcutaneous    Devitalized tissue debrided:  Biofilm, Fibrin and Slough    Instruments:  Curette    3rd Wound Details:     Location:  Left foot    Location:  Left Midfoot    Location:  Left Midfoot    Type of Debridement:  Excisional       Length (cm):  7.5       Area (sq cm):  42.75       Width (cm):  5.7       Percent Debrided (%):  100       Depth (cm):  0.2       Total Area Debrided (sq cm):  42.75    Depth of debridement:  Subcutaneous tissue    Tissue debrided:  Dermis, " Epidermis and Subcutaneous    Devitalized tissue debrided:  Biofilm, Slough and Fibrin    Instruments:  Curette    4th Wound Details:     Location:  Left foot    Location:  Left Dorsal Foot    Location:  Left Dorsal Foot    Type of Debridement:  Excisional       Length (cm):  3       Area (sq cm):  24.9       Width (cm):  8.3       Percent Debrided (%):  100       Depth (cm):  0.2       Total Area Debrided (sq cm):  24.9    Depth of debridement:  Subcutaneous tissue    Tissue debrided:  Dermis, Epidermis and Subcutaneous    Devitalized tissue debrided:  Slough, Fibrin and Biofilm    Instruments:  Curette    Bleeding:  Minimal  Hemostasis Achieved: Yes    Method Used:  Pressure  Patient tolerance:  Patient tolerated the procedure well with no immediate complications      Assessment:           ICD-10-CM ICD-9-CM   1. Type 2 diabetes mellitus with left diabetic foot ulcer  E11.621 250.80    L97.529 707.15   2. Diabetic ulcer of right lower leg  E11.622 250.80    L97.919 707.10   3. Diabetic ulcer of toe of right foot associated with type 2 diabetes mellitus, with bone involvement without evidence of necrosis  E11.621 250.80    L97.516 707.15   4. Osteomyelitis, unspecified site, unspecified type  M86.9 730.20            Wound 11/02/18 Diabetic Ulcer Left medial Foot (Active)   11/02/18     Pre-existing: Yes   Primary Wound Type: Diabetic ulc   Side: Left   Orientation: medial   Location: Foot   Wound Number (optional):    Ankle-Brachial Index:    Pulses:    Removal Indication and Assessment:    Wound Outcome:    (Retired) Wound Type:    (Retired) Wound Length (cm):    (Retired) Wound Width (cm):    (Retired) Depth (cm):    Wound Description (Comments):    Removal Indications:    Wound Image   09/15/20 1000   Wound WDL ex 09/15/20 1000   Dressing Appearance Intact;Moist drainage 09/15/20 1000   Drainage Amount Moderate 09/15/20 1000   Drainage Characteristics/Odor Serous;Green 09/15/20 1000   Appearance  Red;Pink;White;Slough 09/15/20 1000   Black (%), Wound Tissue Color 0 % 09/15/20 1000   Red (%), Wound Tissue Color 20 % 09/15/20 1000   Yellow (%), Wound Tissue Color 80 % 09/15/20 1000   Periwound Area Macerated;Scar tissue 09/15/20 1000   Wound Edges Irregular 09/15/20 1000   Wound Length (cm) 7.5 cm 09/15/20 1000   Wound Width (cm) 5.7 cm 09/15/20 1000   Wound Depth (cm) 0.15 cm 09/15/20 1000   Wound Volume (cm^3) 6.41 cm^3 09/15/20 1000   Wound Surface Area (cm^2) 42.75 cm^2 09/15/20 1000   Tunneling (depth (cm)/location) 0 09/15/20 1000   Undermining (depth (cm)/location) 0 09/15/20 1000   Care Soap and water;Sterile normal saline;Wound cleanser 09/15/20 1000   Dressing Applied;Collagen;Other (see comments);Abd pad 09/15/20 1000   Periwound Care Other (see comments) 09/15/20 1000   Off Loading Football dressing 09/15/20 1000   Dressing Change Due 09/22/20 09/15/20 1000            Wound 05/08/20 1015 Diabetic Ulcer Left distal Foot (Active)   05/08/20 1015    Pre-existing: Yes   Primary Wound Type: Diabetic ulc   Side: Left   Orientation: distal   Location: Foot   Wound Number (optional):    Ankle-Brachial Index:    Pulses:    Removal Indication and Assessment:    Wound Outcome:    (Retired) Wound Type:    (Retired) Wound Length (cm):    (Retired) Wound Width (cm):    (Retired) Depth (cm):    Wound Description (Comments):    Removal Indications:    Wound Image   09/15/20 1000   Wound WDL ex 09/15/20 1000   Dressing Appearance Intact;Moist drainage 09/15/20 1000   Drainage Amount Moderate 09/15/20 1000   Drainage Characteristics/Odor Serous;Green 09/15/20 1000   Appearance Pink;Red;White;Slough 09/15/20 1000   Black (%), Wound Tissue Color 0 % 09/15/20 1000   Red (%), Wound Tissue Color 70 % 09/15/20 1000   Yellow (%), Wound Tissue Color 30 % 09/15/20 1000   Periwound Area Macerated 09/15/20 1000   Wound Edges Irregular 09/15/20 1000   Wound Length (cm) 3 cm 09/15/20 1000   Wound Width (cm) 8.3 cm 09/15/20 1000    Wound Depth (cm) 0.1 cm 09/15/20 1000   Wound Volume (cm^3) 2.49 cm^3 09/15/20 1000   Wound Surface Area (cm^2) 24.9 cm^2 09/15/20 1000   Tunneling (depth (cm)/location) 0 09/15/20 1000   Undermining (depth (cm)/location) 0 09/15/20 1000   Care Soap and water;Sterile normal saline;Wound cleanser 09/15/20 1000   Dressing Applied;Collagen;Other (see comments);Abd pad 09/15/20 1000   Periwound Care Other (see comments) 09/15/20 1000   Off Loading Football dressing 09/15/20 1000   Dressing Change Due 09/22/20 09/15/20 1000            Wound 08/11/20 1051 Diabetic Ulcer Right medial Malleolus/Ankle (Active)   08/11/20 1051    Pre-existing: No   Primary Wound Type: Diabetic ulc   Side: Right   Orientation: medial   Location: Malleolus/Ankle   Wound Number (optional):    Ankle-Brachial Index:    Pulses:    Removal Indication and Assessment:    Wound Outcome:    (Retired) Wound Type:    (Retired) Wound Length (cm):    (Retired) Wound Width (cm):    (Retired) Depth (cm):    Wound Description (Comments):    Removal Indications:    Wound Image   09/15/20 1000   Wound WDL ex 09/15/20 1000   Dressing Appearance Intact;Moist drainage 09/15/20 1000   Drainage Amount Moderate 09/15/20 1000   Drainage Characteristics/Odor Serous 09/15/20 1000   Appearance Pink;Slough;Red 09/15/20 1000   Black (%), Wound Tissue Color 0 % 09/15/20 1000   Red (%), Wound Tissue Color 30 % 09/15/20 1000   Yellow (%), Wound Tissue Color 70 % 09/15/20 1000   Periwound Area Scar tissue;Redness 09/15/20 1000   Wound Edges Defined 09/15/20 1000   Wound Length (cm) 2.1 cm 09/15/20 1000   Wound Width (cm) 2.4 cm 09/15/20 1000   Wound Depth (cm) 0.2 cm 09/15/20 1000   Wound Volume (cm^3) 1.01 cm^3 09/15/20 1000   Wound Surface Area (cm^2) 5.04 cm^2 09/15/20 1000   Tunneling (depth (cm)/location) 0 09/15/20 1000   Undermining (depth (cm)/location) 0 09/15/20 1000   Care Soap and water;Sterile normal saline;Wound cleanser 09/15/20 1000   Dressing Applied;Other  (see comments) 09/15/20 1000   Off Loading Football dressing 09/15/20 1000   Dressing Change Due 09/22/20 09/15/20 1000            Wound 08/11/20 1052 Diabetic Ulcer Right medial Foot (Active)   08/11/20 1052    Pre-existing: No   Primary Wound Type: Diabetic ulc   Side: Right   Orientation: medial   Location: Foot   Wound Number (optional):    Ankle-Brachial Index:    Pulses:    Removal Indication and Assessment:    Wound Outcome:    (Retired) Wound Type:    (Retired) Wound Length (cm):    (Retired) Wound Width (cm):    (Retired) Depth (cm):    Wound Description (Comments):    Removal Indications:    Wound Image   09/15/20 1000   Wound WDL ex 09/15/20 1000   Dressing Appearance Intact;Moist drainage 09/15/20 1000   Drainage Amount Large 09/15/20 1000   Drainage Characteristics/Odor Serous 09/15/20 1000   Appearance Red;White;Slough 09/15/20 1000   Tissue loss description Full thickness 09/15/20 1000   Black (%), Wound Tissue Color 0 % 09/15/20 1000   Red (%), Wound Tissue Color 10 % 09/15/20 1000   Yellow (%), Wound Tissue Color 90 % 09/15/20 1000   Periwound Area Macerated 09/15/20 1000   Wound Edges Irregular 09/15/20 1000   Wound Length (cm) 4 cm 09/15/20 1000   Wound Width (cm) 7 cm 09/15/20 1000   Wound Depth (cm) 1.1 cm 09/15/20 1000   Wound Volume (cm^3) 30.8 cm^3 09/15/20 1000   Wound Surface Area (cm^2) 28 cm^2 09/15/20 1000   Tunneling (depth (cm)/location) 1.2cm depth located at 7 09/15/20 1000   Undermining (depth (cm)/location) 0 09/15/20 1000   Care Soap and water;Sterile normal saline;Wound cleanser 09/15/20 1000   Dressing Applied;Other (see comments) 09/15/20 1000   Periwound Care Other (see comments) 09/15/20 1000   Off Loading Football dressing 09/15/20 1000   Dressing Change Due 09/22/20 09/15/20 1000            Wound 09/15/20 1038 Blister(s) Right medial Leg (Active)   09/15/20 1038    Pre-existing: No   Primary Wound Type: Blister(s)   Side: Right   Orientation: medial   Location: Leg   Wound  Number (optional):    Ankle-Brachial Index:    Pulses:    Removal Indication and Assessment:    Wound Outcome:    (Retired) Wound Type:    (Retired) Wound Length (cm):    (Retired) Wound Width (cm):    (Retired) Depth (cm):    Wound Description (Comments):    Removal Indications:    Wound Image   09/15/20 1000   Wound WDL ex 09/15/20 1000   Dressing Appearance Intact;Moist drainage 09/15/20 1000   Drainage Amount Small 09/15/20 1000   Drainage Characteristics/Odor Serous 09/15/20 1000   Appearance Red;Pink 09/15/20 1000   Tissue loss description Partial thickness 09/15/20 1000   Black (%), Wound Tissue Color 0 % 09/15/20 1000   Red (%), Wound Tissue Color 100 % 09/15/20 1000   Yellow (%), Wound Tissue Color 0 % 09/15/20 1000   Periwound Area Intact 09/15/20 1000   Wound Edges Defined 09/15/20 1000   Wound Length (cm) 6.5 cm 09/15/20 1000   Wound Width (cm) 4.3 cm 09/15/20 1000   Wound Depth (cm) 0.1 cm 09/15/20 1000   Wound Volume (cm^3) 2.8 cm^3 09/15/20 1000   Wound Surface Area (cm^2) 27.95 cm^2 09/15/20 1000   Tunneling (depth (cm)/location) 0 09/15/20 1000   Undermining (depth (cm)/location) 0 09/15/20 1000   Care Soap and water;Sterile normal saline;Wound cleanser 09/15/20 1000   Dressing Applied;Island/border 09/15/20 1000   Dressing Change Due 09/22/20 09/15/20 1000            Incision/Site 06/16/20 0930 Right Toe, third anterior (Active)   06/16/20 0930   Present Prior to Hospital Arrival?: Yes   Side: Right   Location: Toe, third   Orientation: anterior   Incision Type:    Closure Method:    Additional Comments:    Removal Indication and Assessment:    Wound Outcome:    Removal Indications:    Wound Image   09/15/20 1000   Incision WDL ex 09/15/20 1000   Dressing Appearance Intact;Moist drainage 09/15/20 1000   Drainage Amount Small 09/15/20 1000   Drainage Characteristics/Odor Serosanguineous 09/15/20 1000   Appearance Red;White;Bone 09/15/20 1000   Black (%), Wound Tissue Color 0 % 09/15/20 1000   Red  (%), Wound Tissue Color 100 % 09/15/20 1000   Yellow (%), Wound Tissue Color 0 % 09/15/20 1000   Periwound Area Intact 09/15/20 1000   Wound Edges Defined 09/15/20 1000   Wound Length (cm) 0.1 cm 09/15/20 1000   Wound Width (cm) 0.2 cm 09/15/20 1000   Wound Depth (cm) 0.2 cm 09/15/20 1000   Wound Volume (cm^3) 0 cm^3 09/15/20 1000   Wound Surface Area (cm^2) 0.02 cm^2 09/15/20 1000   Tunneling (depth (cm)/location) 0 09/15/20 1000   Undermining (depth (cm)/location) 0 09/15/20 1000   Care Soap and water;Sterile normal saline;Wound cleanser 09/15/20 1000   Dressing Applied;Collagen;Foam 09/15/20 1000   Off Loading Football dressing 09/15/20 1000   Dressing Change Due 09/22/20 09/15/20 1000           Plan:                Orders Placed This Encounter   Procedures    Debridement     This order was created via procedure documentation     Standing Status:   Standing     Number of Occurrences:   1    CT Foot Without Contrast Right     Standing Status:   Future     Standing Expiration Date:   9/15/2021     Order Specific Question:   May the Radiologist modify the order per protocol to meet the clinical needs of the patient?     Answer:   Yes    Change dressing     Left foot- gentian violet periwound, promogran, drawtex cut to fit, ABD pads X2, football dressing (cast padding X2, stockinet). Right medial leg- aquacel foam border. Right medial foot- gentian violet, santyl, drawtex, mextra long X1, mextra short X1, right medial malleolus- santyl, mextra long, right toe third- stacy packed, aquacel foam strip, football dressing (cast padding X2, stockinet).        Follow up in about 1 week (around 9/22/2020) for wound care.       Rubén Davis MD

## 2020-09-16 ENCOUNTER — HOSPITAL ENCOUNTER (OUTPATIENT)
Facility: HOSPITAL | Age: 60
Discharge: HOME | End: 2020-09-16
Attending: RADIOLOGY | Admitting: RADIOLOGY
Payer: MEDICAID

## 2020-09-16 ENCOUNTER — HOSPITAL ENCOUNTER (OUTPATIENT)
Dept: INTERVENTIONAL RADIOLOGY/VASCULAR | Facility: HOSPITAL | Age: 60
Discharge: HOME | End: 2020-09-16
Attending: FAMILY MEDICINE
Payer: MEDICAID

## 2020-09-16 VITALS
RESPIRATION RATE: 19 BRPM | OXYGEN SATURATION: 96 % | TEMPERATURE: 98 F | HEART RATE: 76 BPM | SYSTOLIC BLOOD PRESSURE: 144 MMHG | DIASTOLIC BLOOD PRESSURE: 65 MMHG

## 2020-09-16 VITALS — DIASTOLIC BLOOD PRESSURE: 62 MMHG | SYSTOLIC BLOOD PRESSURE: 133 MMHG

## 2020-09-16 DIAGNOSIS — R18.8 OTHER ASCITES: Primary | ICD-10-CM

## 2020-09-16 DIAGNOSIS — R18.8 OTHER ASCITES: ICD-10-CM

## 2020-09-16 PROCEDURE — 49083 ABD PARACENTESIS W/IMAGING: CPT | Mod: ,,, | Performed by: RADIOLOGY

## 2020-09-16 PROCEDURE — 49083 IR PARACENTESIS NO IMAGING: ICD-10-PCS | Mod: ,,, | Performed by: RADIOLOGY

## 2020-09-16 PROCEDURE — 49083 ABD PARACENTESIS W/IMAGING: CPT

## 2020-09-16 PROCEDURE — P9047 ALBUMIN (HUMAN), 25%, 50ML: HCPCS | Mod: JG | Performed by: RADIOLOGY

## 2020-09-16 PROCEDURE — 63600175 PHARM REV CODE 636 W HCPCS: Mod: JG | Performed by: RADIOLOGY

## 2020-09-16 RX ORDER — ALBUMIN HUMAN 250 G/1000ML
50 SOLUTION INTRAVENOUS ONCE AS NEEDED
Status: CANCELLED | OUTPATIENT
Start: 2020-09-16 | End: 2032-02-13

## 2020-09-16 RX ORDER — HYDROCODONE BITARTRATE AND ACETAMINOPHEN 5; 325 MG/1; MG/1
1 TABLET ORAL EVERY 4 HOURS PRN
Status: DISCONTINUED | OUTPATIENT
Start: 2020-09-16 | End: 2020-09-16 | Stop reason: HOSPADM

## 2020-09-16 RX ORDER — HYDROCODONE BITARTRATE AND ACETAMINOPHEN 5; 325 MG/1; MG/1
1 TABLET ORAL EVERY 4 HOURS PRN
Status: CANCELLED | OUTPATIENT
Start: 2020-09-16

## 2020-09-16 RX ORDER — ALBUMIN HUMAN 250 G/1000ML
50 SOLUTION INTRAVENOUS ONCE AS NEEDED
Status: COMPLETED | OUTPATIENT
Start: 2020-09-16 | End: 2020-09-16

## 2020-09-16 RX ADMIN — ALBUMIN (HUMAN) 50 G: 0.25 INJECTION, SOLUTION INTRAVENOUS at 03:09

## 2020-09-16 NOTE — H&P
Radiology History & Physical      SUBJECTIVE:     Chief Complaint: Recurrent painful, tense ascites     History of Present Illness:  Marvin Ray is a 58 y.o. male with PMHx of CKD III and combined systolic/diastolic CHF complicated by recurrent abdominal distension and pain 2/2 recurrent painful, tense ascites without TTP on PE to suggest SBP requiring frequent  therapeutic LVP.      A new outpatient IR consult placed for US-guided therapeutic large-volume paracentesis.    Past Medical History:   Diagnosis Date    Arthritis     Cellulitis     CKD (chronic kidney disease), stage III     Coronary artery disease     Diabetes mellitus     Diabetic retinopathy     Diabetic ulcer of left foot     Glaucoma     Gout     Hyperlipemia     Hypertension     ICD (implantable cardioverter-defibrillator) in place 11/02/2018    Left chest    Non-pressure chronic ulcer of other part of left foot with fat layer exposed 10/23/2018    PVD (peripheral vascular disease)     Type 2 diabetes mellitus with left diabetic foot ulcer 10/29/2018    Unsteady gait     uses a wheelchair     Past Surgical History:   Procedure Laterality Date    AHMED GLAUCOMA IMPLANT Left 2011    DONE AT [facility]    AORTOGRAPHY WITH SERIALOGRAPHY Left 4/30/2019    Procedure: AORTOGRAM, WITH SERIALOGRAPHY, LEFT LOWER EXTREMITY, POSSIBLE INTERVENTION;  Surgeon: Mitch Chan MD;  Location: [facility];  Service: Vascular;  Laterality: Left;  RN PRE OP 4-23-19.  NO H & P, No Cosent  [place]    BAERVELDT GLAUCOMA IMPLANT Left 2012    WITH CATARACT EXTRACTION//DONE AT [facility]    CARDIAC CATHETERIZATION Left 05/2016    CARDIAC DEFIBRILLATOR PLACEMENT Left 11/02/2018    CATARACT EXTRACTION W/  INTRAOCULAR LENS IMPLANT Left 2012    WITH BAERVEDT//DONE AT [facility]    CATARACT EXTRACTION W/  INTRAOCULAR LENS IMPLANT Right 09/26/2018    COMPLEX ()    CB DESTRUCTION WITH CYCLO G6 Left 02/15/2017        CYST REMOVAL       DEBRIDEMENT OF FOOT  12/28/2018    Procedure: DEBRIDEMENT, FOOT;  Surgeon: Mitch Wall MD;  Location: [facility] OR;  Service: Vascular;;    DEBRIDEMENT OF FOOT  6/5/2019    Procedure: DEBRIDEMENT, FOOT;  Surgeon: Mitch Wall MD;  Location: [facility] OR;  Service: Vascular;;    EXAMINATION UNDER ANESTHESIA Left 12/5/2018    Procedure: Exam under anesthesia, left foot debridement, washout and all other indicated procedures;  Surgeon: Mitch Wall MD;  Location: [facility] OR;  Service: Vascular;  Laterality: Left;  1030AM START  RN PREOP 12/3/2018-----AICD  SEEN BY DR JAMES 12/4    EXAMINATION UNDER ANESTHESIA Left 2/13/2019    Procedure: LEFT FOOT WOUND DEBRIDEMENT, WASHOUT AND ALL OTHER INDICATED PROCEDURES;  Surgeon: Mitch Wall MD;  Location: [facility] OR;  Service: Vascular;  Laterality: Left;  requesting 1st case start  THIS CASE 1ST PER DR WALL ON 2/1/19 @ 844AM -[place]  RN PREOP 2/6/2019  HAS CARDIAC CLEARANCE    EXAMINATION UNDER ANESTHESIA Left 12/28/2018    Procedure: Exam under anesthesia, left foot debridement, left foot washout, possible left second through fifth metatarsal resection;  Surgeon: Mitch Wall MD;  Location: [facility] OR;  Service: Vascular;  Laterality: Left;  1:00pm start per julissa @ 8:58am  RN  PHONE PRE OP 12-27-18--=Pt coming from [facility] [address] [facility]  NEED CONSENT    EXAMINATION UNDER ANESTHESIA Left 6/5/2019    Procedure: LEFT FOOT DEBRIDEMENT, WASHOUT AND ALL OTHER INDICATED PROCEDURES;  Surgeon: Mitch Wall MD;  Location: [facility] OR;  Service: Vascular;  Laterality: Left;  RN PRE OP 5-31-19------ICD------NEED CONSENT    INCISION AND DRAINAGE Left 11/14/2018    Procedure: Incision and Drainage, left lower extremity debridement, washout;  Surgeon: Mitch Wall MD;  Location: [facility] OR;  Service: Vascular;  Laterality: Left;    INCISION AND DRAINAGE Left 11/16/2018    Procedure: INCISION AND DRAINAGE;  Surgeon: Mitch Wall MD;  Location:  [facility] OR;  Service: Vascular;  Laterality: Left;    INTRAOCULAR PROSTHESES INSERTION Right 9/26/2018    Procedure: INSERTION, IOL PROSTHESIS;  Surgeon: Perla Cortés MD;  Location: [address];  Service: Ophthalmology;  Laterality: Right;    PERITONEOCENTESIS N/A 3/1/2019    Procedure: PARACENTESIS, ABDOMINAL, INITIAL;  Surgeon: Dosc Diagnostic Provider;  Location: [facility] OR;  Service: Radiology;  Laterality: N/A;    PERITONEOCENTESIS N/A 3/25/2019    Procedure: PARACENTESIS, ABDOMINAL, INITIAL;  Surgeon: Dosc Diagnostic Provider;  Location: [facility] OR;  Service: Radiology;  Laterality: N/A;    PERITONEOCENTESIS N/A 7/22/2019    Procedure: PARACENTESIS, ABDOMINAL, INITIAL;  Surgeon: Dosc Diagnostic Provider;  Location: [facility] OR;  Service: Radiology;  Laterality: N/A;    PERITONEOCENTESIS N/A 8/16/2019    Procedure: PARACENTESIS, ABDOMINAL, INITIAL;  Surgeon: Dosc Diagnostic Provider;  Location: [facility] OR;  Service: Radiology;  Laterality: N/A;    PERITONEOCENTESIS N/A 9/26/2019    Procedure: PARACENTESIS, ABDOMINAL;  Surgeon: Dosc Diagnostic Provider;  Location: [facility] OR;  Service: Radiology;  Laterality: N/A;    PERITONEOCENTESIS N/A 10/11/2019    Procedure: PARACENTESIS, ABDOMINAL;  Surgeon: Dosc Diagnostic Provider;  Location: [facility] OR;  Service: Radiology;  Laterality: N/A;    PERITONEOCENTESIS N/A 10/31/2019    Procedure: PARACENTESIS, ABDOMINAL;  Surgeon: Dosc Diagnostic Provider;  Location: [facility] OR;  Service: Radiology;  Laterality: N/A;    PERITONEOCENTESIS N/A 11/18/2019    Procedure: PARACENTESIS, ABDOMINAL;  Surgeon: Dosc Diagnostic Provider;  Location: [facility] OR;  Service: Radiology;  Laterality: N/A;    PERITONEOCENTESIS N/A 12/16/2019    Procedure: PARACENTESIS, ABDOMINAL;  Surgeon: Dosc Diagnostic Provider;  Location: [facility] OR;  Service: Radiology;  Laterality: N/A;  2PM START    PERITONEOCENTESIS N/A 2/7/2020    Procedure: PARACENTESIS, ABDOMINAL;  Surgeon: Dosc Diagnostic Provider;   [facility];  Service: Radiology;  Laterality: N/A;  REQUESTED 10:30A START    PERITONEOCENTESIS N/A 2/19/2020    Procedure: PARACENTESIS, ABDOMINAL;  Surgeon: Dosc Diagnostic Provider;  [facility];  Service: Radiology;  Laterality: N/A;    PERITONEOCENTESIS N/A 2/28/2020    Procedure: PARACENTESIS, ABDOMINAL;  Surgeon: Dosc Diagnostic Provider;  [facility];  Service: Radiology;  Laterality: N/A;  REQUESTED 10AM START    PHACOEMULSIFICATION OF CATARACT Right 9/26/2018    Procedure: PHACOEMULSIFICATION, CATARACT;  Surgeon: Perla Cortés MD;  [facility];  Service: Ophthalmology;  Laterality: Right;    REPEAT ABDOMINAL PARACENTESIS N/A 2/11/2019    Procedure: PARACENTESIS, ABDOMINAL, REPEAT;  Surgeon: Dosc Diagnostic Provider;  [facility];  Service: Radiology;  Laterality: N/A;  1PM START    REPEAT ABDOMINAL PARACENTESIS N/A 9/12/2019    Procedure: PARACENTESIS, ABDOMINAL, REPEAT;  Surgeon: Dosc Diagnostic Provider;  [facility];  Service: Radiology;  Laterality: N/A;  9AM START    REPEAT ABDOMINAL PARACENTESIS N/A 12/2/2019    Procedure: PARACENTESIS, ABDOMINAL, REPEAT;  Surgeon: Dosc Diagnostic Provider;  Location[facility];  Service: Radiology;  Laterality: N/A;  3PM START    REPEAT ABDOMINAL PARACENTESIS N/A 1/10/2020    Procedure: PARACENTESIS, ABDOMINAL, REPEAT;  Surgeon: Dosc Diagnostic Provider;  [facility];  Service: Radiology;  Laterality: N/A;  1130AM START    REPEAT ABDOMINAL PARACENTESIS N/A 1/20/2020    Procedure: PARACENTESIS, ABDOMINAL, REPEAT;  Surgeon: Dosc Diagnostic Provider;  [facility];  Service: Radiology;  Laterality: N/A;  1PM START    REPEAT ABDOMINAL PARACENTESIS N/A 1/31/2020    Procedure: PARACENTESIS, ABDOMINAL, REPEAT;  Surgeon: Dosc Diagnostic Provider;  [facility];  Service: Radiology;  Laterality: N/A;  10AM START    REPEAT ABDOMINAL PARACENTESIS N/A 3/16/2020    Procedure: PARACENTESIS, ABDOMINAL,  REPEAT;  Surgeon: Dosc Diagnostic Provider;  Location: [facility] OR;  Service: Radiology;  Laterality: N/A;  10AM START    REPEAT ABDOMINAL PARACENTESIS N/A 3/30/2020    Procedure: PARACENTESIS, ABDOMINAL, REPEAT;  Surgeon: Dosc Diagnostic Provider;  [facility];  Service: Radiology;  Laterality: N/A;    REPEAT ABDOMINAL PARACENTESIS N/A 4/9/2020    Procedure: PARACENTESIS, ABDOMINAL, REPEAT;  Surgeon: Dosc Diagnostic Provider;  [facility];  Service: Radiology;  Laterality: N/A;  1130AM START    REPEAT ABDOMINAL PARACENTESIS N/A 5/8/2020    Procedure: PARACENTESIS, ABDOMINAL, REPEAT;  Surgeon: Dosc Diagnostic Provider;  [facility];  Service: Radiology;  Laterality: N/A;  9AM START    REPEAT ABDOMINAL PARACENTESIS N/A 5/21/2020    Procedure: PARACENTESIS, ABDOMINAL, REPEAT;  Surgeon: Dosc Diagnostic Provider;  Location: [facility] OR;  Service: Radiology;  Laterality: N/A;  10AM START    REPEAT ABDOMINAL PARACENTESIS N/A 6/5/2020    Procedure: PARACENTESIS, ABDOMINAL, REPEAT;  Surgeon: Dosc Diagnostic Provider;  Location: [facility] OR;  Service: Radiology;  Laterality: N/A;  NOON START    REPEAT ABDOMINAL PARACENTESIS N/A 7/10/2020    Procedure: PARACENTESIS, ABDOMINAL, REPEAT;  Surgeon: Dosc Diagnostic Provider;  [facility];  Service: Radiology;  Laterality: N/A;  1PM START    REPEAT ABDOMINAL PARACENTESIS N/A 8/20/2020    Procedure: PARACENTESIS, ABDOMINAL, REPEAT;  Surgeon: Dosc Diagnostic Provider;  Location: [facility] OR;  Service: Radiology;  Laterality: N/A;  1PM START    REPEAT ABDOMINAL PARACENTESIS N/A 9/4/2020    Procedure: PARACENTESIS, ABDOMINAL, REPEAT;  Surgeon: Dos Diagnostic Provider;  [facility];  Service: Radiology;  Laterality: N/A;  11 AM START    REVISION OF PROCEDURE INVOLVING GLAUCOMA DRAINAGE DEVICE Left 10/2/2019    EXTRUDING BAERVELDT /WITH PERICARDIAL PATCH GRAFT ()    Right foot surgery  10/2014    TOE AMPUTATION Right     first and second    TOE  AMPUTATION Left 11/16/2018    Procedure: AMPUTATION, TOES  2-5;  Surgeon: Mitch Chan MD;  Location: [facility] OR;  Service: Vascular;  Laterality: Left;    TOE AMPUTATION Left 12/5/2018    Procedure: AMPUTATION, TOE;  Surgeon: Mitch Chan MD;  Location: [facility] OR;  Service: Vascular;  Laterality: Left;  left great toe    TONSILLECTOMY       Home Meds:   Prior to Admission medications    Medication Sig Start Date End Date Taking? Authorizing Provider   allopurinoL (ZYLOPRIM) 300 MG tablet TAKE 1 TABLET(300 MG) BY MOUTH EVERY DAY 6/15/20   Rubén Davis MD   aspirin (ECOTRIN) 81 MG EC tablet Take 81 mg by mouth once daily.    Historical Provider   bisacodyl (DULCOLAX) 5 mg EC tablet Take 5 mg by mouth daily as needed for Constipation.    Historical Provider   brimonidine 0.2% (ALPHAGAN) 0.2 % Drop Place 1 drop into both eyes 3 (three) times daily. 12/12/19   Perla Cortés MD   carvediloL (COREG) 3.125 MG tablet TAKE 1 TABLET(3.125 MG) BY MOUTH TWICE DAILY 8/17/20   Rubén Davis MD   cephALEXin (KEFLEX) 500 MG capsule Take 1 capsule (500 mg total) by mouth 4 (four) times daily. for 14 days 9/8/20 9/22/20  Dixon West MD   ciprofloxacin HCl (CIPRO) 750 MG tablet Take 1 tablet (750 mg total) by mouth every 12 (twelve) hours. for 14 days 9/8/20 9/22/20  Dixon West MD   coenzyme Q10 100 mg capsule Take 100 mg by mouth every morning.    Historical Provider   dorzolamide-timolol 2-0.5% (COSOPT) 22.3-6.8 mg/mL ophthalmic solution Place 1 drop into both eyes 3 (three) times daily. 12/12/19   Perla Cortés MD   ezetimibe (ZETIA) 10 mg tablet TAKE 1 TABLET(10 MG) BY MOUTH EVERY DAY 7/16/20   Rubén Davis MD   fish oil-omega-3 fatty acids 300-1,000 mg capsule Take 1 capsule by mouth 2 (two) times daily. 1/23/19   Sara Ching MD   gabapentin (NEURONTIN) 100 MG capsule TAKE 2 CAPSULES(200 MG) BY MOUTH EVERY EVENING 7/28/20   Rubén Davis MD   insulin (LANTUS SOLOSTAR U-100  "INSULIN) glargine 100 units/mL (3mL) SubQ pen Inject 35 units once daily 9/11/20   Sheryl Madison NP   liraglutide 0.6 mg/0.1 mL, 18 mg/3 mL, subq PNIJ (VICTOZA 3-MEMO) 0.6 mg/0.1 mL (18 mg/3 mL) PnIj pen Inject 1.8 mg into the skin once daily. 9/11/20 9/11/21  Sheryl Madison NP   MULTIVIT,THER IRON,CA,FA & MIN (MULTIVITAMIN) Tab Take 1 tablet by mouth every morning.     Historical Provider   pen needle, diabetic (BD ULTRA-FINE AMADOR PEN NEEDLE) 32 gauge x 5/32" Ndle 1 Device by Misc.(Non-Drug; Combo Route) route 2 (two) times a day. 9/11/20   Sheryl Madison NP   torsemide (DEMADEX) 20 MG Tab Take 4 tablets (80 mg total) by mouth 2 (two) times daily. 6/16/20 9/11/20  Rubén Davis MD     Anticoagulants/Antiplatelets: no anticoagulation    Allergies:   Review of patient's allergies indicates:   Allergen Reactions    Statins-hmg-coa reductase inhibitors      Generalized Pain    Onglyza [saxagliptin]     Penicillins Rash     Sedation History:  no adverse reactions     Review of Systems:   Hematological: no known coagulopathies  Respiratory: positive for - orthopnea and shortness of breath  Cardiovascular: positive for - dyspnea on exertion, orthopnea and shortness of breath  Gastrointestinal: positive for - abdominal pain and distension  Genito-Urinary: no dysuria, trouble voiding, or hematuria  Musculoskeletal: negative  Neurological: no TIA or stroke symptoms    OBJECTIVE:     Vital Signs (Most Recent)     Physical Exam:  General: no acute distress  Mental Status: alert and oriented to person, place and time  HEENT: normocephalic, atraumatic  Chest: mild labored breathing  Heart: regular heart rate  Abdomen: +distended. No TTP/r/g.  Extremity: moves all extremities    Laboratory  Lab Results   Component Value Date    INR 1.1 05/31/2019       Lab Results   Component Value Date    WBC 10.60 08/25/2020    HGB 11.8 (L) 08/25/2020    HCT 37.4 (L) 08/25/2020    MCV 93 08/25/2020     08/25/2020      Lab Results "   Component Value Date     (H) 08/25/2020     08/25/2020    K 4.2 08/25/2020     08/25/2020    CO2 26 08/25/2020    BUN 44 (H) 08/25/2020    CREATININE 1.3 08/25/2020    CALCIUM 8.3 (L) 08/25/2020    MG 2.1 05/31/2019    ALT 30 08/25/2020    AST 21 08/25/2020    ALBUMIN 2.4 (L) 08/25/2020    BILITOT 0.7 08/25/2020     ASSESSMENT/PLAN:     59 y/o M with CKD III and combined systolic/diastolic CHF complicated by recurrent abdominal distension and pain 2/2 recurrent painful, tense ascites without TTP on PE to suggest SBP, requiring frequent large-volume therapeutic paracentesis.      1. Will attempt U/S-guided therapeutic paracentesis with local anesthetic only and albumin infusion post PRN per institutional protocol.      Risks (including, but not limited to, pain, bleeding, infection, damage to nearby structures, failure to obtain sufficient material for a diagnosis, the need for additional procedures, and death), benefits, and alternatives were discussed with the patient. All questions were answered to the best of my abilities. The patient wishes to proceed with the procedure. Written informed consent was obtained.     Thank you for considering IR for the care of your patient.      Zach Cha MD  Interventional Radiology

## 2020-09-16 NOTE — DISCHARGE SUMMARY
Radiology Discharge Summary      Hospital Course: No complications    Admit Date: 9/16/2020  Discharge Date: 09/16/2020     Instructions Given to Patient: Yes    Diet: Resume prior diet     Activity: activity as tolerated    Description of Condition on Discharge: Stable    Vital Signs (Most Recent): BP: 133/62 (09/16/20 1412)    Discharge Disposition: Home    Discharge Diagnosis:   60 y/o M with h/o recurrent painful, tense ascites s/p successful U/S-guided therapeutic LVP with local anesthetic only and albumin infusion post PRN as indicated per institutional protocol. Patient tolerated the procedure well. No immediate post-procedural complications noted.      Thank you for considering IR for the care of your patient.      Zach Cha MD  Interventional Radiology

## 2020-09-16 NOTE — BRIEF OP NOTE
Radiology Post-Procedure Note     Pre Op Diagnosis: Recurrent painful, tense ascites  Post Op Diagnosis: Same     Procedure: U/S-guided therapeutic LVP     Procedure performed by: Zach Cha MD     Written Informed Consent Obtained: Yes  Specimen Removed: YES, 6700-cc of thin, straw-colored ascitic fluid  Estimated Blood Loss: none     Findings:   1. Successful therapeutic large-volume paracentesis with local anesthetic only and albumin infusion post PRN as indicated per institutional protocol. Patient tolerated the procedure well. No immediate post-procedural complications noted.       Thank you for considering IR for the care of your patient.      Zach Cha MD  Interventional Radiology

## 2020-09-17 NOTE — DISCHARGE INSTRUCTIONS
BATHING:  ? You may shower tomorrow.  DRESSING:  ? Remove dressing tomorrow.        ACTIVITY LEVEL: If you have received sedation or an anesthetic, you may feel sleepy for several hours. Rest until you are more awake. Gradually resume your normal activities    Do not drive, drink alcohol, or sign legal documents for 24 hours, or if taking narcotic pain medication.      DIET: You may resume your home diet. If nausea is present, increase your diet gradually with fluids and bland foods.    Medications: Pain medication should be taken only if needed and as directed. If antibiotics are prescribed, the medication should be taken until completed. You will be given an updated list of you medications.  ? No driving, alcoholic beverages or signing legal documents for next 24 hours if you have had sedation, or while taking pain medication    CALL THE DOCTOR:   For any obvious bleeding (some dried blood over the incision is normal).     Redness, swelling, foul smell around incision or fever over 101.  Shortness of breath.  Persistent pain or nausea not relieved by medication.  Call  [phone]     to speak with an Interventional Radiologist    If any unusual problems or difficulties occur contact your doctor. If you cannot contact your doctor but feel your signs and symptoms warrant a physicians attention return to the emergency room.

## 2020-09-18 ENCOUNTER — TELEPHONE (OUTPATIENT)
Dept: FAMILY MEDICINE | Facility: CLINIC | Age: 60
End: 2020-09-18

## 2020-09-18 DIAGNOSIS — E11.621 DIABETIC ULCER OF TOE OF RIGHT FOOT ASSOCIATED WITH TYPE 2 DIABETES MELLITUS, WITH BONE INVOLVEMENT WITHOUT EVIDENCE OF NECROSIS: Primary | ICD-10-CM

## 2020-09-18 DIAGNOSIS — L97.516 DIABETIC ULCER OF TOE OF RIGHT FOOT ASSOCIATED WITH TYPE 2 DIABETES MELLITUS, WITH BONE INVOLVEMENT WITHOUT EVIDENCE OF NECROSIS: Primary | ICD-10-CM

## 2020-09-18 RX ORDER — ACETAMINOPHEN AND CODEINE PHOSPHATE 300; 30 MG/1; MG/1
1 TABLET ORAL EVERY 6 HOURS PRN
Qty: 28 TABLET | Refills: 0 | Status: SHIPPED | OUTPATIENT
Start: 2020-09-18 | End: 2020-09-28

## 2020-09-18 NOTE — TELEPHONE ENCOUNTER
----- Message from Ad Berrios sent at 9/18/2020 12:22 PM CDT -----  Regarding: medication  Type: Patient Call Back    Who called:Chloé(sister)    What is the request in detail:Chloé, sister of the patient is requesting a call back from the staff. She stated that Dr. Davis was supposed to put a medication to help take the edge off of the patient's foot pain. But nothing has not been sent as of yet.    Can the clinic reply by [facility]?no    Would the patient rather a call back or a response via [facility]? Call back     Best call back number:[phone]

## 2020-09-21 ENCOUNTER — TELEPHONE (OUTPATIENT)
Dept: ENDOCRINOLOGY | Facility: CLINIC | Age: 60
End: 2020-09-21

## 2020-09-21 NOTE — TELEPHONE ENCOUNTER
----- Message from Karely Ruffin MA sent at 9/21/2020  8:21 AM CDT -----  Regarding: FW: PA for medication    ----- Message -----  From: Ad Berrios  Sent: 9/18/2020  12:20 PM CDT  To: [facility] Staff  Subject: PA for medication                                Type: Patient Call Back    Who called:Chloé (sister)    What is the request in detail: Chloé, sister of the patient is calling to check on the status of a PA for : exulti.    Can the clinic reply by [facility]?no    Would the patient rather a call back or a response via [facility]? Call back    Best call back number:[phone]

## 2020-09-21 NOTE — TELEPHONE ENCOUNTER
Pt's sister reports pt was able to obtain Lantus but not Victoza 3 pack. Suspect pt needs to try and fail 2 pack first at 1.2 mg daily first. She also reports his BGs have been doing better and last BG was 117. She suspects infection is improving and help BG control. Discussed continuing Xultophy at this time (holding off on switching to Lantus and Victoza). She will report back on his BGs next week.

## 2020-09-22 ENCOUNTER — HOSPITAL ENCOUNTER (OUTPATIENT)
Dept: WOUND CARE | Facility: HOSPITAL | Age: 60
Discharge: HOME | End: 2020-09-22
Attending: FAMILY MEDICINE
Payer: MEDICAID

## 2020-09-22 ENCOUNTER — OFFICE VISIT (OUTPATIENT)
Dept: INFECTIOUS DISEASES | Facility: CLINIC | Age: 60
End: 2020-09-22
Payer: MEDICAID

## 2020-09-22 VITALS
TEMPERATURE: 97 F | RESPIRATION RATE: 18 BRPM | DIASTOLIC BLOOD PRESSURE: 53 MMHG | SYSTOLIC BLOOD PRESSURE: 111 MMHG | HEART RATE: 80 BPM

## 2020-09-22 VITALS
SYSTOLIC BLOOD PRESSURE: 116 MMHG | HEART RATE: 75 BPM | WEIGHT: 204.38 LBS | DIASTOLIC BLOOD PRESSURE: 66 MMHG | TEMPERATURE: 98 F | BODY MASS INDEX: 29.32 KG/M2

## 2020-09-22 DIAGNOSIS — M86.271 SUBACUTE OSTEOMYELITIS OF RIGHT FOOT: Primary | ICD-10-CM

## 2020-09-22 DIAGNOSIS — L97.516 DIABETIC ULCER OF TOE OF RIGHT FOOT ASSOCIATED WITH TYPE 2 DIABETES MELLITUS, WITH BONE INVOLVEMENT WITHOUT EVIDENCE OF NECROSIS: ICD-10-CM

## 2020-09-22 DIAGNOSIS — L97.919 DIABETIC ULCER OF RIGHT LOWER LEG: ICD-10-CM

## 2020-09-22 DIAGNOSIS — E11.9 TYPE 2 DIABETES MELLITUS WITHOUT COMPLICATION, UNSPECIFIED WHETHER LONG TERM INSULIN USE: ICD-10-CM

## 2020-09-22 DIAGNOSIS — E11.621 DIABETIC ULCER OF TOE OF RIGHT FOOT ASSOCIATED WITH TYPE 2 DIABETES MELLITUS, WITH BONE INVOLVEMENT WITHOUT EVIDENCE OF NECROSIS: ICD-10-CM

## 2020-09-22 DIAGNOSIS — L97.529 TYPE 2 DIABETES MELLITUS WITH LEFT DIABETIC FOOT ULCER: Primary | ICD-10-CM

## 2020-09-22 DIAGNOSIS — E11.622 DIABETIC ULCER OF RIGHT LOWER LEG: ICD-10-CM

## 2020-09-22 DIAGNOSIS — E11.621 TYPE 2 DIABETES MELLITUS WITH LEFT DIABETIC FOOT ULCER: Primary | ICD-10-CM

## 2020-09-22 PROCEDURE — 99999 PR PBB SHADOW E&M-EST. PATIENT-LVL III: CPT | Mod: PBBFAC,,, | Performed by: INTERNAL MEDICINE

## 2020-09-22 PROCEDURE — 11045 DBRDMT SUBQ TISS EACH ADDL: CPT | Performed by: FAMILY MEDICINE

## 2020-09-22 PROCEDURE — 99999 PR PBB SHADOW E&M-EST. PATIENT-LVL III: ICD-10-PCS | Mod: PBBFAC,,, | Performed by: INTERNAL MEDICINE

## 2020-09-22 PROCEDURE — 99214 OFFICE O/P EST MOD 30 MIN: CPT | Mod: ,,, | Performed by: FAMILY MEDICINE

## 2020-09-22 PROCEDURE — 11042 DBRDMT SUBQ TIS 1ST 20SQCM/<: CPT | Mod: ,,, | Performed by: FAMILY MEDICINE

## 2020-09-22 PROCEDURE — 11045 DBRDMT SUBQ TISS EACH ADDL: CPT | Mod: ,,, | Performed by: FAMILY MEDICINE

## 2020-09-22 PROCEDURE — 99214 OFFICE O/P EST MOD 30 MIN: CPT | Mod: S$PBB,,, | Performed by: INTERNAL MEDICINE

## 2020-09-22 PROCEDURE — 99214 PR OFFICE/OUTPT VISIT, EST, LEVL IV, 30-39 MIN: ICD-10-PCS | Mod: ,,, | Performed by: FAMILY MEDICINE

## 2020-09-22 PROCEDURE — 99214 PR OFFICE/OUTPT VISIT, EST, LEVL IV, 30-39 MIN: ICD-10-PCS | Mod: S$PBB,,, | Performed by: INTERNAL MEDICINE

## 2020-09-22 PROCEDURE — 11045 DEBRIDEMENT: ICD-10-PCS | Mod: ,,, | Performed by: FAMILY MEDICINE

## 2020-09-22 PROCEDURE — 99213 OFFICE O/P EST LOW 20 MIN: CPT | Mod: PBBFAC,25 | Performed by: INTERNAL MEDICINE

## 2020-09-22 PROCEDURE — 27201912 HC WOUND CARE DEBRIDEMENT SUPPLIES: Performed by: FAMILY MEDICINE

## 2020-09-22 PROCEDURE — 11042 DEBRIDEMENT: ICD-10-PCS | Mod: ,,, | Performed by: FAMILY MEDICINE

## 2020-09-22 PROCEDURE — 11042 DBRDMT SUBQ TIS 1ST 20SQCM/<: CPT | Performed by: FAMILY MEDICINE

## 2020-09-22 RX ORDER — CEPHALEXIN 500 MG/1
500 CAPSULE ORAL 4 TIMES DAILY
Qty: 56 CAPSULE | Refills: 0 | Status: SHIPPED | OUTPATIENT
Start: 2020-09-22 | End: 2020-10-06

## 2020-09-22 RX ORDER — CIPROFLOXACIN 750 MG/1
750 TABLET, FILM COATED ORAL EVERY 12 HOURS
Qty: 28 TABLET | Refills: 0 | Status: SHIPPED | OUTPATIENT
Start: 2020-09-22 | End: 2020-10-06

## 2020-09-22 NOTE — PROGRESS NOTES
[facility]                Progress Note    Subjective:       Patient ID: Marvin Ray is a 60 y.o. male.    Chief Complaint: Non-healing Wound Follow Up and Diabetic Foot Ulcer    09/22/2020: F/U visit. Wounds are worsening on right foot due to maceration from excess drainage. Pt reports pain 6/10 to the right foot/heel due to right medial foot wound. Right toe third measuring bigger and bone exposed. Right medial foot measuring bigger and 95% slough. Macerated periwounds. Debridement performed.      Review of Systems   Constitutional: Negative.    HENT: Negative.    Respiratory: Negative.    Cardiovascular: Positive for leg swelling.   Gastrointestinal: Negative.    Musculoskeletal: Positive for myalgias. Negative for arthralgias and joint swelling.   Skin: Positive for wound.   Psychiatric/Behavioral: Negative.          Objective:        Physical Exam  Constitutional:       Appearance: Normal appearance.   HENT:      Head: Normocephalic and atraumatic.      Right Ear: External ear normal.      Left Ear: External ear normal.      Nose: Nose normal.      Mouth/Throat:      Mouth: Mucous membranes are moist.      Pharynx: Oropharynx is clear.   Eyes:      Conjunctiva/sclera: Conjunctivae normal.      Pupils: Pupils are equal, round, and reactive to light.   Cardiovascular:      Rate and Rhythm: Normal rate and regular rhythm.   Pulmonary:      Effort: Pulmonary effort is normal. No respiratory distress.      Breath sounds: No wheezing.   Abdominal:      General: There is no distension.      Palpations: Abdomen is soft.      Tenderness: There is no abdominal tenderness.   Musculoskeletal:      Right lower leg: Edema present.      Left lower leg: Edema present.   Skin:     General: Skin is warm and dry.      Comments: +right foot DFU's with excess maceration, slough requiring debridement  +left foot DFU's have some slough, but no debridement necessary this week  "  Neurological:      Mental Status: He is alert and oriented to person, place, and time. Mental status is at baseline.           Vitals:    09/22/20 1028   BP: (!) 111/53   Pulse: 80   Resp: 18   Temp: 97.2 °F (36.2 °C)     Debridement    Date/Time: 9/22/2020 9:19 PM  Performed by: Rubén Davis MD  Authorized by: Rubén Davis MD     Time out: Immediately prior to procedure a "time out" was called to verify the correct patient, procedure, equipment, support staff and site/side marked as required.    Consent Done?:  Yes (Written)  Local anesthesia used?: No      Wound Details:    Location:  Right foot    Location:  Right Midfoot       Length (cm):  5       Area (sq cm):  51.5       Width (cm):  10.3       Percent Debrided (%):  50       Depth (cm):  1.8       Total Area Debrided (sq cm):  25.75    Depth of debridement:  Subcutaneous tissue    Tissue debrided:  Dermis, Epidermis and Subcutaneous    Devitalized tissue debrided:  Biofilm, Fibrin and Slough    Instruments:  Curette    Bleeding:  Minimal  Hemostasis Achieved: Yes    Method Used:  Pressure  Patient tolerance:  Patient tolerated the procedure well with no immediate complications      Assessment:           ICD-10-CM ICD-9-CM   1. Type 2 diabetes mellitus with left diabetic foot ulcer  E11.621 250.80    L97.529 707.15   2. Diabetic ulcer of right lower leg  E11.622 250.80    L97.919 707.10   3. Diabetic ulcer of toe of right foot associated with type 2 diabetes mellitus, with bone involvement without evidence of necrosis  E11.621 250.80    L97.516 707.15            Wound 11/02/18 Diabetic Ulcer Left medial Foot (Active)   11/02/18     Pre-existing: Yes   Primary Wound Type: Diabetic ulc   Side: Left   Orientation: medial   Location: Foot   Wound Number (optional):    Ankle-Brachial Index:    Pulses:    Removal Indication and Assessment:    Wound Outcome:    (Retired) Wound Type:    (Retired) Wound Length (cm):    (Retired) Wound Width (cm):  "   (Retired) Depth (cm):    Wound Description (Comments):    Removal Indications:    Wound Image   09/22/20 1000   Wound WDL ex 09/22/20 1000   Dressing Appearance Intact;Moist drainage 09/22/20 1000   Drainage Amount Moderate 09/22/20 1000   Drainage Characteristics/Odor Serosanguineous 09/22/20 1000   Appearance Red;Pink;White;Slough 09/22/20 1000   Tissue loss description Full thickness 09/22/20 1000   Black (%), Wound Tissue Color 0 % 09/22/20 1000   Red (%), Wound Tissue Color 20 % 09/22/20 1000   Yellow (%), Wound Tissue Color 80 % 09/22/20 1000   Periwound Area Macerated 09/22/20 1000   Wound Edges Irregular 09/22/20 1000   Wound Length (cm) 2.9 cm 09/22/20 1000   Wound Width (cm) 7.7 cm 09/22/20 1000   Wound Depth (cm) 0.1 cm 09/22/20 1000   Wound Volume (cm^3) 2.23 cm^3 09/22/20 1000   Wound Surface Area (cm^2) 22.33 cm^2 09/22/20 1000   Tunneling (depth (cm)/location) 0 09/22/20 1000   Undermining (depth (cm)/location) 0 09/22/20 1000   Care Soap and water;Wound cleanser;Sterile normal saline 09/22/20 1000   Dressing Applied;Collagen;Abd pad 09/22/20 1000   Periwound Care Other (see comments) 09/22/20 1000   Off Loading Football dressing 09/22/20 1000   Dressing Change Due 09/29/20 09/22/20 1000            Wound 05/08/20 1015 Diabetic Ulcer Left distal Foot (Active)   05/08/20 1015    Pre-existing: Yes   Primary Wound Type: Diabetic ulc   Side: Left   Orientation: distal   Location: Foot   Wound Number (optional):    Ankle-Brachial Index:    Pulses:    Removal Indication and Assessment:    Wound Outcome:    (Retired) Wound Type:    (Retired) Wound Length (cm):    (Retired) Wound Width (cm):    (Retired) Depth (cm):    Wound Description (Comments):    Removal Indications:    Wound Image   09/22/20 1000   Wound WDL ex 09/22/20 1000   Dressing Appearance Intact;Moist drainage 09/22/20 1000   Drainage Amount Moderate 09/22/20 1000   Drainage Characteristics/Odor Serosanguineous 09/22/20 1000   Appearance  Red;Pink;White;Slough 09/22/20 1000   Tissue loss description Full thickness 09/22/20 1000   Black (%), Wound Tissue Color 0 % 09/22/20 1000   Red (%), Wound Tissue Color 30 % 09/22/20 1000   Yellow (%), Wound Tissue Color 70 % 09/22/20 1000   Periwound Area Macerated 09/22/20 1000   Wound Edges Irregular 09/22/20 1000   Wound Length (cm) 2.6 cm 09/22/20 1000   Wound Width (cm) 7 cm 09/22/20 1000   Wound Depth (cm) 0.2 cm 09/22/20 1000   Wound Volume (cm^3) 3.64 cm^3 09/22/20 1000   Wound Surface Area (cm^2) 18.2 cm^2 09/22/20 1000   Tunneling (depth (cm)/location) 0 09/22/20 1000   Undermining (depth (cm)/location) 0 09/22/20 1000   Care Soap and water;Sterile normal saline;Wound cleanser 09/22/20 1000   Dressing Applied;Collagen;Abd pad 09/22/20 1000   Periwound Care Other (see comments) 09/22/20 1000   Off Loading Football dressing 09/22/20 1000   Dressing Change Due 09/29/20 09/22/20 1000            Wound 08/11/20 1051 Diabetic Ulcer Right medial Malleolus/Ankle (Active)   08/11/20 1051    Pre-existing: No   Primary Wound Type: Diabetic ulc   Side: Right   Orientation: medial   Location: Malleolus/Ankle   Wound Number (optional):    Ankle-Brachial Index:    Pulses:    Removal Indication and Assessment:    Wound Outcome:    (Retired) Wound Type:    (Retired) Wound Length (cm):    (Retired) Wound Width (cm):    (Retired) Depth (cm):    Wound Description (Comments):    Removal Indications:    Wound Image   09/22/20 1000   Wound WDL ex 09/22/20 1000   Dressing Appearance Intact;Moist drainage 09/22/20 1000   Drainage Amount Moderate 09/22/20 1000   Drainage Characteristics/Odor Serous 09/22/20 1000   Appearance Red;Pink;Slough 09/22/20 1000   Tissue loss description Full thickness 09/22/20 1000   Black (%), Wound Tissue Color 5 % 09/22/20 1000   Red (%), Wound Tissue Color 60 % 09/22/20 1000   Yellow (%), Wound Tissue Color 35 % 09/22/20 1000   Periwound Area Redness 09/22/20 1000   Wound Edges Defined 09/22/20 1000    Wound Length (cm) 2 cm 09/22/20 1000   Wound Width (cm) 2.4 cm 09/22/20 1000   Wound Depth (cm) 0.2 cm 09/22/20 1000   Wound Volume (cm^3) 0.96 cm^3 09/22/20 1000   Wound Surface Area (cm^2) 4.8 cm^2 09/22/20 1000   Tunneling (depth (cm)/location) 0 09/22/20 1000   Undermining (depth (cm)/location) 0 09/22/20 1000   Care Soap and water;Sterile normal saline;Wound cleanser 09/22/20 1000   Dressing Applied;Collagen;Other (see comments) 09/22/20 1000   Off Loading Football dressing 09/22/20 1000   Dressing Change Due 09/29/20 09/22/20 1000            Wound 08/11/20 1052 Diabetic Ulcer Right medial Foot (Active)   08/11/20 1052    Pre-existing: No   Primary Wound Type: Diabetic ulc   Side: Right   Orientation: medial   Location: Foot   Wound Number (optional):    Ankle-Brachial Index:    Pulses:    Removal Indication and Assessment:    Wound Outcome:    (Retired) Wound Type:    (Retired) Wound Length (cm):    (Retired) Wound Width (cm):    (Retired) Depth (cm):    Wound Description (Comments):    Removal Indications:    Wound Image   09/22/20 1000   Wound WDL ex 09/22/20 1000   Dressing Appearance Intact;Moist drainage 09/22/20 1000   Drainage Amount Large 09/22/20 1000   Drainage Characteristics/Odor Serosanguineous 09/22/20 1000   Appearance Red;Green;Slough;White 09/22/20 1000   Tissue loss description Full thickness 09/22/20 1000   Black (%), Wound Tissue Color 0 % 09/22/20 1000   Red (%), Wound Tissue Color 10 % 09/22/20 1000   Yellow (%), Wound Tissue Color 90 % 09/22/20 1000   Periwound Area Macerated 09/22/20 1000   Wound Edges Irregular 09/22/20 1000   Wound Length (cm) 5.4 cm 09/22/20 1000   Wound Width (cm) 10.3 cm 09/22/20 1000   Wound Depth (cm) 1.8 cm 09/22/20 1000   Wound Volume (cm^3) 100.12 cm^3 09/22/20 1000   Wound Surface Area (cm^2) 55.62 cm^2 09/22/20 1000   Tunneling (depth (cm)/location) 1.1cm depth located at 7 09/22/20 1000   Undermining (depth (cm)/location) 0 09/22/20 1000   Care Soap and  water;Sterile normal saline;Wound cleanser 09/22/20 1000   Dressing Applied;Other (see comments);Abd pad 09/22/20 1000   Periwound Care Other (see comments) 09/22/20 1000   Off Loading Football dressing 09/22/20 1000   Dressing Change Due 09/29/20 09/22/20 1000            Wound 09/15/20 1038 Blister(s) Right medial Leg (Active)   09/15/20 1038    Pre-existing: No   Primary Wound Type: Blister(s)   Side: Right   Orientation: medial   Location: Leg   Wound Number (optional):    Ankle-Brachial Index:    Pulses:    Removal Indication and Assessment:    Wound Outcome:    (Retired) Wound Type:    (Retired) Wound Length (cm):    (Retired) Wound Width (cm):    (Retired) Depth (cm):    Wound Description (Comments):    Removal Indications:    Wound Image   09/22/20 1000   Wound WDL ex 09/22/20 1000   Dressing Appearance Intact;Moist drainage 09/22/20 1000   Drainage Amount Moderate 09/22/20 1000   Drainage Characteristics/Odor Serous 09/22/20 1000   Appearance Pink;Red 09/22/20 1000   Tissue loss description Partial thickness 09/22/20 1000   Black (%), Wound Tissue Color 0 % 09/22/20 1000   Red (%), Wound Tissue Color 100 % 09/22/20 1000   Yellow (%), Wound Tissue Color 0 % 09/22/20 1000   Periwound Area Redness 09/22/20 1000   Wound Edges Defined 09/22/20 1000   Wound Length (cm) 3.7 cm 09/22/20 1000   Wound Width (cm) 4 cm 09/22/20 1000   Wound Depth (cm) 0.1 cm 09/22/20 1000   Wound Volume (cm^3) 1.48 cm^3 09/22/20 1000   Wound Surface Area (cm^2) 14.8 cm^2 09/22/20 1000   Tunneling (depth (cm)/location) 0 09/22/20 1000   Undermining (depth (cm)/location) 0 09/22/20 1000   Care Soap and water;Sterile normal saline;Wound cleanser 09/22/20 1000   Dressing Applied;Island/border 09/22/20 1000   Dressing Change Due 09/29/20 09/22/20 1000            Incision/Site 06/16/20 0930 Right Toe, third anterior (Active)   06/16/20 0930   Present Prior to Hospital Arrival?: Yes   Side: Right   Location: Toe, third   Orientation: anterior    Incision Type:    Closure Method:    Additional Comments:    Removal Indication and Assessment:    Wound Outcome:    Removal Indications:    Wound Image   09/22/20 1000   Incision WDL ex 09/22/20 1000   Dressing Appearance Intact;Moist drainage 09/22/20 1000   Drainage Amount Small 09/22/20 1000   Drainage Characteristics/Odor Serosanguineous 09/22/20 1000   Appearance Bone 09/22/20 1000   Black (%), Wound Tissue Color 0 % 09/22/20 1000   Red (%), Wound Tissue Color 0 % 09/22/20 1000   Yellow (%), Wound Tissue Color 0 % 09/22/20 1000   Periwound Area Intact 09/22/20 1000   Wound Edges Defined 09/22/20 1000   Wound Length (cm) 0.2 cm 09/22/20 1000   Wound Width (cm) 0.5 cm 09/22/20 1000   Wound Depth (cm) 0.3 cm 09/22/20 1000   Wound Volume (cm^3) 0.03 cm^3 09/22/20 1000   Wound Surface Area (cm^2) 0.1 cm^2 09/22/20 1000   Tunneling (depth (cm)/location) 0 09/22/20 1000   Undermining (depth (cm)/location) 0 09/22/20 1000   Care Soap and water;Sterile normal saline;Wound cleanser 09/22/20 1000   Dressing Applied;Other (see comments);Collagen;Abd pad 09/22/20 1000   Off Loading Football dressing 09/22/20 1000   Dressing Change Due 09/29/20 09/22/20 1000           Plan:                Orders Placed This Encounter   Procedures    Debridement     This order was created via procedure documentation     Standing Status:   Standing     Number of Occurrences:   1    Change dressing     Left foot- gentian violet periwound, stacy to wound bed, ABD pads X2, football dressing (cast padding X2, stockinet). Right medial leg- aquacel foam border. Right toe third- cutimed, endoform, ABD pad over toes, right malleolus- endoform, mextra short X1, right medial foot- iodosorb packed, HB transfer, drawtex, mextra long X1, ABD pad X1, football dressing (cast padding X2, stockinet).        Follow up in about 1 week (around 9/29/2020) for wound care.     Rubén Davis MD

## 2020-09-27 NOTE — PROGRESS NOTES
Subjective:      Patient ID: Marvin Ray is a 60 y.o. male.    Chief Complaint:No chief complaint on file.      History of Present Illness    59 y/o with a history of DM type 2, CAD with CHF s/p AICD placement.  He also has a history of diabetic foot ulcer and osteomyelitis.  He was treated by myself earlier this year.  He is referred to me for evaluation of a foot ulcer.  Cultures were positive for pseudomonas and group b strep.  At his last visit, he was placed on cefadroxil and ciprofloxacin. The foot ulcer is improved.    Review of Systems   Constitution: Negative for chills, decreased appetite, fever, malaise/fatigue, night sweats, weight gain and weight loss.   HENT: Negative for congestion, ear pain, hearing loss, hoarse voice, sore throat and tinnitus.    Eyes: Negative for blurred vision, redness and visual disturbance.   Cardiovascular: Positive for leg swelling. Negative for chest pain and palpitations.   Respiratory: Negative for cough, hemoptysis, shortness of breath and sputum production.    Hematologic/Lymphatic: Negative for adenopathy. Does not bruise/bleed easily.   Skin: Positive for itching. Negative for dry skin, rash and suspicious lesions.   Musculoskeletal: Negative for back pain, joint pain, myalgias and neck pain.   Gastrointestinal: Negative for abdominal pain, constipation, diarrhea, heartburn, nausea and vomiting.   Genitourinary: Negative for dysuria, flank pain, frequency, hematuria, hesitancy and urgency.   Neurological: Negative for dizziness, headaches, numbness, paresthesias and weakness.   Psychiatric/Behavioral: Negative for depression and memory loss. The patient does not have insomnia and is not nervous/anxious.      Objective:   Physical Exam  Vitals signs and nursing note reviewed.   Constitutional:       General: He is not in acute distress.     Appearance: He is well-developed. He is not diaphoretic.   HENT:      Head: Normocephalic and atraumatic.      Right Ear:  External ear normal.      Left Ear: External ear normal.      Nose: Nose normal.      Mouth/Throat:      Pharynx: No oropharyngeal exudate.   Eyes:      General: No scleral icterus.        Right eye: No discharge.         Left eye: No discharge.      Conjunctiva/sclera: Conjunctivae normal.      Pupils: Pupils are equal, round, and reactive to light.   Neck:      Musculoskeletal: Normal range of motion and neck supple.      Thyroid: No thyromegaly.      Vascular: No JVD.      Trachea: No tracheal deviation.   Cardiovascular:      Rate and Rhythm: Normal rate and regular rhythm.      Heart sounds: No murmur. No friction rub. No gallop.    Pulmonary:      Effort: Pulmonary effort is normal. No respiratory distress.      Breath sounds: Normal breath sounds. No stridor. No wheezing or rales.   Chest:      Chest wall: No tenderness.   Abdominal:      General: Bowel sounds are normal. There is no distension.      Palpations: Abdomen is soft. There is no mass.      Tenderness: There is no abdominal tenderness. There is no guarding or rebound.   Musculoskeletal: Normal range of motion.         General: No tenderness.   Lymphadenopathy:      Cervical: No cervical adenopathy.   Skin:     General: Skin is warm.      Coloration: Skin is not pale.      Findings: No erythema or rash.   Neurological:      Mental Status: He is alert and oriented to person, place, and time.      Cranial Nerves: No cranial nerve deficit.      Motor: No abnormal muscle tone.      Coordination: Coordination normal.      Deep Tendon Reflexes: Reflexes are normal and symmetric. Reflexes normal.   Psychiatric:         Behavior: Behavior normal.         Thought Content: Thought content normal.         Judgment: Judgment normal.                               Assessment:       1. Subacute osteomyelitis of right foot :  Cultures were positive for pseudomonas and group b strep.  He is s/p 2 weeks of cefadroxil/ciprofloxacin followed by 2 weeks of oral  keflex/ciprofloxacin.  He has completed around 4 weeks of antibiotics now.  I will give him another 2 weeks of antibiotics to complete 6 week course.  After which, the antibiotics should be discontinued.  If his wounds fail to heal, repeat cultures should be obtained with him off of antibiotics.     2. Diabetic ulcer of toe of right foot associated with type 2 diabetes mellitus:  As above.   3. Type 2 diabetes mellitus without complication, unspecified whether long term insulin use :  Management per his primary care.         Plan:       Subacute osteomyelitis of right foot    Diabetic ulcer of toe of right foot associated with type 2 diabetes mellitus, with bone involvement without evidence of necrosis  -     cephALEXin (KEFLEX) 500 MG capsule; Take 1 capsule (500 mg total) by mouth 4 (four) times daily. for 14 days  Dispense: 56 capsule; Refill: 0  -     ciprofloxacin HCl (CIPRO) 750 MG tablet; Take 1 tablet (750 mg total) by mouth every 12 (twelve) hours. for 14 days  Dispense: 28 tablet; Refill: 0  -     Comprehensive metabolic panel; Future; Expected date: 09/22/2020  -     CBC auto differential; Future; Expected date: 09/22/2020  -     C-reactive protein; Future; Expected date: 09/22/2020    Type 2 diabetes mellitus without complication, unspecified whether long term insulin use

## 2020-09-28 ENCOUNTER — HOSPITAL ENCOUNTER (OUTPATIENT)
Dept: INTERVENTIONAL RADIOLOGY/VASCULAR | Facility: HOSPITAL | Age: 60
Discharge: HOME | End: 2020-09-28
Attending: FAMILY MEDICINE | Admitting: RADIOLOGY
Payer: MEDICAID

## 2020-09-28 ENCOUNTER — HOSPITAL ENCOUNTER (OUTPATIENT)
Facility: HOSPITAL | Age: 60
Discharge: HOME | End: 2020-09-28
Attending: RADIOLOGY | Admitting: RADIOLOGY
Payer: MEDICAID

## 2020-09-28 VITALS — SYSTOLIC BLOOD PRESSURE: 112 MMHG | DIASTOLIC BLOOD PRESSURE: 64 MMHG

## 2020-09-28 DIAGNOSIS — R18.8 OTHER ASCITES: ICD-10-CM

## 2020-09-28 PROCEDURE — A7048 VACUUM DRAIN BOTTLE/TUBE KIT: HCPCS

## 2020-09-28 PROCEDURE — 49083 IR PARACENTESIS NO IMAGING: ICD-10-PCS | Mod: ,,, | Performed by: RADIOLOGY

## 2020-09-28 PROCEDURE — 49083 ABD PARACENTESIS W/IMAGING: CPT

## 2020-09-28 PROCEDURE — 49083 ABD PARACENTESIS W/IMAGING: CPT | Mod: ,,, | Performed by: RADIOLOGY

## 2020-09-28 NOTE — H&P
Radiology History & Physical      SUBJECTIVE:     Chief Complaint: Recurrent painful, tense ascites     History of Present Illness:  Marvin Ray is a 58 y.o. male with PMHx of CKD III and combined systolic/diastolic CHF complicated by recurrent abdominal distension and pain 2/2 recurrent painful, tense ascites without TTP on PE to suggest SBP requiring frequent  therapeutic LVP.      A new outpatient IR consult placed for US-guided therapeutic large-volume paracentesis.    Past Medical History:   Diagnosis Date    Arthritis     Cellulitis     CKD (chronic kidney disease), stage III     Coronary artery disease     Diabetes mellitus     Diabetic retinopathy     Diabetic ulcer of left foot     Glaucoma     Gout     Hyperlipemia     Hypertension     ICD (implantable cardioverter-defibrillator) in place 11/02/2018    Left chest    Non-pressure chronic ulcer of other part of left foot with fat layer exposed 10/23/2018    PVD (peripheral vascular disease)     Type 2 diabetes mellitus with left diabetic foot ulcer 10/29/2018    Unsteady gait     uses a wheelchair     Past Surgical History:   Procedure Laterality Date    AHMED GLAUCOMA IMPLANT Left 2011    DONE AT [facility]    AORTOGRAPHY WITH SERIALOGRAPHY Left 4/30/2019    Procedure: AORTOGRAM, WITH SERIALOGRAPHY, LEFT LOWER EXTREMITY, POSSIBLE INTERVENTION;  Surgeon: Mitch Chan MD;  Location: [facility] [address];  Service: Vascular;  Laterality: Left;  RN PRE OP 4-23-19.  NO H & P, No Cosent  [place]    BAERVELDT GLAUCOMA IMPLANT Left 2012    WITH CATARACT EXTRACTION//DONE AT [facility]    CARDIAC CATHETERIZATION Left 05/2016    CARDIAC DEFIBRILLATOR PLACEMENT Left 11/02/2018    CATARACT EXTRACTION W/  INTRAOCULAR LENS IMPLANT Left 2012    WITH BAERVEDT//DONE AT [facility]    CATARACT EXTRACTION W/  INTRAOCULAR LENS IMPLANT Right 09/26/2018    COMPLEX ()    CB DESTRUCTION WITH CYCLO G6 Left 02/15/2017        CYST REMOVAL       DEBRIDEMENT OF FOOT  12/28/2018    Procedure: DEBRIDEMENT, FOOT;  Surgeon: Mitch Wall MD;  Location: [facility] OR;  Service: Vascular;;    DEBRIDEMENT OF FOOT  6/5/2019    Procedure: DEBRIDEMENT, FOOT;  Surgeon: Mitch Wall MD;  Location: [facility] OR;  Service: Vascular;;    EXAMINATION UNDER ANESTHESIA Left 12/5/2018    Procedure: Exam under anesthesia, left foot debridement, washout and all other indicated procedures;  Surgeon: Mitch Wall MD;  Location: [facility] OR;  Service: Vascular;  Laterality: Left;  1030AM START  RN PREOP 12/3/2018-----AICD  SEEN BY DR JAMES 12/4    EXAMINATION UNDER ANESTHESIA Left 2/13/2019    Procedure: LEFT FOOT WOUND DEBRIDEMENT, WASHOUT AND ALL OTHER INDICATED PROCEDURES;  Surgeon: Mitch Wall MD;  Location: [facility] OR;  Service: Vascular;  Laterality: Left;  requesting 1st case start  THIS CASE 1ST PER DR WALL ON 2/1/19 @ 844AM -[place]  RN PREOP 2/6/2019  HAS CARDIAC CLEARANCE    EXAMINATION UNDER ANESTHESIA Left 12/28/2018    Procedure: Exam under anesthesia, left foot debridement, left foot washout, possible left second through fifth metatarsal resection;  Surgeon: Mitch Wall MD;  Location: [facility] OR;  Service: Vascular;  Laterality: Left;  1:00pm start per julissa @ 8:58am  RN  PHONE PRE OP 12-27-18--=Pt coming from [facility] [address] [facility]  NEED CONSENT    EXAMINATION UNDER ANESTHESIA Left 6/5/2019    Procedure: LEFT FOOT DEBRIDEMENT, WASHOUT AND ALL OTHER INDICATED PROCEDURES;  Surgeon: Mitch Wall MD;  Location: [facility] OR;  Service: Vascular;  Laterality: Left;  RN PRE OP 5-31-19------ICD------NEED CONSENT    INCISION AND DRAINAGE Left 11/14/2018    Procedure: Incision and Drainage, left lower extremity debridement, washout;  Surgeon: Mitch Wall MD;  Location: [facility] OR;  Service: Vascular;  Laterality: Left;    INCISION AND DRAINAGE Left 11/16/2018    Procedure: INCISION AND DRAINAGE;  Surgeon: Mitch Wall MD;  Location:  [facility] OR;  Service: Vascular;  Laterality: Left;    INTRAOCULAR PROSTHESES INSERTION Right 9/26/2018    Procedure: INSERTION, IOL PROSTHESIS;  Surgeon: Perla Cortés MD;  Location: [address];  Service: Ophthalmology;  Laterality: Right;    PERITONEOCENTESIS N/A 3/1/2019    Procedure: PARACENTESIS, ABDOMINAL, INITIAL;  Surgeon: Dosc Diagnostic Provider;  Location: [facility] OR;  Service: Radiology;  Laterality: N/A;    PERITONEOCENTESIS N/A 3/25/2019    Procedure: PARACENTESIS, ABDOMINAL, INITIAL;  Surgeon: Dosc Diagnostic Provider;  Location: [facility] OR;  Service: Radiology;  Laterality: N/A;    PERITONEOCENTESIS N/A 7/22/2019    Procedure: PARACENTESIS, ABDOMINAL, INITIAL;  Surgeon: Dosc Diagnostic Provider;  Location: [facility] OR;  Service: Radiology;  Laterality: N/A;    PERITONEOCENTESIS N/A 8/16/2019    Procedure: PARACENTESIS, ABDOMINAL, INITIAL;  Surgeon: Dosc Diagnostic Provider;  Location: [facility] OR;  Service: Radiology;  Laterality: N/A;    PERITONEOCENTESIS N/A 9/26/2019    Procedure: PARACENTESIS, ABDOMINAL;  Surgeon: Dosc Diagnostic Provider;  Location: [facility] OR;  Service: Radiology;  Laterality: N/A;    PERITONEOCENTESIS N/A 10/11/2019    Procedure: PARACENTESIS, ABDOMINAL;  Surgeon: Dosc Diagnostic Provider;  Location: [facility] OR;  Service: Radiology;  Laterality: N/A;    PERITONEOCENTESIS N/A 10/31/2019    Procedure: PARACENTESIS, ABDOMINAL;  Surgeon: Dosc Diagnostic Provider;  Location: [facility] OR;  Service: Radiology;  Laterality: N/A;    PERITONEOCENTESIS N/A 11/18/2019    Procedure: PARACENTESIS, ABDOMINAL;  Surgeon: Dosc Diagnostic Provider;  Location: [facility] OR;  Service: Radiology;  Laterality: N/A;    PERITONEOCENTESIS N/A 12/16/2019    Procedure: PARACENTESIS, ABDOMINAL;  Surgeon: Dosc Diagnostic Provider;  Location: [facility] OR;  Service: Radiology;  Laterality: N/A;  2PM START    PERITONEOCENTESIS N/A 2/7/2020    Procedure: PARACENTESIS, ABDOMINAL;  Surgeon: Dosc Diagnostic Provider;   Location: [facility] OR;  Service: Radiology;  Laterality: N/A;  REQUESTED 10:30A START    PERITONEOCENTESIS N/A 2/19/2020    Procedure: PARACENTESIS, ABDOMINAL;  Surgeon: Dosc Diagnostic Provider;  Location: [facility] OR;  Service: Radiology;  Laterality: N/A;    PERITONEOCENTESIS N/A 2/28/2020    Procedure: PARACENTESIS, ABDOMINAL;  Surgeon: Dosc Diagnostic Provider;  Location: [facility] OR;  Service: Radiology;  Laterality: N/A;  REQUESTED 10AM START    PHACOEMULSIFICATION OF CATARACT Right 9/26/2018    Procedure: PHACOEMULSIFICATION, CATARACT;  Surgeon: Perla Cortés MD;  [facility];  Service: Ophthalmology;  Laterality: Right;    REPEAT ABDOMINAL PARACENTESIS N/A 2/11/2019    Procedure: PARACENTESIS, ABDOMINAL, REPEAT;  Surgeon: Dosc Diagnostic Provider;  Location: [facility] OR;  Service: Radiology;  Laterality: N/A;  1PM START    REPEAT ABDOMINAL PARACENTESIS N/A 9/12/2019    Procedure: PARACENTESIS, ABDOMINAL, REPEAT;  Surgeon: Dosc Diagnostic Provider;  Location: [facility] OR;  Service: Radiology;  Laterality: N/A;  9AM START    REPEAT ABDOMINAL PARACENTESIS N/A 12/2/2019    Procedure: PARACENTESIS, ABDOMINAL, REPEAT;  Surgeon: Dosc Diagnostic Provider;  Location: [facility] OR;  Service: Radiology;  Laterality: N/A;  3PM START    REPEAT ABDOMINAL PARACENTESIS N/A 1/10/2020    Procedure: PARACENTESIS, ABDOMINAL, REPEAT;  Surgeon: Dosc Diagnostic Provider;  Location: [facility] OR;  Service: Radiology;  Laterality: N/A;  1130AM START    REPEAT ABDOMINAL PARACENTESIS N/A 1/20/2020    Procedure: PARACENTESIS, ABDOMINAL, REPEAT;  Surgeon: Dosc Diagnostic Provider;  Location: [facility] OR;  Service: Radiology;  Laterality: N/A;  1PM START    REPEAT ABDOMINAL PARACENTESIS N/A 1/31/2020    Procedure: PARACENTESIS, ABDOMINAL, REPEAT;  Surgeon: Dosc Diagnostic Provider;  Location: [facility] OR;  Service: Radiology;  Laterality: N/A;  10AM START    REPEAT ABDOMINAL PARACENTESIS N/A 3/16/2020    Procedure: PARACENTESIS, ABDOMINAL,  REPEAT;  Surgeon: Dosc Diagnostic Provider;  Location: [facility] OR;  Service: Radiology;  Laterality: N/A;  10AM START    REPEAT ABDOMINAL PARACENTESIS N/A 3/30/2020    Procedure: PARACENTESIS, ABDOMINAL, REPEAT;  Surgeon: Dosc Diagnostic Provider;  Location: [facility];  Service: Radiology;  Laterality: N/A;    REPEAT ABDOMINAL PARACENTESIS N/A 4/9/2020    Procedure: PARACENTESIS, ABDOMINAL, REPEAT;  Surgeon: Dosc Diagnostic Provider;  Location: [facility] OR;  Service: Radiology;  Laterality: N/A;  1130AM START    REPEAT ABDOMINAL PARACENTESIS N/A 5/8/2020    Procedure: PARACENTESIS, ABDOMINAL, REPEAT;  Surgeon: Dosc Diagnostic Provider;  Location: [facility] OR;  Service: Radiology;  Laterality: N/A;  9AM START    REPEAT ABDOMINAL PARACENTESIS N/A 5/21/2020    Procedure: PARACENTESIS, ABDOMINAL, REPEAT;  Surgeon: Dosc Diagnostic Provider;  Location: [facility] OR;  Service: Radiology;  Laterality: N/A;  10AM START    REPEAT ABDOMINAL PARACENTESIS N/A 6/5/2020    Procedure: PARACENTESIS, ABDOMINAL, REPEAT;  Surgeon: Dosc Diagnostic Provider;  Location: [facility] OR;  Service: Radiology;  Laterality: N/A;  NOON START    REPEAT ABDOMINAL PARACENTESIS N/A 7/10/2020    Procedure: PARACENTESIS, ABDOMINAL, REPEAT;  Surgeon: Dosc Diagnostic Provider;  Location: [facility] OR;  Service: Radiology;  Laterality: N/A;  1PM START    REPEAT ABDOMINAL PARACENTESIS N/A 8/20/2020    Procedure: PARACENTESIS, ABDOMINAL, REPEAT;  Surgeon: Dosc Diagnostic Provider;  Location: [facility] OR;  Service: Radiology;  Laterality: N/A;  1PM START    REPEAT ABDOMINAL PARACENTESIS N/A 9/4/2020    Procedure: PARACENTESIS, ABDOMINAL, REPEAT;  Surgeon: Dosc Diagnostic Provider;  [facility]: [facility];  Service: Radiology;  Laterality: N/A;  11 AM START    REPEAT ABDOMINAL PARACENTESIS N/A 9/16/2020    Procedure: PARACENTESIS, ABDOMINAL, REPEAT;  Surgeon: Dosc Diagnostic Provider;  Location: [facility] OR;  Service: Radiology;  Laterality: N/A;  START 2:00 P.M.    REVISION OF PROCEDURE  INVOLVING GLAUCOMA DRAINAGE DEVICE Left 10/2/2019    EXTRUDING BAERVELDT /WITH PERICARDIAL PATCH GRAFT ()    Right foot surgery  10/2014    TOE AMPUTATION Right     first and second    TOE AMPUTATION Left 11/16/2018    Procedure: AMPUTATION, TOES  2-5;  Surgeon: Mitch Chan MD;  Location: [facility] OR;  Service: Vascular;  Laterality: Left;    TOE AMPUTATION Left 12/5/2018    Procedure: AMPUTATION, TOE;  Surgeon: Mitch Chan MD;  Location: [facility] OR;  Service: Vascular;  Laterality: Left;  left great toe    TONSILLECTOMY       Home Meds:   Prior to Admission medications    Medication Sig Start Date End Date Taking? Authorizing Provider   acetaminophen-codeine 300-30mg (TYLENOL #3) 300-30 mg Tab Take 1 tablet by mouth every 6 (six) hours as needed. 9/18/20 9/28/20  Rubén Davis MD   allopurinoL (ZYLOPRIM) 300 MG tablet TAKE 1 TABLET(300 MG) BY MOUTH EVERY DAY 6/15/20   Rubén Davis MD   aspirin (ECOTRIN) 81 MG EC tablet Take 81 mg by mouth once daily.    Historical Provider   bisacodyl (DULCOLAX) 5 mg EC tablet Take 5 mg by mouth daily as needed for Constipation.    Historical Provider   brimonidine 0.2% (ALPHAGAN) 0.2 % Drop Place 1 drop into both eyes 3 (three) times daily. 12/12/19   Perla Cortés MD   carvediloL (COREG) 3.125 MG tablet TAKE 1 TABLET(3.125 MG) BY MOUTH TWICE DAILY 8/17/20   Rubén Davis MD   cephALEXin (KEFLEX) 500 MG capsule Take 1 capsule (500 mg total) by mouth 4 (four) times daily. for 14 days 9/22/20 10/6/20  Dixon West MD   ciprofloxacin HCl (CIPRO) 750 MG tablet Take 1 tablet (750 mg total) by mouth every 12 (twelve) hours. for 14 days 9/22/20 10/6/20  Dixon West MD   coenzyme Q10 100 mg capsule Take 100 mg by mouth every morning.    Historical Provider   dorzolamide-timolol 2-0.5% (COSOPT) 22.3-6.8 mg/mL ophthalmic solution Place 1 drop into both eyes 3 (three) times daily. 12/12/19   Perla Cortés MD   ezetimibe (ZETIA) 10  "mg tablet TAKE 1 TABLET(10 MG) BY MOUTH EVERY DAY 7/16/20   Rubén Davis MD   fish oil-omega-3 fatty acids 300-1,000 mg capsule Take 1 capsule by mouth 2 (two) times daily. 1/23/19   Sara Ching MD   gabapentin (NEURONTIN) 100 MG capsule TAKE 2 CAPSULES(200 MG) BY MOUTH EVERY EVENING 9/21/20   Rubén Davis MD   insulin (LANTUS SOLOSTAR U-100 INSULIN) glargine 100 units/mL (3mL) SubQ pen Inject 35 units once daily 9/11/20   Sheryl Madison NP   liraglutide 0.6 mg/0.1 mL, 18 mg/3 mL, subq PNIJ (VICTOZA 3-MEMO) 0.6 mg/0.1 mL (18 mg/3 mL) PnIj pen Inject 1.8 mg into the skin once daily. 9/11/20 9/11/21  Sheryl Madison NP   MULTIVIT,THER IRON,CA,FA & MIN (MULTIVITAMIN) Tab Take 1 tablet by mouth every morning.     Historical Provider   pen needle, diabetic (BD ULTRA-FINE AMADOR PEN NEEDLE) 32 gauge x 5/32" Ndle 1 Device by Misc.(Non-Drug; Combo Route) route 2 (two) times a day. 9/11/20   Sheryl Madison NP   torsemide (DEMADEX) 20 MG Tab Take 4 tablets (80 mg total) by mouth 2 (two) times daily. 6/16/20 9/11/20  Rubén Davis MD     Anticoagulants/Antiplatelets: no anticoagulation    Allergies:   Review of patient's allergies indicates:   Allergen Reactions    Statins-hmg-coa reductase inhibitors      Generalized Pain    Onglyza [saxagliptin]     Penicillins Rash     Sedation History:  no adverse reactions     Review of Systems:   Hematological: no known coagulopathies  Respiratory: positive for - orthopnea and shortness of breath  Cardiovascular: positive for - dyspnea on exertion, orthopnea and shortness of breath  Gastrointestinal: positive for - abdominal pain and distension  Genito-Urinary: no dysuria, trouble voiding, or hematuria  Musculoskeletal: negative  Neurological: no TIA or stroke symptoms      OBJECTIVE:     Vital Signs (Most Recent)    Physical Exam:  General: no acute distress  Mental Status: alert and oriented to person, place and time  HEENT: normocephalic, atraumatic  Chest: mild labored " breathing  Heart: regular heart rate  Abdomen: +distended. No TTP/r/g.  Extremity: moves all extremities    Laboratory  Lab Results   Component Value Date    INR 1.1 05/31/2019       Lab Results   Component Value Date    WBC 10.60 08/25/2020    HGB 11.8 (L) 08/25/2020    HCT 37.4 (L) 08/25/2020    MCV 93 08/25/2020     08/25/2020      Lab Results   Component Value Date     (H) 08/25/2020     08/25/2020    K 4.2 08/25/2020     08/25/2020    CO2 26 08/25/2020    BUN 44 (H) 08/25/2020    CREATININE 1.3 08/25/2020    CALCIUM 8.3 (L) 08/25/2020    MG 2.1 05/31/2019    ALT 30 08/25/2020    AST 21 08/25/2020    ALBUMIN 2.4 (L) 08/25/2020    BILITOT 0.7 08/25/2020     ASSESSMENT/PLAN:     59 y/o M with CKD III and combined systolic/diastolic CHF complicated by recurrent abdominal distension and pain 2/2 recurrent painful, tense ascites without TTP on PE to suggest SBP, requiring frequent large-volume therapeutic paracentesis.      1. Will attempt U/S-guided therapeutic paracentesis with local anesthetic only and albumin infusion post PRN per institutional protocol.      Risks (including, but not limited to, pain, bleeding, infection, damage to nearby structures, failure to obtain sufficient material for a diagnosis, the need for additional procedures, and death), benefits, and alternatives were discussed with the patient. All questions were answered to the best of my abilities. The patient wishes to proceed with the procedure. Written informed consent was obtained.     Thank you for considering IR for the care of your patient.      Zach Cha MD  Interventional Radiology

## 2020-09-28 NOTE — BRIEF OP NOTE
Radiology Post-Procedure Note     Pre Op Diagnosis: Recurrent painful, tense ascites  Post Op Diagnosis: Same     Procedure: U/S-guided therapeutic LVP     Procedure performed by: Zach Cha MD     Written Informed Consent Obtained: Yes  Specimen Removed: YES, 5700-cc of thin, straw-colored ascitic fluid  Estimated Blood Loss: none     Findings:   1. Successful therapeutic large-volume paracentesis with local anesthetic only and albumin infusion post PRN as indicated per institutional protocol. Patient tolerated the procedure well. No immediate post-procedural complications noted.       Thank you for considering IR for the care of your patient.      Zach Cha MD  Interventional Radiology

## 2020-09-28 NOTE — DISCHARGE SUMMARY
Radiology Discharge Summary      Hospital Course: No complications    Admit Date: 9/28/2020  Discharge Date: 09/28/2020     Instructions Given to Patient: Yes    Diet: Resume prior diet     Activity: activity as tolerated    Description of Condition on Discharge: Stable    Vital Signs (Most Recent): BP: 112/64 (09/28/20 1401)    Discharge Disposition: Home    Discharge Diagnosis:   60 y/o M with h/o recurrent painful, tense ascites s/p successful U/S-guided therapeutic LVP with local anesthetic only and albumin infusion post PRN as indicated per institutional protocol. Patient tolerated the procedure well. No immediate post-procedural complications noted.      Thank you for considering IR for the care of your patient.      Zach Cha MD  Interventional Radiology

## 2020-09-29 ENCOUNTER — TELEPHONE (OUTPATIENT)
Dept: INFECTIOUS DISEASES | Facility: CLINIC | Age: 60
End: 2020-09-29

## 2020-09-29 ENCOUNTER — HOSPITAL ENCOUNTER (OUTPATIENT)
Dept: WOUND CARE | Facility: HOSPITAL | Age: 60
Discharge: HOME | End: 2020-09-29
Attending: FAMILY MEDICINE
Payer: MEDICAID

## 2020-09-29 VITALS — HEART RATE: 79 BPM | DIASTOLIC BLOOD PRESSURE: 57 MMHG | TEMPERATURE: 98 F | SYSTOLIC BLOOD PRESSURE: 116 MMHG

## 2020-09-29 DIAGNOSIS — N17.9 ACUTE KIDNEY INJURY: Primary | ICD-10-CM

## 2020-09-29 DIAGNOSIS — E11.621 TYPE 2 DIABETES MELLITUS WITH LEFT DIABETIC FOOT ULCER: Primary | ICD-10-CM

## 2020-09-29 DIAGNOSIS — L97.529 TYPE 2 DIABETES MELLITUS WITH LEFT DIABETIC FOOT ULCER: Primary | ICD-10-CM

## 2020-09-29 DIAGNOSIS — E08.621 DIABETES MELLITUS DUE TO UNDERLYING CONDITION WITH FOOT ULCER, WITHOUT LONG-TERM CURRENT USE OF INSULIN: ICD-10-CM

## 2020-09-29 DIAGNOSIS — L97.516 DIABETIC ULCER OF TOE OF RIGHT FOOT ASSOCIATED WITH TYPE 2 DIABETES MELLITUS, WITH BONE INVOLVEMENT WITHOUT EVIDENCE OF NECROSIS: ICD-10-CM

## 2020-09-29 DIAGNOSIS — E11.621 DIABETIC ULCER OF TOE OF RIGHT FOOT ASSOCIATED WITH TYPE 2 DIABETES MELLITUS, WITH BONE INVOLVEMENT WITHOUT EVIDENCE OF NECROSIS: ICD-10-CM

## 2020-09-29 DIAGNOSIS — L97.509 DIABETES MELLITUS DUE TO UNDERLYING CONDITION WITH FOOT ULCER, WITHOUT LONG-TERM CURRENT USE OF INSULIN: ICD-10-CM

## 2020-09-29 DIAGNOSIS — L97.519 TYPE 2 DIABETES MELLITUS WITH RIGHT DIABETIC FOOT ULCER: ICD-10-CM

## 2020-09-29 DIAGNOSIS — E11.621 TYPE 2 DIABETES MELLITUS WITH RIGHT DIABETIC FOOT ULCER: ICD-10-CM

## 2020-09-29 PROCEDURE — 99214 OFFICE O/P EST MOD 30 MIN: CPT | Performed by: FAMILY MEDICINE

## 2020-09-29 PROCEDURE — 99214 OFFICE O/P EST MOD 30 MIN: CPT

## 2020-09-29 PROCEDURE — 99214 PR OFFICE/OUTPT VISIT, EST, LEVL IV, 30-39 MIN: ICD-10-PCS | Mod: ,,, | Performed by: FAMILY MEDICINE

## 2020-09-29 PROCEDURE — 99214 OFFICE O/P EST MOD 30 MIN: CPT | Mod: ,,, | Performed by: FAMILY MEDICINE

## 2020-09-29 NOTE — TELEPHONE ENCOUNTER
----- Message from Dixon West MD sent at 9/29/2020  3:44 PM CDT -----  His kidneys are not functioning at 100%.  We will monitor it and check labs again in 2 weeks.  Please notify him.

## 2020-09-29 NOTE — TELEPHONE ENCOUNTER
Spoke with pt and informed him of his lab results. I scheduled his lab appointment for 2 weeks from today

## 2020-09-29 NOTE — PROGRESS NOTES
"  9/29/20 - extending to plantar foot and up further medial foot (started new wound cluster). Area cool to touch. Intermittent stabbing pain described.     9/29/20 - new breakdown to plantar aspect of foot/heel area. Moisture issues with constant draining. Cool to touch.     9/22/20 - Right medial heel/foot - note breakdown spreading and darkening of tissues.     9/15/20: Right Medial Heel/Foot - breakdown to back of heel from moisture with avascular tissue formation to formerly granular area around heel.     9/8/20 - Right Medial Foot with sloughing of wet skin from last week with partial thickness wound to Medial Heel connecting to Medial Foot. Appears improved overall. Drainage controlled.     9/1/20- Right Medial Foot with Medial Heel breakdown and surrounding macerated skin. Patient states from darco strap being "too tight".     [facility]                Progress Note    Subjective:       Patient ID: Marvin Ray is a 60 y.o. male.    Chief Complaint: No chief complaint on file.    Patient transferred to exam bed with stand by assistance. 6/10 pain to Right foot described as intermittent "stabbing". Patient reports staying in bed almost all day to elevate right foot on 2 pillows. Sister reports that she noticed drainage has gotten worse since patient has been doing less. Patient also has been having regular paracentesis to drain fluid from abdomen. Patient expresses feeling more despondent with less activity. Sister concurs that she watches patient try and do everything as instructed without much improvement in condition. Encouraged a balanced life with activities patient enjoys (like gardening and sitting on the porch) interspersed with elevating of BLE to decrease drainage following activities.     Spoke with Vascular Surgeon MD Chan in person today regarding concern for patient's Right Foot worsening. Patient has appointment with MD Chan tomorrow " morning that was already scheduled. Right Heel/Medial Foot feels cool to touch and cold to touch around 5th toe where patient has some minor excoriation from moisture secondary to increased drainage from Right Heel/Medial Foot wound. Right Dorsal Foot/Ankle/Leg warm to touch. Difficult to palpate Right pedal pulse. Right Heel/Medial Foot measuring larger this week with maceration and sloughing of skin from macerated areas. Started new wound cluster to Right Medial Foot. Included linear timeline of Right Medial Heel/Foot wound to show degradation of quality of wound bed.     Right Medial Malleolus with improved granulation within wound bed this week. Using iodosorb/mextra.     Right 3rd Toe Ulcer probed to bone last week. Area now a pinpoint and cannot be probed. Continue Aquacel AG/foam to protect.    Left Distal TMA smaller measurements this week. Left Medial Foot measuring slightly bigger this week with some periwound maceration despite gentian violet use. Will continue Adia collagen to Left foot wounds.     Therapy adjustments:   =Started painting Right Heel/Foot ulceration with betadine. Focus on moisture management with betadine and continuing to use high absorbency mextra over area, but with daily changes      that include cast padding x 2, stockinet changed per sister.   =Calmoseptine to Left Foot periwound      Review of Systems   Constitutional: Negative.    HENT: Negative.    Eyes: Negative.    Respiratory: Negative.    Cardiovascular: Positive for leg swelling.   Gastrointestinal: Positive for abdominal distention. Negative for abdominal pain, blood in stool, diarrhea, nausea and rectal pain.   Genitourinary: Negative.    Musculoskeletal: Negative.    Skin: Positive for wound.   Psychiatric/Behavioral: Negative.          Objective:        Physical Exam  Vitals signs reviewed.   Constitutional:       Appearance: Normal appearance.   HENT:      Head: Normocephalic and atraumatic.      Right Ear: External  ear normal.      Left Ear: External ear normal.      Nose: Nose normal.      Mouth/Throat:      Mouth: Mucous membranes are moist.      Pharynx: Oropharynx is clear.   Eyes:      Extraocular Movements: Extraocular movements intact.      Pupils: Pupils are equal, round, and reactive to light.   Neck:      Musculoskeletal: Normal range of motion and neck supple.   Cardiovascular:      Rate and Rhythm: Normal rate and regular rhythm.      Pulses: Normal pulses.      Heart sounds: Normal heart sounds.   Pulmonary:      Effort: Pulmonary effort is normal. No respiratory distress.      Breath sounds: No wheezing.   Abdominal:      General: There is no distension.      Palpations: Abdomen is soft.      Tenderness: There is no abdominal tenderness.   Musculoskeletal:      Right lower leg: Edema present.      Left lower leg: Edema present.   Skin:     General: Skin is warm and dry.      Comments: +multiple bialteral DFU's with excess slough; right foot macerated with new skin breakdown; bilateral feet cool to touch   Neurological:      Mental Status: He is alert and oriented to person, place, and time. Mental status is at baseline.   Psychiatric:         Mood and Affect: Mood normal.         Behavior: Behavior normal.         Vitals:    09/29/20 0940   BP: (!) 116/57   Pulse: 79   Temp: 97.7 °F (36.5 °C)       Assessment:           ICD-10-CM ICD-9-CM   1. Type 2 diabetes mellitus with left diabetic foot ulcer  E11.621 250.80    L97.529 707.15   2. Diabetic ulcer of toe of right foot associated with type 2 diabetes mellitus, with bone involvement without evidence of necrosis  E11.621 250.80    L97.516 707.15   3. Diabetes mellitus due to underlying condition with foot ulcer, without long-term current use of insulin  E08.621 249.80    L97.509 707.15            Wound 11/02/18 Diabetic Ulcer Left medial Foot (Active)   11/02/18     Pre-existing: Yes   Primary Wound Type: Diabetic ulc   Side: Left   Orientation: medial    Location: Foot   Wound Number (optional):    Ankle-Brachial Index:    Pulses:    Removal Indication and Assessment:    Wound Outcome:    (Retired) Wound Type:    (Retired) Wound Length (cm):    (Retired) Wound Width (cm):    (Retired) Depth (cm):    Wound Description (Comments):    Removal Indications:    Wound Image   09/29/20 1200   Wound WDL ex 09/29/20 1200   Dressing Appearance Moist drainage 09/29/20 1200   Drainage Amount Moderate 09/29/20 1200   Drainage Characteristics/Odor Serosanguineous 09/29/20 1200   Appearance Pink;Red 09/29/20 1200   Tissue loss description Full thickness 09/29/20 1200   Black (%), Wound Tissue Color 0 % 09/29/20 1200   Red (%), Wound Tissue Color 100 % 09/29/20 1200   Yellow (%), Wound Tissue Color 0 % 09/29/20 1200   Periwound Area Scar tissue 09/29/20 1200   Wound Edges Open 09/29/20 1200   Wound Length (cm) 8.5 cm 09/29/20 1200   Wound Width (cm) 6.5 cm 09/29/20 1200   Wound Depth (cm) 0.2 cm 09/29/20 1200   Wound Volume (cm^3) 11.05 cm^3 09/29/20 1200   Wound Surface Area (cm^2) 55.25 cm^2 09/29/20 1200   Care Cleansed with:;Soap and water;Sterile normal saline 09/29/20 1200   Dressing Changed;Silver;Collagen;Abd pad;Cast padding;Tubular bandage 09/29/20 1200   Off Loading Football dressing 09/29/20 1200            Wound 05/08/20 1015 Diabetic Ulcer Left distal Foot (Active)   05/08/20 1015    Pre-existing: Yes   Primary Wound Type: Diabetic ulc   Side: Left   Orientation: distal   Location: Foot   Wound Number (optional):    Ankle-Brachial Index:    Pulses:    Removal Indication and Assessment:    Wound Outcome:    (Retired) Wound Type:    (Retired) Wound Length (cm):    (Retired) Wound Width (cm):    (Retired) Depth (cm):    Wound Description (Comments):    Removal Indications:    Wound Image   09/29/20 1200   Wound WDL ex 09/29/20 1200   Dressing Appearance Moist drainage 09/29/20 1200   Drainage Amount Moderate 09/29/20 1200   Drainage Characteristics/Odor Serosanguineous  09/29/20 1200   Appearance Pink;Red;Yellow;Slough 09/29/20 1200   Tissue loss description Full thickness 09/29/20 1200   Black (%), Wound Tissue Color 0 % 09/29/20 1200   Red (%), Wound Tissue Color 90 % 09/29/20 1200   Yellow (%), Wound Tissue Color 10 % 09/29/20 1200   Periwound Area Scar tissue 09/29/20 1200   Wound Edges Open 09/29/20 1200   Wound Length (cm) 3 cm 09/29/20 1200   Wound Width (cm) 8 cm 09/29/20 1200   Wound Depth (cm) 0.2 cm 09/29/20 1200   Wound Volume (cm^3) 4.8 cm^3 09/29/20 1200   Wound Surface Area (cm^2) 24 cm^2 09/29/20 1200   Care Cleansed with:;Soap and water;Sterile normal saline 09/29/20 1200   Dressing Changed;Silver;Collagen;Abd pad;Cast padding;Tubular bandage 09/29/20 1200   Off Loading Football dressing 09/29/20 1200            Wound 08/11/20 1051 Diabetic Ulcer Right medial Malleolus/Ankle (Active)   08/11/20 1051    Pre-existing: No   Primary Wound Type: Diabetic ulc   Side: Right   Orientation: medial   Location: Malleolus/Ankle   Wound Number (optional):    Ankle-Brachial Index:    Pulses:    Removal Indication and Assessment:    Wound Outcome:    (Retired) Wound Type:    (Retired) Wound Length (cm):    (Retired) Wound Width (cm):    (Retired) Depth (cm):    Wound Description (Comments):    Removal Indications:    Wound Image   09/29/20 1200   Wound WDL ex 09/29/20 1200   Dressing Appearance Moist drainage 09/29/20 1200   Drainage Amount Moderate 09/29/20 1200   Drainage Characteristics/Odor Serosanguineous 09/29/20 1200   Appearance Pink;Yellow;Slough 09/29/20 1200   Tissue loss description Full thickness 09/29/20 1200   Black (%), Wound Tissue Color 0 % 09/29/20 1200   Red (%), Wound Tissue Color 90 % 09/29/20 1200   Yellow (%), Wound Tissue Color 10 % 09/29/20 1200   Periwound Area Intact 09/29/20 1200   Wound Edges Open 09/29/20 1200   Wound Length (cm) 2 cm 09/29/20 1200   Wound Width (cm) 2.5 cm 09/29/20 1200   Wound Depth (cm) 0.2 cm 09/29/20 1200   Wound Volume (cm^3)  1 cm^3 09/29/20 1200   Wound Surface Area (cm^2) 5 cm^2 09/29/20 1200   Care Cleansed with:;Soap and water;Antimicrobial agent 09/29/20 1200   Dressing Changed;Other (see comments);Compression wrap;Tubular bandage 09/29/20 1200   Off Loading Football dressing 09/29/20 1200            Wound 08/11/20 1052 Diabetic Ulcer Right medial Heel (Active)   08/11/20 1052    Pre-existing: No   Primary Wound Type: Diabetic ulc   Side: Right   Orientation: medial   Location: Heel   Wound Number (optional):    Ankle-Brachial Index:    Pulses:    Removal Indication and Assessment:    Wound Outcome:    (Retired) Wound Type:    (Retired) Wound Length (cm):    (Retired) Wound Width (cm):    (Retired) Depth (cm):    Wound Description (Comments):    Removal Indications:    Wound Image    09/29/20 1200   Wound WDL ex 09/29/20 1200   Dressing Appearance Moist drainage;Saturated 09/29/20 1200   Drainage Amount Large 09/29/20 1200   Drainage Characteristics/Odor Serosanguineous 09/29/20 1200   Appearance Yellow;Slough;Black;Eschar;Gray 09/29/20 1200   Tissue loss description Full thickness 09/29/20 1200   Black (%), Wound Tissue Color 50 % 09/29/20 1200   Red (%), Wound Tissue Color 0 % 09/29/20 1200   Yellow (%), Wound Tissue Color 50 % 09/29/20 1200   Periwound Area Denuded;Macerated 09/29/20 1200   Wound Edges Approximated 09/29/20 1200   Wound Length (cm) 12 cm 09/29/20 1200   Wound Width (cm) 10.5 cm 09/29/20 1200   Wound Depth (cm) 1.4 cm 09/29/20 1200   Wound Volume (cm^3) 176.4 cm^3 09/29/20 1200   Wound Surface Area (cm^2) 126 cm^2 09/29/20 1200   Care Cleansed with:;Soap and water;Antimicrobial agent;Povidone iodine 09/29/20 1200   Dressing Changed;Other (see comments);Cast padding;Tubular bandage 09/29/20 1200   Periwound Care Moisture barrier applied 09/29/20 1200   Off Loading Football dressing 09/29/20 1200            Wound 09/15/20 1038 Blister(s) Right medial Leg (Active)   09/15/20 1038    Pre-existing: No   Primary Wound  Type: Blister(s)   Side: Right   Orientation: medial   Location: Leg   Wound Number (optional):    Ankle-Brachial Index:    Pulses:    Removal Indication and Assessment:    Wound Outcome:    (Retired) Wound Type:    (Retired) Wound Length (cm):    (Retired) Wound Width (cm):    (Retired) Depth (cm):    Wound Description (Comments):    Removal Indications:    Wound Image   09/29/20 1200   Wound WDL WDL 09/29/20 1200   Dressing Appearance Dry;Intact;Clean 09/29/20 1200   Drainage Amount Scant 09/29/20 1200   Drainage Characteristics/Odor Serosanguineous 09/29/20 1200   Appearance Pink;Epithelialization 09/29/20 1200   Tissue loss description Not applicable 09/29/20 1200   Wound Edges Rolled/closed 09/29/20 1200   Wound Length (cm) 0 cm 09/29/20 1200   Wound Width (cm) 0 cm 09/29/20 1200   Wound Depth (cm) 0 cm 09/29/20 1200   Wound Volume (cm^3) 0 cm^3 09/29/20 1200   Wound Surface Area (cm^2) 0 cm^2 09/29/20 1200   Care Cleansed with:;Soap and water 09/29/20 1200   Dressing Changed;Foam 09/29/20 1200            Wound 09/29/20 1045 Diabetic Ulcer Right medial Foot #2 (Active)   09/29/20 1045    Pre-existing:    Primary Wound Type: Diabetic ulc   Side: Right   Orientation: medial   Location: Foot   Wound Number (optional): #2   Ankle-Brachial Index:    Pulses:    Removal Indication and Assessment:    Wound Outcome:    (Retired) Wound Type:    (Retired) Wound Length (cm):    (Retired) Wound Width (cm):    (Retired) Depth (cm):    Wound Description (Comments):    Removal Indications:    Wound Image   09/29/20 1200   Wound WDL ex 09/29/20 1200   Dressing Appearance Moist drainage 09/29/20 1200   Drainage Amount Moderate 09/29/20 1200   Drainage Characteristics/Odor Serosanguineous 09/29/20 1200   Appearance Pink;Yellow;Slough 09/29/20 1200   Tissue loss description Partial thickness 09/29/20 1200   Black (%), Wound Tissue Color 0 % 09/29/20 1200   Red (%), Wound Tissue Color 0 % 09/29/20 1200   Yellow (%), Wound Tissue  Color 50 % 09/29/20 1200   Periwound Area Edematous;Denuded 09/29/20 1200   Wound Edges Open 09/29/20 1200   Wound Length (cm) 5 cm 09/29/20 1200   Wound Width (cm) 1.5 cm 09/29/20 1200   Wound Depth (cm) 0.1 cm 09/29/20 1200   Wound Volume (cm^3) 0.75 cm^3 09/29/20 1200   Wound Surface Area (cm^2) 7.5 cm^2 09/29/20 1200   Care Cleansed with:;Antimicrobial agent;Soap and water 09/29/20 1200   Dressing Changed;Collagen;Silver;Other (see comments);Cast padding;Tubular bandage 09/29/20 1200   Off Loading Football dressing 09/29/20 1200            Wound 09/29/20 1045 Other (comment) Right medial Toe, fifth (Active)   09/29/20 1045    Pre-existing:    Primary Wound Type: Other   Side: Right   Orientation: medial   Location: Toe, fifth   Wound Number (optional):    Ankle-Brachial Index:    Pulses:    Removal Indication and Assessment:    Wound Outcome:    (Retired) Wound Type:    (Retired) Wound Length (cm):    (Retired) Wound Width (cm):    (Retired) Depth (cm):    Wound Description (Comments):    Removal Indications:    Wound Image   09/29/20 1200   Wound WDL ex 09/29/20 1200   Dressing Appearance Dried drainage 09/29/20 1200   Drainage Amount Small 09/29/20 1200   Drainage Characteristics/Odor Serosanguineous 09/29/20 1200   Appearance Pink;Red 09/29/20 1200   Tissue loss description Partial thickness 09/29/20 1200   Black (%), Wound Tissue Color 0 % 09/29/20 1200   Red (%), Wound Tissue Color 100 % 09/29/20 1200   Yellow (%), Wound Tissue Color 0 % 09/29/20 1200   Periwound Area Macerated;Excoriated 09/29/20 1200   Wound Edges Approximated 09/29/20 1200   Wound Length (cm) 3 cm 09/29/20 1200   Wound Width (cm) 2.5 cm 09/29/20 1200   Wound Depth (cm) 0.1 cm 09/29/20 1200   Wound Volume (cm^3) 0.75 cm^3 09/29/20 1200   Wound Surface Area (cm^2) 7.5 cm^2 09/29/20 1200   Care Cleansed with:;Soap and water;Antimicrobial agent;Applied:;Povidone iodine 09/29/20 1200   Dressing Removed 09/29/20 1200            Incision/Site  06/16/20 0930 Right Toe, third anterior (Active)   06/16/20 0930   Present Prior to Hospital Arrival?: Yes   Side: Right   Location: Toe, third   Orientation: anterior   Incision Type:    Closure Method:    Additional Comments:    Removal Indication and Assessment:    Wound Outcome:    Removal Indications:    Wound Image   09/29/20 1200   Incision WDL WDL 09/29/20 1200   Dressing Appearance Dry;Intact 09/29/20 1200   Drainage Amount Scant 09/29/20 1200   Drainage Characteristics/Odor Serosanguineous 09/29/20 1200   Appearance Fibrin;Yellow 09/29/20 1200   Periwound Area Intact;Swelling 09/29/20 1200   Wound Edges Defined 09/29/20 1200   Wound Length (cm) 0.2 cm 09/29/20 1200   Wound Width (cm) 0.2 cm 09/29/20 1200   Wound Depth (cm) 0.15 cm 09/29/20 1200   Wound Volume (cm^3) 0.01 cm^3 09/29/20 1200   Wound Surface Area (cm^2) 0.04 cm^2 09/29/20 1200   Care Cleansed with:;Soap and water;Antimicrobial agent 09/29/20 1200   Dressing Changed;Calcium alginate;Silver;Foam 09/29/20 1200   Periwound Care Moisture barrier applied 09/29/20 1200           Plan:                Orders Placed This Encounter   Procedures    Change dressing     Clean with saline. Apply calmoseptine periwound. Adia to wound bed. Cover with ABD, wrap with cast padding x 2, secure with stockinet. Change only as needed, if drainage strike-through.    Change dressing     Clean with saline. Paint with betadine to Right medial foot/heel and 5th toe. Pack opening to Right Midfoot and Right Malleolus with iodosorb. Cover medial foot/heel with mextra large and medial malleolus with mextra small , secure with cast padding x 2, stockinet. Change daily.    Change dressing     Clean with saline. Apply aquacel AG alginate. Cover with adhesive foam strip. Change only as needed if no longer clean,dry, and intact.        Follow up in about 1 week (around 10/6/2020) for wound care.     Rubén Davis MD

## 2020-09-30 ENCOUNTER — OFFICE VISIT (OUTPATIENT)
Dept: VASCULAR SURGERY | Facility: CLINIC | Age: 60
End: 2020-09-30
Payer: MEDICAID

## 2020-09-30 VITALS — BODY MASS INDEX: 29.32 KG/M2 | SYSTOLIC BLOOD PRESSURE: 120 MMHG | DIASTOLIC BLOOD PRESSURE: 70 MMHG | HEIGHT: 70 IN

## 2020-09-30 DIAGNOSIS — E11.621 DIABETIC ULCER OF TOE OF RIGHT FOOT ASSOCIATED WITH TYPE 2 DIABETES MELLITUS, WITH BONE INVOLVEMENT WITHOUT EVIDENCE OF NECROSIS: ICD-10-CM

## 2020-09-30 DIAGNOSIS — I70.233 ATHEROSCLEROSIS OF NATIVE ARTERY OF RIGHT LOWER EXTREMITY WITH ULCERATION OF ANKLE: ICD-10-CM

## 2020-09-30 DIAGNOSIS — I70.239 ATHEROSCLEROSIS OF RIGHT LOWER EXTREMITY WITH ULCERATION: ICD-10-CM

## 2020-09-30 DIAGNOSIS — I70.434: ICD-10-CM

## 2020-09-30 DIAGNOSIS — L97.513 SKIN ULCER OF RIGHT FOOT WITH NECROSIS OF MUSCLE: Primary | ICD-10-CM

## 2020-09-30 DIAGNOSIS — L97.516 DIABETIC ULCER OF TOE OF RIGHT FOOT ASSOCIATED WITH TYPE 2 DIABETES MELLITUS, WITH BONE INVOLVEMENT WITHOUT EVIDENCE OF NECROSIS: ICD-10-CM

## 2020-09-30 PROCEDURE — 99999 PR PBB SHADOW E&M-EST. PATIENT-LVL V: ICD-10-PCS | Mod: PBBFAC,,, | Performed by: SURGERY

## 2020-09-30 PROCEDURE — 99999 PR PBB SHADOW E&M-EST. PATIENT-LVL V: CPT | Mod: PBBFAC,,, | Performed by: SURGERY

## 2020-09-30 PROCEDURE — 99215 OFFICE O/P EST HI 40 MIN: CPT | Mod: PBBFAC | Performed by: SURGERY

## 2020-09-30 PROCEDURE — 99215 PR OFFICE/OUTPT VISIT, EST, LEVL V, 40-54 MIN: ICD-10-PCS | Mod: 57,S$PBB,, | Performed by: SURGERY

## 2020-09-30 PROCEDURE — 99215 OFFICE O/P EST HI 40 MIN: CPT | Mod: 57,S$PBB,, | Performed by: SURGERY

## 2020-09-30 RX ORDER — SODIUM CHLORIDE 9 MG/ML
INJECTION, SOLUTION INTRAVENOUS CONTINUOUS
Status: CANCELLED | OUTPATIENT
Start: 2020-09-30

## 2020-09-30 NOTE — PATIENT INSTRUCTIONS
Peripheral Artery Disease (PAD)     Peripheral artery disease (PAD) occurs when the arteries that carry blood to the limbs are narrowed or blocked. This is usually due to a buildup of a fatty substance called plaque in the walls of the arteries.  PAD most often affects the arteries in the legs. When these arteries are narrowed or blocked, blood flow to the legs is reduced. This can cause leg and foot pain and other symptoms. If severe enough, reduced blood flow to the legs can lead to tissue death (gangrene) and the loss of a toe, foot, or leg. Having PAD also makes it more likely that arteries in other body areas are blocked. For instance, arteries that carry blood to the heart or brain may be affected. This raises the chances of heart attack and stroke.  Risk factors  Certain factors can make PAD more likely. They include:  · Smoking  · Diabetes  · High blood pressure  · Unhealthy cholesterol levels  · Obesity  · Inactive lifestyle  · Older age  · Family history of PAD  Symptoms  Many people with PAD have no symptoms. If symptoms do occur, they can include:  · Pain in the muscles of the calves, thighs or hips that gets worse with activity and better with rest (intermittent claudication)  · Achy, tired, or heavy feeling in the legs  · Weakness, numbness, tingling, or loss of feeling in the legs  · Changes in skin color of the legs  · Sores on the legs and feet  · Cold leg, feet, or toes  · Pain the feet or toes even when lying down (rest pain)  Home care  PAD is a chronic (lifelong) condition. Treatment is focused on managing your condition and lowering your health risks. This may include doing the following:  · If you smoke, quit. This helps prevent further damage to your arteries and lowers your health risks. Ask your provider about medicines or products that can help you quit smoking. Also consider joining a stop-smoking program or support group.  · Be more active. This helps you lose weight and manage  problems such as high blood pressure and unhealthy cholesterol levels. Start a walking program if advised to by your provider. Your provider may also help you form a safe exercise program that is right for your needs.  · Make healthy eating changes. This includes eating less fat, salt, and sugar.  · Take medicines for high blood pressure, unhealthy cholesterol levels, and diabetes as directed.  · Have your blood pressure and cholesterol levels checked as often as directed.  · If you have diabetes, try to keep your blood sugar well controlled. Test your blood sugar as directed.  · If you are overweight, talk to your provider about forming a weight-loss plan.  · Watch for cuts, scrapes, or open sores on your feet. Poor blood flow to the feet may slow healing and increase the risk of infection from these problems.   Follow-up care  Follow up with your healthcare provider, or as advised. If imaging tests such as ultrasound were done, they will be reviewed by a doctor. You will be told the results and any new findings that may affect your care.  When to seek medical advice   Call your healthcare provider right away if any of these occur:  · Sudden severe pain in the legs or feet  · Sudden cold, pale or blue color in the legs or feet  · Weakness or numbness in the legs or feet that worsens  · Any sore or wound in the legs or feet that wont heal  · Weak pulse in your legs or feet  Know the Signs of Heart Attack and Stroke  People with PAD are at high risk for heart attack and stroke. Knowing the signs of these problems can help you protect your health and get help when you need it. Call 911 right away if you have any of the following:  Signs of a Heart Attack  · Chest discomfort, such as pain, aching, tightness, or pressure that lasts more than a few minutes, or that comes and goes  · Pain or discomfort in the arms, back, shoulders, neck, or jaw  · Shortness of breath  · Sweating (often a cold, clammy  sweat)  · Nausea  · Lightheadedness  Signs of a Stroke  · Sudden numbness or weakness of the face, arms, or legs, especially on one side  · Sudden confusion or trouble speaking or understanding  · Sudden trouble seeing in one or both eyes  · Sudden trouble walking, dizziness, or loss of balance  · Sudden, severe headache with no known cause   Date Last Reviewed: 9/21/2015  © 6119-0003 [employer]. [address] [place] [address], [place] [address]. All rights reserved. This information is not intended as a substitute for professional medical care. Always follow your healthcare professional's instructions.          Wound Care  Taking proper care of your wound will help it heal. Your healthcare provider may show you how to clean and dress the wound. He or she will also explain how to tell if the wound is healing normally. If you are unsure of how to take care of the wound, be sure to clarify what dressing to use and how often you should change the bandages. Here are the basic steps.     A wound that's not healing normally may be dark in color or have white streaks.   Wash your hands  Tips for washing your hands include:  · Use liquid soap and lather for 2 minutes. Scrub between your fingers and under your nails.  · Rinse with warm water, keeping your fingers pointing down.  · Use a paper towel to dry your hands and to turn off the faucet.  Remove the used dressing  Here are suggestions for removing the dressing:  · If dressing changes cause you pain, be sure to take your pain medicine as prescribed by your healthcare provider 30 minutes before dressing changes.  · Set up your supplies.  · Put on disposable gloves if youre dressing a wound for someone else or your wound is infected.  · Loosen the tape by pulling gently toward the wound.  · Gently take off the old dressing. If the dressing is stuck to the wound, moisten it with saline (if available) or clean water.  · If you have a drain or tube in the wound, be  careful not to pull on it.  · Remove the dressing 1 layer at a time and put it in a plastic bag.  · Remove your gloves.  Inspect and dress the wound  Check the wound carefully:  · Each time you change the dressing, inspect the wound carefully to be sure its healing normally by making sure your wound appears to be pink and moist, and is free of infection.    · Wash your hands again. Put on a new pair of gloves.  · Clean and dress the wound as directed by your healthcare provider or nurse. Do not put anything in the wound that is not prescribed or directed by your healthcare provider. If you have a drain or tube, be careful not to pull on it. Make sure to secure the drain or tube as well.  · Put all supplies in a plastic bag. Seal the bag and put it in the trash.  · Be sure to wash your hands again.  Call your healthcare provider  Call your healthcare provider if you see any of the following signs of a problem:  · Bleeding that soaks the dressing  · Pink fluid weeping from the wound  · Increased drainage or drainage that is yellow, yellow-green, or foul-smelling  · Increased swelling or pain, or redness or swelling in the skin around the wound  · A change in the color of the wound, or if streaks develop in a direction away from the wound  · The area between any stitches opens up  · An increase in the size of the wound  · A fever of 100.4°F (38°C) or higher, or as directed by your healthcare provider  · Chills, increased fatigue, or a loss of appetite      Date Last Reviewed: 7/30/2015  © 0582-4186 [employer]. [address] [place] [address], [place] [address]. All rights reserved. This information is not intended as a substitute for professional medical care. Always follow your healthcare professional's instructions.

## 2020-09-30 NOTE — PROGRESS NOTES
Mitch Chan MD [facility]                         09/30/2020    HPI:  Marvin Ray is a 60 y.o. male with   Patient Active Problem List   Diagnosis    Epiretinal membrane, both eyes    Nuclear sclerotic cataract of right eye    Pseudophakia, left eye    Ptosis, left eyelid    DM type 2 with diabetic peripheral neuropathy    HLD (hyperlipidemia)    Neovascular glaucoma of both eyes    Tophaceous gout    Essential hypertension    Coronary artery disease involving native coronary artery of native heart without angina pectoris    Neovascular glaucoma of left eye, severe stage    Statin myopathy    Neovascular glaucoma, right eye, mild stage    PVD (peripheral vascular disease)    Right wrist pain    Hepatosplenomegaly    Type 2 diabetes mellitus with left diabetic foot ulcer    Other ascites    MDR Acinetobacter baumannii infection    Debility    Subacute osteomyelitis of left foot    Stage 3 chronic kidney disease    Atherosclerosis of left lower extremity with ulceration of midfoot    CKD stage 3 due to type 2 diabetes mellitus    Anemia due to multiple mechanisms    Persistent proteinuria    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  Seen in hospital last mo with LLE cellulitis.  Treated with Abx.  Also had paracentesis for ascites with negative w/u per family.  Patient states location is L foot occurring for 1-2 mo, worsening  foot wound although improvement in edema and erythema.  Associated signs and symptoms include pain and edema.  Quality is aching and severity is 5/10.  Symptoms began 10/2018 spontaneously with blistering and severe edema.  Alleviating factors include wound care and elevation.  Worsening factors include pressure.    Tobacco use: denies    1/4/19: s/p LLE angioplasty and multiple subsequent OR and bedside debridements.  On IV Abx per culture results.  Glucose better controlled.  No fevers.  Receiving local wound care with Santyl.    1/14/19:  Cont to receive local wound care.  Pseudomonas in tissue and bone cultures multidrug resistant other than aminoglycoside; d/w Dr. Velez with high risk of ESRD with 6 weeks of aminoglycoside treatment.  No F/C.    1/31/19:  Pt without complaints.  Was started back on Bactrim.  Family doing wound care.    2/28/19: S/p L foot debridement 2/13/19.  Started on Meropenem.    3/28/19:  No complaints.  S/p paracentesis and pt feels better.    5/16/19:  S/p L peroneal and AT angioplasty, wound healing well.    9/30/19:  S/p L foot debridement 6/5/19. WV placed.    11/2019:  States wound is healing well.  Did not see Plastic Surgery clinic.  Cont wound care center and home health wound care.    2/2020: States wound nearly fully healed.  Receiving wound care daily.    5/2020:  Wounds healing.  Seeing Dr. Davis next week.    7/2020:  Doing well with L foot wound, has developed a R medial maleolus wound due to trauma    8/2020:  Wounds healing BLE.    9/2020: RLE wounds not healing well.  No F/C.    Past Medical History:   Diagnosis Date    Arthritis     Cellulitis     CKD (chronic kidney disease), stage III     Coronary artery disease     Diabetes mellitus     Diabetic retinopathy     Diabetic ulcer of left foot     Glaucoma     Gout     Hyperlipemia     Hypertension     ICD (implantable cardioverter-defibrillator) in place 11/02/2018    Left chest    Non-pressure chronic ulcer  of other part of left foot with fat layer exposed 10/23/2018    PVD (peripheral vascular disease)     Type 2 diabetes mellitus with left diabetic foot ulcer 10/29/2018    Unsteady gait     uses a wheelchair     Past Surgical History:   Procedure Laterality Date    AHMED GLAUCOMA IMPLANT Left 2011    DONE AT [facility]    AORTOGRAPHY WITH SERIALOGRAPHY Left 4/30/2019    Procedure: AORTOGRAM, WITH SERIALOGRAPHY, LEFT LOWER EXTREMITY, POSSIBLE INTERVENTION;  Surgeon: Mitch Wall MD;  Location: [facility] OR;  Service: Vascular;  Laterality: Left;  RN PRE OP 4-23-19.  NO H & P, No Cosent  [place]    BAERVELDT GLAUCOMA IMPLANT Left 2012    WITH CATARACT EXTRACTION//DONE AT [facility]    CARDIAC CATHETERIZATION Left 05/2016    CARDIAC DEFIBRILLATOR PLACEMENT Left 11/02/2018    CATARACT EXTRACTION W/  INTRAOCULAR LENS IMPLANT Left 2012    WITH BAERVEDT//DONE AT [facility]    CATARACT EXTRACTION W/  INTRAOCULAR LENS IMPLANT Right 09/26/2018    COMPLEX ()    CB DESTRUCTION WITH CYCLO G6 Left 02/15/2017        CYST REMOVAL      DEBRIDEMENT OF FOOT  12/28/2018    Procedure: DEBRIDEMENT, FOOT;  Surgeon: Mitch Wall MD;  Location: [facility] OR;  Service: Vascular;;    DEBRIDEMENT OF FOOT  6/5/2019    Procedure: DEBRIDEMENT, FOOT;  Surgeon: Mitch Wall MD;  Location: [facility] OR;  Service: Vascular;;    EXAMINATION UNDER ANESTHESIA Left 12/5/2018    Procedure: Exam under anesthesia, left foot debridement, washout and all other indicated procedures;  Surgeon: Mitch Wall MD;  Location: [facility] OR;  Service: Vascular;  Laterality: Left;  1030AM START  RN PREOP 12/3/2018-----AICD  SEEN BY DR JAMES 12/4    EXAMINATION UNDER ANESTHESIA Left 2/13/2019    Procedure: LEFT FOOT WOUND DEBRIDEMENT, WASHOUT AND ALL OTHER INDICATED PROCEDURES;  Surgeon: Mitch Wall MD;  Location: [facility] OR;  Service: Vascular;  Laterality: Left;  requesting 1st case start  THIS CASE 1ST PER DR WALL ON  2/1/19 @ 844AM -  RN PREOP 2/6/2019  HAS CARDIAC CLEARANCE    EXAMINATION UNDER ANESTHESIA Left 12/28/2018    Procedure: Exam under anesthesia, left foot debridement, left foot washout, possible left second through fifth metatarsal resection;  Surgeon: Mitch Chan MD;  Location: [facility] OR;  Service: Vascular;  Laterality: Left;  1:00pm start per julissa @ 8:58am  RN  PHONE PRE OP 12-27-18--=Pt coming from [facility]  NEED CONSENT    EXAMINATION UNDER ANESTHESIA Left 6/5/2019    Procedure: LEFT FOOT DEBRIDEMENT, WASHOUT AND ALL OTHER INDICATED PROCEDURES;  Surgeon: Mitch Chan MD;  Location: [facility] OR;  Service: Vascular;  Laterality: Left;  RN PRE OP 5-31-19------ICD------NEED CONSENT    INCISION AND DRAINAGE Left 11/14/2018    Procedure: Incision and Drainage, left lower extremity debridement, washout;  Surgeon: Mitch Chan MD;  Location: [facility] OR;  Service: Vascular;  Laterality: Left;    INCISION AND DRAINAGE Left 11/16/2018    Procedure: INCISION AND DRAINAGE;  Surgeon: Mitch Chan MD;  Location: [facility] OR;  Service: Vascular;  Laterality: Left;    INTRAOCULAR PROSTHESES INSERTION Right 9/26/2018    Procedure: INSERTION, IOL PROSTHESIS;  Surgeon: Perla Cortés MD;  Location: [address];  Service: Ophthalmology;  Laterality: Right;    PERITONEOCENTESIS N/A 3/1/2019    Procedure: PARACENTESIS, ABDOMINAL, INITIAL;  Surgeon: [facility] Diagnostic Provider;  Location: [facility] OR;  Service: Radiology;  Laterality: N/A;    PERITONEOCENTESIS N/A 3/25/2019    Procedure: PARACENTESIS, ABDOMINAL, INITIAL;  Surgeon: [facility] Diagnostic Provider;  Location: [facility] OR;  Service: Radiology;  Laterality: N/A;    PERITONEOCENTESIS N/A 7/22/2019    Procedure: PARACENTESIS, ABDOMINAL, INITIAL;  Surgeon: [facility] Diagnostic Provider;  Location: [facility] OR;  Service: Radiology;  Laterality: N/A;    PERITONEOCENTESIS N/A 8/16/2019    Procedure: PARACENTESIS, ABDOMINAL, INITIAL;  Surgeon: [facility]  Diagnostic Provider;  Location: [facility] OR;  Service: Radiology;  Laterality: N/A;    PERITONEOCENTESIS N/A 9/26/2019    Procedure: PARACENTESIS, ABDOMINAL;  Surgeon: [facility] Diagnostic Provider;  Location: [facility] OR;  Service: Radiology;  Laterality: N/A;    PERITONEOCENTESIS N/A 10/11/2019    Procedure: PARACENTESIS, ABDOMINAL;  Surgeon: [facility] Diagnostic Provider;  Location: [facility] OR;  Service: Radiology;  Laterality: N/A;    PERITONEOCENTESIS N/A 10/31/2019    Procedure: PARACENTESIS, ABDOMINAL;  Surgeon: [facility] Diagnostic Provider;  Location: [facility] OR;  Service: Radiology;  Laterality: N/A;    PERITONEOCENTESIS N/A 11/18/2019    Procedure: PARACENTESIS, ABDOMINAL;  Surgeon: [facility] Diagnostic Provider;  Location: [facility] OR;  Service: Radiology;  Laterality: N/A;    PERITONEOCENTESIS N/A 12/16/2019    Procedure: PARACENTESIS, ABDOMINAL;  Surgeon: [facility] Diagnostic Provider;  Location: [facility] OR;  Service: Radiology;  Laterality: N/A;  2PM START    PERITONEOCENTESIS N/A 2/7/2020    Procedure: PARACENTESIS, ABDOMINAL;  Surgeon: [facility] Diagnostic Provider;  Location: [facility] OR;  Service: Radiology;  Laterality: N/A;  REQUESTED 10:30A START    PERITONEOCENTESIS N/A 2/19/2020    Procedure: PARACENTESIS, ABDOMINAL;  Surgeon: [facility] Diagnostic Provider;  Location: [facility] OR;  Service: Radiology;  Laterality: N/A;    PERITONEOCENTESIS N/A 2/28/2020    Procedure: PARACENTESIS, ABDOMINAL;  Surgeon: [facility] Diagnostic Provider;  Location: [facility] OR;  Service: Radiology;  Laterality: N/A;  REQUESTED 10AM START    PHACOEMULSIFICATION OF CATARACT Right 9/26/2018    Procedure: PHACOEMULSIFICATION, CATARACT;  Surgeon: Perla Cortés MD;  [facility];  Service: Ophthalmology;  Laterality: Right;    REPEAT ABDOMINAL PARACENTESIS N/A 2/11/2019    Procedure: PARACENTESIS, ABDOMINAL, REPEAT;  Surgeon: [facility] Diagnostic Provider;  Location: [facility] OR;  Service: Radiology;  Laterality: N/A;  1PM START    REPEAT ABDOMINAL PARACENTESIS N/A  9/12/2019    Procedure: PARACENTESIS, ABDOMINAL, REPEAT;  Surgeon: [facility] Diagnostic Provider;  Location: [facility] OR;  Service: Radiology;  Laterality: N/A;  9AM START    REPEAT ABDOMINAL PARACENTESIS N/A 12/2/2019    Procedure: PARACENTESIS, ABDOMINAL, REPEAT;  Surgeon: [facility] Diagnostic Provider;  Location: [facility] OR;  Service: Radiology;  Laterality: N/A;  3PM START    REPEAT ABDOMINAL PARACENTESIS N/A 1/10/2020    Procedure: PARACENTESIS, ABDOMINAL, REPEAT;  Surgeon: [facility] Diagnostic Provider;  Location: [facility] OR;  Service: Radiology;  Laterality: N/A;  1130AM START    REPEAT ABDOMINAL PARACENTESIS N/A 1/20/2020    Procedure: PARACENTESIS, ABDOMINAL, REPEAT;  Surgeon: [facility] Diagnostic Provider;  Location: [facility] OR;  Service: Radiology;  Laterality: N/A;  1PM START    REPEAT ABDOMINAL PARACENTESIS N/A 1/31/2020    Procedure: PARACENTESIS, ABDOMINAL, REPEAT;  Surgeon: [facility] Diagnostic Provider;  Location: [facility] OR;  Service: Radiology;  Laterality: N/A;  10AM START    REPEAT ABDOMINAL PARACENTESIS N/A 3/16/2020    Procedure: PARACENTESIS, ABDOMINAL, REPEAT;  Surgeon: [facility] Diagnostic Provider;  Location: [facility] OR;  Service: Radiology;  Laterality: N/A;  10AM START    REPEAT ABDOMINAL PARACENTESIS N/A 3/30/2020    Procedure: PARACENTESIS, ABDOMINAL, REPEAT;  Surgeon: [facility] Diagnostic Provider;  Location: [facility] OR;  Service: Radiology;  Laterality: N/A;    REPEAT ABDOMINAL PARACENTESIS N/A 4/9/2020    Procedure: PARACENTESIS, ABDOMINAL, REPEAT;  Surgeon: [facility] Diagnostic Provider;  Location: [facility] OR;  Service: Radiology;  Laterality: N/A;  1130AM START    REPEAT ABDOMINAL PARACENTESIS N/A 5/8/2020    Procedure: PARACENTESIS, ABDOMINAL, REPEAT;  Surgeon: [facility] Diagnostic Provider;  Location: [facility];  Service: Radiology;  Laterality: N/A;  9AM START    REPEAT ABDOMINAL PARACENTESIS N/A 5/21/2020    Procedure: PARACENTESIS, ABDOMINAL, REPEAT;  Surgeon: [facility] Diagnostic Provider;  Location: [facility] OR;  Service: Radiology;   Laterality: N/A;  10AM START    REPEAT ABDOMINAL PARACENTESIS N/A 6/5/2020    Procedure: PARACENTESIS, ABDOMINAL, REPEAT;  Surgeon: [facility] Diagnostic Provider;  Location: [facility] OR;  Service: Radiology;  Laterality: N/A;  NOON START    REPEAT ABDOMINAL PARACENTESIS N/A 7/10/2020    Procedure: PARACENTESIS, ABDOMINAL, REPEAT;  Surgeon: [facility] Diagnostic Provider;  Location: [facility] OR;  Service: Radiology;  Laterality: N/A;  1PM START    REPEAT ABDOMINAL PARACENTESIS N/A 8/20/2020    Procedure: PARACENTESIS, ABDOMINAL, REPEAT;  Surgeon: [facility] Diagnostic Provider;  Location: [facility] OR;  Service: Radiology;  Laterality: N/A;  1PM START    REPEAT ABDOMINAL PARACENTESIS N/A 9/4/2020    Procedure: PARACENTESIS, ABDOMINAL, REPEAT;  Surgeon: [facility] Diagnostic Provider;  Location: [facility] OR;  Service: Radiology;  Laterality: N/A;  11 AM START    REPEAT ABDOMINAL PARACENTESIS N/A 9/16/2020    Procedure: PARACENTESIS, ABDOMINAL, REPEAT;  Surgeon: [facility] Diagnostic Provider;  Location: [facility] OR;  Service: Radiology;  Laterality: N/A;  START 2:00 P.M.    REPEAT ABDOMINAL PARACENTESIS N/A 9/28/2020    Procedure: PARACENTESIS, ABDOMINAL, REPEAT;  Surgeon: [facility] Diagnostic Provider;  Location: [facility] OR;  Service: Radiology;  Laterality: N/A;  1 P.M. START    REVISION OF PROCEDURE INVOLVING GLAUCOMA DRAINAGE DEVICE Left 10/2/2019    EXTRUDING BAERVELDT /WITH PERICARDIAL PATCH GRAFT ()    Right foot surgery  10/2014    TOE AMPUTATION Right     first and second    TOE AMPUTATION Left 11/16/2018    Procedure: AMPUTATION, TOES  2-5;  Surgeon: Mitch Chan MD;  Location: [facility] OR;  Service: Vascular;  Laterality: Left;    TOE AMPUTATION Left 12/5/2018    Procedure: AMPUTATION, TOE;  Surgeon: Mitch Chan MD;  Location: [facility] OR;  Service: Vascular;  Laterality: Left;  left great toe    TONSILLECTOMY       Family History   Problem Relation Age of Onset    Diabetes Mother     Heart disease Brother     No Known Problems  Father     No Known Problems Sister     No Known Problems Maternal Aunt     No Known Problems Maternal Uncle     No Known Problems Paternal Aunt     No Known Problems Paternal Uncle     No Known Problems Maternal Grandmother     No Known Problems Maternal Grandfather     No Known Problems Paternal Grandmother     No Known Problems Paternal Grandfather     Anemia Neg Hx     Arrhythmia Neg Hx     Asthma Neg Hx     Clotting disorder Neg Hx     Fainting Neg Hx     Heart attack Neg Hx     Heart failure Neg Hx     Hyperlipidemia Neg Hx     Hypertension Neg Hx     Stroke Neg Hx     Atrial Septal Defect Neg Hx     Amblyopia Neg Hx     Blindness Neg Hx     Glaucoma Neg Hx     Macular degeneration Neg Hx     Retinal detachment Neg Hx     Strabismus Neg Hx      Social History     Socioeconomic History    Marital status: Single     Spouse name: Not on file    Number of children: Not on file    Years of education: 14    Highest education level: Not on file   Occupational History    Not on file   Social Needs    Financial resource strain: Not on file    Food insecurity     Worry: Not on file     Inability: Not on file    Transportation needs     Medical: Not on file     Non-medical: Not on file   Tobacco Use    Smoking status: Former Smoker     Packs/day: 1.00     Years: 3.00     Pack years: 3.00     Types: Cigarettes     Quit date: 1984     Years since quittin.2    Smokeless tobacco: Never Used   Substance and Sexual Activity    Alcohol use: Not Currently     Alcohol/week: 1.0 standard drinks     Types: 1 Cans of beer per week     Comment: rarely    Drug use: No    Sexual activity: Not Currently     Partners: Female   Lifestyle    Physical activity     Days per week: Not on file     Minutes per session: Not on file    Stress: Not on file   Relationships    Social connections     Talks on phone: Not on file     Gets together: Not on file     Attends [employer] service: Not on  file     Active member of club or organization: Not on file     Attends meetings of clubs or organizations: Not on file     Relationship status: Not on file   Other Topics Concern    Not on file   Social History Narrative    Not on file       Current Outpatient Medications:     allopurinoL (ZYLOPRIM) 300 MG tablet, TAKE 1 TABLET(300 MG) BY MOUTH EVERY DAY, Disp: 30 tablet, Rfl: 3    aspirin (ECOTRIN) 81 MG EC tablet, Take 81 mg by mouth once daily., Disp: , Rfl:     brimonidine 0.2% (ALPHAGAN) 0.2 % Drop, Place 1 drop into both eyes 3 (three) times daily., Disp: 10 mL, Rfl: 11    carvediloL (COREG) 3.125 MG tablet, TAKE 1 TABLET(3.125 MG) BY MOUTH TWICE DAILY, Disp: 60 tablet, Rfl: 1    cephALEXin (KEFLEX) 500 MG capsule, Take 1 capsule (500 mg total) by mouth 4 (four) times daily. for 14 days, Disp: 56 capsule, Rfl: 0    ciprofloxacin HCl (CIPRO) 750 MG tablet, Take 1 tablet (750 mg total) by mouth every 12 (twelve) hours. for 14 days, Disp: 28 tablet, Rfl: 0    coenzyme Q10 100 mg capsule, Take 100 mg by mouth every morning., Disp: , Rfl:     dorzolamide-timolol 2-0.5% (COSOPT) 22.3-6.8 mg/mL ophthalmic solution, Place 1 drop into both eyes 3 (three) times daily., Disp: 1 Bottle, Rfl: 11    ezetimibe (ZETIA) 10 mg tablet, TAKE 1 TABLET(10 MG) BY MOUTH EVERY DAY, Disp: 90 tablet, Rfl: 0    fish oil-omega-3 fatty acids 300-1,000 mg capsule, Take 1 capsule by mouth 2 (two) times daily., Disp: 60 capsule, Rfl: 3    gabapentin (NEURONTIN) 100 MG capsule, TAKE 2 CAPSULES(200 MG) BY MOUTH EVERY EVENING, Disp: 60 capsule, Rfl: 1    insulin (LANTUS SOLOSTAR U-100 INSULIN) glargine 100 units/mL (3mL) SubQ pen, Inject 35 units once daily, Disp: 15 mL, Rfl: 3    MULTIVIT,THER IRON,CA,FA & MIN (MULTIVITAMIN) Tab, Take 1 tablet by mouth every morning. , Disp: , Rfl:     bisacodyl (DULCOLAX) 5 mg EC tablet, Take 5 mg by mouth daily as needed for Constipation., Disp: , Rfl:     liraglutide 0.6 mg/0.1 mL, 18 mg/3  "mL, subq PNIJ (VICTOZA 3-MEMO) 0.6 mg/0.1 mL (18 mg/3 mL) PnIj pen, Inject 1.8 mg into the skin once daily. (Patient not taking: Reported on 9/30/2020), Disp: 9 mL, Rfl: 3    pen needle, diabetic (BD ULTRA-FINE AMADOR PEN NEEDLE) 32 gauge x 5/32" Ndle, 1 Device by Misc.(Non-Drug; Combo Route) route 2 (two) times a day. (Patient not taking: Reported on 9/30/2020), Disp: 100 each, Rfl: 11    torsemide (DEMADEX) 20 MG Tab, Take 4 tablets (80 mg total) by mouth 2 (two) times daily., Disp: 240 tablet, Rfl: 2  No current facility-administered medications for this visit.     Facility-Administered Medications Ordered in Other Visits:     clindamycin 900 MG/50 ML D5W 900 mg/50 mL IVPB 900 mg, 900 mg, Intravenous, Q8H, Mitch Chan MD, 900 mg at 06/05/19 1407    lactated ringers infusion, , Intravenous, Continuous, Tam Reynolds MD    lidocaine (PF) 10 mg/ml (1%) injection 10 mg, 1 mL, Intradermal, Once, Tam Reynolds MD    REVIEW OF SYSTEMS:  General: No fevers or chills; ENT: No sore throat; Allergy and Immunology: no persistent infections; Hematological and Lymphatic: No history of bleeding or easy bruising; Endocrine: negative; Respiratory: no cough, shortness of breath, or wheezing; Cardiovascular: no chest pain or dyspnea on exertion; Gastrointestinal: no abdominal pain/back, change in bowel habits, or bloody stools; Genito-Urinary: no dysuria, trouble voiding, or hematuria; Musculoskeletal: negative; Neurological: no TIA or stroke symptoms; Psychiatric: no nervousness, anxiety or depression.    PHYSICAL EXAM:                General appearance:  Alert, well-appearing, and in no distress.  Oriented to person, place, and time                    Neurological: Normal speech, no focal findings noted; CN II - XII grossly intact. RLE with sensation to light touch, LLE with sensation to light touch.            Musculoskeletal: Digits/nail without cyanosis/clubbing.  Strength 5/5 BLE.                    " Neck: Supple, no significant adenopathy                  Chest:  Clear to auscultation, no wheezes, rales or rhonchi, symmetric air entry. No use of accessory muscles               Cardiac: Normal rate and regular rhythm, S1 and S2 normal            Abdomen: Soft, nontender, nondistended, no masses or organomegaly, no hernia     No rebound tenderness noted; bowel sounds normal     No groin adenopathy      Extremities:   2+ R femoral pulse, 2+ L femoral pulse     doppler+ R popliteal pulse, doppler+ L popliteal pulse     doppler+ R PT pulse, doppler+ L PT pulse     doppler+ R DP pulse, doppler+ L DP pulse     1+ RLE edema, 2+ LLE edema    Skin: RLE with medial ankle, and lower foot medial wounds, bleeding present, no infection or drainage; LLE with minimal brawny edema, improved/smaller foot wound with markedly improved granulation tissue, no odor, no purulence or erythema    LAB RESULTS:  No results found for: CBC  Lab Results   Component Value Date    LABPROT 11.2 05/31/2019    INR 1.1 05/31/2019     Lab Results   Component Value Date     09/29/2020    K 3.8 09/29/2020     09/29/2020    CO2 24 09/29/2020     (H) 09/29/2020    BUN 69 (H) 09/29/2020    CREATININE 1.5 (H) 09/29/2020    CALCIUM 8.3 (L) 09/29/2020    ANIONGAP 11 09/29/2020    EGFRNONAA 49 (A) 09/29/2020     Lab Results   Component Value Date    WBC 8.20 09/29/2020    RBC 4.31 (L) 09/29/2020    HGB 12.3 (L) 09/29/2020    HCT 39.8 (L) 09/29/2020    MCV 92 09/29/2020    MCH 28.5 09/29/2020    MCHC 30.9 (L) 09/29/2020    RDW 17.4 (H) 09/29/2020     09/29/2020    MPV 9.1 (L) 09/29/2020    GRAN 6.5 09/29/2020    GRAN 79.0 (H) 09/29/2020    LYMPH 0.5 (L) 09/29/2020    LYMPH 5.7 (L) 09/29/2020    MONO 0.8 09/29/2020    MONO 9.4 09/29/2020    EOS 0.4 09/29/2020    BASO 0.08 09/29/2020    EOSINOPHIL 4.4 09/29/2020    BASOPHIL 1.0 09/29/2020    DIFFMETHOD Automated 09/29/2020     .  Lab Results   Component Value Date    HGBA1C 8.7 (H)  09/04/2020       IMAGING:  All pertinent imaging has been reviewed and interpreted independently.    BLE arterial US 1/2019: No focal stenosis    5/16/19: BLE with no HD significant stenosis, SIDRA 0.8/1.46, left ankle pressures 60 and 171    7/29/19: BLE arterial US with approx 50% R TPT stenosis, SIDRA 0.8; LLE showing no HD significant stenosis, SIDRA 0.66    11/4/19: SIDRA 0.4/0.4, DP vessel NC bilaterally, R toe waveform normal  Right lower extremity arterial ultrasound shows no hemodynamically significant stenosis.  Pressures indicate moderate arterial occlusive disease.  Left lower extremity arterial ultrasound shows no hemodynamically significant stenosis.  Pressures indicate moderate arterial occlusive disease.    5/2020:  1. Right lower extremity pressures and waveforms indicate moderate arterial occlusive disease.  2. Left lower extremity pressures and waveforms indicate no hemodynamically significant arterial occlusive disease.    7/2020:  1. Right lower extremity arterial ultrasound shows approximately 50% TP trunk stenosis and an occluded PT artery.  There is no hemodynamically significant stenosis.  Pressures indicate severe arterial occlusive disease.  2. Left lower extremity arterial ultrasound shows TP trunk hemodynamically significant stenosis and an occluded PT artery.  Pressures indicate severe arterial occlusive disease.    1. Right lower extremity pressures and waveforms indicate moderate to severe arterial occlusive disease.  2. Left lower extremity pressures and waveforms indicate moderate to severe arterial occlusive disease.    IMP/PLAN:  60 y.o. male with   Patient Active Problem List   Diagnosis    Epiretinal membrane, both eyes    Nuclear sclerotic cataract of right eye    Pseudophakia, left eye    Ptosis, left eyelid    DM type 2 with diabetic peripheral neuropathy    HLD (hyperlipidemia)    Neovascular glaucoma of both eyes    Tophaceous gout    Essential hypertension    Coronary  artery disease involving native coronary artery of native heart without angina pectoris    Neovascular glaucoma of left eye, severe stage    Statin myopathy    Neovascular glaucoma, right eye, mild stage    PVD (peripheral vascular disease)    Right wrist pain    Hepatosplenomegaly    Type 2 diabetes mellitus with left diabetic foot ulcer    Other ascites    MDR Acinetobacter baumannii infection    Debility    Subacute osteomyelitis of left foot    Stage 3 chronic kidney disease    Atherosclerosis of left lower extremity with ulceration of midfoot    CKD stage 3 due to type 2 diabetes mellitus    Anemia due to multiple mechanisms    Persistent proteinuria    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    -Improving L foot wound improved s/p debridement 2/13/19, multifactorial due to diabetes, PVD and venous insufficiency with improvement in granulation tissue on Abx due to multidrug resistent bacteria in the past s/p debridements and L tibial angioplasty x 2 with improvement in wound +granulation tissue s/p debridement 6/5/19 and [place] placement; anticipate healing soon  -Cont ID rec Abx regimen  -Recommend continued wound care center care - seeing Dr. Davis; may benefit from hyperbaric O2 therapy  -R ankle / foot wounds - cont wound care and if no significant improvement in 4 weeks - rec RLE angiogram/revascularization  -Rec BLE Xeroform and dry kerlix wraps twice daily to shin regions with elevation of  LLE above level of the heart  -Low sodium diet  -Strict glycemic control    I spent 11 minutes evaluating this patient and greater than 50% of the time was spent counseling, coordinator care and discussing the plan of care.  All questions were answered and patient stated understanding with agreement with the above treatment plan.    Mitch Chan MD [facility]  Vascular and Endovascular Surgery

## 2020-09-30 NOTE — H&P (VIEW-ONLY)
Mitch Chan MD [facility]                         09/30/2020    HPI:  Marvin Ray is a 60 y.o. male with   Patient Active Problem List   Diagnosis    Epiretinal membrane, both eyes    Nuclear sclerotic cataract of right eye    Pseudophakia, left eye    Ptosis, left eyelid    DM type 2 with diabetic peripheral neuropathy    HLD (hyperlipidemia)    Neovascular glaucoma of both eyes    Tophaceous gout    Essential hypertension    Coronary artery disease involving native coronary artery of native heart without angina pectoris    Neovascular glaucoma of left eye, severe stage    Statin myopathy    Neovascular glaucoma, right eye, mild stage    PVD (peripheral vascular disease)    Right wrist pain    Hepatosplenomegaly    Type 2 diabetes mellitus with left diabetic foot ulcer    Other ascites    MDR Acinetobacter baumannii infection    Debility    Subacute osteomyelitis of left foot    Stage 3 chronic kidney disease    Atherosclerosis of left lower extremity with ulceration of midfoot    CKD stage 3 due to type 2 diabetes mellitus    Anemia due to multiple mechanisms    Persistent proteinuria    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  Seen in hospital last mo with LLE cellulitis.  Treated with Abx.  Also had paracentesis for ascites with negative w/u per family.  Patient states location is L foot occurring for 1-2 mo, worsening  foot wound although improvement in edema and erythema.  Associated signs and symptoms include pain and edema.  Quality is aching and severity is 5/10.  Symptoms began 10/2018 spontaneously with blistering and severe edema.  Alleviating factors include wound care and elevation.  Worsening factors include pressure.    Tobacco use: denies    1/4/19: s/p LLE angioplasty and multiple subsequent OR and bedside debridements.  On IV Abx per culture results.  Glucose better controlled.  No fevers.  Receiving local wound care with Santyl.    1/14/19:  Cont to receive local wound care.  Pseudomonas in tissue and bone cultures multidrug resistant other than aminoglycoside; d/w Dr. Velez with high risk of ESRD with 6 weeks of aminoglycoside treatment.  No F/C.    1/31/19:  Pt without complaints.  Was started back on Bactrim.  Family doing wound care.    2/28/19: S/p L foot debridement 2/13/19.  Started on Meropenem.    3/28/19:  No complaints.  S/p paracentesis and pt feels better.    5/16/19:  S/p L peroneal and AT angioplasty, wound healing well.    9/30/19:  S/p L foot debridement 6/5/19. WV placed.    11/2019:  States wound is healing well.  Did not see Plastic Surgery clinic.  Cont wound care center and home health wound care.    2/2020: States wound nearly fully healed.  Receiving wound care daily.    5/2020:  Wounds healing.  Seeing Dr. Davis next week.    7/2020:  Doing well with L foot wound, has developed a R medial maleolus wound due to trauma    8/2020:  Wounds healing BLE.    9/2020: RLE wounds not healing well.  No F/C.    Past Medical History:   Diagnosis Date    Arthritis     Cellulitis     CKD (chronic kidney disease), stage III     Coronary artery disease     Diabetes mellitus     Diabetic retinopathy     Diabetic ulcer of left foot     Glaucoma     Gout     Hyperlipemia     Hypertension     ICD (implantable cardioverter-defibrillator) in place 11/02/2018    Left chest    Non-pressure chronic ulcer  of other part of left foot with fat layer exposed 10/23/2018    PVD (peripheral vascular disease)     Type 2 diabetes mellitus with left diabetic foot ulcer 10/29/2018    Unsteady gait     uses a wheelchair     Past Surgical History:   Procedure Laterality Date    AHMED GLAUCOMA IMPLANT Left 2011    DONE AT [facility]    AORTOGRAPHY WITH SERIALOGRAPHY Left 4/30/2019    Procedure: AORTOGRAM, WITH SERIALOGRAPHY, LEFT LOWER EXTREMITY, POSSIBLE INTERVENTION;  Surgeon: Mitch Wall MD;  Location: [facility] OR;  Service: Vascular;  Laterality: Left;  RN PRE OP 4-23-19.  NO H & P, No Cosent  [place]    BAERVELDT GLAUCOMA IMPLANT Left 2012    WITH CATARACT EXTRACTION//DONE AT [facility]    CARDIAC CATHETERIZATION Left 05/2016    CARDIAC DEFIBRILLATOR PLACEMENT Left 11/02/2018    CATARACT EXTRACTION W/  INTRAOCULAR LENS IMPLANT Left 2012    WITH BAERVEDT//DONE AT [facility]    CATARACT EXTRACTION W/  INTRAOCULAR LENS IMPLANT Right 09/26/2018    COMPLEX ()    CB DESTRUCTION WITH CYCLO G6 Left 02/15/2017        CYST REMOVAL      DEBRIDEMENT OF FOOT  12/28/2018    Procedure: DEBRIDEMENT, FOOT;  Surgeon: Mitch Wall MD;  Location: [facility] OR;  Service: Vascular;;    DEBRIDEMENT OF FOOT  6/5/2019    Procedure: DEBRIDEMENT, FOOT;  Surgeon: Mitch Wall MD;  Location: [facility] OR;  Service: Vascular;;    EXAMINATION UNDER ANESTHESIA Left 12/5/2018    Procedure: Exam under anesthesia, left foot debridement, washout and all other indicated procedures;  Surgeon: Mitch Wall MD;  Location: [facility] OR;  Service: Vascular;  Laterality: Left;  1030AM START  RN PREOP 12/3/2018-----AICD  SEEN BY DR JAMES 12/4    EXAMINATION UNDER ANESTHESIA Left 2/13/2019    Procedure: LEFT FOOT WOUND DEBRIDEMENT, WASHOUT AND ALL OTHER INDICATED PROCEDURES;  Surgeon: Mitch Wall MD;  Location: [facility] OR;  Service: Vascular;  Laterality: Left;  requesting 1st case start  THIS CASE 1ST PER DR WALL ON  2/1/19 @ 844AM -  RN PREOP 2/6/2019  HAS CARDIAC CLEARANCE    EXAMINATION UNDER ANESTHESIA Left 12/28/2018    Procedure: Exam under anesthesia, left foot debridement, left foot washout, possible left second through fifth metatarsal resection;  Surgeon: Mitch Chan MD;  Location: [facility] OR;  Service: Vascular;  Laterality: Left;  1:00pm start per julissa @ 8:58am  RN  PHONE PRE OP 12-27-18--=Pt coming from [facility]  NEED CONSENT    EXAMINATION UNDER ANESTHESIA Left 6/5/2019    Procedure: LEFT FOOT DEBRIDEMENT, WASHOUT AND ALL OTHER INDICATED PROCEDURES;  Surgeon: Mitch Chan MD;  Location: [facility] OR;  Service: Vascular;  Laterality: Left;  RN PRE OP 5-31-19------ICD------NEED CONSENT    INCISION AND DRAINAGE Left 11/14/2018    Procedure: Incision and Drainage, left lower extremity debridement, washout;  Surgeon: Mitch Chan MD;  Location: [facility] OR;  Service: Vascular;  Laterality: Left;    INCISION AND DRAINAGE Left 11/16/2018    Procedure: INCISION AND DRAINAGE;  Surgeon: Mitch Chan MD;  Location: [facility] OR;  Service: Vascular;  Laterality: Left;    INTRAOCULAR PROSTHESES INSERTION Right 9/26/2018    Procedure: INSERTION, IOL PROSTHESIS;  Surgeon: Perla Cortés MD;  Location: [address];  Service: Ophthalmology;  Laterality: Right;    PERITONEOCENTESIS N/A 3/1/2019    Procedure: PARACENTESIS, ABDOMINAL, INITIAL;  Surgeon: [facility] Diagnostic Provider;  Location: [facility] OR;  Service: Radiology;  Laterality: N/A;    PERITONEOCENTESIS N/A 3/25/2019    Procedure: PARACENTESIS, ABDOMINAL, INITIAL;  Surgeon: [facility] Diagnostic Provider;  Location: [facility] OR;  Service: Radiology;  Laterality: N/A;    PERITONEOCENTESIS N/A 7/22/2019    Procedure: PARACENTESIS, ABDOMINAL, INITIAL;  Surgeon: [facility] Diagnostic Provider;  Location: [facility] OR;  Service: Radiology;  Laterality: N/A;    PERITONEOCENTESIS N/A 8/16/2019    Procedure: PARACENTESIS, ABDOMINAL, INITIAL;  [facility]  Diagnostic Provider;  Location: [facility] OR;  Service: Radiology;  Laterality: N/A;    PERITONEOCENTESIS N/A 9/26/2019    Procedure: PARACENTESIS, ABDOMINAL;  Surgeon: [facility] Diagnostic Provider;  Location: [facility] OR;  Service: Radiology;  Laterality: N/A;    PERITONEOCENTESIS N/A 10/11/2019    Procedure: PARACENTESIS, ABDOMINAL;  Surgeon: [facility] Diagnostic Provider;  Location: [facility] OR;  Service: Radiology;  Laterality: N/A;    PERITONEOCENTESIS N/A 10/31/2019    Procedure: PARACENTESIS, ABDOMINAL;  Surgeon: D[facility] Diagnostic Provider;  Location: [facility] OR;  Service: Radiology;  Laterality: N/A;    PERITONEOCENTESIS N/A 11/18/2019    Procedure: PARACENTESIS, ABDOMINAL;  Surgeon: D[facility] Diagnostic Provider;  Location: [facility] OR;  Service: Radiology;  Laterality: N/A;    PERITONEOCENTESIS N/A 12/16/2019    Procedure: PARACENTESIS, ABDOMINAL;  Surgeon: [facility] Diagnostic Provider;  Location: [facility] OR;  Service: Radiology;  Laterality: N/A;  2PM START    PERITONEOCENTESIS N/A 2/7/2020    Procedure: PARACENTESIS, ABDOMINAL;  Surgeon: D[facility] Diagnostic Provider;  Location: [facility] OR;  Service: Radiology;  Laterality: N/A;  REQUESTED 10:30A START    PERITONEOCENTESIS N/A 2/19/2020    Procedure: PARACENTESIS, ABDOMINAL;  Surgeon: D[facility] Diagnostic Provider;  Location: [facility] OR;  Service: Radiology;  Laterality: N/A;    PERITONEOCENTESIS N/A 2/28/2020    Procedure: PARACENTESIS, ABDOMINAL;  Surgeon: D[facility] Diagnostic Provider;  Location: [facility] OR;  Service: Radiology;  Laterality: N/A;  REQUESTED 10AM START    PHACOEMULSIFICATION OF CATARACT Right 9/26/2018    Procedure: PHACOEMULSIFICATION, CATARACT;  Surgeon: Perla Cortés MD;  [facility];  Service: Ophthalmology;  Laterality: Right;    REPEAT ABDOMINAL PARACENTESIS N/A 2/11/2019    Procedure: PARACENTESIS, ABDOMINAL, REPEAT;  Surgeon: [facility] Diagnostic Provider;  Location: [facility] OR;  Service: Radiology;  Laterality: N/A;  1PM START    REPEAT ABDOMINAL PARACENTESIS N/A  9/12/2019    Procedure: PARACENTESIS, ABDOMINAL, REPEAT;  Surgeon: D[facility] Diagnostic Provider;  Location: [facility] OR;  Service: Radiology;  Laterality: N/A;  9AM START    REPEAT ABDOMINAL PARACENTESIS N/A 12/2/2019    Procedure: PARACENTESIS, ABDOMINAL, REPEAT;  Surgeon: [facility] Diagnostic Provider;  Location: [facility] OR;  Service: Radiology;  Laterality: N/A;  3PM START    REPEAT ABDOMINAL PARACENTESIS N/A 1/10/2020    Procedure: PARACENTESIS, ABDOMINAL, REPEAT;  Surgeon: Dosc Diagnostic Provider;  [facility]: [facility];  Service: Radiology;  Laterality: N/A;  1130AM START    REPEAT ABDOMINAL PARACENTESIS N/A 1/20/2020    Procedure: PARACENTESIS, ABDOMINAL, REPEAT;  Surgeon: Dosc Diagnostic Provider;  Location: [facility] OR;  Service: Radiology;  Laterality: N/A;  1PM START    REPEAT ABDOMINAL PARACENTESIS N/A 1/31/2020    Procedure: PARACENTESIS, ABDOMINAL, REPEAT;  Surgeon: Dosc Diagnostic Provider;  Location: [facility] OR;  Service: Radiology;  Laterality: N/A;  10AM START    REPEAT ABDOMINAL PARACENTESIS N/A 3/16/2020    Procedure: PARACENTESIS, ABDOMINAL, REPEAT;  Surgeon: D[facility] Diagnostic Provider;  Location: [facility] OR;  Service: Radiology;  Laterality: N/A;  10AM START    REPEAT ABDOMINAL PARACENTESIS N/A 3/30/2020    Procedure: PARACENTESIS, ABDOMINAL, REPEAT;  Surgeon: Dosc Diagnostic Provider;  Location: [facility] OR;  Service: Radiology;  Laterality: N/A;    REPEAT ABDOMINAL PARACENTESIS N/A 4/9/2020    Procedure: PARACENTESIS, ABDOMINAL, REPEAT;  Surgeon: Dosc Diagnostic Provider;  Location: [facility] OR;  Service: Radiology;  Laterality: N/A;  1130AM START    REPEAT ABDOMINAL PARACENTESIS N/A 5/8/2020    Procedure: PARACENTESIS, ABDOMINAL, REPEAT;  Surgeon: [facility] Diagnostic Provider;  [facility];  Service: Radiology;  Laterality: N/A;  9AM START    REPEAT ABDOMINAL PARACENTESIS N/A 5/21/2020    Procedure: PARACENTESIS, ABDOMINAL, REPEAT;  Surgeon: D[facility] Diagnostic Provider;  Location: [facility] OR;  Service: Radiology;   Laterality: N/A;  10AM START    REPEAT ABDOMINAL PARACENTESIS N/A 6/5/2020    Procedure: PARACENTESIS, ABDOMINAL, REPEAT;  Surgeon: Dosc Diagnostic Provider;  Location: [facility] OR;  Service: Radiology;  Laterality: N/A;  NOON START    REPEAT ABDOMINAL PARACENTESIS N/A 7/10/2020    Procedure: PARACENTESIS, ABDOMINAL, REPEAT;  Surgeon: Dosc Diagnostic Provider;  Location: [facility] OR;  Service: Radiology;  Laterality: N/A;  1PM START    REPEAT ABDOMINAL PARACENTESIS N/A 8/20/2020    Procedure: PARACENTESIS, ABDOMINAL, REPEAT;  Surgeon: Dosc Diagnostic Provider;  Location: [facility] OR;  Service: Radiology;  Laterality: N/A;  1PM START    REPEAT ABDOMINAL PARACENTESIS N/A 9/4/2020    Procedure: PARACENTESIS, ABDOMINAL, REPEAT;  Surgeon: D[facility] Diagnostic Provider;  Location: [facility] OR;  Service: Radiology;  Laterality: N/A;  11 AM START    REPEAT ABDOMINAL PARACENTESIS N/A 9/16/2020    Procedure: PARACENTESIS, ABDOMINAL, REPEAT;  Surgeon: Dos Diagnostic Provider;  Location: [facility] OR;  Service: Radiology;  Laterality: N/A;  START 2:00 P.M.    REPEAT ABDOMINAL PARACENTESIS N/A 9/28/2020    Procedure: PARACENTESIS, ABDOMINAL, REPEAT;  Surgeon: Dos Diagnostic Provider;  Location: [facility] OR;  Service: Radiology;  Laterality: N/A;  1 P.M. START    REVISION OF PROCEDURE INVOLVING GLAUCOMA DRAINAGE DEVICE Left 10/2/2019    EXTRUDING BAERVELDT /WITH PERICARDIAL PATCH GRAFT ()    Right foot surgery  10/2014    TOE AMPUTATION Right     first and second    TOE AMPUTATION Left 11/16/2018    Procedure: AMPUTATION, TOES  2-5;  Surgeon: Mitch Chan MD;  Location: [facility] OR;  Service: Vascular;  Laterality: Left;    TOE AMPUTATION Left 12/5/2018    Procedure: AMPUTATION, TOE;  Surgeon: Mitch Chan MD;  Location: [facility] OR;  Service: Vascular;  Laterality: Left;  left great toe    TONSILLECTOMY       Family History   Problem Relation Age of Onset    Diabetes Mother     Heart disease Brother     No Known Problems  Father     No Known Problems Sister     No Known Problems Maternal Aunt     No Known Problems Maternal Uncle     No Known Problems Paternal Aunt     No Known Problems Paternal Uncle     No Known Problems Maternal Grandmother     No Known Problems Maternal Grandfather     No Known Problems Paternal Grandmother     No Known Problems Paternal Grandfather     Anemia Neg Hx     Arrhythmia Neg Hx     Asthma Neg Hx     Clotting disorder Neg Hx     Fainting Neg Hx     Heart attack Neg Hx     Heart failure Neg Hx     Hyperlipidemia Neg Hx     Hypertension Neg Hx     Stroke Neg Hx     Atrial Septal Defect Neg Hx     Amblyopia Neg Hx     Blindness Neg Hx     Glaucoma Neg Hx     Macular degeneration Neg Hx     Retinal detachment Neg Hx     Strabismus Neg Hx      Social History     Socioeconomic History    Marital status: Single     Spouse name: Not on file    Number of children: Not on file    Years of education: 14    Highest education level: Not on file   Occupational History    Not on file   Social Needs    Financial resource strain: Not on file    Food insecurity     Worry: Not on file     Inability: Not on file    Transportation needs     Medical: Not on file     Non-medical: Not on file   Tobacco Use    Smoking status: Former Smoker     Packs/day: 1.00     Years: 3.00     Pack years: 3.00     Types: Cigarettes     Quit date: 1984     Years since quittin.2    Smokeless tobacco: Never Used   Substance and Sexual Activity    Alcohol use: Not Currently     Alcohol/week: 1.0 standard drinks     Types: 1 Cans of beer per week     Comment: rarely    Drug use: No    Sexual activity: Not Currently     Partners: Female   Lifestyle    Physical activity     Days per week: Not on file     Minutes per session: Not on file    Stress: Not on file   Relationships    Social connections     Talks on phone: Not on file     Gets together: Not on file     Attends Muslim service: Not on  file     Active member of club or organization: Not on file     Attends meetings of clubs or organizations: Not on file     Relationship status: Not on file   Other Topics Concern    Not on file   Social History Narrative    Not on file       Current Outpatient Medications:     allopurinoL (ZYLOPRIM) 300 MG tablet, TAKE 1 TABLET(300 MG) BY MOUTH EVERY DAY, Disp: 30 tablet, Rfl: 3    aspirin (ECOTRIN) 81 MG EC tablet, Take 81 mg by mouth once daily., Disp: , Rfl:     brimonidine 0.2% (ALPHAGAN) 0.2 % Drop, Place 1 drop into both eyes 3 (three) times daily., Disp: 10 mL, Rfl: 11    carvediloL (COREG) 3.125 MG tablet, TAKE 1 TABLET(3.125 MG) BY MOUTH TWICE DAILY, Disp: 60 tablet, Rfl: 1    cephALEXin (KEFLEX) 500 MG capsule, Take 1 capsule (500 mg total) by mouth 4 (four) times daily. for 14 days, Disp: 56 capsule, Rfl: 0    ciprofloxacin HCl (CIPRO) 750 MG tablet, Take 1 tablet (750 mg total) by mouth every 12 (twelve) hours. for 14 days, Disp: 28 tablet, Rfl: 0    coenzyme Q10 100 mg capsule, Take 100 mg by mouth every morning., Disp: , Rfl:     dorzolamide-timolol 2-0.5% (COSOPT) 22.3-6.8 mg/mL ophthalmic solution, Place 1 drop into both eyes 3 (three) times daily., Disp: 1 Bottle, Rfl: 11    ezetimibe (ZETIA) 10 mg tablet, TAKE 1 TABLET(10 MG) BY MOUTH EVERY DAY, Disp: 90 tablet, Rfl: 0    fish oil-omega-3 fatty acids 300-1,000 mg capsule, Take 1 capsule by mouth 2 (two) times daily., Disp: 60 capsule, Rfl: 3    gabapentin (NEURONTIN) 100 MG capsule, TAKE 2 CAPSULES(200 MG) BY MOUTH EVERY EVENING, Disp: 60 capsule, Rfl: 1    insulin (LANTUS SOLOSTAR U-100 INSULIN) glargine 100 units/mL (3mL) SubQ pen, Inject 35 units once daily, Disp: 15 mL, Rfl: 3    MULTIVIT,THER IRON,CA,FA & MIN (MULTIVITAMIN) Tab, Take 1 tablet by mouth every morning. , Disp: , Rfl:     bisacodyl (DULCOLAX) 5 mg EC tablet, Take 5 mg by mouth daily as needed for Constipation., Disp: , Rfl:     liraglutide 0.6 mg/0.1 mL, 18 mg/3  "mL, subq PNIJ (VICTOZA 3-MEMO) 0.6 mg/0.1 mL (18 mg/3 mL) PnIj pen, Inject 1.8 mg into the skin once daily. (Patient not taking: Reported on 9/30/2020), Disp: 9 mL, Rfl: 3    pen needle, diabetic (BD ULTRA-FINE AMADOR PEN NEEDLE) 32 gauge x 5/32" Ndle, 1 Device by Misc.(Non-Drug; Combo Route) route 2 (two) times a day. (Patient not taking: Reported on 9/30/2020), Disp: 100 each, Rfl: 11    torsemide (DEMADEX) 20 MG Tab, Take 4 tablets (80 mg total) by mouth 2 (two) times daily., Disp: 240 tablet, Rfl: 2  No current facility-administered medications for this visit.     Facility-Administered Medications Ordered in Other Visits:     clindamycin 900 MG/50 ML D5W 900 mg/50 mL IVPB 900 mg, 900 mg, Intravenous, Q8H, Mitch Chan MD, 900 mg at 06/05/19 1407    lactated ringers infusion, , Intravenous, Continuous, Tam Reynolds MD    lidocaine (PF) 10 mg/ml (1%) injection 10 mg, 1 mL, Intradermal, Once, Tam Reynolds MD    REVIEW OF SYSTEMS:  General: No fevers or chills; ENT: No sore throat; Allergy and Immunology: no persistent infections; Hematological and Lymphatic: No history of bleeding or easy bruising; Endocrine: negative; Respiratory: no cough, shortness of breath, or wheezing; Cardiovascular: no chest pain or dyspnea on exertion; Gastrointestinal: no abdominal pain/back, change in bowel habits, or bloody stools; Genito-Urinary: no dysuria, trouble voiding, or hematuria; Musculoskeletal: negative; Neurological: no TIA or stroke symptoms; Psychiatric: no nervousness, anxiety or depression.    PHYSICAL EXAM:                General appearance:  Alert, well-appearing, and in no distress.  Oriented to person, place, and time                    Neurological: Normal speech, no focal findings noted; CN II - XII grossly intact. RLE with sensation to light touch, LLE with sensation to light touch.            Musculoskeletal: Digits/nail without cyanosis/clubbing.  Strength 5/5 BLE.                    " Neck: Supple, no significant adenopathy                  Chest:  Clear to auscultation, no wheezes, rales or rhonchi, symmetric air entry. No use of accessory muscles               Cardiac: Normal rate and regular rhythm, S1 and S2 normal            Abdomen: Soft, nontender, nondistended, no masses or organomegaly, no hernia     No rebound tenderness noted; bowel sounds normal     No groin adenopathy      Extremities:   2+ R femoral pulse, 2+ L femoral pulse     doppler+ R popliteal pulse, doppler+ L popliteal pulse     doppler+ R PT pulse, doppler+ L PT pulse     doppler+ R DP pulse, doppler+ L DP pulse     1+ RLE edema, 2+ LLE edema    Skin: RLE with medial ankle, and lower foot medial wounds, bleeding present, no infection or drainage; LLE with minimal brawny edema, improved/smaller foot wound with markedly improved granulation tissue, no odor, no purulence or erythema    LAB RESULTS:  No results found for: CBC  Lab Results   Component Value Date    LABPROT 11.2 05/31/2019    INR 1.1 05/31/2019     Lab Results   Component Value Date     09/29/2020    K 3.8 09/29/2020     09/29/2020    CO2 24 09/29/2020     (H) 09/29/2020    BUN 69 (H) 09/29/2020    CREATININE 1.5 (H) 09/29/2020    CALCIUM 8.3 (L) 09/29/2020    ANIONGAP 11 09/29/2020    EGFRNONAA 49 (A) 09/29/2020     Lab Results   Component Value Date    WBC 8.20 09/29/2020    RBC 4.31 (L) 09/29/2020    HGB 12.3 (L) 09/29/2020    HCT 39.8 (L) 09/29/2020    MCV 92 09/29/2020    MCH 28.5 09/29/2020    MCHC 30.9 (L) 09/29/2020    RDW 17.4 (H) 09/29/2020     09/29/2020    MPV 9.1 (L) 09/29/2020    GRAN 6.5 09/29/2020    GRAN 79.0 (H) 09/29/2020    LYMPH 0.5 (L) 09/29/2020    LYMPH 5.7 (L) 09/29/2020    MONO 0.8 09/29/2020    MONO 9.4 09/29/2020    EOS 0.4 09/29/2020    BASO 0.08 09/29/2020    EOSINOPHIL 4.4 09/29/2020    BASOPHIL 1.0 09/29/2020    DIFFMETHOD Automated 09/29/2020     .  Lab Results   Component Value Date    HGBA1C 8.7 (H)  09/04/2020       IMAGING:  All pertinent imaging has been reviewed and interpreted independently.    BLE arterial US 1/2019: No focal stenosis    5/16/19: BLE with no HD significant stenosis, SIDRA 0.8/1.46, left ankle pressures 60 and 171    7/29/19: BLE arterial US with approx 50% R TPT stenosis, SIDRA 0.8; LLE showing no HD significant stenosis, SIDRA 0.66    11/4/19: SIDRA 0.4/0.4, DP vessel NC bilaterally, R toe waveform normal  Right lower extremity arterial ultrasound shows no hemodynamically significant stenosis.  Pressures indicate moderate arterial occlusive disease.  Left lower extremity arterial ultrasound shows no hemodynamically significant stenosis.  Pressures indicate moderate arterial occlusive disease.    5/2020:  1. Right lower extremity pressures and waveforms indicate moderate arterial occlusive disease.  2. Left lower extremity pressures and waveforms indicate no hemodynamically significant arterial occlusive disease.    7/2020:  1. Right lower extremity arterial ultrasound shows approximately 50% TP trunk stenosis and an occluded PT artery.  There is no hemodynamically significant stenosis.  Pressures indicate severe arterial occlusive disease.  2. Left lower extremity arterial ultrasound shows TP trunk hemodynamically significant stenosis and an occluded PT artery.  Pressures indicate severe arterial occlusive disease.    1. Right lower extremity pressures and waveforms indicate moderate to severe arterial occlusive disease.  2. Left lower extremity pressures and waveforms indicate moderate to severe arterial occlusive disease.    IMP/PLAN:  60 y.o. male with   Patient Active Problem List   Diagnosis    Epiretinal membrane, both eyes    Nuclear sclerotic cataract of right eye    Pseudophakia, left eye    Ptosis, left eyelid    DM type 2 with diabetic peripheral neuropathy    HLD (hyperlipidemia)    Neovascular glaucoma of both eyes    Tophaceous gout    Essential hypertension    Coronary  artery disease involving native coronary artery of native heart without angina pectoris    Neovascular glaucoma of left eye, severe stage    Statin myopathy    Neovascular glaucoma, right eye, mild stage    PVD (peripheral vascular disease)    Right wrist pain    Hepatosplenomegaly    Type 2 diabetes mellitus with left diabetic foot ulcer    Other ascites    MDR Acinetobacter baumannii infection    Debility    Subacute osteomyelitis of left foot    Stage 3 chronic kidney disease    Atherosclerosis of left lower extremity with ulceration of midfoot    CKD stage 3 due to type 2 diabetes mellitus    Anemia due to multiple mechanisms    Persistent proteinuria    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    -Improving L foot wound improved s/p debridement 2/13/19, multifactorial due to diabetes, PVD and venous insufficiency with improvement in granulation tissue on Abx due to multidrug resistent bacteria in the past s/p debridements and L tibial angioplasty x 2 with improvement in wound +granulation tissue s/p debridement 6/5/19 and W[place] placement; anticipate healing soon  -Cont ID rec Abx regimen  -Recommend continued wound care center care - seeing Dr. Davis; may benefit from hyperbaric O2 therapy  -R ankle / foot wounds - cont wound care and if no significant improvement in 4 weeks - rec RLE angiogram/revascularization  -Rec BLE Xeroform and dry kerlix wraps twice daily to shin regions with elevation of  LLE above level of the heart  -Low sodium diet  -Strict glycemic control    I spent 11 minutes evaluating this patient and greater than 50% of the time was spent counseling, coordinator care and discussing the plan of care.  All questions were answered and patient stated understanding with agreement with the above treatment plan.    Mitch Chan MD [facility]  Vascular and Endovascular Surgery

## 2020-10-01 ENCOUNTER — TELEPHONE (OUTPATIENT)
Dept: WOUND CARE | Facility: HOSPITAL | Age: 60
End: 2020-10-01

## 2020-10-01 ENCOUNTER — HOSPITAL ENCOUNTER (OUTPATIENT)
Dept: PREADMISSION TESTING | Facility: HOSPITAL | Age: 60
Discharge: HOME | End: 2020-10-01
Attending: SURGERY
Payer: MEDICAID

## 2020-10-01 VITALS — BODY MASS INDEX: 27.87 KG/M2 | HEIGHT: 70 IN | WEIGHT: 194.69 LBS

## 2020-10-01 DIAGNOSIS — L97.516 DIABETIC ULCER OF TOE OF RIGHT FOOT ASSOCIATED WITH TYPE 2 DIABETES MELLITUS, WITH BONE INVOLVEMENT WITHOUT EVIDENCE OF NECROSIS: ICD-10-CM

## 2020-10-01 DIAGNOSIS — E11.621 DIABETIC ULCER OF TOE OF RIGHT FOOT ASSOCIATED WITH TYPE 2 DIABETES MELLITUS, WITH BONE INVOLVEMENT WITHOUT EVIDENCE OF NECROSIS: ICD-10-CM

## 2020-10-01 DIAGNOSIS — L97.529 TYPE 2 DIABETES MELLITUS WITH LEFT DIABETIC FOOT ULCER: Primary | ICD-10-CM

## 2020-10-01 DIAGNOSIS — E11.621 TYPE 2 DIABETES MELLITUS WITH LEFT DIABETIC FOOT ULCER: Primary | ICD-10-CM

## 2020-10-01 RX ORDER — (INSULIN DEGLUDEC AND LIRAGLUTIDE) 100; 3.6 [IU]/ML; MG/ML
35 INJECTION, SOLUTION SUBCUTANEOUS DAILY
COMMUNITY
End: 2020-12-09 | Stop reason: SDUPTHER

## 2020-10-01 NOTE — DISCHARGE INSTRUCTIONS
"Your angiogram is scheduled for__10/6/2020_____________    Call [phone] between 2pm and 5pm on _10/5/2020___________to find out your arrival time for the day of your procedure.    Report to Same Day Surgery Unit at ____ AM on the 2nd floor of the hospital.  Use the front entrance of the hospital.  The front doors of the hospital open promptly at 5:30am.  If you need wheelchair assistance, call [phone] from your cell phone, or call "0" from the courtesy phone in the lobby.    IMPORTANT INSTRUCTIONS:  Do not eat or drink anything after midnight.  This includes water and coffee.  It is okay to brush your teeth.  Do not chew gum or have hard candy or mints.    Take your morning medications as instructed with small sips of water.     Do not take any diabetic medication.  Metformin or Synjardy should be held for 2 days before the angiogram.    Shower the night before your procedure and the morning of your procedure with Hibiclens as directed.     Do not wear make-up.     You may wear deodorant, but do not wear body lotion, powder or perfume/cologne       Do not wear jewelry.     You will be asked to remove any dentures or partials for the procedure.     You do not need money or valuables.  You may bring your cell phone.     If you are going home the same day, you must arrange for someone to drive you home.     Wear comfortable, loose fitting clothes.      Call your doctor if you have sickness or fever before your angiogram.     Our number in [facility] is [phone] if you need to contact us.     If you are going home the same day, you must arrange for a family member or a friend to drive you home.  Public transportation is prohibited.  "

## 2020-10-05 ENCOUNTER — PATIENT MESSAGE (OUTPATIENT)
Dept: ADMINISTRATIVE | Facility: HOSPITAL | Age: 60
End: 2020-10-05

## 2020-10-05 ENCOUNTER — HOSPITAL ENCOUNTER (OUTPATIENT)
Dept: PREADMISSION TESTING | Facility: HOSPITAL | Age: 60
Discharge: HOME | End: 2020-10-05
Attending: SURGERY
Payer: MEDICAID

## 2020-10-05 DIAGNOSIS — Z01.818 PREOP TESTING: ICD-10-CM

## 2020-10-05 LAB — SARS-COV-2 RDRP RESP QL NAA+PROBE: NEGATIVE

## 2020-10-05 PROCEDURE — U0002 COVID-19 LAB TEST NON-CDC: HCPCS

## 2020-10-06 ENCOUNTER — HOSPITAL ENCOUNTER (OUTPATIENT)
Facility: HOSPITAL | Age: 60
Discharge: HOME | End: 2020-10-06
Attending: SURGERY | Admitting: SURGERY
Payer: MEDICAID

## 2020-10-06 VITALS
HEIGHT: 70 IN | WEIGHT: 194 LBS | BODY MASS INDEX: 27.77 KG/M2 | DIASTOLIC BLOOD PRESSURE: 56 MMHG | HEART RATE: 76 BPM | OXYGEN SATURATION: 99 % | SYSTOLIC BLOOD PRESSURE: 108 MMHG | TEMPERATURE: 98 F | RESPIRATION RATE: 20 BRPM

## 2020-10-06 DIAGNOSIS — I73.9 PVD (PERIPHERAL VASCULAR DISEASE): ICD-10-CM

## 2020-10-06 DIAGNOSIS — I70.244 ATHEROSCLEROSIS OF NATIVE ARTERY OF LEFT LOWER EXTREMITY WITH ULCERATION OF MIDFOOT: ICD-10-CM

## 2020-10-06 DIAGNOSIS — I70.239 ATHEROSCLEROSIS OF RIGHT LOWER EXTREMITY WITH ULCERATION: Primary | ICD-10-CM

## 2020-10-06 DIAGNOSIS — Z01.818 PREOP TESTING: ICD-10-CM

## 2020-10-06 DIAGNOSIS — I70.233 ATHEROSCLEROSIS OF NATIVE ARTERY OF RIGHT LOWER EXTREMITY WITH ULCERATION OF ANKLE: ICD-10-CM

## 2020-10-06 LAB — POCT GLUCOSE: 93 MG/DL (ref 70–110)

## 2020-10-06 PROCEDURE — 75710 ARTERY X-RAYS ARM/LEG: CPT | Mod: 26,59,, | Performed by: SURGERY

## 2020-10-06 PROCEDURE — 37224 HC FEM/POPL REVAS W/TLA: CPT | Performed by: SURGERY

## 2020-10-06 PROCEDURE — 75710 ARTERY X-RAYS ARM/LEG: CPT | Performed by: SURGERY

## 2020-10-06 PROCEDURE — 99152 PR MOD CONSCIOUS SEDATION, SAME PHYS, 5+ YRS, FIRST 15 MIN: ICD-10-PCS | Mod: ,,, | Performed by: SURGERY

## 2020-10-06 PROCEDURE — 99152 MOD SED SAME PHYS/QHP 5/>YRS: CPT | Mod: ,,, | Performed by: SURGERY

## 2020-10-06 PROCEDURE — 37224 PR FEM/POPL REVAS W/TLA: CPT | Mod: LT,,, | Performed by: SURGERY

## 2020-10-06 PROCEDURE — 37224 PR FEM/POPL REVAS W/TLA: ICD-10-PCS | Mod: LT,,, | Performed by: SURGERY

## 2020-10-06 PROCEDURE — 25000003 PHARM REV CODE 250: Performed by: SURGERY

## 2020-10-06 PROCEDURE — 99153 MOD SED SAME PHYS/QHP EA: CPT | Performed by: SURGERY

## 2020-10-06 PROCEDURE — 99152 MOD SED SAME PHYS/QHP 5/>YRS: CPT | Performed by: SURGERY

## 2020-10-06 PROCEDURE — 75710 PR  ANGIO EXTREMITY UNILAT: ICD-10-PCS | Mod: 26,59,, | Performed by: SURGERY

## 2020-10-06 PROCEDURE — 63600175 PHARM REV CODE 636 W HCPCS: Performed by: SURGERY

## 2020-10-06 RX ORDER — PROTAMINE SULFATE 10 MG/ML
INJECTION, SOLUTION INTRAVENOUS CODE/TRAUMA/SEDATION MEDICATION
Status: COMPLETED | OUTPATIENT
Start: 2020-10-06 | End: 2020-10-06

## 2020-10-06 RX ORDER — MIDAZOLAM HYDROCHLORIDE 1 MG/ML
INJECTION INTRAMUSCULAR; INTRAVENOUS CODE/TRAUMA/SEDATION MEDICATION
Status: COMPLETED | OUTPATIENT
Start: 2020-10-06 | End: 2020-10-06

## 2020-10-06 RX ORDER — SODIUM CHLORIDE 9 MG/ML
INJECTION, SOLUTION INTRAVENOUS CONTINUOUS
Status: DISCONTINUED | OUTPATIENT
Start: 2020-10-06 | End: 2020-10-06 | Stop reason: HOSPADM

## 2020-10-06 RX ORDER — HEPARIN SODIUM 1000 [USP'U]/ML
INJECTION, SOLUTION INTRAVENOUS; SUBCUTANEOUS CODE/TRAUMA/SEDATION MEDICATION
Status: COMPLETED | OUTPATIENT
Start: 2020-10-06 | End: 2020-10-06

## 2020-10-06 RX ORDER — CLINDAMYCIN PHOSPHATE 900 MG/50ML
900 INJECTION, SOLUTION INTRAVENOUS
Status: COMPLETED | OUTPATIENT
Start: 2020-10-06 | End: 2020-10-06

## 2020-10-06 RX ORDER — ALLOPURINOL 300 MG/1
300 TABLET ORAL DAILY
Qty: 30 TABLET | Refills: 3 | Status: SHIPPED | OUTPATIENT
Start: 2020-10-06 | End: 2021-03-18 | Stop reason: SDUPTHER

## 2020-10-06 RX ORDER — FENTANYL CITRATE 50 UG/ML
INJECTION, SOLUTION INTRAMUSCULAR; INTRAVENOUS CODE/TRAUMA/SEDATION MEDICATION
Status: COMPLETED | OUTPATIENT
Start: 2020-10-06 | End: 2020-10-06

## 2020-10-06 RX ADMIN — SODIUM CHLORIDE: 0.9 INJECTION, SOLUTION INTRAVENOUS at 08:10

## 2020-10-06 RX ADMIN — PROTAMINE SULFATE 5 MG: 10 INJECTION, SOLUTION INTRAVENOUS at 11:10

## 2020-10-06 RX ADMIN — FENTANYL CITRATE 25 MCG: 50 INJECTION INTRAMUSCULAR; INTRAVENOUS at 11:10

## 2020-10-06 RX ADMIN — HEPARIN SODIUM 8000 UNITS: 1000 INJECTION, SOLUTION INTRAVENOUS; SUBCUTANEOUS at 11:10

## 2020-10-06 RX ADMIN — FENTANYL CITRATE 50 MCG: 50 INJECTION INTRAMUSCULAR; INTRAVENOUS at 10:10

## 2020-10-06 RX ADMIN — CLINDAMYCIN IN 5 PERCENT DEXTROSE 900 MG: 18 INJECTION, SOLUTION INTRAVENOUS at 10:10

## 2020-10-06 RX ADMIN — MIDAZOLAM HYDROCHLORIDE 0.5 MG: 1 INJECTION, SOLUTION INTRAMUSCULAR; INTRAVENOUS at 10:10

## 2020-10-06 RX ADMIN — HEPARIN SODIUM 2000 UNITS: 1000 INJECTION, SOLUTION INTRAVENOUS; SUBCUTANEOUS at 10:10

## 2020-10-06 NOTE — OR NURSING
Noted black scab to right heel and  Medial foot no drainage noted. Healing wound to the right 3rd toe. Noted  bruises to bilateral arms. flaky skin to bilateral legs.

## 2020-10-06 NOTE — OP NOTE
Date:10/6/20     Surgeon: Mitch Chan MD RPVI     Assistant: None     Pre-op Diagnosis:   1. Right lower extremity atherosclerosis with ulceration of foot  2. HTN  3. HLD  4. DM  5.   Patient Active Problem List   Diagnosis    Epiretinal membrane, both eyes    Nuclear sclerotic cataract of right eye    Pseudophakia, left eye    Ptosis, left eyelid    DM type 2 with diabetic peripheral neuropathy    HLD (hyperlipidemia)    Neovascular glaucoma of both eyes    Tophaceous gout    Essential hypertension    Coronary artery disease involving native coronary artery of native heart without angina pectoris    Neovascular glaucoma of left eye, severe stage    Statin myopathy    Neovascular glaucoma, right eye, mild stage    PVD (peripheral vascular disease)    Right wrist pain    Hepatosplenomegaly    Type 2 diabetes mellitus with left diabetic foot ulcer    Other ascites    MDR Acinetobacter baumannii infection    Debility    Subacute osteomyelitis of left foot    Stage 3 chronic kidney disease    Atherosclerosis of left lower extremity with ulceration of midfoot    CKD stage 3 due to type 2 diabetes mellitus    Anemia due to multiple mechanisms    Persistent proteinuria    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    Ischemic cardiomyopathy    Status post abdominal paracentesis    Diabetic ulcer of right lower leg    Diabetic ulcer of toe of right foot associated with type 2 diabetes mellitus, with bone involvement without evidence of necrosis    Diabetes mellitus due to underlying condition with foot ulcer, without long-term current use of insulin    Type 2 diabetes mellitus with right diabetic foot ulcer          Post-op Diagnosis: Same     Procedures:   1. US guided L CFA percutaneous access  2. Aortogram with CO2  3. RLE angiogram with CO2  4. R SFA angioplasty with a 5x60 mm Ultraverse balloon   5. Moderate sedation     Anesthesia: Local     EBL: Minimal     Findings:    Successful revascularization / resolution of stenosis     Complications:  None; patient tolerated the procedure well.     Disposition: Recovery- hemodynamically stable in good condition     Attending Attestation: I was present and scrubbed for the entire procedure.  After an informed consent was obtained the patient was brought to the interventional suite and placed in the supine position. Both the patient and procedure were confirmed and identified during timeout process. The patient received perioperative antibiotics. The patient's bilateral groins were prepped and draped in usual sterile fashion. Using ultrasound guidance, the left common femoral artery vessel patency was confirmed. Then direct ultrasound guidance the left CFA was entered with a 21-G needle, Mandril wire and 4-Fr micropuncture needle. Then under fluoroscopic guidance, an 0.035-in wire was placed into the distal aorta. The micropuncture dilator was then exchanged over the wire for a 6-Guyanese sheath. Sheathogram demonstrated access in the CFA. Next a VCF-catheter was placed over the wire and into the distal aorta under fluoroscopy and an aortogram with right leg angiogram was performed which demonstrated:    Aorto-iliac vessels:  No flow limiting stenosis  R Common femoral, profunda femoral arteries:   No flow limiting stenosis  R Superficial femoral artery:  Severe >90% proximal stenosis, mild to moderate arterial occlusive disease distally without flow limiting stenosis  R Popliteal artery:  No flow limiting stenosis  Tibials:  single vessel runoff via peroneal with extensive collateralization at ankle, AT occluded proximally with reconstitution distally filling DP and plantar arch, PT occluded in entirety without reconstitution    Based on the images from this diagnostic angio, a decision was made to intervene.    We then systemically heparinized the patient with 9000u of heparin.   Next, we placed a up-and-over sheath and used a 0.35 stiff  angled Glide wire was used to select the R SFA artery.   Treatment of the R SFA artery was done with the balloon listed above. A follow-up angiogram showed resolution of the lesions. Heparin was reversed with protamine. We then deployed 6fr Mynx closure device without issue. At the conclusion of the case the patient was noted to have no evidence of a groin hematoma. All instrument and sponge counts were correct at the end of the case. The patient tolerated the procedure well and was transferred to the pacu for further recovery.      MODERATE SEDATION   I was present for moderate sedation and monitored the patients cardio respiratory functions during the procedure. See nurses notes for Intra-service start and end times and for the log of the medications, doseage and route.     Time of sedation:  77 mins.

## 2020-10-06 NOTE — BRIEF OP NOTE
[facility]  Brief Operative Note    Surgery Date: 10/6/2020     Surgeon(s) and Role:     * Mitch Chan MD - Primary    Assisting Surgeon: None    Pre-op Diagnosis:  Atherosclerosis of native artery of right lower extremity with ulceration of ankle [I70.233]  Atherosclerosis of right lower extremity with ulceration [I70.239]    Post-op Diagnosis:  Post-Op Diagnosis Codes:     * Atherosclerosis of native artery of right lower extremity with ulceration of ankle [I70.233]     * Atherosclerosis of right lower extremity with ulceration [I70.239]    Procedure:  1. US guided L CFA percutaneous access  2. Aortogram with CO2  3. RLE angiogram with CO2  4. R SFA angioplasty with a 5x60 mm Ultraverse balloon   5. Moderate sedation    Anesthesia: RN IV Sedation    Description of the findings of the procedure(s): adequate artery for access    Estimated Blood Loss: <5 cc         Specimens:   Specimen (12h ago, onward)    None            Discharge Note    OUTCOME: Patient tolerated treatment/procedure well without complication and is now ready for discharge.    DISPOSITION: Home or Self Care    FINAL DIAGNOSIS:  <principal problem not specified>    FOLLOWUP: In clinic    DISCHARGE INSTRUCTIONS:  No discharge procedures on file.

## 2020-10-06 NOTE — OR NURSING
Noted black scab to right heel and  Medial foot no drainage noted. Healing wound to the right 3rd toe.

## 2020-10-06 NOTE — DISCHARGE INSTRUCTIONS
ANGIOGRAM INSTRUCTIONS                                           Drink plenty of fluids for the next 48 hours and follow your doctor's diet orders.    Rest for the next 72 hours.   Try not to keep the injected leg bent for a long period of time.  Remove the dressing in 24 hours, and you may shower. Clean the area with soap and water, and apply a band aid for the next 5 days.                                                                 No  Lifting over 5-10 lbs., that is, not more than 1 gallon of water, or straining for 72 hours.    No driving, no drinking alcohol, and no signing legal documents for the next 24 hours.    Call your doctor for elevated temperature, shortness of breath, chest pain, or cold discolored foot or leg.    If oozing occurs at the injections site, lie down.  Apply pressure with a clean wash cloth for 20 to 30 minutes and call your doctor.    If severe bleeding occurs, lie down, apply pressure.  Call 911 and request an ambulance to take you to the nearest hospital emergency room.    Continue to take your regular medications as instructed.      Follow the instructions in the handout given to you.     Fall Prevention  Millions of people fall every year and injure themselves. You may have had anesthesia or sedation which may increase your risk of falling. You may have health issues that put you at an increased risk of falling.     Here are ways to reduce your risk of falling.  ·   · Make your home safe by keeping walkways clear of objects you may trip over.  · Use non-slip pads under rugs. Do not use area rugs or small throw rugs.  · Use non-slip mats in bathtubs and showers.  · Install handrails and lights on staircases.  · Do not walk in poorly lit areas.  · Do not stand on chairs or wobbly ladders.  · Use caution when reaching overhead or looking upward. This position can cause a loss of balance.  · Be sure your shoes fit properly, have non-slip bottoms and are in good  condition.   · Wear shoes both inside and out. Avoid going barefoot or wearing slippers.  · Be cautious when going up and down stairs, curbs, and when walking on uneven sidewalks.  · If your balance is poor, consider using a cane or walker.  · If your fall was related to alcohol use, stop or limit alcohol intake.   · If your fall was related to use of sleeping medicines, talk to your doctor about this. You may need to reduce your dosage at bedtime if you awaken during the night to go to the bathroom.    · To reduce the need for nighttime bathroom trips:  ¨ Avoid drinking fluids for several hours before going to bed  ¨ Empty your bladder before going to bed  ¨ Men can keep a urinal at the bedside  · Stay as active as you can. Balance, flexibility, strength, and endurance all come from exercise. They all play a role in preventing falls. Ask your healthcare provider which types of activity are right for you.  · Get your vision checked on a regular basis.  · If you have pets, know where they are before you stand up or walk so you don't trip over them.  · Use night lights.

## 2020-10-06 NOTE — INTERVAL H&P NOTE
The patient has been examined and the H&P has been reviewed:    I concur with the findings and no changes have occurred since H&P was written.    Surgery risks, benefits and alternative options discussed and understood by patient/family.          Active Hospital Problems    Diagnosis  POA    Atherosclerosis of right lower extremity with ulceration [I70.239]  Yes      Resolved Hospital Problems   No resolved problems to display.

## 2020-10-08 DIAGNOSIS — R18.8 OTHER ASCITES: Primary | ICD-10-CM

## 2020-10-12 ENCOUNTER — HOSPITAL ENCOUNTER (OUTPATIENT)
Dept: INTERVENTIONAL RADIOLOGY/VASCULAR | Facility: HOSPITAL | Age: 60
Discharge: HOME | End: 2020-10-12
Attending: FAMILY MEDICINE
Payer: MEDICAID

## 2020-10-12 VITALS — DIASTOLIC BLOOD PRESSURE: 64 MMHG | SYSTOLIC BLOOD PRESSURE: 116 MMHG

## 2020-10-12 DIAGNOSIS — R18.8 OTHER ASCITES: ICD-10-CM

## 2020-10-12 PROCEDURE — 49083 IR PARACENTESIS NO IMAGING: ICD-10-PCS | Mod: ,,, | Performed by: RADIOLOGY

## 2020-10-12 PROCEDURE — 49083 ABD PARACENTESIS W/IMAGING: CPT | Mod: ,,, | Performed by: RADIOLOGY

## 2020-10-12 PROCEDURE — A7048 VACUUM DRAIN BOTTLE/TUBE KIT: HCPCS

## 2020-10-12 PROCEDURE — 49083 ABD PARACENTESIS W/IMAGING: CPT

## 2020-10-12 NOTE — BRIEF OP NOTE
Radiology Post-Procedure Note     Pre Op Diagnosis: Recurrent painful, tense ascites  Post Op Diagnosis: Same     Procedure: U/S-guided therapeutic LVP     Procedure performed by: Zach Cha MD     Written Informed Consent Obtained: Yes  Specimen Removed: YES, 5000-cc of thin, straw-colored ascitic fluid  Estimated Blood Loss: none     Findings:   1. Successful therapeutic large-volume paracentesis with local anesthetic only and albumin infusion post PRN as indicated per institutional protocol. Patient tolerated the procedure well. No immediate post-procedural complications noted.       Thank you for considering IR for the care of your patient.      Zach Cha MD  Interventional Radiology

## 2020-10-12 NOTE — DISCHARGE SUMMARY
Radiology Discharge Summary      Hospital Course: No complications    Admit Date: 10/12/2020  Discharge Date: 10/12/2020     Instructions Given to Patient: Yes    Diet: Resume prior diet     Activity: activity as tolerated    Description of Condition on Discharge: Stable    Vital Signs (Most Recent): BP: 116/64 (10/12/20 1123)    Discharge Disposition: Home    Discharge Diagnosis:   59 y/o M with h/o recurrent painful, tense ascites s/p successful U/S-guided therapeutic LVP with local anesthetic only and albumin infusion post PRN as indicated per institutional protocol. Patient tolerated the procedure well. No immediate post-procedural complications noted.      Thank you for considering IR for the care of your patient.      Zach Cha MD  Interventional Radiology

## 2020-10-12 NOTE — H&P
Radiology History & Physical      SUBJECTIVE:     Chief Complaint: Recurrent painful, tense ascites     History of Present Illness:  Marvin Ray is a 60 y.o. male with PMHx of CKD III and combined systolic/diastolic CHF complicated by recurrent abdominal distension and pain 2/2 recurrent painful, tense ascites without TTP on PE to suggest SBP requiring frequent  therapeutic LVP.      A new outpatient IR consult placed for US-guided therapeutic large-volume paracentesis.    Past Medical History:   Diagnosis Date    Arthritis     Cellulitis     CKD (chronic kidney disease), stage III     Coronary artery disease     Diabetes mellitus     Diabetic retinopathy     Diabetic ulcer of left foot     Glaucoma     Gout     Hyperlipemia     Hypertension     ICD (implantable cardioverter-defibrillator) in place 11/02/2018    Left chest    Non-pressure chronic ulcer of other part of left foot with fat layer exposed 10/23/2018    PVD (peripheral vascular disease)     Type 2 diabetes mellitus with left diabetic foot ulcer 10/29/2018    Unsteady gait     uses a wheelchair     Past Surgical History:   Procedure Laterality Date    AHMED GLAUCOMA IMPLANT Left 2011    DONE AT [facility]    AORTOGRAPHY WITH SERIALOGRAPHY Left 4/30/2019    Procedure: AORTOGRAM, WITH SERIALOGRAPHY, LEFT LOWER EXTREMITY, POSSIBLE INTERVENTION;  Surgeon: Mitch Chan MD;  Location: [facility] OR;  Service: Vascular;  Laterality: Left;  RN PRE OP 4-23-19.  NO H & P, No Cosent  [place]    AORTOGRAPHY WITH SERIALOGRAPHY Right 10/6/2020    Procedure: AORTOGRAM, WITH SERIALOGRAPHY, RIGHT LOWER EXTREMITY ANGIOGRAM, POSSIBLE INTERVENTION;  Surgeon: Mitch Chan MD;  Location: [facility] OR;  Service: Vascular;  Laterality: Right;  RN PREOP 10/1/2020--COVID NEGATIVE ON 10/5    BAERVELDT GLAUCOMA IMPLANT Left 2012    WITH CATARACT EXTRACTION//DONE AT [facility]    CARDIAC CATHETERIZATION Left 05/2016    CARDIAC DEFIBRILLATOR PLACEMENT Left  11/02/2018    CATARACT EXTRACTION W/  INTRAOCULAR LENS IMPLANT Left 2012    WITH BAERVEDT//DONE AT [facility]    CATARACT EXTRACTION W/  INTRAOCULAR LENS IMPLANT Right 09/26/2018    COMPLEX ()    CB DESTRUCTION WITH CYCLO G6 Left 02/15/2017        CYST REMOVAL      DEBRIDEMENT OF FOOT  12/28/2018    Procedure: DEBRIDEMENT, FOOT;  Surgeon: Mitch Wall MD;  Location: [facility] OR;  Service: Vascular;;    DEBRIDEMENT OF FOOT  6/5/2019    Procedure: DEBRIDEMENT, FOOT;  Surgeon: Mitch Wall MD;  Location: [facility] OR;  Service: Vascular;;    EXAMINATION UNDER ANESTHESIA Left 12/5/2018    Procedure: Exam under anesthesia, left foot debridement, washout and all other indicated procedures;  Surgeon: Mitch Wall MD;  Location: [facility] OR;  Service: Vascular;  Laterality: Left;  1030AM START  RN PREOP 12/3/2018-----AICD  SEEN BY DR JAMES 12/4    EXAMINATION UNDER ANESTHESIA Left 2/13/2019    Procedure: LEFT FOOT WOUND DEBRIDEMENT, WASHOUT AND ALL OTHER INDICATED PROCEDURES;  Surgeon: Mitch Wall MD;  Location: [facility] OR;  Service: Vascular;  Laterality: Left;  requesting 1st case start  THIS CASE 1ST PER DR WALL ON 2/1/19 @ 844AM -LO  RN PREOP 2/6/2019  HAS CARDIAC CLEARANCE    EXAMINATION UNDER ANESTHESIA Left 12/28/2018    Procedure: Exam under anesthesia, left foot debridement, left foot washout, possible left second through fifth metatarsal resection;  Surgeon: Mitch Wall MD;  Location: [facility] OR;  Service: Vascular;  Laterality: Left;  1:00pm start per julissa @ 8:58am  RN  PHONE PRE OP 12-27-18--=Pt coming from [facility] on [address]  NEED CONSENT    EXAMINATION UNDER ANESTHESIA Left 6/5/2019    Procedure: LEFT FOOT DEBRIDEMENT, WASHOUT AND ALL OTHER INDICATED PROCEDURES;  Surgeon: Mitch Wall MD;  Location: [facility] OR;  Service: Vascular;  Laterality: Left;  RN PRE OP 5-31-19------ICD------NEED CONSENT    INCISION AND DRAINAGE Left 11/14/2018     Procedure: Incision and Drainage, left lower extremity debridement, washout;  Surgeon: Mitch Chan MD;  Location: [facility] OR;  Service: Vascular;  Laterality: Left;    INCISION AND DRAINAGE Left 11/16/2018    Procedure: INCISION AND DRAINAGE;  Surgeon: Mitch Chan MD;  Location: [facility] OR;  Service: Vascular;  Laterality: Left;    INTRAOCULAR PROSTHESES INSERTION Right 9/26/2018    Procedure: INSERTION, IOL PROSTHESIS;  Surgeon: Perla Cortés MD;  Location: [facility] OR [address] [place] [address];  Service: Ophthalmology;  Laterality: Right;    PERITONEOCENTESIS N/A 3/1/2019    Procedure: PARACENTESIS, ABDOMINAL, INITIAL;  Surgeon: Dosc Diagnostic Provider;  Location: [facility] OR;  Service: Radiology;  Laterality: N/A;    PERITONEOCENTESIS N/A 3/25/2019    Procedure: PARACENTESIS, ABDOMINAL, INITIAL;  Surgeon: Dosc Diagnostic Provider;  Location: [facility] OR;  Service: Radiology;  Laterality: N/A;    PERITONEOCENTESIS N/A 7/22/2019    Procedure: PARACENTESIS, ABDOMINAL, INITIAL;  Surgeon: Dosc Diagnostic Provider;  Location: [facility] OR;  Service: Radiology;  Laterality: N/A;    PERITONEOCENTESIS N/A 8/16/2019    Procedure: PARACENTESIS, ABDOMINAL, INITIAL;  Surgeon: Dosc Diagnostic Provider;  Location: [facility] OR;  Service: Radiology;  Laterality: N/A;    PERITONEOCENTESIS N/A 9/26/2019    Procedure: PARACENTESIS, ABDOMINAL;  Surgeon: Dosc Diagnostic Provider;  Location: [facility] OR;  Service: Radiology;  Laterality: N/A;    PERITONEOCENTESIS N/A 10/11/2019    Procedure: PARACENTESIS, ABDOMINAL;  Surgeon: Dosc Diagnostic Provider;  Location: [facility] OR;  Service: Radiology;  Laterality: N/A;    PERITONEOCENTESIS N/A 10/31/2019    Procedure: PARACENTESIS, ABDOMINAL;  Surgeon: Dosc Diagnostic Provider;  Location: [facility] OR;  Service: Radiology;  Laterality: N/A;    PERITONEOCENTESIS N/A 11/18/2019    Procedure: PARACENTESIS, ABDOMINAL;  Surgeon: Dosc Diagnostic Provider;  Location: [facility] OR;  Service: Radiology;  Laterality: N/A;     PERITONEOCENTESIS N/A 12/16/2019    Procedure: PARACENTESIS, ABDOMINAL;  Surgeon: Dosc Diagnostic Provider;  Location: [facility] OR;  Service: Radiology;  Laterality: N/A;  2PM START    PERITONEOCENTESIS N/A 2/7/2020    Procedure: PARACENTESIS, ABDOMINAL;  Surgeon: Dosc Diagnostic Provider;  Location: [facility] OR;  Service: Radiology;  Laterality: N/A;  REQUESTED 10:30A START    PERITONEOCENTESIS N/A 2/19/2020    Procedure: PARACENTESIS, ABDOMINAL;  Surgeon: Dosc Diagnostic Provider;  Location: [facility] OR;  Service: Radiology;  Laterality: N/A;    PERITONEOCENTESIS N/A 2/28/2020    Procedure: PARACENTESIS, ABDOMINAL;  Surgeon: Dosc Diagnostic Provider;  Location: [facility] OR;  Service: Radiology;  Laterality: N/A;  REQUESTED 10AM START    PHACOEMULSIFICATION OF CATARACT Right 9/26/2018    Procedure: PHACOEMULSIFICATION, CATARACT;  Surgeon: Perla Cortés MD;  Location: [address];  Service: Ophthalmology;  Laterality: Right;    REPEAT ABDOMINAL PARACENTESIS N/A 2/11/2019    Procedure: PARACENTESIS, ABDOMINAL, REPEAT;  Surgeon: Dosc Diagnostic Provider;  Location: [facility] OR;  Service: Radiology;  Laterality: N/A;  1PM START    REPEAT ABDOMINAL PARACENTESIS N/A 9/12/2019    Procedure: PARACENTESIS, ABDOMINAL, REPEAT;  Surgeon: Dosc Diagnostic Provider;  Location: [facility] OR;  Service: Radiology;  Laterality: N/A;  9AM START    REPEAT ABDOMINAL PARACENTESIS N/A 12/2/2019    Procedure: PARACENTESIS, ABDOMINAL, REPEAT;  Surgeon: Dosc Diagnostic Provider;  Location: [facility] OR;  Service: Radiology;  Laterality: N/A;  3PM START    REPEAT ABDOMINAL PARACENTESIS N/A 1/10/2020    Procedure: PARACENTESIS, ABDOMINAL, REPEAT;  Surgeon: Dosc Diagnostic Provider;  Location: [facility] OR;  Service: Radiology;  Laterality: N/A;  1130AM START    REPEAT ABDOMINAL PARACENTESIS N/A 1/20/2020    Procedure: PARACENTESIS, ABDOMINAL, REPEAT;  Surgeon: Dosc Diagnostic Provider;  Location: [facility] OR;  Service: Radiology;  Laterality: N/A;  1PM START     REPEAT ABDOMINAL PARACENTESIS N/A 1/31/2020    Procedure: PARACENTESIS, ABDOMINAL, REPEAT;  Surgeon: Dosc Diagnostic Provider;  Location: [facility] OR;  Service: Radiology;  Laterality: N/A;  10AM START    REPEAT ABDOMINAL PARACENTESIS N/A 3/16/2020    Procedure: PARACENTESIS, ABDOMINAL, REPEAT;  Surgeon: Dosc Diagnostic Provider;  Location: [facility] OR;  Service: Radiology;  Laterality: N/A;  10AM START    REPEAT ABDOMINAL PARACENTESIS N/A 3/30/2020    Procedure: PARACENTESIS, ABDOMINAL, REPEAT;  Surgeon: Dosc Diagnostic Provider;  Location: [facility] OR;  Service: Radiology;  Laterality: N/A;    REPEAT ABDOMINAL PARACENTESIS N/A 4/9/2020    Procedure: PARACENTESIS, ABDOMINAL, REPEAT;  Surgeon: Dos Diagnostic Provider;  Location: [facility] OR;  Service: Radiology;  Laterality: N/A;  1130AM START    REPEAT ABDOMINAL PARACENTESIS N/A 5/8/2020    Procedure: PARACENTESIS, ABDOMINAL, REPEAT;  Surgeon: Dosc Diagnostic Provider;  Location: [facility] OR;  Service: Radiology;  Laterality: N/A;  9AM START    REPEAT ABDOMINAL PARACENTESIS N/A 5/21/2020    Procedure: PARACENTESIS, ABDOMINAL, REPEAT;  Surgeon: Dos Diagnostic Provider;  Location: [facility] OR;  Service: Radiology;  Laterality: N/A;  10AM START    REPEAT ABDOMINAL PARACENTESIS N/A 6/5/2020    Procedure: PARACENTESIS, ABDOMINAL, REPEAT;  Surgeon: Dosc Diagnostic Provider;  Location: [facility] OR;  Service: Radiology;  Laterality: N/A;  NOON START    REPEAT ABDOMINAL PARACENTESIS N/A 7/10/2020    Procedure: PARACENTESIS, ABDOMINAL, REPEAT;  Surgeon: Dosc Diagnostic Provider;  Location: [facility] OR;  Service: Radiology;  Laterality: N/A;  1PM START    REPEAT ABDOMINAL PARACENTESIS N/A 8/20/2020    Procedure: PARACENTESIS, ABDOMINAL, REPEAT;  Surgeon: Dosc Diagnostic Provider;  Location: [facility] OR;  Service: Radiology;  Laterality: N/A;  1PM START    REPEAT ABDOMINAL PARACENTESIS N/A 9/4/2020    Procedure: PARACENTESIS, ABDOMINAL, REPEAT;  Surgeon: Dosc Diagnostic Provider;  Location: [facility] OR;   Service: Radiology;  Laterality: N/A;  11 AM START    REPEAT ABDOMINAL PARACENTESIS N/A 9/16/2020    Procedure: PARACENTESIS, ABDOMINAL, REPEAT;  Surgeon: [facility] Diagnostic Provider;  Location: [facility] OR;  Service: Radiology;  Laterality: N/A;  START 2:00 P.M.    REPEAT ABDOMINAL PARACENTESIS N/A 9/28/2020    Procedure: PARACENTESIS, ABDOMINAL, REPEAT;  Surgeon: [facility] Diagnostic Provider;  Location: [facility] OR;  Service: Radiology;  Laterality: N/A;  1 P.M. START    REVISION OF PROCEDURE INVOLVING GLAUCOMA DRAINAGE DEVICE Left 10/2/2019    EXTRUDING BAERVELDT /WITH PERICARDIAL PATCH GRAFT ()    Right foot surgery  10/2014    TOE AMPUTATION Right     first and second    TOE AMPUTATION Left 11/16/2018    Procedure: AMPUTATION, TOES  2-5;  Surgeon: Mitch Chan MD;  Location: [facility] OR;  Service: Vascular;  Laterality: Left;    TOE AMPUTATION Left 12/5/2018    Procedure: AMPUTATION, TOE;  Surgeon: Mitch Chan MD;  Location: [facility] OR;  Service: Vascular;  Laterality: Left;  left great toe    TONSILLECTOMY       Home Meds:   Prior to Admission medications    Medication Sig Start Date End Date Taking? Authorizing Provider   allopurinoL (ZYLOPRIM) 300 MG tablet Take 1 tablet (300 mg total) by mouth once daily. 10/6/20   Rubén Davis MD   aspirin (ECOTRIN) 81 MG EC tablet Take 81 mg by mouth once daily.    Historical Provider   bisacodyl (DULCOLAX) 5 mg EC tablet Take 5 mg by mouth daily as needed for Constipation.    Historical Provider   brimonidine 0.2% (ALPHAGAN) 0.2 % Drop Place 1 drop into both eyes 3 (three) times daily. 12/12/19   Perla Cortés MD   carvediloL (COREG) 3.125 MG tablet TAKE 1 TABLET(3.125 MG) BY MOUTH TWICE DAILY 8/17/20   Rubén Davis MD   coenzyme Q10 100 mg capsule Take 100 mg by mouth every morning.    Historical Provider   dorzolamide-timolol 2-0.5% (COSOPT) 22.3-6.8 mg/mL ophthalmic solution Place 1 drop into both eyes 3 (three) times daily. 12/12/19    "Perla Cortés MD   ezetimibe (ZETIA) 10 mg tablet TAKE 1 TABLET(10 MG) BY MOUTH EVERY DAY 7/16/20   Rubén Davis MD   fish oil-omega-3 fatty acids 300-1,000 mg capsule Take 1 capsule by mouth 2 (two) times daily. 1/23/19   Sara Ching MD   gabapentin (NEURONTIN) 100 MG capsule TAKE 2 CAPSULES(200 MG) BY MOUTH EVERY EVENING 9/21/20   Rubén Davis MD   insulin (LANTUS SOLOSTAR U-100 INSULIN) glargine 100 units/mL (3mL) SubQ pen Inject 35 units once daily 9/11/20   Sheryl Madison NP   insulin degludec-liraglutide (XULTOPHY 100/3.6) 100 unit-3.6 mg /mL (3 mL) InPn Inject 35 Units into the skin once daily.    Historical Provider   liraglutide 0.6 mg/0.1 mL, 18 mg/3 mL, subq PNIJ (VICTOZA 3-MEMO) 0.6 mg/0.1 mL (18 mg/3 mL) PnIj pen Inject 1.8 mg into the skin once daily. 9/11/20 9/11/21  Sheryl Madison NP   MULTIVIT,THER IRON,CA,FA & MIN (MULTIVITAMIN) Tab Take 1 tablet by mouth every morning.     Historical Provider   pen needle, diabetic (BD ULTRA-FINE AMADOR PEN NEEDLE) 32 gauge x 5/32" Ndle 1 Device by Misc.(Non-Drug; Combo Route) route 2 (two) times a day. 9/11/20   hSeryl Madison NP   torsemide (DEMADEX) 20 MG Tab Take 4 tablets (80 mg total) by mouth 2 (two) times daily. 6/16/20 10/6/20  Rubén Davis MD     Anticoagulants/Antiplatelets: no anticoagulation    Allergies:   Review of patient's allergies indicates:   Allergen Reactions    Statins-hmg-coa reductase inhibitors      Generalized Pain    Onglyza [saxagliptin]     Penicillins Rash     Sedation History:  no adverse reactions     Review of Systems:   Hematological: no known coagulopathies  Respiratory: positive for - orthopnea and shortness of breath  Cardiovascular: positive for - dyspnea on exertion, orthopnea and shortness of breath  Gastrointestinal: positive for - abdominal pain and distension  Genito-Urinary: no dysuria, trouble voiding, or hematuria  Musculoskeletal: negative  Neurological: no TIA or stroke symptoms      OBJECTIVE: "     Vital Signs (Most Recent)     Physical Exam:  General: no acute distress  Mental Status: alert and oriented to person, place and time  HEENT: normocephalic, atraumatic  Chest: mild labored breathing  Heart: regular heart rate  Abdomen: +distended. No TTP/r/g.  Extremity: moves all extremities    Laboratory  Lab Results   Component Value Date    INR 1.1 05/31/2019       Lab Results   Component Value Date    WBC 8.20 09/29/2020    HGB 12.3 (L) 09/29/2020    HCT 39.8 (L) 09/29/2020    MCV 92 09/29/2020     09/29/2020      Lab Results   Component Value Date     (H) 09/29/2020     09/29/2020    K 3.8 09/29/2020     09/29/2020    CO2 24 09/29/2020    BUN 69 (H) 09/29/2020    CREATININE 1.5 (H) 09/29/2020    CALCIUM 8.3 (L) 09/29/2020    MG 2.1 05/31/2019    ALT 13 09/29/2020    AST 19 09/29/2020    ALBUMIN 2.5 (L) 09/29/2020    BILITOT 0.6 09/29/2020     ASSESSMENT/PLAN:     61 y/o M with CKD III and combined systolic/diastolic CHF complicated by recurrent abdominal distension and pain 2/2 recurrent painful, tense ascites without TTP on PE to suggest SBP, requiring frequent large-volume therapeutic paracentesis.      1. Recurrent painful, tense ascites - Will attempt U/S-guided therapeutic paracentesis with local anesthetic only and albumin infusion post PRN per institutional protocol.      Risks (including, but not limited to, pain, bleeding, infection, damage to nearby structures, failure to obtain sufficient material for a diagnosis, the need for additional procedures, and death), benefits, and alternatives were discussed with the patient. All questions were answered to the best of my abilities. The patient wishes to proceed with the procedure. Written informed consent was obtained.     Thank you for considering IR for the care of your patient.      Zach Cha MD  Interventional Radiology

## 2020-10-13 ENCOUNTER — HOSPITAL ENCOUNTER (OUTPATIENT)
Dept: WOUND CARE | Facility: HOSPITAL | Age: 60
Discharge: HOME | End: 2020-10-13
Attending: FAMILY MEDICINE
Payer: MEDICAID

## 2020-10-13 VITALS — HEART RATE: 73 BPM | SYSTOLIC BLOOD PRESSURE: 100 MMHG | TEMPERATURE: 98 F | DIASTOLIC BLOOD PRESSURE: 52 MMHG

## 2020-10-13 DIAGNOSIS — E11.621 TYPE 2 DIABETES MELLITUS WITH LEFT DIABETIC FOOT ULCER: Primary | ICD-10-CM

## 2020-10-13 DIAGNOSIS — E11.621 TYPE 2 DIABETES MELLITUS WITH RIGHT DIABETIC FOOT ULCER: ICD-10-CM

## 2020-10-13 DIAGNOSIS — L97.919 DIABETIC ULCER OF RIGHT LOWER LEG: ICD-10-CM

## 2020-10-13 DIAGNOSIS — L97.529 TYPE 2 DIABETES MELLITUS WITH LEFT DIABETIC FOOT ULCER: Primary | ICD-10-CM

## 2020-10-13 DIAGNOSIS — I73.9 PVD (PERIPHERAL VASCULAR DISEASE): Chronic | ICD-10-CM

## 2020-10-13 DIAGNOSIS — L97.519 TYPE 2 DIABETES MELLITUS WITH RIGHT DIABETIC FOOT ULCER: ICD-10-CM

## 2020-10-13 DIAGNOSIS — E11.622 DIABETIC ULCER OF RIGHT LOWER LEG: ICD-10-CM

## 2020-10-13 PROCEDURE — 11042 DEBRIDEMENT: ICD-10-PCS | Mod: ,,, | Performed by: FAMILY MEDICINE

## 2020-10-13 PROCEDURE — 99214 OFFICE O/P EST MOD 30 MIN: CPT | Mod: 25,,, | Performed by: FAMILY MEDICINE

## 2020-10-13 PROCEDURE — 11042 DBRDMT SUBQ TIS 1ST 20SQCM/<: CPT | Performed by: FAMILY MEDICINE

## 2020-10-13 PROCEDURE — 11045 DBRDMT SUBQ TISS EACH ADDL: CPT | Performed by: FAMILY MEDICINE

## 2020-10-13 PROCEDURE — 11045 DBRDMT SUBQ TISS EACH ADDL: CPT | Mod: ,,, | Performed by: FAMILY MEDICINE

## 2020-10-13 PROCEDURE — 11045 DEBRIDEMENT: ICD-10-PCS | Mod: ,,, | Performed by: FAMILY MEDICINE

## 2020-10-13 PROCEDURE — 27201912 HC WOUND CARE DEBRIDEMENT SUPPLIES: Performed by: FAMILY MEDICINE

## 2020-10-13 PROCEDURE — 11042 DBRDMT SUBQ TIS 1ST 20SQCM/<: CPT | Mod: ,,, | Performed by: FAMILY MEDICINE

## 2020-10-13 PROCEDURE — 99214 PR OFFICE/OUTPT VISIT, EST, LEVL IV, 30-39 MIN: ICD-10-PCS | Mod: 25,,, | Performed by: FAMILY MEDICINE

## 2020-10-13 NOTE — PROGRESS NOTES
[facility]                Progress Note    Subjective:       Patient ID: Marvin Ray is a 60 y.o. male.    Chief Complaint: No chief complaint on file.    Patient transferred from wheelchair to exam bed with assistance of sister. Patient had intervention with Vascular Surgeon, MD Chan last week. Concern over increased malodor to Right Medial Heel Wound which has necrotic tissue to majority of wound bed and thin line of green/yellow slough surrounding. Temperature cool at center of necrotic tissue at heel whereas remainder of Right Foot is warm to touch. Right 3rd toe wound appears to be filling in. New blistering area to Right Lateral Foot that developed 3 days ago, per patient account. New blisters also open to Right Anterior Lower Leg with serous drainage. Left TMA and Left Medial/Plantar Foot wounds appear smaller this week. Cleaned all wounds with vashe. New orders for Aquacel AG alginate/foam border to RLE leg blisters and betadine to Right medial heel with santyl/hydrofera blue to sloughy border. Recommending patient follow up with MD Chan for surgical debridement of Right Heel if blood flow confirmed to area. Continuing collagen to Left Foot wounds which appear to be healing - although serial debridements in wound clinic are still required. Patient mostly elevating at home. Not as much energy for doing the stuff he enjoys like gardening, but he does feel better overall since recent Vascular intervention.       Review of Systems   Constitutional: Negative.    HENT: Negative.    Eyes: Negative.    Respiratory: Negative.    Gastrointestinal: Positive for abdominal distention. Negative for abdominal pain and anal bleeding.   Skin: Positive for wound.   Neurological: Positive for numbness.   Psychiatric/Behavioral: Negative.          Objective:        Physical Exam  Constitutional:       Appearance: Normal appearance.   HENT:      Head: Normocephalic  "and atraumatic.      Right Ear: External ear normal.      Left Ear: External ear normal.      Nose: Nose normal.      Mouth/Throat:      Mouth: Mucous membranes are moist.      Pharynx: Oropharynx is clear.   Eyes:      Conjunctiva/sclera: Conjunctivae normal.      Pupils: Pupils are equal, round, and reactive to light.   Neck:      Musculoskeletal: Normal range of motion and neck supple.   Cardiovascular:      Rate and Rhythm: Normal rate and regular rhythm.   Pulmonary:      Effort: Pulmonary effort is normal. No respiratory distress.      Breath sounds: No wheezing.   Abdominal:      General: There is distension.      Palpations: Abdomen is soft.      Tenderness: There is no abdominal tenderness. There is no guarding.   Skin:     General: Skin is warm and dry.      Comments: +possible wet gangreen to right heel with right second toe improving    +DFU x 2 to left foot with excess slough requiring debridement   Neurological:      Mental Status: He is alert.           Vitals:    10/13/20 1018   BP: (!) 100/52   Pulse: 73   Temp: 97.5 °F (36.4 °C)     Debridement    Date/Time: 10/13/2020 1:49 PM  Performed by: Rubén Davis MD  Authorized by: Rubén Davis MD     Time out: Immediately prior to procedure a "time out" was called to verify the correct patient, procedure, equipment, support staff and site/side marked as required.    Consent Done?:  Yes (Written)  Local anesthesia used?: No      Wound Details:    Location:  Left foot    Location:  Left Dorsal Foot    Type of Debridement:  Excisional       Length (cm):  3       Area (sq cm):  19.5       Width (cm):  6.5       Percent Debrided (%):  100       Depth (cm):  0       Total Area Debrided (sq cm):  19.5    Depth of debridement:  Subcutaneous tissue    Tissue debrided:  Dermis, Epidermis and Subcutaneous    Devitalized tissue debrided:  Biofilm, Slough and Fibrin    Instruments:  Curette    Additional wounds:  1    2nd Wound Details:     Location:  Left " foot    Location:  Left Midfoot    Location:  Left Midfoot    Type of Debridement:  Excisional       Length (cm):  8.2       Area (sq cm):  59.86       Width (cm):  7.3       Percent Debrided (%):  80       Depth (cm):  0.1       Total Area Debrided (sq cm):  47.89    Depth of debridement:  Subcutaneous tissue    Tissue debrided:  Dermis, Epidermis and Subcutaneous    Devitalized tissue debrided:  Biofilm, Fibrin and Slough    Instruments:  Curette    Bleeding:  Minimal  Hemostasis Achieved: Yes    Method Used:  Pressure  Patient tolerance:  Patient tolerated the procedure well with no immediate complications      Assessment:           ICD-10-CM ICD-9-CM   1. Type 2 diabetes mellitus with left diabetic foot ulcer  E11.621 250.80    L97.529 707.15   2. Type 2 diabetes mellitus with right diabetic foot ulcer  E11.621 250.80    L97.519 707.15   3. Diabetic ulcer of right lower leg  E11.622 250.80    L97.919 707.10   4. PVD (peripheral vascular disease)  I73.9 443.9            Wound 11/02/18 Diabetic Ulcer Left medial Foot (Active)   11/02/18     Pre-existing: Yes   Primary Wound Type: Diabetic ulc   Side: Left   Orientation: medial   Location: Foot   Wound Number (optional):    Ankle-Brachial Index:    Pulses:    Removal Indication and Assessment:    Wound Outcome:    (Retired) Wound Type:    (Retired) Wound Length (cm):    (Retired) Wound Width (cm):    (Retired) Depth (cm):    Wound Description (Comments):    Removal Indications:    Wound Image   10/13/20 1100   Wound WDL ex 10/13/20 1100   Dressing Appearance Moist drainage 10/13/20 1100   Drainage Amount Moderate 10/13/20 1100   Drainage Characteristics/Odor Serosanguineous 10/13/20 1100   Appearance Pink;Red;Yellow;Slough;Epithelialization 10/13/20 1100   Tissue loss description Full thickness 10/13/20 1100   Black (%), Wound Tissue Color 0 % 10/13/20 1100   Red (%), Wound Tissue Color 90 % 10/13/20 1100   Yellow (%), Wound Tissue Color 10 % 10/13/20 1100    Periwound Area Intact;Scar tissue 10/13/20 1100   Wound Edges Open 10/13/20 1100   Wound Length (cm) 8.2 cm 10/13/20 1100   Wound Width (cm) 7.3 cm 10/13/20 1100   Wound Depth (cm) 0.1 cm 10/13/20 1100   Wound Volume (cm^3) 5.99 cm^3 10/13/20 1100   Wound Surface Area (cm^2) 59.86 cm^2 10/13/20 1100   Care Cleansed with:;Soap and water;Antimicrobial agent 10/13/20 1100   Dressing Changed;Collagen;Silver;Other (see comments);Cast padding;Tubular bandage 10/13/20 1100   Off Loading Football dressing 10/13/20 1100            Wound 05/08/20 1015 Diabetic Ulcer Left distal Foot (Active)   05/08/20 1015    Pre-existing: Yes   Primary Wound Type: Diabetic ulc   Side: Left   Orientation: distal   Location: Foot   Wound Number (optional):    Ankle-Brachial Index:    Pulses:    Removal Indication and Assessment:    Wound Outcome:    (Retired) Wound Type:    (Retired) Wound Length (cm):    (Retired) Wound Width (cm):    (Retired) Depth (cm):    Wound Description (Comments):    Removal Indications:    Wound Image   10/13/20 1100   Wound WDL ex 10/13/20 1100   Dressing Appearance Moist drainage 10/13/20 1100   Drainage Amount Moderate 10/13/20 1100   Drainage Characteristics/Odor Serosanguineous 10/13/20 1100   Appearance Red;Yellow;Slough;Epithelialization 10/13/20 1100   Tissue loss description Full thickness 10/13/20 1100   Black (%), Wound Tissue Color 0 % 10/13/20 1100   Red (%), Wound Tissue Color 80 % 10/13/20 1100   Yellow (%), Wound Tissue Color 20 % 10/13/20 1100   Periwound Area Intact;Scar tissue 10/13/20 1100   Wound Edges Open 10/13/20 1100   Wound Length (cm) 3 cm 10/13/20 1100   Wound Width (cm) 6.5 cm 10/13/20 1100   Wound Depth (cm) 0.1 cm 10/13/20 1100   Wound Volume (cm^3) 1.95 cm^3 10/13/20 1100   Wound Surface Area (cm^2) 19.5 cm^2 10/13/20 1100   Care Cleansed with:;Soap and water;Antimicrobial agent 10/13/20 1100   Dressing Changed;Collagen;Silver;Other (see comments);Cast padding;Tubular bandage  10/13/20 1100   Periwound Care Moisture barrier applied 10/13/20 1100   Off Loading Football dressing 10/13/20 1100            Wound 08/11/20 1051 Diabetic Ulcer Right medial Malleolus/Ankle (Active)   08/11/20 1051    Pre-existing: No   Primary Wound Type: Diabetic ulc   Side: Right   Orientation: medial   Location: Malleolus/Ankle   Wound Number (optional):    Ankle-Brachial Index:    Pulses:    Removal Indication and Assessment:    Wound Outcome:    (Retired) Wound Type:    (Retired) Wound Length (cm):    (Retired) Wound Width (cm):    (Retired) Depth (cm):    Wound Description (Comments):    Removal Indications:    Wound Image   10/13/20 1100   Wound WDL ex 10/13/20 1100   Dressing Appearance Moist drainage 10/13/20 1100   Drainage Amount Small 10/13/20 1100   Drainage Characteristics/Odor Serosanguineous 10/13/20 1100   Appearance Red;Granulating;Yellow;Slough 10/13/20 1100   Tissue loss description Full thickness 10/13/20 1100   Black (%), Wound Tissue Color 0 % 10/13/20 1100   Red (%), Wound Tissue Color 90 % 10/13/20 1100   Yellow (%), Wound Tissue Color 10 % 10/13/20 1100   Periwound Area Intact 10/13/20 1100   Wound Edges Open 10/13/20 1100   Wound Length (cm) 1 cm 10/13/20 1100   Wound Width (cm) 2 cm 10/13/20 1100   Wound Depth (cm) 0.2 cm 10/13/20 1100   Wound Volume (cm^3) 0.4 cm^3 10/13/20 1100   Wound Surface Area (cm^2) 2 cm^2 10/13/20 1100   Care Cleansed with:;Soap and water;Antimicrobial agent 10/13/20 1100   Dressing Changed;Other (see comments);Cast padding;Tubular bandage 10/13/20 1100   Periwound Care Moisture barrier applied 10/13/20 1100   Off Loading Football dressing 10/13/20 1100            Wound 08/11/20 1052 Diabetic Ulcer Right medial Heel (Active)   08/11/20 1052    Pre-existing: No   Primary Wound Type: Diabetic ulc   Side: Right   Orientation: medial   Location: Heel   Wound Number (optional):    Ankle-Brachial Index:    Pulses:    Removal Indication and Assessment:    Wound  Outcome:    (Retired) Wound Type:    (Retired) Wound Length (cm):    (Retired) Wound Width (cm):    (Retired) Depth (cm):    Wound Description (Comments):    Removal Indications:    Wound WDL ex 10/13/20 1100   Dressing Appearance Moist drainage 10/13/20 1100   Drainage Amount Moderate 10/13/20 1100   Drainage Characteristics/Odor Serosanguineous 10/13/20 1100   Appearance Necrotic;Black;Yellow;Green;Slough 10/13/20 1100   Tissue loss description Full thickness 10/13/20 1100   Black (%), Wound Tissue Color 90 % 10/13/20 1100   Red (%), Wound Tissue Color 0 % 10/13/20 1100   Yellow (%), Wound Tissue Color 10 % 10/13/20 1100   Periwound Area Intact 10/13/20 1100   Wound Edges Defined 10/13/20 1100   Wound Length (cm) 9 cm 10/13/20 1100   Wound Width (cm) 6.4 cm 10/13/20 1100   Wound Depth (cm) 0.5 cm 10/13/20 1100   Wound Volume (cm^3) 28.8 cm^3 10/13/20 1100   Wound Surface Area (cm^2) 57.6 cm^2 10/13/20 1100   Care Cleansed with:;Soap and water;Antimicrobial agent;Applied:;Povidone iodine 10/13/20 1100   Dressing Methylene blue/gentian violet;Abd pad;Cast padding;Tubular bandage 10/13/20 1100   Packing other (see comments) 10/13/20 1100   Periwound Care Moisture barrier applied 10/13/20 1100   Off Loading Football dressing 10/13/20 1100            Wound 09/15/20 1038 Blister(s) Right medial Leg (Active)   09/15/20 1038    Pre-existing: No   Primary Wound Type: Blister(s)   Side: Right   Orientation: medial   Location: Leg   Wound Number (optional):    Ankle-Brachial Index:    Pulses:    Removal Indication and Assessment:    Wound Outcome:    (Retired) Wound Type:    (Retired) Wound Length (cm):    (Retired) Wound Width (cm):    (Retired) Depth (cm):    Wound Description (Comments):    Removal Indications:    Wound Image   10/13/20 1100   Wound WDL ex 10/13/20 1100   Dressing Appearance Moist drainage 10/13/20 1100   Drainage Amount Small 10/13/20 1100   Drainage Characteristics/Odor Serous 10/13/20 1100   Appearance  Pink;Blistered 10/13/20 1100   Tissue loss description Partial thickness 10/13/20 1100   Wound Length (cm) 0.3 cm 10/13/20 1100   Wound Width (cm) 0.3 cm 10/13/20 1100   Wound Depth (cm) 0.15 cm 10/13/20 1100   Wound Volume (cm^3) 0.01 cm^3 10/13/20 1100   Wound Surface Area (cm^2) 0.09 cm^2 10/13/20 1100   Care Cleansed with:;Soap and water;Antimicrobial agent 10/13/20 1100   Dressing Applied;Collagen;Silver;Calcium alginate;Foam 10/13/20 1100   Periwound Care Skin barrier film applied 10/13/20 1100            Wound 09/29/20 1045 Other (comment) Right medial Toe, fifth (Active)   09/29/20 1045    Pre-existing:    Primary Wound Type: Other   Side: Right   Orientation: medial   Location: Toe, fifth   Wound Number (optional):    Ankle-Brachial Index:    Pulses:    Removal Indication and Assessment:    Wound Outcome:    (Retired) Wound Type:    (Retired) Wound Length (cm):    (Retired) Wound Width (cm):    (Retired) Depth (cm):    Wound Description (Comments):    Removal Indications:    Wound Image   10/13/20 1100   Wound WDL ex 10/13/20 1100   Dressing Appearance Dried drainage 10/13/20 1100   Drainage Amount Moderate 10/13/20 1100   Drainage Characteristics/Odor Serous 10/13/20 1100   Appearance [place] 10/13/20 1100   Tissue loss description Partial thickness 10/13/20 1100   Periwound Area Pale white;Macerated 10/13/20 1100   Wound Edges Open 10/13/20 1100   Wound Length (cm) 2 cm 10/13/20 1100   Wound Width (cm) 1 cm 10/13/20 1100   Wound Depth (cm) 0.1 cm 10/13/20 1100   Wound Volume (cm^3) 0.2 cm^3 10/13/20 1100   Wound Surface Area (cm^2) 2 cm^2 10/13/20 1100   Care Cleansed with:;Soap and water;Antimicrobial agent 10/13/20 1100   Dressing Changed;Calcium alginate;Silver;Foam;Cast padding;Tubular bandage 10/13/20 1100   Periwound Care Moisture barrier applied 10/13/20 1100   Off Loading Football dressing 10/13/20 1100            Wound 10/13/20 1202 Venous Ulcer Right anterior Leg (Active)   10/13/20 1202     Pre-existing: Yes   Primary Wound Type: Venous ulcer   Side: Right   Orientation: anterior   Location: Leg   Wound Number (optional):    Ankle-Brachial Index:    Pulses:    Removal Indication and Assessment:    Wound Outcome:    (Retired) Wound Type:    (Retired) Wound Length (cm):    (Retired) Wound Width (cm):    (Retired) Depth (cm):    Wound Description (Comments):    Removal Indications:    Wound Image   10/13/20 1100   Wound WDL ex 10/13/20 1100   Dressing Appearance Moist drainage 10/13/20 1100   Drainage Amount Large 10/13/20 1100   Drainage Characteristics/Odor Serous 10/13/20 1100   Appearance Blistered;Red;Pink 10/13/20 1100   Tissue loss description Partial thickness 10/13/20 1100   Red (%), Wound Tissue Color 100 % 10/13/20 1100   Periwound Area Swelling 10/13/20 1100   Wound Edges Open 10/13/20 1100   Wound Length (cm) 7.5 cm 10/13/20 1100   Wound Width (cm) 6.5 cm 10/13/20 1100   Wound Depth (cm) 0.1 cm 10/13/20 1100   Wound Volume (cm^3) 4.88 cm^3 10/13/20 1100   Wound Surface Area (cm^2) 48.75 cm^2 10/13/20 1100   Care Cleansed with:;Soap and water;Antimicrobial agent 10/13/20 1100   Dressing Changed;Calcium alginate;Silver;Collagen;Foam 10/13/20 1100   Periwound Care Skin barrier film applied 10/13/20 1100            Wound 10/13/20 1206 Diabetic Ulcer Right lateral Foot (Active)   10/13/20 1206    Pre-existing: Yes   Primary Wound Type: Diabetic ulc   Side: Right   Orientation: lateral   Location: Foot   Wound Number (optional):    Ankle-Brachial Index:    Pulses:    Removal Indication and Assessment:    Wound Outcome:    (Retired) Wound Type:    (Retired) Wound Length (cm):    (Retired) Wound Width (cm):    (Retired) Depth (cm):    Wound Description (Comments):    Removal Indications:    Wound Image   10/13/20 1100   Wound WDL ex 10/13/20 1100   Drainage Amount None 10/13/20 1100   Appearance Blistered;Yellow 10/13/20 1100   Tissue loss description Not applicable 10/13/20 1100   Periwound Area  Intact 10/13/20 1100   Wound Edges Rolled/closed 10/13/20 1100   Wound Length (cm) 1 cm 10/13/20 1100   Wound Width (cm) 3 cm 10/13/20 1100   Wound Depth (cm) 0 cm 10/13/20 1100   Wound Volume (cm^3) 0 cm^3 10/13/20 1100   Wound Surface Area (cm^2) 3 cm^2 10/13/20 1100   Care Cleansed with:;Soap and water;Antimicrobial agent;Applied:;Povidone iodine 10/13/20 1100   Dressing Applied;Other (see comments);Cast padding;Tubular bandage 10/13/20 1100   Off Loading Football dressing 10/13/20 1100            Incision/Site 06/16/20 0930 Right Toe, third anterior (Active)   06/16/20 0930   Present Prior to Hospital Arrival?: Yes   Side: Right   Location: Toe, third   Orientation: anterior   Incision Type:    Closure Method:    Additional Comments:    Removal Indication and Assessment:    Wound Outcome:    Removal Indications:    Wound Image   10/13/20 1100   Incision WDL ex 10/13/20 1100   Drainage Amount Scant 10/13/20 1100   Drainage Characteristics/Odor Serosanguineous 10/13/20 1100   Appearance Pink;Red;Epithelialization 10/13/20 1100   Red (%), Wound Tissue Color 100 % 10/13/20 1100   Periwound Area Intact 10/13/20 1100   Wound Edges Open 10/13/20 1100   Wound Length (cm) 0.2 cm 10/13/20 1100   Wound Width (cm) 0.5 cm 10/13/20 1100   Wound Depth (cm) 0.2 cm 10/13/20 1100   Wound Volume (cm^3) 0.02 cm^3 10/13/20 1100   Wound Surface Area (cm^2) 0.1 cm^2 10/13/20 1100   Care Cleansed with:;Soap and water;Antimicrobial agent 10/13/20 1100   Dressing Changed;Calcium alginate;Silver;Foam;Cast padding 10/13/20 1100   Off Loading Football dressing 10/13/20 1100           Plan:                Orders Placed This Encounter   Procedures    WOUND SUPPLIES FOR HOME USE     Clean all wounds with vashe. Calmoseptine to Left Foot periwound with stacy, ABD x 2, kerlix, stockinet. Change Q 3 days. Gentian violet periwound Right Foot with betadine to heel necrotic tissue, santyl/hydrofera blue transfer to line of slough and within wound  "depth, cover with ABD - change DAILY ; Aquacel AG ribbon to Right 5th toe with sandro foam - change DAILY, secure Right Foot with kerlix, stockinet - change DAILY. Right Lower Leg blisters: aquacel AG alginate, foam border -change Q 3 days.     Order Specific Question:   Height:     Answer:   5'10"     Order Specific Question:   Weight:     Answer:   194 lbs     Order Specific Question:   Length of need (1-99 months):     Answer:   1    Change dressing     Clean all wounds with vashe. Calmoseptine to Left Foot periwound with stacy, mextra, cast padding x 2, stockinet. Gentian violet periwound Right Foot with betadine to heel necrotic tissue, santyl/hydrofera blue transfer to line of slough and within wound depth, cover with ABD ; Aquacel AG ribbon to Right 5th toe with sandro foam overaly, mextra to Right Lateral Foot intact blister - secure Right Foot with cast padding x 2, stockinet. Right Lower Leg blisters: aquacel AG alginate, foam border.        Follow up in about 1 week (around 10/20/2020) for wound care.   Will discuss surgical debridement with Vascular to help with healing of right heel    Rubén Davis MD    "

## 2020-10-15 DIAGNOSIS — I70.233 ATHEROSCLEROSIS OF NATIVE ARTERY OF RIGHT LOWER EXTREMITY WITH ULCERATION OF ANKLE: Primary | ICD-10-CM

## 2020-10-15 DIAGNOSIS — I70.239 ATHEROSCLEROSIS OF RIGHT LOWER EXTREMITY WITH ULCERATION: ICD-10-CM

## 2020-10-20 ENCOUNTER — HOSPITAL ENCOUNTER (OUTPATIENT)
Dept: WOUND CARE | Facility: HOSPITAL | Age: 60
Discharge: HOME | End: 2020-10-20
Attending: FAMILY MEDICINE
Payer: MEDICAID

## 2020-10-20 VITALS
DIASTOLIC BLOOD PRESSURE: 70 MMHG | HEART RATE: 92 BPM | SYSTOLIC BLOOD PRESSURE: 119 MMHG | TEMPERATURE: 98 F | RESPIRATION RATE: 18 BRPM

## 2020-10-20 DIAGNOSIS — L97.529 TYPE 2 DIABETES MELLITUS WITH LEFT DIABETIC FOOT ULCER: ICD-10-CM

## 2020-10-20 DIAGNOSIS — L97.516 DIABETIC ULCER OF TOE OF RIGHT FOOT ASSOCIATED WITH TYPE 2 DIABETES MELLITUS, WITH BONE INVOLVEMENT WITHOUT EVIDENCE OF NECROSIS: ICD-10-CM

## 2020-10-20 DIAGNOSIS — E11.621 TYPE 2 DIABETES MELLITUS WITH LEFT DIABETIC FOOT ULCER: ICD-10-CM

## 2020-10-20 DIAGNOSIS — L97.919 DIABETIC ULCER OF RIGHT LOWER LEG: Primary | ICD-10-CM

## 2020-10-20 DIAGNOSIS — E11.622 DIABETIC ULCER OF RIGHT LOWER LEG: Primary | ICD-10-CM

## 2020-10-20 DIAGNOSIS — E11.621 TYPE 2 DIABETES MELLITUS WITH RIGHT DIABETIC FOOT ULCER: ICD-10-CM

## 2020-10-20 DIAGNOSIS — E11.621 DIABETIC ULCER OF TOE OF RIGHT FOOT ASSOCIATED WITH TYPE 2 DIABETES MELLITUS, WITH BONE INVOLVEMENT WITHOUT EVIDENCE OF NECROSIS: ICD-10-CM

## 2020-10-20 DIAGNOSIS — L97.519 TYPE 2 DIABETES MELLITUS WITH RIGHT DIABETIC FOOT ULCER: ICD-10-CM

## 2020-10-20 PROCEDURE — 11042 DEBRIDEMENT: ICD-10-PCS | Mod: ,,, | Performed by: FAMILY MEDICINE

## 2020-10-20 PROCEDURE — 99214 PR OFFICE/OUTPT VISIT, EST, LEVL IV, 30-39 MIN: ICD-10-PCS | Mod: 25,,, | Performed by: FAMILY MEDICINE

## 2020-10-20 PROCEDURE — 11042 DBRDMT SUBQ TIS 1ST 20SQCM/<: CPT | Performed by: FAMILY MEDICINE

## 2020-10-20 PROCEDURE — 11045 DEBRIDEMENT: ICD-10-PCS | Mod: ,,, | Performed by: FAMILY MEDICINE

## 2020-10-20 PROCEDURE — 27201912 HC WOUND CARE DEBRIDEMENT SUPPLIES: Performed by: FAMILY MEDICINE

## 2020-10-20 PROCEDURE — 11045 DBRDMT SUBQ TISS EACH ADDL: CPT | Mod: ,,, | Performed by: FAMILY MEDICINE

## 2020-10-20 PROCEDURE — 11042 DBRDMT SUBQ TIS 1ST 20SQCM/<: CPT | Mod: ,,, | Performed by: FAMILY MEDICINE

## 2020-10-20 PROCEDURE — 99214 OFFICE O/P EST MOD 30 MIN: CPT | Mod: 25,,, | Performed by: FAMILY MEDICINE

## 2020-10-20 PROCEDURE — 11045 DBRDMT SUBQ TISS EACH ADDL: CPT | Performed by: FAMILY MEDICINE

## 2020-10-20 RX ORDER — HYDROCODONE BITARTRATE AND ACETAMINOPHEN 7.5; 325 MG/1; MG/1
1 TABLET ORAL EVERY 8 HOURS PRN
Qty: 30 TABLET | Refills: 0 | Status: ON HOLD | OUTPATIENT
Start: 2020-10-20 | End: 2021-01-27 | Stop reason: SDUPTHER

## 2020-10-20 RX ORDER — COLLAGENASE SANTYL 250 [ARB'U]/G
OINTMENT TOPICAL DAILY
Qty: 90 G | Refills: 0 | Status: SHIPPED | OUTPATIENT
Start: 2020-10-20 | End: 2020-12-15 | Stop reason: SDUPTHER

## 2020-10-20 NOTE — PROGRESS NOTES
[facility]                Progress Note    Subjective:       Patient ID: Marvin Ray is a 60 y.o. male.    Chief Complaint: Non-healing Wound Follow Up, Venous Ulcer, and Diabetic Foot Ulcer    10/20/2020: F/U visit. Wounds are stable. Macerated periwound. Pt states pain to right foot 7/10. No fever. Surgical debridement on October 30th to the right heel. Discussed elevating/ offloading as much as possible. He has been offloading as directed.  Right foot improved in appearance since last week.  Complains of pain to lateral right foot      Review of Systems   Constitutional: Negative.    HENT: Negative.    Eyes: Negative.    Respiratory: Negative.    Cardiovascular: Negative.    Gastrointestinal: Positive for abdominal distention. Negative for abdominal pain, anal bleeding, blood in stool and constipation.   Genitourinary: Negative.    Musculoskeletal: Positive for myalgias.   Skin: Positive for wound.   Neurological: Positive for numbness.   Psychiatric/Behavioral: Negative.          Objective:        Physical Exam  Vitals signs reviewed.   Constitutional:       Appearance: Normal appearance.   HENT:      Head: Normocephalic and atraumatic.      Right Ear: External ear normal.      Left Ear: External ear normal.      Nose: Nose normal.      Mouth/Throat:      Mouth: Mucous membranes are moist.      Pharynx: Oropharynx is clear.   Eyes:      Extraocular Movements: Extraocular movements intact.      Pupils: Pupils are equal, round, and reactive to light.   Neck:      Musculoskeletal: Normal range of motion and neck supple.   Cardiovascular:      Rate and Rhythm: Normal rate and regular rhythm.   Pulmonary:      Effort: Pulmonary effort is normal. No respiratory distress.      Breath sounds: No wheezing.   Abdominal:      General: There is no distension.      Palpations: Abdomen is soft.      Tenderness: There is no abdominal tenderness.   Musculoskeletal:       "Right lower leg: No edema.      Left lower leg: No edema.   Skin:     General: Skin is warm and dry.      Comments: +bilateral DFU's with excess slough and maceration   Neurological:      Mental Status: He is alert and oriented to person, place, and time.      Sensory: Sensory deficit present.   Psychiatric:         Mood and Affect: Mood normal.         Behavior: Behavior normal.           Vitals:    10/20/20 1017   BP: 119/70   Pulse: 92   Resp: 18   Temp: 97.5 °F (36.4 °C)     Debridement    Date/Time: 10/20/2020 12:27 PM  Performed by: Rubén Davis MD  Authorized by: Rubén Davis MD     Time out: Immediately prior to procedure a "time out" was called to verify the correct patient, procedure, equipment, support staff and site/side marked as required.    Consent Done?:  Yes (Written)    Wound Details:    Location:  Left foot    Location:  Left Dorsal Foot    Type of Debridement:  Excisional       Length (cm):  2.8       Area (sq cm):  16.8       Width (cm):  6       Percent Debrided (%):  100       Depth (cm):  0.2       Total Area Debrided (sq cm):  16.8    Depth of debridement:  Subcutaneous tissue    Tissue debrided:  Epidermis, Dermis and Subcutaneous    Devitalized tissue debrided:  Biofilm, Fibrin and Slough    Instruments:  Curette    Additional wounds:  1    2nd Wound Details:     Location:  Left foot    Location:  Left Midfoot    Location:  Left Midfoot    Type of Debridement:  Excisional       Length (cm):  7.8       Area (sq cm):  45.24       Width (cm):  5.8       Percent Debrided (%):  100       Depth (cm):  0.2       Total Area Debrided (sq cm):  45.24    Depth of debridement:  Subcutaneous tissue    Tissue debrided:  Dermis, Epidermis and Subcutaneous    Devitalized tissue debrided:  Biofilm, Fibrin and Slough    Instruments:  Curette    Bleeding:  Minimal  Hemostasis Achieved: Yes    Method Used:  Pressure  Patient tolerance:  Patient tolerated the procedure well with no immediate " complications      Assessment:           ICD-10-CM ICD-9-CM   1. Diabetic ulcer of right lower leg  E11.622 250.80    L97.919 707.10   2. Type 2 diabetes mellitus with right diabetic foot ulcer  E11.621 250.80    L97.519 707.15   3. Diabetic ulcer of toe of right foot associated with type 2 diabetes mellitus, with bone involvement without evidence of necrosis  E11.621 250.80    L97.516 707.15   4. Type 2 diabetes mellitus with left diabetic foot ulcer  E11.621 250.80    L97.529 707.15            Wound 11/02/18 Diabetic Ulcer Left medial Foot (Active)   11/02/18     Pre-existing: Yes   Primary Wound Type: Diabetic ulc   Side: Left   Orientation: medial   Location: Foot   Wound Number (optional):    Ankle-Brachial Index:    Pulses:    Removal Indication and Assessment:    Wound Outcome:    (Retired) Wound Type:    (Retired) Wound Length (cm):    (Retired) Wound Width (cm):    (Retired) Depth (cm):    Wound Description (Comments):    Removal Indications:    Wound Image   10/20/20 1000   Wound WDL ex 10/20/20 1000   Dressing Appearance Intact;Moist drainage 10/20/20 1000   Drainage Amount Moderate 10/20/20 1000   Drainage Characteristics/Odor Green;Yellow 10/20/20 1000   Appearance Red;Pink;White;Slough 10/20/20 1000   Tissue loss description Full thickness 10/20/20 1000   Black (%), Wound Tissue Color 0 % 10/20/20 1000   Red (%), Wound Tissue Color 60 % 10/20/20 1000   Yellow (%), Wound Tissue Color 40 % 10/20/20 1000   Periwound Area Macerated 10/20/20 1000   Wound Edges Irregular 10/20/20 1000   Wound Length (cm) 7.8 cm 10/20/20 1000   Wound Width (cm) 5.2 cm 10/20/20 1000   Wound Depth (cm) 0.2 cm 10/20/20 1000   Wound Volume (cm^3) 8.11 cm^3 10/20/20 1000   Wound Surface Area (cm^2) 40.56 cm^2 10/20/20 1000   Tunneling (depth (cm)/location) 0 10/20/20 1000   Undermining (depth (cm)/location) 0 10/20/20 1000   Care Soap and water;Sterile normal saline;Wound cleanser 10/20/20 1000   Dressing Applied;Collagen;Abd pad  10/20/20 1000   Periwound Care Other (see comments) 10/20/20 1000   Off Loading Football dressing 10/20/20 1000   Dressing Change Due 10/27/20 10/20/20 1000            Wound 05/08/20 1015 Diabetic Ulcer Left distal Foot (Active)   05/08/20 1015    Pre-existing: Yes   Primary Wound Type: Diabetic ulc   Side: Left   Orientation: distal   Location: Foot   Wound Number (optional):    Ankle-Brachial Index:    Pulses:    Removal Indication and Assessment:    Wound Outcome:    (Retired) Wound Type:    (Retired) Wound Length (cm):    (Retired) Wound Width (cm):    (Retired) Depth (cm):    Wound Description (Comments):    Removal Indications:    Wound Image   10/20/20 1000   Wound WDL ex 10/20/20 1000   Dressing Appearance Intact;Moist drainage 10/20/20 1000   Drainage Amount Moderate 10/20/20 1000   Drainage Characteristics/Odor Green;Yellow 10/20/20 1000   Appearance Pink;Red;White;Slough 10/20/20 1000   Tissue loss description Full thickness 10/20/20 1000   Black (%), Wound Tissue Color 0 % 10/20/20 1000   Red (%), Wound Tissue Color 70 % 10/20/20 1000   Yellow (%), Wound Tissue Color 30 % 10/20/20 1000   Periwound Area Macerated 10/20/20 1000   Wound Edges Irregular 10/20/20 1000   Wound Length (cm) 2.8 cm 10/20/20 1000   Wound Width (cm) 6 cm 10/20/20 1000   Wound Depth (cm) 0.15 cm 10/20/20 1000   Wound Volume (cm^3) 2.52 cm^3 10/20/20 1000   Wound Surface Area (cm^2) 16.8 cm^2 10/20/20 1000   Tunneling (depth (cm)/location) 0 10/20/20 1000   Undermining (depth (cm)/location) 0 10/20/20 1000   Care Soap and water;Sterile normal saline;Wound cleanser 10/20/20 1000   Dressing Applied;Collagen;Abd pad 10/20/20 1000   Periwound Care Other (see comments) 10/20/20 1000   Off Loading Football dressing 10/20/20 1000   Dressing Change Due 10/27/20 10/20/20 1000            Wound 08/11/20 1051 Diabetic Ulcer Right medial Malleolus/Ankle (Active)   08/11/20 1051    Pre-existing: No   Primary Wound Type: Diabetic ulc   Side: Right    Orientation: medial   Location: Malleolus/Ankle   Wound Number (optional):    Ankle-Brachial Index:    Pulses:    Removal Indication and Assessment:    Wound Outcome:    (Retired) Wound Type:    (Retired) Wound Length (cm):    (Retired) Wound Width (cm):    (Retired) Depth (cm):    Wound Description (Comments):    Removal Indications:    Wound Image   10/20/20 1000   Wound WDL ex 10/20/20 1000   Dressing Appearance Intact;Moist drainage 10/20/20 1000   Drainage Amount Moderate 10/20/20 1000   Drainage Characteristics/Odor Serous 10/20/20 1000   Appearance Red;Slough;Hypergranulation;White 10/20/20 1000   Tissue loss description Full thickness 10/20/20 1000   Black (%), Wound Tissue Color 0 % 10/20/20 1000   Red (%), Wound Tissue Color 80 % 10/20/20 1000   Yellow (%), Wound Tissue Color 20 % 10/20/20 1000   Periwound Area Macerated 10/20/20 1000   Wound Edges Defined 10/20/20 1000   Wound Length (cm) 1.8 cm 10/20/20 1000   Wound Width (cm) 2 cm 10/20/20 1000   Wound Depth (cm) 0.3 cm 10/20/20 1000   Wound Volume (cm^3) 1.08 cm^3 10/20/20 1000   Wound Surface Area (cm^2) 3.6 cm^2 10/20/20 1000   Tunneling (depth (cm)/location) 0 10/20/20 1000   Undermining (depth (cm)/location) 0 10/20/20 1000   Care Soap and water;Sterile normal saline;Wound cleanser 10/20/20 1000   Dressing Applied;Collagen;Abd pad 10/20/20 1000   Periwound Care Other (see comments) 10/20/20 1000   Off Loading Football dressing 10/20/20 1000   Dressing Change Due 10/27/20 10/20/20 1000            Wound 08/11/20 1052 Diabetic Ulcer Right medial Heel (Active)   08/11/20 1052    Pre-existing: No   Primary Wound Type: Diabetic ulc   Side: Right   Orientation: medial   Location: Heel   Wound Number (optional):    Ankle-Brachial Index:    Pulses:    Removal Indication and Assessment:    Wound Outcome:    (Retired) Wound Type:    (Retired) Wound Length (cm):    (Retired) Wound Width (cm):    (Retired) Depth (cm):    Wound Description (Comments):     Removal Indications:    Wound Image   10/20/20 1000   Wound WDL ex 10/20/20 1000   Dressing Appearance Intact;Moist drainage 10/20/20 1000   Drainage Amount Moderate 10/20/20 1000   Drainage Characteristics/Odor Malodorous;Serous 10/20/20 1000   Appearance Eschar;Slough;Pink;Red;White 10/20/20 1000   Tissue loss description Full thickness 10/20/20 1000   Black (%), Wound Tissue Color 80 % 10/20/20 1000   Red (%), Wound Tissue Color 5 % 10/20/20 1000   Yellow (%), Wound Tissue Color 15 % 10/20/20 1000   Periwound Area Macerated 10/20/20 1000   Wound Edges Irregular 10/20/20 1000   Wound Length (cm) 8.5 cm 10/20/20 1000   Wound Width (cm) 8.9 cm 10/20/20 1000   Wound Depth (cm) 1.3 cm 10/20/20 1000   Wound Volume (cm^3) 98.34 cm^3 10/20/20 1000   Wound Surface Area (cm^2) 75.65 cm^2 10/20/20 1000   Tunneling (depth (cm)/location) 0 10/20/20 1000   Undermining (depth (cm)/location) 0 10/20/20 1000   Care Soap and water;Sterile normal saline;Wound cleanser 10/20/20 1000   Dressing Applied;Other (see comments);Abd pad 10/20/20 1000   Periwound Care Other (see comments) 10/20/20 1000   Off Loading Football dressing 10/20/20 1000   Dressing Change Due 10/27/20 10/20/20 1000            Wound 09/29/20 1045 Other (comment) Right medial Toe, fifth (Active)   09/29/20 1045    Pre-existing:    Primary Wound Type: Other   Side: Right   Orientation: medial   Location: Toe, fifth   Wound Number (optional):    Ankle-Brachial Index:    Pulses:    Removal Indication and Assessment:    Wound Outcome:    (Retired) Wound Type:    (Retired) Wound Length (cm):    (Retired) Wound Width (cm):    (Retired) Depth (cm):    Wound Description (Comments):    Removal Indications:    Wound Image   10/20/20 1000   Wound WDL ex 10/20/20 1000   Dressing Appearance Intact 10/20/20 1000   Drainage Amount None 10/20/20 1000   Appearance Black 10/20/20 1000   Tissue loss description Not applicable 10/20/20 1000   Black (%), Wound Tissue Color 100 %  10/20/20 1000   Red (%), Wound Tissue Color 0 % 10/20/20 1000   Yellow (%), Wound Tissue Color 0 % 10/20/20 1000   Periwound Area Dry 10/20/20 1000   Wound Edges Irregular 10/20/20 1000   Wound Length (cm) 0.4 cm 10/20/20 1000   Wound Width (cm) 0.3 cm 10/20/20 1000   Wound Depth (cm) 0.1 cm 10/20/20 1000   Wound Volume (cm^3) 0.01 cm^3 10/20/20 1000   Wound Surface Area (cm^2) 0.12 cm^2 10/20/20 1000   Tunneling (depth (cm)/location) 0 10/20/20 1000   Undermining (depth (cm)/location) 0 10/20/20 1000   Care Soap and water;Sterile normal saline;Wound cleanser 10/20/20 1000   Dressing Applied;Cast padding 10/20/20 1000   Off Loading Football dressing 10/20/20 1000   Dressing Change Due 10/27/20 10/20/20 1000            Wound 10/13/20 1202 Venous Ulcer Right anterior Leg (Active)   10/13/20 1202    Pre-existing: Yes   Primary Wound Type: Venous ulcer   Side: Right   Orientation: anterior   Location: Leg   Wound Number (optional):    Ankle-Brachial Index:    Pulses:    Removal Indication and Assessment:    Wound Outcome:    (Retired) Wound Type:    (Retired) Wound Length (cm):    (Retired) Wound Width (cm):    (Retired) Depth (cm):    Wound Description (Comments):    Removal Indications:    Wound Image   10/20/20 1000   Wound WDL ex 10/20/20 1000   Dressing Appearance Intact;Moist drainage 10/20/20 1000   Drainage Amount Small 10/20/20 1000   Drainage Characteristics/Odor Serosanguineous 10/20/20 1000   Appearance Red 10/20/20 1000   Tissue loss description Partial thickness 10/20/20 1000   Black (%), Wound Tissue Color 0 % 10/20/20 1000   Red (%), Wound Tissue Color 100 % 10/20/20 1000   Yellow (%), Wound Tissue Color 0 % 10/20/20 1000   Periwound Area Intact 10/20/20 1000   Wound Edges Irregular 10/20/20 1000   Wound Length (cm) 2.5 cm 10/20/20 1000   Wound Width (cm) 3.6 cm 10/20/20 1000   Wound Depth (cm) 0.1 cm 10/20/20 1000   Wound Volume (cm^3) 0.9 cm^3 10/20/20 1000   Wound Surface Area (cm^2) 9 cm^2 10/20/20  1000   Tunneling (depth (cm)/location) 0 10/20/20 1000   Undermining (depth (cm)/location) 0 10/20/20 1000   Care Soap and water;Sterile normal saline;Wound cleanser 10/20/20 1000   Dressing Applied;Other (see comments) 10/20/20 1000   Dressing Change Due 10/27/20 10/20/20 1000            Incision/Site 06/16/20 0930 Right Toe, third anterior (Active)   06/16/20 0930   Present Prior to Hospital Arrival?: Yes   Side: Right   Location: Toe, third   Orientation: anterior   Incision Type:    Closure Method:    Additional Comments:    Removal Indication and Assessment:    Wound Outcome:    Removal Indications:    Wound Image   10/20/20 1000   Incision WDL ex 10/20/20 1000   Dressing Appearance Intact;Moist drainage 10/20/20 1000   Drainage Amount Small 10/20/20 1000   Drainage Characteristics/Odor Serosanguineous;Creamy;Yellow 10/20/20 1000   Appearance Bone 10/20/20 1000   Black (%), Wound Tissue Color 0 % 10/20/20 1000   Red (%), Wound Tissue Color 100 % 10/20/20 1000   Yellow (%), Wound Tissue Color 0 % 10/20/20 1000   Periwound Area Intact 10/20/20 1000   Wound Edges Defined 10/20/20 1000   Wound Length (cm) 0.3 cm 10/20/20 1000   Wound Width (cm) 0.6 cm 10/20/20 1000   Wound Depth (cm) 0.3 cm 10/20/20 1000   Wound Volume (cm^3) 0.05 cm^3 10/20/20 1000   Wound Surface Area (cm^2) 0.18 cm^2 10/20/20 1000   Tunneling (depth (cm)/location) 0 10/20/20 1000   Undermining (depth (cm)/location) 0 10/20/20 1000   Care Soap and water;Sterile normal saline;Wound cleanser 10/20/20 1000   Dressing Applied;Collagen;Abd pad 10/20/20 1000   Off Loading Football dressing 10/20/20 1000   Dressing Change Due 10/27/20 10/20/20 1000       [REMOVED]      Wound 09/15/20 1038 Blister(s) Right medial Leg (Removed)   09/15/20 1038    Pre-existing: No   Primary Wound Type: Blister(s)   Side: Right   Orientation: medial   Location: Leg   Wound Number (optional):    Ankle-Brachial Index:    Pulses:    Removal Indication and Assessment:  closed/resurfaced   Wound Outcome: Healed   (Retired) Wound Type:    (Retired) Wound Length (cm):    (Retired) Wound Width (cm):    (Retired) Depth (cm):    Wound Description (Comments):    Removal Indications:    Removed 10/20/20 1116   Wound Image   10/20/20 1000   Wound WDL WDL 10/20/20 1000   Dressing Appearance Intact 10/20/20 1000   Drainage Amount None 10/20/20 1000   Appearance Epithelialization 10/20/20 1000   Tissue loss description Not applicable 10/20/20 1000   Black (%), Wound Tissue Color 0 % 10/20/20 1000   Red (%), Wound Tissue Color 0 % 10/20/20 1000   Yellow (%), Wound Tissue Color 0 % 10/20/20 1000   Periwound Area Intact 10/20/20 1000   Wound Edges Other (see comments) 10/20/20 1000   Wound Length (cm) 0 cm 10/20/20 1000   Wound Width (cm) 0 cm 10/20/20 1000   Wound Depth (cm) 0 cm 10/20/20 1000   Wound Volume (cm^3) 0 cm^3 10/20/20 1000   Wound Surface Area (cm^2) 0 cm^2 10/20/20 1000   Tunneling (depth (cm)/location) 0 10/20/20 1000   Undermining (depth (cm)/location) 0 10/20/20 1000   Care Soap and water;Sterile normal saline;Wound cleanser 10/20/20 1000       [REMOVED]      Wound 10/13/20 1206 Diabetic Ulcer Right lateral Foot (Removed)   10/13/20 1206    Pre-existing: Yes   Primary Wound Type: Diabetic ulc   Side: Right   Orientation: lateral   Location: Foot   Wound Number (optional):    Ankle-Brachial Index:    Pulses:    Removal Indication and Assessment: closed/resurfaced   Wound Outcome: Healed   (Retired) Wound Type:    (Retired) Wound Length (cm):    (Retired) Wound Width (cm):    (Retired) Depth (cm):    Wound Description (Comments):    Removal Indications:    Removed 10/20/20 1117   Wound Image   10/20/20 1000   Wound WDL WDL 10/20/20 1000   Dressing Appearance Intact 10/20/20 1000   Drainage Amount None 10/20/20 1000   Appearance Dry 10/20/20 1000   Tissue loss description Not applicable 10/20/20 1000   Black (%), Wound Tissue Color 0 % 10/20/20 1000   Red (%), Wound Tissue Color 0  % 10/20/20 1000   Yellow (%), Wound Tissue Color 0 % 10/20/20 1000   Periwound Area Dry 10/20/20 1000   Wound Edges Other (see comments) 10/20/20 1000   Wound Length (cm) 0 cm 10/20/20 1000   Wound Width (cm) 0 cm 10/20/20 1000   Wound Depth (cm) 0 cm 10/20/20 1000   Wound Volume (cm^3) 0 cm^3 10/20/20 1000   Wound Surface Area (cm^2) 0 cm^2 10/20/20 1000   Tunneling (depth (cm)/location) 0 10/20/20 1000   Undermining (depth (cm)/location) 0 10/20/20 1000   Care Soap and water;Sterile normal saline;Wound cleanser 10/20/20 1000           Plan:                Orders Placed This Encounter   Procedures    Debridement     This order was created via procedure documentation     Standing Status:   Standing     Number of Occurrences:   1    Change dressing     Clean all wounds with vashe. Gentian violet to all macerated areas. Left foot- endofrom to wound, ABD X2, football dressing (cast padding x 2, stockinet.) Right Foot with betadine to heel necrotic tissue, santyl to line of slough and within wound depth, endoform to medial malleolus/ toe third, ABD pads X3, football dressing (cast padding x 2, stockinet.) Right anterior leg- aquacel foam border.        Follow up in about 1 week (around 10/27/2020) for wound care.     Rubén Davis MD

## 2020-10-22 ENCOUNTER — OFFICE VISIT (OUTPATIENT)
Dept: INFECTIOUS DISEASES | Facility: CLINIC | Age: 60
End: 2020-10-22
Payer: MEDICAID

## 2020-10-22 VITALS
HEART RATE: 70 BPM | DIASTOLIC BLOOD PRESSURE: 65 MMHG | TEMPERATURE: 97 F | SYSTOLIC BLOOD PRESSURE: 105 MMHG | BODY MASS INDEX: 27.36 KG/M2 | WEIGHT: 191.13 LBS | HEIGHT: 70 IN

## 2020-10-22 DIAGNOSIS — M86.271 SUBACUTE OSTEOMYELITIS OF RIGHT FOOT: Primary | ICD-10-CM

## 2020-10-22 DIAGNOSIS — E11.621 DIABETIC ULCER OF TOE OF RIGHT FOOT ASSOCIATED WITH TYPE 2 DIABETES MELLITUS, WITH BONE INVOLVEMENT WITHOUT EVIDENCE OF NECROSIS: ICD-10-CM

## 2020-10-22 DIAGNOSIS — L97.516 DIABETIC ULCER OF TOE OF RIGHT FOOT ASSOCIATED WITH TYPE 2 DIABETES MELLITUS, WITH BONE INVOLVEMENT WITHOUT EVIDENCE OF NECROSIS: ICD-10-CM

## 2020-10-22 PROCEDURE — 99999 PR PBB SHADOW E&M-EST. PATIENT-LVL IV: CPT | Mod: PBBFAC,,, | Performed by: INTERNAL MEDICINE

## 2020-10-22 PROCEDURE — 99214 OFFICE O/P EST MOD 30 MIN: CPT | Mod: PBBFAC | Performed by: INTERNAL MEDICINE

## 2020-10-22 PROCEDURE — 99213 PR OFFICE/OUTPT VISIT, EST, LEVL III, 20-29 MIN: ICD-10-PCS | Mod: S$PBB,,, | Performed by: INTERNAL MEDICINE

## 2020-10-22 PROCEDURE — 99999 PR PBB SHADOW E&M-EST. PATIENT-LVL IV: ICD-10-PCS | Mod: PBBFAC,,, | Performed by: INTERNAL MEDICINE

## 2020-10-22 PROCEDURE — 99213 OFFICE O/P EST LOW 20 MIN: CPT | Mod: S$PBB,,, | Performed by: INTERNAL MEDICINE

## 2020-10-22 NOTE — PROGRESS NOTES
Subjective:      Patient ID: Marvin Ray is a 60 y.o. male.    Chief Complaint:Follow-up      History of Present Illness    61 y/o with a history of DM type 2, CAD with CHF s/p AICD placement.  He also has a history of diabetic foot ulcer and osteomyelitis.  He was treated by myself earlier this year.  He is referred to me for evaluation of a foot ulcer.  Cultures were positive for pseudomonas and group b strep.  He has been placed on cefadroxil and ciprofloxacin. Since his last visit, he reports a new ulcer to the 5th toe dorsal area.      Review of Systems   Constitution: Negative for chills, decreased appetite, fever, malaise/fatigue, night sweats, weight gain and weight loss.   HENT: Negative for congestion, ear pain, hearing loss, hoarse voice, sore throat and tinnitus.    Eyes: Negative for blurred vision, redness and visual disturbance.   Cardiovascular: Negative for chest pain, leg swelling and palpitations.   Respiratory: Negative for cough, hemoptysis, shortness of breath and sputum production.    Hematologic/Lymphatic: Negative for adenopathy. Does not bruise/bleed easily.   Skin: Negative for dry skin, itching, rash and suspicious lesions.   Musculoskeletal: Negative for back pain, joint pain, myalgias and neck pain.   Gastrointestinal: Negative for abdominal pain, constipation, diarrhea, heartburn, nausea and vomiting.   Genitourinary: Negative for dysuria, flank pain, frequency, hematuria, hesitancy and urgency.   Neurological: Negative for dizziness, headaches, numbness, paresthesias and weakness.   Psychiatric/Behavioral: Negative for depression and memory loss. The patient does not have insomnia and is not nervous/anxious.      Objective:   Physical Exam  Vitals signs and nursing note reviewed.   Constitutional:       General: He is not in acute distress.     Appearance: He is well-developed. He is not diaphoretic.   HENT:      Head: Normocephalic and atraumatic.      Right Ear: External ear  normal.      Left Ear: External ear normal.      Nose: Nose normal.      Mouth/Throat:      Pharynx: No oropharyngeal exudate.   Eyes:      General: No scleral icterus.        Right eye: No discharge.         Left eye: No discharge.      Conjunctiva/sclera: Conjunctivae normal.      Pupils: Pupils are equal, round, and reactive to light.   Neck:      Musculoskeletal: Normal range of motion and neck supple.      Thyroid: No thyromegaly.      Vascular: No JVD.      Trachea: No tracheal deviation.   Cardiovascular:      Rate and Rhythm: Normal rate and regular rhythm.      Heart sounds: No murmur. No friction rub. No gallop.    Pulmonary:      Effort: Pulmonary effort is normal. No respiratory distress.      Breath sounds: Normal breath sounds. No stridor. No wheezing or rales.   Chest:      Chest wall: No tenderness.   Abdominal:      General: Bowel sounds are normal. There is no distension.      Palpations: Abdomen is soft. There is no mass.      Tenderness: There is no abdominal tenderness. There is no guarding or rebound.   Musculoskeletal: Normal range of motion.         General: No tenderness.      Comments: Lower extremities are wrapped in dressings.  Pictures from wound care are in media tab and were reviewed.   Lymphadenopathy:      Cervical: No cervical adenopathy.   Skin:     General: Skin is warm.      Coloration: Skin is not pale.      Findings: No erythema or rash.   Neurological:      Mental Status: He is alert and oriented to person, place, and time.      Cranial Nerves: No cranial nerve deficit.      Motor: No abnormal muscle tone.      Coordination: Coordination normal.      Deep Tendon Reflexes: Reflexes are normal and symmetric. Reflexes normal.   Psychiatric:         Behavior: Behavior normal.         Thought Content: Thought content normal.         Judgment: Judgment normal.       Assessment:       1. Subacute osteomyelitis of right foot    2. Diabetic ulcer of toe of right foot associated with  type 2 diabetes mellitus, with bone involvement without evidence of necrosis        61 y/o with a history of DM type 2, CAD with CHF s/p AICD placement.  He also has a history of diabetic foot ulcer and osteomyelitis.  He was treated by myself earlier this year.  He is referred to me for evaluation of a foot ulcer.  Cultures were positive for pseudomonas and group b strep.  He has been placed on cefadroxil and ciprofloxacin. Since his last visit, he reports a new ulcer to the 5th toe dorsal area.    He has now completed a 6 week course of oral antibiotics.  Wounds appear stable.  If ulcers fail to progress, repeat cultures should be obtained from each ulcer of concern while patient is not on any antibiotics.  For now I will stop the antibiotics.  Continue with local wound care.  Follow up as needed.  Plan:       Subacute osteomyelitis of right foot   - plan as outlined above    Diabetic ulcer of toe of right foot associated with type 2 diabetes mellitus, with bone involvement without evidence of necrosis   - plan as outlined above

## 2020-10-23 ENCOUNTER — HOSPITAL ENCOUNTER (OUTPATIENT)
Dept: CARDIOLOGY | Facility: HOSPITAL | Age: 60
Discharge: HOME | End: 2020-10-23
Attending: SURGERY
Payer: MEDICAID

## 2020-10-23 ENCOUNTER — ANESTHESIA EVENT (OUTPATIENT)
Dept: SURGERY | Facility: HOSPITAL | Age: 60
End: 2020-10-23
Payer: MEDICAID

## 2020-10-23 DIAGNOSIS — I70.239 ATHEROSCLEROSIS OF RIGHT LOWER EXTREMITY WITH ULCERATION: ICD-10-CM

## 2020-10-23 PROCEDURE — 93925 CV US DOPPLER ARTERIAL LEGS BILATERAL (CUPID ONLY): ICD-10-PCS | Mod: 26,,, | Performed by: SURGERY

## 2020-10-23 PROCEDURE — 93922 ANKLE BRACHIAL INDICES (ABI): ICD-10-PCS | Mod: 26,,, | Performed by: SURGERY

## 2020-10-23 PROCEDURE — 93925 LOWER EXTREMITY STUDY: CPT | Mod: 26,,, | Performed by: SURGERY

## 2020-10-23 PROCEDURE — 93922 UPR/L XTREMITY ART 2 LEVELS: CPT | Mod: 26,,, | Performed by: SURGERY

## 2020-10-23 PROCEDURE — 93922 UPR/L XTREMITY ART 2 LEVELS: CPT

## 2020-10-23 PROCEDURE — 93925 LOWER EXTREMITY STUDY: CPT

## 2020-10-23 NOTE — PRE-PROCEDURE INSTRUCTIONS
LVM with Mr. BoogieFlor, reviewed his chart, hx of ICD, has not followed up with Dr. Romano, got him an appt for 10/27 at 8:20, please go to his office first then pre-op. Left ph # to discuss.Will follow up

## 2020-10-26 ENCOUNTER — OFFICE VISIT (OUTPATIENT)
Dept: VASCULAR SURGERY | Facility: CLINIC | Age: 60
End: 2020-10-26
Payer: MEDICAID

## 2020-10-26 VITALS — HEIGHT: 70 IN | BODY MASS INDEX: 27.43 KG/M2 | SYSTOLIC BLOOD PRESSURE: 120 MMHG | DIASTOLIC BLOOD PRESSURE: 80 MMHG

## 2020-10-26 DIAGNOSIS — S91.302D OPEN WOUND OF FOOT, LEFT, SUBSEQUENT ENCOUNTER: ICD-10-CM

## 2020-10-26 DIAGNOSIS — I70.245 ATHEROSCLEROSIS OF NATIVE ARTERY OF LEFT LOWER EXTREMITY WITH ULCERATION OF OTHER PART OF FOOT: ICD-10-CM

## 2020-10-26 DIAGNOSIS — I70.233 ATHEROSCLEROSIS OF NATIVE ARTERY OF RIGHT LOWER EXTREMITY WITH ULCERATION OF ANKLE: Primary | ICD-10-CM

## 2020-10-26 PROCEDURE — 99999 PR PBB SHADOW E&M-EST. PATIENT-LVL IV: CPT | Mod: PBBFAC,,, | Performed by: SURGERY

## 2020-10-26 PROCEDURE — 99215 PR OFFICE/OUTPT VISIT, EST, LEVL V, 40-54 MIN: ICD-10-PCS | Mod: S$PBB,,, | Performed by: SURGERY

## 2020-10-26 PROCEDURE — 99215 OFFICE O/P EST HI 40 MIN: CPT | Mod: S$PBB,,, | Performed by: SURGERY

## 2020-10-26 PROCEDURE — 99214 OFFICE O/P EST MOD 30 MIN: CPT | Mod: PBBFAC | Performed by: SURGERY

## 2020-10-26 PROCEDURE — 99999 PR PBB SHADOW E&M-EST. PATIENT-LVL IV: ICD-10-PCS | Mod: PBBFAC,,, | Performed by: SURGERY

## 2020-10-26 NOTE — H&P (VIEW-ONLY)
Mitch Chan MD [facility]                         10/26/2020    HPI:  Marvin Ray is a 60 y.o. male with   Patient Active Problem List   Diagnosis    Epiretinal membrane, both eyes    Nuclear sclerotic cataract of right eye    Pseudophakia, left eye    Ptosis, left eyelid    DM type 2 with diabetic peripheral neuropathy    HLD (hyperlipidemia)    Neovascular glaucoma of both eyes    Tophaceous gout    Essential hypertension    Coronary artery disease involving native coronary artery of native heart without angina pectoris    Neovascular glaucoma of left eye, severe stage    Statin myopathy    Neovascular glaucoma, right eye, mild stage    PVD (peripheral vascular disease)    Right wrist pain    Hepatosplenomegaly    Type 2 diabetes mellitus with left diabetic foot ulcer    Other ascites    MDR Acinetobacter baumannii infection    Debility    Subacute osteomyelitis of left foot    Stage 3 chronic kidney disease    Atherosclerosis of left lower extremity with ulceration of midfoot    CKD stage 3 due to type 2 diabetes mellitus    Anemia due to multiple mechanisms    Persistent proteinuria    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  Seen in hospital last mo with LLE cellulitis.  Treated with Abx.  Also had paracentesis for ascites with negative w/u per family.  Patient states location is L foot occurring for 1-2 mo, worsening  foot wound although improvement in edema and erythema.  Associated signs and symptoms include pain and edema.  Quality is aching and severity is 5/10.  Symptoms began 10/2018 spontaneously with blistering and severe edema.  Alleviating factors include wound care and elevation.  Worsening factors include pressure.    Tobacco use: denies    1/4/19: s/p LLE angioplasty and multiple subsequent OR and bedside debridements.  On IV Abx per culture results.  Glucose better controlled.  No fevers.  Receiving local wound care with Santyl.    1/14/19:  Cont to receive local wound care.  Pseudomonas in tissue and bone cultures multidrug resistant other than aminoglycoside; d/w Dr. Velez with high risk of ESRD with 6 weeks of aminoglycoside treatment.  No F/C.    1/31/19:  Pt without complaints.  Was started back on Bactrim.  Family doing wound care.    2/28/19: S/p L foot debridement 2/13/19.  Started on Meropenem.    3/28/19:  No complaints.  S/p paracentesis and pt feels better.    5/16/19:  S/p L peroneal and AT angioplasty, wound healing well.    9/30/19:  S/p L foot debridement 6/5/19. WV placed.    11/2019:  States wound is healing well.  Did not see Plastic Surgery clinic.  Cont wound care center and home health wound care.    2/2020: States wound nearly fully healed.  Receiving wound care daily.    5/2020:  Wounds healing.  Seeing Dr. Davis next week.    7/2020:  Doing well with L foot wound, has developed a R medial maleolus wound due to trauma    8/2020:  Wounds healing BLE.    9/2020: RLE wounds not healing well.  No F/C.    10/2020:  S/p R SFA angioplasty with a 5x60 mm Ultraverse balloon 10/6/20.  States wounds look better, receiving daily wound care.    Past Medical History:   Diagnosis Date    Arthritis     Cellulitis     CKD (chronic kidney disease), stage III     Coronary artery disease     Diabetes mellitus     Diabetic retinopathy     Diabetic ulcer of left foot     Glaucoma     Gout     Hyperlipemia      Hypertension     ICD (implantable cardioverter-defibrillator) in place 11/02/2018    Left chest    Non-pressure chronic ulcer of other part of left foot with fat layer exposed 10/23/2018    PVD (peripheral vascular disease)     Type 2 diabetes mellitus with left diabetic foot ulcer 10/29/2018    Unsteady gait     uses a wheelchair     Past Surgical History:   Procedure Laterality Date    AHMED GLAUCOMA IMPLANT Left 2011    DONE AT [facility]    AORTOGRAPHY WITH SERIALOGRAPHY Left 4/30/2019    Procedure: AORTOGRAM, WITH SERIALOGRAPHY, LEFT LOWER EXTREMITY, POSSIBLE INTERVENTION;  Surgeon: Mitch Chan MD;  Location: [facility] OR;  Service: Vascular;  Laterality: Left;  RN PRE OP 4-23-19.  NO H & P, No Cosent  [place]    AORTOGRAPHY WITH SERIALOGRAPHY Right 10/6/2020    Procedure: AORTOGRAM, WITH SERIALOGRAPHY, RIGHT LOWER EXTREMITY ANGIOGRAM, POSSIBLE INTERVENTION;  Surgeon: Mitch Chan MD;  Location: [facility] OR;  Service: Vascular;  Laterality: Right;  RN PREOP 10/1/2020--COVID NEGATIVE ON 10/5    BAERVELDT GLAUCOMA IMPLANT Left 2012    WITH CATARACT EXTRACTION//DONE AT [facility]    CARDIAC CATHETERIZATION Left 05/2016    CARDIAC DEFIBRILLATOR PLACEMENT Left 11/02/2018    CATARACT EXTRACTION W/  INTRAOCULAR LENS IMPLANT Left 2012    WITH BAERVEDT//DONE AT [facility]    CATARACT EXTRACTION W/  INTRAOCULAR LENS IMPLANT Right 09/26/2018    COMPLEX ()    CB DESTRUCTION WITH CYCLO G6 Left 02/15/2017        CYST REMOVAL      DEBRIDEMENT OF FOOT  12/28/2018    Procedure: DEBRIDEMENT, FOOT;  Surgeon: Mitch Chan MD;  Location: [facility] OR;  Service: Vascular;;    DEBRIDEMENT OF FOOT  6/5/2019    Procedure: DEBRIDEMENT, FOOT;  Surgeon: Mitch Chan MD;  Location: [facility] OR;  Service: Vascular;;    EXAMINATION UNDER ANESTHESIA Left 12/5/2018    Procedure: Exam under anesthesia, left foot debridement, washout and all other indicated procedures;  Surgeon: Mitch BAINS  MD Dustin;  Location: [facility] OR;  Service: Vascular;  Laterality: Left;  1030AM START  RN PREOP 12/3/2018-----AICD  SEEN BY DR JAMES 12/4    EXAMINATION UNDER ANESTHESIA Left 2/13/2019    Procedure: LEFT FOOT WOUND DEBRIDEMENT, WASHOUT AND ALL OTHER INDICATED PROCEDURES;  Surgeon: Mitch Wall MD;  Location: [facility] OR;  Service: Vascular;  Laterality: Left;  requesting 1st case start  THIS CASE 1ST PER DR WALL ON 2/1/19 @ 844AM -LO  RN PREOP 2/6/2019  HAS CARDIAC CLEARANCE    EXAMINATION UNDER ANESTHESIA Left 12/28/2018    Procedure: Exam under anesthesia, left foot debridement, left foot washout, possible left second through fifth metatarsal resection;  Surgeon: Mitch Wall MD;  Location: [facility] OR;  Service: Vascular;  Laterality: Left;  1:00pm start per julissa @ 8:58am  RN  PHONE PRE OP 12-27-18--=Pt coming from [facility]  NEED CONSENT    EXAMINATION UNDER ANESTHESIA Left 6/5/2019    Procedure: LEFT FOOT DEBRIDEMENT, WASHOUT AND ALL OTHER INDICATED PROCEDURES;  Surgeon: Mitch Wall MD;  Location: [facility] OR;  Service: Vascular;  Laterality: Left;  RN PRE OP 5-31-19------ICD------NEED CONSENT    INCISION AND DRAINAGE Left 11/14/2018    Procedure: Incision and Drainage, left lower extremity debridement, washout;  Surgeon: Mitch Wall MD;  Location: [facility] OR;  Service: Vascular;  Laterality: Left;    INCISION AND DRAINAGE Left 11/16/2018    Procedure: INCISION AND DRAINAGE;  Surgeon: Mitch Wall MD;  Location: [facility] OR;  Service: Vascular;  Laterality: Left;    INTRAOCULAR PROSTHESES INSERTION Right 9/26/2018    Procedure: INSERTION, IOL PROSTHESIS;  Surgeon: Perla Cortés MD;  Location: [facility] OR [address] [place] [address];  Service: Ophthalmology;  Laterality: Right;    PERITONEOCENTESIS N/A 3/1/2019    Procedure: PARACENTESIS, ABDOMINAL, INITIAL;  Surgeon: [facility] Diagnostic Provider;  Location: [facility] OR;  Service: Radiology;  Laterality: N/A;    PERITONEOCENTESIS N/A  3/25/2019    Procedure: PARACENTESIS, ABDOMINAL, INITIAL;  Surgeon: Dosc Diagnostic Provider;  [facility]: [facility] OR;  Service: Radiology;  Laterality: N/A;    PERITONEOCENTESIS N/A 7/22/2019    Procedure: PARACENTESIS, ABDOMINAL, INITIAL;  Surgeon: Dosc Diagnostic Provider;  Location: [facility] OR;  Service: Radiology;  Laterality: N/A;    PERITONEOCENTESIS N/A 8/16/2019    Procedure: PARACENTESIS, ABDOMINAL, INITIAL;  Surgeon: Dosc Diagnostic Provider;  [facility];  Service: Radiology;  Laterality: N/A;    PERITONEOCENTESIS N/A 9/26/2019    Procedure: PARACENTESIS, ABDOMINAL;  Surgeon: Dosc Diagnostic Provider;  [facility];  Service: Radiology;  Laterality: N/A;    PERITONEOCENTESIS N/A 10/11/2019    Procedure: PARACENTESIS, ABDOMINAL;  Surgeon: Dosc Diagnostic Provider;  [facility];  Service: Radiology;  Laterality: N/A;    PERITONEOCENTESIS N/A 10/31/2019    Procedure: PARACENTESIS, ABDOMINAL;  Surgeon: Dosc Diagnostic Provider;  [facility];  Service: Radiology;  Laterality: N/A;    PERITONEOCENTESIS N/A 11/18/2019    Procedure: PARACENTESIS, ABDOMINAL;  Surgeon: Dosc Diagnostic Provider;  [facility];  Service: Radiology;  Laterality: N/A;    PERITONEOCENTESIS N/A 12/16/2019    Procedure: PARACENTESIS, ABDOMINAL;  Surgeon: Dosc Diagnostic Provider;  [facility];  Service: Radiology;  Laterality: N/A;  2PM START    PERITONEOCENTESIS N/A 2/7/2020    Procedure: PARACENTESIS, ABDOMINAL;  Surgeon: Dosc Diagnostic Provider;  [facility];  Service: Radiology;  Laterality: N/A;  REQUESTED 10:30A START    PERITONEOCENTESIS N/A 2/19/2020    Procedure: PARACENTESIS, ABDOMINAL;  Surgeon: Dosc Diagnostic Provider;  [facility];  Service: Radiology;  Laterality: N/A;    PERITONEOCENTESIS N/A 2/28/2020    Procedure: PARACENTESIS, ABDOMINAL;  Surgeon: Dosc Diagnostic Provider;  Location: [facility] OR;  Service: Radiology;  Laterality: N/A;  REQUESTED 10AM START     PHACOEMULSIFICATION OF CATARACT Right 9/26/2018    Procedure: PHACOEMULSIFICATION, CATARACT;  Surgeon: Perla Cortés MD;  Location: [facility] OR [address] [place] [address];  Service: Ophthalmology;  Laterality: Right;    REPEAT ABDOMINAL PARACENTESIS N/A 2/11/2019    Procedure: PARACENTESIS, ABDOMINAL, REPEAT;  Surgeon: Dosc Diagnostic Provider;  Location: [facility] OR;  Service: Radiology;  Laterality: N/A;  1PM START    REPEAT ABDOMINAL PARACENTESIS N/A 9/12/2019    Procedure: PARACENTESIS, ABDOMINAL, REPEAT;  Surgeon: Dosc Diagnostic Provider;  Location: [facility] OR;  Service: Radiology;  Laterality: N/A;  9AM START    REPEAT ABDOMINAL PARACENTESIS N/A 12/2/2019    Procedure: PARACENTESIS, ABDOMINAL, REPEAT;  Surgeon: Dosc Diagnostic Provider;  Location: [facility] OR;  Service: Radiology;  Laterality: N/A;  3PM START    REPEAT ABDOMINAL PARACENTESIS N/A 1/10/2020    Procedure: PARACENTESIS, ABDOMINAL, REPEAT;  Surgeon: Dosc Diagnostic Provider;  Location: [facility] OR;  Service: Radiology;  Laterality: N/A;  1130AM START    REPEAT ABDOMINAL PARACENTESIS N/A 1/20/2020    Procedure: PARACENTESIS, ABDOMINAL, REPEAT;  Surgeon: Dosc Diagnostic Provider;  Location: [facility] OR;  Service: Radiology;  Laterality: N/A;  1PM START    REPEAT ABDOMINAL PARACENTESIS N/A 1/31/2020    Procedure: PARACENTESIS, ABDOMINAL, REPEAT;  Surgeon: Dosc Diagnostic Provider;  Location: [facility] OR;  Service: Radiology;  Laterality: N/A;  10AM START    REPEAT ABDOMINAL PARACENTESIS N/A 3/16/2020    Procedure: PARACENTESIS, ABDOMINAL, REPEAT;  Surgeon: Dosc Diagnostic Provider;  Location: [facility] OR;  Service: Radiology;  Laterality: N/A;  10AM START    REPEAT ABDOMINAL PARACENTESIS N/A 3/30/2020    Procedure: PARACENTESIS, ABDOMINAL, REPEAT;  Surgeon: Dosc Diagnostic Provider;  Location: [facility] OR;  Service: Radiology;  Laterality: N/A;    REPEAT ABDOMINAL PARACENTESIS N/A 4/9/2020    Procedure: PARACENTESIS, ABDOMINAL, REPEAT;  Surgeon: Dosc Diagnostic Provider;  Location: [facility]  OR;  Service: Radiology;  Laterality: N/A;  1130AM START    REPEAT ABDOMINAL PARACENTESIS N/A 5/8/2020    Procedure: PARACENTESIS, ABDOMINAL, REPEAT;  Surgeon: Dosc Diagnostic Provider;  [facility];  Service: Radiology;  Laterality: N/A;  9AM START    REPEAT ABDOMINAL PARACENTESIS N/A 5/21/2020    Procedure: PARACENTESIS, ABDOMINAL, REPEAT;  Surgeon: Dosc Diagnostic Provider;  [facility];  Service: Radiology;  Laterality: N/A;  10AM START    REPEAT ABDOMINAL PARACENTESIS N/A 6/5/2020    Procedure: PARACENTESIS, ABDOMINAL, REPEAT;  Surgeon: Dosc Diagnostic Provider;  [facility];  Service: Radiology;  Laterality: N/A;  NOON START    REPEAT ABDOMINAL PARACENTESIS N/A 7/10/2020    Procedure: PARACENTESIS, ABDOMINAL, REPEAT;  Surgeon: Dosc Diagnostic Provider;  [facility];  Service: Radiology;  Laterality: N/A;  1PM START    REPEAT ABDOMINAL PARACENTESIS N/A 8/20/2020    Procedure: PARACENTESIS, ABDOMINAL, REPEAT;  Surgeon: Dosc Diagnostic Provider;  [facility];  Service: Radiology;  Laterality: N/A;  1PM START    REPEAT ABDOMINAL PARACENTESIS N/A 9/4/2020    Procedure: PARACENTESIS, ABDOMINAL, REPEAT;  Surgeon: Dosc Diagnostic Provider;  Location: [facility] OR;  Service: Radiology;  Laterality: N/A;  11 AM START    REPEAT ABDOMINAL PARACENTESIS N/A 9/16/2020    Procedure: PARACENTESIS, ABDOMINAL, REPEAT;  Surgeon: Dosc Diagnostic Provider;  Location: [facility] OR;  Service: Radiology;  Laterality: N/A;  START 2:00 P.M.    REPEAT ABDOMINAL PARACENTESIS N/A 9/28/2020    Procedure: PARACENTESIS, ABDOMINAL, REPEAT;  Surgeon: Dosc Diagnostic Provider;  Location: [facility] OR;  Service: Radiology;  Laterality: N/A;  1 P.M. START    REVISION OF PROCEDURE INVOLVING GLAUCOMA DRAINAGE DEVICE Left 10/2/2019    EXTRUDING BAERVELDT /WITH PERICARDIAL PATCH GRAFT ()    Right foot surgery  10/2014    TOE AMPUTATION Right     first and second    TOE AMPUTATION Left 11/16/2018    Procedure:  AMPUTATION, TOES  2-5;  Surgeon: Mitch Chan MD;  Location: [facility] OR;  Service: Vascular;  Laterality: Left;    TOE AMPUTATION Left 2018    Procedure: AMPUTATION, TOE;  Surgeon: Mitch Chan MD;  Location: [facility] OR;  Service: Vascular;  Laterality: Left;  left great toe    TONSILLECTOMY       Family History   Problem Relation Age of Onset    Diabetes Mother     Heart disease Brother     No Known Problems Father     No Known Problems Sister     No Known Problems Maternal Aunt     No Known Problems Maternal Uncle     No Known Problems Paternal Aunt     No Known Problems Paternal Uncle     No Known Problems Maternal Grandmother     No Known Problems Maternal Grandfather     No Known Problems Paternal Grandmother     No Known Problems Paternal Grandfather     Anemia Neg Hx     Arrhythmia Neg Hx     Asthma Neg Hx     Clotting disorder Neg Hx     Fainting Neg Hx     Heart attack Neg Hx     Heart failure Neg Hx     Hyperlipidemia Neg Hx     Hypertension Neg Hx     Stroke Neg Hx     Atrial Septal Defect Neg Hx     Amblyopia Neg Hx     Blindness Neg Hx     Glaucoma Neg Hx     Macular degeneration Neg Hx     Retinal detachment Neg Hx     Strabismus Neg Hx      Social History     Socioeconomic History    Marital status: Single     Spouse name: Not on file    Number of children: Not on file    Years of education: 14    Highest education level: Not on file   Occupational History    Not on file   Social Needs    Financial resource strain: Not on file    Food insecurity     Worry: Not on file     Inability: Not on file    Transportation needs     Medical: Not on file     Non-medical: Not on file   Tobacco Use    Smoking status: Former Smoker     Packs/day: 1.00     Years: 3.00     Pack years: 3.00     Types: Cigarettes     Quit date: 1984     Years since quittin.3    Smokeless tobacco: Never Used   Substance and Sexual Activity    Alcohol use: Not Currently      Alcohol/week: 1.0 standard drinks     Types: 1 Cans of beer per week     Comment: rarely    Drug use: No    Sexual activity: Not Currently     Partners: Female   Lifestyle    Physical activity     Days per week: Not on file     Minutes per session: Not on file    Stress: Not on file   Relationships    Social connections     Talks on phone: Not on file     Gets together: Not on file     Attends [employer] service: Not on file     Active member of club or organization: Not on file     Attends meetings of clubs or organizations: Not on file     Relationship status: Not on file   Other Topics Concern    Not on file   Social History Narrative    Not on file       Current Outpatient Medications:     allopurinoL (ZYLOPRIM) 300 MG tablet, Take 1 tablet (300 mg total) by mouth once daily., Disp: 30 tablet, Rfl: 3    aspirin (ECOTRIN) 81 MG EC tablet, Take 81 mg by mouth once daily., Disp: , Rfl:     carvediloL (COREG) 3.125 MG tablet, TAKE 1 TABLET(3.125 MG) BY MOUTH TWICE DAILY, Disp: 60 tablet, Rfl: 1    coenzyme Q10 100 mg capsule, Take 100 mg by mouth every morning., Disp: , Rfl:     collagenase (SANTYL) ointment, Apply topically once daily., Disp: 90 g, Rfl: 0    dorzolamide-timolol 2-0.5% (COSOPT) 22.3-6.8 mg/mL ophthalmic solution, Place 1 drop into both eyes 3 (three) times daily., Disp: 1 Bottle, Rfl: 11    ezetimibe (ZETIA) 10 mg tablet, TAKE 1 TABLET(10 MG) BY MOUTH EVERY DAY, Disp: 90 tablet, Rfl: 0    fish oil-omega-3 fatty acids 300-1,000 mg capsule, Take 1 capsule by mouth 2 (two) times daily., Disp: 60 capsule, Rfl: 3    gabapentin (NEURONTIN) 100 MG capsule, TAKE 2 CAPSULES(200 MG) BY MOUTH EVERY EVENING, Disp: 60 capsule, Rfl: 1    insulin (LANTUS SOLOSTAR U-100 INSULIN) glargine 100 units/mL (3mL) SubQ pen, Inject 35 units once daily, Disp: 15 mL, Rfl: 3    insulin degludec-liraglutide (XULTOPHY 100/3.6) 100 unit-3.6 mg /mL (3 mL) InPn, Inject 35 Units into the skin once daily., Disp: ,  "Rfl:     liraglutide 0.6 mg/0.1 mL, 18 mg/3 mL, subq PNIJ (VICTOZA 3-MEMO) 0.6 mg/0.1 mL (18 mg/3 mL) PnIj pen, Inject 1.8 mg into the skin once daily., Disp: 9 mL, Rfl: 3    MULTIVIT,THER IRON,CA,FA & MIN (MULTIVITAMIN) Tab, Take 1 tablet by mouth every morning. , Disp: , Rfl:     bisacodyl (DULCOLAX) 5 mg EC tablet, Take 5 mg by mouth daily as needed for Constipation., Disp: , Rfl:     brimonidine 0.2% (ALPHAGAN) 0.2 % Drop, Place 1 drop into both eyes 3 (three) times daily. (Patient not taking: Reported on 10/26/2020), Disp: 10 mL, Rfl: 11    HYDROcodone-acetaminophen (NORCO) 7.5-325 mg per tablet, Take 1 tablet by mouth every 8 (eight) hours as needed for Pain. (Patient not taking: Reported on 10/26/2020), Disp: 30 tablet, Rfl: 0    pen needle, diabetic (BD ULTRA-FINE AMADOR PEN NEEDLE) 32 gauge x 5/32" Ndle, 1 Device by Misc.(Non-Drug; Combo Route) route 2 (two) times a day. (Patient not taking: Reported on 10/26/2020), Disp: 100 each, Rfl: 11    torsemide (DEMADEX) 20 MG Tab, Take 4 tablets (80 mg total) by mouth 2 (two) times daily., Disp: 240 tablet, Rfl: 2  No current facility-administered medications for this visit.     Facility-Administered Medications Ordered in Other Visits:     clindamycin 900 MG/50 ML D5W 900 mg/50 mL IVPB 900 mg, 900 mg, Intravenous, Q8H, Mitch Chan MD, 900 mg at 06/05/19 1407    lactated ringers infusion, , Intravenous, Continuous, Tam Reynolds MD    lidocaine (PF) 10 mg/ml (1%) injection 10 mg, 1 mL, Intradermal, Once, Tam Reynolds MD    REVIEW OF SYSTEMS:  General: No fevers or chills; ENT: No sore throat; Allergy and Immunology: no persistent infections; Hematological and Lymphatic: No history of bleeding or easy bruising; Endocrine: negative; Respiratory: no cough, shortness of breath, or wheezing; Cardiovascular: no chest pain or dyspnea on exertion; Gastrointestinal: no abdominal pain/back, change in bowel habits, or bloody stools; Genito-Urinary: no " dysuria, trouble voiding, or hematuria; Musculoskeletal: negative; Neurological: no TIA or stroke symptoms; Psychiatric: no nervousness, anxiety or depression.    PHYSICAL EXAM:                General appearance:  Alert, well-appearing, and in no distress.  Oriented to person, place, and time                    Neurological: Normal speech, no focal findings noted; CN II - XII grossly intact. RLE with sensation to light touch, LLE with sensation to light touch.            Musculoskeletal: Digits/nail without cyanosis/clubbing.  Strength 5/5 BLE.                    Neck: Supple, no significant adenopathy                  Chest:  Clear to auscultation, no wheezes, rales or rhonchi, symmetric air entry. No use of accessory muscles               Cardiac: Normal rate and regular rhythm, S1 and S2 normal            Abdomen: Soft, nontender, nondistended, no masses or organomegaly, no hernia     No rebound tenderness noted; bowel sounds normal     No groin adenopathy      Extremities:   2+ R femoral pulse, 2+ L femoral pulse     doppler+ R popliteal pulse, doppler+ L popliteal pulse     doppler+ R PT pulse, doppler+ L PT pulse     doppler+ R DP pulse, doppler+ L DP pulse     1+ RLE edema, 2+ LLE edema    Skin: RLE with medial ankle, and lower foot medial wounds, bleeding present, no infection or drainage; large calcaneal wound with eschar, no drainage, min granulation tissue; LLE with minimal brawny edema, improved/smaller foot wound with markedly improved granulation tissue, no odor, no purulence or erythema    LAB RESULTS:  No results found for: CBC  Lab Results   Component Value Date    LABPROT 11.2 05/31/2019    INR 1.1 05/31/2019     Lab Results   Component Value Date     10/13/2020    K 4.5 10/13/2020     10/13/2020    CO2 30 (H) 10/13/2020     (H) 10/13/2020    BUN 46 (H) 10/13/2020    CREATININE 1.3 10/13/2020    CALCIUM 8.1 (L) 10/13/2020    ANIONGAP 9 10/13/2020    EGFRNONAA 58 (A) 10/13/2020      Lab Results   Component Value Date    WBC 8.20 09/29/2020    RBC 4.31 (L) 09/29/2020    HGB 12.3 (L) 09/29/2020    HCT 39.8 (L) 09/29/2020    MCV 92 09/29/2020    MCH 28.5 09/29/2020    MCHC 30.9 (L) 09/29/2020    RDW 17.4 (H) 09/29/2020     09/29/2020    MPV 9.1 (L) 09/29/2020    GRAN 6.5 09/29/2020    GRAN 79.0 (H) 09/29/2020    LYMPH 0.5 (L) 09/29/2020    LYMPH 5.7 (L) 09/29/2020    MONO 0.8 09/29/2020    MONO 9.4 09/29/2020    EOS 0.4 09/29/2020    BASO 0.08 09/29/2020    EOSINOPHIL 4.4 09/29/2020    BASOPHIL 1.0 09/29/2020    DIFFMETHOD Automated 09/29/2020     .  Lab Results   Component Value Date    HGBA1C 8.7 (H) 09/04/2020       IMAGING:  All pertinent imaging has been reviewed and interpreted independently.    BLE arterial US 1/2019: No focal stenosis    5/16/19: BLE with no HD significant stenosis, SIDRA 0.8/1.46, left ankle pressures 60 and 171    7/29/19: BLE arterial US with approx 50% R TPT stenosis, SIDRA 0.8; LLE showing no HD significant stenosis, SIDRA 0.66    11/4/19: SIDRA 0.4/0.4, DP vessel NC bilaterally, R toe waveform normal  Right lower extremity arterial ultrasound shows no hemodynamically significant stenosis.  Pressures indicate moderate arterial occlusive disease.  Left lower extremity arterial ultrasound shows no hemodynamically significant stenosis.  Pressures indicate moderate arterial occlusive disease.    5/2020:  1. Right lower extremity pressures and waveforms indicate moderate arterial occlusive disease.  2. Left lower extremity pressures and waveforms indicate no hemodynamically significant arterial occlusive disease.    7/2020:  1. Right lower extremity arterial ultrasound shows approximately 50% TP trunk stenosis and an occluded PT artery.  There is no hemodynamically significant stenosis.  Pressures indicate severe arterial occlusive disease.  2. Left lower extremity arterial ultrasound shows TP trunk hemodynamically significant stenosis and an occluded PT artery.   Pressures indicate severe arterial occlusive disease.    1. Right lower extremity pressures and waveforms indicate moderate to severe arterial occlusive disease.  2. Left lower extremity pressures and waveforms indicate moderate to severe arterial occlusive disease.    10/2020 RLE arterial US showing right TPT stenosis.    IMP/PLAN:  60 y.o. male with   Patient Active Problem List   Diagnosis    Epiretinal membrane, both eyes    Nuclear sclerotic cataract of right eye    Pseudophakia, left eye    Ptosis, left eyelid    DM type 2 with diabetic peripheral neuropathy    HLD (hyperlipidemia)    Neovascular glaucoma of both eyes    Tophaceous gout    Essential hypertension    Coronary artery disease involving native coronary artery of native heart without angina pectoris    Neovascular glaucoma of left eye, severe stage    Statin myopathy    Neovascular glaucoma, right eye, mild stage    PVD (peripheral vascular disease)    Right wrist pain    Hepatosplenomegaly    Type 2 diabetes mellitus with left diabetic foot ulcer    Other ascites    MDR Acinetobacter baumannii infection    Debility    Subacute osteomyelitis of left foot    Stage 3 chronic kidney disease    Atherosclerosis of left lower extremity with ulceration of midfoot    CKD stage 3 due to type 2 diabetes mellitus    Anemia due to multiple mechanisms    Persistent proteinuria    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    -Improving L  foot wound improved s/p debridement 2/13/19, multifactorial due to diabetes, PVD and venous insufficiency with improvement in granulation tissue on Abx due to multidrug resistent bacteria in the past s/p debridements and L tibial angioplasty x 2 with improvement in wound +granulation tissue s/p debridement 6/5/19 and [place] placement; anticipate healing soon  -New R heel wound s/p R SFA angioplasty 10/6/20 - rec debridement in OR 10/30/20 and cont monitoring RLE PVD; if poor bleeding in OR rec tibial revascularization  -Cont ID rec Abx regimen  -Recommend continued wound care center care - seeing Dr. Davis; may benefit from hyperbaric O2 therapy  -Rec BLE Xeroform and dry kerlix wraps twice daily to shin regions with elevation of LLE above level of the heart  -Low sodium diet  -Strict glycemic control    I spent 11 minutes evaluating this patient and greater than 50% of the time was spent counseling, coordinator care and discussing the plan of care.  All questions were answered and patient stated understanding with agreement with the above treatment plan.    Mitch Chan MD [facility]  Vascular and Endovascular Surgery

## 2020-10-26 NOTE — PROGRESS NOTES
Mitch Chan MD [facility]                         10/26/2020    HPI:  Marvin Ray is a 60 y.o. male with   Patient Active Problem List   Diagnosis    Epiretinal membrane, both eyes    Nuclear sclerotic cataract of right eye    Pseudophakia, left eye    Ptosis, left eyelid    DM type 2 with diabetic peripheral neuropathy    HLD (hyperlipidemia)    Neovascular glaucoma of both eyes    Tophaceous gout    Essential hypertension    Coronary artery disease involving native coronary artery of native heart without angina pectoris    Neovascular glaucoma of left eye, severe stage    Statin myopathy    Neovascular glaucoma, right eye, mild stage    PVD (peripheral vascular disease)    Right wrist pain    Hepatosplenomegaly    Type 2 diabetes mellitus with left diabetic foot ulcer    Other ascites    MDR Acinetobacter baumannii infection    Debility    Subacute osteomyelitis of left foot    Stage 3 chronic kidney disease    Atherosclerosis of left lower extremity with ulceration of midfoot    CKD stage 3 due to type 2 diabetes mellitus    Anemia due to multiple mechanisms    Persistent proteinuria    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  Seen in hospital last mo with LLE cellulitis.  Treated with Abx.  Also had paracentesis for ascites with negative w/u per family.  Patient states location is L foot occurring for 1-2 mo, worsening  foot wound although improvement in edema and erythema.  Associated signs and symptoms include pain and edema.  Quality is aching and severity is 5/10.  Symptoms began 10/2018 spontaneously with blistering and severe edema.  Alleviating factors include wound care and elevation.  Worsening factors include pressure.    Tobacco use: denies    1/4/19: s/p LLE angioplasty and multiple subsequent OR and bedside debridements.  On IV Abx per culture results.  Glucose better controlled.  No fevers.  Receiving local wound care with Santyl.    1/14/19:  Cont to receive local wound care.  Pseudomonas in tissue and bone cultures multidrug resistant other than aminoglycoside; d/w Dr. Velez with high risk of ESRD with 6 weeks of aminoglycoside treatment.  No F/C.    1/31/19:  Pt without complaints.  Was started back on Bactrim.  Family doing wound care.    2/28/19: S/p L foot debridement 2/13/19.  Started on Meropenem.    3/28/19:  No complaints.  S/p paracentesis and pt feels better.    5/16/19:  S/p L peroneal and AT angioplasty, wound healing well.    9/30/19:  S/p L foot debridement 6/5/19. WV placed.    11/2019:  States wound is healing well.  Did not see Plastic Surgery clinic.  Cont wound care center and home health wound care.    2/2020: States wound nearly fully healed.  Receiving wound care daily.    5/2020:  Wounds healing.  Seeing Dr. Davis next week.    7/2020:  Doing well with L foot wound, has developed a R medial maleolus wound due to trauma    8/2020:  Wounds healing BLE.    9/2020: RLE wounds not healing well.  No F/C.    10/2020:  S/p R SFA angioplasty with a 5x60 mm Ultraverse balloon 10/6/20.  States wounds look better, receiving daily wound care.    Past Medical History:   Diagnosis Date    Arthritis     Cellulitis     CKD (chronic kidney disease), stage III     Coronary artery disease     Diabetes mellitus     Diabetic retinopathy     Diabetic ulcer of left foot     Glaucoma     Gout     Hyperlipemia      Hypertension     ICD (implantable cardioverter-defibrillator) in place 11/02/2018    Left chest    Non-pressure chronic ulcer of other part of left foot with fat layer exposed 10/23/2018    PVD (peripheral vascular disease)     Type 2 diabetes mellitus with left diabetic foot ulcer 10/29/2018    Unsteady gait     uses a wheelchair     Past Surgical History:   Procedure Laterality Date    AHMED GLAUCOMA IMPLANT Left 2011    DONE AT [facility]    AORTOGRAPHY WITH SERIALOGRAPHY Left 4/30/2019    Procedure: AORTOGRAM, WITH SERIALOGRAPHY, LEFT LOWER EXTREMITY, POSSIBLE INTERVENTION;  Surgeon: Mitch Chan MD;  Location: [facility] OR;  Service: Vascular;  Laterality: Left;  RN PRE OP 4-23-19.  NO H & P, No Cosent  [place]    AORTOGRAPHY WITH SERIALOGRAPHY Right 10/6/2020    Procedure: AORTOGRAM, WITH SERIALOGRAPHY, RIGHT LOWER EXTREMITY ANGIOGRAM, POSSIBLE INTERVENTION;  Surgeon: Mitch Chan MD;  Location: [facility] OR;  Service: Vascular;  Laterality: Right;  RN PREOP 10/1/2020--COVID NEGATIVE ON 10/5    BAERVELDT GLAUCOMA IMPLANT Left 2012    WITH CATARACT EXTRACTION//DONE AT [facility]    CARDIAC CATHETERIZATION Left 05/2016    CARDIAC DEFIBRILLATOR PLACEMENT Left 11/02/2018    CATARACT EXTRACTION W/  INTRAOCULAR LENS IMPLANT Left 2012    WITH BAERVEDT//DONE AT [facility]    CATARACT EXTRACTION W/  INTRAOCULAR LENS IMPLANT Right 09/26/2018    COMPLEX ()    CB DESTRUCTION WITH CYCLO G6 Left 02/15/2017        CYST REMOVAL      DEBRIDEMENT OF FOOT  12/28/2018    Procedure: DEBRIDEMENT, FOOT;  Surgeon: Mitch Chan MD;  Location: [facility] OR;  Service: Vascular;;    DEBRIDEMENT OF FOOT  6/5/2019    Procedure: DEBRIDEMENT, FOOT;  Surgeon: Mitch Chan MD;  Location: [facility] OR;  Service: Vascular;;    EXAMINATION UNDER ANESTHESIA Left 12/5/2018    Procedure: Exam under anesthesia, left foot debridement, washout and all other indicated procedures;  Surgeon: Mitch BAINS  MD Dustin;  Location: [facility] OR;  Service: Vascular;  Laterality: Left;  1030AM START  RN PREOP 12/3/2018-----AICD  SEEN BY DR JAMES 12/4    EXAMINATION UNDER ANESTHESIA Left 2/13/2019    Procedure: LEFT FOOT WOUND DEBRIDEMENT, WASHOUT AND ALL OTHER INDICATED PROCEDURES;  Surgeon: Mitch Wall MD;  Location: [facility] OR;  Service: Vascular;  Laterality: Left;  requesting 1st case start  THIS CASE 1ST PER DR WALL ON 2/1/19 @ 844AM -[place]  RN PREOP 2/6/2019  HAS CARDIAC CLEARANCE    EXAMINATION UNDER ANESTHESIA Left 12/28/2018    Procedure: Exam under anesthesia, left foot debridement, left foot washout, possible left second through fifth metatarsal resection;  Surgeon: Mitch Wall MD;  Location: [facility] OR;  Service: Vascular;  Laterality: Left;  1:00pm start per julissa @ 8:58am  RN  PHONE PRE OP 12-27-18--=[facility]  NEED CONSENT    EXAMINATION UNDER ANESTHESIA Left 6/5/2019    Procedure: LEFT FOOT DEBRIDEMENT, WASHOUT AND ALL OTHER INDICATED PROCEDURES;  Surgeon: Mitch Wall MD;  Location: [facility] OR;  Service: Vascular;  Laterality: Left;  RN PRE OP 5-31-19------ICD------NEED CONSENT    INCISION AND DRAINAGE Left 11/14/2018    Procedure: Incision and Drainage, left lower extremity debridement, washout;  Surgeon: Mitch Wall MD;  Location: [facility] OR;  Service: Vascular;  Laterality: Left;    INCISION AND DRAINAGE Left 11/16/2018    Procedure: INCISION AND DRAINAGE;  Surgeon: Mitch Wall MD;  Location: [facility] OR;  Service: Vascular;  Laterality: Left;    INTRAOCULAR PROSTHESES INSERTION Right 9/26/2018    Procedure: INSERTION, IOL PROSTHESIS;  Surgeon: Perla Cortés MD;  Location: [facility] OR [address] [place] [address];  Service: Ophthalmology;  Laterality: Right;    PERITONEOCENTESIS N/A 3/1/2019    Procedure: PARACENTESIS, ABDOMINAL, INITIAL;  Surgeon: [facility] Diagnostic Provider;  Location: [facility] OR;  Service: Radiology;  Laterality: N/A;    PERITONEOCENTESIS N/A  3/25/2019    Procedure: PARACENTESIS, ABDOMINAL, INITIAL;  Surgeon: Dosc Diagnostic Provider;  [facility];  Service: Radiology;  Laterality: N/A;    PERITONEOCENTESIS N/A 7/22/2019    Procedure: PARACENTESIS, ABDOMINAL, INITIAL;  Surgeon: Dosc Diagnostic Provider;  Location: [facility];  Service: Radiology;  Laterality: N/A;    PERITONEOCENTESIS N/A 8/16/2019    Procedure: PARACENTESIS, ABDOMINAL, INITIAL;  Surgeon: Dosc Diagnostic Provider;  [facility];  Service: Radiology;  Laterality: N/A;    PERITONEOCENTESIS N/A 9/26/2019    Procedure: PARACENTESIS, ABDOMINAL;  Surgeon: Dosc Diagnostic Provider;  [facility];  Service: Radiology;  Laterality: N/A;    PERITONEOCENTESIS N/A 10/11/2019    Procedure: PARACENTESIS, ABDOMINAL;  Surgeon: Dosc Diagnostic Provider;  [facility];  Service: Radiology;  Laterality: N/A;    PERITONEOCENTESIS N/A 10/31/2019    Procedure: PARACENTESIS, ABDOMINAL;  Surgeon: Dosc Diagnostic Provider;  [facility];  Service: Radiology;  Laterality: N/A;    PERITONEOCENTESIS N/A 11/18/2019    Procedure: PARACENTESIS, ABDOMINAL;  Surgeon: Dosc Diagnostic Provider;  [facility];  Service: Radiology;  Laterality: N/A;    PERITONEOCENTESIS N/A 12/16/2019    Procedure: PARACENTESIS, ABDOMINAL;  Surgeon: Dosc Diagnostic Provider;  [facility];  Service: Radiology;  Laterality: N/A;  2PM START    PERITONEOCENTESIS N/A 2/7/2020    Procedure: PARACENTESIS, ABDOMINAL;  Surgeon: Dosc Diagnostic Provider;  [facility];  Service: Radiology;  Laterality: N/A;  REQUESTED 10:30A START    PERITONEOCENTESIS N/A 2/19/2020    Procedure: PARACENTESIS, ABDOMINAL;  Surgeon: Dosc Diagnostic Provider;  [facility];  Service: Radiology;  Laterality: N/A;    PERITONEOCENTESIS N/A 2/28/2020    Procedure: PARACENTESIS, ABDOMINAL;  Surgeon: Dosc Diagnostic Provider;  Location: [facility] OR;  Service: Radiology;  Laterality: N/A;  REQUESTED 10AM START     PHACOEMULSIFICATION OF CATARACT Right 9/26/2018    Procedure: PHACOEMULSIFICATION, CATARACT;  Surgeon: Perla Cortés MD;  Location: [facility] OR [address] [place] [address];  Service: Ophthalmology;  Laterality: Right;    REPEAT ABDOMINAL PARACENTESIS N/A 2/11/2019    Procedure: PARACENTESIS, ABDOMINAL, REPEAT;  Surgeon: Dosc Diagnostic Provider;  [facility];  Service: Radiology;  Laterality: N/A;  1PM START    REPEAT ABDOMINAL PARACENTESIS N/A 9/12/2019    Procedure: PARACENTESIS, ABDOMINAL, REPEAT;  Surgeon: Dosc Diagnostic Provider;  [facility];  Service: Radiology;  Laterality: N/A;  9AM START    REPEAT ABDOMINAL PARACENTESIS N/A 12/2/2019    Procedure: PARACENTESIS, ABDOMINAL, REPEAT;  Surgeon: Dosc Diagnostic Provider;  [facility];  Service: Radiology;  Laterality: N/A;  3PM START    REPEAT ABDOMINAL PARACENTESIS N/A 1/10/2020    Procedure: PARACENTESIS, ABDOMINAL, REPEAT;  Surgeon: Dosc Diagnostic Provider;  [facility];  Service: Radiology;  Laterality: N/A;  1130AM START    REPEAT ABDOMINAL PARACENTESIS N/A 1/20/2020    Procedure: PARACENTESIS, ABDOMINAL, REPEAT;  Surgeon: Dosc Diagnostic Provider;  [facility];  Service: Radiology;  Laterality: N/A;  1PM START    REPEAT ABDOMINAL PARACENTESIS N/A 1/31/2020    Procedure: PARACENTESIS, ABDOMINAL, REPEAT;  Surgeon: Dosc Diagnostic Provider;  [facility];  Service: Radiology;  Laterality: N/A;  10AM START    REPEAT ABDOMINAL PARACENTESIS N/A 3/16/2020    Procedure: PARACENTESIS, ABDOMINAL, REPEAT;  Surgeon: Dosc Diagnostic Provider;  [facility];  Service: Radiology;  Laterality: N/A;  10AM START    REPEAT ABDOMINAL PARACENTESIS N/A 3/30/2020    Procedure: PARACENTESIS, ABDOMINAL, REPEAT;  Surgeon: Dosc Diagnostic Provider;  [facility];  Service: Radiology;  Laterality: N/A;    REPEAT ABDOMINAL PARACENTESIS N/A 4/9/2020    Procedure: PARACENTESIS, ABDOMINAL, REPEAT;  Surgeon: Dosc Diagnostic Provider;  [facility];  Service: Radiology;  Laterality: N/A;  1130AM START    REPEAT ABDOMINAL PARACENTESIS N/A 5/8/2020    Procedure: PARACENTESIS, ABDOMINAL, REPEAT;  Surgeon: Dosc Diagnostic Provider;  [facility];  Service: Radiology;  Laterality: N/A;  9AM START    REPEAT ABDOMINAL PARACENTESIS N/A 5/21/2020    Procedure: PARACENTESIS, ABDOMINAL, REPEAT;  Surgeon: Dosc Diagnostic Provider;  [facility];  Service: Radiology;  Laterality: N/A;  10AM START    REPEAT ABDOMINAL PARACENTESIS N/A 6/5/2020    Procedure: PARACENTESIS, ABDOMINAL, REPEAT;  Surgeon: Dosc Diagnostic Provider;  [facility];  Service: Radiology;  Laterality: N/A;  NOON START    REPEAT ABDOMINAL PARACENTESIS N/A 7/10/2020    Procedure: PARACENTESIS, ABDOMINAL, REPEAT;  Surgeon: Dosc Diagnostic Provider;  [facility];  Service: Radiology;  Laterality: N/A;  1PM START    REPEAT ABDOMINAL PARACENTESIS N/A 8/20/2020    Procedure: PARACENTESIS, ABDOMINAL, REPEAT;  Surgeon: Dosc Diagnostic Provider;  [facility];  Service: Radiology;  Laterality: N/A;  1PM START    REPEAT ABDOMINAL PARACENTESIS N/A 9/4/2020    Procedure: PARACENTESIS, ABDOMINAL, REPEAT;  Surgeon: Dosc Diagnostic Provider;  Location: [facility] OR;  Service: Radiology;  Laterality: N/A;  11 AM START    REPEAT ABDOMINAL PARACENTESIS N/A 9/16/2020    Procedure: PARACENTESIS, ABDOMINAL, REPEAT;  Surgeon: Dosc Diagnostic Provider;  Location: [facility] OR;  Service: Radiology;  Laterality: N/A;  START 2:00 P.M.    REPEAT ABDOMINAL PARACENTESIS N/A 9/28/2020    Procedure: PARACENTESIS, ABDOMINAL, REPEAT;  Surgeon: Dosc Diagnostic Provider;  Location: [facility] OR;  Service: Radiology;  Laterality: N/A;  1 P.M. START    REVISION OF PROCEDURE INVOLVING GLAUCOMA DRAINAGE DEVICE Left 10/2/2019    EXTRUDING BAERVELDT /WITH PERICARDIAL PATCH GRAFT ()    Right foot surgery  10/2014    TOE AMPUTATION Right     first and second    TOE AMPUTATION Left 11/16/2018    Procedure:  AMPUTATION, TOES  2-5;  Surgeon: Mitch Chan MD;  Location: [facility] OR;  Service: Vascular;  Laterality: Left;    TOE AMPUTATION Left 2018    Procedure: AMPUTATION, TOE;  Surgeon: Mitch Chan MD;  Location: [facility] OR;  Service: Vascular;  Laterality: Left;  left great toe    TONSILLECTOMY       Family History   Problem Relation Age of Onset    Diabetes Mother     Heart disease Brother     No Known Problems Father     No Known Problems Sister     No Known Problems Maternal Aunt     No Known Problems Maternal Uncle     No Known Problems Paternal Aunt     No Known Problems Paternal Uncle     No Known Problems Maternal Grandmother     No Known Problems Maternal Grandfather     No Known Problems Paternal Grandmother     No Known Problems Paternal Grandfather     Anemia Neg Hx     Arrhythmia Neg Hx     Asthma Neg Hx     Clotting disorder Neg Hx     Fainting Neg Hx     Heart attack Neg Hx     Heart failure Neg Hx     Hyperlipidemia Neg Hx     Hypertension Neg Hx     Stroke Neg Hx     Atrial Septal Defect Neg Hx     Amblyopia Neg Hx     Blindness Neg Hx     Glaucoma Neg Hx     Macular degeneration Neg Hx     Retinal detachment Neg Hx     Strabismus Neg Hx      Social History     Socioeconomic History    Marital status: Single     Spouse name: Not on file    Number of children: Not on file    Years of education: 14    Highest education level: Not on file   Occupational History    Not on file   Social Needs    Financial resource strain: Not on file    Food insecurity     Worry: Not on file     Inability: Not on file    Transportation needs     Medical: Not on file     Non-medical: Not on file   Tobacco Use    Smoking status: Former Smoker     Packs/day: 1.00     Years: 3.00     Pack years: 3.00     Types: Cigarettes     Quit date: 1984     Years since quittin.3    Smokeless tobacco: Never Used   Substance and Sexual Activity    Alcohol use: Not Currently      Alcohol/week: 1.0 standard drinks     Types: 1 Cans of beer per week     Comment: rarely    Drug use: No    Sexual activity: Not Currently     Partners: Female   Lifestyle    Physical activity     Days per week: Not on file     Minutes per session: Not on file    Stress: Not on file   Relationships    Social connections     Talks on phone: Not on file     Gets together: Not on file     Attends Jew service: Not on file     Active member of club or organization: Not on file     Attends meetings of clubs or organizations: Not on file     Relationship status: Not on file   Other Topics Concern    Not on file   Social History Narrative    Not on file       Current Outpatient Medications:     allopurinoL (ZYLOPRIM) 300 MG tablet, Take 1 tablet (300 mg total) by mouth once daily., Disp: 30 tablet, Rfl: 3    aspirin (ECOTRIN) 81 MG EC tablet, Take 81 mg by mouth once daily., Disp: , Rfl:     carvediloL (COREG) 3.125 MG tablet, TAKE 1 TABLET(3.125 MG) BY MOUTH TWICE DAILY, Disp: 60 tablet, Rfl: 1    coenzyme Q10 100 mg capsule, Take 100 mg by mouth every morning., Disp: , Rfl:     collagenase (SANTYL) ointment, Apply topically once daily., Disp: 90 g, Rfl: 0    dorzolamide-timolol 2-0.5% (COSOPT) 22.3-6.8 mg/mL ophthalmic solution, Place 1 drop into both eyes 3 (three) times daily., Disp: 1 Bottle, Rfl: 11    ezetimibe (ZETIA) 10 mg tablet, TAKE 1 TABLET(10 MG) BY MOUTH EVERY DAY, Disp: 90 tablet, Rfl: 0    fish oil-omega-3 fatty acids 300-1,000 mg capsule, Take 1 capsule by mouth 2 (two) times daily., Disp: 60 capsule, Rfl: 3    gabapentin (NEURONTIN) 100 MG capsule, TAKE 2 CAPSULES(200 MG) BY MOUTH EVERY EVENING, Disp: 60 capsule, Rfl: 1    insulin (LANTUS SOLOSTAR U-100 INSULIN) glargine 100 units/mL (3mL) SubQ pen, Inject 35 units once daily, Disp: 15 mL, Rfl: 3    insulin degludec-liraglutide (XULTOPHY 100/3.6) 100 unit-3.6 mg /mL (3 mL) InPn, Inject 35 Units into the skin once daily., Disp: ,  "Rfl:     liraglutide 0.6 mg/0.1 mL, 18 mg/3 mL, subq PNIJ (VICTOZA 3-MEMO) 0.6 mg/0.1 mL (18 mg/3 mL) PnIj pen, Inject 1.8 mg into the skin once daily., Disp: 9 mL, Rfl: 3    MULTIVIT,THER IRON,CA,FA & MIN (MULTIVITAMIN) Tab, Take 1 tablet by mouth every morning. , Disp: , Rfl:     bisacodyl (DULCOLAX) 5 mg EC tablet, Take 5 mg by mouth daily as needed for Constipation., Disp: , Rfl:     brimonidine 0.2% (ALPHAGAN) 0.2 % Drop, Place 1 drop into both eyes 3 (three) times daily. (Patient not taking: Reported on 10/26/2020), Disp: 10 mL, Rfl: 11    HYDROcodone-acetaminophen (NORCO) 7.5-325 mg per tablet, Take 1 tablet by mouth every 8 (eight) hours as needed for Pain. (Patient not taking: Reported on 10/26/2020), Disp: 30 tablet, Rfl: 0    pen needle, diabetic (BD ULTRA-FINE AMADOR PEN NEEDLE) 32 gauge x 5/32" Ndle, 1 Device by Misc.(Non-Drug; Combo Route) route 2 (two) times a day. (Patient not taking: Reported on 10/26/2020), Disp: 100 each, Rfl: 11    torsemide (DEMADEX) 20 MG Tab, Take 4 tablets (80 mg total) by mouth 2 (two) times daily., Disp: 240 tablet, Rfl: 2  No current facility-administered medications for this visit.     Facility-Administered Medications Ordered in Other Visits:     clindamycin 900 MG/50 ML D5W 900 mg/50 mL IVPB 900 mg, 900 mg, Intravenous, Q8H, iMtch Chan MD, 900 mg at 06/05/19 1407    lactated ringers infusion, , Intravenous, Continuous, Tam Reynolds MD    lidocaine (PF) 10 mg/ml (1%) injection 10 mg, 1 mL, Intradermal, Once, Tam Reynolds MD    REVIEW OF SYSTEMS:  General: No fevers or chills; ENT: No sore throat; Allergy and Immunology: no persistent infections; Hematological and Lymphatic: No history of bleeding or easy bruising; Endocrine: negative; Respiratory: no cough, shortness of breath, or wheezing; Cardiovascular: no chest pain or dyspnea on exertion; Gastrointestinal: no abdominal pain/back, change in bowel habits, or bloody stools; Genito-Urinary: no " dysuria, trouble voiding, or hematuria; Musculoskeletal: negative; Neurological: no TIA or stroke symptoms; Psychiatric: no nervousness, anxiety or depression.    PHYSICAL EXAM:                General appearance:  Alert, well-appearing, and in no distress.  Oriented to person, place, and time                    Neurological: Normal speech, no focal findings noted; CN II - XII grossly intact. RLE with sensation to light touch, LLE with sensation to light touch.            Musculoskeletal: Digits/nail without cyanosis/clubbing.  Strength 5/5 BLE.                    Neck: Supple, no significant adenopathy                  Chest:  Clear to auscultation, no wheezes, rales or rhonchi, symmetric air entry. No use of accessory muscles               Cardiac: Normal rate and regular rhythm, S1 and S2 normal            Abdomen: Soft, nontender, nondistended, no masses or organomegaly, no hernia     No rebound tenderness noted; bowel sounds normal     No groin adenopathy      Extremities:   2+ R femoral pulse, 2+ L femoral pulse     doppler+ R popliteal pulse, doppler+ L popliteal pulse     doppler+ R PT pulse, doppler+ L PT pulse     doppler+ R DP pulse, doppler+ L DP pulse     1+ RLE edema, 2+ LLE edema    Skin: RLE with medial ankle, and lower foot medial wounds, bleeding present, no infection or drainage; large calcaneal wound with eschar, no drainage, min granulation tissue; LLE with minimal brawny edema, improved/smaller foot wound with markedly improved granulation tissue, no odor, no purulence or erythema    LAB RESULTS:  No results found for: CBC  Lab Results   Component Value Date    LABPROT 11.2 05/31/2019    INR 1.1 05/31/2019     Lab Results   Component Value Date     10/13/2020    K 4.5 10/13/2020     10/13/2020    CO2 30 (H) 10/13/2020     (H) 10/13/2020    BUN 46 (H) 10/13/2020    CREATININE 1.3 10/13/2020    CALCIUM 8.1 (L) 10/13/2020    ANIONGAP 9 10/13/2020    EGFRNONAA 58 (A) 10/13/2020      Lab Results   Component Value Date    WBC 8.20 09/29/2020    RBC 4.31 (L) 09/29/2020    HGB 12.3 (L) 09/29/2020    HCT 39.8 (L) 09/29/2020    MCV 92 09/29/2020    MCH 28.5 09/29/2020    MCHC 30.9 (L) 09/29/2020    RDW 17.4 (H) 09/29/2020     09/29/2020    MPV 9.1 (L) 09/29/2020    GRAN 6.5 09/29/2020    GRAN 79.0 (H) 09/29/2020    LYMPH 0.5 (L) 09/29/2020    LYMPH 5.7 (L) 09/29/2020    MONO 0.8 09/29/2020    MONO 9.4 09/29/2020    EOS 0.4 09/29/2020    BASO 0.08 09/29/2020    EOSINOPHIL 4.4 09/29/2020    BASOPHIL 1.0 09/29/2020    DIFFMETHOD Automated 09/29/2020     .  Lab Results   Component Value Date    HGBA1C 8.7 (H) 09/04/2020       IMAGING:  All pertinent imaging has been reviewed and interpreted independently.    BLE arterial US 1/2019: No focal stenosis    5/16/19: BLE with no HD significant stenosis, SIDRA 0.8/1.46, left ankle pressures 60 and 171    7/29/19: BLE arterial US with approx 50% R TPT stenosis, SIDRA 0.8; LLE showing no HD significant stenosis, SIDRA 0.66    11/4/19: SIDRA 0.4/0.4, DP vessel NC bilaterally, R toe waveform normal  Right lower extremity arterial ultrasound shows no hemodynamically significant stenosis.  Pressures indicate moderate arterial occlusive disease.  Left lower extremity arterial ultrasound shows no hemodynamically significant stenosis.  Pressures indicate moderate arterial occlusive disease.    5/2020:  1. Right lower extremity pressures and waveforms indicate moderate arterial occlusive disease.  2. Left lower extremity pressures and waveforms indicate no hemodynamically significant arterial occlusive disease.    7/2020:  1. Right lower extremity arterial ultrasound shows approximately 50% TP trunk stenosis and an occluded PT artery.  There is no hemodynamically significant stenosis.  Pressures indicate severe arterial occlusive disease.  2. Left lower extremity arterial ultrasound shows TP trunk hemodynamically significant stenosis and an occluded PT artery.   Pressures indicate severe arterial occlusive disease.    1. Right lower extremity pressures and waveforms indicate moderate to severe arterial occlusive disease.  2. Left lower extremity pressures and waveforms indicate moderate to severe arterial occlusive disease.    10/2020 RLE arterial US showing right TPT stenosis.    IMP/PLAN:  60 y.o. male with   Patient Active Problem List   Diagnosis    Epiretinal membrane, both eyes    Nuclear sclerotic cataract of right eye    Pseudophakia, left eye    Ptosis, left eyelid    DM type 2 with diabetic peripheral neuropathy    HLD (hyperlipidemia)    Neovascular glaucoma of both eyes    Tophaceous gout    Essential hypertension    Coronary artery disease involving native coronary artery of native heart without angina pectoris    Neovascular glaucoma of left eye, severe stage    Statin myopathy    Neovascular glaucoma, right eye, mild stage    PVD (peripheral vascular disease)    Right wrist pain    Hepatosplenomegaly    Type 2 diabetes mellitus with left diabetic foot ulcer    Other ascites    MDR Acinetobacter baumannii infection    Debility    Subacute osteomyelitis of left foot    Stage 3 chronic kidney disease    Atherosclerosis of left lower extremity with ulceration of midfoot    CKD stage 3 due to type 2 diabetes mellitus    Anemia due to multiple mechanisms    Persistent proteinuria    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    -Improving L  foot wound improved s/p debridement 2/13/19, multifactorial due to diabetes, PVD and venous insufficiency with improvement in granulation tissue on Abx due to multidrug resistent bacteria in the past s/p debridements and L tibial angioplasty x 2 with improvement in wound +granulation tissue s/p debridement 6/5/19 and WV placement; anticipate healing soon  -New R heel wound s/p R SFA angioplasty 10/6/20 - rec debridement in OR 10/30/20 and cont monitoring RLE PVD; if poor bleeding in OR rec tibial revascularization  -Cont ID rec Abx regimen  -Recommend continued wound care center care - seeing Dr. Davis; may benefit from hyperbaric O2 therapy  -Rec BLE Xeroform and dry kerlix wraps twice daily to shin regions with elevation of LLE above level of the heart  -Low sodium diet  -Strict glycemic control    I spent 11 minutes evaluating this patient and greater than 50% of the time was spent counseling, coordinator care and discussing the plan of care.  All questions were answered and patient stated understanding with agreement with the above treatment plan.    Mitch Chan MD [facility]  Vascular and Endovascular Surgery

## 2020-10-26 NOTE — LETTER
October 26, 2020        Rubén Davis MD  [address] [facility] [address]             [facility]  [address]  [facility] [address]  Phone: [phone]  Fax: [phone]   Patient: Marvin Ray   MR Number: [id number]   YOB: 1960   Date of Visit: 10/26/2020       Dear Dr. Davis:    Thank you for referring Marvin Ray to me for evaluation. Below are the relevant portions of my assessment and plan of care.            If you have questions, please do not hesitate to call me. I look forward to following Marvin along with you.    Sincerely,      Mitch Chan MD           CC  No Recipients

## 2020-10-26 NOTE — PATIENT INSTRUCTIONS
Wound Care  Taking proper care of your wound will help it heal. Your healthcare provider may show you how to clean and dress the wound. He or she will also explain how to tell if the wound is healing normally. If you are unsure of how to take care of the wound, be sure to clarify what dressing to use and how often you should change the bandages. Here are the basic steps.     A wound that's not healing normally may be dark in color or have white streaks.   Wash your hands  Tips for washing your hands include:  · Use liquid soap and lather for 2 minutes. Scrub between your fingers and under your nails.  · Rinse with warm water, keeping your fingers pointing down.  · Use a paper towel to dry your hands and to turn off the faucet.  Remove the used dressing  Here are suggestions for removing the dressing:  · If dressing changes cause you pain, be sure to take your pain medicine as prescribed by your healthcare provider 30 minutes before dressing changes.  · Set up your supplies.  · Put on disposable gloves if youre dressing a wound for someone else or your wound is infected.  · Loosen the tape by pulling gently toward the wound.  · Gently take off the old dressing. If the dressing is stuck to the wound, moisten it with saline (if available) or clean water.  · If you have a drain or tube in the wound, be careful not to pull on it.  · Remove the dressing 1 layer at a time and put it in a plastic bag.  · Remove your gloves.  Inspect and dress the wound  Check the wound carefully:  · Each time you change the dressing, inspect the wound carefully to be sure its healing normally by making sure your wound appears to be pink and moist, and is free of infection.    · Wash your hands again. Put on a new pair of gloves.  · Clean and dress the wound as directed by your healthcare provider or nurse. Do not put anything in the wound that is not prescribed or directed by your healthcare provider. If you have a drain or tube, be  careful not to pull on it. Make sure to secure the drain or tube as well.  · Put all supplies in a plastic bag. Seal the bag and put it in the trash.  · Be sure to wash your hands again.  Call your healthcare provider  Call your healthcare provider if you see any of the following signs of a problem:  · Bleeding that soaks the dressing  · Pink fluid weeping from the wound  · Increased drainage or drainage that is yellow, yellow-green, or foul-smelling  · Increased swelling or pain, or redness or swelling in the skin around the wound  · A change in the color of the wound, or if streaks develop in a direction away from the wound  · The area between any stitches opens up  · An increase in the size of the wound  · A fever of 100.4°F (38°C) or higher, or as directed by your healthcare provider  · Chills, increased fatigue, or a loss of appetite      Date Last Reviewed: 7/30/2015  © 3357-4214 [employer]. [address] [place] [address], [place] [address]. All rights reserved. This information is not intended as a substitute for professional medical care. Always follow your healthcare professional's instructions.          Peripheral Artery Disease (PAD)     Peripheral artery disease (PAD) occurs when the arteries that carry blood to the limbs are narrowed or blocked. This is usually due to a buildup of a fatty substance called plaque in the walls of the arteries.  PAD most often affects the arteries in the legs. When these arteries are narrowed or blocked, blood flow to the legs is reduced. This can cause leg and foot pain and other symptoms. If severe enough, reduced blood flow to the legs can lead to tissue death (gangrene) and the loss of a toe, foot, or leg. Having PAD also makes it more likely that arteries in other body areas are blocked. For instance, arteries that carry blood to the heart or brain may be affected. This raises the chances of heart attack and stroke.  Risk factors  Certain factors can make PAD  more likely. They include:  · Smoking  · Diabetes  · High blood pressure  · Unhealthy cholesterol levels  · Obesity  · Inactive lifestyle  · Older age  · Family history of PAD  Symptoms  Many people with PAD have no symptoms. If symptoms do occur, they can include:  · Pain in the muscles of the calves, thighs or hips that gets worse with activity and better with rest (intermittent claudication)  · Achy, tired, or heavy feeling in the legs  · Weakness, numbness, tingling, or loss of feeling in the legs  · Changes in skin color of the legs  · Sores on the legs and feet  · Cold leg, feet, or toes  · Pain the feet or toes even when lying down (rest pain)  Home care  PAD is a chronic (lifelong) condition. Treatment is focused on managing your condition and lowering your health risks. This may include doing the following:  · If you smoke, quit. This helps prevent further damage to your arteries and lowers your health risks. Ask your provider about medicines or products that can help you quit smoking. Also consider joining a stop-smoking program or support group.  · Be more active. This helps you lose weight and manage problems such as high blood pressure and unhealthy cholesterol levels. Start a walking program if advised to by your provider. Your provider may also help you form a safe exercise program that is right for your needs.  · Make healthy eating changes. This includes eating less fat, salt, and sugar.  · Take medicines for high blood pressure, unhealthy cholesterol levels, and diabetes as directed.  · Have your blood pressure and cholesterol levels checked as often as directed.  · If you have diabetes, try to keep your blood sugar well controlled. Test your blood sugar as directed.  · If you are overweight, talk to your provider about forming a weight-loss plan.  · Watch for cuts, scrapes, or open sores on your feet. Poor blood flow to the feet may slow healing and increase the risk of infection from these  problems.   Follow-up care  Follow up with your healthcare provider, or as advised. If imaging tests such as ultrasound were done, they will be reviewed by a doctor. You will be told the results and any new findings that may affect your care.  When to seek medical advice   Call your healthcare provider right away if any of these occur:  · Sudden severe pain in the legs or feet  · Sudden cold, pale or blue color in the legs or feet  · Weakness or numbness in the legs or feet that worsens  · Any sore or wound in the legs or feet that wont heal  · Weak pulse in your legs or feet  Know the Signs of Heart Attack and Stroke  People with PAD are at high risk for heart attack and stroke. Knowing the signs of these problems can help you protect your health and get help when you need it. Call 911 right away if you have any of the following:  Signs of a Heart Attack  · Chest discomfort, such as pain, aching, tightness, or pressure that lasts more than a few minutes, or that comes and goes  · Pain or discomfort in the arms, back, shoulders, neck, or jaw  · Shortness of breath  · Sweating (often a cold, clammy sweat)  · Nausea  · Lightheadedness  Signs of a Stroke  · Sudden numbness or weakness of the face, arms, or legs, especially on one side  · Sudden confusion or trouble speaking or understanding  · Sudden trouble seeing in one or both eyes  · Sudden trouble walking, dizziness, or loss of balance  · Sudden, severe headache with no known cause   Date Last Reviewed: 9/21/2015  © 0593-2982 [employer]. [address] [place] [address], [place] [address]. All rights reserved. This information is not intended as a substitute for professional medical care. Always follow your healthcare professional's instructions.

## 2020-10-27 ENCOUNTER — HOSPITAL ENCOUNTER (OUTPATIENT)
Dept: PREADMISSION TESTING | Facility: HOSPITAL | Age: 60
Discharge: HOME | End: 2020-10-27
Attending: SURGERY
Payer: MEDICAID

## 2020-10-27 ENCOUNTER — OFFICE VISIT (OUTPATIENT)
Dept: CARDIOLOGY | Facility: CLINIC | Age: 60
End: 2020-10-27
Payer: MEDICAID

## 2020-10-27 ENCOUNTER — HOSPITAL ENCOUNTER (OUTPATIENT)
Dept: WOUND CARE | Facility: HOSPITAL | Age: 60
Discharge: HOME | End: 2020-10-27
Attending: FAMILY MEDICINE
Payer: MEDICAID

## 2020-10-27 VITALS
BODY MASS INDEX: 27.15 KG/M2 | RESPIRATION RATE: 18 BRPM | SYSTOLIC BLOOD PRESSURE: 113 MMHG | TEMPERATURE: 97 F | HEIGHT: 70 IN | OXYGEN SATURATION: 96 % | DIASTOLIC BLOOD PRESSURE: 77 MMHG | WEIGHT: 189.63 LBS | HEART RATE: 66 BPM

## 2020-10-27 VITALS
DIASTOLIC BLOOD PRESSURE: 74 MMHG | HEART RATE: 67 BPM | SYSTOLIC BLOOD PRESSURE: 122 MMHG | RESPIRATION RATE: 15 BRPM | OXYGEN SATURATION: 96 % | HEIGHT: 70 IN | BODY MASS INDEX: 27.43 KG/M2

## 2020-10-27 VITALS — HEART RATE: 68 BPM | SYSTOLIC BLOOD PRESSURE: 121 MMHG | DIASTOLIC BLOOD PRESSURE: 60 MMHG | TEMPERATURE: 97 F

## 2020-10-27 DIAGNOSIS — E11.621 TYPE 2 DIABETES MELLITUS WITH RIGHT DIABETIC FOOT ULCER: ICD-10-CM

## 2020-10-27 DIAGNOSIS — E11.621 DIABETIC ULCER OF TOE OF RIGHT FOOT ASSOCIATED WITH TYPE 2 DIABETES MELLITUS, WITH BONE INVOLVEMENT WITHOUT EVIDENCE OF NECROSIS: ICD-10-CM

## 2020-10-27 DIAGNOSIS — E11.622 DIABETIC ULCER OF RIGHT LOWER LEG: ICD-10-CM

## 2020-10-27 DIAGNOSIS — E11.42 DM TYPE 2 WITH DIABETIC PERIPHERAL NEUROPATHY: Primary | Chronic | ICD-10-CM

## 2020-10-27 DIAGNOSIS — Z01.810 PREOP CARDIOVASCULAR EXAM: Primary | ICD-10-CM

## 2020-10-27 DIAGNOSIS — L97.529 TYPE 2 DIABETES MELLITUS WITH LEFT DIABETIC FOOT ULCER: ICD-10-CM

## 2020-10-27 DIAGNOSIS — Z95.810 AUTOMATIC IMPLANTABLE CARDIOVERTER-DEFIBRILLATOR IN SITU: ICD-10-CM

## 2020-10-27 DIAGNOSIS — I25.5 ISCHEMIC CARDIOMYOPATHY: ICD-10-CM

## 2020-10-27 DIAGNOSIS — Z01.818 PREOP TESTING: Primary | ICD-10-CM

## 2020-10-27 DIAGNOSIS — N18.31 STAGE 3A CHRONIC KIDNEY DISEASE: ICD-10-CM

## 2020-10-27 DIAGNOSIS — R94.31 ABNORMAL EKG: ICD-10-CM

## 2020-10-27 DIAGNOSIS — I73.9 PVD (PERIPHERAL VASCULAR DISEASE): Chronic | ICD-10-CM

## 2020-10-27 DIAGNOSIS — I25.10 CORONARY ARTERY DISEASE INVOLVING NATIVE CORONARY ARTERY OF NATIVE HEART WITHOUT ANGINA PECTORIS: ICD-10-CM

## 2020-10-27 DIAGNOSIS — E11.621 TYPE 2 DIABETES MELLITUS WITH LEFT DIABETIC FOOT ULCER: ICD-10-CM

## 2020-10-27 DIAGNOSIS — Z78.9 STATIN INTOLERANCE: ICD-10-CM

## 2020-10-27 DIAGNOSIS — N18.30 CKD STAGE 3 DUE TO TYPE 2 DIABETES MELLITUS: ICD-10-CM

## 2020-10-27 DIAGNOSIS — L97.519 TYPE 2 DIABETES MELLITUS WITH RIGHT DIABETIC FOOT ULCER: ICD-10-CM

## 2020-10-27 DIAGNOSIS — L97.516 DIABETIC ULCER OF TOE OF RIGHT FOOT ASSOCIATED WITH TYPE 2 DIABETES MELLITUS, WITH BONE INVOLVEMENT WITHOUT EVIDENCE OF NECROSIS: ICD-10-CM

## 2020-10-27 DIAGNOSIS — I10 ESSENTIAL HYPERTENSION: Chronic | ICD-10-CM

## 2020-10-27 DIAGNOSIS — E78.2 MIXED HYPERLIPIDEMIA: ICD-10-CM

## 2020-10-27 DIAGNOSIS — L97.919 DIABETIC ULCER OF RIGHT LOWER LEG: ICD-10-CM

## 2020-10-27 DIAGNOSIS — E11.22 CKD STAGE 3 DUE TO TYPE 2 DIABETES MELLITUS: ICD-10-CM

## 2020-10-27 LAB
ALBUMIN SERPL BCP-MCNC: 2.7 G/DL (ref 3.5–5.2)
ALP SERPL-CCNC: 149 U/L (ref 55–135)
ALT SERPL W/O P-5'-P-CCNC: 12 U/L (ref 10–44)
ANION GAP SERPL CALC-SCNC: 8 MMOL/L (ref 8–16)
AST SERPL-CCNC: 15 U/L (ref 10–40)
BASOPHILS # BLD AUTO: 0.06 K/UL (ref 0–0.2)
BASOPHILS NFR BLD: 0.9 % (ref 0–1.9)
BILIRUB SERPL-MCNC: 0.6 MG/DL (ref 0.1–1)
BUN SERPL-MCNC: 59 MG/DL (ref 8–23)
CALCIUM SERPL-MCNC: 8.9 MG/DL (ref 8.7–10.5)
CHLORIDE SERPL-SCNC: 101 MMOL/L (ref 95–110)
CO2 SERPL-SCNC: 29 MMOL/L (ref 23–29)
CREAT SERPL-MCNC: 1.5 MG/DL (ref 0.5–1.4)
DIFFERENTIAL METHOD: ABNORMAL
EOSINOPHIL # BLD AUTO: 0.4 K/UL (ref 0–0.5)
EOSINOPHIL NFR BLD: 6.2 % (ref 0–8)
ERYTHROCYTE [DISTWIDTH] IN BLOOD BY AUTOMATED COUNT: 18.3 % (ref 11.5–14.5)
EST. GFR  (AFRICAN AMERICAN): 57 ML/MIN/1.73 M^2
EST. GFR  (NON AFRICAN AMERICAN): 49 ML/MIN/1.73 M^2
GLUCOSE SERPL-MCNC: 87 MG/DL (ref 70–110)
HCT VFR BLD AUTO: 36 % (ref 40–54)
HGB BLD-MCNC: 11.5 G/DL (ref 14–18)
IMM GRANULOCYTES # BLD AUTO: 0.02 K/UL (ref 0–0.04)
IMM GRANULOCYTES NFR BLD AUTO: 0.3 % (ref 0–0.5)
LYMPHOCYTES # BLD AUTO: 0.5 K/UL (ref 1–4.8)
LYMPHOCYTES NFR BLD: 7.1 % (ref 18–48)
MCH RBC QN AUTO: 28.8 PG (ref 27–31)
MCHC RBC AUTO-ENTMCNC: 31.9 G/DL (ref 32–36)
MCV RBC AUTO: 90 FL (ref 82–98)
MONOCYTES # BLD AUTO: 0.7 K/UL (ref 0.3–1)
MONOCYTES NFR BLD: 11.1 % (ref 4–15)
NEUTROPHILS # BLD AUTO: 5 K/UL (ref 1.8–7.7)
NEUTROPHILS NFR BLD: 74.4 % (ref 38–73)
NRBC BLD-RTO: 0 /100 WBC
PLATELET # BLD AUTO: 264 K/UL (ref 150–350)
PMV BLD AUTO: 9.6 FL (ref 9.2–12.9)
POTASSIUM SERPL-SCNC: 4.6 MMOL/L (ref 3.5–5.1)
PROT SERPL-MCNC: 7.3 G/DL (ref 6–8.4)
RBC # BLD AUTO: 4 M/UL (ref 4.6–6.2)
SARS-COV-2 RDRP RESP QL NAA+PROBE: NEGATIVE
SODIUM SERPL-SCNC: 138 MMOL/L (ref 136–145)
WBC # BLD AUTO: 6.66 K/UL (ref 3.9–12.7)

## 2020-10-27 PROCEDURE — 11045 DEBRIDEMENT: ICD-10-PCS | Mod: ,,, | Performed by: FAMILY MEDICINE

## 2020-10-27 PROCEDURE — 11042 DEBRIDEMENT: ICD-10-PCS | Mod: ,,, | Performed by: FAMILY MEDICINE

## 2020-10-27 PROCEDURE — U0002 COVID-19 LAB TEST NON-CDC: HCPCS

## 2020-10-27 PROCEDURE — 99214 OFFICE O/P EST MOD 30 MIN: CPT | Mod: S$PBB,,, | Performed by: INTERNAL MEDICINE

## 2020-10-27 PROCEDURE — 11045 DBRDMT SUBQ TISS EACH ADDL: CPT | Mod: ,,, | Performed by: FAMILY MEDICINE

## 2020-10-27 PROCEDURE — 85025 COMPLETE CBC W/AUTO DIFF WBC: CPT

## 2020-10-27 PROCEDURE — 99214 PR OFFICE/OUTPT VISIT, EST, LEVL IV, 30-39 MIN: ICD-10-PCS | Mod: S$PBB,,, | Performed by: INTERNAL MEDICINE

## 2020-10-27 PROCEDURE — 99999 PR PBB SHADOW E&M-EST. PATIENT-LVL IV: ICD-10-PCS | Mod: PBBFAC,,, | Performed by: INTERNAL MEDICINE

## 2020-10-27 PROCEDURE — 11042 DBRDMT SUBQ TIS 1ST 20SQCM/<: CPT | Mod: ,,, | Performed by: FAMILY MEDICINE

## 2020-10-27 PROCEDURE — 80053 COMPREHEN METABOLIC PANEL: CPT

## 2020-10-27 PROCEDURE — 99999 PR PBB SHADOW E&M-EST. PATIENT-LVL IV: CPT | Mod: PBBFAC,,, | Performed by: INTERNAL MEDICINE

## 2020-10-27 PROCEDURE — 99214 OFFICE O/P EST MOD 30 MIN: CPT | Mod: 25,,, | Performed by: FAMILY MEDICINE

## 2020-10-27 PROCEDURE — 93005 ELECTROCARDIOGRAM TRACING: CPT | Mod: PBBFAC | Performed by: INTERNAL MEDICINE

## 2020-10-27 PROCEDURE — 36415 COLL VENOUS BLD VENIPUNCTURE: CPT

## 2020-10-27 PROCEDURE — 93010 EKG 12-LEAD: ICD-10-PCS | Mod: S$PBB,,, | Performed by: INTERNAL MEDICINE

## 2020-10-27 PROCEDURE — 99215 OFFICE O/P EST HI 40 MIN: CPT | Mod: 27,25 | Performed by: FAMILY MEDICINE

## 2020-10-27 PROCEDURE — 99214 OFFICE O/P EST MOD 30 MIN: CPT | Mod: PBBFAC,25 | Performed by: INTERNAL MEDICINE

## 2020-10-27 PROCEDURE — 93010 ELECTROCARDIOGRAM REPORT: CPT | Mod: S$PBB,,, | Performed by: INTERNAL MEDICINE

## 2020-10-27 PROCEDURE — 99214 PR OFFICE/OUTPT VISIT, EST, LEVL IV, 30-39 MIN: ICD-10-PCS | Mod: 25,,, | Performed by: FAMILY MEDICINE

## 2020-10-27 NOTE — PROGRESS NOTES
[facility]                Progress Note    Subjective:       Patient ID: Marvin Ray is a 60 y.o. male.    Chief Complaint: No chief complaint on file.    Patient transferred from wheelchair to exam chair without assistance. 5/10 pain reported to Right Foot around heel area. Patient to have heel debrided per MD Chan on Friday 10/30/20. Area appears stable at this time, but there is increased pain to periwound area near pale wound edge. Painted necrotic tissue with betadine and continued to apply santyl/hydrofera blue to yellow slough on perimeter. Right Medial Malleolus wound not measuring smaller but appears to be filling in this week - applied endoform collagen and Hydrofera Blue under foam border with instructions to leave intact this week, if possible. Right Anterior Shin Venous Ulcer is smaller with epithelialization evident throughout. Right Shin Venous Ulcer #2 went from a small intact blister last week to a large serous draining blister this week. Deflated blister, allowing skin to reconnect with wound bed and covered with stacy/Aquacel AG foam this week. Right 3rd Toe Diabetic Ulcer appears to be very slowly filling in- continuing with endoform collagen use. Right 5th toe with stable fibrin to wound that was left in place this week and protected. Left Medial Foot Diabetic Ulcer appears to be much the same size however wound bed had increased avascular tissue - cleaned up in clinic and reapplied endoform collagen and added hydrofera blue transfer to wound bed to be left for the next couple of days. Left Distal Foot Diabetic Ulcer unchanged in size but wound bed appeared more red and granular. Applied same therapy as medial foot. Ok to to continue to change at home per sister as the Left Foot has large amount of drainage evident every 2 days on bandages. Patient continuing to offload. States appetite is good and sugar levels being kept below 150's.      Observed MD Chan's previous note which suggested HBOT. Patient will require recent cardiac/lung work-up and other labwork to qualify.       Review of Systems   Constitutional: Negative.    HENT: Negative.    Respiratory: Negative.    Cardiovascular: Negative.    Gastrointestinal: Positive for abdominal distention. Negative for abdominal pain, anal bleeding and blood in stool.   Genitourinary: Negative.    Musculoskeletal: Negative.    Skin: Positive for wound.   Psychiatric/Behavioral: Negative.          Objective:        Physical Exam  Vitals signs reviewed.   Constitutional:       Appearance: Normal appearance.   HENT:      Head: Normocephalic and atraumatic.      Nose: Nose normal.      Mouth/Throat:      Mouth: Mucous membranes are moist.      Pharynx: Oropharynx is clear.   Eyes:      Extraocular Movements: Extraocular movements intact.      Pupils: Pupils are equal, round, and reactive to light.   Neck:      Musculoskeletal: Normal range of motion and neck supple.   Cardiovascular:      Rate and Rhythm: Normal rate and regular rhythm.   Pulmonary:      Effort: Pulmonary effort is normal. No respiratory distress.      Breath sounds: No wheezing.   Abdominal:      General: There is distension.      Palpations: Abdomen is soft.      Tenderness: There is no abdominal tenderness. There is no guarding.   Musculoskeletal:      Right lower leg: Edema present.      Left lower leg: Edema present.   Skin:     General: Skin is warm and dry.      Comments: +DFU to bilateral feet; left medial wound has excess slough  Left DFU of heel getting debrided by vascular this Friday   Neurological:      Mental Status: He is alert and oriented to person, place, and time.      Sensory: No sensory deficit.           Vitals:    10/27/20 1021   BP: 121/60   Pulse: 68   Temp: 97.3 °F (36.3 °C)     Debridement    Date/Time: 10/27/2020 3:43 PM  Performed by: Rubén Davis MD  Authorized by: Rubén Davis MD     Time out:  "Immediately prior to procedure a "time out" was called to verify the correct patient, procedure, equipment, support staff and site/side marked as required.    Consent Done?:  Yes (Written)  Local anesthesia used?: No      Wound Details:    Location:  Right foot    Location:  Right Midfoot    Type of Debridement:  Excisional       Length (cm):  5       Area (sq cm):  42       Width (cm):  8.4       Percent Debrided (%):  100       Depth (cm):  0.2       Total Area Debrided (sq cm):  42    Depth of debridement:  Subcutaneous tissue    Tissue debrided:  Dermis, Epidermis and Subcutaneous    Devitalized tissue debrided:  Biofilm, Fibrin and Slough    Instruments:  Curette    Bleeding:  Minimal  Hemostasis Achieved: Yes    Method Used:  Pressure  Patient tolerance:  Patient tolerated the procedure well with no immediate complications      Assessment:           ICD-10-CM ICD-9-CM   1. DM type 2 with diabetic peripheral neuropathy  E11.42 250.60     357.2   2. PVD (peripheral vascular disease)  I73.9 443.9   3. Type 2 diabetes mellitus with left diabetic foot ulcer  E11.621 250.80    L97.529 707.15   4. Diabetic ulcer of right lower leg  E11.622 250.80    L97.919 707.10   5. Diabetic ulcer of toe of right foot associated with type 2 diabetes mellitus, with bone involvement without evidence of necrosis  E11.621 250.80    L97.516 707.15   6. Type 2 diabetes mellitus with right diabetic foot ulcer  E11.621 250.80    L97.519 707.15            Wound 11/02/18 Diabetic Ulcer Left medial Foot (Active)   11/02/18     Pre-existing: Yes   Primary Wound Type: Diabetic ulc   Side: Left   Orientation: medial   Location: Foot   Wound Number (optional):    Ankle-Brachial Index:    Pulses:    Removal Indication and Assessment:    Wound Outcome:    (Retired) Wound Type:    (Retired) Wound Length (cm):    (Retired) Wound Width (cm):    (Retired) Depth (cm):    Wound Description (Comments):    Removal Indications:    Wound Image   10/27/20 " 1100   Wound WDL ex 10/27/20 1100   Dressing Appearance Moist drainage 10/27/20 1100   Drainage Amount Large 10/27/20 1100   Drainage Characteristics/Odor Yellow;Green 10/27/20 1100   Appearance Yellow;Slough;Red 10/27/20 1100   Tissue loss description Full thickness 10/27/20 1100   Red (%), Wound Tissue Color 20 % 10/27/20 1100   Yellow (%), Wound Tissue Color 80 % 10/27/20 1100   Periwound Area Scar tissue 10/27/20 1100   Wound Edges Open 10/27/20 1100   Wound Length (cm) 5 cm 10/27/20 1100   Wound Width (cm) 8.4 cm 10/27/20 1100   Wound Depth (cm) 0.2 cm 10/27/20 1100   Wound Volume (cm^3) 8.4 cm^3 10/27/20 1100   Wound Surface Area (cm^2) 42 cm^2 10/27/20 1100   Care Cleansed with:;Soap and water;Sterile normal saline 10/27/20 1100            Wound 05/08/20 1015 Diabetic Ulcer Left distal Foot (Active)   05/08/20 1015    Pre-existing: Yes   Primary Wound Type: Diabetic [facility]   Side: Left   Orientation: distal   Location: Foot   Wound Number (optional):    Ankle-Brachial Index:    Pulses:    Removal Indication and Assessment:    Wound Outcome:    (Retired) Wound Type:    (Retired) Wound Length (cm):    (Retired) Wound Width (cm):    (Retired) Depth (cm):    Wound Description (Comments):    Removal Indications:    Wound Image   10/27/20 1100   Wound WDL ex 10/27/20 1100   Dressing Appearance Moist drainage 10/27/20 1100   Drainage Amount Large 10/27/20 1100   Drainage Characteristics/Odor Yellow;Green 10/27/20 1100   Appearance Red;Yellow;Slough 10/27/20 1100   Tissue loss description Full thickness 10/27/20 1100   Red (%), Wound Tissue Color 90 % 10/27/20 1100   Yellow (%), Wound Tissue Color 10 % 10/27/20 1100   Periwound Area Scar tissue 10/27/20 1100   Wound Edges Open 10/27/20 1100   Wound Length (cm) 2.7 cm 10/27/20 1100   Wound Width (cm) 6.9 cm 10/27/20 1100   Wound Depth (cm) 0.2 cm 10/27/20 1100   Wound Volume (cm^3) 3.73 cm^3 10/27/20 1100   Wound Surface Area (cm^2) 18.63 cm^2 10/27/20 1100   Care  Cleansed with:;Soap and water;Sterile normal saline 10/27/20 1100            Wound 08/11/20 1051 Diabetic Ulcer Right medial Malleolus/Ankle (Active)   08/11/20 1051    Pre-existing: No   Primary Wound Type: Diabetic ulc   Side: Right   Orientation: medial   Location: Malleolus/Ankle   Wound Number (optional):    Ankle-Brachial Index:    Pulses:    Removal Indication and Assessment:    Wound Outcome:    (Retired) Wound Type:    (Retired) Wound Length (cm):    (Retired) Wound Width (cm):    (Retired) Depth (cm):    Wound Description (Comments):    Removal Indications:    Wound Image   10/27/20 1100   Wound WDL ex 10/27/20 1100   Dressing Appearance Dried drainage 10/27/20 1100   Drainage Amount Scant 10/27/20 1100   Drainage Characteristics/Odor Serosanguineous 10/27/20 1100   Appearance Red;Granulating 10/27/20 1100   Tissue loss description Full thickness 10/27/20 1100   Red (%), Wound Tissue Color 100 % 10/27/20 1100   Wound Edges Open 10/27/20 1100   Wound Length (cm) 1.9 cm 10/27/20 1100   Wound Width (cm) 2 cm 10/27/20 1100   Wound Depth (cm) 0.2 cm 10/27/20 1100   Wound Volume (cm^3) 0.76 cm^3 10/27/20 1100   Wound Surface Area (cm^2) 3.8 cm^2 10/27/20 1100   Care Cleansed with:;Soap and water;Sterile normal saline 10/27/20 1100            Wound 08/11/20 1052 Diabetic Ulcer Right medial Heel (Active)   08/11/20 1052    Pre-existing: No   Primary Wound Type: Diabetic ulc   Side: Right   Orientation: medial   Location: Heel   Wound Number (optional):    Ankle-Brachial Index:    Pulses:    Removal Indication and Assessment:    Wound Outcome:    (Retired) Wound Type:    (Retired) Wound Length (cm):    (Retired) Wound Width (cm):    (Retired) Depth (cm):    Wound Description (Comments):    Removal Indications:    Wound Image    10/27/20 1100   Wound WDL ex 10/27/20 1100   Dressing Appearance Moist drainage 10/27/20 1100   Drainage Amount Small 10/27/20 1100   Drainage Characteristics/Odor Serosanguineous 10/27/20  1100   Appearance Necrotic;Black;Yellow;Slough;Red;Not granulating 10/27/20 1100   Tissue loss description Full thickness 10/27/20 1100   Black (%), Wound Tissue Color 85 % 10/27/20 1100   Red (%), Wound Tissue Color 5 % 10/27/20 1100   Yellow (%), Wound Tissue Color 10 % 10/27/20 1100   Periwound Area Pale white 10/27/20 1100   Wound Edges Defined 10/27/20 1100   Wound Length (cm) 8.5 cm 10/27/20 1100   Wound Width (cm) 6.4 cm 10/27/20 1100   Wound Depth (cm) 1 cm 10/27/20 1100   Wound Volume (cm^3) 54.4 cm^3 10/27/20 1100   Wound Surface Area (cm^2) 54.4 cm^2 10/27/20 1100   Care Cleansed with:;Soap and water;Sterile normal saline 10/27/20 1100            Wound 09/29/20 1045 Other (comment) Right medial Toe, fifth (Active)   09/29/20 1045    Pre-existing:    Primary Wound Type: Other   Side: Right   Orientation: medial   Location: Toe, fifth   Wound Number (optional):    Ankle-Brachial Index:    Pulses:    Removal Indication and Assessment:    Wound Outcome:    (Retired) Wound Type:    (Retired) Wound Length (cm):    (Retired) Wound Width (cm):    (Retired) Depth (cm):    Wound Description (Comments):    Removal Indications:    Wound WDL ex 10/27/20 1100   Dressing Appearance Dry 10/27/20 1100   Drainage Amount None 10/27/20 1100   Appearance Fibrin 10/27/20 1100   Tissue loss description Partial thickness 10/27/20 1100   Periwound Area Intact 10/27/20 1100   Wound Edges Defined 10/27/20 1100   Wound Length (cm) 0.5 cm 10/27/20 1100   Wound Width (cm) 0.4 cm 10/27/20 1100   Wound Depth (cm) 0.1 cm 10/27/20 1100   Wound Volume (cm^3) 0.02 cm^3 10/27/20 1100   Wound Surface Area (cm^2) 0.2 cm^2 10/27/20 1100   Care Cleansed with:;Soap and water;Sterile normal saline 10/27/20 1100            Wound 10/13/20 1202 Venous Ulcer Right anterior Leg #1 (Active)   10/13/20 1202    Pre-existing: Yes   Primary Wound Type: Venous ulcer   Side: Right   Orientation: anterior   Location: Leg   Wound Number (optional): #1    Ankle-Brachial Index:    Pulses:    Removal Indication and Assessment:    Wound Outcome:    (Retired) Wound Type:    (Retired) Wound Length (cm):    (Retired) Wound Width (cm):    (Retired) Depth (cm):    Wound Description (Comments):    Removal Indications:    Wound Image   10/27/20 1100   Wound WDL ex 10/27/20 1100   Dressing Appearance Dried drainage 10/27/20 1100   Drainage Amount Small 10/27/20 1100   Drainage Characteristics/Odor Serosanguineous 10/27/20 1100   Appearance Red;Granulating;Epithelialization 10/27/20 1100   Tissue loss description Partial thickness 10/27/20 1100   Red (%), Wound Tissue Color 100 % 10/27/20 1100   Periwound Area Swelling 10/27/20 1100   Wound Edges Open 10/27/20 1100   Wound Length (cm) 2 cm 10/27/20 1100   Wound Width (cm) 3 cm 10/27/20 1100   Wound Depth (cm) 0.1 cm 10/27/20 1100   Wound Volume (cm^3) 0.6 cm^3 10/27/20 1100   Wound Surface Area (cm^2) 6 cm^2 10/27/20 1100   Care Cleansed with:;Soap and water;Sterile normal saline 10/27/20 1100            Wound 10/27/20 1114 Venous Ulcer Right lower Shin #2 (Active)   10/27/20 1114    Pre-existing: Yes   Primary Wound Type: Venous ulcer   Side: Right   Orientation: lower   Location: Shin   Wound Number (optional): #2   Ankle-Brachial Index:    Pulses:    Removal Indication and Assessment:    Wound Outcome:    (Retired) Wound Type:    (Retired) Wound Length (cm):    (Retired) Wound Width (cm):    (Retired) Depth (cm):    Wound Description (Comments):    Removal Indications:    Wound Image   10/27/20 1100   Wound WDL ex 10/27/20 1100   Dressing Appearance Moist drainage 10/27/20 1100   Drainage Amount Moderate 10/27/20 1100   Drainage Characteristics/Odor Serous 10/27/20 1100   Appearance Blistered;Red 10/27/20 1100   Tissue loss description Partial thickness 10/27/20 1100   Red (%), Wound Tissue Color 100 % 10/27/20 1100   Periwound Area Hemosiderin Staining;Swelling 10/27/20 1100   Wound Edges Open 10/27/20 1100   Wound Length  (cm) 8 cm 10/27/20 1100   Wound Width (cm) 6 cm 10/27/20 1100   Wound Depth (cm) 0.1 cm 10/27/20 1100   Wound Volume (cm^3) 4.8 cm^3 10/27/20 1100   Wound Surface Area (cm^2) 48 cm^2 10/27/20 1100   Care Cleansed with:;Soap and water;Sterile normal saline 10/27/20 1100            Incision/Site 06/16/20 0930 Right Toe, third anterior (Active)   06/16/20 0930   Present Prior to Hospital Arrival?: Yes   Side: Right   Location: Toe, third   Orientation: anterior   Incision Type:    Closure Method:    Additional Comments:    Removal Indication and Assessment:    Wound Outcome:    Removal Indications:    Incision WDL ex 10/27/20 1100   Dressing Appearance Dried drainage 10/27/20 1100   Drainage Amount Scant 10/27/20 1100   Drainage Characteristics/Odor Serosanguineous 10/27/20 1100   Appearance [place] 10/27/20 1100   Periwound Area Dry 10/27/20 1100   Wound Edges Open 10/27/20 1100   Wound Length (cm) 0.1 cm 10/27/20 1100   Wound Width (cm) 0.4 cm 10/27/20 1100   Wound Depth (cm) 0.2 cm 10/27/20 1100   Wound Volume (cm^3) 0.01 cm^3 10/27/20 1100   Wound Surface Area (cm^2) 0.04 cm^2 10/27/20 1100   Care Cleansed with:;Soap and water;Sterile normal saline 10/27/20 1100           Plan:                Orders Placed This Encounter   Procedures    Change dressing     Clean with saline. Apply cavilon periwound. Endoform/hydrofera blue transfer/foam border to Right medial malleolus- leave in place x 1 week if possible. Adia to Right Shin wounds with Aquacel AG foam/cast padding/stockinet. Change in 3 days per sister. Endoform to Right 3rd toe wound with hydrofera blue to 3rd toe and 5th toe prior to wrapping (will be changed when right heel dressing changed). Betadine to Right Heel with santyl/hydrofera blue to border covered with ABD - change every 2 days per sister. Secure foot with cast padding x 3/stockinet.    Change dressing     Clean with saline. Apply cavilon periwound. Endoform to wound bed. Cover with Hydrofera Blue  to wound bed/mextra/cast padding x 2/stockinet. Sister to change in 2-3 days and continue Endoform to wound bed with option of putting vashe dampened gauze over Endoform as antimicrobial as long as drainage controlled.        Follow up in about 1 week (around 11/3/2020) for wound care.     Rubén Davis MD

## 2020-10-27 NOTE — DISCHARGE INSTRUCTIONS
Your procedure  is scheduled for _Friday, 10/30/2020__.  Report to the ER the day of your surgery for a temperature check.  You will then be escorted to the Same Day Surgery unit.    Call [phone] between 2pm and 5pm on _Thursday, 10/29/2020______ to find out your arrival time for the day of surgery. Time:_________.    Important instructions:   Do not eat or drink after 12 midnight, including water.  It is okay to brush your teeth.  Do not have gum, candy or mints.     See attached medication sheet - only take a small sip of water with any medications the morning of your surgery.    DO NOT take any diabetic medication on the morning of surgery unless instructed to do so by your doctor or pre op nurse.    STOP taking Aspirin, Ibuprofen, Motrin, Mobic, Advil, Aleve, Fish oil, and Vitamin E for at least 7 days before your surgery. You may use Tylenol unless otherwise instructed by your doctor.  Follow your doctor or surgeon's instructions concerning prescribed blood thinners such as: Plavix, Eliquis, Xarelto, Coumadin, Warfarin, etc.     Prep instructions:   SHOWER        Please shower the night before and the morning of your surgery.      Use Hibiclens soap as instructed by your pre op nurse (do not use on face or genital area).   Please place clean linens on your bed the night before surgery and wear fresh clean clothing after each shower.     DO NOT shave procedural area at least 5 days before surgery due to increased risk of skin irritation and/or possible infection.     You may wear deodorant only. Do not wear powder, lotion, or cologne.     Do not wear any jewelry or have anything metal on your body (hairpins, clips, etc.).     You will be asked to remove any dentures or partials for the procedure.     Please bring any documents given to you by your doctor.     If you are going home on the same day of surgery, you must arrange for a family member or a friend to drive you home.  Public transportation is  prohibited.  You will not be able to drive home if you were given anesthesia or sedation.     Wear loose fitting clothes allowing for bandages.     Leave money and valuables home, but you may bring a cell phone.     Important: Call your surgeon if fever, chills, or illness should occur before your surgery. Call us at the Pre-op Center at [phone]  if needed.

## 2020-10-27 NOTE — PROGRESS NOTES
CARDIOVASCULAR PROGRESS NOTE    REASON FOR CONSULT:   Marvin Ray is a 60 y.o. male who presents for follow up of CAD/ICM, preop CV eval.    PCP: Susan Damonc: Dustin  Optho: [facility]  HISTORY OF PRESENT ILLNESS:   Last seen Jan 2020.    The patient is seen in the office today, accompanied by his wife.  He presents in a wheelchair.  Both of his feet are in bandages.  He apparently has progressive wounds on his right lower extremity and wound debridement is planned by Dr. Chan in the operating room.  He is here for preoperative cardiac risk stratification.  The patient reports generally stable status without angina or dyspnea.  He is not particularly active due to his foot wounds.  He denies any palpitations, lightheadedness, dizziness, syncope, or defibrillator discharges.  There has been no PND, orthopnea, or lower extremity edema.  Denies any melena, or hematuria.    I took the liberty reviewing the patient's prior angiogram noting diffuse multivessel CAD.  He was subsequently turned down for coronary bypass.  The patient has a generally stable status and I would consider the wound debridement to be a relatively low risk procedure.    CARDIOVASCULAR HISTORY:   [facility] ICD  CAD/ICM, nonoperable per [facility] note 5/2016  PAD  CVI    PAST MEDICAL HISTORY:     Past Medical History:   Diagnosis Date    Arthritis     Cellulitis     CKD (chronic kidney disease), stage III     Coronary artery disease     Diabetes mellitus     Diabetic retinopathy     Diabetic ulcer of left foot     Glaucoma     Gout     Hyperlipemia     Hypertension     ICD (implantable cardioverter-defibrillator) in place 11/02/2018    Left chest    Non-pressure chronic ulcer of other part of left foot with fat layer exposed 10/23/2018    PVD (peripheral vascular disease)     Type 2 diabetes mellitus with left diabetic foot ulcer 10/29/2018    Unsteady gait     uses a wheelchair       PAST SURGICAL HISTORY:     Past Surgical  History:   Procedure Laterality Date    AHMED GLAUCOMA IMPLANT Left 2011    DONE AT [facility]    AORTOGRAPHY WITH SERIALOGRAPHY Left 4/30/2019    Procedure: AORTOGRAM, WITH SERIALOGRAPHY, LEFT LOWER EXTREMITY, POSSIBLE INTERVENTION;  Surgeon: Mitch Chan MD;  Location: [facility] OR;  Service: Vascular;  Laterality: Left;  RN PRE OP 4-23-19.  NO H & P, No Cosent  [place]    AORTOGRAPHY WITH SERIALOGRAPHY Right 10/6/2020    Procedure: AORTOGRAM, WITH SERIALOGRAPHY, RIGHT LOWER EXTREMITY ANGIOGRAM, POSSIBLE INTERVENTION;  Surgeon: Mitch Chan MD;  Location: [facility] OR;  Service: Vascular;  Laterality: Right;  RN PREOP 10/1/2020--COVID NEGATIVE ON 10/5    BAERVELDT GLAUCOMA IMPLANT Left 2012    WITH CATARACT EXTRACTION//DONE AT [facility]    CARDIAC CATHETERIZATION Left 05/2016    CARDIAC DEFIBRILLATOR PLACEMENT Left 11/02/2018    CATARACT EXTRACTION W/  INTRAOCULAR LENS IMPLANT Left 2012    WITH BAERVEDT//DONE AT [facility]    CATARACT EXTRACTION W/  INTRAOCULAR LENS IMPLANT Right 09/26/2018    COMPLEX ()    CB DESTRUCTION WITH CYCLO G6 Left 02/15/2017        CYST REMOVAL      DEBRIDEMENT OF FOOT  12/28/2018    Procedure: DEBRIDEMENT, FOOT;  Surgeon: Mitch Chan MD;  Location: [facility] OR;  Service: Vascular;;    DEBRIDEMENT OF FOOT  6/5/2019    Procedure: DEBRIDEMENT, FOOT;  Surgeon: Mitch Chan MD;  Location: [facility] OR;  Service: Vascular;;    EXAMINATION UNDER ANESTHESIA Left 12/5/2018    Procedure: Exam under anesthesia, left foot debridement, washout and all other indicated procedures;  Surgeon: Mitch Chan MD;  Location: [facility] OR;  Service: Vascular;  Laterality: Left;  1030AM START  RN PREOP 12/3/2018-----AICD  SEEN BY DR JAMES 12/4    EXAMINATION UNDER ANESTHESIA Left 2/13/2019    Procedure: LEFT FOOT WOUND DEBRIDEMENT, WASHOUT AND ALL OTHER INDICATED PROCEDURES;  Surgeon: Mitch Chan MD;  Location: [facility] OR;  Service: Vascular;  Laterality: Left;   requesting 1st case start  THIS CASE 1ST PER DR WALL ON 2/1/19 @ 844AM -[place]  RN PREOP 2/6/2019  HAS CARDIAC CLEARANCE    EXAMINATION UNDER ANESTHESIA Left 12/28/2018    Procedure: Exam under anesthesia, left foot debridement, left foot washout, possible left second through fifth metatarsal resection;  Surgeon: Mitch Wall MD;  Location: [facility] OR;  Service: Vascular;  Laterality: Left;  1:00pm start per julissa @ 8:58am  RN  PHONE PRE OP 12-27-18--=Pt coming from [facility] [address]  NEED CONSENT    EXAMINATION UNDER ANESTHESIA Left 6/5/2019    Procedure: LEFT FOOT DEBRIDEMENT, WASHOUT AND ALL OTHER INDICATED PROCEDURES;  Surgeon: Mitch Wall MD;  Location: [facility] OR;  Service: Vascular;  Laterality: Left;  RN PRE OP 5-31-19------ICD------NEED CONSENT    INCISION AND DRAINAGE Left 11/14/2018    Procedure: Incision and Drainage, left lower extremity debridement, washout;  Surgeon: Mitch Wall MD;  Location: [facility] OR;  Service: Vascular;  Laterality: Left;    INCISION AND DRAINAGE Left 11/16/2018    Procedure: INCISION AND DRAINAGE;  Surgeon: Mitch Wall MD;  Location: [facility] OR;  Service: Vascular;  Laterality: Left;    INTRAOCULAR PROSTHESES INSERTION Right 9/26/2018    Procedure: INSERTION, IOL PROSTHESIS;  Surgeon: Perla Cortés MD;  Location: [facility] OR [address] [place] [address];  Service: Ophthalmology;  Laterality: Right;    PERITONEOCENTESIS N/A 3/1/2019    Procedure: PARACENTESIS, ABDOMINAL, INITIAL;  Surgeon: Dosc Diagnostic Provider;  Location: [facility] OR;  Service: Radiology;  Laterality: N/A;    PERITONEOCENTESIS N/A 3/25/2019    Procedure: PARACENTESIS, ABDOMINAL, INITIAL;  Surgeon: Dosc Diagnostic Provider;  Location: [facility] OR;  Service: Radiology;  Laterality: N/A;    PERITONEOCENTESIS N/A 7/22/2019    Procedure: PARACENTESIS, ABDOMINAL, INITIAL;  Surgeon: Dosc Diagnostic Provider;  Location: [facility] OR;  Service: Radiology;  Laterality: N/A;    PERITONEOCENTESIS N/A 8/16/2019     Procedure: PARACENTESIS, ABDOMINAL, INITIAL;  Surgeon: Dosc Diagnostic Provider;  Location: [facility] OR;  Service: Radiology;  Laterality: N/A;    PERITONEOCENTESIS N/A 9/26/2019    Procedure: PARACENTESIS, ABDOMINAL;  Surgeon: Dosc Diagnostic Provider;  Location: [facility] OR;  Service: Radiology;  Laterality: N/A;    PERITONEOCENTESIS N/A 10/11/2019    Procedure: PARACENTESIS, ABDOMINAL;  Surgeon: Dosc Diagnostic Provider;  Location: [facility] OR;  Service: Radiology;  Laterality: N/A;    PERITONEOCENTESIS N/A 10/31/2019    Procedure: PARACENTESIS, ABDOMINAL;  Surgeon: Dosc Diagnostic Provider;  Location: [facility] OR;  Service: Radiology;  Laterality: N/A;    PERITONEOCENTESIS N/A 11/18/2019    Procedure: PARACENTESIS, ABDOMINAL;  Surgeon: Dosc Diagnostic Provider;  Location: [facility] OR;  Service: Radiology;  Laterality: N/A;    PERITONEOCENTESIS N/A 12/16/2019    Procedure: PARACENTESIS, ABDOMINAL;  Surgeon: Dosc Diagnostic Provider;  Location: [facility] OR;  Service: Radiology;  Laterality: N/A;  2PM START    PERITONEOCENTESIS N/A 2/7/2020    Procedure: PARACENTESIS, ABDOMINAL;  Surgeon: Dosc Diagnostic Provider;  Location: [facility] OR;  Service: Radiology;  Laterality: N/A;  REQUESTED 10:30A START    PERITONEOCENTESIS N/A 2/19/2020    Procedure: PARACENTESIS, ABDOMINAL;  Surgeon: Dosc Diagnostic Provider;  Location: [facility] OR;  Service: Radiology;  Laterality: N/A;    PERITONEOCENTESIS N/A 2/28/2020    Procedure: PARACENTESIS, ABDOMINAL;  Surgeon: Dosc Diagnostic Provider;  Location: [facility] OR;  Service: Radiology;  Laterality: N/A;  REQUESTED 10AM START    PHACOEMULSIFICATION OF CATARACT Right 9/26/2018    Procedure: PHACOEMULSIFICATION, CATARACT;  Surgeon: Perla Cortés MD;  Location: [facility] OR [address] [place] [address];  Service: Ophthalmology;  Laterality: Right;    REPEAT ABDOMINAL PARACENTESIS N/A 2/11/2019    Procedure: PARACENTESIS, ABDOMINAL, REPEAT;  Surgeon: Dosc Diagnostic Provider;  Location: [facility] OR;  Service: Radiology;  Laterality:  N/A;  1PM START    REPEAT ABDOMINAL PARACENTESIS N/A 9/12/2019    Procedure: PARACENTESIS, ABDOMINAL, REPEAT;  Surgeon: Dosc Diagnostic Provider;  Location: [facility] OR;  Service: Radiology;  Laterality: N/A;  9AM START    REPEAT ABDOMINAL PARACENTESIS N/A 12/2/2019    Procedure: PARACENTESIS, ABDOMINAL, REPEAT;  Surgeon: Dosc Diagnostic Provider;  Location: [facility] OR;  Service: Radiology;  Laterality: N/A;  3PM START    REPEAT ABDOMINAL PARACENTESIS N/A 1/10/2020    Procedure: PARACENTESIS, ABDOMINAL, REPEAT;  Surgeon: Dosc Diagnostic Provider;  Location: [facility] OR;  Service: Radiology;  Laterality: N/A;  1130AM START    REPEAT ABDOMINAL PARACENTESIS N/A 1/20/2020    Procedure: PARACENTESIS, ABDOMINAL, REPEAT;  Surgeon: Dos Diagnostic Provider;  Location: [facility] OR;  Service: Radiology;  Laterality: N/A;  1PM START    REPEAT ABDOMINAL PARACENTESIS N/A 1/31/2020    Procedure: PARACENTESIS, ABDOMINAL, REPEAT;  Surgeon: Dos Diagnostic Provider;  Location: [facility] OR;  Service: Radiology;  Laterality: N/A;  10AM START    REPEAT ABDOMINAL PARACENTESIS N/A 3/16/2020    Procedure: PARACENTESIS, ABDOMINAL, REPEAT;  Surgeon: Dosc Diagnostic Provider;  Location: [facility] OR;  Service: Radiology;  Laterality: N/A;  10AM START    REPEAT ABDOMINAL PARACENTESIS N/A 3/30/2020    Procedure: PARACENTESIS, ABDOMINAL, REPEAT;  Surgeon: Dosc Diagnostic Provider;  Location: [facility] OR;  Service: Radiology;  Laterality: N/A;    REPEAT ABDOMINAL PARACENTESIS N/A 4/9/2020    Procedure: PARACENTESIS, ABDOMINAL, REPEAT;  Surgeon: Dos Diagnostic Provider;  Location: [facility] OR;  Service: Radiology;  Laterality: N/A;  1130AM START    REPEAT ABDOMINAL PARACENTESIS N/A 5/8/2020    Procedure: PARACENTESIS, ABDOMINAL, REPEAT;  Surgeon: Dosc Diagnostic Provider;  Location: [facility] OR;  Service: Radiology;  Laterality: N/A;  9AM START    REPEAT ABDOMINAL PARACENTESIS N/A 5/21/2020    Procedure: PARACENTESIS, ABDOMINAL, REPEAT;  Surgeon: Dosc Diagnostic  Provider;  Location: [facility] OR;  Service: Radiology;  Laterality: N/A;  10AM START    REPEAT ABDOMINAL PARACENTESIS N/A 6/5/2020    Procedure: PARACENTESIS, ABDOMINAL, REPEAT;  Surgeon: Dos Diagnostic Provider;  Location: [facility] OR;  Service: Radiology;  Laterality: N/A;  NOON START    REPEAT ABDOMINAL PARACENTESIS N/A 7/10/2020    Procedure: PARACENTESIS, ABDOMINAL, REPEAT;  Surgeon: Dosc Diagnostic Provider;  Location: [facility] OR;  Service: Radiology;  Laterality: N/A;  1PM START    REPEAT ABDOMINAL PARACENTESIS N/A 8/20/2020    Procedure: PARACENTESIS, ABDOMINAL, REPEAT;  Surgeon: Dosc Diagnostic Provider;  Location: [facility] OR;  Service: Radiology;  Laterality: N/A;  1PM START    REPEAT ABDOMINAL PARACENTESIS N/A 9/4/2020    Procedure: PARACENTESIS, ABDOMINAL, REPEAT;  Surgeon: Dosc Diagnostic Provider;  Location: [facility] OR;  Service: Radiology;  Laterality: N/A;  11 AM START    REPEAT ABDOMINAL PARACENTESIS N/A 9/16/2020    Procedure: PARACENTESIS, ABDOMINAL, REPEAT;  Surgeon: Dos Diagnostic Provider;  Location: [facility] OR;  Service: Radiology;  Laterality: N/A;  START 2:00 P.M.    REPEAT ABDOMINAL PARACENTESIS N/A 9/28/2020    Procedure: PARACENTESIS, ABDOMINAL, REPEAT;  Surgeon: Dos Diagnostic Provider;  Location: [facility] OR;  Service: Radiology;  Laterality: N/A;  1 P.M. START    REVISION OF PROCEDURE INVOLVING GLAUCOMA DRAINAGE DEVICE Left 10/2/2019    EXTRUDING BAERVELDT /WITH PERICARDIAL PATCH GRAFT ()    Right foot surgery  10/2014    TOE AMPUTATION Right     first and second    TOE AMPUTATION Left 11/16/2018    Procedure: AMPUTATION, TOES  2-5;  Surgeon: Mitch Chan MD;  Location: [facility] OR;  Service: Vascular;  Laterality: Left;    TOE AMPUTATION Left 12/5/2018    Procedure: AMPUTATION, TOE;  Surgeon: Mitch Chan MD;  Location: [facility] OR;  Service: Vascular;  Laterality: Left;  left great toe    TONSILLECTOMY         ALLERGIES AND MEDICATION:     Review of patient's allergies  "indicates:   Allergen Reactions    Statins-hmg-coa reductase inhibitors      Generalized Pain    Onglyza [saxagliptin]     Penicillins Rash        Medication List          Accurate as of October 27, 2020  8:45 AM. If you have any questions, ask your nurse or doctor.            CONTINUE taking these medications    allopurinoL 300 MG tablet  Commonly known as: ZYLOPRIM  Take 1 tablet (300 mg total) by mouth once daily.     aspirin 81 MG EC tablet  Commonly known as: ECOTRIN     bisacodyL 5 mg EC tablet  Commonly known as: DULCOLAX     brimonidine 0.2% 0.2 % Drop  Commonly known as: ALPHAGAN  Place 1 drop into both eyes 3 (three) times daily.     carvediloL 3.125 MG tablet  Commonly known as: COREG  TAKE 1 TABLET(3.125 MG) BY MOUTH TWICE DAILY     coenzyme Q10 100 mg capsule     dorzolamide-timolol 2-0.5% 22.3-6.8 mg/mL ophthalmic solution  Commonly known as: COSOPT  Place 1 drop into both eyes 3 (three) times daily.     ezetimibe 10 mg tablet  Commonly known as: ZETIA  TAKE 1 TABLET(10 MG) BY MOUTH EVERY DAY     fish oil-omega-3 fatty acids 300-1,000 mg capsule  Take 1 capsule by mouth 2 (two) times daily.     gabapentin 100 MG capsule  Commonly known as: NEURONTIN  TAKE 2 CAPSULES(200 MG) BY MOUTH EVERY EVENING     HYDROcodone-acetaminophen 7.5-325 mg per tablet  Commonly known as: NORCO  Take 1 tablet by mouth every 8 (eight) hours as needed for Pain.     LANTUS SOLOSTAR U-100 INSULIN glargine 100 units/mL (3mL) SubQ pen  Generic drug: insulin  Inject 35 units once daily     multivitamin Tab     pen needle, diabetic 32 gauge x 5/32" Ndle  Commonly known as: BD ULTRA-FINE AMADOR PEN NEEDLE  1 Device by Misc.(Non-Drug; Combo Route) route 2 (two) times a day.     SANTYL ointment  Generic drug: collagenase  Apply topically once daily.     torsemide 20 MG Tab  Commonly known as: DEMADEX  Take 4 tablets (80 mg total) by mouth 2 (two) times daily.     VICTOZA 3-MEMO 0.6 mg/0.1 mL (18 mg/3 mL) Pnij pen  Generic drug: " liraglutide 0.6 mg/0.1 mL (18 mg/3 mL) subq PNIJ  Inject 1.8 mg into the skin once daily.     XULTOPHY 100/3.6 100 unit-3.6 mg /mL (3 mL) Inpn  Generic drug: insulin degludec-liraglutide            SOCIAL HISTORY:     Social History     Socioeconomic History    Marital status: Single     Spouse name: Not on file    Number of children: Not on file    Years of education: 14    Highest education level: Not on file   Occupational History    Not on file   Social Needs    Financial resource strain: Not on file    Food insecurity     Worry: Not on file     Inability: Not on file    Transportation needs     Medical: Not on file     Non-medical: Not on file   Tobacco Use    Smoking status: Former Smoker     Packs/day: 1.00     Years: 3.00     Pack years: 3.00     Types: Cigarettes     Quit date: 1984     Years since quittin.3    Smokeless tobacco: Never Used   Substance and Sexual Activity    Alcohol use: Not Currently     Alcohol/week: 1.0 standard drinks     Types: 1 Cans of beer per week     Comment: rarely    Drug use: No    Sexual activity: Not Currently     Partners: Female   Lifestyle    Physical activity     Days per week: Not on file     Minutes per session: Not on file    Stress: Not on file   Relationships    Social connections     Talks on phone: Not on file     Gets together: Not on file     Attends [employer] service: Not on file     Active member of club or organization: Not on file     Attends meetings of clubs or organizations: Not on file     Relationship status: Not on file   Other Topics Concern    Not on file   Social History Narrative    Not on file       FAMILY HISTORY:     Family History   Problem Relation Age of Onset    Diabetes Mother     Heart disease Brother     No Known Problems Father     No Known Problems Sister     No Known Problems Maternal Aunt     No Known Problems Maternal Uncle     No Known Problems Paternal Aunt     No Known Problems Paternal Uncle   "   No Known Problems Maternal Grandmother     No Known Problems Maternal Grandfather     No Known Problems Paternal Grandmother     No Known Problems Paternal Grandfather     Anemia Neg Hx     Arrhythmia Neg Hx     Asthma Neg Hx     Clotting disorder Neg Hx     Fainting Neg Hx     Heart attack Neg Hx     Heart failure Neg Hx     Hyperlipidemia Neg Hx     Hypertension Neg Hx     Stroke Neg Hx     Atrial Septal Defect Neg Hx     Amblyopia Neg Hx     Blindness Neg Hx     Glaucoma Neg Hx     Macular degeneration Neg Hx     Retinal detachment Neg Hx     Strabismus Neg Hx        REVIEW OF SYSTEMS:   Review of Systems   Constitutional: Negative for chills, diaphoresis, fever and malaise/fatigue.   HENT: Negative for nosebleeds.    Eyes: Negative for blurred vision, double vision and photophobia.   Respiratory: Negative for hemoptysis, shortness of breath and wheezing.    Cardiovascular: Negative for chest pain, palpitations, orthopnea, claudication, leg swelling and PND.   Gastrointestinal: Negative for abdominal pain, blood in stool, heartburn, melena, nausea and vomiting.   Genitourinary: Negative for flank pain and hematuria.   Musculoskeletal: Negative for falls, myalgias and neck pain.   Skin: Negative for rash.   Neurological: Negative for dizziness, seizures, loss of consciousness, weakness and headaches.   Endo/Heme/Allergies: Negative for polydipsia. Does not bruise/bleed easily.   Psychiatric/Behavioral: Negative for depression and memory loss. The patient is not nervous/anxious.        PHYSICAL EXAM:     Vitals:    10/27/20 0831   BP: 122/74   Pulse: 67   Resp: 15    Body mass index is 27.43 kg/m².      Height: 5' 10" (177.8 cm)     Physical Exam  Vitals signs reviewed.   Constitutional:       General: He is not in acute distress.     Appearance: He is well-developed. He is not toxic-appearing or diaphoretic.   HENT:      Head: Normocephalic and atraumatic.   Eyes:      General: No scleral " icterus.     Conjunctiva/sclera: Conjunctivae normal.      Pupils: Pupils are equal, round, and reactive to light.   Neck:      Musculoskeletal: Normal range of motion and neck supple. No edema or neck rigidity.      Thyroid: No thyromegaly.      Vascular: No JVD.      Trachea: Trachea normal. No tracheal deviation.   Cardiovascular:      Rate and Rhythm: Normal rate and regular rhythm.      Chest Wall: PMI is not displaced.      Heart sounds: S1 normal and S2 normal. No murmur. No friction rub. No gallop.    Pulmonary:      Effort: Pulmonary effort is normal. No respiratory distress.      Breath sounds: Normal breath sounds. No stridor. No wheezing, rhonchi or rales.   Chest:      Chest wall: No tenderness.   Abdominal:      General: There is no distension.      Palpations: Abdomen is soft.   Musculoskeletal: Normal range of motion.         General: No swelling or tenderness.      Comments: bilat feet in boots/dressings   Feet:      Right foot:      Skin integrity: No ulcer.      Left foot:      Skin integrity: No ulcer.   Skin:     General: Skin is warm and dry.      Findings: No erythema or rash.   Neurological:      Mental Status: He is alert and oriented to person, place, and time.      Cranial Nerves: No cranial nerve deficit.   Psychiatric:         Speech: Speech normal.         Behavior: Behavior normal. Behavior is cooperative.         DATA:   EKG: (personally reviewed tracing(s))  10/27/20 SR 67, AWMI-age indet, similar to 9/20/19    Laboratory:  CBC:  Recent Labs   Lab 08/11/20  1136 08/25/20  1016 09/29/20  0949   WBC 20.40 H 10.60 8.20   Hemoglobin 12.2 L 11.8 L 12.3 L   Hematocrit 37.7 L 37.4 L 39.8 L   Platelets 247 323 325       CHEMISTRIES:  Recent Labs   Lab 03/12/19  0558  04/23/19  1255  05/31/19  1025  08/25/20  1016 09/29/20  0949 10/13/20  1155   Glucose 78   < > 111 H   < > 135 H   < > 260 H 129 H 152 H   Sodium 138   < > 133 L   < > 133 L   < > 136 137 142   Potassium 3.2 L   < > 3.4 L   < >  2.7 LL   < > 4.2 3.8 4.5   BUN, Bld 71 H   < > 112 H   < > 64 H   < > 44 H 69 H 46 H   Creatinine 1.2   < > 1.9 H   < > 1.3   < > 1.3 1.5 H 1.3   eGFR if African American >60.0   < > 43 A   < > >60   < > >60 57 A >60   eGFR if non  >60.0   < > 37 A   < > 59 A   < > 58 A 49 A 58 A   Calcium 8.9   < > 9.3   < > 9.3   < > 8.3 L 8.3 L 8.1 L   Magnesium 1.8  --  1.7  --  2.1  --   --   --   --     < > = values in this interval not displayed.       CARDIAC BIOMARKERS:  Recent Labs   Lab 12/11/18 2024 12/12/18  0226  01/30/19  1630 02/01/19  1100 02/05/19  1530 03/11/19  0335   CPK  --   --    < > 598 H 384 H 198  --    Troponin I 0.449 H 0.338 H  --   --   --   --  0.013    < > = values in this interval not displayed.       COAGS:  Recent Labs   Lab 03/10/19  2349 04/23/19  1255 05/31/19  1025   INR 1.0 1.0 1.1       LIPIDS/LFTS:  Recent Labs   Lab 07/27/18  0820  10/30/18  0955  08/11/20  1136 08/25/20  1016 09/04/20  0814 09/29/20  0949   Cholesterol 151  --  107 L  --   --   --  125  --    Triglycerides 83  --  44  --   --   --  54  --    HDL 29 L  --  27 L  --   --   --  30 L  --    LDL Cholesterol 105.4  --  71.2  --   --   --  84.2  --    Non-HDL Cholesterol 122  --  80  --   --   --  95  --    AST 27   < >  --    < > 21 21  --  19   ALT 28   < >  --    < > 11 30  --  13    < > = values in this interval not displayed.       Cardiovascular Testing:  LE art US/SIDRA 11/4/19  1. Right lower extremity arterial ultrasound shows no hemodynamically significant stenosis.  Pressures indicate moderate arterial occlusive disease.  SIDRA 1.85.  2. Left lower extremity arterial ultrasound shows no hemodynamically significant stenosis.  Pressures indicate moderate to severe arterial occlusive disease. SIDRA 1.85.    LE venous US/reflux 8/9/19  No evidence of lower extremity DVT bilaterally.  R GSV reflux (below knee).   vein noted below knee.  L GSV reflux (below knee).   vein noted below knee.  L  Giacomini vein reflux.    Echo 12/11/18  · Severely decreased left ventricular systolic function. The estimated ejection fraction is 20%  · Severe global hypokinetic wall motion. Cannot exclude RWMA.  · Septal wall has abnormal motion. Systolic and diastolic flattening of the interventricular septum consistent with right ventricle pressure and volume overload.  · Left ventricular diastolic dysfunction.  · Mild right ventricular enlargement.  · Mildly to moderately reduced right ventricular systolic function.  · Moderate tricuspid regurgitation.  · There is a large left pleural effusion.    L MPI 10/17/19  · Abnormal myocardial perfusion study  · There is a moderate amount of infarct in the inferior wall(s).In the typical distribution of the RCA territory.  · Post Stress Ejection Fraction is 17 %    Cath 5/6/16 (images personally reviewed and interpreted) (subsequently seen by Dr. Mathews 5/12/16 and deemed to not be a surgical candidate)  B. Summary/Post-Operative Diagnosis    Three vessel coronary artery disease.    LV systolic and diastolic dysfunction.  D. Hemodynamic Results  LVEDP (Pre): 25 mmHg  LVEDP (Post): 30 mmHg  Ejection Fraction: 35%  E. Angiographic Results       Patient has a right dominant coronary artery.      - Left Main Coronary Artery:             The LM has luminal irregularities. There is BAKARI 3 flow.     - Left Anterior Descending Artery:             The proximal LAD has a 80% stenosis. There is BAKARI 3 flow.             The mid LAD has a 90% stenosis. There is BAKARI 3 flow. The remaining portion of the vessel is diffusely diseased.     - Left Circumflex Artery:             The mid LCX has a 60% stenosis. There is BAKARI 3 flow. The remaining portion of the vessel has luminal irregularities.     - Right Coronary Artery:             The proximal RCA has a 60% stenosis. There is BAKARI 3 flow.             The mid RCA has a 80% stenosis. There is BAKARI 3 flow. The remaining portion of the vessel has  luminal irregularities.     - Posterior Descending Artery:             The proximal PDA has a 60% stenosis. There is BAKARI 3 flow. The remaining portion of the vessel has luminal irregularities.     - Radial:             The radial.  G. Recommendations  1. Routine post-cath care.  2. Risk factor reductions.  3. ASA 81mg.  4. Statin therapy.  5. Consult CV Surgery.      ASSESSMENT:   # CAD/ICM, clinically stable/euvolemic  # [employer] ICD, functioning normally  # Statin allergy on zetia  # PAD followed by Dr. Chan  # preop CV eval prior to RLE wound debridement    PLAN:   Continue medical therapy.  The patient is at moderate cardiac risk for the planned surgery and general anesthesia as deemed necessary.  No further preoperative cardiac testing is required at this juncture.  Cont aspirin 81 mg daily.  OK to hold periop if absolutely necessary.  If sxs develop in future, consider Cardiac PET Viab study.  RTC 3 months with [employer] ICD check (Jan 2021), [email or web address] in interim.      Ismael Romano MD, FACC

## 2020-10-27 NOTE — ANESTHESIA PREPROCEDURE EVALUATION
10/27/2020  Marvin Ray is a 60 y.o., male scheduled for RIGHT FOOT DEBRIDEMENT, WASHOUT AND ALL OTHER INDICATED PROCEDURES (Right ) on 10/30/2020.    [employer] ICD    Model # [id number]  Serial # [id number]  Item # [id number]        Past Medical History:   Diagnosis Date    Arthritis     Cellulitis     CKD (chronic kidney disease), stage III     Coronary artery disease     Diabetes mellitus     Diabetic retinopathy     Diabetic ulcer of left foot     Glaucoma     Gout     Hyperlipemia     Hypertension     ICD (implantable cardioverter-defibrillator) in place 11/02/2018    Left chest    Non-pressure chronic ulcer of other part of left foot with fat layer exposed 10/23/2018    PVD (peripheral vascular disease)     Type 2 diabetes mellitus with left diabetic foot ulcer 10/29/2018    Unsteady gait     uses a wheelchair       Past Surgical History:   Procedure Laterality Date    AHMED GLAUCOMA IMPLANT Left 2011    DONE AT [facility]    AORTOGRAPHY WITH SERIALOGRAPHY Left 4/30/2019    Procedure: AORTOGRAM, WITH SERIALOGRAPHY, LEFT LOWER EXTREMITY, POSSIBLE INTERVENTION;  Surgeon: Mitch Chan MD;  Location: [facility] OR;  Service: Vascular;  Laterality: Left;  RN PRE OP 4-23-19.  NO H & P, No Cosent  [place]    AORTOGRAPHY WITH SERIALOGRAPHY Right 10/6/2020    Procedure: AORTOGRAM, WITH SERIALOGRAPHY, RIGHT LOWER EXTREMITY ANGIOGRAM, POSSIBLE INTERVENTION;  Surgeon: Mitch Chan MD;  Location: [facility] OR;  Service: Vascular;  Laterality: Right;  RN PREOP 10/1/2020--COVID NEGATIVE ON 10/5    BAERVELDT GLAUCOMA IMPLANT Left 2012    WITH CATARACT EXTRACTION//DONE AT [facility]    CARDIAC CATHETERIZATION Left 05/2016    CARDIAC DEFIBRILLATOR PLACEMENT Left 11/02/2018    CATARACT EXTRACTION W/  INTRAOCULAR LENS IMPLANT Left 2012    WITH BAERVEDT//DONE AT [facility]    CATARACT  EXTRACTION W/  INTRAOCULAR LENS IMPLANT Right 09/26/2018    COMPLEX ()    CB DESTRUCTION WITH CYCLO G6 Left 02/15/2017        CYST REMOVAL      DEBRIDEMENT OF FOOT  12/28/2018    Procedure: DEBRIDEMENT, FOOT;  Surgeon: Mitch Wall MD;  Location: [facility] OR;  Service: Vascular;;    DEBRIDEMENT OF FOOT  6/5/2019    Procedure: DEBRIDEMENT, FOOT;  Surgeon: Mitch Wall MD;  Location: [facility] OR;  Service: Vascular;;    EXAMINATION UNDER ANESTHESIA Left 12/5/2018    Procedure: Exam under anesthesia, left foot debridement, washout and all other indicated procedures;  Surgeon: Mitch Wall MD;  Location: [facility] OR;  Service: Vascular;  Laterality: Left;  1030AM START  RN PREOP 12/3/2018-----AICD  SEEN BY DR JAMES 12/4    EXAMINATION UNDER ANESTHESIA Left 2/13/2019    Procedure: LEFT FOOT WOUND DEBRIDEMENT, WASHOUT AND ALL OTHER INDICATED PROCEDURES;  Surgeon: Mitch Wall MD;  Location: [facility] OR;  Service: Vascular;  Laterality: Left;  requesting 1st case start  THIS CASE 1ST PER DR WALL ON 2/1/19 @ 844AM -  RN PREOP 2/6/2019  HAS CARDIAC CLEARANCE    EXAMINATION UNDER ANESTHESIA Left 12/28/2018    Procedure: Exam under anesthesia, left foot debridement, left foot washout, possible left second through fifth metatarsal resection;  Surgeon: Mitch Wall MD;  Location: [facility] OR;  Service: Vascular;  Laterality: Left;  1:00pm start per julissa @ 8:58am  RN  PHONE PRE OP 12-27-18--=Pt coming [facility]  NEED CONSENT    EXAMINATION UNDER ANESTHESIA Left 6/5/2019    Procedure: LEFT FOOT DEBRIDEMENT, WASHOUT AND ALL OTHER INDICATED PROCEDURES;  Surgeon: Mitch Wall MD;  Location: [facility] OR;  Service: Vascular;  Laterality: Left;  RN PRE OP 5-31-19------ICD------NEED CONSENT    INCISION AND DRAINAGE Left 11/14/2018    Procedure: Incision and Drainage, left lower extremity debridement, washout;  Surgeon: Mitch Wall MD;  Location: [facility]  OR;  Service: Vascular;  Laterality: Left;    INCISION AND DRAINAGE Left 11/16/2018    Procedure: INCISION AND DRAINAGE;  Surgeon: Mitch Chan MD;  Location: [facility] OR;  Service: Vascular;  Laterality: Left;    INTRAOCULAR PROSTHESES INSERTION Right 9/26/2018    Procedure: INSERTION, IOL PROSTHESIS;  Surgeon: Perla Cortés MD;  [facility];  Service: Ophthalmology;  Laterality: Right;    PERITONEOCENTESIS N/A 3/1/2019    Procedure: PARACENTESIS, ABDOMINAL, INITIAL;  Surgeon: Dosc Diagnostic Provider;  Location: [facility] OR;  Service: Radiology;  Laterality: N/A;    PERITONEOCENTESIS N/A 3/25/2019    Procedure: PARACENTESIS, ABDOMINAL, INITIAL;  Surgeon: Dosc Diagnostic Provider;  Location: [facility] OR;  Service: Radiology;  Laterality: N/A;    PERITONEOCENTESIS N/A 7/22/2019    Procedure: PARACENTESIS, ABDOMINAL, INITIAL;  Surgeon: Dosc Diagnostic Provider;  Location: [facility] OR;  Service: Radiology;  Laterality: N/A;    PERITONEOCENTESIS N/A 8/16/2019    Procedure: PARACENTESIS, ABDOMINAL, INITIAL;  Surgeon: Dosc Diagnostic Provider;  Location: [facility] OR;  Service: Radiology;  Laterality: N/A;    PERITONEOCENTESIS N/A 9/26/2019    Procedure: PARACENTESIS, ABDOMINAL;  Surgeon: Dosc Diagnostic Provider;  Location: [facility] OR;  Service: Radiology;  Laterality: N/A;    PERITONEOCENTESIS N/A 10/11/2019    Procedure: PARACENTESIS, ABDOMINAL;  Surgeon: Dosc Diagnostic Provider;  Location: [facility] OR;  Service: Radiology;  Laterality: N/A;    PERITONEOCENTESIS N/A 10/31/2019    Procedure: PARACENTESIS, ABDOMINAL;  Surgeon: Dosc Diagnostic Provider;  Location: [facility] OR;  Service: Radiology;  Laterality: N/A;    PERITONEOCENTESIS N/A 11/18/2019    Procedure: PARACENTESIS, ABDOMINAL;  Surgeon: Dosc Diagnostic Provider;  Location: [facility] OR;  Service: Radiology;  Laterality: N/A;    PERITONEOCENTESIS N/A 12/16/2019    Procedure: PARACENTESIS, ABDOMINAL;  Surgeon: Dosc Diagnostic Provider;  Location: [facility] OR;   Service: Radiology;  Laterality: N/A;  2PM START    PERITONEOCENTESIS N/A 2/7/2020    Procedure: PARACENTESIS, ABDOMINAL;  Surgeon: Dosc Diagnostic Provider;  Location: [facility] OR;  Service: Radiology;  Laterality: N/A;  REQUESTED 10:30A START    PERITONEOCENTESIS N/A 2/19/2020    Procedure: PARACENTESIS, ABDOMINAL;  Surgeon: Dosc Diagnostic Provider;  Location: [facility] OR;  Service: Radiology;  Laterality: N/A;    PERITONEOCENTESIS N/A 2/28/2020    Procedure: PARACENTESIS, ABDOMINAL;  Surgeon: Dosc Diagnostic Provider;  Location: [facility] OR;  Service: Radiology;  Laterality: N/A;  REQUESTED 10AM START    PHACOEMULSIFICATION OF CATARACT Right 9/26/2018    Procedure: PHACOEMULSIFICATION, CATARACT;  Surgeon: Perla Cortés MD;  Location: [facility] OR [address] [place] [address];  Service: Ophthalmology;  Laterality: Right;    REPEAT ABDOMINAL PARACENTESIS N/A 2/11/2019    Procedure: PARACENTESIS, ABDOMINAL, REPEAT;  Surgeon: Dosc Diagnostic Provider;  Location: [facility] OR;  Service: Radiology;  Laterality: N/A;  1PM START    REPEAT ABDOMINAL PARACENTESIS N/A 9/12/2019    Procedure: PARACENTESIS, ABDOMINAL, REPEAT;  Surgeon: Dosc Diagnostic Provider;  Location: [facility] OR;  Service: Radiology;  Laterality: N/A;  9AM START    REPEAT ABDOMINAL PARACENTESIS N/A 12/2/2019    Procedure: PARACENTESIS, ABDOMINAL, REPEAT;  Surgeon: Dosc Diagnostic Provider;  Location: [facility] OR;  Service: Radiology;  Laterality: N/A;  3PM START    REPEAT ABDOMINAL PARACENTESIS N/A 1/10/2020    Procedure: PARACENTESIS, ABDOMINAL, REPEAT;  Surgeon: Dosc Diagnostic Provider;  Location: [facility] OR;  Service: Radiology;  Laterality: N/A;  1130AM START    REPEAT ABDOMINAL PARACENTESIS N/A 1/20/2020    Procedure: PARACENTESIS, ABDOMINAL, REPEAT;  Surgeon: Dosc Diagnostic Provider;  Location: [facility] OR;  Service: Radiology;  Laterality: N/A;  1PM START    REPEAT ABDOMINAL PARACENTESIS N/A 1/31/2020    Procedure: PARACENTESIS, ABDOMINAL, REPEAT;  Surgeon: Dosc Diagnostic Provider;   Location: [facility] OR;  Service: Radiology;  Laterality: N/A;  10AM START    REPEAT ABDOMINAL PARACENTESIS N/A 3/16/2020    Procedure: PARACENTESIS, ABDOMINAL, REPEAT;  Surgeon: Dosc Diagnostic Provider;  Location: [facility] OR;  Service: Radiology;  Laterality: N/A;  10AM START    REPEAT ABDOMINAL PARACENTESIS N/A 3/30/2020    Procedure: PARACENTESIS, ABDOMINAL, REPEAT;  Surgeon: Dosc Diagnostic Provider;  Location: [facility] OR;  Service: Radiology;  Laterality: N/A;    REPEAT ABDOMINAL PARACENTESIS N/A 4/9/2020    Procedure: PARACENTESIS, ABDOMINAL, REPEAT;  Surgeon: Dos Diagnostic Provider;  Location: [facility] OR;  Service: Radiology;  Laterality: N/A;  1130AM START    REPEAT ABDOMINAL PARACENTESIS N/A 5/8/2020    Procedure: PARACENTESIS, ABDOMINAL, REPEAT;  Surgeon: Dos Diagnostic Provider;  Location: [facility] OR;  Service: Radiology;  Laterality: N/A;  9AM START    REPEAT ABDOMINAL PARACENTESIS N/A 5/21/2020    Procedure: PARACENTESIS, ABDOMINAL, REPEAT;  Surgeon: Dos Diagnostic Provider;  Location: [facility] OR;  Service: Radiology;  Laterality: N/A;  10AM START    REPEAT ABDOMINAL PARACENTESIS N/A 6/5/2020    Procedure: PARACENTESIS, ABDOMINAL, REPEAT;  Surgeon: Dos Diagnostic Provider;  Location: [facility] OR;  Service: Radiology;  Laterality: N/A;  NOON START    REPEAT ABDOMINAL PARACENTESIS N/A 7/10/2020    Procedure: PARACENTESIS, ABDOMINAL, REPEAT;  Surgeon: Dosc Diagnostic Provider;  Location: [facility] OR;  Service: Radiology;  Laterality: N/A;  1PM START    REPEAT ABDOMINAL PARACENTESIS N/A 8/20/2020    Procedure: PARACENTESIS, ABDOMINAL, REPEAT;  Surgeon: Dosc Diagnostic Provider;  Location: [facility] OR;  Service: Radiology;  Laterality: N/A;  1PM START    REPEAT ABDOMINAL PARACENTESIS N/A 9/4/2020    Procedure: PARACENTESIS, ABDOMINAL, REPEAT;  Surgeon: Dosc Diagnostic Provider;  Location: [facility] OR;  Service: Radiology;  Laterality: N/A;  11 AM START    REPEAT ABDOMINAL PARACENTESIS N/A 9/16/2020    Procedure: PARACENTESIS,  ABDOMINAL, REPEAT;  Surgeon: [facility] Diagnostic Provider;  Location: [facility] OR;  Service: Radiology;  Laterality: N/A;  START 2:00 P.M.    REPEAT ABDOMINAL PARACENTESIS N/A 9/28/2020    Procedure: PARACENTESIS, ABDOMINAL, REPEAT;  Surgeon: Jovani Diagnostic Provider;  Location: [facility] OR;  Service: Radiology;  Laterality: N/A;  1 P.M. START    REVISION OF PROCEDURE INVOLVING GLAUCOMA DRAINAGE DEVICE Left 10/2/2019    EXTRUDING BAERVELDT /WITH PERICARDIAL PATCH GRAFT ()    Right foot surgery  10/2014    TOE AMPUTATION Right     first and second    TOE AMPUTATION Left 11/16/2018    Procedure: AMPUTATION, TOES  2-5;  Surgeon: Mitch Chan MD;  Location: [facility] OR;  Service: Vascular;  Laterality: Left;    TOE AMPUTATION Left 12/5/2018    Procedure: AMPUTATION, TOE;  Surgeon: Mitch Chan MD;  Location: [facility] OR;  Service: Vascular;  Laterality: Left;  left great toe    TONSILLECTOMY           Anesthesia Evaluation    I have reviewed the Patient Summary Reports.    I have reviewed the Nursing Notes. I have reviewed the NPO Status.   I have reviewed the Medications.     Review of Systems  Anesthesia Hx:  No problems with previous Anesthesia  Denies Family Hx of Anesthesia complications.   Denies Personal Hx of Anesthesia complications.   Social:  Former Smoker    Hematology/Oncology:  Hematology Normal   Oncology Normal     EENT/Dental:EENT/Dental Normal   Cardiovascular:   Pacemaker Hypertension CAD  CABG/stent  PVD hyperlipidemia ECG has been reviewed. EF 17-20% per 2018 echo and 2019 myocardial perfusion study Functional Capacity unable to determine, Limited due to feet wounds    Pulmonary:  Pulmonary Normal    Renal/:   Chronic Renal Disease, CRI    Hepatic/GI:  Hepatic/GI Normal    Musculoskeletal:   Arthritis     Neurological:  Neurology Normal    Endocrine:   Diabetes, type 2 Checks sugars daily, ranges    Dermatological:  Skin Normal        Physical Exam  General:  Well nourished   "  Airway/Jaw/Neck:  AIRWAY FINDINGS: Normal      Chest/Lungs:  Chest/Lungs Clear    Heart/Vascular:  Heart Findings: Normal    Abdomen:  Distended abdomen. Pt had large volume paracentesis about 1 week ago     Mental Status:  Mental Status Findings: Normal        Anesthesia Plan  Type of Anesthesia, risks & benefits discussed:  Anesthesia Type:  regional  Patient's Preference:   Intra-op Monitoring Plan: standard ASA monitors  Intra-op Monitoring Plan Comments:   Post Op Pain Control Plan:   Post Op Pain Control Plan Comments:   Induction:   IV  Beta Blocker:  Patient is on a Beta-Blocker and has received one dose within the past 24 hours (No further documentation required).       Informed Consent: Patient understands risks and agrees with Anesthesia plan.  Questions answered. Anesthesia consent signed with patient.  ASA Score: 3     Day of Surgery Review of History & Physical:  There are no significant changes.  H&P update referred to the provider.     Anesthesia Plan Notes: Anesthesia consent to be signed morning of procedure on 10/30/2020.  Hx of [employer] ICD, CAD/ICM, nonoperable per [facility] note 5/2016, PAD. Patient reports his ICD was interrogated Feb 2020, due again Jan 2021. The patient was seen by Dr. Romano on 10/27/2020 he states, " The patient is at moderate cardiac risk for the planned surgery and general anesthesia as deemed necessary.  No further preoperative cardiac testing is required at this juncture.Cont aspirin 81 mg daily.  OK to hold periop if absolutely necessary."  GIVEN EF OF 17-20% WE WILL BE EXTREMELY JUDICIOUS ABOUT ANESTHETIC        Ready For Surgery From Anesthesia Perspective.       "

## 2020-10-29 LAB
LEFT ABI: 1.89
LEFT ANT TIBIAL SYS PSV: 102 CM/S
LEFT ARM BP: 135 MMHG
LEFT CFA PSV: 107 CM/S
LEFT DORSALIS PEDIS: 255 MMHG
LEFT EXTERNAL ILIAC PSV: 82 CM/S
LEFT PERONEAL SYS PSV: 65 CM/S
LEFT POPLITEAL PSV: 106 CM/S
LEFT POST TIBIAL SYS PSV: 28 CM/S
LEFT POSTERIOR TIBIAL: 121 MMHG
LEFT PROFUNDA SYS PSV: 99 CM/S
LEFT SUPER FEMORAL DIST SYS PSV: 85 CM/S
LEFT SUPER FEMORAL MID SYS PSV: 109 CM/S
LEFT SUPER FEMORAL OSTIAL SYS PSV: 126 CM/S
LEFT SUPER FEMORAL PROX SYS PSV: 84 CM/S
LEFT TIB/PER TRUNK SYS PSV: 113 CM/S
OHS CV LEFT LOWER EXTREMITY ABI (NO CALC): 0.9
OHS CV RIGHT ABI LOWER EXTREMITY (NO CALC): 1.14
RIGHT ABI: 1.89
RIGHT ANT TIBIAL SYS PSV: 64 CM/S
RIGHT ARM BP: 134 MMHG
RIGHT CFA PSV: 84 CM/S
RIGHT DORSALIS PEDIS: 255 MMHG
RIGHT EXTERNAL ILLIAC PSV: 97 CM/S
RIGHT PERONEAL SYS PSV: 123 CM/S
RIGHT POPLITEAL PSV: 113 CM/S
RIGHT POST TIBIAL SYS PSV: 35 CM/S
RIGHT POSTERIOR TIBIAL: 154 MMHG
RIGHT PROFUNDA SYS PSV: 80 CM/S
RIGHT SUPER FEMORAL DIST SYS PSV: 136 CM/S
RIGHT SUPER FEMORAL MID SYS PSV: 146 CM/S
RIGHT SUPER FEMORAL OSTIAL SYS PSV: 83 CM/S
RIGHT SUPER FEMORAL PROX SYS PSV: 103 CM/S
RIGHT TIB/PER TRUNK SYS PSV: 248 CM/S

## 2020-10-29 NOTE — PRE-PROCEDURE INSTRUCTIONS
Spoke with Ismael (rep from . [facility]) Anesthesia recommendation, he states bipolar cautery is best with an ICD  He is going to look further into his chart and email me.

## 2020-10-30 ENCOUNTER — ANESTHESIA (OUTPATIENT)
Dept: SURGERY | Facility: HOSPITAL | Age: 60
End: 2020-10-30
Payer: MEDICAID

## 2020-11-03 ENCOUNTER — HOSPITAL ENCOUNTER (OUTPATIENT)
Facility: HOSPITAL | Age: 60
Discharge: HOME | End: 2020-11-03
Attending: RADIOLOGY | Admitting: RADIOLOGY
Payer: MEDICAID

## 2020-11-03 ENCOUNTER — HOSPITAL ENCOUNTER (OUTPATIENT)
Dept: WOUND CARE | Facility: HOSPITAL | Age: 60
Discharge: HOME | End: 2020-11-03
Attending: FAMILY MEDICINE
Payer: MEDICAID

## 2020-11-03 VITALS — DIASTOLIC BLOOD PRESSURE: 59 MMHG | TEMPERATURE: 98 F | SYSTOLIC BLOOD PRESSURE: 113 MMHG | HEART RATE: 76 BPM

## 2020-11-03 VITALS — SYSTOLIC BLOOD PRESSURE: 110 MMHG | DIASTOLIC BLOOD PRESSURE: 64 MMHG

## 2020-11-03 DIAGNOSIS — L97.919 DIABETIC ULCER OF RIGHT LOWER LEG: ICD-10-CM

## 2020-11-03 DIAGNOSIS — L97.516 DIABETIC ULCER OF TOE OF RIGHT FOOT ASSOCIATED WITH TYPE 2 DIABETES MELLITUS, WITH BONE INVOLVEMENT WITHOUT EVIDENCE OF NECROSIS: Primary | ICD-10-CM

## 2020-11-03 DIAGNOSIS — L97.519 TYPE 2 DIABETES MELLITUS WITH RIGHT DIABETIC FOOT ULCER: ICD-10-CM

## 2020-11-03 DIAGNOSIS — I70.244: ICD-10-CM

## 2020-11-03 DIAGNOSIS — I73.9 PVD (PERIPHERAL VASCULAR DISEASE): ICD-10-CM

## 2020-11-03 DIAGNOSIS — E11.621 TYPE 2 DIABETES MELLITUS WITH LEFT DIABETIC FOOT ULCER: ICD-10-CM

## 2020-11-03 DIAGNOSIS — E11.621 TYPE 2 DIABETES MELLITUS WITH RIGHT DIABETIC FOOT ULCER: ICD-10-CM

## 2020-11-03 DIAGNOSIS — E11.621 DIABETIC ULCER OF TOE OF RIGHT FOOT ASSOCIATED WITH TYPE 2 DIABETES MELLITUS, WITH BONE INVOLVEMENT WITHOUT EVIDENCE OF NECROSIS: Primary | ICD-10-CM

## 2020-11-03 DIAGNOSIS — L97.529 TYPE 2 DIABETES MELLITUS WITH LEFT DIABETIC FOOT ULCER: ICD-10-CM

## 2020-11-03 DIAGNOSIS — R18.8 OTHER ASCITES: ICD-10-CM

## 2020-11-03 DIAGNOSIS — E11.622 DIABETIC ULCER OF RIGHT LOWER LEG: ICD-10-CM

## 2020-11-03 PROCEDURE — 99215 OFFICE O/P EST HI 40 MIN: CPT

## 2020-11-03 PROCEDURE — 99215 OFFICE O/P EST HI 40 MIN: CPT | Performed by: FAMILY MEDICINE

## 2020-11-03 PROCEDURE — 99214 OFFICE O/P EST MOD 30 MIN: CPT | Mod: ,,, | Performed by: FAMILY MEDICINE

## 2020-11-03 PROCEDURE — 99214 PR OFFICE/OUTPT VISIT, EST, LEVL IV, 30-39 MIN: ICD-10-PCS | Mod: ,,, | Performed by: FAMILY MEDICINE

## 2020-11-03 NOTE — H&P
Radiology History & Physical      SUBJECTIVE:     Chief Complaint: Recurrent painful, tense ascites     History of Present Illness:  Marvin Ray is a 60 y.o. male with PMHx of CKD III and combined systolic/diastolic CHF complicated by recurrent abdominal distension and pain 2/2 recurrent painful, tense ascites without TTP on PE to suggest SBP requiring frequent  therapeutic LVP.      A new outpatient IR consult placed for US-guided therapeutic large-volume paracentesis.    Past Medical History:   Diagnosis Date    Arthritis     Cellulitis     CKD (chronic kidney disease), stage III     Coronary artery disease     Diabetes mellitus     Diabetic retinopathy     Diabetic ulcer of left foot     Glaucoma     Gout     Hyperlipemia     Hypertension     ICD (implantable cardioverter-defibrillator) in place 11/02/2018    Left chest    Non-pressure chronic ulcer of other part of left foot with fat layer exposed 10/23/2018    PVD (peripheral vascular disease)     Type 2 diabetes mellitus with left diabetic foot ulcer 10/29/2018    Unsteady gait     uses a wheelchair     Past Surgical History:   Procedure Laterality Date    AHMED GLAUCOMA IMPLANT Left 2011    DONE AT [facility]    AORTOGRAPHY WITH SERIALOGRAPHY Left 4/30/2019    Procedure: AORTOGRAM, WITH SERIALOGRAPHY, LEFT LOWER EXTREMITY, POSSIBLE INTERVENTION;  Surgeon: Mitch Chan MD;  Location: [facility] OR;  Service: Vascular;  Laterality: Left;  RN PRE OP 4-23-19.  NO H & P, No Cosent  [place]    AORTOGRAPHY WITH SERIALOGRAPHY Right 10/6/2020    Procedure: AORTOGRAM, WITH SERIALOGRAPHY, RIGHT LOWER EXTREMITY ANGIOGRAM, POSSIBLE INTERVENTION;  Surgeon: Mitch hCan MD;  Location: [facility] OR;  Service: Vascular;  Laterality: Right;  RN PREOP 10/1/2020--COVID NEGATIVE ON 10/5    BAERVELDT GLAUCOMA IMPLANT Left 2012    WITH CATARACT EXTRACTION//DONE AT [facility]    CARDIAC CATHETERIZATION Left 05/2016    CARDIAC DEFIBRILLATOR PLACEMENT Left  11/02/2018    CATARACT EXTRACTION W/  INTRAOCULAR LENS IMPLANT Left 2012    WITH BAERVEDT//DONE AT [facility]    CATARACT EXTRACTION W/  INTRAOCULAR LENS IMPLANT Right 09/26/2018    COMPLEX ()    CB DESTRUCTION WITH CYCLO G6 Left 02/15/2017        CYST REMOVAL      DEBRIDEMENT OF FOOT  12/28/2018    Procedure: DEBRIDEMENT, FOOT;  Surgeon: Mitch Wall MD;  Location: [facility] OR;  Service: Vascular;;    DEBRIDEMENT OF FOOT  6/5/2019    Procedure: DEBRIDEMENT, FOOT;  Surgeon: Mitch Wall MD;  Location: [facility] OR;  Service: Vascular;;    EXAMINATION UNDER ANESTHESIA Left 12/5/2018    Procedure: Exam under anesthesia, left foot debridement, washout and all other indicated procedures;  Surgeon: Mitch Wall MD;  Location: [facility] OR;  Service: Vascular;  Laterality: Left;  1030AM START  RN PREOP 12/3/2018-----AICD  SEEN BY DR JAMES 12/4    EXAMINATION UNDER ANESTHESIA Left 2/13/2019    Procedure: LEFT FOOT WOUND DEBRIDEMENT, WASHOUT AND ALL OTHER INDICATED PROCEDURES;  Surgeon: Mitch Wall MD;  Location: [facility] OR;  Service: Vascular;  Laterality: Left;  requesting 1st case start  THIS CASE 1ST PER DR WALL ON 2/1/19 @ 844AM -LO  RN PREOP 2/6/2019  HAS CARDIAC CLEARANCE    EXAMINATION UNDER ANESTHESIA Left 12/28/2018    Procedure: Exam under anesthesia, left foot debridement, left foot washout, possible left second through fifth metatarsal resection;  Surgeon: Mitch Wall MD;  Location: [facility] OR;  Service: Vascular;  Laterality: Left;  1:00pm start per julissa @ 8:58am  RN  PHONE PRE OP 12-27-18--=Pt coming from [facility] on [address]  NEED CONSENT    EXAMINATION UNDER ANESTHESIA Left 6/5/2019    Procedure: LEFT FOOT DEBRIDEMENT, WASHOUT AND ALL OTHER INDICATED PROCEDURES;  Surgeon: Mitch Wall MD;  Location: [facility] OR;  Service: Vascular;  Laterality: Left;  RN PRE OP 5-31-19------ICD------NEED CONSENT    INCISION AND DRAINAGE Left 11/14/2018     Procedure: Incision and Drainage, left lower extremity debridement, washout;  Surgeon: Mitch Chan MD;  Location: [facility] OR;  Service: Vascular;  Laterality: Left;    INCISION AND DRAINAGE Left 11/16/2018    Procedure: INCISION AND DRAINAGE;  Surgeon: Mitch Chan MD;  Location: [facility] OR;  Service: Vascular;  Laterality: Left;    INTRAOCULAR PROSTHESES INSERTION Right 9/26/2018    Procedure: INSERTION, IOL PROSTHESIS;  Surgeon: Perla Cortés MD;  Location: [facility] OR [address] [place] [address];  Service: Ophthalmology;  Laterality: Right;    PERITONEOCENTESIS N/A 3/1/2019    Procedure: PARACENTESIS, ABDOMINAL, INITIAL;  Surgeon: Dosc Diagnostic Provider;  Location: [facility] OR;  Service: Radiology;  Laterality: N/A;    PERITONEOCENTESIS N/A 3/25/2019    Procedure: PARACENTESIS, ABDOMINAL, INITIAL;  Surgeon: Dosc Diagnostic Provider;  Location: [facility] OR;  Service: Radiology;  Laterality: N/A;    PERITONEOCENTESIS N/A 7/22/2019    Procedure: PARACENTESIS, ABDOMINAL, INITIAL;  Surgeon: Dosc Diagnostic Provider;  Location: [facility] OR;  Service: Radiology;  Laterality: N/A;    PERITONEOCENTESIS N/A 8/16/2019    Procedure: PARACENTESIS, ABDOMINAL, INITIAL;  Surgeon: Dosc Diagnostic Provider;  Location: [facility] OR;  Service: Radiology;  Laterality: N/A;    PERITONEOCENTESIS N/A 9/26/2019    Procedure: PARACENTESIS, ABDOMINAL;  Surgeon: Dosc Diagnostic Provider;  Location: [facility] OR;  Service: Radiology;  Laterality: N/A;    PERITONEOCENTESIS N/A 10/11/2019    Procedure: PARACENTESIS, ABDOMINAL;  Surgeon: Dosc Diagnostic Provider;  Location: [facility] OR;  Service: Radiology;  Laterality: N/A;    PERITONEOCENTESIS N/A 10/31/2019    Procedure: PARACENTESIS, ABDOMINAL;  Surgeon: Dosc Diagnostic Provider;  Location: [facility] OR;  Service: Radiology;  Laterality: N/A;    PERITONEOCENTESIS N/A 11/18/2019    Procedure: PARACENTESIS, ABDOMINAL;  Surgeon: Dosc Diagnostic Provider;  Location: [facility] OR;  Service: Radiology;  Laterality: N/A;     PERITONEOCENTESIS N/A 12/16/2019    Procedure: PARACENTESIS, ABDOMINAL;  Surgeon: Dosc Diagnostic Provider;  Location: [facility] OR;  Service: Radiology;  Laterality: N/A;  2PM START    PERITONEOCENTESIS N/A 2/7/2020    Procedure: PARACENTESIS, ABDOMINAL;  Surgeon: Dosc Diagnostic Provider;  Location: [facility] OR;  Service: Radiology;  Laterality: N/A;  REQUESTED 10:30A START    PERITONEOCENTESIS N/A 2/19/2020    Procedure: PARACENTESIS, ABDOMINAL;  Surgeon: Dosc Diagnostic Provider;  Location: [facility] OR;  Service: Radiology;  Laterality: N/A;    PERITONEOCENTESIS N/A 2/28/2020    Procedure: PARACENTESIS, ABDOMINAL;  Surgeon: Dosc Diagnostic Provider;  Location: [facility] OR;  Service: Radiology;  Laterality: N/A;  REQUESTED 10AM START    PHACOEMULSIFICATION OF CATARACT Right 9/26/2018    Procedure: PHACOEMULSIFICATION, CATARACT;  Surgeon: Perla Cortés MD;  Location: [address];  Service: Ophthalmology;  Laterality: Right;    REPEAT ABDOMINAL PARACENTESIS N/A 2/11/2019    Procedure: PARACENTESIS, ABDOMINAL, REPEAT;  Surgeon: Dosc Diagnostic Provider;  Location: [facility] OR;  Service: Radiology;  Laterality: N/A;  1PM START    REPEAT ABDOMINAL PARACENTESIS N/A 9/12/2019    Procedure: PARACENTESIS, ABDOMINAL, REPEAT;  Surgeon: Dosc Diagnostic Provider;  Location: [facility] OR;  Service: Radiology;  Laterality: N/A;  9AM START    REPEAT ABDOMINAL PARACENTESIS N/A 12/2/2019    Procedure: PARACENTESIS, ABDOMINAL, REPEAT;  Surgeon: Dosc Diagnostic Provider;  Location: [facility] OR;  Service: Radiology;  Laterality: N/A;  3PM START    REPEAT ABDOMINAL PARACENTESIS N/A 1/10/2020    Procedure: PARACENTESIS, ABDOMINAL, REPEAT;  Surgeon: Dosc Diagnostic Provider;  Location: [facility] OR;  Service: Radiology;  Laterality: N/A;  1130AM START    REPEAT ABDOMINAL PARACENTESIS N/A 1/20/2020    Procedure: PARACENTESIS, ABDOMINAL, REPEAT;  Surgeon: Dosc Diagnostic Provider;  Location: [facility] OR;  Service: Radiology;  Laterality: N/A;  1PM START     REPEAT ABDOMINAL PARACENTESIS N/A 1/31/2020    Procedure: PARACENTESIS, ABDOMINAL, REPEAT;  Surgeon: Dosc Diagnostic Provider;  Location: [facility] OR;  Service: Radiology;  Laterality: N/A;  10AM START    REPEAT ABDOMINAL PARACENTESIS N/A 3/16/2020    Procedure: PARACENTESIS, ABDOMINAL, REPEAT;  Surgeon: Dosc Diagnostic Provider;  Location: [facility] OR;  Service: Radiology;  Laterality: N/A;  10AM START    REPEAT ABDOMINAL PARACENTESIS N/A 3/30/2020    Procedure: PARACENTESIS, ABDOMINAL, REPEAT;  Surgeon: Dosc Diagnostic Provider;  Location: [facility] OR;  Service: Radiology;  Laterality: N/A;    REPEAT ABDOMINAL PARACENTESIS N/A 4/9/2020    Procedure: PARACENTESIS, ABDOMINAL, REPEAT;  Surgeon: Dos Diagnostic Provider;  Location: [facility] OR;  Service: Radiology;  Laterality: N/A;  1130AM START    REPEAT ABDOMINAL PARACENTESIS N/A 5/8/2020    Procedure: PARACENTESIS, ABDOMINAL, REPEAT;  Surgeon: Dosc Diagnostic Provider;  Location: [facility] OR;  Service: Radiology;  Laterality: N/A;  9AM START    REPEAT ABDOMINAL PARACENTESIS N/A 5/21/2020    Procedure: PARACENTESIS, ABDOMINAL, REPEAT;  Surgeon: Dos Diagnostic Provider;  Location: [facility] OR;  Service: Radiology;  Laterality: N/A;  10AM START    REPEAT ABDOMINAL PARACENTESIS N/A 6/5/2020    Procedure: PARACENTESIS, ABDOMINAL, REPEAT;  Surgeon: Dosc Diagnostic Provider;  Location: [facility] OR;  Service: Radiology;  Laterality: N/A;  NOON START    REPEAT ABDOMINAL PARACENTESIS N/A 7/10/2020    Procedure: PARACENTESIS, ABDOMINAL, REPEAT;  Surgeon: Dosc Diagnostic Provider;  Location: [facility] OR;  Service: Radiology;  Laterality: N/A;  1PM START    REPEAT ABDOMINAL PARACENTESIS N/A 8/20/2020    Procedure: PARACENTESIS, ABDOMINAL, REPEAT;  Surgeon: Dosc Diagnostic Provider;  Location: [facility];  Service: Radiology;  Laterality: N/A;  1PM START    REPEAT ABDOMINAL PARACENTESIS N/A 9/4/2020    Procedure: PARACENTESIS, ABDOMINAL, REPEAT;  Surgeon: Dosc Diagnostic Provider;  Location: [facility] OR;   Service: Radiology;  Laterality: N/A;  11 AM START    REPEAT ABDOMINAL PARACENTESIS N/A 9/16/2020    Procedure: PARACENTESIS, ABDOMINAL, REPEAT;  Surgeon: [facility] Diagnostic Provider;  Location: [facility] OR;  Service: Radiology;  Laterality: N/A;  START 2:00 P.M.    REPEAT ABDOMINAL PARACENTESIS N/A 9/28/2020    Procedure: PARACENTESIS, ABDOMINAL, REPEAT;  Surgeon: [facility] Diagnostic Provider;  Location: [facility] OR;  Service: Radiology;  Laterality: N/A;  1 P.M. START    REVISION OF PROCEDURE INVOLVING GLAUCOMA DRAINAGE DEVICE Left 10/2/2019    EXTRUDING BAERVELDT /WITH PERICARDIAL PATCH GRAFT ()    Right foot surgery  10/2014    TOE AMPUTATION Right     first and second    TOE AMPUTATION Left 11/16/2018    Procedure: AMPUTATION, TOES  2-5;  Surgeon: Mitch Chan MD;  Location: [facility] OR;  Service: Vascular;  Laterality: Left;    TOE AMPUTATION Left 12/5/2018    Procedure: AMPUTATION, TOE;  Surgeon: Mitch Chan MD;  Location: [facility] OR;  Service: Vascular;  Laterality: Left;  left great toe    TONSILLECTOMY       Home Meds:   Prior to Admission medications    Medication Sig Start Date End Date Taking? Authorizing Provider   allopurinoL (ZYLOPRIM) 300 MG tablet Take 1 tablet (300 mg total) by mouth once daily. 10/6/20   Rubén Davis MD   aspirin (ECOTRIN) 81 MG EC tablet Take 81 mg by mouth once daily.    Historical Provider   bisacodyl (DULCOLAX) 5 mg EC tablet Take 5 mg by mouth daily as needed for Constipation.    Historical Provider   brimonidine 0.2% (ALPHAGAN) 0.2 % Drop Place 1 drop into both eyes 3 (three) times daily. 12/12/19   Perla Cortés MD   carvediloL (COREG) 3.125 MG tablet TAKE 1 TABLET(3.125 MG) BY MOUTH TWICE DAILY 8/17/20   Rubén Davis MD   coenzyme Q10 100 mg capsule Take 100 mg by mouth every morning.    Historical Provider   collagenase (SANTYL) ointment Apply topically once daily. 10/20/20   Rubén Davis MD   dorzolamide-timolol 2-0.5% (COSOPT) 22.3-6.8  "mg/mL ophthalmic solution Place 1 drop into both eyes 3 (three) times daily. 12/12/19   Perla Cortés MD   ezetimibe (ZETIA) 10 mg tablet TAKE 1 TABLET(10 MG) BY MOUTH EVERY DAY 7/16/20   Rubén Davis MD   fish oil-omega-3 fatty acids 300-1,000 mg capsule Take 1 capsule by mouth 2 (two) times daily. 1/23/19   Sara Ching MD   gabapentin (NEURONTIN) 100 MG capsule TAKE 2 CAPSULES(200 MG) BY MOUTH EVERY EVENING 9/21/20   Rubén Davis MD   HYDROcodone-acetaminophen (NORCO) 7.5-325 mg per tablet Take 1 tablet by mouth every 8 (eight) hours as needed for Pain. 10/20/20   Rubén Davis MD   insulin (LANTUS SOLOSTAR U-100 INSULIN) glargine 100 units/mL (3mL) SubQ pen Inject 35 units once daily 9/11/20   Sheryl Madison NP   insulin degludec-liraglutide (XULTOPHY 100/3.6) 100 unit-3.6 mg /mL (3 mL) InPn Inject 35 Units into the skin once daily.    Historical Provider   MULTIVIT,THER IRON,CA,FA & MIN (MULTIVITAMIN) Tab Take 1 tablet by mouth every morning.     Historical Provider   pen needle, diabetic (BD ULTRA-FINE AMADOR PEN NEEDLE) 32 gauge x 5/32" Ndle 1 Device by Misc.(Non-Drug; Combo Route) route 2 (two) times a day. 9/11/20   Sheryl Madison NP   torsemide (DEMADEX) 20 MG Tab Take 4 tablets (80 mg total) by mouth 2 (two) times daily. 6/16/20 10/27/20  Rubén Davis MD     Anticoagulants/Antiplatelets: no anticoagulation    Allergies:   Review of patient's allergies indicates:   Allergen Reactions    Statins-hmg-coa reductase inhibitors      Generalized Pain    Onglyza [saxagliptin]     Penicillins Rash     Sedation History:  no adverse reactions     Review of Systems:   Hematological: no known coagulopathies  Respiratory: positive for - orthopnea and shortness of breath  Cardiovascular: positive for - dyspnea on exertion, orthopnea and shortness of breath  Gastrointestinal: positive for - abdominal pain and distension  Genito-Urinary: no dysuria, trouble voiding, or hematuria  Musculoskeletal: " negative  Neurological: no TIA or stroke symptoms      OBJECTIVE:     Vital Signs (Most Recent)     Physical Exam:  General: no acute distress  Mental Status: alert and oriented to person, place and time  HEENT: normocephalic, atraumatic  Chest: mild labored breathing  Heart: regular heart rate  Abdomen: +distended. No TTP/r/g.  Extremity: moves all extremities    Laboratory  Lab Results   Component Value Date    INR 1.1 05/31/2019       Lab Results   Component Value Date    WBC 6.66 10/27/2020    HGB 11.5 (L) 10/27/2020    HCT 36.0 (L) 10/27/2020    MCV 90 10/27/2020     10/27/2020      Lab Results   Component Value Date    GLU 87 10/27/2020     10/27/2020    K 4.6 10/27/2020     10/27/2020    CO2 29 10/27/2020    BUN 59 (H) 10/27/2020    CREATININE 1.5 (H) 10/27/2020    CALCIUM 8.9 10/27/2020    MG 2.1 05/31/2019    ALT 12 10/27/2020    AST 15 10/27/2020    ALBUMIN 2.7 (L) 10/27/2020    BILITOT 0.6 10/27/2020     ASSESSMENT/PLAN:     61 y/o M with CKD III and combined systolic/diastolic CHF complicated by recurrent abdominal distension and pain 2/2 recurrent painful, tense ascites without TTP on PE to suggest SBP, requiring frequent large-volume therapeutic paracentesis.      1. Recurrent painful, tense ascites - Will attempt U/S-guided therapeutic paracentesis with local anesthetic only and albumin infusion post PRN per institutional protocol.      Risks (including, but not limited to, pain, bleeding, infection, damage to nearby structures, failure to obtain sufficient material for a diagnosis, the need for additional procedures, and death), benefits, and alternatives were discussed with the patient. All questions were answered to the best of my abilities. The patient wishes to proceed with the procedure. Written informed consent was obtained.     Thank you for considering IR for the care of your patient.      Zach Cha MD  Interventional Radiology

## 2020-11-03 NOTE — DISCHARGE SUMMARY
Radiology Discharge Summary      Hospital Course: No complications    Admit Date: 11/3/2020  Discharge Date: 11/03/2020     Instructions Given to Patient: Yes    Diet: Resume prior diet     Activity: activity as tolerated    Description of Condition on Discharge: Stable    Vital Signs (Most Recent): BP: 109/64 (11/03/20 1156)    Discharge Disposition: Home    Discharge Diagnosis:   61 y/o M with h/o recurrent painful, tense ascites s/p successful U/S-guided therapeutic LVP with local anesthetic only and albumin infusion post PRN as indicated per institutional protocol. Patient tolerated the procedure well. No immediate post-procedural complications noted.      Thank you for considering IR for the care of your patient.      Zach Cha MD  Interventional Radiology
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
## 2020-11-04 ENCOUNTER — TELEPHONE (OUTPATIENT)
Dept: VASCULAR SURGERY | Facility: CLINIC | Age: 60
End: 2020-11-04

## 2020-11-04 NOTE — TELEPHONE ENCOUNTER
Call to sister. Explained Dr. Chan wanted to set up her brother's surgery for the 9th. Explained that he would not need a preop since he already did it before his last scheduled surgery. Did give him date and time of COVID test Friday before the surgery.    F10.10

## 2020-11-04 NOTE — TELEPHONE ENCOUNTER
----- Message from Elis Santacruz sent at 11/4/2020  9:37 AM CST -----  Regarding: Chloé/ Spouse/ [phone]  Type: Patient Call Back    Who called:  Spouse    What is the request in detail:  Spouse returned staffs call and would like a call back.  Thank you    Would the patient rather a call back or a response via [facility]?   Call back    Best call back number:   [phone]    Thank you

## 2020-11-05 DIAGNOSIS — I50.43 ACUTE ON CHRONIC COMBINED SYSTOLIC AND DIASTOLIC CONGESTIVE HEART FAILURE: ICD-10-CM

## 2020-11-05 RX ORDER — TORSEMIDE 20 MG/1
80 TABLET ORAL 2 TIMES DAILY
Qty: 240 TABLET | Refills: 2 | Status: SHIPPED | OUTPATIENT
Start: 2020-11-05 | End: 2021-02-10 | Stop reason: SDUPTHER

## 2020-11-06 ENCOUNTER — HOSPITAL ENCOUNTER (OUTPATIENT)
Dept: PREADMISSION TESTING | Facility: HOSPITAL | Age: 60
Discharge: HOME | End: 2020-11-06
Attending: SURGERY
Payer: MEDICAID

## 2020-11-06 DIAGNOSIS — Z01.818 PREOP TESTING: ICD-10-CM

## 2020-11-06 LAB — SARS-COV-2 RDRP RESP QL NAA+PROBE: NEGATIVE

## 2020-11-06 PROCEDURE — U0002 COVID-19 LAB TEST NON-CDC: HCPCS

## 2020-11-09 ENCOUNTER — HOSPITAL ENCOUNTER (OUTPATIENT)
Facility: HOSPITAL | Age: 60
Discharge: HOME | End: 2020-11-09
Attending: SURGERY | Admitting: SURGERY
Payer: MEDICAID

## 2020-11-09 VITALS
SYSTOLIC BLOOD PRESSURE: 105 MMHG | RESPIRATION RATE: 16 BRPM | HEART RATE: 63 BPM | WEIGHT: 189 LBS | OXYGEN SATURATION: 97 % | TEMPERATURE: 98 F | HEIGHT: 70 IN | DIASTOLIC BLOOD PRESSURE: 57 MMHG | BODY MASS INDEX: 27.06 KG/M2

## 2020-11-09 DIAGNOSIS — Z01.818 PREOP TESTING: ICD-10-CM

## 2020-11-09 DIAGNOSIS — I70.233 ATHEROSCLEROSIS OF NATIVE ARTERY OF RIGHT LOWER EXTREMITY WITH ULCERATION OF ANKLE: Primary | ICD-10-CM

## 2020-11-09 LAB
GRAM STN SPEC: NORMAL
GRAM STN SPEC: NORMAL
POCT GLUCOSE: 86 MG/DL (ref 70–110)

## 2020-11-09 PROCEDURE — 87102 FUNGUS ISOLATION CULTURE: CPT

## 2020-11-09 PROCEDURE — 71000015 HC POSTOP RECOV 1ST HR: Performed by: SURGERY

## 2020-11-09 PROCEDURE — 36000705 HC OR TIME LEV I EA ADD 15 MIN: Performed by: SURGERY

## 2020-11-09 PROCEDURE — 76942 ECHO GUIDE FOR BIOPSY: CPT | Performed by: ANESTHESIOLOGY

## 2020-11-09 PROCEDURE — 87075 CULTR BACTERIA EXCEPT BLOOD: CPT

## 2020-11-09 PROCEDURE — 63600175 PHARM REV CODE 636 W HCPCS: Performed by: NURSE ANESTHETIST, CERTIFIED REGISTERED

## 2020-11-09 PROCEDURE — 87116 MYCOBACTERIA CULTURE: CPT

## 2020-11-09 PROCEDURE — D9220A PRA ANESTHESIA: Mod: CRNA,,, | Performed by: NURSE ANESTHETIST, CERTIFIED REGISTERED

## 2020-11-09 PROCEDURE — D9220A PRA ANESTHESIA: Mod: ANES,,, | Performed by: ANESTHESIOLOGY

## 2020-11-09 PROCEDURE — 63600175 PHARM REV CODE 636 W HCPCS: Performed by: ANESTHESIOLOGY

## 2020-11-09 PROCEDURE — 87186 SC STD MICRODIL/AGAR DIL: CPT | Mod: 59

## 2020-11-09 PROCEDURE — 87206 SMEAR FLUORESCENT/ACID STAI: CPT

## 2020-11-09 PROCEDURE — 36000704 HC OR TIME LEV I 1ST 15 MIN: Performed by: SURGERY

## 2020-11-09 PROCEDURE — 11043 PR DEBRIDEMENT, SKIN, SUB-Q TISSUE,MUSCLE,=<20 SQ CM: ICD-10-PCS | Mod: ,,, | Performed by: SURGERY

## 2020-11-09 PROCEDURE — 37000009 HC ANESTHESIA EA ADD 15 MINS: Performed by: SURGERY

## 2020-11-09 PROCEDURE — 82962 GLUCOSE BLOOD TEST: CPT | Performed by: SURGERY

## 2020-11-09 PROCEDURE — D9220A PRA ANESTHESIA: ICD-10-PCS | Mod: ANES,,, | Performed by: ANESTHESIOLOGY

## 2020-11-09 PROCEDURE — 11043 DBRDMT MUSC&/FSCA 1ST 20/<: CPT | Mod: ,,, | Performed by: SURGERY

## 2020-11-09 PROCEDURE — 25000003 PHARM REV CODE 250: Performed by: ANESTHESIOLOGY

## 2020-11-09 PROCEDURE — 37000008 HC ANESTHESIA 1ST 15 MINUTES: Performed by: SURGERY

## 2020-11-09 PROCEDURE — 01470 ANES PX NRV MSC LW L/A/F NOS: CPT | Performed by: SURGERY

## 2020-11-09 PROCEDURE — 87205 SMEAR GRAM STAIN: CPT

## 2020-11-09 PROCEDURE — 87070 CULTURE OTHR SPECIMN AEROBIC: CPT

## 2020-11-09 PROCEDURE — 25000003 PHARM REV CODE 250: Performed by: SURGERY

## 2020-11-09 PROCEDURE — 87077 CULTURE AEROBIC IDENTIFY: CPT | Mod: 59

## 2020-11-09 PROCEDURE — D9220A PRA ANESTHESIA: ICD-10-PCS | Mod: CRNA,,, | Performed by: NURSE ANESTHETIST, CERTIFIED REGISTERED

## 2020-11-09 RX ORDER — FENTANYL CITRATE 50 UG/ML
INJECTION, SOLUTION INTRAMUSCULAR; INTRAVENOUS
Status: DISCONTINUED | OUTPATIENT
Start: 2020-11-09 | End: 2020-11-09

## 2020-11-09 RX ORDER — MIDAZOLAM HYDROCHLORIDE 1 MG/ML
INJECTION, SOLUTION INTRAMUSCULAR; INTRAVENOUS
Status: DISCONTINUED | OUTPATIENT
Start: 2020-11-09 | End: 2020-11-09

## 2020-11-09 RX ORDER — CLINDAMYCIN PHOSPHATE 900 MG/50ML
900 INJECTION, SOLUTION INTRAVENOUS
Status: DISCONTINUED | OUTPATIENT
Start: 2020-11-09 | End: 2020-11-09 | Stop reason: HOSPADM

## 2020-11-09 RX ORDER — LIDOCAINE HYDROCHLORIDE 10 MG/ML
1 INJECTION, SOLUTION EPIDURAL; INFILTRATION; INTRACAUDAL; PERINEURAL ONCE
Status: DISCONTINUED | OUTPATIENT
Start: 2020-11-09 | End: 2020-11-09 | Stop reason: HOSPADM

## 2020-11-09 RX ORDER — OXYCODONE AND ACETAMINOPHEN 5; 325 MG/1; MG/1
1 TABLET ORAL EVERY 8 HOURS PRN
Qty: 15 TABLET | Refills: 0 | Status: ON HOLD | OUTPATIENT
Start: 2020-11-09 | End: 2020-11-18 | Stop reason: HOSPADM

## 2020-11-09 RX ORDER — ROPIVACAINE HYDROCHLORIDE 5 MG/ML
INJECTION, SOLUTION EPIDURAL; INFILTRATION; PERINEURAL
Status: DISCONTINUED | OUTPATIENT
Start: 2020-11-09 | End: 2020-11-09

## 2020-11-09 RX ORDER — SODIUM CHLORIDE 9 MG/ML
INJECTION, SOLUTION INTRAVENOUS CONTINUOUS
Status: DISCONTINUED | OUTPATIENT
Start: 2020-11-09 | End: 2020-11-09 | Stop reason: HOSPADM

## 2020-11-09 RX ADMIN — FENTANYL CITRATE 25 MCG: 50 INJECTION INTRAMUSCULAR; INTRAVENOUS at 12:11

## 2020-11-09 RX ADMIN — CLINDAMYCIN IN 5 PERCENT DEXTROSE 900 MG: 18 INJECTION, SOLUTION INTRAVENOUS at 11:11

## 2020-11-09 RX ADMIN — MIDAZOLAM HYDROCHLORIDE 1 MG: 1 INJECTION, SOLUTION INTRAMUSCULAR; INTRAVENOUS at 11:11

## 2020-11-09 RX ADMIN — ROPIVACAINE HYDROCHLORIDE 10 ML: 5 INJECTION, SOLUTION EPIDURAL; INFILTRATION; PERINEURAL at 11:11

## 2020-11-09 RX ADMIN — SODIUM CHLORIDE: 0.9 INJECTION, SOLUTION INTRAVENOUS at 10:11

## 2020-11-09 RX ADMIN — FENTANYL CITRATE 50 MCG: 50 INJECTION INTRAMUSCULAR; INTRAVENOUS at 12:11

## 2020-11-09 NOTE — TRANSFER OF CARE
"Anesthesia Transfer of Care Note    Patient: Marvin Ray    Procedure(s) Performed: Procedure(s) (LRB):  RIGHT FOOT DEBRIDEMENT, WASHOUT AND ALL OTHER INDICATED PROCEDURES (Right)    Patient location: [facility]    Anesthesia Type: regional and MAC    Transport from OR: Transported from OR on room air with adequate spontaneous ventilation    Post pain: adequate analgesia    Post assessment: no apparent anesthetic complications and tolerated procedure well    Post vital signs: stable    Level of consciousness: awake, alert and oriented    Nausea/Vomiting: no nausea/vomiting    Complications: none    Transfer of care protocol was followed      Last vitals:   Visit Vitals  /64 (BP Location: Right arm)   Pulse 61   Temp 36.5 °C (97.7 °F) (Oral)   Resp 17   Ht 5' 10" (1.778 m)   Wt 85.7 kg (189 lb)   SpO2 96%   BMI 27.12 kg/m²     "

## 2020-11-09 NOTE — ANESTHESIA POSTPROCEDURE EVALUATION
Anesthesia Post Evaluation    Patient: Marvin Ray    Procedure(s) Performed: Procedure(s) (LRB):  RIGHT FOOT DEBRIDEMENT, WASHOUT AND ALL OTHER INDICATED PROCEDURES (Right)    Final Anesthesia Type: regional    Patient location during evaluation: [facility]  Patient participation: Yes- Able to Participate  Level of consciousness: awake and alert  Post-procedure vital signs: reviewed and stable  Pain management: adequate  Airway patency: patent    PONV status at discharge: No PONV  Anesthetic complications: no      Cardiovascular status: blood pressure returned to baseline and hemodynamically stable  Respiratory status: unassisted and spontaneous ventilation  Hydration status: euvolemic  Follow-up not needed.          Vitals Value Taken Time   /57 11/09/20 1400   Temp 36.4 °C (97.5 °F) 11/09/20 1400   Pulse 63 11/09/20 1400   Resp 16 11/09/20 1400   SpO2 97 % 11/09/20 1400         No case tracking events are documented in the log.      Pain/Ralf Score: Ralf Score: 10 (11/9/2020  1:20 PM)  Modified Ralf Score: 20 (11/9/2020  2:12 PM)

## 2020-11-09 NOTE — OP NOTE
[facility]  Operative Note     Surgery Date: 11/9/2020      Surgeon(s) and Role:     * Mitch Chan MD - Primary     Assisting Surgeon:   1. Xiang Singh MS3     Pre-op Diagnosis:   1. Atherosclerosis of native artery of right lower extremity with ulceration of ankle [I70.233]  2.        Patient Active Problem List     Diagnosis Date Noted    Atherosclerosis of native artery of right lower extremity with ulceration of ankle 11/09/2020    Statin intolerance 10/27/2020    Abnormal EKG 10/27/2020    Type 2 diabetes mellitus with right diabetic foot ulcer 09/29/2020    Diabetes mellitus due to underlying condition with foot ulcer, without long-term current use of insulin      Diabetic ulcer of toe of right foot associated with type 2 diabetes mellitus, with bone involvement without evidence of necrosis 08/11/2020    Diabetic ulcer of right lower leg 06/18/2020    Status post abdominal paracentesis 05/08/2020    Ischemic cardiomyopathy 01/20/2020    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 10/02/2019    CKD stage 3 due to type 2 diabetes mellitus 05/01/2019    Anemia due to multiple mechanisms 05/01/2019    Persistent proteinuria 05/01/2019    Atherosclerosis of left lower extremity with ulceration of midfoot 04/30/2019    Stage 3 chronic kidney disease 03/11/2019    Subacute osteomyelitis of left foot 01/14/2019    Debility 12/25/2018    MDR Acinetobacter baumannii infection 12/20/2018    Other ascites 12/12/2018    Type 2 diabetes mellitus with left diabetic foot ulcer 10/29/2018    Hepatosplenomegaly 10/12/2018    Right wrist pain 10/11/2018    PVD (peripheral vascular disease) 10/10/2018    Neovascular glaucoma, right eye, mild stage 10/09/2018    Statin myopathy 07/27/2018    Neovascular glaucoma of left eye, severe stage 02/15/2017    Coronary artery disease involving native coronary artery of native heart without angina pectoris 05/31/2016     Essential hypertension 05/06/2016    Tophaceous gout 10/22/2015    Neovascular glaucoma of both eyes 03/20/2015    DM type 2 with diabetic peripheral neuropathy 10/22/2014    Mixed hyperlipidemia 10/22/2014    Nuclear sclerotic cataract of right eye 08/28/2014    Pseudophakia, left eye 08/28/2014    Ptosis, left eyelid 08/28/2014    Epiretinal membrane, both eyes 08/22/2014            Post-op Diagnosis:  Post-Op Diagnosis Codes:     * Atherosclerosis of native artery of right lower extremity with ulceration of ankle [I70.233]     Procedure(s) (LRB):  1. Right leg wound, anterior lower limb, debridement  2. Right ankle wound debridement  3. Right heel wound debridement  4. Right foot wound washout    Procedure in detail:  The patient was seen in preop holding and was deemed stable by anesthesia.  The received a right ankle block for regional anesthesia.  Patient's vital signs are stable.  From the operating and placed supine on operating room table.  His right lower extremity was prepped and draped in sterile fashion.  Time-out performed.  A scrub brush was used to debride the right lower leg wound down to subcutaneous tissue and removed all poorly healing tissue.  Next a forecep was used to debride further as well as the right ankle wound down to level of muscle.  Next our attention was turned to the right heel wound.  Scalpel was used to remove the eschar to the subcutaneous fat.  The fat that was necrotic including some muscle was debrided with Metzenbaum scissors.  No deep infection or abscess was noted.  There was infectious changes to some fascia which was excised.  The skin edges were debrided and noted to bleed well.  The subcutaneous tissue also had moderate bleeding.  Cultures were obtained due to a odor being present.  The lateral foot wound was also debrided with a scalpel down to the level of the skin.     Anesthesia: Regional     Description of the findings of the procedure(s): no deep  infection, odor present with fat necrosis under eschar, no bone exposure; good bleeding to skin edges, minimal to muscle     Estimated Blood Loss: < 5cc         Specimens:       Specimen (12h ago, onward)     None

## 2020-11-09 NOTE — DISCHARGE INSTRUCTIONS
DIET: You may resume your home diet. If nausea is present, increase your diet gradually with fluids and bland foods    ACTIVITY LEVEL: You have received sedation or an anesthetic, you may feel sleepy for several hours. Rest until you are more awake. Gradually resume your normal activities    Medications: Pain medication should be taken only if needed and as directed. If antibiotics are prescribed, the medication should be taken until completed.     No driving, alcoholic beverages or signing legal documents for next 24 hours or while taking pain medication.       CALL THE DOCTOR:    For any obvious bleeding (some dried blood over the incision is normal).      Redness, swelling, foul smell around incision or fever over 101.   Shortness of breath, Coughing up Bloody sputum, Pains or Swelling in your Calves .   Persistent pain or nausea not relieved by medication.    If any unusual problems or difficulties occur contact your doctor. If you cannot contact your doctor but feel your signs and symptoms warrant a physicians attention return to the emergency room.  Fall Prevention  Millions of people fall every year and injure themselves. You may have had anesthesia or sedation which may increase your risk of falling. You may have health issues that put you at an increased risk of falling.     Here are ways to reduce your risk of falling.  ·   · Make your home safe by keeping walkways clear of objects you may trip over.  · Use non-slip pads under rugs. Do not use area rugs or small throw rugs.  · Use non-slip mats in bathtubs and showers.  · Install handrails and lights on staircases.  · Do not walk in poorly lit areas.  · Do not stand on chairs or wobbly ladders.  · Use caution when reaching overhead or looking upward. This position can cause a loss of balance.  · Be sure your shoes fit properly, have non-slip bottoms and are in good condition.   · Wear shoes both inside and out. Avoid going barefoot or wearing  slippers.  · Be cautious when going up and down stairs, curbs, and when walking on uneven sidewalks.  · If your balance is poor, consider using a cane or walker.  · If your fall was related to alcohol use, stop or limit alcohol intake.   · If your fall was related to use of sleeping medicines, talk to your doctor about this. You may need to reduce your dosage at bedtime if you awaken during the night to go to the bathroom.    · To reduce the need for nighttime bathroom trips:  ¨ Avoid drinking fluids for several hours before going to bed  ¨ Empty your bladder before going to bed  ¨ Men can keep a urinal at the bedside  · Stay as active as you can. Balance, flexibility, strength, and endurance all come from exercise. They all play a role in preventing falls. Ask your healthcare provider which types of activity are right for you.  · Get your vision checked on a regular basis.  · If you have pets, know where they are before you stand up or walk so you don't trip over them.  Use night lights.

## 2020-11-09 NOTE — INTERVAL H&P NOTE
The patient has been examined and the H&P has been reviewed:    I concur with the findings and no changes have occurred since H&P was written.    Surgery risks, benefits and alternative options discussed and understood by patient/family.          Active Hospital Problems    Diagnosis  POA    Atherosclerosis of native artery of right lower extremity with ulceration of ankle [I70.233]  Yes      Resolved Hospital Problems   No resolved problems to display.

## 2020-11-09 NOTE — ANESTHESIA PROCEDURE NOTES
Peripheral Block right popliteal    Patient location during procedure: holding area    Reason for block: primary anesthetic   Diagnosis: right foot ulcer   Start time: 11/9/2020 11:35 AM  Timeout: 11/9/2020 11:35 AM   End time: 11/9/2020 11:45 AM    Staffing  Authorizing Provider: Tam Reynolds MD  Performing Provider: Tam Reynolds MD    Preanesthetic Checklist  Completed: patient identified, site marked, surgical consent, pre-op evaluation, timeout performed, IV checked, risks and benefits discussed and monitors and equipment checked  Peripheral Block  Patient position: supine (with right knee bent)  Prep: ChloraPrep  Patient monitoring: heart rate, cardiac monitor, continuous pulse ox, continuous capnometry and frequent blood pressure checks  Block type: popliteal  Laterality: right  Injection technique: single shot  Needle  Needle type: Stimuplex   Needle gauge: 21 G  Needle length: 4 in  Needle localization: anatomical landmarks and ultrasound guidance   -ultrasound image captured on disc.  Assessment  Injection assessment: negative aspiration, negative parasthesia and local visualized surrounding nerve  Paresthesia pain: none  Heart rate change: no  Slow fractionated injection: yes  Additional Notes  VSS.  [facility] RN monitoring vitals throughout procedure.  Patient tolerated procedure well.

## 2020-11-09 NOTE — BRIEF OP NOTE
[facility]  Brief Operative Note    Surgery Date: 11/9/2020     Surgeon(s) and Role:     * Mitch Chan MD - Primary    Assisting Surgeon:   1. Xiang Singh MS3    Pre-op Diagnosis:   1. Atherosclerosis of native artery of right lower extremity with ulceration of ankle [I70.233]  2.   Patient Active Problem List    Diagnosis Date Noted    Atherosclerosis of native artery of right lower extremity with ulceration of ankle 11/09/2020    Statin intolerance 10/27/2020    Abnormal EKG 10/27/2020    Type 2 diabetes mellitus with right diabetic foot ulcer 09/29/2020    Diabetes mellitus due to underlying condition with foot ulcer, without long-term current use of insulin     Diabetic ulcer of toe of right foot associated with type 2 diabetes mellitus, with bone involvement without evidence of necrosis 08/11/2020    Diabetic ulcer of right lower leg 06/18/2020    Status post abdominal paracentesis 05/08/2020    Ischemic cardiomyopathy 01/20/2020    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 10/02/2019    CKD stage 3 due to type 2 diabetes mellitus 05/01/2019    Anemia due to multiple mechanisms 05/01/2019    Persistent proteinuria 05/01/2019    Atherosclerosis of left lower extremity with ulceration of midfoot 04/30/2019    Stage 3 chronic kidney disease 03/11/2019    Subacute osteomyelitis of left foot 01/14/2019    Debility 12/25/2018    MDR Acinetobacter baumannii infection 12/20/2018    Other ascites 12/12/2018    Type 2 diabetes mellitus with left diabetic foot ulcer 10/29/2018    Hepatosplenomegaly 10/12/2018    Right wrist pain 10/11/2018    PVD (peripheral vascular disease) 10/10/2018    Neovascular glaucoma, right eye, mild stage 10/09/2018    Statin myopathy 07/27/2018    Neovascular glaucoma of left eye, severe stage 02/15/2017    Coronary artery disease involving native coronary artery of native heart without angina pectoris 05/31/2016     Essential hypertension 05/06/2016    Tophaceous gout 10/22/2015    Neovascular glaucoma of both eyes 03/20/2015    DM type 2 with diabetic peripheral neuropathy 10/22/2014    Mixed hyperlipidemia 10/22/2014    Nuclear sclerotic cataract of right eye 08/28/2014    Pseudophakia, left eye 08/28/2014    Ptosis, left eyelid 08/28/2014    Epiretinal membrane, both eyes 08/22/2014         Post-op Diagnosis:  Post-Op Diagnosis Codes:     * Atherosclerosis of native artery of right lower extremity with ulceration of ankle [I70.233]    Procedure(s) (LRB):  RIGHT FOOT DEBRIDEMENT, WASHOUT AND ALL OTHER INDICATED PROCEDURES (Right)    Anesthesia: Regional    Description of the findings of the procedure(s): no deep infection, odor present with fat necrosis under eschar, no bone exposure; good bleeding to skin edges, minimal to muscle    Estimated Blood Loss: < 5cc         Specimens:   Specimen (12h ago, onward)    None            Discharge Note    OUTCOME: Patient tolerated treatment/procedure well without complication and is now ready for discharge.    DISPOSITION: Home or Self Care    FINAL DIAGNOSIS:  <principal problem not specified>    FOLLOWUP: In clinic    DISCHARGE INSTRUCTIONS:  No discharge procedures on file.

## 2020-11-09 NOTE — ANESTHESIA PROCEDURE NOTES
Peripheral Block right saphenous    Patient location during procedure: holding area    Reason for block: primary anesthetic   Diagnosis: right foot   Start time: 11/9/2020 11:46 AM  Timeout: 11/9/2020 11:46 AM   End time: 11/9/2020 11:49 AM    Staffing  Authorizing Provider: Tam Reynolds MD  Performing Provider: Tam Reynolds MD    Preanesthetic Checklist  Completed: patient identified, site marked, surgical consent, pre-op evaluation, timeout performed, IV checked, risks and benefits discussed and monitors and equipment checked  Peripheral Block  Patient position: supine  Prep: ChloraPrep  Patient monitoring: heart rate, cardiac monitor, continuous pulse ox, continuous capnometry and frequent blood pressure checks  Block type: saphenous  Laterality: right  Injection technique: single shot  Needle  Needle type: Stimuplex   Needle gauge: 21 G  Needle length: 4 in  Needle localization: anatomical landmarks and ultrasound guidance   -ultrasound image captured on disc.  Assessment  Injection assessment: negative aspiration, negative parasthesia and local visualized surrounding nerve  Paresthesia pain: none  Heart rate change: no  Slow fractionated injection: yes  Additional Notes  VSS.  [facility] RN monitoring vitals throughout procedure.  Patient tolerated procedure well.

## 2020-11-10 ENCOUNTER — TELEPHONE (OUTPATIENT)
Dept: VASCULAR SURGERY | Facility: CLINIC | Age: 60
End: 2020-11-10

## 2020-11-10 ENCOUNTER — HOSPITAL ENCOUNTER (OUTPATIENT)
Dept: WOUND CARE | Facility: HOSPITAL | Age: 60
Discharge: HOME | End: 2020-11-10
Attending: FAMILY MEDICINE
Payer: MEDICAID

## 2020-11-10 VITALS — SYSTOLIC BLOOD PRESSURE: 111 MMHG | HEART RATE: 75 BPM | TEMPERATURE: 98 F | DIASTOLIC BLOOD PRESSURE: 59 MMHG

## 2020-11-10 DIAGNOSIS — I83.009 VENOUS STASIS ULCER WITH EDEMA OF LOWER LEG: ICD-10-CM

## 2020-11-10 DIAGNOSIS — E11.621 TYPE 2 DIABETES MELLITUS WITH RIGHT DIABETIC FOOT ULCER: ICD-10-CM

## 2020-11-10 DIAGNOSIS — E11.621 DIABETIC ULCER OF TOE OF RIGHT FOOT ASSOCIATED WITH TYPE 2 DIABETES MELLITUS, WITH BONE INVOLVEMENT WITHOUT EVIDENCE OF NECROSIS: Primary | ICD-10-CM

## 2020-11-10 DIAGNOSIS — L97.909 VENOUS STASIS ULCER WITH EDEMA OF LOWER LEG: ICD-10-CM

## 2020-11-10 DIAGNOSIS — R60.9 VENOUS STASIS ULCER WITH EDEMA OF LOWER LEG: ICD-10-CM

## 2020-11-10 DIAGNOSIS — I70.244: ICD-10-CM

## 2020-11-10 DIAGNOSIS — L97.519 TYPE 2 DIABETES MELLITUS WITH RIGHT DIABETIC FOOT ULCER: ICD-10-CM

## 2020-11-10 DIAGNOSIS — I73.9 PVD (PERIPHERAL VASCULAR DISEASE): ICD-10-CM

## 2020-11-10 DIAGNOSIS — I83.899 VENOUS STASIS ULCER WITH EDEMA OF LOWER LEG: ICD-10-CM

## 2020-11-10 DIAGNOSIS — L97.516 DIABETIC ULCER OF TOE OF RIGHT FOOT ASSOCIATED WITH TYPE 2 DIABETES MELLITUS, WITH BONE INVOLVEMENT WITHOUT EVIDENCE OF NECROSIS: Primary | ICD-10-CM

## 2020-11-10 PROCEDURE — 99215 OFFICE O/P EST HI 40 MIN: CPT

## 2020-11-10 NOTE — PROGRESS NOTES
[facility]  [address]  [id number]  Nurse Visit    Subjective:       Patient seen in clinic today. OR debridement yesterday to Right Heel and Right Lower Leg per Vascular. Patient had dressing changes today only to all wounds as MD Davis would like patient to be seen by FRANCINE Mcgee this Friday in conjunction with Vascular MD Chan as wound likely requires more debridement.       Assessment:          Wound 11/02/18 Diabetic Ulcer Left medial Foot (Active)   11/02/18     Pre-existing: Yes   Primary Wound Type: Diabetic ulc   Side: Left   Orientation: medial   Location: Foot   Wound Number (optional):    Ankle-Brachial Index:    Pulses:    Removal Indication and Assessment:    Wound Outcome:    (Retired) Wound Type:    (Retired) Wound Length (cm):    (Retired) Wound Width (cm):    (Retired) Depth (cm):    Wound Description (Comments):    Removal Indications:    Wound Image   11/10/20 1300   Wound WDL ex 11/10/20 1300   Dressing Appearance Moist drainage 11/10/20 1300   Drainage Amount Large 11/10/20 1300   Drainage Characteristics/Odor Serosanguineous;Green 11/10/20 1300   Appearance Red;Yellow;Slough 11/10/20 1300   Tissue loss description Full thickness 11/10/20 1300   Red (%), Wound Tissue Color 90 % 11/10/20 1300   Yellow (%), Wound Tissue Color 10 % 11/10/20 1300   Periwound Area Scar tissue 11/10/20 1300   Wound Edges Open 11/10/20 1300   Wound Length (cm) 9.1 cm 11/10/20 1300   Wound Width (cm) 6.2 cm 11/10/20 1300   Wound Depth (cm) 0.2 cm 11/10/20 1300   Wound Volume (cm^3) 11.28 cm^3 11/10/20 1300   Wound Surface Area (cm^2) 56.42 cm^2 11/10/20 1300   Care Cleansed with:;Soap and water;Antimicrobial agent 11/10/20 1300   Dressing Changed;Other (see comments);Methylene blue/gentian violet;Cast padding;Tubular bandage 11/10/20 1300   Periwound Care Moisture barrier applied 11/10/20 1300   Off Loading Football dressing 11/10/20 1300            Wound 05/08/20 1015 Diabetic  Ulcer Left distal Foot (Active)   05/08/20 1015    Pre-existing: Yes   Primary Wound Type: Diabetic ulc   Side: Left   Orientation: distal   Location: Foot   Wound Number (optional):    Ankle-Brachial Index:    Pulses:    Removal Indication and Assessment:    Wound Outcome:    (Retired) Wound Type:    (Retired) Wound Length (cm):    (Retired) Wound Width (cm):    (Retired) Depth (cm):    Wound Description (Comments):    Removal Indications:    Wound Image   11/10/20 1300   Wound WDL ex 11/10/20 1300   Dressing Appearance Moist drainage 11/10/20 1300   Drainage Amount Large 11/10/20 1300   Drainage Characteristics/Odor Serosanguineous;Green 11/10/20 1300   Appearance Red;Pink;Yellow;Slough 11/10/20 1300   Tissue loss description Full thickness 11/10/20 1300   Red (%), Wound Tissue Color 90 % 11/10/20 1300   Yellow (%), Wound Tissue Color 10 % 11/10/20 1300   Periwound Area Scar tissue 11/10/20 1300   Wound Edges Open 11/10/20 1300   Wound Length (cm) 3.1 cm 11/10/20 1300   Wound Width (cm) 7.3 cm 11/10/20 1300   Wound Depth (cm) 0.15 cm 11/10/20 1300   Wound Volume (cm^3) 3.39 cm^3 11/10/20 1300   Wound Surface Area (cm^2) 22.63 cm^2 11/10/20 1300   Care Cleansed with:;Antimicrobial agent;Soap and water 11/10/20 1300   Dressing Changed;Methylene blue/gentian violet;Other (see comments);Cast padding;Tubular bandage 11/10/20 1300   Periwound Care Moisture barrier applied 11/10/20 1300   Off Loading Football dressing 11/10/20 1300            Wound 08/11/20 1051 Diabetic Ulcer Right medial Malleolus/Ankle (Active)   08/11/20 1051    Pre-existing: No   Primary Wound Type: Diabetic ulc   Side: Right   Orientation: medial   Location: Malleolus/Ankle   Wound Number (optional):    Ankle-Brachial Index:    Pulses:    Removal Indication and Assessment:    Wound Outcome:    (Retired) Wound Type:    (Retired) Wound Length (cm):    (Retired) Wound Width (cm):    (Retired) Depth (cm):    Wound Description (Comments):    Removal  Indications:    Wound Image   11/10/20 1300   Wound WDL ex 11/10/20 1300   Dressing Appearance Moist drainage 11/10/20 1300   Drainage Amount Small 11/10/20 1300   Drainage Characteristics/Odor Serosanguineous 11/10/20 1300   Appearance Red;Granulating 11/10/20 1300   Tissue loss description Full thickness 11/10/20 1300   Red (%), Wound Tissue Color 100 % 11/10/20 1300   Wound Edges Open 11/10/20 1300   Wound Length (cm) 2.2 cm 11/10/20 1300   Wound Width (cm) 2.2 cm 11/10/20 1300   Wound Depth (cm) 0.1 cm 11/10/20 1300   Wound Volume (cm^3) 0.48 cm^3 11/10/20 1300   Wound Surface Area (cm^2) 4.84 cm^2 11/10/20 1300   Care Cleansed with:;Soap and water;Antimicrobial agent 11/10/20 1300   Dressing Changed;Foam;Silver 11/10/20 1300   Periwound Care Moisture barrier applied 11/10/20 1300   Compression Tubular elasticized bandage 11/10/20 1300            Wound 08/11/20 1052 Diabetic Ulcer Right medial Heel (Active)   08/11/20 1052    Pre-existing: No   Primary Wound Type: Diabetic ulc   Side: Right   Orientation: medial   Location: Heel   Wound Number (optional):    Ankle-Brachial Index:    Pulses:    Removal Indication and Assessment:    Wound Outcome:    (Retired) Wound Type:    (Retired) Wound Length (cm):    (Retired) Wound Width (cm):    (Retired) Depth (cm):    Wound Description (Comments):    Removal Indications:    Wound Image   11/10/20 1300   Wound WDL ex 11/10/20 1300   Dressing Appearance Moist drainage 11/10/20 1300   Drainage Amount Large 11/10/20 1300   Drainage Characteristics/Odor Serosanguineous;Green;Malodorous 11/10/20 1300   Appearance Necrotic;Adipose;Yellow;Slough;Red 11/10/20 1300   Black (%), Wound Tissue Color 10 % 11/10/20 1300   Red (%), Wound Tissue Color 5 % 11/10/20 1300   Yellow (%), Wound Tissue Color 85 % 11/10/20 1300   Periwound Area Macerated 11/10/20 1300   Wound Edges Open 11/10/20 1300   Wound Length (cm) 8.5 cm 11/10/20 1300   Wound Width (cm) 10 cm 11/10/20 1300   Wound Depth  (cm) 1.1 cm 11/10/20 1300   Wound Volume (cm^3) 93.5 cm^3 11/10/20 1300   Wound Surface Area (cm^2) 85 cm^2 11/10/20 1300   Care Cleansed with:;Soap and water;Sterile normal saline 11/10/20 1300   Dressing Changed;Methylene blue/gentian violet;Other (see comments);Cast padding;Tubular bandage 11/10/20 1300   Compression Tubular elasticized bandage 11/10/20 1300   Off Loading Football dressing 11/10/20 1300            Wound 09/29/20 1045 Other (comment) Right medial Toe, fifth (Active)   09/29/20 1045    Pre-existing:    Primary Wound Type: Other   Side: Right   Orientation: medial   Location: Toe, fifth   Wound Number (optional):    Ankle-Brachial Index:    Pulses:    Removal Indication and Assessment:    Wound Outcome:    (Retired) Wound Type:    (Retired) Wound Length (cm):    (Retired) Wound Width (cm):    (Retired) Depth (cm):    Wound Description (Comments):    Removal Indications:    Wound WDL ex 11/10/20 1300   Drainage Amount None 11/10/20 1300   Appearance Eschar 11/10/20 1300   Black (%), Wound Tissue Color 100 % 11/10/20 1300   Wound Edges Defined 11/10/20 1300   Wound Length (cm) 0.5 cm 11/10/20 1300   Wound Width (cm) 0.5 cm 11/10/20 1300   Wound Depth (cm) 0.1 cm 11/10/20 1300   Wound Volume (cm^3) 0.02 cm^3 11/10/20 1300   Wound Surface Area (cm^2) 0.25 cm^2 11/10/20 1300   Care Cleansed with:;Soap and water 11/10/20 1300   Dressing Changed;Cast padding;Tubular bandage 11/10/20 1300   Off Loading Football dressing 11/10/20 1300            Wound 11/10/20 1030 Diabetic Ulcer Right lateral Foot (Active)   11/10/20 1030    Pre-existing: Yes   Primary Wound Type: Diabetic ulc   Side: Right   Orientation: lateral   Location: Foot   Wound Number (optional):    Ankle-Brachial Index:    Pulses:    Removal Indication and Assessment:    Wound Outcome:    (Retired) Wound Type:    (Retired) Wound Length (cm):    (Retired) Wound Width (cm):    (Retired) Depth (cm):    Wound Description (Comments):    Removal  Indications:    Wound Image   11/10/20 1030   Wound WDL ex 11/10/20 1030   Dressing Appearance Moist drainage 11/10/20 1030   Drainage Amount Moderate 11/10/20 1030   Drainage Characteristics/Odor Serosanguineous 11/10/20 1030   Appearance White 11/10/20 1030   Tissue loss description Partial thickness 11/10/20 1030   Periwound Area Pale white;Macerated 11/10/20 1030   Wound Edges Approximated 11/10/20 1030   Wound Length (cm) 6 cm 11/10/20 1030   Wound Width (cm) 8 cm 11/10/20 1030   Wound Depth (cm) 0.1 cm 11/10/20 1030   Wound Volume (cm^3) 4.8 cm^3 11/10/20 1030   Wound Surface Area (cm^2) 48 cm^2 11/10/20 1030   Care Cleansed with:;Antimicrobial agent;Soap and water 11/10/20 1030   Dressing Changed;Other (see comments);Cast padding;Tubular bandage 11/10/20 1030   Periwound Care Moisture barrier applied 11/10/20 1030   Off Loading Football dressing 11/10/20 1030            Wound 11/10/20 1030 Diabetic Ulcer Left medial Heel (Active)   11/10/20 1030    Pre-existing: No   Primary Wound Type: Diabetic ulc   Side: Left   Orientation: medial   Location: Heel   Wound Number (optional):    Ankle-Brachial Index:    Pulses:    Removal Indication and Assessment:    Wound Outcome:    (Retired) Wound Type:    (Retired) Wound Length (cm):    (Retired) Wound Width (cm):    (Retired) Depth (cm):    Wound Description (Comments):    Removal Indications:    Wound WDL ex 11/10/20 1030   Dressing Appearance No dressing 11/10/20 1030   Drainage Amount None 11/10/20 1030   Appearance Red 11/10/20 1030   Tissue loss description Partial thickness 11/10/20 1030   Red (%), Wound Tissue Color 100 % 11/10/20 1030   Periwound Area Intact 11/10/20 1030   Wound Edges Open 11/10/20 1030   Wound Length (cm) 0.2 cm 11/10/20 1030   Wound Width (cm) 0.4 cm 11/10/20 1030   Wound Depth (cm) 0.1 cm 11/10/20 1030   Wound Volume (cm^3) 0.01 cm^3 11/10/20 1030   Wound Surface Area (cm^2) 0.08 cm^2 11/10/20 1030   Care Cleansed with:;Soap and  water;Antimicrobial agent 11/10/20 1030   Dressing Applied;Foam;Cast padding 11/10/20 1030   Off Loading Football dressing 11/10/20 1030            Incision/Site 11/09/20 1240 Right Leg (Active)   11/09/20 1240   Present Prior to Hospital Arrival?:    Side: Right   Location: Leg   Orientation:    Incision Type:    Closure Method:    Additional Comments:    Removal Indication and Assessment:    Wound Outcome:    Removal Indications:    Wound Image   11/10/20 1030   Incision WDL ex 11/10/20 1030   Dressing Appearance Moist drainage 11/10/20 1030   Drainage Amount Moderate 11/10/20 1030   Drainage Characteristics/Odor Serosanguineous 11/10/20 1030   Appearance Red;Granulating 11/10/20 1030   Red (%), Wound Tissue Color 100 % 11/10/20 1030   Periwound Area Swelling 11/10/20 1030   Wound Edges Open 11/10/20 1030   Wound Length (cm) 7 cm 11/10/20 1030   Wound Width (cm) 5.5 cm 11/10/20 1030   Wound Depth (cm) 0.1 cm 11/10/20 1030   Wound Volume (cm^3) 3.85 cm^3 11/10/20 1030   Wound Surface Area (cm^2) 38.5 cm^2 11/10/20 1030   Care Cleansed with:;Soap and water;Antimicrobial agent 11/10/20 1030   Dressing Changed;Methylene blue/gentian violet;Other (see comments);Cast padding;Tubular bandage 11/10/20 1030   Periwound Care Moisture barrier applied 11/10/20 1030   Compression Tubular elasticized bandage 11/10/20 1030           Plan:     No orders of the defined types were placed in this encounter.          Follow up in about 3 days (around 11/13/2020) for wound care.

## 2020-11-12 LAB
BACTERIA SPEC AEROBE CULT: ABNORMAL

## 2020-11-13 ENCOUNTER — HOSPITAL ENCOUNTER (OUTPATIENT)
Facility: HOSPITAL | Age: 60
Discharge: HOME | End: 2020-11-13
Attending: RADIOLOGY | Admitting: RADIOLOGY
Payer: MEDICAID

## 2020-11-13 ENCOUNTER — TELEPHONE (OUTPATIENT)
Dept: INFECTIOUS DISEASES | Facility: CLINIC | Age: 60
End: 2020-11-13

## 2020-11-13 ENCOUNTER — HOSPITAL ENCOUNTER (OUTPATIENT)
Dept: INTERVENTIONAL RADIOLOGY/VASCULAR | Facility: HOSPITAL | Age: 60
Discharge: HOME | End: 2020-11-13
Attending: FAMILY MEDICINE
Payer: MEDICAID

## 2020-11-13 ENCOUNTER — TELEPHONE (OUTPATIENT)
Dept: FAMILY MEDICINE | Facility: CLINIC | Age: 60
End: 2020-11-13

## 2020-11-13 ENCOUNTER — HOSPITAL ENCOUNTER (OUTPATIENT)
Dept: WOUND CARE | Facility: HOSPITAL | Age: 60
Discharge: HOME | End: 2020-11-13
Attending: PODIATRIST
Payer: MEDICAID

## 2020-11-13 VITALS
SYSTOLIC BLOOD PRESSURE: 132 MMHG | RESPIRATION RATE: 18 BRPM | HEART RATE: 85 BPM | TEMPERATURE: 97 F | DIASTOLIC BLOOD PRESSURE: 79 MMHG

## 2020-11-13 VITALS
HEART RATE: 76 BPM | DIASTOLIC BLOOD PRESSURE: 63 MMHG | OXYGEN SATURATION: 96 % | TEMPERATURE: 98 F | SYSTOLIC BLOOD PRESSURE: 126 MMHG | RESPIRATION RATE: 20 BRPM

## 2020-11-13 DIAGNOSIS — E08.621 DIABETES MELLITUS DUE TO UNDERLYING CONDITION WITH FOOT ULCER, WITHOUT LONG-TERM CURRENT USE OF INSULIN: ICD-10-CM

## 2020-11-13 DIAGNOSIS — L97.919 DIABETIC ULCER OF RIGHT LOWER LEG: Primary | ICD-10-CM

## 2020-11-13 DIAGNOSIS — R18.8 OTHER ASCITES: ICD-10-CM

## 2020-11-13 DIAGNOSIS — L97.509 DIABETES MELLITUS DUE TO UNDERLYING CONDITION WITH FOOT ULCER, WITHOUT LONG-TERM CURRENT USE OF INSULIN: ICD-10-CM

## 2020-11-13 DIAGNOSIS — I70.233 ATHEROSCLEROSIS OF NATIVE ARTERY OF RIGHT LOWER EXTREMITY WITH ULCERATION OF ANKLE: ICD-10-CM

## 2020-11-13 DIAGNOSIS — E11.621 DIABETIC ULCER OF TOE OF RIGHT FOOT ASSOCIATED WITH TYPE 2 DIABETES MELLITUS, WITH BONE INVOLVEMENT WITHOUT EVIDENCE OF NECROSIS: ICD-10-CM

## 2020-11-13 DIAGNOSIS — I83.899 VENOUS STASIS ULCER WITH EDEMA OF LOWER LEG: ICD-10-CM

## 2020-11-13 DIAGNOSIS — I83.009 VENOUS STASIS ULCER WITH EDEMA OF LOWER LEG: ICD-10-CM

## 2020-11-13 DIAGNOSIS — E11.622 DIABETIC ULCER OF RIGHT LOWER LEG: Primary | ICD-10-CM

## 2020-11-13 DIAGNOSIS — R60.9 VENOUS STASIS ULCER WITH EDEMA OF LOWER LEG: ICD-10-CM

## 2020-11-13 DIAGNOSIS — L97.909 VENOUS STASIS ULCER WITH EDEMA OF LOWER LEG: ICD-10-CM

## 2020-11-13 DIAGNOSIS — L97.516 DIABETIC ULCER OF TOE OF RIGHT FOOT ASSOCIATED WITH TYPE 2 DIABETES MELLITUS, WITH BONE INVOLVEMENT WITHOUT EVIDENCE OF NECROSIS: ICD-10-CM

## 2020-11-13 PROCEDURE — 49083 ABD PARACENTESIS W/IMAGING: CPT

## 2020-11-13 PROCEDURE — 99204 OFFICE O/P NEW MOD 45 MIN: CPT | Mod: ,,, | Performed by: PODIATRIST

## 2020-11-13 PROCEDURE — 63600175 PHARM REV CODE 636 W HCPCS: Mod: JG | Performed by: RADIOLOGY

## 2020-11-13 PROCEDURE — 49083 IR PARACENTESIS WITH IMAGING: ICD-10-PCS | Mod: ,,, | Performed by: RADIOLOGY

## 2020-11-13 PROCEDURE — 49083 ABD PARACENTESIS W/IMAGING: CPT | Mod: ,,, | Performed by: RADIOLOGY

## 2020-11-13 PROCEDURE — 99204 PR OFFICE/OUTPT VISIT, NEW, LEVL IV, 45-59 MIN: ICD-10-PCS | Mod: ,,, | Performed by: PODIATRIST

## 2020-11-13 PROCEDURE — 99214 OFFICE O/P EST MOD 30 MIN: CPT | Performed by: PODIATRIST

## 2020-11-13 PROCEDURE — A7048 VACUUM DRAIN BOTTLE/TUBE KIT: HCPCS

## 2020-11-13 PROCEDURE — P9047 ALBUMIN (HUMAN), 25%, 50ML: HCPCS | Mod: JG | Performed by: RADIOLOGY

## 2020-11-13 RX ORDER — ALBUMIN HUMAN 250 G/1000ML
37.5 SOLUTION INTRAVENOUS ONCE AS NEEDED
Status: COMPLETED | OUTPATIENT
Start: 2020-11-13 | End: 2020-11-13

## 2020-11-13 RX ORDER — ALBUMIN HUMAN 250 G/1000ML
37.5 SOLUTION INTRAVENOUS ONCE AS NEEDED
Status: CANCELLED | OUTPATIENT
Start: 2020-11-13 | End: 2032-04-11

## 2020-11-13 RX ADMIN — ALBUMIN (HUMAN) 37.5 G: 0.25 INJECTION, SOLUTION INTRAVENOUS at 01:11

## 2020-11-13 NOTE — H&P
Radiology History & Physical      SUBJECTIVE:     Chief Complaint: Recurrent painful, tense ascites     History of Present Illness:  Marvin Ray is a 60 y.o. male with PMHx of CKD III and combined systolic/diastolic CHF complicated by recurrent abdominal distension and pain 2/2 recurrent painful, tense ascites without TTP on PE to suggest SBP requiring frequent  therapeutic LVP.      A new outpatient IR consult placed for US-guided therapeutic large-volume paracentesis.    Past Medical History:   Diagnosis Date    Arthritis     Cellulitis     CKD (chronic kidney disease), stage III     Coronary artery disease     Diabetes mellitus     Diabetic retinopathy     Diabetic ulcer of left foot     Glaucoma     Gout     Hyperlipemia     Hypertension     ICD (implantable cardioverter-defibrillator) in place 11/02/2018    Left chest    Non-pressure chronic ulcer of other part of left foot with fat layer exposed 10/23/2018    PVD (peripheral vascular disease)     Type 2 diabetes mellitus with left diabetic foot ulcer 10/29/2018    Unsteady gait     uses a wheelchair     Past Surgical History:   Procedure Laterality Date    AHMED GLAUCOMA IMPLANT Left 2011    DONE AT [facility]    AORTOGRAPHY WITH SERIALOGRAPHY Left 4/30/2019    Procedure: AORTOGRAM, WITH SERIALOGRAPHY, LEFT LOWER EXTREMITY, POSSIBLE INTERVENTION;  Surgeon: Mitch Chan MD;  Location: [facility] OR;  Service: Vascular;  Laterality: Left;  RN PRE OP 4-23-19.  NO H & P, No [place]  [place]    AORTOGRAPHY WITH SERIALOGRAPHY Right 10/6/2020    Procedure: AORTOGRAM, WITH SERIALOGRAPHY, RIGHT LOWER EXTREMITY ANGIOGRAM, POSSIBLE INTERVENTION;  Surgeon: Mitch Chan MD;  Location: [facility] OR;  Service: Vascular;  Laterality: Right;  RN PREOP 10/1/2020--COVID NEGATIVE ON 10/5    BAERVELDT GLAUCOMA IMPLANT Left 2012    WITH CATARACT EXTRACTION//DONE AT [facility]    CARDIAC CATHETERIZATION Left 05/2016    CARDIAC DEFIBRILLATOR PLACEMENT Left  11/02/2018    CATARACT EXTRACTION W/  INTRAOCULAR LENS IMPLANT Left 2012    WITH BAERVEDT//DONE AT [facility]    CATARACT EXTRACTION W/  INTRAOCULAR LENS IMPLANT Right 09/26/2018    COMPLEX ()    CB DESTRUCTION WITH CYCLO G6 Left 02/15/2017        CYST REMOVAL      DEBRIDEMENT OF FOOT  12/28/2018    Procedure: DEBRIDEMENT, FOOT;  Surgeon: Mitch Wall MD;  Location: [facility] OR;  Service: Vascular;;    DEBRIDEMENT OF FOOT  6/5/2019    Procedure: DEBRIDEMENT, FOOT;  Surgeon: Mitch Wall MD;  Location: [facility] OR;  Service: Vascular;;    EXAMINATION UNDER ANESTHESIA Left 12/5/2018    Procedure: Exam under anesthesia, left foot debridement, washout and all other indicated procedures;  Surgeon: Mitch Wall MD;  Location: [facility] OR;  Service: Vascular;  Laterality: Left;  1030AM START  RN PREOP 12/3/2018-----AICD  SEEN BY DR JAMES 12/4    EXAMINATION UNDER ANESTHESIA Left 2/13/2019    Procedure: LEFT FOOT WOUND DEBRIDEMENT, WASHOUT AND ALL OTHER INDICATED PROCEDURES;  Surgeon: Mitch Wall MD;  Location: [facility] OR;  Service: Vascular;  Laterality: Left;  requesting 1st case start  THIS CASE 1ST PER DR WALL ON 2/1/19 @ 844AM -LO  RN PREOP 2/6/2019  HAS CARDIAC CLEARANCE    EXAMINATION UNDER ANESTHESIA Left 12/28/2018    Procedure: Exam under anesthesia, left foot debridement, left foot washout, possible left second through fifth metatarsal resection;  Surgeon: Mitch Wall MD;  Location: [facility] OR;  Service: Vascular;  Laterality: Left;  1:00pm start per julissa @ 8:58am  RN  PHONE PRE OP 12-27-18--=Pt coming from [facility] on [address]  NEED CONSENT    EXAMINATION UNDER ANESTHESIA Left 6/5/2019    Procedure: LEFT FOOT DEBRIDEMENT, WASHOUT AND ALL OTHER INDICATED PROCEDURES;  Surgeon: Mitch Wall MD;  Location: [facility] OR;  Service: Vascular;  Laterality: Left;  RN PRE OP 5-31-19------ICD------NEED CONSENT    EXAMINATION UNDER ANESTHESIA Right 11/9/2020     Procedure: RIGHT FOOT DEBRIDEMENT, WASHOUT AND ALL OTHER INDICATED PROCEDURES;  Surgeon: Mitch Chan MD;  Location: [facility] OR;  Service: Vascular;  Laterality: Right;  RN PREOP 10/27/2020, --COVID NEGATIVE ON 11/6, has cardiac clearance/[place] 10/27/2020, pt has ICD  NEEDS ORDERS    INCISION AND DRAINAGE Left 11/14/2018    Procedure: Incision and Drainage, left lower extremity debridement, washout;  Surgeon: Mitch Chan MD;  Location: [facility] OR;  Service: Vascular;  Laterality: Left;    INCISION AND DRAINAGE Left 11/16/2018    Procedure: INCISION AND DRAINAGE;  Surgeon: Mitch Chan MD;  Location: [facility] OR;  Service: Vascular;  Laterality: Left;    INTRAOCULAR PROSTHESES INSERTION Right 9/26/2018    Procedure: INSERTION, IOL PROSTHESIS;  Surgeon: Perla Cortés MD;  Location: [facility] OR [address] [place] [address];  Service: Ophthalmology;  Laterality: Right;    PERITONEOCENTESIS N/A 3/1/2019    Procedure: PARACENTESIS, ABDOMINAL, INITIAL;  Surgeon: Dosc Diagnostic Provider;  Location: [facility] OR;  Service: Radiology;  Laterality: N/A;    PERITONEOCENTESIS N/A 3/25/2019    Procedure: PARACENTESIS, ABDOMINAL, INITIAL;  Surgeon: Dosc Diagnostic Provider;  Location: [facility] OR;  Service: Radiology;  Laterality: N/A;    PERITONEOCENTESIS N/A 7/22/2019    Procedure: PARACENTESIS, ABDOMINAL, INITIAL;  Surgeon: Dosc Diagnostic Provider;  Location: [facility] OR;  Service: Radiology;  Laterality: N/A;    PERITONEOCENTESIS N/A 8/16/2019    Procedure: PARACENTESIS, ABDOMINAL, INITIAL;  Surgeon: Dosc Diagnostic Provider;  Location: [facility] OR;  Service: Radiology;  Laterality: N/A;    PERITONEOCENTESIS N/A 9/26/2019    Procedure: PARACENTESIS, ABDOMINAL;  Surgeon: Dosc Diagnostic Provider;  Location: [facility] OR;  Service: Radiology;  Laterality: N/A;    PERITONEOCENTESIS N/A 10/11/2019    Procedure: PARACENTESIS, ABDOMINAL;  Surgeon: Dosc Diagnostic Provider;  Location: [facility] OR;  Service: Radiology;  Laterality: N/A;     PERITONEOCENTESIS N/A 10/31/2019    Procedure: PARACENTESIS, ABDOMINAL;  Surgeon: Dosc Diagnostic Provider;  Location: [facility] OR;  Service: Radiology;  Laterality: N/A;    PERITONEOCENTESIS N/A 11/18/2019    Procedure: PARACENTESIS, ABDOMINAL;  Surgeon: Dosc Diagnostic Provider;  Location: [facility] OR;  Service: Radiology;  Laterality: N/A;    PERITONEOCENTESIS N/A 12/16/2019    Procedure: PARACENTESIS, ABDOMINAL;  Surgeon: Dosc Diagnostic Provider;  Location: [facility] OR;  Service: Radiology;  Laterality: N/A;  2PM START    PERITONEOCENTESIS N/A 2/7/2020    Procedure: PARACENTESIS, ABDOMINAL;  Surgeon: Dosc Diagnostic Provider;  Location: [facility] OR;  Service: Radiology;  Laterality: N/A;  REQUESTED 10:30A START    PERITONEOCENTESIS N/A 2/19/2020    Procedure: PARACENTESIS, ABDOMINAL;  Surgeon: Dosc Diagnostic Provider;  Location: [facility] OR;  Service: Radiology;  Laterality: N/A;    PERITONEOCENTESIS N/A 2/28/2020    Procedure: PARACENTESIS, ABDOMINAL;  Surgeon: Dosc Diagnostic Provider;  Location: [facility] OR;  Service: Radiology;  Laterality: N/A;  REQUESTED 10AM START    PHACOEMULSIFICATION OF CATARACT Right 9/26/2018    Procedure: PHACOEMULSIFICATION, CATARACT;  Surgeon: Perla Cortés MD;  Location: [address];  Service: Ophthalmology;  Laterality: Right;    REPEAT ABDOMINAL PARACENTESIS N/A 2/11/2019    Procedure: PARACENTESIS, ABDOMINAL, REPEAT;  Surgeon: Dosc Diagnostic Provider;  Location: [facility] OR;  Service: Radiology;  Laterality: N/A;  1PM START    REPEAT ABDOMINAL PARACENTESIS N/A 9/12/2019    Procedure: PARACENTESIS, ABDOMINAL, REPEAT;  Surgeon: Dosc Diagnostic Provider;  Location: [facility] OR;  Service: Radiology;  Laterality: N/A;  9AM START    REPEAT ABDOMINAL PARACENTESIS N/A 12/2/2019    Procedure: PARACENTESIS, ABDOMINAL, REPEAT;  Surgeon: Dosc Diagnostic Provider;  Location: [facility] OR;  Service: Radiology;  Laterality: N/A;  3PM START    REPEAT ABDOMINAL PARACENTESIS N/A 1/10/2020    Procedure:  PARACENTESIS, ABDOMINAL, REPEAT;  Surgeon: Dosc Diagnostic Provider;  Location: [facility] OR;  Service: Radiology;  Laterality: N/A;  1130AM START    REPEAT ABDOMINAL PARACENTESIS N/A 1/20/2020    Procedure: PARACENTESIS, ABDOMINAL, REPEAT;  Surgeon: Dosc Diagnostic Provider;  [facility] OR;  Service: Radiology;  Laterality: N/A;  1PM START    REPEAT ABDOMINAL PARACENTESIS N/A 1/31/2020    Procedure: PARACENTESIS, ABDOMINAL, REPEAT;  Surgeon: Dosc Diagnostic Provider;  Location: [facility] OR;  Service: Radiology;  Laterality: N/A;  10AM START    REPEAT ABDOMINAL PARACENTESIS N/A 3/16/2020    Procedure: PARACENTESIS, ABDOMINAL, REPEAT;  Surgeon: Dosc Diagnostic Provider;  Location: [facility] OR;  Service: Radiology;  Laterality: N/A;  10AM START    REPEAT ABDOMINAL PARACENTESIS N/A 3/30/2020    Procedure: PARACENTESIS, ABDOMINAL, REPEAT;  Surgeon: Dosc Diagnostic Provider;  Location: [facility] OR;  Service: Radiology;  Laterality: N/A;    REPEAT ABDOMINAL PARACENTESIS N/A 4/9/2020    Procedure: PARACENTESIS, ABDOMINAL, REPEAT;  Surgeon: Dos Diagnostic Provider;  Location: [facility] OR;  Service: Radiology;  Laterality: N/A;  1130AM START    REPEAT ABDOMINAL PARACENTESIS N/A 5/8/2020    Procedure: PARACENTESIS, ABDOMINAL, REPEAT;  Surgeon: Dos Diagnostic Provider;  Location: [facility] OR;  Service: Radiology;  Laterality: N/A;  9AM START    REPEAT ABDOMINAL PARACENTESIS N/A 5/21/2020    Procedure: PARACENTESIS, ABDOMINAL, REPEAT;  Surgeon: Dosc Diagnostic Provider;  Location: [facility] OR;  Service: Radiology;  Laterality: N/A;  10AM START    REPEAT ABDOMINAL PARACENTESIS N/A 6/5/2020    Procedure: PARACENTESIS, ABDOMINAL, REPEAT;  Surgeon: Dosc Diagnostic Provider;  Location: [facility] OR;  Service: Radiology;  Laterality: N/A;  NOON START    REPEAT ABDOMINAL PARACENTESIS N/A 7/10/2020    Procedure: PARACENTESIS, ABDOMINAL, REPEAT;  Surgeon: Dosc Diagnostic Provider;  Location: [facility];  Service: Radiology;  Laterality: N/A;  1PM START     REPEAT ABDOMINAL PARACENTESIS N/A 8/20/2020    Procedure: PARACENTESIS, ABDOMINAL, REPEAT;  Surgeon: [facility] Diagnostic Provider;  Location: [facility] OR;  Service: Radiology;  Laterality: N/A;  1PM START    REPEAT ABDOMINAL PARACENTESIS N/A 9/4/2020    Procedure: PARACENTESIS, ABDOMINAL, REPEAT;  Surgeon: [facility] Diagnostic Provider;  Location: [facility] OR;  Service: Radiology;  Laterality: N/A;  11 AM START    REPEAT ABDOMINAL PARACENTESIS N/A 9/16/2020    Procedure: PARACENTESIS, ABDOMINAL, REPEAT;  Surgeon: [facility] Diagnostic Provider;  Location: [facility] OR;  Service: Radiology;  Laterality: N/A;  START 2:00 P.M.    REPEAT ABDOMINAL PARACENTESIS N/A 9/28/2020    Procedure: PARACENTESIS, ABDOMINAL, REPEAT;  Surgeon: [facility] Diagnostic Provider;  Location: [facility] OR;  Service: Radiology;  Laterality: N/A;  1 P.M. START    REPEAT ABDOMINAL PARACENTESIS N/A 11/3/2020    Procedure: PARACENTESIS, ABDOMINAL, REPEAT;  Surgeon: [facility] Diagnostic Provider;  Location: [facility] OR;  Service: Radiology;  Laterality: N/A;  11AM START    REVISION OF PROCEDURE INVOLVING GLAUCOMA DRAINAGE DEVICE Left 10/2/2019    EXTRUDING BAERVELDT /WITH PERICARDIAL PATCH GRAFT ()    Right foot surgery  10/2014    TOE AMPUTATION Right     first and second    TOE AMPUTATION Left 11/16/2018    Procedure: AMPUTATION, TOES  2-5;  Surgeon: Mitch Chan MD;  Location: [facility] OR;  Service: Vascular;  Laterality: Left;    TOE AMPUTATION Left 12/5/2018    Procedure: AMPUTATION, TOE;  Surgeon: Mitch Chan MD;  Location: [facility] OR;  Service: Vascular;  Laterality: Left;  left great toe    TONSILLECTOMY       Home Meds:   Prior to Admission medications    Medication Sig Start Date End Date Taking? Authorizing Provider   allopurinoL (ZYLOPRIM) 300 MG tablet Take 1 tablet (300 mg total) by mouth once daily. 10/6/20   Rubén Davis MD   aspirin (ECOTRIN) 81 MG EC tablet Take 81 mg by mouth once daily.    Historical Provider   bisacodyl (DULCOLAX) 5 mg  "EC tablet Take 5 mg by mouth daily as needed for Constipation.    Historical Provider   brimonidine 0.2% (ALPHAGAN) 0.2 % Drop Place 1 drop into both eyes 3 (three) times daily. 12/12/19   Perla Cortés MD   carvediloL (COREG) 3.125 MG tablet TAKE 1 TABLET(3.125 MG) BY MOUTH TWICE DAILY 8/17/20   Rubén Davis MD   coenzyme Q10 100 mg capsule Take 100 mg by mouth every morning.    Historical Provider   collagenase (SANTYL) ointment Apply topically once daily. 10/20/20   Rubén Davis MD   dorzolamide-timolol 2-0.5% (COSOPT) 22.3-6.8 mg/mL ophthalmic solution Place 1 drop into both eyes 3 (three) times daily. 12/12/19   Pelra Cortés MD   ezetimibe (ZETIA) 10 mg tablet TAKE 1 TABLET(10 MG) BY MOUTH EVERY DAY 7/16/20   Rubén Davis MD   fish oil-omega-3 fatty acids 300-1,000 mg capsule Take 1 capsule by mouth 2 (two) times daily. 1/23/19   Sara Ching MD   gabapentin (NEURONTIN) 100 MG capsule TAKE 2 CAPSULES(200 MG) BY MOUTH EVERY EVENING 9/21/20   Rubén Davis MD   HYDROcodone-acetaminophen (NORCO) 7.5-325 mg per tablet Take 1 tablet by mouth every 8 (eight) hours as needed for Pain. 10/20/20   Rubén Davis MD   insulin (LANTUS SOLOSTAR U-100 INSULIN) glargine 100 units/mL (3mL) SubQ pen Inject 35 units once daily 9/11/20   Sheryl Madison NP   insulin degludec-liraglutide (XULTOPHY 100/3.6) 100 unit-3.6 mg /mL (3 mL) InPn Inject 35 Units into the skin once daily.    Historical Provider   MULTIVIT,THER IRON,CA,FA & MIN (MULTIVITAMIN) Tab Take 1 tablet by mouth every morning.     Historical Provider   oxyCODONE-acetaminophen (PERCOCET) 5-325 mg per tablet Take 1 tablet by mouth every 8 (eight) hours as needed. 11/9/20 11/23/20  Mitch Chan MD   pen needle, diabetic (BD ULTRA-FINE AMADOR PEN NEEDLE) 32 gauge x 5/32" Ndle 1 Device by Misc.(Non-Drug; Combo Route) route 2 (two) times a day. 9/11/20   Sheryl Madison NP   torsemide (DEMADEX) 20 MG Tab Take 4 tablets (80 mg total) by " mouth 2 (two) times daily. 11/5/20 12/5/20  Rubén Davis MD     Anticoagulants/Antiplatelets: no anticoagulation    Allergies:   Review of patient's allergies indicates:   Allergen Reactions    Statins-hmg-coa reductase inhibitors      Generalized Pain    Onglyza [saxagliptin]     Penicillins Rash     Sedation History:  no adverse reactions     Review of Systems:   Hematological: no known coagulopathies  Respiratory: positive for - orthopnea and shortness of breath  Cardiovascular: positive for - dyspnea on exertion, orthopnea and shortness of breath  Gastrointestinal: positive for - abdominal pain and distension  Genito-Urinary: no dysuria, trouble voiding, or hematuria  Musculoskeletal: negative  Neurological: no TIA or stroke symptoms      OBJECTIVE:     Vital Signs (Most Recent)     Physical Exam:  General: no acute distress  Mental Status: alert and oriented to person, place and time  HEENT: normocephalic, atraumatic  Chest: mild labored breathing  Heart: regular heart rate  Abdomen: +distended. No TTP/r/g.  Extremity: moves all extremities    Laboratory  Lab Results   Component Value Date    INR 1.1 05/31/2019       Lab Results   Component Value Date    WBC 6.66 10/27/2020    HGB 11.5 (L) 10/27/2020    HCT 36.0 (L) 10/27/2020    MCV 90 10/27/2020     10/27/2020      Lab Results   Component Value Date    GLU 87 10/27/2020     10/27/2020    K 4.6 10/27/2020     10/27/2020    CO2 29 10/27/2020    BUN 59 (H) 10/27/2020    CREATININE 1.5 (H) 10/27/2020    CALCIUM 8.9 10/27/2020    MG 2.1 05/31/2019    ALT 12 10/27/2020    AST 15 10/27/2020    ALBUMIN 2.7 (L) 10/27/2020    BILITOT 0.6 10/27/2020     ASSESSMENT/PLAN:     61 y/o M with CKD III and combined systolic/diastolic CHF complicated by recurrent abdominal distension and pain 2/2 recurrent painful, tense ascites without TTP on PE to suggest SBP, requiring frequent large-volume therapeutic paracentesis.      1. Recurrent painful, tense  ascites - Will attempt U/S-guided therapeutic paracentesis with local anesthetic only and albumin infusion post PRN per institutional protocol.      Risks (including, but not limited to, pain, bleeding, infection, damage to nearby structures, failure to obtain sufficient material for a diagnosis, the need for additional procedures, and death), benefits, and alternatives were discussed with the patient. All questions were answered to the best of my abilities. The patient wishes to proceed with the procedure. Written informed consent was obtained.     Thank you for considering IR for the care of your patient.      Zach Cha MD  Interventional Radiology

## 2020-11-13 NOTE — DISCHARGE INSTRUCTIONS
BATHING:  ? You may shower tomorrow.  DRESSING:  ? Remove dressing tomorrow.        ACTIVITY LEVEL: If you have received sedation or an anesthetic, you may feel sleepy for several hours. Rest until you are more awake. Gradually resume your normal activities    Do not drive, drink alcohol, or sign legal documents for 24 hours, or if taking narcotic pain medication.      DIET: You may resume your home diet. If nausea is present, increase your diet gradually with fluids and bland foods.    Medications: Pain medication should be taken only if needed and as directed. If antibiotics are prescribed, the medication should be taken until completed. You will be given an updated list of you medications.  ? No driving, alcoholic beverages or signing legal documents for next 24 hours if you have had sedation, or while taking pain medication    CALL THE DOCTOR:   For any obvious bleeding (some dried blood over the incision is normal).     Redness, swelling, foul smell around incision or fever over 101.  Shortness of breath.  Persistent pain or nausea not relieved by medication.  Call  [phone]     to speak with an Interventional Radiologist    If any unusual problems or difficulties occur contact your doctor. If you cannot contact your doctor but feel your signs and symptoms warrant a physicians attention return to the emergency room.        Discharge Instructions for Paracentesis  Paracentesis is a procedure to remove extra fluid from your belly (abdomen). This fluid buildup in the abdomen is called ascites. The procedure may have been done to take a sample of the fluid. Or, it may have been done to drain the extra fluid from your abdomen and help make you more comfortable.     Ascites is buildup of excess fluid in the abdomen.   Home care  · If you have pain after the procedure, your healthcare provider can prescribe or recommend pain medicines. Take these exactly as directed. If you stopped taking other medicines before the  procedure, ask your provider when you can start them again.  · Take it easy for 24 hours after the procedure. Avoid physical activity until your provider says its OK.  · You will have a small bandage over the puncture site. Stitches (sutures), surgical staples, adhesive tapes, adhesive strips, or surgical glue may be used to close the incision. They also help stop bleeding and speed healing. You may take the bandage off in 24 hours.  · Check the puncture site for the signs of infection listed below.  Follow-up care  Make a follow-up appointment with your healthcare provider as directed. During your follow-up visit, your provider will check your healing. Let your provider know how you are feeling. You can also discuss the cause of your ascites and if you need any further treatment.  When to seek medical advice  Call your healthcare provider if you have any of the following after the procedure:  · A fever of 100.4°F (38.0°C) or higher  · Trouble breathing  · Pain that doesn't go away even after taking pain medicine  · Belly pain not caused by having the skin punctured  · Bleeding from the puncture site  · More than a small amount of fluid leaking from the puncture site  · Swollen belly  · Signs of infection at the puncture site. These include increased pain, redness, or swelling, warmth, or bad-smelling drainage.  · Blood in your urine  · Feeling dizzy or lightheaded, or fainting   Date Last Reviewed: 7/1/2016  © 4261-9228 [employer]. [address] [place] [address]. All rights reserved. This information is not intended as a substitute for professional medical care. Always follow your healthcare professional's instructions.    Fall Prevention  Millions of people fall every year and injure themselves. You may have had anesthesia or sedation which may increase your risk of falling. You may have health issues that put you at an increased risk of falling.     Here are ways to reduce your risk of  falling.  ·   · Make your home safe by keeping walkways clear of objects you may trip over.  · Use non-slip pads under rugs. Do not use area rugs or small throw rugs.  · Use non-slip mats in bathtubs and showers.  · Install handrails and lights on staircases.  · Do not walk in poorly lit areas.  · Do not stand on chairs or wobbly ladders.  · Use caution when reaching overhead or looking upward. This position can cause a loss of balance.  · Be sure your shoes fit properly, have non-slip bottoms and are in good condition.   · Wear shoes both inside and out. Avoid going barefoot or wearing slippers.  · Be cautious when going up and down stairs, curbs, and when walking on uneven sidewalks.  · If your balance is poor, consider using a cane or walker.  · If your fall was related to alcohol use, stop or limit alcohol intake.   · If your fall was related to use of sleeping medicines, talk to your doctor about this. You may need to reduce your dosage at bedtime if you awaken during the night to go to the bathroom.    · To reduce the need for nighttime bathroom trips:  ¨ Avoid drinking fluids for several hours before going to bed  ¨ Empty your bladder before going to bed  ¨ Men can keep a urinal at the bedside  · Stay as active as you can. Balance, flexibility, strength, and endurance all come from exercise. They all play a role in preventing falls. Ask your healthcare provider which types of activity are right for you.  · Get your vision checked on a regular basis.  · If you have pets, know where they are before you stand up or walk so you don't trip over them.  · Use night lights.

## 2020-11-13 NOTE — BRIEF OP NOTE
Radiology Post-Procedure Note     Pre Op Diagnosis: Recurrent painful, tense ascites  Post Op Diagnosis: Same     Procedure: U/S-guided therapeutic LVP     Procedure performed by: Zach Cha MD     Written Informed Consent Obtained: Yes  Specimen Removed: YES, 6400-cc of thin, straw-colored ascitic fluid  Estimated Blood Loss: none     Findings:   1. Successful therapeutic large-volume paracentesis with local anesthetic only and albumin infusion post PRN as indicated per institutional protocol. Patient tolerated the procedure well. No immediate post-procedural complications noted.       Thank you for considering IR for the care of your patient.      Zach Cha MD  Interventional Radiology

## 2020-11-14 NOTE — TELEPHONE ENCOUNTER
Left voicemail on patient's cell phone.  Called his sister and spoke to her extensively.  Reviewed the operative note and cultures.  Per op note, right heal wound infection was limited to the fascia and did not go down to bone.  Some dead tissue was debrided.  Cultures were positive for Pseudomonas aeruginosa, Enterococcus faecalis, Klebsiella pneumoniae.    Assessment  1. Polymicrobial wound infection.    Plan  1. Meropoenem 1 gram IV q 8 hours for 2 weeks.  2. Contact [facility] and see if his insurance will cover this.  If so, then see if they will place a midline.  3. Cbc, cmp once a week while on antibiotics.

## 2020-11-15 LAB — BACTERIA SPEC ANAEROBE CULT: NORMAL

## 2020-11-16 ENCOUNTER — OFFICE VISIT (OUTPATIENT)
Dept: VASCULAR SURGERY | Facility: CLINIC | Age: 60
End: 2020-11-16
Payer: MEDICAID

## 2020-11-16 VITALS — SYSTOLIC BLOOD PRESSURE: 110 MMHG | DIASTOLIC BLOOD PRESSURE: 58 MMHG | BODY MASS INDEX: 27.12 KG/M2 | HEIGHT: 70 IN

## 2020-11-16 DIAGNOSIS — S91.301D OPEN WOUND OF RIGHT HEEL, SUBSEQUENT ENCOUNTER: ICD-10-CM

## 2020-11-16 DIAGNOSIS — I70.239 ATHEROSCLEROSIS OF NATIVE ARTERY OF RIGHT LEG WITH ULCERATION: ICD-10-CM

## 2020-11-16 DIAGNOSIS — I70.233 ATHEROSCLEROSIS OF NATIVE ARTERY OF RIGHT LOWER EXTREMITY WITH ULCERATION OF ANKLE: Primary | ICD-10-CM

## 2020-11-16 PROCEDURE — 97597 DBRDMT OPN WND 1ST 20 CM/<: CPT | Mod: S$PBB,,, | Performed by: SURGERY

## 2020-11-16 PROCEDURE — 97597 DBRDMT OPN WND 1ST 20 CM/<: CPT | Mod: PBBFAC | Performed by: SURGERY

## 2020-11-16 PROCEDURE — 99999 PR PBB SHADOW E&M-EST. PATIENT-LVL V: ICD-10-PCS | Mod: PBBFAC,,, | Performed by: SURGERY

## 2020-11-16 PROCEDURE — 99999 PR PBB SHADOW E&M-EST. PATIENT-LVL V: CPT | Mod: PBBFAC,,, | Performed by: SURGERY

## 2020-11-16 PROCEDURE — 99215 OFFICE O/P EST HI 40 MIN: CPT | Mod: PBBFAC | Performed by: SURGERY

## 2020-11-16 PROCEDURE — 97597 PR DEBRIDEMENT OPEN WOUND 20 SQ CM<: ICD-10-PCS | Mod: S$PBB,,, | Performed by: SURGERY

## 2020-11-16 NOTE — PROGRESS NOTES
Mitch Chan MD [facility]                         11/16/2020    HPI:  Marvin Ray is a 60 y.o. male with   Patient Active Problem List   Diagnosis    Epiretinal membrane, both eyes    Nuclear sclerotic cataract of right eye    Pseudophakia, left eye    Ptosis, left eyelid    DM type 2 with diabetic peripheral neuropathy    Mixed hyperlipidemia    Neovascular glaucoma of both eyes    Tophaceous gout    Essential hypertension    Coronary artery disease involving native coronary artery of native heart without angina pectoris    Neovascular glaucoma of left eye, severe stage    Statin myopathy    Neovascular glaucoma, right eye, mild stage    PVD (peripheral vascular disease)    Right wrist pain    Hepatosplenomegaly    Type 2 diabetes mellitus with left diabetic foot ulcer    Other ascites    MDR Acinetobacter baumannii infection    Debility    Subacute osteomyelitis of left foot    Stage 3 chronic kidney disease    Atherosclerosis of left lower extremity with ulceration of midfoot    CKD stage 3 due to type 2 diabetes mellitus    Anemia due to multiple mechanisms    Persistent proteinuria    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    Ischemic cardiomyopathy    Status post abdominal paracentesis    Diabetic ulcer of right lower leg    Diabetic ulcer of toe of right foot associated with type 2 diabetes mellitus, with bone involvement without evidence of necrosis    Diabetes mellitus due to underlying condition with foot ulcer, without long-term current use of insulin    Type 2 diabetes mellitus with right diabetic foot ulcer    Statin intolerance    Abnormal EKG    Atherosclerosis of native artery of right lower extremity with ulceration of ankle    Venous stasis ulcer with edema of lower leg    being managed by PCP and specialists who is here today for evaluation of L foot wound.  Seen in hospital last mo  with LLE cellulitis.  Treated with Abx.  Also had paracentesis for ascites with negative w/u per family.  Patient states location is L foot occurring for 1-2 mo, worsening foot wound although improvement in edema and erythema.  Associated signs and symptoms include pain and edema.  Quality is aching and severity is 5/10.  Symptoms began 10/2018 spontaneously with blistering and severe edema.  Alleviating factors include wound care and elevation.  Worsening factors include pressure.    Tobacco use: denies    1/4/19: s/p LLE angioplasty and multiple subsequent OR and bedside debridements.  On IV Abx per culture results.  Glucose better controlled.  No fevers.  Receiving local wound care with Santyl.    1/14/19:  Cont to receive local wound care.  Pseudomonas in tissue and bone cultures multidrug resistant other than aminoglycoside; d/w Dr. Velez with high risk of ESRD with 6 weeks of aminoglycoside treatment.  No F/C.    1/31/19:  Pt without complaints.  Was started back on Bactrim.  Family doing wound care.    2/28/19: S/p L foot debridement 2/13/19.  Started on Meropenem.    3/28/19:  No complaints.  S/p paracentesis and pt feels better.    5/16/19:  S/p L peroneal and AT angioplasty, wound healing well.    9/30/19:  S/p L foot debridement 6/5/19. WV placed.    11/2019:  States wound is healing well.  Did not see Plastic Surgery clinic.  Cont wound care center and home health wound care.    2/2020: States wound nearly fully healed.  Receiving wound care daily.    5/2020:  Wounds healing.  Seeing Dr. Davis next week.    7/2020:  Doing well with L foot wound, has developed a R medial maleolus wound due to trauma    8/2020:  Wounds healing BLE.    9/2020: RLE wounds not healing well.  No F/C.    10/2020:  S/p R SFA angioplasty with a 5x60 mm Ultraverse balloon 10/6/20.  States wounds look better, receiving daily wound care.    11/2020:  S/p RLE wounds debridement 11/6/20.  Doing well, +cultures on Abx and receiving  wound care by Dr. Mcgee also at [facility]    Past Medical History:   Diagnosis Date    Arthritis     Cellulitis     CKD (chronic kidney disease), stage III     Coronary artery disease     Diabetes mellitus     Diabetic retinopathy     Diabetic ulcer of left foot     Glaucoma     Gout     Hyperlipemia     Hypertension     ICD (implantable cardioverter-defibrillator) in place 11/02/2018    Left chest    Non-pressure chronic ulcer of other part of left foot with fat layer exposed 10/23/2018    PVD (peripheral vascular disease)     Type 2 diabetes mellitus with left diabetic foot ulcer 10/29/2018    Unsteady gait     uses a wheelchair     Past Surgical History:   Procedure Laterality Date    AHMED GLAUCOMA IMPLANT Left 2011    DONE AT [facility]    AORTOGRAPHY WITH SERIALOGRAPHY Left 4/30/2019    Procedure: AORTOGRAM, WITH SERIALOGRAPHY, LEFT LOWER EXTREMITY, POSSIBLE INTERVENTION;  Surgeon: Mitch Chan MD;  Location: [facility] OR;  Service: Vascular;  Laterality: Left;  RN PRE OP 4-23-19.  NO H & P, No Cosent  [place]    AORTOGRAPHY WITH SERIALOGRAPHY Right 10/6/2020    Procedure: AORTOGRAM, WITH SERIALOGRAPHY, RIGHT LOWER EXTREMITY ANGIOGRAM, POSSIBLE INTERVENTION;  Surgeon: Mitch Chan MD;  Location: [facility] OR;  Service: Vascular;  Laterality: Right;  RN PREOP 10/1/2020--COVID NEGATIVE ON 10/5    BAERVELDT GLAUCOMA IMPLANT Left 2012    WITH CATARACT EXTRACTION//DONE AT [facility]    CARDIAC CATHETERIZATION Left 05/2016    CARDIAC DEFIBRILLATOR PLACEMENT Left 11/02/2018    CATARACT EXTRACTION W/  INTRAOCULAR LENS IMPLANT Left 2012    WITH BAERVEDT//DONE AT [facility]    CATARACT EXTRACTION W/  INTRAOCULAR LENS IMPLANT Right 09/26/2018    COMPLEX ()    CB DESTRUCTION WITH CYCLO G6 Left 02/15/2017        CYST REMOVAL      DEBRIDEMENT OF FOOT  12/28/2018    Procedure: DEBRIDEMENT, FOOT;  Surgeon: Mitch Chan MD;  Location: [facility] OR;  Service: Vascular;;    DEBRIDEMENT  OF FOOT  6/5/2019    Procedure: DEBRIDEMENT, FOOT;  Surgeon: Mitch Wall MD;  Location: [facility] OR;  Service: Vascular;;    EXAMINATION UNDER ANESTHESIA Left 12/5/2018    Procedure: Exam under anesthesia, left foot debridement, washout and all other indicated procedures;  Surgeon: Mitch Wall MD;  Location: [facility] OR;  Service: Vascular;  Laterality: Left;  1030AM START  RN PREOP 12/3/2018-----AICD  SEEN BY DR JAMES 12/4    EXAMINATION UNDER ANESTHESIA Left 2/13/2019    Procedure: LEFT FOOT WOUND DEBRIDEMENT, WASHOUT AND ALL OTHER INDICATED PROCEDURES;  Surgeon: Mitch Wall MD;  Location: [facility] OR;  Service: Vascular;  Laterality: Left;  requesting 1st case start  THIS CASE 1ST PER DR WALL ON 2/1/19 @ 844AM -LO  RN PREOP 2/6/2019  HAS CARDIAC CLEARANCE    EXAMINATION UNDER ANESTHESIA Left 12/28/2018    Procedure: Exam under anesthesia, left foot debridement, left foot washout, possible left second through fifth metatarsal resection;  Surgeon: Mitch Wall MD;  Location: [facility] OR;  Service: Vascular;  Laterality: Left;  1:00pm start per julissa @ 8:58am  RN  PHONE PRE OP 12-27-18--=Pt coming from [facility]  NEED CONSENT    EXAMINATION UNDER ANESTHESIA Left 6/5/2019    Procedure: LEFT FOOT DEBRIDEMENT, WASHOUT AND ALL OTHER INDICATED PROCEDURES;  Surgeon: Mitch Wall MD;  Location: [facility] OR;  Service: Vascular;  Laterality: Left;  RN PRE OP 5-31-19------ICD------NEED CONSENT    EXAMINATION UNDER ANESTHESIA Right 11/9/2020    Procedure: RIGHT FOOT DEBRIDEMENT, WASHOUT AND ALL OTHER INDICATED PROCEDURES;  Surgeon: Mitch Wall MD;  Location: [facility] OR;  Service: Vascular;  Laterality: Right;  RN PREOP 10/27/2020, --COVID NEGATIVE ON 11/6, has cardiac clearance/[place] 10/27/2020, pt has ICD  NEEDS ORDERS    INCISION AND DRAINAGE Left 11/14/2018    Procedure: Incision and Drainage, left lower extremity debridement, washout;  Surgeon: Mitch Wall MD;   Location: [facility] OR;  Service: Vascular;  Laterality: Left;    INCISION AND DRAINAGE Left 11/16/2018    Procedure: INCISION AND DRAINAGE;  Surgeon: Mitch Chan MD;  Location: [facility] OR;  Service: Vascular;  Laterality: Left;    INTRAOCULAR PROSTHESES INSERTION Right 9/26/2018    Procedure: INSERTION, IOL PROSTHESIS;  Surgeon: Perla Cortés MD;  Location: [facility] OR [address] [place] [address];  Service: Ophthalmology;  Laterality: Right;    PERITONEOCENTESIS N/A 3/1/2019    Procedure: PARACENTESIS, ABDOMINAL, INITIAL;  Surgeon: Dosc Diagnostic Provider;  Location: [facility] OR;  Service: Radiology;  Laterality: N/A;    PERITONEOCENTESIS N/A 3/25/2019    Procedure: PARACENTESIS, ABDOMINAL, INITIAL;  Surgeon: Dosc Diagnostic Provider;  Location: [facility] OR;  Service: Radiology;  Laterality: N/A;    PERITONEOCENTESIS N/A 7/22/2019    Procedure: PARACENTESIS, ABDOMINAL, INITIAL;  Surgeon: Dosc Diagnostic Provider;  Location: [facility] OR;  Service: Radiology;  Laterality: N/A;    PERITONEOCENTESIS N/A 8/16/2019    Procedure: PARACENTESIS, ABDOMINAL, INITIAL;  Surgeon: Dosc Diagnostic Provider;  Location: [facility] OR;  Service: Radiology;  Laterality: N/A;    PERITONEOCENTESIS N/A 9/26/2019    Procedure: PARACENTESIS, ABDOMINAL;  Surgeon: Dosc Diagnostic Provider;  Location: [facility] OR;  Service: Radiology;  Laterality: N/A;    PERITONEOCENTESIS N/A 10/11/2019    Procedure: PARACENTESIS, ABDOMINAL;  Surgeon: Dosc Diagnostic Provider;  Location: [facility] OR;  Service: Radiology;  Laterality: N/A;    PERITONEOCENTESIS N/A 10/31/2019    Procedure: PARACENTESIS, ABDOMINAL;  Surgeon: Dosc Diagnostic Provider;  Location: [facility] OR;  Service: Radiology;  Laterality: N/A;    PERITONEOCENTESIS N/A 11/18/2019    Procedure: PARACENTESIS, ABDOMINAL;  Surgeon: Dosc Diagnostic Provider;  Location: [facility] OR;  Service: Radiology;  Laterality: N/A;    PERITONEOCENTESIS N/A 12/16/2019    Procedure: PARACENTESIS, ABDOMINAL;  Surgeon: Dosc Diagnostic Provider;   Location: [facility] OR;  Service: Radiology;  Laterality: N/A;  2PM START    PERITONEOCENTESIS N/A 2/7/2020    Procedure: PARACENTESIS, ABDOMINAL;  Surgeon: Dosc Diagnostic Provider;  Location: [facility] OR;  Service: Radiology;  Laterality: N/A;  REQUESTED 10:30A START    PERITONEOCENTESIS N/A 2/19/2020    Procedure: PARACENTESIS, ABDOMINAL;  Surgeon: Dosc Diagnostic Provider;  Location: [facility] OR;  Service: Radiology;  Laterality: N/A;    PERITONEOCENTESIS N/A 2/28/2020    Procedure: PARACENTESIS, ABDOMINAL;  Surgeon: Dos Diagnostic Provider;  Location: [facility] OR;  Service: Radiology;  Laterality: N/A;  REQUESTED 10AM START    PHACOEMULSIFICATION OF CATARACT Right 9/26/2018    Procedure: PHACOEMULSIFICATION, CATARACT;  Surgeon: Perla Cortés MD;  [facility];  Service: Ophthalmology;  Laterality: Right;    REPEAT ABDOMINAL PARACENTESIS N/A 2/11/2019    Procedure: PARACENTESIS, ABDOMINAL, REPEAT;  Surgeon: [facility] Diagnostic Provider;  Location: [facility] OR;  Service: Radiology;  Laterality: N/A;  1PM START    REPEAT ABDOMINAL PARACENTESIS N/A 9/12/2019    Procedure: PARACENTESIS, ABDOMINAL, REPEAT;  Surgeon: Dos Diagnostic Provider;  Location: [facility] OR;  Service: Radiology;  Laterality: N/A;  9AM START    REPEAT ABDOMINAL PARACENTESIS N/A 12/2/2019    Procedure: PARACENTESIS, ABDOMINAL, REPEAT;  Surgeon: Dos Diagnostic Provider;  Location: [facility] OR;  Service: Radiology;  Laterality: N/A;  3PM START    REPEAT ABDOMINAL PARACENTESIS N/A 1/10/2020    Procedure: PARACENTESIS, ABDOMINAL, REPEAT;  Surgeon: Dos Diagnostic Provider;  Location: [facility] OR;  Service: Radiology;  Laterality: N/A;  1130AM START    REPEAT ABDOMINAL PARACENTESIS N/A 1/20/2020    Procedure: PARACENTESIS, ABDOMINAL, REPEAT;  Surgeon: Dos Diagnostic Provider;  Location: [facility] OR;  Service: Radiology;  Laterality: N/A;  1PM START    REPEAT ABDOMINAL PARACENTESIS N/A 1/31/2020    Procedure: PARACENTESIS, ABDOMINAL, REPEAT;  Surgeon: Dosc  Diagnostic Provider;  Location: [facility] OR;  Service: Radiology;  Laterality: N/A;  10AM START    REPEAT ABDOMINAL PARACENTESIS N/A 3/16/2020    Procedure: PARACENTESIS, ABDOMINAL, REPEAT;  Surgeon: [facility] Diagnostic Provider;  Location: [facility] OR;  Service: Radiology;  Laterality: N/A;  10AM START    REPEAT ABDOMINAL PARACENTESIS N/A 3/30/2020    Procedure: PARACENTESIS, ABDOMINAL, REPEAT;  Surgeon: Dosc Diagnostic Provider;  Location: [facility] OR;  Service: Radiology;  Laterality: N/A;    REPEAT ABDOMINAL PARACENTESIS N/A 4/9/2020    Procedure: PARACENTESIS, ABDOMINAL, REPEAT;  Surgeon: Dos Diagnostic Provider;  Location: [facility] OR;  Service: Radiology;  Laterality: N/A;  1130AM START    REPEAT ABDOMINAL PARACENTESIS N/A 5/8/2020    Procedure: PARACENTESIS, ABDOMINAL, REPEAT;  Surgeon: [facility] Diagnostic Provider;  Location: [facility] OR;  Service: Radiology;  Laterality: N/A;  9AM START    REPEAT ABDOMINAL PARACENTESIS N/A 5/21/2020    Procedure: PARACENTESIS, ABDOMINAL, REPEAT;  Surgeon: Dos Diagnostic Provider;  Location: [facility] OR;  Service: Radiology;  Laterality: N/A;  10AM START    REPEAT ABDOMINAL PARACENTESIS N/A 6/5/2020    Procedure: PARACENTESIS, ABDOMINAL, REPEAT;  Surgeon: Dos Diagnostic Provider;  Location: [facility] OR;  Service: Radiology;  Laterality: N/A;  NOON START    REPEAT ABDOMINAL PARACENTESIS N/A 7/10/2020    Procedure: PARACENTESIS, ABDOMINAL, REPEAT;  Surgeon: Dos Diagnostic Provider;  Location: [facility] OR;  Service: Radiology;  Laterality: N/A;  1PM START    REPEAT ABDOMINAL PARACENTESIS N/A 8/20/2020    Procedure: PARACENTESIS, ABDOMINAL, REPEAT;  Surgeon: Dos Diagnostic Provider;  Location: [facility] OR;  Service: Radiology;  Laterality: N/A;  1PM START    REPEAT ABDOMINAL PARACENTESIS N/A 9/4/2020    Procedure: PARACENTESIS, ABDOMINAL, REPEAT;  Surgeon: Dos Diagnostic Provider;  Location: [facility] OR;  Service: Radiology;  Laterality: N/A;  11 AM START    REPEAT ABDOMINAL PARACENTESIS N/A 9/16/2020     Procedure: PARACENTESIS, ABDOMINAL, REPEAT;  Surgeon: [facility] Diagnostic Provider;  Location: [facility] OR;  Service: Radiology;  Laterality: N/A;  START 2:00 P.M.    REPEAT ABDOMINAL PARACENTESIS N/A 9/28/2020    Procedure: PARACENTESIS, ABDOMINAL, REPEAT;  Surgeon: [facility] Diagnostic Provider;  Location: [facility] OR;  Service: Radiology;  Laterality: N/A;  1 P.M. START    REPEAT ABDOMINAL PARACENTESIS N/A 11/3/2020    Procedure: PARACENTESIS, ABDOMINAL, REPEAT;  Surgeon: [facility] Diagnostic Provider;  Location: [facility] OR;  Service: Radiology;  Laterality: N/A;  11AM START    REPEAT ABDOMINAL PARACENTESIS N/A 11/13/2020    Procedure: PARACENTESIS, ABDOMINAL, REPEAT;  Surgeon: [facility] Diagnostic Provider;  Location: [facility] OR;  Service: Radiology;  Laterality: N/A;  12PM START    REVISION OF PROCEDURE INVOLVING GLAUCOMA DRAINAGE DEVICE Left 10/2/2019    EXTRUDING BAERVELDT /WITH PERICARDIAL PATCH GRAFT ()    Right foot surgery  10/2014    TOE AMPUTATION Right     first and second    TOE AMPUTATION Left 11/16/2018    Procedure: AMPUTATION, TOES  2-5;  Surgeon: Mitch Chan MD;  Location: [facility] OR;  Service: Vascular;  Laterality: Left;    TOE AMPUTATION Left 12/5/2018    Procedure: AMPUTATION, TOE;  Surgeon: Mitch Chan MD;  Location: [facility];  Service: Vascular;  Laterality: Left;  left great toe    TONSILLECTOMY       Family History   Problem Relation Age of Onset    Diabetes Mother     Heart disease Brother     No Known Problems Father     No Known Problems Sister     No Known Problems Maternal Aunt     No Known Problems Maternal Uncle     No Known Problems Paternal Aunt     No Known Problems Paternal Uncle     No Known Problems Maternal Grandmother     No Known Problems Maternal Grandfather     No Known Problems Paternal Grandmother     No Known Problems Paternal Grandfather     Anemia Neg Hx     Arrhythmia Neg Hx     Asthma Neg Hx     Clotting disorder Neg Hx     Fainting Neg Hx      Heart attack Neg Hx     Heart failure Neg Hx     Hyperlipidemia Neg Hx     Hypertension Neg Hx     Stroke Neg Hx     Atrial Septal Defect Neg Hx     Amblyopia Neg Hx     Blindness Neg Hx     Glaucoma Neg Hx     Macular degeneration Neg Hx     Retinal detachment Neg Hx     Strabismus Neg Hx      Social History     Socioeconomic History    Marital status: Single     Spouse name: Not on file    Number of children: Not on file    Years of education: 14    Highest education level: Not on file   Occupational History    Not on file   Social Needs    Financial resource strain: Not on file    Food insecurity     Worry: Not on file     Inability: Not on file    Transportation needs     Medical: Not on file     Non-medical: Not on file   Tobacco Use    Smoking status: Former Smoker     Packs/day: 1.00     Years: 3.00     Pack years: 3.00     Types: Cigarettes     Quit date: 1984     Years since quittin.3    Smokeless tobacco: Never Used   Substance and Sexual Activity    Alcohol use: Not Currently     Alcohol/week: 1.0 standard drinks     Types: 1 Cans of beer per week     Comment: rarely    Drug use: No    Sexual activity: Not Currently     Partners: Female   Lifestyle    Physical activity     Days per week: Not on file     Minutes per session: Not on file    Stress: Not on file   Relationships    Social connections     Talks on phone: Not on file     Gets together: Not on file     Attends Sabianist service: Not on file     Active member of club or organization: Not on file     Attends meetings of clubs or organizations: Not on file     Relationship status: Not on file   Other Topics Concern    Not on file   Social History Narrative    Not on file       Current Outpatient Medications:     allopurinoL (ZYLOPRIM) 300 MG tablet, Take 1 tablet (300 mg total) by mouth once daily., Disp: 30 tablet, Rfl: 3    aspirin (ECOTRIN) 81 MG EC tablet, Take 81 mg by mouth once daily., Disp: , Rfl:     " brimonidine 0.2% (ALPHAGAN) 0.2 % Drop, Place 1 drop into both eyes 3 (three) times daily., Disp: 10 mL, Rfl: 11    carvediloL (COREG) 3.125 MG tablet, TAKE 1 TABLET(3.125 MG) BY MOUTH TWICE DAILY, Disp: 60 tablet, Rfl: 1    coenzyme Q10 100 mg capsule, Take 100 mg by mouth every morning., Disp: , Rfl:     collagenase (SANTYL) ointment, Apply topically once daily., Disp: 90 g, Rfl: 0    dorzolamide-timolol 2-0.5% (COSOPT) 22.3-6.8 mg/mL ophthalmic solution, Place 1 drop into both eyes 3 (three) times daily., Disp: 1 Bottle, Rfl: 11    ezetimibe (ZETIA) 10 mg tablet, TAKE 1 TABLET(10 MG) BY MOUTH EVERY DAY, Disp: 90 tablet, Rfl: 0    fish oil-omega-3 fatty acids 300-1,000 mg capsule, Take 1 capsule by mouth 2 (two) times daily., Disp: 60 capsule, Rfl: 3    gabapentin (NEURONTIN) 100 MG capsule, TAKE 2 CAPSULES(200 MG) BY MOUTH EVERY EVENING, Disp: 60 capsule, Rfl: 1    HYDROcodone-acetaminophen (NORCO) 7.5-325 mg per tablet, Take 1 tablet by mouth every 8 (eight) hours as needed for Pain., Disp: 30 tablet, Rfl: 0    insulin (LANTUS SOLOSTAR U-100 INSULIN) glargine 100 units/mL (3mL) SubQ pen, Inject 35 units once daily, Disp: 15 mL, Rfl: 3    insulin degludec-liraglutide (XULTOPHY 100/3.6) 100 unit-3.6 mg /mL (3 mL) InPn, Inject 35 Units into the skin once daily., Disp: , Rfl:     MULTIVIT,THER IRON,CA,FA & MIN (MULTIVITAMIN) Tab, Take 1 tablet by mouth every morning. , Disp: , Rfl:     oxyCODONE-acetaminophen (PERCOCET) 5-325 mg per tablet, Take 1 tablet by mouth every 8 (eight) hours as needed., Disp: 15 tablet, Rfl: 0    pen needle, diabetic (BD ULTRA-FINE AMADOR PEN NEEDLE) 32 gauge x 5/32" Ndle, 1 Device by Misc.(Non-Drug; Combo Route) route 2 (two) times a day., Disp: 100 each, Rfl: 11    torsemide (DEMADEX) 20 MG Tab, Take 4 tablets (80 mg total) by mouth 2 (two) times daily., Disp: 240 tablet, Rfl: 2    bisacodyl (DULCOLAX) 5 mg EC tablet, Take 5 mg by mouth daily as needed for Constipation., " Disp: , Rfl:   No current facility-administered medications for this visit.     Facility-Administered Medications Ordered in Other Visits:     clindamycin 900 MG/50 ML D5W 900 mg/50 mL IVPB 900 mg, 900 mg, Intravenous, Q8H, Mitch Chan MD, 900 mg at 11/09/20 1158    lactated ringers infusion, , Intravenous, Continuous, Tam Reynolds MD    lidocaine (PF) 10 mg/ml (1%) injection 10 mg, 1 mL, Intradermal, Once, Tam Reynolds MD    REVIEW OF SYSTEMS:  General: No fevers or chills; ENT: No sore throat; Allergy and Immunology: no persistent infections; Hematological and Lymphatic: No history of bleeding or easy bruising; Endocrine: negative; Respiratory: no cough, shortness of breath, or wheezing; Cardiovascular: no chest pain or dyspnea on exertion; Gastrointestinal: no abdominal pain/back, change in bowel habits, or bloody stools; Genito-Urinary: no dysuria, trouble voiding, or hematuria; Musculoskeletal: negative; Neurological: no TIA or stroke symptoms; Psychiatric: no nervousness, anxiety or depression.    PHYSICAL EXAM:                General appearance:  Alert, well-appearing, and in no distress.  Oriented to person, place, and time                    Neurological: Normal speech, no focal findings noted; CN II - XII grossly intact. RLE with sensation to light touch, LLE with sensation to light touch.            Musculoskeletal: Digits/nail without cyanosis/clubbing.  Strength 5/5 BLE.                    Neck: Supple, no significant adenopathy                  Chest:  Clear to auscultation, no wheezes, rales or rhonchi, symmetric air entry. No use of accessory muscles               Cardiac: Normal rate and regular rhythm, S1 and S2 normal            Abdomen: Soft, nontender, nondistended, no masses or organomegaly, no hernia     No rebound tenderness noted; bowel sounds normal     No groin adenopathy      Extremities:   2+ R femoral pulse, 2+ L femoral pulse     doppler+ R popliteal pulse, doppler+ L  popliteal pulse     doppler+ R PT pulse, doppler+ L PT pulse     doppler+ R DP pulse, doppler+ L DP pulse     1+ RLE edema, 2+ LLE edema    Skin: RLE with medial ankle, and lower foot medial wounds, bleeding present, no infection or drainage; large calcaneal wound with necrotic fat and muscle, no drainage, min granulation tissue; LLE with minimal brawny edema, improved/smaller foot wound with markedly improved granulation tissue, no odor, no purulence or erythema    LAB RESULTS:  No results found for: CBC  Lab Results   Component Value Date    LABPROT 11.2 05/31/2019    INR 1.1 05/31/2019     Lab Results   Component Value Date     10/27/2020    K 4.6 10/27/2020     10/27/2020    CO2 29 10/27/2020    GLU 87 10/27/2020    BUN 59 (H) 10/27/2020    CREATININE 1.5 (H) 10/27/2020    CALCIUM 8.9 10/27/2020    ANIONGAP 8 10/27/2020    EGFRNONAA 49 (A) 10/27/2020     Lab Results   Component Value Date    WBC 6.66 10/27/2020    RBC 4.00 (L) 10/27/2020    HGB 11.5 (L) 10/27/2020    HCT 36.0 (L) 10/27/2020    MCV 90 10/27/2020    MCH 28.8 10/27/2020    MCHC 31.9 (L) 10/27/2020    RDW 18.3 (H) 10/27/2020     10/27/2020    MPV 9.6 10/27/2020    GRAN 5.0 10/27/2020    GRAN 74.4 (H) 10/27/2020    LYMPH 0.5 (L) 10/27/2020    LYMPH 7.1 (L) 10/27/2020    MONO 0.7 10/27/2020    MONO 11.1 10/27/2020    EOS 0.4 10/27/2020    BASO 0.06 10/27/2020    EOSINOPHIL 6.2 10/27/2020    BASOPHIL 0.9 10/27/2020    DIFFMETHOD Automated 10/27/2020     .  Lab Results   Component Value Date    HGBA1C 8.7 (H) 09/04/2020       IMAGING:  All pertinent imaging has been reviewed and interpreted independently.    BLE arterial US 1/2019: No focal stenosis    5/16/19: BLE with no HD significant stenosis, SIDRA 0.8/1.46, left ankle pressures 60 and 171    7/29/19: BLE arterial US with approx 50% R TPT stenosis, SIDRA 0.8; LLE showing no HD significant stenosis, SIDRA 0.66    11/4/19: SIDRA 0.4/0.4, DP vessel NC bilaterally, R toe waveform  normal  Right lower extremity arterial ultrasound shows no hemodynamically significant stenosis.  Pressures indicate moderate arterial occlusive disease.  Left lower extremity arterial ultrasound shows no hemodynamically significant stenosis.  Pressures indicate moderate arterial occlusive disease.    5/2020:  1. Right lower extremity pressures and waveforms indicate moderate arterial occlusive disease.  2. Left lower extremity pressures and waveforms indicate no hemodynamically significant arterial occlusive disease.    7/2020:  1. Right lower extremity arterial ultrasound shows approximately 50% TP trunk stenosis and an occluded PT artery.  There is no hemodynamically significant stenosis.  Pressures indicate severe arterial occlusive disease.  2. Left lower extremity arterial ultrasound shows TP trunk hemodynamically significant stenosis and an occluded PT artery.  Pressures indicate severe arterial occlusive disease.    1. Right lower extremity pressures and waveforms indicate moderate to severe arterial occlusive disease.  2. Left lower extremity pressures and waveforms indicate moderate to severe arterial occlusive disease.    10/2020 RLE arterial US showing right TPT stenosis.    IMP/PLAN:  60 y.o. male with   Patient Active Problem List   Diagnosis    Epiretinal membrane, both eyes    Nuclear sclerotic cataract of right eye    Pseudophakia, left eye    Ptosis, left eyelid    DM type 2 with diabetic peripheral neuropathy    Mixed hyperlipidemia    Neovascular glaucoma of both eyes    Tophaceous gout    Essential hypertension    Coronary artery disease involving native coronary artery of native heart without angina pectoris    Neovascular glaucoma of left eye, severe stage    Statin myopathy    Neovascular glaucoma, right eye, mild stage    PVD (peripheral vascular disease)    Right wrist pain    Hepatosplenomegaly    Type 2 diabetes mellitus with left diabetic foot ulcer    Other ascites     MDR Acinetobacter baumannii infection    Debility    Subacute osteomyelitis of left foot    Stage 3 chronic kidney disease    Atherosclerosis of left lower extremity with ulceration of midfoot    CKD stage 3 due to type 2 diabetes mellitus    Anemia due to multiple mechanisms    Persistent proteinuria    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    Ischemic cardiomyopathy    Status post abdominal paracentesis    Diabetic ulcer of right lower leg    Diabetic ulcer of toe of right foot associated with type 2 diabetes mellitus, with bone involvement without evidence of necrosis    Diabetes mellitus due to underlying condition with foot ulcer, without long-term current use of insulin    Type 2 diabetes mellitus with right diabetic foot ulcer    Statin intolerance    Abnormal EKG    Atherosclerosis of native artery of right lower extremity with ulceration of ankle    Venous stasis ulcer with edema of lower leg    being managed by PCP and specialists who is here today for evaluation of L foot wound.    -Improving L foot wound improved s/p debridement 2/13/19, multifactorial due to diabetes, PVD and venous insufficiency with improvement in granulation tissue on Abx due to multidrug resistent bacteria in the past s/p debridements and L tibial angioplasty x 2 with improvement in wound +granulation tissue s/p debridement 6/5/19 and [place] placement; anticipate healing soon  -R heel wound s/p R SFA angioplasty 10/6/20 s/p debridement in OR 11/6/20 with need for further debridement; if poor bleeding in OR rec tibial revascularization - plan for RLE wound debridement under regional anesthesia urgently 11/18/20  -Cont ID rec Abx regimen  -Recommend continued wound care center care - seeing Dr. Mcgee; may benefit from hyperbaric O2 therapy  -Rec elevation of BLE above level of the heart when at rest  -Low sodium diet  -Strict glycemic control    I spent 11 minutes evaluating this patient and greater than  50% of the time was spent counseling, coordinator care and discussing the plan of care.  All questions were answered and patient stated understanding with agreement with the above treatment plan.    Mitch Chan MD [facility]  Vascular and Endovascular Surgery

## 2020-11-16 NOTE — PROGRESS NOTES
Date: 11/16/20    Indications: Patient is a 60 y.o. male with a right lower extremity wound.  The patient would benefit from debridement for limb salvage.    Pre-operative Diagnosis:    1.   Patient Active Problem List    Diagnosis Date Noted    Venous stasis ulcer with edema of lower leg     Atherosclerosis of native artery of right lower extremity with ulceration of ankle 11/09/2020    Statin intolerance 10/27/2020    Abnormal EKG 10/27/2020    Type 2 diabetes mellitus with right diabetic foot ulcer 09/29/2020    Diabetes mellitus due to underlying condition with foot ulcer, without long-term current use of insulin     Diabetic ulcer of toe of right foot associated with type 2 diabetes mellitus, with bone involvement without evidence of necrosis 08/11/2020    Diabetic ulcer of right lower leg 06/18/2020    Status post abdominal paracentesis 05/08/2020    Ischemic cardiomyopathy 01/20/2020    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 10/02/2019    CKD stage 3 due to type 2 diabetes mellitus 05/01/2019    Anemia due to multiple mechanisms 05/01/2019    Persistent proteinuria 05/01/2019    Atherosclerosis of left lower extremity with ulceration of midfoot 04/30/2019    Stage 3 chronic kidney disease 03/11/2019    Subacute osteomyelitis of left foot 01/14/2019    Debility 12/25/2018    MDR Acinetobacter baumannii infection 12/20/2018    Other ascites 12/12/2018    Type 2 diabetes mellitus with left diabetic foot ulcer 10/29/2018    Hepatosplenomegaly 10/12/2018    Right wrist pain 10/11/2018    PVD (peripheral vascular disease) 10/10/2018    Neovascular glaucoma, right eye, mild stage 10/09/2018    Statin myopathy 07/27/2018    Neovascular glaucoma of left eye, severe stage 02/15/2017    Coronary artery disease involving native coronary artery of native heart without angina pectoris 05/31/2016    Essential hypertension 05/06/2016    Tophaceous gout 10/22/2015    Neovascular  glaucoma of both eyes 03/20/2015    DM type 2 with diabetic peripheral neuropathy 10/22/2014    Mixed hyperlipidemia 10/22/2014    Nuclear sclerotic cataract of right eye 08/28/2014    Pseudophakia, left eye 08/28/2014    Ptosis, left eyelid 08/28/2014    Epiretinal membrane, both eyes 08/22/2014         Post-operative Diagnosis:   1. Same    Procedure: Right heel wound debridement. Wound measurement, 15 cm x 12 cm x 0.5 cm         Surgeon: Mitch Chan MD    Assistants: None    Anesthesia: None    Estimate Blood Loss:  Less than 5mL            Specimens: None               Complications:  None; patient tolerated the procedure well.     Procedure in detail: The patient was seen in the clinic room and placed in the supine position on the treatment table. Attention was directed to the ulcer which was located on the right heel. The wound was covered with dry fibrin and a mild callused rim; there was necrotic fat and muscle. No acute signs of infection were noted. The wound was non-tender. The wound was prepped with betadine. Utilizing a scissor, I debrided the wound full-thickness through skin to excise nonviable tissue inside the wound margins down to level of the muscle.  The patient tolerated well. Because of a lack of sensation, anesthesia was not necessary. The wound was flushed with sterile saline. There was appropriate bleeding with debridement which was well controlled with direct pressure. The wound was then dressed with wet to dry dressing. The patient tolerated the procedure well.

## 2020-11-16 NOTE — H&P (VIEW-ONLY)
Mitch Chan MD [facility]                         11/16/2020    HPI:  Marvin Ray is a 60 y.o. male with   Patient Active Problem List   Diagnosis    Epiretinal membrane, both eyes    Nuclear sclerotic cataract of right eye    Pseudophakia, left eye    Ptosis, left eyelid    DM type 2 with diabetic peripheral neuropathy    Mixed hyperlipidemia    Neovascular glaucoma of both eyes    Tophaceous gout    Essential hypertension    Coronary artery disease involving native coronary artery of native heart without angina pectoris    Neovascular glaucoma of left eye, severe stage    Statin myopathy    Neovascular glaucoma, right eye, mild stage    PVD (peripheral vascular disease)    Right wrist pain    Hepatosplenomegaly    Type 2 diabetes mellitus with left diabetic foot ulcer    Other ascites    MDR Acinetobacter baumannii infection    Debility    Subacute osteomyelitis of left foot    Stage 3 chronic kidney disease    Atherosclerosis of left lower extremity with ulceration of midfoot    CKD stage 3 due to type 2 diabetes mellitus    Anemia due to multiple mechanisms    Persistent proteinuria    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    Ischemic cardiomyopathy    Status post abdominal paracentesis    Diabetic ulcer of right lower leg    Diabetic ulcer of toe of right foot associated with type 2 diabetes mellitus, with bone involvement without evidence of necrosis    Diabetes mellitus due to underlying condition with foot ulcer, without long-term current use of insulin    Type 2 diabetes mellitus with right diabetic foot ulcer    Statin intolerance    Abnormal EKG    Atherosclerosis of native artery of right lower extremity with ulceration of ankle    Venous stasis ulcer with edema of lower leg    being managed by PCP and specialists who is here today for evaluation of L foot wound.  Seen in hospital last mo  with LLE cellulitis.  Treated with Abx.  Also had paracentesis for ascites with negative w/u per family.  Patient states location is L foot occurring for 1-2 mo, worsening foot wound although improvement in edema and erythema.  Associated signs and symptoms include pain and edema.  Quality is aching and severity is 5/10.  Symptoms began 10/2018 spontaneously with blistering and severe edema.  Alleviating factors include wound care and elevation.  Worsening factors include pressure.    Tobacco use: denies    1/4/19: s/p LLE angioplasty and multiple subsequent OR and bedside debridements.  On IV Abx per culture results.  Glucose better controlled.  No fevers.  Receiving local wound care with Santyl.    1/14/19:  Cont to receive local wound care.  Pseudomonas in tissue and bone cultures multidrug resistant other than aminoglycoside; d/w Dr. Velez with high risk of ESRD with 6 weeks of aminoglycoside treatment.  No F/C.    1/31/19:  Pt without complaints.  Was started back on Bactrim.  Family doing wound care.    2/28/19: S/p L foot debridement 2/13/19.  Started on Meropenem.    3/28/19:  No complaints.  S/p paracentesis and pt feels better.    5/16/19:  S/p L peroneal and AT angioplasty, wound healing well.    9/30/19:  S/p L foot debridement 6/5/19. WV placed.    11/2019:  States wound is healing well.  Did not see Plastic Surgery clinic.  Cont wound care center and home health wound care.    2/2020: States wound nearly fully healed.  Receiving wound care daily.    5/2020:  Wounds healing.  Seeing Dr. Davis next week.    7/2020:  Doing well with L foot wound, has developed a R medial maleolus wound due to trauma    8/2020:  Wounds healing BLE.    9/2020: RLE wounds not healing well.  No F/C.    10/2020:  S/p R SFA angioplasty with a 5x60 mm Ultraverse balloon 10/6/20.  States wounds look better, receiving daily wound care.    11/2020:  S/p RLE wounds debridement 11/6/20.  Doing well, +cultures on Abx and receiving  wound care by Dr. Mcgee [facility] at [facility]    Past Medical History:   Diagnosis Date    Arthritis     Cellulitis     CKD (chronic kidney disease), stage III     Coronary artery disease     Diabetes mellitus     Diabetic retinopathy     Diabetic ulcer of left foot     Glaucoma     Gout     Hyperlipemia     Hypertension     ICD (implantable cardioverter-defibrillator) in place 11/02/2018    Left chest    Non-pressure chronic ulcer of other part of left foot with fat layer exposed 10/23/2018    PVD (peripheral vascular disease)     Type 2 diabetes mellitus with left diabetic foot ulcer 10/29/2018    Unsteady gait     uses a wheelchair     Past Surgical History:   Procedure Laterality Date    AHMED GLAUCOMA IMPLANT Left 2011    DONE AT [facility]    AORTOGRAPHY WITH SERIALOGRAPHY Left 4/30/2019    Procedure: AORTOGRAM, WITH SERIALOGRAPHY, LEFT LOWER EXTREMITY, POSSIBLE INTERVENTION;  Surgeon: Mitch Chan MD;  Location: [facility] OR;  Service: Vascular;  Laterality: Left;  RN PRE OP 4-23-19.  NO H & P, No Cosent  [place]    AORTOGRAPHY WITH SERIALOGRAPHY Right 10/6/2020    Procedure: AORTOGRAM, WITH SERIALOGRAPHY, RIGHT LOWER EXTREMITY ANGIOGRAM, POSSIBLE INTERVENTION;  Surgeon: Mitch Chan MD;  Location: [facility] OR;  Service: Vascular;  Laterality: Right;  RN PREOP 10/1/2020--COVID NEGATIVE ON 10/5    BAERVELDT GLAUCOMA IMPLANT Left 2012    WITH CATARACT EXTRACTION//DONE AT [facility]    CARDIAC CATHETERIZATION Left 05/2016    CARDIAC DEFIBRILLATOR PLACEMENT Left 11/02/2018    CATARACT EXTRACTION W/  INTRAOCULAR LENS IMPLANT Left 2012    WITH BAERVEDT//DONE AT [facility]    CATARACT EXTRACTION W/  INTRAOCULAR LENS IMPLANT Right 09/26/2018    COMPLEX ()    CB DESTRUCTION WITH CYCLO G6 Left 02/15/2017        CYST REMOVAL      DEBRIDEMENT OF FOOT  12/28/2018    Procedure: DEBRIDEMENT, FOOT;  Surgeon: Mitch Chan MD;  Location: [facility] OR;  Service: Vascular;;    DEBRIDEMENT  OF FOOT  6/5/2019    Procedure: DEBRIDEMENT, FOOT;  Surgeon: Mitch Wall MD;  Location: [facility] OR;  Service: Vascular;;    EXAMINATION UNDER ANESTHESIA Left 12/5/2018    Procedure: Exam under anesthesia, left foot debridement, washout and all other indicated procedures;  Surgeon: Mitch Wall MD;  Location: [facility] OR;  Service: Vascular;  Laterality: Left;  1030AM START  RN PREOP 12/3/2018-----AICD  SEEN BY DR JAMES 12/4    EXAMINATION UNDER ANESTHESIA Left 2/13/2019    Procedure: LEFT FOOT WOUND DEBRIDEMENT, WASHOUT AND ALL OTHER INDICATED PROCEDURES;  Surgeon: Mitch Wall MD;  Location: [facility] OR;  Service: Vascular;  Laterality: Left;  requesting 1st case start  THIS CASE 1ST PER DR WALL ON 2/1/19 @ 844AM -[place]  RN PREOP 2/6/2019  HAS CARDIAC CLEARANCE    EXAMINATION UNDER ANESTHESIA Left 12/28/2018    Procedure: Exam under anesthesia, left foot debridement, left foot washout, possible left second through fifth metatarsal resection;  Surgeon: Mitch Wall MD;  Location: [facility] OR;  Service: Vascular;  Laterality: Left;  1:00pm start per julissa @ 8:58am  RN  PHONE PRE OP 12-27-18--=Pt coming [facility]  NEED CONSENT    EXAMINATION UNDER ANESTHESIA Left 6/5/2019    Procedure: LEFT FOOT DEBRIDEMENT, WASHOUT AND ALL OTHER INDICATED PROCEDURES;  Surgeon: Mitch Wall MD;  Location: [facility] OR;  Service: Vascular;  Laterality: Left;  RN PRE OP 5-31-19------ICD------NEED CONSENT    EXAMINATION UNDER ANESTHESIA Right 11/9/2020    Procedure: RIGHT FOOT DEBRIDEMENT, WASHOUT AND ALL OTHER INDICATED PROCEDURES;  Surgeon: Mitch Wall MD;  Location: [facility] OR;  Service: Vascular;  Laterality: Right;  RN PREOP 10/27/2020, --COVID NEGATIVE ON 11/6, has cardiac clearance/[place] 10/27/2020, pt has ICD  NEEDS ORDERS    INCISION AND DRAINAGE Left 11/14/2018    Procedure: Incision and Drainage, left lower extremity debridement, washout;  Surgeon: Mitch Wall MD;   Location: [facility] OR;  Service: Vascular;  Laterality: Left;    INCISION AND DRAINAGE Left 11/16/2018    Procedure: INCISION AND DRAINAGE;  Surgeon: Mitch Chan MD;  Location: [facility] OR;  Service: Vascular;  Laterality: Left;    INTRAOCULAR PROSTHESES INSERTION Right 9/26/2018    Procedure: INSERTION, IOL PROSTHESIS;  Surgeon: Perla Cortés MD;  Location: [facility] OR [address] [place] [address];  Service: Ophthalmology;  Laterality: Right;    PERITONEOCENTESIS N/A 3/1/2019    Procedure: PARACENTESIS, ABDOMINAL, INITIAL;  Surgeon: Dosc Diagnostic Provider;  Location: [facility] OR;  Service: Radiology;  Laterality: N/A;    PERITONEOCENTESIS N/A 3/25/2019    Procedure: PARACENTESIS, ABDOMINAL, INITIAL;  Surgeon: Dosc Diagnostic Provider;  Location: [facility] OR;  Service: Radiology;  Laterality: N/A;    PERITONEOCENTESIS N/A 7/22/2019    Procedure: PARACENTESIS, ABDOMINAL, INITIAL;  Surgeon: Dosc Diagnostic Provider;  Location: [facility] OR;  Service: Radiology;  Laterality: N/A;    PERITONEOCENTESIS N/A 8/16/2019    Procedure: PARACENTESIS, ABDOMINAL, INITIAL;  Surgeon: Dosc Diagnostic Provider;  Location: [facility] OR;  Service: Radiology;  Laterality: N/A;    PERITONEOCENTESIS N/A 9/26/2019    Procedure: PARACENTESIS, ABDOMINAL;  Surgeon: Dosc Diagnostic Provider;  Location: [facility] OR;  Service: Radiology;  Laterality: N/A;    PERITONEOCENTESIS N/A 10/11/2019    Procedure: PARACENTESIS, ABDOMINAL;  Surgeon: Dosc Diagnostic Provider;  Location: [facility] OR;  Service: Radiology;  Laterality: N/A;    PERITONEOCENTESIS N/A 10/31/2019    Procedure: PARACENTESIS, ABDOMINAL;  Surgeon: Dosc Diagnostic Provider;  Location: [facility] OR;  Service: Radiology;  Laterality: N/A;    PERITONEOCENTESIS N/A 11/18/2019    Procedure: PARACENTESIS, ABDOMINAL;  Surgeon: Dosc Diagnostic Provider;  Location: [facility] OR;  Service: Radiology;  Laterality: N/A;    PERITONEOCENTESIS N/A 12/16/2019    Procedure: PARACENTESIS, ABDOMINAL;  Surgeon: Dosc Diagnostic Provider;   Location: [facility] OR;  Service: Radiology;  Laterality: N/A;  2PM START    PERITONEOCENTESIS N/A 2/7/2020    Procedure: PARACENTESIS, ABDOMINAL;  Surgeon: Dosc Diagnostic Provider;  Location: [facility] OR;  Service: Radiology;  Laterality: N/A;  REQUESTED 10:30A START    PERITONEOCENTESIS N/A 2/19/2020    Procedure: PARACENTESIS, ABDOMINAL;  Surgeon: Dosc Diagnostic Provider;  Location: [facility] OR;  Service: Radiology;  Laterality: N/A;    PERITONEOCENTESIS N/A 2/28/2020    Procedure: PARACENTESIS, ABDOMINAL;  Surgeon: Dos Diagnostic Provider;  Location: [facility] OR;  Service: Radiology;  Laterality: N/A;  REQUESTED 10AM START    PHACOEMULSIFICATION OF CATARACT Right 9/26/2018    Procedure: PHACOEMULSIFICATION, CATARACT;  Surgeon: Perla Cortés MD;  [facility];  Service: Ophthalmology;  Laterality: Right;    REPEAT ABDOMINAL PARACENTESIS N/A 2/11/2019    Procedure: PARACENTESIS, ABDOMINAL, REPEAT;  Surgeon: [facility] Diagnostic Provider;  Location: [facility] OR;  Service: Radiology;  Laterality: N/A;  1PM START    REPEAT ABDOMINAL PARACENTESIS N/A 9/12/2019    Procedure: PARACENTESIS, ABDOMINAL, REPEAT;  Surgeon: Dos Diagnostic Provider;  Location: [facility] OR;  Service: Radiology;  Laterality: N/A;  9AM START    REPEAT ABDOMINAL PARACENTESIS N/A 12/2/2019    Procedure: PARACENTESIS, ABDOMINAL, REPEAT;  Surgeon: Dos Diagnostic Provider;  Location: [facility] OR;  Service: Radiology;  Laterality: N/A;  3PM START    REPEAT ABDOMINAL PARACENTESIS N/A 1/10/2020    Procedure: PARACENTESIS, ABDOMINAL, REPEAT;  Surgeon: Dos Diagnostic Provider;  Location: [facility] OR;  Service: Radiology;  Laterality: N/A;  1130AM START    REPEAT ABDOMINAL PARACENTESIS N/A 1/20/2020    Procedure: PARACENTESIS, ABDOMINAL, REPEAT;  Surgeon: Dos Diagnostic Provider;  Location: [facility] OR;  Service: Radiology;  Laterality: N/A;  1PM START    REPEAT ABDOMINAL PARACENTESIS N/A 1/31/2020    Procedure: PARACENTESIS, ABDOMINAL, REPEAT;  Surgeon: Dosc  Diagnostic Provider;  Location: [facility] OR;  Service: Radiology;  Laterality: N/A;  10AM START    REPEAT ABDOMINAL PARACENTESIS N/A 3/16/2020    Procedure: PARACENTESIS, ABDOMINAL, REPEAT;  Surgeon: [facility] Diagnostic Provider;  Location: [facility] OR;  Service: Radiology;  Laterality: N/A;  10AM START    REPEAT ABDOMINAL PARACENTESIS N/A 3/30/2020    Procedure: PARACENTESIS, ABDOMINAL, REPEAT;  Surgeon: Dosc Diagnostic Provider;  Location: [facility] OR;  Service: Radiology;  Laterality: N/A;    REPEAT ABDOMINAL PARACENTESIS N/A 4/9/2020    Procedure: PARACENTESIS, ABDOMINAL, REPEAT;  Surgeon: Dos Diagnostic Provider;  Location: [facility] OR;  Service: Radiology;  Laterality: N/A;  1130AM START    REPEAT ABDOMINAL PARACENTESIS N/A 5/8/2020    Procedure: PARACENTESIS, ABDOMINAL, REPEAT;  Surgeon: [facility] Diagnostic Provider;  Location: [facility] OR;  Service: Radiology;  Laterality: N/A;  9AM START    REPEAT ABDOMINAL PARACENTESIS N/A 5/21/2020    Procedure: PARACENTESIS, ABDOMINAL, REPEAT;  Surgeon: Dos Diagnostic Provider;  Location: [facility] OR;  Service: Radiology;  Laterality: N/A;  10AM START    REPEAT ABDOMINAL PARACENTESIS N/A 6/5/2020    Procedure: PARACENTESIS, ABDOMINAL, REPEAT;  Surgeon: Dos Diagnostic Provider;  Location: [facility] OR;  Service: Radiology;  Laterality: N/A;  NOON START    REPEAT ABDOMINAL PARACENTESIS N/A 7/10/2020    Procedure: PARACENTESIS, ABDOMINAL, REPEAT;  Surgeon: Dos Diagnostic Provider;  Location: [facility] OR;  Service: Radiology;  Laterality: N/A;  1PM START    REPEAT ABDOMINAL PARACENTESIS N/A 8/20/2020    Procedure: PARACENTESIS, ABDOMINAL, REPEAT;  Surgeon: Dos Diagnostic Provider;  Location: [facility] OR;  Service: Radiology;  Laterality: N/A;  1PM START    REPEAT ABDOMINAL PARACENTESIS N/A 9/4/2020    Procedure: PARACENTESIS, ABDOMINAL, REPEAT;  Surgeon: Dos Diagnostic Provider;  Location: [facility] OR;  Service: Radiology;  Laterality: N/A;  11 AM START    REPEAT ABDOMINAL PARACENTESIS N/A 9/16/2020     Procedure: PARACENTESIS, ABDOMINAL, REPEAT;  Surgeon: [facility] Diagnostic Provider;  Location: [facility] OR;  Service: Radiology;  Laterality: N/A;  START 2:00 P.M.    REPEAT ABDOMINAL PARACENTESIS N/A 9/28/2020    Procedure: PARACENTESIS, ABDOMINAL, REPEAT;  Surgeon: [facility] Diagnostic Provider;  Location: [facility] OR;  Service: Radiology;  Laterality: N/A;  1 P.M. START    REPEAT ABDOMINAL PARACENTESIS N/A 11/3/2020    Procedure: PARACENTESIS, ABDOMINAL, REPEAT;  Surgeon: [facility] Diagnostic Provider;  Location: [facility] OR;  Service: Radiology;  Laterality: N/A;  11AM START    REPEAT ABDOMINAL PARACENTESIS N/A 11/13/2020    Procedure: PARACENTESIS, ABDOMINAL, REPEAT;  Surgeon: [facility] Diagnostic Provider;  Location: [facility] OR;  Service: Radiology;  Laterality: N/A;  12PM START    REVISION OF PROCEDURE INVOLVING GLAUCOMA DRAINAGE DEVICE Left 10/2/2019    EXTRUDING BAERVELDT /WITH PERICARDIAL PATCH GRAFT ()    Right foot surgery  10/2014    TOE AMPUTATION Right     first and second    TOE AMPUTATION Left 11/16/2018    Procedure: AMPUTATION, TOES  2-5;  Surgeon: Mitch Chan MD;  Location: [facility] OR;  Service: Vascular;  Laterality: Left;    TOE AMPUTATION Left 12/5/2018    Procedure: AMPUTATION, TOE;  Surgeon: Mitch Chan MD;  [facility];  Service: Vascular;  Laterality: Left;  left great toe    TONSILLECTOMY       Family History   Problem Relation Age of Onset    Diabetes Mother     Heart disease Brother     No Known Problems Father     No Known Problems Sister     No Known Problems Maternal Aunt     No Known Problems Maternal Uncle     No Known Problems Paternal Aunt     No Known Problems Paternal Uncle     No Known Problems Maternal Grandmother     No Known Problems Maternal Grandfather     No Known Problems Paternal Grandmother     No Known Problems Paternal Grandfather     Anemia Neg Hx     Arrhythmia Neg Hx     Asthma Neg Hx     Clotting disorder Neg Hx     Fainting Neg Hx      Heart attack Neg Hx     Heart failure Neg Hx     Hyperlipidemia Neg Hx     Hypertension Neg Hx     Stroke Neg Hx     Atrial Septal Defect Neg Hx     Amblyopia Neg Hx     Blindness Neg Hx     Glaucoma Neg Hx     Macular degeneration Neg Hx     Retinal detachment Neg Hx     Strabismus Neg Hx      Social History     Socioeconomic History    Marital status: Single     Spouse name: Not on file    Number of children: Not on file    Years of education: 14    Highest education level: Not on file   Occupational History    Not on file   Social Needs    Financial resource strain: Not on file    Food insecurity     Worry: Not on file     Inability: Not on file    Transportation needs     Medical: Not on file     Non-medical: Not on file   Tobacco Use    Smoking status: Former Smoker     Packs/day: 1.00     Years: 3.00     Pack years: 3.00     Types: Cigarettes     Quit date: 1984     Years since quittin.3    Smokeless tobacco: Never Used   Substance and Sexual Activity    Alcohol use: Not Currently     Alcohol/week: 1.0 standard drinks     Types: 1 Cans of beer per week     Comment: rarely    Drug use: No    Sexual activity: Not Currently     Partners: Female   Lifestyle    Physical activity     Days per week: Not on file     Minutes per session: Not on file    Stress: Not on file   Relationships    Social connections     Talks on phone: Not on file     Gets together: Not on file     Attends Tenriism service: Not on file     Active member of club or organization: Not on file     Attends meetings of clubs or organizations: Not on file     Relationship status: Not on file   Other Topics Concern    Not on file   Social History Narrative    Not on file       Current Outpatient Medications:     allopurinoL (ZYLOPRIM) 300 MG tablet, Take 1 tablet (300 mg total) by mouth once daily., Disp: 30 tablet, Rfl: 3    aspirin (ECOTRIN) 81 MG EC tablet, Take 81 mg by mouth once daily., Disp: , Rfl:     " brimonidine 0.2% (ALPHAGAN) 0.2 % Drop, Place 1 drop into both eyes 3 (three) times daily., Disp: 10 mL, Rfl: 11    carvediloL (COREG) 3.125 MG tablet, TAKE 1 TABLET(3.125 MG) BY MOUTH TWICE DAILY, Disp: 60 tablet, Rfl: 1    coenzyme Q10 100 mg capsule, Take 100 mg by mouth every morning., Disp: , Rfl:     collagenase (SANTYL) ointment, Apply topically once daily., Disp: 90 g, Rfl: 0    dorzolamide-timolol 2-0.5% (COSOPT) 22.3-6.8 mg/mL ophthalmic solution, Place 1 drop into both eyes 3 (three) times daily., Disp: 1 Bottle, Rfl: 11    ezetimibe (ZETIA) 10 mg tablet, TAKE 1 TABLET(10 MG) BY MOUTH EVERY DAY, Disp: 90 tablet, Rfl: 0    fish oil-omega-3 fatty acids 300-1,000 mg capsule, Take 1 capsule by mouth 2 (two) times daily., Disp: 60 capsule, Rfl: 3    gabapentin (NEURONTIN) 100 MG capsule, TAKE 2 CAPSULES(200 MG) BY MOUTH EVERY EVENING, Disp: 60 capsule, Rfl: 1    HYDROcodone-acetaminophen (NORCO) 7.5-325 mg per tablet, Take 1 tablet by mouth every 8 (eight) hours as needed for Pain., Disp: 30 tablet, Rfl: 0    insulin (LANTUS SOLOSTAR U-100 INSULIN) glargine 100 units/mL (3mL) SubQ pen, Inject 35 units once daily, Disp: 15 mL, Rfl: 3    insulin degludec-liraglutide (XULTOPHY 100/3.6) 100 unit-3.6 mg /mL (3 mL) InPn, Inject 35 Units into the skin once daily., Disp: , Rfl:     MULTIVIT,THER IRON,CA,FA & MIN (MULTIVITAMIN) Tab, Take 1 tablet by mouth every morning. , Disp: , Rfl:     oxyCODONE-acetaminophen (PERCOCET) 5-325 mg per tablet, Take 1 tablet by mouth every 8 (eight) hours as needed., Disp: 15 tablet, Rfl: 0    pen needle, diabetic (BD ULTRA-FINE AMADOR PEN NEEDLE) 32 gauge x 5/32" Ndle, 1 Device by Misc.(Non-Drug; Combo Route) route 2 (two) times a day., Disp: 100 each, Rfl: 11    torsemide (DEMADEX) 20 MG Tab, Take 4 tablets (80 mg total) by mouth 2 (two) times daily., Disp: 240 tablet, Rfl: 2    bisacodyl (DULCOLAX) 5 mg EC tablet, Take 5 mg by mouth daily as needed for Constipation., " Disp: , Rfl:   No current facility-administered medications for this visit.     Facility-Administered Medications Ordered in Other Visits:     clindamycin 900 MG/50 ML D5W 900 mg/50 mL IVPB 900 mg, 900 mg, Intravenous, Q8H, Mitch Chan MD, 900 mg at 11/09/20 1158    lactated ringers infusion, , Intravenous, Continuous, Tam Reynolds MD    lidocaine (PF) 10 mg/ml (1%) injection 10 mg, 1 mL, Intradermal, Once, Tam Reynolds MD    REVIEW OF SYSTEMS:  General: No fevers or chills; ENT: No sore throat; Allergy and Immunology: no persistent infections; Hematological and Lymphatic: No history of bleeding or easy bruising; Endocrine: negative; Respiratory: no cough, shortness of breath, or wheezing; Cardiovascular: no chest pain or dyspnea on exertion; Gastrointestinal: no abdominal pain/back, change in bowel habits, or bloody stools; Genito-Urinary: no dysuria, trouble voiding, or hematuria; Musculoskeletal: negative; Neurological: no TIA or stroke symptoms; Psychiatric: no nervousness, anxiety or depression.    PHYSICAL EXAM:                General appearance:  Alert, well-appearing, and in no distress.  Oriented to person, place, and time                    Neurological: Normal speech, no focal findings noted; CN II - XII grossly intact. RLE with sensation to light touch, LLE with sensation to light touch.            Musculoskeletal: Digits/nail without cyanosis/clubbing.  Strength 5/5 BLE.                    Neck: Supple, no significant adenopathy                  Chest:  Clear to auscultation, no wheezes, rales or rhonchi, symmetric air entry. No use of accessory muscles               Cardiac: Normal rate and regular rhythm, S1 and S2 normal            Abdomen: Soft, nontender, nondistended, no masses or organomegaly, no hernia     No rebound tenderness noted; bowel sounds normal     No groin adenopathy      Extremities:   2+ R femoral pulse, 2+ L femoral pulse     doppler+ R popliteal pulse, doppler+ L  popliteal pulse     doppler+ R PT pulse, doppler+ L PT pulse     doppler+ R DP pulse, doppler+ L DP pulse     1+ RLE edema, 2+ LLE edema    Skin: RLE with medial ankle, and lower foot medial wounds, bleeding present, no infection or drainage; large calcaneal wound with necrotic fat and muscle, no drainage, min granulation tissue; LLE with minimal brawny edema, improved/smaller foot wound with markedly improved granulation tissue, no odor, no purulence or erythema    LAB RESULTS:  No results found for: CBC  Lab Results   Component Value Date    LABPROT 11.2 05/31/2019    INR 1.1 05/31/2019     Lab Results   Component Value Date     10/27/2020    K 4.6 10/27/2020     10/27/2020    CO2 29 10/27/2020    GLU 87 10/27/2020    BUN 59 (H) 10/27/2020    CREATININE 1.5 (H) 10/27/2020    CALCIUM 8.9 10/27/2020    ANIONGAP 8 10/27/2020    EGFRNONAA 49 (A) 10/27/2020     Lab Results   Component Value Date    WBC 6.66 10/27/2020    RBC 4.00 (L) 10/27/2020    HGB 11.5 (L) 10/27/2020    HCT 36.0 (L) 10/27/2020    MCV 90 10/27/2020    MCH 28.8 10/27/2020    MCHC 31.9 (L) 10/27/2020    RDW 18.3 (H) 10/27/2020     10/27/2020    MPV 9.6 10/27/2020    GRAN 5.0 10/27/2020    GRAN 74.4 (H) 10/27/2020    LYMPH 0.5 (L) 10/27/2020    LYMPH 7.1 (L) 10/27/2020    MONO 0.7 10/27/2020    MONO 11.1 10/27/2020    EOS 0.4 10/27/2020    BASO 0.06 10/27/2020    EOSINOPHIL 6.2 10/27/2020    BASOPHIL 0.9 10/27/2020    DIFFMETHOD Automated 10/27/2020     .  Lab Results   Component Value Date    HGBA1C 8.7 (H) 09/04/2020       IMAGING:  All pertinent imaging has been reviewed and interpreted independently.    BLE arterial US 1/2019: No focal stenosis    5/16/19: BLE with no HD significant stenosis, SIDRA 0.8/1.46, left ankle pressures 60 and 171    7/29/19: BLE arterial US with approx 50% R TPT stenosis, SIDRA 0.8; LLE showing no HD significant stenosis, SIDRA 0.66    11/4/19: SIDRA 0.4/0.4, DP vessel NC bilaterally, R toe waveform  normal  Right lower extremity arterial ultrasound shows no hemodynamically significant stenosis.  Pressures indicate moderate arterial occlusive disease.  Left lower extremity arterial ultrasound shows no hemodynamically significant stenosis.  Pressures indicate moderate arterial occlusive disease.    5/2020:  1. Right lower extremity pressures and waveforms indicate moderate arterial occlusive disease.  2. Left lower extremity pressures and waveforms indicate no hemodynamically significant arterial occlusive disease.    7/2020:  1. Right lower extremity arterial ultrasound shows approximately 50% TP trunk stenosis and an occluded PT artery.  There is no hemodynamically significant stenosis.  Pressures indicate severe arterial occlusive disease.  2. Left lower extremity arterial ultrasound shows TP trunk hemodynamically significant stenosis and an occluded PT artery.  Pressures indicate severe arterial occlusive disease.    1. Right lower extremity pressures and waveforms indicate moderate to severe arterial occlusive disease.  2. Left lower extremity pressures and waveforms indicate moderate to severe arterial occlusive disease.    10/2020 RLE arterial US showing right TPT stenosis.    IMP/PLAN:  60 y.o. male with   Patient Active Problem List   Diagnosis    Epiretinal membrane, both eyes    Nuclear sclerotic cataract of right eye    Pseudophakia, left eye    Ptosis, left eyelid    DM type 2 with diabetic peripheral neuropathy    Mixed hyperlipidemia    Neovascular glaucoma of both eyes    Tophaceous gout    Essential hypertension    Coronary artery disease involving native coronary artery of native heart without angina pectoris    Neovascular glaucoma of left eye, severe stage    Statin myopathy    Neovascular glaucoma, right eye, mild stage    PVD (peripheral vascular disease)    Right wrist pain    Hepatosplenomegaly    Type 2 diabetes mellitus with left diabetic foot ulcer    Other ascites     MDR Acinetobacter baumannii infection    Debility    Subacute osteomyelitis of left foot    Stage 3 chronic kidney disease    Atherosclerosis of left lower extremity with ulceration of midfoot    CKD stage 3 due to type 2 diabetes mellitus    Anemia due to multiple mechanisms    Persistent proteinuria    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    Ischemic cardiomyopathy    Status post abdominal paracentesis    Diabetic ulcer of right lower leg    Diabetic ulcer of toe of right foot associated with type 2 diabetes mellitus, with bone involvement without evidence of necrosis    Diabetes mellitus due to underlying condition with foot ulcer, without long-term current use of insulin    Type 2 diabetes mellitus with right diabetic foot ulcer    Statin intolerance    Abnormal EKG    Atherosclerosis of native artery of right lower extremity with ulceration of ankle    Venous stasis ulcer with edema of lower leg    being managed by PCP and specialists who is here today for evaluation of L foot wound.    -Improving L foot wound improved s/p debridement 2/13/19, multifactorial due to diabetes, PVD and venous insufficiency with improvement in granulation tissue on Abx due to multidrug resistent bacteria in the past s/p debridements and L tibial angioplasty x 2 with improvement in wound +granulation tissue s/p debridement 6/5/19 and WV placement; anticipate healing soon  -R heel wound s/p R SFA angioplasty 10/6/20 s/p debridement in OR 11/6/20 with need for further debridement; if poor bleeding in OR rec tibial revascularization - plan for RLE wound debridement under regional anesthesia urgently 11/18/20  -Cont ID rec Abx regimen  -Recommend continued wound care center care - seeing Dr. Mcgee; may benefit from hyperbaric O2 therapy  -Rec elevation of BLE above level of the heart when at rest  -Low sodium diet  -Strict glycemic control    I spent 11 minutes evaluating this patient and greater than  50% of the time was spent counseling, coordinator care and discussing the plan of care.  All questions were answered and patient stated understanding with agreement with the above treatment plan.    Mitch Chan MD [facility]  Vascular and Endovascular Surgery

## 2020-11-17 ENCOUNTER — TELEPHONE (OUTPATIENT)
Dept: VASCULAR SURGERY | Facility: CLINIC | Age: 60
End: 2020-11-17

## 2020-11-17 ENCOUNTER — ANESTHESIA EVENT (OUTPATIENT)
Dept: SURGERY | Facility: HOSPITAL | Age: 60
End: 2020-11-17
Payer: MEDICAID

## 2020-11-17 ENCOUNTER — OFFICE VISIT (OUTPATIENT)
Dept: INFECTIOUS DISEASES | Facility: CLINIC | Age: 60
End: 2020-11-17
Payer: MEDICAID

## 2020-11-17 VITALS
WEIGHT: 198.19 LBS | HEIGHT: 70 IN | TEMPERATURE: 98 F | BODY MASS INDEX: 28.37 KG/M2 | DIASTOLIC BLOOD PRESSURE: 68 MMHG | SYSTOLIC BLOOD PRESSURE: 116 MMHG | HEART RATE: 93 BPM

## 2020-11-17 DIAGNOSIS — L97.515 ULCER OF RIGHT FOOT WITH MUSCLE INVOLVEMENT WITHOUT EVIDENCE OF NECROSIS: Primary | ICD-10-CM

## 2020-11-17 DIAGNOSIS — T14.8XXA WOUND INFECTION: ICD-10-CM

## 2020-11-17 DIAGNOSIS — L08.9 WOUND INFECTION: ICD-10-CM

## 2020-11-17 PROCEDURE — 99214 OFFICE O/P EST MOD 30 MIN: CPT | Mod: PBBFAC | Performed by: INTERNAL MEDICINE

## 2020-11-17 PROCEDURE — 99999 PR PBB SHADOW E&M-EST. PATIENT-LVL IV: CPT | Mod: PBBFAC,,, | Performed by: INTERNAL MEDICINE

## 2020-11-17 PROCEDURE — 99214 OFFICE O/P EST MOD 30 MIN: CPT | Mod: S$PBB,,, | Performed by: INTERNAL MEDICINE

## 2020-11-17 PROCEDURE — 99999 PR PBB SHADOW E&M-EST. PATIENT-LVL IV: ICD-10-PCS | Mod: PBBFAC,,, | Performed by: INTERNAL MEDICINE

## 2020-11-17 PROCEDURE — 99214 PR OFFICE/OUTPT VISIT, EST, LEVL IV, 30-39 MIN: ICD-10-PCS | Mod: S$PBB,,, | Performed by: INTERNAL MEDICINE

## 2020-11-17 NOTE — PROGRESS NOTES
Subjective:      Patient ID: Marvin Ray is a 60 y.o. male.    Chief Complaint:Follow-up      History of Present Illness    59 y/o with a history of DM type 2, CAD with CHF s/p AICD placement.  He also has a history of diabetic foot ulcer and osteomyelitis.  He is referred back to my clinic for a right foot heal wound.  He is s/p debridement by vascular surgery and cultures were positive for Pseudomonas, Klebsiella, E faecalis and Group B strep.  He is referred to me for management of this wound.      Review of Systems   Constitution: Negative for chills, decreased appetite, fever, malaise/fatigue, night sweats, weight gain and weight loss.   HENT: Negative for congestion, ear pain, hearing loss, hoarse voice, sore throat and tinnitus.    Eyes: Negative for blurred vision, redness and visual disturbance.   Cardiovascular: Negative for chest pain, leg swelling and palpitations.   Respiratory: Negative for cough, hemoptysis, shortness of breath and sputum production.    Hematologic/Lymphatic: Negative for adenopathy. Does not bruise/bleed easily.   Skin: Negative for dry skin, itching, rash and suspicious lesions.   Musculoskeletal: Negative for back pain, joint pain, myalgias and neck pain.   Gastrointestinal: Negative for abdominal pain, constipation, diarrhea, heartburn, nausea and vomiting.   Genitourinary: Negative for dysuria, flank pain, frequency, hematuria, hesitancy and urgency.   Neurological: Negative for dizziness, headaches, numbness, paresthesias and weakness.   Psychiatric/Behavioral: Negative for depression and memory loss. The patient does not have insomnia and is not nervous/anxious.      Objective:   Physical Exam  Vitals signs and nursing note reviewed.   Constitutional:       General: He is not in acute distress.     Appearance: He is well-developed. He is not diaphoretic.   HENT:      Head: Normocephalic and atraumatic.      Right Ear: External ear normal.      Left Ear: External ear  normal.      Nose: Nose normal.      Mouth/Throat:      Pharynx: No oropharyngeal exudate.   Eyes:      General: No scleral icterus.        Right eye: No discharge.         Left eye: No discharge.      Conjunctiva/sclera: Conjunctivae normal.      Pupils: Pupils are equal, round, and reactive to light.   Neck:      Musculoskeletal: Normal range of motion and neck supple.      Thyroid: No thyromegaly.      Vascular: No JVD.      Trachea: No tracheal deviation.   Cardiovascular:      Rate and Rhythm: Normal rate and regular rhythm.      Heart sounds: No murmur. No friction rub. No gallop.    Pulmonary:      Effort: Pulmonary effort is normal. No respiratory distress.      Breath sounds: Normal breath sounds. No stridor. No wheezing or rales.   Chest:      Chest wall: No tenderness.   Abdominal:      General: Bowel sounds are normal. There is no distension.      Palpations: Abdomen is soft. There is no mass.      Tenderness: There is no abdominal tenderness. There is no guarding or rebound.   Musculoskeletal: Normal range of motion.         General: No tenderness.   Lymphadenopathy:      Cervical: No cervical adenopathy.   Skin:     General: Skin is warm.      Coloration: Skin is not pale.      Findings: No erythema or rash.      Comments: Right foot heal wound.  Picture taken.  Bone not exposed.   Neurological:      Mental Status: He is alert and oriented to person, place, and time.      Cranial Nerves: No cranial nerve deficit.      Motor: No abnormal muscle tone.      Coordination: Coordination normal.      Deep Tendon Reflexes: Reflexes are normal and symmetric. Reflexes normal.   Psychiatric:         Behavior: Behavior normal.         Thought Content: Thought content normal.         Judgment: Judgment normal.                  Assessment:       1. Ulcer of right foot with muscle involvement without evidence of necrosis    2. Wound infection        59 y/o with a history of DM type 2, CAD with CHF s/p AICD placement.   He also has a history of diabetic foot ulcer and osteomyelitis.  He is referred back to my clinic for a right foot heal wound.  He is s/p debridement by vascular surgery and cultures were positive for Pseudomonas, Klebsiella, E faecalis and Group B strep.  He is referred to me for management of this wound. Per op report, wounds did not go down to bone.  On my exam, there is no bone exposed.  I will treat him with IV meropenem for 2 weeks.  Will re-assess at the end of 2 weeks.  Plan:       Ulcer of right foot with muscle involvement without evidence of necrosis   - IV meropenem    Wound infection   - IV meropenem

## 2020-11-17 NOTE — TELEPHONE ENCOUNTER
Left message that arrival time will be 11 tomorrow at [facility]. Asked to please call the office to verify that she received this message.

## 2020-11-18 ENCOUNTER — HOSPITAL ENCOUNTER (OUTPATIENT)
Facility: HOSPITAL | Age: 60
Discharge: HOME | End: 2020-11-18
Attending: SURGERY | Admitting: SURGERY
Payer: MEDICAID

## 2020-11-18 ENCOUNTER — ANESTHESIA (OUTPATIENT)
Dept: SURGERY | Facility: HOSPITAL | Age: 60
End: 2020-11-18
Payer: MEDICAID

## 2020-11-18 VITALS
OXYGEN SATURATION: 95 % | WEIGHT: 200 LBS | DIASTOLIC BLOOD PRESSURE: 67 MMHG | HEIGHT: 70 IN | BODY MASS INDEX: 28.63 KG/M2 | RESPIRATION RATE: 23 BRPM | HEART RATE: 67 BPM | TEMPERATURE: 98 F | SYSTOLIC BLOOD PRESSURE: 117 MMHG

## 2020-11-18 DIAGNOSIS — I70.239 ATHEROSCLEROSIS OF NATIVE ARTERY OF RIGHT LEG WITH ULCERATION: Primary | ICD-10-CM

## 2020-11-18 DIAGNOSIS — I70.233 ATHEROSCLEROSIS OF NATIVE ARTERY OF RIGHT LOWER EXTREMITY WITH ULCERATION OF ANKLE: ICD-10-CM

## 2020-11-18 LAB — POCT GLUCOSE: 94 MG/DL (ref 70–110)

## 2020-11-18 PROCEDURE — 64445 NJX AA&/STRD SCIATIC NRV IMG: CPT | Mod: 59,RT,, | Performed by: ANESTHESIOLOGY

## 2020-11-18 PROCEDURE — 25000003 PHARM REV CODE 250: Performed by: NURSE ANESTHETIST, CERTIFIED REGISTERED

## 2020-11-18 PROCEDURE — 25000003 PHARM REV CODE 250: Performed by: ANESTHESIOLOGY

## 2020-11-18 PROCEDURE — 76942 ECHO GUIDE FOR BIOPSY: CPT | Mod: 26,,, | Performed by: ANESTHESIOLOGY

## 2020-11-18 PROCEDURE — 36000707: Performed by: SURGERY

## 2020-11-18 PROCEDURE — 82962 GLUCOSE BLOOD TEST: CPT | Performed by: SURGERY

## 2020-11-18 PROCEDURE — 76942 ECHO GUIDE FOR BIOPSY: CPT | Performed by: SURGERY

## 2020-11-18 PROCEDURE — D9220A PRA ANESTHESIA: Mod: CRNA,,, | Performed by: NURSE ANESTHETIST, CERTIFIED REGISTERED

## 2020-11-18 PROCEDURE — 25000003 PHARM REV CODE 250: Performed by: SURGERY

## 2020-11-18 PROCEDURE — 71000033 HC RECOVERY, INTIAL HOUR: Performed by: SURGERY

## 2020-11-18 PROCEDURE — 76942 POPLITEAL SCIATIC SINGLE INJECTION BLOCK: ICD-10-PCS | Mod: 26,,, | Performed by: ANESTHESIOLOGY

## 2020-11-18 PROCEDURE — 11043 DBRDMT MUSC&/FSCA 1ST 20/<: CPT | Mod: ,,, | Performed by: SURGERY

## 2020-11-18 PROCEDURE — D9220A PRA ANESTHESIA: Mod: ANES,,, | Performed by: ANESTHESIOLOGY

## 2020-11-18 PROCEDURE — 64447 NJX AA&/STRD FEMORAL NRV IMG: CPT | Performed by: SURGERY

## 2020-11-18 PROCEDURE — D9220A PRA ANESTHESIA: ICD-10-PCS | Mod: ANES,,, | Performed by: ANESTHESIOLOGY

## 2020-11-18 PROCEDURE — 37000008 HC ANESTHESIA 1ST 15 MINUTES: Performed by: SURGERY

## 2020-11-18 PROCEDURE — 71000044 HC DOSC ROUTINE RECOVERY FIRST HOUR: Performed by: SURGERY

## 2020-11-18 PROCEDURE — 64450 NJX AA&/STRD OTHER PN/BRANCH: CPT | Mod: 59,RT,, | Performed by: ANESTHESIOLOGY

## 2020-11-18 PROCEDURE — 01470 ANES PX NRV MSC LW L/A/F NOS: CPT | Performed by: SURGERY

## 2020-11-18 PROCEDURE — 64445 NJX AA&/STRD SCIATIC NRV IMG: CPT | Performed by: SURGERY

## 2020-11-18 PROCEDURE — 37000009 HC ANESTHESIA EA ADD 15 MINS: Performed by: SURGERY

## 2020-11-18 PROCEDURE — 71000015 HC POSTOP RECOV 1ST HR: Performed by: SURGERY

## 2020-11-18 PROCEDURE — 36000706: Performed by: SURGERY

## 2020-11-18 PROCEDURE — 64450 SAPHENOUS NERVE SINGLE INJECTION: ICD-10-PCS | Mod: 59,RT,, | Performed by: ANESTHESIOLOGY

## 2020-11-18 PROCEDURE — 71000016 HC POSTOP RECOV ADDL HR: Performed by: SURGERY

## 2020-11-18 PROCEDURE — 64445 POPLITEAL SCIATIC SINGLE INJECTION BLOCK: ICD-10-PCS | Mod: 59,RT,, | Performed by: ANESTHESIOLOGY

## 2020-11-18 PROCEDURE — D9220A PRA ANESTHESIA: ICD-10-PCS | Mod: CRNA,,, | Performed by: NURSE ANESTHETIST, CERTIFIED REGISTERED

## 2020-11-18 PROCEDURE — 63600175 PHARM REV CODE 636 W HCPCS: Performed by: SURGERY

## 2020-11-18 PROCEDURE — 11043 PR DEBRIDEMENT, SKIN, SUB-Q TISSUE,MUSCLE,=<20 SQ CM: ICD-10-PCS | Mod: ,,, | Performed by: SURGERY

## 2020-11-18 PROCEDURE — 63600175 PHARM REV CODE 636 W HCPCS: Performed by: NURSE ANESTHETIST, CERTIFIED REGISTERED

## 2020-11-18 RX ORDER — BUPIVACAINE HYDROCHLORIDE 5 MG/ML
INJECTION, SOLUTION EPIDURAL; INTRACAUDAL
Status: COMPLETED | OUTPATIENT
Start: 2020-11-18 | End: 2020-11-18

## 2020-11-18 RX ORDER — OXYCODONE HYDROCHLORIDE 5 MG/1
5 TABLET ORAL EVERY 6 HOURS PRN
Qty: 20 TABLET | Refills: 0 | Status: SHIPPED | OUTPATIENT
Start: 2020-11-18 | End: 2020-12-16 | Stop reason: CLARIF

## 2020-11-18 RX ORDER — FENTANYL CITRATE 50 UG/ML
25 INJECTION, SOLUTION INTRAMUSCULAR; INTRAVENOUS EVERY 5 MIN PRN
Status: DISCONTINUED | OUTPATIENT
Start: 2020-11-18 | End: 2020-11-18 | Stop reason: HOSPADM

## 2020-11-18 RX ORDER — MIDAZOLAM HYDROCHLORIDE 1 MG/ML
0.5 INJECTION INTRAMUSCULAR; INTRAVENOUS
Status: DISCONTINUED | OUTPATIENT
Start: 2020-11-18 | End: 2020-11-18 | Stop reason: HOSPADM

## 2020-11-18 RX ORDER — FENTANYL CITRATE 50 UG/ML
25 INJECTION, SOLUTION INTRAMUSCULAR; INTRAVENOUS EVERY 5 MIN PRN
Status: CANCELLED | OUTPATIENT
Start: 2020-11-18

## 2020-11-18 RX ORDER — MIDAZOLAM HYDROCHLORIDE 1 MG/ML
INJECTION, SOLUTION INTRAMUSCULAR; INTRAVENOUS
Status: DISCONTINUED | OUTPATIENT
Start: 2020-11-18 | End: 2020-11-18

## 2020-11-18 RX ORDER — SODIUM CHLORIDE 9 MG/ML
INJECTION, SOLUTION INTRAVENOUS CONTINUOUS PRN
Status: DISCONTINUED | OUTPATIENT
Start: 2020-11-18 | End: 2020-11-18

## 2020-11-18 RX ORDER — OXYCODONE HYDROCHLORIDE 5 MG/1
5 TABLET ORAL EVERY 6 HOURS PRN
Qty: 16 TABLET | Refills: 0 | Status: SHIPPED | OUTPATIENT
Start: 2020-11-18 | End: 2020-12-16 | Stop reason: CLARIF

## 2020-11-18 RX ORDER — FENTANYL CITRATE 50 UG/ML
INJECTION, SOLUTION INTRAMUSCULAR; INTRAVENOUS
Status: DISCONTINUED | OUTPATIENT
Start: 2020-11-18 | End: 2020-11-18

## 2020-11-18 RX ORDER — ONDANSETRON 2 MG/ML
4 INJECTION INTRAMUSCULAR; INTRAVENOUS ONCE AS NEEDED
Status: CANCELLED | OUTPATIENT
Start: 2020-11-18 | End: 2032-04-16

## 2020-11-18 RX ORDER — CLINDAMYCIN PHOSPHATE 900 MG/50ML
900 INJECTION, SOLUTION INTRAVENOUS
Status: DISCONTINUED | OUTPATIENT
Start: 2020-11-18 | End: 2020-11-18 | Stop reason: HOSPADM

## 2020-11-18 RX ADMIN — BUPIVACAINE HYDROCHLORIDE 15 ML: 5 INJECTION, SOLUTION EPIDURAL; INTRACAUDAL; PERINEURAL at 12:11

## 2020-11-18 RX ADMIN — MIDAZOLAM HYDROCHLORIDE 1 MG: 1 INJECTION, SOLUTION INTRAMUSCULAR; INTRAVENOUS at 01:11

## 2020-11-18 RX ADMIN — CLINDAMYCIN IN 5 PERCENT DEXTROSE 900 MG: 18 INJECTION, SOLUTION INTRAVENOUS at 01:11

## 2020-11-18 RX ADMIN — SODIUM CHLORIDE: 0.9 INJECTION, SOLUTION INTRAVENOUS at 12:11

## 2020-11-18 RX ADMIN — BUPIVACAINE HYDROCHLORIDE 30 ML: 5 INJECTION, SOLUTION EPIDURAL; INTRACAUDAL; PERINEURAL at 01:11

## 2020-11-18 RX ADMIN — FENTANYL CITRATE 50 MCG: 50 INJECTION, SOLUTION INTRAMUSCULAR; INTRAVENOUS at 12:11

## 2020-11-18 RX ADMIN — FENTANYL CITRATE 25 MCG: 50 INJECTION, SOLUTION INTRAMUSCULAR; INTRAVENOUS at 01:11

## 2020-11-18 NOTE — OP NOTE
[facility]  Operative Note     Surgery Date: 11/18/2020      Surgeon(s) and Role:     * Mitch Chan MD - Primary     Assisting Surgeon:   1. Young Apodaca MD  2. Kendall Nieto MD     Pre-op Diagnosis:   1. Atherosclerosis of native artery of right lower extremity with ulceration of ankle [I70.233]  2.           Patient Active Problem List     Diagnosis Date Noted    Atherosclerosis of native artery of right lower extremity with ulceration of ankle 11/09/2020    Statin intolerance 10/27/2020    Abnormal EKG 10/27/2020    Type 2 diabetes mellitus with right diabetic foot ulcer 09/29/2020    Diabetes mellitus due to underlying condition with foot ulcer, without long-term current use of insulin      Diabetic ulcer of toe of right foot associated with type 2 diabetes mellitus, with bone involvement without evidence of necrosis 08/11/2020    Diabetic ulcer of right lower leg 06/18/2020    Status post abdominal paracentesis 05/08/2020    Ischemic cardiomyopathy 01/20/2020    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 10/02/2019    CKD stage 3 due to type 2 diabetes mellitus 05/01/2019    Anemia due to multiple mechanisms 05/01/2019    Persistent proteinuria 05/01/2019    Atherosclerosis of left lower extremity with ulceration of midfoot 04/30/2019    Stage 3 chronic kidney disease 03/11/2019    Subacute osteomyelitis of left foot 01/14/2019    Debility 12/25/2018    MDR Acinetobacter baumannii infection 12/20/2018    Other ascites 12/12/2018    Type 2 diabetes mellitus with left diabetic foot ulcer 10/29/2018    Hepatosplenomegaly 10/12/2018    Right wrist pain 10/11/2018    PVD (peripheral vascular disease) 10/10/2018    Neovascular glaucoma, right eye, mild stage 10/09/2018    Statin myopathy 07/27/2018    Neovascular glaucoma of left eye, severe stage 02/15/2017    Coronary artery disease involving native coronary artery of native heart without angina  pectoris 05/31/2016    Essential hypertension 05/06/2016    Tophaceous gout 10/22/2015    Neovascular glaucoma of both eyes 03/20/2015    DM type 2 with diabetic peripheral neuropathy 10/22/2014    Mixed hyperlipidemia 10/22/2014    Nuclear sclerotic cataract of right eye 08/28/2014    Pseudophakia, left eye 08/28/2014    Ptosis, left eyelid 08/28/2014    Epiretinal membrane, both eyes 08/22/2014            Post-op Diagnosis:  Post-Op Diagnosis Codes:     * Atherosclerosis of native artery of right lower extremity with ulceration of ankle [I70.233]     Procedure(s) (LRB):  1. Right leg wound, anterior lower limb, debridement  2. Right ankle wound debridement  3. Right heel wound debridement  4. Right foot wound washout     Procedure in detail:  The patient was seen in preop holding and was deemed stable by anesthesia.  The received a right ankle block for regional anesthesia.  Patient's vital signs are stable.  From the operating and placed supine on operating room table.  His right lower extremity was prepped and draped in sterile fashion.  Time-out performed.  A scalpel was used to debride the right lower leg wound down to subcutaneous tissue and removed all poorly healing tissue.  Next a forecep was used to debride further as well as the right ankle wound down to level of muscle.  Next our attention was turned to the right heel wound.  Scalpel was used to remove the necrotic muscle subcutaneous fat.  No deep infection or abscess was noted.  The wound bled well.  There was no bone exposure.The skin edges were debrided and noted to bleed well.  The subcutaneous tissue also had moderate bleeding.  The lateral foot wound was also debrided with a scalpel down to the level of the skin.     Anesthesia: Regional     Description of the findings of the procedure(s): no deep infection, no bone exposure; good bleeding to wound     Estimated Blood Loss: < 5cc         Specimens:         Specimen (12h ago, onward)      None

## 2020-11-18 NOTE — DISCHARGE INSTRUCTIONS
Discharge Instructions: Caring for Your Wound  Taking proper care of your wound will help it heal. Your healthcare provider or nurse may show you specifically how to clean and dress the wound and how to tell if the wound is healing normally. This sheet will help you remember those guidelines when you are at home.  Getting ready  · Put pets and children in another room, away from your work area.  · Wash your hands before touching any of your supplies:  ¨ Turn on the water.  ¨ Wet your hands and wrists.  ¨ Use liquid soap from a pump dispenser. Work up a lather.  ¨ Scrub your hands thoroughly.  ¨ Rinse your hands with your fingers pointing toward the drain.  ¨ Dry your hands with a clean cloth or paper towel. Use this towel to turn off the faucet.  ¨ Remember, once you have washed your hands, dont touch anything other than your supplies. Wash your hands again if you touch anything, like furniture or your clothes.  · Clean your work area:  ¨ Clean washable surfaces with soap and water, and dry with a clean cloth or paper towel.  ¨ Wipe surfaces that are not washable (like fabric or wood) so that they are free of dust. Spread a clean cloth or paper towel over your work surface.  ¨ Move away from the clean surface, if you need to cough or sneeze.  · Gather your bandage supplies:  ¨ Gauze dressing or other bandage material  ¨ Medical tape  ¨ Disposable gloves  · Wash your hands again.   Dressing the wound  · Remove the old dressing:  ¨ Put on disposable gloves if youre dressing a wound for someone else or if your wound is infected.  ¨ Pull gently toward the wound to loosen the tape.  ¨ One layer at a time, gently remove the dressing.  ¨ If you have a drain or tube, be careful not to pull on it.  ¨ Look at the dressing. Make sure that you are seeing a decreasing amount of blood, and that the blood is turning into a clear, swati fluid.  ¨ If your wound has stitches, look for loose ones.  ¨ Put the dressing in a plastic  bag. Then remove your gloves.  · Inspect the wound. Look for signs that it isn't healing normally. A wound that isnt healing properly may be dark in color or have white streaks.  · Dress the wound:  ¨ Wash your hands again.  ¨ Clean and dress the wound as you were shown by your healthcare provider or nurse.  ¨ If youre dressing a wound for someone else or if your wound is infected, put on a new pair of disposable gloves .  ¨ If you have a drain or tube, be careful not to pull on it.  · Discard any used materials or trash in a plastic bag before placing in a trash can.  Follow-up  Make a follow-up appointment as directed by our staff.  When to call your healthcare provider  Call your healthcare provider right away if you have any of the following:  · Shaking chills or fever above 100.4°F (38°C)  · Bleeding that soaks the dressing  · Stitches that are pulling away from the wound or pulling apart  · Pink fluid weeping from the wound  · Increased drainage from the wound or drainage that is yellow, yellow-green, or smelly  · Increased swelling, pain, or redness in the skin around the wound  · A change in the color or size of the wound  · Increased fatigue  · Loss of appetite   Date Last Reviewed: 8/5/2015  © 0372-1332 [employer] [address] [place] [address], [place] [address]. All rights reserved. This information is not intended as a substitute for professional medical care. Always follow your healthcare professional's instructions.

## 2020-11-18 NOTE — ANESTHESIA PROCEDURE NOTES
Popliteal Sciatic Single Injection Block    Patient location during procedure: pre-op   Block not for primary anesthetic.  Reason for block: at surgeon's request and post-op pain management   Post-op Pain Location: right lower extremity   Start time: 11/18/2020 12:46 PM  Timeout: 11/18/2020 12:45 PM   End time: 11/18/2020 12:55 PM    Staffing  Authorizing Provider: Jameel Guo MD  Performing Provider: Ismael Anne MD    Preanesthetic Checklist  Completed: patient identified, site marked, surgical consent, pre-op evaluation, timeout performed, IV checked, risks and benefits discussed and monitors and equipment checked  Peripheral Block  Patient position: supine  Prep: ChloraPrep  Patient monitoring: heart rate, cardiac monitor, continuous pulse ox, continuous capnometry and frequent blood pressure checks  Block type: popliteal  Laterality: right  Injection technique: single shot  Needle  Needle type: Stimuplex   Needle gauge: 21 G  Needle length: 4 in  Needle localization: anatomical landmarks and ultrasound guidance   -ultrasound image captured on disc.  Assessment  Injection assessment: negative aspiration, negative parasthesia and local visualized surrounding nerve  Paresthesia pain: none  Heart rate change: no  Slow fractionated injection: yes  Additional Notes  VSS.  [facility] RN monitoring vitals throughout procedure.  Patient tolerated procedure well. 30 cc of 0.5% bupivacaine with additives

## 2020-11-18 NOTE — ANESTHESIA PROCEDURE NOTES
Saphenous Nerve Single Injection    Patient location during procedure: pre-op   Block not for primary anesthetic.  Reason for block: at surgeon's request and post-op pain management   Post-op Pain Location: Right  Start time: 11/18/2020 12:46 PM  Timeout: 11/18/2020 12:45 PM   End time: 11/18/2020 12:52 PM    Staffing  Authorizing Provider: Jameel Guo MD  Performing Provider: Ismael Anne MD    Preanesthetic Checklist  Completed: patient identified, site marked, surgical consent, pre-op evaluation, timeout performed, IV checked, risks and benefits discussed and monitors and equipment checked  Peripheral Block  Patient position: supine  Prep: ChloraPrep  Patient monitoring: heart rate, cardiac monitor, continuous pulse ox, continuous capnometry and frequent blood pressure checks  Block type: saphenous  Laterality: right  Injection technique: single shot  Needle  Needle type: Stimuplex   Needle gauge: 21 G  Needle length: 4 in  Needle localization: anatomical landmarks and ultrasound guidance   -ultrasound image captured on disc.  Assessment  Injection assessment: negative aspiration, negative parasthesia and local visualized surrounding nerve  Paresthesia pain: none  Heart rate change: no  Slow fractionated injection: yes  Additional Notes  VSS.  [facility] RN monitoring vitals throughout procedure.  Patient tolerated procedure well. 15 cc of 0.5% bupivacaine with additives

## 2020-11-18 NOTE — BRIEF OP NOTE
[facility]  Brief Operative Note    Surgery Date: 11/18/2020     Surgeon(s) and Role:     * Mitch Chan MD - Primary     * Kendall Nieto MD - Resident - Assisting     * Reji Apodaca MD - Fellow    Pre-op Diagnosis:  Atherosclerosis of native artery of right lower extremity with ulceration of ankle [I70.233]    Post-op Diagnosis:  Post-Op Diagnosis Codes:     * Atherosclerosis of native artery of right lower extremity with ulceration of ankle [I70.233]    Procedure(s) (LRB):  Debridement and washout right lower extremity wounds (Right)    Anesthesia: Regional    Description of the findings of the procedure(s): necrotic fat and muscle, good bleeding    Estimated Blood Loss: <5 cc  Specimens:   Specimen (12h ago, onward)    None            Discharge Note    OUTCOME: Patient tolerated treatment/procedure well without complication and is now ready for discharge.    DISPOSITION: Home or Self Care    FINAL DIAGNOSIS:  <principal problem not specified>    FOLLOWUP: In clinic    DISCHARGE INSTRUCTIONS:  No discharge procedures on file.

## 2020-11-18 NOTE — TRANSFER OF CARE
"Anesthesia Transfer of Care Note    Patient: Marvin Ray    Procedure(s) Performed: Procedure(s) (LRB):  Debridement and washout right lower extremity wounds (Right)    Patient location: [facility]    Anesthesia Type: general    Transport from OR: Transported from OR on room air with adequate spontaneous ventilation    Post pain: adequate analgesia    Post assessment: no apparent anesthetic complications and tolerated procedure well    Post vital signs: stable    Level of consciousness: alert, oriented and awake    Nausea/Vomiting: no nausea/vomiting    Complications: none    Transfer of care protocol was followed      Last vitals:   Visit Vitals  /64 (BP Location: Left arm, Patient Position: Lying)   Pulse 65   Temp 36.7 °C (98.1 °F) (Oral)   Resp 12   Ht 5' 10" (1.778 m)   Wt 90.7 kg (200 lb)   SpO2 97%   BMI 28.70 kg/m²     "

## 2020-11-18 NOTE — ANESTHESIA PREPROCEDURE EVALUATION
11/18/2020  Marvin Ray is a 60 y.o., male.  Pre-operative evaluation for Debridement and washout right lower extremity wounds (Right)    Chief Complaint: foot ulcer    PMH:  diabetes for almost 30 years with complications like retinopathy, neuropathy, severe PVD with multiple toes amputated, non healing leg wound/ ulcers, multiple prior angiography procedures  HTN  CAD/ICM, nonoperable per [facility] note 5/2016 - cardiomyopathy, [facility] AICD placement, EF 20%:  PAD  CVI  CKD   osteomyelitis, prior multiple rounds of different antibiotics  chronic ascites, gout and multiple other medical problems.   Wheel chair bound  Past Surgical History:   Procedure Laterality Date    AHMED GLAUCOMA IMPLANT Left 2011    DONE AT [facility]    AORTOGRAPHY WITH SERIALOGRAPHY Left 4/30/2019    Procedure: AORTOGRAM, WITH SERIALOGRAPHY, LEFT LOWER EXTREMITY, POSSIBLE INTERVENTION;  Surgeon: Mitch Chan MD;  Location: [facility] OR;  Service: Vascular;  Laterality: Left;  RN PRE OP 4-23-19.  NO H & P, No Cosent  [place]    AORTOGRAPHY WITH SERIALOGRAPHY Right 10/6/2020    Procedure: AORTOGRAM, WITH SERIALOGRAPHY, RIGHT LOWER EXTREMITY ANGIOGRAM, POSSIBLE INTERVENTION;  Surgeon: Mitch Chan MD;  Location: [facility] OR;  Service: Vascular;  Laterality: Right;  RN PREOP 10/1/2020--COVID NEGATIVE ON 10/5    BAERVELDT GLAUCOMA IMPLANT Left 2012    WITH CATARACT EXTRACTION//DONE AT [facility]    CARDIAC CATHETERIZATION Left 05/2016    CARDIAC DEFIBRILLATOR PLACEMENT Left 11/02/2018    CATARACT EXTRACTION W/  INTRAOCULAR LENS IMPLANT Left 2012    WITH BAERVEDT//DONE AT [facility]    CATARACT EXTRACTION W/  INTRAOCULAR LENS IMPLANT Right 09/26/2018    COMPLEX ()    CB DESTRUCTION WITH CYCLO G6 Left 02/15/2017        CYST REMOVAL      DEBRIDEMENT OF FOOT  12/28/2018    Procedure: DEBRIDEMENT, FOOT;   Surgeon: Mitch Wall MD;  Location: [facility] OR;  Service: Vascular;;    DEBRIDEMENT OF FOOT  6/5/2019    Procedure: DEBRIDEMENT, FOOT;  Surgeon: Mitch Wall MD;  Location: [facility] OR;  Service: Vascular;;    EXAMINATION UNDER ANESTHESIA Left 12/5/2018    Procedure: Exam under anesthesia, left foot debridement, washout and all other indicated procedures;  Surgeon: Mitch Wall MD;  Location: [facility] OR;  Service: Vascular;  Laterality: Left;  1030AM START  RN PREOP 12/3/2018-----AICD  SEEN BY DR JAMES 12/4    EXAMINATION UNDER ANESTHESIA Left 2/13/2019    Procedure: LEFT FOOT WOUND DEBRIDEMENT, WASHOUT AND ALL OTHER INDICATED PROCEDURES;  Surgeon: Mitch Wall MD;  Location: [facility] OR;  Service: Vascular;  Laterality: Left;  requesting 1st case start  THIS CASE 1ST PER DR WALL ON 2/1/19 @ 844AM -[place]  RN PREOP 2/6/2019  HAS CARDIAC CLEARANCE    EXAMINATION UNDER ANESTHESIA Left 12/28/2018    Procedure: Exam under anesthesia, left foot debridement, left foot washout, possible left second through fifth metatarsal resection;  Surgeon: Mitch Wall MD;  Location: [facility] OR;  Service: Vascular;  Laterality: Left;  1:00pm start per julissa @ 8:58am  RN  PHONE PRE OP 12-27-18--=[facility]  NEED CONSENT    EXAMINATION UNDER ANESTHESIA Left 6/5/2019    Procedure: LEFT FOOT DEBRIDEMENT, WASHOUT AND ALL OTHER INDICATED PROCEDURES;  Surgeon: Mitch Wall MD;  Location: [facility] OR;  Service: Vascular;  Laterality: Left;  RN PRE OP 5-31-19------ICD------NEED CONSENT    EXAMINATION UNDER ANESTHESIA Right 11/9/2020    Procedure: RIGHT FOOT DEBRIDEMENT, WASHOUT AND ALL OTHER INDICATED PROCEDURES;  Surgeon: Mitch Wall MD;  Location: [facility] OR;  Service: Vascular;  Laterality: Right;  RN PREOP 10/27/2020, --COVID NEGATIVE ON 11/6, has cardiac clearance/[place] 10/27/2020, pt has ICD  NEEDS ORDERS    INCISION AND DRAINAGE Left 11/14/2018    Procedure: Incision and  Drainage, left lower extremity debridement, washout;  Surgeon: Mitch Chan MD;  Location: [facility] OR;  Service: Vascular;  Laterality: Left;    INCISION AND DRAINAGE Left 11/16/2018    Procedure: INCISION AND DRAINAGE;  Surgeon: Mitch Chan MD;  Location: [facility] OR;  Service: Vascular;  Laterality: Left;    INTRAOCULAR PROSTHESES INSERTION Right 9/26/2018    Procedure: INSERTION, IOL PROSTHESIS;  Surgeon: Perla Cortés MD;  Location: [facility] OR [address] [place] [address];  Service: Ophthalmology;  Laterality: Right;    PERITONEOCENTESIS N/A 3/1/2019    Procedure: PARACENTESIS, ABDOMINAL, INITIAL;  Surgeon: Dosc Diagnostic Provider;  Location: [facility] OR;  Service: Radiology;  Laterality: N/A;    PERITONEOCENTESIS N/A 3/25/2019    Procedure: PARACENTESIS, ABDOMINAL, INITIAL;  Surgeon: Dosc Diagnostic Provider;  Location: [facility] OR;  Service: Radiology;  Laterality: N/A;    PERITONEOCENTESIS N/A 7/22/2019    Procedure: PARACENTESIS, ABDOMINAL, INITIAL;  Surgeon: Dosc Diagnostic Provider;  Location: [facility] OR;  Service: Radiology;  Laterality: N/A;    PERITONEOCENTESIS N/A 8/16/2019    Procedure: PARACENTESIS, ABDOMINAL, INITIAL;  Surgeon: Dosc Diagnostic Provider;  Location: [facility] OR;  Service: Radiology;  Laterality: N/A;    PERITONEOCENTESIS N/A 9/26/2019    Procedure: PARACENTESIS, ABDOMINAL;  Surgeon: Dosc Diagnostic Provider;  Location: [facility] OR;  Service: Radiology;  Laterality: N/A;    PERITONEOCENTESIS N/A 10/11/2019    Procedure: PARACENTESIS, ABDOMINAL;  Surgeon: Dosc Diagnostic Provider;  Location: [facility] OR;  Service: Radiology;  Laterality: N/A;    PERITONEOCENTESIS N/A 10/31/2019    Procedure: PARACENTESIS, ABDOMINAL;  Surgeon: Dosc Diagnostic Provider;  Location: [facility] OR;  Service: Radiology;  Laterality: N/A;    PERITONEOCENTESIS N/A 11/18/2019    Procedure: PARACENTESIS, ABDOMINAL;  Surgeon: Dosc Diagnostic Provider;  Location: [facility] OR;  Service: Radiology;  Laterality: N/A;    PERITONEOCENTESIS N/A  12/16/2019    Procedure: PARACENTESIS, ABDOMINAL;  Surgeon: Dosc Diagnostic Provider;  Location: [facility] OR;  Service: Radiology;  Laterality: N/A;  2PM START    PERITONEOCENTESIS N/A 2/7/2020    Procedure: PARACENTESIS, ABDOMINAL;  Surgeon: Dosc Diagnostic Provider;  Location: [facility] OR;  Service: Radiology;  Laterality: N/A;  REQUESTED 10:30A START    PERITONEOCENTESIS N/A 2/19/2020    Procedure: PARACENTESIS, ABDOMINAL;  Surgeon: Dosc Diagnostic Provider;  Location: [facility] OR;  Service: Radiology;  Laterality: N/A;    PERITONEOCENTESIS N/A 2/28/2020    Procedure: PARACENTESIS, ABDOMINAL;  Surgeon: Dosc Diagnostic Provider;  Location: [facility] OR;  Service: Radiology;  Laterality: N/A;  REQUESTED 10AM START    PHACOEMULSIFICATION OF CATARACT Right 9/26/2018    Procedure: PHACOEMULSIFICATION, CATARACT;  Surgeon: Perla Cortés MD;  Location: [facility] OR [address] [place] [address];  Service: Ophthalmology;  Laterality: Right;    REPEAT ABDOMINAL PARACENTESIS N/A 2/11/2019    Procedure: PARACENTESIS, ABDOMINAL, REPEAT;  Surgeon: Dosc Diagnostic Provider;  Location: [facility] OR;  Service: Radiology;  Laterality: N/A;  1PM START    REPEAT ABDOMINAL PARACENTESIS N/A 9/12/2019    Procedure: PARACENTESIS, ABDOMINAL, REPEAT;  Surgeon: Dosc Diagnostic Provider;  Location: [facility] OR;  Service: Radiology;  Laterality: N/A;  9AM START    REPEAT ABDOMINAL PARACENTESIS N/A 12/2/2019    Procedure: PARACENTESIS, ABDOMINAL, REPEAT;  Surgeon: Dosc Diagnostic Provider;  Location: [facility] OR;  Service: Radiology;  Laterality: N/A;  3PM START    REPEAT ABDOMINAL PARACENTESIS N/A 1/10/2020    Procedure: PARACENTESIS, ABDOMINAL, REPEAT;  Surgeon: Dosc Diagnostic Provider;  Location: [facility] OR;  Service: Radiology;  Laterality: N/A;  1130AM START    REPEAT ABDOMINAL PARACENTESIS N/A 1/20/2020    Procedure: PARACENTESIS, ABDOMINAL, REPEAT;  Surgeon: Dosc Diagnostic Provider;  Location: [facility] OR;  Service: Radiology;  Laterality: N/A;  1PM START    REPEAT ABDOMINAL  PARACENTESIS N/A 1/31/2020    Procedure: PARACENTESIS, ABDOMINAL, REPEAT;  Surgeon: Dosc Diagnostic Provider;  Location: [facility] OR;  Service: Radiology;  Laterality: N/A;  10AM START    REPEAT ABDOMINAL PARACENTESIS N/A 3/16/2020    Procedure: PARACENTESIS, ABDOMINAL, REPEAT;  Surgeon: Dosc Diagnostic Provider;  Location: [facility] OR;  Service: Radiology;  Laterality: N/A;  10AM START    REPEAT ABDOMINAL PARACENTESIS N/A 3/30/2020    Procedure: PARACENTESIS, ABDOMINAL, REPEAT;  Surgeon: Dosc Diagnostic Provider;  Location: [facility] OR;  Service: Radiology;  Laterality: N/A;    REPEAT ABDOMINAL PARACENTESIS N/A 4/9/2020    Procedure: PARACENTESIS, ABDOMINAL, REPEAT;  Surgeon: Dosc Diagnostic Provider;  Location: [facility] OR;  Service: Radiology;  Laterality: N/A;  1130AM START    REPEAT ABDOMINAL PARACENTESIS N/A 5/8/2020    Procedure: PARACENTESIS, ABDOMINAL, REPEAT;  Surgeon: Dosc Diagnostic Provider;  Location: [facility] OR;  Service: Radiology;  Laterality: N/A;  9AM START    REPEAT ABDOMINAL PARACENTESIS N/A 5/21/2020    Procedure: PARACENTESIS, ABDOMINAL, REPEAT;  Surgeon: Dosc Diagnostic Provider;  [facility];  Service: Radiology;  Laterality: N/A;  10AM START    REPEAT ABDOMINAL PARACENTESIS N/A 6/5/2020    Procedure: PARACENTESIS, ABDOMINAL, REPEAT;  Surgeon: Dosc Diagnostic Provider;  Location: [facility] OR;  Service: Radiology;  Laterality: N/A;  NOON START    REPEAT ABDOMINAL PARACENTESIS N/A 7/10/2020    Procedure: PARACENTESIS, ABDOMINAL, REPEAT;  Surgeon: Dosc Diagnostic Provider;  Location: [facility] OR;  Service: Radiology;  Laterality: N/A;  1PM START    REPEAT ABDOMINAL PARACENTESIS N/A 8/20/2020    Procedure: PARACENTESIS, ABDOMINAL, REPEAT;  Surgeon: Dosc Diagnostic Provider;  [facility];  Service: Radiology;  Laterality: N/A;  1PM START    REPEAT ABDOMINAL PARACENTESIS N/A 9/4/2020    Procedure: PARACENTESIS, ABDOMINAL, REPEAT;  Surgeon: Dosc Diagnostic Provider;  Location: [facility];  Service:  Radiology;  Laterality: N/A;  11 AM START    REPEAT ABDOMINAL PARACENTESIS N/A 2020    Procedure: PARACENTESIS, ABDOMINAL, REPEAT;  Surgeon: [facility] Diagnostic Provider;  Location: [facility] OR;  Service: Radiology;  Laterality: N/A;  START 2:00 P.M.    REPEAT ABDOMINAL PARACENTESIS N/A 2020    Procedure: PARACENTESIS, ABDOMINAL, REPEAT;  Surgeon: [facility] Diagnostic Provider;  Location: [facility] OR;  Service: Radiology;  Laterality: N/A;  1 P.M. START    REPEAT ABDOMINAL PARACENTESIS N/A 11/3/2020    Procedure: PARACENTESIS, ABDOMINAL, REPEAT;  Surgeon: [facility] Diagnostic Provider;  Location: [facility] OR;  Service: Radiology;  Laterality: N/A;  11AM START    REPEAT ABDOMINAL PARACENTESIS N/A 2020    Procedure: PARACENTESIS, ABDOMINAL, REPEAT;  Surgeon: [facility] Diagnostic Provider;  Location: [facility] OR;  Service: Radiology;  Laterality: N/A;  12PM START    REVISION OF PROCEDURE INVOLVING GLAUCOMA DRAINAGE DEVICE Left 10/2/2019    EXTRUDING BAERVELDT /WITH PERICARDIAL PATCH GRAFT ()    Right foot surgery  10/2014    TOE AMPUTATION Right     first and second    TOE AMPUTATION Left 2018    Procedure: AMPUTATION, TOES  2-5;  Surgeon: Mitch Chan MD;  Location: [facility] OR;  Service: Vascular;  Laterality: Left;    TOE AMPUTATION Left 2018    Procedure: AMPUTATION, TOE;  Surgeon: Mitch Chan MD;  Location: [facility] OR;  Service: Vascular;  Laterality: Left;  left great toe    TONSILLECTOMY           Vital Signs Range (Last 24H):         CBC:     Recent Labs   Lab 10/27/20  1000   WBC 6.66   RBC 4.00*   HGB 11.5*   HCT 36.0*      MCV 90   MCH 28.8   MCHC 31.9*       CMP:   Recent Labs   Lab 10/27/20  1000      K 4.6      CO2 29   BUN 59*   CREATININE 1.5*   GLU 87   CALCIUM 8.9   ALBUMIN 2.7*   PROT 7.3   ALKPHOS 149*   ALT 12   AST 15   BILITOT 0.6       INR:  No results for input(s): PT, INR, PROTIME, APTT in the last 720 hours.      Diagnostic Studies:      EKD  Echo:  Anesthesia Evaluation    I have reviewed the Patient Summary Reports.     I have reviewed the Nursing Notes. I have reviewed the NPO Status.   I have reviewed the Medications.     Review of Systems  Anesthesia Hx:  No problems with previous Anesthesia    Social:  Non-Smoker, No Alcohol Use    Pulmonary:  Pulmonary Normal        Physical Exam  General:  Obesity    Airway/Jaw/Neck:  Airway Findings: Mouth Opening: Normal Tongue: Normal  Mallampati: III  TM Distance: Normal, at least 6 cm  Jaw/Neck Findings:  Neck ROM: Normal ROM      Dental:  Dental Findings: Periodontal disease, Severe    Chest/Lungs:  Chest/Lungs Findings: Clear to auscultation, Normal Respiratory Rate     Heart/Vascular:  Heart Findings:    Abdomen:  Abdomen Findings:  Ascites       Mental Status:  Mental Status Findings:  Alert and Oriented, Cooperative         Anesthesia Plan  Type of Anesthesia, risks & benefits discussed:  Anesthesia Type:  general, regional, MAC  Patient's Preference:   Intra-op Monitoring Plan: standard ASA monitors  Intra-op Monitoring Plan Comments:   Post Op Pain Control Plan: per primary service following discharge from PACU  Post Op Pain Control Plan Comments:   Induction:   IV  Beta Blocker:  Patient is not currently on a Beta-Blocker (No further documentation required).       Informed Consent: Patient understands risks and agrees with Anesthesia plan.  Questions answered. Anesthesia consent signed with patient.  ASA Score: 3     Day of Surgery Review of History & Physical:    H&P update referred to the surgeon.         Ready For Surgery From Anesthesia Perspective.

## 2020-11-18 NOTE — INTERVAL H&P NOTE
The patient has been examined and the H&P has been reviewed:    I concur with the findings and no changes have occurred since H&P was written.    Surgery risks, benefits and alternative options discussed and understood by patient/family.          Active Hospital Problems    Diagnosis  POA    Atherosclerosis of native artery of right leg with ulceration [I70.239]  Yes      Resolved Hospital Problems   No resolved problems to display.

## 2020-11-18 NOTE — PLAN OF CARE
Discharge material given and explained to patient and family. Both verbalized understanding. Patient taken to ride via wheelchair in stable condition with no complaints. Prescriptions delivered to bedside.

## 2020-11-19 NOTE — ANESTHESIA POSTPROCEDURE EVALUATION
Anesthesia Post Evaluation    Patient: Marvin Ray    Procedure(s) Performed: Procedure(s) (LRB):  Debridement and washout right lower extremity wounds (Right)    Final Anesthesia Type: general    Patient location during evaluation: PACU  Patient participation: Yes- Able to Participate  Level of consciousness: awake and alert and oriented  Post-procedure vital signs: reviewed and stable  Pain management: adequate  Airway patency: patent    PONV status at discharge: No PONV  Anesthetic complications: no      Cardiovascular status: hemodynamically stable  Respiratory status: unassisted, spontaneous ventilation and room air  Hydration status: euvolemic  Follow-up not needed.          Vitals Value Taken Time   /67 11/18/20 1602   Pulse 74 11/18/20 1606   Resp 19 11/18/20 1606   SpO2 97 % 11/18/20 1606   Vitals shown include unvalidated device data.      No case tracking events are documented in the log.      Pain/Ralf Score: Pain Rating Prior to Med Admin: 0 (11/18/2020 12:45 PM)  Ralf Score: 10 (11/18/2020  4:19 PM)

## 2020-11-20 ENCOUNTER — TELEPHONE (OUTPATIENT)
Dept: VASCULAR SURGERY | Facility: CLINIC | Age: 60
End: 2020-11-20

## 2020-11-20 ENCOUNTER — HOSPITAL ENCOUNTER (OUTPATIENT)
Dept: WOUND CARE | Facility: HOSPITAL | Age: 60
Discharge: HOME | End: 2020-11-20
Attending: PODIATRIST
Payer: MEDICAID

## 2020-11-20 VITALS — TEMPERATURE: 97 F | SYSTOLIC BLOOD PRESSURE: 111 MMHG | HEART RATE: 74 BPM | DIASTOLIC BLOOD PRESSURE: 64 MMHG

## 2020-11-20 DIAGNOSIS — I83.899 VENOUS STASIS ULCER WITH EDEMA OF LOWER LEG: ICD-10-CM

## 2020-11-20 DIAGNOSIS — R60.9 VENOUS STASIS ULCER WITH EDEMA OF LOWER LEG: ICD-10-CM

## 2020-11-20 DIAGNOSIS — L97.516 DIABETIC ULCER OF TOE OF RIGHT FOOT ASSOCIATED WITH TYPE 2 DIABETES MELLITUS, WITH BONE INVOLVEMENT WITHOUT EVIDENCE OF NECROSIS: ICD-10-CM

## 2020-11-20 DIAGNOSIS — L97.529 TYPE 2 DIABETES MELLITUS WITH LEFT DIABETIC FOOT ULCER: Primary | ICD-10-CM

## 2020-11-20 DIAGNOSIS — I70.239 ATHEROSCLEROSIS OF NATIVE ARTERY OF RIGHT LEG WITH ULCERATION: ICD-10-CM

## 2020-11-20 DIAGNOSIS — M86.272 SUBACUTE OSTEOMYELITIS OF LEFT FOOT: ICD-10-CM

## 2020-11-20 DIAGNOSIS — E11.622 DIABETIC ULCER OF RIGHT LOWER LEG: ICD-10-CM

## 2020-11-20 DIAGNOSIS — L97.909 VENOUS STASIS ULCER WITH EDEMA OF LOWER LEG: ICD-10-CM

## 2020-11-20 DIAGNOSIS — L97.519 TYPE 2 DIABETES MELLITUS WITH RIGHT DIABETIC FOOT ULCER: ICD-10-CM

## 2020-11-20 DIAGNOSIS — L97.919 DIABETIC ULCER OF RIGHT LOWER LEG: ICD-10-CM

## 2020-11-20 DIAGNOSIS — E11.621 TYPE 2 DIABETES MELLITUS WITH LEFT DIABETIC FOOT ULCER: Primary | ICD-10-CM

## 2020-11-20 DIAGNOSIS — I83.009 VENOUS STASIS ULCER WITH EDEMA OF LOWER LEG: ICD-10-CM

## 2020-11-20 DIAGNOSIS — E11.621 DIABETIC ULCER OF TOE OF RIGHT FOOT ASSOCIATED WITH TYPE 2 DIABETES MELLITUS, WITH BONE INVOLVEMENT WITHOUT EVIDENCE OF NECROSIS: ICD-10-CM

## 2020-11-20 DIAGNOSIS — E11.621 TYPE 2 DIABETES MELLITUS WITH RIGHT DIABETIC FOOT ULCER: ICD-10-CM

## 2020-11-20 PROCEDURE — 99214 PR OFFICE/OUTPT VISIT, EST, LEVL IV, 30-39 MIN: ICD-10-PCS | Mod: ,,, | Performed by: PODIATRIST

## 2020-11-20 PROCEDURE — 99214 OFFICE O/P EST MOD 30 MIN: CPT | Mod: ,,, | Performed by: PODIATRIST

## 2020-11-20 PROCEDURE — 99214 OFFICE O/P EST MOD 30 MIN: CPT | Performed by: PODIATRIST

## 2020-11-20 NOTE — PROGRESS NOTES
[facility]                Progress Note    Subjective:       Patient ID: Marvin Ray is a 60 y.o. male.    Chief Complaint: No chief complaint on file.    Patient post-op day # 4 from debridement of Right Foot with MD Chan. Bone exposed to Right Heel and Right 3rd Toe. Dried moderate serosanguinous drainage to Right Foot. Moist moderate serosanguinous drainage to Left foot. All wounds stable at this time. Right Lateral Foot Diabetic Ulcer appears improved as does Right Anterior Lower Leg Venous Ulcer. Endoform/Hydrofera blue to all wounds but toe wounds which have iodosorb. Reordered wound supplies for changes at home per sister starting again on Monday 11/23/2020. Swelling still present but controlled to RLE at this time. Patient still eating ok and elevating legs at home. Sister recently moved TV into patient's bedroom to facilitate more rest.       Review of Systems   Constitutional: Negative.  Negative for activity change, appetite change, chills, fatigue and fever.   HENT: Negative.    Eyes: Negative.    Respiratory: Negative.  Negative for cough and shortness of breath.    Cardiovascular: Positive for leg swelling. Negative for chest pain.   Gastrointestinal: Positive for abdominal distention. Negative for abdominal pain, anal bleeding, blood in stool, constipation, diarrhea, nausea and vomiting.   Musculoskeletal: Positive for joint swelling and myalgias.   Skin: Positive for wound.   Neurological: Positive for numbness. Negative for weakness.        + paresthesia    Psychiatric/Behavioral: Negative.          Objective:        Physical Exam  Vitals signs reviewed.   Constitutional:       Appearance: Normal appearance.   HENT:      Head: Normocephalic and atraumatic.      Nose: Nose normal.      Mouth/Throat:      Mouth: Mucous membranes are moist.      Pharynx: Oropharynx is clear.   Eyes:      Extraocular Movements: Extraocular movements intact.       Pupils: Pupils are equal, round, and reactive to light.   Neck:      Musculoskeletal: Normal range of motion and neck supple.   Cardiovascular:      Rate and Rhythm: Normal rate and regular rhythm.   Pulmonary:      Effort: Pulmonary effort is normal. No respiratory distress.      Breath sounds: No wheezing.   Abdominal:      General: There is distension.      Palpations: Abdomen is soft.      Tenderness: There is no abdominal tenderness. There is no guarding.   Musculoskeletal:      Right lower leg: Edema present.      Left lower leg: Edema present.      Comments: Fat pad atrophy to heels and met heads bilateral     Skin:     General: Skin is warm and dry.      Findings: Wound (see description) present.   Neurological:      Mental Status: He is alert and oriented to person, place, and time.      Sensory: No sensory deficit.         Vitals:    11/20/20 0817   BP: 111/64   Pulse: 74   Temp: 97.3 °F (36.3 °C)       Assessment:           ICD-10-CM ICD-9-CM   1. Type 2 diabetes mellitus with left diabetic foot ulcer  E11.621 250.80    L97.529 707.15   2. Subacute osteomyelitis of left foot  M86.272 730.07   3. Diabetic ulcer of right lower leg  E11.622 250.80    L97.919 707.10   4. Diabetic ulcer of toe of right foot associated with type 2 diabetes mellitus, with bone involvement without evidence of necrosis  E11.621 250.80    L97.516 707.15   5. Type 2 diabetes mellitus with right diabetic foot ulcer  E11.621 250.80    L97.519 707.15   6. Venous stasis ulcer with edema of lower leg  I83.009 454.0    I83.899 782.3    L97.909     R60.9    7. Atherosclerosis of native artery of right leg with ulceration  I70.239 440.23     707.9            Wound 11/02/18 Diabetic Ulcer Left medial Foot (Active)   11/02/18     Pre-existing: Yes   Primary Wound Type: Diabetic ulc   Side: Left   Orientation: medial   Location: Foot   Wound Number (optional):    Ankle-Brachial Index:    Pulses:    Removal Indication and Assessment:    Wound  Outcome:    (Retired) Wound Type:    (Retired) Wound Length (cm):    (Retired) Wound Width (cm):    (Retired) Depth (cm):    Wound Description (Comments):    Removal Indications:    Wound Image   11/20/20 0800   Wound WDL ex 11/20/20 0800   Dressing Appearance Moist drainage 11/20/20 0800   Drainage Amount Moderate 11/20/20 0800   Drainage Characteristics/Odor Serosanguineous 11/20/20 0800   Appearance Pink;Red 11/20/20 0800   Tissue loss description Full thickness 11/20/20 0800   Red (%), Wound Tissue Color 100 % 11/20/20 0800   Periwound Area Intact 11/20/20 0800   Wound Edges Open 11/20/20 0800   Wound Length (cm) 5 cm 11/20/20 0800   Wound Width (cm) 8.5 cm 11/20/20 0800   Wound Depth (cm) 0.2 cm 11/20/20 0800   Wound Volume (cm^3) 8.5 cm^3 11/20/20 0800   Wound Surface Area (cm^2) 42.5 cm^2 11/20/20 0800   Care Cleansed with:;Soap and water;Sterile normal saline 11/20/20 0800   Dressing Changed 11/20/20 0800   Periwound Care Moisture barrier applied 11/20/20 0800   Off Loading Football dressing 11/20/20 0800            Wound 05/08/20 1015 Diabetic Ulcer Left distal Foot (Active)   05/08/20 1015    Pre-existing: Yes   Primary Wound Type: Diabetic ulc   Side: Left   Orientation: distal   Location: Foot   Wound Number (optional):    Ankle-Brachial Index:    Pulses:    Removal Indication and Assessment:    Wound Outcome:    (Retired) Wound Type:    (Retired) Wound Length (cm):    (Retired) Wound Width (cm):    (Retired) Depth (cm):    Wound Description (Comments):    Removal Indications:    Wound Image   11/20/20 0800   Wound WDL ex 11/20/20 0800   Dressing Appearance Moist drainage 11/20/20 0800   Drainage Amount Moderate 11/20/20 0800   Drainage Characteristics/Odor Serosanguineous 11/20/20 0800   Appearance Pink;Red 11/20/20 0800   Tissue loss description Full thickness 11/20/20 0800   Red (%), Wound Tissue Color 100 % 11/20/20 0800   Periwound Area Intact 11/20/20 0800   Wound Edges Open 11/20/20 0800   Wound  Length (cm) 2.5 cm 11/20/20 0800   Wound Width (cm) 7.2 cm 11/20/20 0800   Wound Depth (cm) 0.2 cm 11/20/20 0800   Wound Volume (cm^3) 3.6 cm^3 11/20/20 0800   Wound Surface Area (cm^2) 18 cm^2 11/20/20 0800   Care Cleansed with:;Soap and water;Sterile normal saline 11/20/20 0800   Dressing Changed 11/20/20 0800   Off Loading Football dressing 11/20/20 0800            Wound 08/11/20 1051 Diabetic Ulcer Right medial Malleolus/Ankle (Active)   08/11/20 1051    Pre-existing: No   Primary Wound Type: Diabetic ulc   Side: Right   Orientation: medial   Location: Malleolus/Ankle   Wound Number (optional):    Ankle-Brachial Index:    Pulses:    Removal Indication and Assessment:    Wound Outcome:    (Retired) Wound Type:    (Retired) Wound Length (cm):    (Retired) Wound Width (cm):    (Retired) Depth (cm):    Wound Description (Comments):    Removal Indications:    Wound Image   11/20/20 0800   Wound WDL ex 11/20/20 0800   Dressing Appearance Dried drainage 11/20/20 0800   Drainage Amount Small 11/20/20 0800   Drainage Characteristics/Odor Serosanguineous 11/20/20 0800   Appearance Red;Pink 11/20/20 0800   Tissue loss description Full thickness 11/20/20 0800   Periwound Area Intact 11/20/20 0800   Wound Edges Open 11/20/20 0800   Wound Length (cm) 1.5 cm 11/20/20 0800   Wound Width (cm) 1.8 cm 11/20/20 0800   Wound Depth (cm) 0.15 cm 11/20/20 0800   Wound Volume (cm^3) 0.4 cm^3 11/20/20 0800   Wound Surface Area (cm^2) 2.7 cm^2 11/20/20 0800   Care Cleansed with:;Soap and water;Sterile normal saline 11/20/20 0800   Dressing Changed 11/20/20 0800   Periwound Care Moisture barrier applied 11/20/20 0800            Wound 09/29/20 1045 Other (comment) Right medial Toe, fifth (Active)   09/29/20 1045    Pre-existing:    Primary Wound Type: Other   Side: Right   Orientation: medial   Location: Toe, fifth   Wound Number (optional):    Ankle-Brachial Index:    Pulses:    Removal Indication and Assessment:    Wound Outcome:     (Retired) Wound Type:    (Retired) Wound Length (cm):    (Retired) Wound Width (cm):    (Retired) Depth (cm):    Wound Description (Comments):    Removal Indications:    Wound Image   11/20/20 0800   Wound WDL ex 11/20/20 0800   Drainage Amount Scant 11/20/20 0800   Drainage Characteristics/Odor Serous 11/20/20 0800   Appearance Pink 11/20/20 0800   Tissue loss description Full thickness 11/20/20 0800   Red (%), Wound Tissue Color 100 % 11/20/20 0800   Periwound Area Intact 11/20/20 0800   Wound Edges Open 11/20/20 0800   Wound Length (cm) 0.5 cm 11/20/20 0800   Wound Width (cm) 0.3 cm 11/20/20 0800   Wound Depth (cm) 0.15 cm 11/20/20 0800   Wound Volume (cm^3) 0.02 cm^3 11/20/20 0800   Wound Surface Area (cm^2) 0.15 cm^2 11/20/20 0800   Care Cleansed with:;Soap and water;Sterile normal saline 11/20/20 0800   Dressing Changed 11/20/20 0800   Periwound Care Moisture barrier applied 11/20/20 0800   Off Loading Football dressing 11/20/20 0800            Wound 11/10/20 1030 Diabetic Ulcer Right lateral Foot (Active)   11/10/20 1030    Pre-existing: Yes   Primary Wound Type: Diabetic ulc   Side: Right   Orientation: lateral   Location: Foot   Wound Number (optional):    Ankle-Brachial Index:    Pulses:    Removal Indication and Assessment:    Wound Outcome:    (Retired) Wound Type:    (Retired) Wound Length (cm):    (Retired) Wound Width (cm):    (Retired) Depth (cm):    Wound Description (Comments):    Removal Indications:    Wound Image   11/20/20 0800   Wound WDL ex 11/20/20 0800   Dressing Appearance Dried drainage 11/20/20 0800   Drainage Amount Moderate 11/20/20 0800   Drainage Characteristics/Odor Serosanguineous 11/20/20 0800   Appearance Eschar;Yellow 11/20/20 0800   Tissue loss description Full thickness 11/20/20 0800   Yellow (%), Wound Tissue Color 100 % 11/20/20 0800   Periwound Area Intact 11/20/20 0800   Wound Edges Defined 11/20/20 0800   Wound Length (cm) 1.5 cm 11/20/20 0800   Wound Width (cm) 6.5 cm  11/20/20 0800   Wound Depth (cm) 0.1 cm 11/20/20 0800   Wound Volume (cm^3) 0.98 cm^3 11/20/20 0800   Wound Surface Area (cm^2) 9.75 cm^2 11/20/20 0800   Care Cleansed with:;Soap and water;Sterile normal saline 11/20/20 0800   Dressing Changed 11/20/20 0800   Periwound Care Moisture barrier applied 11/20/20 0800   Off Loading Football dressing 11/20/20 0800            Incision/Site 06/16/20 0930 Right Toe, third anterior (Active)   06/16/20 0930   Present Prior to Hospital Arrival?: Yes   Side: Right   Location: Toe, third   Orientation: anterior   Incision Type:    Closure Method:    Additional Comments:    Removal Indication and Assessment:    Wound Outcome:    Removal Indications:    Wound Image   11/20/20 0800   Incision WDL ex 11/20/20 0800   Dressing Appearance Moist drainage 11/20/20 0800   Drainage Amount Small 11/20/20 0800   Drainage Characteristics/Odor Serous 11/20/20 0800   Appearance Bone;[place] 11/20/20 0800   Periwound Area Swelling 11/20/20 0800   Wound Edges Open 11/20/20 0800   Wound Length (cm) 0.4 cm 11/20/20 0800   Wound Width (cm) 0.7 cm 11/20/20 0800   Wound Depth (cm) 0.15 cm 11/20/20 0800   Wound Volume (cm^3) 0.04 cm^3 11/20/20 0800   Wound Surface Area (cm^2) 0.28 cm^2 11/20/20 0800   Care Cleansed with:;Soap and water;Sterile normal saline 11/20/20 0800   Dressing Changed 11/20/20 0800   Periwound Care Moisture barrier applied 11/20/20 0800   Off Loading Football dressing 11/20/20 0800            Incision/Site 11/09/20 1240 Right Leg (Active)   11/09/20 1240   Present Prior to Hospital Arrival?:    Side: Right   Location: Leg   Orientation:    Incision Type:    Closure Method:    Additional Comments:    Removal Indication and Assessment:    Wound Outcome:    Removal Indications:    Wound Image   11/20/20 0800   Incision WDL ex 11/20/20 0800   Dressing Appearance Dried drainage 11/20/20 0800   Drainage Amount Small 11/20/20 0800   Drainage Characteristics/Odor Serosanguineous 11/20/20 0800    Appearance Pink;Red;Granulating;Epithelialization 11/20/20 0800   Red (%), Wound Tissue Color 100 % 11/20/20 0800   Wound Edges Open 11/20/20 0800   Wound Length (cm) 4.8 cm 11/20/20 0800   Wound Width (cm) 4.2 cm 11/20/20 0800   Wound Depth (cm) 0.1 cm 11/20/20 0800   Wound Volume (cm^3) 2.02 cm^3 11/20/20 0800   Wound Surface Area (cm^2) 20.16 cm^2 11/20/20 0800   Care Cleansed with:;Soap and water;Sterile normal saline 11/20/20 0800   Dressing Changed 11/20/20 0800   Periwound Care Skin barrier film applied 11/20/20 0800            Incision/Site 11/18/20 1458 Right Foot (Active)   11/18/20 1458   Present Prior to Hospital Arrival?:    Side: Right   Location: Foot   Orientation:    Incision Type:    Closure Method:    Additional Comments:    Removal Indication and Assessment:    Wound Outcome:    Removal Indications:    Wound Image   11/20/20 0800   Incision WDL ex 11/20/20 0800   Dressing Appearance Moist drainage 11/20/20 0800   Drainage Amount Moderate 11/20/20 0800   Drainage Characteristics/Odor Serosanguineous 11/20/20 0800   Appearance Red;Yellow;Slough;Adipose;Bone 11/20/20 0800   Red (%), Wound Tissue Color 10 % 11/20/20 0800   Yellow (%), Wound Tissue Color 90 % 11/20/20 0800   Wound Edges Open 11/20/20 0800   Wound Length (cm) 9.4 cm 11/20/20 0800   Wound Width (cm) 7 cm 11/20/20 0800   Wound Depth (cm) 1.6 cm 11/20/20 0800   Wound Volume (cm^3) 105.28 cm^3 11/20/20 0800   Wound Surface Area (cm^2) 65.8 cm^2 11/20/20 0800   Care Cleansed with: 11/20/20 0800   Dressing Changed 11/20/20 0800   Off Loading Football dressing 11/20/20 0800           Plan:          Greater than 50% of this visit spent on counseling and coordination of care.    Education about the prevention of limb loss.    Discussed wound healing cycle, skin integrity, ways to care for skin.Counseled patient on the effects of PAD and high blood glucose on healing. He verbalizes understanding that it can increase the chances of delayed  "healing and this prolonged exposure leads to infection or progression of infection which subsequently can result in loss of limb.    Adequate vitamin supplementation, protein intake, and hydration - discussed with patient    The wound is cleansed of foreign material as much as possible and the base inspected for bone or abscess.     Patient is s/p debridement by vascular surgery.  New areas of granulation noted however dense fibrin remains. Debridement deferred to the right heel, I am of the belief that if I were to debride all the fibrin is tissue of the heel his calcaneus would be exposed. I believe patient will benefit from application of skin substitute.  Will attempt to acquire in [facility].  In the meantime will continue with Santyl for debridement.    Patient also has exposed bone and joint of the right 3rd digit.     Abx per ID via midline    Short-term goals include maintaining good offloading and minimizing bioburden, promoting granulation and epithelialization to healing.  Long-term goals include keeping the wound healed by good offloading and medical management under the direction of internist.        Orders Placed This Encounter   Procedures    WOUND SUPPLIES FOR HOME USE     Clean wounds with saline. Apply Endoform/Iodosrb (Right 3rd toe, Right 5th toe, Right Medial Malleolus, Right Anterior Lower Leg, Left TMA, Left Medial Malleolus) with outer dressing ABD/kerilx/medipore tape. Apply santyl/gauze/ABD/kerlix to Right Heel. Change Right Foot dressing DAILY and Left Foot dressing every 2-3 days.     Order Specific Question:   Height:     Answer:   5'10"     Order Specific Question:   Weight:     Answer:   200 lbs     Order Specific Question:   Length of need (1-99 months):     Answer:   1    Change dressing     Clean with saline. Apply endoform/hydrofera blue/mextra/cast padding x 2/stockinet. Change in 3 days per sister with endoform/iodosorb.    Change dressing     Clean with saline. Iodosorb to wound " bed. Cover with foam football. Change in 3 days per sister - reapply iodosorb.    Change dressing     Clean with saline. Apply endoform/hydrofera blue/mextra to heel/foam border to medial malleolus/cast padding x 2, stockinet. Change in 3 days per sister and restart santyl to Right Heel daily and endoform/iodosorb to right medial malleolus and right anterior lower leg.        Follow up in about 1 week (around 11/27/2020) for wound care.

## 2020-11-23 ENCOUNTER — HOSPITAL ENCOUNTER (OUTPATIENT)
Dept: INTERVENTIONAL RADIOLOGY/VASCULAR | Facility: HOSPITAL | Age: 60
Discharge: HOME | End: 2020-11-23
Attending: FAMILY MEDICINE
Payer: MEDICAID

## 2020-11-23 ENCOUNTER — HOSPITAL ENCOUNTER (OUTPATIENT)
Facility: HOSPITAL | Age: 60
Discharge: HOME | End: 2020-11-23
Attending: RADIOLOGY | Admitting: RADIOLOGY
Payer: MEDICAID

## 2020-11-23 VITALS
OXYGEN SATURATION: 98 % | SYSTOLIC BLOOD PRESSURE: 130 MMHG | HEART RATE: 74 BPM | TEMPERATURE: 98 F | DIASTOLIC BLOOD PRESSURE: 72 MMHG | RESPIRATION RATE: 18 BRPM

## 2020-11-23 VITALS — DIASTOLIC BLOOD PRESSURE: 67 MMHG | SYSTOLIC BLOOD PRESSURE: 119 MMHG

## 2020-11-23 DIAGNOSIS — R18.8 OTHER ASCITES: ICD-10-CM

## 2020-11-23 DIAGNOSIS — E78.5 HYPERLIPIDEMIA LDL GOAL <70: ICD-10-CM

## 2020-11-23 DIAGNOSIS — I10 ESSENTIAL HYPERTENSION: Chronic | ICD-10-CM

## 2020-11-23 DIAGNOSIS — I25.5 ISCHEMIC CARDIOMYOPATHY: Primary | ICD-10-CM

## 2020-11-23 PROCEDURE — 49083 IR PARACENTESIS WITH IMAGING: ICD-10-PCS | Mod: ,,, | Performed by: RADIOLOGY

## 2020-11-23 PROCEDURE — P9047 ALBUMIN (HUMAN), 25%, 50ML: HCPCS | Mod: JG | Performed by: RADIOLOGY

## 2020-11-23 PROCEDURE — 49083 ABD PARACENTESIS W/IMAGING: CPT

## 2020-11-23 PROCEDURE — 63600175 PHARM REV CODE 636 W HCPCS: Mod: JG | Performed by: RADIOLOGY

## 2020-11-23 PROCEDURE — 49083 ABD PARACENTESIS W/IMAGING: CPT | Mod: ,,, | Performed by: RADIOLOGY

## 2020-11-23 RX ORDER — HYDROCODONE BITARTRATE AND ACETAMINOPHEN 5; 325 MG/1; MG/1
1 TABLET ORAL EVERY 4 HOURS PRN
Status: DISCONTINUED | OUTPATIENT
Start: 2020-11-23 | End: 2020-11-23 | Stop reason: HOSPADM

## 2020-11-23 RX ORDER — CARVEDILOL 3.12 MG/1
TABLET ORAL
Qty: 180 TABLET | Refills: 1 | Status: SHIPPED | OUTPATIENT
Start: 2020-11-23 | End: 2021-01-01

## 2020-11-23 RX ORDER — HYDROCODONE BITARTRATE AND ACETAMINOPHEN 5; 325 MG/1; MG/1
1 TABLET ORAL EVERY 4 HOURS PRN
Status: CANCELLED | OUTPATIENT
Start: 2020-11-23

## 2020-11-23 RX ORDER — ALBUMIN HUMAN 250 G/1000ML
50 SOLUTION INTRAVENOUS ONCE AS NEEDED
Status: CANCELLED | OUTPATIENT
Start: 2020-11-23 | End: 2032-04-21

## 2020-11-23 RX ORDER — EZETIMIBE 10 MG/1
10 TABLET ORAL DAILY
Qty: 90 TABLET | Refills: 1 | Status: SHIPPED | OUTPATIENT
Start: 2020-11-23 | End: 2021-01-01

## 2020-11-23 RX ORDER — ALBUMIN HUMAN 250 G/1000ML
50 SOLUTION INTRAVENOUS ONCE AS NEEDED
Status: COMPLETED | OUTPATIENT
Start: 2020-11-23 | End: 2020-11-23

## 2020-11-23 RX ADMIN — ALBUMIN (HUMAN) 50 G: 0.25 INJECTION, SOLUTION INTRAVENOUS at 12:11

## 2020-11-23 NOTE — BRIEF OP NOTE
Radiology Post-Procedure Note     Pre Op Diagnosis: Recurrent painful, tense ascites  Post Op Diagnosis: Same     Procedure: U/S-guided therapeutic LVP     Procedure performed by: Zach Cha MD     Written Informed Consent Obtained: Yes  Specimen Removed: YES, 7600-cc of thin, straw-colored ascitic fluid  Estimated Blood Loss: none     Findings:   1. Successful therapeutic large-volume paracentesis with local anesthetic only and albumin infusion post PRN as indicated per institutional protocol. Patient tolerated the procedure well. No immediate post-procedural complications noted.      Patient transferred back to [facility] for post-op monitoring during albumin infusion prior to discharge.    Thank you for considering IR for the care of your patient.      Zach Cha MD  Interventional Radiology

## 2020-11-23 NOTE — DISCHARGE SUMMARY
Radiology Discharge Summary      Hospital Course: No complications    Admit Date: 11/23/2020  Discharge Date: 11/23/2020     Instructions Given to Patient: Yes    Diet: Resume prior diet     Activity: activity as tolerated    Description of Condition on Discharge: Stable    Vital Signs (Most Recent): BP: 119/67 (11/23/20 1105)    Discharge Disposition: Home    Discharge Diagnosis:   61 y/o M with h/o recurrent painful, tense ascites s/p successful U/S-guided therapeutic LVP with local anesthetic only and albumin infusion post PRN as indicated per institutional protocol. Patient tolerated the procedure well. No immediate post-procedural complications noted.      Thank you for considering IR for the care of your patient.      Zach Cha MD  Interventional Radiology

## 2020-11-23 NOTE — DISCHARGE INSTRUCTIONS
BATHING:  ? You may shower tomorrow.  DRESSING:  ? Remove dressing tomorrow.        ACTIVITY LEVEL: If you have received sedation or an anesthetic, you may feel sleepy for several hours. Rest until you are more awake. Gradually resume your normal activities    Do not drive, drink alcohol, or sign legal documents for 24 hours, or if taking narcotic pain medication.      DIET: You may resume your home diet. If nausea is present, increase your diet gradually with fluids and bland foods.    Medications: Pain medication should be taken only if needed and as directed. If antibiotics are prescribed, the medication should be taken until completed. You will be given an updated list of you medications.  ? No driving, alcoholic beverages or signing legal documents for next 24 hours if you have had sedation, or while taking pain medication    CALL THE DOCTOR:   For any obvious bleeding (some dried blood over the incision is normal).     Redness, swelling, foul smell around incision or fever over 101.  Shortness of breath.  Persistent pain or nausea not relieved by medication.  Call  [phone]     to speak with an Interventional Radiologist    If any unusual problems or difficulties occur contact your doctor. If you cannot contact your doctor but feel your signs and symptoms warrant a physicians attention return to the emergency room.        Discharge Instructions for Paracentesis  Paracentesis is a procedure to remove extra fluid from your belly (abdomen). This fluid buildup in the abdomen is called ascites. The procedure may have been done to take a sample of the fluid. Or, it may have been done to drain the extra fluid from your abdomen and help make you more comfortable.     Ascites is buildup of excess fluid in the abdomen.   Home care  · If you have pain after the procedure, your healthcare provider can prescribe or recommend pain medicines. Take these exactly as directed. If you stopped taking other medicines before the  procedure, ask your provider when you can start them again.  · Take it easy for 24 hours after the procedure. Avoid physical activity until your provider says its OK.  · You will have a small bandage over the puncture site. Stitches (sutures), surgical staples, adhesive tapes, adhesive strips, or surgical glue may be used to close the incision. They also help stop bleeding and speed healing. You may take the bandage off in 24 hours.  · Check the puncture site for the signs of infection listed below.  Follow-up care  Make a follow-up appointment with your healthcare provider as directed. During your follow-up visit, your provider will check your healing. Let your provider know how you are feeling. You can also discuss the cause of your ascites and if you need any further treatment.  When to seek medical advice  Call your healthcare provider if you have any of the following after the procedure:  · A fever of 100.4°F (38.0°C) or higher  · Trouble breathing  · Pain that doesn't go away even after taking pain medicine  · Belly pain not caused by having the skin punctured  · Bleeding from the puncture site  · More than a small amount of fluid leaking from the puncture site  · Swollen belly  · Signs of infection at the puncture site. These include increased pain, redness, or swelling, warmth, or bad-smelling drainage.  · Blood in your urine  · Feeling dizzy or lightheaded, or fainting   Date Last Reviewed: 7/1/2016  © 9135-0958 [employer]. [address] [place] [address]. All rights reserved. This information is not intended as a substitute for professional medical care. Always follow your healthcare professional's instructions.           Fall Prevention  Millions of people fall every year and injure themselves. You may have had anesthesia or sedation which may increase your risk of falling. You may have health issues that put you at an increased risk of falling.     Here are ways to reduce your  risk of falling.  ·   · Make your home safe by keeping walkways clear of objects you may trip over.  · Use non-slip pads under rugs. Do not use area rugs or small throw rugs.  · Use non-slip mats in bathtubs and showers.  · Install handrails and lights on staircases.  · Do not walk in poorly lit areas.  · Do not stand on chairs or wobbly ladders.  · Use caution when reaching overhead or looking upward. This position can cause a loss of balance.  · Be sure your shoes fit properly, have non-slip bottoms and are in good condition.   · Wear shoes both inside and out. Avoid going barefoot or wearing slippers.  · Be cautious when going up and down stairs, curbs, and when walking on uneven sidewalks.  · If your balance is poor, consider using a cane or walker.  · If your fall was related to alcohol use, stop or limit alcohol intake.   · If your fall was related to use of sleeping medicines, talk to your doctor about this. You may need to reduce your dosage at bedtime if you awaken during the night to go to the bathroom.    · To reduce the need for nighttime bathroom trips:  ¨ Avoid drinking fluids for several hours before going to bed  ¨ Empty your bladder before going to bed  ¨ Men can keep a urinal at the bedside  · Stay as active as you can. Balance, flexibility, strength, and endurance all come from exercise. They all play a role in preventing falls. Ask your healthcare provider which types of activity are right for you.  · Get your vision checked on a regular basis.  · If you have pets, know where they are before you stand up or walk so you don't trip over them.  Use night lights.

## 2020-11-23 NOTE — H&P
Radiology History & Physical      SUBJECTIVE:     Chief Complaint: Recurrent painful, tense ascites     History of Present Illness:  Marvin Ray is a 60 y.o. male with PMHx of CKD III and combined systolic/diastolic CHF complicated by recurrent abdominal distension and pain 2/2 recurrent painful, tense ascites without TTP on PE to suggest SBP requiring frequent  therapeutic LVP.      A new outpatient IR consult placed for US-guided therapeutic large-volume paracentesis.    Past Medical History:   Diagnosis Date    Arthritis     Cellulitis     CKD (chronic kidney disease), stage III     Coronary artery disease     Diabetes mellitus     Diabetic retinopathy     Diabetic ulcer of left foot     Glaucoma     Gout     Hyperlipemia     Hypertension     ICD (implantable cardioverter-defibrillator) in place 11/02/2018    Left chest    Non-pressure chronic ulcer of other part of left foot with fat layer exposed 10/23/2018    PVD (peripheral vascular disease)     Type 2 diabetes mellitus with left diabetic foot ulcer 10/29/2018    Unsteady gait     uses a wheelchair     Past Surgical History:   Procedure Laterality Date    AHMED GLAUCOMA IMPLANT Left 2011    DONE AT [facility]    AORTOGRAPHY WITH SERIALOGRAPHY Left 4/30/2019    Procedure: AORTOGRAM, WITH SERIALOGRAPHY, LEFT LOWER EXTREMITY, POSSIBLE INTERVENTION;  Surgeon: Mitch Chan MD;  Location: [facility] OR;  Service: Vascular;  Laterality: Left;  RN PRE OP 4-23-19.  NO H & P, No Cosent  [place]    AORTOGRAPHY WITH SERIALOGRAPHY Right 10/6/2020    Procedure: AORTOGRAM, WITH SERIALOGRAPHY, RIGHT LOWER EXTREMITY ANGIOGRAM, POSSIBLE INTERVENTION;  Surgeon: Mitch Chan MD;  Location: [facility] OR;  Service: Vascular;  Laterality: Right;  RN PREOP 10/1/2020--COVID NEGATIVE ON 10/5    BAERVELDT GLAUCOMA IMPLANT Left 2012    WITH CATARACT EXTRACTION//DONE AT [facility]    CARDIAC CATHETERIZATION Left 05/2016    CARDIAC DEFIBRILLATOR PLACEMENT Left  11/02/2018    CATARACT EXTRACTION W/  INTRAOCULAR LENS IMPLANT Left 2012    WITH BAERVEDT//DONE AT [facility]    CATARACT EXTRACTION W/  INTRAOCULAR LENS IMPLANT Right 09/26/2018    COMPLEX ()    CB DESTRUCTION WITH CYCLO G6 Left 02/15/2017        CYST REMOVAL      DEBRIDEMENT OF FOOT  12/28/2018    Procedure: DEBRIDEMENT, FOOT;  Surgeon: Mitch Wall MD;  Location: [facility] OR;  Service: Vascular;;    DEBRIDEMENT OF FOOT  6/5/2019    Procedure: DEBRIDEMENT, FOOT;  Surgeon: Mitch Wall MD;  Location: [facility] OR;  Service: Vascular;;    EXAMINATION UNDER ANESTHESIA Left 12/5/2018    Procedure: Exam under anesthesia, left foot debridement, washout and all other indicated procedures;  Surgeon: Mitch Wall MD;  Location: [facility] OR;  Service: Vascular;  Laterality: Left;  1030AM START  RN PREOP 12/3/2018-----AICD  SEEN BY DR JAMES 12/4    EXAMINATION UNDER ANESTHESIA Left 2/13/2019    Procedure: LEFT FOOT WOUND DEBRIDEMENT, WASHOUT AND ALL OTHER INDICATED PROCEDURES;  Surgeon: Mitch Wall MD;  Location: [facility] OR;  Service: Vascular;  Laterality: Left;  requesting 1st case start  THIS CASE 1ST PER DR WALL ON 2/1/19 @ 844AM -[place]  RN PREOP 2/6/2019  HAS CARDIAC CLEARANCE    EXAMINATION UNDER ANESTHESIA Left 12/28/2018    Procedure: Exam under anesthesia, left foot debridement, left foot washout, possible left second through fifth metatarsal resection;  Surgeon: Mitch Wall MD;  Location: [facility] OR;  Service: Vascular;  Laterality: Left;  1:00pm start per julissa @ 8:58am  RN  PHONE PRE OP 12-27-18--=Pt coming from [facility] on [address]  NEED CONSENT    EXAMINATION UNDER ANESTHESIA Left 6/5/2019    Procedure: LEFT FOOT DEBRIDEMENT, WASHOUT AND ALL OTHER INDICATED PROCEDURES;  Surgeon: Mitch Wall MD;  Location: [facility] OR;  Service: Vascular;  Laterality: Left;  RN PRE OP 5-31-19------ICD------NEED CONSENT    EXAMINATION UNDER ANESTHESIA Right 11/9/2020     Procedure: RIGHT FOOT DEBRIDEMENT, WASHOUT AND ALL OTHER INDICATED PROCEDURES;  Surgeon: Mitch Chan MD;  Location: [facility] OR;  Service: Vascular;  Laterality: Right;  RN PREOP 10/27/2020, --COVID NEGATIVE ON 11/6, has cardiac clearance/[place] 10/27/2020, pt has ICD  NEEDS ORDERS    INCISION AND DRAINAGE Left 11/14/2018    Procedure: Incision and Drainage, left lower extremity debridement, washout;  Surgeon: Mitch Chan MD;  Location: [facility] OR;  Service: Vascular;  Laterality: Left;    INCISION AND DRAINAGE Left 11/16/2018    Procedure: INCISION AND DRAINAGE;  Surgeon: Mitch Chan MD;  Location: [facility] OR;  Service: Vascular;  Laterality: Left;    INCISION AND DRAINAGE Right 11/18/2020    Procedure: Debridement and washout right lower extremity wounds;  Surgeon: Mitch Chan MD;  Location: [facility] OR 2ND FLR;  Service: Vascular;  Laterality: Right;    INTRAOCULAR PROSTHESES INSERTION Right 9/26/2018    Procedure: INSERTION, IOL PROSTHESIS;  Surgeon: Perla Cortés MD;  Location: [facility] OR 1ST FLR;  Service: Ophthalmology;  Laterality: Right;    PERITONEOCENTESIS N/A 3/1/2019    Procedure: PARACENTESIS, ABDOMINAL, INITIAL;  Surgeon: Dosc Diagnostic Provider;  Location: [facility] OR;  Service: Radiology;  Laterality: N/A;    PERITONEOCENTESIS N/A 3/25/2019    Procedure: PARACENTESIS, ABDOMINAL, INITIAL;  Surgeon: Dosc Diagnostic Provider;  Location: [facility] OR;  Service: Radiology;  Laterality: N/A;    PERITONEOCENTESIS N/A 7/22/2019    Procedure: PARACENTESIS, ABDOMINAL, INITIAL;  Surgeon: Dosc Diagnostic Provider;  Location: [facility] OR;  Service: Radiology;  Laterality: N/A;    PERITONEOCENTESIS N/A 8/16/2019    Procedure: PARACENTESIS, ABDOMINAL, INITIAL;  Surgeon: Dosc Diagnostic Provider;  [facility]: [facility] OR;  Service: Radiology;  Laterality: N/A;    PERITONEOCENTESIS N/A 9/26/2019    Procedure: PARACENTESIS, ABDOMINAL;  Surgeon: Dosc Diagnostic Provider;  Location: [facility] OR;  Service:  Radiology;  Laterality: N/A;    PERITONEOCENTESIS N/A 10/11/2019    Procedure: PARACENTESIS, ABDOMINAL;  Surgeon: Dosc Diagnostic Provider;  Location: [facility] OR;  Service: Radiology;  Laterality: N/A;    PERITONEOCENTESIS N/A 10/31/2019    Procedure: PARACENTESIS, ABDOMINAL;  Surgeon: Dosc Diagnostic Provider;  Location: [facility] OR;  Service: Radiology;  Laterality: N/A;    PERITONEOCENTESIS N/A 11/18/2019    Procedure: PARACENTESIS, ABDOMINAL;  Surgeon: Dosc Diagnostic Provider;  Location: [facility] OR;  Service: Radiology;  Laterality: N/A;    PERITONEOCENTESIS N/A 12/16/2019    Procedure: PARACENTESIS, ABDOMINAL;  Surgeon: Dosc Diagnostic Provider;  Location: [facility] OR;  Service: Radiology;  Laterality: N/A;  2PM START    PERITONEOCENTESIS N/A 2/7/2020    Procedure: PARACENTESIS, ABDOMINAL;  Surgeon: Dosc Diagnostic Provider;  Location: [facility] OR;  Service: Radiology;  Laterality: N/A;  REQUESTED 10:30A START    PERITONEOCENTESIS N/A 2/19/2020    Procedure: PARACENTESIS, ABDOMINAL;  Surgeon: Dosc Diagnostic Provider;  Location: [facility] OR;  Service: Radiology;  Laterality: N/A;    PERITONEOCENTESIS N/A 2/28/2020    Procedure: PARACENTESIS, ABDOMINAL;  Surgeon: Dos Diagnostic Provider;  Location: [facility] OR;  Service: Radiology;  Laterality: N/A;  REQUESTED 10AM START    PHACOEMULSIFICATION OF CATARACT Right 9/26/2018    Procedure: PHACOEMULSIFICATION, CATARACT;  Surgeon: Perla Cortés MD;  Location: [address];  Service: Ophthalmology;  Laterality: Right;    REPEAT ABDOMINAL PARACENTESIS N/A 2/11/2019    Procedure: PARACENTESIS, ABDOMINAL, REPEAT;  Surgeon: Dosc Diagnostic Provider;  Location: [facility] OR;  Service: Radiology;  Laterality: N/A;  1PM START    REPEAT ABDOMINAL PARACENTESIS N/A 9/12/2019    Procedure: PARACENTESIS, ABDOMINAL, REPEAT;  Surgeon: Dosc Diagnostic Provider;  Location: [facility] OR;  Service: Radiology;  Laterality: N/A;  9AM START    REPEAT ABDOMINAL PARACENTESIS N/A 12/2/2019    Procedure:  PARACENTESIS, ABDOMINAL, REPEAT;  Surgeon: Dosc Diagnostic Provider;  Location: [facility] OR;  Service: Radiology;  Laterality: N/A;  3PM START    REPEAT ABDOMINAL PARACENTESIS N/A 1/10/2020    Procedure: PARACENTESIS, ABDOMINAL, REPEAT;  Surgeon: Dosc Diagnostic Provider;  Location: [facility];  Service: Radiology;  Laterality: N/A;  1130AM START    REPEAT ABDOMINAL PARACENTESIS N/A 1/20/2020    Procedure: PARACENTESIS, ABDOMINAL, REPEAT;  Surgeon: Dosc Diagnostic Provider;  Location: [facility] OR;  Service: Radiology;  Laterality: N/A;  1PM START    REPEAT ABDOMINAL PARACENTESIS N/A 1/31/2020    Procedure: PARACENTESIS, ABDOMINAL, REPEAT;  Surgeon: Dosc Diagnostic Provider;  Location: [facility] OR;  Service: Radiology;  Laterality: N/A;  10AM START    REPEAT ABDOMINAL PARACENTESIS N/A 3/16/2020    Procedure: PARACENTESIS, ABDOMINAL, REPEAT;  Surgeon: Dosc Diagnostic Provider;  Location: [facility] OR;  Service: Radiology;  Laterality: N/A;  10AM START    REPEAT ABDOMINAL PARACENTESIS N/A 3/30/2020    Procedure: PARACENTESIS, ABDOMINAL, REPEAT;  Surgeon: Dosc Diagnostic Provider;  Location: [facility] OR;  Service: Radiology;  Laterality: N/A;    REPEAT ABDOMINAL PARACENTESIS N/A 4/9/2020    Procedure: PARACENTESIS, ABDOMINAL, REPEAT;  Surgeon: Dos Diagnostic Provider;  Location: [facility] OR;  Service: Radiology;  Laterality: N/A;  1130AM START    REPEAT ABDOMINAL PARACENTESIS N/A 5/8/2020    Procedure: PARACENTESIS, ABDOMINAL, REPEAT;  Surgeon: Dosc Diagnostic Provider;  Location: [facility] OR;  Service: Radiology;  Laterality: N/A;  9AM START    REPEAT ABDOMINAL PARACENTESIS N/A 5/21/2020    Procedure: PARACENTESIS, ABDOMINAL, REPEAT;  Surgeon: Dosc Diagnostic Provider;  Location: [facility] OR;  Service: Radiology;  Laterality: N/A;  10AM START    REPEAT ABDOMINAL PARACENTESIS N/A 6/5/2020    Procedure: PARACENTESIS, ABDOMINAL, REPEAT;  Surgeon: Dosc Diagnostic Provider;  Location: [facility] OR;  Service: Radiology;  Laterality: N/A;  NOON START     REPEAT ABDOMINAL PARACENTESIS N/A 7/10/2020    Procedure: PARACENTESIS, ABDOMINAL, REPEAT;  Surgeon: Dosc Diagnostic Provider;  Location: [facility] OR;  Service: Radiology;  Laterality: N/A;  1PM START    REPEAT ABDOMINAL PARACENTESIS N/A 8/20/2020    Procedure: PARACENTESIS, ABDOMINAL, REPEAT;  Surgeon: Dosc Diagnostic Provider;  Location: [facility] OR;  Service: Radiology;  Laterality: N/A;  1PM START    REPEAT ABDOMINAL PARACENTESIS N/A 9/4/2020    Procedure: PARACENTESIS, ABDOMINAL, REPEAT;  Surgeon: Dosc Diagnostic Provider;  Location: [facility] OR;  Service: Radiology;  Laterality: N/A;  11 AM START    REPEAT ABDOMINAL PARACENTESIS N/A 9/16/2020    Procedure: PARACENTESIS, ABDOMINAL, REPEAT;  Surgeon: Dosc Diagnostic Provider;  Location: [facility] OR;  Service: Radiology;  Laterality: N/A;  START 2:00 P.M.    REPEAT ABDOMINAL PARACENTESIS N/A 9/28/2020    Procedure: PARACENTESIS, ABDOMINAL, REPEAT;  Surgeon: Dosc Diagnostic Provider;  Location: [facility] OR;  Service: Radiology;  Laterality: N/A;  1 P.M. START    REPEAT ABDOMINAL PARACENTESIS N/A 11/3/2020    Procedure: PARACENTESIS, ABDOMINAL, REPEAT;  Surgeon: Dosc Diagnostic Provider;  Location: [facility] OR;  Service: Radiology;  Laterality: N/A;  11AM START    REPEAT ABDOMINAL PARACENTESIS N/A 11/13/2020    Procedure: PARACENTESIS, ABDOMINAL, REPEAT;  Surgeon: Dosc Diagnostic Provider;  Location: [facility] OR;  Service: Radiology;  Laterality: N/A;  12PM START    REVISION OF PROCEDURE INVOLVING GLAUCOMA DRAINAGE DEVICE Left 10/2/2019    EXTRUDING BAERVELDT /WITH PERICARDIAL PATCH GRAFT ()    Right foot surgery  10/2014    TOE AMPUTATION Right     first and second    TOE AMPUTATION Left 11/16/2018    Procedure: AMPUTATION, TOES  2-5;  Surgeon: Mitch Chan MD;  Location: [facility] OR;  Service: Vascular;  Laterality: Left;    TOE AMPUTATION Left 12/5/2018    Procedure: AMPUTATION, TOE;  Surgeon: Mitch hCan MD;  Location: [facility] OR;  Service: Vascular;   Laterality: Left;  left great toe    TONSILLECTOMY       Home Meds:   Prior to Admission medications    Medication Sig Start Date End Date Taking? Authorizing Provider   allopurinoL (ZYLOPRIM) 300 MG tablet Take 1 tablet (300 mg total) by mouth once daily. 10/6/20   Rubén Davis MD   aspirin (ECOTRIN) 81 MG EC tablet Take 81 mg by mouth once daily.    Historical Provider   bisacodyl (DULCOLAX) 5 mg EC tablet Take 5 mg by mouth daily as needed for Constipation.    Historical Provider   brimonidine 0.2% (ALPHAGAN) 0.2 % Drop Place 1 drop into both eyes 3 (three) times daily. 12/12/19   Perla Cortés MD   carvediloL (COREG) 3.125 MG tablet TAKE 1 TABLET(3.125 MG) BY MOUTH TWICE DAILY 8/17/20   Rubén Daivs MD   coenzyme Q10 100 mg capsule Take 100 mg by mouth every morning.    Historical Provider   collagenase (SANTYL) ointment Apply topically once daily. 10/20/20   Rubén Davis MD   dorzolamide-timolol 2-0.5% (COSOPT) 22.3-6.8 mg/mL ophthalmic solution Place 1 drop into both eyes 3 (three) times daily. 12/12/19   Perla Cortés MD   ezetimibe (ZETIA) 10 mg tablet TAKE 1 TABLET(10 MG) BY MOUTH EVERY DAY 7/16/20   Rubén Davis MD   fish oil-omega-3 fatty acids 300-1,000 mg capsule Take 1 capsule by mouth 2 (two) times daily. 1/23/19   Sara Ching MD   gabapentin (NEURONTIN) 100 MG capsule TAKE 2 CAPSULES(200 MG) BY MOUTH EVERY EVENING 9/21/20   Rubén Davis MD   HYDROcodone-acetaminophen (NORCO) 7.5-325 mg per tablet Take 1 tablet by mouth every 8 (eight) hours as needed for Pain. 10/20/20   Rubén Davis MD   insulin (LANTUS SOLOSTAR U-100 INSULIN) glargine 100 units/mL (3mL) SubQ pen Inject 35 units once daily 9/11/20   Sheryl Madison NP   insulin degludec-liraglutide (XULTOPHY 100/3.6) 100 unit-3.6 mg /mL (3 mL) InPn Inject 35 Units into the skin once daily.    Historical Provider   MULTIVIT,THER IRON,CA,FA & MIN (MULTIVITAMIN) Tab Take 1 tablet by mouth every morning.      "Historical Provider   oxyCODONE (ROXICODONE) 5 MG immediate release tablet Take 1 tablet (5 mg total) by mouth every 6 (six) hours as needed for Pain. 11/18/20   Kendall Nieto MD   oxyCODONE (ROXICODONE) 5 MG immediate release tablet Take 1 tablet (5 mg total) by mouth every 6 (six) hours as needed for Pain. 11/18/20   Mitch Chan MD   pen needle, diabetic (BD ULTRA-FINE AMADOR PEN NEEDLE) 32 gauge x 5/32" Ndle 1 Device by Misc.(Non-Drug; Combo Route) route 2 (two) times a day. 9/11/20   Sheryl Madison NP   torsemide (DEMADEX) 20 MG Tab Take 4 tablets (80 mg total) by mouth 2 (two) times daily. 11/5/20 12/5/20  Rubén Davis MD     Anticoagulants/Antiplatelets: no anticoagulation    Allergies:   Review of patient's allergies indicates:   Allergen Reactions    Statins-hmg-coa reductase inhibitors      Generalized Pain    Onglyza [saxagliptin]     Penicillins Rash     Sedation History:  no adverse reactions     Review of Systems:   Hematological: no known coagulopathies  Respiratory: positive for - orthopnea and shortness of breath  Cardiovascular: positive for - dyspnea on exertion, orthopnea and shortness of breath  Gastrointestinal: positive for - abdominal pain and distension  Genito-Urinary: no dysuria, trouble voiding, or hematuria  Musculoskeletal: negative  Neurological: no TIA or stroke symptoms      OBJECTIVE:     Vital Signs (Most Recent)     Physical Exam:  General: no acute distress  Mental Status: alert and oriented to person, place and time  HEENT: normocephalic, atraumatic  Chest: mild labored breathing  Heart: regular heart rate  Abdomen: +distended. No TTP/r/g.  Extremity: moves all extremities    Laboratory  Lab Results   Component Value Date    INR 1.1 05/31/2019       Lab Results   Component Value Date    WBC 6.66 10/27/2020    HGB 11.5 (L) 10/27/2020    HCT 36.0 (L) 10/27/2020    MCV 90 10/27/2020     10/27/2020      Lab Results   Component Value Date    GLU 87 10/27/2020    "  10/27/2020    K 4.6 10/27/2020     10/27/2020    CO2 29 10/27/2020    BUN 59 (H) 10/27/2020    CREATININE 1.5 (H) 10/27/2020    CALCIUM 8.9 10/27/2020    MG 2.1 05/31/2019    ALT 12 10/27/2020    AST 15 10/27/2020    ALBUMIN 2.7 (L) 10/27/2020    BILITOT 0.6 10/27/2020     ASSESSMENT/PLAN:     61 y/o M with CKD III and combined systolic/diastolic CHF complicated by recurrent abdominal distension and pain 2/2 recurrent painful, tense ascites without TTP on PE to suggest SBP, requiring frequent large-volume therapeutic paracentesis.      1. Recurrent painful, tense ascites - Will attempt U/S-guided therapeutic paracentesis with local anesthetic only and albumin infusion post PRN per institutional protocol.      Risks (including, but not limited to, pain, bleeding, infection, damage to nearby structures, failure to obtain sufficient material for a diagnosis, the need for additional procedures, and death), benefits, and alternatives were discussed with the patient. All questions were answered to the best of my abilities. The patient wishes to proceed with the procedure. Written informed consent was obtained.     Thank you for considering IR for the care of your patient.      Zach Cha MD  Interventional Radiology

## 2020-11-24 LAB — FUNGUS SPEC CULT: NORMAL

## 2020-11-27 ENCOUNTER — HOSPITAL ENCOUNTER (OUTPATIENT)
Dept: WOUND CARE | Facility: HOSPITAL | Age: 60
Discharge: HOME | End: 2020-11-27
Attending: PODIATRIST
Payer: MEDICAID

## 2020-11-27 VITALS — HEART RATE: 78 BPM | TEMPERATURE: 98 F | DIASTOLIC BLOOD PRESSURE: 66 MMHG | SYSTOLIC BLOOD PRESSURE: 125 MMHG

## 2020-11-27 DIAGNOSIS — L97.519 TYPE 2 DIABETES MELLITUS WITH RIGHT DIABETIC FOOT ULCER: ICD-10-CM

## 2020-11-27 DIAGNOSIS — E11.621 TYPE 2 DIABETES MELLITUS WITH RIGHT DIABETIC FOOT ULCER: ICD-10-CM

## 2020-11-27 DIAGNOSIS — L97.909 VENOUS STASIS ULCER WITH EDEMA OF LOWER LEG: ICD-10-CM

## 2020-11-27 DIAGNOSIS — I70.244: ICD-10-CM

## 2020-11-27 DIAGNOSIS — R60.9 VENOUS STASIS ULCER WITH EDEMA OF LOWER LEG: ICD-10-CM

## 2020-11-27 DIAGNOSIS — L97.529 TYPE 2 DIABETES MELLITUS WITH LEFT DIABETIC FOOT ULCER: ICD-10-CM

## 2020-11-27 DIAGNOSIS — I83.899 VENOUS STASIS ULCER WITH EDEMA OF LOWER LEG: ICD-10-CM

## 2020-11-27 DIAGNOSIS — L97.516 DIABETIC ULCER OF TOE OF RIGHT FOOT ASSOCIATED WITH TYPE 2 DIABETES MELLITUS, WITH BONE INVOLVEMENT WITHOUT EVIDENCE OF NECROSIS: ICD-10-CM

## 2020-11-27 DIAGNOSIS — I83.009 VENOUS STASIS ULCER WITH EDEMA OF LOWER LEG: ICD-10-CM

## 2020-11-27 DIAGNOSIS — E11.621 TYPE 2 DIABETES MELLITUS WITH LEFT DIABETIC FOOT ULCER: ICD-10-CM

## 2020-11-27 DIAGNOSIS — E11.42 DM TYPE 2 WITH DIABETIC PERIPHERAL NEUROPATHY: Primary | Chronic | ICD-10-CM

## 2020-11-27 DIAGNOSIS — E11.621 DIABETIC ULCER OF TOE OF RIGHT FOOT ASSOCIATED WITH TYPE 2 DIABETES MELLITUS, WITH BONE INVOLVEMENT WITHOUT EVIDENCE OF NECROSIS: ICD-10-CM

## 2020-11-27 PROCEDURE — 99215 OFFICE O/P EST HI 40 MIN: CPT | Performed by: PODIATRIST

## 2020-11-27 PROCEDURE — 99213 OFFICE O/P EST LOW 20 MIN: CPT | Mod: ,,, | Performed by: PODIATRIST

## 2020-11-27 PROCEDURE — 99213 PR OFFICE/OUTPT VISIT, EST, LEVL III, 20-29 MIN: ICD-10-PCS | Mod: ,,, | Performed by: PODIATRIST

## 2020-11-27 PROCEDURE — 97602 WOUND(S) CARE NON-SELECTIVE: CPT

## 2020-11-27 NOTE — PROGRESS NOTES
[facility]                Progress Note    Subjective:       Patient ID: Marvin Ray is a 60 y.o. male.    Chief Complaint: No chief complaint on file.    Patient ambulated to exam bed without assistance. 4/10 pain reported to Right foot. Right Foot dressing with large serous drainage (applied 2 days ago). Left Foot dressing intact with moderate serosanguinous drainage. Directed to change daily with antibiotic powder to Right Heel/3rd Toe with continued additional use of santyl to Right Heel. Patient agrees to try and offload Right Heel as much as possible off of surfaces. Right Leg/Ankle Diabetic Ulcers with surgical intervention appear to be healing.        Review of Systems   Constitutional: Negative.  Negative for activity change, appetite change, chills, fatigue and fever.   HENT: Negative.    Eyes: Negative.    Respiratory: Negative.  Negative for cough and shortness of breath.    Cardiovascular: Positive for leg swelling. Negative for chest pain.   Gastrointestinal: Negative for abdominal pain, anal bleeding, blood in stool, constipation, diarrhea, nausea and vomiting.   Musculoskeletal: Positive for joint swelling and myalgias.   Skin: Positive for wound.   Neurological: Positive for numbness. Negative for weakness.        + paresthesia    Psychiatric/Behavioral: Negative.          Objective:        Physical Exam  Vitals signs reviewed.   Constitutional:       Appearance: Normal appearance.   HENT:      Head: Normocephalic and atraumatic.      Nose: Nose normal.      Mouth/Throat:      Mouth: Mucous membranes are moist.      Pharynx: Oropharynx is clear.   Eyes:      Extraocular Movements: Extraocular movements intact.      Pupils: Pupils are equal, round, and reactive to light.   Neck:      Musculoskeletal: Normal range of motion and neck supple.   Cardiovascular:      Rate and Rhythm: Normal rate and regular rhythm.   Pulmonary:      Effort: Pulmonary  effort is normal. No respiratory distress.      Breath sounds: No wheezing.   Abdominal:      General: There is distension.      Palpations: Abdomen is soft.      Tenderness: There is no abdominal tenderness. There is no guarding.   Musculoskeletal:      Right lower leg: Edema present.      Left lower leg: Edema present.      Comments: Fat pad atrophy to heels and met heads bilateral     Skin:     General: Skin is warm and dry.      Findings: Wound (see description) present.   Neurological:      Mental Status: He is alert and oriented to person, place, and time.      Sensory: No sensory deficit.         Vitals:    11/27/20 0850   BP: 125/66   Pulse: 78   Temp: 97.7 °F (36.5 °C)       Assessment:           ICD-10-CM ICD-9-CM   1. DM type 2 with diabetic peripheral neuropathy  E11.42 250.60     357.2   2. Type 2 diabetes mellitus with left diabetic foot ulcer  E11.621 250.80    L97.529 707.15   3. Atherosclerosis of left lower extremity with ulceration of midfoot  I70.244 440.23     707.14   4. Diabetic ulcer of toe of right foot associated with type 2 diabetes mellitus, with bone involvement without evidence of necrosis  E11.621 250.80    L97.516 707.15   5. Type 2 diabetes mellitus with right diabetic foot ulcer  E11.621 250.80    L97.519 707.15   6. Venous stasis ulcer with edema of lower leg  I83.009 454.0    I83.899 782.3    L97.909     R60.9             Wound 11/02/18 Diabetic Ulcer Left medial Foot (Active)   11/02/18     Pre-existing: Yes   Primary Wound Type: Diabetic ulc   Side: Left   Orientation: medial   Location: Foot   Wound Number (optional):    Ankle-Brachial Index:    Pulses:    Removal Indication and Assessment:    Wound Outcome:    (Retired) Wound Type:    (Retired) Wound Length (cm):    (Retired) Wound Width (cm):    (Retired) Depth (cm):    Wound Description (Comments):    Removal Indications:    Wound Image   11/27/20 0900   Wound WDL ex 11/27/20 0900   Dressing Appearance Moist drainage  11/27/20 0900   Drainage Amount Moderate 11/27/20 0900   Drainage Characteristics/Odor Serosanguineous 11/27/20 0900   Appearance Pink;Red 11/27/20 0900   Tissue loss description Full thickness 11/27/20 0900   Red (%), Wound Tissue Color 100 % 11/27/20 0900   Periwound Area Intact;Scar tissue 11/27/20 0900   Wound Edges Open 11/27/20 0900   Wound Length (cm) 5 cm 11/27/20 0900   Wound Width (cm) 6.5 cm 11/27/20 0900   Wound Depth (cm) 0.15 cm 11/27/20 0900   Wound Volume (cm^3) 4.88 cm^3 11/27/20 0900   Wound Surface Area (cm^2) 32.5 cm^2 11/27/20 0900   Care Cleansed with:;Soap and water;Sterile normal saline 11/27/20 0900   Dressing Changed 11/27/20 0900   Periwound Care Moisture barrier applied 11/27/20 0900   Off Loading Football dressing 11/27/20 0900            Wound 05/08/20 1015 Diabetic Ulcer Left distal Foot (Active)   05/08/20 1015    Pre-existing: Yes   Primary Wound Type: Diabetic ulc   Side: Left   Orientation: distal   Location: Foot   Wound Number (optional):    Ankle-Brachial Index:    Pulses:    Removal Indication and Assessment:    Wound Outcome:    (Retired) Wound Type:    (Retired) Wound Length (cm):    (Retired) Wound Width (cm):    (Retired) Depth (cm):    Wound Description (Comments):    Removal Indications:    Wound Image   11/27/20 0900   Wound WDL ex 11/27/20 0900   Dressing Appearance Moist drainage 11/27/20 0900   Drainage Amount Moderate 11/27/20 0900   Drainage Characteristics/Odor Serosanguineous 11/27/20 0900   Appearance Red;Pink 11/27/20 0900   Tissue loss description Full thickness 11/27/20 0900   Red (%), Wound Tissue Color 100 % 11/27/20 0900   Periwound Area Intact;Scar tissue 11/27/20 0900   Wound Edges Open 11/27/20 0900   Wound Length (cm) 3 cm 11/27/20 0900   Wound Width (cm) 5.6 cm 11/27/20 0900   Wound Depth (cm) 0.15 cm 11/27/20 0900   Wound Volume (cm^3) 2.52 cm^3 11/27/20 0900   Wound Surface Area (cm^2) 16.8 cm^2 11/27/20 0900   Care Cleansed with:;Soap and  water;Sterile normal saline 11/27/20 0900   Dressing Changed 11/27/20 0900   Off Loading Football dressing 11/27/20 0900            Wound 08/11/20 1051 Diabetic Ulcer Right medial Malleolus/Ankle (Active)   08/11/20 1051    Pre-existing: No   Primary Wound Type: Diabetic ulc   Side: Right   Orientation: medial   Location: Malleolus/Ankle   Wound Number (optional):    Ankle-Brachial Index:    Pulses:    Removal Indication and Assessment:    Wound Outcome:    (Retired) Wound Type:    (Retired) Wound Length (cm):    (Retired) Wound Width (cm):    (Retired) Depth (cm):    Wound Description (Comments):    Removal Indications:    Wound Image   11/27/20 0900   Wound WDL ex 11/27/20 0900   Dressing Appearance Moist drainage 11/27/20 0900   Drainage Amount Moderate 11/27/20 0900   Drainage Characteristics/Odor Serosanguineous 11/27/20 0900   Appearance Pink;Yellow 11/27/20 0900   Tissue loss description Full thickness 11/27/20 0900   Red (%), Wound Tissue Color 95 % 11/27/20 0900   Yellow (%), Wound Tissue Color 5 % 11/27/20 0900   Periwound Area Macerated 11/27/20 0900   Wound Edges Open 11/27/20 0900   Wound Length (cm) 1.5 cm 11/27/20 0900   Wound Width (cm) 1.5 cm 11/27/20 0900   Wound Depth (cm) 0.1 cm 11/27/20 0900   Wound Volume (cm^3) 0.22 cm^3 11/27/20 0900   Wound Surface Area (cm^2) 2.25 cm^2 11/27/20 0900   Care Cleansed with:;Soap and water;Sterile normal saline 11/27/20 0900   Dressing Changed 11/27/20 0900   Periwound Care Moisture barrier applied 11/27/20 0900   Off Loading Football dressing 11/27/20 0900            Wound 09/29/20 1045 Other (comment) Right medial Toe, fifth (Active)   09/29/20 1045    Pre-existing:    Primary Wound Type: Other   Side: Right   Orientation: medial   Location: Toe, fifth   Wound Number (optional):    Ankle-Brachial Index:    Pulses:    Removal Indication and Assessment:    Wound Outcome:    (Retired) Wound Type:    (Retired) Wound Length (cm):    (Retired) Wound Width (cm):     (Retired) Depth (cm):    Wound Description (Comments):    Removal Indications:    Wound WDL WDL 11/27/20 0900   Appearance Epithelialization 11/27/20 0900   Tissue loss description Not applicable 11/27/20 0900   Periwound Area Intact 11/27/20 0900   Wound Edges Rolled/closed 11/27/20 0900   Wound Length (cm) 0 cm 11/27/20 0900   Wound Width (cm) 0 cm 11/27/20 0900   Wound Depth (cm) 0 cm 11/27/20 0900   Wound Volume (cm^3) 0 cm^3 11/27/20 0900   Wound Surface Area (cm^2) 0 cm^2 11/27/20 0900   Care Cleansed with:;Soap and water;Sterile normal saline 11/27/20 0900            Wound 11/10/20 1030 Diabetic Ulcer Right lateral Foot (Active)   11/10/20 1030    Pre-existing: Yes   Primary Wound Type: Diabetic ulc   Side: Right   Orientation: lateral   Location: Foot   Wound Number (optional):    Ankle-Brachial Index:    Pulses:    Removal Indication and Assessment:    Wound Outcome:    (Retired) Wound Type:    (Retired) Wound Length (cm):    (Retired) Wound Width (cm):    (Retired) Depth (cm):    Wound Description (Comments):    Removal Indications:    Wound Image   11/27/20 0900   Wound WDL ex 11/27/20 0900   Dressing Appearance Moist drainage 11/27/20 0900   Drainage Amount Moderate 11/27/20 0900   Drainage Characteristics/Odor Sanguineous 11/27/20 0900   Appearance Red;Fibrin 11/27/20 0900   Tissue loss description Partial thickness 11/27/20 0900   Red (%), Wound Tissue Color 20 % 11/27/20 0900   Yellow (%), Wound Tissue Color 80 % 11/27/20 0900   Periwound Area Dry 11/27/20 0900   Wound Edges Defined 11/27/20 0900   Wound Length (cm) 1 cm 11/27/20 0900   Wound Width (cm) 1 cm 11/27/20 0900   Wound Depth (cm) 0.1 cm 11/27/20 0900   Wound Volume (cm^3) 0.1 cm^3 11/27/20 0900   Wound Surface Area (cm^2) 1 cm^2 11/27/20 0900   Care Cleansed with:;Soap and water;Sterile normal saline 11/27/20 0900   Dressing Changed 11/27/20 0900   Periwound Care Moisture barrier applied 11/27/20 0900   Off Loading Football dressing  11/27/20 0900            Incision/Site 06/16/20 0930 Right Toe, third anterior (Active)   06/16/20 0930   Present Prior to Hospital Arrival?: Yes   Side: Right   Location: Toe, third   Orientation: anterior   Incision Type:    Closure Method:    Additional Comments:    Removal Indication and Assessment:    Wound Outcome:    Removal Indications:    Wound Image   11/27/20 0900   Incision WDL ex 11/27/20 0900   Dressing Appearance Dried drainage 11/27/20 0900   Drainage Amount Small 11/27/20 0900   Appearance Bone;Pink;Red 11/27/20 0900   Red (%), Wound Tissue Color 100 % 11/27/20 0900   Periwound Area Intact 11/27/20 0900   Wound Edges Open 11/27/20 0900   Wound Length (cm) 0.4 cm 11/27/20 0900   Wound Width (cm) 0.7 cm 11/27/20 0900   Wound Depth (cm) 0.25 cm 11/27/20 0900   Wound Volume (cm^3) 0.07 cm^3 11/27/20 0900   Wound Surface Area (cm^2) 0.28 cm^2 11/27/20 0900   Care Cleansed with:;Soap and water;Sterile normal saline 11/27/20 0900   Dressing Changed 11/27/20 0900   Periwound Care Moisture barrier applied 11/27/20 0900   Off Loading Football dressing 11/27/20 0900            Incision/Site 11/09/20 1240 Right Leg (Active)   11/09/20 1240   Present Prior to Hospital Arrival?:    Side: Right   Location: Leg   Orientation:    Incision Type:    Closure Method:    Additional Comments:    Removal Indication and Assessment:    Wound Outcome:    Removal Indications:    Wound Image   11/27/20 0900   Incision WDL ex 11/27/20 0900   Dressing Appearance Moist drainage 11/27/20 0900   Drainage Amount Moderate 11/27/20 0900   Drainage Characteristics/Odor Serosanguineous 11/27/20 0900   Appearance Red;Granulating 11/27/20 0900   Red (%), Wound Tissue Color 100 % 11/27/20 0900   Periwound Area Macerated 11/27/20 0900   Wound Edges Open 11/27/20 0900   Wound Length (cm) 4.5 cm 11/27/20 0900   Wound Width (cm) 2 cm 11/27/20 0900   Wound Depth (cm) 0.15 cm 11/27/20 0900   Wound Volume (cm^3) 1.35 cm^3 11/27/20 0900   Wound  Surface Area (cm^2) 9 cm^2 11/27/20 0900   Care Cleansed with:;Soap and water;Sterile normal saline 11/27/20 0900   Dressing Changed 11/27/20 0900            Incision/Site 11/18/20 1458 Right Heel (Active)   11/18/20 1458   Present Prior to Hospital Arrival?:    Side: Right   Location: Heel   Orientation:    Incision Type:    Closure Method:    Additional Comments:    Removal Indication and Assessment:    Wound Outcome:    Removal Indications:    Wound Image   11/27/20 0900   Incision WDL ex 11/27/20 0900   Dressing Appearance Moist drainage 11/27/20 0900   Drainage Amount Large 11/27/20 0900   Drainage Characteristics/Odor Serous 11/27/20 0900   Appearance Red;Yellow;Bone;Adipose;Slough 11/27/20 0900   Red (%), Wound Tissue Color 15 % 11/27/20 0900   Yellow (%), Wound Tissue Color 85 % 11/27/20 0900   Periwound Area Intact 11/27/20 0900   Wound Edges Open 11/27/20 0900   Wound Length (cm) 8.7 cm 11/27/20 0900   Wound Width (cm) 8.5 cm 11/27/20 0900   Wound Depth (cm) 1.2 cm 11/27/20 0900   Wound Volume (cm^3) 88.74 cm^3 11/27/20 0900   Wound Surface Area (cm^2) 73.95 cm^2 11/27/20 0900   Undermining (depth (cm)/location) 1.2 (cm) at 6 o'clock  11/27/20 0900   Care Cleansed with:;Soap and water;Sterile normal saline 11/27/20 0900   Dressing Changed 11/27/20 0900   Off Loading Football dressing 11/27/20 0900           Plan:                   Greater than 50% of this visit spent on counseling and coordination of care.    Education about the prevention of limb loss.    Discussed wound healing cycle, skin integrity, ways to care for skin.Counseled patient on the effects of PAD and high blood glucose on healing. He verbalizes understanding that it can increase the chances of delayed healing and this prolonged exposure leads to infection or progression of infection which subsequently can result in loss of limb.    Adequate vitamin supplementation, protein intake, and hydration - discussed with patient    The wound is  cleansed of foreign material as much as possible and the base inspected for bone or abscess.     Patient is s/p debridement by vascular surgery.  New areas of granulation noted however dense fibrin remains. Debridement deferred to the right heel, I am of the belief that if I were to debride all the fibrin is tissue of the heel his calcaneus would be exposed. I believe patient will benefit from application of skin substitute.  Will attempt to acquire in [facility].  In the meantime will continue with Santyl for debridement.    Patient also has exposed bone and joint of the right 3rd digit.     He is recommended to float heels at all times    Abx per ID via midline    Short-term goals include maintaining good offloading and minimizing bioburden, promoting granulation and epithelialization to healing.  Long-term goals include keeping the wound healed by good offloading and medical management under the direction of internist.      Orders Placed This Encounter   Procedures    Change dressing     Clean with debrisoft. Apply iodosorb. Cover with mextra, sandro foam, cast padding x 2, stockinet. Change per family every 2-3 days.    Change dressing     Clean with saline. Apply santyl/antibiotic powder to heel and antibiotic powder to 3rd toe, gauze, mextra, cast padding x 2, stockinet.    Change dressing     Clean with saline. Apply gentian violet, cavilon. Iodosorb and foam border to Right Ankle/Leg. Change every 2-3 days per family.        Follow up in about 1 week (around 12/4/2020) for wound care.

## 2020-11-30 ENCOUNTER — TELEPHONE (OUTPATIENT)
Dept: INFECTIOUS DISEASES | Facility: CLINIC | Age: 60
End: 2020-11-30

## 2020-12-01 ENCOUNTER — HOSPITAL ENCOUNTER (OUTPATIENT)
Dept: INTERVENTIONAL RADIOLOGY/VASCULAR | Facility: HOSPITAL | Age: 60
Discharge: HOME | End: 2020-12-01
Attending: FAMILY MEDICINE
Payer: MEDICAID

## 2020-12-01 ENCOUNTER — HOSPITAL ENCOUNTER (OUTPATIENT)
Facility: HOSPITAL | Age: 60
Discharge: HOME | End: 2020-12-01
Attending: RADIOLOGY | Admitting: RADIOLOGY
Payer: MEDICAID

## 2020-12-01 VITALS — SYSTOLIC BLOOD PRESSURE: 125 MMHG | DIASTOLIC BLOOD PRESSURE: 71 MMHG

## 2020-12-01 DIAGNOSIS — R18.8 OTHER ASCITES: ICD-10-CM

## 2020-12-01 PROCEDURE — 49083 IR PARACENTESIS WITH IMAGING: ICD-10-PCS | Mod: ,,, | Performed by: RADIOLOGY

## 2020-12-01 PROCEDURE — 49083 ABD PARACENTESIS W/IMAGING: CPT

## 2020-12-01 PROCEDURE — 49083 ABD PARACENTESIS W/IMAGING: CPT | Mod: ,,, | Performed by: RADIOLOGY

## 2020-12-01 PROCEDURE — A7048 VACUUM DRAIN BOTTLE/TUBE KIT: HCPCS

## 2020-12-01 NOTE — H&P
[facility]  History & Physical - Short Stay  Interventional Radiology    SUBJECTIVE:     Chief Complaint/Reason for Admission: Recurrent ascites    Informant(s):  self and Electronic Health Record    History of Present Illness:  Marvin Ray is a 60 y.o. male with a history of recurrent ascites.    Patient presents for paracentesis.    Scheduled Meds:   Continuous Infusions:   PRN Meds:     Review of patient's allergies indicates:   Allergen Reactions    Statins-hmg-coa reductase inhibitors      Generalized Pain    Onglyza [saxagliptin]     Penicillins Rash       Past Medical History:   Diagnosis Date    Arthritis     Cellulitis     CKD (chronic kidney disease), stage III     Coronary artery disease     Diabetes mellitus     Diabetic retinopathy     Diabetic ulcer of left foot     Glaucoma     Gout     Hyperlipemia     Hypertension     ICD (implantable cardioverter-defibrillator) in place 11/02/2018    Left chest    Non-pressure chronic ulcer of other part of left foot with fat layer exposed 10/23/2018    PVD (peripheral vascular disease)     Type 2 diabetes mellitus with left diabetic foot ulcer 10/29/2018    Unsteady gait     uses a wheelchair     Past Surgical History:   Procedure Laterality Date    AHMED GLAUCOMA IMPLANT Left 2011    DONE AT [facility]    AORTOGRAPHY WITH SERIALOGRAPHY Left 4/30/2019    Procedure: AORTOGRAM, WITH SERIALOGRAPHY, LEFT LOWER EXTREMITY, POSSIBLE INTERVENTION;  Surgeon: Mitch Chan MD;  Location: [facility] OR;  Service: Vascular;  Laterality: Left;  RN PRE OP 4-23-19.  NO H & P, No Cosent  [place]    AORTOGRAPHY WITH SERIALOGRAPHY Right 10/6/2020    Procedure: AORTOGRAM, WITH SERIALOGRAPHY, RIGHT LOWER EXTREMITY ANGIOGRAM, POSSIBLE INTERVENTION;  Surgeon: Mitch Chan MD;  Location: [facility] OR;  Service: Vascular;  Laterality: Right;  RN PREOP 10/1/2020--COVID NEGATIVE ON 10/5    BAERVELDT GLAUCOMA IMPLANT Left 2012    WITH CATARACT  EXTRACTION//DONE AT [facility]    CARDIAC CATHETERIZATION Left 05/2016    CARDIAC DEFIBRILLATOR PLACEMENT Left 11/02/2018    CATARACT EXTRACTION W/  INTRAOCULAR LENS IMPLANT Left 2012    WITH BAERVEDT//DONE AT [facility]    CATARACT EXTRACTION W/  INTRAOCULAR LENS IMPLANT Right 09/26/2018    COMPLEX ()    CB DESTRUCTION WITH CYCLO G6 Left 02/15/2017        CYST REMOVAL      DEBRIDEMENT OF FOOT  12/28/2018    Procedure: DEBRIDEMENT, FOOT;  Surgeon: Mitch Wall MD;  Location: [facility] OR;  Service: Vascular;;    DEBRIDEMENT OF FOOT  6/5/2019    Procedure: DEBRIDEMENT, FOOT;  Surgeon: Mitch Wall MD;  Location: [facility] OR;  Service: Vascular;;    EXAMINATION UNDER ANESTHESIA Left 12/5/2018    Procedure: Exam under anesthesia, left foot debridement, washout and all other indicated procedures;  Surgeon: Mitch Wall MD;  Location: [facility] OR;  Service: Vascular;  Laterality: Left;  1030AM START  RN PREOP 12/3/2018-----AICD  SEEN BY DR JAMES 12/4    EXAMINATION UNDER ANESTHESIA Left 2/13/2019    Procedure: LEFT FOOT WOUND DEBRIDEMENT, WASHOUT AND ALL OTHER INDICATED PROCEDURES;  Surgeon: Mitch Wall MD;  Location: [facility] OR;  Service: Vascular;  Laterality: Left;  requesting 1st case start  THIS CASE 1ST PER DR WALL ON 2/1/19 @ 844AM -[place]  RN PREOP 2/6/2019  HAS CARDIAC CLEARANCE    EXAMINATION UNDER ANESTHESIA Left 12/28/2018    Procedure: Exam under anesthesia, left foot debridement, left foot washout, possible left second through fifth metatarsal resection;  Surgeon: Mitch Wall MD;  Location: [facility] OR;  Service: Vascular;  Laterality: Left;  1:00pm start per julissa @ 8:58am  RN  PHONE PRE OP 12-27-18--=Pt coming from [facility] on [address]  NEED CONSENT    EXAMINATION UNDER ANESTHESIA Left 6/5/2019    Procedure: LEFT FOOT DEBRIDEMENT, WASHOUT AND ALL OTHER INDICATED PROCEDURES;  Surgeon: Mitch Wlal MD;  Location: [facility] OR;  Service: Vascular;   Laterality: Left;  RN PRE OP 5-31-19------ICD------NEED CONSENT    EXAMINATION UNDER ANESTHESIA Right 11/9/2020    Procedure: RIGHT FOOT DEBRIDEMENT, WASHOUT AND ALL OTHER INDICATED PROCEDURES;  Surgeon: Mitch Chan MD;  Location: [facility] OR;  Service: Vascular;  Laterality: Right;  RN PREOP 10/27/2020, --COVID NEGATIVE ON 11/6, has cardiac clearance/[place] 10/27/2020, pt has ICD  NEEDS ORDERS    INCISION AND DRAINAGE Left 11/14/2018    Procedure: Incision and Drainage, left lower extremity debridement, washout;  Surgeon: Mitch Chan MD;  [facility];  Service: Vascular;  Laterality: Left;    INCISION AND DRAINAGE Left 11/16/2018    Procedure: INCISION AND DRAINAGE;  Surgeon: Mitch Chan MD;  Location: [facility] OR;  Service: Vascular;  Laterality: Left;    INCISION AND DRAINAGE Right 11/18/2020    Procedure: Debridement and washout right lower extremity wounds;  Surgeon: Mitch Chan MD;  Location: [facility] OR 2ND FLR;  Service: Vascular;  Laterality: Right;    INTRAOCULAR PROSTHESES INSERTION Right 9/26/2018    Procedure: INSERTION, IOL PROSTHESIS;  Surgeon: Perla Cortés MD;  Location: [facility] OR 1ST FLR;  Service: Ophthalmology;  Laterality: Right;    PERITONEOCENTESIS N/A 3/1/2019    Procedure: PARACENTESIS, ABDOMINAL, INITIAL;  Surgeon: Dosc Diagnostic Provider;  Location: [facility] OR;  Service: Radiology;  Laterality: N/A;    PERITONEOCENTESIS N/A 3/25/2019    Procedure: PARACENTESIS, ABDOMINAL, INITIAL;  Surgeon: Dosc Diagnostic Provider;  Location: [facility] OR;  Service: Radiology;  Laterality: N/A;    PERITONEOCENTESIS N/A 7/22/2019    Procedure: PARACENTESIS, ABDOMINAL, INITIAL;  Surgeon: Dosc Diagnostic Provider;  [facility];  Service: Radiology;  Laterality: N/A;    PERITONEOCENTESIS N/A 8/16/2019    Procedure: PARACENTESIS, ABDOMINAL, INITIAL;  Surgeon: Dosc Diagnostic Provider;  Location: [facility] OR;  Service: Radiology;  Laterality: N/A;    PERITONEOCENTESIS  N/A 9/26/2019    Procedure: PARACENTESIS, ABDOMINAL;  Surgeon: Dosc Diagnostic Provider;  Location: [facility] OR;  Service: Radiology;  Laterality: N/A;    PERITONEOCENTESIS N/A 10/11/2019    Procedure: PARACENTESIS, ABDOMINAL;  Surgeon: Dosc Diagnostic Provider;  Location: [facility] OR;  Service: Radiology;  Laterality: N/A;    PERITONEOCENTESIS N/A 10/31/2019    Procedure: PARACENTESIS, ABDOMINAL;  Surgeon: Dosc Diagnostic Provider;  Location: [facility] OR;  Service: Radiology;  Laterality: N/A;    PERITONEOCENTESIS N/A 11/18/2019    Procedure: PARACENTESIS, ABDOMINAL;  Surgeon: Dosc Diagnostic Provider;  Location: [facility] OR;  Service: Radiology;  Laterality: N/A;    PERITONEOCENTESIS N/A 12/16/2019    Procedure: PARACENTESIS, ABDOMINAL;  Surgeon: Dosc Diagnostic Provider;  Location: [facility] OR;  Service: Radiology;  Laterality: N/A;  2PM START    PERITONEOCENTESIS N/A 2/7/2020    Procedure: PARACENTESIS, ABDOMINAL;  Surgeon: Dosc Diagnostic Provider;  Location: [facility] OR;  Service: Radiology;  Laterality: N/A;  REQUESTED 10:30A START    PERITONEOCENTESIS N/A 2/19/2020    Procedure: PARACENTESIS, ABDOMINAL;  Surgeon: Dosc Diagnostic Provider;  Location: [facility] OR;  Service: Radiology;  Laterality: N/A;    PERITONEOCENTESIS N/A 2/28/2020    Procedure: PARACENTESIS, ABDOMINAL;  Surgeon: Dosc Diagnostic Provider;  Location: [facility] OR;  Service: Radiology;  Laterality: N/A;  REQUESTED 10AM START    PHACOEMULSIFICATION OF CATARACT Right 9/26/2018    Procedure: PHACOEMULSIFICATION, CATARACT;  Surgeon: Perla Cortés MD;  [facility];  Service: Ophthalmology;  Laterality: Right;    REPEAT ABDOMINAL PARACENTESIS N/A 2/11/2019    Procedure: PARACENTESIS, ABDOMINAL, REPEAT;  Surgeon: Dosc Diagnostic Provider;  Location: [facility] OR;  Service: Radiology;  Laterality: N/A;  1PM START    REPEAT ABDOMINAL PARACENTESIS N/A 9/12/2019    Procedure: PARACENTESIS, ABDOMINAL, REPEAT;  Surgeon: Dosc Diagnostic Provider;  [facility] OR;  Service: Radiology;  Laterality: N/A;  9AM START    REPEAT ABDOMINAL PARACENTESIS N/A 12/2/2019    Procedure: PARACENTESIS, ABDOMINAL, REPEAT;  Surgeon: Dosc Diagnostic Provider;  [facility];  Service: Radiology;  Laterality: N/A;  3PM START    REPEAT ABDOMINAL PARACENTESIS N/A 1/10/2020    Procedure: PARACENTESIS, ABDOMINAL, REPEAT;  Surgeon: Dosc Diagnostic Provider;  [facility];  Service: Radiology;  Laterality: N/A;  1130AM START    REPEAT ABDOMINAL PARACENTESIS N/A 1/20/2020    Procedure: PARACENTESIS, ABDOMINAL, REPEAT;  Surgeon: Dosc Diagnostic Provider;  [facility];  Service: Radiology;  Laterality: N/A;  1PM START    REPEAT ABDOMINAL PARACENTESIS N/A 1/31/2020    Procedure: PARACENTESIS, ABDOMINAL, REPEAT;  Surgeon: Dos Diagnostic Provider;  [facility];  Service: Radiology;  Laterality: N/A;  10AM START    REPEAT ABDOMINAL PARACENTESIS N/A 3/16/2020    Procedure: PARACENTESIS, ABDOMINAL, REPEAT;  Surgeon: Dos Diagnostic Provider;  Location: [facility] OR;  Service: Radiology;  Laterality: N/A;  10AM START    REPEAT ABDOMINAL PARACENTESIS N/A 3/30/2020    Procedure: PARACENTESIS, ABDOMINAL, REPEAT;  Surgeon: Dos Diagnostic Provider;  [facility];  Service: Radiology;  Laterality: N/A;    REPEAT ABDOMINAL PARACENTESIS N/A 4/9/2020    Procedure: PARACENTESIS, ABDOMINAL, REPEAT;  Surgeon: Dosc Diagnostic Provider;  Location: [facility] OR;  Service: Radiology;  Laterality: N/A;  1130AM START    REPEAT ABDOMINAL PARACENTESIS N/A 5/8/2020    Procedure: PARACENTESIS, ABDOMINAL, REPEAT;  Surgeon: Dosc Diagnostic Provider;  [facility];  Service: Radiology;  Laterality: N/A;  9AM START    REPEAT ABDOMINAL PARACENTESIS N/A 5/21/2020    Procedure: PARACENTESIS, ABDOMINAL, REPEAT;  Surgeon: Dosc Diagnostic Provider;  [facility];  Service: Radiology;  Laterality: N/A;  10AM START    REPEAT ABDOMINAL PARACENTESIS N/A 6/5/2020    Procedure: PARACENTESIS, ABDOMINAL,  REPEAT;  Surgeon: Dosc Diagnostic Provider;  Location: [facility] OR;  Service: Radiology;  Laterality: N/A;  NOON START    REPEAT ABDOMINAL PARACENTESIS N/A 7/10/2020    Procedure: PARACENTESIS, ABDOMINAL, REPEAT;  Surgeon: Dosc Diagnostic Provider;  [facility];  Service: Radiology;  Laterality: N/A;  1PM START    REPEAT ABDOMINAL PARACENTESIS N/A 8/20/2020    Procedure: PARACENTESIS, ABDOMINAL, REPEAT;  Surgeon: Dosc Diagnostic Provider;  Location: [facility] OR;  Service: Radiology;  Laterality: N/A;  1PM START    REPEAT ABDOMINAL PARACENTESIS N/A 9/4/2020    Procedure: PARACENTESIS, ABDOMINAL, REPEAT;  Surgeon: Dosc Diagnostic Provider;  Location: [facility] OR;  Service: Radiology;  Laterality: N/A;  11 AM START    REPEAT ABDOMINAL PARACENTESIS N/A 9/16/2020    Procedure: PARACENTESIS, ABDOMINAL, REPEAT;  Surgeon: Dosc Diagnostic Provider;  Location: [facility] OR;  Service: Radiology;  Laterality: N/A;  START 2:00 P.M.    REPEAT ABDOMINAL PARACENTESIS N/A 9/28/2020    Procedure: PARACENTESIS, ABDOMINAL, REPEAT;  Surgeon: Dosc Diagnostic Provider;  Location: [facility] OR;  Service: Radiology;  Laterality: N/A;  1 P.M. START    REPEAT ABDOMINAL PARACENTESIS N/A 11/3/2020    Procedure: PARACENTESIS, ABDOMINAL, REPEAT;  Surgeon: Dos Diagnostic Provider;  Location: [facility] OR;  Service: Radiology;  Laterality: N/A;  11AM START    REPEAT ABDOMINAL PARACENTESIS N/A 11/13/2020    Procedure: PARACENTESIS, ABDOMINAL, REPEAT;  Surgeon: Dos Diagnostic Provider;  Location: [facility] OR;  Service: Radiology;  Laterality: N/A;  12PM START    REPEAT ABDOMINAL PARACENTESIS N/A 11/23/2020    Procedure: PARACENTESIS, ABDOMINAL, REPEAT;  Surgeon: Dosc Diagnostic Provider;  Location: [facility] OR;  Service: Radiology;  Laterality: N/A;    REVISION OF PROCEDURE INVOLVING GLAUCOMA DRAINAGE DEVICE Left 10/2/2019    EXTRUDING BAERVELDT /WITH PERICARDIAL PATCH GRAFT ()    Right foot surgery  10/2014    TOE AMPUTATION Right     first and second     TOE AMPUTATION Left 2018    Procedure: AMPUTATION, TOES  2-5;  Surgeon: Mitch Chan MD;  Location: [facility] OR;  Service: Vascular;  Laterality: Left;    TOE AMPUTATION Left 2018    Procedure: AMPUTATION, TOE;  Surgeon: Mitch Chan MD;  Location: [facility] OR;  Service: Vascular;  Laterality: Left;  left great toe    TONSILLECTOMY       Family History   Problem Relation Age of Onset    Diabetes Mother     Heart disease Brother     No Known Problems Father     No Known Problems Sister     No Known Problems Maternal Aunt     No Known Problems Maternal Uncle     No Known Problems Paternal Aunt     No Known Problems Paternal Uncle     No Known Problems Maternal Grandmother     No Known Problems Maternal Grandfather     No Known Problems Paternal Grandmother     No Known Problems Paternal Grandfather     Anemia Neg Hx     Arrhythmia Neg Hx     Asthma Neg Hx     Clotting disorder Neg Hx     Fainting Neg Hx     Heart attack Neg Hx     Heart failure Neg Hx     Hyperlipidemia Neg Hx     Hypertension Neg Hx     Stroke Neg Hx     Atrial Septal Defect Neg Hx     Amblyopia Neg Hx     Blindness Neg Hx     Glaucoma Neg Hx     Macular degeneration Neg Hx     Retinal detachment Neg Hx     Strabismus Neg Hx      Social History     Tobacco Use    Smoking status: Former Smoker     Packs/day: 1.00     Years: 3.00     Pack years: 3.00     Types: Cigarettes     Quit date: 1984     Years since quittin.4    Smokeless tobacco: Never Used   Substance Use Topics    Alcohol use: Not Currently     Alcohol/week: 1.0 standard drinks     Types: 1 Cans of beer per week     Comment: rarely    Drug use: No        Review of Systems:  ROS not obtained.    OBJECTIVE:     Vital Signs (Most Recent):  BP: 120/66 (20 1130)    Physical Exam:  Lungs: No respiratory distress  Cardiac: regular rate and rhythm  Abdomen: Distended    Laboratory  CBC:   Lab Results   Component Value  Date/Time    WBC 6.66 10/27/2020 10:00 AM    RBC 4.00 (L) 10/27/2020 10:00 AM    HGB 11.5 (L) 10/27/2020 10:00 AM    HCT 36.0 (L) 10/27/2020 10:00 AM     10/27/2020 10:00 AM    MCV 90 10/27/2020 10:00 AM    MCH 28.8 10/27/2020 10:00 AM    MCHC 31.9 (L) 10/27/2020 10:00 AM     Coagulation:   Lab Results   Component Value Date/Time    INR 1.1 05/31/2019 10:25 AM    APTT 28.3 05/31/2019 10:25 AM         ASSESSMENT/PLAN:     Recurrent ascites.    Patient will undergo paracentesis.    Sedation Plan: Lidocaine local only

## 2020-12-01 NOTE — PROCEDURES
[facility]  Interventional Radiology  Procedure - Outpatient    Date: 12/01/2020 Time: 12:05 PM    Pre-Op Diagnosis: Recurrent ascites    Post-Op Diagnosis: same    Procedure Performed by: Matheus Marinelli MD    Assistant: none    Procedure: U/S guided therapeutic paracentesis    Specimen/Tissue Removed: 5800 mL of clear yellow asitic fluid    Estimated Blood Loss: Less than 5 mL    Procedure Note/Findings: U/S guided paracentesis via RLQ approach with 5 Divehi centesis needle with 5800 mL of clear yellow fluid removed. No immediate post-procedure complications noted.            Please refer to dictated report for additional details.

## 2020-12-01 NOTE — TELEPHONE ENCOUNTER
He was to get IV antibiotics for 2 weeks and I was supposed to see him in clinic to re-assess. Clarify when they finally placed the central line and started the antibiotic.  He needs to follow up with me to examine his foot wounds sometime this week.

## 2020-12-01 NOTE — TELEPHONE ENCOUNTER
----- Message from Aleksandra Mcclendon MA sent at 11/30/2020 11:49 AM CST -----  Contact: [facility]  Infusion company is asking about and end of care date   ----- Message -----  From: Nba Moyer  Sent: 11/30/2020   9:41 AM CST  To: Jenna Metcalf Staff    Please call N[facility] at [phone]    Fax# [phone]    Questions regarding the infusion end of care orders    Thank you

## 2020-12-01 NOTE — DISCHARGE SUMMARY
[facility]  Discharge Summary      Radiology Discharge Summary      Admit date: 12/1/2020 11:00 AM  Discharge date: December 1, 2020    Instructions Given to patient: YesVerbal    Diet: Regular    Activity:NO Restrictions    Medications on discharge (List): Refer to Discharge Medication List    Hospital Course: U/S guided paracentesis    Description of Condition on Discharge: stable    Discharge Disposition: Home    Discharge Diagnosis: Recurrent ascites    Follow Up/Patient Instructions:     Medications:  Reconciled Home Medications:      Medication List      CONTINUE taking these medications    allopurinoL 300 MG tablet  Commonly known as: ZYLOPRIM  Take 1 tablet (300 mg total) by mouth once daily.     aspirin 81 MG EC tablet  Commonly known as: ECOTRIN  Take 81 mg by mouth once daily.     bisacodyL 5 mg EC tablet  Commonly known as: DULCOLAX  Take 5 mg by mouth daily as needed for Constipation.     brimonidine 0.2% 0.2 % Drop  Commonly known as: ALPHAGAN  Place 1 drop into both eyes 3 (three) times daily.     carvediloL 3.125 MG tablet  Commonly known as: COREG  TAKE 1 TABLET(3.125 MG) BY MOUTH TWICE DAILY     coenzyme Q10 100 mg capsule  Take 100 mg by mouth every morning.     dorzolamide-timolol 2-0.5% 22.3-6.8 mg/mL ophthalmic solution  Commonly known as: COSOPT  Place 1 drop into both eyes 3 (three) times daily.     ezetimibe 10 mg tablet  Commonly known as: ZETIA  Take 1 tablet (10 mg total) by mouth once daily.     fish oil-omega-3 fatty acids 300-1,000 mg capsule  Take 1 capsule by mouth 2 (two) times daily.     gabapentin 100 MG capsule  Commonly known as: NEURONTIN  TAKE 2 CAPSULES(200 MG) BY MOUTH EVERY EVENING     HYDROcodone-acetaminophen 7.5-325 mg per tablet  Commonly known as: NORCO  Take 1 tablet by mouth every 8 (eight) hours as needed for Pain.     LANTUS SOLOSTAR U-100 INSULIN glargine 100 units/mL (3mL) SubQ pen  Generic drug: insulin  Inject 35 units once daily    "  multivitamin Tab  Take 1 tablet by mouth every morning.     * oxyCODONE 5 MG immediate release tablet  Commonly known as: ROXICODONE  Take 1 tablet (5 mg total) by mouth every 6 (six) hours as needed for Pain.     * oxyCODONE 5 MG immediate release tablet  Commonly known as: ROXICODONE  Take 1 tablet (5 mg total) by mouth every 6 (six) hours as needed for Pain.     pen needle, diabetic 32 gauge x 5/32" Ndle  Commonly known as: BD ULTRA-FINE AMADOR PEN NEEDLE  1 Device by Misc.(Non-Drug; Combo Route) route 2 (two) times a day.     SANTYL ointment  Generic drug: collagenase  Apply topically once daily.     torsemide 20 MG Tab  Commonly known as: DEMADEX  Take 4 tablets (80 mg total) by mouth 2 (two) times daily.     XULTOPHY 100/3.6 100 unit-3.6 mg /mL (3 mL) Inpn  Generic drug: insulin degludec-liraglutide  Inject 35 Units into the skin once daily.         * This list has 2 medication(s) that are the same as other medications prescribed for you. Read the directions carefully, and ask your doctor or other care provider to review them with you.              Discharge Procedure Orders   Diet general     Call MD for:  redness, tenderness, or signs of infection (pain, swelling, redness, odor or green/yellow discharge around incision site)     Call MD for:  temperature >100.4     Call MD for:  severe uncontrolled pain     Remove dressing in 24 hours     Activity as tolerated       "

## 2020-12-02 ENCOUNTER — TELEPHONE (OUTPATIENT)
Dept: INFECTIOUS DISEASES | Facility: CLINIC | Age: 60
End: 2020-12-02

## 2020-12-02 ENCOUNTER — LAB VISIT (OUTPATIENT)
Dept: LAB | Facility: HOSPITAL | Age: 60
End: 2020-12-02
Attending: NURSE PRACTITIONER
Payer: MEDICAID

## 2020-12-02 DIAGNOSIS — E11.42 DM TYPE 2 WITH DIABETIC PERIPHERAL NEUROPATHY: ICD-10-CM

## 2020-12-02 PROCEDURE — 36415 COLL VENOUS BLD VENIPUNCTURE: CPT | Mod: PN

## 2020-12-02 PROCEDURE — 83036 HEMOGLOBIN GLYCOSYLATED A1C: CPT

## 2020-12-02 NOTE — TELEPHONE ENCOUNTER
Spoke with Nahomy at [facility] and informed she will get an update about the pt EOC

## 2020-12-02 NOTE — TELEPHONE ENCOUNTER
----- Message from Matt Larios sent at 12/2/2020  3:04 PM CST -----  [facility]  [facility] is asking for a call back in regards to questions she has and the discharge orders for the pt     Contact info- [phone]

## 2020-12-03 ENCOUNTER — OFFICE VISIT (OUTPATIENT)
Dept: INFECTIOUS DISEASES | Facility: CLINIC | Age: 60
End: 2020-12-03
Payer: MEDICAID

## 2020-12-03 ENCOUNTER — INFUSION (OUTPATIENT)
Dept: INFECTIOUS DISEASES | Facility: HOSPITAL | Age: 60
End: 2020-12-03
Attending: INTERNAL MEDICINE
Payer: MEDICAID

## 2020-12-03 VITALS
TEMPERATURE: 98 F | BODY MASS INDEX: 30.36 KG/M2 | DIASTOLIC BLOOD PRESSURE: 56 MMHG | WEIGHT: 212.06 LBS | HEIGHT: 70 IN | SYSTOLIC BLOOD PRESSURE: 95 MMHG | HEART RATE: 69 BPM

## 2020-12-03 DIAGNOSIS — L97.515 ULCER OF RIGHT FOOT WITH MUSCLE INVOLVEMENT WITHOUT EVIDENCE OF NECROSIS: Primary | ICD-10-CM

## 2020-12-03 LAB
ESTIMATED AVG GLUCOSE: 157 MG/DL (ref 68–131)
HBA1C MFR BLD HPLC: 7.1 % (ref 4–5.6)

## 2020-12-03 PROCEDURE — 99214 OFFICE O/P EST MOD 30 MIN: CPT | Mod: S$PBB,,, | Performed by: INTERNAL MEDICINE

## 2020-12-03 PROCEDURE — 99999 PR PBB SHADOW E&M-EST. PATIENT-LVL IV: ICD-10-PCS | Mod: PBBFAC,,, | Performed by: INTERNAL MEDICINE

## 2020-12-03 PROCEDURE — 99999 PR PBB SHADOW E&M-EST. PATIENT-LVL IV: CPT | Mod: PBBFAC,,, | Performed by: INTERNAL MEDICINE

## 2020-12-03 PROCEDURE — 99214 PR OFFICE/OUTPT VISIT, EST, LEVL IV, 30-39 MIN: ICD-10-PCS | Mod: S$PBB,,, | Performed by: INTERNAL MEDICINE

## 2020-12-03 PROCEDURE — 99214 OFFICE O/P EST MOD 30 MIN: CPT | Mod: PBBFAC | Performed by: INTERNAL MEDICINE

## 2020-12-03 NOTE — PROGRESS NOTES
Subjective:      Patient ID: Marvin Ray is a 60 y.o. male.    Chief Complaint:Follow-up      History of Present Illness    {ID HPI BLOCKS:80954}    Review of Systems   Constitution: Negative for chills, decreased appetite, fever, malaise/fatigue, night sweats, weight gain and weight loss.   HENT: Negative for congestion, ear pain, hearing loss, hoarse voice, sore throat and tinnitus.    Eyes: Negative for blurred vision, redness and visual disturbance.   Cardiovascular: Negative for chest pain, leg swelling and palpitations.   Respiratory: Negative for cough, hemoptysis, shortness of breath and sputum production.    Hematologic/Lymphatic: Negative for adenopathy. Does not bruise/bleed easily.   Skin: Negative for dry skin, itching, rash and suspicious lesions.   Musculoskeletal: Negative for back pain, joint pain, myalgias and neck pain.   Gastrointestinal: Negative for abdominal pain, constipation, diarrhea, heartburn, nausea and vomiting.   Genitourinary: Negative for dysuria, flank pain, frequency, hematuria, hesitancy and urgency.   Neurological: Negative for dizziness, headaches, numbness, paresthesias and weakness.   Psychiatric/Behavioral: Negative for depression and memory loss. The patient does not have insomnia and is not nervous/anxious.      Objective:   Physical Exam  Assessment:       No diagnosis found.      Plan:       ***

## 2020-12-03 NOTE — PROGRESS NOTES
Pt arrived for Midline removal to E. No complications noted to insertion site. Wrapped with Vaseline gauze, 2x2 and coban. Tolerated procedure well. Left in Nad.

## 2020-12-04 ENCOUNTER — HOSPITAL ENCOUNTER (OUTPATIENT)
Dept: WOUND CARE | Facility: HOSPITAL | Age: 60
Discharge: HOME | End: 2020-12-04
Attending: PODIATRIST
Payer: MEDICAID

## 2020-12-04 VITALS — SYSTOLIC BLOOD PRESSURE: 115 MMHG | HEART RATE: 82 BPM | DIASTOLIC BLOOD PRESSURE: 60 MMHG | TEMPERATURE: 97 F

## 2020-12-04 DIAGNOSIS — I83.009 VENOUS STASIS ULCER WITH EDEMA OF LOWER LEG: ICD-10-CM

## 2020-12-04 DIAGNOSIS — E11.621 TYPE 2 DIABETES MELLITUS WITH RIGHT DIABETIC FOOT ULCER: ICD-10-CM

## 2020-12-04 DIAGNOSIS — L97.519 TYPE 2 DIABETES MELLITUS WITH RIGHT DIABETIC FOOT ULCER: ICD-10-CM

## 2020-12-04 DIAGNOSIS — R60.9 VENOUS STASIS ULCER WITH EDEMA OF LOWER LEG: ICD-10-CM

## 2020-12-04 DIAGNOSIS — L97.529 TYPE 2 DIABETES MELLITUS WITH LEFT DIABETIC FOOT ULCER: ICD-10-CM

## 2020-12-04 DIAGNOSIS — I70.233 ATHEROSCLEROSIS OF NATIVE ARTERY OF RIGHT LOWER EXTREMITY WITH ULCERATION OF ANKLE: ICD-10-CM

## 2020-12-04 DIAGNOSIS — I83.899 VENOUS STASIS ULCER WITH EDEMA OF LOWER LEG: ICD-10-CM

## 2020-12-04 DIAGNOSIS — I73.9 PVD (PERIPHERAL VASCULAR DISEASE): Chronic | ICD-10-CM

## 2020-12-04 DIAGNOSIS — L97.909 VENOUS STASIS ULCER WITH EDEMA OF LOWER LEG: ICD-10-CM

## 2020-12-04 DIAGNOSIS — E11.621 TYPE 2 DIABETES MELLITUS WITH LEFT DIABETIC FOOT ULCER: ICD-10-CM

## 2020-12-04 DIAGNOSIS — I70.239 ATHEROSCLEROSIS OF NATIVE ARTERY OF RIGHT LEG WITH ULCERATION: Primary | ICD-10-CM

## 2020-12-04 PROCEDURE — 99213 PR OFFICE/OUTPT VISIT, EST, LEVL III, 20-29 MIN: ICD-10-PCS | Mod: ,,, | Performed by: PODIATRIST

## 2020-12-04 PROCEDURE — 99213 OFFICE O/P EST LOW 20 MIN: CPT | Mod: ,,, | Performed by: PODIATRIST

## 2020-12-04 PROCEDURE — 99215 OFFICE O/P EST HI 40 MIN: CPT | Performed by: PODIATRIST

## 2020-12-04 NOTE — PROGRESS NOTES
[facility]                Progress Note    Subjective:       Patient ID: Marvin Ray is a 60 y.o. male.    Chief Complaint: No chief complaint on file.    Patient transferred from wheelchair to exam bed without assistance. Pain 3/10 to Right Heel. All wounds measuring smaller. Reduced undermining to posterior heel, however bone remains exposed with reduction of avascular slough to Right Heel with continued daily santyl/antibiotic powder use. Left Foot wounds smaller this week with change in therapy to iodosorb last week. Drainage reduced to all areas per patient's sister who performs dressing changes. Right Shin almost healed. Right 3rd toe without bone exposure. Right Medial Malleolus with quality granulation tissue within wound bed. Will continue with iodosorb on left foot/right malleolus/shin QOD and antibiotic powder plus santyl to right heel Daily. No new concerns. Swelling remains controlled to BLE at this time. Patient no longer on IV antibiotics. Right PICC line already removed.       Review of Systems   Constitutional: Negative.  Negative for activity change, appetite change, chills, fatigue and fever.   HENT: Negative.    Eyes: Negative.    Respiratory: Negative.  Negative for cough and shortness of breath.    Cardiovascular: Positive for leg swelling. Negative for chest pain.   Gastrointestinal: Negative for abdominal pain, anal bleeding, blood in stool, constipation, diarrhea, nausea and vomiting.   Musculoskeletal: Positive for joint swelling and myalgias.   Skin: Positive for wound.   Neurological: Positive for numbness. Negative for weakness.        + paresthesia    Psychiatric/Behavioral: Negative.          Objective:        Physical Exam  Vitals signs reviewed.   Constitutional:       Appearance: Normal appearance.   HENT:      Head: Normocephalic and atraumatic.      Nose: Nose normal.      Mouth/Throat:      Mouth: Mucous membranes are moist.       Pharynx: Oropharynx is clear.   Eyes:      Extraocular Movements: Extraocular movements intact.      Pupils: Pupils are equal, round, and reactive to light.   Neck:      Musculoskeletal: Normal range of motion and neck supple.   Cardiovascular:      Rate and Rhythm: Normal rate and regular rhythm.   Pulmonary:      Effort: Pulmonary effort is normal. No respiratory distress.      Breath sounds: No wheezing.   Abdominal:      General: There is distension.      Palpations: Abdomen is soft.      Tenderness: There is no abdominal tenderness. There is no guarding.   Musculoskeletal:      Right lower leg: Edema present.      Left lower leg: Edema present.      Comments: Fat pad atrophy to heels and met heads bilateral     Skin:     General: Skin is warm and dry.      Findings: Wound (see description) present.   Neurological:      Mental Status: He is alert and oriented to person, place, and time.      Sensory: No sensory deficit.         Vitals:    12/04/20 0912   BP: 115/60   Pulse: 82   Temp: 97.3 °F (36.3 °C)       Assessment:           ICD-10-CM ICD-9-CM   1. Atherosclerosis of native artery of right leg with ulceration  I70.239 440.23     707.9   2. Venous stasis ulcer with edema of lower leg  I83.009 454.0    I83.899 782.3    L97.909     R60.9    3. Atherosclerosis of native artery of right lower extremity with ulceration of ankle  I70.233 440.23     707.13   4. Type 2 diabetes mellitus with right diabetic foot ulcer  E11.621 250.80    L97.519 707.15   5. Type 2 diabetes mellitus with left diabetic foot ulcer  E11.621 250.80    L97.529 707.15   6. PVD (peripheral vascular disease)  I73.9 443.9            Wound 11/02/18 Diabetic Ulcer Left medial Foot (Active)   11/02/18     Pre-existing: Yes   Primary Wound Type: Diabetic ulc   Side: Left   Orientation: medial   Location: Foot   Wound Number (optional):    Ankle-Brachial Index:    Pulses:    Removal Indication and Assessment:    Wound Outcome:    (Retired)  Wound Type:    (Retired) Wound Length (cm):    (Retired) Wound Width (cm):    (Retired) Depth (cm):    Wound Description (Comments):    Removal Indications:    Wound Image   12/04/20 0900   Wound WDL ex 12/04/20 0900   Dressing Appearance Dry;Intact 12/04/20 0900   Drainage Amount Scant 12/04/20 0900   Appearance Pink;Red;Granulating 12/04/20 0900   Tissue loss description Full thickness 12/04/20 0900   Red (%), Wound Tissue Color 100 % 12/04/20 0900   Periwound Area Scar tissue 12/04/20 0900   Wound Edges Open 12/04/20 0900   Wound Length (cm) 5.5 cm 12/04/20 0900   Wound Width (cm) 5.7 cm 12/04/20 0900   Wound Depth (cm) 0.2 cm 12/04/20 0900   Wound Volume (cm^3) 6.27 cm^3 12/04/20 0900   Wound Surface Area (cm^2) 31.35 cm^2 12/04/20 0900   Care Cleansed with:;Soap and water;Sterile normal saline 12/04/20 0900   Dressing Changed 12/04/20 0900   Periwound Care Skin barrier film applied 12/04/20 0900   Off Loading Football dressing 12/04/20 0900            Wound 05/08/20 1015 Diabetic Ulcer Left distal Foot (Active)   05/08/20 1015    Pre-existing: Yes   Primary Wound Type: Diabetic ulc   Side: Left   Orientation: distal   Location: Foot   Wound Number (optional):    Ankle-Brachial Index:    Pulses:    Removal Indication and Assessment:    Wound Outcome:    (Retired) Wound Type:    (Retired) Wound Length (cm):    (Retired) Wound Width (cm):    (Retired) Depth (cm):    Wound Description (Comments):    Removal Indications:    Wound Image   12/04/20 0900   Wound WDL ex 12/04/20 0900   Dressing Appearance Dry;Intact 12/04/20 0900   Drainage Amount Small 12/04/20 0900   Drainage Characteristics/Odor Serosanguineous 12/04/20 0900   Appearance Pink;Red;Granulating 12/04/20 0900   Tissue loss description Full thickness 12/04/20 0900   Red (%), Wound Tissue Color 100 % 12/04/20 0900   Periwound Area Scar tissue;Intact 12/04/20 0900   Wound Edges Open 12/04/20 0900   Wound Length (cm) 2.9 cm 12/04/20 0900   Wound Width (cm)  5.3 cm 12/04/20 0900   Wound Depth (cm) 0.2 cm 12/04/20 0900   Wound Volume (cm^3) 3.07 cm^3 12/04/20 0900   Wound Surface Area (cm^2) 15.37 cm^2 12/04/20 0900   Care Cleansed with:;Soap and water;Sterile normal saline 12/04/20 0900   Dressing Changed 12/04/20 0900   Periwound Care Skin barrier film applied 12/04/20 0900   Off Loading Football dressing 12/04/20 0900            Wound 08/11/20 1051 Diabetic Ulcer Right medial Malleolus/Ankle (Active)   08/11/20 1051    Pre-existing: No   Primary Wound Type: Diabetic ulc   Side: Right   Orientation: medial   Location: Malleolus/Ankle   Wound Number (optional):    Ankle-Brachial Index:    Pulses:    Removal Indication and Assessment:    Wound Outcome:    (Retired) Wound Type:    (Retired) Wound Length (cm):    (Retired) Wound Width (cm):    (Retired) Depth (cm):    Wound Description (Comments):    Removal Indications:    Wound Image   12/04/20 0900   Wound WDL ex 12/04/20 0900   Dressing Appearance Dry;Intact 12/04/20 0900   Drainage Amount Scant 12/04/20 0900   Appearance Pink 12/04/20 0900   Tissue loss description Partial thickness 12/04/20 0900   Red (%), Wound Tissue Color 100 % 12/04/20 0900   Periwound Area Intact 12/04/20 0900   Wound Length (cm) 1.4 cm 12/04/20 0900   Wound Width (cm) 1.2 cm 12/04/20 0900   Wound Depth (cm) 0.2 cm 12/04/20 0900   Wound Volume (cm^3) 0.34 cm^3 12/04/20 0900   Wound Surface Area (cm^2) 1.68 cm^2 12/04/20 0900   Care Cleansed with:;Soap and water;Sterile normal saline 12/04/20 0900   Dressing Changed 12/04/20 0900   Periwound Care Skin barrier film applied 12/04/20 0900            Wound 11/10/20 1030 Diabetic Ulcer Right lateral Foot (Active)   11/10/20 1030    Pre-existing: Yes   Primary Wound Type: Diabetic ulc   Side: Right   Orientation: lateral   Location: Foot   Wound Number (optional):    Ankle-Brachial Index:    Pulses:    Removal Indication and Assessment:    Wound Outcome:    (Retired) Wound Type:    (Retired) Wound  Length (cm):    (Retired) Wound Width (cm):    (Retired) Depth (cm):    Wound Description (Comments):    Removal Indications:    Wound Image   12/04/20 0900   Wound WDL WDL 12/04/20 0900   Drainage Amount None 12/04/20 0900   Appearance Epithelialization 12/04/20 0900   Tissue loss description Not applicable 12/04/20 0900   Wound Edges Rolled/closed 12/04/20 0900   Wound Length (cm) 0 cm 12/04/20 0900   Wound Width (cm) 0 cm 12/04/20 0900   Wound Depth (cm) 0 cm 12/04/20 0900   Wound Volume (cm^3) 0 cm^3 12/04/20 0900   Wound Surface Area (cm^2) 0 cm^2 12/04/20 0900   Care Cleansed with:;Soap and water;Sterile normal saline 12/04/20 0900   Off Loading Football dressing 12/04/20 0900            Incision/Site 06/16/20 0930 Right Toe, third anterior (Active)   06/16/20 0930   Present Prior to Hospital Arrival?: Yes   Side: Right   Location: Toe, third   Orientation: anterior   Incision Type:    Closure Method:    Additional Comments:    Removal Indication and Assessment:    Wound Outcome:    Removal Indications:    Wound Image   12/04/20 0900   Incision WDL ex 12/04/20 0900   Dressing Appearance Dry;Intact 12/04/20 0900   Drainage Amount Scant 12/04/20 0900   Drainage Characteristics/Odor Clear 12/04/20 0900   Appearance Pink 12/04/20 0900   Red (%), Wound Tissue Color 100 % 12/04/20 0900   Periwound Area Intact 12/04/20 0900   Wound Edges Defined 12/04/20 0900   Wound Length (cm) 0.1 cm 12/04/20 0900   Wound Width (cm) 0.5 cm 12/04/20 0900   Wound Depth (cm) 0.2 cm 12/04/20 0900   Wound Volume (cm^3) 0.01 cm^3 12/04/20 0900   Wound Surface Area (cm^2) 0.05 cm^2 12/04/20 0900   Care Cleansed with:;Soap and water;Sterile normal saline 12/04/20 0900   Dressing Changed 12/04/20 0900   Off Loading Off loading shoe 12/04/20 0900   Dressing Change Due 12/11/20 12/04/20 0900            Incision/Site 11/09/20 1240 Right Leg (Active)   11/09/20 1240   Present Prior to Hospital Arrival?:    Side: Right   Location: Leg    Orientation:    Incision Type:    Closure Method:    Additional Comments:    Removal Indication and Assessment:    Wound Outcome:    Removal Indications:    Wound Image   12/04/20 0900   Incision WDL ex 12/04/20 0900   Dressing Appearance Dried drainage 12/04/20 0900   Drainage Amount Scant 12/04/20 0900   Drainage Characteristics/Odor Serosanguineous 12/04/20 0900   Appearance Red;Pink 12/04/20 0900   Red (%), Wound Tissue Color 100 % 12/04/20 0900   Wound Edges Open 12/04/20 0900   Wound Length (cm) 0.2 cm 12/04/20 0900   Wound Width (cm) 0.4 cm 12/04/20 0900   Wound Depth (cm) 0.2 cm 12/04/20 0900   Wound Volume (cm^3) 0.02 cm^3 12/04/20 0900   Wound Surface Area (cm^2) 0.08 cm^2 12/04/20 0900   Care Cleansed with:;Soap and water;Sterile normal saline 12/04/20 0900   Dressing Changed 12/04/20 0900   Periwound Care Skin barrier film applied 12/04/20 0900            Incision/Site 11/18/20 1458 Right Heel (Active)   11/18/20 1458   Present Prior to Hospital Arrival?:    Side: Right   Location: Heel   Orientation:    Incision Type:    Closure Method:    Additional Comments:    Removal Indication and Assessment:    Wound Outcome:    Removal Indications:    Wound Image    12/04/20 0900   Incision WDL ex 12/04/20 0900   Dressing Appearance Dry;Intact 12/04/20 0900   Drainage Amount Scant 12/04/20 0900   Drainage Characteristics/Odor Clear 12/04/20 0900   Red (%), Wound Tissue Color 10 % 12/04/20 0900   Yellow (%), Wound Tissue Color 90 % 12/04/20 0900   Periwound Area Intact 12/04/20 0900   Wound Edges Defined 12/04/20 0900   Wound Length (cm) 9.2 cm 12/04/20 0900   Wound Width (cm) 8.7 cm 12/04/20 0900   Wound Depth (cm) 0.9 cm 12/04/20 0900   Wound Volume (cm^3) 72.04 cm^3 12/04/20 0900   Wound Surface Area (cm^2) 80.04 cm^2 12/04/20 0900   Undermining (depth (cm)/location) 0.9 (cm) at 6 o'clock 12/04/20 0900   Care Cleansed with:;Antimicrobial agent;Sterile normal saline 12/04/20 0900   Dressing Changed 12/04/20  0900   Off Loading Off loading shoe 12/04/20 0900   Dressing Change Due 12/11/20 12/04/20 0900           Plan:              Greater than 50% of this visit spent on counseling and coordination of care.    Education about the prevention of limb loss.    Discussed wound healing cycle, skin integrity, ways to care for skin.Counseled patient on the effects of PAD and high blood glucose on healing. He verbalizes understanding that it can increase the chances of delayed healing and this prolonged exposure leads to infection or progression of infection which subsequently can result in loss of limb.    The wound is cleansed of foreign material as much as possible and the base inspected for bone or abscess.     Patient is s/p debridement by vascular surgery.  New areas of granulation noted however dense fibrin remains. Debridement deferred to the right heel, I am of the belief that if I were to debride all the fibrin is tissue of the heel his calcaneus would be exposed. I believe patient will benefit from application of skin substitute.  Will attempt to acquire in [facility].  In the meantime will continue with [facility] for debridement.    Left foot seems to be improving however right is stagnate.  He would benefit from bone biopsy and graft placement +/- wound VAC    Patient also has exposed bone and joint of the right 3rd digit.     He is recommended to float heels at all times    Abx course per ID completed     Short-term goals include maintaining good offloading and minimizing bioburden, promoting granulation and epithelialization to healing.  Long-term goals include keeping the wound healed by good offloading and medical management under the direction of internist.        Orders Placed This Encounter   Procedures    Change dressing     Right Malleolus, Right Shin, Left Medial Foot, Left Distal Foot: clean with saline. Apply cavilon periwound. Iodosorb to wound beds. Cover Right malleolus and Right shin with foam border and Left  foot with ABD, kerlix, stockinet.   Right Heel: clean with saline. Apply mixed santyl/antibiotic powder to wound with ABD, kerlix, stockinet changed per sister daily.   Right 3rd toe: clean with saline, antibiotic powder to wound daily per sister. Cover with cast padding/stockient.        Follow up in about 1 week (around 12/11/2020) for wound care.

## 2020-12-07 ENCOUNTER — OFFICE VISIT (OUTPATIENT)
Dept: VASCULAR SURGERY | Facility: CLINIC | Age: 60
End: 2020-12-07
Payer: MEDICAID

## 2020-12-07 VITALS
HEIGHT: 70 IN | BODY MASS INDEX: 30.36 KG/M2 | SYSTOLIC BLOOD PRESSURE: 140 MMHG | WEIGHT: 212.06 LBS | DIASTOLIC BLOOD PRESSURE: 70 MMHG

## 2020-12-07 DIAGNOSIS — I70.234 ATHEROSCLEROSIS OF NATIVE ARTERY OF RIGHT LOWER EXTREMITY WITH ULCERATION OF HEEL: Primary | ICD-10-CM

## 2020-12-07 PROCEDURE — 99215 PR OFFICE/OUTPT VISIT, EST, LEVL V, 40-54 MIN: ICD-10-PCS | Mod: 25,S$PBB,, | Performed by: SURGERY

## 2020-12-07 PROCEDURE — 97597 PR DEBRIDEMENT OPEN WOUND 20 SQ CM<: ICD-10-PCS | Mod: S$PBB,,, | Performed by: SURGERY

## 2020-12-07 PROCEDURE — 99999 PR PBB SHADOW E&M-EST. PATIENT-LVL IV: ICD-10-PCS | Mod: PBBFAC,,, | Performed by: SURGERY

## 2020-12-07 PROCEDURE — 99999 PR PBB SHADOW E&M-EST. PATIENT-LVL IV: CPT | Mod: PBBFAC,,, | Performed by: SURGERY

## 2020-12-07 PROCEDURE — 97597 DBRDMT OPN WND 1ST 20 CM/<: CPT | Mod: S$PBB,,, | Performed by: SURGERY

## 2020-12-07 PROCEDURE — 99215 OFFICE O/P EST HI 40 MIN: CPT | Mod: 25,S$PBB,, | Performed by: SURGERY

## 2020-12-07 PROCEDURE — 97597 DBRDMT OPN WND 1ST 20 CM/<: CPT | Mod: PBBFAC | Performed by: SURGERY

## 2020-12-07 PROCEDURE — 99214 OFFICE O/P EST MOD 30 MIN: CPT | Mod: PBBFAC | Performed by: SURGERY

## 2020-12-07 NOTE — PROGRESS NOTES
Date: 12/7/20    Indications: Patient is a 60 y.o. male with a right lower extremity wound.  The patient would benefit from debridement for limb salvage.    Pre-operative Diagnosis:    1.   Patient Active Problem List    Diagnosis Date Noted    Atherosclerosis of native artery of right leg with ulceration 11/18/2020    Venous stasis ulcer with edema of lower leg     Atherosclerosis of native artery of right lower extremity with ulceration of ankle 11/09/2020    Statin intolerance 10/27/2020    Abnormal EKG 10/27/2020    Type 2 diabetes mellitus with right diabetic foot ulcer 09/29/2020    Diabetes mellitus due to underlying condition with foot ulcer, without long-term current use of insulin     Diabetic ulcer of toe of right foot associated with type 2 diabetes mellitus, with bone involvement without evidence of necrosis 08/11/2020    Diabetic ulcer of right lower leg 06/18/2020    Status post abdominal paracentesis 05/08/2020    Ischemic cardiomyopathy 01/20/2020    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 10/02/2019    CKD stage 3 due to type 2 diabetes mellitus 05/01/2019    Anemia due to multiple mechanisms 05/01/2019    Persistent proteinuria 05/01/2019    Atherosclerosis of left lower extremity with ulceration of midfoot 04/30/2019    Stage 3 chronic kidney disease 03/11/2019    Subacute osteomyelitis of left foot 01/14/2019    Debility 12/25/2018    MDR Acinetobacter baumannii infection 12/20/2018    Other ascites 12/12/2018    Type 2 diabetes mellitus with left diabetic foot ulcer 10/29/2018    Hepatosplenomegaly 10/12/2018    Right wrist pain 10/11/2018    PVD (peripheral vascular disease) 10/10/2018    Neovascular glaucoma, right eye, mild stage 10/09/2018    Statin myopathy 07/27/2018    Neovascular glaucoma of left eye, severe stage 02/15/2017    Coronary artery disease involving native coronary artery of native heart without angina pectoris 05/31/2016     Essential hypertension 05/06/2016    Tophaceous gout 10/22/2015    Neovascular glaucoma of both eyes 03/20/2015    DM type 2 with diabetic peripheral neuropathy 10/22/2014    Mixed hyperlipidemia 10/22/2014    Nuclear sclerotic cataract of right eye 08/28/2014    Pseudophakia, left eye 08/28/2014    Ptosis, left eyelid 08/28/2014    Epiretinal membrane, both eyes 08/22/2014         Post-operative Diagnosis:   1. Same    Procedure: Right heel wound debridement. Wound measurement, 15 cm x 12 cm x 0.5 cm         Surgeon: Mitch Chan MD    Assistants: None    Anesthesia: None    Estimate Blood Loss:  Less than 5mL            Specimens: None               Complications:  None; patient tolerated the procedure well.     Procedure in detail: The patient was seen in the clinic room and placed in the supine position on the treatment table. Attention was directed to the ulcer which was located on the right heel. The wound was covered with dry fibrin and a mild callused rim; there was necrotic fat and muscle. No acute signs of infection were noted. The wound was non-tender. The wound was prepped with betadine. Utilizing a scissor, I debrided the wound full-thickness through skin to excise nonviable tissue inside the wound margins down to level of the bone.  The patient tolerated well. Because of a lack of sensation, anesthesia was not necessary. The wound was flushed with sterile saline. There was appropriate bleeding with debridement which was well controlled with direct pressure. The wound was then dressed with wet to dry dressing. The patient tolerated the procedure well.

## 2020-12-07 NOTE — PATIENT INSTRUCTIONS
Peripheral Artery Disease (PAD)     Peripheral artery disease (PAD) occurs when the arteries that carry blood to the limbs are narrowed or blocked. This is usually due to a buildup of a fatty substance called plaque in the walls of the arteries.  PAD most often affects the arteries in the legs. When these arteries are narrowed or blocked, blood flow to the legs is reduced. This can cause leg and foot pain and other symptoms. If severe enough, reduced blood flow to the legs can lead to tissue death (gangrene) and the loss of a toe, foot, or leg. Having PAD also makes it more likely that arteries in other body areas are blocked. For instance, arteries that carry blood to the heart or brain may be affected. This raises the chances of heart attack and stroke.  Risk factors  Certain factors can make PAD more likely. They include:  · Smoking  · Diabetes  · High blood pressure  · Unhealthy cholesterol levels  · Obesity  · Inactive lifestyle  · Older age  · Family history of PAD  Symptoms  Many people with PAD have no symptoms. If symptoms do occur, they can include:  · Pain in the muscles of the calves, thighs or hips that gets worse with activity and better with rest (intermittent claudication)  · Achy, tired, or heavy feeling in the legs  · Weakness, numbness, tingling, or loss of feeling in the legs  · Changes in skin color of the legs  · Sores on the legs and feet  · Cold leg, feet, or toes  · Pain the feet or toes even when lying down (rest pain)  Home care  PAD is a chronic (lifelong) condition. Treatment is focused on managing your condition and lowering your health risks. This may include doing the following:  · If you smoke, quit. This helps prevent further damage to your arteries and lowers your health risks. Ask your provider about medicines or products that can help you quit smoking. Also consider joining a stop-smoking program or support group.  · Be more active. This helps you lose weight and manage  problems such as high blood pressure and unhealthy cholesterol levels. Start a walking program if advised to by your provider. Your provider may also help you form a safe exercise program that is right for your needs.  · Make healthy eating changes. This includes eating less fat, salt, and sugar.  · Take medicines for high blood pressure, unhealthy cholesterol levels, and diabetes as directed.  · Have your blood pressure and cholesterol levels checked as often as directed.  · If you have diabetes, try to keep your blood sugar well controlled. Test your blood sugar as directed.  · If you are overweight, talk to your provider about forming a weight-loss plan.  · Watch for cuts, scrapes, or open sores on your feet. Poor blood flow to the feet may slow healing and increase the risk of infection from these problems.   Follow-up care  Follow up with your healthcare provider, or as advised. If imaging tests such as ultrasound were done, they will be reviewed by a doctor. You will be told the results and any new findings that may affect your care.  When to seek medical advice   Call your healthcare provider right away if any of these occur:  · Sudden severe pain in the legs or feet  · Sudden cold, pale or blue color in the legs or feet  · Weakness or numbness in the legs or feet that worsens  · Any sore or wound in the legs or feet that wont heal  · Weak pulse in your legs or feet  Know the Signs of Heart Attack and Stroke  People with PAD are at high risk for heart attack and stroke. Knowing the signs of these problems can help you protect your health and get help when you need it. Call 911 right away if you have any of the following:  Signs of a Heart Attack  · Chest discomfort, such as pain, aching, tightness, or pressure that lasts more than a few minutes, or that comes and goes  · Pain or discomfort in the arms, back, shoulders, neck, or jaw  · Shortness of breath  · Sweating (often a cold, clammy  sweat)  · Nausea  · Lightheadedness  Signs of a Stroke  · Sudden numbness or weakness of the face, arms, or legs, especially on one side  · Sudden confusion or trouble speaking or understanding  · Sudden trouble seeing in one or both eyes  · Sudden trouble walking, dizziness, or loss of balance  · Sudden, severe headache with no known cause   Date Last Reviewed: 9/21/2015  © 9714-6546 [email or web address]. [address] [place] [address], [place] [address]. All rights reserved. This information is not intended as a substitute for professional medical care. Always follow your healthcare professional's instructions.          Wound Care  Taking proper care of your wound will help it heal. Your healthcare provider may show you how to clean and dress the wound. He or she will also explain how to tell if the wound is healing normally. If you are unsure of how to take care of the wound, be sure to clarify what dressing to use and how often you should change the bandages. Here are the basic steps.     A wound that's not healing normally may be dark in color or have white streaks.   Wash your hands  Tips for washing your hands include:  · Use liquid soap and lather for 2 minutes. Scrub between your fingers and under your nails.  · Rinse with warm water, keeping your fingers pointing down.  · Use a paper towel to dry your hands and to turn off the faucet.  Remove the used dressing  Here are suggestions for removing the dressing:  · If dressing changes cause you pain, be sure to take your pain medicine as prescribed by your healthcare provider 30 minutes before dressing changes.  · Set up your supplies.  · Put on disposable gloves if youre dressing a wound for someone else or your wound is infected.  · Loosen the tape by pulling gently toward the wound.  · Gently take off the old dressing. If the dressing is stuck to the wound, moisten it with saline (if available) or clean water.  · If you have a drain or tube in the wound, be  careful not to pull on it.  · Remove the dressing 1 layer at a time and put it in a plastic bag.  · Remove your gloves.  Inspect and dress the wound  Check the wound carefully:  · Each time you change the dressing, inspect the wound carefully to be sure its healing normally by making sure your wound appears to be pink and moist, and is free of infection.    · Wash your hands again. Put on a new pair of gloves.  · Clean and dress the wound as directed by your healthcare provider or nurse. Do not put anything in the wound that is not prescribed or directed by your healthcare provider. If you have a drain or tube, be careful not to pull on it. Make sure to secure the drain or tube as well.  · Put all supplies in a plastic bag. Seal the bag and put it in the trash.  · Be sure to wash your hands again.  Call your healthcare provider  Call your healthcare provider if you see any of the following signs of a problem:  · Bleeding that soaks the dressing  · Pink fluid weeping from the wound  · Increased drainage or drainage that is yellow, yellow-green, or foul-smelling  · Increased swelling or pain, or redness or swelling in the skin around the wound  · A change in the color of the wound, or if streaks develop in a direction away from the wound  · The area between any stitches opens up  · An increase in the size of the wound  · A fever of 100.4°F (38°C) or higher, or as directed by your healthcare provider  · Chills, increased fatigue, or a loss of appetite      Date Last Reviewed: 7/30/2015  © 3966-3895 [employer]. [address] [place] [address], [place] [address]. All rights reserved. This information is not intended as a substitute for professional medical care. Always follow your healthcare professional's instructions.

## 2020-12-07 NOTE — PROGRESS NOTES
Mitch Chan MD [facility]                         12/07/2020    HPI:  Marvin Ray is a 60 y.o. male with   Patient Active Problem List   Diagnosis    Epiretinal membrane, both eyes    Nuclear sclerotic cataract of right eye    Pseudophakia, left eye    Ptosis, left eyelid    DM type 2 with diabetic peripheral neuropathy    Mixed hyperlipidemia    Neovascular glaucoma of both eyes    Tophaceous gout    Essential hypertension    Coronary artery disease involving native coronary artery of native heart without angina pectoris    Neovascular glaucoma of left eye, severe stage    Statin myopathy    Neovascular glaucoma, right eye, mild stage    PVD (peripheral vascular disease)    Right wrist pain    Hepatosplenomegaly    Type 2 diabetes mellitus with left diabetic foot ulcer    Other ascites    MDR Acinetobacter baumannii infection    Debility    Subacute osteomyelitis of left foot    Stage 3 chronic kidney disease    Atherosclerosis of left lower extremity with ulceration of midfoot    CKD stage 3 due to type 2 diabetes mellitus    Anemia due to multiple mechanisms    Persistent proteinuria    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    Ischemic cardiomyopathy    Status post abdominal paracentesis    Diabetic ulcer of right lower leg    Diabetic ulcer of toe of right foot associated with type 2 diabetes mellitus, with bone involvement without evidence of necrosis    Diabetes mellitus due to underlying condition with foot ulcer, without long-term current use of insulin    Type 2 diabetes mellitus with right diabetic foot ulcer    Statin intolerance    Abnormal EKG    Atherosclerosis of native artery of right lower extremity with ulceration of ankle    Venous stasis ulcer with edema of lower leg    Atherosclerosis of native artery of right leg with ulceration    being managed by PCP and specialists who is  here today for evaluation of L foot wound.  Seen in hospital last mo with LLE cellulitis.  Treated with Abx.  Also had paracentesis for ascites with negative w/u per family.  Patient states location is L foot occurring for 1-2 mo, worsening foot wound although improvement in edema and erythema.  Associated signs and symptoms include pain and edema.  Quality is aching and severity is 5/10.  Symptoms began 10/2018 spontaneously with blistering and severe edema.  Alleviating factors include wound care and elevation.  Worsening factors include pressure.    Tobacco use: denies    1/4/19: s/p LLE angioplasty and multiple subsequent OR and bedside debridements.  On IV Abx per culture results.  Glucose better controlled.  No fevers.  Receiving local wound care with Santyl.    1/14/19:  Cont to receive local wound care.  Pseudomonas in tissue and bone cultures multidrug resistant other than aminoglycoside; d/w Dr. Velez with high risk of ESRD with 6 weeks of aminoglycoside treatment.  No F/C.    1/31/19:  Pt without complaints.  Was started back on Bactrim.  Family doing wound care.    2/28/19: S/p L foot debridement 2/13/19.  Started on Meropenem.    3/28/19:  No complaints.  S/p paracentesis and pt feels better.    5/16/19:  S/p L peroneal and AT angioplasty, wound healing well.    9/30/19:  S/p L foot debridement 6/5/19. WV placed.    11/2019:  States wound is healing well.  Did not see Plastic Surgery clinic.  Cont wound care center and home health wound care.    2/2020: States wound nearly fully healed.  Receiving wound care daily.    5/2020:  Wounds healing.  Seeing Dr. Davis next week.    7/2020:  Doing well with L foot wound, has developed a R medial maleolus wound due to trauma    8/2020:  Wounds healing BLE.    9/2020: RLE wounds not healing well.  No F/C.    10/2020:  S/p R SFA angioplasty with a 5x60 mm Ultraverse balloon 10/6/20.  States wounds look better, receiving daily wound care.    11/2020:  S/p RLE wounds  debridement 11/6/20.  Doing well, +cultures on Abx and receiving wound care by Dr. Mcgee also at the [facility]    12/2020:  No complaints today.  Seeing Dr. Mcgee with plans for grafting to R foot wound soon.    Past Medical History:   Diagnosis Date    Arthritis     Cellulitis     CKD (chronic kidney disease), stage III     Coronary artery disease     Diabetes mellitus     Diabetic retinopathy     Diabetic ulcer of left foot     Glaucoma     Gout     Hyperlipemia     Hypertension     ICD (implantable cardioverter-defibrillator) in place 11/02/2018    Left chest    Non-pressure chronic ulcer of other part of left foot with fat layer exposed 10/23/2018    PVD (peripheral vascular disease)     Type 2 diabetes mellitus with left diabetic foot ulcer 10/29/2018    Unsteady gait     uses a wheelchair     Past Surgical History:   Procedure Laterality Date    AHMED GLAUCOMA IMPLANT Left 2011    DONE AT [facility]    AORTOGRAPHY WITH SERIALOGRAPHY Left 4/30/2019    Procedure: AORTOGRAM, WITH SERIALOGRAPHY, LEFT LOWER EXTREMITY, POSSIBLE INTERVENTION;  Surgeon: Mitch Chan MD;  Location: [facility] OR;  Service: Vascular;  Laterality: Left;  RN PRE OP 4-23-19.  NO H & P, No Cosent  [place]    AORTOGRAPHY WITH SERIALOGRAPHY Right 10/6/2020    Procedure: AORTOGRAM, WITH SERIALOGRAPHY, RIGHT LOWER EXTREMITY ANGIOGRAM, POSSIBLE INTERVENTION;  Surgeon: Mitch Chan MD;  Location: [facility] OR;  Service: Vascular;  Laterality: Right;  RN PREOP 10/1/2020--COVID NEGATIVE ON 10/5    BAERVELDT GLAUCOMA IMPLANT Left 2012    WITH CATARACT EXTRACTION//DONE AT [facility]    CARDIAC CATHETERIZATION Left 05/2016    CARDIAC DEFIBRILLATOR PLACEMENT Left 11/02/2018    CATARACT EXTRACTION W/  INTRAOCULAR LENS IMPLANT Left 2012    WITH BAERVEDT//DONE AT [facility]    CATARACT EXTRACTION W/  INTRAOCULAR LENS IMPLANT Right 09/26/2018    COMPLEX ()    CB DESTRUCTION WITH CYCLO G6 Left 02/15/2017        CYST REMOVAL       DEBRIDEMENT OF FOOT  12/28/2018    Procedure: DEBRIDEMENT, FOOT;  Surgeon: Mitch Wall MD;  Location: [facility] OR;  Service: Vascular;;    DEBRIDEMENT OF FOOT  6/5/2019    Procedure: DEBRIDEMENT, FOOT;  Surgeon: Mitch Wall MD;  Location: [facility] OR;  Service: Vascular;;    EXAMINATION UNDER ANESTHESIA Left 12/5/2018    Procedure: Exam under anesthesia, left foot debridement, washout and all other indicated procedures;  Surgeon: Mitch Wall MD;  Location: [facility] OR;  Service: Vascular;  Laterality: Left;  1030AM START  RN PREOP 12/3/2018-----AICD  SEEN BY DR JAMES 12/4    EXAMINATION UNDER ANESTHESIA Left 2/13/2019    Procedure: LEFT FOOT WOUND DEBRIDEMENT, WASHOUT AND ALL OTHER INDICATED PROCEDURES;  Surgeon: Mitch Wall MD;  Location: [facility] OR;  Service: Vascular;  Laterality: Left;  requesting 1st case start  THIS CASE 1ST PER DR WALL ON 2/1/19 @ 844AM -[place]  RN PREOP 2/6/2019  HAS CARDIAC CLEARANCE    EXAMINATION UNDER ANESTHESIA Left 12/28/2018    Procedure: Exam under anesthesia, left foot debridement, left foot washout, possible left second through fifth metatarsal resection;  Surgeon: Mitch Wall MD;  Location: [facility] OR;  Service: Vascular;  Laterality: Left;  1:00pm start per julissa @ 8:58am  RN  PHONE PRE OP 12-27-18--=Pt coming from [facility] [address] [facility]  NEED CONSENT    EXAMINATION UNDER ANESTHESIA Left 6/5/2019    Procedure: LEFT FOOT DEBRIDEMENT, WASHOUT AND ALL OTHER INDICATED PROCEDURES;  Surgeon: Mitch Wall MD;  Location: [facility] OR;  Service: Vascular;  Laterality: Left;  RN PRE OP 5-31-19------ICD------NEED CONSENT    EXAMINATION UNDER ANESTHESIA Right 11/9/2020    Procedure: RIGHT FOOT DEBRIDEMENT, WASHOUT AND ALL OTHER INDICATED PROCEDURES;  Surgeon: Mitch Wall MD;  Location: [facility] OR;  Service: Vascular;  Laterality: Right;  RN PREOP 10/27/2020, --COVID NEGATIVE ON 11/6, has cardiac clearance/[place] 10/27/2020, pt has ICD  NEEDS  ORDERS    INCISION AND DRAINAGE Left 11/14/2018    Procedure: Incision and Drainage, left lower extremity debridement, washout;  Surgeon: Mitch Chan MD;  Location: [facility] OR;  Service: Vascular;  Laterality: Left;    INCISION AND DRAINAGE Left 11/16/2018    Procedure: INCISION AND DRAINAGE;  Surgeon: Mitch Chan MD;  Location: [facility] OR;  Service: Vascular;  Laterality: Left;    INCISION AND DRAINAGE Right 11/18/2020    Procedure: Debridement and washout right lower extremity wounds;  Surgeon: Mitch Chan MD;  Location: [facility] OR 2ND FLR;  Service: Vascular;  Laterality: Right;    INTRAOCULAR PROSTHESES INSERTION Right 9/26/2018    Procedure: INSERTION, IOL PROSTHESIS;  Surgeon: Perla Cortés MD;  Location: [facility] OR 1ST FLR;  Service: Ophthalmology;  Laterality: Right;    PERITONEOCENTESIS N/A 3/1/2019    Procedure: PARACENTESIS, ABDOMINAL, INITIAL;  Surgeon: Dosc Diagnostic Provider;  Location: [facility] OR;  Service: Radiology;  Laterality: N/A;    PERITONEOCENTESIS N/A 3/25/2019    Procedure: PARACENTESIS, ABDOMINAL, INITIAL;  Surgeon: Dosc Diagnostic Provider;  Location: [facility] OR;  Service: Radiology;  Laterality: N/A;    PERITONEOCENTESIS N/A 7/22/2019    Procedure: PARACENTESIS, ABDOMINAL, INITIAL;  Surgeon: Dosc Diagnostic Provider;  Location: [facility] OR;  Service: Radiology;  Laterality: N/A;    PERITONEOCENTESIS N/A 8/16/2019    Procedure: PARACENTESIS, ABDOMINAL, INITIAL;  Surgeon: Dosc Diagnostic Provider;  Location: [facility] OR;  Service: Radiology;  Laterality: N/A;    PERITONEOCENTESIS N/A 9/26/2019    Procedure: PARACENTESIS, ABDOMINAL;  Surgeon: Dosc Diagnostic Provider;  Location: [facility] OR;  Service: Radiology;  Laterality: N/A;    PERITONEOCENTESIS N/A 10/11/2019    Procedure: PARACENTESIS, ABDOMINAL;  Surgeon: Dosc Diagnostic Provider;  Location: [facility] OR;  Service: Radiology;  Laterality: N/A;    PERITONEOCENTESIS N/A 10/31/2019    Procedure: PARACENTESIS, ABDOMINAL;   Surgeon: Dosc Diagnostic Provider;  Location: [facility] OR;  Service: Radiology;  Laterality: N/A;    PERITONEOCENTESIS N/A 11/18/2019    Procedure: PARACENTESIS, ABDOMINAL;  Surgeon: Dosc Diagnostic Provider;  Location: [facility] OR;  Service: Radiology;  Laterality: N/A;    PERITONEOCENTESIS N/A 12/16/2019    Procedure: PARACENTESIS, ABDOMINAL;  Surgeon: Dosc Diagnostic Provider;  Location: [facility] OR;  Service: Radiology;  Laterality: N/A;  2PM START    PERITONEOCENTESIS N/A 2/7/2020    Procedure: PARACENTESIS, ABDOMINAL;  Surgeon: Dosc Diagnostic Provider;  Location: [facility] OR;  Service: Radiology;  Laterality: N/A;  REQUESTED 10:30A START    PERITONEOCENTESIS N/A 2/19/2020    Procedure: PARACENTESIS, ABDOMINAL;  Surgeon: Dosc Diagnostic Provider;  Location: [facility] OR;  Service: Radiology;  Laterality: N/A;    PERITONEOCENTESIS N/A 2/28/2020    Procedure: PARACENTESIS, ABDOMINAL;  Surgeon: Dosc Diagnostic Provider;  Location: [facility] OR;  Service: Radiology;  Laterality: N/A;  REQUESTED 10AM START    PHACOEMULSIFICATION OF CATARACT Right 9/26/2018    Procedure: PHACOEMULSIFICATION, CATARACT;  Surgeon: Perla Cortés MD;  Location: [address];  Service: Ophthalmology;  Laterality: Right;    REPEAT ABDOMINAL PARACENTESIS N/A 2/11/2019    Procedure: PARACENTESIS, ABDOMINAL, REPEAT;  Surgeon: Dosc Diagnostic Provider;  Location: [facility] OR;  Service: Radiology;  Laterality: N/A;  1PM START    REPEAT ABDOMINAL PARACENTESIS N/A 9/12/2019    Procedure: PARACENTESIS, ABDOMINAL, REPEAT;  Surgeon: Dosc Diagnostic Provider;  Location: [facility] OR;  Service: Radiology;  Laterality: N/A;  9AM START    REPEAT ABDOMINAL PARACENTESIS N/A 12/2/2019    Procedure: PARACENTESIS, ABDOMINAL, REPEAT;  Surgeon: Dosc Diagnostic Provider;  Location: [facility] OR;  Service: Radiology;  Laterality: N/A;  3PM START    REPEAT ABDOMINAL PARACENTESIS N/A 1/10/2020    Procedure: PARACENTESIS, ABDOMINAL, REPEAT;  Surgeon: Dosc Diagnostic Provider;   Location: [facility] OR;  Service: Radiology;  Laterality: N/A;  1130AM START    REPEAT ABDOMINAL PARACENTESIS N/A 1/20/2020    Procedure: PARACENTESIS, ABDOMINAL, REPEAT;  Surgeon: Dosc Diagnostic Provider;  Location: [facility] OR;  Service: Radiology;  Laterality: N/A;  1PM START    REPEAT ABDOMINAL PARACENTESIS N/A 1/31/2020    Procedure: PARACENTESIS, ABDOMINAL, REPEAT;  Surgeon: Dosc Diagnostic Provider;  Location: [facility] OR;  Service: Radiology;  Laterality: N/A;  10AM START    REPEAT ABDOMINAL PARACENTESIS N/A 3/16/2020    Procedure: PARACENTESIS, ABDOMINAL, REPEAT;  Surgeon: Dos Diagnostic Provider;  Location: [facility] OR;  Service: Radiology;  Laterality: N/A;  10AM START    REPEAT ABDOMINAL PARACENTESIS N/A 3/30/2020    Procedure: PARACENTESIS, ABDOMINAL, REPEAT;  Surgeon: Dos Diagnostic Provider;  Location: [facility] OR;  Service: Radiology;  Laterality: N/A;    REPEAT ABDOMINAL PARACENTESIS N/A 4/9/2020    Procedure: PARACENTESIS, ABDOMINAL, REPEAT;  Surgeon: Dos Diagnostic Provider;  Location: [facility] OR;  Service: Radiology;  Laterality: N/A;  1130AM START    REPEAT ABDOMINAL PARACENTESIS N/A 5/8/2020    Procedure: PARACENTESIS, ABDOMINAL, REPEAT;  Surgeon: Dos Diagnostic Provider;  Location: [facility] OR;  Service: Radiology;  Laterality: N/A;  9AM START    REPEAT ABDOMINAL PARACENTESIS N/A 5/21/2020    Procedure: PARACENTESIS, ABDOMINAL, REPEAT;  Surgeon: Dosc Diagnostic Provider;  Location: [facility] OR;  Service: Radiology;  Laterality: N/A;  10AM START    REPEAT ABDOMINAL PARACENTESIS N/A 6/5/2020    Procedure: PARACENTESIS, ABDOMINAL, REPEAT;  Surgeon: Dosc Diagnostic Provider;  Location: [facility] OR;  Service: Radiology;  Laterality: N/A;  NOON START    REPEAT ABDOMINAL PARACENTESIS N/A 7/10/2020    Procedure: PARACENTESIS, ABDOMINAL, REPEAT;  Surgeon: Dosc Diagnostic Provider;  Location: [facility] OR;  Service: Radiology;  Laterality: N/A;  1PM START    REPEAT ABDOMINAL PARACENTESIS N/A 8/20/2020    Procedure: PARACENTESIS,  ABDOMINAL, REPEAT;  Surgeon: Dosc Diagnostic Provider;  Location: [facility] OR;  Service: Radiology;  Laterality: N/A;  1PM START    REPEAT ABDOMINAL PARACENTESIS N/A 9/4/2020    Procedure: PARACENTESIS, ABDOMINAL, REPEAT;  Surgeon: Dosc Diagnostic Provider;  Location: [facility] OR;  Service: Radiology;  Laterality: N/A;  11 AM START    REPEAT ABDOMINAL PARACENTESIS N/A 9/16/2020    Procedure: PARACENTESIS, ABDOMINAL, REPEAT;  Surgeon: Dosc Diagnostic Provider;  Location: [facility] OR;  Service: Radiology;  Laterality: N/A;  START 2:00 P.M.    REPEAT ABDOMINAL PARACENTESIS N/A 9/28/2020    Procedure: PARACENTESIS, ABDOMINAL, REPEAT;  Surgeon: Dosc Diagnostic Provider;  Location: [facility] OR;  Service: Radiology;  Laterality: N/A;  1 P.M. START    REPEAT ABDOMINAL PARACENTESIS N/A 11/3/2020    Procedure: PARACENTESIS, ABDOMINAL, REPEAT;  Surgeon: Dosc Diagnostic Provider;  Location: [facility] OR;  Service: Radiology;  Laterality: N/A;  11AM START    REPEAT ABDOMINAL PARACENTESIS N/A 11/13/2020    Procedure: PARACENTESIS, ABDOMINAL, REPEAT;  Surgeon: Dosc Diagnostic Provider;  Location: [facility] OR;  Service: Radiology;  Laterality: N/A;  12PM START    REPEAT ABDOMINAL PARACENTESIS N/A 11/23/2020    Procedure: PARACENTESIS, ABDOMINAL, REPEAT;  Surgeon: Dosc Diagnostic Provider;  Location: [facility] OR;  Service: Radiology;  Laterality: N/A;    REPEAT ABDOMINAL PARACENTESIS N/A 12/1/2020    Procedure: PARACENTESIS, ABDOMINAL, REPEAT;  Surgeon: Dosc Diagnostic Provider;  Location: [facility] OR;  Service: Radiology;  Laterality: N/A;  11AM START    REVISION OF PROCEDURE INVOLVING GLAUCOMA DRAINAGE DEVICE Left 10/2/2019    EXTRUDING BAERVELDT /WITH PERICARDIAL PATCH GRAFT ()    Right foot surgery  10/2014    TOE AMPUTATION Right     first and second    TOE AMPUTATION Left 11/16/2018    Procedure: AMPUTATION, TOES  2-5;  Surgeon: Mitch Chan MD;  Location: [facility] OR;  Service: Vascular;  Laterality: Left;    TOE AMPUTATION Left  2018    Procedure: AMPUTATION, TOE;  Surgeon: Mitch Chan MD;  Location: [facility];  Service: Vascular;  Laterality: Left;  left great toe    TONSILLECTOMY       Family History   Problem Relation Age of Onset    Diabetes Mother     Heart disease Brother     No Known Problems Father     No Known Problems Sister     No Known Problems Maternal Aunt     No Known Problems Maternal Uncle     No Known Problems Paternal Aunt     No Known Problems Paternal Uncle     No Known Problems Maternal Grandmother     No Known Problems Maternal Grandfather     No Known Problems Paternal Grandmother     No Known Problems Paternal Grandfather     Anemia Neg Hx     Arrhythmia Neg Hx     Asthma Neg Hx     Clotting disorder Neg Hx     Fainting Neg Hx     Heart attack Neg Hx     Heart failure Neg Hx     Hyperlipidemia Neg Hx     Hypertension Neg Hx     Stroke Neg Hx     Atrial Septal Defect Neg Hx     Amblyopia Neg Hx     Blindness Neg Hx     Glaucoma Neg Hx     Macular degeneration Neg Hx     Retinal detachment Neg Hx     Strabismus Neg Hx      Social History     Socioeconomic History    Marital status: Single     Spouse name: Not on file    Number of children: Not on file    Years of education: 14    Highest education level: Not on file   Occupational History    Not on file   Social Needs    Financial resource strain: Not on file    Food insecurity     Worry: Not on file     Inability: Not on file    Transportation needs     Medical: Not on file     Non-medical: Not on file   Tobacco Use    Smoking status: Former Smoker     Packs/day: 1.00     Years: 3.00     Pack years: 3.00     Types: Cigarettes     Quit date: 1984     Years since quittin.4    Smokeless tobacco: Never Used   Substance and Sexual Activity    Alcohol use: Not Currently     Alcohol/week: 1.0 standard drinks     Types: 1 Cans of beer per week     Comment: rarely    Drug use: No    Sexual activity: Not  Currently     Partners: Female   Lifestyle    Physical activity     Days per week: Not on file     Minutes per session: Not on file    Stress: Not on file   Relationships    Social connections     Talks on phone: Not on file     Gets together: Not on file     Attends Jehovah's witness service: Not on file     Active member of club or organization: Not on file     Attends meetings of clubs or organizations: Not on file     Relationship status: Not on file   Other Topics Concern    Not on file   Social History Narrative    Not on file       Current Outpatient Medications:     allopurinoL (ZYLOPRIM) 300 MG tablet, Take 1 tablet (300 mg total) by mouth once daily., Disp: 30 tablet, Rfl: 3    aspirin (ECOTRIN) 81 MG EC tablet, Take 81 mg by mouth once daily., Disp: , Rfl:     carvediloL (COREG) 3.125 MG tablet, TAKE 1 TABLET(3.125 MG) BY MOUTH TWICE DAILY, Disp: 180 tablet, Rfl: 1    coenzyme Q10 100 mg capsule, Take 100 mg by mouth every morning., Disp: , Rfl:     collagenase (SANTYL) ointment, Apply topically once daily., Disp: 90 g, Rfl: 0    dorzolamide-timolol 2-0.5% (COSOPT) 22.3-6.8 mg/mL ophthalmic solution, Place 1 drop into both eyes 3 (three) times daily., Disp: 1 Bottle, Rfl: 11    ezetimibe (ZETIA) 10 mg tablet, Take 1 tablet (10 mg total) by mouth once daily., Disp: 90 tablet, Rfl: 1    fish oil-omega-3 fatty acids 300-1,000 mg capsule, Take 1 capsule by mouth 2 (two) times daily., Disp: 60 capsule, Rfl: 3    gabapentin (NEURONTIN) 100 MG capsule, TAKE 2 CAPSULES(200 MG) BY MOUTH EVERY EVENING, Disp: 60 capsule, Rfl: 1    insulin (LANTUS SOLOSTAR U-100 INSULIN) glargine 100 units/mL (3mL) SubQ pen, Inject 35 units once daily, Disp: 15 mL, Rfl: 3    insulin degludec-liraglutide (XULTOPHY 100/3.6) 100 unit-3.6 mg /mL (3 mL) InPn, Inject 35 Units into the skin once daily., Disp: , Rfl:     MULTIVIT,THER IRON,CA,FA & MIN (MULTIVITAMIN) Tab, Take 1 tablet by mouth every morning. , Disp: , Rfl:      "bisacodyl (DULCOLAX) 5 mg EC tablet, Take 5 mg by mouth daily as needed for Constipation., Disp: , Rfl:     brimonidine 0.2% (ALPHAGAN) 0.2 % Drop, Place 1 drop into both eyes 3 (three) times daily. (Patient not taking: Reported on 12/7/2020), Disp: 10 mL, Rfl: 11    HYDROcodone-acetaminophen (NORCO) 7.5-325 mg per tablet, Take 1 tablet by mouth every 8 (eight) hours as needed for Pain. (Patient not taking: Reported on 12/7/2020), Disp: 30 tablet, Rfl: 0    oxyCODONE (ROXICODONE) 5 MG immediate release tablet, Take 1 tablet (5 mg total) by mouth every 6 (six) hours as needed for Pain. (Patient not taking: Reported on 12/7/2020), Disp: 16 tablet, Rfl: 0    oxyCODONE (ROXICODONE) 5 MG immediate release tablet, Take 1 tablet (5 mg total) by mouth every 6 (six) hours as needed for Pain. (Patient not taking: Reported on 12/7/2020), Disp: 20 tablet, Rfl: 0    pen needle, diabetic (BD ULTRA-FINE AMADOR PEN NEEDLE) 32 gauge x 5/32" Ndle, 1 Device by Misc.(Non-Drug; Combo Route) route 2 (two) times a day. (Patient not taking: Reported on 12/7/2020), Disp: 100 each, Rfl: 11    torsemide (DEMADEX) 20 MG Tab, Take 4 tablets (80 mg total) by mouth 2 (two) times daily., Disp: 240 tablet, Rfl: 2  No current facility-administered medications for this visit.     Facility-Administered Medications Ordered in Other Visits:     clindamycin 900 MG/50 ML D5W 900 mg/50 mL IVPB 900 mg, 900 mg, Intravenous, Q8H, Mitch Chan MD, 900 mg at 11/18/20 1312    lactated ringers infusion, , Intravenous, Continuous, Tam Reynolds MD    lidocaine (PF) 10 mg/ml (1%) injection 10 mg, 1 mL, Intradermal, Once, Tam Reynolds MD    REVIEW OF SYSTEMS:  General: No fevers or chills; ENT: No sore throat; Allergy and Immunology: no persistent infections; Hematological and Lymphatic: No history of bleeding or easy bruising; Endocrine: negative; Respiratory: no cough, shortness of breath, or wheezing; Cardiovascular: no chest pain or dyspnea " on exertion; Gastrointestinal: no abdominal pain/back, change in bowel habits, or bloody stools; Genito-Urinary: no dysuria, trouble voiding, or hematuria; Musculoskeletal: negative; Neurological: no TIA or stroke symptoms; Psychiatric: no nervousness, anxiety or depression.    PHYSICAL EXAM:                General appearance:  Alert, well-appearing, and in no distress.  Oriented to person, place, and time                    Neurological: Normal speech, no focal findings noted; CN II - XII grossly intact. RLE with sensation to light touch, LLE with sensation to light touch.            Musculoskeletal: Digits/nail without cyanosis/clubbing.  Strength 5/5 BLE.                    Neck: Supple, no significant adenopathy                  Chest:  Clear to auscultation, no wheezes, rales or rhonchi, symmetric air entry. No use of accessory muscles               Cardiac: Normal rate and regular rhythm, S1 and S2 normal            Abdomen: Soft, nontender, nondistended, no masses or organomegaly, no hernia     No rebound tenderness noted; bowel sounds normal     No groin adenopathy      Extremities:   2+ R femoral pulse, 2+ L femoral pulse     doppler+ R popliteal pulse, doppler+ L popliteal pulse     doppler+ R PT pulse, doppler+ L PT pulse     doppler+ R DP pulse, doppler+ L DP pulse     1+ RLE edema, 2+ LLE edema    Skin: RLE with medial ankle, and lower foot medial wounds, bleeding present, no infection or drainage; large calcaneal wound with necrotic fat and muscle with exposed calcaneous, no drainage, min granulation tissue; LLE with minimal brawny edema, improved/smaller foot wound with markedly improved granulation tissue, no odor, no purulence or erythema    LAB RESULTS:  No results found for: CBC  Lab Results   Component Value Date    LABPROT 11.2 05/31/2019    INR 1.1 05/31/2019     Lab Results   Component Value Date     10/27/2020    K 4.6 10/27/2020     10/27/2020    CO2 29 10/27/2020    GLU 87  10/27/2020    BUN 59 (H) 10/27/2020    CREATININE 1.5 (H) 10/27/2020    CALCIUM 8.9 10/27/2020    ANIONGAP 8 10/27/2020    EGFRNONAA 49 (A) 10/27/2020     Lab Results   Component Value Date    WBC 6.66 10/27/2020    RBC 4.00 (L) 10/27/2020    HGB 11.5 (L) 10/27/2020    HCT 36.0 (L) 10/27/2020    MCV 90 10/27/2020    MCH 28.8 10/27/2020    MCHC 31.9 (L) 10/27/2020    RDW 18.3 (H) 10/27/2020     10/27/2020    MPV 9.6 10/27/2020    GRAN 5.0 10/27/2020    GRAN 74.4 (H) 10/27/2020    LYMPH 0.5 (L) 10/27/2020    LYMPH 7.1 (L) 10/27/2020    MONO 0.7 10/27/2020    MONO 11.1 10/27/2020    EOS 0.4 10/27/2020    BASO 0.06 10/27/2020    EOSINOPHIL 6.2 10/27/2020    BASOPHIL 0.9 10/27/2020    DIFFMETHOD Automated 10/27/2020     .  Lab Results   Component Value Date    HGBA1C 7.1 (H) 12/02/2020       IMAGING:  All pertinent imaging has been reviewed and interpreted independently.    BLE arterial US 1/2019: No focal stenosis    5/16/19: BLE with no HD significant stenosis, ISDRA 0.8/1.46, left ankle pressures 60 and 171    7/29/19: BLE arterial US with approx 50% R TPT stenosis, SIDRA 0.8; LLE showing no HD significant stenosis, SIDRA 0.66    11/4/19: SIDRA 0.4/0.4, DP vessel NC bilaterally, R toe waveform normal  Right lower extremity arterial ultrasound shows no hemodynamically significant stenosis.  Pressures indicate moderate arterial occlusive disease.  Left lower extremity arterial ultrasound shows no hemodynamically significant stenosis.  Pressures indicate moderate arterial occlusive disease.    5/2020:  1. Right lower extremity pressures and waveforms indicate moderate arterial occlusive disease.  2. Left lower extremity pressures and waveforms indicate no hemodynamically significant arterial occlusive disease.    7/2020:  1. Right lower extremity arterial ultrasound shows approximately 50% TP trunk stenosis and an occluded PT artery.  There is no hemodynamically significant stenosis.  Pressures indicate severe arterial  occlusive disease.  2. Left lower extremity arterial ultrasound shows TP trunk hemodynamically significant stenosis and an occluded PT artery.  Pressures indicate severe arterial occlusive disease.    1. Right lower extremity pressures and waveforms indicate moderate to severe arterial occlusive disease.  2. Left lower extremity pressures and waveforms indicate moderate to severe arterial occlusive disease.    10/2020 RLE arterial US showing right TPT stenosis.    IMP/PLAN:  60 y.o. male with   Patient Active Problem List   Diagnosis    Epiretinal membrane, both eyes    Nuclear sclerotic cataract of right eye    Pseudophakia, left eye    Ptosis, left eyelid    DM type 2 with diabetic peripheral neuropathy    Mixed hyperlipidemia    Neovascular glaucoma of both eyes    Tophaceous gout    Essential hypertension    Coronary artery disease involving native coronary artery of native heart without angina pectoris    Neovascular glaucoma of left eye, severe stage    Statin myopathy    Neovascular glaucoma, right eye, mild stage    PVD (peripheral vascular disease)    Right wrist pain    Hepatosplenomegaly    Type 2 diabetes mellitus with left diabetic foot ulcer    Other ascites    MDR Acinetobacter baumannii infection    Debility    Subacute osteomyelitis of left foot    Stage 3 chronic kidney disease    Atherosclerosis of left lower extremity with ulceration of midfoot    CKD stage 3 due to type 2 diabetes mellitus    Anemia due to multiple mechanisms    Persistent proteinuria    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    Ischemic cardiomyopathy    Status post abdominal paracentesis    Diabetic ulcer of right lower leg    Diabetic ulcer of toe of right foot associated with type 2 diabetes mellitus, with bone involvement without evidence of necrosis    Diabetes mellitus due to underlying condition with foot ulcer, without long-term current use of insulin    Type 2 diabetes  mellitus with right diabetic foot ulcer    Statin intolerance    Abnormal EKG    Atherosclerosis of native artery of right lower extremity with ulceration of ankle    Venous stasis ulcer with edema of lower leg    Atherosclerosis of native artery of right leg with ulceration    being managed by PCP and specialists who is here today for evaluation of L foot wound.    -Improving L foot wound improved s/p debridement 2/13/19, multifactorial due to diabetes, PVD and venous insufficiency with improvement in granulation tissue on Abx due to multidrug resistent bacteria in the past s/p debridements and L tibial angioplasty x 2 with improvement in wound +granulation tissue s/p debridement 6/5/19 and [place] placement; anticipate healing soon; cont wound care and offloading  -R heel wound s/p R SFA angioplasty 10/6/20 s/p debridement in OR 11/6/20 with need for further debridement; if poor bleeding in OR rec tibial revascularization - will communicate surgical planning with Dr. Mcgee  -Cont ID rec Abx regimen  -Recommend continued wound care center care - seeing Dr. Mcgee; may benefit from hyperbaric O2 therapy  -Rec elevation of BLE above level of the heart when at rest  -Low sodium diet  -Strict glycemic control    I spent 11 minutes evaluating this patient and greater than 50% of the time was spent counseling, coordinator care and discussing the plan of care.  All questions were answered and patient stated understanding with agreement with the above treatment plan.    Mitch Chan MD [facility]  Vascular and Endovascular Surgery

## 2020-12-07 NOTE — LETTER
December 7, 2020        Rubén Davis MD  [address] [facility] [address]             [facility]  [address]  [facility] [address]  Phone: [phone]  Fax: [phone]   Patient: Marvin Ray   MR Number: [id number]   YOB: 1960   Date of Visit: 12/7/2020       Dear Dr. Davis:    Thank you for referring Marvin Ray to me for evaluation. Below are the relevant portions of my assessment and plan of care.            If you have questions, please do not hesitate to call me. I look forward to following Marvin along with you.    Sincerely,      Mitch Chan MD           CC  No Recipients

## 2020-12-08 ENCOUNTER — HOSPITAL ENCOUNTER (OUTPATIENT)
Facility: HOSPITAL | Age: 60
Discharge: HOME | End: 2020-12-08
Attending: RADIOLOGY | Admitting: RADIOLOGY
Payer: MEDICAID

## 2020-12-08 ENCOUNTER — HOSPITAL ENCOUNTER (OUTPATIENT)
Dept: INTERVENTIONAL RADIOLOGY/VASCULAR | Facility: HOSPITAL | Age: 60
Discharge: HOME | End: 2020-12-08
Attending: FAMILY MEDICINE
Payer: MEDICAID

## 2020-12-08 DIAGNOSIS — R18.8 OTHER ASCITES: ICD-10-CM

## 2020-12-08 PROCEDURE — A7048 VACUUM DRAIN BOTTLE/TUBE KIT: HCPCS

## 2020-12-08 PROCEDURE — 49083 IR PARACENTESIS WITH IMAGING: ICD-10-PCS | Mod: ,,, | Performed by: RADIOLOGY

## 2020-12-08 PROCEDURE — 49083 ABD PARACENTESIS W/IMAGING: CPT

## 2020-12-08 PROCEDURE — 49083 ABD PARACENTESIS W/IMAGING: CPT | Mod: ,,, | Performed by: RADIOLOGY

## 2020-12-08 NOTE — BRIEF OP NOTE
Radiology Post-Procedure Note     Pre Op Diagnosis: Recurrent painful, tense ascites  Post Op Diagnosis: Same     Procedure: U/S-guided therapeutic LVP     Procedure performed by: Zach Cha MD     Written Informed Consent Obtained: Yes  Specimen Removed: YES, 6000-cc of thin, straw-colored ascitic fluid  Estimated Blood Loss: none     Findings:   1. Successful therapeutic large-volume paracentesis with local anesthetic only and albumin infusion post PRN as indicated per institutional protocol. Patient tolerated the procedure well. No immediate post-procedural complications noted.       Thank you for considering IR for the care of your patient.      Zach Cha MD  Interventional Radiology

## 2020-12-08 NOTE — H&P
Radiology History & Physical      SUBJECTIVE:     Chief Complaint: Recurrent painful, tense ascites     History of Present Illness:  Marvin Ray is a 60 y.o. male with PMHx of CKD III and combined systolic/diastolic CHF complicated by recurrent abdominal distension and pain 2/2 recurrent painful, tense ascites without TTP on PE to suggest SBP requiring frequent  therapeutic LVP.      A new outpatient IR consult placed for US-guided therapeutic large-volume paracentesis.    Past Medical History:   Diagnosis Date    Arthritis     Cellulitis     CKD (chronic kidney disease), stage III     Coronary artery disease     Diabetes mellitus     Diabetic retinopathy     Diabetic ulcer of left foot     Glaucoma     Gout     Hyperlipemia     Hypertension     ICD (implantable cardioverter-defibrillator) in place 11/02/2018    Left chest    Non-pressure chronic ulcer of other part of left foot with fat layer exposed 10/23/2018    PVD (peripheral vascular disease)     Type 2 diabetes mellitus with left diabetic foot ulcer 10/29/2018    Unsteady gait     uses a wheelchair     Past Surgical History:   Procedure Laterality Date    AHMED GLAUCOMA IMPLANT Left 2011    DONE AT [facility]    AORTOGRAPHY WITH SERIALOGRAPHY Left 4/30/2019    Procedure: AORTOGRAM, WITH SERIALOGRAPHY, LEFT LOWER EXTREMITY, POSSIBLE INTERVENTION;  Surgeon: Mitch Chan MD;  Location: [facility] OR;  Service: Vascular;  Laterality: Left;  RN PRE OP 4-23-19.  NO H & P, No [place]  [place]    AORTOGRAPHY WITH SERIALOGRAPHY Right 10/6/2020    Procedure: AORTOGRAM, WITH SERIALOGRAPHY, RIGHT LOWER EXTREMITY ANGIOGRAM, POSSIBLE INTERVENTION;  Surgeon: Mitch Chan MD;  Location: [facility] OR;  Service: Vascular;  Laterality: Right;  RN PREOP 10/1/2020--COVID NEGATIVE ON 10/5    BAERVELDT GLAUCOMA IMPLANT Left 2012    WITH CATARACT EXTRACTION//DONE AT [facility]    CARDIAC CATHETERIZATION Left 05/2016    CARDIAC DEFIBRILLATOR PLACEMENT Left  11/02/2018    CATARACT EXTRACTION W/  INTRAOCULAR LENS IMPLANT Left 2012    WITH BAERVEDT//DONE AT [facility]    CATARACT EXTRACTION W/  INTRAOCULAR LENS IMPLANT Right 09/26/2018    COMPLEX ()    CB DESTRUCTION WITH CYCLO G6 Left 02/15/2017        CYST REMOVAL      DEBRIDEMENT OF FOOT  12/28/2018    Procedure: DEBRIDEMENT, FOOT;  Surgeon: Mitch Wall MD;  Location[facility];  Service: Vascular;;    DEBRIDEMENT OF FOOT  6/5/2019    Procedure: DEBRIDEMENT, FOOT;  Surgeon: Mitch Wall MD;  Location: [facility] system OR;  Service: Vascular;;    EXAMINATION UNDER ANESTHESIA Left 12/5/2018    Procedure: Exam under anesthesia, left foot debridement, washout and all other indicated procedures;  Surgeon: Mitch Wall MD;  Location: [facility] OR;  Service: Vascular;  Laterality: Left;  1030AM START  RN PREOP 12/3/2018-----AICD  SEEN BY DR JAMES 12/4    EXAMINATION UNDER ANESTHESIA Left 2/13/2019    Procedure: LEFT FOOT WOUND DEBRIDEMENT, WASHOUT AND ALL OTHER INDICATED PROCEDURES;  Surgeon: Mitch Wall MD;  Location: [facility] OR;  Service: Vascular;  Laterality: Left;  requesting 1st case start  THIS CASE 1ST PER DR WALL ON 2/1/19 @ 844AM -[place]  RN PREOP 2/6/2019  HAS CARDIAC CLEARANCE    EXAMINATION UNDER ANESTHESIA Left 12/28/2018    Procedure: Exam under anesthesia, left foot debridement, left foot washout, possible left second through fifth metatarsal resection;  Surgeon: Mitch Wall MD;  Location: [facility] OR;  Service: Vascular;  Laterality: Left;  1:00pm start per julissa @ 8:58am  RN  PHONE PRE OP 12-27-18--=Pt coming from [facility] on [address]  NEED CONSENT    EXAMINATION UNDER ANESTHESIA Left 6/5/2019    Procedure: LEFT FOOT DEBRIDEMENT, WASHOUT AND ALL OTHER INDICATED PROCEDURES;  Surgeon: Mitch Wall MD;  Location: [facility] OR;  Service: Vascular;  Laterality: Left;  RN PRE OP 5-31-19------ICD------NEED CONSENT    EXAMINATION UNDER ANESTHESIA Right 11/9/2020     Procedure: RIGHT FOOT DEBRIDEMENT, WASHOUT AND ALL OTHER INDICATED PROCEDURES;  Surgeon: Mitch Chan MD;  Location: [facility] OR;  Service: Vascular;  Laterality: Right;  RN PREOP 10/27/2020, --COVID NEGATIVE ON 11/6, has cardiac clearance/[place] 10/27/2020, pt has ICD  NEEDS ORDERS    INCISION AND DRAINAGE Left 11/14/2018    Procedure: Incision and Drainage, left lower extremity debridement, washout;  Surgeon: Mitch Chan MD;  Location: [facility] OR;  Service: Vascular;  Laterality: Left;    INCISION AND DRAINAGE Left 11/16/2018    Procedure: INCISION AND DRAINAGE;  Surgeon: Mitch Chan MD;  Location: [facility] OR;  Service: Vascular;  Laterality: Left;    INCISION AND DRAINAGE Right 11/18/2020    Procedure: Debridement and washout right lower extremity wounds;  Surgeon: Mitch Chan MD;  Location: [facility] OR 2ND FLR;  Service: Vascular;  Laterality: Right;    INTRAOCULAR PROSTHESES INSERTION Right 9/26/2018    Procedure: INSERTION, IOL PROSTHESIS;  Surgeon: Perla Cortés MD;  Location: [facility] OR 1ST FLR;  Service: Ophthalmology;  Laterality: Right;    PERITONEOCENTESIS N/A 3/1/2019    Procedure: PARACENTESIS, ABDOMINAL, INITIAL;  Surgeon: Dosc Diagnostic Provider;  Location: [facility] OR;  Service: Radiology;  Laterality: N/A;    PERITONEOCENTESIS N/A 3/25/2019    Procedure: PARACENTESIS, ABDOMINAL, INITIAL;  Surgeon: Dosc Diagnostic Provider;  Location: [facility] OR;  Service: Radiology;  Laterality: N/A;    PERITONEOCENTESIS N/A 7/22/2019    Procedure: PARACENTESIS, ABDOMINAL, INITIAL;  Surgeon: Dosc Diagnostic Provider;  Location: [facility] OR;  Service: Radiology;  Laterality: N/A;    PERITONEOCENTESIS N/A 8/16/2019    Procedure: PARACENTESIS, ABDOMINAL, INITIAL;  Surgeon: Dosc Diagnostic Provider;  [facility] OR;  Service: Radiology;  Laterality: N/A;    PERITONEOCENTESIS N/A 9/26/2019    Procedure: PARACENTESIS, ABDOMINAL;  Surgeon: Dosc Diagnostic Provider;  Location: [facility] OR;  Service:  Radiology;  Laterality: N/A;    PERITONEOCENTESIS N/A 10/11/2019    Procedure: PARACENTESIS, ABDOMINAL;  Surgeon: Dosc Diagnostic Provider;  Location: [facility] OR;  Service: Radiology;  Laterality: N/A;    PERITONEOCENTESIS N/A 10/31/2019    Procedure: PARACENTESIS, ABDOMINAL;  Surgeon: Dosc Diagnostic Provider;  Location: [facility] OR;  Service: Radiology;  Laterality: N/A;    PERITONEOCENTESIS N/A 11/18/2019    Procedure: PARACENTESIS, ABDOMINAL;  Surgeon: Dosc Diagnostic Provider;  Location: [facility] OR;  Service: Radiology;  Laterality: N/A;    PERITONEOCENTESIS N/A 12/16/2019    Procedure: PARACENTESIS, ABDOMINAL;  Surgeon: Dosc Diagnostic Provider;  Location: [facility] OR;  Service: Radiology;  Laterality: N/A;  2PM START    PERITONEOCENTESIS N/A 2/7/2020    Procedure: PARACENTESIS, ABDOMINAL;  Surgeon: Dosc Diagnostic Provider;  Location: [facility] OR;  Service: Radiology;  Laterality: N/A;  REQUESTED 10:30A START    PERITONEOCENTESIS N/A 2/19/2020    Procedure: PARACENTESIS, ABDOMINAL;  Surgeon: Dosc Diagnostic Provider;  Location: [facility] OR;  Service: Radiology;  Laterality: N/A;    PERITONEOCENTESIS N/A 2/28/2020    Procedure: PARACENTESIS, ABDOMINAL;  Surgeon: Dos Diagnostic Provider;  Location: [facility] OR;  Service: Radiology;  Laterality: N/A;  REQUESTED 10AM START    PHACOEMULSIFICATION OF CATARACT Right 9/26/2018    Procedure: PHACOEMULSIFICATION, CATARACT;  Surgeon: Perla Cortés MD;  Location: [address];  Service: Ophthalmology;  Laterality: Right;    REPEAT ABDOMINAL PARACENTESIS N/A 2/11/2019    Procedure: PARACENTESIS, ABDOMINAL, REPEAT;  Surgeon: Dosc Diagnostic Provider;  Location: [facility] OR;  Service: Radiology;  Laterality: N/A;  1PM START    REPEAT ABDOMINAL PARACENTESIS N/A 9/12/2019    Procedure: PARACENTESIS, ABDOMINAL, REPEAT;  Surgeon: Dosc Diagnostic Provider;  Location: [facility] OR;  Service: Radiology;  Laterality: N/A;  9AM START    REPEAT ABDOMINAL PARACENTESIS N/A 12/2/2019    Procedure:  PARACENTESIS, ABDOMINAL, REPEAT;  Surgeon: Dosc Diagnostic Provider;  Location: [facility] OR;  Service: Radiology;  Laterality: N/A;  3PM START    REPEAT ABDOMINAL PARACENTESIS N/A 1/10/2020    Procedure: PARACENTESIS, ABDOMINAL, REPEAT;  Surgeon: Dosc Diagnostic Provider;  Location: [facility] OR;  Service: Radiology;  Laterality: N/A;  1130AM START    REPEAT ABDOMINAL PARACENTESIS N/A 1/20/2020    Procedure: PARACENTESIS, ABDOMINAL, REPEAT;  Surgeon: Dosc Diagnostic Provider;  Location: [facility] OR;  Service: Radiology;  Laterality: N/A;  1PM START    REPEAT ABDOMINAL PARACENTESIS N/A 1/31/2020    Procedure: PARACENTESIS, ABDOMINAL, REPEAT;  Surgeon: Dosc Diagnostic Provider;  Location: [facility] OR;  Service: Radiology;  Laterality: N/A;  10AM START    REPEAT ABDOMINAL PARACENTESIS N/A 3/16/2020    Procedure: PARACENTESIS, ABDOMINAL, REPEAT;  Surgeon: Dosc Diagnostic Provider;  Location: [facility] OR;  Service: Radiology;  Laterality: N/A;  10AM START    REPEAT ABDOMINAL PARACENTESIS N/A 3/30/2020    Procedure: PARACENTESIS, ABDOMINAL, REPEAT;  Surgeon: Dosc Diagnostic Provider;  Location: [facility] OR;  Service: Radiology;  Laterality: N/A;    REPEAT ABDOMINAL PARACENTESIS N/A 4/9/2020    Procedure: PARACENTESIS, ABDOMINAL, REPEAT;  Surgeon: Dos Diagnostic Provider;  Location: [facility] OR;  Service: Radiology;  Laterality: N/A;  1130AM START    REPEAT ABDOMINAL PARACENTESIS N/A 5/8/2020    Procedure: PARACENTESIS, ABDOMINAL, REPEAT;  Surgeon: Dosc Diagnostic Provider;  Location: [facility] OR;  Service: Radiology;  Laterality: N/A;  9AM START    REPEAT ABDOMINAL PARACENTESIS N/A 5/21/2020    Procedure: PARACENTESIS, ABDOMINAL, REPEAT;  Surgeon: Dosc Diagnostic Provider;  Location: [facility] OR;  Service: Radiology;  Laterality: N/A;  10AM START    REPEAT ABDOMINAL PARACENTESIS N/A 6/5/2020    Procedure: PARACENTESIS, ABDOMINAL, REPEAT;  Surgeon: Dosc Diagnostic Provider;  Location: [facility] OR;  Service: Radiology;  Laterality: N/A;  NOON START     REPEAT ABDOMINAL PARACENTESIS N/A 7/10/2020    Procedure: PARACENTESIS, ABDOMINAL, REPEAT;  Surgeon: Dosc Diagnostic Provider;  Location: [facility] OR;  Service: Radiology;  Laterality: N/A;  1PM START    REPEAT ABDOMINAL PARACENTESIS N/A 8/20/2020    Procedure: PARACENTESIS, ABDOMINAL, REPEAT;  Surgeon: Dosc Diagnostic Provider;  Location: [facility] OR;  Service: Radiology;  Laterality: N/A;  1PM START    REPEAT ABDOMINAL PARACENTESIS N/A 9/4/2020    Procedure: PARACENTESIS, ABDOMINAL, REPEAT;  Surgeon: Dos Diagnostic Provider;  Location: [facility] OR;  Service: Radiology;  Laterality: N/A;  11 AM START    REPEAT ABDOMINAL PARACENTESIS N/A 9/16/2020    Procedure: PARACENTESIS, ABDOMINAL, REPEAT;  Surgeon: Dos Diagnostic Provider;  Location: [facility] OR;  Service: Radiology;  Laterality: N/A;  START 2:00 P.M.    REPEAT ABDOMINAL PARACENTESIS N/A 9/28/2020    Procedure: PARACENTESIS, ABDOMINAL, REPEAT;  Surgeon: Dos Diagnostic Provider;  Location: [facility] OR;  Service: Radiology;  Laterality: N/A;  1 P.M. START    REPEAT ABDOMINAL PARACENTESIS N/A 11/3/2020    Procedure: PARACENTESIS, ABDOMINAL, REPEAT;  Surgeon: Dosc Diagnostic Provider;  Location: [facility] OR;  Service: Radiology;  Laterality: N/A;  11AM START    REPEAT ABDOMINAL PARACENTESIS N/A 11/13/2020    Procedure: PARACENTESIS, ABDOMINAL, REPEAT;  Surgeon: Dosc Diagnostic Provider;  Location: [facility] OR;  Service: Radiology;  Laterality: N/A;  12PM START    REPEAT ABDOMINAL PARACENTESIS N/A 11/23/2020    Procedure: PARACENTESIS, ABDOMINAL, REPEAT;  Surgeon: Dosc Diagnostic Provider;  Location: [facility] OR;  Service: Radiology;  Laterality: N/A;    REPEAT ABDOMINAL PARACENTESIS N/A 12/1/2020    Procedure: PARACENTESIS, ABDOMINAL, REPEAT;  Surgeon: Dos Diagnostic Provider;  Location: [facility] OR;  Service: Radiology;  Laterality: N/A;  11AM START    REVISION OF PROCEDURE INVOLVING GLAUCOMA DRAINAGE DEVICE Left 10/2/2019    EXTRUDING BAERVELDT /WITH PERICARDIAL PATCH GRAFT  ()    Right foot surgery  10/2014    TOE AMPUTATION Right     first and second    TOE AMPUTATION Left 11/16/2018    Procedure: AMPUTATION, TOES  2-5;  Surgeon: Mitch Chan MD;  Location: [facility] OR;  Service: Vascular;  Laterality: Left;    TOE AMPUTATION Left 12/5/2018    Procedure: AMPUTATION, TOE;  Surgeon: Mitch Chan MD;  Location: [facility] OR;  Service: Vascular;  Laterality: Left;  left great toe    TONSILLECTOMY       Home Meds:   Prior to Admission medications    Medication Sig Start Date End Date Taking? Authorizing Provider   allopurinoL (ZYLOPRIM) 300 MG tablet Take 1 tablet (300 mg total) by mouth once daily. 10/6/20   Rubén Davis MD   aspirin (ECOTRIN) 81 MG EC tablet Take 81 mg by mouth once daily.    Historical Provider   bisacodyl (DULCOLAX) 5 mg EC tablet Take 5 mg by mouth daily as needed for Constipation.    Historical Provider   brimonidine 0.2% (ALPHAGAN) 0.2 % Drop Place 1 drop into both eyes 3 (three) times daily.  Patient not taking: Reported on 12/7/2020 12/12/19   Perla Cortés MD   carvediloL (COREG) 3.125 MG tablet TAKE 1 TABLET(3.125 MG) BY MOUTH TWICE DAILY 11/23/20   Rubén Davis MD   coenzyme Q10 100 mg capsule Take 100 mg by mouth every morning.    Historical Provider   collagenase (SANTYL) ointment Apply topically once daily. 10/20/20   Rubén Davis MD   dorzolamide-timolol 2-0.5% (COSOPT) 22.3-6.8 mg/mL ophthalmic solution Place 1 drop into both eyes 3 (three) times daily. 12/12/19   Perla Cortés MD   ezetimibe (ZETIA) 10 mg tablet Take 1 tablet (10 mg total) by mouth once daily. 11/23/20   Rubén Davis MD   fish oil-omega-3 fatty acids 300-1,000 mg capsule Take 1 capsule by mouth 2 (two) times daily. 1/23/19   Sara Ching MD   gabapentin (NEURONTIN) 100 MG capsule TAKE 2 CAPSULES(200 MG) BY MOUTH EVERY EVENING 9/21/20   Rubén Davis MD   HYDROcodone-acetaminophen (NORCO) 7.5-325 mg per tablet Take 1 tablet by  "mouth every 8 (eight) hours as needed for Pain.  Patient not taking: Reported on 12/7/2020 10/20/20   Rubén Davis MD   insulin (LANTUS SOLOSTAR U-100 INSULIN) glargine 100 units/mL (3mL) SubQ pen Inject 35 units once daily 9/11/20   Sheryl Madison NP   insulin degludec-liraglutide (XULTOPHY 100/3.6) 100 unit-3.6 mg /mL (3 mL) InPn Inject 35 Units into the skin once daily.    Historical Provider   MULTIVIT,THER IRON,CA,FA & MIN (MULTIVITAMIN) Tab Take 1 tablet by mouth every morning.     Historical Provider   oxyCODONE (ROXICODONE) 5 MG immediate release tablet Take 1 tablet (5 mg total) by mouth every 6 (six) hours as needed for Pain.  Patient not taking: Reported on 12/7/2020 11/18/20   Kendall Nieto MD   oxyCODONE (ROXICODONE) 5 MG immediate release tablet Take 1 tablet (5 mg total) by mouth every 6 (six) hours as needed for Pain.  Patient not taking: Reported on 12/7/2020 11/18/20   Mitch Chan MD   pen needle, diabetic (BD ULTRA-FINE AMADOR PEN NEEDLE) 32 gauge x 5/32" Ndle 1 Device by Misc.(Non-Drug; Combo Route) route 2 (two) times a day.  Patient not taking: Reported on 12/7/2020 9/11/20   Sheryl Madison NP   torsemide (DEMADEX) 20 MG Tab Take 4 tablets (80 mg total) by mouth 2 (two) times daily. 11/5/20 12/5/20  Rubén Davis MD     Anticoagulants/Antiplatelets: no anticoagulation    Allergies:   Review of patient's allergies indicates:   Allergen Reactions    Statins-hmg-coa reductase inhibitors      Generalized Pain    Onglyza [saxagliptin]     Penicillins Rash     Sedation History:  no adverse reactions     Review of Systems:   Hematological: no known coagulopathies  Respiratory: positive for - orthopnea and shortness of breath  Cardiovascular: positive for - dyspnea on exertion, orthopnea and shortness of breath  Gastrointestinal: positive for - abdominal pain and distension  Genito-Urinary: no dysuria, trouble voiding, or hematuria  Musculoskeletal: negative  Neurological: no TIA or " stroke symptoms      OBJECTIVE:     Vital Signs (Most Recent)     Physical Exam:  General: no acute distress  Mental Status: alert and oriented to person, place and time  HEENT: normocephalic, atraumatic  Chest: mild labored breathing  Heart: regular heart rate  Abdomen: +distended. No TTP/r/g.  Extremity: moves all extremities    Laboratory  Lab Results   Component Value Date    INR 1.1 05/31/2019       Lab Results   Component Value Date    WBC 6.66 10/27/2020    HGB 11.5 (L) 10/27/2020    HCT 36.0 (L) 10/27/2020    MCV 90 10/27/2020     10/27/2020      Lab Results   Component Value Date    GLU 87 10/27/2020     10/27/2020    K 4.6 10/27/2020     10/27/2020    CO2 29 10/27/2020    BUN 59 (H) 10/27/2020    CREATININE 1.5 (H) 10/27/2020    CALCIUM 8.9 10/27/2020    MG 2.1 05/31/2019    ALT 12 10/27/2020    AST 15 10/27/2020    ALBUMIN 2.7 (L) 10/27/2020    BILITOT 0.6 10/27/2020     ASSESSMENT/PLAN:     61 y/o M with CKD III and combined systolic/diastolic CHF complicated by recurrent abdominal distension and pain 2/2 recurrent painful, tense ascites without TTP on PE to suggest SBP, requiring frequent large-volume therapeutic paracentesis.      1. Recurrent painful, tense ascites - Will attempt U/S-guided therapeutic paracentesis with local anesthetic only and albumin infusion post PRN per institutional protocol.      Risks (including, but not limited to, pain, bleeding, infection, damage to nearby structures, failure to obtain sufficient material for a diagnosis, the need for additional procedures, and death), benefits, and alternatives were discussed with the patient. All questions were answered to the best of my abilities. The patient wishes to proceed with the procedure. Written informed consent was obtained.     Thank you for considering IR for the care of your patient.      Zach Cha MD  Interventional Radiology

## 2020-12-08 NOTE — PROGRESS NOTES
CC: This 60 y.o. White male  is here for evaluation of  T2DM along with comorbidities indicated in the Visit Diagnosis section of this encounter.    HPI: Marvin Ray was diagnosed with T2DM in his late 20s. Pt was initially diet controlled, then required oral medications, then eventually required insulin.   A1c in July 2018 was high at > 14%  because he couldn't afford insulin (Lantus ~$600).        Prior visit 6/2020 arrives with his sister Chloé.  Continues to see Dr. Davis in [facility] for chronic left foot wound. States that his foot is almost completely healed.   Overall BGs are higher. He admits that he has not been monitoring his diet. He is snacking overnight - a fruit or 2 no-sugar cookies. Eating more fruit than vegetables. Drinks more cranberry juice now. Doesn't eat as much salads.   Plan DM worse because of diet. Needs to focus on improving diet. Avoid beverages with sugar and watch milk intake.   Labs today. A1C and BMP.   Test glucose 1-2x/day. Before meal or 2 hours after a meal.   Continue Xultophy 35 units once daily.  Return to clinic in 3 mo with a1c and lipid panel.      Prior visit 9/2020 arrives with his sister Chloé.  a1c is worse from 8.3 to 8.7%. He now has right wound infection as well. Denies overeating.   His sister has been struggling with her own medical issues and she's missed giving him his shots a few times and hasn't been testing his BGs in the last month. Pt takes very long to administer his own injections and test his BG.   Plan Suspect higher a1c is related to new right foot infection.   Stop Xultophy and start taking long acting insulin and GLP1-receptor agonist separately so that he can get higher dosing of the latter.   Start Lantus 35 units once daily in the morning.   Start Victoza 1.2 mg once daily for 2 weeks. Then increase to 1.8 mg once daily.   Test glucose 2x/day. Follow up in 2 months with a1c prior.       Interval history  a1c is down from 8.7 to 7.1%. pt  has actually continued same treatment as lov and taking just Xultophy. Suspect that DM control has improved d/t improvement in foot infection.         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 Also suffered cardiac arrest in Dec 2018.   Ischemic cardiomyopathy, CAD, PVD     LAST DIABETES EDUCATION: years ago     PRESCRIBED DIABETES MEDICATIONS:  Xultophy 35 units every morning, Novolog per sliding scale - Use on premeal readings: 150-200=+2, 201-250=+4; 251-300=+6; 301-350=+8, over 350=+10 units      Misses medication doses - No    DM COMPLICATIONS: nephropathy, retinopathy, peripheral neuropathy and cardiovascular disease  toe amputations (2/2 infection)    SIGNIFICANT DIABETES MED HISTORY:   Constipation on Onglyza   Reports oral meds like metformin and glyburide were ineffective.     SELF MONITORING BLOOD GLUCOSE: tests daily, FBGs 90-110s, after breakfast 120-160s    HYPOGLYCEMIC EPISODES: None      CURRENT DIET: drinks water, protein drink, sometimes coke zero, milk twice weekly. Eats 3 meals/day. 1 yogurt per day.       /68 (BP Location: Right arm, Patient Position: Sitting, BP Method: Large (Automatic))   Pulse 80   Temp 97.9 °F (36.6 °C) (Temporal)   Wt 93 kg (205 lb)   BMI 29.41 kg/m²       ROS:   CONSTITUTIONAL: Appetite good, no fatigue  SKIN: wounds to both feet. Left foot reportedly almost all healed       PHYSICAL EXAM:  GENERAL: Well developed, well nourished. No acute distress.   PSYCH: AAOx3, appropriate mood and affect, conversant, casually-groomed. Judgement and insight good. Appears chronically ill. Arrives in w/c   NEURO: Cranial nerves grossly intact. Speech clear, no tremor.   CHEST: Respirations even and unlabored.         Hemoglobin A1C   Date Value Ref Range Status   12/02/2020 7.1 (H) 4.0 - 5.6 % Final     Comment:     ADA Screening Guidelines:  5.7-6.4%  Consistent with prediabetes  >or=6.5%  Consistent with diabetes  High levels of fetal hemoglobin  interfere with the HbA1C  assay. Heterozygous hemoglobin variants (HbS, HgC, etc)do  not significantly interfere with this assay.   However, presence of multiple variants may affect accuracy.     09/04/2020 8.7 (H) 4.0 - 5.6 % Final     Comment:     ADA Screening Guidelines:  5.7-6.4%  Consistent with prediabetes  >or=6.5%  Consistent with diabetes  High levels of fetal hemoglobin interfere with the HbA1C  assay. Heterozygous hemoglobin variants (HbS, HgC, etc)do  not significantly interfere with this assay.   However, presence of multiple variants may affect accuracy.     06/11/2020 8.3 (H) 4.0 - 5.6 % Final     Comment:     ADA Screening Guidelines:  5.7-6.4%  Consistent with prediabetes  >or=6.5%  Consistent with diabetes  High levels of fetal hemoglobin interfere with the HbA1C  assay. Heterozygous hemoglobin variants (HbS, HgC, etc)do  not significantly interfere with this assay.   However, presence of multiple variants may affect accuracy.             Chemistry        Component Value Date/Time     10/27/2020 1000    K 4.6 10/27/2020 1000     10/27/2020 1000    CO2 29 10/27/2020 1000    BUN 59 (H) 10/27/2020 1000    CREATININE 1.5 (H) 10/27/2020 1000    GLU 87 10/27/2020 1000        Component Value Date/Time    CALCIUM 8.9 10/27/2020 1000    ALKPHOS 149 (H) 10/27/2020 1000    AST 15 10/27/2020 1000    ALT 12 10/27/2020 1000    BILITOT 0.6 10/27/2020 1000    ESTGFRAFRICA 57 (A) 10/27/2020 1000    EGFRNONAA 49 (A) 10/27/2020 1000          Lab Results   Component Value Date    LDLCALC 84.2 09/04/2020          Ref. Range 9/4/2020 08:14   Cholesterol Latest Ref Range: 120 - 199 mg/dL 125   HDL Latest Ref Range: 40 - 75 mg/dL 30 (L)   Hdl/Cholesterol Ratio Latest Ref Range: 20.0 - 50.0 % 24.0   LDL Cholesterol External Latest Ref Range: 63.0 - 159.0 mg/dL 84.2   Non-HDL Cholesterol Latest Units: mg/dL 95   Total Cholesterol/HDL Ratio Latest Ref Range: 2.0 - 5.0  4.2   Triglycerides Latest Ref Range: 30 -  150 mg/dL 54     Lab Results   Component Value Date    SAVANALBCSHADI 4564.0 (H) 07/27/2018           STANDARDS of CARE:        ASA:               Last eye exam:       ASSESSMENT and PLAN:    A1C GOAL: < 7 %     1. DM type 2 with diabetic peripheral neuropathy  Blood glucoses and a1c are at goal. Continue current treatment. DM is controlled.   Test glucose 1x/day. Follow up in 3 months with labs prior. Call if blood sugars less than 90 often - may need to lower insulin dose.       Hemoglobin A1C   2. Essential hypertension  controlled   3. Coronary artery disease, angina presence unspecified, unspecified vessel or lesion type, unspecified whether native or transplanted heart  Improve glycemic control.   Avoid hypoglycemia.          Orders Placed This Encounter   Procedures    Hemoglobin A1C     Standing Status:   Future     Standing Expiration Date:   2/7/2022        Follow up in about 3 months (around 3/9/2021).

## 2020-12-08 NOTE — DISCHARGE SUMMARY
Radiology Discharge Summary      Hospital Course: No complications    Admit Date: 12/8/2020  Discharge Date: 12/08/2020     Instructions Given to Patient: Yes    Diet: Resume prior diet     Activity: activity as tolerated    Description of Condition on Discharge: Stable    Vital Signs (Most Recent):      Discharge Disposition: Home    Discharge Diagnosis:   61 y/o M with h/o recurrent painful, tense ascites s/p successful U/S-guided therapeutic LVP with local anesthetic only and albumin infusion post PRN as indicated per institutional protocol. Patient tolerated the procedure well. No immediate post-procedural complications noted.      Thank you for considering IR for the care of your patient.      Zach Cha MD  Interventional Radiology

## 2020-12-09 ENCOUNTER — OFFICE VISIT (OUTPATIENT)
Dept: ENDOCRINOLOGY | Facility: CLINIC | Age: 60
End: 2020-12-09
Payer: MEDICAID

## 2020-12-09 ENCOUNTER — TELEPHONE (OUTPATIENT)
Dept: ENDOCRINOLOGY | Facility: CLINIC | Age: 60
End: 2020-12-09

## 2020-12-09 VITALS
DIASTOLIC BLOOD PRESSURE: 68 MMHG | WEIGHT: 205 LBS | SYSTOLIC BLOOD PRESSURE: 117 MMHG | BODY MASS INDEX: 29.41 KG/M2 | HEART RATE: 80 BPM | TEMPERATURE: 98 F

## 2020-12-09 DIAGNOSIS — E11.42 DM TYPE 2 WITH DIABETIC PERIPHERAL NEUROPATHY: Primary | ICD-10-CM

## 2020-12-09 DIAGNOSIS — I25.10 CORONARY ARTERY DISEASE, ANGINA PRESENCE UNSPECIFIED, UNSPECIFIED VESSEL OR LESION TYPE, UNSPECIFIED WHETHER NATIVE OR TRANSPLANTED HEART: ICD-10-CM

## 2020-12-09 DIAGNOSIS — I10 ESSENTIAL HYPERTENSION: ICD-10-CM

## 2020-12-09 PROCEDURE — 99214 OFFICE O/P EST MOD 30 MIN: CPT | Mod: S$PBB,,, | Performed by: NURSE PRACTITIONER

## 2020-12-09 PROCEDURE — 99214 OFFICE O/P EST MOD 30 MIN: CPT | Mod: PBBFAC,PN | Performed by: NURSE PRACTITIONER

## 2020-12-09 PROCEDURE — 99214 PR OFFICE/OUTPT VISIT, EST, LEVL IV, 30-39 MIN: ICD-10-PCS | Mod: S$PBB,,, | Performed by: NURSE PRACTITIONER

## 2020-12-09 PROCEDURE — 99999 PR PBB SHADOW E&M-EST. PATIENT-LVL IV: CPT | Mod: PBBFAC,,, | Performed by: NURSE PRACTITIONER

## 2020-12-09 PROCEDURE — 99999 PR PBB SHADOW E&M-EST. PATIENT-LVL IV: ICD-10-PCS | Mod: PBBFAC,,, | Performed by: NURSE PRACTITIONER

## 2020-12-09 RX ORDER — (INSULIN DEGLUDEC AND LIRAGLUTIDE) 100; 3.6 [IU]/ML; MG/ML
35 INJECTION, SOLUTION SUBCUTANEOUS DAILY
Qty: 15 ML | Refills: 5 | Status: SHIPPED | OUTPATIENT
Start: 2020-12-09 | End: 2021-03-10 | Stop reason: SDUPTHER

## 2020-12-09 RX ORDER — (INSULIN DEGLUDEC AND LIRAGLUTIDE) 100; 3.6 [IU]/ML; MG/ML
35 INJECTION, SOLUTION SUBCUTANEOUS DAILY
Qty: 15 ML | Refills: 5 | Status: SHIPPED | OUTPATIENT
Start: 2020-12-09 | End: 2020-12-09

## 2020-12-09 NOTE — TELEPHONE ENCOUNTER
Received PA request for Xultophy. Will send to [facility] so they can help with PA. They can deliver Xultophy to his home.

## 2020-12-09 NOTE — TELEPHONE ENCOUNTER
Informed the pt and his sister that Sheryl received a fax stating that his insurance will not cover the Xultophy. She sent the Rx to [facility] so they can help with PA. They can deliver Xultophy to his home. They both understood.

## 2020-12-09 NOTE — PROGRESS NOTES
Subjective:      Patient ID: Marvin Ray is a 60 y.o. male.    Chief Complaint:Follow-up and Fever      History of Present Illness    61 y/o with a history of DM type 2, CAD with CHF s/p AICD placement.  He also has a history of diabetic foot ulcer and osteomyelitis.  He is referred back to my clinic for a right foot heal wound.  He is s/p debridement by vascular surgery and cultures were positive for Pseudomonas, Klebsiella, E faecalis and Group B strep.  He was referred to me for management of this wound.  I started him on IV meropenem for a planned 2 week course.  He is here today for follow up.      Review of Systems   Constitution: Negative for chills, decreased appetite, fever, malaise/fatigue, night sweats, weight gain and weight loss.   HENT: Negative for congestion, ear pain, hearing loss, hoarse voice, sore throat and tinnitus.    Eyes: Negative for blurred vision, redness and visual disturbance.   Cardiovascular: Negative for chest pain, leg swelling and palpitations.   Respiratory: Negative for cough, hemoptysis, shortness of breath and sputum production.    Hematologic/Lymphatic: Negative for adenopathy. Does not bruise/bleed easily.   Skin: Negative for dry skin, itching, rash and suspicious lesions.   Musculoskeletal: Negative for back pain, joint pain, myalgias and neck pain.   Gastrointestinal: Negative for abdominal pain, constipation, diarrhea, heartburn, nausea and vomiting.   Genitourinary: Negative for dysuria, flank pain, frequency, hematuria, hesitancy and urgency.   Neurological: Negative for dizziness, headaches, numbness, paresthesias and weakness.   Psychiatric/Behavioral: Negative for depression and memory loss. The patient does not have insomnia and is not nervous/anxious.      Objective:   Physical Exam  Vitals signs and nursing note reviewed.   Constitutional:       General: He is not in acute distress.     Appearance: He is well-developed. He is not diaphoretic.   HENT:       Head: Normocephalic and atraumatic.      Right Ear: External ear normal.      Left Ear: External ear normal.      Nose: Nose normal.      Mouth/Throat:      Pharynx: No oropharyngeal exudate.   Eyes:      General: No scleral icterus.        Right eye: No discharge.         Left eye: No discharge.      Conjunctiva/sclera: Conjunctivae normal.      Pupils: Pupils are equal, round, and reactive to light.   Neck:      Musculoskeletal: Normal range of motion and neck supple.      Thyroid: No thyromegaly.      Vascular: No JVD.      Trachea: No tracheal deviation.   Cardiovascular:      Rate and Rhythm: Normal rate and regular rhythm.      Heart sounds: No murmur. No friction rub. No gallop.    Pulmonary:      Effort: Pulmonary effort is normal. No respiratory distress.      Breath sounds: Normal breath sounds. No stridor. No wheezing or rales.   Chest:      Chest wall: No tenderness.   Abdominal:      General: Bowel sounds are normal. There is no distension.      Palpations: Abdomen is soft. There is no mass.      Tenderness: There is no abdominal tenderness. There is no guarding or rebound.   Musculoskeletal: Normal range of motion.         General: No tenderness.   Lymphadenopathy:      Cervical: No cervical adenopathy.   Skin:     General: Skin is warm.      Coloration: Skin is not pale.      Findings: No erythema or rash.      Comments: Right foot heal wound.  Picture taken.  Bone not exposed.   Neurological:      Mental Status: He is alert and oriented to person, place, and time.      Cranial Nerves: No cranial nerve deficit.      Motor: No abnormal muscle tone.      Coordination: Coordination normal.      Deep Tendon Reflexes: Reflexes are normal and symmetric. Reflexes normal.   Psychiatric:         Behavior: Behavior normal.         Thought Content: Thought content normal.         Judgment: Judgment normal.                      Assessment:       1. Ulcer of right foot with muscle involvement without evidence of  necrosis        59 y/o with a history of DM type 2, CAD with CHF s/p AICD placement.  He also has a history of diabetic foot ulcer and osteomyelitis.  He is referred back to my clinic for a right foot heal wound.  He is s/p debridement by vascular surgery and cultures were positive for Pseudomonas, Klebsiella, E faecalis and Group B strep.  Wounds did not go down to bone.  He is s/p 2 weeks of IV meropenem.  Wounds are stable.  I will stop the IV antibiotics and remove the picc line.      Plan:       Ulcer of right foot with muscle involvement without evidence of necrosis  -     Nursing communication; Future; Expected date: 12/03/2020

## 2020-12-09 NOTE — PATIENT INSTRUCTIONS
Blood glucoses and a1c are at goal. Continue current treatment. DM is controlled.   Test glucose 1x/day. Follow up in 3 months with labs prior. Call if blood sugars less than 90 often - may need to lower insulin dose.

## 2020-12-11 ENCOUNTER — HOSPITAL ENCOUNTER (OUTPATIENT)
Dept: WOUND CARE | Facility: HOSPITAL | Age: 60
Discharge: HOME | End: 2020-12-11
Attending: PODIATRIST
Payer: MEDICAID

## 2020-12-11 ENCOUNTER — OFFICE VISIT (OUTPATIENT)
Dept: CARDIOLOGY | Facility: CLINIC | Age: 60
End: 2020-12-11
Payer: MEDICAID

## 2020-12-11 VITALS
HEART RATE: 78 BPM | SYSTOLIC BLOOD PRESSURE: 127 MMHG | WEIGHT: 205 LBS | RESPIRATION RATE: 15 BRPM | OXYGEN SATURATION: 95 % | BODY MASS INDEX: 29.35 KG/M2 | HEIGHT: 70 IN | SYSTOLIC BLOOD PRESSURE: 120 MMHG | DIASTOLIC BLOOD PRESSURE: 60 MMHG | TEMPERATURE: 98 F | DIASTOLIC BLOOD PRESSURE: 70 MMHG | HEART RATE: 79 BPM

## 2020-12-11 DIAGNOSIS — R60.9 VENOUS STASIS ULCER WITH EDEMA OF LOWER LEG: ICD-10-CM

## 2020-12-11 DIAGNOSIS — I10 ESSENTIAL HYPERTENSION: ICD-10-CM

## 2020-12-11 DIAGNOSIS — L97.529 TYPE 2 DIABETES MELLITUS WITH LEFT DIABETIC FOOT ULCER: ICD-10-CM

## 2020-12-11 DIAGNOSIS — E11.621 TYPE 2 DIABETES MELLITUS WITH RIGHT DIABETIC FOOT ULCER: ICD-10-CM

## 2020-12-11 DIAGNOSIS — Z95.810 AUTOMATIC IMPLANTABLE CARDIOVERTER-DEFIBRILLATOR IN SITU: ICD-10-CM

## 2020-12-11 DIAGNOSIS — L97.519 TYPE 2 DIABETES MELLITUS WITH RIGHT DIABETIC FOOT ULCER: ICD-10-CM

## 2020-12-11 DIAGNOSIS — E11.622 DIABETIC ULCER OF RIGHT LOWER LEG: ICD-10-CM

## 2020-12-11 DIAGNOSIS — N18.31 STAGE 3A CHRONIC KIDNEY DISEASE: ICD-10-CM

## 2020-12-11 DIAGNOSIS — L97.516 DIABETIC ULCER OF TOE OF RIGHT FOOT ASSOCIATED WITH TYPE 2 DIABETES MELLITUS, WITH BONE INVOLVEMENT WITHOUT EVIDENCE OF NECROSIS: ICD-10-CM

## 2020-12-11 DIAGNOSIS — Z78.9 STATIN INTOLERANCE: ICD-10-CM

## 2020-12-11 DIAGNOSIS — L97.909 VENOUS STASIS ULCER WITH EDEMA OF LOWER LEG: ICD-10-CM

## 2020-12-11 DIAGNOSIS — E11.42 DM TYPE 2 WITH DIABETIC PERIPHERAL NEUROPATHY: Primary | Chronic | ICD-10-CM

## 2020-12-11 DIAGNOSIS — E11.621 DIABETIC ULCER OF TOE OF RIGHT FOOT ASSOCIATED WITH TYPE 2 DIABETES MELLITUS, WITH BONE INVOLVEMENT WITHOUT EVIDENCE OF NECROSIS: ICD-10-CM

## 2020-12-11 DIAGNOSIS — I70.233 ATHEROSCLEROSIS OF NATIVE ARTERY OF RIGHT LOWER EXTREMITY WITH ULCERATION OF ANKLE: ICD-10-CM

## 2020-12-11 DIAGNOSIS — E78.2 MIXED HYPERLIPIDEMIA: ICD-10-CM

## 2020-12-11 DIAGNOSIS — I83.009 VENOUS STASIS ULCER WITH EDEMA OF LOWER LEG: ICD-10-CM

## 2020-12-11 DIAGNOSIS — I70.244: ICD-10-CM

## 2020-12-11 DIAGNOSIS — E11.621 TYPE 2 DIABETES MELLITUS WITH LEFT DIABETIC FOOT ULCER: ICD-10-CM

## 2020-12-11 DIAGNOSIS — I70.239 ATHEROSCLEROSIS OF NATIVE ARTERY OF RIGHT LEG WITH ULCERATION: ICD-10-CM

## 2020-12-11 DIAGNOSIS — L97.919 DIABETIC ULCER OF RIGHT LOWER LEG: ICD-10-CM

## 2020-12-11 DIAGNOSIS — I25.5 ISCHEMIC CARDIOMYOPATHY: ICD-10-CM

## 2020-12-11 DIAGNOSIS — Z01.810 PREOP CARDIOVASCULAR EXAM: ICD-10-CM

## 2020-12-11 DIAGNOSIS — R94.31 ABNORMAL EKG: ICD-10-CM

## 2020-12-11 DIAGNOSIS — I73.9 PVD (PERIPHERAL VASCULAR DISEASE): ICD-10-CM

## 2020-12-11 DIAGNOSIS — I25.10 CORONARY ARTERY DISEASE INVOLVING NATIVE CORONARY ARTERY OF NATIVE HEART WITHOUT ANGINA PECTORIS: Primary | ICD-10-CM

## 2020-12-11 DIAGNOSIS — I83.899 VENOUS STASIS ULCER WITH EDEMA OF LOWER LEG: ICD-10-CM

## 2020-12-11 PROCEDURE — 99215 OFFICE O/P EST HI 40 MIN: CPT | Performed by: PODIATRIST

## 2020-12-11 PROCEDURE — 99214 OFFICE O/P EST MOD 30 MIN: CPT | Mod: S$PBB,,, | Performed by: INTERNAL MEDICINE

## 2020-12-11 PROCEDURE — 99214 OFFICE O/P EST MOD 30 MIN: CPT | Mod: ,,, | Performed by: PODIATRIST

## 2020-12-11 PROCEDURE — 99214 PR OFFICE/OUTPT VISIT, EST, LEVL IV, 30-39 MIN: ICD-10-PCS | Mod: S$PBB,,, | Performed by: INTERNAL MEDICINE

## 2020-12-11 PROCEDURE — 99214 OFFICE O/P EST MOD 30 MIN: CPT | Mod: PBBFAC,27,25 | Performed by: INTERNAL MEDICINE

## 2020-12-11 PROCEDURE — 99999 PR PBB SHADOW E&M-EST. PATIENT-LVL IV: ICD-10-PCS | Mod: PBBFAC,,, | Performed by: INTERNAL MEDICINE

## 2020-12-11 PROCEDURE — 93010 EKG 12-LEAD: ICD-10-PCS | Mod: S$PBB,,, | Performed by: INTERNAL MEDICINE

## 2020-12-11 PROCEDURE — 99999 PR PBB SHADOW E&M-EST. PATIENT-LVL IV: CPT | Mod: PBBFAC,,, | Performed by: INTERNAL MEDICINE

## 2020-12-11 PROCEDURE — 99214 PR OFFICE/OUTPT VISIT, EST, LEVL IV, 30-39 MIN: ICD-10-PCS | Mod: ,,, | Performed by: PODIATRIST

## 2020-12-11 PROCEDURE — 93005 ELECTROCARDIOGRAM TRACING: CPT | Mod: PBBFAC | Performed by: INTERNAL MEDICINE

## 2020-12-11 PROCEDURE — 93010 ELECTROCARDIOGRAM REPORT: CPT | Mod: S$PBB,,, | Performed by: INTERNAL MEDICINE

## 2020-12-11 NOTE — PROGRESS NOTES
CARDIOVASCULAR PROGRESS NOTE    REASON FOR CONSULT:   Marvin Ray is a 60 y.o. male who presents for follow up of CAD/ICM, preop CV eval.    PCP: Susan Damonc: Dustin  Optho: [facility]  HISTORY OF PRESENT ILLNESS:   The patient comes in for follow-up, accompanied by his wife.  In the interim since his last office visit he underwent right pedal wound debridement without any cardiac complication.  He apparently needs another operation, and comes in today for preoperative risk stratification.  He reports generally stable status without angina or dyspnea.  There has been no palpitations or syncope.  He denies any defibrillator discharges.  There has been no PND, orthopnea, melena, hematuria, or claudicant symptoms.  He again presents in a wheelchair in both of his feet are wrapped.    CARDIOVASCULAR HISTORY:   [facility] ICD  CAD/ICM, nonoperable per [facility] note 5/2016  PAD  CVI    PAST MEDICAL HISTORY:     Past Medical History:   Diagnosis Date    Arthritis     Cellulitis     CKD (chronic kidney disease), stage III     Coronary artery disease     Diabetes mellitus     Diabetic retinopathy     Diabetic ulcer of left foot     Glaucoma     Gout     Hyperlipemia     Hypertension     ICD (implantable cardioverter-defibrillator) in place 11/02/2018    Left chest    Non-pressure chronic ulcer of other part of left foot with fat layer exposed 10/23/2018    PVD (peripheral vascular disease)     Type 2 diabetes mellitus with left diabetic foot ulcer 10/29/2018    Unsteady gait     uses a wheelchair       PAST SURGICAL HISTORY:     Past Surgical History:   Procedure Laterality Date    AHMED GLAUCOMA IMPLANT Left 2011    DONE AT [facility]    AORTOGRAPHY WITH SERIALOGRAPHY Left 4/30/2019    Procedure: AORTOGRAM, WITH SERIALOGRAPHY, LEFT LOWER EXTREMITY, POSSIBLE INTERVENTION;  Surgeon: Mitch Chan MD;  Location: [facility];  Service: Vascular;  Laterality: Left;  RN PRE OP 4-23-19.  NO H & P, No Cosent   [place]    AORTOGRAPHY WITH SERIALOGRAPHY Right 10/6/2020    Procedure: AORTOGRAM, WITH SERIALOGRAPHY, RIGHT LOWER EXTREMITY ANGIOGRAM, POSSIBLE INTERVENTION;  Surgeon: Mitch Wall MD;  Location: [facility] OR;  Service: Vascular;  Laterality: Right;  RN PREOP 10/1/2020--COVID NEGATIVE ON 10/5    BAERVELDT GLAUCOMA IMPLANT Left 2012    WITH CATARACT EXTRACTION//DONE AT [facility]    CARDIAC CATHETERIZATION Left 05/2016    CARDIAC DEFIBRILLATOR PLACEMENT Left 11/02/2018    CATARACT EXTRACTION W/  INTRAOCULAR LENS IMPLANT Left 2012    WITH BAERVEDT//DONE AT [facility]    CATARACT EXTRACTION W/  INTRAOCULAR LENS IMPLANT Right 09/26/2018    COMPLEX ()    CB DESTRUCTION WITH CYCLO G6 Left 02/15/2017        CYST REMOVAL      DEBRIDEMENT OF FOOT  12/28/2018    Procedure: DEBRIDEMENT, FOOT;  Surgeon: Mitch Wall MD;  Location: [facility] OR;  Service: Vascular;;    DEBRIDEMENT OF FOOT  6/5/2019    Procedure: DEBRIDEMENT, FOOT;  Surgeon: Mitch Wall MD;  Location: [facility] OR;  Service: Vascular;;    EXAMINATION UNDER ANESTHESIA Left 12/5/2018    Procedure: Exam under anesthesia, left foot debridement, washout and all other indicated procedures;  Surgeon: Mitch Wall MD;  Location: [facility] OR;  Service: Vascular;  Laterality: Left;  1030AM START  RN PREOP 12/3/2018-----AICD  SEEN BY DR JAMES 12/4    EXAMINATION UNDER ANESTHESIA Left 2/13/2019    Procedure: LEFT FOOT WOUND DEBRIDEMENT, WASHOUT AND ALL OTHER INDICATED PROCEDURES;  Surgeon: Mitch Wall MD;  Location: [facility] OR;  Service: Vascular;  Laterality: Left;  requesting 1st case start  THIS CASE 1ST PER DR WALL ON 2/1/19 @ 844AM -[place]  RN PREOP 2/6/2019  HAS CARDIAC CLEARANCE    EXAMINATION UNDER ANESTHESIA Left 12/28/2018    Procedure: Exam under anesthesia, left foot debridement, left foot washout, possible left second through fifth metatarsal resection;  Surgeon: Mitch Wall MD;  Location: [facility] OR;   Service: Vascular;  Laterality: Left;  1:00pm start per julissa @ 8:58am  RN  PHONE PRE OP 12-27-18--=[facility] coming [facility] on [address]  NEED CONSENT    EXAMINATION UNDER ANESTHESIA Left 6/5/2019    Procedure: LEFT FOOT DEBRIDEMENT, WASHOUT AND ALL OTHER INDICATED PROCEDURES;  Surgeon: Mitch Chan MD;  Location: [facility] OR;  Service: Vascular;  Laterality: Left;  RN PRE OP 5-31-19------ICD------NEED CONSENT    EXAMINATION UNDER ANESTHESIA Right 11/9/2020    Procedure: RIGHT FOOT DEBRIDEMENT, WASHOUT AND ALL OTHER INDICATED PROCEDURES;  Surgeon: Mitch Chan MD;  Location: [facility] OR;  Service: Vascular;  Laterality: Right;  RN PREOP 10/27/2020, --COVID NEGATIVE ON 11/6, has cardiac clearance/Jacobson 10/27/2020, pt has ICD  NEEDS ORDERS    INCISION AND DRAINAGE Left 11/14/2018    Procedure: Incision and Drainage, left lower extremity debridement, washout;  Surgeon: Mitch Chan MD;  Location: [facility] OR;  Service: Vascular;  Laterality: Left;    INCISION AND DRAINAGE Left 11/16/2018    Procedure: INCISION AND DRAINAGE;  Surgeon: Mitch Chan MD;  Location: [facility] OR;  Service: Vascular;  Laterality: Left;    INCISION AND DRAINAGE Right 11/18/2020    Procedure: Debridement and washout right lower extremity wounds;  Surgeon: Mitch Chan MD;  Location: [facility] OR 2ND FLR;  Service: Vascular;  Laterality: Right;    INTRAOCULAR PROSTHESES INSERTION Right 9/26/2018    Procedure: INSERTION, IOL PROSTHESIS;  Surgeon: Perla Cortés MD;  Location: [facility] OR 1ST FLR;  Service: Ophthalmology;  Laterality: Right;    PERITONEOCENTESIS N/A 3/1/2019    Procedure: PARACENTESIS, ABDOMINAL, INITIAL;  Surgeon: [facility] Diagnostic Provider;  Location: [facility] OR;  Service: Radiology;  Laterality: N/A;    PERITONEOCENTESIS N/A 3/25/2019    Procedure: PARACENTESIS, ABDOMINAL, INITIAL;  Surgeon: [facility] Diagnostic Provider;  Location: [facility] OR;  Service: Radiology;  Laterality: N/A;    PERITONEOCENTESIS N/A  7/22/2019    Procedure: PARACENTESIS, ABDOMINAL, INITIAL;  Surgeon: Dosc Diagnostic Provider;  Location: [facility] OR;  Service: Radiology;  Laterality: N/A;    PERITONEOCENTESIS N/A 8/16/2019    Procedure: PARACENTESIS, ABDOMINAL, INITIAL;  Surgeon: Dosc Diagnostic Provider;  Location: [facility] OR;  Service: Radiology;  Laterality: N/A;    PERITONEOCENTESIS N/A 9/26/2019    Procedure: PARACENTESIS, ABDOMINAL;  Surgeon: Dosc Diagnostic Provider;  Location: [facility] OR;  Service: Radiology;  Laterality: N/A;    PERITONEOCENTESIS N/A 10/11/2019    Procedure: PARACENTESIS, ABDOMINAL;  Surgeon: Dosc Diagnostic Provider;  Location: [facility] OR;  Service: Radiology;  Laterality: N/A;    PERITONEOCENTESIS N/A 10/31/2019    Procedure: PARACENTESIS, ABDOMINAL;  Surgeon: Dosc Diagnostic Provider;  Location: [facility] OR;  Service: Radiology;  Laterality: N/A;    PERITONEOCENTESIS N/A 11/18/2019    Procedure: PARACENTESIS, ABDOMINAL;  Surgeon: Dosc Diagnostic Provider;  Location: [facility] OR;  Service: Radiology;  Laterality: N/A;    PERITONEOCENTESIS N/A 12/16/2019    Procedure: PARACENTESIS, ABDOMINAL;  Surgeon: Dosc Diagnostic Provider;  Location: [facility] OR;  Service: Radiology;  Laterality: N/A;  2PM START    PERITONEOCENTESIS N/A 2/7/2020    Procedure: PARACENTESIS, ABDOMINAL;  Surgeon: Dosc Diagnostic Provider;  Location: [facility] OR;  Service: Radiology;  Laterality: N/A;  REQUESTED 10:30A START    PERITONEOCENTESIS N/A 2/19/2020    Procedure: PARACENTESIS, ABDOMINAL;  Surgeon: Dosc Diagnostic Provider;  Location: [facility] OR;  Service: Radiology;  Laterality: N/A;    PERITONEOCENTESIS N/A 2/28/2020    Procedure: PARACENTESIS, ABDOMINAL;  Surgeon: Dosc Diagnostic Provider;  Location: [facility] OR;  Service: Radiology;  Laterality: N/A;  REQUESTED 10AM START    PHACOEMULSIFICATION OF CATARACT Right 9/26/2018    Procedure: PHACOEMULSIFICATION, CATARACT;  Surgeon: Perla Cortés MD;  Location: [facility] OR [address] [place] [address];  Service: Ophthalmology;  Laterality:  Right;    REPEAT ABDOMINAL PARACENTESIS N/A 2/11/2019    Procedure: PARACENTESIS, ABDOMINAL, REPEAT;  Surgeon: Dosc Diagnostic Provider;  Location: [facility] OR;  Service: Radiology;  Laterality: N/A;  1PM START    REPEAT ABDOMINAL PARACENTESIS N/A 9/12/2019    Procedure: PARACENTESIS, ABDOMINAL, REPEAT;  Surgeon: Dosc Diagnostic Provider;  Location: [facility] OR;  Service: Radiology;  Laterality: N/A;  9AM START    REPEAT ABDOMINAL PARACENTESIS N/A 12/2/2019    Procedure: PARACENTESIS, ABDOMINAL, REPEAT;  Surgeon: Dosc Diagnostic Provider;  Location: [facility] OR;  Service: Radiology;  Laterality: N/A;  3PM START    REPEAT ABDOMINAL PARACENTESIS N/A 1/10/2020    Procedure: PARACENTESIS, ABDOMINAL, REPEAT;  Surgeon: Dos Diagnostic Provider;  Location: [facility] OR;  Service: Radiology;  Laterality: N/A;  1130AM START    REPEAT ABDOMINAL PARACENTESIS N/A 1/20/2020    Procedure: PARACENTESIS, ABDOMINAL, REPEAT;  Surgeon: Dos Diagnostic Provider;  Location: [facility] OR;  Service: Radiology;  Laterality: N/A;  1PM START    REPEAT ABDOMINAL PARACENTESIS N/A 1/31/2020    Procedure: PARACENTESIS, ABDOMINAL, REPEAT;  Surgeon: Dos Diagnostic Provider;  Location: [facility] OR;  Service: Radiology;  Laterality: N/A;  10AM START    REPEAT ABDOMINAL PARACENTESIS N/A 3/16/2020    Procedure: PARACENTESIS, ABDOMINAL, REPEAT;  Surgeon: Dosc Diagnostic Provider;  Location: [facility] OR;  Service: Radiology;  Laterality: N/A;  10AM START    REPEAT ABDOMINAL PARACENTESIS N/A 3/30/2020    Procedure: PARACENTESIS, ABDOMINAL, REPEAT;  Surgeon: Dosc Diagnostic Provider;  Location: [facility] OR;  Service: Radiology;  Laterality: N/A;    REPEAT ABDOMINAL PARACENTESIS N/A 4/9/2020    Procedure: PARACENTESIS, ABDOMINAL, REPEAT;  Surgeon: Dos Diagnostic Provider;  Location: [facility] OR;  Service: Radiology;  Laterality: N/A;  1130AM START    REPEAT ABDOMINAL PARACENTESIS N/A 5/8/2020    Procedure: PARACENTESIS, ABDOMINAL, REPEAT;  Surgeon: Dosc Diagnostic Provider;   Location: [facility] OR;  Service: Radiology;  Laterality: N/A;  9AM START    REPEAT ABDOMINAL PARACENTESIS N/A 5/21/2020    Procedure: PARACENTESIS, ABDOMINAL, REPEAT;  Surgeon: Dosc Diagnostic Provider;  Location: [facility] OR;  Service: Radiology;  Laterality: N/A;  10AM START    REPEAT ABDOMINAL PARACENTESIS N/A 6/5/2020    Procedure: PARACENTESIS, ABDOMINAL, REPEAT;  Surgeon: Dosc Diagnostic Provider;  Location: [facility] OR;  Service: Radiology;  Laterality: N/A;  NOON START    REPEAT ABDOMINAL PARACENTESIS N/A 7/10/2020    Procedure: PARACENTESIS, ABDOMINAL, REPEAT;  Surgeon: Dosc Diagnostic Provider;  Location: [facility] OR;  Service: Radiology;  Laterality: N/A;  1PM START    REPEAT ABDOMINAL PARACENTESIS N/A 8/20/2020    Procedure: PARACENTESIS, ABDOMINAL, REPEAT;  Surgeon: Dosc Diagnostic Provider;  Location: [facility] OR;  Service: Radiology;  Laterality: N/A;  1PM START    REPEAT ABDOMINAL PARACENTESIS N/A 9/4/2020    Procedure: PARACENTESIS, ABDOMINAL, REPEAT;  Surgeon: Dosc Diagnostic Provider;  Location: [facility] OR;  Service: Radiology;  Laterality: N/A;  11 AM START    REPEAT ABDOMINAL PARACENTESIS N/A 9/16/2020    Procedure: PARACENTESIS, ABDOMINAL, REPEAT;  Surgeon: Dosc Diagnostic Provider;  Location: [facility] OR;  Service: Radiology;  Laterality: N/A;  START 2:00 P.M.    REPEAT ABDOMINAL PARACENTESIS N/A 9/28/2020    Procedure: PARACENTESIS, ABDOMINAL, REPEAT;  Surgeon: Dosc Diagnostic Provider;  Location: [facility] OR;  Service: Radiology;  Laterality: N/A;  1 P.M. START    REPEAT ABDOMINAL PARACENTESIS N/A 11/3/2020    Procedure: PARACENTESIS, ABDOMINAL, REPEAT;  Surgeon: Dosc Diagnostic Provider;  Location: [facility] OR;  Service: Radiology;  Laterality: N/A;  11AM START    REPEAT ABDOMINAL PARACENTESIS N/A 11/13/2020    Procedure: PARACENTESIS, ABDOMINAL, REPEAT;  Surgeon: Dosc Diagnostic Provider;  Location: [facility] OR;  Service: Radiology;  Laterality: N/A;  12PM START    REPEAT ABDOMINAL PARACENTESIS N/A 11/23/2020     Procedure: PARACENTESIS, ABDOMINAL, REPEAT;  Surgeon: [facility] Diagnostic Provider;  Location: [facility] OR;  Service: Radiology;  Laterality: N/A;    REPEAT ABDOMINAL PARACENTESIS N/A 12/1/2020    Procedure: PARACENTESIS, ABDOMINAL, REPEAT;  Surgeon: [facility] Diagnostic Provider;  Location: [facility] OR;  Service: Radiology;  Laterality: N/A;  11AM START    REPEAT ABDOMINAL PARACENTESIS N/A 12/8/2020    Procedure: PARACENTESIS, ABDOMINAL, REPEAT;  Surgeon: [facility] Diagnostic Provider;  Location: [facility] OR;  Service: Radiology;  Laterality: N/A;  93AM START    REVISION OF PROCEDURE INVOLVING GLAUCOMA DRAINAGE DEVICE Left 10/2/2019    EXTRUDING BAERVELDT /WITH PERICARDIAL PATCH GRAFT ()    Right foot surgery  10/2014    TOE AMPUTATION Right     first and second    TOE AMPUTATION Left 11/16/2018    Procedure: AMPUTATION, TOES  2-5;  Surgeon: Mitch Chan MD;  Location: [facility] OR;  Service: Vascular;  Laterality: Left;    TOE AMPUTATION Left 12/5/2018    Procedure: AMPUTATION, TOE;  Surgeon: Mitch Chan MD;  Location: [facility] OR;  Service: Vascular;  Laterality: Left;  left great toe    TONSILLECTOMY         ALLERGIES AND MEDICATION:     Review of patient's allergies indicates:   Allergen Reactions    Statins-hmg-coa reductase inhibitors      Generalized Pain    Onglyza [saxagliptin]     Penicillins Rash        Medication List          Accurate as of December 11, 2020 10:52 AM. If you have any questions, ask your nurse or doctor.            CONTINUE taking these medications    allopurinoL 300 MG tablet  Commonly known as: ZYLOPRIM  Take 1 tablet (300 mg total) by mouth once daily.     aspirin 81 MG EC tablet  Commonly known as: ECOTRIN     bisacodyL 5 mg EC tablet  Commonly known as: DULCOLAX     brimonidine 0.2% 0.2 % Drop  Commonly known as: ALPHAGAN  Place 1 drop into both eyes 3 (three) times daily.     carvediloL 3.125 MG tablet  Commonly known as: COREG  TAKE 1 TABLET(3.125 MG) BY MOUTH TWICE DAILY    "  coenzyme Q10 100 mg capsule     dorzolamide-timolol 2-0.5% 22.3-6.8 mg/mL ophthalmic solution  Commonly known as: COSOPT  Place 1 drop into both eyes 3 (three) times daily.     ezetimibe 10 mg tablet  Commonly known as: ZETIA  Take 1 tablet (10 mg total) by mouth once daily.     fish oil-omega-3 fatty acids 300-1,000 mg capsule  Take 1 capsule by mouth 2 (two) times daily.     gabapentin 100 MG capsule  Commonly known as: NEURONTIN  TAKE 2 CAPSULES(200 MG) BY MOUTH EVERY EVENING     HYDROcodone-acetaminophen 7.5-325 mg per tablet  Commonly known as: NORCO  Take 1 tablet by mouth every 8 (eight) hours as needed for Pain.     multivitamin Tab     * oxyCODONE 5 MG immediate release tablet  Commonly known as: ROXICODONE  Take 1 tablet (5 mg total) by mouth every 6 (six) hours as needed for Pain.     * oxyCODONE 5 MG immediate release tablet  Commonly known as: ROXICODONE  Take 1 tablet (5 mg total) by mouth every 6 (six) hours as needed for Pain.     pen needle, diabetic 32 gauge x 5/32" Ndle  Commonly known as: BD ULTRA-FINE AMADOR PEN NEEDLE  1 Device by Misc.(Non-Drug; Combo Route) route 2 (two) times a day.     SantyL ointment  Generic drug: collagenase  Apply topically once daily.     torsemide 20 MG Tab  Commonly known as: DEMADEX  Take 4 tablets (80 mg total) by mouth 2 (two) times daily.     XULTOPHY 100/3.6 100 unit-3.6 mg /mL (3 mL) Inpn  Generic drug: insulin degludec-liraglutide  Inject 35 Units into the skin once daily.         * This list has 2 medication(s) that are the same as other medications prescribed for you. Read the directions carefully, and ask your doctor or other care provider to review them with you.                SOCIAL HISTORY:     Social History     Socioeconomic History    Marital status: Single     Spouse name: Not on file    Number of children: Not on file    Years of education: 14    Highest education level: Not on file   Occupational History    Not on file   Social Needs    " Financial resource strain: Not on file    Food insecurity     Worry: Not on file     Inability: Not on file    Transportation needs     Medical: Not on file     Non-medical: Not on file   Tobacco Use    Smoking status: Former Smoker     Packs/day: 1.00     Years: 3.00     Pack years: 3.00     Types: Cigarettes     Quit date: 1984     Years since quittin.4    Smokeless tobacco: Never Used   Substance and Sexual Activity    Alcohol use: Not Currently     Alcohol/week: 1.0 standard drinks     Types: 1 Cans of beer per week     Comment: rarely    Drug use: No    Sexual activity: Not Currently     Partners: Female   Lifestyle    Physical activity     Days per week: Not on file     Minutes per session: Not on file    Stress: Not on file   Relationships    Social connections     Talks on phone: Not on file     Gets together: Not on file     Attends Sikhism service: Not on file     Active member of club or organization: Not on file     Attends meetings of clubs or organizations: Not on file     Relationship status: Not on file   Other Topics Concern    Not on file   Social History Narrative    Not on file       FAMILY HISTORY:     Family History   Problem Relation Age of Onset    Diabetes Mother     Heart disease Brother     No Known Problems Father     No Known Problems Sister     No Known Problems Maternal Aunt     No Known Problems Maternal Uncle     No Known Problems Paternal Aunt     No Known Problems Paternal Uncle     No Known Problems Maternal Grandmother     No Known Problems Maternal Grandfather     No Known Problems Paternal Grandmother     No Known Problems Paternal Grandfather     Anemia Neg Hx     Arrhythmia Neg Hx     Asthma Neg Hx     Clotting disorder Neg Hx     Fainting Neg Hx     Heart attack Neg Hx     Heart failure Neg Hx     Hyperlipidemia Neg Hx     Hypertension Neg Hx     Stroke Neg Hx     Atrial Septal Defect Neg Hx     Amblyopia Neg Hx     Blindness  "Neg Hx     Glaucoma Neg Hx     Macular degeneration Neg Hx     Retinal detachment Neg Hx     Strabismus Neg Hx        REVIEW OF SYSTEMS:   Review of Systems   Constitutional: Negative for chills, diaphoresis, fever and malaise/fatigue.   HENT: Negative for nosebleeds.    Eyes: Negative for blurred vision, double vision and photophobia.   Respiratory: Negative for hemoptysis, shortness of breath and wheezing.    Cardiovascular: Negative for chest pain, palpitations, orthopnea, claudication, leg swelling and PND.   Gastrointestinal: Negative for abdominal pain, blood in stool, heartburn, melena, nausea and vomiting.   Genitourinary: Negative for flank pain and hematuria.   Musculoskeletal: Negative for falls, myalgias and neck pain.   Skin: Negative for rash.   Neurological: Negative for dizziness, seizures, loss of consciousness, weakness and headaches.   Endo/Heme/Allergies: Negative for polydipsia. Does not bruise/bleed easily.   Psychiatric/Behavioral: Negative for depression and memory loss. The patient is not nervous/anxious.        PHYSICAL EXAM:     Vitals:    12/11/20 1041   BP: 120/70   Pulse: 79   Resp: 15    Body mass index is 29.41 kg/m².  Weight: 93 kg (205 lb)   Height: 5' 10" (177.8 cm)     Physical Exam  Vitals signs reviewed.   Constitutional:       General: He is not in acute distress.     Appearance: He is well-developed. He is ill-appearing. He is not toxic-appearing or diaphoretic.   HENT:      Head: Normocephalic and atraumatic.   Eyes:      General: No scleral icterus.     Conjunctiva/sclera: Conjunctivae normal.      Pupils: Pupils are equal, round, and reactive to light.   Neck:      Musculoskeletal: Normal range of motion and neck supple. No edema or neck rigidity.      Thyroid: No thyromegaly.      Vascular: No JVD.      Trachea: Trachea normal. No tracheal deviation.   Cardiovascular:      Rate and Rhythm: Normal rate and regular rhythm.      Chest Wall: PMI is not displaced.      " Heart sounds: S1 normal and S2 normal. No murmur. No friction rub. No gallop.    Pulmonary:      Effort: Pulmonary effort is normal. No respiratory distress.      Breath sounds: Normal breath sounds. No stridor. No wheezing, rhonchi or rales.   Chest:      Chest wall: No tenderness.   Abdominal:      General: There is no distension.      Palpations: Abdomen is soft.   Musculoskeletal: Normal range of motion.         General: No swelling or tenderness.      Comments: bilat feet in boots/dressings   Feet:      Right foot:      Skin integrity: No ulcer.      Left foot:      Skin integrity: No ulcer.   Skin:     General: Skin is warm and dry.      Findings: No erythema or rash.   Neurological:      Mental Status: He is alert and oriented to person, place, and time.      Cranial Nerves: No cranial nerve deficit.   Psychiatric:         Speech: Speech normal.         Behavior: Behavior normal. Behavior is cooperative.         DATA:   EKG: (personally reviewed tracing(s))  12/11/20 SR 79, PVCs, ASMI-age indet, similar to 10/27/20    Laboratory:  CBC:  Recent Labs   Lab 08/25/20  1016 09/29/20  0949 10/27/20  1000   WBC 10.60 8.20 6.66   Hemoglobin 11.8 L 12.3 L 11.5 L   Hematocrit 37.4 L 39.8 L 36.0 L   Platelets 323 325 264       CHEMISTRIES:  Recent Labs   Lab 03/12/19  0558  04/23/19  1255  05/31/19  1025  09/29/20  0949 10/13/20  1155 10/27/20  1000   Glucose 78   < > 111 H   < > 135 H   < > 129 H 152 H 87   Sodium 138   < > 133 L   < > 133 L   < > 137 142 138   Potassium 3.2 L   < > 3.4 L   < > 2.7 LL   < > 3.8 4.5 4.6   BUN 71 H   < > 112 H   < > 64 H   < > 69 H 46 H 59 H   Creatinine 1.2   < > 1.9 H   < > 1.3   < > 1.5 H 1.3 1.5 H   eGFR if  >60.0   < > 43 A   < > >60   < > 57 A >60 57 A   eGFR if non  >60.0   < > 37 A   < > 59 A   < > 49 A 58 A 49 A   Calcium 8.9   < > 9.3   < > 9.3   < > 8.3 L 8.1 L 8.9   Magnesium 1.8  --  1.7  --  2.1  --   --   --   --     < > = values in this  interval not displayed.       CARDIAC BIOMARKERS:  Recent Labs   Lab 12/11/18  2024 12/12/18  0226  01/30/19  1630 02/01/19  1100 02/05/19  1530 03/11/19  0335   CPK  --   --    < > 598 H 384 H 198  --    Troponin I 0.449 H 0.338 H  --   --   --   --  0.013    < > = values in this interval not displayed.       COAGS:  Recent Labs   Lab 03/10/19  2349 04/23/19  1255 05/31/19  1025   INR 1.0 1.0 1.1       LIPIDS/LFTS:  Recent Labs   Lab 07/27/18  0820  10/30/18  0955  08/25/20  1016 09/04/20  0814 09/29/20  0949 10/27/20  1000   Cholesterol 151  --  107 L  --   --  125  --   --    Triglycerides 83  --  44  --   --  54  --   --    HDL 29 L  --  27 L  --   --  30 L  --   --    LDL Cholesterol 105.4  --  71.2  --   --  84.2  --   --    Non-HDL Cholesterol 122  --  80  --   --  95  --   --    AST 27   < >  --    < > 21  --  19 15   ALT 28   < >  --    < > 30  --  13 12    < > = values in this interval not displayed.       Cardiovascular Testing:  LE art US/SIDRA 11/4/19  1. Right lower extremity arterial ultrasound shows no hemodynamically significant stenosis.  Pressures indicate moderate arterial occlusive disease.  SIDRA 1.85.  2. Left lower extremity arterial ultrasound shows no hemodynamically significant stenosis.  Pressures indicate moderate to severe arterial occlusive disease. SIDRA 1.85.    LE venous US/reflux 8/9/19  No evidence of lower extremity DVT bilaterally.  R GSV reflux (below knee).   vein noted below knee.  L GSV reflux (below knee).   vein noted below knee.  L Giacomini vein reflux.    Echo 12/11/18  · Severely decreased left ventricular systolic function. The estimated ejection fraction is 20%  · Severe global hypokinetic wall motion. Cannot exclude RWMA.  · Septal wall has abnormal motion. Systolic and diastolic flattening of the interventricular septum consistent with right ventricle pressure and volume overload.  · Left ventricular diastolic dysfunction.  · Mild right ventricular  enlargement.  · Mildly to moderately reduced right ventricular systolic function.  · Moderate tricuspid regurgitation.  · There is a large left pleural effusion.    L MPI 10/17/19  · Abnormal myocardial perfusion study  · There is a moderate amount of infarct in the inferior wall(s).In the typical distribution of the RCA territory.  · Post Stress Ejection Fraction is 17 %    Cath 5/6/16 (images personally reviewed and interpreted) (subsequently seen by Dr. Mathews 5/12/16 and deemed to not be a surgical candidate)  B. Summary/Post-Operative Diagnosis    Three vessel coronary artery disease.    LV systolic and diastolic dysfunction.  D. Hemodynamic Results  LVEDP (Pre): 25 mmHg  LVEDP (Post): 30 mmHg  Ejection Fraction: 35%  E. Angiographic Results       Patient has a right dominant coronary artery.      - Left Main Coronary Artery:             The LM has luminal irregularities. There is BAKARI 3 flow.     - Left Anterior Descending Artery:             The proximal LAD has a 80% stenosis. There is BAKARI 3 flow.             The mid LAD has a 90% stenosis. There is BAKARI 3 flow. The remaining portion of the vessel is diffusely diseased.     - Left Circumflex Artery:             The mid LCX has a 60% stenosis. There is BAKARI 3 flow. The remaining portion of the vessel has luminal irregularities.     - Right Coronary Artery:             The proximal RCA has a 60% stenosis. There is BAKARI 3 flow.             The mid RCA has a 80% stenosis. There is BAKARI 3 flow. The remaining portion of the vessel has luminal irregularities.     - Posterior Descending Artery:             The proximal PDA has a 60% stenosis. There is BAKARI 3 flow. The remaining portion of the vessel has luminal irregularities.     - Radial:             The radial.  G. Recommendations  1. Routine post-cath care.  2. Risk factor reductions.  3. ASA 81mg.  4. Statin therapy.  5. Consult CV Surgery.      ASSESSMENT:   # CAD/ICM, clinically stable/euvolemic  # [facility]  ICD, functioning normally  # Statin allergy on zetia  # PAD followed by Dr. Chan  # CKD3a  # preop CV eval prior to RLE wound debridement    PLAN:   Continue medical therapy.  The patient is at moderate cardiac risk for the planned surgery and general anesthesia as deemed necessary.  No further preoperative cardiac testing is required at this juncture.  Cont aspirin 81 mg daily.  OK to hold periop if absolutely necessary.  If sxs develop in future, consider Cardiac PET Viab study.  RTC 1 month with [facility] ICD check (Jan 2021), [email or web address] in interim.      Ismael Romano MD, FACC

## 2020-12-11 NOTE — PROGRESS NOTES
[facility]                Progress Note    Subjective:       Patient ID: Marvin Ray is a 60 y.o. male.    Chief Complaint: No chief complaint on file.    Patient ambulated to exam bed from wheelchair for short distance. Reports that he is still limiting his activities. Note large amount of thick green drainage from Left TMA/Medial Foot that is new. Dressing last changed 2 days ago per sister using iodosorb as prescribed. Right Medial Malleolus unchanged. New blistered area to Right Anterior Lower Leg that is superficial and appeared prior to patient's paracentesis. Right Heel wound is unchanged. Bone remains exposed with avascular tissue formation. Right 3rd Toe Ulcer unchanged. Continuing antibiotic powder/cream to Right Heel/3rd Toe and adding it to Left Foot this week. Cleaned all wounds with acetic acid to address suspected pseudomonas. Discussed MD Cahn's recommendation for Podiatry to debride Right Heel wound with graft placement over bone. FRANCINE Champion will perform surgery and consent was completed. Message sent to patient's PCP MD Page regarding need for patient to be approved for surgery and appt set with NP at [facility].       Review of Systems   Constitutional: Negative.  Negative for activity change, appetite change, chills, fatigue and fever.   HENT: Negative.    Eyes: Negative.    Respiratory: Negative.  Negative for cough and shortness of breath.    Cardiovascular: Positive for leg swelling. Negative for chest pain.   Gastrointestinal: Negative for abdominal pain, anal bleeding, blood in stool, constipation, diarrhea, nausea and vomiting.   Musculoskeletal: Positive for joint swelling and myalgias.   Skin: Positive for wound.   Neurological: Positive for numbness. Negative for weakness.        + paresthesia    Psychiatric/Behavioral: Negative.          Objective:        Physical Exam  Vitals signs reviewed.   Constitutional:        Appearance: Normal appearance.   HENT:      Head: Normocephalic and atraumatic.      Nose: Nose normal.      Mouth/Throat:      Mouth: Mucous membranes are moist.      Pharynx: Oropharynx is clear.   Eyes:      Extraocular Movements: Extraocular movements intact.      Pupils: Pupils are equal, round, and reactive to light.   Neck:      Musculoskeletal: Normal range of motion and neck supple.   Cardiovascular:      Rate and Rhythm: Normal rate and regular rhythm.   Pulmonary:      Effort: Pulmonary effort is normal. No respiratory distress.      Breath sounds: No wheezing.   Abdominal:      General: There is distension.      Palpations: Abdomen is soft.      Tenderness: There is no abdominal tenderness. There is no guarding.   Musculoskeletal:      Right lower leg: Edema present.      Left lower leg: Edema present.      Comments: Fat pad atrophy to heels and met heads bilateral     Skin:     General: Skin is warm and dry.      Findings: Wound (see description) present.   Neurological:      Mental Status: He is alert and oriented to person, place, and time.      Sensory: No sensory deficit.         Vitals:    12/11/20 1030   BP: 127/60   Pulse: 78   Temp: 97.5 °F (36.4 °C)       Assessment:           ICD-10-CM ICD-9-CM   1. DM type 2 with diabetic peripheral neuropathy  E11.42 250.60     357.2   2. Type 2 diabetes mellitus with left diabetic foot ulcer  E11.621 250.80    L97.529 707.15   3. Atherosclerosis of left lower extremity with ulceration of midfoot  I70.244 440.23     707.14   4. Diabetic ulcer of right lower leg  E11.622 250.80    L97.919 707.10   5. Diabetic ulcer of toe of right foot associated with type 2 diabetes mellitus, with bone involvement without evidence of necrosis  E11.621 250.80    L97.516 707.15   6. Type 2 diabetes mellitus with right diabetic foot ulcer  E11.621 250.80    L97.519 707.15   7. Atherosclerosis of native artery of right lower extremity with ulceration of ankle  I70.233 440.23      707.13   8. Venous stasis ulcer with edema of lower leg  I83.009 454.0    I83.899 782.3    L97.909     R60.9    9. Atherosclerosis of native artery of right leg with ulceration  I70.239 440.23     707.9            Wound 11/02/18 Diabetic Ulcer Left medial Foot (Active)   11/02/18     Pre-existing: Yes   Primary Wound Type: Diabetic ulc   Side: Left   Orientation: medial   Location: Foot   Wound Number (optional):    Ankle-Brachial Index:    Pulses:    Removal Indication and Assessment:    Wound Outcome:    (Retired) Wound Type:    (Retired) Wound Length (cm):    (Retired) Wound Width (cm):    (Retired) Depth (cm):    Wound Description (Comments):    Removal Indications:    Wound Image   12/11/20 0900   Wound WDL ex 12/11/20 0900   Dressing Appearance Moist drainage 12/11/20 0900   Drainage Amount Large 12/11/20 0900   Drainage Characteristics/Odor Green 12/11/20 0900   Appearance Pink;Red;Yellow 12/11/20 0900   Tissue loss description Full thickness 12/11/20 0900   Red (%), Wound Tissue Color 90 % 12/11/20 0900   Yellow (%), Wound Tissue Color 10 % 12/11/20 0900   Periwound Area Scar tissue 12/11/20 0900   Wound Edges Open 12/11/20 0900   Wound Length (cm) 5.1 cm 12/11/20 0900   Wound Width (cm) 7.8 cm 12/11/20 0900   Wound Depth (cm) 0.2 cm 12/11/20 0900   Wound Volume (cm^3) 7.96 cm^3 12/11/20 0900   Wound Surface Area (cm^2) 39.78 cm^2 12/11/20 0900   Care Cleansed with:;Soap and water;Sterile normal saline;Antimicrobial agent 12/11/20 0900   Dressing Changed 12/11/20 0900   Periwound Care Moisture barrier applied 12/11/20 0900   Off Loading Football dressing 12/11/20 0900            Wound 05/08/20 1015 Diabetic Ulcer Left distal Foot (Active)   05/08/20 1015    Pre-existing: Yes   Primary Wound Type: Diabetic ulc   Side: Left   Orientation: distal   Location: Foot   Wound Number (optional):    Ankle-Brachial Index:    Pulses:    Removal Indication and Assessment:    Wound Outcome:    (Retired) Wound Type:     (Retired) Wound Length (cm):    (Retired) Wound Width (cm):    (Retired) Depth (cm):    Wound Description (Comments):    Removal Indications:    Wound Image   12/11/20 0900   Wound WDL ex 12/11/20 0900   Dressing Appearance Moist drainage 12/11/20 0900   Drainage Amount Large 12/11/20 0900   Drainage Characteristics/Odor Green 12/11/20 0900   Appearance Pink;Red;Yellow 12/11/20 0900   Tissue loss description Full thickness 12/11/20 0900   Red (%), Wound Tissue Color 90 % 12/11/20 0900   Yellow (%), Wound Tissue Color 10 % 12/11/20 0900   Periwound Area Scar tissue 12/11/20 0900   Wound Length (cm) 2.7 cm 12/11/20 0900   Wound Width (cm) 5.6 cm 12/11/20 0900   Wound Depth (cm) 0.2 cm 12/11/20 0900   Wound Volume (cm^3) 3.02 cm^3 12/11/20 0900   Wound Surface Area (cm^2) 15.12 cm^2 12/11/20 0900   Care Cleansed with:;Antimicrobial agent;Soap and water;Sterile normal saline 12/11/20 0900   Dressing Changed 12/11/20 0900   Periwound Care Moisture barrier applied 12/11/20 0900   Off Loading Football dressing 12/11/20 0900            Wound 08/11/20 1051 Diabetic Ulcer Right medial Malleolus/Ankle (Active)   08/11/20 1051    Pre-existing: No   Primary Wound Type: Diabetic ulc   Side: Right   Orientation: medial   Location: Malleolus/Ankle   Wound Number (optional):    Ankle-Brachial Index:    Pulses:    Removal Indication and Assessment:    Wound Outcome:    (Retired) Wound Type:    (Retired) Wound Length (cm):    (Retired) Wound Width (cm):    (Retired) Depth (cm):    Wound Description (Comments):    Removal Indications:    Wound Image   12/11/20 0900   Wound WDL ex 12/11/20 0900   Dressing Appearance Moist drainage 12/11/20 0900   Drainage Amount Small 12/11/20 0900   Drainage Characteristics/Odor Serosanguineous 12/11/20 0900   Appearance Pink;Not granulating 12/11/20 0900   Tissue loss description Partial thickness 12/11/20 0900   Red (%), Wound Tissue Color 100 % 12/11/20 0900   Periwound Area Intact 12/11/20 0900    Wound Edges Open 12/11/20 0900   Wound Length (cm) 1.1 cm 12/11/20 0900   Wound Width (cm) 1.5 cm 12/11/20 0900   Wound Depth (cm) 0.15 cm 12/11/20 0900   Wound Volume (cm^3) 0.25 cm^3 12/11/20 0900   Wound Surface Area (cm^2) 1.65 cm^2 12/11/20 0900   Care Cleansed with:;Antimicrobial agent;Soap and water;Sterile normal saline 12/11/20 0900   Dressing Changed 12/11/20 0900   Periwound Care Skin barrier film applied 12/11/20 0900            Wound 12/11/20 0931 Venous Ulcer Right lower Leg (Active)   12/11/20 0931    Pre-existing: Yes   Primary Wound Type: Venous ulcer   Side: Right   Orientation: lower   Location: Leg   Wound Number (optional):    Ankle-Brachial Index:    Pulses:    Removal Indication and Assessment:    Wound Outcome:    (Retired) Wound Type:    (Retired) Wound Length (cm):    (Retired) Wound Width (cm):    (Retired) Depth (cm):    Wound Description (Comments):    Removal Indications:    Wound Image   12/11/20 0900   Wound WDL ex 12/11/20 0900   Dressing Appearance Moist drainage 12/11/20 0900   Drainage Amount Moderate 12/11/20 0900   Drainage Characteristics/Odor Serous 12/11/20 0900   Appearance Blistered;Red 12/11/20 0900   Tissue loss description Partial thickness 12/11/20 0900   Red (%), Wound Tissue Color 100 % 12/11/20 0900   Periwound Area Swelling 12/11/20 0900   Wound Edges Jagged 12/11/20 0900   Wound Length (cm) 2.2 cm 12/11/20 0900   Wound Width (cm) 3.2 cm 12/11/20 0900   Wound Depth (cm) 0.1 cm 12/11/20 0900   Wound Volume (cm^3) 0.7 cm^3 12/11/20 0900   Wound Surface Area (cm^2) 7.04 cm^2 12/11/20 0900   Care Cleansed with:;Soap and water;Sterile normal saline;Antimicrobial agent 12/11/20 0900   Dressing Applied;Foam;Silver 12/11/20 0900   Periwound Care Skin barrier film applied 12/11/20 0900            Incision/Site 06/16/20 0930 Right Toe, third anterior (Active)   06/16/20 0930   Present Prior to Hospital Arrival?: Yes   Side: Right   Location: Toe, third   Orientation:  anterior   Incision Type:    Closure Method:    Additional Comments:    Removal Indication and Assessment:    Wound Outcome:    Removal Indications:    Wound Image   12/11/20 0900   Incision WDL ex 12/11/20 0900   Dressing Appearance Dry;Intact 12/11/20 0900   Drainage Amount Scant 12/11/20 0900   Drainage Characteristics/Odor Creamy;Sanguineous 12/11/20 0900   Appearance Pink;Not granulating 12/11/20 0900   Red (%), Wound Tissue Color 100 % 12/11/20 0900   Periwound Area Intact 12/11/20 0900   Wound Edges Defined 12/11/20 0900   Wound Length (cm) 0.2 cm 12/11/20 0900   Wound Width (cm) 0.4 cm 12/11/20 0900   Wound Depth (cm) 0.2 cm 12/11/20 0900   Wound Volume (cm^3) 0.02 cm^3 12/11/20 0900   Wound Surface Area (cm^2) 0.08 cm^2 12/11/20 0900   Care Cleansed with:;Antimicrobial agent;Soap and water;Sterile normal saline 12/11/20 0900   Dressing Changed 12/11/20 0900   Off Loading Football dressing;Off loading shoe 12/11/20 0900            Incision/Site 11/09/20 1240 Right Leg (Active)   11/09/20 1240   Present Prior to Hospital Arrival?:    Side: Right   Location: Leg   Orientation:    Incision Type:    Closure Method:    Additional Comments:    Removal Indication and Assessment:    Wound Outcome:    Removal Indications:    Incision WDL WDL 12/11/20 0900   Appearance Epithelialization 12/11/20 0900   Wound Edges Rolled/closed 12/11/20 0900   Wound Length (cm) 0 cm 12/11/20 0900   Wound Width (cm) 0 cm 12/11/20 0900   Wound Depth (cm) 0 cm 12/11/20 0900   Wound Volume (cm^3) 0 cm^3 12/11/20 0900   Wound Surface Area (cm^2) 0 cm^2 12/11/20 0900   Care Cleansed with:;Soap and water;Sterile normal saline 12/11/20 0900   Dressing Changed;Foam 12/11/20 0900   Periwound Care Skin barrier film applied 12/11/20 0900            Incision/Site 11/18/20 1458 Right Heel (Active)   11/18/20 145   Present Prior to Hospital Arrival?:    Side: Right   Location: Heel   Orientation:    Incision Type:    Closure Method:    Additional  Comments:    Removal Indication and Assessment:    Wound Outcome:    Removal Indications:    Wound Image   12/11/20 0900   Incision WDL ex 12/11/20 0900   Dressing Appearance Moist drainage 12/11/20 0900   Drainage Amount Moderate 12/11/20 0900   Drainage Characteristics/Odor Serosanguineous 12/11/20 0900   Appearance Yellow;Slough;Adipose;Bone;Red;Not granulating 12/11/20 0900   Red (%), Wound Tissue Color 10 % 12/11/20 0900   Yellow (%), Wound Tissue Color 90 % 12/11/20 0900   Periwound Area Intact 12/11/20 0900   Wound Edges Open;Defined 12/11/20 0900   Wound Length (cm) 10 cm 12/11/20 0900   Wound Width (cm) 9.4 cm 12/11/20 0900   Wound Depth (cm) 1.6 cm 12/11/20 0900   Wound Volume (cm^3) 150.4 cm^3 12/11/20 0900   Wound Surface Area (cm^2) 94 cm^2 12/11/20 0900   Undermining (depth (cm)/location) 1.2 (cm) at 6 o'clock 12/11/20 0900   Care Cleansed with:;Soap and water;Sterile normal saline;Antimicrobial agent 12/11/20 0900   Dressing Changed 12/11/20 0900   Periwound Care Moisture barrier applied 12/11/20 0900   Off Loading Football dressing;Off loading shoe 12/11/20 0900           Plan:            Greater than 50% of this visit spent on counseling and coordination of care.    Education about the prevention of limb loss.    Discussed wound healing cycle, skin integrity, ways to care for skin.Counseled patient on the effects of PAD and high blood glucose on healing. He verbalizes understanding that it can increase the chances of delayed healing and this prolonged exposure leads to infection or progression of infection which subsequently can result in loss of limb.    The wound is cleansed of foreign material as much as possible and the base inspected for bone or abscess.     Patient is s/p debridement by vascular surgery.  New areas of granulation noted however dense fibrin remains. Debridement deferred to the right heel, I am of the belief that if I were to debride all the fibrin is tissue of the heel his  calcaneus would be exposed. I believe patient will benefit from application of skin substitute.      Plan for surgical debridement with trocar biopsy next week and skin substitute application.    The risks, benefits, complications, treatment options, and expected outcomes were discussed with the patient. The risks and potential complications of their problem and purposed treatment include but are not limited to infection, nerve injury, vascular injury, persistent pain, potential skin necrosis, deep vein thrombosis, possible pulmonary embolus,and complications of the anesthetics.  The patient concurred with the proposed plan, giving informed consent.  The site of surgery properly noted/marked.    In the meantime will continue with Santyl for debridement.    Left foot seems to be improving however right is stagnate.     Patient also has exposed bone and joint of the right 3rd digit.     He is recommended to float heels at all times    Short-term goals include maintaining good offloading and minimizing bioburden, promoting granulation and epithelialization to healing.  Long-term goals include keeping the wound healed by good offloading and medical management under the direction of internist.      Orders Placed This Encounter   Procedures    Change dressing     Right Malleolus, Right Shin, Left Medial Foot, Left Distal Foot: clean with saline. Apply antibiotic powder to wound. Cover Right malleolus and Right shin with foam border and Left foot with ABD, kerlix, stockinet. Change daily per sister.  Right Heel: clean with saline. Apply mixed santyl/antibiotic powder to wound with ABD, kerlix, stockinet changed per sister daily.   Right 3rd toe: clean with saline, antibiotic powder to wound daily per sister. Cover with cast padding/stockient.    Case Request Operating Room: IRRIGATION AND DEBRIDEMENT, Biopsy- trocar biopsy     Standing Status:   Standing     Number of Occurrences:   1     Order Specific Question:    Medical Necessity:     Answer:   Medically Non-Urgent [100]     Order Specific Question:   CPT Code:     Answer:   RI DEBRIDEMENT, SKIN, SUB-Q TISSUE,MUSCLE,BONE,=<20 SQ CM [66059]     Order Specific Question:   CPT Code:     Answer:   RI BONE BIOPSY,TROCAR/NEEDLE SUPERF [20220]     Order Specific Question:   Positioning:     Answer:   Supine [1004]     Order Specific Question:   Post-Procedure Disposition:     Answer:   PACU [1]     Order Specific Question:   Expected Duration (including turnover):     Answer:   800     Order Specific Question:   Implant Required:     Answer:   Yes [1000]     Order Specific Question:   Has Vendor been notified:     Answer:   Yes [100]     Comments:   [facility]     Order Specific Question:   Is an on-site pathologist required for this procedure?     Answer:   Adequacy        Follow up in about 2 weeks (around 12/25/2020) for wound care.

## 2020-12-15 ENCOUNTER — OFFICE VISIT (OUTPATIENT)
Dept: FAMILY MEDICINE | Facility: CLINIC | Age: 60
End: 2020-12-15
Payer: MEDICAID

## 2020-12-15 VITALS
WEIGHT: 205 LBS | BODY MASS INDEX: 29.35 KG/M2 | OXYGEN SATURATION: 97 % | RESPIRATION RATE: 16 BRPM | TEMPERATURE: 98 F | DIASTOLIC BLOOD PRESSURE: 74 MMHG | HEART RATE: 74 BPM | SYSTOLIC BLOOD PRESSURE: 124 MMHG | HEIGHT: 70 IN

## 2020-12-15 DIAGNOSIS — I25.10 CORONARY ARTERY DISEASE INVOLVING NATIVE CORONARY ARTERY OF NATIVE HEART WITHOUT ANGINA PECTORIS: ICD-10-CM

## 2020-12-15 DIAGNOSIS — I70.233 ATHEROSCLEROSIS OF NATIVE ARTERY OF RIGHT LOWER EXTREMITY WITH ULCERATION OF ANKLE: Primary | ICD-10-CM

## 2020-12-15 DIAGNOSIS — N18.30 CKD STAGE 3 DUE TO TYPE 2 DIABETES MELLITUS: ICD-10-CM

## 2020-12-15 DIAGNOSIS — I70.234 ATHEROSCLEROSIS OF NATIVE ARTERY OF RIGHT LOWER EXTREMITY WITH ULCERATION OF HEEL: ICD-10-CM

## 2020-12-15 DIAGNOSIS — E11.622 DIABETIC ULCER OF RIGHT LOWER LEG: ICD-10-CM

## 2020-12-15 DIAGNOSIS — I10 ESSENTIAL HYPERTENSION: Chronic | ICD-10-CM

## 2020-12-15 DIAGNOSIS — E11.22 CKD STAGE 3 DUE TO TYPE 2 DIABETES MELLITUS: ICD-10-CM

## 2020-12-15 DIAGNOSIS — R20.2 PARESTHESIA: ICD-10-CM

## 2020-12-15 DIAGNOSIS — I25.5 ISCHEMIC CARDIOMYOPATHY: ICD-10-CM

## 2020-12-15 DIAGNOSIS — I73.9 PVD (PERIPHERAL VASCULAR DISEASE): Chronic | ICD-10-CM

## 2020-12-15 DIAGNOSIS — Z01.818 PREOP EXAMINATION: Primary | ICD-10-CM

## 2020-12-15 DIAGNOSIS — L97.919 DIABETIC ULCER OF RIGHT LOWER LEG: ICD-10-CM

## 2020-12-15 PROCEDURE — 99999 PR PBB SHADOW E&M-EST. PATIENT-LVL IV: ICD-10-PCS | Mod: PBBFAC,,, | Performed by: NURSE PRACTITIONER

## 2020-12-15 PROCEDURE — 99214 OFFICE O/P EST MOD 30 MIN: CPT | Mod: S$PBB,,, | Performed by: NURSE PRACTITIONER

## 2020-12-15 PROCEDURE — 99214 OFFICE O/P EST MOD 30 MIN: CPT | Mod: PBBFAC,PN | Performed by: NURSE PRACTITIONER

## 2020-12-15 PROCEDURE — 99214 PR OFFICE/OUTPT VISIT, EST, LEVL IV, 30-39 MIN: ICD-10-PCS | Mod: S$PBB,,, | Performed by: NURSE PRACTITIONER

## 2020-12-15 PROCEDURE — 99999 PR PBB SHADOW E&M-EST. PATIENT-LVL IV: CPT | Mod: PBBFAC,,, | Performed by: NURSE PRACTITIONER

## 2020-12-15 RX ORDER — GABAPENTIN 100 MG/1
CAPSULE ORAL
Qty: 60 CAPSULE | Refills: 1 | Status: SHIPPED | OUTPATIENT
Start: 2020-12-15 | End: 2021-02-23 | Stop reason: SDUPTHER

## 2020-12-15 RX ORDER — COLLAGENASE SANTYL 250 [ARB'U]/G
OINTMENT TOPICAL DAILY
Qty: 90 G | Refills: 0 | Status: SHIPPED | OUTPATIENT
Start: 2020-12-15 | End: 2021-02-23 | Stop reason: SDUPTHER

## 2020-12-15 RX ORDER — LORATADINE 10 MG/1
10 TABLET ORAL DAILY PRN
COMMUNITY

## 2020-12-15 NOTE — PROGRESS NOTES
Routine Office Visit    Patient Name: Marvin Ray    : 1960  MRN: [id number]    Chief Complaint:  Preoperative exam    Subjective:  Marvin is a 60 y.o. male who presents today for:    1.  Preoperative exam:  Patient reports today for preoperative exam.  He is scheduled for a right pedal wound debridement with trocar biopsy with Dr. Champion on .  This procedure will be performed under monitored anesthesia.  He has a history of hypertension, hyperlipidemia, CAD, ischemic cardiomyopathy (ICD in place), type 2 diabetes, chronic kidney disease, and statin myopathy currently on Zetia.  He does have a history of frequent ascites requiring paracentesis which is from his heart disease he reports.  He denies any history of significant liver disease, although review of his chart does note some fatty infiltration of the liver on previous imaging.  Denies any history of stroke or problems with anesthesia in the past.  He is taking a baby aspirin and per his cardiologist note it is okay to hold this if absolutely necessary.  He denies any chest pain, shortness of breath, palpitations, or wheezing today.  He does use insulin for his diabetes (last A1C 7.1).  He is with his sister today who helps with his care.  He denies any new problems or symptoms at today's visit.  He has received clearance from cardiology already.  I have reviewed his medications with him.    Past Medical History  Past Medical History:   Diagnosis Date    Arthritis     Cellulitis     CKD (chronic kidney disease), stage III     Coronary artery disease     Diabetes mellitus     Diabetic retinopathy     Diabetic ulcer of left foot     Glaucoma     Gout     Hyperlipemia     Hypertension     ICD (implantable cardioverter-defibrillator) in place 2018    Left chest    Non-pressure chronic ulcer of other part of left foot with fat layer exposed 10/23/2018    PVD (peripheral vascular disease)     Type 2 diabetes mellitus  with left diabetic foot ulcer 10/29/2018    Unsteady gait     uses a wheelchair       Past Surgical History  Past Surgical History:   Procedure Laterality Date    AHMED GLAUCOMA IMPLANT Left 2011    DONE AT [facility]    AORTOGRAPHY WITH SERIALOGRAPHY Left 4/30/2019    Procedure: AORTOGRAM, WITH SERIALOGRAPHY, LEFT LOWER EXTREMITY, POSSIBLE INTERVENTION;  Surgeon: Mitch Chan MD;  Location: [facility] OR;  Service: Vascular;  Laterality: Left;  RN PRE OP 4-23-19.  NO H & P, No Cosent  [place]    AORTOGRAPHY WITH SERIALOGRAPHY Right 10/6/2020    Procedure: AORTOGRAM, WITH SERIALOGRAPHY, RIGHT LOWER EXTREMITY ANGIOGRAM, POSSIBLE INTERVENTION;  Surgeon: Mitch Chan MD;  Location: [facility] OR;  Service: Vascular;  Laterality: Right;  RN PREOP 10/1/2020--COVID NEGATIVE ON 10/5    BAERVELDT GLAUCOMA IMPLANT Left 2012    WITH CATARACT EXTRACTION//DONE AT [facility]    CARDIAC CATHETERIZATION Left 05/2016    CARDIAC DEFIBRILLATOR PLACEMENT Left 11/02/2018    CATARACT EXTRACTION W/  INTRAOCULAR LENS IMPLANT Left 2012    WITH BAERVEDT//DONE AT [facility]    CATARACT EXTRACTION W/  INTRAOCULAR LENS IMPLANT Right 09/26/2018    COMPLEX ()    CB DESTRUCTION WITH CYCLO G6 Left 02/15/2017        CYST REMOVAL      DEBRIDEMENT OF FOOT  12/28/2018    Procedure: DEBRIDEMENT, FOOT;  Surgeon: Mitch Chan MD;  Location: [facility] OR;  Service: Vascular;;    DEBRIDEMENT OF FOOT  6/5/2019    Procedure: DEBRIDEMENT, FOOT;  Surgeon: Mitch Chan MD;  Location: [facility] OR;  Service: Vascular;;    EXAMINATION UNDER ANESTHESIA Left 12/5/2018    Procedure: Exam under anesthesia, left foot debridement, washout and all other indicated procedures;  Surgeon: Mitch Chan MD;  Location: [facility] OR;  Service: Vascular;  Laterality: Left;  1030AM START  RN PREOP 12/3/2018-----AICD  SEEN BY DR JAMES 12/4    EXAMINATION UNDER ANESTHESIA Left 2/13/2019    Procedure: LEFT FOOT WOUND DEBRIDEMENT, WASHOUT AND  ALL OTHER INDICATED PROCEDURES;  Surgeon: Mitch Wall MD;  Location: [facility] OR;  Service: Vascular;  Laterality: Left;  requesting 1st case start  THIS CASE 1ST PER DR WALL ON 2/1/19 @ 844AM -[place]  RN PREOP 2/6/2019  HAS CARDIAC CLEARANCE    EXAMINATION UNDER ANESTHESIA Left 12/28/2018    Procedure: Exam under anesthesia, left foot debridement, left foot washout, possible left second through fifth metatarsal resection;  Surgeon: Mitch Wall MD;  Location: [facility] OR;  Service: Vascular;  Laterality: Left;  1:00pm start per julissa @ 8:58am  RN  PHONE PRE OP 12-27-18--=Pt coming from [facility] on [address]  NEED CONSENT    EXAMINATION UNDER ANESTHESIA Left 6/5/2019    Procedure: LEFT FOOT DEBRIDEMENT, WASHOUT AND ALL OTHER INDICATED PROCEDURES;  Surgeon: Mitch Wall MD;  Location: [facility] OR;  Service: Vascular;  Laterality: Left;  RN PRE OP 5-31-19------ICD------NEED CONSENT    EXAMINATION UNDER ANESTHESIA Right 11/9/2020    Procedure: RIGHT FOOT DEBRIDEMENT, WASHOUT AND ALL OTHER INDICATED PROCEDURES;  Surgeon: Mitch Wall MD;  Location: [facility] OR;  Service: Vascular;  Laterality: Right;  RN PREOP 10/27/2020, --COVID NEGATIVE ON 11/6, has cardiac clearance/[place] 10/27/2020, pt has ICD  NEEDS ORDERS    INCISION AND DRAINAGE Left 11/14/2018    Procedure: Incision and Drainage, left lower extremity debridement, washout;  Surgeon: Mitch Wall MD;  Location: [facility] OR;  Service: Vascular;  Laterality: Left;    INCISION AND DRAINAGE Left 11/16/2018    Procedure: INCISION AND DRAINAGE;  Surgeon: Mitch Wall MD;  Location: [facility] OR;  Service: Vascular;  Laterality: Left;    INCISION AND DRAINAGE Right 11/18/2020    Procedure: Debridement and washout right lower extremity wounds;  Surgeon: Mitch Wall MD;  [facility];  Service: Vascular;  Laterality: Right;    INTRAOCULAR PROSTHESES INSERTION Right 9/26/2018    Procedure: INSERTION, IOL PROSTHESIS;   Surgeon: Perla Cortés MD;  [facility];  Service: Ophthalmology;  Laterality: Right;    PERITONEOCENTESIS N/A 3/1/2019    Procedure: PARACENTESIS, ABDOMINAL, INITIAL;  Surgeon: Dosc Diagnostic Provider;  [facility] OR;  Service: Radiology;  Laterality: N/A;    PERITONEOCENTESIS N/A 3/25/2019    Procedure: PARACENTESIS, ABDOMINAL, INITIAL;  Surgeon: Dosc Diagnostic Provider;  Location: [facility] OR;  Service: Radiology;  Laterality: N/A;    PERITONEOCENTESIS N/A 7/22/2019    Procedure: PARACENTESIS, ABDOMINAL, INITIAL;  Surgeon: Dosc Diagnostic Provider;  Location: [facility] OR;  Service: Radiology;  Laterality: N/A;    PERITONEOCENTESIS N/A 8/16/2019    Procedure: PARACENTESIS, ABDOMINAL, INITIAL;  Surgeon: Dosc Diagnostic Provider;  Location: [facility] OR;  Service: Radiology;  Laterality: N/A;    PERITONEOCENTESIS N/A 9/26/2019    Procedure: PARACENTESIS, ABDOMINAL;  Surgeon: Dosc Diagnostic Provider;  Location: [facility] OR;  Service: Radiology;  Laterality: N/A;    PERITONEOCENTESIS N/A 10/11/2019    Procedure: PARACENTESIS, ABDOMINAL;  Surgeon: Dosc Diagnostic Provider;  [facility];  Service: Radiology;  Laterality: N/A;    PERITONEOCENTESIS N/A 10/31/2019    Procedure: PARACENTESIS, ABDOMINAL;  Surgeon: Dosc Diagnostic Provider;  [facility];  Service: Radiology;  Laterality: N/A;    PERITONEOCENTESIS N/A 11/18/2019    Procedure: PARACENTESIS, ABDOMINAL;  Surgeon: Dosc Diagnostic Provider;  [facility];  Service: Radiology;  Laterality: N/A;    PERITONEOCENTESIS N/A 12/16/2019    Procedure: PARACENTESIS, ABDOMINAL;  Surgeon: Dosc Diagnostic Provider;  [facility];  Service: Radiology;  Laterality: N/A;  2PM START    PERITONEOCENTESIS N/A 2/7/2020    Procedure: PARACENTESIS, ABDOMINAL;  Surgeon: Dosc Diagnostic Provider;  Location: [facility] OR;  Service: Radiology;  Laterality: N/A;  REQUESTED 10:30A START    PERITONEOCENTESIS N/A 2/19/2020    Procedure: PARACENTESIS,  ABDOMINAL;  Surgeon: Dosc Diagnostic Provider;  Location: [facility] OR;  Service: Radiology;  Laterality: N/A;    PERITONEOCENTESIS N/A 2/28/2020    Procedure: PARACENTESIS, ABDOMINAL;  Surgeon: Dosc Diagnostic Provider;  Location: [facility] OR;  Service: Radiology;  Laterality: N/A;  REQUESTED 10AM START    PHACOEMULSIFICATION OF CATARACT Right 9/26/2018    Procedure: PHACOEMULSIFICATION, CATARACT;  Surgeon: Perla Cortés MD;  Location[facility] OR [address] [place] [address];  Service: Ophthalmology;  Laterality: Right;    REPEAT ABDOMINAL PARACENTESIS N/A 2/11/2019    Procedure: PARACENTESIS, ABDOMINAL, REPEAT;  Surgeon: Dosc Diagnostic Provider;  Location: [facility] OR;  Service: Radiology;  Laterality: N/A;  1PM START    REPEAT ABDOMINAL PARACENTESIS N/A 9/12/2019    Procedure: PARACENTESIS, ABDOMINAL, REPEAT;  Surgeon: Dosc Diagnostic Provider;  Location: [facility] OR;  Service: Radiology;  Laterality: N/A;  9AM START    REPEAT ABDOMINAL PARACENTESIS N/A 12/2/2019    Procedure: PARACENTESIS, ABDOMINAL, REPEAT;  Surgeon: Dosc Diagnostic Provider;  Location: [facility] OR;  Service: Radiology;  Laterality: N/A;  3PM START    REPEAT ABDOMINAL PARACENTESIS N/A 1/10/2020    Procedure: PARACENTESIS, ABDOMINAL, REPEAT;  Surgeon: Dosc Diagnostic Provider;  Location: [facility] OR;  Service: Radiology;  Laterality: N/A;  1130AM START    REPEAT ABDOMINAL PARACENTESIS N/A 1/20/2020    Procedure: PARACENTESIS, ABDOMINAL, REPEAT;  Surgeon: Dosc Diagnostic Provider;  Location: [facility] OR;  Service: Radiology;  Laterality: N/A;  1PM START    REPEAT ABDOMINAL PARACENTESIS N/A 1/31/2020    Procedure: PARACENTESIS, ABDOMINAL, REPEAT;  Surgeon: Dosc Diagnostic Provider;  Location: [facility] OR;  Service: Radiology;  Laterality: N/A;  10AM START    REPEAT ABDOMINAL PARACENTESIS N/A 3/16/2020    Procedure: PARACENTESIS, ABDOMINAL, REPEAT;  Surgeon: Dosc Diagnostic Provider;  Location: [facility] OR;  Service: Radiology;  Laterality: N/A;  10AM START    REPEAT ABDOMINAL PARACENTESIS  N/A 3/30/2020    Procedure: PARACENTESIS, ABDOMINAL, REPEAT;  Surgeon: Dosc Diagnostic Provider;  Location: [facility] OR;  Service: Radiology;  Laterality: N/A;    REPEAT ABDOMINAL PARACENTESIS N/A 4/9/2020    Procedure: PARACENTESIS, ABDOMINAL, REPEAT;  Surgeon: Dosc Diagnostic Provider;  Location: [facility] OR;  Service: Radiology;  Laterality: N/A;  1130AM START    REPEAT ABDOMINAL PARACENTESIS N/A 5/8/2020    Procedure: PARACENTESIS, ABDOMINAL, REPEAT;  Surgeon: Dosc Diagnostic Provider;  [facility];  Service: Radiology;  Laterality: N/A;  9AM START    REPEAT ABDOMINAL PARACENTESIS N/A 5/21/2020    Procedure: PARACENTESIS, ABDOMINAL, REPEAT;  Surgeon: Dosc Diagnostic Provider;  Location: [facility] OR;  Service: Radiology;  Laterality: N/A;  10AM START    REPEAT ABDOMINAL PARACENTESIS N/A 6/5/2020    Procedure: PARACENTESIS, ABDOMINAL, REPEAT;  Surgeon: Dosc Diagnostic Provider;  Location: [facility] OR;  Service: Radiology;  Laterality: N/A;  NOON START    REPEAT ABDOMINAL PARACENTESIS N/A 7/10/2020    Procedure: PARACENTESIS, ABDOMINAL, REPEAT;  Surgeon: Dosc Diagnostic Provider;  Location: [facility] OR;  Service: Radiology;  Laterality: N/A;  1PM START    REPEAT ABDOMINAL PARACENTESIS N/A 8/20/2020    Procedure: PARACENTESIS, ABDOMINAL, REPEAT;  Surgeon: Dosc Diagnostic Provider;  Location: [facility] OR;  Service: Radiology;  Laterality: N/A;  1PM START    REPEAT ABDOMINAL PARACENTESIS N/A 9/4/2020    Procedure: PARACENTESIS, ABDOMINAL, REPEAT;  Surgeon: Dosc Diagnostic Provider;  Location: [facility] OR;  Service: Radiology;  Laterality: N/A;  11 AM START    REPEAT ABDOMINAL PARACENTESIS N/A 9/16/2020    Procedure: PARACENTESIS, ABDOMINAL, REPEAT;  Surgeon: Dosc Diagnostic Provider;  [facility];  Service: Radiology;  Laterality: N/A;  START 2:00 P.M.    REPEAT ABDOMINAL PARACENTESIS N/A 9/28/2020    Procedure: PARACENTESIS, ABDOMINAL, REPEAT;  Surgeon: Dosc Diagnostic Provider;  Location: [facility] OR;  Service: Radiology;   Laterality: N/A;  1 P.M. START    REPEAT ABDOMINAL PARACENTESIS N/A 11/3/2020    Procedure: PARACENTESIS, ABDOMINAL, REPEAT;  Surgeon: [facility] Diagnostic Provider;  Location: [facility] OR;  Service: Radiology;  Laterality: N/A;  11AM START    REPEAT ABDOMINAL PARACENTESIS N/A 11/13/2020    Procedure: PARACENTESIS, ABDOMINAL, REPEAT;  Surgeon: [facility] Diagnostic Provider;  Location: [facility] OR;  Service: Radiology;  Laterality: N/A;  12PM START    REPEAT ABDOMINAL PARACENTESIS N/A 11/23/2020    Procedure: PARACENTESIS, ABDOMINAL, REPEAT;  Surgeon: [facility] Diagnostic Provider;  Location: [facility] OR;  Service: Radiology;  Laterality: N/A;    REPEAT ABDOMINAL PARACENTESIS N/A 12/1/2020    Procedure: PARACENTESIS, ABDOMINAL, REPEAT;  Surgeon: [facility] Diagnostic Provider;  Location: [facility] OR;  Service: Radiology;  Laterality: N/A;  11AM START    REPEAT ABDOMINAL PARACENTESIS N/A 12/8/2020    Procedure: PARACENTESIS, ABDOMINAL, REPEAT;  Surgeon: [facility] Diagnostic Provider;  Location: [facility] OR;  Service: Radiology;  Laterality: N/A;  93AM START    REVISION OF PROCEDURE INVOLVING GLAUCOMA DRAINAGE DEVICE Left 10/2/2019    EXTRUDING BAERVELDT /WITH PERICARDIAL PATCH GRAFT ()    Right foot surgery  10/2014    TOE AMPUTATION Right     first and second    TOE AMPUTATION Left 11/16/2018    Procedure: AMPUTATION, TOES  2-5;  Surgeon: Mitch Chan MD;  Location: [facility] OR;  Service: Vascular;  Laterality: Left;    TOE AMPUTATION Left 12/5/2018    Procedure: AMPUTATION, TOE;  Surgeon: Mitch Chan MD;  Location: [facility] OR;  Service: Vascular;  Laterality: Left;  left great toe    TONSILLECTOMY         Family History  Family History   Problem Relation Age of Onset    Diabetes Mother     Heart disease Brother     No Known Problems Father     No Known Problems Sister     No Known Problems Maternal Aunt     No Known Problems Maternal Uncle     No Known Problems Paternal Aunt     No Known Problems Paternal Uncle     No  Known Problems Maternal Grandmother     No Known Problems Maternal Grandfather     No Known Problems Paternal Grandmother     No Known Problems Paternal Grandfather     Anemia Neg Hx     Arrhythmia Neg Hx     Asthma Neg Hx     Clotting disorder Neg Hx     Fainting Neg Hx     Heart attack Neg Hx     Heart failure Neg Hx     Hyperlipidemia Neg Hx     Hypertension Neg Hx     Stroke Neg Hx     Atrial Septal Defect Neg Hx     Amblyopia Neg Hx     Blindness Neg Hx     Glaucoma Neg Hx     Macular degeneration Neg Hx     Retinal detachment Neg Hx     Strabismus Neg Hx        Social History  Social History     Socioeconomic History    Marital status: Single     Spouse name: Not on file    Number of children: Not on file    Years of education: 14    Highest education level: Not on file   Occupational History    Not on file   Social Needs    Financial resource strain: Not on file    Food insecurity     Worry: Not on file     Inability: Not on file    Transportation needs     Medical: Not on file     Non-medical: Not on file   Tobacco Use    Smoking status: Former Smoker     Packs/day: 1.00     Years: 3.00     Pack years: 3.00     Types: Cigarettes     Quit date: 1984     Years since quittin.4    Smokeless tobacco: Never Used   Substance and Sexual Activity    Alcohol use: Not Currently     Alcohol/week: 1.0 standard drinks     Types: 1 Cans of beer per week     Comment: rarely    Drug use: No    Sexual activity: Not Currently     Partners: Female   Lifestyle    Physical activity     Days per week: Not on file     Minutes per session: Not on file    Stress: Not on file   Relationships    Social connections     Talks on phone: Not on file     Gets together: Not on file     Attends [employer] service: Not on file     Active member of club or organization: Not on file     Attends meetings of clubs or organizations: Not on file     Relationship status: Not on file   Other Topics Concern  "   Not on file   Social History Narrative    Not on file       Current Medications  Current Outpatient Medications on File Prior to Visit   Medication Sig Dispense Refill    allopurinoL (ZYLOPRIM) 300 MG tablet Take 1 tablet (300 mg total) by mouth once daily. 30 tablet 3    aspirin (ECOTRIN) 81 MG EC tablet Take 81 mg by mouth once daily.      bisacodyl (DULCOLAX) 5 mg EC tablet Take 5 mg by mouth daily as needed for Constipation.      brimonidine 0.2% (ALPHAGAN) 0.2 % Drop Place 1 drop into both eyes 3 (three) times daily. 10 mL 11    carvediloL (COREG) 3.125 MG tablet TAKE 1 TABLET(3.125 MG) BY MOUTH TWICE DAILY 180 tablet 1    coenzyme Q10 100 mg capsule Take 100 mg by mouth every morning.      collagenase (SANTYL) ointment Apply topically once daily. 90 g 0    dorzolamide-timolol 2-0.5% (COSOPT) 22.3-6.8 mg/mL ophthalmic solution Place 1 drop into both eyes 3 (three) times daily. 1 Bottle 11    ezetimibe (ZETIA) 10 mg tablet Take 1 tablet (10 mg total) by mouth once daily. 90 tablet 1    fish oil-omega-3 fatty acids 300-1,000 mg capsule Take 1 capsule by mouth 2 (two) times daily. 60 capsule 3    HYDROcodone-acetaminophen (NORCO) 7.5-325 mg per tablet Take 1 tablet by mouth every 8 (eight) hours as needed for Pain. 30 tablet 0    insulin degludec-liraglutide (XULTOPHY 100/3.6) 100 unit-3.6 mg /mL (3 mL) InPn Inject 35 Units into the skin once daily. 15 mL 5    loratadine (CLARITIN) 10 mg tablet Take 10 mg by mouth daily as needed for Allergies.      MULTIVIT,THER IRON,CA,FA & MIN (MULTIVITAMIN) Tab Take 1 tablet by mouth every morning.       pen needle, diabetic (BD ULTRA-FINE AMADOR PEN NEEDLE) 32 gauge x 5/32" Ndle 1 Device by Misc.(Non-Drug; Combo Route) route 2 (two) times a day. 100 each 11    torsemide (DEMADEX) 20 MG Tab Take 4 tablets (80 mg total) by mouth 2 (two) times daily. 240 tablet 2    [DISCONTINUED] gabapentin (NEURONTIN) 100 MG capsule TAKE 2 CAPSULES(200 MG) BY MOUTH EVERY " "EVENING 60 capsule 1    oxyCODONE (ROXICODONE) 5 MG immediate release tablet Take 1 tablet (5 mg total) by mouth every 6 (six) hours as needed for Pain. (Patient not taking: Reported on 12/15/2020) 16 tablet 0    oxyCODONE (ROXICODONE) 5 MG immediate release tablet Take 1 tablet (5 mg total) by mouth every 6 (six) hours as needed for Pain. (Patient not taking: Reported on 12/15/2020) 20 tablet 0     Current Facility-Administered Medications on File Prior to Visit   Medication Dose Route Frequency Provider Last Rate Last Dose    clindamycin 900 MG/50 ML D5W 900 mg/50 mL IVPB 900 mg  900 mg Intravenous Q8H Mitch Chan MD   900 mg at 11/18/20 1312    lactated ringers infusion   Intravenous Continuous Tam Reynolds MD        lidocaine (PF) 10 mg/ml (1%) injection 10 mg  1 mL Intradermal Once Tam Reynolds MD           Allergies   Review of patient's allergies indicates:   Allergen Reactions    Statins-hmg-coa reductase inhibitors      Generalized Pain    Onglyza [saxagliptin]     Penicillins Rash       Review of Systems (Pertinent positives)  Review of Systems   Constitutional: Negative for chills, diaphoresis, fever, malaise/fatigue and weight loss.   HENT: Negative.    Eyes: Negative.    Respiratory: Negative for cough, hemoptysis, sputum production, shortness of breath and wheezing.    Cardiovascular: Negative for chest pain, palpitations, orthopnea, claudication, leg swelling and PND.   Gastrointestinal: Negative for abdominal pain, diarrhea, heartburn, nausea and vomiting.   Genitourinary: Negative.    Musculoskeletal: Negative.    Skin: Negative for itching and rash.   Neurological: Negative for dizziness, tingling, tremors, sensory change and headaches.   Endo/Heme/Allergies: Negative.    Psychiatric/Behavioral: Negative.        /74 (BP Location: Left arm, Patient Position: Sitting, BP Method: Small (Automatic))   Pulse 74   Temp 98 °F (36.7 °C) (Oral)   Resp 16   Ht 5' 10" (1.778 " m)   Wt 93 kg (205 lb)   SpO2 97%   BMI 29.41 kg/m²     Physical Exam  Vitals signs reviewed.   Constitutional:       General: He is not in acute distress.     Appearance: Normal appearance. He is not ill-appearing, toxic-appearing or diaphoretic.      Comments: Patient resting comfortably in examination room in no acute distress, conversing appropriately   HENT:      Head: Normocephalic and atraumatic.   Eyes:      Extraocular Movements: Extraocular movements intact.      Conjunctiva/sclera: Conjunctivae normal.      Pupils: Pupils are equal, round, and reactive to light.   Neck:      Musculoskeletal: Normal range of motion and neck supple.   Cardiovascular:      Rate and Rhythm: Normal rate and regular rhythm.      Pulses: Normal pulses.      Heart sounds: Normal heart sounds. No murmur. No friction rub. No gallop.    Pulmonary:      Effort: Pulmonary effort is normal. No respiratory distress.      Breath sounds: Normal breath sounds. No stridor. No wheezing, rhonchi or rales.   Chest:      Chest wall: No tenderness.   Abdominal:      General: Bowel sounds are normal. There is no distension.      Palpations: Abdomen is soft. There is no mass.      Tenderness: There is no abdominal tenderness.   Skin:     General: Skin is warm and dry.      Capillary Refill: Capillary refill takes less than 2 seconds.   Neurological:      General: No focal deficit present.      Mental Status: He is alert and oriented to person, place, and time.          Assessment/Plan:  Marvin Ray is a 60 y.o. male who presents today for :    Marvin was seen today for pre-op exam.    Diagnoses and all orders for this visit:    Preop examination    CKD stage 3 due to type 2 diabetes mellitus    Essential hypertension    PVD (peripheral vascular disease)    Coronary artery disease involving native coronary artery of native heart without angina pectoris    Ischemic cardiomyopathy     Revised cardiac risk index score is 3, conferring class  four risk.  He has received clearance from cardiology already.  Recent A1c shows well-controlled diabetes.  Educated patient regarding the effects of hyperglycemia on wound healing and the importance of controlling blood sugars after the surgery.  CKD stable from recent lab work in the last 2 months.  BP and other VS WNL today.  No need for further labs or imaging today.  Patient is high risk for low to medium risk procedure.  Patient states he will hold his insulin the morning of the procedure when he does not eat.  He will take his insulin when he has a meal after the procedure.  He states he was told to stop his vitamins and fish oil prior to the procedure which he plans on doing today.  All questions answered.  Patient verbalized understanding of instructions.        This office note has been dictated.  This dictation has been generated using M-Modal Fluency Direct dictation; some phonetic errors may occur.   My collaborating physician is Dr. Rancho Whiting.

## 2020-12-16 ENCOUNTER — HOSPITAL ENCOUNTER (OUTPATIENT)
Dept: PREADMISSION TESTING | Facility: HOSPITAL | Age: 60
Discharge: HOME | End: 2020-12-16
Attending: PODIATRIST
Payer: MEDICAID

## 2020-12-16 ENCOUNTER — ANESTHESIA EVENT (OUTPATIENT)
Dept: SURGERY | Facility: HOSPITAL | Age: 60
End: 2020-12-16
Payer: MEDICAID

## 2020-12-16 ENCOUNTER — TELEPHONE (OUTPATIENT)
Dept: WOUND CARE | Facility: HOSPITAL | Age: 60
End: 2020-12-16

## 2020-12-16 VITALS
HEIGHT: 70 IN | SYSTOLIC BLOOD PRESSURE: 109 MMHG | WEIGHT: 208.56 LBS | BODY MASS INDEX: 29.86 KG/M2 | DIASTOLIC BLOOD PRESSURE: 71 MMHG | TEMPERATURE: 98 F | HEART RATE: 71 BPM | OXYGEN SATURATION: 98 % | RESPIRATION RATE: 17 BRPM

## 2020-12-16 DIAGNOSIS — Z01.818 PRE-OP TESTING: Primary | ICD-10-CM

## 2020-12-16 LAB
ALBUMIN SERPL BCP-MCNC: 2.6 G/DL (ref 3.5–5.2)
ALP SERPL-CCNC: 108 U/L (ref 55–135)
ALT SERPL W/O P-5'-P-CCNC: 9 U/L (ref 10–44)
ANION GAP SERPL CALC-SCNC: 7 MMOL/L (ref 8–16)
AST SERPL-CCNC: 13 U/L (ref 10–40)
BASOPHILS # BLD AUTO: 0.07 K/UL (ref 0–0.2)
BASOPHILS NFR BLD: 1.3 % (ref 0–1.9)
BILIRUB SERPL-MCNC: 0.5 MG/DL (ref 0.1–1)
BUN SERPL-MCNC: 30 MG/DL (ref 8–23)
CALCIUM SERPL-MCNC: 8.4 MG/DL (ref 8.7–10.5)
CHLORIDE SERPL-SCNC: 105 MMOL/L (ref 95–110)
CO2 SERPL-SCNC: 28 MMOL/L (ref 23–29)
CREAT SERPL-MCNC: 1.1 MG/DL (ref 0.5–1.4)
DIFFERENTIAL METHOD: ABNORMAL
EOSINOPHIL # BLD AUTO: 0.4 K/UL (ref 0–0.5)
EOSINOPHIL NFR BLD: 8.3 % (ref 0–8)
ERYTHROCYTE [DISTWIDTH] IN BLOOD BY AUTOMATED COUNT: 21.3 % (ref 11.5–14.5)
EST. GFR  (AFRICAN AMERICAN): >60 ML/MIN/1.73 M^2
EST. GFR  (NON AFRICAN AMERICAN): >60 ML/MIN/1.73 M^2
GLUCOSE SERPL-MCNC: 150 MG/DL (ref 70–110)
HCT VFR BLD AUTO: 38.7 % (ref 40–54)
HGB BLD-MCNC: 12.1 G/DL (ref 14–18)
IMM GRANULOCYTES # BLD AUTO: 0.01 K/UL (ref 0–0.04)
IMM GRANULOCYTES NFR BLD AUTO: 0.2 % (ref 0–0.5)
LYMPHOCYTES # BLD AUTO: 0.5 K/UL (ref 1–4.8)
LYMPHOCYTES NFR BLD: 9 % (ref 18–48)
MCH RBC QN AUTO: 27.9 PG (ref 27–31)
MCHC RBC AUTO-ENTMCNC: 31.3 G/DL (ref 32–36)
MCV RBC AUTO: 89 FL (ref 82–98)
MONOCYTES # BLD AUTO: 0.5 K/UL (ref 0.3–1)
MONOCYTES NFR BLD: 10.1 % (ref 4–15)
NEUTROPHILS # BLD AUTO: 3.8 K/UL (ref 1.8–7.7)
NEUTROPHILS NFR BLD: 71.1 % (ref 38–73)
NRBC BLD-RTO: 0 /100 WBC
PLATELET # BLD AUTO: 231 K/UL (ref 150–350)
PMV BLD AUTO: 9.1 FL (ref 9.2–12.9)
POTASSIUM SERPL-SCNC: 3.9 MMOL/L (ref 3.5–5.1)
PROT SERPL-MCNC: 6.3 G/DL (ref 6–8.4)
RBC # BLD AUTO: 4.33 M/UL (ref 4.6–6.2)
SARS-COV-2 RDRP RESP QL NAA+PROBE: NEGATIVE
SODIUM SERPL-SCNC: 140 MMOL/L (ref 136–145)
WBC # BLD AUTO: 5.33 K/UL (ref 3.9–12.7)

## 2020-12-16 PROCEDURE — 85025 COMPLETE CBC W/AUTO DIFF WBC: CPT

## 2020-12-16 PROCEDURE — 36415 COLL VENOUS BLD VENIPUNCTURE: CPT

## 2020-12-16 PROCEDURE — 80053 COMPREHEN METABOLIC PANEL: CPT

## 2020-12-16 PROCEDURE — U0002 COVID-19 LAB TEST NON-CDC: HCPCS

## 2020-12-16 NOTE — DISCHARGE INSTRUCTIONS
Your procedure  is scheduled for _Friday  DEC  18,  2020_________.    Call [phone] between 2pm and 5pm on _Thursday  DEC 17,  2020______to find out your arrival time for the day of surgery.    If your arrival time is earlier than 7:00 am, enter through the Emergency Department on the day of your surgery.    If your arrival time is after 7:00 am, enter at the front entrance of the hospital.    You will be going to the Same Day Surgery Unit on the 2nd floor of the hospital.    Important instructions:   Do not eat or drink after 12 midnight, including water.  It is okay to brush your teeth.                                                                                                                                                                                                  Do not have gum, candy or mints.    SEE MEDICATION SHEET       TAKE MEDICATIONS AS DIRECTED WITH A SIP OF WATER THE AM OF SURGERY      Do  NOT   take any diabetic medication on the morning of surgery unless instructed to do so by your doctor or pre op nurse.    STOP taking Aspirin, Ibuprofen,  Advil, Motrin, Mobic(meloxicam), Aleve (naproxen), Fish oil, and Vitamin E for at least 7 days before your surgery.     You may take Tylenol If needed which is not a blood thinner.           Prep instructions:  ENEMA   SHOWER   OTHER_____________     Please shower the night before    OR      the morning of your surgery.     Use Hibiclens soap as instructed by your pre op nurse.   Please place clean linens on your bed the night before surgery. Please wear fresh clean clothing after each shower.     You may wear deodorant only.      Do not wear powder, body lotion or perfume/cologne.     Do not wear any jewelry or have any metal on your body.     If you are going home on the same day of surgery, you must arrange for a family member or a friend to drive you home.  Public transportation is prohibited.  You will not be able to drive home if you were  given anesthesia or sedation.     Wear loose fitting clothes allowing for bandages.     Please leave money and valuables home.       You may bring your cell phone.     Call the doctor if fever or illness should occur before your surgery.    Call [phone] to contact us here if needed.

## 2020-12-16 NOTE — TELEPHONE ENCOUNTER
Updated sister regarding medications for wound care (santyl ordered, antibiotic ointment on its way). Stated she is in [facility] now for santyl and they don't have the large tube and they are trying to figure out how to dispense med.

## 2020-12-16 NOTE — TELEPHONE ENCOUNTER
Spoke with Pharmacist regarding patient running out of ointment. New dose to be mailed to patient today. Spoke with MD Page yesterday about reordering santyl as well.

## 2020-12-16 NOTE — ANESTHESIA PREPROCEDURE EVALUATION
12/16/2020  Marvin Ray is a 60 y.o., male scheduled for  IRRIGATION AND DEBRIDEMENT (Right ) on 12/182020.    [employer] ICD    Model # [id number]  Serial # [id number]  Item # [id number]        Spoke with Rep-best practice is to place a magnet during procedure.          Past Medical History:   Diagnosis Date    Arthritis     Cellulitis     CKD (chronic kidney disease), stage III     Coronary artery disease     Diabetes mellitus     Diabetic retinopathy     Diabetic ulcer of left foot     Glaucoma     Gout     Hyperlipemia     Hypertension     ICD (implantable cardioverter-defibrillator) in place 11/02/2018    Left chest    Non-pressure chronic ulcer of other part of left foot with fat layer exposed 10/23/2018    PVD (peripheral vascular disease)     Type 2 diabetes mellitus with left diabetic foot ulcer 10/29/2018    Unsteady gait     uses a wheelchair       Past Surgical History:   Procedure Laterality Date    AHMED GLAUCOMA IMPLANT Left 2011    DONE AT [facility]    AORTOGRAPHY WITH SERIALOGRAPHY Left 4/30/2019    Procedure: AORTOGRAM, WITH SERIALOGRAPHY, LEFT LOWER EXTREMITY, POSSIBLE INTERVENTION;  Surgeon: Mitch Chan MD;  Location: [facility] OR;  Service: Vascular;  Laterality: Left;  RN PRE OP 4-23-19.  NO H & P, No Cosent  [place]    AORTOGRAPHY WITH SERIALOGRAPHY Right 10/6/2020    Procedure: AORTOGRAM, WITH SERIALOGRAPHY, RIGHT LOWER EXTREMITY ANGIOGRAM, POSSIBLE INTERVENTION;  Surgeon: Mitch Chan MD;  Location: [facility] OR;  Service: Vascular;  Laterality: Right;  RN PREOP 10/1/2020--COVID NEGATIVE ON 10/5    BAERVELDT GLAUCOMA IMPLANT Left 2012    WITH CATARACT EXTRACTION//DONE AT [facility]    CARDIAC CATHETERIZATION Left 05/2016    CARDIAC DEFIBRILLATOR PLACEMENT Left 11/02/2018    CATARACT EXTRACTION W/  INTRAOCULAR LENS IMPLANT Left 2012    WITH  BAMILLICENTT//DONE AT [facility]    CATARACT EXTRACTION W/  INTRAOCULAR LENS IMPLANT Right 09/26/2018    COMPLEX ()    CB DESTRUCTION WITH CYCLO G6 Left 02/15/2017        CYST REMOVAL      DEBRIDEMENT OF FOOT  12/28/2018    Procedure: DEBRIDEMENT, FOOT;  Surgeon: Mitch Wall MD;  Location: [facility] [place] OR;  Service: Vascular;;    DEBRIDEMENT OF FOOT  6/5/2019    Procedure: DEBRIDEMENT, FOOT;  Surgeon: Mitch Wall MD;  Location: [facility] [place] OR;  Service: Vascular;;    EXAMINATION UNDER ANESTHESIA Left 12/5/2018    Procedure: Exam under anesthesia, left foot debridement, washout and all other indicated procedures;  Surgeon: Mitch Wall MD;  Location: [facility] [place] OR;  Service: Vascular;  Laterality: Left;  1030AM START  RN PREOP 12/3/2018-----AICD  SEEN BY DR JAMES 12/4    EXAMINATION UNDER ANESTHESIA Left 2/13/2019    Procedure: LEFT FOOT WOUND DEBRIDEMENT, WASHOUT AND ALL OTHER INDICATED PROCEDURES;  Surgeon: Mitch Wall MD;  Location: [facility] [place] OR;  Service: Vascular;  Laterality: Left;  requesting 1st case start  THIS CASE 1ST PER DR WALL ON 2/1/19 @ 844AM -LO  RN PREOP 2/6/2019  HAS CARDIAC CLEARANCE    EXAMINATION UNDER ANESTHESIA Left 12/28/2018    Procedure: Exam under anesthesia, left foot debridement, left foot washout, possible left second through fifth metatarsal resection;  Surgeon: Mitch Wall MD;  Location: [facility] [place] OR;  Service: Vascular;  Laterality: Left;  1:00pm start per julissa @ 8:58am  RN  PHONE PRE OP 12-27-18--=Pt coming from [facility] on [address]  NEED CONSENT    EXAMINATION UNDER ANESTHESIA Left 6/5/2019    Procedure: LEFT FOOT DEBRIDEMENT, WASHOUT AND ALL OTHER INDICATED PROCEDURES;  Surgeon: Mitch Wall MD;  Location: [facility] [place] OR;  Service: Vascular;  Laterality: Left;  RN PRE OP 5-31-19------ICD------NEED CONSENT    EXAMINATION UNDER ANESTHESIA Right 11/9/2020    Procedure: RIGHT FOOT DEBRIDEMENT, WASHOUT AND ALL OTHER INDICATED PROCEDURES;   Surgeon: Mitch Chan MD;  Location: [facility] OR;  Service: Vascular;  Laterality: Right;  RN PREOP 10/27/2020, --COVID NEGATIVE ON 11/6, has cardiac clearance/[place] 10/27/2020, pt has ICD  NEEDS ORDERS    EYE SURGERY      CAT EXT OU    INCISION AND DRAINAGE Left 11/14/2018    Procedure: Incision and Drainage, left lower extremity debridement, washout;  Surgeon: Mitch Chan MD;  Location: [facility] OR;  Service: Vascular;  Laterality: Left;    INCISION AND DRAINAGE Left 11/16/2018    Procedure: INCISION AND DRAINAGE;  Surgeon: Mitch Chan MD;  Location: [facility] OR;  Service: Vascular;  Laterality: Left;    INCISION AND DRAINAGE Right 11/18/2020    Procedure: Debridement and washout right lower extremity wounds;  Surgeon: Mitch Chan MD;  Location: [facility] OR 2ND FLR;  Service: Vascular;  Laterality: Right;    INTRAOCULAR PROSTHESES INSERTION Right 9/26/2018    Procedure: INSERTION, IOL PROSTHESIS;  Surgeon: Perla Cortés MD;  Location: [facility] OR 1ST FLR;  Service: Ophthalmology;  Laterality: Right;    PERITONEOCENTESIS N/A 3/1/2019    Procedure: PARACENTESIS, ABDOMINAL, INITIAL;  Surgeon: Dosc Diagnostic Provider;  Location: [facility] OR;  Service: Radiology;  Laterality: N/A;    PERITONEOCENTESIS N/A 3/25/2019    Procedure: PARACENTESIS, ABDOMINAL, INITIAL;  Surgeon: Dosc Diagnostic Provider;  Location: [facility] OR;  Service: Radiology;  Laterality: N/A;    PERITONEOCENTESIS N/A 7/22/2019    Procedure: PARACENTESIS, ABDOMINAL, INITIAL;  Surgeon: Dosc Diagnostic Provider;  Location: [facility] OR;  Service: Radiology;  Laterality: N/A;    PERITONEOCENTESIS N/A 8/16/2019    Procedure: PARACENTESIS, ABDOMINAL, INITIAL;  Surgeon: Dosc Diagnostic Provider;  Location: [facility] OR;  Service: Radiology;  Laterality: N/A;    PERITONEOCENTESIS N/A 9/26/2019    Procedure: PARACENTESIS, ABDOMINAL;  Surgeon: Dosc Diagnostic Provider;  Location: [facility] OR;  Service: Radiology;  Laterality: N/A;     PERITONEOCENTESIS N/A 10/11/2019    Procedure: PARACENTESIS, ABDOMINAL;  Surgeon: Dosc Diagnostic Provider;  Location: [facility] OR;  Service: Radiology;  Laterality: N/A;    PERITONEOCENTESIS N/A 10/31/2019    Procedure: PARACENTESIS, ABDOMINAL;  Surgeon: Dosc Diagnostic Provider;  Location: [facility] OR;  Service: Radiology;  Laterality: N/A;    PERITONEOCENTESIS N/A 11/18/2019    Procedure: PARACENTESIS, ABDOMINAL;  Surgeon: Dosc Diagnostic Provider;  Location: [facility] OR;  Service: Radiology;  Laterality: N/A;    PERITONEOCENTESIS N/A 12/16/2019    Procedure: PARACENTESIS, ABDOMINAL;  Surgeon: Dosc Diagnostic Provider;  Location: [facility] OR;  Service: Radiology;  Laterality: N/A;  2PM START    PERITONEOCENTESIS N/A 2/7/2020    Procedure: PARACENTESIS, ABDOMINAL;  Surgeon: Dosc Diagnostic Provider;  Location: [facility] OR;  Service: Radiology;  Laterality: N/A;  REQUESTED 10:30A START    PERITONEOCENTESIS N/A 2/19/2020    Procedure: PARACENTESIS, ABDOMINAL;  Surgeon: Dosc Diagnostic Provider;  Location: [facility] OR;  Service: Radiology;  Laterality: N/A;    PERITONEOCENTESIS N/A 2/28/2020    Procedure: PARACENTESIS, ABDOMINAL;  Surgeon: Dosc Diagnostic Provider;  Location: [facility] OR;  Service: Radiology;  Laterality: N/A;  REQUESTED 10AM START    PHACOEMULSIFICATION OF CATARACT Right 9/26/2018    Procedure: PHACOEMULSIFICATION, CATARACT;  Surgeon: Perla Cortés MD;  Location: [address];  Service: Ophthalmology;  Laterality: Right;    REPEAT ABDOMINAL PARACENTESIS N/A 2/11/2019    Procedure: PARACENTESIS, ABDOMINAL, REPEAT;  Surgeon: Dosc Diagnostic Provider;  Location: [facility] OR;  Service: Radiology;  Laterality: N/A;  1PM START    REPEAT ABDOMINAL PARACENTESIS N/A 9/12/2019    Procedure: PARACENTESIS, ABDOMINAL, REPEAT;  Surgeon: Dosc Diagnostic Provider;  Location: [facility] OR;  Service: Radiology;  Laterality: N/A;  9AM START    REPEAT ABDOMINAL PARACENTESIS N/A 12/2/2019    Procedure: PARACENTESIS, ABDOMINAL, REPEAT;   Surgeon: Dosc Diagnostic Provider;  Location: [facility] OR;  Service: Radiology;  Laterality: N/A;  3PM START    REPEAT ABDOMINAL PARACENTESIS N/A 1/10/2020    Procedure: PARACENTESIS, ABDOMINAL, REPEAT;  Surgeon: Dosc Diagnostic Provider;  Location: [facility] OR;  Service: Radiology;  Laterality: N/A;  1130AM START    REPEAT ABDOMINAL PARACENTESIS N/A 1/20/2020    Procedure: PARACENTESIS, ABDOMINAL, REPEAT;  Surgeon: Dosc Diagnostic Provider;  Location: [facility] OR;  Service: Radiology;  Laterality: N/A;  1PM START    REPEAT ABDOMINAL PARACENTESIS N/A 1/31/2020    Procedure: PARACENTESIS, ABDOMINAL, REPEAT;  Surgeon: Dosc Diagnostic Provider;  Location: [facility] OR;  Service: Radiology;  Laterality: N/A;  10AM START    REPEAT ABDOMINAL PARACENTESIS N/A 3/16/2020    Procedure: PARACENTESIS, ABDOMINAL, REPEAT;  Surgeon: Dosc Diagnostic Provider;  Location: [facility] OR;  Service: Radiology;  Laterality: N/A;  10AM START    REPEAT ABDOMINAL PARACENTESIS N/A 3/30/2020    Procedure: PARACENTESIS, ABDOMINAL, REPEAT;  Surgeon: Dos Diagnostic Provider;  Location: [facility] OR;  Service: Radiology;  Laterality: N/A;    REPEAT ABDOMINAL PARACENTESIS N/A 4/9/2020    Procedure: PARACENTESIS, ABDOMINAL, REPEAT;  Surgeon: Dos Diagnostic Provider;  Location: [facility] OR;  Service: Radiology;  Laterality: N/A;  1130AM START    REPEAT ABDOMINAL PARACENTESIS N/A 5/8/2020    Procedure: PARACENTESIS, ABDOMINAL, REPEAT;  Surgeon: Dosc Diagnostic Provider;  Location: [facility] OR;  Service: Radiology;  Laterality: N/A;  9AM START    REPEAT ABDOMINAL PARACENTESIS N/A 5/21/2020    Procedure: PARACENTESIS, ABDOMINAL, REPEAT;  Surgeon: Dosc Diagnostic Provider;  Location: [facility] OR;  Service: Radiology;  Laterality: N/A;  10AM START    REPEAT ABDOMINAL PARACENTESIS N/A 6/5/2020    Procedure: PARACENTESIS, ABDOMINAL, REPEAT;  Surgeon: Dosc Diagnostic Provider;  Location: [facility] OR;  Service: Radiology;  Laterality: N/A;  NOON START    REPEAT ABDOMINAL PARACENTESIS N/A  7/10/2020    Procedure: PARACENTESIS, ABDOMINAL, REPEAT;  Surgeon: Dosc Diagnostic Provider;  Location: [facility] OR;  Service: Radiology;  Laterality: N/A;  1PM START    REPEAT ABDOMINAL PARACENTESIS N/A 8/20/2020    Procedure: PARACENTESIS, ABDOMINAL, REPEAT;  Surgeon: Dosc Diagnostic Provider;  Location: [facility] OR;  Service: Radiology;  Laterality: N/A;  1PM START    REPEAT ABDOMINAL PARACENTESIS N/A 9/4/2020    Procedure: PARACENTESIS, ABDOMINAL, REPEAT;  Surgeon: Dosc Diagnostic Provider;  Location: [facility] OR;  Service: Radiology;  Laterality: N/A;  11 AM START    REPEAT ABDOMINAL PARACENTESIS N/A 9/16/2020    Procedure: PARACENTESIS, ABDOMINAL, REPEAT;  Surgeon: Dosc Diagnostic Provider;  Location: [facility] OR;  Service: Radiology;  Laterality: N/A;  START 2:00 P.M.    REPEAT ABDOMINAL PARACENTESIS N/A 9/28/2020    Procedure: PARACENTESIS, ABDOMINAL, REPEAT;  Surgeon: Dosc Diagnostic Provider;  Location: [facility] OR;  Service: Radiology;  Laterality: N/A;  1 P.M. START    REPEAT ABDOMINAL PARACENTESIS N/A 11/3/2020    Procedure: PARACENTESIS, ABDOMINAL, REPEAT;  Surgeon: Dosc Diagnostic Provider;  Location: [facility] OR;  Service: Radiology;  Laterality: N/A;  11AM START    REPEAT ABDOMINAL PARACENTESIS N/A 11/13/2020    Procedure: PARACENTESIS, ABDOMINAL, REPEAT;  Surgeon: Dosc Diagnostic Provider;  Location: [facility] OR;  Service: Radiology;  Laterality: N/A;  12PM START    REPEAT ABDOMINAL PARACENTESIS N/A 11/23/2020    Procedure: PARACENTESIS, ABDOMINAL, REPEAT;  Surgeon: Dosc Diagnostic Provider;  Location: [facility] OR;  Service: Radiology;  Laterality: N/A;    REPEAT ABDOMINAL PARACENTESIS N/A 12/1/2020    Procedure: PARACENTESIS, ABDOMINAL, REPEAT;  Surgeon: Dosc Diagnostic Provider;  Location: [facility] OR;  Service: Radiology;  Laterality: N/A;  11AM START    REPEAT ABDOMINAL PARACENTESIS N/A 12/8/2020    Procedure: PARACENTESIS, ABDOMINAL, REPEAT;  Surgeon: Dosc Diagnostic Provider;  Location: [facility] OR;  Service: Radiology;   Laterality: N/A;  93AM START    REVISION OF PROCEDURE INVOLVING GLAUCOMA DRAINAGE DEVICE Left 10/2/2019    EXTRUDING BAERVELDT /WITH PERICARDIAL PATCH GRAFT ()    Right foot surgery  10/2014    TOE AMPUTATION Right     first and second    TOE AMPUTATION Left 11/16/2018    Procedure: AMPUTATION, TOES  2-5;  Surgeon: Mitch Chan MD;  Location: [facility] OR;  Service: Vascular;  Laterality: Left;    TOE AMPUTATION Left 12/5/2018    Procedure: AMPUTATION, TOE;  Surgeon: Mitch Chan MD;  Location: [facility] OR;  Service: Vascular;  Laterality: Left;  left great toe    TONSILLECTOMY       EKG 12/11/2020:  Normal sinus rhythm with occasional Premature ventricular complexes   Right superior axis deviation   Anteroseptal infarct (cited on or before 30-OCT-2018)   ST and T wave abnormality, consider inferolateral ischemia   Abnormal ECG     Anesthesia Evaluation    I have reviewed the Patient Summary Reports.    I have reviewed the Nursing Notes. I have reviewed the NPO Status.   I have reviewed the Medications.     Review of Systems  Anesthesia Hx:  No problems with previous Anesthesia  Denies Family Hx of Anesthesia complications.   Denies Personal Hx of Anesthesia complications.   Social:  Former Smoker    Hematology/Oncology:  Hematology Normal   Oncology Normal     EENT/Dental:EENT/Dental Normal   Cardiovascular:   Pacemaker Hypertension CAD   hyperlipidemia    Pulmonary:  Pulmonary Normal    Renal/:   Chronic Renal Disease    Hepatic/GI:   No Bowel Prep. Denies PUD. Denies Hiatal Hernia.  Denies GERD.  Denies Hepatitis.    Musculoskeletal:   Arthritis     Neurological:  Neurology Normal    Endocrine:   Diabetes, type 2 Denies Hypothyroidism. Denies Hyperthyroidism. Checks sugars daily, ranges 80s-140s   Dermatological:  Skin Normal    Psych:  Psychiatric Normal           Physical Exam  General:  Obesity    Airway/Jaw/Neck:  AIRWAY FINDINGS: Normal      Chest/Lungs:  Chest/Lungs Clear   "  Heart/Vascular:  Heart Findings: Normal       Mental Status:  Mental Status Findings: Normal        Anesthesia Plan  Type of Anesthesia, risks & benefits discussed:  Anesthesia Type:  MAC  Patient's Preference:   Intra-op Monitoring Plan: standard ASA monitors  Intra-op Monitoring Plan Comments:   Post Op Pain Control Plan:   Post Op Pain Control Plan Comments:   Induction:   IV  Beta Blocker:  Patient is not currently on a Beta-Blocker (No further documentation required).       Informed Consent: Patient understands risks and agrees with Anesthesia plan.  Questions answered. Anesthesia consent signed with patient.  ASA Score: 3     Day of Surgery Review of History & Physical:  There are no significant changes.  H&P update referred to the provider.     Anesthesia Plan Notes: He has a history of hypertension, hyperlipidemia, CAD, ischemic cardiomyopathy (ICD in place), type 2 diabetes, chronic kidney disease. Seen by Dr. Romano 12/11/2020 he states, " The patient is at moderate cardiac risk for the planned surgery and general anesthesia as deemed necessary.  No further preoperative cardiac testing is required at this juncture."  Seen by PCP on 12/15/2020 he states, " Patient is high risk for low to medium risk procedure."          Ready For Surgery From Anesthesia Perspective.       "

## 2020-12-17 ENCOUNTER — HOSPITAL ENCOUNTER (OUTPATIENT)
Dept: INTERVENTIONAL RADIOLOGY/VASCULAR | Facility: HOSPITAL | Age: 60
Discharge: HOME | End: 2020-12-17
Attending: FAMILY MEDICINE
Payer: MEDICAID

## 2020-12-17 ENCOUNTER — HOSPITAL ENCOUNTER (OUTPATIENT)
Facility: HOSPITAL | Age: 60
Discharge: HOME | End: 2020-12-17
Attending: RADIOLOGY | Admitting: RADIOLOGY
Payer: MEDICAID

## 2020-12-17 DIAGNOSIS — R18.8 OTHER ASCITES: ICD-10-CM

## 2020-12-17 PROCEDURE — 49083 ABD PARACENTESIS W/IMAGING: CPT | Mod: ,,, | Performed by: RADIOLOGY

## 2020-12-17 PROCEDURE — 49083 ABD PARACENTESIS W/IMAGING: CPT

## 2020-12-17 PROCEDURE — A7048 VACUUM DRAIN BOTTLE/TUBE KIT: HCPCS

## 2020-12-17 PROCEDURE — 49083 IR PARACENTESIS WITH IMAGING: ICD-10-PCS | Mod: ,,, | Performed by: RADIOLOGY

## 2020-12-17 NOTE — BRIEF OP NOTE
Radiology Post-Procedure Note     Pre Op Diagnosis: Recurrent painful, tense ascites  Post Op Diagnosis: Same     Procedure: U/S-guided therapeutic LVP     Procedure performed by: Zach Cha MD     Written Informed Consent Obtained: Yes  Specimen Removed: YES, 6000-cc of thin, straw-colored ascitic fluid  Estimated Blood Loss: none     Findings:   Successful therapeutic large-volume paracentesis with local anesthetic only and albumin infusion post PRN as indicated per institutional protocol. Patient tolerated the procedure well. No immediate post-procedural complications noted.       Thank you for considering IR for the care of your patient.      Zach Cha MD  Interventional Radiology

## 2020-12-17 NOTE — DISCHARGE SUMMARY
Radiology Discharge Summary      Hospital Course: No complications    Admit Date: 12/17/2020  Discharge Date: 12/17/2020     Instructions Given to Patient: Yes    Diet: Resume prior diet     Activity: activity as tolerated    Description of Condition on Discharge: Stable    Vital Signs (Most Recent):      Discharge Disposition: Home    Discharge Diagnosis:   61 y/o M with h/o recurrent painful, tense ascites s/p successful U/S-guided therapeutic LVP with local anesthetic only and albumin infusion post PRN as indicated per institutional protocol. Patient tolerated the procedure well. No immediate post-procedural complications noted.      Thank you for considering IR for the care of your patient.      Zach Cha MD  Interventional Radiology

## 2020-12-17 NOTE — H&P
Radiology History & Physical      SUBJECTIVE:     Chief Complaint: Recurrent painful, tense ascites     History of Present Illness:  Marvin Ray is a 60 y.o. male with PMHx of CKD III and combined systolic/diastolic CHF complicated by recurrent abdominal distension and pain 2/2 recurrent painful, tense ascites without TTP on PE to suggest SBP requiring frequent  therapeutic LVP.      A new outpatient IR consult placed for US-guided therapeutic large-volume paracentesis.    Past Medical History:   Diagnosis Date    Arthritis     Cellulitis     CKD (chronic kidney disease), stage III     Coronary artery disease     Diabetes mellitus     Diabetic retinopathy     Diabetic ulcer of left foot     Glaucoma     Gout     Hyperlipemia     Hypertension     ICD (implantable cardioverter-defibrillator) in place 11/02/2018    Left chest    Non-pressure chronic ulcer of other part of left foot with fat layer exposed 10/23/2018    PVD (peripheral vascular disease)     Type 2 diabetes mellitus with left diabetic foot ulcer 10/29/2018    Unsteady gait     uses a wheelchair     Past Surgical History:   Procedure Laterality Date    AHMED GLAUCOMA IMPLANT Left 2011    DONE AT [facility]    AORTOGRAPHY WITH SERIALOGRAPHY Left 4/30/2019    Procedure: AORTOGRAM, WITH SERIALOGRAPHY, LEFT LOWER EXTREMITY, POSSIBLE INTERVENTION;  Surgeon: Mitch Chan MD;  Location: [facility] OR;  Service: Vascular;  Laterality: Left;  RN PRE OP 4-23-19.  NO H & P, No Cosent  [place]    AORTOGRAPHY WITH SERIALOGRAPHY Right 10/6/2020    Procedure: AORTOGRAM, WITH SERIALOGRAPHY, RIGHT LOWER EXTREMITY ANGIOGRAM, POSSIBLE INTERVENTION;  Surgeon: Mitch Chan MD;  Location: [facility] OR;  Service: Vascular;  Laterality: Right;  RN PREOP 10/1/2020--COVID NEGATIVE ON 10/5    BAERVELDT GLAUCOMA IMPLANT Left 2012    WITH CATARACT EXTRACTION//DONE AT [facility]    CARDIAC CATHETERIZATION Left 05/2016    CARDIAC DEFIBRILLATOR PLACEMENT Left  11/02/2018    CATARACT EXTRACTION W/  INTRAOCULAR LENS IMPLANT Left 2012    WITH BAERVEDT//DONE AT [facility]    CATARACT EXTRACTION W/  INTRAOCULAR LENS IMPLANT Right 09/26/2018    COMPLEX ()    CB DESTRUCTION WITH CYCLO G6 Left 02/15/2017        CYST REMOVAL      DEBRIDEMENT OF FOOT  12/28/2018    Procedure: DEBRIDEMENT, FOOT;  Surgeon: Mitch Wall MD;  Location: [facility] OR;  Service: Vascular;;    DEBRIDEMENT OF FOOT  6/5/2019    Procedure: DEBRIDEMENT, FOOT;  Surgeon: Mitch Wall MD;  Location: [facility] OR;  Service: Vascular;;    EXAMINATION UNDER ANESTHESIA Left 12/5/2018    Procedure: Exam under anesthesia, left foot debridement, washout and all other indicated procedures;  Surgeon: Mitch Wall MD;  Location: [facility] OR;  Service: Vascular;  Laterality: Left;  1030AM START  RN PREOP 12/3/2018-----AICD  SEEN BY DR JAMES 12/4    EXAMINATION UNDER ANESTHESIA Left 2/13/2019    Procedure: LEFT FOOT WOUND DEBRIDEMENT, WASHOUT AND ALL OTHER INDICATED PROCEDURES;  Surgeon: Mitch Wall MD;  Location: [facility] OR;  Service: Vascular;  Laterality: Left;  requesting 1st case start  THIS CASE 1ST PER DR WALL ON 2/1/19 @ 844AM -[place]  RN PREOP 2/6/2019  HAS CARDIAC CLEARANCE    EXAMINATION UNDER ANESTHESIA Left 12/28/2018    Procedure: Exam under anesthesia, left foot debridement, left foot washout, possible left second through fifth metatarsal resection;  Surgeon: Mitch Wall MD;  Location: [facility] OR;  Service: Vascular;  Laterality: Left;  1:00pm start per julissa @ 8:58am  RN  PHONE PRE OP 12-27-18--=Pt coming from [facility] on [address]  NEED CONSENT    EXAMINATION UNDER ANESTHESIA Left 6/5/2019    Procedure: LEFT FOOT DEBRIDEMENT, WASHOUT AND ALL OTHER INDICATED PROCEDURES;  Surgeon: Mitch Wall MD;  Location: [facility] OR;  Service: Vascular;  Laterality: Left;  RN PRE OP 5-31-19------ICD------NEED CONSENT    EXAMINATION UNDER ANESTHESIA Right 11/9/2020     Procedure: RIGHT FOOT DEBRIDEMENT, WASHOUT AND ALL OTHER INDICATED PROCEDURES;  Surgeon: Mitch Chan MD;  Location: [facility] OR;  Service: Vascular;  Laterality: Right;  RN PREOP 10/27/2020, --COVID NEGATIVE ON 11/6, has cardiac clearance/[place] 10/27/2020, pt has ICD  NEEDS ORDERS    EYE SURGERY      CAT EXT OU    INCISION AND DRAINAGE Left 11/14/2018    Procedure: Incision and Drainage, left lower extremity debridement, washout;  Surgeon: Mitch Chan MD;  Location: [facility] OR;  Service: Vascular;  Laterality: Left;    INCISION AND DRAINAGE Left 11/16/2018    Procedure: INCISION AND DRAINAGE;  Surgeon: Mitch Chan MD;  Location: [facility] OR;  Service: Vascular;  Laterality: Left;    INCISION AND DRAINAGE Right 11/18/2020    Procedure: Debridement and washout right lower extremity wounds;  Surgeon: Mitch Chan MD;  Location: [facility] OR 2ND FLR;  Service: Vascular;  Laterality: Right;    INTRAOCULAR PROSTHESES INSERTION Right 9/26/2018    Procedure: INSERTION, IOL PROSTHESIS;  Surgeon: Perla Cortés MD;  Location: [facility] OR 1ST FLR;  Service: Ophthalmology;  Laterality: Right;    PERITONEOCENTESIS N/A 3/1/2019    Procedure: PARACENTESIS, ABDOMINAL, INITIAL;  Surgeon: Dosc Diagnostic Provider;  Location: [facility] OR;  Service: Radiology;  Laterality: N/A;    PERITONEOCENTESIS N/A 3/25/2019    Procedure: PARACENTESIS, ABDOMINAL, INITIAL;  Surgeon: Dosc Diagnostic Provider;  Location: [facility] OR;  Service: Radiology;  Laterality: N/A;    PERITONEOCENTESIS N/A 7/22/2019    Procedure: PARACENTESIS, ABDOMINAL, INITIAL;  Surgeon: Dosc Diagnostic Provider;  Location: [facility] OR;  Service: Radiology;  Laterality: N/A;    PERITONEOCENTESIS N/A 8/16/2019    Procedure: PARACENTESIS, ABDOMINAL, INITIAL;  Surgeon: Dosc Diagnostic Provider;  Location: [facility] OR;  Service: Radiology;  Laterality: N/A;    PERITONEOCENTESIS N/A 9/26/2019    Procedure: PARACENTESIS, ABDOMINAL;  Surgeon: Dosc Diagnostic  Provider;  Location: [facility] OR;  Service: Radiology;  Laterality: N/A;    PERITONEOCENTESIS N/A 10/11/2019    Procedure: PARACENTESIS, ABDOMINAL;  Surgeon: Dosc Diagnostic Provider;  Location: [facility] OR;  Service: Radiology;  Laterality: N/A;    PERITONEOCENTESIS N/A 10/31/2019    Procedure: PARACENTESIS, ABDOMINAL;  Surgeon: Dosc Diagnostic Provider;  Location: [facility] OR;  Service: Radiology;  Laterality: N/A;    PERITONEOCENTESIS N/A 11/18/2019    Procedure: PARACENTESIS, ABDOMINAL;  Surgeon: Dosc Diagnostic Provider;  Location: [facility] OR;  Service: Radiology;  Laterality: N/A;    PERITONEOCENTESIS N/A 12/16/2019    Procedure: PARACENTESIS, ABDOMINAL;  Surgeon: Dosc Diagnostic Provider;  Location: [facility] OR;  Service: Radiology;  Laterality: N/A;  2PM START    PERITONEOCENTESIS N/A 2/7/2020    Procedure: PARACENTESIS, ABDOMINAL;  Surgeon: Dosc Diagnostic Provider;  Location: [facility] OR;  Service: Radiology;  Laterality: N/A;  REQUESTED 10:30A START    PERITONEOCENTESIS N/A 2/19/2020    Procedure: PARACENTESIS, ABDOMINAL;  Surgeon: Dosc Diagnostic Provider;  Location: [facility] OR;  Service: Radiology;  Laterality: N/A;    PERITONEOCENTESIS N/A 2/28/2020    Procedure: PARACENTESIS, ABDOMINAL;  Surgeon: Dos Diagnostic Provider;  Location: [facility] OR;  Service: Radiology;  Laterality: N/A;  REQUESTED 10AM START    PHACOEMULSIFICATION OF CATARACT Right 9/26/2018    Procedure: PHACOEMULSIFICATION, CATARACT;  Surgeon: Perla Cortés MD;  Location: [address];  Service: Ophthalmology;  Laterality: Right;    REPEAT ABDOMINAL PARACENTESIS N/A 2/11/2019    Procedure: PARACENTESIS, ABDOMINAL, REPEAT;  Surgeon: Dosc Diagnostic Provider;  Location: [facility] OR;  Service: Radiology;  Laterality: N/A;  1PM START    REPEAT ABDOMINAL PARACENTESIS N/A 9/12/2019    Procedure: PARACENTESIS, ABDOMINAL, REPEAT;  Surgeon: Dosc Diagnostic Provider;  Location: [facility] OR;  Service: Radiology;  Laterality: N/A;  9AM START    REPEAT ABDOMINAL  PARACENTESIS N/A 12/2/2019    Procedure: PARACENTESIS, ABDOMINAL, REPEAT;  Surgeon: Dosc Diagnostic Provider;  Location: [facility] OR;  Service: Radiology;  Laterality: N/A;  3PM START    REPEAT ABDOMINAL PARACENTESIS N/A 1/10/2020    Procedure: PARACENTESIS, ABDOMINAL, REPEAT;  Surgeon: Dosc Diagnostic Provider;  Location: [facility] OR;  Service: Radiology;  Laterality: N/A;  1130AM START    REPEAT ABDOMINAL PARACENTESIS N/A 1/20/2020    Procedure: PARACENTESIS, ABDOMINAL, REPEAT;  Surgeon: Dosc Diagnostic Provider;  Location: [facility] OR;  Service: Radiology;  Laterality: N/A;  1PM START    REPEAT ABDOMINAL PARACENTESIS N/A 1/31/2020    Procedure: PARACENTESIS, ABDOMINAL, REPEAT;  Surgeon: Dosc Diagnostic Provider;  Location: [facility] OR;  Service: Radiology;  Laterality: N/A;  10AM START    REPEAT ABDOMINAL PARACENTESIS N/A 3/16/2020    Procedure: PARACENTESIS, ABDOMINAL, REPEAT;  Surgeon: Dosc Diagnostic Provider;  Location: [facility] OR;  Service: Radiology;  Laterality: N/A;  10AM START    REPEAT ABDOMINAL PARACENTESIS N/A 3/30/2020    Procedure: PARACENTESIS, ABDOMINAL, REPEAT;  Surgeon: Dosc Diagnostic Provider;  Location: [facility] OR;  Service: Radiology;  Laterality: N/A;    REPEAT ABDOMINAL PARACENTESIS N/A 4/9/2020    Procedure: PARACENTESIS, ABDOMINAL, REPEAT;  Surgeon: Dosc Diagnostic Provider;  Location: [facility] OR;  Service: Radiology;  Laterality: N/A;  1130AM START    REPEAT ABDOMINAL PARACENTESIS N/A 5/8/2020    Procedure: PARACENTESIS, ABDOMINAL, REPEAT;  Surgeon: Dosc Diagnostic Provider;  Location: [facility] OR;  Service: Radiology;  Laterality: N/A;  9AM START    REPEAT ABDOMINAL PARACENTESIS N/A 5/21/2020    Procedure: PARACENTESIS, ABDOMINAL, REPEAT;  Surgeon: Dosc Diagnostic Provider;  Location: [facility] OR;  Service: Radiology;  Laterality: N/A;  10AM START    REPEAT ABDOMINAL PARACENTESIS N/A 6/5/2020    Procedure: PARACENTESIS, ABDOMINAL, REPEAT;  Surgeon: Dosc Diagnostic Provider;  Location: [facility] OR;  Service:  Radiology;  Laterality: N/A;  NOON START    REPEAT ABDOMINAL PARACENTESIS N/A 7/10/2020    Procedure: PARACENTESIS, ABDOMINAL, REPEAT;  Surgeon: Dosc Diagnostic Provider;  Location: [facility] OR;  Service: Radiology;  Laterality: N/A;  1PM START    REPEAT ABDOMINAL PARACENTESIS N/A 8/20/2020    Procedure: PARACENTESIS, ABDOMINAL, REPEAT;  Surgeon: Dosc Diagnostic Provider;  Location: [facility] OR;  Service: Radiology;  Laterality: N/A;  1PM START    REPEAT ABDOMINAL PARACENTESIS N/A 9/4/2020    Procedure: PARACENTESIS, ABDOMINAL, REPEAT;  Surgeon: Dosc Diagnostic Provider;  Location: [facility] OR;  Service: Radiology;  Laterality: N/A;  11 AM START    REPEAT ABDOMINAL PARACENTESIS N/A 9/16/2020    Procedure: PARACENTESIS, ABDOMINAL, REPEAT;  Surgeon: Dos Diagnostic Provider;  Location: [facility] OR;  Service: Radiology;  Laterality: N/A;  START 2:00 P.M.    REPEAT ABDOMINAL PARACENTESIS N/A 9/28/2020    Procedure: PARACENTESIS, ABDOMINAL, REPEAT;  Surgeon: Dos Diagnostic Provider;  Location: [facility] OR;  Service: Radiology;  Laterality: N/A;  1 P.M. START    REPEAT ABDOMINAL PARACENTESIS N/A 11/3/2020    Procedure: PARACENTESIS, ABDOMINAL, REPEAT;  Surgeon: Dos Diagnostic Provider;  Location: [facility] OR;  Service: Radiology;  Laterality: N/A;  11AM START    REPEAT ABDOMINAL PARACENTESIS N/A 11/13/2020    Procedure: PARACENTESIS, ABDOMINAL, REPEAT;  Surgeon: Dosc Diagnostic Provider;  Location: [facility] OR;  Service: Radiology;  Laterality: N/A;  12PM START    REPEAT ABDOMINAL PARACENTESIS N/A 11/23/2020    Procedure: PARACENTESIS, ABDOMINAL, REPEAT;  Surgeon: Dosc Diagnostic Provider;  Location: [facility] OR;  Service: Radiology;  Laterality: N/A;    REPEAT ABDOMINAL PARACENTESIS N/A 12/1/2020    Procedure: PARACENTESIS, ABDOMINAL, REPEAT;  Surgeon: Dosc Diagnostic Provider;  Location: [facility] OR;  Service: Radiology;  Laterality: N/A;  11AM START    REPEAT ABDOMINAL PARACENTESIS N/A 12/8/2020    Procedure: PARACENTESIS, ABDOMINAL, REPEAT;   Surgeon: [facility] Diagnostic Provider;  Location: [facility] OR;  Service: Radiology;  Laterality: N/A;  93AM START    REVISION OF PROCEDURE INVOLVING GLAUCOMA DRAINAGE DEVICE Left 10/2/2019    EXTRUDING BAERVELDT /WITH PERICARDIAL PATCH GRAFT ()    Right foot surgery  10/2014    TOE AMPUTATION Right     first and second    TOE AMPUTATION Left 11/16/2018    Procedure: AMPUTATION, TOES  2-5;  Surgeon: Mitch Chan MD;  Location[facility] OR;  Service: Vascular;  Laterality: Left;    TOE AMPUTATION Left 12/5/2018    Procedure: AMPUTATION, TOE;  Surgeon: Mitch Chan MD;  Location: [facility] OR;  Service: Vascular;  Laterality: Left;  left great toe    TONSILLECTOMY       Home Meds:   Prior to Admission medications    Medication Sig Start Date End Date Taking? Authorizing Provider   allopurinoL (ZYLOPRIM) 300 MG tablet Take 1 tablet (300 mg total) by mouth once daily. 10/6/20   Rubén Davis MD   aspirin (ECOTRIN) 81 MG EC tablet Take 81 mg by mouth once daily.    Historical Provider   bisacodyl (DULCOLAX) 5 mg EC tablet Take 5 mg by mouth daily as needed for Constipation.    Historical Provider   brimonidine 0.2% (ALPHAGAN) 0.2 % Drop Place 1 drop into both eyes 3 (three) times daily. 12/12/19   Perla Cortés MD   carvediloL (COREG) 3.125 MG tablet TAKE 1 TABLET(3.125 MG) BY MOUTH TWICE DAILY 11/23/20   Rubén Davis MD   coenzyme Q10 100 mg capsule Take 100 mg by mouth every morning.    Historical Provider   collagenase (SANTYL) ointment Apply topically once daily. 12/15/20   Rubén Davis MD   dorzolamide-timolol 2-0.5% (COSOPT) 22.3-6.8 mg/mL ophthalmic solution Place 1 drop into both eyes 3 (three) times daily. 12/12/19   Perla Cortés MD   ezetimibe (ZETIA) 10 mg tablet Take 1 tablet (10 mg total) by mouth once daily. 11/23/20   Rubén Davis MD   fish oil-omega-3 fatty acids 300-1,000 mg capsule Take 1 capsule by mouth 2 (two) times daily. 1/23/19   Sara Ching MD  "  gabapentin (NEURONTIN) 100 MG capsule TAKE 2 CAPSULES(200 MG) BY MOUTH EVERY EVENING 12/15/20   Rubén Davis MD   HYDROcodone-acetaminophen (NORCO) 7.5-325 mg per tablet Take 1 tablet by mouth every 8 (eight) hours as needed for Pain. 10/20/20   Rubén Davis MD   insulin degludec-liraglutide (XULTOPHY 100/3.6) 100 unit-3.6 mg /mL (3 mL) InPn Inject 35 Units into the skin once daily. 12/9/20   Sheryl Madison NP   loratadine (CLARITIN) 10 mg tablet Take 10 mg by mouth daily as needed for Allergies.    Historical Provider   MULTIVIT,THER IRON,CA,FA & MIN (MULTIVITAMIN) Tab Take 1 tablet by mouth every morning.     Historical Provider   pen needle, diabetic (BD ULTRA-FINE AMADOR PEN NEEDLE) 32 gauge x 5/32" Ndle 1 Device by Misc.(Non-Drug; Combo Route) route 2 (two) times a day. 9/11/20   Sheryl Madison NP   torsemide (DEMADEX) 20 MG Tab Take 4 tablets (80 mg total) by mouth 2 (two) times daily. 11/5/20 12/15/20  Rubén Davis MD     Anticoagulants/Antiplatelets: no anticoagulation    Allergies:   Review of patient's allergies indicates:   Allergen Reactions    Statins-hmg-coa reductase inhibitors      Generalized Pain    Onglyza [saxagliptin]     Penicillins Rash     Sedation History:  no adverse reactions     Review of Systems:   Hematological: no known coagulopathies  Respiratory: positive for - orthopnea and shortness of breath  Cardiovascular: positive for - dyspnea on exertion, orthopnea and shortness of breath  Gastrointestinal: positive for - abdominal pain and distension  Genito-Urinary: no dysuria, trouble voiding, or hematuria  Musculoskeletal: negative  Neurological: no TIA or stroke symptoms      OBJECTIVE:     Vital Signs (Most Recent)     Physical Exam:  General: no acute distress  Mental Status: alert and oriented to person, place and time  HEENT: normocephalic, atraumatic  Chest: mild labored breathing  Heart: regular heart rate  Abdomen: +distended. No TTP/r/g.  Extremity: moves all " extremities    Laboratory  Lab Results   Component Value Date    INR 1.1 05/31/2019       Lab Results   Component Value Date    WBC 5.33 12/16/2020    HGB 12.1 (L) 12/16/2020    HCT 38.7 (L) 12/16/2020    MCV 89 12/16/2020     12/16/2020      Lab Results   Component Value Date     (H) 12/16/2020     12/16/2020    K 3.9 12/16/2020     12/16/2020    CO2 28 12/16/2020    BUN 30 (H) 12/16/2020    CREATININE 1.1 12/16/2020    CALCIUM 8.4 (L) 12/16/2020    MG 2.1 05/31/2019    ALT 9 (L) 12/16/2020    AST 13 12/16/2020    ALBUMIN 2.6 (L) 12/16/2020    BILITOT 0.5 12/16/2020     ASSESSMENT/PLAN:     59 y/o M with CKD III and combined systolic/diastolic CHF complicated by recurrent abdominal distension and pain 2/2 recurrent painful, tense ascites without TTP on PE to suggest SBP, requiring frequent large-volume therapeutic paracentesis.      1. Recurrent painful, tense ascites - Will attempt U/S-guided therapeutic paracentesis with local anesthetic only and albumin infusion post PRN per institutional protocol.      Risks (including, but not limited to, pain, bleeding, infection, damage to nearby structures, failure to obtain sufficient material for a diagnosis, the need for additional procedures, and death), benefits, and alternatives were discussed with the patient. All questions were answered to the best of my abilities. The patient wishes to proceed with the procedure. Written informed consent was obtained.     Thank you for considering IR for the care of your patient.      Zach Cha MD  Interventional Radiology

## 2020-12-18 ENCOUNTER — ANESTHESIA (OUTPATIENT)
Dept: SURGERY | Facility: HOSPITAL | Age: 60
End: 2020-12-18
Payer: MEDICAID

## 2020-12-18 ENCOUNTER — HOSPITAL ENCOUNTER (OUTPATIENT)
Facility: HOSPITAL | Age: 60
Discharge: HOME | End: 2020-12-18
Attending: PODIATRIST | Admitting: PODIATRIST
Payer: MEDICAID

## 2020-12-18 VITALS
BODY MASS INDEX: 29.92 KG/M2 | OXYGEN SATURATION: 93 % | DIASTOLIC BLOOD PRESSURE: 56 MMHG | WEIGHT: 208.56 LBS | TEMPERATURE: 98 F | HEART RATE: 78 BPM | RESPIRATION RATE: 16 BRPM | SYSTOLIC BLOOD PRESSURE: 116 MMHG

## 2020-12-18 DIAGNOSIS — Z01.818 PRE-OP TESTING: ICD-10-CM

## 2020-12-18 DIAGNOSIS — E08.621 DIABETES MELLITUS DUE TO UNDERLYING CONDITION WITH FOOT ULCER, WITHOUT LONG-TERM CURRENT USE OF INSULIN: ICD-10-CM

## 2020-12-18 DIAGNOSIS — M86.8X7 OTHER OSTEOMYELITIS OF RIGHT FOOT: Primary | ICD-10-CM

## 2020-12-18 DIAGNOSIS — L97.519 TYPE 2 DIABETES MELLITUS WITH RIGHT DIABETIC FOOT ULCER: Primary | ICD-10-CM

## 2020-12-18 DIAGNOSIS — L97.509 DIABETES MELLITUS DUE TO UNDERLYING CONDITION WITH FOOT ULCER, WITHOUT LONG-TERM CURRENT USE OF INSULIN: ICD-10-CM

## 2020-12-18 DIAGNOSIS — E11.42 DM TYPE 2 WITH DIABETIC PERIPHERAL NEUROPATHY: ICD-10-CM

## 2020-12-18 DIAGNOSIS — E11.621 TYPE 2 DIABETES MELLITUS WITH RIGHT DIABETIC FOOT ULCER: Primary | ICD-10-CM

## 2020-12-18 LAB
GRAM STN SPEC: NORMAL
POCT GLUCOSE: 188 MG/DL (ref 70–110)

## 2020-12-18 PROCEDURE — 01462 ANES CLSD PX LOWER L/A/F: CPT | Performed by: PODIATRIST

## 2020-12-18 PROCEDURE — 20220 PR BONE BIOPSY,TROCAR/NEEDLE SUPERF: ICD-10-PCS | Mod: 51,,, | Performed by: PODIATRIST

## 2020-12-18 PROCEDURE — 87075 CULTR BACTERIA EXCEPT BLOOD: CPT

## 2020-12-18 PROCEDURE — 27201423 OPTIME MED/SURG SUP & DEVICES STERILE SUPPLY: Performed by: PODIATRIST

## 2020-12-18 PROCEDURE — 88311 PR  DECALCIFY TISSUE: ICD-10-PCS | Mod: 26,,, | Performed by: PATHOLOGY

## 2020-12-18 PROCEDURE — 63600175 PHARM REV CODE 636 W HCPCS: Performed by: ANESTHESIOLOGY

## 2020-12-18 PROCEDURE — D9220A PRA ANESTHESIA: ICD-10-PCS | Mod: ANES,,, | Performed by: ANESTHESIOLOGY

## 2020-12-18 PROCEDURE — 20220 BONE BIOPSY TROCAR/NDL SUPFC: CPT | Mod: 51,,, | Performed by: PODIATRIST

## 2020-12-18 PROCEDURE — 36000707: Performed by: PODIATRIST

## 2020-12-18 PROCEDURE — 87070 CULTURE OTHR SPECIMN AEROBIC: CPT

## 2020-12-18 PROCEDURE — D9220A PRA ANESTHESIA: ICD-10-PCS | Mod: CRNA,,, | Performed by: NURSE ANESTHETIST, CERTIFIED REGISTERED

## 2020-12-18 PROCEDURE — 11044 PR DEBRIDEMENT, SKIN, SUB-Q TISSUE,MUSCLE,BONE,=<20 SQ CM: ICD-10-PCS | Mod: ,,, | Performed by: PODIATRIST

## 2020-12-18 PROCEDURE — 37000008 HC ANESTHESIA 1ST 15 MINUTES: Performed by: PODIATRIST

## 2020-12-18 PROCEDURE — 88311 DECALCIFY TISSUE: CPT | Mod: 59 | Performed by: PATHOLOGY

## 2020-12-18 PROCEDURE — 71000015 HC POSTOP RECOV 1ST HR: Performed by: PODIATRIST

## 2020-12-18 PROCEDURE — 87206 SMEAR FLUORESCENT/ACID STAI: CPT | Mod: 91

## 2020-12-18 PROCEDURE — 87116 MYCOBACTERIA CULTURE: CPT | Mod: 59

## 2020-12-18 PROCEDURE — 25000003 PHARM REV CODE 250: Performed by: SURGERY

## 2020-12-18 PROCEDURE — 11044 DBRDMT BONE 1ST 20 SQ CM/<: CPT | Mod: ,,, | Performed by: PODIATRIST

## 2020-12-18 PROCEDURE — 63600175 PHARM REV CODE 636 W HCPCS: Performed by: NURSE ANESTHETIST, CERTIFIED REGISTERED

## 2020-12-18 PROCEDURE — 88305 TISSUE EXAM BY PATHOLOGIST: CPT | Mod: 26,,, | Performed by: PATHOLOGY

## 2020-12-18 PROCEDURE — D9220A PRA ANESTHESIA: Mod: ANES,,, | Performed by: ANESTHESIOLOGY

## 2020-12-18 PROCEDURE — 87106 FUNGI IDENTIFICATION YEAST: CPT | Mod: 59

## 2020-12-18 PROCEDURE — 25000003 PHARM REV CODE 250: Performed by: PODIATRIST

## 2020-12-18 PROCEDURE — 25000003 PHARM REV CODE 250: Performed by: NURSE ANESTHETIST, CERTIFIED REGISTERED

## 2020-12-18 PROCEDURE — 36000706: Performed by: PODIATRIST

## 2020-12-18 PROCEDURE — 88305 TISSUE EXAM BY PATHOLOGIST: ICD-10-PCS | Mod: 26,,, | Performed by: PATHOLOGY

## 2020-12-18 PROCEDURE — 87205 SMEAR GRAM STAIN: CPT | Mod: 59

## 2020-12-18 PROCEDURE — 71000016 HC POSTOP RECOV ADDL HR: Performed by: PODIATRIST

## 2020-12-18 PROCEDURE — 87015 SPECIMEN INFECT AGNT CONCNTJ: CPT

## 2020-12-18 PROCEDURE — D9220A PRA ANESTHESIA: Mod: CRNA,,, | Performed by: NURSE ANESTHETIST, CERTIFIED REGISTERED

## 2020-12-18 PROCEDURE — 88305 TISSUE EXAM BY PATHOLOGIST: CPT | Performed by: PATHOLOGY

## 2020-12-18 PROCEDURE — 87102 FUNGUS ISOLATION CULTURE: CPT | Mod: 59

## 2020-12-18 PROCEDURE — 88311 DECALCIFY TISSUE: CPT | Mod: 26,,, | Performed by: PATHOLOGY

## 2020-12-18 PROCEDURE — 37000009 HC ANESTHESIA EA ADD 15 MINS: Performed by: PODIATRIST

## 2020-12-18 DEVICE — IMPLANTABLE DEVICE: Type: IMPLANTABLE DEVICE | Site: FOOT | Status: FUNCTIONAL

## 2020-12-18 RX ORDER — ONDANSETRON 2 MG/ML
4 INJECTION INTRAMUSCULAR; INTRAVENOUS ONCE AS NEEDED
Status: DISCONTINUED | OUTPATIENT
Start: 2020-12-18 | End: 2020-12-18 | Stop reason: HOSPADM

## 2020-12-18 RX ORDER — ETOMIDATE 2 MG/ML
INJECTION INTRAVENOUS
Status: DISCONTINUED | OUTPATIENT
Start: 2020-12-18 | End: 2020-12-18

## 2020-12-18 RX ORDER — CLINDAMYCIN PHOSPHATE 900 MG/50ML
900 INJECTION, SOLUTION INTRAVENOUS ONCE
Status: DISCONTINUED | OUTPATIENT
Start: 2020-12-18 | End: 2020-12-18 | Stop reason: HOSPADM

## 2020-12-18 RX ORDER — DOXYCYCLINE 100 MG/1
100 CAPSULE ORAL 2 TIMES DAILY
Qty: 20 CAPSULE | Refills: 0 | Status: SHIPPED | OUTPATIENT
Start: 2020-12-18 | End: 2021-01-08 | Stop reason: CLARIF

## 2020-12-18 RX ORDER — FENTANYL CITRATE 50 UG/ML
25 INJECTION, SOLUTION INTRAMUSCULAR; INTRAVENOUS EVERY 5 MIN PRN
Status: DISCONTINUED | OUTPATIENT
Start: 2020-12-18 | End: 2020-12-18 | Stop reason: HOSPADM

## 2020-12-18 RX ORDER — SODIUM CHLORIDE 0.9 % (FLUSH) 0.9 %
3 SYRINGE (ML) INJECTION
Status: DISCONTINUED | OUTPATIENT
Start: 2020-12-18 | End: 2020-12-18 | Stop reason: HOSPADM

## 2020-12-18 RX ORDER — HYDROMORPHONE HYDROCHLORIDE 2 MG/ML
0.2 INJECTION, SOLUTION INTRAMUSCULAR; INTRAVENOUS; SUBCUTANEOUS EVERY 5 MIN PRN
Status: DISCONTINUED | OUTPATIENT
Start: 2020-12-18 | End: 2020-12-18 | Stop reason: HOSPADM

## 2020-12-18 RX ORDER — LIDOCAINE HYDROCHLORIDE 20 MG/ML
INJECTION INTRAVENOUS
Status: DISCONTINUED | OUTPATIENT
Start: 2020-12-18 | End: 2020-12-18

## 2020-12-18 RX ORDER — ACETAMINOPHEN 10 MG/ML
1000 INJECTION, SOLUTION INTRAVENOUS ONCE
Status: DISCONTINUED | OUTPATIENT
Start: 2020-12-18 | End: 2020-12-18 | Stop reason: HOSPADM

## 2020-12-18 RX ORDER — HYDROCODONE BITARTRATE AND ACETAMINOPHEN 5; 325 MG/1; MG/1
1 TABLET ORAL EVERY 4 HOURS PRN
Status: DISCONTINUED | OUTPATIENT
Start: 2020-12-18 | End: 2020-12-18 | Stop reason: HOSPADM

## 2020-12-18 RX ORDER — PROPOFOL 10 MG/ML
VIAL (ML) INTRAVENOUS
Status: DISCONTINUED | OUTPATIENT
Start: 2020-12-18 | End: 2020-12-18

## 2020-12-18 RX ORDER — OXYCODONE AND ACETAMINOPHEN 5; 325 MG/1; MG/1
1 TABLET ORAL EVERY 6 HOURS PRN
Qty: 28 TABLET | Refills: 0 | Status: SHIPPED | OUTPATIENT
Start: 2020-12-18 | End: 2021-01-19 | Stop reason: SDUPTHER

## 2020-12-18 RX ORDER — PHENYLEPHRINE HYDROCHLORIDE 10 MG/ML
INJECTION INTRAVENOUS
Status: DISCONTINUED | OUTPATIENT
Start: 2020-12-18 | End: 2020-12-18

## 2020-12-18 RX ORDER — BUPIVACAINE HYDROCHLORIDE 2.5 MG/ML
INJECTION, SOLUTION INFILTRATION; PERINEURAL
Status: DISCONTINUED | OUTPATIENT
Start: 2020-12-18 | End: 2020-12-18 | Stop reason: HOSPADM

## 2020-12-18 RX ORDER — LIDOCAINE HYDROCHLORIDE 10 MG/ML
INJECTION INFILTRATION; PERINEURAL
Status: DISCONTINUED | OUTPATIENT
Start: 2020-12-18 | End: 2020-12-18 | Stop reason: HOSPADM

## 2020-12-18 RX ADMIN — SODIUM CHLORIDE, SODIUM LACTATE, POTASSIUM CHLORIDE, AND CALCIUM CHLORIDE: .6; .31; .03; .02 INJECTION, SOLUTION INTRAVENOUS at 06:12

## 2020-12-18 RX ADMIN — ETOMIDATE 1 MG: 2 INJECTION, SOLUTION INTRAVENOUS at 08:12

## 2020-12-18 RX ADMIN — PROPOFOL 10 MG: 10 INJECTION, EMULSION INTRAVENOUS at 08:12

## 2020-12-18 RX ADMIN — PHENYLEPHRINE HYDROCHLORIDE 200 MCG: 10 INJECTION INTRAVENOUS at 07:12

## 2020-12-18 RX ADMIN — ETOMIDATE 6 MG: 2 INJECTION, SOLUTION INTRAVENOUS at 07:12

## 2020-12-18 RX ADMIN — PHENYLEPHRINE HYDROCHLORIDE 100 MCG: 10 INJECTION INTRAVENOUS at 09:12

## 2020-12-18 RX ADMIN — PROPOFOL 10 MG: 10 INJECTION, EMULSION INTRAVENOUS at 07:12

## 2020-12-18 RX ADMIN — PROPOFOL 10 MG: 10 INJECTION, EMULSION INTRAVENOUS at 09:12

## 2020-12-18 RX ADMIN — ETOMIDATE 1 MG: 2 INJECTION, SOLUTION INTRAVENOUS at 07:12

## 2020-12-18 RX ADMIN — CLINDAMYCIN IN 5 PERCENT DEXTROSE 900 MG: 18 INJECTION, SOLUTION INTRAVENOUS at 07:12

## 2020-12-18 RX ADMIN — ETOMIDATE 2 MG: 2 INJECTION, SOLUTION INTRAVENOUS at 07:12

## 2020-12-18 RX ADMIN — PROPOFOL 20 MG: 10 INJECTION, EMULSION INTRAVENOUS at 07:12

## 2020-12-18 RX ADMIN — PROPOFOL 40 MG: 10 INJECTION, EMULSION INTRAVENOUS at 07:12

## 2020-12-18 RX ADMIN — Medication 75 MG: at 07:12

## 2020-12-18 RX ADMIN — PHENYLEPHRINE HYDROCHLORIDE 100 MCG: 10 INJECTION INTRAVENOUS at 07:12

## 2020-12-18 RX ADMIN — ETOMIDATE 1 MG: 2 INJECTION, SOLUTION INTRAVENOUS at 09:12

## 2020-12-18 NOTE — DISCHARGE INSTRUCTIONS
DIET: You may resume your home diet. If nausea is present, increase your diet gradually with fluids and bland foods    ACTIVITY LEVEL: You have received sedation or an anesthetic, you may feel sleepy for several hours. Rest until you are more awake. Gradually resume your normal activities    No driving, alcoholic beverages or signing legal documents for next 24 hours or while taking pain medication.       CALL THE DOCTOR:    For any obvious bleeding (some dried blood over the incision is normal).      Redness, swelling, foul smell around incision or fever over 101.   Shortness of breath, Coughing up Bloody sputum, Pains or Swelling in your Calves .   Persistent pain or nausea not relieved by medication.    If any unusual problems or difficulties occur contact your doctor. If you cannot contact your doctor but feel your signs and symptoms warrant a physicians attention return to the emergency room.    Fall Prevention  Millions of people fall every year and injure themselves. You may have had anesthesia or sedation which may increase your risk of falling. You may have health issues that put you at an increased risk of falling.     Here are ways to reduce your risk of falling.  ·   · Make your home safe by keeping walkways clear of objects you may trip over.  · Use non-slip pads under rugs. Do not use area rugs or small throw rugs.  · Use non-slip mats in bathtubs and showers.  · Install handrails and lights on staircases.  · Do not walk in poorly lit areas.  · Do not stand on chairs or wobbly ladders.  · Use caution when reaching overhead or looking upward. This position can cause a loss of balance.  · Be sure your shoes fit properly, have non-slip bottoms and are in good condition.   · Wear shoes both inside and out. Avoid going barefoot or wearing slippers.  · Be cautious when going up and down stairs, curbs, and when walking on uneven sidewalks.  · If your balance is poor, consider using a cane or  walker.  · If your fall was related to alcohol use, stop or limit alcohol intake.   · If your fall was related to use of sleeping medicines, talk to your doctor about this. You may need to reduce your dosage at bedtime if you awaken during the night to go to the bathroom.    · To reduce the need for nighttime bathroom trips:  ¨ Avoid drinking fluids for several hours before going to bed  ¨ Empty your bladder before going to bed  ¨ Men can keep a urinal at the bedside  · Stay as active as you can. Balance, flexibility, strength, and endurance all come from exercise. They all play a role in preventing falls. Ask your healthcare provider which types of activity are right for you.  · Get your vision checked on a regular basis.  · If you have pets, know where they are before you stand up or walk so you don't trip over them.  · Use night lights.

## 2020-12-18 NOTE — TRANSFER OF CARE
Anesthesia Transfer of Care Note    Patient: Marvin Ray    Procedure(s) Performed: Procedure(s) (LRB):  IRRIGATION AND DEBRIDEMENT (Right)  Biopsy- trocar biopsy (Right)    Patient location: [facility]    Anesthesia Type: MAC    Transport from OR: Transported from OR on room air with adequate spontaneous ventilation    Post pain: adequate analgesia    Post assessment: no apparent anesthetic complications and tolerated procedure well    Post vital signs: stable    Level of consciousness: awake, alert and oriented    Nausea/Vomiting: no nausea/vomiting    Complications: none    Transfer of care protocol was followed      Last vitals:   Visit Vitals  BP (!) 116/56 (BP Location: Left arm, Patient Position: Lying)   Pulse 78   Temp 36.7 °C (98.1 °F) (Oral)   Resp 16   Wt 94.6 kg (208 lb 8.9 oz)   SpO2 (!) 93%   BMI 29.92 kg/m²

## 2020-12-20 NOTE — H&P
[facility]  History & Physical    SUBJECTIVE:   Patient ID: Marvin Ray is a 60 y.o. male.   Chief Complaint: No chief complaint on file.     Patient ambulated to exam bed from wheelchair for short distance. Reports that he is still limiting his activities. Note large amount of thick green drainage from Left TMA/Medial Foot that is new. Dressing last changed 2 days ago per sister using iodosorb as prescribed. Right Medial Malleolus unchanged. New blistered area to Right Anterior Lower Leg that is superficial and appeared prior to patient's paracentesis. Right Heel wound is unchanged. Bone remains exposed with avascular tissue formation. Right 3rd Toe Ulcer unchanged. Continuing antibiotic powder/cream to Right Heel/3rd Toe and adding it to Left Foot this week. Cleaned all wounds with acetic acid to address suspected pseudomonas. Discussed MD Chan's recommendation for Podiatry to debride Right Heel wound with graft placement over bone. FRANCINE Champion will perform surgery and consent was completed. Message sent to patient's PCP MD Page regarding need for patient to be approved for surgery and appt set with NP at [facility].     12/18/20: Plan for right foot debridement today with application of skin substitute.       Review of Systems   Constitutional: Negative. Negative for activity change, appetite change, chills, fatigue and fever.   HENT: Negative.   Eyes: Negative.   Respiratory: Negative. Negative for cough and shortness of breath.   Cardiovascular: Positive for leg swelling. Negative for chest pain.   Gastrointestinal: Negative for abdominal pain, anal bleeding, blood in stool, constipation, diarrhea, nausea and vomiting.   Musculoskeletal: Positive for joint swelling and myalgias.   Skin: Positive for wound.   Neurological: Positive for numbness. Negative for weakness.   + paresthesia   Psychiatric/Behavioral: Negative.     Objective:     Physical Exam   Vitals signs reviewed.    Constitutional:   Appearance: Normal appearance.   HENT:   Head: Normocephalic and atraumatic.   Nose: Nose normal.   Mouth/Throat:   Mouth: Mucous membranes are moist.   Pharynx: Oropharynx is clear.   Eyes:   Extraocular Movements: Extraocular movements intact.   Pupils: Pupils are equal, round, and reactive to light.   Neck:   Musculoskeletal: Normal range of motion and neck supple.   Cardiovascular:   Rate and Rhythm: Normal rate and regular rhythm.   Pulmonary:   Effort: Pulmonary effort is normal. No respiratory distress.   Breath sounds: No wheezing.   Abdominal:   General: There is distension.   Palpations: Abdomen is soft.   Tenderness: There is no abdominal tenderness. There is no guarding.   Musculoskeletal:   Right lower leg: Edema present.   Left lower leg: Edema present.   Comments: Fat pad atrophy to heels and met heads bilateral  Skin:   General: Skin is warm and dry.   Findings: Wound (see description) present.   Neurological:   Mental Status: He is alert and oriented to person, place, and time.   Sensory: No sensory deficit.         Vitals:    12/11/20 1030   BP: 127/60   Pulse: 78   Temp: 97.5 °F (36.4 °C)     Assessment:       ICD-10-CM ICD-9-CM   1. DM type 2 with diabetic peripheral neuropathy  E11.42 250.60     357.2   2. Type 2 diabetes mellitus with left diabetic foot ulcer  E11.621 250.80    L97.529 707.15   3. Atherosclerosis of left lower extremity with ulceration of midfoot  I70.244 440.23     707.14   4. Diabetic ulcer of right lower leg  E11.622 250.80    L97.919 707.10   5. Diabetic ulcer of toe of right foot associated with type 2 diabetes mellitus, with bone involvement without evidence of necrosis  E11.621 250.80    L97.516 707.15   6. Type 2 diabetes mellitus with right diabetic foot ulcer  E11.621 250.80    L97.519 707.15   7. Atherosclerosis of native artery of right lower extremity with ulceration of ankle  I70.233 440.23     707.13   8. Venous stasis ulcer with edema of  lower leg  I83.009 454.0    I83.899 782.3    L97.909     R60.9    9. Atherosclerosis of native artery of right leg with ulceration  I70.239 440.23     707.9                                                                                                                                                                          05/08/20 1015    Pre-existing: Yes   Primary Wound Type: Diabetic ulc   Side: Left   Orientation: distal   Location: Foot   Wound Number (optional):    Ankle-Brachial Index:    Pulses:    Removal Indication and Assessment:    Wound Outcome:    (Retired) Wound Type:    (Retired) Wound Length (cm):    (Retired) Wound Width (cm):    (Retired) Depth (cm):    Wound Description (Comments):    Removal Indications:    Wound Image  12/11/20 0900   Wound WDL ex 12/11/20 0900   Dressing Appearance Moist drainage 12/11/20 0900   Drainage Amount Large 12/11/20 0900   Drainage Characteristics/Odor Green 12/11/20 0900   Appearance Pink;Red;Yellow 12/11/20 0900   Tissue loss description Full thickness 12/11/20 0900   Red (%), Wound Tissue Color 90 % 12/11/20 0900   Yellow (%), Wound Tissue Color 10 % 12/11/20 0900   Periwound Area Scar tissue 12/11/20 0900   Wound Length (cm) 2.7 cm 12/11/20 0900   Wound Width (cm) 5.6 cm 12/11/20 0900   Wound Depth (cm) 0.2 cm 12/11/20 0900   Wound Volume (cm^3) 3.02 cm^3 12/11/20 0900   Wound Surface Area (cm^2) 15.12 cm^2 12/11/20 0900   Care Cleansed with:;Antimicrobial agent;Soap and water;Sterile normal saline 12/11/20 0900   Dressing Changed 12/11/20 0900   Periwound Care Moisture barrier applied 12/11/20 0900   Off Loading Football dressing 12/11/20 0900      Wound 08/11/20 1051 Diabetic Ulcer Right medial Malleolus/Ankle (Active)   08/11/20 1051    Pre-existing: No   Primary Wound Type: Diabetic ulc   Side: Right   Orientation: medial   Location: Malleolus/Ankle   Wound Number (optional):    Ankle-Brachial Index:    Pulses:    Removal Indication and Assessment:    Wound  Outcome:    (Retired) Wound Type:    (Retired) Wound Length (cm):    (Retired) Wound Width (cm):    (Retired) Depth (cm):    Wound Description (Comments):    Removal Indications:    Wound Image  12/11/20 0900   Wound WDL ex 12/11/20 0900   Dressing Appearance Moist drainage 12/11/20 0900   Drainage Amount Small 12/11/20 0900   Drainage Characteristics/Odor Serosanguineous 12/11/20 0900   Appearance Pink;Not granulating 12/11/20 0900   Tissue loss description Partial thickness 12/11/20 0900   Red (%), Wound Tissue Color 100 % 12/11/20 0900   Periwound Area Intact 12/11/20 0900   Wound Edges Open 12/11/20 0900   Wound Length (cm) 1.1 cm 12/11/20 0900   Wound Width (cm) 1.5 cm 12/11/20 0900   Wound Depth (cm) 0.15 cm 12/11/20 0900   Wound Volume (cm^3) 0.25 cm^3 12/11/20 0900   Wound Surface Area (cm^2) 1.65 cm^2 12/11/20 0900   Care Cleansed with:;Antimicrobial agent;Soap and water;Sterile normal saline 12/11/20 0900   Dressing Changed 12/11/20 0900   Periwound Care Skin barrier film applied 12/11/20 0900      Wound 12/11/20 0931 Venous Ulcer Right lower Leg (Active)   12/11/20 0931    Pre-existing: Yes   Primary Wound Type: Venous ulcer   Side: Right   Orientation: lower   Location: Leg   Wound Number (optional):    Ankle-Brachial Index:    Pulses:    Removal Indication and Assessment:    Wound Outcome:    (Retired) Wound Type:    (Retired) Wound Length (cm):    (Retired) Wound Width (cm):    (Retired) Depth (cm):    Wound Description (Comments):    Removal Indications:    Wound Image  12/11/20 0900   Wound WDL ex 12/11/20 0900   Dressing Appearance Moist drainage 12/11/20 0900   Drainage Amount Moderate 12/11/20 0900   Drainage Characteristics/Odor Serous 12/11/20 0900   Appearance Blistered;Red 12/11/20 0900   Tissue loss description Partial thickness 12/11/20 0900   Red (%), Wound Tissue Color 100 % 12/11/20 0900   Periwound Area Swelling 12/11/20 0900   Wound Edges Jagged 12/11/20 0900   Wound Length (cm) 2.2 cm  12/11/20 0900   Wound Width (cm) 3.2 cm 12/11/20 0900   Wound Depth (cm) 0.1 cm 12/11/20 0900   Wound Volume (cm^3) 0.7 cm^3 12/11/20 0900   Wound Surface Area (cm^2) 7.04 cm^2 12/11/20 0900   Care Cleansed with:;Soap and water;Sterile normal saline;Antimicrobial agent 12/11/20 0900   Dressing Applied;Foam;Silver 12/11/20 0900   Periwound Care Skin barrier film applied 12/11/20 0900      Incision/Site 06/16/20 0930 Right Toe, third anterior (Active)   06/16/20 0930   Present Prior to Hospital Arrival?: Yes   Side: Right   Location: Toe, third   Orientation: anterior   Incision Type:    Closure Method:    Additional Comments:    Removal Indication and Assessment:    Wound Outcome:    Removal Indications:    Wound Image  12/11/20 0900   Incision WDL ex 12/11/20 0900   Dressing Appearance Dry;Intact 12/11/20 0900   Drainage Amount Scant 12/11/20 0900   Drainage Characteristics/Odor Creamy;Sanguineous 12/11/20 0900   Appearance Pink;Not granulating 12/11/20 0900   Red (%), Wound Tissue Color 100 % 12/11/20 0900   Periwound Area Intact 12/11/20 0900   Wound Edges Defined 12/11/20 0900   Wound Length (cm) 0.2 cm 12/11/20 0900   Wound Width (cm) 0.4 cm 12/11/20 0900   Wound Depth (cm) 0.2 cm 12/11/20 0900   Wound Volume (cm^3) 0.02 cm^3 12/11/20 0900   Wound Surface Area (cm^2) 0.08 cm^2 12/11/20 0900   Care Cleansed with:;Antimicrobial agent;Soap and water;Sterile normal saline 12/11/20 0900   Dressing Changed 12/11/20 0900   Off Loading Football dressing;Off loading shoe 12/11/20 0900      Incision/Site 11/09/20 1240 Right Leg (Active)   11/09/20 1240   Present Prior to Hospital Arrival?:    Side: Right   Location: Leg   Orientation:    Incision Type:    Closure Method:    Additional Comments:    Removal Indication and Assessment:    Wound Outcome:    Removal Indications:    Incision WDL WDL 12/11/20 0900   Appearance Epithelialization 12/11/20 0900   Wound Edges Rolled/closed 12/11/20 0900   Wound Length (cm) 0 cm  12/11/20 0900   Wound Width (cm) 0 cm 12/11/20 0900   Wound Depth (cm) 0 cm 12/11/20 0900   Wound Volume (cm^3) 0 cm^3 12/11/20 0900   Wound Surface Area (cm^2) 0 cm^2 12/11/20 0900   Care Cleansed with:;Soap and water;Sterile normal saline 12/11/20 0900   Dressing Changed;Foam 12/11/20 0900   Periwound Care Skin barrier film applied 12/11/20 0900      Incision/Site 11/18/20 1458 Right Heel (Active)   11/18/20 1458   Present Prior to Hospital Arrival?:    Side: Right   Location: Heel   Orientation:    Incision Type:    Closure Method:    Additional Comments:    Removal Indication and Assessment:    Wound Outcome:    Removal Indications:    Wound Image  12/11/20 0900   Incision WDL ex 12/11/20 0900   Dressing Appearance Moist drainage 12/11/20 0900   Drainage Amount Moderate 12/11/20 0900   Drainage Characteristics/Odor Serosanguineous 12/11/20 0900   Appearance Yellow;Slough;Adipose;Bone;Red;Not granulating 12/11/20 0900   Red (%), Wound Tissue Color 10 % 12/11/20 0900   Yellow (%), Wound Tissue Color 90 % 12/11/20 0900   Periwound Area Intact 12/11/20 0900   Wound Edges Open;Defined 12/11/20 0900   Wound Length (cm) 10 cm 12/11/20 0900   Wound Width (cm) 9.4 cm 12/11/20 0900   Wound Depth (cm) 1.6 cm 12/11/20 0900   Wound Volume (cm^3) 150.4 cm^3 12/11/20 0900   Wound Surface Area (cm^2) 94 cm^2 12/11/20 0900   Undermining (depth (cm)/location) 1.2 (cm) at 6 o'clock 12/11/20 0900   Care Cleansed with:;Soap and water;Sterile normal saline;Antimicrobial agent 12/11/20 0900   Dressing Changed 12/11/20 0900   Periwound Care Moisture barrier applied 12/11/20 0900   Off Loading Football dressing;Off loading shoe 12/11/20 0900       Plan:           Plan for surgical debridement with trocar biopsy today and skin substitute application.     The risks, benefits, complications, treatment options, and expected outcomes were discussed with the patient. The risks and potential complications of their problem and purposed  treatment include but are not limited to infection, nerve injury, vascular injury, persistent pain, potential skin necrosis, deep vein thrombosis, possible pulmonary embolus,and complications of the anesthetics. The patient concurred with the proposed plan, giving informed consent. The site of surgery properly noted/marked.      Short-term goals include maintaining good offloading and minimizing bioburden, promoting granulation and epithelialization to healing. Long-term goals include keeping the wound healed by good offloading and medical management under the direction of internist.

## 2020-12-20 NOTE — DISCHARGE SUMMARY
[facility]  Discharge Summary      Admit Date: 12/18/2020    Discharge Date and Time: 12/18/2020 11:00 AM    Attending Physician: No att. providers found     Reason for Admission: Right foot debridement     Procedures Performed: Procedure(s) (LRB):  IRRIGATION AND DEBRIDEMENT (Right)  Biopsy- trocar biopsy (Right)    Hospital Course (synopsis of major diagnoses, care, treatment, and services provided during the course of the hospital stay): Patient was admitted for an inpatient procedure and tolerated the procedure well with no complications.      Consults: none     Significant Diagnostic Studies: none    Final Diagnoses:    Principal Problem: <principal problem not specified>   Secondary Diagnoses:   Active Hospital Problems    Diagnosis  POA    Type 2 diabetes mellitus with right diabetic foot ulcer [E11.621, L97.519]  Yes    DM type 2 with diabetic peripheral neuropathy [E11.42]  Yes     Chronic      Resolved Hospital Problems   No resolved problems to display.       Discharged Condition: good    Disposition: Home or Self Care    Follow Up/Patient Instructions:     Medications:  Reconciled Home Medications:      Medication List      START taking these medications    doxycycline 100 MG Cap  Commonly known as: VIBRAMYCIN  Take 1 capsule (100 mg total) by mouth 2 (two) times daily.     oxyCODONE-acetaminophen 5-325 mg per tablet  Commonly known as: PERCOCET  Take 1 tablet by mouth every 6 (six) hours as needed for Pain.        ASK your doctor about these medications    allopurinoL 300 MG tablet  Commonly known as: ZYLOPRIM  Take 1 tablet (300 mg total) by mouth once daily.     aspirin 81 MG EC tablet  Commonly known as: ECOTRIN  Take 81 mg by mouth once daily.     bisacodyL 5 mg EC tablet  Commonly known as: DULCOLAX  Take 5 mg by mouth daily as needed for Constipation.     brimonidine 0.2% 0.2 % Drop  Commonly known as: ALPHAGAN  Place 1 drop into both eyes 3 (three) times daily.     carvediloL  "3.125 MG tablet  Commonly known as: COREG  TAKE 1 TABLET(3.125 MG) BY MOUTH TWICE DAILY     coenzyme Q10 100 mg capsule  Take 100 mg by mouth every morning.     dorzolamide-timolol 2-0.5% 22.3-6.8 mg/mL ophthalmic solution  Commonly known as: COSOPT  Place 1 drop into both eyes 3 (three) times daily.     ezetimibe 10 mg tablet  Commonly known as: ZETIA  Take 1 tablet (10 mg total) by mouth once daily.     fish oil-omega-3 fatty acids 300-1,000 mg capsule  Take 1 capsule by mouth 2 (two) times daily.     gabapentin 100 MG capsule  Commonly known as: NEURONTIN  TAKE 2 CAPSULES(200 MG) BY MOUTH EVERY EVENING     HYDROcodone-acetaminophen 7.5-325 mg per tablet  Commonly known as: NORCO  Take 1 tablet by mouth every 8 (eight) hours as needed for Pain.     loratadine 10 mg tablet  Commonly known as: CLARITIN  Take 10 mg by mouth daily as needed for Allergies.     multivitamin Tab  Take 1 tablet by mouth every morning.     pen needle, diabetic 32 gauge x 5/32" Ndle  Commonly known as: BD ULTRA-FINE AMADOR PEN NEEDLE  1 Device by Misc.(Non-Drug; Combo Route) route 2 (two) times a day.     SantyL ointment  Generic drug: collagenase  Apply topically once daily.     torsemide 20 MG Tab  Commonly known as: DEMADEX  Take 4 tablets (80 mg total) by mouth 2 (two) times daily.     XULTOPHY 100/3.6 100 unit-3.6 mg /mL (3 mL) Inpn  Generic drug: insulin degludec-liraglutide  Inject 35 Units into the skin once daily.          Discharge Procedure Orders   Diet general       "

## 2020-12-20 NOTE — OP NOTE
[facility]  Operative Note      Date of Procedure: 12/18/2020     Procedure: Procedure(s) (LRB):  IRRIGATION AND DEBRIDEMENT (Right)  Biopsy- trocar biopsy (Right)     Surgeon(s) and Role:     * Brianna Champion DPM - Primary    Assisting Surgeon: None    Pre-Operative Diagnosis: Type 2 diabetes mellitus with right diabetic foot ulcer [E11.621, L97.519]    Post-Operative Diagnosis: Post-Op Diagnosis Codes:     * Type 2 diabetes mellitus with right diabetic foot ulcer [E11.621, L97.519]    Anesthesia: Local MAC+ 18cc of 1:1 mixture 0.5% marcaine plain 1% lidocaine plain       Description of the Findings of the Procedure:     The patient was brought to the operating room on a stretcher and placed on the operating table in a supine position. Following the successful induction of MAC anesthesia, a tourniquet was applied to the patients right ankle. Following this, a local anesthetic block consisting of aproximately 18cc of  1:1 mixture of 1% lidocaine plain + 0.5% marcaine plain was injected. Then, the right foot was scrubbed, prepped and draped in the usual aseptic manner. . A time out was performed. No tourniquet inflated for the duration of the procedure.     Next  a sterile #15 blade was used to excisionally debride viable and non viable tissue on the plantar aspect of the right foot  Foot ,medial ankle and right third toe.  Tissue debrided through epidermis, dermis, and subcutaneous tissue. Tissue excised included epidermis, dermis, sucutaneous tissue, fibrin, biofilm, callus and bone- right calcaneus.  Bone specimen obtained from left calcaneus sent for pathology and micro.   Right medial ankle.   Pre debridement measurements: 1.1cmx1.5cmx0.15cm  Post debridement measurements: 1.3cmx1.7cmx0.2cm  Right 3rd toe   Pre debridement measurements:0.2cmx0.4cmx0.2cm  Post debridement measurements: 0.4cmx0.5cmx0.3cm  Right calcaneus:  Pre debridement measurements: 10cmx9.4cmx1.6cm  Post debridement  measurements: 10.3cmx0.5cmx1.7cm   Next jamshidi needle used to obtain bone specimen right 3rd toe. Bone specimen sent for micro and pathology.     Next wounds copiously irrigated with 3L NS pulse lavage. Next neoxx graft applied to right calcaneus secured with 4-0 nylon multiple simple sutures.     The patient tolerated the procedure and anesthesia well. He was transferred to the recovery room with vital signs stable, vascular status intact, and capillary refill time < 3 seconds to the distal right foot. Following a period of post op monitoring, the patient will be discharged home on the following written and oral post op instructions:   1. Keep dressing dry and intact until clinic visit.  2. Avoid excessive ambulation, ambulate with surgical shoe on at all times.   3. Ice and elevate right foot when at rest.  4. ID referral placed.       Complications: No    Estimated Blood Loss (EBL): <10ml           Implants:   Implant Name Type Inv. Item Serial No.  Lot No. LRB No. Used Action   [id number]  ALLOGRAFT NEOX CORD RT 4X3CM [id number] [employer]  Right 1 Implanted   [id number]  MEMBRANE NEOX 1K 6.0X3.0CM [id number] [employer]  Right 1 Implanted   [id number]  ALLOGRAFT NEOX CORD RT 2X2CM [id number] [employer]  Right 1 Implanted   [id number]  ALLOGRAFT NEOX CORD RT 6X3CM [id number] [employer]  Right 1 Implanted       Specimens:   Specimen (12h ago, onward)    None                  Condition: Good    Disposition: PACU - hemodynamically stable.    Attestation: I was present and scrubbed for the entire procedure.    Discharge Note    OUTCOME: Patient tolerated treatment/procedure well without complication and is now ready for discharge.    DISPOSITION: Home or Self Care    FINAL DIAGNOSIS:  <principal problem not specified>    FOLLOWUP: In clinic    DISCHARGE INSTRUCTIONS:    Discharge Procedure Orders   Diet general

## 2020-12-21 LAB
BACTERIA SPEC AEROBE CULT: ABNORMAL
FINAL PATHOLOGIC DIAGNOSIS: NORMAL
GROSS: NORMAL
Lab: NORMAL

## 2020-12-22 ENCOUNTER — HOSPITAL ENCOUNTER (OUTPATIENT)
Dept: WOUND CARE | Facility: HOSPITAL | Age: 60
Discharge: HOME | End: 2020-12-22
Attending: FAMILY MEDICINE
Payer: MEDICAID

## 2020-12-22 VITALS
SYSTOLIC BLOOD PRESSURE: 118 MMHG | DIASTOLIC BLOOD PRESSURE: 64 MMHG | RESPIRATION RATE: 20 BRPM | HEART RATE: 88 BPM | TEMPERATURE: 98 F

## 2020-12-22 DIAGNOSIS — R60.9 VENOUS STASIS ULCER WITH EDEMA OF LOWER LEG: ICD-10-CM

## 2020-12-22 DIAGNOSIS — L97.909 VENOUS STASIS ULCER WITH EDEMA OF LOWER LEG: ICD-10-CM

## 2020-12-22 DIAGNOSIS — I83.899 VENOUS STASIS ULCER WITH EDEMA OF LOWER LEG: ICD-10-CM

## 2020-12-22 DIAGNOSIS — I70.234 ATHEROSCLEROSIS OF NATIVE ARTERY OF RIGHT LOWER EXTREMITY WITH ULCERATION OF HEEL: Primary | ICD-10-CM

## 2020-12-22 DIAGNOSIS — I83.009 VENOUS STASIS ULCER WITH EDEMA OF LOWER LEG: ICD-10-CM

## 2020-12-22 LAB
BACTERIA SPEC AEROBE CULT: NO GROWTH
BACTERIA SPEC ANAEROBE CULT: NORMAL
BACTERIA SPEC ANAEROBE CULT: NORMAL

## 2020-12-22 PROCEDURE — 99214 OFFICE O/P EST MOD 30 MIN: CPT | Performed by: FAMILY MEDICINE

## 2020-12-22 PROCEDURE — 99214 PR OFFICE/OUTPT VISIT, EST, LEVL IV, 30-39 MIN: ICD-10-PCS | Mod: ,,, | Performed by: FAMILY MEDICINE

## 2020-12-22 PROCEDURE — 99214 OFFICE O/P EST MOD 30 MIN: CPT | Mod: ,,, | Performed by: FAMILY MEDICINE

## 2020-12-22 NOTE — PROGRESS NOTES
[facility]                Progress Note    Subjective:       Patient ID: Marvin Ray is a 60 y.o. male.    Chief Complaint: Wound Check    F/u wound care visit. Patient in w/c to exam room, transfers with assistance x1. Wound dressing to Left foot intact with moderate amount of serosanguineous drainage noted. Wounds smaller and improving. Wound dressing to RLE intact with small amount of serosanguineous drainage noted to right leg, toe, and ankle. Large amount of serosanguineous noted to right heel wound, neox intact with sutures to outer area of wound, wound bed black. His sister states that the wounds have not been draining as much especially on right foot.  No pain today.  His last paracentesis was just prior to wound debridement.  Per [employer] rep, they will be supplying the remainder of graft not given for surgery free of charge..       Review of Systems   Constitutional: Negative.    HENT: Negative.    Eyes: Negative.    Respiratory: Negative.    Cardiovascular: Positive for leg swelling.   Gastrointestinal: Positive for abdominal distention. Negative for abdominal pain, anal bleeding, blood in stool and constipation.   Musculoskeletal: Negative.    Skin: Positive for wound.   Neurological: Positive for numbness.   Psychiatric/Behavioral: Negative.          Objective:        Physical Exam  Vitals signs reviewed.   Constitutional:       Appearance: Normal appearance.   HENT:      Head: Normocephalic and atraumatic.      Mouth/Throat:      Mouth: Mucous membranes are moist.      Pharynx: Oropharynx is clear.   Eyes:      Extraocular Movements: Extraocular movements intact.      Conjunctiva/sclera: Conjunctivae normal.      Pupils: Pupils are equal, round, and reactive to light.   Cardiovascular:      Rate and Rhythm: Normal rate and regular rhythm.   Pulmonary:      Effort: Pulmonary effort is normal. No respiratory distress.      Breath sounds: No wheezing.    Abdominal:      General: There is distension.      Palpations: Abdomen is soft.      Tenderness: There is no abdominal tenderness.   Skin:     General: Skin is warm and dry.      Comments: +bilateral lower extremity veinous ulcers.  Neox graft in place in right heel.  Wound to to right anterior tib/fib clean as well the wound on medial foot.  Left foot wounds stable   Neurological:      Mental Status: He is alert and oriented to person, place, and time.      Sensory: Sensory deficit present.         Vitals:    12/22/20 1550   BP: 118/64   Pulse: 88   Resp: 20   Temp: 97.7 °F (36.5 °C)       Assessment:           ICD-10-CM ICD-9-CM   1. Atherosclerosis of native artery of right lower extremity with ulceration of heel  I70.234 440.23     707.14   2. Venous stasis ulcer with edema of lower leg  I83.009 454.0    I83.899 782.3    L97.909     R60.9             Wound 08/11/20 1051 Diabetic Ulcer Right medial Malleolus/Ankle (Active)   08/11/20 1051    Pre-existing: No   Primary Wound Type: Diabetic ulc   Side: Right   Orientation: medial   Location: Malleolus/Ankle   Wound Number (optional):    Ankle-Brachial Index:    Pulses:    Removal Indication and Assessment:    Wound Outcome:    (Retired) Wound Type:    (Retired) Wound Length (cm):    (Retired) Wound Width (cm):    (Retired) Depth (cm):    Wound Description (Comments):    Removal Indications:    Wound Image   12/22/20 1600   Wound WDL ex 12/22/20 1600   Dressing Appearance Intact;Moist drainage 12/22/20 1600   Drainage Amount Small 12/22/20 1600   Drainage Characteristics/Odor Serosanguineous 12/22/20 1600   Appearance Pink;Red 12/22/20 1600   Tissue loss description Partial thickness 12/22/20 1600   Black (%), Wound Tissue Color 0 % 12/22/20 1600   Red (%), Wound Tissue Color 100 % 12/22/20 1600   Yellow (%), Wound Tissue Color 0 % 12/22/20 1600   Periwound Area Intact;Redness 12/22/20 1600   Wound Edges Open 12/22/20 1600   Wound Length (cm) 0.9 cm 12/22/20 1600    Wound Width (cm) 0.9 cm 12/22/20 1600   Wound Depth (cm) 0.1 cm 12/22/20 1600   Wound Volume (cm^3) 0.08 cm^3 12/22/20 1600   Wound Surface Area (cm^2) 0.81 cm^2 12/22/20 1600   Care Cleansed with:;Soap and water;Sterile normal saline 12/22/20 1600   Dressing Applied 12/22/20 1600            Wound 12/11/20 0931 Venous Ulcer Right lower Leg (Active)   12/11/20 0931    Pre-existing: Yes   Primary Wound Type: Venous ulcer   Side: Right   Orientation: lower   Location: Leg   Wound Number (optional):    Ankle-Brachial Index:    Pulses:    Removal Indication and Assessment:    Wound Outcome:    (Retired) Wound Type:    (Retired) Wound Length (cm):    (Retired) Wound Width (cm):    (Retired) Depth (cm):    Wound Description (Comments):    Removal Indications:    Wound Image   12/22/20 1600   Wound WDL ex 12/22/20 1600   Dressing Appearance Intact;Moist drainage 12/22/20 1600   Drainage Amount Small 12/22/20 1600   Drainage Characteristics/Odor Serous 12/22/20 1600   Appearance Red 12/22/20 1600   Tissue loss description Partial thickness 12/22/20 1600   Black (%), Wound Tissue Color 0 % 12/22/20 1600   Red (%), Wound Tissue Color 100 % 12/22/20 1600   Yellow (%), Wound Tissue Color 0 % 12/22/20 1600   Periwound Area Redness 12/22/20 1600   Wound Edges Defined 12/22/20 1600   Wound Length (cm) 2.3 cm 12/22/20 1600   Wound Width (cm) 2 cm 12/22/20 1600   Wound Depth (cm) 0.1 cm 12/22/20 1600   Wound Volume (cm^3) 0.46 cm^3 12/22/20 1600   Wound Surface Area (cm^2) 4.6 cm^2 12/22/20 1600   Care Cleansed with:;Sterile normal saline;Soap and water 12/22/20 1600   Dressing Applied 12/22/20 1600            Wound 12/22/20 1608 Diabetic Ulcer Left distal Foot (Active)   12/22/20 1608    Pre-existing:    Primary Wound Type: Diabetic ulc   Side: Left   Orientation: distal   Location: Foot   Wound Number (optional):    Ankle-Brachial Index:    Pulses:    Removal Indication and Assessment:    Wound Outcome:    (Retired) Wound Type:     (Retired) Wound Length (cm):    (Retired) Wound Width (cm):    (Retired) Depth (cm):    Wound Description (Comments):    Removal Indications:    Wound Image   12/22/20 1600   Wound WDL ex 12/22/20 1600   Dressing Appearance Intact;Moist drainage 12/22/20 1600   Drainage Amount Moderate 12/22/20 1600   Drainage Characteristics/Odor Serosanguineous;Yellow 12/22/20 1600   Appearance Red;Pink;Yellow;Green 12/22/20 1600   Tissue loss description Full thickness 12/22/20 1600   Black (%), Wound Tissue Color 0 % 12/22/20 1600   Red (%), Wound Tissue Color 90 % 12/22/20 1600   Yellow (%), Wound Tissue Color 10 % 12/22/20 1600   Periwound Area Scar tissue 12/22/20 1600   Wound Edges Irregular 12/22/20 1600   Wound Length (cm) 2.2 cm 12/22/20 1600   Wound Width (cm) 5 cm 12/22/20 1600   Wound Depth (cm) 0.2 cm 12/22/20 1600   Wound Volume (cm^3) 2.2 cm^3 12/22/20 1600   Wound Surface Area (cm^2) 11 cm^2 12/22/20 1600   Care Cleansed with:;Soap and water;Sterile normal saline 12/22/20 1600   Dressing Applied 12/22/20 1600            Wound 12/22/20 1609 Diabetic Ulcer Left medial Foot (Active)   12/22/20 1609    Pre-existing:    Primary Wound Type: Diabetic ulc   Side: Left   Orientation: medial   Location: Foot   Wound Number (optional):    Ankle-Brachial Index:    Pulses:    Removal Indication and Assessment:    Wound Outcome:    (Retired) Wound Type:    (Retired) Wound Length (cm):    (Retired) Wound Width (cm):    (Retired) Depth (cm):    Wound Description (Comments):    Removal Indications:    Wound Image   12/22/20 1600   Wound WDL ex 12/22/20 1600   Dressing Appearance Intact;Moist drainage 12/22/20 1600   Drainage Amount Moderate 12/22/20 1600   Drainage Characteristics/Odor Serosanguineous 12/22/20 1600   Appearance Pink;Red;Yellow;Green 12/22/20 1600   Tissue loss description Full thickness 12/22/20 1600   Black (%), Wound Tissue Color 0 % 12/22/20 1600   Red (%), Wound Tissue Color 80 % 12/22/20 1600   Yellow (%),  Wound Tissue Color 20 % 12/22/20 1600   Periwound Area Scar tissue 12/22/20 1600   Wound Edges Open 12/22/20 1600   Wound Length (cm) 2.3 cm 12/22/20 1600   Wound Width (cm) 5 cm 12/22/20 1600   Wound Depth (cm) 0.2 cm 12/22/20 1600   Wound Volume (cm^3) 2.3 cm^3 12/22/20 1600   Wound Surface Area (cm^2) 11.5 cm^2 12/22/20 1600   Care Cleansed with:;Soap and water;Sterile normal saline 12/22/20 1600   Dressing Applied 12/22/20 1600            Incision/Site 06/16/20 0930 Right Toe, third anterior (Active)   06/16/20 0930   Present Prior to Hospital Arrival?: Yes   Side: Right   Location: Toe, third   Orientation: anterior   Incision Type:    Closure Method:    Additional Comments:    Removal Indication and Assessment:    Wound Outcome:    Removal Indications:    Wound Image   12/22/20 1600   Incision WDL ex 12/22/20 1600   Dressing Appearance Intact;Moist drainage 12/22/20 1600   Drainage Amount Small 12/22/20 1600   Drainage Characteristics/Odor Serosanguineous 12/22/20 1600   Appearance Red 12/22/20 1600   Black (%), Wound Tissue Color 0 % 12/22/20 1600   Red (%), Wound Tissue Color 100 % 12/22/20 1600   Yellow (%), Wound Tissue Color 0 % 12/22/20 1600   Periwound Area Intact 12/22/20 1600   Wound Edges Defined 12/22/20 1600   Wound Length (cm) 0 cm 12/22/20 1600   Wound Width (cm) 20.3 cm 12/22/20 1600   Wound Depth (cm) 0.1 cm 12/22/20 1600   Wound Volume (cm^3) 0 cm^3 12/22/20 1600   Wound Surface Area (cm^2) 0 cm^2 12/22/20 1600   Care Cleansed with:;Soap and water;Sterile normal saline 12/22/20 1600   Dressing Applied 12/22/20 1600            Incision/Site 11/18/20 1458 Right Heel (Active)   11/18/20 1458   Present Prior to Hospital Arrival?:    Side: Right   Location: Heel   Orientation:    Incision Type:    Closure Method:    Additional Comments:    Removal Indication and Assessment:    Wound Outcome:    Removal Indications:    Wound Image    12/22/20 1600   Incision WDL ex 12/22/20 1600   Dressing  Appearance Intact;Moist drainage 12/22/20 1600   Drainage Amount Large 12/22/20 1600   Drainage Characteristics/Odor Serosanguineous 12/22/20 1600   Appearance Black;[place] 12/22/20 1600   Wound Length (cm) 9.5 cm 12/22/20 1600   Wound Width (cm) 8.5 cm 12/22/20 1600   Wound Surface Area (cm^2) 80.75 cm^2 12/22/20 1600   Dressing Applied 12/22/20 1600           Plan:                Orders Placed This Encounter   Procedures    Change dressing     Right Malleolus, Right Shin, Left Medial Foot, Left Distal Foot: clean with saline. Apply antibiotic powder and Endoform to wound. Cover Right malleolus and Right shin with foam border and Left foot with ABD, kerlix, stockinet. Change daily per sister.   Right Heel: Apply adaptic touch to wound bed, cover with ABD. Right 3rd toe: clean with saline, antibiotic powder to wound daily per sister. Cover with cast padding/stockient.        Follow up in about 1 week (around 12/29/2020) for wound care.     Rubén Davis MD

## 2020-12-23 LAB
ACID FAST MOD KINY STN SPEC: NORMAL
MYCOBACTERIUM SPEC QL CULT: NORMAL

## 2020-12-28 ENCOUNTER — OFFICE VISIT (OUTPATIENT)
Dept: INFECTIOUS DISEASES | Facility: CLINIC | Age: 60
End: 2020-12-28
Payer: MEDICAID

## 2020-12-28 VITALS
HEIGHT: 70 IN | DIASTOLIC BLOOD PRESSURE: 70 MMHG | SYSTOLIC BLOOD PRESSURE: 127 MMHG | TEMPERATURE: 98 F | BODY MASS INDEX: 29.42 KG/M2 | HEART RATE: 88 BPM | WEIGHT: 205.5 LBS | RESPIRATION RATE: 20 BRPM

## 2020-12-28 DIAGNOSIS — L08.9 WOUND INFECTION: Primary | ICD-10-CM

## 2020-12-28 DIAGNOSIS — M86.271 SUBACUTE OSTEOMYELITIS OF RIGHT FOOT: ICD-10-CM

## 2020-12-28 DIAGNOSIS — T14.8XXA WOUND INFECTION: Primary | ICD-10-CM

## 2020-12-28 PROCEDURE — 99999 PR PBB SHADOW E&M-EST. PATIENT-LVL III: CPT | Mod: PBBFAC,,, | Performed by: INTERNAL MEDICINE

## 2020-12-28 PROCEDURE — 99214 PR OFFICE/OUTPT VISIT, EST, LEVL IV, 30-39 MIN: ICD-10-PCS | Mod: S$PBB,,, | Performed by: INTERNAL MEDICINE

## 2020-12-28 PROCEDURE — 99999 PR PBB SHADOW E&M-EST. PATIENT-LVL III: ICD-10-PCS | Mod: PBBFAC,,, | Performed by: INTERNAL MEDICINE

## 2020-12-28 PROCEDURE — 99213 OFFICE O/P EST LOW 20 MIN: CPT | Mod: PBBFAC | Performed by: INTERNAL MEDICINE

## 2020-12-28 PROCEDURE — 99214 OFFICE O/P EST MOD 30 MIN: CPT | Mod: S$PBB,,, | Performed by: INTERNAL MEDICINE

## 2020-12-28 RX ORDER — FLUCONAZOLE 200 MG/1
400 TABLET ORAL DAILY
Qty: 28 TABLET | Refills: 0 | Status: SHIPPED | OUTPATIENT
Start: 2020-12-28 | End: 2021-01-11

## 2020-12-28 NOTE — ANESTHESIA POSTPROCEDURE EVALUATION
Anesthesia Post Evaluation    Patient: Marvin Ray    Procedure(s) Performed: Procedure(s) (LRB):  IRRIGATION AND DEBRIDEMENT (Right)  Biopsy- trocar biopsy (Right)    Final Anesthesia Type: MAC      Patient location during evaluation: [facility]  Patient participation: Yes- Able to Participate  Level of consciousness: awake and alert  Post-procedure vital signs: reviewed and stable  Pain management: adequate  Airway patency: patent    PONV status at discharge: No PONV  Anesthetic complications: no      Cardiovascular status: blood pressure returned to baseline and hemodynamically stable  Respiratory status: unassisted and spontaneous ventilation  Hydration status: euvolemic  Follow-up not needed.          No case tracking events are documented in the log.      Pain/Ralf Score: No data recorded

## 2020-12-28 NOTE — PROGRESS NOTES
Subjective:      Patient ID: Marvin Ray is a 60 y.o. male.    Chief Complaint:No chief complaint on file.      History of Present Illness    59 y/o with a history of DM type 2, CAD with CHF s/p AICD placement, ascites with regularly scheduled paracentesis.  He also has a history of diabetic foot ulcer and osteomyelitis.  He recently completed 2 weeks of IV meropenem for a right foot heal wound.  Cultures from the wound were positive for Pseudomonas, Klebsiella, E faecalis and Group B strep. In the interim, his wounds were felt to not be progressing as they should.  Repeat cultures were obtained on 12/18/20 from the right calcaneus and these were negative.  A culture was also obtained from the right foot 3rd toe.  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  He is referred back to my clinic for evaluation.     Review of Systems   Constitution: Negative for chills, decreased appetite, fever, malaise/fatigue, night sweats, weight gain and weight loss.   HENT: Negative for congestion, ear pain, hearing loss, hoarse voice, sore throat and tinnitus.    Eyes: Negative for blurred vision, redness and visual disturbance.   Cardiovascular: Negative for chest pain, leg swelling and palpitations.   Respiratory: Negative for cough, hemoptysis, shortness of breath and sputum production.    Hematologic/Lymphatic: Negative for adenopathy. Does not bruise/bleed easily.   Skin: Positive for poor wound healing. Negative for dry skin, itching, rash and suspicious lesions.   Musculoskeletal: Negative for back pain, joint pain, myalgias and neck pain.   Gastrointestinal: Negative for abdominal pain, constipation, diarrhea, heartburn, nausea and vomiting.   Genitourinary: Negative for dysuria, flank pain, frequency, hematuria, hesitancy and urgency.   Neurological: Negative for dizziness, headaches, numbness, paresthesias and weakness.   Psychiatric/Behavioral: Negative for depression and  memory loss. The patient does not have insomnia and is not nervous/anxious.      Objective:   Physical Exam  Vitals signs and nursing note reviewed.   Constitutional:       General: He is not in acute distress.     Appearance: He is well-developed. He is not diaphoretic.   HENT:      Head: Normocephalic and atraumatic.      Right Ear: External ear normal.      Left Ear: External ear normal.      Nose: Nose normal.      Mouth/Throat:      Pharynx: No oropharyngeal exudate.   Eyes:      General: No scleral icterus.        Right eye: No discharge.         Left eye: No discharge.      Conjunctiva/sclera: Conjunctivae normal.      Pupils: Pupils are equal, round, and reactive to light.   Neck:      Musculoskeletal: Normal range of motion and neck supple.      Thyroid: No thyromegaly.      Vascular: No JVD.      Trachea: No tracheal deviation.   Cardiovascular:      Rate and Rhythm: Normal rate and regular rhythm.      Heart sounds: No murmur. No friction rub. No gallop.    Pulmonary:      Effort: Pulmonary effort is normal. No respiratory distress.      Breath sounds: Normal breath sounds. No stridor. No wheezing or rales.   Chest:      Chest wall: No tenderness.   Abdominal:      General: Bowel sounds are normal. There is no distension.      Palpations: Abdomen is soft. There is no mass.      Tenderness: There is no abdominal tenderness. There is no guarding or rebound.   Musculoskeletal: Normal range of motion.         General: No tenderness.   Lymphadenopathy:      Cervical: No cervical adenopathy.   Skin:     General: Skin is warm.      Coloration: Skin is not pale.      Findings: No erythema or rash.      Comments: Right foot covered in dressings.  Pictures reviewed.   Neurological:      Mental Status: He is alert and oriented to person, place, and time.      Cranial Nerves: No cranial nerve deficit.      Motor: No abnormal muscle tone.      Coordination: Coordination normal.      Deep Tendon Reflexes: Reflexes are  normal and symmetric. Reflexes normal.   Psychiatric:         Behavior: Behavior normal.         Thought Content: Thought content normal.         Judgment: Judgment normal.       Assessment:       1. Wound infection    2. Subacute osteomyelitis of right foot        61 y/o with a history of DM type 2, CAD with CHF s/p AICD placement, ascites with regularly scheduled paracentesis.  He also has a history of diabetic foot ulcer and osteomyelitis.  He recently completed 2 weeks of IV meropenem for a right foot heal wound.  Cultures from the wound were positive for Pseudomonas, Klebsiella, E faecalis and Group B strep. In the interim, his wounds were felt to not be progressing as they should.  Repeat cultures were obtained on 12/18/20 from the right calcaneus and these were negative.  A culture was also obtained from the right foot 3rd toe.  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  He is referred back to my clinic for evaluation.     Will plan to treat the patient with fluconazole for the Candida parapsillosis bone infection.  Anticipate 6 weeks of therapy.  Given his ascites and possible liver disease, will start with 2 weeks and see how he tolerates it.      Consider referral to hepatology to determine the cause of his ascites.  Plan:       Wound infection  -     fluconazole (DIFLUCAN) 200 MG Tab; Take 2 tablets (400 mg total) by mouth once daily. for 14 days  Dispense: 28 tablet; Refill: 0    Subacute osteomyelitis of right foot

## 2020-12-29 ENCOUNTER — HOSPITAL ENCOUNTER (OUTPATIENT)
Dept: WOUND CARE | Facility: HOSPITAL | Age: 60
Discharge: HOME | End: 2020-12-29
Attending: FAMILY MEDICINE
Payer: MEDICAID

## 2020-12-29 DIAGNOSIS — I83.899 VENOUS STASIS ULCER WITH EDEMA OF LOWER LEG: ICD-10-CM

## 2020-12-29 DIAGNOSIS — L97.516 DIABETIC ULCER OF TOE OF RIGHT FOOT ASSOCIATED WITH TYPE 2 DIABETES MELLITUS, WITH BONE INVOLVEMENT WITHOUT EVIDENCE OF NECROSIS: ICD-10-CM

## 2020-12-29 DIAGNOSIS — E11.621 TYPE 2 DIABETES MELLITUS WITH RIGHT DIABETIC FOOT ULCER: ICD-10-CM

## 2020-12-29 DIAGNOSIS — E11.621 DIABETIC ULCER OF TOE OF RIGHT FOOT ASSOCIATED WITH TYPE 2 DIABETES MELLITUS, WITH BONE INVOLVEMENT WITHOUT EVIDENCE OF NECROSIS: ICD-10-CM

## 2020-12-29 DIAGNOSIS — L97.909 VENOUS STASIS ULCER WITH EDEMA OF LOWER LEG: ICD-10-CM

## 2020-12-29 DIAGNOSIS — I70.244: Primary | ICD-10-CM

## 2020-12-29 DIAGNOSIS — L97.519 TYPE 2 DIABETES MELLITUS WITH RIGHT DIABETIC FOOT ULCER: ICD-10-CM

## 2020-12-29 DIAGNOSIS — R60.9 VENOUS STASIS ULCER WITH EDEMA OF LOWER LEG: ICD-10-CM

## 2020-12-29 DIAGNOSIS — I83.009 VENOUS STASIS ULCER WITH EDEMA OF LOWER LEG: ICD-10-CM

## 2020-12-29 PROCEDURE — 15275 SKIN SUB GRAFT FACE/NK/HF/G: CPT | Performed by: FAMILY MEDICINE

## 2020-12-29 PROCEDURE — 15271 PR SKIN SUB GRAFT TRNK/ARM/LEG: ICD-10-PCS | Mod: ,,, | Performed by: FAMILY MEDICINE

## 2020-12-29 PROCEDURE — 99214 OFFICE O/P EST MOD 30 MIN: CPT | Mod: 25,,, | Performed by: FAMILY MEDICINE

## 2020-12-29 PROCEDURE — 99214 PR OFFICE/OUTPT VISIT, EST, LEVL IV, 30-39 MIN: ICD-10-PCS | Mod: 25,,, | Performed by: FAMILY MEDICINE

## 2020-12-29 PROCEDURE — 15271 SKIN SUB GRAFT TRNK/ARM/LEG: CPT | Mod: ,,, | Performed by: FAMILY MEDICINE

## 2020-12-29 NOTE — PROGRESS NOTES
[facility]                Progress Note    Subjective:       Patient ID: Marvin Ray is a 60 y.o. male.    Chief Complaint: No chief complaint on file.    Patient transferred from wheelchair to exam chair without assistance. Bilateral foot dressings with moderate amount of drainage. Patient's sister continuing to put antibiotic blend to Left Foot and wound bed appears to have improved granulation. Right Heel had Neox placed today over bone/tendon area at top portion of wound towards midfoot. Continuing contact layer/steristrips and gauze/ABD changes with wetness. Right Anterior Lower Leg Venous Ulcer almost healed. Right Medial Malleolus slowly improving. New wound to Right Medial 5th Toe. Right 3rd Toe wound stable. Patient to return to Podiatry next week.       Review of Systems   Constitutional: Negative.    HENT: Negative.    Eyes: Negative.    Respiratory: Negative.    Cardiovascular: Negative.    Gastrointestinal: Negative.    Musculoskeletal: Negative.    Skin: Positive for wound.   Neurological: Positive for numbness.   Psychiatric/Behavioral: Negative.          Objective:        Physical Exam  Constitutional:       Appearance: Normal appearance.   HENT:      Head: Normocephalic and atraumatic.      Mouth/Throat:      Mouth: Mucous membranes are moist.      Pharynx: Oropharynx is clear.   Eyes:      Extraocular Movements: Extraocular movements intact.      Conjunctiva/sclera: Conjunctivae normal.      Pupils: Pupils are equal, round, and reactive to light.   Neck:      Musculoskeletal: Normal range of motion and neck supple.   Cardiovascular:      Rate and Rhythm: Normal rate and regular rhythm.   Pulmonary:      Effort: Pulmonary effort is normal. No respiratory distress.      Breath sounds: No wheezing.   Abdominal:      General: There is distension.      Palpations: Abdomen is soft.      Tenderness: There is no abdominal tenderness.   Skin:      General: Skin is warm and dry.      Comments: + bilateral DFU's with right foot requiring application of neox due to surgical debridement; left DFU stable with no acute changes   Neurological:      Mental Status: He is alert and oriented to person, place, and time.      Sensory: Sensory deficit present.         There were no vitals filed for this visit.    Patient Name: Marvin Ray  Patient MRN: [id number]  Patient YOB: 1960  CSN: [id number]  Date: 12/29/2020  Provider:   Rubén Davis    Application for Assessment: skin substitute  Skin Substitute: ...  Performed By: Rubén Davis  Time-out Taken: Yes  Location:     [] Genitalia/Hands/Feet/Multiple Digits    [] Scalp/Face/Neck/Ears    []Trunk/Arms/Legs  Application Area: (sq cm): 18   Product Applied: (sq cm): 18  Product Waste (sq cm): 0  Fenestrated: Yes   Instrument:    [x] Blade [] Curette  []Forceps  []Nippers    [] Rongeur [] Scissors  []Others:   Secured: yes   Secured with: Sutures  Dressing Applied: Yes  Response to Treatment:Well tolerated by patient.  Note:       2  Assessment:           ICD-10-CM ICD-9-CM   1. Atherosclerosis of left lower extremity with ulceration of midfoot  I70.244 440.23     707.14   2. Diabetic ulcer of toe of right foot associated with type 2 diabetes mellitus, with bone involvement without evidence of necrosis  E11.621 250.80    L97.516 707.15   3. Venous stasis ulcer with edema of lower leg  I83.009 454.0    I83.899 782.3    L97.909     R60.9    4. Type 2 diabetes mellitus with right diabetic foot ulcer  E11.621 250.80    L97.519 707.15            Wound 08/11/20 1051 Diabetic Ulcer Right medial Malleolus/Ankle (Active)   08/11/20 1051    Pre-existing: No   Primary Wound Type: Diabetic ulc   Side: Right   Orientation: medial   Location: Malleolus/Ankle   Wound Number (optional):    Ankle-Brachial Index:    Pulses:    Removal Indication and Assessment:    Wound Outcome:    (Retired) Wound Type:    (Retired)  Wound Length (cm):    (Retired) Wound Width (cm):    (Retired) Depth (cm):    Wound Description (Comments):    Removal Indications:    Wound Image   12/29/20 1700   Wound WDL ex 12/29/20 1700   Dressing Appearance Moist drainage 12/29/20 1700   Drainage Amount Small 12/29/20 1700   Drainage Characteristics/Odor Serosanguineous 12/29/20 1700   Appearance Pink;Red;Epithelialization 12/29/20 1700   Tissue loss description Partial thickness 12/29/20 1700   Red (%), Wound Tissue Color 100 % 12/29/20 1700   Periwound Area Swelling 12/29/20 1700   Wound Edges Open 12/29/20 1700   Wound Length (cm) 1 cm 12/29/20 1700   Wound Width (cm) 0.7 cm 12/29/20 1700   Wound Depth (cm) 0.1 cm 12/29/20 1700   Wound Volume (cm^3) 0.07 cm^3 12/29/20 1700   Wound Surface Area (cm^2) 0.7 cm^2 12/29/20 1700   Care Cleansed with:;Antimicrobial agent;Soap and water 12/29/20 1700   Dressing Changed 12/29/20 1700   Periwound Care Skin barrier film applied 12/29/20 1700            Wound 12/11/20 0931 Venous Ulcer Right lower Leg (Active)   12/11/20 0931    Pre-existing: Yes   Primary Wound Type: Venous ulcer   Side: Right   Orientation: lower   Location: Leg   Wound Number (optional):    Ankle-Brachial Index:    Pulses:    Removal Indication and Assessment:    Wound Outcome:    (Retired) Wound Type:    (Retired) Wound Length (cm):    (Retired) Wound Width (cm):    (Retired) Depth (cm):    Wound Description (Comments):    Removal Indications:    Wound Image   12/29/20 1700   Wound WDL ex 12/29/20 1700   Dressing Appearance Dried drainage 12/29/20 1700   Drainage Amount Scant 12/29/20 1700   Drainage Characteristics/Odor Serous 12/29/20 1700   Appearance Epithelialization;Red 12/29/20 1700   Tissue loss description Partial thickness 12/29/20 1700   Red (%), Wound Tissue Color 100 % 12/29/20 1700   Periwound Area [place] 12/29/20 1700   Wound Edges Open 12/29/20 1700   Wound Length (cm) 1.7 cm 12/29/20 1700   Wound Width (cm) 1.1 cm 12/29/20 1700    Wound Depth (cm) 0.1 cm 12/29/20 1700   Wound Volume (cm^3) 0.19 cm^3 12/29/20 1700   Wound Surface Area (cm^2) 1.87 cm^2 12/29/20 1700   Care Cleansed with:;Soap and water;Sterile normal saline 12/29/20 1700   Dressing Changed 12/29/20 1700   Periwound Care Moisture barrier applied 12/29/20 1700            Wound 12/22/20 1608 Diabetic Ulcer Left distal Foot (Active)   12/22/20 1608    Pre-existing:    Primary Wound Type: Diabetic ulc   Side: Left   Orientation: distal   Location: Foot   Wound Number (optional):    Ankle-Brachial Index:    Pulses:    Removal Indication and Assessment:    Wound Outcome:    (Retired) Wound Type:    (Retired) Wound Length (cm):    (Retired) Wound Width (cm):    (Retired) Depth (cm):    Wound Description (Comments):    Removal Indications:    Wound Image   12/29/20 1700   Wound WDL ex 12/29/20 1700   Dressing Appearance Moist drainage 12/29/20 1700   Drainage Amount Moderate 12/29/20 1700   Drainage Characteristics/Odor Serosanguineous 12/29/20 1700   Appearance Red;Granulating 12/29/20 1700   Tissue loss description Full thickness 12/29/20 1700   Red (%), Wound Tissue Color 100 % 12/29/20 1700   Periwound Area Scar tissue 12/29/20 1700   Wound Edges Open;Irregular 12/29/20 1700   Wound Length (cm) 2.3 cm 12/29/20 1700   Wound Width (cm) 6.8 cm 12/29/20 1700   Wound Depth (cm) 0.15 cm 12/29/20 1700   Wound Volume (cm^3) 2.35 cm^3 12/29/20 1700   Wound Surface Area (cm^2) 15.64 cm^2 12/29/20 1700   Care Cleansed with:;Soap and water;Sterile normal saline;Antimicrobial agent 12/29/20 1700   Dressing Changed 12/29/20 1700   Periwound Care Moisture barrier applied 12/29/20 1700   Off Loading Football dressing 12/29/20 1700            Wound 12/22/20 1609 Diabetic Ulcer Left medial Foot (Active)   12/22/20 1609    Pre-existing:    Primary Wound Type: Diabetic ulc   Side: Left   Orientation: medial   Location: Foot   Wound Number (optional):    Ankle-Brachial Index:    Pulses:    Removal  Indication and Assessment:    Wound Outcome:    (Retired) Wound Type:    (Retired) Wound Length (cm):    (Retired) Wound Width (cm):    (Retired) Depth (cm):    Wound Description (Comments):    Removal Indications:    Wound Image   12/29/20 1700   Wound WDL ex 12/29/20 1700   Dressing Appearance Moist drainage 12/29/20 1700   Drainage Amount Moderate 12/29/20 1700   Drainage Characteristics/Odor Serosanguineous 12/29/20 1700   Appearance Red;Granulating 12/29/20 1700   Tissue loss description Full thickness 12/29/20 1700   Red (%), Wound Tissue Color 100 % 12/29/20 1700   Periwound Area Scar tissue 12/29/20 1700   Wound Edges Open;Defined 12/29/20 1700   Wound Length (cm) 6.2 cm 12/29/20 1700   Wound Width (cm) 6.2 cm 12/29/20 1700   Wound Depth (cm) 0.2 cm 12/29/20 1700   Wound Volume (cm^3) 7.69 cm^3 12/29/20 1700   Wound Surface Area (cm^2) 38.44 cm^2 12/29/20 1700   Care Cleansed with:;Antimicrobial agent;Soap and water;Sterile normal saline 12/29/20 1700   Dressing Changed 12/29/20 1700   Off Loading Football dressing 12/29/20 1700            Incision/Site 06/16/20 0930 Right Toe, third anterior (Active)   06/16/20 0930   Present Prior to Hospital Arrival?: Yes   Side: Right   Location: Toe, third   Orientation: anterior   Incision Type:    Closure Method:    Additional Comments:    Removal Indication and Assessment:    Wound Outcome:    Removal Indications:    Wound Image   12/29/20 1700   Incision WDL ex 12/29/20 1700   Dressing Appearance Dry 12/29/20 1700   Drainage Amount None 12/29/20 1700   Appearance Other (see comments) 12/29/20 1700   Periwound Area Swelling 12/29/20 1700   Wound Edges Defined 12/29/20 1700   Wound Length (cm) 0.3 cm 12/29/20 1700   Wound Width (cm) 0.5 cm 12/29/20 1700   Wound Depth (cm) 0.1 cm 12/29/20 1700   Wound Volume (cm^3) 0.02 cm^3 12/29/20 1700   Wound Surface Area (cm^2) 0.15 cm^2 12/29/20 1700   Care Cleansed with:;Soap and water;Sterile normal saline 12/29/20 1700    Dressing Changed 12/29/20 1700   Off Loading Football dressing 12/29/20 1700            Incision/Site 12/18/20 0946 Right Foot (Active)   12/18/20 0946   Present Prior to Hospital Arrival?:    Side: Right   Location: Foot   Orientation:    Incision Type:    Closure Method:    Additional Comments:    Removal Indication and Assessment:    Wound Outcome:    Removal Indications:    Wound Image     12/29/20 1700   Incision WDL ex 12/29/20 1700   Dressing Appearance Moist drainage 12/29/20 1700   Drainage Amount Large 12/29/20 1700   Drainage Characteristics/Odor Serosanguineous 12/29/20 1700   Appearance Yellow;Slough;Red;Tendon;Bone 12/29/20 1700   Black (%), Wound Tissue Color 20 % 12/29/20 1700   Red (%), Wound Tissue Color 10 % 12/29/20 1700   Yellow (%), Wound Tissue Color 70 % 12/29/20 1700   Periwound Area Macerated;Pale white 12/29/20 1700   Wound Edges Open 12/29/20 1700   Wound Length (cm) 9.2 cm 12/29/20 1700   Wound Width (cm) 7.5 cm 12/29/20 1700   Wound Depth (cm) 1.2 cm 12/29/20 1700   Wound Volume (cm^3) 82.8 cm^3 12/29/20 1700   Wound Surface Area (cm^2) 69 cm^2 12/29/20 1700   Care Cleansed with:;Soap and water;Sterile normal saline 12/29/20 1700   Dressing Changed 12/29/20 1700   Periwound Care Moisture barrier applied 12/29/20 1700   Off Loading Football dressing 12/29/20 1700           Plan:                Orders Placed This Encounter   Procedures    Change dressing     Clean with saline. Gentian violet periwound. Right Heel - adaptic touch/steristrips/gauze/ABD/cast padding/stockinet, Right Medial Malleolus/Right Lower Leg - stacy/foam border. Right 5th Toe- stacy/cast padding. Left Foot - antibiotic powder/ointment blend/ABD/cast padding/stockinet.        Follow up in about 10 days (around 1/8/2021) for wound care.     Rubén Davis MD

## 2020-12-30 ENCOUNTER — HOSPITAL ENCOUNTER (OUTPATIENT)
Dept: INTERVENTIONAL RADIOLOGY/VASCULAR | Facility: HOSPITAL | Age: 60
Discharge: HOME | End: 2020-12-30
Attending: FAMILY MEDICINE
Payer: MEDICAID

## 2020-12-30 ENCOUNTER — HOSPITAL ENCOUNTER (OUTPATIENT)
Facility: HOSPITAL | Age: 60
Discharge: HOME | End: 2020-12-30
Attending: RADIOLOGY | Admitting: RADIOLOGY
Payer: MEDICAID

## 2020-12-30 VITALS — DIASTOLIC BLOOD PRESSURE: 66 MMHG | SYSTOLIC BLOOD PRESSURE: 122 MMHG

## 2020-12-30 DIAGNOSIS — R18.8 OTHER ASCITES: ICD-10-CM

## 2020-12-30 PROCEDURE — 49083 ABD PARACENTESIS W/IMAGING: CPT

## 2020-12-30 PROCEDURE — A7048 VACUUM DRAIN BOTTLE/TUBE KIT: HCPCS

## 2020-12-30 PROCEDURE — 49083 ABD PARACENTESIS W/IMAGING: CPT | Mod: ,,, | Performed by: RADIOLOGY

## 2020-12-30 PROCEDURE — 49083 IR PARACENTESIS WITH IMAGING: ICD-10-PCS | Mod: ,,, | Performed by: RADIOLOGY

## 2020-12-30 NOTE — DISCHARGE SUMMARY
Radiology Discharge Summary      Hospital Course: No complications    Admit Date: 12/30/2020  Discharge Date: 12/30/2020     Instructions Given to Patient: Yes    Diet: Resume prior diet     Activity: activity as tolerated    Description of Condition on Discharge: Stable    Vital Signs (Most Recent): BP: 122/66 (12/30/20 1003)    Discharge Disposition: Home    Discharge Diagnosis:   61 y/o M with h/o recurrent painful, tense ascites s/p successful U/S-guided therapeutic LVP with local anesthetic only and albumin infusion post PRN as indicated per institutional protocol. Patient tolerated the procedure well. No immediate post-procedural complications noted.      Thank you for considering IR for the care of your patient.      Zach Cha MD  Interventional Radiology

## 2020-12-30 NOTE — H&P
Radiology History & Physical      SUBJECTIVE:     Chief Complaint: Recurrent painful, tense ascites     History of Present Illness:  Marvin Ray is a 60 y.o. male with PMHx of CKD III and combined systolic/diastolic CHF complicated by recurrent abdominal distension and pain 2/2 recurrent painful, tense ascites without TTP on PE to suggest SBP requiring frequent  therapeutic LVP.      A new outpatient IR consult placed for US-guided therapeutic large-volume paracentesis.    Past Medical History:   Diagnosis Date    Arthritis     Cellulitis     CKD (chronic kidney disease), stage III     Coronary artery disease     Diabetes mellitus     Diabetic retinopathy     Diabetic ulcer of left foot     Glaucoma     Gout     Hyperlipemia     Hypertension     ICD (implantable cardioverter-defibrillator) in place 11/02/2018    Left chest    Non-pressure chronic ulcer of other part of left foot with fat layer exposed 10/23/2018    PVD (peripheral vascular disease)     Type 2 diabetes mellitus with left diabetic foot ulcer 10/29/2018    Unsteady gait     uses a wheelchair     Past Surgical History:   Procedure Laterality Date    AHMED GLAUCOMA IMPLANT Left 2011    DONE AT [facility]    AORTOGRAPHY WITH SERIALOGRAPHY Left 4/30/2019    Procedure: AORTOGRAM, WITH SERIALOGRAPHY, LEFT LOWER EXTREMITY, POSSIBLE INTERVENTION;  Surgeon: Mitch Chan MD;  Location: [facility] OR;  Service: Vascular;  Laterality: Left;  RN PRE OP 4-23-19.  NO H & P, No Cosent  [place]    AORTOGRAPHY WITH SERIALOGRAPHY Right 10/6/2020    Procedure: AORTOGRAM, WITH SERIALOGRAPHY, RIGHT LOWER EXTREMITY ANGIOGRAM, POSSIBLE INTERVENTION;  Surgeon: Mitch Chan MD;  Location: [facility] OR;  Service: Vascular;  Laterality: Right;  RN PREOP 10/1/2020--COVID NEGATIVE ON 10/5    BAERVELDT GLAUCOMA IMPLANT Left 2012    WITH CATARACT EXTRACTION//DONE AT [facility]    BIOPSY Right 12/18/2020    Procedure: Biopsy- trocar biopsy;  Surgeon: Brianna HENNING  FRANCINE Champion;  Location: [facility] OR;  Service: Podiatry;  Laterality: Right;    CARDIAC CATHETERIZATION Left 05/2016    CARDIAC DEFIBRILLATOR PLACEMENT Left 11/02/2018    CATARACT EXTRACTION W/  INTRAOCULAR LENS IMPLANT Left 2012    WITH BAERVEDT//DONE AT [facility]    CATARACT EXTRACTION W/  INTRAOCULAR LENS IMPLANT Right 09/26/2018    COMPLEX ()    CB DESTRUCTION WITH CYCLO G6 Left 02/15/2017        CYST REMOVAL      DEBRIDEMENT OF FOOT  12/28/2018    Procedure: DEBRIDEMENT, FOOT;  Surgeon: Mitch Wall MD;  Location: [facility] OR;  Service: Vascular;;    DEBRIDEMENT OF FOOT  6/5/2019    Procedure: DEBRIDEMENT, FOOT;  Surgeon: Mitch Wall MD;  Location: [facility] OR;  Service: Vascular;;    EXAMINATION UNDER ANESTHESIA Left 12/5/2018    Procedure: Exam under anesthesia, left foot debridement, washout and all other indicated procedures;  Surgeon: Mitch Wall MD;  Location: [facility] OR;  Service: Vascular;  Laterality: Left;  1030AM START  RN PREOP 12/3/2018-----AICD  SEEN BY DR JAMES 12/4    EXAMINATION UNDER ANESTHESIA Left 2/13/2019    Procedure: LEFT FOOT WOUND DEBRIDEMENT, WASHOUT AND ALL OTHER INDICATED PROCEDURES;  Surgeon: Mitch Wall MD;  Location: [facility] OR;  Service: Vascular;  Laterality: Left;  requesting 1st case start  THIS CASE 1ST PER DR WALL ON 2/1/19 @ 844AM -[place]  RN PREOP 2/6/2019  HAS CARDIAC CLEARANCE    EXAMINATION UNDER ANESTHESIA Left 12/28/2018    Procedure: Exam under anesthesia, left foot debridement, left foot washout, possible left second through fifth metatarsal resection;  Surgeon: Mitch Wall MD;  Location: [facility] OR;  Service: Vascular;  Laterality: Left;  1:00pm start per julissa @ 8:58am  RN  PHONE PRE OP 12-27-18--=Pt coming from [facility] [address]  NEED CONSENT    EXAMINATION UNDER ANESTHESIA Left 6/5/2019    Procedure: LEFT FOOT DEBRIDEMENT, WASHOUT AND ALL OTHER INDICATED PROCEDURES;  Surgeon: Mitch Wall MD;   Location: [facility] OR;  Service: Vascular;  Laterality: Left;  RN PRE OP 5-31-19------ICD------NEED CONSENT    EXAMINATION UNDER ANESTHESIA Right 11/9/2020    Procedure: RIGHT FOOT DEBRIDEMENT, WASHOUT AND ALL OTHER INDICATED PROCEDURES;  Surgeon: Mitch Chan MD;  Location: [facility] OR;  Service: Vascular;  Laterality: Right;  RN PREOP 10/27/2020, --COVID NEGATIVE ON 11/6, has cardiac clearance/Orient 10/27/2020, pt has ICD  NEEDS ORDERS    EYE SURGERY      CAT EXT OU    INCISION AND DRAINAGE Left 11/14/2018    Procedure: Incision and Drainage, left lower extremity debridement, washout;  Surgeon: Mitch Chan MD;  Location: [facility] OR;  Service: Vascular;  Laterality: Left;    INCISION AND DRAINAGE Left 11/16/2018    Procedure: INCISION AND DRAINAGE;  Surgeon: Mitch Chan MD;  Location: [facility] OR;  Service: Vascular;  Laterality: Left;    INCISION AND DRAINAGE Right 11/18/2020    Procedure: Debridement and washout right lower extremity wounds;  Surgeon: Mitch Chan MD;  Location: [facility] OR 2ND FLR;  Service: Vascular;  Laterality: Right;    INTRAOCULAR PROSTHESES INSERTION Right 9/26/2018    Procedure: INSERTION, IOL PROSTHESIS;  Surgeon: Perla Cortés MD;  Location: [facility] OR 1ST FLR;  Service: Ophthalmology;  Laterality: Right;    PERITONEOCENTESIS N/A 3/1/2019    Procedure: PARACENTESIS, ABDOMINAL, INITIAL;  Surgeon: Dosc Diagnostic Provider;  Location: [facility] OR;  Service: Radiology;  Laterality: N/A;    PERITONEOCENTESIS N/A 3/25/2019    Procedure: PARACENTESIS, ABDOMINAL, INITIAL;  Surgeon: Dosc Diagnostic Provider;  Location: [facility] OR;  Service: Radiology;  Laterality: N/A;    PERITONEOCENTESIS N/A 7/22/2019    Procedure: PARACENTESIS, ABDOMINAL, INITIAL;  Surgeon: Dosc Diagnostic Provider;  Location: [facility] OR;  Service: Radiology;  Laterality: N/A;    PERITONEOCENTESIS N/A 8/16/2019    Procedure: PARACENTESIS, ABDOMINAL, INITIAL;  Surgeon: Dosc Diagnostic Provider;  [facility] OR;  Service: Radiology;  Laterality: N/A;    PERITONEOCENTESIS N/A 9/26/2019    Procedure: PARACENTESIS, ABDOMINAL;  Surgeon: Dosc Diagnostic Provider;  Location: [facility] OR;  Service: Radiology;  Laterality: N/A;    PERITONEOCENTESIS N/A 10/11/2019    Procedure: PARACENTESIS, ABDOMINAL;  Surgeon: Dosc Diagnostic Provider;  Location: [facility] OR;  Service: Radiology;  Laterality: N/A;    PERITONEOCENTESIS N/A 10/31/2019    Procedure: PARACENTESIS, ABDOMINAL;  Surgeon: Dos Diagnostic Provider;  Location: [facility] OR;  Service: Radiology;  Laterality: N/A;    PERITONEOCENTESIS N/A 11/18/2019    Procedure: PARACENTESIS, ABDOMINAL;  Surgeon: Dos Diagnostic Provider;  Location: [facility] OR;  Service: Radiology;  Laterality: N/A;    PERITONEOCENTESIS N/A 12/16/2019    Procedure: PARACENTESIS, ABDOMINAL;  Surgeon: Dos Diagnostic Provider;  Location: [facility] OR;  Service: Radiology;  Laterality: N/A;  2PM START    PERITONEOCENTESIS N/A 2/7/2020    Procedure: PARACENTESIS, ABDOMINAL;  Surgeon: Dos Diagnostic Provider;  Location: [facility] OR;  Service: Radiology;  Laterality: N/A;  REQUESTED 10:30A START    PERITONEOCENTESIS N/A 2/19/2020    Procedure: PARACENTESIS, ABDOMINAL;  Surgeon: Dos Diagnostic Provider;  Location: [facility] OR;  Service: Radiology;  Laterality: N/A;    PERITONEOCENTESIS N/A 2/28/2020    Procedure: PARACENTESIS, ABDOMINAL;  Surgeon: Dos Diagnostic Provider;  Location: [facility] OR;  Service: Radiology;  Laterality: N/A;  REQUESTED 10AM START    PHACOEMULSIFICATION OF CATARACT Right 9/26/2018    Procedure: PHACOEMULSIFICATION, CATARACT;  Surgeon: Perla Cortés MD;  Location: [facility] OR [address] [place] [address];  Service: Ophthalmology;  Laterality: Right;    REPEAT ABDOMINAL PARACENTESIS N/A 2/11/2019    Procedure: PARACENTESIS, ABDOMINAL, REPEAT;  Surgeon: Dos Diagnostic Provider;  Location: [facility] OR;  Service: Radiology;  Laterality: N/A;  1PM START    REPEAT ABDOMINAL PARACENTESIS N/A 9/12/2019    Procedure: PARACENTESIS,  ABDOMINAL, REPEAT;  Surgeon: Dosc Diagnostic Provider;  Location: [facility] OR;  Service: Radiology;  Laterality: N/A;  9AM START    REPEAT ABDOMINAL PARACENTESIS N/A 12/2/2019    Procedure: PARACENTESIS, ABDOMINAL, REPEAT;  Surgeon: Dosc Diagnostic Provider;  Location[facility] OR;  Service: Radiology;  Laterality: N/A;  3PM START    REPEAT ABDOMINAL PARACENTESIS N/A 1/10/2020    Procedure: PARACENTESIS, ABDOMINAL, REPEAT;  Surgeon: Dosc Diagnostic Provider;  Location: [facility] OR;  Service: Radiology;  Laterality: N/A;  1130AM START    REPEAT ABDOMINAL PARACENTESIS N/A 1/20/2020    Procedure: PARACENTESIS, ABDOMINAL, REPEAT;  Surgeon: Dosc Diagnostic Provider;  Location: [facility] OR;  Service: Radiology;  Laterality: N/A;  1PM START    REPEAT ABDOMINAL PARACENTESIS N/A 1/31/2020    Procedure: PARACENTESIS, ABDOMINAL, REPEAT;  Surgeon: Dosc Diagnostic Provider;  [facility] OR;  Service: Radiology;  Laterality: N/A;  10AM START    REPEAT ABDOMINAL PARACENTESIS N/A 3/16/2020    Procedure: PARACENTESIS, ABDOMINAL, REPEAT;  Surgeon: Dosc Diagnostic Provider;  Location: [facility] OR;  Service: Radiology;  Laterality: N/A;  10AM START    REPEAT ABDOMINAL PARACENTESIS N/A 3/30/2020    Procedure: PARACENTESIS, ABDOMINAL, REPEAT;  Surgeon: Dosc Diagnostic Provider;  Location: [facility] OR;  Service: Radiology;  Laterality: N/A;    REPEAT ABDOMINAL PARACENTESIS N/A 4/9/2020    Procedure: PARACENTESIS, ABDOMINAL, REPEAT;  Surgeon: Dosc Diagnostic Provider;  Location: [facility] OR;  Service: Radiology;  Laterality: N/A;  1130AM START    REPEAT ABDOMINAL PARACENTESIS N/A 5/8/2020    Procedure: PARACENTESIS, ABDOMINAL, REPEAT;  Surgeon: Dosc Diagnostic Provider;  [facility];  Service: Radiology;  Laterality: N/A;  9AM START    REPEAT ABDOMINAL PARACENTESIS N/A 5/21/2020    Procedure: PARACENTESIS, ABDOMINAL, REPEAT;  Surgeon: Dosc Diagnostic Provider;  [facility];  Service: Radiology;  Laterality: N/A;  10AM START    REPEAT  ABDOMINAL PARACENTESIS N/A 6/5/2020    Procedure: PARACENTESIS, ABDOMINAL, REPEAT;  Surgeon: Dosc Diagnostic Provider;  Location: [facility] OR;  Service: Radiology;  Laterality: N/A;  NOON START    REPEAT ABDOMINAL PARACENTESIS N/A 7/10/2020    Procedure: PARACENTESIS, ABDOMINAL, REPEAT;  Surgeon: Dosc Diagnostic Provider;  Location: [facility] OR;  Service: Radiology;  Laterality: N/A;  1PM START    REPEAT ABDOMINAL PARACENTESIS N/A 8/20/2020    Procedure: PARACENTESIS, ABDOMINAL, REPEAT;  Surgeon: Dosc Diagnostic Provider;  Location: [facility] OR;  Service: Radiology;  Laterality: N/A;  1PM START    REPEAT ABDOMINAL PARACENTESIS N/A 9/4/2020    Procedure: PARACENTESIS, ABDOMINAL, REPEAT;  Surgeon: Dos Diagnostic Provider;  Location: [facility] OR;  Service: Radiology;  Laterality: N/A;  11 AM START    REPEAT ABDOMINAL PARACENTESIS N/A 9/16/2020    Procedure: PARACENTESIS, ABDOMINAL, REPEAT;  Surgeon: Dosc Diagnostic Provider;  Location: [facility] OR;  Service: Radiology;  Laterality: N/A;  START 2:00 P.M.    REPEAT ABDOMINAL PARACENTESIS N/A 9/28/2020    Procedure: PARACENTESIS, ABDOMINAL, REPEAT;  Surgeon: Dos Diagnostic Provider;  Location: [facility] OR;  Service: Radiology;  Laterality: N/A;  1 P.M. START    REPEAT ABDOMINAL PARACENTESIS N/A 11/3/2020    Procedure: PARACENTESIS, ABDOMINAL, REPEAT;  Surgeon: Dosc Diagnostic Provider;  Location: [facility] OR;  Service: Radiology;  Laterality: N/A;  11AM START    REPEAT ABDOMINAL PARACENTESIS N/A 11/13/2020    Procedure: PARACENTESIS, ABDOMINAL, REPEAT;  Surgeon: Dosc Diagnostic Provider;  Location: [facility] OR;  Service: Radiology;  Laterality: N/A;  12PM START    REPEAT ABDOMINAL PARACENTESIS N/A 11/23/2020    Procedure: PARACENTESIS, ABDOMINAL, REPEAT;  Surgeon: Dosc Diagnostic Provider;  Location: [facility] OR;  Service: Radiology;  Laterality: N/A;    REPEAT ABDOMINAL PARACENTESIS N/A 12/1/2020    Procedure: PARACENTESIS, ABDOMINAL, REPEAT;  Surgeon: Dosc Diagnostic Provider;  Location: [facility]  OR;  Service: Radiology;  Laterality: N/A;  11AM START    REPEAT ABDOMINAL PARACENTESIS N/A 12/8/2020    Procedure: PARACENTESIS, ABDOMINAL, REPEAT;  Surgeon: [facility] Diagnostic Provider;  Location: [facility] OR;  Service: Radiology;  Laterality: N/A;  93AM START    REPEAT ABDOMINAL PARACENTESIS N/A 12/17/2020    Procedure: PARACENTESIS, ABDOMINAL, REPEAT;  Surgeon: [facility] Diagnostic Provider;  Location: [facility] OR;  Service: Radiology;  Laterality: N/A;  10AM START    REVISION OF PROCEDURE INVOLVING GLAUCOMA DRAINAGE DEVICE Left 10/2/2019    EXTRUDING BAERVELDT /WITH PERICARDIAL PATCH GRAFT ()    Right foot surgery  10/2014    TOE AMPUTATION Right     first and second    TOE AMPUTATION Left 11/16/2018    Procedure: AMPUTATION, TOES  2-5;  Surgeon: Mitch Chan MD;  Location: [facility] OR;  Service: Vascular;  Laterality: Left;    TOE AMPUTATION Left 12/5/2018    Procedure: AMPUTATION, TOE;  Surgeon: Mitch Chan MD;  Location: [facility] OR;  Service: Vascular;  Laterality: Left;  left great toe    TONSILLECTOMY       Home Meds:   Prior to Admission medications    Medication Sig Start Date End Date Taking? Authorizing Provider   allopurinoL (ZYLOPRIM) 300 MG tablet Take 1 tablet (300 mg total) by mouth once daily. 10/6/20   Rubén Davis MD   aspirin (ECOTRIN) 81 MG EC tablet Take 81 mg by mouth once daily.    Historical Provider   bisacodyl (DULCOLAX) 5 mg EC tablet Take 5 mg by mouth daily as needed for Constipation.    Historical Provider   brimonidine 0.2% (ALPHAGAN) 0.2 % Drop Place 1 drop into both eyes 3 (three) times daily. 12/12/19   Perla Cortés MD   carvediloL (COREG) 3.125 MG tablet TAKE 1 TABLET(3.125 MG) BY MOUTH TWICE DAILY 11/23/20   Rubén Davis MD   coenzyme Q10 100 mg capsule Take 100 mg by mouth every morning.    Historical Provider   collagenase (SANTYL) ointment Apply topically once daily.  Patient not taking: Reported on 12/28/2020 12/15/20   Rubén Davis MD  "  dorzolamide-timolol 2-0.5% (COSOPT) 22.3-6.8 mg/mL ophthalmic solution Place 1 drop into both eyes 3 (three) times daily. 12/12/19   Perla Cortés MD   doxycycline (VIBRAMYCIN) 100 MG Cap Take 1 capsule (100 mg total) by mouth 2 (two) times daily. 12/18/20   Brianna Champion DPM   ezetimibe (ZETIA) 10 mg tablet Take 1 tablet (10 mg total) by mouth once daily. 11/23/20   Rubén Davis MD   fish oil-omega-3 fatty acids 300-1,000 mg capsule Take 1 capsule by mouth 2 (two) times daily. 1/23/19   Sara Ching MD   fluconazole (DIFLUCAN) 200 MG Tab Take 2 tablets (400 mg total) by mouth once daily. for 14 days 12/28/20 1/11/21  Dixon West MD   gabapentin (NEURONTIN) 100 MG capsule TAKE 2 CAPSULES(200 MG) BY MOUTH EVERY EVENING 12/15/20   Rubén Davis MD   HYDROcodone-acetaminophen (NORCO) 7.5-325 mg per tablet Take 1 tablet by mouth every 8 (eight) hours as needed for Pain.  Patient not taking: Reported on 12/28/2020 10/20/20   Rubén Davis MD   insulin degludec-liraglutide (XULTOPHY 100/3.6) 100 unit-3.6 mg /mL (3 mL) InPn Inject 35 Units into the skin once daily. 12/9/20   Sheryl Madison NP   loratadine (CLARITIN) 10 mg tablet Take 10 mg by mouth daily as needed for Allergies.    Historical Provider   MULTIVIT,THER IRON,CA,FA & MIN (MULTIVITAMIN) Tab Take 1 tablet by mouth every morning.     Historical Provider   oxyCODONE-acetaminophen (PERCOCET) 5-325 mg per tablet Take 1 tablet by mouth every 6 (six) hours as needed for Pain. 12/18/20   Brianna Champion DPM   pen needle, diabetic (BD ULTRA-FINE AMADOR PEN NEEDLE) 32 gauge x 5/32" Ndle 1 Device by Misc.(Non-Drug; Combo Route) route 2 (two) times a day. 9/11/20   Sheryl Madison NP   torsemide (DEMADEX) 20 MG Tab Take 4 tablets (80 mg total) by mouth 2 (two) times daily. 11/5/20 12/18/20  Rubén Davis MD     Anticoagulants/Antiplatelets: no anticoagulation    Allergies:   Review of patient's allergies indicates:   Allergen Reactions    " Statins-hmg-coa reductase inhibitors      Generalized Pain    Onglyza [saxagliptin]     Penicillins Rash     Sedation History:  no adverse reactions     Review of Systems:   Hematological: no known coagulopathies  Respiratory: positive for - orthopnea and shortness of breath  Cardiovascular: positive for - dyspnea on exertion, orthopnea and shortness of breath  Gastrointestinal: positive for - abdominal pain and distension  Genito-Urinary: no dysuria, trouble voiding, or hematuria  Musculoskeletal: negative  Neurological: no TIA or stroke symptoms      OBJECTIVE:     Vital Signs (Most Recent)     Physical Exam:  General: no acute distress  Mental Status: alert and oriented to person, place and time  HEENT: normocephalic, atraumatic  Chest: mild labored breathing  Heart: regular heart rate  Abdomen: +distended. No TTP/r/g.  Extremity: moves all extremities    Laboratory  Lab Results   Component Value Date    INR 1.1 05/31/2019       Lab Results   Component Value Date    WBC 5.33 12/16/2020    HGB 12.1 (L) 12/16/2020    HCT 38.7 (L) 12/16/2020    MCV 89 12/16/2020     12/16/2020      Lab Results   Component Value Date     (H) 12/16/2020     12/16/2020    K 3.9 12/16/2020     12/16/2020    CO2 28 12/16/2020    BUN 30 (H) 12/16/2020    CREATININE 1.1 12/16/2020    CALCIUM 8.4 (L) 12/16/2020    MG 2.1 05/31/2019    ALT 9 (L) 12/16/2020    AST 13 12/16/2020    ALBUMIN 2.6 (L) 12/16/2020    BILITOT 0.5 12/16/2020     ASSESSMENT/PLAN:     61 y/o M with CKD III and combined systolic/diastolic CHF complicated by recurrent abdominal distension and pain 2/2 recurrent painful, tense ascites without TTP on PE to suggest SBP, requiring frequent large-volume therapeutic paracentesis.      1. Recurrent painful, tense ascites - Will attempt U/S-guided therapeutic paracentesis with local anesthetic only and albumin infusion post PRN per institutional protocol.      Risks (including, but not limited to,  pain, bleeding, infection, damage to nearby structures, failure to obtain sufficient material for a diagnosis, the need for additional procedures, and death), benefits, and alternatives were discussed with the patient. All questions were answered to the best of my abilities. The patient wishes to proceed with the procedure. Written informed consent was obtained.     Thank you for considering IR for the care of your patient.      Zach Cha MD  Interventional Radiology

## 2020-12-30 NOTE — NURSING
Paracentesis complete, 5700 MLs removed. Tolerated well. No albumin administered per protocol. Dressing applied to right abdominal puncture site, dressing clean dry and intact. Pt given dischrge instructions and handouts, pt verbalizes understanding. Questions answered. Pt denies pain and discomfort. Pt wheeled to lobby for transport home with family. Will call for next appointment when needed.

## 2020-12-30 NOTE — BRIEF OP NOTE
Radiology Post-Procedure Note     Pre Op Diagnosis: Recurrent painful, tense ascites  Post Op Diagnosis: Same     Procedure: U/S-guided therapeutic LVP     Procedure performed by: Zach Cha MD     Written Informed Consent Obtained: Yes  Specimen Removed: YES, 5700-cc of thin, straw-colored ascitic fluid  Estimated Blood Loss: none     Findings:   Successful therapeutic large-volume paracentesis with local anesthetic only and albumin infusion post PRN as indicated per institutional protocol. Patient tolerated the procedure well. No immediate post-procedural complications noted.       Thank you for considering IR for the care of your patient.      Zach Cha MD  Interventional Radiology

## 2021-01-01 ENCOUNTER — HOSPITAL ENCOUNTER (OUTPATIENT)
Facility: HOSPITAL | Age: 61
Discharge: HOME | End: 2021-06-04
Attending: RADIOLOGY | Admitting: RADIOLOGY
Payer: MEDICARE

## 2021-01-01 ENCOUNTER — HOSPITAL ENCOUNTER (OUTPATIENT)
Dept: INTERVENTIONAL RADIOLOGY/VASCULAR | Facility: HOSPITAL | Age: 61
Discharge: HOME | End: 2021-12-23
Attending: FAMILY MEDICINE
Payer: MEDICARE

## 2021-01-01 ENCOUNTER — OFFICE VISIT (OUTPATIENT)
Dept: VASCULAR SURGERY | Facility: CLINIC | Age: 61
End: 2021-01-01
Payer: MEDICARE

## 2021-01-01 ENCOUNTER — HOSPITAL ENCOUNTER (OUTPATIENT)
Dept: PREADMISSION TESTING | Facility: HOSPITAL | Age: 61
Discharge: HOME | End: 2021-05-13
Attending: SURGERY
Payer: MEDICARE

## 2021-01-01 ENCOUNTER — IMMUNIZATION (OUTPATIENT)
Dept: OBSTETRICS AND GYNECOLOGY | Facility: CLINIC | Age: 61
End: 2021-01-01
Payer: MEDICARE

## 2021-01-01 ENCOUNTER — HOSPITAL ENCOUNTER (OUTPATIENT)
Dept: INTERVENTIONAL RADIOLOGY/VASCULAR | Facility: HOSPITAL | Age: 61
Discharge: HOME | End: 2021-10-28
Attending: FAMILY MEDICINE | Admitting: RADIOLOGY
Payer: MEDICARE

## 2021-01-01 ENCOUNTER — HOSPITAL ENCOUNTER (OUTPATIENT)
Dept: CARDIOLOGY | Facility: HOSPITAL | Age: 61
Discharge: HOME | End: 2021-05-03
Attending: SURGERY
Payer: MEDICARE

## 2021-01-01 ENCOUNTER — HOSPITAL ENCOUNTER (OUTPATIENT)
Dept: INTERVENTIONAL RADIOLOGY/VASCULAR | Facility: HOSPITAL | Age: 61
Discharge: HOME | End: 2021-07-20
Attending: FAMILY MEDICINE | Admitting: RADIOLOGY
Payer: MEDICARE

## 2021-01-01 ENCOUNTER — HOSPITAL ENCOUNTER (OUTPATIENT)
Dept: INTERVENTIONAL RADIOLOGY/VASCULAR | Facility: HOSPITAL | Age: 61
Discharge: HOME | End: 2021-04-16
Attending: FAMILY MEDICINE
Payer: MEDICARE

## 2021-01-01 ENCOUNTER — HOSPITAL ENCOUNTER (OUTPATIENT)
Dept: INTERVENTIONAL RADIOLOGY/VASCULAR | Facility: HOSPITAL | Age: 61
Discharge: HOME | End: 2021-04-26
Attending: FAMILY MEDICINE
Payer: MEDICARE

## 2021-01-01 ENCOUNTER — HOSPITAL ENCOUNTER (OUTPATIENT)
Dept: RADIOLOGY | Facility: HOSPITAL | Age: 61
Discharge: HOME | End: 2021-04-05
Attending: SURGERY
Payer: MEDICARE

## 2021-01-01 ENCOUNTER — PATIENT MESSAGE (OUTPATIENT)
Dept: WOUND CARE | Facility: HOSPITAL | Age: 61
End: 2021-01-01

## 2021-01-01 ENCOUNTER — EXTERNAL HOME HEALTH (OUTPATIENT)
Dept: HOME HEALTH SERVICES | Facility: HOSPITAL | Age: 61
End: 2021-01-01
Payer: MEDICARE

## 2021-01-01 ENCOUNTER — OFFICE VISIT (OUTPATIENT)
Dept: OPHTHALMOLOGY | Facility: CLINIC | Age: 61
End: 2021-01-01
Payer: MEDICARE

## 2021-01-01 ENCOUNTER — HOSPITAL ENCOUNTER (OUTPATIENT)
Dept: WOUND CARE | Facility: HOSPITAL | Age: 61
Discharge: HOME | End: 2021-07-27
Attending: FAMILY MEDICINE
Payer: MEDICARE

## 2021-01-01 ENCOUNTER — HOSPITAL ENCOUNTER (OUTPATIENT)
Dept: INTERVENTIONAL RADIOLOGY/VASCULAR | Facility: HOSPITAL | Age: 61
Discharge: HOME | End: 2021-12-16
Attending: FAMILY MEDICINE | Admitting: RADIOLOGY
Payer: MEDICARE

## 2021-01-01 ENCOUNTER — HOSPITAL ENCOUNTER (OUTPATIENT)
Dept: INTERVENTIONAL RADIOLOGY/VASCULAR | Facility: HOSPITAL | Age: 61
Discharge: HOME | End: 2021-10-07
Attending: FAMILY MEDICINE | Admitting: RADIOLOGY
Payer: MEDICARE

## 2021-01-01 ENCOUNTER — HOSPITAL ENCOUNTER (OUTPATIENT)
Dept: INTERVENTIONAL RADIOLOGY/VASCULAR | Facility: HOSPITAL | Age: 61
Discharge: HOME | End: 2021-06-14
Attending: FAMILY MEDICINE
Payer: MEDICARE

## 2021-01-01 ENCOUNTER — HOSPITAL ENCOUNTER (OUTPATIENT)
Dept: WOUND CARE | Facility: HOSPITAL | Age: 61
Discharge: HOME | End: 2021-06-01
Attending: FAMILY MEDICINE
Payer: MEDICARE

## 2021-01-01 ENCOUNTER — HOSPITAL ENCOUNTER (OUTPATIENT)
Dept: INTERVENTIONAL RADIOLOGY/VASCULAR | Facility: HOSPITAL | Age: 61
Discharge: HOME | End: 2021-04-07
Attending: FAMILY MEDICINE
Payer: MEDICARE

## 2021-01-01 ENCOUNTER — HOSPITAL ENCOUNTER (OUTPATIENT)
Dept: INTERVENTIONAL RADIOLOGY/VASCULAR | Facility: HOSPITAL | Age: 61
Discharge: HOME | End: 2021-11-04
Attending: FAMILY MEDICINE | Admitting: RADIOLOGY
Payer: MEDICARE

## 2021-01-01 ENCOUNTER — HOSPITAL ENCOUNTER (OUTPATIENT)
Facility: HOSPITAL | Age: 61
Discharge: HOME | End: 2021-04-16
Attending: RADIOLOGY | Admitting: RADIOLOGY
Payer: MEDICARE

## 2021-01-01 ENCOUNTER — HOSPITAL ENCOUNTER (EMERGENCY)
Facility: HOSPITAL | Age: 61
Discharge: HOME | End: 2021-08-14
Attending: EMERGENCY MEDICINE
Payer: MEDICARE

## 2021-01-01 ENCOUNTER — HOSPITAL ENCOUNTER (OUTPATIENT)
Dept: INTERVENTIONAL RADIOLOGY/VASCULAR | Facility: HOSPITAL | Age: 61
Discharge: HOME | End: 2021-07-26
Attending: FAMILY MEDICINE
Payer: MEDICARE

## 2021-01-01 ENCOUNTER — PATIENT MESSAGE (OUTPATIENT)
Dept: INFECTIOUS DISEASES | Facility: CLINIC | Age: 61
End: 2021-01-01

## 2021-01-01 ENCOUNTER — HOSPITAL ENCOUNTER (OUTPATIENT)
Facility: HOSPITAL | Age: 61
Discharge: HOME | End: 2021-04-26
Attending: FAMILY MEDICINE | Admitting: RADIOLOGY
Payer: MEDICARE

## 2021-01-01 ENCOUNTER — HOSPITAL ENCOUNTER (OUTPATIENT)
Dept: INTERVENTIONAL RADIOLOGY/VASCULAR | Facility: HOSPITAL | Age: 61
Discharge: HOME | End: 2021-12-30
Attending: FAMILY MEDICINE
Payer: MEDICARE

## 2021-01-01 ENCOUNTER — TELEPHONE (OUTPATIENT)
Dept: VASCULAR SURGERY | Facility: CLINIC | Age: 61
End: 2021-01-01

## 2021-01-01 ENCOUNTER — HOSPITAL ENCOUNTER (OUTPATIENT)
Dept: WOUND CARE | Facility: HOSPITAL | Age: 61
Discharge: HOME | End: 2021-04-13
Attending: FAMILY MEDICINE
Payer: MEDICARE

## 2021-01-01 ENCOUNTER — HOSPITAL ENCOUNTER (OUTPATIENT)
Dept: WOUND CARE | Facility: HOSPITAL | Age: 61
Discharge: HOME | End: 2021-07-13
Attending: FAMILY MEDICINE
Payer: MEDICARE

## 2021-01-01 ENCOUNTER — HOSPITAL ENCOUNTER (OUTPATIENT)
Dept: WOUND CARE | Facility: HOSPITAL | Age: 61
Discharge: HOME | End: 2021-09-21
Attending: FAMILY MEDICINE
Payer: MEDICARE

## 2021-01-01 ENCOUNTER — HOSPITAL ENCOUNTER (OUTPATIENT)
Dept: WOUND CARE | Facility: HOSPITAL | Age: 61
Discharge: HOME | End: 2021-06-22
Attending: FAMILY MEDICINE
Payer: MEDICARE

## 2021-01-01 ENCOUNTER — TELEPHONE (OUTPATIENT)
Dept: WOUND CARE | Facility: HOSPITAL | Age: 61
End: 2021-01-01

## 2021-01-01 ENCOUNTER — HOSPITAL ENCOUNTER (OUTPATIENT)
Dept: INTERVENTIONAL RADIOLOGY/VASCULAR | Facility: HOSPITAL | Age: 61
Discharge: HOME | End: 2021-07-06
Attending: FAMILY MEDICINE | Admitting: RADIOLOGY
Payer: MEDICARE

## 2021-01-01 ENCOUNTER — HOSPITAL ENCOUNTER (OUTPATIENT)
Dept: WOUND CARE | Facility: HOSPITAL | Age: 61
Discharge: HOME | End: 2021-09-07
Attending: FAMILY MEDICINE
Payer: MEDICARE

## 2021-01-01 ENCOUNTER — LAB VISIT (OUTPATIENT)
Dept: LAB | Facility: HOSPITAL | Age: 61
End: 2021-01-01
Attending: NURSE PRACTITIONER
Payer: MEDICARE

## 2021-01-01 ENCOUNTER — HOSPITAL ENCOUNTER (OUTPATIENT)
Dept: WOUND CARE | Facility: HOSPITAL | Age: 61
Discharge: HOME | End: 2021-06-15
Attending: FAMILY MEDICINE
Payer: MEDICARE

## 2021-01-01 ENCOUNTER — HOSPITAL ENCOUNTER (OUTPATIENT)
Dept: INTERVENTIONAL RADIOLOGY/VASCULAR | Facility: HOSPITAL | Age: 61
Discharge: HOME | End: 2021-10-21
Attending: FAMILY MEDICINE
Payer: MEDICARE

## 2021-01-01 ENCOUNTER — HOSPITAL ENCOUNTER (OUTPATIENT)
Dept: WOUND CARE | Facility: HOSPITAL | Age: 61
Discharge: HOME | End: 2021-11-30
Attending: FAMILY MEDICINE
Payer: MEDICARE

## 2021-01-01 ENCOUNTER — HOSPITAL ENCOUNTER (OUTPATIENT)
Dept: INTERVENTIONAL RADIOLOGY/VASCULAR | Facility: HOSPITAL | Age: 61
Discharge: HOME | End: 2021-10-14
Attending: FAMILY MEDICINE | Admitting: RADIOLOGY
Payer: MEDICARE

## 2021-01-01 ENCOUNTER — HOSPITAL ENCOUNTER (OUTPATIENT)
Dept: INTERVENTIONAL RADIOLOGY/VASCULAR | Facility: HOSPITAL | Age: 61
Discharge: HOME | End: 2021-11-24
Attending: FAMILY MEDICINE
Payer: MEDICARE

## 2021-01-01 ENCOUNTER — HOSPITAL ENCOUNTER (OUTPATIENT)
Dept: INTERVENTIONAL RADIOLOGY/VASCULAR | Facility: HOSPITAL | Age: 61
Discharge: HOME | End: 2021-05-06
Attending: FAMILY MEDICINE
Payer: MEDICARE

## 2021-01-01 ENCOUNTER — ANESTHESIA EVENT (OUTPATIENT)
Dept: SURGERY | Facility: HOSPITAL | Age: 61
DRG: 240 | End: 2021-01-01
Payer: MEDICARE

## 2021-01-01 ENCOUNTER — HOSPITAL ENCOUNTER (OUTPATIENT)
Dept: WOUND CARE | Facility: HOSPITAL | Age: 61
Discharge: HOME | End: 2021-08-24
Attending: FAMILY MEDICINE
Payer: MEDICARE

## 2021-01-01 ENCOUNTER — HOSPITAL ENCOUNTER (OUTPATIENT)
Dept: WOUND CARE | Facility: HOSPITAL | Age: 61
Discharge: HOME | End: 2021-08-10
Attending: FAMILY MEDICINE
Payer: MEDICARE

## 2021-01-01 ENCOUNTER — HOSPITAL ENCOUNTER (EMERGENCY)
Facility: HOSPITAL | Age: 61
Discharge: HOME | End: 2021-08-05
Attending: EMERGENCY MEDICINE
Payer: MEDICARE

## 2021-01-01 ENCOUNTER — OFFICE VISIT (OUTPATIENT)
Dept: INFECTIOUS DISEASES | Facility: CLINIC | Age: 61
End: 2021-01-01
Payer: MEDICARE

## 2021-01-01 ENCOUNTER — PATIENT OUTREACH (OUTPATIENT)
Dept: ADMINISTRATIVE | Facility: OTHER | Age: 61
End: 2021-01-01

## 2021-01-01 ENCOUNTER — HOSPITAL ENCOUNTER (OUTPATIENT)
Dept: WOUND CARE | Facility: HOSPITAL | Age: 61
Discharge: HOME | End: 2021-05-11
Attending: FAMILY MEDICINE
Payer: MEDICARE

## 2021-01-01 ENCOUNTER — HOSPITAL ENCOUNTER (OUTPATIENT)
Dept: WOUND CARE | Facility: HOSPITAL | Age: 61
Discharge: HOME | End: 2021-05-04
Attending: FAMILY MEDICINE
Payer: MEDICARE

## 2021-01-01 ENCOUNTER — HOSPITAL ENCOUNTER (OUTPATIENT)
Facility: HOSPITAL | Age: 61
Discharge: HOME | End: 2021-06-23
Attending: STUDENT IN AN ORGANIZED HEALTH CARE EDUCATION/TRAINING PROGRAM | Admitting: STUDENT IN AN ORGANIZED HEALTH CARE EDUCATION/TRAINING PROGRAM
Payer: MEDICARE

## 2021-01-01 ENCOUNTER — PATIENT MESSAGE (OUTPATIENT)
Dept: WOUND CARE | Facility: HOSPITAL | Age: 61
End: 2021-01-01
Payer: MEDICARE

## 2021-01-01 ENCOUNTER — HOSPITAL ENCOUNTER (OUTPATIENT)
Dept: INTERVENTIONAL RADIOLOGY/VASCULAR | Facility: HOSPITAL | Age: 61
Discharge: HOME | End: 2021-11-11
Attending: FAMILY MEDICINE
Payer: MEDICARE

## 2021-01-01 ENCOUNTER — HOSPITAL ENCOUNTER (OUTPATIENT)
Dept: INTERVENTIONAL RADIOLOGY/VASCULAR | Facility: HOSPITAL | Age: 61
Discharge: HOME | End: 2021-08-27
Attending: FAMILY MEDICINE | Admitting: RADIOLOGY
Payer: MEDICARE

## 2021-01-01 ENCOUNTER — HOSPITAL ENCOUNTER (OUTPATIENT)
Dept: WOUND CARE | Facility: HOSPITAL | Age: 61
Discharge: HOME | End: 2021-11-02
Attending: FAMILY MEDICINE
Payer: MEDICARE

## 2021-01-01 ENCOUNTER — PATIENT MESSAGE (OUTPATIENT)
Dept: ADMINISTRATIVE | Facility: HOSPITAL | Age: 61
End: 2021-01-01

## 2021-01-01 ENCOUNTER — HOSPITAL ENCOUNTER (OUTPATIENT)
Dept: WOUND CARE | Facility: HOSPITAL | Age: 61
Discharge: HOME | End: 2021-04-27
Attending: FAMILY MEDICINE
Payer: MEDICARE

## 2021-01-01 ENCOUNTER — HOSPITAL ENCOUNTER (OUTPATIENT)
Dept: WOUND CARE | Facility: HOSPITAL | Age: 61
Discharge: HOME | End: 2021-10-19
Attending: FAMILY MEDICINE
Payer: MEDICARE

## 2021-01-01 ENCOUNTER — HOSPITAL ENCOUNTER (OUTPATIENT)
Dept: WOUND CARE | Facility: HOSPITAL | Age: 61
Discharge: HOME | End: 2021-06-29
Attending: FAMILY MEDICINE
Payer: MEDICARE

## 2021-01-01 ENCOUNTER — HOSPITAL ENCOUNTER (OUTPATIENT)
Dept: INTERVENTIONAL RADIOLOGY/VASCULAR | Facility: HOSPITAL | Age: 61
Discharge: HOME | DRG: 240 | End: 2021-05-14
Attending: FAMILY MEDICINE
Payer: MEDICARE

## 2021-01-01 ENCOUNTER — HOSPITAL ENCOUNTER (INPATIENT)
Facility: HOSPITAL | Age: 61
LOS: 1 days | Discharge: HOME | DRG: 240 | End: 2021-05-18
Attending: ORTHOPAEDIC SURGERY | Admitting: ORTHOPAEDIC SURGERY
Payer: MEDICARE

## 2021-01-01 ENCOUNTER — HOSPITAL ENCOUNTER (OUTPATIENT)
Dept: INTERVENTIONAL RADIOLOGY/VASCULAR | Facility: HOSPITAL | Age: 61
Discharge: HOME | End: 2021-06-23
Attending: FAMILY MEDICINE
Payer: MEDICARE

## 2021-01-01 ENCOUNTER — HOSPITAL ENCOUNTER (OUTPATIENT)
Dept: INTERVENTIONAL RADIOLOGY/VASCULAR | Facility: HOSPITAL | Age: 61
Discharge: HOME | End: 2021-09-23
Attending: FAMILY MEDICINE
Payer: MEDICARE

## 2021-01-01 ENCOUNTER — HOSPITAL ENCOUNTER (OUTPATIENT)
Dept: INTERVENTIONAL RADIOLOGY/VASCULAR | Facility: HOSPITAL | Age: 61
Discharge: HOME | End: 2021-09-08
Attending: FAMILY MEDICINE
Payer: MEDICARE

## 2021-01-01 ENCOUNTER — HOSPITAL ENCOUNTER (OUTPATIENT)
Dept: WOUND CARE | Facility: HOSPITAL | Age: 61
Discharge: HOME | End: 2021-06-08
Attending: FAMILY MEDICINE
Payer: MEDICARE

## 2021-01-01 ENCOUNTER — HOSPITAL ENCOUNTER (OUTPATIENT)
Facility: HOSPITAL | Age: 61
Discharge: HOME | End: 2021-06-14
Attending: FAMILY MEDICINE | Admitting: RADIOLOGY
Payer: MEDICARE

## 2021-01-01 ENCOUNTER — HOSPITAL ENCOUNTER (OUTPATIENT)
Dept: WOUND CARE | Facility: HOSPITAL | Age: 61
Discharge: HOME | End: 2021-04-20
Attending: FAMILY MEDICINE
Payer: MEDICARE

## 2021-01-01 ENCOUNTER — HOSPITAL ENCOUNTER (OUTPATIENT)
Dept: INTERVENTIONAL RADIOLOGY/VASCULAR | Facility: HOSPITAL | Age: 61
Discharge: HOME | End: 2021-07-13
Attending: FAMILY MEDICINE | Admitting: RADIOLOGY
Payer: MEDICARE

## 2021-01-01 ENCOUNTER — HOSPITAL ENCOUNTER (OUTPATIENT)
Facility: HOSPITAL | Age: 61
Discharge: HOME | End: 2021-05-24
Attending: RADIOLOGY | Admitting: RADIOLOGY
Payer: MEDICARE

## 2021-01-01 ENCOUNTER — ANESTHESIA (OUTPATIENT)
Dept: SURGERY | Facility: HOSPITAL | Age: 61
DRG: 240 | End: 2021-01-01
Payer: MEDICARE

## 2021-01-01 ENCOUNTER — HOSPITAL ENCOUNTER (OUTPATIENT)
Dept: INTERVENTIONAL RADIOLOGY/VASCULAR | Facility: HOSPITAL | Age: 61
Discharge: HOME | End: 2021-09-16
Attending: FAMILY MEDICINE | Admitting: RADIOLOGY
Payer: MEDICARE

## 2021-01-01 ENCOUNTER — HOSPITAL ENCOUNTER (OUTPATIENT)
Dept: INTERVENTIONAL RADIOLOGY/VASCULAR | Facility: HOSPITAL | Age: 61
Discharge: HOME | End: 2021-06-04
Attending: FAMILY MEDICINE
Payer: MEDICARE

## 2021-01-01 ENCOUNTER — HOSPITAL ENCOUNTER (OUTPATIENT)
Facility: HOSPITAL | Age: 61
Discharge: HOME | End: 2021-05-06
Attending: RADIOLOGY | Admitting: RADIOLOGY
Payer: MEDICARE

## 2021-01-01 ENCOUNTER — OFFICE VISIT (OUTPATIENT)
Dept: ENDOCRINOLOGY | Facility: CLINIC | Age: 61
End: 2021-01-01
Payer: MEDICARE

## 2021-01-01 ENCOUNTER — HOSPITAL ENCOUNTER (OUTPATIENT)
Dept: WOUND CARE | Facility: HOSPITAL | Age: 61
Discharge: HOME | End: 2021-12-14
Attending: FAMILY MEDICINE
Payer: MEDICARE

## 2021-01-01 ENCOUNTER — HOSPITAL ENCOUNTER (OUTPATIENT)
Dept: PREADMISSION TESTING | Facility: HOSPITAL | Age: 61
Discharge: HOME | DRG: 240 | End: 2021-05-14
Attending: SURGERY
Payer: MEDICARE

## 2021-01-01 ENCOUNTER — HOSPITAL ENCOUNTER (OUTPATIENT)
Dept: INTERVENTIONAL RADIOLOGY/VASCULAR | Facility: HOSPITAL | Age: 61
Discharge: HOME | End: 2021-06-28
Attending: FAMILY MEDICINE | Admitting: RADIOLOGY
Payer: MEDICARE

## 2021-01-01 ENCOUNTER — NURSE TRIAGE (OUTPATIENT)
Dept: ADMINISTRATIVE | Facility: CLINIC | Age: 61
End: 2021-01-01

## 2021-01-01 ENCOUNTER — HOSPITAL ENCOUNTER (OUTPATIENT)
Dept: INTERVENTIONAL RADIOLOGY/VASCULAR | Facility: HOSPITAL | Age: 61
Discharge: HOME | End: 2021-09-30
Attending: FAMILY MEDICINE | Admitting: RADIOLOGY
Payer: MEDICARE

## 2021-01-01 ENCOUNTER — HOSPITAL ENCOUNTER (OUTPATIENT)
Dept: INTERVENTIONAL RADIOLOGY/VASCULAR | Facility: HOSPITAL | Age: 61
Discharge: HOME | End: 2021-12-09
Attending: FAMILY MEDICINE | Admitting: RADIOLOGY
Payer: MEDICARE

## 2021-01-01 ENCOUNTER — HOSPITAL ENCOUNTER (OUTPATIENT)
Dept: WOUND CARE | Facility: HOSPITAL | Age: 61
Discharge: HOME | End: 2021-05-25
Attending: FAMILY MEDICINE
Payer: MEDICARE

## 2021-01-01 ENCOUNTER — OFFICE VISIT (OUTPATIENT)
Dept: CARDIOLOGY | Facility: CLINIC | Age: 61
End: 2021-01-01
Payer: MEDICARE

## 2021-01-01 ENCOUNTER — HOSPITAL ENCOUNTER (OUTPATIENT)
Dept: INTERVENTIONAL RADIOLOGY/VASCULAR | Facility: HOSPITAL | Age: 61
Discharge: HOME | End: 2021-08-20
Attending: FAMILY MEDICINE | Admitting: RADIOLOGY
Payer: MEDICARE

## 2021-01-01 ENCOUNTER — HOSPITAL ENCOUNTER (OUTPATIENT)
Dept: INTERVENTIONAL RADIOLOGY/VASCULAR | Facility: HOSPITAL | Age: 61
Discharge: HOME | End: 2021-11-18
Attending: FAMILY MEDICINE
Payer: MEDICARE

## 2021-01-01 ENCOUNTER — HOSPITAL ENCOUNTER (OUTPATIENT)
Dept: WOUND CARE | Facility: HOSPITAL | Age: 61
Discharge: HOME | End: 2021-05-28
Attending: FAMILY MEDICINE
Payer: MEDICARE

## 2021-01-01 ENCOUNTER — HOSPITAL ENCOUNTER (OUTPATIENT)
Dept: WOUND CARE | Facility: HOSPITAL | Age: 61
Discharge: HOME | End: 2021-12-28
Attending: FAMILY MEDICINE
Payer: MEDICARE

## 2021-01-01 ENCOUNTER — HOSPITAL ENCOUNTER (OUTPATIENT)
Dept: WOUND CARE | Facility: HOSPITAL | Age: 61
Discharge: HOME | End: 2021-10-05
Attending: FAMILY MEDICINE
Payer: MEDICARE

## 2021-01-01 ENCOUNTER — HOSPITAL ENCOUNTER (OUTPATIENT)
Dept: INTERVENTIONAL RADIOLOGY/VASCULAR | Facility: HOSPITAL | Age: 61
Discharge: HOME | End: 2021-12-02
Attending: FAMILY MEDICINE
Payer: MEDICARE

## 2021-01-01 ENCOUNTER — TELEPHONE (OUTPATIENT)
Dept: RESEARCH | Facility: HOSPITAL | Age: 61
End: 2021-01-01
Payer: MEDICARE

## 2021-01-01 ENCOUNTER — HOSPITAL ENCOUNTER (OUTPATIENT)
Dept: WOUND CARE | Facility: HOSPITAL | Age: 61
Discharge: HOME | End: 2021-11-16
Attending: FAMILY MEDICINE
Payer: MEDICARE

## 2021-01-01 ENCOUNTER — HOSPITAL ENCOUNTER (OUTPATIENT)
Facility: HOSPITAL | Age: 61
Discharge: HOME | End: 2021-04-07
Attending: RADIOLOGY | Admitting: RADIOLOGY
Payer: MEDICARE

## 2021-01-01 ENCOUNTER — HOSPITAL ENCOUNTER (OUTPATIENT)
Dept: INTERVENTIONAL RADIOLOGY/VASCULAR | Facility: HOSPITAL | Age: 61
Discharge: HOME | End: 2021-05-24
Attending: FAMILY MEDICINE | Admitting: RADIOLOGY
Payer: MEDICARE

## 2021-01-01 ENCOUNTER — PATIENT OUTREACH (OUTPATIENT)
Dept: ADMINISTRATIVE | Facility: CLINIC | Age: 61
End: 2021-01-01

## 2021-01-01 ENCOUNTER — HOSPITAL ENCOUNTER (OUTPATIENT)
Dept: PREADMISSION TESTING | Facility: HOSPITAL | Age: 61
Discharge: HOME | End: 2021-09-08
Attending: FAMILY MEDICINE
Payer: MEDICARE

## 2021-01-01 VITALS
HEIGHT: 70 IN | DIASTOLIC BLOOD PRESSURE: 68 MMHG | RESPIRATION RATE: 15 BRPM | SYSTOLIC BLOOD PRESSURE: 132 MMHG | BODY MASS INDEX: 26.69 KG/M2 | OXYGEN SATURATION: 98 % | HEART RATE: 66 BPM

## 2021-01-01 VITALS
HEART RATE: 68 BPM | SYSTOLIC BLOOD PRESSURE: 119 MMHG | OXYGEN SATURATION: 94 % | HEART RATE: 83 BPM | DIASTOLIC BLOOD PRESSURE: 70 MMHG | TEMPERATURE: 98 F | SYSTOLIC BLOOD PRESSURE: 129 MMHG | RESPIRATION RATE: 18 BRPM | TEMPERATURE: 98 F | DIASTOLIC BLOOD PRESSURE: 58 MMHG | RESPIRATION RATE: 16 BRPM | OXYGEN SATURATION: 96 %

## 2021-01-01 VITALS
DIASTOLIC BLOOD PRESSURE: 56 MMHG | TEMPERATURE: 98 F | SYSTOLIC BLOOD PRESSURE: 107 MMHG | OXYGEN SATURATION: 97 % | RESPIRATION RATE: 18 BRPM | HEART RATE: 75 BPM

## 2021-01-01 VITALS — HEART RATE: 83 BPM | SYSTOLIC BLOOD PRESSURE: 120 MMHG | DIASTOLIC BLOOD PRESSURE: 55 MMHG | TEMPERATURE: 98 F

## 2021-01-01 VITALS
TEMPERATURE: 98 F | SYSTOLIC BLOOD PRESSURE: 138 MMHG | DIASTOLIC BLOOD PRESSURE: 55 MMHG | TEMPERATURE: 98 F | HEART RATE: 88 BPM | TEMPERATURE: 98 F | DIASTOLIC BLOOD PRESSURE: 58 MMHG | HEART RATE: 91 BPM | SYSTOLIC BLOOD PRESSURE: 116 MMHG | SYSTOLIC BLOOD PRESSURE: 102 MMHG | RESPIRATION RATE: 20 BRPM | RESPIRATION RATE: 20 BRPM | OXYGEN SATURATION: 99 % | DIASTOLIC BLOOD PRESSURE: 62 MMHG | HEART RATE: 74 BPM

## 2021-01-01 VITALS
OXYGEN SATURATION: 99 % | HEART RATE: 75 BPM | TEMPERATURE: 99 F | DIASTOLIC BLOOD PRESSURE: 65 MMHG | SYSTOLIC BLOOD PRESSURE: 120 MMHG | RESPIRATION RATE: 16 BRPM | DIASTOLIC BLOOD PRESSURE: 56 MMHG | SYSTOLIC BLOOD PRESSURE: 101 MMHG

## 2021-01-01 VITALS
SYSTOLIC BLOOD PRESSURE: 120 MMHG | HEART RATE: 75 BPM | DIASTOLIC BLOOD PRESSURE: 49 MMHG | RESPIRATION RATE: 17 BRPM | DIASTOLIC BLOOD PRESSURE: 67 MMHG | SYSTOLIC BLOOD PRESSURE: 84 MMHG | OXYGEN SATURATION: 96 % | TEMPERATURE: 97 F | TEMPERATURE: 98 F | HEART RATE: 80 BPM

## 2021-01-01 VITALS
DIASTOLIC BLOOD PRESSURE: 64 MMHG | TEMPERATURE: 98 F | RESPIRATION RATE: 18 BRPM | TEMPERATURE: 97 F | SYSTOLIC BLOOD PRESSURE: 151 MMHG | RESPIRATION RATE: 17 BRPM | OXYGEN SATURATION: 96 % | HEART RATE: 86 BPM | HEART RATE: 87 BPM | TEMPERATURE: 98 F | SYSTOLIC BLOOD PRESSURE: 127 MMHG | DIASTOLIC BLOOD PRESSURE: 78 MMHG | HEART RATE: 89 BPM | RESPIRATION RATE: 20 BRPM | DIASTOLIC BLOOD PRESSURE: 60 MMHG | SYSTOLIC BLOOD PRESSURE: 125 MMHG

## 2021-01-01 VITALS
DIASTOLIC BLOOD PRESSURE: 63 MMHG | OXYGEN SATURATION: 98 % | TEMPERATURE: 98 F | HEART RATE: 85 BPM | SYSTOLIC BLOOD PRESSURE: 116 MMHG | RESPIRATION RATE: 18 BRPM

## 2021-01-01 VITALS
BODY MASS INDEX: 26.63 KG/M2 | OXYGEN SATURATION: 98 % | RESPIRATION RATE: 14 BRPM | DIASTOLIC BLOOD PRESSURE: 58 MMHG | TEMPERATURE: 97 F | BODY MASS INDEX: 26.69 KG/M2 | OXYGEN SATURATION: 99 % | SYSTOLIC BLOOD PRESSURE: 106 MMHG | RESPIRATION RATE: 18 BRPM | HEIGHT: 70 IN | HEIGHT: 70 IN | TEMPERATURE: 99 F | WEIGHT: 186 LBS | DIASTOLIC BLOOD PRESSURE: 65 MMHG | HEART RATE: 88 BPM | SYSTOLIC BLOOD PRESSURE: 118 MMHG | HEART RATE: 81 BPM

## 2021-01-01 VITALS
SYSTOLIC BLOOD PRESSURE: 141 MMHG | RESPIRATION RATE: 17 BRPM | HEART RATE: 95 BPM | OXYGEN SATURATION: 95 % | DIASTOLIC BLOOD PRESSURE: 75 MMHG | TEMPERATURE: 98 F

## 2021-01-01 VITALS
TEMPERATURE: 98 F | OXYGEN SATURATION: 96 % | HEART RATE: 73 BPM | RESPIRATION RATE: 18 BRPM | DIASTOLIC BLOOD PRESSURE: 67 MMHG | SYSTOLIC BLOOD PRESSURE: 123 MMHG

## 2021-01-01 VITALS
SYSTOLIC BLOOD PRESSURE: 108 MMHG | HEART RATE: 79 BPM | RESPIRATION RATE: 18 BRPM | DIASTOLIC BLOOD PRESSURE: 56 MMHG | TEMPERATURE: 97 F

## 2021-01-01 VITALS
DIASTOLIC BLOOD PRESSURE: 67 MMHG | SYSTOLIC BLOOD PRESSURE: 127 MMHG | RESPIRATION RATE: 14 BRPM | DIASTOLIC BLOOD PRESSURE: 58 MMHG | SYSTOLIC BLOOD PRESSURE: 116 MMHG | TEMPERATURE: 98 F | HEART RATE: 73 BPM | RESPIRATION RATE: 18 BRPM | TEMPERATURE: 98 F | HEART RATE: 72 BPM | OXYGEN SATURATION: 95 %

## 2021-01-01 VITALS
BODY MASS INDEX: 28.49 KG/M2 | DIASTOLIC BLOOD PRESSURE: 72 MMHG | SYSTOLIC BLOOD PRESSURE: 112 MMHG | WEIGHT: 199 LBS | HEIGHT: 70 IN

## 2021-01-01 VITALS
DIASTOLIC BLOOD PRESSURE: 58 MMHG | DIASTOLIC BLOOD PRESSURE: 70 MMHG | SYSTOLIC BLOOD PRESSURE: 122 MMHG | HEIGHT: 70 IN | WEIGHT: 199 LBS | SYSTOLIC BLOOD PRESSURE: 102 MMHG | BODY MASS INDEX: 28.49 KG/M2 | HEIGHT: 70 IN | BODY MASS INDEX: 28.49 KG/M2 | DIASTOLIC BLOOD PRESSURE: 59 MMHG | SYSTOLIC BLOOD PRESSURE: 103 MMHG | WEIGHT: 199 LBS

## 2021-01-01 VITALS
RESPIRATION RATE: 16 BRPM | HEART RATE: 89 BPM | SYSTOLIC BLOOD PRESSURE: 124 MMHG | TEMPERATURE: 98 F | OXYGEN SATURATION: 100 % | DIASTOLIC BLOOD PRESSURE: 68 MMHG

## 2021-01-01 VITALS
HEIGHT: 70 IN | HEART RATE: 83 BPM | RESPIRATION RATE: 19 BRPM | OXYGEN SATURATION: 96 % | TEMPERATURE: 99 F | BODY MASS INDEX: 26.69 KG/M2 | SYSTOLIC BLOOD PRESSURE: 132 MMHG | DIASTOLIC BLOOD PRESSURE: 73 MMHG

## 2021-01-01 VITALS
OXYGEN SATURATION: 98 % | SYSTOLIC BLOOD PRESSURE: 130 MMHG | DIASTOLIC BLOOD PRESSURE: 70 MMHG | TEMPERATURE: 98 F | RESPIRATION RATE: 20 BRPM | HEART RATE: 75 BPM

## 2021-01-01 VITALS
SYSTOLIC BLOOD PRESSURE: 124 MMHG | DIASTOLIC BLOOD PRESSURE: 72 MMHG | SYSTOLIC BLOOD PRESSURE: 114 MMHG | BODY MASS INDEX: 26.7 KG/M2 | HEIGHT: 70 IN | HEART RATE: 79 BPM | TEMPERATURE: 98 F | DIASTOLIC BLOOD PRESSURE: 79 MMHG

## 2021-01-01 VITALS
TEMPERATURE: 98 F | RESPIRATION RATE: 20 BRPM | HEART RATE: 82 BPM | SYSTOLIC BLOOD PRESSURE: 128 MMHG | TEMPERATURE: 98 F | DIASTOLIC BLOOD PRESSURE: 69 MMHG | BODY MASS INDEX: 26.63 KG/M2 | WEIGHT: 186 LBS | BODY MASS INDEX: 26.64 KG/M2 | HEART RATE: 78 BPM | RESPIRATION RATE: 16 BRPM | HEIGHT: 70 IN | SYSTOLIC BLOOD PRESSURE: 112 MMHG | HEART RATE: 82 BPM | DIASTOLIC BLOOD PRESSURE: 66 MMHG | WEIGHT: 186.06 LBS | HEIGHT: 70 IN | TEMPERATURE: 98 F | DIASTOLIC BLOOD PRESSURE: 92 MMHG | OXYGEN SATURATION: 95 % | DIASTOLIC BLOOD PRESSURE: 58 MMHG | SYSTOLIC BLOOD PRESSURE: 128 MMHG | TEMPERATURE: 98 F | SYSTOLIC BLOOD PRESSURE: 119 MMHG | DIASTOLIC BLOOD PRESSURE: 60 MMHG | DIASTOLIC BLOOD PRESSURE: 59 MMHG | HEART RATE: 87 BPM | TEMPERATURE: 98 F | TEMPERATURE: 98 F | RESPIRATION RATE: 20 BRPM | SYSTOLIC BLOOD PRESSURE: 130 MMHG | HEART RATE: 92 BPM | SYSTOLIC BLOOD PRESSURE: 111 MMHG | HEART RATE: 90 BPM

## 2021-01-01 VITALS
HEART RATE: 80 BPM | TEMPERATURE: 97 F | DIASTOLIC BLOOD PRESSURE: 51 MMHG | TEMPERATURE: 98 F | OXYGEN SATURATION: 95 % | SYSTOLIC BLOOD PRESSURE: 83 MMHG | TEMPERATURE: 98 F | DIASTOLIC BLOOD PRESSURE: 58 MMHG | SYSTOLIC BLOOD PRESSURE: 107 MMHG | HEIGHT: 70 IN | WEIGHT: 186 LBS | DIASTOLIC BLOOD PRESSURE: 56 MMHG | RESPIRATION RATE: 16 BRPM | SYSTOLIC BLOOD PRESSURE: 103 MMHG | HEART RATE: 59 BPM | BODY MASS INDEX: 26.63 KG/M2 | HEART RATE: 89 BPM

## 2021-01-01 VITALS
HEART RATE: 92 BPM | DIASTOLIC BLOOD PRESSURE: 82 MMHG | OXYGEN SATURATION: 99 % | SYSTOLIC BLOOD PRESSURE: 126 MMHG | TEMPERATURE: 98 F | RESPIRATION RATE: 20 BRPM

## 2021-01-01 VITALS
DIASTOLIC BLOOD PRESSURE: 72 MMHG | SYSTOLIC BLOOD PRESSURE: 125 MMHG | TEMPERATURE: 98 F | HEART RATE: 70 BPM | RESPIRATION RATE: 20 BRPM | OXYGEN SATURATION: 97 % | SYSTOLIC BLOOD PRESSURE: 107 MMHG | RESPIRATION RATE: 17 BRPM | HEART RATE: 72 BPM | OXYGEN SATURATION: 97 % | DIASTOLIC BLOOD PRESSURE: 59 MMHG | TEMPERATURE: 98 F

## 2021-01-01 VITALS
SYSTOLIC BLOOD PRESSURE: 110 MMHG | OXYGEN SATURATION: 96 % | HEART RATE: 68 BPM | RESPIRATION RATE: 19 BRPM | TEMPERATURE: 98 F | DIASTOLIC BLOOD PRESSURE: 66 MMHG

## 2021-01-01 VITALS
DIASTOLIC BLOOD PRESSURE: 77 MMHG | OXYGEN SATURATION: 95 % | HEART RATE: 99 BPM | RESPIRATION RATE: 18 BRPM | SYSTOLIC BLOOD PRESSURE: 129 MMHG

## 2021-01-01 VITALS — DIASTOLIC BLOOD PRESSURE: 59 MMHG | SYSTOLIC BLOOD PRESSURE: 112 MMHG

## 2021-01-01 VITALS
TEMPERATURE: 98 F | SYSTOLIC BLOOD PRESSURE: 119 MMHG | DIASTOLIC BLOOD PRESSURE: 65 MMHG | RESPIRATION RATE: 18 BRPM | DIASTOLIC BLOOD PRESSURE: 67 MMHG | HEART RATE: 76 BPM | TEMPERATURE: 98 F | RESPIRATION RATE: 16 BRPM | HEART RATE: 85 BPM | SYSTOLIC BLOOD PRESSURE: 121 MMHG

## 2021-01-01 VITALS
RESPIRATION RATE: 20 BRPM | DIASTOLIC BLOOD PRESSURE: 63 MMHG | HEART RATE: 72 BPM | TEMPERATURE: 98 F | SYSTOLIC BLOOD PRESSURE: 143 MMHG | OXYGEN SATURATION: 98 %

## 2021-01-01 VITALS
TEMPERATURE: 98 F | HEART RATE: 75 BPM | DIASTOLIC BLOOD PRESSURE: 73 MMHG | RESPIRATION RATE: 15 BRPM | OXYGEN SATURATION: 95 % | SYSTOLIC BLOOD PRESSURE: 131 MMHG

## 2021-01-01 VITALS
RESPIRATION RATE: 18 BRPM | SYSTOLIC BLOOD PRESSURE: 148 MMHG | OXYGEN SATURATION: 99 % | HEART RATE: 62 BPM | DIASTOLIC BLOOD PRESSURE: 64 MMHG | TEMPERATURE: 98 F

## 2021-01-01 VITALS
SYSTOLIC BLOOD PRESSURE: 116 MMHG | TEMPERATURE: 98 F | DIASTOLIC BLOOD PRESSURE: 75 MMHG | SYSTOLIC BLOOD PRESSURE: 116 MMHG | SYSTOLIC BLOOD PRESSURE: 132 MMHG | OXYGEN SATURATION: 97 % | DIASTOLIC BLOOD PRESSURE: 65 MMHG | DIASTOLIC BLOOD PRESSURE: 71 MMHG | HEART RATE: 75 BPM | DIASTOLIC BLOOD PRESSURE: 78 MMHG | TEMPERATURE: 97 F | HEART RATE: 73 BPM | TEMPERATURE: 98 F | SYSTOLIC BLOOD PRESSURE: 128 MMHG | HEART RATE: 71 BPM | RESPIRATION RATE: 18 BRPM | TEMPERATURE: 98 F | HEART RATE: 77 BPM

## 2021-01-01 VITALS
TEMPERATURE: 98 F | SYSTOLIC BLOOD PRESSURE: 111 MMHG | RESPIRATION RATE: 18 BRPM | DIASTOLIC BLOOD PRESSURE: 59 MMHG | TEMPERATURE: 98 F | HEART RATE: 75 BPM | DIASTOLIC BLOOD PRESSURE: 61 MMHG | HEART RATE: 75 BPM | SYSTOLIC BLOOD PRESSURE: 106 MMHG

## 2021-01-01 VITALS
SYSTOLIC BLOOD PRESSURE: 126 MMHG | OXYGEN SATURATION: 98 % | TEMPERATURE: 98 F | DIASTOLIC BLOOD PRESSURE: 60 MMHG | HEART RATE: 78 BPM | HEART RATE: 90 BPM | RESPIRATION RATE: 18 BRPM | DIASTOLIC BLOOD PRESSURE: 74 MMHG | TEMPERATURE: 98 F | SYSTOLIC BLOOD PRESSURE: 129 MMHG

## 2021-01-01 VITALS
SYSTOLIC BLOOD PRESSURE: 124 MMHG | HEART RATE: 78 BPM | DIASTOLIC BLOOD PRESSURE: 62 MMHG | RESPIRATION RATE: 18 BRPM | HEART RATE: 79 BPM | OXYGEN SATURATION: 99 % | DIASTOLIC BLOOD PRESSURE: 64 MMHG | RESPIRATION RATE: 16 BRPM | TEMPERATURE: 98 F | OXYGEN SATURATION: 95 % | SYSTOLIC BLOOD PRESSURE: 135 MMHG | TEMPERATURE: 98 F

## 2021-01-01 VITALS
TEMPERATURE: 98 F | HEART RATE: 65 BPM | OXYGEN SATURATION: 95 % | OXYGEN SATURATION: 95 % | TEMPERATURE: 98 F | SYSTOLIC BLOOD PRESSURE: 133 MMHG | DIASTOLIC BLOOD PRESSURE: 67 MMHG | HEART RATE: 80 BPM | SYSTOLIC BLOOD PRESSURE: 121 MMHG | TEMPERATURE: 98 F | RESPIRATION RATE: 18 BRPM | SYSTOLIC BLOOD PRESSURE: 116 MMHG | DIASTOLIC BLOOD PRESSURE: 56 MMHG | OXYGEN SATURATION: 95 % | HEART RATE: 73 BPM | RESPIRATION RATE: 16 BRPM | RESPIRATION RATE: 17 BRPM | DIASTOLIC BLOOD PRESSURE: 77 MMHG

## 2021-01-01 VITALS
DIASTOLIC BLOOD PRESSURE: 68 MMHG | SYSTOLIC BLOOD PRESSURE: 111 MMHG | HEART RATE: 78 BPM | TEMPERATURE: 98 F | DIASTOLIC BLOOD PRESSURE: 58 MMHG | SYSTOLIC BLOOD PRESSURE: 125 MMHG | RESPIRATION RATE: 18 BRPM

## 2021-01-01 VITALS — SYSTOLIC BLOOD PRESSURE: 149 MMHG | DIASTOLIC BLOOD PRESSURE: 77 MMHG | TEMPERATURE: 98 F | HEART RATE: 82 BPM

## 2021-01-01 VITALS
HEART RATE: 80 BPM | TEMPERATURE: 97 F | RESPIRATION RATE: 18 BRPM | SYSTOLIC BLOOD PRESSURE: 140 MMHG | DIASTOLIC BLOOD PRESSURE: 65 MMHG | TEMPERATURE: 98 F | HEART RATE: 88 BPM | DIASTOLIC BLOOD PRESSURE: 72 MMHG | SYSTOLIC BLOOD PRESSURE: 132 MMHG

## 2021-01-01 VITALS
HEIGHT: 70 IN | BODY MASS INDEX: 28.41 KG/M2 | TEMPERATURE: 97 F | OXYGEN SATURATION: 98 % | WEIGHT: 198.44 LBS | SYSTOLIC BLOOD PRESSURE: 97 MMHG | RESPIRATION RATE: 18 BRPM | HEART RATE: 72 BPM | DIASTOLIC BLOOD PRESSURE: 60 MMHG

## 2021-01-01 VITALS
HEART RATE: 88 BPM | DIASTOLIC BLOOD PRESSURE: 63 MMHG | SYSTOLIC BLOOD PRESSURE: 131 MMHG | TEMPERATURE: 98 F | OXYGEN SATURATION: 96 % | RESPIRATION RATE: 18 BRPM

## 2021-01-01 VITALS — SYSTOLIC BLOOD PRESSURE: 119 MMHG | DIASTOLIC BLOOD PRESSURE: 59 MMHG

## 2021-01-01 VITALS — DIASTOLIC BLOOD PRESSURE: 79 MMHG | SYSTOLIC BLOOD PRESSURE: 133 MMHG

## 2021-01-01 VITALS — DIASTOLIC BLOOD PRESSURE: 68 MMHG | SYSTOLIC BLOOD PRESSURE: 114 MMHG

## 2021-01-01 VITALS — DIASTOLIC BLOOD PRESSURE: 56 MMHG | SYSTOLIC BLOOD PRESSURE: 115 MMHG

## 2021-01-01 DIAGNOSIS — S91.302A WOUND OF LEFT FOOT: ICD-10-CM

## 2021-01-01 DIAGNOSIS — I96 DRY GANGRENE: ICD-10-CM

## 2021-01-01 DIAGNOSIS — I73.9 PVD (PERIPHERAL VASCULAR DISEASE): ICD-10-CM

## 2021-01-01 DIAGNOSIS — E11.621 TYPE 2 DIABETES MELLITUS WITH RIGHT DIABETIC FOOT ULCER: ICD-10-CM

## 2021-01-01 DIAGNOSIS — I70.239 ATHEROSCLEROSIS OF NATIVE ARTERY OF RIGHT LEG WITH ULCERATION: ICD-10-CM

## 2021-01-01 DIAGNOSIS — H40.51X1 NEOVASCULAR GLAUCOMA, RIGHT EYE, MILD STAGE: ICD-10-CM

## 2021-01-01 DIAGNOSIS — I70.233 ATHEROSCLEROSIS OF NATIVE ARTERY OF RIGHT LOWER EXTREMITY WITH ULCERATION OF ANKLE: ICD-10-CM

## 2021-01-01 DIAGNOSIS — R18.8 OTHER ASCITES: ICD-10-CM

## 2021-01-01 DIAGNOSIS — I70.244 ATHEROSCLEROSIS OF NATIVE ARTERY OF LEFT LOWER EXTREMITY WITH ULCERATION OF MIDFOOT: Primary | ICD-10-CM

## 2021-01-01 DIAGNOSIS — Z95.810 ICD (IMPLANTABLE CARDIOVERTER-DEFIBRILLATOR), SINGLE, IN SITU: ICD-10-CM

## 2021-01-01 DIAGNOSIS — L97.909 VENOUS STASIS ULCER WITH EDEMA OF LOWER LEG: ICD-10-CM

## 2021-01-01 DIAGNOSIS — I70.234 ATHEROSCLEROSIS OF NATIVE ARTERY OF RIGHT LOWER EXTREMITY WITH ULCERATION OF HEEL: Primary | ICD-10-CM

## 2021-01-01 DIAGNOSIS — I83.899 VENOUS STASIS ULCER WITH EDEMA OF LOWER LEG: ICD-10-CM

## 2021-01-01 DIAGNOSIS — R60.9 VENOUS STASIS ULCER WITH EDEMA OF LOWER LEG: ICD-10-CM

## 2021-01-01 DIAGNOSIS — I83.009 VENOUS STASIS ULCER WITH EDEMA OF LOWER LEG: ICD-10-CM

## 2021-01-01 DIAGNOSIS — T87.9 BKA STUMP COMPLICATION: Primary | ICD-10-CM

## 2021-01-01 DIAGNOSIS — L97.516 DIABETIC ULCER OF TOE OF RIGHT FOOT ASSOCIATED WITH TYPE 2 DIABETES MELLITUS, WITH BONE INVOLVEMENT WITHOUT EVIDENCE OF NECROSIS: ICD-10-CM

## 2021-01-01 DIAGNOSIS — H40.53X2 NEOVASCULAR GLAUCOMA OF BOTH EYES, MODERATE STAGE: Chronic | ICD-10-CM

## 2021-01-01 DIAGNOSIS — L97.529 TYPE 2 DIABETES MELLITUS WITH LEFT DIABETIC FOOT ULCER: ICD-10-CM

## 2021-01-01 DIAGNOSIS — T87.9 BKA STUMP COMPLICATION: ICD-10-CM

## 2021-01-01 DIAGNOSIS — N18.30 CKD STAGE 3 DUE TO TYPE 2 DIABETES MELLITUS: ICD-10-CM

## 2021-01-01 DIAGNOSIS — L97.519 TYPE 2 DIABETES MELLITUS WITH RIGHT DIABETIC FOOT ULCER: ICD-10-CM

## 2021-01-01 DIAGNOSIS — E11.621 DIABETIC ULCER OF TOE OF RIGHT FOOT ASSOCIATED WITH TYPE 2 DIABETES MELLITUS, WITH BONE INVOLVEMENT WITHOUT EVIDENCE OF NECROSIS: ICD-10-CM

## 2021-01-01 DIAGNOSIS — I25.5 ISCHEMIC CARDIOMYOPATHY: Primary | ICD-10-CM

## 2021-01-01 DIAGNOSIS — I70.244: ICD-10-CM

## 2021-01-01 DIAGNOSIS — Z96.89 HISTORY OF GLAUCOMA TUBE SHUNT PROCEDURE: ICD-10-CM

## 2021-01-01 DIAGNOSIS — E11.42 DM TYPE 2 WITH DIABETIC PERIPHERAL NEUROPATHY: ICD-10-CM

## 2021-01-01 DIAGNOSIS — R18.8 OTHER ASCITES: Primary | ICD-10-CM

## 2021-01-01 DIAGNOSIS — L97.516 DIABETIC ULCER OF TOE OF RIGHT FOOT ASSOCIATED WITH TYPE 2 DIABETES MELLITUS, WITH BONE INVOLVEMENT WITHOUT EVIDENCE OF NECROSIS: Primary | ICD-10-CM

## 2021-01-01 DIAGNOSIS — H40.52X3 NEOVASCULAR GLAUCOMA OF LEFT EYE, SEVERE STAGE: Primary | ICD-10-CM

## 2021-01-01 DIAGNOSIS — S91.301A OPEN WOUND OF RIGHT FOOT: ICD-10-CM

## 2021-01-01 DIAGNOSIS — E11.621 DIABETIC ULCER OF TOE OF RIGHT FOOT ASSOCIATED WITH TYPE 2 DIABETES MELLITUS, WITH BONE INVOLVEMENT WITHOUT EVIDENCE OF NECROSIS: Primary | ICD-10-CM

## 2021-01-01 DIAGNOSIS — Z89.511 HX OF BKA, RIGHT: Primary | ICD-10-CM

## 2021-01-01 DIAGNOSIS — I50.42 CHRONIC COMBINED SYSTOLIC AND DIASTOLIC HEART FAILURE: Primary | ICD-10-CM

## 2021-01-01 DIAGNOSIS — R60.9 VENOUS STASIS ULCER WITH EDEMA OF LOWER LEG: Primary | ICD-10-CM

## 2021-01-01 DIAGNOSIS — H35.373 EPIRETINAL MEMBRANE, BOTH EYES: ICD-10-CM

## 2021-01-01 DIAGNOSIS — L97.909 VENOUS STASIS ULCER WITH EDEMA OF LOWER LEG: Primary | ICD-10-CM

## 2021-01-01 DIAGNOSIS — I25.10 CORONARY ARTERY DISEASE INVOLVING NATIVE CORONARY ARTERY OF NATIVE HEART WITHOUT ANGINA PECTORIS: Primary | ICD-10-CM

## 2021-01-01 DIAGNOSIS — I83.009 VENOUS STASIS ULCER WITH EDEMA OF LOWER LEG: Primary | ICD-10-CM

## 2021-01-01 DIAGNOSIS — I25.5 ISCHEMIC CARDIOMYOPATHY: ICD-10-CM

## 2021-01-01 DIAGNOSIS — L97.529 TYPE 2 DIABETES MELLITUS WITH LEFT DIABETIC FOOT ULCER: Primary | ICD-10-CM

## 2021-01-01 DIAGNOSIS — S91.302A WOUND OF LEFT FOOT: Primary | ICD-10-CM

## 2021-01-01 DIAGNOSIS — I70.234 ATHEROSCLEROSIS OF NATIVE ARTERY OF RIGHT LOWER EXTREMITY WITH ULCERATION OF HEEL: ICD-10-CM

## 2021-01-01 DIAGNOSIS — E08.621 DIABETES MELLITUS DUE TO UNDERLYING CONDITION WITH FOOT ULCER, WITHOUT LONG-TERM CURRENT USE OF INSULIN: Primary | ICD-10-CM

## 2021-01-01 DIAGNOSIS — R20.2 PARESTHESIA: ICD-10-CM

## 2021-01-01 DIAGNOSIS — R18.8 ASCITES OF LIVER: Primary | ICD-10-CM

## 2021-01-01 DIAGNOSIS — Z01.818 PREOP TESTING: Primary | ICD-10-CM

## 2021-01-01 DIAGNOSIS — E11.42 DM TYPE 2 WITH DIABETIC PERIPHERAL NEUROPATHY: Primary | ICD-10-CM

## 2021-01-01 DIAGNOSIS — H21.1X1 RUBEOSIS IRIDIS, RIGHT: ICD-10-CM

## 2021-01-01 DIAGNOSIS — R06.02 SOB (SHORTNESS OF BREATH): ICD-10-CM

## 2021-01-01 DIAGNOSIS — R07.9 CHEST PAIN: ICD-10-CM

## 2021-01-01 DIAGNOSIS — I83.899 VENOUS STASIS ULCER WITH EDEMA OF LOWER LEG: Primary | ICD-10-CM

## 2021-01-01 DIAGNOSIS — L97.919 DIABETIC ULCER OF RIGHT LOWER LEG: ICD-10-CM

## 2021-01-01 DIAGNOSIS — E11.22 CKD STAGE 3 DUE TO TYPE 2 DIABETES MELLITUS: ICD-10-CM

## 2021-01-01 DIAGNOSIS — I73.9 PVD (PERIPHERAL VASCULAR DISEASE): Chronic | ICD-10-CM

## 2021-01-01 DIAGNOSIS — R06.02 SHORTNESS OF BREATH: ICD-10-CM

## 2021-01-01 DIAGNOSIS — H40.52X3 NEOVASCULAR GLAUCOMA OF LEFT EYE, SEVERE STAGE: ICD-10-CM

## 2021-01-01 DIAGNOSIS — E11.622 DIABETIC ULCER OF RIGHT LOWER LEG: ICD-10-CM

## 2021-01-01 DIAGNOSIS — L97.509 DIABETES MELLITUS DUE TO UNDERLYING CONDITION WITH FOOT ULCER, WITHOUT LONG-TERM CURRENT USE OF INSULIN: ICD-10-CM

## 2021-01-01 DIAGNOSIS — Z79.01 ANTICOAGULANT LONG-TERM USE: ICD-10-CM

## 2021-01-01 DIAGNOSIS — Z01.818 PREOP TESTING: ICD-10-CM

## 2021-01-01 DIAGNOSIS — N18.31 STAGE 3A CHRONIC KIDNEY DISEASE: ICD-10-CM

## 2021-01-01 DIAGNOSIS — R53.81 DEBILITY: ICD-10-CM

## 2021-01-01 DIAGNOSIS — Z11.52 ENCOUNTER FOR PREOPERATIVE SCREENING LABORATORY TESTING FOR COVID-19 VIRUS: Primary | ICD-10-CM

## 2021-01-01 DIAGNOSIS — Z01.812 ENCOUNTER FOR PREOPERATIVE SCREENING LABORATORY TESTING FOR COVID-19 VIRUS: Primary | ICD-10-CM

## 2021-01-01 DIAGNOSIS — Z96.1 PSEUDOPHAKIA: ICD-10-CM

## 2021-01-01 DIAGNOSIS — E08.621 DIABETES MELLITUS DUE TO UNDERLYING CONDITION WITH FOOT ULCER, WITHOUT LONG-TERM CURRENT USE OF INSULIN: ICD-10-CM

## 2021-01-01 DIAGNOSIS — Z98.890 S/P ABDOMINAL PARACENTESIS: ICD-10-CM

## 2021-01-01 DIAGNOSIS — S91.302A OPEN WOUND OF LEFT FOOT: ICD-10-CM

## 2021-01-01 DIAGNOSIS — I70.0 AORTIC ATHEROSCLEROSIS: ICD-10-CM

## 2021-01-01 DIAGNOSIS — E11.9 TYPE 2 DIABETES MELLITUS WITHOUT COMPLICATION: ICD-10-CM

## 2021-01-01 DIAGNOSIS — E11.621 TYPE 2 DIABETES MELLITUS WITH LEFT DIABETIC FOOT ULCER: Primary | ICD-10-CM

## 2021-01-01 DIAGNOSIS — Z78.9 STATIN INTOLERANCE: ICD-10-CM

## 2021-01-01 DIAGNOSIS — E11.621 TYPE 2 DIABETES MELLITUS WITH LEFT DIABETIC FOOT ULCER: ICD-10-CM

## 2021-01-01 DIAGNOSIS — I70.239 ATHEROSCLEROSIS OF NATIVE ARTERY OF RIGHT LEG WITH ULCERATION: Primary | ICD-10-CM

## 2021-01-01 DIAGNOSIS — I50.42 CHRONIC COMBINED SYSTOLIC AND DIASTOLIC HEART FAILURE: ICD-10-CM

## 2021-01-01 DIAGNOSIS — I96 GANGRENE: ICD-10-CM

## 2021-01-01 DIAGNOSIS — L97.509 DIABETES MELLITUS DUE TO UNDERLYING CONDITION WITH FOOT ULCER, WITHOUT LONG-TERM CURRENT USE OF INSULIN: Primary | ICD-10-CM

## 2021-01-01 DIAGNOSIS — I70.229 CRITICAL LIMB ISCHEMIA WITH HISTORY OF REVASCULARIZATION OF SAME EXTREMITY: ICD-10-CM

## 2021-01-01 DIAGNOSIS — E78.2 MIXED HYPERLIPIDEMIA: ICD-10-CM

## 2021-01-01 DIAGNOSIS — I10 ESSENTIAL HYPERTENSION: Chronic | ICD-10-CM

## 2021-01-01 DIAGNOSIS — Z98.890 CRITICAL LIMB ISCHEMIA WITH HISTORY OF REVASCULARIZATION OF SAME EXTREMITY: ICD-10-CM

## 2021-01-01 DIAGNOSIS — Z23 NEED FOR VACCINATION: Primary | ICD-10-CM

## 2021-01-01 LAB
ABO + RH BLD: NORMAL
ABO + RH BLD: NORMAL
ACID FAST MOD KINY STN SPEC: NORMAL
ALBUMIN SERPL BCP-MCNC: 1.7 G/DL (ref 3.5–5.2)
ALBUMIN SERPL BCP-MCNC: 1.9 G/DL (ref 3.5–5.2)
ALBUMIN SERPL BCP-MCNC: 2.8 G/DL (ref 3.5–5.2)
ALBUMIN SERPL BCP-MCNC: 2.9 G/DL (ref 3.5–5.2)
ALP SERPL-CCNC: 104 U/L (ref 55–135)
ALP SERPL-CCNC: 110 U/L (ref 55–135)
ALP SERPL-CCNC: 158 U/L (ref 55–135)
ALP SERPL-CCNC: 201 U/L (ref 55–135)
ALT SERPL W/O P-5'-P-CCNC: 12 U/L (ref 10–44)
ALT SERPL W/O P-5'-P-CCNC: 15 U/L (ref 10–44)
ALT SERPL W/O P-5'-P-CCNC: 21 U/L (ref 10–44)
ALT SERPL W/O P-5'-P-CCNC: 24 U/L (ref 10–44)
ANION GAP SERPL CALC-SCNC: 7 MMOL/L (ref 8–16)
ANION GAP SERPL CALC-SCNC: 7 MMOL/L (ref 8–16)
ANION GAP SERPL CALC-SCNC: 9 MMOL/L (ref 8–16)
ANION GAP SERPL CALC-SCNC: 9 MMOL/L (ref 8–16)
APTT BLDCRRT: 29.5 SEC (ref 21–32)
AST SERPL-CCNC: 17 U/L (ref 10–40)
AST SERPL-CCNC: 20 U/L (ref 10–40)
AST SERPL-CCNC: 24 U/L (ref 10–40)
AST SERPL-CCNC: 28 U/L (ref 10–40)
BACTERIA SPEC AEROBE CULT: ABNORMAL
BACTERIA SPEC AEROBE CULT: NO GROWTH
BACTERIA SPEC ANAEROBE CULT: NORMAL
BASOPHILS # BLD AUTO: 0.06 K/UL (ref 0–0.2)
BASOPHILS # BLD AUTO: 0.09 K/UL (ref 0–0.2)
BASOPHILS # BLD AUTO: 0.1 K/UL (ref 0–0.2)
BASOPHILS # BLD AUTO: 0.11 K/UL (ref 0–0.2)
BASOPHILS NFR BLD: 0.9 % (ref 0–1.9)
BASOPHILS NFR BLD: 1.3 % (ref 0–1.9)
BASOPHILS NFR BLD: 1.4 % (ref 0–1.9)
BASOPHILS NFR BLD: 2.1 % (ref 0–1.9)
BILIRUB SERPL-MCNC: 0.4 MG/DL (ref 0.1–1)
BILIRUB SERPL-MCNC: 0.5 MG/DL (ref 0.1–1)
BLD GP AB SCN CELLS X3 SERPL QL: NORMAL
BLD GP AB SCN CELLS X3 SERPL QL: NORMAL
BLD PROD TYP BPU: NORMAL
BLOOD UNIT EXPIRATION DATE: NORMAL
BLOOD UNIT TYPE CODE: 600
BLOOD UNIT TYPE: NORMAL
BNP SERPL-MCNC: 1708 PG/ML (ref 0–99)
BNP SERPL-MCNC: 2015 PG/ML (ref 0–99)
BUN SERPL-MCNC: 36 MG/DL (ref 8–23)
BUN SERPL-MCNC: 40 MG/DL (ref 8–23)
BUN SERPL-MCNC: 48 MG/DL (ref 8–23)
BUN SERPL-MCNC: 52 MG/DL (ref 8–23)
CALCIUM SERPL-MCNC: 7.5 MG/DL (ref 8.7–10.5)
CALCIUM SERPL-MCNC: 8 MG/DL (ref 8.7–10.5)
CALCIUM SERPL-MCNC: 9 MG/DL (ref 8.7–10.5)
CALCIUM SERPL-MCNC: 9.2 MG/DL (ref 8.7–10.5)
CHLORIDE SERPL-SCNC: 101 MMOL/L (ref 95–110)
CHLORIDE SERPL-SCNC: 104 MMOL/L (ref 95–110)
CHLORIDE SERPL-SCNC: 106 MMOL/L (ref 95–110)
CHLORIDE SERPL-SCNC: 108 MMOL/L (ref 95–110)
CO2 SERPL-SCNC: 24 MMOL/L (ref 23–29)
CO2 SERPL-SCNC: 25 MMOL/L (ref 23–29)
CO2 SERPL-SCNC: 26 MMOL/L (ref 23–29)
CO2 SERPL-SCNC: 27 MMOL/L (ref 23–29)
CODING SYSTEM: NORMAL
CREAT SERPL-MCNC: 1.2 MG/DL (ref 0.5–1.4)
CREAT SERPL-MCNC: 1.3 MG/DL (ref 0.5–1.4)
CREAT SERPL-MCNC: 1.3 MG/DL (ref 0.5–1.4)
CREAT SERPL-MCNC: 1.8 MG/DL (ref 0.5–1.4)
CTP QC/QA: YES
CTP QC/QA: YES
DIFFERENTIAL METHOD: ABNORMAL
DISPENSE STATUS: NORMAL
EOSINOPHIL # BLD AUTO: 0.4 K/UL (ref 0–0.5)
EOSINOPHIL # BLD AUTO: 0.6 K/UL (ref 0–0.5)
EOSINOPHIL NFR BLD: 4.8 % (ref 0–8)
EOSINOPHIL NFR BLD: 5.4 % (ref 0–8)
EOSINOPHIL NFR BLD: 8.4 % (ref 0–8)
EOSINOPHIL NFR BLD: 9.8 % (ref 0–8)
ERYTHROCYTE [DISTWIDTH] IN BLOOD BY AUTOMATED COUNT: 20.5 % (ref 11.5–14.5)
ERYTHROCYTE [DISTWIDTH] IN BLOOD BY AUTOMATED COUNT: 20.6 % (ref 11.5–14.5)
ERYTHROCYTE [DISTWIDTH] IN BLOOD BY AUTOMATED COUNT: 21.1 % (ref 11.5–14.5)
ERYTHROCYTE [DISTWIDTH] IN BLOOD BY AUTOMATED COUNT: 21.2 % (ref 11.5–14.5)
EST. GFR  (AFRICAN AMERICAN): 45 ML/MIN/1.73 M^2
EST. GFR  (AFRICAN AMERICAN): >60 ML/MIN/1.73 M^2
EST. GFR  (NON AFRICAN AMERICAN): 39 ML/MIN/1.73 M^2
EST. GFR  (NON AFRICAN AMERICAN): 58 ML/MIN/1.73 M^2
EST. GFR  (NON AFRICAN AMERICAN): 58 ML/MIN/1.73 M^2
EST. GFR  (NON AFRICAN AMERICAN): >60 ML/MIN/1.73 M^2
ESTIMATED AVG GLUCOSE: 143 MG/DL (ref 68–131)
FINAL PATHOLOGIC DIAGNOSIS: NORMAL
FUNGUS SPEC CULT: NORMAL
GLUCOSE SERPL-MCNC: 126 MG/DL (ref 70–110)
GLUCOSE SERPL-MCNC: 141 MG/DL (ref 70–110)
GLUCOSE SERPL-MCNC: 181 MG/DL (ref 70–110)
GLUCOSE SERPL-MCNC: 94 MG/DL (ref 70–110)
GRAM STN SPEC: NORMAL
GRAM STN SPEC: NORMAL
GROSS: NORMAL
HBA1C MFR BLD: 6.6 % (ref 4–5.6)
HCT VFR BLD AUTO: 29.9 % (ref 40–54)
HCT VFR BLD AUTO: 33.7 % (ref 40–54)
HCT VFR BLD AUTO: 40.1 % (ref 40–54)
HCT VFR BLD AUTO: 42.4 % (ref 40–54)
HGB BLD-MCNC: 10.6 G/DL (ref 14–18)
HGB BLD-MCNC: 12.9 G/DL (ref 14–18)
HGB BLD-MCNC: 13.5 G/DL (ref 14–18)
HGB BLD-MCNC: 9.3 G/DL (ref 14–18)
IMM GRANULOCYTES # BLD AUTO: 0.01 K/UL (ref 0–0.04)
IMM GRANULOCYTES # BLD AUTO: 0.01 K/UL (ref 0–0.04)
IMM GRANULOCYTES # BLD AUTO: 0.04 K/UL (ref 0–0.04)
IMM GRANULOCYTES # BLD AUTO: 0.05 K/UL (ref 0–0.04)
IMM GRANULOCYTES NFR BLD AUTO: 0.2 % (ref 0–0.5)
IMM GRANULOCYTES NFR BLD AUTO: 0.2 % (ref 0–0.5)
IMM GRANULOCYTES NFR BLD AUTO: 0.4 % (ref 0–0.5)
IMM GRANULOCYTES NFR BLD AUTO: 0.6 % (ref 0–0.5)
INR PPP: 1 (ref 0.8–1.2)
INR PPP: 1.1 (ref 0.8–1.2)
LEFT ABI: 2.28
LEFT ANT TIBIAL SYS PSV: 74 CM/S
LEFT ARM BP: 107 MMHG
LEFT CFA PSV: 132 CM/S
LEFT DORSALIS PEDIS: 255 MMHG
LEFT EXTERNAL ILIAC PSV: 101 CM/S
LEFT PERONEAL SYS PSV: 118 CM/S
LEFT POPLITEAL PSV: 74 CM/S
LEFT POST TIBIAL SYS PSV: 43 CM/S
LEFT POSTERIOR TIBIAL: 53 MMHG
LEFT PROFUNDA SYS PSV: 93 CM/S
LEFT SUPER FEMORAL DIST SYS PSV: 83 CM/S
LEFT SUPER FEMORAL MID SYS PSV: 93 CM/S
LEFT SUPER FEMORAL OSTIAL SYS PSV: 90 CM/S
LEFT SUPER FEMORAL PROX SYS PSV: 66 CM/S
LEFT TIB/PER TRUNK SYS PSV: 121 CM/S
LYMPHOCYTES # BLD AUTO: 0.3 K/UL (ref 1–4.8)
LYMPHOCYTES # BLD AUTO: 0.4 K/UL (ref 1–4.8)
LYMPHOCYTES # BLD AUTO: 0.4 K/UL (ref 1–4.8)
LYMPHOCYTES # BLD AUTO: 0.5 K/UL (ref 1–4.8)
LYMPHOCYTES NFR BLD: 3.8 % (ref 18–48)
LYMPHOCYTES NFR BLD: 5.5 % (ref 18–48)
LYMPHOCYTES NFR BLD: 7.5 % (ref 18–48)
LYMPHOCYTES NFR BLD: 8.6 % (ref 18–48)
Lab: NORMAL
MCH RBC QN AUTO: 26.4 PG (ref 27–31)
MCH RBC QN AUTO: 26.8 PG (ref 27–31)
MCH RBC QN AUTO: 28.3 PG (ref 27–31)
MCH RBC QN AUTO: 28.9 PG (ref 27–31)
MCHC RBC AUTO-ENTMCNC: 31.1 G/DL (ref 32–36)
MCHC RBC AUTO-ENTMCNC: 31.5 G/DL (ref 32–36)
MCHC RBC AUTO-ENTMCNC: 31.8 G/DL (ref 32–36)
MCHC RBC AUTO-ENTMCNC: 32.2 G/DL (ref 32–36)
MCV RBC AUTO: 84 FL (ref 82–98)
MCV RBC AUTO: 86 FL (ref 82–98)
MCV RBC AUTO: 89 FL (ref 82–98)
MCV RBC AUTO: 90 FL (ref 82–98)
MONOCYTES # BLD AUTO: 0.5 K/UL (ref 0.3–1)
MONOCYTES # BLD AUTO: 0.6 K/UL (ref 0.3–1)
MONOCYTES # BLD AUTO: 0.8 K/UL (ref 0.3–1)
MONOCYTES # BLD AUTO: 1 K/UL (ref 0.3–1)
MONOCYTES NFR BLD: 10.7 % (ref 4–15)
MONOCYTES NFR BLD: 13.7 % (ref 4–15)
MONOCYTES NFR BLD: 8.9 % (ref 4–15)
MONOCYTES NFR BLD: 9.6 % (ref 4–15)
MYCOBACTERIUM SPEC QL CULT: NORMAL
NEUTROPHILS # BLD AUTO: 2.8 K/UL (ref 1.8–7.7)
NEUTROPHILS # BLD AUTO: 3.1 K/UL (ref 1.8–7.7)
NEUTROPHILS # BLD AUTO: 6.7 K/UL (ref 1.8–7.7)
NEUTROPHILS # BLD AUTO: 8.9 K/UL (ref 1.8–7.7)
NEUTROPHILS NFR BLD: 65.6 % (ref 38–73)
NEUTROPHILS NFR BLD: 71.8 % (ref 38–73)
NEUTROPHILS NFR BLD: 78.2 % (ref 38–73)
NEUTROPHILS NFR BLD: 80.6 % (ref 38–73)
NRBC BLD-RTO: 0 /100 WBC
OHS CV LEFT LOWER EXTREMITY ABI (NO CALC): 0.47
OHS CV RIGHT ABI LOWER EXTREMITY (NO CALC): 0.63
PLATELET # BLD AUTO: 235 K/UL (ref 150–450)
PLATELET # BLD AUTO: 245 K/UL (ref 150–450)
PLATELET # BLD AUTO: 314 K/UL (ref 150–450)
PLATELET # BLD AUTO: 334 K/UL (ref 150–450)
PMV BLD AUTO: 8.8 FL (ref 9.2–12.9)
PMV BLD AUTO: 9.1 FL (ref 9.2–12.9)
PMV BLD AUTO: 9.5 FL (ref 9.2–12.9)
PMV BLD AUTO: 9.8 FL (ref 9.2–12.9)
POCT GLUCOSE: 121 MG/DL (ref 70–110)
POCT GLUCOSE: 122 MG/DL (ref 70–110)
POCT GLUCOSE: 129 MG/DL (ref 70–110)
POCT GLUCOSE: 170 MG/DL (ref 70–110)
POCT GLUCOSE: 200 MG/DL (ref 70–110)
POCT GLUCOSE: 285 MG/DL (ref 70–110)
POTASSIUM SERPL-SCNC: 3.4 MMOL/L (ref 3.5–5.1)
POTASSIUM SERPL-SCNC: 3.9 MMOL/L (ref 3.5–5.1)
POTASSIUM SERPL-SCNC: 4.1 MMOL/L (ref 3.5–5.1)
POTASSIUM SERPL-SCNC: 4.4 MMOL/L (ref 3.5–5.1)
PROT SERPL-MCNC: 5 G/DL (ref 6–8.4)
PROT SERPL-MCNC: 5.9 G/DL (ref 6–8.4)
PROT SERPL-MCNC: 5.9 G/DL (ref 6–8.4)
PROT SERPL-MCNC: 6.3 G/DL (ref 6–8.4)
PROTHROMBIN TIME: 10.4 SEC (ref 9–12.5)
PROTHROMBIN TIME: 11.2 SEC (ref 9–12.5)
RBC # BLD AUTO: 3.47 M/UL (ref 4.6–6.2)
RBC # BLD AUTO: 4.01 M/UL (ref 4.6–6.2)
RBC # BLD AUTO: 4.47 M/UL (ref 4.6–6.2)
RBC # BLD AUTO: 4.77 M/UL (ref 4.6–6.2)
RIGHT ABI: 2.28
RIGHT ANT TIBIAL SYS PSV: 55 CM/S
RIGHT ARM BP: 112 MMHG
RIGHT CFA PSV: 79 CM/S
RIGHT DORSALIS PEDIS: 255 MMHG
RIGHT EXTERNAL ILLIAC PSV: 54 CM/S
RIGHT PERONEAL SYS PSV: 100 CM/S
RIGHT POPLITEAL PSV: 109 CM/S
RIGHT POST TIBIAL SYS PSV: 47 CM/S
RIGHT POSTERIOR TIBIAL: 71 MMHG
RIGHT PROFUNDA SYS PSV: 86 CM/S
RIGHT SUPER FEMORAL DIST SYS PSV: 84 CM/S
RIGHT SUPER FEMORAL MID SYS PSV: 92 CM/S
RIGHT SUPER FEMORAL OSTIAL SYS PSV: 80 CM/S
RIGHT SUPER FEMORAL PROX SYS PSV: 76 CM/S
RIGHT TIB/PER TRUNK SYS PSV: 114 CM/S
SARS-COV-2 RDRP RESP QL NAA+PROBE: NEGATIVE
SODIUM SERPL-SCNC: 135 MMOL/L (ref 136–145)
SODIUM SERPL-SCNC: 137 MMOL/L (ref 136–145)
SODIUM SERPL-SCNC: 140 MMOL/L (ref 136–145)
SODIUM SERPL-SCNC: 141 MMOL/L (ref 136–145)
TRANS ERYTHROCYTES VOL PATIENT: NORMAL ML
TROPONIN I SERPL DL<=0.01 NG/ML-MCNC: <0.006 NG/ML (ref 0–0.03)
TROPONIN I SERPL DL<=0.01 NG/ML-MCNC: <0.006 NG/ML (ref 0–0.03)
WBC # BLD AUTO: 10.99 K/UL (ref 3.9–12.7)
WBC # BLD AUTO: 4.28 K/UL (ref 3.9–12.7)
WBC # BLD AUTO: 4.3 K/UL (ref 3.9–12.7)
WBC # BLD AUTO: 8.57 K/UL (ref 3.9–12.7)

## 2021-01-01 PROCEDURE — P9047 ALBUMIN (HUMAN), 25%, 50ML: HCPCS | Mod: JG | Performed by: RADIOLOGY

## 2021-01-01 PROCEDURE — C1729 CATH, DRAINAGE: HCPCS

## 2021-01-01 PROCEDURE — 25000003 PHARM REV CODE 250: Performed by: RADIOLOGY

## 2021-01-01 PROCEDURE — 49083 IR PARACENTESIS WITH IMAGING: ICD-10-PCS | Mod: ,,, | Performed by: RADIOLOGY

## 2021-01-01 PROCEDURE — 84484 ASSAY OF TROPONIN QUANT: CPT | Performed by: EMERGENCY MEDICINE

## 2021-01-01 PROCEDURE — 88311 PR  DECALCIFY TISSUE: ICD-10-PCS | Mod: 26,,, | Performed by: PATHOLOGY

## 2021-01-01 PROCEDURE — 49083 ABD PARACENTESIS W/IMAGING: CPT | Performed by: RADIOLOGY

## 2021-01-01 PROCEDURE — 63600175 PHARM REV CODE 636 W HCPCS: Mod: JG | Performed by: RADIOLOGY

## 2021-01-01 PROCEDURE — 86900 BLOOD TYPING SEROLOGIC ABO: CPT | Performed by: NURSE PRACTITIONER

## 2021-01-01 PROCEDURE — 99999 PR PBB SHADOW E&M-EST. PATIENT-LVL IV: CPT | Mod: PBBFAC,,, | Performed by: INTERNAL MEDICINE

## 2021-01-01 PROCEDURE — 99214 OFFICE O/P EST MOD 30 MIN: CPT | Performed by: FAMILY MEDICINE

## 2021-01-01 PROCEDURE — 85610 PROTHROMBIN TIME: CPT | Performed by: EMERGENCY MEDICINE

## 2021-01-01 PROCEDURE — 63600175 PHARM REV CODE 636 W HCPCS: Mod: JG | Performed by: STUDENT IN AN ORGANIZED HEALTH CARE EDUCATION/TRAINING PROGRAM

## 2021-01-01 PROCEDURE — 99214 OFFICE O/P EST MOD 30 MIN: CPT | Mod: ,,, | Performed by: FAMILY MEDICINE

## 2021-01-01 PROCEDURE — 99214 PR OFFICE/OUTPT VISIT, EST, LEVL IV, 30-39 MIN: ICD-10-PCS | Mod: ,,, | Performed by: FAMILY MEDICINE

## 2021-01-01 PROCEDURE — 85025 COMPLETE CBC W/AUTO DIFF WBC: CPT | Performed by: INTERNAL MEDICINE

## 2021-01-01 PROCEDURE — 80053 COMPREHEN METABOLIC PANEL: CPT | Performed by: INTERNAL MEDICINE

## 2021-01-01 PROCEDURE — 85730 THROMBOPLASTIN TIME PARTIAL: CPT | Performed by: SURGERY

## 2021-01-01 PROCEDURE — 25000003 PHARM REV CODE 250: Performed by: ANESTHESIOLOGY

## 2021-01-01 PROCEDURE — 97110 THERAPEUTIC EXERCISES: CPT

## 2021-01-01 PROCEDURE — 67028 PR INJECT INTRAVITREAL PHARMCOLOGIC: ICD-10-PCS | Mod: S$PBB,RT,, | Performed by: OPHTHALMOLOGY

## 2021-01-01 PROCEDURE — 99024 POSTOP FOLLOW-UP VISIT: CPT | Mod: S$PBB,,, | Performed by: SURGERY

## 2021-01-01 PROCEDURE — A7048 VACUUM DRAIN BOTTLE/TUBE KIT: HCPCS

## 2021-01-01 PROCEDURE — 88307 PR  SURG PATH,LEVEL V: ICD-10-PCS | Mod: 26,,, | Performed by: PATHOLOGY

## 2021-01-01 PROCEDURE — 0002A COVID-19, MRNA, LNP-S, PF, 30 MCG/0.3 ML DOSE VACCINE: CPT | Mod: PBBFAC

## 2021-01-01 PROCEDURE — C9399 UNCLASSIFIED DRUGS OR BIOLOG: HCPCS | Performed by: INTERNAL MEDICINE

## 2021-01-01 PROCEDURE — 11000001 HC ACUTE MED/SURG PRIVATE ROOM

## 2021-01-01 PROCEDURE — 99999 PR PBB SHADOW E&M-EST. PATIENT-LVL III: ICD-10-PCS | Mod: PBBFAC,,, | Performed by: OPHTHALMOLOGY

## 2021-01-01 PROCEDURE — 86900 BLOOD TYPING SEROLOGIC ABO: CPT | Performed by: INTERNAL MEDICINE

## 2021-01-01 PROCEDURE — 96365 THER/PROPH/DIAG IV INF INIT: CPT

## 2021-01-01 PROCEDURE — 99215 OFFICE O/P EST HI 40 MIN: CPT | Performed by: FAMILY MEDICINE

## 2021-01-01 PROCEDURE — 99285 EMERGENCY DEPT VISIT HI MDM: CPT | Mod: 25

## 2021-01-01 PROCEDURE — 67028 INJECTION EYE DRUG: CPT | Mod: PBBFAC,RT | Performed by: OPHTHALMOLOGY

## 2021-01-01 PROCEDURE — 25000003 PHARM REV CODE 250: Performed by: STUDENT IN AN ORGANIZED HEALTH CARE EDUCATION/TRAINING PROGRAM

## 2021-01-01 PROCEDURE — 91300 COVID-19, MRNA, LNP-S, PF, 30 MCG/0.3 ML DOSE VACCINE: CPT | Mod: PBBFAC

## 2021-01-01 PROCEDURE — 99214 OFFICE O/P EST MOD 30 MIN: CPT | Mod: PBBFAC | Performed by: INTERNAL MEDICINE

## 2021-01-01 PROCEDURE — 99213 OFFICE O/P EST LOW 20 MIN: CPT | Performed by: FAMILY MEDICINE

## 2021-01-01 PROCEDURE — 63600175 PHARM REV CODE 636 W HCPCS: Mod: JW,JG | Performed by: RADIOLOGY

## 2021-01-01 PROCEDURE — 87077 CULTURE AEROBIC IDENTIFY: CPT | Performed by: FAMILY MEDICINE

## 2021-01-01 PROCEDURE — 76937 US GUIDE VASCULAR ACCESS: CPT | Mod: 26,,, | Performed by: ANESTHESIOLOGY

## 2021-01-01 PROCEDURE — C1751 CATH, INF, PER/CENT/MIDLINE: HCPCS | Performed by: ANESTHESIOLOGY

## 2021-01-01 PROCEDURE — 93925 CV US DOPPLER ARTERIAL LEGS BILATERAL (CUPID ONLY): ICD-10-PCS | Mod: 26,,, | Performed by: SURGERY

## 2021-01-01 PROCEDURE — 37000009 HC ANESTHESIA EA ADD 15 MINS: Performed by: ORTHOPAEDIC SURGERY

## 2021-01-01 PROCEDURE — 99214 OFFICE O/P EST MOD 30 MIN: CPT | Mod: S$PBB,,, | Performed by: INTERNAL MEDICINE

## 2021-01-01 PROCEDURE — 76937 PR  US GUIDE, VASCULAR ACCESS: ICD-10-PCS | Mod: 26,,, | Performed by: ANESTHESIOLOGY

## 2021-01-01 PROCEDURE — 63600175 PHARM REV CODE 636 W HCPCS: Performed by: ANESTHESIOLOGY

## 2021-01-01 PROCEDURE — 88311 DECALCIFY TISSUE: CPT | Performed by: PATHOLOGY

## 2021-01-01 PROCEDURE — 93925 LOWER EXTREMITY STUDY: CPT | Mod: 26,,, | Performed by: SURGERY

## 2021-01-01 PROCEDURE — P9047 ALBUMIN (HUMAN), 25%, 50ML: HCPCS | Mod: JG | Performed by: STUDENT IN AN ORGANIZED HEALTH CARE EDUCATION/TRAINING PROGRAM

## 2021-01-01 PROCEDURE — 63600175 PHARM REV CODE 636 W HCPCS: Performed by: ORTHOPAEDIC SURGERY

## 2021-01-01 PROCEDURE — 11042 DBRDMT SUBQ TIS 1ST 20SQCM/<: CPT | Mod: ,,, | Performed by: FAMILY MEDICINE

## 2021-01-01 PROCEDURE — 11045 DBRDMT SUBQ TISS EACH ADDL: CPT | Mod: ,,, | Performed by: FAMILY MEDICINE

## 2021-01-01 PROCEDURE — 67028 INJECTION EYE DRUG: CPT | Mod: S$PBB,RT,, | Performed by: OPHTHALMOLOGY

## 2021-01-01 PROCEDURE — 92012 PR EYE EXAM, EST PATIENT,INTERMED: ICD-10-PCS | Mod: S$PBB,,, | Performed by: OPHTHALMOLOGY

## 2021-01-01 PROCEDURE — 94761 N-INVAS EAR/PLS OXIMETRY MLT: CPT

## 2021-01-01 PROCEDURE — 99214 PR OFFICE/OUTPT VISIT, EST, LEVL IV, 30-39 MIN: ICD-10-PCS | Mod: S$PBB,,, | Performed by: INTERNAL MEDICINE

## 2021-01-01 PROCEDURE — 36415 COLL VENOUS BLD VENIPUNCTURE: CPT | Performed by: INTERNAL MEDICINE

## 2021-01-01 PROCEDURE — 99214 OFFICE O/P EST MOD 30 MIN: CPT | Mod: 25,,, | Performed by: FAMILY MEDICINE

## 2021-01-01 PROCEDURE — 85025 COMPLETE CBC W/AUTO DIFF WBC: CPT | Performed by: EMERGENCY MEDICINE

## 2021-01-01 PROCEDURE — 92012 INTRM OPH EXAM EST PATIENT: CPT | Mod: S$PBB,,, | Performed by: OPHTHALMOLOGY

## 2021-01-01 PROCEDURE — A4215 STERILE NEEDLE: HCPCS

## 2021-01-01 PROCEDURE — 99999 PR PBB SHADOW E&M-EST. PATIENT-LVL III: CPT | Mod: PBBFAC,,, | Performed by: OPHTHALMOLOGY

## 2021-01-01 PROCEDURE — 80053 COMPREHEN METABOLIC PANEL: CPT | Performed by: EMERGENCY MEDICINE

## 2021-01-01 PROCEDURE — 99215 OFFICE O/P EST HI 40 MIN: CPT | Mod: PBBFAC | Performed by: SURGERY

## 2021-01-01 PROCEDURE — 49083 ABD PARACENTESIS W/IMAGING: CPT

## 2021-01-01 PROCEDURE — 49083 ABD PARACENTESIS W/IMAGING: CPT | Performed by: STUDENT IN AN ORGANIZED HEALTH CARE EDUCATION/TRAINING PROGRAM

## 2021-01-01 PROCEDURE — 92014 PR EYE EXAM, EST PATIENT,COMPREHESV: ICD-10-PCS | Mod: 25,S$PBB,, | Performed by: OPHTHALMOLOGY

## 2021-01-01 PROCEDURE — 25000003 PHARM REV CODE 250: Performed by: ORTHOPAEDIC SURGERY

## 2021-01-01 PROCEDURE — 49082 ABD PARACENTESIS: CPT | Performed by: RADIOLOGY

## 2021-01-01 PROCEDURE — 99999 PR PBB SHADOW E&M-EST. PATIENT-LVL V: ICD-10-PCS | Mod: PBBFAC,,, | Performed by: INTERNAL MEDICINE

## 2021-01-01 PROCEDURE — 93922 UPR/L XTREMITY ART 2 LEVELS: CPT | Mod: TC

## 2021-01-01 PROCEDURE — 49083 IR PARACENTESIS NO IMAGING: ICD-10-PCS | Mod: ,,, | Performed by: RADIOLOGY

## 2021-01-01 PROCEDURE — 99999 PR PBB SHADOW E&M-EST. PATIENT-LVL V: CPT | Mod: PBBFAC,,, | Performed by: INTERNAL MEDICINE

## 2021-01-01 PROCEDURE — 85025 COMPLETE CBC W/AUTO DIFF WBC: CPT | Performed by: SURGERY

## 2021-01-01 PROCEDURE — 88307 TISSUE EXAM BY PATHOLOGIST: CPT | Performed by: PATHOLOGY

## 2021-01-01 PROCEDURE — 37000008 HC ANESTHESIA 1ST 15 MINUTES: Performed by: ORTHOPAEDIC SURGERY

## 2021-01-01 PROCEDURE — 99213 OFFICE O/P EST LOW 20 MIN: CPT | Mod: PBBFAC | Performed by: OPHTHALMOLOGY

## 2021-01-01 PROCEDURE — 99999 PR PBB SHADOW E&M-EST. PATIENT-LVL V: CPT | Mod: PBBFAC,,, | Performed by: NURSE PRACTITIONER

## 2021-01-01 PROCEDURE — 11045 DEBRIDEMENT: ICD-10-PCS | Mod: ,,, | Performed by: FAMILY MEDICINE

## 2021-01-01 PROCEDURE — 36415 COLL VENOUS BLD VENIPUNCTURE: CPT | Performed by: NURSE PRACTITIONER

## 2021-01-01 PROCEDURE — D9220A PRA ANESTHESIA: ICD-10-PCS | Mod: CRNA,,, | Performed by: STUDENT IN AN ORGANIZED HEALTH CARE EDUCATION/TRAINING PROGRAM

## 2021-01-01 PROCEDURE — 99215 OFFICE O/P EST HI 40 MIN: CPT | Mod: PBBFAC,PN | Performed by: NURSE PRACTITIONER

## 2021-01-01 PROCEDURE — 99214 PR OFFICE/OUTPT VISIT, EST, LEVL IV, 30-39 MIN: ICD-10-PCS | Mod: 25,,, | Performed by: FAMILY MEDICINE

## 2021-01-01 PROCEDURE — 99215 PR OFFICE/OUTPT VISIT, EST, LEVL V, 40-54 MIN: ICD-10-PCS | Mod: S$PBB,24,, | Performed by: SURGERY

## 2021-01-01 PROCEDURE — 85610 PROTHROMBIN TIME: CPT | Performed by: SURGERY

## 2021-01-01 PROCEDURE — 99215 OFFICE O/P EST HI 40 MIN: CPT | Mod: S$PBB,24,, | Performed by: SURGERY

## 2021-01-01 PROCEDURE — D9220A PRA ANESTHESIA: Mod: CRNA,,, | Performed by: STUDENT IN AN ORGANIZED HEALTH CARE EDUCATION/TRAINING PROGRAM

## 2021-01-01 PROCEDURE — 87070 CULTURE OTHR SPECIMN AEROBIC: CPT | Performed by: FAMILY MEDICINE

## 2021-01-01 PROCEDURE — 11042 DBRDMT SUBQ TIS 1ST 20SQCM/<: CPT | Performed by: FAMILY MEDICINE

## 2021-01-01 PROCEDURE — 99999 PR PBB SHADOW E&M-EST. PATIENT-LVL IV: CPT | Mod: PBBFAC,,, | Performed by: SURGERY

## 2021-01-01 PROCEDURE — 71000039 HC RECOVERY, EACH ADD'L HOUR: Performed by: ORTHOPAEDIC SURGERY

## 2021-01-01 PROCEDURE — 82962 GLUCOSE BLOOD TEST: CPT | Performed by: ORTHOPAEDIC SURGERY

## 2021-01-01 PROCEDURE — U0002 COVID-19 LAB TEST NON-CDC: HCPCS | Performed by: RADIOLOGY

## 2021-01-01 PROCEDURE — 87102 FUNGUS ISOLATION CULTURE: CPT | Performed by: ORTHOPAEDIC SURGERY

## 2021-01-01 PROCEDURE — 99215 OFFICE O/P EST HI 40 MIN: CPT | Mod: PBBFAC | Performed by: INTERNAL MEDICINE

## 2021-01-01 PROCEDURE — 27201912 HC WOUND CARE DEBRIDEMENT SUPPLIES: Performed by: FAMILY MEDICINE

## 2021-01-01 PROCEDURE — 97530 THERAPEUTIC ACTIVITIES: CPT

## 2021-01-01 PROCEDURE — 36000710: Performed by: ORTHOPAEDIC SURGERY

## 2021-01-01 PROCEDURE — 97607 NEG PRS WND THR NDME<=50SQCM: CPT

## 2021-01-01 PROCEDURE — 97161 PT EVAL LOW COMPLEX 20 MIN: CPT

## 2021-01-01 PROCEDURE — 93922 ANKLE BRACHIAL INDICES (ABI): ICD-10-PCS | Mod: 26,,, | Performed by: SURGERY

## 2021-01-01 PROCEDURE — 99213 OFFICE O/P EST LOW 20 MIN: CPT | Mod: PBBFAC,25 | Performed by: OPHTHALMOLOGY

## 2021-01-01 PROCEDURE — D9220A PRA ANESTHESIA: Mod: ANES,,, | Performed by: ANESTHESIOLOGY

## 2021-01-01 PROCEDURE — 97542 WHEELCHAIR MNGMENT TRAINING: CPT

## 2021-01-01 PROCEDURE — 93922 UPR/L XTREMITY ART 2 LEVELS: CPT | Mod: 26,,, | Performed by: RADIOLOGY

## 2021-01-01 PROCEDURE — 93925 LOWER EXTREMITY STUDY: CPT

## 2021-01-01 PROCEDURE — 86920 COMPATIBILITY TEST SPIN: CPT | Performed by: ANESTHESIOLOGY

## 2021-01-01 PROCEDURE — A4216 STERILE WATER/SALINE, 10 ML: HCPCS | Performed by: HOSPITALIST

## 2021-01-01 PROCEDURE — G0179 PR HOME HEALTH MD RECERTIFICATION: ICD-10-PCS | Mod: ,,, | Performed by: FAMILY MEDICINE

## 2021-01-01 PROCEDURE — 99214 OFFICE O/P EST MOD 30 MIN: CPT | Mod: S$PBB,,, | Performed by: NURSE PRACTITIONER

## 2021-01-01 PROCEDURE — 92134 POSTERIOR SEGMENT OCT RETINA (OCULAR COHERENCE TOMOGRAPHY)-BOTH EYES: ICD-10-PCS | Mod: 26,S$PBB,, | Performed by: OPHTHALMOLOGY

## 2021-01-01 PROCEDURE — 87205 SMEAR GRAM STAIN: CPT | Performed by: ORTHOPAEDIC SURGERY

## 2021-01-01 PROCEDURE — 83880 ASSAY OF NATRIURETIC PEPTIDE: CPT | Performed by: EMERGENCY MEDICINE

## 2021-01-01 PROCEDURE — 83036 HEMOGLOBIN GLYCOSYLATED A1C: CPT | Performed by: NURSE PRACTITIONER

## 2021-01-01 PROCEDURE — 93922 US ANKLE/BRACH INDICES EXT LTD W/O STR 1-2 LEVELS: ICD-10-PCS | Mod: 26,,, | Performed by: RADIOLOGY

## 2021-01-01 PROCEDURE — 99999 PR PBB SHADOW E&M-EST. PATIENT-LVL IV: ICD-10-PCS | Mod: PBBFAC,,, | Performed by: INTERNAL MEDICINE

## 2021-01-01 PROCEDURE — G0180 MD CERTIFICATION HHA PATIENT: HCPCS | Mod: ,,, | Performed by: FAMILY MEDICINE

## 2021-01-01 PROCEDURE — 87070 CULTURE OTHR SPECIMN AEROBIC: CPT | Performed by: ORTHOPAEDIC SURGERY

## 2021-01-01 PROCEDURE — 36415 COLL VENOUS BLD VENIPUNCTURE: CPT | Performed by: SURGERY

## 2021-01-01 PROCEDURE — 49083 IR PARACENTESIS WITH IMAGING: ICD-10-PCS | Mod: ,,, | Performed by: STUDENT IN AN ORGANIZED HEALTH CARE EDUCATION/TRAINING PROGRAM

## 2021-01-01 PROCEDURE — 11042 DEBRIDEMENT: ICD-10-PCS | Mod: ,,, | Performed by: FAMILY MEDICINE

## 2021-01-01 PROCEDURE — G0179 MD RECERTIFICATION HHA PT: HCPCS | Mod: ,,, | Performed by: FAMILY MEDICINE

## 2021-01-01 PROCEDURE — 88311 DECALCIFY TISSUE: CPT | Mod: 26,,, | Performed by: PATHOLOGY

## 2021-01-01 PROCEDURE — 99214 OFFICE O/P EST MOD 30 MIN: CPT

## 2021-01-01 PROCEDURE — 36620 INSERTION CATHETER ARTERY: CPT | Mod: 59,,, | Performed by: ANESTHESIOLOGY

## 2021-01-01 PROCEDURE — 49083 ABD PARACENTESIS W/IMAGING: CPT | Mod: RT,,, | Performed by: RADIOLOGY

## 2021-01-01 PROCEDURE — 25000003 PHARM REV CODE 250: Performed by: INTERNAL MEDICINE

## 2021-01-01 PROCEDURE — 99999 PR PBB SHADOW E&M-EST. PATIENT-LVL V: CPT | Mod: PBBFAC,,, | Performed by: SURGERY

## 2021-01-01 PROCEDURE — 93010 ELECTROCARDIOGRAM REPORT: CPT | Mod: ,,, | Performed by: INTERNAL MEDICINE

## 2021-01-01 PROCEDURE — 99024 PR POST-OP FOLLOW-UP VISIT: ICD-10-PCS | Mod: S$PBB,,, | Performed by: SURGERY

## 2021-01-01 PROCEDURE — 87075 CULTR BACTERIA EXCEPT BLOOD: CPT | Performed by: ORTHOPAEDIC SURGERY

## 2021-01-01 PROCEDURE — U0002 COVID-19 LAB TEST NON-CDC: HCPCS | Performed by: ORTHOPAEDIC SURGERY

## 2021-01-01 PROCEDURE — 92134 CPTRZ OPH DX IMG PST SGM RTA: CPT | Mod: PBBFAC | Performed by: OPHTHALMOLOGY

## 2021-01-01 PROCEDURE — 49083 ABD PARACENTESIS W/IMAGING: CPT | Mod: ,,, | Performed by: RADIOLOGY

## 2021-01-01 PROCEDURE — 25000003 PHARM REV CODE 250: Performed by: HOSPITALIST

## 2021-01-01 PROCEDURE — 92014 COMPRE OPH EXAM EST PT 1/>: CPT | Mod: 25,S$PBB,, | Performed by: OPHTHALMOLOGY

## 2021-01-01 PROCEDURE — 99215 OFFICE O/P EST HI 40 MIN: CPT | Mod: 27 | Performed by: FAMILY MEDICINE

## 2021-01-01 PROCEDURE — 36620 PR INSERT CATH,ART,PERCUT,SHORTTERM: ICD-10-PCS | Mod: 59,,, | Performed by: ANESTHESIOLOGY

## 2021-01-01 PROCEDURE — 96366 THER/PROPH/DIAG IV INF ADDON: CPT | Mod: 59

## 2021-01-01 PROCEDURE — 11045 DBRDMT SUBQ TISS EACH ADDL: CPT

## 2021-01-01 PROCEDURE — 99999 PR PBB SHADOW E&M-EST. PATIENT-LVL V: ICD-10-PCS | Mod: PBBFAC,,, | Performed by: SURGERY

## 2021-01-01 PROCEDURE — G0180 PR HOME HEALTH MD CERTIFICATION: ICD-10-PCS | Mod: ,,, | Performed by: FAMILY MEDICINE

## 2021-01-01 PROCEDURE — 99213 OFFICE O/P EST LOW 20 MIN: CPT

## 2021-01-01 PROCEDURE — 87186 SC STD MICRODIL/AGAR DIL: CPT | Performed by: FAMILY MEDICINE

## 2021-01-01 PROCEDURE — P9047 ALBUMIN (HUMAN), 25%, 50ML: HCPCS | Mod: JW,JG | Performed by: RADIOLOGY

## 2021-01-01 PROCEDURE — 93005 ELECTROCARDIOGRAM TRACING: CPT

## 2021-01-01 PROCEDURE — 93922 UPR/L XTREMITY ART 2 LEVELS: CPT | Mod: 26,,, | Performed by: SURGERY

## 2021-01-01 PROCEDURE — 36000711: Performed by: ORTHOPAEDIC SURGERY

## 2021-01-01 PROCEDURE — 99214 PR OFFICE/OUTPT VISIT, EST, LEVL IV, 30-39 MIN: ICD-10-PCS | Mod: S$PBB,,, | Performed by: NURSE PRACTITIONER

## 2021-01-01 PROCEDURE — U0002 COVID-19 LAB TEST NON-CDC: HCPCS | Performed by: EMERGENCY MEDICINE

## 2021-01-01 PROCEDURE — 99999 PR PBB SHADOW E&M-EST. PATIENT-LVL V: ICD-10-PCS | Mod: PBBFAC,,, | Performed by: NURSE PRACTITIONER

## 2021-01-01 PROCEDURE — 97165 OT EVAL LOW COMPLEX 30 MIN: CPT

## 2021-01-01 PROCEDURE — 63600175 PHARM REV CODE 636 W HCPCS: Performed by: STUDENT IN AN ORGANIZED HEALTH CARE EDUCATION/TRAINING PROGRAM

## 2021-01-01 PROCEDURE — 71000033 HC RECOVERY, INTIAL HOUR: Performed by: ORTHOPAEDIC SURGERY

## 2021-01-01 PROCEDURE — 49083 IR PARACENTESIS NO IMAGING: ICD-10-PCS | Mod: RT,,, | Performed by: RADIOLOGY

## 2021-01-01 PROCEDURE — 99214 OFFICE O/P EST MOD 30 MIN: CPT | Mod: PBBFAC | Performed by: SURGERY

## 2021-01-01 PROCEDURE — 49082 ABD PARACENTESIS: CPT

## 2021-01-01 PROCEDURE — 11045 DBRDMT SUBQ TISS EACH ADDL: CPT | Performed by: FAMILY MEDICINE

## 2021-01-01 PROCEDURE — 92014 COMPRE OPH EXAM EST PT 1/>: CPT | Mod: S$PBB,,, | Performed by: OPHTHALMOLOGY

## 2021-01-01 PROCEDURE — 99212 OFFICE O/P EST SF 10 MIN: CPT | Performed by: FAMILY MEDICINE

## 2021-01-01 PROCEDURE — 93010 EKG 12-LEAD: ICD-10-PCS | Mod: ,,, | Performed by: INTERNAL MEDICINE

## 2021-01-01 PROCEDURE — 93922 UPR/L XTREMITY ART 2 LEVELS: CPT

## 2021-01-01 PROCEDURE — 88307 TISSUE EXAM BY PATHOLOGIST: CPT | Mod: 26,,, | Performed by: PATHOLOGY

## 2021-01-01 PROCEDURE — 99999 PR PBB SHADOW E&M-EST. PATIENT-LVL IV: ICD-10-PCS | Mod: PBBFAC,,, | Performed by: SURGERY

## 2021-01-01 PROCEDURE — 99214 OFFICE O/P EST MOD 30 MIN: CPT | Mod: PBBFAC,25 | Performed by: SURGERY

## 2021-01-01 PROCEDURE — 87186 SC STD MICRODIL/AGAR DIL: CPT | Mod: 59 | Performed by: FAMILY MEDICINE

## 2021-01-01 PROCEDURE — 87116 MYCOBACTERIA CULTURE: CPT | Performed by: ORTHOPAEDIC SURGERY

## 2021-01-01 PROCEDURE — 80053 COMPREHEN METABOLIC PANEL: CPT | Performed by: SURGERY

## 2021-01-01 PROCEDURE — 27201423 OPTIME MED/SURG SUP & DEVICES STERILE SUPPLY: Performed by: ORTHOPAEDIC SURGERY

## 2021-01-01 PROCEDURE — 92014 PR EYE EXAM, EST PATIENT,COMPREHESV: ICD-10-PCS | Mod: S$PBB,,, | Performed by: OPHTHALMOLOGY

## 2021-01-01 PROCEDURE — D9220A PRA ANESTHESIA: ICD-10-PCS | Mod: ANES,,, | Performed by: ANESTHESIOLOGY

## 2021-01-01 PROCEDURE — 63600175 PHARM REV CODE 636 W HCPCS: Performed by: INTERNAL MEDICINE

## 2021-01-01 PROCEDURE — 87206 SMEAR FLUORESCENT/ACID STAI: CPT | Performed by: ORTHOPAEDIC SURGERY

## 2021-01-01 RX ORDER — LANOLIN ALCOHOL/MO/W.PET/CERES
800 CREAM (GRAM) TOPICAL
Status: DISCONTINUED | OUTPATIENT
Start: 2021-01-01 | End: 2021-01-01 | Stop reason: HOSPADM

## 2021-01-01 RX ORDER — HYDROCODONE BITARTRATE AND ACETAMINOPHEN 5; 325 MG/1; MG/1
1 TABLET ORAL EVERY 12 HOURS PRN
Qty: 60 TABLET | Refills: 0 | Status: SHIPPED | OUTPATIENT
Start: 2021-01-01

## 2021-01-01 RX ORDER — CLINDAMYCIN HYDROCHLORIDE 300 MG/1
300 CAPSULE ORAL EVERY 8 HOURS
COMMUNITY
End: 2021-01-01 | Stop reason: ALTCHOICE

## 2021-01-01 RX ORDER — ALBUMIN HUMAN 250 G/1000ML
50 SOLUTION INTRAVENOUS ONCE
Status: COMPLETED | OUTPATIENT
Start: 2021-01-01 | End: 2021-01-01

## 2021-01-01 RX ORDER — ACETAMINOPHEN 500 MG
1000 TABLET ORAL ONCE
Status: COMPLETED | OUTPATIENT
Start: 2021-01-01 | End: 2021-01-01

## 2021-01-01 RX ORDER — POLYETHYLENE GLYCOL 3350 17 G/17G
17 POWDER, FOR SOLUTION ORAL 2 TIMES DAILY
Status: DISCONTINUED | OUTPATIENT
Start: 2021-01-01 | End: 2021-01-01

## 2021-01-01 RX ORDER — LIDOCAINE HYDROCHLORIDE 20 MG/ML
INJECTION, SOLUTION INFILTRATION; PERINEURAL CODE/TRAUMA/SEDATION MEDICATION
Status: COMPLETED | OUTPATIENT
Start: 2021-01-01 | End: 2021-01-01

## 2021-01-01 RX ORDER — HYDROCODONE BITARTRATE AND ACETAMINOPHEN 5; 325 MG/1; MG/1
1 TABLET ORAL EVERY 4 HOURS PRN
Status: DISCONTINUED | OUTPATIENT
Start: 2021-01-01 | End: 2021-01-01 | Stop reason: HOSPADM

## 2021-01-01 RX ORDER — ALBUMIN HUMAN 250 G/1000ML
62.5 SOLUTION INTRAVENOUS ONCE AS NEEDED
Status: COMPLETED | OUTPATIENT
Start: 2021-01-01 | End: 2021-01-01

## 2021-01-01 RX ORDER — ONDANSETRON 2 MG/ML
4 INJECTION INTRAMUSCULAR; INTRAVENOUS DAILY PRN
Status: DISCONTINUED | OUTPATIENT
Start: 2021-01-01 | End: 2021-01-01

## 2021-01-01 RX ORDER — ONDANSETRON 2 MG/ML
8 INJECTION INTRAMUSCULAR; INTRAVENOUS EVERY 6 HOURS PRN
Status: DISCONTINUED | OUTPATIENT
Start: 2021-01-01 | End: 2021-01-01 | Stop reason: HOSPADM

## 2021-01-01 RX ORDER — GABAPENTIN 100 MG/1
200 CAPSULE ORAL 3 TIMES DAILY
Status: DISCONTINUED | OUTPATIENT
Start: 2021-01-01 | End: 2021-01-01 | Stop reason: HOSPADM

## 2021-01-01 RX ORDER — ALBUMIN HUMAN 250 G/1000ML
12.5 SOLUTION INTRAVENOUS ONCE
Status: COMPLETED | OUTPATIENT
Start: 2021-01-01 | End: 2021-01-01

## 2021-01-01 RX ORDER — ALBUMIN HUMAN 250 G/1000ML
SOLUTION INTRAVENOUS
Status: DISCONTINUED | OUTPATIENT
Start: 2021-01-01 | End: 2021-01-01

## 2021-01-01 RX ORDER — TORSEMIDE 10 MG/1
100 TABLET ORAL 2 TIMES DAILY
Status: DISCONTINUED | OUTPATIENT
Start: 2021-01-01 | End: 2021-01-01

## 2021-01-01 RX ORDER — ALBUMIN HUMAN 250 G/1000ML
50 SOLUTION INTRAVENOUS ONCE
Status: DISCONTINUED | OUTPATIENT
Start: 2021-01-01 | End: 2021-01-01 | Stop reason: HOSPADM

## 2021-01-01 RX ORDER — ALBUMIN HUMAN 250 G/1000ML
75 SOLUTION INTRAVENOUS ONCE AS NEEDED
Status: DISCONTINUED | OUTPATIENT
Start: 2021-01-01 | End: 2021-01-01

## 2021-01-01 RX ORDER — SODIUM,POTASSIUM PHOSPHATES 280-250MG
2 POWDER IN PACKET (EA) ORAL
Status: DISCONTINUED | OUTPATIENT
Start: 2021-01-01 | End: 2021-01-01 | Stop reason: HOSPADM

## 2021-01-01 RX ORDER — MIDAZOLAM HYDROCHLORIDE 1 MG/ML
INJECTION INTRAMUSCULAR; INTRAVENOUS
Status: DISCONTINUED | OUTPATIENT
Start: 2021-01-01 | End: 2021-01-01

## 2021-01-01 RX ORDER — ALBUMIN HUMAN 250 G/1000ML
62.5 SOLUTION INTRAVENOUS ONCE AS NEEDED
Status: DISCONTINUED | OUTPATIENT
Start: 2021-01-01 | End: 2021-01-01

## 2021-01-01 RX ORDER — ALBUMIN HUMAN 250 G/1000ML
50 SOLUTION INTRAVENOUS ONCE AS NEEDED
Status: COMPLETED | OUTPATIENT
Start: 2021-01-01 | End: 2021-01-01

## 2021-01-01 RX ORDER — ALBUMIN HUMAN 250 G/1000ML
50 SOLUTION INTRAVENOUS ONCE AS NEEDED
Status: CANCELLED | OUTPATIENT
Start: 2021-01-01 | End: 2032-10-02

## 2021-01-01 RX ORDER — FENTANYL CITRATE 50 UG/ML
INJECTION, SOLUTION INTRAMUSCULAR; INTRAVENOUS
Status: DISCONTINUED | OUTPATIENT
Start: 2021-01-01 | End: 2021-01-01

## 2021-01-01 RX ORDER — GABAPENTIN 100 MG/1
CAPSULE ORAL
Qty: 60 CAPSULE | Refills: 11 | Status: ON HOLD | OUTPATIENT
Start: 2021-01-01 | End: 2022-01-01 | Stop reason: SDUPTHER

## 2021-01-01 RX ORDER — INSULIN ASPART 100 [IU]/ML
0-5 INJECTION, SOLUTION INTRAVENOUS; SUBCUTANEOUS
Status: DISCONTINUED | OUTPATIENT
Start: 2021-01-01 | End: 2021-01-01 | Stop reason: HOSPADM

## 2021-01-01 RX ORDER — ALBUMIN HUMAN 250 G/1000ML
12.5 SOLUTION INTRAVENOUS ONCE
Status: CANCELLED | OUTPATIENT
Start: 2021-01-01 | End: 2021-01-01

## 2021-01-01 RX ORDER — CLINDAMYCIN PHOSPHATE 900 MG/50ML
900 INJECTION, SOLUTION INTRAVENOUS
Status: DISPENSED | OUTPATIENT
Start: 2021-01-01 | End: 2021-01-01

## 2021-01-01 RX ORDER — KETOROLAC TROMETHAMINE 30 MG/ML
15 INJECTION, SOLUTION INTRAMUSCULAR; INTRAVENOUS EVERY 6 HOURS PRN
Status: DISCONTINUED | OUTPATIENT
Start: 2021-01-01 | End: 2021-01-01 | Stop reason: HOSPADM

## 2021-01-01 RX ORDER — POLYETHYLENE GLYCOL 3350 17 G/17G
17 POWDER, FOR SOLUTION ORAL DAILY
Status: DISCONTINUED | OUTPATIENT
Start: 2021-01-01 | End: 2021-01-01 | Stop reason: HOSPADM

## 2021-01-01 RX ORDER — ONDANSETRON 2 MG/ML
4 INJECTION INTRAMUSCULAR; INTRAVENOUS EVERY 12 HOURS PRN
Status: DISCONTINUED | OUTPATIENT
Start: 2021-01-01 | End: 2021-01-01 | Stop reason: HOSPADM

## 2021-01-01 RX ORDER — SODIUM CHLORIDE 0.9 % (FLUSH) 0.9 %
10 SYRINGE (ML) INJECTION
Status: DISCONTINUED | OUTPATIENT
Start: 2021-01-01 | End: 2021-01-01 | Stop reason: HOSPADM

## 2021-01-01 RX ORDER — OXYCODONE AND ACETAMINOPHEN 5; 325 MG/1; MG/1
1 TABLET ORAL EVERY 8 HOURS PRN
Qty: 30 TABLET | Refills: 0 | Status: CANCELLED | OUTPATIENT
Start: 2021-01-01

## 2021-01-01 RX ORDER — ALBUMIN HUMAN 250 G/1000ML
75 SOLUTION INTRAVENOUS ONCE AS NEEDED
Status: COMPLETED | OUTPATIENT
Start: 2021-01-01 | End: 2021-01-01

## 2021-01-01 RX ORDER — TORSEMIDE 10 MG/1
80 TABLET ORAL 2 TIMES DAILY
Status: DISCONTINUED | OUTPATIENT
Start: 2021-01-01 | End: 2021-01-01

## 2021-01-01 RX ORDER — HEPARIN SODIUM 5000 [USP'U]/ML
5000 INJECTION, SOLUTION INTRAVENOUS; SUBCUTANEOUS EVERY 12 HOURS
Status: DISCONTINUED | OUTPATIENT
Start: 2021-01-01 | End: 2021-01-01 | Stop reason: HOSPADM

## 2021-01-01 RX ORDER — ACETAMINOPHEN 325 MG/1
650 TABLET ORAL EVERY 4 HOURS PRN
Status: DISCONTINUED | OUTPATIENT
Start: 2021-01-01 | End: 2021-01-01 | Stop reason: HOSPADM

## 2021-01-01 RX ORDER — ALBUMIN HUMAN 250 G/1000ML
50 SOLUTION INTRAVENOUS ONCE AS NEEDED
Status: DISCONTINUED | OUTPATIENT
Start: 2021-01-01 | End: 2021-01-01 | Stop reason: HOSPADM

## 2021-01-01 RX ORDER — GLUCAGON 1 MG
1 KIT INJECTION
Status: DISCONTINUED | OUTPATIENT
Start: 2021-01-01 | End: 2021-01-01

## 2021-01-01 RX ORDER — EZETIMIBE 10 MG/1
10 TABLET ORAL DAILY
Status: DISCONTINUED | OUTPATIENT
Start: 2021-01-01 | End: 2021-01-01 | Stop reason: HOSPADM

## 2021-01-01 RX ORDER — ETOMIDATE 2 MG/ML
INJECTION INTRAVENOUS
Status: DISCONTINUED | OUTPATIENT
Start: 2021-01-01 | End: 2021-01-01

## 2021-01-01 RX ORDER — ALBUMIN HUMAN 250 G/1000ML
25 SOLUTION INTRAVENOUS ONCE
Status: CANCELLED | OUTPATIENT
Start: 2021-01-01 | End: 2021-01-01

## 2021-01-01 RX ORDER — SODIUM CHLORIDE 0.9 % (FLUSH) 0.9 %
3 SYRINGE (ML) INJECTION EVERY 8 HOURS
Status: DISCONTINUED | OUTPATIENT
Start: 2021-01-01 | End: 2021-01-01 | Stop reason: HOSPADM

## 2021-01-01 RX ORDER — CARVEDILOL 3.12 MG/1
3.12 TABLET ORAL 2 TIMES DAILY
Status: DISCONTINUED | OUTPATIENT
Start: 2021-01-01 | End: 2021-01-01

## 2021-01-01 RX ORDER — ACETAMINOPHEN 325 MG/1
650 TABLET ORAL EVERY 6 HOURS PRN
Status: DISCONTINUED | OUTPATIENT
Start: 2021-01-01 | End: 2021-01-01

## 2021-01-01 RX ORDER — BRIMONIDINE TARTRATE 2 MG/ML
1 SOLUTION/ DROPS OPHTHALMIC 3 TIMES DAILY
Qty: 30 ML | Refills: 3 | Status: SHIPPED | OUTPATIENT
Start: 2021-01-01 | End: 2022-01-01

## 2021-01-01 RX ORDER — LIDOCAINE HYDROCHLORIDE 20 MG/ML
INJECTION INTRAVENOUS
Status: DISCONTINUED | OUTPATIENT
Start: 2021-01-01 | End: 2021-01-01

## 2021-01-01 RX ORDER — GLUCAGON 1 MG
1 KIT INJECTION
Status: DISCONTINUED | OUTPATIENT
Start: 2021-01-01 | End: 2021-01-01 | Stop reason: HOSPADM

## 2021-01-01 RX ORDER — CLOPIDOGREL BISULFATE 75 MG/1
75 TABLET ORAL DAILY
Status: DISCONTINUED | OUTPATIENT
Start: 2021-01-01 | End: 2021-01-01 | Stop reason: HOSPADM

## 2021-01-01 RX ORDER — OXYCODONE AND ACETAMINOPHEN 5; 325 MG/1; MG/1
5 TABLET ORAL EVERY 8 HOURS PRN
COMMUNITY
Start: 2021-01-01 | End: 2021-01-01 | Stop reason: SDUPTHER

## 2021-01-01 RX ORDER — GABAPENTIN 100 MG/1
100 CAPSULE ORAL 3 TIMES DAILY
Status: DISCONTINUED | OUTPATIENT
Start: 2021-01-01 | End: 2021-01-01

## 2021-01-01 RX ORDER — ALBUMIN HUMAN 250 G/1000ML
50 SOLUTION INTRAVENOUS ONCE
Status: CANCELLED | OUTPATIENT
Start: 2021-01-01 | End: 2021-01-01

## 2021-01-01 RX ORDER — ONDANSETRON 2 MG/ML
INJECTION INTRAMUSCULAR; INTRAVENOUS
Status: DISCONTINUED | OUTPATIENT
Start: 2021-01-01 | End: 2021-01-01

## 2021-01-01 RX ORDER — INSULIN ASPART 100 [IU]/ML
1-10 INJECTION, SOLUTION INTRAVENOUS; SUBCUTANEOUS
Status: DISCONTINUED | OUTPATIENT
Start: 2021-01-01 | End: 2021-01-01

## 2021-01-01 RX ORDER — ALLOPURINOL 300 MG/1
300 TABLET ORAL DAILY
Qty: 30 TABLET | Refills: 3 | Status: SHIPPED | OUTPATIENT
Start: 2021-01-01 | End: 2022-01-01 | Stop reason: SDUPTHER

## 2021-01-01 RX ORDER — ASPIRIN 81 MG/1
81 TABLET ORAL DAILY
Status: DISCONTINUED | OUTPATIENT
Start: 2021-01-01 | End: 2021-01-01 | Stop reason: HOSPADM

## 2021-01-01 RX ORDER — FLUCONAZOLE 200 MG/1
200 TABLET ORAL
COMMUNITY
Start: 2021-01-01 | End: 2021-01-01

## 2021-01-01 RX ORDER — ALBUMIN HUMAN 250 G/1000ML
50 SOLUTION INTRAVENOUS ONCE AS NEEDED
Status: CANCELLED | OUTPATIENT
Start: 2021-01-01 | End: 2032-09-22

## 2021-01-01 RX ORDER — SUCCINYLCHOLINE CHLORIDE 20 MG/ML
INJECTION INTRAMUSCULAR; INTRAVENOUS
Status: DISCONTINUED | OUTPATIENT
Start: 2021-01-01 | End: 2021-01-01

## 2021-01-01 RX ORDER — LIDOCAINE HYDROCHLORIDE 10 MG/ML
INJECTION INFILTRATION; PERINEURAL CODE/TRAUMA/SEDATION MEDICATION
Status: COMPLETED | OUTPATIENT
Start: 2021-01-01 | End: 2021-01-01

## 2021-01-01 RX ORDER — PROCHLORPERAZINE EDISYLATE 5 MG/ML
5 INJECTION INTRAMUSCULAR; INTRAVENOUS EVERY 30 MIN PRN
Status: DISCONTINUED | OUTPATIENT
Start: 2021-01-01 | End: 2021-01-01

## 2021-01-01 RX ORDER — GABAPENTIN 100 MG/1
CAPSULE ORAL
Qty: 60 CAPSULE | Refills: 1 | Status: SHIPPED | OUTPATIENT
Start: 2021-01-01 | End: 2021-01-01 | Stop reason: SDUPTHER

## 2021-01-01 RX ORDER — OMEGA-3/DHA/EPA/FISH OIL 300-1000MG
1 CAPSULE,DELAYED RELEASE (ENTERIC COATED) ORAL 2 TIMES DAILY
Status: DISCONTINUED | OUTPATIENT
Start: 2021-01-01 | End: 2021-01-01 | Stop reason: HOSPADM

## 2021-01-01 RX ORDER — ALBUMIN HUMAN 250 G/1000ML
50 SOLUTION INTRAVENOUS ONCE
Status: DISCONTINUED | OUTPATIENT
Start: 2021-01-01 | End: 2021-01-01 | Stop reason: SDUPTHER

## 2021-01-01 RX ORDER — SODIUM CHLORIDE 9 MG/ML
INJECTION, SOLUTION INTRAVENOUS CONTINUOUS
Status: DISCONTINUED | OUTPATIENT
Start: 2021-01-01 | End: 2021-01-01 | Stop reason: HOSPADM

## 2021-01-01 RX ORDER — TAMSULOSIN HYDROCHLORIDE 0.4 MG/1
0.4 CAPSULE ORAL DAILY
Status: DISCONTINUED | OUTPATIENT
Start: 2021-01-01 | End: 2021-01-01 | Stop reason: HOSPADM

## 2021-01-01 RX ORDER — METOLAZONE 2.5 MG/1
2.5 TABLET ORAL DAILY
Qty: 30 TABLET | Refills: 0 | Status: SHIPPED | OUTPATIENT
Start: 2021-01-01 | End: 2021-01-01

## 2021-01-01 RX ORDER — DORZOLAMIDE HYDROCHLORIDE AND TIMOLOL MALEATE 20; 5 MG/ML; MG/ML
1 SOLUTION/ DROPS OPHTHALMIC 3 TIMES DAILY
Status: DISCONTINUED | OUTPATIENT
Start: 2021-01-01 | End: 2021-01-01 | Stop reason: HOSPADM

## 2021-01-01 RX ORDER — OXYCODONE AND ACETAMINOPHEN 5; 325 MG/1; MG/1
1 TABLET ORAL EVERY 8 HOURS PRN
Qty: 45 TABLET | Refills: 0 | Status: SHIPPED | OUTPATIENT
Start: 2021-01-01 | End: 2021-01-01 | Stop reason: SDUPTHER

## 2021-01-01 RX ORDER — CETIRIZINE HYDROCHLORIDE 10 MG/1
10 TABLET ORAL DAILY
Status: DISCONTINUED | OUTPATIENT
Start: 2021-01-01 | End: 2021-01-01

## 2021-01-01 RX ORDER — ALBUMIN HUMAN 250 G/1000ML
25 SOLUTION INTRAVENOUS ONCE
Status: COMPLETED | OUTPATIENT
Start: 2021-01-01 | End: 2021-01-01

## 2021-01-01 RX ORDER — LIDOCAINE HYDROCHLORIDE AND EPINEPHRINE 10; 10 MG/ML; UG/ML
20 INJECTION, SOLUTION INFILTRATION; PERINEURAL ONCE
Status: COMPLETED | OUTPATIENT
Start: 2021-01-01 | End: 2021-01-01

## 2021-01-01 RX ORDER — HYDROCODONE BITARTRATE AND ACETAMINOPHEN 5; 325 MG/1; MG/1
1 TABLET ORAL EVERY 12 HOURS PRN
Qty: 40 TABLET | Refills: 0 | Status: SHIPPED | OUTPATIENT
Start: 2021-01-01 | End: 2021-01-01

## 2021-01-01 RX ORDER — CETIRIZINE HYDROCHLORIDE 10 MG/1
10 TABLET ORAL DAILY PRN
Status: DISCONTINUED | OUTPATIENT
Start: 2021-01-01 | End: 2021-01-01 | Stop reason: HOSPADM

## 2021-01-01 RX ORDER — BRIMONIDINE TARTRATE 2 MG/ML
1 SOLUTION/ DROPS OPHTHALMIC 3 TIMES DAILY
Status: DISCONTINUED | OUTPATIENT
Start: 2021-01-01 | End: 2021-01-01 | Stop reason: HOSPADM

## 2021-01-01 RX ORDER — IBUPROFEN 200 MG
24 TABLET ORAL
Status: DISCONTINUED | OUTPATIENT
Start: 2021-01-01 | End: 2021-01-01

## 2021-01-01 RX ORDER — ALLOPURINOL 100 MG/1
300 TABLET ORAL DAILY
Status: DISCONTINUED | OUTPATIENT
Start: 2021-01-01 | End: 2021-01-01 | Stop reason: HOSPADM

## 2021-01-01 RX ORDER — PHENYLEPHRINE HYDROCHLORIDE 10 MG/ML
INJECTION INTRAVENOUS
Status: DISCONTINUED | OUTPATIENT
Start: 2021-01-01 | End: 2021-01-01

## 2021-01-01 RX ORDER — IBUPROFEN 200 MG
16 TABLET ORAL
Status: DISCONTINUED | OUTPATIENT
Start: 2021-01-01 | End: 2021-01-01

## 2021-01-01 RX ORDER — OXYCODONE AND ACETAMINOPHEN 5; 325 MG/1; MG/1
1 TABLET ORAL EVERY 8 HOURS PRN
Qty: 45 TABLET | Refills: 0 | Status: SHIPPED | OUTPATIENT
Start: 2021-01-01 | End: 2021-01-01

## 2021-01-01 RX ORDER — CIPROFLOXACIN 500 MG/1
500 TABLET ORAL DAILY
COMMUNITY
Start: 2021-01-01 | End: 2021-01-01 | Stop reason: ALTCHOICE

## 2021-01-01 RX ORDER — ALBUMIN HUMAN 250 G/1000ML
50 SOLUTION INTRAVENOUS ONCE AS NEEDED
Status: CANCELLED | OUTPATIENT
Start: 2021-01-01 | End: 2032-10-10

## 2021-01-01 RX ORDER — ALBUMIN HUMAN 250 G/1000ML
62.5 SOLUTION INTRAVENOUS ONCE AS NEEDED
Status: DISCONTINUED | OUTPATIENT
Start: 2021-01-01 | End: 2021-01-01 | Stop reason: HOSPADM

## 2021-01-01 RX ORDER — ALBUMIN HUMAN 250 G/1000ML
50 SOLUTION INTRAVENOUS ONCE AS NEEDED
Status: DISCONTINUED | OUTPATIENT
Start: 2021-01-01 | End: 2021-01-01

## 2021-01-01 RX ORDER — ALBUMIN HUMAN 250 G/1000ML
54 SOLUTION INTRAVENOUS ONCE
Status: COMPLETED | OUTPATIENT
Start: 2021-01-01 | End: 2021-01-01

## 2021-01-01 RX ORDER — HYDROCODONE BITARTRATE AND ACETAMINOPHEN 500; 5 MG/1; MG/1
TABLET ORAL
Status: DISCONTINUED | OUTPATIENT
Start: 2021-01-01 | End: 2021-01-01 | Stop reason: HOSPADM

## 2021-01-01 RX ORDER — ALBUMIN HUMAN 250 G/1000ML
25 SOLUTION INTRAVENOUS ONCE
Status: DISCONTINUED | OUTPATIENT
Start: 2021-01-01 | End: 2021-01-01 | Stop reason: SDUPTHER

## 2021-01-01 RX ORDER — ALBUMIN HUMAN 250 G/1000ML
12.5 SOLUTION INTRAVENOUS ONCE
Status: DISCONTINUED | OUTPATIENT
Start: 2021-01-01 | End: 2021-01-01

## 2021-01-01 RX ORDER — TALC
6 POWDER (GRAM) TOPICAL NIGHTLY PRN
Status: DISCONTINUED | OUTPATIENT
Start: 2021-01-01 | End: 2021-01-01 | Stop reason: HOSPADM

## 2021-01-01 RX ORDER — LIDOCAINE HYDROCHLORIDE 10 MG/ML
1 INJECTION, SOLUTION EPIDURAL; INFILTRATION; INTRACAUDAL; PERINEURAL ONCE
Status: DISCONTINUED | OUTPATIENT
Start: 2021-01-01 | End: 2021-01-01

## 2021-01-01 RX ORDER — COLLAGENASE SANTYL 250 [ARB'U]/G
OINTMENT TOPICAL DAILY
Qty: 90 G | Refills: 3 | Status: SHIPPED | OUTPATIENT
Start: 2021-01-01

## 2021-01-01 RX ORDER — SODIUM CHLORIDE, SODIUM LACTATE, POTASSIUM CHLORIDE, CALCIUM CHLORIDE 600; 310; 30; 20 MG/100ML; MG/100ML; MG/100ML; MG/100ML
INJECTION, SOLUTION INTRAVENOUS CONTINUOUS
Status: DISCONTINUED | OUTPATIENT
Start: 2021-01-01 | End: 2021-01-01

## 2021-01-01 RX ORDER — GLUCOSAM/CHONDRO/HERB 149/HYAL 750-100 MG
1 TABLET ORAL DAILY
Status: DISCONTINUED | OUTPATIENT
Start: 2021-01-01 | End: 2021-01-01

## 2021-01-01 RX ORDER — DORZOLAMIDE HYDROCHLORIDE AND TIMOLOL MALEATE 20; 5 MG/ML; MG/ML
1 SOLUTION/ DROPS OPHTHALMIC 3 TIMES DAILY
Qty: 30 ML | Refills: 3 | Status: SHIPPED | OUTPATIENT
Start: 2021-01-01

## 2021-01-01 RX ORDER — ONDANSETRON 2 MG/ML
4 INJECTION INTRAMUSCULAR; INTRAVENOUS EVERY 6 HOURS PRN
Status: DISCONTINUED | OUTPATIENT
Start: 2021-01-01 | End: 2021-01-01 | Stop reason: HOSPADM

## 2021-01-01 RX ORDER — IBUPROFEN 200 MG
24 TABLET ORAL
Status: DISCONTINUED | OUTPATIENT
Start: 2021-01-01 | End: 2021-01-01 | Stop reason: HOSPADM

## 2021-01-01 RX ORDER — SODIUM CHLORIDE, SODIUM LACTATE, POTASSIUM CHLORIDE, CALCIUM CHLORIDE 600; 310; 30; 20 MG/100ML; MG/100ML; MG/100ML; MG/100ML
INJECTION, SOLUTION INTRAVENOUS CONTINUOUS PRN
Status: DISCONTINUED | OUTPATIENT
Start: 2021-01-01 | End: 2021-01-01

## 2021-01-01 RX ORDER — IBUPROFEN 200 MG
16 TABLET ORAL
Status: DISCONTINUED | OUTPATIENT
Start: 2021-01-01 | End: 2021-01-01 | Stop reason: HOSPADM

## 2021-01-01 RX ORDER — HYDROMORPHONE HYDROCHLORIDE 2 MG/ML
0.2 INJECTION, SOLUTION INTRAMUSCULAR; INTRAVENOUS; SUBCUTANEOUS EVERY 5 MIN PRN
Status: DISCONTINUED | OUTPATIENT
Start: 2021-01-01 | End: 2021-01-01

## 2021-01-01 RX ORDER — ACETAMINOPHEN 325 MG/1
650 TABLET ORAL EVERY 8 HOURS PRN
Status: DISCONTINUED | OUTPATIENT
Start: 2021-01-01 | End: 2021-01-01 | Stop reason: HOSPADM

## 2021-01-01 RX ORDER — HYDROCODONE BITARTRATE AND ACETAMINOPHEN 5; 325 MG/1; MG/1
1 TABLET ORAL EVERY 12 HOURS PRN
COMMUNITY
Start: 2021-01-01 | End: 2021-01-01 | Stop reason: SDUPTHER

## 2021-01-01 RX ORDER — ALBUMIN HUMAN 250 G/1000ML
50 SOLUTION INTRAVENOUS ONCE AS NEEDED
Status: CANCELLED | OUTPATIENT
Start: 2021-01-01 | End: 2032-09-12

## 2021-01-01 RX ORDER — ALBUMIN HUMAN 250 G/1000ML
37.5 SOLUTION INTRAVENOUS ONCE AS NEEDED
Status: COMPLETED | OUTPATIENT
Start: 2021-01-01 | End: 2021-01-01

## 2021-01-01 RX ORDER — SODIUM CHLORIDE 0.9 % (FLUSH) 0.9 %
10 SYRINGE (ML) INJECTION
Status: DISCONTINUED | OUTPATIENT
Start: 2021-01-01 | End: 2021-01-01

## 2021-01-01 RX ADMIN — DORZOLAMIDE HYDROCHLORIDE AND TIMOLOL MALEATE 1 DROP: 20; 5 SOLUTION/ DROPS OPHTHALMIC at 08:05

## 2021-01-01 RX ADMIN — ALBUMIN (HUMAN) 45.6 G: 25 SOLUTION INTRAVENOUS at 10:08

## 2021-01-01 RX ADMIN — HYDROMORPHONE HYDROCHLORIDE 0.2 MG: 2 INJECTION INTRAMUSCULAR; INTRAVENOUS; SUBCUTANEOUS at 09:05

## 2021-01-01 RX ADMIN — LIDOCAINE HYDROCHLORIDE 6 ML: 20 INJECTION, SOLUTION INFILTRATION; PERINEURAL at 10:09

## 2021-01-01 RX ADMIN — ALBUMIN (HUMAN) 50 G: 0.25 INJECTION, SOLUTION INTRAVENOUS at 09:06

## 2021-01-01 RX ADMIN — LIDOCAINE HYDROCHLORIDE 3 ML: 20 INJECTION, SOLUTION INFILTRATION; PERINEURAL at 09:09

## 2021-01-01 RX ADMIN — ALLOPURINOL 300 MG: 100 TABLET ORAL at 12:05

## 2021-01-01 RX ADMIN — INSULIN ASPART 1 UNITS: 100 INJECTION, SOLUTION INTRAVENOUS; SUBCUTANEOUS at 08:05

## 2021-01-01 RX ADMIN — ALBUMIN (HUMAN) 50 G: 0.25 INJECTION, SOLUTION INTRAVENOUS at 10:10

## 2021-01-01 RX ADMIN — ALBUMIN (HUMAN) 50 G: 0.25 INJECTION, SOLUTION INTRAVENOUS at 10:07

## 2021-01-01 RX ADMIN — PHENYLEPHRINE HYDROCHLORIDE 100 MCG: 10 INJECTION INTRAVENOUS at 09:05

## 2021-01-01 RX ADMIN — DORZOLAMIDE HYDROCHLORIDE AND TIMOLOL MALEATE 1 DROP: 20; 5 SOLUTION/ DROPS OPHTHALMIC at 02:05

## 2021-01-01 RX ADMIN — LIDOCAINE HYDROCHLORIDE 3 ML: 20 INJECTION, SOLUTION INFILTRATION; PERINEURAL at 09:11

## 2021-01-01 RX ADMIN — PHENYLEPHRINE HYDROCHLORIDE 100 MCG: 10 INJECTION INTRAVENOUS at 07:05

## 2021-01-01 RX ADMIN — BRIMONIDINE TARTRATE 1 DROP: 2 SOLUTION OPHTHALMIC at 02:05

## 2021-01-01 RX ADMIN — LIDOCAINE HYDROCHLORIDE 8 ML: 20 INJECTION, SOLUTION INFILTRATION; PERINEURAL at 11:09

## 2021-01-01 RX ADMIN — ALBUMIN (HUMAN) 50 G: 0.25 INJECTION, SOLUTION INTRAVENOUS at 12:09

## 2021-01-01 RX ADMIN — ALBUMIN (HUMAN) 50 G: 5 SOLUTION INTRAVENOUS at 11:12

## 2021-01-01 RX ADMIN — ALBUMIN (HUMAN) 50 G: 5 SOLUTION INTRAVENOUS at 10:12

## 2021-01-01 RX ADMIN — KETOROLAC TROMETHAMINE 15 MG: 30 INJECTION, SOLUTION INTRAMUSCULAR; INTRAVENOUS at 06:05

## 2021-01-01 RX ADMIN — ALBUMIN (HUMAN) 50 G: 12.5 SOLUTION INTRAVENOUS at 02:08

## 2021-01-01 RX ADMIN — OMEGA-3 FATTY ACIDS CAP DELAYED RELEASE 1000 MG 1 CAPSULE: 1000 CAPSULE DELAYED RELEASE at 08:05

## 2021-01-01 RX ADMIN — LIDOCAINE HYDROCHLORIDE 8 ML: 20 INJECTION, SOLUTION INFILTRATION; PERINEURAL at 10:11

## 2021-01-01 RX ADMIN — ALBUMIN (HUMAN) 25 G: 5 SOLUTION INTRAVENOUS at 11:11

## 2021-01-01 RX ADMIN — ALBUMIN (HUMAN) 25 G: 5 SOLUTION INTRAVENOUS at 10:11

## 2021-01-01 RX ADMIN — EZETIMIBE 10 MG: 10 TABLET ORAL at 09:05

## 2021-01-01 RX ADMIN — HYDROCODONE BITARTRATE AND ACETAMINOPHEN 1 TABLET: 5; 325 TABLET ORAL at 02:05

## 2021-01-01 RX ADMIN — ASPIRIN 81 MG: 81 TABLET, COATED ORAL at 09:05

## 2021-01-01 RX ADMIN — LIDOCAINE HYDROCHLORIDE 3 ML: 20 INJECTION, SOLUTION INFILTRATION; PERINEURAL at 10:11

## 2021-01-01 RX ADMIN — CETIRIZINE HYDROCHLORIDE 10 MG: 10 TABLET, FILM COATED ORAL at 12:05

## 2021-01-01 RX ADMIN — SODIUM CHLORIDE, SODIUM LACTATE, POTASSIUM CHLORIDE, AND CALCIUM CHLORIDE: .6; .31; .03; .02 INJECTION, SOLUTION INTRAVENOUS at 07:05

## 2021-01-01 RX ADMIN — ALBUMIN (HUMAN) 50 G: 0.25 INJECTION, SOLUTION INTRAVENOUS at 05:05

## 2021-01-01 RX ADMIN — TORSEMIDE 80 MG: 10 TABLET ORAL at 08:05

## 2021-01-01 RX ADMIN — ALBUMIN (HUMAN) 75 G: 0.25 INJECTION, SOLUTION INTRAVENOUS at 01:08

## 2021-01-01 RX ADMIN — CLINDAMYCIN IN 5 PERCENT DEXTROSE 900 MG: 18 INJECTION, SOLUTION INTRAVENOUS at 12:05

## 2021-01-01 RX ADMIN — LIDOCAINE HYDROCHLORIDE AND EPINEPHRINE 20 ML: 10; 10 INJECTION, SOLUTION INFILTRATION; PERINEURAL at 03:08

## 2021-01-01 RX ADMIN — ONDANSETRON 4 MG: 2 INJECTION, SOLUTION INTRAMUSCULAR; INTRAVENOUS at 09:05

## 2021-01-01 RX ADMIN — SUCCINYLCHOLINE CHLORIDE 120 MG: 20 INJECTION, SOLUTION INTRAMUSCULAR; INTRAVENOUS at 07:05

## 2021-01-01 RX ADMIN — ALBUMIN (HUMAN) 50 G: 0.25 INJECTION, SOLUTION INTRAVENOUS at 11:04

## 2021-01-01 RX ADMIN — ALBUMIN (HUMAN) 62.5 G: 0.25 INJECTION, SOLUTION INTRAVENOUS at 11:09

## 2021-01-01 RX ADMIN — Medication 3 ML: at 01:05

## 2021-01-01 RX ADMIN — OMEGA-3 FATTY ACIDS CAP DELAYED RELEASE 1000 MG 1 CAPSULE: 1000 CAPSULE DELAYED RELEASE at 09:05

## 2021-01-01 RX ADMIN — ALBUMIN (HUMAN) 12.5 G: 0.25 INJECTION, SOLUTION INTRAVENOUS at 10:06

## 2021-01-01 RX ADMIN — ALBUMIN (HUMAN) 50 G: 0.25 INJECTION, SOLUTION INTRAVENOUS at 12:10

## 2021-01-01 RX ADMIN — ASPIRIN 81 MG: 81 TABLET, COATED ORAL at 12:05

## 2021-01-01 RX ADMIN — ALBUMIN (HUMAN) 50 G: 0.25 INJECTION, SOLUTION INTRAVENOUS at 01:06

## 2021-01-01 RX ADMIN — ALBUMIN (HUMAN) 54 G: 0.25 INJECTION, SOLUTION INTRAVENOUS at 10:09

## 2021-01-01 RX ADMIN — CARVEDILOL 3.12 MG: 3.12 TABLET, FILM COATED ORAL at 12:05

## 2021-01-01 RX ADMIN — LIDOCAINE HYDROCHLORIDE 2 ML: 20 INJECTION, SOLUTION INFILTRATION; PERINEURAL at 11:10

## 2021-01-01 RX ADMIN — MIDAZOLAM HYDROCHLORIDE 1 MG: 1 INJECTION, SOLUTION INTRAMUSCULAR; INTRAVENOUS at 07:05

## 2021-01-01 RX ADMIN — ALBUMIN (HUMAN) 50 G: 0.25 INJECTION, SOLUTION INTRAVENOUS at 03:08

## 2021-01-01 RX ADMIN — FENTANYL CITRATE 25 MCG: 50 INJECTION, SOLUTION INTRAMUSCULAR; INTRAVENOUS at 07:05

## 2021-01-01 RX ADMIN — PHENYLEPHRINE HYDROCHLORIDE 50 MCG: 10 INJECTION INTRAVENOUS at 08:05

## 2021-01-01 RX ADMIN — KETOROLAC TROMETHAMINE 15 MG: 30 INJECTION, SOLUTION INTRAMUSCULAR; INTRAVENOUS at 12:05

## 2021-01-01 RX ADMIN — ALBUMIN (HUMAN) 75 G: 5 SOLUTION INTRAVENOUS at 03:10

## 2021-01-01 RX ADMIN — ALBUMIN (HUMAN) 62.5 G: 0.25 INJECTION, SOLUTION INTRAVENOUS at 02:06

## 2021-01-01 RX ADMIN — BRIMONIDINE TARTRATE 1 DROP: 2 SOLUTION OPHTHALMIC at 08:05

## 2021-01-01 RX ADMIN — HYDROMORPHONE HYDROCHLORIDE 0.2 MG: 2 INJECTION INTRAMUSCULAR; INTRAVENOUS; SUBCUTANEOUS at 10:05

## 2021-01-01 RX ADMIN — ALBUMIN (HUMAN) 37.5 G: 0.25 INJECTION, SOLUTION INTRAVENOUS at 04:05

## 2021-01-01 RX ADMIN — ALBUMIN (HUMAN) 25 G: 0.25 INJECTION, SOLUTION INTRAVENOUS at 01:06

## 2021-01-01 RX ADMIN — VANCOMYCIN HYDROCHLORIDE 2000 MG: 10 INJECTION, POWDER, LYOPHILIZED, FOR SOLUTION INTRAVENOUS at 07:05

## 2021-01-01 RX ADMIN — PHENYLEPHRINE HYDROCHLORIDE 100 MCG: 10 INJECTION INTRAVENOUS at 08:05

## 2021-01-01 RX ADMIN — LIDOCAINE HYDROCHLORIDE 5 ML: 20 INJECTION, SOLUTION INFILTRATION; PERINEURAL at 08:06

## 2021-01-01 RX ADMIN — LIDOCAINE HYDROCHLORIDE 5 ML: 10 INJECTION, SOLUTION INFILTRATION; PERINEURAL at 01:08

## 2021-01-01 RX ADMIN — PHENYLEPHRINE HYDROCHLORIDE 50 MCG: 10 INJECTION INTRAVENOUS at 07:05

## 2021-01-01 RX ADMIN — EZETIMIBE 10 MG: 10 TABLET ORAL at 12:05

## 2021-01-01 RX ADMIN — HEPARIN SODIUM 5000 UNITS: 5000 INJECTION INTRAVENOUS; SUBCUTANEOUS at 09:05

## 2021-01-01 RX ADMIN — LIDOCAINE HYDROCHLORIDE 4 ML: 20 INJECTION, SOLUTION INFILTRATION; PERINEURAL at 12:07

## 2021-01-01 RX ADMIN — INSULIN DETEMIR 15 UNITS: 100 INJECTION, SOLUTION SUBCUTANEOUS at 04:05

## 2021-01-01 RX ADMIN — CARVEDILOL 3.12 MG: 3.12 TABLET, FILM COATED ORAL at 08:05

## 2021-01-01 RX ADMIN — THERA TABS 1 TABLET: TAB at 12:05

## 2021-01-01 RX ADMIN — TAMSULOSIN HYDROCHLORIDE 0.4 MG: 0.4 CAPSULE ORAL at 11:05

## 2021-01-01 RX ADMIN — ALBUMIN (HUMAN) 50 G: 0.25 INJECTION, SOLUTION INTRAVENOUS at 12:04

## 2021-01-01 RX ADMIN — FENTANYL CITRATE 25 MCG: 50 INJECTION, SOLUTION INTRAMUSCULAR; INTRAVENOUS at 08:05

## 2021-01-01 RX ADMIN — GABAPENTIN 100 MG: 100 CAPSULE ORAL at 02:05

## 2021-01-01 RX ADMIN — ALLOPURINOL 300 MG: 100 TABLET ORAL at 09:05

## 2021-01-01 RX ADMIN — ALBUMIN (HUMAN) 50 G: 5 SOLUTION INTRAVENOUS at 11:11

## 2021-01-01 RX ADMIN — HYDROCODONE BITARTRATE AND ACETAMINOPHEN 1 TABLET: 5; 325 TABLET ORAL at 09:05

## 2021-01-01 RX ADMIN — ALBUMIN (HUMAN) 25 G: 0.25 INJECTION, SOLUTION INTRAVENOUS at 09:06

## 2021-01-01 RX ADMIN — FENTANYL CITRATE 50 MCG: 50 INJECTION, SOLUTION INTRAMUSCULAR; INTRAVENOUS at 07:05

## 2021-01-01 RX ADMIN — LIDOCAINE HYDROCHLORIDE 8 ML: 20 INJECTION, SOLUTION INFILTRATION; PERINEURAL at 12:06

## 2021-01-01 RX ADMIN — ALBUMIN (HUMAN) 50 G: 0.25 INJECTION, SOLUTION INTRAVENOUS at 01:07

## 2021-01-01 RX ADMIN — Medication 100 MG: at 07:05

## 2021-01-01 RX ADMIN — CLOPIDOGREL 75 MG: 75 TABLET, FILM COATED ORAL at 12:05

## 2021-01-01 RX ADMIN — BEVACIZUMAB 1.25 MG: 100 INJECTION, SOLUTION INTRAVENOUS at 09:06

## 2021-01-01 RX ADMIN — GABAPENTIN 200 MG: 100 CAPSULE ORAL at 02:05

## 2021-01-01 RX ADMIN — ALBUMIN (HUMAN) 50 G: 0.25 INJECTION, SOLUTION INTRAVENOUS at 10:06

## 2021-01-01 RX ADMIN — CLOPIDOGREL 75 MG: 75 TABLET, FILM COATED ORAL at 09:05

## 2021-01-01 RX ADMIN — POLYETHYLENE GLYCOL 3350 17 G: 17 POWDER, FOR SOLUTION ORAL at 09:05

## 2021-01-01 RX ADMIN — ALBUMIN (HUMAN) 62.5 G: 0.25 INJECTION, SOLUTION INTRAVENOUS at 12:07

## 2021-01-01 RX ADMIN — BEVACIZUMAB 1.25 MG: 100 INJECTION, SOLUTION INTRAVENOUS at 02:12

## 2021-01-01 RX ADMIN — LIDOCAINE HYDROCHLORIDE 6 ML: 20 INJECTION, SOLUTION INFILTRATION; PERINEURAL at 11:09

## 2021-01-01 RX ADMIN — CLINDAMYCIN IN 5 PERCENT DEXTROSE 900 MG: 18 INJECTION, SOLUTION INTRAVENOUS at 03:05

## 2021-01-01 RX ADMIN — THERA TABS 1 TABLET: TAB at 06:05

## 2021-01-01 RX ADMIN — INSULIN DETEMIR 15 UNITS: 100 INJECTION, SOLUTION SUBCUTANEOUS at 09:05

## 2021-01-01 RX ADMIN — BRIMONIDINE TARTRATE 1 DROP: 2 SOLUTION OPHTHALMIC at 09:05

## 2021-01-01 RX ADMIN — ALBUMIN (HUMAN) 50 G: 5 SOLUTION INTRAVENOUS at 09:10

## 2021-01-01 RX ADMIN — ACETAMINOPHEN 1000 MG: 500 TABLET, FILM COATED ORAL at 06:05

## 2021-01-01 RX ADMIN — DORZOLAMIDE HYDROCHLORIDE AND TIMOLOL MALEATE 1 DROP: 20; 5 SOLUTION/ DROPS OPHTHALMIC at 09:05

## 2021-01-01 RX ADMIN — ETOMIDATE 10 MG: 2 INJECTION, SOLUTION INTRAVENOUS at 07:05

## 2021-01-01 RX ADMIN — GABAPENTIN 200 MG: 100 CAPSULE ORAL at 09:05

## 2021-01-01 RX ADMIN — GABAPENTIN 200 MG: 100 CAPSULE ORAL at 08:05

## 2021-01-01 RX ADMIN — SODIUM CHLORIDE: 0.9 INJECTION, SOLUTION INTRAVENOUS at 09:05

## 2021-01-04 ENCOUNTER — OFFICE VISIT (OUTPATIENT)
Dept: VASCULAR SURGERY | Facility: CLINIC | Age: 61
End: 2021-01-04
Payer: MEDICAID

## 2021-01-04 ENCOUNTER — PATIENT MESSAGE (OUTPATIENT)
Dept: ADMINISTRATIVE | Facility: HOSPITAL | Age: 61
End: 2021-01-04

## 2021-01-04 VITALS — HEIGHT: 70 IN | SYSTOLIC BLOOD PRESSURE: 110 MMHG | BODY MASS INDEX: 29.48 KG/M2 | DIASTOLIC BLOOD PRESSURE: 68 MMHG

## 2021-01-04 DIAGNOSIS — I70.245 ATHEROSCLEROSIS OF NATIVE ARTERY OF LEFT LOWER EXTREMITY WITH ULCERATION OF OTHER PART OF FOOT: ICD-10-CM

## 2021-01-04 DIAGNOSIS — I70.233 ATHEROSCLEROSIS OF NATIVE ARTERY OF RIGHT LOWER EXTREMITY WITH ULCERATION OF ANKLE: Primary | ICD-10-CM

## 2021-01-04 PROCEDURE — 99215 PR OFFICE/OUTPT VISIT, EST, LEVL V, 40-54 MIN: ICD-10-PCS | Mod: S$PBB,,, | Performed by: SURGERY

## 2021-01-04 PROCEDURE — 99999 PR PBB SHADOW E&M-EST. PATIENT-LVL IV: ICD-10-PCS | Mod: PBBFAC,,, | Performed by: SURGERY

## 2021-01-04 PROCEDURE — 99999 PR PBB SHADOW E&M-EST. PATIENT-LVL IV: CPT | Mod: PBBFAC,,, | Performed by: SURGERY

## 2021-01-04 PROCEDURE — 99214 OFFICE O/P EST MOD 30 MIN: CPT | Mod: PBBFAC | Performed by: SURGERY

## 2021-01-04 PROCEDURE — 99215 OFFICE O/P EST HI 40 MIN: CPT | Mod: S$PBB,,, | Performed by: SURGERY

## 2021-01-05 ENCOUNTER — PATIENT MESSAGE (OUTPATIENT)
Dept: WOUND CARE | Facility: HOSPITAL | Age: 61
End: 2021-01-05

## 2021-01-06 ENCOUNTER — HOSPITAL ENCOUNTER (OUTPATIENT)
Dept: WOUND CARE | Facility: HOSPITAL | Age: 61
Discharge: HOME | End: 2021-01-06
Attending: PODIATRIST
Payer: MEDICAID

## 2021-01-06 ENCOUNTER — TELEPHONE (OUTPATIENT)
Dept: VASCULAR SURGERY | Facility: CLINIC | Age: 61
End: 2021-01-06

## 2021-01-06 VITALS
TEMPERATURE: 98 F | RESPIRATION RATE: 17 BRPM | HEART RATE: 70 BPM | DIASTOLIC BLOOD PRESSURE: 65 MMHG | SYSTOLIC BLOOD PRESSURE: 129 MMHG

## 2021-01-06 DIAGNOSIS — I73.9 PVD (PERIPHERAL VASCULAR DISEASE): ICD-10-CM

## 2021-01-06 DIAGNOSIS — L97.516 DIABETIC ULCER OF TOE OF RIGHT FOOT ASSOCIATED WITH TYPE 2 DIABETES MELLITUS, WITH BONE INVOLVEMENT WITHOUT EVIDENCE OF NECROSIS: ICD-10-CM

## 2021-01-06 DIAGNOSIS — L97.529 TYPE 2 DIABETES MELLITUS WITH LEFT DIABETIC FOOT ULCER: ICD-10-CM

## 2021-01-06 DIAGNOSIS — E11.621 TYPE 2 DIABETES MELLITUS WITH LEFT DIABETIC FOOT ULCER: ICD-10-CM

## 2021-01-06 DIAGNOSIS — I70.234 ATHEROSCLEROSIS OF NATIVE ARTERY OF RIGHT LOWER EXTREMITY WITH ULCERATION OF HEEL: Primary | ICD-10-CM

## 2021-01-06 DIAGNOSIS — E11.621 DIABETIC ULCER OF TOE OF RIGHT FOOT ASSOCIATED WITH TYPE 2 DIABETES MELLITUS, WITH BONE INVOLVEMENT WITHOUT EVIDENCE OF NECROSIS: ICD-10-CM

## 2021-01-06 DIAGNOSIS — I70.233 ATHEROSCLEROSIS OF NATIVE ARTERY OF RIGHT LOWER EXTREMITY WITH ULCERATION OF ANKLE: Primary | ICD-10-CM

## 2021-01-06 PROCEDURE — 87077 CULTURE AEROBIC IDENTIFY: CPT

## 2021-01-06 PROCEDURE — 99215 PR OFFICE/OUTPT VISIT, EST, LEVL V, 40-54 MIN: ICD-10-PCS | Mod: ,,, | Performed by: FAMILY MEDICINE

## 2021-01-06 PROCEDURE — 99215 OFFICE O/P EST HI 40 MIN: CPT | Mod: ,,, | Performed by: FAMILY MEDICINE

## 2021-01-06 PROCEDURE — 87070 CULTURE OTHR SPECIMN AEROBIC: CPT

## 2021-01-06 PROCEDURE — 87186 SC STD MICRODIL/AGAR DIL: CPT

## 2021-01-06 PROCEDURE — 87205 SMEAR GRAM STAIN: CPT

## 2021-01-08 ENCOUNTER — HOSPITAL ENCOUNTER (OUTPATIENT)
Dept: PREADMISSION TESTING | Facility: HOSPITAL | Age: 61
Discharge: HOME | End: 2021-01-08
Attending: SURGERY
Payer: MEDICAID

## 2021-01-08 VITALS — HEIGHT: 70 IN | WEIGHT: 208 LBS | BODY MASS INDEX: 29.78 KG/M2

## 2021-01-08 DIAGNOSIS — I70.233 ATHEROSCLEROSIS OF NATIVE ARTERY OF RIGHT LOWER EXTREMITY WITH ULCERATION OF ANKLE: ICD-10-CM

## 2021-01-08 LAB
ANION GAP SERPL CALC-SCNC: 10 MMOL/L (ref 8–16)
APTT BLDCRRT: 32.9 SEC (ref 21–32)
BASOPHILS # BLD AUTO: 0.12 K/UL (ref 0–0.2)
BASOPHILS NFR BLD: 1 % (ref 0–1.9)
BUN SERPL-MCNC: 57 MG/DL (ref 8–23)
CALCIUM SERPL-MCNC: 8.8 MG/DL (ref 8.7–10.5)
CHLORIDE SERPL-SCNC: 100 MMOL/L (ref 95–110)
CO2 SERPL-SCNC: 28 MMOL/L (ref 23–29)
CREAT SERPL-MCNC: 1.6 MG/DL (ref 0.5–1.4)
DIFFERENTIAL METHOD: ABNORMAL
EOSINOPHIL # BLD AUTO: 0.2 K/UL (ref 0–0.5)
EOSINOPHIL NFR BLD: 1.3 % (ref 0–8)
ERYTHROCYTE [DISTWIDTH] IN BLOOD BY AUTOMATED COUNT: 19.9 % (ref 11.5–14.5)
EST. GFR  (AFRICAN AMERICAN): 53 ML/MIN/1.73 M^2
EST. GFR  (NON AFRICAN AMERICAN): 45 ML/MIN/1.73 M^2
GLUCOSE SERPL-MCNC: 100 MG/DL (ref 70–110)
HCT VFR BLD AUTO: 38.4 % (ref 40–54)
HGB BLD-MCNC: 12.1 G/DL (ref 14–18)
IMM GRANULOCYTES # BLD AUTO: 0.07 K/UL (ref 0–0.04)
IMM GRANULOCYTES NFR BLD AUTO: 0.6 % (ref 0–0.5)
INR PPP: 1 (ref 0.8–1.2)
LYMPHOCYTES # BLD AUTO: 0.4 K/UL (ref 1–4.8)
LYMPHOCYTES NFR BLD: 3.2 % (ref 18–48)
MCH RBC QN AUTO: 27.9 PG (ref 27–31)
MCHC RBC AUTO-ENTMCNC: 31.5 G/DL (ref 32–36)
MCV RBC AUTO: 89 FL (ref 82–98)
MONOCYTES # BLD AUTO: 0.9 K/UL (ref 0.3–1)
MONOCYTES NFR BLD: 8.1 % (ref 4–15)
NEUTROPHILS # BLD AUTO: 10 K/UL (ref 1.8–7.7)
NEUTROPHILS NFR BLD: 85.8 % (ref 38–73)
NRBC BLD-RTO: 0 /100 WBC
PLATELET # BLD AUTO: 333 K/UL (ref 150–350)
PMV BLD AUTO: 9.4 FL (ref 9.2–12.9)
POTASSIUM SERPL-SCNC: 4.7 MMOL/L (ref 3.5–5.1)
PROTHROMBIN TIME: 11.4 SEC (ref 9–12.5)
RBC # BLD AUTO: 4.33 M/UL (ref 4.6–6.2)
SODIUM SERPL-SCNC: 138 MMOL/L (ref 136–145)
WBC # BLD AUTO: 11.66 K/UL (ref 3.9–12.7)

## 2021-01-08 PROCEDURE — 85730 THROMBOPLASTIN TIME PARTIAL: CPT

## 2021-01-08 PROCEDURE — 85610 PROTHROMBIN TIME: CPT

## 2021-01-08 PROCEDURE — 80048 BASIC METABOLIC PNL TOTAL CA: CPT

## 2021-01-08 PROCEDURE — 36415 COLL VENOUS BLD VENIPUNCTURE: CPT

## 2021-01-08 PROCEDURE — 85025 COMPLETE CBC W/AUTO DIFF WBC: CPT

## 2021-01-10 LAB
BACTERIA TISS AEROBE CULT: ABNORMAL
GRAM STN SPEC: ABNORMAL

## 2021-01-12 ENCOUNTER — HOSPITAL ENCOUNTER (OUTPATIENT)
Dept: INTERVENTIONAL RADIOLOGY/VASCULAR | Facility: HOSPITAL | Age: 61
Discharge: HOME | End: 2021-01-12
Attending: FAMILY MEDICINE
Payer: MEDICAID

## 2021-01-12 ENCOUNTER — HOSPITAL ENCOUNTER (OUTPATIENT)
Dept: PREADMISSION TESTING | Facility: HOSPITAL | Age: 61
Discharge: HOME | End: 2021-01-12
Attending: SURGERY
Payer: MEDICAID

## 2021-01-12 ENCOUNTER — HOSPITAL ENCOUNTER (OUTPATIENT)
Facility: HOSPITAL | Age: 61
Discharge: HOME | End: 2021-01-12
Attending: RADIOLOGY | Admitting: RADIOLOGY
Payer: MEDICAID

## 2021-01-12 VITALS — OXYGEN SATURATION: 100 % | DIASTOLIC BLOOD PRESSURE: 65 MMHG | SYSTOLIC BLOOD PRESSURE: 126 MMHG

## 2021-01-12 DIAGNOSIS — Z01.812 ENCOUNTER FOR PREOPERATIVE SCREENING LABORATORY TESTING FOR SEVERE ACUTE RESPIRATORY SYNDROME CORONAVIRUS 2 (SARS-COV-2): ICD-10-CM

## 2021-01-12 DIAGNOSIS — Z11.52 ENCOUNTER FOR PREOPERATIVE SCREENING LABORATORY TESTING FOR SEVERE ACUTE RESPIRATORY SYNDROME CORONAVIRUS 2 (SARS-COV-2): ICD-10-CM

## 2021-01-12 DIAGNOSIS — R18.8 OTHER ASCITES: ICD-10-CM

## 2021-01-12 LAB — SARS-COV-2 RDRP RESP QL NAA+PROBE: NEGATIVE

## 2021-01-12 PROCEDURE — U0002 COVID-19 LAB TEST NON-CDC: HCPCS

## 2021-01-12 PROCEDURE — 49083 IR PARACENTESIS WITH IMAGING: ICD-10-PCS | Mod: ,,, | Performed by: RADIOLOGY

## 2021-01-12 PROCEDURE — A7048 VACUUM DRAIN BOTTLE/TUBE KIT: HCPCS

## 2021-01-12 PROCEDURE — 49083 ABD PARACENTESIS W/IMAGING: CPT | Performed by: RADIOLOGY

## 2021-01-13 ENCOUNTER — HOSPITAL ENCOUNTER (OUTPATIENT)
Facility: HOSPITAL | Age: 61
Discharge: HOME | End: 2021-01-13
Attending: SURGERY | Admitting: SURGERY
Payer: MEDICAID

## 2021-01-13 ENCOUNTER — HOSPITAL ENCOUNTER (OUTPATIENT)
Dept: INTERVENTIONAL RADIOLOGY/VASCULAR | Facility: HOSPITAL | Age: 61
Discharge: HOME | End: 2021-01-13
Attending: SURGERY | Admitting: SURGERY
Payer: MEDICAID

## 2021-01-13 VITALS
DIASTOLIC BLOOD PRESSURE: 62 MMHG | WEIGHT: 207.88 LBS | TEMPERATURE: 97 F | SYSTOLIC BLOOD PRESSURE: 106 MMHG | HEART RATE: 82 BPM | OXYGEN SATURATION: 96 % | RESPIRATION RATE: 18 BRPM | BODY MASS INDEX: 29.83 KG/M2

## 2021-01-13 VITALS
DIASTOLIC BLOOD PRESSURE: 76 MMHG | OXYGEN SATURATION: 100 % | SYSTOLIC BLOOD PRESSURE: 116 MMHG | HEART RATE: 71 BPM | RESPIRATION RATE: 16 BRPM

## 2021-01-13 DIAGNOSIS — I70.234 ATHEROSCLEROSIS OF NATIVE ARTERY OF RIGHT LOWER EXTREMITY WITH ULCERATION OF HEEL: Primary | ICD-10-CM

## 2021-01-13 DIAGNOSIS — Z01.812 ENCOUNTER FOR PREOPERATIVE SCREENING LABORATORY TESTING FOR SEVERE ACUTE RESPIRATORY SYNDROME CORONAVIRUS 2 (SARS-COV-2): ICD-10-CM

## 2021-01-13 DIAGNOSIS — I70.233 ATHEROSCLEROSIS OF NATIVE ARTERY OF RIGHT LOWER EXTREMITY WITH ULCERATION OF ANKLE: ICD-10-CM

## 2021-01-13 DIAGNOSIS — I70.239 ATHEROSCLEROSIS OF NATIVE ARTERY OF RIGHT LEG WITH ULCERATION: ICD-10-CM

## 2021-01-13 DIAGNOSIS — Z11.52 ENCOUNTER FOR PREOPERATIVE SCREENING LABORATORY TESTING FOR SEVERE ACUTE RESPIRATORY SYNDROME CORONAVIRUS 2 (SARS-COV-2): ICD-10-CM

## 2021-01-13 DIAGNOSIS — L97.509 DIABETES MELLITUS DUE TO UNDERLYING CONDITION WITH FOOT ULCER, WITHOUT LONG-TERM CURRENT USE OF INSULIN: ICD-10-CM

## 2021-01-13 DIAGNOSIS — E08.621 DIABETES MELLITUS DUE TO UNDERLYING CONDITION WITH FOOT ULCER, WITHOUT LONG-TERM CURRENT USE OF INSULIN: ICD-10-CM

## 2021-01-13 LAB — POCT GLUCOSE: 145 MG/DL (ref 70–110)

## 2021-01-13 PROCEDURE — 37224 PR FEM/POPL REVAS W/TLA: ICD-10-PCS | Mod: RT,,, | Performed by: SURGERY

## 2021-01-13 PROCEDURE — 99152 PR MOD CONSCIOUS SEDATION, SAME PHYS, 5+ YRS, FIRST 15 MIN: ICD-10-PCS | Mod: ,,, | Performed by: SURGERY

## 2021-01-13 PROCEDURE — 75710 ARTERY X-RAYS ARM/LEG: CPT | Mod: 59 | Performed by: SURGERY

## 2021-01-13 PROCEDURE — 37228 HC TIB/PER REVASC W/TLA: CPT

## 2021-01-13 PROCEDURE — 37224 HC FEM/POPL REVAS W/TLA: CPT | Performed by: SURGERY

## 2021-01-13 PROCEDURE — 99153 MOD SED SAME PHYS/QHP EA: CPT | Performed by: SURGERY

## 2021-01-13 PROCEDURE — 99152 MOD SED SAME PHYS/QHP 5/>YRS: CPT | Performed by: SURGERY

## 2021-01-13 PROCEDURE — 75710 ARTERY X-RAYS ARM/LEG: CPT | Mod: 26,59,, | Performed by: SURGERY

## 2021-01-13 PROCEDURE — 25500020 PHARM REV CODE 255: Performed by: SURGERY

## 2021-01-13 PROCEDURE — C1894 INTRO/SHEATH, NON-LASER: HCPCS

## 2021-01-13 PROCEDURE — 25000003 PHARM REV CODE 250: Performed by: SURGERY

## 2021-01-13 PROCEDURE — 75710 PR  ANGIO EXTREMITY UNILAT: ICD-10-PCS | Mod: 26,59,, | Performed by: SURGERY

## 2021-01-13 PROCEDURE — 37224 PR FEM/POPL REVAS W/TLA: CPT | Mod: RT,,, | Performed by: SURGERY

## 2021-01-13 PROCEDURE — 63600175 PHARM REV CODE 636 W HCPCS: Performed by: SURGERY

## 2021-01-13 PROCEDURE — 99152 MOD SED SAME PHYS/QHP 5/>YRS: CPT | Mod: ,,, | Performed by: SURGERY

## 2021-01-13 RX ORDER — CLINDAMYCIN PHOSPHATE 900 MG/50ML
900 INJECTION, SOLUTION INTRAVENOUS
Status: ACTIVE | OUTPATIENT
Start: 2021-01-13 | End: 2021-01-14

## 2021-01-13 RX ORDER — MIDAZOLAM HYDROCHLORIDE 1 MG/ML
INJECTION INTRAMUSCULAR; INTRAVENOUS CODE/TRAUMA/SEDATION MEDICATION
Status: COMPLETED | OUTPATIENT
Start: 2021-01-13 | End: 2021-01-13

## 2021-01-13 RX ORDER — CLINDAMYCIN PHOSPHATE 600 MG/50ML
INJECTION, SOLUTION INTRAVENOUS
Status: COMPLETED | OUTPATIENT
Start: 2021-01-13 | End: 2021-01-13

## 2021-01-13 RX ORDER — FENTANYL CITRATE 50 UG/ML
INJECTION, SOLUTION INTRAMUSCULAR; INTRAVENOUS CODE/TRAUMA/SEDATION MEDICATION
Status: COMPLETED | OUTPATIENT
Start: 2021-01-13 | End: 2021-01-13

## 2021-01-13 RX ORDER — SODIUM CHLORIDE 9 MG/ML
INJECTION, SOLUTION INTRAVENOUS CONTINUOUS
Status: DISCONTINUED | OUTPATIENT
Start: 2021-01-13 | End: 2021-03-30

## 2021-01-13 RX ORDER — HEPARIN SODIUM 1000 [USP'U]/ML
INJECTION, SOLUTION INTRAVENOUS; SUBCUTANEOUS CODE/TRAUMA/SEDATION MEDICATION
Status: COMPLETED | OUTPATIENT
Start: 2021-01-13 | End: 2021-01-13

## 2021-01-13 RX ORDER — PROTAMINE SULFATE 10 MG/ML
INJECTION, SOLUTION INTRAVENOUS CODE/TRAUMA/SEDATION MEDICATION
Status: COMPLETED | OUTPATIENT
Start: 2021-01-13 | End: 2021-01-13

## 2021-01-13 RX ORDER — ACETAMINOPHEN 325 MG/1
650 TABLET ORAL EVERY 6 HOURS PRN
COMMUNITY

## 2021-01-13 RX ADMIN — CLINDAMYCIN PHOSPHATE 600 MG: 12 INJECTION, SOLUTION INTRAVENOUS at 02:01

## 2021-01-13 RX ADMIN — FENTANYL CITRATE 50 MCG: 50 INJECTION, SOLUTION INTRAMUSCULAR; INTRAVENOUS at 02:01

## 2021-01-13 RX ADMIN — HEPARIN SODIUM 1000 UNITS: 1000 INJECTION, SOLUTION INTRAVENOUS; SUBCUTANEOUS at 03:01

## 2021-01-13 RX ADMIN — SODIUM CHLORIDE 75 ML/HR: 0.9 INJECTION, SOLUTION INTRAVENOUS at 01:01

## 2021-01-13 RX ADMIN — FENTANYL CITRATE 50 MCG: 50 INJECTION, SOLUTION INTRAMUSCULAR; INTRAVENOUS at 03:01

## 2021-01-13 RX ADMIN — FENTANYL CITRATE 25 MCG: 50 INJECTION, SOLUTION INTRAMUSCULAR; INTRAVENOUS at 02:01

## 2021-01-13 RX ADMIN — MIDAZOLAM HYDROCHLORIDE 1 MG: 1 INJECTION, SOLUTION INTRAMUSCULAR; INTRAVENOUS at 03:01

## 2021-01-13 RX ADMIN — PROTAMINE SULFATE 35 MG: 10 INJECTION, SOLUTION INTRAVENOUS at 05:01

## 2021-01-13 RX ADMIN — MIDAZOLAM HYDROCHLORIDE 1 MG: 1 INJECTION, SOLUTION INTRAMUSCULAR; INTRAVENOUS at 02:01

## 2021-01-13 RX ADMIN — IOHEXOL 120 ML: 300 INJECTION, SOLUTION INTRAVENOUS at 05:01

## 2021-01-13 RX ADMIN — HEPARIN SODIUM 2000 UNITS: 1000 INJECTION, SOLUTION INTRAVENOUS; SUBCUTANEOUS at 02:01

## 2021-01-13 RX ADMIN — HEPARIN SODIUM 9000 UNITS: 1000 INJECTION, SOLUTION INTRAVENOUS; SUBCUTANEOUS at 02:01

## 2021-01-14 ENCOUNTER — OFFICE VISIT (OUTPATIENT)
Dept: INFECTIOUS DISEASES | Facility: CLINIC | Age: 61
End: 2021-01-14
Payer: MEDICAID

## 2021-01-14 VITALS
WEIGHT: 185.88 LBS | SYSTOLIC BLOOD PRESSURE: 108 MMHG | HEIGHT: 70 IN | TEMPERATURE: 98 F | HEART RATE: 79 BPM | DIASTOLIC BLOOD PRESSURE: 68 MMHG | BODY MASS INDEX: 26.61 KG/M2

## 2021-01-14 DIAGNOSIS — M86.271 SUBACUTE OSTEOMYELITIS OF RIGHT FOOT: Primary | ICD-10-CM

## 2021-01-14 LAB
GRAM STN SPEC: NORMAL

## 2021-01-14 PROCEDURE — 99214 PR OFFICE/OUTPT VISIT, EST, LEVL IV, 30-39 MIN: ICD-10-PCS | Mod: S$PBB,,, | Performed by: INTERNAL MEDICINE

## 2021-01-14 PROCEDURE — 87070 CULTURE OTHR SPECIMN AEROBIC: CPT | Mod: 59

## 2021-01-14 PROCEDURE — 87075 CULTR BACTERIA EXCEPT BLOOD: CPT | Mod: 59

## 2021-01-14 PROCEDURE — 87077 CULTURE AEROBIC IDENTIFY: CPT | Mod: 59

## 2021-01-14 PROCEDURE — 99999 PR PBB SHADOW E&M-EST. PATIENT-LVL IV: CPT | Mod: PBBFAC,,, | Performed by: INTERNAL MEDICINE

## 2021-01-14 PROCEDURE — 87205 SMEAR GRAM STAIN: CPT

## 2021-01-14 PROCEDURE — 87186 SC STD MICRODIL/AGAR DIL: CPT | Mod: 59

## 2021-01-14 PROCEDURE — 99999 PR PBB SHADOW E&M-EST. PATIENT-LVL IV: ICD-10-PCS | Mod: PBBFAC,,, | Performed by: INTERNAL MEDICINE

## 2021-01-14 PROCEDURE — 99214 OFFICE O/P EST MOD 30 MIN: CPT | Mod: S$PBB,,, | Performed by: INTERNAL MEDICINE

## 2021-01-14 PROCEDURE — 99214 OFFICE O/P EST MOD 30 MIN: CPT | Mod: PBBFAC | Performed by: INTERNAL MEDICINE

## 2021-01-14 RX ORDER — CLOPIDOGREL BISULFATE 75 MG/1
75 TABLET ORAL DAILY
Qty: 30 TABLET | Refills: 11 | Status: SHIPPED | OUTPATIENT
Start: 2021-01-14 | End: 2022-01-01

## 2021-01-15 ENCOUNTER — TELEPHONE (OUTPATIENT)
Dept: INFECTIOUS DISEASES | Facility: CLINIC | Age: 61
End: 2021-01-15

## 2021-01-18 ENCOUNTER — PATIENT MESSAGE (OUTPATIENT)
Dept: VASCULAR SURGERY | Facility: CLINIC | Age: 61
End: 2021-01-18

## 2021-01-18 LAB
BACTERIA SPEC AEROBE CULT: ABNORMAL
FUNGUS SPEC CULT: NORMAL

## 2021-01-19 ENCOUNTER — PATIENT MESSAGE (OUTPATIENT)
Dept: INFECTIOUS DISEASES | Facility: CLINIC | Age: 61
End: 2021-01-19

## 2021-01-19 LAB
BACTERIA SPEC ANAEROBE CULT: NORMAL
BACTERIA SPEC ANAEROBE CULT: NORMAL

## 2021-01-19 RX ORDER — OXYCODONE AND ACETAMINOPHEN 5; 325 MG/1; MG/1
1 TABLET ORAL EVERY 12 HOURS PRN
Qty: 30 TABLET | Refills: 0 | Status: SHIPPED | OUTPATIENT
Start: 2021-01-19 | End: 2021-02-02

## 2021-01-20 ENCOUNTER — OFFICE VISIT (OUTPATIENT)
Dept: VASCULAR SURGERY | Facility: CLINIC | Age: 61
End: 2021-01-20
Payer: MEDICAID

## 2021-01-20 VITALS
WEIGHT: 185.88 LBS | DIASTOLIC BLOOD PRESSURE: 64 MMHG | HEART RATE: 73 BPM | HEIGHT: 70 IN | SYSTOLIC BLOOD PRESSURE: 92 MMHG | OXYGEN SATURATION: 97 % | BODY MASS INDEX: 26.61 KG/M2

## 2021-01-20 DIAGNOSIS — I70.234 ATHEROSCLEROSIS OF NATIVE ARTERY OF RIGHT LOWER EXTREMITY WITH ULCERATION OF HEEL: Primary | ICD-10-CM

## 2021-01-20 PROCEDURE — 99999 PR PBB SHADOW E&M-EST. PATIENT-LVL V: CPT | Mod: PBBFAC,,, | Performed by: SURGERY

## 2021-01-20 PROCEDURE — 97598 PR DEBRIDEMENT OPEN WOUND EA ADDL 20 SQ CM: ICD-10-PCS | Mod: S$PBB,,, | Performed by: SURGERY

## 2021-01-20 PROCEDURE — 99215 OFFICE O/P EST HI 40 MIN: CPT | Mod: PBBFAC,25 | Performed by: SURGERY

## 2021-01-20 PROCEDURE — 99215 OFFICE O/P EST HI 40 MIN: CPT | Mod: 25,S$PBB,, | Performed by: SURGERY

## 2021-01-20 PROCEDURE — 97597 PR DEBRIDEMENT OPEN WOUND 20 SQ CM<: ICD-10-PCS | Mod: S$PBB,,, | Performed by: SURGERY

## 2021-01-20 PROCEDURE — 97598 DBRDMT OPN WND ADDL 20CM/<: CPT | Mod: S$PBB,,, | Performed by: SURGERY

## 2021-01-20 PROCEDURE — 99215 PR OFFICE/OUTPT VISIT, EST, LEVL V, 40-54 MIN: ICD-10-PCS | Mod: 25,S$PBB,, | Performed by: SURGERY

## 2021-01-20 PROCEDURE — 99999 PR PBB SHADOW E&M-EST. PATIENT-LVL V: ICD-10-PCS | Mod: PBBFAC,,, | Performed by: SURGERY

## 2021-01-20 PROCEDURE — 97597 DBRDMT OPN WND 1ST 20 CM/<: CPT | Mod: PBBFAC | Performed by: SURGERY

## 2021-01-20 PROCEDURE — 97598 DBRDMT OPN WND ADDL 20CM/<: CPT | Mod: PBBFAC | Performed by: SURGERY

## 2021-01-20 PROCEDURE — 97597 DBRDMT OPN WND 1ST 20 CM/<: CPT | Mod: S$PBB,,, | Performed by: SURGERY

## 2021-01-21 ENCOUNTER — TELEPHONE (OUTPATIENT)
Dept: WOUND CARE | Facility: HOSPITAL | Age: 61
End: 2021-01-21

## 2021-01-21 LAB — BACTERIA SPEC AEROBE CULT: ABNORMAL

## 2021-01-22 ENCOUNTER — TELEPHONE (OUTPATIENT)
Dept: VASCULAR SURGERY | Facility: CLINIC | Age: 61
End: 2021-01-22

## 2021-01-22 ENCOUNTER — LAB VISIT (OUTPATIENT)
Dept: LAB | Facility: HOSPITAL | Age: 61
End: 2021-01-22
Attending: INTERNAL MEDICINE
Payer: MEDICAID

## 2021-01-22 ENCOUNTER — TELEPHONE (OUTPATIENT)
Dept: INFECTIOUS DISEASES | Facility: CLINIC | Age: 61
End: 2021-01-22

## 2021-01-22 DIAGNOSIS — I70.239 ATHEROSCLEROSIS OF NATIVE ARTERY OF RIGHT LEG WITH ULCERATION: ICD-10-CM

## 2021-01-22 DIAGNOSIS — M86.271 SUBACUTE OSTEOMYELITIS OF RIGHT FOOT: ICD-10-CM

## 2021-01-22 DIAGNOSIS — I70.233 ATHEROSCLEROSIS OF NATIVE ARTERY OF RIGHT LOWER EXTREMITY WITH ULCERATION OF ANKLE: Primary | ICD-10-CM

## 2021-01-22 DIAGNOSIS — I70.234 ATHEROSCLEROSIS OF NATIVE ARTERY OF RIGHT LOWER EXTREMITY WITH ULCERATION OF HEEL: Primary | ICD-10-CM

## 2021-01-22 LAB
ALBUMIN SERPL BCP-MCNC: 2 G/DL (ref 3.5–5.2)
ALP SERPL-CCNC: 280 U/L (ref 55–135)
ALT SERPL W/O P-5'-P-CCNC: 26 U/L (ref 10–44)
ANION GAP SERPL CALC-SCNC: 8 MMOL/L (ref 8–16)
AST SERPL-CCNC: 28 U/L (ref 10–40)
BASOPHILS # BLD AUTO: 0.14 K/UL (ref 0–0.2)
BASOPHILS NFR BLD: 1.1 % (ref 0–1.9)
BILIRUB SERPL-MCNC: 0.5 MG/DL (ref 0.1–1)
BUN SERPL-MCNC: 66 MG/DL (ref 8–23)
CALCIUM SERPL-MCNC: 8.6 MG/DL (ref 8.7–10.5)
CHLORIDE SERPL-SCNC: 102 MMOL/L (ref 95–110)
CO2 SERPL-SCNC: 28 MMOL/L (ref 23–29)
CREAT SERPL-MCNC: 1.5 MG/DL (ref 0.5–1.4)
CRP SERPL-MCNC: 156.7 MG/L (ref 0–8.2)
DIFFERENTIAL METHOD: ABNORMAL
EOSINOPHIL # BLD AUTO: 0.1 K/UL (ref 0–0.5)
EOSINOPHIL NFR BLD: 0.8 % (ref 0–8)
ERYTHROCYTE [DISTWIDTH] IN BLOOD BY AUTOMATED COUNT: 19.2 % (ref 11.5–14.5)
EST. GFR  (AFRICAN AMERICAN): 57 ML/MIN/1.73 M^2
EST. GFR  (NON AFRICAN AMERICAN): 49 ML/MIN/1.73 M^2
GLUCOSE SERPL-MCNC: 121 MG/DL (ref 70–110)
HCT VFR BLD AUTO: 33.5 % (ref 40–54)
HGB BLD-MCNC: 11 G/DL (ref 14–18)
IMM GRANULOCYTES # BLD AUTO: 0.05 K/UL (ref 0–0.04)
IMM GRANULOCYTES NFR BLD AUTO: 0.4 % (ref 0–0.5)
LYMPHOCYTES # BLD AUTO: 0.5 K/UL (ref 1–4.8)
LYMPHOCYTES NFR BLD: 4.3 % (ref 18–48)
MCH RBC QN AUTO: 28.3 PG (ref 27–31)
MCHC RBC AUTO-ENTMCNC: 32.8 G/DL (ref 32–36)
MCV RBC AUTO: 86 FL (ref 82–98)
MONOCYTES # BLD AUTO: 1.1 K/UL (ref 0.3–1)
MONOCYTES NFR BLD: 9.2 % (ref 4–15)
NEUTROPHILS # BLD AUTO: 10.4 K/UL (ref 1.8–7.7)
NEUTROPHILS NFR BLD: 84.2 % (ref 38–73)
NRBC BLD-RTO: 0 /100 WBC
PLATELET # BLD AUTO: 470 K/UL (ref 150–350)
PMV BLD AUTO: 9.4 FL (ref 9.2–12.9)
POTASSIUM SERPL-SCNC: 4.4 MMOL/L (ref 3.5–5.1)
PROT SERPL-MCNC: 6.8 G/DL (ref 6–8.4)
RBC # BLD AUTO: 3.89 M/UL (ref 4.6–6.2)
SODIUM SERPL-SCNC: 138 MMOL/L (ref 136–145)
WBC # BLD AUTO: 12.3 K/UL (ref 3.9–12.7)

## 2021-01-22 PROCEDURE — 85025 COMPLETE CBC W/AUTO DIFF WBC: CPT

## 2021-01-22 PROCEDURE — 86140 C-REACTIVE PROTEIN: CPT

## 2021-01-22 PROCEDURE — 80053 COMPREHEN METABOLIC PANEL: CPT

## 2021-01-22 PROCEDURE — 36415 COLL VENOUS BLD VENIPUNCTURE: CPT | Mod: PN

## 2021-01-25 ENCOUNTER — HOSPITAL ENCOUNTER (OUTPATIENT)
Dept: RADIOLOGY | Facility: HOSPITAL | Age: 61
Discharge: HOME | End: 2021-01-25
Attending: SURGERY
Payer: MEDICAID

## 2021-01-25 ENCOUNTER — HOSPITAL ENCOUNTER (OUTPATIENT)
Dept: PREADMISSION TESTING | Facility: HOSPITAL | Age: 61
Discharge: HOME | End: 2021-01-25
Attending: SURGERY
Payer: MEDICAID

## 2021-01-25 VITALS
RESPIRATION RATE: 16 BRPM | DIASTOLIC BLOOD PRESSURE: 64 MMHG | HEIGHT: 70 IN | HEART RATE: 78 BPM | TEMPERATURE: 100 F | BODY MASS INDEX: 26.51 KG/M2 | WEIGHT: 185.19 LBS | SYSTOLIC BLOOD PRESSURE: 114 MMHG | OXYGEN SATURATION: 97 %

## 2021-01-25 DIAGNOSIS — Z01.818 PREOP TESTING: ICD-10-CM

## 2021-01-25 DIAGNOSIS — I70.233 ATHEROSCLEROSIS OF NATIVE ARTERY OF RIGHT LOWER EXTREMITY WITH ULCERATION OF ANKLE: ICD-10-CM

## 2021-01-25 LAB — SARS-COV-2 RDRP RESP QL NAA+PROBE: NEGATIVE

## 2021-01-25 PROCEDURE — 71046 X-RAY EXAM CHEST 2 VIEWS: CPT | Mod: 26,,, | Performed by: RADIOLOGY

## 2021-01-25 PROCEDURE — U0002 COVID-19 LAB TEST NON-CDC: HCPCS

## 2021-01-25 PROCEDURE — 71046 XR CHEST PA AND LATERAL PRE-OP: ICD-10-PCS | Mod: 26,,, | Performed by: RADIOLOGY

## 2021-01-25 PROCEDURE — 71046 X-RAY EXAM CHEST 2 VIEWS: CPT | Mod: TC,FY

## 2021-01-25 PROCEDURE — 93010 EKG 12-LEAD: ICD-10-PCS | Mod: ,,, | Performed by: INTERNAL MEDICINE

## 2021-01-25 PROCEDURE — 93005 ELECTROCARDIOGRAM TRACING: CPT

## 2021-01-25 PROCEDURE — 93010 ELECTROCARDIOGRAM REPORT: CPT | Mod: ,,, | Performed by: INTERNAL MEDICINE

## 2021-01-25 RX ORDER — SODIUM CHLORIDE 9 MG/ML
INJECTION, SOLUTION INTRAVENOUS CONTINUOUS
Status: CANCELLED | OUTPATIENT
Start: 2021-01-25

## 2021-01-25 RX ORDER — CLINDAMYCIN PHOSPHATE 900 MG/50ML
900 INJECTION, SOLUTION INTRAVENOUS
Status: CANCELLED | OUTPATIENT
Start: 2021-01-25 | End: 2021-01-25

## 2021-01-26 ENCOUNTER — HOSPITAL ENCOUNTER (OUTPATIENT)
Dept: INTERVENTIONAL RADIOLOGY/VASCULAR | Facility: HOSPITAL | Age: 61
Discharge: HOME | End: 2021-01-26
Attending: SURGERY | Admitting: SURGERY
Payer: MEDICAID

## 2021-01-26 ENCOUNTER — ANESTHESIA EVENT (OUTPATIENT)
Dept: SURGERY | Facility: HOSPITAL | Age: 61
End: 2021-01-26
Payer: MEDICAID

## 2021-01-26 ENCOUNTER — HOSPITAL ENCOUNTER (OUTPATIENT)
Facility: HOSPITAL | Age: 61
Discharge: HOME | End: 2021-01-26
Attending: SURGERY | Admitting: SURGERY
Payer: MEDICAID

## 2021-01-26 ENCOUNTER — TELEPHONE (OUTPATIENT)
Dept: INFECTIOUS DISEASES | Facility: CLINIC | Age: 61
End: 2021-01-26

## 2021-01-26 VITALS
HEART RATE: 78 BPM | RESPIRATION RATE: 14 BRPM | OXYGEN SATURATION: 98 % | DIASTOLIC BLOOD PRESSURE: 67 MMHG | SYSTOLIC BLOOD PRESSURE: 132 MMHG

## 2021-01-26 VITALS
RESPIRATION RATE: 16 BRPM | HEART RATE: 87 BPM | WEIGHT: 185.19 LBS | SYSTOLIC BLOOD PRESSURE: 117 MMHG | DIASTOLIC BLOOD PRESSURE: 65 MMHG | OXYGEN SATURATION: 96 % | TEMPERATURE: 98 F | BODY MASS INDEX: 26.57 KG/M2

## 2021-01-26 DIAGNOSIS — E11.42 DM TYPE 2 WITH DIABETIC PERIPHERAL NEUROPATHY: ICD-10-CM

## 2021-01-26 DIAGNOSIS — I70.239 ATHEROSCLEROSIS OF NATIVE ARTERY OF RIGHT LEG WITH ULCERATION: ICD-10-CM

## 2021-01-26 DIAGNOSIS — I70.234 ATHEROSCLEROSIS OF NATIVE ARTERY OF RIGHT LOWER EXTREMITY WITH ULCERATION OF HEEL: ICD-10-CM

## 2021-01-26 DIAGNOSIS — Z01.818 PREOP TESTING: Primary | ICD-10-CM

## 2021-01-26 LAB
FUNGUS SPEC CULT: ABNORMAL
POCT GLUCOSE: 130 MG/DL (ref 70–110)

## 2021-01-26 PROCEDURE — 25000003 PHARM REV CODE 250: Performed by: SURGERY

## 2021-01-26 PROCEDURE — 37226 PR FEM/POPL REVASC W/STENT: ICD-10-PCS | Mod: 51,RT,, | Performed by: SURGERY

## 2021-01-26 PROCEDURE — C1877 STENT, NON-COAT/COV W/O DEL: HCPCS

## 2021-01-26 PROCEDURE — 37226 HC FEM/POPL REVASC W/STENT: CPT | Performed by: SURGERY

## 2021-01-26 PROCEDURE — 63600175 PHARM REV CODE 636 W HCPCS: Performed by: SURGERY

## 2021-01-26 PROCEDURE — 75710 PR  ANGIO EXTREMITY UNILAT: ICD-10-PCS | Mod: 26,59,, | Performed by: SURGERY

## 2021-01-26 PROCEDURE — 37230 HC TIB/PER REVASC W/STENT: CPT | Performed by: SURGERY

## 2021-01-26 PROCEDURE — 25500020 PHARM REV CODE 255: Performed by: SURGERY

## 2021-01-26 PROCEDURE — 99152 MOD SED SAME PHYS/QHP 5/>YRS: CPT | Mod: ,,, | Performed by: SURGERY

## 2021-01-26 PROCEDURE — 99153 MOD SED SAME PHYS/QHP EA: CPT | Performed by: SURGERY

## 2021-01-26 PROCEDURE — C1894 INTRO/SHEATH, NON-LASER: HCPCS

## 2021-01-26 PROCEDURE — 99152 MOD SED SAME PHYS/QHP 5/>YRS: CPT | Performed by: SURGERY

## 2021-01-26 PROCEDURE — 37230 PR TIB/PER REVASC W/STENT: ICD-10-PCS | Mod: RT,,, | Performed by: SURGERY

## 2021-01-26 PROCEDURE — 37230 PR TIB/PER REVASC W/STENT: CPT | Mod: RT,,, | Performed by: SURGERY

## 2021-01-26 PROCEDURE — 99152 PR MOD CONSCIOUS SEDATION, SAME PHYS, 5+ YRS, FIRST 15 MIN: ICD-10-PCS | Mod: ,,, | Performed by: SURGERY

## 2021-01-26 PROCEDURE — 82962 GLUCOSE BLOOD TEST: CPT | Performed by: SURGERY

## 2021-01-26 PROCEDURE — 75710 ARTERY X-RAYS ARM/LEG: CPT | Mod: 26,59,, | Performed by: SURGERY

## 2021-01-26 PROCEDURE — 75710 ARTERY X-RAYS ARM/LEG: CPT | Performed by: SURGERY

## 2021-01-26 PROCEDURE — 37226 PR FEM/POPL REVASC W/STENT: CPT | Mod: 51,RT,, | Performed by: SURGERY

## 2021-01-26 RX ORDER — SODIUM CHLORIDE 9 MG/ML
INJECTION, SOLUTION INTRAVENOUS CONTINUOUS
Status: DISCONTINUED | OUTPATIENT
Start: 2021-01-26 | End: 2021-01-26 | Stop reason: HOSPADM

## 2021-01-26 RX ORDER — FENTANYL CITRATE 50 UG/ML
INJECTION, SOLUTION INTRAMUSCULAR; INTRAVENOUS CODE/TRAUMA/SEDATION MEDICATION
Status: COMPLETED | OUTPATIENT
Start: 2021-01-26 | End: 2021-01-26

## 2021-01-26 RX ORDER — MIDAZOLAM HYDROCHLORIDE 1 MG/ML
INJECTION INTRAMUSCULAR; INTRAVENOUS CODE/TRAUMA/SEDATION MEDICATION
Status: COMPLETED | OUTPATIENT
Start: 2021-01-26 | End: 2021-01-26

## 2021-01-26 RX ORDER — CLINDAMYCIN PHOSPHATE 900 MG/50ML
900 INJECTION, SOLUTION INTRAVENOUS
Status: COMPLETED | OUTPATIENT
Start: 2021-01-26 | End: 2021-01-26

## 2021-01-26 RX ORDER — HEPARIN SODIUM 1000 [USP'U]/ML
INJECTION, SOLUTION INTRAVENOUS; SUBCUTANEOUS CODE/TRAUMA/SEDATION MEDICATION
Status: COMPLETED | OUTPATIENT
Start: 2021-01-26 | End: 2021-01-26

## 2021-01-26 RX ADMIN — HEPARIN SODIUM 2000 UNITS: 1000 INJECTION, SOLUTION INTRAVENOUS; SUBCUTANEOUS at 11:01

## 2021-01-26 RX ADMIN — MIDAZOLAM HYDROCHLORIDE 1 MG: 1 INJECTION, SOLUTION INTRAMUSCULAR; INTRAVENOUS at 11:01

## 2021-01-26 RX ADMIN — FENTANYL CITRATE 50 MCG: 50 INJECTION, SOLUTION INTRAMUSCULAR; INTRAVENOUS at 11:01

## 2021-01-26 RX ADMIN — CLINDAMYCIN PHOSPHATE 900 MG: 18 INJECTION, SOLUTION INTRAVENOUS at 10:01

## 2021-01-26 RX ADMIN — FENTANYL CITRATE 50 MCG: 50 INJECTION, SOLUTION INTRAMUSCULAR; INTRAVENOUS at 12:01

## 2021-01-26 RX ADMIN — SODIUM CHLORIDE: 0.9 INJECTION, SOLUTION INTRAVENOUS at 10:01

## 2021-01-26 RX ADMIN — HEPARIN SODIUM 8000 UNITS: 1000 INJECTION, SOLUTION INTRAVENOUS; SUBCUTANEOUS at 11:01

## 2021-01-26 RX ADMIN — HEPARIN SODIUM 1000 UNITS: 1000 INJECTION, SOLUTION INTRAVENOUS; SUBCUTANEOUS at 12:01

## 2021-01-26 RX ADMIN — IOHEXOL 62 ML: 300 INJECTION, SOLUTION INTRAVENOUS at 01:01

## 2021-01-27 ENCOUNTER — TELEPHONE (OUTPATIENT)
Dept: INFECTIOUS DISEASES | Facility: CLINIC | Age: 61
End: 2021-01-27

## 2021-01-27 ENCOUNTER — HOSPITAL ENCOUNTER (OUTPATIENT)
Facility: HOSPITAL | Age: 61
Discharge: HOME | End: 2021-01-27
Attending: SURGERY | Admitting: SURGERY
Payer: MEDICAID

## 2021-01-27 ENCOUNTER — DOCUMENTATION ONLY (OUTPATIENT)
Dept: ELECTROPHYSIOLOGY | Facility: CLINIC | Age: 61
End: 2021-01-27

## 2021-01-27 ENCOUNTER — ANESTHESIA (OUTPATIENT)
Dept: SURGERY | Facility: HOSPITAL | Age: 61
End: 2021-01-27
Payer: MEDICAID

## 2021-01-27 VITALS
HEIGHT: 70 IN | RESPIRATION RATE: 20 BRPM | OXYGEN SATURATION: 100 % | WEIGHT: 185 LBS | HEART RATE: 65 BPM | DIASTOLIC BLOOD PRESSURE: 58 MMHG | SYSTOLIC BLOOD PRESSURE: 100 MMHG | TEMPERATURE: 97 F | BODY MASS INDEX: 26.48 KG/M2

## 2021-01-27 DIAGNOSIS — L97.919 DIABETIC ULCER OF RIGHT LOWER LEG: ICD-10-CM

## 2021-01-27 DIAGNOSIS — E11.621 DIABETIC ULCER OF TOE OF RIGHT FOOT ASSOCIATED WITH TYPE 2 DIABETES MELLITUS, WITH BONE INVOLVEMENT WITHOUT EVIDENCE OF NECROSIS: ICD-10-CM

## 2021-01-27 DIAGNOSIS — I73.9 PVD (PERIPHERAL VASCULAR DISEASE): Primary | ICD-10-CM

## 2021-01-27 DIAGNOSIS — E11.622 DIABETIC ULCER OF RIGHT LOWER LEG: ICD-10-CM

## 2021-01-27 DIAGNOSIS — I73.9 PVD (PERIPHERAL VASCULAR DISEASE): ICD-10-CM

## 2021-01-27 DIAGNOSIS — I70.234 ATHEROSCLEROSIS OF NATIVE ARTERY OF RIGHT LOWER EXTREMITY WITH ULCERATION OF HEEL: Primary | ICD-10-CM

## 2021-01-27 DIAGNOSIS — E11.621 TYPE 2 DIABETES MELLITUS WITH RIGHT DIABETIC FOOT ULCER: ICD-10-CM

## 2021-01-27 DIAGNOSIS — L97.519 TYPE 2 DIABETES MELLITUS WITH RIGHT DIABETIC FOOT ULCER: ICD-10-CM

## 2021-01-27 DIAGNOSIS — M86.271 SUBACUTE OSTEOMYELITIS OF RIGHT FOOT: Primary | ICD-10-CM

## 2021-01-27 DIAGNOSIS — L97.516 DIABETIC ULCER OF TOE OF RIGHT FOOT ASSOCIATED WITH TYPE 2 DIABETES MELLITUS, WITH BONE INVOLVEMENT WITHOUT EVIDENCE OF NECROSIS: ICD-10-CM

## 2021-01-27 LAB
GRAM STN SPEC: NORMAL
POCT GLUCOSE: 174 MG/DL (ref 70–110)
POCT GLUCOSE: 175 MG/DL (ref 70–110)
POCT GLUCOSE: 187 MG/DL (ref 70–110)

## 2021-01-27 PROCEDURE — 64445 NJX AA&/STRD SCIATIC NRV IMG: CPT | Mod: 59,RT | Performed by: STUDENT IN AN ORGANIZED HEALTH CARE EDUCATION/TRAINING PROGRAM

## 2021-01-27 PROCEDURE — 71000015 HC POSTOP RECOV 1ST HR: Performed by: SURGERY

## 2021-01-27 PROCEDURE — 71000044 HC DOSC ROUTINE RECOVERY FIRST HOUR: Performed by: SURGERY

## 2021-01-27 PROCEDURE — 87206 SMEAR FLUORESCENT/ACID STAI: CPT

## 2021-01-27 PROCEDURE — 37000009 HC ANESTHESIA EA ADD 15 MINS: Performed by: SURGERY

## 2021-01-27 PROCEDURE — 64450 NJX AA&/STRD OTHER PN/BRANCH: CPT | Mod: 59,RT,, | Performed by: ANESTHESIOLOGY

## 2021-01-27 PROCEDURE — 87205 SMEAR GRAM STAIN: CPT

## 2021-01-27 PROCEDURE — 64450 SAPHENOUS NERVE BLOCK: ICD-10-PCS | Mod: 59,RT,, | Performed by: ANESTHESIOLOGY

## 2021-01-27 PROCEDURE — 01480 ANES OPEN PX LOWER L/A/F NOS: CPT | Performed by: SURGERY

## 2021-01-27 PROCEDURE — 76942 ECHO GUIDE FOR BIOPSY: CPT | Performed by: STUDENT IN AN ORGANIZED HEALTH CARE EDUCATION/TRAINING PROGRAM

## 2021-01-27 PROCEDURE — 25000003 PHARM REV CODE 250: Performed by: STUDENT IN AN ORGANIZED HEALTH CARE EDUCATION/TRAINING PROGRAM

## 2021-01-27 PROCEDURE — 87077 CULTURE AEROBIC IDENTIFY: CPT | Mod: 59

## 2021-01-27 PROCEDURE — 25000003 PHARM REV CODE 250: Performed by: ANESTHESIOLOGY

## 2021-01-27 PROCEDURE — 64445 POPLITEAL SCIATIC SINGLE INJECTION BLOCK: ICD-10-PCS | Mod: 59,RT,, | Performed by: ANESTHESIOLOGY

## 2021-01-27 PROCEDURE — D9220A PRA ANESTHESIA: Mod: CRNA,,, | Performed by: NURSE ANESTHETIST, CERTIFIED REGISTERED

## 2021-01-27 PROCEDURE — 37000008 HC ANESTHESIA 1ST 15 MINUTES: Performed by: SURGERY

## 2021-01-27 PROCEDURE — D9220A PRA ANESTHESIA: ICD-10-PCS | Mod: ANES,,, | Performed by: ANESTHESIOLOGY

## 2021-01-27 PROCEDURE — A4216 STERILE WATER/SALINE, 10 ML: HCPCS | Performed by: NURSE ANESTHETIST, CERTIFIED REGISTERED

## 2021-01-27 PROCEDURE — 87075 CULTR BACTERIA EXCEPT BLOOD: CPT

## 2021-01-27 PROCEDURE — 25000003 PHARM REV CODE 250: Performed by: NURSE ANESTHETIST, CERTIFIED REGISTERED

## 2021-01-27 PROCEDURE — 27201423 OPTIME MED/SURG SUP & DEVICES STERILE SUPPLY: Performed by: SURGERY

## 2021-01-27 PROCEDURE — 36000706: Performed by: SURGERY

## 2021-01-27 PROCEDURE — 63600175 PHARM REV CODE 636 W HCPCS: Performed by: NURSE ANESTHETIST, CERTIFIED REGISTERED

## 2021-01-27 PROCEDURE — 27200750 HC INSULATED NEEDLE/ STIMUPLEX: Performed by: STUDENT IN AN ORGANIZED HEALTH CARE EDUCATION/TRAINING PROGRAM

## 2021-01-27 PROCEDURE — 87106 FUNGI IDENTIFICATION YEAST: CPT

## 2021-01-27 PROCEDURE — D9220A PRA ANESTHESIA: ICD-10-PCS | Mod: CRNA,,, | Performed by: NURSE ANESTHETIST, CERTIFIED REGISTERED

## 2021-01-27 PROCEDURE — 36000707: Performed by: SURGERY

## 2021-01-27 PROCEDURE — 87186 SC STD MICRODIL/AGAR DIL: CPT

## 2021-01-27 PROCEDURE — 87176 TISSUE HOMOGENIZATION CULTR: CPT

## 2021-01-27 PROCEDURE — 11044 DBRDMT BONE 1ST 20 SQ CM/<: CPT | Mod: ,,, | Performed by: SURGERY

## 2021-01-27 PROCEDURE — 64445 NJX AA&/STRD SCIATIC NRV IMG: CPT | Mod: 59,RT,, | Performed by: ANESTHESIOLOGY

## 2021-01-27 PROCEDURE — 82962 GLUCOSE BLOOD TEST: CPT | Performed by: SURGERY

## 2021-01-27 PROCEDURE — 63600175 PHARM REV CODE 636 W HCPCS: Performed by: STUDENT IN AN ORGANIZED HEALTH CARE EDUCATION/TRAINING PROGRAM

## 2021-01-27 PROCEDURE — 87015 SPECIMEN INFECT AGNT CONCNTJ: CPT

## 2021-01-27 PROCEDURE — D9220A PRA ANESTHESIA: Mod: ANES,,, | Performed by: ANESTHESIOLOGY

## 2021-01-27 PROCEDURE — 25000003 PHARM REV CODE 250: Performed by: SURGERY

## 2021-01-27 PROCEDURE — 76942 ECHO GUIDE FOR BIOPSY: CPT | Mod: 26,,, | Performed by: ANESTHESIOLOGY

## 2021-01-27 PROCEDURE — 87070 CULTURE OTHR SPECIMN AEROBIC: CPT

## 2021-01-27 PROCEDURE — 87116 MYCOBACTERIA CULTURE: CPT

## 2021-01-27 PROCEDURE — 64447 NJX AA&/STRD FEMORAL NRV IMG: CPT | Mod: 59,RT | Performed by: STUDENT IN AN ORGANIZED HEALTH CARE EDUCATION/TRAINING PROGRAM

## 2021-01-27 PROCEDURE — 76942 POPLITEAL SCIATIC SINGLE INJECTION BLOCK: ICD-10-PCS | Mod: 26,,, | Performed by: ANESTHESIOLOGY

## 2021-01-27 PROCEDURE — 87102 FUNGUS ISOLATION CULTURE: CPT

## 2021-01-27 PROCEDURE — 11044 PR DEBRIDEMENT, SKIN, SUB-Q TISSUE,MUSCLE,BONE,=<20 SQ CM: ICD-10-PCS | Mod: ,,, | Performed by: SURGERY

## 2021-01-27 PROCEDURE — 71000045 HC DOSC ROUTINE RECOVERY EA ADD'L HR: Performed by: SURGERY

## 2021-01-27 RX ORDER — OXYCODONE HYDROCHLORIDE 5 MG/1
5 TABLET ORAL
Status: DISCONTINUED | OUTPATIENT
Start: 2021-01-27 | End: 2021-01-27 | Stop reason: HOSPADM

## 2021-01-27 RX ORDER — ONDANSETRON 2 MG/ML
INJECTION INTRAMUSCULAR; INTRAVENOUS
Status: DISCONTINUED | OUTPATIENT
Start: 2021-01-27 | End: 2021-01-27

## 2021-01-27 RX ORDER — BUPIVACAINE HYDROCHLORIDE 5 MG/ML
INJECTION, SOLUTION EPIDURAL; INTRACAUDAL
Status: COMPLETED | OUTPATIENT
Start: 2021-01-27 | End: 2021-01-27

## 2021-01-27 RX ORDER — DEXMEDETOMIDINE HYDROCHLORIDE 100 UG/ML
INJECTION, SOLUTION INTRAVENOUS
Status: DISCONTINUED | OUTPATIENT
Start: 2021-01-27 | End: 2021-01-27

## 2021-01-27 RX ORDER — HYDROCODONE BITARTRATE AND ACETAMINOPHEN 7.5; 325 MG/1; MG/1
1 TABLET ORAL EVERY 6 HOURS PRN
Qty: 28 TABLET | Refills: 0 | Status: SHIPPED | OUTPATIENT
Start: 2021-01-27 | End: 2021-01-27

## 2021-01-27 RX ORDER — MIDAZOLAM HYDROCHLORIDE 1 MG/ML
INJECTION INTRAMUSCULAR; INTRAVENOUS
Status: DISCONTINUED | OUTPATIENT
Start: 2021-01-27 | End: 2021-01-27

## 2021-01-27 RX ORDER — SODIUM CHLORIDE 0.9 % (FLUSH) 0.9 %
10 SYRINGE (ML) INJECTION
Status: DISCONTINUED | OUTPATIENT
Start: 2021-01-27 | End: 2021-01-27 | Stop reason: HOSPADM

## 2021-01-27 RX ORDER — ONDANSETRON 2 MG/ML
4 INJECTION INTRAMUSCULAR; INTRAVENOUS ONCE AS NEEDED
Status: DISCONTINUED | OUTPATIENT
Start: 2021-01-27 | End: 2021-01-27 | Stop reason: HOSPADM

## 2021-01-27 RX ORDER — HYDROCODONE BITARTRATE AND ACETAMINOPHEN 7.5; 325 MG/1; MG/1
1 TABLET ORAL EVERY 6 HOURS PRN
Qty: 28 TABLET | Refills: 0 | Status: SHIPPED | OUTPATIENT
Start: 2021-01-27 | End: 2021-01-27 | Stop reason: SDUPTHER

## 2021-01-27 RX ORDER — HYDROCODONE BITARTRATE AND ACETAMINOPHEN 7.5; 325 MG/1; MG/1
1 TABLET ORAL EVERY 6 HOURS PRN
Qty: 24 TABLET | Refills: 0 | Status: SHIPPED | OUTPATIENT
Start: 2021-01-27 | End: 2021-01-27

## 2021-01-27 RX ORDER — HYDROCODONE BITARTRATE AND ACETAMINOPHEN 7.5; 325 MG/1; MG/1
1 TABLET ORAL EVERY 6 HOURS PRN
Qty: 27 TABLET | Refills: 0 | Status: SHIPPED | OUTPATIENT
Start: 2021-01-27 | End: 2021-01-27 | Stop reason: HOSPADM

## 2021-01-27 RX ORDER — HYDROCODONE BITARTRATE AND ACETAMINOPHEN 7.5; 325 MG/1; MG/1
1 TABLET ORAL EVERY 6 HOURS PRN
Qty: 27 TABLET | Refills: 0 | Status: SHIPPED | OUTPATIENT
Start: 2021-01-27 | End: 2021-01-01

## 2021-01-27 RX ORDER — SODIUM CHLORIDE 9 MG/ML
INJECTION, SOLUTION INTRAVENOUS CONTINUOUS
Status: DISCONTINUED | OUTPATIENT
Start: 2021-01-27 | End: 2021-01-27 | Stop reason: HOSPADM

## 2021-01-27 RX ORDER — FENTANYL CITRATE 50 UG/ML
25 INJECTION, SOLUTION INTRAMUSCULAR; INTRAVENOUS EVERY 5 MIN PRN
Status: DISCONTINUED | OUTPATIENT
Start: 2021-01-27 | End: 2021-01-27 | Stop reason: HOSPADM

## 2021-01-27 RX ORDER — MIDAZOLAM HYDROCHLORIDE 1 MG/ML
0.5 INJECTION INTRAMUSCULAR; INTRAVENOUS
Status: DISCONTINUED | OUTPATIENT
Start: 2021-01-27 | End: 2021-01-27 | Stop reason: HOSPADM

## 2021-01-27 RX ADMIN — CLINDAMYCIN IN 5 PERCENT DEXTROSE 900 MG: 18 INJECTION, SOLUTION INTRAVENOUS at 05:01

## 2021-01-27 RX ADMIN — DEXMEDETOMIDINE HYDROCHLORIDE 1 MCG/KG/HR: 100 INJECTION, SOLUTION, CONCENTRATE INTRAVENOUS at 05:01

## 2021-01-27 RX ADMIN — SODIUM CHLORIDE: 0.9 INJECTION, SOLUTION INTRAVENOUS at 04:01

## 2021-01-27 RX ADMIN — MIDAZOLAM HYDROCHLORIDE 2 MG: 1 INJECTION, SOLUTION INTRAMUSCULAR; INTRAVENOUS at 04:01

## 2021-01-27 RX ADMIN — ONDANSETRON 4 MG: 2 INJECTION INTRAMUSCULAR; INTRAVENOUS at 05:01

## 2021-01-27 RX ADMIN — BUPIVACAINE HYDROCHLORIDE 15 ML: 5 INJECTION, SOLUTION EPIDURAL; INTRACAUDAL; PERINEURAL at 01:01

## 2021-01-27 RX ADMIN — DEXMEDETOMIDINE HYDROCHLORIDE 12 MCG: 100 INJECTION, SOLUTION INTRAVENOUS at 05:01

## 2021-01-27 RX ADMIN — MIDAZOLAM 1 MG: 1 INJECTION INTRAMUSCULAR; INTRAVENOUS at 12:01

## 2021-01-27 RX ADMIN — FENTANYL CITRATE 50 MCG: 50 INJECTION, SOLUTION INTRAMUSCULAR; INTRAVENOUS at 12:01

## 2021-01-27 RX ADMIN — BUPIVACAINE HYDROCHLORIDE 30 ML: 5 INJECTION, SOLUTION EPIDURAL; INTRACAUDAL; PERINEURAL at 01:01

## 2021-01-28 ENCOUNTER — PATIENT MESSAGE (OUTPATIENT)
Dept: INFECTIOUS DISEASES | Facility: CLINIC | Age: 61
End: 2021-01-28

## 2021-01-28 ENCOUNTER — TELEPHONE (OUTPATIENT)
Dept: INFECTIOUS DISEASES | Facility: CLINIC | Age: 61
End: 2021-01-28

## 2021-01-28 ENCOUNTER — TELEPHONE (OUTPATIENT)
Dept: VASCULAR SURGERY | Facility: CLINIC | Age: 61
End: 2021-01-28

## 2021-01-28 DIAGNOSIS — M86.271 SUBACUTE OSTEOMYELITIS OF RIGHT FOOT: Primary | ICD-10-CM

## 2021-01-28 RX ORDER — CIPROFLOXACIN 500 MG/1
500 TABLET ORAL EVERY 12 HOURS
Qty: 20 TABLET | Refills: 0 | Status: SHIPPED | OUTPATIENT
Start: 2021-01-28 | End: 2021-02-05

## 2021-01-30 LAB
BACTERIA SPEC AEROBE CULT: ABNORMAL

## 2021-02-01 ENCOUNTER — PATIENT MESSAGE (OUTPATIENT)
Dept: INFECTIOUS DISEASES | Facility: CLINIC | Age: 61
End: 2021-02-01

## 2021-02-01 ENCOUNTER — HOSPITAL ENCOUNTER (OUTPATIENT)
Dept: INTERVENTIONAL RADIOLOGY/VASCULAR | Facility: HOSPITAL | Age: 61
Discharge: HOME | End: 2021-02-01
Attending: INTERNAL MEDICINE
Payer: MEDICAID

## 2021-02-01 ENCOUNTER — HOSPITAL ENCOUNTER (OUTPATIENT)
Dept: INTERVENTIONAL RADIOLOGY/VASCULAR | Facility: HOSPITAL | Age: 61
Discharge: HOME | End: 2021-02-01
Attending: FAMILY MEDICINE
Payer: MEDICAID

## 2021-02-01 ENCOUNTER — OFFICE VISIT (OUTPATIENT)
Dept: VASCULAR SURGERY | Facility: CLINIC | Age: 61
End: 2021-02-01
Payer: MEDICAID

## 2021-02-01 VITALS
DIASTOLIC BLOOD PRESSURE: 77 MMHG | RESPIRATION RATE: 14 BRPM | HEART RATE: 81 BPM | OXYGEN SATURATION: 96 % | SYSTOLIC BLOOD PRESSURE: 131 MMHG

## 2021-02-01 VITALS
WEIGHT: 185 LBS | SYSTOLIC BLOOD PRESSURE: 127 MMHG | RESPIRATION RATE: 14 BRPM | HEIGHT: 70 IN | BODY MASS INDEX: 26.48 KG/M2 | HEART RATE: 77 BPM | DIASTOLIC BLOOD PRESSURE: 65 MMHG

## 2021-02-01 DIAGNOSIS — M86.271 SUBACUTE OSTEOMYELITIS OF RIGHT FOOT: ICD-10-CM

## 2021-02-01 DIAGNOSIS — I70.234 ATHEROSCLEROSIS OF NATIVE ARTERY OF RIGHT LOWER EXTREMITY WITH ULCERATION OF HEEL: Primary | ICD-10-CM

## 2021-02-01 DIAGNOSIS — R18.8 OTHER ASCITES: ICD-10-CM

## 2021-02-01 DIAGNOSIS — I70.239 ATHEROSCLEROSIS OF NATIVE ARTERY OF RIGHT LEG WITH ULCERATION: ICD-10-CM

## 2021-02-01 LAB — BACTERIA SPEC ANAEROBE CULT: NORMAL

## 2021-02-01 PROCEDURE — 49083 IR PARACENTESIS WITH IMAGING: ICD-10-PCS | Mod: ,,, | Performed by: RADIOLOGY

## 2021-02-01 PROCEDURE — 36573 IR PICC LINE PLACEMENT W/O PORT, W/IMG > 5 Y/O: ICD-10-PCS | Mod: LT,,, | Performed by: RADIOLOGY

## 2021-02-01 PROCEDURE — 99215 OFFICE O/P EST HI 40 MIN: CPT | Mod: 57,S$PBB,, | Performed by: SURGERY

## 2021-02-01 PROCEDURE — 99999 PR PBB SHADOW E&M-EST. PATIENT-LVL V: ICD-10-PCS | Mod: PBBFAC,,, | Performed by: SURGERY

## 2021-02-01 PROCEDURE — C1894 INTRO/SHEATH, NON-LASER: HCPCS

## 2021-02-01 PROCEDURE — 99215 OFFICE O/P EST HI 40 MIN: CPT | Mod: PBBFAC,25 | Performed by: SURGERY

## 2021-02-01 PROCEDURE — 99215 PR OFFICE/OUTPT VISIT, EST, LEVL V, 40-54 MIN: ICD-10-PCS | Mod: 57,S$PBB,, | Performed by: SURGERY

## 2021-02-01 PROCEDURE — 36573 INSJ PICC RS&I 5 YR+: CPT | Mod: LT,,, | Performed by: RADIOLOGY

## 2021-02-01 PROCEDURE — 49083 ABD PARACENTESIS W/IMAGING: CPT | Performed by: RADIOLOGY

## 2021-02-01 PROCEDURE — A4550 SURGICAL TRAYS: HCPCS

## 2021-02-01 PROCEDURE — A7048 VACUUM DRAIN BOTTLE/TUBE KIT: HCPCS

## 2021-02-01 PROCEDURE — 99999 PR PBB SHADOW E&M-EST. PATIENT-LVL V: CPT | Mod: PBBFAC,,, | Performed by: SURGERY

## 2021-02-02 ENCOUNTER — ANESTHESIA EVENT (OUTPATIENT)
Dept: SURGERY | Facility: HOSPITAL | Age: 61
End: 2021-02-02
Payer: MEDICAID

## 2021-02-02 ENCOUNTER — HOSPITAL ENCOUNTER (OUTPATIENT)
Dept: PREADMISSION TESTING | Facility: HOSPITAL | Age: 61
Discharge: HOME | End: 2021-02-02
Attending: SURGERY
Payer: MEDICAID

## 2021-02-02 ENCOUNTER — TELEPHONE (OUTPATIENT)
Dept: INFECTIOUS DISEASES | Facility: CLINIC | Age: 61
End: 2021-02-02

## 2021-02-02 VITALS
DIASTOLIC BLOOD PRESSURE: 61 MMHG | OXYGEN SATURATION: 100 % | HEART RATE: 65 BPM | RESPIRATION RATE: 18 BRPM | SYSTOLIC BLOOD PRESSURE: 98 MMHG | BODY MASS INDEX: 26.77 KG/M2 | WEIGHT: 187 LBS | HEIGHT: 70 IN

## 2021-02-02 DIAGNOSIS — Z01.818 PREOP TESTING: ICD-10-CM

## 2021-02-02 LAB — SARS-COV-2 RDRP RESP QL NAA+PROBE: NEGATIVE

## 2021-02-02 PROCEDURE — U0002 COVID-19 LAB TEST NON-CDC: HCPCS

## 2021-02-03 ENCOUNTER — TELEPHONE (OUTPATIENT)
Dept: INFECTIOUS DISEASES | Facility: CLINIC | Age: 61
End: 2021-02-03

## 2021-02-04 ENCOUNTER — HOSPITAL ENCOUNTER (OUTPATIENT)
Facility: HOSPITAL | Age: 61
Discharge: HOME | End: 2021-02-04
Attending: SURGERY | Admitting: SURGERY
Payer: MEDICAID

## 2021-02-04 ENCOUNTER — TELEPHONE (OUTPATIENT)
Dept: RESEARCH | Facility: HOSPITAL | Age: 61
End: 2021-02-04

## 2021-02-04 ENCOUNTER — ANESTHESIA (OUTPATIENT)
Dept: SURGERY | Facility: HOSPITAL | Age: 61
End: 2021-02-04
Payer: MEDICAID

## 2021-02-04 VITALS
DIASTOLIC BLOOD PRESSURE: 61 MMHG | SYSTOLIC BLOOD PRESSURE: 109 MMHG | HEART RATE: 68 BPM | WEIGHT: 186.94 LBS | RESPIRATION RATE: 18 BRPM | OXYGEN SATURATION: 99 % | TEMPERATURE: 98 F | BODY MASS INDEX: 26.82 KG/M2

## 2021-02-04 DIAGNOSIS — Z01.818 PREOP TESTING: ICD-10-CM

## 2021-02-04 DIAGNOSIS — Z11.52 ENCOUNTER FOR PREOPERATIVE SCREENING LABORATORY TESTING FOR COVID-19 VIRUS: ICD-10-CM

## 2021-02-04 DIAGNOSIS — E11.42 DM TYPE 2 WITH DIABETIC PERIPHERAL NEUROPATHY: ICD-10-CM

## 2021-02-04 DIAGNOSIS — Z01.812 ENCOUNTER FOR PREOPERATIVE SCREENING LABORATORY TESTING FOR COVID-19 VIRUS: ICD-10-CM

## 2021-02-04 DIAGNOSIS — I70.239 ATHEROSCLEROSIS OF NATIVE ARTERY OF RIGHT LEG WITH ULCERATION: ICD-10-CM

## 2021-02-04 DIAGNOSIS — I70.234 ATHEROSCLEROSIS OF NATIVE ARTERY OF RIGHT LOWER EXTREMITY WITH ULCERATION OF HEEL: Primary | ICD-10-CM

## 2021-02-04 LAB
POCT GLUCOSE: 121 MG/DL (ref 70–110)
POCT GLUCOSE: 151 MG/DL (ref 70–110)

## 2021-02-04 PROCEDURE — 25000003 PHARM REV CODE 250: Performed by: SURGERY

## 2021-02-04 PROCEDURE — 64445 NJX AA&/STRD SCIATIC NRV IMG: CPT | Performed by: ANESTHESIOLOGY

## 2021-02-04 PROCEDURE — 71000015 HC POSTOP RECOV 1ST HR: Performed by: SURGERY

## 2021-02-04 PROCEDURE — D9220A PRA ANESTHESIA: Mod: ,,, | Performed by: ANESTHESIOLOGY

## 2021-02-04 PROCEDURE — 63600175 PHARM REV CODE 636 W HCPCS: Performed by: REGISTERED NURSE

## 2021-02-04 PROCEDURE — 25000003 PHARM REV CODE 250: Performed by: REGISTERED NURSE

## 2021-02-04 PROCEDURE — 11044 PR DEBRIDEMENT, SKIN, SUB-Q TISSUE,MUSCLE,BONE,=<20 SQ CM: ICD-10-PCS | Mod: ,,, | Performed by: SURGERY

## 2021-02-04 PROCEDURE — 36000704 HC OR TIME LEV I 1ST 15 MIN: Performed by: SURGERY

## 2021-02-04 PROCEDURE — D9220A PRA ANESTHESIA: ICD-10-PCS | Mod: ,,, | Performed by: ANESTHESIOLOGY

## 2021-02-04 PROCEDURE — 27201423 OPTIME MED/SURG SUP & DEVICES STERILE SUPPLY: Performed by: SURGERY

## 2021-02-04 PROCEDURE — 37000009 HC ANESTHESIA EA ADD 15 MINS: Performed by: SURGERY

## 2021-02-04 PROCEDURE — 36000705 HC OR TIME LEV I EA ADD 15 MIN: Performed by: SURGERY

## 2021-02-04 PROCEDURE — 25000003 PHARM REV CODE 250: Performed by: ANESTHESIOLOGY

## 2021-02-04 PROCEDURE — 71000016 HC POSTOP RECOV ADDL HR: Performed by: SURGERY

## 2021-02-04 PROCEDURE — 63600175 PHARM REV CODE 636 W HCPCS: Performed by: ANESTHESIOLOGY

## 2021-02-04 PROCEDURE — 76942 ECHO GUIDE FOR BIOPSY: CPT | Performed by: ANESTHESIOLOGY

## 2021-02-04 PROCEDURE — 01480 ANES OPEN PX LOWER L/A/F NOS: CPT | Performed by: SURGERY

## 2021-02-04 PROCEDURE — 11044 DBRDMT BONE 1ST 20 SQ CM/<: CPT | Mod: ,,, | Performed by: SURGERY

## 2021-02-04 PROCEDURE — 37000008 HC ANESTHESIA 1ST 15 MINUTES: Performed by: SURGERY

## 2021-02-04 PROCEDURE — 71000033 HC RECOVERY, INTIAL HOUR: Performed by: SURGERY

## 2021-02-04 PROCEDURE — A4216 STERILE WATER/SALINE, 10 ML: HCPCS | Performed by: REGISTERED NURSE

## 2021-02-04 PROCEDURE — 82962 GLUCOSE BLOOD TEST: CPT | Performed by: SURGERY

## 2021-02-04 PROCEDURE — 71000039 HC RECOVERY, EACH ADD'L HOUR: Performed by: SURGERY

## 2021-02-04 RX ORDER — ROPIVACAINE HYDROCHLORIDE 5 MG/ML
INJECTION, SOLUTION EPIDURAL; INFILTRATION; PERINEURAL
Status: DISCONTINUED | OUTPATIENT
Start: 2021-02-04 | End: 2021-02-04

## 2021-02-04 RX ORDER — SODIUM CHLORIDE, SODIUM LACTATE, POTASSIUM CHLORIDE, CALCIUM CHLORIDE 600; 310; 30; 20 MG/100ML; MG/100ML; MG/100ML; MG/100ML
INJECTION, SOLUTION INTRAVENOUS CONTINUOUS
Status: DISCONTINUED | OUTPATIENT
Start: 2021-02-04 | End: 2021-02-04 | Stop reason: HOSPADM

## 2021-02-04 RX ORDER — PHENYLEPHRINE HYDROCHLORIDE 10 MG/ML
INJECTION INTRAVENOUS
Status: DISCONTINUED | OUTPATIENT
Start: 2021-02-04 | End: 2021-02-04

## 2021-02-04 RX ORDER — DEXMEDETOMIDINE HYDROCHLORIDE 100 UG/ML
INJECTION, SOLUTION INTRAVENOUS
Status: DISCONTINUED | OUTPATIENT
Start: 2021-02-04 | End: 2021-02-04

## 2021-02-04 RX ORDER — OXYCODONE HYDROCHLORIDE 5 MG/1
5 TABLET ORAL EVERY 6 HOURS PRN
Qty: 20 TABLET | Refills: 0 | Status: ON HOLD | OUTPATIENT
Start: 2021-02-04 | End: 2021-01-01

## 2021-02-04 RX ORDER — BUPIVACAINE HYDROCHLORIDE 5 MG/ML
INJECTION, SOLUTION EPIDURAL; INTRACAUDAL
Status: DISCONTINUED | OUTPATIENT
Start: 2021-02-04 | End: 2021-02-04

## 2021-02-04 RX ORDER — CLINDAMYCIN PHOSPHATE 900 MG/50ML
900 INJECTION, SOLUTION INTRAVENOUS
Status: ACTIVE | OUTPATIENT
Start: 2021-02-04 | End: 2021-02-04

## 2021-02-04 RX ORDER — SODIUM CHLORIDE 0.9 % (FLUSH) 0.9 %
10 SYRINGE (ML) INJECTION
Status: DISCONTINUED | OUTPATIENT
Start: 2021-02-04 | End: 2021-02-04 | Stop reason: HOSPADM

## 2021-02-04 RX ORDER — ACETAMINOPHEN 500 MG
1000 TABLET ORAL ONCE
Status: COMPLETED | OUTPATIENT
Start: 2021-02-04 | End: 2021-02-04

## 2021-02-04 RX ORDER — LIDOCAINE HYDROCHLORIDE 10 MG/ML
1 INJECTION, SOLUTION EPIDURAL; INFILTRATION; INTRACAUDAL; PERINEURAL ONCE
Status: DISCONTINUED | OUTPATIENT
Start: 2021-02-04 | End: 2021-02-04 | Stop reason: HOSPADM

## 2021-02-04 RX ORDER — MIDAZOLAM HYDROCHLORIDE 1 MG/ML
INJECTION, SOLUTION INTRAMUSCULAR; INTRAVENOUS
Status: DISCONTINUED | OUTPATIENT
Start: 2021-02-04 | End: 2021-02-04

## 2021-02-04 RX ORDER — HYDROMORPHONE HYDROCHLORIDE 2 MG/ML
0.2 INJECTION, SOLUTION INTRAMUSCULAR; INTRAVENOUS; SUBCUTANEOUS EVERY 5 MIN PRN
Status: DISCONTINUED | OUTPATIENT
Start: 2021-02-04 | End: 2021-02-04 | Stop reason: HOSPADM

## 2021-02-04 RX ADMIN — PHENYLEPHRINE HYDROCHLORIDE 50 MCG: 10 INJECTION INTRAVENOUS at 08:02

## 2021-02-04 RX ADMIN — BUPIVACAINE HYDROCHLORIDE 20 MG: 5 INJECTION, SOLUTION EPIDURAL; INTRACAUDAL; PERINEURAL at 07:02

## 2021-02-04 RX ADMIN — ROPIVACAINE HYDROCHLORIDE 15 MG: 5 INJECTION, SOLUTION EPIDURAL; INFILTRATION; PERINEURAL at 12:02

## 2021-02-04 RX ADMIN — DEXMEDETOMIDINE HYDROCHLORIDE 0.5 MCG/KG/HR: 100 INJECTION, SOLUTION, CONCENTRATE INTRAVENOUS at 07:02

## 2021-02-04 RX ADMIN — MIDAZOLAM HYDROCHLORIDE 1 MG: 1 INJECTION, SOLUTION INTRAMUSCULAR; INTRAVENOUS at 07:02

## 2021-02-04 RX ADMIN — ACETAMINOPHEN 1000 MG: 500 TABLET ORAL at 06:02

## 2021-02-04 RX ADMIN — DEXMEDETOMIDINE HYDROCHLORIDE 4 MCG: 100 INJECTION, SOLUTION INTRAVENOUS at 07:02

## 2021-02-04 RX ADMIN — ROPIVACAINE HYDROCHLORIDE 15 MG: 5 INJECTION, SOLUTION EPIDURAL; INFILTRATION; PERINEURAL at 07:02

## 2021-02-04 RX ADMIN — DEXMEDETOMIDINE HYDROCHLORIDE 8 MCG: 100 INJECTION, SOLUTION INTRAVENOUS at 07:02

## 2021-02-04 RX ADMIN — CLINDAMYCIN IN 5 PERCENT DEXTROSE 900 MG: 18 INJECTION, SOLUTION INTRAVENOUS at 07:02

## 2021-02-05 ENCOUNTER — TELEPHONE (OUTPATIENT)
Dept: RESEARCH | Facility: HOSPITAL | Age: 61
End: 2021-02-05

## 2021-02-05 ENCOUNTER — TELEPHONE (OUTPATIENT)
Dept: INFECTIOUS DISEASES | Facility: CLINIC | Age: 61
End: 2021-02-05

## 2021-02-05 DIAGNOSIS — M86.271 SUBACUTE OSTEOMYELITIS OF RIGHT FOOT: Primary | ICD-10-CM

## 2021-02-05 RX ORDER — FLUCONAZOLE 200 MG/1
200 TABLET ORAL DAILY
Qty: 30 TABLET | Refills: 0 | Status: SHIPPED | OUTPATIENT
Start: 2021-02-05 | End: 2021-03-07

## 2021-02-09 ENCOUNTER — PATIENT MESSAGE (OUTPATIENT)
Dept: FAMILY MEDICINE | Facility: CLINIC | Age: 61
End: 2021-02-09

## 2021-02-10 ENCOUNTER — HOSPITAL ENCOUNTER (OUTPATIENT)
Facility: HOSPITAL | Age: 61
Discharge: HOME | End: 2021-02-10
Attending: RADIOLOGY | Admitting: RADIOLOGY
Payer: MEDICAID

## 2021-02-10 ENCOUNTER — HOSPITAL ENCOUNTER (OUTPATIENT)
Dept: INTERVENTIONAL RADIOLOGY/VASCULAR | Facility: HOSPITAL | Age: 61
Discharge: HOME | End: 2021-02-10
Attending: FAMILY MEDICINE
Payer: MEDICAID

## 2021-02-10 ENCOUNTER — TELEPHONE (OUTPATIENT)
Dept: VASCULAR SURGERY | Facility: CLINIC | Age: 61
End: 2021-02-10

## 2021-02-10 VITALS
HEART RATE: 71 BPM | RESPIRATION RATE: 16 BRPM | DIASTOLIC BLOOD PRESSURE: 56 MMHG | SYSTOLIC BLOOD PRESSURE: 104 MMHG | OXYGEN SATURATION: 100 % | TEMPERATURE: 99 F

## 2021-02-10 VITALS — DIASTOLIC BLOOD PRESSURE: 59 MMHG | SYSTOLIC BLOOD PRESSURE: 108 MMHG

## 2021-02-10 DIAGNOSIS — I50.43 ACUTE ON CHRONIC COMBINED SYSTOLIC AND DIASTOLIC CONGESTIVE HEART FAILURE: ICD-10-CM

## 2021-02-10 DIAGNOSIS — R18.8 OTHER ASCITES: ICD-10-CM

## 2021-02-10 PROCEDURE — 49083 ABD PARACENTESIS W/IMAGING: CPT | Performed by: RADIOLOGY

## 2021-02-10 PROCEDURE — 63600175 PHARM REV CODE 636 W HCPCS: Mod: JG | Performed by: RADIOLOGY

## 2021-02-10 PROCEDURE — P9047 ALBUMIN (HUMAN), 25%, 50ML: HCPCS | Mod: JG | Performed by: RADIOLOGY

## 2021-02-10 PROCEDURE — A7048 VACUUM DRAIN BOTTLE/TUBE KIT: HCPCS

## 2021-02-10 PROCEDURE — 49083 IR PARACENTESIS WITH IMAGING: ICD-10-PCS | Mod: ,,, | Performed by: RADIOLOGY

## 2021-02-10 RX ORDER — ALBUMIN HUMAN 250 G/1000ML
37.5 SOLUTION INTRAVENOUS ONCE AS NEEDED
Status: CANCELLED | OUTPATIENT
Start: 2021-02-10 | End: 2032-07-09

## 2021-02-10 RX ORDER — TORSEMIDE 20 MG/1
80 TABLET ORAL 2 TIMES DAILY
Qty: 240 TABLET | Refills: 2 | Status: SHIPPED | OUTPATIENT
Start: 2021-02-10 | End: 2021-01-01

## 2021-02-10 RX ORDER — ALBUMIN HUMAN 250 G/1000ML
37.5 SOLUTION INTRAVENOUS ONCE AS NEEDED
Status: COMPLETED | OUTPATIENT
Start: 2021-02-10 | End: 2021-02-10

## 2021-02-10 RX ADMIN — ALBUMIN (HUMAN) 37.5 G: 0.25 INJECTION, SOLUTION INTRAVENOUS at 02:02

## 2021-02-11 ENCOUNTER — TELEPHONE (OUTPATIENT)
Dept: VASCULAR SURGERY | Facility: CLINIC | Age: 61
End: 2021-02-11

## 2021-02-11 DIAGNOSIS — I70.234 ATHEROSCLEROSIS OF NATIVE ARTERY OF RIGHT LOWER EXTREMITY WITH ULCERATION OF HEEL: Primary | ICD-10-CM

## 2021-02-12 DIAGNOSIS — H40.51X1 NEOVASCULAR GLAUCOMA, RIGHT EYE, MILD STAGE: ICD-10-CM

## 2021-02-12 DIAGNOSIS — H40.52X3 NEOVASCULAR GLAUCOMA OF LEFT EYE, SEVERE STAGE: ICD-10-CM

## 2021-02-12 RX ORDER — DORZOLAMIDE HYDROCHLORIDE AND TIMOLOL MALEATE 20; 5 MG/ML; MG/ML
1 SOLUTION/ DROPS OPHTHALMIC 3 TIMES DAILY
Qty: 10 ML | Refills: 6 | Status: SHIPPED | OUTPATIENT
Start: 2021-02-12 | End: 2021-01-01 | Stop reason: SDUPTHER

## 2021-02-18 ENCOUNTER — HOSPITAL ENCOUNTER (OUTPATIENT)
Dept: WOUND CARE | Facility: HOSPITAL | Age: 61
Discharge: HOME | End: 2021-02-18
Attending: INTERNAL MEDICINE
Payer: MEDICAID

## 2021-02-18 ENCOUNTER — HOSPITAL ENCOUNTER (OUTPATIENT)
Dept: INTERVENTIONAL RADIOLOGY/VASCULAR | Facility: HOSPITAL | Age: 61
Discharge: HOME | End: 2021-02-18
Attending: FAMILY MEDICINE | Admitting: RADIOLOGY
Payer: MEDICAID

## 2021-02-18 VITALS — TEMPERATURE: 98 F | SYSTOLIC BLOOD PRESSURE: 124 MMHG | DIASTOLIC BLOOD PRESSURE: 74 MMHG | HEART RATE: 87 BPM

## 2021-02-18 VITALS — SYSTOLIC BLOOD PRESSURE: 129 MMHG | DIASTOLIC BLOOD PRESSURE: 70 MMHG

## 2021-02-18 DIAGNOSIS — R18.8 OTHER ASCITES: ICD-10-CM

## 2021-02-18 DIAGNOSIS — E11.621 TYPE 2 DIABETES MELLITUS WITH LEFT DIABETIC FOOT ULCER: Primary | ICD-10-CM

## 2021-02-18 DIAGNOSIS — L97.529 TYPE 2 DIABETES MELLITUS WITH LEFT DIABETIC FOOT ULCER: Primary | ICD-10-CM

## 2021-02-18 PROCEDURE — 11042 DBRDMT SUBQ TIS 1ST 20SQCM/<: CPT | Performed by: INTERNAL MEDICINE

## 2021-02-18 PROCEDURE — 99214 PR OFFICE/OUTPT VISIT, EST, LEVL IV, 30-39 MIN: ICD-10-PCS | Mod: 25,,, | Performed by: INTERNAL MEDICINE

## 2021-02-18 PROCEDURE — 49083 IR PARACENTESIS WITH IMAGING: ICD-10-PCS | Mod: ,,, | Performed by: RADIOLOGY

## 2021-02-18 PROCEDURE — 87205 SMEAR GRAM STAIN: CPT

## 2021-02-18 PROCEDURE — 11042 DBRDMT SUBQ TIS 1ST 20SQCM/<: CPT | Mod: ,,, | Performed by: INTERNAL MEDICINE

## 2021-02-18 PROCEDURE — A7048 VACUUM DRAIN BOTTLE/TUBE KIT: HCPCS

## 2021-02-18 PROCEDURE — 11042 WOUND DEBRIDEMENT: ICD-10-PCS | Mod: ,,, | Performed by: INTERNAL MEDICINE

## 2021-02-18 PROCEDURE — 99214 OFFICE O/P EST MOD 30 MIN: CPT | Mod: 25,,, | Performed by: INTERNAL MEDICINE

## 2021-02-18 PROCEDURE — 49083 ABD PARACENTESIS W/IMAGING: CPT | Performed by: RADIOLOGY

## 2021-02-18 PROCEDURE — 99213 OFFICE O/P EST LOW 20 MIN: CPT | Performed by: INTERNAL MEDICINE

## 2021-02-18 PROCEDURE — 87070 CULTURE OTHR SPECIMN AEROBIC: CPT | Mod: 59

## 2021-02-19 ENCOUNTER — HOSPITAL ENCOUNTER (OUTPATIENT)
Dept: RADIOLOGY | Facility: HOSPITAL | Age: 61
Discharge: HOME | End: 2021-02-19
Attending: SURGERY
Payer: MEDICAID

## 2021-02-19 DIAGNOSIS — I70.234 ATHEROSCLEROSIS OF NATIVE ARTERY OF RIGHT LOWER EXTREMITY WITH ULCERATION OF HEEL: ICD-10-CM

## 2021-02-19 PROCEDURE — 93922 US ARTERIAL LOWER EXTREMITY RIGHT WITH ABI (XPD): ICD-10-PCS | Mod: 26,,, | Performed by: RADIOLOGY

## 2021-02-19 PROCEDURE — 93926 LOWER EXTREMITY STUDY: CPT | Mod: 26,,, | Performed by: RADIOLOGY

## 2021-02-19 PROCEDURE — 93922 UPR/L XTREMITY ART 2 LEVELS: CPT | Mod: 26,,, | Performed by: RADIOLOGY

## 2021-02-19 PROCEDURE — 93926 US ARTERIAL LOWER EXTREMITY RIGHT WITH ABI (XPD): ICD-10-PCS | Mod: 26,,, | Performed by: RADIOLOGY

## 2021-02-19 PROCEDURE — 93922 UPR/L XTREMITY ART 2 LEVELS: CPT | Mod: TC

## 2021-02-20 LAB
BACTERIA TISS AEROBE CULT: NORMAL
GRAM STN SPEC: NORMAL
GRAM STN SPEC: NORMAL

## 2021-02-22 ENCOUNTER — OFFICE VISIT (OUTPATIENT)
Dept: VASCULAR SURGERY | Facility: CLINIC | Age: 61
End: 2021-02-22
Payer: MEDICAID

## 2021-02-22 VITALS
SYSTOLIC BLOOD PRESSURE: 100 MMHG | WEIGHT: 185.19 LBS | BODY MASS INDEX: 26.51 KG/M2 | DIASTOLIC BLOOD PRESSURE: 60 MMHG | HEIGHT: 70 IN

## 2021-02-22 DIAGNOSIS — S91.301A OPEN WOUND OF RIGHT FOOT: ICD-10-CM

## 2021-02-22 DIAGNOSIS — S91.301D OPEN WOUND OF RIGHT HEEL, SUBSEQUENT ENCOUNTER: Primary | ICD-10-CM

## 2021-02-22 PROCEDURE — 99999 PR PBB SHADOW E&M-EST. PATIENT-LVL V: ICD-10-PCS | Mod: PBBFAC,,, | Performed by: SURGERY

## 2021-02-22 PROCEDURE — 99024 POSTOP FOLLOW-UP VISIT: CPT | Mod: ,,, | Performed by: SURGERY

## 2021-02-22 PROCEDURE — 99215 OFFICE O/P EST HI 40 MIN: CPT | Mod: PBBFAC | Performed by: SURGERY

## 2021-02-22 PROCEDURE — 99999 PR PBB SHADOW E&M-EST. PATIENT-LVL V: CPT | Mod: PBBFAC,,, | Performed by: SURGERY

## 2021-02-22 PROCEDURE — 99024 PR POST-OP FOLLOW-UP VISIT: ICD-10-PCS | Mod: ,,, | Performed by: SURGERY

## 2021-02-22 RX ORDER — SODIUM CHLORIDE 9 MG/ML
INJECTION, SOLUTION INTRAVENOUS CONTINUOUS
Status: CANCELLED | OUTPATIENT
Start: 2021-02-22

## 2021-02-23 ENCOUNTER — HOSPITAL ENCOUNTER (OUTPATIENT)
Dept: WOUND CARE | Facility: HOSPITAL | Age: 61
Discharge: HOME | End: 2021-02-23
Attending: FAMILY MEDICINE
Payer: MEDICAID

## 2021-02-23 VITALS — HEART RATE: 83 BPM | DIASTOLIC BLOOD PRESSURE: 81 MMHG | TEMPERATURE: 98 F | SYSTOLIC BLOOD PRESSURE: 136 MMHG

## 2021-02-23 DIAGNOSIS — L97.516 DIABETIC ULCER OF TOE OF RIGHT FOOT ASSOCIATED WITH TYPE 2 DIABETES MELLITUS, WITH BONE INVOLVEMENT WITHOUT EVIDENCE OF NECROSIS: Primary | ICD-10-CM

## 2021-02-23 DIAGNOSIS — I70.233 ATHEROSCLEROSIS OF NATIVE ARTERY OF RIGHT LOWER EXTREMITY WITH ULCERATION OF ANKLE: ICD-10-CM

## 2021-02-23 DIAGNOSIS — N18.30 CKD STAGE 3 DUE TO TYPE 2 DIABETES MELLITUS: ICD-10-CM

## 2021-02-23 DIAGNOSIS — I70.244: ICD-10-CM

## 2021-02-23 DIAGNOSIS — E11.621 DIABETIC ULCER OF TOE OF RIGHT FOOT ASSOCIATED WITH TYPE 2 DIABETES MELLITUS, WITH BONE INVOLVEMENT WITHOUT EVIDENCE OF NECROSIS: Primary | ICD-10-CM

## 2021-02-23 DIAGNOSIS — L97.919 DIABETIC ULCER OF RIGHT LOWER LEG: ICD-10-CM

## 2021-02-23 DIAGNOSIS — I70.234 ATHEROSCLEROSIS OF NATIVE ARTERY OF RIGHT LOWER EXTREMITY WITH ULCERATION OF HEEL: ICD-10-CM

## 2021-02-23 DIAGNOSIS — I70.239 ATHEROSCLEROSIS OF NATIVE ARTERY OF RIGHT LEG WITH ULCERATION: ICD-10-CM

## 2021-02-23 DIAGNOSIS — L97.519 TYPE 2 DIABETES MELLITUS WITH RIGHT DIABETIC FOOT ULCER: ICD-10-CM

## 2021-02-23 DIAGNOSIS — E11.622 DIABETIC ULCER OF RIGHT LOWER LEG: ICD-10-CM

## 2021-02-23 DIAGNOSIS — E11.621 TYPE 2 DIABETES MELLITUS WITH RIGHT DIABETIC FOOT ULCER: ICD-10-CM

## 2021-02-23 DIAGNOSIS — R20.2 PARESTHESIA: ICD-10-CM

## 2021-02-23 DIAGNOSIS — E11.22 CKD STAGE 3 DUE TO TYPE 2 DIABETES MELLITUS: ICD-10-CM

## 2021-02-23 LAB
BACTERIA TISS AEROBE CULT: NO GROWTH
GRAM STN SPEC: NORMAL
GRAM STN SPEC: NORMAL

## 2021-02-23 PROCEDURE — 99214 PR OFFICE/OUTPT VISIT, EST, LEVL IV, 30-39 MIN: ICD-10-PCS | Mod: ,,, | Performed by: FAMILY MEDICINE

## 2021-02-23 PROCEDURE — 99214 OFFICE O/P EST MOD 30 MIN: CPT | Mod: ,,, | Performed by: FAMILY MEDICINE

## 2021-02-23 PROCEDURE — 99215 OFFICE O/P EST HI 40 MIN: CPT | Performed by: FAMILY MEDICINE

## 2021-02-23 RX ORDER — GABAPENTIN 100 MG/1
CAPSULE ORAL
Qty: 60 CAPSULE | Refills: 1 | Status: SHIPPED | OUTPATIENT
Start: 2021-02-23 | End: 2021-01-01 | Stop reason: SDUPTHER

## 2021-02-23 RX ORDER — COLLAGENASE SANTYL 250 [ARB'U]/G
OINTMENT TOPICAL DAILY
Qty: 90 G | Refills: 3 | Status: SHIPPED | OUTPATIENT
Start: 2021-02-23 | End: 2021-01-01 | Stop reason: SDUPTHER

## 2021-02-24 ENCOUNTER — TELEPHONE (OUTPATIENT)
Dept: WOUND CARE | Facility: HOSPITAL | Age: 61
End: 2021-02-24

## 2021-02-24 LAB — FUNGUS SPEC CULT: ABNORMAL

## 2021-02-25 ENCOUNTER — TELEPHONE (OUTPATIENT)
Dept: RESEARCH | Facility: HOSPITAL | Age: 61
End: 2021-02-25

## 2021-02-26 ENCOUNTER — HOSPITAL ENCOUNTER (OUTPATIENT)
Dept: INTERVENTIONAL RADIOLOGY/VASCULAR | Facility: HOSPITAL | Age: 61
Discharge: HOME | End: 2021-02-26
Attending: FAMILY MEDICINE
Payer: MEDICAID

## 2021-02-26 ENCOUNTER — HOSPITAL ENCOUNTER (OUTPATIENT)
Facility: HOSPITAL | Age: 61
Discharge: HOME | End: 2021-02-26
Attending: RADIOLOGY | Admitting: RADIOLOGY
Payer: MEDICAID

## 2021-02-26 VITALS — SYSTOLIC BLOOD PRESSURE: 120 MMHG | DIASTOLIC BLOOD PRESSURE: 69 MMHG

## 2021-02-26 DIAGNOSIS — R18.8 OTHER ASCITES: ICD-10-CM

## 2021-02-26 PROCEDURE — 49083 IR PARACENTESIS WITH IMAGING: ICD-10-PCS | Mod: ,,, | Performed by: RADIOLOGY

## 2021-02-26 PROCEDURE — 49083 ABD PARACENTESIS W/IMAGING: CPT | Performed by: RADIOLOGY

## 2021-02-26 PROCEDURE — A7048 VACUUM DRAIN BOTTLE/TUBE KIT: HCPCS

## 2021-03-02 ENCOUNTER — HOSPITAL ENCOUNTER (OUTPATIENT)
Dept: PREADMISSION TESTING | Facility: HOSPITAL | Age: 61
Discharge: HOME | End: 2021-03-02
Attending: SURGERY
Payer: MEDICAID

## 2021-03-02 ENCOUNTER — ANESTHESIA EVENT (OUTPATIENT)
Dept: SURGERY | Facility: HOSPITAL | Age: 61
End: 2021-03-02
Payer: MEDICAID

## 2021-03-02 ENCOUNTER — HOSPITAL ENCOUNTER (OUTPATIENT)
Dept: WOUND CARE | Facility: HOSPITAL | Age: 61
Discharge: HOME | End: 2021-03-02
Attending: FAMILY MEDICINE
Payer: MEDICAID

## 2021-03-02 VITALS
WEIGHT: 200 LBS | TEMPERATURE: 100 F | HEIGHT: 70 IN | BODY MASS INDEX: 28.63 KG/M2 | SYSTOLIC BLOOD PRESSURE: 123 MMHG | RESPIRATION RATE: 16 BRPM | HEART RATE: 90 BPM | OXYGEN SATURATION: 98 % | DIASTOLIC BLOOD PRESSURE: 72 MMHG

## 2021-03-02 VITALS — DIASTOLIC BLOOD PRESSURE: 71 MMHG | SYSTOLIC BLOOD PRESSURE: 117 MMHG | TEMPERATURE: 98 F | HEART RATE: 96 BPM

## 2021-03-02 DIAGNOSIS — I83.009 VENOUS STASIS ULCER WITH EDEMA OF LOWER LEG: ICD-10-CM

## 2021-03-02 DIAGNOSIS — E11.621 DIABETIC ULCER OF TOE OF RIGHT FOOT ASSOCIATED WITH TYPE 2 DIABETES MELLITUS, WITH BONE INVOLVEMENT WITHOUT EVIDENCE OF NECROSIS: ICD-10-CM

## 2021-03-02 DIAGNOSIS — Z11.52 ENCOUNTER FOR PREOPERATIVE SCREENING LABORATORY TESTING FOR COVID-19 VIRUS: ICD-10-CM

## 2021-03-02 DIAGNOSIS — L97.519 TYPE 2 DIABETES MELLITUS WITH RIGHT DIABETIC FOOT ULCER: ICD-10-CM

## 2021-03-02 DIAGNOSIS — Z01.812 ENCOUNTER FOR PREOPERATIVE SCREENING LABORATORY TESTING FOR COVID-19 VIRUS: ICD-10-CM

## 2021-03-02 DIAGNOSIS — I83.899 VENOUS STASIS ULCER WITH EDEMA OF LOWER LEG: ICD-10-CM

## 2021-03-02 DIAGNOSIS — L97.509 DIABETES MELLITUS DUE TO UNDERLYING CONDITION WITH FOOT ULCER, WITHOUT LONG-TERM CURRENT USE OF INSULIN: ICD-10-CM

## 2021-03-02 DIAGNOSIS — Z01.818 PREOP TESTING: Primary | ICD-10-CM

## 2021-03-02 DIAGNOSIS — R60.9 VENOUS STASIS ULCER WITH EDEMA OF LOWER LEG: ICD-10-CM

## 2021-03-02 DIAGNOSIS — E11.621 TYPE 2 DIABETES MELLITUS WITH RIGHT DIABETIC FOOT ULCER: ICD-10-CM

## 2021-03-02 DIAGNOSIS — I73.9 PVD (PERIPHERAL VASCULAR DISEASE): ICD-10-CM

## 2021-03-02 DIAGNOSIS — E11.22 CKD STAGE 3 DUE TO TYPE 2 DIABETES MELLITUS: ICD-10-CM

## 2021-03-02 DIAGNOSIS — L97.909 VENOUS STASIS ULCER WITH EDEMA OF LOWER LEG: ICD-10-CM

## 2021-03-02 DIAGNOSIS — L97.516 DIABETIC ULCER OF TOE OF RIGHT FOOT ASSOCIATED WITH TYPE 2 DIABETES MELLITUS, WITH BONE INVOLVEMENT WITHOUT EVIDENCE OF NECROSIS: ICD-10-CM

## 2021-03-02 DIAGNOSIS — I70.239 ATHEROSCLEROSIS OF NATIVE ARTERY OF RIGHT LEG WITH ULCERATION: Primary | ICD-10-CM

## 2021-03-02 DIAGNOSIS — N18.30 CKD STAGE 3 DUE TO TYPE 2 DIABETES MELLITUS: ICD-10-CM

## 2021-03-02 DIAGNOSIS — E08.621 DIABETES MELLITUS DUE TO UNDERLYING CONDITION WITH FOOT ULCER, WITHOUT LONG-TERM CURRENT USE OF INSULIN: ICD-10-CM

## 2021-03-02 LAB
ANION GAP SERPL CALC-SCNC: 10 MMOL/L (ref 8–16)
BASOPHILS # BLD AUTO: 0.06 K/UL (ref 0–0.2)
BASOPHILS NFR BLD: 0.6 % (ref 0–1.9)
BUN SERPL-MCNC: 45 MG/DL (ref 8–23)
CALCIUM SERPL-MCNC: 8.1 MG/DL (ref 8.7–10.5)
CHLORIDE SERPL-SCNC: 106 MMOL/L (ref 95–110)
CO2 SERPL-SCNC: 25 MMOL/L (ref 23–29)
CREAT SERPL-MCNC: 1.3 MG/DL (ref 0.5–1.4)
DIFFERENTIAL METHOD: ABNORMAL
EOSINOPHIL # BLD AUTO: 0.2 K/UL (ref 0–0.5)
EOSINOPHIL NFR BLD: 2.4 % (ref 0–8)
ERYTHROCYTE [DISTWIDTH] IN BLOOD BY AUTOMATED COUNT: 19.5 % (ref 11.5–14.5)
EST. GFR  (AFRICAN AMERICAN): >60 ML/MIN/1.73 M^2
EST. GFR  (NON AFRICAN AMERICAN): 58 ML/MIN/1.73 M^2
GLUCOSE SERPL-MCNC: 136 MG/DL (ref 70–110)
HCT VFR BLD AUTO: 37.7 % (ref 40–54)
HGB BLD-MCNC: 11.6 G/DL (ref 14–18)
IMM GRANULOCYTES # BLD AUTO: 0.04 K/UL (ref 0–0.04)
IMM GRANULOCYTES NFR BLD AUTO: 0.4 % (ref 0–0.5)
LYMPHOCYTES # BLD AUTO: 0.4 K/UL (ref 1–4.8)
LYMPHOCYTES NFR BLD: 4.4 % (ref 18–48)
MCH RBC QN AUTO: 27.3 PG (ref 27–31)
MCHC RBC AUTO-ENTMCNC: 30.8 G/DL (ref 32–36)
MCV RBC AUTO: 89 FL (ref 82–98)
MONOCYTES # BLD AUTO: 0.8 K/UL (ref 0.3–1)
MONOCYTES NFR BLD: 8.4 % (ref 4–15)
NEUTROPHILS # BLD AUTO: 8.3 K/UL (ref 1.8–7.7)
NEUTROPHILS NFR BLD: 83.8 % (ref 38–73)
NRBC BLD-RTO: 0 /100 WBC
PLATELET # BLD AUTO: 334 K/UL (ref 150–350)
PMV BLD AUTO: 9.4 FL (ref 9.2–12.9)
POTASSIUM SERPL-SCNC: 4.5 MMOL/L (ref 3.5–5.1)
RBC # BLD AUTO: 4.25 M/UL (ref 4.6–6.2)
SARS-COV-2 RDRP RESP QL NAA+PROBE: NEGATIVE
SODIUM SERPL-SCNC: 141 MMOL/L (ref 136–145)
WBC # BLD AUTO: 9.91 K/UL (ref 3.9–12.7)

## 2021-03-02 PROCEDURE — 99214 OFFICE O/P EST MOD 30 MIN: CPT | Mod: ,,, | Performed by: FAMILY MEDICINE

## 2021-03-02 PROCEDURE — 99214 PR OFFICE/OUTPT VISIT, EST, LEVL IV, 30-39 MIN: ICD-10-PCS | Mod: ,,, | Performed by: FAMILY MEDICINE

## 2021-03-02 PROCEDURE — 36415 COLL VENOUS BLD VENIPUNCTURE: CPT

## 2021-03-02 PROCEDURE — 85025 COMPLETE CBC W/AUTO DIFF WBC: CPT

## 2021-03-02 PROCEDURE — U0002 COVID-19 LAB TEST NON-CDC: HCPCS

## 2021-03-02 PROCEDURE — 80048 BASIC METABOLIC PNL TOTAL CA: CPT

## 2021-03-04 ENCOUNTER — HOSPITAL ENCOUNTER (OUTPATIENT)
Facility: HOSPITAL | Age: 61
Discharge: HOME | End: 2021-03-04
Attending: RADIOLOGY | Admitting: RADIOLOGY
Payer: MEDICAID

## 2021-03-04 ENCOUNTER — HOSPITAL ENCOUNTER (OUTPATIENT)
Dept: INTERVENTIONAL RADIOLOGY/VASCULAR | Facility: HOSPITAL | Age: 61
Discharge: HOME | End: 2021-03-04
Attending: FAMILY MEDICINE
Payer: MEDICAID

## 2021-03-04 VITALS
TEMPERATURE: 98 F | HEART RATE: 89 BPM | SYSTOLIC BLOOD PRESSURE: 113 MMHG | RESPIRATION RATE: 18 BRPM | DIASTOLIC BLOOD PRESSURE: 70 MMHG | OXYGEN SATURATION: 98 %

## 2021-03-04 VITALS — SYSTOLIC BLOOD PRESSURE: 121 MMHG | DIASTOLIC BLOOD PRESSURE: 72 MMHG

## 2021-03-04 DIAGNOSIS — R18.8 OTHER ASCITES: ICD-10-CM

## 2021-03-04 PROCEDURE — 63600175 PHARM REV CODE 636 W HCPCS: Mod: JG | Performed by: RADIOLOGY

## 2021-03-04 PROCEDURE — A7048 VACUUM DRAIN BOTTLE/TUBE KIT: HCPCS

## 2021-03-04 PROCEDURE — 49083 ABD PARACENTESIS W/IMAGING: CPT | Performed by: RADIOLOGY

## 2021-03-04 PROCEDURE — P9047 ALBUMIN (HUMAN), 25%, 50ML: HCPCS | Mod: JG | Performed by: RADIOLOGY

## 2021-03-04 PROCEDURE — 49083 IR PARACENTESIS WITH IMAGING: ICD-10-PCS | Mod: ,,, | Performed by: RADIOLOGY

## 2021-03-04 RX ORDER — ALBUMIN HUMAN 250 G/1000ML
50 SOLUTION INTRAVENOUS ONCE AS NEEDED
Status: CANCELLED | OUTPATIENT
Start: 2021-03-04 | End: 2032-07-31

## 2021-03-04 RX ORDER — HEPARIN SODIUM 1000 [USP'U]/ML
1000 INJECTION INTRAVENOUS; SUBCUTANEOUS ONCE
Status: CANCELLED | OUTPATIENT
Start: 2021-03-04 | End: 2021-03-04

## 2021-03-04 RX ORDER — HEPARIN 100 UNIT/ML
500 SYRINGE INTRAVENOUS ONCE AS NEEDED
Status: COMPLETED | OUTPATIENT
Start: 2021-03-04 | End: 2021-03-04

## 2021-03-04 RX ORDER — ALBUMIN HUMAN 250 G/1000ML
50 SOLUTION INTRAVENOUS ONCE AS NEEDED
Status: COMPLETED | OUTPATIENT
Start: 2021-03-04 | End: 2021-03-04

## 2021-03-04 RX ADMIN — ALBUMIN (HUMAN) 50 G: 0.25 INJECTION, SOLUTION INTRAVENOUS at 10:03

## 2021-03-04 RX ADMIN — HEPARIN 500 UNITS: 100 SYRINGE at 11:03

## 2021-03-05 ENCOUNTER — ANESTHESIA (OUTPATIENT)
Dept: SURGERY | Facility: HOSPITAL | Age: 61
End: 2021-03-05
Payer: MEDICAID

## 2021-03-05 ENCOUNTER — HOSPITAL ENCOUNTER (OUTPATIENT)
Facility: HOSPITAL | Age: 61
Discharge: HOME | End: 2021-03-05
Attending: SURGERY | Admitting: SURGERY
Payer: MEDICAID

## 2021-03-05 VITALS
WEIGHT: 199.94 LBS | BODY MASS INDEX: 28.69 KG/M2 | HEART RATE: 74 BPM | SYSTOLIC BLOOD PRESSURE: 115 MMHG | TEMPERATURE: 98 F | RESPIRATION RATE: 16 BRPM | OXYGEN SATURATION: 97 % | DIASTOLIC BLOOD PRESSURE: 64 MMHG

## 2021-03-05 DIAGNOSIS — S91.301A OPEN WOUND OF RIGHT FOOT: ICD-10-CM

## 2021-03-05 DIAGNOSIS — Z01.812 ENCOUNTER FOR PREOPERATIVE SCREENING LABORATORY TESTING FOR COVID-19 VIRUS: Primary | ICD-10-CM

## 2021-03-05 DIAGNOSIS — E08.621 DIABETES MELLITUS DUE TO UNDERLYING CONDITION WITH FOOT ULCER, WITHOUT LONG-TERM CURRENT USE OF INSULIN: ICD-10-CM

## 2021-03-05 DIAGNOSIS — I73.9 PVD (PERIPHERAL VASCULAR DISEASE): ICD-10-CM

## 2021-03-05 DIAGNOSIS — S91.301D OPEN WOUND OF RIGHT HEEL, SUBSEQUENT ENCOUNTER: ICD-10-CM

## 2021-03-05 DIAGNOSIS — Z11.52 ENCOUNTER FOR PREOPERATIVE SCREENING LABORATORY TESTING FOR COVID-19 VIRUS: Primary | ICD-10-CM

## 2021-03-05 DIAGNOSIS — L97.509 DIABETES MELLITUS DUE TO UNDERLYING CONDITION WITH FOOT ULCER, WITHOUT LONG-TERM CURRENT USE OF INSULIN: ICD-10-CM

## 2021-03-05 LAB — POCT GLUCOSE: 120 MG/DL (ref 70–110)

## 2021-03-05 PROCEDURE — 25000003 PHARM REV CODE 250: Performed by: SURGERY

## 2021-03-05 PROCEDURE — 63600175 PHARM REV CODE 636 W HCPCS: Performed by: REGISTERED NURSE

## 2021-03-05 PROCEDURE — 11044 PR DEBRIDEMENT, SKIN, SUB-Q TISSUE,MUSCLE,BONE,=<20 SQ CM: ICD-10-PCS | Mod: 51,,, | Performed by: SURGERY

## 2021-03-05 PROCEDURE — 88311 DECALCIFY TISSUE: CPT | Performed by: PATHOLOGY

## 2021-03-05 PROCEDURE — D9220A PRA ANESTHESIA: Mod: CRNA,,, | Performed by: REGISTERED NURSE

## 2021-03-05 PROCEDURE — 88305 TISSUE EXAM BY PATHOLOGIST: ICD-10-PCS | Mod: 26,,, | Performed by: PATHOLOGY

## 2021-03-05 PROCEDURE — 71000016 HC POSTOP RECOV ADDL HR: Performed by: SURGERY

## 2021-03-05 PROCEDURE — 88305 TISSUE EXAM BY PATHOLOGIST: CPT | Performed by: PATHOLOGY

## 2021-03-05 PROCEDURE — 37000009 HC ANESTHESIA EA ADD 15 MINS: Performed by: SURGERY

## 2021-03-05 PROCEDURE — 11044 DBRDMT BONE 1ST 20 SQ CM/<: CPT | Mod: 51,,, | Performed by: SURGERY

## 2021-03-05 PROCEDURE — D9220A PRA ANESTHESIA: Mod: ANES,,, | Performed by: ANESTHESIOLOGY

## 2021-03-05 PROCEDURE — 01480 ANES OPEN PX LOWER L/A/F NOS: CPT | Performed by: SURGERY

## 2021-03-05 PROCEDURE — 25000003 PHARM REV CODE 250: Performed by: REGISTERED NURSE

## 2021-03-05 PROCEDURE — 88311 DECALCIFY TISSUE: CPT | Mod: 26,,, | Performed by: PATHOLOGY

## 2021-03-05 PROCEDURE — 82962 GLUCOSE BLOOD TEST: CPT | Performed by: SURGERY

## 2021-03-05 PROCEDURE — 71000033 HC RECOVERY, INTIAL HOUR: Performed by: SURGERY

## 2021-03-05 PROCEDURE — D9220A PRA ANESTHESIA: ICD-10-PCS | Mod: ANES,,, | Performed by: ANESTHESIOLOGY

## 2021-03-05 PROCEDURE — 63600175 PHARM REV CODE 636 W HCPCS: Performed by: ANESTHESIOLOGY

## 2021-03-05 PROCEDURE — 37000008 HC ANESTHESIA 1ST 15 MINUTES: Performed by: SURGERY

## 2021-03-05 PROCEDURE — D9220A PRA ANESTHESIA: ICD-10-PCS | Mod: CRNA,,, | Performed by: REGISTERED NURSE

## 2021-03-05 PROCEDURE — 28810 AMPUTATION TOE & METATARSAL: CPT | Mod: T7,,, | Performed by: SURGERY

## 2021-03-05 PROCEDURE — 88305 TISSUE EXAM BY PATHOLOGIST: CPT | Mod: 26,,, | Performed by: PATHOLOGY

## 2021-03-05 PROCEDURE — 36000706: Performed by: SURGERY

## 2021-03-05 PROCEDURE — 36000707: Performed by: SURGERY

## 2021-03-05 PROCEDURE — 28810 PR AMPUTATION METATARSAL+TOE,SINGLE: ICD-10-PCS | Mod: T7,,, | Performed by: SURGERY

## 2021-03-05 PROCEDURE — 71000015 HC POSTOP RECOV 1ST HR: Performed by: SURGERY

## 2021-03-05 PROCEDURE — 25000003 PHARM REV CODE 250: Performed by: ANESTHESIOLOGY

## 2021-03-05 PROCEDURE — 88311 PR  DECALCIFY TISSUE: ICD-10-PCS | Mod: 26,,, | Performed by: PATHOLOGY

## 2021-03-05 RX ORDER — OXYCODONE AND ACETAMINOPHEN 5; 325 MG/1; MG/1
1 TABLET ORAL EVERY 6 HOURS PRN
Qty: 20 TABLET | Refills: 0 | Status: SHIPPED | OUTPATIENT
Start: 2021-03-05 | End: 2021-03-19

## 2021-03-05 RX ORDER — SODIUM CHLORIDE, SODIUM LACTATE, POTASSIUM CHLORIDE, CALCIUM CHLORIDE 600; 310; 30; 20 MG/100ML; MG/100ML; MG/100ML; MG/100ML
INJECTION, SOLUTION INTRAVENOUS CONTINUOUS
Status: DISCONTINUED | OUTPATIENT
Start: 2021-03-05 | End: 2021-03-05 | Stop reason: HOSPADM

## 2021-03-05 RX ORDER — KETAMINE HYDROCHLORIDE 100 MG/ML
INJECTION, SOLUTION INTRAMUSCULAR; INTRAVENOUS
Status: DISCONTINUED | OUTPATIENT
Start: 2021-03-05 | End: 2021-03-05

## 2021-03-05 RX ORDER — SODIUM CHLORIDE 9 MG/ML
INJECTION, SOLUTION INTRAVENOUS CONTINUOUS
Status: DISCONTINUED | OUTPATIENT
Start: 2021-03-05 | End: 2021-03-05 | Stop reason: HOSPADM

## 2021-03-05 RX ORDER — ACETAMINOPHEN 500 MG
1000 TABLET ORAL
Status: COMPLETED | OUTPATIENT
Start: 2021-03-05 | End: 2021-03-05

## 2021-03-05 RX ORDER — HYDROMORPHONE HYDROCHLORIDE 2 MG/ML
0.2 INJECTION, SOLUTION INTRAMUSCULAR; INTRAVENOUS; SUBCUTANEOUS EVERY 5 MIN PRN
Status: DISCONTINUED | OUTPATIENT
Start: 2021-03-05 | End: 2021-03-05 | Stop reason: HOSPADM

## 2021-03-05 RX ORDER — CLINDAMYCIN PHOSPHATE 900 MG/50ML
900 INJECTION, SOLUTION INTRAVENOUS ONCE
Status: COMPLETED | OUTPATIENT
Start: 2021-03-05 | End: 2021-03-05

## 2021-03-05 RX ORDER — MIDAZOLAM HYDROCHLORIDE 1 MG/ML
INJECTION, SOLUTION INTRAMUSCULAR; INTRAVENOUS
Status: DISCONTINUED | OUTPATIENT
Start: 2021-03-05 | End: 2021-03-05

## 2021-03-05 RX ORDER — PROPOFOL 10 MG/ML
VIAL (ML) INTRAVENOUS CONTINUOUS PRN
Status: DISCONTINUED | OUTPATIENT
Start: 2021-03-05 | End: 2021-03-05

## 2021-03-05 RX ORDER — FENTANYL CITRATE 50 UG/ML
25 INJECTION, SOLUTION INTRAMUSCULAR; INTRAVENOUS EVERY 5 MIN PRN
Status: DISCONTINUED | OUTPATIENT
Start: 2021-03-05 | End: 2021-03-05 | Stop reason: HOSPADM

## 2021-03-05 RX ORDER — PROPOFOL 10 MG/ML
VIAL (ML) INTRAVENOUS
Status: DISCONTINUED | OUTPATIENT
Start: 2021-03-05 | End: 2021-03-05

## 2021-03-05 RX ORDER — SODIUM CHLORIDE 0.9 % (FLUSH) 0.9 %
3 SYRINGE (ML) INJECTION
Status: DISCONTINUED | OUTPATIENT
Start: 2021-03-05 | End: 2021-03-05 | Stop reason: HOSPADM

## 2021-03-05 RX ORDER — CLINDAMYCIN PHOSPHATE 900 MG/50ML
900 INJECTION, SOLUTION INTRAVENOUS
Status: DISCONTINUED | OUTPATIENT
Start: 2021-03-05 | End: 2021-03-05

## 2021-03-05 RX ADMIN — ACETAMINOPHEN 1000 MG: 500 TABLET ORAL at 08:03

## 2021-03-05 RX ADMIN — KETAMINE HYDROCHLORIDE 10 MG: 100 INJECTION, SOLUTION, CONCENTRATE INTRAMUSCULAR; INTRAVENOUS at 09:03

## 2021-03-05 RX ADMIN — PROPOFOL 20 MCG/KG/MIN: 10 INJECTION, EMULSION INTRAVENOUS at 09:03

## 2021-03-05 RX ADMIN — MIDAZOLAM HYDROCHLORIDE 2 MG: 1 INJECTION, SOLUTION INTRAMUSCULAR; INTRAVENOUS at 08:03

## 2021-03-05 RX ADMIN — PROPOFOL 20 MG: 10 INJECTION, EMULSION INTRAVENOUS at 09:03

## 2021-03-05 RX ADMIN — SODIUM CHLORIDE, SODIUM LACTATE, POTASSIUM CHLORIDE, AND CALCIUM CHLORIDE: .6; .31; .03; .02 INJECTION, SOLUTION INTRAVENOUS at 07:03

## 2021-03-05 RX ADMIN — CLINDAMYCIN PHOSPHATE 900 MG: 18 INJECTION, SOLUTION INTRAVENOUS at 08:03

## 2021-03-05 RX ADMIN — KETAMINE HYDROCHLORIDE 10 MG: 100 INJECTION, SOLUTION, CONCENTRATE INTRAMUSCULAR; INTRAVENOUS at 10:03

## 2021-03-08 ENCOUNTER — TELEPHONE (OUTPATIENT)
Dept: VASCULAR SURGERY | Facility: CLINIC | Age: 61
End: 2021-03-08

## 2021-03-09 LAB
ACID FAST MOD KINY STN SPEC: NORMAL
FINAL PATHOLOGIC DIAGNOSIS: NORMAL
GROSS: NORMAL
Lab: NORMAL
MYCOBACTERIUM SPEC QL CULT: NORMAL

## 2021-03-10 ENCOUNTER — TELEPHONE (OUTPATIENT)
Dept: INFECTIOUS DISEASES | Facility: CLINIC | Age: 61
End: 2021-03-10

## 2021-03-10 ENCOUNTER — OFFICE VISIT (OUTPATIENT)
Dept: ENDOCRINOLOGY | Facility: CLINIC | Age: 61
End: 2021-03-10
Payer: MEDICAID

## 2021-03-10 VITALS — DIASTOLIC BLOOD PRESSURE: 65 MMHG | TEMPERATURE: 98 F | SYSTOLIC BLOOD PRESSURE: 111 MMHG | HEART RATE: 82 BPM

## 2021-03-10 DIAGNOSIS — I73.9 PVD (PERIPHERAL VASCULAR DISEASE): ICD-10-CM

## 2021-03-10 DIAGNOSIS — E78.5 HYPERLIPIDEMIA, UNSPECIFIED HYPERLIPIDEMIA TYPE: ICD-10-CM

## 2021-03-10 DIAGNOSIS — I10 ESSENTIAL HYPERTENSION: ICD-10-CM

## 2021-03-10 DIAGNOSIS — E11.42 DM TYPE 2 WITH DIABETIC PERIPHERAL NEUROPATHY: Primary | ICD-10-CM

## 2021-03-10 PROCEDURE — 99999 PR PBB SHADOW E&M-EST. PATIENT-LVL IV: CPT | Mod: PBBFAC,,, | Performed by: NURSE PRACTITIONER

## 2021-03-10 PROCEDURE — 99214 OFFICE O/P EST MOD 30 MIN: CPT | Mod: S$PBB,,, | Performed by: NURSE PRACTITIONER

## 2021-03-10 PROCEDURE — 99999 PR PBB SHADOW E&M-EST. PATIENT-LVL IV: ICD-10-PCS | Mod: PBBFAC,,, | Performed by: NURSE PRACTITIONER

## 2021-03-10 PROCEDURE — 99214 PR OFFICE/OUTPT VISIT, EST, LEVL IV, 30-39 MIN: ICD-10-PCS | Mod: S$PBB,,, | Performed by: NURSE PRACTITIONER

## 2021-03-10 PROCEDURE — 99214 OFFICE O/P EST MOD 30 MIN: CPT | Mod: PBBFAC,PN | Performed by: NURSE PRACTITIONER

## 2021-03-10 RX ORDER — (INSULIN DEGLUDEC AND LIRAGLUTIDE) 100; 3.6 [IU]/ML; MG/ML
35 INJECTION, SOLUTION SUBCUTANEOUS DAILY
Qty: 15 ML | Refills: 5 | Status: SHIPPED | OUTPATIENT
Start: 2021-03-10 | End: 2022-01-01 | Stop reason: SDUPTHER

## 2021-03-11 ENCOUNTER — OFFICE VISIT (OUTPATIENT)
Dept: VASCULAR SURGERY | Facility: CLINIC | Age: 61
End: 2021-03-11
Payer: MEDICAID

## 2021-03-11 VITALS
BODY MASS INDEX: 28.62 KG/M2 | DIASTOLIC BLOOD PRESSURE: 60 MMHG | WEIGHT: 199.94 LBS | HEIGHT: 70 IN | SYSTOLIC BLOOD PRESSURE: 90 MMHG

## 2021-03-11 DIAGNOSIS — I70.234 ATHEROSCLEROSIS OF NATIVE ARTERY OF RIGHT LOWER EXTREMITY WITH ULCERATION OF HEEL: Primary | ICD-10-CM

## 2021-03-11 PROCEDURE — 99999 PR PBB SHADOW E&M-EST. PATIENT-LVL IV: CPT | Mod: PBBFAC,,, | Performed by: SURGERY

## 2021-03-11 PROCEDURE — 99024 PR POST-OP FOLLOW-UP VISIT: ICD-10-PCS | Mod: ,,, | Performed by: SURGERY

## 2021-03-11 PROCEDURE — 99024 POSTOP FOLLOW-UP VISIT: CPT | Mod: ,,, | Performed by: SURGERY

## 2021-03-11 PROCEDURE — 99214 OFFICE O/P EST MOD 30 MIN: CPT | Mod: PBBFAC | Performed by: SURGERY

## 2021-03-11 PROCEDURE — 99999 PR PBB SHADOW E&M-EST. PATIENT-LVL IV: ICD-10-PCS | Mod: PBBFAC,,, | Performed by: SURGERY

## 2021-03-12 ENCOUNTER — HOSPITAL ENCOUNTER (OUTPATIENT)
Dept: INTERVENTIONAL RADIOLOGY/VASCULAR | Facility: HOSPITAL | Age: 61
Discharge: HOME | End: 2021-03-12
Attending: FAMILY MEDICINE
Payer: MEDICAID

## 2021-03-12 ENCOUNTER — HOSPITAL ENCOUNTER (OUTPATIENT)
Facility: HOSPITAL | Age: 61
Discharge: HOME | End: 2021-03-12
Attending: RADIOLOGY | Admitting: RADIOLOGY
Payer: MEDICAID

## 2021-03-12 VITALS
HEART RATE: 80 BPM | DIASTOLIC BLOOD PRESSURE: 65 MMHG | TEMPERATURE: 98 F | RESPIRATION RATE: 17 BRPM | OXYGEN SATURATION: 96 % | SYSTOLIC BLOOD PRESSURE: 115 MMHG

## 2021-03-12 VITALS — SYSTOLIC BLOOD PRESSURE: 119 MMHG | DIASTOLIC BLOOD PRESSURE: 64 MMHG

## 2021-03-12 DIAGNOSIS — R18.8 OTHER ASCITES: ICD-10-CM

## 2021-03-12 PROCEDURE — 63600175 PHARM REV CODE 636 W HCPCS: Mod: JG | Performed by: RADIOLOGY

## 2021-03-12 PROCEDURE — 49083 IR PARACENTESIS WITH IMAGING: ICD-10-PCS | Mod: ,,, | Performed by: RADIOLOGY

## 2021-03-12 PROCEDURE — 49083 ABD PARACENTESIS W/IMAGING: CPT | Performed by: RADIOLOGY

## 2021-03-12 PROCEDURE — A7048 VACUUM DRAIN BOTTLE/TUBE KIT: HCPCS

## 2021-03-12 PROCEDURE — P9047 ALBUMIN (HUMAN), 25%, 50ML: HCPCS | Mod: JG | Performed by: RADIOLOGY

## 2021-03-12 RX ORDER — ALBUMIN HUMAN 250 G/1000ML
50 SOLUTION INTRAVENOUS ONCE AS NEEDED
Status: COMPLETED | OUTPATIENT
Start: 2021-03-12 | End: 2021-03-12

## 2021-03-12 RX ORDER — ALBUMIN HUMAN 250 G/1000ML
50 SOLUTION INTRAVENOUS ONCE AS NEEDED
Status: CANCELLED | OUTPATIENT
Start: 2021-03-12 | End: 2032-08-08

## 2021-03-12 RX ADMIN — ALBUMIN (HUMAN) 50 G: 0.25 INJECTION, SOLUTION INTRAVENOUS at 11:03

## 2021-03-15 ENCOUNTER — OFFICE VISIT (OUTPATIENT)
Dept: CARDIOLOGY | Facility: CLINIC | Age: 61
End: 2021-03-15
Payer: MEDICAID

## 2021-03-15 VITALS
OXYGEN SATURATION: 96 % | SYSTOLIC BLOOD PRESSURE: 124 MMHG | HEART RATE: 72 BPM | DIASTOLIC BLOOD PRESSURE: 58 MMHG | HEIGHT: 70 IN | RESPIRATION RATE: 15 BRPM | BODY MASS INDEX: 28.69 KG/M2

## 2021-03-15 DIAGNOSIS — N18.31 STAGE 3A CHRONIC KIDNEY DISEASE: ICD-10-CM

## 2021-03-15 DIAGNOSIS — N18.30 CKD STAGE 3 DUE TO TYPE 2 DIABETES MELLITUS: ICD-10-CM

## 2021-03-15 DIAGNOSIS — I73.9 PVD (PERIPHERAL VASCULAR DISEASE): Chronic | ICD-10-CM

## 2021-03-15 DIAGNOSIS — E11.22 CKD STAGE 3 DUE TO TYPE 2 DIABETES MELLITUS: ICD-10-CM

## 2021-03-15 DIAGNOSIS — I25.5 ISCHEMIC CARDIOMYOPATHY: Primary | ICD-10-CM

## 2021-03-15 DIAGNOSIS — L97.529 TYPE 2 DIABETES MELLITUS WITH LEFT DIABETIC FOOT ULCER: ICD-10-CM

## 2021-03-15 DIAGNOSIS — E78.2 MIXED HYPERLIPIDEMIA: ICD-10-CM

## 2021-03-15 DIAGNOSIS — E11.621 TYPE 2 DIABETES MELLITUS WITH LEFT DIABETIC FOOT ULCER: ICD-10-CM

## 2021-03-15 DIAGNOSIS — E11.621 TYPE 2 DIABETES MELLITUS WITH RIGHT DIABETIC FOOT ULCER: ICD-10-CM

## 2021-03-15 DIAGNOSIS — I10 ESSENTIAL HYPERTENSION: Chronic | ICD-10-CM

## 2021-03-15 DIAGNOSIS — Z95.810 ICD (IMPLANTABLE CARDIOVERTER-DEFIBRILLATOR), SINGLE, IN SITU: ICD-10-CM

## 2021-03-15 DIAGNOSIS — L97.519 TYPE 2 DIABETES MELLITUS WITH RIGHT DIABETIC FOOT ULCER: ICD-10-CM

## 2021-03-15 DIAGNOSIS — I70.0 AORTIC ATHEROSCLEROSIS: ICD-10-CM

## 2021-03-15 DIAGNOSIS — I25.10 CORONARY ARTERY DISEASE INVOLVING NATIVE CORONARY ARTERY OF NATIVE HEART WITHOUT ANGINA PECTORIS: ICD-10-CM

## 2021-03-15 DIAGNOSIS — Z78.9 STATIN INTOLERANCE: ICD-10-CM

## 2021-03-15 PROCEDURE — 99214 PR OFFICE/OUTPT VISIT, EST, LEVL IV, 30-39 MIN: ICD-10-PCS | Mod: S$PBB,,, | Performed by: INTERNAL MEDICINE

## 2021-03-15 PROCEDURE — 99214 OFFICE O/P EST MOD 30 MIN: CPT | Mod: S$PBB,,, | Performed by: INTERNAL MEDICINE

## 2021-03-15 PROCEDURE — 99999 PR PBB SHADOW E&M-EST. PATIENT-LVL IV: CPT | Mod: PBBFAC,,, | Performed by: INTERNAL MEDICINE

## 2021-03-15 PROCEDURE — 93282 PRGRMG EVAL IMPLANTABLE DFB: CPT | Mod: PBBFAC | Performed by: INTERNAL MEDICINE

## 2021-03-15 PROCEDURE — 99214 OFFICE O/P EST MOD 30 MIN: CPT | Mod: PBBFAC,25 | Performed by: INTERNAL MEDICINE

## 2021-03-15 PROCEDURE — 93282 PRGRMG EVAL IMPLANTABLE DFB: CPT | Mod: 26,S$PBB,, | Performed by: INTERNAL MEDICINE

## 2021-03-15 PROCEDURE — 93282 PR PROGRAM EVAL (IN PERSON) IMPLANT DEVICE,CARDVERT/DEFIB,1 LEAD: ICD-10-PCS | Mod: 26,S$PBB,, | Performed by: INTERNAL MEDICINE

## 2021-03-15 PROCEDURE — 99999 PR PBB SHADOW E&M-EST. PATIENT-LVL IV: ICD-10-PCS | Mod: PBBFAC,,, | Performed by: INTERNAL MEDICINE

## 2021-03-16 ENCOUNTER — HOSPITAL ENCOUNTER (OUTPATIENT)
Dept: WOUND CARE | Facility: HOSPITAL | Age: 61
Discharge: HOME | End: 2021-03-16
Attending: FAMILY MEDICINE
Payer: MEDICAID

## 2021-03-16 ENCOUNTER — IMMUNIZATION (OUTPATIENT)
Dept: OBSTETRICS AND GYNECOLOGY | Facility: CLINIC | Age: 61
End: 2021-03-16
Payer: MEDICAID

## 2021-03-16 VITALS — HEART RATE: 94 BPM | TEMPERATURE: 97 F | SYSTOLIC BLOOD PRESSURE: 122 MMHG | DIASTOLIC BLOOD PRESSURE: 58 MMHG

## 2021-03-16 DIAGNOSIS — L97.509 DIABETES MELLITUS DUE TO UNDERLYING CONDITION WITH FOOT ULCER, WITHOUT LONG-TERM CURRENT USE OF INSULIN: ICD-10-CM

## 2021-03-16 DIAGNOSIS — L97.909 VENOUS STASIS ULCER WITH EDEMA OF LOWER LEG: Primary | ICD-10-CM

## 2021-03-16 DIAGNOSIS — E08.621 DIABETES MELLITUS DUE TO UNDERLYING CONDITION WITH FOOT ULCER, WITHOUT LONG-TERM CURRENT USE OF INSULIN: ICD-10-CM

## 2021-03-16 DIAGNOSIS — I83.899 VENOUS STASIS ULCER WITH EDEMA OF LOWER LEG: Primary | ICD-10-CM

## 2021-03-16 DIAGNOSIS — I83.009 VENOUS STASIS ULCER WITH EDEMA OF LOWER LEG: Primary | ICD-10-CM

## 2021-03-16 DIAGNOSIS — I70.239 ATHEROSCLEROSIS OF NATIVE ARTERY OF RIGHT LEG WITH ULCERATION: ICD-10-CM

## 2021-03-16 DIAGNOSIS — S91.301A OPEN WOUND OF RIGHT FOOT: ICD-10-CM

## 2021-03-16 DIAGNOSIS — E11.621 TYPE 2 DIABETES MELLITUS WITH RIGHT DIABETIC FOOT ULCER: ICD-10-CM

## 2021-03-16 DIAGNOSIS — L97.519 TYPE 2 DIABETES MELLITUS WITH RIGHT DIABETIC FOOT ULCER: ICD-10-CM

## 2021-03-16 DIAGNOSIS — E11.621 TYPE 2 DIABETES MELLITUS WITH LEFT DIABETIC FOOT ULCER: ICD-10-CM

## 2021-03-16 DIAGNOSIS — I70.233 ATHEROSCLEROSIS OF NATIVE ARTERY OF RIGHT LOWER EXTREMITY WITH ULCERATION OF ANKLE: ICD-10-CM

## 2021-03-16 DIAGNOSIS — Z23 NEED FOR VACCINATION: Primary | ICD-10-CM

## 2021-03-16 DIAGNOSIS — I70.244: ICD-10-CM

## 2021-03-16 DIAGNOSIS — L97.529 TYPE 2 DIABETES MELLITUS WITH LEFT DIABETIC FOOT ULCER: ICD-10-CM

## 2021-03-16 DIAGNOSIS — R60.9 VENOUS STASIS ULCER WITH EDEMA OF LOWER LEG: Primary | ICD-10-CM

## 2021-03-16 PROCEDURE — 91300 COVID-19, MRNA, LNP-S, PF, 30 MCG/0.3 ML DOSE VACCINE: CPT | Mod: PBBFAC

## 2021-03-16 PROCEDURE — 99214 PR OFFICE/OUTPT VISIT, EST, LEVL IV, 30-39 MIN: ICD-10-PCS | Mod: ,,, | Performed by: FAMILY MEDICINE

## 2021-03-16 PROCEDURE — 99214 OFFICE O/P EST MOD 30 MIN: CPT | Mod: ,,, | Performed by: FAMILY MEDICINE

## 2021-03-16 PROCEDURE — 99215 OFFICE O/P EST HI 40 MIN: CPT | Mod: 25 | Performed by: FAMILY MEDICINE

## 2021-03-17 ENCOUNTER — TELEPHONE (OUTPATIENT)
Dept: PHARMACY | Facility: CLINIC | Age: 61
End: 2021-03-17

## 2021-03-19 ENCOUNTER — INFUSION (OUTPATIENT)
Dept: INFECTIOUS DISEASES | Facility: HOSPITAL | Age: 61
End: 2021-03-19
Attending: INTERNAL MEDICINE
Payer: MEDICAID

## 2021-03-19 ENCOUNTER — OFFICE VISIT (OUTPATIENT)
Dept: INFECTIOUS DISEASES | Facility: CLINIC | Age: 61
End: 2021-03-19
Payer: MEDICAID

## 2021-03-19 VITALS
TEMPERATURE: 98 F | WEIGHT: 196 LBS | HEART RATE: 81 BPM | SYSTOLIC BLOOD PRESSURE: 113 MMHG | BODY MASS INDEX: 28.06 KG/M2 | DIASTOLIC BLOOD PRESSURE: 65 MMHG | HEIGHT: 70 IN

## 2021-03-19 DIAGNOSIS — L08.9 WOUND INFECTION: ICD-10-CM

## 2021-03-19 DIAGNOSIS — T14.8XXA WOUND INFECTION: ICD-10-CM

## 2021-03-19 DIAGNOSIS — M86.271 SUBACUTE OSTEOMYELITIS OF RIGHT FOOT: Primary | ICD-10-CM

## 2021-03-19 PROCEDURE — 99214 OFFICE O/P EST MOD 30 MIN: CPT | Mod: S$PBB,,, | Performed by: INTERNAL MEDICINE

## 2021-03-19 PROCEDURE — 99999 PR PBB SHADOW E&M-EST. PATIENT-LVL V: CPT | Mod: PBBFAC,,, | Performed by: INTERNAL MEDICINE

## 2021-03-19 PROCEDURE — 99214 PR OFFICE/OUTPT VISIT, EST, LEVL IV, 30-39 MIN: ICD-10-PCS | Mod: S$PBB,,, | Performed by: INTERNAL MEDICINE

## 2021-03-19 PROCEDURE — 99215 OFFICE O/P EST HI 40 MIN: CPT | Mod: PBBFAC | Performed by: INTERNAL MEDICINE

## 2021-03-19 PROCEDURE — 99999 PR PBB SHADOW E&M-EST. PATIENT-LVL V: ICD-10-PCS | Mod: PBBFAC,,, | Performed by: INTERNAL MEDICINE

## 2021-03-22 ENCOUNTER — OFFICE VISIT (OUTPATIENT)
Dept: VASCULAR SURGERY | Facility: CLINIC | Age: 61
End: 2021-03-22
Payer: MEDICAID

## 2021-03-22 VITALS
WEIGHT: 200 LBS | BODY MASS INDEX: 28.63 KG/M2 | DIASTOLIC BLOOD PRESSURE: 60 MMHG | SYSTOLIC BLOOD PRESSURE: 102 MMHG | HEIGHT: 70 IN

## 2021-03-22 DIAGNOSIS — I70.234 ATHEROSCLEROSIS OF NATIVE ARTERY OF RIGHT LOWER EXTREMITY WITH ULCERATION OF HEEL: Primary | ICD-10-CM

## 2021-03-22 PROCEDURE — 99214 OFFICE O/P EST MOD 30 MIN: CPT | Mod: PBBFAC | Performed by: SURGERY

## 2021-03-22 PROCEDURE — 99024 POSTOP FOLLOW-UP VISIT: CPT | Mod: S$PBB,,, | Performed by: SURGERY

## 2021-03-22 PROCEDURE — 99999 PR PBB SHADOW E&M-EST. PATIENT-LVL IV: ICD-10-PCS | Mod: PBBFAC,,, | Performed by: SURGERY

## 2021-03-22 PROCEDURE — 99024 PR POST-OP FOLLOW-UP VISIT: ICD-10-PCS | Mod: S$PBB,,, | Performed by: SURGERY

## 2021-03-22 PROCEDURE — 99999 PR PBB SHADOW E&M-EST. PATIENT-LVL IV: CPT | Mod: PBBFAC,,, | Performed by: SURGERY

## 2021-03-25 ENCOUNTER — HOSPITAL ENCOUNTER (OUTPATIENT)
Dept: CARDIOLOGY | Facility: HOSPITAL | Age: 61
Discharge: HOME | End: 2021-03-25
Attending: SURGERY
Payer: MEDICAID

## 2021-03-25 ENCOUNTER — OFFICE VISIT (OUTPATIENT)
Dept: VASCULAR SURGERY | Facility: CLINIC | Age: 61
End: 2021-03-25
Payer: MEDICAID

## 2021-03-25 VITALS
SYSTOLIC BLOOD PRESSURE: 102 MMHG | WEIGHT: 199.94 LBS | HEIGHT: 70 IN | DIASTOLIC BLOOD PRESSURE: 58 MMHG | BODY MASS INDEX: 28.62 KG/M2

## 2021-03-25 DIAGNOSIS — I70.234 ATHEROSCLEROSIS OF NATIVE ARTERY OF RIGHT LOWER EXTREMITY WITH ULCERATION OF HEEL: ICD-10-CM

## 2021-03-25 DIAGNOSIS — I70.234 ATHEROSCLEROSIS OF NATIVE ARTERY OF RIGHT LOWER EXTREMITY WITH ULCERATION OF HEEL: Primary | ICD-10-CM

## 2021-03-25 DIAGNOSIS — I70.239 ATHEROSCLEROSIS OF NATIVE ARTERY OF RIGHT LEG WITH ULCERATION: ICD-10-CM

## 2021-03-25 PROCEDURE — 99024 PR POST-OP FOLLOW-UP VISIT: ICD-10-PCS | Mod: S$PBB,,, | Performed by: SURGERY

## 2021-03-25 PROCEDURE — 93922 UPR/L XTREMITY ART 2 LEVELS: CPT | Mod: 26,,, | Performed by: SURGERY

## 2021-03-25 PROCEDURE — 99215 OFFICE O/P EST HI 40 MIN: CPT | Mod: PBBFAC,25 | Performed by: SURGERY

## 2021-03-25 PROCEDURE — 99999 PR PBB SHADOW E&M-EST. PATIENT-LVL V: ICD-10-PCS | Mod: PBBFAC,,, | Performed by: SURGERY

## 2021-03-25 PROCEDURE — 93922 ANKLE BRACHIAL INDICES (ABI): ICD-10-PCS | Mod: 26,,, | Performed by: SURGERY

## 2021-03-25 PROCEDURE — 93922 UPR/L XTREMITY ART 2 LEVELS: CPT

## 2021-03-25 PROCEDURE — 99999 PR PBB SHADOW E&M-EST. PATIENT-LVL V: CPT | Mod: PBBFAC,,, | Performed by: SURGERY

## 2021-03-25 PROCEDURE — 99024 POSTOP FOLLOW-UP VISIT: CPT | Mod: S$PBB,,, | Performed by: SURGERY

## 2021-03-25 RX ORDER — SODIUM CHLORIDE 9 MG/ML
INJECTION, SOLUTION INTRAVENOUS CONTINUOUS
Status: CANCELLED | OUTPATIENT
Start: 2021-03-25

## 2021-03-26 ENCOUNTER — HOSPITAL ENCOUNTER (OUTPATIENT)
Facility: HOSPITAL | Age: 61
Discharge: HOME | End: 2021-03-26
Attending: RADIOLOGY | Admitting: RADIOLOGY
Payer: MEDICAID

## 2021-03-26 ENCOUNTER — HOSPITAL ENCOUNTER (OUTPATIENT)
Dept: INTERVENTIONAL RADIOLOGY/VASCULAR | Facility: HOSPITAL | Age: 61
Discharge: HOME | End: 2021-03-26
Attending: FAMILY MEDICINE
Payer: MEDICAID

## 2021-03-26 VITALS — SYSTOLIC BLOOD PRESSURE: 115 MMHG | DIASTOLIC BLOOD PRESSURE: 66 MMHG

## 2021-03-26 VITALS
BODY MASS INDEX: 28.62 KG/M2 | WEIGHT: 199.94 LBS | DIASTOLIC BLOOD PRESSURE: 65 MMHG | SYSTOLIC BLOOD PRESSURE: 108 MMHG | OXYGEN SATURATION: 99 % | HEART RATE: 74 BPM | TEMPERATURE: 97 F | HEIGHT: 70 IN | RESPIRATION RATE: 18 BRPM

## 2021-03-26 DIAGNOSIS — R18.8 OTHER ASCITES: ICD-10-CM

## 2021-03-26 DIAGNOSIS — I70.234 ATHEROSCLEROSIS OF NATIVE ARTERY OF RIGHT LOWER EXTREMITY WITH ULCERATION OF HEEL: Primary | ICD-10-CM

## 2021-03-26 PROCEDURE — 63600175 PHARM REV CODE 636 W HCPCS: Mod: JG | Performed by: RADIOLOGY

## 2021-03-26 PROCEDURE — P9047 ALBUMIN (HUMAN), 25%, 50ML: HCPCS | Mod: JG | Performed by: RADIOLOGY

## 2021-03-26 PROCEDURE — 49083 IR PARACENTESIS WITH IMAGING: ICD-10-PCS | Mod: ,,, | Performed by: RADIOLOGY

## 2021-03-26 PROCEDURE — A7048 VACUUM DRAIN BOTTLE/TUBE KIT: HCPCS

## 2021-03-26 PROCEDURE — 49083 ABD PARACENTESIS W/IMAGING: CPT | Performed by: RADIOLOGY

## 2021-03-26 RX ORDER — ALBUMIN HUMAN 250 G/1000ML
50 SOLUTION INTRAVENOUS ONCE AS NEEDED
Status: CANCELLED | OUTPATIENT
Start: 2021-03-26 | End: 2032-08-22

## 2021-03-26 RX ORDER — ALBUMIN HUMAN 250 G/1000ML
50 SOLUTION INTRAVENOUS ONCE AS NEEDED
Status: COMPLETED | OUTPATIENT
Start: 2021-03-26 | End: 2021-03-26

## 2021-03-26 RX ADMIN — ALBUMIN (HUMAN) 50 G: 0.25 INJECTION, SOLUTION INTRAVENOUS at 10:03

## 2021-03-27 LAB
LEFT ABI: 1.92
LEFT ARM BP: 133 MMHG
LEFT DORSALIS PEDIS: 255 MMHG
LEFT POSTERIOR TIBIAL: 115 MMHG
RIGHT ABI: 1.92
RIGHT ARM BP: 129 MMHG
RIGHT DORSALIS PEDIS: 255 MMHG
RIGHT POSTERIOR TIBIAL: 49 MMHG

## 2021-03-29 ENCOUNTER — HOSPITAL ENCOUNTER (OUTPATIENT)
Dept: PREADMISSION TESTING | Facility: HOSPITAL | Age: 61
Discharge: HOME | End: 2021-03-29
Attending: FAMILY MEDICINE
Payer: MEDICAID

## 2021-03-29 ENCOUNTER — TELEPHONE (OUTPATIENT)
Dept: VASCULAR SURGERY | Facility: CLINIC | Age: 61
End: 2021-03-29

## 2021-03-29 ENCOUNTER — OFFICE VISIT (OUTPATIENT)
Dept: OPHTHALMOLOGY | Facility: CLINIC | Age: 61
End: 2021-03-29
Payer: MEDICAID

## 2021-03-29 DIAGNOSIS — H40.53X3 NEOVASCULAR GLAUCOMA OF BOTH EYES, SEVERE STAGE: ICD-10-CM

## 2021-03-29 DIAGNOSIS — H35.373 EPIRETINAL MEMBRANE, BOTH EYES: ICD-10-CM

## 2021-03-29 DIAGNOSIS — H40.52X3 NEOVASCULAR GLAUCOMA OF LEFT EYE, SEVERE STAGE: Primary | ICD-10-CM

## 2021-03-29 DIAGNOSIS — I70.234 ATHEROSCLEROSIS OF NATIVE ARTERY OF RIGHT LOWER EXTREMITY WITH ULCERATION OF HEEL: Primary | ICD-10-CM

## 2021-03-29 DIAGNOSIS — Z96.1 PSEUDOPHAKIA: ICD-10-CM

## 2021-03-29 DIAGNOSIS — H40.51X1 NEOVASCULAR GLAUCOMA, RIGHT EYE, MILD STAGE: ICD-10-CM

## 2021-03-29 DIAGNOSIS — H21.1X1 RUBEOSIS IRIDIS, RIGHT: ICD-10-CM

## 2021-03-29 DIAGNOSIS — Z01.818 PREOP TESTING: ICD-10-CM

## 2021-03-29 DIAGNOSIS — Z96.89 HISTORY OF GLAUCOMA TUBE SHUNT PROCEDURE: ICD-10-CM

## 2021-03-29 LAB — SARS-COV-2 RDRP RESP QL NAA+PROBE: NEGATIVE

## 2021-03-29 PROCEDURE — 92012 PR EYE EXAM, EST PATIENT,INTERMED: ICD-10-PCS | Mod: S$PBB,,, | Performed by: OPHTHALMOLOGY

## 2021-03-29 PROCEDURE — 92020 PR SPECIAL EYE EVAL,GONIOSCOPY: ICD-10-PCS | Mod: S$PBB,,, | Performed by: OPHTHALMOLOGY

## 2021-03-29 PROCEDURE — 99999 PR PBB SHADOW E&M-EST. PATIENT-LVL III: CPT | Mod: PBBFAC,,, | Performed by: OPHTHALMOLOGY

## 2021-03-29 PROCEDURE — 92012 INTRM OPH EXAM EST PATIENT: CPT | Mod: S$PBB,,, | Performed by: OPHTHALMOLOGY

## 2021-03-29 PROCEDURE — 92020 GONIOSCOPY: CPT | Mod: S$PBB,,, | Performed by: OPHTHALMOLOGY

## 2021-03-29 PROCEDURE — U0002 COVID-19 LAB TEST NON-CDC: HCPCS | Performed by: SURGERY

## 2021-03-29 PROCEDURE — 92020 GONIOSCOPY: CPT | Mod: PBBFAC | Performed by: OPHTHALMOLOGY

## 2021-03-29 PROCEDURE — 99213 OFFICE O/P EST LOW 20 MIN: CPT | Mod: PBBFAC,25 | Performed by: OPHTHALMOLOGY

## 2021-03-29 PROCEDURE — 99999 PR PBB SHADOW E&M-EST. PATIENT-LVL III: ICD-10-PCS | Mod: PBBFAC,,, | Performed by: OPHTHALMOLOGY

## 2021-03-30 ENCOUNTER — HOSPITAL ENCOUNTER (OUTPATIENT)
Facility: HOSPITAL | Age: 61
Discharge: HOME | End: 2021-03-30
Attending: SURGERY | Admitting: SURGERY
Payer: MEDICAID

## 2021-03-30 ENCOUNTER — HOSPITAL ENCOUNTER (OUTPATIENT)
Dept: INTERVENTIONAL RADIOLOGY/VASCULAR | Facility: HOSPITAL | Age: 61
Discharge: HOME | End: 2021-03-30
Attending: SURGERY | Admitting: SURGERY
Payer: MEDICAID

## 2021-03-30 VITALS
SYSTOLIC BLOOD PRESSURE: 113 MMHG | OXYGEN SATURATION: 95 % | DIASTOLIC BLOOD PRESSURE: 65 MMHG | HEART RATE: 75 BPM | RESPIRATION RATE: 14 BRPM

## 2021-03-30 VITALS
WEIGHT: 199.94 LBS | BODY MASS INDEX: 28.69 KG/M2 | OXYGEN SATURATION: 96 % | RESPIRATION RATE: 16 BRPM | HEART RATE: 76 BPM | DIASTOLIC BLOOD PRESSURE: 54 MMHG | SYSTOLIC BLOOD PRESSURE: 97 MMHG | TEMPERATURE: 98 F

## 2021-03-30 DIAGNOSIS — I70.239 ATHEROSCLEROSIS OF NATIVE ARTERY OF RIGHT LEG WITH ULCERATION: ICD-10-CM

## 2021-03-30 DIAGNOSIS — I70.234 ATHEROSCLEROSIS OF NATIVE ARTERY OF RIGHT LOWER EXTREMITY WITH ULCERATION OF HEEL: Primary | ICD-10-CM

## 2021-03-30 DIAGNOSIS — Z01.818 PREOP TESTING: ICD-10-CM

## 2021-03-30 DIAGNOSIS — I70.234 ATHEROSCLEROSIS OF NATIVE ARTERY OF RIGHT LOWER EXTREMITY WITH ULCERATION OF HEEL: ICD-10-CM

## 2021-03-30 LAB — POCT GLUCOSE: 163 MG/DL (ref 70–110)

## 2021-03-30 PROCEDURE — C1894 INTRO/SHEATH, NON-LASER: HCPCS

## 2021-03-30 PROCEDURE — 25500020 PHARM REV CODE 255: Performed by: SURGERY

## 2021-03-30 PROCEDURE — 37228 HC TIB/PER REVASC W/TLA: CPT | Performed by: SURGERY

## 2021-03-30 PROCEDURE — 99152 MOD SED SAME PHYS/QHP 5/>YRS: CPT | Mod: ,,, | Performed by: SURGERY

## 2021-03-30 PROCEDURE — 99152 MOD SED SAME PHYS/QHP 5/>YRS: CPT | Performed by: SURGERY

## 2021-03-30 PROCEDURE — 37228 PR TIB/PER REVASC W/TLA: CPT | Mod: 79,RT,, | Performed by: SURGERY

## 2021-03-30 PROCEDURE — 37226 PR FEM/POPL REVASC W/STENT: ICD-10-PCS | Mod: 79,51,RT, | Performed by: SURGERY

## 2021-03-30 PROCEDURE — 99153 MOD SED SAME PHYS/QHP EA: CPT | Performed by: SURGERY

## 2021-03-30 PROCEDURE — 63600175 PHARM REV CODE 636 W HCPCS: Performed by: SURGERY

## 2021-03-30 PROCEDURE — 37226 PR FEM/POPL REVASC W/STENT: CPT | Mod: 79,51,RT, | Performed by: SURGERY

## 2021-03-30 PROCEDURE — 75710 ARTERY X-RAYS ARM/LEG: CPT | Performed by: SURGERY

## 2021-03-30 PROCEDURE — 99152 PR MOD CONSCIOUS SEDATION, SAME PHYS, 5+ YRS, FIRST 15 MIN: ICD-10-PCS | Mod: ,,, | Performed by: SURGERY

## 2021-03-30 PROCEDURE — 25000003 PHARM REV CODE 250: Performed by: SURGERY

## 2021-03-30 PROCEDURE — 75710 PR  ANGIO EXTREMITY UNILAT: ICD-10-PCS | Mod: 26,59,, | Performed by: SURGERY

## 2021-03-30 PROCEDURE — 37226 HC FEM/POPL REVASC W/STENT: CPT | Performed by: SURGERY

## 2021-03-30 PROCEDURE — 37228 PR TIB/PER REVASC W/TLA: ICD-10-PCS | Mod: 79,RT,, | Performed by: SURGERY

## 2021-03-30 PROCEDURE — 75710 ARTERY X-RAYS ARM/LEG: CPT | Mod: 26,59,, | Performed by: SURGERY

## 2021-03-30 PROCEDURE — 82962 GLUCOSE BLOOD TEST: CPT | Performed by: SURGERY

## 2021-03-30 RX ORDER — CLINDAMYCIN PHOSPHATE 900 MG/50ML
900 INJECTION, SOLUTION INTRAVENOUS
Status: DISCONTINUED | OUTPATIENT
Start: 2021-03-30 | End: 2021-03-30 | Stop reason: HOSPADM

## 2021-03-30 RX ORDER — CLINDAMYCIN PHOSPHATE 600 MG/50ML
INJECTION, SOLUTION INTRAVENOUS
Status: COMPLETED | OUTPATIENT
Start: 2021-03-30 | End: 2021-03-30

## 2021-03-30 RX ORDER — PROTAMINE SULFATE 10 MG/ML
INJECTION, SOLUTION INTRAVENOUS CODE/TRAUMA/SEDATION MEDICATION
Status: COMPLETED | OUTPATIENT
Start: 2021-03-30 | End: 2021-03-30

## 2021-03-30 RX ORDER — SODIUM CHLORIDE 9 MG/ML
INJECTION, SOLUTION INTRAVENOUS CONTINUOUS
Status: DISCONTINUED | OUTPATIENT
Start: 2021-03-30 | End: 2021-03-30 | Stop reason: HOSPADM

## 2021-03-30 RX ORDER — HEPARIN SODIUM 1000 [USP'U]/ML
INJECTION, SOLUTION INTRAVENOUS; SUBCUTANEOUS CODE/TRAUMA/SEDATION MEDICATION
Status: COMPLETED | OUTPATIENT
Start: 2021-03-30 | End: 2021-03-30

## 2021-03-30 RX ORDER — FENTANYL CITRATE 50 UG/ML
INJECTION, SOLUTION INTRAMUSCULAR; INTRAVENOUS CODE/TRAUMA/SEDATION MEDICATION
Status: COMPLETED | OUTPATIENT
Start: 2021-03-30 | End: 2021-03-30

## 2021-03-30 RX ORDER — SODIUM CHLORIDE 9 MG/ML
INJECTION, SOLUTION INTRAVENOUS
Status: COMPLETED | OUTPATIENT
Start: 2021-03-30 | End: 2021-03-30

## 2021-03-30 RX ORDER — MIDAZOLAM HYDROCHLORIDE 1 MG/ML
INJECTION INTRAMUSCULAR; INTRAVENOUS CODE/TRAUMA/SEDATION MEDICATION
Status: COMPLETED | OUTPATIENT
Start: 2021-03-30 | End: 2021-03-30

## 2021-03-30 RX ADMIN — FENTANYL CITRATE 50 MCG: 50 INJECTION, SOLUTION INTRAMUSCULAR; INTRAVENOUS at 08:03

## 2021-03-30 RX ADMIN — FENTANYL CITRATE 50 MCG: 50 INJECTION, SOLUTION INTRAMUSCULAR; INTRAVENOUS at 09:03

## 2021-03-30 RX ADMIN — MIDAZOLAM HYDROCHLORIDE 1 MG: 1 INJECTION, SOLUTION INTRAMUSCULAR; INTRAVENOUS at 08:03

## 2021-03-30 RX ADMIN — SODIUM CHLORIDE: 0.9 INJECTION, SOLUTION INTRAVENOUS at 07:03

## 2021-03-30 RX ADMIN — PROTAMINE SULFATE 25 MG: 10 INJECTION, SOLUTION INTRAVENOUS at 11:03

## 2021-03-30 RX ADMIN — MIDAZOLAM HYDROCHLORIDE 0.5 MG: 1 INJECTION, SOLUTION INTRAMUSCULAR; INTRAVENOUS at 08:03

## 2021-03-30 RX ADMIN — FENTANYL CITRATE 50 MCG: 50 INJECTION, SOLUTION INTRAMUSCULAR; INTRAVENOUS at 10:03

## 2021-03-30 RX ADMIN — HEPARIN SODIUM 1000 UNITS: 1000 INJECTION, SOLUTION INTRAVENOUS; SUBCUTANEOUS at 09:03

## 2021-03-30 RX ADMIN — HEPARIN SODIUM 1000 UNITS: 1000 INJECTION, SOLUTION INTRAVENOUS; SUBCUTANEOUS at 10:03

## 2021-03-30 RX ADMIN — HEPARIN SODIUM 9000 UNITS: 1000 INJECTION, SOLUTION INTRAVENOUS; SUBCUTANEOUS at 08:03

## 2021-03-30 RX ADMIN — PROTAMINE SULFATE 5 MG: 10 INJECTION, SOLUTION INTRAVENOUS at 11:03

## 2021-03-30 RX ADMIN — SODIUM CHLORIDE 50 ML/HR: 9 INJECTION, SOLUTION INTRAVENOUS at 08:03

## 2021-03-30 RX ADMIN — CLINDAMYCIN PHOSPHATE 900 MG: 12 INJECTION, SOLUTION INTRAVENOUS at 08:03

## 2021-03-30 RX ADMIN — IOHEXOL 160 ML: 300 INJECTION, SOLUTION INTRAVENOUS at 11:03

## 2021-04-03 DIAGNOSIS — I70.234 ATHEROSCLEROSIS OF NATIVE ARTERY OF RIGHT LOWER EXTREMITY WITH ULCERATION OF HEEL: Primary | ICD-10-CM

## 2021-05-17 PROBLEM — S91.302A WOUND OF LEFT FOOT: Status: ACTIVE | Noted: 2021-01-01

## 2021-05-17 PROBLEM — I50.42 CHRONIC COMBINED SYSTOLIC AND DIASTOLIC HEART FAILURE: Status: ACTIVE | Noted: 2021-01-01

## 2021-05-17 PROBLEM — I96 DRY GANGRENE: Status: ACTIVE | Noted: 2021-01-01

## 2021-06-23 PROBLEM — T87.9 BKA STUMP COMPLICATION: Status: ACTIVE | Noted: 2021-01-01

## 2021-12-01 NOTE — H&P
Delivery Summary    Patient: Patito Liu MRN: [id number]  SSN: [id number]    YOB: 1992  Age: 30 y.o. Sex: female       Information for the patient's :  Zeinab Nolasco [[id number]]     Uncomplicated TSVD with complex presentation of fetal head and right hand. No nuchal cord. Easy delivery of anterior shoulder.  was vigorous and crying immediately after delivery, then placed directly on maternal abdomen. Mouth and nose suctioned. Delayed cord clamping performed, then cut by the father of the baby. The placenta delivered intact. A second degree perineal laceration was repaired using 3-0 rapid in a running fashion. Fundus was firm and bleeding appropriate. Labor Events:    Labor: No    Steroids: None   Cervical Ripening Date/Time:       Cervical Ripening Type:     Antibiotics During Labor: No   Rupture Identifier:      Rupture Date/Time: 2021 5:43 AM   Rupture Type: AROM   Amniotic Fluid Volume:      Amniotic Fluid Description: Clear    Amniotic Fluid Odor: None    Induction: None       Induction Date/Time:        Indications for Induction:      Augmentation:     Augmentation Date/Time:      Indications for Augmentation:     Labor complications:          Additional complications:        Delivery Events:  Indications For Episiotomy:     Episiotomy: None   Perineal Laceration(s): 2nd   Repaired: Yes   Periurethral Laceration Location:      Repaired:     Labial Laceration Location:     Repaired:     Sulcal Laceration Location:     Repaired:     Vaginal Laceration Location:     Repaired:     Cervical Laceration Location:     Repaired:     Repair Suture: Rapide 3-0   Number of Repair Packets: 1   Estimated Blood Loss (ml):  ml   Quantitative Blood Loss (ml)                Delivery Date: 2021    Delivery Time: 10:19 AM  Delivery Type: Vaginal, Spontaneous  Sex:  Female    Gestational Age: 39w6d   Delivery Clinician:  Jj Greco MD  Living Status: Living Radiology History & Physical      SUBJECTIVE:     Chief Complaint: recurrent ascites    History of Present Illness:  Marvin Ray is a 60 y.o. male who presents for paracentesis  Past Medical History:   Diagnosis Date    Arthritis     Cellulitis     CKD (chronic kidney disease), stage III     Coronary artery disease     Diabetes mellitus     Diabetic retinopathy     Diabetic ulcer of left foot     Glaucoma     Gout     Hyperlipemia     Hypertension     ICD (implantable cardioverter-defibrillator) in place 11/02/2018    Left chest    Non-pressure chronic ulcer of other part of left foot with fat layer exposed 10/23/2018    PVD (peripheral vascular disease)     Type 2 diabetes mellitus with left diabetic foot ulcer 10/29/2018    Unsteady gait     uses a wheelchair     Past Surgical History:   Procedure Laterality Date    AHMED GLAUCOMA IMPLANT Left 2011    DONE AT [facility]    AORTOGRAPHY WITH SERIALOGRAPHY Left 4/30/2019    Procedure: AORTOGRAM, WITH SERIALOGRAPHY, LEFT LOWER EXTREMITY, POSSIBLE INTERVENTION;  Surgeon: Mitch Chan MD;  Location: [facility] OR;  Service: Vascular;  Laterality: Left;  RN PRE OP 4-23-19.  NO H & P, No Cosent  [place]    BAERVELDT GLAUCOMA IMPLANT Left 2012    WITH CATARACT EXTRACTION//DONE AT [facility]    CARDIAC CATHETERIZATION Left 05/2016    CARDIAC DEFIBRILLATOR PLACEMENT Left 11/02/2018    CATARACT EXTRACTION W/  INTRAOCULAR LENS IMPLANT Left 2012    WITH BAERVEDT//DONE AT [facility]    CATARACT EXTRACTION W/  INTRAOCULAR LENS IMPLANT Right 09/26/2018    COMPLEX ()    CB DESTRUCTION WITH CYCLO G6 Left 02/15/2017        CYST REMOVAL      DEBRIDEMENT OF FOOT  12/28/2018    Procedure: DEBRIDEMENT, FOOT;  Surgeon: Mitch Chan MD;  Location: [facility] OR;  Service: Vascular;;    DEBRIDEMENT OF FOOT  6/5/2019    Procedure: DEBRIDEMENT, FOOT;  Surgeon: Mitch Chan MD;  Location: [facility] OR;  Service: Vascular;;    EXAMINATION  Delivery Location: L&D room 5          APGARS  One minute Five minutes Ten minutes   Skin color: 1   1        Heart rate: 2   2        Grimace: 2   2        Muscle tone: 2   2        Breathin   2        Totals: 9   9            Presentation: Compound    Position:        Resuscitation Method:  Tactile Stimulation;Suctioning-bulb     Meconium Stained: None      Cord Information: 3 Vessels  Complications: None  Cord around:    Delayed cord clamping? Yes  Cord clamped date/time:2021 10:22 AM  Disposition of Cord Blood: Lab    Blood Gases Sent?: No    Placenta:  Date/Time: 2021 10:24 AM  Removal: Spontaneous      Appearance: Normal      Measurements:  Birth Weight:        Birth Length:        Head Circumference:        Chest Circumference:       Abdominal Girth: Other Providers:   Janell Heimlich Berry Him, Obstetrician;Primary Nurse;Primary  Nurse           Group B Strep:   Lab Results   Component Value Date/Time    GrBStrep, External Negative 2021 12:00 AM     Information for the patient's :  Luis Blane [[id number]]     Lab Results   Component Value Date/Time    ABO/Rh(D) O POSITIVE 2021 10:30 AM    KENDRICK IgG NEG 2021 10:30 AM    Bilirubin if KENDRICK pos: IF DIRECT ANYI POSITIVE, BILIRUBIN TO FOLLOW 2021 10:30 AM      No results for input(s): PCO2CB, PO2CB, HCO3I, SO2I, IBD, PTEMPI, SPECTI, PHICB, ISITE, IDEV, IALLEN in the last 72 hours. UNDER ANESTHESIA Left 12/5/2018    Procedure: Exam under anesthesia, left foot debridement, washout and all other indicated procedures;  Surgeon: Mitch Wall MD;  Location: [facility] OR;  Service: Vascular;  Laterality: Left;  1030AM START  RN PREOP 12/3/2018-----AICD  SEEN BY DR JAMES 12/4    EXAMINATION UNDER ANESTHESIA Left 2/13/2019    Procedure: LEFT FOOT WOUND DEBRIDEMENT, WASHOUT AND ALL OTHER INDICATED PROCEDURES;  Surgeon: Mitch Wall MD;  Location: [facility] OR;  Service: Vascular;  Laterality: Left;  requesting 1st case start  THIS CASE 1ST PER DR WALL ON 2/1/19 @ 844AM -[place]  RN PREOP 2/6/2019  HAS CARDIAC CLEARANCE    EXAMINATION UNDER ANESTHESIA Left 12/28/2018    Procedure: Exam under anesthesia, left foot debridement, left foot washout, possible left second through fifth metatarsal resection;  Surgeon: Mitch Wall MD;  Location: [facility] OR;  Service: Vascular;  Laterality: Left;  1:00pm start per julissa @ 8:58am  RN  PHONE PRE OP 12-27-18--=Pt coming from [facility]  NEED CONSENT    EXAMINATION UNDER ANESTHESIA Left 6/5/2019    Procedure: LEFT FOOT DEBRIDEMENT, WASHOUT AND ALL OTHER INDICATED PROCEDURES;  Surgeon: Mitch Wall MD;  Location: [facility] OR;  Service: Vascular;  Laterality: Left;  RN PRE OP 5-31-19------ICD------NEED CONSENT    INCISION AND DRAINAGE Left 11/14/2018    Procedure: Incision and Drainage, left lower extremity debridement, washout;  Surgeon: Mitch Wall MD;  Location: [facility] OR;  Service: Vascular;  Laterality: Left;    INCISION AND DRAINAGE Left 11/16/2018    Procedure: INCISION AND DRAINAGE;  Surgeon: Mitch Wall MD;  Location: [facility] OR;  Service: Vascular;  Laterality: Left;    INTRAOCULAR PROSTHESES INSERTION Right 9/26/2018    Procedure: INSERTION, IOL PROSTHESIS;  Surgeon: Perla Cortés MD;  Location: [facility] OR [address] [place] [address];  Service: Ophthalmology;  Laterality: Right;    PERITONEOCENTESIS N/A 3/1/2019    Procedure:  PARACENTESIS, ABDOMINAL, INITIAL;  Surgeon: Dosc Diagnostic Provider;  Location: [facility] OR;  Service: Radiology;  Laterality: N/A;    PERITONEOCENTESIS N/A 3/25/2019    Procedure: PARACENTESIS, ABDOMINAL, INITIAL;  Surgeon: Dosc Diagnostic Provider;  Location: [facility] OR;  Service: Radiology;  Laterality: N/A;    PERITONEOCENTESIS N/A 7/22/2019    Procedure: PARACENTESIS, ABDOMINAL, INITIAL;  Surgeon: Dosc Diagnostic Provider;  Location: [facility] OR;  Service: Radiology;  Laterality: N/A;    PERITONEOCENTESIS N/A 8/16/2019    Procedure: PARACENTESIS, ABDOMINAL, INITIAL;  Surgeon: Dosc Diagnostic Provider;  Location: [facility] OR;  Service: Radiology;  Laterality: N/A;    PERITONEOCENTESIS N/A 9/26/2019    Procedure: PARACENTESIS, ABDOMINAL;  Surgeon: Dosc Diagnostic Provider;  Location: [facility] OR;  Service: Radiology;  Laterality: N/A;    PERITONEOCENTESIS N/A 10/11/2019    Procedure: PARACENTESIS, ABDOMINAL;  Surgeon: Dosc Diagnostic Provider;  [facility]: [facility] OR;  Service: Radiology;  Laterality: N/A;    PERITONEOCENTESIS N/A 10/31/2019    Procedure: PARACENTESIS, ABDOMINAL;  Surgeon: Dosc Diagnostic Provider;  [facility]: [facility] OR;  Service: Radiology;  Laterality: N/A;    PERITONEOCENTESIS N/A 11/18/2019    Procedure: PARACENTESIS, ABDOMINAL;  Surgeon: Dosc Diagnostic Provider;  [facility];  Service: Radiology;  Laterality: N/A;    PERITONEOCENTESIS N/A 12/16/2019    Procedure: PARACENTESIS, ABDOMINAL;  Surgeon: Dosc Diagnostic Provider;  Location: [facility] OR;  Service: Radiology;  Laterality: N/A;  2PM START    PERITONEOCENTESIS N/A 2/7/2020    Procedure: PARACENTESIS, ABDOMINAL;  Surgeon: Dosc Diagnostic Provider;  Location: [facility] OR;  Service: Radiology;  Laterality: N/A;  REQUESTED 10:30A START    PERITONEOCENTESIS N/A 2/19/2020    Procedure: PARACENTESIS, ABDOMINAL;  Surgeon: Dosc Diagnostic Provider;  Location: [facility] OR;  Service: Radiology;  Laterality: N/A;    PERITONEOCENTESIS N/A 2/28/2020    Procedure:  PARACENTESIS, ABDOMINAL;  Surgeon: Dosc Diagnostic Provider;  Location: [facility] OR;  Service: Radiology;  Laterality: N/A;  REQUESTED 10AM START    PHACOEMULSIFICATION OF CATARACT Right 9/26/2018    Procedure: PHACOEMULSIFICATION, CATARACT;  Surgeon: Perla Cortés MD;  [facility];  Service: Ophthalmology;  Laterality: Right;    REPEAT ABDOMINAL PARACENTESIS N/A 2/11/2019    Procedure: PARACENTESIS, ABDOMINAL, REPEAT;  Surgeon: Dosc Diagnostic Provider;  Location: [facility] OR;  Service: Radiology;  Laterality: N/A;  1PM START    REPEAT ABDOMINAL PARACENTESIS N/A 9/12/2019    Procedure: PARACENTESIS, ABDOMINAL, REPEAT;  Surgeon: Dosc Diagnostic Provider;  Location: [facility] OR;  Service: Radiology;  Laterality: N/A;  9AM START    REPEAT ABDOMINAL PARACENTESIS N/A 12/2/2019    Procedure: PARACENTESIS, ABDOMINAL, REPEAT;  Surgeon: Dosc Diagnostic Provider;  Location: [facility] OR;  Service: Radiology;  Laterality: N/A;  3PM START    REPEAT ABDOMINAL PARACENTESIS N/A 1/10/2020    Procedure: PARACENTESIS, ABDOMINAL, REPEAT;  Surgeon: Dosc Diagnostic Provider;  Location: [facility] OR;  Service: Radiology;  Laterality: N/A;  1130AM START    REPEAT ABDOMINAL PARACENTESIS N/A 1/20/2020    Procedure: PARACENTESIS, ABDOMINAL, REPEAT;  Surgeon: Dos Diagnostic Provider;  Location: [facility] OR;  Service: Radiology;  Laterality: N/A;  1PM START    REPEAT ABDOMINAL PARACENTESIS N/A 1/31/2020    Procedure: PARACENTESIS, ABDOMINAL, REPEAT;  Surgeon: Dosc Diagnostic Provider;  Location: [facility] OR;  Service: Radiology;  Laterality: N/A;  10AM START    REPEAT ABDOMINAL PARACENTESIS N/A 3/16/2020    Procedure: PARACENTESIS, ABDOMINAL, REPEAT;  Surgeon: Dosc Diagnostic Provider;  Location: [facility] OR;  Service: Radiology;  Laterality: N/A;  10AM START    REPEAT ABDOMINAL PARACENTESIS N/A 3/30/2020    Procedure: PARACENTESIS, ABDOMINAL, REPEAT;  Surgeon: Dosc Diagnostic Provider;  Location: [facility] OR;  Service: Radiology;  Laterality: N/A;     REPEAT ABDOMINAL PARACENTESIS N/A 4/9/2020    Procedure: PARACENTESIS, ABDOMINAL, REPEAT;  Surgeon: [facility] Diagnostic Provider;  Location: [facility] OR;  Service: Radiology;  Laterality: N/A;  1130AM START    REPEAT ABDOMINAL PARACENTESIS N/A 5/8/2020    Procedure: PARACENTESIS, ABDOMINAL, REPEAT;  Surgeon: [facility] Diagnostic Provider;  Location: [facility] OR;  Service: Radiology;  Laterality: N/A;  9AM START    REPEAT ABDOMINAL PARACENTESIS N/A 5/21/2020    Procedure: PARACENTESIS, ABDOMINAL, REPEAT;  Surgeon: [facility] Diagnostic Provider;  Location: [facility] OR;  Service: Radiology;  Laterality: N/A;  10AM START    REVISION OF PROCEDURE INVOLVING GLAUCOMA DRAINAGE DEVICE Left 10/2/2019    EXTRUDING BAERVELDT /WITH PERICARDIAL PATCH GRAFT ()    Right foot surgery  10/2014    TOE AMPUTATION Right     first and second    TOE AMPUTATION Left 11/16/2018    Procedure: AMPUTATION, TOES  2-5;  Surgeon: Mitch Chan MD;  Location: [facility] OR;  Service: Vascular;  Laterality: Left;    TOE AMPUTATION Left 12/5/2018    Procedure: AMPUTATION, TOE;  Surgeon: Mitch Chan MD;  Location: [facility] OR;  Service: Vascular;  Laterality: Left;  left great toe    TONSILLECTOMY         Home Meds:   Prior to Admission medications    Medication Sig Start Date End Date Taking? Authorizing Provider   allopurinoL (ZYLOPRIM) 300 MG tablet TAKE 1 TABLET(300 MG) BY MOUTH EVERY DAY 2/23/20   Rubén Davis MD   aspirin (ECOTRIN) 81 MG EC tablet Take 81 mg by mouth once daily.    Historical Provider, MD   bisacodyl (DULCOLAX) 5 mg EC tablet Take 5 mg by mouth daily as needed for Constipation.    Historical Provider, MD   brimonidine 0.2% (ALPHAGAN) 0.2 % Drop Place 1 drop into both eyes 3 (three) times daily. 12/12/19   Perla Cortés MD   carvediloL (COREG) 3.125 MG tablet TAKE 1 TABLET(3.125 MG) BY MOUTH TWICE DAILY 4/24/20   Rubén Davis MD   coenzyme Q10 100 mg capsule Take 100 mg by mouth every morning.    Historical  "Provider, MD   dorzolamide-timolol 2-0.5% (COSOPT) 22.3-6.8 mg/mL ophthalmic solution Place 1 drop into both eyes 3 (three) times daily. 12/12/19   Perla Cortés MD   ezetimibe (ZETIA) 10 mg tablet TAKE 1 TABLET(10 MG) BY MOUTH EVERY DAY 4/16/20   Rubén Davis MD   fish oil-omega-3 fatty acids 300-1,000 mg capsule Take 1 capsule by mouth 2 (two) times daily. 1/23/19   Sara Ching MD   gabapentin (NEURONTIN) 100 MG capsule TAKE 2 CAPSULES(200 MG) BY MOUTH EVERY EVENING 5/31/20   Rubén Davis MD   insulin degludec-liraglutide (XULTOPHY 100/3.6) 100 unit-3.6 mg /mL (3 mL) InPn Inject 34 Units into the skin once daily. 1/28/20   Sheryl Madison NP   MULTIVIT,THER IRON,CA,FA & MIN (MULTIVITAMIN) Tab Take 1 tablet by mouth every morning.     Historical Provider, MD   pen needle, diabetic 31 gauge x 1/4" Ndle Use as directed  Patient not taking: Reported on 5/21/2020 8/11/16   Mike Bass MD   torsemide (DEMADEX) 20 MG Tab Take 4 tablets (80 mg total) by mouth 2 (two) times daily. 6/3/20 7/3/20  Rubén Davis MD     Anticoagulants/Antiplatelets: aspirin    Allergies:   Review of patient's allergies indicates:   Allergen Reactions    Statins-hmg-coa reductase inhibitors      Generalized Pain    Onglyza [saxagliptin]     Penicillins Rash     Sedation History:  no adverse reactions    Review of Systems:   Hematological: negative, no known coagulopathies  Respiratory: no shortness of breath  Cardiovascular: no chest pain  Gastrointestinal: no abdominal pain  Genito-Urinary: no dysuria  Musculoskeletal: negative  Neurological: no TIA or stroke symptoms         OBJECTIVE:     Vital Signs (Most Recent)       Physical Exam:  ASA: 2  Mallampati: N/A    General: no acute distress  Mental Status: alert and oriented to person, place and time  HEENT: normocephalic, atraumatic  Chest: unlabored breathing  Heart: regular heart rate  Abdomen: distended  Extremity: moves all extremities    Laboratory  Lab " Results   Component Value Date    INR 1.1 05/31/2019       Lab Results   Component Value Date    WBC 5.13 08/22/2019    HGB 9.9 (L) 08/22/2019    HCT 32.0 (L) 08/22/2019    MCV 86 08/22/2019     08/22/2019      Lab Results   Component Value Date    GLU 83 08/22/2019     08/22/2019    K 3.6 08/22/2019     08/22/2019    CO2 28 08/22/2019    BUN 39 (H) 08/22/2019    CREATININE 1.1 08/22/2019    CALCIUM 8.8 08/22/2019    MG 2.1 05/31/2019    ALT 12 08/22/2019    AST 14 08/22/2019    ALBUMIN 2.8 (L) 08/22/2019    BILITOT 0.5 08/22/2019       ASSESSMENT/PLAN:     Sedation Plan: local anesthesia  Patient will undergo paracentesis.    Vin Kong MD  Staff Radiologist  Department of Radiology  Pager: [phone]

## 2021-12-11 NOTE — PLAN OF CARE
ICU End of Shift Summary. See flowsheets for vital signs and detailed assessment.    Changes this shift: Patient cooled to 34 C via thermoguard. Pupillometer q4, pupils sluggish, equal and round. Weaned off propofol, continues on versed and fentanyl. SR, PVCs occasional, on levo, vaso, epi to maintain MAPs >65. ECHO done. CMV 50%, PEEP 7. 4E. Minimal urine output. 4 amps bicarb given. RLE pedal pulse not palpable, MDs aware. 1g calcium given. Wife updated at bedside. ABGs monitored q2h, increased acidosis and lactic becoming more elevated. Sent emergently to cath lab and transferred to     Plan: Transfer to  d/t balloon pump needs.  Problem: Adult Inpatient Plan of Care  Goal: Plan of Care Review  Outcome: Declining     Problem: Adult Inpatient Plan of Care  Goal: Patient-Specific Goal (Individualized)  Outcome: Declining      [facility]    HOME HEALTH ORDERS  FACE TO FACE ENCOUNTER    Patient Name: Marvin Ray  YOB: 1960    PCP: Rubén Davis MD   PCP Address: [facility] JULIO UGALDE / JONELLE OWEN  PCP Phone Number: [phone]  PCP Fax: [phone]    Encounter Date: 12/11/2018    Admit to Home Health    Diagnoses:  Active Hospital Problems    Diagnosis  POA    *Foot infection [L08.9]  Yes    Chronic combined systolic and diastolic heart failure [I50.42]  Yes     Chronic    Stage 3 chronic kidney disease [N18.3]  Yes     Chronic    CAD (coronary artery disease) [I25.10]  Yes     Chronic    Essential hypertension [I10]  Yes     Chronic    DM type 2 with diabetic peripheral neuropathy [E11.42]  Yes     Chronic    HLD (hyperlipidemia) [E78.5]  Yes     Chronic      Resolved Hospital Problems   No resolved problems to display.       Future Appointments   Date Time Provider Department Center   12/12/2018 11:00 AM [facility] IR1 [facility] RAD IR [facility] Hos   12/13/2018  1:00 PM Mitch Chan MD [facility] VAS JAYMIE [facility] Cli   12/13/2018  3:00 PM Fannie Samano DO [facility] NEURO [facility]   1/7/2019 10:00 AM LAB, [facility] DRAW STATION [facility] LAB [facility]   1/8/2019  8:40 AM LISA Jackman MD [facility] OPHTHAL [facility]   1/11/2019 11:00 AM Sheryl Madison NP [facility] ENDO [facility]   2/8/2019  2:30 PM Perla Cortés MD [facility]   3/19/2019  1:00 PM Pernell Greer MD [facility] CARDIO [facility]           I have seen and examined this patient face to face today. My clinical findings that support the need for the home health skilled services and home bound status are the following:  Weakness/numbness causing balance and gait disturbance due to Infection making it taxing to leave home.  Requiring assistive device to leave home due to unsteady gait caused by  Infection.  Medical restrictions requiring assistance of another human to leave home due to  Unstable ambulation and Wound  care needs.    Allergies:  Review of patient's allergies indicates:   Allergen Reactions    Statins-hmg-coa reductase inhibitors      Generalized Pain    Onglyza [saxagliptin]     Penicillins Rash       Diet: cardiac diet and diabetic diet: 2000 calorie    Activities: activity as tolerated    Nursing:   SN to complete comprehensive assessment including routine vital signs. Instruct on disease process and s/s of complications to report to MD. Review/verify medication list sent home with the patient at time of discharge  and instruct patient/caregiver as needed. Frequency may be adjusted depending on start of care date.    Notify MD if SBP > 160 or < 90; DBP > 90 or < 50; HR > 120 or < 50; Temp > 101; Other:         CONSULTS:    Aide to provide assistance with personal care, ADLs, and vital signs.       MISCELLANEOUS CARE:  Diabetic Care:   SN to perform and educate Diabetic management with blood glucose monitoring:   MISCELLANEOUS CARE:  Labs Weekly CMP, ESR, CRP -Labs faxed to [phone].   Home Infusion Therapy:   SN to perform Infusion Therapy/Central Line Care.  Review Central Line Care & Central Line Flush with patient.    Scrub the Hub: Prior to accessing the line, always perform a 30 second alcohol scrub  Each lumen of the central line is to be flushed at least daily with 10 mL Normal Saline and 3 mL Heparin flush (10 units/mL)  Skilled Nurse (SN) may draw blood from IV access  Blood Draw Procedure:   - Aspirate at least 5 mL of blood   - Discard   - Obtain specimen   - Change injection cap   - Flush with 20 mL Normal Saline followed by a                 3-5 mL Heparin flush (10 units/mL)  Central :   - Sterile dressing changes are done weekly and as needed.   - Use chlor-hexadine scrub to cleanse site, apply Biopatch to insertion site,       apply securement device dressing   - Injection caps are changed weekly and after EVERY lab draw.   - If sterile gauze is under dressing to control  oozing,                 dressing change must be performed every 24 hours until gauze is not needed.    Medications: Review discharge medications with patient and family and provide educatio    WOUND CARE ORDERS  yes:  Surgical Wound:  Location: left foot santyl daily     Consult ET nurse        Apply the following to wound:   Collagenase (Santyl) daily, cover with telfa, wrap with with kerlix dressing      Medications: Review discharge medications with patient and family and provide education.      Current Discharge Medication List      START taking these medications    Details   sodium chloride 0.9% SolP 100 mL with meropenem 1 gram SolR 2 g Inject 2 g into the vein every 8 (eight) hours.  Qty: 126 each, Refills: 0   Last dose given 12/11/18 : 6pm         Home dose due: 12/12/18 2 am        CONTINUE these medications which have NOT CHANGED    Details   allopurinol (ZYLOPRIM) 100 MG tablet Take 2 tablets (200 mg total) by mouth once daily.  Qty: 180 tablet, Refills: 3    Associated Diagnoses: Tophaceous gout      amLODIPine (NORVASC) 5 MG tablet TAKE 1 TABLET(5 MG) BY MOUTH EVERY DAY  Qty: 30 tablet, Refills: 0    Associated Diagnoses: Essential hypertension      aspirin 81 MG Chew Take 81 mg by mouth once daily.      carvedilol (COREG) 6.25 MG tablet Take 1 tablet (6.25 mg total) by mouth 2 (two) times daily.  Qty: 60 tablet, Refills: 11      coenzyme Q10 100 mg capsule Take 100 mg by mouth every morning.      collagenase (SANTYL) ointment Apply topically once daily.  Qty: 14 g, Refills: 0    Comments: Please for one month      dorzolamide-timolol 2-0.5% (COSOPT) 22.3-6.8 mg/mL ophthalmic solution Place 1 drop into both eyes 3 (three) times daily.  Qty: 10 mL, Refills: 12    Associated Diagnoses: Neovascular glaucoma of left eye, severe stage; Primary open angle glaucoma of right eye, moderate stage      ezetimibe (ZETIA) 10 mg tablet Take 1 tablet (10 mg total) by mouth once daily.  Qty: 30 tablet, Refills: 2     "Associated Diagnoses: Hyperlipidemia LDL goal <70      fish oil-omega-3 fatty acids 300-1,000 mg capsule Take 2 capsules (2 g total) by mouth 2 (two) times daily.  Qty: 120 capsule, Refills: 5    Associated Diagnoses: HLD (hyperlipidemia)      insulin glargine-lixisenatide (SOLIQUA 100/33) 100 unit-33 mcg/mL InPn Inject 34 units once daily  Qty: 5 Syringe, Refills: 2      ketorolac 0.5% (ACULAR) 0.5 % Drop Place 1 drop into the right eye 3 (three) times daily.  Qty: 1 Bottle, Refills: 2    Associated Diagnoses: Postoperative care for cataract      MULTIVIT,THER IRON,CA,FA & MIN (MULTIVITAMIN) Tab Take 1 tablet by mouth every morning.       oxyCODONE-acetaminophen (PERCOCET) 5-325 mg per tablet Take 1 tablet by mouth every 6 (six) hours as needed for Pain.  Qty: 30 tablet, Refills: 0      pen needle, diabetic 31 gauge x 1/4" Ndle Use as directed  Qty: 100 each, Refills: 11    Associated Diagnoses: DM type 2 with diabetic peripheral neuropathy         STOP taking these medications       benazepril (LOTENSIN) 40 MG tablet Comments:   Reason for Stopping:         brimonidine 0.1% (ALPHAGAN P) 0.1 % Drop Comments:   Reason for Stopping:         furosemide (LASIX) 40 MG tablet Comments:   Reason for Stopping:               I certify that this patient is confined to his home and needs intermittent skilled nursing care.        "

## 2022-01-01 ENCOUNTER — TELEPHONE (OUTPATIENT)
Dept: ENDOCRINOLOGY | Facility: CLINIC | Age: 62
End: 2022-01-01
Payer: MEDICARE

## 2022-01-01 ENCOUNTER — HOSPITAL ENCOUNTER (OUTPATIENT)
Dept: INTERVENTIONAL RADIOLOGY/VASCULAR | Facility: HOSPITAL | Age: 62
Discharge: HOME | End: 2022-02-10
Attending: FAMILY MEDICINE
Payer: MEDICARE

## 2022-01-01 ENCOUNTER — ANESTHESIA EVENT (OUTPATIENT)
Dept: SURGERY | Facility: HOSPITAL | Age: 62
DRG: 480 | End: 2022-01-01
Payer: MEDICARE

## 2022-01-01 ENCOUNTER — HOSPITAL ENCOUNTER (OUTPATIENT)
Dept: INTERVENTIONAL RADIOLOGY/VASCULAR | Facility: HOSPITAL | Age: 62
Discharge: HOME | End: 2022-03-24
Attending: FAMILY MEDICINE
Payer: MEDICARE

## 2022-01-01 ENCOUNTER — PES CALL (OUTPATIENT)
Dept: ADMINISTRATIVE | Facility: CLINIC | Age: 62
End: 2022-01-01
Payer: MEDICARE

## 2022-01-01 ENCOUNTER — HOSPITAL ENCOUNTER (OUTPATIENT)
Dept: WOUND CARE | Facility: HOSPITAL | Age: 62
Discharge: HOME | End: 2022-03-08
Attending: FAMILY MEDICINE
Payer: MEDICARE

## 2022-01-01 ENCOUNTER — HOSPITAL ENCOUNTER (OUTPATIENT)
Dept: INTERVENTIONAL RADIOLOGY/VASCULAR | Facility: HOSPITAL | Age: 62
Discharge: HOME | End: 2022-03-10
Attending: FAMILY MEDICINE
Payer: MEDICARE

## 2022-01-01 ENCOUNTER — HOSPITAL ENCOUNTER (OUTPATIENT)
Dept: INTERVENTIONAL RADIOLOGY/VASCULAR | Facility: HOSPITAL | Age: 62
Discharge: HOME | End: 2022-03-31
Attending: FAMILY MEDICINE
Payer: MEDICARE

## 2022-01-01 ENCOUNTER — HOSPITAL ENCOUNTER (OUTPATIENT)
Dept: WOUND CARE | Facility: HOSPITAL | Age: 62
Discharge: HOME | End: 2022-02-15
Attending: FAMILY MEDICINE
Payer: MEDICARE

## 2022-01-01 ENCOUNTER — HOSPITAL ENCOUNTER (OUTPATIENT)
Dept: INTERVENTIONAL RADIOLOGY/VASCULAR | Facility: HOSPITAL | Age: 62
Discharge: HOME | End: 2022-03-17
Attending: FAMILY MEDICINE
Payer: MEDICARE

## 2022-01-01 ENCOUNTER — LAB VISIT (OUTPATIENT)
Dept: LAB | Facility: HOSPITAL | Age: 62
End: 2022-01-01
Attending: NURSE PRACTITIONER
Payer: MEDICARE

## 2022-01-01 ENCOUNTER — HOSPITAL ENCOUNTER (OUTPATIENT)
Dept: WOUND CARE | Facility: HOSPITAL | Age: 62
Discharge: HOME | End: 2022-01-25
Attending: FAMILY MEDICINE
Payer: MEDICARE

## 2022-01-01 ENCOUNTER — ANESTHESIA (OUTPATIENT)
Dept: SURGERY | Facility: HOSPITAL | Age: 62
DRG: 480 | End: 2022-01-01
Payer: MEDICARE

## 2022-01-01 ENCOUNTER — HOSPITAL ENCOUNTER (OUTPATIENT)
Dept: INTERVENTIONAL RADIOLOGY/VASCULAR | Facility: HOSPITAL | Age: 62
Discharge: HOME | End: 2022-02-24
Attending: FAMILY MEDICINE
Payer: MEDICARE

## 2022-01-01 ENCOUNTER — HOSPITAL ENCOUNTER (OUTPATIENT)
Dept: INTERVENTIONAL RADIOLOGY/VASCULAR | Facility: HOSPITAL | Age: 62
Discharge: HOME | End: 2022-03-03
Attending: FAMILY MEDICINE
Payer: MEDICARE

## 2022-01-01 ENCOUNTER — HOSPITAL ENCOUNTER (OUTPATIENT)
Dept: INTERVENTIONAL RADIOLOGY/VASCULAR | Facility: HOSPITAL | Age: 62
Discharge: HOME | End: 2022-01-06
Attending: FAMILY MEDICINE | Admitting: RADIOLOGY
Payer: MEDICARE

## 2022-01-01 ENCOUNTER — HOSPITAL ENCOUNTER (OUTPATIENT)
Dept: INTERVENTIONAL RADIOLOGY/VASCULAR | Facility: HOSPITAL | Age: 62
Discharge: HOME | End: 2022-01-27
Attending: FAMILY MEDICINE
Payer: MEDICARE

## 2022-01-01 ENCOUNTER — OFFICE VISIT (OUTPATIENT)
Dept: ENDOCRINOLOGY | Facility: CLINIC | Age: 62
End: 2022-01-01
Payer: MEDICARE

## 2022-01-01 ENCOUNTER — HOSPITAL ENCOUNTER (OUTPATIENT)
Dept: WOUND CARE | Facility: HOSPITAL | Age: 62
Discharge: HOME | End: 2022-01-11
Attending: FAMILY MEDICINE
Payer: MEDICARE

## 2022-01-01 ENCOUNTER — HOSPITAL ENCOUNTER (OUTPATIENT)
Dept: INTERVENTIONAL RADIOLOGY/VASCULAR | Facility: HOSPITAL | Age: 62
Discharge: HOME | End: 2022-02-03
Attending: FAMILY MEDICINE
Payer: MEDICARE

## 2022-01-01 ENCOUNTER — HOSPITAL ENCOUNTER (OUTPATIENT)
Dept: INTERVENTIONAL RADIOLOGY/VASCULAR | Facility: HOSPITAL | Age: 62
Discharge: HOME | End: 2022-01-20
Attending: FAMILY MEDICINE
Payer: MEDICARE

## 2022-01-01 ENCOUNTER — HOSPITAL ENCOUNTER (OUTPATIENT)
Dept: INTERVENTIONAL RADIOLOGY/VASCULAR | Facility: HOSPITAL | Age: 62
Discharge: HOME | End: 2022-01-13
Attending: FAMILY MEDICINE
Payer: MEDICARE

## 2022-01-01 ENCOUNTER — HOSPITAL ENCOUNTER (OUTPATIENT)
Dept: WOUND CARE | Facility: HOSPITAL | Age: 62
Discharge: HOME | End: 2022-03-29
Attending: FAMILY MEDICINE
Payer: MEDICARE

## 2022-01-01 ENCOUNTER — HOSPITAL ENCOUNTER (INPATIENT)
Facility: HOSPITAL | Age: 62
LOS: 1 days | DRG: 480 | End: 2022-04-05
Attending: EMERGENCY MEDICINE | Admitting: INTERNAL MEDICINE
Payer: MEDICARE

## 2022-01-01 ENCOUNTER — HOSPITAL ENCOUNTER (OUTPATIENT)
Dept: INTERVENTIONAL RADIOLOGY/VASCULAR | Facility: HOSPITAL | Age: 62
Discharge: HOME | End: 2022-02-17
Attending: FAMILY MEDICINE
Payer: MEDICARE

## 2022-01-01 VITALS
SYSTOLIC BLOOD PRESSURE: 132 MMHG | RESPIRATION RATE: 16 BRPM | DIASTOLIC BLOOD PRESSURE: 58 MMHG | DIASTOLIC BLOOD PRESSURE: 77 MMHG | SYSTOLIC BLOOD PRESSURE: 119 MMHG | DIASTOLIC BLOOD PRESSURE: 76 MMHG | DIASTOLIC BLOOD PRESSURE: 72 MMHG | DIASTOLIC BLOOD PRESSURE: 73 MMHG | TEMPERATURE: 97 F | SYSTOLIC BLOOD PRESSURE: 108 MMHG | SYSTOLIC BLOOD PRESSURE: 128 MMHG | SYSTOLIC BLOOD PRESSURE: 115 MMHG | HEART RATE: 79 BPM

## 2022-01-01 VITALS
HEART RATE: 89 BPM | DIASTOLIC BLOOD PRESSURE: 75 MMHG | SYSTOLIC BLOOD PRESSURE: 129 MMHG | SYSTOLIC BLOOD PRESSURE: 125 MMHG | DIASTOLIC BLOOD PRESSURE: 74 MMHG | RESPIRATION RATE: 16 BRPM | SYSTOLIC BLOOD PRESSURE: 135 MMHG | DIASTOLIC BLOOD PRESSURE: 71 MMHG | DIASTOLIC BLOOD PRESSURE: 64 MMHG | SYSTOLIC BLOOD PRESSURE: 130 MMHG | TEMPERATURE: 98 F

## 2022-01-01 VITALS — HEART RATE: 89 BPM | SYSTOLIC BLOOD PRESSURE: 128 MMHG | DIASTOLIC BLOOD PRESSURE: 78 MMHG | TEMPERATURE: 99 F

## 2022-01-01 VITALS
DIASTOLIC BLOOD PRESSURE: 77 MMHG | SYSTOLIC BLOOD PRESSURE: 103 MMHG | DIASTOLIC BLOOD PRESSURE: 79 MMHG | SYSTOLIC BLOOD PRESSURE: 126 MMHG | SYSTOLIC BLOOD PRESSURE: 149 MMHG | SYSTOLIC BLOOD PRESSURE: 136 MMHG | DIASTOLIC BLOOD PRESSURE: 73 MMHG | HEART RATE: 83 BPM | DIASTOLIC BLOOD PRESSURE: 78 MMHG | DIASTOLIC BLOOD PRESSURE: 61 MMHG | SYSTOLIC BLOOD PRESSURE: 112 MMHG | HEART RATE: 81 BPM | DIASTOLIC BLOOD PRESSURE: 65 MMHG | OXYGEN SATURATION: 96 % | TEMPERATURE: 97 F | TEMPERATURE: 97 F | SYSTOLIC BLOOD PRESSURE: 134 MMHG

## 2022-01-01 VITALS
TEMPERATURE: 98 F | DIASTOLIC BLOOD PRESSURE: 58 MMHG | WEIGHT: 186 LBS | OXYGEN SATURATION: 88 % | HEIGHT: 70 IN | BODY MASS INDEX: 26.63 KG/M2 | RESPIRATION RATE: 15 BRPM | SYSTOLIC BLOOD PRESSURE: 108 MMHG | HEART RATE: 90 BPM

## 2022-01-01 VITALS
RESPIRATION RATE: 20 BRPM | HEART RATE: 83 BPM | DIASTOLIC BLOOD PRESSURE: 60 MMHG | SYSTOLIC BLOOD PRESSURE: 114 MMHG | TEMPERATURE: 98 F

## 2022-01-01 VITALS — DIASTOLIC BLOOD PRESSURE: 68 MMHG | SYSTOLIC BLOOD PRESSURE: 113 MMHG

## 2022-01-01 VITALS — SYSTOLIC BLOOD PRESSURE: 122 MMHG | DIASTOLIC BLOOD PRESSURE: 64 MMHG

## 2022-01-01 DIAGNOSIS — E11.42 DM TYPE 2 WITH DIABETIC PERIPHERAL NEUROPATHY: ICD-10-CM

## 2022-01-01 DIAGNOSIS — R20.2 PARESTHESIA: ICD-10-CM

## 2022-01-01 DIAGNOSIS — I73.9 PVD (PERIPHERAL VASCULAR DISEASE): Chronic | ICD-10-CM

## 2022-01-01 DIAGNOSIS — I50.43 ACUTE ON CHRONIC COMBINED SYSTOLIC AND DIASTOLIC CONGESTIVE HEART FAILURE: ICD-10-CM

## 2022-01-01 DIAGNOSIS — S91.302A WOUND OF LEFT FOOT: ICD-10-CM

## 2022-01-01 DIAGNOSIS — R18.8 OTHER ASCITES: ICD-10-CM

## 2022-01-01 DIAGNOSIS — I25.10 CORONARY ARTERY DISEASE INVOLVING NATIVE CORONARY ARTERY OF NATIVE HEART WITHOUT ANGINA PECTORIS: ICD-10-CM

## 2022-01-01 DIAGNOSIS — S91.302A WOUND OF LEFT FOOT: Primary | ICD-10-CM

## 2022-01-01 DIAGNOSIS — Z51.5 PALLIATIVE CARE ENCOUNTER: ICD-10-CM
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
## 2022-01-01 DIAGNOSIS — I25.5 ISCHEMIC CARDIOMYOPATHY: ICD-10-CM

## 2022-01-01 DIAGNOSIS — L97.529 TYPE 2 DIABETES MELLITUS WITH LEFT DIABETIC FOOT ULCER: Primary | ICD-10-CM

## 2022-01-01 DIAGNOSIS — L97.919 DIABETIC ULCER OF RIGHT LOWER LEG: Primary | ICD-10-CM

## 2022-01-01 DIAGNOSIS — E78.5 HYPERLIPIDEMIA LDL GOAL <70: ICD-10-CM

## 2022-01-01 DIAGNOSIS — E11.42 DM TYPE 2 WITH DIABETIC PERIPHERAL NEUROPATHY: Chronic | ICD-10-CM

## 2022-01-01 DIAGNOSIS — E11.621 TYPE 2 DIABETES MELLITUS WITH LEFT DIABETIC FOOT ULCER: Primary | ICD-10-CM

## 2022-01-01 DIAGNOSIS — E11.622 DIABETIC ULCER OF RIGHT LOWER LEG: Primary | ICD-10-CM

## 2022-01-01 DIAGNOSIS — L97.529 TYPE 2 DIABETES MELLITUS WITH LEFT DIABETIC FOOT ULCER: ICD-10-CM

## 2022-01-01 DIAGNOSIS — I10 ESSENTIAL HYPERTENSION: ICD-10-CM

## 2022-01-01 DIAGNOSIS — Z71.89 ADVANCED CARE PLANNING/COUNSELING DISCUSSION: ICD-10-CM

## 2022-01-01 DIAGNOSIS — E78.5 HYPERLIPIDEMIA, UNSPECIFIED HYPERLIPIDEMIA TYPE: ICD-10-CM

## 2022-01-01 DIAGNOSIS — E11.621 TYPE 2 DIABETES MELLITUS WITH LEFT DIABETIC FOOT ULCER: ICD-10-CM

## 2022-01-01 DIAGNOSIS — I70.234 ATHEROSCLEROSIS OF NATIVE ARTERY OF RIGHT LOWER EXTREMITY WITH ULCERATION OF HEEL: Primary | ICD-10-CM

## 2022-01-01 DIAGNOSIS — I10 ESSENTIAL HYPERTENSION: Chronic | ICD-10-CM

## 2022-01-01 DIAGNOSIS — S72.002A CLOSED LEFT HIP FRACTURE: ICD-10-CM

## 2022-01-01 DIAGNOSIS — I50.9 CHF (CONGESTIVE HEART FAILURE): ICD-10-CM

## 2022-01-01 DIAGNOSIS — I50.9 CONGESTIVE HEART FAILURE: ICD-10-CM

## 2022-01-01 DIAGNOSIS — M25.552 LEFT HIP PAIN: ICD-10-CM

## 2022-01-01 DIAGNOSIS — E11.42 DM TYPE 2 WITH DIABETIC PERIPHERAL NEUROPATHY: Primary | ICD-10-CM

## 2022-01-01 DIAGNOSIS — R18.8 OTHER ASCITES: Primary | ICD-10-CM

## 2022-01-01 LAB
ABO + RH BLD: NORMAL
ALBUMIN SERPL BCP-MCNC: 2.4 G/DL (ref 3.5–5.2)
ALLENS TEST: ABNORMAL
ALLENS TEST: ABNORMAL
ALP SERPL-CCNC: 87 U/L (ref 55–135)
ALT SERPL W/O P-5'-P-CCNC: 24 U/L (ref 10–44)
ANION GAP SERPL CALC-SCNC: 11 MMOL/L (ref 8–16)
ANION GAP SERPL CALC-SCNC: 12 MMOL/L (ref 8–16)
ANION GAP SERPL CALC-SCNC: 8 MMOL/L (ref 8–16)
AORTIC ROOT ANNULUS: 3.46 CM
AORTIC VALVE CUSP SEPERATION: 1.62 CM
ASCENDING AORTA: 3.18 CM
AST SERPL-CCNC: 22 U/L (ref 10–40)
AV INDEX (PROSTH): 0.64
AV MEAN GRADIENT: 2 MMHG
AV PEAK GRADIENT: 3 MMHG
AV VALVE AREA: 2.19 CM2
AV VELOCITY RATIO: 0.63
BASOPHILS # BLD AUTO: 0.07 K/UL (ref 0–0.2)
BASOPHILS # BLD AUTO: 0.07 K/UL (ref 0–0.2)
BASOPHILS NFR BLD: 0.8 % (ref 0–1.9)
BASOPHILS NFR BLD: 0.8 % (ref 0–1.9)
BILIRUB SERPL-MCNC: 0.4 MG/DL (ref 0.1–1)
BLD GP AB SCN CELLS X3 SERPL QL: NORMAL
BNP SERPL-MCNC: 1367 PG/ML (ref 0–99)
BSA FOR ECHO PROCEDURE: 2.04 M2
BUN SERPL-MCNC: 78 MG/DL (ref 8–23)
BUN SERPL-MCNC: 79 MG/DL (ref 8–23)
BUN SERPL-MCNC: 79 MG/DL (ref 8–23)
CALCIUM SERPL-MCNC: 7.7 MG/DL (ref 8.7–10.5)
CALCIUM SERPL-MCNC: 7.8 MG/DL (ref 8.7–10.5)
CALCIUM SERPL-MCNC: 7.9 MG/DL (ref 8.7–10.5)
CHLORIDE SERPL-SCNC: 93 MMOL/L (ref 95–110)
CHLORIDE SERPL-SCNC: 93 MMOL/L (ref 95–110)
CHLORIDE SERPL-SCNC: 94 MMOL/L (ref 95–110)
CO2 SERPL-SCNC: 28 MMOL/L (ref 23–29)
CO2 SERPL-SCNC: 28 MMOL/L (ref 23–29)
CO2 SERPL-SCNC: 29 MMOL/L (ref 23–29)
CREAT SERPL-MCNC: 2.2 MG/DL (ref 0.5–1.4)
CREAT SERPL-MCNC: 2.3 MG/DL (ref 0.5–1.4)
CREAT SERPL-MCNC: 2.4 MG/DL (ref 0.5–1.4)
CTP QC/QA: YES
CV ECHO LV RWT: 0.42 CM
DELSYS: ABNORMAL
DIFFERENTIAL METHOD: ABNORMAL
DIFFERENTIAL METHOD: ABNORMAL
DOP CALC AO PEAK VEL: 0.88 M/S
DOP CALC AO VTI: 15.7 CM
DOP CALC LVOT AREA: 3.4 CM2
DOP CALC LVOT DIAMETER: 2.09 CM
DOP CALC LVOT PEAK VEL: 0.55 M/S
DOP CALC LVOT STROKE VOLUME: 34.43 CM3
DOP CALCLVOT PEAK VEL VTI: 10.04 CM
E WAVE DECELERATION TIME: 102.73 MSEC
E/A RATIO: 3.48
E/E' RATIO: 12.17 M/S
ECHO LV POSTERIOR WALL: 1.09 CM (ref 0.6–1.1)
EJECTION FRACTION: 15 %
EOSINOPHIL # BLD AUTO: 0.3 K/UL (ref 0–0.5)
EOSINOPHIL # BLD AUTO: 0.4 K/UL (ref 0–0.5)
EOSINOPHIL NFR BLD: 3.1 % (ref 0–8)
EOSINOPHIL NFR BLD: 4.8 % (ref 0–8)
ERYTHROCYTE [DISTWIDTH] IN BLOOD BY AUTOMATED COUNT: 17.2 % (ref 11.5–14.5)
ERYTHROCYTE [DISTWIDTH] IN BLOOD BY AUTOMATED COUNT: 17.3 % (ref 11.5–14.5)
ERYTHROCYTE [SEDIMENTATION RATE] IN BLOOD BY WESTERGREN METHOD: 32 MM/H
EST. GFR  (AFRICAN AMERICAN): 32 ML/MIN/1.73 M^2
EST. GFR  (AFRICAN AMERICAN): 33 ML/MIN/1.73 M^2
EST. GFR  (AFRICAN AMERICAN): 35 ML/MIN/1.73 M^2
EST. GFR  (NON AFRICAN AMERICAN): 27 ML/MIN/1.73 M^2
EST. GFR  (NON AFRICAN AMERICAN): 29 ML/MIN/1.73 M^2
EST. GFR  (NON AFRICAN AMERICAN): 31 ML/MIN/1.73 M^2
ESTIMATED AVG GLUCOSE: 203 MG/DL (ref 68–131)
FIO2: 100
FRACTIONAL SHORTENING: 6 % (ref 28–44)
GLUCOSE SERPL-MCNC: 223 MG/DL (ref 70–110)
GLUCOSE SERPL-MCNC: 251 MG/DL (ref 70–110)
GLUCOSE SERPL-MCNC: 306 MG/DL (ref 70–110)
HBA1C MFR BLD: 8.7 % (ref 4–5.6)
HCO3 UR-SCNC: 25.6 MMOL/L (ref 24–28)
HCO3 UR-SCNC: 29.4 MMOL/L (ref 24–28)
HCT VFR BLD AUTO: 39.6 % (ref 40–54)
HCT VFR BLD AUTO: 41.2 % (ref 40–54)
HGB BLD-MCNC: 12.7 G/DL (ref 14–18)
HGB BLD-MCNC: 13.6 G/DL (ref 14–18)
IMM GRANULOCYTES # BLD AUTO: 0.04 K/UL (ref 0–0.04)
IMM GRANULOCYTES # BLD AUTO: 0.06 K/UL (ref 0–0.04)
IMM GRANULOCYTES NFR BLD AUTO: 0.5 % (ref 0–0.5)
IMM GRANULOCYTES NFR BLD AUTO: 0.7 % (ref 0–0.5)
INR PPP: 1 (ref 0.8–1.2)
INTERVENTRICULAR SEPTUM: 1.14 CM (ref 0.6–1.1)
IVRT: 140.14 MSEC
LA MAJOR: 6.37 CM
LA MINOR: 5.88 CM
LA WIDTH: 5.35 CM
LEFT ATRIUM SIZE: 4.68 CM
LEFT ATRIUM VOLUME INDEX: 64.4 ML/M2
LEFT ATRIUM VOLUME: 130.15 CM3
LEFT INTERNAL DIMENSION IN SYSTOLE: 4.91 CM (ref 2.1–4)
LEFT VENTRICLE DIASTOLIC VOLUME INDEX: 65.01 ML/M2
LEFT VENTRICLE DIASTOLIC VOLUME: 131.33 ML
LEFT VENTRICLE MASS INDEX: 112 G/M2
LEFT VENTRICLE SYSTOLIC VOLUME INDEX: 56.2 ML/M2
LEFT VENTRICLE SYSTOLIC VOLUME: 113.46 ML
LEFT VENTRICULAR INTERNAL DIMENSION IN DIASTOLE: 5.23 CM (ref 3.5–6)
LEFT VENTRICULAR MASS: 226.99 G
LV LATERAL E/E' RATIO: 9.13 M/S
LV SEPTAL E/E' RATIO: 18.25 M/S
LYMPHOCYTES # BLD AUTO: 0.2 K/UL (ref 1–4.8)
LYMPHOCYTES # BLD AUTO: 0.4 K/UL (ref 1–4.8)
LYMPHOCYTES NFR BLD: 2.5 % (ref 18–48)
LYMPHOCYTES NFR BLD: 4.1 % (ref 18–48)
MAGNESIUM SERPL-MCNC: 2.4 MG/DL (ref 1.6–2.6)
MCH RBC QN AUTO: 32.2 PG (ref 27–31)
MCH RBC QN AUTO: 32.4 PG (ref 27–31)
MCHC RBC AUTO-ENTMCNC: 32.1 G/DL (ref 32–36)
MCHC RBC AUTO-ENTMCNC: 33 G/DL (ref 32–36)
MCV RBC AUTO: 101 FL (ref 82–98)
MCV RBC AUTO: 98 FL (ref 82–98)
MIN VOL: 13.9
MODE: ABNORMAL
MONOCYTES # BLD AUTO: 0.6 K/UL (ref 0.3–1)
MONOCYTES # BLD AUTO: 1 K/UL (ref 0.3–1)
MONOCYTES NFR BLD: 11.6 % (ref 4–15)
MONOCYTES NFR BLD: 6.6 % (ref 4–15)
MV PEAK A VEL: 0.21 M/S
MV PEAK E VEL: 0.73 M/S
MV STENOSIS PRESSURE HALF TIME: 29.79 MS
MV VALVE AREA P 1/2 METHOD: 7.39 CM2
NEUTROPHILS # BLD AUTO: 6.7 K/UL (ref 1.8–7.7)
NEUTROPHILS # BLD AUTO: 7.1 K/UL (ref 1.8–7.7)
NEUTROPHILS NFR BLD: 79.7 % (ref 38–73)
NEUTROPHILS NFR BLD: 84.8 % (ref 38–73)
NRBC BLD-RTO: 0 /100 WBC
NRBC BLD-RTO: 0 /100 WBC
PCO2 BLDA: 58.9 MMHG (ref 35–45)
PCO2 BLDA: 64.1 MMHG (ref 35–45)
PEEP: 18
PH SMN: 7.21 [PH] (ref 7.35–7.45)
PH SMN: 7.31 [PH] (ref 7.35–7.45)
PIP: 39
PISA TR MAX VEL: 3.3 M/S
PLATELET # BLD AUTO: 198 K/UL (ref 150–450)
PLATELET # BLD AUTO: 217 K/UL (ref 150–450)
PMV BLD AUTO: 10.3 FL (ref 9.2–12.9)
PMV BLD AUTO: 9.9 FL (ref 9.2–12.9)
PO2 BLDA: 39 MMHG (ref 80–100)
PO2 BLDA: 50 MMHG (ref 80–100)
POC BE: -4 MMOL/L
POC BE: 1 MMOL/L
POC SATURATED O2: 67 % (ref 95–100)
POC SATURATED O2: 76 % (ref 95–100)
POC TCO2: 27 MMOL/L (ref 23–27)
POC TCO2: 31 MMOL/L (ref 23–27)
POCT GLUCOSE: 228 MG/DL (ref 70–110)
POCT GLUCOSE: 293 MG/DL (ref 70–110)
POTASSIUM SERPL-SCNC: 2.9 MMOL/L (ref 3.5–5.1)
POTASSIUM SERPL-SCNC: 3.4 MMOL/L (ref 3.5–5.1)
POTASSIUM SERPL-SCNC: 3.5 MMOL/L (ref 3.5–5.1)
PROT SERPL-MCNC: 5.5 G/DL (ref 6–8.4)
PROTHROMBIN TIME: 10.5 SEC (ref 9–12.5)
PV PEAK VELOCITY: 0.48 CM/S
RA MAJOR: 6.42 CM
RA PRESSURE: 15 MMHG
RA WIDTH: 5.08 CM
RBC # BLD AUTO: 3.92 M/UL (ref 4.6–6.2)
RBC # BLD AUTO: 4.22 M/UL (ref 4.6–6.2)
RIGHT VENTRICULAR END-DIASTOLIC DIMENSION: 5.65 CM
RV TISSUE DOPPLER FREE WALL SYSTOLIC VELOCITY 1 (APICAL 4 CHAMBER VIEW): 5.15 CM/S
SAMPLE: ABNORMAL
SAMPLE: ABNORMAL
SARS-COV-2 RDRP RESP QL NAA+PROBE: NEGATIVE
SINUS: 3.27 CM
SITE: ABNORMAL
SITE: ABNORMAL
SODIUM SERPL-SCNC: 130 MMOL/L (ref 136–145)
SODIUM SERPL-SCNC: 132 MMOL/L (ref 136–145)
SODIUM SERPL-SCNC: 134 MMOL/L (ref 136–145)
STJ: 2.53 CM
TDI LATERAL: 0.08 M/S
TDI SEPTAL: 0.04 M/S
TDI: 0.06 M/S
TR MAX PG: 44 MMHG
TRICUSPID ANNULAR PLANE SYSTOLIC EXCURSION: 1.47 CM
TV REST PULMONARY ARTERY PRESSURE: 59 MMHG
VT: 450
WBC # BLD AUTO: 8.34 K/UL (ref 3.9–12.7)
WBC # BLD AUTO: 8.45 K/UL (ref 3.9–12.7)

## 2022-01-01 PROCEDURE — 36415 COLL VENOUS BLD VENIPUNCTURE: CPT | Mod: PO | Performed by: NURSE PRACTITIONER

## 2022-01-01 PROCEDURE — 49083 IR PARACENTESIS WITH IMAGING: ICD-10-PCS | Mod: ,,, | Performed by: RADIOLOGY

## 2022-01-01 PROCEDURE — 25000003 PHARM REV CODE 250: Performed by: NURSE ANESTHETIST, CERTIFIED REGISTERED

## 2022-01-01 PROCEDURE — 80053 COMPREHEN METABOLIC PANEL: CPT | Performed by: EMERGENCY MEDICINE

## 2022-01-01 PROCEDURE — 99214 OFFICE O/P EST MOD 30 MIN: CPT | Performed by: FAMILY MEDICINE

## 2022-01-01 PROCEDURE — 80048 BASIC METABOLIC PNL TOTAL CA: CPT | Mod: 91 | Performed by: INTERNAL MEDICINE

## 2022-01-01 PROCEDURE — 99214 PR OFFICE/OUTPT VISIT, EST, LEVL IV, 30-39 MIN: ICD-10-PCS | Mod: ,,, | Performed by: FAMILY MEDICINE

## 2022-01-01 PROCEDURE — C1729 CATH, DRAINAGE: HCPCS

## 2022-01-01 PROCEDURE — 85025 COMPLETE CBC W/AUTO DIFF WBC: CPT | Performed by: INTERNAL MEDICINE

## 2022-01-01 PROCEDURE — 86901 BLOOD TYPING SEROLOGIC RH(D): CPT | Performed by: ANESTHESIOLOGY

## 2022-01-01 PROCEDURE — 49083 ABD PARACENTESIS W/IMAGING: CPT | Performed by: RADIOLOGY

## 2022-01-01 PROCEDURE — 99223 PR INITIAL HOSPITAL CARE,LEVL III: ICD-10-PCS | Mod: ,,, | Performed by: INTERNAL MEDICINE

## 2022-01-01 PROCEDURE — 25000003 PHARM REV CODE 250: Performed by: INTERNAL MEDICINE

## 2022-01-01 PROCEDURE — 21400001 HC TELEMETRY ROOM

## 2022-01-01 PROCEDURE — 99214 OFFICE O/P EST MOD 30 MIN: CPT | Mod: PBBFAC | Performed by: NURSE PRACTITIONER

## 2022-01-01 PROCEDURE — 36000711: Performed by: ORTHOPAEDIC SURGERY

## 2022-01-01 PROCEDURE — 86920 COMPATIBILITY TEST SPIN: CPT | Performed by: ANESTHESIOLOGY

## 2022-01-01 PROCEDURE — 93005 ELECTROCARDIOGRAM TRACING: CPT

## 2022-01-01 PROCEDURE — 25000003 PHARM REV CODE 250: Performed by: RADIOLOGY

## 2022-01-01 PROCEDURE — 99214 OFFICE O/P EST MOD 30 MIN: CPT | Mod: ,,, | Performed by: FAMILY MEDICINE

## 2022-01-01 PROCEDURE — 99213 OFFICE O/P EST LOW 20 MIN: CPT | Performed by: FAMILY MEDICINE

## 2022-01-01 PROCEDURE — C1751 CATH, INF, PER/CENT/MIDLINE: HCPCS | Performed by: ANESTHESIOLOGY

## 2022-01-01 PROCEDURE — 63600175 PHARM REV CODE 636 W HCPCS: Performed by: NURSE ANESTHETIST, CERTIFIED REGISTERED

## 2022-01-01 PROCEDURE — 36620 PR INSERT CATH,ART,PERCUT,SHORTTERM: ICD-10-PCS | Mod: 59,,, | Performed by: ANESTHESIOLOGY

## 2022-01-01 PROCEDURE — C1713 ANCHOR/SCREW BN/BN,TIS/BN: HCPCS | Performed by: ORTHOPAEDIC SURGERY

## 2022-01-01 PROCEDURE — 25000003 PHARM REV CODE 250: Performed by: ANESTHESIOLOGY

## 2022-01-01 PROCEDURE — 99499 NO LOS: ICD-10-PCS | Mod: ,,, | Performed by: NURSE PRACTITIONER

## 2022-01-01 PROCEDURE — 36620 INSERTION CATHETER ARTERY: CPT | Mod: 59,,, | Performed by: ANESTHESIOLOGY

## 2022-01-01 PROCEDURE — 76937 US GUIDE VASCULAR ACCESS: CPT | Performed by: ANESTHESIOLOGY

## 2022-01-01 PROCEDURE — 36415 COLL VENOUS BLD VENIPUNCTURE: CPT | Performed by: INTERNAL MEDICINE

## 2022-01-01 PROCEDURE — 37000009 HC ANESTHESIA EA ADD 15 MINS: Performed by: ORTHOPAEDIC SURGERY

## 2022-01-01 PROCEDURE — 37000008 HC ANESTHESIA 1ST 15 MINUTES: Performed by: ORTHOPAEDIC SURGERY

## 2022-01-01 PROCEDURE — D9220A PRA ANESTHESIA: ICD-10-PCS | Mod: ANES,,, | Performed by: ANESTHESIOLOGY

## 2022-01-01 PROCEDURE — 99999 PR PBB SHADOW E&M-EST. PATIENT-LVL IV: ICD-10-PCS | Mod: PBBFAC,,, | Performed by: NURSE PRACTITIONER

## 2022-01-01 PROCEDURE — 63600175 PHARM REV CODE 636 W HCPCS: Performed by: ANESTHESIOLOGY

## 2022-01-01 PROCEDURE — 76937 PR  US GUIDE, VASCULAR ACCESS: ICD-10-PCS | Mod: 26,,, | Performed by: ANESTHESIOLOGY

## 2022-01-01 PROCEDURE — 99214 OFFICE O/P EST MOD 30 MIN: CPT

## 2022-01-01 PROCEDURE — 76937 US GUIDE VASCULAR ACCESS: CPT | Mod: 26,,, | Performed by: ANESTHESIOLOGY

## 2022-01-01 PROCEDURE — 99900035 HC TECH TIME PER 15 MIN (STAT)

## 2022-01-01 PROCEDURE — C1776 JOINT DEVICE (IMPLANTABLE): HCPCS | Performed by: ORTHOPAEDIC SURGERY

## 2022-01-01 PROCEDURE — 63600175 PHARM REV CODE 636 W HCPCS: Performed by: INTERNAL MEDICINE

## 2022-01-01 PROCEDURE — 85610 PROTHROMBIN TIME: CPT | Performed by: EMERGENCY MEDICINE

## 2022-01-01 PROCEDURE — 83036 HEMOGLOBIN GLYCOSYLATED A1C: CPT | Performed by: NURSE PRACTITIONER

## 2022-01-01 PROCEDURE — 99499 UNLISTED E&M SERVICE: CPT | Mod: ,,, | Performed by: NURSE PRACTITIONER

## 2022-01-01 PROCEDURE — 82803 BLOOD GASES ANY COMBINATION: CPT

## 2022-01-01 PROCEDURE — 37799 UNLISTED PX VASCULAR SURGERY: CPT

## 2022-01-01 PROCEDURE — 36556 PR INSERT NON-TUNNEL CV CATH 5+ YRS OLD: ICD-10-PCS | Mod: 59,,, | Performed by: ANESTHESIOLOGY

## 2022-01-01 PROCEDURE — 27201423 OPTIME MED/SURG SUP & DEVICES STERILE SUPPLY: Performed by: ORTHOPAEDIC SURGERY

## 2022-01-01 PROCEDURE — 99999 PR PBB SHADOW E&M-EST. PATIENT-LVL IV: CPT | Mod: PBBFAC,,, | Performed by: NURSE PRACTITIONER

## 2022-01-01 PROCEDURE — D9220A PRA ANESTHESIA: ICD-10-PCS | Mod: CRNA,,, | Performed by: NURSE ANESTHETIST, CERTIFIED REGISTERED

## 2022-01-01 PROCEDURE — 36415 COLL VENOUS BLD VENIPUNCTURE: CPT | Performed by: ANESTHESIOLOGY

## 2022-01-01 PROCEDURE — 85025 COMPLETE CBC W/AUTO DIFF WBC: CPT | Performed by: EMERGENCY MEDICINE

## 2022-01-01 PROCEDURE — D9220A PRA ANESTHESIA: Mod: ANES,,, | Performed by: ANESTHESIOLOGY

## 2022-01-01 PROCEDURE — 25000003 PHARM REV CODE 250

## 2022-01-01 PROCEDURE — 93010 ELECTROCARDIOGRAM REPORT: CPT | Mod: ,,, | Performed by: INTERNAL MEDICINE

## 2022-01-01 PROCEDURE — 63600175 PHARM REV CODE 636 W HCPCS

## 2022-01-01 PROCEDURE — 99223 1ST HOSP IP/OBS HIGH 75: CPT | Mod: ,,, | Performed by: INTERNAL MEDICINE

## 2022-01-01 PROCEDURE — 25000003 PHARM REV CODE 250: Performed by: EMERGENCY MEDICINE

## 2022-01-01 PROCEDURE — 80048 BASIC METABOLIC PNL TOTAL CA: CPT | Performed by: INTERNAL MEDICINE

## 2022-01-01 PROCEDURE — 36000710: Performed by: ORTHOPAEDIC SURGERY

## 2022-01-01 PROCEDURE — 99285 EMERGENCY DEPT VISIT HI MDM: CPT | Mod: 25

## 2022-01-01 PROCEDURE — 83880 ASSAY OF NATRIURETIC PEPTIDE: CPT | Performed by: INTERNAL MEDICINE

## 2022-01-01 PROCEDURE — 99214 PR OFFICE/OUTPT VISIT, EST, LEVL IV, 30-39 MIN: ICD-10-PCS | Mod: S$PBB,,, | Performed by: NURSE PRACTITIONER

## 2022-01-01 PROCEDURE — 83735 ASSAY OF MAGNESIUM: CPT | Performed by: INTERNAL MEDICINE

## 2022-01-01 PROCEDURE — 36556 INSERT NON-TUNNEL CV CATH: CPT | Mod: 59,,, | Performed by: ANESTHESIOLOGY

## 2022-01-01 PROCEDURE — D9220A PRA ANESTHESIA: Mod: CRNA,,, | Performed by: NURSE ANESTHETIST, CERTIFIED REGISTERED

## 2022-01-01 PROCEDURE — 36620 INSERTION CATHETER ARTERY: CPT | Performed by: ANESTHESIOLOGY

## 2022-01-01 PROCEDURE — U0002 COVID-19 LAB TEST NON-CDC: HCPCS | Performed by: EMERGENCY MEDICINE

## 2022-01-01 PROCEDURE — 93010 EKG 12-LEAD: ICD-10-PCS | Mod: ,,, | Performed by: INTERNAL MEDICINE

## 2022-01-01 PROCEDURE — 99214 OFFICE O/P EST MOD 30 MIN: CPT | Mod: S$PBB,,, | Performed by: NURSE PRACTITIONER

## 2022-01-01 PROCEDURE — C1769 GUIDE WIRE: HCPCS | Performed by: ORTHOPAEDIC SURGERY

## 2022-01-01 DEVICE — SCREW LOCKING 5.0 X 50MM: Type: IMPLANTABLE DEVICE | Site: HIP | Status: FUNCTIONAL

## 2022-01-01 DEVICE — SCREW LOCKING T2 F/T 5X47.5MM: Type: IMPLANTABLE DEVICE | Site: HIP | Status: FUNCTIONAL

## 2022-01-01 DEVICE — SCREW GAMMA3 10.5 THRD 105MM: Type: IMPLANTABLE DEVICE | Site: HIP | Status: FUNCTIONAL

## 2022-01-01 RX ORDER — GABAPENTIN 100 MG/1
CAPSULE ORAL
Qty: 60 CAPSULE | Refills: 11 | Status: SHIPPED | OUTPATIENT
Start: 2022-01-01

## 2022-01-01 RX ORDER — EZETIMIBE 10 MG/1
10 TABLET ORAL DAILY
Status: DISCONTINUED | OUTPATIENT
Start: 2022-01-01 | End: 2022-01-01 | Stop reason: HOSPADM

## 2022-01-01 RX ORDER — ETOMIDATE 2 MG/ML
INJECTION INTRAVENOUS
Status: DISCONTINUED | OUTPATIENT
Start: 2022-01-01 | End: 2022-01-01

## 2022-01-01 RX ORDER — LIDOCAINE HYDROCHLORIDE 20 MG/ML
INJECTION, SOLUTION INFILTRATION; PERINEURAL CODE/TRAUMA/SEDATION MEDICATION
Status: COMPLETED | OUTPATIENT
Start: 2022-01-01 | End: 2022-01-01

## 2022-01-01 RX ORDER — CLOPIDOGREL BISULFATE 75 MG/1
75 TABLET ORAL DAILY
Status: DISCONTINUED | OUTPATIENT
Start: 2022-01-01 | End: 2022-01-01 | Stop reason: HOSPADM

## 2022-01-01 RX ORDER — ENOXAPARIN SODIUM 100 MG/ML
30 INJECTION SUBCUTANEOUS EVERY 24 HOURS
Status: DISCONTINUED | OUTPATIENT
Start: 2022-01-01 | End: 2022-01-01 | Stop reason: HOSPADM

## 2022-01-01 RX ORDER — HYDROMORPHONE HYDROCHLORIDE 2 MG/ML
0.2 INJECTION, SOLUTION INTRAMUSCULAR; INTRAVENOUS; SUBCUTANEOUS EVERY 5 MIN PRN
Status: CANCELLED | OUTPATIENT
Start: 2022-01-01

## 2022-01-01 RX ORDER — FUROSEMIDE 10 MG/ML
80 INJECTION INTRAMUSCULAR; INTRAVENOUS ONCE
Status: COMPLETED | OUTPATIENT
Start: 2022-01-01 | End: 2022-01-01

## 2022-01-01 RX ORDER — METOLAZONE 2.5 MG/1
2.5 TABLET ORAL DAILY
Qty: 90 TABLET | Refills: 0 | Status: SHIPPED | OUTPATIENT
Start: 2022-01-01

## 2022-01-01 RX ORDER — CARVEDILOL 3.12 MG/1
3.12 TABLET ORAL 2 TIMES DAILY WITH MEALS
Qty: 180 TABLET | Refills: 1 | Status: SHIPPED | OUTPATIENT
Start: 2022-01-01

## 2022-01-01 RX ORDER — CARVEDILOL 3.12 MG/1
3.12 TABLET ORAL 2 TIMES DAILY WITH MEALS
Status: DISCONTINUED | OUTPATIENT
Start: 2022-01-01 | End: 2022-01-01 | Stop reason: HOSPADM

## 2022-01-01 RX ORDER — CISATRACURIUM BESYLATE 10 MG/ML
INJECTION, SOLUTION INTRAVENOUS
Status: DISCONTINUED | OUTPATIENT
Start: 2022-01-01 | End: 2022-01-01

## 2022-01-01 RX ORDER — IBUPROFEN 200 MG
16 TABLET ORAL
Status: DISCONTINUED | OUTPATIENT
Start: 2022-01-01 | End: 2022-01-01 | Stop reason: HOSPADM

## 2022-01-01 RX ORDER — ALLOPURINOL 100 MG/1
300 TABLET ORAL DAILY
Status: DISCONTINUED | OUTPATIENT
Start: 2022-01-01 | End: 2022-01-01 | Stop reason: HOSPADM

## 2022-01-01 RX ORDER — EZETIMIBE 10 MG/1
TABLET ORAL
Qty: 90 TABLET | Refills: 1 | Status: SHIPPED | OUTPATIENT
Start: 2022-01-01

## 2022-01-01 RX ORDER — EPINEPHRINE 0.1 MG/ML
INJECTION INTRAVENOUS CODE/TRAUMA/SEDATION MEDICATION
Status: COMPLETED | OUTPATIENT
Start: 2022-01-01 | End: 2022-01-01

## 2022-01-01 RX ORDER — IBUPROFEN 200 MG
24 TABLET ORAL
Status: DISCONTINUED | OUTPATIENT
Start: 2022-01-01 | End: 2022-01-01 | Stop reason: HOSPADM

## 2022-01-01 RX ORDER — OXYCODONE AND ACETAMINOPHEN 7.5; 325 MG/1; MG/1
1 TABLET ORAL EVERY 4 HOURS PRN
Status: DISCONTINUED | OUTPATIENT
Start: 2022-01-01 | End: 2022-01-01 | Stop reason: HOSPADM

## 2022-01-01 RX ORDER — ONDANSETRON 2 MG/ML
4 INJECTION INTRAMUSCULAR; INTRAVENOUS DAILY PRN
Status: CANCELLED | OUTPATIENT
Start: 2022-01-01

## 2022-01-01 RX ORDER — DOBUTAMINE HYDROCHLORIDE 400 MG/100ML
5-20 INJECTION INTRAVENOUS CONTINUOUS
Status: DISCONTINUED | OUTPATIENT
Start: 2022-01-01 | End: 2022-01-01 | Stop reason: HOSPADM

## 2022-01-01 RX ORDER — BRIMONIDINE TARTRATE 2 MG/ML
1 SOLUTION/ DROPS OPHTHALMIC 3 TIMES DAILY
Status: DISCONTINUED | OUTPATIENT
Start: 2022-01-01 | End: 2022-01-01 | Stop reason: HOSPADM

## 2022-01-01 RX ORDER — SODIUM BICARBONATE 1 MEQ/ML
SYRINGE (ML) INTRAVENOUS CODE/TRAUMA/SEDATION MEDICATION
Status: COMPLETED | OUTPATIENT
Start: 2022-01-01 | End: 2022-01-01

## 2022-01-01 RX ORDER — GLUCAGON 1 MG
1 KIT INJECTION
Status: DISCONTINUED | OUTPATIENT
Start: 2022-01-01 | End: 2022-01-01 | Stop reason: HOSPADM

## 2022-01-01 RX ORDER — METOLAZONE 2.5 MG/1
TABLET ORAL
Qty: 90 TABLET | Refills: 0 | OUTPATIENT
Start: 2022-01-01

## 2022-01-01 RX ORDER — METOLAZONE 2.5 MG/1
2.5 TABLET ORAL DAILY
Status: DISCONTINUED | OUTPATIENT
Start: 2022-01-01 | End: 2022-01-01

## 2022-01-01 RX ORDER — ALLOPURINOL 300 MG/1
300 TABLET ORAL DAILY
Qty: 30 TABLET | Refills: 3 | Status: SHIPPED | OUTPATIENT
Start: 2022-01-01

## 2022-01-01 RX ORDER — TORSEMIDE 10 MG/1
20 TABLET ORAL DAILY
Status: DISCONTINUED | OUTPATIENT
Start: 2022-01-01 | End: 2022-01-01

## 2022-01-01 RX ORDER — DORZOLAMIDE HYDROCHLORIDE AND TIMOLOL MALEATE 20; 5 MG/ML; MG/ML
1 SOLUTION/ DROPS OPHTHALMIC 3 TIMES DAILY
Status: DISCONTINUED | OUTPATIENT
Start: 2022-01-01 | End: 2022-01-01 | Stop reason: HOSPADM

## 2022-01-01 RX ORDER — SODIUM CHLORIDE 0.9 % (FLUSH) 0.9 %
10 SYRINGE (ML) INJECTION
Status: DISCONTINUED | OUTPATIENT
Start: 2022-01-01 | End: 2022-01-01 | Stop reason: HOSPADM

## 2022-01-01 RX ORDER — FENTANYL CITRATE 50 UG/ML
INJECTION, SOLUTION INTRAMUSCULAR; INTRAVENOUS
Status: DISCONTINUED | OUTPATIENT
Start: 2022-01-01 | End: 2022-01-01

## 2022-01-01 RX ORDER — LIDOCAINE HYDROCHLORIDE 20 MG/ML
INJECTION INTRAVENOUS
Status: DISCONTINUED | OUTPATIENT
Start: 2022-01-01 | End: 2022-01-01

## 2022-01-01 RX ORDER — INSULIN ASPART 100 [IU]/ML
1-10 INJECTION, SOLUTION INTRAVENOUS; SUBCUTANEOUS
Status: DISCONTINUED | OUTPATIENT
Start: 2022-01-01 | End: 2022-01-01 | Stop reason: HOSPADM

## 2022-01-01 RX ORDER — (INSULIN DEGLUDEC AND LIRAGLUTIDE) 100; 3.6 [IU]/ML; MG/ML
35 INJECTION, SOLUTION SUBCUTANEOUS DAILY
Qty: 15 ML | Refills: 5 | Status: SHIPPED | OUTPATIENT
Start: 2022-01-01

## 2022-01-01 RX ORDER — INSULIN PUMP SYRINGE, 3 ML
EACH MISCELLANEOUS
Qty: 1 EACH | Refills: 0 | Status: SHIPPED | OUTPATIENT
Start: 2022-01-01 | End: 2023-01-18

## 2022-01-01 RX ORDER — ALBUMIN HUMAN 250 G/1000ML
50 SOLUTION INTRAVENOUS ONCE AS NEEDED
Status: DISCONTINUED | OUTPATIENT
Start: 2022-01-01 | End: 2022-01-01

## 2022-01-01 RX ORDER — SODIUM BICARBONATE 1 MEQ/ML
SYRINGE (ML) INTRAVENOUS
Status: DISCONTINUED | OUTPATIENT
Start: 2022-01-01 | End: 2022-01-01

## 2022-01-01 RX ORDER — HYDROCODONE BITARTRATE AND ACETAMINOPHEN 500; 5 MG/1; MG/1
TABLET ORAL
Status: DISCONTINUED | OUTPATIENT
Start: 2022-01-01 | End: 2022-01-01 | Stop reason: HOSPADM

## 2022-01-01 RX ORDER — LANCETS
EACH MISCELLANEOUS
Qty: 200 EACH | Refills: 3 | Status: SHIPPED | OUTPATIENT
Start: 2022-01-01

## 2022-01-01 RX ORDER — ASPIRIN 81 MG/1
81 TABLET ORAL DAILY
Status: DISCONTINUED | OUTPATIENT
Start: 2022-01-01 | End: 2022-01-01 | Stop reason: HOSPADM

## 2022-01-01 RX ORDER — EPINEPHRINE 0.1 MG/ML
INJECTION INTRAVENOUS
Status: DISCONTINUED | OUTPATIENT
Start: 2022-01-01 | End: 2022-01-01

## 2022-01-01 RX ORDER — TORSEMIDE 20 MG/1
TABLET ORAL
Qty: 240 TABLET | Refills: 2 | Status: SHIPPED | OUTPATIENT
Start: 2022-01-01

## 2022-01-01 RX ORDER — GABAPENTIN 100 MG/1
200 CAPSULE ORAL NIGHTLY
Status: DISCONTINUED | OUTPATIENT
Start: 2022-01-01 | End: 2022-01-01 | Stop reason: HOSPADM

## 2022-01-01 RX ORDER — MUPIROCIN 20 MG/G
OINTMENT TOPICAL 2 TIMES DAILY
Status: DISCONTINUED | OUTPATIENT
Start: 2022-01-01 | End: 2022-01-01 | Stop reason: HOSPADM

## 2022-01-01 RX ORDER — POTASSIUM CHLORIDE 20 MEQ/1
60 TABLET, EXTENDED RELEASE ORAL ONCE
Status: COMPLETED | OUTPATIENT
Start: 2022-01-01 | End: 2022-01-01

## 2022-01-01 RX ORDER — HYDROCODONE BITARTRATE AND ACETAMINOPHEN 5; 325 MG/1; MG/1
1 TABLET ORAL
Status: COMPLETED | OUTPATIENT
Start: 2022-01-01 | End: 2022-01-01

## 2022-01-01 RX ORDER — CEFAZOLIN SODIUM 2 G/50ML
2 SOLUTION INTRAVENOUS
Status: DISCONTINUED | OUTPATIENT
Start: 2022-01-01 | End: 2022-01-01 | Stop reason: HOSPADM

## 2022-01-01 RX ORDER — SODIUM CHLORIDE 0.9 % (FLUSH) 0.9 %
10 SYRINGE (ML) INJECTION
Status: CANCELLED | OUTPATIENT
Start: 2022-01-01

## 2022-01-01 RX ADMIN — ASPIRIN 81 MG: 81 TABLET, COATED ORAL at 02:04

## 2022-01-01 RX ADMIN — SODIUM BICARBONATE 100 MEQ: 84 INJECTION, SOLUTION INTRAVENOUS at 01:04

## 2022-01-01 RX ADMIN — EPINEPHRINE 50 MCG: 0.1 INJECTION, SOLUTION ENDOTRACHEAL; INTRACARDIAC; INTRAVENOUS at 01:04

## 2022-01-01 RX ADMIN — DORZOLAMIDE HYDROCHLORIDE AND TIMOLOL MALEATE 1 DROP: 20; 5 SOLUTION/ DROPS OPHTHALMIC at 04:04

## 2022-01-01 RX ADMIN — DORZOLAMIDE HYDROCHLORIDE AND TIMOLOL MALEATE 1 DROP: 20; 5 SOLUTION/ DROPS OPHTHALMIC at 09:04

## 2022-01-01 RX ADMIN — LIDOCAINE HYDROCHLORIDE 9 ML: 20 INJECTION, SOLUTION INFILTRATION; PERINEURAL at 10:01

## 2022-01-01 RX ADMIN — ETOMIDATE 8 MG: 2 INJECTION, SOLUTION INTRAVENOUS at 12:04

## 2022-01-01 RX ADMIN — METOLAZONE 2.5 MG: 2.5 TABLET ORAL at 04:04

## 2022-01-01 RX ADMIN — BRIMONIDINE TARTRATE 1 DROP: 2 SOLUTION/ DROPS OPHTHALMIC at 04:04

## 2022-01-01 RX ADMIN — ENOXAPARIN SODIUM 30 MG: 30 INJECTION, SOLUTION INTRAVENOUS; SUBCUTANEOUS at 04:04

## 2022-01-01 RX ADMIN — EZETIMIBE 10 MG: 10 TABLET ORAL at 02:04

## 2022-01-01 RX ADMIN — EPINEPHRINE 10 MCG: 0.1 INJECTION, SOLUTION ENDOTRACHEAL; INTRACARDIAC; INTRAVENOUS at 01:04

## 2022-01-01 RX ADMIN — INSULIN ASPART 3 UNITS: 100 INJECTION, SOLUTION INTRAVENOUS; SUBCUTANEOUS at 09:04

## 2022-01-01 RX ADMIN — MUPIROCIN: 20 OINTMENT TOPICAL at 09:04

## 2022-01-01 RX ADMIN — EPINEPHRINE 10 MCG: 0.1 INJECTION, SOLUTION ENDOTRACHEAL; INTRACARDIAC; INTRAVENOUS at 12:04

## 2022-01-01 RX ADMIN — EPINEPHRINE 1 MG: 0.1 INJECTION, SOLUTION ENDOTRACHEAL; INTRACARDIAC; INTRAVENOUS at 01:04

## 2022-01-01 RX ADMIN — COLLAGENASE SANTYL: 250 OINTMENT TOPICAL at 08:04

## 2022-01-01 RX ADMIN — LIDOCAINE HYDROCHLORIDE 10 ML: 20 INJECTION, SOLUTION INFILTRATION; PERINEURAL at 08:02

## 2022-01-01 RX ADMIN — ALLOPURINOL 300 MG: 100 TABLET ORAL at 08:04

## 2022-01-01 RX ADMIN — HYDROCODONE BITARTRATE AND ACETAMINOPHEN 1 TABLET: 5; 325 TABLET ORAL at 06:04

## 2022-01-01 RX ADMIN — EPINEPHRINE 45 MCG: 0.1 INJECTION, SOLUTION ENDOTRACHEAL; INTRACARDIAC; INTRAVENOUS at 01:04

## 2022-01-01 RX ADMIN — TORSEMIDE 20 MG: 10 TABLET ORAL at 02:04

## 2022-01-01 RX ADMIN — LIDOCAINE HYDROCHLORIDE 10 ML: 20 INJECTION, SOLUTION INFILTRATION; PERINEURAL at 10:01

## 2022-01-01 RX ADMIN — COLLAGENASE SANTYL: 250 OINTMENT TOPICAL at 04:04

## 2022-01-01 RX ADMIN — CISATRACURIUM BESYLATE 8 MG: 10 INJECTION, SOLUTION INTRAVENOUS at 12:04

## 2022-01-01 RX ADMIN — ALLOPURINOL 300 MG: 100 TABLET ORAL at 02:04

## 2022-01-01 RX ADMIN — OXYCODONE AND ACETAMINOPHEN 1 TABLET: 7.5; 325 TABLET ORAL at 11:04

## 2022-01-01 RX ADMIN — LIDOCAINE HYDROCHLORIDE 100 MG: 20 INJECTION, SOLUTION INTRAVENOUS at 01:04

## 2022-01-01 RX ADMIN — CISATRACURIUM BESYLATE 10 MG: 10 INJECTION, SOLUTION INTRAVENOUS at 01:04

## 2022-01-01 RX ADMIN — FUROSEMIDE 80 MG: 10 INJECTION, SOLUTION INTRAMUSCULAR; INTRAVENOUS at 02:04

## 2022-01-01 RX ADMIN — LIDOCAINE HYDROCHLORIDE 10 ML: 20 INJECTION, SOLUTION INFILTRATION; PERINEURAL at 09:03

## 2022-01-01 RX ADMIN — BRIMONIDINE TARTRATE 1 DROP: 2 SOLUTION/ DROPS OPHTHALMIC at 09:04

## 2022-01-01 RX ADMIN — EZETIMIBE 10 MG: 10 TABLET ORAL at 08:04

## 2022-01-01 RX ADMIN — CARVEDILOL 3.12 MG: 3.12 TABLET, FILM COATED ORAL at 08:04

## 2022-01-01 RX ADMIN — POTASSIUM CHLORIDE 60 MEQ: 20 TABLET, EXTENDED RELEASE ORAL at 02:04

## 2022-01-01 RX ADMIN — BRIMONIDINE TARTRATE 1 DROP: 2 SOLUTION/ DROPS OPHTHALMIC at 08:04

## 2022-01-01 RX ADMIN — SODIUM BICARBONATE 50 MEQ: 84 INJECTION, SOLUTION INTRAVENOUS at 01:04

## 2022-01-01 RX ADMIN — SODIUM CHLORIDE, SODIUM LACTATE, POTASSIUM CHLORIDE, AND CALCIUM CHLORIDE: .6; .31; .03; .02 INJECTION, SOLUTION INTRAVENOUS at 11:04

## 2022-01-01 RX ADMIN — FENTANYL CITRATE 100 MCG: 50 INJECTION, SOLUTION INTRAMUSCULAR; INTRAVENOUS at 12:04

## 2022-01-01 RX ADMIN — INSULIN ASPART 4 UNITS: 100 INJECTION, SOLUTION INTRAVENOUS; SUBCUTANEOUS at 08:04

## 2022-01-01 RX ADMIN — OXYCODONE AND ACETAMINOPHEN 1 TABLET: 7.5; 325 TABLET ORAL at 10:04

## 2022-01-01 RX ADMIN — ETOMIDATE 12 MG: 2 INJECTION, SOLUTION INTRAVENOUS at 12:04

## 2022-01-01 RX ADMIN — CLOPIDOGREL 75 MG: 75 TABLET, FILM COATED ORAL at 04:04

## 2022-01-01 RX ADMIN — CEFAZOLIN SODIUM 1 G: 1 POWDER, FOR SOLUTION INTRAMUSCULAR; INTRAVENOUS at 12:04

## 2022-01-01 RX ADMIN — DEXTROSE MONOHYDRATE 0.2 MCG/KG/MIN: 50 INJECTION, SOLUTION INTRAVENOUS at 12:04

## 2022-01-01 RX ADMIN — GABAPENTIN 200 MG: 100 CAPSULE ORAL at 09:04

## 2022-01-01 RX ADMIN — DORZOLAMIDE HYDROCHLORIDE AND TIMOLOL MALEATE 1 DROP: 20; 5 SOLUTION/ DROPS OPHTHALMIC at 08:04

## 2022-01-01 RX ADMIN — ASPIRIN 81 MG: 81 TABLET, COATED ORAL at 08:04

## 2022-01-01 RX ADMIN — EPINEPHRINE 25 MCG: 0.1 INJECTION, SOLUTION ENDOTRACHEAL; INTRACARDIAC; INTRAVENOUS at 01:04

## 2022-01-01 RX ADMIN — CARVEDILOL 3.12 MG: 3.12 TABLET, FILM COATED ORAL at 04:04

## 2022-01-01 RX ADMIN — MUPIROCIN: 20 OINTMENT TOPICAL at 08:04

## 2022-01-01 RX ADMIN — EPINEPHRINE 40 MCG: 0.1 INJECTION, SOLUTION ENDOTRACHEAL; INTRACARDIAC; INTRAVENOUS at 01:04

## 2022-01-01 RX ADMIN — EPINEPHRINE 20 MCG: 0.1 INJECTION, SOLUTION ENDOTRACHEAL; INTRACARDIAC; INTRAVENOUS at 12:04

## 2022-01-01 NOTE — PROGRESS NOTES
[facility]  Vascular Surgery  Progress Note    Patient Name: Marvin Ray  MRN: [id number]  Admission Date: 11/8/2018  Primary Care Provider: Rubén Davis MD    Subjective:     Interval History: No complaints this morning.    Post-Op Info:  Procedure(s) (LRB):  AORTOGRAM, WITH SERIALOGRAPHY, LEFT LOWER EXTREMITY, POSSIBLE INTERVENTION (Left)           Medications:  Continuous Infusions:   sodium chloride 0.9% 50 mL/hr at 11/12/18 0538     Scheduled Meds:   amLODIPine  5 mg Oral Daily    aspirin  81 mg Oral Daily    brimonidine 0.1%  1 drop Both Eyes TID    carvedilol  6.25 mg Oral BID    cefTRIAXone (ROCEPHIN) IVPB  1 g Intravenous Q12H    colchicine  0.3 mg Oral Daily    dorzolamide-timolol 2-0.5%  1 drop Both Eyes BID    enoxaparin  40 mg Subcutaneous Daily    ezetimibe  10 mg Oral Daily    furosemide  20 mg Oral Daily    lactulose  10 g Oral BID    prednisoLONE acetate  1 drop Right Eye TID    senna-docusate 8.6-50 mg  1 tablet Oral BID    sodium bicarbonate  650 mg Oral BID     PRN Meds:acetaminophen, dextrose 50%, dextrose 50%, glucagon (human recombinant), glucose, glucose, influenza, insulin aspart U-100, oxyCODONE-acetaminophen     Objective:     Vital Signs (Most Recent):  Temp: 98.6 °F (37 °C) (11/12/18 0643)  Pulse: 89 (11/12/18 0643)  Resp: 18 (11/12/18 0643)  BP: (!) 122/58 (11/12/18 0643)  SpO2: (!) 90 % (11/12/18 0643) Vital Signs (24h Range):  Temp:  [96.1 °F (35.6 °C)-98.6 °F (37 °C)] 98.6 °F (37 °C)  Pulse:  [76-89] 89  Resp:  [18-20] 18  SpO2:  [90 %-100 %] 90 %  BP: (115-164)/(58-69) 122/58          Physical Exam   Constitutional: He is oriented to person, place, and time. He appears well-developed and well-nourished. No distress.   HENT:   Head: Normocephalic and atraumatic.   Eyes: Conjunctivae are normal.   Neck: Neck supple.   Cardiovascular: Normal rate.   Pulses:       Femoral pulses are 2+ on the right side, and 2+ on the left side.       Dorsalis  pedis pulses are Detected w/ doppler on the right side, and Detected w/ doppler on the left side.        Posterior tibial pulses are Detected w/ doppler on the right side, and Detected w/ doppler on the left side.   Pulmonary/Chest: Effort normal. No respiratory distress. He exhibits no tenderness.   Abdominal: Soft. He exhibits no distension and no mass. There is no tenderness. There is no rebound and no guarding.   Musculoskeletal: Normal range of motion. He exhibits no edema, tenderness or deformity.   Feet:   Right Foot:   Skin Integrity: Negative for ulcer, blister, skin breakdown, callus or dry skin.   Left Foot:   Skin Integrity: Positive for ulcer, blister, skin breakdown, callus and dry skin.   Neurological: He is alert and oriented to person, place, and time. No sensory deficit.   Skin: Skin is warm and dry. Capillary refill takes less than 2 seconds. No rash noted. No erythema. No pallor.   Psychiatric: He has a normal mood and affect.   Vitals reviewed.      Significant Labs:  All pertinent labs from the last 24 hours have been reviewed.    Significant Diagnostics:  I have reviewed all pertinent imaging results/findings within the past 24 hours.    Assessment/Plan:     Open wound of left foot    -L foot wound multifactorial likely due to DM, PVD and venous insufficiency - rec angiography with possible intervention, plan for 11/12/18 now that K and Cr has normalized;   -cont optimization with Nephrology consult if persists   -Cont wound care consult recs  -Cont offloading  -Abx per primary team  -Strict glycemic control         Mitch Chan MD  Vascular Surgery  [facility]   No Vaccines Administered.

## 2022-01-06 NOTE — DISCHARGE SUMMARY
Radiology Discharge Summary      Hospital Course: No complications    Admit Date: 1/6/2022  Discharge Date: 01/06/2022     Instructions Given to Patient: Yes  Diet: Resume prior diet  Activity: activity as tolerated    Description of Condition on Discharge: Stable  Vital Signs (Most Recent): BP: 126/77 (01/06/22 1056)    Discharge Disposition: Home    Discharge Diagnosis:   60 y/o M with h/o recurrent painful, tense ascites s/p successful US-guided percutaneous RUQ-approach therapeutic LVP with local anesthetic only and albumin infusion post as indicated per institutional protocol. Patient tolerated the procedures well. No immediate post-procedural complications noted.      Thank you for considering IR for the care of your patient.      Zach Cha MD  Interventional Radiology

## 2022-01-06 NOTE — H&P
Radiology History & Physical      SUBJECTIVE:     Chief Complaint: Recurrent painful, tense ascites      History of Present Illness:  Marvin Ray is a 61 y.o. male with PMHx of CKD III and combined systolic/diastolic CHF complicated by recurrent abdominal distension and pain 2/2 recurrent painful, tense ascites requiring frequent  therapeutic large-volume paracentesis.      A new outpatient IR consult placed for US-guided therapeutic percutaneous RUQ-approach LVP.    Past Medical History:   Diagnosis Date    Arthritis     Cellulitis     CKD (chronic kidney disease), stage III     Coronary artery disease     Diabetes mellitus     Diabetic retinopathy     Diabetic ulcer of left foot     Glaucoma     Gout     Hyperlipemia     Hypertension     ICD (implantable cardioverter-defibrillator) in place 11/02/2018    Left chest    Non-pressure chronic ulcer of other part of left foot with fat layer exposed 10/23/2018    PVD (peripheral vascular disease)     Type 2 diabetes mellitus with left diabetic foot ulcer 10/29/2018    Unsteady gait     uses a wheelchair     Past Surgical History:   Procedure Laterality Date    AHMED GLAUCOMA IMPLANT Left 2011    DONE AT [facility]    AORTOGRAPHY WITH SERIALOGRAPHY Left 4/30/2019    Procedure: AORTOGRAM, WITH SERIALOGRAPHY, LEFT LOWER EXTREMITY, POSSIBLE INTERVENTION;  Surgeon: Mitch Chan MD;  Location: [facility] OR;  Service: Vascular;  Laterality: Left;  RN PRE OP 4-23-19.  NO H & P, No Cosent  [place]    AORTOGRAPHY WITH SERIALOGRAPHY Right 10/6/2020    Procedure: AORTOGRAM, WITH SERIALOGRAPHY, RIGHT LOWER EXTREMITY ANGIOGRAM, POSSIBLE INTERVENTION;  Surgeon: Mitch Chan MD;  Location: [facility] OR;  Service: Vascular;  Laterality: Right;  RN PREOP 10/1/2020--COVID NEGATIVE ON 10/5    AORTOGRAPHY WITH SERIALOGRAPHY Right 1/13/2021    Procedure: AORTOGRAM, WITH RIGHT LOWER EXTREMITY ANGIOGRAM, POSSIBLE INTERVENTION;  Surgeon: Mitch Chan MD;   Location: [facility] OR;  Service: Vascular;  Laterality: Right;  1PM START----NEED CONSENT  RN PREOP 1/8/2021--COVID NEGATIVE ON 1/12    AORTOGRAPHY WITH SERIALOGRAPHY Right 1/26/2021    Procedure: AORTOGRAM, RIGHT LOWER EXTREMITY ANGIOGRAM, POSSIBLE INTERVENTION;  Surgeon: Mitch Chan MD;  Location: [facility] OR;  Service: Vascular;  Laterality: Right;  RN PREOP 1/25/2021, COVID NEGATIVE ON 1/25, cxr, and ecg done    AORTOGRAPHY WITH SERIALOGRAPHY Right 3/30/2021    Procedure: AORTOGRAM, WITH RIGHT LOWER EXTREMITY ANGIOGRAM, POSSIBLE INTERVENTION;  Surgeon: Mitch Chan MD;  Location: [facility] OR;  Service: Vascular;  Laterality: Right;  RN PREOP 3/26/2021  COVID NEGATIVE    BAERVELDT GLAUCOMA IMPLANT Left 2012    WITH CATARACT EXTRACTION//DONE AT [facility]    BELOW KNEE AMPUTATION OF LOWER EXTREMITY Right 5/17/2021    Procedure: AMPUTATION, BELOW KNEE;  Surgeon: Christiana Pritchett MD;  Location: [facility] OR;  Service: Orthopedics;  Laterality: Right;  WOUND VAC -PREVENA  SUPINE---HAS--ICD- Iast interrogation -3/21  AMPUTATION TRAY  1ST CASE OKWHITNEY WHITLOCK  RN PREOP 5/13/2021     COVID --NEGATIVE ON 5/14---INCOMPLETE H/P    BIOPSY Right 12/18/2020    Procedure: Biopsy- trocar biopsy;  Surgeon: Brianna Champion DPM;  Location: [facility] OR;  Service: Podiatry;  Laterality: Right;    CARDIAC CATHETERIZATION Left 05/2016    CARDIAC DEFIBRILLATOR PLACEMENT Left 11/02/2018    CATARACT EXTRACTION W/  INTRAOCULAR LENS IMPLANT Left 2012    WITH BAERVEDT (DONE AT [facility])    CATARACT EXTRACTION W/  INTRAOCULAR LENS IMPLANT Right 09/26/2018    COMPLEX ()    CB DESTRUCTION WITH CYCLO G6 Left 02/15/2017        CYST REMOVAL      DEBRIDEMENT OF FOOT  12/28/2018    Procedure: DEBRIDEMENT, FOOT;  Surgeon: Mitch Chan MD;  Location: [facility] OR;  Service: Vascular;;    DEBRIDEMENT OF FOOT  6/5/2019    Procedure: DEBRIDEMENT, FOOT;  Surgeon: Mitch Chan MD;  Location: [facility] OR;  Service:  Vascular;;    DEBRIDEMENT OF FOOT Right 3/5/2021    Procedure: DEBRIDEMENT, FOOT with Misonic device;  Surgeon: Mitch Wall MD;  Location: [facility] OR;  Service: Vascular;  Laterality: Right;    EXAMINATION UNDER ANESTHESIA Left 12/5/2018    Procedure: Exam under anesthesia, left foot debridement, washout and all other indicated procedures;  Surgeon: Mitch Wall MD;  Location: [facility] OR;  Service: Vascular;  Laterality: Left;  1030AM START  RN PREOP 12/3/2018-----AICD  SEEN BY DR JAMES 12/4    EXAMINATION UNDER ANESTHESIA Left 2/13/2019    Procedure: LEFT FOOT WOUND DEBRIDEMENT, WASHOUT AND ALL OTHER INDICATED PROCEDURES;  Surgeon: Mitch Wall MD;  Location: [facility] OR;  Service: Vascular;  Laterality: Left;  requesting 1st case start  THIS CASE 1ST PER DR WALL ON 2/1/19 @ 844AM -LO  RN PREOP 2/6/2019  HAS CARDIAC CLEARANCE    EXAMINATION UNDER ANESTHESIA Left 12/28/2018    Procedure: Exam under anesthesia, left foot debridement, left foot washout, possible left second through fifth metatarsal resection;  Surgeon: Mitch Wall MD;  Location: [facility] OR;  Service: Vascular;  Laterality: Left;  1:00pm start per julissa @ 8:58am  RN  PHONE PRE OP 12-27-18--=Pt coming [facility]  NEED CONSENT    EXAMINATION UNDER ANESTHESIA Left 6/5/2019    Procedure: LEFT FOOT DEBRIDEMENT, WASHOUT AND ALL OTHER INDICATED PROCEDURES;  Surgeon: Mitch Wall MD;  Location: [facility] OR;  Service: Vascular;  Laterality: Left;  RN PRE OP 5-31-19------ICD------NEED CONSENT    EXAMINATION UNDER ANESTHESIA Right 11/9/2020    Procedure: RIGHT FOOT DEBRIDEMENT, WASHOUT AND ALL OTHER INDICATED PROCEDURES;  Surgeon: Mitch Wall MD;  Location: [facility] OR;  Service: Vascular;  Laterality: Right;  RN PREOP 10/27/2020, --COVID NEGATIVE ON 11/6, has cardiac clearance/[place] 10/27/2020, pt has ICD  NEEDS ORDERS    EXAMINATION UNDER ANESTHESIA Right 2/4/2021    Procedure: RIGHT FOOT DEBRIDEMENT,  POSSIBLE CALCANECTOMY, POSSIBLE FIFTH TOE AMPUTATION, WASHOUT AND ALL OTHER INDICATED PROCEDURES;  Surgeon: Mitch Chan MD;  Location: [facility] OR;  Service: Vascular;  Laterality: Right;  RN PREOP 2/2/2021--COVID NEGATIVE ON 2/2  ICD    EYE SURGERY      CAT EXT OU    FOOT AMPUTATION THROUGH METATARSAL Right 3/5/2021    Procedure: Right foot wound debridement, possible transmetatarsal amputation, possible fifth toe amputation, washout;  Surgeon: Mitch Chan MD;  Location: [facility] OR;  Service: Vascular;  Laterality: Right;  MISONIX SONIC ONE JAYDEN LOUISE [phone] SPOKE TO HIM ON MY OFFICE @ 7:31AM ON 2-  RN PRE Op, Covid NEGATIVE ON  3-2-21.  C A  8:30AM START----NEED H/P    INCISION AND DRAINAGE Left 11/14/2018    Procedure: Incision and Drainage, left lower extremity debridement, washout;  Surgeon: Mitch Chan MD;  Location: [facility] OR;  Service: Vascular;  Laterality: Left;    INCISION AND DRAINAGE Left 11/16/2018    Procedure: INCISION AND DRAINAGE;  Surgeon: Mitch Chan MD;  Location: [facility] OR;  Service: Vascular;  Laterality: Left;    INCISION AND DRAINAGE Right 11/18/2020    Procedure: Debridement and washout right lower extremity wounds;  Surgeon: Mitch Chan MD;  Location: [facility] OR 2ND FLR;  Service: Vascular;  Laterality: Right;    INCISION AND DRAINAGE Right 1/27/2021    Procedure: RIGHT FOOT DEBRIDEMENT, WASHOUT AND ALL OTHER INDICATED PROCEDURES;  Surgeon: iMtch Chan MD;  Location: [facility] OR 2ND FLR;  Service: Vascular;  Laterality: Right;    INTRAOCULAR PROSTHESES INSERTION Right 9/26/2018    Procedure: INSERTION, IOL PROSTHESIS;  Surgeon: Perla Cortés MD;  Location: [facility] OR 1ST FLR;  Service: Ophthalmology;  Laterality: Right;    PERITONEOCENTESIS N/A 3/1/2019    Procedure: PARACENTESIS, ABDOMINAL, INITIAL;  Surgeon: [facility] Diagnostic Provider;  Location: [facility] OR;  Service: Radiology;  Laterality: N/A;    PERITONEOCENTESIS N/A  3/25/2019    Procedure: PARACENTESIS, ABDOMINAL, INITIAL;  Surgeon: Dosc Diagnostic Provider;  Location: [facility] OR;  Service: Radiology;  Laterality: N/A;    PERITONEOCENTESIS N/A 7/22/2019    Procedure: PARACENTESIS, ABDOMINAL, INITIAL;  Surgeon: Dosc Diagnostic Provider;  Location: [facility] OR;  Service: Radiology;  Laterality: N/A;    PERITONEOCENTESIS N/A 8/16/2019    Procedure: PARACENTESIS, ABDOMINAL, INITIAL;  Surgeon: Dosc Diagnostic Provider;  [facility]: [facility] OR;  Service: Radiology;  Laterality: N/A;    PERITONEOCENTESIS N/A 9/26/2019    Procedure: PARACENTESIS, ABDOMINAL;  Surgeon: Dosc Diagnostic Provider;  [facility] OR;  Service: Radiology;  Laterality: N/A;    PERITONEOCENTESIS N/A 10/11/2019    Procedure: PARACENTESIS, ABDOMINAL;  Surgeon: Dosc Diagnostic Provider;  [facility];  Service: Radiology;  Laterality: N/A;    PERITONEOCENTESIS N/A 10/31/2019    Procedure: PARACENTESIS, ABDOMINAL;  Surgeon: Dosc Diagnostic Provider;  [facility];  Service: Radiology;  Laterality: N/A;    PERITONEOCENTESIS N/A 11/18/2019    Procedure: PARACENTESIS, ABDOMINAL;  Surgeon: Dosc Diagnostic Provider;  [facility];  Service: Radiology;  Laterality: N/A;    PERITONEOCENTESIS N/A 12/16/2019    Procedure: PARACENTESIS, ABDOMINAL;  Surgeon: Dosc Diagnostic Provider;  [facility];  Service: Radiology;  Laterality: N/A;  2PM START    PERITONEOCENTESIS N/A 2/7/2020    Procedure: PARACENTESIS, ABDOMINAL;  Surgeon: Dosc Diagnostic Provider;  [facility];  Service: Radiology;  Laterality: N/A;  REQUESTED 10:30A START    PERITONEOCENTESIS N/A 2/19/2020    Procedure: PARACENTESIS, ABDOMINAL;  Surgeon: Dosc Diagnostic Provider;  [facility];  Service: Radiology;  Laterality: N/A;    PERITONEOCENTESIS N/A 2/28/2020    Procedure: PARACENTESIS, ABDOMINAL;  Surgeon: Dosc Diagnostic Provider;  Location: [facility] OR;  Service: Radiology;  Laterality: N/A;  REQUESTED 10AM START     PHACOEMULSIFICATION OF CATARACT Right 9/26/2018    Procedure: PHACOEMULSIFICATION, CATARACT;  Surgeon: Perla Cortés MD;  Location: [facility] OR [address] [place] [address];  Service: Ophthalmology;  Laterality: Right;    REPEAT ABDOMINAL PARACENTESIS N/A 2/11/2019    Procedure: PARACENTESIS, ABDOMINAL, REPEAT;  Surgeon: Dosc Diagnostic Provider;  Location: [facility] OR;  Service: Radiology;  Laterality: N/A;  1PM START    REPEAT ABDOMINAL PARACENTESIS N/A 9/12/2019    Procedure: PARACENTESIS, ABDOMINAL, REPEAT;  Surgeon: Dosc Diagnostic Provider;  Location: [facility] OR;  Service: Radiology;  Laterality: N/A;  9AM START    REPEAT ABDOMINAL PARACENTESIS N/A 12/2/2019    Procedure: PARACENTESIS, ABDOMINAL, REPEAT;  Surgeon: Dosc Diagnostic Provider;  Location: [facility] OR;  Service: Radiology;  Laterality: N/A;  3PM START    REPEAT ABDOMINAL PARACENTESIS N/A 1/10/2020    Procedure: PARACENTESIS, ABDOMINAL, REPEAT;  Surgeon: Dosc Diagnostic Provider;  Location: [facility] OR;  Service: Radiology;  Laterality: N/A;  1130AM START    REPEAT ABDOMINAL PARACENTESIS N/A 1/20/2020    Procedure: PARACENTESIS, ABDOMINAL, REPEAT;  Surgeon: Dosc Diagnostic Provider;  Location: [facility] OR;  Service: Radiology;  Laterality: N/A;  1PM START    REPEAT ABDOMINAL PARACENTESIS N/A 1/31/2020    Procedure: PARACENTESIS, ABDOMINAL, REPEAT;  Surgeon: Dosc Diagnostic Provider;  Location: [facility] OR;  Service: Radiology;  Laterality: N/A;  10AM START    REPEAT ABDOMINAL PARACENTESIS N/A 3/16/2020    Procedure: PARACENTESIS, ABDOMINAL, REPEAT;  Surgeon: Dosc Diagnostic Provider;  Location: [facility] OR;  Service: Radiology;  Laterality: N/A;  10AM START    REPEAT ABDOMINAL PARACENTESIS N/A 3/30/2020    Procedure: PARACENTESIS, ABDOMINAL, REPEAT;  Surgeon: Dosc Diagnostic Provider;  Location: [facility] OR;  Service: Radiology;  Laterality: N/A;    REPEAT ABDOMINAL PARACENTESIS N/A 4/9/2020    Procedure: PARACENTESIS, ABDOMINAL, REPEAT;  Surgeon: Dosc Diagnostic Provider;  Location: [facility]  OR;  Service: Radiology;  Laterality: N/A;  1130AM START    REPEAT ABDOMINAL PARACENTESIS N/A 5/8/2020    Procedure: PARACENTESIS, ABDOMINAL, REPEAT;  Surgeon: Dosc Diagnostic Provider;  Location: [facility] OR;  Service: Radiology;  Laterality: N/A;  9AM START    REPEAT ABDOMINAL PARACENTESIS N/A 5/21/2020    Procedure: PARACENTESIS, ABDOMINAL, REPEAT;  Surgeon: Dosc Diagnostic Provider;  Location: [facility] OR;  Service: Radiology;  Laterality: N/A;  10AM START    REPEAT ABDOMINAL PARACENTESIS N/A 6/5/2020    Procedure: PARACENTESIS, ABDOMINAL, REPEAT;  Surgeon: Dosc Diagnostic Provider;  Location: [facility] OR;  Service: Radiology;  Laterality: N/A;  NOON START    REPEAT ABDOMINAL PARACENTESIS N/A 7/10/2020    Procedure: PARACENTESIS, ABDOMINAL, REPEAT;  Surgeon: Dosc Diagnostic Provider;  Location: [facility] OR;  Service: Radiology;  Laterality: N/A;  1PM START    REPEAT ABDOMINAL PARACENTESIS N/A 8/20/2020    Procedure: PARACENTESIS, ABDOMINAL, REPEAT;  Surgeon: Dosc Diagnostic Provider;  Location: [facility] OR;  Service: Radiology;  Laterality: N/A;  1PM START    REPEAT ABDOMINAL PARACENTESIS N/A 9/4/2020    Procedure: PARACENTESIS, ABDOMINAL, REPEAT;  Surgeon: Dosc Diagnostic Provider;  Location: [facility] OR;  Service: Radiology;  Laterality: N/A;  11 AM START    REPEAT ABDOMINAL PARACENTESIS N/A 9/16/2020    Procedure: PARACENTESIS, ABDOMINAL, REPEAT;  Surgeon: Dosc Diagnostic Provider;  Location: [facility] OR;  Service: Radiology;  Laterality: N/A;  START 2:00 P.M.    REPEAT ABDOMINAL PARACENTESIS N/A 9/28/2020    Procedure: PARACENTESIS, ABDOMINAL, REPEAT;  Surgeon: Dosc Diagnostic Provider;  Location: [facility] OR;  Service: Radiology;  Laterality: N/A;  1 P.M. START    REPEAT ABDOMINAL PARACENTESIS N/A 11/3/2020    Procedure: PARACENTESIS, ABDOMINAL, REPEAT;  Surgeon: Dosc Diagnostic Provider;  Location: [facility] OR;  Service: Radiology;  Laterality: N/A;  11AM START    REPEAT ABDOMINAL PARACENTESIS N/A 11/13/2020    Procedure:  PARACENTESIS, ABDOMINAL, REPEAT;  Surgeon: Dosc Diagnostic Provider;  Location: [facility] OR;  Service: Radiology;  Laterality: N/A;  12PM START    REPEAT ABDOMINAL PARACENTESIS N/A 11/23/2020    Procedure: PARACENTESIS, ABDOMINAL, REPEAT;  Surgeon: Dosc Diagnostic Provider;  [facility] OR;  Service: Radiology;  Laterality: N/A;    REPEAT ABDOMINAL PARACENTESIS N/A 12/1/2020    Procedure: PARACENTESIS, ABDOMINAL, REPEAT;  Surgeon: Dosc Diagnostic Provider;  Location: [facility] OR;  Service: Radiology;  Laterality: N/A;  11AM START    REPEAT ABDOMINAL PARACENTESIS N/A 12/8/2020    Procedure: PARACENTESIS, ABDOMINAL, REPEAT;  Surgeon: Dosc Diagnostic Provider;  Location: [facility] OR;  Service: Radiology;  Laterality: N/A;  93AM START    REPEAT ABDOMINAL PARACENTESIS N/A 12/17/2020    Procedure: PARACENTESIS, ABDOMINAL, REPEAT;  Surgeon: Dosc Diagnostic Provider;  Location: [facility] OR;  Service: Radiology;  Laterality: N/A;  10AM START    REPEAT ABDOMINAL PARACENTESIS N/A 12/30/2020    Procedure: PARACENTESIS, ABDOMINAL, REPEAT;  Surgeon: Dosc Diagnostic Provider;  Location: [facility] OR;  Service: Radiology;  Laterality: N/A;  9 A.M. START    REPEAT ABDOMINAL PARACENTESIS N/A 1/12/2021    Procedure: PARACENTESIS, ABDOMINAL, REPEAT;  Surgeon: Dos Diagnostic Provider;  Location: [facility] OR;  Service: Radiology;  Laterality: N/A;  10 AM START    REPEAT ABDOMINAL PARACENTESIS N/A 2/10/2021    Procedure: PARACENTESIS, ABDOMINAL, REPEAT;  Surgeon: Dosc Diagnostic Provider;  Location: [facility] OR;  Service: Radiology;  Laterality: N/A;  2 P.M. START    REPEAT ABDOMINAL PARACENTESIS N/A 2/18/2021    Procedure: PARACENTESIS, ABDOMINAL, REPEAT;  Surgeon: Dosc Diagnostic Provider;  Location: [facility] OR;  Service: Radiology;  Laterality: N/A;  230PM START    REPEAT ABDOMINAL PARACENTESIS N/A 2/26/2021    Procedure: PARACENTESIS, ABDOMINAL, REPEAT;  Surgeon: Dosc Diagnostic Provider;  [facility];  Service: Radiology;  Laterality: N/A;     REPEAT ABDOMINAL PARACENTESIS N/A 3/4/2021    Procedure: PARACENTESIS, ABDOMINAL, REPEAT;  Surgeon: Dosc Diagnostic Provider;  Location: [facility] OR;  Service: Radiology;  Laterality: N/A;  9AM START    REPEAT ABDOMINAL PARACENTESIS N/A 3/12/2021    Procedure: PARACENTESIS, ABDOMINAL, REPEAT;  Surgeon: Dosc Diagnostic Provider;  Location: [facility] OR;  Service: Radiology;  Laterality: N/A;  10:00 START TIME  NO PRE-OP REQUIRED    REPEAT ABDOMINAL PARACENTESIS N/A 4/7/2021    Procedure: PARACENTESIS, ABDOMINAL, REPEAT;  Surgeon: Dosc Diagnostic Provider;  Location: [facility] OR;  Service: Radiology;  Laterality: N/A;  1 P.M. START    REPEAT ABDOMINAL PARACENTESIS N/A 4/16/2021    Procedure: PARACENTESIS, ABDOMINAL, REPEAT;  Surgeon: Dos Diagnostic Provider;  Location: [facility] OR;  Service: Radiology;  Laterality: N/A;  9:OO START  NO PRE-OP REQ'D    REPEAT ABDOMINAL PARACENTESIS N/A 4/26/2021    Procedure: PARACENTESIS, ABDOMINAL, REPEAT;  Surgeon: Dos Diagnostic Provider;  Location: [facility] OR;  Service: Radiology;  Laterality: N/A;  9AM START    REPEAT ABDOMINAL PARACENTESIS N/A 5/6/2021    Procedure: PARACENTESIS, ABDOMINAL, REPEAT;  Surgeon: Dos Diagnostic Provider;  Location: [facility] OR;  Service: Radiology;  Laterality: N/A;  3PM START    REPEAT ABDOMINAL PARACENTESIS N/A 5/14/2021    Procedure: PARACENTESIS, ABDOMINAL, REPEAT;  Surgeon: Dosc Diagnostic Provider;  Location: [facility] OR;  Service: Radiology;  Laterality: N/A;  1:00PM START  NO PRE-OP REQ'D    REPEAT ABDOMINAL PARACENTESIS N/A 5/24/2021    Procedure: PARACENTESIS, ABDOMINAL, REPEAT;  Surgeon: Dosc Diagnostic Provider;  Location: [facility] OR;  Service: Radiology;  Laterality: N/A;  2 P.M START TIME    REPEAT ABDOMINAL PARACENTESIS N/A 6/4/2021    Procedure: PARACENTESIS, ABDOMINAL, REPEAT;  Surgeon: Dosc Diagnostic Provider;  Location: [facility] OR;  Service: Radiology;  Laterality: N/A;  11AM START    REPEAT ABDOMINAL PARACENTESIS N/A 6/14/2021    Procedure:  PARACENTESIS, ABDOMINAL, REPEAT;  Surgeon: [facility] Diagnostic Provider;  Location: [facility] OR;  Service: Radiology;  Laterality: N/A;  8AM START    REPEAT ABDOMINAL PARACENTESIS N/A 6/23/2021    Procedure: PARACENTESIS, ABDOMINAL, REPEAT;  Surgeon: [facility] Diagnostic Provider;  Location: [facility] OR;  Service: Radiology;  Laterality: N/A;    REVISION OF PROCEDURE INVOLVING GLAUCOMA DRAINAGE DEVICE Left 10/2/2019    EXTRUDING BAERVELDT /WITH PERICARDIAL PATCH GRAFT ()    Right foot surgery  10/2014    TOE AMPUTATION Right     first and second    TOE AMPUTATION Left 11/16/2018    Procedure: AMPUTATION, TOES  2-5;  Surgeon: Mitch Chan MD;  Location: [facility] OR;  Service: Vascular;  Laterality: Left;    TOE AMPUTATION Left 12/5/2018    Procedure: AMPUTATION, TOE;  Surgeon: Mitch Chan MD;  Location: [facility] OR;  Service: Vascular;  Laterality: Left;  left great toe    TONSILLECTOMY       Home Meds:   Prior to Admission medications    Medication Sig Start Date End Date Taking? Authorizing Provider   acetaminophen (TYLENOL) 325 MG tablet Take 650 mg by mouth every 6 (six) hours as needed for Pain.    Historical Provider   allopurinoL (ZYLOPRIM) 300 MG tablet Take 1 tablet (300 mg total) by mouth once daily. 9/28/21   Rubén Davis MD   aspirin (ECOTRIN) 81 MG EC tablet Take 81 mg by mouth once daily.    Historical Provider   brimonidine 0.2% (ALPHAGAN) 0.2 % Drop Place 1 drop into both eyes 3 (three) times daily. 12/16/21   Perla Cortés MD   carvediloL (COREG) 3.125 MG tablet TAKE 1 TABLET(3.125 MG) BY MOUTH TWICE DAILY 1/4/22   Rubén Davis MD   clopidogreL (PLAVIX) 75 mg tablet Take 1 tablet (75 mg total) by mouth once daily. 1/14/21 1/14/22  Mitch Chan MD   coenzyme Q10 100 mg capsule Take 100 mg by mouth every morning.    Historical Provider   collagenase (SANTYL) ointment Apply topically to diabetic ulcer on lower right leg once daily. 4/13/21   Rubén Davis MD  "  dorzolamide-timolol 2-0.5% (COSOPT) 22.3-6.8 mg/mL ophthalmic solution Place 1 drop into both eyes 3 (three) times daily. 12/16/21   Perla Cortés MD   ezetimibe (ZETIA) 10 mg tablet TAKE 1 TABLET(10 MG) BY MOUTH EVERY DAY 12/30/21   Rubén Davis MD   fish oil-omega-3 fatty acids 300-1,000 mg capsule Take 1 capsule by mouth 2 (two) times daily. 1/23/19   Sara Ching MD   gabapentin (NEURONTIN) 100 MG capsule TAKE 2 CAPSULES(200 MG) BY MOUTH EVERY EVENING 10/8/21   Rubén Davis MD   HYDROcodone-acetaminophen (NORCO) 5-325 mg per tablet Take 1 tablet by mouth every 12 (twelve) hours as needed for Pain. 9/21/21   Rubén Davis MD   insulin degludec-liraglutide (XULTOPHY 100/3.6) 100 unit-3.6 mg /mL (3 mL) Pen Inject 35 Units into the skin once daily. 3/10/21   Sheryl Madison NP   loratadine (CLARITIN) 10 mg tablet Take 10 mg by mouth daily as needed for Allergies.    Historical Provider   metOLazone (ZAROXOLYN) 2.5 MG tablet TAKE 1 TABLET(2.5 MG) BY MOUTH EVERY DAY 11/30/21   Rubén Davis MD   MULTIVIT,THER IRON,CA,FA & MIN (MULTIVITAMIN) Tab Take 1 tablet by mouth every morning.     Historical Provider   pen needle, diabetic (BD ULTRA-FINE AMADOR PEN NEEDLE) 32 gauge x 5/32" Ndle 1 Device by Misc.(Non-Drug; Combo Route) route 2 (two) times a day. 9/11/20   Sheryl Madison NP   torsemide (DEMADEX) 20 MG Tab TAKE 4 TABLETS(80 MG) BY MOUTH TWICE DAILY 1/2/22   Rubén Davis MD   carvediloL (COREG) 3.125 MG tablet TAKE 1 TABLET(3.125 MG) BY MOUTH TWICE DAILY 5/24/21 1/4/22  Rubén Davis MD     Anticoagulants/Antiplatelets: no anticoagulation    Allergies:   Review of patient's allergies indicates:   Allergen Reactions    Statins-hmg-coa reductase inhibitors      Generalized Pain    Onglyza [saxagliptin]     Penicillins Rash     Sedation History:  no adverse reactions     Review of Systems:   Hematological: no known coagulopathies  Respiratory: positive for - orthopnea and shortness of " breath  Cardiovascular: positive for - dyspnea on exertion, orthopnea and shortness of breath  Gastrointestinal: positive for - abdominal pain and distension  Genito-Urinary: no dysuria, trouble voiding, or hematuria  Musculoskeletal: negative  Neurological: no TIA or stroke symptoms      OBJECTIVE:     Vital Signs (Most Recent)     Physical Exam:  General: no acute distress  Mental Status: alert and oriented to person, place and time  HEENT: normocephalic, atraumatic  Chest: mild labored breathing  Heart: regular heart rate  Abdomen: +distended with +fluid-wave. No TTP/r/g.  Extremity: moves all extremities    Laboratory  Lab Results   Component Value Date    INR 1.0 08/05/2021       Lab Results   Component Value Date    WBC 4.58 12/14/2021    HGB 14.1 12/14/2021    HCT 44.3 12/14/2021    MCV 96 12/14/2021     12/14/2021      Lab Results   Component Value Date     (H) 12/14/2021     12/14/2021    K 3.5 12/14/2021    CL 98 12/14/2021    CO2 32 (H) 12/14/2021    BUN 59 (H) 12/14/2021    CREATININE 1.3 12/14/2021    CALCIUM 8.6 (L) 12/14/2021    MG 2.1 05/31/2019    ALT 18 12/14/2021    AST 20 12/14/2021    ALBUMIN 2.7 (L) 12/14/2021    BILITOT 0.4 12/14/2021     ASSESSMENT/PLAN:     60 y/o M with CKD III and combined systolic/diastolic CHF complicated by recurrent abdominal distension and pain 2/2 recurrent painful, tense ascites requiring frequent therapeutic large-volume paracentesis.        1. Recurrent painful, tense ascites - Will attempt US-guided therapeutic percutaneous RUQ-approacch paracentesis with local anesthetic only and albumin infusion post PRN as indicated per institutional protocol.      Risks (including, but not limited to, pain, bleeding, infection, damage to nearby structures, failure to obtain sufficient material for a diagnosis, the need for additional procedures, and death), benefits, and alternatives were discussed with the patient. All questions were answered to the best  of my abilities. The patient wishes to proceed with the procedure. Written informed consent was obtained.     Thank you for considering IR for the care of your patient.      Zach Cha MD  Interventional Radiology

## 2022-01-06 NOTE — BRIEF OP NOTE
Radiology Post-Procedure Note     Pre Op Diagnosis: Recurrent painful, tense ascites  Post Op Diagnosis: Same     Procedure: 1. US-guided percutaneous RUQ-approach therapeutic LVP     Procedure performed by: Zach Cha MD     Written Informed Consent Obtained: Yes  Specimen Removed: YES, 4,400-L of slightly cloudy, serous fluid  Estimated Blood Loss: none     Findings:   Successful US-guided percutaneous RUQ-approach therapeutic LVP with local anesthetic only and albumin infusion post PRN as indicated per institutional protocol. Patient tolerated the procedure well. No immediate post-procedural complications noted.      Thank you for considering IR for the care of your patient.      Zach Cha MD  Interventional Radiology

## 2022-01-11 NOTE — PROGRESS NOTES
[facility]                Progress Note    Subjective:       Patient ID: Marvin Ray is a 61 y.o. male.    Chief Complaint: No chief complaint on file.    Patient self transferred from wheelchair to exam chair with minimal assistance. RLE with prosthetic left intact. LLE appears swollen, but no new breakdown to leg. Left Distal Foot Diabetic Ulcer unchanged. Left Medial Foot Diabetic Ulcer with some epithelial tissue to edges of wound on plantar part of foot so measuring a little smaller. Note vulnerable periwound area to medial foot that could breakdown if drainage not managed properly. Strike-though drainage from Medial/Plantar Foot. Sister-In-Law reports increased exercise during last Physical Therapy session that may have increased drainage more than normal. Has been alternating medihoney/drawtex with collagen/drawtex for a 3 day/3day schedule. Patient to get tapped for fluid this Thursday. Taking diuretics as well in between that time and found that amount tapped has been steadily decreasing.       Review of Systems   Constitutional: Negative.    HENT: Negative.    Eyes: Negative.    Respiratory: Negative.    Cardiovascular: Positive for leg swelling.   Gastrointestinal: Negative.    Skin: Positive for wound.   Psychiatric/Behavioral: Negative.          Objective:        Physical Exam  Constitutional:       Appearance: Normal appearance.   HENT:      Head: Normocephalic and atraumatic.      Right Ear: External ear normal.      Left Ear: External ear normal.      Mouth/Throat:      Mouth: Mucous membranes are moist.      Pharynx: Oropharynx is clear.   Eyes:      Extraocular Movements: Extraocular movements intact.      Conjunctiva/sclera: Conjunctivae normal.      Pupils: Pupils are equal, round, and reactive to light.   Cardiovascular:      Rate and Rhythm: Normal rate and regular rhythm.   Pulmonary:      Effort: Pulmonary effort is normal. No respiratory  distress.   Abdominal:      General: There is no distension.      Palpations: Abdomen is soft.   Skin:     General: Skin is warm and dry.      Comments: +DFU to left foot improving and less edematous that normal.  No slough requiring debridement   Neurological:      Mental Status: He is alert and oriented to person, place, and time. Mental status is at baseline.         Vitals:    01/11/22 1029   BP: 134/79   Pulse: 81   Temp: 97.3 °F (36.3 °C)       Assessment:           ICD-10-CM ICD-9-CM   1. Wound of left foot  S91.302A 892.0   2. PVD (peripheral vascular disease)  I73.9 443.9   3. DM type 2 with diabetic peripheral neuropathy  E11.42 250.60     357.2            Wound 12/22/20 1608 Diabetic Ulcer Left distal Foot (Active)   12/22/20 1608    Pre-existing:    Primary Wound Type: Diabetic ulc   Side: Left   Orientation: distal   Location: Foot   Wound Number:    Ankle-Brachial Index:    Pulses:    Removal Indication and Assessment:    Wound Outcome:    (Retired) Wound Type:    (Retired) Wound Length (cm):    (Retired) Wound Width (cm):    (Retired) Depth (cm):    Wound Description (Comments):    Removal Indications:    Wound Image   01/11/22 1000   Wound WDL ex 01/11/22 1000   Dressing Appearance Moist drainage 01/11/22 1000   Drainage Amount Moderate 01/11/22 1000   Drainage Characteristics/Odor Creamy;Yellow 01/11/22 1000   Appearance Red;Pink 01/11/22 1000   Tissue loss description Partial thickness 01/11/22 1000   Red (%), Wound Tissue Color 100 % 01/11/22 1000   Periwound Area Scar tissue;Dry 01/11/22 1000   Wound Edges Open 01/11/22 1000   Marshall Classification (diabetic foot ulcers only) Grade 1 01/11/22 1000   Wound Length (cm) 2 cm 01/11/22 1000   Wound Width (cm) 6 cm 01/11/22 1000   Wound Depth (cm) 0.1 cm 01/11/22 1000   Wound Volume (cm^3) 1.2 cm^3 01/11/22 1000   Wound Surface Area (cm^2) 12 cm^2 01/11/22 1000   Care Cleansed with:;Soap and water;Sterile normal saline 01/11/22 1000   Dressing Changed  01/11/22 1000   Periwound Care Moisture barrier applied 01/11/22 1000   Off Loading Football dressing 01/11/22 1000            Wound 12/22/20 1305 Diabetic Ulcer Left medial Foot (Active)   12/22/20 1305    Pre-existing:    Primary Wound Type: Diabetic ulc   Side: Left   Orientation: medial   Location: Foot   Wound Number:    Ankle-Brachial Index:    Pulses:    Removal Indication and Assessment:    Wound Outcome:    (Retired) Wound Type:    (Retired) Wound Length (cm):    (Retired) Wound Width (cm):    (Retired) Depth (cm):    Wound Description (Comments):    Removal Indications:    Wound Image   01/11/22 1000   Wound WDL ex 01/11/22 1000   Dressing Appearance Moist drainage;Saturated 01/11/22 1000   Drainage Amount Large 01/11/22 1000   Drainage Characteristics/Odor Serosanguineous;Yellow;Creamy 01/11/22 1000   Appearance Red;Pink;Epithelialization 01/11/22 1000   Tissue loss description Partial thickness 01/11/22 1000   Red (%), Wound Tissue Color 100 % 01/11/22 1000   Periwound Area Scar tissue;Dry 01/11/22 1000   Wound Edges Open 01/11/22 1000   Marshall Classification (diabetic foot ulcers only) Grade 1 01/11/22 1000   Wound Length (cm) 5.9 cm 01/11/22 1000   Wound Width (cm) 4.8 cm 01/11/22 1000   Wound Depth (cm) 0.2 cm 01/11/22 1000   Wound Volume (cm^3) 5.664 cm^3 01/11/22 1000   Wound Surface Area (cm^2) 28.32 cm^2 01/11/22 1000   Care Cleansed with:;Soap and water;Sterile normal saline 01/11/22 1000   Dressing Changed 01/11/22 1000   Periwound Care Moisture barrier applied 01/11/22 1000   Off Loading Football dressing 01/11/22 1000           Plan:                Orders Placed This Encounter   Procedures    Change dressing     Clean with saline. Apply gentian violet periwound. Promogran to wound beds. Cover with drawtex, mextra long x 2, cast padding x 2, and stockinet. Change on Thursday at home and continue to change every other day or as needed with therapy sessions increasing drainage. (Ok to alt  medihoney and collagen with drawtex)        Follow up in about 2 weeks (around 1/25/2022) for wound care.     Rubén Davis MD

## 2022-01-13 NOTE — BRIEF OP NOTE
Radiology Post-Procedure Note     Pre Op Diagnosis: Recurrent painful, tense ascites  Post Op Diagnosis: Same     Procedure: 1. US-guided percutaneous RLQ-approach therapeutic LVP     Procedure performed by: Zach Cha MD     Written Informed Consent Obtained: Yes  Specimen Removed: YES, 5,200-L of slightly cloudy, serous fluid  Estimated Blood Loss: none     Findings:   Successful US-guided percutaneous RLQ-approach therapeutic LVP with local anesthetic only and albumin infusion post PRN as indicated per institutional protocol. Patient tolerated the procedure well. No immediate post-procedural complications noted.      Thank you for considering IR for the care of your patient.      Zach Cha MD  Interventional Radiology

## 2022-01-13 NOTE — H&P
Radiology History & Physical      SUBJECTIVE:     Chief Complaint: Recurrent painful, tense ascites      History of Present Illness:  Marvin Ray is a 61 y.o. male with PMHx of CKD III and combined systolic/diastolic CHF complicated by recurrent abdominal distension and pain 2/2 recurrent painful, tense ascites requiring frequent  therapeutic large-volume paracentesis.      A new outpatient IR consult placed for US-guided therapeutic percutaneous RLQ-approach LVP.    Past Medical History:   Diagnosis Date    Arthritis     Cellulitis     CKD (chronic kidney disease), stage III     Coronary artery disease     Diabetes mellitus     Diabetic retinopathy     Diabetic ulcer of left foot     Glaucoma     Gout     Hyperlipemia     Hypertension     ICD (implantable cardioverter-defibrillator) in place 11/02/2018    Left chest    Non-pressure chronic ulcer of other part of left foot with fat layer exposed 10/23/2018    PVD (peripheral vascular disease)     Type 2 diabetes mellitus with left diabetic foot ulcer 10/29/2018    Unsteady gait     uses a wheelchair     Past Surgical History:   Procedure Laterality Date    AHMED GLAUCOMA IMPLANT Left 2011    DONE AT [facility]    AORTOGRAPHY WITH SERIALOGRAPHY Left 4/30/2019    Procedure: AORTOGRAM, WITH SERIALOGRAPHY, LEFT LOWER EXTREMITY, POSSIBLE INTERVENTION;  Surgeon: Mitch Chan MD;  Location: [facility] OR;  Service: Vascular;  Laterality: Left;  RN PRE OP 4-23-19.  NO H & P, No Cosent  [place]    AORTOGRAPHY WITH SERIALOGRAPHY Right 10/6/2020    Procedure: AORTOGRAM, WITH SERIALOGRAPHY, RIGHT LOWER EXTREMITY ANGIOGRAM, POSSIBLE INTERVENTION;  Surgeon: Mitch Chan MD;  Location: [facility] OR;  Service: Vascular;  Laterality: Right;  RN PREOP 10/1/2020--COVID NEGATIVE ON 10/5    AORTOGRAPHY WITH SERIALOGRAPHY Right 1/13/2021    Procedure: AORTOGRAM, WITH RIGHT LOWER EXTREMITY ANGIOGRAM, POSSIBLE INTERVENTION;  Surgeon: Mitch Chan MD;   Location: [facility] OR;  Service: Vascular;  Laterality: Right;  1PM START----NEED CONSENT  RN PREOP 1/8/2021--COVID NEGATIVE ON 1/12    AORTOGRAPHY WITH SERIALOGRAPHY Right 1/26/2021    Procedure: AORTOGRAM, RIGHT LOWER EXTREMITY ANGIOGRAM, POSSIBLE INTERVENTION;  Surgeon: Mitch Chan MD;  Location: [facility] OR;  Service: Vascular;  Laterality: Right;  RN PREOP 1/25/2021, COVID NEGATIVE ON 1/25, cxr, and ecg done    AORTOGRAPHY WITH SERIALOGRAPHY Right 3/30/2021    Procedure: AORTOGRAM, WITH RIGHT LOWER EXTREMITY ANGIOGRAM, POSSIBLE INTERVENTION;  Surgeon: Mitch Chan MD;  Location: [facility] OR;  Service: Vascular;  Laterality: Right;  RN PREOP 3/26/2021  COVID NEGATIVE    BAERVELDT GLAUCOMA IMPLANT Left 2012    WITH CATARACT EXTRACTION//DONE AT [facility]    BELOW KNEE AMPUTATION OF LOWER EXTREMITY Right 5/17/2021    Procedure: AMPUTATION, BELOW KNEE;  Surgeon: Christiana Pritchett MD;  Location: [facility] OR;  Service: Orthopedics;  Laterality: Right;  WOUND VAC -PREVENA  SUPINE---HAS--ICD- Iast interrogation -3/21  AMPUTATION TRAY  1ST CASE OKWHITNEY WHITLOCK  RN PREOP 5/13/2021     COVID --NEGATIVE ON 5/14---INCOMPLETE H/P    BIOPSY Right 12/18/2020    Procedure: Biopsy- trocar biopsy;  Surgeon: Brianna Champion DPM;  Location: [facility] OR;  Service: Podiatry;  Laterality: Right;    CARDIAC CATHETERIZATION Left 05/2016    CARDIAC DEFIBRILLATOR PLACEMENT Left 11/02/2018    CATARACT EXTRACTION W/  INTRAOCULAR LENS IMPLANT Left 2012    WITH BAERVEDT (DONE AT [facility])    CATARACT EXTRACTION W/  INTRAOCULAR LENS IMPLANT Right 09/26/2018    COMPLEX ()    CB DESTRUCTION WITH CYCLO G6 Left 02/15/2017        CYST REMOVAL      DEBRIDEMENT OF FOOT  12/28/2018    Procedure: DEBRIDEMENT, FOOT;  Surgeon: Mitch Chan MD;  Location: [facility] OR;  Service: Vascular;;    DEBRIDEMENT OF FOOT  6/5/2019    Procedure: DEBRIDEMENT, FOOT;  Surgeon: Mitch Chan MD;  Location: [facility] OR;  Service:  Vascular;;    DEBRIDEMENT OF FOOT Right 3/5/2021    Procedure: DEBRIDEMENT, FOOT with Misonic device;  Surgeon: Mitch Wall MD;  Location: [facility] OR;  Service: Vascular;  Laterality: Right;    EXAMINATION UNDER ANESTHESIA Left 12/5/2018    Procedure: Exam under anesthesia, left foot debridement, washout and all other indicated procedures;  Surgeon: Mitch Wall MD;  Location: [facility] OR;  Service: Vascular;  Laterality: Left;  1030AM START  RN PREOP 12/3/2018-----AICD  SEEN BY DR JAMES 12/4    EXAMINATION UNDER ANESTHESIA Left 2/13/2019    Procedure: LEFT FOOT WOUND DEBRIDEMENT, WASHOUT AND ALL OTHER INDICATED PROCEDURES;  Surgeon: Mitch Wall MD;  Location: [facility] OR;  Service: Vascular;  Laterality: Left;  requesting 1st case start  THIS CASE 1ST PER DR WALL ON 2/1/19 @ 844AM -LO  RN PREOP 2/6/2019  HAS CARDIAC CLEARANCE    EXAMINATION UNDER ANESTHESIA Left 12/28/2018    Procedure: Exam under anesthesia, left foot debridement, left foot washout, possible left second through fifth metatarsal resection;  Surgeon: Mitch Wall MD;  Location: [facility] OR;  Service: Vascular;  Laterality: Left;  1:00pm start per julissa @ 8:58am  RN  PHONE PRE OP 12-27-18--=Pt coming [facility]  NEED CONSENT    EXAMINATION UNDER ANESTHESIA Left 6/5/2019    Procedure: LEFT FOOT DEBRIDEMENT, WASHOUT AND ALL OTHER INDICATED PROCEDURES;  Surgeon: Mitch Wall MD;  Location: [facility] OR;  Service: Vascular;  Laterality: Left;  RN PRE OP 5-31-19------ICD------NEED CONSENT    EXAMINATION UNDER ANESTHESIA Right 11/9/2020    Procedure: RIGHT FOOT DEBRIDEMENT, WASHOUT AND ALL OTHER INDICATED PROCEDURES;  Surgeon: Mitch Wall MD;  Location: [facility] OR;  Service: Vascular;  Laterality: Right;  RN PREOP 10/27/2020, --COVID NEGATIVE ON 11/6, has cardiac clearance/[place] 10/27/2020, pt has ICD  NEEDS ORDERS    EXAMINATION UNDER ANESTHESIA Right 2/4/2021    Procedure: RIGHT FOOT DEBRIDEMENT,  POSSIBLE CALCANECTOMY, POSSIBLE FIFTH TOE AMPUTATION, WASHOUT AND ALL OTHER INDICATED PROCEDURES;  Surgeon: Mitch Chan MD;  Location: [facility] OR;  Service: Vascular;  Laterality: Right;  RN PREOP 2/2/2021--COVID NEGATIVE ON 2/2  ICD    EYE SURGERY      CAT EXT OU    FOOT AMPUTATION THROUGH METATARSAL Right 3/5/2021    Procedure: Right foot wound debridement, possible transmetatarsal amputation, possible fifth toe amputation, washout;  Surgeon: Mitch Chan MD;  Location: [facility] OR;  Service: Vascular;  Laterality: Right;  MISONIX SONIC ONE JAYDEN LOUISE [phone] SPOKE TO HIM ON MY OFFICE @ 7:31AM ON 2-  RN PRE Op, Covid NEGATIVE ON  3-2-21.  C A  8:30AM START----NEED H/P    INCISION AND DRAINAGE Left 11/14/2018    Procedure: Incision and Drainage, left lower extremity debridement, washout;  Surgeon: Mitch Chan MD;  Location: [facility] OR;  Service: Vascular;  Laterality: Left;    INCISION AND DRAINAGE Left 11/16/2018    Procedure: INCISION AND DRAINAGE;  Surgeon: Mitch Chan MD;  Location: [facility] OR;  Service: Vascular;  Laterality: Left;    INCISION AND DRAINAGE Right 11/18/2020    Procedure: Debridement and washout right lower extremity wounds;  Surgeon: Mitch Chan MD;  Location: [facility] OR 2ND FLR;  Service: Vascular;  Laterality: Right;    INCISION AND DRAINAGE Right 1/27/2021    Procedure: RIGHT FOOT DEBRIDEMENT, WASHOUT AND ALL OTHER INDICATED PROCEDURES;  Surgeon: Mitch Chan MD;  Location: [facility] OR 2ND FLR;  Service: Vascular;  Laterality: Right;    INTRAOCULAR PROSTHESES INSERTION Right 9/26/2018    Procedure: INSERTION, IOL PROSTHESIS;  Surgeon: Perla Cortés MD;  Location: [facility] OR 1ST FLR;  Service: Ophthalmology;  Laterality: Right;    PERITONEOCENTESIS N/A 3/1/2019    Procedure: PARACENTESIS, ABDOMINAL, INITIAL;  Surgeon: [facility] Diagnostic Provider;  Location: [facility] OR;  Service: Radiology;  Laterality: N/A;    PERITONEOCENTESIS N/A  3/25/2019    Procedure: PARACENTESIS, ABDOMINAL, INITIAL;  Surgeon: Dosc Diagnostic Provider;  Location: [facility] OR;  Service: Radiology;  Laterality: N/A;    PERITONEOCENTESIS N/A 7/22/2019    Procedure: PARACENTESIS, ABDOMINAL, INITIAL;  Surgeon: Dosc Diagnostic Provider;  Location: [facility] OR;  Service: Radiology;  Laterality: N/A;    PERITONEOCENTESIS N/A 8/16/2019    Procedure: PARACENTESIS, ABDOMINAL, INITIAL;  Surgeon: Dosc Diagnostic Provider;  [facility]: [facility];  Service: Radiology;  Laterality: N/A;    PERITONEOCENTESIS N/A 9/26/2019    Procedure: PARACENTESIS, ABDOMINAL;  Surgeon: Dosc Diagnostic Provider;  [facility];  Service: Radiology;  Laterality: N/A;    PERITONEOCENTESIS N/A 10/11/2019    Procedure: PARACENTESIS, ABDOMINAL;  Surgeon: Dosc Diagnostic Provider;  [facility];  Service: Radiology;  Laterality: N/A;    PERITONEOCENTESIS N/A 10/31/2019    Procedure: PARACENTESIS, ABDOMINAL;  Surgeon: Dosc Diagnostic Provider;  [facility];  Service: Radiology;  Laterality: N/A;    PERITONEOCENTESIS N/A 11/18/2019    Procedure: PARACENTESIS, ABDOMINAL;  Surgeon: Dosc Diagnostic Provider;  [facility];  Service: Radiology;  Laterality: N/A;    PERITONEOCENTESIS N/A 12/16/2019    Procedure: PARACENTESIS, ABDOMINAL;  Surgeon: Dosc Diagnostic Provider;  [facility];  Service: Radiology;  Laterality: N/A;  2PM START    PERITONEOCENTESIS N/A 2/7/2020    Procedure: PARACENTESIS, ABDOMINAL;  Surgeon: Dosc Diagnostic Provider;  [facility];  Service: Radiology;  Laterality: N/A;  REQUESTED 10:30A START    PERITONEOCENTESIS N/A 2/19/2020    Procedure: PARACENTESIS, ABDOMINAL;  Surgeon: Dosc Diagnostic Provider;  [facility];  Service: Radiology;  Laterality: N/A;    PERITONEOCENTESIS N/A 2/28/2020    Procedure: PARACENTESIS, ABDOMINAL;  Surgeon: Dosc Diagnostic Provider;  Location: [facility] OR;  Service: Radiology;  Laterality: N/A;  REQUESTED 10AM START     PHACOEMULSIFICATION OF CATARACT Right 9/26/2018    Procedure: PHACOEMULSIFICATION, CATARACT;  Surgeon: Perla Cortés MD;  Location: [facility] OR [address] [place] [address];  Service: Ophthalmology;  Laterality: Right;    REPEAT ABDOMINAL PARACENTESIS N/A 2/11/2019    Procedure: PARACENTESIS, ABDOMINAL, REPEAT;  Surgeon: Dosc Diagnostic Provider;  Location: [facility] OR;  Service: Radiology;  Laterality: N/A;  1PM START    REPEAT ABDOMINAL PARACENTESIS N/A 9/12/2019    Procedure: PARACENTESIS, ABDOMINAL, REPEAT;  Surgeon: Dosc Diagnostic Provider;  Location: [facility] OR;  Service: Radiology;  Laterality: N/A;  9AM START    REPEAT ABDOMINAL PARACENTESIS N/A 12/2/2019    Procedure: PARACENTESIS, ABDOMINAL, REPEAT;  Surgeon: Dosc Diagnostic Provider;  Location: [facility] OR;  Service: Radiology;  Laterality: N/A;  3PM START    REPEAT ABDOMINAL PARACENTESIS N/A 1/10/2020    Procedure: PARACENTESIS, ABDOMINAL, REPEAT;  Surgeon: Dosc Diagnostic Provider;  Location: [facility] OR;  Service: Radiology;  Laterality: N/A;  1130AM START    REPEAT ABDOMINAL PARACENTESIS N/A 1/20/2020    Procedure: PARACENTESIS, ABDOMINAL, REPEAT;  Surgeon: Dosc Diagnostic Provider;  Location: [facility] OR;  Service: Radiology;  Laterality: N/A;  1PM START    REPEAT ABDOMINAL PARACENTESIS N/A 1/31/2020    Procedure: PARACENTESIS, ABDOMINAL, REPEAT;  Surgeon: Dosc Diagnostic Provider;  Location: [facility] OR;  Service: Radiology;  Laterality: N/A;  10AM START    REPEAT ABDOMINAL PARACENTESIS N/A 3/16/2020    Procedure: PARACENTESIS, ABDOMINAL, REPEAT;  Surgeon: Dosc Diagnostic Provider;  Location: [facility] OR;  Service: Radiology;  Laterality: N/A;  10AM START    REPEAT ABDOMINAL PARACENTESIS N/A 3/30/2020    Procedure: PARACENTESIS, ABDOMINAL, REPEAT;  Surgeon: Dosc Diagnostic Provider;  Location: [facility] OR;  Service: Radiology;  Laterality: N/A;    REPEAT ABDOMINAL PARACENTESIS N/A 4/9/2020    Procedure: PARACENTESIS, ABDOMINAL, REPEAT;  Surgeon: Dosc Diagnostic Provider;  Location: [facility]  OR;  Service: Radiology;  Laterality: N/A;  1130AM START    REPEAT ABDOMINAL PARACENTESIS N/A 5/8/2020    Procedure: PARACENTESIS, ABDOMINAL, REPEAT;  Surgeon: Dosc Diagnostic Provider;  Location: [facility] OR;  Service: Radiology;  Laterality: N/A;  9AM START    REPEAT ABDOMINAL PARACENTESIS N/A 5/21/2020    Procedure: PARACENTESIS, ABDOMINAL, REPEAT;  Surgeon: Dosc Diagnostic Provider;  Location: [facility] OR;  Service: Radiology;  Laterality: N/A;  10AM START    REPEAT ABDOMINAL PARACENTESIS N/A 6/5/2020    Procedure: PARACENTESIS, ABDOMINAL, REPEAT;  Surgeon: Dosc Diagnostic Provider;  Location: [facility] OR;  Service: Radiology;  Laterality: N/A;  NOON START    REPEAT ABDOMINAL PARACENTESIS N/A 7/10/2020    Procedure: PARACENTESIS, ABDOMINAL, REPEAT;  Surgeon: Dosc Diagnostic Provider;  Location: [facility] OR;  Service: Radiology;  Laterality: N/A;  1PM START    REPEAT ABDOMINAL PARACENTESIS N/A 8/20/2020    Procedure: PARACENTESIS, ABDOMINAL, REPEAT;  Surgeon: Dosc Diagnostic Provider;  Location: [facility] OR;  Service: Radiology;  Laterality: N/A;  1PM START    REPEAT ABDOMINAL PARACENTESIS N/A 9/4/2020    Procedure: PARACENTESIS, ABDOMINAL, REPEAT;  Surgeon: Dosc Diagnostic Provider;  Location: [facility] OR;  Service: Radiology;  Laterality: N/A;  11 AM START    REPEAT ABDOMINAL PARACENTESIS N/A 9/16/2020    Procedure: PARACENTESIS, ABDOMINAL, REPEAT;  Surgeon: Dosc Diagnostic Provider;  Location: [facility] OR;  Service: Radiology;  Laterality: N/A;  START 2:00 P.M.    REPEAT ABDOMINAL PARACENTESIS N/A 9/28/2020    Procedure: PARACENTESIS, ABDOMINAL, REPEAT;  Surgeon: Dosc Diagnostic Provider;  Location: [facility] OR;  Service: Radiology;  Laterality: N/A;  1 P.M. START    REPEAT ABDOMINAL PARACENTESIS N/A 11/3/2020    Procedure: PARACENTESIS, ABDOMINAL, REPEAT;  Surgeon: Dosc Diagnostic Provider;  Location: [facility] OR;  Service: Radiology;  Laterality: N/A;  11AM START    REPEAT ABDOMINAL PARACENTESIS N/A 11/13/2020    Procedure:  PARACENTESIS, ABDOMINAL, REPEAT;  Surgeon: Dosc Diagnostic Provider;  Location: [facility] OR;  Service: Radiology;  Laterality: N/A;  12PM START    REPEAT ABDOMINAL PARACENTESIS N/A 11/23/2020    Procedure: PARACENTESIS, ABDOMINAL, REPEAT;  Surgeon: Dosc Diagnostic Provider;  [facility];  Service: Radiology;  Laterality: N/A;    REPEAT ABDOMINAL PARACENTESIS N/A 12/1/2020    Procedure: PARACENTESIS, ABDOMINAL, REPEAT;  Surgeon: Dosc Diagnostic Provider;  Location: [facility] OR;  Service: Radiology;  Laterality: N/A;  11AM START    REPEAT ABDOMINAL PARACENTESIS N/A 12/8/2020    Procedure: PARACENTESIS, ABDOMINAL, REPEAT;  Surgeon: Dosc Diagnostic Provider;  Location: [facility] OR;  Service: Radiology;  Laterality: N/A;  93AM START    REPEAT ABDOMINAL PARACENTESIS N/A 12/17/2020    Procedure: PARACENTESIS, ABDOMINAL, REPEAT;  Surgeon: Dosc Diagnostic Provider;  Location: [facility] OR;  Service: Radiology;  Laterality: N/A;  10AM START    REPEAT ABDOMINAL PARACENTESIS N/A 12/30/2020    Procedure: PARACENTESIS, ABDOMINAL, REPEAT;  Surgeon: Dosc Diagnostic Provider;  Location: [facility] OR;  Service: Radiology;  Laterality: N/A;  9 A.M. START    REPEAT ABDOMINAL PARACENTESIS N/A 1/12/2021    Procedure: PARACENTESIS, ABDOMINAL, REPEAT;  Surgeon: Dos Diagnostic Provider;  Location: [facility] OR;  Service: Radiology;  Laterality: N/A;  10 AM START    REPEAT ABDOMINAL PARACENTESIS N/A 2/10/2021    Procedure: PARACENTESIS, ABDOMINAL, REPEAT;  Surgeon: Dosc Diagnostic Provider;  Location: [facility] OR;  Service: Radiology;  Laterality: N/A;  2 P.M. START    REPEAT ABDOMINAL PARACENTESIS N/A 2/18/2021    Procedure: PARACENTESIS, ABDOMINAL, REPEAT;  Surgeon: Dosc Diagnostic Provider;  Location: [facility] OR;  Service: Radiology;  Laterality: N/A;  230PM START    REPEAT ABDOMINAL PARACENTESIS N/A 2/26/2021    Procedure: PARACENTESIS, ABDOMINAL, REPEAT;  Surgeon: Dosc Diagnostic Provider;  [facility];  Service: Radiology;  Laterality: N/A;     REPEAT ABDOMINAL PARACENTESIS N/A 3/4/2021    Procedure: PARACENTESIS, ABDOMINAL, REPEAT;  Surgeon: Dosc Diagnostic Provider;  Location: [facility] OR;  Service: Radiology;  Laterality: N/A;  9AM START    REPEAT ABDOMINAL PARACENTESIS N/A 3/12/2021    Procedure: PARACENTESIS, ABDOMINAL, REPEAT;  Surgeon: Dosc Diagnostic Provider;  Location: [facility] OR;  Service: Radiology;  Laterality: N/A;  10:00 START TIME  NO PRE-OP REQUIRED    REPEAT ABDOMINAL PARACENTESIS N/A 4/7/2021    Procedure: PARACENTESIS, ABDOMINAL, REPEAT;  Surgeon: Dosc Diagnostic Provider;  Location: [facility] OR;  Service: Radiology;  Laterality: N/A;  1 P.M. START    REPEAT ABDOMINAL PARACENTESIS N/A 4/16/2021    Procedure: PARACENTESIS, ABDOMINAL, REPEAT;  Surgeon: Dos Diagnostic Provider;  Location: [facility] OR;  Service: Radiology;  Laterality: N/A;  9:OO START  NO PRE-OP REQ'D    REPEAT ABDOMINAL PARACENTESIS N/A 4/26/2021    Procedure: PARACENTESIS, ABDOMINAL, REPEAT;  Surgeon: Dos Diagnostic Provider;  Location: [facility] OR;  Service: Radiology;  Laterality: N/A;  9AM START    REPEAT ABDOMINAL PARACENTESIS N/A 5/6/2021    Procedure: PARACENTESIS, ABDOMINAL, REPEAT;  Surgeon: Dos Diagnostic Provider;  Location: [facility] OR;  Service: Radiology;  Laterality: N/A;  3PM START    REPEAT ABDOMINAL PARACENTESIS N/A 5/14/2021    Procedure: PARACENTESIS, ABDOMINAL, REPEAT;  Surgeon: Dosc Diagnostic Provider;  Location: [facility] OR;  Service: Radiology;  Laterality: N/A;  1:00PM START  NO PRE-OP REQ'D    REPEAT ABDOMINAL PARACENTESIS N/A 5/24/2021    Procedure: PARACENTESIS, ABDOMINAL, REPEAT;  Surgeon: Dosc Diagnostic Provider;  Location: [facility] OR;  Service: Radiology;  Laterality: N/A;  2 P.M START TIME    REPEAT ABDOMINAL PARACENTESIS N/A 6/4/2021    Procedure: PARACENTESIS, ABDOMINAL, REPEAT;  Surgeon: Dosc Diagnostic Provider;  Location: [facility] OR;  Service: Radiology;  Laterality: N/A;  11AM START    REPEAT ABDOMINAL PARACENTESIS N/A 6/14/2021    Procedure:  PARACENTESIS, ABDOMINAL, REPEAT;  Surgeon: Winston Diagnostic Provider;  Location: [facility] OR;  Service: Radiology;  Laterality: N/A;  8AM START    REPEAT ABDOMINAL PARACENTESIS N/A 6/23/2021    Procedure: PARACENTESIS, ABDOMINAL, REPEAT;  Surgeon: [facility] Diagnostic Provider;  Location: [facility] OR;  Service: Radiology;  Laterality: N/A;    REVISION OF PROCEDURE INVOLVING GLAUCOMA DRAINAGE DEVICE Left 10/2/2019    EXTRUDING BAERVELDT /WITH PERICARDIAL PATCH GRAFT ()    Right foot surgery  10/2014    TOE AMPUTATION Right     first and second    TOE AMPUTATION Left 11/16/2018    Procedure: AMPUTATION, TOES  2-5;  Surgeon: Mitch Chan MD;  Location: [facility] OR;  Service: Vascular;  Laterality: Left;    TOE AMPUTATION Left 12/5/2018    Procedure: AMPUTATION, TOE;  Surgeon: Mitch Chan MD;  Location: [facility] OR;  Service: Vascular;  Laterality: Left;  left great toe    TONSILLECTOMY       Home Meds:   Prior to Admission medications    Medication Sig Start Date End Date Taking? Authorizing Provider   acetaminophen (TYLENOL) 325 MG tablet Take 650 mg by mouth every 6 (six) hours as needed for Pain.    Historical Provider   allopurinoL (ZYLOPRIM) 300 MG tablet Take 1 tablet (300 mg total) by mouth once daily. 9/28/21   Rubén Davis MD   aspirin (ECOTRIN) 81 MG EC tablet Take 81 mg by mouth once daily.    Historical Provider   brimonidine 0.2% (ALPHAGAN) 0.2 % Drop INSTILL 1 DROP IN BOTH EYES THREE TIMES DAILY 1/9/22   Perla Cortés MD   carvediloL (COREG) 3.125 MG tablet TAKE 1 TABLET(3.125 MG) BY MOUTH TWICE DAILY 1/4/22   Rubén Davis MD   clopidogreL (PLAVIX) 75 mg tablet Take 1 tablet (75 mg total) by mouth once daily. 1/14/21 1/14/22  Mitch Chan MD   coenzyme Q10 100 mg capsule Take 100 mg by mouth every morning.    Historical Provider   collagenase (SANTYL) ointment Apply topically to diabetic ulcer on lower right leg once daily. 4/13/21   Rubén Davis MD  "  dorzolamide-timolol 2-0.5% (COSOPT) 22.3-6.8 mg/mL ophthalmic solution Place 1 drop into both eyes 3 (three) times daily. 12/16/21   Perla Cortés MD   ezetimibe (ZETIA) 10 mg tablet TAKE 1 TABLET(10 MG) BY MOUTH EVERY DAY 12/30/21   Rubén Davis MD   fish oil-omega-3 fatty acids 300-1,000 mg capsule Take 1 capsule by mouth 2 (two) times daily. 1/23/19   Sara Ching MD   gabapentin (NEURONTIN) 100 MG capsule TAKE 2 CAPSULES(200 MG) BY MOUTH EVERY EVENING 10/8/21   Rubén Davis MD   HYDROcodone-acetaminophen (NORCO) 5-325 mg per tablet Take 1 tablet by mouth every 12 (twelve) hours as needed for Pain. 9/21/21   Rubén Davis MD   insulin degludec-liraglutide (XULTOPHY 100/3.6) 100 unit-3.6 mg /mL (3 mL) Pen Inject 35 Units into the skin once daily. 3/10/21   Sheryl [facility] NP   loratadine (CLARITIN) 10 mg tablet Take 10 mg by mouth daily as needed for Allergies.    Historical Provider   metOLazone (ZAROXOLYN) 2.5 MG tablet TAKE 1 TABLET(2.5 MG) BY MOUTH EVERY DAY 11/30/21   Rubén Davis MD   MULTIVIT,THER IRON,CA,FA & MIN (MULTIVITAMIN) Tab Take 1 tablet by mouth every morning.     Historical Provider   pen needle, diabetic (BD ULTRA-FINE AMADOR PEN NEEDLE) 32 gauge x 5/32" Ndle 1 Device by Misc.(Non-Drug; Combo Route) route 2 (two) times a day. 9/11/20   Sheryl Madison NP   torsemide (DEMADEX) 20 MG Tab TAKE 4 TABLETS(80 MG) BY MOUTH TWICE DAILY 1/2/22   Rubén Davis MD     Anticoagulants/Antiplatelets: no anticoagulation    Allergies:   Review of patient's allergies indicates:   Allergen Reactions    Statins-hmg-coa reductase inhibitors      Generalized Pain    Onglyza [saxagliptin]     Penicillins Rash     Sedation History:  no adverse reactions     Review of Systems:   Hematological: no known coagulopathies  Respiratory: positive for - orthopnea and shortness of breath  Cardiovascular: positive for - dyspnea on exertion, orthopnea and shortness of breath  Gastrointestinal: positive " for - abdominal pain and distension  Genito-Urinary: no dysuria, trouble voiding, or hematuria  Musculoskeletal: negative  Neurological: no TIA or stroke symptoms      OBJECTIVE:     Vital Signs (Most Recent)     Physical Exam:  General: no acute distress  Mental Status: alert and oriented to person, place and time  HEENT: normocephalic, atraumatic  Chest: mild labored breathing  Heart: regular heart rate  Abdomen: +distended with +fluid-wave. No TTP/r/g.  Extremity: moves all extremities    Laboratory  Lab Results   Component Value Date    INR 1.0 08/05/2021       Lab Results   Component Value Date    WBC 4.58 12/14/2021    HGB 14.1 12/14/2021    HCT 44.3 12/14/2021    MCV 96 12/14/2021     12/14/2021      Lab Results   Component Value Date     (H) 12/14/2021     12/14/2021    K 3.5 12/14/2021    CL 98 12/14/2021    CO2 32 (H) 12/14/2021    BUN 59 (H) 12/14/2021    CREATININE 1.3 12/14/2021    CALCIUM 8.6 (L) 12/14/2021    MG 2.1 05/31/2019    ALT 18 12/14/2021    AST 20 12/14/2021    ALBUMIN 2.7 (L) 12/14/2021    BILITOT 0.4 12/14/2021     ASSESSMENT/PLAN:     60 y/o M with CKD III and combined systolic/diastolic CHF complicated by recurrent abdominal distension and pain 2/2 recurrent painful, tense ascites requiring frequent therapeutic large-volume paracentesis.        1. Recurrent painful, tense ascites - Will attempt US-guided therapeutic percutaneous RLQ-approacch paracentesis with local anesthetic only and albumin infusion post PRN as indicated per institutional protocol.      Risks (including, but not limited to, pain, bleeding, infection, damage to nearby structures, failure to obtain sufficient material for a diagnosis, the need for additional procedures, and death), benefits, and alternatives were discussed with the patient. All questions were answered to the best of my abilities. The patient wishes to proceed with the procedure. Written informed consent was obtained.     Thank you  for considering IR for the care of your patient.      Zach Cha MD  Interventional Radiology

## 2022-01-13 NOTE — DISCHARGE SUMMARY
Radiology Discharge Summary      Hospital Course: No complications    Admit Date: 1/13/2022  Discharge Date: 01/13/2022     Instructions Given to Patient: Yes  Diet: Resume prior diet  Activity: activity as tolerated    Description of Condition on Discharge: Stable  Vital Signs (Most Recent): BP: 136/73 (01/13/22 1016)    Discharge Disposition: Home    Discharge Diagnosis:   60 y/o M with h/o recurrent painful, tense ascites s/p successful US-guided percutaneous RLQ-approach therapeutic LVP with local anesthetic only and albumin infusion post as indicated per institutional protocol. Patient tolerated the procedures well. No immediate post-procedural complications noted.      Thank you for considering IR for the care of your patient.      Zach Cha MD  Interventional Radiology
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
## 2022-01-18 NOTE — PATIENT INSTRUCTIONS
To apply for [facility] Financial Assistance, call us [phone]    Improve dietary, medication, and testing adherence.   Test glucose 2x/day.   Return to clinic in 3 months with labs prior.

## 2022-01-20 NOTE — H&P
Radiology History & Physical      SUBJECTIVE:     Chief Complaint: Recurrent painful, tense ascites      History of Present Illness:  Marvin Ray is a 61 y.o. male with PMHx of CKD III and combined systolic/diastolic CHF complicated by recurrent abdominal distension and pain 2/2 recurrent painful, tense ascites requiring frequent  therapeutic large-volume paracentesis.      A new outpatient IR consult placed for US-guided therapeutic percutaneous RLQ-approach LVP.    Past Medical History:   Diagnosis Date    Arthritis     Cellulitis     CKD (chronic kidney disease), stage III     Coronary artery disease     Diabetes mellitus     Diabetic retinopathy     Diabetic ulcer of left foot     Glaucoma     Gout     Hyperlipemia     Hypertension     ICD (implantable cardioverter-defibrillator) in place 11/02/2018    Left chest    Non-pressure chronic ulcer of other part of left foot with fat layer exposed 10/23/2018    PVD (peripheral vascular disease)     Type 2 diabetes mellitus with left diabetic foot ulcer 10/29/2018    Unsteady gait     uses a wheelchair     Past Surgical History:   Procedure Laterality Date    AHMED GLAUCOMA IMPLANT Left 2011    [facility]ONE AT [facility]    AORTOGRAPHY WITH SERIALOGRAPHY Left 4/30/2019    Procedure: AORTOGRAM, WITH SERIALOGRAPHY, LEFT LOWER EXTREMITY, POSSIBLE INTERVENTION;  Surgeon: Mitch Chan MD;  Location: [facility] OR;  Service: Vascular;  Laterality: Left;  RN PRE OP 4-23-19.  NO H & P, No Cosent  [place]    AORTOGRAPHY WITH SERIALOGRAPHY Right 10/6/2020    Procedure: AORTOGRAM, WITH SERIALOGRAPHY, RIGHT LOWER EXTREMITY ANGIOGRAM, POSSIBLE INTERVENTION;  Surgeon: Mitch Chan MD;  Location: [facility] OR;  Service: Vascular;  Laterality: Right;  RN PREOP 10/1/2020--COVID NEGATIVE ON 10/5    AORTOGRAPHY WITH SERIALOGRAPHY Right 1/13/2021    Procedure: AORTOGRAM, WITH RIGHT LOWER EXTREMITY ANGIOGRAM, POSSIBLE INTERVENTION;  Surgeon: Mitch Chan MD;   Location: [facility] OR;  Service: Vascular;  Laterality: Right;  1PM START----NEED CONSENT  RN PREOP 1/8/2021--COVID NEGATIVE ON 1/12    AORTOGRAPHY WITH SERIALOGRAPHY Right 1/26/2021    Procedure: AORTOGRAM, RIGHT LOWER EXTREMITY ANGIOGRAM, POSSIBLE INTERVENTION;  Surgeon: Mitch Chan MD;  Location: [facility] OR;  Service: Vascular;  Laterality: Right;  RN PREOP 1/25/2021, COVID NEGATIVE ON 1/25, cxr, and ecg done    AORTOGRAPHY WITH SERIALOGRAPHY Right 3/30/2021    Procedure: AORTOGRAM, WITH RIGHT LOWER EXTREMITY ANGIOGRAM, POSSIBLE INTERVENTION;  Surgeon: Mitch Chan MD;  Location: [facility] OR;  Service: Vascular;  Laterality: Right;  RN PREOP 3/26/2021  COVID NEGATIVE    BAERVELDT GLAUCOMA IMPLANT Left 2012    WITH CATARACT EXTRACTION//DONE AT [facility]    BELOW KNEE AMPUTATION OF LOWER EXTREMITY Right 5/17/2021    Procedure: AMPUTATION, BELOW KNEE;  Surgeon: Christiana Pritchett MD;  Location: [facility] OR;  Service: Orthopedics;  Laterality: Right;  WOUND VAC -PREVENA  SUPINE---HAS--ICD- Iast interrogation -3/21  AMPUTATION TRAY  1ST CASE OKWHITNEY WHITLOCK  RN PREOP 5/13/2021     COVID --NEGATIVE ON 5/14---INCOMPLETE H/P    BIOPSY Right 12/18/2020    Procedure: Biopsy- trocar biopsy;  Surgeon: Brianna Champion DPM;  Location: [facility] OR;  Service: Podiatry;  Laterality: Right;    CARDIAC CATHETERIZATION Left 05/2016    CARDIAC DEFIBRILLATOR PLACEMENT Left 11/02/2018    CATARACT EXTRACTION W/  INTRAOCULAR LENS IMPLANT Left 2012    WITH BAERVEDT (DONE AT [facility])    CATARACT EXTRACTION W/  INTRAOCULAR LENS IMPLANT Right 09/26/2018    COMPLEX ()    CB DESTRUCTION WITH CYCLO G6 Left 02/15/2017        CYST REMOVAL      DEBRIDEMENT OF FOOT  12/28/2018    Procedure: DEBRIDEMENT, FOOT;  Surgeon: Mitch Chan MD;  Location: [facility] OR;  Service: Vascular;;    DEBRIDEMENT OF FOOT  6/5/2019    Procedure: DEBRIDEMENT, FOOT;  Surgeon: Mitch Chan MD;  Location: [facility] OR;  Service:  Vascular;;    DEBRIDEMENT OF FOOT Right 3/5/2021    Procedure: DEBRIDEMENT, FOOT with Misonic device;  Surgeon: Mitch Wall MD;  Location: [facility] OR;  Service: Vascular;  Laterality: Right;    EXAMINATION UNDER ANESTHESIA Left 12/5/2018    Procedure: Exam under anesthesia, left foot debridement, washout and all other indicated procedures;  Surgeon: Mitch Wall MD;  Location: [facility] OR;  Service: Vascular;  Laterality: Left;  1030AM START  RN PREOP 12/3/2018-----AICD  SEEN BY DR JAMES 12/4    EXAMINATION UNDER ANESTHESIA Left 2/13/2019    Procedure: LEFT FOOT WOUND DEBRIDEMENT, WASHOUT AND ALL OTHER INDICATED PROCEDURES;  Surgeon: Mitch Wall MD;  Location: [facility] OR;  Service: Vascular;  Laterality: Left;  requesting 1st case start  THIS CASE 1ST PER DR WALL ON 2/1/19 @ 844AM -LO  RN PREOP 2/6/2019  HAS CARDIAC CLEARANCE    EXAMINATION UNDER ANESTHESIA Left 12/28/2018    Procedure: Exam under anesthesia, left foot debridement, left foot washout, possible left second through fifth metatarsal resection;  Surgeon: Mitch Wall MD;  Location: [facility] OR;  Service: Vascular;  Laterality: Left;  1:00pm start per julissa @ 8:58am  RN  PHONE PRE OP 12-27-18--=Pt coming from [facility]  NEED CONSENT    EXAMINATION UNDER ANESTHESIA Left 6/5/2019    Procedure: LEFT FOOT DEBRIDEMENT, WASHOUT AND ALL OTHER INDICATED PROCEDURES;  Surgeon: Mitch Wall MD;  Location: [facility] OR;  Service: Vascular;  Laterality: Left;  RN PRE OP 5-31-19------ICD------NEED CONSENT    EXAMINATION UNDER ANESTHESIA Right 11/9/2020    Procedure: RIGHT FOOT DEBRIDEMENT, WASHOUT AND ALL OTHER INDICATED PROCEDURES;  Surgeon: Mitch Wall MD;  Location: [facility] OR;  Service: Vascular;  Laterality: Right;  RN PREOP 10/27/2020, --COVID NEGATIVE ON 11/6, has cardiac clearance/[place] 10/27/2020, pt has ICD  NEEDS ORDERS    EXAMINATION UNDER ANESTHESIA Right 2/4/2021    Procedure: RIGHT FOOT DEBRIDEMENT,  POSSIBLE CALCANECTOMY, POSSIBLE FIFTH TOE AMPUTATION, WASHOUT AND ALL OTHER INDICATED PROCEDURES;  Surgeon: Mitch Chan MD;  Location: [facility] OR;  Service: Vascular;  Laterality: Right;  RN PREOP 2/2/2021--COVID NEGATIVE ON 2/2  ICD    EYE SURGERY      CAT EXT OU    FOOT AMPUTATION THROUGH METATARSAL Right 3/5/2021    Procedure: Right foot wound debridement, possible transmetatarsal amputation, possible fifth toe amputation, washout;  Surgeon: Mitch Chan MD;  Location: [facility] OR;  Service: Vascular;  Laterality: Right;  MISONIX SONIC ONE JAYDEN LOUISE [phone] SPOKE TO HIM ON MY OFFICE @ 7:31AM ON 2-  RN PRE Op, Covid NEGATIVE ON  3-2-21.  C A  8:30AM START----NEED H/P    INCISION AND DRAINAGE Left 11/14/2018    Procedure: Incision and Drainage, left lower extremity debridement, washout;  Surgeon: Mitch Chan MD;  Location: [facility] OR;  Service: Vascular;  Laterality: Left;    INCISION AND DRAINAGE Left 11/16/2018    Procedure: INCISION AND DRAINAGE;  Surgeon: Mitch Chan MD;  Location: [facility] OR;  Service: Vascular;  Laterality: Left;    INCISION AND DRAINAGE Right 11/18/2020    Procedure: Debridement and washout right lower extremity wounds;  Surgeon: Mitch Chan MD;  Location: [facility] OR 2ND FLR;  Service: Vascular;  Laterality: Right;    INCISION AND DRAINAGE Right 1/27/2021    Procedure: RIGHT FOOT DEBRIDEMENT, WASHOUT AND ALL OTHER INDICATED PROCEDURES;  Surgeon: Mitch Chan MD;  Location: [facility] OR 2ND FLR;  Service: Vascular;  Laterality: Right;    INTRAOCULAR PROSTHESES INSERTION Right 9/26/2018    Procedure: INSERTION, IOL PROSTHESIS;  Surgeon: Perla Cortés MD;  Location: [facility] OR 1ST FLR;  Service: Ophthalmology;  Laterality: Right;    PERITONEOCENTESIS N/A 3/1/2019    Procedure: PARACENTESIS, ABDOMINAL, INITIAL;  Surgeon: [facility] Diagnostic Provider;  Location: [facility] OR;  Service: Radiology;  Laterality: N/A;    PERITONEOCENTESIS N/A  3/25/2019    Procedure: PARACENTESIS, ABDOMINAL, INITIAL;  Surgeon: Dosc Diagnostic Provider;  [facility]: [facility] OR;  Service: Radiology;  Laterality: N/A;    PERITONEOCENTESIS N/A 7/22/2019    Procedure: PARACENTESIS, ABDOMINAL, INITIAL;  Surgeon: Dosc Diagnostic Provider;  Location: [facility] OR;  Service: Radiology;  Laterality: N/A;    PERITONEOCENTESIS N/A 8/16/2019    Procedure: PARACENTESIS, ABDOMINAL, INITIAL;  Surgeon: Dosc Diagnostic Provider;  Location: [facility] OR;  Service: Radiology;  Laterality: N/A;    PERITONEOCENTESIS N/A 9/26/2019    Procedure: PARACENTESIS, ABDOMINAL;  Surgeon: Dosc Diagnostic Provider;  [facility] OR;  Service: Radiology;  Laterality: N/A;    PERITONEOCENTESIS N/A 10/11/2019    Procedure: PARACENTESIS, ABDOMINAL;  Surgeon: Dosc Diagnostic Provider;  [facility]: [facility];  Service: Radiology;  Laterality: N/A;    PERITONEOCENTESIS N/A 10/31/2019    Procedure: PARACENTESIS, ABDOMINAL;  Surgeon: Dosc Diagnostic Provider;  [facility];  Service: Radiology;  Laterality: N/A;    PERITONEOCENTESIS N/A 11/18/2019    Procedure: PARACENTESIS, ABDOMINAL;  Surgeon: Dosc Diagnostic Provider;  [facility];  Service: Radiology;  Laterality: N/A;    PERITONEOCENTESIS N/A 12/16/2019    Procedure: PARACENTESIS, ABDOMINAL;  Surgeon: Dosc Diagnostic Provider;  [facility];  Service: Radiology;  Laterality: N/A;  2PM START    PERITONEOCENTESIS N/A 2/7/2020    Procedure: PARACENTESIS, ABDOMINAL;  Surgeon: Dosc Diagnostic Provider;  [facility]: [facility] OR;  Service: Radiology;  Laterality: N/A;  REQUESTED 10:30A START    PERITONEOCENTESIS N/A 2/19/2020    Procedure: PARACENTESIS, ABDOMINAL;  Surgeon: Dosc Diagnostic Provider;  [facility];  Service: Radiology;  Laterality: N/A;    PERITONEOCENTESIS N/A 2/28/2020    Procedure: PARACENTESIS, ABDOMINAL;  Surgeon: Dosc Diagnostic Provider;  Location: [facility] OR;  Service: Radiology;  Laterality: N/A;  REQUESTED 10AM START     PHACOEMULSIFICATION OF CATARACT Right 9/26/2018    Procedure: PHACOEMULSIFICATION, CATARACT;  Surgeon: Perla Cortés MD;  Location: [facility] OR [address] [place] [address];  Service: Ophthalmology;  Laterality: Right;    REPEAT ABDOMINAL PARACENTESIS N/A 2/11/2019    Procedure: PARACENTESIS, ABDOMINAL, REPEAT;  Surgeon: Dosc Diagnostic Provider;  Location: [facility] OR;  Service: Radiology;  Laterality: N/A;  1PM START    REPEAT ABDOMINAL PARACENTESIS N/A 9/12/2019    Procedure: PARACENTESIS, ABDOMINAL, REPEAT;  Surgeon: Dosc Diagnostic Provider;  Location: [facility] OR;  Service: Radiology;  Laterality: N/A;  9AM START    REPEAT ABDOMINAL PARACENTESIS N/A 12/2/2019    Procedure: PARACENTESIS, ABDOMINAL, REPEAT;  Surgeon: Dosc Diagnostic Provider;  Location: [facility] OR;  Service: Radiology;  Laterality: N/A;  3PM START    REPEAT ABDOMINAL PARACENTESIS N/A 1/10/2020    Procedure: PARACENTESIS, ABDOMINAL, REPEAT;  Surgeon: Dosc Diagnostic Provider;  Location: [facility] OR;  Service: Radiology;  Laterality: N/A;  1130AM START    REPEAT ABDOMINAL PARACENTESIS N/A 1/20/2020    Procedure: PARACENTESIS, ABDOMINAL, REPEAT;  Surgeon: Dosc Diagnostic Provider;  Location: [facility] OR;  Service: Radiology;  Laterality: N/A;  1PM START    REPEAT ABDOMINAL PARACENTESIS N/A 1/31/2020    Procedure: PARACENTESIS, ABDOMINAL, REPEAT;  Surgeon: Dosc Diagnostic Provider;  Location: [facility] OR;  Service: Radiology;  Laterality: N/A;  10AM START    REPEAT ABDOMINAL PARACENTESIS N/A 3/16/2020    Procedure: PARACENTESIS, ABDOMINAL, REPEAT;  Surgeon: Dosc Diagnostic Provider;  Location: [facility] OR;  Service: Radiology;  Laterality: N/A;  10AM START    REPEAT ABDOMINAL PARACENTESIS N/A 3/30/2020    Procedure: PARACENTESIS, ABDOMINAL, REPEAT;  Surgeon: Dosc Diagnostic Provider;  Location: [facility] OR;  Service: Radiology;  Laterality: N/A;    REPEAT ABDOMINAL PARACENTESIS N/A 4/9/2020    Procedure: PARACENTESIS, ABDOMINAL, REPEAT;  Surgeon: Dosc Diagnostic Provider;  Location: [facility]  OR;  Service: Radiology;  Laterality: N/A;  1130AM START    REPEAT ABDOMINAL PARACENTESIS N/A 5/8/2020    Procedure: PARACENTESIS, ABDOMINAL, REPEAT;  Surgeon: Dosc Diagnostic Provider;  Location: [facility] OR;  Service: Radiology;  Laterality: N/A;  9AM START    REPEAT ABDOMINAL PARACENTESIS N/A 5/21/2020    Procedure: PARACENTESIS, ABDOMINAL, REPEAT;  Surgeon: Dosc Diagnostic Provider;  Location: [facility] OR;  Service: Radiology;  Laterality: N/A;  10AM START    REPEAT ABDOMINAL PARACENTESIS N/A 6/5/2020    Procedure: PARACENTESIS, ABDOMINAL, REPEAT;  Surgeon: Dosc Diagnostic Provider;  Location: [facility] OR;  Service: Radiology;  Laterality: N/A;  NOON START    REPEAT ABDOMINAL PARACENTESIS N/A 7/10/2020    Procedure: PARACENTESIS, ABDOMINAL, REPEAT;  Surgeon: Dosc Diagnostic Provider;  Location: [facility] OR;  Service: Radiology;  Laterality: N/A;  1PM START    REPEAT ABDOMINAL PARACENTESIS N/A 8/20/2020    Procedure: PARACENTESIS, ABDOMINAL, REPEAT;  Surgeon: Dosc Diagnostic Provider;  Location: [facility] OR;  Service: Radiology;  Laterality: N/A;  1PM START    REPEAT ABDOMINAL PARACENTESIS N/A 9/4/2020    Procedure: PARACENTESIS, ABDOMINAL, REPEAT;  Surgeon: Dosc Diagnostic Provider;  Location: [facility] OR;  Service: Radiology;  Laterality: N/A;  11 AM START    REPEAT ABDOMINAL PARACENTESIS N/A 9/16/2020    Procedure: PARACENTESIS, ABDOMINAL, REPEAT;  Surgeon: Dosc Diagnostic Provider;  Location: [facility] OR;  Service: Radiology;  Laterality: N/A;  START 2:00 P.M.    REPEAT ABDOMINAL PARACENTESIS N/A 9/28/2020    Procedure: PARACENTESIS, ABDOMINAL, REPEAT;  Surgeon: Dosc Diagnostic Provider;  Location: [facility] OR;  Service: Radiology;  Laterality: N/A;  1 P.M. START    REPEAT ABDOMINAL PARACENTESIS N/A 11/3/2020    Procedure: PARACENTESIS, ABDOMINAL, REPEAT;  Surgeon: Dosc Diagnostic Provider;  Location: [facility] OR;  Service: Radiology;  Laterality: N/A;  11AM START    REPEAT ABDOMINAL PARACENTESIS N/A 11/13/2020    Procedure:  PARACENTESIS, ABDOMINAL, REPEAT;  Surgeon: Dosc Diagnostic Provider;  Location: [facility] OR;  Service: Radiology;  Laterality: N/A;  12PM START    REPEAT ABDOMINAL PARACENTESIS N/A 11/23/2020    Procedure: PARACENTESIS, ABDOMINAL, REPEAT;  Surgeon: Dosc Diagnostic Provider;  Location: [facility] OR;  Service: Radiology;  Laterality: N/A;    REPEAT ABDOMINAL PARACENTESIS N/A 12/1/2020    Procedure: PARACENTESIS, ABDOMINAL, REPEAT;  Surgeon: Dosc Diagnostic Provider;  Location: [facility] OR;  Service: Radiology;  Laterality: N/A;  11AM START    REPEAT ABDOMINAL PARACENTESIS N/A 12/8/2020    Procedure: PARACENTESIS, ABDOMINAL, REPEAT;  Surgeon: Dosc Diagnostic Provider;  Location: [facility] OR;  Service: Radiology;  Laterality: N/A;  93AM START    REPEAT ABDOMINAL PARACENTESIS N/A 12/17/2020    Procedure: PARACENTESIS, ABDOMINAL, REPEAT;  Surgeon: Dosc Diagnostic Provider;  Location: [facility] OR;  Service: Radiology;  Laterality: N/A;  10AM START    REPEAT ABDOMINAL PARACENTESIS N/A 12/30/2020    Procedure: PARACENTESIS, ABDOMINAL, REPEAT;  Surgeon: Dosc Diagnostic Provider;  Location: [facility] OR;  Service: Radiology;  Laterality: N/A;  9 A.M. START    REPEAT ABDOMINAL PARACENTESIS N/A 1/12/2021    Procedure: PARACENTESIS, ABDOMINAL, REPEAT;  Surgeon: Dos Diagnostic Provider;  Location: [facility] OR;  Service: Radiology;  Laterality: N/A;  10 AM START    REPEAT ABDOMINAL PARACENTESIS N/A 2/10/2021    Procedure: PARACENTESIS, ABDOMINAL, REPEAT;  Surgeon: Dosc Diagnostic Provider;  Location: [facility] OR;  Service: Radiology;  Laterality: N/A;  2 P.M. START    REPEAT ABDOMINAL PARACENTESIS N/A 2/18/2021    Procedure: PARACENTESIS, ABDOMINAL, REPEAT;  Surgeon: Dosc Diagnostic Provider;  Location: [facility] OR;  Service: Radiology;  Laterality: N/A;  230PM START    REPEAT ABDOMINAL PARACENTESIS N/A 2/26/2021    Procedure: PARACENTESIS, ABDOMINAL, REPEAT;  Surgeon: Dosc Diagnostic Provider;  [facility];  Service: Radiology;  Laterality: N/A;     REPEAT ABDOMINAL PARACENTESIS N/A 3/4/2021    Procedure: PARACENTESIS, ABDOMINAL, REPEAT;  Surgeon: Dosc Diagnostic Provider;  Location: [facility] OR;  Service: Radiology;  Laterality: N/A;  9AM START    REPEAT ABDOMINAL PARACENTESIS N/A 3/12/2021    Procedure: PARACENTESIS, ABDOMINAL, REPEAT;  Surgeon: Dosc Diagnostic Provider;  Location: [facility] OR;  Service: Radiology;  Laterality: N/A;  10:00 START TIME  NO PRE-OP REQUIRED    REPEAT ABDOMINAL PARACENTESIS N/A 4/7/2021    Procedure: PARACENTESIS, ABDOMINAL, REPEAT;  Surgeon: Dosc Diagnostic Provider;  Location: [facility] OR;  Service: Radiology;  Laterality: N/A;  1 P.M. START    REPEAT ABDOMINAL PARACENTESIS N/A 4/16/2021    Procedure: PARACENTESIS, ABDOMINAL, REPEAT;  Surgeon: Dos Diagnostic Provider;  Location: [facility] OR;  Service: Radiology;  Laterality: N/A;  9:OO START  NO PRE-OP REQ'D    REPEAT ABDOMINAL PARACENTESIS N/A 4/26/2021    Procedure: PARACENTESIS, ABDOMINAL, REPEAT;  Surgeon: Dos Diagnostic Provider;  Location: [facility] OR;  Service: Radiology;  Laterality: N/A;  9AM START    REPEAT ABDOMINAL PARACENTESIS N/A 5/6/2021    Procedure: PARACENTESIS, ABDOMINAL, REPEAT;  Surgeon: Dos Diagnostic Provider;  Location: [facility] OR;  Service: Radiology;  Laterality: N/A;  3PM START    REPEAT ABDOMINAL PARACENTESIS N/A 5/14/2021    Procedure: PARACENTESIS, ABDOMINAL, REPEAT;  Surgeon: Dosc Diagnostic Provider;  Location: [facility] OR;  Service: Radiology;  Laterality: N/A;  1:00PM START  NO PRE-OP REQ'D    REPEAT ABDOMINAL PARACENTESIS N/A 5/24/2021    Procedure: PARACENTESIS, ABDOMINAL, REPEAT;  Surgeon: Dosc Diagnostic Provider;  Location: [facility] OR;  Service: Radiology;  Laterality: N/A;  2 P.M START TIME    REPEAT ABDOMINAL PARACENTESIS N/A 6/4/2021    Procedure: PARACENTESIS, ABDOMINAL, REPEAT;  Surgeon: Dosc Diagnostic Provider;  Location: [facility] OR;  Service: Radiology;  Laterality: N/A;  11AM START    REPEAT ABDOMINAL PARACENTESIS N/A 6/14/2021    Procedure:  PARACENTESIS, ABDOMINAL, REPEAT;  Surgeon: [facility] Diagnostic Provider;  Location: [facility] OR;  Service: Radiology;  Laterality: N/A;  8AM START    REPEAT ABDOMINAL PARACENTESIS N/A 6/23/2021    Procedure: PARACENTESIS, ABDOMINAL, REPEAT;  Surgeon: [facility] Diagnostic Provider;  Location: [facility] OR;  Service: Radiology;  Laterality: N/A;    REVISION OF PROCEDURE INVOLVING GLAUCOMA DRAINAGE DEVICE Left 10/2/2019    EXTRUDING BAERVELDT /WITH PERICARDIAL PATCH GRAFT ()    Right foot surgery  10/2014    TOE AMPUTATION Right     first and second    TOE AMPUTATION Left 11/16/2018    Procedure: AMPUTATION, TOES  2-5;  Surgeon: Mitch Chan MD;  Location: [facility] OR;  Service: Vascular;  Laterality: Left;    TOE AMPUTATION Left 12/5/2018    Procedure: AMPUTATION, TOE;  Surgeon: Mitch Chan MD;  Location: [facility] OR;  Service: Vascular;  Laterality: Left;  left great toe    TONSILLECTOMY       Home Meds:   Prior to Admission medications    Medication Sig Start Date End Date Taking? Authorizing Provider   acetaminophen (TYLENOL) 325 MG tablet Take 650 mg by mouth every 6 (six) hours as needed for Pain.    Historical Provider   allopurinoL (ZYLOPRIM) 300 MG tablet Take 1 tablet (300 mg total) by mouth once daily. 9/28/21   Rubén Davis MD   aspirin (ECOTRIN) 81 MG EC tablet Take 81 mg by mouth once daily.    Historical Provider   blood sugar diagnostic Strp To check BG 2 times daily, to use with insurance preferred meter. e11.65 1/18/22   Sheryl Madison NP   blood-glucose meter kit To check BG 2 times daily, to use with insurance preferred meter 1/18/22 1/18/23  Sheryl Madison NP   brimonidine 0.2% (ALPHAGAN) 0.2 % Drop INSTILL 1 DROP IN BOTH EYES THREE TIMES DAILY 1/9/22   Perla Cortés MD   carvediloL (COREG) 3.125 MG tablet TAKE 1 TABLET(3.125 MG) BY MOUTH TWICE DAILY 1/4/22   Rubén Davis MD   clopidogreL (PLAVIX) 75 mg tablet Take 1 tablet (75 mg total) by mouth once daily. 1/14/21 1/14/22   "Mitch Chan MD   coenzyme Q10 100 mg capsule Take 100 mg by mouth every morning.    Historical Provider   collagenase (SANTYL) ointment Apply topically to diabetic ulcer on lower right leg once daily. 4/13/21   Rubén Davis MD   dorzolamide-timolol 2-0.5% (COSOPT) 22.3-6.8 mg/mL ophthalmic solution Place 1 drop into both eyes 3 (three) times daily. 12/16/21   Perla Cortés MD   ezetimibe (ZETIA) 10 mg tablet TAKE 1 TABLET(10 MG) BY MOUTH EVERY DAY 12/30/21   Rubén Davis MD   fish oil-omega-3 fatty acids 300-1,000 mg capsule Take 1 capsule by mouth 2 (two) times daily. 1/23/19   Sara Ching MD   gabapentin (NEURONTIN) 100 MG capsule TAKE 2 CAPSULES(200 MG) BY MOUTH EVERY EVENING 10/8/21   Rubén Daivs MD   HYDROcodone-acetaminophen (NORCO) 5-325 mg per tablet Take 1 tablet by mouth every 12 (twelve) hours as needed for Pain. 9/21/21   Rubén Davis MD   insulin degludec-liraglutide (XULTOPHY 100/3.6) 100 unit-3.6 mg /mL (3 mL) Pen Inject 35 Units into the skin once daily. 1/18/22   Sheryl Madison NP   lancets Misc To check BG 2 times daily, to use with insurance preferred meter. Dx code e11.65 1/18/22   Sheryl Madison NP   loratadine (CLARITIN) 10 mg tablet Take 10 mg by mouth daily as needed for Allergies.    Historical Provider   metOLazone (ZAROXOLYN) 2.5 MG tablet TAKE 1 TABLET(2.5 MG) BY MOUTH EVERY DAY 11/30/21   uRbén Davis MD   MULTIVIT,THER IRON,CA,FA & MIN (MULTIVITAMIN) Tab Take 1 tablet by mouth every morning.     Historical Provider   pen needle, diabetic (BD ULTRA-FINE AMADOR PEN NEEDLE) 32 gauge x 5/32" Ndle 1 Device by Misc.(Non-Drug; Combo Route) route 2 (two) times a day. 9/11/20   Sheryl Madison NP   torsemide (DEMADEX) 20 MG Tab TAKE 4 TABLETS(80 MG) BY MOUTH TWICE DAILY 1/2/22   Rubén Davis MD     Anticoagulants/Antiplatelets: no anticoagulation    Allergies:   Review of patient's allergies indicates:   Allergen Reactions    Statins-hmg-coa reductase " inhibitors      Generalized Pain    Onglyza [saxagliptin]     Penicillins Rash     Sedation History:  no adverse reactions     Review of Systems:   Hematological: no known coagulopathies  Respiratory: positive for - orthopnea and shortness of breath  Cardiovascular: positive for - dyspnea on exertion, orthopnea and shortness of breath  Gastrointestinal: positive for - abdominal pain and distension  Genito-Urinary: no dysuria, trouble voiding, or hematuria  Musculoskeletal: negative  Neurological: no TIA or stroke symptoms      OBJECTIVE:     Vital Signs (Most Recent)     Physical Exam:  General: no acute distress  Mental Status: alert and oriented to person, place and time  HEENT: normocephalic, atraumatic  Chest: mild labored breathing  Heart: regular heart rate  Abdomen: +distended with +fluid-wave. No TTP/r/g.  Extremity: moves all extremities    Laboratory  Lab Results   Component Value Date    INR 1.0 08/05/2021       Lab Results   Component Value Date    WBC 4.58 12/14/2021    HGB 14.1 12/14/2021    HCT 44.3 12/14/2021    MCV 96 12/14/2021     12/14/2021      Lab Results   Component Value Date     (H) 12/14/2021     12/14/2021    K 3.5 12/14/2021    CL 98 12/14/2021    CO2 32 (H) 12/14/2021    BUN 59 (H) 12/14/2021    CREATININE 1.3 12/14/2021    CALCIUM 8.6 (L) 12/14/2021    MG 2.1 05/31/2019    ALT 18 12/14/2021    AST 20 12/14/2021    ALBUMIN 2.7 (L) 12/14/2021    BILITOT 0.4 12/14/2021     ASSESSMENT/PLAN:     62 y/o M with CKD III and combined systolic/diastolic CHF complicated by recurrent abdominal distension and pain 2/2 recurrent painful, tense ascites requiring frequent therapeutic large-volume paracentesis.        1. Recurrent painful, tense ascites - Will attempt US-guided therapeutic percutaneous RLQ-approacch paracentesis with local anesthetic only and albumin infusion post PRN as indicated per institutional protocol.      Risks (including, but not limited to, pain,  bleeding, infection, damage to nearby structures, failure to obtain sufficient material for a diagnosis, the need for additional procedures, and death), benefits, and alternatives were discussed with the patient. All questions were answered to the best of my abilities. The patient wishes to proceed with the procedure. Written informed consent was obtained.     Thank you for considering IR for the care of your patient.      Zach Cha MD  Interventional Radiology

## 2022-01-20 NOTE — DISCHARGE SUMMARY
Radiology Discharge Summary      Hospital Course: No complications    Admit Date: 1/20/2022  Discharge Date: 01/20/2022     Instructions Given to Patient: Yes  Diet: Resume prior diet  Activity: activity as tolerated    Description of Condition on Discharge: Stable  Vital Signs (Most Recent): BP: 112/65 (01/20/22 1027)  SpO2: 96 % (01/20/22 1027)    Discharge Disposition: Home    Discharge Diagnosis:   62 y/o M with h/o recurrent painful, tense ascites s/p successful US-guided percutaneous RLQ-approach therapeutic LVP with local anesthetic only and albumin infusion post as indicated per institutional protocol. Patient tolerated the procedures well. No immediate post-procedural complications noted.      Thank you for considering IR for the care of your patient.      Zach Cha MD  Interventional Radiology

## 2022-01-25 NOTE — PROGRESS NOTES
[facility]                Progress Note    Subjective:       Patient ID: Marvin Ray is a 61 y.o. male.    Chief Complaint: No chief complaint on file.    Patient self-transferred from wheelchair to exam chair. Right BKA with prosthetic. Patient receiving physical therapy 3 x a week. Left Distal Foot Diabetic Ulcer dimensions overall the same, however there is evidence of a lot of new epithelial skin growth within wound bed. Left Medial Foot Diabetic Ulcer measuring much the same with some new yellowing that is likely related to collagen use. Patient's sister still alternating between collagen/AG alginate and medihoney when wounds appear yellow at home. This method is working so far. LLE swollen today. Patient getting tapped on Thursday. This always reduces swelling in LLE and helps the Left Medial Foot wound get smaller.       Review of Systems   Constitutional: Negative.    HENT: Negative.    Eyes: Negative.    Respiratory: Negative.    Cardiovascular: Positive for leg swelling.   Gastrointestinal: Negative.    Skin: Positive for wound.   Psychiatric/Behavioral: Negative.          Objective:        Physical Exam  Vitals reviewed.   Constitutional:       Appearance: Normal appearance.   HENT:      Head: Normocephalic and atraumatic.      Right Ear: External ear normal.      Left Ear: External ear normal.      Mouth/Throat:      Mouth: Mucous membranes are moist.      Pharynx: Oropharynx is clear.   Eyes:      Extraocular Movements: Extraocular movements intact.      Conjunctiva/sclera: Conjunctivae normal.      Pupils: Pupils are equal, round, and reactive to light.   Cardiovascular:      Rate and Rhythm: Normal rate and regular rhythm.   Pulmonary:      Effort: Pulmonary effort is normal. No respiratory distress.   Abdominal:      General: There is distension.      Palpations: Abdomen is soft. There is no mass.      Tenderness: There is no abdominal tenderness.    Skin:     General: Skin is warm and dry.      Comments: +DFU to left foot with dorsal wound improving; medial wound has excess slough; plantar wound improving as well   Neurological:      Mental Status: He is alert.         Vitals:    01/25/22 1020   BP: 103/61   Pulse: 83   Temp: 97.3 °F (36.3 °C)       Assessment:           ICD-10-CM ICD-9-CM   1. Type 2 diabetes mellitus with left diabetic foot ulcer  E11.621 250.80    L97.529 707.15   2. Wound of left foot  S91.302A 892.0            Wound 12/22/20 1608 Diabetic Ulcer Left distal Foot (Active)   12/22/20 1608    Pre-existing:    Primary Wound Type: Diabetic ulc   Side: Left   Orientation: distal   Location: Foot   Wound Number:    Ankle-Brachial Index:    Pulses:    Removal Indication and Assessment:    Wound Outcome:    (Retired) Wound Type:    (Retired) Wound Length (cm):    (Retired) Wound Width (cm):    (Retired) Depth (cm):    Wound Description (Comments):    Removal Indications:    Wound Image   01/25/22 1000   Wound WDL ex 01/25/22 1000   Dressing Appearance Dried drainage 01/25/22 1000   Drainage Amount Small 01/25/22 1000   Drainage Characteristics/Odor Serosanguineous 01/25/22 1000   Appearance Red;Pink;Epithelialization 01/25/22 1000   Tissue loss description Partial thickness 01/25/22 1000   Red (%), Wound Tissue Color 100 % 01/25/22 1000   Periwound Area Scar tissue 01/25/22 1000   Wound Edges Open;Irregular 01/25/22 1000   Marshall Classification (diabetic foot ulcers only) Grade 1 01/25/22 1000   Wound Length (cm) 2 cm 01/25/22 1000   Wound Width (cm) 6 cm 01/25/22 1000   Wound Depth (cm) 0.1 cm 01/25/22 1000   Wound Volume (cm^3) 1.2 cm^3 01/25/22 1000   Wound Surface Area (cm^2) 12 cm^2 01/25/22 1000   Care Cleansed with:;Soap and water;Sterile normal saline;Antimicrobial agent 01/25/22 1000   Dressing Changed 01/25/22 1000   Periwound Care Moisture barrier applied 01/25/22 1000   Off Loading Football dressing 01/25/22 1000            Wound  12/22/20 1305 Diabetic Ulcer Left medial Foot (Active)   12/22/20 1305    Pre-existing:    Primary Wound Type: Diabetic ulc   Side: Left   Orientation: medial   Location: Foot   Wound Number:    Ankle-Brachial Index:    Pulses:    Removal Indication and Assessment:    Wound Outcome:    (Retired) Wound Type:    (Retired) Wound Length (cm):    (Retired) Wound Width (cm):    (Retired) Depth (cm):    Wound Description (Comments):    Removal Indications:    Wound Image   01/25/22 1000   Wound WDL ex 01/25/22 1000   Dressing Appearance Dried drainage 01/25/22 1000   Drainage Amount Small 01/25/22 1000   Drainage Characteristics/Odor Serosanguineous 01/25/22 1000   Appearance Red;Yellow 01/25/22 1000   Tissue loss description Partial thickness 01/25/22 1000   Red (%), Wound Tissue Color 80 % 01/25/22 1000   Yellow (%), Wound Tissue Color 20 % 01/25/22 1000   Periwound Area Scar tissue 01/25/22 1000   Wound Edges Open 01/25/22 1000   Marshall Classification (diabetic foot ulcers only) Grade 1 01/25/22 1000   Wound Length (cm) 5.4 cm 01/25/22 1000   Wound Width (cm) 5.4 cm 01/25/22 1000   Wound Depth (cm) 0.2 cm 01/25/22 1000   Wound Volume (cm^3) 5.832 cm^3 01/25/22 1000   Wound Surface Area (cm^2) 29.16 cm^2 01/25/22 1000   Care Cleansed with:;Antimicrobial agent;Soap and water;Sterile normal saline 01/25/22 1000   Dressing Changed 01/25/22 1000   Periwound Care Moisture barrier applied 01/25/22 1000   Off Loading Football dressing 01/25/22 1000           Plan:                Orders Placed This Encounter   Procedures    Change dressing     Irrigate with saline and clean with vashe dampened gauze. Gentian violet periwound hugging open edges tightly. Endoform, AG Alginate to Left Distal Foot and Left Plantar Medial Foot. Medihoney to Left Medial Foot covered with gauze. ABD to distal/medial foot secured with cast padding football x 2 and stockinet. Darco. Change every 2 days per sister at home, or as needed if drainage  increases with strike-through or increased periwound maceration noted.        Follow up in about 3 weeks (around 2/15/2022) for wound care.     Rubén Davis MD

## 2022-01-27 NOTE — H&P
Radiology History & Physical      SUBJECTIVE:     Chief Complaint: Recurrent painful, tense ascites      History of Present Illness:  Marvin Ray is a 61 y.o. male with PMHx of CKD III and combined systolic/diastolic CHF complicated by recurrent abdominal distension and pain 2/2 recurrent painful, tense ascites requiring frequent  therapeutic large-volume paracentesis.      A new outpatient IR consult placed for US-guided therapeutic percutaneous RLQ-approach LVP.    Past Medical History:   Diagnosis Date    Arthritis     Cellulitis     CKD (chronic kidney disease), stage III     Coronary artery disease     Diabetes mellitus     Diabetic retinopathy     Diabetic ulcer of left foot     Glaucoma     Gout     Hyperlipemia     Hypertension     ICD (implantable cardioverter-defibrillator) in place 11/02/2018    Left chest    Non-pressure chronic ulcer of other part of left foot with fat layer exposed 10/23/2018    PVD (peripheral vascular disease)     Type 2 diabetes mellitus with left diabetic foot ulcer 10/29/2018    Unsteady gait     uses a wheelchair     Past Surgical History:   Procedure Laterality Date    AHMED GLAUCOMA IMPLANT Left 2011    DONE AT [facility]    AORTOGRAPHY WITH SERIALOGRAPHY Left 4/30/2019    Procedure: AORTOGRAM, WITH SERIALOGRAPHY, LEFT LOWER EXTREMITY, POSSIBLE INTERVENTION;  Surgeon: Mitch Chan MD;  Location: [facility] OR;  Service: Vascular;  Laterality: Left;  RN PRE OP 4-23-19.  NO H & P, No Cosent  [place]    AORTOGRAPHY WITH SERIALOGRAPHY Right 10/6/2020    Procedure: AORTOGRAM, WITH SERIALOGRAPHY, RIGHT LOWER EXTREMITY ANGIOGRAM, POSSIBLE INTERVENTION;  Surgeon: Mitch Chan MD;  Location: [facility] OR;  Service: Vascular;  Laterality: Right;  RN PREOP 10/1/2020--COVID NEGATIVE ON 10/5    AORTOGRAPHY WITH SERIALOGRAPHY Right 1/13/2021    Procedure: AORTOGRAM, WITH RIGHT LOWER EXTREMITY ANGIOGRAM, POSSIBLE INTERVENTION;  Surgeon: Mitch Chan MD;   Location: [facility] OR;  Service: Vascular;  Laterality: Right;  1PM START----NEED CONSENT  RN PREOP 1/8/2021--COVID NEGATIVE ON 1/12    AORTOGRAPHY WITH SERIALOGRAPHY Right 1/26/2021    Procedure: AORTOGRAM, RIGHT LOWER EXTREMITY ANGIOGRAM, POSSIBLE INTERVENTION;  Surgeon: Mitch Chan MD;  Location: [facility] OR;  Service: Vascular;  Laterality: Right;  RN PREOP 1/25/2021, COVID NEGATIVE ON 1/25, cxr, and ecg done    AORTOGRAPHY WITH SERIALOGRAPHY Right 3/30/2021    Procedure: AORTOGRAM, WITH RIGHT LOWER EXTREMITY ANGIOGRAM, POSSIBLE INTERVENTION;  Surgeon: Mitch Chan MD;  Location: [facility] OR;  Service: Vascular;  Laterality: Right;  RN PREOP 3/26/2021  COVID NEGATIVE    BAERVELDT GLAUCOMA IMPLANT Left 2012    WITH CATARACT EXTRACTION//DONE AT [facility]    BELOW KNEE AMPUTATION OF LOWER EXTREMITY Right 5/17/2021    Procedure: AMPUTATION, BELOW KNEE;  Surgeon: Christiana Pritchett MD;  Location: [facility] OR;  Service: Orthopedics;  Laterality: Right;  WOUND VAC -PREVENA  SUPINE---HAS--ICD- Iast interrogation -3/21  AMPUTATION TRAY  1ST CASE OKWHITNEY WHITLOCK  RN PREOP 5/13/2021     COVID --NEGATIVE ON 5/14---INCOMPLETE H/P    BIOPSY Right 12/18/2020    Procedure: Biopsy- trocar biopsy;  Surgeon: Brianna Champion DPM;  Location: [facility] OR;  Service: Podiatry;  Laterality: Right;    CARDIAC CATHETERIZATION Left 05/2016    CARDIAC DEFIBRILLATOR PLACEMENT Left 11/02/2018    CATARACT EXTRACTION W/  INTRAOCULAR LENS IMPLANT Left 2012    WITH BAERVEDT (DONE AT [facility])    CATARACT EXTRACTION W/  INTRAOCULAR LENS IMPLANT Right 09/26/2018    COMPLEX ()    CB DESTRUCTION WITH CYCLO G6 Left 02/15/2017        CYST REMOVAL      DEBRIDEMENT OF FOOT  12/28/2018    Procedure: DEBRIDEMENT, FOOT;  Surgeon: Mitch Chan MD;  Location: [facility] OR;  Service: Vascular;;    DEBRIDEMENT OF FOOT  6/5/2019    Procedure: DEBRIDEMENT, FOOT;  Surgeon: Mitch Chan MD;  Location: [facility] OR;  Service:  Vascular;;    DEBRIDEMENT OF FOOT Right 3/5/2021    Procedure: DEBRIDEMENT, FOOT with Misonic device;  Surgeon: Mitch Wall MD;  Location: [facility] OR;  Service: Vascular;  Laterality: Right;    EXAMINATION UNDER ANESTHESIA Left 12/5/2018    Procedure: Exam under anesthesia, left foot debridement, washout and all other indicated procedures;  Surgeon: Mitch Wall MD;  Location: [facility] OR;  Service: Vascular;  Laterality: Left;  1030AM START  RN PREOP 12/3/2018-----AICD  SEEN BY DR JAMES 12/4    EXAMINATION UNDER ANESTHESIA Left 2/13/2019    Procedure: LEFT FOOT WOUND DEBRIDEMENT, WASHOUT AND ALL OTHER INDICATED PROCEDURES;  Surgeon: Mitch Wall MD;  Location: [facility] OR;  Service: Vascular;  Laterality: Left;  requesting 1st case start  THIS CASE 1ST PER DR WALL ON 2/1/19 @ 844AM -[place]  RN PREOP 2/6/2019  HAS CARDIAC CLEARANCE    EXAMINATION UNDER ANESTHESIA Left 12/28/2018    Procedure: Exam under anesthesia, left foot debridement, left foot washout, possible left second through fifth metatarsal resection;  Surgeon: Mitch Wall MD;  Location: [facility] OR;  Service: Vascular;  Laterality: Left;  1:00pm start per julissa @ 8:58am  RN  PHONE PRE OP 12-27-18--=Pt coming [facility]  NEED CONSENT    EXAMINATION UNDER ANESTHESIA Left 6/5/2019    Procedure: LEFT FOOT DEBRIDEMENT, WASHOUT AND ALL OTHER INDICATED PROCEDURES;  Surgeon: Mitch Wall MD;  Location: [facility] OR;  Service: Vascular;  Laterality: Left;  RN PRE OP 5-31-19------ICD------NEED CONSENT    EXAMINATION UNDER ANESTHESIA Right 11/9/2020    Procedure: RIGHT FOOT DEBRIDEMENT, WASHOUT AND ALL OTHER INDICATED PROCEDURES;  Surgeon: Mitch Wall MD;  Location: [facility] OR;  Service: Vascular;  Laterality: Right;  RN PREOP 10/27/2020, --COVID NEGATIVE ON 11/6, has cardiac clearance/[place] 10/27/2020, pt has ICD  NEEDS ORDERS    EXAMINATION UNDER ANESTHESIA Right 2/4/2021    Procedure: RIGHT FOOT DEBRIDEMENT,  POSSIBLE CALCANECTOMY, POSSIBLE FIFTH TOE AMPUTATION, WASHOUT AND ALL OTHER INDICATED PROCEDURES;  Surgeon: Mitch Chan MD;  Location: [facility] OR;  Service: Vascular;  Laterality: Right;  RN PREOP 2/2/2021--COVID NEGATIVE ON 2/2  ICD    EYE SURGERY      CAT EXT OU    FOOT AMPUTATION THROUGH METATARSAL Right 3/5/2021    Procedure: Right foot wound debridement, possible transmetatarsal amputation, possible fifth toe amputation, washout;  Surgeon: Mitch Chan MD;  Location: [facility] OR;  Service: Vascular;  Laterality: Right;  MISONIX SONIC ONE JAYDEN LOUISE [phone] SPOKE TO HIM ON MY OFFICE @ 7:31AM ON 2-  RN PRE Op, Covid NEGATIVE ON  3-2-21.  C A  8:30AM START----NEED H/P    INCISION AND DRAINAGE Left 11/14/2018    Procedure: Incision and Drainage, left lower extremity debridement, washout;  Surgeon: Mitch Chan MD;  Location: [facility] OR;  Service: Vascular;  Laterality: Left;    INCISION AND DRAINAGE Left 11/16/2018    Procedure: INCISION AND DRAINAGE;  Surgeon: Mitch Chan MD;  Location: [facility] OR;  Service: Vascular;  Laterality: Left;    INCISION AND DRAINAGE Right 11/18/2020    Procedure: Debridement and washout right lower extremity wounds;  Surgeon: Mitch Chan MD;  Location: [facility] OR 2ND FLR;  Service: Vascular;  Laterality: Right;    INCISION AND DRAINAGE Right 1/27/2021    Procedure: RIGHT FOOT DEBRIDEMENT, WASHOUT AND ALL OTHER INDICATED PROCEDURES;  Surgeon: Mitch Chan MD;  Location: [facility] OR 2ND FLR;  Service: Vascular;  Laterality: Right;    INTRAOCULAR PROSTHESES INSERTION Right 9/26/2018    Procedure: INSERTION, IOL PROSTHESIS;  Surgeon: Perla Cortés MD;  Location: [facility] OR 1ST FLR;  Service: Ophthalmology;  Laterality: Right;    PERITONEOCENTESIS N/A 3/1/2019    Procedure: PARACENTESIS, ABDOMINAL, INITIAL;  Surgeon: [facility] Diagnostic Provider;  Location: [facility] OR;  Service: Radiology;  Laterality: N/A;    PERITONEOCENTESIS N/A  3/25/2019    Procedure: PARACENTESIS, ABDOMINAL, INITIAL;  Surgeon: Dosc Diagnostic Provider;  [facility] OR;  Service: Radiology;  Laterality: N/A;    PERITONEOCENTESIS N/A 7/22/2019    Procedure: PARACENTESIS, ABDOMINAL, INITIAL;  Surgeon: Dosc Diagnostic Provider;  Location: [facility] OR;  Service: Radiology;  Laterality: N/A;    PERITONEOCENTESIS N/A 8/16/2019    Procedure: PARACENTESIS, ABDOMINAL, INITIAL;  Surgeon: Dosc Diagnostic Provider;  [facility];  Service: Radiology;  Laterality: N/A;    PERITONEOCENTESIS N/A 9/26/2019    Procedure: PARACENTESIS, ABDOMINAL;  Surgeon: Dosc Diagnostic Provider;  [facility];  Service: Radiology;  Laterality: N/A;    PERITONEOCENTESIS N/A 10/11/2019    Procedure: PARACENTESIS, ABDOMINAL;  Surgeon: Dosc Diagnostic Provider;  [facility];  Service: Radiology;  Laterality: N/A;    PERITONEOCENTESIS N/A 10/31/2019    Procedure: PARACENTESIS, ABDOMINAL;  Surgeon: Dosc Diagnostic Provider;  [facility];  Service: Radiology;  Laterality: N/A;    PERITONEOCENTESIS N/A 11/18/2019    Procedure: PARACENTESIS, ABDOMINAL;  Surgeon: Dosc Diagnostic Provider;  [facility];  Service: Radiology;  Laterality: N/A;    PERITONEOCENTESIS N/A 12/16/2019    Procedure: PARACENTESIS, ABDOMINAL;  Surgeon: Dosc Diagnostic Provider;  [facility];  Service: Radiology;  Laterality: N/A;  2PM START    PERITONEOCENTESIS N/A 2/7/2020    Procedure: PARACENTESIS, ABDOMINAL;  Surgeon: Dosc Diagnostic Provider;  [facility];  Service: Radiology;  Laterality: N/A;  REQUESTED 10:30A START    PERITONEOCENTESIS N/A 2/19/2020    Procedure: PARACENTESIS, ABDOMINAL;  Surgeon: Dosc Diagnostic Provider;  [facility];  Service: Radiology;  Laterality: N/A;    PERITONEOCENTESIS N/A 2/28/2020    Procedure: PARACENTESIS, ABDOMINAL;  Surgeon: Dosc Diagnostic Provider;  Location: [facility] OR;  Service: Radiology;  Laterality: N/A;  REQUESTED 10AM START     PHACOEMULSIFICATION OF CATARACT Right 9/26/2018    Procedure: PHACOEMULSIFICATION, CATARACT;  Surgeon: Perla Cortés MD;  Location: [facility] OR [address] [place] [address];  Service: Ophthalmology;  Laterality: Right;    REPEAT ABDOMINAL PARACENTESIS N/A 2/11/2019    Procedure: PARACENTESIS, ABDOMINAL, REPEAT;  Surgeon: Dosc Diagnostic Provider;  Location: [facility] OR;  Service: Radiology;  Laterality: N/A;  1PM START    REPEAT ABDOMINAL PARACENTESIS N/A 9/12/2019    Procedure: PARACENTESIS, ABDOMINAL, REPEAT;  Surgeon: Dosc Diagnostic Provider;  Location: [facility] OR;  Service: Radiology;  Laterality: N/A;  9AM START    REPEAT ABDOMINAL PARACENTESIS N/A 12/2/2019    Procedure: PARACENTESIS, ABDOMINAL, REPEAT;  Surgeon: Dosc Diagnostic Provider;  Location: [facility] OR;  Service: Radiology;  Laterality: N/A;  3PM START    REPEAT ABDOMINAL PARACENTESIS N/A 1/10/2020    Procedure: PARACENTESIS, ABDOMINAL, REPEAT;  Surgeon: Dosc Diagnostic Provider;  Location: [facility] OR;  Service: Radiology;  Laterality: N/A;  1130AM START    REPEAT ABDOMINAL PARACENTESIS N/A 1/20/2020    Procedure: PARACENTESIS, ABDOMINAL, REPEAT;  Surgeon: Dosc Diagnostic Provider;  Location: [facility] OR;  Service: Radiology;  Laterality: N/A;  1PM START    REPEAT ABDOMINAL PARACENTESIS N/A 1/31/2020    Procedure: PARACENTESIS, ABDOMINAL, REPEAT;  Surgeon: Dosc Diagnostic Provider;  Location: [facility] OR;  Service: Radiology;  Laterality: N/A;  10AM START    REPEAT ABDOMINAL PARACENTESIS N/A 3/16/2020    Procedure: PARACENTESIS, ABDOMINAL, REPEAT;  Surgeon: Dosc Diagnostic Provider;  Location: [facility] OR;  Service: Radiology;  Laterality: N/A;  10AM START    REPEAT ABDOMINAL PARACENTESIS N/A 3/30/2020    Procedure: PARACENTESIS, ABDOMINAL, REPEAT;  Surgeon: Dosc Diagnostic Provider;  Location: [facility] OR;  Service: Radiology;  Laterality: N/A;    REPEAT ABDOMINAL PARACENTESIS N/A 4/9/2020    Procedure: PARACENTESIS, ABDOMINAL, REPEAT;  Surgeon: Dosc Diagnostic Provider;  Location: [facility]  OR;  Service: Radiology;  Laterality: N/A;  1130AM START    REPEAT ABDOMINAL PARACENTESIS N/A 5/8/2020    Procedure: PARACENTESIS, ABDOMINAL, REPEAT;  Surgeon: Dosc Diagnostic Provider;  Location: [facility] OR;  Service: Radiology;  Laterality: N/A;  9AM START    REPEAT ABDOMINAL PARACENTESIS N/A 5/21/2020    Procedure: PARACENTESIS, ABDOMINAL, REPEAT;  Surgeon: Dosc Diagnostic Provider;  Location: [facility] OR;  Service: Radiology;  Laterality: N/A;  10AM START    REPEAT ABDOMINAL PARACENTESIS N/A 6/5/2020    Procedure: PARACENTESIS, ABDOMINAL, REPEAT;  Surgeon: Dosc Diagnostic Provider;  Location: [facility] OR;  Service: Radiology;  Laterality: N/A;  NOON START    REPEAT ABDOMINAL PARACENTESIS N/A 7/10/2020    Procedure: PARACENTESIS, ABDOMINAL, REPEAT;  Surgeon: Dosc Diagnostic Provider;  Location: [facility] OR;  Service: Radiology;  Laterality: N/A;  1PM START    REPEAT ABDOMINAL PARACENTESIS N/A 8/20/2020    Procedure: PARACENTESIS, ABDOMINAL, REPEAT;  Surgeon: Dosc Diagnostic Provider;  Location: [facility] OR;  Service: Radiology;  Laterality: N/A;  1PM START    REPEAT ABDOMINAL PARACENTESIS N/A 9/4/2020    Procedure: PARACENTESIS, ABDOMINAL, REPEAT;  Surgeon: Dosc Diagnostic Provider;  Location: [facility] OR;  Service: Radiology;  Laterality: N/A;  11 AM START    REPEAT ABDOMINAL PARACENTESIS N/A 9/16/2020    Procedure: PARACENTESIS, ABDOMINAL, REPEAT;  Surgeon: Dosc Diagnostic Provider;  Location: [facility] OR;  Service: Radiology;  Laterality: N/A;  START 2:00 P.M.    REPEAT ABDOMINAL PARACENTESIS N/A 9/28/2020    Procedure: PARACENTESIS, ABDOMINAL, REPEAT;  Surgeon: Dosc Diagnostic Provider;  Location: [facility] OR;  Service: Radiology;  Laterality: N/A;  1 P.M. START    REPEAT ABDOMINAL PARACENTESIS N/A 11/3/2020    Procedure: PARACENTESIS, ABDOMINAL, REPEAT;  Surgeon: Dosc Diagnostic Provider;  Location: [facility] OR;  Service: Radiology;  Laterality: N/A;  11AM START    REPEAT ABDOMINAL PARACENTESIS N/A 11/13/2020    Procedure:  PARACENTESIS, ABDOMINAL, REPEAT;  Surgeon: Dosc Diagnostic Provider;  Location: [facility] OR;  Service: Radiology;  Laterality: N/A;  12PM START    REPEAT ABDOMINAL PARACENTESIS N/A 11/23/2020    Procedure: PARACENTESIS, ABDOMINAL, REPEAT;  Surgeon: Dosc Diagnostic Provider;  [facility];  Service: Radiology;  Laterality: N/A;    REPEAT ABDOMINAL PARACENTESIS N/A 12/1/2020    Procedure: PARACENTESIS, ABDOMINAL, REPEAT;  Surgeon: Dosc Diagnostic Provider;  Location: [facility] OR;  Service: Radiology;  Laterality: N/A;  11AM START    REPEAT ABDOMINAL PARACENTESIS N/A 12/8/2020    Procedure: PARACENTESIS, ABDOMINAL, REPEAT;  Surgeon: Dosc Diagnostic Provider;  Location: [facility] OR;  Service: Radiology;  Laterality: N/A;  93AM START    REPEAT ABDOMINAL PARACENTESIS N/A 12/17/2020    Procedure: PARACENTESIS, ABDOMINAL, REPEAT;  Surgeon: Dosc Diagnostic Provider;  Location: [facility] OR;  Service: Radiology;  Laterality: N/A;  10AM START    REPEAT ABDOMINAL PARACENTESIS N/A 12/30/2020    Procedure: PARACENTESIS, ABDOMINAL, REPEAT;  Surgeon: Dosc Diagnostic Provider;  Location: [facility] OR;  Service: Radiology;  Laterality: N/A;  9 A.M. START    REPEAT ABDOMINAL PARACENTESIS N/A 1/12/2021    Procedure: PARACENTESIS, ABDOMINAL, REPEAT;  Surgeon: Dos Diagnostic Provider;  Location: [facility] OR;  Service: Radiology;  Laterality: N/A;  10 AM START    REPEAT ABDOMINAL PARACENTESIS N/A 2/10/2021    Procedure: PARACENTESIS, ABDOMINAL, REPEAT;  Surgeon: Dosc Diagnostic Provider;  Location: [facility] OR;  Service: Radiology;  Laterality: N/A;  2 P.M. START    REPEAT ABDOMINAL PARACENTESIS N/A 2/18/2021    Procedure: PARACENTESIS, ABDOMINAL, REPEAT;  Surgeon: Dosc Diagnostic Provider;  Location: [facility] OR;  Service: Radiology;  Laterality: N/A;  230PM START    REPEAT ABDOMINAL PARACENTESIS N/A 2/26/2021    Procedure: PARACENTESIS, ABDOMINAL, REPEAT;  Surgeon: Dosc Diagnostic Provider;  [facility];  Service: Radiology;  Laterality: N/A;     REPEAT ABDOMINAL PARACENTESIS N/A 3/4/2021    Procedure: PARACENTESIS, ABDOMINAL, REPEAT;  Surgeon: Dosc Diagnostic Provider;  Location: [facility] OR;  Service: Radiology;  Laterality: N/A;  9AM START    REPEAT ABDOMINAL PARACENTESIS N/A 3/12/2021    Procedure: PARACENTESIS, ABDOMINAL, REPEAT;  Surgeon: Dosc Diagnostic Provider;  Location: [facility] OR;  Service: Radiology;  Laterality: N/A;  10:00 START TIME  NO PRE-OP REQUIRED    REPEAT ABDOMINAL PARACENTESIS N/A 4/7/2021    Procedure: PARACENTESIS, ABDOMINAL, REPEAT;  Surgeon: Dosc Diagnostic Provider;  Location: [facility] OR;  Service: Radiology;  Laterality: N/A;  1 P.M. START    REPEAT ABDOMINAL PARACENTESIS N/A 4/16/2021    Procedure: PARACENTESIS, ABDOMINAL, REPEAT;  Surgeon: Dos Diagnostic Provider;  Location: [facility] OR;  Service: Radiology;  Laterality: N/A;  9:OO START  NO PRE-OP REQ'D    REPEAT ABDOMINAL PARACENTESIS N/A 4/26/2021    Procedure: PARACENTESIS, ABDOMINAL, REPEAT;  Surgeon: Dos Diagnostic Provider;  Location: [facility] OR;  Service: Radiology;  Laterality: N/A;  9AM START    REPEAT ABDOMINAL PARACENTESIS N/A 5/6/2021    Procedure: PARACENTESIS, ABDOMINAL, REPEAT;  Surgeon: Dos Diagnostic Provider;  Location: [facility] OR;  Service: Radiology;  Laterality: N/A;  3PM START    REPEAT ABDOMINAL PARACENTESIS N/A 5/14/2021    Procedure: PARACENTESIS, ABDOMINAL, REPEAT;  Surgeon: Dosc Diagnostic Provider;  Location: [facility] OR;  Service: Radiology;  Laterality: N/A;  1:00PM START  NO PRE-OP REQ'D    REPEAT ABDOMINAL PARACENTESIS N/A 5/24/2021    Procedure: PARACENTESIS, ABDOMINAL, REPEAT;  Surgeon: Dosc Diagnostic Provider;  Location: [facility] OR;  Service: Radiology;  Laterality: N/A;  2 P.M START TIME    REPEAT ABDOMINAL PARACENTESIS N/A 6/4/2021    Procedure: PARACENTESIS, ABDOMINAL, REPEAT;  Surgeon: Dosc Diagnostic Provider;  Location: [facility] OR;  Service: Radiology;  Laterality: N/A;  11AM START    REPEAT ABDOMINAL PARACENTESIS N/A 6/14/2021    Procedure:  PARACENTESIS, ABDOMINAL, REPEAT;  Surgeon: [facility] Diagnostic Provider;  Location: [facility] OR;  Service: Radiology;  Laterality: N/A;  8AM START    REPEAT ABDOMINAL PARACENTESIS N/A 6/23/2021    Procedure: PARACENTESIS, ABDOMINAL, REPEAT;  Surgeon: [facility] Diagnostic Provider;  Location: [facility] OR;  Service: Radiology;  Laterality: N/A;    REVISION OF PROCEDURE INVOLVING GLAUCOMA DRAINAGE DEVICE Left 10/2/2019    EXTRUDING BAERVELDT /WITH PERICARDIAL PATCH GRAFT ()    Right foot surgery  10/2014    TOE AMPUTATION Right     first and second    TOE AMPUTATION Left 11/16/2018    Procedure: AMPUTATION, TOES  2-5;  Surgeon: Mitch Chan MD;  Location: [facility] OR;  Service: Vascular;  Laterality: Left;    TOE AMPUTATION Left 12/5/2018    Procedure: AMPUTATION, TOE;  Surgeon: Mitch Chan MD;  Location: [facility] OR;  Service: Vascular;  Laterality: Left;  left great toe    TONSILLECTOMY       Home Meds:   Prior to Admission medications    Medication Sig Start Date End Date Taking? Authorizing Provider   acetaminophen (TYLENOL) 325 MG tablet Take 650 mg by mouth every 6 (six) hours as needed for Pain.    Historical Provider   allopurinoL (ZYLOPRIM) 300 MG tablet Take 1 tablet (300 mg total) by mouth once daily. 9/28/21   Rubén Davis MD   aspirin (ECOTRIN) 81 MG EC tablet Take 81 mg by mouth once daily.    Historical Provider   blood sugar diagnostic Strp To check BG 2 times daily, to use with insurance preferred meter. e11.65 1/18/22   Sheryl Madison NP   blood-glucose meter kit To check BG 2 times daily, to use with insurance preferred meter 1/18/22 1/18/23  Sheryl Madison NP   brimonidine 0.2% (ALPHAGAN) 0.2 % Drop INSTILL 1 DROP IN BOTH EYES THREE TIMES DAILY 1/9/22   Perla Cortés MD   carvediloL (COREG) 3.125 MG tablet TAKE 1 TABLET(3.125 MG) BY MOUTH TWICE DAILY 1/4/22   Rubén Davis MD   clopidogreL (PLAVIX) 75 mg tablet Take 1 tablet (75 mg total) by mouth once daily. 1/14/21 1/14/22   "Mitch Chan MD   coenzyme Q10 100 mg capsule Take 100 mg by mouth every morning.    Historical Provider   collagenase (SANTYL) ointment Apply topically to diabetic ulcer on lower right leg once daily. 4/13/21   Rubén Davis MD   dorzolamide-timolol 2-0.5% (COSOPT) 22.3-6.8 mg/mL ophthalmic solution Place 1 drop into both eyes 3 (three) times daily. 12/16/21   Perla Cortés MD   ezetimibe (ZETIA) 10 mg tablet TAKE 1 TABLET(10 MG) BY MOUTH EVERY DAY 12/30/21   Rubén Davis MD   fish oil-omega-3 fatty acids 300-1,000 mg capsule Take 1 capsule by mouth 2 (two) times daily. 1/23/19   Sara Ching MD   gabapentin (NEURONTIN) 100 MG capsule TAKE 2 CAPSULES(200 MG) BY MOUTH EVERY EVENING 10/8/21   Rubén Davis MD   HYDROcodone-acetaminophen (NORCO) 5-325 mg per tablet Take 1 tablet by mouth every 12 (twelve) hours as needed for Pain. 9/21/21   Rubén Davis MD   insulin degludec-liraglutide (XULTOPHY 100/3.6) 100 unit-3.6 mg /mL (3 mL) Pen Inject 35 Units into the skin once daily. 1/18/22   Sheryl Madison NP   lancets Misc To check BG 2 times daily, to use with insurance preferred meter. Dx code e11.65 1/18/22   Sheryl Madison NP   loratadine (CLARITIN) 10 mg tablet Take 10 mg by mouth daily as needed for Allergies.    Historical Provider   metOLazone (ZAROXOLYN) 2.5 MG tablet TAKE 1 TABLET(2.5 MG) BY MOUTH EVERY DAY 11/30/21   Rubén Dvais MD   MULTIVIT,THER IRON,CA,FA & MIN (MULTIVITAMIN) Tab Take 1 tablet by mouth every morning.     Historical Provider   pen needle, diabetic (BD ULTRA-FINE AMADOR PEN NEEDLE) 32 gauge x 5/32" Ndle 1 Device by Misc.(Non-Drug; Combo Route) route 2 (two) times a day. 9/11/20   Sheryl Madison NP   torsemide (DEMADEX) 20 MG Tab TAKE 4 TABLETS(80 MG) BY MOUTH TWICE DAILY 1/2/22   Rubén Davis MD     Anticoagulants/Antiplatelets: aspirin and Plavix    Allergies:   Review of patient's allergies indicates:   Allergen Reactions    Statins-hmg-coa reductase " inhibitors      Generalized Pain    Onglyza [saxagliptin]     Penicillins Rash     Sedation History:  no adverse reactions     Review of Systems:   Hematological: no known coagulopathies  Respiratory: positive for - orthopnea and shortness of breath  Cardiovascular: positive for - dyspnea on exertion, orthopnea and shortness of breath  Gastrointestinal: positive for - abdominal pain and distension  Genito-Urinary: no dysuria, trouble voiding, or hematuria  Musculoskeletal: negative  Neurological: no TIA or stroke symptoms      OBJECTIVE:     Vital Signs (Most Recent)     Physical Exam:  General: no acute distress  Mental Status: alert and oriented to person, place and time  HEENT: normocephalic, atraumatic  Chest: mild labored breathing  Heart: regular heart rate  Abdomen: +distended with +fluid-wave. No TTP/r/g.  Extremity: moves all extremities    Laboratory  Lab Results   Component Value Date    INR 1.0 08/05/2021       Lab Results   Component Value Date    WBC 4.58 12/14/2021    HGB 14.1 12/14/2021    HCT 44.3 12/14/2021    MCV 96 12/14/2021     12/14/2021      Lab Results   Component Value Date     (H) 12/14/2021     12/14/2021    K 3.5 12/14/2021    CL 98 12/14/2021    CO2 32 (H) 12/14/2021    BUN 59 (H) 12/14/2021    CREATININE 1.3 12/14/2021    CALCIUM 8.6 (L) 12/14/2021    MG 2.1 05/31/2019    ALT 18 12/14/2021    AST 20 12/14/2021    ALBUMIN 2.7 (L) 12/14/2021    BILITOT 0.4 12/14/2021     ASSESSMENT/PLAN:     60 y/o M with CKD III and combined systolic/diastolic CHF complicated by recurrent abdominal distension and pain 2/2 recurrent painful, tense ascites requiring frequent therapeutic large-volume paracentesis.        1. Recurrent painful, tense ascites - Will attempt US-guided therapeutic percutaneous RLQ-approacch paracentesis with local anesthetic only and albumin infusion post PRN as indicated per institutional protocol.      Risks (including, but not limited to, pain,  bleeding, infection, damage to nearby structures, failure to obtain sufficient material for a diagnosis, the need for additional procedures, and death), benefits, and alternatives were discussed with the patient. All questions were answered to the best of my abilities. The patient wishes to proceed with the procedure. Written informed consent was obtained.     Thank you for considering IR for the care of your patient.      Zach Cha MD  Interventional Radiology

## 2022-01-27 NOTE — BRIEF OP NOTE
Radiology Post-Procedure Note     Pre Op Diagnosis: Recurrent painful, tense ascites  Post Op Diagnosis: Same     Procedure: 1. US-guided percutaneous RLQ-approach therapeutic LVP     Procedure performed by: Zach Cha MD     Written Informed Consent Obtained: Yes  Specimen Removed: YES, 5,000-L of slightly cloudy, serous fluid  Estimated Blood Loss: none     Findings:   Successful US-guided percutaneous RLQ-approach therapeutic LVP with local anesthetic only and albumin infusion post PRN as indicated per institutional protocol. Patient tolerated the procedure well. No immediate post-procedural complications noted.      Thank you for considering IR for the care of your patient.      Zach Cha MD  Interventional Radiology

## 2022-01-27 NOTE — DISCHARGE SUMMARY
Radiology Discharge Summary      Hospital Course: No complications    Admit Date: 1/27/2022  Discharge Date: 01/27/2022     Instructions Given to Patient: Yes  Diet: Resume prior diet  Activity: activity as tolerated    Description of Condition on Discharge: Stable  Vital Signs (Most Recent): BP: (!) 147/78 (01/27/22 1032)    Discharge Disposition: Home    Discharge Diagnosis:   62 y/o M with h/o recurrent painful, tense ascites s/p successful US-guided percutaneous RLQ-approach therapeutic LVP with local anesthetic only and albumin infusion post as indicated per institutional protocol. Patient tolerated the procedures well. No immediate post-procedural complications noted.      Thank you for considering IR for the care of your patient.      Zach Cha MD  Interventional Radiology

## 2022-02-03 NOTE — DISCHARGE SUMMARY
Radiology Discharge Summary      Hospital Course: No complications    Admit Date: 2/3/2022  Discharge Date: 02/03/2022     Instructions Given to Patient: Yes  Diet: Resume prior diet  Activity: activity as tolerated    Description of Condition on Discharge: Stable  Vital Signs (Most Recent): BP: 122/64 (02/03/22 0901)    Discharge Disposition: Home    Discharge Diagnosis:   60 y/o M with h/o recurrent painful, tense ascites s/p successful US-guided percutaneous RLQ-approach therapeutic LVP with local anesthetic only and albumin infusion post as indicated per institutional protocol. Patient tolerated the procedures well. No immediate post-procedural complications noted.      Thank you for considering IR for the care of your patient.      Zach Cha MD  Interventional Radiology

## 2022-02-03 NOTE — H&P
Radiology History & Physical      SUBJECTIVE:     Chief Complaint: Recurrent painful, tense ascites      History of Present Illness:  Marvin Ray is a 61 y.o. male with PMHx of CKD III and combined systolic/diastolic CHF complicated by recurrent abdominal distension and pain 2/2 recurrent painful, tense ascites requiring frequent  therapeutic large-volume paracentesis.      A new outpatient IR consult placed for US-guided therapeutic percutaneous RLQ-approach LVP.    Past Medical History:   Diagnosis Date    Arthritis     Cellulitis     CKD (chronic kidney disease), stage III     Coronary artery disease     Diabetes mellitus     Diabetic retinopathy     Diabetic ulcer of left foot     Glaucoma     Gout     Hyperlipemia     Hypertension     ICD (implantable cardioverter-defibrillator) in place 11/02/2018    Left chest    Non-pressure chronic ulcer of other part of left foot with fat layer exposed 10/23/2018    PVD (peripheral vascular disease)     Type 2 diabetes mellitus with left diabetic foot ulcer 10/29/2018    Unsteady gait     uses a wheelchair     Past Surgical History:   Procedure Laterality Date    AHMED GLAUCOMA IMPLANT Left 2011    DONE AT [facility]    AORTOGRAPHY WITH SERIALOGRAPHY Left 4/30/2019    Procedure: AORTOGRAM, WITH SERIALOGRAPHY, LEFT LOWER EXTREMITY, POSSIBLE INTERVENTION;  Surgeon: Mitch Chan MD;  Location: [facility] OR;  Service: Vascular;  Laterality: Left;  RN PRE OP 4-23-19.  NO H & P, No Cosent  [place]    AORTOGRAPHY WITH SERIALOGRAPHY Right 10/6/2020    Procedure: AORTOGRAM, WITH SERIALOGRAPHY, RIGHT LOWER EXTREMITY ANGIOGRAM, POSSIBLE INTERVENTION;  Surgeon: Mitch Chan MD;  Location: [facility] OR;  Service: Vascular;  Laterality: Right;  RN PREOP 10/1/2020--COVID NEGATIVE ON 10/5    AORTOGRAPHY WITH SERIALOGRAPHY Right 1/13/2021    Procedure: AORTOGRAM, WITH RIGHT LOWER EXTREMITY ANGIOGRAM, POSSIBLE INTERVENTION;  Surgeon: Mitch Cahn MD;   Location: [facility] OR;  Service: Vascular;  Laterality: Right;  1PM START----NEED CONSENT  RN PREOP 1/8/2021--COVID NEGATIVE ON 1/12    AORTOGRAPHY WITH SERIALOGRAPHY Right 1/26/2021    Procedure: AORTOGRAM, RIGHT LOWER EXTREMITY ANGIOGRAM, POSSIBLE INTERVENTION;  Surgeon: Mitch Chan MD;  Location: [facility] OR;  Service: Vascular;  Laterality: Right;  RN PREOP 1/25/2021, COVID NEGATIVE ON 1/25, cxr, and ecg done    AORTOGRAPHY WITH SERIALOGRAPHY Right 3/30/2021    Procedure: AORTOGRAM, WITH RIGHT LOWER EXTREMITY ANGIOGRAM, POSSIBLE INTERVENTION;  Surgeon: Mitch Chan MD;  Location: [facility] OR;  Service: Vascular;  Laterality: Right;  RN PREOP 3/26/2021  COVID NEGATIVE    BAERVELDT GLAUCOMA IMPLANT Left 2012    WITH CATARACT EXTRACTION//DONE AT [facility]    BELOW KNEE AMPUTATION OF LOWER EXTREMITY Right 5/17/2021    Procedure: AMPUTATION, BELOW KNEE;  Surgeon: Christiana Pritchett MD;  Location: [facility] OR;  Service: Orthopedics;  Laterality: Right;  WOUND VAC -PREVENA  SUPINE---HAS--ICD- Iast interrogation -3/21  AMPUTATION TRAY  1ST CASE OKWHITNEY WHITLOCK  RN PREOP 5/13/2021     COVID --NEGATIVE ON 5/14---INCOMPLETE H/P    BIOPSY Right 12/18/2020    Procedure: Biopsy- trocar biopsy;  Surgeon: Brianna Champion DPM;  Location: [facility] OR;  Service: Podiatry;  Laterality: Right;    CARDIAC CATHETERIZATION Left 05/2016    CARDIAC DEFIBRILLATOR PLACEMENT Left 11/02/2018    CATARACT EXTRACTION W/  INTRAOCULAR LENS IMPLANT Left 2012    WITH BAERVEDT (DONE AT [facility])    CATARACT EXTRACTION W/  INTRAOCULAR LENS IMPLANT Right 09/26/2018    COMPLEX ()    CB DESTRUCTION WITH CYCLO G6 Left 02/15/2017        CYST REMOVAL      DEBRIDEMENT OF FOOT  12/28/2018    Procedure: DEBRIDEMENT, FOOT;  Surgeon: Mitch Chan MD;  Location: [facility] OR;  Service: Vascular;;    DEBRIDEMENT OF FOOT  6/5/2019    Procedure: DEBRIDEMENT, FOOT;  Surgeon: Mitch Chan MD;  Location: [facility] OR;  Service:  Vascular;;    DEBRIDEMENT OF FOOT Right 3/5/2021    Procedure: DEBRIDEMENT, FOOT with Misonic device;  Surgeon: Mitch Wall MD;  Location: [facility] OR;  Service: Vascular;  Laterality: Right;    EXAMINATION UNDER ANESTHESIA Left 12/5/2018    Procedure: Exam under anesthesia, left foot debridement, washout and all other indicated procedures;  Surgeon: Mitch Wall MD;  Location: [facility] OR;  Service: Vascular;  Laterality: Left;  1030AM START  RN PREOP 12/3/2018-----AICD  SEEN BY DR JAMES 12/4    EXAMINATION UNDER ANESTHESIA Left 2/13/2019    Procedure: LEFT FOOT WOUND DEBRIDEMENT, WASHOUT AND ALL OTHER INDICATED PROCEDURES;  Surgeon: Mitch Wall MD;  Location: [facility] OR;  Service: Vascular;  Laterality: Left;  requesting 1st case start  THIS CASE 1ST PER DR WALL ON 2/1/19 @ 844AM -LO  RN PREOP 2/6/2019  HAS CARDIAC CLEARANCE    EXAMINATION UNDER ANESTHESIA Left 12/28/2018    Procedure: Exam under anesthesia, left foot debridement, left foot washout, possible left second through fifth metatarsal resection;  Surgeon: Mitch Wall MD;  Location: [facility] OR;  Service: Vascular;  Laterality: Left;  1:00pm start per julissa @ 8:58am  RN  PHONE PRE OP 12-27-18--=Pt coming from [facility] on [facility]  NEED CONSENT    EXAMINATION UNDER ANESTHESIA Left 6/5/2019    Procedure: LEFT FOOT DEBRIDEMENT, WASHOUT AND ALL OTHER INDICATED PROCEDURES;  Surgeon: Mitch Wall MD;  Location: [facility] OR;  Service: Vascular;  Laterality: Left;  RN PRE OP 5-31-19------ICD------NEED CONSENT    EXAMINATION UNDER ANESTHESIA Right 11/9/2020    Procedure: RIGHT FOOT DEBRIDEMENT, WASHOUT AND ALL OTHER INDICATED PROCEDURES;  Surgeon: Mitch Wall MD;  Location: [facility] OR;  Service: Vascular;  Laterality: Right;  RN PREOP 10/27/2020, --COVID NEGATIVE ON 11/6, has cardiac clearance/[place] 10/27/2020, pt has ICD  NEEDS ORDERS    EXAMINATION UNDER ANESTHESIA Right 2/4/2021    Procedure: RIGHT FOOT DEBRIDEMENT,  POSSIBLE CALCANECTOMY, POSSIBLE FIFTH TOE AMPUTATION, WASHOUT AND ALL OTHER INDICATED PROCEDURES;  Surgeon: Mitch Chan MD;  Location: [facility] OR;  Service: Vascular;  Laterality: Right;  RN PREOP 2/2/2021--COVID NEGATIVE ON 2/2  ICD    EYE SURGERY      CAT EXT OU    FOOT AMPUTATION THROUGH METATARSAL Right 3/5/2021    Procedure: Right foot wound debridement, possible transmetatarsal amputation, possible fifth toe amputation, washout;  Surgeon: Mitch Chan MD;  Location: [facility] OR;  Service: Vascular;  Laterality: Right;  MISONIX SONIC ONE JAYDEN LOUISE [phone] SPOKE TO HIM ON MY OFFICE @ 7:31AM ON 2-  RN PRE Op, Covid NEGATIVE ON  3-2-21.  C A  8:30AM START----NEED H/P    INCISION AND DRAINAGE Left 11/14/2018    Procedure: Incision and Drainage, left lower extremity debridement, washout;  Surgeon: Mitch Chan MD;  Location: [facility] OR;  Service: Vascular;  Laterality: Left;    INCISION AND DRAINAGE Left 11/16/2018    Procedure: INCISION AND DRAINAGE;  Surgeon: Mitch Chan MD;  Location: [facility] OR;  Service: Vascular;  Laterality: Left;    INCISION AND DRAINAGE Right 11/18/2020    Procedure: Debridement and washout right lower extremity wounds;  Surgeon: Mitch Chan MD;  Location: [facility] OR 2ND FLR;  Service: Vascular;  Laterality: Right;    INCISION AND DRAINAGE Right 1/27/2021    Procedure: RIGHT FOOT DEBRIDEMENT, WASHOUT AND ALL OTHER INDICATED PROCEDURES;  Surgeon: Mitch Chan MD;  Location: [facility] OR 2ND FLR;  Service: Vascular;  Laterality: Right;    INTRAOCULAR PROSTHESES INSERTION Right 9/26/2018    Procedure: INSERTION, IOL PROSTHESIS;  Surgeon: Perla Cortés MD;  Location: [facility] OR 1ST FLR;  Service: Ophthalmology;  Laterality: Right;    PERITONEOCENTESIS N/A 3/1/2019    Procedure: PARACENTESIS, ABDOMINAL, INITIAL;  Surgeon: [facility] Diagnostic Provider;  Location: [facility] OR;  Service: Radiology;  Laterality: N/A;    PERITONEOCENTESIS N/A  3/25/2019    Procedure: PARACENTESIS, ABDOMINAL, INITIAL;  Surgeon: Dosc Diagnostic Provider;  [facility]: [facility] OR;  Service: Radiology;  Laterality: N/A;    PERITONEOCENTESIS N/A 7/22/2019    Procedure: PARACENTESIS, ABDOMINAL, INITIAL;  Surgeon: Dosc Diagnostic Provider;  Location: [facility] OR;  Service: Radiology;  Laterality: N/A;    PERITONEOCENTESIS N/A 8/16/2019    Procedure: PARACENTESIS, ABDOMINAL, INITIAL;  Surgeon: Dosc Diagnostic Provider;  [facility]: [facility] OR;  Service: Radiology;  Laterality: N/A;    PERITONEOCENTESIS N/A 9/26/2019    Procedure: PARACENTESIS, ABDOMINAL;  Surgeon: Dosc Diagnostic Provider;  [facility];  Service: Radiology;  Laterality: N/A;    PERITONEOCENTESIS N/A 10/11/2019    Procedure: PARACENTESIS, ABDOMINAL;  Surgeon: Dosc Diagnostic Provider;  [facility];  Service: Radiology;  Laterality: N/A;    PERITONEOCENTESIS N/A 10/31/2019    Procedure: PARACENTESIS, ABDOMINAL;  Surgeon: Dosc Diagnostic Provider;  [facility];  Service: Radiology;  Laterality: N/A;    PERITONEOCENTESIS N/A 11/18/2019    Procedure: PARACENTESIS, ABDOMINAL;  Surgeon: Dosc Diagnostic Provider;  [facility];  Service: Radiology;  Laterality: N/A;    PERITONEOCENTESIS N/A 12/16/2019    Procedure: PARACENTESIS, ABDOMINAL;  Surgeon: Dosc Diagnostic Provider;  [facility];  Service: Radiology;  Laterality: N/A;  2PM START    PERITONEOCENTESIS N/A 2/7/2020    Procedure: PARACENTESIS, ABDOMINAL;  Surgeon: Dosc Diagnostic Provider;  [facility];  Service: Radiology;  Laterality: N/A;  REQUESTED 10:30A START    PERITONEOCENTESIS N/A 2/19/2020    Procedure: PARACENTESIS, ABDOMINAL;  Surgeon: Dosc Diagnostic Provider;  [facility];  Service: Radiology;  Laterality: N/A;    PERITONEOCENTESIS N/A 2/28/2020    Procedure: PARACENTESIS, ABDOMINAL;  Surgeon: Dosc Diagnostic Provider;  Location: [facility] OR;  Service: Radiology;  Laterality: N/A;  REQUESTED 10AM START     PHACOEMULSIFICATION OF CATARACT Right 9/26/2018    Procedure: PHACOEMULSIFICATION, CATARACT;  Surgeon: Perla Cortés MD;  Location: [facility] OR [address] [place] [address];  Service: Ophthalmology;  Laterality: Right;    REPEAT ABDOMINAL PARACENTESIS N/A 2/11/2019    Procedure: PARACENTESIS, ABDOMINAL, REPEAT;  Surgeon: Dosc Diagnostic Provider;  Location: [facility] OR;  Service: Radiology;  Laterality: N/A;  1PM START    REPEAT ABDOMINAL PARACENTESIS N/A 9/12/2019    Procedure: PARACENTESIS, ABDOMINAL, REPEAT;  Surgeon: Dosc Diagnostic Provider;  Location: [facility] OR;  Service: Radiology;  Laterality: N/A;  9AM START    REPEAT ABDOMINAL PARACENTESIS N/A 12/2/2019    Procedure: PARACENTESIS, ABDOMINAL, REPEAT;  Surgeon: Dosc Diagnostic Provider;  Location: [facility] OR;  Service: Radiology;  Laterality: N/A;  3PM START    REPEAT ABDOMINAL PARACENTESIS N/A 1/10/2020    Procedure: PARACENTESIS, ABDOMINAL, REPEAT;  Surgeon: Dosc Diagnostic Provider;  Location: [facility] OR;  Service: Radiology;  Laterality: N/A;  1130AM START    REPEAT ABDOMINAL PARACENTESIS N/A 1/20/2020    Procedure: PARACENTESIS, ABDOMINAL, REPEAT;  Surgeon: Dosc Diagnostic Provider;  Location: [facility] OR;  Service: Radiology;  Laterality: N/A;  1PM START    REPEAT ABDOMINAL PARACENTESIS N/A 1/31/2020    Procedure: PARACENTESIS, ABDOMINAL, REPEAT;  Surgeon: Dosc Diagnostic Provider;  Location: [facility] OR;  Service: Radiology;  Laterality: N/A;  10AM START    REPEAT ABDOMINAL PARACENTESIS N/A 3/16/2020    Procedure: PARACENTESIS, ABDOMINAL, REPEAT;  Surgeon: Dosc Diagnostic Provider;  Location: [facility] OR;  Service: Radiology;  Laterality: N/A;  10AM START    REPEAT ABDOMINAL PARACENTESIS N/A 3/30/2020    Procedure: PARACENTESIS, ABDOMINAL, REPEAT;  Surgeon: Dosc Diagnostic Provider;  Location: [facility] OR;  Service: Radiology;  Laterality: N/A;    REPEAT ABDOMINAL PARACENTESIS N/A 4/9/2020    Procedure: PARACENTESIS, ABDOMINAL, REPEAT;  Surgeon: Dosc Diagnostic Provider;  Location: [facility]  OR;  Service: Radiology;  Laterality: N/A;  1130AM START    REPEAT ABDOMINAL PARACENTESIS N/A 5/8/2020    Procedure: PARACENTESIS, ABDOMINAL, REPEAT;  Surgeon: Dosc Diagnostic Provider;  Location: [facility] OR;  Service: Radiology;  Laterality: N/A;  9AM START    REPEAT ABDOMINAL PARACENTESIS N/A 5/21/2020    Procedure: PARACENTESIS, ABDOMINAL, REPEAT;  Surgeon: Dosc Diagnostic Provider;  Location: [facility] OR;  Service: Radiology;  Laterality: N/A;  10AM START    REPEAT ABDOMINAL PARACENTESIS N/A 6/5/2020    Procedure: PARACENTESIS, ABDOMINAL, REPEAT;  Surgeon: Dosc Diagnostic Provider;  Location: [facility] OR;  Service: Radiology;  Laterality: N/A;  NOON START    REPEAT ABDOMINAL PARACENTESIS N/A 7/10/2020    Procedure: PARACENTESIS, ABDOMINAL, REPEAT;  Surgeon: Dosc Diagnostic Provider;  Location: [facility] OR;  Service: Radiology;  Laterality: N/A;  1PM START    REPEAT ABDOMINAL PARACENTESIS N/A 8/20/2020    Procedure: PARACENTESIS, ABDOMINAL, REPEAT;  Surgeon: Dosc Diagnostic Provider;  Location: [facility] OR;  Service: Radiology;  Laterality: N/A;  1PM START    REPEAT ABDOMINAL PARACENTESIS N/A 9/4/2020    Procedure: PARACENTESIS, ABDOMINAL, REPEAT;  Surgeon: Dosc Diagnostic Provider;  Location: [facility] OR;  Service: Radiology;  Laterality: N/A;  11 AM START    REPEAT ABDOMINAL PARACENTESIS N/A 9/16/2020    Procedure: PARACENTESIS, ABDOMINAL, REPEAT;  Surgeon: Dosc Diagnostic Provider;  Location: [facility] OR;  Service: Radiology;  Laterality: N/A;  START 2:00 P.M.    REPEAT ABDOMINAL PARACENTESIS N/A 9/28/2020    Procedure: PARACENTESIS, ABDOMINAL, REPEAT;  Surgeon: Dosc Diagnostic Provider;  Location: [facility] OR;  Service: Radiology;  Laterality: N/A;  1 P.M. START    REPEAT ABDOMINAL PARACENTESIS N/A 11/3/2020    Procedure: PARACENTESIS, ABDOMINAL, REPEAT;  Surgeon: Dosc Diagnostic Provider;  Location: [facility] OR;  Service: Radiology;  Laterality: N/A;  11AM START    REPEAT ABDOMINAL PARACENTESIS N/A 11/13/2020    Procedure:  PARACENTESIS, ABDOMINAL, REPEAT;  Surgeon: Dosc Diagnostic Provider;  Location: [facility] OR;  Service: Radiology;  Laterality: N/A;  12PM START    REPEAT ABDOMINAL PARACENTESIS N/A 11/23/2020    Procedure: PARACENTESIS, ABDOMINAL, REPEAT;  Surgeon: Dosc Diagnostic Provider;  [facility];  Service: Radiology;  Laterality: N/A;    REPEAT ABDOMINAL PARACENTESIS N/A 12/1/2020    Procedure: PARACENTESIS, ABDOMINAL, REPEAT;  Surgeon: Dosc Diagnostic Provider;  Location: [facility] OR;  Service: Radiology;  Laterality: N/A;  11AM START    REPEAT ABDOMINAL PARACENTESIS N/A 12/8/2020    Procedure: PARACENTESIS, ABDOMINAL, REPEAT;  Surgeon: Dosc Diagnostic Provider;  Location: [facility] OR;  Service: Radiology;  Laterality: N/A;  93AM START    REPEAT ABDOMINAL PARACENTESIS N/A 12/17/2020    Procedure: PARACENTESIS, ABDOMINAL, REPEAT;  Surgeon: Dosc Diagnostic Provider;  Location: [facility] OR;  Service: Radiology;  Laterality: N/A;  10AM START    REPEAT ABDOMINAL PARACENTESIS N/A 12/30/2020    Procedure: PARACENTESIS, ABDOMINAL, REPEAT;  Surgeon: Dosc Diagnostic Provider;  Location: [facility] OR;  Service: Radiology;  Laterality: N/A;  9 A.M. START    REPEAT ABDOMINAL PARACENTESIS N/A 1/12/2021    Procedure: PARACENTESIS, ABDOMINAL, REPEAT;  Surgeon: Dos Diagnostic Provider;  Location: [facility] OR;  Service: Radiology;  Laterality: N/A;  10 AM START    REPEAT ABDOMINAL PARACENTESIS N/A 2/10/2021    Procedure: PARACENTESIS, ABDOMINAL, REPEAT;  Surgeon: Dosc Diagnostic Provider;  Location: [facility] OR;  Service: Radiology;  Laterality: N/A;  2 P.M. START    REPEAT ABDOMINAL PARACENTESIS N/A 2/18/2021    Procedure: PARACENTESIS, ABDOMINAL, REPEAT;  Surgeon: Dosc Diagnostic Provider;  Location: [facility] OR;  Service: Radiology;  Laterality: N/A;  230PM START    REPEAT ABDOMINAL PARACENTESIS N/A 2/26/2021    Procedure: PARACENTESIS, ABDOMINAL, REPEAT;  Surgeon: Dosc Diagnostic Provider;  [facility];  Service: Radiology;  Laterality: N/A;     REPEAT ABDOMINAL PARACENTESIS N/A 3/4/2021    Procedure: PARACENTESIS, ABDOMINAL, REPEAT;  Surgeon: Dosc Diagnostic Provider;  Location: [facility] OR;  Service: Radiology;  Laterality: N/A;  9AM START    REPEAT ABDOMINAL PARACENTESIS N/A 3/12/2021    Procedure: PARACENTESIS, ABDOMINAL, REPEAT;  Surgeon: Dosc Diagnostic Provider;  Location: [facility] OR;  Service: Radiology;  Laterality: N/A;  10:00 START TIME  NO PRE-OP REQUIRED    REPEAT ABDOMINAL PARACENTESIS N/A 4/7/2021    Procedure: PARACENTESIS, ABDOMINAL, REPEAT;  Surgeon: Dosc Diagnostic Provider;  Location: [facility] OR;  Service: Radiology;  Laterality: N/A;  1 P.M. START    REPEAT ABDOMINAL PARACENTESIS N/A 4/16/2021    Procedure: PARACENTESIS, ABDOMINAL, REPEAT;  Surgeon: Dos Diagnostic Provider;  Location: [facility] OR;  Service: Radiology;  Laterality: N/A;  9:OO START  NO PRE-OP REQ'D    REPEAT ABDOMINAL PARACENTESIS N/A 4/26/2021    Procedure: PARACENTESIS, ABDOMINAL, REPEAT;  Surgeon: Dos Diagnostic Provider;  Location: [facility] OR;  Service: Radiology;  Laterality: N/A;  9AM START    REPEAT ABDOMINAL PARACENTESIS N/A 5/6/2021    Procedure: PARACENTESIS, ABDOMINAL, REPEAT;  Surgeon: Dos Diagnostic Provider;  Location: [facility] OR;  Service: Radiology;  Laterality: N/A;  3PM START    REPEAT ABDOMINAL PARACENTESIS N/A 5/14/2021    Procedure: PARACENTESIS, ABDOMINAL, REPEAT;  Surgeon: Dosc Diagnostic Provider;  Location: [facility] OR;  Service: Radiology;  Laterality: N/A;  1:00PM START  NO PRE-OP REQ'D    REPEAT ABDOMINAL PARACENTESIS N/A 5/24/2021    Procedure: PARACENTESIS, ABDOMINAL, REPEAT;  Surgeon: Dosc Diagnostic Provider;  Location: [facility] OR;  Service: Radiology;  Laterality: N/A;  2 P.M START TIME    REPEAT ABDOMINAL PARACENTESIS N/A 6/4/2021    Procedure: PARACENTESIS, ABDOMINAL, REPEAT;  Surgeon: Dosc Diagnostic Provider;  Location: [facility] OR;  Service: Radiology;  Laterality: N/A;  11AM START    REPEAT ABDOMINAL PARACENTESIS N/A 6/14/2021    Procedure:  PARACENTESIS, ABDOMINAL, REPEAT;  Surgeon: [facility] Diagnostic Provider;  Location: [facility] OR;  Service: Radiology;  Laterality: N/A;  8AM START    REPEAT ABDOMINAL PARACENTESIS N/A 6/23/2021    Procedure: PARACENTESIS, ABDOMINAL, REPEAT;  Surgeon: [facility] Diagnostic Provider;  Location: [facility] OR;  Service: Radiology;  Laterality: N/A;    REVISION OF PROCEDURE INVOLVING GLAUCOMA DRAINAGE DEVICE Left 10/2/2019    EXTRUDING BAERVELDT /WITH PERICARDIAL PATCH GRAFT ()    Right foot surgery  10/2014    TOE AMPUTATION Right     first and second    TOE AMPUTATION Left 11/16/2018    Procedure: AMPUTATION, TOES  2-5;  Surgeon: Mitch Chan MD;  Location: [facility] OR;  Service: Vascular;  Laterality: Left;    TOE AMPUTATION Left 12/5/2018    Procedure: AMPUTATION, TOE;  Surgeon: Mitch Chan MD;  Location: [facility] OR;  Service: Vascular;  Laterality: Left;  left great toe    TONSILLECTOMY       Home Meds:   Prior to Admission medications    Medication Sig Start Date End Date Taking? Authorizing Provider   acetaminophen (TYLENOL) 325 MG tablet Take 650 mg by mouth every 6 (six) hours as needed for Pain.    Historical Provider   allopurinoL (ZYLOPRIM) 300 MG tablet Take 1 tablet (300 mg total) by mouth once daily. 9/28/21   Rubén Davis MD   aspirin (ECOTRIN) 81 MG EC tablet Take 81 mg by mouth once daily.    Historical Provider   blood sugar diagnostic Strp To check BG 2 times daily, to use with insurance preferred meter. e11.65 1/18/22   Sheryl Madison NP   blood-glucose meter kit To check BG 2 times daily, to use with insurance preferred meter 1/18/22 1/18/23  Sheryl Madison NP   brimonidine 0.2% (ALPHAGAN) 0.2 % Drop INSTILL 1 DROP IN BOTH EYES THREE TIMES DAILY 1/9/22   Perla Cortés MD   carvediloL (COREG) 3.125 MG tablet TAKE 1 TABLET(3.125 MG) BY MOUTH TWICE DAILY 1/4/22   Rubén Davis MD   clopidogreL (PLAVIX) 75 mg tablet Take 1 tablet (75 mg total) by mouth once daily. 1/14/21 1/14/22   "Mitch Chan MD   coenzyme Q10 100 mg capsule Take 100 mg by mouth every morning.    Historical Provider   collagenase (SANTYL) ointment Apply topically to diabetic ulcer on lower right leg once daily. 4/13/21   Rubén Davis MD   dorzolamide-timolol 2-0.5% (COSOPT) 22.3-6.8 mg/mL ophthalmic solution Place 1 drop into both eyes 3 (three) times daily. 12/16/21   Perla Cortés MD   ezetimibe (ZETIA) 10 mg tablet TAKE 1 TABLET(10 MG) BY MOUTH EVERY DAY 12/30/21   Rubén Davis MD   fish oil-omega-3 fatty acids 300-1,000 mg capsule Take 1 capsule by mouth 2 (two) times daily. 1/23/19   Sara Ching MD   gabapentin (NEURONTIN) 100 MG capsule TAKE 2 CAPSULES(200 MG) BY MOUTH EVERY EVENING 10/8/21   Rubén Davis MD   HYDROcodone-acetaminophen (NORCO) 5-325 mg per tablet Take 1 tablet by mouth every 12 (twelve) hours as needed for Pain. 9/21/21   Rubén Davis MD   insulin degludec-liraglutide (XULTOPHY 100/3.6) 100 unit-3.6 mg /mL (3 mL) Pen Inject 35 Units into the skin once daily. 1/18/22   Sheryl Madison NP   lancets Misc To check BG 2 times daily, to use with insurance preferred meter. Dx code e11.65 1/18/22   Sheryl Madison NP   loratadine (CLARITIN) 10 mg tablet Take 10 mg by mouth daily as needed for Allergies.    Historical Provider   metOLazone (ZAROXOLYN) 2.5 MG tablet TAKE 1 TABLET(2.5 MG) BY MOUTH EVERY DAY 11/30/21   Rubén Davis MD   MULTIVIT,THER IRON,CA,FA & MIN (MULTIVITAMIN) Tab Take 1 tablet by mouth every morning.     Historical Provider   pen needle, diabetic (BD ULTRA-FINE AMADOR PEN NEEDLE) 32 gauge x 5/32" Ndle 1 Device by Misc.(Non-Drug; Combo Route) route 2 (two) times a day. 9/11/20   Sheryl Madison NP   torsemide (DEMADEX) 20 MG Tab TAKE 4 TABLETS(80 MG) BY MOUTH TWICE DAILY 1/2/22   Rubén Davis MD     Anticoagulants/Antiplatelets: no anticoagulation    Allergies:   Review of patient's allergies indicates:   Allergen Reactions    Statins-hmg-coa reductase " inhibitors      Generalized Pain    Onglyza [saxagliptin]     Penicillins Rash     Sedation History:  no adverse reactions     Review of Systems:   Hematological: no known coagulopathies  Respiratory: positive for - orthopnea and shortness of breath  Cardiovascular: positive for - dyspnea on exertion, orthopnea and shortness of breath  Gastrointestinal: positive for - abdominal pain and distension  Genito-Urinary: no dysuria, trouble voiding, or hematuria  Musculoskeletal: negative  Neurological: no TIA or stroke symptoms      OBJECTIVE:     Vital Signs (Most Recent)     Physical Exam:  General: no acute distress  Mental Status: alert and oriented to person, place and time  HEENT: normocephalic, atraumatic  Chest: mild labored breathing  Heart: regular heart rate  Abdomen: +distended with +fluid-wave. No TTP/r/g.  Extremity: moves all extremities    Laboratory  Lab Results   Component Value Date    INR 1.0 08/05/2021       Lab Results   Component Value Date    WBC 4.58 12/14/2021    HGB 14.1 12/14/2021    HCT 44.3 12/14/2021    MCV 96 12/14/2021     12/14/2021      Lab Results   Component Value Date     (H) 12/14/2021     12/14/2021    K 3.5 12/14/2021    CL 98 12/14/2021    CO2 32 (H) 12/14/2021    BUN 59 (H) 12/14/2021    CREATININE 1.3 12/14/2021    CALCIUM 8.6 (L) 12/14/2021    MG 2.1 05/31/2019    ALT 18 12/14/2021    AST 20 12/14/2021    ALBUMIN 2.7 (L) 12/14/2021    BILITOT 0.4 12/14/2021     ASSESSMENT/PLAN:     62 y/o M with CKD III and combined systolic/diastolic CHF complicated by recurrent abdominal distension and pain 2/2 recurrent painful, tense ascites requiring frequent therapeutic large-volume paracentesis.        1. Recurrent painful, tense ascites - Will attempt US-guided therapeutic percutaneous RLQ-approacch paracentesis with local anesthetic only and albumin infusion post PRN as indicated per institutional protocol.      Risks (including, but not limited to, pain,  bleeding, infection, damage to nearby structures, failure to obtain sufficient material for a diagnosis, the need for additional procedures, and death), benefits, and alternatives were discussed with the patient. All questions were answered to the best of my abilities. The patient wishes to proceed with the procedure. Written informed consent was obtained.     Thank you for considering IR for the care of your patient.      Zach Cha MD  Interventional Radiology

## 2022-02-03 NOTE — BRIEF OP NOTE
Radiology Post-Procedure Note     Pre Op Diagnosis: Recurrent painful, tense ascites  Post Op Diagnosis: Same     Procedure: 1. US-guided percutaneous RLQ-approach therapeutic LVP     Procedure performed by: Zach Cha MD     Written Informed Consent Obtained: Yes  Specimen Removed: YES, 4,600-L of slightly cloudy, serous fluid  Estimated Blood Loss: none     Findings:   Successful US-guided percutaneous RLQ-approach therapeutic LVP with local anesthetic only and albumin infusion post PRN as indicated per institutional protocol. Patient tolerated the procedure well. No immediate post-procedural complications noted.      Thank you for considering IR for the care of your patient.      Zach Cha MD  Interventional Radiology

## 2022-02-10 NOTE — H&P
Radiology History & Physical      SUBJECTIVE:     Chief Complaint: Recurrent painful, tense ascites      History of Present Illness:  Marvin Ray is a 61 y.o. male with PMHx of CKD III and combined systolic/diastolic CHF complicated by recurrent abdominal distension and pain 2/2 recurrent painful, tense ascites requiring frequent  therapeutic large-volume paracentesis.      A new outpatient IR consult placed for US-guided percutaneous RLQ-approach therapeutic LVP.    Past Medical History:   Diagnosis Date    Arthritis     Cellulitis     CKD (chronic kidney disease), stage III     Coronary artery disease     Diabetes mellitus     Diabetic retinopathy     Diabetic ulcer of left foot     Glaucoma     Gout     Hyperlipemia     Hypertension     ICD (implantable cardioverter-defibrillator) in place 11/02/2018    Left chest    Non-pressure chronic ulcer of other part of left foot with fat layer exposed 10/23/2018    PVD (peripheral vascular disease)     Type 2 diabetes mellitus with left diabetic foot ulcer 10/29/2018    Unsteady gait     uses a wheelchair     Past Surgical History:   Procedure Laterality Date    AHMED GLAUCOMA IMPLANT Left 2011    DONE AT [facility]    AORTOGRAPHY WITH SERIALOGRAPHY Left 4/30/2019    Procedure: AORTOGRAM, WITH SERIALOGRAPHY, LEFT LOWER EXTREMITY, POSSIBLE INTERVENTION;  Surgeon: Mitch Chan MD;  Location: [facility] OR;  Service: Vascular;  Laterality: Left;  RN PRE OP 4-23-19.  NO H & P, No Cosent  [place]    AORTOGRAPHY WITH SERIALOGRAPHY Right 10/6/2020    Procedure: AORTOGRAM, WITH SERIALOGRAPHY, RIGHT LOWER EXTREMITY ANGIOGRAM, POSSIBLE INTERVENTION;  Surgeon: Mitch Chan MD;  Location: [facility] OR;  Service: Vascular;  Laterality: Right;  RN PREOP 10/1/2020--COVID NEGATIVE ON 10/5    AORTOGRAPHY WITH SERIALOGRAPHY Right 1/13/2021    Procedure: AORTOGRAM, WITH RIGHT LOWER EXTREMITY ANGIOGRAM, POSSIBLE INTERVENTION;  Surgeon: Mitch Chan MD;   Location: [facility] OR;  Service: Vascular;  Laterality: Right;  1PM START----NEED CONSENT  RN PREOP 1/8/2021--COVID NEGATIVE ON 1/12    AORTOGRAPHY WITH SERIALOGRAPHY Right 1/26/2021    Procedure: AORTOGRAM, RIGHT LOWER EXTREMITY ANGIOGRAM, POSSIBLE INTERVENTION;  Surgeon: Mitch Chan MD;  Location: [facility] OR;  Service: Vascular;  Laterality: Right;  RN PREOP 1/25/2021, COVID NEGATIVE ON 1/25, cxr, and ecg done    AORTOGRAPHY WITH SERIALOGRAPHY Right 3/30/2021    Procedure: AORTOGRAM, WITH RIGHT LOWER EXTREMITY ANGIOGRAM, POSSIBLE INTERVENTION;  Surgeon: Mitch Chan MD;  Location: [facility] OR;  Service: Vascular;  Laterality: Right;  RN PREOP 3/26/2021  COVID NEGATIVE    BAERVELDT GLAUCOMA IMPLANT Left 2012    WITH CATARACT EXTRACTION//DONE AT [facility]    BELOW KNEE AMPUTATION OF LOWER EXTREMITY Right 5/17/2021    Procedure: AMPUTATION, BELOW KNEE;  Surgeon: Christiana Pritchett MD;  Location: [facility] OR;  Service: Orthopedics;  Laterality: Right;  WOUND VAC -PREVENA  SUPINE---HAS--ICD- Iast interrogation -3/21  AMPUTATION TRAY  1ST CASE OKWHITNEY WHITLOCK  RN PREOP 5/13/2021     COVID --NEGATIVE ON 5/14---INCOMPLETE H/P    BIOPSY Right 12/18/2020    Procedure: Biopsy- trocar biopsy;  Surgeon: Brianna Champion DPM;  Location: [facility] OR;  Service: Podiatry;  Laterality: Right;    CARDIAC CATHETERIZATION Left 05/2016    CARDIAC DEFIBRILLATOR PLACEMENT Left 11/02/2018    CATARACT EXTRACTION W/  INTRAOCULAR LENS IMPLANT Left 2012    WITH BAERVEDT (DONE AT [facility])    CATARACT EXTRACTION W/  INTRAOCULAR LENS IMPLANT Right 09/26/2018    COMPLEX ()    CB DESTRUCTION WITH CYCLO G6 Left 02/15/2017        CYST REMOVAL      DEBRIDEMENT OF FOOT  12/28/2018    Procedure: DEBRIDEMENT, FOOT;  Surgeon: Mitch Chan MD;  Location: [facility] OR;  Service: Vascular;;    DEBRIDEMENT OF FOOT  6/5/2019    Procedure: DEBRIDEMENT, FOOT;  Surgeon: Mitch Chan MD;  Location: [facility] OR;  Service:  Vascular;;    DEBRIDEMENT OF FOOT Right 3/5/2021    Procedure: DEBRIDEMENT, FOOT with Misonic device;  Surgeon: Mitch Wall MD;  Location: [facility] OR;  Service: Vascular;  Laterality: Right;    EXAMINATION UNDER ANESTHESIA Left 12/5/2018    Procedure: Exam under anesthesia, left foot debridement, washout and all other indicated procedures;  Surgeon: Mitch Wall MD;  Location: [facility] OR;  Service: Vascular;  Laterality: Left;  1030AM START  RN PREOP 12/3/2018-----AICD  SEEN BY DR JAMES 12/4    EXAMINATION UNDER ANESTHESIA Left 2/13/2019    Procedure: LEFT FOOT WOUND DEBRIDEMENT, WASHOUT AND ALL OTHER INDICATED PROCEDURES;  Surgeon: Mitch Wall MD;  Location: [facility] OR;  Service: Vascular;  Laterality: Left;  requesting 1st case start  THIS CASE 1ST PER DR WALL ON 2/1/19 @ 844AM -LO  RN PREOP 2/6/2019  HAS CARDIAC CLEARANCE    EXAMINATION UNDER ANESTHESIA Left 12/28/2018    Procedure: Exam under anesthesia, left foot debridement, left foot washout, possible left second through fifth metatarsal resection;  Surgeon: Mitch Wall MD;  Location: [facility] OR;  Service: Vascular;  Laterality: Left;  1:00pm start per julissa @ 8:58am  RN  PHONE PRE OP 12-27-18--=Pt coming from [facility]  NEED CONSENT    EXAMINATION UNDER ANESTHESIA Left 6/5/2019    Procedure: LEFT FOOT DEBRIDEMENT, WASHOUT AND ALL OTHER INDICATED PROCEDURES;  Surgeon: Mitch Wall MD;  Location: [facility] OR;  Service: Vascular;  Laterality: Left;  RN PRE OP 5-31-19------ICD------NEED CONSENT    EXAMINATION UNDER ANESTHESIA Right 11/9/2020    Procedure: RIGHT FOOT DEBRIDEMENT, WASHOUT AND ALL OTHER INDICATED PROCEDURES;  Surgeon: Mitch Wall MD;  Location: [facility] OR;  Service: Vascular;  Laterality: Right;  RN PREOP 10/27/2020, --COVID NEGATIVE ON 11/6, has cardiac clearance/[facility] 10/27/2020, pt has ICD  NEEDS ORDERS    EXAMINATION UNDER ANESTHESIA Right 2/4/2021    Procedure: RIGHT FOOT DEBRIDEMENT,  POSSIBLE CALCANECTOMY, POSSIBLE FIFTH TOE AMPUTATION, WASHOUT AND ALL OTHER INDICATED PROCEDURES;  Surgeon: Mitch Chan MD;  Location: [facility] OR;  Service: Vascular;  Laterality: Right;  RN PREOP 2/2/2021--COVID NEGATIVE ON 2/2  ICD    EYE SURGERY      CAT EXT OU    FOOT AMPUTATION THROUGH METATARSAL Right 3/5/2021    Procedure: Right foot wound debridement, possible transmetatarsal amputation, possible fifth toe amputation, washout;  Surgeon: Mitch Chan MD;  Location: [facility] OR;  Service: Vascular;  Laterality: Right;  MISONIX SONIC ONE JAYDEN LOUISE [phone] SPOKE TO HIM ON MY OFFICE @ 7:31AM ON 2-  RN PRE Op, Covid NEGATIVE ON  3-2-21.  C A  8:30AM START----NEED H/P    INCISION AND DRAINAGE Left 11/14/2018    Procedure: Incision and Drainage, left lower extremity debridement, washout;  Surgeon: Mitch Chan MD;  Location: [facility] OR;  Service: Vascular;  Laterality: Left;    INCISION AND DRAINAGE Left 11/16/2018    Procedure: INCISION AND DRAINAGE;  Surgeon: Mitch Chan MD;  Location: [facility] OR;  Service: Vascular;  Laterality: Left;    INCISION AND DRAINAGE Right 11/18/2020    Procedure: Debridement and washout right lower extremity wounds;  Surgeon: Mitch Chan MD;  Location: [facility] OR 2ND FLR;  Service: Vascular;  Laterality: Right;    INCISION AND DRAINAGE Right 1/27/2021    Procedure: RIGHT FOOT DEBRIDEMENT, WASHOUT AND ALL OTHER INDICATED PROCEDURES;  Surgeon: Mitch Chan MD;  Location: [facility] OR 2ND FLR;  Service: Vascular;  Laterality: Right;    INTRAOCULAR PROSTHESES INSERTION Right 9/26/2018    Procedure: INSERTION, IOL PROSTHESIS;  Surgeon: Pelra oCrtés MD;  Location: [facility] OR 1ST FLR;  Service: Ophthalmology;  Laterality: Right;    PERITONEOCENTESIS N/A 3/1/2019    Procedure: PARACENTESIS, ABDOMINAL, INITIAL;  Surgeon: [facility] Diagnostic Provider;  Location: [facility] OR;  Service: Radiology;  Laterality: N/A;    PERITONEOCENTESIS N/A  3/25/2019    Procedure: PARACENTESIS, ABDOMINAL, INITIAL;  Surgeon: Dosc Diagnostic Provider;  [facility] OR;  Service: Radiology;  Laterality: N/A;    PERITONEOCENTESIS N/A 7/22/2019    Procedure: PARACENTESIS, ABDOMINAL, INITIAL;  Surgeon: Dosc Diagnostic Provider;  [facility]: [facility] OR;  Service: Radiology;  Laterality: N/A;    PERITONEOCENTESIS N/A 8/16/2019    Procedure: PARACENTESIS, ABDOMINAL, INITIAL;  Surgeon: Dosc Diagnostic Provider;  [facility];  Service: Radiology;  Laterality: N/A;    PERITONEOCENTESIS N/A 9/26/2019    Procedure: PARACENTESIS, ABDOMINAL;  Surgeon: Dosc Diagnostic Provider;  [facility];  Service: Radiology;  Laterality: N/A;    PERITONEOCENTESIS N/A 10/11/2019    Procedure: PARACENTESIS, ABDOMINAL;  Surgeon: Dosc Diagnostic Provider;  [facility];  Service: Radiology;  Laterality: N/A;    PERITONEOCENTESIS N/A 10/31/2019    Procedure: PARACENTESIS, ABDOMINAL;  Surgeon: Dosc Diagnostic Provider;  [facility];  Service: Radiology;  Laterality: N/A;    PERITONEOCENTESIS N/A 11/18/2019    Procedure: PARACENTESIS, ABDOMINAL;  Surgeon: Dosc Diagnostic Provider;  [facility];  Service: Radiology;  Laterality: N/A;    PERITONEOCENTESIS N/A 12/16/2019    Procedure: PARACENTESIS, ABDOMINAL;  Surgeon: Dosc Diagnostic Provider;  [facility];  Service: Radiology;  Laterality: N/A;  2PM START    PERITONEOCENTESIS N/A 2/7/2020    Procedure: PARACENTESIS, ABDOMINAL;  Surgeon: Dosc Diagnostic Provider;  [facility];  Service: Radiology;  Laterality: N/A;  REQUESTED 10:30A START    PERITONEOCENTESIS N/A 2/19/2020    Procedure: PARACENTESIS, ABDOMINAL;  Surgeon: Dosc Diagnostic Provider;  [facility];  Service: Radiology;  Laterality: N/A;    PERITONEOCENTESIS N/A 2/28/2020    Procedure: PARACENTESIS, ABDOMINAL;  Surgeon: Dosc Diagnostic Provider;  Location: [facility] OR;  Service: Radiology;  Laterality: N/A;  REQUESTED 10AM START     PHACOEMULSIFICATION OF CATARACT Right 9/26/2018    Procedure: PHACOEMULSIFICATION, CATARACT;  Surgeon: Perla Cortés MD;  Location: [facility] OR [address] [place] [address];  Service: Ophthalmology;  Laterality: Right;    REPEAT ABDOMINAL PARACENTESIS N/A 2/11/2019    Procedure: PARACENTESIS, ABDOMINAL, REPEAT;  Surgeon: Dosc Diagnostic Provider;  Location: [facility] OR;  Service: Radiology;  Laterality: N/A;  1PM START    REPEAT ABDOMINAL PARACENTESIS N/A 9/12/2019    Procedure: PARACENTESIS, ABDOMINAL, REPEAT;  Surgeon: Dosc Diagnostic Provider;  Location: [facility] OR;  Service: Radiology;  Laterality: N/A;  9AM START    REPEAT ABDOMINAL PARACENTESIS N/A 12/2/2019    Procedure: PARACENTESIS, ABDOMINAL, REPEAT;  Surgeon: Dosc Diagnostic Provider;  Location: [facility] OR;  Service: Radiology;  Laterality: N/A;  3PM START    REPEAT ABDOMINAL PARACENTESIS N/A 1/10/2020    Procedure: PARACENTESIS, ABDOMINAL, REPEAT;  Surgeon: Dosc Diagnostic Provider;  Location: [facility] OR;  Service: Radiology;  Laterality: N/A;  1130AM START    REPEAT ABDOMINAL PARACENTESIS N/A 1/20/2020    Procedure: PARACENTESIS, ABDOMINAL, REPEAT;  Surgeon: Dosc Diagnostic Provider;  Location: [facility] OR;  Service: Radiology;  Laterality: N/A;  1PM START    REPEAT ABDOMINAL PARACENTESIS N/A 1/31/2020    Procedure: PARACENTESIS, ABDOMINAL, REPEAT;  Surgeon: Dosc Diagnostic Provider;  Location: [facility] OR;  Service: Radiology;  Laterality: N/A;  10AM START    REPEAT ABDOMINAL PARACENTESIS N/A 3/16/2020    Procedure: PARACENTESIS, ABDOMINAL, REPEAT;  Surgeon: Dosc Diagnostic Provider;  Location: [facility] OR;  Service: Radiology;  Laterality: N/A;  10AM START    REPEAT ABDOMINAL PARACENTESIS N/A 3/30/2020    Procedure: PARACENTESIS, ABDOMINAL, REPEAT;  Surgeon: Dosc Diagnostic Provider;  Location: [facility] OR;  Service: Radiology;  Laterality: N/A;    REPEAT ABDOMINAL PARACENTESIS N/A 4/9/2020    Procedure: PARACENTESIS, ABDOMINAL, REPEAT;  Surgeon: Dosc Diagnostic Provider;  Location: [facility]  OR;  Service: Radiology;  Laterality: N/A;  1130AM START    REPEAT ABDOMINAL PARACENTESIS N/A 5/8/2020    Procedure: PARACENTESIS, ABDOMINAL, REPEAT;  Surgeon: Dosc Diagnostic Provider;  Location: [facility] OR;  Service: Radiology;  Laterality: N/A;  9AM START    REPEAT ABDOMINAL PARACENTESIS N/A 5/21/2020    Procedure: PARACENTESIS, ABDOMINAL, REPEAT;  Surgeon: Dosc Diagnostic Provider;  Location: [facility] OR;  Service: Radiology;  Laterality: N/A;  10AM START    REPEAT ABDOMINAL PARACENTESIS N/A 6/5/2020    Procedure: PARACENTESIS, ABDOMINAL, REPEAT;  Surgeon: Dosc Diagnostic Provider;  Location: [facility] OR;  Service: Radiology;  Laterality: N/A;  NOON START    REPEAT ABDOMINAL PARACENTESIS N/A 7/10/2020    Procedure: PARACENTESIS, ABDOMINAL, REPEAT;  Surgeon: Dosc Diagnostic Provider;  Location: [facility] OR;  Service: Radiology;  Laterality: N/A;  1PM START    REPEAT ABDOMINAL PARACENTESIS N/A 8/20/2020    Procedure: PARACENTESIS, ABDOMINAL, REPEAT;  Surgeon: Dosc Diagnostic Provider;  Location: [facility] OR;  Service: Radiology;  Laterality: N/A;  1PM START    REPEAT ABDOMINAL PARACENTESIS N/A 9/4/2020    Procedure: PARACENTESIS, ABDOMINAL, REPEAT;  Surgeon: Dosc Diagnostic Provider;  Location: [facility] OR;  Service: Radiology;  Laterality: N/A;  11 AM START    REPEAT ABDOMINAL PARACENTESIS N/A 9/16/2020    Procedure: PARACENTESIS, ABDOMINAL, REPEAT;  Surgeon: Dosc Diagnostic Provider;  Location: [facility] OR;  Service: Radiology;  Laterality: N/A;  START 2:00 P.M.    REPEAT ABDOMINAL PARACENTESIS N/A 9/28/2020    Procedure: PARACENTESIS, ABDOMINAL, REPEAT;  Surgeon: Dosc Diagnostic Provider;  Location: [facility] OR;  Service: Radiology;  Laterality: N/A;  1 P.M. START    REPEAT ABDOMINAL PARACENTESIS N/A 11/3/2020    Procedure: PARACENTESIS, ABDOMINAL, REPEAT;  Surgeon: Dosc Diagnostic Provider;  Location: [facility] OR;  Service: Radiology;  Laterality: N/A;  11AM START    REPEAT ABDOMINAL PARACENTESIS N/A 11/13/2020    Procedure:  PARACENTESIS, ABDOMINAL, REPEAT;  Surgeon: Dosc Diagnostic Provider;  Location: [facility] OR;  Service: Radiology;  Laterality: N/A;  12PM START    REPEAT ABDOMINAL PARACENTESIS N/A 11/23/2020    Procedure: PARACENTESIS, ABDOMINAL, REPEAT;  Surgeon: Dosc Diagnostic Provider;  [facility];  Service: Radiology;  Laterality: N/A;    REPEAT ABDOMINAL PARACENTESIS N/A 12/1/2020    Procedure: PARACENTESIS, ABDOMINAL, REPEAT;  Surgeon: Dosc Diagnostic Provider;  Location: [facility] OR;  Service: Radiology;  Laterality: N/A;  11AM START    REPEAT ABDOMINAL PARACENTESIS N/A 12/8/2020    Procedure: PARACENTESIS, ABDOMINAL, REPEAT;  Surgeon: Dosc Diagnostic Provider;  Location: [facility] OR;  Service: Radiology;  Laterality: N/A;  93AM START    REPEAT ABDOMINAL PARACENTESIS N/A 12/17/2020    Procedure: PARACENTESIS, ABDOMINAL, REPEAT;  Surgeon: Dosc Diagnostic Provider;  Location: [facility] OR;  Service: Radiology;  Laterality: N/A;  10AM START    REPEAT ABDOMINAL PARACENTESIS N/A 12/30/2020    Procedure: PARACENTESIS, ABDOMINAL, REPEAT;  Surgeon: Dosc Diagnostic Provider;  Location: [facility] OR;  Service: Radiology;  Laterality: N/A;  9 A.M. START    REPEAT ABDOMINAL PARACENTESIS N/A 1/12/2021    Procedure: PARACENTESIS, ABDOMINAL, REPEAT;  Surgeon: Dos Diagnostic Provider;  Location: [facility] OR;  Service: Radiology;  Laterality: N/A;  10 AM START    REPEAT ABDOMINAL PARACENTESIS N/A 2/10/2021    Procedure: PARACENTESIS, ABDOMINAL, REPEAT;  Surgeon: Dosc Diagnostic Provider;  Location: [facility] OR;  Service: Radiology;  Laterality: N/A;  2 P.M. START    REPEAT ABDOMINAL PARACENTESIS N/A 2/18/2021    Procedure: PARACENTESIS, ABDOMINAL, REPEAT;  Surgeon: Dosc Diagnostic Provider;  Location: [facility] OR;  Service: Radiology;  Laterality: N/A;  230PM START    REPEAT ABDOMINAL PARACENTESIS N/A 2/26/2021    Procedure: PARACENTESIS, ABDOMINAL, REPEAT;  Surgeon: Dosc Diagnostic Provider;  [facility];  Service: Radiology;  Laterality: N/A;     REPEAT ABDOMINAL PARACENTESIS N/A 3/4/2021    Procedure: PARACENTESIS, ABDOMINAL, REPEAT;  Surgeon: Dosc Diagnostic Provider;  Location: [facility] OR;  Service: Radiology;  Laterality: N/A;  9AM START    REPEAT ABDOMINAL PARACENTESIS N/A 3/12/2021    Procedure: PARACENTESIS, ABDOMINAL, REPEAT;  Surgeon: Dosc Diagnostic Provider;  Location: [facility] OR;  Service: Radiology;  Laterality: N/A;  10:00 START TIME  NO PRE-OP REQUIRED    REPEAT ABDOMINAL PARACENTESIS N/A 4/7/2021    Procedure: PARACENTESIS, ABDOMINAL, REPEAT;  Surgeon: Dosc Diagnostic Provider;  Location: [facility] OR;  Service: Radiology;  Laterality: N/A;  1 P.M. START    REPEAT ABDOMINAL PARACENTESIS N/A 4/16/2021    Procedure: PARACENTESIS, ABDOMINAL, REPEAT;  Surgeon: Dos Diagnostic Provider;  Location: [facility] OR;  Service: Radiology;  Laterality: N/A;  9:OO START  NO PRE-OP REQ'D    REPEAT ABDOMINAL PARACENTESIS N/A 4/26/2021    Procedure: PARACENTESIS, ABDOMINAL, REPEAT;  Surgeon: Dos Diagnostic Provider;  Location: [facility] OR;  Service: Radiology;  Laterality: N/A;  9AM START    REPEAT ABDOMINAL PARACENTESIS N/A 5/6/2021    Procedure: PARACENTESIS, ABDOMINAL, REPEAT;  Surgeon: Dos Diagnostic Provider;  Location: [facility] OR;  Service: Radiology;  Laterality: N/A;  3PM START    REPEAT ABDOMINAL PARACENTESIS N/A 5/14/2021    Procedure: PARACENTESIS, ABDOMINAL, REPEAT;  Surgeon: Dosc Diagnostic Provider;  Location: [facility] OR;  Service: Radiology;  Laterality: N/A;  1:00PM START  NO PRE-OP REQ'D    REPEAT ABDOMINAL PARACENTESIS N/A 5/24/2021    Procedure: PARACENTESIS, ABDOMINAL, REPEAT;  Surgeon: Dosc Diagnostic Provider;  Location: [facility] OR;  Service: Radiology;  Laterality: N/A;  2 P.M START TIME    REPEAT ABDOMINAL PARACENTESIS N/A 6/4/2021    Procedure: PARACENTESIS, ABDOMINAL, REPEAT;  Surgeon: Dosc Diagnostic Provider;  Location: [facility] OR;  Service: Radiology;  Laterality: N/A;  11AM START    REPEAT ABDOMINAL PARACENTESIS N/A 6/14/2021    Procedure:  PARACENTESIS, ABDOMINAL, REPEAT;  Surgeon: [facility] Diagnostic Provider;  Location: [facility] OR;  Service: Radiology;  Laterality: N/A;  8AM START    REPEAT ABDOMINAL PARACENTESIS N/A 6/23/2021    Procedure: PARACENTESIS, ABDOMINAL, REPEAT;  Surgeon: [facility] Diagnostic Provider;  Location: [facility] OR;  Service: Radiology;  Laterality: N/A;    REVISION OF PROCEDURE INVOLVING GLAUCOMA DRAINAGE DEVICE Left 10/2/2019    EXTRUDING BAERVELDT /WITH PERICARDIAL PATCH GRAFT ()    Right foot surgery  10/2014    TOE AMPUTATION Right     first and second    TOE AMPUTATION Left 11/16/2018    Procedure: AMPUTATION, TOES  2-5;  Surgeon: Mitch Chan MD;  Location: [facility] OR;  Service: Vascular;  Laterality: Left;    TOE AMPUTATION Left 12/5/2018    Procedure: AMPUTATION, TOE;  Surgeon: Mitch Chan MD;  Location: [facility] OR;  Service: Vascular;  Laterality: Left;  left great toe    TONSILLECTOMY       Home Meds:   Prior to Admission medications    Medication Sig Start Date End Date Taking? Authorizing Provider   acetaminophen (TYLENOL) 325 MG tablet Take 650 mg by mouth every 6 (six) hours as needed for Pain.    Historical Provider   allopurinoL (ZYLOPRIM) 300 MG tablet Take 1 tablet (300 mg total) by mouth once daily. 9/28/21   Rubén Davis MD   aspirin (ECOTRIN) 81 MG EC tablet Take 81 mg by mouth once daily.    Historical Provider   blood sugar diagnostic Strp To check BG 2 times daily, to use with insurance preferred meter. e11.65 1/18/22   Sheryl Madison NP   blood-glucose meter kit To check BG 2 times daily, to use with insurance preferred meter 1/18/22 1/18/23  Sheryl Madison NP   brimonidine 0.2% (ALPHAGAN) 0.2 % Drop INSTILL 1 DROP IN BOTH EYES THREE TIMES DAILY 1/9/22   Perla Cortés MD   carvediloL (COREG) 3.125 MG tablet TAKE 1 TABLET(3.125 MG) BY MOUTH TWICE DAILY 1/4/22   Rubén Davis MD   clopidogreL (PLAVIX) 75 mg tablet Take 1 tablet (75 mg total) by mouth once daily. 1/14/21 1/14/22   "Mitch Chan MD   coenzyme Q10 100 mg capsule Take 100 mg by mouth every morning.    Historical Provider   collagenase (SANTYL) ointment Apply topically to diabetic ulcer on lower right leg once daily. 4/13/21   Rubén Davis MD   dorzolamide-timolol 2-0.5% (COSOPT) 22.3-6.8 mg/mL ophthalmic solution Place 1 drop into both eyes 3 (three) times daily. 12/16/21   Perla Cortés MD   ezetimibe (ZETIA) 10 mg tablet TAKE 1 TABLET(10 MG) BY MOUTH EVERY DAY 12/30/21   Rubén Davis MD   fish oil-omega-3 fatty acids 300-1,000 mg capsule Take 1 capsule by mouth 2 (two) times daily. 1/23/19   Sara Ching MD   gabapentin (NEURONTIN) 100 MG capsule TAKE 2 CAPSULES(200 MG) BY MOUTH EVERY EVENING 10/8/21   Rubén Davis MD   HYDROcodone-acetaminophen (NORCO) 5-325 mg per tablet Take 1 tablet by mouth every 12 (twelve) hours as needed for Pain. 9/21/21   Rubén Davis MD   insulin degludec-liraglutide (XULTOPHY 100/3.6) 100 unit-3.6 mg /mL (3 mL) Pen Inject 35 Units into the skin once daily. 1/18/22   Sheryl Madison NP   lancets Misc To check BG 2 times daily, to use with insurance preferred meter. Dx code e11.65 1/18/22   Sheryl Madison NP   loratadine (CLARITIN) 10 mg tablet Take 10 mg by mouth daily as needed for Allergies.    Historical Provider   metOLazone (ZAROXOLYN) 2.5 MG tablet TAKE 1 TABLET(2.5 MG) BY MOUTH EVERY DAY 11/30/21   Rubén Davis MD   MULTIVIT,THER IRON,CA,FA & MIN (MULTIVITAMIN) Tab Take 1 tablet by mouth every morning.     Historical Provider   pen needle, diabetic (BD ULTRA-FINE AMADOR PEN NEEDLE) 32 gauge x 5/32" Ndle 1 Device by Misc.(Non-Drug; Combo Route) route 2 (two) times a day. 9/11/20   Sheryl Madison NP   torsemide (DEMADEX) 20 MG Tab TAKE 4 TABLETS(80 MG) BY MOUTH TWICE DAILY 1/2/22   Rubén Davis MD     Anticoagulants/Antiplatelets: aspirin and Plavix    Allergies:   Review of patient's allergies indicates:   Allergen Reactions    Statins-hmg-coa reductase " inhibitors      Generalized Pain    Onglyza [saxagliptin]     Penicillins Rash     Sedation History:  no adverse reactions     Review of Systems:   Hematological: no known coagulopathies  Respiratory: positive for - orthopnea and shortness of breath  Cardiovascular: positive for - dyspnea on exertion, orthopnea and shortness of breath  Gastrointestinal: positive for - abdominal pain and distension  Genito-Urinary: no dysuria, trouble voiding, or hematuria  Musculoskeletal: negative  Neurological: no TIA or stroke symptoms      OBJECTIVE:     Vital Signs (Most Recent)     Physical Exam:  General: no acute distress  Mental Status: alert and oriented to person, place and time  HEENT: normocephalic, atraumatic  Chest: mild labored breathing  Heart: regular heart rate  Abdomen: +distended with +fluid-wave. No TTP/r/g.  Extremity: moves all extremities    Laboratory  Lab Results   Component Value Date    INR 1.0 08/05/2021       Lab Results   Component Value Date    WBC 4.58 12/14/2021    HGB 14.1 12/14/2021    HCT 44.3 12/14/2021    MCV 96 12/14/2021     12/14/2021      Lab Results   Component Value Date     (H) 12/14/2021     12/14/2021    K 3.5 12/14/2021    CL 98 12/14/2021    CO2 32 (H) 12/14/2021    BUN 59 (H) 12/14/2021    CREATININE 1.3 12/14/2021    CALCIUM 8.6 (L) 12/14/2021    MG 2.1 05/31/2019    ALT 18 12/14/2021    AST 20 12/14/2021    ALBUMIN 2.7 (L) 12/14/2021    BILITOT 0.4 12/14/2021     ASSESSMENT/PLAN:     60 y/o M with CKD III and combined systolic/diastolic CHF complicated by recurrent abdominal distension and pain 2/2 recurrent painful, tense ascites requiring frequent therapeutic large-volume paracentesis.        1. Recurrent painful, tense ascites - Will attempt US-guided percutaneous RLQ-approacchtherapeutic LVP with local anesthetic only and albumin infusion post PRN as indicated per institutional protocol.      Risks (including, but not limited to, pain, bleeding,  infection, damage to nearby structures, failure to obtain sufficient material for a diagnosis, the need for additional procedures, and death), benefits, and alternatives were discussed with the patient. All questions were answered to the best of my abilities. The patient wishes to proceed with the procedure. Written informed consent was obtained.     Thank you for considering IR for the care of your patient.      Zach Cha MD  Interventional Radiology

## 2022-02-10 NOTE — DISCHARGE SUMMARY
External photos, Right Eye (Today) Radiology Discharge Summary      Hospital Course: No complications    Admit Date: 2/10/2022  Discharge Date: 02/10/2022     Instructions Given to Patient: Yes  Diet: Resume prior diet  Activity: activity as tolerated    Description of Condition on Discharge: Good  Vital Signs (Most Recent): BP: 129/74 (02/10/22 1019)    Discharge Disposition: Home    Discharge Diagnosis:   60 y/o M with h/o recurrent painful, tense ascites s/p successful US-guided percutaneous RLQ-approach therapeutic LVP with local anesthetic only and albumin infusion post as indicated per institutional protocol. Patient tolerated the procedures well. No immediate post-procedural complications noted.      Thank you for considering IR for the care of your patient.      Zach Cha MD  Interventional Radiology

## 2022-02-10 NOTE — BRIEF OP NOTE
Radiology Post-Procedure Note     Pre Op Diagnosis: Recurrent painful, tense ascites  Post Op Diagnosis: Same     Procedure: 1. US-guided percutaneous RLQ-approach therapeutic LVP     Procedure performed by: Zach Cha MD     Written Informed Consent Obtained: Yes  Specimen Removed: YES, 6,000-L of slightly cloudy, serous fluid  Estimated Blood Loss: none     Findings:   Successful US-guided percutaneous RLQ-approach therapeutic LVP with local anesthetic only and albumin infusion post PRN as indicated per institutional protocol. Patient tolerated the procedure well. No immediate post-procedural complications noted.      Thank you for considering IR for the care of your patient.      Zach Cha MD  Interventional Radiology

## 2022-02-12 NOTE — TELEPHONE ENCOUNTER
No new care gaps identified.  Powered by [employer]. Reference number: [id number].   2/12/2022 4:17:16 AM CST

## 2022-02-15 NOTE — PROGRESS NOTES
[facility]                Progress Note    Subjective:       Patient ID: Marvin Ray is a 61 y.o. male.    Chief Complaint: Non-healing Wound Follow Up    Pt arrived to [facility] via  with caregiver at side. Pt able transfer self by SPT from >bed without any assistance. 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  There has been no new edema.  He is ambulating more and feels this is helping with prosthesis.          Review of Systems   Constitutional: Negative.    HENT: Negative.    Eyes: Negative.    Respiratory: Negative.    Cardiovascular: Positive for leg swelling.   Gastrointestinal: Negative.    Skin: Positive for wound.   Psychiatric/Behavioral: Negative.          Objective:        Physical Exam  Constitutional:       Appearance: Normal appearance.   HENT:      Head: Normocephalic and atraumatic.      Right Ear: External ear normal.      Left Ear: External ear normal.      Mouth/Throat:      Mouth: Mucous membranes are moist.      Pharynx: Oropharynx is clear.   Eyes:      Extraocular Movements: Extraocular movements intact.      Conjunctiva/sclera: Conjunctivae normal.      Pupils: Pupils are equal, round, and reactive to light.   Cardiovascular:      Rate and Rhythm: Normal rate and regular rhythm.   Pulmonary:      Effort: Pulmonary effort is normal. No respiratory distress.   Abdominal:      General: There is no distension.      Palpations: Abdomen is soft.   Skin:     General: Skin is warm and dry.      Comments: +DFU to left foot with maceration   Neurological:      Mental Status: He is alert.         Vitals:    02/15/22 1036   BP: 125/71   Pulse: 89   Resp: 16   Temp: 98.1 °F (36.7 °C)       Assessment:           ICD-10-CM ICD-9-CM   1. Wound of left foot  S91.302A 892.0            Wound 12/22/20 1608 Diabetic Ulcer Left distal Foot (Active)   12/22/20 1608    Pre-existing:    Primary Wound Type: Diabetic ulc   Side: Left   Orientation: distal   Location: Foot    Wound Number:    Ankle-Brachial Index:    Pulses:    Removal Indication and Assessment:    Wound Outcome:    (Retired) Wound Type:    (Retired) Wound Length (cm):    (Retired) Wound Width (cm):    (Retired) Depth (cm):    Wound Description (Comments):    Removal Indications:    Wound Image   02/15/22 1000   Wound WDL ex 02/15/22 1000   Dressing Appearance Moist drainage 02/15/22 1000   Drainage Amount Large 02/15/22 1000   Drainage Characteristics/Odor Serosanguineous 02/15/22 1000   Appearance Red 02/15/22 1000   Tissue loss description Full thickness 02/15/22 1000   Black (%), Wound Tissue Color 0 % 02/15/22 1000   Red (%), Wound Tissue Color 100 % 02/15/22 1000   Yellow (%), Wound Tissue Color 0 % 02/15/22 1000   Periwound Area Macerated;Scar tissue 02/15/22 1000   Wound Edges Open 02/15/22 1000   Wound Length (cm) 2.5 cm 02/15/22 1000   Wound Width (cm) 5.5 cm 02/15/22 1000   Wound Depth (cm) 0.2 cm 02/15/22 1000   Wound Volume (cm^3) 2.75 cm^3 02/15/22 1000   Wound Surface Area (cm^2) 13.75 cm^2 02/15/22 1000   Tunneling (depth (cm)/location) 0 02/15/22 1000   Undermining (depth (cm)/location) 0 02/15/22 1000   Care Cleansed with:;Sterile normal saline 02/15/22 1000   Dressing Applied 02/15/22 1000   Periwound Care Moisture barrier applied 02/15/22 1000   Off Loading Football dressing;Off loading shoe 02/15/22 1000            Wound 12/22/20 1305 Diabetic Ulcer Left medial Foot (Active)   12/22/20 1305    Pre-existing:    Primary Wound Type: Diabetic ulc   Side: Left   Orientation: medial   Location: Foot   Wound Number:    Ankle-Brachial Index:    Pulses:    Removal Indication and Assessment:    Wound Outcome:    (Retired) Wound Type:    (Retired) Wound Length (cm):    (Retired) Wound Width (cm):    (Retired) Depth (cm):    Wound Description (Comments):    Removal Indications:    Wound Image   02/15/22 1000   Wound WDL ex 02/15/22 1000   Dressing Appearance Moist drainage 02/15/22 1000   Drainage Amount  Moderate 02/15/22 1000   Drainage Characteristics/Odor Serosanguineous 02/15/22 1000   Appearance Red;Yellow 02/15/22 1000   Tissue loss description Full thickness 02/15/22 1000   Black (%), Wound Tissue Color 0 % 02/15/22 1000   Red (%), Wound Tissue Color 90 % 02/15/22 1000   Yellow (%), Wound Tissue Color 10 % 02/15/22 1000   Periwound Area Macerated;Scar tissue 02/15/22 1000   Wound Edges Open 02/15/22 1000   Wound Length (cm) 5.5 cm 02/15/22 1000   Wound Width (cm) 4.5 cm 02/15/22 1000   Wound Depth (cm) 0.3 cm 02/15/22 1000   Wound Volume (cm^3) 7.425 cm^3 02/15/22 1000   Wound Surface Area (cm^2) 24.75 cm^2 02/15/22 1000   Tunneling (depth (cm)/location) 0 02/15/22 1000   Undermining (depth (cm)/location) 0 02/15/22 1000   Care Cleansed with:;Sterile normal saline 02/15/22 1000   Dressing Applied 02/15/22 1000   Periwound Care Moisture barrier applied 02/15/22 1000   Off Loading Football dressing;Off loading shoe 02/15/22 1000            Wound 10/05/21 1043 Blister(s) Right distal Leg (Active)   10/05/21 1043    Pre-existing: No   Primary Wound Type: Blister(s)   Side: Right   Orientation: distal   Location: Leg   Wound Number:    Ankle-Brachial Index:    Pulses:    Removal Indication and Assessment:    Wound Outcome:    (Retired) Wound Type:    (Retired) Wound Length (cm):    (Retired) Wound Width (cm):    (Retired) Depth (cm):    Wound Description (Comments):    Removal Indications:    Appearance Other (see comments) 02/15/22 1000           Plan:                Orders Placed This Encounter   Procedures    Change dressing     Clean wound with NS. Gent Violet to periwound. Lt TMA site: Endoform, abd pad. Lt medial Foot: Endoform & Iodosorb, abd pad. Light Football (cast padding, stockinet). Darco shoe. Change every 2 days per sister at home using medihoney, or as needed if drainage increases with strike-through or increased periwound maceration noted. Pt to return to [facility] in 3 wks.        Follow up in about 3  weeks (around 3/8/2022) for Wound Care.   Rubén Davis MD

## 2022-02-17 NOTE — DISCHARGE SUMMARY
Radiology Discharge Summary      Hospital Course: No complications    Admit Date: 2/17/2022  Discharge Date: 02/17/2022     Instructions Given to Patient: Yes  Diet: Resume prior diet  Activity: activity as tolerated    Description of Condition on Discharge: Stable  Vital Signs (Most Recent): BP: 135/75 (02/17/22 1031)    Discharge Disposition: Home    Discharge Diagnosis: CKD 3, recurrent ascites s/p paracentesis     Follow-up: Continue periodic paracentesis.  Follow up with primary MD as needed.    Vin Kong MD  Staff Radiologist  Department of Radiology  Pager: [phone]

## 2022-02-17 NOTE — H&P
Radiology History & Physical      SUBJECTIVE:     Chief Complaint: recurrent ascites    History of Present Illness:  Marvin Ray is a 61 y.o. male who presents for paracentesis   Past Medical History:   Diagnosis Date    Arthritis     Cellulitis     CKD (chronic kidney disease), stage III     Coronary artery disease     Diabetes mellitus     Diabetic retinopathy     Diabetic ulcer of left foot     Glaucoma     Gout     Hyperlipemia     Hypertension     ICD (implantable cardioverter-defibrillator) in place 11/02/2018    Left chest    Non-pressure chronic ulcer of other part of left foot with fat layer exposed 10/23/2018    PVD (peripheral vascular disease)     Type 2 diabetes mellitus with left diabetic foot ulcer 10/29/2018    Unsteady gait     uses a wheelchair     Past Surgical History:   Procedure Laterality Date    AHMED GLAUCOMA IMPLANT Left 2011    DONE AT [facility]    AORTOGRAPHY WITH SERIALOGRAPHY Left 4/30/2019    Procedure: AORTOGRAM, WITH SERIALOGRAPHY, LEFT LOWER EXTREMITY, POSSIBLE INTERVENTION;  Surgeon: Mitch Chan MD;  Location: [facility] OR;  Service: Vascular;  Laterality: Left;  RN PRE OP 4-23-19.  NO H & P, No Cosent  [place]    AORTOGRAPHY WITH SERIALOGRAPHY Right 10/6/2020    Procedure: AORTOGRAM, WITH SERIALOGRAPHY, RIGHT LOWER EXTREMITY ANGIOGRAM, POSSIBLE INTERVENTION;  Surgeon: Mitch Chan MD;  Location: [facility] OR;  Service: Vascular;  Laterality: Right;  RN PREOP 10/1/2020--COVID NEGATIVE ON 10/5    AORTOGRAPHY WITH SERIALOGRAPHY Right 1/13/2021    Procedure: AORTOGRAM, WITH RIGHT LOWER EXTREMITY ANGIOGRAM, POSSIBLE INTERVENTION;  Surgeon: Mitch Chan MD;  Location: [facility] OR;  Service: Vascular;  Laterality: Right;  1PM START----NEED CONSENT  RN PREOP 1/8/2021--COVID NEGATIVE ON 1/12    AORTOGRAPHY WITH SERIALOGRAPHY Right 1/26/2021    Procedure: AORTOGRAM, RIGHT LOWER EXTREMITY ANGIOGRAM, POSSIBLE INTERVENTION;  Surgeon: Mitch Chan MD;   Location: [facility] OR;  Service: Vascular;  Laterality: Right;  RN PREOP 1/25/2021, COVID NEGATIVE ON 1/25, cxr, and ecg done    AORTOGRAPHY WITH SERIALOGRAPHY Right 3/30/2021    Procedure: AORTOGRAM, WITH RIGHT LOWER EXTREMITY ANGIOGRAM, POSSIBLE INTERVENTION;  Surgeon: Mitch Chan MD;  Location: [facility] OR;  Service: Vascular;  Laterality: Right;  RN PREOP 3/26/2021  COVID NEGATIVE    BAERVELDT GLAUCOMA IMPLANT Left 2012    WITH CATARACT EXTRACTION//DONE AT [facility]    BELOW KNEE AMPUTATION OF LOWER EXTREMITY Right 5/17/2021    Procedure: AMPUTATION, BELOW KNEE;  Surgeon: Christiana Pritchett MD;  Location: [facility] OR;  Service: Orthopedics;  Laterality: Right;  WOUND VAC -PREVENA  SUPINE---HAS--ICD- Iast interrogation -3/21  AMPUTATION TRAY  1ST CASE FLORA WHITLOCK  RN PREOP 5/13/2021     COVID --NEGATIVE ON 5/14---INCOMPLETE H/P    BIOPSY Right 12/18/2020    Procedure: Biopsy- trocar biopsy;  Surgeon: Brianna Champion DPM;  Location: [facility] OR;  Service: Podiatry;  Laterality: Right;    CARDIAC CATHETERIZATION Left 05/2016    CARDIAC DEFIBRILLATOR PLACEMENT Left 11/02/2018    CATARACT EXTRACTION W/  INTRAOCULAR LENS IMPLANT Left 2012    WITH BAERVEDT (DONE AT [facility])    CATARACT EXTRACTION W/  INTRAOCULAR LENS IMPLANT Right 09/26/2018    COMPLEX ()    CB DESTRUCTION WITH CYCLO G6 Left 02/15/2017        CYST REMOVAL      DEBRIDEMENT OF FOOT  12/28/2018    Procedure: DEBRIDEMENT, FOOT;  Surgeon: Mitch Chan MD;  Location: [facility] OR;  Service: Vascular;;    DEBRIDEMENT OF FOOT  6/5/2019    Procedure: DEBRIDEMENT, FOOT;  Surgeon: Mitch Chan MD;  Location: [facility] OR;  Service: Vascular;;    DEBRIDEMENT OF FOOT Right 3/5/2021    Procedure: DEBRIDEMENT, FOOT with Misonic device;  Surgeon: Mitch Chan MD;  Location: [facility] OR;  Service: Vascular;  Laterality: Right;    EXAMINATION UNDER ANESTHESIA Left 12/5/2018    Procedure: Exam under anesthesia, left foot  debridement, washout and all other indicated procedures;  Surgeon: Mitch Wall MD;  Location: [facility] OR;  Service: Vascular;  Laterality: Left;  1030AM START  RN PREOP 12/3/2018-----AICD  SEEN BY DR JAMES 12/4    EXAMINATION UNDER ANESTHESIA Left 2/13/2019    Procedure: LEFT FOOT WOUND DEBRIDEMENT, WASHOUT AND ALL OTHER INDICATED PROCEDURES;  Surgeon: Mitch Wall MD;  Location: [facility] OR;  Service: Vascular;  Laterality: Left;  requesting 1st case start  THIS CASE 1ST PER DR WALL ON 2/1/19 @ 844AM -LO  RN PREOP 2/6/2019  HAS CARDIAC CLEARANCE    EXAMINATION UNDER ANESTHESIA Left 12/28/2018    Procedure: Exam under anesthesia, left foot debridement, left foot washout, possible left second through fifth metatarsal resection;  Surgeon: Mitch Wall MD;  Location: [facility] OR;  Service: Vascular;  Laterality: Left;  1:00pm start per julissa @ 8:58am  RN  PHONE PRE OP 12-27-18--=Pt coming from [facility]  NEED CONSENT    EXAMINATION UNDER ANESTHESIA Left 6/5/2019    Procedure: LEFT FOOT DEBRIDEMENT, WASHOUT AND ALL OTHER INDICATED PROCEDURES;  Surgeon: Mitch Wall MD;  Location: [facility] OR;  Service: Vascular;  Laterality: Left;  RN PRE OP 5-31-19------ICD------NEED CONSENT    EXAMINATION UNDER ANESTHESIA Right 11/9/2020    Procedure: RIGHT FOOT DEBRIDEMENT, WASHOUT AND ALL OTHER INDICATED PROCEDURES;  Surgeon: Mitch Wall MD;  Location: [facility] OR;  Service: Vascular;  Laterality: Right;  RN PREOP 10/27/2020, --COVID NEGATIVE ON 11/6, has cardiac clearance/[place] 10/27/2020, pt has ICD  NEEDS ORDERS    EXAMINATION UNDER ANESTHESIA Right 2/4/2021    Procedure: RIGHT FOOT DEBRIDEMENT, POSSIBLE CALCANECTOMY, POSSIBLE FIFTH TOE AMPUTATION, WASHOUT AND ALL OTHER INDICATED PROCEDURES;  Surgeon: Mitch Wall MD;  Location: [facility] OR;  Service: Vascular;  Laterality: Right;  RN PREOP 2/2/2021--COVID NEGATIVE ON 2/2  ICD    EYE SURGERY      CAT EXT OU    FOOT AMPUTATION  THROUGH METATARSAL Right 3/5/2021    Procedure: Right foot wound debridement, possible transmetatarsal amputation, possible fifth toe amputation, washout;  Surgeon: Mitch Chan MD;  Location: [facility] OR;  Service: Vascular;  Laterality: Right;  MISONIX SONIC ONE JAYDEN LOUISE [phone] SPOKE TO HIM ON MY OFFICE @ 7:31AM ON 2-  RN PRE Op, Covid NEGATIVE ON  3-2-21.  C A  8:30AM START----NEED H/P    INCISION AND DRAINAGE Left 11/14/2018    Procedure: Incision and Drainage, left lower extremity debridement, washout;  Surgeon: Mitch Chan MD;  Location: [facility] OR;  Service: Vascular;  Laterality: Left;    INCISION AND DRAINAGE Left 11/16/2018    Procedure: INCISION AND DRAINAGE;  Surgeon: Mitch Chan MD;  Location: [facility] OR;  Service: Vascular;  Laterality: Left;    INCISION AND DRAINAGE Right 11/18/2020    Procedure: Debridement and washout right lower extremity wounds;  Surgeon: Mitch Chan MD;  Location: [facility] OR 2ND FLR;  Service: Vascular;  Laterality: Right;    INCISION AND DRAINAGE Right 1/27/2021    Procedure: RIGHT FOOT DEBRIDEMENT, WASHOUT AND ALL OTHER INDICATED PROCEDURES;  Surgeon: Mitch Chan MD;  Location: [facility] OR 2ND FLR;  Service: Vascular;  Laterality: Right;    INTRAOCULAR PROSTHESES INSERTION Right 9/26/2018    Procedure: INSERTION, IOL PROSTHESIS;  Surgeon: Perla Cortés MD;  Location: [facility] OR 1ST FLR;  Service: Ophthalmology;  Laterality: Right;    PERITONEOCENTESIS N/A 3/1/2019    Procedure: PARACENTESIS, ABDOMINAL, INITIAL;  Surgeon: Dosc Diagnostic Provider;  Location: [facility] OR;  Service: Radiology;  Laterality: N/A;    PERITONEOCENTESIS N/A 3/25/2019    Procedure: PARACENTESIS, ABDOMINAL, INITIAL;  Surgeon: Dosc Diagnostic Provider;  Location: [facility] OR;  Service: Radiology;  Laterality: N/A;    PERITONEOCENTESIS N/A 7/22/2019    Procedure: PARACENTESIS, ABDOMINAL, INITIAL;  Surgeon: Dosc Diagnostic Provider;  Location: [facility] OR;   Service: Radiology;  Laterality: N/A;    PERITONEOCENTESIS N/A 8/16/2019    Procedure: PARACENTESIS, ABDOMINAL, INITIAL;  Surgeon: Dosc Diagnostic Provider;  Location: [facility] OR;  Service: Radiology;  Laterality: N/A;    PERITONEOCENTESIS N/A 9/26/2019    Procedure: PARACENTESIS, ABDOMINAL;  Surgeon: Dosc Diagnostic Provider;  Location: [facility] OR;  Service: Radiology;  Laterality: N/A;    PERITONEOCENTESIS N/A 10/11/2019    Procedure: PARACENTESIS, ABDOMINAL;  Surgeon: Dosc Diagnostic Provider;  Location: [facility] OR;  Service: Radiology;  Laterality: N/A;    PERITONEOCENTESIS N/A 10/31/2019    Procedure: PARACENTESIS, ABDOMINAL;  Surgeon: Dosc Diagnostic Provider;  Location: [facility] OR;  Service: Radiology;  Laterality: N/A;    PERITONEOCENTESIS N/A 11/18/2019    Procedure: PARACENTESIS, ABDOMINAL;  Surgeon: Dosc Diagnostic Provider;  Location: [facility] OR;  Service: Radiology;  Laterality: N/A;    PERITONEOCENTESIS N/A 12/16/2019    Procedure: PARACENTESIS, ABDOMINAL;  Surgeon: Dosc Diagnostic Provider;  Location: [facility] OR;  Service: Radiology;  Laterality: N/A;  2PM START    PERITONEOCENTESIS N/A 2/7/2020    Procedure: PARACENTESIS, ABDOMINAL;  Surgeon: Dosc Diagnostic Provider;  Location: [facility] OR;  Service: Radiology;  Laterality: N/A;  REQUESTED 10:30A START    PERITONEOCENTESIS N/A 2/19/2020    Procedure: PARACENTESIS, ABDOMINAL;  Surgeon: Dosc Diagnostic Provider;  Location: [facility] OR;  Service: Radiology;  Laterality: N/A;    PERITONEOCENTESIS N/A 2/28/2020    Procedure: PARACENTESIS, ABDOMINAL;  Surgeon: Dosc Diagnostic Provider;  Location: [facility] OR;  Service: Radiology;  Laterality: N/A;  REQUESTED 10AM START    PHACOEMULSIFICATION OF CATARACT Right 9/26/2018    Procedure: PHACOEMULSIFICATION, CATARACT;  Surgeon: Perla Cortés MD;  Location: [facility] OR [address] [place] [address];  Service: Ophthalmology;  Laterality: Right;    REPEAT ABDOMINAL PARACENTESIS N/A 2/11/2019    Procedure: PARACENTESIS, ABDOMINAL, REPEAT;  Surgeon:  Dosc Diagnostic Provider;  Location: [facility] OR;  Service: Radiology;  Laterality: N/A;  1PM START    REPEAT ABDOMINAL PARACENTESIS N/A 9/12/2019    Procedure: PARACENTESIS, ABDOMINAL, REPEAT;  Surgeon: Dosc Diagnostic Provider;  Location: [facility] OR;  Service: Radiology;  Laterality: N/A;  9AM START    REPEAT ABDOMINAL PARACENTESIS N/A 12/2/2019    Procedure: PARACENTESIS, ABDOMINAL, REPEAT;  Surgeon: Dosc Diagnostic Provider;  Location: [facility] OR;  Service: Radiology;  Laterality: N/A;  3PM START    REPEAT ABDOMINAL PARACENTESIS N/A 1/10/2020    Procedure: PARACENTESIS, ABDOMINAL, REPEAT;  Surgeon: Dosc Diagnostic Provider;  Location: [facility] OR;  Service: Radiology;  Laterality: N/A;  1130AM START    REPEAT ABDOMINAL PARACENTESIS N/A 1/20/2020    Procedure: PARACENTESIS, ABDOMINAL, REPEAT;  Surgeon: Dosc Diagnostic Provider;  Location: [facility] OR;  Service: Radiology;  Laterality: N/A;  1PM START    REPEAT ABDOMINAL PARACENTESIS N/A 1/31/2020    Procedure: PARACENTESIS, ABDOMINAL, REPEAT;  Surgeon: Dos Diagnostic Provider;  Location: [facility] OR;  Service: Radiology;  Laterality: N/A;  10AM START    REPEAT ABDOMINAL PARACENTESIS N/A 3/16/2020    Procedure: PARACENTESIS, ABDOMINAL, REPEAT;  Surgeon: Dosc Diagnostic Provider;  Location: [facility] OR;  Service: Radiology;  Laterality: N/A;  10AM START    REPEAT ABDOMINAL PARACENTESIS N/A 3/30/2020    Procedure: PARACENTESIS, ABDOMINAL, REPEAT;  Surgeon: Dosc Diagnostic Provider;  Location: [facility] OR;  Service: Radiology;  Laterality: N/A;    REPEAT ABDOMINAL PARACENTESIS N/A 4/9/2020    Procedure: PARACENTESIS, ABDOMINAL, REPEAT;  Surgeon: Dosc Diagnostic Provider;  Location: [facility] OR;  Service: Radiology;  Laterality: N/A;  1130AM START    REPEAT ABDOMINAL PARACENTESIS N/A 5/8/2020    Procedure: PARACENTESIS, ABDOMINAL, REPEAT;  Surgeon: Dosc Diagnostic Provider;  Location: [facility] OR;  Service: Radiology;  Laterality: N/A;  9AM START    REPEAT ABDOMINAL PARACENTESIS N/A 5/21/2020     Procedure: PARACENTESIS, ABDOMINAL, REPEAT;  Surgeon: Dosc Diagnostic Provider;  Location: [facility] OR;  Service: Radiology;  Laterality: N/A;  10AM START    REPEAT ABDOMINAL PARACENTESIS N/A 6/5/2020    Procedure: PARACENTESIS, ABDOMINAL, REPEAT;  Surgeon: Dosc Diagnostic Provider;  Location: [facility] OR;  Service: Radiology;  Laterality: N/A;  NOON START    REPEAT ABDOMINAL PARACENTESIS N/A 7/10/2020    Procedure: PARACENTESIS, ABDOMINAL, REPEAT;  Surgeon: Dosc Diagnostic Provider;  Location: [facility] OR;  Service: Radiology;  Laterality: N/A;  1PM START    REPEAT ABDOMINAL PARACENTESIS N/A 8/20/2020    Procedure: PARACENTESIS, ABDOMINAL, REPEAT;  Surgeon: Dosc Diagnostic Provider;  Location: [facility] OR;  Service: Radiology;  Laterality: N/A;  1PM START    REPEAT ABDOMINAL PARACENTESIS N/A 9/4/2020    Procedure: PARACENTESIS, ABDOMINAL, REPEAT;  Surgeon: Dosc Diagnostic Provider;  Location: [facility] OR;  Service: Radiology;  Laterality: N/A;  11 AM START    REPEAT ABDOMINAL PARACENTESIS N/A 9/16/2020    Procedure: PARACENTESIS, ABDOMINAL, REPEAT;  Surgeon: Dosc Diagnostic Provider;  Location: [facility] OR;  Service: Radiology;  Laterality: N/A;  START 2:00 P.M.    REPEAT ABDOMINAL PARACENTESIS N/A 9/28/2020    Procedure: PARACENTESIS, ABDOMINAL, REPEAT;  Surgeon: Dosc Diagnostic Provider;  Location: [facility] OR;  Service: Radiology;  Laterality: N/A;  1 P.M. START    REPEAT ABDOMINAL PARACENTESIS N/A 11/3/2020    Procedure: PARACENTESIS, ABDOMINAL, REPEAT;  Surgeon: Dosc Diagnostic Provider;  Location: [facility] OR;  Service: Radiology;  Laterality: N/A;  11AM START    REPEAT ABDOMINAL PARACENTESIS N/A 11/13/2020    Procedure: PARACENTESIS, ABDOMINAL, REPEAT;  Surgeon: Dosc Diagnostic Provider;  Location: [facility] OR;  Service: Radiology;  Laterality: N/A;  12PM START    REPEAT ABDOMINAL PARACENTESIS N/A 11/23/2020    Procedure: PARACENTESIS, ABDOMINAL, REPEAT;  Surgeon: Dosc Diagnostic Provider;  Location: [facility] OR;  Service: Radiology;   Laterality: N/A;    REPEAT ABDOMINAL PARACENTESIS N/A 12/1/2020    Procedure: PARACENTESIS, ABDOMINAL, REPEAT;  Surgeon: Dosc Diagnostic Provider;  Location: [facility] OR;  Service: Radiology;  Laterality: N/A;  11AM START    REPEAT ABDOMINAL PARACENTESIS N/A 12/8/2020    Procedure: PARACENTESIS, ABDOMINAL, REPEAT;  Surgeon: Dosc Diagnostic Provider;  Location: [facility] OR;  Service: Radiology;  Laterality: N/A;  93AM START    REPEAT ABDOMINAL PARACENTESIS N/A 12/17/2020    Procedure: PARACENTESIS, ABDOMINAL, REPEAT;  Surgeon: Dosc Diagnostic Provider;  Location: [facility] OR;  Service: Radiology;  Laterality: N/A;  10AM START    REPEAT ABDOMINAL PARACENTESIS N/A 12/30/2020    Procedure: PARACENTESIS, ABDOMINAL, REPEAT;  Surgeon: Dos Diagnostic Provider;  Location: [facility] OR;  Service: Radiology;  Laterality: N/A;  9 A.M. START    REPEAT ABDOMINAL PARACENTESIS N/A 1/12/2021    Procedure: PARACENTESIS, ABDOMINAL, REPEAT;  Surgeon: Dos Diagnostic Provider;  Location: [facility] OR;  Service: Radiology;  Laterality: N/A;  10 AM START    REPEAT ABDOMINAL PARACENTESIS N/A 2/10/2021    Procedure: PARACENTESIS, ABDOMINAL, REPEAT;  Surgeon: Dos Diagnostic Provider;  Location: [facility] OR;  Service: Radiology;  Laterality: N/A;  2 P.M. START    REPEAT ABDOMINAL PARACENTESIS N/A 2/18/2021    Procedure: PARACENTESIS, ABDOMINAL, REPEAT;  Surgeon: Dosc Diagnostic Provider;  Location: [facility] OR;  Service: Radiology;  Laterality: N/A;  230PM START    REPEAT ABDOMINAL PARACENTESIS N/A 2/26/2021    Procedure: PARACENTESIS, ABDOMINAL, REPEAT;  Surgeon: Dosc Diagnostic Provider;  Location: [facility] OR;  Service: Radiology;  Laterality: N/A;    REPEAT ABDOMINAL PARACENTESIS N/A 3/4/2021    Procedure: PARACENTESIS, ABDOMINAL, REPEAT;  Surgeon: Dosc Diagnostic Provider;  Location: [facility] OR;  Service: Radiology;  Laterality: N/A;  9AM START    REPEAT ABDOMINAL PARACENTESIS N/A 3/12/2021    Procedure: PARACENTESIS, ABDOMINAL, REPEAT;  Surgeon: Dosc Diagnostic  Provider;  Location: [facility] OR;  Service: Radiology;  Laterality: N/A;  10:00 START TIME  NO PRE-OP REQUIRED    REPEAT ABDOMINAL PARACENTESIS N/A 4/7/2021    Procedure: PARACENTESIS, ABDOMINAL, REPEAT;  Surgeon: Dosc Diagnostic Provider;  Location: [facility] OR;  Service: Radiology;  Laterality: N/A;  1 P.M. START    REPEAT ABDOMINAL PARACENTESIS N/A 4/16/2021    Procedure: PARACENTESIS, ABDOMINAL, REPEAT;  Surgeon: Dosc Diagnostic Provider;  Location: [facility] OR;  Service: Radiology;  Laterality: N/A;  9:OO START  NO PRE-OP REQ'D    REPEAT ABDOMINAL PARACENTESIS N/A 4/26/2021    Procedure: PARACENTESIS, ABDOMINAL, REPEAT;  Surgeon: Dosc Diagnostic Provider;  Location: [facility] OR;  Service: Radiology;  Laterality: N/A;  9AM START    REPEAT ABDOMINAL PARACENTESIS N/A 5/6/2021    Procedure: PARACENTESIS, ABDOMINAL, REPEAT;  Surgeon: Dosc Diagnostic Provider;  Location: [facility] OR;  Service: Radiology;  Laterality: N/A;  3PM START    REPEAT ABDOMINAL PARACENTESIS N/A 5/14/2021    Procedure: PARACENTESIS, ABDOMINAL, REPEAT;  Surgeon: Dos Diagnostic Provider;  Location: [facility] OR;  Service: Radiology;  Laterality: N/A;  1:00PM START  NO PRE-OP REQ'D    REPEAT ABDOMINAL PARACENTESIS N/A 5/24/2021    Procedure: PARACENTESIS, ABDOMINAL, REPEAT;  Surgeon: Dosc Diagnostic Provider;  Location: [facility] OR;  Service: Radiology;  Laterality: N/A;  2 P.M START TIME    REPEAT ABDOMINAL PARACENTESIS N/A 6/4/2021    Procedure: PARACENTESIS, ABDOMINAL, REPEAT;  Surgeon: Dosc Diagnostic Provider;  Location: [facility] OR;  Service: Radiology;  Laterality: N/A;  11AM START    REPEAT ABDOMINAL PARACENTESIS N/A 6/14/2021    Procedure: PARACENTESIS, ABDOMINAL, REPEAT;  Surgeon: Dosc Diagnostic Provider;  Location: [facility] OR;  Service: Radiology;  Laterality: N/A;  8AM START    REPEAT ABDOMINAL PARACENTESIS N/A 6/23/2021    Procedure: PARACENTESIS, ABDOMINAL, REPEAT;  Surgeon: Dosc Diagnostic Provider;  Location: [facility] OR;  Service: Radiology;  Laterality:  N/A;    REVISION OF PROCEDURE INVOLVING GLAUCOMA DRAINAGE DEVICE Left 10/2/2019    EXTRUDING BAERVELDT /WITH PERICARDIAL PATCH GRAFT ()    Right foot surgery  10/2014    TOE AMPUTATION Right     first and second    TOE AMPUTATION Left 11/16/2018    Procedure: AMPUTATION, TOES  2-5;  Surgeon: Mitch Chan MD;  Location: [facility] OR;  Service: Vascular;  Laterality: Left;    TOE AMPUTATION Left 12/5/2018    Procedure: AMPUTATION, TOE;  Surgeon: Mitch Chan MD;  Location: [facility] OR;  Service: Vascular;  Laterality: Left;  left great toe    TONSILLECTOMY         Home Meds:   Prior to Admission medications    Medication Sig Start Date End Date Taking? Authorizing Provider   acetaminophen (TYLENOL) 325 MG tablet Take 650 mg by mouth every 6 (six) hours as needed for Pain.    Historical Provider   allopurinoL (ZYLOPRIM) 300 MG tablet Take 1 tablet (300 mg total) by mouth once daily. 9/28/21   Rubén Davis MD   aspirin (ECOTRIN) 81 MG EC tablet Take 81 mg by mouth once daily.    Historical Provider   blood sugar diagnostic Strp To check BG 2 times daily, to use with insurance preferred meter. e11.65 1/18/22   Sheryl Madison NP   blood-glucose meter kit To check BG 2 times daily, to use with insurance preferred meter 1/18/22 1/18/23  Sheryl Madison NP   brimonidine 0.2% (ALPHAGAN) 0.2 % Drop INSTILL 1 DROP IN BOTH EYES THREE TIMES DAILY 1/9/22   Perla Cortés MD   carvediloL (COREG) 3.125 MG tablet Take 1 tablet (3.125 mg total) by mouth 2 (two) times daily with meals. 2/14/22   Rubén Davis MD   clopidogreL (PLAVIX) 75 mg tablet Take 1 tablet (75 mg total) by mouth once daily. 1/14/21 1/14/22  Mitch Chan MD   coenzyme Q10 100 mg capsule Take 100 mg by mouth every morning.    Historical Provider   collagenase (SANTYL) ointment Apply topically to diabetic ulcer on lower right leg once daily. 4/13/21   Rubén Davis MD   dorzolamide-timolol 2-0.5% (COSOPT) 22.3-6.8 mg/mL  "ophthalmic solution Place 1 drop into both eyes 3 (three) times daily. 12/16/21   Perla Cortés MD   ezetimibe (ZETIA) 10 mg tablet TAKE 1 TABLET(10 MG) BY MOUTH EVERY DAY 12/30/21   Rubén Davis MD   fish oil-omega-3 fatty acids 300-1,000 mg capsule Take 1 capsule by mouth 2 (two) times daily. 1/23/19   Sara Ching MD   gabapentin (NEURONTIN) 100 MG capsule TAKE 2 CAPSULES(200 MG) BY MOUTH EVERY EVENING 10/8/21   Rubén Davis MD   HYDROcodone-acetaminophen (NORCO) 5-325 mg per tablet Take 1 tablet by mouth every 12 (twelve) hours as needed for Pain. 9/21/21   Rubén Davis MD   insulin degludec-liraglutide (XULTOPHY 100/3.6) 100 unit-3.6 mg /mL (3 mL) Pen Inject 35 Units into the skin once daily. 1/18/22   Sheryl Madison NP   lancets Misc To check BG 2 times daily, to use with insurance preferred meter. Dx code e11.65 1/18/22   Sheryl Madison NP   loratadine (CLARITIN) 10 mg tablet Take 10 mg by mouth daily as needed for Allergies.    Historical Provider   metOLazone (ZAROXOLYN) 2.5 MG tablet TAKE 1 TABLET(2.5 MG) BY MOUTH EVERY DAY 11/30/21   Rubén Davis MD   MULTIVIT,THER IRON,CA,FA & MIN (MULTIVITAMIN) Tab Take 1 tablet by mouth every morning.     Historical Provider   pen needle, diabetic (BD ULTRA-FINE AMADOR PEN NEEDLE) 32 gauge x 5/32" Ndle 1 Device by Misc.(Non-Drug; Combo Route) route 2 (two) times a day. 9/11/20   Sheryl Madison NP   torsemide (DEMADEX) 20 MG Tab TAKE 4 TABLETS(80 MG) BY MOUTH TWICE DAILY 1/2/22   Rubén Davis MD     Anticoagulants/Antiplatelets: aspirin    Allergies:   Review of patient's allergies indicates:   Allergen Reactions    Statins-hmg-coa reductase inhibitors      Generalized Pain    Onglyza [saxagliptin]     Penicillins Rash     Sedation History:  no adverse reactions    Review of Systems:   Hematological: no known coagulopathies  Respiratory: no shortness of breath  Cardiovascular: no chest pain  Gastrointestinal: no abdominal " pain  Genito-Urinary: no dysuria  Musculoskeletal: negative  Neurological: no TIA or stroke symptoms         OBJECTIVE:     Vital Signs (Most Recent)  BP: 113/73 (02/17/22 0958)    Physical Exam:  ASA: 2  Mallampati: N/A    General: no acute distress  Mental Status: alert and oriented to person, place and time  HEENT: normocephalic, atraumatic  Chest: unlabored breathing  Heart: regular heart rate  Abdomen: distended  Extremity: moves all extremities    Laboratory  Lab Results   Component Value Date    INR 1.0 08/05/2021       Lab Results   Component Value Date    WBC 4.58 12/14/2021    HGB 14.1 12/14/2021    HCT 44.3 12/14/2021    MCV 96 12/14/2021     12/14/2021      Lab Results   Component Value Date     (H) 12/14/2021     12/14/2021    K 3.5 12/14/2021    CL 98 12/14/2021    CO2 32 (H) 12/14/2021    BUN 59 (H) 12/14/2021    CREATININE 1.3 12/14/2021    CALCIUM 8.6 (L) 12/14/2021    MG 2.1 05/31/2019    ALT 18 12/14/2021    AST 20 12/14/2021    ALBUMIN 2.7 (L) 12/14/2021    BILITOT 0.4 12/14/2021       ASSESSMENT/PLAN:     Sedation Plan: local anesthesia only  Patient will undergo paracentesis.    Vin Kong MD  Staff Radiologist  Department of Radiology  Pager: [phone]

## 2022-02-17 NOTE — PROCEDURES
Radiology Post-Procedure Note    Pre Op Diagnosis: Ascites, CKD 3, CHF  Post Op Diagnosis: Same    Procedure: Paracentesis    Procedure performed by: Vin Kong MD    Written Informed Consent Obtained: Yes  Specimen Removed: YES thin yellow fluid  Estimated Blood Loss: Minimal    Findings:   Successful paracentesis.  Albumin administered PRN per protocol.    Patient tolerated procedure well.    Vin Kong MD  Staff Radiologist  Department of Radiology  Pager: [phone]

## 2022-02-24 NOTE — BRIEF OP NOTE
Radiology Post-Procedure Note     Pre Op Diagnosis: Recurrent painful, tense ascites  Post Op Diagnosis: Same     Procedure: 1. US-guided percutaneous RLQ-approach therapeutic LVP     Procedure performed by: Zach Cha MD     Written Informed Consent Obtained: Yes  Specimen Removed: YES, 4,800-L of slightly cloudy, serous fluid  Estimated Blood Loss: none     Findings:   Successful US-guided percutaneous RLQ-approach therapeutic LVP with local anesthetic only and albumin infusion post PRN as indicated per institutional protocol. Patient tolerated the procedure well. No immediate post-procedural complications noted.      Thank you for considering IR for the care of your patient.      Zach Cha MD  Interventional Radiology

## 2022-02-24 NOTE — H&P
Radiology History & Physical      SUBJECTIVE:     Chief Complaint: Recurrent painful, tense ascites      History of Present Illness:  Marvin Ray is a 61 y.o. male with PMHx of CKD III and combined systolic/diastolic CHF complicated by recurrent abdominal distension and pain 2/2 recurrent painful, tense ascites requiring frequent  therapeutic large-volume paracentesis.      A new outpatient IR consult placed for US-guided percutaneous RLQ-approach therapeutic LVP.    Past Medical History:   Diagnosis Date    Arthritis     Cellulitis     CKD (chronic kidney disease), stage III     Coronary artery disease     Diabetes mellitus     Diabetic retinopathy     Diabetic ulcer of left foot     Glaucoma     Gout     Hyperlipemia     Hypertension     ICD (implantable cardioverter-defibrillator) in place 11/02/2018    Left chest    Non-pressure chronic ulcer of other part of left foot with fat layer exposed 10/23/2018    PVD (peripheral vascular disease)     Type 2 diabetes mellitus with left diabetic foot ulcer 10/29/2018    Unsteady gait     uses a wheelchair     Past Surgical History:   Procedure Laterality Date    AHMED GLAUCOMA IMPLANT Left 2011    DONE AT [facility]    AORTOGRAPHY WITH SERIALOGRAPHY Left 4/30/2019    Procedure: AORTOGRAM, WITH SERIALOGRAPHY, LEFT LOWER EXTREMITY, POSSIBLE INTERVENTION;  Surgeon: Mitch Chan MD;  Location: [facility] OR;  Service: Vascular;  Laterality: Left;  RN PRE OP 4-23-19.  NO H & P, No Cosent  [place]    AORTOGRAPHY WITH SERIALOGRAPHY Right 10/6/2020    Procedure: AORTOGRAM, WITH SERIALOGRAPHY, RIGHT LOWER EXTREMITY ANGIOGRAM, POSSIBLE INTERVENTION;  Surgeon: Mitch Chan MD;  Location: [facility] OR;  Service: Vascular;  Laterality: Right;  RN PREOP 10/1/2020--COVID NEGATIVE ON 10/5    AORTOGRAPHY WITH SERIALOGRAPHY Right 1/13/2021    Procedure: AORTOGRAM, WITH RIGHT LOWER EXTREMITY ANGIOGRAM, POSSIBLE INTERVENTION;  Surgeon: Mitch Chan MD;   Location: [facility] OR;  Service: Vascular;  Laterality: Right;  1PM START----NEED CONSENT  RN PREOP 1/8/2021--COVID NEGATIVE ON 1/12    AORTOGRAPHY WITH SERIALOGRAPHY Right 1/26/2021    Procedure: AORTOGRAM, RIGHT LOWER EXTREMITY ANGIOGRAM, POSSIBLE INTERVENTION;  Surgeon: Mitch Chan MD;  Location: [facility] OR;  Service: Vascular;  Laterality: Right;  RN PREOP 1/25/2021, COVID NEGATIVE ON 1/25, cxr, and ecg done    AORTOGRAPHY WITH SERIALOGRAPHY Right 3/30/2021    Procedure: AORTOGRAM, WITH RIGHT LOWER EXTREMITY ANGIOGRAM, POSSIBLE INTERVENTION;  Surgeon: Mitch Chan MD;  Location: [facility] OR;  Service: Vascular;  Laterality: Right;  RN PREOP 3/26/2021  COVID NEGATIVE    BAERVELDT GLAUCOMA IMPLANT Left 2012    WITH CATARACT EXTRACTION//DONE AT [facility]    BELOW KNEE AMPUTATION OF LOWER EXTREMITY Right 5/17/2021    Procedure: AMPUTATION, BELOW KNEE;  Surgeon: Christiana Pritchett MD;  Location: [facility] OR;  Service: Orthopedics;  Laterality: Right;  WOUND VAC -PREVENA  SUPINE---HAS--ICD- Iast interrogation -3/21  AMPUTATION TRAY  1ST CASE OKWHITNEY WHITLOCK  RN PREOP 5/13/2021     COVID --NEGATIVE ON 5/14---INCOMPLETE H/P    BIOPSY Right 12/18/2020    Procedure: Biopsy- trocar biopsy;  Surgeon: Brianna Champion DPM;  Location: [facility] OR;  Service: Podiatry;  Laterality: Right;    CARDIAC CATHETERIZATION Left 05/2016    CARDIAC DEFIBRILLATOR PLACEMENT Left 11/02/2018    CATARACT EXTRACTION W/  INTRAOCULAR LENS IMPLANT Left 2012    WITH BAERVEDT (DONE AT [facility])    CATARACT EXTRACTION W/  INTRAOCULAR LENS IMPLANT Right 09/26/2018    COMPLEX ()    CB DESTRUCTION WITH CYCLO G6 Left 02/15/2017        CYST REMOVAL      DEBRIDEMENT OF FOOT  12/28/2018    Procedure: DEBRIDEMENT, FOOT;  Surgeon: Mitch Chan MD;  Location: [facility] OR;  Service: Vascular;;    DEBRIDEMENT OF FOOT  6/5/2019    Procedure: DEBRIDEMENT, FOOT;  Surgeon: Mitch Chan MD;  Location: [facility] OR;  Service:  Vascular;;    DEBRIDEMENT OF FOOT Right 3/5/2021    Procedure: DEBRIDEMENT, FOOT with Misonic device;  Surgeon: Mitch Wall MD;  Location: [facility] OR;  Service: Vascular;  Laterality: Right;    EXAMINATION UNDER ANESTHESIA Left 12/5/2018    Procedure: Exam under anesthesia, left foot debridement, washout and all other indicated procedures;  Surgeon: Mitch Wall MD;  Location: [facility] OR;  Service: Vascular;  Laterality: Left;  1030AM START  RN PREOP 12/3/2018-----AICD  SEEN BY DR JAMES 12/4    EXAMINATION UNDER ANESTHESIA Left 2/13/2019    Procedure: LEFT FOOT WOUND DEBRIDEMENT, WASHOUT AND ALL OTHER INDICATED PROCEDURES;  Surgeon: Mitch Wall MD;  Location: [facility] OR;  Service: Vascular;  Laterality: Left;  requesting 1st case start  THIS CASE 1ST PER DR WALL ON 2/1/19 @ 844AM -LO  RN PREOP 2/6/2019  HAS CARDIAC CLEARANCE    EXAMINATION UNDER ANESTHESIA Left 12/28/2018    Procedure: Exam under anesthesia, left foot debridement, left foot washout, possible left second through fifth metatarsal resection;  Surgeon: Mitch Wall MD;  Location: [facility] OR;  Service: Vascular;  Laterality: Left;  1:00pm start per julissa @ 8:58am  RN  PHONE PRE OP 12-27-18--=Pt coming [facility]  NEED CONSENT    EXAMINATION UNDER ANESTHESIA Left 6/5/2019    Procedure: LEFT FOOT DEBRIDEMENT, WASHOUT AND ALL OTHER INDICATED PROCEDURES;  Surgeon: Mitch Wall MD;  Location: [facility] OR;  Service: Vascular;  Laterality: Left;  RN PRE OP 5-31-19------ICD------NEED CONSENT    EXAMINATION UNDER ANESTHESIA Right 11/9/2020    Procedure: RIGHT FOOT DEBRIDEMENT, WASHOUT AND ALL OTHER INDICATED PROCEDURES;  Surgeon: Mitch Wall MD;  Location: [facility] OR;  Service: Vascular;  Laterality: Right;  RN PREOP 10/27/2020, --COVID NEGATIVE ON 11/6, has cardiac clearance/[place] 10/27/2020, pt has ICD  NEEDS ORDERS    EXAMINATION UNDER ANESTHESIA Right 2/4/2021    Procedure: RIGHT FOOT DEBRIDEMENT,  POSSIBLE CALCANECTOMY, POSSIBLE FIFTH TOE AMPUTATION, WASHOUT AND ALL OTHER INDICATED PROCEDURES;  Surgeon: Mitch Chan MD;  Location: [facility] OR;  Service: Vascular;  Laterality: Right;  RN PREOP 2/2/2021--COVID NEGATIVE ON 2/2  ICD    EYE SURGERY      CAT EXT OU    FOOT AMPUTATION THROUGH METATARSAL Right 3/5/2021    Procedure: Right foot wound debridement, possible transmetatarsal amputation, possible fifth toe amputation, washout;  Surgeon: Mitch Chan MD;  Location: [facility] OR;  Service: Vascular;  Laterality: Right;  MISONIX SONIC ONE JAYDEN LOUISE [phone] SPOKE TO HIM ON MY OFFICE @ 7:31AM ON 2-  RN PRE Op, Covid NEGATIVE ON  3-2-21.  C A  8:30AM START----NEED H/P    INCISION AND DRAINAGE Left 11/14/2018    Procedure: Incision and Drainage, left lower extremity debridement, washout;  Surgeon: Mitch Chan MD;  Location: [facility] OR;  Service: Vascular;  Laterality: Left;    INCISION AND DRAINAGE Left 11/16/2018    Procedure: INCISION AND DRAINAGE;  Surgeon: Mitch Chan MD;  Location: [facility] OR;  Service: Vascular;  Laterality: Left;    INCISION AND DRAINAGE Right 11/18/2020    Procedure: Debridement and washout right lower extremity wounds;  Surgeon: Mitch Chan MD;  Location: [facility] OR 2ND FLR;  Service: Vascular;  Laterality: Right;    INCISION AND DRAINAGE Right 1/27/2021    Procedure: RIGHT FOOT DEBRIDEMENT, WASHOUT AND ALL OTHER INDICATED PROCEDURES;  Surgeon: Mitch Chan MD;  Location: [facility] OR 2ND FLR;  Service: Vascular;  Laterality: Right;    INTRAOCULAR PROSTHESES INSERTION Right 9/26/2018    Procedure: INSERTION, IOL PROSTHESIS;  Surgeon: Perla Cortés MD;  Location: [facility] OR 1ST FLR;  Service: Ophthalmology;  Laterality: Right;    PERITONEOCENTESIS N/A 3/1/2019    Procedure: PARACENTESIS, ABDOMINAL, INITIAL;  Surgeon: [facility] Diagnostic Provider;  Location: [facility] OR;  Service: Radiology;  Laterality: N/A;    PERITONEOCENTESIS N/A  3/25/2019    Procedure: PARACENTESIS, ABDOMINAL, INITIAL;  Surgeon: Dosc Diagnostic Provider;  [facility] OR;  Service: Radiology;  Laterality: N/A;    PERITONEOCENTESIS N/A 7/22/2019    Procedure: PARACENTESIS, ABDOMINAL, INITIAL;  Surgeon: Dosc Diagnostic Provider;  Location: [facility] OR;  Service: Radiology;  Laterality: N/A;    PERITONEOCENTESIS N/A 8/16/2019    Procedure: PARACENTESIS, ABDOMINAL, INITIAL;  Surgeon: Dosc Diagnostic Provider;  [facility];  Service: Radiology;  Laterality: N/A;    PERITONEOCENTESIS N/A 9/26/2019    Procedure: PARACENTESIS, ABDOMINAL;  Surgeon: Dosc Diagnostic Provider;  [facility];  Service: Radiology;  Laterality: N/A;    PERITONEOCENTESIS N/A 10/11/2019    Procedure: PARACENTESIS, ABDOMINAL;  Surgeon: Dosc Diagnostic Provider;  [facility];  Service: Radiology;  Laterality: N/A;    PERITONEOCENTESIS N/A 10/31/2019    Procedure: PARACENTESIS, ABDOMINAL;  Surgeon: Dosc Diagnostic Provider;  [facility];  Service: Radiology;  Laterality: N/A;    PERITONEOCENTESIS N/A 11/18/2019    Procedure: PARACENTESIS, ABDOMINAL;  Surgeon: Dosc Diagnostic Provider;  [facility];  Service: Radiology;  Laterality: N/A;    PERITONEOCENTESIS N/A 12/16/2019    Procedure: PARACENTESIS, ABDOMINAL;  Surgeon: Dosc Diagnostic Provider;  [facility];  Service: Radiology;  Laterality: N/A;  2PM START    PERITONEOCENTESIS N/A 2/7/2020    Procedure: PARACENTESIS, ABDOMINAL;  Surgeon: Dosc Diagnostic Provider;  [facility];  Service: Radiology;  Laterality: N/A;  REQUESTED 10:30A START    PERITONEOCENTESIS N/A 2/19/2020    Procedure: PARACENTESIS, ABDOMINAL;  Surgeon: Dosc Diagnostic Provider;  [facility];  Service: Radiology;  Laterality: N/A;    PERITONEOCENTESIS N/A 2/28/2020    Procedure: PARACENTESIS, ABDOMINAL;  Surgeon: Dosc Diagnostic Provider;  Location: [facility] OR;  Service: Radiology;  Laterality: N/A;  REQUESTED 10AM START     PHACOEMULSIFICATION OF CATARACT Right 9/26/2018    Procedure: PHACOEMULSIFICATION, CATARACT;  Surgeon: Perla Cortés MD;  Location: [facility] OR [address] [place] [address];  Service: Ophthalmology;  Laterality: Right;    REPEAT ABDOMINAL PARACENTESIS N/A 2/11/2019    Procedure: PARACENTESIS, ABDOMINAL, REPEAT;  Surgeon: Dosc Diagnostic Provider;  Location: [facility] OR;  Service: Radiology;  Laterality: N/A;  1PM START    REPEAT ABDOMINAL PARACENTESIS N/A 9/12/2019    Procedure: PARACENTESIS, ABDOMINAL, REPEAT;  Surgeon: Dosc Diagnostic Provider;  Location: [facility] OR;  Service: Radiology;  Laterality: N/A;  9AM START    REPEAT ABDOMINAL PARACENTESIS N/A 12/2/2019    Procedure: PARACENTESIS, ABDOMINAL, REPEAT;  Surgeon: Dosc Diagnostic Provider;  Location: [facility] OR;  Service: Radiology;  Laterality: N/A;  3PM START    REPEAT ABDOMINAL PARACENTESIS N/A 1/10/2020    Procedure: PARACENTESIS, ABDOMINAL, REPEAT;  Surgeon: Dosc Diagnostic Provider;  Location: [facility] OR;  Service: Radiology;  Laterality: N/A;  1130AM START    REPEAT ABDOMINAL PARACENTESIS N/A 1/20/2020    Procedure: PARACENTESIS, ABDOMINAL, REPEAT;  Surgeon: Dosc Diagnostic Provider;  Location: [facility] OR;  Service: Radiology;  Laterality: N/A;  1PM START    REPEAT ABDOMINAL PARACENTESIS N/A 1/31/2020    Procedure: PARACENTESIS, ABDOMINAL, REPEAT;  Surgeon: Dosc Diagnostic Provider;  Location: [facility] OR;  Service: Radiology;  Laterality: N/A;  10AM START    REPEAT ABDOMINAL PARACENTESIS N/A 3/16/2020    Procedure: PARACENTESIS, ABDOMINAL, REPEAT;  Surgeon: Dosc Diagnostic Provider;  Location: [facility] OR;  Service: Radiology;  Laterality: N/A;  10AM START    REPEAT ABDOMINAL PARACENTESIS N/A 3/30/2020    Procedure: PARACENTESIS, ABDOMINAL, REPEAT;  Surgeon: Dosc Diagnostic Provider;  Location: [facility] OR;  Service: Radiology;  Laterality: N/A;    REPEAT ABDOMINAL PARACENTESIS N/A 4/9/2020    Procedure: PARACENTESIS, ABDOMINAL, REPEAT;  Surgeon: Dosc Diagnostic Provider;  Location: [facility]  OR;  Service: Radiology;  Laterality: N/A;  1130AM START    REPEAT ABDOMINAL PARACENTESIS N/A 5/8/2020    Procedure: PARACENTESIS, ABDOMINAL, REPEAT;  Surgeon: Dosc Diagnostic Provider;  Location: [facility] OR;  Service: Radiology;  Laterality: N/A;  9AM START    REPEAT ABDOMINAL PARACENTESIS N/A 5/21/2020    Procedure: PARACENTESIS, ABDOMINAL, REPEAT;  Surgeon: Dosc Diagnostic Provider;  Location: [facility] OR;  Service: Radiology;  Laterality: N/A;  10AM START    REPEAT ABDOMINAL PARACENTESIS N/A 6/5/2020    Procedure: PARACENTESIS, ABDOMINAL, REPEAT;  Surgeon: Dosc Diagnostic Provider;  Location: [facility] OR;  Service: Radiology;  Laterality: N/A;  NOON START    REPEAT ABDOMINAL PARACENTESIS N/A 7/10/2020    Procedure: PARACENTESIS, ABDOMINAL, REPEAT;  Surgeon: Dosc Diagnostic Provider;  Location: [facility] OR;  Service: Radiology;  Laterality: N/A;  1PM START    REPEAT ABDOMINAL PARACENTESIS N/A 8/20/2020    Procedure: PARACENTESIS, ABDOMINAL, REPEAT;  Surgeon: Dosc Diagnostic Provider;  Location: [facility] OR;  Service: Radiology;  Laterality: N/A;  1PM START    REPEAT ABDOMINAL PARACENTESIS N/A 9/4/2020    Procedure: PARACENTESIS, ABDOMINAL, REPEAT;  Surgeon: Dosc Diagnostic Provider;  Location: [facility] OR;  Service: Radiology;  Laterality: N/A;  11 AM START    REPEAT ABDOMINAL PARACENTESIS N/A 9/16/2020    Procedure: PARACENTESIS, ABDOMINAL, REPEAT;  Surgeon: Dosc Diagnostic Provider;  Location: [facility] OR;  Service: Radiology;  Laterality: N/A;  START 2:00 P.M.    REPEAT ABDOMINAL PARACENTESIS N/A 9/28/2020    Procedure: PARACENTESIS, ABDOMINAL, REPEAT;  Surgeon: Dosc Diagnostic Provider;  Location: [facility] OR;  Service: Radiology;  Laterality: N/A;  1 P.M. START    REPEAT ABDOMINAL PARACENTESIS N/A 11/3/2020    Procedure: PARACENTESIS, ABDOMINAL, REPEAT;  Surgeon: Dosc Diagnostic Provider;  Location: [facility] OR;  Service: Radiology;  Laterality: N/A;  11AM START    REPEAT ABDOMINAL PARACENTESIS N/A 11/13/2020    Procedure:  PARACENTESIS, ABDOMINAL, REPEAT;  Surgeon: Dosc Diagnostic Provider;  Location: [facility] OR;  Service: Radiology;  Laterality: N/A;  12PM START    REPEAT ABDOMINAL PARACENTESIS N/A 11/23/2020    Procedure: PARACENTESIS, ABDOMINAL, REPEAT;  Surgeon: Dosc Diagnostic Provider;  [facility]: [facility] OR;  Service: Radiology;  Laterality: N/A;    REPEAT ABDOMINAL PARACENTESIS N/A 12/1/2020    Procedure: PARACENTESIS, ABDOMINAL, REPEAT;  Surgeon: Dosc Diagnostic Provider;  Location: [facility] OR;  Service: Radiology;  Laterality: N/A;  11AM START    REPEAT ABDOMINAL PARACENTESIS N/A 12/8/2020    Procedure: PARACENTESIS, ABDOMINAL, REPEAT;  Surgeon: Dosc Diagnostic Provider;  Location: [facility] OR;  Service: Radiology;  Laterality: N/A;  93AM START    REPEAT ABDOMINAL PARACENTESIS N/A 12/17/2020    Procedure: PARACENTESIS, ABDOMINAL, REPEAT;  Surgeon: Dosc Diagnostic Provider;  Location: [facility] OR;  Service: Radiology;  Laterality: N/A;  10AM START    REPEAT ABDOMINAL PARACENTESIS N/A 12/30/2020    Procedure: PARACENTESIS, ABDOMINAL, REPEAT;  Surgeon: Dosc Diagnostic Provider;  Location: [facility] OR;  Service: Radiology;  Laterality: N/A;  9 A.M. START    REPEAT ABDOMINAL PARACENTESIS N/A 1/12/2021    Procedure: PARACENTESIS, ABDOMINAL, REPEAT;  Surgeon: Dos Diagnostic Provider;  Location: [facility] OR;  Service: Radiology;  Laterality: N/A;  10 AM START    REPEAT ABDOMINAL PARACENTESIS N/A 2/10/2021    Procedure: PARACENTESIS, ABDOMINAL, REPEAT;  Surgeon: Dosc Diagnostic Provider;  Location: [facility] OR;  Service: Radiology;  Laterality: N/A;  2 P.M. START    REPEAT ABDOMINAL PARACENTESIS N/A 2/18/2021    Procedure: PARACENTESIS, ABDOMINAL, REPEAT;  Surgeon: Dosc Diagnostic Provider;  Location: [facility] OR;  Service: Radiology;  Laterality: N/A;  230PM START    REPEAT ABDOMINAL PARACENTESIS N/A 2/26/2021    Procedure: PARACENTESIS, ABDOMINAL, REPEAT;  Surgeon: Dosc Diagnostic Provider;  [facility];  Service: Radiology;  Laterality: N/A;     REPEAT ABDOMINAL PARACENTESIS N/A 3/4/2021    Procedure: PARACENTESIS, ABDOMINAL, REPEAT;  Surgeon: Dosc Diagnostic Provider;  Location: [facility] OR;  Service: Radiology;  Laterality: N/A;  9AM START    REPEAT ABDOMINAL PARACENTESIS N/A 3/12/2021    Procedure: PARACENTESIS, ABDOMINAL, REPEAT;  Surgeon: Dosc Diagnostic Provider;  Location: [facility] OR;  Service: Radiology;  Laterality: N/A;  10:00 START TIME  NO PRE-OP REQUIRED    REPEAT ABDOMINAL PARACENTESIS N/A 4/7/2021    Procedure: PARACENTESIS, ABDOMINAL, REPEAT;  Surgeon: Dosc Diagnostic Provider;  Location: [facility] OR;  Service: Radiology;  Laterality: N/A;  1 P.M. START    REPEAT ABDOMINAL PARACENTESIS N/A 4/16/2021    Procedure: PARACENTESIS, ABDOMINAL, REPEAT;  Surgeon: Dos Diagnostic Provider;  Location: [facility] OR;  Service: Radiology;  Laterality: N/A;  9:OO START  NO PRE-OP REQ'D    REPEAT ABDOMINAL PARACENTESIS N/A 4/26/2021    Procedure: PARACENTESIS, ABDOMINAL, REPEAT;  Surgeon: Dos Diagnostic Provider;  Location: [facility] OR;  Service: Radiology;  Laterality: N/A;  9AM START    REPEAT ABDOMINAL PARACENTESIS N/A 5/6/2021    Procedure: PARACENTESIS, ABDOMINAL, REPEAT;  Surgeon: Dos Diagnostic Provider;  Location: [facility] OR;  Service: Radiology;  Laterality: N/A;  3PM START    REPEAT ABDOMINAL PARACENTESIS N/A 5/14/2021    Procedure: PARACENTESIS, ABDOMINAL, REPEAT;  Surgeon: Dosc Diagnostic Provider;  Location: [facility] OR;  Service: Radiology;  Laterality: N/A;  1:00PM START  NO PRE-OP REQ'D    REPEAT ABDOMINAL PARACENTESIS N/A 5/24/2021    Procedure: PARACENTESIS, ABDOMINAL, REPEAT;  Surgeon: Dosc Diagnostic Provider;  Location: [facility] OR;  Service: Radiology;  Laterality: N/A;  2 P.M START TIME    REPEAT ABDOMINAL PARACENTESIS N/A 6/4/2021    Procedure: PARACENTESIS, ABDOMINAL, REPEAT;  Surgeon: Dosc Diagnostic Provider;  Location: [facility] OR;  Service: Radiology;  Laterality: N/A;  11AM START    REPEAT ABDOMINAL PARACENTESIS N/A 6/14/2021    Procedure:  PARACENTESIS, ABDOMINAL, REPEAT;  Surgeon: [facility] Diagnostic Provider;  Location: [facility] OR;  Service: Radiology;  Laterality: N/A;  8AM START    REPEAT ABDOMINAL PARACENTESIS N/A 6/23/2021    Procedure: PARACENTESIS, ABDOMINAL, REPEAT;  Surgeon: [facility] Diagnostic Provider;  Location: [facility] OR;  Service: Radiology;  Laterality: N/A;    REVISION OF PROCEDURE INVOLVING GLAUCOMA DRAINAGE DEVICE Left 10/2/2019    EXTRUDING BAERVELDT /WITH PERICARDIAL PATCH GRAFT ()    Right foot surgery  10/2014    TOE AMPUTATION Right     first and second    TOE AMPUTATION Left 11/16/2018    Procedure: AMPUTATION, TOES  2-5;  Surgeon: Mitch Chan MD;  Location: [facility] OR;  Service: Vascular;  Laterality: Left;    TOE AMPUTATION Left 12/5/2018    Procedure: AMPUTATION, TOE;  Surgeon: Mitch Chan MD;  Location: [facility] OR;  Service: Vascular;  Laterality: Left;  left great toe    TONSILLECTOMY       Home Meds:   Prior to Admission medications    Medication Sig Start Date End Date Taking? Authorizing Provider   acetaminophen (TYLENOL) 325 MG tablet Take 650 mg by mouth every 6 (six) hours as needed for Pain.    Historical Provider   allopurinoL (ZYLOPRIM) 300 MG tablet Take 1 tablet (300 mg total) by mouth once daily. 9/28/21   Rubén Davis MD   aspirin (ECOTRIN) 81 MG EC tablet Take 81 mg by mouth once daily.    Historical Provider   blood sugar diagnostic Strp To check BG 2 times daily, to use with insurance preferred meter. e11.65 1/18/22   Sheryl Madison NP   blood-glucose meter kit To check BG 2 times daily, to use with insurance preferred meter 1/18/22 1/18/23  Sheryl Madison NP   brimonidine 0.2% (ALPHAGAN) 0.2 % Drop INSTILL 1 DROP IN BOTH EYES THREE TIMES DAILY 1/9/22   Perla Cortés MD   carvediloL (COREG) 3.125 MG tablet Take 1 tablet (3.125 mg total) by mouth 2 (two) times daily with meals. 2/14/22   Rubén Davis MD   clopidogreL (PLAVIX) 75 mg tablet Take 1 tablet (75 mg total) by mouth  "once daily. 1/14/21 1/14/22  Mitch Chan MD   coenzyme Q10 100 mg capsule Take 100 mg by mouth every morning.    Historical Provider   collagenase (SANTYL) ointment Apply topically to diabetic ulcer on lower right leg once daily. 4/13/21   Rubén Davis MD   dorzolamide-timolol 2-0.5% (COSOPT) 22.3-6.8 mg/mL ophthalmic solution Place 1 drop into both eyes 3 (three) times daily. 12/16/21   Perla Cortés MD   ezetimibe (ZETIA) 10 mg tablet TAKE 1 TABLET(10 MG) BY MOUTH EVERY DAY 12/30/21   Rubén Davis MD   fish oil-omega-3 fatty acids 300-1,000 mg capsule Take 1 capsule by mouth 2 (two) times daily. 1/23/19   Sara Ching MD   gabapentin (NEURONTIN) 100 MG capsule TAKE 2 CAPSULES(200 MG) BY MOUTH EVERY EVENING 10/8/21   Rubén Davis MD   HYDROcodone-acetaminophen (NORCO) 5-325 mg per tablet Take 1 tablet by mouth every 12 (twelve) hours as needed for Pain. 9/21/21   Rubén Davis MD   insulin degludec-liraglutide (XULTOPHY 100/3.6) 100 unit-3.6 mg /mL (3 mL) Pen Inject 35 Units into the skin once daily. 1/18/22   Sheryl Madison NP   lancets Misc To check BG 2 times daily, to use with insurance preferred meter. Dx code e11.65 1/18/22   Sheryl Madison NP   loratadine (CLARITIN) 10 mg tablet Take 10 mg by mouth daily as needed for Allergies.    Historical Provider   metOLazone (ZAROXOLYN) 2.5 MG tablet TAKE 1 TABLET(2.5 MG) BY MOUTH EVERY DAY 11/30/21   Rubén Davis MD   MULTIVIT,THER IRON,CA,FA & MIN (MULTIVITAMIN) Tab Take 1 tablet by mouth every morning.     Historical Provider   pen needle, diabetic (BD ULTRA-FINE AMADOR PEN NEEDLE) 32 gauge x 5/32" Ndle 1 Device by Misc.(Non-Drug; Combo Route) route 2 (two) times a day. 9/11/20   Sheryl Madison NP   torsemide (DEMADEX) 20 MG Tab TAKE 4 TABLETS(80 MG) BY MOUTH TWICE DAILY 1/2/22   Rubén Davis MD     Anticoagulants/Antiplatelets: no anticoagulation    Allergies:   Review of patient's allergies indicates:   Allergen Reactions    " Statins-hmg-coa reductase inhibitors      Generalized Pain    Onglyza [saxagliptin]     Penicillins Rash     Sedation History:  no adverse reactions     Review of Systems:   Hematological: no known coagulopathies  Respiratory: positive for - orthopnea and shortness of breath  Cardiovascular: positive for - dyspnea on exertion, orthopnea and shortness of breath  Gastrointestinal: positive for - abdominal pain and distension  Genito-Urinary: no dysuria, trouble voiding, or hematuria  Musculoskeletal: negative  Neurological: no TIA or stroke symptoms      OBJECTIVE:     Vital Signs (Most Recent)     Physical Exam:  General: no acute distress  Mental Status: alert and oriented to person, place and time  HEENT: normocephalic, atraumatic  Chest: mild labored breathing  Heart: regular heart rate  Abdomen: +distended with +fluid-wave. No TTP/r/g.  Extremity: moves all extremities    Laboratory  Lab Results   Component Value Date    INR 1.0 08/05/2021       Lab Results   Component Value Date    WBC 4.58 12/14/2021    HGB 14.1 12/14/2021    HCT 44.3 12/14/2021    MCV 96 12/14/2021     12/14/2021      Lab Results   Component Value Date     (H) 12/14/2021     12/14/2021    K 3.5 12/14/2021    CL 98 12/14/2021    CO2 32 (H) 12/14/2021    BUN 59 (H) 12/14/2021    CREATININE 1.3 12/14/2021    CALCIUM 8.6 (L) 12/14/2021    MG 2.1 05/31/2019    ALT 18 12/14/2021    AST 20 12/14/2021    ALBUMIN 2.7 (L) 12/14/2021    BILITOT 0.4 12/14/2021     ASSESSMENT/PLAN:     60 y/o M with CKD III and combined systolic/diastolic CHF complicated by recurrent abdominal distension and pain 2/2 recurrent painful, tense ascites requiring frequent therapeutic large-volume paracentesis.        1. Recurrent painful, tense ascites - Will attempt US-guided percutaneous RLQ-approacchtherapeutic LVP with local anesthetic only and albumin infusion post PRN as indicated per institutional protocol.      Risks (including, but not limited  to, pain, bleeding, infection, damage to nearby structures, failure to obtain sufficient material for a diagnosis, the need for additional procedures, and death), benefits, and alternatives were discussed with the patient. All questions were answered to the best of my abilities. The patient wishes to proceed with the procedure. Written informed consent was obtained.     Thank you for considering IR for the care of your patient.      Zach Cha MD  Interventional Radiology

## 2022-02-24 NOTE — DISCHARGE SUMMARY
Radiology Discharge Summary      Hospital Course: No complications    Admit Date: 2/24/2022  Discharge Date: 02/24/2022     Instructions Given to Patient: Yes  Diet: Resume prior diet  Activity: activity as tolerated    Description of Condition on Discharge: Stable  Vital Signs (Most Recent): BP: 130/64 (02/24/22 1009)    Discharge Disposition: Home    Discharge Diagnosis:   62 y/o M with h/o recurrent painful, tense ascites s/p successful US-guided percutaneous RLQ-approach therapeutic LVP with local anesthetic only and albumin infusion post as indicated per institutional protocol. Patient tolerated the procedures well. No immediate post-procedural complications noted.      Thank you for considering IR for the care of your patient.      Zach Cha MD  Interventional Radiology

## 2022-03-03 NOTE — BRIEF OP NOTE
Radiology Post-Procedure Note     Pre Op Diagnosis: Recurrent painful, tense ascites  Post Op Diagnosis: Same     Procedure: 1. US-guided percutaneous RLQ-approach therapeutic LVP     Procedure performed by: Zach Cha MD     Written Informed Consent Obtained: Yes  Specimen Removed: YES, 4,500-L of slightly cloudy, serous fluid  Estimated Blood Loss: none     Findings:   Successful US-guided percutaneous RLQ-approach therapeutic LVP with local anesthetic only and albumin infusion post PRN as indicated per institutional protocol. Patient tolerated the procedure well. No immediate post-procedural complications noted.      Thank you for considering IR for the care of your patient.      Zach Cha MD  Interventional Radiology

## 2022-03-03 NOTE — H&P
Radiology History & Physical      SUBJECTIVE:     Chief Complaint: Recurrent painful, tense ascites      History of Present Illness:  Marvin Ray is a 61 y.o. male with PMHx of CKD III and combined systolic/diastolic CHF complicated by recurrent abdominal distension and pain 2/2 recurrent painful, tense ascites requiring frequent  therapeutic large-volume paracentesis.      A new outpatient IR consult placed for US-guided percutaneous RLQ-approach therapeutic LVP.    Past Medical History:   Diagnosis Date    Arthritis     Cellulitis     CKD (chronic kidney disease), stage III     Coronary artery disease     Diabetes mellitus     Diabetic retinopathy     Diabetic ulcer of left foot     Glaucoma     Gout     Hyperlipemia     Hypertension     ICD (implantable cardioverter-defibrillator) in place 11/02/2018    Left chest    Non-pressure chronic ulcer of other part of left foot with fat layer exposed 10/23/2018    PVD (peripheral vascular disease)     Type 2 diabetes mellitus with left diabetic foot ulcer 10/29/2018    Unsteady gait     uses a wheelchair     Past Surgical History:   Procedure Laterality Date    AHMED GLAUCOMA IMPLANT Left 2011    DONE AT [facility]    AORTOGRAPHY WITH SERIALOGRAPHY Left 4/30/2019    Procedure: AORTOGRAM, WITH SERIALOGRAPHY, LEFT LOWER EXTREMITY, POSSIBLE INTERVENTION;  Surgeon: Mitch Chan MD;  Location: [facility] OR;  Service: Vascular;  Laterality: Left;  RN PRE OP 4-23-19.  NO H & P, No Cosent  [place]    AORTOGRAPHY WITH SERIALOGRAPHY Right 10/6/2020    Procedure: AORTOGRAM, WITH SERIALOGRAPHY, RIGHT LOWER EXTREMITY ANGIOGRAM, POSSIBLE INTERVENTION;  Surgeon: Mitch Chan MD;  Location: [facility] OR;  Service: Vascular;  Laterality: Right;  RN PREOP 10/1/2020--COVID NEGATIVE ON 10/5    AORTOGRAPHY WITH SERIALOGRAPHY Right 1/13/2021    Procedure: AORTOGRAM, WITH RIGHT LOWER EXTREMITY ANGIOGRAM, POSSIBLE INTERVENTION;  Surgeon: Mitch Chan MD;   Location: [facility] OR;  Service: Vascular;  Laterality: Right;  1PM START----NEED CONSENT  RN PREOP 1/8/2021--COVID NEGATIVE ON 1/12    AORTOGRAPHY WITH SERIALOGRAPHY Right 1/26/2021    Procedure: AORTOGRAM, RIGHT LOWER EXTREMITY ANGIOGRAM, POSSIBLE INTERVENTION;  Surgeon: Mitch Chan MD;  Location: [facility] OR;  Service: Vascular;  Laterality: Right;  RN PREOP 1/25/2021, COVID NEGATIVE ON 1/25, cxr, and ecg done    AORTOGRAPHY WITH SERIALOGRAPHY Right 3/30/2021    Procedure: AORTOGRAM, WITH RIGHT LOWER EXTREMITY ANGIOGRAM, POSSIBLE INTERVENTION;  Surgeon: Mitch Chan MD;  Location: [facility] OR;  Service: Vascular;  Laterality: Right;  RN PREOP 3/26/2021  COVID NEGATIVE    BAERVELDT GLAUCOMA IMPLANT Left 2012    WITH CATARACT EXTRACTION//DONE AT [facility]    BELOW KNEE AMPUTATION OF LOWER EXTREMITY Right 5/17/2021    Procedure: AMPUTATION, BELOW KNEE;  Surgeon: Christiana Pritchett MD;  Location: [facility] OR;  Service: Orthopedics;  Laterality: Right;  WOUND VAC -PREVENA  SUPINE---HAS--ICD- Iast interrogation -3/21  AMPUTATION TRAY  1ST CASE OKWHITNEY WHITLOCK  RN PREOP 5/13/2021     COVID --NEGATIVE ON 5/14---INCOMPLETE H/P    BIOPSY Right 12/18/2020    Procedure: Biopsy- trocar biopsy;  Surgeon: Brianna Champion DPM;  Location: [facility] OR;  Service: Podiatry;  Laterality: Right;    CARDIAC CATHETERIZATION Left 05/2016    CARDIAC DEFIBRILLATOR PLACEMENT Left 11/02/2018    CATARACT EXTRACTION W/  INTRAOCULAR LENS IMPLANT Left 2012    WITH BAERVEDT (DONE AT [facility])    CATARACT EXTRACTION W/  INTRAOCULAR LENS IMPLANT Right 09/26/2018    COMPLEX ()    CB DESTRUCTION WITH CYCLO G6 Left 02/15/2017        CYST REMOVAL      DEBRIDEMENT OF FOOT  12/28/2018    Procedure: DEBRIDEMENT, FOOT;  Surgeon: Mitch Chan MD;  Location: [facility] OR;  Service: Vascular;;    DEBRIDEMENT OF FOOT  6/5/2019    Procedure: DEBRIDEMENT, FOOT;  Surgeon: Mitch Chan MD;  Location: [facility] OR;  Service:  Vascular;;    DEBRIDEMENT OF FOOT Right 3/5/2021    Procedure: DEBRIDEMENT, FOOT with Misonic device;  Surgeon: Mitch Wall MD;  Location: [facility] OR;  Service: Vascular;  Laterality: Right;    EXAMINATION UNDER ANESTHESIA Left 12/5/2018    Procedure: Exam under anesthesia, left foot debridement, washout and all other indicated procedures;  Surgeon: Mitch Wall MD;  Location: [facility] OR;  Service: Vascular;  Laterality: Left;  1030AM START  RN PREOP 12/3/2018-----AICD  SEEN BY DR JAMES 12/4    EXAMINATION UNDER ANESTHESIA Left 2/13/2019    Procedure: LEFT FOOT WOUND DEBRIDEMENT, WASHOUT AND ALL OTHER INDICATED PROCEDURES;  Surgeon: Mitch Wall MD;  Location: [facility] OR;  Service: Vascular;  Laterality: Left;  requesting 1st case start  THIS CASE 1ST PER DR WALL ON 2/1/19 @ 844AM -LO  RN PREOP 2/6/2019  HAS CARDIAC CLEARANCE    EXAMINATION UNDER ANESTHESIA Left 12/28/2018    Procedure: Exam under anesthesia, left foot debridement, left foot washout, possible left second through fifth metatarsal resection;  Surgeon: Mitch Wall MD;  Location: [facility] OR;  Service: Vascular;  Laterality: Left;  1:00pm start per julissa @ 8:58am  RN  PHONE PRE OP 12-27-18--=Pt coming [facility]  NEED CONSENT    EXAMINATION UNDER ANESTHESIA Left 6/5/2019    Procedure: LEFT FOOT DEBRIDEMENT, WASHOUT AND ALL OTHER INDICATED PROCEDURES;  Surgeon: Mitch Wall MD;  Location: [facility] OR;  Service: Vascular;  Laterality: Left;  RN PRE OP 5-31-19------ICD------NEED CONSENT    EXAMINATION UNDER ANESTHESIA Right 11/9/2020    Procedure: RIGHT FOOT DEBRIDEMENT, WASHOUT AND ALL OTHER INDICATED PROCEDURES;  Surgeon: Mitch Wall MD;  Location: [facility] OR;  Service: Vascular;  Laterality: Right;  RN PREOP 10/27/2020, --COVID NEGATIVE ON 11/6, has cardiac clearance/[place] 10/27/2020, pt has ICD  NEEDS ORDERS    EXAMINATION UNDER ANESTHESIA Right 2/4/2021    Procedure: RIGHT FOOT DEBRIDEMENT,  POSSIBLE CALCANECTOMY, POSSIBLE FIFTH TOE AMPUTATION, WASHOUT AND ALL OTHER INDICATED PROCEDURES;  Surgeon: Mitch Chan MD;  Location: [facility] OR;  Service: Vascular;  Laterality: Right;  RN PREOP 2/2/2021--COVID NEGATIVE ON 2/2  ICD    EYE SURGERY      CAT EXT OU    FOOT AMPUTATION THROUGH METATARSAL Right 3/5/2021    Procedure: Right foot wound debridement, possible transmetatarsal amputation, possible fifth toe amputation, washout;  Surgeon: Mitch Chan MD;  Location: [facility] OR;  Service: Vascular;  Laterality: Right;  MISONIX SONIC ONE JAYDEN LOUISE [phone] SPOKE TO HIM ON MY OFFICE @ 7:31AM ON 2-  RN PRE Op, Covid NEGATIVE ON  3-2-21.  C A  8:30AM START----NEED H/P    INCISION AND DRAINAGE Left 11/14/2018    Procedure: Incision and Drainage, left lower extremity debridement, washout;  Surgeon: Mitch Chan MD;  Location: [facility] OR;  Service: Vascular;  Laterality: Left;    INCISION AND DRAINAGE Left 11/16/2018    Procedure: INCISION AND DRAINAGE;  Surgeon: Mitch Chan MD;  Location: [facility] OR;  Service: Vascular;  Laterality: Left;    INCISION AND DRAINAGE Right 11/18/2020    Procedure: Debridement and washout right lower extremity wounds;  Surgeon: Mitch Chan MD;  Location: [facility] OR 2ND FLR;  Service: Vascular;  Laterality: Right;    INCISION AND DRAINAGE Right 1/27/2021    Procedure: RIGHT FOOT DEBRIDEMENT, WASHOUT AND ALL OTHER INDICATED PROCEDURES;  Surgeon: Mitch Chan MD;  Location: [facility] OR 2ND FLR;  Service: Vascular;  Laterality: Right;    INTRAOCULAR PROSTHESES INSERTION Right 9/26/2018    Procedure: INSERTION, IOL PROSTHESIS;  Surgeon: Perla Cortés MD;  Location: [facility] OR 1ST FLR;  Service: Ophthalmology;  Laterality: Right;    PERITONEOCENTESIS N/A 3/1/2019    Procedure: PARACENTESIS, ABDOMINAL, INITIAL;  Surgeon: [facility] Diagnostic Provider;  Location: [facility] OR;  Service: Radiology;  Laterality: N/A;    PERITONEOCENTESIS N/A  3/25/2019    Procedure: PARACENTESIS, ABDOMINAL, INITIAL;  Surgeon: Dosc Diagnostic Provider;  [facility] OR;  Service: Radiology;  Laterality: N/A;    PERITONEOCENTESIS N/A 7/22/2019    Procedure: PARACENTESIS, ABDOMINAL, INITIAL;  Surgeon: Dosc Diagnostic Provider;  Location: [facility] OR;  Service: Radiology;  Laterality: N/A;    PERITONEOCENTESIS N/A 8/16/2019    Procedure: PARACENTESIS, ABDOMINAL, INITIAL;  Surgeon: Dosc Diagnostic Provider;  [facility];  Service: Radiology;  Laterality: N/A;    PERITONEOCENTESIS N/A 9/26/2019    Procedure: PARACENTESIS, ABDOMINAL;  Surgeon: Dosc Diagnostic Provider;  [facility];  Service: Radiology;  Laterality: N/A;    PERITONEOCENTESIS N/A 10/11/2019    Procedure: PARACENTESIS, ABDOMINAL;  Surgeon: Dosc Diagnostic Provider;  [facility];  Service: Radiology;  Laterality: N/A;    PERITONEOCENTESIS N/A 10/31/2019    Procedure: PARACENTESIS, ABDOMINAL;  Surgeon: Dosc Diagnostic Provider;  [facility];  Service: Radiology;  Laterality: N/A;    PERITONEOCENTESIS N/A 11/18/2019    Procedure: PARACENTESIS, ABDOMINAL;  Surgeon: Dosc Diagnostic Provider;  [facility];  Service: Radiology;  Laterality: N/A;    PERITONEOCENTESIS N/A 12/16/2019    Procedure: PARACENTESIS, ABDOMINAL;  Surgeon: Dosc Diagnostic Provider;  [facility];  Service: Radiology;  Laterality: N/A;  2PM START    PERITONEOCENTESIS N/A 2/7/2020    Procedure: PARACENTESIS, ABDOMINAL;  Surgeon: Dosc Diagnostic Provider;  [facility];  Service: Radiology;  Laterality: N/A;  REQUESTED 10:30A START    PERITONEOCENTESIS N/A 2/19/2020    Procedure: PARACENTESIS, ABDOMINAL;  Surgeon: Dosc Diagnostic Provider;  [facility];  Service: Radiology;  Laterality: N/A;    PERITONEOCENTESIS N/A 2/28/2020    Procedure: PARACENTESIS, ABDOMINAL;  Surgeon: Dosc Diagnostic Provider;  Location: [facility] OR;  Service: Radiology;  Laterality: N/A;  REQUESTED 10AM START     PHACOEMULSIFICATION OF CATARACT Right 9/26/2018    Procedure: PHACOEMULSIFICATION, CATARACT;  Surgeon: Perla Cortés MD;  Location: [facility] OR [address] [place] [address];  Service: Ophthalmology;  Laterality: Right;    REPEAT ABDOMINAL PARACENTESIS N/A 2/11/2019    Procedure: PARACENTESIS, ABDOMINAL, REPEAT;  Surgeon: Dosc Diagnostic Provider;  Location: [facility] OR;  Service: Radiology;  Laterality: N/A;  1PM START    REPEAT ABDOMINAL PARACENTESIS N/A 9/12/2019    Procedure: PARACENTESIS, ABDOMINAL, REPEAT;  Surgeon: Dosc Diagnostic Provider;  Location: [facility] OR;  Service: Radiology;  Laterality: N/A;  9AM START    REPEAT ABDOMINAL PARACENTESIS N/A 12/2/2019    Procedure: PARACENTESIS, ABDOMINAL, REPEAT;  Surgeon: Dosc Diagnostic Provider;  Location: [facility] OR;  Service: Radiology;  Laterality: N/A;  3PM START    REPEAT ABDOMINAL PARACENTESIS N/A 1/10/2020    Procedure: PARACENTESIS, ABDOMINAL, REPEAT;  Surgeon: Dosc Diagnostic Provider;  Location: [facility] OR;  Service: Radiology;  Laterality: N/A;  1130AM START    REPEAT ABDOMINAL PARACENTESIS N/A 1/20/2020    Procedure: PARACENTESIS, ABDOMINAL, REPEAT;  Surgeon: Dosc Diagnostic Provider;  Location: [facility] OR;  Service: Radiology;  Laterality: N/A;  1PM START    REPEAT ABDOMINAL PARACENTESIS N/A 1/31/2020    Procedure: PARACENTESIS, ABDOMINAL, REPEAT;  Surgeon: Dosc Diagnostic Provider;  Location: [facility] OR;  Service: Radiology;  Laterality: N/A;  10AM START    REPEAT ABDOMINAL PARACENTESIS N/A 3/16/2020    Procedure: PARACENTESIS, ABDOMINAL, REPEAT;  Surgeon: Dosc Diagnostic Provider;  Location: [facility] OR;  Service: Radiology;  Laterality: N/A;  10AM START    REPEAT ABDOMINAL PARACENTESIS N/A 3/30/2020    Procedure: PARACENTESIS, ABDOMINAL, REPEAT;  Surgeon: Dosc Diagnostic Provider;  Location: [facility] OR;  Service: Radiology;  Laterality: N/A;    REPEAT ABDOMINAL PARACENTESIS N/A 4/9/2020    Procedure: PARACENTESIS, ABDOMINAL, REPEAT;  Surgeon: Dosc Diagnostic Provider;  Location: [facility]  OR;  Service: Radiology;  Laterality: N/A;  1130AM START    REPEAT ABDOMINAL PARACENTESIS N/A 5/8/2020    Procedure: PARACENTESIS, ABDOMINAL, REPEAT;  Surgeon: Dosc Diagnostic Provider;  Location: [facility] OR;  Service: Radiology;  Laterality: N/A;  9AM START    REPEAT ABDOMINAL PARACENTESIS N/A 5/21/2020    Procedure: PARACENTESIS, ABDOMINAL, REPEAT;  Surgeon: Dosc Diagnostic Provider;  Location: [facility] OR;  Service: Radiology;  Laterality: N/A;  10AM START    REPEAT ABDOMINAL PARACENTESIS N/A 6/5/2020    Procedure: PARACENTESIS, ABDOMINAL, REPEAT;  Surgeon: Dosc Diagnostic Provider;  Location: [facility] OR;  Service: Radiology;  Laterality: N/A;  NOON START    REPEAT ABDOMINAL PARACENTESIS N/A 7/10/2020    Procedure: PARACENTESIS, ABDOMINAL, REPEAT;  Surgeon: Dosc Diagnostic Provider;  Location: [facility] OR;  Service: Radiology;  Laterality: N/A;  1PM START    REPEAT ABDOMINAL PARACENTESIS N/A 8/20/2020    Procedure: PARACENTESIS, ABDOMINAL, REPEAT;  Surgeon: Dosc Diagnostic Provider;  Location: [facility] OR;  Service: Radiology;  Laterality: N/A;  1PM START    REPEAT ABDOMINAL PARACENTESIS N/A 9/4/2020    Procedure: PARACENTESIS, ABDOMINAL, REPEAT;  Surgeon: Dosc Diagnostic Provider;  Location: [facility] OR;  Service: Radiology;  Laterality: N/A;  11 AM START    REPEAT ABDOMINAL PARACENTESIS N/A 9/16/2020    Procedure: PARACENTESIS, ABDOMINAL, REPEAT;  Surgeon: Dosc Diagnostic Provider;  Location: [facility] OR;  Service: Radiology;  Laterality: N/A;  START 2:00 P.M.    REPEAT ABDOMINAL PARACENTESIS N/A 9/28/2020    Procedure: PARACENTESIS, ABDOMINAL, REPEAT;  Surgeon: Dosc Diagnostic Provider;  Location: [facility] OR;  Service: Radiology;  Laterality: N/A;  1 P.M. START    REPEAT ABDOMINAL PARACENTESIS N/A 11/3/2020    Procedure: PARACENTESIS, ABDOMINAL, REPEAT;  Surgeon: Dosc Diagnostic Provider;  Location: [facility] OR;  Service: Radiology;  Laterality: N/A;  11AM START    REPEAT ABDOMINAL PARACENTESIS N/A 11/13/2020    Procedure:  PARACENTESIS, ABDOMINAL, REPEAT;  Surgeon: Dosc Diagnostic Provider;  Location: [facility] OR;  Service: Radiology;  Laterality: N/A;  12PM START    REPEAT ABDOMINAL PARACENTESIS N/A 11/23/2020    Procedure: PARACENTESIS, ABDOMINAL, REPEAT;  Surgeon: Dosc Diagnostic Provider;  [facility];  Service: Radiology;  Laterality: N/A;    REPEAT ABDOMINAL PARACENTESIS N/A 12/1/2020    Procedure: PARACENTESIS, ABDOMINAL, REPEAT;  Surgeon: Dosc Diagnostic Provider;  Location: [facility] OR;  Service: Radiology;  Laterality: N/A;  11AM START    REPEAT ABDOMINAL PARACENTESIS N/A 12/8/2020    Procedure: PARACENTESIS, ABDOMINAL, REPEAT;  Surgeon: Dosc Diagnostic Provider;  Location: [facility] OR;  Service: Radiology;  Laterality: N/A;  93AM START    REPEAT ABDOMINAL PARACENTESIS N/A 12/17/2020    Procedure: PARACENTESIS, ABDOMINAL, REPEAT;  Surgeon: Dosc Diagnostic Provider;  Location: [facility] OR;  Service: Radiology;  Laterality: N/A;  10AM START    REPEAT ABDOMINAL PARACENTESIS N/A 12/30/2020    Procedure: PARACENTESIS, ABDOMINAL, REPEAT;  Surgeon: Dosc Diagnostic Provider;  Location: [facility] OR;  Service: Radiology;  Laterality: N/A;  9 A.M. START    REPEAT ABDOMINAL PARACENTESIS N/A 1/12/2021    Procedure: PARACENTESIS, ABDOMINAL, REPEAT;  Surgeon: Dos Diagnostic Provider;  Location: [facility] OR;  Service: Radiology;  Laterality: N/A;  10 AM START    REPEAT ABDOMINAL PARACENTESIS N/A 2/10/2021    Procedure: PARACENTESIS, ABDOMINAL, REPEAT;  Surgeon: Dosc Diagnostic Provider;  Location: [facility] OR;  Service: Radiology;  Laterality: N/A;  2 P.M. START    REPEAT ABDOMINAL PARACENTESIS N/A 2/18/2021    Procedure: PARACENTESIS, ABDOMINAL, REPEAT;  Surgeon: Dosc Diagnostic Provider;  Location: [facility] OR;  Service: Radiology;  Laterality: N/A;  230PM START    REPEAT ABDOMINAL PARACENTESIS N/A 2/26/2021    Procedure: PARACENTESIS, ABDOMINAL, REPEAT;  Surgeon: Dosc Diagnostic Provider;  [facility];  Service: Radiology;  Laterality: N/A;     REPEAT ABDOMINAL PARACENTESIS N/A 3/4/2021    Procedure: PARACENTESIS, ABDOMINAL, REPEAT;  Surgeon: Dosc Diagnostic Provider;  Location: [facility] OR;  Service: Radiology;  Laterality: N/A;  9AM START    REPEAT ABDOMINAL PARACENTESIS N/A 3/12/2021    Procedure: PARACENTESIS, ABDOMINAL, REPEAT;  Surgeon: Dosc Diagnostic Provider;  Location: [facility] OR;  Service: Radiology;  Laterality: N/A;  10:00 START TIME  NO PRE-OP REQUIRED    REPEAT ABDOMINAL PARACENTESIS N/A 4/7/2021    Procedure: PARACENTESIS, ABDOMINAL, REPEAT;  Surgeon: Dosc Diagnostic Provider;  Location: [facility] OR;  Service: Radiology;  Laterality: N/A;  1 P.M. START    REPEAT ABDOMINAL PARACENTESIS N/A 4/16/2021    Procedure: PARACENTESIS, ABDOMINAL, REPEAT;  Surgeon: Dos Diagnostic Provider;  Location: [facility] OR;  Service: Radiology;  Laterality: N/A;  9:OO START  NO PRE-OP REQ'D    REPEAT ABDOMINAL PARACENTESIS N/A 4/26/2021    Procedure: PARACENTESIS, ABDOMINAL, REPEAT;  Surgeon: Dos Diagnostic Provider;  Location: [facility] OR;  Service: Radiology;  Laterality: N/A;  9AM START    REPEAT ABDOMINAL PARACENTESIS N/A 5/6/2021    Procedure: PARACENTESIS, ABDOMINAL, REPEAT;  Surgeon: Dos Diagnostic Provider;  Location: [facility] OR;  Service: Radiology;  Laterality: N/A;  3PM START    REPEAT ABDOMINAL PARACENTESIS N/A 5/14/2021    Procedure: PARACENTESIS, ABDOMINAL, REPEAT;  Surgeon: Dosc Diagnostic Provider;  Location: [facility] OR;  Service: Radiology;  Laterality: N/A;  1:00PM START  NO PRE-OP REQ'D    REPEAT ABDOMINAL PARACENTESIS N/A 5/24/2021    Procedure: PARACENTESIS, ABDOMINAL, REPEAT;  Surgeon: Dosc Diagnostic Provider;  Location: [facility] OR;  Service: Radiology;  Laterality: N/A;  2 P.M START TIME    REPEAT ABDOMINAL PARACENTESIS N/A 6/4/2021    Procedure: PARACENTESIS, ABDOMINAL, REPEAT;  Surgeon: Dosc Diagnostic Provider;  Location: [facility] OR;  Service: Radiology;  Laterality: N/A;  11AM START    REPEAT ABDOMINAL PARACENTESIS N/A 6/14/2021    Procedure:  PARACENTESIS, ABDOMINAL, REPEAT;  Surgeon: [facility] Diagnostic Provider;  Location: [facility] OR;  Service: Radiology;  Laterality: N/A;  8AM START    REPEAT ABDOMINAL PARACENTESIS N/A 6/23/2021    Procedure: PARACENTESIS, ABDOMINAL, REPEAT;  Surgeon: [facility] Diagnostic Provider;  Location: [facility] OR;  Service: Radiology;  Laterality: N/A;    REVISION OF PROCEDURE INVOLVING GLAUCOMA DRAINAGE DEVICE Left 10/2/2019    EXTRUDING BAERVELDT /WITH PERICARDIAL PATCH GRAFT ()    Right foot surgery  10/2014    TOE AMPUTATION Right     first and second    TOE AMPUTATION Left 11/16/2018    Procedure: AMPUTATION, TOES  2-5;  Surgeon: Mitch Chan MD;  Location: [facility] OR;  Service: Vascular;  Laterality: Left;    TOE AMPUTATION Left 12/5/2018    Procedure: AMPUTATION, TOE;  Surgeon: Mitch Chan MD;  Location: [facility] OR;  Service: Vascular;  Laterality: Left;  left great toe    TONSILLECTOMY       Home Meds:   Prior to Admission medications    Medication Sig Start Date End Date Taking? Authorizing Provider   acetaminophen (TYLENOL) 325 MG tablet Take 650 mg by mouth every 6 (six) hours as needed for Pain.    Historical Provider   allopurinoL (ZYLOPRIM) 300 MG tablet Take 1 tablet (300 mg total) by mouth once daily. 9/28/21   Rubén Davis MD   aspirin (ECOTRIN) 81 MG EC tablet Take 81 mg by mouth once daily.    Historical Provider   blood sugar diagnostic Strp To check BG 2 times daily, to use with insurance preferred meter. e11.65 1/18/22   Sheryl Madison NP   blood-glucose meter kit To check BG 2 times daily, to use with insurance preferred meter 1/18/22 1/18/23  Sheryl Madison NP   brimonidine 0.2% (ALPHAGAN) 0.2 % Drop INSTILL 1 DROP IN BOTH EYES THREE TIMES DAILY 1/9/22   Perla Cortés MD   carvediloL (COREG) 3.125 MG tablet Take 1 tablet (3.125 mg total) by mouth 2 (two) times daily with meals. 2/14/22   Rubén Davis MD   clopidogreL (PLAVIX) 75 mg tablet Take 1 tablet (75 mg total) by mouth  "once daily. 1/14/21 1/14/22  Mitch Chan MD   coenzyme Q10 100 mg capsule Take 100 mg by mouth every morning.    Historical Provider   collagenase (SANTYL) ointment Apply topically to diabetic ulcer on lower right leg once daily. 4/13/21   Rubén Davis MD   dorzolamide-timolol 2-0.5% (COSOPT) 22.3-6.8 mg/mL ophthalmic solution Place 1 drop into both eyes 3 (three) times daily. 12/16/21   Perla Cortés MD   ezetimibe (ZETIA) 10 mg tablet TAKE 1 TABLET(10 MG) BY MOUTH EVERY DAY 12/30/21   Rubén Davis MD   fish oil-omega-3 fatty acids 300-1,000 mg capsule Take 1 capsule by mouth 2 (two) times daily. 1/23/19   Sara Ching MD   gabapentin (NEURONTIN) 100 MG capsule TAKE 2 CAPSULES(200 MG) BY MOUTH EVERY EVENING 10/8/21   Rubén Davis MD   HYDROcodone-acetaminophen (NORCO) 5-325 mg per tablet Take 1 tablet by mouth every 12 (twelve) hours as needed for Pain. 9/21/21   Rubén Davis MD   insulin degludec-liraglutide (XULTOPHY 100/3.6) 100 unit-3.6 mg /mL (3 mL) Pen Inject 35 Units into the skin once daily. 1/18/22   Sheryl Madison NP   lancets Misc To check BG 2 times daily, to use with insurance preferred meter. Dx code e11.65 1/18/22   Sheryl Madison NP   loratadine (CLARITIN) 10 mg tablet Take 10 mg by mouth daily as needed for Allergies.    Historical Provider   metOLazone (ZAROXOLYN) 2.5 MG tablet TAKE 1 TABLET(2.5 MG) BY MOUTH EVERY DAY 11/30/21   Rubén Davis MD   MULTIVIT,THER IRON,CA,FA & MIN (MULTIVITAMIN) Tab Take 1 tablet by mouth every morning.     Historical Provider   pen needle, diabetic (BD ULTRA-FINE AMADOR PEN NEEDLE) 32 gauge x 5/32" Ndle 1 Device by Misc.(Non-Drug; Combo Route) route 2 (two) times a day. 9/11/20   Sheryl Madison NP   torsemide (DEMADEX) 20 MG Tab TAKE 4 TABLETS(80 MG) BY MOUTH TWICE DAILY 1/2/22   Rubén Davis MD     Anticoagulants/Antiplatelets: no anticoagulation    Allergies:   Review of patient's allergies indicates:   Allergen Reactions    " Statins-hmg-coa reductase inhibitors      Generalized Pain    Onglyza [saxagliptin]     Penicillins Rash     Sedation History:  no adverse reactions     Review of Systems:   Hematological: no known coagulopathies  Respiratory: positive for - orthopnea and shortness of breath  Cardiovascular: positive for - dyspnea on exertion, orthopnea and shortness of breath  Gastrointestinal: positive for - abdominal pain and distension  Genito-Urinary: no dysuria, trouble voiding, or hematuria  Musculoskeletal: negative  Neurological: no TIA or stroke symptoms      OBJECTIVE:     Vital Signs (Most Recent)     Physical Exam:  General: no acute distress  Mental Status: alert and oriented to person, place and time  HEENT: normocephalic, atraumatic  Chest: mild labored breathing  Heart: regular heart rate  Abdomen: +distended with +fluid-wave. No TTP/r/g.  Extremity: moves all extremities    Laboratory  Lab Results   Component Value Date    INR 1.0 08/05/2021       Lab Results   Component Value Date    WBC 4.58 12/14/2021    HGB 14.1 12/14/2021    HCT 44.3 12/14/2021    MCV 96 12/14/2021     12/14/2021      Lab Results   Component Value Date     (H) 12/14/2021     12/14/2021    K 3.5 12/14/2021    CL 98 12/14/2021    CO2 32 (H) 12/14/2021    BUN 59 (H) 12/14/2021    CREATININE 1.3 12/14/2021    CALCIUM 8.6 (L) 12/14/2021    MG 2.1 05/31/2019    ALT 18 12/14/2021    AST 20 12/14/2021    ALBUMIN 2.7 (L) 12/14/2021    BILITOT 0.4 12/14/2021     ASSESSMENT/PLAN:     60 y/o M with CKD III and combined systolic/diastolic CHF complicated by recurrent abdominal distension and pain 2/2 recurrent painful, tense ascites requiring frequent therapeutic large-volume paracentesis.        1. Recurrent painful, tense ascites - Will attempt US-guided percutaneous RLQ-approacchtherapeutic LVP with local anesthetic only and albumin infusion post PRN as indicated per institutional protocol.      Risks (including, but not limited  to, pain, bleeding, infection, damage to nearby structures, failure to obtain sufficient material for a diagnosis, the need for additional procedures, and death), benefits, and alternatives were discussed with the patient. All questions were answered to the best of my abilities. The patient wishes to proceed with the procedure. Written informed consent was obtained.     Thank you for considering IR for the care of your patient.      Zach Cha MD  Interventional Radiology

## 2022-03-03 NOTE — DISCHARGE SUMMARY
Radiology Discharge Summary      Hospital Course: No complications    Admit Date: 3/3/2022  Discharge Date: 03/03/2022     Instructions Given to Patient: Yes  Diet: Resume prior diet  Activity: activity as tolerated    Description of Condition on Discharge: Stable  Vital Signs (Most Recent): BP: 115/72 (03/03/22 1012)    Discharge Disposition: Home    Discharge Diagnosis:   60 y/o M with h/o recurrent painful, tense ascites s/p successful US-guided percutaneous RLQ-approach therapeutic LVP with local anesthetic only and albumin infusion post as indicated per institutional protocol. Patient tolerated the procedures well. No immediate post-procedural complications noted.      Thank you for considering IR for the care of your patient.      Zach Cha MD  Interventional Radiology

## 2022-03-07 NOTE — TELEPHONE ENCOUNTER
----- Message from Sheryl Madison NP sent at 3/7/2022 11:02 AM CST -----  Please ask pt if he's had a hard time getting Xultophy from pharmacy. If so, we can send to [facility] to help with PA.

## 2022-03-08 NOTE — TELEPHONE ENCOUNTER
Care Due:                  Date            Visit Type   Department     Provider  --------------------------------------------------------------------------------    Last Visit: None Found      None         None Found  Next Visit: 03-      WOUND CHECK  [facility]  Page                                                            Last  Test          Frequency    Reason                     Performed    Due Date  --------------------------------------------------------------------------------    Uric Acid...  12 months..  allopurinoL..............  Not Found    Overdue    Powered by [employer]. Reference number: [id number].   3/08/2022 11:37:29 AM CST

## 2022-03-08 NOTE — PROGRESS NOTES
[facility]                Progress Note    Subjective:       Patient ID: Marvin Ray is a 61 y.o. male.    Chief Complaint: Non-healing Wound Follow Up    Pt arrived to [facility] via  with caregiver at side. Pt able to SPT self without any assistance from >bed. Pt denies any fever, chills, or flu-like symptoms; denies pain/discomfort. Care giver reports using medihoney at home every 2-3 days & will continue with Medihoney.      Review of Systems   Constitutional: Negative.    HENT: Negative.    Eyes: Negative.    Respiratory: Negative.    Cardiovascular: Positive for leg swelling.   Gastrointestinal: Negative.    Skin: Positive for wound.   Psychiatric/Behavioral: Negative.          Objective:        Physical Exam  Constitutional:       Appearance: Normal appearance.   HENT:      Head: Normocephalic and atraumatic.      Right Ear: External ear normal.      Left Ear: External ear normal.      Mouth/Throat:      Mouth: Mucous membranes are moist.      Pharynx: Oropharynx is clear.   Eyes:      Extraocular Movements: Extraocular movements intact.      Conjunctiva/sclera: Conjunctivae normal.      Pupils: Pupils are equal, round, and reactive to light.   Cardiovascular:      Rate and Rhythm: Normal rate and regular rhythm.   Pulmonary:      Effort: Pulmonary effort is normal. No respiratory distress.   Abdominal:      General: There is no distension.      Palpations: Abdomen is soft.   Skin:     General: Skin is warm and dry.      Comments: +DFU to left foot with mild slough   Neurological:      Mental Status: He is alert.         Vitals:    03/08/22 1029   BP: 119/73   Pulse: 79   Resp: 16   Temp: 97.2 °F (36.2 °C)       Assessment:           ICD-10-CM ICD-9-CM   1. Diabetic ulcer of right lower leg  E11.622 250.80    L97.919 707.10            Wound 12/22/20 1608 Diabetic Ulcer Left distal Foot (Active)   12/22/20 1608    Pre-existing:    Primary Wound Type: Diabetic  ulc   Side: Left   Orientation: distal   Location: Foot   Wound Number:    Ankle-Brachial Index:    Pulses:    Removal Indication and Assessment:    Wound Outcome:    (Retired) Wound Type:    (Retired) Wound Length (cm):    (Retired) Wound Width (cm):    (Retired) Depth (cm):    Wound Description (Comments):    Removal Indications:    Wound Image   03/08/22 1000   Wound WDL ex 03/08/22 1000   Dressing Appearance Moist drainage 03/08/22 1000   Drainage Amount Small 03/08/22 1000   Drainage Characteristics/Odor Serosanguineous 03/08/22 1000   Appearance Red;Yellow 03/08/22 1000   Tissue loss description Full thickness 03/08/22 1000   Black (%), Wound Tissue Color 0 % 03/08/22 1000   Red (%), Wound Tissue Color 50 % 03/08/22 1000   Yellow (%), Wound Tissue Color 50 % 03/08/22 1000   Periwound Area Macerated 03/08/22 1000   Wound Edges Defined 03/08/22 1000   Wound Length (cm) 5.2 cm 03/08/22 1000   Wound Width (cm) 4 cm 03/08/22 1000   Wound Depth (cm) 0.2 cm 03/08/22 1000   Wound Volume (cm^3) 4.16 cm^3 03/08/22 1000   Wound Surface Area (cm^2) 20.8 cm^2 03/08/22 1000   Tunneling (depth (cm)/location) 0 03/08/22 1000   Undermining (depth (cm)/location) 0 03/08/22 1000   Care Cleansed with:;Sterile normal saline 03/08/22 1000   Dressing Applied;Honey 03/08/22 1000   Periwound Care Skin barrier film applied 03/08/22 1000   Off Loading Football dressing;Off loading shoe 03/08/22 1000            Wound 12/22/20 1305 Diabetic Ulcer Left medial Foot (Active)   12/22/20 1305    Pre-existing:    Primary Wound Type: Diabetic ulc   Side: Left   Orientation: medial   Location: Foot   Wound Number:    Ankle-Brachial Index:    Pulses:    Removal Indication and Assessment:    Wound Outcome:    (Retired) Wound Type:    (Retired) Wound Length (cm):    (Retired) Wound Width (cm):    (Retired) Depth (cm):    Wound Description (Comments):    Removal Indications:    Wound Image   03/08/22 1000   Wound WDL ex 03/08/22 1000   Dressing  Appearance Moist drainage 03/08/22 1000   Drainage Amount Small 03/08/22 1000   Drainage Characteristics/Odor Serosanguineous 03/08/22 1000   Appearance Red;Yellow 03/08/22 1000   Tissue loss description Full thickness 03/08/22 1000   Black (%), Wound Tissue Color 0 % 03/08/22 1000   Red (%), Wound Tissue Color 50 % 03/08/22 1000   Yellow (%), Wound Tissue Color 50 % 03/08/22 1000   Periwound Area Macerated 03/08/22 1000   Wound Edges Defined 03/08/22 1000   Wound Length (cm) 5.7 cm 03/08/22 1000   Wound Width (cm) 5.1 cm 03/08/22 1000   Wound Depth (cm) 0.2 cm 03/08/22 1000   Wound Volume (cm^3) 5.814 cm^3 03/08/22 1000   Wound Surface Area (cm^2) 29.07 cm^2 03/08/22 1000   Tunneling (depth (cm)/location) 0 03/08/22 1000   Undermining (depth (cm)/location) 0 03/08/22 1000   Care Cleansed with:;Sterile normal saline 03/08/22 1000   Dressing Applied;Honey 03/08/22 1000   Periwound Care Skin barrier film applied 03/08/22 1000   Off Loading Football dressing;Off loading shoe 03/08/22 1000           Plan:                Orders Placed This Encounter   Procedures    Change dressing     Clean wound with NS. Gent Violet to periwound. Medihoney to all wounds, cover with gauze then abd pad. Light Football (cast padding x2, stockinet). Darco shoe. Change every 2 days per sister at home using medihoney, or as needed if drainage increases with strike-through or increased periwound maceration noted. Pt to return to [facility] in 3 wks.        Follow up in about 3 weeks (around 3/29/2022) for Wound Care.     Rubén Davis MD

## 2022-03-10 NOTE — H&P
Radiology History & Physical      SUBJECTIVE:     Chief Complaint: Recurrent painful, tense ascites      History of Present Illness:  Marvin Ray is a 61 y.o. male with PMHx of CKD III and combined systolic/diastolic CHF complicated by recurrent abdominal distension and pain 2/2 recurrent painful, tense ascites requiring frequent  therapeutic large-volume paracentesis.      A new outpatient IR consult placed for US-guided percutaneous RLQ-approach therapeutic LVP.    Past Medical History:   Diagnosis Date    Arthritis     Cellulitis     CKD (chronic kidney disease), stage III     Coronary artery disease     Diabetes mellitus     Diabetic retinopathy     Diabetic ulcer of left foot     Glaucoma     Gout     Hyperlipemia     Hypertension     ICD (implantable cardioverter-defibrillator) in place 11/02/2018    Left chest    Non-pressure chronic ulcer of other part of left foot with fat layer exposed 10/23/2018    PVD (peripheral vascular disease)     Type 2 diabetes mellitus with left diabetic foot ulcer 10/29/2018    Unsteady gait     uses a wheelchair     Past Surgical History:   Procedure Laterality Date    AHMED GLAUCOMA IMPLANT Left 2011    DONE AT [facility]    AORTOGRAPHY WITH SERIALOGRAPHY Left 4/30/2019    Procedure: AORTOGRAM, WITH SERIALOGRAPHY, LEFT LOWER EXTREMITY, POSSIBLE INTERVENTION;  Surgeon: Mitch Chan MD;  Location: [facility] OR;  Service: Vascular;  Laterality: Left;  RN PRE OP 4-23-19.  NO H & P, No Cosent  [place]    AORTOGRAPHY WITH SERIALOGRAPHY Right 10/6/2020    Procedure: AORTOGRAM, WITH SERIALOGRAPHY, RIGHT LOWER EXTREMITY ANGIOGRAM, POSSIBLE INTERVENTION;  Surgeon: Mitch Chan MD;  Location: [facility] OR;  Service: Vascular;  Laterality: Right;  RN PREOP 10/1/2020--COVID NEGATIVE ON 10/5    AORTOGRAPHY WITH SERIALOGRAPHY Right 1/13/2021    Procedure: AORTOGRAM, WITH RIGHT LOWER EXTREMITY ANGIOGRAM, POSSIBLE INTERVENTION;  Surgeon: Mitch Chan MD;   Location: [facility] OR;  Service: Vascular;  Laterality: Right;  1PM START----NEED CONSENT  RN PREOP 1/8/2021--COVID NEGATIVE ON 1/12    AORTOGRAPHY WITH SERIALOGRAPHY Right 1/26/2021    Procedure: AORTOGRAM, RIGHT LOWER EXTREMITY ANGIOGRAM, POSSIBLE INTERVENTION;  Surgeon: Mitch Chan MD;  Location: [facility] OR;  Service: Vascular;  Laterality: Right;  RN PREOP 1/25/2021, COVID NEGATIVE ON 1/25, cxr, and ecg done    AORTOGRAPHY WITH SERIALOGRAPHY Right 3/30/2021    Procedure: AORTOGRAM, WITH RIGHT LOWER EXTREMITY ANGIOGRAM, POSSIBLE INTERVENTION;  Surgeon: Mitch Chan MD;  Location: [facility] OR;  Service: Vascular;  Laterality: Right;  RN PREOP 3/26/2021  COVID NEGATIVE    BAERVELDT GLAUCOMA IMPLANT Left 2012    WITH CATARACT EXTRACTION//DONE AT [facility]    BELOW KNEE AMPUTATION OF LOWER EXTREMITY Right 5/17/2021    Procedure: AMPUTATION, BELOW KNEE;  Surgeon: Christiana Pritchett MD;  Location: [facility] OR;  Service: Orthopedics;  Laterality: Right;  WOUND VAC -PREVENA  SUPINE---HAS--ICD- Iast interrogation -3/21  AMPUTATION TRAY  1ST CASE OKWHITNEY WHITLOCK  RN PREOP 5/13/2021     COVID --NEGATIVE ON 5/14---INCOMPLETE H/P    BIOPSY Right 12/18/2020    Procedure: Biopsy- trocar biopsy;  Surgeon: Brianna Champion DPM;  Location: [facility] OR;  Service: Podiatry;  Laterality: Right;    CARDIAC CATHETERIZATION Left 05/2016    CARDIAC DEFIBRILLATOR PLACEMENT Left 11/02/2018    CATARACT EXTRACTION W/  INTRAOCULAR LENS IMPLANT Left 2012    WITH BAERVEDT (DONE AT [facility])    CATARACT EXTRACTION W/  INTRAOCULAR LENS IMPLANT Right 09/26/2018    COMPLEX ()    CB DESTRUCTION WITH CYCLO G6 Left 02/15/2017        CYST REMOVAL      DEBRIDEMENT OF FOOT  12/28/2018    Procedure: DEBRIDEMENT, FOOT;  Surgeon: Mitch Chan MD;  Location: [facility] OR;  Service: Vascular;;    DEBRIDEMENT OF FOOT  6/5/2019    Procedure: DEBRIDEMENT, FOOT;  Surgeon: Mitch Chan MD;  Location: [facility] OR;  Service:  Vascular;;    DEBRIDEMENT OF FOOT Right 3/5/2021    Procedure: DEBRIDEMENT, FOOT with Misonic device;  Surgeon: Mitch Wall MD;  Location: [facility] OR;  Service: Vascular;  Laterality: Right;    EXAMINATION UNDER ANESTHESIA Left 12/5/2018    Procedure: Exam under anesthesia, left foot debridement, washout and all other indicated procedures;  Surgeon: Mitch Wall MD;  Location: [facility] OR;  Service: Vascular;  Laterality: Left;  1030AM START  RN PREOP 12/3/2018-----AICD  SEEN BY DR JAMES 12/4    EXAMINATION UNDER ANESTHESIA Left 2/13/2019    Procedure: LEFT FOOT WOUND DEBRIDEMENT, WASHOUT AND ALL OTHER INDICATED PROCEDURES;  Surgeon: Mitch Wall MD;  Location: [facility] OR;  Service: Vascular;  Laterality: Left;  requesting 1st case start  THIS CASE 1ST PER DR WALL ON 2/1/19 @ 844AM -LO  RN PREOP 2/6/2019  HAS CARDIAC CLEARANCE    EXAMINATION UNDER ANESTHESIA Left 12/28/2018    Procedure: Exam under anesthesia, left foot debridement, left foot washout, possible left second through fifth metatarsal resection;  Surgeon: Mitch Wall MD;  Location: [facility] OR;  Service: Vascular;  Laterality: Left;  1:00pm start per julissa @ 8:58am  RN  PHONE PRE OP 12-27-18--=Pt coming from [facility]  NEED CONSENT    EXAMINATION UNDER ANESTHESIA Left 6/5/2019    Procedure: LEFT FOOT DEBRIDEMENT, WASHOUT AND ALL OTHER INDICATED PROCEDURES;  Surgeon: Mitch Wall MD;  Location: [facility] OR;  Service: Vascular;  Laterality: Left;  RN PRE OP 5-31-19------ICD------NEED CONSENT    EXAMINATION UNDER ANESTHESIA Right 11/9/2020    Procedure: RIGHT FOOT DEBRIDEMENT, WASHOUT AND ALL OTHER INDICATED PROCEDURES;  Surgeon: Mitch Wall MD;  Location: [facility] OR;  Service: Vascular;  Laterality: Right;  RN PREOP 10/27/2020, --COVID NEGATIVE ON 11/6, has cardiac clearance/[place] 10/27/2020, pt has ICD  NEEDS ORDERS    EXAMINATION UNDER ANESTHESIA Right 2/4/2021    Procedure: RIGHT FOOT DEBRIDEMENT,  POSSIBLE CALCANECTOMY, POSSIBLE FIFTH TOE AMPUTATION, WASHOUT AND ALL OTHER INDICATED PROCEDURES;  Surgeon: Mitch Chan MD;  Location: [facility] OR;  Service: Vascular;  Laterality: Right;  RN PREOP 2/2/2021--COVID NEGATIVE ON 2/2  ICD    EYE SURGERY      CAT EXT OU    FOOT AMPUTATION THROUGH METATARSAL Right 3/5/2021    Procedure: Right foot wound debridement, possible transmetatarsal amputation, possible fifth toe amputation, washout;  Surgeon: Mitch Chan MD;  Location: [facility] OR;  Service: Vascular;  Laterality: Right;  MISONIX SONIC ONE JAYDEN LOUISE [phone] SPOKE TO HIM ON MY OFFICE @ 7:31AM ON 2-  RN PRE Op, Covid NEGATIVE ON  3-2-21.  C A  8:30AM START----NEED H/P    INCISION AND DRAINAGE Left 11/14/2018    Procedure: Incision and Drainage, left lower extremity debridement, washout;  Surgeon: iMtch Chan MD;  Location: [facility] OR;  Service: Vascular;  Laterality: Left;    INCISION AND DRAINAGE Left 11/16/2018    Procedure: INCISION AND DRAINAGE;  Surgeon: Mitch Chan MD;  Location: [facility] OR;  Service: Vascular;  Laterality: Left;    INCISION AND DRAINAGE Right 11/18/2020    Procedure: Debridement and washout right lower extremity wounds;  Surgeon: Mitch Chan MD;  Location: [facility] OR 2ND FLR;  Service: Vascular;  Laterality: Right;    INCISION AND DRAINAGE Right 1/27/2021    Procedure: RIGHT FOOT DEBRIDEMENT, WASHOUT AND ALL OTHER INDICATED PROCEDURES;  Surgeon: Mitch Chan MD;  Location: [facility] OR 2ND FLR;  Service: Vascular;  Laterality: Right;    INTRAOCULAR PROSTHESES INSERTION Right 9/26/2018    Procedure: INSERTION, IOL PROSTHESIS;  Surgeon: Perla Cortés MD;  Location: [facility] OR 1ST FLR;  Service: Ophthalmology;  Laterality: Right;    PERITONEOCENTESIS N/A 3/1/2019    Procedure: PARACENTESIS, ABDOMINAL, INITIAL;  Surgeon: [facility] Diagnostic Provider;  Location: [facility] OR;  Service: Radiology;  Laterality: N/A;    PERITONEOCENTESIS N/A  3/25/2019    Procedure: PARACENTESIS, ABDOMINAL, INITIAL;  Surgeon: Dosc Diagnostic Provider;  [facility]: [facility] OR;  Service: Radiology;  Laterality: N/A;    PERITONEOCENTESIS N/A 7/22/2019    Procedure: PARACENTESIS, ABDOMINAL, INITIAL;  Surgeon: Dosc Diagnostic Provider;  Location: [facility] OR;  Service: Radiology;  Laterality: N/A;    PERITONEOCENTESIS N/A 8/16/2019    Procedure: PARACENTESIS, ABDOMINAL, INITIAL;  Surgeon: Dosc Diagnostic Provider;  [facility] OR;  Service: Radiology;  Laterality: N/A;    PERITONEOCENTESIS N/A 9/26/2019    Procedure: PARACENTESIS, ABDOMINAL;  Surgeon: Dosc Diagnostic Provider;  [facility];  Service: Radiology;  Laterality: N/A;    PERITONEOCENTESIS N/A 10/11/2019    Procedure: PARACENTESIS, ABDOMINAL;  Surgeon: Dosc Diagnostic Provider;  [facility];  Service: Radiology;  Laterality: N/A;    PERITONEOCENTESIS N/A 10/31/2019    Procedure: PARACENTESIS, ABDOMINAL;  Surgeon: Dosc Diagnostic Provider;  [facility];  Service: Radiology;  Laterality: N/A;    PERITONEOCENTESIS N/A 11/18/2019    Procedure: PARACENTESIS, ABDOMINAL;  Surgeon: Dosc Diagnostic Provider;  [facility];  Service: Radiology;  Laterality: N/A;    PERITONEOCENTESIS N/A 12/16/2019    Procedure: PARACENTESIS, ABDOMINAL;  Surgeon: Dosc Diagnostic Provider;  [facility];  Service: Radiology;  Laterality: N/A;  2PM START    PERITONEOCENTESIS N/A 2/7/2020    Procedure: PARACENTESIS, ABDOMINAL;  Surgeon: Dosc Diagnostic Provider;  [facility];  Service: Radiology;  Laterality: N/A;  REQUESTED 10:30A START    PERITONEOCENTESIS N/A 2/19/2020    Procedure: PARACENTESIS, ABDOMINAL;  Surgeon: Dosc Diagnostic Provider;  [facility];  Service: Radiology;  Laterality: N/A;    PERITONEOCENTESIS N/A 2/28/2020    Procedure: PARACENTESIS, ABDOMINAL;  Surgeon: Dosc Diagnostic Provider;  Location: [facility] OR;  Service: Radiology;  Laterality: N/A;  REQUESTED 10AM START     PHACOEMULSIFICATION OF CATARACT Right 9/26/2018    Procedure: PHACOEMULSIFICATION, CATARACT;  Surgeon: Perla Cortés MD;  Location: [facility] OR [address] [place] [address];  Service: Ophthalmology;  Laterality: Right;    REPEAT ABDOMINAL PARACENTESIS N/A 2/11/2019    Procedure: PARACENTESIS, ABDOMINAL, REPEAT;  Surgeon: Dosc Diagnostic Provider;  Location: [facility] OR;  Service: Radiology;  Laterality: N/A;  1PM START    REPEAT ABDOMINAL PARACENTESIS N/A 9/12/2019    Procedure: PARACENTESIS, ABDOMINAL, REPEAT;  Surgeon: Dosc Diagnostic Provider;  Location: [facility] OR;  Service: Radiology;  Laterality: N/A;  9AM START    REPEAT ABDOMINAL PARACENTESIS N/A 12/2/2019    Procedure: PARACENTESIS, ABDOMINAL, REPEAT;  Surgeon: Dosc Diagnostic Provider;  Location: [facility] OR;  Service: Radiology;  Laterality: N/A;  3PM START    REPEAT ABDOMINAL PARACENTESIS N/A 1/10/2020    Procedure: PARACENTESIS, ABDOMINAL, REPEAT;  Surgeon: Dosc Diagnostic Provider;  Location: [facility] OR;  Service: Radiology;  Laterality: N/A;  1130AM START    REPEAT ABDOMINAL PARACENTESIS N/A 1/20/2020    Procedure: PARACENTESIS, ABDOMINAL, REPEAT;  Surgeon: Dosc Diagnostic Provider;  Location: [facility] OR;  Service: Radiology;  Laterality: N/A;  1PM START    REPEAT ABDOMINAL PARACENTESIS N/A 1/31/2020    Procedure: PARACENTESIS, ABDOMINAL, REPEAT;  Surgeon: Dosc Diagnostic Provider;  Location: [facility] OR;  Service: Radiology;  Laterality: N/A;  10AM START    REPEAT ABDOMINAL PARACENTESIS N/A 3/16/2020    Procedure: PARACENTESIS, ABDOMINAL, REPEAT;  Surgeon: Dosc Diagnostic Provider;  Location: [facility] OR;  Service: Radiology;  Laterality: N/A;  10AM START    REPEAT ABDOMINAL PARACENTESIS N/A 3/30/2020    Procedure: PARACENTESIS, ABDOMINAL, REPEAT;  Surgeon: Dosc Diagnostic Provider;  Location: [facility] OR;  Service: Radiology;  Laterality: N/A;    REPEAT ABDOMINAL PARACENTESIS N/A 4/9/2020    Procedure: PARACENTESIS, ABDOMINAL, REPEAT;  Surgeon: Dosc Diagnostic Provider;  Location: [facility]  OR;  Service: Radiology;  Laterality: N/A;  1130AM START    REPEAT ABDOMINAL PARACENTESIS N/A 5/8/2020    Procedure: PARACENTESIS, ABDOMINAL, REPEAT;  Surgeon: Dosc Diagnostic Provider;  Location: [facility] OR;  Service: Radiology;  Laterality: N/A;  9AM START    REPEAT ABDOMINAL PARACENTESIS N/A 5/21/2020    Procedure: PARACENTESIS, ABDOMINAL, REPEAT;  Surgeon: Dosc Diagnostic Provider;  Location: [facility] OR;  Service: Radiology;  Laterality: N/A;  10AM START    REPEAT ABDOMINAL PARACENTESIS N/A 6/5/2020    Procedure: PARACENTESIS, ABDOMINAL, REPEAT;  Surgeon: Dosc Diagnostic Provider;  Location: [facility] OR;  Service: Radiology;  Laterality: N/A;  NOON START    REPEAT ABDOMINAL PARACENTESIS N/A 7/10/2020    Procedure: PARACENTESIS, ABDOMINAL, REPEAT;  Surgeon: Dosc Diagnostic Provider;  Location: [facility] OR;  Service: Radiology;  Laterality: N/A;  1PM START    REPEAT ABDOMINAL PARACENTESIS N/A 8/20/2020    Procedure: PARACENTESIS, ABDOMINAL, REPEAT;  Surgeon: Dosc Diagnostic Provider;  Location: [facility] OR;  Service: Radiology;  Laterality: N/A;  1PM START    REPEAT ABDOMINAL PARACENTESIS N/A 9/4/2020    Procedure: PARACENTESIS, ABDOMINAL, REPEAT;  Surgeon: Dosc Diagnostic Provider;  Location: [facility] OR;  Service: Radiology;  Laterality: N/A;  11 AM START    REPEAT ABDOMINAL PARACENTESIS N/A 9/16/2020    Procedure: PARACENTESIS, ABDOMINAL, REPEAT;  Surgeon: Dosc Diagnostic Provider;  Location: [facility] OR;  Service: Radiology;  Laterality: N/A;  START 2:00 P.M.    REPEAT ABDOMINAL PARACENTESIS N/A 9/28/2020    Procedure: PARACENTESIS, ABDOMINAL, REPEAT;  Surgeon: Dosc Diagnostic Provider;  Location: [facility] OR;  Service: Radiology;  Laterality: N/A;  1 P.M. START    REPEAT ABDOMINAL PARACENTESIS N/A 11/3/2020    Procedure: PARACENTESIS, ABDOMINAL, REPEAT;  Surgeon: Dosc Diagnostic Provider;  Location: [facility] OR;  Service: Radiology;  Laterality: N/A;  11AM START    REPEAT ABDOMINAL PARACENTESIS N/A 11/13/2020    Procedure:  PARACENTESIS, ABDOMINAL, REPEAT;  Surgeon: Dosc Diagnostic Provider;  Location: [facility] OR;  Service: Radiology;  Laterality: N/A;  12PM START    REPEAT ABDOMINAL PARACENTESIS N/A 11/23/2020    Procedure: PARACENTESIS, ABDOMINAL, REPEAT;  Surgeon: Dosc Diagnostic Provider;  [facility];  Service: Radiology;  Laterality: N/A;    REPEAT ABDOMINAL PARACENTESIS N/A 12/1/2020    Procedure: PARACENTESIS, ABDOMINAL, REPEAT;  Surgeon: Dosc Diagnostic Provider;  Location: [facility] OR;  Service: Radiology;  Laterality: N/A;  11AM START    REPEAT ABDOMINAL PARACENTESIS N/A 12/8/2020    Procedure: PARACENTESIS, ABDOMINAL, REPEAT;  Surgeon: Dosc Diagnostic Provider;  Location: [facility] OR;  Service: Radiology;  Laterality: N/A;  93AM START    REPEAT ABDOMINAL PARACENTESIS N/A 12/17/2020    Procedure: PARACENTESIS, ABDOMINAL, REPEAT;  Surgeon: Dosc Diagnostic Provider;  Location: [facility] OR;  Service: Radiology;  Laterality: N/A;  10AM START    REPEAT ABDOMINAL PARACENTESIS N/A 12/30/2020    Procedure: PARACENTESIS, ABDOMINAL, REPEAT;  Surgeon: Dosc Diagnostic Provider;  Location: [facility] OR;  Service: Radiology;  Laterality: N/A;  9 A.M. START    REPEAT ABDOMINAL PARACENTESIS N/A 1/12/2021    Procedure: PARACENTESIS, ABDOMINAL, REPEAT;  Surgeon: Dos Diagnostic Provider;  Location: [facility] OR;  Service: Radiology;  Laterality: N/A;  10 AM START    REPEAT ABDOMINAL PARACENTESIS N/A 2/10/2021    Procedure: PARACENTESIS, ABDOMINAL, REPEAT;  Surgeon: Dosc Diagnostic Provider;  Location: [facility] OR;  Service: Radiology;  Laterality: N/A;  2 P.M. START    REPEAT ABDOMINAL PARACENTESIS N/A 2/18/2021    Procedure: PARACENTESIS, ABDOMINAL, REPEAT;  Surgeon: Dosc Diagnostic Provider;  Location: [facility] OR;  Service: Radiology;  Laterality: N/A;  230PM START    REPEAT ABDOMINAL PARACENTESIS N/A 2/26/2021    Procedure: PARACENTESIS, ABDOMINAL, REPEAT;  Surgeon: Dosc Diagnostic Provider;  [facility];  Service: Radiology;  Laterality: N/A;     REPEAT ABDOMINAL PARACENTESIS N/A 3/4/2021    Procedure: PARACENTESIS, ABDOMINAL, REPEAT;  Surgeon: Dosc Diagnostic Provider;  Location: [facility] OR;  Service: Radiology;  Laterality: N/A;  9AM START    REPEAT ABDOMINAL PARACENTESIS N/A 3/12/2021    Procedure: PARACENTESIS, ABDOMINAL, REPEAT;  Surgeon: Dosc Diagnostic Provider;  Location: [facility] OR;  Service: Radiology;  Laterality: N/A;  10:00 START TIME  NO PRE-OP REQUIRED    REPEAT ABDOMINAL PARACENTESIS N/A 4/7/2021    Procedure: PARACENTESIS, ABDOMINAL, REPEAT;  Surgeon: Dosc Diagnostic Provider;  Location: [facility] OR;  Service: Radiology;  Laterality: N/A;  1 P.M. START    REPEAT ABDOMINAL PARACENTESIS N/A 4/16/2021    Procedure: PARACENTESIS, ABDOMINAL, REPEAT;  Surgeon: Dos Diagnostic Provider;  Location: [facility] OR;  Service: Radiology;  Laterality: N/A;  9:OO START  NO PRE-OP REQ'D    REPEAT ABDOMINAL PARACENTESIS N/A 4/26/2021    Procedure: PARACENTESIS, ABDOMINAL, REPEAT;  Surgeon: Dos Diagnostic Provider;  Location: [facility] OR;  Service: Radiology;  Laterality: N/A;  9AM START    REPEAT ABDOMINAL PARACENTESIS N/A 5/6/2021    Procedure: PARACENTESIS, ABDOMINAL, REPEAT;  Surgeon: Dos Diagnostic Provider;  Location: [facility] OR;  Service: Radiology;  Laterality: N/A;  3PM START    REPEAT ABDOMINAL PARACENTESIS N/A 5/14/2021    Procedure: PARACENTESIS, ABDOMINAL, REPEAT;  Surgeon: Dosc Diagnostic Provider;  Location: [facility] OR;  Service: Radiology;  Laterality: N/A;  1:00PM START  NO PRE-OP REQ'D    REPEAT ABDOMINAL PARACENTESIS N/A 5/24/2021    Procedure: PARACENTESIS, ABDOMINAL, REPEAT;  Surgeon: Dosc Diagnostic Provider;  Location: [facility] OR;  Service: Radiology;  Laterality: N/A;  2 P.M START TIME    REPEAT ABDOMINAL PARACENTESIS N/A 6/4/2021    Procedure: PARACENTESIS, ABDOMINAL, REPEAT;  Surgeon: Dosc Diagnostic Provider;  Location: [facility] OR;  Service: Radiology;  Laterality: N/A;  11AM START    REPEAT ABDOMINAL PARACENTESIS N/A 6/14/2021    Procedure:  PARACENTESIS, ABDOMINAL, REPEAT;  Surgeon: [facility] Diagnostic Provider;  Location: [facility] OR;  Service: Radiology;  Laterality: N/A;  8AM START    REPEAT ABDOMINAL PARACENTESIS N/A 6/23/2021    Procedure: PARACENTESIS, ABDOMINAL, REPEAT;  Surgeon: [facility] Diagnostic Provider;  Location: [facility] OR;  Service: Radiology;  Laterality: N/A;    REVISION OF PROCEDURE INVOLVING GLAUCOMA DRAINAGE DEVICE Left 10/2/2019    EXTRUDING BAERVELDT /WITH PERICARDIAL PATCH GRAFT ()    Right foot surgery  10/2014    TOE AMPUTATION Right     first and second    TOE AMPUTATION Left 11/16/2018    Procedure: AMPUTATION, TOES  2-5;  Surgeon: Mitch Chan MD;  Location: [facility] OR;  Service: Vascular;  Laterality: Left;    TOE AMPUTATION Left 12/5/2018    Procedure: AMPUTATION, TOE;  Surgeon: Mitch Chan MD;  Location: [facility] OR;  Service: Vascular;  Laterality: Left;  left great toe    TONSILLECTOMY       Home Meds:   Prior to Admission medications    Medication Sig Start Date End Date Taking? Authorizing Provider   acetaminophen (TYLENOL) 325 MG tablet Take 650 mg by mouth every 6 (six) hours as needed for Pain.    Historical Provider   allopurinoL (ZYLOPRIM) 300 MG tablet Take 1 tablet (300 mg total) by mouth once daily. 9/28/21   Rubén Davis MD   aspirin (ECOTRIN) 81 MG EC tablet Take 81 mg by mouth once daily.    Historical Provider   blood sugar diagnostic Strp To check BG 2 times daily, to use with insurance preferred meter. e11.65 1/18/22   Sheryl Madison NP   blood-glucose meter kit To check BG 2 times daily, to use with insurance preferred meter 1/18/22 1/18/23  Sheryl Madison NP   brimonidine 0.2% (ALPHAGAN) 0.2 % Drop INSTILL 1 DROP IN BOTH EYES THREE TIMES DAILY 1/9/22   Perla Cortés MD   carvediloL (COREG) 3.125 MG tablet Take 1 tablet (3.125 mg total) by mouth 2 (two) times daily with meals. 2/14/22   Rubén Davis MD   clopidogreL (PLAVIX) 75 mg tablet Take 1 tablet (75 mg total) by mouth  "once daily. 1/14/21 1/14/22  Mitch Chan MD   coenzyme Q10 100 mg capsule Take 100 mg by mouth every morning.    Historical Provider   collagenase (SANTYL) ointment Apply topically to diabetic ulcer on lower right leg once daily. 4/13/21   Rubén Davis MD   dorzolamide-timolol 2-0.5% (COSOPT) 22.3-6.8 mg/mL ophthalmic solution Place 1 drop into both eyes 3 (three) times daily. 12/16/21   Perla Cortés MD   ezetimibe (ZETIA) 10 mg tablet TAKE 1 TABLET(10 MG) BY MOUTH EVERY DAY 12/30/21   Rubén Davis MD   fish oil-omega-3 fatty acids 300-1,000 mg capsule Take 1 capsule by mouth 2 (two) times daily. 1/23/19   Sara Ching MD   gabapentin (NEURONTIN) 100 MG capsule TAKE 2 CAPSULES(200 MG) BY MOUTH EVERY EVENING 10/8/21   Rubén Davis MD   HYDROcodone-acetaminophen (NORCO) 5-325 mg per tablet Take 1 tablet by mouth every 12 (twelve) hours as needed for Pain. 9/21/21   Rubén Davis MD   insulin degludec-liraglutide (XULTOPHY 100/3.6) 100 unit-3.6 mg /mL (3 mL) Pen Inject 35 Units into the skin once daily. 1/18/22   Sheryl Madison NP   lancets Misc To check BG 2 times daily, to use with insurance preferred meter. Dx code e11.65 1/18/22   Sheryl Madison NP   loratadine (CLARITIN) 10 mg tablet Take 10 mg by mouth daily as needed for Allergies.    Historical Provider   metOLazone (ZAROXOLYN) 2.5 MG tablet Take 1 tablet (2.5 mg total) by mouth once daily. 3/8/22   Rubén Davis MD   MULTIVIT,THER IRON,CA,FA & MIN (MULTIVITAMIN) Tab Take 1 tablet by mouth every morning.     Historical Provider   pen needle, diabetic (BD ULTRA-FINE AMADOR PEN NEEDLE) 32 gauge x 5/32" Ndle 1 Device by Misc.(Non-Drug; Combo Route) route 2 (two) times a day. 9/11/20   Sheryl Madison NP   torsemide (DEMADEX) 20 MG Tab TAKE 4 TABLETS(80 MG) BY MOUTH TWICE DAILY 1/2/22   Rubén Davis MD     Anticoagulants/Antiplatelets: no anticoagulation    Allergies:   Review of patient's allergies indicates:   Allergen " Reactions    Statins-hmg-coa reductase inhibitors      Generalized Pain    Onglyza [saxagliptin]     Penicillins Rash     Sedation History:  no adverse reactions     Review of Systems:   Hematological: no known coagulopathies  Respiratory: positive for - orthopnea and shortness of breath  Cardiovascular: positive for - dyspnea on exertion, orthopnea and shortness of breath  Gastrointestinal: positive for - abdominal pain and distension  Genito-Urinary: no dysuria, trouble voiding, or hematuria  Musculoskeletal: negative  Neurological: no TIA or stroke symptoms      OBJECTIVE:     Vital Signs (Most Recent)       Physical Exam:  General: no acute distress  Mental Status: alert and oriented to person, place and time  HEENT: normocephalic, atraumatic  Chest: mild labored breathing  Heart: regular heart rate  Abdomen: +distended with +fluid-wave. No TTP/r/g.  Extremity: moves all extremities    Laboratory  Lab Results   Component Value Date    INR 1.0 08/05/2021       Lab Results   Component Value Date    WBC 4.58 12/14/2021    HGB 14.1 12/14/2021    HCT 44.3 12/14/2021    MCV 96 12/14/2021     12/14/2021      Lab Results   Component Value Date     (H) 12/14/2021     12/14/2021    K 3.5 12/14/2021    CL 98 12/14/2021    CO2 32 (H) 12/14/2021    BUN 59 (H) 12/14/2021    CREATININE 1.3 12/14/2021    CALCIUM 8.6 (L) 12/14/2021    MG 2.1 05/31/2019    ALT 18 12/14/2021    AST 20 12/14/2021    ALBUMIN 2.7 (L) 12/14/2021    BILITOT 0.4 12/14/2021     ASSESSMENT/PLAN:     62 y/o M with CKD III and combined systolic/diastolic CHF complicated by recurrent abdominal distension and pain 2/2 recurrent painful, tense ascites requiring frequent therapeutic large-volume paracentesis.        1. Recurrent painful, tense ascites - Will attempt US-guided percutaneous RLQ-approacchtherapeutic LVP with local anesthetic only and albumin infusion post PRN as indicated per institutional protocol.      Risks (including,  but not limited to, pain, bleeding, infection, damage to nearby structures, failure to obtain sufficient material for a diagnosis, the need for additional procedures, and death), benefits, and alternatives were discussed with the patient. All questions were answered to the best of my abilities. The patient wishes to proceed with the procedure. Written informed consent was obtained.     Thank you for considering IR for the care of your patient.      Zach Cha MD  Interventional Radiology

## 2022-03-10 NOTE — BRIEF OP NOTE
Radiology Post-Procedure Note     Pre Op Diagnosis: Recurrent painful, tense ascites  Post Op Diagnosis: Same     Procedure: 1. US-guided percutaneous RLQ-approach therapeutic LVP     Procedure performed by: Zach Cha MD     Written Informed Consent Obtained: Yes  Specimen Removed: YES, 6,100-L of slightly cloudy, serous fluid  Estimated Blood Loss: none     Findings:   Successful US-guided percutaneous RLQ-approach therapeutic LVP with local anesthetic only and albumin infusion post PRN as indicated per institutional protocol. Patient tolerated the procedure well. No immediate post-procedural complications noted.      Thank you for considering IR for the care of your patient.      Zach Cha MD  Interventional Radiology

## 2022-03-10 NOTE — DISCHARGE SUMMARY
Radiology Discharge Summary      Hospital Course: No complications    Admit Date: 3/10/2022  Discharge Date: 03/10/2022     Instructions Given to Patient: Yes  Diet: Resume prior diet  Activity: activity as tolerated    Description of Condition on Discharge: Stable  Vital Signs (Most Recent): BP: 128/77 (03/10/22 0952)    Discharge Disposition: Home    Discharge Diagnosis:   60 y/o M with h/o recurrent painful, tense ascites s/p successful US-guided percutaneous RLQ-approach therapeutic LVP with local anesthetic only and albumin infusion post as indicated per institutional protocol. Patient tolerated the procedures well. No immediate post-procedural complications noted.      Thank you for considering IR for the care of your patient.      Zach Cha MD  Interventional Radiology

## 2022-03-17 NOTE — H&P
Radiology History & Physical      SUBJECTIVE:     Chief Complaint: CKD 3, CHF, recurrent ascites    History of Present Illness:  Marvin Ray is a 61 y.o. male who presents for paracentesis  Past Medical History:   Diagnosis Date    Arthritis     Cellulitis     CKD (chronic kidney disease), stage III     Coronary artery disease     Diabetes mellitus     Diabetic retinopathy     Diabetic ulcer of left foot     Glaucoma     Gout     Hyperlipemia     Hypertension     ICD (implantable cardioverter-defibrillator) in place 11/02/2018    Left chest    Non-pressure chronic ulcer of other part of left foot with fat layer exposed 10/23/2018    PVD (peripheral vascular disease)     Type 2 diabetes mellitus with left diabetic foot ulcer 10/29/2018    Unsteady gait     uses a wheelchair     Past Surgical History:   Procedure Laterality Date    AHMED GLAUCOMA IMPLANT Left 2011    DONE AT [facility]    AORTOGRAPHY WITH SERIALOGRAPHY Left 4/30/2019    Procedure: AORTOGRAM, WITH SERIALOGRAPHY, LEFT LOWER EXTREMITY, POSSIBLE INTERVENTION;  Surgeon: Mitch Chan MD;  Location: [facility] OR;  Service: Vascular;  Laterality: Left;  RN PRE OP 4-23-19.  NO H & P, No Cosent  [place]    AORTOGRAPHY WITH SERIALOGRAPHY Right 10/6/2020    Procedure: AORTOGRAM, WITH SERIALOGRAPHY, RIGHT LOWER EXTREMITY ANGIOGRAM, POSSIBLE INTERVENTION;  Surgeon: Mitch Chan MD;  Location: [facility] OR;  Service: Vascular;  Laterality: Right;  RN PREOP 10/1/2020--COVID NEGATIVE ON 10/5    AORTOGRAPHY WITH SERIALOGRAPHY Right 1/13/2021    Procedure: AORTOGRAM, WITH RIGHT LOWER EXTREMITY ANGIOGRAM, POSSIBLE INTERVENTION;  Surgeon: Mitch Chan MD;  Location: [facility] OR;  Service: Vascular;  Laterality: Right;  1PM START----NEED CONSENT  RN PREOP 1/8/2021--COVID NEGATIVE ON 1/12    AORTOGRAPHY WITH SERIALOGRAPHY Right 1/26/2021    Procedure: AORTOGRAM, RIGHT LOWER EXTREMITY ANGIOGRAM, POSSIBLE INTERVENTION;  Surgeon: Mitch BAINS  MD Dustin;  Location: [facility] OR;  Service: Vascular;  Laterality: Right;  RN PREOP 1/25/2021, COVID NEGATIVE ON 1/25, cxr, and ecg done    AORTOGRAPHY WITH SERIALOGRAPHY Right 3/30/2021    Procedure: AORTOGRAM, WITH RIGHT LOWER EXTREMITY ANGIOGRAM, POSSIBLE INTERVENTION;  Surgeon: Mitch Chan MD;  Location: [facility] OR;  Service: Vascular;  Laterality: Right;  RN PREOP 3/26/2021  COVID NEGATIVE    BAERVELDT GLAUCOMA IMPLANT Left 2012    WITH CATARACT EXTRACTION//DONE AT [facility]    BELOW KNEE AMPUTATION OF LOWER EXTREMITY Right 5/17/2021    Procedure: AMPUTATION, BELOW KNEE;  Surgeon: Christiana Pritchett MD;  Location: [facility] OR;  Service: Orthopedics;  Laterality: Right;  WOUND VAC -PREVENA  SUPINE---HAS--ICD- Iast interrogation -3/21  AMPUTATION TRAY  1ST CASE OKWHITNEY WHITLOCK  RN PREOP 5/13/2021     COVID --NEGATIVE ON 5/14---INCOMPLETE H/P    BIOPSY Right 12/18/2020    Procedure: Biopsy- trocar biopsy;  Surgeon: Brianna Champion DPM;  Location: [facility] OR;  Service: Podiatry;  Laterality: Right;    CARDIAC CATHETERIZATION Left 05/2016    CARDIAC DEFIBRILLATOR PLACEMENT Left 11/02/2018    CATARACT EXTRACTION W/  INTRAOCULAR LENS IMPLANT Left 2012    WITH BAERVEDT (DONE AT [facility])    CATARACT EXTRACTION W/  INTRAOCULAR LENS IMPLANT Right 09/26/2018    COMPLEX ()    CB DESTRUCTION WITH CYCLO G6 Left 02/15/2017        CYST REMOVAL      DEBRIDEMENT OF FOOT  12/28/2018    Procedure: DEBRIDEMENT, FOOT;  Surgeon: Mitch Chan MD;  Location: [facility] OR;  Service: Vascular;;    DEBRIDEMENT OF FOOT  6/5/2019    Procedure: DEBRIDEMENT, FOOT;  Surgeon: Mitch Chan MD;  Location: [facility] OR;  Service: Vascular;;    DEBRIDEMENT OF FOOT Right 3/5/2021    Procedure: DEBRIDEMENT, FOOT with Misonic device;  Surgeon: Mitch Chan MD;  Location: [facility] OR;  Service: Vascular;  Laterality: Right;    EXAMINATION UNDER ANESTHESIA Left 12/5/2018    Procedure: Exam under anesthesia, left  foot debridement, washout and all other indicated procedures;  Surgeon: Mitch Wall MD;  Location: [facility] OR;  Service: Vascular;  Laterality: Left;  1030AM START  RN PREOP 12/3/2018-----AICD  SEEN BY DR JAMES 12/4    EXAMINATION UNDER ANESTHESIA Left 2/13/2019    Procedure: LEFT FOOT WOUND DEBRIDEMENT, WASHOUT AND ALL OTHER INDICATED PROCEDURES;  Surgeon: Mitch Wall MD;  Location: [facility] OR;  Service: Vascular;  Laterality: Left;  requesting 1st case start  THIS CASE 1ST PER DR WALL ON 2/1/19 @ 844AM -LO  RN PREOP 2/6/2019  HAS CARDIAC CLEARANCE    EXAMINATION UNDER ANESTHESIA Left 12/28/2018    Procedure: Exam under anesthesia, left foot debridement, left foot washout, possible left second through fifth metatarsal resection;  Surgeon: Mitch Wall MD;  Location: [facility] OR;  Service: Vascular;  Laterality: Left;  1:00pm start per julissa @ 8:58am  RN  PHONE PRE OP 12-27-18--=Pt coming from [facility]  NEED CONSENT    EXAMINATION UNDER ANESTHESIA Left 6/5/2019    Procedure: LEFT FOOT DEBRIDEMENT, WASHOUT AND ALL OTHER INDICATED PROCEDURES;  Surgeon: Mitch Wall MD;  Location: [facility] OR;  Service: Vascular;  Laterality: Left;  RN PRE OP 5-31-19------ICD------NEED CONSENT    EXAMINATION UNDER ANESTHESIA Right 11/9/2020    Procedure: RIGHT FOOT DEBRIDEMENT, WASHOUT AND ALL OTHER INDICATED PROCEDURES;  Surgeon: Mitch Wall MD;  Location: [facility] OR;  Service: Vascular;  Laterality: Right;  RN PREOP 10/27/2020, --COVID NEGATIVE ON 11/6, has cardiac clearance/[place] 10/27/2020, pt has ICD  NEEDS ORDERS    EXAMINATION UNDER ANESTHESIA Right 2/4/2021    Procedure: RIGHT FOOT DEBRIDEMENT, POSSIBLE CALCANECTOMY, POSSIBLE FIFTH TOE AMPUTATION, WASHOUT AND ALL OTHER INDICATED PROCEDURES;  Surgeon: Mitch Wall MD;  Location: [facility] OR;  Service: Vascular;  Laterality: Right;  RN PREOP 2/2/2021--COVID NEGATIVE ON 2/2  ICD    EYE SURGERY      CAT EXT OU    FOOT  AMPUTATION THROUGH METATARSAL Right 3/5/2021    Procedure: Right foot wound debridement, possible transmetatarsal amputation, possible fifth toe amputation, washout;  Surgeon: Mitch Chan MD;  Location: [facility] OR;  Service: Vascular;  Laterality: Right;  MISONIX SONIC ONE JAYDEN LOUISE [phone] SPOKE TO HIM ON MY OFFICE @ 7:31AM ON 2-  RN PRE Op, Covid NEGATIVE ON  3-2-21.  C A  8:30AM START----NEED H/P    INCISION AND DRAINAGE Left 11/14/2018    Procedure: Incision and Drainage, left lower extremity debridement, washout;  Surgeon: Mitch Chan MD;  Location: [facility] OR;  Service: Vascular;  Laterality: Left;    INCISION AND DRAINAGE Left 11/16/2018    Procedure: INCISION AND DRAINAGE;  Surgeon: Mitch Chan MD;  Location: [facility] OR;  Service: Vascular;  Laterality: Left;    INCISION AND DRAINAGE Right 11/18/2020    Procedure: Debridement and washout right lower extremity wounds;  Surgeon: Mitch Chan MD;  Location: [facility] OR 2ND FLR;  Service: Vascular;  Laterality: Right;    INCISION AND DRAINAGE Right 1/27/2021    Procedure: RIGHT FOOT DEBRIDEMENT, WASHOUT AND ALL OTHER INDICATED PROCEDURES;  Surgeon: Mitch Chan MD;  Location: [facility] OR 2ND FLR;  Service: Vascular;  Laterality: Right;    INTRAOCULAR PROSTHESES INSERTION Right 9/26/2018    Procedure: INSERTION, IOL PROSTHESIS;  Surgeon: Perla Cortés MD;  Location: [facility] OR 1ST FLR;  Service: Ophthalmology;  Laterality: Right;    PERITONEOCENTESIS N/A 3/1/2019    Procedure: PARACENTESIS, ABDOMINAL, INITIAL;  Surgeon: Dos Diagnostic Provider;  Location: [facility] OR;  Service: Radiology;  Laterality: N/A;    PERITONEOCENTESIS N/A 3/25/2019    Procedure: PARACENTESIS, ABDOMINAL, INITIAL;  Surgeon: Dos Diagnostic Provider;  Location: [facility] OR;  Service: Radiology;  Laterality: N/A;    PERITONEOCENTESIS N/A 7/22/2019    Procedure: PARACENTESIS, ABDOMINAL, INITIAL;  Surgeon: Dos Diagnostic Provider;  Location: [facility]  OR;  Service: Radiology;  Laterality: N/A;    PERITONEOCENTESIS N/A 8/16/2019    Procedure: PARACENTESIS, ABDOMINAL, INITIAL;  Surgeon: Dosc Diagnostic Provider;  Location: [facility] OR;  Service: Radiology;  Laterality: N/A;    PERITONEOCENTESIS N/A 9/26/2019    Procedure: PARACENTESIS, ABDOMINAL;  Surgeon: Dosc Diagnostic Provider;  Location: [facility] OR;  Service: Radiology;  Laterality: N/A;    PERITONEOCENTESIS N/A 10/11/2019    Procedure: PARACENTESIS, ABDOMINAL;  Surgeon: Dosc Diagnostic Provider;  Location: [facility] OR;  Service: Radiology;  Laterality: N/A;    PERITONEOCENTESIS N/A 10/31/2019    Procedure: PARACENTESIS, ABDOMINAL;  Surgeon: Dosc Diagnostic Provider;  Location: [facility] OR;  Service: Radiology;  Laterality: N/A;    PERITONEOCENTESIS N/A 11/18/2019    Procedure: PARACENTESIS, ABDOMINAL;  Surgeon: Dosc Diagnostic Provider;  Location: [facility] OR;  Service: Radiology;  Laterality: N/A;    PERITONEOCENTESIS N/A 12/16/2019    Procedure: PARACENTESIS, ABDOMINAL;  Surgeon: Dosc Diagnostic Provider;  Location: [facility] OR;  Service: Radiology;  Laterality: N/A;  2PM START    PERITONEOCENTESIS N/A 2/7/2020    Procedure: PARACENTESIS, ABDOMINAL;  Surgeon: Dosc Diagnostic Provider;  Location: [facility] OR;  Service: Radiology;  Laterality: N/A;  REQUESTED 10:30A START    PERITONEOCENTESIS N/A 2/19/2020    Procedure: PARACENTESIS, ABDOMINAL;  Surgeon: Dosc Diagnostic Provider;  Location: [facility] OR;  Service: Radiology;  Laterality: N/A;    PERITONEOCENTESIS N/A 2/28/2020    Procedure: PARACENTESIS, ABDOMINAL;  Surgeon: Dosc Diagnostic Provider;  Location: [facility] OR;  Service: Radiology;  Laterality: N/A;  REQUESTED 10AM START    PHACOEMULSIFICATION OF CATARACT Right 9/26/2018    Procedure: PHACOEMULSIFICATION, CATARACT;  Surgeon: Perla Cortés MD;  Location: [facility] OR [address] [place] [address];  Service: Ophthalmology;  Laterality: Right;    REPEAT ABDOMINAL PARACENTESIS N/A 2/11/2019    Procedure: PARACENTESIS, ABDOMINAL, REPEAT;   Surgeon: Dosc Diagnostic Provider;  Location: [facility] OR;  Service: Radiology;  Laterality: N/A;  1PM START    REPEAT ABDOMINAL PARACENTESIS N/A 9/12/2019    Procedure: PARACENTESIS, ABDOMINAL, REPEAT;  Surgeon: Dosc Diagnostic Provider;  Location: [facility] OR;  Service: Radiology;  Laterality: N/A;  9AM START    REPEAT ABDOMINAL PARACENTESIS N/A 12/2/2019    Procedure: PARACENTESIS, ABDOMINAL, REPEAT;  Surgeon: Dosc Diagnostic Provider;  Location: [facility] OR;  Service: Radiology;  Laterality: N/A;  3PM START    REPEAT ABDOMINAL PARACENTESIS N/A 1/10/2020    Procedure: PARACENTESIS, ABDOMINAL, REPEAT;  Surgeon: Dosc Diagnostic Provider;  Location: [facility] OR;  Service: Radiology;  Laterality: N/A;  1130AM START    REPEAT ABDOMINAL PARACENTESIS N/A 1/20/2020    Procedure: PARACENTESIS, ABDOMINAL, REPEAT;  Surgeon: Dosc Diagnostic Provider;  Location: [facility] OR;  Service: Radiology;  Laterality: N/A;  1PM START    REPEAT ABDOMINAL PARACENTESIS N/A 1/31/2020    Procedure: PARACENTESIS, ABDOMINAL, REPEAT;  Surgeon: Dos Diagnostic Provider;  Location: [facility] OR;  Service: Radiology;  Laterality: N/A;  10AM START    REPEAT ABDOMINAL PARACENTESIS N/A 3/16/2020    Procedure: PARACENTESIS, ABDOMINAL, REPEAT;  Surgeon: Dos Diagnostic Provider;  Location: [facility] OR;  Service: Radiology;  Laterality: N/A;  10AM START    REPEAT ABDOMINAL PARACENTESIS N/A 3/30/2020    Procedure: PARACENTESIS, ABDOMINAL, REPEAT;  Surgeon: Dosc Diagnostic Provider;  Location: [facility] OR;  Service: Radiology;  Laterality: N/A;    REPEAT ABDOMINAL PARACENTESIS N/A 4/9/2020    Procedure: PARACENTESIS, ABDOMINAL, REPEAT;  Surgeon: Dosc Diagnostic Provider;  Location: [facility] OR;  Service: Radiology;  Laterality: N/A;  1130AM START    REPEAT ABDOMINAL PARACENTESIS N/A 5/8/2020    Procedure: PARACENTESIS, ABDOMINAL, REPEAT;  Surgeon: Dosc Diagnostic Provider;  Location: [facility] OR;  Service: Radiology;  Laterality: N/A;  9AM START    REPEAT ABDOMINAL PARACENTESIS N/A  5/21/2020    Procedure: PARACENTESIS, ABDOMINAL, REPEAT;  Surgeon: Dosc Diagnostic Provider;  Location: [facility] OR;  Service: Radiology;  Laterality: N/A;  10AM START    REPEAT ABDOMINAL PARACENTESIS N/A 6/5/2020    Procedure: PARACENTESIS, ABDOMINAL, REPEAT;  Surgeon: Dosc Diagnostic Provider;  Location: [facility] OR;  Service: Radiology;  Laterality: N/A;  NOON START    REPEAT ABDOMINAL PARACENTESIS N/A 7/10/2020    Procedure: PARACENTESIS, ABDOMINAL, REPEAT;  Surgeon: Dosc Diagnostic Provider;  Location: [facility] OR;  Service: Radiology;  Laterality: N/A;  1PM START    REPEAT ABDOMINAL PARACENTESIS N/A 8/20/2020    Procedure: PARACENTESIS, ABDOMINAL, REPEAT;  Surgeon: Dosc Diagnostic Provider;  Location: [facility] OR;  Service: Radiology;  Laterality: N/A;  1PM START    REPEAT ABDOMINAL PARACENTESIS N/A 9/4/2020    Procedure: PARACENTESIS, ABDOMINAL, REPEAT;  Surgeon: Dosc Diagnostic Provider;  Location: [facility] OR;  Service: Radiology;  Laterality: N/A;  11 AM START    REPEAT ABDOMINAL PARACENTESIS N/A 9/16/2020    Procedure: PARACENTESIS, ABDOMINAL, REPEAT;  Surgeon: Dosc Diagnostic Provider;  Location: [facility] OR;  Service: Radiology;  Laterality: N/A;  START 2:00 P.M.    REPEAT ABDOMINAL PARACENTESIS N/A 9/28/2020    Procedure: PARACENTESIS, ABDOMINAL, REPEAT;  Surgeon: Dosc Diagnostic Provider;  Location: [facility] OR;  Service: Radiology;  Laterality: N/A;  1 P.M. START    REPEAT ABDOMINAL PARACENTESIS N/A 11/3/2020    Procedure: PARACENTESIS, ABDOMINAL, REPEAT;  Surgeon: Dosc Diagnostic Provider;  Location: [facility] OR;  Service: Radiology;  Laterality: N/A;  11AM START    REPEAT ABDOMINAL PARACENTESIS N/A 11/13/2020    Procedure: PARACENTESIS, ABDOMINAL, REPEAT;  Surgeon: Dosc Diagnostic Provider;  Location: [facility] OR;  Service: Radiology;  Laterality: N/A;  12PM START    REPEAT ABDOMINAL PARACENTESIS N/A 11/23/2020    Procedure: PARACENTESIS, ABDOMINAL, REPEAT;  Surgeon: Dosc Diagnostic Provider;  Location: [facility] OR;  Service:  Radiology;  Laterality: N/A;    REPEAT ABDOMINAL PARACENTESIS N/A 12/1/2020    Procedure: PARACENTESIS, ABDOMINAL, REPEAT;  Surgeon: Dosc Diagnostic Provider;  Location: [facility] OR;  Service: Radiology;  Laterality: N/A;  11AM START    REPEAT ABDOMINAL PARACENTESIS N/A 12/8/2020    Procedure: PARACENTESIS, ABDOMINAL, REPEAT;  Surgeon: Dosc Diagnostic Provider;  Location: [facility] OR;  Service: Radiology;  Laterality: N/A;  93AM START    REPEAT ABDOMINAL PARACENTESIS N/A 12/17/2020    Procedure: PARACENTESIS, ABDOMINAL, REPEAT;  Surgeon: Dosc Diagnostic Provider;  Location: [facility] OR;  Service: Radiology;  Laterality: N/A;  10AM START    REPEAT ABDOMINAL PARACENTESIS N/A 12/30/2020    Procedure: PARACENTESIS, ABDOMINAL, REPEAT;  Surgeon: Dosc Diagnostic Provider;  Location: [facility] OR;  Service: Radiology;  Laterality: N/A;  9 A.M. START    REPEAT ABDOMINAL PARACENTESIS N/A 1/12/2021    Procedure: PARACENTESIS, ABDOMINAL, REPEAT;  Surgeon: Dos Diagnostic Provider;  Location: [facility] OR;  Service: Radiology;  Laterality: N/A;  10 AM START    REPEAT ABDOMINAL PARACENTESIS N/A 2/10/2021    Procedure: PARACENTESIS, ABDOMINAL, REPEAT;  Surgeon: Dosc Diagnostic Provider;  Location: [facility] OR;  Service: Radiology;  Laterality: N/A;  2 P.M. START    REPEAT ABDOMINAL PARACENTESIS N/A 2/18/2021    Procedure: PARACENTESIS, ABDOMINAL, REPEAT;  Surgeon: Dosc Diagnostic Provider;  Location: [facility] OR;  Service: Radiology;  Laterality: N/A;  230PM START    REPEAT ABDOMINAL PARACENTESIS N/A 2/26/2021    Procedure: PARACENTESIS, ABDOMINAL, REPEAT;  Surgeon: Dosc Diagnostic Provider;  Location: [facility] OR;  Service: Radiology;  Laterality: N/A;    REPEAT ABDOMINAL PARACENTESIS N/A 3/4/2021    Procedure: PARACENTESIS, ABDOMINAL, REPEAT;  Surgeon: Dosc Diagnostic Provider;  Location: [facility] OR;  Service: Radiology;  Laterality: N/A;  9AM START    REPEAT ABDOMINAL PARACENTESIS N/A 3/12/2021    Procedure: PARACENTESIS, ABDOMINAL, REPEAT;  Surgeon:  Dosc Diagnostic Provider;  Location: [facility] OR;  Service: Radiology;  Laterality: N/A;  10:00 START TIME  NO PRE-OP REQUIRED    REPEAT ABDOMINAL PARACENTESIS N/A 4/7/2021    Procedure: PARACENTESIS, ABDOMINAL, REPEAT;  Surgeon: Dosc Diagnostic Provider;  Location: [facility] OR;  Service: Radiology;  Laterality: N/A;  1 P.M. START    REPEAT ABDOMINAL PARACENTESIS N/A 4/16/2021    Procedure: PARACENTESIS, ABDOMINAL, REPEAT;  Surgeon: Dosc Diagnostic Provider;  Location: [facility] OR;  Service: Radiology;  Laterality: N/A;  9:OO START  NO PRE-OP REQ'D    REPEAT ABDOMINAL PARACENTESIS N/A 4/26/2021    Procedure: PARACENTESIS, ABDOMINAL, REPEAT;  Surgeon: Dosc Diagnostic Provider;  Location: [facility] OR;  Service: Radiology;  Laterality: N/A;  9AM START    REPEAT ABDOMINAL PARACENTESIS N/A 5/6/2021    Procedure: PARACENTESIS, ABDOMINAL, REPEAT;  Surgeon: Dosc Diagnostic Provider;  Location: [facility] OR;  Service: Radiology;  Laterality: N/A;  3PM START    REPEAT ABDOMINAL PARACENTESIS N/A 5/14/2021    Procedure: PARACENTESIS, ABDOMINAL, REPEAT;  Surgeon: Dosc Diagnostic Provider;  Location: [facility] OR;  Service: Radiology;  Laterality: N/A;  1:00PM START  NO PRE-OP REQ'D    REPEAT ABDOMINAL PARACENTESIS N/A 5/24/2021    Procedure: PARACENTESIS, ABDOMINAL, REPEAT;  Surgeon: Dosc Diagnostic Provider;  Location: [facility] OR;  Service: Radiology;  Laterality: N/A;  2 P.M START TIME    REPEAT ABDOMINAL PARACENTESIS N/A 6/4/2021    Procedure: PARACENTESIS, ABDOMINAL, REPEAT;  Surgeon: Dosc Diagnostic Provider;  Location: [facility] OR;  Service: Radiology;  Laterality: N/A;  11AM START    REPEAT ABDOMINAL PARACENTESIS N/A 6/14/2021    Procedure: PARACENTESIS, ABDOMINAL, REPEAT;  Surgeon: Dosc Diagnostic Provider;  Location: [facility] OR;  Service: Radiology;  Laterality: N/A;  8AM START    REPEAT ABDOMINAL PARACENTESIS N/A 6/23/2021    Procedure: PARACENTESIS, ABDOMINAL, REPEAT;  Surgeon: Dosc Diagnostic Provider;  Location: [facility] OR;  Service: Radiology;   Laterality: N/A;    REVISION OF PROCEDURE INVOLVING GLAUCOMA DRAINAGE DEVICE Left 10/2/2019    EXTRUDING BAERVELDT /WITH PERICARDIAL PATCH GRAFT ()    Right foot surgery  10/2014    TOE AMPUTATION Right     first and second    TOE AMPUTATION Left 11/16/2018    Procedure: AMPUTATION, TOES  2-5;  Surgeon: Mitch Chan MD;  Location: [facility] OR;  Service: Vascular;  Laterality: Left;    TOE AMPUTATION Left 12/5/2018    Procedure: AMPUTATION, TOE;  Surgeon: Mitch Chan MD;  Location: [facility] OR;  Service: Vascular;  Laterality: Left;  left great toe    TONSILLECTOMY         Home Meds:   Prior to Admission medications    Medication Sig Start Date End Date Taking? Authorizing Provider   acetaminophen (TYLENOL) 325 MG tablet Take 650 mg by mouth every 6 (six) hours as needed for Pain.    Historical Provider   allopurinoL (ZYLOPRIM) 300 MG tablet Take 1 tablet (300 mg total) by mouth once daily. 9/28/21   Rubén Davis MD   aspirin (ECOTRIN) 81 MG EC tablet Take 81 mg by mouth once daily.    Historical Provider   blood sugar diagnostic Strp To check BG 2 times daily, to use with insurance preferred meter. e11.65 1/18/22   Sheryl Madison NP   blood-glucose meter kit To check BG 2 times daily, to use with insurance preferred meter 1/18/22 1/18/23  Sheryl Madison NP   brimonidine 0.2% (ALPHAGAN) 0.2 % Drop INSTILL 1 DROP IN BOTH EYES THREE TIMES DAILY 1/9/22   Perla Cortés MD   carvediloL (COREG) 3.125 MG tablet Take 1 tablet (3.125 mg total) by mouth 2 (two) times daily with meals. 2/14/22   Rubén Davis MD   clopidogreL (PLAVIX) 75 mg tablet Take 1 tablet (75 mg total) by mouth once daily. 1/14/21 1/14/22  Mitch Chan MD   coenzyme Q10 100 mg capsule Take 100 mg by mouth every morning.    Historical Provider   collagenase (SANTYL) ointment Apply topically to diabetic ulcer on lower right leg once daily. 4/13/21   Rubén Davis MD   dorzolamide-timolol 2-0.5% (COSOPT)  "22.3-6.8 mg/mL ophthalmic solution Place 1 drop into both eyes 3 (three) times daily. 12/16/21   Perla Cortés MD   ezetimibe (ZETIA) 10 mg tablet TAKE 1 TABLET(10 MG) BY MOUTH EVERY DAY 12/30/21   Rubén Davis MD   fish oil-omega-3 fatty acids 300-1,000 mg capsule Take 1 capsule by mouth 2 (two) times daily. 1/23/19   Sara Ching MD   gabapentin (NEURONTIN) 100 MG capsule TAKE 2 CAPSULES(200 MG) BY MOUTH EVERY EVENING 10/8/21   Rubén Davis MD   HYDROcodone-acetaminophen (NORCO) 5-325 mg per tablet Take 1 tablet by mouth every 12 (twelve) hours as needed for Pain. 9/21/21   Rubén Davis MD   insulin degludec-liraglutide (XULTOPHY 100/3.6) 100 unit-3.6 mg /mL (3 mL) Pen Inject 35 Units into the skin once daily. 1/18/22   Sheryl Madison NP   lancets Misc To check BG 2 times daily, to use with insurance preferred meter. Dx code e11.65 1/18/22   Sheryl Madison NP   loratadine (CLARITIN) 10 mg tablet Take 10 mg by mouth daily as needed for Allergies.    Historical Provider   metOLazone (ZAROXOLYN) 2.5 MG tablet Take 1 tablet (2.5 mg total) by mouth once daily. 3/8/22   Rubén Davis MD   MULTIVIT,THER IRON,CA,FA & MIN (MULTIVITAMIN) Tab Take 1 tablet by mouth every morning.     Historical Provider   pen needle, diabetic (BD ULTRA-FINE AMADOR PEN NEEDLE) 32 gauge x 5/32" Ndle 1 Device by Misc.(Non-Drug; Combo Route) route 2 (two) times a day. 9/11/20   Sheryl Madison NP   torsemide (DEMADEX) 20 MG Tab TAKE 4 TABLETS(80 MG) BY MOUTH TWICE DAILY 1/2/22   Rubén Davis MD     Anticoagulants/Antiplatelets: aspirin, plavix    Allergies:   Review of patient's allergies indicates:   Allergen Reactions    Statins-hmg-coa reductase inhibitors      Generalized Pain    Onglyza [saxagliptin]     Penicillins Rash     Sedation History:  no adverse reactions    Review of Systems:   Hematological: no known coagulopathies  Respiratory: no shortness of breath  Cardiovascular: no chest pain  Gastrointestinal: no " abdominal pain  Genito-Urinary: no dysuria  Musculoskeletal: negative  Neurological: no TIA or stroke symptoms         OBJECTIVE:     Vital Signs (Most Recent)  BP: (!) 106/55 (03/17/22 0933)    Physical Exam:  ASA: 2  Mallampati: N/A    General: no acute distress  Mental Status: alert and oriented to person, place and time  HEENT: normocephalic, atraumatic  Chest: unlabored breathing  Heart: regular heart rate  Abdomen: nondistended  Extremity: moves all extremities    Laboratory  Lab Results   Component Value Date    INR 1.0 08/05/2021       Lab Results   Component Value Date    WBC 4.58 12/14/2021    HGB 14.1 12/14/2021    HCT 44.3 12/14/2021    MCV 96 12/14/2021     12/14/2021      Lab Results   Component Value Date     (H) 12/14/2021     12/14/2021    K 3.5 12/14/2021    CL 98 12/14/2021    CO2 32 (H) 12/14/2021    BUN 59 (H) 12/14/2021    CREATININE 1.3 12/14/2021    CALCIUM 8.6 (L) 12/14/2021    MG 2.1 05/31/2019    ALT 18 12/14/2021    AST 20 12/14/2021    ALBUMIN 2.7 (L) 12/14/2021    BILITOT 0.4 12/14/2021       ASSESSMENT/PLAN:     Sedation Plan: local anesthesia  Patient will undergo paracentesis.    Vin Kong MD  Staff Radiologist  Department of Radiology  Pager: [phone]

## 2022-03-17 NOTE — DISCHARGE SUMMARY
Radiology Discharge Summary      Hospital Course: No complications    Admit Date: 3/17/2022  Discharge Date: 03/17/2022     Instructions Given to Patient: Yes  Diet: Resume prior diet  Activity: activity as tolerated    Description of Condition on Discharge: Stable  Vital Signs (Most Recent): BP: (!) 108/58 (03/17/22 1026)    Discharge Disposition: Home    Discharge Diagnosis: CKD, CHF s/p paracentesis     Follow-up: continue regular paracentesis    Vin Kong MD  Staff Radiologist  Department of Radiology  Pager: [phone]

## 2022-03-17 NOTE — PROCEDURES
Radiology Post-Procedure Note    Pre Op Diagnosis: Ascites, CKD 3, CHF  Post Op Diagnosis: Same    Procedure: Paracentesis    Procedure performed by: Vin Kong MD    Written Informed Consent Obtained: Yes  Specimen Removed: YES slightly cloudy, thin yellow fluid  Estimated Blood Loss: Minimal    Findings:   Successful paracentesis.  Albumin administered PRN per protocol.    Patient tolerated procedure well.    Vin Kong MD  Staff Radiologist  Department of Radiology  Pager: [phone]

## 2022-03-24 NOTE — H&P
[facility] - Interventional Radiology  History & Physical - Short Stay  Interventional Radiology    SUBJECTIVE:     Chief Complaint/Reason for Admission: Recurrent ascites    Informant(s):  self and Electronic Health Record    History of Present Illness:  Marvin Ray is a 61 y.o. male with a history of ascites.    Patient presents for paracentesis.    Scheduled Meds:   Continuous Infusions:   PRN Meds:     Review of patient's allergies indicates:   Allergen Reactions    Statins-hmg-coa reductase inhibitors      Generalized Pain    Onglyza [saxagliptin]     Penicillins Rash       Past Medical History:   Diagnosis Date    Arthritis     Cellulitis     CKD (chronic kidney disease), stage III     Coronary artery disease     Diabetes mellitus     Diabetic retinopathy     Diabetic ulcer of left foot     Glaucoma     Gout     Hyperlipemia     Hypertension     ICD (implantable cardioverter-defibrillator) in place 11/02/2018    Left chest    Non-pressure chronic ulcer of other part of left foot with fat layer exposed 10/23/2018    PVD (peripheral vascular disease)     Type 2 diabetes mellitus with left diabetic foot ulcer 10/29/2018    Unsteady gait     uses a wheelchair     Past Surgical History:   Procedure Laterality Date    AHMED GLAUCOMA IMPLANT Left 2011    DONE AT [facility]    AORTOGRAPHY WITH SERIALOGRAPHY Left 4/30/2019    Procedure: AORTOGRAM, WITH SERIALOGRAPHY, LEFT LOWER EXTREMITY, POSSIBLE INTERVENTION;  Surgeon: Mitch Chan MD;  Location: Health [facility] OR;  Service: Vascular;  Laterality: Left;  RN PRE OP 4-23-19.  NO H & P, No Cosent  [place]    AORTOGRAPHY WITH SERIALOGRAPHY Right 10/6/2020    Procedure: AORTOGRAM, WITH SERIALOGRAPHY, RIGHT LOWER EXTREMITY ANGIOGRAM, POSSIBLE INTERVENTION;  Surgeon: Mitch Chan MD;  Location: Health system OR;  Service: Vascular;  Laterality: Right;  RN PREOP 10/1/2020--COVID NEGATIVE ON 10/5    AORTOGRAPHY WITH SERIALOGRAPHY Right 1/13/2021     Procedure: AORTOGRAM, WITH RIGHT LOWER EXTREMITY ANGIOGRAM, POSSIBLE INTERVENTION;  Surgeon: Mitch Chan MD;  Location: [facility] OR;  Service: Vascular;  Laterality: Right;  1PM START----NEED CONSENT  RN PREOP 1/8/2021--COVID NEGATIVE ON 1/12    AORTOGRAPHY WITH SERIALOGRAPHY Right 1/26/2021    Procedure: AORTOGRAM, RIGHT LOWER EXTREMITY ANGIOGRAM, POSSIBLE INTERVENTION;  Surgeon: Mitch Chan MD;  Location: [facility] OR;  Service: Vascular;  Laterality: Right;  RN PREOP 1/25/2021, COVID NEGATIVE ON 1/25, cxr, and ecg done    AORTOGRAPHY WITH SERIALOGRAPHY Right 3/30/2021    Procedure: AORTOGRAM, WITH RIGHT LOWER EXTREMITY ANGIOGRAM, POSSIBLE INTERVENTION;  Surgeon: Mitch Chan MD;  Location: [facility] OR;  Service: Vascular;  Laterality: Right;  RN PREOP 3/26/2021  COVID NEGATIVE    BAERVELDT GLAUCOMA IMPLANT Left 2012    WITH CATARACT EXTRACTION//DONE AT [facility]    BELOW KNEE AMPUTATION OF LOWER EXTREMITY Right 5/17/2021    Procedure: AMPUTATION, BELOW KNEE;  Surgeon: Christiana Pritchett MD;  Location: [facility] OR;  Service: Orthopedics;  Laterality: Right;  WOUND VAC -PREVENA  SUPINE---HAS--ICD- Iast interrogation -3/21  AMPUTATION TRAY  1ST CASE FLORA WHITLOCK  RN PREOP 5/13/2021     COVID --NEGATIVE ON 5/14---INCOMPLETE H/P    BIOPSY Right 12/18/2020    Procedure: Biopsy- trocar biopsy;  Surgeon: Brianna Champion DPM;  Location: [facility] OR;  Service: Podiatry;  Laterality: Right;    CARDIAC CATHETERIZATION Left 05/2016    CARDIAC DEFIBRILLATOR PLACEMENT Left 11/02/2018    CATARACT EXTRACTION W/  INTRAOCULAR LENS IMPLANT Left 2012    WITH BAERVEDT (DONE AT [facility])    CATARACT EXTRACTION W/  INTRAOCULAR LENS IMPLANT Right 09/26/2018    COMPLEX ()    CB DESTRUCTION WITH CYCLO G6 Left 02/15/2017        CYST REMOVAL      DEBRIDEMENT OF FOOT  12/28/2018    Procedure: DEBRIDEMENT, FOOT;  Surgeon: Mitch Chan MD;  Location: [facility] OR;  Service: Vascular;;    DEBRIDEMENT OF  FOOT  6/5/2019    Procedure: DEBRIDEMENT, FOOT;  Surgeon: Mitch Wall MD;  Location: [facility] OR;  Service: Vascular;;    DEBRIDEMENT OF FOOT Right 3/5/2021    Procedure: DEBRIDEMENT, FOOT with Misonic device;  Surgeon: Mitch Wall MD;  Location: [facility] OR;  Service: Vascular;  Laterality: Right;    EXAMINATION UNDER ANESTHESIA Left 12/5/2018    Procedure: Exam under anesthesia, left foot debridement, washout and all other indicated procedures;  Surgeon: Mitch Wall MD;  Location: [facility] OR;  Service: Vascular;  Laterality: Left;  1030AM START  RN PREOP 12/3/2018-----AICD  SEEN BY DR JAMES 12/4    EXAMINATION UNDER ANESTHESIA Left 2/13/2019    Procedure: LEFT FOOT WOUND DEBRIDEMENT, WASHOUT AND ALL OTHER INDICATED PROCEDURES;  Surgeon: Mitch Wall MD;  Location: [facility] OR;  Service: Vascular;  Laterality: Left;  requesting 1st case start  THIS CASE 1ST PER DR WALL ON 2/1/19 @ 844AM -  RN PREOP 2/6/2019  HAS CARDIAC CLEARANCE    EXAMINATION UNDER ANESTHESIA Left 12/28/2018    Procedure: Exam under anesthesia, left foot debridement, left foot washout, possible left second through fifth metatarsal resection;  Surgeon: Mitch Wall MD;  Location: [facility] OR;  Service: Vascular;  Laterality: Left;  1:00pm start per julissa @ 8:58am  RN  PHONE PRE OP 12-27-18--=Pt coming from [facility]  NEED CONSENT    EXAMINATION UNDER ANESTHESIA Left 6/5/2019    Procedure: LEFT FOOT DEBRIDEMENT, WASHOUT AND ALL OTHER INDICATED PROCEDURES;  Surgeon: Mitch Wall MD;  Location: [facility] OR;  Service: Vascular;  Laterality: Left;  RN PRE OP 5-31-19------ICD------NEED CONSENT    EXAMINATION UNDER ANESTHESIA Right 11/9/2020    Procedure: RIGHT FOOT DEBRIDEMENT, WASHOUT AND ALL OTHER INDICATED PROCEDURES;  Surgeon: Mitch Wall MD;  Location: [facility] OR;  Service: Vascular;  Laterality: Right;  RN PREOP 10/27/2020, --COVID NEGATIVE ON 11/6, has cardiac clearance/[place] 10/27/2020,  pt has ICD  NEEDS ORDERS    EXAMINATION UNDER ANESTHESIA Right 2/4/2021    Procedure: RIGHT FOOT DEBRIDEMENT, POSSIBLE CALCANECTOMY, POSSIBLE FIFTH TOE AMPUTATION, WASHOUT AND ALL OTHER INDICATED PROCEDURES;  Surgeon: Mitch Chan MD;  Location: [facility] OR;  Service: Vascular;  Laterality: Right;  RN PREOP 2/2/2021--COVID NEGATIVE ON 2/2  ICD    EYE SURGERY      CAT EXT OU    FOOT AMPUTATION THROUGH METATARSAL Right 3/5/2021    Procedure: Right foot wound debridement, possible transmetatarsal amputation, possible fifth toe amputation, washout;  Surgeon: Mitch Chan MD;  Location: [facility] OR;  Service: Vascular;  Laterality: Right;  MISONIX SONIC ONE JAYDEN LOUISE [phone] SPOKE TO HIM ON MY OFFICE @ 7:31AM ON 2-  RN PRE Op, Covid NEGATIVE ON  3-2-21.  C A  8:30AM START----NEED H/P    INCISION AND DRAINAGE Left 11/14/2018    Procedure: Incision and Drainage, left lower extremity debridement, washout;  Surgeon: Mitch Chan MD;  Location: [facility] OR;  Service: Vascular;  Laterality: Left;    INCISION AND DRAINAGE Left 11/16/2018    Procedure: INCISION AND DRAINAGE;  Surgeon: Mitch Chan MD;  Location: [facility] OR;  Service: Vascular;  Laterality: Left;    INCISION AND DRAINAGE Right 11/18/2020    Procedure: Debridement and washout right lower extremity wounds;  Surgeon: Mitch Chan MD;  Location: [facility] OR 2ND FLR;  Service: Vascular;  Laterality: Right;    INCISION AND DRAINAGE Right 1/27/2021    Procedure: RIGHT FOOT DEBRIDEMENT, WASHOUT AND ALL OTHER INDICATED PROCEDURES;  Surgeon: Mitch Chan MD;  Location: [facility] OR 2ND FLR;  Service: Vascular;  Laterality: Right;    INTRAOCULAR PROSTHESES INSERTION Right 9/26/2018    Procedure: INSERTION, IOL PROSTHESIS;  Surgeon: Perla Cortés MD;  Location: [facility] OR 1ST FLR;  Service: Ophthalmology;  Laterality: Right;    PERITONEOCENTESIS N/A 3/1/2019    Procedure: PARACENTESIS, ABDOMINAL, INITIAL;  Surgeon: Winston  Diagnostic Provider;  Location: [facility] OR;  Service: Radiology;  Laterality: N/A;    PERITONEOCENTESIS N/A 3/25/2019    Procedure: PARACENTESIS, ABDOMINAL, INITIAL;  Surgeon: Dosc Diagnostic Provider;  Location: [facility] OR;  Service: Radiology;  Laterality: N/A;    PERITONEOCENTESIS N/A 7/22/2019    Procedure: PARACENTESIS, ABDOMINAL, INITIAL;  Surgeon: Dosc Diagnostic Provider;  Location: [facility] OR;  Service: Radiology;  Laterality: N/A;    PERITONEOCENTESIS N/A 8/16/2019    Procedure: PARACENTESIS, ABDOMINAL, INITIAL;  Surgeon: Dosc Diagnostic Provider;  [facility] OR;  Service: Radiology;  Laterality: N/A;    PERITONEOCENTESIS N/A 9/26/2019    Procedure: PARACENTESIS, ABDOMINAL;  Surgeon: Dosc Diagnostic Provider;  Location: [facility] OR;  Service: Radiology;  Laterality: N/A;    PERITONEOCENTESIS N/A 10/11/2019    Procedure: PARACENTESIS, ABDOMINAL;  Surgeon: Dosc Diagnostic Provider;  Location: [facility] OR;  Service: Radiology;  Laterality: N/A;    PERITONEOCENTESIS N/A 10/31/2019    Procedure: PARACENTESIS, ABDOMINAL;  Surgeon: Dosc Diagnostic Provider;  Location: [facility] OR;  Service: Radiology;  Laterality: N/A;    PERITONEOCENTESIS N/A 11/18/2019    Procedure: PARACENTESIS, ABDOMINAL;  Surgeon: Dosc Diagnostic Provider;  Location: [facility] OR;  Service: Radiology;  Laterality: N/A;    PERITONEOCENTESIS N/A 12/16/2019    Procedure: PARACENTESIS, ABDOMINAL;  Surgeon: Dosc Diagnostic Provider;  Location: [facility] OR;  Service: Radiology;  Laterality: N/A;  2PM START    PERITONEOCENTESIS N/A 2/7/2020    Procedure: PARACENTESIS, ABDOMINAL;  Surgeon: Dosc Diagnostic Provider;  [facility];  Service: Radiology;  Laterality: N/A;  REQUESTED 10:30A START    PERITONEOCENTESIS N/A 2/19/2020    Procedure: PARACENTESIS, ABDOMINAL;  Surgeon: Dosc Diagnostic Provider;  [facility]: [facility];  Service: Radiology;  Laterality: N/A;    PERITONEOCENTESIS N/A 2/28/2020    Procedure: PARACENTESIS, ABDOMINAL;  Surgeon: Dosc Diagnostic  Provider;  Location: [facility] OR;  Service: Radiology;  Laterality: N/A;  REQUESTED 10AM START    PHACOEMULSIFICATION OF CATARACT Right 9/26/2018    Procedure: PHACOEMULSIFICATION, CATARACT;  Surgeon: Perla Cortés MD;  Location: [facility] OR [address] [place] [address];  Service: Ophthalmology;  Laterality: Right;    REPEAT ABDOMINAL PARACENTESIS N/A 2/11/2019    Procedure: PARACENTESIS, ABDOMINAL, REPEAT;  Surgeon: Dos Diagnostic Provider;  Location: [facility] OR;  Service: Radiology;  Laterality: N/A;  1PM START    REPEAT ABDOMINAL PARACENTESIS N/A 9/12/2019    Procedure: PARACENTESIS, ABDOMINAL, REPEAT;  Surgeon: [facility] Diagnostic Provider;  Location: [facility] OR;  Service: Radiology;  Laterality: N/A;  9AM START    REPEAT ABDOMINAL PARACENTESIS N/A 12/2/2019    Procedure: PARACENTESIS, ABDOMINAL, REPEAT;  Surgeon: [facility] Diagnostic Provider;  Location: [facility] OR;  Service: Radiology;  Laterality: N/A;  3PM START    REPEAT ABDOMINAL PARACENTESIS N/A 1/10/2020    Procedure: PARACENTESIS, ABDOMINAL, REPEAT;  Surgeon: Dos Diagnostic Provider;  Location: [facility] OR;  Service: Radiology;  Laterality: N/A;  1130AM START    REPEAT ABDOMINAL PARACENTESIS N/A 1/20/2020    Procedure: PARACENTESIS, ABDOMINAL, REPEAT;  Surgeon: Dos Diagnostic Provider;  Location: [facility] OR;  Service: Radiology;  Laterality: N/A;  1PM START    REPEAT ABDOMINAL PARACENTESIS N/A 1/31/2020    Procedure: PARACENTESIS, ABDOMINAL, REPEAT;  Surgeon: Dos Diagnostic Provider;  Location: [facility] OR;  Service: Radiology;  Laterality: N/A;  10AM START    REPEAT ABDOMINAL PARACENTESIS N/A 3/16/2020    Procedure: PARACENTESIS, ABDOMINAL, REPEAT;  Surgeon: Dos Diagnostic Provider;  Location: [facility] OR;  Service: Radiology;  Laterality: N/A;  10AM START    REPEAT ABDOMINAL PARACENTESIS N/A 3/30/2020    Procedure: PARACENTESIS, ABDOMINAL, REPEAT;  Surgeon: Dos Diagnostic Provider;  Location: [facility] OR;  Service: Radiology;  Laterality: N/A;    REPEAT ABDOMINAL PARACENTESIS N/A 4/9/2020     Procedure: PARACENTESIS, ABDOMINAL, REPEAT;  Surgeon: Dosc Diagnostic Provider;  Location: [facility] OR;  Service: Radiology;  Laterality: N/A;  1130AM START    REPEAT ABDOMINAL PARACENTESIS N/A 5/8/2020    Procedure: PARACENTESIS, ABDOMINAL, REPEAT;  Surgeon: Dosc Diagnostic Provider;  [facility];  Service: Radiology;  Laterality: N/A;  9AM START    REPEAT ABDOMINAL PARACENTESIS N/A 5/21/2020    Procedure: PARACENTESIS, ABDOMINAL, REPEAT;  Surgeon: Dosc Diagnostic Provider;  Location: [facility] OR;  Service: Radiology;  Laterality: N/A;  10AM START    REPEAT ABDOMINAL PARACENTESIS N/A 6/5/2020    Procedure: PARACENTESIS, ABDOMINAL, REPEAT;  Surgeon: Dosc Diagnostic Provider;  Location: [facility] OR;  Service: Radiology;  Laterality: N/A;  NOON START    REPEAT ABDOMINAL PARACENTESIS N/A 7/10/2020    Procedure: PARACENTESIS, ABDOMINAL, REPEAT;  Surgeon: Dosc Diagnostic Provider;  Location: [facility] OR;  Service: Radiology;  Laterality: N/A;  1PM START    REPEAT ABDOMINAL PARACENTESIS N/A 8/20/2020    Procedure: PARACENTESIS, ABDOMINAL, REPEAT;  Surgeon: Dosc Diagnostic Provider;  Location: [facility] OR;  Service: Radiology;  Laterality: N/A;  1PM START    REPEAT ABDOMINAL PARACENTESIS N/A 9/4/2020    Procedure: PARACENTESIS, ABDOMINAL, REPEAT;  Surgeon: Dosc Diagnostic Provider;  Location: [facility] OR;  Service: Radiology;  Laterality: N/A;  11 AM START    REPEAT ABDOMINAL PARACENTESIS N/A 9/16/2020    Procedure: PARACENTESIS, ABDOMINAL, REPEAT;  Surgeon: Dosc Diagnostic Provider;  Location: [facility] OR;  Service: Radiology;  Laterality: N/A;  START 2:00 P.M.    REPEAT ABDOMINAL PARACENTESIS N/A 9/28/2020    Procedure: PARACENTESIS, ABDOMINAL, REPEAT;  Surgeon: Dosc Diagnostic Provider;  Location: [facility] OR;  Service: Radiology;  Laterality: N/A;  1 P.M. START    REPEAT ABDOMINAL PARACENTESIS N/A 11/3/2020    Procedure: PARACENTESIS, ABDOMINAL, REPEAT;  Surgeon: Dosc Diagnostic Provider;  Location: [facility] OR;  Service: Radiology;   Laterality: N/A;  11AM START    REPEAT ABDOMINAL PARACENTESIS N/A 11/13/2020    Procedure: PARACENTESIS, ABDOMINAL, REPEAT;  Surgeon: Dosc Diagnostic Provider;  Location: [facility] OR;  Service: Radiology;  Laterality: N/A;  12PM START    REPEAT ABDOMINAL PARACENTESIS N/A 11/23/2020    Procedure: PARACENTESIS, ABDOMINAL, REPEAT;  Surgeon: Dosc Diagnostic Provider;  Location: [facility] OR;  Service: Radiology;  Laterality: N/A;    REPEAT ABDOMINAL PARACENTESIS N/A 12/1/2020    Procedure: PARACENTESIS, ABDOMINAL, REPEAT;  Surgeon: Dosc Diagnostic Provider;  Location: [facility] OR;  Service: Radiology;  Laterality: N/A;  11AM START    REPEAT ABDOMINAL PARACENTESIS N/A 12/8/2020    Procedure: PARACENTESIS, ABDOMINAL, REPEAT;  Surgeon: Dosc Diagnostic Provider;  Location: [facility] OR;  Service: Radiology;  Laterality: N/A;  93AM START    REPEAT ABDOMINAL PARACENTESIS N/A 12/17/2020    Procedure: PARACENTESIS, ABDOMINAL, REPEAT;  Surgeon: Dosc Diagnostic Provider;  Location: [facility] OR;  Service: Radiology;  Laterality: N/A;  10AM START    REPEAT ABDOMINAL PARACENTESIS N/A 12/30/2020    Procedure: PARACENTESIS, ABDOMINAL, REPEAT;  Surgeon: Dosc Diagnostic Provider;  Location: [facility] OR;  Service: Radiology;  Laterality: N/A;  9 A.M. START    REPEAT ABDOMINAL PARACENTESIS N/A 1/12/2021    Procedure: PARACENTESIS, ABDOMINAL, REPEAT;  Surgeon: Dosc Diagnostic Provider;  Location: [facility] OR;  Service: Radiology;  Laterality: N/A;  10 AM START    REPEAT ABDOMINAL PARACENTESIS N/A 2/10/2021    Procedure: PARACENTESIS, ABDOMINAL, REPEAT;  Surgeon: Dosc Diagnostic Provider;  Location: [facility] OR;  Service: Radiology;  Laterality: N/A;  2 P.M. START    REPEAT ABDOMINAL PARACENTESIS N/A 2/18/2021    Procedure: PARACENTESIS, ABDOMINAL, REPEAT;  Surgeon: Dosc Diagnostic Provider;  Location: [facility] OR;  Service: Radiology;  Laterality: N/A;  230PM START    REPEAT ABDOMINAL PARACENTESIS N/A 2/26/2021    Procedure: PARACENTESIS, ABDOMINAL, REPEAT;  Surgeon:  Dosc Diagnostic Provider;  Location: [facility] OR;  Service: Radiology;  Laterality: N/A;    REPEAT ABDOMINAL PARACENTESIS N/A 3/4/2021    Procedure: PARACENTESIS, ABDOMINAL, REPEAT;  Surgeon: Dosc Diagnostic Provider;  Location: [facility] OR;  Service: Radiology;  Laterality: N/A;  9AM START    REPEAT ABDOMINAL PARACENTESIS N/A 3/12/2021    Procedure: PARACENTESIS, ABDOMINAL, REPEAT;  Surgeon: Dosc Diagnostic Provider;  Location: [facility] OR;  Service: Radiology;  Laterality: N/A;  10:00 START TIME  NO PRE-OP REQUIRED    REPEAT ABDOMINAL PARACENTESIS N/A 4/7/2021    Procedure: PARACENTESIS, ABDOMINAL, REPEAT;  Surgeon: Dosc Diagnostic Provider;  Location: [facility] OR;  Service: Radiology;  Laterality: N/A;  1 P.M. START    REPEAT ABDOMINAL PARACENTESIS N/A 4/16/2021    Procedure: PARACENTESIS, ABDOMINAL, REPEAT;  Surgeon: Dosc Diagnostic Provider;  Location: [facility] OR;  Service: Radiology;  Laterality: N/A;  9:OO START  NO PRE-OP REQ'D    REPEAT ABDOMINAL PARACENTESIS N/A 4/26/2021    Procedure: PARACENTESIS, ABDOMINAL, REPEAT;  Surgeon: Dosc Diagnostic Provider;  Location: [facility] OR;  Service: Radiology;  Laterality: N/A;  9AM START    REPEAT ABDOMINAL PARACENTESIS N/A 5/6/2021    Procedure: PARACENTESIS, ABDOMINAL, REPEAT;  Surgeon: Dosc Diagnostic Provider;  Location: [facility] OR;  Service: Radiology;  Laterality: N/A;  3PM START    REPEAT ABDOMINAL PARACENTESIS N/A 5/14/2021    Procedure: PARACENTESIS, ABDOMINAL, REPEAT;  Surgeon: Dosc Diagnostic Provider;  Location: [facility] OR;  Service: Radiology;  Laterality: N/A;  1:00PM START  NO PRE-OP REQ'D    REPEAT ABDOMINAL PARACENTESIS N/A 5/24/2021    Procedure: PARACENTESIS, ABDOMINAL, REPEAT;  Surgeon: Dosc Diagnostic Provider;  Location: [facility] OR;  Service: Radiology;  Laterality: N/A;  2 P.M START TIME    REPEAT ABDOMINAL PARACENTESIS N/A 6/4/2021    Procedure: PARACENTESIS, ABDOMINAL, REPEAT;  Surgeon: Dosc Diagnostic Provider;  Location: [facility] OR;  Service: Radiology;  Laterality:  N/A;  11AM START    REPEAT ABDOMINAL PARACENTESIS N/A 6/14/2021    Procedure: PARACENTESIS, ABDOMINAL, REPEAT;  Surgeon: Jovani Diagnostic Provider;  Location: [facility] OR;  Service: Radiology;  Laterality: N/A;  8AM START    REPEAT ABDOMINAL PARACENTESIS N/A 6/23/2021    Procedure: PARACENTESIS, ABDOMINAL, REPEAT;  Surgeon: Winston Diagnostic Provider;  Location: [facility] OR;  Service: Radiology;  Laterality: N/A;    REVISION OF PROCEDURE INVOLVING GLAUCOMA DRAINAGE DEVICE Left 10/2/2019    EXTRUDING BAERVELDT /WITH PERICARDIAL PATCH GRAFT ()    Right foot surgery  10/2014    TOE AMPUTATION Right     first and second    TOE AMPUTATION Left 11/16/2018    Procedure: AMPUTATION, TOES  2-5;  Surgeon: Mitch Chan MD;  Location: [facility] OR;  Service: Vascular;  Laterality: Left;    TOE AMPUTATION Left 12/5/2018    Procedure: AMPUTATION, TOE;  Surgeon: Mitch Chan MD;  Location: [facility] OR;  Service: Vascular;  Laterality: Left;  left great toe    TONSILLECTOMY       Family History   Problem Relation Age of Onset    Diabetes Mother     Heart disease Brother     No Known Problems Father     No Known Problems Sister     No Known Problems Maternal Aunt     No Known Problems Maternal Uncle     No Known Problems Paternal Aunt     No Known Problems Paternal Uncle     No Known Problems Maternal Grandmother     No Known Problems Maternal Grandfather     No Known Problems Paternal Grandmother     No Known Problems Paternal Grandfather     Anemia Neg Hx     Arrhythmia Neg Hx     Asthma Neg Hx     Clotting disorder Neg Hx     Fainting Neg Hx     Heart attack Neg Hx     Heart failure Neg Hx     Hyperlipidemia Neg Hx     Hypertension Neg Hx     Stroke Neg Hx     Atrial Septal Defect Neg Hx     Amblyopia Neg Hx     Blindness Neg Hx     Glaucoma Neg Hx     Macular degeneration Neg Hx     Retinal detachment Neg Hx     Strabismus Neg Hx      Social History     Tobacco Use    Smoking status:  Former Smoker     Packs/day: 1.00     Years: 3.00     Pack years: 3.00     Types: Cigarettes     Quit date: 1984     Years since quittin.7    Smokeless tobacco: Never Used   Substance Use Topics    Alcohol use: Not Currently     Alcohol/week: 1.0 standard drink     Types: 1 Cans of beer per week     Comment: rarely    Drug use: No        Review of Systems:  ROS not obtained    OBJECTIVE:     Vital Signs (Most Recent):  BP: 113/68 (22 0944)    Physical Exam:  Abdomen: distended  Alert and oriented    Laboratory  CBC:   Lab Results   Component Value Date/Time    WBC 4.58 2021 12:22 PM    RBC 4.61 2021 12:22 PM    HGB 14.1 2021 12:22 PM    HCT 44.3 2021 12:22 PM     2021 12:22 PM    MCV 96 2021 12:22 PM    MCH 30.6 2021 12:22 PM    MCHC 31.8 (L) 2021 12:22 PM     Coagulation:   Lab Results   Component Value Date/Time    INR 1.0 2021 12:00 PM    APTT 29.5 2021 09:35 AM       ASSESSMENT/PLAN:     Ascites.    Patient will undergo paracentesis.    Sedation Plan: Lidocaine local only

## 2022-03-24 NOTE — PROCEDURES
[facility]  Interventional Radiology  High Risk Procedure - Outpatient    Date: 03/24/2022 Time: 9:54 AM    Pre-Op Diagnosis: Recurrent ascites    Post-Op Diagnosis: same    Procedure Performed by: Matheus Marinelli MD    Assistant: none    Procedure: U/S guided paracentesis    Specimen/Tissue Removed: 5600 mL of clear yellow ascites    Estimated Blood Loss: Less than 5 mL    Procedure Note/Findings: U/S guided paracentesis via right lateral abdominal approach with 5 Amharic centesis needle. No immediate post-procedure complications noted.            Please refer to dictated report for additional details.

## 2022-03-24 NOTE — DISCHARGE SUMMARY
Radiology Discharge Summary      Admit date: 3/24/2022  9:00 AM  Discharge date: March 24, 2022    Instructions Given to patient: YesVerbal    Diet: Regular    Activity:NO Restrictions    Medications on discharge (List): Refer to Discharge Medication List    Hospital Course: U/S guided paracentesis    Description of Condition on Discharge: stable    Discharge Disposition: Home    Discharge Diagnosis: Recurrent ascites.    Discharge Procedure Orders   Diet general     Call MD for:  redness, tenderness, or signs of infection (pain, swelling, redness, odor or green/yellow discharge around incision site)     Call MD for:  temperature >100.4     Call MD for:  severe uncontrolled pain     Remove dressing in 24 hours     Activity as tolerated

## 2022-03-29 NOTE — PROGRESS NOTES
[facility]                Progress Note    Subjective:       Patient ID: Marvin Ray is a 62 y.o. male.    Chief Complaint: Wound Check    F/u wound care visit. Patient in w/c to exam room, accompanied by sister. Patient transfers to exam chair per self. Patient denies pain at present. There has been no recent infections, fevers, chills.  He states he did eat 3 pounds of crawfish and that could have contributed to his legs and abdomen swelling more this week.      Review of Systems   Constitutional: Negative.    HENT: Negative.    Eyes: Negative.    Respiratory: Negative.    Cardiovascular: Positive for leg swelling.   Gastrointestinal: Negative.    Musculoskeletal: Negative.    Skin: Positive for wound.   Psychiatric/Behavioral: Negative.          Objective:        Physical Exam  Constitutional:       Appearance: Normal appearance.   HENT:      Head: Normocephalic and atraumatic.      Right Ear: External ear normal.      Left Ear: External ear normal.      Mouth/Throat:      Mouth: Mucous membranes are moist.      Pharynx: Oropharynx is clear.   Eyes:      Extraocular Movements: Extraocular movements intact.      Conjunctiva/sclera: Conjunctivae normal.      Pupils: Pupils are equal, round, and reactive to light.   Cardiovascular:      Rate and Rhythm: Normal rate and regular rhythm.   Pulmonary:      Effort: Pulmonary effort is normal. No respiratory distress.   Abdominal:      General: There is distension.      Palpations: Abdomen is soft.      Tenderness: There is no abdominal tenderness.   Skin:     General: Skin is warm and dry.      Comments: +left dfu to anterior foot, medial foot, and plantar   Neurological:      Mental Status: He is alert and oriented to person, place, and time.      Sensory: Sensory deficit present.         Vitals:    03/29/22 1114   BP: 114/60   Pulse: 83   Resp: 20   Temp: 97.7 °F (36.5 °C)       Assessment:           ICD-10-CM  ICD-9-CM   1. Wound of left foot  S91.302A 892.0   2. Type 2 diabetes mellitus with left diabetic foot ulcer  E11.621 250.80    L97.529 707.15            Wound 12/22/20 1608 Diabetic Ulcer Left distal Foot (Active)   12/22/20 1608    Pre-existing:    Primary Wound Type: Diabetic ulc   Side: Left   Orientation: distal   Location: Foot   Wound Number:    Ankle-Brachial Index:    Pulses:    Removal Indication and Assessment:    Wound Outcome:    (Retired) Wound Type:    (Retired) Wound Length (cm):    (Retired) Wound Width (cm):    (Retired) Depth (cm):    Wound Description (Comments):    Removal Indications:    Wound Image   03/29/22 1100   Wound WDL ex 03/29/22 1100   Dressing Appearance Intact;Moist drainage 03/29/22 1100   Drainage Amount Small 03/29/22 1100   Drainage Characteristics/Odor Serosanguineous 03/29/22 1100   Appearance Pink;Red 03/29/22 1100   Tissue loss description Full thickness 03/29/22 1100   Black (%), Wound Tissue Color 0 % 03/29/22 1100   Red (%), Wound Tissue Color 100 % 03/29/22 1100   Yellow (%), Wound Tissue Color 0 % 03/29/22 1100   Periwound Area Pale white 03/29/22 1100   Wound Edges Defined 03/29/22 1100   Wound Length (cm) 5 cm 03/29/22 1100   Wound Width (cm) 5.5 cm 03/29/22 1100   Wound Depth (cm) 0.2 cm 03/29/22 1100   Wound Volume (cm^3) 5.5 cm^3 03/29/22 1100   Wound Surface Area (cm^2) 27.5 cm^2 03/29/22 1100   Care Cleansed with:;Soap and water;Sterile normal saline 03/29/22 1100   Dressing Changed 03/29/22 1100   Off Loading Football dressing;Off loading shoe 03/29/22 1100            Wound 12/22/20 1305 Diabetic Ulcer Left medial Foot (Active)   12/22/20 1305    Pre-existing:    Primary Wound Type: Diabetic ulc   Side: Left   Orientation: medial   Location: Foot   Wound Number:    Ankle-Brachial Index:    Pulses:    Removal Indication and Assessment:    Wound Outcome:    (Retired) Wound Type:    (Retired) Wound Length (cm):    (Retired) Wound Width (cm):    (Retired) Depth  (cm):    Wound Description (Comments):    Removal Indications:    Wound Image   03/29/22 1100   Wound WDL ex 03/29/22 1100   Dressing Appearance Intact;Moist drainage 03/29/22 1100   Drainage Amount Small 03/29/22 1100   Drainage Characteristics/Odor Serosanguineous 03/29/22 1100   Appearance Red;Pink 03/29/22 1100   Tissue loss description Full thickness 03/29/22 1100   Black (%), Wound Tissue Color 0 % 03/29/22 1100   Red (%), Wound Tissue Color 100 % 03/29/22 1100   Yellow (%), Wound Tissue Color 0 % 03/29/22 1100   Periwound Area Dry 03/29/22 1100   Wound Edges Defined 03/29/22 1100   Wound Length (cm) 5.4 cm 03/29/22 1100   Wound Width (cm) 5.3 cm 03/29/22 1100   Wound Depth (cm) 0.2 cm 03/29/22 1100   Wound Volume (cm^3) 5.724 cm^3 03/29/22 1100   Wound Surface Area (cm^2) 28.62 cm^2 03/29/22 1100   Care Cleansed with:;Soap and water;Sterile normal saline 03/29/22 1100   Dressing Changed 03/29/22 1100   Off Loading Football dressing;Off loading shoe 03/29/22 1100            Wound 03/29/22 1119 Traumatic Left posterior Calf (Active)   03/29/22 1119    Pre-existing:    Primary Wound Type: Traumatic   Side: Left   Orientation: posterior   Location: Calf   Wound Number:    Ankle-Brachial Index:    Pulses:    Removal Indication and Assessment:    Wound Outcome:    (Retired) Wound Type:    (Retired) Wound Length (cm):    (Retired) Wound Width (cm):    (Retired) Depth (cm):    Wound Description (Comments):    Removal Indications:    Wound Image   03/29/22 1100   Wound WDL ex 03/29/22 1100   Dressing Appearance Intact;Moist drainage 03/29/22 1100   Drainage Amount Small 03/29/22 1100   Drainage Characteristics/Odor Serosanguineous 03/29/22 1100   Appearance Export 03/29/22 1100   Tissue loss description Partial thickness 03/29/22 1100   Black (%), Wound Tissue Color 0 % 03/29/22 1100   Red (%), Wound Tissue Color 100 % 03/29/22 1100   Yellow (%), Wound Tissue Color 0 % 03/29/22 1100   Periwound Area Dry;Intact 03/29/22  1100   Wound Edges Defined 03/29/22 1100   Wound Length (cm) 1 cm 03/29/22 1100   Wound Width (cm) 2.3 cm 03/29/22 1100   Wound Depth (cm) 0.1 cm 03/29/22 1100   Wound Volume (cm^3) 0.23 cm^3 03/29/22 1100   Wound Surface Area (cm^2) 2.3 cm^2 03/29/22 1100   Care Cleansed with:;Soap and water;Sterile normal saline 03/29/22 1100   Dressing Applied 03/29/22 1100           Plan:                Orders Placed This Encounter   Procedures    Change dressing     Clean wound with NS. Gent Violet to periwound. Endoform/Medihoney to all wounds, cover with gauze then abd pad. Light Football (cast padding x1 per pt request, stockinet). Darco shoe. Change every 2 days per sister at home using medihoney, or as needed if drainage increases with strike-through or increased periwound maceration noted. Pt to return to [facility] in 2 wks.     Wound dressing to left foot intact with small amount of drainage noted. Wound to left distal foot measuring smaller length and increased width. Wound to left medial foot measuring smaller length and larger width. Wound care done per order. RTC in two weeks   Follow up in about 2 weeks (around 4/12/2022) for wound care visit.     Rubén Davis MD

## 2022-03-31 NOTE — TELEPHONE ENCOUNTER
No new care gaps identified.  Powered by [employer]. Reference number: [id number].   3/31/2022 3:37:26 AM CDT

## 2022-03-31 NOTE — DISCHARGE SUMMARY
Radiology Discharge Summary      Hospital Course: No complications    Admit Date: 3/31/2022  Discharge Date: 03/31/2022     Instructions Given to Patient: Yes  Diet: Resume prior diet  Activity: activity as tolerated    Description of Condition on Discharge: Stable  Vital Signs (Most Recent): BP: 132/76 (03/31/22 1025)    Discharge Disposition: Home    Discharge Diagnosis:   62 y/o M with h/o recurrent painful, tense ascites s/p successful US-guided percutaneous RUQ-approach therapeutic LVP with local anesthetic only and albumin infusion post as indicated per institutional protocol. Patient tolerated the procedures well. No immediate post-procedural complications noted.      Thank you for considering IR for the care of your patient.      Zach Cha MD  Interventional Radiology

## 2022-03-31 NOTE — TELEPHONE ENCOUNTER
This Rx Request does not qualify for assessment with the OR   Please review protocol details and the Care Due Message for extra clinical information    Reasons Rx Request may be deferred:  Patient has been seen in the ED/Hospital since the last PCP visit  Pt due for OV with PCP    Note composed:8:01 AM 03/31/2022

## 2022-03-31 NOTE — BRIEF OP NOTE
Radiology Post-Procedure Note     Pre Op Diagnosis: Recurrent painful, tense ascites  Post Op Diagnosis: Same     Procedure: 1. US-guided percutaneous RUQ-approach therapeutic LVP     Procedure performed by: Zach Cha MD     Written Informed Consent Obtained: Yes  Specimen Removed: YES, 6,200-L of slightly cloudy, serous fluid  Estimated Blood Loss: none     Findings:   Successful US-guided percutaneous RUQ-approach therapeutic LVP with local anesthetic only and albumin infusion post PRN as indicated per institutional protocol. Patient tolerated the procedure well. No immediate post-procedural complications noted.      Thank you for considering IR for the care of your patient.      Zach Cha MD  Interventional Radiology

## 2022-03-31 NOTE — H&P
Radiology History & Physical      SUBJECTIVE:     Chief Complaint: Recurrent painful, tense ascites      History of Present Illness:  Marvin Ray is a 62 y.o. male with PMHx of CKD III and combined systolic/diastolic CHF complicated by recurrent abdominal distension and pain 2/2 recurrent painful, tense ascites requiring frequent  decompression for symptom relief.      A new outpatient IR consult placed for US-guided percutaneous RUQ-approach therapeutic large-volume paracentesis.    Past Medical History:   Diagnosis Date    Arthritis     Cellulitis     CKD (chronic kidney disease), stage III     Coronary artery disease     Diabetes mellitus     Diabetic retinopathy     Diabetic ulcer of left foot     Glaucoma     Gout     Hyperlipemia     Hypertension     ICD (implantable cardioverter-defibrillator) in place 11/02/2018    Left chest    Non-pressure chronic ulcer of other part of left foot with fat layer exposed 10/23/2018    PVD (peripheral vascular disease)     Type 2 diabetes mellitus with left diabetic foot ulcer 10/29/2018    Unsteady gait     uses a wheelchair     Past Surgical History:   Procedure Laterality Date    AHMED GLAUCOMA IMPLANT Left 2011    [facility]ONE AT [facility]    AORTOGRAPHY WITH SERIALOGRAPHY Left 4/30/2019    Procedure: AORTOGRAM, WITH SERIALOGRAPHY, LEFT LOWER EXTREMITY, POSSIBLE INTERVENTION;  Surgeon: Mitch Chan MD;  Location: [facility] OR;  Service: Vascular;  Laterality: Left;  RN PRE OP 4-23-19.  NO H & P, No Cosent  [place]    AORTOGRAPHY WITH SERIALOGRAPHY Right 10/6/2020    Procedure: AORTOGRAM, WITH SERIALOGRAPHY, RIGHT LOWER EXTREMITY ANGIOGRAM, POSSIBLE INTERVENTION;  Surgeon: Mitch Chan MD;  Location: [facility] OR;  Service: Vascular;  Laterality: Right;  RN PREOP 10/1/2020--COVID NEGATIVE ON 10/5    AORTOGRAPHY WITH SERIALOGRAPHY Right 1/13/2021    Procedure: AORTOGRAM, WITH RIGHT LOWER EXTREMITY ANGIOGRAM, POSSIBLE INTERVENTION;  Surgeon: Mitch BAINS  MD Dustin;  Location: [facility] OR;  Service: Vascular;  Laterality: Right;  1PM START----NEED CONSENT  RN PREOP 1/8/2021--COVID NEGATIVE ON 1/12    AORTOGRAPHY WITH SERIALOGRAPHY Right 1/26/2021    Procedure: AORTOGRAM, RIGHT LOWER EXTREMITY ANGIOGRAM, POSSIBLE INTERVENTION;  Surgeon: Mitch Chan MD;  Location: [facility] OR;  Service: Vascular;  Laterality: Right;  RN PREOP 1/25/2021, COVID NEGATIVE ON 1/25, cxr, and ecg done    AORTOGRAPHY WITH SERIALOGRAPHY Right 3/30/2021    Procedure: AORTOGRAM, WITH RIGHT LOWER EXTREMITY ANGIOGRAM, POSSIBLE INTERVENTION;  Surgeon: Mitch Chan MD;  Location: [facility] OR;  Service: Vascular;  Laterality: Right;  RN PREOP 3/26/2021  COVID NEGATIVE    BAERVELDT GLAUCOMA IMPLANT Left 2012    WITH CATARACT EXTRACTION//DONE AT [facility]    BELOW KNEE AMPUTATION OF LOWER EXTREMITY Right 5/17/2021    Procedure: AMPUTATION, BELOW KNEE;  Surgeon: Christiana Pritchett MD;  Location: [facility] OR;  Service: Orthopedics;  Laterality: Right;  WOUND VAC -PREVENA  SUPINE---HAS--ICD- Iast interrogation -3/21  AMPUTATION TRAY  1ST CASE OKWHITNEY WHITLOCK  RN PREOP 5/13/2021     COVID --NEGATIVE ON 5/14---INCOMPLETE H/P    BIOPSY Right 12/18/2020    Procedure: Biopsy- trocar biopsy;  Surgeon: Brianna Champion DPM;  Location: [facility] OR;  Service: Podiatry;  Laterality: Right;    CARDIAC CATHETERIZATION Left 05/2016    CARDIAC DEFIBRILLATOR PLACEMENT Left 11/02/2018    CATARACT EXTRACTION W/  INTRAOCULAR LENS IMPLANT Left 2012    WITH BAERVEDT (DONE AT [facility])    CATARACT EXTRACTION W/  INTRAOCULAR LENS IMPLANT Right 09/26/2018    COMPLEX ()    CB DESTRUCTION WITH CYCLO G6 Left 02/15/2017        CYST REMOVAL      DEBRIDEMENT OF FOOT  12/28/2018    Procedure: DEBRIDEMENT, FOOT;  Surgeon: Mitch Chan MD;  Location: [facility] OR;  Service: Vascular;;    DEBRIDEMENT OF FOOT  6/5/2019    Procedure: DEBRIDEMENT, FOOT;  Surgeon: Mitch Chan MD;  Location: [facility] OR;   Service: Vascular;;    DEBRIDEMENT OF FOOT Right 3/5/2021    Procedure: DEBRIDEMENT, FOOT with Misonic device;  Surgeon: Mitch Wall MD;  Location: [facility] OR;  Service: Vascular;  Laterality: Right;    EXAMINATION UNDER ANESTHESIA Left 12/5/2018    Procedure: Exam under anesthesia, left foot debridement, washout and all other indicated procedures;  Surgeon: Mitch Wall MD;  Location: [facility] OR;  Service: Vascular;  Laterality: Left;  1030AM START  RN PREOP 12/3/2018-----AICD  SEEN BY DR JAMES 12/4    EXAMINATION UNDER ANESTHESIA Left 2/13/2019    Procedure: LEFT FOOT WOUND DEBRIDEMENT, WASHOUT AND ALL OTHER INDICATED PROCEDURES;  Surgeon: Mitch Wall MD;  Location: [facility] OR;  Service: Vascular;  Laterality: Left;  requesting 1st case start  THIS CASE 1ST PER DR WALL ON 2/1/19 @ 844AM -LO  RN PREOP 2/6/2019  HAS CARDIAC CLEARANCE    EXAMINATION UNDER ANESTHESIA Left 12/28/2018    Procedure: Exam under anesthesia, left foot debridement, left foot washout, possible left second through fifth metatarsal resection;  Surgeon: Mitch Wall MD;  Location: [facility] OR;  Service: Vascular;  Laterality: Left;  1:00pm start per julissa @ 8:58am  RN  PHONE PRE OP 12-27-18--=Pt coming from [facility] on [facility]  NEED CONSENT    EXAMINATION UNDER ANESTHESIA Left 6/5/2019    Procedure: LEFT FOOT DEBRIDEMENT, WASHOUT AND ALL OTHER INDICATED PROCEDURES;  Surgeon: Mitch Wall MD;  Location: [facility] OR;  Service: Vascular;  Laterality: Left;  RN PRE OP 5-31-19------ICD------NEED CONSENT    EXAMINATION UNDER ANESTHESIA Right 11/9/2020    Procedure: RIGHT FOOT DEBRIDEMENT, WASHOUT AND ALL OTHER INDICATED PROCEDURES;  Surgeon: Mitch Wall MD;  Location: [facility] OR;  Service: Vascular;  Laterality: Right;  RN PREOP 10/27/2020, --COVID NEGATIVE ON 11/6, has cardiac clearance/[place] 10/27/2020, pt has ICD  NEEDS ORDERS    EXAMINATION UNDER ANESTHESIA Right 2/4/2021    Procedure: RIGHT FOOT  DEBRIDEMENT, POSSIBLE CALCANECTOMY, POSSIBLE FIFTH TOE AMPUTATION, WASHOUT AND ALL OTHER INDICATED PROCEDURES;  Surgeon: Mitch Chan MD;  Location: [facility] OR;  Service: Vascular;  Laterality: Right;  RN PREOP 2/2/2021--COVID NEGATIVE ON 2/2  ICD    EYE SURGERY      CAT EXT OU    FOOT AMPUTATION THROUGH METATARSAL Right 3/5/2021    Procedure: Right foot wound debridement, possible transmetatarsal amputation, possible fifth toe amputation, washout;  Surgeon: Mitch Chan MD;  Location: [facility] OR;  Service: Vascular;  Laterality: Right;  MISONIX SONIC ONE JAYDEN LOUISE [phone] SPOKE TO HIM ON MY OFFICE @ 7:31AM ON 2-  RN PRE Op, Covid NEGATIVE ON  3-2-21.  C A  8:30AM START----NEED H/P    INCISION AND DRAINAGE Left 11/14/2018    Procedure: Incision and Drainage, left lower extremity debridement, washout;  Surgeon: Mitch Chan MD;  Location: [facility] OR;  Service: Vascular;  Laterality: Left;    INCISION AND DRAINAGE Left 11/16/2018    Procedure: INCISION AND DRAINAGE;  Surgeon: Mitch Chan MD;  Location: [facility] OR;  Service: Vascular;  Laterality: Left;    INCISION AND DRAINAGE Right 11/18/2020    Procedure: Debridement and washout right lower extremity wounds;  Surgeon: Mitch Chan MD;  Location: [facility] OR 2ND FLR;  Service: Vascular;  Laterality: Right;    INCISION AND DRAINAGE Right 1/27/2021    Procedure: RIGHT FOOT DEBRIDEMENT, WASHOUT AND ALL OTHER INDICATED PROCEDURES;  Surgeon: Mitch Chan MD;  Location: [facility] OR 2ND FLR;  Service: Vascular;  Laterality: Right;    INTRAOCULAR PROSTHESES INSERTION Right 9/26/2018    Procedure: INSERTION, IOL PROSTHESIS;  Surgeon: Perla Cortés MD;  Location: [facility] OR 1ST FLR;  Service: Ophthalmology;  Laterality: Right;    PERITONEOCENTESIS N/A 3/1/2019    Procedure: PARACENTESIS, ABDOMINAL, INITIAL;  Surgeon: [facility] Diagnostic Provider;  Location: [facility] OR;  Service: Radiology;  Laterality: N/A;     PERITONEOCENTESIS N/A 3/25/2019    Procedure: PARACENTESIS, ABDOMINAL, INITIAL;  Surgeon: Dosc Diagnostic Provider;  [facility] OR;  Service: Radiology;  Laterality: N/A;    PERITONEOCENTESIS N/A 7/22/2019    Procedure: PARACENTESIS, ABDOMINAL, INITIAL;  Surgeon: Dosc Diagnostic Provider;  Location: [facility] OR;  Service: Radiology;  Laterality: N/A;    PERITONEOCENTESIS N/A 8/16/2019    Procedure: PARACENTESIS, ABDOMINAL, INITIAL;  Surgeon: Dosc Diagnostic Provider;  [facility];  Service: Radiology;  Laterality: N/A;    PERITONEOCENTESIS N/A 9/26/2019    Procedure: PARACENTESIS, ABDOMINAL;  Surgeon: Dosc Diagnostic Provider;  [facility];  Service: Radiology;  Laterality: N/A;    PERITONEOCENTESIS N/A 10/11/2019    Procedure: PARACENTESIS, ABDOMINAL;  Surgeon: Dosc Diagnostic Provider;  [facility];  Service: Radiology;  Laterality: N/A;    PERITONEOCENTESIS N/A 10/31/2019    Procedure: PARACENTESIS, ABDOMINAL;  Surgeon: Dosc Diagnostic Provider;  [facility];  Service: Radiology;  Laterality: N/A;    PERITONEOCENTESIS N/A 11/18/2019    Procedure: PARACENTESIS, ABDOMINAL;  Surgeon: Dosc Diagnostic Provider;  [facility];  Service: Radiology;  Laterality: N/A;    PERITONEOCENTESIS N/A 12/16/2019    Procedure: PARACENTESIS, ABDOMINAL;  Surgeon: Dosc Diagnostic Provider;  [facility];  Service: Radiology;  Laterality: N/A;  2PM START    PERITONEOCENTESIS N/A 2/7/2020    Procedure: PARACENTESIS, ABDOMINAL;  Surgeon: Dosc Diagnostic Provider;  [facility];  Service: Radiology;  Laterality: N/A;  REQUESTED 10:30A START    PERITONEOCENTESIS N/A 2/19/2020    Procedure: PARACENTESIS, ABDOMINAL;  Surgeon: Dosc Diagnostic Provider;  [facility];  Service: Radiology;  Laterality: N/A;    PERITONEOCENTESIS N/A 2/28/2020    Procedure: PARACENTESIS, ABDOMINAL;  Surgeon: Dosc Diagnostic Provider;  Location: [facility] OR;  Service: Radiology;  Laterality: N/A;  REQUESTED 10AM  START    PHACOEMULSIFICATION OF CATARACT Right 9/26/2018    Procedure: PHACOEMULSIFICATION, CATARACT;  Surgeon: Perla Cortés MD;  Location: [facility] OR [address] [place] [address];  Service: Ophthalmology;  Laterality: Right;    REPEAT ABDOMINAL PARACENTESIS N/A 2/11/2019    Procedure: PARACENTESIS, ABDOMINAL, REPEAT;  Surgeon: Dosc Diagnostic Provider;  Location: [facility] OR;  Service: Radiology;  Laterality: N/A;  1PM START    REPEAT ABDOMINAL PARACENTESIS N/A 9/12/2019    Procedure: PARACENTESIS, ABDOMINAL, REPEAT;  Surgeon: Dosc Diagnostic Provider;  Location: [facility] OR;  Service: Radiology;  Laterality: N/A;  9AM START    REPEAT ABDOMINAL PARACENTESIS N/A 12/2/2019    Procedure: PARACENTESIS, ABDOMINAL, REPEAT;  Surgeon: Dosc Diagnostic Provider;  Location: [facility] OR;  Service: Radiology;  Laterality: N/A;  3PM START    REPEAT ABDOMINAL PARACENTESIS N/A 1/10/2020    Procedure: PARACENTESIS, ABDOMINAL, REPEAT;  Surgeon: Dosc Diagnostic Provider;  Location: [facility] OR;  Service: Radiology;  Laterality: N/A;  1130AM START    REPEAT ABDOMINAL PARACENTESIS N/A 1/20/2020    Procedure: PARACENTESIS, ABDOMINAL, REPEAT;  Surgeon: Dosc Diagnostic Provider;  Location: [facility] OR;  Service: Radiology;  Laterality: N/A;  1PM START    REPEAT ABDOMINAL PARACENTESIS N/A 1/31/2020    Procedure: PARACENTESIS, ABDOMINAL, REPEAT;  Surgeon: Dosc Diagnostic Provider;  Location: [facility] OR;  Service: Radiology;  Laterality: N/A;  10AM START    REPEAT ABDOMINAL PARACENTESIS N/A 3/16/2020    Procedure: PARACENTESIS, ABDOMINAL, REPEAT;  Surgeon: Dosc Diagnostic Provider;  Location: [facility] OR;  Service: Radiology;  Laterality: N/A;  10AM START    REPEAT ABDOMINAL PARACENTESIS N/A 3/30/2020    Procedure: PARACENTESIS, ABDOMINAL, REPEAT;  Surgeon: Dosc Diagnostic Provider;  Location: [facility] OR;  Service: Radiology;  Laterality: N/A;    REPEAT ABDOMINAL PARACENTESIS N/A 4/9/2020    Procedure: PARACENTESIS, ABDOMINAL, REPEAT;  Surgeon: Dosc Diagnostic Provider;   Location: [facility] OR;  Service: Radiology;  Laterality: N/A;  1130AM START    REPEAT ABDOMINAL PARACENTESIS N/A 5/8/2020    Procedure: PARACENTESIS, ABDOMINAL, REPEAT;  Surgeon: Dosc Diagnostic Provider;  Location: [facility] OR;  Service: Radiology;  Laterality: N/A;  9AM START    REPEAT ABDOMINAL PARACENTESIS N/A 5/21/2020    Procedure: PARACENTESIS, ABDOMINAL, REPEAT;  Surgeon: Dosc Diagnostic Provider;  Location: [facility] OR;  Service: Radiology;  Laterality: N/A;  10AM START    REPEAT ABDOMINAL PARACENTESIS N/A 6/5/2020    Procedure: PARACENTESIS, ABDOMINAL, REPEAT;  Surgeon: Dosc Diagnostic Provider;  Location: [facility] OR;  Service: Radiology;  Laterality: N/A;  NOON START    REPEAT ABDOMINAL PARACENTESIS N/A 7/10/2020    Procedure: PARACENTESIS, ABDOMINAL, REPEAT;  Surgeon: Dosc Diagnostic Provider;  Location: [facility] OR;  Service: Radiology;  Laterality: N/A;  1PM START    REPEAT ABDOMINAL PARACENTESIS N/A 8/20/2020    Procedure: PARACENTESIS, ABDOMINAL, REPEAT;  Surgeon: Dosc Diagnostic Provider;  Location: [facility] OR;  Service: Radiology;  Laterality: N/A;  1PM START    REPEAT ABDOMINAL PARACENTESIS N/A 9/4/2020    Procedure: PARACENTESIS, ABDOMINAL, REPEAT;  Surgeon: Dosc Diagnostic Provider;  Location: [facility] OR;  Service: Radiology;  Laterality: N/A;  11 AM START    REPEAT ABDOMINAL PARACENTESIS N/A 9/16/2020    Procedure: PARACENTESIS, ABDOMINAL, REPEAT;  Surgeon: Dosc Diagnostic Provider;  Location: [facility] OR;  Service: Radiology;  Laterality: N/A;  START 2:00 P.M.    REPEAT ABDOMINAL PARACENTESIS N/A 9/28/2020    Procedure: PARACENTESIS, ABDOMINAL, REPEAT;  Surgeon: Dosc Diagnostic Provider;  Location: [facility] OR;  Service: Radiology;  Laterality: N/A;  1 P.M. START    REPEAT ABDOMINAL PARACENTESIS N/A 11/3/2020    Procedure: PARACENTESIS, ABDOMINAL, REPEAT;  Surgeon: Dosc Diagnostic Provider;  Location: [facility] OR;  Service: Radiology;  Laterality: N/A;  11AM START    REPEAT ABDOMINAL PARACENTESIS N/A 11/13/2020     Procedure: PARACENTESIS, ABDOMINAL, REPEAT;  Surgeon: Dosc Diagnostic Provider;  Location: [facility] OR;  Service: Radiology;  Laterality: N/A;  12PM START    REPEAT ABDOMINAL PARACENTESIS N/A 11/23/2020    Procedure: PARACENTESIS, ABDOMINAL, REPEAT;  Surgeon: Dosc Diagnostic Provider;  Location: [facility] OR;  Service: Radiology;  Laterality: N/A;    REPEAT ABDOMINAL PARACENTESIS N/A 12/1/2020    Procedure: PARACENTESIS, ABDOMINAL, REPEAT;  Surgeon: Dosc Diagnostic Provider;  Location: [facility] OR;  Service: Radiology;  Laterality: N/A;  11AM START    REPEAT ABDOMINAL PARACENTESIS N/A 12/8/2020    Procedure: PARACENTESIS, ABDOMINAL, REPEAT;  Surgeon: Dosc Diagnostic Provider;  Location: [facility] OR;  Service: Radiology;  Laterality: N/A;  93AM START    REPEAT ABDOMINAL PARACENTESIS N/A 12/17/2020    Procedure: PARACENTESIS, ABDOMINAL, REPEAT;  Surgeon: Dosc Diagnostic Provider;  Location: [facility] OR;  Service: Radiology;  Laterality: N/A;  10AM START    REPEAT ABDOMINAL PARACENTESIS N/A 12/30/2020    Procedure: PARACENTESIS, ABDOMINAL, REPEAT;  Surgeon: Dosc Diagnostic Provider;  Location: [facility] OR;  Service: Radiology;  Laterality: N/A;  9 A.M. START    REPEAT ABDOMINAL PARACENTESIS N/A 1/12/2021    Procedure: PARACENTESIS, ABDOMINAL, REPEAT;  Surgeon: Dosc Diagnostic Provider;  Location: [facility] OR;  Service: Radiology;  Laterality: N/A;  10 AM START    REPEAT ABDOMINAL PARACENTESIS N/A 2/10/2021    Procedure: PARACENTESIS, ABDOMINAL, REPEAT;  Surgeon: Dosc Diagnostic Provider;  Location: [facility] OR;  Service: Radiology;  Laterality: N/A;  2 P.M. START    REPEAT ABDOMINAL PARACENTESIS N/A 2/18/2021    Procedure: PARACENTESIS, ABDOMINAL, REPEAT;  Surgeon: Dosc Diagnostic Provider;  Location: [facility] OR;  Service: Radiology;  Laterality: N/A;  230PM START    REPEAT ABDOMINAL PARACENTESIS N/A 2/26/2021    Procedure: PARACENTESIS, ABDOMINAL, REPEAT;  Surgeon: Dosc Diagnostic Provider;  Location: [facility] OR;  Service: Radiology;  Laterality:  N/A;    REPEAT ABDOMINAL PARACENTESIS N/A 3/4/2021    Procedure: PARACENTESIS, ABDOMINAL, REPEAT;  Surgeon: Dosc Diagnostic Provider;  Location: [facility] OR;  Service: Radiology;  Laterality: N/A;  9AM START    REPEAT ABDOMINAL PARACENTESIS N/A 3/12/2021    Procedure: PARACENTESIS, ABDOMINAL, REPEAT;  Surgeon: Dosc Diagnostic Provider;  Location: [facility] OR;  Service: Radiology;  Laterality: N/A;  10:00 START TIME  NO PRE-OP REQUIRED    REPEAT ABDOMINAL PARACENTESIS N/A 4/7/2021    Procedure: PARACENTESIS, ABDOMINAL, REPEAT;  Surgeon: Dosc Diagnostic Provider;  Location: [facility] OR;  Service: Radiology;  Laterality: N/A;  1 P.M. START    REPEAT ABDOMINAL PARACENTESIS N/A 4/16/2021    Procedure: PARACENTESIS, ABDOMINAL, REPEAT;  Surgeon: Dosc Diagnostic Provider;  Location: [facility] OR;  Service: Radiology;  Laterality: N/A;  9:OO START  NO PRE-OP REQ'D    REPEAT ABDOMINAL PARACENTESIS N/A 4/26/2021    Procedure: PARACENTESIS, ABDOMINAL, REPEAT;  Surgeon: Dosc Diagnostic Provider;  Location: [facility] OR;  Service: Radiology;  Laterality: N/A;  9AM START    REPEAT ABDOMINAL PARACENTESIS N/A 5/6/2021    Procedure: PARACENTESIS, ABDOMINAL, REPEAT;  Surgeon: Dosc Diagnostic Provider;  Location: [facility] OR;  Service: Radiology;  Laterality: N/A;  3PM START    REPEAT ABDOMINAL PARACENTESIS N/A 5/14/2021    Procedure: PARACENTESIS, ABDOMINAL, REPEAT;  Surgeon: Dosc Diagnostic Provider;  Location: [facility] OR;  Service: Radiology;  Laterality: N/A;  1:00PM START  NO PRE-OP REQ'D    REPEAT ABDOMINAL PARACENTESIS N/A 5/24/2021    Procedure: PARACENTESIS, ABDOMINAL, REPEAT;  Surgeon: Dosc Diagnostic Provider;  Location: [facility] OR;  Service: Radiology;  Laterality: N/A;  2 P.M START TIME    REPEAT ABDOMINAL PARACENTESIS N/A 6/4/2021    Procedure: PARACENTESIS, ABDOMINAL, REPEAT;  Surgeon: Dosc Diagnostic Provider;  Location: [facility] OR;  Service: Radiology;  Laterality: N/A;  11AM START    REPEAT ABDOMINAL PARACENTESIS N/A 6/14/2021    Procedure:  PARACENTESIS, ABDOMINAL, REPEAT;  Surgeon: [facility] Diagnostic Provider;  Location: [facility] OR;  Service: Radiology;  Laterality: N/A;  8AM START    REPEAT ABDOMINAL PARACENTESIS N/A 6/23/2021    Procedure: PARACENTESIS, ABDOMINAL, REPEAT;  Surgeon: [facility] Diagnostic Provider;  Location: [facility] OR;  Service: Radiology;  Laterality: N/A;    REVISION OF PROCEDURE INVOLVING GLAUCOMA DRAINAGE DEVICE Left 10/2/2019    EXTRUDING BAERVELDT /WITH PERICARDIAL PATCH GRAFT ()    Right foot surgery  10/2014    TOE AMPUTATION Right     first and second    TOE AMPUTATION Left 11/16/2018    Procedure: AMPUTATION, TOES  2-5;  Surgeon: Mitch Chan MD;  Location: [facility] OR;  Service: Vascular;  Laterality: Left;    TOE AMPUTATION Left 12/5/2018    Procedure: AMPUTATION, TOE;  Surgeon: Mitch Chan MD;  Location: [facility] OR;  Service: Vascular;  Laterality: Left;  left great toe    TONSILLECTOMY       Home Meds:   Prior to Admission medications    Medication Sig Start Date End Date Taking? Authorizing Provider   acetaminophen (TYLENOL) 325 MG tablet Take 650 mg by mouth every 6 (six) hours as needed for Pain.    Historical Provider   allopurinoL (ZYLOPRIM) 300 MG tablet Take 1 tablet (300 mg total) by mouth once daily. 9/28/21   Rubén Davis MD   aspirin (ECOTRIN) 81 MG EC tablet Take 81 mg by mouth once daily.    Historical Provider   blood sugar diagnostic Strp To check BG 2 times daily, to use with insurance preferred meter. e11.65 1/18/22   Sheryl Madison NP   blood-glucose meter kit To check BG 2 times daily, to use with insurance preferred meter 1/18/22 1/18/23  Sheryl Madison NP   brimonidine 0.2% (ALPHAGAN) 0.2 % Drop INSTILL 1 DROP IN BOTH EYES THREE TIMES DAILY 1/9/22   Perla Cortés MD   carvediloL (COREG) 3.125 MG tablet Take 1 tablet (3.125 mg total) by mouth 2 (two) times daily with meals. 2/14/22   Rubén Davis MD   clopidogreL (PLAVIX) 75 mg tablet Take 1 tablet (75 mg total) by mouth  "once daily. 1/14/21 1/14/22  Mitch Chan MD   coenzyme Q10 100 mg capsule Take 100 mg by mouth every morning.    Historical Provider   collagenase (SANTYL) ointment Apply topically to diabetic ulcer on lower right leg once daily. 4/13/21   Rubén Davis MD   dorzolamide-timolol 2-0.5% (COSOPT) 22.3-6.8 mg/mL ophthalmic solution Place 1 drop into both eyes 3 (three) times daily. 12/16/21   Perla Cortés MD   ezetimibe (ZETIA) 10 mg tablet TAKE 1 TABLET(10 MG) BY MOUTH EVERY DAY 12/30/21   Rubén Davis MD   fish oil-omega-3 fatty acids 300-1,000 mg capsule Take 1 capsule by mouth 2 (two) times daily. 1/23/19   Sara Ching MD   gabapentin (NEURONTIN) 100 MG capsule TAKE 2 CAPSULES(200 MG) BY MOUTH EVERY EVENING 10/8/21   Rubén Davis MD   HYDROcodone-acetaminophen (NORCO) 5-325 mg per tablet Take 1 tablet by mouth every 12 (twelve) hours as needed for Pain. 9/21/21   Rubén Davis MD   insulin degludec-liraglutide (XULTOPHY 100/3.6) 100 unit-3.6 mg /mL (3 mL) Pen Inject 35 Units into the skin once daily. 1/18/22   Sheryl Madison NP   lancets Misc To check BG 2 times daily, to use with insurance preferred meter. Dx code e11.65 1/18/22   Sheryl Madison NP   loratadine (CLARITIN) 10 mg tablet Take 10 mg by mouth daily as needed for Allergies.    Historical Provider   metOLazone (ZAROXOLYN) 2.5 MG tablet Take 1 tablet (2.5 mg total) by mouth once daily. 3/8/22   Rubén Davis MD   MULTIVIT,THER IRON,CA,FA & MIN (MULTIVITAMIN) Tab Take 1 tablet by mouth every morning.     Historical Provider   pen needle, diabetic (BD ULTRA-FINE AMADOR PEN NEEDLE) 32 gauge x 5/32" Ndle 1 Device by Misc.(Non-Drug; Combo Route) route 2 (two) times a day. 9/11/20   Sheryl Madison NP   torsemide (DEMADEX) 20 MG Tab TAKE 4 TABLETS(80 MG) BY MOUTH TWICE DAILY 3/31/22   Rubén Davis MD   torsemide (DEMADEX) 20 MG Tab TAKE 4 TABLETS(80 MG) BY MOUTH TWICE DAILY 1/2/22 3/31/22  Rubén Davis MD "     Anticoagulants/Antiplatelets: aspirin and Plavix    Allergies:   Review of patient's allergies indicates:   Allergen Reactions    Statins-hmg-coa reductase inhibitors      Generalized Pain    Onglyza [saxagliptin]     Penicillins Rash     Sedation History:  no adverse reactions     Review of Systems:   Hematological: no known coagulopathies  Respiratory: positive for - orthopnea and shortness of breath  Cardiovascular: positive for - dyspnea on exertion, orthopnea and shortness of breath  Gastrointestinal: positive for - abdominal pain and distension  Genito-Urinary: no dysuria, trouble voiding, or hematuria  Musculoskeletal: negative  Neurological: no TIA or stroke symptoms      OBJECTIVE:     Vital Signs (Most Recent)     Physical Exam:  General: no acute distress  Mental Status: alert and oriented to person, place and time  HEENT: normocephalic, atraumatic  Chest: mild labored breathing  Heart: regular heart rate  Abdomen: +distended with +fluid-wave. No TTP/r/g.  Extremity: moves all extremities    Laboratory  Lab Results   Component Value Date    INR 1.0 08/05/2021       Lab Results   Component Value Date    WBC 4.58 12/14/2021    HGB 14.1 12/14/2021    HCT 44.3 12/14/2021    MCV 96 12/14/2021     12/14/2021      Lab Results   Component Value Date     (H) 12/14/2021     12/14/2021    K 3.5 12/14/2021    CL 98 12/14/2021    CO2 32 (H) 12/14/2021    BUN 59 (H) 12/14/2021    CREATININE 1.3 12/14/2021    CALCIUM 8.6 (L) 12/14/2021    MG 2.1 05/31/2019    ALT 18 12/14/2021    AST 20 12/14/2021    ALBUMIN 2.7 (L) 12/14/2021    BILITOT 0.4 12/14/2021     ASSESSMENT/PLAN:     63 y/o M with CKD III and combined systolic/diastolic CHF complicated by recurrent abdominal distension and pain 2/2 recurrent painful, tense ascites requiring frequent therapeutic large-volume paracentesis.        1. Recurrent painful, tense ascites - Will attempt US-guided percutaneous RUQ-approacchtherapeutic LVP with  local anesthetic only and albumin infusion post PRN as indicated per institutional protocol.      Risks (including, but not limited to, pain, bleeding, infection, damage to nearby structures, failure to obtain sufficient material for a diagnosis, the need for additional procedures, and death), benefits, and alternatives were discussed with the patient. All questions were answered to the best of my abilities. The patient wishes to proceed with the procedure. Written informed consent was obtained.     Thank you for considering IR for the care of your patient.      Zach Cha MD  Interventional Radiology

## 2022-03-31 NOTE — TELEPHONE ENCOUNTER
This Rx Request does not qualify for assessment with the [facility]   Please review protocol details and the Care Due Message for extra clinical information    Reasons Rx Request may be deferred:  Labs/Vitals overdue  Pt due for OV with PCP    Note composed:6:50 PM 03/31/2022

## 2022-04-04 PROBLEM — R18.8 CIRRHOSIS OF LIVER WITH ASCITES: Status: ACTIVE | Noted: 2022-01-01

## 2022-04-04 PROBLEM — I50.21 ACUTE SYSTOLIC CHF (CONGESTIVE HEART FAILURE): Status: ACTIVE | Noted: 2022-01-01

## 2022-04-04 PROBLEM — K74.60 CIRRHOSIS OF LIVER WITH ASCITES: Status: ACTIVE | Noted: 2022-01-01

## 2022-04-04 PROBLEM — S72.90XA FRACTURE OF FEMUR: Status: ACTIVE | Noted: 2022-01-01

## 2022-04-04 PROBLEM — E87.6 HYPOKALEMIA: Status: ACTIVE | Noted: 2022-01-01

## 2022-04-04 PROBLEM — D63.8 ANEMIA OF CHRONIC DISEASE: Status: ACTIVE | Noted: 2022-01-01

## 2022-04-04 NOTE — ED TRIAGE NOTES
Pt reports falling off of the toilet while attempting to get up. Denies loc. +left hip pain. Right bka noted; Pt uses wheelchair at home

## 2022-04-04 NOTE — PLAN OF CARE
Plan of care reviewed with pt and family, any questions or concerns addressed. Pt and family verbalized understanding. Vital signs stable. Medicated per MAR. Pt free of falls/injuries, bed in lowest position with bed locked, side rails up x2, call light and belongings within reach. Will continue to monitor throughout shift.

## 2022-04-04 NOTE — HPI
Patient is a very pleasant 62-year-old man.  Significant medical problems including un revascularized triple-vessel coronary artery disease which is being managed medically.  As per notes from his cardiologist Dr. Romano, he was turned down for surgery.  Patient has limited exercise tolerance caused by his right BKA and walks with the help of a walker and prosthesis.  Fell down today leading to fracture.  Denies any resting chest pains.  Does not exert much because of amputation, walking with the help of a walker etc..  Also has known ischemic cardiomyopathy with last EF of 20%.  In 2018.  Last stress test was in 2019 which showed predominantly fixed inferior wall defect.  Needs to go for urgent orthopedic surgery tomorrow.  Currently euvolemic on exam.  Laying flat in bed without orthopnea, PND, swelling of feet.        Cardiovascular Testing:  LE art US/SIDRA 5/3/21  1. 1. Right lower extremity arterial ultrasound shows no hemodynamically significant stenosis.  Pressures indicate moderate arterial occlusive disease.  2. Left lower extremity arterial ultrasound shows no hemodynamically significant stenosis.  Pressures indicate moderate arterial occlusive disease.     SIDRA 10/23/20  1. Right lower extremity pressures and waveforms indicate no hemodynamically significant arterial occlusive disease.  The DP ankle pressure is non compressible indicating medial calcinosis.  2. Left lower extremity pressures and waveforms indicate no hemodynamically significant arterial occlusive disease.  The DP ankle pressure is non compressible indicating medial calcinosis.     LE art US/SIDRA 10/23/20  1. Right lower extremity arterial ultrasound shows approximately 50% TP trunk stenosis.  Pressures indicate no arterial occlusive disease.  2. Left lower extremity arterial ultrasound shows no hemodynamically significant stenosis.  Pressures indicate no arterial occlusive disease.     LE venous US/reflux 8/9/19  No evidence of lower  extremity DVT bilaterally.  R GSV reflux (below knee).   vein noted below knee.  L GSV reflux (below knee).   vein noted below knee.  L Giacomini vein reflux.     Echo 12/11/18  Severely decreased left ventricular systolic function. The estimated ejection fraction is 20%  Severe global hypokinetic wall motion. Cannot exclude RWMA.  Septal wall has abnormal motion. Systolic and diastolic flattening of the interventricular septum consistent with right ventricle pressure and volume overload.  Left ventricular diastolic dysfunction.  Mild right ventricular enlargement.  Mildly to moderately reduced right ventricular systolic function.  Moderate tricuspid regurgitation.  There is a large left pleural effusion.     L MPI 10/17/19  Abnormal myocardial perfusion study  There is a moderate amount of infarct in the inferior wall(s).In the typical distribution of the RCA territory.  Post Stress Ejection Fraction is 17 %     Cath 5/6/16 (images prev personally reviewed and interpreted) (subsequently seen by Dr. Mathews 5/12/16 and deemed to not be a surgical candidate)  B. Summary/Post-Operative Diagnosis    Three vessel coronary artery disease.    LV systolic and diastolic dysfunction.  D. Hemodynamic Results  LVEDP (Pre): 25 mmHg  LVEDP (Post): 30 mmHg  Ejection Fraction: 35%  E. Angiographic Results       Patient has a right dominant coronary artery.      - Left Main Coronary Artery:             The LM has luminal irregularities. There is BAKARI 3 flow.     - Left Anterior Descending Artery:             The proximal LAD has a 80% stenosis. There is BAKARI 3 flow.             The mid LAD has a 90% stenosis. There is BAKARI 3 flow. The remaining portion of the vessel is diffusely diseased.     - Left Circumflex Artery:             The mid LCX has a 60% stenosis. There is BAKARI 3 flow. The remaining portion of the vessel has luminal irregularities.     - Right Coronary Artery:             The proximal RCA has a 60%  stenosis. There is BAKARI 3 flow.             The mid RCA has a 80% stenosis. There is BAKARI 3 flow. The remaining portion of the vessel has luminal irregularities.     - Posterior Descending Artery:             The proximal PDA has a 60% stenosis. There is BAKARI 3 flow. The remaining portion of the vessel has luminal irregularities.     - Radial:             The radial.  G. Recommendations  1. Routine post-cath care.  2. Risk factor reductions.  3. ASA 81mg.  4. Statin therapy.  5. Consult CV Surgery.

## 2022-04-04 NOTE — ANESTHESIA PREPROCEDURE EVALUATION
04/04/2022     Marvin Ray is a 62 y.o., male HTN, CAD/ICM, CKD3, Systolic/Diastolic HF with EF 20% (ICD in place) and severe PAD. Pt had non-syncopal fall today resulting in left femur fracture. Ortho plans for IM nailing. CHF exacerbation with BNP >2000 on admit.     ICD in place -- Not Pacer Dependent  Multiple anesthetics in past. No complications.       Per prior pre-op assessment:  [employer] ICD    Model # [id number]  Serial # [id number]  Item # [id number]        NPO >8 instruction  METS 1-3; wheelchair, walker    Past Medical History:  Past Medical History:   Diagnosis Date    Arthritis     Cellulitis     CKD (chronic kidney disease), stage III     Coronary artery disease     Diabetes mellitus     Diabetic retinopathy     Diabetic ulcer of left foot     Glaucoma     Gout     Hyperlipemia     Hypertension     ICD (implantable cardioverter-defibrillator) in place 11/02/2018    Left chest    Non-pressure chronic ulcer of other part of left foot with fat layer exposed 10/23/2018    PVD (peripheral vascular disease)     Type 2 diabetes mellitus with left diabetic foot ulcer 10/29/2018    Unsteady gait     uses a wheelchair       Past Surgical History:  Past Surgical History:   Procedure Laterality Date    AHMED GLAUCOMA IMPLANT Left 2011    DONE AT [facility]    AORTOGRAPHY WITH SERIALOGRAPHY Left 4/30/2019    Procedure: AORTOGRAM, WITH SERIALOGRAPHY, LEFT LOWER EXTREMITY, POSSIBLE INTERVENTION;  Surgeon: Mitch Chan MD;  Location: [facility] OR;  Service: Vascular;  Laterality: Left;  RN PRE OP 4-23-19.  NO H & P, No Cosent  [place]    AORTOGRAPHY WITH SERIALOGRAPHY Right 10/6/2020    Procedure: AORTOGRAM, WITH SERIALOGRAPHY, RIGHT LOWER EXTREMITY ANGIOGRAM, POSSIBLE INTERVENTION;  Surgeon: Mitch Chan MD;  Location: [facility] OR;  Service: Vascular;  Laterality: Right;   RN PREOP 10/1/2020--COVID NEGATIVE ON 10/5    AORTOGRAPHY WITH SERIALOGRAPHY Right 1/13/2021    Procedure: AORTOGRAM, WITH RIGHT LOWER EXTREMITY ANGIOGRAM, POSSIBLE INTERVENTION;  Surgeon: Mitch Chan MD;  Location: [facility] OR;  Service: Vascular;  Laterality: Right;  1PM START----NEED CONSENT  RN PREOP 1/8/2021--COVID NEGATIVE ON 1/12    AORTOGRAPHY WITH SERIALOGRAPHY Right 1/26/2021    Procedure: AORTOGRAM, RIGHT LOWER EXTREMITY ANGIOGRAM, POSSIBLE INTERVENTION;  Surgeon: Mitch Chan MD;  Location: [facility] OR;  Service: Vascular;  Laterality: Right;  RN PREOP 1/25/2021, COVID NEGATIVE ON 1/25, cxr, and ecg done    AORTOGRAPHY WITH SERIALOGRAPHY Right 3/30/2021    Procedure: AORTOGRAM, WITH RIGHT LOWER EXTREMITY ANGIOGRAM, POSSIBLE INTERVENTION;  Surgeon: Mitch Chan MD;  Location: [facility] OR;  Service: Vascular;  Laterality: Right;  RN PREOP 3/26/2021  COVID NEGATIVE    BAERVELDT GLAUCOMA IMPLANT Left 2012    WITH CATARACT EXTRACTION//DONE AT [facility]    BELOW KNEE AMPUTATION OF LOWER EXTREMITY Right 5/17/2021    Procedure: AMPUTATION, BELOW KNEE;  Surgeon: Christiana Pritchett MD;  Location: [facility] OR;  Service: Orthopedics;  Laterality: Right;  WOUND VAC -PREVENA  SUPINE---HAS--ICD- Iast interrogation -3/21  AMPUTATION TRAY  1ST CASE OKWHITNEY WHITLOCK  RN PREOP 5/13/2021     COVID --NEGATIVE ON 5/14---INCOMPLETE H/P    BIOPSY Right 12/18/2020    Procedure: Biopsy- trocar biopsy;  Surgeon: Brianna Champion DPM;  Location: [facility] OR;  Service: Podiatry;  Laterality: Right;    CARDIAC CATHETERIZATION Left 05/2016    CARDIAC DEFIBRILLATOR PLACEMENT Left 11/02/2018    CATARACT EXTRACTION W/  INTRAOCULAR LENS IMPLANT Left 2012    WITH BAERVEDT (DONE AT [facility])    CATARACT EXTRACTION W/  INTRAOCULAR LENS IMPLANT Right 09/26/2018    COMPLEX ()    CB DESTRUCTION WITH CYCLO G6 Left 02/15/2017        CYST REMOVAL      DEBRIDEMENT OF FOOT  12/28/2018    Procedure: DEBRIDEMENT,  FOOT;  Surgeon: Mitch Wall MD;  Location: [facility] OR;  Service: Vascular;;    DEBRIDEMENT OF FOOT  6/5/2019    Procedure: DEBRIDEMENT, FOOT;  Surgeon: Mitch Wall MD;  Location: [facility] OR;  Service: Vascular;;    DEBRIDEMENT OF FOOT Right 3/5/2021    Procedure: DEBRIDEMENT, FOOT with Misonic device;  Surgeon: Mitch Wall MD;  Location: [facility] OR;  Service: Vascular;  Laterality: Right;    EXAMINATION UNDER ANESTHESIA Left 12/5/2018    Procedure: Exam under anesthesia, left foot debridement, washout and all other indicated procedures;  Surgeon: Mitch Wall MD;  Location: [facility] OR;  Service: Vascular;  Laterality: Left;  1030AM START  RN PREOP 12/3/2018-----AICD  SEEN BY DR JAMES 12/4    EXAMINATION UNDER ANESTHESIA Left 2/13/2019    Procedure: LEFT FOOT WOUND DEBRIDEMENT, WASHOUT AND ALL OTHER INDICATED PROCEDURES;  Surgeon: Mitch Wall MD;  Location: [facility] OR;  Service: Vascular;  Laterality: Left;  requesting 1st case start  THIS CASE 1ST PER DR WALL ON 2/1/19 @ 844AM -LO  RN PREOP 2/6/2019  HAS CARDIAC CLEARANCE    EXAMINATION UNDER ANESTHESIA Left 12/28/2018    Procedure: Exam under anesthesia, left foot debridement, left foot washout, possible left second through fifth metatarsal resection;  Surgeon: Mitch Wall MD;  Location: [facility] OR;  Service: Vascular;  Laterality: Left;  1:00pm start per julissa @ 8:58am  RN  PHONE PRE OP 12-27-18--=Pt coming [facility] [address] [facility]  NEED CONSENT    EXAMINATION UNDER ANESTHESIA Left 6/5/2019    Procedure: LEFT FOOT DEBRIDEMENT, WASHOUT AND ALL OTHER INDICATED PROCEDURES;  Surgeon: Mitch Wall MD;  Location: [facility] OR;  Service: Vascular;  Laterality: Left;  RN PRE OP 5-31-19------ICD------NEED CONSENT    EXAMINATION UNDER ANESTHESIA Right 11/9/2020    Procedure: RIGHT FOOT DEBRIDEMENT, WASHOUT AND ALL OTHER INDICATED PROCEDURES;  Surgeon: Mitch Wall MD;  Location: [facility] OR;  Service: Vascular;   Laterality: Right;  RN PREOP 10/27/2020, --COVID NEGATIVE ON 11/6, has cardiac clearance/[place] 10/27/2020, pt has ICD  NEEDS ORDERS    EXAMINATION UNDER ANESTHESIA Right 2/4/2021    Procedure: RIGHT FOOT DEBRIDEMENT, POSSIBLE CALCANECTOMY, POSSIBLE FIFTH TOE AMPUTATION, WASHOUT AND ALL OTHER INDICATED PROCEDURES;  Surgeon: Mitch Chan MD;  Location: [facility] OR;  Service: Vascular;  Laterality: Right;  RN PREOP 2/2/2021--COVID NEGATIVE ON 2/2  ICD    EYE SURGERY      CAT EXT OU    FOOT AMPUTATION THROUGH METATARSAL Right 3/5/2021    Procedure: Right foot wound debridement, possible transmetatarsal amputation, possible fifth toe amputation, washout;  Surgeon: Mitch Chan MD;  Location: [facility] OR;  Service: Vascular;  Laterality: Right;  MISONIX SONIC ONE JAYDEN LOUISE [phone] SPOKE TO HIM ON MY OFFICE @ 7:31AM ON 2-  RN PRE Op, Covid NEGATIVE ON  3-2-21.  C A  8:30AM START----NEED H/P    INCISION AND DRAINAGE Left 11/14/2018    Procedure: Incision and Drainage, left lower extremity debridement, washout;  Surgeon: Mitch Chan MD;  Location: [facility] OR;  Service: Vascular;  Laterality: Left;    INCISION AND DRAINAGE Left 11/16/2018    Procedure: INCISION AND DRAINAGE;  Surgeon: Mitch Chan MD;  Location: [facility] OR;  Service: Vascular;  Laterality: Left;    INCISION AND DRAINAGE Right 11/18/2020    Procedure: Debridement and washout right lower extremity wounds;  Surgeon: Mitch Chan MD;  Location: [facility] OR 2ND FLR;  Service: Vascular;  Laterality: Right;    INCISION AND DRAINAGE Right 1/27/2021    Procedure: RIGHT FOOT DEBRIDEMENT, WASHOUT AND ALL OTHER INDICATED PROCEDURES;  Surgeon: Mitch Chan MD;  Location: [facility] OR 2ND FLR;  Service: Vascular;  Laterality: Right;    INTRAOCULAR PROSTHESES INSERTION Right 9/26/2018    Procedure: INSERTION, IOL PROSTHESIS;  Surgeon: Perla Cortés MD;  Location: [facility] OR 1ST FLR;  Service: Ophthalmology;   Laterality: Right;    PERITONEOCENTESIS N/A 3/1/2019    Procedure: PARACENTESIS, ABDOMINAL, INITIAL;  Surgeon: Dosc Diagnostic Provider;  Location: [facility] OR;  Service: Radiology;  Laterality: N/A;    PERITONEOCENTESIS N/A 3/25/2019    Procedure: PARACENTESIS, ABDOMINAL, INITIAL;  Surgeon: Dosc Diagnostic Provider;  Location: [facility] OR;  Service: Radiology;  Laterality: N/A;    PERITONEOCENTESIS N/A 7/22/2019    Procedure: PARACENTESIS, ABDOMINAL, INITIAL;  Surgeon: Dosc Diagnostic Provider;  Location: [facility] OR;  Service: Radiology;  Laterality: N/A;    PERITONEOCENTESIS N/A 8/16/2019    Procedure: PARACENTESIS, ABDOMINAL, INITIAL;  Surgeon: Dosc Diagnostic Provider;  Location: [facility] OR;  Service: Radiology;  Laterality: N/A;    PERITONEOCENTESIS N/A 9/26/2019    Procedure: PARACENTESIS, ABDOMINAL;  Surgeon: Dosc Diagnostic Provider;  [facility];  Service: Radiology;  Laterality: N/A;    PERITONEOCENTESIS N/A 10/11/2019    Procedure: PARACENTESIS, ABDOMINAL;  Surgeon: Dosc Diagnostic Provider;  [facility];  Service: Radiology;  Laterality: N/A;    PERITONEOCENTESIS N/A 10/31/2019    Procedure: PARACENTESIS, ABDOMINAL;  Surgeon: Dosc Diagnostic Provider;  [facility];  Service: Radiology;  Laterality: N/A;    PERITONEOCENTESIS N/A 11/18/2019    Procedure: PARACENTESIS, ABDOMINAL;  Surgeon: Dosc Diagnostic Provider;  [facility];  Service: Radiology;  Laterality: N/A;    PERITONEOCENTESIS N/A 12/16/2019    Procedure: PARACENTESIS, ABDOMINAL;  Surgeon: Dosc Diagnostic Provider;  Location: [facility] OR;  Service: Radiology;  Laterality: N/A;  2PM START    PERITONEOCENTESIS N/A 2/7/2020    Procedure: PARACENTESIS, ABDOMINAL;  Surgeon: Dosc Diagnostic Provider;  Location: [facility] OR;  Service: Radiology;  Laterality: N/A;  REQUESTED 10:30A START    PERITONEOCENTESIS N/A 2/19/2020    Procedure: PARACENTESIS, ABDOMINAL;  Surgeon: Dosc Diagnostic Provider;  Location: [facility] OR;  Service: Radiology;   Laterality: N/A;    PERITONEOCENTESIS N/A 2/28/2020    Procedure: PARACENTESIS, ABDOMINAL;  Surgeon: Dosc Diagnostic Provider;  Location: [facility] OR;  Service: Radiology;  Laterality: N/A;  REQUESTED 10AM START    PHACOEMULSIFICATION OF CATARACT Right 9/26/2018    Procedure: PHACOEMULSIFICATION, CATARACT;  Surgeon: Perla Cortés MD;  Location: [facility] OR [address] [place] [address];  Service: Ophthalmology;  Laterality: Right;    REPEAT ABDOMINAL PARACENTESIS N/A 2/11/2019    Procedure: PARACENTESIS, ABDOMINAL, REPEAT;  Surgeon: Dosc Diagnostic Provider;  Location: [facility] OR;  Service: Radiology;  Laterality: N/A;  1PM START    REPEAT ABDOMINAL PARACENTESIS N/A 9/12/2019    Procedure: PARACENTESIS, ABDOMINAL, REPEAT;  Surgeon: Dosc Diagnostic Provider;  Location: [facility] OR;  Service: Radiology;  Laterality: N/A;  9AM START    REPEAT ABDOMINAL PARACENTESIS N/A 12/2/2019    Procedure: PARACENTESIS, ABDOMINAL, REPEAT;  Surgeon: Dosc Diagnostic Provider;  Location: [facility] OR;  Service: Radiology;  Laterality: N/A;  3PM START    REPEAT ABDOMINAL PARACENTESIS N/A 1/10/2020    Procedure: PARACENTESIS, ABDOMINAL, REPEAT;  Surgeon: Dosc Diagnostic Provider;  Location: [facility] OR;  Service: Radiology;  Laterality: N/A;  1130AM START    REPEAT ABDOMINAL PARACENTESIS N/A 1/20/2020    Procedure: PARACENTESIS, ABDOMINAL, REPEAT;  Surgeon: Dosc Diagnostic Provider;  Location: [facility] OR;  Service: Radiology;  Laterality: N/A;  1PM START    REPEAT ABDOMINAL PARACENTESIS N/A 1/31/2020    Procedure: PARACENTESIS, ABDOMINAL, REPEAT;  Surgeon: Dosc Diagnostic Provider;  Location: [facility] OR;  Service: Radiology;  Laterality: N/A;  10AM START    REPEAT ABDOMINAL PARACENTESIS N/A 3/16/2020    Procedure: PARACENTESIS, ABDOMINAL, REPEAT;  Surgeon: Dosc Diagnostic Provider;  Location: [facility] OR;  Service: Radiology;  Laterality: N/A;  10AM START    REPEAT ABDOMINAL PARACENTESIS N/A 3/30/2020    Procedure: PARACENTESIS, ABDOMINAL, REPEAT;  Surgeon: Dosc Diagnostic  Provider;  Location: [facility] OR;  Service: Radiology;  Laterality: N/A;    REPEAT ABDOMINAL PARACENTESIS N/A 4/9/2020    Procedure: PARACENTESIS, ABDOMINAL, REPEAT;  Surgeon: Dosc Diagnostic Provider;  Location: [facility] OR;  Service: Radiology;  Laterality: N/A;  1130AM START    REPEAT ABDOMINAL PARACENTESIS N/A 5/8/2020    Procedure: PARACENTESIS, ABDOMINAL, REPEAT;  Surgeon: Dosc Diagnostic Provider;  Location: [facility] OR;  Service: Radiology;  Laterality: N/A;  9AM START    REPEAT ABDOMINAL PARACENTESIS N/A 5/21/2020    Procedure: PARACENTESIS, ABDOMINAL, REPEAT;  Surgeon: Dos Diagnostic Provider;  Location: [facility] OR;  Service: Radiology;  Laterality: N/A;  10AM START    REPEAT ABDOMINAL PARACENTESIS N/A 6/5/2020    Procedure: PARACENTESIS, ABDOMINAL, REPEAT;  Surgeon: Dos Diagnostic Provider;  Location: [facility] OR;  Service: Radiology;  Laterality: N/A;  NOON START    REPEAT ABDOMINAL PARACENTESIS N/A 7/10/2020    Procedure: PARACENTESIS, ABDOMINAL, REPEAT;  Surgeon: Dos Diagnostic Provider;  Location: [facility] OR;  Service: Radiology;  Laterality: N/A;  1PM START    REPEAT ABDOMINAL PARACENTESIS N/A 8/20/2020    Procedure: PARACENTESIS, ABDOMINAL, REPEAT;  Surgeon: Dos Diagnostic Provider;  Location: [facility] OR;  Service: Radiology;  Laterality: N/A;  1PM START    REPEAT ABDOMINAL PARACENTESIS N/A 9/4/2020    Procedure: PARACENTESIS, ABDOMINAL, REPEAT;  Surgeon: Dosc Diagnostic Provider;  Location: [facility] OR;  Service: Radiology;  Laterality: N/A;  11 AM START    REPEAT ABDOMINAL PARACENTESIS N/A 9/16/2020    Procedure: PARACENTESIS, ABDOMINAL, REPEAT;  Surgeon: Dosc Diagnostic Provider;  Location: [facility] OR;  Service: Radiology;  Laterality: N/A;  START 2:00 P.M.    REPEAT ABDOMINAL PARACENTESIS N/A 9/28/2020    Procedure: PARACENTESIS, ABDOMINAL, REPEAT;  Surgeon: Dosc Diagnostic Provider;  Location: [facility] OR;  Service: Radiology;  Laterality: N/A;  1 P.M. START    REPEAT ABDOMINAL PARACENTESIS N/A 11/3/2020     Procedure: PARACENTESIS, ABDOMINAL, REPEAT;  Surgeon: Dosc Diagnostic Provider;  Location: [facility] OR;  Service: Radiology;  Laterality: N/A;  11AM START    REPEAT ABDOMINAL PARACENTESIS N/A 11/13/2020    Procedure: PARACENTESIS, ABDOMINAL, REPEAT;  Surgeon: Dosc Diagnostic Provider;  [facility]: [facility];  Service: Radiology;  Laterality: N/A;  12PM START    REPEAT ABDOMINAL PARACENTESIS N/A 11/23/2020    Procedure: PARACENTESIS, ABDOMINAL, REPEAT;  Surgeon: Dosc Diagnostic Provider;  Location: [facility] OR;  Service: Radiology;  Laterality: N/A;    REPEAT ABDOMINAL PARACENTESIS N/A 12/1/2020    Procedure: PARACENTESIS, ABDOMINAL, REPEAT;  Surgeon: Dosc Diagnostic Provider;  [facility];  Service: Radiology;  Laterality: N/A;  11AM START    REPEAT ABDOMINAL PARACENTESIS N/A 12/8/2020    Procedure: PARACENTESIS, ABDOMINAL, REPEAT;  Surgeon: Dosc Diagnostic Provider;  Location: [facility] OR;  Service: Radiology;  Laterality: N/A;  93AM START    REPEAT ABDOMINAL PARACENTESIS N/A 12/17/2020    Procedure: PARACENTESIS, ABDOMINAL, REPEAT;  Surgeon: Dosc Diagnostic Provider;  Location: [facility] OR;  Service: Radiology;  Laterality: N/A;  10AM START    REPEAT ABDOMINAL PARACENTESIS N/A 12/30/2020    Procedure: PARACENTESIS, ABDOMINAL, REPEAT;  Surgeon: Dos Diagnostic Provider;  Location: [facility] OR;  Service: Radiology;  Laterality: N/A;  9 A.M. START    REPEAT ABDOMINAL PARACENTESIS N/A 1/12/2021    Procedure: PARACENTESIS, ABDOMINAL, REPEAT;  Surgeon: Dos Diagnostic Provider;  Location: [facility] OR;  Service: Radiology;  Laterality: N/A;  10 AM START    REPEAT ABDOMINAL PARACENTESIS N/A 2/10/2021    Procedure: PARACENTESIS, ABDOMINAL, REPEAT;  Surgeon: Dosc Diagnostic Provider;  [facility];  Service: Radiology;  Laterality: N/A;  2 P.M. START    REPEAT ABDOMINAL PARACENTESIS N/A 2/18/2021    Procedure: PARACENTESIS, ABDOMINAL, REPEAT;  Surgeon: Dosc Diagnostic Provider;  [facility]: [facility];  Service: Radiology;  Laterality:  N/A;  230PM START    REPEAT ABDOMINAL PARACENTESIS N/A 2/26/2021    Procedure: PARACENTESIS, ABDOMINAL, REPEAT;  Surgeon: Dosc Diagnostic Provider;  Location: [facility] OR;  Service: Radiology;  Laterality: N/A;    REPEAT ABDOMINAL PARACENTESIS N/A 3/4/2021    Procedure: PARACENTESIS, ABDOMINAL, REPEAT;  Surgeon: Dosc Diagnostic Provider;  Location: [facility] OR;  Service: Radiology;  Laterality: N/A;  9AM START    REPEAT ABDOMINAL PARACENTESIS N/A 3/12/2021    Procedure: PARACENTESIS, ABDOMINAL, REPEAT;  Surgeon: Dosc Diagnostic Provider;  Location: [facility] OR;  Service: Radiology;  Laterality: N/A;  10:00 START TIME  NO PRE-OP REQUIRED    REPEAT ABDOMINAL PARACENTESIS N/A 4/7/2021    Procedure: PARACENTESIS, ABDOMINAL, REPEAT;  Surgeon: Dosc Diagnostic Provider;  Location: [facility] OR;  Service: Radiology;  Laterality: N/A;  1 P.M. START    REPEAT ABDOMINAL PARACENTESIS N/A 4/16/2021    Procedure: PARACENTESIS, ABDOMINAL, REPEAT;  Surgeon: Dosc Diagnostic Provider;  Location: [facility] OR;  Service: Radiology;  Laterality: N/A;  9:OO START  NO PRE-OP REQ'D    REPEAT ABDOMINAL PARACENTESIS N/A 4/26/2021    Procedure: PARACENTESIS, ABDOMINAL, REPEAT;  Surgeon: Dosc Diagnostic Provider;  Location: [facility] OR;  Service: Radiology;  Laterality: N/A;  9AM START    REPEAT ABDOMINAL PARACENTESIS N/A 5/6/2021    Procedure: PARACENTESIS, ABDOMINAL, REPEAT;  Surgeon: Dosc Diagnostic Provider;  Location: [facility] OR;  Service: Radiology;  Laterality: N/A;  3PM START    REPEAT ABDOMINAL PARACENTESIS N/A 5/14/2021    Procedure: PARACENTESIS, ABDOMINAL, REPEAT;  Surgeon: Dosc Diagnostic Provider;  Location: [facility] OR;  Service: Radiology;  Laterality: N/A;  1:00PM START  NO PRE-OP REQ'D    REPEAT ABDOMINAL PARACENTESIS N/A 5/24/2021    Procedure: PARACENTESIS, ABDOMINAL, REPEAT;  Surgeon: Dosc Diagnostic Provider;  Location: [facility] OR;  Service: Radiology;  Laterality: N/A;  2 P.M START TIME    REPEAT ABDOMINAL PARACENTESIS N/A 6/4/2021    Procedure:  PARACENTESIS, ABDOMINAL, REPEAT;  Surgeon: [facility] Diagnostic Provider;  Location: [facility] OR;  Service: Radiology;  Laterality: N/A;  11AM START    REPEAT ABDOMINAL PARACENTESIS N/A 2021    Procedure: PARACENTESIS, ABDOMINAL, REPEAT;  Surgeon: [facility] Diagnostic Provider;  Location: [facility] OR;  Service: Radiology;  Laterality: N/A;  8AM START    REPEAT ABDOMINAL PARACENTESIS N/A 2021    Procedure: PARACENTESIS, ABDOMINAL, REPEAT;  Surgeon: [facility] Diagnostic Provider;  Location: [facility] OR;  Service: Radiology;  Laterality: N/A;    REVISION OF PROCEDURE INVOLVING GLAUCOMA DRAINAGE DEVICE Left 10/2/2019    EXTRUDING BAERVELDT /WITH PERICARDIAL PATCH GRAFT ()    Right foot surgery  10/2014    TOE AMPUTATION Right     first and second    TOE AMPUTATION Left 2018    Procedure: AMPUTATION, TOES  2-5;  Surgeon: Mitch Chan MD;  Location: [facility] OR;  Service: Vascular;  Laterality: Left;    TOE AMPUTATION Left 2018    Procedure: AMPUTATION, TOE;  Surgeon: Mitch Chan MD;  Location: [facility] OR;  Service: Vascular;  Laterality: Left;  left great toe    TONSILLECTOMY         Social History:  Social History     Socioeconomic History    Marital status: Single    Years of education: 14   Tobacco Use    Smoking status: Former Smoker     Packs/day: 1.00     Years: 3.00     Pack years: 3.00     Types: Cigarettes     Quit date: 1984     Years since quittin.7    Smokeless tobacco: Never Used   Substance and Sexual Activity    Alcohol use: Not Currently     Alcohol/week: 1.0 standard drink     Types: 1 Cans of beer per week     Comment: rarely    Drug use: No    Sexual activity: Not Currently     Partners: Female       Medications:  Current Facility-Administered Medications on File Prior to Encounter   Medication Dose Route Frequency Provider Last Rate Last Admin    lactated ringers infusion   Intravenous Continuous Tam Reynolds MD   New Bag at 20 0647    lidocaine (PF)  10 mg/ml (1%) injection 10 mg  1 mL Intradermal Once Tam Reynolds MD         Current Outpatient Medications on File Prior to Encounter   Medication Sig Dispense Refill    allopurinoL (ZYLOPRIM) 300 MG tablet Take 1 tablet (300 mg total) by mouth once daily. 30 tablet 3    aspirin (ECOTRIN) 81 MG EC tablet Take 81 mg by mouth once daily.      carvediloL (COREG) 3.125 MG tablet Take 1 tablet (3.125 mg total) by mouth 2 (two) times daily with meals. 180 tablet 1    ezetimibe (ZETIA) 10 mg tablet TAKE 1 TABLET(10 MG) BY MOUTH EVERY DAY 90 tablet 1    fish oil-omega-3 fatty acids 300-1,000 mg capsule Take 1 capsule by mouth 2 (two) times daily. 60 capsule 3    gabapentin (NEURONTIN) 100 MG capsule TAKE 2 CAPSULES(200 MG) BY MOUTH EVERY EVENING 60 capsule 11    torsemide (DEMADEX) 20 MG Tab TAKE 4 TABLETS(80 MG) BY MOUTH TWICE DAILY 240 tablet 2    acetaminophen (TYLENOL) 325 MG tablet Take 650 mg by mouth every 6 (six) hours as needed for Pain.      blood sugar diagnostic Strp To check BG 2 times daily, to use with insurance preferred meter. e11.65 200 each 3    blood-glucose meter kit To check BG 2 times daily, to use with insurance preferred meter 1 each 0    brimonidine 0.2% (ALPHAGAN) 0.2 % Drop INSTILL 1 DROP IN BOTH EYES THREE TIMES DAILY 10 mL 12    clopidogreL (PLAVIX) 75 mg tablet Take 1 tablet (75 mg total) by mouth once daily. 30 tablet 11    coenzyme Q10 100 mg capsule Take 100 mg by mouth every morning.      collagenase (SANTYL) ointment Apply topically to diabetic ulcer on lower right leg once daily. 90 g 3    dorzolamide-timolol 2-0.5% (COSOPT) 22.3-6.8 mg/mL ophthalmic solution Place 1 drop into both eyes 3 (three) times daily. 30 mL 3    HYDROcodone-acetaminophen (NORCO) 5-325 mg per tablet Take 1 tablet by mouth every 12 (twelve) hours as needed for Pain. 60 tablet 0    insulin degludec-liraglutide (XULTOPHY 100/3.6) 100 unit-3.6 mg /mL (3 mL) Pen Inject 35 Units into the skin once  "daily. 15 mL 5    lancets Misc To check BG 2 times daily, to use with insurance preferred meter. Dx code e11.65 200 each 3    loratadine (CLARITIN) 10 mg tablet Take 10 mg by mouth daily as needed for Allergies.      metOLazone (ZAROXOLYN) 2.5 MG tablet Take 1 tablet (2.5 mg total) by mouth once daily. 90 tablet 0    MULTIVIT,THER IRON,CA,FA & MIN (MULTIVITAMIN) Tab Take 1 tablet by mouth every morning.       pen needle, diabetic (BD ULTRA-FINE AMADOR PEN NEEDLE) 32 gauge x 5/32" Ndle 1 Device by Misc.(Non-Drug; Combo Route) route 2 (two) times a day. 100 each 11       Allergies:  Review of patient's allergies indicates:   Allergen Reactions    Statins-hmg-coa reductase inhibitors      Generalized Pain    Onglyza [saxagliptin]     Penicillins Rash       Active Problems:  Patient Active Problem List   Diagnosis    Epiretinal membrane, both eyes    Nuclear sclerotic cataract of right eye    Pseudophakia, left eye    Ptosis, left eyelid    DM type 2 with diabetic peripheral neuropathy    Mixed hyperlipidemia    Neovascular glaucoma of both eyes    Tophaceous gout    Essential hypertension    Coronary artery disease involving native coronary artery of native heart without angina pectoris    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 edema, with long-term current use of insulin    Neovascular glaucoma of left eye, severe stage    Statin myopathy    Neovascular glaucoma, right eye, mild stage    PVD (peripheral vascular disease)    Right wrist pain    Hepatosplenomegaly    Type 2 diabetes mellitus with left diabetic foot ulcer    Other ascites    MDR Acinetobacter baumannii infection    Debility    Subacute osteomyelitis of left foot    Stage 3 chronic kidney disease    Atherosclerosis of left lower extremity with ulceration of midfoot    CKD stage 3 due to type 2 diabetes mellitus    Anemia due to multiple mechanisms    " Persistent proteinuria    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    Ischemic cardiomyopathy    Status post abdominal paracentesis    Diabetic ulcer of right lower leg    Diabetic ulcer of toe of right foot associated with type 2 diabetes mellitus, with bone involvement without evidence of necrosis    Diabetes mellitus due to underlying condition with foot ulcer, without long-term current use of insulin    Type 2 diabetes mellitus with right diabetic foot ulcer    Statin intolerance    Abnormal EKG    Atherosclerosis of native artery of right lower extremity with ulceration of ankle    Venous stasis ulcer with edema of lower leg    Atherosclerosis of native artery of right leg with ulceration    Open wound of right foot    Aortic atherosclerosis    ICD (implantable cardioverter-defibrillator), single, in situ    Dry gangrene    Wound of left foot    Chronic combined systolic and diastolic heart failure    BKA stump complication    Anemia of chronic disease    Acute systolic CHF (congestive heart failure)    Hypokalemia    Cirrhosis of liver with ascites    Fracture of femur       Diagnostic Studies:    CBC:  Recent Labs   Lab 04/05/22  0301   WBC 8.45   RBC 3.92*   HGB 12.7*   HCT 39.6*      *   MCH 32.4*   MCHC 32.1        CMP:  Recent Labs   Lab 04/05/22  0301   CALCIUM 7.8*   *   K 3.5   CO2 28   CL 93*   BUN 79*   CREATININE 2.4*     EKG (8/14/21):  Normal sinus rhythm   Anterolateral infarct (cited on or before 30-OCT-2018)     TTE (4/5/22):  · The estimated ejection fraction is 15%.  · Severe left atrial enlargement.  · The left ventricle is normal in size with severely decreased systolic function.  · Grade III left ventricular diastolic dysfunction.  · Mild mitral regurgitation.  · Mild tricuspid regurgitation.  · Elevated central venous pressure (15 mmHg).  · The estimated PA systolic pressure is 59 mmHg.  · There is pulmonary hypertension.  · Severe  right ventricular enlargement with moderately reduced right ventricular systolic function.  · Severe right atrial enlargement.    CXR (4/4/22):  Interstitial pulmonary edema and small right pleural effusion.    24 Hour Vitals:  Temp:  [36.7 °C (98.1 °F)-37.1 °C (98.7 °F)] 36.8 °C (98.2 °F)  Pulse:  [65-84] 65  Resp:  [16-20] 20  SpO2:  [92 %-98 %] 93 %  BP: ()/(48-61) 98/61   See Nursing Charting For Additional Vitals    Anesthesia Evaluation    I have reviewed the Patient Summary Reports.    I have reviewed the Nursing Notes. I have reviewed the NPO Status.   I have reviewed the Medications.     Review of Systems  Anesthesia Hx:  No problems with previous Anesthesia  History of prior surgery of interest to airway management or planning: Denies Family Hx of Anesthesia complications.   Denies Personal Hx of Anesthesia complications.   Social:  Former Smoker    Hematology/Oncology:     Oncology Normal    -- Anemia:   EENT/Dental:EENT/Dental Normal   Cardiovascular:   Exercise tolerance: poor Pacemaker Hypertension CAD  CABG/stent  CHF PVD hyperlipidemia ECG has been reviewed. Plavix: last dose 4/4/22   Pulmonary:  Pulmonary Normal interstial pulmonary edema   Renal/:   Chronic Renal Disease, CRI    Hepatic/GI:   Liver Disease, Chronic ascites; last paracentesis 3/31 with ~6L removed   Musculoskeletal:   Arthritis  Left femur fracture   Neurological:   Neuromuscular Disease,    Endocrine:   Diabetes, type 2, using insulin    Dermatological:  Skin Normal    Psych:  Psychiatric Normal           Physical Exam  General:  Well nourished      Airway/Jaw/Neck:  Airway Findings: Mouth Opening: Normal   Tongue: Normal   General Airway Assessment: Adult Mallampati: II  Improves to I with phonation.  TM Distance: Normal, at least 6 cm       Dental:  Dental Findings: Periodontal disease, Severe      Chest/Lungs:  Chest/Lungs Clear    Heart/Vascular:  Heart Findings: Normal       Mental Status:  Mental Status Findings:   Cooperative, Alert and Oriented         Anesthesia Plan  Type of Anesthesia, risks & benefits discussed:  Anesthesia Type:  Gen ETT    Patient's Preference:   Plan Factors:          Intra-op Monitoring Plan: Standard ASA Monitors, arterial line and central line  Intra-op Monitoring Plan Comments:   Post Op Pain Control Plan: multimodal analgesia and IV/PO Opioids PRN  Post Op Pain Control Plan Comments:     Induction:   IV  Beta Blocker:  Patient is on a Beta-Blocker and has received one dose within the past 24 hours (No further documentation required).       Informed Consent: Informed consent signed with the Patient and all parties understand the risks and agree with anesthesia plan.  All questions answered.  Anesthesia consent signed with patient.  ASA Score: 4     Day of Surgery Review of History & Physical:  There are no significant changes.  H&P Update referred to the surgeon/provider.    Anesthesia Plan Notes:   Patient with recent plavix administration/ASA. Severly reduced LVEF of 15%.  GA with OETT  Standard ASA monitors. Stephanie. CVC.  Recovery in PACU  PONV: 2        Ready For Surgery From Anesthesia Perspective.           Physical Exam  General: Well nourished    Airway:  Mallampati: II / I  Mouth Opening: Normal  TM Distance: Normal, at least 6 cm  Tongue: Normal    Dental:  Periodontal disease, Severe          Anesthesia Plan  Type of Anesthesia, risks & benefits discussed:    Anesthesia Type: Gen ETT  Intra-op Monitoring Plan: Standard ASA Monitors, arterial line and central line  Post Op Pain Control Plan: multimodal analgesia and IV/PO Opioids PRN  Induction:  IV  Airway Plan: Video and Direct, Post-Induction  Informed Consent: Informed consent signed with the Patient and all parties understand the risks and agree with anesthesia plan.  All questions answered. Patient consented to blood products? Yes  ASA Score: 4  Day of Surgery Review of History & Physical: H&P Update referred to the  surgeon/provider.  Anesthesia Plan Notes:   Patient with recent plavix administration/ASA. Severly reduced LVEF of 15%.  GA with OETT  Standard ASA monitors. Stephanie. CVC.  Recovery in PACU  PONV: 2    Ready For Surgery From Anesthesia Perspective.       .

## 2022-04-04 NOTE — SUBJECTIVE & OBJECTIVE
Past Medical History:   Diagnosis Date    Arthritis     Cellulitis     CKD (chronic kidney disease), stage III     Coronary artery disease     Diabetes mellitus     Diabetic retinopathy     Diabetic ulcer of left foot     Glaucoma     Gout     Hyperlipemia     Hypertension     ICD (implantable cardioverter-defibrillator) in place 11/02/2018    Left chest    Non-pressure chronic ulcer of other part of left foot with fat layer exposed 10/23/2018    PVD (peripheral vascular disease)     Type 2 diabetes mellitus with left diabetic foot ulcer 10/29/2018    Unsteady gait     uses a wheelchair       Past Surgical History:   Procedure Laterality Date    AHMED GLAUCOMA IMPLANT Left 2011    DONE AT [facility]    AORTOGRAPHY WITH SERIALOGRAPHY Left 4/30/2019    Procedure: AORTOGRAM, WITH SERIALOGRAPHY, LEFT LOWER EXTREMITY, POSSIBLE INTERVENTION;  Surgeon: Mitch Chan MD;  Location: [facility] OR;  Service: Vascular;  Laterality: Left;  RN PRE OP 4-23-19.  NO H & P, No Cosent  [place]    AORTOGRAPHY WITH SERIALOGRAPHY Right 10/6/2020    Procedure: AORTOGRAM, WITH SERIALOGRAPHY, RIGHT LOWER EXTREMITY ANGIOGRAM, POSSIBLE INTERVENTION;  Surgeon: Mitch Chan MD;  Location: [facility] OR;  Service: Vascular;  Laterality: Right;  RN PREOP 10/1/2020--COVID NEGATIVE ON 10/5    AORTOGRAPHY WITH SERIALOGRAPHY Right 1/13/2021    Procedure: AORTOGRAM, WITH RIGHT LOWER EXTREMITY ANGIOGRAM, POSSIBLE INTERVENTION;  Surgeon: Mitch Chan MD;  Location: [facility] OR;  Service: Vascular;  Laterality: Right;  1PM START----NEED CONSENT  RN PREOP 1/8/2021--COVID NEGATIVE ON 1/12    AORTOGRAPHY WITH SERIALOGRAPHY Right 1/26/2021    Procedure: AORTOGRAM, RIGHT LOWER EXTREMITY ANGIOGRAM, POSSIBLE INTERVENTION;  Surgeon: Mitch Chan MD;  Location: [facility] OR;  Service: Vascular;  Laterality: Right;  RN PREOP 1/25/2021, COVID NEGATIVE ON 1/25, cxr, and ecg done    AORTOGRAPHY WITH SERIALOGRAPHY Right 3/30/2021    Procedure: AORTOGRAM, WITH RIGHT  LOWER EXTREMITY ANGIOGRAM, POSSIBLE INTERVENTION;  Surgeon: Mitch Chan MD;  Location: [facility] OR;  Service: Vascular;  Laterality: Right;  RN PREOP 3/26/2021  COVID NEGATIVE    BAERVELDT GLAUCOMA IMPLANT Left 2012    WITH CATARACT EXTRACTION//DONE AT [facility]    BELOW KNEE AMPUTATION OF LOWER EXTREMITY Right 5/17/2021    Procedure: AMPUTATION, BELOW KNEE;  Surgeon: Christiana Pritchett MD;  Location: [facility] OR;  Service: Orthopedics;  Laterality: Right;  WOUND VAC -PREVENA  SUPINE---HAS--ICD- Iast interrogation -3/21  AMPUTATION TRAY  1ST CASE OKWHITNEY WHITLOCK  RN PREOP 5/13/2021     COVID --NEGATIVE ON 5/14---INCOMPLETE H/P    BIOPSY Right 12/18/2020    Procedure: Biopsy- trocar biopsy;  Surgeon: Brianna Champion DPM;  Location: [facility] OR;  Service: Podiatry;  Laterality: Right;    CARDIAC CATHETERIZATION Left 05/2016    CARDIAC DEFIBRILLATOR PLACEMENT Left 11/02/2018    CATARACT EXTRACTION W/  INTRAOCULAR LENS IMPLANT Left 2012    WITH BAERVEDT (DONE AT [facility])    CATARACT EXTRACTION W/  INTRAOCULAR LENS IMPLANT Right 09/26/2018    COMPLEX ()    CB DESTRUCTION WITH CYCLO G6 Left 02/15/2017        CYST REMOVAL      DEBRIDEMENT OF FOOT  12/28/2018    Procedure: DEBRIDEMENT, FOOT;  Surgeon: Mitch Chan MD;  Location: [facility] OR;  Service: Vascular;;    DEBRIDEMENT OF FOOT  6/5/2019    Procedure: DEBRIDEMENT, FOOT;  Surgeon: Mitch Chan MD;  Location: [facility] OR;  Service: Vascular;;    DEBRIDEMENT OF FOOT Right 3/5/2021    Procedure: DEBRIDEMENT, FOOT with Misonic device;  Surgeon: Mitch Chan MD;  Location: [facility] OR;  Service: Vascular;  Laterality: Right;    EXAMINATION UNDER ANESTHESIA Left 12/5/2018    Procedure: Exam under anesthesia, left foot debridement, washout and all other indicated procedures;  Surgeon: Mitch Chan MD;  Location: [facility] OR;  Service: Vascular;  Laterality: Left;  1030AM START  RN PREOP 12/3/2018-----AICD  SEEN BY DR JAMES 12/4     EXAMINATION UNDER ANESTHESIA Left 2/13/2019    Procedure: LEFT FOOT WOUND DEBRIDEMENT, WASHOUT AND ALL OTHER INDICATED PROCEDURES;  Surgeon: Mitch Wall MD;  Location: [facility] OR;  Service: Vascular;  Laterality: Left;  requesting 1st case start  THIS CASE 1ST PER DR WALL ON 2/1/19 @ 844AM -LO  RN PREOP 2/6/2019  HAS CARDIAC CLEARANCE    EXAMINATION UNDER ANESTHESIA Left 12/28/2018    Procedure: Exam under anesthesia, left foot debridement, left foot washout, possible left second through fifth metatarsal resection;  Surgeon: Mitch Wall MD;  Location: [facility] OR;  Service: Vascular;  Laterality: Left;  1:00pm start per julissa @ 8:58am  RN  PHONE PRE OP 12-27-18--=Pt coming from [facility]  NEED CONSENT    EXAMINATION UNDER ANESTHESIA Left 6/5/2019    Procedure: LEFT FOOT DEBRIDEMENT, WASHOUT AND ALL OTHER INDICATED PROCEDURES;  Surgeon: Mitch Wall MD;  Location: [facility] OR;  Service: Vascular;  Laterality: Left;  RN PRE OP 5-31-19------ICD------NEED CONSENT    EXAMINATION UNDER ANESTHESIA Right 11/9/2020    Procedure: RIGHT FOOT DEBRIDEMENT, WASHOUT AND ALL OTHER INDICATED PROCEDURES;  Surgeon: Mitch Wall MD;  Location: [facility] OR;  Service: Vascular;  Laterality: Right;  RN PREOP 10/27/2020, --COVID NEGATIVE ON 11/6, has cardiac clearance/[place] 10/27/2020, pt has ICD  NEEDS ORDERS    EXAMINATION UNDER ANESTHESIA Right 2/4/2021    Procedure: RIGHT FOOT DEBRIDEMENT, POSSIBLE CALCANECTOMY, POSSIBLE FIFTH TOE AMPUTATION, WASHOUT AND ALL OTHER INDICATED PROCEDURES;  Surgeon: Mitch Wall MD;  Location: [facility] OR;  Service: Vascular;  Laterality: Right;  RN PREOP 2/2/2021--COVID NEGATIVE ON 2/2  ICD    EYE SURGERY      CAT EXT OU    FOOT AMPUTATION THROUGH METATARSAL Right 3/5/2021    Procedure: Right foot wound debridement, possible transmetatarsal amputation, possible fifth toe amputation, washout;  Surgeon: Mitch Wall MD;  Location: [facility] OR;  Service: Vascular;   Laterality: Right;  MISONIX SONIC ONE JAYDEN LOUISE [phone] SPOKE TO HIM ON MY OFFICE @ 7:31AM ON 2-  RN PRE Op, Covid NEGATIVE ON  3-2-21.  C A  8:30AM START----NEED H/P    INCISION AND DRAINAGE Left 11/14/2018    Procedure: Incision and Drainage, left lower extremity debridement, washout;  Surgeon: Micth Chan MD;  Location: [facility] OR;  Service: Vascular;  Laterality: Left;    INCISION AND DRAINAGE Left 11/16/2018    Procedure: INCISION AND DRAINAGE;  Surgeon: Mitch Chan MD;  Location: [facility] OR;  Service: Vascular;  Laterality: Left;    INCISION AND DRAINAGE Right 11/18/2020    Procedure: Debridement and washout right lower extremity wounds;  Surgeon: Mitch Chan MD;  Location: [facility] OR 2ND FLR;  Service: Vascular;  Laterality: Right;    INCISION AND DRAINAGE Right 1/27/2021    Procedure: RIGHT FOOT DEBRIDEMENT, WASHOUT AND ALL OTHER INDICATED PROCEDURES;  Surgeon: Mitch Chan MD;  Location: [facility] OR 2ND FLR;  Service: Vascular;  Laterality: Right;    INTRAOCULAR PROSTHESES INSERTION Right 9/26/2018    Procedure: INSERTION, IOL PROSTHESIS;  Surgeon: Perla Cortés MD;  Location: [facility] OR 1ST FLR;  Service: Ophthalmology;  Laterality: Right;    PERITONEOCENTESIS N/A 3/1/2019    Procedure: PARACENTESIS, ABDOMINAL, INITIAL;  Surgeon: Dosc Diagnostic Provider;  Location: [facility] OR;  Service: Radiology;  Laterality: N/A;    PERITONEOCENTESIS N/A 3/25/2019    Procedure: PARACENTESIS, ABDOMINAL, INITIAL;  Surgeon: Dosc Diagnostic Provider;  Location: [facility] OR;  Service: Radiology;  Laterality: N/A;    PERITONEOCENTESIS N/A 7/22/2019    Procedure: PARACENTESIS, ABDOMINAL, INITIAL;  Surgeon: Dosc Diagnostic Provider;  Location: [facility] OR;  Service: Radiology;  Laterality: N/A;    PERITONEOCENTESIS N/A 8/16/2019    Procedure: PARACENTESIS, ABDOMINAL, INITIAL;  Surgeon: Dosc Diagnostic Provider;  Location: [facility] OR;  Service: Radiology;  Laterality: N/A;    PERITONEOCENTESIS N/A  9/26/2019    Procedure: PARACENTESIS, ABDOMINAL;  Surgeon: Dosc Diagnostic Provider;  Location: [facility] OR;  Service: Radiology;  Laterality: N/A;    PERITONEOCENTESIS N/A 10/11/2019    Procedure: PARACENTESIS, ABDOMINAL;  Surgeon: Dosc Diagnostic Provider;  Location: [facility] OR;  Service: Radiology;  Laterality: N/A;    PERITONEOCENTESIS N/A 10/31/2019    Procedure: PARACENTESIS, ABDOMINAL;  Surgeon: Dosc Diagnostic Provider;  Location: [facility] OR;  Service: Radiology;  Laterality: N/A;    PERITONEOCENTESIS N/A 11/18/2019    Procedure: PARACENTESIS, ABDOMINAL;  Surgeon: Dosc Diagnostic Provider;  Location: [facility] OR;  Service: Radiology;  Laterality: N/A;    PERITONEOCENTESIS N/A 12/16/2019    Procedure: PARACENTESIS, ABDOMINAL;  Surgeon: Dosc Diagnostic Provider;  Location: [facility] OR;  Service: Radiology;  Laterality: N/A;  2PM START    PERITONEOCENTESIS N/A 2/7/2020    Procedure: PARACENTESIS, ABDOMINAL;  Surgeon: Dosc Diagnostic Provider;  Location: [facility] OR;  Service: Radiology;  Laterality: N/A;  REQUESTED 10:30A START    PERITONEOCENTESIS N/A 2/19/2020    Procedure: PARACENTESIS, ABDOMINAL;  Surgeon: Dosc Diagnostic Provider;  Location: [facility] OR;  Service: Radiology;  Laterality: N/A;    PERITONEOCENTESIS N/A 2/28/2020    Procedure: PARACENTESIS, ABDOMINAL;  Surgeon: Dosc Diagnostic Provider;  Location: [facility] OR;  Service: Radiology;  Laterality: N/A;  REQUESTED 10AM START    PHACOEMULSIFICATION OF CATARACT Right 9/26/2018    Procedure: PHACOEMULSIFICATION, CATARACT;  Surgeon: Perla Cortés MD;  [facility];  Service: Ophthalmology;  Laterality: Right;    REPEAT ABDOMINAL PARACENTESIS N/A 2/11/2019    Procedure: PARACENTESIS, ABDOMINAL, REPEAT;  Surgeon: Dos Diagnostic Provider;  Location: [facility] OR;  Service: Radiology;  Laterality: N/A;  1PM START    REPEAT ABDOMINAL PARACENTESIS N/A 9/12/2019    Procedure: PARACENTESIS, ABDOMINAL, REPEAT;  Surgeon: Dosc Diagnostic Provider;  Location: [facility] OR;   Service: Radiology;  Laterality: N/A;  9AM START    REPEAT ABDOMINAL PARACENTESIS N/A 12/2/2019    Procedure: PARACENTESIS, ABDOMINAL, REPEAT;  Surgeon: Dosc Diagnostic Provider;  Location: [facility] OR;  Service: Radiology;  Laterality: N/A;  3PM START    REPEAT ABDOMINAL PARACENTESIS N/A 1/10/2020    Procedure: PARACENTESIS, ABDOMINAL, REPEAT;  Surgeon: Dosc Diagnostic Provider;  Location: [facility] OR;  Service: Radiology;  Laterality: N/A;  1130AM START    REPEAT ABDOMINAL PARACENTESIS N/A 1/20/2020    Procedure: PARACENTESIS, ABDOMINAL, REPEAT;  Surgeon: Dosc Diagnostic Provider;  Location: [facility] OR;  Service: Radiology;  Laterality: N/A;  1PM START    REPEAT ABDOMINAL PARACENTESIS N/A 1/31/2020    Procedure: PARACENTESIS, ABDOMINAL, REPEAT;  Surgeon: Dos Diagnostic Provider;  Location: [facility] OR;  Service: Radiology;  Laterality: N/A;  10AM START    REPEAT ABDOMINAL PARACENTESIS N/A 3/16/2020    Procedure: PARACENTESIS, ABDOMINAL, REPEAT;  Surgeon: Dos Diagnostic Provider;  Location: [facility] OR;  Service: Radiology;  Laterality: N/A;  10AM START    REPEAT ABDOMINAL PARACENTESIS N/A 3/30/2020    Procedure: PARACENTESIS, ABDOMINAL, REPEAT;  Surgeon: Dos Diagnostic Provider;  Location: [facility] OR;  Service: Radiology;  Laterality: N/A;    REPEAT ABDOMINAL PARACENTESIS N/A 4/9/2020    Procedure: PARACENTESIS, ABDOMINAL, REPEAT;  Surgeon: Dosc Diagnostic Provider;  Location: [facility] OR;  Service: Radiology;  Laterality: N/A;  1130AM START    REPEAT ABDOMINAL PARACENTESIS N/A 5/8/2020    Procedure: PARACENTESIS, ABDOMINAL, REPEAT;  Surgeon: Dos Diagnostic Provider;  Location: [facility] OR;  Service: Radiology;  Laterality: N/A;  9AM START    REPEAT ABDOMINAL PARACENTESIS N/A 5/21/2020    Procedure: PARACENTESIS, ABDOMINAL, REPEAT;  Surgeon: Dosc Diagnostic Provider;  Location: [facility] OR;  Service: Radiology;  Laterality: N/A;  10AM START    REPEAT ABDOMINAL PARACENTESIS N/A 6/5/2020    Procedure: PARACENTESIS, ABDOMINAL, REPEAT;  Surgeon:  Dosc Diagnostic Provider;  Location: [facility] OR;  Service: Radiology;  Laterality: N/A;  NOON START    REPEAT ABDOMINAL PARACENTESIS N/A 7/10/2020    Procedure: PARACENTESIS, ABDOMINAL, REPEAT;  Surgeon: Dosc Diagnostic Provider;  Location: [facility] OR;  Service: Radiology;  Laterality: N/A;  1PM START    REPEAT ABDOMINAL PARACENTESIS N/A 8/20/2020    Procedure: PARACENTESIS, ABDOMINAL, REPEAT;  Surgeon: Dosc Diagnostic Provider;  Location: [facility] OR;  Service: Radiology;  Laterality: N/A;  1PM START    REPEAT ABDOMINAL PARACENTESIS N/A 9/4/2020    Procedure: PARACENTESIS, ABDOMINAL, REPEAT;  Surgeon: Dosc Diagnostic Provider;  Location: [facility] OR;  Service: Radiology;  Laterality: N/A;  11 AM START    REPEAT ABDOMINAL PARACENTESIS N/A 9/16/2020    Procedure: PARACENTESIS, ABDOMINAL, REPEAT;  Surgeon: Dosc Diagnostic Provider;  Location: [facility] OR;  Service: Radiology;  Laterality: N/A;  START 2:00 P.M.    REPEAT ABDOMINAL PARACENTESIS N/A 9/28/2020    Procedure: PARACENTESIS, ABDOMINAL, REPEAT;  Surgeon: Dosc Diagnostic Provider;  Location: [facility] OR;  Service: Radiology;  Laterality: N/A;  1 P.M. START    REPEAT ABDOMINAL PARACENTESIS N/A 11/3/2020    Procedure: PARACENTESIS, ABDOMINAL, REPEAT;  Surgeon: Dosc Diagnostic Provider;  Location: [facility] OR;  Service: Radiology;  Laterality: N/A;  11AM START    REPEAT ABDOMINAL PARACENTESIS N/A 11/13/2020    Procedure: PARACENTESIS, ABDOMINAL, REPEAT;  Surgeon: Dosc Diagnostic Provider;  Location: [facility] OR;  Service: Radiology;  Laterality: N/A;  12PM START    REPEAT ABDOMINAL PARACENTESIS N/A 11/23/2020    Procedure: PARACENTESIS, ABDOMINAL, REPEAT;  Surgeon: Dosc Diagnostic Provider;  Location: [facility] OR;  Service: Radiology;  Laterality: N/A;    REPEAT ABDOMINAL PARACENTESIS N/A 12/1/2020    Procedure: PARACENTESIS, ABDOMINAL, REPEAT;  Surgeon: Dosc Diagnostic Provider;  Location: [facility] OR;  Service: Radiology;  Laterality: N/A;  11AM START    REPEAT ABDOMINAL PARACENTESIS N/A 12/8/2020     Procedure: PARACENTESIS, ABDOMINAL, REPEAT;  Surgeon: Dosc Diagnostic Provider;  Location: [facility] OR;  Service: Radiology;  Laterality: N/A;  93AM START    REPEAT ABDOMINAL PARACENTESIS N/A 12/17/2020    Procedure: PARACENTESIS, ABDOMINAL, REPEAT;  Surgeon: Dosc Diagnostic Provider;  Location: [facility] OR;  Service: Radiology;  Laterality: N/A;  10AM START    REPEAT ABDOMINAL PARACENTESIS N/A 12/30/2020    Procedure: PARACENTESIS, ABDOMINAL, REPEAT;  Surgeon: Dosc Diagnostic Provider;  Location: [facility] OR;  Service: Radiology;  Laterality: N/A;  9 A.M. START    REPEAT ABDOMINAL PARACENTESIS N/A 1/12/2021    Procedure: PARACENTESIS, ABDOMINAL, REPEAT;  Surgeon: Dosc Diagnostic Provider;  Location: [facility] OR;  Service: Radiology;  Laterality: N/A;  10 AM START    REPEAT ABDOMINAL PARACENTESIS N/A 2/10/2021    Procedure: PARACENTESIS, ABDOMINAL, REPEAT;  Surgeon: Dosc Diagnostic Provider;  Location: [facility] OR;  Service: Radiology;  Laterality: N/A;  2 P.M. START    REPEAT ABDOMINAL PARACENTESIS N/A 2/18/2021    Procedure: PARACENTESIS, ABDOMINAL, REPEAT;  Surgeon: Dosc Diagnostic Provider;  Location: [facility] OR;  Service: Radiology;  Laterality: N/A;  230PM START    REPEAT ABDOMINAL PARACENTESIS N/A 2/26/2021    Procedure: PARACENTESIS, ABDOMINAL, REPEAT;  Surgeon: Dosc Diagnostic Provider;  Location: [facility] OR;  Service: Radiology;  Laterality: N/A;    REPEAT ABDOMINAL PARACENTESIS N/A 3/4/2021    Procedure: PARACENTESIS, ABDOMINAL, REPEAT;  Surgeon: Dosc Diagnostic Provider;  Location: [facility] OR;  Service: Radiology;  Laterality: N/A;  9AM START    REPEAT ABDOMINAL PARACENTESIS N/A 3/12/2021    Procedure: PARACENTESIS, ABDOMINAL, REPEAT;  Surgeon: Dosc Diagnostic Provider;  Location: [facility] OR;  Service: Radiology;  Laterality: N/A;  10:00 START TIME  NO PRE-OP REQUIRED    REPEAT ABDOMINAL PARACENTESIS N/A 4/7/2021    Procedure: PARACENTESIS, ABDOMINAL, REPEAT;  Surgeon: Dosc Diagnostic Provider;  Location: [facility] OR;  Service:  Radiology;  Laterality: N/A;  1 P.M. START    REPEAT ABDOMINAL PARACENTESIS N/A 4/16/2021    Procedure: PARACENTESIS, ABDOMINAL, REPEAT;  Surgeon: Dos Diagnostic Provider;  Location: [facility] OR;  Service: Radiology;  Laterality: N/A;  9:OO START  NO PRE-OP REQ'D    REPEAT ABDOMINAL PARACENTESIS N/A 4/26/2021    Procedure: PARACENTESIS, ABDOMINAL, REPEAT;  Surgeon: Dosc Diagnostic Provider;  Location: [facility] OR;  Service: Radiology;  Laterality: N/A;  9AM START    REPEAT ABDOMINAL PARACENTESIS N/A 5/6/2021    Procedure: PARACENTESIS, ABDOMINAL, REPEAT;  Surgeon: Dosc Diagnostic Provider;  Location: [facility] OR;  Service: Radiology;  Laterality: N/A;  3PM START    REPEAT ABDOMINAL PARACENTESIS N/A 5/14/2021    Procedure: PARACENTESIS, ABDOMINAL, REPEAT;  Surgeon: Dos Diagnostic Provider;  Location: [facility] OR;  Service: Radiology;  Laterality: N/A;  1:00PM START  NO PRE-OP REQ'D    REPEAT ABDOMINAL PARACENTESIS N/A 5/24/2021    Procedure: PARACENTESIS, ABDOMINAL, REPEAT;  Surgeon: Dos Diagnostic Provider;  Location: [facility] OR;  Service: Radiology;  Laterality: N/A;  2 P.M START TIME    REPEAT ABDOMINAL PARACENTESIS N/A 6/4/2021    Procedure: PARACENTESIS, ABDOMINAL, REPEAT;  Surgeon: Dos Diagnostic Provider;  Location: [facility] OR;  Service: Radiology;  Laterality: N/A;  11AM START    REPEAT ABDOMINAL PARACENTESIS N/A 6/14/2021    Procedure: PARACENTESIS, ABDOMINAL, REPEAT;  Surgeon: Dosc Diagnostic Provider;  Location: [facility] OR;  Service: Radiology;  Laterality: N/A;  8AM START    REPEAT ABDOMINAL PARACENTESIS N/A 6/23/2021    Procedure: PARACENTESIS, ABDOMINAL, REPEAT;  Surgeon: Dos Diagnostic Provider;  Location: [facility] OR;  Service: Radiology;  Laterality: N/A;    REVISION OF PROCEDURE INVOLVING GLAUCOMA DRAINAGE DEVICE Left 10/2/2019    EXTRUDING BAERVELDT /WITH PERICARDIAL PATCH GRAFT ()    Right foot surgery  10/2014    TOE AMPUTATION Right     first and second    TOE AMPUTATION Left 11/16/2018    Procedure:  AMPUTATION, TOES  2-5;  Surgeon: Mitch Chan MD;  Location: [facility] OR;  Service: Vascular;  Laterality: Left;    TOE AMPUTATION Left 12/5/2018    Procedure: AMPUTATION, TOE;  Surgeon: Mitch Chan MD;  Location: [facility] OR;  Service: Vascular;  Laterality: Left;  left great toe    TONSILLECTOMY         Review of patient's allergies indicates:   Allergen Reactions    Statins-hmg-coa reductase inhibitors      Generalized Pain    Onglyza [saxagliptin]     Penicillins Rash       Current Facility-Administered Medications on File Prior to Encounter   Medication    lactated ringers infusion    lidocaine (PF) 10 mg/ml (1%) injection 10 mg     Current Outpatient Medications on File Prior to Encounter   Medication Sig    allopurinoL (ZYLOPRIM) 300 MG tablet Take 1 tablet (300 mg total) by mouth once daily.    aspirin (ECOTRIN) 81 MG EC tablet Take 81 mg by mouth once daily.    carvediloL (COREG) 3.125 MG tablet Take 1 tablet (3.125 mg total) by mouth 2 (two) times daily with meals.    ezetimibe (ZETIA) 10 mg tablet TAKE 1 TABLET(10 MG) BY MOUTH EVERY DAY    fish oil-omega-3 fatty acids 300-1,000 mg capsule Take 1 capsule by mouth 2 (two) times daily.    gabapentin (NEURONTIN) 100 MG capsule TAKE 2 CAPSULES(200 MG) BY MOUTH EVERY EVENING    torsemide (DEMADEX) 20 MG Tab TAKE 4 TABLETS(80 MG) BY MOUTH TWICE DAILY    acetaminophen (TYLENOL) 325 MG tablet Take 650 mg by mouth every 6 (six) hours as needed for Pain.    blood sugar diagnostic Strp To check BG 2 times daily, to use with insurance preferred meter. e11.65    blood-glucose meter kit To check BG 2 times daily, to use with insurance preferred meter    brimonidine 0.2% (ALPHAGAN) 0.2 % Drop INSTILL 1 DROP IN BOTH EYES THREE TIMES DAILY    clopidogreL (PLAVIX) 75 mg tablet Take 1 tablet (75 mg total) by mouth once daily.    coenzyme Q10 100 mg capsule Take 100 mg by mouth every morning.    collagenase (SANTYL) ointment Apply topically to diabetic ulcer on lower  "right leg once daily.    dorzolamide-timolol 2-0.5% (COSOPT) 22.3-6.8 mg/mL ophthalmic solution Place 1 drop into both eyes 3 (three) times daily.    HYDROcodone-acetaminophen (NORCO) 5-325 mg per tablet Take 1 tablet by mouth every 12 (twelve) hours as needed for Pain.    insulin degludec-liraglutide (XULTOPHY 100/3.6) 100 unit-3.6 mg /mL (3 mL) Pen Inject 35 Units into the skin once daily.    lancets Misc To check BG 2 times daily, to use with insurance preferred meter. Dx code e11.65    loratadine (CLARITIN) 10 mg tablet Take 10 mg by mouth daily as needed for Allergies.    metOLazone (ZAROXOLYN) 2.5 MG tablet Take 1 tablet (2.5 mg total) by mouth once daily.    MULTIVIT,THER IRON,CA,FA & MIN (MULTIVITAMIN) Tab Take 1 tablet by mouth every morning.     pen needle, diabetic (BD ULTRA-FINE AMADOR PEN NEEDLE) 32 gauge x 5/32" Ndle 1 Device by Misc.(Non-Drug; Combo Route) route 2 (two) times a day.     Family History       Problem Relation (Age of Onset)    Diabetes Mother    Heart disease Brother    No Known Problems Father, Sister, Maternal Aunt, Maternal Uncle, Paternal Aunt, Paternal Uncle, Maternal Grandmother, Maternal Grandfather, Paternal Grandmother, Paternal Grandfather          Tobacco Use    Smoking status: Former Smoker     Packs/day: 1.00     Years: 3.00     Pack years: 3.00     Types: Cigarettes     Quit date: 1984     Years since quittin.7    Smokeless tobacco: Never Used   Substance and Sexual Activity    Alcohol use: Not Currently     Alcohol/week: 1.0 standard drink     Types: 1 Cans of beer per week     Comment: rarely    Drug use: No    Sexual activity: Not Currently     Partners: Female     Review of Systems   Constitutional:  Positive for activity change. Negative for appetite change, diaphoresis, fatigue and fever.   HENT:  Negative for congestion, dental problem and drooling.    Eyes:  Negative for discharge and itching.   Respiratory:  Negative for apnea and chest tightness.  "   Cardiovascular:  Negative for chest pain, palpitations and leg swelling.   Gastrointestinal:  Positive for abdominal distention. Negative for abdominal pain.   Genitourinary:  Negative for flank pain and frequency.   Musculoskeletal:  Negative for arthralgias, back pain, gait problem and joint swelling.   Skin:  Negative for color change and pallor.   Neurological:  Negative for seizures.   Hematological:  Negative for adenopathy.   Psychiatric/Behavioral:  Negative for agitation, behavioral problems and confusion.    Objective:     Vital Signs (Most Recent):  Temp: 98.2 °F (36.8 °C) (04/04/22 1103)  Pulse: 84 (04/04/22 1103)  Resp: 17 (04/04/22 1109)  BP: (!) 112/55 (04/04/22 1103)  SpO2: (!) 94 % (04/04/22 1103)   Vital Signs (24h Range):  Temp:  [97.4 °F (36.3 °C)-98.4 °F (36.9 °C)] 98.2 °F (36.8 °C)  Pulse:  [76-84] 84  Resp:  [16-18] 17  SpO2:  [93 %-95 %] 94 %  BP: (105-138)/(53-64) 112/55     Weight: 84.4 kg (186 lb)  Body mass index is 26.69 kg/m².    Physical Exam  Vitals and nursing note reviewed.   Constitutional:       General: He is not in acute distress.     Appearance: Normal appearance. He is obese. He is not ill-appearing, toxic-appearing or diaphoretic.   HENT:      Head: Normocephalic and atraumatic.   Eyes:      Conjunctiva/sclera: Conjunctivae normal.   Cardiovascular:      Rate and Rhythm: Normal rate and regular rhythm.   Pulmonary:      Effort: Pulmonary effort is normal. No respiratory distress.      Breath sounds: No wheezing or rales.   Abdominal:      General: There is distension.      Palpations: There is no mass.      Tenderness: There is no abdominal tenderness. There is no guarding.      Hernia: No hernia is present.   Skin:     General: Skin is warm and dry.   Neurological:      General: No focal deficit present.      Mental Status: He is alert.   Psychiatric:         Mood and Affect: Mood normal.         Thought Content: Thought content normal.         Judgment: Judgment normal.            Significant Labs: All pertinent labs within the past 24 hours have been reviewed.  BMP:   Recent Labs   Lab 04/04/22  0737   *   *   K 2.9*   CL 93*   CO2 29   BUN 78*   CREATININE 2.3*   CALCIUM 7.9*     CBC:   Recent Labs   Lab 04/04/22  0737   WBC 8.34   HGB 13.6*   HCT 41.2          Significant Imaging:   CXR- pulmonary edema   EKG ordered

## 2022-04-04 NOTE — H&P
[facility] Medicine  History & Physical    Patient Name: Marvin Ray  MRN: [id number]  Patient Class: IP- Inpatient  Admission Date: 4/4/2022  Attending Physician: Roberto Black MD   Primary Care Provider: Rubén Davis MD         Patient information was obtained from patient and ER records.     Subjective:     Principal Problem:Fracture of femur    Chief Complaint:   Chief Complaint   Patient presents with    Fall     Pt here via [facility] with c/o left leg pain after fall while trying to pull his pants up, pt usually in a wheelchair, has right leg amputation         HPI: Mr. Ray is a 61 yo M who presents after a mechanical fall in the bathroom with a CC of L leg pain. He was found to have L femur fracture. Patient has a known history of CAD and systolic heart failure with a ICD and EF of 20%.  His xray was read as pulmonary edema although he states he is not SOB at all and has not had a CHF exacerbation in many years. He as liver cirrhosis with ascites that gets drained every Thursday.  He is s/p R BKA and is mainly wheelchair bound but is going to out patient PT/OT 3 times a week to learn to walk with a prosthesis.       Past Medical History:   Diagnosis Date    Arthritis     Cellulitis     CKD (chronic kidney disease), stage III     Coronary artery disease     Diabetes mellitus     Diabetic retinopathy     Diabetic ulcer of left foot     Glaucoma     Gout     Hyperlipemia     Hypertension     ICD (implantable cardioverter-defibrillator) in place 11/02/2018    Left chest    Non-pressure chronic ulcer of other part of left foot with fat layer exposed 10/23/2018    PVD (peripheral vascular disease)     Type 2 diabetes mellitus with left diabetic foot ulcer 10/29/2018    Unsteady gait     uses a wheelchair       Past Surgical History:   Procedure Laterality Date    AHMED GLAUCOMA IMPLANT Left 2011    DONE AT [facility]    AORTOGRAPHY WITH SERIALOGRAPHY Left  4/30/2019    Procedure: AORTOGRAM, WITH SERIALOGRAPHY, LEFT LOWER EXTREMITY, POSSIBLE INTERVENTION;  Surgeon: Mitch Chan MD;  Location: [facility] OR;  Service: Vascular;  Laterality: Left;  RN PRE OP 4-23-19.  NO H & P, No Cosent  [place]    AORTOGRAPHY WITH SERIALOGRAPHY Right 10/6/2020    Procedure: AORTOGRAM, WITH SERIALOGRAPHY, RIGHT LOWER EXTREMITY ANGIOGRAM, POSSIBLE INTERVENTION;  Surgeon: Mitch Chan MD;  Location: [facility] OR;  Service: Vascular;  Laterality: Right;  RN PREOP 10/1/2020--COVID NEGATIVE ON 10/5    AORTOGRAPHY WITH SERIALOGRAPHY Right 1/13/2021    Procedure: AORTOGRAM, WITH RIGHT LOWER EXTREMITY ANGIOGRAM, POSSIBLE INTERVENTION;  Surgeon: Mitch Chan MD;  Location: [facility] OR;  Service: Vascular;  Laterality: Right;  1PM START----NEED CONSENT  RN PREOP 1/8/2021--COVID NEGATIVE ON 1/12    AORTOGRAPHY WITH SERIALOGRAPHY Right 1/26/2021    Procedure: AORTOGRAM, RIGHT LOWER EXTREMITY ANGIOGRAM, POSSIBLE INTERVENTION;  Surgeon: Mitch Chan MD;  Location: [facility] OR;  Service: Vascular;  Laterality: Right;  RN PREOP 1/25/2021, COVID NEGATIVE ON 1/25, cxr, and ecg done    AORTOGRAPHY WITH SERIALOGRAPHY Right 3/30/2021    Procedure: AORTOGRAM, WITH RIGHT LOWER EXTREMITY ANGIOGRAM, POSSIBLE INTERVENTION;  Surgeon: Mitch Chan MD;  Location: [facility] OR;  Service: Vascular;  Laterality: Right;  RN PREOP 3/26/2021  COVID NEGATIVE    BAERVELDT GLAUCOMA IMPLANT Left 2012    WITH CATARACT EXTRACTION//DONE AT [facility]    BELOW KNEE AMPUTATION OF LOWER EXTREMITY Right 5/17/2021    Procedure: AMPUTATION, BELOW KNEE;  Surgeon: Christiana Pritchett MD;  Location: [facility] OR;  Service: Orthopedics;  Laterality: Right;  WOUND VAC -PREVENA  SUPINE---HAS--ICD- Iast interrogation -3/21  AMPUTATION TRAY  1ST CASE OKWHITNEY WHITLOCK  RN PREOP 5/13/2021     COVID --NEGATIVE ON 5/14---INCOMPLETE H/P    BIOPSY Right 12/18/2020    Procedure: Biopsy- trocar biopsy;  Surgeon: Brianna Champion DPM;  Location:  [facility] OR;  Service: Podiatry;  Laterality: Right;    CARDIAC CATHETERIZATION Left 05/2016    CARDIAC DEFIBRILLATOR PLACEMENT Left 11/02/2018    CATARACT EXTRACTION W/  INTRAOCULAR LENS IMPLANT Left 2012    WITH BAERVEDT ([facility]ONE AT [facility])    CATARACT EXTRACTION W/  INTRAOCULAR LENS IMPLANT Right 09/26/2018    COMPLEX ()    CB DESTRUCTION WITH CYCLO G6 Left 02/15/2017        CYST REMOVAL      DEBRIDEMENT OF FOOT  12/28/2018    Procedure: DEBRIDEMENT, FOOT;  Surgeon: Mitch Wall MD;  Location: [facility] OR;  Service: Vascular;;    DEBRIDEMENT OF FOOT  6/5/2019    Procedure: DEBRIDEMENT, FOOT;  Surgeon: Mitch Wall MD;  Location: [facility] OR;  Service: Vascular;;    DEBRIDEMENT OF FOOT Right 3/5/2021    Procedure: DEBRIDEMENT, FOOT with Misonic device;  Surgeon: Mitch Wall MD;  Location: [facility] OR;  Service: Vascular;  Laterality: Right;    EXAMINATION UNDER ANESTHESIA Left 12/5/2018    Procedure: Exam under anesthesia, left foot debridement, washout and all other indicated procedures;  Surgeon: Mitch Wall MD;  Location: [facility] OR;  Service: Vascular;  Laterality: Left;  1030AM START  RN PREOP 12/3/2018-----AICD  SEEN BY DR JAMES 12/4    EXAMINATION UNDER ANESTHESIA Left 2/13/2019    Procedure: LEFT FOOT WOUND DEBRIDEMENT, WASHOUT AND ALL OTHER INDICATED PROCEDURES;  Surgeon: Mitch Wall MD;  Location: [facility] OR;  Service: Vascular;  Laterality: Left;  requesting 1st case start  THIS CASE 1ST PER DR WALL ON 2/1/19 @ 844AM -LO  RN PREOP 2/6/2019  HAS CARDIAC CLEARANCE    EXAMINATION UNDER ANESTHESIA Left 12/28/2018    Procedure: Exam under anesthesia, left foot debridement, left foot washout, possible left second through fifth metatarsal resection;  Surgeon: Mitch Wall MD;  Location: [facility] OR;  Service: Vascular;  Laterality: Left;  1:00pm start per julissa @ 8:58am  RN  PHONE PRE OP 12-27-18--=Pt coming from [facility] on [address]  NEED CONSENT     EXAMINATION UNDER ANESTHESIA Left 6/5/2019    Procedure: LEFT FOOT DEBRIDEMENT, WASHOUT AND ALL OTHER INDICATED PROCEDURES;  Surgeon: Mitch Chan MD;  Location: [facility] OR;  Service: Vascular;  Laterality: Left;  RN PRE OP 5-31-19------ICD------NEED CONSENT    EXAMINATION UNDER ANESTHESIA Right 11/9/2020    Procedure: RIGHT FOOT DEBRIDEMENT, WASHOUT AND ALL OTHER INDICATED PROCEDURES;  Surgeon: Mitch Chan MD;  Location: [facility] OR;  Service: Vascular;  Laterality: Right;  RN PREOP 10/27/2020, --COVID NEGATIVE ON 11/6, has cardiac clearance/[place] 10/27/2020, pt has ICD  NEEDS ORDERS    EXAMINATION UNDER ANESTHESIA Right 2/4/2021    Procedure: RIGHT FOOT DEBRIDEMENT, POSSIBLE CALCANECTOMY, POSSIBLE FIFTH TOE AMPUTATION, WASHOUT AND ALL OTHER INDICATED PROCEDURES;  Surgeon: Mitch Chna MD;  Location: [facility] OR;  Service: Vascular;  Laterality: Right;  RN PREOP 2/2/2021--COVID NEGATIVE ON 2/2  ICD    EYE SURGERY      CAT EXT OU    FOOT AMPUTATION THROUGH METATARSAL Right 3/5/2021    Procedure: Right foot wound debridement, possible transmetatarsal amputation, possible fifth toe amputation, washout;  Surgeon: Mitch Chan MD;  Location: [facility] OR;  Service: Vascular;  Laterality: Right;  MISONIX SONIC NAA LOUISE [phone] SPOKE TO HIM ON MY OFFICE @ 7:31AM ON 2-  RN PRE Op, Covid NEGATIVE ON  3-2-21.  C A  8:30AM START----NEED H/P    INCISION AND DRAINAGE Left 11/14/2018    Procedure: Incision and Drainage, left lower extremity debridement, washout;  Surgeon: Mitch Chan MD;  Location: [facility] OR;  Service: Vascular;  Laterality: Left;    INCISION AND DRAINAGE Left 11/16/2018    Procedure: INCISION AND DRAINAGE;  Surgeon: Mitch Chan MD;  Location: [facility] OR;  Service: Vascular;  Laterality: Left;    INCISION AND DRAINAGE Right 11/18/2020    Procedure: Debridement and washout right lower extremity wounds;  Surgeon: Mitch Chan MD;  Location:  [facility] OR 2ND FLR;  Service: Vascular;  Laterality: Right;    INCISION AND DRAINAGE Right 1/27/2021    Procedure: RIGHT FOOT DEBRIDEMENT, WASHOUT AND ALL OTHER INDICATED PROCEDURES;  Surgeon: Mitch Chan MD;  Location: [facility] OR 2ND FLR;  Service: Vascular;  Laterality: Right;    INTRAOCULAR PROSTHESES INSERTION Right 9/26/2018    Procedure: INSERTION, IOL PROSTHESIS;  Surgeon: Perla Cortés MD;  Location: [facility] OR 1ST FLR;  Service: Ophthalmology;  Laterality: Right;    PERITONEOCENTESIS N/A 3/1/2019    Procedure: PARACENTESIS, ABDOMINAL, INITIAL;  Surgeon: Dosc Diagnostic Provider;  [facility]: [facility] OR;  Service: Radiology;  Laterality: N/A;    PERITONEOCENTESIS N/A 3/25/2019    Procedure: PARACENTESIS, ABDOMINAL, INITIAL;  Surgeon: Dosc Diagnostic Provider;  Location: [facility] OR;  Service: Radiology;  Laterality: N/A;    PERITONEOCENTESIS N/A 7/22/2019    Procedure: PARACENTESIS, ABDOMINAL, INITIAL;  Surgeon: Dosc Diagnostic Provider;  Location: [facility] OR;  Service: Radiology;  Laterality: N/A;    PERITONEOCENTESIS N/A 8/16/2019    Procedure: PARACENTESIS, ABDOMINAL, INITIAL;  Surgeon: Dosc Diagnostic Provider;  Location: [facility] OR;  Service: Radiology;  Laterality: N/A;    PERITONEOCENTESIS N/A 9/26/2019    Procedure: PARACENTESIS, ABDOMINAL;  Surgeon: Dosc Diagnostic Provider;  Location: [facility] OR;  Service: Radiology;  Laterality: N/A;    PERITONEOCENTESIS N/A 10/11/2019    Procedure: PARACENTESIS, ABDOMINAL;  Surgeon: Dosc Diagnostic Provider;  Location: [facility] OR;  Service: Radiology;  Laterality: N/A;    PERITONEOCENTESIS N/A 10/31/2019    Procedure: PARACENTESIS, ABDOMINAL;  Surgeon: Dosc Diagnostic Provider;  [facility];  Service: Radiology;  Laterality: N/A;    PERITONEOCENTESIS N/A 11/18/2019    Procedure: PARACENTESIS, ABDOMINAL;  Surgeon: Dosc Diagnostic Provider;  Location: [facility] OR;  Service: Radiology;  Laterality: N/A;    PERITONEOCENTESIS N/A 12/16/2019    Procedure: PARACENTESIS,  ABDOMINAL;  Surgeon: Dosc Diagnostic Provider;  Location: [facility] OR;  Service: Radiology;  Laterality: N/A;  2PM START    PERITONEOCENTESIS N/A 2/7/2020    Procedure: PARACENTESIS, ABDOMINAL;  Surgeon: Dosc Diagnostic Provider;  Location: [facility] OR;  Service: Radiology;  Laterality: N/A;  REQUESTED 10:30A START    PERITONEOCENTESIS N/A 2/19/2020    Procedure: PARACENTESIS, ABDOMINAL;  Surgeon: Dosc Diagnostic Provider;  Location: [facility] OR;  Service: Radiology;  Laterality: N/A;    PERITONEOCENTESIS N/A 2/28/2020    Procedure: PARACENTESIS, ABDOMINAL;  Surgeon: Dosc Diagnostic Provider;  Location: [facility] OR;  Service: Radiology;  Laterality: N/A;  REQUESTED 10AM START    PHACOEMULSIFICATION OF CATARACT Right 9/26/2018    Procedure: PHACOEMULSIFICATION, CATARACT;  Surgeon: Perla Cortés MD;  Location: [facility] OR [address] [place] [address];  Service: Ophthalmology;  Laterality: Right;    REPEAT ABDOMINAL PARACENTESIS N/A 2/11/2019    Procedure: PARACENTESIS, ABDOMINAL, REPEAT;  Surgeon: Dosc Diagnostic Provider;  Location: [facility] OR;  Service: Radiology;  Laterality: N/A;  1PM START    REPEAT ABDOMINAL PARACENTESIS N/A 9/12/2019    Procedure: PARACENTESIS, ABDOMINAL, REPEAT;  Surgeon: Dosc Diagnostic Provider;  Location: [facility] OR;  Service: Radiology;  Laterality: N/A;  9AM START    REPEAT ABDOMINAL PARACENTESIS N/A 12/2/2019    Procedure: PARACENTESIS, ABDOMINAL, REPEAT;  Surgeon: Dosc Diagnostic Provider;  Location: [facility] OR;  Service: Radiology;  Laterality: N/A;  3PM START    REPEAT ABDOMINAL PARACENTESIS N/A 1/10/2020    Procedure: PARACENTESIS, ABDOMINAL, REPEAT;  Surgeon: Dosc Diagnostic Provider;  Location: [facility] OR;  Service: Radiology;  Laterality: N/A;  1130AM START    REPEAT ABDOMINAL PARACENTESIS N/A 1/20/2020    Procedure: PARACENTESIS, ABDOMINAL, REPEAT;  Surgeon: Dosc Diagnostic Provider;  Location: [facility] OR;  Service: Radiology;  Laterality: N/A;  1PM START    REPEAT ABDOMINAL PARACENTESIS N/A 1/31/2020    Procedure:  PARACENTESIS, ABDOMINAL, REPEAT;  Surgeon: Dosc Diagnostic Provider;  Location: [facility] OR;  Service: Radiology;  Laterality: N/A;  10AM START    REPEAT ABDOMINAL PARACENTESIS N/A 3/16/2020    Procedure: PARACENTESIS, ABDOMINAL, REPEAT;  Surgeon: Dosc Diagnostic Provider;  [facility];  Service: Radiology;  Laterality: N/A;  10AM START    REPEAT ABDOMINAL PARACENTESIS N/A 3/30/2020    Procedure: PARACENTESIS, ABDOMINAL, REPEAT;  Surgeon: Dosc Diagnostic Provider;  [facility];  Service: Radiology;  Laterality: N/A;    REPEAT ABDOMINAL PARACENTESIS N/A 4/9/2020    Procedure: PARACENTESIS, ABDOMINAL, REPEAT;  Surgeon: Dosc Diagnostic Provider;  [facility];  Service: Radiology;  Laterality: N/A;  1130AM START    REPEAT ABDOMINAL PARACENTESIS N/A 5/8/2020    Procedure: PARACENTESIS, ABDOMINAL, REPEAT;  Surgeon: Dos Diagnostic Provider;  [facility];  Service: Radiology;  Laterality: N/A;  9AM START    REPEAT ABDOMINAL PARACENTESIS N/A 5/21/2020    Procedure: PARACENTESIS, ABDOMINAL, REPEAT;  Surgeon: Dosc Diagnostic Provider;  Location: [facility] OR;  Service: Radiology;  Laterality: N/A;  10AM START    REPEAT ABDOMINAL PARACENTESIS N/A 6/5/2020    Procedure: PARACENTESIS, ABDOMINAL, REPEAT;  Surgeon: Dos Diagnostic Provider;  [facility];  Service: Radiology;  Laterality: N/A;  NOON START    REPEAT ABDOMINAL PARACENTESIS N/A 7/10/2020    Procedure: PARACENTESIS, ABDOMINAL, REPEAT;  Surgeon: Dosc Diagnostic Provider;  Location: [facility] OR;  Service: Radiology;  Laterality: N/A;  1PM START    REPEAT ABDOMINAL PARACENTESIS N/A 8/20/2020    Procedure: PARACENTESIS, ABDOMINAL, REPEAT;  Surgeon: Dosc Diagnostic Provider;  [facility];  Service: Radiology;  Laterality: N/A;  1PM START    REPEAT ABDOMINAL PARACENTESIS N/A 9/4/2020    Procedure: PARACENTESIS, ABDOMINAL, REPEAT;  Surgeon: Dosc Diagnostic Provider;  [facility];  Service: Radiology;  Laterality: N/A;  11 AM START     REPEAT ABDOMINAL PARACENTESIS N/A 9/16/2020    Procedure: PARACENTESIS, ABDOMINAL, REPEAT;  Surgeon: Dosc Diagnostic Provider;  Location: [facility] OR;  Service: Radiology;  Laterality: N/A;  START 2:00 P.M.    REPEAT ABDOMINAL PARACENTESIS N/A 9/28/2020    Procedure: PARACENTESIS, ABDOMINAL, REPEAT;  Surgeon: Dosc Diagnostic Provider;  Location: [facility] OR;  Service: Radiology;  Laterality: N/A;  1 P.M. START    REPEAT ABDOMINAL PARACENTESIS N/A 11/3/2020    Procedure: PARACENTESIS, ABDOMINAL, REPEAT;  Surgeon: Dos Diagnostic Provider;  Location: [facility] OR;  Service: Radiology;  Laterality: N/A;  11AM START    REPEAT ABDOMINAL PARACENTESIS N/A 11/13/2020    Procedure: PARACENTESIS, ABDOMINAL, REPEAT;  Surgeon: Dos Diagnostic Provider;  Location: [facility] OR;  Service: Radiology;  Laterality: N/A;  12PM START    REPEAT ABDOMINAL PARACENTESIS N/A 11/23/2020    Procedure: PARACENTESIS, ABDOMINAL, REPEAT;  Surgeon: Dos Diagnostic Provider;  Location: [facility] OR;  Service: Radiology;  Laterality: N/A;    REPEAT ABDOMINAL PARACENTESIS N/A 12/1/2020    Procedure: PARACENTESIS, ABDOMINAL, REPEAT;  Surgeon: Dos Diagnostic Provider;  Location: [facility] OR;  Service: Radiology;  Laterality: N/A;  11AM START    REPEAT ABDOMINAL PARACENTESIS N/A 12/8/2020    Procedure: PARACENTESIS, ABDOMINAL, REPEAT;  Surgeon: Dos Diagnostic Provider;  Location: [facility] OR;  Service: Radiology;  Laterality: N/A;  93AM START    REPEAT ABDOMINAL PARACENTESIS N/A 12/17/2020    Procedure: PARACENTESIS, ABDOMINAL, REPEAT;  Surgeon: Dos Diagnostic Provider;  Location: [facility] OR;  Service: Radiology;  Laterality: N/A;  10AM START    REPEAT ABDOMINAL PARACENTESIS N/A 12/30/2020    Procedure: PARACENTESIS, ABDOMINAL, REPEAT;  Surgeon: Dos Diagnostic Provider;  Location: [facility] OR;  Service: Radiology;  Laterality: N/A;  9 A.M. START    REPEAT ABDOMINAL PARACENTESIS N/A 1/12/2021    Procedure: PARACENTESIS, ABDOMINAL, REPEAT;  Surgeon: Dos Diagnostic Provider;   Location: [facility] OR;  Service: Radiology;  Laterality: N/A;  10 AM START    REPEAT ABDOMINAL PARACENTESIS N/A 2/10/2021    Procedure: PARACENTESIS, ABDOMINAL, REPEAT;  Surgeon: Dosc Diagnostic Provider;  Location: [facility] OR;  Service: Radiology;  Laterality: N/A;  2 P.M. START    REPEAT ABDOMINAL PARACENTESIS N/A 2/18/2021    Procedure: PARACENTESIS, ABDOMINAL, REPEAT;  Surgeon: Dosc Diagnostic Provider;  Location: [facility] OR;  Service: Radiology;  Laterality: N/A;  230PM START    REPEAT ABDOMINAL PARACENTESIS N/A 2/26/2021    Procedure: PARACENTESIS, ABDOMINAL, REPEAT;  Surgeon: Dosc Diagnostic Provider;  Location: [facility] OR;  Service: Radiology;  Laterality: N/A;    REPEAT ABDOMINAL PARACENTESIS N/A 3/4/2021    Procedure: PARACENTESIS, ABDOMINAL, REPEAT;  Surgeon: Dosc Diagnostic Provider;  Location: [facility] OR;  Service: Radiology;  Laterality: N/A;  9AM START    REPEAT ABDOMINAL PARACENTESIS N/A 3/12/2021    Procedure: PARACENTESIS, ABDOMINAL, REPEAT;  Surgeon: Dosc Diagnostic Provider;  Location: [facility] OR;  Service: Radiology;  Laterality: N/A;  10:00 START TIME  NO PRE-OP REQUIRED    REPEAT ABDOMINAL PARACENTESIS N/A 4/7/2021    Procedure: PARACENTESIS, ABDOMINAL, REPEAT;  Surgeon: Dosc Diagnostic Provider;  Location: [facility] OR;  Service: Radiology;  Laterality: N/A;  1 P.M. START    REPEAT ABDOMINAL PARACENTESIS N/A 4/16/2021    Procedure: PARACENTESIS, ABDOMINAL, REPEAT;  Surgeon: Dosc Diagnostic Provider;  Location: [facility] OR;  Service: Radiology;  Laterality: N/A;  9:OO START  NO PRE-OP REQ'D    REPEAT ABDOMINAL PARACENTESIS N/A 4/26/2021    Procedure: PARACENTESIS, ABDOMINAL, REPEAT;  Surgeon: Dosc Diagnostic Provider;  Location: [facility] OR;  Service: Radiology;  Laterality: N/A;  9AM START    REPEAT ABDOMINAL PARACENTESIS N/A 5/6/2021    Procedure: PARACENTESIS, ABDOMINAL, REPEAT;  Surgeon: Dosc Diagnostic Provider;  Location: [facility] OR;  Service: Radiology;  Laterality: N/A;  3PM START    REPEAT ABDOMINAL  PARACENTESIS N/A 5/14/2021    Procedure: PARACENTESIS, ABDOMINAL, REPEAT;  Surgeon: [facility] Diagnostic Provider;  Location: [facility] OR;  Service: Radiology;  Laterality: N/A;  1:00PM START  NO PRE-OP REQ'D    REPEAT ABDOMINAL PARACENTESIS N/A 5/24/2021    Procedure: PARACENTESIS, ABDOMINAL, REPEAT;  Surgeon: Dosc Diagnostic Provider;  Location: [facility] OR;  Service: Radiology;  Laterality: N/A;  2 P.M START TIME    REPEAT ABDOMINAL PARACENTESIS N/A 6/4/2021    Procedure: PARACENTESIS, ABDOMINAL, REPEAT;  Surgeon: [facility] Diagnostic Provider;  Location: [facility] OR;  Service: Radiology;  Laterality: N/A;  11AM START    REPEAT ABDOMINAL PARACENTESIS N/A 6/14/2021    Procedure: PARACENTESIS, ABDOMINAL, REPEAT;  Surgeon: [facility] Diagnostic Provider;  Location: [facility] OR;  Service: Radiology;  Laterality: N/A;  8AM START    REPEAT ABDOMINAL PARACENTESIS N/A 6/23/2021    Procedure: PARACENTESIS, ABDOMINAL, REPEAT;  Surgeon: [facility] Diagnostic Provider;  Location: [facility] OR;  Service: Radiology;  Laterality: N/A;    REVISION OF PROCEDURE INVOLVING GLAUCOMA DRAINAGE DEVICE Left 10/2/2019    EXTRUDING BAERVELDT /WITH PERICARDIAL PATCH GRAFT ()    Right foot surgery  10/2014    TOE AMPUTATION Right     first and second    TOE AMPUTATION Left 11/16/2018    Procedure: AMPUTATION, TOES  2-5;  Surgeon: Mitch Chan MD;  Location: [facility] OR;  Service: Vascular;  Laterality: Left;    TOE AMPUTATION Left 12/5/2018    Procedure: AMPUTATION, TOE;  Surgeon: Mitch Chan MD;  Location: [facility] OR;  Service: Vascular;  Laterality: Left;  left great toe    TONSILLECTOMY         Review of patient's allergies indicates:   Allergen Reactions    Statins-hmg-coa reductase inhibitors      Generalized Pain    Onglyza [saxagliptin]     Penicillins Rash       Current Facility-Administered Medications on File Prior to Encounter   Medication    lactated ringers infusion    lidocaine (PF) 10 mg/ml (1%) injection 10 mg     Current  Outpatient Medications on File Prior to Encounter   Medication Sig    allopurinoL (ZYLOPRIM) 300 MG tablet Take 1 tablet (300 mg total) by mouth once daily.    aspirin (ECOTRIN) 81 MG EC tablet Take 81 mg by mouth once daily.    carvediloL (COREG) 3.125 MG tablet Take 1 tablet (3.125 mg total) by mouth 2 (two) times daily with meals.    ezetimibe (ZETIA) 10 mg tablet TAKE 1 TABLET(10 MG) BY MOUTH EVERY DAY    fish oil-omega-3 fatty acids 300-1,000 mg capsule Take 1 capsule by mouth 2 (two) times daily.    gabapentin (NEURONTIN) 100 MG capsule TAKE 2 CAPSULES(200 MG) BY MOUTH EVERY EVENING    torsemide (DEMADEX) 20 MG Tab TAKE 4 TABLETS(80 MG) BY MOUTH TWICE DAILY    acetaminophen (TYLENOL) 325 MG tablet Take 650 mg by mouth every 6 (six) hours as needed for Pain.    blood sugar diagnostic Strp To check BG 2 times daily, to use with insurance preferred meter. e11.65    blood-glucose meter kit To check BG 2 times daily, to use with insurance preferred meter    brimonidine 0.2% (ALPHAGAN) 0.2 % Drop INSTILL 1 DROP IN BOTH EYES THREE TIMES DAILY    clopidogreL (PLAVIX) 75 mg tablet Take 1 tablet (75 mg total) by mouth once daily.    coenzyme Q10 100 mg capsule Take 100 mg by mouth every morning.    collagenase (SANTYL) ointment Apply topically to diabetic ulcer on lower right leg once daily.    dorzolamide-timolol 2-0.5% (COSOPT) 22.3-6.8 mg/mL ophthalmic solution Place 1 drop into both eyes 3 (three) times daily.    HYDROcodone-acetaminophen (NORCO) 5-325 mg per tablet Take 1 tablet by mouth every 12 (twelve) hours as needed for Pain.    insulin degludec-liraglutide (XULTOPHY 100/3.6) 100 unit-3.6 mg /mL (3 mL) Pen Inject 35 Units into the skin once daily.    lancets Misc To check BG 2 times daily, to use with insurance preferred meter. Dx code e11.65    loratadine (CLARITIN) 10 mg tablet Take 10 mg by mouth daily as needed for Allergies.    metOLazone (ZAROXOLYN) 2.5 MG tablet Take 1 tablet (2.5 mg  "total) by mouth once daily.    MULTIVIT,THER IRON,CA,FA & MIN (MULTIVITAMIN) Tab Take 1 tablet by mouth every morning.     pen needle, diabetic (BD ULTRA-FINE AMADOR PEN NEEDLE) 32 gauge x 5/32" Ndle 1 Device by Misc.(Non-Drug; Combo Route) route 2 (two) times a day.     Family History       Problem Relation (Age of Onset)    Diabetes Mother    Heart disease Brother    No Known Problems Father, Sister, Maternal Aunt, Maternal Uncle, Paternal Aunt, Paternal Uncle, Maternal Grandmother, Maternal Grandfather, Paternal Grandmother, Paternal Grandfather          Tobacco Use    Smoking status: Former Smoker     Packs/day: 1.00     Years: 3.00     Pack years: 3.00     Types: Cigarettes     Quit date: 1984     Years since quittin.7    Smokeless tobacco: Never Used   Substance and Sexual Activity    Alcohol use: Not Currently     Alcohol/week: 1.0 standard drink     Types: 1 Cans of beer per week     Comment: rarely    Drug use: No    Sexual activity: Not Currently     Partners: Female     Review of Systems   Constitutional:  Positive for activity change. Negative for appetite change, diaphoresis, fatigue and fever.   HENT:  Negative for congestion, dental problem and drooling.    Eyes:  Negative for discharge and itching.   Respiratory:  Negative for apnea and chest tightness.    Cardiovascular:  Negative for chest pain, palpitations and leg swelling.   Gastrointestinal:  Positive for abdominal distention. Negative for abdominal pain.   Genitourinary:  Negative for flank pain and frequency.   Musculoskeletal:  Negative for arthralgias, back pain, gait problem and joint swelling.   Skin:  Negative for color change and pallor.   Neurological:  Negative for seizures.   Hematological:  Negative for adenopathy.   Psychiatric/Behavioral:  Negative for agitation, behavioral problems and confusion.    Objective:     Vital Signs (Most Recent):  Temp: 98.2 °F (36.8 °C) (22 1103)  Pulse: 84 (22 1103)  Resp: " 17 (04/04/22 1109)  BP: (!) 112/55 (04/04/22 1103)  SpO2: (!) 94 % (04/04/22 1103)   Vital Signs (24h Range):  Temp:  [97.4 °F (36.3 °C)-98.4 °F (36.9 °C)] 98.2 °F (36.8 °C)  Pulse:  [76-84] 84  Resp:  [16-18] 17  SpO2:  [93 %-95 %] 94 %  BP: (105-138)/(53-64) 112/55     Weight: 84.4 kg (186 lb)  Body mass index is 26.69 kg/m².    Physical Exam  Vitals and nursing note reviewed.   Constitutional:       General: He is not in acute distress.     Appearance: Normal appearance. He is obese. He is not ill-appearing, toxic-appearing or diaphoretic.   HENT:      Head: Normocephalic and atraumatic.   Eyes:      Conjunctiva/sclera: Conjunctivae normal.   Cardiovascular:      Rate and Rhythm: Normal rate and regular rhythm.   Pulmonary:      Effort: Pulmonary effort is normal. No respiratory distress.      Breath sounds: No wheezing or rales.   Abdominal:      General: There is distension.      Palpations: There is no mass.      Tenderness: There is no abdominal tenderness. There is no guarding.      Hernia: No hernia is present.   Skin:     General: Skin is warm and dry.   Neurological:      General: No focal deficit present.      Mental Status: He is alert.   Psychiatric:         Mood and Affect: Mood normal.         Thought Content: Thought content normal.         Judgment: Judgment normal.           Significant Labs: All pertinent labs within the past 24 hours have been reviewed.  BMP:   Recent Labs   Lab 04/04/22  0737   *   *   K 2.9*   CL 93*   CO2 29   BUN 78*   CREATININE 2.3*   CALCIUM 7.9*     CBC:   Recent Labs   Lab 04/04/22  0737   WBC 8.34   HGB 13.6*   HCT 41.2          Significant Imaging:   CXR- pulmonary edema   EKG ordered     Assessment/Plan:     * Fracture of femur  After mechanical fall at home with L femur fx. Ortho consulted. Given CAD and CHF- cards was consulted. Echo EKG ordered.  Follow neuro recs       Cirrhosis of liver with ascites  Unknown cause-- they say it is from CHF  (?)   He gets paracentesis every Thursday.       Hypokalemia  Replacing       Acute systolic CHF (congestive heart failure)  Possible- some pulmonary edema on echo. Will give dose of lasix.  Echo /cards consult       Anemia of chronic disease  No acute issues       ICD (implantable cardioverter-defibrillator), single, in situ  Noted       Aortic atherosclerosis  No acute issues       Ischemic cardiomyopathy  Cards consulted   No CP or SOB       CKD stage 3 due to type 2 diabetes mellitus  At baseline. BMP in am       Debility  Wheel chair bound.  Learning to walk out patient       Hepatosplenomegaly  Patient has cirrhosis       PVD (peripheral vascular disease)  On plavix   Followed by vascular      Coronary artery disease involving native coronary artery of native heart without angina pectoris  On Plavix  Cards consulted       Essential hypertension  No acute issues       Tophaceous gout  Resume allopurinol      DM type 2 with diabetic peripheral neuropathy  Uncontrolled with hyperglycemia. Manifestations of neuropathy and CKD         VTE Risk Mitigation (From admission, onward)         Ordered     enoxaparin injection 30 mg  Daily         04/04/22 2525                   Roberto Lazcano MD  Department of Hospital Medicine   [facility] - [place] - Telemetry

## 2022-04-04 NOTE — ASSESSMENT & PLAN NOTE
After mechanical fall at home with L femur fx. Ortho consulted. Given CAD and CHF- cards was consulted. Echo EKG ordered.  Follow neuro recs

## 2022-04-04 NOTE — TELEPHONE ENCOUNTER
No new care gaps identified.  Powered by [employer] by [employer]. Reference number: [id number].   4/04/2022 8:11:31 AM CDT

## 2022-04-04 NOTE — CONSULTS
63yo male with a sig pmhx of CAD, DM, HTN, CAD, CKD, liver cirrhosis with ascites and a sig pshx of right BKA presented to the ED with a CC of left hip pain following a mechanical fall at his home while using the toilet. He has just started prosthesis treatment for his right LE.  VSSAF    Left hip: Skin intact. + TTP on lateral hip. Limited ROM due to pain. Confirms sensation throughout left LE. Able to flex and extend his ankle and wiggle all toes. DP pulse appreciated.   XRay left femur: Comminuted and displaced intertrochanteric fracture of the left femur.   Hct: 41.2    A/P: Left subtrochanteric femur fracture  1) NPO at midnight tonight  2) to OR tomorrow for left femur IMN if cleared by cardiology

## 2022-04-04 NOTE — ED PROVIDER NOTES
Encounter Date: 4/4/2022       History     Chief Complaint   Patient presents with    Fall     Pt here via [facility] with c/o left leg pain after fall while trying to pull his pants up, pt usually in a wheelchair, has right leg amputation      HPI     62-year-old male with past medical history of CKD, CAD, diabetes, status post right BKA presents after fall around 1 hour prior to arrival.  Patient reports he was using the restroom, was standing up to get off the commode, was attempted pull his pants up and reports having difficulty in getting caught up in the very small bathroom.  He reports losing his balance, falling onto his left side and left hip.  Reports being unable to get up after this and needed assistance.  Reports ongoing pain on the left hip, denies any other areas of injury, states he did not get lightheaded or dizzy, did not hit his head, did not lose consciousness, denies any further complaints.    Review of patient's allergies indicates:   Allergen Reactions    Statins-hmg-coa reductase inhibitors      Generalized Pain    Onglyza [saxagliptin]     Penicillins Rash     Past Medical History:   Diagnosis Date    Arthritis     Cellulitis     CKD (chronic kidney disease), stage III     Coronary artery disease     Diabetes mellitus     Diabetic retinopathy     Diabetic ulcer of left foot     Glaucoma     Gout     Hyperlipemia     Hypertension     ICD (implantable cardioverter-defibrillator) in place 11/02/2018    Left chest    Non-pressure chronic ulcer of other part of left foot with fat layer exposed 10/23/2018    PVD (peripheral vascular disease)     Type 2 diabetes mellitus with left diabetic foot ulcer 10/29/2018    Unsteady gait     uses a wheelchair     Past Surgical History:   Procedure Laterality Date    AHMED GLAUCOMA IMPLANT Left 2011    DONE AT [facility]    AORTOGRAPHY WITH SERIALOGRAPHY Left 4/30/2019    Procedure: AORTOGRAM, WITH SERIALOGRAPHY, LEFT LOWER EXTREMITY, POSSIBLE  INTERVENTION;  Surgeon: Mitch Chan MD;  Location: [facility] OR;  Service: Vascular;  Laterality: Left;  RN PRE OP 4-23-19.  NO H & P, No Cosent  [place]    AORTOGRAPHY WITH SERIALOGRAPHY Right 10/6/2020    Procedure: AORTOGRAM, WITH SERIALOGRAPHY, RIGHT LOWER EXTREMITY ANGIOGRAM, POSSIBLE INTERVENTION;  Surgeon: Mitch Chan MD;  Location: [facility] OR;  Service: Vascular;  Laterality: Right;  RN PREOP 10/1/2020--COVID NEGATIVE ON 10/5    AORTOGRAPHY WITH SERIALOGRAPHY Right 1/13/2021    Procedure: AORTOGRAM, WITH RIGHT LOWER EXTREMITY ANGIOGRAM, POSSIBLE INTERVENTION;  Surgeon: Mitch Chan MD;  Location: [facility] OR;  Service: Vascular;  Laterality: Right;  1PM START----NEED CONSENT  RN PREOP 1/8/2021--COVID NEGATIVE ON 1/12    AORTOGRAPHY WITH SERIALOGRAPHY Right 1/26/2021    Procedure: AORTOGRAM, RIGHT LOWER EXTREMITY ANGIOGRAM, POSSIBLE INTERVENTION;  Surgeon: Mitch Chan MD;  Location: [facility] OR;  Service: Vascular;  Laterality: Right;  RN PREOP 1/25/2021, COVID NEGATIVE ON 1/25, cxr, and ecg done    AORTOGRAPHY WITH SERIALOGRAPHY Right 3/30/2021    Procedure: AORTOGRAM, WITH RIGHT LOWER EXTREMITY ANGIOGRAM, POSSIBLE INTERVENTION;  Surgeon: Mitch Chan MD;  Location: [facility] OR;  Service: Vascular;  Laterality: Right;  RN PREOP 3/26/2021  COVID NEGATIVE    BAERVELDT GLAUCOMA IMPLANT Left 2012    WITH CATARACT EXTRACTION//DONE AT [facility]    BELOW KNEE AMPUTATION OF LOWER EXTREMITY Right 5/17/2021    Procedure: AMPUTATION, BELOW KNEE;  Surgeon: Christiana Pritchett MD;  Location: [facility] OR;  Service: Orthopedics;  Laterality: Right;  WOUND VAC -PREVENA  SUPINE---HAS--ICD- Iast interrogation -3/21  AMPUTATION TRAY  1ST CASE FLORA WHITLOCK  RN PREOP 5/13/2021     COVID --NEGATIVE ON 5/14---INCOMPLETE H/P    BIOPSY Right 12/18/2020    Procedure: Biopsy- trocar biopsy;  Surgeon: Brianna Champion DPM;  Location: [facility] OR;  Service: Podiatry;  Laterality: Right;    CARDIAC CATHETERIZATION Left  05/2016    CARDIAC DEFIBRILLATOR PLACEMENT Left 11/02/2018    CATARACT EXTRACTION W/  INTRAOCULAR LENS IMPLANT Left 2012    WITH BAERVEDT (DONE AT [facility])    CATARACT EXTRACTION W/  INTRAOCULAR LENS IMPLANT Right 09/26/2018    COMPLEX ()    CB DESTRUCTION WITH CYCLO G6 Left 02/15/2017        CYST REMOVAL      DEBRIDEMENT OF FOOT  12/28/2018    Procedure: DEBRIDEMENT, FOOT;  Surgeon: Mitch Wall MD;  Location: [facility] OR;  Service: Vascular;;    DEBRIDEMENT OF FOOT  6/5/2019    Procedure: DEBRIDEMENT, FOOT;  Surgeon: Mitch Wall MD;  Location: [facility] OR;  Service: Vascular;;    DEBRIDEMENT OF FOOT Right 3/5/2021    Procedure: DEBRIDEMENT, FOOT with Misonic device;  Surgeon: Mitch Wall MD;  Location: [facility] OR;  Service: Vascular;  Laterality: Right;    EXAMINATION UNDER ANESTHESIA Left 12/5/2018    Procedure: Exam under anesthesia, left foot debridement, washout and all other indicated procedures;  Surgeon: Mitch Wall MD;  Location: [facility] OR;  Service: Vascular;  Laterality: Left;  1030AM START  RN PREOP 12/3/2018-----AICD  SEEN BY DR JAMES 12/4    EXAMINATION UNDER ANESTHESIA Left 2/13/2019    Procedure: LEFT FOOT WOUND DEBRIDEMENT, WASHOUT AND ALL OTHER INDICATED PROCEDURES;  Surgeon: Mitch Wall MD;  Location: [facility] OR;  Service: Vascular;  Laterality: Left;  requesting 1st case start  THIS CASE 1ST PER DR WALL ON 2/1/19 @ 844AM -LO  RN PREOP 2/6/2019  HAS CARDIAC CLEARANCE    EXAMINATION UNDER ANESTHESIA Left 12/28/2018    Procedure: Exam under anesthesia, left foot debridement, left foot washout, possible left second through fifth metatarsal resection;  Surgeon: Mitch Wall MD;  Location: [facility] OR;  Service: Vascular;  Laterality: Left;  1:00pm start per julissa @ 8:58am  RN  PHONE PRE OP 12-27-18--=Pt coming from hospitals on [address]  NEED CONSENT    EXAMINATION UNDER ANESTHESIA Left 6/5/2019    Procedure: LEFT FOOT  DEBRIDEMENT, WASHOUT AND ALL OTHER INDICATED PROCEDURES;  Surgeon: Mitch Chan MD;  Location: [facility] OR;  Service: Vascular;  Laterality: Left;  RN PRE OP 5-31-19------ICD------NEED CONSENT    EXAMINATION UNDER ANESTHESIA Right 11/9/2020    Procedure: RIGHT FOOT DEBRIDEMENT, WASHOUT AND ALL OTHER INDICATED PROCEDURES;  Surgeon: Mitch Chan MD;  Location: [facility] OR;  Service: Vascular;  Laterality: Right;  RN PREOP 10/27/2020, --COVID NEGATIVE ON 11/6, has cardiac clearance/[place] 10/27/2020, pt has ICD  NEEDS ORDERS    EXAMINATION UNDER ANESTHESIA Right 2/4/2021    Procedure: RIGHT FOOT DEBRIDEMENT, POSSIBLE CALCANECTOMY, POSSIBLE FIFTH TOE AMPUTATION, WASHOUT AND ALL OTHER INDICATED PROCEDURES;  Surgeon: Mitch Chan MD;  Location: [facility] OR;  Service: Vascular;  Laterality: Right;  RN PREOP 2/2/2021--COVID NEGATIVE ON 2/2  ICD    EYE SURGERY      CAT EXT OU    FOOT AMPUTATION THROUGH METATARSAL Right 3/5/2021    Procedure: Right foot wound debridement, possible transmetatarsal amputation, possible fifth toe amputation, washout;  Surgeon: Mitch Chan MD;  Location: [facility] OR;  Service: Vascular;  Laterality: Right;  MISONIX SONIC ONE JAYDEN LOUISE [phone] SPOKE TO HIM ON MY OFFICE @ 7:31AM ON 2-  RN PRE Op, Covid NEGATIVE ON  3-2-21.  C A  8:30AM START----NEED H/P    INCISION AND DRAINAGE Left 11/14/2018    Procedure: Incision and Drainage, left lower extremity debridement, washout;  Surgeon: Mitch Chan MD;  Location: [facility] OR;  Service: Vascular;  Laterality: Left;    INCISION AND DRAINAGE Left 11/16/2018    Procedure: INCISION AND DRAINAGE;  Surgeon: Mitch Chan MD;  Location: [facility] OR;  Service: Vascular;  Laterality: Left;    INCISION AND DRAINAGE Right 11/18/2020    Procedure: Debridement and washout right lower extremity wounds;  Surgeon: Mitch Chan MD;  [facility];  Service: Vascular;  Laterality: Right;    INCISION AND  DRAINAGE Right 1/27/2021    Procedure: RIGHT FOOT DEBRIDEMENT, WASHOUT AND ALL OTHER INDICATED PROCEDURES;  Surgeon: Mitch Chan MD;  Location: [facility] OR 2ND FLR;  Service: Vascular;  Laterality: Right;    INTRAOCULAR PROSTHESES INSERTION Right 9/26/2018    Procedure: INSERTION, IOL PROSTHESIS;  Surgeon: Perla Cortés MD;  Location: [facility] OR 1ST FLR;  Service: Ophthalmology;  Laterality: Right;    PERITONEOCENTESIS N/A 3/1/2019    Procedure: PARACENTESIS, ABDOMINAL, INITIAL;  Surgeon: Dosc Diagnostic Provider;  [facility]: [facility];  Service: Radiology;  Laterality: N/A;    PERITONEOCENTESIS N/A 3/25/2019    Procedure: PARACENTESIS, ABDOMINAL, INITIAL;  Surgeon: Dosc Diagnostic Provider;  Location: [facility] OR;  Service: Radiology;  Laterality: N/A;    PERITONEOCENTESIS N/A 7/22/2019    Procedure: PARACENTESIS, ABDOMINAL, INITIAL;  Surgeon: Dosc Diagnostic Provider;  Location: [facility];  Service: Radiology;  Laterality: N/A;    PERITONEOCENTESIS N/A 8/16/2019    Procedure: PARACENTESIS, ABDOMINAL, INITIAL;  Surgeon: Dosc Diagnostic Provider;  [facility]: [facility];  Service: Radiology;  Laterality: N/A;    PERITONEOCENTESIS N/A 9/26/2019    Procedure: PARACENTESIS, ABDOMINAL;  Surgeon: Dosc Diagnostic Provider;  [facility];  Service: Radiology;  Laterality: N/A;    PERITONEOCENTESIS N/A 10/11/2019    Procedure: PARACENTESIS, ABDOMINAL;  Surgeon: Dosc Diagnostic Provider;  Location: [facility] OR;  Service: Radiology;  Laterality: N/A;    PERITONEOCENTESIS N/A 10/31/2019    Procedure: PARACENTESIS, ABDOMINAL;  Surgeon: Dosc Diagnostic Provider;  [facility];  Service: Radiology;  Laterality: N/A;    PERITONEOCENTESIS N/A 11/18/2019    Procedure: PARACENTESIS, ABDOMINAL;  Surgeon: Dosc Diagnostic Provider;  [facility];  Service: Radiology;  Laterality: N/A;    PERITONEOCENTESIS N/A 12/16/2019    Procedure: PARACENTESIS, ABDOMINAL;  Surgeon: Dosc Diagnostic Provider;  [facility];   Service: Radiology;  Laterality: N/A;  2PM START    PERITONEOCENTESIS N/A 2/7/2020    Procedure: PARACENTESIS, ABDOMINAL;  Surgeon: Dosc Diagnostic Provider;  Location: [facility] OR;  Service: Radiology;  Laterality: N/A;  REQUESTED 10:30A START    PERITONEOCENTESIS N/A 2/19/2020    Procedure: PARACENTESIS, ABDOMINAL;  Surgeon: Dosc Diagnostic Provider;  Location: [facility] OR;  Service: Radiology;  Laterality: N/A;    PERITONEOCENTESIS N/A 2/28/2020    Procedure: PARACENTESIS, ABDOMINAL;  Surgeon: Dosc Diagnostic Provider;  Location: [facility] OR;  Service: Radiology;  Laterality: N/A;  REQUESTED 10AM START    PHACOEMULSIFICATION OF CATARACT Right 9/26/2018    Procedure: PHACOEMULSIFICATION, CATARACT;  Surgeon: Perla Cortés MD;  Location: [facility] OR [address] [place] [address];  Service: Ophthalmology;  Laterality: Right;    REPEAT ABDOMINAL PARACENTESIS N/A 2/11/2019    Procedure: PARACENTESIS, ABDOMINAL, REPEAT;  Surgeon: Dosc Diagnostic Provider;  Location: [facility] OR;  Service: Radiology;  Laterality: N/A;  1PM START    REPEAT ABDOMINAL PARACENTESIS N/A 9/12/2019    Procedure: PARACENTESIS, ABDOMINAL, REPEAT;  Surgeon: Dosc Diagnostic Provider;  Location: [facility] OR;  Service: Radiology;  Laterality: N/A;  9AM START    REPEAT ABDOMINAL PARACENTESIS N/A 12/2/2019    Procedure: PARACENTESIS, ABDOMINAL, REPEAT;  Surgeon: Dosc Diagnostic Provider;  Location: [facility] OR;  Service: Radiology;  Laterality: N/A;  3PM START    REPEAT ABDOMINAL PARACENTESIS N/A 1/10/2020    Procedure: PARACENTESIS, ABDOMINAL, REPEAT;  Surgeon: Dosc Diagnostic Provider;  Location: [facility] OR;  Service: Radiology;  Laterality: N/A;  1130AM START    REPEAT ABDOMINAL PARACENTESIS N/A 1/20/2020    Procedure: PARACENTESIS, ABDOMINAL, REPEAT;  Surgeon: Dosc Diagnostic Provider;  Location: [facility] OR;  Service: Radiology;  Laterality: N/A;  1PM START    REPEAT ABDOMINAL PARACENTESIS N/A 1/31/2020    Procedure: PARACENTESIS, ABDOMINAL, REPEAT;  Surgeon: Dosc Diagnostic Provider;   Location: [facility] OR;  Service: Radiology;  Laterality: N/A;  10AM START    REPEAT ABDOMINAL PARACENTESIS N/A 3/16/2020    Procedure: PARACENTESIS, ABDOMINAL, REPEAT;  Surgeon: Dosc Diagnostic Provider;  Location: [facility] OR;  Service: Radiology;  Laterality: N/A;  10AM START    REPEAT ABDOMINAL PARACENTESIS N/A 3/30/2020    Procedure: PARACENTESIS, ABDOMINAL, REPEAT;  Surgeon: Dosc Diagnostic Provider;  Location: [facility] OR;  Service: Radiology;  Laterality: N/A;    REPEAT ABDOMINAL PARACENTESIS N/A 4/9/2020    Procedure: PARACENTESIS, ABDOMINAL, REPEAT;  Surgeon: Dos Diagnostic Provider;  Location: [facility] OR;  Service: Radiology;  Laterality: N/A;  1130AM START    REPEAT ABDOMINAL PARACENTESIS N/A 5/8/2020    Procedure: PARACENTESIS, ABDOMINAL, REPEAT;  Surgeon: Dos Diagnostic Provider;  Location: [facility] OR;  Service: Radiology;  Laterality: N/A;  9AM START    REPEAT ABDOMINAL PARACENTESIS N/A 5/21/2020    Procedure: PARACENTESIS, ABDOMINAL, REPEAT;  Surgeon: Dos Diagnostic Provider;  Location: [facility] OR;  Service: Radiology;  Laterality: N/A;  10AM START    REPEAT ABDOMINAL PARACENTESIS N/A 6/5/2020    Procedure: PARACENTESIS, ABDOMINAL, REPEAT;  Surgeon: Dos Diagnostic Provider;  Location: [facility] OR;  Service: Radiology;  Laterality: N/A;  NOON START    REPEAT ABDOMINAL PARACENTESIS N/A 7/10/2020    Procedure: PARACENTESIS, ABDOMINAL, REPEAT;  Surgeon: Dosc Diagnostic Provider;  Location: [facility] OR;  Service: Radiology;  Laterality: N/A;  1PM START    REPEAT ABDOMINAL PARACENTESIS N/A 8/20/2020    Procedure: PARACENTESIS, ABDOMINAL, REPEAT;  Surgeon: Dosc Diagnostic Provider;  Location: [facility] OR;  Service: Radiology;  Laterality: N/A;  1PM START    REPEAT ABDOMINAL PARACENTESIS N/A 9/4/2020    Procedure: PARACENTESIS, ABDOMINAL, REPEAT;  Surgeon: Dosc Diagnostic Provider;  Location: [facility] OR;  Service: Radiology;  Laterality: N/A;  11 AM START    REPEAT ABDOMINAL PARACENTESIS N/A 9/16/2020    Procedure: PARACENTESIS,  ABDOMINAL, REPEAT;  Surgeon: Dos Diagnostic Provider;  Location: [facility] OR;  Service: Radiology;  Laterality: N/A;  START 2:00 P.M.    REPEAT ABDOMINAL PARACENTESIS N/A 9/28/2020    Procedure: PARACENTESIS, ABDOMINAL, REPEAT;  Surgeon: Dos Diagnostic Provider;  [facility];  Service: Radiology;  Laterality: N/A;  1 P.M. START    REPEAT ABDOMINAL PARACENTESIS N/A 11/3/2020    Procedure: PARACENTESIS, ABDOMINAL, REPEAT;  Surgeon: Dos Diagnostic Provider;  Location: [facility] OR;  Service: Radiology;  Laterality: N/A;  11AM START    REPEAT ABDOMINAL PARACENTESIS N/A 11/13/2020    Procedure: PARACENTESIS, ABDOMINAL, REPEAT;  Surgeon: Dos Diagnostic Provider;  Location: [facility] OR;  Service: Radiology;  Laterality: N/A;  12PM START    REPEAT ABDOMINAL PARACENTESIS N/A 11/23/2020    Procedure: PARACENTESIS, ABDOMINAL, REPEAT;  Surgeon: Dosc Diagnostic Provider;  Location: [facility] OR;  Service: Radiology;  Laterality: N/A;    REPEAT ABDOMINAL PARACENTESIS N/A 12/1/2020    Procedure: PARACENTESIS, ABDOMINAL, REPEAT;  Surgeon: Dos Diagnostic Provider;  Location: [facility] OR;  Service: Radiology;  Laterality: N/A;  11AM START    REPEAT ABDOMINAL PARACENTESIS N/A 12/8/2020    Procedure: PARACENTESIS, ABDOMINAL, REPEAT;  Surgeon: Dos Diagnostic Provider;  Location: [facility] OR;  Service: Radiology;  Laterality: N/A;  93AM START    REPEAT ABDOMINAL PARACENTESIS N/A 12/17/2020    Procedure: PARACENTESIS, ABDOMINAL, REPEAT;  Surgeon: Dos Diagnostic Provider;  Location: [facility] OR;  Service: Radiology;  Laterality: N/A;  10AM START    REPEAT ABDOMINAL PARACENTESIS N/A 12/30/2020    Procedure: PARACENTESIS, ABDOMINAL, REPEAT;  Surgeon: Dosc Diagnostic Provider;  Location: [facility] OR;  Service: Radiology;  Laterality: N/A;  9 A.M. START    REPEAT ABDOMINAL PARACENTESIS N/A 1/12/2021    Procedure: PARACENTESIS, ABDOMINAL, REPEAT;  Surgeon: Dosc Diagnostic Provider;  Location: [facility];  Service: Radiology;  Laterality: N/A;  10 AM START     REPEAT ABDOMINAL PARACENTESIS N/A 2/10/2021    Procedure: PARACENTESIS, ABDOMINAL, REPEAT;  Surgeon: Dosc Diagnostic Provider;  Location: [facility] OR;  Service: Radiology;  Laterality: N/A;  2 P.M. START    REPEAT ABDOMINAL PARACENTESIS N/A 2/18/2021    Procedure: PARACENTESIS, ABDOMINAL, REPEAT;  Surgeon: Dosc Diagnostic Provider;  Location: [facility] OR;  Service: Radiology;  Laterality: N/A;  230PM START    REPEAT ABDOMINAL PARACENTESIS N/A 2/26/2021    Procedure: PARACENTESIS, ABDOMINAL, REPEAT;  Surgeon: Dosc Diagnostic Provider;  Location: [facility] OR;  Service: Radiology;  Laterality: N/A;    REPEAT ABDOMINAL PARACENTESIS N/A 3/4/2021    Procedure: PARACENTESIS, ABDOMINAL, REPEAT;  Surgeon: Dosc Diagnostic Provider;  Location: [facility] OR;  Service: Radiology;  Laterality: N/A;  9AM START    REPEAT ABDOMINAL PARACENTESIS N/A 3/12/2021    Procedure: PARACENTESIS, ABDOMINAL, REPEAT;  Surgeon: Dos Diagnostic Provider;  Location: [facility] OR;  Service: Radiology;  Laterality: N/A;  10:00 START TIME  NO PRE-OP REQUIRED    REPEAT ABDOMINAL PARACENTESIS N/A 4/7/2021    Procedure: PARACENTESIS, ABDOMINAL, REPEAT;  Surgeon: Dos Diagnostic Provider;  Location: [facility] OR;  Service: Radiology;  Laterality: N/A;  1 P.M. START    REPEAT ABDOMINAL PARACENTESIS N/A 4/16/2021    Procedure: PARACENTESIS, ABDOMINAL, REPEAT;  Surgeon: Dosc Diagnostic Provider;  Location: [facility] OR;  Service: Radiology;  Laterality: N/A;  9:OO START  NO PRE-OP REQ'D    REPEAT ABDOMINAL PARACENTESIS N/A 4/26/2021    Procedure: PARACENTESIS, ABDOMINAL, REPEAT;  Surgeon: Dosc Diagnostic Provider;  Location: [facility] OR;  Service: Radiology;  Laterality: N/A;  9AM START    REPEAT ABDOMINAL PARACENTESIS N/A 5/6/2021    Procedure: PARACENTESIS, ABDOMINAL, REPEAT;  Surgeon: Dosc Diagnostic Provider;  Location: [facility] OR;  Service: Radiology;  Laterality: N/A;  3PM START    REPEAT ABDOMINAL PARACENTESIS N/A 5/14/2021    Procedure: PARACENTESIS, ABDOMINAL, REPEAT;  Surgeon:  [facility] Diagnostic Provider;  Location: [facility] OR;  Service: Radiology;  Laterality: N/A;  1:00PM START  NO PRE-OP REQ'D    REPEAT ABDOMINAL PARACENTESIS N/A 5/24/2021    Procedure: PARACENTESIS, ABDOMINAL, REPEAT;  Surgeon: [facility] Diagnostic Provider;  Location: [facility] OR;  Service: Radiology;  Laterality: N/A;  2 P.M START TIME    REPEAT ABDOMINAL PARACENTESIS N/A 6/4/2021    Procedure: PARACENTESIS, ABDOMINAL, REPEAT;  Surgeon: [facility] Diagnostic Provider;  Location: [facility] OR;  Service: Radiology;  Laterality: N/A;  11AM START    REPEAT ABDOMINAL PARACENTESIS N/A 6/14/2021    Procedure: PARACENTESIS, ABDOMINAL, REPEAT;  Surgeon: [facility] Diagnostic Provider;  Location: [facility] OR;  Service: Radiology;  Laterality: N/A;  8AM START    REPEAT ABDOMINAL PARACENTESIS N/A 6/23/2021    Procedure: PARACENTESIS, ABDOMINAL, REPEAT;  Surgeon: [facility] Diagnostic Provider;  Location: [facility] OR;  Service: Radiology;  Laterality: N/A;    REVISION OF PROCEDURE INVOLVING GLAUCOMA DRAINAGE DEVICE Left 10/2/2019    EXTRUDING BAERVELDT /WITH PERICARDIAL PATCH GRAFT ()    Right foot surgery  10/2014    TOE AMPUTATION Right     first and second    TOE AMPUTATION Left 11/16/2018    Procedure: AMPUTATION, TOES  2-5;  Surgeon: Mitch Chan MD;  Location: [facility] OR;  Service: Vascular;  Laterality: Left;    TOE AMPUTATION Left 12/5/2018    Procedure: AMPUTATION, TOE;  Surgeon: Mitch Chan MD;  [facility];  Service: Vascular;  Laterality: Left;  left great toe    TONSILLECTOMY       Family History   Problem Relation Age of Onset    Diabetes Mother     Heart disease Brother     No Known Problems Father     No Known Problems Sister     No Known Problems Maternal Aunt     No Known Problems Maternal Uncle     No Known Problems Paternal Aunt     No Known Problems Paternal Uncle     No Known Problems Maternal Grandmother     No Known Problems Maternal Grandfather     No Known Problems Paternal Grandmother     No  Known Problems Paternal Grandfather     Anemia Neg Hx     Arrhythmia Neg Hx     Asthma Neg Hx     Clotting disorder Neg Hx     Fainting Neg Hx     Heart attack Neg Hx     Heart failure Neg Hx     Hyperlipidemia Neg Hx     Hypertension Neg Hx     Stroke Neg Hx     Atrial Septal Defect Neg Hx     Amblyopia Neg Hx     Blindness Neg Hx     Glaucoma Neg Hx     Macular degeneration Neg Hx     Retinal detachment Neg Hx     Strabismus Neg Hx      Social History     Tobacco Use    Smoking status: Former Smoker     Packs/day: 1.00     Years: 3.00     Pack years: 3.00     Types: Cigarettes     Quit date: 1984     Years since quittin.7    Smokeless tobacco: Never Used   Substance Use Topics    Alcohol use: Not Currently     Alcohol/week: 1.0 standard drink     Types: 1 Cans of beer per week     Comment: rarely    Drug use: No     Review of Systems   Constitutional: Negative.    HENT: Negative.    Eyes: Negative.    Respiratory: Negative.    Cardiovascular: Negative.    Gastrointestinal: Negative.    Genitourinary: Negative.    Musculoskeletal:        Left hip pain   Skin: Negative.    Neurological: Negative.        Physical Exam     Initial Vitals   BP Pulse Resp Temp SpO2   22 0610 22 0610 22 0610 22 0611 22 0610   138/64 76 18 98.4 °F (36.9 °C) 95 %      MAP       --                Physical Exam    Nursing note and vitals reviewed.  Constitutional: He appears well-developed and well-nourished. He is not diaphoretic. No distress.   HENT:   Head: Normocephalic and atraumatic.   Nose: Nose normal.   Mouth/Throat: No oropharyngeal exudate.   Eyes: EOM are normal. Pupils are equal, round, and reactive to light.   Neck: Neck supple. No tracheal deviation present. No JVD present.   Normal range of motion.  Cardiovascular: Normal rate, regular rhythm, normal heart sounds and intact distal pulses.   Pulmonary/Chest: Breath sounds normal. No respiratory distress. He has no  wheezes. He has no rhonchi. He has no rales.   Abdominal: Abdomen is soft. Bowel sounds are normal. He exhibits distension (Mild). There is no abdominal tenderness. There is no rebound and no guarding.   Musculoskeletal:         General: Tenderness present. No edema.      Cervical back: Normal range of motion and neck supple.      Comments: Right BKA, tenderness over left lateral hip, involving greater trochanter, postoperative shoe and bandages clean and dry over the left foot, pain with logroll.     Neurological: He is alert and oriented to person, place, and time. He has normal strength.   Skin: Skin is warm and dry. Capillary refill takes less than 2 seconds. No rash noted. No erythema.         ED Course   Procedures  Labs Reviewed   CBC W/ AUTO DIFFERENTIAL - Abnormal; Notable for the following components:       Result Value    RBC 4.22 (*)     Hemoglobin 13.6 (*)     MCH 32.2 (*)     RDW 17.2 (*)     Lymph # 0.2 (*)     Gran % 84.8 (*)     Lymph % 2.5 (*)     All other components within normal limits   COMPREHENSIVE METABOLIC PANEL - Abnormal; Notable for the following components:    Sodium 134 (*)     Potassium 2.9 (*)     Chloride 93 (*)     Glucose 306 (*)     BUN 78 (*)     Creatinine 2.3 (*)     Calcium 7.9 (*)     Total Protein 5.5 (*)     Albumin 2.4 (*)     eGFR if  33 (*)     eGFR if non  29 (*)     All other components within normal limits   SARS-COV-2 RDRP GENE - Normal   PROTIME-INR          Imaging Results           X-Ray Femur Ap/Lat Left (Final result)  Result time 04/04/22 08:27:52    Final result by Brandin Mohamud MD (04/04/22 08:27:52)                 Impression:      Comminuted and displaced intertrochanteric fracture of the left femur.    This report was flagged in Epic as abnormal.      Electronically signed by: Brandin Mohamud  Date:    04/04/2022  Time:    08:27             Narrative:    EXAMINATION:  XR FEMUR 2 VIEW LEFT    CLINICAL HISTORY:  Fracture of  unspecified part of neck of left femur, initial encounter for closed fracture    TECHNIQUE:  AP and lateral views of the left femur were performed.    COMPARISON:  None}    FINDINGS:  Comminuted intertrochanteric fracture the left femur, with involvement of the lesser trochanter.  There is impaction and apex lateral angulation.  No hip dislocation.    Diffuse osteopenia.  No additional fractures are identified.  Mild degenerative changes in the knee.  Small knee joint effusion.                               X-Ray Chest AP Portable (Final result)  Result time 04/04/22 08:31:19    Final result by Brandin Mohamud MD (04/04/22 08:31:19)                 Impression:      Interstitial pulmonary edema and small right pleural effusion.      Electronically signed by: Brandin Mohamud  Date:    04/04/2022  Time:    08:31             Narrative:    EXAMINATION:  XR CHEST AP PORTABLE    CLINICAL HISTORY:  pre-operative;    TECHNIQUE:  Single frontal view of the chest was performed.    COMPARISON:  Chest radiograph 08/14/2021    FINDINGS:  Lines and tubes: Left chest wall single lead AICD.    Heart and mediastinum: The cardiac silhouette appears enlarged.    Pleura: Small right pleural effusion.  No pneumothorax.    Lungs: Lung volumes are low.  There is pulmonary vascular indistinctness and perihilar fullness.  No focal consolidation.    Soft tissue/bone: Scattered degenerative changes.  No acute osseous abnormality                               X-Ray Hip 2 or 3 views Left (with Pelvis when performed) (Final result)  Result time 04/04/22 07:38:53    Final result by Yeyo Crystal MD (04/04/22 07:38:53)                 Impression:      Left femoral acute intertrochanteric fracture, as above.      Electronically signed by: Yeyo Crystal MD  Date:    04/04/2022  Time:    07:38             Narrative:    EXAMINATION:  XR HIP WITH PELVIS WHEN PERFORMED, 2 OR 3 VIEWS LEFT    CLINICAL HISTORY:  Pain in left hip    TECHNIQUE:  AP view of the  pelvis and frog leg lateral view of the left hip were performed.    COMPARISON:  CT abdomen and pelvis 10/16/2018    FINDINGS:  Acute fracture through the left femoral intertrochanteric region with mild impaction and mild varus angulation of the femoral shaft with respect to the fracture site.  Additionally, the lesser trochanter is mildly displaced medially up to 1 cm with slight angulation.  Fracture planes also extend to the greater trochanter without significant displacement.  No dislocation or destructive osseous process.  Mild degenerative change at the bilateral hips and pelvis.  Scattered atherosclerotic vascular calcifications and pelvic phleboliths noted.  No subcutaneous emphysema or radiodense retained foreign body.                                 Medications   oxyCODONE-acetaminophen 7.5-325 mg per tablet 1 tablet (1 tablet Oral Given 4/4/22 1109)   mupirocin 2 % ointment (has no administration in time range)   glucose chewable tablet 16 g (has no administration in time range)   glucose chewable tablet 24 g (has no administration in time range)   glucagon (human recombinant) injection 1 mg (has no administration in time range)   insulin aspart U-100 pen 1-10 Units (has no administration in time range)   enoxaparin injection 30 mg (30 mg Subcutaneous Given 4/4/22 1607)   dextrose 10% bolus 125 mL (has no administration in time range)   dextrose 10% bolus 250 mL (has no administration in time range)   allopurinoL tablet 300 mg (300 mg Oral Given 4/4/22 1429)   aspirin EC tablet 81 mg (81 mg Oral Given 4/4/22 1429)   brimonidine 0.2% ophthalmic solution 1 drop (1 drop Both Eyes Given 4/4/22 1617)   carvediloL tablet 3.125 mg (3.125 mg Oral Given 4/4/22 1617)   clopidogreL tablet 75 mg (75 mg Oral Given 4/4/22 1608)   collagenase ointment ( Topical (Top) Given 4/4/22 1608)   dorzolamide-timolol 2-0.5% ophthalmic solution 1 drop (1 drop Both Eyes Given 4/4/22 1617)   ezetimibe tablet 10 mg (10 mg Oral Given  4/4/22 1429)   gabapentin capsule 200 mg (has no administration in time range)   metOLazone tablet 2.5 mg (2.5 mg Oral Given 4/4/22 1608)   torsemide tablet 20 mg (20 mg Oral Given 4/4/22 1429)   HYDROcodone-acetaminophen 5-325 mg per tablet 1 tablet (1 tablet Oral Given 4/4/22 0621)   potassium chloride SA CR tablet 60 mEq (60 mEq Oral Given 4/4/22 1429)   furosemide injection 80 mg (80 mg Intravenous Given 4/4/22 1429)                 ED Course as of 04/04/22 1627   Mon Apr 04, 2022   0733 EKG - 0725, nsr rate 78 bpm, prolonged qtc 538 ms, non-specific ST/T wave changes noted, no STEMI. [BB]      ED Course User Index  [BB] Benedict Kelley MD          MDM:    62-year-old male with past medical history of CKD, CAD, diabetes, status post right BKA presents after fall around 1 hour prior to arrival.  Physical exam as noted above.  ED workup notable for INR 1.0, COVID negative, hemoglobin baseline 13.6, x-ray of left hip showing left intertrochanteric fracture.  Patient presentation consistent with nonsyncopal fall with left hip fracture, discussed with orthopedics will attempt operative intervention later today.  Distal sensation not intact even at baseline, normal sensory deficits noted, some sensation around his left knee.  Difficult to appreciate palpable pulses in his left midfoot, cap refill however remaining around 3 seconds which is presumptively near his baseline given his history of peripheral vascular disease.  Doubt any neurovascular emergency at this point.  Given his findings, discussed further with Hospital Medicine who will admit for further evaluation and management. Discussed diagnosis and further treatment with patient and family at bedside. All questions answered, patient transferred to floor improved and stable.         Clinical Impression:   Final diagnoses:  [M25.552] Left hip pain  [S72.002A] Closed left hip fracture  [W19.XXXA] Fall          ED Disposition Condition    Admit                Benedict Kelley MD  04/04/22 8123

## 2022-04-04 NOTE — HPI
Mr. Ray is a 61 yo M who presents after a mechanical fall in the bathroom with a CC of L leg pain. He was found to have L femur fracture. Patient has a known history of CAD and systolic heart failure with a ICD and EF of 20%.  His xray was read as pulmonary edema although he states he is not SOB at all and has not had a CHF exacerbation in many years. He as liver cirrhosis with ascites that gets drained every Thursday.  He is s/p R BKA and is mainly wheelchair bound but is going to out patient PT/OT 3 times a week to learn to walk with a prosthesis.

## 2022-04-05 PROBLEM — Z51.5 PALLIATIVE CARE ENCOUNTER: Status: ACTIVE | Noted: 2018-12-14

## 2022-04-05 PROBLEM — N18.9 ACUTE-ON-CHRONIC KIDNEY INJURY: Status: ACTIVE | Noted: 2018-11-08

## 2022-04-05 NOTE — ASSESSMENT & PLAN NOTE
After mechanical fall at home with L femur fx.   XR left hip/femur: Comminuted and displaced intertrochanteric fracture of the left femur  Given CAD and CHF, cards was consulted- pt high risk for surgery   Echo with EF of 15%, G3DD, pHTN  Ortho consulted- plan for surgery 04/05

## 2022-04-05 NOTE — OR NURSING
1308 Compresession start  1310 pulse check, no palpable pulse, resume compressions  1311 compressions, 1 amp epi given IV push  1312  pulse check, no palpable pulse, resume compressions  1314  pulse check, no palpable pulse, resume compressions  1314 compressions, 1 amp epi given IV push  1315 pulse check, palpable pulse, compressions ceased    1330 pulse check, no palpable pulse, resume compressions  1332 pulse check, no palpable pulse, resume compressions  1333 1 amp epi given IV push  1334 pulse check, no palpable pulse, resume compressions  1335 1 amp Bicarb given IV push  1336 pulse check, palpable pulse, compressions ceased  1338 transport to ICU by jerome Ko, david rivera, md yen

## 2022-04-05 NOTE — NURSING
Pt arrived to the ICU on EPI drip. Pt went into V-Tach when hooked up to monitor. Code BLUE called overhead. eICU contacted. Chest compressions initiated. Dr Calderon at bedside. See eICU notes for code documentation.

## 2022-04-05 NOTE — SUBJECTIVE & OBJECTIVE
Interval History: No acute overnight events. Blood pressure low-normal and MAP >65. No signs of bleeding. Continues to required supplemental O2. Denies feeling short of breath. Complains of left hip pain. No new numbness or tingling. Wife at bedside.     Review of Systems   Constitutional:  Positive for activity change. Negative for chills and fever.   HENT:  Negative for congestion, sinus pain and trouble swallowing.    Eyes:  Negative for photophobia and visual disturbance.   Respiratory:  Negative for cough, choking, shortness of breath and wheezing.    Cardiovascular:  Negative for chest pain, palpitations and leg swelling.   Gastrointestinal:  Positive for abdominal distention. Negative for abdominal pain, diarrhea, nausea and vomiting.   Genitourinary:  Negative for difficulty urinating, dysuria and hematuria.   Musculoskeletal:  Positive for arthralgias and gait problem (hx of right BKA).   Skin:  Negative for rash.   Neurological:  Negative for dizziness, weakness, numbness and headaches.   Psychiatric/Behavioral:  Positive for agitation. Negative for confusion and hallucinations.    Objective:     Vital Signs (Most Recent):  Temp: 98.2 °F (36.8 °C) (04/05/22 0742)  Pulse: 65 (04/05/22 1000)  Resp: 20 (04/05/22 1000)  BP: 98/61 (04/05/22 1000)  SpO2: (!) 93 % (04/05/22 1000)   Vital Signs (24h Range):  Temp:  [98.1 °F (36.7 °C)-98.7 °F (37.1 °C)] 98.2 °F (36.8 °C)  Pulse:  [65-84] 65  Resp:  [16-20] 20  SpO2:  [92 %-98 %] 93 %  BP: ()/(48-61) 98/61     Weight: 84.4 kg (186 lb)  Body mass index is 26.69 kg/m².    Intake/Output Summary (Last 24 hours) at 4/5/2022 1043  Last data filed at 4/5/2022 0600  Gross per 24 hour   Intake --   Output 450 ml   Net -450 ml      Physical Exam  Constitutional:       General: He is not in acute distress.     Appearance: He is obese. He is not ill-appearing, toxic-appearing or diaphoretic.   HENT:      Head: Atraumatic.      Right Ear: External ear normal.      Left  Ear: External ear normal.      Nose: Nose normal.      Mouth/Throat:      Mouth: Mucous membranes are dry.   Eyes:      Extraocular Movements: Extraocular movements intact.      Conjunctiva/sclera: Conjunctivae normal.   Cardiovascular:      Rate and Rhythm: Normal rate and regular rhythm.      Heart sounds: No murmur heard.    No gallop.   Pulmonary:      Effort: Pulmonary effort is normal. No respiratory distress.      Breath sounds: Normal breath sounds. No wheezing.   Abdominal:      General: There is distension.      Palpations: Abdomen is soft.      Tenderness: There is no abdominal tenderness. There is no guarding.   Musculoskeletal:         General: Deformity (right BKA) present. No swelling or tenderness.      Left lower leg: No edema.      Comments: Left LLE/Hip: Tender to palpation on on left lateral hip. Limited ROM due to pain. Able to flex and extend his ankle and wiggle all toes. DP pulse appreciated.   Right elbow with swelling (gout), nontender on exam. No erythema   Skin:     General: Skin is warm and dry.   Neurological:      General: No focal deficit present.      Mental Status: He is alert and oriented to person, place, and time.   Psychiatric:      Comments: Patient appears anxious and frustrated       Significant Labs: All pertinent labs within the past 24 hours have been reviewed.    Significant Imaging: I have reviewed all pertinent imaging results/findings within the past 24 hours.

## 2022-04-05 NOTE — TRANSFER OF CARE
"Anesthesia Transfer of Care Note    Patient: Marvin Ray    Procedure(s) Performed: Procedure(s) (LRB):  INSERTION, INTRAMEDULLARY HERMAN, FEMUR GAMMA NAIL, JOEL (Left)    Patient location: ICU    Anesthesia Type: general    Transport from OR: Transported from OR intubated on 100% O2 by AMBU with adequate controlled ventilation. Upon arrival to PACU/ICU, patient attached to ventilator and auscultated to confirm bilateral breath sounds and adequate TV. Continuous ECG monitoring in transport. Continuous SpO2 monitoring in transport. Continuos invasive BP monitoring in transport    Post pain: other: pt unresponsive    Post-procedure assessment: pt code situation.    Post vital signs: unstable    Level of consciousness: unresponsive    Nausea/Vomiting: no nausea/vomiting    Complications: respiratory complications, cardiovascular complications, other (see comments), pt in code situation    Transfer of care protocol was followed      Last vitals:   Visit Vitals  BP (S) (!) 108/58 (BP Location: Other (Comment), Patient Position: Lying)   Pulse 90   Temp 36.5 °C (97.7 °F) (Skin)   Resp 15   Ht 5' 10" (1.778 m)   Wt 84.4 kg (186 lb)   SpO2 (!) 88%   BMI 26.69 kg/m²     "

## 2022-04-05 NOTE — TELEPHONE ENCOUNTER
Refill Routing Note   Medication(s) are not appropriate for processing by [facility] for the following reason(s):      - Patient has not been seen in over 15 months by PCP    [facility] action(s):  Defer  Route          Medication reconciliation completed: No     Appointments  past 12m or future 3m with PCP    Date Provider   Last Visit   3/20/2019 Rubén Davis MD   Next Visit   4/12/2022 Rubén Davis MD   ED visits in past 90 days: 0        Note composed:3:46 PM 04/05/2022

## 2022-04-05 NOTE — ASSESSMENT & PLAN NOTE
A1c:   Lab Results   Component Value Date    HGBA1C 8.7 (H) 01/07/2022     Meds: SSI PRN to maintain goal 140-180  ADA diet, accuchecks ACHS, hypoglycemic protocol  Uncontrolled with hyperglycemia.   Manifestations of neuropathy and CKD   Consider basal insulin post-operatively

## 2022-04-05 NOTE — CARE UPDATE
[facility] MEWS TRIGGER FOLLOW UP       MEWS Monitoring, Score is: 3  Indication for review: BP  RRN made aware of pt during MEWS score review. BP 86/48. RRN rechecked BP - 100/60    Please call RRN at [phone] if needed.

## 2022-04-05 NOTE — SIGNIFICANT EVENT
"JARON villa called overhead. Was contacted and arrived to patient's bedside.  ICU team running the code. Of note, patient coded twice in the OR but was able to achieve ROSC. Patient arrived to ICU on epi drip. Patient rhythm went into VTach and code blue was called and compressions started.    Discussed with patient's wife in the Chapel with Palliative care team (Delmis Hobbs).  Informed the patient's sister that patient is coding again, likely due to cardiac arrest given CHF with worsening EF of 15%.  Discussed that his CHF likely worsened intraoperatively. Sister confirmed that patient made it very clear that if this was to happen again and that if patient is unable to return back to his normal self or required life support, that the patient would not want this.  She states that the patient said that "I do not want to be a vegetable, no matter what." After careful consideration, the patient's sister agree to stopped attempts at resuscitation and make him DNR. States that the patient would not want to continue. States "it's God's Will".      Discussed with rapid/code team of the patient's sister wishes and expressed that the patient would not want this continued. Time of death was called at 14:12    No spontaneous movements were present.  There was no response to verbal or tactile stimuli.  No breath sounds were appreciated over either lung field.  No carotid pulses were palpable.  No radial pulses were palpable.  No heart sounds were auscultated over the entire precordium.      Patient pronounced dead at 14:12 on 04/05/2022.  Family at bedside.  Emotional support provided. Likely cause of death cardiac arrest due to worsening heart failure intraoperatively.       "

## 2022-04-05 NOTE — PLAN OF CARE
Problem: Adult Inpatient Plan of Care  Goal: Plan of Care Review  Outcome: Met  Goal: Patient-Specific Goal (Individualized)  Outcome: Met  Goal: Absence of Hospital-Acquired Illness or Injury  Outcome: Met  Goal: Optimal Comfort and Wellbeing  Outcome: Met  Goal: Readiness for Transition of Care  Outcome: Met     Problem: Diabetes Comorbidity  Goal: Blood Glucose Level Within Targeted Range  Outcome: Met     Problem: Fall Injury Risk  Goal: Absence of Fall and Fall-Related Injury  Outcome: Met     Problem: Impaired Wound Healing  Goal: Optimal Wound Healing  Outcome: Met     Problem: Skin Injury Risk Increased  Goal: Skin Health and Integrity  Outcome: Met     Problem: Fluid and Electrolyte Imbalance (Acute Kidney Injury/Impairment)  Goal: Fluid and Electrolyte Balance  Outcome: Met     Problem: Oral Intake Inadequate (Acute Kidney Injury/Impairment)  Goal: Optimal Nutrition Intake  Outcome: Met     Problem: Renal Function Impairment (Acute Kidney Injury/Impairment)  Goal: Effective Renal Function  Outcome: Met     Problem: Infection  Goal: Absence of Infection Signs and Symptoms  Outcome: Met     Problem: Coping Ineffective  Goal: Effective Coping  Outcome: Met

## 2022-04-05 NOTE — SUBJECTIVE & OBJECTIVE
Interval History: patient just arrived to ICU on epi infusion and code blue called immediately while transferring from OR to room  Past Medical History:   Diagnosis Date    Arthritis     Cellulitis     CKD (chronic kidney disease), stage III     Coronary artery disease     Diabetes mellitus     Diabetic retinopathy     Diabetic ulcer of left foot     Glaucoma     Gout     Hyperlipemia     Hypertension     ICD (implantable cardioverter-defibrillator) in place 11/02/2018    Left chest    Non-pressure chronic ulcer of other part of left foot with fat layer exposed 10/23/2018    PVD (peripheral vascular disease)     Type 2 diabetes mellitus with left diabetic foot ulcer 10/29/2018    Unsteady gait     uses a wheelchair       Past Surgical History:   Procedure Laterality Date    AHMED GLAUCOMA IMPLANT Left 2011    DONE AT [facility]    AORTOGRAPHY WITH SERIALOGRAPHY Left 4/30/2019    Procedure: AORTOGRAM, WITH SERIALOGRAPHY, LEFT LOWER EXTREMITY, POSSIBLE INTERVENTION;  Surgeon: Mitch Chan MD;  Location: [facility];  Service: Vascular;  Laterality: Left;  RN PRE OP 4-23-19.  NO H & P, No Cosent  [place]    AORTOGRAPHY WITH SERIALOGRAPHY Right 10/6/2020    Procedure: AORTOGRAM, WITH SERIALOGRAPHY, RIGHT LOWER EXTREMITY ANGIOGRAM, POSSIBLE INTERVENTION;  Surgeon: Mitch Chan MD;  Location: [facility] OR;  Service: Vascular;  Laterality: Right;  RN PREOP 10/1/2020--COVID NEGATIVE ON 10/5    AORTOGRAPHY WITH SERIALOGRAPHY Right 1/13/2021    Procedure: AORTOGRAM, WITH RIGHT LOWER EXTREMITY ANGIOGRAM, POSSIBLE INTERVENTION;  Surgeon: Mitch Chan MD;  Location: [facility] OR;  Service: Vascular;  Laterality: Right;  1PM START----NEED CONSENT  RN PREOP 1/8/2021--COVID NEGATIVE ON 1/12    AORTOGRAPHY WITH SERIALOGRAPHY Right 1/26/2021    Procedure: AORTOGRAM, RIGHT LOWER EXTREMITY ANGIOGRAM, POSSIBLE INTERVENTION;  Surgeon: Mitch Chan MD;  Location: [facility] OR;  Service: Vascular;  Laterality: Right;  RN PREOP  1/25/2021, COVID NEGATIVE ON 1/25, cxr, and ecg done    AORTOGRAPHY WITH SERIALOGRAPHY Right 3/30/2021    Procedure: AORTOGRAM, WITH RIGHT LOWER EXTREMITY ANGIOGRAM, POSSIBLE INTERVENTION;  Surgeon: Mitch Chan MD;  Location: [facility] OR;  Service: Vascular;  Laterality: Right;  RN PREOP 3/26/2021  COVID NEGATIVE    BAERVELDT GLAUCOMA IMPLANT Left 2012    WITH CATARACT EXTRACTION//DONE AT [facility]    BELOW KNEE AMPUTATION OF LOWER EXTREMITY Right 5/17/2021    Procedure: AMPUTATION, BELOW KNEE;  Surgeon: Christiana Pritchett MD;  Location: [facility] OR;  Service: Orthopedics;  Laterality: Right;  WOUND VAC -PREVENA  SUPINE---HAS--ICD- Iast interrogation -3/21  AMPUTATION TRAY  1ST CASE FLORA WHITLOCK  RN PREOP 5/13/2021     COVID --NEGATIVE ON 5/14---INCOMPLETE H/P    BIOPSY Right 12/18/2020    Procedure: Biopsy- trocar biopsy;  Surgeon: Brianna Champion DPM;  Location: [facility] OR;  Service: Podiatry;  Laterality: Right;    CARDIAC CATHETERIZATION Left 05/2016    CARDIAC DEFIBRILLATOR PLACEMENT Left 11/02/2018    CATARACT EXTRACTION W/  INTRAOCULAR LENS IMPLANT Left 2012    WITH BAERVEDT (DONE AT [facility])    CATARACT EXTRACTION W/  INTRAOCULAR LENS IMPLANT Right 09/26/2018    COMPLEX ()    CB DESTRUCTION WITH CYCLO G6 Left 02/15/2017        CYST REMOVAL      DEBRIDEMENT OF FOOT  12/28/2018    Procedure: DEBRIDEMENT, FOOT;  Surgeon: Mitch Chan MD;  Location: [facility] OR;  Service: Vascular;;    DEBRIDEMENT OF FOOT  6/5/2019    Procedure: DEBRIDEMENT, FOOT;  Surgeon: Mitch Chan MD;  Location: [facility] OR;  Service: Vascular;;    DEBRIDEMENT OF FOOT Right 3/5/2021    Procedure: DEBRIDEMENT, FOOT with Misonic device;  Surgeon: Mitch Chan MD;  Location: [facility] OR;  Service: Vascular;  Laterality: Right;    EXAMINATION UNDER ANESTHESIA Left 12/5/2018    Procedure: Exam under anesthesia, left foot debridement, washout and all other indicated procedures;  Surgeon: Mitch Chan MD;   Location: [facility] OR;  Service: Vascular;  Laterality: Left;  1030AM START  RN PREOP 12/3/2018-----AICD  SEEN BY DR JAMES 12/4    EXAMINATION UNDER ANESTHESIA Left 2/13/2019    Procedure: LEFT FOOT WOUND DEBRIDEMENT, WASHOUT AND ALL OTHER INDICATED PROCEDURES;  Surgeon: Mitch Wall MD;  Location: [facility] OR;  Service: Vascular;  Laterality: Left;  requesting 1st case start  THIS CASE 1ST PER DR WALL ON 2/1/19 @ 844AM -LO  RN PREOP 2/6/2019  HAS CARDIAC CLEARANCE    EXAMINATION UNDER ANESTHESIA Left 12/28/2018    Procedure: Exam under anesthesia, left foot debridement, left foot washout, possible left second through fifth metatarsal resection;  Surgeon: Mitch Wall MD;  Location: [facility] OR;  Service: Vascular;  Laterality: Left;  1:00pm start per julissa @ 8:58am  RN  PHONE PRE OP 12-27-18--=Pt coming from [facility]  NEED CONSENT    EXAMINATION UNDER ANESTHESIA Left 6/5/2019    Procedure: LEFT FOOT DEBRIDEMENT, WASHOUT AND ALL OTHER INDICATED PROCEDURES;  Surgeon: Mitch Wall MD;  Location: [facility] OR;  Service: Vascular;  Laterality: Left;  RN PRE OP 5-31-19------ICD------NEED CONSENT    EXAMINATION UNDER ANESTHESIA Right 11/9/2020    Procedure: RIGHT FOOT DEBRIDEMENT, WASHOUT AND ALL OTHER INDICATED PROCEDURES;  Surgeon: Mitch Wall MD;  Location: [facility] OR;  Service: Vascular;  Laterality: Right;  RN PREOP 10/27/2020, --COVID NEGATIVE ON 11/6, has cardiac clearance/Gary 10/27/2020, pt has ICD  NEEDS ORDERS    EXAMINATION UNDER ANESTHESIA Right 2/4/2021    Procedure: RIGHT FOOT DEBRIDEMENT, POSSIBLE CALCANECTOMY, POSSIBLE FIFTH TOE AMPUTATION, WASHOUT AND ALL OTHER INDICATED PROCEDURES;  Surgeon: Mitch Wall MD;  Location: [facility] OR;  Service: Vascular;  Laterality: Right;  RN PREOP 2/2/2021--COVID NEGATIVE ON 2/2  ICD    EYE SURGERY      CAT EXT OU    FOOT AMPUTATION THROUGH METATARSAL Right 3/5/2021    Procedure: Right foot wound debridement, possible transmetatarsal  amputation, possible fifth toe amputation, washout;  Surgeon: Mitch Chan MD;  Location: [facility] OR;  Service: Vascular;  Laterality: Right;  MISONIX SONIC ONE JAYDEN LOUISE [phone] SPOKE TO HIM ON MY OFFICE @ 7:31AM ON 2-  RN PRE Op, Covid NEGATIVE ON  3-2-21.  C A  8:30AM START----NEED H/P    INCISION AND DRAINAGE Left 11/14/2018    Procedure: Incision and Drainage, left lower extremity debridement, washout;  Surgeon: Mitch Chan MD;  Location: [facility] OR;  Service: Vascular;  Laterality: Left;    INCISION AND DRAINAGE Left 11/16/2018    Procedure: INCISION AND DRAINAGE;  Surgeon: Mitch Chan MD;  Location: [facility] OR;  Service: Vascular;  Laterality: Left;    INCISION AND DRAINAGE Right 11/18/2020    Procedure: Debridement and washout right lower extremity wounds;  Surgeon: Mitch Chan MD;  Location: [facility] OR 2ND FLR;  Service: Vascular;  Laterality: Right;    INCISION AND DRAINAGE Right 1/27/2021    Procedure: RIGHT FOOT DEBRIDEMENT, WASHOUT AND ALL OTHER INDICATED PROCEDURES;  Surgeon: Mitch Chan MD;  Location: [facility] OR 2ND FLR;  Service: Vascular;  Laterality: Right;    INTRAOCULAR PROSTHESES INSERTION Right 9/26/2018    Procedure: INSERTION, IOL PROSTHESIS;  Surgeon: Perla Cortés MD;  Location: [facility] OR 1ST FLR;  Service: Ophthalmology;  Laterality: Right;    PERITONEOCENTESIS N/A 3/1/2019    Procedure: PARACENTESIS, ABDOMINAL, INITIAL;  Surgeon: Dosc Diagnostic Provider;  Location: [facility] OR;  Service: Radiology;  Laterality: N/A;    PERITONEOCENTESIS N/A 3/25/2019    Procedure: PARACENTESIS, ABDOMINAL, INITIAL;  Surgeon: Dosc Diagnostic Provider;  Location: [facility] OR;  Service: Radiology;  Laterality: N/A;    PERITONEOCENTESIS N/A 7/22/2019    Procedure: PARACENTESIS, ABDOMINAL, INITIAL;  Surgeon: Dosc Diagnostic Provider;  Location: [facility] OR;  Service: Radiology;  Laterality: N/A;    PERITONEOCENTESIS N/A 8/16/2019    Procedure: PARACENTESIS, ABDOMINAL,  INITIAL;  Surgeon: Dosc Diagnostic Provider;  Location: [facility] OR;  Service: Radiology;  Laterality: N/A;    PERITONEOCENTESIS N/A 9/26/2019    Procedure: PARACENTESIS, ABDOMINAL;  Surgeon: Dosc Diagnostic Provider;  Location: [facility] OR;  Service: Radiology;  Laterality: N/A;    PERITONEOCENTESIS N/A 10/11/2019    Procedure: PARACENTESIS, ABDOMINAL;  Surgeon: Dosc Diagnostic Provider;  Location: [facility] OR;  Service: Radiology;  Laterality: N/A;    PERITONEOCENTESIS N/A 10/31/2019    Procedure: PARACENTESIS, ABDOMINAL;  Surgeon: Dosc Diagnostic Provider;  [facility]: [facility];  Service: Radiology;  Laterality: N/A;    PERITONEOCENTESIS N/A 11/18/2019    Procedure: PARACENTESIS, ABDOMINAL;  Surgeon: Dosc Diagnostic Provider;  Location: [facility] OR;  Service: Radiology;  Laterality: N/A;    PERITONEOCENTESIS N/A 12/16/2019    Procedure: PARACENTESIS, ABDOMINAL;  Surgeon: Dosc Diagnostic Provider;  Location: [facility] OR;  Service: Radiology;  Laterality: N/A;  2PM START    PERITONEOCENTESIS N/A 2/7/2020    Procedure: PARACENTESIS, ABDOMINAL;  Surgeon: Dosc Diagnostic Provider;  Location: [facility] OR;  Service: Radiology;  Laterality: N/A;  REQUESTED 10:30A START    PERITONEOCENTESIS N/A 2/19/2020    Procedure: PARACENTESIS, ABDOMINAL;  Surgeon: Dosc Diagnostic Provider;  Location: [facility] OR;  Service: Radiology;  Laterality: N/A;    PERITONEOCENTESIS N/A 2/28/2020    Procedure: PARACENTESIS, ABDOMINAL;  Surgeon: Dosc Diagnostic Provider;  Location: [facility] OR;  Service: Radiology;  Laterality: N/A;  REQUESTED 10AM START    PHACOEMULSIFICATION OF CATARACT Right 9/26/2018    Procedure: PHACOEMULSIFICATION, CATARACT;  Surgeon: Perla Cortés MD;  Location: [facility] OR [address] [place] [address];  Service: Ophthalmology;  Laterality: Right;    REPEAT ABDOMINAL PARACENTESIS N/A 2/11/2019    Procedure: PARACENTESIS, ABDOMINAL, REPEAT;  Surgeon: Dosc Diagnostic Provider;  Location: [facility] OR;  Service: Radiology;  Laterality: N/A;  1PM START    REPEAT ABDOMINAL  PARACENTESIS N/A 9/12/2019    Procedure: PARACENTESIS, ABDOMINAL, REPEAT;  Surgeon: Dosc Diagnostic Provider;  Location: [facility] OR;  Service: Radiology;  Laterality: N/A;  9AM START    REPEAT ABDOMINAL PARACENTESIS N/A 12/2/2019    Procedure: PARACENTESIS, ABDOMINAL, REPEAT;  Surgeon: Dosc Diagnostic Provider;  [facility]: [facility];  Service: Radiology;  Laterality: N/A;  3PM START    REPEAT ABDOMINAL PARACENTESIS N/A 1/10/2020    Procedure: PARACENTESIS, ABDOMINAL, REPEAT;  Surgeon: Dosc Diagnostic Provider;  Location: [facility];  Service: Radiology;  Laterality: N/A;  1130AM START    REPEAT ABDOMINAL PARACENTESIS N/A 1/20/2020    Procedure: PARACENTESIS, ABDOMINAL, REPEAT;  Surgeon: Dosc Diagnostic Provider;  Location: [facility];  Service: Radiology;  Laterality: N/A;  1PM START    REPEAT ABDOMINAL PARACENTESIS N/A 1/31/2020    Procedure: PARACENTESIS, ABDOMINAL, REPEAT;  Surgeon: Dosc Diagnostic Provider;  Location: [facility] OR;  Service: Radiology;  Laterality: N/A;  10AM START    REPEAT ABDOMINAL PARACENTESIS N/A 3/16/2020    Procedure: PARACENTESIS, ABDOMINAL, REPEAT;  Surgeon: Dosc Diagnostic Provider;  [facility];  Service: Radiology;  Laterality: N/A;  10AM START    REPEAT ABDOMINAL PARACENTESIS N/A 3/30/2020    Procedure: PARACENTESIS, ABDOMINAL, REPEAT;  Surgeon: Dosc Diagnostic Provider;  [facility]: [facility];  Service: Radiology;  Laterality: N/A;    REPEAT ABDOMINAL PARACENTESIS N/A 4/9/2020    Procedure: PARACENTESIS, ABDOMINAL, REPEAT;  Surgeon: Dosc Diagnostic Provider;  [facility]: [facility];  Service: Radiology;  Laterality: N/A;  1130AM START    REPEAT ABDOMINAL PARACENTESIS N/A 5/8/2020    Procedure: PARACENTESIS, ABDOMINAL, REPEAT;  Surgeon: Dosc Diagnostic Provider;  Location: [facility];  Service: Radiology;  Laterality: N/A;  9AM START    REPEAT ABDOMINAL PARACENTESIS N/A 5/21/2020    Procedure: PARACENTESIS, ABDOMINAL, REPEAT;  Surgeon: Dosc Diagnostic Provider;  Location: [facility];  Service: Radiology;   Laterality: N/A;  10AM START    REPEAT ABDOMINAL PARACENTESIS N/A 6/5/2020    Procedure: PARACENTESIS, ABDOMINAL, REPEAT;  Surgeon: Dos Diagnostic Provider;  Location: [facility] OR;  Service: Radiology;  Laterality: N/A;  NOON START    REPEAT ABDOMINAL PARACENTESIS N/A 7/10/2020    Procedure: PARACENTESIS, ABDOMINAL, REPEAT;  Surgeon: Dos Diagnostic Provider;  Location: [facility] OR;  Service: Radiology;  Laterality: N/A;  1PM START    REPEAT ABDOMINAL PARACENTESIS N/A 8/20/2020    Procedure: PARACENTESIS, ABDOMINAL, REPEAT;  Surgeon: Dos Diagnostic Provider;  Location: [facility] OR;  Service: Radiology;  Laterality: N/A;  1PM START    REPEAT ABDOMINAL PARACENTESIS N/A 9/4/2020    Procedure: PARACENTESIS, ABDOMINAL, REPEAT;  Surgeon: [facility] Diagnostic Provider;  Location: [facility] OR;  Service: Radiology;  Laterality: N/A;  11 AM START    REPEAT ABDOMINAL PARACENTESIS N/A 9/16/2020    Procedure: PARACENTESIS, ABDOMINAL, REPEAT;  Surgeon: [facility] Diagnostic Provider;  Location: [facility] OR;  Service: Radiology;  Laterality: N/A;  START 2:00 P.M.    REPEAT ABDOMINAL PARACENTESIS N/A 9/28/2020    Procedure: PARACENTESIS, ABDOMINAL, REPEAT;  Surgeon: [facility] Diagnostic Provider;  Location: [facility] OR;  Service: Radiology;  Laterality: N/A;  1 P.M. START    REPEAT ABDOMINAL PARACENTESIS N/A 11/3/2020    Procedure: PARACENTESIS, ABDOMINAL, REPEAT;  Surgeon: Dos Diagnostic Provider;  Location: [facility] OR;  Service: Radiology;  Laterality: N/A;  11AM START    REPEAT ABDOMINAL PARACENTESIS N/A 11/13/2020    Procedure: PARACENTESIS, ABDOMINAL, REPEAT;  Surgeon: Dos Diagnostic Provider;  Location: [facility] OR;  Service: Radiology;  Laterality: N/A;  12PM START    REPEAT ABDOMINAL PARACENTESIS N/A 11/23/2020    Procedure: PARACENTESIS, ABDOMINAL, REPEAT;  Surgeon: Dos Diagnostic Provider;  Location: [facility] OR;  Service: Radiology;  Laterality: N/A;    REPEAT ABDOMINAL PARACENTESIS N/A 12/1/2020    Procedure: PARACENTESIS, ABDOMINAL, REPEAT;  Sur[facility]  Diagnostic Provider;  Location: [facility] OR;  Service: Radiology;  Laterality: N/A;  11AM START    REPEAT ABDOMINAL PARACENTESIS N/A 12/8/2020    Procedure: PARACENTESIS, ABDOMINAL, REPEAT;  Surgeon: Dosc Diagnostic Provider;  Location: [facility] OR;  Service: Radiology;  Laterality: N/A;  93AM START    REPEAT ABDOMINAL PARACENTESIS N/A 12/17/2020    Procedure: PARACENTESIS, ABDOMINAL, REPEAT;  Surgeon: Dosc Diagnostic Provider;  Location: [facility] OR;  Service: Radiology;  Laterality: N/A;  10AM START    REPEAT ABDOMINAL PARACENTESIS N/A 12/30/2020    Procedure: PARACENTESIS, ABDOMINAL, REPEAT;  Surgeon: Dosc Diagnostic Provider;  Location: [facility] OR;  Service: Radiology;  Laterality: N/A;  9 A.M. START    REPEAT ABDOMINAL PARACENTESIS N/A 1/12/2021    Procedure: PARACENTESIS, ABDOMINAL, REPEAT;  Surgeon: Dosc Diagnostic Provider;  Location: [facility] OR;  Service: Radiology;  Laterality: N/A;  10 AM START    REPEAT ABDOMINAL PARACENTESIS N/A 2/10/2021    Procedure: PARACENTESIS, ABDOMINAL, REPEAT;  Surgeon: Dosc Diagnostic Provider;  Location: [facility] OR;  Service: Radiology;  Laterality: N/A;  2 P.M. START    REPEAT ABDOMINAL PARACENTESIS N/A 2/18/2021    Procedure: PARACENTESIS, ABDOMINAL, REPEAT;  Surgeon: Dosc Diagnostic Provider;  Location: [facility] OR;  Service: Radiology;  Laterality: N/A;  230PM START    REPEAT ABDOMINAL PARACENTESIS N/A 2/26/2021    Procedure: PARACENTESIS, ABDOMINAL, REPEAT;  Surgeon: Dosc Diagnostic Provider;  Location: [facility] OR;  Service: Radiology;  Laterality: N/A;    REPEAT ABDOMINAL PARACENTESIS N/A 3/4/2021    Procedure: PARACENTESIS, ABDOMINAL, REPEAT;  Surgeon: Dosc Diagnostic Provider;  Location: [facility] OR;  Service: Radiology;  Laterality: N/A;  9AM START    REPEAT ABDOMINAL PARACENTESIS N/A 3/12/2021    Procedure: PARACENTESIS, ABDOMINAL, REPEAT;  Surgeon: Dosc Diagnostic Provider;  Location: [facility] OR;  Service: Radiology;  Laterality: N/A;  10:00 START TIME  NO PRE-OP REQUIRED    REPEAT ABDOMINAL  PARACENTESIS N/A 4/7/2021    Procedure: PARACENTESIS, ABDOMINAL, REPEAT;  Surgeon: Dosc Diagnostic Provider;  Location: [facility] OR;  Service: Radiology;  Laterality: N/A;  1 P.M. START    REPEAT ABDOMINAL PARACENTESIS N/A 4/16/2021    Procedure: PARACENTESIS, ABDOMINAL, REPEAT;  Surgeon: Dosc Diagnostic Provider;  Location: [facility] OR;  Service: Radiology;  Laterality: N/A;  9:OO START  NO PRE-OP REQ'D    REPEAT ABDOMINAL PARACENTESIS N/A 4/26/2021    Procedure: PARACENTESIS, ABDOMINAL, REPEAT;  Surgeon: Dosc Diagnostic Provider;  Location: [facility] OR;  Service: Radiology;  Laterality: N/A;  9AM START    REPEAT ABDOMINAL PARACENTESIS N/A 5/6/2021    Procedure: PARACENTESIS, ABDOMINAL, REPEAT;  Surgeon: Dos Diagnostic Provider;  Location: [facility] OR;  Service: Radiology;  Laterality: N/A;  3PM START    REPEAT ABDOMINAL PARACENTESIS N/A 5/14/2021    Procedure: PARACENTESIS, ABDOMINAL, REPEAT;  Surgeon: Dos Diagnostic Provider;  Location: [facility] OR;  Service: Radiology;  Laterality: N/A;  1:00PM START  NO PRE-OP REQ'D    REPEAT ABDOMINAL PARACENTESIS N/A 5/24/2021    Procedure: PARACENTESIS, ABDOMINAL, REPEAT;  Surgeon: Dos Diagnostic Provider;  Location: [facility] OR;  Service: Radiology;  Laterality: N/A;  2 P.M START TIME    REPEAT ABDOMINAL PARACENTESIS N/A 6/4/2021    Procedure: PARACENTESIS, ABDOMINAL, REPEAT;  Surgeon: Dosc Diagnostic Provider;  Location: [facility] OR;  Service: Radiology;  Laterality: N/A;  11AM START    REPEAT ABDOMINAL PARACENTESIS N/A 6/14/2021    Procedure: PARACENTESIS, ABDOMINAL, REPEAT;  Surgeon: Dos Diagnostic Provider;  Location: [facility] OR;  Service: Radiology;  Laterality: N/A;  8AM START    REPEAT ABDOMINAL PARACENTESIS N/A 6/23/2021    Procedure: PARACENTESIS, ABDOMINAL, REPEAT;  Surgeon: Dosc Diagnostic Provider;  Location: [facility] OR;  Service: Radiology;  Laterality: N/A;    REVISION OF PROCEDURE INVOLVING GLAUCOMA DRAINAGE DEVICE Left 10/2/2019    EXTRUDING BAERVELDT /WITH PERICARDIAL PATCH GRAFT  ()    Right foot surgery  10/2014    TOE AMPUTATION Right     first and second    TOE AMPUTATION Left 2018    Procedure: AMPUTATION, TOES  2-5;  Surgeon: Mitch Chan MD;  Location: [facility] OR;  Service: Vascular;  Laterality: Left;    TOE AMPUTATION Left 2018    Procedure: AMPUTATION, TOE;  Surgeon: Mitch Chan MD;  Location: [facility] OR;  Service: Vascular;  Laterality: Left;  left great toe    TONSILLECTOMY         Review of patient's allergies indicates:   Allergen Reactions    Statins-hmg-coa reductase inhibitors      Generalized Pain    Onglyza [saxagliptin]     Penicillins Rash       Medications:  Continuous Infusions:   DOBUTamine IV infusion (titrating)      EPINEPHrine 0.4 mcg/kg/min (22 [id number])     Scheduled Meds:   allopurinoL  300 mg Oral Daily    aspirin  81 mg Oral Daily    brimonidine 0.2%  1 drop Both Eyes TID    carvediloL  3.125 mg Oral BID WM    clopidogreL  75 mg Oral Daily    collagenase   Topical (Top) Daily    dorzolamide-timolol 2-0.5%  1 drop Both Eyes TID    enoxaparin  30 mg Subcutaneous Daily    ezetimibe  10 mg Oral Daily    gabapentin  200 mg Oral QHS    insulin detemir U-100  10 Units Subcutaneous QHS    mupirocin   Nasal BID     PRN Meds:sodium chloride, ceFAZolin 2 g/50mL Dextrose IVPB, dextrose 10%, dextrose 10%, EPINEPHrine, glucagon (human recombinant), glucose, glucose, insulin aspart U-100, oxyCODONE-acetaminophen, sodium bicarbonate, sodium chloride 0.9%    Family History       Problem Relation (Age of Onset)    Diabetes Mother    Heart disease Brother    No Known Problems Father, Sister, Maternal Aunt, Maternal Uncle, Paternal Aunt, Paternal Uncle, Maternal Grandmother, Maternal Grandfather, Paternal Grandmother, Paternal Grandfather          Tobacco Use    Smoking status: Former Smoker     Packs/day: 1.00     Years: 3.00     Pack years: 3.00     Types: Cigarettes     Quit date: 1984     Years since quittin.7    Smokeless  tobacco: Never Used   Substance and Sexual Activity    Alcohol use: Not Currently     Alcohol/week: 1.0 standard drink     Types: 1 Cans of beer per week     Comment: rarely    Drug use: No    Sexual activity: Not Currently     Partners: Female       Review of Systems  Objective:     Vital Signs (Most Recent):  Temp: 97.7 °F (36.5 °C) (04/05/22 1354)  Pulse: 90 (04/05/22 1354)  Resp: 15 (pt on vent) (04/05/22 1354)  BP: (S) (!) 108/58 (04/05/22 1354)  SpO2: (!) 88 % (04/05/22 1354)   Vital Signs (24h Range):  Temp:  [97.7 °F (36.5 °C)-98.7 °F (37.1 °C)] 97.7 °F (36.5 °C)  Pulse:  [65-90] 90  Resp:  [15-20] 15  SpO2:  [88 %-98 %] 88 %  BP: ()/(48-61) 108/58     Weight: 84.4 kg (186 lb)  Body mass index is 26.69 kg/m².    Physical Exam    Palliative Exam    Advance Care Planning   Advance Care Planning       Significant Labs: {Results:45869}  CBC:   Recent Labs   Lab 04/05/22  0301   WBC 8.45   HGB 12.7*   HCT 39.6*   *        BMP:  Recent Labs   Lab 04/05/22  0011 04/05/22  0301   * 223*   * 132*   K 3.4* 3.5   CL 94* 93*   CO2 28 28   BUN 79* 79*   CREATININE 2.2* 2.4*   CALCIUM 7.7* 7.8*   MG 2.4  --      LFT:  Lab Results   Component Value Date    AST 22 04/04/2022    ALKPHOS 87 04/04/2022    BILITOT 0.4 04/04/2022     Albumin:   Albumin   Date Value Ref Range Status   04/04/2022 2.4 (L) 3.5 - 5.2 g/dL Final     Protein:   Total Protein   Date Value Ref Range Status   04/04/2022 5.5 (L) 6.0 - 8.4 g/dL Final     Lactic acid:   Lab Results   Component Value Date    LACTATE 2.5 (H) 12/11/2018    LACTATE 1.3 11/08/2018       Significant Imaging: {Imaging Review:[id number]}

## 2022-04-05 NOTE — HOSPITAL COURSE
"61 yo M with past medical hx of HTN, DM, gout, CAD, right BKA, CKD, HLD, and combined CHF admitted on 04/04/2022 for comminuted and displaced intertrochanteric fracture of the left femur, acute on chronic heart failure, LAYNE, and hypokalemia.     Patient s/p mechanical fall. XR of left femur and hip showed comminuted and displaced intertrochanteric fracture of the left femur. CXR with Interstitial pulmonary edema and small right pleural effusion. BNP elevated >1300. Patient restarted on home diuretics; held on 04/05 given hypotension and worsening Creatine. Echo with EF of 15%,G3DD, and pHTN. Cardiology consulted, patient is high risk for surgery. Patient is aware of his risks prior to surgery. Dr. Lazcano discussed code status on 04/04/22, patient agreed to remained full code. Orthopedic consulted; plan for left femur fracture surgery on 04/05. Patient coded twice intraoperatively.    CODE blue called overhead. Was contacted and arrived to patient's bedside.  ICU team running the code. Of note, patient coded twice in the OR but was able to achieve ROSC. Patient arrived to ICU on epi drip. Patient rhythm went into VTach and code blue was called and compressions started.    Discussed with patient's wife in the Chapel with Palliative care team (Delmis Hobbs).  Informed the patient's sister that patient is coding again, likely due to cardiac arrest given CHF with worsening EF of 15%.  Discussed that his CHF likely worsened intraoperatively. Sister confirmed that patient made it very clear that if this was to happen again and that if patient is unable to return back to his normal self or required life support, that the patient would not want this.  She states that the patient said that "I do not want to be a vegetable, no matter what." After careful consideration, the patient's sister agree to stopped attempts at resuscitation and make him DNR. States that the patient would not want to continue. States "it's God's " "Will".      Discussed with rapid/code team of the patient's sister wishes and expressed that the patient would not want this continued. Time of death was called at 14:12    No spontaneous movements were present.  There was no response to verbal or tactile stimuli.  No breath sounds were appreciated over either lung field.  No carotid pulses were palpable.  No radial pulses were palpable.  No heart sounds were auscultated over the entire precordium.      Patient pronounced dead at 14:12 on 04/05/2022.  Family at bedside.  Emotional support provided. Likely cause of death cardiac arrest due to worsening heart failure intraoperatively.   "

## 2022-04-05 NOTE — CARE UPDATE
The fact that the patient is high risk for surgery was discussed in detail with the patient and his relatives. They verbalized understanding.

## 2022-04-05 NOTE — PROGRESS NOTES
[facility]  Progress Note    Patient Name: Marvin Ray  MRN: [id number]  Patient Class: IP- Inpatient   Admission Date: 4/4/2022  Length of Stay: 1 days  Attending Physician: Noe Seo DO  Primary Care Provider: Rubén Davis MD        Subjective:     Principal Problem:Fracture of femur        HPI:  Mr. Ray is a 61 yo M who presents after a mechanical fall in the bathroom with a CC of L leg pain. He was found to have L femur fracture. Patient has a known history of CAD and systolic heart failure with a ICD and EF of 20%.  His xray was read as pulmonary edema although he states he is not SOB at all and has not had a CHF exacerbation in many years. He as liver cirrhosis with ascites that gets drained every Thursday.  He is s/p R BKA and is mainly wheelchair bound but is going to out patient PT/OT 3 times a week to learn to walk with a prosthesis.       Overview/Hospital Course:  61 yo M with past medical hx of HTN, DM, gout, CAD, right BKA, CKD, HLD, and combined CHF admitted on 04/04/2022 for comminuted and displaced intertrochanteric fracture of the left femur, acute on chronic heart failure, LAYNE, and hypokalemia.     Patient s/p mechanical fall. XR of left femur and hip showed comminuted and displaced intertrochanteric fracture of the left femur. CXR with Interstitial pulmonary edema and small right pleural effusion. BNP elevated >1300. Patient restarted on home diuretics; held on 04/05 given hypotension and worsening Creatine. Echo with EF of 15%,G3DD, and pHTN. Cardiology consulted, patient is high risk for surgery. Orthopedic consulted; plan for left femur fracture surgery on 04/05. Continue pain control. Monitor Is/Os and BP closely. Consider nephrology consult if renal function continues to worsen.       Interval History: No acute overnight events. Blood pressure low-normal and MAP >65. No signs of bleeding. Continues to required supplemental O2. Denies feeling  short of breath. Complains of left hip pain. No new numbness or tingling. Wife at bedside.     Review of Systems   Constitutional:  Positive for activity change. Negative for chills and fever.   HENT:  Negative for congestion, sinus pain and trouble swallowing.    Eyes:  Negative for photophobia and visual disturbance.   Respiratory:  Negative for cough, choking, shortness of breath and wheezing.    Cardiovascular:  Negative for chest pain, palpitations and leg swelling.   Gastrointestinal:  Positive for abdominal distention. Negative for abdominal pain, diarrhea, nausea and vomiting.   Genitourinary:  Negative for difficulty urinating, dysuria and hematuria.   Musculoskeletal:  Positive for arthralgias and gait problem (hx of right BKA).   Skin:  Negative for rash.   Neurological:  Negative for dizziness, weakness, numbness and headaches.   Psychiatric/Behavioral:  Positive for agitation. Negative for confusion and hallucinations.    Objective:     Vital Signs (Most Recent):  Temp: 98.2 °F (36.8 °C) (04/05/22 0742)  Pulse: 65 (04/05/22 1000)  Resp: 20 (04/05/22 1000)  BP: 98/61 (04/05/22 1000)  SpO2: (!) 93 % (04/05/22 1000)   Vital Signs (24h Range):  Temp:  [98.1 °F (36.7 °C)-98.7 °F (37.1 °C)] 98.2 °F (36.8 °C)  Pulse:  [65-84] 65  Resp:  [16-20] 20  SpO2:  [92 %-98 %] 93 %  BP: ()/(48-61) 98/61     Weight: 84.4 kg (186 lb)  Body mass index is 26.69 kg/m².    Intake/Output Summary (Last 24 hours) at 4/5/2022 1043  Last data filed at 4/5/2022 0600  Gross per 24 hour   Intake --   Output 450 ml   Net -450 ml      Physical Exam  Constitutional:       General: He is not in acute distress.     Appearance: He is obese. He is not ill-appearing, toxic-appearing or diaphoretic.   HENT:      Head: Atraumatic.      Right Ear: External ear normal.      Left Ear: External ear normal.      Nose: Nose normal.      Mouth/Throat:      Mouth: Mucous membranes are dry.   Eyes:      Extraocular Movements: Extraocular  movements intact.      Conjunctiva/sclera: Conjunctivae normal.   Cardiovascular:      Rate and Rhythm: Normal rate and regular rhythm.      Heart sounds: No murmur heard.    No gallop.   Pulmonary:      Effort: Pulmonary effort is normal. No respiratory distress.      Breath sounds: Normal breath sounds. No wheezing.   Abdominal:      General: There is distension.      Palpations: Abdomen is soft.      Tenderness: There is no abdominal tenderness. There is no guarding.   Musculoskeletal:         General: Deformity (right BKA) present. No swelling or tenderness.      Left lower leg: No edema.      Comments: Left LLE/Hip: Tender to palpation on on left lateral hip. Limited ROM due to pain. Able to flex and extend his ankle and wiggle all toes. DP pulse appreciated.   Right elbow with swelling (gout), nontender on exam. No erythema   Skin:     General: Skin is warm and dry.   Neurological:      General: No focal deficit present.      Mental Status: He is alert and oriented to person, place, and time.   Psychiatric:      Comments: Patient appears anxious and frustrated       Significant Labs: All pertinent labs within the past 24 hours have been reviewed.    Significant Imaging: I have reviewed all pertinent imaging results/findings within the past 24 hours.      Assessment/Plan:      * Fracture of femur  After mechanical fall at home with L femur fx.   XR left hip/femur: Comminuted and displaced intertrochanteric fracture of the left femur  Given CAD and CHF, cards was consulted- pt high risk for surgery   Echo with EF of 15%, G3DD, pHTN  Ortho consulted- plan for surgery 04/05      Acute systolic CHF (congestive heart failure)  BNP  Recent Labs   Lab 04/05/22  0011   BNP 1,367*     CXR: Interstitial pulmonary edema and small right pleural effusion  EKG personally reviewed and shows NSR, prolonged QTc  Started CHF pathway  On home metolazone 2.5mg and toresemide 20mg- will hold at this time given low BP  Monitor ins  and outs  TTE as above  Continue Bb  Nutrition consulted for low salt cardiac diet education  Appears Euvolemic on exam  Cardiology consulted      Acute-on-chronic kidney injury  Presented with LAYNE  Cr on admit 2.3  Baseline Cr 1.3-1.8  Suspect due to CHF (passive congestion) vs over-diuresis.   Monitor Is/Os  Hold home diuretics at this time  Avoid nephrotoxins, renal dose all meds.   Monitor       Hypokalemia  K on admit 2.9  Replace as needed  Resolved      CKD stage 3 due to type 2 diabetes mellitus  Cr worsening, 2.4 today  Baseline Cr 1.3-1.8  Plan as above for LAYNE        Essential hypertension  Blood pressure low-normal  Maintain MAP >65  Hold home diuretics at this time    DM type 2 with diabetic peripheral neuropathy  A1c:   Lab Results   Component Value Date    HGBA1C 8.7 (H) 01/07/2022     Meds: SSI PRN to maintain goal 140-180  ADA diet, accuchecks ACHS, hypoglycemic protocol  Uncontrolled with hyperglycemia.   Manifestations of neuropathy and CKD   Consider basal insulin post-operatively     Coronary artery disease involving native coronary artery of native heart without angina pectoris  Continue home Plavix    Cards consulted       PVD (peripheral vascular disease)  On plavix     Followed by vascular      Tophaceous gout  Continue home allopurinol      Ischemic cardiomyopathy  No CP or SOB   Currently euvolemic on exam.   Echo as above.  Status post ICD  Cards consulted      Cirrhosis of liver with ascites  Unknown cause-- they say it is from CHF (?)     He gets paracentesis every Thursday.       Hepatosplenomegaly  Patient has cirrhosis       Debility  Wheel chair bound.    Learning to walk out patient       Aortic atherosclerosis  No acute issues   Continue home ezetimibe     Anemia of chronic disease  No acute issues   Hgb stable      ICD (implantable cardioverter-defibrillator), single, in situ  Noted         VTE Risk Mitigation (From admission, onward)         Ordered     enoxaparin injection 30  mg  Daily         04/04/22 1332                Discharge Planning   MISSY:      Code Status: Full Code   Is the patient medically ready for discharge?:     Reason for patient still in hospital (select all that apply): Treatment and Consult recommendations                     Noe Seo DO  Department of Hospital Medicine   [facility]

## 2022-04-05 NOTE — PLAN OF CARE
Pt resting quietly over night.  Call bell within reach.  No distress noted.  Bed locked and in lowest position.                                                                                                                                                                                                                                                                                                                                                                                                                                                                                                                                                                                                                                                                                                                                                                              Problm: Adult Inpatient Plan of Care  Goal: Plan of Care Review  Outcome: Ongoing, Not Progressing  Goal: Patient-Specific Goal (Individualized)  Outcome: Ongoing, Not Progressing  Goal: Absence of Hospital-Acquired Illness or Injury  Outcome: Ongoing, Not Progressing  Goal: Optimal Comfort and Wellbeing  Outcome: Ongoing, Not Progressing  Goal: Readiness for Transition of Care  Outcome: Ongoing, Not Progressing     Problem: Diabetes Comorbidity  Goal: Blood Glucose Level Within Targeted Range  Outcome: Ongoing, Not Progressing     Problem: Fall Injury Risk  Goal: Absence of Fall and Fall-Related Injury  Outcome: Ongoing, Not Progressing     Problem: Impaired Wound Healing  Goal: Optimal Wound Healing  Outcome: Ongoing, Not Progressing     Problem: Skin Injury Risk Increased  Goal: Skin Health and Integrity  Outcome: Ongoing, Not Progressing

## 2022-04-05 NOTE — SUBJECTIVE & OBJECTIVE
Past Medical History:   Diagnosis Date    Arthritis     Cellulitis     CKD (chronic kidney disease), stage III     Coronary artery disease     Diabetes mellitus     Diabetic retinopathy     Diabetic ulcer of left foot     Glaucoma     Gout     Hyperlipemia     Hypertension     ICD (implantable cardioverter-defibrillator) in place 11/02/2018    Left chest    Non-pressure chronic ulcer of other part of left foot with fat layer exposed 10/23/2018    PVD (peripheral vascular disease)     Type 2 diabetes mellitus with left diabetic foot ulcer 10/29/2018    Unsteady gait     uses a wheelchair       Past Surgical History:   Procedure Laterality Date    AHMED GLAUCOMA IMPLANT Left 2011    DONE AT [facility]    AORTOGRAPHY WITH SERIALOGRAPHY Left 4/30/2019    Procedure: AORTOGRAM, WITH SERIALOGRAPHY, LEFT LOWER EXTREMITY, POSSIBLE INTERVENTION;  Surgeon: Mitch Chan MD;  Location: [facility] OR;  Service: Vascular;  Laterality: Left;  RN PRE OP 4-23-19.  NO H & P, No Cosent  [place]    AORTOGRAPHY WITH SERIALOGRAPHY Right 10/6/2020    Procedure: AORTOGRAM, WITH SERIALOGRAPHY, RIGHT LOWER EXTREMITY ANGIOGRAM, POSSIBLE INTERVENTION;  Surgeon: Mitch Chan MD;  Location: [facility] OR;  Service: Vascular;  Laterality: Right;  RN PREOP 10/1/2020--COVID NEGATIVE ON 10/5    AORTOGRAPHY WITH SERIALOGRAPHY Right 1/13/2021    Procedure: AORTOGRAM, WITH RIGHT LOWER EXTREMITY ANGIOGRAM, POSSIBLE INTERVENTION;  Surgeon: Mitch Chan MD;  Location: [facility] OR;  Service: Vascular;  Laterality: Right;  1PM START----NEED CONSENT  RN PREOP 1/8/2021--COVID NEGATIVE ON 1/12    AORTOGRAPHY WITH SERIALOGRAPHY Right 1/26/2021    Procedure: AORTOGRAM, RIGHT LOWER EXTREMITY ANGIOGRAM, POSSIBLE INTERVENTION;  Surgeon: Mitch Chan MD;  Location: [facility] OR;  Service: Vascular;  Laterality: Right;  RN PREOP 1/25/2021, COVID NEGATIVE ON 1/25, cxr, and ecg done    AORTOGRAPHY WITH SERIALOGRAPHY Right 3/30/2021    Procedure: AORTOGRAM, WITH RIGHT  LOWER EXTREMITY ANGIOGRAM, POSSIBLE INTERVENTION;  Surgeon: Mitch Chan MD;  Location: [facility] OR;  Service: Vascular;  Laterality: Right;  RN PREOP 3/26/2021  COVID NEGATIVE    BAERVELDT GLAUCOMA IMPLANT Left 2012    WITH CATARACT EXTRACTION//DONE AT [facility]    BELOW KNEE AMPUTATION OF LOWER EXTREMITY Right 5/17/2021    Procedure: AMPUTATION, BELOW KNEE;  Surgeon: Christiana Pritchett MD;  Location: [facility] OR;  Service: Orthopedics;  Laterality: Right;  WOUND VAC -PREVENA  SUPINE---HAS--ICD- Iast interrogation -3/21  AMPUTATION TRAY  1ST CASE OKWHITNEY WHITLOCK  RN PREOP 5/13/2021     COVID --NEGATIVE ON 5/14---INCOMPLETE H/P    BIOPSY Right 12/18/2020    Procedure: Biopsy- trocar biopsy;  Surgeon: Brianna Champion DPM;  Location: [facility] OR;  Service: Podiatry;  Laterality: Right;    CARDIAC CATHETERIZATION Left 05/2016    CARDIAC DEFIBRILLATOR PLACEMENT Left 11/02/2018    CATARACT EXTRACTION W/  INTRAOCULAR LENS IMPLANT Left 2012    WITH BAERVEDT (DONE AT [facility])    CATARACT EXTRACTION W/  INTRAOCULAR LENS IMPLANT Right 09/26/2018    COMPLEX ()    CB DESTRUCTION WITH CYCLO G6 Left 02/15/2017        CYST REMOVAL      DEBRIDEMENT OF FOOT  12/28/2018    Procedure: DEBRIDEMENT, FOOT;  Surgeon: Mitch Chan MD;  Location: [facility] OR;  Service: Vascular;;    DEBRIDEMENT OF FOOT  6/5/2019    Procedure: DEBRIDEMENT, FOOT;  Surgeon: Mitch Chan MD;  Location: [facility] OR;  Service: Vascular;;    DEBRIDEMENT OF FOOT Right 3/5/2021    Procedure: DEBRIDEMENT, FOOT with Misonic device;  Surgeon: Mitch Chan MD;  Location: [facility] OR;  Service: Vascular;  Laterality: Right;    EXAMINATION UNDER ANESTHESIA Left 12/5/2018    Procedure: Exam under anesthesia, left foot debridement, washout and all other indicated procedures;  Surgeon: Mitch Chan MD;  Location: [facility] OR;  Service: Vascular;  Laterality: Left;  1030AM START  RN PREOP 12/3/2018-----AICD  SEEN BY DR JAMES 12/4     EXAMINATION UNDER ANESTHESIA Left 2/13/2019    Procedure: LEFT FOOT WOUND DEBRIDEMENT, WASHOUT AND ALL OTHER INDICATED PROCEDURES;  Surgeon: Mitch Wall MD;  Location: [facility] OR;  Service: Vascular;  Laterality: Left;  requesting 1st case start  THIS CASE 1ST PER DR WALL ON 2/1/19 @ 844AM -LO  RN PREOP 2/6/2019  HAS CARDIAC CLEARANCE    EXAMINATION UNDER ANESTHESIA Left 12/28/2018    Procedure: Exam under anesthesia, left foot debridement, left foot washout, possible left second through fifth metatarsal resection;  Surgeon: Mitch Wall MD;  Location: [facility] OR;  Service: Vascular;  Laterality: Left;  1:00pm start per julissa @ 8:58am  RN  PHONE PRE OP 12-27-18--=Pt coming from [facility] on [facility]  NEED CONSENT    EXAMINATION UNDER ANESTHESIA Left 6/5/2019    Procedure: LEFT FOOT DEBRIDEMENT, WASHOUT AND ALL OTHER INDICATED PROCEDURES;  Surgeon: Mitch Wall MD;  Location: [facility] OR;  Service: Vascular;  Laterality: Left;  RN PRE OP 5-31-19------ICD------NEED CONSENT    EXAMINATION UNDER ANESTHESIA Right 11/9/2020    Procedure: RIGHT FOOT DEBRIDEMENT, WASHOUT AND ALL OTHER INDICATED PROCEDURES;  Surgeon: Mitch Wall MD;  Location: [facility] OR;  Service: Vascular;  Laterality: Right;  RN PREOP 10/27/2020, --COVID NEGATIVE ON 11/6, has cardiac clearance/[place] 10/27/2020, pt has ICD  NEEDS ORDERS    EXAMINATION UNDER ANESTHESIA Right 2/4/2021    Procedure: RIGHT FOOT DEBRIDEMENT, POSSIBLE CALCANECTOMY, POSSIBLE FIFTH TOE AMPUTATION, WASHOUT AND ALL OTHER INDICATED PROCEDURES;  Surgeon: Mitch Wall MD;  Location: [facility] OR;  Service: Vascular;  Laterality: Right;  RN PREOP 2/2/2021--COVID NEGATIVE ON 2/2  ICD    EYE SURGERY      CAT EXT OU    FOOT AMPUTATION THROUGH METATARSAL Right 3/5/2021    Procedure: Right foot wound debridement, possible transmetatarsal amputation, possible fifth toe amputation, washout;  Surgeon: Mitch Wall MD;  Location: [facility] OR;  Service: Vascular;   Laterality: Right;  MISONIX SONIC ONE JAYDEN LOUISE [phone] SPOKE TO HIM ON MY OFFICE @ 7:31AM ON 2-  RN PRE Op, Covid NEGATIVE ON  3-2-21.  C A  8:30AM START----NEED H/P    INCISION AND DRAINAGE Left 11/14/2018    Procedure: Incision and Drainage, left lower extremity debridement, washout;  Surgeon: Mitch Chan MD;  Location: [facility] OR;  Service: Vascular;  Laterality: Left;    INCISION AND DRAINAGE Left 11/16/2018    Procedure: INCISION AND DRAINAGE;  Surgeon: Mitch Chan MD;  Location: [facility] OR;  Service: Vascular;  Laterality: Left;    INCISION AND DRAINAGE Right 11/18/2020    Procedure: Debridement and washout right lower extremity wounds;  Surgeon: Mitch Chan MD;  Location: [facility] OR 2ND FLR;  Service: Vascular;  Laterality: Right;    INCISION AND DRAINAGE Right 1/27/2021    Procedure: RIGHT FOOT DEBRIDEMENT, WASHOUT AND ALL OTHER INDICATED PROCEDURES;  Surgeon: Mitch Chan MD;  Location: [facility] OR 2ND FLR;  Service: Vascular;  Laterality: Right;    INTRAOCULAR PROSTHESES INSERTION Right 9/26/2018    Procedure: INSERTION, IOL PROSTHESIS;  Surgeon: Perla Cortés MD;  Location: [facility] OR 1ST FLR;  Service: Ophthalmology;  Laterality: Right;    PERITONEOCENTESIS N/A 3/1/2019    Procedure: PARACENTESIS, ABDOMINAL, INITIAL;  Surgeon: Dosc Diagnostic Provider;  Location: [facility] OR;  Service: Radiology;  Laterality: N/A;    PERITONEOCENTESIS N/A 3/25/2019    Procedure: PARACENTESIS, ABDOMINAL, INITIAL;  Surgeon: Dosc Diagnostic Provider;  Location: [facility] OR;  Service: Radiology;  Laterality: N/A;    PERITONEOCENTESIS N/A 7/22/2019    Procedure: PARACENTESIS, ABDOMINAL, INITIAL;  Surgeon: Dosc Diagnostic Provider;  Location: [facility] OR;  Service: Radiology;  Laterality: N/A;    PERITONEOCENTESIS N/A 8/16/2019    Procedure: PARACENTESIS, ABDOMINAL, INITIAL;  Surgeon: Dosc Diagnostic Provider;  Location: [facility] OR;  Service: Radiology;  Laterality: N/A;    PERITONEOCENTESIS N/A  9/26/2019    Procedure: PARACENTESIS, ABDOMINAL;  Surgeon: Dosc Diagnostic Provider;  Location: [facility] OR;  Service: Radiology;  Laterality: N/A;    PERITONEOCENTESIS N/A 10/11/2019    Procedure: PARACENTESIS, ABDOMINAL;  Surgeon: Dosc Diagnostic Provider;  Location: [facility] OR;  Service: Radiology;  Laterality: N/A;    PERITONEOCENTESIS N/A 10/31/2019    Procedure: PARACENTESIS, ABDOMINAL;  Surgeon: Dosc Diagnostic Provider;  Location: [facility] OR;  Service: Radiology;  Laterality: N/A;    PERITONEOCENTESIS N/A 11/18/2019    Procedure: PARACENTESIS, ABDOMINAL;  Surgeon: Dosc Diagnostic Provider;  Location: [facility] OR;  Service: Radiology;  Laterality: N/A;    PERITONEOCENTESIS N/A 12/16/2019    Procedure: PARACENTESIS, ABDOMINAL;  Surgeon: Dosc Diagnostic Provider;  Location: [facility] OR;  Service: Radiology;  Laterality: N/A;  2PM START    PERITONEOCENTESIS N/A 2/7/2020    Procedure: PARACENTESIS, ABDOMINAL;  Surgeon: Dosc Diagnostic Provider;  Location: [facility] OR;  Service: Radiology;  Laterality: N/A;  REQUESTED 10:30A START    PERITONEOCENTESIS N/A 2/19/2020    Procedure: PARACENTESIS, ABDOMINAL;  Surgeon: Dosc Diagnostic Provider;  Location: [facility] OR;  Service: Radiology;  Laterality: N/A;    PERITONEOCENTESIS N/A 2/28/2020    Procedure: PARACENTESIS, ABDOMINAL;  Surgeon: Dosc Diagnostic Provider;  Location: [facility] OR;  Service: Radiology;  Laterality: N/A;  REQUESTED 10AM START    PHACOEMULSIFICATION OF CATARACT Right 9/26/2018    Procedure: PHACOEMULSIFICATION, CATARACT;  Surgeon: Perla Cortés MD;  [facility];  Service: Ophthalmology;  Laterality: Right;    REPEAT ABDOMINAL PARACENTESIS N/A 2/11/2019    Procedure: PARACENTESIS, ABDOMINAL, REPEAT;  Surgeon: Dos Diagnostic Provider;  Location: [facility] OR;  Service: Radiology;  Laterality: N/A;  1PM START    REPEAT ABDOMINAL PARACENTESIS N/A 9/12/2019    Procedure: PARACENTESIS, ABDOMINAL, REPEAT;  Surgeon: Dosc Diagnostic Provider;  Location: [facility] OR;   Service: Radiology;  Laterality: N/A;  9AM START    REPEAT ABDOMINAL PARACENTESIS N/A 12/2/2019    Procedure: PARACENTESIS, ABDOMINAL, REPEAT;  Surgeon: Dosc Diagnostic Provider;  Location: [facility] OR;  Service: Radiology;  Laterality: N/A;  3PM START    REPEAT ABDOMINAL PARACENTESIS N/A 1/10/2020    Procedure: PARACENTESIS, ABDOMINAL, REPEAT;  Surgeon: Dosc Diagnostic Provider;  Location: [facility] OR;  Service: Radiology;  Laterality: N/A;  1130AM START    REPEAT ABDOMINAL PARACENTESIS N/A 1/20/2020    Procedure: PARACENTESIS, ABDOMINAL, REPEAT;  Surgeon: Dosc Diagnostic Provider;  Location: [facility] OR;  Service: Radiology;  Laterality: N/A;  1PM START    REPEAT ABDOMINAL PARACENTESIS N/A 1/31/2020    Procedure: PARACENTESIS, ABDOMINAL, REPEAT;  Surgeon: Dos Diagnostic Provider;  Location: [facility] OR;  Service: Radiology;  Laterality: N/A;  10AM START    REPEAT ABDOMINAL PARACENTESIS N/A 3/16/2020    Procedure: PARACENTESIS, ABDOMINAL, REPEAT;  Surgeon: Dos Diagnostic Provider;  Location: [facility] OR;  Service: Radiology;  Laterality: N/A;  10AM START    REPEAT ABDOMINAL PARACENTESIS N/A 3/30/2020    Procedure: PARACENTESIS, ABDOMINAL, REPEAT;  Surgeon: Dos Diagnostic Provider;  Location: [facility] OR;  Service: Radiology;  Laterality: N/A;    REPEAT ABDOMINAL PARACENTESIS N/A 4/9/2020    Procedure: PARACENTESIS, ABDOMINAL, REPEAT;  Surgeon: Dosc Diagnostic Provider;  Location: [facility] OR;  Service: Radiology;  Laterality: N/A;  1130AM START    REPEAT ABDOMINAL PARACENTESIS N/A 5/8/2020    Procedure: PARACENTESIS, ABDOMINAL, REPEAT;  Surgeon: Dos Diagnostic Provider;  Location: [facility] OR;  Service: Radiology;  Laterality: N/A;  9AM START    REPEAT ABDOMINAL PARACENTESIS N/A 5/21/2020    Procedure: PARACENTESIS, ABDOMINAL, REPEAT;  Surgeon: Dosc Diagnostic Provider;  Location: [facility] OR;  Service: Radiology;  Laterality: N/A;  10AM START    REPEAT ABDOMINAL PARACENTESIS N/A 6/5/2020    Procedure: PARACENTESIS, ABDOMINAL, REPEAT;  Surgeon:  Dosc Diagnostic Provider;  Location: [facility] OR;  Service: Radiology;  Laterality: N/A;  NOON START    REPEAT ABDOMINAL PARACENTESIS N/A 7/10/2020    Procedure: PARACENTESIS, ABDOMINAL, REPEAT;  Surgeon: Dosc Diagnostic Provider;  Location: [facility] OR;  Service: Radiology;  Laterality: N/A;  1PM START    REPEAT ABDOMINAL PARACENTESIS N/A 8/20/2020    Procedure: PARACENTESIS, ABDOMINAL, REPEAT;  Surgeon: Dosc Diagnostic Provider;  Location: [facility] OR;  Service: Radiology;  Laterality: N/A;  1PM START    REPEAT ABDOMINAL PARACENTESIS N/A 9/4/2020    Procedure: PARACENTESIS, ABDOMINAL, REPEAT;  Surgeon: Dosc Diagnostic Provider;  Location: [facility] OR;  Service: Radiology;  Laterality: N/A;  11 AM START    REPEAT ABDOMINAL PARACENTESIS N/A 9/16/2020    Procedure: PARACENTESIS, ABDOMINAL, REPEAT;  Surgeon: Dosc Diagnostic Provider;  Location: [facility] OR;  Service: Radiology;  Laterality: N/A;  START 2:00 P.M.    REPEAT ABDOMINAL PARACENTESIS N/A 9/28/2020    Procedure: PARACENTESIS, ABDOMINAL, REPEAT;  Surgeon: Dosc Diagnostic Provider;  Location: [facility] OR;  Service: Radiology;  Laterality: N/A;  1 P.M. START    REPEAT ABDOMINAL PARACENTESIS N/A 11/3/2020    Procedure: PARACENTESIS, ABDOMINAL, REPEAT;  Surgeon: Dosc Diagnostic Provider;  Location: [facility] OR;  Service: Radiology;  Laterality: N/A;  11AM START    REPEAT ABDOMINAL PARACENTESIS N/A 11/13/2020    Procedure: PARACENTESIS, ABDOMINAL, REPEAT;  Surgeon: Dosc Diagnostic Provider;  Location: [facility] OR;  Service: Radiology;  Laterality: N/A;  12PM START    REPEAT ABDOMINAL PARACENTESIS N/A 11/23/2020    Procedure: PARACENTESIS, ABDOMINAL, REPEAT;  Surgeon: Dosc Diagnostic Provider;  Location: [facility] OR;  Service: Radiology;  Laterality: N/A;    REPEAT ABDOMINAL PARACENTESIS N/A 12/1/2020    Procedure: PARACENTESIS, ABDOMINAL, REPEAT;  Surgeon: Dosc Diagnostic Provider;  Location: [facility] OR;  Service: Radiology;  Laterality: N/A;  11AM START    REPEAT ABDOMINAL PARACENTESIS N/A 12/8/2020     Procedure: PARACENTESIS, ABDOMINAL, REPEAT;  Surgeon: Dosc Diagnostic Provider;  Location: [facility] OR;  Service: Radiology;  Laterality: N/A;  93AM START    REPEAT ABDOMINAL PARACENTESIS N/A 12/17/2020    Procedure: PARACENTESIS, ABDOMINAL, REPEAT;  Surgeon: Dosc Diagnostic Provider;  Location: [facility] OR;  Service: Radiology;  Laterality: N/A;  10AM START    REPEAT ABDOMINAL PARACENTESIS N/A 12/30/2020    Procedure: PARACENTESIS, ABDOMINAL, REPEAT;  Surgeon: Dosc Diagnostic Provider;  Location: [facility] OR;  Service: Radiology;  Laterality: N/A;  9 A.M. START    REPEAT ABDOMINAL PARACENTESIS N/A 1/12/2021    Procedure: PARACENTESIS, ABDOMINAL, REPEAT;  Surgeon: Dosc Diagnostic Provider;  Location: [facility] OR;  Service: Radiology;  Laterality: N/A;  10 AM START    REPEAT ABDOMINAL PARACENTESIS N/A 2/10/2021    Procedure: PARACENTESIS, ABDOMINAL, REPEAT;  Surgeon: Dosc Diagnostic Provider;  Location: [facility] OR;  Service: Radiology;  Laterality: N/A;  2 P.M. START    REPEAT ABDOMINAL PARACENTESIS N/A 2/18/2021    Procedure: PARACENTESIS, ABDOMINAL, REPEAT;  Surgeon: Dosc Diagnostic Provider;  Location: [facility] OR;  Service: Radiology;  Laterality: N/A;  230PM START    REPEAT ABDOMINAL PARACENTESIS N/A 2/26/2021    Procedure: PARACENTESIS, ABDOMINAL, REPEAT;  Surgeon: Dosc Diagnostic Provider;  Location: [facility] OR;  Service: Radiology;  Laterality: N/A;    REPEAT ABDOMINAL PARACENTESIS N/A 3/4/2021    Procedure: PARACENTESIS, ABDOMINAL, REPEAT;  Surgeon: Dosc Diagnostic Provider;  Location: [facility] OR;  Service: Radiology;  Laterality: N/A;  9AM START    REPEAT ABDOMINAL PARACENTESIS N/A 3/12/2021    Procedure: PARACENTESIS, ABDOMINAL, REPEAT;  Surgeon: Dosc Diagnostic Provider;  Location: [facility] OR;  Service: Radiology;  Laterality: N/A;  10:00 START TIME  NO PRE-OP REQUIRED    REPEAT ABDOMINAL PARACENTESIS N/A 4/7/2021    Procedure: PARACENTESIS, ABDOMINAL, REPEAT;  Surgeon: Dosc Diagnostic Provider;  Location: [facility] OR;  Service:  Radiology;  Laterality: N/A;  1 P.M. START    REPEAT ABDOMINAL PARACENTESIS N/A 4/16/2021    Procedure: PARACENTESIS, ABDOMINAL, REPEAT;  Surgeon: Dos Diagnostic Provider;  Location: [facility] OR;  Service: Radiology;  Laterality: N/A;  9:OO START  NO PRE-OP REQ'D    REPEAT ABDOMINAL PARACENTESIS N/A 4/26/2021    Procedure: PARACENTESIS, ABDOMINAL, REPEAT;  Surgeon: Dosc Diagnostic Provider;  Location: [facility] OR;  Service: Radiology;  Laterality: N/A;  9AM START    REPEAT ABDOMINAL PARACENTESIS N/A 5/6/2021    Procedure: PARACENTESIS, ABDOMINAL, REPEAT;  Surgeon: Dosc Diagnostic Provider;  Location: [facility] OR;  Service: Radiology;  Laterality: N/A;  3PM START    REPEAT ABDOMINAL PARACENTESIS N/A 5/14/2021    Procedure: PARACENTESIS, ABDOMINAL, REPEAT;  Surgeon: Dos Diagnostic Provider;  Location: [facility] OR;  Service: Radiology;  Laterality: N/A;  1:00PM START  NO PRE-OP REQ'D    REPEAT ABDOMINAL PARACENTESIS N/A 5/24/2021    Procedure: PARACENTESIS, ABDOMINAL, REPEAT;  Surgeon: Dos Diagnostic Provider;  Location: [facility] OR;  Service: Radiology;  Laterality: N/A;  2 P.M START TIME    REPEAT ABDOMINAL PARACENTESIS N/A 6/4/2021    Procedure: PARACENTESIS, ABDOMINAL, REPEAT;  Surgeon: Dos Diagnostic Provider;  Location: [facility] OR;  Service: Radiology;  Laterality: N/A;  11AM START    REPEAT ABDOMINAL PARACENTESIS N/A 6/14/2021    Procedure: PARACENTESIS, ABDOMINAL, REPEAT;  Surgeon: Dosc Diagnostic Provider;  Location: [facility] OR;  Service: Radiology;  Laterality: N/A;  8AM START    REPEAT ABDOMINAL PARACENTESIS N/A 6/23/2021    Procedure: PARACENTESIS, ABDOMINAL, REPEAT;  Surgeon: Dos Diagnostic Provider;  Location: [facility] OR;  Service: Radiology;  Laterality: N/A;    REVISION OF PROCEDURE INVOLVING GLAUCOMA DRAINAGE DEVICE Left 10/2/2019    EXTRUDING BAERVELDT /WITH PERICARDIAL PATCH GRAFT ()    Right foot surgery  10/2014    TOE AMPUTATION Right     first and second    TOE AMPUTATION Left 11/16/2018    Procedure:  AMPUTATION, TOES  2-5;  Surgeon: Mitch Chan MD;  Location: [facility] OR;  Service: Vascular;  Laterality: Left;    TOE AMPUTATION Left 12/5/2018    Procedure: AMPUTATION, TOE;  Surgeon: Mitch Chan MD;  Location: [facility] OR;  Service: Vascular;  Laterality: Left;  left great toe    TONSILLECTOMY         Review of patient's allergies indicates:   Allergen Reactions    Statins-hmg-coa reductase inhibitors      Generalized Pain    Onglyza [saxagliptin]     Penicillins Rash       Current Facility-Administered Medications on File Prior to Encounter   Medication    lactated ringers infusion    lidocaine (PF) 10 mg/ml (1%) injection 10 mg     Current Outpatient Medications on File Prior to Encounter   Medication Sig    allopurinoL (ZYLOPRIM) 300 MG tablet Take 1 tablet (300 mg total) by mouth once daily.    aspirin (ECOTRIN) 81 MG EC tablet Take 81 mg by mouth once daily.    carvediloL (COREG) 3.125 MG tablet Take 1 tablet (3.125 mg total) by mouth 2 (two) times daily with meals.    ezetimibe (ZETIA) 10 mg tablet TAKE 1 TABLET(10 MG) BY MOUTH EVERY DAY    fish oil-omega-3 fatty acids 300-1,000 mg capsule Take 1 capsule by mouth 2 (two) times daily.    gabapentin (NEURONTIN) 100 MG capsule TAKE 2 CAPSULES(200 MG) BY MOUTH EVERY EVENING    torsemide (DEMADEX) 20 MG Tab TAKE 4 TABLETS(80 MG) BY MOUTH TWICE DAILY    acetaminophen (TYLENOL) 325 MG tablet Take 650 mg by mouth every 6 (six) hours as needed for Pain.    blood sugar diagnostic Strp To check BG 2 times daily, to use with insurance preferred meter. e11.65    blood-glucose meter kit To check BG 2 times daily, to use with insurance preferred meter    brimonidine 0.2% (ALPHAGAN) 0.2 % Drop INSTILL 1 DROP IN BOTH EYES THREE TIMES DAILY    clopidogreL (PLAVIX) 75 mg tablet Take 1 tablet (75 mg total) by mouth once daily.    coenzyme Q10 100 mg capsule Take 100 mg by mouth every morning.    collagenase (SANTYL) ointment Apply topically to diabetic ulcer on lower  "right leg once daily.    dorzolamide-timolol 2-0.5% (COSOPT) 22.3-6.8 mg/mL ophthalmic solution Place 1 drop into both eyes 3 (three) times daily.    HYDROcodone-acetaminophen (NORCO) 5-325 mg per tablet Take 1 tablet by mouth every 12 (twelve) hours as needed for Pain.    insulin degludec-liraglutide (XULTOPHY 100/3.6) 100 unit-3.6 mg /mL (3 mL) Pen Inject 35 Units into the skin once daily.    lancets Misc To check BG 2 times daily, to use with insurance preferred meter. Dx code e11.65    loratadine (CLARITIN) 10 mg tablet Take 10 mg by mouth daily as needed for Allergies.    metOLazone (ZAROXOLYN) 2.5 MG tablet Take 1 tablet (2.5 mg total) by mouth once daily.    MULTIVIT,THER IRON,CA,FA & MIN (MULTIVITAMIN) Tab Take 1 tablet by mouth every morning.     pen needle, diabetic (BD ULTRA-FINE AMADOR PEN NEEDLE) 32 gauge x 5/32" Ndle 1 Device by Misc.(Non-Drug; Combo Route) route 2 (two) times a day.     Family History       Problem Relation (Age of Onset)    Diabetes Mother    Heart disease Brother    No Known Problems Father, Sister, Maternal Aunt, Maternal Uncle, Paternal Aunt, Paternal Uncle, Maternal Grandmother, Maternal Grandfather, Paternal Grandmother, Paternal Grandfather          Tobacco Use    Smoking status: Former Smoker     Packs/day: 1.00     Years: 3.00     Pack years: 3.00     Types: Cigarettes     Quit date: 1984     Years since quittin.7    Smokeless tobacco: Never Used   Substance and Sexual Activity    Alcohol use: Not Currently     Alcohol/week: 1.0 standard drink     Types: 1 Cans of beer per week     Comment: rarely    Drug use: No    Sexual activity: Not Currently     Partners: Female     Review of Systems   Constitutional: Negative.   HENT: Negative.     Eyes: Negative.    Respiratory: Negative.     Endocrine: Negative.    Hematologic/Lymphatic: Negative.    Skin: Negative.    Musculoskeletal:  Positive for joint pain.   Gastrointestinal: Negative.    Genitourinary: Negative.  "   Neurological: Negative.    Psychiatric/Behavioral: Negative.     Allergic/Immunologic: Negative.    Objective:     Vital Signs (Most Recent):  Temp: 98.2 °F (36.8 °C) (04/04/22 1751)  Pulse: 77 (04/04/22 1751)  Resp: 18 (04/04/22 1751)  BP: (!) 99/53 (04/04/22 1751)  SpO2: (!) 92 % (04/04/22 1751)   Vital Signs (24h Range):  Temp:  [97.4 °F (36.3 °C)-98.4 °F (36.9 °C)] 98.2 °F (36.8 °C)  Pulse:  [76-84] 77  Resp:  [16-18] 18  SpO2:  [92 %-95 %] 92 %  BP: ()/(53-64) 99/53     Weight: 84.4 kg (186 lb)  Body mass index is 26.69 kg/m².    SpO2: (!) 92 %  O2 Device (Oxygen Therapy): nasal cannula    No intake or output data in the 24 hours ending 04/04/22 1957    Lines/Drains/Airways       Drain  Duration                  Urethral Catheter 04/04/22 1652 Silicone 16 Fr. <1 day              Peripheral Intravenous Line  Duration                  Peripheral IV - Single Lumen 04/04/22 0737 18 G Right Forearm <1 day                    Physical Exam  Vitals reviewed.   Constitutional:       Appearance: He is well-developed.   HENT:      Head: Normocephalic.   Eyes:      Conjunctiva/sclera: Conjunctivae normal.      Pupils: Pupils are equal, round, and reactive to light.   Cardiovascular:      Rate and Rhythm: Normal rate and regular rhythm.      Heart sounds: Normal heart sounds.   Pulmonary:      Effort: Pulmonary effort is normal.      Breath sounds: Normal breath sounds.   Abdominal:      General: Bowel sounds are normal.      Palpations: Abdomen is soft.   Musculoskeletal:         General: Signs of injury present.      Cervical back: Normal range of motion and neck supple.      Comments: Right BKA   Skin:     General: Skin is warm.   Neurological:      Mental Status: He is alert and oriented to person, place, and time.     Significant Labs: BMP:   Recent Labs   Lab 04/04/22  0737   *   *   K 2.9*   CL 93*   CO2 29   BUN 78*   CREATININE 2.3*   CALCIUM 7.9*   , CMP   Recent Labs   Lab 04/04/22  0737   NA  134*   K 2.9*   CL 93*   CO2 29   *   BUN 78*   CREATININE 2.3*   CALCIUM 7.9*   PROT 5.5*   ALBUMIN 2.4*   BILITOT 0.4   ALKPHOS 87   AST 22   ALT 24   ANIONGAP 12   ESTGFRAFRICA 33*   EGFRNONAA 29*   , CBC   Recent Labs   Lab 04/04/22  0737   WBC 8.34   HGB 13.6*   HCT 41.2      , INR   Recent Labs   Lab 04/04/22  0737   INR 1.0   , Lipid Panel No results for input(s): CHOL, HDL, LDLCALC, TRIG, CHOLHDL in the last 48 hours., Troponin No results for input(s): TROPONINI in the last 48 hours., and All pertinent lab results from the last 24 hours have been reviewed.    Significant Imaging: Echocardiogram: Transthoracic echo (TTE) complete (Cupid Only): No results found. However, due to the size of the patient record, not all encounters were searched. Please check Results Review for a complete set of results.

## 2022-04-05 NOTE — PROGRESS NOTES
Patient is cleared for surgery at high risk. This was discussed at length with Dr Elena and patient. Family not around during discussion but patient wishes to move forward with surgery while understanding his elevated risks.

## 2022-04-05 NOTE — DISCHARGE SUMMARY
[facility]  Discharge Summary      Patient Name: Marvin Ray  MRN: [id number]  Patient Class: IP- Inpatient  Admission Date: 4/4/2022  Hospital Length of Stay: 1 days  Discharge Date and Time:  04/05/2022 at 14:12    Attending Physician: Noe Seo DO   Discharging Provider: Noe Seo DO  Primary Care Provider: Rubén Davis MD      HPI:   Mr. Ray is a 61 yo M who presents after a mechanical fall in the bathroom with a CC of L leg pain. He was found to have L femur fracture. Patient has a known history of CAD and systolic heart failure with a ICD and EF of 20%.  His xray was read as pulmonary edema although he states he is not SOB at all and has not had a CHF exacerbation in many years. He as liver cirrhosis with ascites that gets drained every Thursday.  He is s/p R BKA and is mainly wheelchair bound but is going to out patient PT/OT 3 times a week to learn to walk with a prosthesis.       Procedure(s) (LRB):  INSERTION, INTRAMEDULLARY HERMAN, FEMUR GAMMA NAIL, JOEL (Left)      Hospital Course:   61 yo M with past medical hx of HTN, DM, gout, CAD, right BKA, CKD, HLD, and combined CHF admitted on 04/04/2022 for comminuted and displaced intertrochanteric fracture of the left femur, acute on chronic heart failure, LAYNE, and hypokalemia.     Patient s/p mechanical fall. XR of left femur and hip showed comminuted and displaced intertrochanteric fracture of the left femur. CXR with Interstitial pulmonary edema and small right pleural effusion. BNP elevated >1300. Patient restarted on home diuretics; held on 04/05 given hypotension and worsening Creatine. Echo with EF of 15%,G3DD, and pHTN. Cardiology consulted, patient is high risk for surgery. Patient is aware of his risks prior to surgery. Dr. Lazcano discussed code status on 04/04/22, patient agreed to remained full code. Orthopedic consulted; plan for left femur fracture surgery on 04/05. Patient coded twice  "intraoperatively.    CODE blue called overhead. Was contacted and arrived to patient's bedside.  ICU team running the code. Of note, patient coded twice in the OR but was able to achieve ROSC. Patient arrived to ICU on epi drip. Patient rhythm went into VTach and code blue was called and compressions started.    Discussed with patient's wife in the [facility] with Palliative care team (Delmis Hobbs).  Informed the patient's sister that patient is coding again, likely due to cardiac arrest given CHF with worsening EF of 15%.  Discussed that his CHF likely worsened intraoperatively. Sister confirmed that patient made it very clear that if this was to happen again and that if patient is unable to return back to his normal self or required life support, that the patient would not want this.  She states that the patient said that "I do not want to be a vegetable, no matter what." After careful consideration, the patient's sister agree to stopped attempts at resuscitation and make him DNR. States that the patient would not want to continue. States "it's God's Will".      Discussed with rapid/code team of the patient's sister wishes and expressed that the patient would not want this continued. Time of death was called at 14:12    No spontaneous movements were present.  There was no response to verbal or tactile stimuli.  No breath sounds were appreciated over either lung field.  No carotid pulses were palpable.  No radial pulses were palpable.  No heart sounds were auscultated over the entire precordium.      Patient pronounced dead at 14:12 on 04/05/2022.  Family at bedside.  Emotional support provided. Likely cause of death cardiac arrest due to worsening heart failure intraoperatively.        Goals of Care Treatment Preferences:  Code Status: Full Code      Consults:   Consults (From admission, onward)        Status Ordering Provider     Inpatient consult to Palliative Care  Once        Provider:  Delmis Hobbs, SCOTT "    Ordered ELIZABETH RODRIGUEZ     Inpatient consult to Social Work/Case Management  Once        Provider:  (Not yet assigned)    Acknowledged JUJU CORTEZ.     Inpatient consult to Registered Dietitian/Nutritionist  Once        Provider:  (Not yet assigned)    Acknowledged JUJU CORTEZ     Inpatient consult to Cardiology  Once        Provider:  (Not yet assigned)    Completed LUIZ MANUEL     Inpatient consult to Orthopedic Surgery  Once        Provider:  Joe Elena MD    Completed JULIO HUSTON          No new Assessment & Plan notes have been filed under this hospital service since the last note was generated.  Service: Hospital Medicine    Final Active Diagnoses:    Diagnosis Date Noted POA    PRINCIPAL PROBLEM:  Coronary artery disease involving native coronary artery of native heart without angina pectoris [I25.10] 05/31/2016 Yes    Fracture of femur [S72.90XA] 04/04/2022 Yes    Acute systolic CHF (congestive heart failure) [I50.21] 04/04/2022 Yes    Acute-on-chronic kidney injury [N17.9, N18.9] 11/08/2018 Yes    Hypokalemia [E87.6] 04/04/2022 Yes    CKD stage 3 due to type 2 diabetes mellitus [E11.22, N18.30] 05/01/2019 Yes    Essential hypertension [I10] 05/06/2016 Yes     Chronic    DM type 2 with diabetic peripheral neuropathy [E11.42] 10/22/2014 Yes     Chronic    PVD (peripheral vascular disease) [I73.9] 10/10/2018 Yes     Chronic    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] 10/22/2015 Yes     Chronic    Ischemic cardiomyopathy [I25.5] 01/20/2020 Yes    Cirrhosis of liver with ascites [K74.60, R18.8] 04/04/2022 Yes    Hepatosplenomegaly [R16.2] 10/12/2018 Yes    Debility [R53.81] 12/25/2018 Yes    Aortic atherosclerosis [I70.0] 03/15/2021 Yes    Anemia of chronic disease [D63.8] 04/04/2022 Yes    ICD (implantable cardioverter-defibrillator), single, in situ [Z95.810] 03/15/2021 Yes    Palliative care encounter [Z51.5] 12/14/2018 Not Applicable      Problems Resolved During  this Admission:       Discharged Condition:     Disposition:     Follow Up:    Patient Instructions:   No discharge procedures on file.    Significant Diagnostic Studies:   Imaging Results           X-Ray Femur Ap/Lat Left (Final result)  Result time 22 08:27:52    Final result by Brandin Mohamud MD (22 08:27:52)                 Impression:      Comminuted and displaced intertrochanteric fracture of the left femur.    This report was flagged in Epic as abnormal.      Electronically signed by: Brandin Mohamud  Date:    2022  Time:    08:27             Narrative:    EXAMINATION:  XR FEMUR 2 VIEW LEFT    CLINICAL HISTORY:  Fracture of unspecified part of neck of left femur, initial encounter for closed fracture    TECHNIQUE:  AP and lateral views of the left femur were performed.    COMPARISON:  None}    FINDINGS:  Comminuted intertrochanteric fracture the left femur, with involvement of the lesser trochanter.  There is impaction and apex lateral angulation.  No hip dislocation.    Diffuse osteopenia.  No additional fractures are identified.  Mild degenerative changes in the knee.  Small knee joint effusion.                               X-Ray Chest AP Portable (Final result)  Result time 22 08:31:19    Final result by Brandin oMhamud MD (22 08:31:19)                 Impression:      Interstitial pulmonary edema and small right pleural effusion.      Electronically signed by: Brandin Mohamud  Date:    2022  Time:    08:31             Narrative:    EXAMINATION:  XR CHEST AP PORTABLE    CLINICAL HISTORY:  pre-operative;    TECHNIQUE:  Single frontal view of the chest was performed.    COMPARISON:  Chest radiograph 2021    FINDINGS:  Lines and tubes: Left chest wall single lead AICD.    Heart and mediastinum: The cardiac silhouette appears enlarged.    Pleura: Small right pleural effusion.  No pneumothorax.    Lungs: Lung volumes are low.  There is pulmonary vascular indistinctness  and perihilar fullness.  No focal consolidation.    Soft tissue/bone: Scattered degenerative changes.  No acute osseous abnormality                               X-Ray Hip 2 or 3 views Left (with Pelvis when performed) (Final result)  Result time 04/04/22 07:38:53    Final result by Yeyo Crystal MD (04/04/22 07:38:53)                 Impression:      Left femoral acute intertrochanteric fracture, as above.      Electronically signed by: Yeyo Crystal MD  Date:    04/04/2022  Time:    07:38             Narrative:    EXAMINATION:  XR HIP WITH PELVIS WHEN PERFORMED, 2 OR 3 VIEWS LEFT    CLINICAL HISTORY:  Pain in left hip    TECHNIQUE:  AP view of the pelvis and frog leg lateral view of the left hip were performed.    COMPARISON:  CT abdomen and pelvis 10/16/2018    FINDINGS:  Acute fracture through the left femoral intertrochanteric region with mild impaction and mild varus angulation of the femoral shaft with respect to the fracture site.  Additionally, the lesser trochanter is mildly displaced medially up to 1 cm with slight angulation.  Fracture planes also extend to the greater trochanter without significant displacement.  No dislocation or destructive osseous process.  Mild degenerative change at the bilateral hips and pelvis.  Scattered atherosclerotic vascular calcifications and pelvic phleboliths noted.  No subcutaneous emphysema or radiodense retained foreign body.                                  Pending Diagnostic Studies:     Procedure Component Value Units Date/Time    EKG 12-lead [[id number]]     Order Status: Sent Lab Status: No result            Indwelling Lines/Drains at time of discharge:   Lines/Drains/Airways     Central Venous Catheter Line  Duration           Percutaneous Central Line Insertion/Assessment - Triple Lumen  04/05/22 1215 right internal jugular <1 day          Drain  Duration                Urethral Catheter 04/04/22 1652 Silicone 16 Fr. <1 day          Airway  Duration                 Airway - Non-Surgical 04/05/22 1225 Endotracheal Tube <1 day          Arterial Line  Duration           Arterial Line 04/05/22 1131 Right Radial <1 day                Time spent on the discharge of patient: 45 minutes    Critical care time spent on the evaluation and treatment of severe organ dysfunction, review of pertinent labs and imaging studies, discussions with consulting providers and discussions with patient/family: 45 minutes.     Noe Seo DO  Department of Hospital Medicine  [facility] - Intensive Care

## 2022-04-05 NOTE — ASSESSMENT & PLAN NOTE
Patient with un revascularized severe triple-vessel coronary artery disease.  Has been medically managed since 2016. Denies any chest pains at rest or on exertion.  Exercise tolerance limited by right BKA and need to walk with a walker.  States walks very slowly.  Patient needs to go for urgent orthopedic surgery.  Patient is high risk for coronary ischemia because of underlying severe coronary artery disease.  Recommend minimizing risk by keeping close eye on patient's hemodynamics during surgery, keeping heart rate around 60 beats per minute.

## 2022-04-05 NOTE — ANESTHESIA PROCEDURE NOTES
Arterial    Diagnosis: CHF    Patient location during procedure: pre-op  Procedure start time: 4/5/2022 11:31 AM  Timeout: 4/5/2022 11:30 AM  Procedure end time: 4/5/2022 11:35 AM    Staffing  Authorizing Provider: Tre Alford MD  Performing Provider: Tre Alford MD    Anesthesiologist was present at the time of the procedure.    Preanesthetic Checklist  Completed: patient identified, IV checked, site marked, risks and benefits discussed, surgical consent, monitors and equipment checked, pre-op evaluation, timeout performed and anesthesia consent givenArterial  Skin Prep: chlorhexidine gluconate  Local Infiltration: lidocaine  Orientation: right  Location: radial    Catheter Size: 20 G  Catheter placement by Ultrasound guidance. Heme positive aspiration all ports.   Vessel Caliber: small, patent, compressibility normal  Vascular Doppler:  not done  Needle advanced into vessel with real time Ultrasound guidance.  Guidewire confirmed in vessel.  Sterile sheath used.  Image recorded and saved.Insertion Attempts: 1  Assessment  Dressing: secured with tape and tegaderm

## 2022-04-05 NOTE — ASSESSMENT & PLAN NOTE
BNP  Recent Labs   Lab 04/05/22  0011   BNP 1,367*     CXR: Interstitial pulmonary edema and small right pleural effusion  EKG personally reviewed and shows NSR, prolonged QTc  Started CHF pathway  On home metolazone 2.5mg and toresemide 20mg- will hold at this time given low BP  Monitor ins and outs  TTE as above  Continue Bb  Nutrition consulted for low salt cardiac diet education  Appears Euvolemic on exam  Cardiology consulted

## 2022-04-05 NOTE — ACP (ADVANCE CARE PLANNING)
"Patient just transferred from OR to ICU and code blue called.  CPR in process.   No family at bedside.  Patient lives with his sister who was in chapel. Together with Dr Seo met with patient's sister to update. She states she knew he had already "coded" in OR 2 x and that his children were aware he may not live and 2 of them are on their way to hospital. She was very tearful and states patient knew the surgery would be risky but wanted to take a chance because he was not happy with his current state of being bed bound.   " He did not want to be a vegetable and told the doctor yesterday that if he was not going to be able to return to his previous QOL(up and around) then he didn't want to live". We discussed that after losing pulse several times and going without oxygen the damage would be significant and outcome poor should he survive. She states he would not want this. We recomended DNR and she agreed based on previous discussions with patient.   Went back to ICU and let team know. Resuscitative efforts had stopped to check for pulse. Patient had a very low pulse rate and then lost it again.. Pronounced at 1414  Went back to let family know. Patient's 2 children had arrived and were escorted to bedside.Emotional support provided. present    "

## 2022-04-05 NOTE — ASSESSMENT & PLAN NOTE
Presented with LAYNE  Cr on admit 2.3  Baseline Cr 1.3-1.8  Suspect due to CHF (passive congestion) vs over-diuresis.   Monitor Is/Os  Hold home diuretics at this time  Avoid nephrotoxins, renal dose all meds.   Monitor

## 2022-04-05 NOTE — CONSULTS
[facility] - Telemetry  Cardiology  Consult Note    Patient Name: Marvin Ray  MRN: [id number]  Admission Date: 4/4/2022  Hospital Length of Stay: 0 days  Code Status: Full Code   Attending Provider: Noe Seo DO   Consulting Provider: Otoniel Stacy MD  Primary Care Physician: Rubén Davis MD  Principal Problem:Fracture of femur    Patient information was obtained from patient and ER records.     Inpatient consult to Cardiology  Consult performed by: Otoniel Stacy MD  Consult ordered by: Roberto Black MD        Subjective:     Chief Complaint:  Pre op     HPI:     Patient is a very pleasant 62-year-old man.  Significant medical problems including un revascularized triple-vessel coronary artery disease which is being managed medically.  As per notes from his cardiologist Dr. Romano, he was turned down for surgery.  Patient has limited exercise tolerance caused by his right BKA and walks with the help of a walker and prosthesis.  Fell down today leading to fracture.  Denies any resting chest pains.  Does not exert much because of amputation, walking with the help of a walker etc..  Also has known ischemic cardiomyopathy with last EF of 20%.  In 2018.  Last stress test was in 2019 which showed predominantly fixed inferior wall defect.  Needs to go for urgent orthopedic surgery tomorrow.  Currently euvolemic on exam.  Laying flat in bed without orthopnea, PND, swelling of feet.        Cardiovascular Testing:  LE art US/SIDRA 5/3/21  1. 1. Right lower extremity arterial ultrasound shows no hemodynamically significant stenosis.  Pressures indicate moderate arterial occlusive disease.  2. Left lower extremity arterial ultrasound shows no hemodynamically significant stenosis.  Pressures indicate moderate arterial occlusive disease.     SIDRA 10/23/20  1. Right lower extremity pressures and waveforms indicate no hemodynamically significant arterial occlusive disease.  The DP ankle pressure is non  compressible indicating medial calcinosis.  2. Left lower extremity pressures and waveforms indicate no hemodynamically significant arterial occlusive disease.  The DP ankle pressure is non compressible indicating medial calcinosis.     LE art US/SIDRA 10/23/20  1. Right lower extremity arterial ultrasound shows approximately 50% TP trunk stenosis.  Pressures indicate no arterial occlusive disease.  2. Left lower extremity arterial ultrasound shows no hemodynamically significant stenosis.  Pressures indicate no arterial occlusive disease.     LE venous US/reflux 8/9/19  No evidence of lower extremity DVT bilaterally.  R GSV reflux (below knee).   vein noted below knee.  L GSV reflux (below knee).   vein noted below knee.  L Giacomini vein reflux.     Echo 12/11/18  · Severely decreased left ventricular systolic function. The estimated ejection fraction is 20%  · Severe global hypokinetic wall motion. Cannot exclude RWMA.  · Septal wall has abnormal motion. Systolic and diastolic flattening of the interventricular septum consistent with right ventricle pressure and volume overload.  · Left ventricular diastolic dysfunction.  · Mild right ventricular enlargement.  · Mildly to moderately reduced right ventricular systolic function.  · Moderate tricuspid regurgitation.  · There is a large left pleural effusion.     L MPI 10/17/19  · Abnormal myocardial perfusion study  · There is a moderate amount of infarct in the inferior wall(s).In the typical distribution of the RCA territory.  · Post Stress Ejection Fraction is 17 %     Cath 5/6/16 (images prev personally reviewed and interpreted) (subsequently seen by Dr. Mathews 5/12/16 and deemed to not be a surgical candidate)  B. Summary/Post-Operative Diagnosis    Three vessel coronary artery disease.    LV systolic and diastolic dysfunction.  D. Hemodynamic Results  LVEDP (Pre): 25 mmHg  LVEDP (Post): 30 mmHg  Ejection Fraction: 35%  E. Angiographic  Results       Patient has a right dominant coronary artery.      - Left Main Coronary Artery:             The LM has luminal irregularities. There is BAKARI 3 flow.     - Left Anterior Descending Artery:             The proximal LAD has a 80% stenosis. There is BAKARI 3 flow.             The mid LAD has a 90% stenosis. There is BAKARI 3 flow. The remaining portion of the vessel is diffusely diseased.     - Left Circumflex Artery:             The mid LCX has a 60% stenosis. There is BAKARI 3 flow. The remaining portion of the vessel has luminal irregularities.     - Right Coronary Artery:             The proximal RCA has a 60% stenosis. There is BAKARI 3 flow.             The mid RCA has a 80% stenosis. There is BAKARI 3 flow. The remaining portion of the vessel has luminal irregularities.     - Posterior Descending Artery:             The proximal PDA has a 60% stenosis. There is BAKARI 3 flow. The remaining portion of the vessel has luminal irregularities.     - Radial:             The radial.  G. Recommendations  1. Routine post-cath care.  2. Risk factor reductions.  3. ASA 81mg.  4. Statin therapy.  5. Consult CV Surgery.      Past Medical History:   Diagnosis Date    Arthritis     Cellulitis     CKD (chronic kidney disease), stage III     Coronary artery disease     Diabetes mellitus     Diabetic retinopathy     Diabetic ulcer of left foot     Glaucoma     Gout     Hyperlipemia     Hypertension     ICD (implantable cardioverter-defibrillator) in place 11/02/2018    Left chest    Non-pressure chronic ulcer of other part of left foot with fat layer exposed 10/23/2018    PVD (peripheral vascular disease)     Type 2 diabetes mellitus with left diabetic foot ulcer 10/29/2018    Unsteady gait     uses a wheelchair       Past Surgical History:   Procedure Laterality Date    AHMED GLAUCOMA IMPLANT Left 2011    DONE AT [facility]    AORTOGRAPHY WITH SERIALOGRAPHY Left 4/30/2019    Procedure: AORTOGRAM, WITH  SERIALOGRAPHY, LEFT LOWER EXTREMITY, POSSIBLE INTERVENTION;  Surgeon: Mitch Chan MD;  Location: [facility] OR;  Service: Vascular;  Laterality: Left;  RN PRE OP 4-23-19.  NO H & P, No Cosent  [place]    AORTOGRAPHY WITH SERIALOGRAPHY Right 10/6/2020    Procedure: AORTOGRAM, WITH SERIALOGRAPHY, RIGHT LOWER EXTREMITY ANGIOGRAM, POSSIBLE INTERVENTION;  Surgeon: Mitch Chan MD;  Location: [facility] OR;  Service: Vascular;  Laterality: Right;  RN PREOP 10/1/2020--COVID NEGATIVE ON 10/5    AORTOGRAPHY WITH SERIALOGRAPHY Right 1/13/2021    Procedure: AORTOGRAM, WITH RIGHT LOWER EXTREMITY ANGIOGRAM, POSSIBLE INTERVENTION;  Surgeon: Mitch Chan MD;  Location: [facility] OR;  Service: Vascular;  Laterality: Right;  1PM START----NEED CONSENT  RN PREOP 1/8/2021--COVID NEGATIVE ON 1/12    AORTOGRAPHY WITH SERIALOGRAPHY Right 1/26/2021    Procedure: AORTOGRAM, RIGHT LOWER EXTREMITY ANGIOGRAM, POSSIBLE INTERVENTION;  Surgeon: Mitch Chan MD;  Location: [facility] OR;  Service: Vascular;  Laterality: Right;  RN PREOP 1/25/2021, COVID NEGATIVE ON 1/25, cxr, and ecg done    AORTOGRAPHY WITH SERIALOGRAPHY Right 3/30/2021    Procedure: AORTOGRAM, WITH RIGHT LOWER EXTREMITY ANGIOGRAM, POSSIBLE INTERVENTION;  Surgeon: Mitch Chan MD;  Location: [facility] OR;  Service: Vascular;  Laterality: Right;  RN PREOP 3/26/2021  COVID NEGATIVE    BAERVELDT GLAUCOMA IMPLANT Left 2012    WITH CATARACT EXTRACTION//DONE AT [facility]    BELOW KNEE AMPUTATION OF LOWER EXTREMITY Right 5/17/2021    Procedure: AMPUTATION, BELOW KNEE;  Surgeon: Christiana Pritchett MD;  Location: [facility] OR;  Service: Orthopedics;  Laterality: Right;  WOUND VAC -PREVENA  SUPINE---HAS--ICD- Iast interrogation -3/21  AMPUTATION TRAY  1ST CASE OKWHITNEY WHITLOCK  RN PREOP 5/13/2021     COVID --NEGATIVE ON 5/14---INCOMPLETE H/P    BIOPSY Right 12/18/2020    Procedure: Biopsy- trocar biopsy;  Surgeon: Brianna Champion DPM;  Location: [facility] OR;  Service: Podiatry;   Laterality: Right;    CARDIAC CATHETERIZATION Left 05/2016    CARDIAC DEFIBRILLATOR PLACEMENT Left 11/02/2018    CATARACT EXTRACTION W/  INTRAOCULAR LENS IMPLANT Left 2012    WITH BAERVEDT (DONE AT [facility])    CATARACT EXTRACTION W/  INTRAOCULAR LENS IMPLANT Right 09/26/2018    COMPLEX ()    CB DESTRUCTION WITH CYCLO G6 Left 02/15/2017        CYST REMOVAL      DEBRIDEMENT OF FOOT  12/28/2018    Procedure: DEBRIDEMENT, FOOT;  Surgeon: Mitch Wall MD;  Location: [facility] OR;  Service: Vascular;;    DEBRIDEMENT OF FOOT  6/5/2019    Procedure: DEBRIDEMENT, FOOT;  Surgeon: Mitch Wall MD;  Location: [facility] OR;  Service: Vascular;;    DEBRIDEMENT OF FOOT Right 3/5/2021    Procedure: DEBRIDEMENT, FOOT with Misonic device;  Surgeon: Mitch Wall MD;  Location: [facility] OR;  Service: Vascular;  Laterality: Right;    EXAMINATION UNDER ANESTHESIA Left 12/5/2018    Procedure: Exam under anesthesia, left foot debridement, washout and all other indicated procedures;  Surgeon: Mitch Wall MD;  Location: [facility] OR;  Service: Vascular;  Laterality: Left;  1030AM START  RN PREOP 12/3/2018-----AICD  SEEN BY DR JAMES 12/4    EXAMINATION UNDER ANESTHESIA Left 2/13/2019    Procedure: LEFT FOOT WOUND DEBRIDEMENT, WASHOUT AND ALL OTHER INDICATED PROCEDURES;  Surgeon: Mitch Wall MD;  Location: [facility] OR;  Service: Vascular;  Laterality: Left;  requesting 1st case start  THIS CASE 1ST PER DR WALL ON 2/1/19 @ 844AM -LO  RN PREOP 2/6/2019  HAS CARDIAC CLEARANCE    EXAMINATION UNDER ANESTHESIA Left 12/28/2018    Procedure: Exam under anesthesia, left foot debridement, left foot washout, possible left second through fifth metatarsal resection;  Surgeon: Mitch Wall MD;  Location: [facility] OR;  Service: Vascular;  Laterality: Left;  1:00pm start per julissa @ 8:58am  RN  PHONE PRE OP 12-27-18--=Pt coming from [facility] [address] [facility]  NEED CONSENT    EXAMINATION UNDER  ANESTHESIA Left 6/5/2019    Procedure: LEFT FOOT DEBRIDEMENT, WASHOUT AND ALL OTHER INDICATED PROCEDURES;  Surgeon: Mitch Chan MD;  Location: [facility] OR;  Service: Vascular;  Laterality: Left;  RN PRE OP 5-31-19------ICD------NEED CONSENT    EXAMINATION UNDER ANESTHESIA Right 11/9/2020    Procedure: RIGHT FOOT DEBRIDEMENT, WASHOUT AND ALL OTHER INDICATED PROCEDURES;  Surgeon: Mitch Chan MD;  Location: [facility] OR;  Service: Vascular;  Laterality: Right;  RN PREOP 10/27/2020, --COVID NEGATIVE ON 11/6, has cardiac clearance/[place] 10/27/2020, pt has ICD  NEEDS ORDERS    EXAMINATION UNDER ANESTHESIA Right 2/4/2021    Procedure: RIGHT FOOT DEBRIDEMENT, POSSIBLE CALCANECTOMY, POSSIBLE FIFTH TOE AMPUTATION, WASHOUT AND ALL OTHER INDICATED PROCEDURES;  Surgeon: Mitch Chan MD;  Location: [facility] OR;  Service: Vascular;  Laterality: Right;  RN PREOP 2/2/2021--COVID NEGATIVE ON 2/2  ICD    EYE SURGERY      CAT EXT OU    FOOT AMPUTATION THROUGH METATARSAL Right 3/5/2021    Procedure: Right foot wound debridement, possible transmetatarsal amputation, possible fifth toe amputation, washout;  Surgeon: Mitch Chan MD;  Location: [facility] OR;  Service: Vascular;  Laterality: Right;  MISONIX SONIC NAA LOUISE [phone] SPOKE TO HIM ON MY OFFICE @ 7:31AM ON 2-  RN PRE Op, Covid NEGATIVE ON  3-2-21.  C A  8:30AM START----NEED H/P    INCISION AND DRAINAGE Left 11/14/2018    Procedure: Incision and Drainage, left lower extremity debridement, washout;  Surgeon: Mitch Chan MD;  Location: [facility] OR;  Service: Vascular;  Laterality: Left;    INCISION AND DRAINAGE Left 11/16/2018    Procedure: INCISION AND DRAINAGE;  Surgeon: Mitch Chan MD;  Location: [facility] OR;  Service: Vascular;  Laterality: Left;    INCISION AND DRAINAGE Right 11/18/2020    Procedure: Debridement and washout right lower extremity wounds;  Surgeon: Mitch Chan MD;  [facility];   Service: Vascular;  Laterality: Right;    INCISION AND DRAINAGE Right 1/27/2021    Procedure: RIGHT FOOT DEBRIDEMENT, WASHOUT AND ALL OTHER INDICATED PROCEDURES;  Surgeon: Mitch Chan MD;  Location: [facility] OR 2ND FLR;  Service: Vascular;  Laterality: Right;    INTRAOCULAR PROSTHESES INSERTION Right 9/26/2018    Procedure: INSERTION, IOL PROSTHESIS;  Surgeon: Perla Cortés MD;  Location: [facility] OR 1ST FLR;  Service: Ophthalmology;  Laterality: Right;    PERITONEOCENTESIS N/A 3/1/2019    Procedure: PARACENTESIS, ABDOMINAL, INITIAL;  Surgeon: Dosc Diagnostic Provider;  Location: [facility] OR;  Service: Radiology;  Laterality: N/A;    PERITONEOCENTESIS N/A 3/25/2019    Procedure: PARACENTESIS, ABDOMINAL, INITIAL;  Surgeon: Dosc Diagnostic Provider;  Location: [facility] OR;  Service: Radiology;  Laterality: N/A;    PERITONEOCENTESIS N/A 7/22/2019    Procedure: PARACENTESIS, ABDOMINAL, INITIAL;  Surgeon: Dosc Diagnostic Provider;  Location: [facility] OR;  Service: Radiology;  Laterality: N/A;    PERITONEOCENTESIS N/A 8/16/2019    Procedure: PARACENTESIS, ABDOMINAL, INITIAL;  Surgeon: Dosc Diagnostic Provider;  Location: [facility] OR;  Service: Radiology;  Laterality: N/A;    PERITONEOCENTESIS N/A 9/26/2019    Procedure: PARACENTESIS, ABDOMINAL;  Surgeon: Dosc Diagnostic Provider;  Location: [facility] OR;  Service: Radiology;  Laterality: N/A;    PERITONEOCENTESIS N/A 10/11/2019    Procedure: PARACENTESIS, ABDOMINAL;  Surgeon: Dosc Diagnostic Provider;  Location: [facility] OR;  Service: Radiology;  Laterality: N/A;    PERITONEOCENTESIS N/A 10/31/2019    Procedure: PARACENTESIS, ABDOMINAL;  Surgeon: Dosc Diagnostic Provider;  Location: [facility] OR;  Service: Radiology;  Laterality: N/A;    PERITONEOCENTESIS N/A 11/18/2019    Procedure: PARACENTESIS, ABDOMINAL;  Surgeon: Dosc Diagnostic Provider;  Location: [facility] OR;  Service: Radiology;  Laterality: N/A;    PERITONEOCENTESIS N/A 12/16/2019    Procedure: PARACENTESIS, ABDOMINAL;   Surgeon: Dosc Diagnostic Provider;  Location: [facility] OR;  Service: Radiology;  Laterality: N/A;  2PM START    PERITONEOCENTESIS N/A 2/7/2020    Procedure: PARACENTESIS, ABDOMINAL;  Surgeon: Dosc Diagnostic Provider;  Location: [facility] OR;  Service: Radiology;  Laterality: N/A;  REQUESTED 10:30A START    PERITONEOCENTESIS N/A 2/19/2020    Procedure: PARACENTESIS, ABDOMINAL;  Surgeon: Dosc Diagnostic Provider;  Location: [facility] OR;  Service: Radiology;  Laterality: N/A;    PERITONEOCENTESIS N/A 2/28/2020    Procedure: PARACENTESIS, ABDOMINAL;  Surgeon: Dosc Diagnostic Provider;  Location: [facility] OR;  Service: Radiology;  Laterality: N/A;  REQUESTED 10AM START    PHACOEMULSIFICATION OF CATARACT Right 9/26/2018    Procedure: PHACOEMULSIFICATION, CATARACT;  Surgeon: Perla Cortés MD;  Location: [facility] OR [address] [place] [address];  Service: Ophthalmology;  Laterality: Right;    REPEAT ABDOMINAL PARACENTESIS N/A 2/11/2019    Procedure: PARACENTESIS, ABDOMINAL, REPEAT;  Surgeon: Dosc Diagnostic Provider;  Location: [facility] OR;  Service: Radiology;  Laterality: N/A;  1PM START    REPEAT ABDOMINAL PARACENTESIS N/A 9/12/2019    Procedure: PARACENTESIS, ABDOMINAL, REPEAT;  Surgeon: Dosc Diagnostic Provider;  Location: [facility] OR;  Service: Radiology;  Laterality: N/A;  9AM START    REPEAT ABDOMINAL PARACENTESIS N/A 12/2/2019    Procedure: PARACENTESIS, ABDOMINAL, REPEAT;  Surgeon: Dosc Diagnostic Provider;  Location: [facility] OR;  Service: Radiology;  Laterality: N/A;  3PM START    REPEAT ABDOMINAL PARACENTESIS N/A 1/10/2020    Procedure: PARACENTESIS, ABDOMINAL, REPEAT;  Surgeon: Dosc Diagnostic Provider;  Location: [facility] OR;  Service: Radiology;  Laterality: N/A;  1130AM START    REPEAT ABDOMINAL PARACENTESIS N/A 1/20/2020    Procedure: PARACENTESIS, ABDOMINAL, REPEAT;  Surgeon: Dosc Diagnostic Provider;  Location: [facility] OR;  Service: Radiology;  Laterality: N/A;  1PM START    REPEAT ABDOMINAL PARACENTESIS N/A 1/31/2020    Procedure: PARACENTESIS,  ABDOMINAL, REPEAT;  Surgeon: Dosc Diagnostic Provider;  Location: [facility] OR;  Service: Radiology;  Laterality: N/A;  10AM START    REPEAT ABDOMINAL PARACENTESIS N/A 3/16/2020    Procedure: PARACENTESIS, ABDOMINAL, REPEAT;  Surgeon: Dosc Diagnostic Provider;  [facility]: [facility] OR;  Service: Radiology;  Laterality: N/A;  10AM START    REPEAT ABDOMINAL PARACENTESIS N/A 3/30/2020    Procedure: PARACENTESIS, ABDOMINAL, REPEAT;  Surgeon: Dosc Diagnostic Provider;  Location: [facility] OR;  Service: Radiology;  Laterality: N/A;    REPEAT ABDOMINAL PARACENTESIS N/A 4/9/2020    Procedure: PARACENTESIS, ABDOMINAL, REPEAT;  Surgeon: Dosc Diagnostic Provider;  Location: [facility] OR;  Service: Radiology;  Laterality: N/A;  1130AM START    REPEAT ABDOMINAL PARACENTESIS N/A 5/8/2020    Procedure: PARACENTESIS, ABDOMINAL, REPEAT;  Surgeon: Dos Diagnostic Provider;  Location: [facility] OR;  Service: Radiology;  Laterality: N/A;  9AM START    REPEAT ABDOMINAL PARACENTESIS N/A 5/21/2020    Procedure: PARACENTESIS, ABDOMINAL, REPEAT;  Surgeon: Dos Diagnostic Provider;  Location: [facility] OR;  Service: Radiology;  Laterality: N/A;  10AM START    REPEAT ABDOMINAL PARACENTESIS N/A 6/5/2020    Procedure: PARACENTESIS, ABDOMINAL, REPEAT;  Surgeon: Dos Diagnostic Provider;  Location: [facility] OR;  Service: Radiology;  Laterality: N/A;  NOON START    REPEAT ABDOMINAL PARACENTESIS N/A 7/10/2020    Procedure: PARACENTESIS, ABDOMINAL, REPEAT;  Surgeon: Dosc Diagnostic Provider;  Location: [facility] OR;  Service: Radiology;  Laterality: N/A;  1PM START    REPEAT ABDOMINAL PARACENTESIS N/A 8/20/2020    Procedure: PARACENTESIS, ABDOMINAL, REPEAT;  Surgeon: Dosc Diagnostic Provider;  [facility]: [facility];  Service: Radiology;  Laterality: N/A;  1PM START    REPEAT ABDOMINAL PARACENTESIS N/A 9/4/2020    Procedure: PARACENTESIS, ABDOMINAL, REPEAT;  Surgeon: Dosc Diagnostic Provider;  Location: [facility] OR;  Service: Radiology;  Laterality: N/A;  11 AM START    REPEAT ABDOMINAL  PARACENTESIS N/A 9/16/2020    Procedure: PARACENTESIS, ABDOMINAL, REPEAT;  Surgeon: Dosc Diagnostic Provider;  Location: [facility] OR;  Service: Radiology;  Laterality: N/A;  START 2:00 P.M.    REPEAT ABDOMINAL PARACENTESIS N/A 9/28/2020    Procedure: PARACENTESIS, ABDOMINAL, REPEAT;  Surgeon: Dosc Diagnostic Provider;  Location: [facility] OR;  Service: Radiology;  Laterality: N/A;  1 P.M. START    REPEAT ABDOMINAL PARACENTESIS N/A 11/3/2020    Procedure: PARACENTESIS, ABDOMINAL, REPEAT;  Surgeon: Dosc Diagnostic Provider;  Location: [facility] OR;  Service: Radiology;  Laterality: N/A;  11AM START    REPEAT ABDOMINAL PARACENTESIS N/A 11/13/2020    Procedure: PARACENTESIS, ABDOMINAL, REPEAT;  Surgeon: Dos Diagnostic Provider;  Location: [facility] OR;  Service: Radiology;  Laterality: N/A;  12PM START    REPEAT ABDOMINAL PARACENTESIS N/A 11/23/2020    Procedure: PARACENTESIS, ABDOMINAL, REPEAT;  Surgeon: Dos Diagnostic Provider;  Location: [facility] OR;  Service: Radiology;  Laterality: N/A;    REPEAT ABDOMINAL PARACENTESIS N/A 12/1/2020    Procedure: PARACENTESIS, ABDOMINAL, REPEAT;  Surgeon: Dosc Diagnostic Provider;  Location: [facility] OR;  Service: Radiology;  Laterality: N/A;  11AM START    REPEAT ABDOMINAL PARACENTESIS N/A 12/8/2020    Procedure: PARACENTESIS, ABDOMINAL, REPEAT;  Surgeon: Dosc Diagnostic Provider;  Location: [facility] OR;  Service: Radiology;  Laterality: N/A;  93AM START    REPEAT ABDOMINAL PARACENTESIS N/A 12/17/2020    Procedure: PARACENTESIS, ABDOMINAL, REPEAT;  Surgeon: Dosc Diagnostic Provider;  Location: [facility] OR;  Service: Radiology;  Laterality: N/A;  10AM START    REPEAT ABDOMINAL PARACENTESIS N/A 12/30/2020    Procedure: PARACENTESIS, ABDOMINAL, REPEAT;  Surgeon: Dosc Diagnostic Provider;  Location: [facility] OR;  Service: Radiology;  Laterality: N/A;  9 A.M. START    REPEAT ABDOMINAL PARACENTESIS N/A 1/12/2021    Procedure: PARACENTESIS, ABDOMINAL, REPEAT;  Surgeon: Dosc Diagnostic Provider;  Location: [facility] OR;   Service: Radiology;  Laterality: N/A;  10 AM START    REPEAT ABDOMINAL PARACENTESIS N/A 2/10/2021    Procedure: PARACENTESIS, ABDOMINAL, REPEAT;  Surgeon: Dosc Diagnostic Provider;  Location: [facility] OR;  Service: Radiology;  Laterality: N/A;  2 P.M. START    REPEAT ABDOMINAL PARACENTESIS N/A 2/18/2021    Procedure: PARACENTESIS, ABDOMINAL, REPEAT;  Surgeon: Dosc Diagnostic Provider;  Location: [facility] OR;  Service: Radiology;  Laterality: N/A;  230PM START    REPEAT ABDOMINAL PARACENTESIS N/A 2/26/2021    Procedure: PARACENTESIS, ABDOMINAL, REPEAT;  Surgeon: Dosc Diagnostic Provider;  Location: [facility] OR;  Service: Radiology;  Laterality: N/A;    REPEAT ABDOMINAL PARACENTESIS N/A 3/4/2021    Procedure: PARACENTESIS, ABDOMINAL, REPEAT;  Surgeon: Dosc Diagnostic Provider;  Location: [facility] OR;  Service: Radiology;  Laterality: N/A;  9AM START    REPEAT ABDOMINAL PARACENTESIS N/A 3/12/2021    Procedure: PARACENTESIS, ABDOMINAL, REPEAT;  Surgeon: Dosc Diagnostic Provider;  Location: [facility] OR;  Service: Radiology;  Laterality: N/A;  10:00 START TIME  NO PRE-OP REQUIRED    REPEAT ABDOMINAL PARACENTESIS N/A 4/7/2021    Procedure: PARACENTESIS, ABDOMINAL, REPEAT;  Surgeon: Dosc Diagnostic Provider;  Location: [facility] OR;  Service: Radiology;  Laterality: N/A;  1 P.M. START    REPEAT ABDOMINAL PARACENTESIS N/A 4/16/2021    Procedure: PARACENTESIS, ABDOMINAL, REPEAT;  Surgeon: Dosc Diagnostic Provider;  Location: [facility] OR;  Service: Radiology;  Laterality: N/A;  9:OO START  NO PRE-OP REQ'D    REPEAT ABDOMINAL PARACENTESIS N/A 4/26/2021    Procedure: PARACENTESIS, ABDOMINAL, REPEAT;  Surgeon: Dosc Diagnostic Provider;  Location: [facility] OR;  Service: Radiology;  Laterality: N/A;  9AM START    REPEAT ABDOMINAL PARACENTESIS N/A 5/6/2021    Procedure: PARACENTESIS, ABDOMINAL, REPEAT;  Surgeon: Dosc Diagnostic Provider;  Location: [facility] OR;  Service: Radiology;  Laterality: N/A;  3PM START    REPEAT ABDOMINAL PARACENTESIS N/A 5/14/2021     Procedure: PARACENTESIS, ABDOMINAL, REPEAT;  Surgeon: [facility] Diagnostic Provider;  Location: [facility] OR;  Service: Radiology;  Laterality: N/A;  1:00PM START  NO PRE-OP REQ'D    REPEAT ABDOMINAL PARACENTESIS N/A 5/24/2021    Procedure: PARACENTESIS, ABDOMINAL, REPEAT;  Surgeon: [facility] Diagnostic Provider;  Location: [facility] OR;  Service: Radiology;  Laterality: N/A;  2 P.M START TIME    REPEAT ABDOMINAL PARACENTESIS N/A 6/4/2021    Procedure: PARACENTESIS, ABDOMINAL, REPEAT;  Surgeon: [facility] Diagnostic Provider;  Location: [facility] OR;  Service: Radiology;  Laterality: N/A;  11AM START    REPEAT ABDOMINAL PARACENTESIS N/A 6/14/2021    Procedure: PARACENTESIS, ABDOMINAL, REPEAT;  Surgeon: [facility] Diagnostic Provider;  Location: [facility] OR;  Service: Radiology;  Laterality: N/A;  8AM START    REPEAT ABDOMINAL PARACENTESIS N/A 6/23/2021    Procedure: PARACENTESIS, ABDOMINAL, REPEAT;  Surgeon: [facility] Diagnostic Provider;  Location: [facility] OR;  Service: Radiology;  Laterality: N/A;    REVISION OF PROCEDURE INVOLVING GLAUCOMA DRAINAGE DEVICE Left 10/2/2019    EXTRUDING BAERVELDT /WITH PERICARDIAL PATCH GRAFT ()    Right foot surgery  10/2014    TOE AMPUTATION Right     first and second    TOE AMPUTATION Left 11/16/2018    Procedure: AMPUTATION, TOES  2-5;  Surgeon: Mitch Chan MD;  Location: [facility] OR;  Service: Vascular;  Laterality: Left;    TOE AMPUTATION Left 12/5/2018    Procedure: AMPUTATION, TOE;  Surgeon: Mitch Chan MD;  Location: [facility] OR;  Service: Vascular;  Laterality: Left;  left great toe    TONSILLECTOMY         Review of patient's allergies indicates:   Allergen Reactions    Statins-hmg-coa reductase inhibitors      Generalized Pain    Onglyza [saxagliptin]     Penicillins Rash       Current Facility-Administered Medications on File Prior to Encounter   Medication    lactated ringers infusion    lidocaine (PF) 10 mg/ml (1%) injection 10 mg     Current Outpatient Medications on File Prior  to Encounter   Medication Sig    allopurinoL (ZYLOPRIM) 300 MG tablet Take 1 tablet (300 mg total) by mouth once daily.    aspirin (ECOTRIN) 81 MG EC tablet Take 81 mg by mouth once daily.    carvediloL (COREG) 3.125 MG tablet Take 1 tablet (3.125 mg total) by mouth 2 (two) times daily with meals.    ezetimibe (ZETIA) 10 mg tablet TAKE 1 TABLET(10 MG) BY MOUTH EVERY DAY    fish oil-omega-3 fatty acids 300-1,000 mg capsule Take 1 capsule by mouth 2 (two) times daily.    gabapentin (NEURONTIN) 100 MG capsule TAKE 2 CAPSULES(200 MG) BY MOUTH EVERY EVENING    torsemide (DEMADEX) 20 MG Tab TAKE 4 TABLETS(80 MG) BY MOUTH TWICE DAILY    acetaminophen (TYLENOL) 325 MG tablet Take 650 mg by mouth every 6 (six) hours as needed for Pain.    blood sugar diagnostic Strp To check BG 2 times daily, to use with insurance preferred meter. e11.65    blood-glucose meter kit To check BG 2 times daily, to use with insurance preferred meter    brimonidine 0.2% (ALPHAGAN) 0.2 % Drop INSTILL 1 DROP IN BOTH EYES THREE TIMES DAILY    clopidogreL (PLAVIX) 75 mg tablet Take 1 tablet (75 mg total) by mouth once daily.    coenzyme Q10 100 mg capsule Take 100 mg by mouth every morning.    collagenase (SANTYL) ointment Apply topically to diabetic ulcer on lower right leg once daily.    dorzolamide-timolol 2-0.5% (COSOPT) 22.3-6.8 mg/mL ophthalmic solution Place 1 drop into both eyes 3 (three) times daily.    HYDROcodone-acetaminophen (NORCO) 5-325 mg per tablet Take 1 tablet by mouth every 12 (twelve) hours as needed for Pain.    insulin degludec-liraglutide (XULTOPHY 100/3.6) 100 unit-3.6 mg /mL (3 mL) Pen Inject 35 Units into the skin once daily.    lancets Misc To check BG 2 times daily, to use with insurance preferred meter. Dx code e11.65    loratadine (CLARITIN) 10 mg tablet Take 10 mg by mouth daily as needed for Allergies.    metOLazone (ZAROXOLYN) 2.5 MG tablet Take 1 tablet (2.5 mg total) by mouth once daily.     "MULTIVIT,THER IRON,CA,FA & MIN (MULTIVITAMIN) Tab Take 1 tablet by mouth every morning.     pen needle, diabetic (BD ULTRA-FINE AMADOR PEN NEEDLE) 32 gauge x 5/32" Ndle 1 Device by Misc.(Non-Drug; Combo Route) route 2 (two) times a day.     Family History       Problem Relation (Age of Onset)    Diabetes Mother    Heart disease Brother    No Known Problems Father, Sister, Maternal Aunt, Maternal Uncle, Paternal Aunt, Paternal Uncle, Maternal Grandmother, Maternal Grandfather, Paternal Grandmother, Paternal Grandfather          Tobacco Use    Smoking status: Former Smoker     Packs/day: 1.00     Years: 3.00     Pack years: 3.00     Types: Cigarettes     Quit date: 1984     Years since quittin.7    Smokeless tobacco: Never Used   Substance and Sexual Activity    Alcohol use: Not Currently     Alcohol/week: 1.0 standard drink     Types: 1 Cans of beer per week     Comment: rarely    Drug use: No    Sexual activity: Not Currently     Partners: Female     Review of Systems   Constitutional: Negative.   HENT: Negative.     Eyes: Negative.    Respiratory: Negative.     Endocrine: Negative.    Hematologic/Lymphatic: Negative.    Skin: Negative.    Musculoskeletal:  Positive for joint pain.   Gastrointestinal: Negative.    Genitourinary: Negative.    Neurological: Negative.    Psychiatric/Behavioral: Negative.     Allergic/Immunologic: Negative.    Objective:     Vital Signs (Most Recent):  Temp: 98.2 °F (36.8 °C) (22)  Pulse: 77 (22)  Resp: 18 (22)  BP: (!) 99/53 (22)  SpO2: (!) 92 % (22)   Vital Signs (24h Range):  Temp:  [97.4 °F (36.3 °C)-98.4 °F (36.9 °C)] 98.2 °F (36.8 °C)  Pulse:  [76-84] 77  Resp:  [16-18] 18  SpO2:  [92 %-95 %] 92 %  BP: ()/(53-64) 99/53     Weight: 84.4 kg (186 lb)  Body mass index is 26.69 kg/m².    SpO2: (!) 92 %  O2 Device (Oxygen Therapy): nasal cannula    No intake or output data in the 24 hours ending 22 " 1957    Lines/Drains/Airways       Drain  Duration                  Urethral Catheter 04/04/22 1652 Silicone 16 Fr. <1 day              Peripheral Intravenous Line  Duration                  Peripheral IV - Single Lumen 04/04/22 0737 18 G Right Forearm <1 day                    Physical Exam  Vitals reviewed.   Constitutional:       Appearance: He is well-developed.   HENT:      Head: Normocephalic.   Eyes:      Conjunctiva/sclera: Conjunctivae normal.      Pupils: Pupils are equal, round, and reactive to light.   Cardiovascular:      Rate and Rhythm: Normal rate and regular rhythm.      Heart sounds: Normal heart sounds.   Pulmonary:      Effort: Pulmonary effort is normal.      Breath sounds: Normal breath sounds.   Abdominal:      General: Bowel sounds are normal.      Palpations: Abdomen is soft.   Musculoskeletal:         General: Signs of injury present.      Cervical back: Normal range of motion and neck supple.      Comments: Right BKA   Skin:     General: Skin is warm.   Neurological:      Mental Status: He is alert and oriented to person, place, and time.     Significant Labs: BMP:   Recent Labs   Lab 04/04/22  0737   *   *   K 2.9*   CL 93*   CO2 29   BUN 78*   CREATININE 2.3*   CALCIUM 7.9*   , CMP   Recent Labs   Lab 04/04/22  0737   *   K 2.9*   CL 93*   CO2 29   *   BUN 78*   CREATININE 2.3*   CALCIUM 7.9*   PROT 5.5*   ALBUMIN 2.4*   BILITOT 0.4   ALKPHOS 87   AST 22   ALT 24   ANIONGAP 12   ESTGFRAFRICA 33*   EGFRNONAA 29*   , CBC   Recent Labs   Lab 04/04/22  0737   WBC 8.34   HGB 13.6*   HCT 41.2      , INR   Recent Labs   Lab 04/04/22  0737   INR 1.0   , Lipid Panel No results for input(s): CHOL, HDL, LDLCALC, TRIG, CHOLHDL in the last 48 hours., Troponin No results for input(s): TROPONINI in the last 48 hours., and All pertinent lab results from the last 24 hours have been reviewed.    Significant Imaging: Echocardiogram: Transthoracic echo (TTE) complete (Cupid  Only): No results found. However, due to the size of the patient record, not all encounters were searched. Please check Results Review for a complete set of results.    Assessment and Plan:     * Fracture of femur  Patient with un revascularized severe triple-vessel coronary artery disease.  Has been medically managed since 2016. Denies any chest pains at rest or on exertion.  Exercise tolerance limited by right BKA and need to walk with a walker.  States walks very slowly.  Patient needs to go for urgent orthopedic surgery.  Patient is high risk for coronary ischemia because of underlying severe coronary artery disease.  Recommend minimizing risk by keeping close eye on patient's hemodynamics during surgery, keeping heart rate around 60 beats per minute.     ICD (implantable cardioverter-defibrillator), single, in situ        Ischemic cardiomyopathy  Currently euvolemic on exam.  Last echo was in 2018 and EF was 20%.  Repeat echo is pending.  Status post ICD    CKD stage 3 due to type 2 diabetes mellitus        PVD (peripheral vascular disease)        Essential hypertension        DM type 2 with diabetic peripheral neuropathy            VTE Risk Mitigation (From admission, onward)         Ordered     enoxaparin injection 30 mg  Daily         04/04/22 0088                Thank you for your consult. I will follow-up with patient. Please contact us if you have any additional questions.    Otoniel Stacy MD  Cardiology   [facility]

## 2022-04-05 NOTE — OP NOTE
[facility] [place]  Surgery Department  Operative Note    SUMMARY     Date of Procedure: 4/5/2022     Procedure: Procedure(s) (LRB):  INSERTION, INTRAMEDULLARY HERMAN, FEMUR GAMMA NAIL, JOEL (Left)     Surgeon(s) and Role:     * Joe Elena MD - Primary    Assisting Surgeon: Best    Pre-Operative Diagnosis: Left subtroch femur fx    Post-Operative Diagnosis: Post-Op Diagnosis Codes:     * Left subtroch femur fx    Anesthesia: Choice    Technical Procedures Used: IM nail    Description of the Findings of the Procedure: fx    Significant Surgical Tasks Conducted by the Assistant(s), if Applicable:  Position, prep, drape, retraction, assist with placement of the nail and reduction, wound closure    Complications: Yes - cardiac arrest. Rapid response. BP and HR returned    Estimated Blood Loss (EBL):50ml         Implants:   Implant Name Type Inv. Item Serial No.  Lot No. LRB No. Used Action   SCREW GAMMA3 10.5 THRD 105MM - [id number]  SCREW GAMMA3 10.5 THRD 105MM  3dplusme. [id number] Left 1 Implanted   GAMMA 3 SYSTEM LONG NAIL KIT R1.5, TI, LEFT [id number] DEGREE     [id number] Left 1 Implanted       Specimens:   Specimen (24h ago, onward)            None                  Condition: Good    Disposition: PACU - hemodynamically stable.    Attestation: I performed the procedure.    Procedure in detail:    After proper consents were obtained, patient was taken operating room administered general anesthesia.  Left lower extremity was isolated in the fracture table boot and reduction was performed and held with fracture table.  This was confirmed on multiple planes of fluoroscopy.  Left lower extremity was then prepped and draped in normal sterile fashion.  Incision was made proximal to the greater trochanter and dissection carried down through the ITB band.  Guide pin was inserted under fluoroscopic guidance into the greater trochanter and advanced distally.  The guide pin was checked under  fluoroscopy and adjusted slightly.  Once it was in the desired position the opening Reamer was passed over the guidewire and the guidewire was removed.  Reduction tool was then inserted and a long guidewire was passed distally.  This confirmed to be in good position distally.  Depth was measured and an 11 x 400 nail was selected.  One-step reaming was performed with a 12.5 Reamer an 11 by 400 nail was seated over the long guidewire and the guidewire was removed.  Pressure was applied against the lateral aspect of the proximal fragment to achieve further reduction.  Incision was made tissues were spread drill tubes were advanced guidewire was advanced and checked to be in good position under fluoroscopy 1 step reaming was then performed and 105 mm lag screw was inserted.  Set screw was then inserted and the guidewire was removed.  Proximal targeting guide was then removed and permanent pictures were taken of the proximal end of the nail showing excellent placement of hardware and acceptable reduction.  Distal locking was then performed.  Incision was made, tissues were spread and drill bit was advanced across the proximal cortices and through the nail.  At this point patient will need cardiac arrest and chest compressions were started.  Transcarpal brought in and medications were administered.  Screws inserted followed by passage of the drill bit in place with a 2nd screw of chest compressions it started.  Draping was then taken down and wounds were closed quickly still in the sterile technique.  Chest compressions continued in multiple rounds of medication were administered in patient's pulse and heart and blood pressure returned.  Patient was judged to be in critical condition of the no longer in cardiac arrest.  Patient remained intubated was transferred to the ICU in critical condition

## 2022-04-05 NOTE — ANESTHESIA PROCEDURE NOTES
Central Line    Diagnosis: CHF  Patient location during procedure: pre-op  Timeout: 4/5/2022 11:40 AM  Procedure end time: 4/5/2022 12:00 PM    Staffing  Authorizing Provider: Tre Alford MD  Performing Provider: Tre Alford MD    Staffing  Performed: anesthesiologist   Anesthesiologist was present at the time of the procedure.  Preanesthetic Checklist  Completed: patient identified, IV checked, site marked, risks and benefits discussed, surgical consent, monitors and equipment checked, pre-op evaluation, timeout performed and anesthesia consent given  Indication   Indication: med administration, vascular access     Anesthesia   local infiltration    Central Line   Skin Prep: skin prepped with Betadine, skin prep agent completely dried prior to procedure  Sterile Barriers Followed: Yes    All five maximal barriers used- gloves, gown, cap, mask, and large sterile sheet    hand hygiene performed prior to central venous catheter insertion  Location: right internal jugular.   Catheter type: triple lumen  Catheter Size: 7 Fr  Inserted Catheter Length: 16 cm  Ultrasound: vascular probe with ultrasound   Vessel Caliber: medium, patent  Vascular Doppler:  not done, compressibility normal  Needle advanced into vessel with real time Ultrasound guidance.  Guidewire confirmed in vessel.  Image recorded and saved.  sterile gel and probe cover used in ultrasound-guided central venous catheter insertion   Manometry: Venous cannualation confirmed by visual estimation of blood vessel pressure using manometry.  Insertion Attempts: 1   Securement:line sutured, chlorhexidine patch, sterile dressing applied and blood return through all ports    Post-Procedure   X-Ray: successful placement   Adverse Events:none      Guidewire Guidewire removed intact. Guidewire removed intact, verified with nurse.

## 2022-04-05 NOTE — ANESTHESIA POSTPROCEDURE EVALUATION
Anesthesia Post Evaluation    Patient: Marvin Ray    Procedure(s) Performed: Procedure(s) (LRB):  INSERTION, INTRAMEDULLARY HERMAN, FEMUR GAMMA NAIL, JOEL (Left)    Final Anesthesia Type: general      Patient location during evaluation: ICU  Patient participation: No - Unable to Participate, Intubation  Post-procedure vital signs: reviewed and not stable  Pain management: adequate  Airway patency: patent      Anesthetic complications: yes  Perioperative Events: unplanned ICU admission        Cardiovascular status: blood pressure returned to baseline  Respiratory status: ETT and intubated  Hydration status: euvolemic  Follow-up not needed.  Comments: Patient PEA arrest x 2 in OR. Once return of pulse second time transported patient to ICU intubated with bag mask ventilation and VS monitoring. On arrival to ICU patient in PEA again requiring CPR.          Vitals Value Taken Time   /75 04/05/22 1402   Temp 36.5 °C (97.7 °F) 04/05/22 1354   Pulse 0 04/05/22 1417   Resp 32 04/05/22 1411   SpO2 78 % 04/05/22 1410   Vitals shown include unvalidated device data.      No case tracking events are documented in the log.      Pain/Ralf Score: Pain Rating Prior to Med Admin: 6 (4/4/2022 10:08 PM)  Pain Rating Post Med Admin: 0 (Pt sleeping) (4/4/2022 11:08 PM)

## 2022-04-05 NOTE — NURSING
Spoke with Dr Seo regarding pts low BP. Metolazone and torsemide held per MD. Pt states he feels fine.  Will continue to monitor.

## 2022-04-05 NOTE — ANESTHESIA PROCEDURE NOTES
Intubation    Date/Time: 4/5/2022 12:25 PM  Performed by: Anai Aguiar CRNA  Authorized by: Tre Alford MD     Intubation:     Induction:  Intravenous    Intubated:  Postinduction    Mask Ventilation:  Moderately difficult with oral airway    Attempts:  1    Attempted By:  Student    Method of Intubation:  Video laryngoscopy    Blade:  Haro 3    Laryngeal View Grade: Grade I - full view of cords      Difficult Airway Encountered?: No      Complications:  None    Airway Device:  Oral endotracheal tube    Airway Device Size:  7.5    Style/Cuff Inflation:  Cuffed (inflated to minimal occlusive pressure)    Tube secured:  22    Secured at:  The lips    Placement Verified By:  Capnometry    Complicating Factors:  None    Findings Post-Intubation:  BS equal bilateral and atraumatic/condition of teeth unchanged

## 2022-04-07 ENCOUNTER — TELEPHONE (OUTPATIENT)
Dept: FAMILY MEDICINE | Facility: CLINIC | Age: 62
End: 2022-04-07
Payer: MEDICARE

## 2022-04-07 LAB
BLD PROD TYP BPU: NORMAL
BLOOD UNIT EXPIRATION DATE: NORMAL
BLOOD UNIT TYPE CODE: 600
BLOOD UNIT TYPE: NORMAL
CODING SYSTEM: NORMAL
DISPENSE STATUS: NORMAL
TRANS ERYTHROCYTES VOL PATIENT: NORMAL ML

## 2022-04-07 NOTE — TELEPHONE ENCOUNTER
----- Message from Paola Nemesio sent at 4/7/2022  9:11 AM CDT -----  Regarding: Renato Gunn  .Type: Patient Call Back    Who called: Rosetta     What is the request in detail: Director would like to know if  will be signing death certificate and also needs time of passing please     Can the clinic reply by [facility]?    Would the patient rather a call back or a response via [facility]? Call     Best call back number: [phone]

## 2022-04-13 NOTE — PHYSICIAN QUERY
PT Name: Marvin Ray  MR #: [id number]     DOCUMENTATION CLARIFICATION     CDS/: Angelina Rahman RN, CCDS              Contact information: [email or web address]  This form is a permanent document in the medical record.     Query Date: April 13, 2022    By submitting this query, we are merely seeking further clarification of documentation.  Please utilize your independent clinical judgment when addressing the question(s) below.    The Medical Record contains the following   Indicators Supporting Clinical Findings Location in Medical Record   X Heart Failure documented Acute systolic heart failure        Systolic/Diastolic HF with EF 20% (ICD in place)  CHF exacerbation 4/4 h/p,4/4 anesthesia pre-op,  4/5 prog note, 4/5 d/c summary,     4/4 anesthesia pre-op   X BNP 1,367 4/5 lab   X EF/Echo The estimated ejection fraction is 15%.  Severe left atrial enlargement.  The left ventricle is normal in size with severely decreased systolic function.  Grade III left ventricular diastolic dysfunction.  Mild mitral regurgitation.  Mild tricuspid regurgitation.  Elevated central venous pressure (15 mmHg).  The estimated PA systolic pressure is 59 mmHg.  There is pulmonary hypertension.  Severe right ventricular enlargement with moderately reduced right ventricular systolic function.  Severe right atrial enlargement.      4/5 echo   X Radiology findings Interstitial pulmonary edema and small right pleural effusion.    4/4 cxr   X Subjective/Objective Respiratory Conditions  His xray was read as pulmonary edema although he states he is not SOB     Continues to required supplemental O2.  Currently euvolemic on exam.  4/4 h/p      4/5 prog note    Recent/Current MI      Heart Transplant, LVAD      Edema, JVD     X Ascites  He as liver cirrhosis with ascites that gets drained every Thursday 4/4 h/p   X Diuretics/Meds Will give dose of lasix.  Echo /cards consult     Lasix 80 mg IV x1 4/4 h/p    4/4 mar    Other Treatment      X Other  coding again, likely due to cardiac arrest given CHF with worsening EF of 15%. 4/5 d/c summary     Heart failure is a clinical diagnosis which includes symptomatic fluid retention, elevated intracardiac pressures, and/or the inability of the heart to deliver adequate blood flow.    Heart Failure with reduced Ejection Fraction (HFrEF) or Systolic Heart Failure (loses ability to contract normally, EF is <40%)    Heart Failure with preserved Ejection Fraction (HFpEF) or Diastolic Heart Failure (stiff ventricles, does not relax properly, EF is >50%)     Heart Failure with Combined Systolic and Diastolic Failure (stiff ventricles, does not relax properly and EF is <50%)    Mid-range or mildly reduced ejection fraction (HFmrEF) is classified as systolic heart failure.   Common clues to acute exacerbation:  Rapidly progressive symptoms (w/in 2 weeks of presentation), using IV diuretics, using supplemental O2, pulmonary edema on Xray, new or worsening pleural effusion, +JVD or other signs of volume overload, MI w/in 4 weeks, and/or BNP >500  The clinical guidelines noted are only system guidelines, and do not replace the providers clinical judgment.    Provider, please clarify type and acuity of CHF.      [   ]  Acute Systolic Heart Failure (HFrEF or HFmrEF) - new diagnosis   [ X  ]  Acute on Chronic Combined Systolic and Diastolic Heart Failure - worsening of CHF signs/symptoms in preexisting CHF   [   ]  Other (please specify): ___________________________________   [  ]  Clinically Undetermined       Please document in your progress notes daily for the duration of treatment until resolved and include in your discharge summary.    References:  American Heart Association editorial staff. (2017, May). Ejection Fraction Heart Failure Measurement. American Heart Association.  https://www.heart.org/en/health-topics/heart-failure/diagnosing-heart-failure/ejection-fraction-heart-failure-measurement#:~:text=Ejection%20fraction%20(EF)%20is%20a,pushed%20out%20with%20each%20heartbeat  BRENDA Moreno (2020, December 15). Heart failure with preserved ejection fraction: Clinical manifestations and diagnosis. [employer]. https://www.[employer].Contemporary Analysis/contents/heart-failure-with-preserved-ejection-fraction-clinical-manifestations-and-diagnosis.  ICD-10-CM/PCS Coding Clinic Third Quarter ICD-10, Effective with discharges: September 8, 2020 [facility] [employer] § Heart failure with mid-range or mildly reduced ejection fraction (2020).  Form No. 46542

## 2022-07-24 NOTE — ASSESSMENT & PLAN NOTE
+ Left Foot wound s/p debridement 2/13/19, multifactorial due to diabetes, PVD and venous insufficiency followed by Dr. Chan  - Continue Cefazolin tazobactam 3g Q8 Per Dr. West's note  - ID following  -wound care following     H

## 2022-10-01 NOTE — PROGRESS NOTES
HPI     Post-op Evaluation      Additional comments: Pt states no changes or complaints today              Comments     S/p Revision of Extruding Baerveldt OS 10/2/19  (with pericardial patch graft)    MEDS:  Cosopt TID OU  Brimonidine TID OU          Last edited by Leela Chamorro MA on 12/12/2019 11:04 AM. (History)           Assessment /Plan     For exam results, see Encounter Report.    Encounter for postoperative care    Neovascular glaucoma of left eye, severe stage  -     dorzolamide-timolol 2-0.5% (COSOPT) 22.3-6.8 mg/mL ophthalmic solution; Place 1 drop into both eyes 3 (three) times daily.  Dispense: 1 Bottle; Refill: 11  -     brimonidine 0.2% (ALPHAGAN) 0.2 % Drop; Place 1 drop into both eyes 3 (three) times daily.  Dispense: 10 mL; Refill: 11    Neovascular glaucoma, right eye, mild stage  -     dorzolamide-timolol 2-0.5% (COSOPT) 22.3-6.8 mg/mL ophthalmic solution; Place 1 drop into both eyes 3 (three) times daily.  Dispense: 1 Bottle; Refill: 11  -     brimonidine 0.2% (ALPHAGAN) 0.2 % Drop; Place 1 drop into both eyes 3 (three) times daily.  Dispense: 10 mL; Refill: 11    Rubeosis iridis, right    Epiretinal membrane, both eyes    Pseudophakia    History of glaucoma tube shunt procedure    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          F/u TUBE ERROSION THROUGH CONJUNCTIVA     Pt has been followed in the retina and glaucoma clinics at [facility] for many years  Now is transferring to [facility] - main campus (2016)   S/P PRP ou for PDR ou   S/P multiple avastin injections ou  + NVG os // ? POAG od   S/P ahmed os 2011  S/P phaco/IOL / baerveldt os 2012       1. NVG OS 2/2 PDR // ?? POAG od - on gtts    First HVF   ? 10/2014    First photos   6/2016   Treatment / Drops started   Years ago           Family history    ?        Glaucoma meds    cosopt ou / alphagan ou         H/O adverse rxn to glaucoma drops    ? Try and avoid PG's - PDR with ME and NVG os         LASERS    SLT - ? Os or OU //  G6 laser os // PRP ou         GLAUCOMA SURGERIES    ahmed os- 2011 - henriliz - ? [facility]  // baerveldt os - 2012 - ? [place]  - both at [facility] /   / G6 laser os 2/15/2017        OTHER EYE SURGERIES    Phaco/IOL od 9/26/2018         CDR    0.4/0.9        Tbase    ??  (on gtts at [facility] from 2014 to 2016  ran - 12-19 od // 16-25 os )         Tmax    ?             Ttarget    ?             HVF    4 test 2014 to  2015 - [facility] -     [facility] 2 test 2016 to 2019  -24-2 ss  SAD/IAD od // 24-2 stim V gen dep - SAD / IAD os         Gonio    +3 od // +3 sup os with PAS T/N/I         CCT    449/540        OCT    2 test 2016 to 2018 - RNFL - dec S/I od  od // dec S/I, bord N  os        HRT    2 test 2017 to 2018 - MR - nl  od // dec TI/ bord NI  os        Disc photos    2016     - Ttoday   11/17  - Test done today    F/U revision of GDD tube errosion OS of the infero-nasal tube  - 10/3/2019      - OFF -  Xalatan OD due to macular edema  - pt reports good adherence  Cont  cosopt bid ou, brimonidine bid ou        2.  PDR s/p PRP OU (DM2), with CSME OU (and HTN)  - Encouraged good BS/BP/Cholesterol control    3. DME OU  OD>OS  S/p Avastin OD x 9, OS x 7  Will monitor OS for now given NVG and ERM      4. PSK OS  - Stable, CTM, RD precautions       5. ERM OS  Given NVG hx, monitor  - CTM    6. PC IOL OU    OD 9/26/2018 - pcb00 17.0   Os       Cont cosopt ou tid   Cont alphagan tid ou   Stay off  latanoprost - - 2/2 DME od     H/O  RUBEOSIS AT THE PUPIL OD AND 1 SMALL AREA OF ANGLE VIRI ING OD (post CE)    S/P more avastin od 10/9/2018 - Mazzulla - rubeosis regressed    S/p fill in PRP od - 11/26/2018 - Mazzulla - rubeosis regressed    S/p MORE AVASTIN OD 1/25/2019     [facility] good - BCVA - 20/40 - 11/30/2018    If IOP goes up OS  can re-start PG's os and / or repeat cyclo G6    5/24/2019 - pt has been very ill   + CHF   + PVD - had toes amputated left foot   -S/P angioplasty to open leg vessels   Out of hospital and at home - now - wound care  continues     GDD - tube errosion of the IN tube OS - // revision // tube no longer functions - 10/2/2019     POM  #2   anterior chamber depth  Deep   Bleb appearance  None  Patch graft in place IN quadrant   sidell neg  IOP 12/18      cosopt tid ou   Brimonidine tid ou     F/U  3-4 months with HRT      // if IOP is too high  can try PG or rhopressa / or more G6 laser      I have seen and personally examined the patient.  I agree with the findings, assessment and plan of the resident and/or fellow.     Perla Cortés MD     lacerations

## 2023-06-04 NOTE — LETTER
December 10, 2019      Rubén Davis MD  [address] [place] [address]           [facility]  [address]  [place] [address]  Phone: [phone]  Fax: [phone]          Patient: Marvin Ray   MR Number: [id number]   YOB: 1960   Date of Visit: 12/10/2019       Dear Dr. Rubén ANDRADE Page:    Thank you for referring Marvin Ray to me for evaluation. Attached you will find relevant portions of my assessment and plan of care.    If you have questions, please do not hesitate to call me. I look forward to following Marvin Ray along with you.    Sincerely,    Sheryl Madison NP    Enclosure  CC:  No Recipients    If you would like to receive this communication electronically, please contact [email or web address] or [phone] to request more information on [employer] access.    For providers and/or their staff who would like to refer a patient to [facility], please contact us through our one-stop-shop provider referral line, [facility], at [phone].    If you feel you have received this communication in error or would no longer like to receive these types of communications, please e-mail [email or web address]          Patient seen by MD Zapata, report given to RN on Adult side. Patient left ED in stable condition.

## 2023-07-21 NOTE — DISCHARGE SUMMARY
Radiology Discharge Summary      Hospital Course: No complications    Admit Date: 9/12/2019  Discharge Date: 09/12/2019     Instructions Given to Patient: Yes  Diet: Resume prior diet  Activity: activity as tolerated    Description of Condition on Discharge: Stable  Vital Signs (Most Recent):      Discharge Disposition: Home    Discharge Diagnosis:   CKD III and combined systolic/diastolic CHF complicated by recurrent abdominal distension and pain 2/2 recurrent painful, tense ascites s/p successful U/S-guided therapeutic LVP with local anesthetic only and albumin infusion post per protocol.      Patient tolerated the procedure well. No immediate post-procedural complications noted.      Thank you for considering IR for the care of your patient.      Zach Cha MD  Interventional Radiology       Routing refill request to provider for review/approval because:  LOV 4/18/2022    Care team please reach out to patient to schedule appointment     Anaphylaxis Kits Protocol Failed     Recent (12 mo) or future (30 days) visit atiya the authorizing provider's specialty        BRADFORD Dalton  [facility]

## 2023-11-12 NOTE — PLAN OF CARE
Pt verbalized readiness to go home, no present complaints.verbalized understanding of instructions and plan of care  
Follow Instructions Provided by your Surgical Team

## 2024-01-12 NOTE — PATIENT INSTRUCTIONS
Understanding Peripheral Arterial Disease    Peripheral arteries deliver oxygen-rich blood to the tissues outside the heart. As you age, your arteries become stiffer and thicker. In addition, risk factors, such as smoking and high cholesterol, can damage the artery lining. This allows a buildup of fat and other materials (plaque) to form within the artery walls. The buildup of plaque narrows the space inside the artery and sometimes blocks blood flow. Peripheral arterial disease (PAD) happens when blood flow through the arteries is reduced because of plaque buildup. It often happens in the legs and feet, but can also happen elsewhere in the body. If this buildup happens in the a large artery in the neck (carotid artery), it can be a major contributor to stroke.  A healthy artery  An artery is a muscular tube that carries oxgen rich blood and nutrients from the heart to the rest of the body. It has a smooth lining and flexible walls that allow blood to pass freely. When active, muscles need more oxygen. This increases blood flow. Healthy arteries can adapt to meet this need.  A damaged artery    PAD begins when the lining of an artery is damaged. This is often because of risk factors, such as smoking, older age, or diabetes. Plaque then starts to form within the artery wall. At this stage, blood flows normally, so youre not likely to have symptoms.  A narrowed artery    If plaque continues to build up, the space inside the artery narrows. The artery walls become less able to expand. The artery still provides enough blood and oxygen to your muscles during rest. But when youre active, the increased demand for blood cant be met. As a result, your leg may cramp or ache when you walk.  A blocked artery    An artery can become blocked by plaque or by a blood clot lodged in a narrowed section. When this happens, oxygen cant reach the muscle below the blockage. Then you may feel pain when lying down or when you are not  active (rest pain). This type of pain is especially common at night when youre lying flat. In time, the affected tissue can die. This can lead to the loss of a toe or foot.  Date Last Reviewed: 5/1/2016 © 2000-2017 [employer]. [address] [place] [id number]. All rights reserved. This information is not intended as a substitute for professional medical care. Always follow your healthcare professional's instructions.        Peripheral Artery Disease (PAD)     Peripheral artery disease (PAD) occurs when the arteries that carry blood to the limbs are narrowed or blocked. This is usually due to a buildup of a fatty substance called plaque in the walls of the arteries.  PAD most often affects the arteries in the legs. When these arteries are narrowed or blocked, blood flow to the legs is reduced. This can cause leg and foot pain and other symptoms. If severe enough, reduced blood flow to the legs can lead to tissue death (gangrene) and the loss of a toe, foot, or leg. Having PAD also makes it more likely that arteries in other body areas are blocked. For instance, arteries that carry blood to the heart or brain may be affected. This raises the chances of heart attack and stroke.  Risk factors  Certain factors can make PAD more likely. They include:  · Smoking  · Diabetes  · High blood pressure  · Unhealthy cholesterol levels  · Obesity  · Inactive lifestyle  · Older age  · Family history of PAD  Symptoms  Many people with PAD have no symptoms. If symptoms do occur, they can include:  · Pain in the muscles of the calves, thighs or hips that gets worse with activity and better with rest (intermittent claudication)  · Achy, tired, or heavy feeling in the legs  · Weakness, numbness, tingling, or loss of feeling in the legs  · Changes in skin color of the legs  · Sores on the legs and feet  · Cold leg, feet, or toes  · Pain the feet or toes even when lying down (rest pain)  Home care  PAD is a  chronic (lifelong) condition. Treatment is focused on managing your condition and lowering your health risks. This may include doing the following:  · If you smoke, quit. This helps prevent further damage to your arteries and lowers your health risks. Ask your provider about medicines or products that can help you quit smoking. Also consider joining a stop-smoking program or support group.  · Be more active. This helps you lose weight and manage problems such as high blood pressure and unhealthy cholesterol levels. Start a walking program if advised to by your provider. Your provider may also help you form a safe exercise program that is right for your needs.  · Make healthy eating changes. This includes eating less fat, salt, and sugar.  · Take medicines for high blood pressure, unhealthy cholesterol levels, and diabetes as directed.  · Have your blood pressure and cholesterol levels checked as often as directed.  · If you have diabetes, try to keep your blood sugar well controlled. Test your blood sugar as directed.  · If you are overweight, talk to your provider about forming a weight-loss plan.  · Watch for cuts, scrapes, or open sores on your feet. Poor blood flow to the feet may slow healing and increase the risk of infection from these problems.   Follow-up care  Follow up with your healthcare provider, or as advised. If imaging tests such as ultrasound were done, they will be reviewed by a doctor. You will be told the results and any new findings that may affect your care.  When to seek medical advice   Call your healthcare provider right away if any of these occur:  · Sudden severe pain in the legs or feet  · Sudden cold, pale or blue color in the legs or feet  · Weakness or numbness in the legs or feet that worsens  · Any sore or wound in the legs or feet that wont heal  · Weak pulse in your legs or feet  Know the Signs of Heart Attack and Stroke  People with PAD are at high risk for heart attack and  stroke. Knowing the signs of these problems can help you protect your health and get help when you need it. Call 911 right away if you have any of the following:  Signs of a Heart Attack  · Chest discomfort, such as pain, aching, tightness, or pressure that lasts more than a few minutes, or that comes and goes  · Pain or discomfort in the arms, back, shoulders, neck, or jaw  · Shortness of breath  · Sweating (often a cold, clammy sweat)  · Nausea  · Lightheadedness  Signs of a Stroke  · Sudden numbness or weakness of the face, arms, or legs, especially on one side  · Sudden confusion or trouble speaking or understanding  · Sudden trouble seeing in one or both eyes  · Sudden trouble walking, dizziness, or loss of balance  · Sudden, severe headache with no known cause   Date Last Reviewed: 9/21/2015  © 7952-7375 [employer]. [address] [place] [address]. All rights reserved. This information is not intended as a substitute for professional medical care. Always follow your healthcare professional's instructions.        A Walking Program for Peripheral Arterial Disease (PAD)  Peripheral arterial disease (PAD) is a condition where the arteries in the legs are severely damaged. When you have PAD, walking can be painful. So you may start to walk less. Walking less makes your leg muscles weaker. It then becomes harder and more painful to walk. A walking program can break this cycle. This sheet gives you tips on how to get started.     Walking farther without pain  Exercise strengthens leg muscles that are out of shape. It also trains these muscles to work with less oxygen. This helps your leg muscles work better even with reduced blood flow to your legs. When you have PAD, a walking program can be helpful. Your program can:  · Let you walk longer and farther without claudication. This is an ache in your legs during exercise that goes away with rest.  · Let you do more and be more active  · Add to  your overall health and well-being  · Help you control your blood sugar and blood pressure  · Help you become healthier with no risk and at little or no cost  Getting started  Your local hospital, vascular center, or cardiac rehab center may have a special walking program for people with PAD. If so, this is your best option. But if you cant find a program, or its not covered by insurance, you can still walk on your own. Follow these steps at each session:  · Step 1. Start at a pace that lets you walk for 5 to 10 minutes before you start to feel claudication. This feeling is unpleasant, but it doesnt hurt you. Keep going until the pain makes you feel that you need to stop.  · Step 2. Stop and rest for 3 to 5 minutes, just long enough for the pain to go away. You can rest standing or sitting. (Some people like to bring along a cane or a lightweight folding chair.)  · Step 3. Again walk at a pace that lets you walk for 5 to 10 minutes more before you feel pain. This may be slower than your starting pace in step 1. Then rest again.  · Step 4. Repeat this process until youve walked for 45 minutes. This should be about 60 to 80 minutes total, including resting time. You may not be able to do a full 45 minutes at first. Do as much as you can, and increase your time as you improve.  Making the most of your program  · Walk at least 3 times a week. Take no more than 2 days off between sessions. If you stop walking, even for a week or two, you can lose the health benefits of your program.  · Find a good place to walk. A treadmill or a track may be better at first. That way, you wont run the risk of going too far and finding that you cant walk back. Be sure to have a place to walk indoors in bad weather, such as a gym or a mall.  · Wear shoes with sturdy, flexible soles. The heel should fit without slipping. You should have room to wiggle your toes.  · Keep track of how long you walk. A pedometer will show your daily  progress. It can also show how much farther you can walk as time goes by.  · Ask a friend to keep you company. You may enjoy walking with someone else. Or you may want to make your walking sessions a time to relax by yourself.  · Make it fun. Listen to music while you walk and rest. Walk in a favorite park. Get to know the people at the gym, or the folks that you pass on your route. While you rest, you can window-shop, read, or feed the birds. Do whatever will help you enjoy your exercise sessions.  Date Last Reviewed: 6/1/2016  © 1483-6357 [employer]. [address] [place] [address], [place] [address]. All rights reserved. This information is not intended as a substitute for professional medical care. Always follow your healthcare professional's instructions.           annually at GYN

## 2024-02-13 NOTE — TELEPHONE ENCOUNTER
----- Message from Karely Ruffin MA sent at 1/27/2020  9:19 AM CST -----  Contact: Marvin   tel:     [phone]        ----- Message -----  From: Kamini Garza  Sent: 1/27/2020   9:16 AM CST  To: [facility]cedo Staff    Pt. Says he is on his last INSULIN pen and has been waiting for you to send in his supply of Insulin.     Pls call to discuss  This .    XULTOPHY is the name of the insulin.    Will take a 3 mos. Supply if the insurance will cover this.    Personal collateral

## 2024-09-26 NOTE — TELEPHONE ENCOUNTER
Labored especially when lying.  Ascites has grown and is causing difficulty in his breathing.    Reason for Disposition   [1] MODERATE difficulty breathing (e.g., speaks in phrases, SOB even at rest, pulse 100-120) AND [2] NEW-onset or WORSE than normal    Protocols used: BREATHING DIFFICULTY-A-AH

## 2024-10-16 NOTE — INTERVAL H&P NOTE
The patient has been examined and the H&P has been reviewed:    I concur with the findings and no changes have occurred since H&P was written.    Anesthesia/Surgery risks, benefits and alternative options discussed and understood by patient/family.          Active Hospital Problems    Diagnosis  POA    Open wound of left foot [S91.302A]  Yes      Resolved Hospital Problems   No resolved problems to display.      There are no Wet Read(s) to document.

## 2024-11-19 NOTE — H&P
"Chief Complaint   Patient presents with    Follow Up     Hot flashes       Initial /72 (BP Location: Right arm, Patient Position: Sitting, Cuff Size: Adult Regular)   Pulse 65   Resp 16   Wt 76.1 kg (167 lb 12.8 oz)   LMP 06/01/1990 (Approximate)   SpO2 96%   BMI 30.45 kg/m   Estimated body mass index is 30.45 kg/m  as calculated from the following:    Height as of 10/21/24: 1.581 m (5' 2.25\").    Weight as of this encounter: 76.1 kg (167 lb 12.8 oz).  Medication Reconciliation: complete    Betsy Hernandez LPN    Advance Care Directive reviewed    " Radiology Consult    Marvin Ray is a 59 y.o. male with a history of recurrent Ascites.  No issues with prior paracentesis.  Clotting factors intact..  Past Medical History:   Diagnosis Date    Arthritis     Cellulitis     CKD (chronic kidney disease), stage III     Coronary artery disease     Diabetes mellitus     Diabetic retinopathy     Diabetic ulcer of left foot     Glaucoma     Gout     Hyperlipemia     Hypertension     ICD (implantable cardioverter-defibrillator) in place 11/02/2018    Left chest    Non-pressure chronic ulcer of other part of left foot with fat layer exposed 10/23/2018    PVD (peripheral vascular disease)     Type 2 diabetes mellitus with left diabetic foot ulcer 10/29/2018    Unsteady gait     uses a wheelchair     Past Surgical History:   Procedure Laterality Date    AHMED GLAUCOMA IMPLANT Left 2011    DONE AT [facility]    AORTOGRAPHY WITH SERIALOGRAPHY Left 4/30/2019    Procedure: AORTOGRAM, WITH SERIALOGRAPHY, LEFT LOWER EXTREMITY, POSSIBLE INTERVENTION;  Surgeon: Mitch Chan MD;  Location: [facility] OR;  Service: Vascular;  Laterality: Left;  RN PRE OP 4-23-19.  NO H & P, No Cosent  [place]    BAERVELDT GLAUCOMA IMPLANT Left 2012    WITH CATARACT EXTRACTION//DONE AT [facility]    CARDIAC CATHETERIZATION Left 05/2016    CARDIAC DEFIBRILLATOR PLACEMENT Left 11/02/2018    CATARACT EXTRACTION W/  INTRAOCULAR LENS IMPLANT Left 2012    WITH BAERVEDT//DONE AT [facility]    CATARACT EXTRACTION W/  INTRAOCULAR LENS IMPLANT Right 09/26/2018    COMPLEX ()    CB DESTRUCTION WITH CYCLO G6 Left 02/15/2017        CYST REMOVAL      DEBRIDEMENT OF FOOT  12/28/2018    Procedure: DEBRIDEMENT, FOOT;  Surgeon: Mitch Chan MD;  Location: [facility] OR;  Service: Vascular;;    DEBRIDEMENT OF FOOT  6/5/2019    Procedure: DEBRIDEMENT, FOOT;  Surgeon: Mitch Chan MD;  Location: [facility] OR;  Service: Vascular;;    EXAMINATION UNDER ANESTHESIA Left  12/5/2018    Procedure: Exam under anesthesia, left foot debridement, washout and all other indicated procedures;  Surgeon: Mitch Wall MD;  Location: [facility] OR;  Service: Vascular;  Laterality: Left;  1030AM START  RN PREOP 12/3/2018-----AICD  SEEN BY DR AJMES 12/4    EXAMINATION UNDER ANESTHESIA Left 2/13/2019    Procedure: LEFT FOOT WOUND DEBRIDEMENT, WASHOUT AND ALL OTHER INDICATED PROCEDURES;  Surgeon: Mitch Wall MD;  Location: [facility] OR;  Service: Vascular;  Laterality: Left;  requesting 1st case start  THIS CASE 1ST PER DR WALL ON 2/1/19 @ 844AM -[place]  RN PREOP 2/6/2019  HAS CARDIAC CLEARANCE    EXAMINATION UNDER ANESTHESIA Left 12/28/2018    Procedure: Exam under anesthesia, left foot debridement, left foot washout, possible left second through fifth metatarsal resection;  Surgeon: Mitch Wall MD;  Location: [facility] OR;  Service: Vascular;  Laterality: Left;  1:00pm start per julissa @ 8:58am  RN  PHONE PRE OP 12-27-18--=Pt coming from [facility]  NEED CONSENT    EXAMINATION UNDER ANESTHESIA Left 6/5/2019    Procedure: LEFT FOOT DEBRIDEMENT, WASHOUT AND ALL OTHER INDICATED PROCEDURES;  Surgeon: Mitch Wall MD;  Location: [facility] OR;  Service: Vascular;  Laterality: Left;  RN PRE OP 5-31-19------ICD------NEED CONSENT    INCISION AND DRAINAGE Left 11/14/2018    Procedure: Incision and Drainage, left lower extremity debridement, washout;  Surgeon: Mitch Wall MD;  Location: [facility] OR;  Service: Vascular;  Laterality: Left;    INCISION AND DRAINAGE Left 11/16/2018    Procedure: INCISION AND DRAINAGE;  Surgeon: Mitch Wall MD;  Location: [facility] OR;  Service: Vascular;  Laterality: Left;    INTRAOCULAR PROSTHESES INSERTION Right 9/26/2018    Procedure: INSERTION, IOL PROSTHESIS;  Surgeon: Perla Cortés MD;  Location: [facility] OR [address] [place] [address];  Service: Ophthalmology;  Laterality: Right;    PERITONEOCENTESIS N/A 3/1/2019    Procedure: PARACENTESIS, ABDOMINAL,  INITIAL;  Surgeon: Dosc Diagnostic Provider;  Location: [facility] OR;  Service: Radiology;  Laterality: N/A;    PERITONEOCENTESIS N/A 3/25/2019    Procedure: PARACENTESIS, ABDOMINAL, INITIAL;  Surgeon: Dosc Diagnostic Provider;  Location: [facility] OR;  Service: Radiology;  Laterality: N/A;    PERITONEOCENTESIS N/A 7/22/2019    Procedure: PARACENTESIS, ABDOMINAL, INITIAL;  Surgeon: Dosc Diagnostic Provider;  Location: [facility] OR;  Service: Radiology;  Laterality: N/A;    PERITONEOCENTESIS N/A 8/16/2019    Procedure: PARACENTESIS, ABDOMINAL, INITIAL;  Surgeon: Dosc Diagnostic Provider;  Location: [facility] OR;  Service: Radiology;  Laterality: N/A;    PERITONEOCENTESIS N/A 9/26/2019    Procedure: PARACENTESIS, ABDOMINAL;  Surgeon: Dosc Diagnostic Provider;  Location: [facility] OR;  Service: Radiology;  Laterality: N/A;    PERITONEOCENTESIS N/A 10/11/2019    Procedure: PARACENTESIS, ABDOMINAL;  Surgeon: Dosc Diagnostic Provider;  Location: [facility] OR;  Service: Radiology;  Laterality: N/A;    PERITONEOCENTESIS N/A 10/31/2019    Procedure: PARACENTESIS, ABDOMINAL;  Surgeon: Dosc Diagnostic Provider;  Location: [facility] OR;  Service: Radiology;  Laterality: N/A;    PERITONEOCENTESIS N/A 11/18/2019    Procedure: PARACENTESIS, ABDOMINAL;  Surgeon: Dosc Diagnostic Provider;  Location: [facility] OR;  Service: Radiology;  Laterality: N/A;    PERITONEOCENTESIS N/A 12/16/2019    Procedure: PARACENTESIS, ABDOMINAL;  Surgeon: Dosc Diagnostic Provider;  Location: [facility] OR;  Service: Radiology;  Laterality: N/A;  2PM START    PHACOEMULSIFICATION OF CATARACT Right 9/26/2018    Procedure: PHACOEMULSIFICATION, CATARACT;  Surgeon: Perla Cortés MD;  Location: [facility] OR [address] [place] [address];  Service: Ophthalmology;  Laterality: Right;    REPEAT ABDOMINAL PARACENTESIS N/A 2/11/2019    Procedure: PARACENTESIS, ABDOMINAL, REPEAT;  Surgeon: Dosc Diagnostic Provider;  Location: [facility];  Service: Radiology;  Laterality: N/A;  1PM START    REPEAT ABDOMINAL PARACENTESIS N/A  9/12/2019    Procedure: PARACENTESIS, ABDOMINAL, REPEAT;  Surgeon: Dosc Diagnostic Provider;  Location: [facility] OR;  Service: Radiology;  Laterality: N/A;  9AM START    REPEAT ABDOMINAL PARACENTESIS N/A 12/2/2019    Procedure: PARACENTESIS, ABDOMINAL, REPEAT;  Surgeon: Dosc Diagnostic Provider;  Location: [facility] OR;  Service: Radiology;  Laterality: N/A;  3PM START    REPEAT ABDOMINAL PARACENTESIS N/A 1/10/2020    Procedure: PARACENTESIS, ABDOMINAL, REPEAT;  Surgeon: Dos Diagnostic Provider;  Location: [facility] OR;  Service: Radiology;  Laterality: N/A;  1130AM START    REPEAT ABDOMINAL PARACENTESIS N/A 1/20/2020    Procedure: PARACENTESIS, ABDOMINAL, REPEAT;  Surgeon: Dos Diagnostic Provider;  Location: [facility] OR;  Service: Radiology;  Laterality: N/A;  1PM START    REPEAT ABDOMINAL PARACENTESIS N/A 1/31/2020    Procedure: PARACENTESIS, ABDOMINAL, REPEAT;  Surgeon: Dos Diagnostic Provider;  Location: [facility] OR;  Service: Radiology;  Laterality: N/A;  10AM START    REVISION OF PROCEDURE INVOLVING GLAUCOMA DRAINAGE DEVICE Left 10/2/2019    EXTRUDING BAERVELDT /WITH PERICARDIAL PATCH GRAFT ()    Right foot surgery  10/2014    TOE AMPUTATION Right     first and second    TOE AMPUTATION Left 11/16/2018    Procedure: AMPUTATION, TOES  2-5;  Surgeon: Mitch Chan MD;  Location: [facility] OR;  Service: Vascular;  Laterality: Left;    TOE AMPUTATION Left 12/5/2018    Procedure: AMPUTATION, TOE;  Surgeon: Mitch Chan MD;  Location: [facility] OR;  Service: Vascular;  Laterality: Left;  left great toe    TONSILLECTOMY           Imaging reviewed with Radiology staff, Dr. Mackenzie.     Procedure: Paracentesis    Scheduled Meds:   Continuous Infusions:   PRN Meds:    Allergies:   Review of patient's allergies indicates:   Allergen Reactions    Statins-hmg-coa reductase inhibitors      Generalized Pain    Onglyza [saxagliptin]     Penicillins Rash       Labs:  No results for input(s): INR in the last 168  hours.    Invalid input(s):  PT,  PTT  No results for input(s): WBC, HGB, HCT, MCV, PLT in the last 168 hours. No results for input(s): GLU, NA, K, CL, CO2, BUN, CREATININE, CALCIUM, MG, ALT, AST, ALBUMIN, BILITOT, BILIDIR in the last 168 hours.      Vitals (Most Recent):       Plan:   1.   Hold anticoagulants.  2.  scheduled for paracentesis.  Local anesthesia    Jenna Mackenzie MD  Staff Radiologist  Department of Radiology  Pager: [phone]

## 2025-01-20 NOTE — ASSESSMENT & PLAN NOTE
-- DO NOT REPLY / DO NOT REPLY ALL --  -- This inbox is not monitored. If this was sent to the wrong provider or department, reroute message to P [facility] Reroute pool. --  -- Message is from Engagement Center Operations ([facility]) --      Message Type:  Refill Medication   Refill request for Pended medication named: benazepril (LOTENSIN) 5 MG tablet   Preferred pharmacy verified, and selected.   [id number] - [place], [place] - [address]    Is the patient OUT of Medication?  Yes and During Work Hours Medication Refills handled by [facility] Clinical - route as high priority to [facility] CLINICAL MSG pool. Patient has been advised it will be addressed within 1 business day.     Message: patient only has dose for today left would like a refill                Copied from CRM #[id number]. Topic: MW Medication/Rx - MW Rx Refill  >> Jan 20, 2025  9:47 AM Diana GIANG wrote:  Marco Mcdonnell called to request a medication refill and is out of medications or critically low during working hours. Medication is:  Listed on Med Management list. [facility] Clinical to process refill: Selected 'Wrap Up CRM' and created new Refill Med Encounter after clicking 'Convert to Clinical Call'. Selected reason for call 'Refill Request'. Pended medication. Sent Med template and routed as high priority to [facility] CLINICAL MSG POOL. Readback full message.   -L foot wound multifactorial likely due to DM, PVD and venous insufficiency - rec angiography with possible intervention, plan for 11/12/18 now that K and Cr has normalized;   -cont optimization with Nephrology consult if persists   -Cont wound care consult recs  -Cont offloading  -Abx per primary team  -Strict glycemic control

## 2025-07-04 NOTE — PLAN OF CARE
Problem: Physical Therapy Goal  Goal: Physical Therapy Goal  Goals to be met by: 10/27/18     Patient will increase functional independence with mobility by performin. Supine to sit with Modified Randall  2. Sit to stand transfer with Modified Randall  3. Gait  x 150 feet with Contact Guard Assistance using the least restrictive device from platform walker -> cane.   4. Lower extremity exercise program x 20-30 reps per handout, with supervision    Outcome: Ongoing (interventions implemented as appropriate)  PT for functional mob training and ex       Medications

## (undated) DEVICE — STAPLER SKIN PROXIMATE WIDE

## (undated) DEVICE — APPLICATOR CHLORAPREP ORN 26ML

## (undated) DEVICE — SOL NACL IRR 3000ML

## (undated) DEVICE — SYR 10CC LUER LOCK

## (undated) DEVICE — SPONGE LAP 18X18 PREWASHED

## (undated) DEVICE — SOL NORMAL USPCA 0.9%

## (undated) DEVICE — SOL WATER STRL IRR 1000ML

## (undated) DEVICE — Device

## (undated) DEVICE — SEE MEDLINE ITEM 107746

## (undated) DEVICE — SUT 4/0 18IN COATED VICRYL

## (undated) DEVICE — PACK ARTHROSCOPY W/ISO BAC

## (undated) DEVICE — UNDERGLOVE BIOGEL PI SZ 6.5 LF

## (undated) DEVICE — PAD PREP 50/CA

## (undated) DEVICE — SOL IRR NACL .9% 3000ML

## (undated) DEVICE — SEE MEDLINE ITEM 146417

## (undated) DEVICE — PULSAVAC ZIMMER

## (undated) DEVICE — CABLE PACING ALLGTR CLIP 12

## (undated) DEVICE — BLANKET UPPER BODY 78.7X29.9IN

## (undated) DEVICE — SOL 9P NACL IRR PIC IL

## (undated) DEVICE — DRESSING N ADH OIL EMUL 3X3

## (undated) DEVICE — SUT 6/0 18IN COATED VICRYL

## (undated) DEVICE — ELECTRODE REM PLYHSV RETURN 9

## (undated) DEVICE — BLADE #15 STERILE CARBON

## (undated) DEVICE — SEE MEDLINE ITEM 152515

## (undated) DEVICE — SPONGE WEC CEL SPEARS

## (undated) DEVICE — SUT VICRYL 2-0 36 CT-1

## (undated) DEVICE — DRAPE STERI-DRAPE APERTURE

## (undated) DEVICE — SUT CTD VICRYL BR CR/SH VIL

## (undated) DEVICE — GLOVE SURGICAL LATEX SZ 8

## (undated) DEVICE — SLING SWATHE UNIVERSAL FOAM

## (undated) DEVICE — GLOVE SURGICAL LATEX SZ 6.5

## (undated) DEVICE — PAD CAST SPECIALIST STRL 6

## (undated) DEVICE — DRESSING TELFA N ADH 3X8

## (undated) DEVICE — SOL BETADINE 5%

## (undated) DEVICE — SEE MEDLINE ITEM 152530

## (undated) DEVICE — SEE MEDLINE ITEM 152622

## (undated) DEVICE — STOCKINET TUBULAR 1 PLY 6X60IN

## (undated) DEVICE — SPONGE DERMACEA GAUZE 4X4

## (undated) DEVICE — DRESSING TRANS 2X2 TEGADERM

## (undated) DEVICE — SEE MEDLINE ITEM 157131

## (undated) DEVICE — CUFF TOURNIQUET DUAL PRT

## (undated) DEVICE — SEE MEDLINE ITEM 146271

## (undated) DEVICE — COVER OVERHEAD SURG LT BLUE

## (undated) DEVICE — HEMOSTAT SURGICEL 4X8IN

## (undated) DEVICE — CONTAINER SPECIMEN STRL 4OZ

## (undated) DEVICE — CANISTER SUCTION 2 LTR

## (undated) DEVICE — BLADE SAW OSC ME-92 COATED

## (undated) DEVICE — PADDING CAST 4IN SPECIALIST

## (undated) DEVICE — PADDING CAST AST SOFT-ROLL 3X4

## (undated) DEVICE — SET BONESCALPEL TUBE IRR

## (undated) DEVICE — GLOVE BIOGEL PI MICRO SZ 6.5

## (undated) DEVICE — ADHESIVE DERMABOND ADVANCED

## (undated) DEVICE — DRESSING GAUZE XEROFORM 5X9

## (undated) DEVICE — SEE MEDLINE ITEM 154981

## (undated) DEVICE — SUT SILK 0 SH 30IN BLK BR

## (undated) DEVICE — DRESSING EYE OVAL LF

## (undated) DEVICE — PAD ABD 8X10 STERILE

## (undated) DEVICE — REAMER MOD BIXCUT 8X48MM STER.

## (undated) DEVICE — TOWEL OR DISP STRL BLUE 4/PK

## (undated) DEVICE — SYR 3CC 21 X 1 LUER LOCK

## (undated) DEVICE — BLADE SURG CARBON STEEL #10

## (undated) DEVICE — KIT PT CARE HANA PROFX SSXT

## (undated) DEVICE — NDL HYPO REG 25G X 1 1/2

## (undated) DEVICE — GUIDE WIRE 3.0X1000MM BALL TIP
Type: IMPLANTABLE DEVICE | Site: HIP | Status: NON-FUNCTIONAL
Removed: 2022-04-05

## (undated) DEVICE — CLIPPER BLADE MOD 4406 (CAREF)

## (undated) DEVICE — SUT 0 18IN SILK BLK BRAIDE

## (undated) DEVICE — SEE MEDLINE ITEM 157110

## (undated) DEVICE — GLOVE SURG BIOGEL LATEX SZ 7.5

## (undated) DEVICE — DRAPE INCISE IOBAN 2 23X17IN

## (undated) DEVICE — GAUZE SPONGE 4X4 12PLY

## (undated) DEVICE — BLADE SURG STAINLESS STEEL #15

## (undated) DEVICE — DRESSING TRANS 4X4 TEGADERM

## (undated) DEVICE — COLLECTOR SPECIMEN ANAEROBIC

## (undated) DEVICE — CATH ALL PUR URTHL RR 10FR

## (undated) DEVICE — PACK PM MEADOWCREST CUSTOM

## (undated) DEVICE — GOWN SURGICAL X-LARGE

## (undated) DEVICE — TRAY MINOR GEN SURG

## (undated) DEVICE — HAND CONTROL ELECTRODE PENCIL

## (undated) DEVICE — GLOVE BIOGEL ORTHOPEDIC 8

## (undated) DEVICE — SUT VICRYL PLUS 0 CT1 36IN

## (undated) DEVICE — SEE MEDLINE ITEM 152708

## (undated) DEVICE — DRAPE PLASTIC U 60X72

## (undated) DEVICE — SEE MEDLINE ITEM 157117

## (undated) DEVICE — DRAPE STERI-DRAPE 83X125 IOBAN

## (undated) DEVICE — STAPLER SKIN WIDE

## (undated) DEVICE — SEE MEDLINE ITEM 146292

## (undated) DEVICE — CORD BIPOLAR 12 FOOT

## (undated) DEVICE — GLOVE BIOGEL ECLIPSE SZ 6.5

## (undated) DEVICE — WIRE KIRSCHNER T2 3X285MM SS
Type: IMPLANTABLE DEVICE | Site: HIP | Status: NON-FUNCTIONAL
Removed: 2022-04-05

## (undated) DEVICE — GAUZE SPONGE 4X4 12 PLY NS

## (undated) DEVICE — PAD CAST SPECIALIST STRL 4

## (undated) DEVICE — GLOVE BIOGEL 7.5

## (undated) DEVICE — CANNULA OPHTHALMIC J SHAPE 25G

## (undated) DEVICE — SOL IRR BSS OPHTH 500ML STRL

## (undated) DEVICE — SPONGE GAUZE 16PLY 4X4

## (undated) DEVICE — K-WIRE GAMMA 3.2MM DIAX450MM L
Type: IMPLANTABLE DEVICE | Site: HIP | Status: NON-FUNCTIONAL
Removed: 2022-04-05

## (undated) DEVICE — BANDAGE COBAN COLOR ASSORT 3X5

## (undated) DEVICE — PAD TRACTION BOOT

## (undated) DEVICE — INTRODUCER OPTISEAL 7F 13CM

## (undated) DEVICE — SHIELD COLLAGEN 12HR CORNEAL

## (undated) DEVICE — SUT 2-0 VICRYL / SH (J417)

## (undated) DEVICE — COVER LIGHT HANDLE 80/CA

## (undated) DEVICE — NDL HYPO A BEVEL 22X1 1/2

## (undated) DEVICE — SUPPORT ULNA NERVE PROTECTOR

## (undated) DEVICE — SUT 10/0 1X12IN BLK TG160-6

## (undated) DEVICE — BIT DRILL T2 4.2 X 180MM L

## (undated) DEVICE — STAPLER SKIN ROTATING HEAD

## (undated) DEVICE — TIP PHACO 45 DEGREE KELMAN

## (undated) DEVICE — NDL RETROBULAR AKTKINSON 23G

## (undated) DEVICE — DRESSING ADAPTIC N ADH 3X8IN

## (undated) DEVICE — TUBING VENTED 90IN

## (undated) DEVICE — BANDAGE ACE ELASTIC 6"

## (undated) DEVICE — SUT VICRYL 9-0 VIL MONO

## (undated) DEVICE — SUT 10/0 12IN VICRYL VIO M

## (undated) DEVICE — SUT 4-0 ETHILON 18 PS-2

## (undated) DEVICE — SHIELD EYE PLASTIC 3100G

## (undated) DEVICE — GLOVE BIOGEL PIMICRO INDIC 7.5

## (undated) DEVICE — ELECTRODE QUIK COMBO 2/PK

## (undated) DEVICE — SEE MEDLINE ITEM 146313

## (undated) DEVICE — ERASER HEMSTAT BPLR 23G BLUNT

## (undated) DEVICE — STRIP PACKING ANTIMIC 1/4X1

## (undated) DEVICE — NDL FILTER 19GA X 1-1/2

## (undated) DEVICE — NDL FLTR 5MCRN BLNT TIP 18GX1

## (undated) DEVICE — SEE MEDLINE ITEM 146345

## (undated) DEVICE — GOWN XX-LARGE

## (undated) DEVICE — BANDAGE KERLIX AMD

## (undated) DEVICE — SWAB CULTURETTE II DUAL

## (undated) DEVICE — KIT MEROCEL INSTRUMENT WIPE

## (undated) DEVICE — SEE MEDLINE ITEM 146308

## (undated) DEVICE — TOURNIQUET SB QC DP 34X4IN

## (undated) DEVICE — KIT GREY EYE

## (undated) DEVICE — BLADE EYE

## (undated) DEVICE — SEE MEDLINE ITEM 146298

## (undated) DEVICE — SHEILD PLASTIC EYE